# Patient Record
Sex: MALE | Race: WHITE | NOT HISPANIC OR LATINO | Employment: OTHER | URBAN - NONMETROPOLITAN AREA
[De-identification: names, ages, dates, MRNs, and addresses within clinical notes are randomized per-mention and may not be internally consistent; named-entity substitution may affect disease eponyms.]

---

## 2017-01-09 RX ORDER — AMLODIPINE BESYLATE 5 MG/1
TABLET ORAL
Qty: 90 TABLET | Refills: 0 | Status: SHIPPED | OUTPATIENT
Start: 2017-01-09 | End: 2017-04-06

## 2017-01-19 ENCOUNTER — OFFICE VISIT (OUTPATIENT)
Dept: INTERNAL MEDICINE | Facility: CLINIC | Age: 67
End: 2017-01-19

## 2017-01-19 VITALS
HEIGHT: 68 IN | SYSTOLIC BLOOD PRESSURE: 132 MMHG | BODY MASS INDEX: 20.76 KG/M2 | RESPIRATION RATE: 14 BRPM | HEART RATE: 68 BPM | WEIGHT: 137 LBS | TEMPERATURE: 98.4 F | DIASTOLIC BLOOD PRESSURE: 80 MMHG | OXYGEN SATURATION: 94 %

## 2017-01-19 DIAGNOSIS — E11.8 TYPE 2 DIABETES MELLITUS WITH COMPLICATION, WITHOUT LONG-TERM CURRENT USE OF INSULIN (HCC): ICD-10-CM

## 2017-01-19 DIAGNOSIS — I10 ESSENTIAL HYPERTENSION: ICD-10-CM

## 2017-01-19 DIAGNOSIS — I25.10 ATHEROSCLEROSIS OF NATIVE CORONARY ARTERY OF NATIVE HEART WITHOUT ANGINA PECTORIS: Primary | ICD-10-CM

## 2017-01-19 DIAGNOSIS — J44.9 STEROID-DEPENDENT COPD (HCC): ICD-10-CM

## 2017-01-19 DIAGNOSIS — Z23 NEED FOR VACCINATION: ICD-10-CM

## 2017-01-19 DIAGNOSIS — Z00.00 HEALTH CARE MAINTENANCE: ICD-10-CM

## 2017-01-19 DIAGNOSIS — N18.30 CHRONIC KIDNEY DISEASE, STAGE III (MODERATE) (HCC): ICD-10-CM

## 2017-01-19 DIAGNOSIS — G47.30 SLEEP APNEA, UNSPECIFIED TYPE: ICD-10-CM

## 2017-01-19 DIAGNOSIS — J42 CHRONIC BRONCHITIS, UNSPECIFIED CHRONIC BRONCHITIS TYPE (HCC): ICD-10-CM

## 2017-01-19 DIAGNOSIS — F41.9 ANXIETY: ICD-10-CM

## 2017-01-19 DIAGNOSIS — E78.5 HYPERLIPIDEMIA, UNSPECIFIED HYPERLIPIDEMIA TYPE: ICD-10-CM

## 2017-01-19 DIAGNOSIS — I48.91 ATRIAL FIBRILLATION, UNSPECIFIED TYPE (HCC): ICD-10-CM

## 2017-01-19 DIAGNOSIS — K21.9 GERD WITHOUT ESOPHAGITIS: ICD-10-CM

## 2017-01-19 DIAGNOSIS — N40.0 ENLARGED PROSTATE WITHOUT LOWER URINARY TRACT SYMPTOMS (LUTS): ICD-10-CM

## 2017-01-19 PROCEDURE — 99213 OFFICE O/P EST LOW 20 MIN: CPT | Performed by: INTERNAL MEDICINE

## 2017-01-19 PROCEDURE — 99397 PER PM REEVAL EST PAT 65+ YR: CPT | Performed by: INTERNAL MEDICINE

## 2017-01-19 PROCEDURE — G0009 ADMIN PNEUMOCOCCAL VACCINE: HCPCS | Performed by: INTERNAL MEDICINE

## 2017-01-19 PROCEDURE — 90670 PCV13 VACCINE IM: CPT | Performed by: INTERNAL MEDICINE

## 2017-01-19 RX ORDER — ESCITALOPRAM OXALATE 20 MG/1
TABLET ORAL
Qty: 30 TABLET | Refills: 3 | Status: SHIPPED | OUTPATIENT
Start: 2017-01-19 | End: 2017-02-20

## 2017-01-19 RX ORDER — PREDNISONE 10 MG/1
10 TABLET ORAL DAILY
Status: ON HOLD | COMMUNITY
Start: 2016-12-25 | End: 2017-09-18

## 2017-01-19 RX ORDER — BENZONATATE 200 MG/1
200 CAPSULE ORAL 3 TIMES DAILY PRN
Qty: 30 CAPSULE | Refills: 3 | Status: SHIPPED | OUTPATIENT
Start: 2017-01-19 | End: 2017-02-20

## 2017-01-19 RX ORDER — AZITHROMYCIN 500 MG/1
500 TABLET, FILM COATED ORAL DAILY
Qty: 5 TABLET | Refills: 0 | Status: SHIPPED | OUTPATIENT
Start: 2017-01-19 | End: 2017-02-20

## 2017-01-19 RX ORDER — FLUTICASONE PROPIONATE 50 MCG
1 SPRAY, SUSPENSION (ML) NASAL DAILY
COMMUNITY
Start: 2017-01-04 | End: 2017-12-22 | Stop reason: SDUPTHER

## 2017-01-19 RX ORDER — ATORVASTATIN CALCIUM 40 MG/1
TABLET, FILM COATED ORAL
COMMUNITY
Start: 2017-01-01 | End: 2017-02-22 | Stop reason: SDUPTHER

## 2017-01-19 NOTE — PROGRESS NOTES
Subjective   Thurman Mendel Parsons is a 66 y.o. male and is here for a comprehensive physical exam. Patient here for annual physical. Patient also has multiple medical problems to be followed up. Below is a level III office visit note. Depression still on medicine. No SI    COPD stable on medication needs a refill. Diabetes is stable on medication. Hypertension stable on medication. Chronic kidney disease is stable. Patient does complain urinary incontinence oxybutynin helps but still leaking urine a lot , cough again sputum,yellow, no f/c, no resp distress    Do you take any herbs or supplements that were not prescribed by a doctor? no  Are you taking calcium supplements? no  Are you taking aspirin daily? no      The following portions of the patient's history were reviewed and updated as appropriate: allergies, current medications, past family history, past medical history, past social history, past surgical history and problem list.      Review of Systems   Constitutional: Negative.    HENT: Negative.    Eyes: Negative.    Respiratory: Positive for cough, shortness of breath and wheezing.    Cardiovascular: Negative.    Gastrointestinal: Negative.    Endocrine: Negative.    Genitourinary: Negative.    Musculoskeletal: Negative.    Skin: Negative.    Allergic/Immunologic: Negative.    Neurological: Negative.    Hematological: Negative.    Psychiatric/Behavioral: The patient is nervous/anxious.         Depress   All other systems reviewed and are negative.        Physical Exam   Constitutional: He is oriented to person, place, and time. He appears well-developed and well-nourished.   HENT:   Head: Normocephalic and atraumatic.   Right Ear: External ear normal.   Left Ear: External ear normal.   Nose: Nose normal.   Mouth/Throat: Oropharynx is clear and moist.   Eyes: Conjunctivae and EOM are normal. Pupils are equal, round, and reactive to light.   Neck: Normal range of motion. Neck supple. No thyromegaly present.    Cardiovascular: Normal rate, regular rhythm, normal heart sounds and intact distal pulses.    Pulmonary/Chest: Effort normal. He has wheezes.   Abdominal: Soft. Bowel sounds are normal.   Genitourinary: Rectum normal, prostate normal and penis normal.   Musculoskeletal: Normal range of motion.   Neurological: He is alert and oriented to person, place, and time. He has normal reflexes.   Skin: Skin is warm and dry.   Psychiatric: His behavior is normal. Judgment and thought content normal.   Depress anxious   Nursing note and vitals reviewed.      All  tests have been reviewed.    Assessment/Plan          1. Patient Counseling:  --Nutrition: Stressed importance of moderation in sodium/caffeine intake, saturated fat and cholesterol, caloric balance, sufficient intake of fresh fruits, vegetables, fiber, calcium and iron.  --Exercise: Stressed the importance of regular exercise.   --Injury prevention: Discussed safety belts, safety helmets, smoke detector, smoking near bedding or upholstery.   --Dental health: Discussed importance of regular tooth brushing, flossing, and dental visits.  --Immunizations reviewed.  --Discussed benefits of screening colonoscopy.  --After hours service discussed with patient    2. Discussed the patient's BMI with him.            COPD continue inhaler chronic prednisone 5 mg daily considering patient has recurrent bronchitis COPD exacerbation. refill medicine, follow lung doctor, cough and sputum again, start tesslon and zithromax  osteopenia, oscal D 500mg bid  Weight loss stable now  left low back pain follow up with ortho s/p left hip surgery for severe OA doing well.   alkaline phosphatase still high watch  divertic hemorroid on colon 1/2013  DM CONTINUE MEDICINE  phimosis seen by uro declined surgery  Hematuria followup with urologist s/p cystoscope bladder stones  Hypertension continue medicine  BPH enlarged on CT scan follow up with urologist.continue flomax   Anemia workup all  negative.normal now. It is anemia of chronic disease due to chronic kidney disease followup with nephrologist  CKD follow up with kidney doctor  History of the CAD atrial fibrillation VSD status post occlusion stable now. Status post a defibrillator seen by cardio follow up with cardio  History of a TIA patient is on Plavix continue  pneumovax done, ,zostavax to pharmacy,  prevnar 13 today, Td to HD.  Depression increase lexapro to 20  Decline to disrobe for PE   right inquinal hernia patient to call dr jc  1 mo after labs

## 2017-01-19 NOTE — MR AVS SNAPSHOT
Thurman Mendel Parsons   1/19/2017 3:15 PM   Office Visit    Provider:  Miguel Rojas MD   Department:  [facility] PRIMARY CARE   Dept Phone:  [phone]                Your Full Care Plan              Today's Medication Changes          These changes are accurate as of: 1/19/17  4:39 PM.  If you have any questions, ask your nurse or doctor.               Medication(s)that have changed:     atorvastatin 40 MG tablet   Commonly known as:  LIPITOR   What changed:  Another medication with the same name was removed. Continue taking this medication, and follow the directions you see here.   Changed by:  Miguel Rojas MD       benzonatate 200 MG capsule   Commonly known as:  TESSALON   Take 1 capsule by mouth 3 (Three) Times a Day As Needed (cough).   What changed:  reasons to take this   Changed by:  Miguel Rojas MD       escitalopram 20 MG tablet   Commonly known as:  LEXAPRO   1 tab daily po   What changed:  See the new instructions.   Changed by:  Miguel Rojas MD       predniSONE 10 MG tablet   Commonly known as:  DELTASONE   What changed:  Another medication with the same name was removed. Continue taking this medication, and follow the directions you see here.   Changed by:  Miguel Rojas MD         Stop taking medication(s)listed here:     ALBUTEROL IN   Stopped by:  Miguel Rojas MD                Where to Get Your Medications      [facility] [place] [facility] [place] [place] [address] [place] [facility] [phone] [facility] [phone] [address] [address]     Phone:  [phone]     azithromycin 500 MG tablet    benzonatate 200 MG capsule    escitalopram 20 MG tablet                  Your Updated Medication List          This list is accurate as of: 1/19/17  4:39 PM.  Always use your most recent med list.                * albuterol (2.5 MG/3ML) 0.083% nebulizer solution   Commonly known as:  PROVENTIL       *  PROAIR  (90 BASE) MCG/ACT inhaler   Generic drug:  albuterol       alfuzosin 10 MG 24 hr tablet   Commonly known as:  UROXATRAL   TAKE ONE TABLET BY MOUTH EVERY MORNING       ALPRAZolam 0.5 MG tablet   Commonly known as:  XANAX   TAKE ONE TABLET BY MOUTH EVERY NIGHT AT BEDTIME       amLODIPine 5 MG tablet   Commonly known as:  NORVASC   TAKE ONE TABLET BY MOUTH DAILY       amLODIPine-benazepril 5-40 MG per capsule   Commonly known as:  LOTREL       aspirin 81 MG tablet       atorvastatin 40 MG tablet   Commonly known as:  LIPITOR       azithromycin 500 MG tablet   Commonly known as:  ZITHROMAX   Take 1 tablet by mouth Daily.       benzonatate 200 MG capsule   Commonly known as:  TESSALON   Take 1 capsule by mouth 3 (Three) Times a Day As Needed (cough).       BREO ELLIPTA 200-25 MCG/INH aerosol powder    Generic drug:  Fluticasone Furoate-Vilanterol       carvedilol 6.25 MG tablet   Commonly known as:  COREG   TAKE ONE TABLET BY MOUTH TWICE A DAY WITH MEALS       cefuroxime 500 MG tablet   Commonly known as:  CEFTIN       clopidogrel 75 MG tablet   Commonly known as:  PLAVIX   TAKE ONE TABLET BY MOUTH DAILY       cyclobenzaprine 10 MG tablet   Commonly known as:  FLEXERIL       escitalopram 20 MG tablet   Commonly known as:  LEXAPRO   1 tab daily po       famotidine 20 MG tablet   Commonly known as:  PEPCID       fluticasone 50 MCG/ACT nasal spray   Commonly known as:  FLONASE       losartan 100 MG tablet   Commonly known as:  COZAAR   Take 1 tablet by mouth daily.       * nitroglycerin 0.4 MG SL tablet   Commonly known as:  NITROSTAT       * NITROSTAT 0.4 MG SL tablet   Generic drug:  nitroglycerin       oxybutynin 5 MG tablet   Commonly known as:  DITROPAN   TAKE ONE TABLET BY MOUTH AT BEDTIME       POLY-IRON 150 150 MG capsule   Generic drug:  iron polysaccharides       predniSONE 10 MG tablet   Commonly known as:  DELTASONE       tamsulosin 0.4 MG capsule 24 hr capsule   Commonly known as:  FLOMAX   TAKE  ONE CAPSULE BY MOUTH DAILY       tiotropium 18 MCG per inhalation capsule   Commonly known as:  SPIRIVA       * Notice:  This list has 4 medication(s) that are the same as other medications prescribed for you. Read the directions carefully, and ask your doctor or other care provider to review them with you.            You Were Diagnosed With        Codes Comments    Atherosclerosis of native coronary artery of native heart without angina pectoris    -  Primary ICD-10-CM: I25.10  ICD-9-CM: 414.01     Atrial fibrillation, unspecified type     ICD-10-CM: I48.91  ICD-9-CM: 427.31     Hyperlipidemia, unspecified hyperlipidemia type     ICD-10-CM: E78.5  ICD-9-CM: 272.4     Essential hypertension     ICD-10-CM: I10  ICD-9-CM: 401.9     Chronic bronchitis, unspecified chronic bronchitis type     ICD-10-CM: J42  ICD-9-CM: 491.9     Steroid-dependent COPD     ICD-10-CM: J44.9  ICD-9-CM: 496, V58.65     GERD without esophagitis     ICD-10-CM: K21.9  ICD-9-CM: 530.81     Type 2 diabetes mellitus with complication, without long-term current use of insulin     ICD-10-CM: E11.8  ICD-9-CM: 250.90     Chronic kidney disease, stage III (moderate)     ICD-10-CM: N18.3  ICD-9-CM: 585.3     Enlarged prostate without lower urinary tract symptoms (luts)     ICD-10-CM: N40.0  ICD-9-CM: 600.00     Anxiety     ICD-10-CM: F41.9  ICD-9-CM: 300.00     Sleep apnea, unspecified type     ICD-10-CM: G47.30  ICD-9-CM: 780.57     Health care maintenance     ICD-10-CM: Z00.00  ICD-9-CM: V70.0       Medications to be Given to You by a Medical Professional     Due       Frequency    1/19/2017 pneumococcal conj. 13-valent (PREVNAR-13) vaccine 0.5 mL  Once      Instructions     None    Patient Instructions History      MyChart Signup     Our records indicate that you have an active [employer] account.    You can view your After Visit Summary by going to [facility] and logging in with your [employer] username and password.  If you  "don't have a [email or web address] username and password but a parent or guardian has access to your record, the parent or guardian should login with their own [email or web address] username and password and access your record to view the After Visit Summary.    If you have questions, you can email [email or web address] or call [phone] to talk to our [email or web address] staff.  Remember, [email or web address] is NOT to be used for urgent needs.  For medical emergencies, dial 911.               Other Info from Your Visit           Your Appointments     Feb 07, 2017 11:30 AM EST   CT kalee chest wo contrast with KALEE JUNIOR CT 1   [facility] CT AT [place] ([place] ([facility]    [address] [place] [id number] [phone]           n/a            Jun 08, 2017 11:00 AM EDT   Follow Up with Ulysses Bass MD   [facility] MEDICAL [facility] CARDIOLOGY (--)    [address] [address] [phone]           Arrive 15 minutes prior to appointment.              Allergies     Sulfa Antibiotics  Other (See Comments), Nausea Only, Nausea And Vomiting      Reason for Visit     Annual Exam wellness physical      Vital Signs     Blood Pressure Pulse Temperature Respirations Height Weight    132/80 68 98.4 °F (36.9 °C) 14 68\" (172.7 cm) 137 lb (62.1 kg)    Oxygen Saturation Body Mass Index Smoking Status             94% 20.83 kg/m2 Former Smoker         Problems and Diagnoses Noted     Anxiety problem    Atherosclerotic heart disease of native coronary artery without angina pectoris    Atrial fibrillation (irregular heartbeat)    Chronic bronchitis    Chronic kidney disease, stage III (moderate)    Diabetes    Enlarged prostate    GERD without esophagitis    High cholesterol or triglycerides    High blood pressure    Sleep apnea    Steroid-dependent COPD    Health maintenance examination          No Longer an Issue     Anemia    Shortness of breath    Esophagitis, reflux    Respiratory failure      "

## 2017-01-29 DIAGNOSIS — N40.0 BENIGN NON-NODULAR PROSTATIC HYPERPLASIA WITHOUT LOWER URINARY TRACT SYMPTOMS: ICD-10-CM

## 2017-01-30 RX ORDER — ALFUZOSIN HYDROCHLORIDE 10 MG/1
TABLET, EXTENDED RELEASE ORAL
Qty: 30 TABLET | Refills: 4 | Status: SHIPPED | OUTPATIENT
Start: 2017-01-30 | End: 2017-04-13 | Stop reason: SDUPTHER

## 2017-02-01 RX ORDER — FAMOTIDINE 20 MG/1
20 TABLET, FILM COATED ORAL DAILY
Qty: 30 TABLET | Refills: 0 | Status: SHIPPED | OUTPATIENT
Start: 2017-02-01 | End: 2017-03-04 | Stop reason: SDUPTHER

## 2017-02-07 ENCOUNTER — HOSPITAL ENCOUNTER (OUTPATIENT)
Dept: CT IMAGING | Facility: HOSPITAL | Age: 67
Discharge: HOME | End: 2017-02-07
Admitting: INTERNAL MEDICINE

## 2017-02-07 DIAGNOSIS — R91.1 PULMONARY NODULE: ICD-10-CM

## 2017-02-07 PROCEDURE — 71250 CT THORAX DX C-: CPT

## 2017-02-20 ENCOUNTER — OFFICE VISIT (OUTPATIENT)
Dept: INTERNAL MEDICINE | Facility: CLINIC | Age: 67
End: 2017-02-20

## 2017-02-20 VITALS
HEIGHT: 68 IN | TEMPERATURE: 99.2 F | RESPIRATION RATE: 14 BRPM | BODY MASS INDEX: 20.92 KG/M2 | OXYGEN SATURATION: 90 % | SYSTOLIC BLOOD PRESSURE: 140 MMHG | WEIGHT: 138 LBS | DIASTOLIC BLOOD PRESSURE: 66 MMHG | HEART RATE: 72 BPM

## 2017-02-20 DIAGNOSIS — I63.9 CEREBROVASCULAR ACCIDENT (CVA), UNSPECIFIED MECHANISM (HCC): ICD-10-CM

## 2017-02-20 DIAGNOSIS — J42 CHRONIC BRONCHITIS, UNSPECIFIED CHRONIC BRONCHITIS TYPE (HCC): ICD-10-CM

## 2017-02-20 DIAGNOSIS — E11.8 TYPE 2 DIABETES MELLITUS WITH COMPLICATION, WITHOUT LONG-TERM CURRENT USE OF INSULIN (HCC): ICD-10-CM

## 2017-02-20 DIAGNOSIS — F32.A DEPRESSION, UNSPECIFIED DEPRESSION TYPE: ICD-10-CM

## 2017-02-20 DIAGNOSIS — N40.0 ENLARGED PROSTATE WITHOUT LOWER URINARY TRACT SYMPTOMS (LUTS): ICD-10-CM

## 2017-02-20 DIAGNOSIS — I25.10 ATHEROSCLEROSIS OF NATIVE CORONARY ARTERY OF NATIVE HEART WITHOUT ANGINA PECTORIS: Primary | ICD-10-CM

## 2017-02-20 DIAGNOSIS — E78.5 HYPERLIPIDEMIA, UNSPECIFIED HYPERLIPIDEMIA TYPE: ICD-10-CM

## 2017-02-20 DIAGNOSIS — I48.91 ATRIAL FIBRILLATION, UNSPECIFIED TYPE (HCC): ICD-10-CM

## 2017-02-20 DIAGNOSIS — J44.9 STEROID-DEPENDENT COPD (HCC): ICD-10-CM

## 2017-02-20 DIAGNOSIS — K21.9 GERD WITHOUT ESOPHAGITIS: ICD-10-CM

## 2017-02-20 DIAGNOSIS — Z87.39 HISTORY OF ARTHRITIS: ICD-10-CM

## 2017-02-20 DIAGNOSIS — I10 ESSENTIAL HYPERTENSION: ICD-10-CM

## 2017-02-20 DIAGNOSIS — N18.30 CHRONIC KIDNEY DISEASE, STAGE III (MODERATE) (HCC): ICD-10-CM

## 2017-02-20 PROCEDURE — 99214 OFFICE O/P EST MOD 30 MIN: CPT | Performed by: INTERNAL MEDICINE

## 2017-02-20 RX ORDER — ESCITALOPRAM OXALATE 10 MG/1
TABLET ORAL
COMMUNITY
Start: 2017-01-29 | End: 2017-02-22 | Stop reason: SDUPTHER

## 2017-02-20 NOTE — PROGRESS NOTES
Subjective   Thurman Mendel Parsons is a 66 y.o. male.     Chief Complaint   Patient presents with   • Hypertension     Here for follow up    • Diarrhea     after eating - ongoing for about two weeks       History of Present Illness   COPD stable medication.  Osteopenia stable medication.  Weight loss stopped already.  Diabetes is stable medication.  BPH is stable on medication.  CK D patient is seeing chronic kidney doctor.  The depression improved after medication increased.  Patient complains diarrhea denies any abdominal pain no nausea no vomiting.  No constipation.  No significant difference seeing different foods.    Current Outpatient Prescriptions:   •  albuterol (PROVENTIL) (2.5 MG/3ML) 0.083% nebulizer solution, Albuterol Sulfate (2.5 MG/3ML) 0.083% Inhalation Nebulization Solution; Patient Sig: Albuterol Sulfate (2.5 MG/3ML) 0.083% Inhalation Nebulization Solution USE 1 UNIT DOSE EVERY 4-6 HOURS AS NEEDED FOR WHEEZING .; 3; 5; 04-Sep-2012; Active, Disp: , Rfl:   •  alfuzosin (UROXATRAL) 10 MG 24 hr tablet, TAKE ONE TABLET BY MOUTH EVERY MORNING, Disp: 30 tablet, Rfl: 4  •  ALPRAZolam (XANAX) 0.5 MG tablet, TAKE ONE TABLET BY MOUTH EVERY NIGHT AT BEDTIME, Disp: 30 tablet, Rfl: 1  •  amLODIPine (NORVASC) 5 MG tablet, TAKE ONE TABLET BY MOUTH DAILY, Disp: 90 tablet, Rfl: 0  •  amLODIPine-benazepril (LOTREL) 5-40 MG per capsule, Take 1 capsule by mouth daily., Disp: , Rfl:   •  aspirin 81 MG tablet, Take 81 mg by mouth daily., Disp: , Rfl:   •  atorvastatin (LIPITOR) 40 MG tablet, , Disp: , Rfl:   •  BREO ELLIPTA 200-25 MCG/INH aerosol powder , , Disp: , Rfl: 10  •  carvedilol (COREG) 6.25 MG tablet, TAKE ONE TABLET BY MOUTH TWICE A DAY WITH MEALS, Disp: 180 tablet, Rfl: 2  •  cefuroxime (CEFTIN) 500 MG tablet, , Disp: , Rfl:   •  clopidogrel (PLAVIX) 75 MG tablet, TAKE ONE TABLET BY MOUTH DAILY, Disp: 90 tablet, Rfl: 2  •  cyclobenzaprine (FLEXERIL) 10 MG tablet, Take  by mouth., Disp: , Rfl:   •   escitalopram (LEXAPRO) 10 MG tablet, , Disp: , Rfl:   •  famotidine (PEPCID) 20 MG tablet, Take 1 tablet by mouth Daily., Disp: 30 tablet, Rfl: 0  •  fluticasone (FLONASE) 50 MCG/ACT nasal spray, , Disp: , Rfl:   •  iron polysaccharides (POLY-IRON 150) 150 MG capsule, Take  by mouth daily., Disp: , Rfl:   •  losartan (COZAAR) 100 MG tablet, Take 1 tablet by mouth daily., Disp: 90 tablet, Rfl: 3  •  nitroglycerin (NITROSTAT) 0.4 MG SL tablet, Place  under the tongue., Disp: , Rfl:   •  oxybutynin (DITROPAN) 5 MG tablet, TAKE ONE TABLET BY MOUTH AT BEDTIME, Disp: 30 tablet, Rfl: 5  •  predniSONE (DELTASONE) 10 MG tablet, , Disp: , Rfl:   •  PROAIR  (90 BASE) MCG/ACT inhaler, , Disp: , Rfl:   •  tamsulosin (FLOMAX) 0.4 MG capsule 24 hr capsule, TAKE ONE CAPSULE BY MOUTH DAILY, Disp: 30 capsule, Rfl: 3  •  tiotropium (SPIRIVA) 18 MCG per inhalation capsule, Place 1 capsule into inhaler and inhale 1 (one) time daily., Disp: , Rfl:     The following portions of the patient's history were reviewed and updated as appropriate: allergies, current medications, past family history, past medical history, past social history, past surgical history and problem list.    Review of Systems   Constitutional: Negative.    Respiratory: Positive for cough and shortness of breath.    Cardiovascular: Negative.    Gastrointestinal: Negative.    Musculoskeletal: Negative.    Skin: Negative.    Neurological: Negative.    Psychiatric/Behavioral: Negative.        Objective   Physical Exam   Constitutional: He is oriented to person, place, and time. He appears well-nourished.   Neck: Neck supple.   Cardiovascular: Normal rate, regular rhythm and normal heart sounds.    Pulmonary/Chest: Effort normal. He has wheezes.   Abdominal: Bowel sounds are normal.   Neurological: He is alert and oriented to person, place, and time.   Skin: Skin is warm.   Psychiatric: He has a normal mood and affect.       All tests have been  reviewed.    Assessment/Plan   There are no diagnoses linked to this encounter.          COPD continue inhaler chronic prednisone 5 mg daily considering patient has recurrent bronchitis COPD exacerbation. Continue meds  osteopenia, oscal D 500mg bid we will discuss bone density scan next---  Weight loss stable now  left low back pain follow up with ortho s/p left hip surgery for severe OA doing well.   alkaline phosphatase still high watch---  divertic hemorroid on colon 1/2013  DM CONTINUE MEDICINE  phimosis seen by uro declined surgery  Hematuria followup with urologist s/p cystoscope bladder stones  Hypertension continue medicine  BPH enlarged on CT scan follow up with urologist.continue flomax   Anemia workup all negative.normal now. It is anemia of chronic disease due to chronic kidney disease followup with nephrologist  CKD follow up with kidney doctor  History of the CAD atrial fibrillation VSD status post occlusion stable now. Status post a defibrillator seen by cardio follow up with cardio  History of a TIA patient is on Plavix continue  pneumovax done, ,zostavax to pharmacy,  prevnar 13 1/19/17, Td to HD.  Depression cont lexapro  20  Decline to disrobe for PE  right inquinal hernia patient to call dr jc  Diarrhea avoid dairy product and probiotic and fiber     3 week after labs

## 2017-02-21 ENCOUNTER — LAB (OUTPATIENT)
Dept: INTERNAL MEDICINE | Facility: CLINIC | Age: 67
End: 2017-02-21

## 2017-02-21 DIAGNOSIS — E78.5 HYPERLIPIDEMIA, UNSPECIFIED HYPERLIPIDEMIA TYPE: ICD-10-CM

## 2017-02-21 DIAGNOSIS — E11.8 TYPE 2 DIABETES MELLITUS WITH COMPLICATION, WITHOUT LONG-TERM CURRENT USE OF INSULIN (HCC): ICD-10-CM

## 2017-02-21 DIAGNOSIS — K21.9 GERD WITHOUT ESOPHAGITIS: ICD-10-CM

## 2017-02-21 DIAGNOSIS — N40.0 ENLARGED PROSTATE WITHOUT LOWER URINARY TRACT SYMPTOMS (LUTS): ICD-10-CM

## 2017-02-21 DIAGNOSIS — I48.91 ATRIAL FIBRILLATION, UNSPECIFIED TYPE (HCC): ICD-10-CM

## 2017-02-21 DIAGNOSIS — N18.30 CHRONIC KIDNEY DISEASE, STAGE III (MODERATE) (HCC): ICD-10-CM

## 2017-02-21 LAB
ALBUMIN SERPL-MCNC: 4.1 G/DL (ref 3.2–4.8)
ALBUMIN/GLOB SERPL: 2.1 G/DL (ref 1.5–2.5)
ALP SERPL-CCNC: 65 U/L (ref 25–100)
ALT SERPL W P-5'-P-CCNC: 17 U/L (ref 7–40)
ANION GAP SERPL CALCULATED.3IONS-SCNC: 2 MMOL/L (ref 3–11)
ARTICHOKE IGE QN: 69 MG/DL (ref 0–130)
AST SERPL-CCNC: 27 U/L (ref 0–33)
BACTERIA UR QL AUTO: ABNORMAL /HPF
BASOPHILS # BLD AUTO: 0.02 10*3/MM3 (ref 0–0.2)
BASOPHILS NFR BLD AUTO: 0.2 % (ref 0–1)
BILIRUB SERPL-MCNC: 1.2 MG/DL (ref 0.3–1.2)
BILIRUB UR QL STRIP: NEGATIVE
BUN BLD-MCNC: 23 MG/DL (ref 9–23)
BUN/CREAT SERPL: 23 (ref 7–25)
CALCIUM SPEC-SCNC: 10.1 MG/DL (ref 8.7–10.4)
CHLORIDE SERPL-SCNC: 104 MMOL/L (ref 99–109)
CHOLEST SERPL-MCNC: 143 MG/DL (ref 0–200)
CK SERPL-CCNC: 62 U/L (ref 26–174)
CLARITY UR: ABNORMAL
CO2 SERPL-SCNC: 39 MMOL/L (ref 20–31)
COLOR UR: YELLOW
CREAT BLD-MCNC: 1 MG/DL (ref 0.6–1.3)
DEPRECATED RDW RBC AUTO: 42.3 FL (ref 37–54)
EOSINOPHIL # BLD AUTO: 0.01 10*3/MM3 (ref 0.1–0.3)
EOSINOPHIL NFR BLD AUTO: 0.1 % (ref 0–3)
ERYTHROCYTE [DISTWIDTH] IN BLOOD BY AUTOMATED COUNT: 12 % (ref 11.3–14.5)
GFR SERPL CREATININE-BSD FRML MDRD: 75 ML/MIN/1.73
GLOBULIN UR ELPH-MCNC: 2 GM/DL
GLUCOSE BLD-MCNC: 88 MG/DL (ref 70–100)
GLUCOSE UR STRIP-MCNC: NEGATIVE MG/DL
HBA1C MFR BLD: 5.5 % (ref 4.8–5.6)
HCT VFR BLD AUTO: 42.3 % (ref 38.9–50.9)
HDLC SERPL-MCNC: 52 MG/DL (ref 40–60)
HGB BLD-MCNC: 13.4 G/DL (ref 13.1–17.5)
HGB UR QL STRIP.AUTO: NEGATIVE
HYALINE CASTS UR QL AUTO: ABNORMAL /LPF
IMM GRANULOCYTES # BLD: 0.02 10*3/MM3 (ref 0–0.03)
IMM GRANULOCYTES NFR BLD: 0.2 % (ref 0–0.6)
KETONES UR QL STRIP: NEGATIVE
LEUKOCYTE ESTERASE UR QL STRIP.AUTO: ABNORMAL
LYMPHOCYTES # BLD AUTO: 0.73 10*3/MM3 (ref 0.6–4.8)
LYMPHOCYTES NFR BLD AUTO: 7.5 % (ref 24–44)
MCH RBC QN AUTO: 30.7 PG (ref 27–31)
MCHC RBC AUTO-ENTMCNC: 31.7 G/DL (ref 32–36)
MCV RBC AUTO: 96.8 FL (ref 80–99)
MONOCYTES # BLD AUTO: 0.6 10*3/MM3 (ref 0–1)
MONOCYTES NFR BLD AUTO: 6.1 % (ref 0–12)
NEUTROPHILS # BLD AUTO: 8.4 10*3/MM3 (ref 1.5–8.3)
NEUTROPHILS NFR BLD AUTO: 85.9 % (ref 41–71)
NITRITE UR QL STRIP: NEGATIVE
PH UR STRIP.AUTO: 7.5 [PH] (ref 5–8)
PLATELET # BLD AUTO: 190 10*3/MM3 (ref 150–450)
PMV BLD AUTO: 9.6 FL (ref 6–12)
POTASSIUM BLD-SCNC: 4.1 MMOL/L (ref 3.5–5.5)
PROT SERPL-MCNC: 6.1 G/DL (ref 5.7–8.2)
PROT UR QL STRIP: NEGATIVE
PSA SERPL-MCNC: 0.8 NG/ML (ref 0–4)
RBC # BLD AUTO: 4.37 10*6/MM3 (ref 4.2–5.76)
RBC # UR: ABNORMAL /HPF
REF LAB TEST METHOD: ABNORMAL
SODIUM BLD-SCNC: 145 MMOL/L (ref 132–146)
SP GR UR STRIP: 1.02 (ref 1–1.03)
SQUAMOUS #/AREA URNS HPF: ABNORMAL /HPF
TRIGL SERPL-MCNC: 76 MG/DL (ref 0–150)
TSH SERPL DL<=0.05 MIU/L-ACNC: 0.72 MIU/ML (ref 0.35–5.35)
UROBILINOGEN UR QL STRIP: ABNORMAL
WBC NRBC COR # BLD: 9.78 10*3/MM3 (ref 3.5–10.8)
WBC UR QL AUTO: ABNORMAL /HPF

## 2017-02-21 PROCEDURE — 84443 ASSAY THYROID STIM HORMONE: CPT | Performed by: INTERNAL MEDICINE

## 2017-02-21 PROCEDURE — 80061 LIPID PANEL: CPT | Performed by: INTERNAL MEDICINE

## 2017-02-21 PROCEDURE — 80053 COMPREHEN METABOLIC PANEL: CPT | Performed by: INTERNAL MEDICINE

## 2017-02-21 PROCEDURE — 84153 ASSAY OF PSA TOTAL: CPT | Performed by: INTERNAL MEDICINE

## 2017-02-21 PROCEDURE — 36415 COLL VENOUS BLD VENIPUNCTURE: CPT | Performed by: INTERNAL MEDICINE

## 2017-02-21 PROCEDURE — 85025 COMPLETE CBC W/AUTO DIFF WBC: CPT | Performed by: INTERNAL MEDICINE

## 2017-02-21 PROCEDURE — 83036 HEMOGLOBIN GLYCOSYLATED A1C: CPT | Performed by: INTERNAL MEDICINE

## 2017-02-21 PROCEDURE — 81001 URINALYSIS AUTO W/SCOPE: CPT | Performed by: INTERNAL MEDICINE

## 2017-02-21 PROCEDURE — 82550 ASSAY OF CK (CPK): CPT | Performed by: INTERNAL MEDICINE

## 2017-02-22 RX ORDER — CARVEDILOL 6.25 MG/1
6.25 TABLET ORAL 2 TIMES DAILY WITH MEALS
Qty: 180 TABLET | Refills: 1 | Status: SHIPPED | OUTPATIENT
Start: 2017-02-22 | End: 2017-04-06 | Stop reason: SDUPTHER

## 2017-02-22 RX ORDER — CLOPIDOGREL BISULFATE 75 MG/1
75 TABLET ORAL DAILY
Qty: 90 TABLET | Refills: 1 | Status: SHIPPED | OUTPATIENT
Start: 2017-02-22 | End: 2018-03-06 | Stop reason: SDUPTHER

## 2017-02-22 RX ORDER — ATORVASTATIN CALCIUM 40 MG/1
40 TABLET, FILM COATED ORAL DAILY
Qty: 90 TABLET | Refills: 1 | Status: SHIPPED | OUTPATIENT
Start: 2017-02-22 | End: 2017-04-06

## 2017-02-22 RX ORDER — ESCITALOPRAM OXALATE 10 MG/1
10 TABLET ORAL DAILY
Qty: 90 TABLET | Refills: 1 | Status: SHIPPED | OUTPATIENT
Start: 2017-02-22 | End: 2018-05-04 | Stop reason: SDUPTHER

## 2017-03-06 RX ORDER — FAMOTIDINE 20 MG/1
TABLET, FILM COATED ORAL
Qty: 30 TABLET | Refills: 0 | Status: SHIPPED | OUTPATIENT
Start: 2017-03-06 | End: 2017-06-08

## 2017-03-06 RX ORDER — ALPRAZOLAM 0.5 MG/1
0.5 TABLET ORAL NIGHTLY PRN
Qty: 30 TABLET | Refills: 3 | Status: SHIPPED | OUTPATIENT
Start: 2017-03-06 | End: 2017-06-08

## 2017-03-06 RX ORDER — ALPRAZOLAM 0.5 MG/1
TABLET ORAL
Qty: 30 TABLET | Refills: 2 | Status: SHIPPED | OUTPATIENT
Start: 2017-03-06 | End: 2017-03-13

## 2017-03-13 ENCOUNTER — OFFICE VISIT (OUTPATIENT)
Dept: INTERNAL MEDICINE | Facility: CLINIC | Age: 67
End: 2017-03-13

## 2017-03-13 VITALS
HEART RATE: 72 BPM | TEMPERATURE: 98.1 F | WEIGHT: 138 LBS | OXYGEN SATURATION: 90 % | DIASTOLIC BLOOD PRESSURE: 80 MMHG | SYSTOLIC BLOOD PRESSURE: 164 MMHG | HEIGHT: 68 IN | BODY MASS INDEX: 20.92 KG/M2 | RESPIRATION RATE: 14 BRPM

## 2017-03-13 DIAGNOSIS — I48.91 ATRIAL FIBRILLATION, UNSPECIFIED TYPE (HCC): ICD-10-CM

## 2017-03-13 DIAGNOSIS — J44.9 STEROID-DEPENDENT COPD (HCC): ICD-10-CM

## 2017-03-13 DIAGNOSIS — I25.10 ATHEROSCLEROSIS OF NATIVE CORONARY ARTERY OF NATIVE HEART WITHOUT ANGINA PECTORIS: ICD-10-CM

## 2017-03-13 DIAGNOSIS — K21.9 GERD WITHOUT ESOPHAGITIS: ICD-10-CM

## 2017-03-13 DIAGNOSIS — E11.8 TYPE 2 DIABETES MELLITUS WITH COMPLICATION, WITHOUT LONG-TERM CURRENT USE OF INSULIN (HCC): ICD-10-CM

## 2017-03-13 DIAGNOSIS — N40.0 ENLARGED PROSTATE WITHOUT LOWER URINARY TRACT SYMPTOMS (LUTS): ICD-10-CM

## 2017-03-13 DIAGNOSIS — J42 CHRONIC BRONCHITIS, UNSPECIFIED CHRONIC BRONCHITIS TYPE (HCC): ICD-10-CM

## 2017-03-13 DIAGNOSIS — I63.9 CEREBROVASCULAR ACCIDENT (CVA), UNSPECIFIED MECHANISM (HCC): ICD-10-CM

## 2017-03-13 DIAGNOSIS — E78.5 HYPERLIPIDEMIA, UNSPECIFIED HYPERLIPIDEMIA TYPE: ICD-10-CM

## 2017-03-13 DIAGNOSIS — N18.30 CHRONIC KIDNEY DISEASE, STAGE III (MODERATE) (HCC): ICD-10-CM

## 2017-03-13 DIAGNOSIS — I10 ESSENTIAL HYPERTENSION: ICD-10-CM

## 2017-03-13 DIAGNOSIS — Z79.52 CURRENT USE OF STEROID MEDICATION: Primary | ICD-10-CM

## 2017-03-13 PROCEDURE — 99214 OFFICE O/P EST MOD 30 MIN: CPT | Performed by: INTERNAL MEDICINE

## 2017-03-13 NOTE — PROGRESS NOTES
Subjective   Thurman Mendel Parsons is a 66 y.o. male.     Chief Complaint   Patient presents with   • Labs Only     Here for follow up on labs       History of Present Illness   Patient here for follow-up.  COPD stable on medication.  Osteopenia chronica steroid use patient needed a bone density scan.  Diabetes is stable medication.  Hypertension blood pressure elevated today.  Patient is taking blood pressure medication.  Chronica kidney disease is stable.  The depression stable medication.  Inguinal hernia patient declined to see surgeon now.  Diarrhea improved after management.    Current Outpatient Prescriptions:   •  albuterol (PROVENTIL) (2.5 MG/3ML) 0.083% nebulizer solution, Albuterol Sulfate (2.5 MG/3ML) 0.083% Inhalation Nebulization Solution; Patient Sig: Albuterol Sulfate (2.5 MG/3ML) 0.083% Inhalation Nebulization Solution USE 1 UNIT DOSE EVERY 4-6 HOURS AS NEEDED FOR WHEEZING .; 3; 5; 04-Sep-2012; Active, Disp: , Rfl:   •  alfuzosin (UROXATRAL) 10 MG 24 hr tablet, TAKE ONE TABLET BY MOUTH EVERY MORNING, Disp: 30 tablet, Rfl: 4  •  ALPRAZolam (XANAX) 0.5 MG tablet, Take 1 tablet by mouth At Night As Needed for anxiety., Disp: 30 tablet, Rfl: 3  •  amLODIPine (NORVASC) 5 MG tablet, TAKE ONE TABLET BY MOUTH DAILY, Disp: 90 tablet, Rfl: 0  •  amLODIPine-benazepril (LOTREL) 5-40 MG per capsule, Take 1 capsule by mouth daily., Disp: , Rfl:   •  aspirin 81 MG tablet, Take 81 mg by mouth daily., Disp: , Rfl:   •  atorvastatin (LIPITOR) 40 MG tablet, Take 1 tablet by mouth Daily., Disp: 90 tablet, Rfl: 1  •  BREO ELLIPTA 200-25 MCG/INH aerosol powder , , Disp: , Rfl: 10  •  carvedilol (COREG) 6.25 MG tablet, Take 1 tablet by mouth 2 (Two) Times a Day With Meals., Disp: 180 tablet, Rfl: 1  •  cefuroxime (CEFTIN) 500 MG tablet, , Disp: , Rfl:   •  clopidogrel (PLAVIX) 75 MG tablet, Take 1 tablet by mouth Daily., Disp: 90 tablet, Rfl: 1  •  cyclobenzaprine (FLEXERIL) 10 MG tablet, Take  by mouth., Disp: , Rfl:    •  escitalopram (LEXAPRO) 10 MG tablet, Take 1 tablet by mouth Daily., Disp: 90 tablet, Rfl: 1  •  famotidine (PEPCID) 20 MG tablet, TAKE ONE TABLET BY MOUTH DAILY ** NEED TO CALL PRIMARY DOCTOR FOR REFILLS, Disp: 30 tablet, Rfl: 0  •  fluticasone (FLONASE) 50 MCG/ACT nasal spray, , Disp: , Rfl:   •  iron polysaccharides (POLY-IRON 150) 150 MG capsule, Take  by mouth daily., Disp: , Rfl:   •  losartan (COZAAR) 100 MG tablet, Take 1 tablet by mouth daily., Disp: 90 tablet, Rfl: 3  •  nitroglycerin (NITROSTAT) 0.4 MG SL tablet, Place  under the tongue., Disp: , Rfl:   •  oxybutynin (DITROPAN) 5 MG tablet, TAKE ONE TABLET BY MOUTH AT BEDTIME, Disp: 30 tablet, Rfl: 5  •  predniSONE (DELTASONE) 10 MG tablet, , Disp: , Rfl:   •  PROAIR  (90 BASE) MCG/ACT inhaler, , Disp: , Rfl:   •  tamsulosin (FLOMAX) 0.4 MG capsule 24 hr capsule, TAKE ONE CAPSULE BY MOUTH DAILY, Disp: 30 capsule, Rfl: 3  •  tiotropium (SPIRIVA) 18 MCG per inhalation capsule, Place 1 capsule into inhaler and inhale 1 (one) time daily., Disp: , Rfl:     The following portions of the patient's history were reviewed and updated as appropriate: allergies, current medications, past family history, past medical history, past social history, past surgical history and problem list.    Review of Systems   Constitutional: Negative.    Respiratory: Positive for cough, shortness of breath and wheezing.         Baseline cough and wheeze   Cardiovascular: Negative.    Gastrointestinal: Negative.    Musculoskeletal: Negative.    Skin: Negative.    Neurological: Negative.    Psychiatric/Behavioral: Negative.        Objective   Physical Exam   Constitutional: He is oriented to person, place, and time. He appears well-nourished.   Neck: Neck supple.   Cardiovascular: Normal rate, regular rhythm and normal heart sounds.    Pulmonary/Chest: Effort normal. He has wheezes.   Abdominal: Bowel sounds are normal.   Neurological: He is alert and oriented to person,  place, and time.   Skin: Skin is warm.   Psychiatric: He has a normal mood and affect.       All tests have been reviewed.    Assessment/Plan   There are no diagnoses linked to this encounter.            COPD continue inhaler chronic prednisone 5 mg daily considering patient has recurrent bronchitis COPD exacerbation. Continue meds  osteopenia, oscal D 500mg bid we will discuss bone density scan schedule---  Weight loss stable now  left low back pain follow up with ortho s/p left hip surgery for severe OA doing well.   alkaline phosphatase still high watch---  divertic hemorroid on colon 1/2013  DM CONTINUE MEDICINE  phimosis seen by uro declined surgery  Hematuria followup with urologist s/p cystoscope bladder stones  Hypertension high today, Need the medication confirmation.Patient is audrey  BPH enlarged on CT scan follow up with urologist.continue flomax   Anemia workup all negative.normal now. It is anemia of chronic disease due to chronic kidney disease followup with nephrologist  CKD follow up with kidney doctor  History of the CAD atrial fibrillation VSD status post occlusion stable now. Status post a defibrillator seen by cardio follow up with cardio  History of a TIA patient is on Plavix continue  pneumovax done, ,zostavax to pharmacy, prevnar 13 1/19/17, Td to HD.  Depression cont lexapro 20  Decline to disrobe for PE  right inquinal hernia decline surgery now, call if worse  Diarrhea avoid dairy product and probiotic and fiber improved  2mo

## 2017-03-15 DIAGNOSIS — Z00.00 HEALTH MAINTENANCE EXAMINATION: Primary | ICD-10-CM

## 2017-03-16 ENCOUNTER — TELEPHONE (OUTPATIENT)
Dept: INTERNAL MEDICINE | Facility: CLINIC | Age: 67
End: 2017-03-16

## 2017-03-18 ENCOUNTER — HOSPITAL ENCOUNTER (INPATIENT)
Facility: HOSPITAL | Age: 67
LOS: 5 days | Discharge: NURSING, REHAB OR LONG-TERM CARE | End: 2017-03-23
Attending: EMERGENCY MEDICINE | Admitting: INTERNAL MEDICINE

## 2017-03-18 ENCOUNTER — APPOINTMENT (OUTPATIENT)
Dept: GENERAL RADIOLOGY | Facility: HOSPITAL | Age: 67
End: 2017-03-18

## 2017-03-18 DIAGNOSIS — Z74.09 IMPAIRED FUNCTIONAL MOBILITY, BALANCE, GAIT, AND ENDURANCE: ICD-10-CM

## 2017-03-18 DIAGNOSIS — Z78.9 IMPAIRED MOBILITY AND ADLS: ICD-10-CM

## 2017-03-18 DIAGNOSIS — J44.1 COPD WITH EXACERBATION (HCC): Primary | ICD-10-CM

## 2017-03-18 DIAGNOSIS — Z74.09 IMPAIRED MOBILITY AND ADLS: ICD-10-CM

## 2017-03-18 LAB
ALBUMIN SERPL-MCNC: 4 G/DL (ref 3.5–5)
ALBUMIN/GLOB SERPL: 1.4 G/DL (ref 1–2)
ALP SERPL-CCNC: 75 U/L (ref 38–126)
ALT SERPL W P-5'-P-CCNC: 28 U/L (ref 13–69)
ANION GAP SERPL CALCULATED.3IONS-SCNC: 9.1 MMOL/L
ARTERIAL PATENCY WRIST A: POSITIVE
AST SERPL-CCNC: 31 U/L (ref 15–46)
BASE EXCESS BLDA CALC-SCNC: 11.5 MMOL/L
BASOPHILS # BLD AUTO: 0.03 10*3/MM3 (ref 0–0.2)
BASOPHILS NFR BLD AUTO: 0.2 % (ref 0–2.5)
BDY SITE: ABNORMAL
BILIRUB SERPL-MCNC: 1 MG/DL (ref 0.2–1.3)
BUN BLD-MCNC: 32 MG/DL (ref 7–20)
BUN/CREAT SERPL: 32 (ref 6.3–21.9)
CALCIUM SPEC-SCNC: 9.2 MG/DL (ref 8.4–10.2)
CHLORIDE SERPL-SCNC: 99 MMOL/L (ref 98–107)
CK MB SERPL-CCNC: 3.2 NG/ML (ref 0.2–2.4)
CK MB SERPL-RTO: 5.2 % (ref 0–2)
CK SERPL-CCNC: 61 U/L (ref 30–170)
CO2 SERPL-SCNC: 40 MMOL/L (ref 26–30)
COHGB MFR BLD: 4.6 %
CREAT BLD-MCNC: 1 MG/DL (ref 0.6–1.3)
D-LACTATE SERPL-SCNC: 0.5 MMOL/L (ref 0.5–2)
DEPRECATED RDW RBC AUTO: 41.2 FL (ref 37–54)
EOSINOPHIL # BLD AUTO: 0.14 10*3/MM3 (ref 0–0.7)
EOSINOPHIL NFR BLD AUTO: 1.1 % (ref 0–7)
ERYTHROCYTE [DISTWIDTH] IN BLOOD BY AUTOMATED COUNT: 11.7 % (ref 11.5–14.5)
FLUAV AG NPH QL: NEGATIVE
FLUBV AG NPH QL IA: NEGATIVE
GFR SERPL CREATININE-BSD FRML MDRD: 75 ML/MIN/1.73
GLOBULIN UR ELPH-MCNC: 2.8 GM/DL
GLUCOSE BLD-MCNC: 148 MG/DL (ref 74–98)
HCO3 BLDA-SCNC: 37.4 MMOL/L (ref 22–28)
HCT VFR BLD AUTO: 41.1 % (ref 42–52)
HGB BLD-MCNC: 12.9 G/DL (ref 14–18)
HGB BLDA-MCNC: 12.8 G/DL (ref 12–18)
HOROWITZ INDEX BLD+IHG-RTO: 32 %
IMM GRANULOCYTES # BLD: 0.05 10*3/MM3 (ref 0–0.06)
IMM GRANULOCYTES NFR BLD: 0.4 % (ref 0–0.6)
LYMPHOCYTES # BLD AUTO: 1.06 10*3/MM3 (ref 0.6–3.4)
LYMPHOCYTES NFR BLD AUTO: 8.6 % (ref 10–50)
MAGNESIUM SERPL-MCNC: 2 MG/DL (ref 1.6–2.3)
MCH RBC QN AUTO: 30.4 PG (ref 27–31)
MCHC RBC AUTO-ENTMCNC: 31.4 G/DL (ref 30–37)
MCV RBC AUTO: 96.7 FL (ref 80–94)
METHGB BLD QL: 0.8 %
MODALITY: ABNORMAL
MONOCYTES # BLD AUTO: 1.33 10*3/MM3 (ref 0–0.9)
MONOCYTES NFR BLD AUTO: 10.8 % (ref 0–12)
NEUTROPHILS # BLD AUTO: 9.67 10*3/MM3 (ref 2–6.9)
NEUTROPHILS NFR BLD AUTO: 78.9 % (ref 37–80)
NRBC BLD MANUAL-RTO: 0 /100 WBC (ref 0–0)
NT-PROBNP SERPL-MCNC: 384 PG/ML (ref 0–125)
OXYHGB MFR BLDV: 92.1 % (ref 94–99)
PCO2 BLDA: 70.5 MM HG (ref 35–45)
PH BLDA: 7.33 PH UNITS
PLATELET # BLD AUTO: 155 10*3/MM3 (ref 130–400)
PMV BLD AUTO: 9.3 FL (ref 6–12)
PO2 BLDA: 87.7 MM HG (ref 75–100)
POTASSIUM BLD-SCNC: 4.1 MMOL/L (ref 3.5–5.1)
PROT SERPL-MCNC: 6.8 G/DL (ref 6.3–8.2)
RBC # BLD AUTO: 4.25 10*6/MM3 (ref 4.7–6.1)
SODIUM BLD-SCNC: 144 MMOL/L (ref 137–145)
TROPONIN I SERPL-MCNC: 0.06 NG/ML (ref 0–0.03)
WBC NRBC COR # BLD: 12.28 10*3/MM3 (ref 4.8–10.8)

## 2017-03-18 PROCEDURE — 82805 BLOOD GASES W/O2 SATURATION: CPT

## 2017-03-18 PROCEDURE — 82375 ASSAY CARBOXYHB QUANT: CPT

## 2017-03-18 PROCEDURE — G0378 HOSPITAL OBSERVATION PER HR: HCPCS

## 2017-03-18 PROCEDURE — 25010000002 CEFTRIAXONE: Performed by: EMERGENCY MEDICINE

## 2017-03-18 PROCEDURE — 87804 INFLUENZA ASSAY W/OPTIC: CPT | Performed by: EMERGENCY MEDICINE

## 2017-03-18 PROCEDURE — 71010 HC CHEST PA OR AP: CPT

## 2017-03-18 PROCEDURE — 83880 ASSAY OF NATRIURETIC PEPTIDE: CPT

## 2017-03-18 PROCEDURE — 84484 ASSAY OF TROPONIN QUANT: CPT

## 2017-03-18 PROCEDURE — 93005 ELECTROCARDIOGRAM TRACING: CPT

## 2017-03-18 PROCEDURE — 83735 ASSAY OF MAGNESIUM: CPT | Performed by: EMERGENCY MEDICINE

## 2017-03-18 PROCEDURE — 99284 EMERGENCY DEPT VISIT MOD MDM: CPT

## 2017-03-18 PROCEDURE — 94640 AIRWAY INHALATION TREATMENT: CPT

## 2017-03-18 PROCEDURE — 80053 COMPREHEN METABOLIC PANEL: CPT

## 2017-03-18 PROCEDURE — 82553 CREATINE MB FRACTION: CPT | Performed by: EMERGENCY MEDICINE

## 2017-03-18 PROCEDURE — 25010000002 METHYLPREDNISOLONE PER 125 MG: Performed by: EMERGENCY MEDICINE

## 2017-03-18 PROCEDURE — 83050 HGB METHEMOGLOBIN QUAN: CPT

## 2017-03-18 PROCEDURE — 36415 COLL VENOUS BLD VENIPUNCTURE: CPT

## 2017-03-18 PROCEDURE — 36600 WITHDRAWAL OF ARTERIAL BLOOD: CPT

## 2017-03-18 PROCEDURE — 83605 ASSAY OF LACTIC ACID: CPT

## 2017-03-18 PROCEDURE — 82550 ASSAY OF CK (CPK): CPT | Performed by: EMERGENCY MEDICINE

## 2017-03-18 PROCEDURE — 85025 COMPLETE CBC W/AUTO DIFF WBC: CPT

## 2017-03-18 RX ORDER — METHYLPREDNISOLONE SODIUM SUCCINATE 125 MG/2ML
125 INJECTION, POWDER, LYOPHILIZED, FOR SOLUTION INTRAMUSCULAR; INTRAVENOUS ONCE
Status: COMPLETED | OUTPATIENT
Start: 2017-03-18 | End: 2017-03-18

## 2017-03-18 RX ORDER — IPRATROPIUM BROMIDE AND ALBUTEROL SULFATE 2.5; .5 MG/3ML; MG/3ML
6 SOLUTION RESPIRATORY (INHALATION) ONCE
Status: COMPLETED | OUTPATIENT
Start: 2017-03-18 | End: 2017-03-18

## 2017-03-18 RX ORDER — SODIUM CHLORIDE 0.9 % (FLUSH) 0.9 %
10 SYRINGE (ML) INJECTION AS NEEDED
Status: DISCONTINUED | OUTPATIENT
Start: 2017-03-18 | End: 2017-03-23 | Stop reason: HOSPADM

## 2017-03-18 RX ORDER — ACETAMINOPHEN 325 MG/1
650 TABLET ORAL ONCE
Status: COMPLETED | OUTPATIENT
Start: 2017-03-18 | End: 2017-03-18

## 2017-03-18 RX ADMIN — IPRATROPIUM BROMIDE AND ALBUTEROL SULFATE 6 ML: .5; 3 SOLUTION RESPIRATORY (INHALATION) at 22:10

## 2017-03-18 RX ADMIN — CEFTRIAXONE 1 G: 1 INJECTION, POWDER, FOR SOLUTION INTRAMUSCULAR; INTRAVENOUS at 23:26

## 2017-03-18 RX ADMIN — METHYLPREDNISOLONE SODIUM SUCCINATE 125 MG: 125 INJECTION, POWDER, FOR SOLUTION INTRAMUSCULAR; INTRAVENOUS at 22:06

## 2017-03-18 RX ADMIN — ACETAMINOPHEN 650 MG: 325 TABLET, FILM COATED ORAL at 23:52

## 2017-03-19 LAB
ALBUMIN SERPL-MCNC: 3.6 G/DL (ref 3.5–5)
ALBUMIN/GLOB SERPL: 1.4 G/DL (ref 1–2)
ALP SERPL-CCNC: 66 U/L (ref 38–126)
ALT SERPL W P-5'-P-CCNC: 28 U/L (ref 13–69)
ANION GAP SERPL CALCULATED.3IONS-SCNC: 8.2 MMOL/L
ARTERIAL PATENCY WRIST A: POSITIVE
AST SERPL-CCNC: 27 U/L (ref 15–46)
ATMOSPHERIC PRESS: 742 MMHG
BASE EXCESS BLDA CALC-SCNC: 10.9 MMOL/L
BASOPHILS # BLD AUTO: 0.02 10*3/MM3 (ref 0–0.2)
BASOPHILS NFR BLD AUTO: 0.2 % (ref 0–2.5)
BDY SITE: ABNORMAL
BILIRUB SERPL-MCNC: 0.9 MG/DL (ref 0.2–1.3)
BUN BLD-MCNC: 27 MG/DL (ref 7–20)
BUN/CREAT SERPL: 30 (ref 6.3–21.9)
CALCIUM SPEC-SCNC: 8.7 MG/DL (ref 8.4–10.2)
CHLORIDE SERPL-SCNC: 103 MMOL/L (ref 98–107)
CO2 SERPL-SCNC: 36 MMOL/L (ref 26–30)
CREAT BLD-MCNC: 0.9 MG/DL (ref 0.6–1.3)
DEPRECATED RDW RBC AUTO: 39.9 FL (ref 37–54)
EOSINOPHIL # BLD AUTO: 0 10*3/MM3 (ref 0–0.7)
EOSINOPHIL NFR BLD AUTO: 0 % (ref 0–7)
ERYTHROCYTE [DISTWIDTH] IN BLOOD BY AUTOMATED COUNT: 11.7 % (ref 11.5–14.5)
GFR SERPL CREATININE-BSD FRML MDRD: 84 ML/MIN/1.73
GLOBULIN UR ELPH-MCNC: 2.6 GM/DL
GLUCOSE BLD-MCNC: 143 MG/DL (ref 74–98)
HCO3 BLDA-SCNC: 36.2 MMOL/L (ref 22–28)
HCT VFR BLD AUTO: 38.6 % (ref 42–52)
HGB BLD-MCNC: 12.4 G/DL (ref 14–18)
HGB BLDA-MCNC: 12.5 G/DL (ref 12–18)
HOLD SPECIMEN: NORMAL
HOROWITZ INDEX BLD+IHG-RTO: 36 %
IMM GRANULOCYTES # BLD: 0.09 10*3/MM3 (ref 0–0.06)
IMM GRANULOCYTES NFR BLD: 1 % (ref 0–0.6)
LYMPHOCYTES # BLD AUTO: 0.48 10*3/MM3 (ref 0.6–3.4)
LYMPHOCYTES NFR BLD AUTO: 5.1 % (ref 10–50)
MCH RBC QN AUTO: 30.5 PG (ref 27–31)
MCHC RBC AUTO-ENTMCNC: 32.1 G/DL (ref 30–37)
MCV RBC AUTO: 94.8 FL (ref 80–94)
MODALITY: ABNORMAL
MONOCYTES # BLD AUTO: 0.13 10*3/MM3 (ref 0–0.9)
MONOCYTES NFR BLD AUTO: 1.4 % (ref 0–12)
NEUTROPHILS # BLD AUTO: 8.63 10*3/MM3 (ref 2–6.9)
NEUTROPHILS NFR BLD AUTO: 92.3 % (ref 37–80)
NRBC BLD MANUAL-RTO: 0 /100 WBC (ref 0–0)
PCO2 BLDA: 62.6 MM HG (ref 35–45)
PH BLDA: 7.37 PH UNITS
PLATELET # BLD AUTO: 168 10*3/MM3 (ref 130–400)
PMV BLD AUTO: 9.8 FL (ref 6–12)
PO2 BLDA: 72.1 MM HG (ref 75–100)
POTASSIUM BLD-SCNC: 4.2 MMOL/L (ref 3.5–5.1)
PROT SERPL-MCNC: 6.2 G/DL (ref 6.3–8.2)
RBC # BLD AUTO: 4.07 10*6/MM3 (ref 4.7–6.1)
SAO2 % BLDCOA: 95.5 %
SODIUM BLD-SCNC: 143 MMOL/L (ref 137–145)
WBC NRBC COR # BLD: 9.35 10*3/MM3 (ref 4.8–10.8)
WHOLE BLOOD HOLD SPECIMEN: NORMAL

## 2017-03-19 PROCEDURE — 94799 UNLISTED PULMONARY SVC/PX: CPT

## 2017-03-19 PROCEDURE — 94640 AIRWAY INHALATION TREATMENT: CPT

## 2017-03-19 PROCEDURE — 25010000002 AZITHROMYCIN: Performed by: EMERGENCY MEDICINE

## 2017-03-19 PROCEDURE — 99222 1ST HOSP IP/OBS MODERATE 55: CPT | Performed by: INTERNAL MEDICINE

## 2017-03-19 PROCEDURE — 85025 COMPLETE CBC W/AUTO DIFF WBC: CPT | Performed by: INTERNAL MEDICINE

## 2017-03-19 PROCEDURE — 36600 WITHDRAWAL OF ARTERIAL BLOOD: CPT

## 2017-03-19 PROCEDURE — 25010000002 AZITHROMYCIN: Performed by: INTERNAL MEDICINE

## 2017-03-19 PROCEDURE — 80053 COMPREHEN METABOLIC PANEL: CPT | Performed by: INTERNAL MEDICINE

## 2017-03-19 PROCEDURE — 82805 BLOOD GASES W/O2 SATURATION: CPT

## 2017-03-19 PROCEDURE — 25010000002 METHYLPREDNISOLONE PER 40 MG: Performed by: INTERNAL MEDICINE

## 2017-03-19 PROCEDURE — 25010000002 ENOXAPARIN PER 10 MG: Performed by: INTERNAL MEDICINE

## 2017-03-19 PROCEDURE — 25010000002 METHYLPREDNISOLONE PER 125 MG: Performed by: INTERNAL MEDICINE

## 2017-03-19 PROCEDURE — G0378 HOSPITAL OBSERVATION PER HR: HCPCS

## 2017-03-19 PROCEDURE — 99225 PR SBSQ OBSERVATION CARE/DAY 25 MINUTES: CPT | Performed by: INTERNAL MEDICINE

## 2017-03-19 RX ORDER — METHYLPREDNISOLONE SODIUM SUCCINATE 40 MG/ML
40 INJECTION, POWDER, LYOPHILIZED, FOR SOLUTION INTRAMUSCULAR; INTRAVENOUS EVERY 8 HOURS
Status: DISCONTINUED | OUTPATIENT
Start: 2017-03-19 | End: 2017-03-21

## 2017-03-19 RX ORDER — CLOPIDOGREL BISULFATE 75 MG/1
75 TABLET ORAL DAILY
Status: DISCONTINUED | OUTPATIENT
Start: 2017-03-19 | End: 2017-03-23 | Stop reason: HOSPADM

## 2017-03-19 RX ORDER — ASPIRIN 81 MG/1
81 TABLET, CHEWABLE ORAL ONCE
Status: COMPLETED | OUTPATIENT
Start: 2017-03-19 | End: 2017-03-19

## 2017-03-19 RX ORDER — BUDESONIDE AND FORMOTEROL FUMARATE DIHYDRATE 80; 4.5 UG/1; UG/1
2 AEROSOL RESPIRATORY (INHALATION)
Status: DISCONTINUED | OUTPATIENT
Start: 2017-03-19 | End: 2017-03-23 | Stop reason: HOSPADM

## 2017-03-19 RX ORDER — TAMSULOSIN HYDROCHLORIDE 0.4 MG/1
0.4 CAPSULE ORAL DAILY
Status: DISCONTINUED | OUTPATIENT
Start: 2017-03-19 | End: 2017-03-23 | Stop reason: HOSPADM

## 2017-03-19 RX ORDER — MAGNESIUM HYDROXIDE/ALUMINUM HYDROXICE/SIMETHICONE 120; 1200; 1200 MG/30ML; MG/30ML; MG/30ML
30 SUSPENSION ORAL EVERY 6 HOURS PRN
Status: DISCONTINUED | OUTPATIENT
Start: 2017-03-19 | End: 2017-03-23 | Stop reason: HOSPADM

## 2017-03-19 RX ORDER — ACETAMINOPHEN 325 MG/1
650 TABLET ORAL EVERY 4 HOURS PRN
Status: DISCONTINUED | OUTPATIENT
Start: 2017-03-19 | End: 2017-03-23 | Stop reason: HOSPADM

## 2017-03-19 RX ORDER — SODIUM CHLORIDE 9 MG/ML
100 INJECTION, SOLUTION INTRAVENOUS CONTINUOUS
Status: ACTIVE | OUTPATIENT
Start: 2017-03-19 | End: 2017-03-20

## 2017-03-19 RX ORDER — ALBUTEROL SULFATE 2.5 MG/3ML
2.5 SOLUTION RESPIRATORY (INHALATION) EVERY 6 HOURS PRN
Status: DISCONTINUED | OUTPATIENT
Start: 2017-03-19 | End: 2017-03-23 | Stop reason: HOSPADM

## 2017-03-19 RX ORDER — CARVEDILOL 6.25 MG/1
6.25 TABLET ORAL 2 TIMES DAILY WITH MEALS
Status: DISCONTINUED | OUTPATIENT
Start: 2017-03-19 | End: 2017-03-23 | Stop reason: HOSPADM

## 2017-03-19 RX ORDER — ALPRAZOLAM 0.5 MG/1
0.5 TABLET ORAL NIGHTLY PRN
Status: DISCONTINUED | OUTPATIENT
Start: 2017-03-19 | End: 2017-03-23 | Stop reason: HOSPADM

## 2017-03-19 RX ORDER — ATORVASTATIN CALCIUM 40 MG/1
40 TABLET, FILM COATED ORAL DAILY
Status: DISCONTINUED | OUTPATIENT
Start: 2017-03-19 | End: 2017-03-23 | Stop reason: HOSPADM

## 2017-03-19 RX ADMIN — BUDESONIDE AND FORMOTEROL FUMARATE DIHYDRATE 2 PUFF: 80; 4.5 AEROSOL RESPIRATORY (INHALATION) at 20:00

## 2017-03-19 RX ADMIN — SODIUM CHLORIDE 100 ML/HR: 9 INJECTION, SOLUTION INTRAVENOUS at 21:47

## 2017-03-19 RX ADMIN — ALBUTEROL SULFATE 2.5 MG: 2.5 SOLUTION RESPIRATORY (INHALATION) at 19:58

## 2017-03-19 RX ADMIN — CARVEDILOL 6.25 MG: 6.25 TABLET, FILM COATED ORAL at 08:41

## 2017-03-19 RX ADMIN — ALBUTEROL SULFATE 2.5 MG: 2.5 SOLUTION RESPIRATORY (INHALATION) at 09:33

## 2017-03-19 RX ADMIN — BUDESONIDE AND FORMOTEROL FUMARATE DIHYDRATE 2 PUFF: 80; 4.5 AEROSOL RESPIRATORY (INHALATION) at 08:21

## 2017-03-19 RX ADMIN — ACETAMINOPHEN 650 MG: 325 TABLET ORAL at 20:24

## 2017-03-19 RX ADMIN — SODIUM CHLORIDE 100 ML/HR: 9 INJECTION, SOLUTION INTRAVENOUS at 01:05

## 2017-03-19 RX ADMIN — SODIUM CHLORIDE 100 ML/HR: 9 INJECTION, SOLUTION INTRAVENOUS at 10:58

## 2017-03-19 RX ADMIN — ATORVASTATIN CALCIUM 40 MG: 40 TABLET, FILM COATED ORAL at 08:41

## 2017-03-19 RX ADMIN — ALBUTEROL SULFATE 2.5 MG: 2.5 SOLUTION RESPIRATORY (INHALATION) at 16:25

## 2017-03-19 RX ADMIN — METHYLPREDNISOLONE SODIUM SUCCINATE 40 MG: 125 INJECTION, POWDER, FOR SOLUTION INTRAMUSCULAR; INTRAVENOUS at 23:34

## 2017-03-19 RX ADMIN — ACETAMINOPHEN 650 MG: 325 TABLET ORAL at 16:16

## 2017-03-19 RX ADMIN — AZITHROMYCIN 500 MG: 500 INJECTION, POWDER, LYOPHILIZED, FOR SOLUTION INTRAVENOUS at 00:20

## 2017-03-19 RX ADMIN — METHYLPREDNISOLONE SODIUM SUCCINATE 40 MG: 125 INJECTION, POWDER, FOR SOLUTION INTRAMUSCULAR; INTRAVENOUS at 14:07

## 2017-03-19 RX ADMIN — ACETAMINOPHEN 650 MG: 325 TABLET ORAL at 12:14

## 2017-03-19 RX ADMIN — AZITHROMYCIN 500 MG: 500 INJECTION, POWDER, LYOPHILIZED, FOR SOLUTION INTRAVENOUS at 23:34

## 2017-03-19 RX ADMIN — ENOXAPARIN SODIUM 40 MG: 40 INJECTION SUBCUTANEOUS at 08:41

## 2017-03-19 RX ADMIN — ALPRAZOLAM 0.5 MG: 0.5 TABLET ORAL at 20:30

## 2017-03-19 RX ADMIN — ALBUTEROL SULFATE 2.5 MG: 2.5 SOLUTION RESPIRATORY (INHALATION) at 01:02

## 2017-03-19 RX ADMIN — CLOPIDOGREL BISULFATE 75 MG: 75 TABLET ORAL at 08:41

## 2017-03-19 RX ADMIN — METHYLPREDNISOLONE SODIUM SUCCINATE 40 MG: 125 INJECTION, POWDER, FOR SOLUTION INTRAMUSCULAR; INTRAVENOUS at 05:16

## 2017-03-19 RX ADMIN — TAMSULOSIN HYDROCHLORIDE 0.4 MG: 0.4 CAPSULE ORAL at 08:41

## 2017-03-19 RX ADMIN — ASPIRIN 81 MG 81 MG: 81 TABLET ORAL at 01:05

## 2017-03-19 RX ADMIN — CARVEDILOL 6.25 MG: 6.25 TABLET, FILM COATED ORAL at 17:06

## 2017-03-19 NOTE — PLAN OF CARE
Problem: Patient Care Overview (Adult)  Goal: Plan of Care Review  Outcome: Ongoing (interventions implemented as appropriate)  New admit spo2 92% on O2@4l   Goal: Adult Individualization and Mutuality  Outcome: Ongoing (interventions implemented as appropriate)    Problem: COPD, Chronic Bronchitis/Emphysema (Adult)  Goal: Signs and Symptoms of Listed Potential Problems Will be Absent or Manageable (COPD, Chronic Bronchitis/Emphysema)  Outcome: Ongoing (interventions implemented as appropriate)

## 2017-03-19 NOTE — PLAN OF CARE
Problem: Patient Care Overview (Adult)  Goal: Plan of Care Review  Outcome: Ongoing (interventions implemented as appropriate)    03/19/17 4040   Coping/Psychosocial Response Interventions   Plan Of Care Reviewed With patient   Patient Care Overview   Progress no change   Outcome Evaluation   Outcome Summary/Follow up Plan pt continue to be short of breath and decrease lung sounds         Problem: COPD, Chronic Bronchitis/Emphysema (Adult)  Goal: Signs and Symptoms of Listed Potential Problems Will be Absent or Manageable (COPD, Chronic Bronchitis/Emphysema)  Outcome: Ongoing (interventions implemented as appropriate)

## 2017-03-19 NOTE — H&P
[facility]   HISTORY AND PHYSICAL      Name:  Thurman Mendel Parsons   Age:  66 y.o.  Sex:  male  :  1950  MRN:  [id number]   Visit Number:  [id number]  Admission Date:  3/18/2017  Date Of Service:  17  Primary Care Physician:  Miguel Rojas MD     Admitting diagnosis:    Principal Problem:    COPD with exacerbation  Active Problems:    Atherosclerotic heart disease of native coronary artery without angina pectoris    Chronic kidney disease, stage III (moderate)    Steroid-dependent COPD    Degeneration of cervical intervertebral disc    GERD without esophagitis    Hiatal hernia    Hyperlipidemia    Hypertension    Chronic respiratory failure    Acute bronchitis    History Of Presenting Illness:      Mr. Pena came to the ER because of cough shortness of breath and dyspnea.  He states that he started getting worse just in the last 1-2 days with cough which has been productive with some greenish tinge.  He denies any fever or chills he has felt short of breath and breathlessness and does use oxygen 4 L at home and has COPD.  He has been hypoxemic was found to be initially hypoxic with her saturation low around 80s and was given nebulizer treatment and bronchodilator and has helped.  ABG was done which showed his PCO2 was 70 and he is being further admitted for his exacerbation of COPD.  There has been no pneumonia or infiltrate seen on the x-ray patient does not have any chest pain.  No nausea vomiting he states that has been no palpitations.  Other review of systems unremarkable except as stated above.  He got the Rocephin and Zithromax and Solu-Medrol and has been on telemetry and feels a little better with the neb treatment and the steroid.    Review Of Systems:     The following systems were reviewed and negative;  constitution, eyes, ENT, respiratory, cardiovascular, gastrointestinal, genitourinary, musculoskeletal, neurological and behavioral/psych,  Skin except  as above.     Past Medical History:    Past Medical History   Diagnosis Date   • Abnormal liver enzymes    • Acute bronchitis    • Anemia    • Anxiety    • Arthritis    • Atherosclerotic heart disease of native coronary artery without angina pectoris    • Atrial fibrillation    • Atypical chest pain    • Back pain    • BPH (benign prostatic hyperplasia)    • Cardiac pain    • Chronic bronchitis    • Chronic kidney disease    • COPD (chronic obstructive pulmonary disease)    • Degeneration of cervical intervertebral disc    • Depression    • Diabetes mellitus    • Diverticulosis    • Dyspnea    • Esophageal reflux    • Esophagitis    • Gastritis    • Hematuria    • Hemorrhoids    • Hiatal hernia    • History of arthritis    • History of myocardial infarction    • History of peripheral neuropathy    • History of ventricular septal defect    • History of ventricular septal defect    • Hyperlipidemia    • Hypertension    • Infectious diarrhea    • Infectious diarrhea    • Kyphosis, acquired    • Lumbar radiculopathy    • Mitral and aortic valve disease    • Myocardial infarction    • Nephrolithiasis    • Phimosis    • Respiratory failure    • Sleep apnea    • Stroke syndrome    • Stroke syndrome    • Urinary calculus    • Urinary tract infection    • Ventral hernia        Past Surgical history:    Past Surgical History   Procedure Laterality Date   • Hernia repair     • Suprapubic catheter insertion       History of Percutaneous Catheter Placement Into Ureter   • Throat surgery     • Vsd repair       History of VSD Repair By Patch Closure       Social History:    Social History     Social History   • Marital status:      Spouse name: N/A   • Number of children: N/A   • Years of education: N/A     Occupational History   •  Retired     Social History Main Topics   • Smoking status: Former Smoker   • Smokeless tobacco: None   • Alcohol use No   • Drug use: No   • Sexual activity: Defer     Other Topics Concern   •  None     Social History Narrative       Family History:    Family History   Problem Relation Age of Onset   • Other Father      CABG   • Coronary artery disease Father          Allergies:      Sulfa antibiotics    Home Medications:    Prior to Admission Medications     Prescriptions Last Dose Informant Patient Reported? Taking?    albuterol (PROVENTIL) (2.5 MG/3ML) 0.083% nebulizer solution   Yes No    Albuterol Sulfate (2.5 MG/3ML) 0.083% Inhalation Nebulization Solution; Patient Sig: Albuterol Sulfate (2.5 MG/3ML) 0.083% Inhalation Nebulization Solution USE 1 UNIT DOSE EVERY 4-6 HOURS AS NEEDED FOR WHEEZING .; 3; 5; 04-Sep-2012; Active    alfuzosin (UROXATRAL) 10 MG 24 hr tablet   No No    TAKE ONE TABLET BY MOUTH EVERY MORNING    ALPRAZolam (XANAX) 0.5 MG tablet   No No    Take 1 tablet by mouth At Night As Needed for anxiety.    amLODIPine (NORVASC) 5 MG tablet   No No    TAKE ONE TABLET BY MOUTH DAILY    amLODIPine-benazepril (LOTREL) 5-40 MG per capsule   Yes No    Take 1 capsule by mouth daily.    aspirin 81 MG tablet   Yes No    Take 81 mg by mouth daily.    atorvastatin (LIPITOR) 40 MG tablet   No No    Take 1 tablet by mouth Daily.    BREO ELLIPTA 200-25 MCG/INH aerosol powder    Yes No    carvedilol (COREG) 6.25 MG tablet   No No    Take 1 tablet by mouth 2 (Two) Times a Day With Meals.    cefuroxime (CEFTIN) 500 MG tablet   Yes No    clopidogrel (PLAVIX) 75 MG tablet   No No    Take 1 tablet by mouth Daily.    cyclobenzaprine (FLEXERIL) 10 MG tablet   Yes No    Take  by mouth.    escitalopram (LEXAPRO) 10 MG tablet   No No    Take 1 tablet by mouth Daily.    famotidine (PEPCID) 20 MG tablet   No No    TAKE ONE TABLET BY MOUTH DAILY ** NEED TO CALL PRIMARY DOCTOR FOR REFILLS    fluticasone (FLONASE) 50 MCG/ACT nasal spray   Yes No    iron polysaccharides (POLY-IRON 150) 150 MG capsule   Yes No    Take  by mouth daily.    losartan (COZAAR) 100 MG tablet   No No    Take 1 tablet by mouth daily.     nitroglycerin (NITROSTAT) 0.4 MG SL tablet   Yes No    Place  under the tongue.    oxybutynin (DITROPAN) 5 MG tablet   No No    TAKE ONE TABLET BY MOUTH AT BEDTIME    predniSONE (DELTASONE) 10 MG tablet   Yes No    PROAIR  (90 BASE) MCG/ACT inhaler   Yes No    tamsulosin (FLOMAX) 0.4 MG capsule 24 hr capsule   No No    TAKE ONE CAPSULE BY MOUTH DAILY    tiotropium (SPIRIVA) 18 MCG per inhalation capsule   Yes No    Place 1 capsule into inhaler and inhale 1 (one) time daily.                 Vital Signs:    Temp:  [98.3 °F (36.8 °C)] 98.3 °F (36.8 °C)  Heart Rate:  [79-96] 81  Resp:  [22] 22  BP: (136-181)/(66-74) 136/66    Last 3 weights    03/18/17  2140   Weight: 140 lb (63.5 kg)       Body mass index is 21.29 kg/(m^2).    Physical Exam:      General Appearance:    Alert and cooperative,  And lying comfortably in bed   Head:    Atraumatic and normocephalic, without obvious abnormality.   Eyes:            PERRLA, conjunctivae and sclerae normal, no Icterus. No pallor. Extra-occular movements are within normal limits.   Ears:    Ears appear intact with no abnormalities noted.   Throat:   No oral lesions, no thrush, oral mucosa moist.   Neck:   Supple, trachea midline, no thyromegaly, no carotid bruit, no lymphadenopathy   Lungs:     Chest shape is normal. Breath sounds heard bilaterally equally.  Mild expiratory wheezing. No Pleural rub or bronchial breathing.      Heart:    Normal S1 and S2, no murmur, no gallop, no rub. No JVD   Abdomen:     Normal bowel sounds, no masses, no organomegaly. Soft        non-tender, non-distended, no guarding, no rebound                tenderness   Extremities:   Moves all extremities well, no edema, no cyanosis, no             clubbing   Skin:   No  bruising or rash   Neurologic:   Cranial nerves 2 - 12 grossly intact, sensation intact, Motor power is normal and equal bilaterally.       EKG:      No acute ischemic findings    Labs:    Lab Results (last 24 hours)     Procedure  Component Value Units Date/Time    [place] Draw [[id number]] Collected:  03/18/17 2144    Specimen:  Blood Updated:  03/18/17 2148    Narrative:       The following orders were created for panel order [place] Draw.  Procedure                               Abnormality         Status                     ---------                               -----------         ------                     Light Blue Top[[id number]]                                                               Lavender Top[[id number]]                                      In process                 Gold Top - SST[[id number]]                                    In process                 Green Top (No Gel)[[id number]]                                                             Please view results for these tests on the individual orders.    Gold Top - SST [[id number]] Collected:  03/18/17 2144    Specimen:  Blood Updated:  03/18/17 2148    Lavender Top [[id number]] Collected:  03/18/17 2144    Specimen:  Blood Updated:  03/18/17 2148    CBC & Differential [[id number]] Collected:  03/18/17 2144    Specimen:  Blood Updated:  03/18/17 2152    Narrative:       The following orders were created for panel order CBC & Differential.  Procedure                               Abnormality         Status                     ---------                               -----------         ------                     CBC Auto Differential[[id number]]         Abnormal            Final result                 Please view results for these tests on the individual orders.    CBC Auto Differential [[id number]]  (Abnormal) Collected:  03/18/17 2144    Specimen:  Blood Updated:  03/18/17 2152     WBC 12.28 (H) 10*3/mm3      RBC 4.25 (L) 10*6/mm3      Hemoglobin 12.9 (L) g/dL      Hematocrit 41.1 (L) %      MCV 96.7 (H) fL      MCH 30.4 pg      MCHC 31.4 g/dL      RDW 11.7 %      RDW-SD 41.2 fl      MPV 9.3 fL      Platelets 155 10*3/mm3      Neutrophil % 78.9 %      Lymphocyte % 8.6 (L) %      Monocyte  % 10.8 %      Eosinophil % 1.1 %      Basophil % 0.2 %      Immature Grans % 0.4 %      Neutrophils, Absolute 9.67 (H) 10*3/mm3      Lymphocytes, Absolute 1.06 10*3/mm3      Monocytes, Absolute 1.33 (H) 10*3/mm3      Eosinophils, Absolute 0.14 10*3/mm3      Basophils, Absolute 0.03 10*3/mm3      Immature Grans, Absolute 0.05 10*3/mm3      nRBC 0.0 /100 WBC     Lactic Acid, Plasma [[id number]]  (Normal) Collected:  03/18/17 2144    Specimen:  Blood Updated:  03/18/17 2202     Lactate 0.5 mmol/L     Blood Gas, Arterial With Co-Ox [[id number]]  (Abnormal) Collected:  03/18/17 2204    Specimen:  Arterial Blood Updated:  03/18/17 2206     Site Arterial: left radial      Mynor's Test Positive      pH, Arterial 7.333 pH units      pCO2, Arterial 70.5 (H) mm Hg      pO2, Arterial 87.7 mm Hg      HCO3, Arterial 37.4 (H) mmol/L      Base Excess, Arterial 11.5 mmol/L      Hemoglobin, Blood Gas 12.8 g/dL      Oxyhemoglobin 92.1 (L) %      Methemoglobin 0.8 %      Carboxyhemoglobin 4.6 %      Modality Cannula - Nasal      FIO2 32 %     Troponin [[id number]]  (Abnormal) Collected:  03/18/17 2144    Specimen:  Blood Updated:  03/18/17 2215     Troponin I 0.055 (H) ng/mL     Narrative:       Normal Patient Upper Reference Limit (URL) (99th Percentile)=0.03 ng/mL   Non-AMI Illness Reference Limit=0.03-0.11 ng/mL   AMI Confirmation=0.12 ng/mL and above    Magnesium [[id number]]  (Normal) Collected:  03/18/17 2144    Specimen:  Blood Updated:  03/18/17 2215     Magnesium 2.0 mg/dL     Comprehensive Metabolic Panel [[id number]]  (Abnormal) Collected:  03/18/17 2144    Specimen:  Blood Updated:  03/18/17 2220     Glucose 148 (H) mg/dL      BUN 32 (H) mg/dL      Creatinine 1.00 mg/dL      Sodium 144 mmol/L      Potassium 4.1 mmol/L      Chloride 99 mmol/L      CO2 40.0 (H) mmol/L      Calcium 9.2 mg/dL      Total Protein 6.8 g/dL      Albumin 4.00 g/dL      ALT (SGPT) 28 U/L      AST (SGOT) 31 U/L      Alkaline Phosphatase 75 U/L      Total  Bilirubin 1.0 mg/dL      eGFR Non African Amer 75 mL/min/1.73      Globulin 2.8 gm/dL      A/G Ratio 1.4 g/dL      BUN/Creatinine Ratio 32.0 (H)      Anion Gap 9.1 mmol/L     Narrative:       Abnormal estimated GFR should be followed by more specific studies to confirm end stage chronic renal disease. The equation used for calculation may not be accurate for patients less than 19 years old, greater than 70 years old, patients at extremes of weight, malnutrition, or with acute renal dysfunction.    BNP [[id number]]  (Abnormal) Collected:  03/18/17 2144    Specimen:  Blood Updated:  03/18/17 2221     proBNP 384.0 (C) pg/mL     CK [[id number]]  (Normal) Collected:  03/18/17 2144    Specimen:  Blood Updated:  03/18/17 2224     Creatine Kinase 61 U/L     CK-MB [[id number]]  (Abnormal) Collected:  03/18/17 2144    Specimen:  Blood Updated:  03/18/17 2224     CKMB 3.20 (C) ng/mL     CK-MB Index [[id number]]  (Abnormal) Collected:  03/18/17 2144    Specimen:  Blood Updated:  03/18/17 2225     CK-MB Index 5.2 (H) %     Influenza Antigen [[id number]]  (Normal) Collected:  03/18/17 2207    Specimen:  Swab from Nasopharynx Updated:  03/18/17 2239     Influenza A Ag, EIA Negative      Influenza B Ag, EIA Negative           Radiology:    Imaging Results (last 72 hours)     Procedure Component Value Units Date/Time    XR Chest 1 View [[id number]]      Updated:  03/18/17 2216          Assessment:    Assessment/Plan     Principal Problem:    COPD with exacerbation  Active Problems:    Atherosclerotic heart disease of native coronary artery without angina pectoris    Chronic kidney disease, stage III (moderate)    Steroid-dependent COPD    Degeneration of cervical intervertebral disc    GERD without esophagitis    Hiatal hernia    Hyperlipidemia    Hypertension    Chronic respiratory failure        Plan:     #1 COPD exacerbation with hypercapnia and chronic respiratory failure he will be admitted to telemetry and started on bronchodilators and  IV steroids as he is steroid dependent and the BiPAP as tolerated.  Also for possible bronchopulmonary infection and acute bronchitis would benefit to start on Rocephin and Zithromax and will repeat x-ray in am.  #2 acute bronchitis.  With the Rocephin and Zithromax and close follow-up.  #3 diabetes is states he has borderline diabetes but with steroid and it would be elevated and will need to cover with fingerstick.  Also will do hemoglobin A1c in a.m.  Will continue rest of his other medication for his other medical problems and family will get the list from home in a.m.    Jermaine Perez MD  03/19/17  12:04 AM    Please note that portions of this note may have been completed with a voice recognition program. Efforts were made to edit the dictations, but occasionally words are mistranscribed.

## 2017-03-19 NOTE — PROGRESS NOTES
"Hospitalist Progress Note.    LOS: 0 days    Patient Care Team:  Miguel Rojas MD as PCP - General  Miguel Rojas MD as PCP - Family Medicine    Chief Complaint:    Chief Complaint   Patient presents with   • Shortness of Breath       Subjective   Patient seen and examined this morning.  Events from last night noted.  Patient denies having any fevers chills.  No nausea or vomiting no abdominal pain.  Denies any chest pain he still feels shortness of breath and has been coughing without much sputum production .  There is no significant edema.   Patient also denies having new onset weakness of numbness of either extremity.      Review of Systems:    The pertinent  ROS was done and it is noted above, rest  was negative.    Objective     Vital Signs  Visit Vitals   • /78 (BP Location: Left arm, Patient Position: Lying)   • Pulse 74   • Temp 98.8 °F (37.1 °C) (Oral)   • Resp 22   • Ht 68\" (172.7 cm)   • Wt 139 lb (63 kg)   • SpO2 97%   • BMI 21.13 kg/m2         I/O this shift:  In: 240 [P.O.:240]  Out: 300 [Urine:300]    Intake/Output Summary (Last 24 hours) at 03/19/17 1510  Last data filed at 03/19/17 0821   Gross per 24 hour   Intake   1540 ml   Output    725 ml   Net    815 ml       Physical Exam:    General Appearance: alert, oriented x 3, no acute distress,   HEENT: pupils round and reactive to light, oral mucosa dry, extra occular movements intact.  Neck: supple, no JVD, trachea midline  Lungs: Coarse breath sounds noted all over the chest as well as scattered wheezing and rhonchi.  Heart: RRR, normal S1 and S2, no S3, no rub  Abdomen: soft, non-tender, no palpable bladder, present bowel sounds to auscultation  Extremities: no edema, cyanosis or clubbing.   Neuro: normal speech and mental status, grossly non focal.     Results Review:      Results from last 7 days  Lab Units 03/19/17  0547 03/18/17  2144   SODIUM mmol/L 143 144   POTASSIUM mmol/L 4.2 4.1   CHLORIDE mmol/L 103 99   TOTAL CO2 mmol/L 36.0* " 40.0*   BUN mg/dL 27* 32*   CREATININE mg/dL 0.90 1.00   CALCIUM mg/dL 8.7 9.2   BILIRUBIN mg/dL 0.9 1.0   ALK PHOS U/L 66 75   ALT (SGPT) U/L 28 28   AST (SGOT) U/L 27 31   GLUCOSE mg/dL 143* 148*       Estimated Creatinine Clearance: 72.1 mL/min (by C-G formula based on Cr of 0.9).      Results from last 7 days  Lab Units 03/18/17  2144   MAGNESIUM mg/dL 2.0               Results from last 7 days  Lab Units 03/19/17  0547 03/18/17  2144   WBC 10*3/mm3 9.35 12.28*   HEMOGLOBIN g/dL 12.4* 12.9*   PLATELETS 10*3/mm3 168 155               Imaging Results (last 24 hours)     Procedure Component Value Units Date/Time    XR Chest 1 View [[id number]] Collected:  03/19/17 0834     Updated:  03/19/17 0837    Narrative:       PROCEDURE: XR CHEST 1 VW-     HISTORY: [facility]  triage protocol.     COMPARISON: December 31, 2012.      FINDINGS:    . Cardiomegaly is noted. The mediastinum is unremarkable.  There is a presumed stimulator which overlies the right thorax. The  lungs are somewhat hyperinflated likely representing COPD. There are  left lung base opacities which may represent atelectasis or pneumonia.  There is no pneumothorax. The bony thorax in intact.           Impression:       COPD.     Left lung base opacities consistent with atelectasis or pneumonia.        This report was finalized on 3/19/2017 8:35 AM by Jorge Luis Hanna MD.          atorvastatin 40 mg Oral Daily   azithromycin 500 mg Intravenous Q24H   budesonide-formoterol 2 puff Inhalation BID - RT   carvedilol 6.25 mg Oral BID With Meals   [START ON 3/20/2017] ceftriaxone 1 g Intravenous Q24H   clopidogrel 75 mg Oral Daily   enoxaparin 40 mg Subcutaneous Daily   methylPREDNISolone sodium succinate 40 mg Intravenous Q8H   tamsulosin 0.4 mg Oral Daily       sodium chloride 100 mL/hr Last Rate: 100 mL/hr (03/19/17 1058)       Medication Review:   Current Facility-Administered Medications   Medication Dose Route Frequency Provider Last Rate Last Dose   • acetaminophen  (TYLENOL) tablet 650 mg  650 mg Oral Q4H PRN Jermaine Perez MD   650 mg at 03/19/17 1214   • albuterol (PROVENTIL) nebulizer solution 0.083% 2.5 mg/3mL  2.5 mg Nebulization Q6H PRN Jermaine Perez MD   2.5 mg at 03/19/17 0933   • ALPRAZolam (XANAX) tablet 0.5 mg  0.5 mg Oral Nightly PRN Jermaine Perez MD       • aluminum-magnesium hydroxide-simethicone (MAALOX/MYLANTA) suspension 30 mL  30 mL Oral Q6H PRN Jermaine Perez MD       • atorvastatin (LIPITOR) tablet 40 mg  40 mg Oral Daily Jermaine Perez MD   40 mg at 03/19/17 0841   • AZITHROMYCIN 500 MG/250 ML 0.9% NS IVPB (vial-mate)  500 mg Intravenous Q24H Jermaine Perez MD       • budesonide-formoterol (SYMBICORT) 80-4.5 MCG/ACT inhaler 2 puff  2 puff Inhalation BID - RT Jermaine Perez MD   2 puff at 03/19/17 0821   • carvedilol (COREG) tablet 6.25 mg  6.25 mg Oral BID With Meals Jermaine Perez MD   6.25 mg at 03/19/17 0841   • [START ON 3/20/2017] cefTRIAXone (ROCEPHIN) 1 g/50 mL 0.9% NS VTB (mbp)  1 g Intravenous Q24H Jermaine Perez MD       • clopidogrel (PLAVIX) tablet 75 mg  75 mg Oral Daily Jermaine Perez MD   75 mg at 03/19/17 0841   • enoxaparin (LOVENOX) syringe 40 mg  40 mg Subcutaneous Daily Jermaine Perez MD   40 mg at 03/19/17 0841   • magnesium hydroxide (MILK OF MAGNESIA) suspension 2400 mg/10mL 10 mL  10 mL Oral Daily PRN Jermaine Perez MD       • methylPREDNISolone sodium succinate (SOLU-Medrol) injection 40 mg  40 mg Intravenous Q8H Jermaine Perez MD   40 mg at 03/19/17 1407   • sodium chloride 0.9 % flush 10 mL  10 mL Intravenous PRN Triage Protocol Emergency, MD       • sodium chloride 0.9 % infusion  100 mL/hr Intravenous Continuous Jermaine Perez  mL/hr at 03/19/17 1058 100 mL/hr at 03/19/17 1058   • tamsulosin (FLOMAX) 24 hr capsule 0.4 mg  0.4 mg Oral Daily Jermaine Perez MD   0.4 mg at 03/19/17 0803       Assessment/Plan     Principal Problem:    COPD with exacerbation  Active Problems:    Atherosclerotic heart disease of  native coronary artery without angina pectoris    Chronic kidney disease, stage III (moderate)    Steroid-dependent COPD    Degeneration of cervical intervertebral disc    GERD without esophagitis    Hiatal hernia    Hyperlipidemia    Hypertension    Chronic respiratory failure    Patient was admitted earlier in the morning patient was seen and evaluated later in the day again there is no significant changes he may feel slightly better no changes in management continue with the same plan.  Probably will need 3-5 days to get any better.  Does give a history of CPAP use in the past was seen by Dr. Lopez and has not used CPAP for about 5 years he's not sure at this point he can even try it again.  If there is no significant improvement will have to consult Dr. Lopez to see if he'll be able to help him in any way.  Details were discussed with the patient as well as family in the room.   I also discussed the details with the nursing staff.    Rest as ordered.    Markel Owens MD  03/19/17  3:10 PM    Please note that portions of this note may have been completed with a voice recognition program. Efforts were made to edit the dictations, but occasionally words are mistranscribed.

## 2017-03-19 NOTE — ED NOTES
2258: DR. PANCHAL PAGED PER DR. JUNIOR.   2259: DR. PANCHAL RETURNED CALL.  Olya Benjamin  03/18/17 2259       Olya Benjamin  03/18/17 1331

## 2017-03-19 NOTE — PROGRESS NOTES
Discharge Planning Assessment   [facility]     Patient Name: Thurman Mendel Parsons  MRN: [id number]  Today's Date: 3/19/2017    Admit Date: 3/18/2017          Discharge Needs Assessment       03/19/17 1018    Discharge Needs Assessment    Equipment Currently Used at Home oxygen            Discharge Plan       03/19/17 1017    Case Management/Social Work Plan    Plan to Return home at [place].  Lives alone, independent prior to admission. O2 continous 4L provided by [facility].  Denies  affiliation.     Patient/Family In Agreement With Plan yes        Discharge Placement     No information found                Demographic Summary     None            Functional Status       03/19/17 1016    Functional Status Prior    Ambulation 0-->independent    Transferring 0-->independent    Toileting 0-->independent    Bathing 0-->independent    Dressing 0-->independent    Eating 0-->independent    Communication 0-->understands/communicates without difficulty    Swallowing 0-->swallows foods/liquids without difficulty            Psychosocial     None            Abuse/Neglect     None            Legal     None            Substance Abuse     None            Patient Forms     None          April Mullins

## 2017-03-19 NOTE — ED PROVIDER NOTES
Subjective   History of Present Illness  TRIAGE CHIEF COMPLAINT:   Chief Complaint   Patient presents with   • Shortness of Breath         HPI: Thurman Mendel Parsons   is a 66 y.o. male   who presents to the emergency department complaining of shortness of breath.  Past medical history of COPD on home oxygen, coronary artery disease, hypertension, diabetes, CHF, CKD, PAF, active smoker.  Patient has been short of breath since earlier this morning.  Has used 3 nebulizer treatments today with only minimal relief.  Has had some associated cough productive of yellow sputum.  Review of systems positive for a few episodes of diarrhea.  Denies fever, chills, chest pain, palpitations, near-syncope, nausea, vomiting, abdominal pain.  No recent illness or trauma.  No known sick contacts.            Review of Systems   All other systems reviewed and are negative.      Past Medical History   Diagnosis Date   • Abnormal liver enzymes    • Acute bronchitis    • Anemia    • Anxiety    • Arthritis    • Atherosclerotic heart disease of native coronary artery without angina pectoris    • Atrial fibrillation    • Atypical chest pain    • Back pain    • BPH (benign prostatic hyperplasia)    • Cardiac pain    • Chronic bronchitis    • Chronic kidney disease    • COPD (chronic obstructive pulmonary disease)    • Degeneration of cervical intervertebral disc    • Depression    • Diabetes mellitus    • Diverticulosis    • Dyspnea    • Esophageal reflux    • Esophagitis    • Gastritis    • Hematuria    • Hemorrhoids    • Hiatal hernia    • History of arthritis    • History of myocardial infarction    • History of peripheral neuropathy    • History of ventricular septal defect    • History of ventricular septal defect    • Hyperlipidemia    • Hypertension    • Infectious diarrhea    • Infectious diarrhea    • Kyphosis, acquired    • Lumbar radiculopathy    • Mitral and aortic valve disease    • Myocardial infarction    • Nephrolithiasis    •  Phimosis    • Respiratory failure    • Sleep apnea    • Stroke syndrome    • Stroke syndrome    • Urinary calculus    • Urinary tract infection    • Ventral hernia        Allergies   Allergen Reactions   • Sulfa Antibiotics Other (See Comments), Nausea Only and Nausea And Vomiting       Past Surgical History   Procedure Laterality Date   • Hernia repair     • Suprapubic catheter insertion       History of Percutaneous Catheter Placement Into Ureter   • Throat surgery     • Vsd repair       History of VSD Repair By Patch Closure       Family History   Problem Relation Age of Onset   • Other Father      CABG   • Coronary artery disease Father        Social History     Social History   • Marital status:      Spouse name: N/A   • Number of children: N/A   • Years of education: N/A     Occupational History   •  Retired     Social History Main Topics   • Smoking status: Former Smoker   • Smokeless tobacco: None   • Alcohol use No   • Drug use: No   • Sexual activity: Defer     Other Topics Concern   • None     Social History Narrative           Objective   Physical Exam    CONSTITUTIONAL: Awake, oriented, appears older than stated age, pursed lip breathing, short of breath but non-toxic   HENT: Atraumatic, normocephalic, oral mucosa pink and moist, airway patent. Nares patent without drainage. External ears normal.   EYES: Conjunctiva clear, EOMI, PERRL   NECK: Trachea midline, non-tender, supple   CARDIOVASCULAR: Normal heart rate, Normal rhythm, No murmurs, rubs, gallops   PULMONARY/CHEST: poor air movement. Diffuse bilateral wheezing. Symmetrical breath sounds. Non-tender.   ABDOMINAL: Non-distended, soft, non-tender - no rebound or guarding. BS normal.   NEUROLOGIC: Non-focal, moving all four extremities, no gross sensory or motor deficits.   EXTREMITIES: No clubbing, cyanosis, or edema   SKIN: Warm, Dry, No erythema, No rash     XR Chest 1 View    (Results Pending)     CXR: Increased lung markings at the left  base, infiltrate versus scarring.      EKG:  NSR rate 92, RBBB       Procedures         ED Course  ED Course          ED COURSE / MEDICAL DECISION MAKING:   COPD exacerbation.  Improving with treatment.  Case discussed with Dr. Perez who will admit to the hospitalist service     DECISION TO DISCHARGE/ADMIT: see ED care timeline       Electronically signed by: Raheem Baez MD, 3/18/2017 11:06 PM              [facility]    Final diagnoses:   COPD with exacerbation            Raheem Baez MD  03/18/17 0725

## 2017-03-20 LAB
ALBUMIN SERPL-MCNC: 3.4 G/DL (ref 3.5–5)
ALBUMIN/GLOB SERPL: 1.5 G/DL (ref 1–2)
ALP SERPL-CCNC: 54 U/L (ref 38–126)
ALT SERPL W P-5'-P-CCNC: 23 U/L (ref 13–69)
ANION GAP SERPL CALCULATED.3IONS-SCNC: 10.2 MMOL/L
AST SERPL-CCNC: 23 U/L (ref 15–46)
BASOPHILS # BLD AUTO: 0.01 10*3/MM3 (ref 0–0.2)
BASOPHILS NFR BLD AUTO: 0.1 % (ref 0–2.5)
BILIRUB SERPL-MCNC: 0.6 MG/DL (ref 0.2–1.3)
BUN BLD-MCNC: 29 MG/DL (ref 7–20)
BUN/CREAT SERPL: 32.2 (ref 6.3–21.9)
CALCIUM SPEC-SCNC: 8.3 MG/DL (ref 8.4–10.2)
CHLORIDE SERPL-SCNC: 107 MMOL/L (ref 98–107)
CO2 SERPL-SCNC: 32 MMOL/L (ref 26–30)
CREAT BLD-MCNC: 0.9 MG/DL (ref 0.6–1.3)
DEPRECATED RDW RBC AUTO: 39.9 FL (ref 37–54)
EOSINOPHIL # BLD AUTO: 0 10*3/MM3 (ref 0–0.7)
EOSINOPHIL NFR BLD AUTO: 0 % (ref 0–7)
ERYTHROCYTE [DISTWIDTH] IN BLOOD BY AUTOMATED COUNT: 11.5 % (ref 11.5–14.5)
GFR SERPL CREATININE-BSD FRML MDRD: 84 ML/MIN/1.73
GLOBULIN UR ELPH-MCNC: 2.3 GM/DL
GLUCOSE BLD-MCNC: 132 MG/DL (ref 74–98)
GLUCOSE BLDC GLUCOMTR-MCNC: 114 MG/DL (ref 70–130)
GLUCOSE BLDC GLUCOMTR-MCNC: 122 MG/DL (ref 70–130)
GLUCOSE BLDC GLUCOMTR-MCNC: 336 MG/DL (ref 70–130)
HCT VFR BLD AUTO: 35.9 % (ref 42–52)
HGB BLD-MCNC: 11.4 G/DL (ref 14–18)
IMM GRANULOCYTES # BLD: 0.09 10*3/MM3 (ref 0–0.06)
IMM GRANULOCYTES NFR BLD: 0.8 % (ref 0–0.6)
LYMPHOCYTES # BLD AUTO: 0.62 10*3/MM3 (ref 0.6–3.4)
LYMPHOCYTES NFR BLD AUTO: 5.4 % (ref 10–50)
MCH RBC QN AUTO: 30.2 PG (ref 27–31)
MCHC RBC AUTO-ENTMCNC: 31.8 G/DL (ref 30–37)
MCV RBC AUTO: 95.2 FL (ref 80–94)
MONOCYTES # BLD AUTO: 0.46 10*3/MM3 (ref 0–0.9)
MONOCYTES NFR BLD AUTO: 4 % (ref 0–12)
NEUTROPHILS # BLD AUTO: 10.28 10*3/MM3 (ref 2–6.9)
NEUTROPHILS NFR BLD AUTO: 89.7 % (ref 37–80)
NRBC BLD MANUAL-RTO: 0 /100 WBC (ref 0–0)
PHOSPHATE SERPL-MCNC: 3.3 MG/DL (ref 2.5–4.5)
PLATELET # BLD AUTO: 152 10*3/MM3 (ref 130–400)
PMV BLD AUTO: 9.6 FL (ref 6–12)
POTASSIUM BLD-SCNC: 4.2 MMOL/L (ref 3.5–5.1)
PROT SERPL-MCNC: 5.7 G/DL (ref 6.3–8.2)
RBC # BLD AUTO: 3.77 10*6/MM3 (ref 4.7–6.1)
SODIUM BLD-SCNC: 145 MMOL/L (ref 137–145)
WBC NRBC COR # BLD: 11.46 10*3/MM3 (ref 4.8–10.8)

## 2017-03-20 PROCEDURE — 63710000001 INSULIN REGULAR HUMAN PER 5 UNITS: Performed by: INTERNAL MEDICINE

## 2017-03-20 PROCEDURE — 99225 PR SBSQ OBSERVATION CARE/DAY 25 MINUTES: CPT | Performed by: INTERNAL MEDICINE

## 2017-03-20 PROCEDURE — 25010000002 AZITHROMYCIN: Performed by: INTERNAL MEDICINE

## 2017-03-20 PROCEDURE — 94799 UNLISTED PULMONARY SVC/PX: CPT

## 2017-03-20 PROCEDURE — 84100 ASSAY OF PHOSPHORUS: CPT | Performed by: INTERNAL MEDICINE

## 2017-03-20 PROCEDURE — 80053 COMPREHEN METABOLIC PANEL: CPT | Performed by: INTERNAL MEDICINE

## 2017-03-20 PROCEDURE — G0378 HOSPITAL OBSERVATION PER HR: HCPCS

## 2017-03-20 PROCEDURE — 25010000002 CEFTRIAXONE: Performed by: INTERNAL MEDICINE

## 2017-03-20 PROCEDURE — 25010000002 ENOXAPARIN PER 10 MG: Performed by: INTERNAL MEDICINE

## 2017-03-20 PROCEDURE — 85025 COMPLETE CBC W/AUTO DIFF WBC: CPT | Performed by: INTERNAL MEDICINE

## 2017-03-20 PROCEDURE — 94660 CPAP INITIATION&MGMT: CPT

## 2017-03-20 PROCEDURE — 25010000002 METHYLPREDNISOLONE PER 40 MG: Performed by: INTERNAL MEDICINE

## 2017-03-20 PROCEDURE — 82962 GLUCOSE BLOOD TEST: CPT

## 2017-03-20 RX ORDER — ALPRAZOLAM 0.5 MG/1
0.5 TABLET ORAL ONCE
Status: COMPLETED | OUTPATIENT
Start: 2017-03-20 | End: 2017-03-20

## 2017-03-20 RX ORDER — ESCITALOPRAM OXALATE 10 MG/1
10 TABLET ORAL DAILY
Status: DISCONTINUED | OUTPATIENT
Start: 2017-03-20 | End: 2017-03-23 | Stop reason: HOSPADM

## 2017-03-20 RX ORDER — DEXTROSE MONOHYDRATE 25 G/50ML
25 INJECTION, SOLUTION INTRAVENOUS
Status: DISCONTINUED | OUTPATIENT
Start: 2017-03-20 | End: 2017-03-23 | Stop reason: HOSPADM

## 2017-03-20 RX ORDER — IPRATROPIUM BROMIDE AND ALBUTEROL SULFATE 2.5; .5 MG/3ML; MG/3ML
3 SOLUTION RESPIRATORY (INHALATION)
Status: DISCONTINUED | OUTPATIENT
Start: 2017-03-20 | End: 2017-03-23 | Stop reason: HOSPADM

## 2017-03-20 RX ADMIN — ENOXAPARIN SODIUM 40 MG: 40 INJECTION SUBCUTANEOUS at 09:48

## 2017-03-20 RX ADMIN — GUAIFENESIN 200 MG: 100 SOLUTION ORAL at 20:46

## 2017-03-20 RX ADMIN — ALPRAZOLAM 0.5 MG: 0.5 TABLET ORAL at 11:15

## 2017-03-20 RX ADMIN — TAMSULOSIN HYDROCHLORIDE 0.4 MG: 0.4 CAPSULE ORAL at 09:47

## 2017-03-20 RX ADMIN — ATORVASTATIN CALCIUM 40 MG: 40 TABLET, FILM COATED ORAL at 09:47

## 2017-03-20 RX ADMIN — HUMAN INSULIN 7 UNITS: 100 INJECTION, SOLUTION SUBCUTANEOUS at 17:29

## 2017-03-20 RX ADMIN — METHYLPREDNISOLONE SODIUM SUCCINATE 40 MG: 125 INJECTION, POWDER, FOR SOLUTION INTRAMUSCULAR; INTRAVENOUS at 05:18

## 2017-03-20 RX ADMIN — ALBUTEROL SULFATE 2.5 MG: 2.5 SOLUTION RESPIRATORY (INHALATION) at 05:11

## 2017-03-20 RX ADMIN — CEFTRIAXONE 1 G: 1 INJECTION, POWDER, FOR SOLUTION INTRAMUSCULAR; INTRAVENOUS at 01:07

## 2017-03-20 RX ADMIN — IPRATROPIUM BROMIDE AND ALBUTEROL SULFATE 3 ML: .5; 3 SOLUTION RESPIRATORY (INHALATION) at 16:04

## 2017-03-20 RX ADMIN — CARVEDILOL 6.25 MG: 6.25 TABLET, FILM COATED ORAL at 17:29

## 2017-03-20 RX ADMIN — CLOPIDOGREL BISULFATE 75 MG: 75 TABLET ORAL at 09:47

## 2017-03-20 RX ADMIN — AZITHROMYCIN 500 MG: 500 INJECTION, POWDER, LYOPHILIZED, FOR SOLUTION INTRAVENOUS at 23:00

## 2017-03-20 RX ADMIN — ESCITALOPRAM OXALATE 10 MG: 10 TABLET ORAL at 11:14

## 2017-03-20 RX ADMIN — AZITHROMYCIN 500 MG: 500 INJECTION, POWDER, LYOPHILIZED, FOR SOLUTION INTRAVENOUS at 21:55

## 2017-03-20 RX ADMIN — ACETAMINOPHEN 650 MG: 325 TABLET ORAL at 09:47

## 2017-03-20 RX ADMIN — ALPRAZOLAM 0.5 MG: 0.5 TABLET ORAL at 11:16

## 2017-03-20 RX ADMIN — CARVEDILOL 6.25 MG: 6.25 TABLET, FILM COATED ORAL at 09:47

## 2017-03-20 RX ADMIN — ALUMINUM HYDROXIDE, MAGNESIUM HYDROXIDE, AND SIMETHICONE 30 ML: 200; 200; 20 SUSPENSION ORAL at 20:44

## 2017-03-20 RX ADMIN — ALPRAZOLAM 0.5 MG: 0.5 TABLET ORAL at 21:55

## 2017-03-20 RX ADMIN — METHYLPREDNISOLONE SODIUM SUCCINATE 40 MG: 125 INJECTION, POWDER, FOR SOLUTION INTRAMUSCULAR; INTRAVENOUS at 21:55

## 2017-03-20 RX ADMIN — BUDESONIDE AND FORMOTEROL FUMARATE DIHYDRATE 2 PUFF: 80; 4.5 AEROSOL RESPIRATORY (INHALATION) at 07:29

## 2017-03-20 RX ADMIN — METHYLPREDNISOLONE SODIUM SUCCINATE 40 MG: 125 INJECTION, POWDER, FOR SOLUTION INTRAMUSCULAR; INTRAVENOUS at 14:55

## 2017-03-20 NOTE — PLAN OF CARE
Problem: Patient Care Overview (Adult)  Goal: Plan of Care Review  Outcome: Ongoing (interventions implemented as appropriate)    03/20/17 0340   Coping/Psychosocial Response Interventions   Plan Of Care Reviewed With patient   Patient Care Overview   Progress no change   Outcome Evaluation   Outcome Summary/Follow up Plan pt SOA at beginning of shift, pt states scheduled and prn neb tx seem to help a little bit, only wore bipap for about an hour tonight. Joao (family member) visited the pt tonight , several staff members told this nurse that Joao was acting aggressively toward the pt (yelling, trying to get money/keys from the pt), Joao left within a few minutes, after which the pt was tearful and anxious. this nurse and other staff members reassured the pt. pt seemed to sleep well tonight after Joao left. Placed note on door for visitors to see nursing staff before entering the pt's room.       Goal: Adult Individualization and Mutuality  Outcome: Ongoing (interventions implemented as appropriate)  Goal: Discharge Needs Assessment  Outcome: Ongoing (interventions implemented as appropriate)    Problem: COPD, Chronic Bronchitis/Emphysema (Adult)  Goal: Signs and Symptoms of Listed Potential Problems Will be Absent or Manageable (COPD, Chronic Bronchitis/Emphysema)  Outcome: Ongoing (interventions implemented as appropriate)

## 2017-03-20 NOTE — PROGRESS NOTES
[facility]    PROGRESS NOTE    Name:  Thurman Mendel Parsons   Age:  66 y.o.  Sex:  male  :  1950  MRN:  [id number]   Visit Number:  [id number]  Admission Date:  3/18/2017  Date Of Service:  17  Primary Care Physician:  Miguel Rojas MD     LOS: 0 days :  Patient Care Team:  Miguel Rojas MD as PCP - General  Miguel Rojas MD as PCP - Family Medicine:    Chief Complaint:      Weakness, SOA    Subjective / Interval History:     + SOA with notable wheezing an weakness.  Dr. Lopez has been consulted for his severe lung disease/copd exac. Patient with no f/c/s.  No n/v or abdominal pain. No chest pain or lower ext edema.     Vital Signs:    Temp:  [98.2 °F (36.8 °C)-98.4 °F (36.9 °C)] 98.4 °F (36.9 °C)  Heart Rate:  [69-88] 69  Resp:  [16-22] 17  BP: (117-154)/(55-79) 136/79    Intake and output:    Intake/Output       17 0700 - 17 0659    Intake (ml) 1020    Output (ml) 1700    Net (ml) -680    Last Weight 139 lb 6 oz (63.2 kg)          Physical Examination:    General Appearance:  Alert and cooperative, not in any acute distress.   Head:  Atraumatic and normocephalic, without obvious abnormality.   Eyes:      PERRLA, Extra-occular movements are within normal limits.   Lungs:   Diffuse expiratory wheezing.   Heart:  Normal S1 and S2, no murmur, no gallop, no rub.   Abdomen:   Normal bowel sounds,  Soft non-tender, non-distended, no guarding, no rebound tenderness   Extremities: No edema                     Laboratory results:      Results from last 7 days  Lab Units 17  0559 17  0547 17  2144   SODIUM mmol/L 145 143 144   POTASSIUM mmol/L 4.2 4.2 4.1   CHLORIDE mmol/L 107 103 99   TOTAL CO2 mmol/L 32.0* 36.0* 40.0*   BUN mg/dL 29* 27* 32*   CREATININE mg/dL 0.90 0.90 1.00   CALCIUM mg/dL 8.3* 8.7 9.2   BILIRUBIN mg/dL 0.6 0.9 1.0   ALK PHOS U/L 54 66 75   ALT (SGPT) U/L 23 28 28   AST (SGOT) U/L 23 27 31   GLUCOSE mg/dL 132* 143* 148*        Results from last 7 days  Lab Units 03/20/17  0559 03/19/17  0547 03/18/17  2144   WBC 10*3/mm3 11.46* 9.35 12.28*   HEMOGLOBIN g/dL 11.4* 12.4* 12.9*   HEMATOCRIT % 35.9* 38.6* 41.1*   PLATELETS 10*3/mm3 152 168 155       Results from last 7 days  Lab Units 03/18/17  2144   CK TOTAL U/L 61   TROPONIN I ng/mL 0.055*   CK MB INDEX % 5.2*           I have reviewed the patient's laboratory results.    Radiology results:    Imaging Results (last 24 hours)     ** No results found for the last 24 hours. **          I have reviewed the patient's radiology reports.    Medication Review:     I have reviewed the patients active and prn medications.       Assessment/Plan:  Principal Problem:  COPD with exacerbation    Active Problems:  Atherosclerotic heart disease of native coronary artery without angina pectoris  Chronic kidney disease, stage III (moderate)  Steroid-dependent COPD  Degeneration of cervical intervertebral disc  GERD without esophagitis  Hiatal hernia  Hyperlipidemia  Hypertension  Chronic respiratory failure    -Will continue with nebs q 4 hours, IV steroids and abx therapy.  Dr. Lopez, pulmonary following.  Possible DC in 2-3 days.     Edward Davis MD  03/20/17  2:32 PM

## 2017-03-20 NOTE — PROGRESS NOTES
Continued Stay Note  CARLOS Shane     Patient Name: Thurman Mendel Parsons  MRN: [id number]  Today's Date: 3/20/2017    Admit Date: 3/18/2017          Discharge Plan       03/20/17 1351    Case Management/Social Work Plan    Plan SW notified due to argument with son. Spoke to Monet and he agrees to see son for 5 minutes. Spoke to patient who states son just lost his aunt and was arrested on an old warrant when they brought him to the hospital. He is from [place] and will return soon. Patient states he can use home health when discharged as he lives alone normally.     Patient/Family In Agreement With Plan yes    Additional Comments Denies any problems with son.     Final Note    Final Note Would benefit from home health if appropriate.               Discharge Codes     None            Deana Jarvis

## 2017-03-21 LAB
ALBUMIN SERPL-MCNC: 3.2 G/DL (ref 3.5–5)
ALBUMIN/GLOB SERPL: 1.3 G/DL (ref 1–2)
ALP SERPL-CCNC: 56 U/L (ref 38–126)
ALT SERPL W P-5'-P-CCNC: 26 U/L (ref 13–69)
ANION GAP SERPL CALCULATED.3IONS-SCNC: 6.4 MMOL/L
ARTERIAL PATENCY WRIST A: POSITIVE
AST SERPL-CCNC: 27 U/L (ref 15–46)
ATMOSPHERIC PRESS: 734 MMHG
BASE EXCESS BLDA CALC-SCNC: 9.5 MMOL/L
BASOPHILS # BLD AUTO: 0.01 10*3/MM3 (ref 0–0.2)
BASOPHILS NFR BLD AUTO: 0.1 % (ref 0–2.5)
BDY SITE: ABNORMAL
BILIRUB SERPL-MCNC: 0.5 MG/DL (ref 0.2–1.3)
BUN BLD-MCNC: 31 MG/DL (ref 7–20)
BUN/CREAT SERPL: 34.4 (ref 6.3–21.9)
CALCIUM SPEC-SCNC: 8.4 MG/DL (ref 8.4–10.2)
CHLORIDE SERPL-SCNC: 105 MMOL/L (ref 98–107)
CO2 SERPL-SCNC: 35 MMOL/L (ref 26–30)
CREAT BLD-MCNC: 0.9 MG/DL (ref 0.6–1.3)
DEPRECATED RDW RBC AUTO: 40 FL (ref 37–54)
EOSINOPHIL # BLD AUTO: 0 10*3/MM3 (ref 0–0.7)
EOSINOPHIL NFR BLD AUTO: 0 % (ref 0–7)
ERYTHROCYTE [DISTWIDTH] IN BLOOD BY AUTOMATED COUNT: 11.5 % (ref 11.5–14.5)
GFR SERPL CREATININE-BSD FRML MDRD: 84 ML/MIN/1.73
GLOBULIN UR ELPH-MCNC: 2.4 GM/DL
GLUCOSE BLD-MCNC: 114 MG/DL (ref 74–98)
GLUCOSE BLDC GLUCOMTR-MCNC: 107 MG/DL (ref 70–130)
GLUCOSE BLDC GLUCOMTR-MCNC: 126 MG/DL (ref 70–130)
GLUCOSE BLDC GLUCOMTR-MCNC: 82 MG/DL (ref 70–130)
GLUCOSE BLDC GLUCOMTR-MCNC: 91 MG/DL (ref 70–130)
HCO3 BLDA-SCNC: 34.1 MMOL/L (ref 22–28)
HCT VFR BLD AUTO: 35.2 % (ref 42–52)
HGB BLD-MCNC: 11.5 G/DL (ref 14–18)
HGB BLDA-MCNC: 11.7 G/DL (ref 12–18)
HOROWITZ INDEX BLD+IHG-RTO: 36 %
IMM GRANULOCYTES # BLD: 0.04 10*3/MM3 (ref 0–0.06)
IMM GRANULOCYTES NFR BLD: 0.3 % (ref 0–0.6)
LYMPHOCYTES # BLD AUTO: 0.64 10*3/MM3 (ref 0.6–3.4)
LYMPHOCYTES NFR BLD AUTO: 5.5 % (ref 10–50)
MCH RBC QN AUTO: 30.9 PG (ref 27–31)
MCHC RBC AUTO-ENTMCNC: 32.7 G/DL (ref 30–37)
MCV RBC AUTO: 94.6 FL (ref 80–94)
MODALITY: ABNORMAL
MONOCYTES # BLD AUTO: 0.56 10*3/MM3 (ref 0–0.9)
MONOCYTES NFR BLD AUTO: 4.8 % (ref 0–12)
NEUTROPHILS # BLD AUTO: 10.48 10*3/MM3 (ref 2–6.9)
NEUTROPHILS NFR BLD AUTO: 89.3 % (ref 37–80)
NRBC BLD MANUAL-RTO: 0 /100 WBC (ref 0–0)
PCO2 BLDA: 53.5 MM HG (ref 35–45)
PH BLDA: 7.41 PH UNITS
PLATELET # BLD AUTO: 160 10*3/MM3 (ref 130–400)
PMV BLD AUTO: 9.6 FL (ref 6–12)
PO2 BLDA: 94.6 MM HG (ref 75–100)
POTASSIUM BLD-SCNC: 4.4 MMOL/L (ref 3.5–5.1)
PROT SERPL-MCNC: 5.6 G/DL (ref 6.3–8.2)
RBC # BLD AUTO: 3.72 10*6/MM3 (ref 4.7–6.1)
SAO2 % BLDCOA: 98.3 %
SODIUM BLD-SCNC: 142 MMOL/L (ref 137–145)
WBC NRBC COR # BLD: 11.73 10*3/MM3 (ref 4.8–10.8)

## 2017-03-21 PROCEDURE — 80053 COMPREHEN METABOLIC PANEL: CPT | Performed by: INTERNAL MEDICINE

## 2017-03-21 PROCEDURE — 85025 COMPLETE CBC W/AUTO DIFF WBC: CPT | Performed by: INTERNAL MEDICINE

## 2017-03-21 PROCEDURE — 97166 OT EVAL MOD COMPLEX 45 MIN: CPT

## 2017-03-21 PROCEDURE — 25010000002 METHYLPREDNISOLONE PER 40 MG: Performed by: INTERNAL MEDICINE

## 2017-03-21 PROCEDURE — 97162 PT EVAL MOD COMPLEX 30 MIN: CPT

## 2017-03-21 PROCEDURE — 82962 GLUCOSE BLOOD TEST: CPT

## 2017-03-21 PROCEDURE — 94799 UNLISTED PULMONARY SVC/PX: CPT

## 2017-03-21 PROCEDURE — 99232 SBSQ HOSP IP/OBS MODERATE 35: CPT | Performed by: INTERNAL MEDICINE

## 2017-03-21 PROCEDURE — 25010000002 AZITHROMYCIN: Performed by: INTERNAL MEDICINE

## 2017-03-21 PROCEDURE — 82805 BLOOD GASES W/O2 SATURATION: CPT

## 2017-03-21 PROCEDURE — 94762 N-INVAS EAR/PLS OXIMTRY CONT: CPT

## 2017-03-21 PROCEDURE — 36600 WITHDRAWAL OF ARTERIAL BLOOD: CPT

## 2017-03-21 PROCEDURE — 25010000002 ENOXAPARIN PER 10 MG: Performed by: INTERNAL MEDICINE

## 2017-03-21 PROCEDURE — 25010000002 CEFTRIAXONE: Performed by: INTERNAL MEDICINE

## 2017-03-21 RX ORDER — METHYLPREDNISOLONE SODIUM SUCCINATE 40 MG/ML
40 INJECTION, POWDER, LYOPHILIZED, FOR SOLUTION INTRAMUSCULAR; INTRAVENOUS EVERY 12 HOURS
Status: DISCONTINUED | OUTPATIENT
Start: 2017-03-21 | End: 2017-03-23 | Stop reason: HOSPADM

## 2017-03-21 RX ADMIN — CARVEDILOL 6.25 MG: 6.25 TABLET, FILM COATED ORAL at 18:12

## 2017-03-21 RX ADMIN — ESCITALOPRAM OXALATE 10 MG: 10 TABLET ORAL at 08:54

## 2017-03-21 RX ADMIN — BUDESONIDE AND FORMOTEROL FUMARATE DIHYDRATE 2 PUFF: 80; 4.5 AEROSOL RESPIRATORY (INHALATION) at 20:31

## 2017-03-21 RX ADMIN — IPRATROPIUM BROMIDE AND ALBUTEROL SULFATE 3 ML: .5; 3 SOLUTION RESPIRATORY (INHALATION) at 20:25

## 2017-03-21 RX ADMIN — ENOXAPARIN SODIUM 40 MG: 40 INJECTION SUBCUTANEOUS at 08:54

## 2017-03-21 RX ADMIN — METHYLPREDNISOLONE SODIUM SUCCINATE 40 MG: 125 INJECTION, POWDER, FOR SOLUTION INTRAMUSCULAR; INTRAVENOUS at 05:00

## 2017-03-21 RX ADMIN — METHYLPREDNISOLONE SODIUM SUCCINATE 40 MG: 40 INJECTION, POWDER, FOR SOLUTION INTRAMUSCULAR; INTRAVENOUS at 18:12

## 2017-03-21 RX ADMIN — TAMSULOSIN HYDROCHLORIDE 0.4 MG: 0.4 CAPSULE ORAL at 08:54

## 2017-03-21 RX ADMIN — CARVEDILOL 6.25 MG: 6.25 TABLET, FILM COATED ORAL at 08:54

## 2017-03-21 RX ADMIN — BUDESONIDE AND FORMOTEROL FUMARATE DIHYDRATE 2 PUFF: 80; 4.5 AEROSOL RESPIRATORY (INHALATION) at 08:28

## 2017-03-21 RX ADMIN — IPRATROPIUM BROMIDE AND ALBUTEROL SULFATE 3 ML: .5; 3 SOLUTION RESPIRATORY (INHALATION) at 08:20

## 2017-03-21 RX ADMIN — METHYLPREDNISOLONE SODIUM SUCCINATE 40 MG: 125 INJECTION, POWDER, FOR SOLUTION INTRAMUSCULAR; INTRAVENOUS at 06:00

## 2017-03-21 RX ADMIN — IPRATROPIUM BROMIDE AND ALBUTEROL SULFATE 3 ML: .5; 3 SOLUTION RESPIRATORY (INHALATION) at 16:01

## 2017-03-21 RX ADMIN — CEFTRIAXONE 1 G: 1 INJECTION, POWDER, FOR SOLUTION INTRAMUSCULAR; INTRAVENOUS at 00:04

## 2017-03-21 RX ADMIN — ATORVASTATIN CALCIUM 40 MG: 40 TABLET, FILM COATED ORAL at 08:54

## 2017-03-21 RX ADMIN — CLOPIDOGREL BISULFATE 75 MG: 75 TABLET ORAL at 08:54

## 2017-03-21 RX ADMIN — AZITHROMYCIN 500 MG: 500 INJECTION, POWDER, LYOPHILIZED, FOR SOLUTION INTRAVENOUS at 22:27

## 2017-03-21 RX ADMIN — ACETAMINOPHEN 650 MG: 325 TABLET ORAL at 14:49

## 2017-03-21 RX ADMIN — GUAIFENESIN 200 MG: 100 SOLUTION ORAL at 10:21

## 2017-03-21 NOTE — PLAN OF CARE
Problem: Patient Care Overview (Adult)  Goal: Plan of Care Review  Outcome: Ongoing (interventions implemented as appropriate)    03/21/17 1857   Coping/Psychosocial Response Interventions   Plan Of Care Reviewed With patient;mother   Outcome Evaluation   Outcome Summary/Follow up Plan Patient participates well in skilled PT evaluation and is exepected to benefit from additional PT while hospitalized and upon discharge to inpatient rehab facility. He has decreased strength, activity tolerance, balance, righting reactions, and gait quality.          Problem: Inpatient Physical Therapy  Goal: Bed Mobility Goal LTG- PT  Outcome: Ongoing (interventions implemented as appropriate)    03/21/17 1857   Bed Mobility PT LTG   Bed Mobility PT LTG, Date Established 03/21/17   Bed Mobility PT LTG, Time to Achieve 1 wk   Bed Mobility PT LTG, Activity Type all bed mobility   Bed Mobility PT LTG, [place] Level conditional independence   Bed Mobility PT Goal LTG, Assist Device bed rails   Bed Mobility PT LTG, Date Goal Reviewed 03/21/17   Bed Mobility PT LTG, Outcome goal ongoing       Goal: Transfer Training Goal 1 LTG- PT  Outcome: Ongoing (interventions implemented as appropriate)    03/21/17 1857   Transfer Training PT LTG   Transfer Training PT LTG, Date Established 03/21/17   Transfer Training PT LTG, Time to Achieve 1 wk   Transfer Training PT LTG, Activity Type bed to chair /chair to bed;sit to stand/stand to sit   Transfer Training PT LTG, [place] Level conditional independence   Transfer Training PT LTG, Assist Device walker, rolling   Transfer Training PT LTG, Date Goal Reviewed 03/21/17   Transfer Training PT LTG, Outcome goal ongoing       Goal: Gait Training Goal LTG- PT  Outcome: Ongoing (interventions implemented as appropriate)    03/21/17 1857   Gait Training PT LTG   Gait Training Goal PT LTG, Date Established 03/21/17   Gait Training Goal PT LTG, Time to Achieve 1 wk   Gait Training Goal PT LTG,  [place] Level conditional independence   Gait Training Goal PT LTG, Assist Device walker, rolling   Gait Training Goal PT LTG, Distance to Achieve 250   Gait Training Goal PT LTG, Date Goal Reviewed 03/21/17   Gait Training Goal PT LTG, Outcome goal ongoing

## 2017-03-21 NOTE — PLAN OF CARE
Problem: Patient Care Overview (Adult)  Goal: Adult Individualization and Mutuality  Outcome: Ongoing (interventions implemented as appropriate)    03/21/17 7482   Mutuality/Individual Preferences   What Information Would Help Us Give You More Personalized Care? SOA with exertion, frequent productive yellowish-white cough, prn cough med given, refused bipap this shift, pt was anxious at one point during the shift, prn xanax given, listened/talked to pt until pt calmed down, no further incidences of anxiety noted so far this shift, pt slept well last night       Goal: Discharge Needs Assessment  Outcome: Ongoing (interventions implemented as appropriate)    Problem: COPD, Chronic Bronchitis/Emphysema (Adult)  Goal: Signs and Symptoms of Listed Potential Problems Will be Absent or Manageable (COPD, Chronic Bronchitis/Emphysema)  Outcome: Ongoing (interventions implemented as appropriate)

## 2017-03-21 NOTE — PLAN OF CARE
Problem: Patient Care Overview (Adult)  Goal: Discharge Needs Assessment  Outcome: Ongoing (interventions implemented as appropriate)    03/19/17 0042 03/21/17 1520 03/21/17 1638   Discharge Needs Assessment   Concerns To Be Addressed no discharge needs identified --  --    Readmission Within The Last 30 Days no previous admission in last 30 days --  --    Discharge Facility/Level Of Care Needs --  --  acute rehab   Living Environment   Transportation Available --  car;family or friend will provide --    Self-Care   Equipment Currently Used at Home --  oxygen;shower chair --

## 2017-03-21 NOTE — PROGRESS NOTES
Acute Care - Occupational Therapy Initial Evaluation   [place]     Patient Name: Thurman Mendel Parsons  : 1950  MRN: [id number]  Today's Date: 3/21/2017  Onset of Illness/Injury or Date of Surgery Date: 17  Date of Referral to OT: 17  Referring Physician: Dr Davis    Admit Date: 3/18/2017       ICD-10-CM ICD-9-CM   1. COPD with exacerbation J44.1 491.21   2. Impaired mobility and ADLs Z74.09 799.89     Patient Active Problem List   Diagnosis   • Anxiety   • Atherosclerotic heart disease of native coronary artery without angina pectoris   • Atrial fibrillation   • Chronic bronchitis   • Chronic kidney disease, stage III (moderate)   • Steroid-dependent COPD   • Degeneration of cervical intervertebral disc   • Diabetes mellitus   • GERD without esophagitis   • Diverticulosis   • Hemorrhoids   • Hiatal hernia   • Hyperlipidemia   • Hypertension   • Kyphosis, acquired   • Mitral and aortic valve disease   • Phimosis   • Sleep apnea   • Enlarged prostate without lower urinary tract symptoms (luts)   • History of arthritis   • Back pain   • Cardiac pain   • Depression   • Hematuria   • Chronic respiratory failure   • Stroke syndrome   • History of ventricular septal defect   • COPD with exacerbation     Past Medical History   Diagnosis Date   • Abnormal liver enzymes    • Acute bronchitis    • Anemia    • Anxiety    • Arthritis    • Atherosclerotic heart disease of native coronary artery without angina pectoris    • Atrial fibrillation    • Atypical chest pain    • Back pain    • BPH (benign prostatic hyperplasia)    • Cardiac pain    • Chronic bronchitis    • Chronic kidney disease    • COPD (chronic obstructive pulmonary disease)    • Degeneration of cervical intervertebral disc    • Depression    • Diabetes mellitus    • Diverticulosis    • Dyspnea    • Esophageal reflux    • Esophagitis    • Gastritis    • Hematuria    • Hemorrhoids    • Hiatal hernia    • History of arthritis    • History of  myocardial infarction    • History of peripheral neuropathy    • History of ventricular septal defect    • History of ventricular septal defect    • Hyperlipidemia    • Hypertension    • Infectious diarrhea    • Infectious diarrhea    • Kyphosis, acquired    • Lumbar radiculopathy    • Mitral and aortic valve disease    • Myocardial infarction    • Nephrolithiasis    • Phimosis    • Respiratory failure    • Sleep apnea    • Stroke syndrome    • Stroke syndrome    • Urinary calculus    • Urinary tract infection    • Ventral hernia      Past Surgical History   Procedure Laterality Date   • Hernia repair     • Suprapubic catheter insertion       History of Percutaneous Catheter Placement Into Ureter   • Throat surgery     • Vsd repair       History of VSD Repair By Patch Closure          OT ASSESSMENT FLOWSHEET (last 72 hours)      OT Evaluation       03/21/17 1520 03/19/17 1018 03/19/17 1016 03/19/17 0023       Rehab Evaluation    Document Type evaluation  -SG        Subjective Information agree to therapy;complains of;pain  -SG        Patient Effort, Rehab Treatment adequate  -SG        Symptoms Noted During/After Treatment shortness of breath  -SG        Symptoms Noted Comment --   after walking rated breathing exertion 7/10  -SG        General Information    Patient Profile Review yes  -SG        Onset of Illness/Injury or Date of Surgery Date 03/18/17  -SG        Referring Physician Dr Davis  -SG        General Observations Patient received supine in bed on 4L O2 per NC, mother present.  -SG        Pertinent History Of Current Problem athersclerotic heart disease, CKD stage 3, steroid dependent COPD, degeneration of cervical intervertebral disc, GERD, hiatial hernia, hyperlipidenia, HTN, chronic resp failure  -SG        Precautions/Limitations oxygen therapy device and L/min  -SG        Prior Level of Function independent:;all household mobility;community mobility;ADL's;home management;cooking;driving  -SG         Equipment Currently Used at Home oxygen;shower chair  -SG oxygen  -LS  oxygen  -VL     Plans/Goals Discussed With patient and family;agreed upon  -SG        Risks Reviewed patient and family:;increased discomfort  -SG        Benefits Reviewed patient and family:;improve function;increase independence;increase strength  -SG        Barriers to Rehab none identified  -SG        Living Environment    Lives With alone  -SG   alone  -VL     Living Arrangements house  -SG   house  -VL     Home Accessibility stairs to enter home;stairs (1 railing present)  -SG   no concerns  -VL     Number of Stairs to Enter Home 2  -SG        Stair Railings at Home outside, present on right side  -SG   none  -VL     Type of Financial/Environmental Concern none  -SG   none  -VL     Transportation Available car;family or friend will provide  -SG   car  -VL     Clinical Impression    Date of Referral to OT 03/21/17  -SG        OT Diagnosis weakness, decreased ADL independence  -SG        Prognosis good  -SG        Patient/Family Goals Statement Return home independently.  -SG        Criteria for Skilled Therapeutic Interventions Met yes  -SG        Rehab Potential good, to achieve stated therapy goals  -SG        Therapy Frequency 3-5 times/wk  -SG        Anticipated Discharge Disposition inpatient rehabilitation facility  -SG        Functional Level Prior    Ambulation 0-->independent  -SG  0-->independent  -LS 0-->independent  -VL     Transferring 0-->independent  -SG  0-->independent  -LS 0-->independent  -VL     Toileting 0-->independent  -SG  0-->independent  -LS 0-->independent  -VL     Bathing 0-->independent  -SG  0-->independent  -LS 0-->independent  -VL     Dressing 0-->independent  -SG  0-->independent  -LS 0-->independent  -VL     Eating 0-->independent  -SG  0-->independent  -LS 0-->independent  -VL     Communication 0-->understands/communicates without difficulty  -SG  0-->understands/communicates without difficulty  -LS  0-->understands/communicates without difficulty  -VL     Swallowing 0-->swallows foods/liquids without difficulty  -SG  0-->swallows foods/liquids without difficulty  -LS 0-->swallows foods/liquids without difficulty  -VL     Vital Signs    Pre SpO2 (%) 94  -SG        O2 Delivery Pre Treatment supplemental O2   4l  -SG        Intra SpO2 (%) 96  -SG        O2 Delivery Intra Treatment supplemental O2   4l  -SG        Post SpO2 (%) 94  -SG        O2 Delivery Post Treatment supplemental O2   4l  -SG        Rest Breaks  --   frequent restbreaks required  -SG        Pain Assessment    Pain Assessment 0-10  -SG        Pain Score 5  -SG        Post Pain Score 5  -SG        Pain Type Acute pain  -SG        Pain Location Head  -SG        Response to Interventions --   nursing already provided Tylenol  -SG        Vision Assessment/Intervention    Visual Impairment WFL with corrective lenses  -        Cognitive Assessment/Intervention    Current Cognitive/Communication Assessment functional  -SG        Orientation Status oriented x 4  -SG        ROM (Range of Motion)    General ROM no range of motion deficits identified  -SG        MMT (Manual Muscle Testing)    General MMT Assessment upper extremity strength deficits identified  -        Upper Extremity    Upper Ext Manual Muscle Testing left shoulder strength deficit;right shoulder strength deficit   bilateral shoulders 3+/5, bilateral elbows 4-/5  -SG        Bed Mobility, Assessment/Treatment    Bed Mob, Supine to Sit, [place] contact guard assist  -SG        Bed Mob, Sit to Supine, [place] contact guard assist  -SG        Transfer Assessment/Treatment    Transfers, Stand-Sit [place] contact guard assist  -SG        Functional Mobility    Functional Mobility- Ind. Level minimum assist (75% patient effort)  -SG        Functional Mobility-Distance (Feet) 80   Patient able to walk 80 feet x 4 trials with CGA using gait  -        Functional Mobility- Safety  Issues balance decreased during turns;step length decreased;weight-shifting ability decreased   decreased balance and LOB with min A to regain balance  -        ADL Assessment/Intervention    Additional Documentation --   required extra time and frequent restbreaks during ADL   -SG        Upper Body Bathing Assessment/Training    UB Bathing Assess/Train, Position sitting  -SG        UB Bathing Assess/Train, [place] Level stand by assist  -        Lower Body Bathing Assessment/Training    LB Bathing Assess/Train, Position sitting  -SG        LB Bathing Assess/Train, [place] Level minimum assist (75% patient effort)  -SG        Upper Body Dressing Assessment/Training    UB Dressing Assess/Train, Position sitting  -SG        UB Dressing Assess/Train, [place] minimum assist (75% patient effort)  -SG        Lower Body Dressing Assessment/Training    LB Dressing Assess/Train, Position sitting  -SG        LB Dressing Assess/Train, [place] minimum assist (75% patient effort)  -        Toileting Assessment/Training    Toileting Assess/Train, Position sitting  -SG        Toileting Assess/Train, Indep[place] Level minimum assist (75% patient effort)  -SG        Grooming Assessment/Training    Grooming Assess/Train, Position standing  -SG        Grooming Assess/Train, Indepen Level minimum assist (75% patient effort)  -        General Therapy Interventions    Planned Therapy Interventions activity intolerance;ADL retraining;balance training;energy conservation;strengthening;transfer training  -        Positioning and Restraints    Pre-Treatment Position in bed  -        Post Treatment Position bed  -SG        In Bed supine;call light within reach;encouraged to call for assist;with family/caregiver  -          User Key  (r) = Recorded By, (t) = Taken By, (c) = Cosigned By    Initials Name Effective Dates     Klaudia Rodrigues 10/26/16 -     LS April Mullins 10/26/16 -     VL Zaida Childress RN  10/28/16 -            Occupational Therapy Education     Title: PT OT SLP Therapies (Active)     Topic: Occupational Therapy (Active)     Point: ADL training (Done)    Description: Instruct learner(s) on proper safety adaptation and remediation techniques during self care or transfers.   Instruct in proper use of assistive devices.    Learning Progress Summary    Learner Readiness Method Response Comment Documented by Status   Patient Acceptance E VU Patient and mother educated on role of OT and benefits of rehab vs home with home health and 24 hour care.  03/21/17 1646 Done   Family Acceptance E VU Patient and mother educated on role of OT and benefits of rehab vs home with home health and 24 hour care.  03/21/17 1646 Done                      User Key     Initials Effective Dates Name Provider Type Discipline     10/26/16 -  Klaudia Rodrigues Occupational Therapist OT                  OT Recommendation and Plan  Anticipated Discharge Disposition: inpatient rehabilitation facility  Planned Therapy Interventions: activity intolerance, ADL retraining, balance training, energy conservation, strengthening, transfer training  Therapy Frequency: 3-5 times/wk  Plan of Care Review  Plan Of Care Reviewed With: patient, mother  Progress:  (OT evaluation completed today.)  Outcome Summary/Follow up Plan: Patient received supine in bed with mother present, on 4L O2. Patient agreeable to participate with OT. Patient required frequent restbreaks during LB dressing.  Patient able to walk 80 feet with min A  d/t decreased balance time 4 trials with standing restbreaks on 6L O2, no device.Upon return to bed patient rated breathing on exertional scale 7/10 on 4l O2 per NC. Discussed benefits of discharge to rehab facility to increase strength, balance, and independence with ADL's vs home with home health and 24 hour assistance. Patient plans to discuss with family.          OT Goals       03/21/17 6065          Strength OT LTG     Strength Goal OT LTG, Date Established 03/21/17  -SG      Strength Goal OT LTG, Time to Achieve 2 wks  -SG      Strength Goal OT LTG, Measure to Achieve Patient will engage in BUE strengthening exercises 1x10 to improve functional strength for ADL's.  -SG      Strength Goal OT LTG, Outcome goal ongoing  -SG      Dynamic Standing Balance OT LTG    Dynamic Standing Balance OT LTG, Date Established 03/21/17  -SG      Dynamic Standing Balance OT LTG, Time to Achieve 2 wks  -SG      Dynamic Standing Balance OT LTG, [place] Level contact guard assist  -SG      Dynamic Standing Balance OT LTG, Additional Goal Patient will engage in dynamic standing balance activities with CGA to increase balance and safety for ADL's.  -SG      Dynamic Standing Balance OT LTG, Outcome goal ongoing  -SG      UB Dressing OT LTG    UB Dressing Goal OT LTG, Date Established 03/21/17  -SG      UB Dressing Goal OT LTG, Time to Achieve 2 wks  -SG      UB Dressing Goal OT LTG, [place] Level supervision required  -SG      UB Dressing Goal OT LTG, Outcome goal ongoing  -SG      Activity Tolerance OT LTG    Activity Tolerance Goal OT LTG, Date Established 03/21/17  -SG      Activity Tolerance Goal OT LTG, Time to Achieve 2 wks  -SG      Activity Tolerance Goal OT LTG, Activity Level 20 min activity  -SG      Activity Tolerance Goal OT LTG, Additional Goal Patient will be able to tolerate 20 min of ther ex/ther act.  -SG      Activity Tolerance Goal OT LTG, Outcome goal ongoing  -SG        User Key  (r) = Recorded By, (t) = Taken By, (c) = Cosigned By    Initials Name Provider Type    JOE Rodrigues Occupational Therapist                Outcome Measures       03/21/17 1520          How much help from another is currently needed...    Putting on and taking off regular lower body clothing? 3  -SG      Bathing (including washing, rinsing, and drying) 3  -SG      Toileting (which includes using toilet bed pan or urinal) 3  -SG       Putting on and taking off regular upper body clothing 3  -SG      Taking care of personal grooming (such as brushing teeth) 3  -SG      Eating meals 4  -SG      Score 19  -SG      Functional Assessment    Outcome Measure Options AM-PAC 6 Clicks Daily Activity (OT)  -SG        User Key  (r) = Recorded By, (t) = Taken By, (c) = Cosigned By    Initials Name Provider Type    JOE Rodrigues Occupational Therapist          Time Calculation:   OT Start Time: 1520    Therapy Charges for Today     Code Description Service Date Service Provider Modifiers Qty    43719154639 HC OT EVAL MOD COMPLEXITY 4 3/21/2017 Klaudia Rodrigues GO 1               Klaudia Rodrigues  3/21/2017

## 2017-03-21 NOTE — NURSING NOTE
GALINDO teaching for COPD instructed on what it is, causes, signs and symptoms and treatment with teachback

## 2017-03-21 NOTE — PROGRESS NOTES
[facility]    Name:  Thurman Mendel Parsons   Age:  66 y.o.  Sex:  male  :  1950  MRN:  [id number]   Visit Number:  [id number]  Admission Date:  3/18/2017  Date Of Service:  17  Primary Care Physician:  Miguel Rojas MD     LOS: 0 days :  Patient Care Team:  Miguel Rojas MD as PCP - General  Miguel Rojas MD as PCP - Family Medicine:    Chief Complaint:      SOA    Subjective / Interval History:     Patients SOA is steadily improving but still with significant soa and expiratory wheezing. He lives alone and reports feeling so fatigue that he can barely get to the chair near his bed.  No chest pain, n/v or abdominal pain.   has been consulted for evaluation of rehabilitation needs.     Vital Signs:    Temp:  [98.1 °F (36.7 °C)-98.9 °F (37.2 °C)] 98.1 °F (36.7 °C)  Heart Rate:  [62-79] 64  Resp:  [16-18] 18  BP: (119-138)/(72-85) 119/78    Intake and output:    Intake/Output       17 0700 - 17 0659 17 0700 - 17 0659    Intake (ml) 550 240    Output (ml) 1100 --    Net (ml) -550 240    Last Weight 143 lb 3 oz (64.9 kg) --          Physical Examination:    General Appearance:  Alert and cooperative, not in any acute distress.   Head:  Atraumatic and normocephalic, without obvious abnormality.   Eyes:      PERRLA, Extra-occular movements are within normal limits.   Lungs:   Diffuse expiratory wheezing   Heart:  Normal S1 and S2, no murmur, no gallop, no rub.   Abdomen:   Normal bowel sounds,  Soft non-tender, non-distended, no guarding, no rebound tenderness   Extremities: No edema                     Laboratory results:      Results from last 7 days  Lab Units 17  0613 17  0559 17  0547   SODIUM mmol/L 142 145 143   POTASSIUM mmol/L 4.4 4.2 4.2   CHLORIDE mmol/L 105 107 103   TOTAL CO2 mmol/L 35.0* 32.0* 36.0*   BUN mg/dL 31* 29* 27*   CREATININE mg/dL 0.90 0.90 0.90   CALCIUM mg/dL 8.4 8.3* 8.7    BILIRUBIN mg/dL 0.5 0.6 0.9   ALK PHOS U/L 56 54 66   ALT (SGPT) U/L 26 23 28   AST (SGOT) U/L 27 23 27   GLUCOSE mg/dL 114* 132* 143*       Results from last 7 days  Lab Units 03/21/17  0613 03/20/17  0559 03/19/17  0547   WBC 10*3/mm3 11.73* 11.46* 9.35   HEMOGLOBIN g/dL 11.5* 11.4* 12.4*   HEMATOCRIT % 35.2* 35.9* 38.6*   PLATELETS 10*3/mm3 160 152 168       Results from last 7 days  Lab Units 03/18/17  2144   CK TOTAL U/L 61   TROPONIN I ng/mL 0.055*   CK MB INDEX % 5.2*           I have reviewed the patient's laboratory results.    Radiology results:    Imaging Results (last 24 hours)     ** No results found for the last 24 hours. **          I have reviewed the patient's radiology reports.    Medication Review:     I have reviewed the patients active and prn medications.       Assessment/Plan:    Principal Problem:  COPD with exacerbation     Active Problems:  Atherosclerotic heart disease of native coronary artery without angina pectoris  Chronic kidney disease, stage III (moderate)  Steroid-dependent COPD  Degeneration of cervical intervertebral disc  GERD without esophagitis  Hiatal hernia  Hyperlipidemia  Hypertension  Chronic respiratory failure     -Patients clinical symptoms and ABG slowly improving. Will continue with nebs q 4 hours, titrate down IV steroids and continue with IV Azithro/Ceftriaxone. Flu screen negative. Dr. Lopez, pulmonary consulted. Rehab bed is pending. Patient is chronically on po steroids.     Edward Davis MD  03/21/17  12:40 PM

## 2017-03-21 NOTE — PROGRESS NOTES
Acute Care - Physical Therapy Initial Evaluation   [facility]     Patient Name: Thurman Mendel Parsons  : 1950  MRN: [id number]  Today's Date: 3/21/2017   Onset of Illness/Injury or Date of Surgery Date: 17  Date of Referral to PT: 17  Referring Physician: Dr Davis      Admit Date: 3/18/2017     Visit Dx:    ICD-10-CM ICD-9-CM   1. COPD with exacerbation J44.1 491.21   2. Impaired mobility and ADLs Z74.09 799.89   3. Impaired functional mobility, balance, gait, and endurance Z74.09 V49.89     Patient Active Problem List   Diagnosis   • Anxiety   • Atherosclerotic heart disease of native coronary artery without angina pectoris   • Atrial fibrillation   • Chronic bronchitis   • Chronic kidney disease, stage III (moderate)   • Steroid-dependent COPD   • Degeneration of cervical intervertebral disc   • Diabetes mellitus   • GERD without esophagitis   • Diverticulosis   • Hemorrhoids   • Hiatal hernia   • Hyperlipidemia   • Hypertension   • Kyphosis, acquired   • Mitral and aortic valve disease   • Phimosis   • Sleep apnea   • Enlarged prostate without lower urinary tract symptoms (luts)   • History of arthritis   • Back pain   • Cardiac pain   • Depression   • Hematuria   • Chronic respiratory failure   • Stroke syndrome   • History of ventricular septal defect   • COPD with exacerbation     Past Medical History   Diagnosis Date   • Abnormal liver enzymes    • Acute bronchitis    • Anemia    • Anxiety    • Arthritis    • Atherosclerotic heart disease of native coronary artery without angina pectoris    • Atrial fibrillation    • Atypical chest pain    • Back pain    • BPH (benign prostatic hyperplasia)    • Cardiac pain    • Chronic bronchitis    • Chronic kidney disease    • COPD (chronic obstructive pulmonary disease)    • Degeneration of cervical intervertebral disc    • Depression    • Diabetes mellitus    • Diverticulosis    • Dyspnea    • Esophageal reflux    • Esophagitis    • Gastritis    •  Hematuria    • Hemorrhoids    • Hiatal hernia    • History of arthritis    • History of myocardial infarction    • History of peripheral neuropathy    • History of ventricular septal defect    • History of ventricular septal defect    • Hyperlipidemia    • Hypertension    • Infectious diarrhea    • Infectious diarrhea    • Kyphosis, acquired    • Lumbar radiculopathy    • Mitral and aortic valve disease    • Myocardial infarction    • Nephrolithiasis    • Phimosis    • Respiratory failure    • Sleep apnea    • Stroke syndrome    • Stroke syndrome    • Urinary calculus    • Urinary tract infection    • Ventral hernia      Past Surgical History   Procedure Laterality Date   • Hernia repair     • Suprapubic catheter insertion       History of Percutaneous Catheter Placement Into Ureter   • Throat surgery     • Vsd repair       History of VSD Repair By Patch Closure          PT ASSESSMENT (last 72 hours)      PT Evaluation       03/21/17 1520 03/21/17 1518    Rehab Evaluation    Document Type evaluation  -SG evaluation  -JR    Subjective Information agree to therapy;complains of;pain  -SG agree to therapy;complains of;pain  -JR    Patient Effort, Rehab Treatment adequate  -SG adequate  -JR    Patient Effort, Rehab Treatment Comment  has a headache but agrees to try  -JR    Symptoms Noted During/After Treatment shortness of breath  -SG shortness of breath  -JR    Symptoms Noted Comment --   after walking rated breathing exertion 7/10  -SG Rated exertion of breathing=7/10  -JR    General Information    Patient Profile Review yes  -SG yes  -JR    Onset of Illness/Injury or Date of Surgery Date 03/18/17  -SG 03/18/17  -JR    Referring Physician Dr Davis  -SG Edward Davis MD  -JR    General Observations Patient received supine in bed on 4L O2 per NC, mother present.  -SG Supine,4 LPM oxygen per nasal cannula, mother at bedside  -JR    Pertinent History Of Current Problem athersclerotic heart disease, CKD stage 3, steroid  dependent COPD, degeneration of cervical intervertebral disc, GERD, hiatial hernia, hyperlipidenia, HTN, chronic resp failure  -SG COPD with exac, chronic bronchitis, CKD III, HTN  -JR    Precautions/Limitations oxygen therapy device and L/min  -SG oxygen therapy device and L/min   4-5 LPM per nasal cannula  -JR    Prior Level of Function independent:;all household mobility;community mobility;ADL's;home management;cooking;driving  -SG independent:;all household mobility  -JR    Equipment Currently Used at Home oxygen;shower chair  -SG oxygen   4-5 LPM per nasal cannula  -JR    Plans/Goals Discussed With patient and family;agreed upon  -SG patient and family;agreed upon  -JR    Risks Reviewed patient and family:;increased discomfort  -SG patient and family:;increased discomfort  -JR    Benefits Reviewed patient and family:;improve function;increase independence;increase strength  -SG patient and family:;increase strength;increase balance;increase independence;improve function  -JR    Barriers to Rehab none identified  -SG medically complex  -JR    Living Environment    Lives With alone  -SG alone  -JR    Living Arrangements house  -SG house  -JR    Home Accessibility stairs to enter home;stairs (1 railing present)  -SG stairs to enter home  -JR    Number of Stairs to Enter Home 2  -SG 2  -JR    Stair Railings at Home outside, present on right side  -SG outside, present on right side  -JR    Type of Financial/Environmental Concern none  -SG     Transportation Available car;family or friend will provide  -SG     Clinical Impression    Date of Referral to PT  03/21/17  -JR    PT Diagnosis  weakness, poor balance, gait abnormality  -JR    Prognosis  Good  -JR    Patient/Family Goals Statement  Patient wants to go home  -JR    Criteria for Skilled Therapeutic Interventions Met  yes;treatment indicated  -JR    Pathology/Pathophysiology Noted (Describe Specifically for Each System)  pulmonary;musculoskeletal;neuromuscular   -JR    Impairments Found (describe specific impairments)  gait, locomotion, and balance;aerobic capacity/endurance  -JR    Rehab Potential  good, to achieve stated therapy goals  -JR    Vital Signs    Pre SpO2 (%) 94  -SG 94  -JR    O2 Delivery Pre Treatment supplemental O2   4l  -SG supplemental O2   4LPM per nasal cannula  -JR    Intra SpO2 (%) 96  -SG 96  -JR    O2 Delivery Intra Treatment supplemental O2   4l  -SG supplemental O2   4 LPM per nasal cannula  -JR    Post SpO2 (%) 94  -SG 94  -JR    O2 Delivery Post Treatment supplemental O2   4l  -SG supplemental O2   4 LPM per nasal cannula  -JR    Rest Breaks  --   frequent restbreaks required  -SG     Pain Assessment    Pain Assessment 0-10  -SG 0-10  -JR    Pain Score 5  -SG 5  -JR    Post Pain Score 5  -SG 5  -JR    Pain Type Acute pain  -SG Acute pain  -JR    Pain Location Head  -SG Head  -JR    Pain Intervention(s)  Medication (See MAR);Ambulation/increased activity   Tylenol given by nursing prior to therapy  -JR    Response to Interventions --   nursing already provided Tylenol  -SG     Vision Assessment/Intervention    Visual Impairment WFL with corrective lenses  -SG     Cognitive Assessment/Intervention    Current Cognitive/Communication Assessment functional  -SG functional  -JR    Orientation Status oriented x 4  -SG oriented x 4  -JR    Follows Commands/Answers Questions  able to follow multi-step instructions;100% of the time  -JR    Personal Safety  decreased awareness, need for safety;decreased awareness, need for assist  -JR    Personal Safety Interventions  fall prevention program maintained;nonskid shoes/slippers when out of bed;gait belt  -JR    ROM (Range of Motion)    General ROM no range of motion deficits identified  -SG no range of motion deficits identified  -JR    MMT (Manual Muscle Testing)    General MMT Assessment upper extremity strength deficits identified  -SG     General MMT Assessment Detail  LE functionally 3/5  -JR    Upper  Extremity    Upper Ext Manual Muscle Testing left shoulder strength deficit;right shoulder strength deficit   bilateral shoulders 3+/5, bilateral elbows 4-/5  -SG     Bed Mobility, Assessment/Treatment    Bed Mobility, Assistive Device  bed rails;head of bed elevated  -JR    Bed Mob, Supine to Sit, [place] contact guard assist  -SG contact guard assist  -JR    Bed Mob, Sit to Supine, [place] contact guard assist  -SG contact guard assist  -JR    Transfer Assessment/Treatment    Transfers, Stand-Sit [place] contact guard assist  -SG contact guard assist  -JR    Gait Assessment/Treatment    Gait, [place] Level  minimum assist (75% patient effort)  -JR    Gait, Distance (Feet)  80   80' x 4 trials with standing rests between  -JR    Gait, Gait Pattern Analysis  swing-to gait  -JR    Gait, Gait Deviations  toe-to-floor clearance decreased;stride width increased;limb motion velocity decreased;pati decreased   path deviations, LOB x 5  -JR    Gait, Safety Issues  balance decreased during turns;sequencing ability decreased;supplemental O2   4LPM per nasal cannula  -JR    Gait, Impairments  impaired balance;strength decreased;coordination impaired  -    Gait, Comment  AQUINO, requires frequent stops for recovery  -JR    Positioning and Restraints    Pre-Treatment Position in bed  -SG in bed  -JR    Post Treatment Position bed  -SG bed  -JR    In Bed supine;call light within reach;encouraged to call for assist;with family/caregiver  - fowlers;call light within reach;encouraged to call for assist;with family/caregiver  -      03/19/17 1018 03/19/17 0023    General Information    Equipment Currently Used at Home oxygen  -LS oxygen  -VL    Living Environment    Lives With  alone  -VL    Living Arrangements  house  -VL    Home Accessibility  no concerns  -VL    Stair Railings at Home  none  -VL    Type of Financial/Environmental Concern  none  -VL    Transportation Available  car  -VL      User Key  (r)  = Recorded By, (t) = Taken By, (c) = Cosigned By    Initials Name Provider Type    JR Kaylee Kurtz, PT Physical Therapist    JOE Rodrigues Occupational Therapist    YENNIFER Mullins      Zaida Childress, VERNON Registered Nurse          Physical Therapy Education     Title: PT OT SLP Therapies (Active)     Topic: Physical Therapy (Done)     Point: Mobility training (Done)    Learning Progress Summary    Learner Readiness Method Response Comment Documented by Status   Patient Acceptance E VU Discussed physical needs and benefits of inpatient rehab  03/21/17 1849 Done   Family Acceptance E VU Discussed physical needs and benefits of inpatient rehab  03/21/17 1849 Done               Point: Home exercise program (Done)    Learning Progress Summary    Learner Readiness Method Response Comment Documented by Status   Patient Acceptance E VU Discussed physical needs and benefits of inpatient rehab  03/21/17 1849 Done   Family Acceptance E VU Discussed physical needs and benefits of inpatient rehab  03/21/17 1849 Done               Point: Body mechanics (Done)    Learning Progress Summary    Learner Readiness Method Response Comment Documented by Status   Patient Acceptance E VU Discussed physical needs and benefits of inpatient rehab  03/21/17 1849 Done   Family Acceptance E VU Discussed physical needs and benefits of inpatient rehab  03/21/17 1849 Done               Point: Precautions (Done)    Learning Progress Summary    Learner Readiness Method Response Comment Documented by Status   Patient Acceptance E VU Discussed physical needs and benefits of inpatient rehab  03/21/17 1849 Done   Family Acceptance E VU Discussed physical needs and benefits of inpatient rehab  03/21/17 1849 Done                      User Key     Initials Effective Dates Name Provider Type Discipline     10/26/16 -  Kaylee Kurtz, PT Physical Therapist PT                PT Recommendation and Plan  Anticipated Equipment  Needs At Discharge: front wheeled walker  Anticipated Discharge Disposition: inpatient rehabilitation facility  Planned Therapy Interventions: strengthening, balance training, transfer training, bed mobility training, gait training, patient/family education  PT Frequency: daily  Plan of Care Review  Plan Of Care Reviewed With: patient, mother  Outcome Summary/Follow up Plan: Patient participates well in skilled PT evaluation and is exepected to benefit from additional PT while hospitalized and upon discharge to inpatient rehab facility.  He has decreased strength, activity tolerance, balance, righting reactions, and gait quality.           IP PT Goals       03/21/17 1857          Bed Mobility PT LTG    Bed Mobility PT LTG, Date Established 03/21/17  -JR      Bed Mobility PT LTG, Time to Achieve 1 wk  -JR      Bed Mobility PT LTG, Activity Type all bed mobility  -JR      Bed Mobility PT LTG, [place] Level conditional independence  -JR      Bed Mobility PT Goal  LTG, Assist Device bed rails  -JR      Bed Mobility PT LTG, Date Goal Reviewed 03/21/17  -JR      Bed Mobility PT LTG, Outcome goal ongoing  -JR      Transfer Training PT LTG    Transfer Training PT LTG, Date Established 03/21/17  -JR      Transfer Training PT LTG, Time to Achieve 1 wk  -JR      Transfer Training PT LTG, Activity Type bed to chair /chair to bed;sit to stand/stand to sit  -JR      Transfer Training PT LTG, [place] Level conditional independence  -JR      Transfer Training PT LTG, Assist Device walker, rolling  -JR      Transfer Training PT  LTG, Date Goal Reviewed 03/21/17  -JR      Transfer Training PT LTG, Outcome goal ongoing  -JR      Gait Training PT LTG    Gait Training Goal PT LTG, Date Established 03/21/17  -JR      Gait Training Goal PT LTG, Time to Achieve 1 wk  -JR      Gait Training Goal PT LTG, [place] Level conditional independence  -JR      Gait Training Goal PT LTG, Assist Device walker, rolling  -JR      Gait  Training Goal PT LTG, Distance to Achieve 250  -JR      Gait Training Goal PT LTG, Date Goal Reviewed 03/21/17  -      Gait Training Goal PT LTG, Outcome goal ongoing  -JR        User Key  (r) = Recorded By, (t) = Taken By, (c) = Cosigned By    Initials Name Provider Type    JR Kaylee Kurtz, PT Physical Therapist                Outcome Measures       03/21/17 1520 03/21/17 1518       How much help from another person do you currently need...    Turning from your back to your side while in flat bed without using bedrails?  3  -JR     Moving from lying on back to sitting on the side of a flat bed without bedrails?  3  -JR     Moving to and from a bed to a chair (including a wheelchair)?  3  -JR     Standing up from a chair using your arms (e.g., wheelchair, bedside chair)?  3  -JR     Climbing 3-5 steps with a railing?  2  -JR     To walk in hospital room?  3  -JR     AM-PAC 6 Clicks Score  17  -JR     How much help from another is currently needed...    Putting on and taking off regular lower body clothing? 3  -SG      Bathing (including washing, rinsing, and drying) 3  -SG      Toileting (which includes using toilet bed pan or urinal) 3  -SG      Putting on and taking off regular upper body clothing 3  -SG      Taking care of personal grooming (such as brushing teeth) 3  -SG      Eating meals 4  -SG      Score 19  -SG      Functional Assessment    Outcome Measure Options AM-PAC 6 Clicks Daily Activity (OT)  -SG AM-PAC 6 Clicks Basic Mobility (PT)  -       User Key  (r) = Recorded By, (t) = Taken By, (c) = Cosigned By    Initials Name Provider Type    JR Kaylee Kurtz, PT Physical Therapist    JOE Rodrigues Occupational Therapist           Time Calculation:         PT Charges       03/21/17 1859          Time Calculation    Start Time 1518  -JR      PT Received On 03/21/17  -      PT Goal Re-Cert Due Date 03/31/17  -        User Key  (r) = Recorded By, (t) = Taken By, (c) = Cosigned By    Initials Name  Provider Type    JR Kaylee Kurtz, PT Physical Therapist          Therapy Charges for Today     Code Description Service Date Service Provider Modifiers Qty    [id number] HC PT EVAL MOD COMPLEXITY 4 3/21/2017 Kaylee Kurtz, PT GP 1          PT G-Codes  Outcome Measure Options: AM-PAC 6 Clicks Daily Activity (OT)      Kaylee Kurtz, PT  3/21/2017

## 2017-03-21 NOTE — PLAN OF CARE
Problem: Patient Care Overview (Adult)  Goal: Plan of Care Review  Outcome: Ongoing (interventions implemented as appropriate)    03/21/17 1649   Coping/Psychosocial Response Interventions   Plan Of Care Reviewed With patient;mother   Patient Care Overview   Progress (OT evaluation completed today.)   Outcome Evaluation   Outcome Summary/Follow up Plan Patient received supine in bed with mother present, on 4L O2. Patient agreeable to participate with OT. Patient required frequent restbreaks during LB dressing. Patient able to walk 80 feet with min A d/t decreased balance time 4 trials with standing restbreaks on 6L O2, no device.Upon return to bed patient rated breathing on exertional scale 7/10 on 4l O2 per NC. Discussed benefits of discharge to rehab facility to increase strength, balance, and independence with ADL's vs home with home health and 24 hour assistance. Patient plans to discuss with family.         Problem: Inpatient Occupational Therapy  Goal: Strength Goal LTG- OT  Outcome: Ongoing (interventions implemented as appropriate)    03/21/17 1649   Strength OT LTG   Strength Goal OT LTG, Date Established 03/21/17   Strength Goal OT LTG, Time to Achieve 2 wks   Strength Goal OT LTG, Measure to Achieve Patient will engage in BUE strengthening exercises 1x10 to improve functional strength for ADL's.   Strength Goal OT LTG, Outcome goal ongoing       Goal: Dynamic Standing Balance Goal LTG-OT  Outcome: Ongoing (interventions implemented as appropriate)    03/21/17 1649   Dynamic Standing Balance OT LTG   Dynamic Standing Balance OT LTG, Date Established 03/21/17   Dynamic Standing Balance OT LTG, Time to Achieve 2 wks   Dynamic Standing Balance OT LTG, [place] Level contact guard assist   Dynamic Standing Balance OT LTG, Additional Goal Patient will engage in dynamic standing balance activities with CGA to increase balance and safety for ADL's.   Dynamic Standing Balance OT LTG, Outcome goal ongoing        Goal: UB Dressing Goal LTG- OT  Outcome: Ongoing (interventions implemented as appropriate)    03/21/17 1649   UB Dressing OT LTG   UB Dressing Goal OT LTG, Date Established 03/21/17   UB Dressing Goal OT LTG, Time to Achieve 2 wks   UB Dressing Goal OT LTG, [place] Level supervision required   UB Dressing Goal OT LTG, Outcome goal ongoing       Goal: Activity Tolerance Goal LTG- OT  Outcome: Ongoing (interventions implemented as appropriate)    03/21/17 1649   Activity Tolerance OT LTG   Activity Tolerance Goal OT LTG, Date Established 03/21/17   Activity Tolerance Goal OT LTG, Time to Achieve 2 wks   Activity Tolerance Goal OT LTG, Activity Level 20 min activity   Activity Tolerance Goal OT LTG, Additional Goal Patient will be able to tolerate 20 min of ther ex/ther act.   Activity Tolerance Goal OT LTG, Outcome goal ongoing

## 2017-03-21 NOTE — DISCHARGE PLACEMENT REQUEST
"JALYN Negrete  Case Management  Phone:  [phone]; Fax:  [phone]    PT/OT notes.        Parsons, Thurman Mendel (66 y.o. Male)     Date of Birth Social Security Number Address Home Phone MRN    1950  [id number] [facility] [phone] [id number]    Jain Marital Status          None        Admission Date Admission Type Admitting Provider Attending Provider Department, Room/Bed    3/18/17 Emergency Jermaine Perez MD Gaspar, Daniel F., MD [facility] MED SURG  3, 321/1    Discharge Date Discharge Disposition Discharge Destination                      Attending Provider: Edward Davis MD     Allergies:  Sulfa Antibiotics    Isolation:  None   Infection:  None   Code Status:  FULL    Ht:  68\" (172.7 cm)   Wt:  143 lb 1.3 oz (64.9 kg)    Admission Cmt:  None   Principal Problem:  COPD with exacerbation [J44.1]                 Active Insurance as of 3/18/2017     Primary Coverage     Payor Plan Insurance Group Employer/Plan Group    [facility] MEDICARE REPLACEMENT [facility] [id number]     Payor Plan Address Payor Plan Phone Number Effective From Effective To    [address] [address]  1/1/2016     [place] [address]       Subscriber Name Subscriber Birth Date Member ID       PARSONS,THURMAN MENDEL 1950 [id number]                 Emergency Contacts      (Rel.) Home Phone Work Phone Mobile Phone    RustamEdward -- -- [phone]    Jean Marie Pena -- -- [phone]    April Easton -- -- [phone]    JeanMinda (Mother) [phone] -- --            Physical Therapy Notes (last 72 hours) (Notes from 3/18/2017  5:11 PM through 3/21/2017  5:11 PM)     No notes of this type exist for this encounter.           Occupational Therapy Notes (last 72 hours) (Notes from 3/18/2017  5:11 PM through 3/21/2017  5:11 PM)      Klaudia Rodrigues at 3/21/2017  5:03 PM  Version 1 of 1         Problem: Patient Care Overview " (Adult)  Goal: Plan of Care Review  Outcome: Ongoing (interventions implemented as appropriate)    03/21/17 1649   Coping/Psychosocial Response Interventions   Plan Of Care Reviewed With patient;mother   Patient Care Overview   Progress (OT evaluation completed today.)   Outcome Evaluation   Outcome Summary/Follow up Plan Patient received supine in bed with mother present, on 4L O2. Patient agreeable to participate with OT. Patient required frequent restbreaks during LB dressing. Patient able to walk 80 feet with min A d/t decreased balance time 4 trials with standing restbreaks on 6L O2, no device.Upon return to bed patient rated breathing on exertional scale 7/10 on 4l O2 per NC. Discussed benefits of discharge to rehab facility to increase strength, balance, and independence with ADL's vs home with home health and 24 hour assistance. Patient plans to discuss with family.         Problem: Inpatient Occupational Therapy  Goal: Strength Goal LTG- OT  Outcome: Ongoing (interventions implemented as appropriate)    03/21/17 1649   Strength OT LTG   Strength Goal OT LTG, Date Established 03/21/17   Strength Goal OT LTG, Time to Achieve 2 wks   Strength Goal OT LTG, Measure to Achieve Patient will engage in BUE strengthening exercises 1x10 to improve functional strength for ADL's.   Strength Goal OT LTG, Outcome goal ongoing       Goal: Dynamic Standing Balance Goal LTG-OT  Outcome: Ongoing (interventions implemented as appropriate)    03/21/17 1649   Dynamic Standing Balance OT LTG   Dynamic Standing Balance OT LTG, Date Established 03/21/17   Dynamic Standing Balance OT LTG, Time to Achieve 2 wks   Dynamic Standing Balance OT LTG, [place] Level contact guard assist   Dynamic Standing Balance OT LTG, Additional Goal Patient will engage in dynamic standing balance activities with CGA to increase balance and safety for ADL's.   Dynamic Standing Balance OT LTG, Outcome goal ongoing       Goal: UB Dressing Goal LTG-  OT  Outcome: Ongoing (interventions implemented as appropriate)    17 1649   UB Dressing OT LTG   UB Dressing Goal OT LTG, Date Established 17   UB Dressing Goal OT LTG, Time to Achieve 2 wks   UB Dressing Goal OT LTG, [place] Level supervision required   UB Dressing Goal OT LTG, Outcome goal ongoing       Goal: Activity Tolerance Goal LTG- OT  Outcome: Ongoing (interventions implemented as appropriate)    17 1649   Activity Tolerance OT LTG   Activity Tolerance Goal OT LTG, Date Established 17   Activity Tolerance Goal OT LTG, Time to Achieve 2 wks   Activity Tolerance Goal OT LTG, Activity Level 20 min activity   Activity Tolerance Goal OT LTG, Additional Goal Patient will be able to tolerate 20 min of ther ex/ther act.   Activity Tolerance Goal OT LTG, Outcome goal ongoing              Electronically signed by Klaudia Rodrigues at 3/21/2017  5:03 PM      Klaudia Rodrigues at 3/21/2017  5:06 PM  Version 1 of 1         Acute Care - Occupational Therapy Initial Evaluation  [facility]     Patient Name: Thurman Mendel Parsons  : 1950  MRN: [id number]  Today's Date: 3/21/2017  Onset of Illness/Injury or Date of Surgery Date: 17  Date of Referral to OT: 17  Referring Physician: Dr Davis    Admit Date: 3/18/2017       ICD-10-CM ICD-9-CM   1. COPD with exacerbation J44.1 491.21   2. Impaired mobility and ADLs Z74.09 799.89     Patient Active Problem List   Diagnosis   • Anxiety   • Atherosclerotic heart disease of native coronary artery without angina pectoris   • Atrial fibrillation   • Chronic bronchitis   • Chronic kidney disease, stage III (moderate)   • Steroid-dependent COPD   • Degeneration of cervical intervertebral disc   • Diabetes mellitus   • GERD without esophagitis   • Diverticulosis   • Hemorrhoids   • Hiatal hernia   • Hyperlipidemia   • Hypertension   • Kyphosis, acquired   • Mitral and aortic valve disease   • Phimosis   • Sleep apnea   • Enlarged  prostate without lower urinary tract symptoms (luts)   • History of arthritis   • Back pain   • Cardiac pain   • Depression   • Hematuria   • Chronic respiratory failure   • Stroke syndrome   • History of ventricular septal defect   • COPD with exacerbation     Past Medical History   Diagnosis Date   • Abnormal liver enzymes    • Acute bronchitis    • Anemia    • Anxiety    • Arthritis    • Atherosclerotic heart disease of native coronary artery without angina pectoris    • Atrial fibrillation    • Atypical chest pain    • Back pain    • BPH (benign prostatic hyperplasia)    • Cardiac pain    • Chronic bronchitis    • Chronic kidney disease    • COPD (chronic obstructive pulmonary disease)    • Degeneration of cervical intervertebral disc    • Depression    • Diabetes mellitus    • Diverticulosis    • Dyspnea    • Esophageal reflux    • Esophagitis    • Gastritis    • Hematuria    • Hemorrhoids    • Hiatal hernia    • History of arthritis    • History of myocardial infarction    • History of peripheral neuropathy    • History of ventricular septal defect    • History of ventricular septal defect    • Hyperlipidemia    • Hypertension    • Infectious diarrhea    • Infectious diarrhea    • Kyphosis, acquired    • Lumbar radiculopathy    • Mitral and aortic valve disease    • Myocardial infarction    • Nephrolithiasis    • Phimosis    • Respiratory failure    • Sleep apnea    • Stroke syndrome    • Stroke syndrome    • Urinary calculus    • Urinary tract infection    • Ventral hernia      Past Surgical History   Procedure Laterality Date   • Hernia repair     • Suprapubic catheter insertion       History of Percutaneous Catheter Placement Into Ureter   • Throat surgery     • Vsd repair       History of VSD Repair By Patch Closure          OT ASSESSMENT FLOWSHEET (last 72 hours)      OT Evaluation       03/21/17 1520 03/19/17 1018 03/19/17 1016 03/19/17 0023       Rehab Evaluation    Document Type evaluation  -SG         Subjective Information agree to therapy;complains of;pain  -SG        Patient Effort, Rehab Treatment adequate  -SG        Symptoms Noted During/After Treatment shortness of breath  -SG        Symptoms Noted Comment --   after walking rated breathing exertion 7/10  -SG        General Information    Patient Profile Review yes  -SG        Onset of Illness/Injury or Date of Surgery Date 03/18/17  -SG        Referring Physician Dr Davis  -SG        General Observations Patient received supine in bed on 4L O2 per NC, mother present.  -SG        Pertinent History Of Current Problem athersclerotic heart disease, CKD stage 3, steroid dependent COPD, degeneration of cervical intervertebral disc, GERD, hiatial hernia, hyperlipidenia, HTN, chronic resp failure  -SG        Precautions/Limitations oxygen therapy device and L/min  -SG        Prior Level of Function independent:;all household mobility;community mobility;ADL's;home management;cooking;driving  -SG        Equipment Currently Used at Home oxygen;shower chair  -SG oxygen  -LS  oxygen  -VL     Plans/Goals Discussed With patient and family;agreed upon  -SG        Risks Reviewed patient and family:;increased discomfort  -SG        Benefits Reviewed patient and family:;improve function;increase independence;increase strength  -SG        Barriers to Rehab none identified  -SG        Living Environment    Lives With alone  -SG   alone  -VL     Living Arrangements house  -SG   house  -VL     Home Accessibility stairs to enter home;stairs (1 railing present)  -SG   no concerns  -VL     Number of Stairs to Enter Home 2  -SG        Stair Railings at Home outside, present on right side  -SG   none  -VL     Type of Financial/Environmental Concern none  -SG   none  -VL     Transportation Available car;family or friend will provide  -SG   car  -VL     Clinical Impression    Date of Referral to OT 03/21/17  -SG        OT Diagnosis weakness, decreased ADL independence  -SG         Prognosis good  -SG        Patient/Family Goals Statement Return home independently.  -SG        Criteria for Skilled Therapeutic Interventions Met yes  -SG        Rehab Potential good, to achieve stated therapy goals  -SG        Therapy Frequency 3-5 times/wk  -SG        Anticipated Discharge Disposition inpatient rehabilitation facility  -SG        Functional Level Prior    Ambulation 0-->independent  -SG  0-->independent  -LS 0-->independent  -VL     Transferring 0-->independent  -SG  0-->independent  -LS 0-->independent  -VL     Toileting 0-->independent  -SG  0-->independent  -LS 0-->independent  -VL     Bathing 0-->independent  -SG  0-->independent  -LS 0-->independent  -VL     Dressing 0-->independent  -SG  0-->independent  -LS 0-->independent  -VL     Eating 0-->independent  -SG  0-->independent  -LS 0-->independent  -VL     Communication 0-->understands/communicates without difficulty  -SG  0-->understands/communicates without difficulty  -LS 0-->understands/communicates without difficulty  -VL     Swallowing 0-->swallows foods/liquids without difficulty  -SG  0-->swallows foods/liquids without difficulty  -LS 0-->swallows foods/liquids without difficulty  -VL     Vital Signs    Pre SpO2 (%) 94  -SG        O2 Delivery Pre Treatment supplemental O2   4l  -SG        Intra SpO2 (%) 96  -SG        O2 Delivery Intra Treatment supplemental O2   4l  -SG        Post SpO2 (%) 94  -SG        O2 Delivery Post Treatment supplemental O2   4l  -SG        Rest Breaks  --   frequent restbreaks required  -SG        Pain Assessment    Pain Assessment 0-10  -SG        Pain Score 5  -SG        Post Pain Score 5  -SG        Pain Type Acute pain  -SG        Pain Location Head  -SG        Response to Interventions --   nursing already provided Tylenol  -SG        Vision Assessment/Intervention    Visual Impairment WFL with corrective lenses  -SG        Cognitive Assessment/Intervention    Current Cognitive/Communication  Assessment functional  -        Orientation Status oriented x 4  -        ROM (Range of Motion)    General ROM no range of motion deficits identified  -        MMT (Manual Muscle Testing)    General MMT Assessment upper extremity strength deficits identified  -        Upper Extremity    Upper Ext Manual Muscle Testing left shoulder strength deficit;right shoulder strength deficit   bilateral shoulders 3+/5, bilateral elbows 4-/5  -        Bed Mobility, Assessment/Treatment    Bed Mob, Supine to Sit, [place] contact guard assist  -        Bed Mob, Sit to Supine, [place] contact guard assist  -        Transfer Assessment/Treatment    Transfers, Stand-Sit [place] contact guard assist  -        Functional Mobility    Functional Mobility- Ind. Level minimum assist (75% patient effort)  -        Functional Mobility-Distance (Feet) 80   Patient able to walk 80 feet x 4 trials with CGA using gait  -        Functional Mobility- Safety Issues balance decreased during turns;step length decreased;weight-shifting ability decreased   decreased balance and LOB with min A to regain balance  -        ADL Assessment/Intervention    Additional Documentation --   required extra time and frequent restbreaks during ADL   -        Upper Body Bathing Assessment/Training    UB Bathing Assess/Train, Position sitting  -        UB Bathing Assess/Train, [place] Level stand by assist  -        Lower Body Bathing Assessment/Training    LB Bathing Assess/Train, Position sitting  -        LB Bathing Assess/Train, [place] Level minimum assist (75% patient effort)  -        Upper Body Dressing Assessment/Training    UB Dressing Assess/Train, Position sitting  -        UB Dressing Assess/Train, [place] minimum assist (75% patient effort)  -        Lower Body Dressing Assessment/Training    LB Dressing Assess/Train, Position sitting  -        LB Dressing Assess/Train, [place]  minimum assist (75% patient effort)  -SG        Toileting Assessment/Training    Toileting Assess/Train, Position sitting  -SG        Toileting Assess/Train, [place] Level minimum assist (75% patient effort)  -SG        Grooming Assessment/Training    Grooming Assess/Train, Position standing  -SG        Grooming Assess/Train, [place]ep[place] Level minimum assist (75% patient effort)  -SG        General Therapy Interventions    Planned Therapy Interventions activity intolerance;ADL retraining;balance training;energy conservation;strengthening;transfer training  -        Positioning and Restraints    Pre-Treatment Position in bed  -SG        Post Treatment Position bed  -SG        In Bed supine;call light within reach;encouraged to call for assist;with family/caregiver  -          User Key  (r) = Recorded By, (t) = Taken By, (c) = Cosigned By    Initials Name Effective Dates     Klaudia Rodrigues 10/26/16 -     YENNIFER Mullins 10/26/16 -     VL Zaida Childress RN 10/28/16 -            Occupational Therapy Education     Title: PT OT SLP Therapies (Active)     Topic: Occupational Therapy (Active)     Point: ADL training (Done)    Description: Instruct learner(s) on proper safety adaptation and remediation techniques during self care or transfers.   Instruct in proper use of assistive devices.    Learning Progress Summary    Learner Readiness Method Response Comment Documented by Status   Patient Acceptance E VU Patient and mother educated on role of OT and benefits of rehab vs home with home health and 24 hour care.  03/21/17 1646 Done   Family Acceptance E VU Patient and mother educated on role of OT and benefits of rehab vs home with home health and 24 hour care.  03/21/17 1646 Done                      User Key     Initials Effective Dates Name Provider Type Discipline     10/26/16 -  Klaudia Rodrigues Occupational Therapist OT                  OT Recommendation and Plan  Anticipated Discharge Disposition:  inpatient rehabilitation facility  Planned Therapy Interventions: activity intolerance, ADL retraining, balance training, energy conservation, strengthening, transfer training  Therapy Frequency: 3-5 times/wk  Plan of Care Review  Plan Of Care Reviewed With: patient, mother  Progress:  (OT evaluation completed today.)  Outcome Summary/Follow up Plan: Patient received supine in bed with mother present, on 4L O2. Patient agreeable to participate with OT. Patient required frequent restbreaks during LB dressing.  Patient able to walk 80 feet with min A  d/t decreased balance time 4 trials with standing restbreaks on 6L O2, no device.Upon return to bed patient rated breathing on exertional scale 7/10 on 4l O2 per NC. Discussed benefits of discharge to rehab facility to increase strength, balance, and independence with ADL's vs home with home health and 24 hour assistance. Patient plans to discuss with family.          OT Goals       03/21/17 1649          Strength OT LTG    Strength Goal OT LTG, Date Established 03/21/17  -SG      Strength Goal OT LTG, Time to Achieve 2 wks  -SG      Strength Goal OT LTG, Measure to Achieve Patient will engage in BUE strengthening exercises 1x10 to improve functional strength for ADL's.  -SG      Strength Goal OT LTG, Outcome goal ongoing  -SG      Dynamic Standing Balance OT LTG    Dynamic Standing Balance OT LTG, Date Established 03/21/17  -SG      Dynamic Standing Balance OT LTG, Time to Achieve 2 wks  -SG      Dynamic Standing Balance OT LTG, [place] Level contact guard assist  -SG      Dynamic Standing Balance OT LTG, Additional Goal Patient will engage in dynamic standing balance activities with CGA to increase balance and safety for ADL's.  -SG      Dynamic Standing Balance OT LTG, Outcome goal ongoing  -SG      UB Dressing OT LTG    UB Dressing Goal OT LTG, Date Established 03/21/17  -SG      UB Dressing Goal OT LTG, Time to Achieve 2 wks  -SG      UB Dressing Goal OT LTG,  [place] Level supervision required  -SG      UB Dressing Goal OT LTG, Outcome goal ongoing  -SG      Activity Tolerance OT LTG    Activity Tolerance Goal OT LTG, Date Established 03/21/17  -SG      Activity Tolerance Goal OT LTG, Time to Achieve 2 wks  -SG      Activity Tolerance Goal OT LTG, Activity Level 20 min activity  -SG      Activity Tolerance Goal OT LTG, Additional Goal Patient will be able to tolerate 20 min of ther ex/ther act.  -SG      Activity Tolerance Goal OT LTG, Outcome goal ongoing  -SG        User Key  (r) = Recorded By, (t) = Taken By, (c) = Cosigned By    Initials Name Provider Type    JOE Rodrigues Occupational Therapist                Outcome Measures       03/21/17 1520          How much help from another is currently needed...    Putting on and taking off regular lower body clothing? 3  -SG      Bathing (including washing, rinsing, and drying) 3  -SG      Toileting (which includes using toilet bed pan or urinal) 3  -SG      Putting on and taking off regular upper body clothing 3  -SG      Taking care of personal grooming (such as brushing teeth) 3  -SG      Eating meals 4  -SG      Score 19  -SG      Functional Assessment    Outcome Measure Options AM-PAC 6 Clicks Daily Activity (OT)  -SG        User Key  (r) = Recorded By, (t) = Taken By, (c) = Cosigned By    Initials Name Provider Type    JOE Rodrigues Occupational Therapist          Time Calculation:   OT Start Time: 1520    Therapy Charges for Today     Code Description Service Date Service Provider Modifiers Qty    44296160178 HC OT EVAL MOD COMPLEXITY 4 3/21/2017 Klaudia Rodrigues GO 1               Klaudia Rodrigues  3/21/2017     Electronically signed by Klaudia Rodrigues at 3/21/2017  5:08 PM

## 2017-03-21 NOTE — PLAN OF CARE
Problem: COPD, Chronic Bronchitis/Emphysema (Adult)  Goal: Signs and Symptoms of Listed Potential Problems Will be Absent or Manageable (COPD, Chronic Bronchitis/Emphysema)  Outcome: Ongoing (interventions implemented as appropriate)    03/21/17 0332   COPD, Chronic Bronchitis/Emphysema   Problems Assessed (COPD, Chronic Bronchitis/Emphysema) all   Problems Present (COPD, Chronic Bronchitis/Emphysema) dyspnea

## 2017-03-22 LAB
ANION GAP SERPL CALCULATED.3IONS-SCNC: 7.6 MMOL/L
BASOPHILS # BLD AUTO: 0.01 10*3/MM3 (ref 0–0.2)
BASOPHILS NFR BLD AUTO: 0.1 % (ref 0–2.5)
BUN BLD-MCNC: 32 MG/DL (ref 7–20)
BUN/CREAT SERPL: 35.6 (ref 6.3–21.9)
CALCIUM SPEC-SCNC: 8.6 MG/DL (ref 8.4–10.2)
CHLORIDE SERPL-SCNC: 104 MMOL/L (ref 98–107)
CO2 SERPL-SCNC: 34 MMOL/L (ref 26–30)
CREAT BLD-MCNC: 0.9 MG/DL (ref 0.6–1.3)
DEPRECATED RDW RBC AUTO: 39.7 FL (ref 37–54)
EOSINOPHIL # BLD AUTO: 0 10*3/MM3 (ref 0–0.7)
EOSINOPHIL NFR BLD AUTO: 0 % (ref 0–7)
ERYTHROCYTE [DISTWIDTH] IN BLOOD BY AUTOMATED COUNT: 11.5 % (ref 11.5–14.5)
GFR SERPL CREATININE-BSD FRML MDRD: 84 ML/MIN/1.73
GLUCOSE BLD-MCNC: 124 MG/DL (ref 74–98)
GLUCOSE BLDC GLUCOMTR-MCNC: 117 MG/DL (ref 70–130)
GLUCOSE BLDC GLUCOMTR-MCNC: 127 MG/DL (ref 70–130)
GLUCOSE BLDC GLUCOMTR-MCNC: 135 MG/DL (ref 70–130)
GLUCOSE BLDC GLUCOMTR-MCNC: 138 MG/DL (ref 70–130)
HCT VFR BLD AUTO: 37.2 % (ref 42–52)
HGB BLD-MCNC: 11.9 G/DL (ref 14–18)
IMM GRANULOCYTES # BLD: 0.07 10*3/MM3 (ref 0–0.06)
IMM GRANULOCYTES NFR BLD: 0.8 % (ref 0–0.6)
LYMPHOCYTES # BLD AUTO: 0.54 10*3/MM3 (ref 0.6–3.4)
LYMPHOCYTES NFR BLD AUTO: 6.5 % (ref 10–50)
MCH RBC QN AUTO: 30.2 PG (ref 27–31)
MCHC RBC AUTO-ENTMCNC: 32 G/DL (ref 30–37)
MCV RBC AUTO: 94.4 FL (ref 80–94)
MONOCYTES # BLD AUTO: 0.28 10*3/MM3 (ref 0–0.9)
MONOCYTES NFR BLD AUTO: 3.3 % (ref 0–12)
NEUTROPHILS # BLD AUTO: 7.47 10*3/MM3 (ref 2–6.9)
NEUTROPHILS NFR BLD AUTO: 89.3 % (ref 37–80)
NRBC BLD MANUAL-RTO: 0 /100 WBC (ref 0–0)
PLATELET # BLD AUTO: 169 10*3/MM3 (ref 130–400)
PMV BLD AUTO: 9.7 FL (ref 6–12)
POTASSIUM BLD-SCNC: 4.6 MMOL/L (ref 3.5–5.1)
RBC # BLD AUTO: 3.94 10*6/MM3 (ref 4.7–6.1)
SODIUM BLD-SCNC: 141 MMOL/L (ref 137–145)
WBC NRBC COR # BLD: 8.37 10*3/MM3 (ref 4.8–10.8)

## 2017-03-22 PROCEDURE — 82962 GLUCOSE BLOOD TEST: CPT

## 2017-03-22 PROCEDURE — 25010000002 METHYLPREDNISOLONE PER 40 MG: Performed by: INTERNAL MEDICINE

## 2017-03-22 PROCEDURE — 80048 BASIC METABOLIC PNL TOTAL CA: CPT | Performed by: INTERNAL MEDICINE

## 2017-03-22 PROCEDURE — 94799 UNLISTED PULMONARY SVC/PX: CPT

## 2017-03-22 PROCEDURE — 99232 SBSQ HOSP IP/OBS MODERATE 35: CPT | Performed by: INTERNAL MEDICINE

## 2017-03-22 PROCEDURE — 97116 GAIT TRAINING THERAPY: CPT

## 2017-03-22 PROCEDURE — 25010000002 ENOXAPARIN PER 10 MG: Performed by: INTERNAL MEDICINE

## 2017-03-22 PROCEDURE — 94660 CPAP INITIATION&MGMT: CPT

## 2017-03-22 PROCEDURE — 25010000002 CEFTRIAXONE: Performed by: INTERNAL MEDICINE

## 2017-03-22 PROCEDURE — 25010000002 AZITHROMYCIN: Performed by: INTERNAL MEDICINE

## 2017-03-22 PROCEDURE — 97530 THERAPEUTIC ACTIVITIES: CPT

## 2017-03-22 PROCEDURE — 85025 COMPLETE CBC W/AUTO DIFF WBC: CPT | Performed by: INTERNAL MEDICINE

## 2017-03-22 RX ADMIN — CEFTRIAXONE 1 G: 1 INJECTION, POWDER, FOR SOLUTION INTRAMUSCULAR; INTRAVENOUS at 01:39

## 2017-03-22 RX ADMIN — METHYLPREDNISOLONE SODIUM SUCCINATE 40 MG: 40 INJECTION, POWDER, FOR SOLUTION INTRAMUSCULAR; INTRAVENOUS at 17:19

## 2017-03-22 RX ADMIN — TAMSULOSIN HYDROCHLORIDE 0.4 MG: 0.4 CAPSULE ORAL at 09:55

## 2017-03-22 RX ADMIN — METHYLPREDNISOLONE SODIUM SUCCINATE 40 MG: 40 INJECTION, POWDER, FOR SOLUTION INTRAMUSCULAR; INTRAVENOUS at 05:53

## 2017-03-22 RX ADMIN — ALPRAZOLAM 0.5 MG: 0.5 TABLET ORAL at 22:20

## 2017-03-22 RX ADMIN — ENOXAPARIN SODIUM 40 MG: 40 INJECTION SUBCUTANEOUS at 09:55

## 2017-03-22 RX ADMIN — CARVEDILOL 6.25 MG: 6.25 TABLET, FILM COATED ORAL at 17:19

## 2017-03-22 RX ADMIN — IPRATROPIUM BROMIDE AND ALBUTEROL SULFATE 3 ML: .5; 3 SOLUTION RESPIRATORY (INHALATION) at 12:27

## 2017-03-22 RX ADMIN — ESCITALOPRAM OXALATE 10 MG: 10 TABLET ORAL at 09:55

## 2017-03-22 RX ADMIN — ACETAMINOPHEN 650 MG: 325 TABLET ORAL at 10:09

## 2017-03-22 RX ADMIN — ACETAMINOPHEN 650 MG: 325 TABLET ORAL at 04:02

## 2017-03-22 RX ADMIN — ACETAMINOPHEN 650 MG: 325 TABLET ORAL at 19:05

## 2017-03-22 RX ADMIN — CEFTRIAXONE 1 G: 1 INJECTION, POWDER, FOR SOLUTION INTRAMUSCULAR; INTRAVENOUS at 23:33

## 2017-03-22 RX ADMIN — IPRATROPIUM BROMIDE AND ALBUTEROL SULFATE 3 ML: .5; 3 SOLUTION RESPIRATORY (INHALATION) at 20:49

## 2017-03-22 RX ADMIN — BUDESONIDE AND FORMOTEROL FUMARATE DIHYDRATE 2 PUFF: 80; 4.5 AEROSOL RESPIRATORY (INHALATION) at 08:58

## 2017-03-22 RX ADMIN — ATORVASTATIN CALCIUM 40 MG: 40 TABLET, FILM COATED ORAL at 09:55

## 2017-03-22 RX ADMIN — AZITHROMYCIN 500 MG: 500 INJECTION, POWDER, LYOPHILIZED, FOR SOLUTION INTRAVENOUS at 22:20

## 2017-03-22 RX ADMIN — IPRATROPIUM BROMIDE AND ALBUTEROL SULFATE 3 ML: .5; 3 SOLUTION RESPIRATORY (INHALATION) at 08:58

## 2017-03-22 RX ADMIN — CLOPIDOGREL BISULFATE 75 MG: 75 TABLET ORAL at 09:55

## 2017-03-22 NOTE — PLAN OF CARE
Problem: Patient Care Overview (Adult)  Goal: Plan of Care Review  Outcome: Ongoing (interventions implemented as appropriate)    03/22/17 1508   Coping/Psychosocial Response Interventions   Plan Of Care Reviewed With patient   Patient Care Overview   Progress improving   Outcome Evaluation   Outcome Summary/Follow up Plan Pt demo benoit to participate in increased activity with O2 sats remaining at 93%. Continue with IPOT POC.         Problem: Inpatient Occupational Therapy  Goal: Strength Goal LTG- OT  Outcome: Ongoing (interventions implemented as appropriate)    03/21/17 1649 03/22/17 1508   Strength OT LTG   Strength Goal OT LTG, Date Established 03/21/17 --    Strength Goal OT LTG, Time to Achieve 2 wks --    Strength Goal OT LTG, Measure to Achieve Patient will engage in BUE strengthening exercises 1x10 to improve functional strength for ADL's. --    Strength Goal OT LTG, Outcome --  goal ongoing       Goal: Dynamic Standing Balance Goal LTG-OT  Outcome: Ongoing (interventions implemented as appropriate)    03/21/17 1649 03/22/17 1508   Dynamic Standing Balance OT LTG   Dynamic Standing Balance OT LTG, Date Established 03/21/17 --    Dynamic Standing Balance OT LTG, Time to Achieve 2 wks --    Dynamic Standing Balance OT LTG, [place] Level contact guard assist --    Dynamic Standing Balance OT LTG, Additional Goal Patient will engage in dynamic standing balance activities with CGA to increase balance and safety for ADL's. --    Dynamic Standing Balance OT LTG, Outcome --  goal ongoing       Goal: UB Dressing Goal LTG- OT  Outcome: Ongoing (interventions implemented as appropriate)    03/21/17 1649 03/22/17 1508   UB Dressing OT LTG   UB Dressing Goal OT LTG, Date Established 03/21/17 --    UB Dressing Goal OT LTG, Time to Achieve 2 wks --    UB Dressing Goal OT LTG, [place] Level supervision required --    UB Dressing Goal OT LTG, Outcome --  goal ongoing       Goal: Activity Tolerance Goal LTG-  OT  Outcome: Ongoing (interventions implemented as appropriate)    03/21/17 1649 03/22/17 1508   Activity Tolerance OT LTG   Activity Tolerance Goal OT LTG, Date Established 03/21/17 --    Activity Tolerance Goal OT LTG, Time to Achieve 2 wks --    Activity Tolerance Goal OT LTG, Activity Level 20 min activity --    Activity Tolerance Goal OT LTG, Additional Goal Patient will be able to tolerate 20 min of ther ex/ther act. --    Activity Tolerance Goal OT LTG, Outcome --  goal ongoing         03/21/17 1649 03/22/17 1508   Activity Tolerance OT LTG   Activity Tolerance Goal OT LTG, Date Established 03/21/17 --    Activity Tolerance Goal OT LTG, Time to Achieve 2 wks --    Activity Tolerance Goal OT LTG, Activity Level 20 min activity --    Activity Tolerance Goal OT LTG, Additional Goal Patient will be able to tolerate 20 min of ther ex/ther act. --    Activity Tolerance Goal OT LTG, Outcome --  goal ongoing

## 2017-03-22 NOTE — PROGRESS NOTES
"Continued Stay Note  [facility]     Patient Name: Thurman Mendel Parsons  MRN: [id number]  Today's Date: 3/22/2017    Admit Date: 3/18/2017          Discharge Plan       03/22/17 1302    Case Management/Social Work Plan    Plan Received call from Sylvia from [place] this .  Reports able to accept pt for short term rehab.  Later informed pt stating Dr Lopez voiced concern over more exposure to \"infection\" at a facility.  Pt stating not sure if he wants to go.    Additional Comments Left voice message at Dr Lopez's office re: pt's concern.  Requested call back.              Discharge Codes     None        Expected Discharge Date and Time     Expected Discharge Date Expected Discharge Time    Mar 21, 2017             Michaela Snowden    "

## 2017-03-22 NOTE — PLAN OF CARE
Problem: Patient Care Overview (Adult)  Goal: Plan of Care Review  Outcome: Ongoing (interventions implemented as appropriate)  Goal: Adult Individualization and Mutuality  Outcome: Ongoing (interventions implemented as appropriate)    Problem: COPD, Chronic Bronchitis/Emphysema (Adult)  Goal: Signs and Symptoms of Listed Potential Problems Will be Absent or Manageable (COPD, Chronic Bronchitis/Emphysema)  Outcome: Ongoing (interventions implemented as appropriate)

## 2017-03-22 NOTE — NURSING NOTE
GALINDO teaching for COPD instructed on prevention of infection and management with teachback  Pt able to recognize triggers and give return demonstration of purse lip breathing

## 2017-03-22 NOTE — PLAN OF CARE
Problem: Patient Care Overview (Adult)  Goal: Plan of Care Review  Outcome: Ongoing (interventions implemented as appropriate)    03/22/17 1545   Coping/Psychosocial Response Interventions   Plan Of Care Reviewed With patient   Patient Care Overview   Progress improving   Outcome Evaluation   Outcome Summary/Follow up Plan Pt able to participate with increased activity with O2 sats remaining above 93%. Cont to goals.         Problem: Inpatient Physical Therapy  Goal: Bed Mobility Goal LTG- PT  Outcome: Ongoing (interventions implemented as appropriate)    03/21/17 1857 03/22/17 1545   Bed Mobility PT LTG   Bed Mobility PT LTG, Date Established 03/21/17 --    Bed Mobility PT LTG, Time to Achieve 1 wk --    Bed Mobility PT LTG, Activity Type all bed mobility --    Bed Mobility PT LTG, [place] Level conditional independence --    Bed Mobility PT Goal LTG, Assist Device bed rails --    Bed Mobility PT LTG, Date Goal Reviewed 03/21/17 --    Bed Mobility PT LTG, Outcome --  goal ongoing       Goal: Transfer Training Goal 1 LTG- PT  Outcome: Ongoing (interventions implemented as appropriate)    03/21/17 1857 03/22/17 1545   Transfer Training PT LTG   Transfer Training PT LTG, Date Established 03/21/17 --    Transfer Training PT LTG, Time to Achieve 1 wk --    Transfer Training PT LTG, Activity Type bed to chair /chair to bed;sit to stand/stand to sit --    Transfer Training PT LTG, [place] Level conditional independence --    Transfer Training PT LTG, Assist Device walker, rolling --    Transfer Training PT LTG, Date Goal Reviewed 03/21/17 --    Transfer Training PT LTG, Outcome --  goal ongoing       Goal: Gait Training Goal LTG- PT  Outcome: Ongoing (interventions implemented as appropriate)    03/21/17 1857 03/22/17 1545   Gait Training PT LTG   Gait Training Goal PT LTG, Date Established 03/21/17 --    Gait Training Goal PT LTG, Time to Achieve 1 wk --    Gait Training Goal PT LTG, [place] Level  conditional independence --    Gait Training Goal PT LTG, Assist Device walker, rolling --    Gait Training Goal PT LTG, Distance to Achieve 250 --    Gait Training Goal PT LTG, Date Goal Reviewed 03/21/17 --    Gait Training Goal PT LTG, Outcome --  goal ongoing

## 2017-03-22 NOTE — CONSULTS
Consults  Patient Care Team:  Miguel Rojas MD as PCP - General  Miguel Rojas MD as PCP - Family Medicine   Management team: Hospitalist    Chief complaint: With exacerbation with respiratory distress.  2 days' duration prior to hospitalization  Patient had been ambulatory last week but became increasingly weak and lives by himself.    Subjective     History of Present Illness   2 days prior to this hospitalization patient started having increasing shortness of air with cough and thick mucopurulent sputum without hemoptysis without any high spiking fevers but low-grade fever was documented associated with malaise.  Patient denied any associated chest pains but does have paroxysmal atrial fibrillation.  Patient was admitted with hypercapnia respiratory mild acidosis and hypoxemia which is correctable with supplemental oxygen.  This was believed to be due to severe VQ mismatch due to COPD exacerbation and bronchospasm.  He does have history of atherosclerotic cardiovascular disease with no recent history of angina history of paroxysmal atrial fibrillation CKD3 disease.  Patient's blood gases have shown improvement in PCO2 of 71 with a pH of 7.33 to currently pH of 7.41 with PCO2 to his baseline of 54 PO2 has been corrected to 95 bicarbonate 34.  Patient's carboxyhemoglobin was 4.6 yet he continues to say that he does not smoke.  His last moderate COPD exacerbation requiring increased dose of steroids and oral antibiotic was in December 2016.  His last hospitalization for COPD exacerbation was more than 4 months ago.  Since passing away of his wife more than a year ago he has continued to have depression, decreased eating with weight loss.  He has been advised to come in for outpatient pulmonary rehabilitation but has continued to not do that which can actually improve his functional status, quality of life and also be able to  new respiratory illness is early.      Review of Systems   Constitutional: Positive  for activity change, appetite change and fatigue.        Patient has been having increasing fatigue, weight loss with decreased appetite and depression leading to poor eating.   Respiratory: Positive for cough and wheezing.         He did have marked wheezing on admission which has improved able to move air much easier now.   Psychiatric/Behavioral: Positive for decreased concentration and sleep disturbance. The patient is nervous/anxious.       Respiratory ROS: positive for - cough, shortness of breath, sputum changes, tachypnea and wheezing  Pt. Reports Snoring, witnessed apneas, non restorative sleep with EDS, Morning Head aches, Nocturia and restless sleep  His current chest x-ray has shown chronic changes suggestive of chronic bronchitis with no acute infiltrates or atelectasis is no pneumothorax.  No evidence of pleural effusion noted nor any evidence of congestive heart failure.  Heart size appears to be normal with no increase in hilar pulmonary vascular markings.  Patient does have a pacemaker in the right ventricle.  Pacemaker battery in the right upper chest.    Past Medical History   Diagnosis Date   • Abnormal liver enzymes    • Acute bronchitis    • Anemia    • Anxiety    • Arthritis    • Atherosclerotic heart disease of native coronary artery without angina pectoris    • Atrial fibrillation    • Atypical chest pain    • Back pain    • BPH (benign prostatic hyperplasia)    • Cardiac pain    • Chronic bronchitis    • Chronic kidney disease    • COPD (chronic obstructive pulmonary disease)    • Degeneration of cervical intervertebral disc    • Depression    • Diabetes mellitus    • Diverticulosis    • Dyspnea    • Esophageal reflux    • Esophagitis    • Gastritis    • Hematuria    • Hemorrhoids    • Hiatal hernia    • History of arthritis    • History of myocardial infarction    • History of peripheral neuropathy    • History of ventricular septal defect    • History of ventricular septal defect    •  Hyperlipidemia    • Hypertension    • Infectious diarrhea    • Infectious diarrhea    • Kyphosis, acquired    • Lumbar radiculopathy    • Mitral and aortic valve disease    • Myocardial infarction    • Nephrolithiasis    • Phimosis    • Respiratory failure    • Sleep apnea    • Stroke syndrome    • Stroke syndrome    • Urinary calculus    • Urinary tract infection    • Ventral hernia    ,   Past Surgical History   Procedure Laterality Date   • Hernia repair     • Suprapubic catheter insertion       History of Percutaneous Catheter Placement Into Ureter   • Throat surgery     • Vsd repair       History of VSD Repair By Patch Closure   ,   Family History   Problem Relation Age of Onset   • Other Father      CABG   • Coronary artery disease Father    ,   Social History   Substance Use Topics   • Smoking status: Former Smoker   • Smokeless tobacco: None   • Alcohol use No   ,   Prescriptions Prior to Admission   Medication Sig Dispense Refill Last Dose   • amLODIPine-benazepril (LOTREL) 5-40 MG per capsule Take 1 capsule by mouth daily.   3/19/2017   • aspirin 81 MG tablet Take 81 mg by mouth daily.   3/19/2017   • atorvastatin (LIPITOR) 40 MG tablet Take 1 tablet by mouth Daily. 90 tablet 1 3/19/2017 at Unknown time   • albuterol (PROVENTIL) (2.5 MG/3ML) 0.083% nebulizer solution Albuterol Sulfate (2.5 MG/3ML) 0.083% Inhalation Nebulization Solution; Patient Sig: Albuterol Sulfate (2.5 MG/3ML) 0.083% Inhalation Nebulization Solution USE 1 UNIT DOSE EVERY 4-6 HOURS AS NEEDED FOR WHEEZING .; 3; 5; 04-Sep-2012; Active   3/19/2017   • alfuzosin (UROXATRAL) 10 MG 24 hr tablet TAKE ONE TABLET BY MOUTH EVERY MORNING 30 tablet 4 3/19/2017   • ALPRAZolam (XANAX) 0.5 MG tablet Take 1 tablet by mouth At Night As Needed for anxiety. 30 tablet 3 3/19/2017   • amLODIPine (NORVASC) 5 MG tablet TAKE ONE TABLET BY MOUTH DAILY 90 tablet 0 3/19/2017   • BREO ELLIPTA 200-25 MCG/INH aerosol powder    10 3/19/2017   • carvedilol (COREG)  6.25 MG tablet Take 1 tablet by mouth 2 (Two) Times a Day With Meals. 180 tablet 1 3/19/2017   • clopidogrel (PLAVIX) 75 MG tablet Take 1 tablet by mouth Daily. 90 tablet 1 3/19/2017   • cyclobenzaprine (FLEXERIL) 10 MG tablet Take 5 mg by mouth 2 (Two) Times a Day As Needed.   3/19/2017   • escitalopram (LEXAPRO) 10 MG tablet Take 1 tablet by mouth Daily. 90 tablet 1 3/19/2017   • famotidine (PEPCID) 20 MG tablet TAKE ONE TABLET BY MOUTH DAILY ** NEED TO CALL PRIMARY DOCTOR FOR REFILLS 30 tablet 0 3/19/2017   • fluticasone (FLONASE) 50 MCG/ACT nasal spray    3/19/2017   • iron polysaccharides (POLY-IRON 150) 150 MG capsule Take  by mouth daily.   3/19/2017   • losartan (COZAAR) 100 MG tablet Take 1 tablet by mouth daily. 90 tablet 3 3/19/2017   • nitroglycerin (NITROSTAT) 0.4 MG SL tablet Place  under the tongue.   3/19/2017   • oxybutynin (DITROPAN) 5 MG tablet TAKE ONE TABLET BY MOUTH AT BEDTIME 30 tablet 5 3/19/2017   • predniSONE (DELTASONE) 10 MG tablet Take 10 mg by mouth Daily.   3/19/2017   • PROAIR  (90 BASE) MCG/ACT inhaler    3/19/2017   • tamsulosin (FLOMAX) 0.4 MG capsule 24 hr capsule TAKE ONE CAPSULE BY MOUTH DAILY 30 capsule 3 3/19/2017   , Scheduled Meds:    atorvastatin 40 mg Oral Daily   azithromycin 500 mg Intravenous Q24H   budesonide-formoterol 2 puff Inhalation BID - RT   carvedilol 6.25 mg Oral BID With Meals   ceftriaxone 1 g Intravenous Q24H   clopidogrel 75 mg Oral Daily   enoxaparin 40 mg Subcutaneous Daily   escitalopram 10 mg Oral Daily   insulin regular 0-9 Units Subcutaneous Q6H   ipratropium-albuterol 3 mL Nebulization 4x Daily - RT   methylPREDNISolone sodium succinate 40 mg Intravenous Q12H   tamsulosin 0.4 mg Oral Daily   , PRN Meds:  •  acetaminophen  •  albuterol  •  ALPRAZolam  •  aluminum-magnesium hydroxide-simethicone  •  dextrose  •  dextrose  •  glucagon (human recombinant)  •  guaiFENesin  •  magnesium hydroxide  •  sodium chloride and Allergies:  Sulfa  antibiotics    Objective      Vital Signs  Temp:  [97.8 °F (36.6 °C)-98.7 °F (37.1 °C)] 97.8 °F (36.6 °C)  Heart Rate:  [57-75] 58  Resp:  [16-20] 18  BP: (119-141)/(55-85) 141/70  Body mass index is 21.76 kg/(m^2).    Physical Exam   Constitutional: He is oriented to person, place, and time.   Patient appears to be weak-looking but able to sit up by himself and get out of bed by himself.   HENT:   Head: Normocephalic and atraumatic.   Nose: Nose normal.   Mouth/Throat: Oropharynx is clear and moist.   Mild nasal mucosal congestion with loss of uvula posterior soft palate from prior surgery noted.   Eyes: Conjunctivae and EOM are normal. Pupils are equal, round, and reactive to light.   Neck: Normal range of motion. Neck supple. No JVD present. No tracheal deviation present. No thyromegaly present.   Cardiovascular: Normal rate, regular rhythm, normal heart sounds and intact distal pulses.    Pulmonary/Chest: Effort normal. No stridor.   Bilateral distant breath sounds but no wheezes or crackles noted   Abdominal: Soft. Bowel sounds are normal.   Musculoskeletal: Normal range of motion. He exhibits no edema or tenderness.   Lymphadenopathy:     He has no cervical adenopathy.   Neurological: He is alert and oriented to person, place, and time. He has normal reflexes.   Skin: Skin is warm and dry.   Psychiatric: His behavior is normal. Judgment and thought content normal.   Patient appears still somewhat depressed but communicates well and still able to live by himself at home.          Results Review:    I reviewed the patient's new clinical results.  I reviewed the patient's new imaging results and agree with the interpretation.  I reviewed the patient's other test results and agree with the interpretation  Lab Results (last 24 hours)     Procedure Component Value Units Date/Time    POC Glucose Fingerstick [[id number]]  (Normal) Collected:  03/20/17 2039    Specimen:  Blood Updated:  03/20/17 2125     Glucose 114  mg/dL       Serial Number: [id number]    : [id number]       POC Glucose Fingerstick [[id number]]  (Normal) Collected:  03/21/17 0559    Specimen:  Blood Updated:  03/21/17 0625     Glucose 126 mg/dL       Serial Number: [id number]    : [id number]       CBC Auto Differential [[id number]]  (Abnormal) Collected:  03/21/17 0613    Specimen:  Blood Updated:  03/21/17 0643     WBC 11.73 (H) 10*3/mm3      RBC 3.72 (L) 10*6/mm3      Hemoglobin 11.5 (L) g/dL      Hematocrit 35.2 (L) %      MCV 94.6 (H) fL      MCH 30.9 pg      MCHC 32.7 g/dL      RDW 11.5 %      RDW-SD 40.0 fl      MPV 9.6 fL      Platelets 160 10*3/mm3      Neutrophil % 89.3 (H) %      Lymphocyte % 5.5 (L) %      Monocyte % 4.8 %      Eosinophil % 0.0 %      Basophil % 0.1 %      Immature Grans % 0.3 %      Neutrophils, Absolute 10.48 (H) 10*3/mm3      Lymphocytes, Absolute 0.64 10*3/mm3      Monocytes, Absolute 0.56 10*3/mm3      Eosinophils, Absolute 0.00 10*3/mm3      Basophils, Absolute 0.01 10*3/mm3      Immature Grans, Absolute 0.04 10*3/mm3      nRBC 0.0 /100 WBC     Comprehensive Metabolic Panel [[id number]]  (Abnormal) Collected:  03/21/17 0613    Specimen:  Blood Updated:  03/21/17 0650     Glucose 114 (H) mg/dL      BUN 31 (H) mg/dL      Creatinine 0.90 mg/dL      Sodium 142 mmol/L      Potassium 4.4 mmol/L      Chloride 105 mmol/L      CO2 35.0 (H) mmol/L      Calcium 8.4 mg/dL      Total Protein 5.6 (L) g/dL      Albumin 3.20 (L) g/dL      ALT (SGPT) 26 U/L      AST (SGOT) 27 U/L      Alkaline Phosphatase 56 U/L      Total Bilirubin 0.5 mg/dL      eGFR Non African Amer 84 mL/min/1.73      Globulin 2.4 gm/dL      A/G Ratio 1.3 g/dL      BUN/Creatinine Ratio 34.4 (H)      Anion Gap 6.4 mmol/L     Narrative:       Abnormal estimated GFR should be followed by more specific studies to confirm end stage chronic renal disease. The equation used for calculation may not be accurate for patients less than 19 years old, greater than 70 years old,  patients at extremes of weight, malnutrition, or with acute renal dysfunction.    Blood Gas, Arterial [[id number]]  (Abnormal) Collected:  03/21/17 0823    Specimen:  Arterial Blood Updated:  03/21/17 0825     Site Arterial: left radial      Mynor's Test Positive      pH, Arterial 7.413 pH units      pCO2, Arterial 53.5 (H) mm Hg      pO2, Arterial 94.6 mm Hg      HCO3, Arterial 34.1 (H) mmol/L      Base Excess, Arterial 9.5 mmol/L      O2 Saturation, Arterial 98.3 %      Hemoglobin, Blood Gas 11.7 (L) g/dL      Barometric Pressure for Blood Gas 734 mmHg      Modality Cannula - Nasal      FIO2 36 %     POC Glucose Fingerstick [[id number]]  (Normal) Collected:  03/21/17 1137    Specimen:  Blood Updated:  03/21/17 1225     Glucose 91 mg/dL       Serial Number: [id number]    : [id number]       POC Glucose Fingerstick [[id number]]  (Normal) Collected:  03/21/17 1622    Specimen:  Blood Updated:  03/21/17 1850     Glucose 82 mg/dL       Serial Number: [id number]    : [id number]                 Assessment/Plan     Patient Active Problem List   Diagnosis   • Anxiety   • Atherosclerotic heart disease of native coronary artery without angina pectoris   • Atrial fibrillation   • Chronic bronchitis   • Chronic kidney disease, stage III (moderate)   • Steroid-dependent COPD   • Degeneration of cervical intervertebral disc   • Diabetes mellitus   • GERD without esophagitis   • Diverticulosis   • Hemorrhoids   • Hiatal hernia   • Hyperlipidemia   • Hypertension   • Kyphosis, acquired   • Mitral and aortic valve disease   • Phimosis   • Sleep apnea   • Enlarged prostate without lower urinary tract symptoms (luts)   • History of arthritis   • Back pain   • Cardiac pain   • Depression   • Hematuria   • Chronic respiratory failure   • Stroke syndrome   • History of ventricular septal defect   • COPD with exacerbation       Assessment & Plan   1.  COPD exacerbation with acute hypercapnic hypoxemic respiratory failure improved with  steroids, IV antibiotics, vigorous inhale and bronchodilator therapy and noninvasive positive pressure ventilation with BiPAP with in-line oxygen to maintain adequate O2 saturation.  2.  History of paroxysmal atrial fibrillation in a patient with ASHD with no recent history of angina nor any clinical or radiographic evidence of congestive heart failure  3.  Type 2 diabetes mellitus hyperglycemia will get worse with IV steroids.  4.  Obstructive sleep apnea poppet syndrome despite posterior soft palate and uvula resection for snoring and sleep apnea in the past with significant weight loss his sleep related breathing disorder has improved.  He was found to have moderate obstructive sleep apnea Poppa syndrome and required BiPAP in 2012 with intolerance to improve mask fit a and tolerance to BiPAP.  5.  Chronic anxiety with depressive disorder due to loss of his wife.  6.  Atherosclerotic heart disease with a history of permanent pacemaker functioning well.  7.  CK 3 disease with stable renal functions  8.  Chronic neck and back pains with intervertebral disc disease and degenerative joint disease.  9.  Chronic rhinitis with postnasal drainage with recurrent bouts of sinusitis and hoarseness of voice some of which is complicated by his continued small amount of smoking even if he denies.  Plan: Overnight cardiorespiratory sleep oximetry will be done tonight on his nasal cannula oxygen to evaluate for sleep related breathing disorder.  We will also do end-tidal CO2 monitoring with that.  Patient's hypercapnia has improved with a PCO2 of 53 currently.  Patient will also have 6 minute walk oximetry and gait and strength evaluation by physical therapy for plan of care regarding his ongoing need for either inpatient rehabilitation at local nursing home or home with outpatient pulmonary rehabilitation since he was able to ambulate and drive as of last week.      I discussed the patients findings and my recommendations with  patient    Kevin Lopez MD  03/21/17  8:03 PM

## 2017-03-22 NOTE — PROGRESS NOTES
[facility]    Name:  Thurman Mendel Parsons   Age:  66 y.o.  Sex:  male  :  1950  MRN:  [id number]   Visit Number:  [id number]  Admission Date:  3/18/2017  Date Of Service:  17  Primary Care Physician:  Miguel Rojas MD     LOS: 1 day :  Patient Care Team:  Miguel Rojas MD as PCP - General  Miguel Rojas MD as PCP - Family Medicine:    Chief Complaint:      SOA and weakness    Subjective / Interval History:     Patients SOA continues to improve.  His appetite and strength is improving as well.  However, still fairly weak and lives alone.      Vital Signs:    Temp:  [97.9 °F (36.6 °C)-98.4 °F (36.9 °C)] 98.2 °F (36.8 °C)  Heart Rate:  [55-77] 69  Resp:  [18-20] 18  BP: (130-147)/(62-87) 140/66    Intake and output:    Intake/Output       17 0700 - 17 0659 17 0700 - 17 0659    Intake (ml) 1260 840    Output (ml) 1396 810    Net (ml) -136 30    Last Weight 143 lb 1.3 oz (64.9 kg) --          Physical Examination:    General Appearance:  Alert and cooperative, not in any acute distress.   Head:  Atraumatic and normocephalic, without obvious abnormality.   Eyes:      PERRLA, Extra-occular movements are within normal limits.   Lungs:   Diffuse expiratory wheezing   Heart:  Normal S1 and S2, no murmur, no gallop, no rub.   Abdomen:   Normal bowel sounds,  Soft non-tender, non-distended, no guarding, no rebound tenderness   Extremities: No edema                     Laboratory results:      Results from last 7 days  Lab Units 17  0604 17  0613 17  0559 17  0547   SODIUM mmol/L 141 142 145 143   POTASSIUM mmol/L 4.6 4.4 4.2 4.2   CHLORIDE mmol/L 104 105 107 103   TOTAL CO2 mmol/L 34.0* 35.0* 32.0* 36.0*   BUN mg/dL 32* 31* 29* 27*   CREATININE mg/dL 0.90 0.90 0.90 0.90   CALCIUM mg/dL 8.6 8.4 8.3* 8.7   BILIRUBIN mg/dL  --  0.5 0.6 0.9   ALK PHOS U/L  --  56 54 66   ALT (SGPT) U/L  --  26 23 28   AST (SGOT) U/L  --   27 23 27   GLUCOSE mg/dL 124* 114* 132* 143*       Results from last 7 days  Lab Units 03/22/17  0604 03/21/17  0613 03/20/17  0559   WBC 10*3/mm3 8.37 11.73* 11.46*   HEMOGLOBIN g/dL 11.9* 11.5* 11.4*   HEMATOCRIT % 37.2* 35.2* 35.9*   PLATELETS 10*3/mm3 169 160 152       Results from last 7 days  Lab Units 03/18/17  2144   CK TOTAL U/L 61   TROPONIN I ng/mL 0.055*   CK MB INDEX % 5.2*           I have reviewed the patient's laboratory results.    Radiology results:    Imaging Results (last 24 hours)     ** No results found for the last 24 hours. **          I have reviewed the patient's radiology reports.    Medication Review:     I have reviewed the patients active and prn medications.       Assessment/Plan:  Principal Problem:  COPD with exacerbation      Active Problems:  Atherosclerotic heart disease of native coronary artery without angina pectoris  Chronic kidney disease, stage III (moderate)  Steroid-dependent COPD  Degeneration of cervical intervertebral disc  GERD without esophagitis  Hiatal hernia  Hyperlipidemia  Hypertension  Chronic respiratory failure      -Patients clinical symptoms slowly improving. Will continue with nebs q 4 hours, IV steroids and IV Azithro/Ceftriaxone. Flu screen negative. Dr. Lopez, pulmonary consulted. Rehab bed is pending. Patient is chronically on po steroids.      Edward Davis MD  03/22/17  4:25 PM

## 2017-03-22 NOTE — PROGRESS NOTES
Acute Care - Occupational Therapy Treatment Note   [facility]     Patient Name: Thurman Mendel Parsons  : 1950  MRN: [id number]  Today's Date: 3/22/2017  Onset of Illness/Injury or Date of Surgery Date: 17  Date of Referral to OT: 17  Referring Physician: Dr Davis      Admit Date: 3/18/2017    Visit Dx:     ICD-10-CM ICD-9-CM   1. COPD with exacerbation J44.1 491.21   2. Impaired mobility and ADLs Z74.09 799.89   3. Impaired functional mobility, balance, gait, and endurance Z74.09 V49.89     Patient Active Problem List   Diagnosis   • Anxiety   • Atherosclerotic heart disease of native coronary artery without angina pectoris   • Atrial fibrillation   • Chronic bronchitis   • Chronic kidney disease, stage III (moderate)   • Steroid-dependent COPD   • Degeneration of cervical intervertebral disc   • Diabetes mellitus   • GERD without esophagitis   • Diverticulosis   • Hemorrhoids   • Hiatal hernia   • Hyperlipidemia   • Hypertension   • Kyphosis, acquired   • Mitral and aortic valve disease   • Phimosis   • Sleep apnea   • Enlarged prostate without lower urinary tract symptoms (luts)   • History of arthritis   • Back pain   • Cardiac pain   • Depression   • Hematuria   • Chronic respiratory failure   • Stroke syndrome   • History of ventricular septal defect   • COPD with exacerbation             Adult Rehabilitation Note       17 [id number]          Rehab Assessment/Intervention    Discipline occupational therapist  -AC      Document Type therapy note (daily note)  -AC      Subjective Information agree to therapy  -AC      Patient Effort, Rehab Treatment good  -AC      Symptoms Noted During/After Treatment shortness of breath  -AC      Precautions/Limitations fall precautions;oxygen therapy device and L/min  -AC      Recorded by [AC] Akilah Quiroga OT      Vital Signs    Pre SpO2 (%) 96  -AC      O2 Delivery Pre Treatment supplemental O2  -AC      Intra SpO2 (%) 92  -AC      O2 Delivery Intra  Treatment supplemental O2  -AC      Post SpO2 (%) 93  -AC      O2 Delivery Post Treatment supplemental O2  -AC      Recorded by [AC] Akilah Quiroga OT      Pain Assessment    Pain Assessment No/denies pain  -AC      Recorded by [AC] Akilah Quiroga OT      Cognitive Assessment/Intervention    Current Cognitive/Communication Assessment functional  -AC      Orientation Status oriented to;person;place  -AC      Follows Commands/Answers Questions able to follow single-step instructions  -AC      Personal Safety WNL/WFL  -AC      Personal Safety Interventions fall prevention program maintained;gait belt;nonskid shoes/slippers when out of bed  -AC      Recorded by [AC] Akilah Quiroga OT      Bed Mobility, Assessment/Treatment    Bed Mob, Supine to Sit, [place] supervision required  -AC      Bed Mob, Sit to Supine, [place] supervision required  -AC      Recorded by [AC] Akilah Quiroga OT      Transfer Assessment/Treatment    Transfers, Sit-Stand [place] contact guard assist  -AC      Transfers, Stand-Sit [place] contact guard assist  -AC      Transfers, Sit-Stand-Sit, Assist Device --   gait belt and UE support  -AC      Recorded by [AC] Akilah Quiroga OT      Functional Mobility    Functional Mobility- Ind. Level contact guard assist  -AC      Functional Mobility- Device --   gait belt and UE support  -AC      Functional Mobility-Distance (Feet) 188  -AC      Functional Mobility- Safety Issues balance decreased during turns  -AC      Recorded by [AC] Akilah Quiroga OT      Lower Body Dressing Assessment/Training    LB Dressing Assess/Train, Comment Pt was able to bring foot up to knee and adjust socks to where  were on the bottom  -AC      Recorded by [AC] Akilah Quiroga OT      Therapy Exercises    Bilateral Upper Extremity AROM:;10 reps;elbow flexion/extension;shoulder extension/flexion;shoulder horizontal abd/add  -AC      Recorded by [AC] Akilah Quiroga OT      Positioning and Restraints     Pre-Treatment Position in bed  -AC      Post Treatment Position bed  -AC      In Bed supine;call light within reach;encouraged to call for assist  -AC      Recorded by [AC] Akilah Quiroga OT        User Key  (r) = Recorded By, (t) = Taken By, (c) = Cosigned By    Initials Name Effective Dates    AC Akilah Quiroga, OT 06/23/15 -                 OT Goals       03/22/17 1508 03/21/17 1649       Strength OT LTG    Strength Goal OT LTG, Date Established  03/21/17  -SG     Strength Goal OT LTG, Time to Achieve  2 wks  -SG     Strength Goal OT LTG, Measure to Achieve  Patient will engage in BUE strengthening exercises 1x10 to improve functional strength for ADL's.  -SG     Strength Goal OT LTG, Outcome goal ongoing  -AC goal ongoing  -SG     Dynamic Standing Balance OT LTG    Dynamic Standing Balance OT LTG, Date Established  03/21/17  -SG     Dynamic Standing Balance OT LTG, Time to Achieve  2 wks  -SG     Dynamic Standing Balance OT LTG, [place] Level  contact guard assist  -SG     Dynamic Standing Balance OT LTG, Additional Goal  Patient will engage in dynamic standing balance activities with CGA to increase balance and safety for ADL's.  -SG     Dynamic Standing Balance OT LTG, Outcome goal ongoing  -AC goal ongoing  -SG     UB Dressing OT LTG    UB Dressing Goal OT LTG, Date Established  03/21/17  -SG     UB Dressing Goal OT LTG, Time to Achieve  2 wks  -SG     UB Dressing Goal OT LTG, [place] Level  supervision required  -SG     UB Dressing Goal OT LTG, Outcome goal ongoing  -AC goal ongoing  -SG     Activity Tolerance OT LTG    Activity Tolerance Goal OT LTG, Date Established  03/21/17  -SG     Activity Tolerance Goal OT LTG, Time to Achieve  2 wks  -SG     Activity Tolerance Goal OT LTG, Activity Level  20 min activity  -SG     Activity Tolerance Goal OT LTG, Additional Goal  Patient will be able to tolerate 20 min of ther ex/ther act.  -SG     Activity Tolerance Goal OT LTG, Outcome goal ongoing  -AC goal  ongoing  -       User Key  (r) = Recorded By, (t) = Taken By, (c) = Cosigned By    Initials Name Provider Type     Akilah Quiroga, OT Occupational Therapist     Klaudia Rodrigues Occupational Therapist          Occupational Therapy Education     Title: PT OT SLP Therapies (Active)     Topic: Occupational Therapy (Active)     Point: ADL training (Done)    Description: Instruct learner(s) on proper safety adaptation and remediation techniques during self care or transfers.   Instruct in proper use of assistive devices.    Learning Progress Summary    Learner Readiness Method Response Comment Documented by Status   Patient Acceptance E VU safety with transfers  03/22/17 1507 Done    Acceptance E VU Patient and mother educated on role of OT and benefits of rehab vs home with home health and 24 hour care.  03/21/17 1646 Done   Family Acceptance E VU Patient and mother educated on role of OT and benefits of rehab vs home with home health and 24 hour care.  03/21/17 1646 Done                      User Key     Initials Effective Dates Name Provider Type Discipline     06/23/15 -  Akilah Quiroga, OT Occupational Therapist OT     10/26/16 -  Klaudia Rodrigues Occupational Therapist OT                  OT Recommendation and Plan  Anticipated Discharge Disposition: inpatient rehabilitation facility  Planned Therapy Interventions: activity intolerance, ADL retraining, balance training, energy conservation, strengthening, transfer training  Therapy Frequency: 3-5 times/wk  Plan of Care Review  Plan Of Care Reviewed With: patient  Progress: improving  Outcome Summary/Follow up Plan: Pt demo benoit to participate in increased activity with O2 sats remaining at 93%.  Continue with IPOT POC.        Outcome Measures       03/22/17 1353 03/21/17 1520 03/21/17 1518    How much help from another person do you currently need...    Turning from your back to your side while in flat bed without using bedrails?   3  -JR     Moving from lying on back to sitting on the side of a flat bed without bedrails?   3  -JR    Moving to and from a bed to a chair (including a wheelchair)?   3  -JR    Standing up from a chair using your arms (e.g., wheelchair, bedside chair)?   3  -JR    Climbing 3-5 steps with a railing?   2  -JR    To walk in hospital room?   3  -JR    AM-PAC 6 Clicks Score   17  -JR    How much help from another is currently needed...    Putting on and taking off regular lower body clothing? 3  -AC 3  -SG     Bathing (including washing, rinsing, and drying) 3  -AC 3  -SG     Toileting (which includes using toilet bed pan or urinal) 3  -AC 3  -SG     Putting on and taking off regular upper body clothing 3  -AC 3  -SG     Taking care of personal grooming (such as brushing teeth) 4  -AC 3  -SG     Eating meals 4  -AC 4  -SG     Score 20  -AC 19  -SG     Functional Assessment    Outcome Measure Options AM-PAC 6 Clicks Daily Activity (OT)  -AC AM-PAC 6 Clicks Daily Activity (OT)  -SG AM-PAC 6 Clicks Basic Mobility (PT)  -JR      User Key  (r) = Recorded By, (t) = Taken By, (c) = Cosigned By    Initials Name Provider Type    AC Akilah Quiroga OT Occupational Therapist    JR Kaylee Kurtz, PT Physical Therapist    SG Klaudia Rodrigues Occupational Therapist           Time Calculation:         Time Calculation- OT       03/22/17 1513          Time Calculation- OT    OT Start Time 1353  -      Total Timed Code Minutes- OT 15 minute(s)  -      OT Received On 03/22/17  -      OT Goal Re-Cert Due Date 04/04/17  -        User Key  (r) = Recorded By, (t) = Taken By, (c) = Cosigned By    Initials Name Provider Type    PUJA Quiroga OT Occupational Therapist           Therapy Charges for Today     Code Description Service Date Service Provider Modifiers Qty    15461237044  OT THERAPEUTIC ACT EA 15 MIN 3/22/2017 Akilah Quiroga OT GO 1               Akilah Quiroga OT  3/22/2017

## 2017-03-22 NOTE — PROGRESS NOTES
"Continued Stay Note  [facility]     Patient Name: Thurman Mendel Parsons  MRN: [id number]  Today's Date: 3/22/2017    Admit Date: 3/18/2017          Discharge Plan       03/22/17 1450    Case Management/Social Work Plan    Plan Spoke with Dr Lopez who related waiting to see how pt progressed with PT today.  Later PT reported pt ambulated 180feet however felt he could benefit from short term rehab.      03/22/17 1302    Case Management/Social Work Plan    Plan Received call from Sylvia from [facility] this .  Reports able to accept pt for short term rehab.  Later informed pt stating Dr Lopez voiced concern over more exposure to \"infection\" at a facility.  Pt stating not sure if he wants to go.    Additional Comments Left voice message at Dr Lopez's office re: pt's concern.  Requested call back.              Discharge Codes     None        Expected Discharge Date and Time     Expected Discharge Date Expected Discharge Time    Mar 21, 2017             Michaela Snowden    "

## 2017-03-22 NOTE — PROGRESS NOTES
Acute Care - Physical Therapy Treatment Note   [facility]     Patient Name: Thurman Mendel Parsons  : 1950  MRN: [id number]  Today's Date: 3/22/2017  Onset of Illness/Injury or Date of Surgery Date: 17  Date of Referral to PT: 17  Referring Physician: Dr Davis    Admit Date: 3/18/2017    Visit Dx:    ICD-10-CM ICD-9-CM   1. COPD with exacerbation J44.1 491.21   2. Impaired mobility and ADLs Z74.09 799.89   3. Impaired functional mobility, balance, gait, and endurance Z74.09 V49.89     Patient Active Problem List   Diagnosis   • Anxiety   • Atherosclerotic heart disease of native coronary artery without angina pectoris   • Atrial fibrillation   • Chronic bronchitis   • Chronic kidney disease, stage III (moderate)   • Steroid-dependent COPD   • Degeneration of cervical intervertebral disc   • Diabetes mellitus   • GERD without esophagitis   • Diverticulosis   • Hemorrhoids   • Hiatal hernia   • Hyperlipidemia   • Hypertension   • Kyphosis, acquired   • Mitral and aortic valve disease   • Phimosis   • Sleep apnea   • Enlarged prostate without lower urinary tract symptoms (luts)   • History of arthritis   • Back pain   • Cardiac pain   • Depression   • Hematuria   • Chronic respiratory failure   • Stroke syndrome   • History of ventricular septal defect   • COPD with exacerbation               Adult Rehabilitation Note       17 1353 17 1352       Rehab Assessment/Intervention    Discipline occupational therapist  -AC physical therapist  -LM     Document Type therapy note (daily note)  -AC therapy note (daily note)  -LM     Subjective Information agree to therapy  -AC agree to therapy  -LM     Patient Effort, Rehab Treatment good  -AC good  -LM     Symptoms Noted During/After Treatment shortness of breath  -AC shortness of breath  -LM     Precautions/Limitations fall precautions;oxygen therapy device and L/min  -AC fall precautions;oxygen therapy device and L/min  -LM     Recorded by [AC]  Akilah Quiroga, OT [LM] Xochitl Hernandez, PT     Vital Signs    Pre SpO2 (%) 96  -AC 96  -LM     O2 Delivery Pre Treatment supplemental O2  -AC supplemental O2  -LM     Intra SpO2 (%) 92  -AC 92  -LM     O2 Delivery Intra Treatment supplemental O2  -AC supplemental O2  -LM     Post SpO2 (%) 93  -AC 93  -LM     O2 Delivery Post Treatment supplemental O2  -AC supplemental O2  -LM     Recorded by [AC] Akilah Quiroga, OT [LM] Xochitl Hernandez, PT     Pain Assessment    Pain Assessment No/denies pain  -AC No/denies pain  -LM     Recorded by [AC] Akilah Quiroga, OT [LM] Xochitl Hernandez, PT     Cognitive Assessment/Intervention    Current Cognitive/Communication Assessment functional  -AC functional  -LM     Orientation Status oriented to;person;place  -AC      Follows Commands/Answers Questions able to follow single-step instructions  -AC      Personal Safety WNL/WFL  -AC      Personal Safety Interventions fall prevention program maintained;gait belt;nonskid shoes/slippers when out of bed  -AC      Recorded by [AC] Akilah Quiroga, OT [LM] Xochitl Hernandez, PT     Bed Mobility, Assessment/Treatment    Bed Mob, Supine to Sit, [place] supervision required  -AC supervision required  -LM     Bed Mob, Sit to Supine, [place] supervision required  -AC supervision required  -LM     Recorded by [AC] Akilah Quiroga OT [LM] Xochitl Hernandez, PT     Transfer Assessment/Treatment    Transfers, Sit-Stand [place] contact guard assist  -AC contact guard assist  -LM     Transfers, Stand-Sit [place] contact guard assist  -AC contact guard assist  -LM     Transfers, Sit-Stand-Sit, Assist Device --   gait belt and UE support  -AC      Recorded by [AC] Akilah Quiroga, OT [LM] Xochitl Hernandez, PT     Gait Assessment/Treatment    Gait, [place] Level  contact guard assist  -LM     Gait, Distance (Feet)  188  -LM     Gait, Comment  trial RW  -LM     Recorded by  [LM] Xochitl Hernandez, PT     Functional Mobility    Functional Mobility- Ind. Level contact  guard assist  -AC      Functional Mobility- Device --   gait belt and UE support  -AC      Functional Mobility-Distance (Feet) 188  -AC      Functional Mobility- Safety Issues balance decreased during turns  -AC      Recorded by [AC] Akilah Quiroga OT      Lower Body Dressing Assessment/Training    LB Dressing Assess/Train, Comment Pt was able to bring foot up to knee and adjust socks to where  were on the bottom  -AC      Recorded by [AC] Akilah Quiroga OT      Therapy Exercises    Bilateral Lower Extremities  AROM:;10 reps;sitting;ankle pumps/circles;LAQ   Seated marches  -LM     Bilateral Upper Extremity AROM:;10 reps;elbow flexion/extension;shoulder extension/flexion;shoulder horizontal abd/add  -AC      Recorded by [AC] Akilah Quiroga, OT [LM] Xochitl Hernandez, PT     Positioning and Restraints    Pre-Treatment Position in bed  -AC in bed  -LM     Post Treatment Position bed  -AC bed  -LM     In Bed supine;call light within reach;encouraged to call for assist  -AC supine;call light within reach;encouraged to call for assist  -LM     Recorded by [AC] Akilah Quiroga, OT [LM] Xochitl Hernandez, PT       User Key  (r) = Recorded By, (t) = Taken By, (c) = Cosigned By    Initials Name Effective Dates    AC Akilah Quiroga OT 06/23/15 -     LM Xochitl Hernandez, PT 10/26/16 -                 IP PT Goals       03/22/17 1545 03/21/17 1857       Bed Mobility PT LTG    Bed Mobility PT LTG, Date Established  03/21/17  -JR     Bed Mobility PT LTG, Time to Achieve  1 wk  -JR     Bed Mobility PT LTG, Activity Type  all bed mobility  -JR     Bed Mobility PT LTG, [place] Level  conditional independence  -JR     Bed Mobility PT Goal  LTG, Assist Device  bed rails  -JR     Bed Mobility PT LTG, Date Goal Reviewed  03/21/17  -JR     Bed Mobility PT LTG, Outcome goal ongoing  -LM goal ongoing  -JR     Transfer Training PT LTG    Transfer Training PT LTG, Date Established  03/21/17  -JR     Transfer Training PT LTG, Time to Achieve  1 wk   -JR     Transfer Training PT LTG, Activity Type  bed to chair /chair to bed;sit to stand/stand to sit  -JR     Transfer Training PT LTG, [place] Level  conditional independence  -JR     Transfer Training PT LTG, Assist Device  walker, rolling  -JR     Transfer Training PT  LTG, Date Goal Reviewed  03/21/17  -JR     Transfer Training PT LTG, Outcome goal ongoing  -LM goal ongoing  -JR     Gait Training PT LTG    Gait Training Goal PT LTG, Date Established  03/21/17  -JR     Gait Training Goal PT LTG, Time to Achieve  1 wk  -JR     Gait Training Goal PT LTG, [place] Level  conditional independence  -JR     Gait Training Goal PT LTG, Assist Device  walker, rolling  -JR     Gait Training Goal PT LTG, Distance to Achieve  250  -JR     Gait Training Goal PT LTG, Date Goal Reviewed  03/21/17  -JR     Gait Training Goal PT LTG, Outcome goal ongoing  -LM goal ongoing  -JR       User Key  (r) = Recorded By, (t) = Taken By, (c) = Cosigned By    Initials Name Provider Type    JR Kaylee Kurtz, PT Physical Therapist    LM Xochitl Hernandez, PT Physical Therapist          Physical Therapy Education     Title: PT OT SLP Therapies (Active)     Topic: Physical Therapy (Done)     Point: Mobility training (Done)    Learning Progress Summary    Learner Readiness Method Response Comment Documented by Status   Patient Acceptance E VU PLB to alleviate SOA; ther ex for HEP; trial RW for safe transfers/ambulation-increased endurance.  03/22/17 1545 Done    Acceptance E VU Discussed physical needs and benefits of inpatient rehab  03/21/17 1849 Done   Family Acceptance E VU Discussed physical needs and benefits of inpatient rehab  03/21/17 1849 Done               Point: Home exercise program (Done)    Learning Progress Summary    Learner Readiness Method Response Comment Documented by Status   Patient Acceptance E VU PLB to alleviate SOA; ther ex for HEP; trial RW for safe transfers/ambulation-increased endurance. LM 03/22/17 6114  Done    Acceptance E VU Discussed physical needs and benefits of inpatient rehab  03/21/17 1849 Done   Family Acceptance E VU Discussed physical needs and benefits of inpatient rehab  03/21/17 1849 Done               Point: Body mechanics (Done)    Learning Progress Summary    Learner Readiness Method Response Comment Documented by Status   Patient Acceptance E VU Discussed physical needs and benefits of inpatient rehab  03/21/17 1849 Done   Family Acceptance E VU Discussed physical needs and benefits of inpatient rehab  03/21/17 1849 Done               Point: Precautions (Done)    Learning Progress Summary    Learner Readiness Method Response Comment Documented by Status   Patient Acceptance E VU Discussed physical needs and benefits of inpatient rehab  03/21/17 1849 Done   Family Acceptance E VU Discussed physical needs and benefits of inpatient rehab  03/21/17 1849 Done                      User Key     Initials Effective Dates Name Provider Type Discipline     10/26/16 -  Kaylee Kurtz, PT Physical Therapist PT     10/26/16 -  Xochitl Hernandez, PT Physical Therapist PT                    PT Recommendation and Plan  Anticipated Equipment Needs At Discharge: front wheeled walker  Anticipated Discharge Disposition: inpatient rehabilitation facility  Planned Therapy Interventions: strengthening, balance training, transfer training, bed mobility training, gait training, patient/family education  PT Frequency: daily  Plan of Care Review  Plan Of Care Reviewed With: patient  Progress: improving  Outcome Summary/Follow up Plan: Pt able to participate with increased activity with O2 sats remaining above 93%.  Cont to goals.          Outcome Measures       03/22/17 1353 03/22/17 1352 03/21/17 1520    How much help from another person do you currently need...    Turning from your back to your side while in flat bed without using bedrails?  4  -LM     Moving from lying on back to sitting on the side of a flat bed  without bedrails?  3  -LM     Moving to and from a bed to a chair (including a wheelchair)?  3  -LM     Standing up from a chair using your arms (e.g., wheelchair, bedside chair)?  3  -LM     Climbing 3-5 steps with a railing?  2  -LM     To walk in hospital room?  3  -LM     AM-PAC 6 Clicks Score  18  -LM     How much help from another is currently needed...    Putting on and taking off regular lower body clothing? 3  -AC  3  -SG    Bathing (including washing, rinsing, and drying) 3  -AC  3  -SG    Toileting (which includes using toilet bed pan or urinal) 3  -AC  3  -SG    Putting on and taking off regular upper body clothing 3  -AC  3  -SG    Taking care of personal grooming (such as brushing teeth) 4  -AC  3  -SG    Eating meals 4  -AC  4  -SG    Score 20  -AC  19  -SG    Functional Assessment    Outcome Measure Options AM-PAC 6 Clicks Daily Activity (OT)  -AC AM-PAC 6 Clicks Basic Mobility (PT)  -LM AM-PAC 6 Clicks Daily Activity (OT)  -SG      03/21/17 1518          How much help from another person do you currently need...    Turning from your back to your side while in flat bed without using bedrails? 3  -JR      Moving from lying on back to sitting on the side of a flat bed without bedrails? 3  -JR      Moving to and from a bed to a chair (including a wheelchair)? 3  -JR      Standing up from a chair using your arms (e.g., wheelchair, bedside chair)? 3  -JR      Climbing 3-5 steps with a railing? 2  -JR      To walk in hospital room? 3  -JR      AM-PAC 6 Clicks Score 17  -JR      Functional Assessment    Outcome Measure Options AM-PAC 6 Clicks Basic Mobility (PT)  -JR        User Key  (r) = Recorded By, (t) = Taken By, (c) = Cosigned By    Initials Name Provider Type    AC Akilah Quiroga, OT Occupational Therapist    JR Kaylee Kurtz, PT Physical Therapist    LM Xochitl Hernandez, PT Physical Therapist    SG Klaudia Rodrigues Occupational Therapist           Time Calculation:         PT Charges       03/22/17 1024           Time Calculation    Start Time 1352  -LM      PT Received On 03/22/17  -LM      Time Calculation- PT    Total Timed Code Minutes- PT 12 minute(s)  -LM        User Key  (r) = Recorded By, (t) = Taken By, (c) = Cosigned By    Initials Name Provider Type    LM Xochitl Hernandez, PT Physical Therapist          Therapy Charges for Today     Code Description Service Date Service Provider Modifiers Qty    33805435718 HC GAIT TRAINING EA 15 MIN 3/22/2017 Xochitl Hernandez, PT GP 1          PT G-Codes  Outcome Measure Options: AM-PAC 6 Clicks Daily Activity (OT)    Xochitl Hernandez, PT  3/22/2017

## 2017-03-23 VITALS
BODY MASS INDEX: 21.98 KG/M2 | DIASTOLIC BLOOD PRESSURE: 76 MMHG | TEMPERATURE: 98 F | HEART RATE: 68 BPM | HEIGHT: 68 IN | OXYGEN SATURATION: 93 % | SYSTOLIC BLOOD PRESSURE: 151 MMHG | WEIGHT: 145 LBS | RESPIRATION RATE: 16 BRPM

## 2017-03-23 PROBLEM — J44.1 COPD WITH EXACERBATION (HCC): Status: RESOLVED | Noted: 2017-03-18 | Resolved: 2017-03-23

## 2017-03-23 LAB
ANION GAP SERPL CALCULATED.3IONS-SCNC: 7.5 MMOL/L
BASOPHILS # BLD AUTO: 0.01 10*3/MM3 (ref 0–0.2)
BASOPHILS NFR BLD AUTO: 0.1 % (ref 0–2.5)
BUN BLD-MCNC: 28 MG/DL (ref 7–20)
BUN/CREAT SERPL: 31.1 (ref 6.3–21.9)
CALCIUM SPEC-SCNC: 8.5 MG/DL (ref 8.4–10.2)
CHLORIDE SERPL-SCNC: 103 MMOL/L (ref 98–107)
CO2 SERPL-SCNC: 35 MMOL/L (ref 26–30)
CREAT BLD-MCNC: 0.9 MG/DL (ref 0.6–1.3)
DEPRECATED RDW RBC AUTO: 38.9 FL (ref 37–54)
EOSINOPHIL # BLD AUTO: 0 10*3/MM3 (ref 0–0.7)
EOSINOPHIL NFR BLD AUTO: 0 % (ref 0–7)
ERYTHROCYTE [DISTWIDTH] IN BLOOD BY AUTOMATED COUNT: 11.4 % (ref 11.5–14.5)
GFR SERPL CREATININE-BSD FRML MDRD: 84 ML/MIN/1.73
GLUCOSE BLD-MCNC: 99 MG/DL (ref 74–98)
GLUCOSE BLDC GLUCOMTR-MCNC: 103 MG/DL (ref 70–130)
GLUCOSE BLDC GLUCOMTR-MCNC: 122 MG/DL (ref 70–130)
HCT VFR BLD AUTO: 36.8 % (ref 42–52)
HGB BLD-MCNC: 12.1 G/DL (ref 14–18)
IMM GRANULOCYTES # BLD: 0.04 10*3/MM3 (ref 0–0.06)
IMM GRANULOCYTES NFR BLD: 0.4 % (ref 0–0.6)
LYMPHOCYTES # BLD AUTO: 0.92 10*3/MM3 (ref 0.6–3.4)
LYMPHOCYTES NFR BLD AUTO: 10 % (ref 10–50)
MCH RBC QN AUTO: 30.6 PG (ref 27–31)
MCHC RBC AUTO-ENTMCNC: 32.9 G/DL (ref 30–37)
MCV RBC AUTO: 92.9 FL (ref 80–94)
MONOCYTES # BLD AUTO: 0.66 10*3/MM3 (ref 0–0.9)
MONOCYTES NFR BLD AUTO: 7.1 % (ref 0–12)
NEUTROPHILS # BLD AUTO: 7.61 10*3/MM3 (ref 2–6.9)
NEUTROPHILS NFR BLD AUTO: 82.4 % (ref 37–80)
NRBC BLD MANUAL-RTO: 0 /100 WBC (ref 0–0)
PLATELET # BLD AUTO: 171 10*3/MM3 (ref 130–400)
PMV BLD AUTO: 9.4 FL (ref 6–12)
POTASSIUM BLD-SCNC: 4.5 MMOL/L (ref 3.5–5.1)
RBC # BLD AUTO: 3.96 10*6/MM3 (ref 4.7–6.1)
SODIUM BLD-SCNC: 141 MMOL/L (ref 137–145)
WBC NRBC COR # BLD: 9.24 10*3/MM3 (ref 4.8–10.8)

## 2017-03-23 PROCEDURE — 85025 COMPLETE CBC W/AUTO DIFF WBC: CPT | Performed by: INTERNAL MEDICINE

## 2017-03-23 PROCEDURE — 25010000002 METHYLPREDNISOLONE PER 40 MG: Performed by: INTERNAL MEDICINE

## 2017-03-23 PROCEDURE — 94799 UNLISTED PULMONARY SVC/PX: CPT

## 2017-03-23 PROCEDURE — 99239 HOSP IP/OBS DSCHRG MGMT >30: CPT | Performed by: INTERNAL MEDICINE

## 2017-03-23 PROCEDURE — 97110 THERAPEUTIC EXERCISES: CPT

## 2017-03-23 PROCEDURE — 82962 GLUCOSE BLOOD TEST: CPT

## 2017-03-23 PROCEDURE — 80048 BASIC METABOLIC PNL TOTAL CA: CPT | Performed by: INTERNAL MEDICINE

## 2017-03-23 PROCEDURE — 97116 GAIT TRAINING THERAPY: CPT

## 2017-03-23 PROCEDURE — 25010000002 ENOXAPARIN PER 10 MG: Performed by: INTERNAL MEDICINE

## 2017-03-23 RX ORDER — IPRATROPIUM BROMIDE AND ALBUTEROL SULFATE 2.5; .5 MG/3ML; MG/3ML
3 SOLUTION RESPIRATORY (INHALATION)
Qty: 84 ML | Refills: 0 | Status: SHIPPED | OUTPATIENT
Start: 2017-03-23 | End: 2017-03-30

## 2017-03-23 RX ORDER — PREDNISONE 1 MG/1
5 TABLET ORAL DAILY
Qty: 21 TABLET | Refills: 0 | Status: SHIPPED | OUTPATIENT
Start: 2017-03-23 | End: 2017-04-06

## 2017-03-23 RX ORDER — AMOXICILLIN AND CLAVULANATE POTASSIUM 875; 125 MG/1; MG/1
1 TABLET, FILM COATED ORAL 2 TIMES DAILY
Qty: 14 TABLET | Refills: 0 | Status: SHIPPED | OUTPATIENT
Start: 2017-03-23 | End: 2017-04-06

## 2017-03-23 RX ADMIN — ATORVASTATIN CALCIUM 40 MG: 40 TABLET, FILM COATED ORAL at 08:48

## 2017-03-23 RX ADMIN — ESCITALOPRAM OXALATE 10 MG: 10 TABLET ORAL at 08:47

## 2017-03-23 RX ADMIN — CLOPIDOGREL BISULFATE 75 MG: 75 TABLET ORAL at 08:47

## 2017-03-23 RX ADMIN — IPRATROPIUM BROMIDE AND ALBUTEROL SULFATE 3 ML: .5; 3 SOLUTION RESPIRATORY (INHALATION) at 08:03

## 2017-03-23 RX ADMIN — CARVEDILOL 6.25 MG: 6.25 TABLET, FILM COATED ORAL at 08:48

## 2017-03-23 RX ADMIN — METHYLPREDNISOLONE SODIUM SUCCINATE 40 MG: 40 INJECTION, POWDER, FOR SOLUTION INTRAMUSCULAR; INTRAVENOUS at 06:01

## 2017-03-23 RX ADMIN — TAMSULOSIN HYDROCHLORIDE 0.4 MG: 0.4 CAPSULE ORAL at 08:48

## 2017-03-23 RX ADMIN — ACETAMINOPHEN 650 MG: 325 TABLET ORAL at 13:35

## 2017-03-23 RX ADMIN — ENOXAPARIN SODIUM 40 MG: 40 INJECTION SUBCUTANEOUS at 08:48

## 2017-03-23 NOTE — THERAPY DISCHARGE NOTE
Acute Care - Physical Therapy Discharge Summary  [facility]       Patient Name: Thurman Mendel Parsons  : 1950  MRN: [id number]    Today's Date: 3/23/2017  Onset of Illness/Injury or Date of Surgery Date: 17    Date of Referral to PT: 17  Referring Physician: Dr Davis      Admit Date: 3/18/2017      PT Recommendation and Plan    Visit Dx:    ICD-10-CM ICD-9-CM   1. COPD with exacerbation J44.1 491.21   2. Impaired mobility and ADLs Z74.09 799.89   3. Impaired functional mobility, balance, gait, and endurance Z74.09 V49.89             Outcome Measures       17 1021 17 1353 17 1352    How much help from another person do you currently need...    Turning from your back to your side while in flat bed without using bedrails? 4  -LM  4  -LM    Moving from lying on back to sitting on the side of a flat bed without bedrails? 3  -LM  3  -LM    Moving to and from a bed to a chair (including a wheelchair)? 3  -LM  3  -LM    Standing up from a chair using your arms (e.g., wheelchair, bedside chair)? 3  -LM  3  -LM    Climbing 3-5 steps with a railing? 2  -LM  2  -LM    To walk in hospital room? 3  -LM  3  -LM    AM-PAC 6 Clicks Score 18  -LM  18  -LM    How much help from another is currently needed...    Putting on and taking off regular lower body clothing?  3  -AC     Bathing (including washing, rinsing, and drying)  3  -AC     Toileting (which includes using toilet bed pan or urinal)  3  -AC     Putting on and taking off regular upper body clothing  3  -AC     Taking care of personal grooming (such as brushing teeth)  4  -AC     Eating meals  4  -AC     Score  20  -AC     Functional Assessment    Outcome Measure Options AM-PAC 6 Clicks Basic Mobility (PT)  -LM AM-PAC 6 Clicks Daily Activity (OT)  -AC AM-PAC 6 Clicks Basic Mobility (PT)  -LM      17 1520 17 1518       How much help from another person do you currently need...    Turning from your back to your side while in  flat bed without using bedrails?  3  -JR     Moving from lying on back to sitting on the side of a flat bed without bedrails?  3  -JR     Moving to and from a bed to a chair (including a wheelchair)?  3  -JR     Standing up from a chair using your arms (e.g., wheelchair, bedside chair)?  3  -JR     Climbing 3-5 steps with a railing?  2  -JR     To walk in hospital room?  3  -JR     AM-PAC 6 Clicks Score  17  -JR     How much help from another is currently needed...    Putting on and taking off regular lower body clothing? 3  -SG      Bathing (including washing, rinsing, and drying) 3  -SG      Toileting (which includes using toilet bed pan or urinal) 3  -SG      Putting on and taking off regular upper body clothing 3  -SG      Taking care of personal grooming (such as brushing teeth) 3  -SG      Eating meals 4  -SG      Score 19  -SG      Functional Assessment    Outcome Measure Options AM-PAC 6 Clicks Daily Activity (OT)  -SG AM-PAC 6 Clicks Basic Mobility (PT)  -JR       User Key  (r) = Recorded By, (t) = Taken By, (c) = Cosigned By    Initials Name Provider Type    AC Akilah Quiroga, OT Occupational Therapist    JR Kaylee Kurtz, PT Physical Therapist    FRANCESCO Hernandez, PT Physical Therapist    SG Klaudia Rodrigues Occupational Therapist                PT Charges       03/23/17 1304          Time Calculation    Start Time 1021  -LM      PT Received On 03/23/17  -LM      Time Calculation- PT    Total Timed Code Minutes- PT 24 minute(s)  -LM        User Key  (r) = Recorded By, (t) = Taken By, (c) = Cosigned By    Initials Name Provider Type    FRANCESCO Hernandez, PT Physical Therapist                  IP PT Goals       03/23/17 1527 03/23/17 1301 03/22/17 1545    Bed Mobility PT LTG    Bed Mobility PT LTG, Outcome  goal met  -LM goal ongoing  -LM    Transfer Training PT LTG    Transfer Training PT LTG, Outcome goal partially met  -LM goal ongoing  -LM goal ongoing  -LM    Transfer Training PT LTG, Reason Goal Not Met  discharged from facility  -LM      Gait Training PT LTG    Gait Training Goal PT LTG, Outcome goal partially met  -LM goal ongoing  -LM goal ongoing  -LM    Gait Training Goal PT LTG, Reason Goal Not Met discharged from facility  -LM        03/21/17 1857          Bed Mobility PT LTG    Bed Mobility PT LTG, Date Established 03/21/17  -JR      Bed Mobility PT LTG, Time to Achieve 1 wk  -JR      Bed Mobility PT LTG, Activity Type all bed mobility  -JR      Bed Mobility PT LTG, [place] Level conditional independence  -JR      Bed Mobility PT Goal  LTG, Assist Device bed rails  -JR      Bed Mobility PT LTG, Date Goal Reviewed 03/21/17  -JR      Bed Mobility PT LTG, Outcome goal ongoing  -JR      Transfer Training PT LTG    Transfer Training PT LTG, Date Established 03/21/17  -JR      Transfer Training PT LTG, Time to Achieve 1 wk  -JR      Transfer Training PT LTG, Activity Type bed to chair /chair to bed;sit to stand/stand to sit  -JR      Transfer Training PT LTG, [place] Level conditional independence  -JR      Transfer Training PT LTG, Assist Device walker, rolling  -JR      Transfer Training PT  LTG, Date Goal Reviewed 03/21/17  -JR      Transfer Training PT LTG, Outcome goal ongoing  -JR      Gait Training PT LTG    Gait Training Goal PT LTG, Date Established 03/21/17  -JR      Gait Training Goal PT LTG, Time to Achieve 1 wk  -JR      Gait Training Goal PT LTG, [place] Level conditional independence  -JR      Gait Training Goal PT LTG, Assist Device walker, rolling  -JR      Gait Training Goal PT LTG, Distance to Achieve 250  -JR      Gait Training Goal PT LTG, Date Goal Reviewed 03/21/17  -JR      Gait Training Goal PT LTG, Outcome goal ongoing  -JR        User Key  (r) = Recorded By, (t) = Taken By, (c) = Cosigned By    Initials Name Provider Type    JR Kaylee Kurtz, PT Physical Therapist    LM Xochitl Hernandez, PT Physical Therapist          Therapy Charges for Today     Code Description Service Date  Service Provider Modifiers Qty    34673385510 HC GAIT TRAINING EA 15 MIN 3/22/2017 Xochitl Hernandez, PT GP 1    57191438975 HC GAIT TRAINING EA 15 MIN 3/23/2017 Xochitl Hernandez, PT GP 1    64627718247 HC PT THER PROC EA 15 MIN 3/23/2017 Xochitl Hernandez, PT GP 1          PT Discharge Summary  Anticipated Discharge Disposition: inpatient rehabilitation facility  Reason for Discharge: Discharge from facility  Outcomes Achieved: Refer to plan of care for updates on goals achieved  Discharge Destination: Inpatient rehabilitation facility      Xochitl Hernandez, PT   3/23/2017

## 2017-03-23 NOTE — DISCHARGE SUMMARY
[facility]   DISCHARGE SUMMARY      Name:  Thurman Mendel Parsons   Age:  66 y.o.  Sex:  male  :  1950  MRN:  [id number]   Visit Number:  [id number]  Primary Care Physician:  Miguel Rojas MD  Date of Discharge:  3/23/2017  Admission Date:  3/18/2017      Discharge Diagnosis:     Patient Active Problem List   Diagnosis   • Anxiety   • Atherosclerotic heart disease of native coronary artery without angina pectoris   • Atrial fibrillation   • Chronic bronchitis   • Chronic kidney disease, stage III (moderate)   • Steroid-dependent COPD   • Degeneration of cervical intervertebral disc   • Diabetes mellitus   • GERD without esophagitis   • Diverticulosis   • Hemorrhoids   • Hiatal hernia   • Hyperlipidemia   • Hypertension   • Kyphosis, acquired   • Mitral and aortic valve disease   • Phimosis   • Sleep apnea   • Enlarged prostate without lower urinary tract symptoms (luts)   • History of arthritis   • Back pain   • Cardiac pain   • Depression   • Hematuria   • Stroke syndrome   • History of ventricular septal defect         Consults:     Consults     Date and Time Order Name Status Description    3/21/2017 0753 Inpatient Consult to Pulmonology Completed           Procedures Performed: NONE               Hospital Course:  The patient was admitted on 3/18/2017, please see H&P for further details of HPI and ROS.    Patient is a 66-year-old gentleman, past medical history of DMII, with neuropathy, A. fib, chronic kidney disease, COPD chronically on 4 LNC, PEDRO, prior CVA, GERD, HTN/HLD who was admitted to the ED for acute SOA X 1-2 days with productive greenish sputum.  He was found to be hypoxic in the 80's.  ABG showed PCO2 of 70. CXR showed a LLL pneumonia.  Flu screen was negative. Patient was initiated nebs, IV steroids, O2, and IV Rocephin and Zithromax. Patient had an overnight cardiorespiratory sleep oximetry consistent with PEDRO.  Dr. Lopez recommended Bipap  with in line  O2 of 4 liters at night or when takes naps with chronic O2 otherwise of 4 LNC.  Patient doing well today.  His soa has continued to improve as well as his ABG's.  He reports no cp, n/v or abdominal pain.  He however still has generalized fatigue and weakness.  Patient is stable for DC to rehab today for further gait strengthening.  FS QAC and QHS with SSI coverage.  Patient is to restart outpatient pulmonary rehab once finished with inpatient rehab.   Rehab MD to acquire and f/u with CBC and BMP q 3 days X 2 weeks.     Vital Signs:    Temp:  [97.4 °F (36.3 °C)-98.9 °F (37.2 °C)] 97.7 °F (36.5 °C)  Heart Rate:  [59-73] 59  Resp:  [16-20] 18  BP: (134-148)/(62-84) 134/66          Physical Examination:    General Appearance:    Alert and cooperative, not in any acute distress.   Head:    Atraumatic and normocephalic, without obvious abnormality.   Eyes:        PERRLA, Extra-occular movements are within normal limits.    Lungs:     Scattered diffuse expiratory wheezing in all lung fields.     Heart:    Normal S1 and S2, no murmur, no gallop, no rub.   Abdomen:     Normal bowel sounds,  Soft non-tender, non-distended, no guarding, no rebound tenderness   Extremities:   Moves all extremities well, no edema             Skin:   No bruising or rash.   Neurologic:  Grossly non focal and moves all extremities equally.          Pertinent Lab Results:     Lab Results (last 24 hours)     Procedure Component Value Units Date/Time    POC Glucose Fingerstick [[id number]]  (Abnormal) Collected:  03/22/17 1119    Specimen:  Blood Updated:  03/22/17 1355     Glucose 138 (H) mg/dL       Serial Number: [id number]    : [id number]       POC Glucose Fingerstick [[id number]]  (Abnormal) Collected:  03/22/17 1637    Specimen:  Blood Updated:  03/22/17 1700     Glucose 135 (H) mg/dL       Serial Number: [id number]    : [id number]       POC Glucose Fingerstick [[id number]]  (Normal) Collected:  03/22/17 2043    Specimen:  Blood Updated:   03/22/17 2051     Glucose 117 mg/dL       Serial Number: [id number]    : [id number]       CBC & Differential [[id number]] Collected:  03/23/17 0601    Specimen:  Blood Updated:  03/23/17 0619    Narrative:       The following orders were created for panel order CBC & Differential.  Procedure                               Abnormality         Status                     ---------                               -----------         ------                     CBC Auto Differential[[id number]]         Abnormal            Final result                 Please view results for these tests on the individual orders.    CBC Auto Differential [[id number]]  (Abnormal) Collected:  03/23/17 0601    Specimen:  Blood Updated:  03/23/17 0619     WBC 9.24 10*3/mm3      RBC 3.96 (L) 10*6/mm3      Hemoglobin 12.1 (L) g/dL      Hematocrit 36.8 (L) %      MCV 92.9 fL      MCH 30.6 pg      MCHC 32.9 g/dL      RDW 11.4 (L) %      RDW-SD 38.9 fl      MPV 9.4 fL      Platelets 171 10*3/mm3      Neutrophil % 82.4 (H) %      Lymphocyte % 10.0 %      Monocyte % 7.1 %      Eosinophil % 0.0 %      Basophil % 0.1 %      Immature Grans % 0.4 %      Neutrophils, Absolute 7.61 (H) 10*3/mm3      Lymphocytes, Absolute 0.92 10*3/mm3      Monocytes, Absolute 0.66 10*3/mm3      Eosinophils, Absolute 0.00 10*3/mm3      Basophils, Absolute 0.01 10*3/mm3      Immature Grans, Absolute 0.04 10*3/mm3      nRBC 0.0 /100 WBC     Basic Metabolic Panel [[id number]]  (Abnormal) Collected:  03/23/17 0601    Specimen:  Blood Updated:  03/23/17 0651     Glucose 99 (H) mg/dL      BUN 28 (H) mg/dL      Creatinine 0.90 mg/dL      Sodium 141 mmol/L      Potassium 4.5 mmol/L      Chloride 103 mmol/L      CO2 35.0 (H) mmol/L      Calcium 8.5 mg/dL      eGFR Non African Amer 84 mL/min/1.73      BUN/Creatinine Ratio 31.1 (H)     Anion Gap 7.5 mmol/L     Narrative:       Abnormal estimated GFR should be followed by more specific studies to confirm end stage chronic renal disease. The  equation used for calculation may not be accurate for patients less than 19 years old, greater than 70 years old, patients at extremes of weight, malnutrition, or with acute renal dysfunction.    POC Glucose Fingerstick [[id number]]  (Normal) Collected:  03/23/17 0602    Specimen:  Blood Updated:  03/23/17 0800     Glucose 103 mg/dL       Serial Number: [id number]    : [id number]       POC Glucose Fingerstick [[id number]]  (Normal) Collected:  03/23/17 1037    Specimen:  Blood Updated:  03/23/17 1106     Glucose 122 mg/dL       Serial Number: [id number]    : [id number]             Pertinent Radiology Results:  Imaging Results (most recent)     Procedure Component Value Units Date/Time    XR Chest 1 View [[id number]] Collected:  03/19/17 0834     Updated:  03/19/17 0837    Narrative:       PROCEDURE: XR CHEST 1 VW-     HISTORY: SOA  triage protocol.     COMPARISON: December 31, 2012.      FINDINGS:    . Cardiomegaly is noted. The mediastinum is unremarkable.  There is a presumed stimulator which overlies the right thorax. The  lungs are somewhat hyperinflated likely representing COPD. There are  left lung base opacities which may represent atelectasis or pneumonia.  There is no pneumothorax. The bony thorax in intact.           Impression:       COPD.     Left lung base opacities consistent with atelectasis or pneumonia.        This report was finalized on 3/19/2017 8:35 AM by Jorge Luis Hanna MD.            Discharge Disposition:    Rehab Facility or Unit (DC - External)    Discharge Medication:     [facility] Home Medication Instructions FEI:[id number]    Printed on:03/23/17 1230   Medication Information                      albuterol (PROVENTIL) (2.5 MG/3ML) 0.083% nebulizer solution  Albuterol Sulfate (2.5 MG/3ML) 0.083% Inhalation Nebulization Solution; Patient Sig: Albuterol Sulfate (2.5 MG/3ML) 0.083% Inhalation Nebulization Solution USE 1 UNIT DOSE EVERY 4-6 HOURS AS NEEDED FOR WHEEZING .;  3; 5; 04-Sep-2012; Active             alfuzosin (UROXATRAL) 10 MG 24 hr tablet  TAKE ONE TABLET BY MOUTH EVERY MORNING             ALPRAZolam (XANAX) 0.5 MG tablet  Take 1 tablet by mouth At Night As Needed for anxiety.             amLODIPine (NORVASC) 5 MG tablet  TAKE ONE TABLET BY MOUTH DAILY             amoxicillin-clavulanate (AUGMENTIN) 875-125 MG per tablet  Take 1 tablet by mouth 2 (Two) Times a Day.             aspirin 81 MG tablet  Take 81 mg by mouth daily.             atorvastatin (LIPITOR) 40 MG tablet  Take 1 tablet by mouth Daily.             BREO ELLIPTA 200-25 MCG/INH aerosol powder                carvedilol (COREG) 6.25 MG tablet  Take 1 tablet by mouth 2 (Two) Times a Day With Meals.             clopidogrel (PLAVIX) 75 MG tablet  Take 1 tablet by mouth Daily.             cyclobenzaprine (FLEXERIL) 10 MG tablet  Take 5 mg by mouth 2 (Two) Times a Day As Needed.             escitalopram (LEXAPRO) 10 MG tablet  Take 1 tablet by mouth Daily.             famotidine (PEPCID) 20 MG tablet  TAKE ONE TABLET BY MOUTH DAILY ** NEED TO CALL PRIMARY DOCTOR FOR REFILLS             fluticasone (FLONASE) 50 MCG/ACT nasal spray               ipratropium-albuterol (DUO-NEB) 0.5-2.5 mg/mL nebulizer  Take 3 mL by nebulization 4 (Four) Times a Day for 7 days.             iron polysaccharides (POLY-IRON 150) 150 MG capsule  Take  by mouth daily.             losartan (COZAAR) 100 MG tablet  Take 1 tablet by mouth daily.             nitroglycerin (NITROSTAT) 0.4 MG SL tablet  Place  under the tongue.             oxybutynin (DITROPAN) 5 MG tablet  TAKE ONE TABLET BY MOUTH AT BEDTIME             predniSONE (DELTASONE) 10 MG tablet  Take 10 mg by mouth Daily.             predniSONE (DELTASONE) 5 MG tablet  Take 1 tablet by mouth Daily.             PROAIR  (90 BASE) MCG/ACT inhaler                   Discharge Diet:     Diet Instructions     Diet: Cardiac; Thin Liquids, No Restrictions       Discharge Diet:  Cardiac    Fluid Consistency:  Thin Liquids, No Restrictions                 Activity at Discharge:     Activity Instructions     Activity as Tolerated                     Follow-up Appointments:    Follow-up Information     Follow up with Miguel Rojas MD Follow up on 4/6/2017.    Specialty:  Internal Medicine    Why:  CBC and BMP check and f/u with PCP in two weeks.  @9:30    Contact information:    [address] [place] [address] [phone]              Code Status: Full           Edward Davis MD  03/23/17  12:30 PM    Time: Discharge 36 min

## 2017-03-23 NOTE — PLAN OF CARE
Problem: Patient Care Overview (Adult)  Goal: Plan of Care Review  Outcome: Ongoing (interventions implemented as appropriate)    Problem: Inpatient Physical Therapy  Goal: Bed Mobility Goal LTG- PT  Outcome: Outcome(s) achieved Date Met:  03/23/17 03/21/17 1857 03/23/17 1301   Bed Mobility PT LTG   Bed Mobility PT LTG, Date Established 03/21/17 --    Bed Mobility PT LTG, Time to Achieve 1 wk --    Bed Mobility PT LTG, Activity Type all bed mobility --    Bed Mobility PT LTG, [place] Level conditional independence --    Bed Mobility PT Goal LTG, Assist Device bed rails --    Bed Mobility PT LTG, Date Goal Reviewed 03/21/17 --    Bed Mobility PT LTG, Outcome --  goal met       Goal: Transfer Training Goal 1 LTG- PT  Outcome: Ongoing (interventions implemented as appropriate)    03/21/17 1857 03/23/17 1301   Transfer Training PT LTG   Transfer Training PT LTG, Date Established 03/21/17 --    Transfer Training PT LTG, Time to Achieve 1 wk --    Transfer Training PT LTG, Activity Type bed to chair /chair to bed;sit to stand/stand to sit --    Transfer Training PT LTG, [place] Level conditional independence --    Transfer Training PT LTG, Assist Device walker, rolling --    Transfer Training PT LTG, Date Goal Reviewed 03/21/17 --    Transfer Training PT LTG, Outcome --  goal ongoing       Goal: Gait Training Goal LTG- PT  Outcome: Ongoing (interventions implemented as appropriate)    03/21/17 1857 03/23/17 1301   Gait Training PT LTG   Gait Training Goal PT LTG, Date Established 03/21/17 --    Gait Training Goal PT LTG, Time to Achieve 1 wk --    Gait Training Goal PT LTG, [place] Level conditional independence --    Gait Training Goal PT LTG, Assist Device walker, rolling --    Gait Training Goal PT LTG, Distance to Achieve 250 --    Gait Training Goal PT LTG, Date Goal Reviewed 03/21/17 --    Gait Training Goal PT LTG, Outcome --  goal ongoing

## 2017-03-23 NOTE — NURSING NOTE
LACE teaching pt able to give teachback regarding when to call the doctor or come to ER with what signs and symptoms and give return demonstration of purse lip breathing

## 2017-03-23 NOTE — PROGRESS NOTES
Continued Stay Note   [facility]     Patient Name: Thurman Mendel Parsons  MRN: [id number]  Today's Date: 3/23/2017    Admit Date: 3/18/2017          Discharge Plan       03/23/17 1449    Case Management/Social Work Plan    Plan Spoke with Dr Lopez this  who reported pt could benefit from short term rehab.  Pt will need BiPAP.  Stated pt able to DC today.  Spoke with Sylvia at [facility] to update.  Dr Lopez's note with BiPAP setting and PT note faxed to [place].    Additional Comments Spoke with pt who was agreeable to short term rehab.              Discharge Codes     None        Expected Discharge Date and Time     Expected Discharge Date Expected Discharge Time    Mar 23, 2017             Michaela Snowden

## 2017-03-23 NOTE — PROGRESS NOTES
Acute Care - Physical Therapy Treatment Note   [facility]     Patient Name: Thurman Mendel Parsons  : 1950  MRN: [id number]  Today's Date: 3/23/2017  Onset of Illness/Injury or Date of Surgery Date: 17  Date of Referral to PT: 17  Referring Physician: Dr Davis    Admit Date: 3/18/2017    Visit Dx:    ICD-10-CM ICD-9-CM   1. COPD with exacerbation J44.1 491.21   2. Impaired mobility and ADLs Z74.09 799.89   3. Impaired functional mobility, balance, gait, and endurance Z74.09 V49.89     Patient Active Problem List   Diagnosis   • Anxiety   • Atherosclerotic heart disease of native coronary artery without angina pectoris   • Atrial fibrillation   • Chronic bronchitis   • Chronic kidney disease, stage III (moderate)   • Steroid-dependent COPD   • Degeneration of cervical intervertebral disc   • Diabetes mellitus   • GERD without esophagitis   • Diverticulosis   • Hemorrhoids   • Hiatal hernia   • Hyperlipidemia   • Hypertension   • Kyphosis, acquired   • Mitral and aortic valve disease   • Phimosis   • Sleep apnea   • Enlarged prostate without lower urinary tract symptoms (luts)   • History of arthritis   • Back pain   • Cardiac pain   • Depression   • Hematuria   • Stroke syndrome   • History of ventricular septal defect               Adult Rehabilitation Note       17 1021 17 1353 17 1352    Rehab Assessment/Intervention    Discipline physical therapist  -LM occupational therapist  -AC physical therapist  -LM    Document Type therapy note (daily note)  -LM therapy note (daily note)  -AC therapy note (daily note)  -LM    Subjective Information agree to therapy  -LM agree to therapy  -AC agree to therapy  -LM    Patient Effort, Rehab Treatment good  -LM good  -AC good  -LM    Symptoms Noted During/After Treatment fatigue;shortness of breath  -LM shortness of breath  -AC shortness of breath  -LM    Precautions/Limitations fall precautions;oxygen therapy device and L/min  -LM fall  precautions;oxygen therapy device and L/min  -AC fall precautions;oxygen therapy device and L/min  -LM    Recorded by [LM] Xochitl Hernandez, PT [AC] Akilah Quiroga, OT [LM] Xochitl Hernandez, PT    Vital Signs    Pre SpO2 (%) 93  -LM 96  -AC 96  -LM    O2 Delivery Pre Treatment supplemental O2   3 LPM  -LM supplemental O2  -AC supplemental O2  -LM    Intra SpO2 (%)  92  -AC 92  -LM    O2 Delivery Intra Treatment  supplemental O2  -AC supplemental O2  -LM    Post SpO2 (%) 91  -LM 93  -AC 93  -LM    O2 Delivery Post Treatment supplemental O2   3 LPM   -LM supplemental O2  -AC supplemental O2  -LM    Recorded by [LM] Xochitl Hernandez, PT [AC] Akilah Quiroga, OT [LM] Xochitl Hernandez, PT    Pain Assessment    Pain Assessment No/denies pain  -LM No/denies pain  -AC No/denies pain  -LM    Recorded by [LM] Xochitl Hernandez, PT [AC] Akilah Quiroga, OT [LM] Xochitl Hernandez, PT    Cognitive Assessment/Intervention    Current Cognitive/Communication Assessment  functional  -AC functional  -LM    Orientation Status  oriented to;person;place  -AC     Follows Commands/Answers Questions  able to follow single-step instructions  -AC     Personal Safety  WNL/WFL  -AC     Personal Safety Interventions  fall prevention program maintained;gait belt;nonskid shoes/slippers when out of bed  -AC     Recorded by  [AC] Akilah Quiroga, OT [LM] Xochitl Hernandez, PT    Bed Mobility, Assessment/Treatment    Bed Mob, Supine to Sit, [place] supervision required  -LM supervision required  -AC supervision required  -LM    Bed Mob, Sit to Supine, [place]  supervision required  -AC supervision required  -LM    Recorded by [LM] Xochitl Hernandez, PT [AC] Akilah Quiroga, OT [LM] Xochitl Hernandez, PT    Transfer Assessment/Treatment    Transfers, Bed-Chair [place] contact guard assist  -LM      Transfers, Bed-Chair-Bed, Assist Device rolling walker  -LM      Transfers, Sit-Stand [place] contact guard assist  -LM contact guard assist  -AC contact guard assist  -LM     Transfers, Stand-Sit [place] contact guard assist  -LM contact guard assist  -AC contact guard assist  -LM    Transfers, Sit-Stand-Sit, Assist Device rolling walker  -LM --   gait belt and UE support  -AC     Transfer, Impairments strength decreased;impaired balance  -LM      Recorded by [LM] Xochitl Hernandez, PT [AC] Akilah Quiroga OT [LM] Xochitl Hernandez, PT    Gait Assessment/Treatment    Gait, [place] Level contact guard assist  -LM  contact guard assist  -LM    Gait, Assistive Device rolling walker  -LM      Gait, Distance (Feet) 168  -LM  188  -LM    Gait, Safety Issues balance decreased during turns;sequencing ability decreased;supplemental O2  -LM      Gait, Impairments strength decreased;impaired balance  -LM      Gait, Comment   trial RW  -LM    Recorded by [LM] Xochitl Hernandez PT  [LM] Xochitl Hernandez, PT    Functional Mobility    Functional Mobility- Ind. Level  contact guard assist  -AC     Functional Mobility- Device  --   gait belt and UE support  -AC     Functional Mobility-Distance (Feet)  188  -AC     Functional Mobility- Safety Issues  balance decreased during turns  -AC     Recorded by  [AC] Akilah Quiroga OT     Lower Body Dressing Assessment/Training    LB Dressing Assess/Train, Comment  Pt was able to bring foot up to knee and adjust socks to where  were on the bottom  -AC     Recorded by  [AC] Akilah Quiroga OT     Therapy Exercises    Bilateral Lower Extremities AROM:;10 reps;sitting;ankle pumps/circles;hip abduction/adduction;LAQ;other reps   seated march  -  AROM:;10 reps;sitting;ankle pumps/circles;LAQ   Seated [facility]  -    Bilateral Upper Extremity  AROM:;10 reps;elbow flexion/extension;shoulder extension/flexion;shoulder horizontal abd/add  -AC     Recorded by [LM] Xochitl Hernandez, PT [AC] Akilah Quiroga OT [LM] Xochitl Hernandez, PT    Positioning and Restraints    Pre-Treatment Position in bed  -LM in bed  -AC in bed  -LM    Post Treatment Position chair  -LM bed  -AC bed  -LM    In  Bed  supine;call light within reach;encouraged to call for assist  -AC supine;call light within reach;encouraged to call for assist  -LM    In Chair reclined;call light within reach;encouraged to call for assist  -LM      Recorded by [LM] Xochitl Hernandez, PT [AC] Akilah Quiroga, OT [LM] Xochitl Hernandez, PT      User Key  (r) = Recorded By, (t) = Taken By, (c) = Cosigned By    Initials Name Effective Dates    AC Akilah Quiroga, OT 06/23/15 -     LM Xochitl Hernandez, PT 10/26/16 -                 IP PT Goals       03/23/17 1301 03/22/17 1545 03/21/17 1857    Bed Mobility PT LTG    Bed Mobility PT LTG, Date Established   03/21/17  -JR    Bed Mobility PT LTG, Time to Achieve   1 wk  -JR    Bed Mobility PT LTG, Activity Type   all bed mobility  -JR    Bed Mobility PT LTG, [place] Level   conditional independence  -JR    Bed Mobility PT Goal  LTG, Assist Device   bed rails  -JR    Bed Mobility PT LTG, Date Goal Reviewed   03/21/17  -JR    Bed Mobility PT LTG, Outcome goal met  -LM goal ongoing  -LM goal ongoing  -JR    Transfer Training PT LTG    Transfer Training PT LTG, Date Established   03/21/17  -JR    Transfer Training PT LTG, Time to Achieve   1 wk  -JR    Transfer Training PT LTG, Activity Type   bed to chair /chair to bed;sit to stand/stand to sit  -JR    Transfer Training PT LTG, [place] Level   conditional independence  -JR    Transfer Training PT LTG, Assist Device   walker, rolling  -JR    Transfer Training PT  LTG, Date Goal Reviewed   03/21/17  -JR    Transfer Training PT LTG, Outcome goal ongoing  -LM goal ongoing  -LM goal ongoing  -JR    Gait Training PT LTG    Gait Training Goal PT LTG, Date Established   03/21/17  -JR    Gait Training Goal PT LTG, Time to Achieve   1 wk  -JR    Gait Training Goal PT LTG, [place] Level   conditional independence  -JR    Gait Training Goal PT LTG, Assist Device   walker, rolling  -JR    Gait Training Goal PT LTG, Distance to Achieve   250  -JR    Gait Training Goal PT  LTG, Date Goal Reviewed   03/21/17  -JR    Gait Training Goal PT LTG, Outcome goal ongoing  -LM goal ongoing  -LM goal ongoing  -JR      User Key  (r) = Recorded By, (t) = Taken By, (c) = Cosigned By    Initials Name Provider Type    JR Kaylee Kurtz, PT Physical Therapist    LM Xochitl Hernandez, PT Physical Therapist          Physical Therapy Education     Title: PT OT SLP Therapies (Active)     Topic: Physical Therapy (Done)     Point: Mobility training (Done)    Learning Progress Summary    Learner Readiness Method Response Comment Documented by Status   Patient Acceptance E VU [place] use of RW for safe transfers/ambulation.  PLB to alleviate SOA.  03/23/17 1301 Done    Acceptance E VU PLB to alleviate SOA; ther ex for HEP; trial RW for safe transfers/ambulation-increased endurance.  03/22/17 1545 Done    Acceptance E VU Discussed physical needs and benefits of inpatient rehab  03/21/17 1849 Done   Family Acceptance E VU Discussed physical needs and benefits of inpatient rehab  03/21/17 1849 Done               Point: Home exercise program (Done)    Learning Progress Summary    Learner Readiness Method Response Comment Documented by Status   Patient Acceptance E VU [place] use of RW for safe transfers/ambulation.  PLB to alleviate SOA.  03/23/17 1301 Done    Acceptance E VU PLB to alleviate SOA; ther ex for HEP; trial RW for safe transfers/ambulation-increased endurance.  03/22/17 1545 Done    Acceptance E VU Discussed physical needs and benefits of inpatient rehab JR 03/21/17 1849 Done   Family Acceptance E VU Discussed physical needs and benefits of inpatient rehab  03/21/17 1849 Done               Point: Body mechanics (Done)    Learning Progress Summary    Learner Readiness Method Response Comment Documented by Status   Patient Acceptance E VU Discussed physical needs and benefits of inpatient rehab JR 03/21/17 1849 Done   Family Acceptance E VU Discussed physical needs and benefits of inpatient rehab   03/21/17 1849 Done               Point: Precautions (Done)    Learning Progress Summary    Learner Readiness Method Response Comment Documented by Status   Patient Acceptance E ROBERT Discussed physical needs and benefits of inpatient rehab  03/21/17 1849 Done   Family Acceptance E VU Discussed physical needs and benefits of inpatient rehab  03/21/17 1849 Done                      User Key     Initials Effective Dates Name Provider Type Discipline     10/26/16 -  Kaylee Kurtz, PT Physical Therapist PT     10/26/16 -  Xochitl Hernandez, PT Physical Therapist PT                    PT Recommendation and Plan  Anticipated Equipment Needs At Discharge: front wheeled walker  Anticipated Discharge Disposition: inpatient rehabilitation facility  Planned Therapy Interventions: strengthening, balance training, transfer training, bed mobility training, gait training, patient/family education  PT Frequency: daily  Plan of Care Review  Plan Of Care Reviewed With: patient  Progress: improving  Outcome Summary/Follow up Plan: Patient with better balance with RW.  Cont to goals.          Outcome Measures       03/23/17 1021 03/22/17 1353 03/22/17 1352    How much help from another person do you currently need...    Turning from your back to your side while in flat bed without using bedrails? 4  -LM  4  -LM    Moving from lying on back to sitting on the side of a flat bed without bedrails? 3  -LM  3  -LM    Moving to and from a bed to a chair (including a wheelchair)? 3  -LM  3  -LM    Standing up from a chair using your arms (e.g., wheelchair, bedside chair)? 3  -LM  3  -LM    Climbing 3-5 steps with a railing? 2  -LM  2  -LM    To walk in hospital room? 3  -LM  3  -LM    AM-PAC 6 Clicks Score 18  -LM  18  -LM    How much help from another is currently needed...    Putting on and taking off regular lower body clothing?  3  -AC     Bathing (including washing, rinsing, and drying)  3  -AC     Toileting (which includes using toilet  bed pan or urinal)  3  -AC     Putting on and taking off regular upper body clothing  3  -AC     Taking care of personal grooming (such as brushing teeth)  4  -AC     Eating meals  4  -AC     Score  20  -AC     Functional Assessment    Outcome Measure Options AM-PAC 6 Clicks Basic Mobility (PT)  -LM AM-PAC 6 Clicks Daily Activity (OT)  -AC AM-PAC 6 Clicks Basic Mobility (PT)  -LM      03/21/17 1520 03/21/17 1518       How much help from another person do you currently need...    Turning from your back to your side while in flat bed without using bedrails?  3  -JR     Moving from lying on back to sitting on the side of a flat bed without bedrails?  3  -JR     Moving to and from a bed to a chair (including a wheelchair)?  3  -JR     Standing up from a chair using your arms (e.g., wheelchair, bedside chair)?  3  -JR     Climbing 3-5 steps with a railing?  2  -JR     To walk in hospital room?  3  -JR     AM-PAC 6 Clicks Score  17  -JR     How much help from another is currently needed...    Putting on and taking off regular lower body clothing? 3  -SG      Bathing (including washing, rinsing, and drying) 3  -SG      Toileting (which includes using toilet bed pan or urinal) 3  -SG      Putting on and taking off regular upper body clothing 3  -SG      Taking care of personal grooming (such as brushing teeth) 3  -SG      Eating meals 4  -SG      Score 19  -SG      Functional Assessment    Outcome Measure Options AM-PAC 6 Clicks Daily Activity (OT)  -SG AM-PAC 6 Clicks Basic Mobility (PT)  -JR       User Key  (r) = Recorded By, (t) = Taken By, (c) = Cosigned By    Initials Name Provider Type    AC Akilah Quiroga, OT Occupational Therapist    JR Kaylee Kurtz, PT Physical Therapist    LM Xochitl Hernandez, PT Physical Therapist    SG Klaudia Rodrigues Occupational Therapist           Time Calculation:         PT Charges       03/23/17 1304          Time Calculation    Start Time 1021  -LM      PT Received On 03/23/17  -LM      Time  Calculation- PT    Total Timed Code Minutes- PT 24 minute(s)  -LM        User Key  (r) = Recorded By, (t) = Taken By, (c) = Cosigned By    Initials Name Provider Type    LM Xochitl Hernandez PT Physical Therapist          Therapy Charges for Today     Code Description Service Date Service Provider Modifiers Qty    63786030427 HC GAIT TRAINING EA 15 MIN 3/22/2017 Xochitl Hernandez, PT GP 1    15371280255 HC GAIT TRAINING EA 15 MIN 3/23/2017 Xochitl Hernandez, PT GP 1    05810828578 HC PT THER PROC EA 15 MIN 3/23/2017 Xochitl Hernandez, PT GP 1          PT G-Codes  Outcome Measure Options: AM-PAC 6 Clicks Basic Mobility (PT)    Xochitl Hernandez PT  3/23/2017

## 2017-03-23 NOTE — PROGRESS NOTES
Continued Stay Note  [facility]     Patient Name: Thurman Mendel Parsons  MRN: [id number]  Today's Date: 3/23/2017    Admit Date: 3/18/2017          Discharge Plan       03/23/17 1451    Case Management/Social Work Plan    Plan DC summary faxed to Angela.      03/23/17 1449    Case Management/Social Work Plan    Plan Spoke with Dr Lopez this  who reported pt could benefit from short term rehab.  Pt will need BiPAP.  Stated pt able to DC today.  Spoke with Sylvia at [facility] to update.  Dr Lopez's note with BiPAP setting and PT note faxed to Angela.    Additional Comments Spoke with pt who was agreeable to short term rehab.              Discharge Codes     None        Expected Discharge Date and Time     Expected Discharge Date Expected Discharge Time    Mar 23, 2017             Michaela Snowden

## 2017-03-23 NOTE — PROGRESS NOTES
"   LOS: 1 day   Patient Care Team:  Miguel Rojas MD as PCP - General  Miguel Rojas MD as PCP - Family Medicine   Management team: Hospitalist    Chief Complaint: Acute hypercapnic respiratory failure with respiratory distress due to COPD exacerbation possibly bacterial bronchitis.    Subjective     Shortness of Breath   Pertinent negatives include no fever.       Subjective:  Symptoms:  He reports shortness of breath.     patient was admitted with progressive respiratory distress with hypoxemia but also hypercapnia which has been corrected.  Agent denies any chest pains and shows some progressive improvement.  Patient was able to walk 180 feet today with maintaining adequate O2 saturation.  Patient's strength has improved but somewhat wobbly still.  Patient lives alone by himself.  Patient has become weak and has difficulty in self-management at home currently due to his acute illness.  His cough is better.  He has been educated regarding the need to not smoke.  Carboxyhemoglobin was elevated indicating possibility of some smoking intermittently.    History taken from: patient and chart.    Objective     Vital Signs  Temp:  [97.4 °F (36.3 °C)-98.9 °F (37.2 °C)] 97.4 °F (36.3 °C)  Heart Rate:  [63-77] 66  Resp:  [18-20] 18  BP: (130-148)/(62-87) 148/81    Objective:  General Appearance:  Comfortable, well-appearing, in no acute distress and not in pain.    Vital signs: (most recent): Blood pressure 148/81, pulse 66, temperature 97.4 °F (36.3 °C), temperature source Oral, resp. rate 18, height 68\" (172.7 cm), weight 143 lb 1.3 oz (64.9 kg), SpO2 96 %.  Vital signs are normal.  No fever.    Output: Producing urine.    HEENT: (Normal except absence of uvula and posterior soft palate from prior surgery.  Nasal mucosal congestion with erythema.  Nasal cannula in place.)    Lungs:  Normal respiratory rate and normal effort.  There are rales and decreased breath sounds.  (Posterior by basal few rales with distant breath " sounds bilaterally noted.  No wheezes noted.  Chronic hoarseness noted.)  Heart: Normal rate.  Regular rhythm.  S1 normal and S2 normal.    Chest: No chest wall tenderness.  Symmetric chest wall expansion.   Abdomen: Abdomen is soft and non-distended.  There are no signs of ascites.  Bowel sounds are normal.   There is no abdominal tenderness.     Extremities: Normal range of motion.  There is no venous stasis, local swelling or dependent edema.    Pulses: Distal pulses are intact.    Neurological: Patient is alert and oriented to person, place and time.  Normal strength.    Pupils:  Pupils are equal, round, and reactive to light.    Skin:  Warm and dry.                 Results Review:     I reviewed the patient's new clinical results.  I reviewed the patient's other test results and agree with the interpretation    Medication Review: Meds reviewed done.  Patient continues to be on me type prednisone 40 mg every 12 hours along with ceftriaxone as well as azithromycin as antibiotic of choice for community-acquired bacterial bronchitis leading to moderate exacerbation and hospitalization.  Patient is continued on home medication to include drugs for cholesterol control blood pressure and heart rate control DVT prophylaxis TIA prophylaxis as well as Brio ellipta.    Assessment/Plan     Principal Problem:    COPD with exacerbation  Active Problems:    Atherosclerotic heart disease of native coronary artery without angina pectoris    Chronic kidney disease, stage III (moderate)    Steroid-dependent COPD    Degeneration of cervical intervertebral disc    GERD without esophagitis    Hiatal hernia    Hyperlipidemia    Hypertension    Chronic respiratory failure      Assessment & Plan   COPD exacerbation with hypercapnic hypoxemic respiratory failure.  PCO2 is improved to 53 and clinically and symptomatically patient has improved.  COPD with advanced lung disease with possible history of continued intermittent smoking.  CK  T3 disease  ASHD with stable coronary artery disease.  Cervical joint degenerative disc disease with chronic neck pains.  Hilar hernia with GERD  Obstructive sleep apnea hypopnea syndrome despite U P3 surgery  Plan: I'll discuss with patient that he is improving and with inpatient rehabilitation for 7-10 days he should be able to return to his home and an outpatient pulmonary rehabilitation that he promises to restart.  I will also try to reach out to him at the nursing home locally who is accepting the patient for short term.  We also in process of initiating inpatient pulmonary rehabilitation with exercise program and a protocol to use suit many such patient's before they go home.  Addendum: I have reviewed patient's cardiorespiratory study showing evidence of moderate obstructive sleep apnea syndrome with an RDI of 23 per hour and OD I 6 per hour with lowest oxygen saturation 84% on 4 L nasal cannula.  Patient also had mild hypercapnia during sleep maximum of 50 mmHg.  This will qualify this patient for BiPAP therapy at 14 x 6 cm with in-line oxygen at 4 L/m with heated humidity to be used every night.  I would encourage the patient to be compliant with this to manage his sleep apnea as well as chronic respiratory insufficiency due to underlying COPD and advanced lung disease.    Kevin Lopez MD  03/22/17  10:59 PM

## 2017-03-23 NOTE — PLAN OF CARE
Problem: Patient Care Overview (Adult)  Goal: Plan of Care Review  Outcome: Ongoing (interventions implemented as appropriate)    03/23/17 0434   Coping/Psychosocial Response Interventions   Plan Of Care Reviewed With patient   Patient Care Overview   Progress improving   Outcome Evaluation   Outcome Summary/Follow up Plan Less shortness of air and anxiety overnight.       Goal: Adult Individualization and Mutuality  Outcome: Ongoing (interventions implemented as appropriate)    03/20/17 0340   Individualization   Patient Specific Preferences patient needs his rest, please cluster care and do not allow visitors to awaken pt at night       Goal: Discharge Needs Assessment  Outcome: Ongoing (interventions implemented as appropriate)    Problem: COPD, Chronic Bronchitis/Emphysema (Adult)  Goal: Signs and Symptoms of Listed Potential Problems Will be Absent or Manageable (COPD, Chronic Bronchitis/Emphysema)  Outcome: Ongoing (interventions implemented as appropriate)    03/23/17 0434   COPD, Chronic Bronchitis/Emphysema   Problems Assessed (COPD, Chronic Bronchitis/Emphysema) all   Problems Present (COPD, Chronic Bronchitis/Emphysema) none

## 2017-03-23 NOTE — THERAPY DISCHARGE NOTE
Acute Care - Occupational Therapy Discharge Summary  [facility]     Patient Name: Thurman Mendel Parsons  : 1950  MRN: [id number]    Today's Date: 3/23/2017  Onset of Illness/Injury or Date of Surgery Date: 17    Date of Referral to OT: 17  Referring Physician: Dr Davis      Admit Date: 3/18/2017        OT Recommendation and Plan    Visit Dx:    ICD-10-CM ICD-9-CM   1. COPD with exacerbation J44.1 491.21   2. Impaired mobility and ADLs Z74.09 799.89   3. Impaired functional mobility, balance, gait, and endurance Z74.09 V49.89                     OT Goals       17 1508 17 1649       Strength OT LTG    Strength Goal OT LTG, Date Established  17  -SG     Strength Goal OT LTG, Time to Achieve  2 wks  -SG     Strength Goal OT LTG, Measure to Achieve  Patient will engage in BUE strengthening exercises 1x10 to improve functional strength for ADL's.  -SG     Strength Goal OT LTG, Outcome goal ongoing  -AC goal ongoing  -SG     Dynamic Standing Balance OT LTG    Dynamic Standing Balance OT LTG, Date Established  17  -SG     Dynamic Standing Balance OT LTG, Time to Achieve  2 wks  -SG     Dynamic Standing Balance OT LTG, Callaway Level  contact guard assist  -SG     Dynamic Standing Balance OT LTG, Additional Goal  Patient will engage in dynamic standing balance activities with CGA to increase balance and safety for ADL's.  -SG     Dynamic Standing Balance OT LTG, Outcome goal ongoing  -AC goal ongoing  -SG     UB Dressing OT LTG    UB Dressing Goal OT LTG, Date Established  17  -SG     UB Dressing Goal OT LTG, Time to Achieve  2 wks  -SG     UB Dressing Goal OT LTG, Callaway Level  supervision required  -SG     UB Dressing Goal OT LTG, Outcome goal ongoing  -AC goal ongoing  -SG     Activity Tolerance OT LTG    Activity Tolerance Goal OT LTG, Date Established  17  -SG     Activity Tolerance Goal OT LTG, Time to Achieve  2 wks  -SG     Activity Tolerance Goal  OT LTG, Activity Level  20 min activity  -SG     Activity Tolerance Goal OT LTG, Additional Goal  Patient will be able to tolerate 20 min of ther ex/ther act.  -SG     Activity Tolerance Goal OT LTG, Outcome goal ongoing  -AC goal ongoing  -SG       User Key  (r) = Recorded By, (t) = Taken By, (c) = Cosigned By    Initials Name Provider Type    AC Akilah Quiroga, OT Occupational Therapist    JOE Rodrigues Occupational Therapist                Outcome Measures       03/23/17 1021 03/22/17 1353 03/22/17 1352    How much help from another person do you currently need...    Turning from your back to your side while in flat bed without using bedrails? 4  -LM  4  -LM    Moving from lying on back to sitting on the side of a flat bed without bedrails? 3  -LM  3  -LM    Moving to and from a bed to a chair (including a wheelchair)? 3  -LM  3  -LM    Standing up from a chair using your arms (e.g., wheelchair, bedside chair)? 3  -LM  3  -LM    Climbing 3-5 steps with a railing? 2  -LM  2  -LM    To walk in hospital room? 3  -LM  3  -LM    AM-PAC 6 Clicks Score 18  -LM  18  -LM    How much help from another is currently needed...    Putting on and taking off regular lower body clothing?  3  -AC     Bathing (including washing, rinsing, and drying)  3  -AC     Toileting (which includes using toilet bed pan or urinal)  3  -AC     Putting on and taking off regular upper body clothing  3  -AC     Taking care of personal grooming (such as brushing teeth)  4  -AC     Eating meals  4  -AC     Score  20  -AC     Functional Assessment    Outcome Measure Options AM-PAC 6 Clicks Basic Mobility (PT)  -LM AM-PAC 6 Clicks Daily Activity (OT)  -AC AM-PAC 6 Clicks Basic Mobility (PT)  -LM      03/21/17 1520 03/21/17 1518       How much help from another person do you currently need...    Turning from your back to your side while in flat bed without using bedrails?  3  -JR     Moving from lying on back to sitting on the side of a flat bed  without bedrails?  3  -JR     Moving to and from a bed to a chair (including a wheelchair)?  3  -JR     Standing up from a chair using your arms (e.g., wheelchair, bedside chair)?  3  -JR     Climbing 3-5 steps with a railing?  2  -JR     To walk in hospital room?  3  -JR     AM-PAC 6 Clicks Score  17  -JR     How much help from another is currently needed...    Putting on and taking off regular lower body clothing? 3  -SG      Bathing (including washing, rinsing, and drying) 3  -SG      Toileting (which includes using toilet bed pan or urinal) 3  -SG      Putting on and taking off regular upper body clothing 3  -SG      Taking care of personal grooming (such as brushing teeth) 3  -SG      Eating meals 4  -SG      Score 19  -SG      Functional Assessment    Outcome Measure Options AM-PAC 6 Clicks Daily Activity (OT)  -SG AM-PAC 6 Clicks Basic Mobility (PT)  -JR       User Key  (r) = Recorded By, (t) = Taken By, (c) = Cosigned By    Initials Name Provider Type    AC Akilah Quiroga, OT Occupational Therapist    JR Kaylee Kurtz, PT Physical Therapist    FRANCESCO Hernandez, PT Physical Therapist    SG Klaudia Rodrigues Occupational Therapist              OT Discharge Summary  Anticipated Discharge Disposition: inpatient rehabilitation facility  Reason for Discharge: Discharge from facility  Outcomes Achieved: Unable to make functional progress toward goals at this time  Discharge Destination: Inpatient rehabilitation facility      Akilah  Matt  3/23/2017

## 2017-03-24 NOTE — PROGRESS NOTES
Continued Stay Note  [facility]     Patient Name: Thurman Mendel Parsons  MRN: [id number]  Today's Date: 3/24/2017    Admit Date: 3/18/2017          Discharge Plan       03/24/17 1650    Final Note    Final Note Discharged to [facility] for short term rehab 3/23/17              Discharge Codes     None        Expected Discharge Date and Time     Expected Discharge Date Expected Discharge Time    Mar 23, 2017             Michaela Snowden

## 2017-04-06 ENCOUNTER — OFFICE VISIT (OUTPATIENT)
Dept: INTERNAL MEDICINE | Facility: CLINIC | Age: 67
End: 2017-04-06

## 2017-04-06 VITALS
OXYGEN SATURATION: 94 % | RESPIRATION RATE: 14 BRPM | DIASTOLIC BLOOD PRESSURE: 62 MMHG | HEART RATE: 64 BPM | HEIGHT: 68 IN | BODY MASS INDEX: 21.07 KG/M2 | TEMPERATURE: 98 F | SYSTOLIC BLOOD PRESSURE: 110 MMHG | WEIGHT: 139 LBS

## 2017-04-06 DIAGNOSIS — J44.9 STEROID-DEPENDENT COPD (HCC): ICD-10-CM

## 2017-04-06 DIAGNOSIS — E11.8 TYPE 2 DIABETES MELLITUS WITH COMPLICATION, WITHOUT LONG-TERM CURRENT USE OF INSULIN (HCC): ICD-10-CM

## 2017-04-06 DIAGNOSIS — I25.10 ATHEROSCLEROSIS OF NATIVE CORONARY ARTERY OF NATIVE HEART WITHOUT ANGINA PECTORIS: Primary | ICD-10-CM

## 2017-04-06 DIAGNOSIS — G47.30 SLEEP APNEA, UNSPECIFIED TYPE: ICD-10-CM

## 2017-04-06 DIAGNOSIS — J18.9 PNEUMONIA DUE TO INFECTIOUS ORGANISM, UNSPECIFIED LATERALITY, UNSPECIFIED PART OF LUNG: ICD-10-CM

## 2017-04-06 DIAGNOSIS — I48.91 ATRIAL FIBRILLATION, UNSPECIFIED TYPE (HCC): ICD-10-CM

## 2017-04-06 DIAGNOSIS — K21.9 GERD WITHOUT ESOPHAGITIS: ICD-10-CM

## 2017-04-06 DIAGNOSIS — E78.5 HYPERLIPIDEMIA, UNSPECIFIED HYPERLIPIDEMIA TYPE: ICD-10-CM

## 2017-04-06 DIAGNOSIS — F41.9 ANXIETY: ICD-10-CM

## 2017-04-06 DIAGNOSIS — F32.A DEPRESSION, UNSPECIFIED DEPRESSION TYPE: ICD-10-CM

## 2017-04-06 DIAGNOSIS — I10 ESSENTIAL HYPERTENSION: ICD-10-CM

## 2017-04-06 PROCEDURE — 99213 OFFICE O/P EST LOW 20 MIN: CPT | Performed by: INTERNAL MEDICINE

## 2017-04-06 RX ORDER — ATORVASTATIN CALCIUM 40 MG/1
40 TABLET, FILM COATED ORAL DAILY
Qty: 30 TABLET | Refills: 3 | Status: SHIPPED | OUTPATIENT
Start: 2017-04-06 | End: 2018-03-27 | Stop reason: SDUPTHER

## 2017-04-06 RX ORDER — AMLODIPINE BESYLATE 5 MG/1
5 TABLET ORAL DAILY
Qty: 30 TABLET | Refills: 3 | Status: SHIPPED | OUTPATIENT
Start: 2017-04-06 | End: 2017-08-14 | Stop reason: SDUPTHER

## 2017-04-06 RX ORDER — CARVEDILOL 3.12 MG/1
3.12 TABLET ORAL 2 TIMES DAILY WITH MEALS
Qty: 60 TABLET | Refills: 3 | Status: SHIPPED | OUTPATIENT
Start: 2017-04-06 | End: 2017-09-04 | Stop reason: SDUPTHER

## 2017-04-06 NOTE — PROGRESS NOTES
Subjective   Thurman Mendel Parsons is a 66 y.o. male.     Chief Complaint   Patient presents with   • Transitional Care Management     here for hospital follow up        History of Present Illness   Patient here fair 4 hospital follow-up.  Recently developed pneumonia patient does have chronica COPD patient was treated with antibiotics doing much better now no significant cough or short of breath.  Prednisone was raised from 5 mg to 10 mg.  Sugar also elevated in the hospital patient was given NovoLog.  Blood pressure slightly elevated patient was given amlodipine 5 mg daily and Coreg was decreased from 6.25-3.125.  Chronica kidney disease a patient is seeing kidney doctor.    Current Outpatient Prescriptions:   •  albuterol (PROVENTIL) (2.5 MG/3ML) 0.083% nebulizer solution, Albuterol Sulfate (2.5 MG/3ML) 0.083% Inhalation Nebulization Solution; Patient Sig: Albuterol Sulfate (2.5 MG/3ML) 0.083% Inhalation Nebulization Solution USE 1 UNIT DOSE EVERY 4-6 HOURS AS NEEDED FOR WHEEZING .; 3; 5; 04-Sep-2012; Active, Disp: , Rfl:   •  alfuzosin (UROXATRAL) 10 MG 24 hr tablet, TAKE ONE TABLET BY MOUTH EVERY MORNING, Disp: 30 tablet, Rfl: 4  •  ALPRAZolam (XANAX) 0.5 MG tablet, Take 1 tablet by mouth At Night As Needed for anxiety., Disp: 30 tablet, Rfl: 3  •  amLODIPine (NORVASC) 5 MG tablet, TAKE ONE TABLET BY MOUTH DAILY, Disp: 90 tablet, Rfl: 0  •  amoxicillin-clavulanate (AUGMENTIN) 875-125 MG per tablet, Take 1 tablet by mouth 2 (Two) Times a Day., Disp: 14 tablet, Rfl: 0  •  aspirin 81 MG tablet, Take 81 mg by mouth daily., Disp: , Rfl:   •  atorvastatin (LIPITOR) 40 MG tablet, Take 1 tablet by mouth Daily., Disp: 90 tablet, Rfl: 1  •  BREO ELLIPTA 200-25 MCG/INH aerosol powder , , Disp: , Rfl: 10  •  carvedilol (COREG) 6.25 MG tablet, Take 1 tablet by mouth 2 (Two) Times a Day With Meals., Disp: 180 tablet, Rfl: 1  •  clopidogrel (PLAVIX) 75 MG tablet, Take 1 tablet by mouth Daily., Disp: 90 tablet, Rfl: 1  •   cyclobenzaprine (FLEXERIL) 10 MG tablet, Take 5 mg by mouth 2 (Two) Times a Day As Needed., Disp: , Rfl:   •  escitalopram (LEXAPRO) 10 MG tablet, Take 1 tablet by mouth Daily., Disp: 90 tablet, Rfl: 1  •  famotidine (PEPCID) 20 MG tablet, TAKE ONE TABLET BY MOUTH DAILY ** NEED TO CALL PRIMARY DOCTOR FOR REFILLS, Disp: 30 tablet, Rfl: 0  •  fluticasone (FLONASE) 50 MCG/ACT nasal spray, , Disp: , Rfl:   •  iron polysaccharides (POLY-IRON 150) 150 MG capsule, Take  by mouth daily., Disp: , Rfl:   •  losartan (COZAAR) 100 MG tablet, Take 1 tablet by mouth daily., Disp: 90 tablet, Rfl: 3  •  nitroglycerin (NITROSTAT) 0.4 MG SL tablet, Place  under the tongue., Disp: , Rfl:   •  oxybutynin (DITROPAN) 5 MG tablet, TAKE ONE TABLET BY MOUTH AT BEDTIME, Disp: 30 tablet, Rfl: 5  •  predniSONE (DELTASONE) 10 MG tablet, Take 10 mg by mouth Daily., Disp: , Rfl:   •  predniSONE (DELTASONE) 5 MG tablet, Take 1 tablet by mouth Daily., Disp: 21 tablet, Rfl: 0  •  PROAIR  (90 BASE) MCG/ACT inhaler, , Disp: , Rfl:     The following portions of the patient's history were reviewed and updated as appropriate: allergies, current medications, past family history, past medical history, past social history, past surgical history and problem list.    Review of Systems   Constitutional: Negative.    Respiratory: Positive for cough, shortness of breath and wheezing.    Cardiovascular: Negative.    Gastrointestinal: Negative.    Musculoskeletal: Negative.    Skin: Negative.    Neurological: Negative.    Psychiatric/Behavioral: Negative.        Objective   Physical Exam   Constitutional: He is oriented to person, place, and time. He appears well-nourished.   Neck: Neck supple.   Cardiovascular: Normal rate, regular rhythm and normal heart sounds.    Pulmonary/Chest: Effort normal. He has wheezes (mild).   Abdominal: Bowel sounds are normal.   Neurological: He is alert and oriented to person, place, and time.   Skin: Skin is warm.    Psychiatric: He has a normal mood and affect.       All tests have been reviewed.    Assessment/Plan   There are no diagnoses linked to this encounter.          COPD continue inhaler chronic prednisone 5 mg daily increased to 10mg in hosp.   osteopenia, oscal D 500mg bid we will discuss bone density scan schedule---  Weight loss stable now  left low back pain follow up with ortho s/p left hip surgery for severe OA doing well.   alkaline phosphatase still high watch---  divertic hemorroid on colon 1/2013  DM CONTINUE MEDICINE  phimosis seen by uro declined surgery  Hematuria followup with urologist s/p cystoscope bladder stones  Hypertension high today, Need the medication confirmation.Patient is audrey--  BPH enlarged on CT scan follow up with urologist.continue flomax   Anemia workup all negative.normal now. It is anemia of chronic disease due to chronic kidney disease followup with nephrologist  CKD follow up with kidney doctor  History of the CAD atrial fibrillation VSD status post occlusion stable now. Status post a defibrillator seen by cardio follow up with cardio  History of a TIA patient is on Plavix continue  pneumovax done, ,zostavax to pharmacy, prevnar 13 1/19/17, Td to HD.  Depression cont lexapro 20  Decline to disrobe for PE  right inquinal hernia decline surgery now, call if worse  Diarrhea avoid dairy product and probiotic and fiber improved  2 weeks after lab and XR

## 2017-04-10 RX ORDER — CYCLOBENZAPRINE HCL 10 MG
TABLET ORAL
Qty: 30 TABLET | Refills: 1 | Status: SHIPPED | OUTPATIENT
Start: 2017-04-10 | End: 2017-06-04 | Stop reason: SDUPTHER

## 2017-04-13 ENCOUNTER — APPOINTMENT (OUTPATIENT)
Dept: LAB | Facility: HOSPITAL | Age: 67
End: 2017-04-13

## 2017-04-13 ENCOUNTER — OFFICE VISIT (OUTPATIENT)
Dept: UROLOGY | Facility: CLINIC | Age: 67
End: 2017-04-13

## 2017-04-13 ENCOUNTER — HOSPITAL ENCOUNTER (OUTPATIENT)
Dept: GENERAL RADIOLOGY | Facility: HOSPITAL | Age: 67
Discharge: HOME | End: 2017-04-13
Attending: INTERNAL MEDICINE | Admitting: INTERNAL MEDICINE

## 2017-04-13 ENCOUNTER — RESULTS ENCOUNTER (OUTPATIENT)
Dept: INTERNAL MEDICINE | Facility: CLINIC | Age: 67
End: 2017-04-13

## 2017-04-13 VITALS
OXYGEN SATURATION: 90 % | WEIGHT: 139 LBS | HEART RATE: 67 BPM | SYSTOLIC BLOOD PRESSURE: 129 MMHG | BODY MASS INDEX: 21.07 KG/M2 | DIASTOLIC BLOOD PRESSURE: 79 MMHG | HEIGHT: 68 IN

## 2017-04-13 DIAGNOSIS — R30.0 DYSURIA: ICD-10-CM

## 2017-04-13 DIAGNOSIS — J18.9 PNEUMONIA DUE TO INFECTIOUS ORGANISM, UNSPECIFIED LATERALITY, UNSPECIFIED PART OF LUNG: ICD-10-CM

## 2017-04-13 DIAGNOSIS — N40.0 BENIGN NON-NODULAR PROSTATIC HYPERPLASIA WITHOUT LOWER URINARY TRACT SYMPTOMS: ICD-10-CM

## 2017-04-13 DIAGNOSIS — R35.0 FREQUENCY OF URINATION: Primary | ICD-10-CM

## 2017-04-13 DIAGNOSIS — E11.8 TYPE 2 DIABETES MELLITUS WITH COMPLICATION, WITHOUT LONG-TERM CURRENT USE OF INSULIN (HCC): ICD-10-CM

## 2017-04-13 DIAGNOSIS — N40.1 BPH (BENIGN PROSTATIC HYPERTROPHY) WITH URINARY RETENTION: ICD-10-CM

## 2017-04-13 DIAGNOSIS — R33.8 BPH (BENIGN PROSTATIC HYPERTROPHY) WITH URINARY RETENTION: ICD-10-CM

## 2017-04-13 LAB
ALBUMIN SERPL-MCNC: 4 G/DL (ref 3.2–4.8)
ALBUMIN/GLOB SERPL: 1.7 G/DL (ref 1.5–2.5)
ALP SERPL-CCNC: 65 U/L (ref 25–100)
ALT SERPL W P-5'-P-CCNC: 18 U/L (ref 7–40)
ANION GAP SERPL CALCULATED.3IONS-SCNC: 1 MMOL/L (ref 3–11)
AST SERPL-CCNC: 28 U/L (ref 0–33)
BASOPHILS # BLD AUTO: 0.02 10*3/MM3 (ref 0–0.2)
BASOPHILS NFR BLD AUTO: 0.2 % (ref 0–1)
BILIRUB BLD-MCNC: NEGATIVE MG/DL
BILIRUB SERPL-MCNC: 1 MG/DL (ref 0.3–1.2)
BUN BLD-MCNC: 25 MG/DL (ref 9–23)
BUN/CREAT SERPL: 22.7 (ref 7–25)
CALCIUM SPEC-SCNC: 9.7 MG/DL (ref 8.7–10.4)
CHLORIDE SERPL-SCNC: 99 MMOL/L (ref 99–109)
CLARITY, POC: CLEAR
CO2 SERPL-SCNC: 38 MMOL/L (ref 20–31)
COLOR UR: YELLOW
CREAT BLD-MCNC: 1.1 MG/DL (ref 0.6–1.3)
DEPRECATED RDW RBC AUTO: 42.3 FL (ref 37–54)
EOSINOPHIL # BLD AUTO: 0.25 10*3/MM3 (ref 0.1–0.3)
EOSINOPHIL NFR BLD AUTO: 3.1 % (ref 0–3)
ERYTHROCYTE [DISTWIDTH] IN BLOOD BY AUTOMATED COUNT: 12.4 % (ref 11.3–14.5)
GFR SERPL CREATININE-BSD FRML MDRD: 67 ML/MIN/1.73
GLOBULIN UR ELPH-MCNC: 2.3 GM/DL
GLUCOSE BLD-MCNC: 72 MG/DL (ref 70–100)
GLUCOSE UR STRIP-MCNC: NEGATIVE MG/DL
HBA1C MFR BLD: 5.3 % (ref 4.8–5.6)
HCT VFR BLD AUTO: 39.1 % (ref 38.9–50.9)
HGB BLD-MCNC: 12.4 G/DL (ref 13.1–17.5)
IMM GRANULOCYTES # BLD: 0.02 10*3/MM3 (ref 0–0.03)
IMM GRANULOCYTES NFR BLD: 0.2 % (ref 0–0.6)
KETONES UR QL: NEGATIVE
LEUKOCYTE EST, POC: NEGATIVE
LYMPHOCYTES # BLD AUTO: 1.22 10*3/MM3 (ref 0.6–4.8)
LYMPHOCYTES NFR BLD AUTO: 15.2 % (ref 24–44)
MCH RBC QN AUTO: 29.9 PG (ref 27–31)
MCHC RBC AUTO-ENTMCNC: 31.7 G/DL (ref 32–36)
MCV RBC AUTO: 94.2 FL (ref 80–99)
MONOCYTES # BLD AUTO: 1.12 10*3/MM3 (ref 0–1)
MONOCYTES NFR BLD AUTO: 13.9 % (ref 0–12)
NEUTROPHILS # BLD AUTO: 5.4 10*3/MM3 (ref 1.5–8.3)
NEUTROPHILS NFR BLD AUTO: 67.4 % (ref 41–71)
NITRITE UR-MCNC: NEGATIVE MG/ML
PH UR: 6 [PH] (ref 5–8)
PLATELET # BLD AUTO: 206 10*3/MM3 (ref 150–450)
PMV BLD AUTO: 9.2 FL (ref 6–12)
POTASSIUM BLD-SCNC: 3.7 MMOL/L (ref 3.5–5.5)
PROT SERPL-MCNC: 6.3 G/DL (ref 5.7–8.2)
PROT UR STRIP-MCNC: NEGATIVE MG/DL
RBC # BLD AUTO: 4.15 10*6/MM3 (ref 4.2–5.76)
RBC # UR STRIP: NEGATIVE /UL
SODIUM BLD-SCNC: 138 MMOL/L (ref 132–146)
SP GR UR: 1.01 (ref 1–1.03)
UROBILINOGEN UR QL: NORMAL
WBC NRBC COR # BLD: 8.03 10*3/MM3 (ref 3.5–10.8)

## 2017-04-13 PROCEDURE — 71020 HC CHEST PA AND LATERAL: CPT

## 2017-04-13 PROCEDURE — 36415 COLL VENOUS BLD VENIPUNCTURE: CPT | Performed by: INTERNAL MEDICINE

## 2017-04-13 PROCEDURE — 99213 OFFICE O/P EST LOW 20 MIN: CPT | Performed by: UROLOGY

## 2017-04-13 PROCEDURE — 83036 HEMOGLOBIN GLYCOSYLATED A1C: CPT | Performed by: INTERNAL MEDICINE

## 2017-04-13 PROCEDURE — 85025 COMPLETE CBC W/AUTO DIFF WBC: CPT | Performed by: INTERNAL MEDICINE

## 2017-04-13 PROCEDURE — 81003 URINALYSIS AUTO W/O SCOPE: CPT | Performed by: UROLOGY

## 2017-04-13 PROCEDURE — 80053 COMPREHEN METABOLIC PANEL: CPT | Performed by: INTERNAL MEDICINE

## 2017-04-13 PROCEDURE — 76857 US EXAM PELVIC LIMITED: CPT | Performed by: UROLOGY

## 2017-04-13 RX ORDER — ALFUZOSIN HYDROCHLORIDE 10 MG/1
10 TABLET, EXTENDED RELEASE ORAL DAILY
Qty: 30 TABLET | Refills: 5 | Status: SHIPPED | OUTPATIENT
Start: 2017-04-13 | End: 2018-01-11 | Stop reason: HOSPADM

## 2017-04-13 RX ORDER — FINASTERIDE 5 MG/1
5 TABLET, FILM COATED ORAL DAILY
Qty: 30 TABLET | Refills: 5 | Status: SHIPPED | OUTPATIENT
Start: 2017-04-13 | End: 2017-06-08

## 2017-04-13 NOTE — PROGRESS NOTES
[facility]- UROLOGY  [address], [address]  [place], [place] [address]  [phone]      04/13/2017    [place] Mendel Pena  1950        Pelvic Ultrasound with PVR    A transabdominal pelvic ultrasound was performed using a 3.5 MHz transducer of a B-K Hammond through the suprapubic area.     The purpose of the study was to investigate the patient's difficulty voiding.  There was minimal bladder wall thickening noted.  There were no intravesical filling defects seen.  The post void residual of 136 ml was noted.  Prostate was heterogeneous and was noted to be 35.4 grams.  There was a protrusion of the prostate into the bladder.  Ultrasound images will be scanned into the chart for reference.       CPT code 05819        Performed by Roosevelt Lowe MD

## 2017-04-13 NOTE — PROGRESS NOTES
Chief Complaint  Benign Prostatic Hypertrophy (PATIENT IS HERE FOR 9 MONTH FOLLOW UP TROUBLE URINATING SOMETIMES) and Urinary Retention        HPI  Jean is a 66 y.o. male who returns today for the first time since he was scheduled for dorsal slit circumcision and failed to show up.  Actually his had no more trouble with infections or with his penis and is a high risk for surgery.  He states he was recently hospitalized for COPD and returns today dyspneic at rest on oxygen.  He continues his Uroxatral to assist voiding but feels like he doesn't empty.    Vitals:    04/13/17 1418   BP: 129/79   Pulse: 67   SpO2: 90%       Past Medical History  Past Medical History:   Diagnosis Date   • Abnormal liver enzymes    • Acute bronchitis    • Anemia    • Anxiety    • Arthritis    • Atherosclerotic heart disease of native coronary artery without angina pectoris    • Atrial fibrillation    • Atypical chest pain    • Back pain    • BPH (benign prostatic hyperplasia)    • Cardiac pain    • Chronic bronchitis    • Chronic kidney disease    • COPD (chronic obstructive pulmonary disease)    • Degeneration of cervical intervertebral disc    • Depression    • Diabetes mellitus    • Diverticulosis    • Dyspnea    • Esophageal reflux    • Esophagitis    • Gastritis    • Hematuria    • Hemorrhoids    • Hiatal hernia    • History of arthritis    • History of myocardial infarction    • History of peripheral neuropathy    • History of ventricular septal defect    • History of ventricular septal defect    • Hyperlipidemia    • Hypertension    • Infectious diarrhea    • Infectious diarrhea    • Kyphosis, acquired    • Lumbar radiculopathy    • Mitral and aortic valve disease    • Myocardial infarction    • Nephrolithiasis    • Phimosis    • Respiratory failure    • Sleep apnea    • Stroke syndrome    • Stroke syndrome    • Urinary calculus    • Urinary tract infection    • Ventral hernia        Past Surgical History  Past Surgical History:    Procedure Laterality Date   • HERNIA REPAIR     • SUPRAPUBIC CATHETER INSERTION      History of Percutaneous Catheter Placement Into Ureter   • THROAT SURGERY     • VSD REPAIR      History of VSD Repair By Patch Closure       Medications  has a current medication list which includes the following prescription(s): albuterol, alfuzosin, alprazolam, amlodipine, atorvastatin, breo ellipta, carvedilol, clopidogrel, cyclobenzaprine, escitalopram, famotidine, fluticasone, iron polysaccharides, losartan, nitroglycerin, oxybutynin, prednisone, and proair hfa.      Allergies  Allergies   Allergen Reactions   • Sulfa Antibiotics Other (See Comments), Nausea Only and Nausea And Vomiting       Social History  Social History     Social History Narrative       Family History  He has no family history of bladder or kidney cancer  He has no family history of kidney stones      AUA Symptom Score:      Review of Systems  Review of Systems    Physical Exam  Physical Exam   Constitutional: He is oriented to person, place, and time. He appears well-developed and well-nourished.   HENT:   Head: Normocephalic and atraumatic.   Neck: Normal range of motion.   Pulmonary/Chest: Effort normal. No respiratory distress.   Abdominal: Soft. He exhibits no distension and no mass. There is no tenderness. No hernia. Hernia confirmed negative in the right inguinal area and confirmed negative in the left inguinal area.   Genitourinary: Rectum normal and prostate normal. Uncircumcised.         Musculoskeletal: Normal range of motion.   Lymphadenopathy:     He has no cervical adenopathy. No inguinal adenopathy noted on the right or left side.   Neurological: He is alert and oriented to person, place, and time.   Skin: Skin is warm and dry.   Psychiatric: He has a normal mood and affect. His behavior is normal.   Vitals reviewed.      Labs Recent and today in the office:  Results for orders placed or performed in visit on 04/13/17   POC Urinalysis  Dipstick, Automated   Result Value Ref Range    Color Yellow Yellow, Straw, Dark Yellow, Olya    Clarity, UA Clear Clear    Glucose, UA Negative Negative, 1000 mg/dL (3+) mg/dL    Bilirubin Negative Negative    Ketones, UA Negative Negative    Specific Gravity  1.010 1.005 - 1.030    Blood, UA Negative Negative    pH, Urine 6.0 5.0 - 8.0    Protein, POC Negative Negative mg/dL    Urobilinogen, UA Normal Normal    Leukocytes Negative Negative    Nitrite, UA Negative Negative         Assessment & Plan  A bladder scan today reveals 4 ounces of postvoid residual and a 35 g prostate.  He is at high risk for TURP so I recommended he continue the Uroxatral and we began post.  He might need an indwelling catheter if he goes into urinary retention.

## 2017-04-20 ENCOUNTER — OFFICE VISIT (OUTPATIENT)
Dept: INTERNAL MEDICINE | Facility: CLINIC | Age: 67
End: 2017-04-20

## 2017-04-20 VITALS
HEIGHT: 68 IN | TEMPERATURE: 98 F | RESPIRATION RATE: 14 BRPM | WEIGHT: 142 LBS | BODY MASS INDEX: 21.52 KG/M2 | SYSTOLIC BLOOD PRESSURE: 138 MMHG | HEART RATE: 88 BPM | DIASTOLIC BLOOD PRESSURE: 68 MMHG | OXYGEN SATURATION: 93 %

## 2017-04-20 DIAGNOSIS — F41.9 ANXIETY: ICD-10-CM

## 2017-04-20 DIAGNOSIS — K21.9 GERD WITHOUT ESOPHAGITIS: ICD-10-CM

## 2017-04-20 DIAGNOSIS — J44.9 STEROID-DEPENDENT COPD (HCC): ICD-10-CM

## 2017-04-20 DIAGNOSIS — I10 ESSENTIAL HYPERTENSION: ICD-10-CM

## 2017-04-20 DIAGNOSIS — E11.8 TYPE 2 DIABETES MELLITUS WITH COMPLICATION, WITHOUT LONG-TERM CURRENT USE OF INSULIN (HCC): ICD-10-CM

## 2017-04-20 DIAGNOSIS — R93.6 ABNORMAL FINDINGS ON DIAGNOSTIC IMAGING OF LIMBS: ICD-10-CM

## 2017-04-20 DIAGNOSIS — N18.30 CHRONIC KIDNEY DISEASE, STAGE III (MODERATE) (HCC): ICD-10-CM

## 2017-04-20 DIAGNOSIS — E78.5 HYPERLIPIDEMIA, UNSPECIFIED HYPERLIPIDEMIA TYPE: ICD-10-CM

## 2017-04-20 DIAGNOSIS — J44.9 STEROID-DEPENDENT CHRONIC OBSTRUCTIVE PULMONARY DISEASE (HCC): Primary | ICD-10-CM

## 2017-04-20 DIAGNOSIS — M54.2 NECK PAIN: ICD-10-CM

## 2017-04-20 DIAGNOSIS — I25.10 ATHEROSCLEROSIS OF NATIVE CORONARY ARTERY OF NATIVE HEART WITHOUT ANGINA PECTORIS: ICD-10-CM

## 2017-04-20 PROCEDURE — 99214 OFFICE O/P EST MOD 30 MIN: CPT | Performed by: INTERNAL MEDICINE

## 2017-04-20 NOTE — PROGRESS NOTES
Subjective   Thurman Mendel Parsons is a 66 y.o. male.     Chief Complaint   Patient presents with   • Results     Here for follow up on labs and x-rays    • Leg Swelling     both        History of Present Illness   Year for follow-up.  The COPD exacerbation resolved after medication patient is on prednisone 10 mg daily seeing lung doctor.  Chest x-ray no acute disease.  Weight loss is stable now.  Diabetes is stable without medication.  Osteopenia on chronic steroid treatment need a bone density scan.  Hypertension stable on medication now.  BPH or follow-up with urologist is stable now.  Anemia improved even though still slightly decreased.  Depression stable medication.  Diarrhea resolved after treatment.  Also complains some neck pain on the left side is certain position worse achy in nature over-the-counter medicine no help it's been there for several weeks.    Current Outpatient Prescriptions:   •  albuterol (PROVENTIL) (2.5 MG/3ML) 0.083% nebulizer solution, Albuterol Sulfate (2.5 MG/3ML) 0.083% Inhalation Nebulization Solution; Patient Sig: Albuterol Sulfate (2.5 MG/3ML) 0.083% Inhalation Nebulization Solution USE 1 UNIT DOSE EVERY 4-6 HOURS AS NEEDED FOR WHEEZING .; 3; 5; 04-Sep-2012; Active, Disp: , Rfl:   •  alfuzosin (UROXATRAL) 10 MG 24 hr tablet, Take 1 tablet by mouth Daily., Disp: 30 tablet, Rfl: 5  •  ALPRAZolam (XANAX) 0.5 MG tablet, Take 1 tablet by mouth At Night As Needed for anxiety., Disp: 30 tablet, Rfl: 3  •  amLODIPine (NORVASC) 5 MG tablet, Take 1 tablet by mouth Daily., Disp: 30 tablet, Rfl: 3  •  atorvastatin (LIPITOR) 40 MG tablet, Take 1 tablet by mouth Daily., Disp: 30 tablet, Rfl: 3  •  BREO ELLIPTA 200-25 MCG/INH aerosol powder , , Disp: , Rfl: 10  •  carvedilol (COREG) 3.125 MG tablet, Take 1 tablet by mouth 2 (Two) Times a Day With Meals., Disp: 60 tablet, Rfl: 3  •  clopidogrel (PLAVIX) 75 MG tablet, Take 1 tablet by mouth Daily., Disp: 90 tablet, Rfl: 1  •  cyclobenzaprine  (FLEXERIL) 10 MG tablet, TAKE ONE TABLET BY MOUTH EVERY NIGHT AT BEDTIME AS NEEDED, Disp: 30 tablet, Rfl: 1  •  escitalopram (LEXAPRO) 10 MG tablet, Take 1 tablet by mouth Daily., Disp: 90 tablet, Rfl: 1  •  famotidine (PEPCID) 20 MG tablet, TAKE ONE TABLET BY MOUTH DAILY ** NEED TO CALL PRIMARY DOCTOR FOR REFILLS, Disp: 30 tablet, Rfl: 0  •  finasteride (PROSCAR) 5 MG tablet, Take 1 tablet by mouth Daily., Disp: 30 tablet, Rfl: 5  •  fluticasone (FLONASE) 50 MCG/ACT nasal spray, , Disp: , Rfl:   •  iron polysaccharides (POLY-IRON 150) 150 MG capsule, Take  by mouth daily., Disp: , Rfl:   •  losartan (COZAAR) 100 MG tablet, Take 1 tablet by mouth daily., Disp: 90 tablet, Rfl: 3  •  nitroglycerin (NITROSTAT) 0.4 MG SL tablet, Place  under the tongue., Disp: , Rfl:   •  oxybutynin (DITROPAN) 5 MG tablet, TAKE ONE TABLET BY MOUTH AT BEDTIME, Disp: 30 tablet, Rfl: 5  •  predniSONE (DELTASONE) 10 MG tablet, Take 10 mg by mouth Daily., Disp: , Rfl:   •  PROAIR  (90 BASE) MCG/ACT inhaler, , Disp: , Rfl:     The following portions of the patient's history were reviewed and updated as appropriate: allergies, current medications, past family history, past medical history, past social history, past surgical history and problem list.    Review of Systems   Constitutional: Negative.    Respiratory: Negative.    Cardiovascular: Negative.    Gastrointestinal: Negative.    Musculoskeletal: Positive for neck pain.   Skin: Negative.    Neurological: Negative.    Psychiatric/Behavioral: Negative.        Objective   Physical Exam   Constitutional: He is oriented to person, place, and time. He appears well-nourished.   Neck: Neck supple.   Cardiovascular: Normal rate, regular rhythm and normal heart sounds.    Pulmonary/Chest: Effort normal and breath sounds normal.   Abdominal: Bowel sounds are normal.   Musculoskeletal: He exhibits tenderness (left neck m).   Neurological: He is alert and oriented to person, place, and time.    Skin: Skin is warm.   Psychiatric: He has a normal mood and affect.       All tests have been reviewed.xr and lab    Assessment/Plan   There are no diagnoses linked to this encounter.          COPD continue inhaler chronic prednisone 5 mg daily increased to 10mg in hosp. Follow up with lung doctor  osteopenia, oscal D 500mg bidbone density scan schedule---  Weight loss stable now  left low back pain follow up with ortho s/p left hip surgery for severe OA doing well.   alkaline phosphatase still high watch---  divertic hemorroid on colon 1/2013  DM stable without med  phimosis seen by uro declined surgery  Hematuria followup with urologist s/p cystoscope bladder stones  Hypertension continue meds  BPH enlarged on CT scan follow up with urologist.continue flomax   Anemia workup all negative.normal now. It is anemia of chronic disease due to chronic disease improved now  CKD follow up with kidney doctor  History of the CAD atrial fibrillation VSD status post occlusion stable now. Status post a defibrillator seen by cardio follow up with cardio  History of a TIA patient is on Plavix continue  pneumovax done, zostavax to pharmacy, prevnar 13 1/19/17, Td to HD.  Depression cont lexapro 20  Decline to disrobe for PE  right inquinal hernia decline surgery now, call if worse  Neck pain chiro for now  3 mo

## 2017-04-27 ENCOUNTER — TELEPHONE (OUTPATIENT)
Dept: INTERNAL MEDICINE | Facility: CLINIC | Age: 67
End: 2017-04-27

## 2017-04-27 RX ORDER — LEVOFLOXACIN 500 MG/1
500 TABLET, FILM COATED ORAL DAILY
Qty: 10 TABLET | Refills: 0 | Status: SHIPPED | OUTPATIENT
Start: 2017-04-27 | End: 2017-06-08

## 2017-04-27 NOTE — TELEPHONE ENCOUNTER
----- Message from Miguel Rojas MD sent at 4/27/2017  2:29 PM EDT -----  Contact: PATIENT  Call levaquin 500 daily for 10 days  ----- Message -----     From: Sydnee Fine MA     Sent: 4/27/2017  12:33 PM       To: Miguel Rjoas MD        ----- Message -----     From: Deepika Sanchez     Sent: 4/27/2017  12:22 PM       To: Sydnee Fine MA    Patient called stating that he believes he may have bronchitis again. Would like to know if Dr. Rojas could call something in for him, states this is what he normally does. Please update patient, when possible. Thank you.

## 2017-05-03 ENCOUNTER — OFFICE VISIT (OUTPATIENT)
Dept: INTERNAL MEDICINE | Facility: CLINIC | Age: 67
End: 2017-05-03

## 2017-05-03 VITALS
WEIGHT: 141 LBS | OXYGEN SATURATION: 91 % | HEART RATE: 94 BPM | BODY MASS INDEX: 22.66 KG/M2 | RESPIRATION RATE: 14 BRPM | SYSTOLIC BLOOD PRESSURE: 160 MMHG | HEIGHT: 66 IN | TEMPERATURE: 98 F | DIASTOLIC BLOOD PRESSURE: 80 MMHG

## 2017-05-03 DIAGNOSIS — J44.1 COPD EXACERBATION (HCC): ICD-10-CM

## 2017-05-03 DIAGNOSIS — J44.9 STEROID-DEPENDENT COPD (HCC): Primary | ICD-10-CM

## 2017-05-03 PROCEDURE — 77080 DXA BONE DENSITY AXIAL: CPT | Performed by: INTERNAL MEDICINE

## 2017-05-03 PROCEDURE — 99214 OFFICE O/P EST MOD 30 MIN: CPT | Performed by: INTERNAL MEDICINE

## 2017-05-03 PROCEDURE — 94640 AIRWAY INHALATION TREATMENT: CPT | Performed by: INTERNAL MEDICINE

## 2017-05-03 PROCEDURE — 96372 THER/PROPH/DIAG INJ SC/IM: CPT | Performed by: INTERNAL MEDICINE

## 2017-05-03 RX ORDER — PREDNISONE 20 MG/1
TABLET ORAL
Qty: 15 TABLET | Refills: 0 | Status: SHIPPED | OUTPATIENT
Start: 2017-05-03 | End: 2017-06-08

## 2017-05-03 RX ORDER — METHYLPREDNISOLONE SODIUM SUCCINATE 125 MG/2ML
125 INJECTION, POWDER, LYOPHILIZED, FOR SOLUTION INTRAMUSCULAR; INTRAVENOUS ONCE
Status: COMPLETED | OUTPATIENT
Start: 2017-05-03 | End: 2017-05-03

## 2017-05-03 RX ORDER — IPRATROPIUM BROMIDE AND ALBUTEROL SULFATE 2.5; .5 MG/3ML; MG/3ML
1.5 SOLUTION RESPIRATORY (INHALATION) ONCE
Status: COMPLETED | OUTPATIENT
Start: 2017-05-03 | End: 2017-05-03

## 2017-05-03 RX ORDER — AZITHROMYCIN 500 MG/1
500 TABLET, FILM COATED ORAL DAILY
Qty: 5 TABLET | Refills: 0 | Status: SHIPPED | OUTPATIENT
Start: 2017-05-03 | End: 2017-06-08

## 2017-05-03 RX ADMIN — IPRATROPIUM BROMIDE AND ALBUTEROL SULFATE 3 ML: 2.5; .5 SOLUTION RESPIRATORY (INHALATION) at 17:27

## 2017-05-03 RX ADMIN — METHYLPREDNISOLONE SODIUM SUCCINATE 125 MG: 125 INJECTION, POWDER, LYOPHILIZED, FOR SOLUTION INTRAMUSCULAR; INTRAVENOUS at 17:34

## 2017-05-10 ENCOUNTER — OFFICE VISIT (OUTPATIENT)
Dept: INTERNAL MEDICINE | Facility: CLINIC | Age: 67
End: 2017-05-10

## 2017-05-10 VITALS
SYSTOLIC BLOOD PRESSURE: 146 MMHG | RESPIRATION RATE: 14 BRPM | DIASTOLIC BLOOD PRESSURE: 80 MMHG | TEMPERATURE: 98.2 F | BODY MASS INDEX: 24.66 KG/M2 | HEART RATE: 96 BPM | HEIGHT: 65 IN | WEIGHT: 148 LBS | OXYGEN SATURATION: 94 %

## 2017-05-10 DIAGNOSIS — J44.1 COPD EXACERBATION (HCC): Primary | ICD-10-CM

## 2017-05-10 PROCEDURE — 99213 OFFICE O/P EST LOW 20 MIN: CPT | Performed by: INTERNAL MEDICINE

## 2017-05-10 RX ORDER — DOXYCYCLINE HYCLATE 100 MG/1
100 CAPSULE ORAL 2 TIMES DAILY
Qty: 28 CAPSULE | Refills: 0 | Status: SHIPPED | OUTPATIENT
Start: 2017-05-10 | End: 2017-07-20 | Stop reason: SDUPTHER

## 2017-06-05 RX ORDER — CYCLOBENZAPRINE HCL 10 MG
TABLET ORAL
Qty: 30 TABLET | Refills: 0 | Status: SHIPPED | OUTPATIENT
Start: 2017-06-05 | End: 2017-06-09 | Stop reason: SDUPTHER

## 2017-06-08 ENCOUNTER — OFFICE VISIT (OUTPATIENT)
Dept: CARDIOLOGY | Facility: CLINIC | Age: 67
End: 2017-06-08

## 2017-06-08 VITALS
BODY MASS INDEX: 22.22 KG/M2 | DIASTOLIC BLOOD PRESSURE: 88 MMHG | HEART RATE: 74 BPM | WEIGHT: 146.6 LBS | HEIGHT: 68 IN | SYSTOLIC BLOOD PRESSURE: 150 MMHG | OXYGEN SATURATION: 87 %

## 2017-06-08 DIAGNOSIS — I50.22 CHRONIC SYSTOLIC CONGESTIVE HEART FAILURE (HCC): ICD-10-CM

## 2017-06-08 DIAGNOSIS — J43.1 PANLOBULAR EMPHYSEMA (HCC): Primary | ICD-10-CM

## 2017-06-08 DIAGNOSIS — E78.2 MIXED HYPERLIPIDEMIA: ICD-10-CM

## 2017-06-08 PROCEDURE — 99214 OFFICE O/P EST MOD 30 MIN: CPT | Performed by: INTERNAL MEDICINE

## 2017-06-08 NOTE — PROGRESS NOTES
Thurman Mendel Parsons  1950  [phone]          PCP: Miguel Rojas MD  [address]  [facility] [address]    IDENTIFICATION: A 67 y.o. male retired from [employer]     CAd  1994, 2008- [facility] nonobstr  2011 SE wnl  Abnl ECho  2013 EF 45 w BBB  CVD  B CEA -[facility] remote  CUS [facility] 2014 nonobst  HTN  HL  6/14 137/149/42/65  CKD  PEDRO  COPD  PFO closure Dr Alves    Cc fu cvd, chf        Allergies  Allergies   Allergen Reactions   • Sulfa Antibiotics Other (See Comments), Nausea Only and Nausea And Vomiting       Current Medications    Current Outpatient Prescriptions:   •  albuterol (PROVENTIL) (2.5 MG/3ML) 0.083% nebulizer solution, Albuterol Sulfate (2.5 MG/3ML) 0.083% Inhalation Nebulization Solution; Patient Sig: Albuterol Sulfate (2.5 MG/3ML) 0.083% Inhalation Nebulization Solution USE 1 UNIT DOSE EVERY 4-6 HOURS AS NEEDED FOR WHEEZING .; 3; 5; 04-Sep-2012; Active, Disp: , Rfl:   •  alfuzosin (UROXATRAL) 10 MG 24 hr tablet, Take 1 tablet by mouth Daily., Disp: 30 tablet, Rfl: 5  •  amLODIPine (NORVASC) 5 MG tablet, Take 1 tablet by mouth Daily., Disp: 30 tablet, Rfl: 3  •  atorvastatin (LIPITOR) 40 MG tablet, Take 1 tablet by mouth Daily., Disp: 30 tablet, Rfl: 3  •  BREO ELLIPTA 200-25 MCG/INH aerosol powder , , Disp: , Rfl: 10  •  carvedilol (COREG) 3.125 MG tablet, Take 1 tablet by mouth 2 (Two) Times a Day With Meals., Disp: 60 tablet, Rfl: 3  •  clopidogrel (PLAVIX) 75 MG tablet, Take 1 tablet by mouth Daily., Disp: 90 tablet, Rfl: 1  •  cyclobenzaprine (FLEXERIL) 10 MG tablet, TAKE ONE TABLET BY MOUTH EVERY NIGHT AT BEDTIME AS NEEDED, Disp: 30 tablet, Rfl: 0  •  doxycycline (VIBRAMYCIN) 100 MG capsule, Take 1 capsule by mouth 2 (Two) Times a Day., Disp: 28 capsule, Rfl: 0  •  escitalopram (LEXAPRO) 10 MG tablet, Take 1 tablet by mouth Daily., Disp: 90 tablet, Rfl: 1  •  fluticasone (FLONASE) 50 MCG/ACT nasal spray, 1 spray Daily., Disp: , Rfl:   •  losartan (COZAAR) 100 MG tablet, Take 1 tablet by  mouth daily., Disp: 90 tablet, Rfl: 3  •  nitroglycerin (NITROSTAT) 0.4 MG SL tablet, Place  under the tongue., Disp: , Rfl:   •  oxybutynin (DITROPAN) 5 MG tablet, TAKE ONE TABLET BY MOUTH AT BEDTIME, Disp: 30 tablet, Rfl: 5  •  predniSONE (DELTASONE) 10 MG tablet, Take 10 mg by mouth Daily., Disp: , Rfl:   •  PROAIR  (90 BASE) MCG/ACT inhaler, , Disp: , Rfl:     History of Present Illness     Pt denies any new chest pain, dyspnea, dyspnea on exertion, orthopnea, PND, palpitations, lower extremity edema.  His pulmonary status is somewhat dismal and was readmitted to PAC for 2 weeks this spring .  To restart pulm rehab shortly.  ROS:  All systems have been reviewed and are negative with the exception of those mentioned in the HPI.    OBJECTIVE:  130/68 hr 72 wt 140  Physical Exam   Constitutional: He appears well-developed and well-nourished.   Neck: Normal range of motion. Neck supple. No hepatojugular reflux and no JVD present. Carotid bruit is not present. No tracheal deviation present. No thyromegaly present.   Cardiovascular: Normal rate, regular rhythm, S1 normal, S2 normal, intact distal pulses and normal pulses.  PMI is not displaced.  Exam reveals no gallop, no distant heart sounds, no friction rub, no midsystolic click and no opening snap.    Murmur heard.  Pulses:       Radial pulses are 2+ on the right side, and 2+ on the left side.        Dorsalis pedis pulses are 2+ on the right side, and 2+ on the left side.        Posterior tibial pulses are 2+ on the right side, and 2+ on the left side.   Pulmonary/Chest: He is in respiratory distress. He has wheezes. He has no rales.   Abdominal: Soft. Bowel sounds are normal. He exhibits no mass. There is no tenderness. There is no guarding.       Diagnostic Data:  Procedures      ASSESSMENT:   Diagnosis Plan   1. Panlobular emphysema     2. Chronic systolic congestive heart failure     3. Mixed hyperlipidemia         PLAN:  COPD with chronic hypoxemia on  high flow nasal cannula at baseline.  No apparent right heart failure at current clinically.    LV dysfunction mild with stage III CHF at current no necessity for diuresis    Dyslipidemia on statin therapy    Follow-up 6-9 months sooner if needed    Miguel Rojas MD, thank you for referring Mr. Pena for evaluation.  I have forwarded my electronically generated recommendations to you for review.  Please do not hesitate to call with any questions.      Ulysses Bass MD, FACC

## 2017-06-09 RX ORDER — CYCLOBENZAPRINE HCL 10 MG
TABLET ORAL
Qty: 30 TABLET | Refills: 0 | Status: SHIPPED | OUTPATIENT
Start: 2017-06-09 | End: 2017-07-20

## 2017-07-10 RX ORDER — CYCLOBENZAPRINE HCL 10 MG
TABLET ORAL
Qty: 30 TABLET | Refills: 0 | Status: SHIPPED | OUTPATIENT
Start: 2017-07-10 | End: 2017-09-04 | Stop reason: SDUPTHER

## 2017-07-20 ENCOUNTER — OFFICE VISIT (OUTPATIENT)
Dept: INTERNAL MEDICINE | Facility: CLINIC | Age: 67
End: 2017-07-20

## 2017-07-20 VITALS
RESPIRATION RATE: 14 BRPM | WEIGHT: 142 LBS | DIASTOLIC BLOOD PRESSURE: 70 MMHG | SYSTOLIC BLOOD PRESSURE: 138 MMHG | HEIGHT: 68 IN | BODY MASS INDEX: 21.52 KG/M2 | HEART RATE: 86 BPM | TEMPERATURE: 98 F | OXYGEN SATURATION: 92 %

## 2017-07-20 DIAGNOSIS — E11.8 TYPE 2 DIABETES MELLITUS WITH COMPLICATION, WITHOUT LONG-TERM CURRENT USE OF INSULIN (HCC): ICD-10-CM

## 2017-07-20 DIAGNOSIS — F32.A DEPRESSION, UNSPECIFIED DEPRESSION TYPE: ICD-10-CM

## 2017-07-20 DIAGNOSIS — N18.30 CHRONIC KIDNEY DISEASE, STAGE III (MODERATE) (HCC): ICD-10-CM

## 2017-07-20 DIAGNOSIS — J44.9 STEROID-DEPENDENT COPD (HCC): ICD-10-CM

## 2017-07-20 DIAGNOSIS — E78.5 HYPERLIPIDEMIA, UNSPECIFIED HYPERLIPIDEMIA TYPE: ICD-10-CM

## 2017-07-20 DIAGNOSIS — I48.91 ATRIAL FIBRILLATION, UNSPECIFIED TYPE (HCC): ICD-10-CM

## 2017-07-20 DIAGNOSIS — K21.9 GERD WITHOUT ESOPHAGITIS: ICD-10-CM

## 2017-07-20 DIAGNOSIS — M54.2 NECK PAIN: ICD-10-CM

## 2017-07-20 DIAGNOSIS — I10 ESSENTIAL HYPERTENSION: ICD-10-CM

## 2017-07-20 DIAGNOSIS — I25.10 ATHEROSCLEROSIS OF NATIVE CORONARY ARTERY OF NATIVE HEART WITHOUT ANGINA PECTORIS: Primary | ICD-10-CM

## 2017-07-20 DIAGNOSIS — J44.1 COPD EXACERBATION (HCC): ICD-10-CM

## 2017-07-20 PROCEDURE — 99214 OFFICE O/P EST MOD 30 MIN: CPT | Performed by: INTERNAL MEDICINE

## 2017-07-20 RX ORDER — ALPRAZOLAM 0.5 MG/1
TABLET ORAL
COMMUNITY
Start: 2017-06-18 | End: 2017-08-16 | Stop reason: SDUPTHER

## 2017-07-20 RX ORDER — FINASTERIDE 5 MG/1
5 TABLET, FILM COATED ORAL DAILY
COMMUNITY
Start: 2017-07-08 | End: 2017-12-13 | Stop reason: SDUPTHER

## 2017-07-20 RX ORDER — DOXYCYCLINE HYCLATE 100 MG/1
100 CAPSULE ORAL 2 TIMES DAILY
Qty: 28 CAPSULE | Refills: 0 | Status: SHIPPED | OUTPATIENT
Start: 2017-07-20 | End: 2017-09-18 | Stop reason: HOSPADM

## 2017-07-20 NOTE — PROGRESS NOTES
Subjective   Thurman Mendel Parsons is a 67 y.o. male.     Chief Complaint   Patient presents with   • COPD     Here for follow up    • Eye Problem     discuss        History of Present Illness   Patient here for follow-up.  COPD endstage.  Patient again having worsening of cough yellow sputum monitor worsening of her short of breath.  Diabetes is stable without medication.  Weight loss 4 pounds again.  Osteopenia on medication.  Hypertension stable medication.  Chronic kidney disease follow-up with kidney doctor.  Depression stable medication.  Patient also complains left neck pain mild tenderness certain position worse achy in nature started recently.    Current Outpatient Prescriptions:   •  albuterol (PROVENTIL) (2.5 MG/3ML) 0.083% nebulizer solution, Albuterol Sulfate (2.5 MG/3ML) 0.083% Inhalation Nebulization Solution; Patient Sig: Albuterol Sulfate (2.5 MG/3ML) 0.083% Inhalation Nebulization Solution USE 1 UNIT DOSE EVERY 4-6 HOURS AS NEEDED FOR WHEEZING .; 3; 5; 04-Sep-2012; Active, Disp: , Rfl:   •  alfuzosin (UROXATRAL) 10 MG 24 hr tablet, Take 1 tablet by mouth Daily., Disp: 30 tablet, Rfl: 5  •  ALPRAZolam (XANAX) 0.5 MG tablet, , Disp: , Rfl:   •  amLODIPine (NORVASC) 5 MG tablet, Take 1 tablet by mouth Daily., Disp: 30 tablet, Rfl: 3  •  atorvastatin (LIPITOR) 40 MG tablet, Take 1 tablet by mouth Daily., Disp: 30 tablet, Rfl: 3  •  BREO ELLIPTA 200-25 MCG/INH aerosol powder , , Disp: , Rfl: 10  •  carvedilol (COREG) 3.125 MG tablet, Take 1 tablet by mouth 2 (Two) Times a Day With Meals., Disp: 60 tablet, Rfl: 3  •  clopidogrel (PLAVIX) 75 MG tablet, Take 1 tablet by mouth Daily., Disp: 90 tablet, Rfl: 1  •  cyclobenzaprine (FLEXERIL) 10 MG tablet, TAKE ONE TABLET BY MOUTH EVERY NIGHT AT BEDTIME AS NEEDED, Disp: 30 tablet, Rfl: 0  •  doxycycline (VIBRAMYCIN) 100 MG capsule, Take 1 capsule by mouth 2 (Two) Times a Day., Disp: 28 capsule, Rfl: 0  •  escitalopram (LEXAPRO) 10 MG tablet, Take 1 tablet by  mouth Daily., Disp: 90 tablet, Rfl: 1  •  finasteride (PROSCAR) 5 MG tablet, , Disp: , Rfl:   •  fluticasone (FLONASE) 50 MCG/ACT nasal spray, 1 spray Daily., Disp: , Rfl:   •  losartan (COZAAR) 100 MG tablet, Take 1 tablet by mouth daily., Disp: 90 tablet, Rfl: 3  •  nitroglycerin (NITROSTAT) 0.4 MG SL tablet, Place  under the tongue., Disp: , Rfl:   •  oxybutynin (DITROPAN) 5 MG tablet, TAKE ONE TABLET BY MOUTH AT BEDTIME, Disp: 30 tablet, Rfl: 5  •  predniSONE (DELTASONE) 10 MG tablet, Take 10 mg by mouth Daily., Disp: , Rfl:   •  PROAIR  (90 BASE) MCG/ACT inhaler, , Disp: , Rfl:     The following portions of the patient's history were reviewed and updated as appropriate: allergies, current medications, past family history, past medical history, past social history, past surgical history and problem list.    Review of Systems   Constitutional: Negative.    Respiratory: Positive for cough and shortness of breath.    Cardiovascular: Negative.    Gastrointestinal: Negative.    Musculoskeletal: Negative.    Skin: Negative.    Neurological: Negative.    Psychiatric/Behavioral: Negative.        Objective   Physical Exam   Constitutional: He is oriented to person, place, and time. He appears well-nourished.   Neck: Neck supple.   Cardiovascular: Normal rate, regular rhythm and normal heart sounds.    Pulmonary/Chest: Effort normal.   BS decreased baseline   Abdominal: Bowel sounds are normal.   Neurological: He is alert and oriented to person, place, and time.   Skin: Skin is warm.   Psychiatric: He has a normal mood and affect.       All tests have been reviewed.    Assessment/Plan   There are no diagnoses linked to this encounter.          COPD continue inhaler chronic prednisone 10mg. Follow up with lung doctor, start doxycycline again  osteopenia, oscal D 500mg bid continue  Weight loss  watch  left low back pain follow up with ortho s/p left hip surgery for severe OA doing well.   alkaline phosphatase still  high watch---  divertic hemorroid on colon 1/2013  DM stable without med  phimosis seen by uro declined surgery  Hematuria followup with urologist s/p cystoscope bladder stones  Hypertension continue meds  BPH enlarged on CT scan follow up with urologist.continue flomax   Anemia workup all negative.normal now. It is anemia of chronic disease due to chronic disease   CKD follow up with kidney doctor  History of the CAD atrial fibrillation VSD status post occlusion stable now. Status post a defibrillator seen by cardio follow up with cardio  History of a TIA patient is on Plavix continue  pneumovax done, zostavax to pharmacy, prevnar 13 1/19/17, Td to HD.  Depression cont lexapro 20  Decline to disrobe for PE  right inquinal hernia decline surgery now, call if worse  Neck pain chiro for now  3 mo after labs

## 2017-08-01 ENCOUNTER — TRANSCRIBE ORDERS (OUTPATIENT)
Dept: ADMINISTRATIVE | Facility: HOSPITAL | Age: 67
End: 2017-08-01

## 2017-08-01 ENCOUNTER — HOSPITAL ENCOUNTER (OUTPATIENT)
Dept: GENERAL RADIOLOGY | Facility: HOSPITAL | Age: 67
Discharge: HOME | End: 2017-08-01
Attending: INTERNAL MEDICINE | Admitting: INTERNAL MEDICINE

## 2017-08-01 DIAGNOSIS — J42 CHRONIC BRONCHITIS, UNSPECIFIED CHRONIC BRONCHITIS TYPE (HCC): ICD-10-CM

## 2017-08-01 DIAGNOSIS — J45.901 EXACERBATION OF ASTHMA: ICD-10-CM

## 2017-08-01 DIAGNOSIS — R04.2 HEMOPTYSIS: Primary | ICD-10-CM

## 2017-08-01 PROCEDURE — 71020 HC CHEST PA AND LATERAL: CPT

## 2017-08-14 RX ORDER — AMLODIPINE BESYLATE 5 MG/1
TABLET ORAL
Qty: 30 TABLET | Refills: 5 | Status: SHIPPED | OUTPATIENT
Start: 2017-08-14 | End: 2018-03-05

## 2017-08-17 RX ORDER — ALPRAZOLAM 0.5 MG/1
0.5 TABLET ORAL NIGHTLY PRN
Qty: 30 TABLET | Refills: 2 | Status: SHIPPED | OUTPATIENT
Start: 2017-08-17 | End: 2017-09-18 | Stop reason: HOSPADM

## 2017-08-28 RX ORDER — ESCITALOPRAM OXALATE 10 MG/1
TABLET ORAL
Qty: 30 TABLET | Refills: 3 | Status: SHIPPED | OUTPATIENT
Start: 2017-08-28 | End: 2017-09-18 | Stop reason: HOSPADM

## 2017-09-05 RX ORDER — CARVEDILOL 3.12 MG/1
TABLET ORAL
Qty: 60 TABLET | Refills: 2 | Status: SHIPPED | OUTPATIENT
Start: 2017-09-05 | End: 2018-04-24 | Stop reason: SDUPTHER

## 2017-09-05 RX ORDER — CYCLOBENZAPRINE HCL 10 MG
TABLET ORAL
Qty: 30 TABLET | Refills: 0 | Status: ON HOLD | OUTPATIENT
Start: 2017-09-05 | End: 2017-09-18

## 2017-09-08 ENCOUNTER — HOSPITAL ENCOUNTER (INPATIENT)
Facility: HOSPITAL | Age: 67
LOS: 10 days | Discharge: NURSING, REHAB OR LONG-TERM CARE | End: 2017-09-18
Attending: STUDENT IN AN ORGANIZED HEALTH CARE EDUCATION/TRAINING PROGRAM | Admitting: INTERNAL MEDICINE

## 2017-09-08 ENCOUNTER — APPOINTMENT (OUTPATIENT)
Dept: GENERAL RADIOLOGY | Facility: HOSPITAL | Age: 67
End: 2017-09-08

## 2017-09-08 DIAGNOSIS — J44.1 COPD EXACERBATION (HCC): ICD-10-CM

## 2017-09-08 DIAGNOSIS — J18.9 PNEUMONIA OF RIGHT LOWER LOBE DUE TO INFECTIOUS ORGANISM: Primary | ICD-10-CM

## 2017-09-08 DIAGNOSIS — Z78.9 IMPAIRED MOBILITY AND ADLS: ICD-10-CM

## 2017-09-08 DIAGNOSIS — Z74.09 IMPAIRED FUNCTIONAL MOBILITY AND ACTIVITY TOLERANCE: ICD-10-CM

## 2017-09-08 DIAGNOSIS — Z74.09 IMPAIRED MOBILITY AND ADLS: ICD-10-CM

## 2017-09-08 DIAGNOSIS — J96.22 ACUTE ON CHRONIC RESPIRATORY FAILURE WITH HYPERCAPNIA (HCC): ICD-10-CM

## 2017-09-08 LAB
ALBUMIN SERPL-MCNC: 3.9 G/DL (ref 3.5–5)
ALBUMIN/GLOB SERPL: 1.4 G/DL (ref 1–2)
ALP SERPL-CCNC: 90 U/L (ref 38–126)
ALT SERPL W P-5'-P-CCNC: 37 U/L (ref 13–69)
ANION GAP SERPL CALCULATED.3IONS-SCNC: 11.4 MMOL/L
ARTERIAL PATENCY WRIST A: POSITIVE
AST SERPL-CCNC: 31 U/L (ref 15–46)
BASE EXCESS BLDA CALC-SCNC: 17.6 MMOL/L
BASE EXCESS BLDA CALC-SCNC: 18.4 MMOL/L
BASE EXCESS BLDA CALC-SCNC: 18.4 MMOL/L
BASOPHILS # BLD AUTO: 0.02 10*3/MM3 (ref 0–0.2)
BASOPHILS NFR BLD AUTO: 0.1 % (ref 0–2.5)
BDY SITE: ABNORMAL
BILIRUB SERPL-MCNC: 1.3 MG/DL (ref 0.2–1.3)
BUN BLD-MCNC: 26 MG/DL (ref 7–20)
BUN/CREAT SERPL: 23.6 (ref 6.3–21.9)
CALCIUM SPEC-SCNC: 10.5 MG/DL (ref 8.4–10.2)
CHLORIDE SERPL-SCNC: 96 MMOL/L (ref 98–107)
CO2 SERPL-SCNC: 42 MMOL/L (ref 26–30)
COHGB MFR BLD: 1.4 %
COHGB MFR BLD: 1.5 %
COHGB MFR BLD: 1.5 %
CREAT BLD-MCNC: 1.1 MG/DL (ref 0.6–1.3)
D-LACTATE SERPL-SCNC: 0.6 MMOL/L (ref 0.5–2)
DEPRECATED RDW RBC AUTO: 42.7 FL (ref 37–54)
EOSINOPHIL # BLD AUTO: 0.09 10*3/MM3 (ref 0–0.7)
EOSINOPHIL NFR BLD AUTO: 0.6 % (ref 0–7)
ERYTHROCYTE [DISTWIDTH] IN BLOOD BY AUTOMATED COUNT: 12.1 % (ref 11.5–14.5)
GFR SERPL CREATININE-BSD FRML MDRD: 67 ML/MIN/1.73
GLOBULIN UR ELPH-MCNC: 2.7 GM/DL
GLUCOSE BLD-MCNC: 127 MG/DL (ref 74–98)
HCO3 BLDA-SCNC: 43 MMOL/L (ref 22–28)
HCO3 BLDA-SCNC: 43 MMOL/L (ref 22–28)
HCO3 BLDA-SCNC: 44.6 MMOL/L (ref 22–28)
HCT VFR BLD AUTO: 39.8 % (ref 42–52)
HGB BLD-MCNC: 12.2 G/DL (ref 14–18)
HGB BLDA-MCNC: 11.2 G/DL (ref 12–18)
HGB BLDA-MCNC: 11.2 G/DL (ref 12–18)
HGB BLDA-MCNC: 11.9 G/DL (ref 12–18)
HOROWITZ INDEX BLD+IHG-RTO: 100 %
HOROWITZ INDEX BLD+IHG-RTO: 50 %
HOROWITZ INDEX BLD+IHG-RTO: <21 %
IMM GRANULOCYTES # BLD: 0.09 10*3/MM3 (ref 0–0.06)
IMM GRANULOCYTES NFR BLD: 0.6 % (ref 0–0.6)
LYMPHOCYTES # BLD AUTO: 1.31 10*3/MM3 (ref 0.6–3.4)
LYMPHOCYTES NFR BLD AUTO: 8.3 % (ref 10–50)
MCH RBC QN AUTO: 29.4 PG (ref 27–31)
MCHC RBC AUTO-ENTMCNC: 30.7 G/DL (ref 30–37)
MCV RBC AUTO: 95.9 FL (ref 80–94)
METHGB BLD QL: 0.4 %
METHGB BLD QL: 0.4 %
METHGB BLD QL: 0.5 %
MODALITY: ABNORMAL
MONOCYTES # BLD AUTO: 1.83 10*3/MM3 (ref 0–0.9)
MONOCYTES NFR BLD AUTO: 11.6 % (ref 0–12)
NEUTROPHILS # BLD AUTO: 12.5 10*3/MM3 (ref 2–6.9)
NEUTROPHILS NFR BLD AUTO: 78.8 % (ref 37–80)
NRBC BLD MANUAL-RTO: 0 /100 WBC (ref 0–0)
NT-PROBNP SERPL-MCNC: 1090 PG/ML (ref 0–125)
OXYHGB MFR BLDV: 96.1 % (ref 94–99)
OXYHGB MFR BLDV: 96.1 % (ref 94–99)
OXYHGB MFR BLDV: 98.3 % (ref 94–99)
PCO2 BLDA: 67.6 MM HG (ref 35–45)
PCO2 BLDA: 67.6 MM HG (ref 35–45)
PCO2 BLDA: 97.7 MM HG (ref 35–45)
PH BLDA: 7.27 PH UNITS
PH BLDA: 7.41 PH UNITS
PH BLDA: 7.41 PH UNITS
PLATELET # BLD AUTO: 175 10*3/MM3 (ref 130–400)
PMV BLD AUTO: 9.8 FL (ref 6–12)
PO2 BLDA: 276 MM HG (ref 75–100)
PO2 BLDA: 87 MM HG (ref 75–100)
PO2 BLDA: 87 MM HG (ref 75–100)
POTASSIUM BLD-SCNC: 4.4 MMOL/L (ref 3.5–5.1)
PROT SERPL-MCNC: 6.6 G/DL (ref 6.3–8.2)
RBC # BLD AUTO: 4.15 10*6/MM3 (ref 4.7–6.1)
SAO2 % BLDCOA: 98 %
SAO2 % BLDCOA: 98 %
SODIUM BLD-SCNC: 145 MMOL/L (ref 137–145)
TROPONIN I SERPL-MCNC: 0.08 NG/ML (ref 0–0.03)
WBC NRBC COR # BLD: 15.84 10*3/MM3 (ref 4.8–10.8)

## 2017-09-08 PROCEDURE — 94799 UNLISTED PULMONARY SVC/PX: CPT

## 2017-09-08 PROCEDURE — 80053 COMPREHEN METABOLIC PANEL: CPT | Performed by: STUDENT IN AN ORGANIZED HEALTH CARE EDUCATION/TRAINING PROGRAM

## 2017-09-08 PROCEDURE — 82805 BLOOD GASES W/O2 SATURATION: CPT

## 2017-09-08 PROCEDURE — 84484 ASSAY OF TROPONIN QUANT: CPT | Performed by: STUDENT IN AN ORGANIZED HEALTH CARE EDUCATION/TRAINING PROGRAM

## 2017-09-08 PROCEDURE — 94660 CPAP INITIATION&MGMT: CPT

## 2017-09-08 PROCEDURE — 99223 1ST HOSP IP/OBS HIGH 75: CPT | Performed by: INTERNAL MEDICINE

## 2017-09-08 PROCEDURE — 25010000002 CEFTRIAXONE: Performed by: STUDENT IN AN ORGANIZED HEALTH CARE EDUCATION/TRAINING PROGRAM

## 2017-09-08 PROCEDURE — 83880 ASSAY OF NATRIURETIC PEPTIDE: CPT | Performed by: STUDENT IN AN ORGANIZED HEALTH CARE EDUCATION/TRAINING PROGRAM

## 2017-09-08 PROCEDURE — 71010 HC CHEST PA OR AP: CPT

## 2017-09-08 PROCEDURE — 93005 ELECTROCARDIOGRAM TRACING: CPT | Performed by: STUDENT IN AN ORGANIZED HEALTH CARE EDUCATION/TRAINING PROGRAM

## 2017-09-08 PROCEDURE — 83605 ASSAY OF LACTIC ACID: CPT | Performed by: STUDENT IN AN ORGANIZED HEALTH CARE EDUCATION/TRAINING PROGRAM

## 2017-09-08 PROCEDURE — 99285 EMERGENCY DEPT VISIT HI MDM: CPT

## 2017-09-08 PROCEDURE — 25010000002 METHYLPREDNISOLONE PER 125 MG: Performed by: STUDENT IN AN ORGANIZED HEALTH CARE EDUCATION/TRAINING PROGRAM

## 2017-09-08 PROCEDURE — 83050 HGB METHEMOGLOBIN QUAN: CPT

## 2017-09-08 PROCEDURE — 85025 COMPLETE CBC W/AUTO DIFF WBC: CPT | Performed by: STUDENT IN AN ORGANIZED HEALTH CARE EDUCATION/TRAINING PROGRAM

## 2017-09-08 PROCEDURE — 5A09357 ASSISTANCE WITH RESPIRATORY VENTILATION, LESS THAN 24 CONSECUTIVE HOURS, CONTINUOUS POSITIVE AIRWAY PRESSURE: ICD-10-PCS | Performed by: FAMILY MEDICINE

## 2017-09-08 PROCEDURE — 87040 BLOOD CULTURE FOR BACTERIA: CPT | Performed by: STUDENT IN AN ORGANIZED HEALTH CARE EDUCATION/TRAINING PROGRAM

## 2017-09-08 PROCEDURE — 36600 WITHDRAWAL OF ARTERIAL BLOOD: CPT

## 2017-09-08 PROCEDURE — 82375 ASSAY CARBOXYHB QUANT: CPT

## 2017-09-08 RX ORDER — GUAIFENESIN 600 MG/1
1200 TABLET, EXTENDED RELEASE ORAL 2 TIMES DAILY
Status: DISCONTINUED | OUTPATIENT
Start: 2017-09-09 | End: 2017-09-15 | Stop reason: ALTCHOICE

## 2017-09-08 RX ORDER — ATORVASTATIN CALCIUM 40 MG/1
40 TABLET, FILM COATED ORAL DAILY
Status: DISCONTINUED | OUTPATIENT
Start: 2017-09-09 | End: 2017-09-18 | Stop reason: HOSPADM

## 2017-09-08 RX ORDER — TAMSULOSIN HYDROCHLORIDE 0.4 MG/1
0.4 CAPSULE ORAL NIGHTLY
Status: DISCONTINUED | OUTPATIENT
Start: 2017-09-08 | End: 2017-09-18 | Stop reason: HOSPADM

## 2017-09-08 RX ORDER — ONDANSETRON 2 MG/ML
4 INJECTION INTRAMUSCULAR; INTRAVENOUS EVERY 6 HOURS PRN
Status: DISCONTINUED | OUTPATIENT
Start: 2017-09-08 | End: 2017-09-18 | Stop reason: HOSPADM

## 2017-09-08 RX ORDER — METHYLPREDNISOLONE SODIUM SUCCINATE 125 MG/2ML
125 INJECTION, POWDER, LYOPHILIZED, FOR SOLUTION INTRAMUSCULAR; INTRAVENOUS ONCE
Status: COMPLETED | OUTPATIENT
Start: 2017-09-08 | End: 2017-09-08

## 2017-09-08 RX ORDER — IPRATROPIUM BROMIDE AND ALBUTEROL SULFATE 2.5; .5 MG/3ML; MG/3ML
3 SOLUTION RESPIRATORY (INHALATION) ONCE
Status: COMPLETED | OUTPATIENT
Start: 2017-09-08 | End: 2017-09-08

## 2017-09-08 RX ORDER — ACETAMINOPHEN 500 MG
1000 TABLET ORAL ONCE
Status: COMPLETED | OUTPATIENT
Start: 2017-09-08 | End: 2017-09-08

## 2017-09-08 RX ORDER — AMLODIPINE BESYLATE 5 MG/1
5 TABLET ORAL
Status: DISCONTINUED | OUTPATIENT
Start: 2017-09-09 | End: 2017-09-18 | Stop reason: HOSPADM

## 2017-09-08 RX ORDER — ESCITALOPRAM OXALATE 10 MG/1
10 TABLET ORAL NIGHTLY
Status: DISCONTINUED | OUTPATIENT
Start: 2017-09-08 | End: 2017-09-18 | Stop reason: HOSPADM

## 2017-09-08 RX ORDER — IPRATROPIUM BROMIDE AND ALBUTEROL SULFATE 2.5; .5 MG/3ML; MG/3ML
3 SOLUTION RESPIRATORY (INHALATION)
Status: DISCONTINUED | OUTPATIENT
Start: 2017-09-09 | End: 2017-09-18 | Stop reason: HOSPADM

## 2017-09-08 RX ORDER — SODIUM CHLORIDE 9 MG/ML
75 INJECTION, SOLUTION INTRAVENOUS CONTINUOUS
Status: DISCONTINUED | OUTPATIENT
Start: 2017-09-08 | End: 2017-09-09

## 2017-09-08 RX ORDER — CLOPIDOGREL BISULFATE 75 MG/1
75 TABLET ORAL DAILY
Status: DISCONTINUED | OUTPATIENT
Start: 2017-09-09 | End: 2017-09-18 | Stop reason: HOSPADM

## 2017-09-08 RX ORDER — TRAMADOL HYDROCHLORIDE 50 MG/1
50 TABLET ORAL EVERY 6 HOURS PRN
Status: DISCONTINUED | OUTPATIENT
Start: 2017-09-08 | End: 2017-09-18 | Stop reason: HOSPADM

## 2017-09-08 RX ORDER — ACETAMINOPHEN 325 MG/1
650 TABLET ORAL EVERY 4 HOURS PRN
Status: DISCONTINUED | OUTPATIENT
Start: 2017-09-08 | End: 2017-09-18 | Stop reason: HOSPADM

## 2017-09-08 RX ORDER — CARVEDILOL 3.12 MG/1
3.12 TABLET ORAL 2 TIMES DAILY WITH MEALS
Status: DISCONTINUED | OUTPATIENT
Start: 2017-09-08 | End: 2017-09-09

## 2017-09-08 RX ORDER — FINASTERIDE 5 MG/1
5 TABLET, FILM COATED ORAL DAILY
Status: DISCONTINUED | OUTPATIENT
Start: 2017-09-09 | End: 2017-09-18 | Stop reason: HOSPADM

## 2017-09-08 RX ORDER — METHYLPREDNISOLONE SODIUM SUCCINATE 40 MG/ML
40 INJECTION, POWDER, LYOPHILIZED, FOR SOLUTION INTRAMUSCULAR; INTRAVENOUS EVERY 8 HOURS
Status: DISCONTINUED | OUTPATIENT
Start: 2017-09-09 | End: 2017-09-13

## 2017-09-08 RX ORDER — AZITHROMYCIN 250 MG/1
500 TABLET, FILM COATED ORAL ONCE
Status: COMPLETED | OUTPATIENT
Start: 2017-09-08 | End: 2017-09-08

## 2017-09-08 RX ORDER — DEXTROSE MONOHYDRATE 25 G/50ML
25 INJECTION, SOLUTION INTRAVENOUS
Status: DISCONTINUED | OUTPATIENT
Start: 2017-09-08 | End: 2017-09-18 | Stop reason: HOSPADM

## 2017-09-08 RX ORDER — SODIUM CHLORIDE 0.9 % (FLUSH) 0.9 %
1-10 SYRINGE (ML) INJECTION AS NEEDED
Status: DISCONTINUED | OUTPATIENT
Start: 2017-09-08 | End: 2017-09-18 | Stop reason: HOSPADM

## 2017-09-08 RX ORDER — ALPRAZOLAM 0.5 MG/1
0.5 TABLET ORAL NIGHTLY PRN
Status: DISCONTINUED | OUTPATIENT
Start: 2017-09-08 | End: 2017-09-18 | Stop reason: HOSPADM

## 2017-09-08 RX ORDER — SODIUM CHLORIDE 450 MG/100ML
75 INJECTION, SOLUTION INTRAVENOUS CONTINUOUS
Status: DISCONTINUED | OUTPATIENT
Start: 2017-09-08 | End: 2017-09-12

## 2017-09-08 RX ORDER — NICOTINE POLACRILEX 4 MG
1 LOZENGE BUCCAL
Status: DISCONTINUED | OUTPATIENT
Start: 2017-09-08 | End: 2017-09-18 | Stop reason: HOSPADM

## 2017-09-08 RX ORDER — LOSARTAN POTASSIUM 50 MG/1
100 TABLET ORAL DAILY
Status: DISCONTINUED | OUTPATIENT
Start: 2017-09-09 | End: 2017-09-18 | Stop reason: HOSPADM

## 2017-09-08 RX ADMIN — AZITHROMYCIN 500 MG: 250 TABLET, FILM COATED ORAL at 15:10

## 2017-09-08 RX ADMIN — IPRATROPIUM BROMIDE AND ALBUTEROL SULFATE 3 ML: .5; 3 SOLUTION RESPIRATORY (INHALATION) at 16:42

## 2017-09-08 RX ADMIN — ACETAMINOPHEN 1000 MG: 500 TABLET, FILM COATED ORAL at 20:13

## 2017-09-08 RX ADMIN — TAMSULOSIN HYDROCHLORIDE 0.4 MG: 0.4 CAPSULE ORAL at 22:28

## 2017-09-08 RX ADMIN — ESCITALOPRAM OXALATE 10 MG: 10 TABLET ORAL at 22:28

## 2017-09-08 RX ADMIN — METHYLPREDNISOLONE SODIUM SUCCINATE 125 MG: 125 INJECTION, POWDER, FOR SOLUTION INTRAMUSCULAR; INTRAVENOUS at 18:04

## 2017-09-08 RX ADMIN — SODIUM CHLORIDE 75 ML/HR: 9 INJECTION, SOLUTION INTRAVENOUS at 14:56

## 2017-09-08 RX ADMIN — CEFTRIAXONE 1 G: 1 INJECTION, POWDER, FOR SOLUTION INTRAMUSCULAR; INTRAVENOUS at 14:56

## 2017-09-08 NOTE — ED PROVIDER NOTES
Subjective   HPI Comments: Patient is a 67-year-old male presents for shortness of breath.  His brother brought him in via private vehicle.  At this time the patient cannot answer questions due to his respiratory distress.  Brother states his shortness of breath started yesterday and has progressively worsened.  Currently nothing makes this better or worse.  Brother does state that he has been treated with antibiotics in the past few weeks for respiratory infection and he is concerned that he has pneumonia.      Review of Systems   All other systems reviewed and are negative.      Past Medical History:   Diagnosis Date   • Abnormal liver enzymes    • Acute bronchitis    • Anemia    • Anxiety    • Arthritis    • Atherosclerotic heart disease of native coronary artery without angina pectoris    • Atrial fibrillation    • Atypical chest pain    • Back pain    • BPH (benign prostatic hyperplasia)    • Cardiac pain    • Chronic bronchitis    • Chronic kidney disease    • COPD (chronic obstructive pulmonary disease)    • Degeneration of cervical intervertebral disc    • Depression    • Diabetes mellitus    • Diverticulosis    • Dyspnea    • Esophageal reflux    • Esophagitis    • Gastritis    • Hematuria    • Hemorrhoids    • Hiatal hernia    • History of arthritis    • History of myocardial infarction    • History of peripheral neuropathy    • History of ventricular septal defect    • History of ventricular septal defect    • Hyperlipidemia    • Hypertension    • Infectious diarrhea    • Infectious diarrhea    • Kyphosis, acquired    • Lumbar radiculopathy    • Mitral and aortic valve disease    • Myocardial infarction    • Nephrolithiasis    • Phimosis    • Respiratory failure    • Sleep apnea    • Stroke syndrome    • Stroke syndrome    • Urinary calculus    • Urinary tract infection    • Ventral hernia        Allergies   Allergen Reactions   • Sulfa Antibiotics Other (See Comments), Nausea Only and Nausea And Vomiting        Past Surgical History:   Procedure Laterality Date   • HERNIA REPAIR     • SUPRAPUBIC CATHETER INSERTION      History of Percutaneous Catheter Placement Into Ureter   • THROAT SURGERY     • VSD REPAIR      History of VSD Repair By Patch Closure       Family History   Problem Relation Age of Onset   • Other Father      CABG   • Coronary artery disease Father        Social History     Social History   • Marital status:      Spouse name: N/A   • Number of children: N/A   • Years of education: N/A     Occupational History   •  Retired     Social History Main Topics   • Smoking status: Former Smoker   • Smokeless tobacco: Never Used   • Alcohol use No   • Drug use: No   • Sexual activity: Defer     Other Topics Concern   • None     Social History Narrative           Objective   Physical Exam   Nursing note and vitals reviewed.    GEN: Patient presents in severe respiratory distress unable to complete sentences  Head: Normocephalic, atraumatic  Eyes: Pupils equal round reactive to light  ENT: oral mucosa is moist  Chest: Nontender to palpation  Cardiovascular: Regular rate  Lungs: Tachypneic with a rate in the low 20s with accessory muscle use, Coarse breath sounds at bilateral bases with diminished breath sounds on the right   Abdomen: Soft, nontender, nondistended, no peritoneal signs  Extremities: No edema, normal appearance  Neuro: GCS 15  Psych: Mood and affect are appropriate    Procedures         ED Course  ED Course   Comment By Time   Dr. Mendez graciously accepted the patient for admission. Bigg Campos MD 09/08 [id number]                  MDM  Number of Diagnoses or Management Options  Acute on chronic respiratory failure with hypercapnia:   COPD exacerbation:   Pneumonia of right lower lobe due to infectious organism:   Diagnosis management comments: Greater than 40 minutes critical care time was spent by the attending physician excluding separate billable procedures    Patient initially had an  oxygen saturations in the 40% range when in triage.  He was quickly placed on oxygen nonrebreather which did help with his oxygenation.  Blood gas was suggestive for COPD with hypercapnia.  Physical exam and chest x-ray more suggestive for pneumonia.  Cultures were drawn and antibiotics initiated in the emergency department.  He did significantly improve with BiPAP treatment.       Amount and/or Complexity of Data Reviewed  Clinical lab tests: ordered and reviewed  Discussion of test results with the performing providers: yes  Decide to obtain previous medical records or to obtain history from someone other than the patient: yes  Obtain history from someone other than the patient: yes  Review and summarize past medical records: yes  Discuss the patient with other providers: yes  Independent visualization of images, tracings, or specimens: yes        Final diagnoses:   Pneumonia of right lower lobe due to infectious organism   COPD exacerbation            Bigg Campos MD  09/08/17 [id number]

## 2017-09-08 NOTE — ED NOTES
Called [facility], for tele bed. Will call back w/ bed assignment.      Sophie Araiza  09/08/17 [id number]

## 2017-09-09 ENCOUNTER — APPOINTMENT (OUTPATIENT)
Dept: GENERAL RADIOLOGY | Facility: HOSPITAL | Age: 67
End: 2017-09-09

## 2017-09-09 LAB
ARTERIAL PATENCY WRIST A: POSITIVE
ATMOSPHERIC PRESS: 742 MMHG
BASE EXCESS BLDA CALC-SCNC: 16.2 MMOL/L
BASOPHILS # BLD AUTO: 0.01 10*3/MM3 (ref 0–0.2)
BASOPHILS NFR BLD AUTO: 0.1 % (ref 0–2.5)
BDY SITE: ABNORMAL
DEPRECATED RDW RBC AUTO: 40.8 FL (ref 37–54)
EOSINOPHIL # BLD AUTO: 0 10*3/MM3 (ref 0–0.7)
EOSINOPHIL NFR BLD AUTO: 0 % (ref 0–7)
ERYTHROCYTE [DISTWIDTH] IN BLOOD BY AUTOMATED COUNT: 11.9 % (ref 11.5–14.5)
GLUCOSE BLDC GLUCOMTR-MCNC: 162 MG/DL (ref 70–130)
GLUCOSE BLDC GLUCOMTR-MCNC: 172 MG/DL (ref 70–130)
GLUCOSE BLDC GLUCOMTR-MCNC: 188 MG/DL (ref 70–130)
GLUCOSE BLDC GLUCOMTR-MCNC: 192 MG/DL (ref 70–130)
HCO3 BLDA-SCNC: 40.6 MMOL/L (ref 22–28)
HCT VFR BLD AUTO: 35.7 % (ref 42–52)
HGB BLD-MCNC: 11.2 G/DL (ref 14–18)
HGB BLDA-MCNC: 11.3 G/DL (ref 12–18)
HOROWITZ INDEX BLD+IHG-RTO: 32 %
IMM GRANULOCYTES # BLD: 0.07 10*3/MM3 (ref 0–0.06)
IMM GRANULOCYTES NFR BLD: 0.5 % (ref 0–0.6)
LYMPHOCYTES # BLD AUTO: 0.39 10*3/MM3 (ref 0.6–3.4)
LYMPHOCYTES NFR BLD AUTO: 3 % (ref 10–50)
MAGNESIUM SERPL-MCNC: 2.1 MG/DL (ref 1.6–2.3)
MCH RBC QN AUTO: 29.5 PG (ref 27–31)
MCHC RBC AUTO-ENTMCNC: 31.4 G/DL (ref 30–37)
MCV RBC AUTO: 93.9 FL (ref 80–94)
MODALITY: ABNORMAL
MONOCYTES # BLD AUTO: 0.27 10*3/MM3 (ref 0–0.9)
MONOCYTES NFR BLD AUTO: 2.1 % (ref 0–12)
NEUTROPHILS # BLD AUTO: 12.22 10*3/MM3 (ref 2–6.9)
NEUTROPHILS NFR BLD AUTO: 94.3 % (ref 37–80)
NRBC BLD MANUAL-RTO: 0 /100 WBC (ref 0–0)
PCO2 BLDA: 61.9 MM HG (ref 35–45)
PH BLDA: 7.42 PH UNITS
PLATELET # BLD AUTO: 161 10*3/MM3 (ref 130–400)
PMV BLD AUTO: 9.9 FL (ref 6–12)
PO2 BLDA: 70.9 MM HG (ref 75–100)
RBC # BLD AUTO: 3.8 10*6/MM3 (ref 4.7–6.1)
SAO2 % BLDCOA: 95.6 %
WBC NRBC COR # BLD: 12.96 10*3/MM3 (ref 4.8–10.8)

## 2017-09-09 PROCEDURE — 82962 GLUCOSE BLOOD TEST: CPT

## 2017-09-09 PROCEDURE — 94799 UNLISTED PULMONARY SVC/PX: CPT

## 2017-09-09 PROCEDURE — 25010000002 CEFTRIAXONE: Performed by: INTERNAL MEDICINE

## 2017-09-09 PROCEDURE — 94660 CPAP INITIATION&MGMT: CPT

## 2017-09-09 PROCEDURE — 36600 WITHDRAWAL OF ARTERIAL BLOOD: CPT

## 2017-09-09 PROCEDURE — 97162 PT EVAL MOD COMPLEX 30 MIN: CPT | Performed by: PHYSICAL THERAPIST

## 2017-09-09 PROCEDURE — 25010000002 ENOXAPARIN PER 10 MG: Performed by: INTERNAL MEDICINE

## 2017-09-09 PROCEDURE — 85025 COMPLETE CBC W/AUTO DIFF WBC: CPT | Performed by: INTERNAL MEDICINE

## 2017-09-09 PROCEDURE — 25010000002 AZITHROMYCIN: Performed by: INTERNAL MEDICINE

## 2017-09-09 PROCEDURE — 72100 X-RAY EXAM L-S SPINE 2/3 VWS: CPT

## 2017-09-09 PROCEDURE — 99232 SBSQ HOSP IP/OBS MODERATE 35: CPT | Performed by: INTERNAL MEDICINE

## 2017-09-09 PROCEDURE — 25010000002 METHYLPREDNISOLONE PER 40 MG: Performed by: INTERNAL MEDICINE

## 2017-09-09 PROCEDURE — 82805 BLOOD GASES W/O2 SATURATION: CPT

## 2017-09-09 PROCEDURE — 93005 ELECTROCARDIOGRAM TRACING: CPT | Performed by: INTERNAL MEDICINE

## 2017-09-09 PROCEDURE — 99222 1ST HOSP IP/OBS MODERATE 55: CPT | Performed by: INTERNAL MEDICINE

## 2017-09-09 PROCEDURE — 83735 ASSAY OF MAGNESIUM: CPT | Performed by: INTERNAL MEDICINE

## 2017-09-09 PROCEDURE — 63710000001 INSULIN ASPART PER 5 UNITS: Performed by: INTERNAL MEDICINE

## 2017-09-09 RX ORDER — SPIRONOLACTONE 25 MG/1
12.5 TABLET ORAL DAILY
Status: DISCONTINUED | OUTPATIENT
Start: 2017-09-09 | End: 2017-09-18 | Stop reason: HOSPADM

## 2017-09-09 RX ORDER — ALPRAZOLAM 0.25 MG/1
0.25 TABLET ORAL ONCE
Status: COMPLETED | OUTPATIENT
Start: 2017-09-09 | End: 2017-09-09

## 2017-09-09 RX ORDER — CARVEDILOL 6.25 MG/1
6.25 TABLET ORAL 2 TIMES DAILY WITH MEALS
Status: DISCONTINUED | OUTPATIENT
Start: 2017-09-09 | End: 2017-09-18 | Stop reason: HOSPADM

## 2017-09-09 RX ORDER — ASPIRIN 81 MG/1
81 TABLET ORAL DAILY
Status: DISCONTINUED | OUTPATIENT
Start: 2017-09-09 | End: 2017-09-13

## 2017-09-09 RX ADMIN — IPRATROPIUM BROMIDE AND ALBUTEROL SULFATE 3 ML: .5; 3 SOLUTION RESPIRATORY (INHALATION) at 01:46

## 2017-09-09 RX ADMIN — METHYLPREDNISOLONE SODIUM SUCCINATE 40 MG: 40 INJECTION, POWDER, FOR SOLUTION INTRAMUSCULAR; INTRAVENOUS at 01:08

## 2017-09-09 RX ADMIN — AZITHROMYCIN 500 MG: 500 INJECTION, POWDER, LYOPHILIZED, FOR SOLUTION INTRAVENOUS at 16:55

## 2017-09-09 RX ADMIN — METHYLPREDNISOLONE SODIUM SUCCINATE 40 MG: 40 INJECTION, POWDER, FOR SOLUTION INTRAMUSCULAR; INTRAVENOUS at 19:05

## 2017-09-09 RX ADMIN — METHYLPREDNISOLONE SODIUM SUCCINATE 40 MG: 40 INJECTION, POWDER, FOR SOLUTION INTRAMUSCULAR; INTRAVENOUS at 09:06

## 2017-09-09 RX ADMIN — TAMSULOSIN HYDROCHLORIDE 0.4 MG: 0.4 CAPSULE ORAL at 20:05

## 2017-09-09 RX ADMIN — ASPIRIN 81 MG: 81 TABLET, COATED ORAL at 11:53

## 2017-09-09 RX ADMIN — ENOXAPARIN SODIUM 40 MG: 40 INJECTION SUBCUTANEOUS at 09:07

## 2017-09-09 RX ADMIN — ESCITALOPRAM OXALATE 10 MG: 10 TABLET ORAL at 20:05

## 2017-09-09 RX ADMIN — CARVEDILOL 6.25 MG: 6.25 TABLET, FILM COATED ORAL at 18:55

## 2017-09-09 RX ADMIN — ATORVASTATIN CALCIUM 40 MG: 40 TABLET, FILM COATED ORAL at 09:06

## 2017-09-09 RX ADMIN — CLOPIDOGREL BISULFATE 75 MG: 75 TABLET ORAL at 09:07

## 2017-09-09 RX ADMIN — SODIUM CHLORIDE 75 ML/HR: 4.5 INJECTION, SOLUTION INTRAVENOUS at 15:38

## 2017-09-09 RX ADMIN — LOSARTAN POTASSIUM 100 MG: 50 TABLET, FILM COATED ORAL at 09:07

## 2017-09-09 RX ADMIN — AMLODIPINE BESYLATE 5 MG: 5 TABLET ORAL at 09:06

## 2017-09-09 RX ADMIN — HYDROCODONE POLISTIREX AND CHLORPHENIRAMINE POLISTIREX 5 ML: 10; 8 SUSPENSION, EXTENDED RELEASE ORAL at 20:05

## 2017-09-09 RX ADMIN — GUAIFENESIN 1200 MG: 600 TABLET, EXTENDED RELEASE ORAL at 09:06

## 2017-09-09 RX ADMIN — ALPRAZOLAM 0.5 MG: 0.5 TABLET ORAL at 20:05

## 2017-09-09 RX ADMIN — SODIUM CHLORIDE 75 ML/HR: 4.5 INJECTION, SOLUTION INTRAVENOUS at 01:07

## 2017-09-09 RX ADMIN — TRAMADOL HYDROCHLORIDE 50 MG: 50 TABLET, COATED ORAL at 15:38

## 2017-09-09 RX ADMIN — ACETAMINOPHEN 650 MG: 325 TABLET, FILM COATED ORAL at 09:06

## 2017-09-09 RX ADMIN — CEFTRIAXONE 1 G: 1 INJECTION, POWDER, FOR SOLUTION INTRAMUSCULAR; INTRAVENOUS at 15:38

## 2017-09-09 RX ADMIN — GUAIFENESIN 1200 MG: 600 TABLET, EXTENDED RELEASE ORAL at 19:06

## 2017-09-09 RX ADMIN — SPIRONOLACTONE 12.5 MG: 25 TABLET, FILM COATED ORAL at 11:53

## 2017-09-09 RX ADMIN — IPRATROPIUM BROMIDE AND ALBUTEROL SULFATE 3 ML: .5; 3 SOLUTION RESPIRATORY (INHALATION) at 13:03

## 2017-09-09 RX ADMIN — CARVEDILOL 3.12 MG: 3.12 TABLET, FILM COATED ORAL at 09:07

## 2017-09-09 RX ADMIN — FINASTERIDE 5 MG: 5 TABLET, FILM COATED ORAL at 09:07

## 2017-09-09 RX ADMIN — CARVEDILOL 3.12 MG: 3.12 TABLET, FILM COATED ORAL at 01:07

## 2017-09-09 RX ADMIN — IPRATROPIUM BROMIDE AND ALBUTEROL SULFATE 3 ML: .5; 3 SOLUTION RESPIRATORY (INHALATION) at 19:34

## 2017-09-09 RX ADMIN — INSULIN ASPART 2 UNITS: 100 INJECTION, SOLUTION INTRAVENOUS; SUBCUTANEOUS at 11:55

## 2017-09-09 RX ADMIN — ALPRAZOLAM 0.25 MG: 0.25 TABLET ORAL at 10:07

## 2017-09-09 NOTE — THERAPY EVALUATION
Acute Care - Physical Therapy Initial Evaluation  CARLOS Shane     Patient Name: Thurman Mendel Parsons  : 1950  MRN: [id number]  Today's Date: 2017   Onset of Illness/Injury or Date of Surgery Date: 17     Referring Physician: Vanessa      Admit Date: 2017     Visit Dx:    ICD-10-CM ICD-9-CM   1. Pneumonia of right lower lobe due to infectious organism J18.9 483.8   2. COPD exacerbation J44.1 491.21   3. Acute on chronic respiratory failure with hypercapnia J96.22 518.84   4. Impaired functional mobility and activity tolerance Z74.09 V49.89     Patient Active Problem List   Diagnosis   • Anxiety   • Atherosclerotic heart disease of native coronary artery without angina pectoris   • Atrial fibrillation   • Chronic bronchitis   • Chronic kidney disease, stage III (moderate)   • Steroid-dependent COPD   • Degeneration of cervical intervertebral disc   • Diabetes mellitus   • GERD without esophagitis   • Diverticulosis   • Hemorrhoids   • Hiatal hernia   • Hyperlipidemia   • Hypertension   • Kyphosis, acquired   • Mitral and aortic valve disease   • Phimosis   • Sleep apnea   • Enlarged prostate without lower urinary tract symptoms (luts)   • History of arthritis   • Back pain   • Cardiac pain   • Depression   • Hematuria   • Stroke syndrome   • History of ventricular septal defect   • Neck pain   • COPD exacerbation   • Pneumonia of right lower lobe due to infectious organism     Past Medical History:   Diagnosis Date   • Abnormal liver enzymes    • Acute bronchitis    • Anemia    • Anxiety    • Arthritis    • Atherosclerotic heart disease of native coronary artery without angina pectoris    • Atrial fibrillation    • Atypical chest pain    • Back pain    • BPH (benign prostatic hyperplasia)    • Cardiac pain    • Chronic bronchitis    • Chronic kidney disease    • COPD (chronic obstructive pulmonary disease)    • Degeneration of cervical intervertebral disc    • Depression    • Diabetes mellitus     • Diverticulosis    • Dyspnea    • Esophageal reflux    • Esophagitis    • Gastritis    • Hematuria    • Hemorrhoids    • Hiatal hernia    • History of arthritis    • History of myocardial infarction    • History of peripheral neuropathy    • History of ventricular septal defect    • History of ventricular septal defect    • Hyperlipidemia    • Hypertension    • Infectious diarrhea    • Infectious diarrhea    • Kyphosis, acquired    • Lumbar radiculopathy    • Mitral and aortic valve disease    • Myocardial infarction    • Nephrolithiasis    • Phimosis    • Respiratory failure    • Sleep apnea    • Stroke syndrome    • Stroke syndrome    • Urinary calculus    • Urinary tract infection    • Ventral hernia      Past Surgical History:   Procedure Laterality Date   • HERNIA REPAIR     • SUPRAPUBIC CATHETER INSERTION      History of Percutaneous Catheter Placement Into Ureter   • THROAT SURGERY     • VSD REPAIR      History of VSD Repair By Patch Closure          PT ASSESSMENT (last 72 hours)      PT Evaluation       09/09/17 1255 09/09/17 0800    Rehab Evaluation    Document Type evaluation  -JG     Subjective Information agree to therapy  -JG     Patient Effort, Rehab Treatment good  -JG     General Information    Patient Profile Review yes  -JG     Onset of Illness/Injury or Date of Surgery Date 09/08/17  -JG     Referring Physician Vanessa  -JG     Pertinent History Of Current Problem SOA, COPD, CAD, resp failure, CKD, HTN, LBP  -JG     Prior Level of Function independent:;all household mobility;ADL's  -JG     Equipment Currently Used at Home cane, straight;commode;shower chair;walker, rolling  -JG     Plans/Goals Discussed With patient  -JG     Risks Reviewed patient:;increased discomfort  -JG     Benefits Reviewed patient:;decrease pain;increase independence  -JG     Living Environment    Lives With alone  -JG alone  -SP    Living Arrangements house  -JG house  -SP    Home Accessibility bed and bath on same  level  -JG stairs (1 railing present);stairs to enter home  -SP    Number of Stairs to Enter Home 1  -JG 2  -SP    Stair Railings at Home  outside, present on right side  -SP    Type of Financial/Environmental Concern  none  -SP    Transportation Available  car;family or friend will provide  -SP    Clinical Impression    Criteria for Skilled Therapeutic Interventions Met does not meet criteria for skilled intervention  -JG     Pain Assessment    Pain Assessment No/denies pain  -JG     Cognitive Assessment/Intervention    Orientation Status oriented x 4  -JG     ROM (Range of Motion)    General ROM no range of motion deficits identified  -G     MMT (Manual Muscle Testing)    General MMT Assessment no strength deficits identified  -G     Bed Mobility, Assessment/Treatment    Bed Mob, Supine to Sit, [place] independent  -     Transfer Assessment/Treatment    Transfers, Sit-Stand [place] independent  -     Gait Assessment/Treatment    Gait, [place] Level supervision required  -G     Gait, Assistive Device rolling walker  -     Gait, Distance (Feet) 10  -JG     Positioning and Restraints    Pre-Treatment Position in bed  -JG     Post Treatment Position chair  -G     In Chair reclined;call light within reach;with other staff  -       09/09/17 0400 09/09/17 0000    General Information    Equipment Currently Used at Home bipap/ cpap;walker, rolling;cane, straight  -TN     Muscle Tone Assessment    Muscle Tone Assessment Bilateral Lower Extremities  -TN Bilateral Lower Extremities  -TN    Bilateral Lower Extremities Muscle Tone Assessment mildly decreased tone  -TN mildly decreased tone  -TN      09/08/17 2115       Muscle Tone Assessment    Muscle Tone Assessment Bilateral Lower Extremities  -TN     Bilateral Lower Extremities Muscle Tone Assessment mildly decreased tone  -TN       User Key  (r) = Recorded By, (t) = Taken By, (c) = Cosigned By    Initials Name Provider Type    BIANCA Brennan, PT  Physical Therapist    TN Toshia Chang, RN Registered Nurse    JUNE Duron, RN Registered Nurse          Physical Therapy Education     Title: PT OT SLP Therapies (Done)     Topic: Physical Therapy (Done)     Point: Mobility training (Done)    Learning Progress Summary    Learner Readiness Method Response Comment Documented by Status   Patient Acceptance E VU talked with pt about safety with mobility. Advised to get assist from nursing when walking secondary to lines and O2.  09/09/17 1431 Done    Acceptance E VU,NR LACE TEACHING  ROOM ORIENTATION  SMOKING CESSATION TN 09/09/17 0215 Done   Family Acceptance E VU,NR LACE TEACHING  ROOM ORIENTATION  SMOKING CESSATION TN 09/09/17 0215 Done               Point: Home exercise program (Done)    Learning Progress Summary    Learner Readiness Method Response Comment Documented by Status   Patient Acceptance E VU talked with pt about safety with mobility. Advised to get assist from nursing when walking secondary to lines and O2.  09/09/17 1431 Done    Acceptance E VU,NR LACE TEACHING  ROOM ORIENTATION  SMOKING CESSATION TN 09/09/17 0215 Done   Family Acceptance E VU,NR LACE TEACHING  ROOM ORIENTATION  SMOKING CESSATION TN 09/09/17 0215 Done               Point: Body mechanics (Done)    Learning Progress Summary    Learner Readiness Method Response Comment Documented by Status   Patient Acceptance E VU talked with pt about safety with mobility. Advised to get assist from nursing when walking secondary to lines and O2.  09/09/17 1431 Done    Acceptance E VU,NR LACE TEACHING  ROOM ORIENTATION  SMOKING CESSATION TN 09/09/17 0215 Done   Family Acceptance E VU,NR LACE TEACHING  ROOM ORIENTATION  SMOKING CESSATION TN 09/09/17 0215 Done               Point: Precautions (Done)    Learning Progress Summary    Learner Readiness Method Response Comment Documented by Status   Patient Acceptance E VU talked with pt about safety with mobility. Advised to get assist from  nursing when walking secondary to lines and O2. J 09/09/17 1431 Done    Acceptance E VU,NR LACE TEACHING  ROOM ORIENTATION  SMOKING CESSATION TN 09/09/17 0215 Done   Family Acceptance E VU,NR LACE TEACHING  ROOM ORIENTATION  SMOKING CESSATION TN 09/09/17 0215 Done                      User Key     Initials Effective Dates Name Provider Type Discipline     04/06/17 -  Sarkis Brennan PT Physical Therapist PT    TN 10/26/16 -  Toshia Chang, RN Registered Nurse Nurse                PT Recommendation and Plan     Plan of Care Review  Plan Of Care Reviewed With: patient  Progress:  (pt seen for initial evaluation today)  Outcome Summary/Follow up Plan: Pt was seen for initial evaluation. He was able to perform all mobility and transfers with independence using RW. He does not require skilled PT at this time. Encouraged him to continue sitting up in the chair and walking with assist from nursing as needed for lines, O2.               Outcome Measures       09/09/17 1400          How much help from another person do you currently need...    Turning from your back to your side while in flat bed without using bedrails? 4  -JG      Moving from lying on back to sitting on the side of a flat bed without bedrails? 4  -JG      Moving to and from a bed to a chair (including a wheelchair)? 4  -JG      Standing up from a chair using your arms (e.g., wheelchair, bedside chair)? 4  -JG      Climbing 3-5 steps with a railing? 4  -JG      To walk in hospital room? 4  -JG      AM-PAC 6 Clicks Score 24  -JG      Functional Assessment    Outcome Measure Options AM-PAC 6 Clicks Basic Mobility (PT)  -J        User Key  (r) = Recorded By, (t) = Taken By, (c) = Cosigned By    Initials Name Provider Type     Sarkis Brennan, PT Physical Therapist           Time Calculation:         PT Charges       09/09/17 1434          Time Calculation    Start Time 1255  -JG      PT Received On 09/09/17  -BIANCA        User Key  (r) = Recorded By, (t) =  Taken By, (c) = Cosigned By    Initials Name Provider Type    JMARYCRUZ Brennan PT Physical Therapist          Therapy Charges for Today     Code Description Service Date Service Provider Modifiers Qty    51824776147 HC PT EVAL MOD COMPLEXITY 4 9/9/2017 Sarkis Brennan PT GP 1          PT G-Codes  Outcome Measure Options: AM-PAC 6 Clicks Basic Mobility (PT)      Sarkis Brennan PT  9/9/2017

## 2017-09-09 NOTE — PLAN OF CARE
Problem: NPPV/CPAP (Adult)  Intervention: Monitor/Manage Anxiety Related to NPPV/CPAP    09/09/17 0212   Coping Strategies   Trust Relationship/Rapport care explained;choices provided       Intervention: Prevent Aspiration During Therapy    09/09/17 0212   Positioning   Head Of Bed (HOB) Position HOB elevated         Goal: Signs and Symptoms of Listed Potential Problems Will be Absent or Manageable (NPPV/CPAP)  Outcome: Ongoing (interventions implemented as appropriate)    09/09/17 0212   NPPV/CPAP   Problems Assessed (NPPV/CPAP) hypoxia/hypoxemia;dry mucous membranes;situational response;skin breakdown   Problems Present (NPPV/CPAP) none

## 2017-09-09 NOTE — H&P
[facility]   HISTORY AND PHYSICAL      Name:  Thurman Mendel Parsons   Age:  67 y.o.  Sex:  male  :  1950  MRN:  [id number]   Visit Number:  [id number]  Admission Date:  2017  Date Of Service:  17  Primary Care Physician:  Miguel Rojas MD    History Obtained From:    patient    Chief Complaint:     Shortness of breath     History Of Presenting Illness:      Patient is a 67-year-old  male with a history diabetes type 2, COPD, coronary artery disease, and PEDRO noncompliant with CPAP was brought in by his brother via private vehicle.  Starting yesterday he had progressive shortness of breath which has become severe.  Nothing was making it better or worse.  He was treated with an antibiotic doxycycline the last few weeks as well as steroids, he has not really improved.  He had O2 sat in the 80s on admission was placed on a nonrebreather.  A blood gas was checked showing a PCO2 of 97 so was placed on BiPAP.  He has improved after that.  He states he has some nausea with no vomiting.  He denies any fevers or chills.  He is coughing up yellowish phlegm.  He denies any chest pressure.  He has new onset of low back pain for the past 3 or 4 days.  He denies any loss of bowel or bladder function.  He also has a headache from wearing the BiPAP.  He has a history of COPD and follows with Dr. Lopez.  He states his diabetes is borderline.  He has had no problems with his heart recently, although he does have a history of MI with coronary artery disease.  He has a chronic cough but is worsened dramatically at this point.  His PCO2 did come down to 67 while wearing the BiPAP.  His brother states he was very lethargic and sleepy when he picked him up.  This is improved dramatically as well.    Review Of Systems:     General ROS: negative  Psychological ROS: negative  Ophthalmic ROS: negative  ENT ROS: negative  Allergy and Immunology ROS: negative  Hematological and Lymphatic  ROS: negative  Endocrine ROS: negative  Breast ROS: negative  Respiratory ROS: positive for - cough, shortness of breath and sputum changes  Cardiovascular ROS: negative  Gastrointestinal ROS: positive for - nausea/vomiting  Genito-Urinary ROS: negative  Musculoskeletal ROS: positive for - muscular weakness  Neurological ROS: positive for - confusion  Dermatological ROS: negative       Past Medical History:    Diabetes type 2, VSD, MI, PEDRO noncompliant with CPAP, TIA, chronic kidney disease stage III, COPD, coronary artery disease, hypertension, GERD, hyperlipidemia, BPH    Past Surgical history:    Hernia repair, suprapubic catheter placement and removal, UPPP surgery, VSD repair, left total hip arthroplasty      Social History:    He is a smoker for over 50 years, denies alcohol or drugs.  He lives alone, still drives, walks with a walker on occasion.  He is on home oxygen at 5 L.  He is a full code    Family History:    Father had COPD, coronary artery disease with CABG.  Brother had prostate cancer.  Mother is alive and well at 94.    Allergies:      Sulfa antibiotics    Home Medications:    Prior to Admission Medications     Prescriptions Last Dose Informant Patient Reported? Taking?    albuterol (PROVENTIL) (2.5 MG/3ML) 0.083% nebulizer solution 9/8/2017  Yes No    Albuterol Sulfate (2.5 MG/3ML) 0.083% Inhalation Nebulization Solution; Patient Sig: Albuterol Sulfate (2.5 MG/3ML) 0.083% Inhalation Nebulization Solution USE 1 UNIT DOSE EVERY 4-6 HOURS AS NEEDED FOR WHEEZING .; 3; 5; 04-Sep-2012; Active    alfuzosin (UROXATRAL) 10 MG 24 hr tablet 9/8/2017  No No    Take 1 tablet by mouth Daily.    ALPRAZolam (XANAX) 0.5 MG tablet 9/7/2017  No No    Take 1 tablet by mouth At Night As Needed for Anxiety.    amLODIPine (NORVASC) 5 MG tablet 9/8/2017  No No    TAKE ONE TABLET BY MOUTH DAILY    atorvastatin (LIPITOR) 40 MG tablet 9/7/2017  No No    Take 1 tablet by mouth Daily.    BREO ELLIPTA 200-25 MCG/INH aerosol  powder  9/8/2017  Yes No    carvedilol (COREG) 3.125 MG tablet 9/8/2017  No No    TAKE ONE TABLET BY MOUTH TWICE A DAY WITH MEALS    clopidogrel (PLAVIX) 75 MG tablet 9/8/2017  No No    Take 1 tablet by mouth Daily.    cyclobenzaprine (FLEXERIL) 10 MG tablet 9/7/2017  No No    TAKE ONE TABLET BY MOUTH EVERY NIGHT AT BEDTIME AS NEEDED    doxycycline (VIBRAMYCIN) 100 MG capsule   No No    Take 1 capsule by mouth 2 (Two) Times a Day.    escitalopram (LEXAPRO) 10 MG tablet   No No    TAKE ONE TABLET BY MOUTH DAILY    escitalopram (LEXAPRO) 10 MG tablet 9/8/2017  No No    Take 1 tablet by mouth Daily.    Patient taking differently:  Take 10 mg by mouth Every Evening.    finasteride (PROSCAR) 5 MG tablet 9/8/2017  Yes No    fluticasone (FLONASE) 50 MCG/ACT nasal spray 9/8/2017  Yes No    1 spray Daily.    losartan (COZAAR) 100 MG tablet   No No    Take 1 tablet by mouth daily.    nitroglycerin (NITROSTAT) 0.4 MG SL tablet   Yes Yes    Place  under the tongue.    oxybutynin (DITROPAN) 5 MG tablet 9/7/2017  No No    TAKE ONE TABLET BY MOUTH AT BEDTIME    predniSONE (DELTASONE) 10 MG tablet 9/8/2017  Yes No    Take 10 mg by mouth Daily.    PROAIR  (90 BASE) MCG/ACT inhaler 9/8/2017  Yes No    Daily.             Hospital Scheduled Meds:      [START ON 9/9/2017] amLODIPine 5 mg Oral Q24H   [START ON 9/9/2017] atorvastatin 40 mg Oral Daily   azithromycin 500 mg Intravenous Q24H   carvedilol 3.125 mg Oral BID With Meals   ceftriaxone 1 g Intravenous Q24H   [START ON 9/9/2017] clopidogrel 75 mg Oral Daily   [START ON 9/9/2017] enoxaparin 40 mg Subcutaneous Daily   escitalopram 10 mg Oral Nightly   [START ON 9/9/2017] finasteride 5 mg Oral Daily   [START ON 9/9/2017] guaiFENesin 1,200 mg Oral BID   insulin aspart 0-7 Units Subcutaneous 4x Daily AC & at Bedtime   [START ON 9/9/2017] ipratropium-albuterol 3 mL Nebulization Q6H - RT   [START ON 9/9/2017] losartan 100 mg Oral Daily   methylPREDNISolone sodium succinate 40 mg  Intravenous Q8H   tamsulosin 0.4 mg Oral Nightly         sodium chloride 75 mL/hr    sodium chloride 75 mL/hr Last Rate: 75 mL/hr (09/08/17 1456)        Vital Signs:    Temp:  [98.2 °F (36.8 °C)-98.3 °F (36.8 °C)] 98.3 °F (36.8 °C)  Heart Rate:  [] 96  Resp:  [18-24] 24  BP: (108-167)/() 141/74    Last 3 weights    09/08/17  1250 09/08/17 2117   Weight: 137 lb (62.1 kg) 148 lb 7 oz (67.3 kg)       Body mass index is 26.29 kg/(m^2).    Physical Exam:      General Appearance:    Alert, cooperative, in no acute distress   Head:    Normocephalic, without obvious abnormality, atraumatic   Eyes:            Lids and lashes normal, conjunctivae and sclerae normal, no   icterus, no pallor, corneas clear, PERRLA   Ears:    Ears appear intact with no abnormalities noted   Throat:   No oral lesions, no thrush, oral mucosa moist   Neck:   No adenopathy, supple, trachea midline, no thyromegaly, no   carotid bruit, no JVD   Back:     No kyphosis present, no scoliosis present, no skin lesions,      erythema or scars, no tenderness to percussion or                   palpation,   range of motion normal   Lungs:     Rhonchi bilaterally without uses accessory muscles respiration.      Heart:    Regular rhythm and normal rate, normal S1 and S2, no            murmur, no gallop, no rub, no click   Chest Wall:    No abnormalities observed   Abdomen:     Normal bowel sounds, no masses, no organomegaly, soft        non-tender, non-distended, no guarding, no rebound                tenderness   Rectal:     Deferred   Extremities:   Moves all extremities well, no edema, no cyanosis, no             redness   Pulses:   Pulses palpable and equal bilaterally   Skin:   No bleeding, bruising or rash   Lymph nodes:   No palpable adenopathy   Neurologic:   Cranial nerves 2 - 12 grossly intact, sensation intact, DTR       present and equal bilaterally         EKG:      Right bundle branch block, normal sinus rhythm    Telemetry:      Normal  sinus rhythm    I have personally looked at both the EKG and the telemetry strips.    Labs:    Results from last 7 days  Lab Units 09/08/17  1310 09/08/17  1300   LACTATE mmol/L 0.6  --    WBC 10*3/mm3  --  15.84*   HEMOGLOBIN g/dL  --  12.2*   HEMATOCRIT %  --  39.8*   MCV fL  --  95.9*   MCHC g/dL  --  30.7   PLATELETS 10*3/mm3  --  175       Results from last 7 days  Lab Units 09/08/17  1808   PH, ARTERIAL pH units 7.412  7.412   PO2 ART mm Hg 87.0  87.0   PCO2, ARTERIAL mm Hg 67.6*  67.6*   HCO3 ART mmol/L 43.0*  43.0*       Results from last 7 days  Lab Units 09/08/17  1300   SODIUM mmol/L 145   POTASSIUM mmol/L 4.4   CHLORIDE mmol/L 96*   CO2 mmol/L 42.0*   BUN mg/dL 26*   CREATININE mg/dL 1.10   EGFR IF NONAFRICN AM mL/min/1.73 67   CALCIUM mg/dL 10.5*   GLUCOSE mg/dL 127*   ALBUMIN g/dL 3.90   BILIRUBIN mg/dL 1.3   ALK PHOS U/L 90   AST (SGOT) U/L 31   ALT (SGPT) U/L 37   Estimated Creatinine Clearance: 62 mL/min (by C-G formula based on Cr of 1.1).  No results found for: AMMONIA    Results from last 7 days  Lab Units 09/08/17  1300   TROPONIN I ng/mL 0.077*       Results from last 7 days  Lab Units 09/08/17  1300   PROBNP pg/mL 1090.0*     Lab Results   Component Value Date    HGBA1C 5.30 04/13/2017     Lab Results   Component Value Date    TSH 0.721 02/21/2017     No results found for: PREGTESTUR, PREGSERUM, HCG, HCGQUANT  Pain Management Panel     There is no flowsheet data to display.                          Radiology:    Imaging Results (last 7 days)     Procedure Component Value Units Date/Time    XR Chest 1 View [[id number]] Collected:  09/08/17 1320     Updated:  09/08/17 1323    Narrative:       PROCEDURE: XR CHEST 1 VW-     HISTORY: sob     COMPARISON: August 1, 2017.     FINDINGS: The heart is normal in size. The mediastinum is unremarkable.  There are low lung volumes with a right basilar opacity which may  reflect atelectasis or pneumonia. There is no pneumothorax.  There are  no acute  osseous abnormalities.           Impression:       Low lung volumes with a right basilar opacity which may  reflect atelectasis or pneumonia.     Continued followup is recommended.     This report was finalized on 9/8/2017 1:20 PM by Richelle Patel M.D..          Assessment:    1.  Right lower lobe opacity just with pneumonia, present on admission.  2.  Acute on chronic respiratory failure with hypercapnia   3.  COPD with exacerbation  4.  PEDRO noncompliant with CPAP  5.  Diabetes type 2, borderline  6.  History of coronary artery disease/MI  7.  Chronic kidney disease stage III  8.  Hypertension  9.  BPH  10.  Low back pain      Plan:     We'll place on DuoNeb.  Give Rocephin and Zithromax.  BiPAP at night.  Solu-Medrol 40 mg every 8 hours.  We'll add guaifenesin and Tussionex.  X-ray is lower spine as he has low back pain.  He would be high risk for compression fracture.  We'll consult PT and OT.  Tylenol and Ultram for pain.  Anticipated stay is greater than 2 midnights.  If the patient should worsen, would consult pulmonary services.  Further recommendations will depend on the clinical course.    Joshua Mendez,   09/08/17  10:13 PM

## 2017-09-09 NOTE — PAYOR COMM NOTE
"Inpatient notification  Chayo Gonzalez RN   [phone] fax    Penaons, Thurman Mendel (67 y.o. Male)     Date of Birth Social Security Number Address Home Phone MRN    1950  [address] [facility] [phone] [id number]    Muslim Marital Status          None        Admission Date Admission Type Admitting Provider Attending Provider Department, Room/Bed    9/8/17 Emergency Joshua Mendez DO Gaspar, Daniel F., MD [facility] MED SURG  3, 325/1    Discharge Date Discharge Disposition Discharge Destination                      Attending Provider: Edward Davis MD     Allergies:  Sulfa Antibiotics    Isolation:  None   Infection:  None   Code Status:  FULL    Ht:  63\" (160 cm)   Wt:  148 lb 7 oz (67.3 kg)    Admission Cmt:  None   Principal Problem:  None                Active Insurance as of 9/8/2017     Primary Coverage     Payor Plan Insurance Group Employer/Plan Group    [facility] MEDICARE REPLACEMENT [facility] [id number]     Payor Plan Address Payor Plan Phone Number Effective From Effective To    [address] [address]  1/1/2016     [place] [address]       Subscriber Name Subscriber Birth Date Member ID       KARINA PENA MENDEL 1950 [id number]           Secondary Coverage     Payor Plan Insurance Group Employer/Plan Group    MEDICARE MEDICARE A & B      Payor Plan Address Payor Plan Phone Number Effective From Effective To    [address] [phone] 9/1/2017     [place] [address]       Subscriber Name Subscriber Birth Date Member ID       PARSONS,THURMAN MENDEL 1950 [id number]                 Emergency Contacts      (Rel.) Home Phone Work Phone Mobile Phone    Edward Easton -- -- [phone]    Jean Marie Pena -- -- [phone]    April Easton -- -- [phone]    JeanMinda (Mother) [phone] -- --            Insurance Information                [facility] MEDICARE " [facility] Phone:     Subscriber: Parsons, Thurman Mendel Subscriber#: [id number]    Group#: [id number] Precert#:         MEDICARE/MEDICARE A & B Phone: [phone]    Subscriber: Parsons, Thurman Mendel Subscriber#: [id number]    Group#:  Precert#:              History & Physical      Joshua DEVRIES DO Vanessa at 2017 10:13 PM              [facility]   HISTORY AND PHYSICAL      Name:  Thurman Mendel Parsons   Age:  67 y.o.  Sex:  male  :  1950  MRN:  [id number]   Visit Number:  [id number]  Admission Date:  2017  Date Of Service:  17  Primary Care Physician:  Miguel Rojas MD    History Obtained From:    patient    Chief Complaint:     Shortness of breath     History Of Presenting Illness:      Patient is a 67-year-old  male with a history diabetes type 2, COPD, coronary artery disease, and PEDRO noncompliant with CPAP was brought in by his brother via private vehicle.  Starting yesterday he had progressive shortness of breath which has become severe.  Nothing was making it better or worse.  He was treated with an antibiotic doxycycline the last few weeks as well as steroids, he has not really improved.  He had O2 sat in the 80s on admission was placed on a nonrebreather.  A blood gas was checked showing a PCO2 of 97 so was placed on BiPAP.  He has improved after that.  He states he has some nausea with no vomiting.  He denies any fevers or chills.  He is coughing up yellowish phlegm.  He denies any chest pressure.  He has new onset of low back pain for the past 3 or 4 days.  He denies any loss of bowel or bladder function.  He also has a headache from wearing the BiPAP.  He has a history of COPD and follows with Dr. Lopez.  He states his diabetes is borderline.  He has had no problems with his heart recently, although he does have a history of MI with coronary artery disease.  He has a chronic cough but is worsened dramatically at this point.  His  PCO2 did come down to 67 while wearing the BiPAP.  His brother states he was very lethargic and sleepy when he picked him up.  This is improved dramatically as well.    Review Of Systems:     General ROS: negative  Psychological ROS: negative  Ophthalmic ROS: negative  ENT ROS: negative  Allergy and Immunology ROS: negative  Hematological and Lymphatic ROS: negative  Endocrine ROS: negative  Breast ROS: negative  Respiratory ROS: positive for - cough, shortness of breath and sputum changes  Cardiovascular ROS: negative  Gastrointestinal ROS: positive for - nausea/vomiting  Genito-Urinary ROS: negative  Musculoskeletal ROS: positive for - muscular weakness  Neurological ROS: positive for - confusion  Dermatological ROS: negative       Past Medical History:    Diabetes type 2, VSD, MI, PEDRO noncompliant with CPAP, TIA, chronic kidney disease stage III, COPD, coronary artery disease, hypertension, GERD, hyperlipidemia, BPH    Past Surgical history:    Hernia repair, suprapubic catheter placement and removal, UPPP surgery, VSD repair, left total hip arthroplasty      Social History:    He is a smoker for over 50 years, denies alcohol or drugs.  He lives alone, still drives, walks with a walker on occasion.  He is on home oxygen at 5 L.  He is a full code    Family History:    Father had COPD, coronary artery disease with CABG.  Brother had prostate cancer.  Mother is alive and well at 94.    Allergies:      Sulfa antibiotics    Home Medications:    Prior to Admission Medications     Prescriptions Last Dose Informant Patient Reported? Taking?    albuterol (PROVENTIL) (2.5 MG/3ML) 0.083% nebulizer solution 9/8/2017  Yes No    Albuterol Sulfate (2.5 MG/3ML) 0.083% Inhalation Nebulization Solution; Patient Sig: Albuterol Sulfate (2.5 MG/3ML) 0.083% Inhalation Nebulization Solution USE 1 UNIT DOSE EVERY 4-6 HOURS AS NEEDED FOR WHEEZING .; 3; 5; 04-Sep-2012; Active    alfuzosin (UROXATRAL) 10 MG 24 hr tablet 9/8/2017  No No     Take 1 tablet by mouth Daily.    ALPRAZolam (XANAX) 0.5 MG tablet 9/7/2017  No No    Take 1 tablet by mouth At Night As Needed for Anxiety.    amLODIPine (NORVASC) 5 MG tablet 9/8/2017  No No    TAKE ONE TABLET BY MOUTH DAILY    atorvastatin (LIPITOR) 40 MG tablet 9/7/2017  No No    Take 1 tablet by mouth Daily.    BREO ELLIPTA 200-25 MCG/INH aerosol powder  9/8/2017  Yes No    carvedilol (COREG) 3.125 MG tablet 9/8/2017  No No    TAKE ONE TABLET BY MOUTH TWICE A DAY WITH MEALS    clopidogrel (PLAVIX) 75 MG tablet 9/8/2017  No No    Take 1 tablet by mouth Daily.    cyclobenzaprine (FLEXERIL) 10 MG tablet 9/7/2017  No No    TAKE ONE TABLET BY MOUTH EVERY NIGHT AT BEDTIME AS NEEDED    doxycycline (VIBRAMYCIN) 100 MG capsule   No No    Take 1 capsule by mouth 2 (Two) Times a Day.    escitalopram (LEXAPRO) 10 MG tablet   No No    TAKE ONE TABLET BY MOUTH DAILY    escitalopram (LEXAPRO) 10 MG tablet 9/8/2017  No No    Take 1 tablet by mouth Daily.    Patient taking differently:  Take 10 mg by mouth Every Evening.    finasteride (PROSCAR) 5 MG tablet 9/8/2017  Yes No    fluticasone (FLONASE) 50 MCG/ACT nasal spray 9/8/2017  Yes No    1 spray Daily.    losartan (COZAAR) 100 MG tablet   No No    Take 1 tablet by mouth daily.    nitroglycerin (NITROSTAT) 0.4 MG SL tablet   Yes Yes    Place  under the tongue.    oxybutynin (DITROPAN) 5 MG tablet 9/7/2017  No No    TAKE ONE TABLET BY MOUTH AT BEDTIME    predniSONE (DELTASONE) 10 MG tablet 9/8/2017  Yes No    Take 10 mg by mouth Daily.    PROAIR  (90 BASE) MCG/ACT inhaler 9/8/2017  Yes No    Daily.             Hospital Scheduled Meds:      [START ON 9/9/2017] amLODIPine 5 mg Oral Q24H   [START ON 9/9/2017] atorvastatin 40 mg Oral Daily   azithromycin 500 mg Intravenous Q24H   carvedilol 3.125 mg Oral BID With Meals   ceftriaxone 1 g Intravenous Q24H   [START ON 9/9/2017] clopidogrel 75 mg Oral Daily   [START ON 9/9/2017] enoxaparin 40 mg Subcutaneous Daily   escitalopram  10 mg Oral Nightly   [START ON 9/9/2017] finasteride 5 mg Oral Daily   [START ON 9/9/2017] guaiFENesin 1,200 mg Oral BID   insulin aspart 0-7 Units Subcutaneous 4x Daily AC & at Bedtime   [START ON 9/9/2017] ipratropium-albuterol 3 mL Nebulization Q6H - RT   [START ON 9/9/2017] losartan 100 mg Oral Daily   methylPREDNISolone sodium succinate 40 mg Intravenous Q8H   tamsulosin 0.4 mg Oral Nightly         sodium chloride 75 mL/hr    sodium chloride 75 mL/hr Last Rate: 75 mL/hr (09/08/17 1456)        Vital Signs:    Temp:  [98.2 °F (36.8 °C)-98.3 °F (36.8 °C)] 98.3 °F (36.8 °C)  Heart Rate:  [] 96  Resp:  [18-24] 24  BP: (108-167)/() 141/74    Last 3 weights    09/08/17  1250 09/08/17  2117   Weight: 137 lb (62.1 kg) 148 lb 7 oz (67.3 kg)       Body mass index is 26.29 kg/(m^2).    Physical Exam:      General Appearance:    Alert, cooperative, in no acute distress   Head:    Normocephalic, without obvious abnormality, atraumatic   Eyes:            Lids and lashes normal, conjunctivae and sclerae normal, no   icterus, no pallor, corneas clear, PERRLA   Ears:    Ears appear intact with no abnormalities noted   Throat:   No oral lesions, no thrush, oral mucosa moist   Neck:   No adenopathy, supple, trachea midline, no thyromegaly, no   carotid bruit, no JVD   Back:     No kyphosis present, no scoliosis present, no skin lesions,      erythema or scars, no tenderness to percussion or                   palpation,   range of motion normal   Lungs:     Rhonchi bilaterally without uses accessory muscles respiration.      Heart:    Regular rhythm and normal rate, normal S1 and S2, no            murmur, no gallop, no rub, no click   Chest Wall:    No abnormalities observed   Abdomen:     Normal bowel sounds, no masses, no organomegaly, soft        non-tender, non-distended, no guarding, no rebound                tenderness   Rectal:     Deferred   Extremities:   Moves all extremities well, no edema, no cyanosis, no              redness   Pulses:   Pulses palpable and equal bilaterally   Skin:   No bleeding, bruising or rash   Lymph nodes:   No palpable adenopathy   Neurologic:   Cranial nerves 2 - 12 grossly intact, sensation intact, DTR       present and equal bilaterally         EKG:      Right bundle branch block, normal sinus rhythm    Telemetry:      Normal sinus rhythm    I have personally looked at both the EKG and the telemetry strips.    Labs:    Results from last 7 days  Lab Units 09/08/17  1310 09/08/17  1300   LACTATE mmol/L 0.6  --    WBC 10*3/mm3  --  15.84*   HEMOGLOBIN g/dL  --  12.2*   HEMATOCRIT %  --  39.8*   MCV fL  --  95.9*   MCHC g/dL  --  30.7   PLATELETS 10*3/mm3  --  175       Results from last 7 days  Lab Units 09/08/17  1808   PH, ARTERIAL pH units 7.412  7.412   PO2 ART mm Hg 87.0  87.0   PCO2, ARTERIAL mm Hg 67.6*  67.6*   HCO3 ART mmol/L 43.0*  43.0*       Results from last 7 days  Lab Units 09/08/17  1300   SODIUM mmol/L 145   POTASSIUM mmol/L 4.4   CHLORIDE mmol/L 96*   CO2 mmol/L 42.0*   BUN mg/dL 26*   CREATININE mg/dL 1.10   EGFR IF NONAFRICN AM mL/min/1.73 67   CALCIUM mg/dL 10.5*   GLUCOSE mg/dL 127*   ALBUMIN g/dL 3.90   BILIRUBIN mg/dL 1.3   ALK PHOS U/L 90   AST (SGOT) U/L 31   ALT (SGPT) U/L 37   Estimated Creatinine Clearance: 62 mL/min (by C-G formula based on Cr of 1.1).  No results found for: AMMONIA    Results from last 7 days  Lab Units 09/08/17  1300   TROPONIN I ng/mL 0.077*       Results from last 7 days  Lab Units 09/08/17  1300   PROBNP pg/mL 1090.0*     Lab Results   Component Value Date    HGBA1C 5.30 04/13/2017     Lab Results   Component Value Date    TSH 0.721 02/21/2017     No results found for: PREGTESTUR, PREGSERUM, HCG, HCGQUANT  Pain Management Panel     There is no flowsheet data to display.                          Radiology:    Imaging Results (last 7 days)     Procedure Component Value Units Date/Time    XR Chest 1 View [[id number]] Collected:  09/08/17 8746      Updated:  09/08/17 1323    Narrative:       PROCEDURE: XR CHEST 1 VW-     HISTORY: sob     COMPARISON: August 1, 2017.     FINDINGS: The heart is normal in size. The mediastinum is unremarkable.  There are low lung volumes with a right basilar opacity which may  reflect atelectasis or pneumonia. There is no pneumothorax.  There are  no acute osseous abnormalities.           Impression:       Low lung volumes with a right basilar opacity which may  reflect atelectasis or pneumonia.     Continued followup is recommended.     This report was finalized on 9/8/2017 1:20 PM by Richelle Patel M.D..          Assessment:    1.  Right lower lobe opacity just with pneumonia, present on admission.  2.  Acute on chronic respiratory failure with hypercapnia   3.  COPD with exacerbation  4.  PEDRO noncompliant with CPAP  5.  Diabetes type 2, borderline  6.  History of coronary artery disease/MI  7.  Chronic kidney disease stage III  8.  Hypertension  9.  BPH  10.  Low back pain      Plan:     We'll place on DuoNeb.  Give Rocephin and Zithromax.  BiPAP at night.  Solu-Medrol 40 mg every 8 hours.  We'll add guaifenesin and Tussionex.  X-ray is lower spine as he has low back pain.  He would be high risk for compression fracture.  We'll consult PT and OT.  Tylenol and Ultram for pain.  Anticipated stay is greater than 2 midnights.  If the patient should worsen, would consult pulmonary services.  Further recommendations will depend on the clinical course.    Joshua Mendez DO  09/08/17  10:13 PM     Electronically signed by Joshua Mendez DO at 9/8/2017 10:22 PM           Emergency Department Notes      Bigg Campos MD at 9/8/2017  6:28 PM          Subjective   HPI Comments: Patient is a 67-year-old male presents for shortness of breath.  His brother brought him in via private vehicle.  At this time the patient cannot answer questions due to his respiratory distress.  Brother states his shortness of breath started  yesterday and has progressively worsened.  Currently nothing makes this better or worse.  Brother does state that he has been treated with antibiotics in the past few weeks for respiratory infection and he is concerned that he has pneumonia.      Review of Systems   All other systems reviewed and are negative.      Past Medical History:   Diagnosis Date   • Abnormal liver enzymes    • Acute bronchitis    • Anemia    • Anxiety    • Arthritis    • Atherosclerotic heart disease of native coronary artery without angina pectoris    • Atrial fibrillation    • Atypical chest pain    • Back pain    • BPH (benign prostatic hyperplasia)    • Cardiac pain    • Chronic bronchitis    • Chronic kidney disease    • COPD (chronic obstructive pulmonary disease)    • Degeneration of cervical intervertebral disc    • Depression    • Diabetes mellitus    • Diverticulosis    • Dyspnea    • Esophageal reflux    • Esophagitis    • Gastritis    • Hematuria    • Hemorrhoids    • Hiatal hernia    • History of arthritis    • History of myocardial infarction    • History of peripheral neuropathy    • History of ventricular septal defect    • History of ventricular septal defect    • Hyperlipidemia    • Hypertension    • Infectious diarrhea    • Infectious diarrhea    • Kyphosis, acquired    • Lumbar radiculopathy    • Mitral and aortic valve disease    • Myocardial infarction    • Nephrolithiasis    • Phimosis    • Respiratory failure    • Sleep apnea    • Stroke syndrome    • Stroke syndrome    • Urinary calculus    • Urinary tract infection    • Ventral hernia        Allergies   Allergen Reactions   • Sulfa Antibiotics Other (See Comments), Nausea Only and Nausea And Vomiting       Past Surgical History:   Procedure Laterality Date   • HERNIA REPAIR     • SUPRAPUBIC CATHETER INSERTION      History of Percutaneous Catheter Placement Into Ureter   • THROAT SURGERY     • VSD REPAIR      History of VSD Repair By Patch Closure       Family  History   Problem Relation Age of Onset   • Other Father      CABG   • Coronary artery disease Father        Social History     Social History   • Marital status:      Spouse name: N/A   • Number of children: N/A   • Years of education: N/A     Occupational History   •  Retired     Social History Main Topics   • Smoking status: Former Smoker   • Smokeless tobacco: Never Used   • Alcohol use No   • Drug use: No   • Sexual activity: Defer     Other Topics Concern   • None     Social History Narrative           Objective   Physical Exam   Nursing note and vitals reviewed.    GEN: Patient presents in severe respiratory distress unable to complete sentences  Head: Normocephalic, atraumatic  Eyes: Pupils equal round reactive to light  ENT: oral mucosa is moist  Chest: Nontender to palpation  Cardiovascular: Regular rate  Lungs: Tachypneic with a rate in the low 20s with accessory muscle use, Coarse breath sounds at bilateral bases with diminished breath sounds on the right   Abdomen: Soft, nontender, nondistended, no peritoneal signs  Extremities: No edema, normal appearance  Neuro: GCS 15  Psych: Mood and affect are appropriate    Procedures        ED Course  ED Course   Comment By Time   Dr. Mendez graciously accepted the patient for admission. Bigg Campos MD 09/08 7274                  [facility]  Number of Diagnoses or Management Options  Acute on chronic respiratory failure with hypercapnia:   COPD exacerbation:   Pneumonia of right lower lobe due to infectious organism:   Diagnosis management comments: Greater than 40 minutes critical care time was spent by the attending physician excluding separate billable procedures    Patient initially had an oxygen saturations in the 40% range when in triage.  He was quickly placed on oxygen nonrebreather which did help with his oxygenation.  Blood gas was suggestive for COPD with hypercapnia.  Physical exam and chest x-ray more suggestive for pneumonia.  Cultures were  drawn and antibiotics initiated in the emergency department.  He did significantly improve with BiPAP treatment.       Amount and/or Complexity of Data Reviewed  Clinical lab tests: ordered and reviewed  Discussion of test results with the performing providers: yes  Decide to obtain previous medical records or to obtain history from someone other than the patient: yes  Obtain history from someone other than the patient: yes  Review and summarize past medical records: yes  Discuss the patient with other providers: yes  Independent visualization of images, tracings, or specimens: yes        Final diagnoses:   Pneumonia of right lower lobe due to infectious organism   COPD exacerbation            Bigg Campos MD  09/08/17 1852       Electronically signed by Bigg Campos MD at 9/8/2017  6:52 PM      Sophie Araiza at 9/8/2017  6:31 PM          Called [facility] for tele bed. Will call back w/ bed assignment.      Sophie Araiza  09/08/17 1832       Electronically signed by Sophie Araiza at 9/8/2017  6:32 PM        Vital Signs (last 24 hours)       09/08 0700  -  09/09 0659 09/09 0700  -  09/09 0809   Most Recent    Temp (°F) 97.8 -  98.3       97.8 (36.6)    Heart Rate 82 -  101       87    Resp 18 -  25       20    /58 -  167/82       140/83    SpO2 (%) 91 -  100       96          Hospital Medications (all)       Dose Frequency Start End    acetaminophen (TYLENOL) tablet 1,000 mg 1,000 mg Once 9/8/2017 9/8/2017    Sig - Route: Take 2 tablets by mouth 1 (One) Time. - Oral    acetaminophen (TYLENOL) tablet 650 mg 650 mg Every 4 Hours PRN 9/8/2017     Sig - Route: Take 2 tablets by mouth Every 4 (Four) Hours As Needed for Mild Pain . - Oral    ALPRAZolam (XANAX) tablet 0.5 mg 0.5 mg Nightly PRN 9/8/2017     Sig - Route: Take 1 tablet by mouth At Night As Needed for Anxiety. - Oral    amLODIPine (NORVASC) tablet 5 mg 5 mg Every 24 Hours Scheduled 9/9/2017     Sig - Route: Take 1 tablet by mouth Daily. - Oral     atorvastatin (LIPITOR) tablet 40 mg 40 mg Daily 9/9/2017     Sig - Route: Take 1 tablet by mouth Daily. - Oral    azithromycin (ZITHROMAX) tablet 500 mg 500 mg Once 9/8/2017 9/8/2017    Sig - Route: Take 2 tablets by mouth 1 (One) Time. - Oral    AZITHROMYCIN 500 MG/250 ML 0.9% NS IVPB (vial-mate) 500 mg Every 24 Hours 9/9/2017     Sig - Route: Infuse 500 mg into a venous catheter Daily. - Intravenous    carvedilol (COREG) tablet 3.125 mg 3.125 mg 2 Times Daily With Meals 9/8/2017     Sig - Route: Take 1 tablet by mouth 2 (Two) Times a Day With Meals. - Oral    cefTRIAXone (ROCEPHIN) 1 g/100 mL 0.9% NS VTB (mbp) 1 g Once 9/8/2017 9/8/2017    Sig - Route: Infuse 100 mL into a venous catheter 1 (One) Time. - Intravenous    cefTRIAXone (ROCEPHIN) 1 g/100 mL 0.9% NS VTB (mbp) 1 g Every 24 Hours 9/9/2017     Sig - Route: Infuse 100 mL into a venous catheter Daily. - Intravenous    clopidogrel (PLAVIX) tablet 75 mg 75 mg Daily 9/9/2017     Sig - Route: Take 1 tablet by mouth Daily. - Oral    dextrose (D50W) solution 25 g 25 g Every 15 Minutes PRN 9/8/2017     Sig - Route: Infuse 50 mL into a venous catheter Every 15 (Fifteen) Minutes As Needed for Low Blood Sugar (Blood Sugar Less Than 70, Patient Has IV Access - Unresponsive, NPO or Unable To Safely Swallow). - Intravenous    dextrose (GLUTOSE) oral gel 1 tube 1 tube Every 15 Minutes PRN 9/8/2017     Sig - Route: Take 1 tube by mouth Every 15 (Fifteen) Minutes As Needed for Low Blood Sugar (Blood Sugar Less Than 70, Patient Alert, Is Not NPO & Can Safely Swallow). - Oral    enoxaparin (LOVENOX) syringe 40 mg 40 mg Daily 9/9/2017     Sig - Route: Inject 0.4 mL under the skin Daily. - Subcutaneous    escitalopram (LEXAPRO) tablet 10 mg 10 mg Nightly 9/8/2017     Sig - Route: Take 1 tablet by mouth Every Night. - Oral    finasteride (PROSCAR) tablet 5 mg 5 mg Daily 9/9/2017     Sig - Route: Take 1 tablet by mouth Daily. - Oral    glucagon (human recombinant) (GLUCAGEN  DIAGNOSTIC) injection 1 mg 1 mg Every 15 Minutes PRN 9/8/2017     Sig - Route: Inject 1 mg under the skin Every 15 (Fifteen) Minutes As Needed (Blood Glucose Less Than 70 - Patient Without IV Access - Unresponsive, NPO or Unable To Safely Swallow). - Subcutaneous    guaiFENesin (MUCINEX) 12 hr tablet 1,200 mg 1,200 mg 2 Times Daily 9/9/2017     Sig - Route: Take 2 tablets by mouth 2 (Two) Times a Day. - Oral    HYDROcod Polst-CPM Polst ER (TUSSIONEX PENNKINETIC) 10-8 MG/5ML ER suspension 5 mL 5 mL Every 12 Hours PRN 9/8/2017 9/18/2017    Sig - Route: Take 5 mL by mouth Every 12 (Twelve) Hours As Needed for Cough. - Oral    insulin aspart (novoLOG) injection 0-7 Units 0-7 Units 4 Times Daily Before Meals & Nightly 9/8/2017     Sig - Route: Inject 0-7 Units under the skin 4 (Four) Times a Day Before Meals & at Bedtime. - Subcutaneous    ipratropium-albuterol (DUO-NEB) nebulizer solution 3 mL 3 mL Once 9/8/2017 9/8/2017    Sig - Route: Take 3 mL by nebulization 1 (One) Time. - Nebulization    ipratropium-albuterol (DUO-NEB) nebulizer solution 3 mL 3 mL Every 6 Hours - RT 9/9/2017     Sig - Route: Take 3 mL by nebulization Every 6 (Six) Hours. - Nebulization    losartan (COZAAR) tablet 100 mg 100 mg Daily 9/9/2017     Sig - Route: Take 2 tablets by mouth Daily. - Oral    methylPREDNISolone sodium succinate (SOLU-Medrol) injection 125 mg 125 mg Once 9/8/2017 9/8/2017    Sig - Route: Infuse 2 mL into a venous catheter 1 (One) Time. - Intravenous    methylPREDNISolone sodium succinate (SOLU-Medrol) injection 40 mg 40 mg Every 8 Hours 9/9/2017     Sig - Route: Infuse 1 mL into a venous catheter Every 8 (Eight) Hours. - Intravenous    ondansetron (ZOFRAN) injection 4 mg 4 mg Every 6 Hours PRN 9/8/2017     Sig - Route: Infuse 2 mL into a venous catheter Every 6 (Six) Hours As Needed for Nausea or Vomiting. - Intravenous    sodium chloride 0.45 % infusion 75 mL/hr Continuous 9/8/2017     Sig - Route: Infuse 75 mL/hr into a  venous catheter Continuous. - Intravenous    sodium chloride 0.9 % flush 1-10 mL 1-10 mL As Needed 9/8/2017     Sig - Route: Infuse 1-10 mL into a venous catheter As Needed for Line Care. - Intravenous    sodium chloride 0.9 % infusion 75 mL/hr Continuous 9/8/2017     Sig - Route: Infuse 75 mL/hr into a venous catheter Continuous. - Intravenous    tamsulosin (FLOMAX) 24 hr capsule 0.4 mg 0.4 mg Nightly 9/8/2017     Sig - Route: Take 1 capsule by mouth Every Night. - Oral    traMADol (ULTRAM) tablet 50 mg 50 mg Every 6 Hours PRN 9/8/2017 9/18/2017    Sig - Route: Take 1 tablet by mouth Every 6 (Six) Hours As Needed for Moderate Pain . - Oral          Physician Progress Notes (last 24 hours) (Notes from 9/8/2017  8:09 AM through 9/9/2017  8:09 AM)     No notes of this type exist for this encounter.        Consult Notes (last 24 hours) (Notes from 9/8/2017  8:09 AM through 9/9/2017  8:09 AM)     No notes of this type exist for this encounter.           Respiratory Therapy Notes (last 24 hours) (Notes from 9/8/2017  8:09 AM through 9/9/2017  8:09 AM)      Leroy Kent CRT at 9/9/2017  2:12 AM  Version 1 of 1         Problem: NPPV/CPAP (Adult)  Intervention: Monitor/Manage Anxiety Related to NPPV/CPAP    09/09/17 0212   Coping Strategies   Trust Relationship/Rapport care explained;choices provided       Intervention: Prevent Aspiration During Therapy    09/09/17 0212   Positioning   Head Of Bed (HOB) Position HOB elevated         Goal: Signs and Symptoms of Listed Potential Problems Will be Absent or Manageable (NPPV/CPAP)  Outcome: Ongoing (interventions implemented as appropriate)    09/09/17 0212   NPPV/CPAP   Problems Assessed (NPPV/CPAP) hypoxia/hypoxemia;dry mucous membranes;situational response;skin breakdown   Problems Present (NPPV/CPAP) none              Electronically signed by Leroy Kent CRT at 9/9/2017  2:12 AM

## 2017-09-09 NOTE — PLAN OF CARE
Problem: Patient Care Overview (Adult)  Goal: Plan of Care Review  Outcome: Ongoing (interventions implemented as appropriate)    09/09/17 7515   Coping/Psychosocial Response Interventions   Plan Of Care Reviewed With patient   Patient Care Overview   Progress (pt seen for initial evaluation today)   Outcome Evaluation   Outcome Summary/Follow up Plan Pt was seen for initial evaluation. He was able to perform all mobility and transfers with independence using RW. He does not require skilled PT at this time. Encouraged him to continue sitting up in the chair and walking with assist from nursing as needed for lines, O2.

## 2017-09-09 NOTE — PROGRESS NOTES
[facility]    Name:  Thurman Mendel Parsons   Age:  67 y.o.  Sex:  male  :  1950  MRN:  [id number]   Visit Number:  [id number]  Admission Date:  2017  Date Of Service:  17  Primary Care Physician:  Miguel Rojas MD     LOS: 1 day :  Patient Care Team:  Miguel Rojas MD as PCP - General  Miguel Rojas MD as PCP - Family Medicine:    Chief Complaint:      SOA    Subjective / Interval History:     Patient currently on BIPAP, + SOA with heavy expiratory wheezing.  His mentation has improved.  He is now A+O X 3 and answering questions appropriately.  Coughing up yellowish thick sputum.  No current cp, n/v or abdominal pain.  Will continue the DuoNeb, IV Rocephin and Zithromax, IV steroids, and BiPAP throughout the day PRN and at night.  Patient with lower back pain.  Xray of the back has been ordered and pending. Pain med PRN.      I have reviewed labs/imaging/records from this hospitalization, including ER staff and admitting/attending physicians H/P's and progress notes to establish a comprehensive understanding of this patient's clinical hospital course, as well as to establish a transition of care appropriately.    Vital Signs:    Temp:  [97.8 °F (36.6 °C)-98.3 °F (36.8 °C)] 97.8 °F (36.6 °C)  Heart Rate:  [] 87  Resp:  [18-25] 20  BP: (108-167)/() 140/83    Intake and output:    Intake/Output       17 0700 - 17 0659    Intake (ml) 100    Output (ml) --    Net (ml) 100    Last Weight 148 lb 7 oz (67.3 kg)          Physical Examination:    General Appearance:  Currently alert and cooperative   Head:  Atraumatic and normocephalic, without obvious abnormality.   Eyes:      PERRLA, Extra-occular movements are within normal limits.   Lungs:   Diffuse heavy expiratory wheezing in all lung fields   Heart:  Normal S1 and S2, no murmur, no gallop, no rub.   Abdomen:   Normal bowel sounds,  Soft non-tender, non-distended, no guarding,  no rebound tenderness   Extremities: No edema                     Laboratory results:      Results from last 7 days  Lab Units 09/08/17  1300   SODIUM mmol/L 145   POTASSIUM mmol/L 4.4   CHLORIDE mmol/L 96*   CO2 mmol/L 42.0*   BUN mg/dL 26*   CREATININE mg/dL 1.10   CALCIUM mg/dL 10.5*   BILIRUBIN mg/dL 1.3   ALK PHOS U/L 90   ALT (SGPT) U/L 37   AST (SGOT) U/L 31   GLUCOSE mg/dL 127*       Results from last 7 days  Lab Units 09/09/17  0605 09/08/17  1300   WBC 10*3/mm3 12.96* 15.84*   HEMOGLOBIN g/dL 11.2* 12.2*   HEMATOCRIT % 35.7* 39.8*   PLATELETS 10*3/mm3 161 175       Results from last 7 days  Lab Units 09/08/17  1300   TROPONIN I ng/mL 0.077*           I have reviewed the patient's laboratory results.    Radiology results:    Imaging Results (last 24 hours)     Procedure Component Value Units Date/Time    XR Chest 1 View [[id number]] Collected:  09/08/17 1320     Updated:  09/08/17 1323    Narrative:       PROCEDURE: XR CHEST 1 VW-     HISTORY: sob     COMPARISON: August 1, 2017.     FINDINGS: The heart is normal in size. The mediastinum is unremarkable.  There are low lung volumes with a right basilar opacity which may  reflect atelectasis or pneumonia. There is no pneumothorax.  There are  no acute osseous abnormalities.           Impression:       Low lung volumes with a right basilar opacity which may  reflect atelectasis or pneumonia.     Continued followup is recommended.     This report was finalized on 9/8/2017 1:20 PM by Richelle Patel M.D..          I have reviewed the patient's radiology reports.    Medication Review:     I have reviewed the patients active and prn medications.       Assessment/Plan:  1.  Right lower lobe opacity just with pneumonia, present on admission.  2.  Acute on chronic respiratory failure with hypercapnia   3.  COPD with exacerbation  4.  PEDRO noncompliant with CPAP  5.  Diabetes type 2, borderline  6.  History of coronary artery disease/MI  7.  Chronic kidney disease  stage III  8.  Hypertension  9.  BPH  10.  Low back pain       -Continue patient on DuoNeb, IV steroids, IV Rocephin and Zithromax for acute COPD with RLL pneumonia.  BiPAP throughout the day PRN and at night.  Lower back pain, xray of spine ordered. Monitor FS closely while on steroids.     Edward Davis MD  09/09/17  9:30 AM

## 2017-09-09 NOTE — CONSULTS
[facility]-Cardiology Consult Note    Referring Provider: Vanessa  Reason for Consultation: Abnormal Troponin    Patient Care Team:  Miguel Rojas MD as PCP - General  Miguel Rojsa MD as PCP - Family Medicine    Chief complaint : SOB    Subjective .     History of present illness:  This is a [age]-year-old male patient with no history of documented coronary disease who presents to the emergency room with progressively worsening shortness of breath.  The patient has a history of advanced COPD\emphysema and continues to smoke.  The patient was initially mildly hypoxemic but had severe CO2 retention with arterial blood gas PCO2 levels greater than 90 mmHg.  The patient was treated with BiPAP with some improvement.  The patient has previously had cardiac catheterization which showed no obstructive coronary disease.  In 2011 the patient had a dobutamine stress echo which was negative for ischemia and with no evidence of infarction.  The patient's last echocardiogram in 2015 showed inferior and posterior regional wall motion abnormalities with an ejection fraction of 45-50%.  The patient denies having chest pain.  He has no orthopnea PND or lower extremity edema.  There is no dizziness palpitations or syncope.  The patient has a history of paroxysmal atrial fibrillation but is in normal sinus rhythm at this point.  His initial 12-lead electrocardiogram showed normal sinus rhythm left axis deviation and right bundle branch block and a left anterior fascicular block with decreased voltage across the entire right precordium.  The patient had a single troponin drawn which was mildly elevated at 0.077.  The patient currently reports feeling much better.  He indicates that he desires to discontinue cigarette smoking.    Review of Systems   Review of Systems   Constitution: Negative for chills, diaphoresis, fever, weakness, malaise/fatigue, night sweats, weight gain and weight loss.   HENT: Negative for ear discharge, hearing loss,  hoarse voice and nosebleeds.    Eyes: Negative for discharge, double vision, pain and photophobia.   Cardiovascular: Positive for dyspnea on exertion. Negative for chest pain, claudication, cyanosis, irregular heartbeat, leg swelling, near-syncope, orthopnea, palpitations, paroxysmal nocturnal dyspnea and syncope.   Respiratory: Positive for shortness of breath. Negative for cough, hemoptysis, sputum production and wheezing.    Endocrine: Negative for cold intolerance, heat intolerance, polydipsia, polyphagia and polyuria.   Hematologic/Lymphatic: Negative for adenopathy and bleeding problem. Does not bruise/bleed easily.   Skin: Negative for color change, flushing, itching and rash.   Musculoskeletal: Negative for muscle cramps, muscle weakness, myalgias and stiffness.   Gastrointestinal: Negative for abdominal pain, diarrhea, hematemesis, hematochezia, nausea and vomiting.   Genitourinary: Negative for dysuria, frequency and nocturia.   Neurological: Negative for focal weakness, loss of balance, numbness, paresthesias and seizures.   Psychiatric/Behavioral: Negative for altered mental status, hallucinations and suicidal ideas.   Allergic/Immunologic: Negative for HIV exposure, hives and persistent infections.       History  Past Medical History:   Diagnosis Date   • Abnormal liver enzymes    • Acute bronchitis    • Anemia    • Anxiety    • Arthritis    • Atherosclerotic heart disease of native coronary artery without angina pectoris    • Atrial fibrillation    • Atypical chest pain    • Back pain    • BPH (benign prostatic hyperplasia)    • Cardiac pain    • Chronic bronchitis    • Chronic kidney disease    • COPD (chronic obstructive pulmonary disease)    • Degeneration of cervical intervertebral disc    • Depression    • Diabetes mellitus    • Diverticulosis    • Dyspnea    • Esophageal reflux    • Esophagitis    • Gastritis    • Hematuria    • Hemorrhoids    • Hiatal hernia    • History of arthritis    •  History of myocardial infarction    • History of peripheral neuropathy    • History of ventricular septal defect    • History of ventricular septal defect    • Hyperlipidemia    • Hypertension    • Infectious diarrhea    • Infectious diarrhea    • Kyphosis, acquired    • Lumbar radiculopathy    • Mitral and aortic valve disease    • Myocardial infarction    • Nephrolithiasis    • Phimosis    • Respiratory failure    • Sleep apnea    • Stroke syndrome    • Stroke syndrome    • Urinary calculus    • Urinary tract infection    • Ventral hernia    , Past Surgical History:   Procedure Laterality Date   • HERNIA REPAIR     • SUPRAPUBIC CATHETER INSERTION      History of Percutaneous Catheter Placement Into Ureter   • THROAT SURGERY     • VSD REPAIR      History of VSD Repair By Patch Closure   , Family History   Problem Relation Age of Onset   • Other Father      CABG   • Coronary artery disease Father    , Social History   Substance Use Topics   • Smoking status: Former Smoker   • Smokeless tobacco: Never Used   • Alcohol use No   , Prescriptions Prior to Admission   Medication Sig Dispense Refill Last Dose   • nitroglycerin (NITROSTAT) 0.4 MG SL tablet Place  under the tongue.   Taking   • albuterol (PROVENTIL) (2.5 MG/3ML) 0.083% nebulizer solution Albuterol Sulfate (2.5 MG/3ML) 0.083% Inhalation Nebulization Solution; Patient Sig: Albuterol Sulfate (2.5 MG/3ML) 0.083% Inhalation Nebulization Solution USE 1 UNIT DOSE EVERY 4-6 HOURS AS NEEDED FOR WHEEZING .; 3; 5; 04-Sep-2012; Active   9/8/2017   • alfuzosin (UROXATRAL) 10 MG 24 hr tablet Take 1 tablet by mouth Daily. 30 tablet 5 9/8/2017   • ALPRAZolam (XANAX) 0.5 MG tablet Take 1 tablet by mouth At Night As Needed for Anxiety. 30 tablet 2 9/7/2017   • amLODIPine (NORVASC) 5 MG tablet TAKE ONE TABLET BY MOUTH DAILY 30 tablet 5 9/8/2017   • atorvastatin (LIPITOR) 40 MG tablet Take 1 tablet by mouth Daily. 30 tablet 3 9/7/2017   • BREO ELLIPTA 200-25 MCG/INH aerosol  "powder    10 9/8/2017   • carvedilol (COREG) 3.125 MG tablet TAKE ONE TABLET BY MOUTH TWICE A DAY WITH MEALS 60 tablet 2 9/8/2017 at 0800   • clopidogrel (PLAVIX) 75 MG tablet Take 1 tablet by mouth Daily. 90 tablet 1 9/8/2017   • cyclobenzaprine (FLEXERIL) 10 MG tablet TAKE ONE TABLET BY MOUTH EVERY NIGHT AT BEDTIME AS NEEDED 30 tablet 0 9/7/2017   • doxycycline (VIBRAMYCIN) 100 MG capsule Take 1 capsule by mouth 2 (Two) Times a Day. 28 capsule 0    • escitalopram (LEXAPRO) 10 MG tablet Take 1 tablet by mouth Daily. (Patient taking differently: Take 10 mg by mouth Every Evening.) 90 tablet 1 9/8/2017   • escitalopram (LEXAPRO) 10 MG tablet TAKE ONE TABLET BY MOUTH DAILY 30 tablet 3    • finasteride (PROSCAR) 5 MG tablet    9/8/2017   • fluticasone (FLONASE) 50 MCG/ACT nasal spray 1 spray Daily.   9/8/2017   • losartan (COZAAR) 100 MG tablet Take 1 tablet by mouth daily. 90 tablet 3 Taking   • oxybutynin (DITROPAN) 5 MG tablet TAKE ONE TABLET BY MOUTH AT BEDTIME 30 tablet 5 9/7/2017   • predniSONE (DELTASONE) 10 MG tablet Take 10 mg by mouth Daily.   9/8/2017   • PROAIR  (90 BASE) MCG/ACT inhaler Daily.   9/8/2017    and Allergies:  Sulfa antibiotics    Objective     Vital Sign Min/Max for last 24 hours  Temp  Min: 97.8 °F (36.6 °C)  Max: 98.3 °F (36.8 °C)   BP  Min: 108/58  Max: 167/80   Pulse  Min: 82  Max: 101   Resp  Min: 18  Max: 25   SpO2  Min: 91 %  Max: 100 %   Flow (L/min)  Min: 15  Max: 15   Weight  Min: 137 lb (62.1 kg)  Max: 148 lb 7 oz (67.3 kg)     Flowsheet Rows         First Filed Value    Admission Height  63\" (160 cm) Documented at 09/08/2017 1250    Admission Weight  137 lb (62.1 kg) Documented at 09/08/2017 1250             Ejection Fraction  LVEF by 2015 Echo: 45-50%      Physical Exam:   Physical Exam   Constitutional: He is oriented to person, place, and time. He appears well-developed and well-nourished.   HENT:   Head: Normocephalic and atraumatic.   Mouth/Throat: Oropharynx is clear " and moist.   Eyes: Conjunctivae and EOM are normal. Pupils are equal, round, and reactive to light. No scleral icterus.   Neck: Normal range of motion. Neck supple. No JVD present. No tracheal deviation present. No thyromegaly present.   Cardiovascular: Normal rate, regular rhythm, S1 normal, S2 normal, normal heart sounds, intact distal pulses and normal pulses.  PMI is not displaced.  Exam reveals no gallop and no friction rub.    No murmur heard.  Pulmonary/Chest: Effort normal. No respiratory distress. He has wheezes. He has no rales.   Abdominal: Soft. Bowel sounds are normal. He exhibits no distension and no mass. There is no tenderness. There is no rebound and no guarding.   Musculoskeletal: Normal range of motion. He exhibits no edema or deformity.   Neurological: He is alert and oriented to person, place, and time. He displays normal reflexes. No cranial nerve deficit. Coordination normal.   Skin: Skin is warm and dry. No rash noted. No erythema.   Psychiatric: He has a normal mood and affect. His behavior is normal. Thought content normal.       Results Review:   I reviewed the patient's new clinical results.    Results from last 7 days  Lab Units 09/09/17  0605 09/08/17  1300   WBC 10*3/mm3 12.96* 15.84*   HEMOGLOBIN g/dL 11.2* 12.2*   HEMATOCRIT % 35.7* 39.8*   PLATELETS 10*3/mm3 161 175       Results from last 7 days  Lab Units 09/08/17  1300   SODIUM mmol/L 145   POTASSIUM mmol/L 4.4   CHLORIDE mmol/L 96*   CO2 mmol/L 42.0*   BUN mg/dL 26*   CREATININE mg/dL 1.10   GLUCOSE mg/dL 127*   CALCIUM mg/dL 10.5*      TROPONINT: 0.077          Assessment/Plan     Active Problems:    Pneumonia of right lower lobe due to infectious organism    Abnormal troponin secondary to non-thrombotic biomarker event\type II myocardial injury-this is secondary to COPD exacerbation.  There is no evidence of acute coronary syndrome.  He does not need to be treated as an acute coronary syndrome or non-ST elevation myocardial  infarction.  Full dose anticoagulation above and beyond that necessary for DVT prophylaxis isn't necessary.  I have recommended a repeat troponin in the morning.  He is scheduled to have a follow-up echocardiogram Monday morning.    Coronary artery disease-native vessels without angina pectoris: The patient's last cardiac catheterization showed no significant coronary disease.  The patient has multiple risk factors for atherosclerosis progression.  We are making arrangements for repeat troponin in the morning.    Essential hypertension.  Well controlled    Chronic left ventricular systolic heart failure.  His last ejection fraction 2 years ago was 45%.    Paroxysmal atrial fibrillation.  The patient is maintaining normal sinus rhythm.    The patient has been counseled that it is essential that he discontinue cigarette smoking.    I discussed the patients findings and my recommendations with patient    Jose Allen MD  09/09/17  12:03 PM

## 2017-09-09 NOTE — PLAN OF CARE
Problem: Patient Care Overview (Adult)  Goal: Plan of Care Review  Outcome: Ongoing (interventions implemented as appropriate)    09/09/17 1536   Coping/Psychosocial Response Interventions   Plan Of Care Reviewed With patient   Patient Care Overview   Progress no change         Problem: NPPV/CPAP (Adult)  Goal: Signs and Symptoms of Listed Potential Problems Will be Absent or Manageable (NPPV/CPAP)  Outcome: Ongoing (interventions implemented as appropriate)    09/09/17 1536   NPPV/CPAP   Problems Assessed (NPPV/CPAP) gastric distention leading to aspiration;hypoxia/hypoxemia   Problems Present (NPPV/CPAP) gastric distention leading to aspiration;hypoxia/hypoxemia

## 2017-09-09 NOTE — PLAN OF CARE
Problem: NPPV/CPAP (Adult)  Goal: Signs and Symptoms of Listed Potential Problems Will be Absent or Manageable (NPPV/CPAP)  Outcome: Ongoing (interventions implemented as appropriate)    09/09/17 0212   NPPV/CPAP   Problems Assessed (NPPV/CPAP) hypoxia/hypoxemia;dry mucous membranes;situational response;skin breakdown   Problems Present (NPPV/CPAP) none         Problem: Respiratory Insufficiency (Adult)  Goal: Identify Related Risk Factors and Signs and Symptoms  Outcome: Ongoing (interventions implemented as appropriate)    09/09/17 0213   Respiratory Insufficiency   Related Risk Factors (Respiratory Insufficiency) activity intolerance;knowledge deficit;sleep disturbance;smoking   Signs and Symptoms (Respiratory Insufficiency) abnormal ABGs;abnormal breath sounds;sleeplessness/fatigue;sleep apnea/periodic apnea       Goal: Acid/Base Balance  Outcome: Ongoing (interventions implemented as appropriate)    09/09/17 0213   Respiratory Insufficiency (Adult)   Acid/Base Balance unable to achieve outcome       Goal: Effective Ventilation  Outcome: Ongoing (interventions implemented as appropriate)    09/09/17 0213   Respiratory Insufficiency (Adult)   Effective Ventilation unable to achieve outcome         Problem: Pain, Acute (Adult)  Goal: Identify Related Risk Factors and Signs and Symptoms  Outcome: Ongoing (interventions implemented as appropriate)    09/09/17 0213   Pain, Acute   Related Risk Factors (Acute Pain) disease process   Signs and Symptoms (Acute Pain) sleep pattern alteration       Goal: Acceptable Pain Control/Comfort Level  Outcome: Ongoing (interventions implemented as appropriate)    09/09/17 0213   Pain, Acute (Adult)   Acceptable Pain Control/Comfort Level unable to achieve outcome

## 2017-09-10 ENCOUNTER — APPOINTMENT (OUTPATIENT)
Dept: CARDIOLOGY | Facility: HOSPITAL | Age: 67
End: 2017-09-10
Attending: INTERNAL MEDICINE

## 2017-09-10 LAB
ANION GAP SERPL CALCULATED.3IONS-SCNC: 8.3 MMOL/L
ARTERIAL PATENCY WRIST A: POSITIVE
ARTERIAL PATENCY WRIST A: POSITIVE
ATMOSPHERIC PRESS: 742 MMHG
ATMOSPHERIC PRESS: 742 MMHG
BASE EXCESS BLDA CALC-SCNC: 14.4 MMOL/L
BASE EXCESS BLDA CALC-SCNC: 14.4 MMOL/L
BASOPHILS # BLD AUTO: 0.02 10*3/MM3 (ref 0–0.2)
BASOPHILS NFR BLD AUTO: 0.1 % (ref 0–2.5)
BDY SITE: ABNORMAL
BDY SITE: ABNORMAL
BUN BLD-MCNC: 31 MG/DL (ref 7–20)
BUN/CREAT SERPL: 31 (ref 6.3–21.9)
CALCIUM SPEC-SCNC: 8.6 MG/DL (ref 8.4–10.2)
CHLORIDE SERPL-SCNC: 98 MMOL/L (ref 98–107)
CO2 SERPL-SCNC: 38 MMOL/L (ref 26–30)
CREAT BLD-MCNC: 1 MG/DL (ref 0.6–1.3)
DEPRECATED RDW RBC AUTO: 42.1 FL (ref 37–54)
EOSINOPHIL # BLD AUTO: 0 10*3/MM3 (ref 0–0.7)
EOSINOPHIL NFR BLD AUTO: 0 % (ref 0–7)
ERYTHROCYTE [DISTWIDTH] IN BLOOD BY AUTOMATED COUNT: 12.2 % (ref 11.5–14.5)
GFR SERPL CREATININE-BSD FRML MDRD: 75 ML/MIN/1.73
GLUCOSE BLD-MCNC: 152 MG/DL (ref 74–98)
GLUCOSE BLDC GLUCOMTR-MCNC: 149 MG/DL (ref 70–130)
GLUCOSE BLDC GLUCOMTR-MCNC: 156 MG/DL (ref 70–130)
GLUCOSE BLDC GLUCOMTR-MCNC: 163 MG/DL (ref 70–130)
GLUCOSE BLDC GLUCOMTR-MCNC: 165 MG/DL (ref 70–130)
HCO3 BLDA-SCNC: 39.6 MMOL/L (ref 22–28)
HCO3 BLDA-SCNC: 39.6 MMOL/L (ref 22–28)
HCT VFR BLD AUTO: 33.3 % (ref 42–52)
HGB BLD-MCNC: 10.2 G/DL (ref 14–18)
HGB BLDA-MCNC: 10.9 G/DL (ref 12–18)
HGB BLDA-MCNC: 10.9 G/DL (ref 12–18)
HOROWITZ INDEX BLD+IHG-RTO: 50 %
HOROWITZ INDEX BLD+IHG-RTO: 50 %
IMM GRANULOCYTES # BLD: 0.14 10*3/MM3 (ref 0–0.06)
IMM GRANULOCYTES NFR BLD: 0.9 % (ref 0–0.6)
LYMPHOCYTES # BLD AUTO: 0.44 10*3/MM3 (ref 0.6–3.4)
LYMPHOCYTES NFR BLD AUTO: 2.9 % (ref 10–50)
MCH RBC QN AUTO: 29 PG (ref 27–31)
MCHC RBC AUTO-ENTMCNC: 30.6 G/DL (ref 30–37)
MCV RBC AUTO: 94.6 FL (ref 80–94)
MODALITY: ABNORMAL
MODALITY: ABNORMAL
MONOCYTES # BLD AUTO: 0.62 10*3/MM3 (ref 0–0.9)
MONOCYTES NFR BLD AUTO: 4.1 % (ref 0–12)
NEUTROPHILS # BLD AUTO: 13.82 10*3/MM3 (ref 2–6.9)
NEUTROPHILS NFR BLD AUTO: 92 % (ref 37–80)
NRBC BLD MANUAL-RTO: 0 /100 WBC (ref 0–0)
PCO2 BLDA: 67.1 MM HG (ref 35–45)
PCO2 BLDA: 67.1 MM HG (ref 35–45)
PH BLDA: 7.38 PH UNITS
PH BLDA: 7.38 PH UNITS
PLATELET # BLD AUTO: 179 10*3/MM3 (ref 130–400)
PMV BLD AUTO: 10 FL (ref 6–12)
PO2 BLDA: 110 MM HG (ref 75–100)
PO2 BLDA: 110 MM HG (ref 75–100)
POTASSIUM BLD-SCNC: 4.3 MMOL/L (ref 3.5–5.1)
RBC # BLD AUTO: 3.52 10*6/MM3 (ref 4.7–6.1)
SAO2 % BLDCOA: 98.8 %
SAO2 % BLDCOA: 98.8 %
SODIUM BLD-SCNC: 140 MMOL/L (ref 137–145)
TROPONIN I SERPL-MCNC: 0.05 NG/ML (ref 0–0.03)
WBC NRBC COR # BLD: 15.04 10*3/MM3 (ref 4.8–10.8)

## 2017-09-10 PROCEDURE — 94799 UNLISTED PULMONARY SVC/PX: CPT

## 2017-09-10 PROCEDURE — 93306 TTE W/DOPPLER COMPLETE: CPT

## 2017-09-10 PROCEDURE — 82805 BLOOD GASES W/O2 SATURATION: CPT

## 2017-09-10 PROCEDURE — 25010000002 CEFTRIAXONE: Performed by: INTERNAL MEDICINE

## 2017-09-10 PROCEDURE — 25010000002 AZITHROMYCIN: Performed by: INTERNAL MEDICINE

## 2017-09-10 PROCEDURE — 80048 BASIC METABOLIC PNL TOTAL CA: CPT | Performed by: INTERNAL MEDICINE

## 2017-09-10 PROCEDURE — 36600 WITHDRAWAL OF ARTERIAL BLOOD: CPT

## 2017-09-10 PROCEDURE — 25010000002 METHYLPREDNISOLONE PER 40 MG: Performed by: INTERNAL MEDICINE

## 2017-09-10 PROCEDURE — 84484 ASSAY OF TROPONIN QUANT: CPT | Performed by: INTERNAL MEDICINE

## 2017-09-10 PROCEDURE — 94660 CPAP INITIATION&MGMT: CPT

## 2017-09-10 PROCEDURE — 82962 GLUCOSE BLOOD TEST: CPT

## 2017-09-10 PROCEDURE — 25010000002 ENOXAPARIN PER 10 MG: Performed by: INTERNAL MEDICINE

## 2017-09-10 PROCEDURE — 85025 COMPLETE CBC W/AUTO DIFF WBC: CPT | Performed by: INTERNAL MEDICINE

## 2017-09-10 PROCEDURE — 93306 TTE W/DOPPLER COMPLETE: CPT | Performed by: INTERNAL MEDICINE

## 2017-09-10 PROCEDURE — 99232 SBSQ HOSP IP/OBS MODERATE 35: CPT | Performed by: INTERNAL MEDICINE

## 2017-09-10 RX ORDER — BENZONATATE 100 MG/1
200 CAPSULE ORAL 3 TIMES DAILY PRN
Status: DISCONTINUED | OUTPATIENT
Start: 2017-09-10 | End: 2017-09-18 | Stop reason: HOSPADM

## 2017-09-10 RX ADMIN — IPRATROPIUM BROMIDE AND ALBUTEROL SULFATE 3 ML: .5; 3 SOLUTION RESPIRATORY (INHALATION) at 06:58

## 2017-09-10 RX ADMIN — ESCITALOPRAM OXALATE 10 MG: 10 TABLET ORAL at 20:38

## 2017-09-10 RX ADMIN — Medication 10 ML: at 20:39

## 2017-09-10 RX ADMIN — CEFTRIAXONE 1 G: 1 INJECTION, POWDER, FOR SOLUTION INTRAMUSCULAR; INTRAVENOUS at 15:45

## 2017-09-10 RX ADMIN — LOSARTAN POTASSIUM 100 MG: 50 TABLET, FILM COATED ORAL at 09:27

## 2017-09-10 RX ADMIN — CARVEDILOL 6.25 MG: 6.25 TABLET, FILM COATED ORAL at 18:57

## 2017-09-10 RX ADMIN — AMLODIPINE BESYLATE 5 MG: 5 TABLET ORAL at 09:27

## 2017-09-10 RX ADMIN — METHYLPREDNISOLONE SODIUM SUCCINATE 40 MG: 40 INJECTION, POWDER, FOR SOLUTION INTRAMUSCULAR; INTRAVENOUS at 18:57

## 2017-09-10 RX ADMIN — METHYLPREDNISOLONE SODIUM SUCCINATE 40 MG: 40 INJECTION, POWDER, FOR SOLUTION INTRAMUSCULAR; INTRAVENOUS at 09:27

## 2017-09-10 RX ADMIN — CARVEDILOL 6.25 MG: 6.25 TABLET, FILM COATED ORAL at 09:27

## 2017-09-10 RX ADMIN — SODIUM CHLORIDE 75 ML/HR: 4.5 INJECTION, SOLUTION INTRAVENOUS at 20:44

## 2017-09-10 RX ADMIN — FINASTERIDE 5 MG: 5 TABLET, FILM COATED ORAL at 09:27

## 2017-09-10 RX ADMIN — CLOPIDOGREL BISULFATE 75 MG: 75 TABLET ORAL at 09:27

## 2017-09-10 RX ADMIN — GUAIFENESIN 1200 MG: 600 TABLET, EXTENDED RELEASE ORAL at 09:28

## 2017-09-10 RX ADMIN — SODIUM CHLORIDE 75 ML/HR: 4.5 INJECTION, SOLUTION INTRAVENOUS at 06:00

## 2017-09-10 RX ADMIN — BENZONATATE 200 MG: 100 CAPSULE, LIQUID FILLED ORAL at 09:26

## 2017-09-10 RX ADMIN — AZITHROMYCIN 500 MG: 500 INJECTION, POWDER, LYOPHILIZED, FOR SOLUTION INTRAVENOUS at 17:06

## 2017-09-10 RX ADMIN — TAMSULOSIN HYDROCHLORIDE 0.4 MG: 0.4 CAPSULE ORAL at 20:38

## 2017-09-10 RX ADMIN — SPIRONOLACTONE 12.5 MG: 25 TABLET, FILM COATED ORAL at 09:27

## 2017-09-10 RX ADMIN — HYDROCODONE POLISTIREX AND CHLORPHENIRAMINE POLISTIREX 5 ML: 10; 8 SUSPENSION, EXTENDED RELEASE ORAL at 06:05

## 2017-09-10 RX ADMIN — GUAIFENESIN 1200 MG: 600 TABLET, EXTENDED RELEASE ORAL at 18:57

## 2017-09-10 RX ADMIN — IPRATROPIUM BROMIDE AND ALBUTEROL SULFATE 3 ML: .5; 3 SOLUTION RESPIRATORY (INHALATION) at 19:49

## 2017-09-10 RX ADMIN — ENOXAPARIN SODIUM 40 MG: 40 INJECTION SUBCUTANEOUS at 09:27

## 2017-09-10 RX ADMIN — IPRATROPIUM BROMIDE AND ALBUTEROL SULFATE 3 ML: .5; 3 SOLUTION RESPIRATORY (INHALATION) at 01:02

## 2017-09-10 RX ADMIN — ATORVASTATIN CALCIUM 40 MG: 40 TABLET, FILM COATED ORAL at 09:27

## 2017-09-10 RX ADMIN — METHYLPREDNISOLONE SODIUM SUCCINATE 40 MG: 40 INJECTION, POWDER, FOR SOLUTION INTRAMUSCULAR; INTRAVENOUS at 01:00

## 2017-09-10 RX ADMIN — ASPIRIN 81 MG: 81 TABLET, COATED ORAL at 09:28

## 2017-09-10 RX ADMIN — ALPRAZOLAM 0.5 MG: 0.5 TABLET ORAL at 06:05

## 2017-09-10 NOTE — PROGRESS NOTES
[facility]    Name:  Thurman Mendel Parsons   Age:  67 y.o.  Sex:  male  :  1950  MRN:  [id number]   Visit Number:  [id number]  Admission Date:  2017  Date Of Service:  09/10/17  Primary Care Physician:  Miguel Rojas MD     LOS: 2 days :  Patient Care Team:  Miguel Rojas MD as PCP - General  Miguel Rojas MD as PCP - Family Medicine:    Chief Complaint:      Confused    Subjective / Interval History:     Patient SOA and confused this morning.  Will place patient back on his BIPAP and check and ABG.  Patient with productive yellowish thick sputum.      I have reviewed labs/imaging/records from this hospitalization, including ER staff and admitting/attending physicians H/P's and progress notes to establish a comprehensive understanding of this patient's clinical hospital course, as well as to establish a transition of care appropriately.    Vital Signs:    Temp:  [97.8 °F (36.6 °C)-98.1 °F (36.7 °C)] 97.8 °F (36.6 °C)  Heart Rate:  [75-98] 75  Resp:  [18-20] 19  BP: (111-134)/(42-86) 118/42    Intake and output:    Intake/Output       17 0700 - 09/10/17 0659 09/10/17 0700 - 17 0659    Intake (ml) 2962 240    Output (ml) 2000 400    Net (ml) 962 -160    Last Weight 149 lb 1.6 oz (67.6 kg) --          Physical Examination:    General Appearance:  Alert and cooperative, not in any acute distress.   Head:  Atraumatic and normocephalic, without obvious abnormality.   Eyes:      PERRLA, Extra-occular movements are within normal limits.   Lungs:   Chest shape is normal. Breath sounds heard bilaterally equally.  No crackles or wheezing.   Heart:  Normal S1 and S2, no murmur, no gallop, no rub.   Abdomen:   Normal bowel sounds,  Soft non-tender, non-distended, no guarding, no rebound tenderness   Extremities: No edema                     Laboratory results:      Results from last 7 days  Lab Units 09/10/17  0645 17  1300   SODIUM mmol/L 140 145    POTASSIUM mmol/L 4.3 4.4   CHLORIDE mmol/L 98 96*   CO2 mmol/L 38.0* 42.0*   BUN mg/dL 31* 26*   CREATININE mg/dL 1.00 1.10   CALCIUM mg/dL 8.6 10.5*   BILIRUBIN mg/dL  --  1.3   ALK PHOS U/L  --  90   ALT (SGPT) U/L  --  37   AST (SGOT) U/L  --  31   GLUCOSE mg/dL 152* 127*       Results from last 7 days  Lab Units 09/10/17  0653 09/09/17  0605 09/08/17  1300   WBC 10*3/mm3 15.04* 12.96* 15.84*   HEMOGLOBIN g/dL 10.2* 11.2* 12.2*   HEMATOCRIT % 33.3* 35.7* 39.8*   PLATELETS 10*3/mm3 179 161 175       Results from last 7 days  Lab Units 09/10/17  0645 09/08/17  1300   TROPONIN I ng/mL 0.054* 0.077*           I have reviewed the patient's laboratory results.    Radiology results:    Imaging Results (last 24 hours)     Procedure Component Value Units Date/Time    XR Spine Lumbar 2 or 3 View [[id number]] Collected:  09/09/17 1138     Updated:  09/09/17 1140    Narrative:       FINAL REPORT    TECHNIQUE:  3 views    CLINICAL HISTORY:  LOWER BACK PAIN    FINDINGS:  3 views of the lumbar spine were obtained.  There is no acute fracture. There is disc space narrowing at L3-L4 and L4-L5.  Anterior osteophytes and degenerative facet changes are seen throughout.  There is mild retrolisthesis of L3 on L4.  Aortoiliac   vascular calcifications are noted.      Impression:       No acute bony abnormality.    Authenticated by Pasquale Williamson MD on 09/09/2017 11:38:51 AM          I have reviewed the patient's radiology reports.    Medication Review:     I have reviewed the patients active and prn medications.       Assessment/Plan:  1.  Right lower lobe opacity just with pneumonia, present on admission.  2.  Acute on chronic respiratory failure with hypercapnia   3.  COPD with exacerbation  4.  PEDRO noncompliant with CPAP  5.  Diabetes type 2, borderline  6.  History of coronary artery disease/MI  7.  Chronic kidney disease stage III  8.  Hypertension  9.  BPH  10.  Low back pain  11. NSTEMI, likely secondary to supply demand  mismatch         -Patient more soa and confused this morning.  Will place patient on BIPAP and acquire an ABG. Continue patient on DuoNeb, IV steroids, IV Rocephin and Zithromax for acute COPD with RLL pneumonia.  Elevated troponin on admission, likely secondary to supply demand mismatch.  Serial troponins are trending down.  EKG showed NSR with right bundle branch block.  Echo ordered. CP free.  Currently on ASA, Plavix, Coreg, losartan, and spironolactone. Possibly DC in 2-3 days.     Edward Davis MD  09/10/17  11:35 AM

## 2017-09-10 NOTE — PROGRESS NOTES
Discharge Planning Assessment   [place]     Patient Name: Thurman Mendel Parsons  MRN: [id number]  Today's Date: 9/10/2017    Admit Date: 9/8/2017          Discharge Needs Assessment     None            Discharge Plan       09/10/17 1611    Case Management/Social Work Plan    Plan Spoke to patient and brother.  Patient lives alone.  Has Bi-pap provided by [facility] and 4L O2 continous by [place].  Has a rolling walker, cane and commode.  Denies HH.  Plan [place] at [place].  Confirmed demos, contact info and PCP as correct.  no POA.  Case managment will continue to follow.     Patient/Family In Agreement With Plan yes        Discharge Placement     No information found                Demographic Summary     None            Functional Status     None            Psychosocial     None            Abuse/Neglect     None            Legal     None            Substance Abuse     None            Patient Forms     None          April Mullins

## 2017-09-10 NOTE — PLAN OF CARE
Problem: Patient Care Overview (Adult)  Goal: Plan of Care Review  Outcome: Ongoing (interventions implemented as appropriate)    09/09/17 2000   Coping/Psychosocial Response Interventions   Plan Of Care Reviewed With patient   Patient Care Overview   Progress improving   Outcome Evaluation   Outcome Summary/Follow up Plan pleasant patient with mininal complaint of pain-IV fluids infusing per physician's orders-medications given per physician's orders-monitor labs, monitor blood sugar with coverage per protocol-will continue to monitor patient       Goal: Adult Individualization and Mutuality  Outcome: Ongoing (interventions implemented as appropriate)  Goal: Discharge Needs Assessment  Outcome: Ongoing (interventions implemented as appropriate)    Problem: Respiratory Insufficiency (Adult)  Goal: Identify Related Risk Factors and Signs and Symptoms  Outcome: Outcome(s) achieved Date Met:  09/09/17  Goal: Acid/Base Balance  Outcome: Ongoing (interventions implemented as appropriate)  Goal: Effective Ventilation  Outcome: Ongoing (interventions implemented as appropriate)    Problem: Pain, Acute (Adult)  Goal: Identify Related Risk Factors and Signs and Symptoms  Outcome: Ongoing (interventions implemented as appropriate)  Goal: Acceptable Pain Control/Comfort Level  Outcome: Ongoing (interventions implemented as appropriate)    Problem: Older Inpatient, Acutely Ill (Adult)  Goal: Signs and Symptoms of Listed Potential Problems Will be Absent or Manageable (Older Inpatient, Acutely Ill)  Outcome: Ongoing (interventions implemented as appropriate)

## 2017-09-10 NOTE — PLAN OF CARE
Problem: NPPV/CPAP (Adult)  Goal: Signs and Symptoms of Listed Potential Problems Will be Absent or Manageable (NPPV/CPAP)  Outcome: Ongoing (interventions implemented as appropriate)    09/10/17 4574   NPPV/CPAP   Problems Assessed (NPPV/CPAP) all   Problems Present (NPPV/CPAP) none

## 2017-09-10 NOTE — PLAN OF CARE
Problem: Pain, Acute (Adult)  Goal: Identify Related Risk Factors and Signs and Symptoms  Outcome: Outcome(s) achieved Date Met:  09/10/17    Problem: Older Inpatient, Acutely Ill (Adult)  Goal: Signs and Symptoms of Listed Potential Problems Will be Absent or Manageable (Older Inpatient, Acutely Ill)  Outcome: Ongoing (interventions implemented as appropriate)

## 2017-09-10 NOTE — PLAN OF CARE
Problem: Patient Care Overview (Adult)  Goal: Plan of Care Review  Outcome: Ongoing (interventions implemented as appropriate)    09/10/17 5745   Coping/Psychosocial Response Interventions   Plan Of Care Reviewed With patient   Patient Care Overview   Progress no change   Outcome Evaluation   Outcome Summary/Follow up Plan Pt. is still having dyspnea with minimal exertion; still productive on cough. He had some confusion this am which was helped by BIPAP.         Problem: Respiratory Insufficiency (Adult)  Goal: Acid/Base Balance  Outcome: Ongoing (interventions implemented as appropriate)  Goal: Effective Ventilation  Outcome: Ongoing (interventions implemented as appropriate)    Problem: Pain, Acute (Adult)  Goal: Acceptable Pain Control/Comfort Level  Outcome: Ongoing (interventions implemented as appropriate)    Problem: Older Inpatient, Acutely Ill (Adult)  Goal: Signs and Symptoms of Listed Potential Problems Will be Absent or Manageable (Older Inpatient, Acutely Ill)  Outcome: Ongoing (interventions implemented as appropriate)

## 2017-09-11 ENCOUNTER — APPOINTMENT (OUTPATIENT)
Dept: GENERAL RADIOLOGY | Facility: HOSPITAL | Age: 67
End: 2017-09-11

## 2017-09-11 LAB
ANION GAP SERPL CALCULATED.3IONS-SCNC: 10.2 MMOL/L
BACTERIA SPEC RESP CULT: NORMAL
BACTERIA SPEC RESP CULT: NORMAL
BASOPHILS # BLD AUTO: 0.02 10*3/MM3 (ref 0–0.2)
BASOPHILS NFR BLD AUTO: 0.1 % (ref 0–2.5)
BH CV ECHO MEAS - % IVS THICK: 45 %
BH CV ECHO MEAS - % LVPW THICK: 17.9 %
BH CV ECHO MEAS - AI END-D VEL: 294.1 CM/SEC
BH CV ECHO MEAS - AO ACC SLOPE: 927.7 CM/SEC^2
BH CV ECHO MEAS - AO ACC TIME: 0.12 SEC
BH CV ECHO MEAS - AO ROOT AREA (BSA CORRECTED): 2
BH CV ECHO MEAS - AO ROOT AREA: 9.6 CM^2
BH CV ECHO MEAS - AO ROOT DIAM: 3.5 CM
BH CV ECHO MEAS - BSA(HAYCOCK): 1.7 M^2
BH CV ECHO MEAS - BSA: 1.7 M^2
BH CV ECHO MEAS - BZI_BMI: 26.4 KILOGRAMS/M^2
BH CV ECHO MEAS - BZI_METRIC_HEIGHT: 160 CM
BH CV ECHO MEAS - BZI_METRIC_WEIGHT: 67.6 KG
BH CV ECHO MEAS - CONTRAST EF 4CH: 41.8 ML/M^2
BH CV ECHO MEAS - EDV(CUBED): 374.3 ML
BH CV ECHO MEAS - EDV(MOD-SP4): 98 ML
BH CV ECHO MEAS - EDV(TEICH): 272.7 ML
BH CV ECHO MEAS - EF(CUBED): 55.2 %
BH CV ECHO MEAS - EF(MOD-SP4): 41.8 %
BH CV ECHO MEAS - EF(TEICH): 45.6 %
BH CV ECHO MEAS - ESV(CUBED): 167.6 ML
BH CV ECHO MEAS - ESV(MOD-SP4): 57 ML
BH CV ECHO MEAS - ESV(TEICH): 148.2 ML
BH CV ECHO MEAS - FS: 23.5 %
BH CV ECHO MEAS - IVS/LVPW: 0.71
BH CV ECHO MEAS - IVSD: 1.1 CM
BH CV ECHO MEAS - IVSS: 1.3 CM
BH CV ECHO MEAS - LA DIMENSION: 4.6 CM
BH CV ECHO MEAS - LA/AO: 1.3
BH CV ECHO MEAS - LV DIASTOLIC VOL/BSA (35-75): 57.4 ML/M^2
BH CV ECHO MEAS - LV MASS(C)D: 356.3 GRAMS
BH CV ECHO MEAS - LV MASS(C)DI: 208.8 GRAMS/M^2
BH CV ECHO MEAS - LV MASS(C)S: 320.7 GRAMS
BH CV ECHO MEAS - LV MASS(C)SI: 188 GRAMS/M^2
BH CV ECHO MEAS - LV MAX PG: 4.7 MMHG
BH CV ECHO MEAS - LV MEAN PG: 2.1 MMHG
BH CV ECHO MEAS - LV SYSTOLIC VOL/BSA (12-30): 33.4 ML/M^2
BH CV ECHO MEAS - LV V1 MAX: 108.6 CM/SEC
BH CV ECHO MEAS - LV V1 MEAN: 65.7 CM/SEC
BH CV ECHO MEAS - LV V1 VTI: 22.2 CM
BH CV ECHO MEAS - LVIDD: 7.2 CM
BH CV ECHO MEAS - LVIDS: 5.5 CM
BH CV ECHO MEAS - LVLD AP4: 8 CM
BH CV ECHO MEAS - LVLS AP4: 7.2 CM
BH CV ECHO MEAS - LVOT AREA (M): 3.8 CM^2
BH CV ECHO MEAS - LVOT AREA: 3.9 CM^2
BH CV ECHO MEAS - LVOT DIAM: 2.2 CM
BH CV ECHO MEAS - LVPWD: 1.2 CM
BH CV ECHO MEAS - LVPWS: 1.4 CM
BH CV ECHO MEAS - MV A MAX VEL: 99.7 CM/SEC
BH CV ECHO MEAS - MV DEC SLOPE: 683.4 CM/SEC^2
BH CV ECHO MEAS - MV E MAX VEL: 104.1 CM/SEC
BH CV ECHO MEAS - MV E/A: 1
BH CV ECHO MEAS - MV MAX PG: 4.3 MMHG
BH CV ECHO MEAS - MV MEAN PG: 1.5 MMHG
BH CV ECHO MEAS - MV P1/2T MAX VEL: 97.2 CM/SEC
BH CV ECHO MEAS - MV P1/2T: 41.7 MSEC
BH CV ECHO MEAS - MV V2 MAX: 104.1 CM/SEC
BH CV ECHO MEAS - MV V2 MEAN: 54.3 CM/SEC
BH CV ECHO MEAS - MV V2 VTI: 25.8 CM
BH CV ECHO MEAS - MVA P1/2T LCG: 2.3 CM^2
BH CV ECHO MEAS - MVA(P1/2T): 5.3 CM^2
BH CV ECHO MEAS - MVA(VTI): 3.4 CM^2
BH CV ECHO MEAS - PA ACC SLOPE: 1140 CM/SEC^2
BH CV ECHO MEAS - PA ACC TIME: 0.05 SEC
BH CV ECHO MEAS - PA MAX PG: 1.2 MMHG
BH CV ECHO MEAS - PA MEAN PG: 0.74 MMHG
BH CV ECHO MEAS - PA PR(ACCEL): 55.2 MMHG
BH CV ECHO MEAS - PA V2 MAX: 55.3 CM/SEC
BH CV ECHO MEAS - PA V2 MEAN: 40.8 CM/SEC
BH CV ECHO MEAS - PA V2 VTI: 11.8 CM
BH CV ECHO MEAS - RAP SYSTOLE: 10 MMHG
BH CV ECHO MEAS - RVSP: 29 MMHG
BH CV ECHO MEAS - SI(CUBED): 121.1 ML/M^2
BH CV ECHO MEAS - SI(LVOT): 51 ML/M^2
BH CV ECHO MEAS - SI(MOD-SP4): 24 ML/M^2
BH CV ECHO MEAS - SI(TEICH): 72.9 ML/M^2
BH CV ECHO MEAS - SV(CUBED): 206.7 ML
BH CV ECHO MEAS - SV(LVOT): 87 ML
BH CV ECHO MEAS - SV(MOD-SP4): 41 ML
BH CV ECHO MEAS - SV(TEICH): 124.5 ML
BH CV ECHO MEAS - TR MAX VEL: 214.5 CM/SEC
BUN BLD-MCNC: 31 MG/DL (ref 7–20)
BUN/CREAT SERPL: 38.8 (ref 6.3–21.9)
CALCIUM SPEC-SCNC: 8.8 MG/DL (ref 8.4–10.2)
CHLORIDE SERPL-SCNC: 99 MMOL/L (ref 98–107)
CO2 SERPL-SCNC: 36 MMOL/L (ref 26–30)
CREAT BLD-MCNC: 0.8 MG/DL (ref 0.6–1.3)
DEPRECATED RDW RBC AUTO: 41.5 FL (ref 37–54)
EOSINOPHIL # BLD AUTO: 0.02 10*3/MM3 (ref 0–0.7)
EOSINOPHIL NFR BLD AUTO: 0.1 % (ref 0–7)
ERYTHROCYTE [DISTWIDTH] IN BLOOD BY AUTOMATED COUNT: 12.1 % (ref 11.5–14.5)
GFR SERPL CREATININE-BSD FRML MDRD: 96 ML/MIN/1.73
GLUCOSE BLD-MCNC: 138 MG/DL (ref 74–98)
GLUCOSE BLDC GLUCOMTR-MCNC: 134 MG/DL (ref 70–130)
GLUCOSE BLDC GLUCOMTR-MCNC: 142 MG/DL (ref 70–130)
GLUCOSE BLDC GLUCOMTR-MCNC: 156 MG/DL (ref 70–130)
GRAM STN SPEC: NORMAL
GRAM STN SPEC: NORMAL
HCT VFR BLD AUTO: 35.9 % (ref 42–52)
HGB BLD-MCNC: 11.1 G/DL (ref 14–18)
IMM GRANULOCYTES # BLD: 0.23 10*3/MM3 (ref 0–0.06)
IMM GRANULOCYTES NFR BLD: 1.5 % (ref 0–0.6)
LEFT ATRIUM VOLUME INDEX: 40 ML/M2
LV EF 2D ECHO EST: 42 %
LYMPHOCYTES # BLD AUTO: 0.52 10*3/MM3 (ref 0.6–3.4)
LYMPHOCYTES NFR BLD AUTO: 3.3 % (ref 10–50)
MCH RBC QN AUTO: 29.1 PG (ref 27–31)
MCHC RBC AUTO-ENTMCNC: 30.9 G/DL (ref 30–37)
MCV RBC AUTO: 94.2 FL (ref 80–94)
MONOCYTES # BLD AUTO: 0.61 10*3/MM3 (ref 0–0.9)
MONOCYTES NFR BLD AUTO: 3.9 % (ref 0–12)
NEUTROPHILS # BLD AUTO: 14.38 10*3/MM3 (ref 2–6.9)
NEUTROPHILS NFR BLD AUTO: 91.1 % (ref 37–80)
NRBC BLD MANUAL-RTO: 0 /100 WBC (ref 0–0)
PLAT MORPH BLD: NORMAL
PLATELET # BLD AUTO: 203 10*3/MM3 (ref 130–400)
PMV BLD AUTO: 10.4 FL (ref 6–12)
POTASSIUM BLD-SCNC: 4.2 MMOL/L (ref 3.5–5.1)
RBC # BLD AUTO: 3.81 10*6/MM3 (ref 4.7–6.1)
RBC MORPH BLD: NORMAL
SODIUM BLD-SCNC: 141 MMOL/L (ref 137–145)
WBC MORPH BLD: NORMAL
WBC NRBC COR # BLD: 15.78 10*3/MM3 (ref 4.8–10.8)

## 2017-09-11 PROCEDURE — 25010000002 AZITHROMYCIN: Performed by: INTERNAL MEDICINE

## 2017-09-11 PROCEDURE — 63710000001 INSULIN ASPART PER 5 UNITS: Performed by: INTERNAL MEDICINE

## 2017-09-11 PROCEDURE — 94799 UNLISTED PULMONARY SVC/PX: CPT

## 2017-09-11 PROCEDURE — 71020 HC CHEST PA AND LATERAL: CPT

## 2017-09-11 PROCEDURE — 87205 SMEAR GRAM STAIN: CPT | Performed by: NURSE PRACTITIONER

## 2017-09-11 PROCEDURE — 82962 GLUCOSE BLOOD TEST: CPT

## 2017-09-11 PROCEDURE — 25010000002 ENOXAPARIN PER 10 MG: Performed by: INTERNAL MEDICINE

## 2017-09-11 PROCEDURE — 85007 BL SMEAR W/DIFF WBC COUNT: CPT | Performed by: INTERNAL MEDICINE

## 2017-09-11 PROCEDURE — 25010000002 CEFTRIAXONE: Performed by: INTERNAL MEDICINE

## 2017-09-11 PROCEDURE — 99232 SBSQ HOSP IP/OBS MODERATE 35: CPT | Performed by: NURSE PRACTITIONER

## 2017-09-11 PROCEDURE — 80048 BASIC METABOLIC PNL TOTAL CA: CPT | Performed by: INTERNAL MEDICINE

## 2017-09-11 PROCEDURE — 85025 COMPLETE CBC W/AUTO DIFF WBC: CPT | Performed by: INTERNAL MEDICINE

## 2017-09-11 PROCEDURE — 97165 OT EVAL LOW COMPLEX 30 MIN: CPT

## 2017-09-11 PROCEDURE — 25010000002 METHYLPREDNISOLONE PER 40 MG: Performed by: INTERNAL MEDICINE

## 2017-09-11 PROCEDURE — 94660 CPAP INITIATION&MGMT: CPT

## 2017-09-11 RX ADMIN — GUAIFENESIN 1200 MG: 600 TABLET, EXTENDED RELEASE ORAL at 17:49

## 2017-09-11 RX ADMIN — LOSARTAN POTASSIUM 100 MG: 50 TABLET, FILM COATED ORAL at 09:06

## 2017-09-11 RX ADMIN — ESCITALOPRAM OXALATE 10 MG: 10 TABLET ORAL at 21:14

## 2017-09-11 RX ADMIN — METHYLPREDNISOLONE SODIUM SUCCINATE 40 MG: 40 INJECTION, POWDER, FOR SOLUTION INTRAMUSCULAR; INTRAVENOUS at 02:48

## 2017-09-11 RX ADMIN — ASPIRIN 81 MG: 81 TABLET, COATED ORAL at 09:06

## 2017-09-11 RX ADMIN — METHYLPREDNISOLONE SODIUM SUCCINATE 40 MG: 40 INJECTION, POWDER, FOR SOLUTION INTRAMUSCULAR; INTRAVENOUS at 17:49

## 2017-09-11 RX ADMIN — GUAIFENESIN 1200 MG: 600 TABLET, EXTENDED RELEASE ORAL at 09:04

## 2017-09-11 RX ADMIN — TAMSULOSIN HYDROCHLORIDE 0.4 MG: 0.4 CAPSULE ORAL at 21:14

## 2017-09-11 RX ADMIN — CLOPIDOGREL BISULFATE 75 MG: 75 TABLET ORAL at 09:06

## 2017-09-11 RX ADMIN — ENOXAPARIN SODIUM 40 MG: 40 INJECTION SUBCUTANEOUS at 09:03

## 2017-09-11 RX ADMIN — FINASTERIDE 5 MG: 5 TABLET, FILM COATED ORAL at 09:06

## 2017-09-11 RX ADMIN — ATORVASTATIN CALCIUM 40 MG: 40 TABLET, FILM COATED ORAL at 09:05

## 2017-09-11 RX ADMIN — IPRATROPIUM BROMIDE AND ALBUTEROL SULFATE 3 ML: .5; 3 SOLUTION RESPIRATORY (INHALATION) at 00:20

## 2017-09-11 RX ADMIN — TRAMADOL HYDROCHLORIDE 50 MG: 50 TABLET, COATED ORAL at 05:19

## 2017-09-11 RX ADMIN — IPRATROPIUM BROMIDE AND ALBUTEROL SULFATE 3 ML: .5; 3 SOLUTION RESPIRATORY (INHALATION) at 07:19

## 2017-09-11 RX ADMIN — CEFTRIAXONE 1 G: 1 INJECTION, POWDER, FOR SOLUTION INTRAMUSCULAR; INTRAVENOUS at 15:30

## 2017-09-11 RX ADMIN — CARVEDILOL 6.25 MG: 6.25 TABLET, FILM COATED ORAL at 17:49

## 2017-09-11 RX ADMIN — AZITHROMYCIN 500 MG: 500 INJECTION, POWDER, LYOPHILIZED, FOR SOLUTION INTRAVENOUS at 16:49

## 2017-09-11 RX ADMIN — AMLODIPINE BESYLATE 5 MG: 5 TABLET ORAL at 09:04

## 2017-09-11 RX ADMIN — CARVEDILOL 6.25 MG: 6.25 TABLET, FILM COATED ORAL at 09:00

## 2017-09-11 RX ADMIN — IPRATROPIUM BROMIDE AND ALBUTEROL SULFATE 3 ML: .5; 3 SOLUTION RESPIRATORY (INHALATION) at 19:34

## 2017-09-11 RX ADMIN — METHYLPREDNISOLONE SODIUM SUCCINATE 40 MG: 40 INJECTION, POWDER, FOR SOLUTION INTRAMUSCULAR; INTRAVENOUS at 09:07

## 2017-09-11 RX ADMIN — SPIRONOLACTONE 12.5 MG: 25 TABLET, FILM COATED ORAL at 09:07

## 2017-09-11 RX ADMIN — IPRATROPIUM BROMIDE AND ALBUTEROL SULFATE 3 ML: .5; 3 SOLUTION RESPIRATORY (INHALATION) at 12:46

## 2017-09-11 RX ADMIN — INSULIN ASPART 2 UNITS: 100 INJECTION, SOLUTION INTRAVENOUS; SUBCUTANEOUS at 11:23

## 2017-09-11 NOTE — PROGRESS NOTES
"Adult Nutrition  Assessment/PES    Patient Name:  Thurman Mendel Parsons  YOB: 1950  MRN: [id number]  Admit Date:  9/8/2017    Assessment Date:  9/11/2017        Reason for Assessment       09/11/17 0856    Reason for Assessment    Reason For Assessment/Visit admission assessment    Identified At Risk By Screening Criteria diagnosis    Diagnosis Diagnosis    Cardiac CAD;MI;HTN    Endocrine DM Type 2    Pulmonary/Critical Care Pneumonia;Acute respiratory failure;Hypercapnea    Renal CKD    Factors Affecting Nutrition Factors    Appetite Good                Anthropometrics       09/11/17 0858    Anthropometrics    Height Method Stated    Height 160 cm (63\")    Weight Method Bed scale    Weight 68 kg (149 lb 14.6 oz)    Ideal Body Weight (IBW)    Ideal Body Weight (IBW), Male (kg) 56.92    % Ideal Body Weight 119.71    Body Mass Index (BMI)    BMI (kg/m2) 26.61    BMI Grade 25 - 29.9 - overweight            Labs/Tests/Procedures/Meds       09/11/17 0859    Labs/Tests/Procedures/Meds    Labs/Tests Review Reviewed;Glucose;BUN;Hgb Hct   High: Glucose, BUN    Medication Review Reviewed, pertinent;Antibiotic;Insulin;Steroid            Physical Findings       09/11/17 0859    Physical Findings/Assessment    Additional Documentation Physical Appearance (Group)    Physical Appearance    Overall Physical Appearance overweight            Estimated/Assessed Needs       09/11/17 0859    Calculation Measurements    Weight Used For Calculations 68 kg (149 lb 14.6 oz)    Height Used for Calculations 1.6 m (5' 3\")    Estimated/Assessed Energy Needs    Energy Need Method Wheeler-St Jossor    Age 67    RMR (Wheeler-St. Jeor Equation) 1350.12    Activity Factors (Wheeler St Jeor)  Out of bed, ambulatory  1.3    Estimated Kcal Range  ~7417-5172    Estimated/Assessed Protein Needs    Weight Used for Protein Calculation 68 kg (149 lb 14.6 oz)    Protein (gm/kg) 0.6    0.6 Gm Protein (gm) 40.8    Estimated Protein Range " ~40.8-51    Estimated/Assessed Fluid Needs    Fluid Need Method --   outut + 1000 ml            Nutrition Prescription Ordered       09/11/17 0900    Nutrition Prescription PO    Current PO Diet Dysphagia    Dysphagia Level 4  Mechanical soft no mixed consistencies    Fluid Consistency Thin   No straws    Common Modifiers Cardiac;Consistent Carbohydrate;Renal            Evaluation of Received Nutrient/Fluid Intake       09/11/17 0901    PO Evaluation    Number of Days PO Intake Evaluated 3 days    Number of Meals 7    % PO Intake 85              Problem/Interventions:        Problem 1       09/11/17 0902    Nutrition Diagnoses Problem 1    Problem 1 Altered Nutrition Related to Labs    Etiology (related to) Medical Diagnosis    Endocrine DM2    Renal CKD    Signs/Symptoms (evidenced by) Biochemical    Labs Reviewed Done    Specific Labs Noted Glucose;BUN            Problem 2       09/11/17 0903    Nutrition Diagnoses Problem 2    Problem 2 Increased Nutrient Needs    Macronutrient Fluid;Protein    Etiology (related to) Medical Diagnosis    Pulmonary/Critical Care Pneumonia    Signs/Symptoms (evidenced by) Other (comment)   pulmonary dysfunction                  Intervention Goal       09/11/17 0904    Intervention Goal    General Meet nutritional needs for age/condition    PO PO intake (%)    PO Intake % 75 %            Nutrition Intervention       09/11/17 0904    Nutrition Intervention    RD/Tech Action Encourage intake;Recommend/ordered;Supplement provided;Follow Tx progress;Interview for preference;Advise available snack    Recommended/Ordered Supplement            Nutrition Prescription       09/11/17 0904    Nutrition Prescription PO    PO Prescription Begin/change supplement    Supplement Nepro    Supplement Frequency 2 times a day    New PO Prescription Ordered? Yes    Other Orders    Obtain Weight Daily    Obtain Weight Ordered? No, recommended    Supplement Vitamin mineral supplement    Supplement Ordered?  No, recommended            Education/Evaluation       09/11/17 0905    Education    Education Provided education regarding;Education topics    Education Topics Diabetes    Monitor/Evaluation    Monitor Per protocol;PO intake;Supplement intake;Pertinent labs;Weight        Comments:  Rec. #1: Continue with current diet order. Pt. Consuming ~85% of meals on average per NSG. RD added Nepro BID. Rec. #2: Consider adding renal MVI. Consult RD PRN.     Electronically signed by:  Stefany Dhillon RD  09/11/17 9:06 AM

## 2017-09-11 NOTE — THERAPY EVALUATION
Acute Care - Occupational Therapy Initial Evaluation   [place]     Patient Name: Thurman Mendel Parsons  : 1950  MRN: [id number]  Today's Date: 2017  Onset of Illness/Injury or Date of Surgery Date: 17  Date of Referral to OT: 17  Referring Physician: Dr. Mendez    Admit Date: 2017       ICD-10-CM ICD-9-CM   1. Pneumonia of right lower lobe due to infectious organism J18.9 483.8   2. COPD exacerbation J44.1 491.21   3. Acute on chronic respiratory failure with hypercapnia J96.22 518.84   4. Impaired functional mobility and activity tolerance Z74.09 V49.89   5. Impaired mobility and ADLs Z74.09 799.89     Patient Active Problem List   Diagnosis   • Anxiety   • Atherosclerotic heart disease of native coronary artery without angina pectoris   • Atrial fibrillation   • Chronic bronchitis   • Chronic kidney disease, stage III (moderate)   • Steroid-dependent COPD   • Degeneration of cervical intervertebral disc   • Diabetes mellitus   • GERD without esophagitis   • Diverticulosis   • Hemorrhoids   • Hiatal hernia   • Hyperlipidemia   • Hypertension   • Kyphosis, acquired   • Mitral and aortic valve disease   • Phimosis   • Sleep apnea   • Enlarged prostate without lower urinary tract symptoms (luts)   • History of arthritis   • Back pain   • Cardiac pain   • Depression   • Hematuria   • Stroke syndrome   • History of ventricular septal defect   • Neck pain   • COPD exacerbation   • Pneumonia of right lower lobe due to infectious organism     Past Medical History:   Diagnosis Date   • Abnormal liver enzymes    • Acute bronchitis    • Anemia    • Anxiety    • Arthritis    • Atherosclerotic heart disease of native coronary artery without angina pectoris    • Atrial fibrillation    • Atypical chest pain    • Back pain    • BPH (benign prostatic hyperplasia)    • Cardiac pain    • Chronic bronchitis    • Chronic kidney disease    • COPD (chronic obstructive pulmonary disease)    •  Degeneration of cervical intervertebral disc    • Depression    • Diabetes mellitus    • Diverticulosis    • Dyspnea    • Esophageal reflux    • Esophagitis    • Gastritis    • Hematuria    • Hemorrhoids    • Hiatal hernia    • History of arthritis    • History of myocardial infarction    • History of peripheral neuropathy    • History of ventricular septal defect    • History of ventricular septal defect    • Hyperlipidemia    • Hypertension    • Infectious diarrhea    • Infectious diarrhea    • Kyphosis, acquired    • Lumbar radiculopathy    • Mitral and aortic valve disease    • Myocardial infarction    • Nephrolithiasis    • Phimosis    • Respiratory failure    • Sleep apnea    • Stroke syndrome    • Stroke syndrome    • Urinary calculus    • Urinary tract infection    • Ventral hernia      Past Surgical History:   Procedure Laterality Date   • HERNIA REPAIR     • SUPRAPUBIC CATHETER INSERTION      History of Percutaneous Catheter Placement Into Ureter   • THROAT SURGERY     • VSD REPAIR      History of VSD Repair By Patch Closure          OT ASSESSMENT FLOWSHEET (last 72 hours)      OT Evaluation       09/11/17 1120 09/11/17 0830 09/09/17 2000 09/09/17 1255 09/09/17 0800    Rehab Evaluation    Document Type evaluation  -   evaluation  -JG     Subjective Information agree to therapy;complains of;weakness  -   agree to therapy  -JG     Patient Effort, Rehab Treatment good  -   good  -JG     Symptoms Noted During/After Treatment shortness of breath  -        General Information    Patient Profile Review yes  -AH   yes  -JG     Onset of Illness/Injury or Date of Surgery Date 09/08/17  -   09/08/17  -G     Referring Physician Dr. Mendez  -   [place]  -     General Observations Pt received supine in bed with 5-6L O2 via nc.  -        Pertinent History Of Current Problem SOA, COPD, CAD, respiratory failure, CKD, HTN, LBP  -   SOA, COPD, CAD, resp failure, CKD, HTN, LBP  -JG      Precautions/Limitations fall precautions;oxygen therapy device and L/min  -        Prior Level of Function independent:;all household mobility;bathing;dressing;grooming;feeding  -   independent:;all household mobility;ADL's  -     Equipment Currently Used at Home walker, rolling;oxygen;bipap/ cpap;cane, straight;shower chair  -  cane, straight;commode;shower chair;walker, rolling  - cane, straight;commode;shower chair;walker, rolling  -     Plans/Goals Discussed With patient;agreed upon  -   patient  -JG     Risks Reviewed patient:;increased discomfort  -   patient:;increased discomfort  -G     Benefits Reviewed patient:;improve function;increase independence;increase strength  -   patient:;decrease pain;increase independence  -     Living Environment    Lives With alone  -   alone  - alone  -    Living Arrangements [place]  -   house  - house  -    Home Accessibility bed and bath on same level  -   bed and bath on same level  - stairs (1 railing present);stairs to enter home  -    Number of Stairs to Enter Home 1  -   1  - 2  -SP    Stair Railings at Home     outside, present on right side  -    Type of Financial/Environmental Concern     none  -    Transportation Available   family or friend will provide;car  -  car;family or friend will provide  -SP    Clinical Impression    Date of Referral to OT 09/08/17  -        OT Diagnosis ADL decline  -        Patient/Family Goals Statement Pt wants to return home  -        Criteria for Skilled Therapeutic Interventions Met yes;treatment indicated  -        Rehab Potential good, to achieve stated therapy goals  -        Therapy Frequency 3-5 times/wk  -        Anticipated Discharge Disposition home  -        Functional Level Prior    Ambulation 0-->independent  -        Transferring 0-->independent  -        Toileting 0-->independent  -        Bathing 0-->independent  -        Dressing 0-->independent   -        Eating 0-->independent  -        Communication 0-->understands/communicates without difficulty  -        Swallowing 0-->swallows foods/liquids without difficulty  -        Vital Signs    O2 Delivery Pre Treatment supplemental O2   5-6L  -        O2 Delivery Intra Treatment supplemental O2   5-6L  -        O2 Delivery Post Treatment supplemental O2   5-6L  -        Pain Assessment    Pain Assessment No/denies pain  -   No/denies pain  -     Vision Assessment/Intervention    Visual Impairment WFL with corrective lenses  -        Cognitive Assessment/Intervention    Current Cognitive/Communication Assessment functional  -        Orientation Status oriented x 4  -   oriented x 4  -G     Follows Commands/Answers Questions able to follow multi-step instructions  -        Personal Safety WNL/WFL  -        Personal Safety Interventions fall prevention program maintained;gait belt;nonskid shoes/slippers when out of bed  -        Short/Long Term Memory intact short term memory;intact long term memory  -        ROM (Range of Motion)    General ROM no range of motion deficits identified  -   no range of motion deficits identified  -     MMT (Manual Muscle Testing)    General MMT Assessment no strength deficits identified  -   no strength deficits identified  -JG     Muscle Tone Assessment    Muscle Tone Assessment  Bilateral Lower Extremities;Bilateral Upper Extremities  -MT       Bilateral Upper Extremities Muscle Tone Assessment  mildly decreased tone  -MT       Bilateral Lower Extremities Muscle Tone Assessment  mildly decreased tone  -MT       Bed Mobility, Assessment/Treatment    Bed Mob, Supine to Sit, [place] independent  -   independent  -JG     Transfer Assessment/Treatment    Transfers, Sit-Stand [place] contact guard assist  -   independent  -JG     Transfers, Stand-Sit [place] stand by assist  -        Transfers, Sit-Stand-Sit, Assist Device  rolling walker  -        Functional Mobility    Functional Mobility- Ind. Level contact guard assist  -        Functional Mobility- Device rolling walker  -        Functional Mobility-Distance (Feet) 232  -        Functional Mobility- Safety Issues supplemental O2  -        Upper Body Bathing Assessment/Training    UB Bathing Assess/Train, [place] Level minimum assist (75% patient effort)  -        Lower Body Bathing Assessment/Training    LB Bathing Assess/Train, [place] Level minimum assist (75% patient effort)  -        Upper Body Dressing Assessment/Training    UB Dressing Assess/Train, [place] set up required  -        Lower Body Dressing Assessment/Training    LB Dressing Assess/Train, [place] contact guard assist  -        Toileting Assessment/Training    Toileting Assess/Train, Indepen Level contact guard assist  -        Grooming Assessment/Training    Grooming Assess/Train, Indep[place] Level set up required  -        General Therapy Interventions    Planned Therapy Interventions ADL retraining;strengthening;transfer training;home exercise program;energy conservation  -        Positioning and Restraints    Pre-Treatment Position in bed  -   in bed  -     Post Treatment Position chair  -   chair  -     In Chair sitting;call light within reach;encouraged to call for assist  -   reclined;call light within reach;with other staff  -       09/09/17 0400 09/09/17 0000 09/08/17 9129          General Information    Equipment Currently Used at Home bipap/ cpap;walker, rolling;cane, straight  -TN        Functional Level Prior    Ambulation 1-->assistive equipment  -TN        Transferring 2-->assistive person  -TN        Toileting 2-->assistive person  -TN        Bathing 0-->independent  -TN        Dressing 0-->independent  -TN        Eating 0-->independent  -TN        Communication 0-->understands/communicates without difficulty  -TN        Swallowing 0-->swallows  foods/liquids without difficulty  -TN        Muscle Tone Assessment    Muscle Tone Assessment Bilateral Lower Extremities  -TN Bilateral Lower Extremities  -TN Bilateral Lower Extremities  -TN      Bilateral Lower Extremities Muscle Tone Assessment mildly decreased tone  -TN mildly decreased tone  -TN mildly decreased tone  -TN        User Key  (r) = Recorded By, (t) = Taken By, (c) = Cosigned By    Initials Name Effective Dates    BIANCA Sarkis Amadorn, PT 04/06/17 -      Akilah Gutierrez 10/26/16 -     TN Toshia Chang, RN 10/26/16 -     JUNE Duron, VERNON 10/26/16 -     DW Renetta Sheikh, RN 10/26/16 -     HAMIDA Lundy RN 02/08/17 -            Occupational Therapy Education     Title: PT OT SLP Therapies (Active)     Topic: Occupational Therapy (Active)     Point: ADL training (Done)    Description: Instruct learner(s) on proper safety adaptation and remediation techniques during self care or transfers.   Instruct in proper use of assistive devices.    Learning Progress Summary    Learner Readiness Method Response Comment Documented by Status   Patient Acceptance E VU Role of OT/POC  09/11/17 1331 Done    Acceptance E VU talked with pt about safety with mobility. Advised to get assist from nursing when walking secondary to lines and O2.  09/09/17 1431 Done    Acceptance E ROBERT,NR LACE TEACHING  ROOM ORIENTATION  SMOKING CESSATION TN 09/09/17 0215 Done   Family Acceptance E VU,NR LACE TEACHING  ROOM ORIENTATION  SMOKING CESSATION TN 09/09/17 0215 Done               Point: Home exercise program (Done)    Description: Instruct learner(s) on appropriate technique for monitoring, assisting and/or progressing therapeutic exercises/activities.    Learning Progress Summary    Learner Readiness Method Response Comment Documented by Status   Patient Acceptance E VU talked with pt about safety with mobility. Advised to get assist from nursing when walking secondary to lines and O2.  09/09/17 1431 Done    Acceptance E  VU,NR LACE TEACHING  ROOM ORIENTATION  SMOKING CESSATION TN 09/09/17 0215 Done   Family Acceptance E VU,NR LACE TEACHING  ROOM ORIENTATION  SMOKING CESSATION TN 09/09/17 0215 Done               Point: Precautions (Done)    Description: Instruct learner(s) on prescribed precautions during self-care and functional transfers.    Learning Progress Summary    Learner Readiness Method Response Comment Documented by Status   Patient Acceptance E VU talked with pt about safety with mobility. Advised to get assist from nursing when walking secondary to lines and O2.  09/09/17 1431 Done    Acceptance E VU,NR LACE TEACHING  ROOM ORIENTATION  SMOKING CESSATION TN 09/09/17 0215 Done   Family Acceptance E VU,NR LACE TEACHING  ROOM ORIENTATION  SMOKING CESSATION TN 09/09/17 0215 Done                      User Key     Initials Effective Dates Name Provider Type Discipline     04/06/17 -  Sarkis Brennan, PT Physical Therapist PT     10/26/16 -  Akilah Gutierrez Occupational Therapist OT    TN 10/26/16 -  Toshia Chang, RN Registered Nurse Nurse                  OT Recommendation and Plan  Anticipated Discharge Disposition: home  Planned Therapy Interventions: ADL retraining, strengthening, transfer training, home exercise program, energy conservation  Therapy Frequency: 3-5 times/wk  Plan of Care Review  Plan Of Care Reviewed With: patient  Progress: no change  Outcome Summary/Follow up Plan: Pt seen for OT evaluation today.  Pt presents with decline in ADL function and generalized weakness.  Pt is expected to benefit from skilled OT to improve his independence with self care and functional mobility tasks.          OT Goals       09/11/17 1331          Transfer Training OT LTG    Transfer Training OT LTG, Date Established 09/11/17  -      Transfer Training OT LTG, Time to Achieve by discharge  -      Transfer Training OT LTG, Activity Type sit to stand/stand to sit  -      Transfer Training OT LTG, [place] Level  independent  -      Transfer Training OT LTG, Outcome goal ongoing  -      Strength OT LTG    Strength Goal OT LTG, Date Established 09/11/17  -      Strength Goal OT LTG, Time to Achieve by discharge  -      Strength Goal OT LTG, Functional Goal Pt will participate in BUE strengthening ex 15 reps each using theraband for resistance.  -      Strength Goal OT LTG, Outcome goal ongoing  -      LB Dressing OT LTG    LB Dressing Goal OT LTG, Date Established 09/11/17  -      LB Dressing Goal OT LTG, Time to Achieve by discharge  -      LB Dressing Goal OT LTG, [place] Level set up required  -      LB Dressing Goal OT LTG, Outcome goal ongoing  -      Functional Mobility OT LTG    Functional Mobility Goal OT LTG, Date Established 09/11/17  -      Functional Mobility Goal OT LTG, Time to Achieve by discharge  -      Functional Mobility Goal OT LTG, [place] Level independent with device  -      Functional Mobility Goal OT LTG, Assist Device rolling walker  -      Functional Mobility Goal OT LTG, Distance to Achieve in hallway  -      Functional Mobility Goal OT LTG, Additional Goal Pt will walk 350' with sba using RW and O2  -      Functional Mobility Goal OT LTG, Outcome goal ongoing  -        User Key  (r) = Recorded By, (t) = Taken By, (c) = Cosigned By    Initials Name Provider Type    SOWMYA Gutierrez Occupational Therapist                Outcome Measures       09/11/17 1120 09/09/17 1400       How much help from another person do you currently need...    Turning from your back to your side while in flat bed without using bedrails?  4  -JG     Moving from lying on back to sitting on the side of a flat bed without bedrails?  4  -JG     Moving to and from a bed to a chair (including a wheelchair)?  4  -JG     Standing up from a chair using your arms (e.g., wheelchair, bedside chair)?  4  -JG     Climbing 3-5 steps with a railing?  4  -JG     To walk in hospital room?  4  -JG      AM-PAC 6 Clicks Score  24  -JG     How much help from another is currently needed...    Putting on and taking off regular lower body clothing? 3  -AH      Bathing (including washing, rinsing, and drying) 3  -AH      Toileting (which includes using toilet bed pan or urinal) 3  -AH      Putting on and taking off regular upper body clothing 4  -AH      Taking care of personal grooming (such as brushing teeth) 4  -AH      Eating meals 4  -      Score 21  -      Functional Assessment    Outcome Measure Options AM-PAC 6 Clicks Daily Activity (OT)  - AM-PAC 6 Clicks Basic Mobility (PT)  -JG       User Key  (r) = Recorded By, (t) = Taken By, (c) = Cosigned By    Initials Name Provider Type    BIANCA Brennan PT Physical Therapist     Akilah Gutierrez Occupational Therapist          Time Calculation:   OT Start Time: 1120    Therapy Charges for Today     Code Description Service Date Service Provider Modifiers Qty    [id number]  OT EVAL LOW COMPLEXITY 4 9/11/2017 Akilah Gutierrez GO 1               Akilah Gutierrez  9/11/2017

## 2017-09-11 NOTE — PLAN OF CARE
Problem: Patient Care Overview (Adult)  Goal: Plan of Care Review  Outcome: Ongoing (interventions implemented as appropriate)    09/11/17 1335   Coping/Psychosocial Response Interventions   Plan Of Care Reviewed With patient   Patient Care Overview   Progress improving         Problem: NPPV/CPAP (Adult)  Goal: Signs and Symptoms of Listed Potential Problems Will be Absent or Manageable (NPPV/CPAP)  Outcome: Ongoing (interventions implemented as appropriate)    09/11/17 1335   NPPV/CPAP   Problems Assessed (NPPV/CPAP) all   Problems Present (NPPV/CPAP) none

## 2017-09-11 NOTE — DISCHARGE INSTR - OTHER ORDERS
If you have any questions about your recovery, please call the [facility] Nurse Call Center at [phone]. A registered nurse is available 24 hours a day 7 days a week to assist you.

## 2017-09-11 NOTE — PLAN OF CARE
Problem: Patient Care Overview (Adult)  Goal: Plan of Care Review  Outcome: Ongoing (interventions implemented as appropriate)    09/11/17 1331   Coping/Psychosocial Response Interventions   Plan Of Care Reviewed With patient   Patient Care Overview   Progress no change   Outcome Evaluation   Outcome Summary/Follow up Plan Pt seen for OT evaluation today. Pt presents with decline in ADL function and generalized weakness. Pt is expected to benefit from skilled OT to improve his independence with self care and functional mobility tasks.         Problem: Inpatient Occupational Therapy  Goal: Transfer Training Goal 1 LTG- OT  Outcome: Ongoing (interventions implemented as appropriate)    09/11/17 1331   Transfer Training OT LTG   Transfer Training OT LTG, Date Established 09/11/17   Transfer Training OT LTG, Time to Achieve by discharge   Transfer Training OT LTG, Activity Type sit to stand/stand to sit   Transfer Training OT LTG, [place] Level independent   Transfer Training OT LTG, Outcome goal ongoing       Goal: Strength Goal LTG- OT  Outcome: Ongoing (interventions implemented as appropriate)    09/11/17 1331   Strength OT LTG   Strength Goal OT LTG, Date Established 09/11/17   Strength Goal OT LTG, Time to Achieve by discharge   Strength Goal OT LTG, Functional Goal Pt will participate in BUE strengthening ex 15 reps each using theraband for resistance.   Strength Goal OT LTG, Outcome goal ongoing       Goal: LB Dressing Goal LTG- OT  Outcome: Ongoing (interventions implemented as appropriate)    09/11/17 1331   LB Dressing OT LTG   LB Dressing Goal OT LTG, Date Established 09/11/17   LB Dressing Goal OT LTG, Time to Achieve by discharge   LB Dressing Goal OT LTG, [place] Level set up required   LB Dressing Goal OT LTG, Outcome goal ongoing       Goal: Functional Mobility Goal LTG- OT  Outcome: Ongoing (interventions implemented as appropriate)    09/11/17 1331   Functional Mobility OT LTG   Functional  Mobility Goal OT LTG, Date Established 09/11/17   Functional Mobility Goal OT LTG, Time to Achieve by discharge   Functional Mobility Goal OT LTG, [place] Level independent with device   Functional Mobility Goal OT LTG, Assist Device rolling walker   Functional Mobility Goal OT LTG, Distance to Achieve in hallway   Functional Mobility Goal OT LTG, Additional Goal Pt will walk 350' with sba using RW and O2   Functional Mobility Goal OT LTG, Outcome goal ongoing

## 2017-09-11 NOTE — PROGRESS NOTES
PROGRESS NOTE        Date of Admission: 9/8/2017  Length of Stay: 3  Primary Care Physician: Miguel Rojas MD    Subjective   Chief Complaint: Dyspnea, cough  HPI: This is a 67-year-old male who was admitted with a right basilar pneumonia and COPD exacerbation.  Patient has a history of COPD.  He was treated as an outpatient with doxycycline and steroids but did not really show any improvement.  When he came into the emergency room he was noted have oxygen saturations in the 80s.  He was placed on a nonrebreather pelvis his blood gas showed a PCO2 97 to he was switched to BiPAP.  After this he did start showing improvement.  He has been getting IV antibiotics in the form Rocephin and azithromycin as well as breathing treatments mucolytics and steroids.  He is still complaining of cough this morning which is productive with very thick brownish colored sputum.  He is getting mucolytics.  Overall he may be feeling somewhat better.  He was seen and evaluated by cardiology for an elevated troponin.  It is felt to be a type to myocardial injury secondary to supply demand mismatch.  Cardiology does not recommend any treatment for a non-ST elevated myocardial infarction.           Review Of Systems:   Review of Systems   General ROS: Patient denies any fevers, chills or loss of consciousness.    Psychological ROS: Denies any hallucinations and delusions.  Ophthalmic ROS: Denies any diplopia or transient loss of vision.  ENT ROS: Denies sore throat, nasal congestion or ear pain.   Allergy and Immunology ROS: Denies rash or itching.  Hematological and Lymphatic ROS: Denies neck swelling or easy bleeding.  Endocrine ROS: Denies any recent unintentional weight gain or loss.  Breast ROS: Denies any pain or swelling.  Respiratory ROS: cough productive with brown sputum and shortness of breath.   Cardiovascular ROS: Denies chest pain or palpitations. No history of exertional chest pain.   Gastrointestinal ROS: Denies nausea and  vomiting. Denies any abdominal pain. No diarrhea.  Genito-Urinary ROS: Denies dysuria or hematuria.  Musculoskeletal ROS: Denies back pain. No muscle pain. No calf pain.   Neurological ROS: Denies any focal weakness. No loss of consciousness. Denies any numbness. Denies neck pain.   Dermatological ROS: Denies any redness or pruritis.    Objective      Temp:  [97.6 °F (36.4 °C)-98.4 °F (36.9 °C)] 97.9 °F (36.6 °C)  Heart Rate:  [77-90] 77  Resp:  [18-20] 20  BP: (118-161)/(72-85) 145/85  Physical Exam    General Appearance:  Alert and cooperative, not in any acute distress.   Head:  Atraumatic and normocephalic, without obvious abnormality.   Eyes:          PERRLA, conjunctivae and sclerae normal, no Icterus. No pallor. Extra-occular movements are within normal limits.   Ears:  Ears appear intact with no abnormalities noted.   Throat: No oral lesions, no thrush, oral mucosa moist.   Neck: Supple, trachea midline, no thyromegaly, no carotid bruit.   Back:   No kyphoscoliosis present. No tenderness to palpation,   range of motion normal.   Lungs:   Chest shape is normal. Breath sounds heard bilaterally equally.  Rhonchi bilaterally. No Pleural rub or bronchial breathing.   Heart:  Normal S1 and S2, no murmur, no gallop, no rub. No JVD   Abdomen:   Normal bowel sounds, no masses, no organomegaly. Soft     non-tender, non-distended, no guarding, no rebound                tenderness   Extremities: Moves all extremities well, no edema, no cyanosis, no clubbing.   Pulses: Pulses palpable and equal bilaterally   Skin: No bleeding, bruising or rash   Lymph nodes: No palpable adenopathy   Neurologic:    Psychiatric/Behavior:     Cranial nerves 2 - 12 grossly intact, sensation intact, Motor power is normal and equal bilaterally.  Mood normal, behavior normal       Results Review:    I have reviewed the labs, radiology results and diagnostic studies.      Results from last 7 days  Lab Units 09/11/17  0538   WBC 10*3/mm3 15.78*    HEMOGLOBIN g/dL 11.1*   PLATELETS 10*3/mm3 203       Results from last 7 days  Lab Units 09/11/17  0538   SODIUM mmol/L 141   POTASSIUM mmol/L 4.2   CO2 mmol/L 36.0*   CREATININE mg/dL 0.80   GLUCOSE mg/dL 138*       Culture Data:   Blood Culture   Date Value Ref Range Status   09/08/2017 No growth at 2 days  Preliminary   09/08/2017 No growth at 3 days  Preliminary     Respiratory Culture   Date Value Ref Range Status   09/11/2017 Rejected  Final     Radiology Data:   Cardiology Data:    I have reviewed the medications.    Assessment/Plan     Assessment and Plan:  1.  Right basilar pneumonia present upon admission-patient's IV antibiotics in the form Rocephin and azithromycin breathing treatments mucolytics and IV steroids.  Chest x-ray has been ordered for further evaluation.  Blood cultures sputum cultures have been ordered as well.  Respiratory culture is pending.  Blood cultures have not shown any growth at this time.    2.  Acute on chronic hypoxichypercapnic respiratory failure- patient has been on BiPAP.  He does have a CPAP at home for which she is noncompliant.  He will need to be switched to BiPAP and started a CPAP given his hypercapnia and intolerance to CPAP.    3.  COPD with exacerbation-same treatment is #1    4.  Diabetes mellitus type 2-patient on insulin protocol.    5.  Type to myocardial injury secondary to supply demand mismatch    6.  Chronic essential hypertension    7.  Chronic kidney disease CK 23    8.  Coronary artery disease-medical management    9.  Obstructive sleep apnea patient has a CPAP machine at home however due to his hypercapnia he will need to be switched to BiPAP.      DVT prophylaxis:  Discharge Planning:   Monse Evans, PAL 09/11/17 3:10 PM

## 2017-09-11 NOTE — PLAN OF CARE
Problem: Patient Care Overview (Adult)  Goal: Plan of Care Review  Outcome: Ongoing (interventions implemented as appropriate)    09/11/17 0158   Coping/Psychosocial Response Interventions   Plan Of Care Reviewed With patient   Patient Care Overview   Progress no change   Outcome Evaluation   Outcome Summary/Follow up Plan Pt continues to become dyspnic with exertion. Productive cough noted. wears bipap as ordered.       Goal: Adult Individualization and Mutuality  Outcome: Ongoing (interventions implemented as appropriate)  Goal: Discharge Needs Assessment  Outcome: Ongoing (interventions implemented as appropriate)    Problem: Older Inpatient, Acutely Ill (Adult)  Goal: Signs and Symptoms of Listed Potential Problems Will be Absent or Manageable (Older Inpatient, Acutely Ill)  Outcome: Ongoing (interventions implemented as appropriate)

## 2017-09-11 NOTE — PLAN OF CARE
Problem: Patient Care Overview (Adult)  Goal: Plan of Care Review  Outcome: Ongoing (interventions implemented as appropriate)    09/11/17 1611   Coping/Psychosocial Response Interventions   Plan Of Care Reviewed With patient   Patient Care Overview   Progress improving   Outcome Evaluation   Outcome Summary/Follow up Plan Resp culture obtained and CXR completed today. Results not available att. Pt continues to have productive, frequent cough, brown color and appears to have blood in sputum. Awaiting culture results. Pt cooperative and pleasant. Walked in moser with OT today. Still weak but anxious to improve strength.       Goal: Adult Individualization and Mutuality  Outcome: Ongoing (interventions implemented as appropriate)  Goal: Discharge Needs Assessment  Outcome: Ongoing (interventions implemented as appropriate)    Problem: Pain, Acute (Adult)  Goal: Acceptable Pain Control/Comfort Level  Outcome: Ongoing (interventions implemented as appropriate)    Problem: Older Inpatient, Acutely Ill (Adult)  Goal: Signs and Symptoms of Listed Potential Problems Will be Absent or Manageable (Older Inpatient, Acutely Ill)  Outcome: Ongoing (interventions implemented as appropriate)    Problem: Pneumonia (Adult)  Goal: Signs and Symptoms of Listed Potential Problems Will be Absent or Manageable (Pneumonia)  Outcome: Ongoing (interventions implemented as appropriate)

## 2017-09-12 LAB
ANION GAP SERPL CALCULATED.3IONS-SCNC: 9.4 MMOL/L
BASOPHILS # BLD AUTO: 0.01 10*3/MM3 (ref 0–0.2)
BASOPHILS NFR BLD AUTO: 0.1 % (ref 0–2.5)
BUN BLD-MCNC: 29 MG/DL (ref 7–20)
BUN/CREAT SERPL: 36.3 (ref 6.3–21.9)
CALCIUM SPEC-SCNC: 8.8 MG/DL (ref 8.4–10.2)
CHLORIDE SERPL-SCNC: 100 MMOL/L (ref 98–107)
CO2 SERPL-SCNC: 35 MMOL/L (ref 26–30)
CREAT BLD-MCNC: 0.8 MG/DL (ref 0.6–1.3)
DEPRECATED RDW RBC AUTO: 40.7 FL (ref 37–54)
EOSINOPHIL # BLD AUTO: 0 10*3/MM3 (ref 0–0.7)
EOSINOPHIL NFR BLD AUTO: 0 % (ref 0–7)
ERYTHROCYTE [DISTWIDTH] IN BLOOD BY AUTOMATED COUNT: 12 % (ref 11.5–14.5)
GFR SERPL CREATININE-BSD FRML MDRD: 96 ML/MIN/1.73
GLUCOSE BLD-MCNC: 129 MG/DL (ref 74–98)
GLUCOSE BLDC GLUCOMTR-MCNC: 103 MG/DL (ref 70–130)
GLUCOSE BLDC GLUCOMTR-MCNC: 121 MG/DL (ref 70–130)
GLUCOSE BLDC GLUCOMTR-MCNC: 125 MG/DL (ref 70–130)
GLUCOSE BLDC GLUCOMTR-MCNC: 132 MG/DL (ref 70–130)
GLUCOSE BLDC GLUCOMTR-MCNC: 152 MG/DL (ref 70–130)
HCT VFR BLD AUTO: 35.8 % (ref 42–52)
HGB BLD-MCNC: 11.3 G/DL (ref 14–18)
IMM GRANULOCYTES # BLD: 0.12 10*3/MM3 (ref 0–0.06)
IMM GRANULOCYTES NFR BLD: 0.9 % (ref 0–0.6)
LYMPHOCYTES # BLD AUTO: 0.55 10*3/MM3 (ref 0.6–3.4)
LYMPHOCYTES NFR BLD AUTO: 4.3 % (ref 10–50)
MCH RBC QN AUTO: 29.3 PG (ref 27–31)
MCHC RBC AUTO-ENTMCNC: 31.6 G/DL (ref 30–37)
MCV RBC AUTO: 92.7 FL (ref 80–94)
MONOCYTES # BLD AUTO: 0.66 10*3/MM3 (ref 0–0.9)
MONOCYTES NFR BLD AUTO: 5.2 % (ref 0–12)
NEUTROPHILS # BLD AUTO: 11.39 10*3/MM3 (ref 2–6.9)
NEUTROPHILS NFR BLD AUTO: 89.5 % (ref 37–80)
NRBC BLD MANUAL-RTO: 0 /100 WBC (ref 0–0)
PLATELET # BLD AUTO: 197 10*3/MM3 (ref 130–400)
PMV BLD AUTO: 9.8 FL (ref 6–12)
POTASSIUM BLD-SCNC: 4.4 MMOL/L (ref 3.5–5.1)
RBC # BLD AUTO: 3.86 10*6/MM3 (ref 4.7–6.1)
SODIUM BLD-SCNC: 140 MMOL/L (ref 137–145)
WBC NRBC COR # BLD: 12.73 10*3/MM3 (ref 4.8–10.8)

## 2017-09-12 PROCEDURE — 94799 UNLISTED PULMONARY SVC/PX: CPT

## 2017-09-12 PROCEDURE — 97110 THERAPEUTIC EXERCISES: CPT

## 2017-09-12 PROCEDURE — 25010000002 AZITHROMYCIN: Performed by: INTERNAL MEDICINE

## 2017-09-12 PROCEDURE — 25010000002 ENOXAPARIN PER 10 MG: Performed by: INTERNAL MEDICINE

## 2017-09-12 PROCEDURE — 82962 GLUCOSE BLOOD TEST: CPT

## 2017-09-12 PROCEDURE — 80048 BASIC METABOLIC PNL TOTAL CA: CPT | Performed by: NURSE PRACTITIONER

## 2017-09-12 PROCEDURE — 25010000002 METHYLPREDNISOLONE PER 40 MG: Performed by: INTERNAL MEDICINE

## 2017-09-12 PROCEDURE — 85025 COMPLETE CBC W/AUTO DIFF WBC: CPT | Performed by: NURSE PRACTITIONER

## 2017-09-12 PROCEDURE — 25010000002 CEFTRIAXONE: Performed by: INTERNAL MEDICINE

## 2017-09-12 PROCEDURE — 94660 CPAP INITIATION&MGMT: CPT

## 2017-09-12 PROCEDURE — 99232 SBSQ HOSP IP/OBS MODERATE 35: CPT | Performed by: NURSE PRACTITIONER

## 2017-09-12 PROCEDURE — 97530 THERAPEUTIC ACTIVITIES: CPT

## 2017-09-12 RX ADMIN — LOSARTAN POTASSIUM 100 MG: 50 TABLET, FILM COATED ORAL at 09:25

## 2017-09-12 RX ADMIN — AZITHROMYCIN 500 MG: 500 INJECTION, POWDER, LYOPHILIZED, FOR SOLUTION INTRAVENOUS at 17:43

## 2017-09-12 RX ADMIN — ASPIRIN 81 MG: 81 TABLET, COATED ORAL at 09:25

## 2017-09-12 RX ADMIN — FINASTERIDE 5 MG: 5 TABLET, FILM COATED ORAL at 09:23

## 2017-09-12 RX ADMIN — SODIUM CHLORIDE 75 ML/HR: 4.5 INJECTION, SOLUTION INTRAVENOUS at 04:19

## 2017-09-12 RX ADMIN — CARVEDILOL 6.25 MG: 6.25 TABLET, FILM COATED ORAL at 09:24

## 2017-09-12 RX ADMIN — SPIRONOLACTONE 12.5 MG: 25 TABLET, FILM COATED ORAL at 09:24

## 2017-09-12 RX ADMIN — METHYLPREDNISOLONE SODIUM SUCCINATE 40 MG: 40 INJECTION, POWDER, FOR SOLUTION INTRAMUSCULAR; INTRAVENOUS at 02:00

## 2017-09-12 RX ADMIN — CARVEDILOL 6.25 MG: 6.25 TABLET, FILM COATED ORAL at 17:43

## 2017-09-12 RX ADMIN — HYDROCODONE POLISTIREX AND CHLORPHENIRAMINE POLISTIREX 5 ML: 10; 8 SUSPENSION, EXTENDED RELEASE ORAL at 15:01

## 2017-09-12 RX ADMIN — TAMSULOSIN HYDROCHLORIDE 0.4 MG: 0.4 CAPSULE ORAL at 22:03

## 2017-09-12 RX ADMIN — GUAIFENESIN 1200 MG: 600 TABLET, EXTENDED RELEASE ORAL at 17:43

## 2017-09-12 RX ADMIN — CEFTRIAXONE 1 G: 1 INJECTION, POWDER, FOR SOLUTION INTRAMUSCULAR; INTRAVENOUS at 15:01

## 2017-09-12 RX ADMIN — IPRATROPIUM BROMIDE AND ALBUTEROL SULFATE 3 ML: .5; 3 SOLUTION RESPIRATORY (INHALATION) at 12:46

## 2017-09-12 RX ADMIN — METHYLPREDNISOLONE SODIUM SUCCINATE 40 MG: 40 INJECTION, POWDER, FOR SOLUTION INTRAMUSCULAR; INTRAVENOUS at 10:54

## 2017-09-12 RX ADMIN — ESCITALOPRAM OXALATE 10 MG: 10 TABLET ORAL at 21:15

## 2017-09-12 RX ADMIN — ATORVASTATIN CALCIUM 40 MG: 40 TABLET, FILM COATED ORAL at 09:23

## 2017-09-12 RX ADMIN — IPRATROPIUM BROMIDE AND ALBUTEROL SULFATE 3 ML: .5; 3 SOLUTION RESPIRATORY (INHALATION) at 20:19

## 2017-09-12 RX ADMIN — IPRATROPIUM BROMIDE AND ALBUTEROL SULFATE 3 ML: .5; 3 SOLUTION RESPIRATORY (INHALATION) at 07:47

## 2017-09-12 RX ADMIN — AMLODIPINE BESYLATE 5 MG: 5 TABLET ORAL at 09:25

## 2017-09-12 RX ADMIN — ENOXAPARIN SODIUM 40 MG: 40 INJECTION SUBCUTANEOUS at 09:23

## 2017-09-12 RX ADMIN — CLOPIDOGREL BISULFATE 75 MG: 75 TABLET ORAL at 09:25

## 2017-09-12 RX ADMIN — METHYLPREDNISOLONE SODIUM SUCCINATE 40 MG: 40 INJECTION, POWDER, FOR SOLUTION INTRAMUSCULAR; INTRAVENOUS at 17:43

## 2017-09-12 RX ADMIN — IPRATROPIUM BROMIDE AND ALBUTEROL SULFATE 3 ML: .5; 3 SOLUTION RESPIRATORY (INHALATION) at 01:26

## 2017-09-12 RX ADMIN — GUAIFENESIN 1200 MG: 600 TABLET, EXTENDED RELEASE ORAL at 09:25

## 2017-09-12 NOTE — PLAN OF CARE
Problem: NPPV/CPAP (Adult)  Intervention: Prevent Aspiration During Therapy    09/12/17 0100   Positioning   Head Of Bed (HOB) Position HOB elevated       Intervention: Assess/Manage Patient Tolerance of Noninvasive Ventilation    09/12/17 0411   Respiratory Interventions   Airway/Ventilation Management airway patency maintained;pulmonary hygiene promoted       Intervention: Prevent/Minimize Device Friction/Shearing and Pressure Points    09/12/17 0411   Respiratory Interventions   NPPV/CPAP Maintenance other (see comments)  (proper mask fit)         Goal: Signs and Symptoms of Listed Potential Problems Will be Absent or Manageable (NPPV/CPAP)  Outcome: Ongoing (interventions implemented as appropriate)    09/12/17 0411   NPPV/CPAP   Problems Assessed (NPPV/CPAP) hypoxia/hypoxemia;situational response;skin breakdown;dry mucous membranes   Problems Present (NPPV/CPAP) none

## 2017-09-12 NOTE — PLAN OF CARE
Problem: Patient Care Overview (Adult)  Goal: Plan of Care Review  Outcome: Ongoing (interventions implemented as appropriate)  Goal: Discharge Needs Assessment  Outcome: Ongoing (interventions implemented as appropriate)    Problem: Pain, Acute (Adult)  Goal: Acceptable Pain Control/Comfort Level  Outcome: Ongoing (interventions implemented as appropriate)    Problem: Older Inpatient, Acutely Ill (Adult)  Goal: Signs and Symptoms of Listed Potential Problems Will be Absent or Manageable (Older Inpatient, Acutely Ill)  Outcome: Ongoing (interventions implemented as appropriate)    Problem: Pneumonia (Adult)  Goal: Signs and Symptoms of Listed Potential Problems Will be Absent or Manageable (Pneumonia)  Outcome: Ongoing (interventions implemented as appropriate)

## 2017-09-12 NOTE — PLAN OF CARE
Problem: Patient Care Overview (Adult)  Goal: Plan of Care Review  Outcome: Ongoing (interventions implemented as appropriate)    09/12/17 0550   Coping/Psychosocial Response Interventions   Plan Of Care Reviewed With patient   Patient Care Overview   Progress no change   Outcome Evaluation   Outcome Summary/Follow up Plan Patient had a few episodes of bright red tinged sputum. according to patient it has only happened on occasion a few different times. patient wore bipap all night       Goal: Adult Individualization and Mutuality  Outcome: Ongoing (interventions implemented as appropriate)    09/12/17 0550   Individualization   Patient Specific Goals go home soon   Patient Specific Interventions patient continues on antibiotics and fluids. Patient used bipap all night last night.       Goal: Discharge Needs Assessment  Outcome: Ongoing (interventions implemented as appropriate)    09/09/17 2000 09/11/17 1120 09/12/17 0550   Discharge Needs Assessment   Concerns To Be Addressed --  --  denies needs/concerns at this time   Readmission Within The Last 30 Days --  --  no previous admission in last 30 days   Equipment Needed After Discharge oxygen;cane, straight;commode;shower chair;walker, rolling --  --    Current Discharge Risk --  --  lives alone   Discharge Disposition --  --  still a patient   Current Health   Anticipated Changes Related to Illness --  --  none   Self-Care   Equipment Currently Used at Home --  walker, rolling;oxygen;bipap/ cpap;cane, straight;shower chair --    Living Environment   Transportation Available --  --  car;family or friend will provide         Problem: Pain, Acute (Adult)  Goal: Acceptable Pain Control/Comfort Level  Outcome: Ongoing (interventions implemented as appropriate)    09/12/17 0550   Pain, Acute (Adult)   Acceptable Pain Control/Comfort Level making progress toward outcome         Problem: Older Inpatient, Acutely Ill (Adult)  Goal: Signs and Symptoms of Listed Potential  Problems Will be Absent or Manageable (Older Inpatient, Acutely Ill)  Outcome: Ongoing (interventions implemented as appropriate)    09/12/17 0550   Older Adult Inpatient, Acutely Ill   Problems Assessed (Acutely Ill Older Adult) functional decline/self-care deficit;skin breakdown   Problems Present (Acutely Ill Older Adult) functional decline/self-care deficit         Problem: Pneumonia (Adult)  Goal: Signs and Symptoms of Listed Potential Problems Will be Absent or Manageable (Pneumonia)  Outcome: Ongoing (interventions implemented as appropriate)    09/11/17 1611 09/12/17 0550   Pneumonia   Problems Assessed (Pneumonia) all --    Problems Present (Pneumonia) --  respiratory compromise

## 2017-09-12 NOTE — PLAN OF CARE
Problem: Patient Care Overview (Adult)  Goal: Plan of Care Review  Outcome: Ongoing (interventions implemented as appropriate)    09/12/17 1554   Coping/Psychosocial Response Interventions   Plan Of Care Reviewed With patient   Patient Care Overview   Progress improving   Outcome Evaluation   Outcome Summary/Follow up Plan OT tx completed. Refer to flow sheet. patient walked 164' with CGA and RW. C/O SOA. Continue OT POC         Problem: Inpatient Occupational Therapy  Goal: Transfer Training Goal 1 LTG- OT  Outcome: Ongoing (interventions implemented as appropriate)    09/11/17 1331 09/12/17 1554   Transfer Training OT LTG   Transfer Training OT LTG, Date Established 09/11/17 --    Transfer Training OT LTG, Time to Achieve by discharge --    Transfer Training OT LTG, Activity Type sit to stand/stand to sit --    Transfer Training OT LTG, [place] Level independent --    Transfer Training OT LTG, Date Goal Reviewed --  09/12/17   Transfer Training OT LTG, Outcome --  goal ongoing       Goal: Strength Goal LTG- OT  Outcome: Outcome(s) achieved Date Met:  09/12/17 09/11/17 1331 09/12/17 1554   Strength OT LTG   Strength Goal OT LTG, Date Established 09/11/17 --    Strength Goal OT LTG, Time to Achieve by discharge --    Strength Goal OT LTG, Functional Goal Pt will participate in BUE strengthening ex 15 reps each using theraband for resistance. --    Strength Goal OT LTG, Date Goal Reviewed --  09/12/17   Strength Goal OT LTG, Outcome --  goal met       Goal: LB Dressing Goal LTG- OT  Outcome: Ongoing (interventions implemented as appropriate)    09/11/17 1331 09/12/17 1554   LB Dressing OT LTG   LB Dressing Goal OT LTG, Date Established 09/11/17 --    LB Dressing Goal OT LTG, Time to Achieve by discharge --    LB Dressing Goal OT LTG, [place] Level set up required --    LB Dressing Goal OT LTG, Date Goal Reviewed --  09/12/17   LB Dressing Goal OT LTG, Outcome --  goal ongoing       Goal: Functional  Mobility Goal LTG- OT  Outcome: Ongoing (interventions implemented as appropriate)    09/11/17 1331 09/12/17 1554   Functional Mobility OT LTG   Functional Mobility Goal OT LTG, Date Established 09/11/17 --    Functional Mobility Goal OT LTG, Time to Achieve by discharge --    Functional Mobility Goal OT LTG, [place] Level independent with device --    Functional Mobility Goal OT LTG, Assist Device rolling walker --    Functional Mobility Goal OT LTG, Distance to Achieve in hallway --    Functional Mobility Goal OT LTG, Additional Goal Pt will walk 350' with sba using RW and O2 --    Functional Mobility Goal OT LTG, Date Goal Reviewed --  09/12/17   Functional Mobility Goal OT LTG, Outcome --  goal ongoing

## 2017-09-12 NOTE — PROGRESS NOTES
PROGRESS NOTE        Date of Admission: 9/8/2017  Length of Stay: 4  Primary Care Physician: Miguel Rojas MD    Subjective   Chief Complaint: Dyspnea, cough  HPI:  Patient was seen today.  He is sitting up in bed.  PT/OT consulted.  I have had a long discussion with the patient who lives alone regarding rehab placement.  He will give this some consideration.    From the respiratory standpoint he is feeling some better.  He still has a productive cough.  Shortness of breath has improved.  Initially thought he had a CPAP at home and would need to be switched to BiPAP but it does appear that he does have a BiPAP at home.  According to the patient due to living alone and difficulty getting the BiPAP mask on he is not the most compliant with his BiPAP.  He denies any chest pain he is short of breath he denies any nausea vomiting.  X-ray done yesterday showed small bilateral pleural effusions and bibasilar opacities which may reflect atelectasis or pneumonia.    This is a 67-year-old male who was admitted with a right basilar pneumonia and COPD exacerbation.  Patient has a history of COPD.  He was treated as an outpatient with doxycycline and steroids but did not really show any improvement.  When he came into the emergency room he was noted have oxygen saturations in the 80s.  He was placed on a nonrebreather pelvis his blood gas showed a PCO2 97 to he was switched to BiPAP.  After this he did start showing improvement.  He has been getting IV antibiotics in the form Rocephin and azithromycin as well as breathing treatments mucolytics and steroids.  He is still complaining of cough this morning which is productive with very thick brownish colored sputum.  He is getting mucolytics.  Overall he may be feeling somewhat better.  He was seen and evaluated by cardiology for an elevated troponin.  It is felt to be a type to myocardial injury secondary to supply demand mismatch.  Cardiology does not recommend any treatment  for a non-ST elevated myocardial infarction.           Review Of Systems:   Review of Systems   General ROS: Patient denies any fevers, chills or loss of consciousness.    Psychological ROS: Denies any hallucinations and delusions.  Ophthalmic ROS: Denies any diplopia or transient loss of vision.  ENT ROS: Denies sore throat, nasal congestion or ear pain.   Allergy and Immunology ROS: Denies rash or itching.  Hematological and Lymphatic ROS: Denies neck swelling or easy bleeding.  Endocrine ROS: Denies any recent unintentional weight gain or loss.  Breast ROS: Denies any pain or swelling.  Respiratory ROS: cough productive with brown sputum and shortness of breath.   Cardiovascular ROS: Denies chest pain or palpitations. No history of exertional chest pain.   Gastrointestinal ROS: Denies nausea and vomiting. Denies any abdominal pain. No diarrhea.  Genito-Urinary ROS: Denies dysuria or hematuria.  Musculoskeletal ROS: Denies back pain. No muscle pain. No calf pain.   Neurological ROS: Denies any focal weakness. No loss of consciousness. Denies any numbness. Denies neck pain.   Dermatological ROS: Denies any redness or pruritis.    Objective      Temp:  [97.8 °F (36.6 °C)-98.1 °F (36.7 °C)] 98.1 °F (36.7 °C)  Heart Rate:  [] 86  Resp:  [18-20] 18  BP: (137-177)/(74-92) 137/76  Physical Exam    General Appearance:  Alert and cooperative, not in any acute distress.   Head:  Atraumatic and normocephalic, without obvious abnormality.   Eyes:          PERRLA, conjunctivae and sclerae normal, no Icterus. No pallor. Extra-occular movements are within normal limits.   Ears:  Ears appear intact with no abnormalities noted.   Throat: No oral lesions, no thrush, oral mucosa moist.   Neck: Supple, trachea midline, no thyromegaly, no carotid bruit.   Back:   No kyphoscoliosis present. No tenderness to palpation,   range of motion normal.   Lungs:   Chest shape is normal. Breath sounds heard bilaterally equally.  Rhonchi  bilaterally. No Pleural rub or bronchial breathing.   Heart:  Normal S1 and S2, no murmur, no gallop, no rub. No JVD   Abdomen:   Normal bowel sounds, no masses, no organomegaly. Soft     non-tender, non-distended, no guarding, no rebound                tenderness   Extremities: Moves all extremities well, no edema, no cyanosis, no clubbing.   Pulses: Pulses palpable and equal bilaterally   Skin: No bleeding, bruising or rash   Lymph nodes: No palpable adenopathy   Neurologic:    Psychiatric/Behavior:     Cranial nerves 2 - 12 grossly intact, sensation intact, Motor power is normal and equal bilaterally.  Mood normal, behavior normal       Results Review:    I have reviewed the labs, radiology results and diagnostic studies.      Results from last 7 days  Lab Units 09/12/17  0630   WBC 10*3/mm3 12.73*   HEMOGLOBIN g/dL 11.3*   PLATELETS 10*3/mm3 197       Results from last 7 days  Lab Units 09/12/17  0630   SODIUM mmol/L 140   POTASSIUM mmol/L 4.4   CO2 mmol/L 35.0*   CREATININE mg/dL 0.80   GLUCOSE mg/dL 129*       Culture Data:   Blood Culture   Date Value Ref Range Status   09/08/2017 No growth at 2 days  Preliminary   09/08/2017 No growth at 3 days  Preliminary     Respiratory Culture   Date Value Ref Range Status   09/11/2017 Rejected  Final     Radiology Data:   Cardiology Data:    I have reviewed the medications.    Assessment/Plan     Assessment and Plan:  1.  bibasilar pneumonia present upon admission-patient's IV antibiotics in the form Rocephin and azithromycin breathing treatments mucolytics and IV steroids.  Respiratory culture was rejected.  I have ordered a repeat sputum.    Blood cultures have not shown any growth at this time.    2.  Acute on chronic hypoxichypercapnic respiratory failure- patient has been on BiPAP.  He does have a BiPAP at home for which we've instructed the family to bring him.  According to the patient's difficult for her needs his BiPAP machine at home since he lives alone.  3.   COPD with exacerbation-same treatment is #1    4.  Diabetes mellitus type 2-patient on insulin protocol.    5.  Type II MI, secondary to supply demand mismatch-  radiology has seen and evaluated the patient does not feel this is a non-ST elevated myocardial infarction.  And will not treat as such.  He feels it is secondary to supply demand mismatch.    6.  Chronic essential hypertension-blood pressure stable continue to monitor.    7.  Chronic kidney disease CKD3-no function is at baseline if not better.    8.  Coronary artery disease-medical management    9.  Obstructive sleep apnea patient has a CPAP machine at home however due to his hypercapnia he will need to be switched to BiPAP.    10.  Denies weakness-PT OT has been consulted.  He did have a discussion with the patient regarding rehabilitation placement for which he will give some consideration.    DVT prophylaxis:  Lovenox  Discharge Planning:   Monse Evans, PAL 09/12/17 4:17 PM

## 2017-09-12 NOTE — THERAPY TREATMENT NOTE
Acute Care - Occupational Therapy Treatment Note   [facility]     Patient Name: Thurman Mendel Parsons  : 1950  MRN: [id number]  Today's Date: 2017  Onset of Illness/Injury or Date of Surgery Date: 17  Date of Referral to OT: 17  Referring Physician: Dr. Mendez      Admit Date: 2017    Visit Dx:     ICD-10-CM ICD-9-CM   1. Pneumonia of right lower lobe due to infectious organism J18.9 483.8   2. COPD exacerbation J44.1 491.21   3. Acute on chronic respiratory failure with hypercapnia J96.22 518.84   4. Impaired functional mobility and activity tolerance Z74.09 V49.89   5. Impaired mobility and ADLs Z74.09 799.89     Patient Active Problem List   Diagnosis   • Anxiety   • Atherosclerotic heart disease of native coronary artery without angina pectoris   • Atrial fibrillation   • Chronic bronchitis   • Chronic kidney disease, stage III (moderate)   • Steroid-dependent COPD   • Degeneration of cervical intervertebral disc   • Diabetes mellitus   • GERD without esophagitis   • Diverticulosis   • Hemorrhoids   • Hiatal hernia   • Hyperlipidemia   • Hypertension   • Kyphosis, acquired   • Mitral and aortic valve disease   • Phimosis   • Sleep apnea   • Enlarged prostate without lower urinary tract symptoms (luts)   • History of arthritis   • Back pain   • Cardiac pain   • Depression   • Hematuria   • Stroke syndrome   • History of ventricular septal defect   • Neck pain   • COPD exacerbation   • Pneumonia of right lower lobe due to infectious organism             Adult Rehabilitation Note       17 1024          Rehab Assessment/Intervention    Discipline occupational therapist  -SD      Document Type therapy note (daily note)  -SD      Subjective Information agree to therapy;complains of;pain  -SD      Patient Effort, Rehab Treatment good  -SD      Symptoms Noted During/After Treatment shortness of breath  -SD      Precautions/Limitations fall precautions;oxygen therapy device and L/min   -SD      Recorded by [SD] Kelle Edwards OT      Pain Assessment    Pain Assessment 0-10  -SD      Pain Score 6  -SD      Post Pain Score 6  -SD      Pain Type Acute pain  -SD      Pain Location Rib cage  -SD      Pain Intervention(s) Ambulation/increased activity;Repositioned  -SD      Response to Interventions Tolerated  -SD      Recorded by [SD] Kelle Edwards OT      Bed Mobility, Assessment/Treatment    Bed Mobility, Assistive Device bed rails;head of bed elevated  -SD      Bed Mob, Supine to Sit, [place] independent  -SD      Recorded by [SD] Kelle Edwards OT      Transfer Assessment/Treatment    Transfers, Sit-Stand [place] supervision required  -SD      Transfers, Stand-Sit [place] supervision required  -SD      Transfers, Sit-Stand-Sit, Assist Device rolling walker  -SD      Recorded by [SD] Kelle Edwards OT      Functional Mobility    Functional Mobility- Ind. Level contact guard assist  -SD      Functional Mobility- Device rolling walker  -SD      Functional Mobility-Distance (Feet) 170  -SD      Recorded by [SD] Kelle Edwards OT      Therapy Exercises    Bilateral Upper Extremity AROM:;15 reps;sitting;elbow flexion/extension;shoulder abduction/adduction;shoulder extension/flexion  -SD      BUE Resistance theraband  -SD      Recorded by [SD] Kelle Edwards OT      Positioning and Restraints    Pre-Treatment Position in bed  -SD      Post Treatment Position chair  -SD      In Chair reclined;call light within reach;encouraged to call for assist  -SD      Recorded by [SD] Kelle Edwards OT        User Key  (r) = Recorded By, (t) = Taken By, (c) = Cosigned By    Initials Name Effective Dates    SD Kelle Edwards OT 06/30/17 -                 OT Goals       09/12/17 1554 09/11/17 1331       Transfer Training OT LTG    Transfer Training OT LTG, Date Established  09/11/17  -     Transfer Training OT LTG, Time to Achieve  by discharge  -     Transfer Training OT LTG,  Activity Type  sit to stand/stand to sit  -     Transfer Training OT LTG, [place] Level  independent  -     Transfer Training OT LTG, Date Goal Reviewed 09/12/17  -SD      Transfer Training OT LTG, Outcome goal ongoing  -SD goal ongoing  -     Strength OT LTG    Strength Goal OT LTG, Date Established  09/11/17  -     Strength Goal OT LTG, Time to Achieve  by discharge  -     Strength Goal OT LTG, Functional Goal  Pt will participate in BUE strengthening ex 15 reps each using theraband for resistance.  -     Strength Goal OT LTG, Date Goal Reviewed 09/12/17  -SD      Strength Goal OT LTG, Outcome goal met  -SD goal ongoing  -     LB Dressing OT LTG    LB Dressing Goal OT LTG, Date Established  09/11/17  -     LB Dressing Goal OT LTG, Time to Achieve  by discharge  -     LB Dressing Goal OT LTG, [place] Level  set up required  -     LB Dressing Goal OT LTG, Date Goal Reviewed 09/12/17  -SD      LB Dressing Goal OT LTG, Outcome goal ongoing  -SD goal ongoing  -     Functional Mobility OT LTG    Functional Mobility Goal OT LTG, Date Established  09/11/17  -     Functional Mobility Goal OT LTG, Time to Achieve  by discharge  -     Functional Mobility Goal OT LTG, [place] Level  independent with device  -     Functional Mobility Goal OT LTG, Assist Device  rolling walker  -     Functional Mobility Goal OT LTG, Distance to Achieve  in hallway  -     Functional Mobility Goal OT LTG, Additional Goal  Pt will walk 350' with sba using RW and O2  -     Functional Mobility Goal OT LTG, Date Goal Reviewed 09/12/17  -SD      Functional Mobility Goal OT LTG, Outcome goal ongoing  -SD goal ongoing  -       User Key  (r) = Recorded By, (t) = Taken By, (c) = Cosigned By    Initials Name Provider Type    SOWMYA Gutierrez Occupational Therapist    TONY Edwards, OT Occupational Therapist          Occupational Therapy Education     Title: PT OT SLP Therapies (Active)     Topic:  Occupational Therapy (Active)     Point: ADL training (Done)    Description: Instruct learner(s) on proper safety adaptation and remediation techniques during self care or transfers.   Instruct in proper use of assistive devices.    Learning Progress Summary    Learner Readiness Method Response Comment Documented by Status   Patient Acceptance E VU Role of OT/POC  09/11/17 1331 Done    Acceptance E VU talked with pt about safety with mobility. Advised to get assist from nursing when walking secondary to lines and O2.  09/09/17 1431 Done    Acceptance E VU,NR LACE TEACHING  ROOM ORIENTATION  SMOKING CESSATION TN 09/09/17 0215 Done   Family Acceptance E VU,NR LACE TEACHING  ROOM ORIENTATION  SMOKING CESSATION TN 09/09/17 0215 Done               Point: Home exercise program (Done)    Description: Instruct learner(s) on appropriate technique for monitoring, assisting and/or progressing therapeutic exercises/activities.    Learning Progress Summary    Learner Readiness Method Response Comment Documented by Status   Patient Acceptance E VU talked with pt about safety with mobility. Advised to get assist from nursing when walking secondary to lines and O2.  09/09/17 1431 Done    Acceptance E VU,NR LACE TEACHING  ROOM ORIENTATION  SMOKING CESSATION TN 09/09/17 0215 Done   Family Acceptance E VU,NR LACE TEACHING  ROOM ORIENTATION  SMOKING CESSATION TN 09/09/17 0215 Done               Point: Precautions (Done)    Description: Instruct learner(s) on prescribed precautions during self-care and functional transfers.    Learning Progress Summary    Learner Readiness Method Response Comment Documented by Status   Patient Acceptance E VU talked with pt about safety with mobility. Advised to get assist from nursing when walking secondary to lines and O2.  09/09/17 1431 Done    Acceptance E VU,NR LACE TEACHING  ROOM ORIENTATION  SMOKING CESSATION TN 09/09/17 0215 Done   Family Acceptance E VU,NR LACE TEACHING  ROOM  ORIENTATION  SMOKING CESSATION TN 09/09/17 0215 Done                      User Key     Initials Effective Dates Name Provider Type Discipline    J 04/06/17 -  Sarkis Brennan PT Physical Therapist PT     10/26/16 -  Akilah Gutierrez Occupational Therapist OT    TN 10/26/16 -  Toshia Chang, RN Registered Nurse Nurse                  OT Recommendation and Plan  Anticipated Discharge Disposition: home  Planned Therapy Interventions: ADL retraining, strengthening, transfer training, home exercise program, energy conservation  Therapy Frequency: 3-5 times/wk  Plan of Care Review  Plan Of Care Reviewed With: patient  Progress: improving  Outcome Summary/Follow up Plan: OT tx completed. Refer to flow sheet. patient walked 164' with CGA and RW. C/O SOA. Continue OT POC        Outcome Measures       09/12/17 1024 09/11/17 1120       How much help from another is currently needed...    Putting on and taking off regular lower body clothing? 3  -SD 3  -AH     Bathing (including washing, rinsing, and drying) 3  -SD 3  -AH     Toileting (which includes using toilet bed pan or urinal) 3  -SD 3  -AH     Putting on and taking off regular upper body clothing 4  -SD 4  -AH     Taking care of personal grooming (such as brushing teeth) 4  -SD 4  -AH     Eating meals 4  -SD 4  -AH     Score 21  -SD 21  -AH     Functional Assessment    Outcome Measure Options AM-PAC 6 Clicks Daily Activity (OT)  -SD AM-PAC 6 Clicks Daily Activity (OT)  -AH       User Key  (r) = Recorded By, (t) = Taken By, (c) = Cosigned By    Initials Name Provider Type     Akilah Gutierrez Occupational Therapist    TONY Edwards, OT Occupational Therapist           Time Calculation:         Time Calculation- OT       09/12/17 1556          Time Calculation- OT    OT Start Time 1024  -SD      Total Timed Code Minutes- OT 26 minute(s)  -SD      OT Received On 09/12/17  -SD      OT Goal Re-Cert Due Date 09/21/17  -SD        User Key  (r) = Recorded By, (t) = Taken  By, (c) = Cosigned By    Initials Name Provider Type    SD Kelle Edwards, OT Occupational Therapist           Therapy Charges for Today     Code Description Service Date Service Provider Modifiers Qty    [id number]  OT THERAPEUTIC ACT EA 15 MIN 9/12/2017 Kelle Edwards, OT GO 1    [id number]  OT THER PROC EA 15 MIN 9/12/2017 Kelle Edwards OT GO 1               Kelle Edwards OT  9/12/2017

## 2017-09-12 NOTE — PROGRESS NOTES
Continued Stay Note  CARL[facility]     Patient Name: Thurman Mendel Parsons  MRN: [id number]  Today's Date: 9/12/2017    Admit Date: 9/8/2017          Discharge Plan       09/12/17 0940    Case Management/Social Work Plan    Additional Comments Spoke to pt regarding  DME he has [facility] confirmed he has order for 3 LNC  He does not recall having a CPAP or BIPAP He reports it may be in a Closet Called [facility]  They supplied a BIPAP in  2015  They  will need a  new order and may  be  able to obtin a new machimne if needed .Pt family to look for BIPAP and bring it to Hospital                Discharge Codes     None        Expected Discharge Date and Time     Expected Discharge Date Expected Discharge Time    Sep 13, 2017             Danielle Saldana

## 2017-09-13 LAB
ARTERIAL PATENCY WRIST A: POSITIVE
BACTERIA SPEC AEROBE CULT: NORMAL
BACTERIA SPEC AEROBE CULT: NORMAL
BASE EXCESS BLDA CALC-SCNC: 14 MMOL/L
BDY SITE: ABNORMAL
COHGB MFR BLD: 1.3 %
DEPRECATED RDW RBC AUTO: 40.2 FL (ref 37–54)
ERYTHROCYTE [DISTWIDTH] IN BLOOD BY AUTOMATED COUNT: 12 % (ref 11.5–14.5)
GLUCOSE BLDC GLUCOMTR-MCNC: 100 MG/DL (ref 70–130)
GLUCOSE BLDC GLUCOMTR-MCNC: 109 MG/DL (ref 70–130)
GLUCOSE BLDC GLUCOMTR-MCNC: 122 MG/DL (ref 70–130)
GLUCOSE BLDC GLUCOMTR-MCNC: 196 MG/DL (ref 70–130)
HCO3 BLDA-SCNC: 38.2 MMOL/L (ref 22–28)
HCT VFR BLD AUTO: 38.4 % (ref 42–52)
HGB BLD-MCNC: 12.1 G/DL (ref 14–18)
HGB BLDA-MCNC: 12.3 G/DL (ref 12–18)
HOROWITZ INDEX BLD+IHG-RTO: 40 %
MCH RBC QN AUTO: 28.9 PG (ref 27–31)
MCHC RBC AUTO-ENTMCNC: 31.5 G/DL (ref 30–37)
MCV RBC AUTO: 91.6 FL (ref 80–94)
METHGB BLD QL: 0.7 %
MODALITY: ABNORMAL
OXYHGB MFR BLDV: 89.1 % (ref 94–99)
PCO2 BLDA: 56.4 MM HG (ref 35–45)
PH BLDA: 7.44 PH UNITS
PLATELET # BLD AUTO: 215 10*3/MM3 (ref 130–400)
PMV BLD AUTO: 9.2 FL (ref 6–12)
PO2 BLDA: 55.8 MM HG (ref 75–100)
RBC # BLD AUTO: 4.19 10*6/MM3 (ref 4.7–6.1)
SAO2 % BLDCOA: 90.9 %
WBC NRBC COR # BLD: 14.45 10*3/MM3 (ref 4.8–10.8)

## 2017-09-13 PROCEDURE — 94799 UNLISTED PULMONARY SVC/PX: CPT

## 2017-09-13 PROCEDURE — 82805 BLOOD GASES W/O2 SATURATION: CPT

## 2017-09-13 PROCEDURE — 83050 HGB METHEMOGLOBIN QUAN: CPT

## 2017-09-13 PROCEDURE — 99232 SBSQ HOSP IP/OBS MODERATE 35: CPT | Performed by: NURSE PRACTITIONER

## 2017-09-13 PROCEDURE — 94667 MNPJ CHEST WALL 1ST: CPT

## 2017-09-13 PROCEDURE — 82962 GLUCOSE BLOOD TEST: CPT

## 2017-09-13 PROCEDURE — 82375 ASSAY CARBOXYHB QUANT: CPT

## 2017-09-13 PROCEDURE — 85027 COMPLETE CBC AUTOMATED: CPT | Performed by: NURSE PRACTITIONER

## 2017-09-13 PROCEDURE — 97110 THERAPEUTIC EXERCISES: CPT

## 2017-09-13 PROCEDURE — 97535 SELF CARE MNGMENT TRAINING: CPT

## 2017-09-13 PROCEDURE — 25010000002 ENOXAPARIN PER 10 MG: Performed by: INTERNAL MEDICINE

## 2017-09-13 PROCEDURE — 36600 WITHDRAWAL OF ARTERIAL BLOOD: CPT

## 2017-09-13 PROCEDURE — 63710000001 INSULIN ASPART PER 5 UNITS: Performed by: INTERNAL MEDICINE

## 2017-09-13 PROCEDURE — 25010000002 METHYLPREDNISOLONE PER 80 MG: Performed by: INTERNAL MEDICINE

## 2017-09-13 PROCEDURE — 97530 THERAPEUTIC ACTIVITIES: CPT

## 2017-09-13 PROCEDURE — 99223 1ST HOSP IP/OBS HIGH 75: CPT | Performed by: INTERNAL MEDICINE

## 2017-09-13 PROCEDURE — 25010000002 METHYLPREDNISOLONE PER 40 MG: Performed by: INTERNAL MEDICINE

## 2017-09-13 PROCEDURE — 25010000002 CEFTRIAXONE: Performed by: INTERNAL MEDICINE

## 2017-09-13 PROCEDURE — 94660 CPAP INITIATION&MGMT: CPT

## 2017-09-13 RX ORDER — BUDESONIDE 0.5 MG/2ML
0.5 INHALANT ORAL
Status: DISCONTINUED | OUTPATIENT
Start: 2017-09-13 | End: 2017-09-18 | Stop reason: HOSPADM

## 2017-09-13 RX ORDER — METHYLPREDNISOLONE SODIUM SUCCINATE 40 MG/ML
40 INJECTION, POWDER, LYOPHILIZED, FOR SOLUTION INTRAMUSCULAR; INTRAVENOUS EVERY 6 HOURS
Status: DISCONTINUED | OUTPATIENT
Start: 2017-09-13 | End: 2017-09-17

## 2017-09-13 RX ORDER — METHYLPREDNISOLONE ACETATE 80 MG/ML
80 INJECTION, SUSPENSION INTRA-ARTICULAR; INTRALESIONAL; INTRAMUSCULAR; SOFT TISSUE ONCE
Status: COMPLETED | OUTPATIENT
Start: 2017-09-13 | End: 2017-09-13

## 2017-09-13 RX ORDER — ARFORMOTEROL TARTRATE 15 UG/2ML
15 SOLUTION RESPIRATORY (INHALATION)
Status: DISCONTINUED | OUTPATIENT
Start: 2017-09-13 | End: 2017-09-18 | Stop reason: HOSPADM

## 2017-09-13 RX ADMIN — CARVEDILOL 6.25 MG: 6.25 TABLET, FILM COATED ORAL at 08:22

## 2017-09-13 RX ADMIN — CLOPIDOGREL BISULFATE 75 MG: 75 TABLET ORAL at 08:21

## 2017-09-13 RX ADMIN — LOSARTAN POTASSIUM 100 MG: 50 TABLET, FILM COATED ORAL at 08:22

## 2017-09-13 RX ADMIN — GUAIFENESIN 1200 MG: 600 TABLET, EXTENDED RELEASE ORAL at 17:04

## 2017-09-13 RX ADMIN — ATORVASTATIN CALCIUM 40 MG: 40 TABLET, FILM COATED ORAL at 08:21

## 2017-09-13 RX ADMIN — IPRATROPIUM BROMIDE AND ALBUTEROL SULFATE 3 ML: .5; 3 SOLUTION RESPIRATORY (INHALATION) at 19:15

## 2017-09-13 RX ADMIN — BUDESONIDE 0.5 MG: 0.5 INHALANT RESPIRATORY (INHALATION) at 21:15

## 2017-09-13 RX ADMIN — METHYLPREDNISOLONE SODIUM SUCCINATE 40 MG: 40 INJECTION, POWDER, FOR SOLUTION INTRAMUSCULAR; INTRAVENOUS at 02:32

## 2017-09-13 RX ADMIN — IPRATROPIUM BROMIDE AND ALBUTEROL SULFATE 3 ML: .5; 3 SOLUTION RESPIRATORY (INHALATION) at 01:13

## 2017-09-13 RX ADMIN — IPRATROPIUM BROMIDE AND ALBUTEROL SULFATE 3 ML: .5; 3 SOLUTION RESPIRATORY (INHALATION) at 13:22

## 2017-09-13 RX ADMIN — FINASTERIDE 5 MG: 5 TABLET, FILM COATED ORAL at 08:21

## 2017-09-13 RX ADMIN — ASPIRIN 81 MG: 81 TABLET, COATED ORAL at 08:22

## 2017-09-13 RX ADMIN — METHYLPREDNISOLONE SODIUM SUCCINATE 40 MG: 40 INJECTION, POWDER, FOR SOLUTION INTRAMUSCULAR; INTRAVENOUS at 10:55

## 2017-09-13 RX ADMIN — ARFORMOTEROL TARTRATE 15 MCG: 15 SOLUTION RESPIRATORY (INHALATION) at 21:15

## 2017-09-13 RX ADMIN — GUAIFENESIN 1200 MG: 600 TABLET, EXTENDED RELEASE ORAL at 08:21

## 2017-09-13 RX ADMIN — INSULIN ASPART 2 UNITS: 100 INJECTION, SOLUTION INTRAVENOUS; SUBCUTANEOUS at 17:04

## 2017-09-13 RX ADMIN — CARVEDILOL 6.25 MG: 6.25 TABLET, FILM COATED ORAL at 17:04

## 2017-09-13 RX ADMIN — SPIRONOLACTONE 12.5 MG: 25 TABLET, FILM COATED ORAL at 08:22

## 2017-09-13 RX ADMIN — IPRATROPIUM BROMIDE AND ALBUTEROL SULFATE 3 ML: .5; 3 SOLUTION RESPIRATORY (INHALATION) at 06:57

## 2017-09-13 RX ADMIN — ENOXAPARIN SODIUM 40 MG: 40 INJECTION SUBCUTANEOUS at 08:22

## 2017-09-13 RX ADMIN — ESCITALOPRAM OXALATE 10 MG: 10 TABLET ORAL at 22:00

## 2017-09-13 RX ADMIN — METHYLPREDNISOLONE SODIUM SUCCINATE 40 MG: 40 INJECTION, POWDER, FOR SOLUTION INTRAMUSCULAR; INTRAVENOUS at 17:04

## 2017-09-13 RX ADMIN — TAMSULOSIN HYDROCHLORIDE 0.4 MG: 0.4 CAPSULE ORAL at 22:00

## 2017-09-13 RX ADMIN — METHYLPREDNISOLONE ACETATE 80 MG: 80 INJECTION, SUSPENSION INTRA-ARTICULAR; INTRALESIONAL; INTRAMUSCULAR; SOFT TISSUE at 17:04

## 2017-09-13 RX ADMIN — CEFTRIAXONE 1 G: 1 INJECTION, POWDER, FOR SOLUTION INTRAMUSCULAR; INTRAVENOUS at 15:53

## 2017-09-13 RX ADMIN — METHYLPREDNISOLONE SODIUM SUCCINATE 40 MG: 40 INJECTION, POWDER, FOR SOLUTION INTRAMUSCULAR; INTRAVENOUS at 22:00

## 2017-09-13 RX ADMIN — AMLODIPINE BESYLATE 5 MG: 5 TABLET ORAL at 08:22

## 2017-09-13 NOTE — PLAN OF CARE
Problem: NPPV/CPAP (Adult)  Intervention: Monitor/Manage Anxiety Related to NPPV/CPAP    09/13/17 0115   Coping Strategies   Trust Relationship/Rapport choices provided;care explained;questions answered;questions encouraged       Intervention: Prevent Aspiration During Therapy    09/13/17 0115   Positioning   Head Of Bed (HOB) Position HOB elevated         Goal: Signs and Symptoms of Listed Potential Problems Will be Absent or Manageable (NPPV/CPAP)  Outcome: Ongoing (interventions implemented as appropriate)    09/13/17 0115   NPPV/CPAP   Problems Assessed (NPPV/CPAP) hypoxia/hypoxemia;situational response;dry mucous membranes;skin breakdown   Problems Present (NPPV/CPAP) none

## 2017-09-13 NOTE — CONSULTS
"Date of consultation:   September 13, 2017    Requested by:   Hospitalist Service.   PCP: Miguel Rojas MD    Reason:  Acute Hypercarbic Respiratory Failure. ? PNA ?    History of Present Illness:  67-year-old gentleman with past medical history significant for COPD and chronic hypoxemia who was brought in after he looked \"pale\".  The patient was told that he had altered mental status as well.  He came to the emergency room he was found to be in acute hypercarbic respiratory failure and was admitted to the hospitalist service and pulmonary consultation was requested.    The patient says that over the past 2-3 weeks he's had increasing cough and phlegm production without any fever.  He says especially for the past 2-4 days, he's had significant worsening in the cough and now the sputum seems to be yellowish.  He uses his oxygen regularly and although he was prescribed BiPAP by Dr. Lopez, he's never used it?    He denies any sick contacts    He is complaining of back pain as well as lower extremity swelling on occasion.  He also says that his exercise capacity is limited because of back pain.    Review of System: All other review of systems negative except indicated in HPI     Past Medical History:  Past Medical History:   Diagnosis Date   • Abnormal liver enzymes    • Acute bronchitis    • Anemia    • Anxiety    • Arthritis    • Atherosclerotic heart disease of native coronary artery without angina pectoris    • Atrial fibrillation    • Atypical chest pain    • Back pain    • BPH (benign prostatic hyperplasia)    • Cardiac pain    • Chronic bronchitis    • Chronic kidney disease    • COPD (chronic obstructive pulmonary disease)    • Degeneration of cervical intervertebral disc    • Depression    • Diabetes mellitus    • Diverticulosis    • Dyspnea    • Esophageal reflux    • Esophagitis    • Gastritis    • Hematuria    • Hemorrhoids    • Hiatal hernia    • History of arthritis    • History of myocardial infarction  " "  • History of peripheral neuropathy    • History of ventricular septal defect    • History of ventricular septal defect    • Hyperlipidemia    • Hypertension    • Infectious diarrhea    • Infectious diarrhea    • Kyphosis, acquired    • Lumbar radiculopathy    • Mitral and aortic valve disease    • Myocardial infarction    • Nephrolithiasis    • Phimosis    • Respiratory failure    • Sleep apnea    • Stroke syndrome    • Stroke syndrome    • Urinary calculus    • Urinary tract infection    • Ventral hernia          Past Surgical History:  Past Surgical History:   Procedure Laterality Date   • HERNIA REPAIR     • SUPRAPUBIC CATHETER INSERTION      History of Percutaneous Catheter Placement Into Ureter   • THROAT SURGERY     • VSD REPAIR      History of VSD Repair By Patch Closure         Family History:  Family History   Problem Relation Age of Onset   • Other Father      CABG   • Coronary artery disease Father          Social History:  Social History     Social History   • Marital status:      Spouse name: N/A   • Number of children: N/A   • Years of education: N/A     Occupational History   •  Retired     Social History Main Topics   • Smoking status: Former Smoker   • Smokeless tobacco: Never Used   • Alcohol use No   • Drug use: No   • Sexual activity: Defer     Other Topics Concern   • None     Social History Narrative         Physical Exam:  /77 (BP Location: Right arm, Patient Position: Sitting)  Pulse 94  Temp 97.8 °F (36.6 °C) (Axillary)   Resp 22  Ht 63\" (160 cm)  Wt 158 lb 4.8 oz (71.8 kg)  SpO2 95%  BMI 28.04 kg/m2    Constitutional:             General: Mild respiratory distress noted.    Head/Face/Eyes:             No significant facial abnormalities seen.             Extra ocular movement was intact.             Pupils appeared equal    ENT:             Hearing was intact              No nasal erythema noted.              Oropharynx was moist. No lesions noted.     Neck:            "  Supple. No JVD noted.              Thyroid gland did not seem to be enlarged    Cardiovascular:              S1 + S2. Regular.    Respiratory:            Respiratory effort was minimally labored.             Hyperresonance to percussion.            Decreased Air Entry Bilaterally with mild to moderate wheezing.    Abdomen:            Soft.  Bowel sounds were positive.  No obvious organomegaly.    Extremities:            No edema noted.            No cyanosis noted            No clubbing noted            Gait could not be assessed at this time.    Neurologic/Psychiatric:             Affect appeared fair.             Awake, alert and oriented x 3.             Able to follow simple commands.    Skin:             No obvious rash noted.             Warm and dry          Labs:   Reviewed. Pertinent labs were noted.     Lab Results   Component Value Date    WBC 14.45 (H) 09/13/2017    HGB 12.1 (L) 09/13/2017    HCT 38.4 (L) 09/13/2017    MCV 91.6 09/13/2017     09/13/2017       Lab Results   Component Value Date    GLUCOSE 129 (H) 09/12/2017    CALCIUM 8.8 09/12/2017     09/12/2017    K 4.4 09/12/2017    CO2 35.0 (H) 09/12/2017     09/12/2017    BUN 29 (H) 09/12/2017    CREATININE 0.80 09/12/2017    EGFRIFNONA 96 09/12/2017    BCR 36.3 (H) 09/12/2017    ANIONGAP 9.4 09/12/2017         ABG:  Lab Results   Component Value Date    PHART 7.439 09/13/2017    VDN2HZN 56.4 (H) 09/13/2017    PO2ART 55.8 (L) 09/13/2017    SO2 94.1 08/10/2016    FCOHB 4.5 08/10/2016    HGBBG 12.3 09/13/2017    S6SZUIWZ 90.9 09/13/2017    CARBOXYHGB 1.3 09/13/2017    FMETHB 0.3 08/10/2016         Imaging Study: Images reviewed personally and discussed with patient     Imaging Results (last 72 hours)     Procedure Component Value Units Date/Time    XR Chest PA & Lateral [[id number]] Collected:  09/11/17 1625     Updated:  09/11/17 1631    Narrative:       PROCEDURE: XR CHEST PA AND LATERAL-        HISTORY: pneumonia;  J18.9-Pneumonia, unspecified organism; J44.1-Chronic  obstructive pulmonary disease with (acute) exacerbation; J96.22-Acute  and chronic respiratory failure with hypercapnia; Z74.09-Other reduced  mobility     COMPARISON: September 8, 2017.     FINDINGS: The heart is normal in size. A PFO closure device is in place.  The mediastinum is unremarkable. There are emphysematous changes to the  lungs. There are small bilateral pleural effusions and bibasilar  opacities which may reflect atelectasis or pneumonia. There is no  pneumothorax. There are no acute osseous abnormalities.           Impression:       Small bilateral pleural effusions and bibasilar opacities  which may reflect atelectasis or pneumonia.           This report was finalized on 9/11/2017 4:29 PM by Richelle Patel M.D..            ECHO:  9/8/2017  · Left ventricular wall thickness is consistent with mild concentric hypertrophy.  · Left ventricular systolic function is mildly decreased. Estimated EF = 42%.  · Left ventricular diastolic dysfunction (grade I a) consistent with impaired relaxation.  · Right ventricular cavity is borderline dilated.  · Left atrial cavity size is moderate-to-severely dilated.  · Mild to moderate aortic valve regurgitation is present.  · Mild mitral valve regurgitation is present  · Mild tricuspid valve regurgitation is present.  · Calculated right ventricular systolic pressure from tricuspid regurgitation is 29 mmHg.  · Mild pulmonic valve regurgitation is present.      Assessment:  1.  Acute hypercarbic respiratory failure    2.  Acute exacerbation of COPD    3.  Likely acute bronchitis    4. ?  Hemoptysis    5.?  Pneumonia    6.  Hypoxemia    7.  Smoking.     Discussion/Recommendations:     Will change IV Steroids.    CXRay in AM.    IS + Chest PT.    Continue BiPAP.    Will need to use BiPAP at home.    The plan was discussed with the patient.  I have also discussed the case with the nursing staff.    Recommendations  were also discussed with the referring provider.     I would like to thank you for the opportunity to participate in the care of this patient.  We will communicate changes and recommendations, if and when necessary.      Slim Toribio MD  09/13/17  4:10 PM    Dictated utilizing Dragon dictation.

## 2017-09-13 NOTE — PLAN OF CARE
Problem: Patient Care Overview (Adult)  Goal: Plan of Care Review  Outcome: Ongoing (interventions implemented as appropriate)    09/13/17 1251   Coping/Psychosocial Response Interventions   Plan Of Care Reviewed With patient   Patient Care Overview   Progress improving   Outcome Evaluation   Outcome Summary/Follow up Plan Patient participated in OT interventions directed towards increasing strength, functional activity tolerance and ADL independence. Functional mobility x 100 feet via r/w. BUE ex x 10 reps with red band. Transfers and LBD. SOB with frequent rest breaks required.         Problem: Inpatient Occupational Therapy  Goal: Transfer Training Goal 1 LTG- OT  Outcome: Ongoing (interventions implemented as appropriate)    09/11/17 1331 09/13/17 1251   Transfer Training OT LTG   Transfer Training OT LTG, Date Established 09/11/17 --    Transfer Training OT LTG, Time to Achieve by discharge --    Transfer Training OT LTG, Activity Type sit to stand/stand to sit --    Transfer Training OT LTG, [place] Level independent --    Transfer Training OT LTG, Date Goal Reviewed --  09/13/17   Transfer Training OT LTG, Outcome --  goal ongoing       Goal: LB Dressing Goal LTG- OT  Outcome: Ongoing (interventions implemented as appropriate)    09/13/17 1251   LB Dressing OT LTG   LB Dressing Goal OT LTG, Date Goal Reviewed 09/13/17   LB Dressing Goal OT LTG, Outcome goal ongoing       Goal: Functional Mobility Goal LTG- OT  Outcome: Ongoing (interventions implemented as appropriate)    09/11/17 1331 09/13/17 1251   Functional Mobility OT LTG   Functional Mobility Goal OT LTG, Date Established 09/11/17 --    Functional Mobility Goal OT LTG, Time to Achieve by discharge --    Functional Mobility Goal OT LTG, [place] Level independent with device --    Functional Mobility Goal OT LTG, Assist Device rolling walker --    Functional Mobility Goal OT LTG, Distance to Achieve in hallway --    Functional Mobility Goal OT  LTG, Additional Goal Pt will walk 350' with sba using RW and O2 --    Functional Mobility Goal OT LTG, Date Goal Reviewed --  09/13/17   Functional Mobility Goal OT LTG, Outcome --  goal ongoing

## 2017-09-13 NOTE — PLAN OF CARE
Problem: Patient Care Overview (Adult)  Goal: Plan of Care Review  Outcome: Ongoing (interventions implemented as appropriate)    09/13/17 0602   Coping/Psychosocial Response Interventions   Plan Of Care Reviewed With patient   Patient Care Overview   Progress no change       Goal: Adult Individualization and Mutuality  Outcome: Ongoing (interventions implemented as appropriate)  Goal: Discharge Needs Assessment  Outcome: Ongoing (interventions implemented as appropriate)    Problem: Pain, Acute (Adult)  Goal: Acceptable Pain Control/Comfort Level  Outcome: Ongoing (interventions implemented as appropriate)    09/13/17 0602   Pain, Acute (Adult)   Acceptable Pain Control/Comfort Level making progress toward outcome         Problem: Older Inpatient, Acutely Ill (Adult)  Goal: Signs and Symptoms of Listed Potential Problems Will be Absent or Manageable (Older Inpatient, Acutely Ill)  Outcome: Ongoing (interventions implemented as appropriate)    09/13/17 0602   Older Adult Inpatient, Acutely Ill   Problems Assessed (Acutely Ill Older Adult) all   Problems Present (Acutely Ill Older Adult) functional decline/self-care deficit         Problem: Pneumonia (Adult)  Goal: Signs and Symptoms of Listed Potential Problems Will be Absent or Manageable (Pneumonia)  Outcome: Ongoing (interventions implemented as appropriate)    09/13/17 0602   Pneumonia   Problems Assessed (Pneumonia) all   Problems Present (Pneumonia) progression of infection

## 2017-09-13 NOTE — THERAPY TREATMENT NOTE
Acute Care - Occupational Therapy Treatment Note   [facility]     Patient Name: Thurman Mendel Parsons  : 1950  MRN: [id number]  Today's Date: 2017  Onset of Illness/Injury or Date of Surgery Date: 17  Date of Referral to OT: 17  Referring Physician: Dr. Mendez      Admit Date: 2017    Visit Dx:     ICD-10-CM ICD-9-CM   1. Pneumonia of right lower lobe due to infectious organism J18.9 483.8   2. COPD exacerbation J44.1 491.21   3. Acute on chronic respiratory failure with hypercapnia J96.22 518.84   4. Impaired functional mobility and activity tolerance Z74.09 V49.89   5. Impaired mobility and ADLs Z74.09 799.89     Patient Active Problem List   Diagnosis   • Anxiety   • Atherosclerotic heart disease of native coronary artery without angina pectoris   • Atrial fibrillation   • Chronic bronchitis   • Chronic kidney disease, stage III (moderate)   • Steroid-dependent COPD   • Degeneration of cervical intervertebral disc   • Diabetes mellitus   • GERD without esophagitis   • Diverticulosis   • Hemorrhoids   • Hiatal hernia   • Hyperlipidemia   • Hypertension   • Kyphosis, acquired   • Mitral and aortic valve disease   • Phimosis   • Sleep apnea   • Enlarged prostate without lower urinary tract symptoms (luts)   • History of arthritis   • Back pain   • Cardiac pain   • Depression   • Hematuria   • Stroke syndrome   • History of ventricular septal defect   • Neck pain   • COPD exacerbation   • Pneumonia of right lower lobe due to infectious organism             Adult Rehabilitation Note       17 0935 17 1024       Rehab Assessment/Intervention    Discipline occupational therapist  -HE occupational therapist  -SD     Document Type therapy note (daily note)  -HE therapy note (daily note)  -SD     Subjective Information complains of;dyspnea  -HE agree to therapy;complains of;pain  -SD     Patient Effort, Rehab Treatment good  -HE good  -SD     Symptoms Noted During/After Treatment  shortness of breath  -HE shortness of breath  -SD     Precautions/Limitations fall precautions;oxygen therapy device and L/min  -HE fall precautions;oxygen therapy device and L/min  -SD     Patient Response to Treatment --   Tolerated well  -HE      Recorded by [HE] Farhan Ontiveros [SD] Kelle Edwards OT     Vital Signs    Pre SpO2 (%) 95  -HE      O2 Delivery Pre Treatment supplemental O2   [id number]  -HE      Intra SpO2 (%) --   95  -HE      O2 Delivery Intra Treatment supplemental O2  -HE      Post SpO2 (%) --   95  -HE      O2 Delivery Post Treatment supplemental O2  -HE      Intra Patient Position Supine  -HE      Recorded by [HE] Farhan Ontiveros      Pain Assessment    Pain Assessment No/denies pain  -HE 0-10  -SD     Pain Score  6  -SD     Post Pain Score 0  -HE 6  -SD     Pain Type  Acute pain  -SD     Pain Location  Rib cage  -SD     Pain Intervention(s)  Ambulation/increased activity;Repositioned  -SD     Response to Interventions  Tolerated  -SD     Recorded by [HE] Farhan Ontiveros [SD] Kelle Edwards OT     ROM (Range of Motion)    General ROM no range of motion deficits identified  -HE      Recorded by [HE] Farhan Ontiveros      Bed Mobility, Assessment/Treatment    Bed Mobility, Assistive Device bed rails;head of bed elevated  - bed rails;head of bed elevated  -SD     Bed Mob, Supine to Sit, [place] independent  -HE independent  -SD     Recorded by [HE] Farhan Ontiveros [SD] Kelle Edwards OT     Transfer Assessment/Treatment    Transfers, Sit-Stand [place] contact guard assist;verbal cues required  - supervision required  -SD     Transfers, Stand-Sit [place] verbal cues required  -HE supervision required  -SD     Transfers, Sit-Stand-Sit, Assist Device rolling walker  -HE rolling walker  -SD     Recorded by [HE] Farhan Ontiveros [SD] Kelle Edwards OT     Functional Mobility    Functional Mobility- Ind. Level verbal cues required;contact guard assist  - contact guard assist  -SD      Functional Mobility- Device rolling walker  -HE rolling walker  -SD     Functional Mobility-Distance (Feet) 100  -  -SD     Functional Mobility- Safety Issues supplemental O2  -HE      Recorded by [HE] Farhan Ontiveros [SD] Kelle Edwards OT     Lower Body Dressing Assessment/Training    LB Dressing Assess/Train, [place] verbal cues required;contact guard assist  -HE      Recorded by [HE] Farhan Ontiveros      Therapy Exercises    Bilateral Upper Extremity  AROM:;15 reps;sitting;elbow flexion/extension;shoulder abduction/adduction;shoulder extension/flexion  -SD     BUE Resistance manual resistance- moderate   10 reps  -HE theraband  -SD     Recorded by [HE] Farhan Ontiveros [SD] Kelle Edwards OT     Positioning and Restraints    Pre-Treatment Position in bed  -HE in bed  -SD     Post Treatment Position  chair  -SD     In Chair reclined;call light within reach  -HE reclined;call light within reach;encouraged to call for assist  -SD     Recorded by [HE] Farhan Ontiveros [SD] Kelle Edwards OT       User Key  (r) = Recorded By, (t) = Taken By, (c) = Cosigned By    Initials Name Effective Dates    SAMIA Ontiveros 06/26/17 -     SD Kelle Edwards OT 06/30/17 -                 OT Goals       09/13/17 1251 09/12/17 1554 09/11/17 1331    Transfer Training OT LTG    Transfer Training OT LTG, Date Established   09/11/17  -    Transfer Training OT LTG, Time to Achieve   by discharge  -    Transfer Training OT LTG, Activity Type   sit to stand/stand to sit  -    Transfer Training OT LTG, [place] Level   independent  -    Transfer Training OT LTG, Date Goal Reviewed 09/13/17  -HE 09/12/17  -SD     Transfer Training OT LTG, Outcome goal ongoing  -HE goal ongoing  -SD goal ongoing  -    Strength OT LTG    Strength Goal OT LTG, Date Established   09/11/17  -    Strength Goal OT LTG, Time to Achieve   by discharge  -    Strength Goal OT LTG, Functional Goal   Pt will participate in BUE  strengthening ex 15 reps each using theraband for resistance.  -    Strength Goal OT LTG, Date Goal Reviewed  09/12/17  -SD     Strength Goal OT LTG, Outcome  goal met  -SD goal ongoing  -    LB Dressing OT LTG    LB Dressing Goal OT LTG, Date Established   09/11/17  -    LB Dressing Goal OT LTG, Time to Achieve   by discharge  -    LB Dressing Goal OT LTG, [place] Level   set up required  -    LB Dressing Goal OT LTG, Date Goal Reviewed 09/13/17  -HE 09/12/17  -SD     LB Dressing Goal OT LTG, Outcome goal ongoing  -HE goal ongoing  -SD goal ongoing  -    Functional Mobility OT LTG    Functional Mobility Goal OT LTG, Date Established   09/11/17  -    Functional Mobility Goal OT LTG, Time to Achieve   by discharge  -    Functional Mobility Goal OT LTG, [place] Level   independent with device  -    Functional Mobility Goal OT LTG, Assist Device   rolling walker  -    Functional Mobility Goal OT LTG, Distance to Achieve   in hallway  -    Functional Mobility Goal OT LTG, Additional Goal   Pt will walk 350' with sba using RW and O2  -    Functional Mobility Goal OT LTG, Date Goal Reviewed 09/13/17  -HE 09/12/17  -SD     Functional Mobility Goal OT LTG, Outcome goal ongoing  -HE goal ongoing  -SD goal ongoing  -      User Key  (r) = Recorded By, (t) = Taken By, (c) = Cosigned By    Initials Name Provider Type     Akilah Gutierrez Occupational Therapist    SAMIA Ontiveros Occupational Therapist    TONY Edwards, OT Occupational Therapist          Occupational Therapy Education     Title: PT OT SLP Therapies (Active)     Topic: Occupational Therapy (Active)     Point: ADL training (Done)    Description: Instruct learner(s) on proper safety adaptation and remediation techniques during self care or transfers.   Instruct in proper use of assistive devices.    Learning Progress Summary    Learner Readiness Method Response Comment Documented by Status   Patient Acceptance E VU Role of  OT/POC  09/11/17 1331 Done    Acceptance E VU talked with pt about safety with mobility. Advised to get assist from nursing when walking secondary to lines and O2.  09/09/17 1431 Done    Acceptance E VU,NR LACE TEACHING  ROOM ORIENTATION  SMOKING CESSATION TN 09/09/17 0215 Done   Family Acceptance E VU,NR LACE TEACHING  ROOM ORIENTATION  SMOKING CESSATION TN 09/09/17 0215 Done               Point: Home exercise program (Done)    Description: Instruct learner(s) on appropriate technique for monitoring, assisting and/or progressing therapeutic exercises/activities.    Learning Progress Summary    Learner Readiness Method Response Comment Documented by Status   Patient Acceptance E VU talked with pt about safety with mobility. Advised to get assist from nursing when walking secondary to lines and O2.  09/09/17 1431 Done    Acceptance E VU,NR LACE TEACHING  ROOM ORIENTATION  SMOKING CESSATION TN 09/09/17 0215 Done   Family Acceptance E VU,NR LACE TEACHING  ROOM ORIENTATION  SMOKING CESSATION TN 09/09/17 0215 Done               Point: Precautions (Done)    Description: Instruct learner(s) on prescribed precautions during self-care and functional transfers.    Learning Progress Summary    Learner Readiness Method Response Comment Documented by Status   Patient Acceptance E VU OT instructed on PLB and proper body mechanics to decrease SOB and prevent unsafe fatigue. Further instruction required. HE 09/13/17 1300 Done    Acceptance E VU talked with pt about safety with mobility. Advised to get assist from nursing when walking secondary to lines and O2.  09/09/17 1431 Done    Acceptance E VU,NR LACE TEACHING  ROOM ORIENTATION  SMOKING CESSATION TN 09/09/17 0215 Done   Family Acceptance E VU,NR LACE TEACHING  ROOM ORIENTATION  SMOKING CESSATION TN 09/09/17 0215 Done                      User Key     Initials Effective Dates Name Provider Type Discipline     04/06/17 -  Sarkis Brennan, PT Physical Therapist PT      10/26/16 -  Akilah Gutierrez Occupational Therapist OT    TN 10/26/16 -  Toshia Chang, RN Registered Nurse Nurse     06/26/17 -  Farhan Ontiveros Occupational Therapist OT                  OT Recommendation and Plan  Anticipated Discharge Disposition: home  Planned Therapy Interventions: ADL retraining, strengthening, transfer training, home exercise program, energy conservation  Therapy Frequency: 3-5 times/wk  Plan of Care Review  Plan Of Care Reviewed With: patient  Progress: improving  Outcome Summary/Follow up Plan: Patient participated in OT interventions directed towards increasing strength, functional activity tolerance and ADL independence. Functional mobility x 100 feet via r/w. BUE ex x 10 reps with red  band.  Transfers and LBD. SOB with frequent rest breaks required.        Outcome Measures       09/13/17 0935 09/12/17 1024 09/11/17 1120    How much help from another is currently needed...    Putting on and taking off regular lower body clothing? 3  -HE 3  -SD 3  -AH    Bathing (including washing, rinsing, and drying) 3  -HE 3  -SD 3  -AH    Toileting (which includes using toilet bed pan or urinal) 3  -HE 3  -SD 3  -AH    Putting on and taking off regular upper body clothing 4  -HE 4  -SD 4  -AH    Taking care of personal grooming (such as brushing teeth) 4  -HE 4  -SD 4  -AH    Eating meals 4  -HE 4  -SD 4  -AH    Score 21  -HE 21  -SD 21  -AH    Functional Assessment    Outcome Measure Options AM-PAC 6 Clicks Daily Activity (OT)  -HE AM-PAC 6 Clicks Daily Activity (OT)  -SD AM-PAC 6 Clicks Daily Activity (OT)  -AH      User Key  (r) = Recorded By, (t) = Taken By, (c) = Cosigned By    Initials Name Provider Type     Akilah Gutierrez Occupational Therapist     Farhan Ontiveros Occupational Therapist    TONY Edwards, OT Occupational Therapist           Time Calculation:         Time Calculation- OT       09/13/17 1303          Time Calculation- OT    OT Start Time 0935  -      Total Timed Code  Minutes- OT 45 minute(s)  -HE      OT Received On 09/13/17  -HE      OT Goal Re-Cert Due Date 09/21/17  -HE        User Key  (r) = Recorded By, (t) = Taken By, (c) = Cosigned By    Initials Name Provider Type    SAMIA Ontiveros Occupational Therapist           Therapy Charges for Today     Code Description Service Date Service Provider Modifiers Qty    64823297289 HC OT SELF CARE/MGMT/TRAIN EA 15 MIN 9/13/2017 Farhan Ontiveros GO 1    55392030304 HC OT THERAPEUTIC ACT EA 15 MIN 9/13/2017 Farhan Ontiveros GO 1    97645265763 HC OT THER PROC EA 15 MIN 9/13/2017 Farhan Ontiveros GO 1               Farhan Ontiveros  9/13/2017

## 2017-09-13 NOTE — PROGRESS NOTES
PROGRESS NOTE        Date of Admission: 9/8/2017  Length of Stay: 5  Primary Care Physician: Miguel Rojas MD    Subjective   Chief Complaint: Dyspnea, cough  HPI:  Patient was seen today.  He is sitting up to chair at bedside.  He appears to be more short of breath today using pursed lip breathing.  He has also had 3-4 episodes of hemoptysis.  I have consulted pulmonology.  I have spoken with Dr. Toribio who will evaluate the patient.  Repeat chest x-ray showed possible bibasilar opacities.  He is also agreed for rehabilitation placement upon discharge.  PT/OT consulted.  He denies any chest pain, he does complain of some cough with hemoptysis.  The amount of hemoptysis is rather small.  It is bright red.  He is on 5 L oxygen nasal cannula for which this is what he is on at home.  His hypercapnia has improved with the BiPAP usage during this admission.  He did bring in his home BiPAP machine for which it is never been opened.      Hospital Course:  This is a 67-year-old male who was admitted with a right basilar pneumonia and COPD exacerbation.  Patient has a history of COPD.  He was treated as an outpatient with doxycycline and steroids but did not really show any improvement.  When he came into the emergency room he was noted have oxygen saturations in the 80s.  He was placed on a nonrebreather pelvis his blood gas showed a PCO2 97 to he was switched to BiPAP.  After this he did start showing improvement.  He has been getting IV antibiotics in the form Rocephin and azithromycin as well as breathing treatments mucolytics and steroids.  He is still complaining of cough this morning which is productive with very thick brownish colored sputum.  He is getting mucolytics.  Overall he may be feeling somewhat better.  He was seen and evaluated by cardiology for an elevated troponin.  It is felt to be a type to myocardial injury secondary to supply demand mismatch.  Cardiology does not recommend any treatment for a  non-ST elevated myocardial infarction.           Review Of Systems:   Review of Systems   General ROS: Patient denies any fevers, chills or loss of consciousness.    Psychological ROS: Denies any hallucinations and delusions.  Ophthalmic ROS: Denies any diplopia or transient loss of vision.  ENT ROS: Denies sore throat, nasal congestion or ear pain.   Allergy and Immunology ROS: Denies rash or itching.  Hematological and Lymphatic ROS: Denies neck swelling or easy bleeding.  Endocrine ROS: Denies any recent unintentional weight gain or loss.  Breast ROS: Denies any pain or swelling.  Respiratory ROS: cough productive with some hemoptysis, SOA  Cardiovascular ROS: Denies chest pain or palpitations. No history of exertional chest pain.   Gastrointestinal ROS: Denies nausea and vomiting. Denies any abdominal pain. No diarrhea.  Genito-Urinary ROS: Denies dysuria or hematuria.  Musculoskeletal ROS: Denies back pain. No muscle pain. No calf pain.   Neurological ROS: Denies any focal weakness. No loss of consciousness. Denies any numbness. Denies neck pain.   Dermatological ROS: Denies any redness or pruritis.    Objective      Temp:  [96.9 °F (36.1 °C)-98.2 °F (36.8 °C)] 97.8 °F (36.6 °C)  Heart Rate:  [71-99] 94  Resp:  [14-22] 22  BP: (133-173)/(74-91) 133/77  Physical Exam    General Appearance:  Alert and cooperative, not in any acute distress.   Head:  Atraumatic and normocephalic, without obvious abnormality.   Eyes:          PERRLA, conjunctivae and sclerae normal, no Icterus. No pallor. Extra-occular movements are within normal limits.   Ears:  Ears appear intact with no abnormalities noted.   Throat: No oral lesions, no thrush, oral mucosa moist.   Neck: Supple, trachea midline, no thyromegaly, no carotid bruit.   Back:   No kyphoscoliosis present. No tenderness to palpation,   range of motion normal.   Lungs:   Chest shape is normal. Breath sounds heard bilaterally equally.  Crackles bilaterally.  Pursed lip  breathing.  No Pleural rub or bronchial breathing.   Heart:  Normal S1 and S2, no murmur, no gallop, no rub. No JVD   Abdomen:   Normal bowel sounds, no masses, no organomegaly. Soft     non-tender, non-distended, no guarding, no rebound                tenderness   Extremities: Moves all extremities well, no edema, no cyanosis, no clubbing.   Pulses: Pulses palpable and equal bilaterally   Skin: No bleeding, bruising or rash   Lymph nodes: No palpable adenopathy   Neurologic:    Psychiatric/Behavior:     Cranial nerves 2 - 12 grossly intact, sensation intact, Motor power is normal and equal bilaterally.  Mood normal, behavior normal       Results Review:    I have reviewed the labs, radiology results and diagnostic studies.      Results from last 7 days  Lab Units 09/12/17  0630   WBC 10*3/mm3 12.73*   HEMOGLOBIN g/dL 11.3*   PLATELETS 10*3/mm3 197       Results from last 7 days  Lab Units 09/12/17  0630   SODIUM mmol/L 140   POTASSIUM mmol/L 4.4   CO2 mmol/L 35.0*   CREATININE mg/dL 0.80   GLUCOSE mg/dL 129*       Culture Data:   Blood Culture   Date Value Ref Range Status   09/08/2017 No growth at 2 days  Preliminary   09/08/2017 No growth at 3 days  Preliminary     Respiratory Culture   Date Value Ref Range Status   09/11/2017 Rejected  Final     Radiology Data:   Cardiology Data:    I have reviewed the medications.    Assessment/Plan     Assessment and Plan:  1.  bibasilar pneumonia present upon admission-patient's IV antibiotics in the form Rocephin and azithromycin breathing treatments mucolytics and IV steroids.  Respiratory culture was rejected.  I have ordered a repeat sputum.    Blood cultures have not shown any growth at this time.    2.  Acute on chronic hypoxichypercapnic respiratory failure- patient has been on BiPAP.  He does have a BiPAP at home for which we've instructed the family to bring him.  According to the patient's difficult for her needs his BiPAP machine at home since he lives alone.  3.   COPD with exacerbation-same treatment is #1    4.  Diabetes mellitus type 2-patient on insulin protocol.  Blood sugars are stable.  Continue to monitor and titrate insulin accordingly    5.  Type II MI, secondary to supply demand mismatch-  radiology has seen and evaluated the patient does not feel this is a non-ST elevated myocardial infarction.  And will not treat as such.  He feels it is secondary to supply demand mismatch.    6.  Chronic essential hypertension-blood pressure stable continue to monitor.    7.  Chronic kidney disease CKD3-no function is at baseline if not better.    8.  Coronary artery disease-medical management    9.  Obstructive sleep apnea patient has a CPAP machine at home however due to his hypercapnia he will need to be switched to BiPAP.    10. Generalized weakness- PT OT has been consulted.  He did have a discussion with the patient regarding rehabilitation placement and he is in agreement.    11.  Hemoptysis-  Will check CBC.  It does appear to be a small amount.  Will closely monitor and home Lovenox and plavix when indicated.         DVT prophylaxis:  Lovenox  Discharge Planning:   PAL tSern 09/13/17 1:37 PM

## 2017-09-13 NOTE — PROGRESS NOTES
Continued Stay Note  CARLOS Shane     Patient Name: Thurman Mendel Parsons  MRN: [id number]  Today's Date: 9/13/2017    Admit Date: 9/8/2017          Discharge Plan       09/13/17 0938    Case Management/Social Work Plan    Plan Short Term Rehab     Additional Comments Spoke to pt he is agreement to shortterm Rehab and would prefer [facility] and  Rehab Faxed referral to them Pt  brought in his home BIPAP unit it is never been used since obtained in 2014  Called [facility]  they will send RT here tomorrow to start  teaching pt reports  he didnt know how to use it               Discharge Codes     None        Expected Discharge Date and Time     Expected Discharge Date Expected Discharge Time    Sep 13, 2017             Danielle Saldana

## 2017-09-13 NOTE — DISCHARGE PLACEMENT REQUEST
"Parsons, Thurman Mendel (67 y.o. Male)     Date of Birth Social Security Number Address Home Phone MRN    [id number]  [id number] [facility] [phone] [id number]    Orthodox Marital Status          None        Admission Date Admission Type Admitting Provider Attending Provider Department, Room/Bed    9/8/17 Emergency Joshua Mendez DO Gaspar, Daniel F., MD [facility] MED SURG  3, 325/1    Discharge Date Discharge Disposition Discharge Destination                      Attending Provider: Edward Davis MD     Allergies:  Sulfa Antibiotics    Isolation:  None   Infection:  None   Code Status:  FULL    Ht:  63\" (160 cm)   Wt:  158 lb 4.8 oz (71.8 kg)    Admission Cmt:  None   Principal Problem:  None                Active Insurance as of 9/8/2017     Primary Coverage     Payor Plan Insurance Group Employer/Plan Group    [facility] MEDICARE REPLACEMENT [facility] [id number]     Payor Plan Address Payor Plan Phone Number Effective From Effective To    [address] [address]  1/1/2016     [place] [address]       Subscriber Name Subscriber Birth Date Member ID       PARSONS,THURMAN MENDEL [id number]                 Emergency Contacts      (Rel.) Home Phone Work Phone Mobile Phone    Edward Easton -- -- [phone]    Jean Marie Pena -- -- [phone]    April Easton -- -- [phone]    Minda Pena (Mother) [phone] -- --            Hospital Medications (active)       Dose Frequency Start End    acetaminophen (TYLENOL) tablet 650 mg 650 mg Every 4 Hours PRN 9/8/2017     Sig - Route: Take 2 tablets by mouth Every 4 (Four) Hours As Needed for Mild Pain . - Oral    ALPRAZolam (XANAX) tablet 0.5 mg 0.5 mg Nightly PRN 9/8/2017     Sig - Route: Take 1 tablet by mouth At Night As Needed for Anxiety. - Oral    amLODIPine (NORVASC) tablet 5 mg 5 mg Every 24 Hours Scheduled 9/9/2017     Sig - Route: Take 1 tablet by mouth Daily. - Oral "    aspirin EC tablet 81 mg 81 mg Daily 9/9/2017     Sig - Route: Take 1 tablet by mouth Daily. - Oral    atorvastatin (LIPITOR) tablet 40 mg 40 mg Daily 9/9/2017     Sig - Route: Take 1 tablet by mouth Daily. - Oral    AZITHROMYCIN 500 MG/250 ML 0.9% NS IVPB (vial-mate) 500 mg Every 24 Hours 9/9/2017     Sig - Route: Infuse 500 mg into a venous catheter Daily. - Intravenous    benzonatate (TESSALON) capsule 200 mg 200 mg 3 Times Daily PRN 9/10/2017     Sig - Route: Take 2 capsules by mouth 3 (Three) Times a Day As Needed for Cough. - Oral    carvedilol (COREG) tablet 6.25 mg 6.25 mg 2 Times Daily With Meals 9/9/2017     Sig - Route: Take 1 tablet by mouth 2 (Two) Times a Day With Meals. - Oral    cefTRIAXone (ROCEPHIN) 1 g/100 mL 0.9% NS VTB (mbp) 1 g Every 24 Hours 9/9/2017     Sig - Route: Infuse 100 mL into a venous catheter Daily. - Intravenous    clopidogrel (PLAVIX) tablet 75 mg 75 mg Daily 9/9/2017     Sig - Route: Take 1 tablet by mouth Daily. - Oral    dextrose (D50W) solution 25 g 25 g Every 15 Minutes PRN 9/8/2017     Sig - Route: Infuse 50 mL into a venous catheter Every 15 (Fifteen) Minutes As Needed for Low Blood Sugar (Blood Sugar Less Than 70, Patient Has IV Access - Unresponsive, NPO or Unable To Safely Swallow). - Intravenous    dextrose (GLUTOSE) oral gel 1 tube 1 tube Every 15 Minutes PRN 9/8/2017     Sig - Route: Take 1 tube by mouth Every 15 (Fifteen) Minutes As Needed for Low Blood Sugar (Blood Sugar Less Than 70, Patient Alert, Is Not NPO & Can Safely Swallow). - Oral    enoxaparin (LOVENOX) syringe 40 mg 40 mg Daily 9/9/2017     Sig - Route: Inject 0.4 mL under the skin Daily. - Subcutaneous    escitalopram (LEXAPRO) tablet 10 mg 10 mg Nightly 9/8/2017     Sig - Route: Take 1 tablet by mouth Every Night. - Oral    finasteride (PROSCAR) tablet 5 mg 5 mg Daily 9/9/2017     Sig - Route: Take 1 tablet by mouth Daily. - Oral    glucagon (human recombinant) (GLUCAGEN DIAGNOSTIC) injection 1 mg 1  mg Every 15 Minutes PRN 9/8/2017     Sig - Route: Inject 1 mg under the skin Every 15 (Fifteen) Minutes As Needed (Blood Glucose Less Than 70 - Patient Without IV Access - Unresponsive, NPO or Unable To Safely Swallow). - Subcutaneous    guaiFENesin (MUCINEX) 12 hr tablet 1,200 mg 1,200 mg 2 Times Daily 9/9/2017     Sig - Route: Take 2 tablets by mouth 2 (Two) Times a Day. - Oral    HYDROcod Polst-CPM Polst ER (TUSSIONEX PENNKINETIC) 10-8 MG/5ML ER suspension 5 mL 5 mL Every 12 Hours PRN 9/8/2017 9/18/2017    Sig - Route: Take 5 mL by mouth Every 12 (Twelve) Hours As Needed for Cough. - Oral    HYDROcod Polst-CPM Polst ER (TUSSIONEX PENNKINETIC) 10-8 MG/5ML ER suspension 5 mL 5 mL Every 12 Hours PRN 9/12/2017 9/22/2017    Sig - Route: Take 5 mL by mouth Every 12 (Twelve) Hours As Needed for Cough. - Oral    insulin aspart (novoLOG) injection 0-7 Units 0-7 Units 4 Times Daily Before Meals & Nightly 9/8/2017     Sig - Route: Inject 0-7 Units under the skin 4 (Four) Times a Day Before Meals & at Bedtime. - Subcutaneous    ipratropium-albuterol (DUO-NEB) nebulizer solution 3 mL 3 mL Every 6 Hours - RT 9/9/2017     Sig - Route: Take 3 mL by nebulization Every 6 (Six) Hours. - Nebulization    losartan (COZAAR) tablet 100 mg 100 mg Daily 9/9/2017     Sig - Route: Take 2 tablets by mouth Daily. - Oral    methylPREDNISolone sodium succinate (SOLU-Medrol) injection 40 mg 40 mg Every 8 Hours 9/9/2017     Sig - Route: Infuse 1 mL into a venous catheter Every 8 (Eight) Hours. - Intravenous    ondansetron (ZOFRAN) injection 4 mg 4 mg Every 6 Hours PRN 9/8/2017     Sig - Route: Infuse 2 mL into a venous catheter Every 6 (Six) Hours As Needed for Nausea or Vomiting. - Intravenous    sodium chloride 0.9 % flush 1-10 mL 1-10 mL As Needed 9/8/2017     Sig - Route: Infuse 1-10 mL into a venous catheter As Needed for Line Care. - Intravenous    spironolactone (ALDACTONE) tablet 12.5 mg 12.5 mg Daily 9/9/2017     Sig - Route: Take 0.5  tablets by mouth Daily. - Oral    tamsulosin (FLOMAX) 24 hr capsule 0.4 mg 0.4 mg Nightly 9/8/2017     Sig - Route: Take 1 capsule by mouth Every Night. - Oral    traMADol (ULTRAM) tablet 50 mg 50 mg Every 6 Hours PRN 9/8/2017 9/18/2017    Sig - Route: Take 1 tablet by mouth Every 6 (Six) Hours As Needed for Moderate Pain . - Oral    sodium chloride 0.45 % infusion (Discontinued) 75 mL/hr Continuous 9/8/2017 9/12/2017    Sig - Route: Infuse 75 mL/hr into a venous catheter Continuous. - Intravenous

## 2017-09-13 NOTE — PLAN OF CARE
Problem: Patient Care Overview (Adult)  Goal: Plan of Care Review  Outcome: Ongoing (interventions implemented as appropriate)    09/13/17 1410   Coping/Psychosocial Response Interventions   Plan Of Care Reviewed With patient   Patient Care Overview   Progress no change         Problem: NPPV/CPAP (Adult)  Goal: Signs and Symptoms of Listed Potential Problems Will be Absent or Manageable (NPPV/CPAP)  Outcome: Ongoing (interventions implemented as appropriate)    09/13/17 1410   NPPV/CPAP   Problems Assessed (NPPV/CPAP) skin breakdown;hypoxia/hypoxemia   Problems Present (NPPV/CPAP) none

## 2017-09-13 NOTE — PLAN OF CARE
Problem: Patient Care Overview (Adult)  Goal: Plan of Care Review  Outcome: Ongoing (interventions implemented as appropriate)    09/13/17 1515   Coping/Psychosocial Response Interventions   Plan Of Care Reviewed With patient   Patient Care Overview   Progress improving   Outcome Evaluation   Outcome Summary/Follow up Plan continue antibiotics, pt still has productive cough        Goal: Adult Individualization and Mutuality  Outcome: Ongoing (interventions implemented as appropriate)  Goal: Discharge Needs Assessment  Outcome: Ongoing (interventions implemented as appropriate)    Problem: Pain, Acute (Adult)  Goal: Acceptable Pain Control/Comfort Level  Outcome: Ongoing (interventions implemented as appropriate)    Problem: Older Inpatient, Acutely Ill (Adult)  Goal: Signs and Symptoms of Listed Potential Problems Will be Absent or Manageable (Older Inpatient, Acutely Ill)  Outcome: Ongoing (interventions implemented as appropriate)    Problem: Pneumonia (Adult)  Goal: Signs and Symptoms of Listed Potential Problems Will be Absent or Manageable (Pneumonia)  Outcome: Ongoing (interventions implemented as appropriate)

## 2017-09-14 ENCOUNTER — APPOINTMENT (OUTPATIENT)
Dept: GENERAL RADIOLOGY | Facility: HOSPITAL | Age: 67
End: 2017-09-14
Attending: INTERNAL MEDICINE

## 2017-09-14 ENCOUNTER — APPOINTMENT (OUTPATIENT)
Dept: GENERAL RADIOLOGY | Facility: HOSPITAL | Age: 67
End: 2017-09-14

## 2017-09-14 LAB
ANION GAP SERPL CALCULATED.3IONS-SCNC: 11.3 MMOL/L
BASOPHILS # BLD AUTO: 0.01 10*3/MM3 (ref 0–0.2)
BASOPHILS NFR BLD AUTO: 0.1 % (ref 0–2.5)
BILIRUB UR QL STRIP: NEGATIVE
BUN BLD-MCNC: 34 MG/DL (ref 7–20)
BUN/CREAT SERPL: 37.8 (ref 6.3–21.9)
CALCIUM SPEC-SCNC: 9 MG/DL (ref 8.4–10.2)
CHLORIDE SERPL-SCNC: 99 MMOL/L (ref 98–107)
CLARITY UR: CLEAR
CO2 SERPL-SCNC: 34 MMOL/L (ref 26–30)
COLOR UR: YELLOW
CREAT BLD-MCNC: 0.9 MG/DL (ref 0.6–1.3)
DEPRECATED RDW RBC AUTO: 39.1 FL (ref 37–54)
EOSINOPHIL # BLD AUTO: 0 10*3/MM3 (ref 0–0.7)
EOSINOPHIL NFR BLD AUTO: 0 % (ref 0–7)
ERYTHROCYTE [DISTWIDTH] IN BLOOD BY AUTOMATED COUNT: 12 % (ref 11.5–14.5)
GFR SERPL CREATININE-BSD FRML MDRD: 84 ML/MIN/1.73
GLUCOSE BLD-MCNC: 121 MG/DL (ref 74–98)
GLUCOSE BLDC GLUCOMTR-MCNC: 104 MG/DL (ref 70–130)
GLUCOSE BLDC GLUCOMTR-MCNC: 119 MG/DL (ref 70–130)
GLUCOSE BLDC GLUCOMTR-MCNC: 120 MG/DL (ref 70–130)
GLUCOSE BLDC GLUCOMTR-MCNC: 198 MG/DL (ref 70–130)
GLUCOSE UR STRIP-MCNC: NEGATIVE MG/DL
HCT VFR BLD AUTO: 37.2 % (ref 42–52)
HGB BLD-MCNC: 11.9 G/DL (ref 14–18)
HGB UR QL STRIP.AUTO: NEGATIVE
IMM GRANULOCYTES # BLD: 0.15 10*3/MM3 (ref 0–0.06)
IMM GRANULOCYTES NFR BLD: 1.1 % (ref 0–0.6)
KETONES UR QL STRIP: NEGATIVE
LEUKOCYTE ESTERASE UR QL STRIP.AUTO: NEGATIVE
LYMPHOCYTES # BLD AUTO: 0.74 10*3/MM3 (ref 0.6–3.4)
LYMPHOCYTES NFR BLD AUTO: 5.2 % (ref 10–50)
MCH RBC QN AUTO: 28.7 PG (ref 27–31)
MCHC RBC AUTO-ENTMCNC: 32 G/DL (ref 30–37)
MCV RBC AUTO: 89.6 FL (ref 80–94)
MONOCYTES # BLD AUTO: 0.82 10*3/MM3 (ref 0–0.9)
MONOCYTES NFR BLD AUTO: 5.8 % (ref 0–12)
NEUTROPHILS # BLD AUTO: 12.4 10*3/MM3 (ref 2–6.9)
NEUTROPHILS NFR BLD AUTO: 87.8 % (ref 37–80)
NITRITE UR QL STRIP: NEGATIVE
NRBC BLD MANUAL-RTO: 0 /100 WBC (ref 0–0)
PH UR STRIP.AUTO: 7.5 [PH] (ref 5–8)
PLATELET # BLD AUTO: 228 10*3/MM3 (ref 130–400)
PMV BLD AUTO: 9.3 FL (ref 6–12)
POTASSIUM BLD-SCNC: 4.3 MMOL/L (ref 3.5–5.1)
PROT UR QL STRIP: NEGATIVE
RBC # BLD AUTO: 4.15 10*6/MM3 (ref 4.7–6.1)
SODIUM BLD-SCNC: 140 MMOL/L (ref 137–145)
SP GR UR STRIP: 1.01 (ref 1–1.03)
UROBILINOGEN UR QL STRIP: NORMAL
WBC NRBC COR # BLD: 14.12 10*3/MM3 (ref 4.8–10.8)

## 2017-09-14 PROCEDURE — 94799 UNLISTED PULMONARY SVC/PX: CPT

## 2017-09-14 PROCEDURE — 73502 X-RAY EXAM HIP UNI 2-3 VIEWS: CPT

## 2017-09-14 PROCEDURE — 25010000002 METHYLPREDNISOLONE PER 40 MG: Performed by: INTERNAL MEDICINE

## 2017-09-14 PROCEDURE — 81003 URINALYSIS AUTO W/O SCOPE: CPT | Performed by: NURSE PRACTITIONER

## 2017-09-14 PROCEDURE — 99232 SBSQ HOSP IP/OBS MODERATE 35: CPT | Performed by: NURSE PRACTITIONER

## 2017-09-14 PROCEDURE — 25010000002 ENOXAPARIN PER 10 MG: Performed by: INTERNAL MEDICINE

## 2017-09-14 PROCEDURE — 85025 COMPLETE CBC W/AUTO DIFF WBC: CPT | Performed by: NURSE PRACTITIONER

## 2017-09-14 PROCEDURE — 71020 HC CHEST PA AND LATERAL: CPT

## 2017-09-14 PROCEDURE — 25010000002 CEFTRIAXONE: Performed by: INTERNAL MEDICINE

## 2017-09-14 PROCEDURE — 99232 SBSQ HOSP IP/OBS MODERATE 35: CPT | Performed by: INTERNAL MEDICINE

## 2017-09-14 PROCEDURE — 63710000001 INSULIN ASPART PER 5 UNITS: Performed by: INTERNAL MEDICINE

## 2017-09-14 PROCEDURE — 25010000002 MORPHINE PER 10 MG: Performed by: INTERNAL MEDICINE

## 2017-09-14 PROCEDURE — 80048 BASIC METABOLIC PNL TOTAL CA: CPT | Performed by: NURSE PRACTITIONER

## 2017-09-14 PROCEDURE — 82962 GLUCOSE BLOOD TEST: CPT

## 2017-09-14 PROCEDURE — 74000 HC ABDOMEN KUB: CPT

## 2017-09-14 PROCEDURE — 94660 CPAP INITIATION&MGMT: CPT

## 2017-09-14 RX ORDER — MORPHINE SULFATE 2 MG/ML
1 INJECTION, SOLUTION INTRAMUSCULAR; INTRAVENOUS EVERY 4 HOURS PRN
Status: DISCONTINUED | OUTPATIENT
Start: 2017-09-14 | End: 2017-09-18 | Stop reason: HOSPADM

## 2017-09-14 RX ADMIN — INSULIN ASPART 2 UNITS: 100 INJECTION, SOLUTION INTRAVENOUS; SUBCUTANEOUS at 17:39

## 2017-09-14 RX ADMIN — IPRATROPIUM BROMIDE AND ALBUTEROL SULFATE 3 ML: .5; 3 SOLUTION RESPIRATORY (INHALATION) at 19:01

## 2017-09-14 RX ADMIN — ARFORMOTEROL TARTRATE 15 MCG: 15 SOLUTION RESPIRATORY (INHALATION) at 08:35

## 2017-09-14 RX ADMIN — CARVEDILOL 6.25 MG: 6.25 TABLET, FILM COATED ORAL at 17:38

## 2017-09-14 RX ADMIN — TRAMADOL HYDROCHLORIDE 50 MG: 50 TABLET, COATED ORAL at 04:20

## 2017-09-14 RX ADMIN — IPRATROPIUM BROMIDE AND ALBUTEROL SULFATE 3 ML: .5; 3 SOLUTION RESPIRATORY (INHALATION) at 13:33

## 2017-09-14 RX ADMIN — MORPHINE SULFATE 1 MG: 2 INJECTION, SOLUTION INTRAMUSCULAR; INTRAVENOUS at 15:31

## 2017-09-14 RX ADMIN — METHYLPREDNISOLONE SODIUM SUCCINATE 40 MG: 40 INJECTION, POWDER, FOR SOLUTION INTRAMUSCULAR; INTRAVENOUS at 17:38

## 2017-09-14 RX ADMIN — METHYLPREDNISOLONE SODIUM SUCCINATE 40 MG: 40 INJECTION, POWDER, FOR SOLUTION INTRAMUSCULAR; INTRAVENOUS at 23:16

## 2017-09-14 RX ADMIN — METHYLPREDNISOLONE SODIUM SUCCINATE 40 MG: 40 INJECTION, POWDER, FOR SOLUTION INTRAMUSCULAR; INTRAVENOUS at 11:49

## 2017-09-14 RX ADMIN — GUAIFENESIN 1200 MG: 600 TABLET, EXTENDED RELEASE ORAL at 08:22

## 2017-09-14 RX ADMIN — GUAIFENESIN 1200 MG: 600 TABLET, EXTENDED RELEASE ORAL at 17:38

## 2017-09-14 RX ADMIN — CLOPIDOGREL BISULFATE 75 MG: 75 TABLET ORAL at 08:22

## 2017-09-14 RX ADMIN — BUDESONIDE 0.5 MG: 0.5 INHALANT RESPIRATORY (INHALATION) at 19:02

## 2017-09-14 RX ADMIN — MORPHINE SULFATE 1 MG: 2 INJECTION, SOLUTION INTRAMUSCULAR; INTRAVENOUS at 08:22

## 2017-09-14 RX ADMIN — METHYLPREDNISOLONE SODIUM SUCCINATE 40 MG: 40 INJECTION, POWDER, FOR SOLUTION INTRAMUSCULAR; INTRAVENOUS at 05:38

## 2017-09-14 RX ADMIN — MORPHINE SULFATE 1 MG: 2 INJECTION, SOLUTION INTRAMUSCULAR; INTRAVENOUS at 06:06

## 2017-09-14 RX ADMIN — TAMSULOSIN HYDROCHLORIDE 0.4 MG: 0.4 CAPSULE ORAL at 20:49

## 2017-09-14 RX ADMIN — FINASTERIDE 5 MG: 5 TABLET, FILM COATED ORAL at 08:21

## 2017-09-14 RX ADMIN — IPRATROPIUM BROMIDE AND ALBUTEROL SULFATE 3 ML: .5; 3 SOLUTION RESPIRATORY (INHALATION) at 01:32

## 2017-09-14 RX ADMIN — CARVEDILOL 6.25 MG: 6.25 TABLET, FILM COATED ORAL at 08:22

## 2017-09-14 RX ADMIN — ENOXAPARIN SODIUM 40 MG: 40 INJECTION SUBCUTANEOUS at 08:22

## 2017-09-14 RX ADMIN — SPIRONOLACTONE 12.5 MG: 25 TABLET, FILM COATED ORAL at 08:21

## 2017-09-14 RX ADMIN — IPRATROPIUM BROMIDE AND ALBUTEROL SULFATE 3 ML: .5; 3 SOLUTION RESPIRATORY (INHALATION) at 08:34

## 2017-09-14 RX ADMIN — CEFTRIAXONE 1 G: 1 INJECTION, POWDER, FOR SOLUTION INTRAMUSCULAR; INTRAVENOUS at 15:31

## 2017-09-14 RX ADMIN — ESCITALOPRAM OXALATE 10 MG: 10 TABLET ORAL at 20:49

## 2017-09-14 RX ADMIN — ATORVASTATIN CALCIUM 40 MG: 40 TABLET, FILM COATED ORAL at 08:22

## 2017-09-14 RX ADMIN — LOSARTAN POTASSIUM 100 MG: 50 TABLET, FILM COATED ORAL at 08:22

## 2017-09-14 RX ADMIN — TRAMADOL HYDROCHLORIDE 50 MG: 50 TABLET, COATED ORAL at 10:40

## 2017-09-14 RX ADMIN — AMLODIPINE BESYLATE 5 MG: 5 TABLET ORAL at 08:22

## 2017-09-14 RX ADMIN — MORPHINE SULFATE 1 MG: 2 INJECTION, SOLUTION INTRAMUSCULAR; INTRAVENOUS at 20:57

## 2017-09-14 RX ADMIN — BUDESONIDE 0.5 MG: 0.5 INHALANT RESPIRATORY (INHALATION) at 08:35

## 2017-09-14 RX ADMIN — ARFORMOTEROL TARTRATE 15 MCG: 15 SOLUTION RESPIRATORY (INHALATION) at 21:55

## 2017-09-14 NOTE — PLAN OF CARE
Problem: Patient Care Overview (Adult)  Goal: Plan of Care Review  Outcome: Ongoing (interventions implemented as appropriate)    09/14/17 0313 09/14/17 1619   Coping/Psychosocial Response Interventions   Plan Of Care Reviewed With patient --    Patient Care Overview   Progress --  no change   Outcome Evaluation   Outcome Summary/Follow up Plan --  Patient still coughing some blood tinged sputum, continuing antibiotics, and control hip pain.        Goal: Adult Individualization and Mutuality  Outcome: Ongoing (interventions implemented as appropriate)  Goal: Discharge Needs Assessment  Outcome: Ongoing (interventions implemented as appropriate)    Problem: Older Inpatient, Acutely Ill (Adult)  Goal: Signs and Symptoms of Listed Potential Problems Will be Absent or Manageable (Older Inpatient, Acutely Ill)  Outcome: Ongoing (interventions implemented as appropriate)    Problem: Pneumonia (Adult)  Goal: Signs and Symptoms of Listed Potential Problems Will be Absent or Manageable (Pneumonia)  Outcome: Ongoing (interventions implemented as appropriate)

## 2017-09-14 NOTE — PLAN OF CARE
Problem: NPPV/CPAP (Adult)  Goal: Signs and Symptoms of Listed Potential Problems Will be Absent or Manageable (NPPV/CPAP)  Outcome: Ongoing (interventions implemented as appropriate)    09/14/17 0853   NPPV/CPAP   Problems Assessed (NPPV/CPAP) hypoxia/hypoxemia;situational response;dry mucous membranes;skin breakdown   Problems Present (NPPV/CPAP) none

## 2017-09-14 NOTE — PROGRESS NOTES
Continued Stay Note   [facility]     Patient Name: Thurman Mendel Parsons  MRN: [id number]  Today's Date: 9/14/2017    Admit Date: 9/8/2017          Discharge Plan       09/14/17 0934    Case Management/Social Work Plan    Additional Comments [facility]  has accepted pt will need to send updated notes on Friday 09/15  They have  a private room               Discharge Codes     None        Expected Discharge Date and Time     Expected Discharge Date Expected Discharge Time    Sep 15, 2017             Danielle Snyder from [facility] attempted to instruct pt PT was not interested at this time  Claudio to follow up at [facility]

## 2017-09-14 NOTE — SIGNIFICANT NOTE
09/14/17 1459   Rehab Treatment   Discipline occupational therapist   Treatment Not Performed patient/family declined treatment  (Pt requested to do therapy tomorrow.  Pt continues to c/o pain in his right side)

## 2017-09-14 NOTE — PROGRESS NOTES
PROGRESS NOTE        Date of Admission: 9/8/2017  Length of Stay: 6  Primary Care Physician: Miguel Rojas MD    Subjective   Chief Complaint: Dyspnea, cough  HPI:  Patient was seen today.  He is sitting up to chair at bedside.  He does have a son-in-law at the bedside.  He has agreed for rehabilitation placement.  He was seen and evaluated by Dr. Toribio with pulmonology.  Dr. Toribio has changed his IV steroids ordered a repeat chest x-ray this morning.  Patient states he is feeling somewhat better today.  He is still short of breath but it does appear to be improving.  He is still having a small amount hemoptysis however that blood is a darker in color at this time.  His sputum is only blood tinged.  He denies any chest pain, abdominal pain, nausea or vomiting.  In the night he did have some back pain for which the on-call hospitalist did order some morphine.  According to the patient the pain is in his lower back and radiates around to his right hip and flank.  We will check a x-ray however he denies any injury or falls.  We will also check a urinalysis.  He is on 5 L oxygen nasal cannula for which this is what he is on at home.  His hypercapnia has improved with the BiPAP usage during this admission.  He did bring in his home BiPAP machine for which it is never been opened.      Hospital Course:  This is a 67-year-old male who was admitted with a right basilar pneumonia and COPD exacerbation.  Patient has a history of COPD.  He was treated as an outpatient with doxycycline and steroids but did not really show any improvement.  When he came into the emergency room he was noted have oxygen saturations in the 80s.  He was placed on a nonrebreather pelvis his blood gas showed a PCO2 97 to he was switched to BiPAP.  After this he did start showing improvement.  He has been getting IV antibiotics in the form Rocephin and azithromycin as well as breathing treatments mucolytics and steroids.  He is still complaining of  cough this morning which is productive with very thick brownish colored sputum.  He is getting mucolytics.  Overall he may be feeling somewhat better.  He was seen and evaluated by cardiology for an elevated troponin.  It is felt to be a type to myocardial injury secondary to supply demand mismatch.  Cardiology does not recommend any treatment for a non-ST elevated myocardial infarction.           Review Of Systems:   Review of Systems   General ROS: Patient denies any fevers, chills or loss of consciousness.    Psychological ROS: Denies any hallucinations and delusions.  Ophthalmic ROS: Denies any diplopia or transient loss of vision.  ENT ROS: Denies sore throat, nasal congestion or ear pain.   Allergy and Immunology ROS: Denies rash or itching.  Hematological and Lymphatic ROS: Denies neck swelling or easy bleeding.  Endocrine ROS: Denies any recent unintentional weight gain or loss.  Breast ROS: Denies any pain or swelling.  Respiratory ROS: cough productive with some hemoptysis, SOA  Cardiovascular ROS: Denies chest pain or palpitations. No history of exertional chest pain.   Gastrointestinal ROS: Denies nausea and vomiting. Denies any abdominal pain. No diarrhea.  Genito-Urinary ROS: Denies dysuria or hematuria.  Musculoskeletal ROS: Denies back pain. No muscle pain. No calf pain.   Neurological ROS: Denies any focal weakness. No loss of consciousness. Denies any numbness. Denies neck pain.   Dermatological ROS: Denies any redness or pruritis.    Objective      Temp:  [97 °F (36.1 °C)-98.3 °F (36.8 °C)] 97.5 °F (36.4 °C)  Heart Rate:  [72-95] 95  Resp:  [16-22] 20  BP: (129-157)/(55-99) 157/92  Physical Exam    General Appearance:  Alert and cooperative, not in any acute distress.   Head:  Atraumatic and normocephalic, without obvious abnormality.   Eyes:          PERRLA, conjunctivae and sclerae normal, no Icterus. No pallor. Extra-occular movements are within normal limits.   Ears:  Ears appear intact with  no abnormalities noted.   Throat: No oral lesions, no thrush, oral mucosa moist.   Neck: Supple, trachea midline, no thyromegaly, no carotid bruit.   Back:   No kyphoscoliosis present. No tenderness to palpation,   range of motion normal.   Lungs:   Chest shape is normal. Breath sounds heard bilaterally equally.  Crackles bilaterally.  Pursed lip breathing.  No Pleural rub or bronchial breathing.   Heart:  Normal S1 and S2, no murmur, no gallop, no rub. No JVD   Abdomen:   Normal bowel sounds, no masses, no organomegaly. Soft     non-tender, non-distended, no guarding, no rebound                tenderness   Extremities: Moves all extremities well, no edema, no cyanosis, no clubbing.   Pulses: Pulses palpable and equal bilaterally   Skin: No bleeding, bruising or rash   Lymph nodes: No palpable adenopathy   Neurologic:    Psychiatric/Behavior:     Cranial nerves 2 - 12 grossly intact, sensation intact, Motor power is normal and equal bilaterally.  Mood normal, behavior normal       Results Review:    I have reviewed the labs, radiology results and diagnostic studies.      Results from last 7 days  Lab Units 09/14/17  0616   WBC 10*3/mm3 14.12*   HEMOGLOBIN g/dL 11.9*   PLATELETS 10*3/mm3 228       Results from last 7 days  Lab Units 09/14/17  0616   SODIUM mmol/L 140   POTASSIUM mmol/L 4.3   CO2 mmol/L 34.0*   CREATININE mg/dL 0.90   GLUCOSE mg/dL 121*       Culture Data:   Blood Culture   Date Value Ref Range Status   09/08/2017 No growth at 2 days  Preliminary   09/08/2017 No growth at 3 days  Preliminary     Respiratory Culture   Date Value Ref Range Status   09/11/2017 Rejected  Final     Radiology Data:   Cardiology Data:    I have reviewed the medications.    Assessment/Plan     Assessment and Plan:  1.  bibasilar pneumonia present upon admission-patient's IV antibiotics in the form Rocephin and azithromycin breathing treatments mucolytics and IV steroids.  Respiratory culture was rejected.  I have ordered a  repeat sputum.    Blood cultures have not shown any growth at this time.  Dr. Toribio with pulmonology has been consulted.  He has made some changes to his treatments regimen.  Repeat chest xray has been ordered for this morning.    2.  Acute on chronic hypoxichypercapnic respiratory failure- patient has been on BiPAP.  He does have a BiPAP at home for which we've instructed the family to bring him.  According to the patient's difficult for her needs his BiPAP machine at home since he lives alone.  3.  COPD with exacerbation-same treatment is #1    4.  Diabetes mellitus type 2-patient on insulin protocol.  Blood sugars are stable.  Continue to monitor and titrate insulin accordingly  Will continue with current regimen.      5.  Type II MI, secondary to supply demand mismatch-  radiology has seen and evaluated the patient does not feel this is a non-ST elevated myocardial infarction.  And will not treat as such.  He feels it is secondary to supply demand mismatch.    6.  Chronic essential hypertension-blood pressure stable continue to monitor.    7.  Chronic kidney disease CKD3-no function is at baseline if not better.    8.  Coronary artery disease-medical management    9.  Obstructive sleep apnea-  Patient is on Bipap here and doing we.  He has Bipap at home for which he has been noncompliant.  He states the willingness to use his Bipap at home.      10. Generalized weakness- PT OT has been consulted.  He did have a discussion with the patient regarding rehabilitation placement and he is in agreement.    11.  Hemoptysis-  Improving.  H&H stable.        DVT prophylaxis:  Lovenox  Discharge Planning:   Monse Evans, APRN 09/14/17 11:04 AM

## 2017-09-14 NOTE — PLAN OF CARE
Problem: NPPV/CPAP (Adult)  Intervention: Monitor/Manage Anxiety Related to NPPV/CPAP    09/14/17 0138   Coping Strategies   Trust Relationship/Rapport care explained;choices provided       Intervention: Prevent Aspiration During Therapy    09/14/17 0138   Positioning   Head Of Bed (HOB) Position HOB elevated         Goal: Signs and Symptoms of Listed Potential Problems Will be Absent or Manageable (NPPV/CPAP)  Outcome: Ongoing (interventions implemented as appropriate)    09/14/17 0138   NPPV/CPAP   Problems Assessed (NPPV/CPAP) hypoxia/hypoxemia;dry mucous membranes;situational response;skin breakdown   Problems Present (NPPV/CPAP) none

## 2017-09-14 NOTE — PROGRESS NOTES
"  CC: Shortness of breath.  Appears sputum production.  COPD exacerbation    S: Continues to have cough and phlegm production.  The phlegm is mostly dark brownish to clear.  No new episodes of hemoptysis.    O: /92 (BP Location: Left arm, Patient Position: Sitting)  Pulse 95  Temp 97.5 °F (36.4 °C) (Axillary)   Resp 20  Ht 63\" (160 cm)  Wt 158 lb 4.8 oz (71.8 kg)  SpO2 93%  BMI 28.04 kg/m2      General: Mild respiratory distress noted.  Neck: No JVD   Cardiovascular: S1 + S2.    Respiratory: Diffuse bilateral wheezing heard.  No significant crackles noted  Extremities: No edema noted.  Neurologic: AAOx3. Was able to follow commands     Labs: Reviewed.       Results from last 7 days  Lab Units 09/14/17  0616 09/12/17  0630 09/11/17  0538  09/08/17  1300   SODIUM mmol/L 140 140 141  < > 145   POTASSIUM mmol/L 4.3 4.4 4.2  < > 4.4   CHLORIDE mmol/L 99 100 99  < > 96*   CO2 mmol/L 34.0* 35.0* 36.0*  < > 42.0*   BUN mg/dL 34* 29* 31*  < > 26*   CREATININE mg/dL 0.90 0.80 0.80  < > 1.10   CALCIUM mg/dL 9.0 8.8 8.8  < > 10.5*   BILIRUBIN mg/dL  --   --   --   --  1.3   ALK PHOS U/L  --   --   --   --  90   ALT (SGPT) U/L  --   --   --   --  37   AST (SGOT) U/L  --   --   --   --  31   GLUCOSE mg/dL 121* 129* 138*  < > 127*   < > = values in this interval not displayed.      Results from last 7 days  Lab Units 09/09/17  0605   MAGNESIUM mg/dL 2.1           Results from last 7 days  Lab Units 09/14/17  0616 09/13/17  1350 09/12/17  0630 09/11/17  0538 09/10/17  0653   WBC 10*3/mm3 14.12* 14.45* 12.73* 15.78* 15.04*   HEMOGLOBIN g/dL 11.9* 12.1* 11.3* 11.1* 10.2*   PLATELETS 10*3/mm3 228 215 197 203 179       Assessment & Recommendations/Plan:   1.  Acute hypercarbic respiratory failure    2.  Acute exacerbation of COPD    3.  Likely acute bronchitis    4. ?  Hemoptysis    5.?  Pneumonia    6.  Hypoxemia    7.  Smoking.     Chest percussion and flutter valve added.  Plan discussed with the respiratory therapist " at the bedside.  We will adjust his medications tomorrow based on the response.    We have reviewed patient's current orders and changes, if any, have been suggested to primary care team. Plan was also discussed with nursing staff, as necessary.       Slim Toribio MD  09/14/17  10:45 AM    Dictated utilizing Dragon dictation.

## 2017-09-14 NOTE — SIGNIFICANT NOTE
09/14/17 1056   Rehab Treatment   Discipline occupational therapist   Treatment Not Performed patient/family declined treatment  (Pt having back pain radiating to his groin.  Pt having a test later today.  Will follow up with pt later.)

## 2017-09-14 NOTE — PLAN OF CARE
Problem: Patient Care Overview (Adult)  Goal: Plan of Care Review  Outcome: Ongoing (interventions implemented as appropriate)    09/14/17 0313   Coping/Psychosocial Response Interventions   Plan Of Care Reviewed With patient   Patient Care Overview   Progress no change         Problem: Pain, Acute (Adult)  Goal: Acceptable Pain Control/Comfort Level  Outcome: Outcome(s) achieved Date Met:  09/14/17 09/14/17 0313   Pain, Acute (Adult)   Acceptable Pain Control/Comfort Level making progress toward outcome         Problem: Older Inpatient, Acutely Ill (Adult)  Goal: Signs and Symptoms of Listed Potential Problems Will be Absent or Manageable (Older Inpatient, Acutely Ill)  Outcome: Ongoing (interventions implemented as appropriate)    09/14/17 0313   Older Adult Inpatient, Acutely Ill   Problems Assessed (Acutely Ill Older Adult) all   Problems Present (Acutely Ill Older Adult) functional decline/self-care deficit;situational response         Problem: Pneumonia (Adult)  Goal: Signs and Symptoms of Listed Potential Problems Will be Absent or Manageable (Pneumonia)  Outcome: Ongoing (interventions implemented as appropriate)    09/14/17 0313   Pneumonia   Problems Assessed (Pneumonia) all   Problems Present (Pneumonia) progression of infection;respiratory compromise

## 2017-09-15 LAB
GLUCOSE BLDC GLUCOMTR-MCNC: 122 MG/DL (ref 70–130)
GLUCOSE BLDC GLUCOMTR-MCNC: 129 MG/DL (ref 70–130)
GLUCOSE BLDC GLUCOMTR-MCNC: 130 MG/DL (ref 70–130)

## 2017-09-15 PROCEDURE — 25010000002 ENOXAPARIN PER 10 MG: Performed by: INTERNAL MEDICINE

## 2017-09-15 PROCEDURE — 94660 CPAP INITIATION&MGMT: CPT

## 2017-09-15 PROCEDURE — 25010000002 CEFTRIAXONE: Performed by: INTERNAL MEDICINE

## 2017-09-15 PROCEDURE — 25010000002 METHYLPREDNISOLONE PER 40 MG: Performed by: INTERNAL MEDICINE

## 2017-09-15 PROCEDURE — 94799 UNLISTED PULMONARY SVC/PX: CPT

## 2017-09-15 PROCEDURE — 99232 SBSQ HOSP IP/OBS MODERATE 35: CPT | Performed by: INTERNAL MEDICINE

## 2017-09-15 PROCEDURE — 82962 GLUCOSE BLOOD TEST: CPT

## 2017-09-15 PROCEDURE — 63710000001 INSULIN ASPART PER 5 UNITS: Performed by: INTERNAL MEDICINE

## 2017-09-15 PROCEDURE — 97110 THERAPEUTIC EXERCISES: CPT

## 2017-09-15 PROCEDURE — 97530 THERAPEUTIC ACTIVITIES: CPT

## 2017-09-15 PROCEDURE — 99232 SBSQ HOSP IP/OBS MODERATE 35: CPT | Performed by: NURSE PRACTITIONER

## 2017-09-15 RX ORDER — GUAIFENESIN, PSEUDOEPHEDRINE HYDROCHLORIDE 600; 60 MG/1; MG/1
1 TABLET, EXTENDED RELEASE ORAL EVERY 12 HOURS SCHEDULED
Status: DISCONTINUED | OUTPATIENT
Start: 2017-09-15 | End: 2017-09-18 | Stop reason: HOSPADM

## 2017-09-15 RX ORDER — ACETYLCYSTEINE 200 MG/ML
600 SOLUTION ORAL; RESPIRATORY (INHALATION) EVERY 8 HOURS
Status: DISCONTINUED | OUTPATIENT
Start: 2017-09-15 | End: 2017-09-17

## 2017-09-15 RX ORDER — POLYETHYLENE GLYCOL 3350 17 G/17G
17 POWDER, FOR SOLUTION ORAL ONCE
Status: COMPLETED | OUTPATIENT
Start: 2017-09-15 | End: 2017-09-15

## 2017-09-15 RX ADMIN — POLYETHYLENE GLYCOL 3350 17 G: 17 POWDER, FOR SOLUTION ORAL at 17:15

## 2017-09-15 RX ADMIN — ACETAMINOPHEN 650 MG: 325 TABLET, FILM COATED ORAL at 12:00

## 2017-09-15 RX ADMIN — BUDESONIDE 0.5 MG: 0.5 INHALANT RESPIRATORY (INHALATION) at 19:54

## 2017-09-15 RX ADMIN — HYDROCODONE POLISTIREX AND CHLORPHENIRAMINE POLISTIREX 5 ML: 10; 8 SUSPENSION, EXTENDED RELEASE ORAL at 10:15

## 2017-09-15 RX ADMIN — IPRATROPIUM BROMIDE AND ALBUTEROL SULFATE 3 ML: .5; 3 SOLUTION RESPIRATORY (INHALATION) at 19:54

## 2017-09-15 RX ADMIN — GUAIFENESIN AND PSEUDOEPHEDRINE HYDROCHLORIDE 1 TABLET: 600; 60 TABLET, EXTENDED RELEASE ORAL at 13:46

## 2017-09-15 RX ADMIN — ENOXAPARIN SODIUM 40 MG: 40 INJECTION SUBCUTANEOUS at 09:01

## 2017-09-15 RX ADMIN — FINASTERIDE 5 MG: 5 TABLET, FILM COATED ORAL at 09:01

## 2017-09-15 RX ADMIN — CARVEDILOL 6.25 MG: 6.25 TABLET, FILM COATED ORAL at 17:15

## 2017-09-15 RX ADMIN — GUAIFENESIN AND PSEUDOEPHEDRINE HYDROCHLORIDE 1 TABLET: 600; 60 TABLET, EXTENDED RELEASE ORAL at 20:48

## 2017-09-15 RX ADMIN — IPRATROPIUM BROMIDE AND ALBUTEROL SULFATE 3 ML: .5; 3 SOLUTION RESPIRATORY (INHALATION) at 12:49

## 2017-09-15 RX ADMIN — BUDESONIDE 0.5 MG: 0.5 INHALANT RESPIRATORY (INHALATION) at 07:02

## 2017-09-15 RX ADMIN — INSULIN ASPART 2 UNITS: 100 INJECTION, SOLUTION INTRAVENOUS; SUBCUTANEOUS at 20:49

## 2017-09-15 RX ADMIN — IPRATROPIUM BROMIDE AND ALBUTEROL SULFATE 3 ML: .5; 3 SOLUTION RESPIRATORY (INHALATION) at 07:02

## 2017-09-15 RX ADMIN — LOSARTAN POTASSIUM 100 MG: 50 TABLET, FILM COATED ORAL at 09:01

## 2017-09-15 RX ADMIN — METHYLPREDNISOLONE SODIUM SUCCINATE 40 MG: 40 INJECTION, POWDER, FOR SOLUTION INTRAMUSCULAR; INTRAVENOUS at 05:45

## 2017-09-15 RX ADMIN — CLOPIDOGREL BISULFATE 75 MG: 75 TABLET ORAL at 09:01

## 2017-09-15 RX ADMIN — TRAMADOL HYDROCHLORIDE 50 MG: 50 TABLET, COATED ORAL at 12:00

## 2017-09-15 RX ADMIN — BENZONATATE 200 MG: 100 CAPSULE, LIQUID FILLED ORAL at 09:01

## 2017-09-15 RX ADMIN — HYDROCODONE POLISTIREX AND CHLORPHENIRAMINE POLISTIREX 5 ML: 10; 8 SUSPENSION, EXTENDED RELEASE ORAL at 22:27

## 2017-09-15 RX ADMIN — METHYLPREDNISOLONE SODIUM SUCCINATE 40 MG: 40 INJECTION, POWDER, FOR SOLUTION INTRAMUSCULAR; INTRAVENOUS at 22:28

## 2017-09-15 RX ADMIN — GUAIFENESIN 1200 MG: 600 TABLET, EXTENDED RELEASE ORAL at 09:02

## 2017-09-15 RX ADMIN — CEFTRIAXONE 1 G: 1 INJECTION, POWDER, FOR SOLUTION INTRAMUSCULAR; INTRAVENOUS at 17:21

## 2017-09-15 RX ADMIN — ATORVASTATIN CALCIUM 40 MG: 40 TABLET, FILM COATED ORAL at 09:01

## 2017-09-15 RX ADMIN — IPRATROPIUM BROMIDE AND ALBUTEROL SULFATE 3 ML: .5; 3 SOLUTION RESPIRATORY (INHALATION) at 00:05

## 2017-09-15 RX ADMIN — Medication 10 ML: at 22:28

## 2017-09-15 RX ADMIN — METHYLPREDNISOLONE SODIUM SUCCINATE 40 MG: 40 INJECTION, POWDER, FOR SOLUTION INTRAMUSCULAR; INTRAVENOUS at 17:15

## 2017-09-15 RX ADMIN — AMLODIPINE BESYLATE 5 MG: 5 TABLET ORAL at 09:01

## 2017-09-15 RX ADMIN — ESCITALOPRAM OXALATE 10 MG: 10 TABLET ORAL at 20:48

## 2017-09-15 RX ADMIN — SPIRONOLACTONE 12.5 MG: 25 TABLET, FILM COATED ORAL at 09:02

## 2017-09-15 RX ADMIN — CARVEDILOL 6.25 MG: 6.25 TABLET, FILM COATED ORAL at 09:01

## 2017-09-15 RX ADMIN — ARFORMOTEROL TARTRATE 15 MCG: 15 SOLUTION RESPIRATORY (INHALATION) at 20:11

## 2017-09-15 RX ADMIN — METHYLPREDNISOLONE SODIUM SUCCINATE 40 MG: 40 INJECTION, POWDER, FOR SOLUTION INTRAMUSCULAR; INTRAVENOUS at 11:55

## 2017-09-15 RX ADMIN — TAMSULOSIN HYDROCHLORIDE 0.4 MG: 0.4 CAPSULE ORAL at 20:48

## 2017-09-15 NOTE — PROGRESS NOTES
"  CC: Shortness of breath.  Appears sputum production.  COPD exacerbation    S: Continues to have cough and phlegm production.  The phlegm is mostly dark brownish to clear.  No new episodes of hemoptysis.    Feels that the breathing has definitely improved since admission.  Continues to have spells of coughing.    O: /69 (BP Location: Right arm, Patient Position: Sitting)  Pulse 73  Temp 98.1 °F (36.7 °C) (Oral)   Resp 18  Ht 63\" (160 cm)  Wt 149 lb 9.6 oz (67.9 kg)  SpO2 93%  BMI 26.5 kg/m2    General: Mild respiratory distress noted.  Neck: No JVD   Cardiovascular: S1 + S2.    Respiratory: Diffuse bilateral wheezing heard.  No significant crackles noted  Extremities: No edema noted.  Neurologic: AAOx3. Was able to follow commands     Labs: Reviewed.       Results from last 7 days  Lab Units 09/14/17  0616 09/12/17  0630 09/11/17  0538  09/08/17  1300   SODIUM mmol/L 140 140 141  < > 145   POTASSIUM mmol/L 4.3 4.4 4.2  < > 4.4   CHLORIDE mmol/L 99 100 99  < > 96*   CO2 mmol/L 34.0* 35.0* 36.0*  < > 42.0*   BUN mg/dL 34* 29* 31*  < > 26*   CREATININE mg/dL 0.90 0.80 0.80  < > 1.10   CALCIUM mg/dL 9.0 8.8 8.8  < > 10.5*   BILIRUBIN mg/dL  --   --   --   --  1.3   ALK PHOS U/L  --   --   --   --  90   ALT (SGPT) U/L  --   --   --   --  37   AST (SGOT) U/L  --   --   --   --  31   GLUCOSE mg/dL 121* 129* 138*  < > 127*   < > = values in this interval not displayed.      Results from last 7 days  Lab Units 09/09/17  0605   MAGNESIUM mg/dL 2.1           Results from last 7 days  Lab Units 09/14/17  0616 09/13/17  1350 09/12/17  0630 09/11/17  0538 09/10/17  0653   WBC 10*3/mm3 14.12* 14.45* 12.73* 15.78* 15.04*   HEMOGLOBIN g/dL 11.9* 12.1* 11.3* 11.1* 10.2*   PLATELETS 10*3/mm3 228 215 197 203 179       Assessment & Recommendations/Plan:   1.  Acute hypercarbic respiratory failure    2.  Acute exacerbation of COPD    3.  Likely acute bronchitis    4. ?  Hemoptysis    5.?  Pneumonia    6.  Hypoxemia    7.  " Smoking.     Will add Mucinex.  Also will consider acetylcysteine.    We have reviewed patient's current orders and changes, if any, have been suggested to primary care team. Plan was also discussed with nursing staff, as necessary.       Slim Toribio MD  09/15/17  12:23 PM    Dictated utilizing Dragon dictation.

## 2017-09-15 NOTE — PROGRESS NOTES
Continued Stay Note  CARLOS Shane     Patient Name: Thurman Mendel Parsons  MRN: [id number]  Today's Date: 9/15/2017    Admit Date: 9/8/2017          Discharge Plan       09/15/17 1251    Case Management/Social Work Plan    Additional Comments Pt has been accpeted to  [facility] Will need updated notes sent to them for notification  Per Monse he will not be discahrged over the weekend               Discharge Codes     None        Expected Discharge Date and Time     Expected Discharge Date Expected Discharge Time    Sep 18, 2017             Danielle Saldana

## 2017-09-15 NOTE — PROGRESS NOTES
PROGRESS NOTE        Date of Admission: 9/8/2017  Length of Stay: 7  Primary Care Physician: Miguel Rojas MD    Subjective   Chief Complaint: Dyspnea, cough  HPI:  Patient was seen today. He had no events overnight.  He was admitted with COPD exacerbation right basilar pneumonia.  Dr. Toribio with pulmonology has been consulted and followed along with hospitalist service.  He has had some hemoptysis which does appear to be at more brownish in color at this time he is still having significant brownish to yellow sputum production.  He is complaining of worsening cough today.  We have added Mucomyst to his regimen as well as Tussionex to help with his cough.  He did have an episode not before last where he had back pain that radiated into his right groin.  A KUB was done which did show right nephrolithiasis however there was no evidence of obstruction.  This pain is possibly secondary from passage of a renal stone.  Hip x-ray and pelvis was also done which showed some degenerative changes but no acute abnormalities.  Repeat chest x-ray done yesterday showed chronic changes with bilateral atelectasis.  Patient denies any chest pain.  The pain that he had in his lower back that radiated around to his right groin has resolved.  He denies any nausea or vomiting.  I long discussion was had regarding rehabilitation placement and he is in agreement for rehabilitation placement once he is stable for discharge.      Hospital Course:  This is a 67-year-old male who was admitted with a right basilar pneumonia and COPD exacerbation.  Patient has a history of COPD.  He was treated as an outpatient with doxycycline and steroids but did not really show any improvement.  When he came into the emergency room he was noted have oxygen saturations in the 80s.  He was placed on a nonrebreather pelvis his blood gas showed a PCO2 97 to he was switched to BiPAP.  After this he did start showing improvement.  He has been getting IV  antibiotics in the form Rocephin and azithromycin as well as breathing treatments mucolytics and steroids.  He is still complaining of cough this morning which is productive with very thick brownish colored sputum.  He is getting mucolytics.  Overall he may be feeling somewhat better.  He was seen and evaluated by cardiology for an elevated troponin.  It is felt to be a type to myocardial injury secondary to supply demand mismatch.  Cardiology does not recommend any treatment for a non-ST elevated myocardial infarction.           Review Of Systems:   Review of Systems   General ROS: Patient denies any fevers, chills or loss of consciousness.    Psychological ROS: Denies any hallucinations and delusions.  Ophthalmic ROS: Denies any diplopia or transient loss of vision.  ENT ROS: Denies sore throat, nasal congestion or ear pain.   Allergy and Immunology ROS: Denies rash or itching.  Hematological and Lymphatic ROS: Denies neck swelling or easy bleeding.  Endocrine ROS: Denies any recent unintentional weight gain or loss.  Breast ROS: Denies any pain or swelling.  Respiratory ROS: cough productive with some hemoptysis, SOA  Cardiovascular ROS: Denies chest pain or palpitations. No history of exertional chest pain.   Gastrointestinal ROS: Denies nausea and vomiting. Denies any abdominal pain. No diarrhea.  Genito-Urinary ROS: Denies dysuria or hematuria.  Musculoskeletal ROS: Denies back pain. No muscle pain. No calf pain.   Neurological ROS: Denies any focal weakness. No loss of consciousness. Denies any numbness. Denies neck pain.   Dermatological ROS: Denies any redness or pruritis.    Objective      Temp:  [97.3 °F (36.3 °C)-98.5 °F (36.9 °C)] 98.1 °F (36.7 °C)  Heart Rate:  [66-86] 73  Resp:  [17-20] 18  BP: (101-149)/(69-84) 101/69  Physical Exam    General Appearance:  Alert and cooperative, not in any acute distress.   Head:  Atraumatic and normocephalic, without obvious abnormality.   Eyes:          PERRLA,  conjunctivae and sclerae normal, no Icterus. No pallor. Extra-occular movements are within normal limits.   Ears:  Ears appear intact with no abnormalities noted.   Throat: No oral lesions, no thrush, oral mucosa moist.   Neck: Supple, trachea midline, no thyromegaly, no carotid bruit.   Back:   No kyphoscoliosis present. No tenderness to palpation,   range of motion normal.   Lungs:   Chest shape is normal. Breath sounds heard bilaterally equally.  Rhonchi and wheezing bilaterally.  Pursed lip breathing.  No Pleural rub or bronchial breathing.   Heart:  Normal S1 and S2, no murmur, no gallop, no rub. No JVD   Abdomen:   Normal bowel sounds, no masses, no organomegaly. Soft     non-tender, non-distended, no guarding, no rebound                tenderness   Extremities: Moves all extremities well, no edema, no cyanosis, no clubbing.   Pulses: Pulses palpable and equal bilaterally   Skin: No bleeding, bruising or rash   Lymph nodes: No palpable adenopathy   Neurologic:    Psychiatric/Behavior:     Cranial nerves 2 - 12 grossly intact, sensation intact, Motor power is normal and equal bilaterally.  Mood normal, behavior normal       Results Review:    I have reviewed the labs, radiology results and diagnostic studies.      Results from last 7 days  Lab Units 09/14/17  0616   WBC 10*3/mm3 14.12*   HEMOGLOBIN g/dL 11.9*   PLATELETS 10*3/mm3 228       Results from last 7 days  Lab Units 09/14/17  0616   SODIUM mmol/L 140   POTASSIUM mmol/L 4.3   CO2 mmol/L 34.0*   CREATININE mg/dL 0.90   GLUCOSE mg/dL 121*       Culture Data:   Blood Culture   Date Value Ref Range Status   09/08/2017 No growth at 2 days  Preliminary   09/08/2017 No growth at 3 days  Preliminary     Respiratory Culture   Date Value Ref Range Status   09/11/2017 Rejected  Final     Radiology Data:   Cardiology Data:    I have reviewed the medications.    Assessment/Plan     Assessment and Plan:  1.  bibasilar pneumonia present upon admission-patient's IV  antibiotics in the form Rocephin and azithromycin breathing treatments mucolytics and IV steroids.  Respiratory culture was rejected.  I have ordered a repeat sputum.    Blood cultures have not shown any growth at this time.  Dr. Toribio with pulmonology has been consultedAnd is following along with the hospitalist service.  He has made some changes to his treatments regimen.  We have added Mucomyst as well as Tussionex to his regimen.    2.  Acute on chronic hypoxichypercapnic respiratory failure- patient has been on BiPAP.  He does have a BiPAP at home for which we've instructed the family to bring him.  According to the patient's difficult for her needs his BiPAP machine at home since he lives alone.    3.  COPD with exacerbation-same treatment is #1    4.  Diabetes mellitus type 2-patient on insulin protocol.  Blood sugars are stable.  Continue to monitor and titrate insulin accordingly  Will continue with current regimen.      5.  Type II MI, secondary to supply demand mismatch-  radiology has seen and evaluated the patient does not feel this is a non-ST elevated myocardial infarction.  And will not treat as such.  He feels it is secondary to supply demand mismatch.    6.  Chronic essential hypertension-blood pressure stable continue to monitor.    7.  Chronic kidney disease CKD3-  Stable    8.  Coronary artery disease-medical management    9.  Obstructive sleep apnea-  Patient is on Bipap here and doing we.  He has Bipap at home for which he has been noncompliant.  He states the willingness to use his Bipap at home.      10. Generalized weakness- PT OT has been consulted.  He did have a discussion with the patient regarding rehabilitation placement and he is in agreement.    11.  Hemoptysis-  Improving.  H&H stable.        DVT prophylaxis:  Lovenox  Discharge Planning:   Monse Evans, PAL 09/15/17 2:32 PM

## 2017-09-15 NOTE — PLAN OF CARE
Problem: Patient Care Overview (Adult)  Goal: Plan of Care Review  Outcome: Ongoing (interventions implemented as appropriate)    09/15/17 1308   Coping/Psychosocial Response Interventions   Plan Of Care Reviewed With patient   Patient Care Overview   Progress improving   Outcome Evaluation   Outcome Summary/Follow up Plan OT tx completed. Patient c/o pain 2/2 kidney stone limiting distance he walked today to 164. Completed sit to stand transfers x 10 for strengthening and completed UB ther ex and trunk exercises in sitting and standing to challenge endurance and balance. Continue OT POC         Problem: Inpatient Occupational Therapy  Goal: Transfer Training Goal 1 LTG- OT  Outcome: Ongoing (interventions implemented as appropriate)    09/11/17 1331 09/15/17 1308   Transfer Training OT LTG   Transfer Training OT LTG, Date Established 09/11/17 --    Transfer Training OT LTG, Time to Achieve by discharge --    Transfer Training OT LTG, Activity Type sit to stand/stand to sit --    Transfer Training OT LTG, [place] Level independent --    Transfer Training OT LTG, Date Goal Reviewed --  09/15/17   Transfer Training OT LTG, Outcome --  goal ongoing       Goal: LB Dressing Goal LTG- OT  Outcome: Ongoing (interventions implemented as appropriate)    09/11/17 1331 09/15/17 1308   LB Dressing OT LTG   LB Dressing Goal OT LTG, Date Established 09/11/17 --    LB Dressing Goal OT LTG, Time to Achieve by discharge --    LB Dressing Goal OT LTG, [place] Level set up required --    LB Dressing Goal OT LTG, Date Goal Reviewed --  09/15/17   LB Dressing Goal OT LTG, Outcome --  goal ongoing       Goal: Functional Mobility Goal LTG- OT  Outcome: Ongoing (interventions implemented as appropriate)    09/11/17 1331 09/15/17 1308   Functional Mobility OT LTG   Functional Mobility Goal OT LTG, Date Established 09/11/17 --    Functional Mobility Goal OT LTG, Time to Achieve by discharge --    Functional Mobility Goal OT LTG,  [place] Level independent with device --    Functional Mobility Goal OT LTG, Assist Device rolling walker --    Functional Mobility Goal OT LTG, Distance to Achieve in hallway --    Functional Mobility Goal OT LTG, Additional Goal Pt will walk 350' with sba using RW and O2 --    Functional Mobility Goal OT LTG, Date Goal Reviewed --  09/15/17   Functional Mobility Goal OT LTG, Outcome --  goal ongoing

## 2017-09-15 NOTE — THERAPY TREATMENT NOTE
Acute Care - Occupational Therapy Treatment Note   [place]     Patient Name: Thurman Mendel Parsons  : 1950  MRN: [id number]  Today's Date: 9/15/2017  Onset of Illness/Injury or Date of Surgery Date: 17  Date of Referral to OT: 17  Referring Physician: Dr. Mendez      Admit Date: 2017    Visit Dx:     ICD-10-CM ICD-9-CM   1. Pneumonia of right lower lobe due to infectious organism J18.9 483.8   2. COPD exacerbation J44.1 491.21   3. Acute on chronic respiratory failure with hypercapnia J96.22 518.84   4. Impaired functional mobility and activity tolerance Z74.09 V49.89   5. Impaired mobility and ADLs Z74.09 799.89     Patient Active Problem List   Diagnosis   • Anxiety   • Atherosclerotic heart disease of native coronary artery without angina pectoris   • Atrial fibrillation   • Chronic bronchitis   • Chronic kidney disease, stage III (moderate)   • Steroid-dependent COPD   • Degeneration of cervical intervertebral disc   • Diabetes mellitus   • GERD without esophagitis   • Diverticulosis   • Hemorrhoids   • Hiatal hernia   • Hyperlipidemia   • Hypertension   • Kyphosis, acquired   • Mitral and aortic valve disease   • Phimosis   • Sleep apnea   • Enlarged prostate without lower urinary tract symptoms (luts)   • History of arthritis   • Back pain   • Cardiac pain   • Depression   • Hematuria   • Stroke syndrome   • History of ventricular septal defect   • Neck pain   • COPD exacerbation   • Pneumonia of right lower lobe due to infectious organism             Adult Rehabilitation Note       09/15/17 1126 17 0935       Rehab Assessment/Intervention    Discipline occupational therapist  -SD occupational therapist  -HE     Document Type therapy note (daily note)  -SD therapy note (daily note)  -HE     Subjective Information agree to therapy;complains of;pain  -SD complains of;dyspnea  -HE     Patient Effort, Rehab Treatment good  -SD good  -HE     Symptoms Noted During/After Treatment  fatigue  -SD shortness of breath  -HE     Precautions/Limitations  fall precautions;oxygen therapy device and L/min  -HE     Patient Response to Treatment  --   Tolerated well  -HE     Recorded by [SD] Kelle Edwards OT [HE] Farhan Ontiveros     Vital Signs    Pre SpO2 (%)  95  -HE     O2 Delivery Pre Treatment  supplemental O2   [id number]  -HE     Intra SpO2 (%)  --   95  -HE     O2 Delivery Intra Treatment  supplemental O2  -HE     Post SpO2 (%)  --   95  -HE     O2 Delivery Post Treatment  supplemental O2  -HE     Intra Patient Position  Supine  -HE     Recorded by  [HE] Farhan Ontiveros     Pain Assessment    Pain Assessment 0-10  -SD No/denies pain  -HE     Pain Score 5  -SD      Post Pain Score 5  -SD 0  -HE     Pain Type Acute pain  -SD      Pain Location Hip  -SD      Pain Orientation Right  -SD      Pain Intervention(s) Repositioned;Ambulation/increased activity  -SD      Response to Interventions Tolerated  -SD      Recorded by [SD] Kelle Edwards OT [HE] Farhan Ontiveros     ROM (Range of Motion)    General ROM  no range of motion deficits identified  -HE     Recorded by  [HE] Farhan Ontiveros     Bed Mobility, Assessment/Treatment    Bed Mobility, Assistive Device  bed rails;head of bed elevated  -HE     Bed Mob, Supine to Sit, [place]  independent  -HE     Recorded by  [HE] Farhan Ontiveros     Transfer Assessment/Treatment    Transfers, Sit-Stand [place] supervision required  -SD contact guard assist;verbal cues required  -HE     Transfers, Stand-Sit [place] supervision required  -SD verbal cues required  -HE     Transfers, Sit-Stand-Sit, Assist Device --   patient completed 10 sit to stands with SBA   -SD rolling walker  -HE     Recorded by [SD] Kelle Edwards OT [HE] Farhan Ontiveros     Functional Mobility    Functional Mobility- Ind. Level contact guard assist  -SD verbal cues required;contact guard assist  -     Functional Mobility- Device rolling walker  -SD rolling walker  -HE      Functional Mobility-Distance (Feet) 164  -  -HE     Functional Mobility- Safety Issues  supplemental O2  -HE     Functional Mobility- Comment Had to turn around c/o pain in right side and hip   -SD      Recorded by [SD] Kelle Edwards OT [HE] Farhan Ontiveros     Lower Body Dressing Assessment/Training    LB Dressing Assess/Train, [place]  verbal cues required;contact guard assist  -HE     Recorded by  [HE] Farhan Ontiveros     Therapy Exercises    Bilateral Upper Extremity AROM:;15 reps;sitting;standing;elbow flexion/extension;shoulder abduction/adduction;shoulder extension/flexion  -SD      BUE Resistance theraband  -SD manual resistance- moderate   10 reps  -HE     Trunk Exercises 15 reps;standing;trunk rotation  -SD      Recorded by [SD] Kelle Edwards OT [HE] Farhan Ontiveros     Positioning and Restraints    Pre-Treatment Position sitting in chair/recliner  -SD in bed  -HE     Post Treatment Position chair  -SD      In Chair reclined;call light within reach;encouraged to call for assist  -SD reclined;call light within reach  -HE     Recorded by [SD] Kelle Edwards OT [HE] Farhan Ontiveros       User Key  (r) = Recorded By, (t) = Taken By, (c) = Cosigned By    Initials Name Effective Dates    HE Farhan Ontiveros 06/26/17 -     SD Kelle Edwards OT 06/30/17 -                 OT Goals       09/15/17 1308 09/13/17 1251 09/12/17 1554    Transfer Training OT LTG    Transfer Training OT LTG, Date Goal Reviewed 09/15/17  -SD 09/13/17  -HE 09/12/17  -SD    Transfer Training OT LTG, Outcome goal ongoing  -SD goal ongoing  -HE goal ongoing  -SD    Strength OT LTG    Strength Goal OT LTG, Date Goal Reviewed   09/12/17  -SD    Strength Goal OT LTG, Outcome   goal met  -SD    LB Dressing OT LTG    LB Dressing Goal OT LTG, Date Goal Reviewed 09/15/17  -SD 09/13/17  -HE 09/12/17  -SD    LB Dressing Goal OT LTG, Outcome goal ongoing  -SD goal ongoing  -HE goal ongoing  -SD    Functional Mobility OT LTG    Functional  Mobility Goal OT LTG, Date Goal Reviewed 09/15/17  -SD 09/13/17  -HE 09/12/17  -SD    Functional Mobility Goal OT LTG, Outcome goal ongoing  -SD goal ongoing  -HE goal ongoing  -SD      09/11/17 1331          Transfer Training OT LTG    Transfer Training OT LTG, Date Established 09/11/17  -      Transfer Training OT LTG, Time to Achieve by discharge  -      Transfer Training OT LTG, Activity Type sit to stand/stand to sit  -      Transfer Training OT LTG, [place] Level independent  -      Transfer Training OT LTG, Outcome goal ongoing  -      Strength OT LTG    Strength Goal OT LTG, Date Established 09/11/17  -      Strength Goal OT LTG, Time to Achieve by discharge  -      Strength Goal OT LTG, Functional Goal Pt will participate in BUE strengthening ex 15 reps each using theraband for resistance.  -      Strength Goal OT LTG, Outcome goal ongoing  -      LB Dressing OT LTG    LB Dressing Goal OT LTG, Date Established 09/11/17  -      LB Dressing Goal OT LTG, Time to Achieve by discharge  -      LB Dressing Goal OT LTG, [place] Level set up required  -      LB Dressing Goal OT LTG, Outcome goal ongoing  -      Functional Mobility OT LTG    Functional Mobility Goal OT LTG, Date Established 09/11/17  -      Functional Mobility Goal OT LTG, Time to Achieve by discharge  -      Functional Mobility Goal OT LTG, [place] Level independent with device  -      Functional Mobility Goal OT LTG, Assist Device rolling walker  -      Functional Mobility Goal OT LTG, Distance to Achieve in hallway  -      Functional Mobility Goal OT LTG, Additional Goal Pt will walk 350' with sba using RW and O2  -      Functional Mobility Goal OT LTG, Outcome goal ongoing  -        User Key  (r) = Recorded By, (t) = Taken By, (c) = Cosigned By    Initials Name Provider Type     Akilah Gutierrez Occupational Therapist    SAMIA Ontiveros Occupational Therapist    TONY Edwards, OT  Occupational Therapist          Occupational Therapy Education     Title: PT OT SLP Therapies (Active)     Topic: Occupational Therapy (Active)     Point: ADL training (Done)    Description: Instruct learner(s) on proper safety adaptation and remediation techniques during self care or transfers.   Instruct in proper use of assistive devices.    Learning Progress Summary    Learner Readiness Method Response Comment Documented by Status   Patient Acceptance E VU Role of OT/POC AH 09/11/17 1331 Done    Acceptance E VU talked with pt about safety with mobility. Advised to get assist from nursing when walking secondary to lines and O2. J 09/09/17 1431 Done    Acceptance E VU,NR LACE TEACHING  ROOM ORIENTATION  SMOKING CESSATION TN 09/09/17 0215 Done   Family Acceptance E VU,NR LACE TEACHING  ROOM ORIENTATION  SMOKING CESSATION TN 09/09/17 0215 Done               Point: Home exercise program (Done)    Description: Instruct learner(s) on appropriate technique for monitoring, assisting and/or progressing therapeutic exercises/activities.    Learning Progress Summary    Learner Readiness Method Response Comment Documented by Status   Patient Acceptance E VU Continued need for strengthening to facilitate return to PLOF SD 09/15/17 1310 Done    Acceptance E VU talked with pt about safety with mobility. Advised to get assist from nursing when walking secondary to lines and O2. J 09/09/17 1431 Done    Acceptance E VU,NR LACE TEACHING  ROOM ORIENTATION  SMOKING CESSATION TN 09/09/17 0215 Done   Family Acceptance E VU,NR LACE TEACHING  ROOM ORIENTATION  SMOKING CESSATION TN 09/09/17 0215 Done               Point: Precautions (Done)    Description: Instruct learner(s) on prescribed precautions during self-care and functional transfers.    Learning Progress Summary    Learner Readiness Method Response Comment Documented by Status   Patient Acceptance E VU OT instructed on PLB and proper body mechanics to decrease SOB and prevent  unsafe fatigue. Further instruction required. HE 09/13/17 1300 Done    Acceptance E VU talked with pt about safety with mobility. Advised to get assist from nursing when walking secondary to lines and O2. JG 09/09/17 1431 Done    Acceptance E VU,NR LACE TEACHING  ROOM ORIENTATION  SMOKING CESSATION TN 09/09/17 0215 Done   Family Acceptance E VU,NR LACE TEACHING  ROOM ORIENTATION  SMOKING CESSATION TN 09/09/17 0215 Done                      User Key     Initials Effective Dates Name Provider Type Discipline     04/06/17 -  Sarkis Brennan, WILFRED Physical Therapist PT     10/26/16 -  Akilah Gutierrez Occupational Therapist OT    TN 10/26/16 -  Toshia Chang, RN Registered Nurse Nurse     06/26/17 -  Farhan Ontiveros Occupational Therapist OT    SD 06/30/17 -  Kelle Edwards OT Occupational Therapist OT                  OT Recommendation and Plan  Anticipated Discharge Disposition: home  Planned Therapy Interventions: ADL retraining, strengthening, transfer training, home exercise program, energy conservation  Therapy Frequency: 3-5 times/wk  Plan of Care Review  Plan Of Care Reviewed With: patient  Progress: improving  Outcome Summary/Follow up Plan: OT tx completed. Patient c/o pain 2/2 kidney stone limiting distance he walked today to 164. Completed sit to stand transfers x 10 for strengthening and completed UB ther ex and trunk exercises in sitting and standing to challenge endurance and balance. Continue OT POC        Outcome Measures       09/15/17 1126 09/13/17 0935       How much help from another is currently needed...    Putting on and taking off regular lower body clothing? 3  -SD 3  -HE     Bathing (including washing, rinsing, and drying) 3  -SD 3  -HE     Toileting (which includes using toilet bed pan or urinal) 3  -SD 3  -HE     Putting on and taking off regular upper body clothing 4  -SD 4  -HE     Taking care of personal grooming (such as brushing teeth) 4  -SD 4  -HE     Eating meals 4  -SD 4  -HE      Score 21  -SD 21  -HE     Functional Assessment    Outcome Measure Options AM-PAC 6 Clicks Daily Activity (OT)  -SD AM-PAC 6 Clicks Daily Activity (OT)  -HE       User Key  (r) = Recorded By, (t) = Taken By, (c) = Cosigned By    Initials Name Provider Type     Farhan Ontiveros Occupational Therapist    TONY Edwards, OT Occupational Therapist           Time Calculation:         Time Calculation- OT       09/15/17 1311          Time Calculation- OT    OT Start Time 1126  -SD      Total Timed Code Minutes- OT 26 minute(s)  -SD      OT Received On 09/15/17  -SD      OT Goal Re-Cert Due Date 09/21/17  -SD        User Key  (r) = Recorded By, (t) = Taken By, (c) = Cosigned By    Initials Name Provider Type    TONY Edwards OT Occupational Therapist           Therapy Charges for Today     Code Description Service Date Service Provider Modifiers Qty    25086975275  OT THERAPEUTIC ACT EA 15 MIN 9/15/2017 Kelle [place], OT GO 1    [id number]  OT THER PROC EA 15 MIN 9/15/2017 Kelle [place], OT GO 1               Kelle Edwards, OT  9/15/2017

## 2017-09-15 NOTE — PLAN OF CARE
Problem: Patient Care Overview (Adult)  Goal: Plan of Care Review  Outcome: Ongoing (interventions implemented as appropriate)    09/15/17 0510   Coping/Psychosocial Response Interventions   Plan Of Care Reviewed With patient   Patient Care Overview   Progress improving   Outcome Evaluation   Outcome Summary/Follow up Plan Patient is still coughing yellow/blood tinged sputum but the patient states it has slowed down. The patient is still receiving IV antibiotics, steroids, and neb treatments. The patient has complained of hip pain once this shift but it was relieved with morphine. Will continue to monitor.

## 2017-09-16 LAB
ANION GAP SERPL CALCULATED.3IONS-SCNC: 11.5 MMOL/L
BASOPHILS # BLD AUTO: 0.01 10*3/MM3 (ref 0–0.2)
BASOPHILS NFR BLD AUTO: 0.1 % (ref 0–2.5)
BUN BLD-MCNC: 43 MG/DL (ref 7–20)
BUN/CREAT SERPL: 43 (ref 6.3–21.9)
CALCIUM SPEC-SCNC: 9 MG/DL (ref 8.4–10.2)
CHLORIDE SERPL-SCNC: 99 MMOL/L (ref 98–107)
CO2 SERPL-SCNC: 32 MMOL/L (ref 26–30)
CREAT BLD-MCNC: 1 MG/DL (ref 0.6–1.3)
DEPRECATED RDW RBC AUTO: 38.9 FL (ref 37–54)
EOSINOPHIL # BLD AUTO: 0 10*3/MM3 (ref 0–0.7)
EOSINOPHIL NFR BLD AUTO: 0 % (ref 0–7)
ERYTHROCYTE [DISTWIDTH] IN BLOOD BY AUTOMATED COUNT: 12.1 % (ref 11.5–14.5)
GFR SERPL CREATININE-BSD FRML MDRD: 75 ML/MIN/1.73
GLUCOSE BLD-MCNC: 110 MG/DL (ref 74–98)
GLUCOSE BLDC GLUCOMTR-MCNC: 109 MG/DL (ref 70–130)
HCT VFR BLD AUTO: 34 % (ref 42–52)
HGB BLD-MCNC: 11.1 G/DL (ref 14–18)
IMM GRANULOCYTES # BLD: 0.21 10*3/MM3 (ref 0–0.06)
IMM GRANULOCYTES NFR BLD: 1.3 % (ref 0–0.6)
LYMPHOCYTES # BLD AUTO: 0.58 10*3/MM3 (ref 0.6–3.4)
LYMPHOCYTES NFR BLD AUTO: 3.5 % (ref 10–50)
MCH RBC QN AUTO: 29.3 PG (ref 27–31)
MCHC RBC AUTO-ENTMCNC: 32.6 G/DL (ref 30–37)
MCV RBC AUTO: 89.7 FL (ref 80–94)
MONOCYTES # BLD AUTO: 0.73 10*3/MM3 (ref 0–0.9)
MONOCYTES NFR BLD AUTO: 4.4 % (ref 0–12)
NEUTROPHILS # BLD AUTO: 15.2 10*3/MM3 (ref 2–6.9)
NEUTROPHILS NFR BLD AUTO: 90.7 % (ref 37–80)
NRBC BLD MANUAL-RTO: 0 /100 WBC (ref 0–0)
PLATELET # BLD AUTO: 229 10*3/MM3 (ref 130–400)
PMV BLD AUTO: 9.2 FL (ref 6–12)
POTASSIUM BLD-SCNC: 4.5 MMOL/L (ref 3.5–5.1)
RBC # BLD AUTO: 3.79 10*6/MM3 (ref 4.7–6.1)
SODIUM BLD-SCNC: 138 MMOL/L (ref 137–145)
WBC NRBC COR # BLD: 16.73 10*3/MM3 (ref 4.8–10.8)

## 2017-09-16 PROCEDURE — 94799 UNLISTED PULMONARY SVC/PX: CPT

## 2017-09-16 PROCEDURE — 82962 GLUCOSE BLOOD TEST: CPT

## 2017-09-16 PROCEDURE — 87070 CULTURE OTHR SPECIMN AEROBIC: CPT | Performed by: NURSE PRACTITIONER

## 2017-09-16 PROCEDURE — 87205 SMEAR GRAM STAIN: CPT | Performed by: NURSE PRACTITIONER

## 2017-09-16 PROCEDURE — 25010000002 CEFTRIAXONE: Performed by: INTERNAL MEDICINE

## 2017-09-16 PROCEDURE — 87077 CULTURE AEROBIC IDENTIFY: CPT | Performed by: NURSE PRACTITIONER

## 2017-09-16 PROCEDURE — 99232 SBSQ HOSP IP/OBS MODERATE 35: CPT | Performed by: INTERNAL MEDICINE

## 2017-09-16 PROCEDURE — 25010000002 ENOXAPARIN PER 10 MG: Performed by: INTERNAL MEDICINE

## 2017-09-16 PROCEDURE — 87186 SC STD MICRODIL/AGAR DIL: CPT | Performed by: NURSE PRACTITIONER

## 2017-09-16 PROCEDURE — 25010000002 METHYLPREDNISOLONE PER 40 MG: Performed by: INTERNAL MEDICINE

## 2017-09-16 PROCEDURE — 80048 BASIC METABOLIC PNL TOTAL CA: CPT | Performed by: NURSE PRACTITIONER

## 2017-09-16 PROCEDURE — 87106 FUNGI IDENTIFICATION YEAST: CPT | Performed by: NURSE PRACTITIONER

## 2017-09-16 PROCEDURE — 85025 COMPLETE CBC W/AUTO DIFF WBC: CPT | Performed by: NURSE PRACTITIONER

## 2017-09-16 RX ADMIN — LOSARTAN POTASSIUM 100 MG: 50 TABLET, FILM COATED ORAL at 09:37

## 2017-09-16 RX ADMIN — IPRATROPIUM BROMIDE AND ALBUTEROL SULFATE 3 ML: .5; 3 SOLUTION RESPIRATORY (INHALATION) at 00:42

## 2017-09-16 RX ADMIN — ATORVASTATIN CALCIUM 40 MG: 40 TABLET, FILM COATED ORAL at 09:36

## 2017-09-16 RX ADMIN — GUAIFENESIN AND PSEUDOEPHEDRINE HYDROCHLORIDE 1 TABLET: 600; 60 TABLET, EXTENDED RELEASE ORAL at 09:37

## 2017-09-16 RX ADMIN — TAMSULOSIN HYDROCHLORIDE 0.4 MG: 0.4 CAPSULE ORAL at 20:25

## 2017-09-16 RX ADMIN — IPRATROPIUM BROMIDE AND ALBUTEROL SULFATE 3 ML: .5; 3 SOLUTION RESPIRATORY (INHALATION) at 06:59

## 2017-09-16 RX ADMIN — METHYLPREDNISOLONE SODIUM SUCCINATE 40 MG: 40 INJECTION, POWDER, FOR SOLUTION INTRAMUSCULAR; INTRAVENOUS at 05:27

## 2017-09-16 RX ADMIN — TRAMADOL HYDROCHLORIDE 50 MG: 50 TABLET, COATED ORAL at 20:25

## 2017-09-16 RX ADMIN — IPRATROPIUM BROMIDE AND ALBUTEROL SULFATE 3 ML: .5; 3 SOLUTION RESPIRATORY (INHALATION) at 12:38

## 2017-09-16 RX ADMIN — ACETYLCYSTEINE 800 MG: 200 SOLUTION ORAL; RESPIRATORY (INHALATION) at 16:42

## 2017-09-16 RX ADMIN — ARFORMOTEROL TARTRATE 15 MCG: 15 SOLUTION RESPIRATORY (INHALATION) at 19:21

## 2017-09-16 RX ADMIN — SPIRONOLACTONE 12.5 MG: 25 TABLET, FILM COATED ORAL at 09:37

## 2017-09-16 RX ADMIN — CARVEDILOL 6.25 MG: 6.25 TABLET, FILM COATED ORAL at 17:26

## 2017-09-16 RX ADMIN — IPRATROPIUM BROMIDE AND ALBUTEROL SULFATE 3 ML: .5; 3 SOLUTION RESPIRATORY (INHALATION) at 19:39

## 2017-09-16 RX ADMIN — FINASTERIDE 5 MG: 5 TABLET, FILM COATED ORAL at 09:36

## 2017-09-16 RX ADMIN — ESCITALOPRAM OXALATE 10 MG: 10 TABLET ORAL at 20:25

## 2017-09-16 RX ADMIN — CARVEDILOL 6.25 MG: 6.25 TABLET, FILM COATED ORAL at 09:37

## 2017-09-16 RX ADMIN — Medication 10 ML: at 23:15

## 2017-09-16 RX ADMIN — GUAIFENESIN AND PSEUDOEPHEDRINE HYDROCHLORIDE 1 TABLET: 600; 60 TABLET, EXTENDED RELEASE ORAL at 20:25

## 2017-09-16 RX ADMIN — AMLODIPINE BESYLATE 5 MG: 5 TABLET ORAL at 09:36

## 2017-09-16 RX ADMIN — BENZONATATE 200 MG: 100 CAPSULE, LIQUID FILLED ORAL at 20:25

## 2017-09-16 RX ADMIN — ACETYLCYSTEINE 800 MG: 200 SOLUTION ORAL; RESPIRATORY (INHALATION) at 07:01

## 2017-09-16 RX ADMIN — BUDESONIDE 0.5 MG: 0.5 INHALANT RESPIRATORY (INHALATION) at 06:59

## 2017-09-16 RX ADMIN — METHYLPREDNISOLONE SODIUM SUCCINATE 40 MG: 40 INJECTION, POWDER, FOR SOLUTION INTRAMUSCULAR; INTRAVENOUS at 17:26

## 2017-09-16 RX ADMIN — Medication 10 ML: at 20:25

## 2017-09-16 RX ADMIN — METHYLPREDNISOLONE SODIUM SUCCINATE 40 MG: 40 INJECTION, POWDER, FOR SOLUTION INTRAMUSCULAR; INTRAVENOUS at 11:13

## 2017-09-16 RX ADMIN — HYDROCODONE POLISTIREX AND CHLORPHENIRAMINE POLISTIREX 5 ML: 10; 8 SUSPENSION, EXTENDED RELEASE ORAL at 09:37

## 2017-09-16 RX ADMIN — METHYLPREDNISOLONE SODIUM SUCCINATE 40 MG: 40 INJECTION, POWDER, FOR SOLUTION INTRAMUSCULAR; INTRAVENOUS at 23:15

## 2017-09-16 RX ADMIN — ACETYLCYSTEINE 600 MG: 200 SOLUTION ORAL; RESPIRATORY (INHALATION) at 00:43

## 2017-09-16 RX ADMIN — ALPRAZOLAM 0.5 MG: 0.5 TABLET ORAL at 23:50

## 2017-09-16 RX ADMIN — HYDROCODONE POLISTIREX AND CHLORPHENIRAMINE POLISTIREX 5 ML: 10; 8 SUSPENSION, EXTENDED RELEASE ORAL at 20:25

## 2017-09-16 RX ADMIN — CLOPIDOGREL BISULFATE 75 MG: 75 TABLET ORAL at 09:36

## 2017-09-16 RX ADMIN — BUDESONIDE 0.5 MG: 0.5 INHALANT RESPIRATORY (INHALATION) at 19:21

## 2017-09-16 RX ADMIN — ENOXAPARIN SODIUM 40 MG: 40 INJECTION SUBCUTANEOUS at 09:36

## 2017-09-16 RX ADMIN — CEFTRIAXONE 1 G: 1 INJECTION, POWDER, FOR SOLUTION INTRAMUSCULAR; INTRAVENOUS at 15:55

## 2017-09-16 NOTE — PLAN OF CARE
Problem: Patient Care Overview (Adult)  Goal: Plan of Care Review  Outcome: Ongoing (interventions implemented as appropriate)    09/16/17 0435   Coping/Psychosocial Response Interventions   Plan Of Care Reviewed With patient   Patient Care Overview   Progress improving       Goal: Adult Individualization and Mutuality  Outcome: Ongoing (interventions implemented as appropriate)  Goal: Discharge Needs Assessment  Outcome: Ongoing (interventions implemented as appropriate)

## 2017-09-16 NOTE — PROGRESS NOTES
[facility]    Name:  Thurman Mendel Parsons   Age:  67 y.o.  Sex:  male  :  1950  MRN:  [id number]   Visit Number:  [id number]  Admission Date:  2017  Date Of Service:  17  Primary Care Physician:  Miguel Rojas MD     LOS: 8 days :  Patient Care Team:  Miguel Rojas MD as PCP - General  Miguel Rojas MD as PCP - Family Medicine:      Subjective / Interval History:     Patient's chart reviewed and events noted to since admission.  Has been admitted with bibasilar pneumonia with hypoxic respiratory failure and COPD exacerbation.  He has been on IV steroids and antibiotic and BiPAP and has been gradually improving .  Compliance with BiPAP has been stressed and the he is weak and has agreed to go for rehabilitation on Monday.      Vital Signs:    Temp:  [97.9 °F (36.6 °C)-98.7 °F (37.1 °C)] 97.9 °F (36.6 °C)  Heart Rate:  [73-98] 98  Resp:  [18-20] 20  BP: (112-167)/(67-98) 135/98    Intake and output:    I/O last 3 completed shifts:  In: 840 [P.O.:840]  Out: 1290 [Urine:1290]  I/O this shift:  In: 240 [P.O.:240]  Out: 200 [Urine:200]    Physical Examination:    General Appearance:    Alert and cooperative, not in any acute distress.   Head:    Atraumatic and normocephalic, without obvious abnormality.   Eyes:            PERRLA,  No pallor. Extra-occular movements are within normal limits.   Neck:   Supple,  No lymphglands, no bruit   Lungs:     Chest shape is normal. Breath sounds heard bilaterally equally.  No crackles or wheezing.     Heart:    Normal S1 and S2, no murmur,  No JVD   Abdomen:     Normal bowel sounds, no masses, no organomegaly. Soft        non-tender, no guarding, no rebound                tenderness   Extremities:   Moves all extremities well, no edema, no cyanosis,    Skin:   No  bruising or rash.   Neurologic:   Grossly non focal and moves all extrimities equally.    Laboratory results:    Results from last 7 days  Lab Units  09/16/17  0553 09/14/17  0616 09/12/17  0630   SODIUM mmol/L 138 140 140   POTASSIUM mmol/L 4.5 4.3 4.4   CHLORIDE mmol/L 99 99 100   CO2 mmol/L 32.0* 34.0* 35.0*   BUN mg/dL 43* 34* 29*   CREATININE mg/dL 1.00 0.90 0.80   CALCIUM mg/dL 9.0 9.0 8.8   GLUCOSE mg/dL 110* 121* 129*       Results from last 7 days  Lab Units 09/16/17  0553 09/14/17  0616 09/13/17  1350   WBC 10*3/mm3 16.73* 14.12* 14.45*   HEMOGLOBIN g/dL 11.1* 11.9* 12.1*   HEMATOCRIT % 34.0* 37.2* 38.4*   PLATELETS 10*3/mm3 229 228 215           Results from last 7 days  Lab Units 09/10/17  0645   TROPONIN I ng/mL 0.054*           Radiology results:    Imaging Results (last 24 hours)     ** No results found for the last 24 hours. **          I have reviewed the patient's radiology reports.    Medication Review:     I have reviewed the patients active and prn medications.     Assessment:    Active Problems:    Pneumonia of right lower lobe due to infectious organism        Chronic hypoxic respiratory failure  COPD exacerbation  Diabetes type 2  CK D stage III  Coronary artery disease  Obstructive sleep apnea on BiPAP      Plan:    Patient with pneumonia COPD hypoxic respiratory failure is a clinically stable on IV antibiotic her BiPAP and bronchodilators would continue that and the once stable and she does want to go for rehabilitation and will probably be seen Monday when bed available.    Jermaine Perez MD  09/16/17  12:14 PM      Please note that portions of this note may have been completed with a voice recognition program. Efforts were made to edit the dictations, but occasionally words are mistranscribed.

## 2017-09-16 NOTE — PLAN OF CARE
Problem: NPPV/CPAP (Adult)  Goal: Signs and Symptoms of Listed Potential Problems Will be Absent or Manageable (NPPV/CPAP)  Outcome: Ongoing (interventions implemented as appropriate)    09/16/17 4713   NPPV/CPAP   Problems Assessed (NPPV/CPAP) all   Problems Present (NPPV/CPAP) none

## 2017-09-17 PROBLEM — R53.81 PHYSICAL DECONDITIONING: Status: ACTIVE | Noted: 2017-09-17

## 2017-09-17 PROBLEM — Z74.09 IMPAIRED MOBILITY AND ADLS: Status: ACTIVE | Noted: 2017-09-17

## 2017-09-17 PROBLEM — Z78.9 IMPAIRED MOBILITY AND ADLS: Status: ACTIVE | Noted: 2017-09-17

## 2017-09-17 LAB
GLUCOSE BLDC GLUCOMTR-MCNC: 106 MG/DL (ref 70–130)
GLUCOSE BLDC GLUCOMTR-MCNC: 107 MG/DL (ref 70–130)
GLUCOSE BLDC GLUCOMTR-MCNC: 117 MG/DL (ref 70–130)
GLUCOSE BLDC GLUCOMTR-MCNC: 186 MG/DL (ref 70–130)
GLUCOSE BLDC GLUCOMTR-MCNC: 190 MG/DL (ref 70–130)
GLUCOSE BLDC GLUCOMTR-MCNC: 207 MG/DL (ref 70–130)

## 2017-09-17 PROCEDURE — 63710000001 INSULIN ASPART PER 5 UNITS: Performed by: INTERNAL MEDICINE

## 2017-09-17 PROCEDURE — 25010000002 ENOXAPARIN PER 10 MG: Performed by: INTERNAL MEDICINE

## 2017-09-17 PROCEDURE — 82962 GLUCOSE BLOOD TEST: CPT

## 2017-09-17 PROCEDURE — 94799 UNLISTED PULMONARY SVC/PX: CPT

## 2017-09-17 PROCEDURE — 94640 AIRWAY INHALATION TREATMENT: CPT

## 2017-09-17 PROCEDURE — 99233 SBSQ HOSP IP/OBS HIGH 50: CPT | Performed by: FAMILY MEDICINE

## 2017-09-17 PROCEDURE — 25010000002 METHYLPREDNISOLONE PER 40 MG: Performed by: FAMILY MEDICINE

## 2017-09-17 PROCEDURE — 25010000002 METHYLPREDNISOLONE PER 40 MG: Performed by: INTERNAL MEDICINE

## 2017-09-17 RX ORDER — CEFDINIR 300 MG/1
300 CAPSULE ORAL EVERY 12 HOURS SCHEDULED
Status: DISCONTINUED | OUTPATIENT
Start: 2017-09-17 | End: 2017-09-18 | Stop reason: HOSPADM

## 2017-09-17 RX ORDER — METHYLPREDNISOLONE SODIUM SUCCINATE 40 MG/ML
40 INJECTION, POWDER, LYOPHILIZED, FOR SOLUTION INTRAMUSCULAR; INTRAVENOUS EVERY 8 HOURS
Status: DISCONTINUED | OUTPATIENT
Start: 2017-09-17 | End: 2017-09-18 | Stop reason: HOSPADM

## 2017-09-17 RX ORDER — DIPHENHYDRAMINE HYDROCHLORIDE AND LIDOCAINE HYDROCHLORIDE AND ALUMINUM HYDROXIDE AND MAGNESIUM HYDRO
KIT EVERY 8 HOURS
Status: DISCONTINUED | OUTPATIENT
Start: 2017-09-17 | End: 2017-09-18 | Stop reason: HOSPADM

## 2017-09-17 RX ORDER — ACETYLCYSTEINE 200 MG/ML
600 SOLUTION ORAL; RESPIRATORY (INHALATION)
Status: DISCONTINUED | OUTPATIENT
Start: 2017-09-17 | End: 2017-09-18 | Stop reason: HOSPADM

## 2017-09-17 RX ADMIN — FINASTERIDE 5 MG: 5 TABLET, FILM COATED ORAL at 08:12

## 2017-09-17 RX ADMIN — DIPHENHYDRAMINE HYDROCHLORIDE AND LIDOCAINE HYDROCHLORIDE AND ALUMINUM HYDROXIDE AND MAGNESIUM HYDRO: KIT at 23:27

## 2017-09-17 RX ADMIN — HYDROCODONE POLISTIREX AND CHLORPHENIRAMINE POLISTIREX 5 ML: 10; 8 SUSPENSION, EXTENDED RELEASE ORAL at 21:19

## 2017-09-17 RX ADMIN — BUDESONIDE 0.5 MG: 0.5 INHALANT RESPIRATORY (INHALATION) at 06:53

## 2017-09-17 RX ADMIN — METHYLPREDNISOLONE SODIUM SUCCINATE 40 MG: 40 INJECTION, POWDER, FOR SOLUTION INTRAMUSCULAR; INTRAVENOUS at 05:00

## 2017-09-17 RX ADMIN — INSULIN ASPART 2 UNITS: 100 INJECTION, SOLUTION INTRAVENOUS; SUBCUTANEOUS at 06:50

## 2017-09-17 RX ADMIN — GUAIFENESIN AND PSEUDOEPHEDRINE HYDROCHLORIDE 1 TABLET: 600; 60 TABLET, EXTENDED RELEASE ORAL at 21:19

## 2017-09-17 RX ADMIN — Medication 10 ML: at 20:46

## 2017-09-17 RX ADMIN — ACETYLCYSTEINE 600 MG: 200 SOLUTION ORAL; RESPIRATORY (INHALATION) at 19:03

## 2017-09-17 RX ADMIN — METHYLPREDNISOLONE SODIUM SUCCINATE 40 MG: 40 INJECTION, POWDER, FOR SOLUTION INTRAMUSCULAR; INTRAVENOUS at 20:46

## 2017-09-17 RX ADMIN — HYDROCODONE POLISTIREX AND CHLORPHENIRAMINE POLISTIREX 5 ML: 10; 8 SUSPENSION, EXTENDED RELEASE ORAL at 09:25

## 2017-09-17 RX ADMIN — ACETYLCYSTEINE 600 MG: 200 SOLUTION ORAL; RESPIRATORY (INHALATION) at 06:54

## 2017-09-17 RX ADMIN — CLOPIDOGREL BISULFATE 75 MG: 75 TABLET ORAL at 08:12

## 2017-09-17 RX ADMIN — DIPHENHYDRAMINE HYDROCHLORIDE AND LIDOCAINE HYDROCHLORIDE AND ALUMINUM HYDROXIDE AND MAGNESIUM HYDRO: KIT at 16:39

## 2017-09-17 RX ADMIN — ARFORMOTEROL TARTRATE 15 MCG: 15 SOLUTION RESPIRATORY (INHALATION) at 19:03

## 2017-09-17 RX ADMIN — AMLODIPINE BESYLATE 5 MG: 5 TABLET ORAL at 08:13

## 2017-09-17 RX ADMIN — CARVEDILOL 6.25 MG: 6.25 TABLET, FILM COATED ORAL at 08:12

## 2017-09-17 RX ADMIN — ACETYLCYSTEINE 600 MG: 200 SOLUTION ORAL; RESPIRATORY (INHALATION) at 00:54

## 2017-09-17 RX ADMIN — IPRATROPIUM BROMIDE AND ALBUTEROL SULFATE 3 ML: .5; 3 SOLUTION RESPIRATORY (INHALATION) at 19:02

## 2017-09-17 RX ADMIN — CEFDINIR 300 MG: 300 CAPSULE ORAL at 14:21

## 2017-09-17 RX ADMIN — IPRATROPIUM BROMIDE AND ALBUTEROL SULFATE 3 ML: .5; 3 SOLUTION RESPIRATORY (INHALATION) at 12:49

## 2017-09-17 RX ADMIN — BUDESONIDE 0.5 MG: 0.5 INHALANT RESPIRATORY (INHALATION) at 19:03

## 2017-09-17 RX ADMIN — ARFORMOTEROL TARTRATE 15 MCG: 15 SOLUTION RESPIRATORY (INHALATION) at 07:00

## 2017-09-17 RX ADMIN — TAMSULOSIN HYDROCHLORIDE 0.4 MG: 0.4 CAPSULE ORAL at 21:19

## 2017-09-17 RX ADMIN — SPIRONOLACTONE 12.5 MG: 25 TABLET, FILM COATED ORAL at 08:12

## 2017-09-17 RX ADMIN — ATORVASTATIN CALCIUM 40 MG: 40 TABLET, FILM COATED ORAL at 08:12

## 2017-09-17 RX ADMIN — CARVEDILOL 6.25 MG: 6.25 TABLET, FILM COATED ORAL at 17:04

## 2017-09-17 RX ADMIN — METHYLPREDNISOLONE SODIUM SUCCINATE 40 MG: 40 INJECTION, POWDER, FOR SOLUTION INTRAMUSCULAR; INTRAVENOUS at 11:47

## 2017-09-17 RX ADMIN — GUAIFENESIN AND PSEUDOEPHEDRINE HYDROCHLORIDE 1 TABLET: 600; 60 TABLET, EXTENDED RELEASE ORAL at 08:13

## 2017-09-17 RX ADMIN — Medication 10 ML: at 05:00

## 2017-09-17 RX ADMIN — CEFDINIR 300 MG: 300 CAPSULE ORAL at 21:20

## 2017-09-17 RX ADMIN — ESCITALOPRAM OXALATE 10 MG: 10 TABLET ORAL at 21:19

## 2017-09-17 RX ADMIN — INSULIN ASPART 3 UNITS: 100 INJECTION, SOLUTION INTRAVENOUS; SUBCUTANEOUS at 11:47

## 2017-09-17 RX ADMIN — IPRATROPIUM BROMIDE AND ALBUTEROL SULFATE 3 ML: .5; 3 SOLUTION RESPIRATORY (INHALATION) at 06:53

## 2017-09-17 RX ADMIN — ENOXAPARIN SODIUM 40 MG: 40 INJECTION SUBCUTANEOUS at 09:24

## 2017-09-17 RX ADMIN — LOSARTAN POTASSIUM 100 MG: 50 TABLET, FILM COATED ORAL at 08:13

## 2017-09-17 RX ADMIN — IPRATROPIUM BROMIDE AND ALBUTEROL SULFATE 3 ML: .5; 3 SOLUTION RESPIRATORY (INHALATION) at 00:54

## 2017-09-17 NOTE — PROGRESS NOTES
[facility]    Name:  Thurman Mendel Parsons   Age:  67 y.o.  Sex:  male  :  1950  MRN:  [id number]   Visit Number:  [id number]  Admission Date:  2017  Date Of Service:  17  Primary Care Physician:  Miguel Rojas MD     LOS: 9 days :  Patient Care Team:  Miguel Rojas MD as PCP - General  Miguel Rojas MD as PCP - Family Medicine:    Chief Complaint:      Acute on Chronic Resp Failure with hypoxia/hypercapnia    Subjective / Interval History:     I have reviewed labs/imaging/records from this hospitalization, including ER staff and admitting/attending physicians H/P's and progress notes to establish a comprehensive understanding of this patient's clinical hospital course, as well as to establish a transition of care appropriately.    Pt is a 66 yo male admitted for acute on chronic resp failure with hypoxia and hypercapnia; treated for presumed bibasilar pneumonia with rocephin/zithromax, as well as IV solumedrol and aggressive pulm care including duonebs and BiPAP; has progressively improved, and steroid dosing has been de-escalated, as well as finished course of zithromax; tolerated rocephin IV as well, and addition of mucomyst for mucolytic effects has helped; globally deconditioned, and eval for SNF placement for rehab to home program; approved for [place], and will be available to go as of Monday if clinically able.    Pt denies F/C; good UOP, no hematuria; good po intake, no apprec N/V; no dysphagia; denies CP/palp/vertigo/orthopnea; tolerating NC oxygen supplementation.    Review of Systems:     General ROS: Patient denies any fevers, chills or loss of consciousness.  Respiratory ROS: cough with phlegm prod and no hemoptysis; occ wheezes and diff clearing phlegm.  Cardiovascular ROS: Denies chest pain or palpitations. No history of exertional chest pain.  Gastrointestinal ROS: Denies nausea and vomiting. Denies any abdominal pain. No  diarrhea.  Neurological ROS: Gen weakness. No loss of consciousness. Denies any numbness. Denies neck pain.  Dermatological ROS: Denies any redness or pruritis.    Vital Signs:    Temp:  [97.2 °F (36.2 °C)-98.4 °F (36.9 °C)] 98.1 °F (36.7 °C)  Heart Rate:  [65-90] 75  Resp:  [12-22] 18  BP: (111-148)/(50-76) 120/60    Intake and output:    I/O last 3 completed shifts:  In: 1200 [P.O.:1200]  Out: 2935 [Urine:2935]  I/O this shift:  In: 600 [P.O.:600]  Out: 400 [Urine:400]    Physical Examination:    General Appearance:  Alert and cooperative, not in any acute distress.  Chronically ill-appearing male; pleasant and interactive.   Head:  Atraumatic and normocephalic, without obvious abnormality.   Eyes:          PERRLA, conjunctivae and sclerae normal, no Icterus. No pallor. Extra-occular movements are within normal limits.   Neck: Supple, trachea midline, no thyromegaly, no carotid bruit.   Lungs:   Chest shape is normal. Breath sounds heard bilaterally with AAE to all lung fields.  Rhonchus referred congestive changes suzanne, cleared somewhat with cough. No Pleural rub or bronchial breathing.   Heart:  Normal S1 and S2, no murmur, no gallop, no rub. No JVD   Abdomen:   Normal bowel sounds, no masses, no organomegaly. Soft       non-tender, non-distended, no guarding, no rebound tenderness.   Extremities: Moves all extremities well, no edema, no cyanosis, no            clubbing.   Skin: No bleeding, bruising or rash.  Age-related atrophy of skin.   Neurologic: Awake, alert and oriented times 3. Moves all 4 extremities equally.   Laboratory results:      Results from last 7 days  Lab Units 09/16/17  0553 09/14/17  0616 09/12/17  0630   SODIUM mmol/L 138 140 140   POTASSIUM mmol/L 4.5 4.3 4.4   CHLORIDE mmol/L 99 99 100   CO2 mmol/L 32.0* 34.0* 35.0*   BUN mg/dL 43* 34* 29*   CREATININE mg/dL 1.00 0.90 0.80   CALCIUM mg/dL 9.0 9.0 8.8   GLUCOSE mg/dL 110* 121* 129*       Results from last 7 days  Lab Units 09/16/17  0595  09/14/17  0616 09/13/17  1350   WBC 10*3/mm3 16.73* 14.12* 14.45*   HEMOGLOBIN g/dL 11.1* 11.9* 12.1*   HEMATOCRIT % 34.0* 37.2* 38.4*   PLATELETS 10*3/mm3 229 228 215                       I have reviewed the patient's laboratory results.    Radiology results:    Imaging Results (last 24 hours)     ** No results found for the last 24 hours. **          I have reviewed the patient's radiology reports.    Medication Review:     I have reviewed the patients active and prn medications.         Assessment:  Principal Problem:    Acute on chronic respiratory failure with hypoxia and hypercapnia  Active Problems:    Atrial fibrillation    Chronic kidney disease, stage III (moderate)    Steroid-dependent COPD    Hyperlipidemia    Hypertension    Enlarged prostate without lower urinary tract symptoms (luts)    Pneumonia of right lower lobe due to infectious organism    Impaired mobility and ADLs    Physical deconditioning        Plan:  Will de-escalate IV steroid freq to q8hrs; will dec mucomyst use to bid and plan on use for 3 more days; will stop IV rocephin and start oral omnicef with plans for 5 more days of tx; cont oxygen supplementation and duonebs prn; plan [facility] tomorrow if pt continues to improve; will cont BiPAP use nightly and prn.  CKD stable, and will continue tx of his HTN.  I have discussed POC in detail with pt today, he verb understanding and agrees.    I have spent a total of 35 mins in direct pt care today.     Sahil Gallego,   09/17/17  1:42 PM

## 2017-09-17 NOTE — PLAN OF CARE
Problem: Patient Care Overview (Adult)  Goal: Plan of Care Review  Outcome: Ongoing (interventions implemented as appropriate)    09/16/17 2030   Coping/Psychosocial Response Interventions   Plan Of Care Reviewed With patient   Patient Care Overview   Progress improving   Outcome Evaluation   Outcome Summary/Follow up Plan pleasant patient-minimal complaint of pain but some complaint of nausea-new IV site-patient tolerated-monitor blood sugar with coverage per protocol-continue to monitor patient       Goal: Discharge Needs Assessment  Outcome: Ongoing (interventions implemented as appropriate)    Problem: Older Inpatient, Acutely Ill (Adult)  Goal: Signs and Symptoms of Listed Potential Problems Will be Absent or Manageable (Older Inpatient, Acutely Ill)  Outcome: Ongoing (interventions implemented as appropriate)    Problem: Pneumonia (Adult)  Goal: Signs and Symptoms of Listed Potential Problems Will be Absent or Manageable (Pneumonia)  Outcome: Ongoing (interventions implemented as appropriate)

## 2017-09-17 NOTE — PLAN OF CARE
Problem: Patient Care Overview (Adult)  Goal: Plan of Care Review  Outcome: Ongoing (interventions implemented as appropriate)    09/17/17 1343   Coping/Psychosocial Response Interventions   Plan Of Care Reviewed With patient   Patient Care Overview   Progress improving

## 2017-09-17 NOTE — PLAN OF CARE
Problem: NPPV/CPAP (Adult)  Goal: Signs and Symptoms of Listed Potential Problems Will be Absent or Manageable (NPPV/CPAP)  Outcome: Ongoing (interventions implemented as appropriate)    09/17/17 1342   NPPV/CPAP   Problems Assessed (NPPV/CPAP) hypoxia/hypoxemia

## 2017-09-18 VITALS
OXYGEN SATURATION: 96 % | SYSTOLIC BLOOD PRESSURE: 104 MMHG | TEMPERATURE: 98.3 F | HEART RATE: 76 BPM | BODY MASS INDEX: 25.34 KG/M2 | HEIGHT: 63 IN | WEIGHT: 143 LBS | DIASTOLIC BLOOD PRESSURE: 65 MMHG | RESPIRATION RATE: 18 BRPM

## 2017-09-18 LAB
GLUCOSE BLDC GLUCOMTR-MCNC: 109 MG/DL (ref 70–130)
GLUCOSE BLDC GLUCOMTR-MCNC: 110 MG/DL (ref 70–130)
GLUCOSE BLDC GLUCOMTR-MCNC: 114 MG/DL (ref 70–130)
GLUCOSE BLDC GLUCOMTR-MCNC: 119 MG/DL (ref 70–130)

## 2017-09-18 PROCEDURE — 25010000002 METHYLPREDNISOLONE PER 40 MG: Performed by: FAMILY MEDICINE

## 2017-09-18 PROCEDURE — 94799 UNLISTED PULMONARY SVC/PX: CPT

## 2017-09-18 PROCEDURE — 82962 GLUCOSE BLOOD TEST: CPT

## 2017-09-18 PROCEDURE — 25010000002 ENOXAPARIN PER 10 MG: Performed by: INTERNAL MEDICINE

## 2017-09-18 PROCEDURE — 94668 MNPJ CHEST WALL SBSQ: CPT

## 2017-09-18 PROCEDURE — 99239 HOSP IP/OBS DSCHRG MGMT >30: CPT | Performed by: NURSE PRACTITIONER

## 2017-09-18 PROCEDURE — 97530 THERAPEUTIC ACTIVITIES: CPT

## 2017-09-18 RX ORDER — IPRATROPIUM BROMIDE AND ALBUTEROL SULFATE 2.5; .5 MG/3ML; MG/3ML
3 SOLUTION RESPIRATORY (INHALATION)
Qty: 360 ML
Start: 2017-09-18 | End: 2020-04-21

## 2017-09-18 RX ORDER — PREDNISONE 10 MG/1
TABLET ORAL
Start: 2017-09-18 | End: 2018-01-11 | Stop reason: HOSPADM

## 2017-09-18 RX ORDER — BUDESONIDE 0.5 MG/2ML
0.5 INHALANT ORAL
Start: 2017-09-18 | End: 2019-09-26 | Stop reason: HOSPADM

## 2017-09-18 RX ORDER — ALPRAZOLAM 0.5 MG/1
0.5 TABLET ORAL NIGHTLY PRN
Qty: 3 TABLET | Refills: 0 | Status: ON HOLD | OUTPATIENT
Start: 2017-09-18 | End: 2018-01-11 | Stop reason: SDUPTHER

## 2017-09-18 RX ORDER — TRAMADOL HYDROCHLORIDE 50 MG/1
50 TABLET ORAL EVERY 6 HOURS PRN
Qty: 10 TABLET | Refills: 0 | Status: ON HOLD | OUTPATIENT
Start: 2017-09-18 | End: 2018-01-11

## 2017-09-18 RX ORDER — CYCLOBENZAPRINE HCL 10 MG
5 TABLET ORAL NIGHTLY PRN
Qty: 30 TABLET | Refills: 0
Start: 2017-09-18 | End: 2017-12-13 | Stop reason: SDUPTHER

## 2017-09-18 RX ORDER — GUAIFENESIN, PSEUDOEPHEDRINE HYDROCHLORIDE 600; 60 MG/1; MG/1
1 TABLET, EXTENDED RELEASE ORAL EVERY 12 HOURS SCHEDULED
Qty: 10 TABLET | Refills: 0
Start: 2017-09-18 | End: 2017-12-22 | Stop reason: SDUPTHER

## 2017-09-18 RX ORDER — CEFDINIR 300 MG/1
300 CAPSULE ORAL EVERY 12 HOURS SCHEDULED
Qty: 8 CAPSULE | Refills: 0
Start: 2017-09-18 | End: 2017-09-22

## 2017-09-18 RX ORDER — BENZONATATE 200 MG/1
200 CAPSULE ORAL 3 TIMES DAILY PRN
Start: 2017-09-18 | End: 2018-02-19

## 2017-09-18 RX ORDER — DIPHENHYDRAMINE HYDROCHLORIDE AND LIDOCAINE HYDROCHLORIDE AND ALUMINUM HYDROXIDE AND MAGNESIUM HYDRO
5 KIT EVERY 8 HOURS
Start: 2017-09-18 | End: 2017-09-23

## 2017-09-18 RX ADMIN — ENOXAPARIN SODIUM 40 MG: 40 INJECTION SUBCUTANEOUS at 08:12

## 2017-09-18 RX ADMIN — DIPHENHYDRAMINE HYDROCHLORIDE AND LIDOCAINE HYDROCHLORIDE AND ALUMINUM HYDROXIDE AND MAGNESIUM HYDRO: KIT at 08:16

## 2017-09-18 RX ADMIN — LOSARTAN POTASSIUM 100 MG: 50 TABLET, FILM COATED ORAL at 08:10

## 2017-09-18 RX ADMIN — IPRATROPIUM BROMIDE AND ALBUTEROL SULFATE 3 ML: .5; 3 SOLUTION RESPIRATORY (INHALATION) at 06:51

## 2017-09-18 RX ADMIN — CARVEDILOL 6.25 MG: 6.25 TABLET, FILM COATED ORAL at 08:12

## 2017-09-18 RX ADMIN — GUAIFENESIN AND PSEUDOEPHEDRINE HYDROCHLORIDE 1 TABLET: 600; 60 TABLET, EXTENDED RELEASE ORAL at 08:11

## 2017-09-18 RX ADMIN — BUDESONIDE 0.5 MG: 0.5 INHALANT RESPIRATORY (INHALATION) at 06:52

## 2017-09-18 RX ADMIN — IPRATROPIUM BROMIDE AND ALBUTEROL SULFATE 3 ML: .5; 3 SOLUTION RESPIRATORY (INHALATION) at 01:34

## 2017-09-18 RX ADMIN — FINASTERIDE 5 MG: 5 TABLET, FILM COATED ORAL at 08:11

## 2017-09-18 RX ADMIN — ARFORMOTEROL TARTRATE 15 MCG: 15 SOLUTION RESPIRATORY (INHALATION) at 06:51

## 2017-09-18 RX ADMIN — ACETYLCYSTEINE 800 MG: 200 SOLUTION ORAL; RESPIRATORY (INHALATION) at 06:50

## 2017-09-18 RX ADMIN — IPRATROPIUM BROMIDE AND ALBUTEROL SULFATE 3 ML: .5; 3 SOLUTION RESPIRATORY (INHALATION) at 13:04

## 2017-09-18 RX ADMIN — ATORVASTATIN CALCIUM 40 MG: 40 TABLET, FILM COATED ORAL at 08:12

## 2017-09-18 RX ADMIN — CEFDINIR 300 MG: 300 CAPSULE ORAL at 08:11

## 2017-09-18 RX ADMIN — SPIRONOLACTONE 12.5 MG: 25 TABLET, FILM COATED ORAL at 08:11

## 2017-09-18 RX ADMIN — METHYLPREDNISOLONE SODIUM SUCCINATE 40 MG: 40 INJECTION, POWDER, FOR SOLUTION INTRAMUSCULAR; INTRAVENOUS at 12:31

## 2017-09-18 RX ADMIN — AMLODIPINE BESYLATE 5 MG: 5 TABLET ORAL at 08:12

## 2017-09-18 RX ADMIN — HYDROCODONE POLISTIREX AND CHLORPHENIRAMINE POLISTIREX 5 ML: 10; 8 SUSPENSION, EXTENDED RELEASE ORAL at 10:25

## 2017-09-18 RX ADMIN — METHYLPREDNISOLONE SODIUM SUCCINATE 40 MG: 40 INJECTION, POWDER, FOR SOLUTION INTRAMUSCULAR; INTRAVENOUS at 04:20

## 2017-09-18 RX ADMIN — CLOPIDOGREL BISULFATE 75 MG: 75 TABLET ORAL at 08:11

## 2017-09-18 NOTE — THERAPY DISCHARGE NOTE
Acute Care - Occupational Therapy Discharge Summary  [facility]     Patient Name: Thurman Mendel Parsons  : 1950  MRN: [id number]    Today's Date: 2017  Onset of Illness/Injury or Date of Surgery Date: 17    Date of Referral to OT: 17  Referring Physician: Dr. Mendez      Admit Date: 2017        OT Recommendation and Plan    Visit Dx:    ICD-10-CM ICD-9-CM   1. Pneumonia of right lower lobe due to infectious organism J18.9 483.8   2. COPD exacerbation J44.1 491.21   3. Acute on chronic respiratory failure with hypercapnia J96.22 518.84   4. Impaired functional mobility and activity tolerance Z74.09 V49.89   5. Impaired mobility and ADLs Z74.09 799.89               Time Calculation- OT       17 1152          Time Calculation- OT    OT Start Time 1009  -SD      Total Timed Code Minutes- OT 15 minute(s)  -SD      OT Received On 17  -SD      OT Goal Re-Cert Due Date 17  -SD        User Key  (r) = Recorded By, (t) = Taken By, (c) = Cosigned By    Initials Name Provider Type    SD [place], OT Occupational Therapist                  OT Goals       09/15/17 1308 17 1251 17 1554    Transfer Training OT LTG    Transfer Training OT LTG, Date Goal Reviewed 09/15/17  -SD 17  -HE 17  -SD    Transfer Training OT LTG, Outcome goal ongoing  -SD goal ongoing  -HE goal ongoing  -SD    Strength OT LTG    Strength Goal OT LTG, Date Goal Reviewed   17  -SD    Strength Goal OT LTG, Outcome   goal met  -SD    LB Dressing OT LTG    LB Dressing Goal OT LTG, Date Goal Reviewed 09/15/17  -SD 17  -HE 17  -SD    LB Dressing Goal OT LTG, Outcome goal ongoing  -SD goal ongoing  -HE goal ongoing  -SD    Functional Mobility OT LTG    Functional Mobility Goal OT LTG, Date Goal Reviewed 09/15/17  -SD 17  -HE 17  -SD    Functional Mobility Goal OT LTG, Outcome goal ongoing  -SD goal ongoing  -HE goal ongoing  -SD      17 1331           Transfer Training OT LTG    Transfer Training OT LTG, Date Established 09/11/17  -      Transfer Training OT LTG, Time to Achieve by discharge  -      Transfer Training OT LTG, Activity Type sit to stand/stand to sit  -      Transfer Training OT LTG, [place] Level independent  -      Transfer Training OT LTG, Outcome goal ongoing  -      Strength OT LTG    Strength Goal OT LTG, Date Established 09/11/17  -      Strength Goal OT LTG, Time to Achieve by discharge  -      Strength Goal OT LTG, Functional Goal Pt will participate in BUE strengthening ex 15 reps each using theraband for resistance.  -      Strength Goal OT LTG, Outcome goal ongoing  -      LB Dressing OT LTG    LB Dressing Goal OT LTG, Date Established 09/11/17  -      LB Dressing Goal OT LTG, Time to Achieve by discharge  -      LB Dressing Goal OT LTG, [place] Level set up required  -      LB Dressing Goal OT LTG, Outcome goal ongoing  -      Functional Mobility OT LTG    Functional Mobility Goal OT LTG, Date Established 09/11/17  -      Functional Mobility Goal OT LTG, Time to Achieve by discharge  -      Functional Mobility Goal OT LTG, [place] Level independent with device  -      Functional Mobility Goal OT LTG, Assist Device rolling walker  -      Functional Mobility Goal OT LTG, Distance to Achieve in hallway  -      Functional Mobility Goal OT LTG, Additional Goal Pt will walk 350' with sba using RW and O2  -      Functional Mobility Goal OT LTG, Outcome goal ongoing  -        User Key  (r) = Recorded By, (t) = Taken By, (c) = Cosigned By    Initials Name Provider Type    SOWMYA Gutierrez Occupational Therapist    SAMIA Ontiveros Occupational Therapist    TONY Edwards OT Occupational Therapist                Outcome Measures       09/18/17 1009          How much help from another is currently needed...    Putting on and taking off regular lower body clothing? 4  -SD      Bathing  (including washing, rinsing, and drying) 3  -SD      Toileting (which includes using toilet bed pan or urinal) 4  -SD      Putting on and taking off regular upper body clothing 4  -SD      Taking care of personal grooming (such as brushing teeth) 4  -SD      Eating meals 4  -SD      Score 23  -SD      Functional Assessment    Outcome Measure Options AM-PAC 6 Clicks Daily Activity (OT)  -SD        User Key  (r) = Recorded By, (t) = Taken By, (c) = Cosigned By    Initials Name Provider Type    SD Kelle Edwards OT Occupational Therapist          Therapy Charges for Today     Code Description Service Date Service Provider Modifiers Qty    89629911711  OT THERAPEUTIC ACT EA 15 MIN 9/18/2017 Kelle Edwards OT GO 1                 Kelle Edwards OT  9/18/2017

## 2017-09-18 NOTE — PLAN OF CARE
Problem: Patient Care Overview (Adult)  Goal: Plan of Care Review  Outcome: Outcome(s) achieved Date Met:  09/18/17 09/18/17 9095   Coping/Psychosocial Response Interventions   Plan Of Care Reviewed With patient   Patient Care Overview   Progress improving   Outcome Evaluation   Outcome Summary/Follow up Plan Patient completed bed mobility and transfer from bed to chair with cond ind. Completed LBD task with Ind. Did not wish to walk today due to DC to rehab later this afternoon. Patient continues to experience dyspnea upon exertion and deficits in endurance with functional tasks. Has made good progress with OT during inpatient stay.

## 2017-09-18 NOTE — PLAN OF CARE
Problem: Older Inpatient, Acutely Ill (Adult)  Goal: Signs and Symptoms of Listed Potential Problems Will be Absent or Manageable (Older Inpatient, Acutely Ill)  Outcome: Ongoing (interventions implemented as appropriate)    09/18/17 1327   Older Adult Inpatient, Acutely Ill   Problems Assessed (Acutely Ill Older Adult) all   Problems Present (Acutely Ill Older Adult) functional decline/self-care deficit         Problem: Pneumonia (Adult)  Goal: Signs and Symptoms of Listed Potential Problems Will be Absent or Manageable (Pneumonia)  Outcome: Ongoing (interventions implemented as appropriate)

## 2017-09-18 NOTE — DISCHARGE PLACEMENT REQUEST
"[place]  Updated Notes    From Belgica Barry RN  [phone]          Parsons, Thurman Mendel (67 y.o. Male)     Date of Birth Social Security Number Address Home Phone MRN    1950  [address] [facility] [address] [phone] [id number]    [employer] Marital Status          None        Admission Date Admission Type Admitting Provider Attending Provider Department, Room/Bed    9/8/17 Emergency Joshua Mendez, Sahil Florian DO [facility] MED SURG  4, 403/1    Discharge Date Discharge Disposition Discharge Destination                      Attending Provider: Sahil Gallego DO     Allergies:  Sulfa Antibiotics    Isolation:  None   Infection:  None   Code Status:  FULL    Ht:  63\" (160 cm)   Wt:  143 lb (64.9 kg)    Admission Cmt:  None   Principal Problem:  Acute on chronic respiratory failure with hypoxia and hypercapnia [J96.21,J96.22]                 Active Insurance as of 9/8/2017     Primary Coverage     Payor Plan Insurance Group Employer/Plan Group    [facility] MEDICARE REPLACEMENT [facility] [id number]     Payor Plan Address Payor Plan Phone Number Effective From Effective To    [address] [address]  1/1/2016     [place] [address]       Subscriber Name Subscriber Birth Date Member ID       PARSONS,THURMAN MENDEL 1950 [id number]                 Emergency Contacts      (Rel.) Home Phone Work Phone Mobile Phone    Edward Easton -- -- [phone]    Jean Marie Pena -- -- [phone]    April Easton -- -- [phone]    JeanMinda (Mother) [phone] -- --            Hospital Medications (active)       Dose Frequency Start End    acetaminophen (TYLENOL) tablet 650 mg 650 mg Every 4 Hours PRN 9/8/2017     Sig - Route: Take 2 tablets by mouth Every 4 (Four) Hours As Needed for Mild Pain . - Oral    acetylcysteine (MUCOMYST) 20 % solution 600 mg 600 mg 2 Times Daily - RT 9/17/2017 9/20/2017    Sig - Route: Take 3 " mL by nebulization 2 (Two) Times a Day. - Nebulization    ALPRAZolam (XANAX) tablet 0.5 mg 0.5 mg Nightly PRN 9/8/2017     Sig - Route: Take 1 tablet by mouth At Night As Needed for Anxiety. - Oral    amLODIPine (NORVASC) tablet 5 mg 5 mg Every 24 Hours Scheduled 9/9/2017     Sig - Route: Take 1 tablet by mouth Daily. - Oral    arformoterol (BROVANA) nebulizer solution 15 mcg 15 mcg 2 Times Daily - RT 9/13/2017     Sig - Route: Take 2 mL by nebulization 2 (Two) Times a Day. - Nebulization    atorvastatin (LIPITOR) tablet 40 mg 40 mg Daily 9/9/2017     Sig - Route: Take 1 tablet by mouth Daily. - Oral    benzonatate (TESSALON) capsule 200 mg 200 mg 3 Times Daily PRN 9/10/2017     Sig - Route: Take 2 capsules by mouth 3 (Three) Times a Day As Needed for Cough. - Oral    budesonide (PULMICORT) nebulizer solution 0.5 mg 0.5 mg 2 Times Daily - RT 9/13/2017     Sig - Route: Take 2 mL by nebulization 2 (Two) Times a Day. - Nebulization    carvedilol (COREG) tablet 6.25 mg 6.25 mg 2 Times Daily With Meals 9/9/2017     Sig - Route: Take 1 tablet by mouth 2 (Two) Times a Day With Meals. - Oral    cefdinir (OMNICEF) capsule 300 mg 300 mg Every 12 Hours Scheduled 9/17/2017 9/22/2017    Sig - Route: Take 1 capsule by mouth Every 12 (Twelve) Hours. - Oral    clopidogrel (PLAVIX) tablet 75 mg 75 mg Daily 9/9/2017     Sig - Route: Take 1 tablet by mouth Daily. - Oral    dextrose (D50W) solution 25 g 25 g Every 15 Minutes PRN 9/8/2017     Sig - Route: Infuse 50 mL into a venous catheter Every 15 (Fifteen) Minutes As Needed for Low Blood Sugar (Blood Sugar Less Than 70, Patient Has IV Access - Unresponsive, NPO or Unable To Safely Swallow). - Intravenous    dextrose (GLUTOSE) oral gel 1 tube 1 tube Every 15 Minutes PRN 9/8/2017     Sig - Route: Take 1 tube by mouth Every 15 (Fifteen) Minutes As Needed for Low Blood Sugar (Blood Sugar Less Than 70, Patient Alert, Is Not NPO & Can Safely Swallow). - Oral    enoxaparin (LOVENOX) syringe  40 mg 40 mg Daily 9/9/2017     Sig - Route: Inject 0.4 mL under the skin Daily. - Subcutaneous    escitalopram (LEXAPRO) tablet 10 mg 10 mg Nightly 9/8/2017     Sig - Route: Take 1 tablet by mouth Every Night. - Oral    finasteride (PROSCAR) tablet 5 mg 5 mg Daily 9/9/2017     Sig - Route: Take 1 tablet by mouth Daily. - Oral    First Mouthwash (Magic Mouthwash)  Every 8 Hours 9/17/2017     Sig - Route: Swish and spit Every 8 (Eight) Hours. - Swish & Spit    Notes to Pharmacy: To include equal parts nystatin/maalox/viscous lidocaine/benadryl    glucagon (human recombinant) (GLUCAGEN DIAGNOSTIC) injection 1 mg 1 mg Every 15 Minutes PRN 9/8/2017     Sig - Route: Inject 1 mg under the skin Every 15 (Fifteen) Minutes As Needed (Blood Glucose Less Than 70 - Patient Without IV Access - Unresponsive, NPO or Unable To Safely Swallow). - Subcutaneous    HYDROcod Polst-CPM Polst ER (TUSSIONEX PENNKINETIC) 10-8 MG/5ML ER suspension 5 mL 5 mL Every 12 Hours PRN 9/8/2017 9/18/2017    Sig - Route: Take 5 mL by mouth Every 12 (Twelve) Hours As Needed for Cough. - Oral    HYDROcod Polst-CPM Polst ER (TUSSIONEX PENNKINETIC) 10-8 MG/5ML ER suspension 5 mL 5 mL Every 12 Hours PRN 9/12/2017 9/22/2017    Sig - Route: Take 5 mL by mouth Every 12 (Twelve) Hours As Needed for Cough. - Oral    HYDROcod Polst-CPM Polst ER (TUSSIONEX PENNKINETIC) 10-8 MG/5ML ER suspension 5 mL 5 mL Every 12 Hours 9/15/2017 9/25/2017    Sig - Route: Take 5 mL by mouth Every 12 (Twelve) Hours. - Oral    insulin aspart (novoLOG) injection 0-7 Units 0-7 Units 4 Times Daily Before Meals & Nightly 9/8/2017     Sig - Route: Inject 0-7 Units under the skin 4 (Four) Times a Day Before Meals & at Bedtime. - Subcutaneous    ipratropium-albuterol (DUO-NEB) nebulizer solution 3 mL 3 mL Every 6 Hours - RT 9/9/2017     Sig - Route: Take 3 mL by nebulization Every 6 (Six) Hours. - Nebulization    losartan (COZAAR) tablet 100 mg 100 mg Daily 9/9/2017     Sig - Route: Take 2  tablets by mouth Daily. - Oral    methylPREDNISolone sodium succinate (SOLU-Medrol) injection 40 mg 40 mg Every 8 Hours 9/17/2017     Sig - Route: Infuse 1 mL into a venous catheter Every 8 (Eight) Hours. - Intravenous    morphine injection 1 mg 1 mg Every 4 Hours PRN 9/14/2017 9/24/2017    Sig - Route: Infuse 0.5 mL into a venous catheter Every 4 (Four) Hours As Needed for Severe Pain  (Pain). - Intravenous    ondansetron (ZOFRAN) injection 4 mg 4 mg Every 6 Hours PRN 9/8/2017     Sig - Route: Infuse 2 mL into a venous catheter Every 6 (Six) Hours As Needed for Nausea or Vomiting. - Intravenous    pseudoephedrine-guaifenesin (MUCINEX D)  MG per 12 hr tablet 1 tablet 1 tablet Every 12 Hours Scheduled 9/15/2017     Sig - Route: Take 1 tablet by mouth Every 12 (Twelve) Hours. - Oral    sodium chloride 0.9 % flush 1-10 mL 1-10 mL As Needed 9/8/2017     Sig - Route: Infuse 1-10 mL into a venous catheter As Needed for Line Care. - Intravenous    spironolactone (ALDACTONE) tablet 12.5 mg 12.5 mg Daily 9/9/2017     Sig - Route: Take 0.5 tablets by mouth Daily. - Oral    tamsulosin (FLOMAX) 24 hr capsule 0.4 mg 0.4 mg Nightly 9/8/2017     Sig - Route: Take 1 capsule by mouth Every Night. - Oral    traMADol (ULTRAM) tablet 50 mg 50 mg Every 6 Hours PRN 9/8/2017 9/18/2017    Sig - Route: Take 1 tablet by mouth Every 6 (Six) Hours As Needed for Moderate Pain . - Oral    acetylcysteine (MUCOMYST) 20 % solution 600 mg (Discontinued) 600 mg Every 8 Hours 9/15/2017 9/17/2017    Sig - Route: Take 3 mL by nebulization Every 8 (Eight) Hours. - Nebulization    cefTRIAXone (ROCEPHIN) 1 g/100 mL 0.9% NS VTB (mbp) (Discontinued) 1 g Every 24 Hours 9/9/2017 9/17/2017    Sig - Route: Infuse 100 mL into a venous catheter Daily. - Intravenous    methylPREDNISolone sodium succinate (SOLU-Medrol) injection 40 mg (Discontinued) 40 mg Every 6 Hours 9/13/2017 9/17/2017    Sig - Route: Infuse 1 mL into a venous catheter Every 6 (Six) Hours.  - Intravenous          Physical Therapy Notes (last 24 hours) (Notes from 9/17/2017 10:11 AM through 9/18/2017 10:11 AM)     No notes of this type exist for this encounter.        Occupational Therapy Notes (last 24 hours) (Notes from 9/17/2017 10:11 AM through 9/18/2017 10:11 AM)     No notes of this type exist for this encounter.

## 2017-09-18 NOTE — PROGRESS NOTES
Continued Stay Note   [facility]     Patient Name: Thurman Mendel Parsons  MRN: [id number]  Today's Date: 9/18/2017    Admit Date: 9/8/2017          Discharge Plan       09/18/17 1228    Case Management/Social Work Plan    Plan To go to [facility] today, waiting on [facility] approval.  Cannot go until after 2 pm anyway due to having to get room cleaned.  Bipap setting 14/6 with 4 liters of o2.  Settings sent to Natasha.  Call Report to [phone] and fax dcs to [phone].  Spoke to pt in room and informed.  States will need EMS transport, family is coming and will bring some of his things.  EMS form on chart.               Discharge Codes     None        Expected Discharge Date and Time     Expected Discharge Date Expected Discharge Time    Sep 18, 2017             Belgica Barry

## 2017-09-18 NOTE — PLAN OF CARE
Problem: Patient Care Overview (Adult)  Goal: Plan of Care Review    09/18/17 0327   Coping/Psychosocial Response Interventions   Plan Of Care Reviewed With patient   Patient Care Overview   Progress improving

## 2017-09-18 NOTE — DISCHARGE INSTRUCTIONS
Oxygen 4 liters nasal canula  Flutter valve TID  Check blood BS before meals and at bedtime.    Sliding scale insulin per facility protocol  Bipap 14/6 with 4 liters inline oxygen

## 2017-09-18 NOTE — DISCHARGE SUMMARY
[facility]   DISCHARGE SUMMARY    Name:  Thurman Mendel Parsons   Age:  67 y.o.  Sex:  male  :  1950  MRN:  [id number]   Visit Number:  [id number]  Primary Care Physician:  Miguel Rojas MD  Date of Discharge:  2017  Admission Date:  2017      Presenting Problem:    Pneumonia of right lower lobe due to infectious organism [J18.9]       Discharge Diagnosis:     Principal Problem:    Pneumonia of right lower lobe due to infectious organism  Active Problems:    Atrial fibrillation    Chronic kidney disease, stage III (moderate)    Steroid-dependent COPD    Hyperlipidemia    Hypertension    Enlarged prostate without lower urinary tract symptoms (luts)    Acute on chronic respiratory failure with hypoxia and hypercapnia    Impaired mobility and ADLs    Physical deconditioning        Past Medical History:  Past Medical History:   Diagnosis Date   • Abnormal liver enzymes    • Acute bronchitis    • Anemia    • Anxiety    • Arthritis    • Atherosclerotic heart disease of native coronary artery without angina pectoris    • Atrial fibrillation    • Atypical chest pain    • Back pain    • BPH (benign prostatic hyperplasia)    • Cardiac pain    • Chronic bronchitis    • Chronic kidney disease    • COPD (chronic obstructive pulmonary disease)    • Degeneration of cervical intervertebral disc    • Depression    • Diabetes mellitus    • Diverticulosis    • Dyspnea    • Esophageal reflux    • Esophagitis    • Gastritis    • Hematuria    • Hemorrhoids    • Hiatal hernia    • History of arthritis    • History of myocardial infarction    • History of peripheral neuropathy    • History of ventricular septal defect    • History of ventricular septal defect    • Hyperlipidemia    • Hypertension    • Infectious diarrhea    • Infectious diarrhea    • Kyphosis, acquired    • Lumbar radiculopathy    • Mitral and aortic valve disease    • Myocardial infarction    • Nephrolithiasis    • Phimosis     • Respiratory failure    • Sleep apnea    • Stroke syndrome    • Stroke syndrome    • Urinary calculus    • Urinary tract infection    • Ventral hernia          Consults:     Consults     Date and Time Order Name Status Description    9/9/2017 0526 Inpatient Consult to Cardiology Completed           Procedures Performed:  None           History of presenting illness:  This is a 67-year-old male with past medical history as listed above who presented to the emergency room with complaints of cough and shortness of breath.  He has BiPAP at home but has been noncompliant.  He was brought in via private vehicle.  According to the patient the day prior to admission he started having worsening shortness of breath.  He been treated with doxycycline several weeks prior to this as well as steroids but really had not shown significant improvement.  Initially in the emergency room oxygen saturations were in the 80s and he was placed on a nonrebreather.  Blood gas was done which showed a PCO2 of 97.  Patient was placed on BiPAP.  He did improve after placing him on the BiPAP.  He does have a history of COPD and hasn't seen Dr. Lopez in the past.  He was admitted to the hospitalist service for further medical management.      Hospital Course:  Upon admission to the hospitalist service patient was started on IV antibiotics in the form Rocephin and azithromycin as well as breathing treatments mucolytics and IV steroids.  His ABGs did show improvement of his PCO2 with the BiPAP.  We did ask that he bring his BiPAP in an upon evaluation his home BiPAP and not been ordered opened.  According to the patient he has a hard time putting the mask on himself.  Pulmonology was consulted for further evaluation.  Dr. Toribio with pulmonary has seen and evaluated the patient and is recommending continuation of the BiPAP.    From the respiratory standpoint patient has been slow to improve.  He did have some hemoptysis for which has now resolved.   Overall the patient does appear to be doing better.  On Friday he was started on Mucomyst which has helped significantly over the weekend.  Patient still appears to be weak PT OT was consulted and has been working with the patient.  We did have a long discussion with the patient he is in agreement for rehabilitation placement.    Last week patient did have some right lower back complaint pain that radiated to his right going KUB was done which showed some nephrolithiasis however there was no evidence of obstruction.  This pain that he experienced was likely secondary to the passage of a renal stone.  Patient had just gotten up and went to the restroom around 4 AM and when he came back to bed he developed a sudden onset of pain.  Hip x-ray showed some degenerative changes but no acute abnormalities.    When he was initially admitted he did have some elevated troponins for which cardiology was consulted.  It was felt that this is myocardial injury secondary to supply demand mismatch and not evidence of a non-ST elevated myocardial infarction.   Breeding with cardiology as recommended continuation of medical management.    Patient at this time does appear to be doing better.  He is symptomatically improving and hemodynamically stable.  We will plan to discharge to rehabilitation today.  He will need to follow-up with pulmonary on an outpatient basis and follow-up with his primary care after his discharge from rehabilitation.  We will continue with oxygen and BiPAP.      Vital Signs:    Temp:  [98 °F (36.7 °C)-98.3 °F (36.8 °C)] 98.3 °F (36.8 °C)  Heart Rate:  [66-87] 87  Resp:  [14-20] 18  BP: ()/(50-66) 114/52    Physical Exam:  General Appearance:    Alert and cooperative, not in any acute distress.   Head:    Atraumatic and normocephalic, without obvious abnormality.   Eyes:            PERRLA, conjunctivae and sclerae normal, no Icterus. No pallor. Extra-occular movements are within normal limits.   Ears:     Ears appear intact with no abnormalities noted.   Throat:   No oral lesions, no thrush, oral mucosa moist.   Neck:   Supple, trachea midline, no thyromegaly, no carotid bruit.   Back:     No kyphoscoliosis present. No tenderness to palpation,   range of motion normal.   Lungs:     Chest shape is normal. Breath sounds heard bilaterally equally.  Few crackles  no wheezing. No Pleural rub or bronchial breathing.    Heart:    Normal S1 and S2, no murmur, no gallop, no rub. No JVD   Abdomen:     Normal bowel sounds, no masses, no organomegaly. Soft        non-tender, non-distended, no guarding, no rebound                tenderness   Extremities:   Moves all extremities well, no edema, no cyanosis, no             clubbing   Pulses:   Pulses palpable and equal bilaterally   Skin:   No bleeding, bruising or rash   Lymph nodes:  Psychiatric/Behavior:    No palpable adenopathy    Normal mood, normal behavior   Neurologic:   Cranial nerves 2 - 12 grossly intact, sensation intact, Motor power is normal and equal bilaterally.           Pertinent Lab Results:       Results from last 7 days  Lab Units 09/16/17  0553 09/14/17  0616 09/12/17  0630   SODIUM mmol/L 138 140 140   POTASSIUM mmol/L 4.5 4.3 4.4   CHLORIDE mmol/L 99 99 100   CO2 mmol/L 32.0* 34.0* 35.0*   BUN mg/dL 43* 34* 29*   CREATININE mg/dL 1.00 0.90 0.80   CALCIUM mg/dL 9.0 9.0 8.8   GLUCOSE mg/dL 110* 121* 129*       Results from last 7 days  Lab Units 09/16/17  0553 09/14/17  0616 09/13/17  1350   WBC 10*3/mm3 16.73* 14.12* 14.45*   HEMOGLOBIN g/dL 11.1* 11.9* 12.1*   HEMATOCRIT % 34.0* 37.2* 38.4*   PLATELETS 10*3/mm3 229 228 215                Pertinent Radiology Results:    Imaging Results (all)     Procedure Component Value Units Date/Time    XR Chest 1 View [[id number]] Collected:  09/08/17 1320     Updated:  09/08/17 1323    Narrative:       PROCEDURE: XR CHEST 1 VW-     HISTORY: sob     COMPARISON: August 1, 2017.     FINDINGS: The heart is normal in size.  The mediastinum is unremarkable.  There are low lung volumes with a right basilar opacity which may  reflect atelectasis or pneumonia. There is no pneumothorax.  There are  no acute osseous abnormalities.           Impression:       Low lung volumes with a right basilar opacity which may  reflect atelectasis or pneumonia.     Continued followup is recommended.     This report was finalized on 9/8/2017 1:20 PM by Richelle Patel M.D..    XR Spine Lumbar 2 or 3 View [[id number]] Collected:  09/09/17 1138     Updated:  09/09/17 1140    Narrative:       FINAL REPORT    TECHNIQUE:  3 views    CLINICAL HISTORY:  LOWER BACK PAIN    FINDINGS:  3 views of the lumbar spine were obtained.  There is no acute fracture. There is disc space narrowing at L3-L4 and L4-L5.  Anterior osteophytes and degenerative facet changes are seen throughout.  There is mild retrolisthesis of L3 on L4.  Aortoiliac   vascular calcifications are noted.      Impression:       No acute bony abnormality.    Authenticated by Pasquale Williamson MD on 09/09/2017 11:38:51 AM    XR Chest PA & Lateral [[id number]] Collected:  09/11/17 1625     Updated:  09/11/17 1631    Narrative:       PROCEDURE: XR CHEST PA AND LATERAL-        HISTORY: pneumonia; J18.9-Pneumonia, unspecified organism; J44.1-Chronic  obstructive pulmonary disease with (acute) exacerbation; J96.22-Acute  and chronic respiratory failure with hypercapnia; Z74.09-Other reduced  mobility     COMPARISON: September 8, 2017.     FINDINGS: The heart is normal in size. A PFO closure device is in place.  The mediastinum is unremarkable. There are emphysematous changes to the  lungs. There are small bilateral pleural effusions and bibasilar  opacities which may reflect atelectasis or pneumonia. There is no  pneumothorax. There are no acute osseous abnormalities.           Impression:       Small bilateral pleural effusions and bibasilar opacities  which may reflect atelectasis or pneumonia.            This report was finalized on 9/11/2017 4:29 PM by Richelle Patel M.D..    XR Hip With or Without Pelvis 2 - 3 View Right [[id number]] Collected:  09/14/17 1725     Updated:  09/14/17 1726    Narrative:       FINAL REPORT    CLINICAL HISTORY:  RIGHT HIP PAIN    FINDINGS:  There is no acute fracture, dislocation or non surgical radiopaque foreign body. There is mild degenerative joint narrowing. Left hip prosthesis are anatomically aligned.    Impression: Degenerative changes but no acute abnormality      Impression:       Authenticated by Geovany Mullins MD on 09/14/2017 05:25:16 PM    XR Abdomen KUB [[id number]] Collected:  09/14/17 1728     Updated:  09/14/17 1730    Narrative:       FINAL REPORT    CLINICAL HISTORY:  RIGHT FLANK PAIN    FINDINGS:  There are right renal stones. A large amount of stool consistent with constipation. There is no evidence for obstruction.    Impression:    Right nephrolithiasis    Constipation      Impression:       Authenticated by Geovany Mullins MD on 09/14/2017 05:28:06 PM    XR Chest 2 View [[id number]] Collected:  09/14/17 1733     Updated:  09/14/17 1734    Narrative:       FINAL REPORT    CLINICAL HISTORY:  PNA    FINDINGS:  Interstitial prominence is consistent with chronic lung disease. There is bilateral atelectasis without discrete infiltrate or effusion. The heart is enlarged. Aortic knob is calcified. A neurostimulator is present.    Impression: Chronic changes with bilateral atelectasis      Impression:       Authenticated by Geovany Mullins MD on 09/14/2017 05:33:51 PM          Condition on Discharge:    Stable        Discharge Disposition:        Discharge Medication:     [facility] Home Medication Instructions FEI:[id number]    Printed on:09/18/17 1032   Medication Information                      alfuzosin (UROXATRAL) 10 MG 24 hr tablet  Take 1 tablet by mouth Daily.             ALPRAZolam (XANAX) 0.5 MG tablet  Take 1 tablet by mouth At  Night As Needed for Anxiety.             amLODIPine (NORVASC) 5 MG tablet  TAKE ONE TABLET BY MOUTH DAILY             atorvastatin (LIPITOR) 40 MG tablet  Take 1 tablet by mouth Daily.             benzonatate (TESSALON) 200 MG capsule  Take 1 capsule by mouth 3 (Three) Times a Day As Needed for Cough.             BREO ELLIPTA 200-25 MCG/INH aerosol powder   Inhale 1 puff Daily.             budesonide (PULMICORT) 0.5 MG/2ML nebulizer solution  Take 2 mL by nebulization 2 (Two) Times a Day.             carvedilol (COREG) 3.125 MG tablet  TAKE ONE TABLET BY MOUTH TWICE A DAY WITH MEALS             cefdinir (OMNICEF) 300 MG capsule  Take 1 capsule by mouth Every 12 (Twelve) Hours for 4 days. Indications: Upper Respiratory Tract Infection             clopidogrel (PLAVIX) 75 MG tablet  Take 1 tablet by mouth Daily.             cyclobenzaprine (FLEXERIL) 10 MG tablet  Take 0.5 tablets by mouth At Night As Needed for Muscle Spasms.             DPH-Lido-AlHydr-MgHydr-Simeth (FIRST MOUTHWASH, MAGIC MOUTHWASH,) suspension  Swish and spit 5 mL Every 8 (Eight) Hours for 5 days.             escitalopram (LEXAPRO) 10 MG tablet  Take 1 tablet by mouth Daily.             finasteride (PROSCAR) 5 MG tablet  Take 5 mg by mouth Daily.             fluticasone (FLONASE) 50 MCG/ACT nasal spray  1 spray Daily.             HYDROcod Polst-CPM Polst ER (TUSSIONEX PENNKINETIC) 10-8 MG/5ML ER suspension  Take 5 mL by mouth Every 12 (Twelve) Hours As Needed for Cough.             insulin aspart (novoLOG) 100 UNIT/ML injection  Inject 0-7 Units under the skin 4 (Four) Times a Day Before Meals & at Bedtime.             ipratropium-albuterol (DUO-NEB) 0.5-2.5 mg/mL nebulizer  Take 3 mL by nebulization Every 6 (Six) Hours.             losartan (COZAAR) 100 MG tablet  Take 1 tablet by mouth daily.             nitroglycerin (NITROSTAT) 0.4 MG SL tablet  Place  under the tongue.             oxybutynin (DITROPAN) 5 MG tablet  TAKE ONE TABLET BY MOUTH AT  BEDTIME             predniSONE (DELTASONE) 10 MG tablet  4 tabs daily X 3 days then 2 tabs daily X 3 days then 1 tab daily X 3 days then stop.             PROAIR  (90 BASE) MCG/ACT inhaler  Inhale 2 puffs Every 4 (Four) Hours As Needed for Wheezing or Shortness of Air.             pseudoephedrine-guaifenesin (MUCINEX D)  MG per 12 hr tablet  Take 1 tablet by mouth Every 12 (Twelve) Hours.             spironolactone (ALDACTONE) 12.5 MG tablet  Take 2 quarter tablet by mouth Daily.             traMADol (ULTRAM) 50 MG tablet  Take 1 tablet by mouth Every 6 (Six) Hours As Needed for Moderate Pain .                 Discharge Diet:     Diet Instructions     Diet: Dysphagia, Cardiac; Thin Liquids, No Restrictions; Mechanical Soft       Discharge Diet:   Dysphagia  Cardiac      Fluid Consistency:  Thin Liquids, No Restrictions   Pureed Options:  Mechanical Soft                 Activity at Discharge:     Activity Instructions     Discharge Activity       As tolerates                 Follow-up Appointments:    Future Appointments  Date Time Provider Department Center   10/23/2017 10:45 AM Miguel Rojas MD [facility] None   3/12/2018 11:30 AM Ulysses Bass MD [facility] None   4/16/2018 1:15 PM Roosevelt Lowe MD [facility] None         Test Results Pending at Discharge:     Order Current Status    Respiratory Culture Preliminary result          Discharge Instructions:  Oxygen 4 liters nasal canula  Flutter valve TID  Check blood BS before meals and at bedtime.    Sliding scale insulin per facility protocol  Bipap 14/6 with 4 liters inline oxygen       PAL Stern  09/18/17  10:32 AM    Time:  40 minutes were spent reviewing labs, history, evaluating patient and discharge planning.

## 2017-09-19 LAB
BACTERIA SPEC RESP CULT: ABNORMAL
BACTERIA SPEC RESP CULT: ABNORMAL
GRAM STN SPEC: ABNORMAL

## 2017-09-19 NOTE — PROGRESS NOTES
Case Management Discharge Note         Discharge Placement     Facility/Agency Request Status Selected? Address Phone Number Fax Number    [facility] Pending - No Request Sent     [address], [place] [address] [phone]        Amb[facility]: [facility]    Discharge Codes: 03  Discharged/transferred to skilled nursing facility (SNF) with Medicare certification in anticipation of skilled care

## 2017-09-19 NOTE — PROGRESS NOTES
[facility]    Name:  Thurman Mendel Parsons   Age:  67 y.o.  Sex:  male  :  1950  MRN:  [id number]   Visit Number:  [id number]  Admission Date:  2017  Date Of Service:  17  Primary Care Physician:  Miguel Rojas MD    I have seen patients sputum culture results.   He was discharged on Omnicef so I have changed to Levaquin.  I have notified rehab facility [facility] of lab results.  Recommended adding Diflucan X 7 days to his medication regimen.    Vital signs:    Vital Signs (last 24 hours)        0700  -   0659  0700  -   0822   Most Recent    Temp (°F)   98.3       98.3 (36.8)    Heart Rate 76 -  87       76    Resp 18 -  20       18    /65 -  114/52       104/65    SpO2 (%) 96 -  97       96            PAL Stern  17  8:22 AM

## 2017-09-21 ENCOUNTER — TRANSCRIBE ORDERS (OUTPATIENT)
Dept: ADMINISTRATIVE | Facility: HOSPITAL | Age: 67
End: 2017-09-21

## 2017-09-21 ENCOUNTER — OUTSIDE FACILITY SERVICE (OUTPATIENT)
Dept: FAMILY MEDICINE CLINIC | Facility: CLINIC | Age: 67
End: 2017-09-21

## 2017-09-21 DIAGNOSIS — R13.10 DYSPHAGIA, UNSPECIFIED TYPE: Primary | ICD-10-CM

## 2017-09-21 PROCEDURE — 99305 1ST NF CARE MODERATE MDM 35: CPT | Performed by: INTERNAL MEDICINE

## 2017-09-27 ENCOUNTER — HOSPITAL ENCOUNTER (OUTPATIENT)
Dept: GENERAL RADIOLOGY | Facility: HOSPITAL | Age: 67
Discharge: HOME | End: 2017-09-27
Attending: INTERNAL MEDICINE | Admitting: INTERNAL MEDICINE

## 2017-09-27 DIAGNOSIS — R13.10 DYSPHAGIA, UNSPECIFIED TYPE: ICD-10-CM

## 2017-09-27 PROCEDURE — 74230 X-RAY XM SWLNG FUNCJ C+: CPT

## 2017-09-27 PROCEDURE — G8996 SWALLOW CURRENT STATUS: HCPCS

## 2017-09-27 PROCEDURE — 92611 MOTION FLUOROSCOPY/SWALLOW: CPT

## 2017-09-27 PROCEDURE — G8997 SWALLOW GOAL STATUS: HCPCS

## 2017-09-27 PROCEDURE — G8998 SWALLOW D/C STATUS: HCPCS

## 2017-09-27 NOTE — MBS/VFSS/FEES
Outpatient Speech Language Pathology   Adult Swallow Modified Barium Swallow Study  CARLOS Shane     Patient Name: Thurman Mendel Parsons  : 1950  MRN: [id number]  Today's Date: 2017         Visit Date: 2017   Patient Active Problem List   Diagnosis   • Anxiety   • Atherosclerotic heart disease of native coronary artery without angina pectoris   • Atrial fibrillation   • Chronic bronchitis   • Chronic kidney disease, stage III (moderate)   • Steroid-dependent COPD   • Degeneration of cervical intervertebral disc   • Diabetes mellitus   • GERD without esophagitis   • Diverticulosis   • Hemorrhoids   • Hiatal hernia   • Hyperlipidemia   • Hypertension   • Kyphosis, acquired   • Mitral and aortic valve disease   • Phimosis   • Sleep apnea   • Enlarged prostate without lower urinary tract symptoms (luts)   • History of arthritis   • Back pain   • Depression   • Acute on chronic respiratory failure with hypoxia and hypercapnia   • Stroke syndrome   • History of ventricular septal defect   • Neck pain   • COPD exacerbation   • Pneumonia of right lower lobe due to infectious organism   • Impaired mobility and ADLs   • Physical deconditioning        Past Medical History:   Diagnosis Date   • Abnormal liver enzymes    • Acute bronchitis    • Anemia    • Anxiety    • Arthritis    • Atherosclerotic heart disease of native coronary artery without angina pectoris    • Atrial fibrillation    • Atypical chest pain    • Back pain    • BPH (benign prostatic hyperplasia)    • Cardiac pain    • Chronic bronchitis    • Chronic kidney disease    • COPD (chronic obstructive pulmonary disease)    • Degeneration of cervical intervertebral disc    • Depression    • Diabetes mellitus    • Diverticulosis    • Dyspnea    • Esophageal reflux    • Esophagitis    • Gastritis    • Hematuria    • Hemorrhoids    • Hiatal hernia    • History of arthritis    • History of myocardial infarction    • History of peripheral neuropathy     • History of ventricular septal defect    • History of ventricular septal defect    • Hyperlipidemia    • Hypertension    • Infectious diarrhea    • Infectious diarrhea    • Kyphosis, acquired    • Lumbar radiculopathy    • Mitral and aortic valve disease    • Myocardial infarction    • Nephrolithiasis    • Phimosis    • Respiratory failure    • Sleep apnea    • Stroke syndrome    • Stroke syndrome    • Urinary calculus    • Urinary tract infection    • Ventral hernia         Past Surgical History:   Procedure Laterality Date   • HERNIA REPAIR     • SUPRAPUBIC CATHETER INSERTION      History of Percutaneous Catheter Placement Into Ureter   • THROAT SURGERY     • VSD REPAIR      History of VSD Repair By Patch Closure         Visit Dx:     ICD-10-CM ICD-9-CM   1. Dysphagia, unspecified type R13.10 787.20                   Adult Dysphagia - 09/27/17 1050     Adult Dysphagia Background    Patient Description of Complaint food sticking and choking w/po intake  -TM    Symptoms Reported by Patient Choking;Food gets stuck;Other (comment)   hx pneumonia  -TM    Patient Report of Functional Impact difficulty and discomfort w/po intake  -TM    History Pertinent to Diagnosis recurrent pneu, GERD, HH, esophageal dilitation  -TM    Current Diet (Solids) Regular  -TM    Diet Level (Liquids) Thin Liquid  -TM    Oral Mech    Respiratory/Swallow Coordination Pattern WFL  -TM    Position During Evaluation Upright  -TM    Anatomy/Physiology WNL Patient demonstrates anatomy that is WNL  -TM    Dentition Patient is partially edentulous  -TM    Oral Health Oral cavity is clean  -TM    Awareness/Control of Secretions Patient swallows saliva  -TM    Foods/Liquids Presented    Method of Administration Fed by examiner  -TM    Fed By Examiner    Consistency Thin;Nectar;Honey;Pudding;Soft Solid;Hard Solid  -TM    Response Spontaneous Cough;Other (comment)   piecemeal deglutition; gagging and some emesis  -TM    Oral Phase Impaired Physiology  Observed    Oral Phase X   extended prep time, ? due to partial dentition or anxiety   -TM    Pharyngeal Symptoms --   trace penetration 1x only; vallecular residue  -TM    VFSS Exam    Study Completed By SLP and Radiologist (comment)  -TM    Textures Presented Thin;Nectar;Pudding;Honey;Solid  -TM    Position During Study/Views Obtained Upright;Lateral View  -TM    Physiologic Impairments Noted on VFSS    Physiologic Impairments Noted on VFSS X   min oral phase, mild pharyngeal phase dysphagia  -TM    Reduced Tongue Base Retraction w/thin, pudding, and solids - mild  -TM    Increased Stage Transition Duration (Delay) piecemeal deglutition & delayed prep time possibly due to partial dentition vs. anxiety w/swallowing  -TM    Reduced Laryngeal Elevation minimally and intermittent  -TM    Reduced UES Opening mild  -TM    Other Physiologic Impairments esophageal dysmotility per RAD w/backflow   -TM    Symptoms    Residue Noted X   mild  -TM    Valleculae With these consistencies (comment)   thin, pudding, and solids  -TM    Compensatory Strategies X   multiple swallows to reduce backflow occurrence  -TM    Esophageal Phase    Per Radiology, the esophageal phase: X   esophageal dysmotility  -TM    Was characterized by esophageal dismotility Other (comment)   pt. has previous dx of GERD, HH, & esophageal dilitation   -TM    SLP Communication to Radiology    Severity Level of Dysphagia esophageal dysfunction;mild dysphagia   mild oropharyngeal dysphagia; esophageal dysfunction  -TM    Consistencies Aspirated/Penetrated penetrated:;nectar   trace, 1x only  -TM    Summary Statement Continued Minimal oral phase dysphagia with extended bolus prep time due to partial dentition and/or anxiety with swallowing including piecemeal deglutition.   Mild pharyngeal phase dysphagia due to mildly decreased tongue base retraction with vallecular residue and minimally reduced laryngeal elevation with one occurrence of trace laryngeal  penetration during the swallow.  Pt. did, however, exhibit esophageal dysmotility per RAD with backflow with pt. choking and gagging with some emesis.  Pt. is at high risk of aspiration from refluxate/backflow.  He has previous diagnoses of GERD and HH.  He would benefit from GI consult for further assessment of third stage dysphagia.  Recommend:  1. GI consult,  2. reg diet w/thin liq as marianela,  3. small bites/sips,  4. multiple swallows w/solids and liquids,  5. take time w/all po intake,  6. aspiration precautions,  7. reflux precautions.  D/W pt. w/verbalized understanding.  -TM    Results/Recs/Barriers/Education for Dysphagia    Factors Affecting Performance other   esophageal dysmotility & third stage dysphagia (GERD, HH)  -TM    Learning Motivation strong  -TM    Education/Learning Comments pt. verbalized understanding  -TM    Clinical Impression: Swallowing Mild:;oropharyngeal phase dysphagia;esophageal dysphagia  -TM    Impact on Function risk for aspiration   from backflow/refluxate  -TM    Recommendations for Diet/Nutirition full oral diet   with small bites/sips, multiple swallows, & take time w/po  -TM    Swallowing Precautions upright position for at least 30 minutes after meals;small sips and bites when eating;multiple swallows per bite or sip;cue to slow down;discuss medical management of reflux with Physician;other   GI consult  -TM    Upright Position for at Least 30 Minutes After Meals w/all po intake  -TM    Small Sips and Bites When Eating w/all po intake  -TM    Discuss Medical Management of Reflux with Physician GI consult  -TM    Multiple Swallows Per Bite or Sip w/all po intake  -TM    Cue to Slow Down pt. needs reminders  -TM    Other GI consult  -TM    Recommendations other   GI consult  -TM      User Key  (r) = Recorded By, (t) = Taken By, (c) = Cosigned By    Initials Name Provider Type    TM Emily Galdamez Speech and Language Pathologist                            OP SLP Education        09/27/17 1341    Education    Barriers to Learning No barriers identified  -TM    Education Provided Described results of evaluation;Patient expressed understanding of evaluation  -TM    Assessed Learning motivation  -TM    Learning Motivation Strong  -TM    Learning Method Explanation  -TM    Teaching Response Verbalized understanding  -TM      User Key  (r) = Recorded By, (t) = Taken By, (c) = Cosigned By    Initials Name Effective Dates    TM Emily [place] 10/26/16 -                     OP SLP Assessment/Plan - 09/27/17 1050     SLP Assessment    Clinical Impression: Swallowing Mild:;oropharyngeal phase dysphagia;esophageal dysphagia  -TM      User Key  (r) = Recorded By, (t) = Taken By, (c) = Cosigned By    Initials Name Provider Type    TM Emily [place] Speech and Language Pathologist                    Time Calculation:   SLP Start Time: 1050    Therapy Charges for Today     Code Description Service Date Service Provider Modifiers Qty    63739180970 HC ST SWALLOWING CURRENT STATUS 9/27/2017 Emily Denice GN, CI 1    25250816458 HC ST SWALLOWING PROJECTED 9/27/2017 Emily Denice GN, CI 1    72733155027 HC ST SWALLOWING DISCHARGE 9/27/2017 Emily [place] GN, CI 1    [id number] HC ST MOTION FLUORO EVAL SWALLOW 6 9/27/2017 Emily [place] GN 1          SLP G-Codes  Functional Limitations: Swallowing  Swallow Current Status (): At least 1 percent but less than 20 percent impaired, limited or restricted  Swallow Goal Status (): At least 1 percent but less than 20 percent impaired, limited or restricted  Swallow Discharge Status (): At least 1 percent but less than 20 percent impaired, limited or restricted        Emily  [place]  9/27/2017

## 2017-10-17 ENCOUNTER — OFFICE VISIT (OUTPATIENT)
Dept: GASTROENTEROLOGY | Facility: CLINIC | Age: 67
End: 2017-10-17

## 2017-10-17 VITALS
HEART RATE: 99 BPM | DIASTOLIC BLOOD PRESSURE: 71 MMHG | WEIGHT: 147 LBS | BODY MASS INDEX: 26.05 KG/M2 | TEMPERATURE: 97.9 F | RESPIRATION RATE: 18 BRPM | SYSTOLIC BLOOD PRESSURE: 134 MMHG | HEIGHT: 63 IN

## 2017-10-17 DIAGNOSIS — J44.9 CHRONIC OBSTRUCTIVE PULMONARY DISEASE, UNSPECIFIED COPD TYPE (HCC): ICD-10-CM

## 2017-10-17 DIAGNOSIS — R13.10 DYSPHAGIA, UNSPECIFIED TYPE: Primary | ICD-10-CM

## 2017-10-17 DIAGNOSIS — K62.5 BRBPR (BRIGHT RED BLOOD PER RECTUM): ICD-10-CM

## 2017-10-17 DIAGNOSIS — R63.4 WEIGHT LOSS: ICD-10-CM

## 2017-10-17 DIAGNOSIS — R12 HEARTBURN: ICD-10-CM

## 2017-10-17 PROCEDURE — 99204 OFFICE O/P NEW MOD 45 MIN: CPT | Performed by: INTERNAL MEDICINE

## 2017-10-17 RX ORDER — FAMOTIDINE 20 MG/1
20 TABLET, FILM COATED ORAL DAILY
COMMUNITY
End: 2017-10-31

## 2017-10-17 NOTE — PATIENT INSTRUCTIONS
1. Options were discussed with the patient in detail including an upper endoscopy.  It has been elected to proceed with barium swallow.  2. Dietary instructions.  The patient should eat relatively soft diet.  She should eat in upright position and chew well.  The patient should drink water after to 3 bites and take medications with liberal amounts of water.  Generally medications that have a potential to cause pill esophagitis may be avoided or used in an alternative form (for example if the patient needs potassium it may be used in a liquid rather than pill form).  Furthermore after eating, and taking medications the patient should remain in upright position for 5-10 minutes.  3. Pepcid 20 mg one by mouth twice a day.  4. Hemorrhoidal care.  5. The patient may call back regarding results of barium swallow.  6. Option of a follow-up appointment was given.  It is elected that the patient will call back.

## 2017-10-17 NOTE — PROGRESS NOTES
Chief Complaint   Patient presents with   • Difficulty Swallowing       Difficulty Swallowing   Associated symptoms include coughing, fatigue and weakness. Pertinent negatives include no abdominal pain, arthralgias, chest pain, chills, fever, headaches, joint swelling, myalgias, nausea, rash or vomiting.        Review of Systems   Constitutional: Positive for fatigue and unexpected weight change. Negative for appetite change, chills and fever.   HENT: Positive for nosebleeds and trouble swallowing. Negative for mouth sores.    Eyes: Negative for discharge and redness.   Respiratory: Positive for apnea, cough and shortness of breath.    Cardiovascular: Negative for chest pain, palpitations and leg swelling.   Gastrointestinal: Negative for abdominal distention, abdominal pain, anal bleeding, blood in stool, constipation, diarrhea, nausea and vomiting.   Endocrine: Negative for cold intolerance, heat intolerance and polydipsia.   Genitourinary: Negative for dysuria, hematuria and urgency.   Musculoskeletal: Positive for back pain. Negative for arthralgias, joint swelling and myalgias.   Skin: Negative for rash.   Allergic/Immunologic: Positive for food allergies. Negative for immunocompromised state.        MILK PRODUCTS   Neurological: Positive for syncope and weakness. Negative for dizziness, seizures and headaches.   Hematological: Negative for adenopathy. Bruises/bleeds easily.   Psychiatric/Behavioral: Negative for dysphoric mood. The patient is nervous/anxious. The patient is not hyperactive.      Patient Active Problem List   Diagnosis   • Anxiety   • Atherosclerotic heart disease of native coronary artery without angina pectoris   • Atrial fibrillation   • Chronic bronchitis   • Chronic kidney disease, stage III (moderate)   • Steroid-dependent COPD   • Degeneration of cervical intervertebral disc   • Diabetes mellitus   • GERD without esophagitis   • Diverticulosis   • Hemorrhoids   • Hiatal hernia   •  Hyperlipidemia   • Hypertension   • Kyphosis, acquired   • Mitral and aortic valve disease   • Phimosis   • Sleep apnea   • Enlarged prostate without lower urinary tract symptoms (luts)   • History of arthritis   • Back pain   • Depression   • Acute on chronic respiratory failure with hypoxia and hypercapnia   • Stroke syndrome   • History of ventricular septal defect   • Neck pain   • COPD exacerbation   • Pneumonia of right lower lobe due to infectious organism   • Impaired mobility and ADLs   • Physical deconditioning     Past Medical History:   Diagnosis Date   • Abnormal liver enzymes    • Acute bronchitis    • Anemia    • Anxiety    • Arthritis    • Atherosclerotic heart disease of native coronary artery without angina pectoris    • Atrial fibrillation    • Atypical chest pain    • Back pain    • BPH (benign prostatic hyperplasia)    • Cardiac pain    • Chronic bronchitis    • Chronic kidney disease    • COPD (chronic obstructive pulmonary disease)    • Degeneration of cervical intervertebral disc    • Depression    • Diabetes mellitus    • Diverticulosis    • Dyspnea    • Esophageal reflux    • Esophagitis    • Gastritis    • Hematuria    • Hemorrhoids    • Hiatal hernia    • History of arthritis    • History of myocardial infarction    • History of peripheral neuropathy    • History of ventricular septal defect    • History of ventricular septal defect    • Hyperlipidemia    • Hypertension    • Infectious diarrhea    • Infectious diarrhea    • Kyphosis, acquired    • Lumbar radiculopathy    • Mitral and aortic valve disease    • Myocardial infarction    • Nephrolithiasis    • Phimosis    • Respiratory failure    • Sleep apnea    • Stroke syndrome    • Stroke syndrome    • Urinary calculus    • Urinary tract infection    • Ventral hernia      Past Surgical History:   Procedure Laterality Date   • HERNIA REPAIR     • SUPRAPUBIC CATHETER INSERTION      History of Percutaneous Catheter Placement Into Ureter   •  THROAT SURGERY     • VSD REPAIR      History of VSD Repair By Patch Closure     Family History   Problem Relation Age of Onset   • Other Father      CABG   • Coronary artery disease Father      Social History   Substance Use Topics   • Smoking status: Former Smoker     Quit date: 2017   • Smokeless tobacco: Never Used   • Alcohol use No       Current Outpatient Prescriptions:   •  alfuzosin (UROXATRAL) 10 MG 24 hr tablet, Take 1 tablet by mouth Daily., Disp: 30 tablet, Rfl: 5  •  ALPRAZolam (XANAX) 0.5 MG tablet, Take 1 tablet by mouth At Night As Needed for Anxiety., Disp: 3 tablet, Rfl: 0  •  amLODIPine (NORVASC) 5 MG tablet, TAKE ONE TABLET BY MOUTH DAILY, Disp: 30 tablet, Rfl: 5  •  atorvastatin (LIPITOR) 40 MG tablet, Take 1 tablet by mouth Daily., Disp: 30 tablet, Rfl: 3  •  benzonatate (TESSALON) 200 MG capsule, Take 1 capsule by mouth 3 (Three) Times a Day As Needed for Cough., Disp: , Rfl:   •  BREO ELLIPTA 200-25 MCG/INH aerosol powder , Inhale 1 puff Daily., Disp: , Rfl: 10  •  budesonide (PULMICORT) 0.5 MG/2ML nebulizer solution, Take 2 mL by nebulization 2 (Two) Times a Day., Disp: , Rfl:   •  clopidogrel (PLAVIX) 75 MG tablet, Take 1 tablet by mouth Daily., Disp: 90 tablet, Rfl: 1  •  cyclobenzaprine (FLEXERIL) 10 MG tablet, Take 0.5 tablets by mouth At Night As Needed for Muscle Spasms., Disp: 30 tablet, Rfl: 0  •  escitalopram (LEXAPRO) 10 MG tablet, Take 1 tablet by mouth Daily. (Patient taking differently: Take 10 mg by mouth Every Evening.), Disp: 90 tablet, Rfl: 1  •  famotidine (PEPCID) 20 MG tablet, Take 20 mg by mouth Daily., Disp: , Rfl:   •  finasteride (PROSCAR) 5 MG tablet, Take 5 mg by mouth Daily., Disp: , Rfl:   •  fluticasone (FLONASE) 50 MCG/ACT nasal spray, 1 spray Daily., Disp: , Rfl:   •  HYDROcod Polst-CPM Polst ER (TUSSIONEX PENNKINETIC) 10-8 MG/5ML ER suspension, Take 5 mL by mouth Every 12 (Twelve) Hours As Needed for Cough., Disp: 30 mL, Rfl: 0  •  ipratropium-albuterol  "(DUO-NEB) 0.5-2.5 mg/mL nebulizer, Take 3 mL by nebulization Every 6 (Six) Hours., Disp: 360 mL, Rfl:   •  losartan (COZAAR) 100 MG tablet, Take 1 tablet by mouth daily., Disp: 90 tablet, Rfl: 3  •  Multiple Vitamin (MULTI-VITAMIN DAILY PO), Take  by mouth Daily., Disp: , Rfl:   •  nitroglycerin (NITROSTAT) 0.4 MG SL tablet, Place  under the tongue., Disp: , Rfl:   •  oxybutynin (DITROPAN) 5 MG tablet, TAKE ONE TABLET BY MOUTH AT BEDTIME, Disp: 30 tablet, Rfl: 5  •  predniSONE (DELTASONE) 10 MG tablet, 4 tabs daily X 3 days then 2 tabs daily X 3 days then 1 tab daily X 3 days then stop., Disp: , Rfl:   •  PROAIR  (90 BASE) MCG/ACT inhaler, Inhale 2 puffs Every 4 (Four) Hours As Needed for Wheezing or Shortness of Air., Disp: , Rfl:   •  pseudoephedrine-guaifenesin (MUCINEX D)  MG per 12 hr tablet, Take 1 tablet by mouth Every 12 (Twelve) Hours., Disp: 10 tablet, Rfl: 0  •  spironolactone (ALDACTONE) 12.5 MG tablet, Take 2 quarter tablet by mouth Daily., Disp: , Rfl:   •  traMADol (ULTRAM) 50 MG tablet, Take 1 tablet by mouth Every 6 (Six) Hours As Needed for Moderate Pain ., Disp: 10 tablet, Rfl: 0  •  carvedilol (COREG) 3.125 MG tablet, TAKE ONE TABLET BY MOUTH TWICE A DAY WITH MEALS (Patient taking differently: TAKE ONE TABLET BY MOUTH TWICE A DAY WITH MEALS      6.25 bid), Disp: 60 tablet, Rfl: 2  •  insulin aspart (novoLOG) 100 UNIT/ML injection, Inject 0-7 Units under the skin 4 (Four) Times a Day Before Meals & at Bedtime., Disp: , Rfl: 12    Allergies   Allergen Reactions   • Milk-Related Compounds    • Sulfa Antibiotics Other (See Comments), Nausea Only and Nausea And Vomiting       /71  Pulse 99  Temp 97.9 °F (36.6 °C)  Resp 18  Ht 63\" (160 cm)  Wt 147 lb (66.7 kg)  BMI 26.04 kg/m2    Physical Exam  Stigmata of chronic liver disease:  None.  Asterixis:  None.    Laboratory Results:  Upon review of records:      Abdominal Imaging:  Upon review of records:      Procedures:  Upon " review of records:      Notes:    Assessment and Plan:    There are no diagnoses linked to this encounter.    Plan  and Patient Instructions:  There are no Patient Instructions on file for this visit.  Torres Grant MA

## 2017-10-17 NOTE — PROGRESS NOTES
Chief Complaint   Patient presents with   • Difficulty Swallowing       History of Present Illness     The patient has difficulty swallowing off and for the last 6 months.  The symptom is moderate in severity, occurs occasionally perhaps once or twice a week and is mostly associated with solid foods.  The symptoms are not progressive.  The patient points towards the lower neck area.  The patient had lost about 42 pounds over the last 16 months.  The patient denies abdominal pain.  There is no nausea or vomiting.  He denies overt GI bleed (hematemesis, melena, or hematochezia).  The patient denies significant coughing with eating or drinking.    The patient has a history of reflux off and on for the last several years.  The reflux is moderately severe.  Symptoms are described as retrosternal burning sensation, and indigestion.  There is history of occasional regurgitative symptoms.  Frequency being several times per week.  The symptoms are worse at night.  Currently, the patient is taking Pepcid 20 mg twice a day with reasonable control of his symptoms.      The patient has history of bright red blood per rectum off and on for the last several months.  This is described as mild.  Quantity being a couple of drops at a time.  Frequency being 1-2 times a month.  There is no history of associated dizziness.  The patient denies anorectal pain.        Of interest the patient has been hospitalized recently with significant shortness of breath secondary to acute exacerbation of COPD.  The patient was found to have oxyntic, and hypercapnic respiratory failure.  Currently the patient is on supplemental oxygen at 4 L nasal cannula.  The patient claims that he has been running oxygen saturation around 90-92%.  There is no history of liver disease or pancreatitis in the past.     Review of Systems   Constitutional: Positive for appetite change and unexpected weight change. Negative for chills, fatigue and fever.   HENT: Positive  for trouble swallowing. Negative for mouth sores and nosebleeds.    Eyes: Negative for discharge and redness.   Respiratory: Positive for cough and shortness of breath. Negative for apnea.    Cardiovascular: Negative for chest pain, palpitations and leg swelling.   Gastrointestinal: Negative for abdominal distention, abdominal pain, anal bleeding, blood in stool, constipation, diarrhea, nausea and vomiting.   Endocrine: Negative for cold intolerance, heat intolerance and polydipsia.   Genitourinary: Negative for dysuria, hematuria and urgency.   Musculoskeletal: Positive for back pain. Negative for arthralgias, joint swelling and myalgias.   Skin: Negative for rash.   Allergic/Immunologic: Positive for food allergies. Negative for immunocompromised state.        MILK [employer]ODUCTS   Neurological: Positive for syncope and weakness. Negative for dizziness, seizures and headaches.   Hematological: Negative for adenopathy. Bruises/bleeds easily.   Psychiatric/Behavioral: Negative for dysphoric mood. The patient is nervous/anxious. The patient is not hyperactive.      Patient Active Problem List   Diagnosis   • Anxiety   • Atherosclerotic heart disease of native coronary artery without angina pectoris   • Atrial fibrillation   • Chronic bronchitis   • Chronic kidney disease, stage III (moderate)   • Steroid-dependent COPD   • Degeneration of cervical intervertebral disc   • Diabetes mellitus   • GERD without esophagitis   • Diverticulosis   • Hemorrhoids   • Hiatal hernia   • Hyperlipidemia   • Hypertension   • Kyphosis, acquired   • Mitral and aortic valve disease   • Phimosis   • Sleep apnea   • Enlarged prostate without lower urinary tract symptoms (luts)   • History of arthritis   • Back pain   • Depression   • Acute on chronic respiratory failure with hypoxia and hypercapnia   • Stroke syndrome   • History of ventricular septal defect   • Neck pain   • COPD exacerbation   • Pneumonia of right lower lobe due to infectious  organism   • Impaired mobility and ADLs   • Physical deconditioning     Past Medical History:   Diagnosis Date   • Abnormal liver enzymes    • Acute bronchitis    • Anemia    • Anxiety    • Arthritis    • Atherosclerotic heart disease of native coronary artery without angina pectoris    • Atrial fibrillation    • Atypical chest pain    • Back pain    • BPH (benign prostatic hyperplasia)    • Cardiac pain    • Chronic bronchitis    • Chronic kidney disease    • COPD (chronic obstructive pulmonary disease)    • Degeneration of cervical intervertebral disc    • Depression    • Diabetes mellitus    • Diverticulosis    • Dyspnea    • Esophageal reflux    • Esophagitis    • Gastritis    • Hematuria    • Hemorrhoids    • Hiatal hernia    • History of arthritis    • History of myocardial infarction    • History of peripheral neuropathy    • History of ventricular septal defect    • History of ventricular septal defect    • Hyperlipidemia    • Hypertension    • Infectious diarrhea    • Infectious diarrhea    • Kyphosis, acquired    • Lumbar radiculopathy    • Mitral and aortic valve disease    • Myocardial infarction    • Nephrolithiasis    • Phimosis    • Respiratory failure    • Sleep apnea    • Stroke syndrome    • Stroke syndrome    • Urinary calculus    • Urinary tract infection    • Ventral hernia      Past Surgical History:   Procedure Laterality Date   • HERNIA REPAIR     • SUPRAPUBIC CATHETER INSERTION      History of Percutaneous Catheter Placement Into Ureter   • THROAT SURGERY     • VSD REPAIR      History of VSD Repair By Patch Closure     Family History   Problem Relation Age of Onset   • Other Father      CABG   • Coronary artery disease Father      Social History   Substance Use Topics   • Smoking status: Former Smoker     Quit date: 2017   • Smokeless tobacco: Never Used   • Alcohol use No       Current Outpatient Prescriptions:   •  alfuzosin (UROXATRAL) 10 MG 24 hr tablet, Take 1 tablet by mouth Daily.,  Disp: 30 tablet, Rfl: 5  •  ALPRAZolam (XANAX) 0.5 MG tablet, Take 1 tablet by mouth At Night As Needed for Anxiety., Disp: 3 tablet, Rfl: 0  •  amLODIPine (NORVASC) 5 MG tablet, TAKE ONE TABLET BY MOUTH DAILY, Disp: 30 tablet, Rfl: 5  •  atorvastatin (LIPITOR) 40 MG tablet, Take 1 tablet by mouth Daily., Disp: 30 tablet, Rfl: 3  •  benzonatate (TESSALON) 200 MG capsule, Take 1 capsule by mouth 3 (Three) Times a Day As Needed for Cough., Disp: , Rfl:   •  BREO ELLIPTA 200-25 MCG/INH aerosol powder , Inhale 1 puff Daily., Disp: , Rfl: 10  •  budesonide (PULMICORT) 0.5 MG/2ML nebulizer solution, Take 2 mL by nebulization 2 (Two) Times a Day., Disp: , Rfl:   •  clopidogrel (PLAVIX) 75 MG tablet, Take 1 tablet by mouth Daily., Disp: 90 tablet, Rfl: 1  •  cyclobenzaprine (FLEXERIL) 10 MG tablet, Take 0.5 tablets by mouth At Night As Needed for Muscle Spasms., Disp: 30 tablet, Rfl: 0  •  escitalopram (LEXAPRO) 10 MG tablet, Take 1 tablet by mouth Daily. (Patient taking differently: Take 10 mg by mouth Every Evening.), Disp: 90 tablet, Rfl: 1  •  famotidine (PEPCID) 20 MG tablet, Take 20 mg by mouth Daily., Disp: , Rfl:   •  finasteride (PROSCAR) 5 MG tablet, Take 5 mg by mouth Daily., Disp: , Rfl:   •  fluticasone (FLONASE) 50 MCG/ACT nasal spray, 1 spray Daily., Disp: , Rfl:   •  HYDROcod Polst-CPM Polst ER (TUSSIONEX PENNKINETIC) 10-8 MG/5ML ER suspension, Take 5 mL by mouth Every 12 (Twelve) Hours As Needed for Cough., Disp: 30 mL, Rfl: 0  •  ipratropium-albuterol (DUO-NEB) 0.5-2.5 mg/mL nebulizer, Take 3 mL by nebulization Every 6 (Six) Hours., Disp: 360 mL, Rfl:   •  losartan (COZAAR) 100 MG tablet, Take 1 tablet by mouth daily., Disp: 90 tablet, Rfl: 3  •  Multiple Vitamin (MULTI-VITAMIN DAILY PO), Take  by mouth Daily., Disp: , Rfl:   •  nitroglycerin (NITROSTAT) 0.4 MG SL tablet, Place  under the tongue., Disp: , Rfl:   •  oxybutynin (DITROPAN) 5 MG tablet, TAKE ONE TABLET BY MOUTH AT BEDTIME, Disp: 30 tablet,  "Rfl: 5  •  predniSONE (DELTASONE) 10 MG tablet, 4 tabs daily X 3 days then 2 tabs daily X 3 days then 1 tab daily X 3 days then stop., Disp: , Rfl:   •  PROAIR  (90 BASE) MCG/ACT inhaler, Inhale 2 puffs Every 4 (Four) Hours As Needed for Wheezing or Shortness of Air., Disp: , Rfl:   •  pseudoephedrine-guaifenesin (MUCINEX D)  MG per 12 hr tablet, Take 1 tablet by mouth Every 12 (Twelve) Hours., Disp: 10 tablet, Rfl: 0  •  spironolactone (ALDACTONE) 12.5 MG tablet, Take 2 quarter tablet by mouth Daily., Disp: , Rfl:   •  traMADol (ULTRAM) 50 MG tablet, Take 1 tablet by mouth Every 6 (Six) Hours As Needed for Moderate Pain ., Disp: 10 tablet, Rfl: 0  •  carvedilol (COREG) 3.125 MG tablet, TAKE ONE TABLET BY MOUTH TWICE A DAY WITH MEALS (Patient taking differently: TAKE ONE TABLET BY MOUTH TWICE A DAY WITH MEALS      6.25 bid), Disp: 60 tablet, Rfl: 2  •  insulin aspart (novoLOG) 100 UNIT/ML injection, Inject 0-7 Units under the skin 4 (Four) Times a Day Before Meals & at Bedtime., Disp: , Rfl: 12    Allergies   Allergen Reactions   • Milk-Related Compounds    • Sulfa Antibiotics Other (See Comments), Nausea Only and Nausea And Vomiting       Blood pressure 134/71, pulse 99, temperature 97.9 °F (36.6 °C), resp. rate 18, height 63\" (160 cm), weight 147 lb (66.7 kg).    Physical Exam   Constitutional: He is oriented to person, place, and time. He appears well-developed and well-nourished. No distress.   HENT:   Head: Normocephalic and atraumatic.   Right Ear: Hearing and external ear normal.   Left Ear: Hearing and external ear normal.   Nose: Nose normal.   Mouth/Throat: Oropharynx is clear and moist and mucous membranes are normal. Mucous membranes are not pale, not dry and not cyanotic. No oral lesions. No oropharyngeal exudate.   Eyes: Conjunctivae and EOM are normal. Right eye exhibits no discharge. Left eye exhibits no discharge. No scleral icterus.   Neck: Trachea normal. Neck supple. No JVD present. " No edema present. No thyroid mass and no thyromegaly present.   Cardiovascular: Normal rate, regular rhythm, S2 normal and normal heart sounds.  Exam reveals no gallop, no S3 and no friction rub.    No murmur heard.  Pulmonary/Chest: Effort normal and breath sounds normal. No respiratory distress. He has no wheezes. He has no rales. He exhibits no tenderness.   Abdominal: Soft. Normal appearance and bowel sounds are normal. He exhibits no distension, no ascites and no mass. There is no splenomegaly or hepatomegaly. There is no tenderness. There is no rigidity, no rebound and no guarding. No hernia.   Musculoskeletal: He exhibits no tenderness or deformity.       Vascular Status -  His exam exhibits no right foot edema. His exam exhibits no left foot edema.  Lymphadenopathy:     He has no cervical adenopathy.        Left: No supraclavicular adenopathy present.   Neurological: He is alert and oriented to person, place, and time. He has normal strength. No cranial nerve deficit or sensory deficit. He exhibits normal muscle tone. Coordination normal.   Skin: No rash noted. He is not diaphoretic. No cyanosis. No pallor. Nails show no clubbing.   Psychiatric: He has a normal mood and affect. His behavior is normal. Judgment and thought content normal.   Nursing note and vitals reviewed.    Stigmata of chronic liver disease:  None.  Asterixis:  None.    Laboratory Testing:  Upon review of medical records:    Dated September 8, 2017 sodium 145 potassium 4.4 chloride 96 CO2 42 BUN 26 serum creatinine 1.10 glucose 127.  Calcium 10.5.  Albumin 3.90.  T bili 1.3 AST 31 ALT 37 alkaline phosphatase 90.  Total protein 6.6.  WBC 15.84 hemoglobin 12.2 hematocrit 39.8 MCV 95.9 and platelet count 175.    Dated September 16, 2017 sodium 138 potassium 4.5 chloride 99 CO2 32 BUN 43 serum creatinine 1.00 glucose 110.  Calcium 9.0.  WBC 16.73 hemoglobin 11.1 hematocrit 34.0 MCV 89.7 platelet count 229.     Procedure:  Upon review of  medical records:     Dated January 3, 2013 the patient underwent a colonoscopy in [facility] by Dr. Posadas which revealed: Left-sided colonic diverticulosis.  Internal and external hemorrhoids.  Otherwise normal colonoscopy procedure.    Dated June 24, 2013 the patient underwent an upper endoscopy which revealed:  Erythematous/erosive gastritis with evidence of healed ulceration, erythematous distal esophagitis, a small sliding hiatal hernia less than 3 cm, polypoid area at the duodenal bulb.  Prominent redundant folds were noted within the stomach toward the greater curvature.  These were flipped using the biopsy forceps.  Erosions were noted.  No Brown mucosa was seen.  Tortuosity and tertiary contractions of the esophageal body was noted.  No strictures or Schatzki’s ring was seen.  The likely cause of intermittent dysphagia is esophageal dysmotility.  Duodenal bulb, polypoid area, biopsy revealed active duodenitis.  Second portion of duodenum, biopsy revealed no pathologic alterations.  Healed ulcer edge, biopsies revealed mild chronic gastritis, negative for Helicobacter species.  Gastric body, prominent folds, biopsies revealed mild chronic gastritis, negative for Helicobacter species.      Dated September 21, 2017 the patient had undergone modified barium swallow which revealed minimal abnormality.    Assessment and Plan:      Jani was seen today for difficulty swallowing.    Diagnoses and all orders for this visit:    Dysphagia, unspecified type  Comments:  Dysphagia.  With history of significant weight loss concerns regarding underlying stricture, malignancy, and achalasia.  Orders:  -     FL Esophagram Complete    Heartburn  Comments:  Stable.    Weight loss  Comments:  Unintentional significant progressive weight loss.    Chronic obstructive pulmonary disease, unspecified COPD type  Comments:  Advanced COPD.    BRBPR (bright red blood per rectum)  Comments:  Likely hemorrhoidal.        Plan   and Patient Instructions:    Patient Instructions     1. Options were discussed with the patient in detail including an upper endoscopy.  It has been elected to proceed with barium swallow.  2. Dietary instructions.  The patient should eat relatively soft diet.  She should eat in upright position and chew well.  The patient should drink water after to 3 bites and take medications with liberal amounts of water.  Generally medications that have a potential to cause pill esophagitis may be avoided or used in an alternative form (for example if the patient needs potassium it may be used in a liquid rather than pill form).  Furthermore after eating, and taking medications the patient should remain in upright position for 5-10 minutes.  3. Pepcid 20 mg one by mouth twice a day.  4. Hemorrhoidal care.  5. The patient may call back regarding results of barium swallow.  6. Option of a follow-up appointment was given.  It is elected that the patient will call back.        Gregory Worthy MD

## 2017-10-23 ENCOUNTER — OFFICE VISIT (OUTPATIENT)
Dept: INTERNAL MEDICINE | Facility: CLINIC | Age: 67
End: 2017-10-23

## 2017-10-23 VITALS
TEMPERATURE: 98.1 F | SYSTOLIC BLOOD PRESSURE: 120 MMHG | HEART RATE: 96 BPM | BODY MASS INDEX: 25.34 KG/M2 | RESPIRATION RATE: 14 BRPM | WEIGHT: 143 LBS | HEIGHT: 63 IN | OXYGEN SATURATION: 98 % | DIASTOLIC BLOOD PRESSURE: 70 MMHG

## 2017-10-23 DIAGNOSIS — I10 ESSENTIAL HYPERTENSION: ICD-10-CM

## 2017-10-23 DIAGNOSIS — E78.5 HYPERLIPIDEMIA, UNSPECIFIED HYPERLIPIDEMIA TYPE: ICD-10-CM

## 2017-10-23 DIAGNOSIS — J42 CHRONIC BRONCHITIS, UNSPECIFIED CHRONIC BRONCHITIS TYPE (HCC): ICD-10-CM

## 2017-10-23 DIAGNOSIS — F32.A DEPRESSION, UNSPECIFIED DEPRESSION TYPE: ICD-10-CM

## 2017-10-23 DIAGNOSIS — N18.30 CHRONIC KIDNEY DISEASE, STAGE III (MODERATE) (HCC): ICD-10-CM

## 2017-10-23 DIAGNOSIS — F41.9 ANXIETY: ICD-10-CM

## 2017-10-23 DIAGNOSIS — K21.9 GERD WITHOUT ESOPHAGITIS: ICD-10-CM

## 2017-10-23 DIAGNOSIS — E11.8 TYPE 2 DIABETES MELLITUS WITH COMPLICATION, WITHOUT LONG-TERM CURRENT USE OF INSULIN (HCC): ICD-10-CM

## 2017-10-23 DIAGNOSIS — I25.10 ATHEROSCLEROSIS OF NATIVE CORONARY ARTERY OF NATIVE HEART WITHOUT ANGINA PECTORIS: Primary | ICD-10-CM

## 2017-10-23 DIAGNOSIS — J44.9 STEROID-DEPENDENT COPD (HCC): ICD-10-CM

## 2017-10-23 PROBLEM — J44.1 COPD EXACERBATION (HCC): Status: RESOLVED | Noted: 2017-05-03 | Resolved: 2017-10-23

## 2017-10-23 PROBLEM — J18.9 PNEUMONIA OF RIGHT LOWER LOBE DUE TO INFECTIOUS ORGANISM: Status: RESOLVED | Noted: 2017-09-08 | Resolved: 2017-10-23

## 2017-10-23 PROCEDURE — 99214 OFFICE O/P EST MOD 30 MIN: CPT | Performed by: INTERNAL MEDICINE

## 2017-10-23 RX ORDER — BACLOFEN 10 MG/1
10 TABLET ORAL 3 TIMES DAILY
Qty: 45 TABLET | Refills: 0 | Status: SHIPPED | OUTPATIENT
Start: 2017-10-23 | End: 2018-01-11 | Stop reason: HOSPADM

## 2017-10-23 NOTE — PROGRESS NOTES
Subjective   Thurman Mendel Parsons is a 67 y.o. male.     Chief Complaint   Patient presents with   • Follow-up       History of Present Illness   Patient here for follow-up of.  Recent hospitalization month ago due to COPD exacerbation patient is doing better now still see by lung  Dr. still has baseline short of breath and no cough.  Blood pressure stable medication.  Oxygenation today 98% on 4 L oxygen.  Also complains left in neck pain more than months Tylenol no help.  Achy in nature.  Patient also complains of swallow problem patient was seen by gastroenterologist modified barium swallow showed a mild swallow problem    Current Outpatient Prescriptions:   •  alfuzosin (UROXATRAL) 10 MG 24 hr tablet, Take 1 tablet by mouth Daily., Disp: 30 tablet, Rfl: 5  •  ALPRAZolam (XANAX) 0.5 MG tablet, Take 1 tablet by mouth At Night As Needed for Anxiety., Disp: 3 tablet, Rfl: 0  •  amLODIPine (NORVASC) 5 MG tablet, TAKE ONE TABLET BY MOUTH DAILY, Disp: 30 tablet, Rfl: 5  •  atorvastatin (LIPITOR) 40 MG tablet, Take 1 tablet by mouth Daily., Disp: 30 tablet, Rfl: 3  •  benzonatate (TESSALON) 200 MG capsule, Take 1 capsule by mouth 3 (Three) Times a Day As Needed for Cough., Disp: , Rfl:   •  BREO ELLIPTA 200-25 MCG/INH aerosol powder , Inhale 1 puff Daily., Disp: , Rfl: 10  •  budesonide (PULMICORT) 0.5 MG/2ML nebulizer solution, Take 2 mL by nebulization 2 (Two) Times a Day., Disp: , Rfl:   •  carvedilol (COREG) 3.125 MG tablet, TAKE ONE TABLET BY MOUTH TWICE A DAY WITH MEALS (Patient taking differently: TAKE ONE TABLET BY MOUTH TWICE A DAY WITH MEALS      6.25 bid), Disp: 60 tablet, Rfl: 2  •  clopidogrel (PLAVIX) 75 MG tablet, Take 1 tablet by mouth Daily., Disp: 90 tablet, Rfl: 1  •  cyclobenzaprine (FLEXERIL) 10 MG tablet, Take 0.5 tablets by mouth At Night As Needed for Muscle Spasms., Disp: 30 tablet, Rfl: 0  •  escitalopram (LEXAPRO) 10 MG tablet, Take 1 tablet by mouth Daily. (Patient taking differently: Take  10 mg by mouth Every Evening.), Disp: 90 tablet, Rfl: 1  •  famotidine (PEPCID) 20 MG tablet, Take 20 mg by mouth Daily., Disp: , Rfl:   •  finasteride (PROSCAR) 5 MG tablet, Take 5 mg by mouth Daily., Disp: , Rfl:   •  fluticasone (FLONASE) 50 MCG/ACT nasal spray, 1 spray Daily., Disp: , Rfl:   •  HYDROcod Polst-CPM Polst ER (TUSSIONEX PENNKINETIC) 10-8 MG/5ML ER suspension, Take 5 mL by mouth Every 12 (Twelve) Hours As Needed for Cough., Disp: 30 mL, Rfl: 0  •  insulin aspart (novoLOG) 100 UNIT/ML injection, Inject 0-7 Units under the skin 4 (Four) Times a Day Before Meals & at Bedtime., Disp: , Rfl: 12  •  ipratropium-albuterol (DUO-NEB) 0.5-2.5 mg/mL nebulizer, Take 3 mL by nebulization Every 6 (Six) Hours., Disp: 360 mL, Rfl:   •  losartan (COZAAR) 100 MG tablet, Take 1 tablet by mouth daily., Disp: 90 tablet, Rfl: 3  •  Multiple Vitamin (MULTI-VITAMIN DAILY PO), Take  by mouth Daily., Disp: , Rfl:   •  nitroglycerin (NITROSTAT) 0.4 MG SL tablet, Place  under the tongue., Disp: , Rfl:   •  oxybutynin (DITROPAN) 5 MG tablet, TAKE ONE TABLET BY MOUTH AT BEDTIME, Disp: 30 tablet, Rfl: 5  •  predniSONE (DELTASONE) 10 MG tablet, 4 tabs daily X 3 days then 2 tabs daily X 3 days then 1 tab daily X 3 days then stop., Disp: , Rfl:   •  PROAIR  (90 BASE) MCG/ACT inhaler, Inhale 2 puffs Every 4 (Four) Hours As Needed for Wheezing or Shortness of Air., Disp: , Rfl:   •  pseudoephedrine-guaifenesin (MUCINEX D)  MG per 12 hr tablet, Take 1 tablet by mouth Every 12 (Twelve) Hours., Disp: 10 tablet, Rfl: 0  •  spironolactone (ALDACTONE) 12.5 MG tablet, Take 2 quarter tablet by mouth Daily., Disp: , Rfl:   •  traMADol (ULTRAM) 50 MG tablet, Take 1 tablet by mouth Every 6 (Six) Hours As Needed for Moderate Pain ., Disp: 10 tablet, Rfl: 0    The following portions of the patient's history were reviewed and updated as appropriate: allergies, current medications, past family history, past medical history, past social  history, past surgical history and problem list.    Review of Systems   Constitutional: Negative.    Respiratory: Positive for cough, shortness of breath and wheezing.    Cardiovascular: Negative.    Gastrointestinal: Negative.    Musculoskeletal: Negative.    Skin: Negative.    Neurological: Negative.    Psychiatric/Behavioral: Negative.        Objective   Physical Exam   Constitutional: He is oriented to person, place, and time. He appears well-developed and well-nourished.   HENT:   Head: Normocephalic and atraumatic.   Eyes: Conjunctivae are normal. Pupils are equal, round, and reactive to light.   Neck: Neck supple.   Cardiovascular: Normal rate, regular rhythm and normal heart sounds.    Pulmonary/Chest:   Effort and BS decrease   Abdominal: Bowel sounds are normal.   Musculoskeletal: He exhibits tenderness (left trapezium muscle tenderness).   Neurological: He is alert and oriented to person, place, and time.   Skin: Skin is warm.   Psychiatric: He has a normal mood and affect.       All tests have been reviewed.    Assessment/Plan   There are no diagnoses linked to this encounter.          COPD continue inhaler chronic prednisone 10mg. Follow up with lung doctor this PM, continue meds and O2  Dysphagia MBS mild , follow GI  Left neck pain start baclofen   osteopenia, oscal D 500mg bid continue  Weight loss  watch  left low back pain follow up with ortho s/p left hip surgery for severe OA doing well.   alkaline phosphatase still high watch---  divertic hemorroid on colon 1/2013  DM stable without med  phimosis seen by uro declined surgery  Hematuria followup with urologist s/p cystoscope bladder stones  Hypertension continue meds  BPH enlarged on CT scan follow up with urologist.continue flomax   Anemia workup all negative.normal now. It is anemia of chronic disease due to chronic disease   CKD follow up with kidney doctor  History of the CAD atrial fibrillation VSD status post occlusion stable now. Status  post a defibrillator seen by cardio follow up with cardio  History of a TIA patient is on Plavix continue  pneumovax done, zostavax to pharmacy, prevnar 13 1/19/17, Td to HD.  Depression cont lexapro 20  Decline to disrobe for PE  right inquinal hernia decline surgery now, call if worse  Neck pain chiro for now  3 week after labs

## 2017-10-24 ENCOUNTER — HOSPITAL ENCOUNTER (OUTPATIENT)
Dept: GENERAL RADIOLOGY | Facility: HOSPITAL | Age: 67
Discharge: HOME | End: 2017-10-24
Attending: INTERNAL MEDICINE | Admitting: INTERNAL MEDICINE

## 2017-10-24 PROCEDURE — 74220 X-RAY XM ESOPHAGUS 1CNTRST: CPT

## 2017-10-26 ENCOUNTER — TELEPHONE (OUTPATIENT)
Dept: GASTROENTEROLOGY | Facility: CLINIC | Age: 67
End: 2017-10-26

## 2017-10-26 NOTE — TELEPHONE ENCOUNTER
----- Message from Yulia Koroma sent at 10/26/2017 12:19 PM EDT -----  WANTS RESULTS ON HIS BARIUM SWALLOW      Called patient, discussed results. He has appointment next week for follow up to discuss with Dr. Worthy.

## 2017-10-31 ENCOUNTER — OFFICE VISIT (OUTPATIENT)
Dept: GASTROENTEROLOGY | Facility: CLINIC | Age: 67
End: 2017-10-31

## 2017-10-31 VITALS
TEMPERATURE: 97.6 F | BODY MASS INDEX: 25.34 KG/M2 | WEIGHT: 143 LBS | RESPIRATION RATE: 18 BRPM | HEIGHT: 63 IN | HEART RATE: 105 BPM | DIASTOLIC BLOOD PRESSURE: 78 MMHG | SYSTOLIC BLOOD PRESSURE: 140 MMHG

## 2017-10-31 DIAGNOSIS — R13.19 OTHER DYSPHAGIA: ICD-10-CM

## 2017-10-31 DIAGNOSIS — R12 HEARTBURN: Primary | ICD-10-CM

## 2017-10-31 PROCEDURE — 99214 OFFICE O/P EST MOD 30 MIN: CPT | Performed by: INTERNAL MEDICINE

## 2017-10-31 RX ORDER — PANTOPRAZOLE SODIUM 20 MG/1
20 TABLET, DELAYED RELEASE ORAL
Qty: 30 TABLET | Refills: 6 | Status: SHIPPED | OUTPATIENT
Start: 2017-10-31 | End: 2018-02-19

## 2017-10-31 NOTE — PATIENT INSTRUCTIONS
1. Anti-reflex measures.  2. Dietary instructions.  The patient should eat relatively soft diet.  Meat is best consumed as ground, vegetables as steamed and mashed and fruit as blenderized.  Bread, take and cookies may be consumed after depending in milk, coffee or tea.  He should eat in upright position and chew well.  The patient should drink water after to 3 bites and take medications with liberal amounts of water.  Generally medications that have a potential to cause pill esophagitis may be avoided or used in an alternative form (for example if the patient needs potassium it may be used in a liquid rather than pill form).  Similarly aspirin can be switched to chewable aspirin.  Furthermore after eating, and taking medications the patient should remain in upright position for 5-10 minutes.  3. Options were discussed with the patient in detail.  He will observe dietary precautions.  4. May discontinue famotidine.  5. Pantoprazole 40 mg 1 by mouth every morning one half hour before breakfast.  The patient may take Plavix at night.  6. Follow-up:  The patient may call back regarding progress in 1-2 weeks.  Follow-up otherwise in 6 weeks or so.

## 2017-10-31 NOTE — PROGRESS NOTES
Chief Complaint   Patient presents with   • Follow-up       History of Present Illness   The patient has difficulty swallowing off and for the last 6 months . The symptom is mild in severity, occurs occasionally perhaps once or twice a month and is mostly associated with solid foods.  The symptoms are not progressive.  The patient points towards the lower substernal area.  There is no associated weight loss.    The patient has a history of reflux off and on for the last several years.  The reflux is moderately severe.  Symptoms are described as retrosternal burning sensation, and indigestion.  There is history of occasional regurgitative symptoms.  Frequency being several times per week.  The symptoms are worse at night.  The patient takes acid suppressive therapy with Breakthrough symptoms.  There is no further bright red blood per rectum.  The patient denies abdominal pain.  There is no nausea or vomiting.  There is no diarrhea or constipation.  There is no history of liver disease or pancreatitis in the past.       Review of Systems   Constitutional: Positive for appetite change. Negative for chills, fatigue, fever and unexpected weight change.   HENT: Positive for trouble swallowing. Negative for mouth sores and nosebleeds.    Eyes: Negative for discharge and redness.   Respiratory: Positive for cough and shortness of breath. Negative for apnea.    Cardiovascular: Negative for chest pain, palpitations and leg swelling.   Gastrointestinal: Negative for abdominal distention, abdominal pain, anal bleeding, blood in stool, constipation, diarrhea, nausea and vomiting.   Endocrine: Negative for cold intolerance, heat intolerance and polydipsia.   Genitourinary: Negative for dysuria, hematuria and urgency.   Musculoskeletal: Positive for back pain. Negative for arthralgias, joint swelling and myalgias.   Skin: Negative for rash.   Allergic/Immunologic: Positive for food allergies. Negative for immunocompromised state.    Neurological: Negative for dizziness, seizures, syncope and headaches.   Hematological: Negative for adenopathy. Bruises/bleeds easily.   Psychiatric/Behavioral: Negative for dysphoric mood. The patient is not nervous/anxious and is not hyperactive.      Patient Active Problem List   Diagnosis   • Anxiety   • Atherosclerotic heart disease of native coronary artery without angina pectoris   • Atrial fibrillation   • Chronic bronchitis   • Chronic kidney disease, stage III (moderate)   • Steroid-dependent COPD   • Degeneration of cervical intervertebral disc   • Diabetes mellitus   • GERD without esophagitis   • Diverticulosis   • Hemorrhoids   • Hiatal hernia   • Hyperlipidemia   • Hypertension   • Kyphosis, acquired   • Mitral and aortic valve disease   • Phimosis   • Sleep apnea   • Enlarged prostate without lower urinary tract symptoms (luts)   • History of arthritis   • Back pain   • Depression   • Stroke syndrome   • History of ventricular septal defect   • Neck pain   • Impaired mobility and ADLs   • Physical deconditioning     Past Medical History:   Diagnosis Date   • Abnormal liver enzymes    • Acute bronchitis    • Anemia    • Anxiety    • Arthritis    • Atherosclerotic heart disease of native coronary artery without angina pectoris    • Atrial fibrillation    • Atypical chest pain    • Back pain    • BPH (benign prostatic hyperplasia)    • Cardiac pain    • Chronic bronchitis    • Chronic kidney disease    • COPD (chronic obstructive pulmonary disease)    • Degeneration of cervical intervertebral disc    • Depression    • Diabetes mellitus    • Diverticulosis    • Dyspnea    • Esophageal reflux    • Esophagitis    • Gastritis    • Hematuria    • Hemorrhoids    • Hiatal hernia    • History of arthritis    • History of myocardial infarction    • History of peripheral neuropathy    • History of ventricular septal defect    • History of ventricular septal defect    • Hyperlipidemia    • Hypertension    •  Infectious diarrhea    • Infectious diarrhea    • Kyphosis, acquired    • Lumbar radiculopathy    • Mitral and aortic valve disease    • Myocardial infarction    • Nephrolithiasis    • Phimosis    • Respiratory failure    • Sleep apnea    • Stroke syndrome    • Stroke syndrome    • Urinary calculus    • Urinary tract infection    • Ventral hernia      Past Surgical History:   Procedure Laterality Date   • HERNIA REPAIR     • SUPRAPUBIC CATHETER INSERTION      History of Percutaneous Catheter Placement Into Ureter   • THROAT SURGERY     • VSD REPAIR      History of VSD Repair By Patch Closure     Family History   Problem Relation Age of Onset   • Other Father      CABG   • Coronary artery disease Father      Social History   Substance Use Topics   • Smoking status: Former Smoker     Quit date: 2017   • Smokeless tobacco: Never Used   • Alcohol use No       Current Outpatient Prescriptions:   •  alfuzosin (UROXATRAL) 10 MG 24 hr tablet, Take 1 tablet by mouth Daily., Disp: 30 tablet, Rfl: 5  •  ALPRAZolam (XANAX) 0.5 MG tablet, Take 1 tablet by mouth At Night As Needed for Anxiety., Disp: 3 tablet, Rfl: 0  •  amLODIPine (NORVASC) 5 MG tablet, TAKE ONE TABLET BY MOUTH DAILY, Disp: 30 tablet, Rfl: 5  •  atorvastatin (LIPITOR) 40 MG tablet, Take 1 tablet by mouth Daily., Disp: 30 tablet, Rfl: 3  •  baclofen (LIORESAL) 10 MG tablet, Take 1 tablet by mouth 3 (Three) Times a Day., Disp: 45 tablet, Rfl: 0  •  benzonatate (TESSALON) 200 MG capsule, Take 1 capsule by mouth 3 (Three) Times a Day As Needed for Cough., Disp: , Rfl:   •  BREO ELLIPTA 200-25 MCG/INH aerosol powder , Inhale 1 puff Daily., Disp: , Rfl: 10  •  budesonide (PULMICORT) 0.5 MG/2ML nebulizer solution, Take 2 mL by nebulization 2 (Two) Times a Day., Disp: , Rfl:   •  carvedilol (COREG) 3.125 MG tablet, TAKE ONE TABLET BY MOUTH TWICE A DAY WITH MEALS (Patient taking differently: TAKE ONE TABLET BY MOUTH TWICE A DAY WITH MEALS      6.25 bid), Disp: 60 tablet,  Rfl: 2  •  clopidogrel (PLAVIX) 75 MG tablet, Take 1 tablet by mouth Daily., Disp: 90 tablet, Rfl: 1  •  cyclobenzaprine (FLEXERIL) 10 MG tablet, Take 0.5 tablets by mouth At Night As Needed for Muscle Spasms., Disp: 30 tablet, Rfl: 0  •  escitalopram (LEXAPRO) 10 MG tablet, Take 1 tablet by mouth Daily. (Patient taking differently: Take 10 mg by mouth Every Evening.), Disp: 90 tablet, Rfl: 1  •  finasteride (PROSCAR) 5 MG tablet, Take 5 mg by mouth Daily., Disp: , Rfl:   •  fluticasone (FLONASE) 50 MCG/ACT nasal spray, 1 spray Daily., Disp: , Rfl:   •  HYDROcod Polst-CPM Polst ER (TUSSIONEX PENNKINETIC) 10-8 MG/5ML ER suspension, Take 5 mL by mouth Every 12 (Twelve) Hours As Needed for Cough., Disp: 30 mL, Rfl: 0  •  insulin aspart (novoLOG) 100 UNIT/ML injection, Inject 0-7 Units under the skin 4 (Four) Times a Day Before Meals & at Bedtime., Disp: , Rfl: 12  •  ipratropium-albuterol (DUO-NEB) 0.5-2.5 mg/mL nebulizer, Take 3 mL by nebulization Every 6 (Six) Hours., Disp: 360 mL, Rfl:   •  losartan (COZAAR) 100 MG tablet, Take 1 tablet by mouth daily., Disp: 90 tablet, Rfl: 3  •  Multiple Vitamin (MULTI-VITAMIN DAILY PO), Take  by mouth Daily., Disp: , Rfl:   •  nitroglycerin (NITROSTAT) 0.4 MG SL tablet, Place  under the tongue., Disp: , Rfl:   •  oxybutynin (DITROPAN) 5 MG tablet, TAKE ONE TABLET BY MOUTH AT BEDTIME, Disp: 30 tablet, Rfl: 5  •  predniSONE (DELTASONE) 10 MG tablet, 4 tabs daily X 3 days then 2 tabs daily X 3 days then 1 tab daily X 3 days then stop., Disp: , Rfl:   •  PROAIR  (90 BASE) MCG/ACT inhaler, Inhale 2 puffs Every 4 (Four) Hours As Needed for Wheezing or Shortness of Air., Disp: , Rfl:   •  pseudoephedrine-guaifenesin (MUCINEX D)  MG per 12 hr tablet, Take 1 tablet by mouth Every 12 (Twelve) Hours., Disp: 10 tablet, Rfl: 0  •  spironolactone (ALDACTONE) 12.5 MG tablet, Take 2 quarter tablet by mouth Daily., Disp: , Rfl:   •  traMADol (ULTRAM) 50 MG tablet, Take 1 tablet by  "mouth Every 6 (Six) Hours As Needed for Moderate Pain ., Disp: 10 tablet, Rfl: 0  •  pantoprazole (PROTONIX) 20 MG EC tablet, Take 1 tablet by mouth Every Morning Before Breakfast., Disp: 30 tablet, Rfl: 6    Allergies   Allergen Reactions   • Milk-Related Compounds    • Sulfa Antibiotics Other (See Comments), Nausea Only and Nausea And Vomiting       Blood pressure 140/78, pulse 105, temperature 97.6 °F (36.4 °C), resp. rate 18, height 63\" (160 cm), weight 143 lb (64.9 kg).    Physical Exam   Constitutional: He is oriented to person, place, and time. He appears well-developed and well-nourished. No distress.   HENT:   Head: Normocephalic and atraumatic.   Right Ear: Hearing and external ear normal.   Left Ear: Hearing and external ear normal.   Nose: Nose normal.   Mouth/Throat: Oropharynx is clear and moist and mucous membranes are normal. Mucous membranes are not pale, not dry and not cyanotic. No oral lesions. No oropharyngeal exudate.   Eyes: Conjunctivae and EOM are normal. Right eye exhibits no discharge. Left eye exhibits no discharge. No scleral icterus.   Neck: Trachea normal. Neck supple. No JVD present. No edema present. No thyroid mass and no thyromegaly present.   Cardiovascular: Normal rate, regular rhythm, S2 normal and normal heart sounds.  Exam reveals no gallop, no S3 and no friction rub.    No murmur heard.  Pulmonary/Chest: Effort normal and breath sounds normal. No respiratory distress. He has no wheezes. He has no rales. He exhibits no tenderness.   Abdominal: Soft. Normal appearance and bowel sounds are normal. He exhibits no distension, no ascites and no mass. There is no splenomegaly or hepatomegaly. There is no tenderness. There is no rigidity, no rebound and no guarding. No hernia.   Musculoskeletal: He exhibits no tenderness or deformity.       Vascular Status -  His exam exhibits no right foot edema. His exam exhibits no left foot edema.  Lymphadenopathy:     He has no cervical " adenopathy.        Left: No supraclavicular adenopathy present.   Neurological: He is alert and oriented to person, place, and time. He has normal strength. No cranial nerve deficit or sensory deficit. He exhibits normal muscle tone. Coordination normal.   Skin: No rash noted. He is not diaphoretic. No cyanosis. No pallor. Nails show no clubbing.   Psychiatric: He has a normal mood and affect. His behavior is normal. Judgment and thought content normal.   Nursing note and vitals reviewed.    Stigmata of chronic liver disease:  None.  Asterixis:  None.      Laboratory Testing:  Upon review of medical records:    Dated September 8, 2017 sodium 145 potassium 4.4 chloride 96 CO2 42 BUN 26 serum creatinine 1.10 glucose 127.  Calcium 10.5.  Albumin 3.90.  T bili 1.3 AST 31 ALT 37 alkaline phosphatase 90.  Total protein 6.6.  WBC 15.84 hemoglobin 12.2 hematocrit 39.8 MCV 95.9 and platelet count 175.    Dated September 16, 2017 sodium 138 potassium 4.5 chloride 99 CO2 32 BUN 43 serum creatinine 1.00 glucose 110.  Calcium 9.0.  WBC 16.73 hemoglobin 11.1 hematocrit 34.0 MCV 89.7 platelet count 229.    Abdominal Imaging:  Upon review of medical records:      Dated October 24, 2017 the patient underwent a barium swallow which revealed:  Severe dysmotility. No hiatal hernia. Gastroesophageal reflux to the thoracic inlet. A 13 mm barium tablet is delayed in the distal esophagus for the remainder of the exam. The rugal fold pattern of the stomach is normal. The duodenal bulb is normal.           Procedure:  Upon review of medical records:     Dated January 3, 2013 the patient underwent a colonoscopy in [facility] by Dr. Posadas which revealed: Left-sided colonic diverticulosis.  Internal and external hemorrhoids.  Otherwise normal colonoscopy procedure.    Dated June 24, 2013 the patient underwent an upper endoscopy which revealed:  Erythematous/erosive gastritis with evidence of healed ulceration, erythematous distal  esophagitis, a small sliding hiatal hernia less than 3 cm, polypoid area at the duodenal bulb.  Prominent redundant folds were noted within the stomach toward the greater curvature.  These were flipped using the biopsy forceps.  Erosions were noted.  No Brown mucosa was seen.  Tortuosity and tertiary contractions of the esophageal body was noted.  No strictures or Schatzki’s ring was seen.  The likely cause of intermittent dysphagia is esophageal dysmotility.  Duodenal bulb, polypoid area, biopsy revealed active duodenitis.  Second portion of duodenum, biopsy revealed no pathologic alterations.  Healed ulcer edge, biopsies revealed mild chronic gastritis, negative for Helicobacter species.  Gastric body, prominent folds, biopsies revealed mild chronic gastritis, negative for Helicobacter species.      Assessment and Plan:      Jani was seen today for follow-up.    Diagnoses and all orders for this visit:    Heartburn  -     pantoprazole (PROTONIX) 20 MG EC tablet; Take 1 tablet by mouth Every Morning Before Breakfast.    Other dysphagia  -     pantoprazole (PROTONIX) 20 MG EC tablet; Take 1 tablet by mouth Every Morning Before Breakfast.        Plan  and Patient Instructions:    Patient Instructions   1. Anti-reflex measures.  2. Dietary instructions.  The patient should eat relatively soft diet.  Meat is best consumed as ground, vegetables as steamed and mashed and fruit as blenderized.  Bread, take and cookies may be consumed after depending in milk, coffee or tea.  He should eat in upright position and chew well.  The patient should drink water after to 3 bites and take medications with liberal amounts of water.  Generally medications that have a potential to cause pill esophagitis may be avoided or used in an alternative form (for example if the patient needs potassium it may be used in a liquid rather than pill form).  Similarly aspirin can be switched to chewable aspirin.  Furthermore after eating, and  taking medications the patient should remain in upright position for 5-10 minutes.  3. Options were discussed with the patient in detail.  He will observe dietary precautions.  4. May discontinue famotidine.  5. Pantoprazole 40 mg 1 by mouth every morning one half hour before breakfast.  The patient may take Plavix at night.  6. Follow-up:  The patient may call back regarding progress in 1-2 weeks.  Follow-up otherwise in 6 weeks or so.        Gregory Worthy MD

## 2017-11-01 ENCOUNTER — TELEPHONE (OUTPATIENT)
Dept: INTERNAL MEDICINE | Facility: CLINIC | Age: 67
End: 2017-11-01

## 2017-11-01 DIAGNOSIS — J06.9 UPPER RESPIRATORY TRACT INFECTION, UNSPECIFIED TYPE: Primary | ICD-10-CM

## 2017-11-01 RX ORDER — AZITHROMYCIN 500 MG/1
500 TABLET, FILM COATED ORAL DAILY
Qty: 10 TABLET | Refills: 0 | Status: SHIPPED | OUTPATIENT
Start: 2017-11-01 | End: 2017-11-14

## 2017-11-01 RX ORDER — DEXTROMETHORPHAN HYDROBROMIDE AND PROMETHAZINE HYDROCHLORIDE 15; 6.25 MG/5ML; MG/5ML
5 SYRUP ORAL 4 TIMES DAILY PRN
Qty: 240 ML | Refills: 0 | Status: SHIPPED | OUTPATIENT
Start: 2017-11-01 | End: 2017-11-14

## 2017-11-01 RX ORDER — SPIRONOLACTONE 25 MG/1
12.5 TABLET ORAL DAILY
Qty: 15 TABLET | Refills: 3 | Status: SHIPPED | OUTPATIENT
Start: 2017-11-01 | End: 2018-01-11 | Stop reason: HOSPADM

## 2017-11-01 NOTE — TELEPHONE ENCOUNTER
PATIENT REQUESTING REFILL OF SPIRONOLACTONE. HE IS COMPLETELY OUT. HE ADVISED THAT HE HAS CALLED NUMEROUS TIMES AND HAS HAD NO RESPONSE.

## 2017-11-01 NOTE — TELEPHONE ENCOUNTER
PATIENT HAS DEVELOPED A COUGH SINCE LAST NIGHT AND HAS BEEN CONGESTED. HE STATES HE IS COUGHING UP THICK WHITE MUCUS.     CAN HE HAVE SOMETHING CALLED IN TO THE PHARMACY?     HE IS AWARE DR. CHOUDHARY IS OUT OF THE OFFICE.     PATIENT WOULD LIKE A RETURN CALL.     HE IS ALLERGIC TO SULFA DRUGS.

## 2017-11-13 LAB
ALBUMIN SERPL-MCNC: 3.7 G/DL (ref 3.5–5)
ALBUMIN/GLOB SERPL: 1.7 G/DL (ref 1–2)
ALP SERPL-CCNC: 65 U/L (ref 38–126)
ALT SERPL-CCNC: 26 U/L (ref 13–69)
AST SERPL-CCNC: 25 U/L (ref 15–46)
BASOPHILS # BLD AUTO: 0.03 10*3/MM3 (ref 0–0.2)
BASOPHILS NFR BLD AUTO: 0.3 % (ref 0–2.5)
BILIRUB SERPL-MCNC: 0.4 MG/DL (ref 0.2–1.3)
BUN SERPL-MCNC: 18 MG/DL (ref 7–20)
BUN/CREAT SERPL: 16.4 (ref 6.3–21.9)
CALCIUM SERPL-MCNC: 9.9 MG/DL (ref 8.4–10.2)
CHLORIDE SERPL-SCNC: 102 MMOL/L (ref 98–107)
CHOLEST SERPL-MCNC: 134 MG/DL (ref 0–199)
CK SERPL-CCNC: 45 U/L (ref 30–170)
CO2 SERPL-SCNC: 33 MMOL/L (ref 26–30)
CREAT SERPL-MCNC: 1.1 MG/DL (ref 0.6–1.3)
EOSINOPHIL # BLD AUTO: 0.19 10*3/MM3 (ref 0–0.7)
EOSINOPHIL NFR BLD AUTO: 2.1 % (ref 0–7)
ERYTHROCYTE [DISTWIDTH] IN BLOOD BY AUTOMATED COUNT: 13.3 % (ref 11.5–14.5)
GFR SERPLBLD CREATININE-BSD FMLA CKD-EPI: 67 ML/MIN/1.73
GFR SERPLBLD CREATININE-BSD FMLA CKD-EPI: 81 ML/MIN/1.73
GLOBULIN SER CALC-MCNC: 2.2 GM/DL
GLUCOSE SERPL-MCNC: 98 MG/DL (ref 74–98)
HBA1C MFR BLD: 5.6 %
HCT VFR BLD AUTO: 35.8 % (ref 42–52)
HDLC SERPL-MCNC: 56 MG/DL (ref 40–60)
HGB BLD-MCNC: 11 G/DL (ref 14–18)
IMM GRANULOCYTES # BLD: 0.03 10*3/MM3 (ref 0–0.06)
IMM GRANULOCYTES NFR BLD: 0.3 % (ref 0–0.6)
LDLC SERPL CALC-MCNC: 62 MG/DL (ref 0–99)
LYMPHOCYTES # BLD AUTO: 1.69 10*3/MM3 (ref 0.6–3.4)
LYMPHOCYTES NFR BLD AUTO: 19 % (ref 10–50)
MCH RBC QN AUTO: 28.9 PG (ref 27–31)
MCHC RBC AUTO-ENTMCNC: 30.7 G/DL (ref 30–37)
MCV RBC AUTO: 94 FL (ref 80–94)
MONOCYTES # BLD AUTO: 0.81 10*3/MM3 (ref 0–0.9)
MONOCYTES NFR BLD AUTO: 9.1 % (ref 0–12)
NEUTROPHILS # BLD AUTO: 6.16 10*3/MM3 (ref 2–6.9)
NEUTROPHILS NFR BLD AUTO: 69.2 % (ref 37–80)
NRBC BLD AUTO-RTO: 0 /100 WBC (ref 0–0)
PLATELET # BLD AUTO: 215 10*3/MM3 (ref 130–400)
POTASSIUM SERPL-SCNC: 3.5 MMOL/L (ref 3.5–5.1)
PROT SERPL-MCNC: 5.9 G/DL (ref 6.3–8.2)
RBC # BLD AUTO: 3.81 10*6/MM3 (ref 4.7–6.1)
SODIUM SERPL-SCNC: 146 MMOL/L (ref 137–145)
TRIGL SERPL-MCNC: 80 MG/DL
TSH SERPL DL<=0.005 MIU/L-ACNC: 0.9 MIU/ML (ref 0.47–4.68)
VLDLC SERPL CALC-MCNC: 16 MG/DL
WBC # BLD AUTO: 8.91 10*3/MM3 (ref 4.8–10.8)

## 2017-11-14 ENCOUNTER — OFFICE VISIT (OUTPATIENT)
Dept: INTERNAL MEDICINE | Facility: CLINIC | Age: 67
End: 2017-11-14

## 2017-11-14 VITALS
SYSTOLIC BLOOD PRESSURE: 130 MMHG | HEIGHT: 63 IN | BODY MASS INDEX: 26.58 KG/M2 | WEIGHT: 150 LBS | DIASTOLIC BLOOD PRESSURE: 70 MMHG | HEART RATE: 86 BPM | OXYGEN SATURATION: 97 % | RESPIRATION RATE: 14 BRPM | TEMPERATURE: 98.9 F

## 2017-11-14 DIAGNOSIS — I25.10 ATHEROSCLEROSIS OF NATIVE CORONARY ARTERY OF NATIVE HEART WITHOUT ANGINA PECTORIS: Primary | ICD-10-CM

## 2017-11-14 DIAGNOSIS — E78.5 HYPERLIPIDEMIA, UNSPECIFIED HYPERLIPIDEMIA TYPE: ICD-10-CM

## 2017-11-14 DIAGNOSIS — I48.91 ATRIAL FIBRILLATION, UNSPECIFIED TYPE (HCC): ICD-10-CM

## 2017-11-14 DIAGNOSIS — F32.A DEPRESSION, UNSPECIFIED DEPRESSION TYPE: ICD-10-CM

## 2017-11-14 DIAGNOSIS — J44.9 STEROID-DEPENDENT COPD (HCC): ICD-10-CM

## 2017-11-14 DIAGNOSIS — I10 ESSENTIAL HYPERTENSION: ICD-10-CM

## 2017-11-14 DIAGNOSIS — F41.9 ANXIETY: ICD-10-CM

## 2017-11-14 DIAGNOSIS — E11.8 TYPE 2 DIABETES MELLITUS WITH COMPLICATION, WITHOUT LONG-TERM CURRENT USE OF INSULIN (HCC): ICD-10-CM

## 2017-11-14 DIAGNOSIS — N18.30 CHRONIC KIDNEY DISEASE, STAGE III (MODERATE) (HCC): ICD-10-CM

## 2017-11-14 PROCEDURE — 99214 OFFICE O/P EST MOD 30 MIN: CPT | Performed by: INTERNAL MEDICINE

## 2017-11-14 PROCEDURE — 90662 IIV NO PRSV INCREASED AG IM: CPT | Performed by: INTERNAL MEDICINE

## 2017-11-14 PROCEDURE — G0008 ADMIN INFLUENZA VIRUS VAC: HCPCS | Performed by: INTERNAL MEDICINE

## 2017-11-14 NOTE — PROGRESS NOTES
Subjective   Thurman Mendel Parsons is a 67 y.o. male.     Chief Complaint   Patient presents with   • Follow-up   • Labs Only       History of Present Illness   Patient here for follow-up.  COPD endstage stable now denies any significant worsening short of breath issues on oxygen.  Oxygen saturation today 97%.  Swallow problem barium swallow showed distant motility.  Patient is seeing gastroenterologist.  Diabetes stable without medication.  Hypertension stable on medication.  Hemoglobin again low probable anemia of chronic disease.  Bilateral lower extremity edema mild.    Current Outpatient Prescriptions:   •  alfuzosin (UROXATRAL) 10 MG 24 hr tablet, Take 1 tablet by mouth Daily., Disp: 30 tablet, Rfl: 5  •  ALPRAZolam (XANAX) 0.5 MG tablet, Take 1 tablet by mouth At Night As Needed for Anxiety., Disp: 3 tablet, Rfl: 0  •  amLODIPine (NORVASC) 5 MG tablet, TAKE ONE TABLET BY MOUTH DAILY, Disp: 30 tablet, Rfl: 5  •  atorvastatin (LIPITOR) 40 MG tablet, Take 1 tablet by mouth Daily., Disp: 30 tablet, Rfl: 3  •  baclofen (LIORESAL) 10 MG tablet, Take 1 tablet by mouth 3 (Three) Times a Day., Disp: 45 tablet, Rfl: 0  •  benzonatate (TESSALON) 200 MG capsule, Take 1 capsule by mouth 3 (Three) Times a Day As Needed for Cough., Disp: , Rfl:   •  BREO ELLIPTA 200-25 MCG/INH aerosol powder , Inhale 1 puff Daily., Disp: , Rfl: 10  •  budesonide (PULMICORT) 0.5 MG/2ML nebulizer solution, Take 2 mL by nebulization 2 (Two) Times a Day., Disp: , Rfl:   •  carvedilol (COREG) 3.125 MG tablet, TAKE ONE TABLET BY MOUTH TWICE A DAY WITH MEALS (Patient taking differently: TAKE ONE TABLET BY MOUTH TWICE A DAY WITH MEALS      6.25 bid), Disp: 60 tablet, Rfl: 2  •  clopidogrel (PLAVIX) 75 MG tablet, Take 1 tablet by mouth Daily., Disp: 90 tablet, Rfl: 1  •  cyclobenzaprine (FLEXERIL) 10 MG tablet, Take 0.5 tablets by mouth At Night As Needed for Muscle Spasms., Disp: 30 tablet, Rfl: 0  •  escitalopram (LEXAPRO) 10 MG tablet, Take 1  tablet by mouth Daily. (Patient taking differently: Take 10 mg by mouth Every Evening.), Disp: 90 tablet, Rfl: 1  •  finasteride (PROSCAR) 5 MG tablet, Take 5 mg by mouth Daily., Disp: , Rfl:   •  fluticasone (FLONASE) 50 MCG/ACT nasal spray, 1 spray Daily., Disp: , Rfl:   •  HYDROcod Polst-CPM Polst ER (TUSSIONEX PENNKINETIC) 10-8 MG/5ML ER suspension, Take 5 mL by mouth Every 12 (Twelve) Hours As Needed for Cough., Disp: 30 mL, Rfl: 0  •  insulin aspart (novoLOG) 100 UNIT/ML injection, Inject 0-7 Units under the skin 4 (Four) Times a Day Before Meals & at Bedtime., Disp: , Rfl: 12  •  ipratropium-albuterol (DUO-NEB) 0.5-2.5 mg/mL nebulizer, Take 3 mL by nebulization Every 6 (Six) Hours., Disp: 360 mL, Rfl:   •  losartan (COZAAR) 100 MG tablet, Take 1 tablet by mouth daily., Disp: 90 tablet, Rfl: 3  •  Multiple Vitamin (MULTI-VITAMIN DAILY PO), Take  by mouth Daily., Disp: , Rfl:   •  nitroglycerin (NITROSTAT) 0.4 MG SL tablet, Place  under the tongue., Disp: , Rfl:   •  oxybutynin (DITROPAN) 5 MG tablet, TAKE ONE TABLET BY MOUTH AT BEDTIME, Disp: 30 tablet, Rfl: 5  •  pantoprazole (PROTONIX) 20 MG EC tablet, Take 1 tablet by mouth Every Morning Before Breakfast., Disp: 30 tablet, Rfl: 6  •  predniSONE (DELTASONE) 10 MG tablet, 4 tabs daily X 3 days then 2 tabs daily X 3 days then 1 tab daily X 3 days then stop., Disp: , Rfl:   •  PROAIR  (90 BASE) MCG/ACT inhaler, Inhale 2 puffs Every 4 (Four) Hours As Needed for Wheezing or Shortness of Air., Disp: , Rfl:   •  pseudoephedrine-guaifenesin (MUCINEX D)  MG per 12 hr tablet, Take 1 tablet by mouth Every 12 (Twelve) Hours., Disp: 10 tablet, Rfl: 0  •  spironolactone (ALDACTONE) 25 MG tablet, Take 0.5 tablets by mouth Daily., Disp: 15 tablet, Rfl: 3  •  traMADol (ULTRAM) 50 MG tablet, Take 1 tablet by mouth Every 6 (Six) Hours As Needed for Moderate Pain ., Disp: 10 tablet, Rfl: 0    The following portions of the patient's history were reviewed and updated  as appropriate: allergies, current medications, past family history, past medical history, past social history, past surgical history and problem list.    Review of Systems   Constitutional: Negative.    Respiratory: Positive for cough and shortness of breath.    Cardiovascular: Negative.    Gastrointestinal: Negative.    Musculoskeletal: Negative.    Skin: Negative.    Neurological: Negative.    Psychiatric/Behavioral: Negative.        Objective   Physical Exam   Constitutional: He is oriented to person, place, and time. He appears well-nourished.   Neck: Neck supple.   Cardiovascular: Normal rate, regular rhythm and normal heart sounds.    Pulmonary/Chest: Effort normal. He has wheezes.   Abdominal: Bowel sounds are normal.   Neurological: He is alert and oriented to person, place, and time.   Skin: Skin is warm.   Psychiatric: He has a normal mood and affect.       All tests have been reviewed.    Assessment/Plan   There are no diagnoses linked to this encounter.           COPD continue inhaler chronic prednisone 10mg. Follow up with lung doctor this PM, continue meds and O2  Dysphagia follow GI  Left neck pain start baclofen   osteopenia, oscal D 500mg bid continue  Weight loss  watch  left low back pain follow up with ortho s/p left hip surgery for severe OA doing well.   alkaline phosphatase still high watch---  divertic hemorroid on colon 1/2013  DM stable without med  phimosis seen by uro declined surgery  Hematuria followup with urologist s/p cystoscope bladder stones  Hypertension continue meds and diet  BPH enlarged on CT scan follow up with urologist.continue flomax   Anemia workup all negative.? anemia of chronic disease due to chronic disease   CKD follow up with kidney doctor  History of the CAD atrial fibrillation VSD status post occlusion stable now. Status post a defibrillator seen by cardio follow up with cardio  History of a TIA patient is on Plavix continue  pneumovax done, zostavax to pharmacy,  prevnar 13 1/19/17, Td to HD.flushot today  Depression cont lexapro 20  Decline to disrobe for PE  right inquinal hernia decline surgery now, call if worse  BLE edema mild, watch for now, cut down salt  Neck pain chiro for now  2 mo wellnexx

## 2017-11-15 ENCOUNTER — TELEPHONE (OUTPATIENT)
Dept: GASTROENTEROLOGY | Facility: CLINIC | Age: 67
End: 2017-11-15

## 2017-11-15 NOTE — TELEPHONE ENCOUNTER
Calling to check up after last visit. Patient states he is swallowing much better and is following the plan Dr. Worthy laid out.

## 2017-11-20 DIAGNOSIS — J06.9 UPPER RESPIRATORY TRACT INFECTION, UNSPECIFIED TYPE: ICD-10-CM

## 2017-11-20 RX ORDER — AZITHROMYCIN 500 MG/1
TABLET, FILM COATED ORAL
Qty: 10 TABLET | Refills: 0 | OUTPATIENT
Start: 2017-11-20

## 2017-12-13 RX ORDER — CYCLOBENZAPRINE HCL 10 MG
5 TABLET ORAL NIGHTLY PRN
Qty: 30 TABLET | Refills: 0
Start: 2017-12-13 | End: 2017-12-22 | Stop reason: SDUPTHER

## 2017-12-13 RX ORDER — FINASTERIDE 5 MG/1
5 TABLET, FILM COATED ORAL DAILY
Qty: 90 TABLET | Refills: 3 | Status: SHIPPED | OUTPATIENT
Start: 2017-12-13 | End: 2018-03-19 | Stop reason: ALTCHOICE

## 2017-12-18 NOTE — TELEPHONE ENCOUNTER
[facility] pharmacy notified that patient needs refills and to please send a refills request to Dr. Rojas.

## 2017-12-22 RX ORDER — GUAIFENESIN, PSEUDOEPHEDRINE HYDROCHLORIDE 600; 60 MG/1; MG/1
1 TABLET, EXTENDED RELEASE ORAL EVERY 12 HOURS SCHEDULED
Qty: 10 TABLET | Refills: 0 | Status: SHIPPED | OUTPATIENT
Start: 2017-12-22 | End: 2018-01-11 | Stop reason: HOSPADM

## 2017-12-22 RX ORDER — FLUTICASONE PROPIONATE 50 MCG
1 SPRAY, SUSPENSION (ML) NASAL DAILY
Qty: 16 G | Refills: 5 | Status: SHIPPED | OUTPATIENT
Start: 2017-12-22 | End: 2019-08-31 | Stop reason: HOSPADM

## 2017-12-22 RX ORDER — CYCLOBENZAPRINE HCL 10 MG
5 TABLET ORAL NIGHTLY PRN
Qty: 30 TABLET | Refills: 0 | Status: SHIPPED | OUTPATIENT
Start: 2017-12-22 | End: 2018-01-11 | Stop reason: HOSPADM

## 2017-12-22 NOTE — TELEPHONE ENCOUNTER
Medication refill request.    Patients medications have been sent to [facility]

## 2017-12-24 ENCOUNTER — HOSPITAL ENCOUNTER (INPATIENT)
Facility: HOSPITAL | Age: 67
LOS: 18 days | Discharge: NURSING, REHAB OR LONG-TERM CARE | End: 2018-01-11
Attending: STUDENT IN AN ORGANIZED HEALTH CARE EDUCATION/TRAINING PROGRAM | Admitting: INTERNAL MEDICINE

## 2017-12-24 ENCOUNTER — APPOINTMENT (OUTPATIENT)
Dept: GENERAL RADIOLOGY | Facility: HOSPITAL | Age: 67
End: 2017-12-24

## 2017-12-24 DIAGNOSIS — Z78.9 IMPAIRED MOBILITY AND ADLS: ICD-10-CM

## 2017-12-24 DIAGNOSIS — J96.22 ACUTE ON CHRONIC RESPIRATORY FAILURE WITH HYPERCAPNIA (HCC): ICD-10-CM

## 2017-12-24 DIAGNOSIS — Z74.09 IMPAIRED MOBILITY AND ADLS: ICD-10-CM

## 2017-12-24 DIAGNOSIS — Z74.09 IMPAIRED FUNCTIONAL MOBILITY, BALANCE, GAIT, AND ENDURANCE: ICD-10-CM

## 2017-12-24 DIAGNOSIS — J44.1 ACUTE EXACERBATION OF CHRONIC OBSTRUCTIVE PULMONARY DISEASE (COPD) (HCC): Primary | ICD-10-CM

## 2017-12-24 LAB
ALBUMIN SERPL-MCNC: 3.8 G/DL (ref 3.5–5)
ALBUMIN/GLOB SERPL: 1.4 G/DL (ref 1–2)
ALP SERPL-CCNC: 81 U/L (ref 38–126)
ALT SERPL W P-5'-P-CCNC: 31 U/L (ref 13–69)
ANION GAP SERPL CALCULATED.3IONS-SCNC: 8.8 MMOL/L
ARTERIAL PATENCY WRIST A: POSITIVE
ARTERIAL PATENCY WRIST A: POSITIVE
AST SERPL-CCNC: 28 U/L (ref 15–46)
BASE EXCESS BLDA CALC-SCNC: 6.7 MMOL/L
BASE EXCESS BLDA CALC-SCNC: 8.1 MMOL/L
BASOPHILS # BLD AUTO: 0.06 10*3/MM3 (ref 0–0.2)
BASOPHILS NFR BLD AUTO: 0.7 % (ref 0–2.5)
BDY SITE: ABNORMAL
BDY SITE: ABNORMAL
BILIRUB SERPL-MCNC: 0.6 MG/DL (ref 0.2–1.3)
BUN BLD-MCNC: 22 MG/DL (ref 7–20)
BUN/CREAT SERPL: 18.3 (ref 6.3–21.9)
CALCIUM SPEC-SCNC: 9.7 MG/DL (ref 8.4–10.2)
CHLORIDE SERPL-SCNC: 102 MMOL/L (ref 98–107)
CO2 SERPL-SCNC: 37 MMOL/L (ref 26–30)
COHGB MFR BLD: 2.4 %
COHGB MFR BLD: 2.5 %
CREAT BLD-MCNC: 1.2 MG/DL (ref 0.6–1.3)
DEPRECATED RDW RBC AUTO: 45.1 FL (ref 37–54)
EOSINOPHIL # BLD AUTO: 0.19 10*3/MM3 (ref 0–0.7)
EOSINOPHIL NFR BLD AUTO: 2.2 % (ref 0–7)
ERYTHROCYTE [DISTWIDTH] IN BLOOD BY AUTOMATED COUNT: 13 % (ref 11.5–14.5)
GFR SERPL CREATININE-BSD FRML MDRD: 60 ML/MIN/1.73
GLOBULIN UR ELPH-MCNC: 2.7 GM/DL
GLUCOSE BLD-MCNC: 102 MG/DL (ref 74–98)
HCO3 BLDA-SCNC: 33.9 MMOL/L (ref 22–28)
HCO3 BLDA-SCNC: 34.2 MMOL/L (ref 22–28)
HCT VFR BLD AUTO: 40.7 % (ref 42–52)
HGB BLD-MCNC: 12.4 G/DL (ref 14–18)
HGB BLDA-MCNC: 12 G/DL (ref 12–18)
HGB BLDA-MCNC: 13.3 G/DL (ref 12–18)
HOLD SPECIMEN: NORMAL
HOLD SPECIMEN: NORMAL
HOROWITZ INDEX BLD+IHG-RTO: 45 %
HOROWITZ INDEX BLD+IHG-RTO: 80 %
IMM GRANULOCYTES # BLD: 0.02 10*3/MM3 (ref 0–0.06)
IMM GRANULOCYTES NFR BLD: 0.2 % (ref 0–0.6)
LYMPHOCYTES # BLD AUTO: 0.77 10*3/MM3 (ref 0.6–3.4)
LYMPHOCYTES NFR BLD AUTO: 8.8 % (ref 10–50)
MCH RBC QN AUTO: 29 PG (ref 27–31)
MCHC RBC AUTO-ENTMCNC: 30.5 G/DL (ref 30–37)
MCV RBC AUTO: 95.1 FL (ref 80–94)
METHGB BLD QL: 0.4 %
METHGB BLD QL: 0.5 %
MODALITY: ABNORMAL
MODALITY: ABNORMAL
MONOCYTES # BLD AUTO: 1.08 10*3/MM3 (ref 0–0.9)
MONOCYTES NFR BLD AUTO: 12.3 % (ref 0–12)
NEUTROPHILS # BLD AUTO: 6.63 10*3/MM3 (ref 2–6.9)
NEUTROPHILS NFR BLD AUTO: 75.8 % (ref 37–80)
NRBC BLD MANUAL-RTO: 0 /100 WBC (ref 0–0)
NT-PROBNP SERPL-MCNC: 1230 PG/ML (ref 0–125)
OXYHGB MFR BLDV: 96 % (ref 94–99)
OXYHGB MFR BLDV: 96.7 % (ref 94–99)
PCO2 BLDA: 63.2 MM HG (ref 35–45)
PCO2 BLDA: 81.4 MM HG (ref 35–45)
PH BLDA: 7.23 PH UNITS (ref 7.3–7.5)
PH BLDA: 7.34 PH UNITS (ref 7.3–7.5)
PLATELET # BLD AUTO: 198 10*3/MM3 (ref 130–400)
PMV BLD AUTO: 8.9 FL (ref 6–12)
PO2 BLDA: 113 MM HG (ref 75–100)
PO2 BLDA: 173 MM HG (ref 75–100)
POTASSIUM BLD-SCNC: 3.8 MMOL/L (ref 3.5–5.1)
PROT SERPL-MCNC: 6.5 G/DL (ref 6.3–8.2)
RBC # BLD AUTO: 4.28 10*6/MM3 (ref 4.7–6.1)
SAO2 % BLDCOA: 98.9 %
SAO2 % BLDCOA: 99.6 %
SODIUM BLD-SCNC: 144 MMOL/L (ref 137–145)
TROPONIN I SERPL-MCNC: 0.08 NG/ML (ref 0–0.03)
WBC NRBC COR # BLD: 8.75 10*3/MM3 (ref 4.8–10.8)
WHOLE BLOOD HOLD SPECIMEN: NORMAL
WHOLE BLOOD HOLD SPECIMEN: NORMAL

## 2017-12-24 PROCEDURE — 94799 UNLISTED PULMONARY SVC/PX: CPT

## 2017-12-24 PROCEDURE — 83880 ASSAY OF NATRIURETIC PEPTIDE: CPT

## 2017-12-24 PROCEDURE — 94640 AIRWAY INHALATION TREATMENT: CPT

## 2017-12-24 PROCEDURE — 82375 ASSAY CARBOXYHB QUANT: CPT

## 2017-12-24 PROCEDURE — 93005 ELECTROCARDIOGRAM TRACING: CPT | Performed by: INTERNAL MEDICINE

## 2017-12-24 PROCEDURE — 25010000002 CEFTRIAXONE IN SWFI 1 GRAM/10ML IV PUSH SYRINGE (SIMPLE): Performed by: STUDENT IN AN ORGANIZED HEALTH CARE EDUCATION/TRAINING PROGRAM

## 2017-12-24 PROCEDURE — 80053 COMPREHEN METABOLIC PANEL: CPT

## 2017-12-24 PROCEDURE — 93005 ELECTROCARDIOGRAM TRACING: CPT

## 2017-12-24 PROCEDURE — 25010000002 MAGNESIUM SULFATE 2 GM/50ML SOLUTION: Performed by: STUDENT IN AN ORGANIZED HEALTH CARE EDUCATION/TRAINING PROGRAM

## 2017-12-24 PROCEDURE — 94660 CPAP INITIATION&MGMT: CPT

## 2017-12-24 PROCEDURE — 36600 WITHDRAWAL OF ARTERIAL BLOOD: CPT

## 2017-12-24 PROCEDURE — 87040 BLOOD CULTURE FOR BACTERIA: CPT | Performed by: INTERNAL MEDICINE

## 2017-12-24 PROCEDURE — 99285 EMERGENCY DEPT VISIT HI MDM: CPT

## 2017-12-24 PROCEDURE — 85025 COMPLETE CBC W/AUTO DIFF WBC: CPT

## 2017-12-24 PROCEDURE — 83050 HGB METHEMOGLOBIN QUAN: CPT

## 2017-12-24 PROCEDURE — 25010000002 ENOXAPARIN PER 10 MG: Performed by: INTERNAL MEDICINE

## 2017-12-24 PROCEDURE — 5A09357 ASSISTANCE WITH RESPIRATORY VENTILATION, LESS THAN 24 CONSECUTIVE HOURS, CONTINUOUS POSITIVE AIRWAY PRESSURE: ICD-10-PCS | Performed by: FAMILY MEDICINE

## 2017-12-24 PROCEDURE — 25010000002 METHYLPREDNISOLONE PER 125 MG: Performed by: STUDENT IN AN ORGANIZED HEALTH CARE EDUCATION/TRAINING PROGRAM

## 2017-12-24 PROCEDURE — 84484 ASSAY OF TROPONIN QUANT: CPT

## 2017-12-24 PROCEDURE — 71010 HC CHEST PA OR AP: CPT

## 2017-12-24 PROCEDURE — 82805 BLOOD GASES W/O2 SATURATION: CPT

## 2017-12-24 RX ORDER — SPIRONOLACTONE 25 MG/1
12.5 TABLET ORAL DAILY
Status: DISCONTINUED | OUTPATIENT
Start: 2017-12-25 | End: 2017-12-28

## 2017-12-24 RX ORDER — ALPRAZOLAM 0.5 MG/1
0.5 TABLET ORAL NIGHTLY PRN
Status: DISCONTINUED | OUTPATIENT
Start: 2017-12-24 | End: 2017-12-26

## 2017-12-24 RX ORDER — TRAMADOL HYDROCHLORIDE 50 MG/1
50 TABLET ORAL EVERY 6 HOURS PRN
Status: DISCONTINUED | OUTPATIENT
Start: 2017-12-24 | End: 2018-01-11 | Stop reason: HOSPADM

## 2017-12-24 RX ORDER — PANTOPRAZOLE SODIUM 20 MG/1
20 TABLET, DELAYED RELEASE ORAL
Status: DISCONTINUED | OUTPATIENT
Start: 2017-12-25 | End: 2017-12-24 | Stop reason: CLARIF

## 2017-12-24 RX ORDER — AZITHROMYCIN 250 MG/1
500 TABLET, FILM COATED ORAL ONCE
Status: COMPLETED | OUTPATIENT
Start: 2017-12-24 | End: 2017-12-24

## 2017-12-24 RX ORDER — NITROGLYCERIN 0.4 MG/1
0.4 TABLET SUBLINGUAL
Status: DISCONTINUED | OUTPATIENT
Start: 2017-12-24 | End: 2018-01-11 | Stop reason: HOSPADM

## 2017-12-24 RX ORDER — ALBUTEROL SULFATE 2.5 MG/3ML
2.5 SOLUTION RESPIRATORY (INHALATION) EVERY 6 HOURS PRN
Status: DISCONTINUED | OUTPATIENT
Start: 2017-12-24 | End: 2018-01-11 | Stop reason: HOSPADM

## 2017-12-24 RX ORDER — IPRATROPIUM BROMIDE AND ALBUTEROL SULFATE 2.5; .5 MG/3ML; MG/3ML
3 SOLUTION RESPIRATORY (INHALATION)
Status: DISCONTINUED | OUTPATIENT
Start: 2017-12-24 | End: 2017-12-26

## 2017-12-24 RX ORDER — FINASTERIDE 5 MG/1
5 TABLET, FILM COATED ORAL DAILY
Status: DISCONTINUED | OUTPATIENT
Start: 2017-12-25 | End: 2018-01-11 | Stop reason: HOSPADM

## 2017-12-24 RX ORDER — METHYLPREDNISOLONE SODIUM SUCCINATE 125 MG/2ML
125 INJECTION, POWDER, LYOPHILIZED, FOR SOLUTION INTRAMUSCULAR; INTRAVENOUS ONCE
Status: COMPLETED | OUTPATIENT
Start: 2017-12-24 | End: 2017-12-24

## 2017-12-24 RX ORDER — ESCITALOPRAM OXALATE 10 MG/1
10 TABLET ORAL DAILY
Status: DISCONTINUED | OUTPATIENT
Start: 2017-12-25 | End: 2017-12-31

## 2017-12-24 RX ORDER — CLOPIDOGREL BISULFATE 75 MG/1
75 TABLET ORAL DAILY
Status: DISCONTINUED | OUTPATIENT
Start: 2017-12-25 | End: 2018-01-11 | Stop reason: HOSPADM

## 2017-12-24 RX ORDER — METHYLPREDNISOLONE SODIUM SUCCINATE 125 MG/2ML
80 INJECTION, POWDER, LYOPHILIZED, FOR SOLUTION INTRAMUSCULAR; INTRAVENOUS EVERY 8 HOURS
Status: DISCONTINUED | OUTPATIENT
Start: 2017-12-25 | End: 2017-12-29

## 2017-12-24 RX ORDER — DEXTROSE MONOHYDRATE 25 G/50ML
25 INJECTION, SOLUTION INTRAVENOUS
Status: DISCONTINUED | OUTPATIENT
Start: 2017-12-24 | End: 2018-01-11 | Stop reason: HOSPADM

## 2017-12-24 RX ORDER — FLUTICASONE PROPIONATE 50 MCG
1 SPRAY, SUSPENSION (ML) NASAL DAILY
Status: DISCONTINUED | OUTPATIENT
Start: 2017-12-25 | End: 2018-01-11 | Stop reason: HOSPADM

## 2017-12-24 RX ORDER — LOSARTAN POTASSIUM 50 MG/1
100 TABLET ORAL DAILY
Status: DISCONTINUED | OUTPATIENT
Start: 2017-12-25 | End: 2017-12-27

## 2017-12-24 RX ORDER — ATORVASTATIN CALCIUM 40 MG/1
40 TABLET, FILM COATED ORAL DAILY
Status: DISCONTINUED | OUTPATIENT
Start: 2017-12-25 | End: 2018-01-11 | Stop reason: HOSPADM

## 2017-12-24 RX ORDER — TAMSULOSIN HYDROCHLORIDE 0.4 MG/1
0.4 CAPSULE ORAL NIGHTLY
Status: DISCONTINUED | OUTPATIENT
Start: 2017-12-24 | End: 2018-01-11 | Stop reason: HOSPADM

## 2017-12-24 RX ORDER — BACLOFEN 10 MG/1
10 TABLET ORAL 3 TIMES DAILY
Status: DISCONTINUED | OUTPATIENT
Start: 2017-12-24 | End: 2017-12-30

## 2017-12-24 RX ORDER — SODIUM CHLORIDE 0.9 % (FLUSH) 0.9 %
1-10 SYRINGE (ML) INJECTION AS NEEDED
Status: DISCONTINUED | OUTPATIENT
Start: 2017-12-24 | End: 2018-01-11 | Stop reason: HOSPADM

## 2017-12-24 RX ORDER — IPRATROPIUM BROMIDE AND ALBUTEROL SULFATE 2.5; .5 MG/3ML; MG/3ML
3 SOLUTION RESPIRATORY (INHALATION) ONCE
Status: COMPLETED | OUTPATIENT
Start: 2017-12-24 | End: 2017-12-24

## 2017-12-24 RX ORDER — BENZONATATE 100 MG/1
200 CAPSULE ORAL 3 TIMES DAILY PRN
Status: DISCONTINUED | OUTPATIENT
Start: 2017-12-24 | End: 2018-01-11 | Stop reason: HOSPADM

## 2017-12-24 RX ORDER — CYCLOBENZAPRINE HCL 10 MG
5 TABLET ORAL NIGHTLY PRN
Status: DISCONTINUED | OUTPATIENT
Start: 2017-12-24 | End: 2017-12-30

## 2017-12-24 RX ORDER — AMLODIPINE BESYLATE 5 MG/1
5 TABLET ORAL
Status: DISCONTINUED | OUTPATIENT
Start: 2017-12-25 | End: 2017-12-27

## 2017-12-24 RX ORDER — BUDESONIDE AND FORMOTEROL FUMARATE DIHYDRATE 80; 4.5 UG/1; UG/1
2 AEROSOL RESPIRATORY (INHALATION)
Status: DISCONTINUED | OUTPATIENT
Start: 2017-12-24 | End: 2017-12-24

## 2017-12-24 RX ORDER — PREDNISONE 10 MG/1
10 TABLET ORAL DAILY
Status: ON HOLD | COMMUNITY
End: 2017-12-27

## 2017-12-24 RX ORDER — GUAIFENESIN 600 MG/1
1200 TABLET, EXTENDED RELEASE ORAL EVERY 12 HOURS SCHEDULED
Status: DISCONTINUED | OUTPATIENT
Start: 2017-12-24 | End: 2017-12-30

## 2017-12-24 RX ORDER — BUDESONIDE 0.5 MG/2ML
0.5 INHALANT ORAL
Status: DISCONTINUED | OUTPATIENT
Start: 2017-12-24 | End: 2018-01-11 | Stop reason: HOSPADM

## 2017-12-24 RX ORDER — NICOTINE POLACRILEX 4 MG
1 LOZENGE BUCCAL
Status: DISCONTINUED | OUTPATIENT
Start: 2017-12-24 | End: 2018-01-11 | Stop reason: HOSPADM

## 2017-12-24 RX ORDER — SODIUM CHLORIDE 0.9 % (FLUSH) 0.9 %
10 SYRINGE (ML) INJECTION AS NEEDED
Status: DISCONTINUED | OUTPATIENT
Start: 2017-12-24 | End: 2017-12-24

## 2017-12-24 RX ORDER — MAGNESIUM SULFATE HEPTAHYDRATE 40 MG/ML
2 INJECTION, SOLUTION INTRAVENOUS ONCE
Status: COMPLETED | OUTPATIENT
Start: 2017-12-24 | End: 2017-12-24

## 2017-12-24 RX ORDER — CARVEDILOL 3.12 MG/1
3.12 TABLET ORAL EVERY 12 HOURS SCHEDULED
Status: DISCONTINUED | OUTPATIENT
Start: 2017-12-24 | End: 2018-01-11 | Stop reason: HOSPADM

## 2017-12-24 RX ORDER — OXYBUTYNIN CHLORIDE 5 MG/1
5 TABLET ORAL EVERY MORNING
Status: DISCONTINUED | OUTPATIENT
Start: 2017-12-25 | End: 2018-01-11 | Stop reason: HOSPADM

## 2017-12-24 RX ADMIN — CEFTRIAXONE SODIUM 1 G: 1 INJECTION, POWDER, FOR SOLUTION INTRAMUSCULAR; INTRAVENOUS at 18:12

## 2017-12-24 RX ADMIN — IPRATROPIUM BROMIDE AND ALBUTEROL SULFATE 3 ML: .5; 3 SOLUTION RESPIRATORY (INHALATION) at 15:54

## 2017-12-24 RX ADMIN — GUAIFENESIN 1200 MG: 600 TABLET, EXTENDED RELEASE ORAL at 21:48

## 2017-12-24 RX ADMIN — METHYLPREDNISOLONE SODIUM SUCCINATE 125 MG: 125 INJECTION, POWDER, FOR SOLUTION INTRAMUSCULAR; INTRAVENOUS at 15:48

## 2017-12-24 RX ADMIN — BACLOFEN 10 MG: 10 TABLET ORAL at 21:48

## 2017-12-24 RX ADMIN — IPRATROPIUM BROMIDE AND ALBUTEROL SULFATE 3 ML: .5; 3 SOLUTION RESPIRATORY (INHALATION) at 21:25

## 2017-12-24 RX ADMIN — CARVEDILOL 3.12 MG: 3.12 TABLET, FILM COATED ORAL at 21:48

## 2017-12-24 RX ADMIN — MAGNESIUM SULFATE HEPTAHYDRATE 2 G: 40 INJECTION, SOLUTION INTRAVENOUS at 15:49

## 2017-12-24 RX ADMIN — AZITHROMYCIN 500 MG: 250 TABLET, FILM COATED ORAL at 18:10

## 2017-12-24 RX ADMIN — TAMSULOSIN HYDROCHLORIDE 0.4 MG: 0.4 CAPSULE ORAL at 21:48

## 2017-12-24 RX ADMIN — ENOXAPARIN SODIUM 40 MG: 40 INJECTION SUBCUTANEOUS at 21:48

## 2017-12-24 RX ADMIN — BUDESONIDE 0.5 MG: 0.5 INHALANT RESPIRATORY (INHALATION) at 21:25

## 2017-12-25 LAB
GLUCOSE BLDC GLUCOMTR-MCNC: 134 MG/DL (ref 70–130)
GLUCOSE BLDC GLUCOMTR-MCNC: 147 MG/DL (ref 70–130)
GLUCOSE BLDC GLUCOMTR-MCNC: 160 MG/DL (ref 70–130)
GLUCOSE BLDC GLUCOMTR-MCNC: 205 MG/DL (ref 70–130)
TROPONIN I SERPL-MCNC: 0.05 NG/ML (ref 0–0.03)
TROPONIN I SERPL-MCNC: 0.07 NG/ML (ref 0–0.03)

## 2017-12-25 PROCEDURE — 25010000002 METHYLPREDNISOLONE PER 125 MG: Performed by: INTERNAL MEDICINE

## 2017-12-25 PROCEDURE — 99232 SBSQ HOSP IP/OBS MODERATE 35: CPT | Performed by: INTERNAL MEDICINE

## 2017-12-25 PROCEDURE — 84484 ASSAY OF TROPONIN QUANT: CPT | Performed by: INTERNAL MEDICINE

## 2017-12-25 PROCEDURE — 94660 CPAP INITIATION&MGMT: CPT

## 2017-12-25 PROCEDURE — 94799 UNLISTED PULMONARY SVC/PX: CPT

## 2017-12-25 PROCEDURE — 25010000002 ENOXAPARIN PER 10 MG: Performed by: INTERNAL MEDICINE

## 2017-12-25 PROCEDURE — 25010000002 CEFTRIAXONE IN SWFI 1 GRAM/10ML IV PUSH SYRINGE (SIMPLE): Performed by: INTERNAL MEDICINE

## 2017-12-25 PROCEDURE — 63710000001 INSULIN ASPART PER 5 UNITS: Performed by: INTERNAL MEDICINE

## 2017-12-25 PROCEDURE — 93005 ELECTROCARDIOGRAM TRACING: CPT | Performed by: INTERNAL MEDICINE

## 2017-12-25 PROCEDURE — 82962 GLUCOSE BLOOD TEST: CPT

## 2017-12-25 RX ORDER — NITROGLYCERIN 0.4 MG/1
TABLET SUBLINGUAL
Status: COMPLETED
Start: 2017-12-25 | End: 2017-12-25

## 2017-12-25 RX ORDER — ACETYLCYSTEINE 200 MG/ML
4 SOLUTION ORAL; RESPIRATORY (INHALATION) ONCE
Status: COMPLETED | OUTPATIENT
Start: 2017-12-25 | End: 2017-12-25

## 2017-12-25 RX ADMIN — SPIRONOLACTONE 12.5 MG: 25 TABLET ORAL at 08:30

## 2017-12-25 RX ADMIN — ENOXAPARIN SODIUM 40 MG: 40 INJECTION SUBCUTANEOUS at 20:16

## 2017-12-25 RX ADMIN — TRAMADOL HYDROCHLORIDE 50 MG: 50 TABLET, COATED ORAL at 08:34

## 2017-12-25 RX ADMIN — ATORVASTATIN CALCIUM 40 MG: 40 TABLET, FILM COATED ORAL at 08:31

## 2017-12-25 RX ADMIN — FINASTERIDE 5 MG: 5 TABLET, FILM COATED ORAL at 08:30

## 2017-12-25 RX ADMIN — NITROGLYCERIN 0.4 MG: 0.4 TABLET SUBLINGUAL at 15:21

## 2017-12-25 RX ADMIN — IPRATROPIUM BROMIDE AND ALBUTEROL SULFATE 3 ML: .5; 3 SOLUTION RESPIRATORY (INHALATION) at 18:38

## 2017-12-25 RX ADMIN — METHYLPREDNISOLONE SODIUM SUCCINATE 80 MG: 125 INJECTION, POWDER, FOR SOLUTION INTRAMUSCULAR; INTRAVENOUS at 16:25

## 2017-12-25 RX ADMIN — GUAIFENESIN 1200 MG: 600 TABLET, EXTENDED RELEASE ORAL at 20:14

## 2017-12-25 RX ADMIN — GUAIFENESIN 1200 MG: 600 TABLET, EXTENDED RELEASE ORAL at 08:29

## 2017-12-25 RX ADMIN — LOSARTAN POTASSIUM 100 MG: 50 TABLET, FILM COATED ORAL at 08:30

## 2017-12-25 RX ADMIN — METHYLPREDNISOLONE SODIUM SUCCINATE 80 MG: 125 INJECTION, POWDER, FOR SOLUTION INTRAMUSCULAR; INTRAVENOUS at 01:00

## 2017-12-25 RX ADMIN — BUDESONIDE 0.5 MG: 0.5 INHALANT RESPIRATORY (INHALATION) at 06:39

## 2017-12-25 RX ADMIN — ESCITALOPRAM OXALATE 10 MG: 10 TABLET ORAL at 08:29

## 2017-12-25 RX ADMIN — CEFTRIAXONE SODIUM 1 G: 1 INJECTION, POWDER, FOR SOLUTION INTRAMUSCULAR; INTRAVENOUS at 18:07

## 2017-12-25 RX ADMIN — OXYBUTYNIN CHLORIDE 5 MG: 5 TABLET ORAL at 06:33

## 2017-12-25 RX ADMIN — INSULIN ASPART 2 UNITS: 100 INJECTION, SOLUTION INTRAVENOUS; SUBCUTANEOUS at 21:27

## 2017-12-25 RX ADMIN — CLOPIDOGREL BISULFATE 75 MG: 75 TABLET ORAL at 08:29

## 2017-12-25 RX ADMIN — METHYLPREDNISOLONE SODIUM SUCCINATE 80 MG: 125 INJECTION, POWDER, FOR SOLUTION INTRAMUSCULAR; INTRAVENOUS at 08:30

## 2017-12-25 RX ADMIN — ACETYLCYSTEINE 4 ML: 200 INHALANT RESPIRATORY (INHALATION) at 12:56

## 2017-12-25 RX ADMIN — CARVEDILOL 3.12 MG: 3.12 TABLET, FILM COATED ORAL at 08:31

## 2017-12-25 RX ADMIN — IPRATROPIUM BROMIDE AND ALBUTEROL SULFATE 3 ML: .5; 3 SOLUTION RESPIRATORY (INHALATION) at 06:39

## 2017-12-25 RX ADMIN — AMLODIPINE BESYLATE 5 MG: 5 TABLET ORAL at 08:30

## 2017-12-25 RX ADMIN — BUDESONIDE 0.5 MG: 0.5 INHALANT RESPIRATORY (INHALATION) at 18:38

## 2017-12-25 RX ADMIN — TRAMADOL HYDROCHLORIDE 50 MG: 50 TABLET, COATED ORAL at 22:42

## 2017-12-25 RX ADMIN — CARVEDILOL 3.12 MG: 3.12 TABLET, FILM COATED ORAL at 20:14

## 2017-12-25 RX ADMIN — TAMSULOSIN HYDROCHLORIDE 0.4 MG: 0.4 CAPSULE ORAL at 20:14

## 2017-12-25 RX ADMIN — IPRATROPIUM BROMIDE AND ALBUTEROL SULFATE 3 ML: .5; 3 SOLUTION RESPIRATORY (INHALATION) at 12:56

## 2017-12-25 RX ADMIN — INSULIN ASPART 3 UNITS: 100 INJECTION, SOLUTION INTRAVENOUS; SUBCUTANEOUS at 18:07

## 2017-12-25 RX ADMIN — BACLOFEN 10 MG: 10 TABLET ORAL at 16:25

## 2017-12-25 RX ADMIN — HYDROCODONE POLISTIREX AND CHLORPHENIRAMINE POLISTIREX 5 ML: 10; 8 SUSPENSION, EXTENDED RELEASE ORAL at 18:07

## 2017-12-25 RX ADMIN — FLUTICASONE PROPIONATE 1 SPRAY: 50 SPRAY, METERED NASAL at 08:31

## 2017-12-25 RX ADMIN — BENZONATATE 200 MG: 100 CAPSULE ORAL at 22:42

## 2017-12-25 RX ADMIN — HYDROCODONE POLISTIREX AND CHLORPHENIRAMINE POLISTIREX 5 ML: 10; 8 SUSPENSION, EXTENDED RELEASE ORAL at 08:29

## 2017-12-25 RX ADMIN — BACLOFEN 10 MG: 10 TABLET ORAL at 20:14

## 2017-12-25 RX ADMIN — BACLOFEN 10 MG: 10 TABLET ORAL at 08:29

## 2017-12-26 LAB
ALBUMIN SERPL-MCNC: 3.8 G/DL (ref 3.5–5)
ALBUMIN/GLOB SERPL: 1.5 G/DL (ref 1–2)
ALP SERPL-CCNC: 68 U/L (ref 38–126)
ALT SERPL W P-5'-P-CCNC: 32 U/L (ref 13–69)
ANION GAP SERPL CALCULATED.3IONS-SCNC: 9 MMOL/L
AST SERPL-CCNC: 30 U/L (ref 15–46)
BASOPHILS # BLD AUTO: 0.01 10*3/MM3 (ref 0–0.2)
BASOPHILS NFR BLD AUTO: 0.1 % (ref 0–2.5)
BILIRUB SERPL-MCNC: 0.4 MG/DL (ref 0.2–1.3)
BUN BLD-MCNC: 45 MG/DL (ref 7–20)
BUN/CREAT SERPL: 40.9 (ref 6.3–21.9)
CALCIUM SPEC-SCNC: 9.3 MG/DL (ref 8.4–10.2)
CHLORIDE SERPL-SCNC: 100 MMOL/L (ref 98–107)
CO2 SERPL-SCNC: 39 MMOL/L (ref 26–30)
CREAT BLD-MCNC: 1.1 MG/DL (ref 0.6–1.3)
DEPRECATED RDW RBC AUTO: 45.8 FL (ref 37–54)
EOSINOPHIL # BLD AUTO: 0 10*3/MM3 (ref 0–0.7)
EOSINOPHIL NFR BLD AUTO: 0 % (ref 0–7)
ERYTHROCYTE [DISTWIDTH] IN BLOOD BY AUTOMATED COUNT: 12.8 % (ref 11.5–14.5)
GFR SERPL CREATININE-BSD FRML MDRD: 67 ML/MIN/1.73
GLOBULIN UR ELPH-MCNC: 2.5 GM/DL
GLUCOSE BLD-MCNC: 120 MG/DL (ref 74–98)
GLUCOSE BLDC GLUCOMTR-MCNC: 121 MG/DL (ref 70–130)
GLUCOSE BLDC GLUCOMTR-MCNC: 127 MG/DL (ref 70–130)
GLUCOSE BLDC GLUCOMTR-MCNC: 138 MG/DL (ref 70–130)
GLUCOSE BLDC GLUCOMTR-MCNC: 145 MG/DL (ref 70–130)
HCT VFR BLD AUTO: 38.7 % (ref 42–52)
HGB BLD-MCNC: 11.9 G/DL (ref 14–18)
IMM GRANULOCYTES # BLD: 0.05 10*3/MM3 (ref 0–0.06)
IMM GRANULOCYTES NFR BLD: 0.4 % (ref 0–0.6)
LYMPHOCYTES # BLD AUTO: 0.45 10*3/MM3 (ref 0.6–3.4)
LYMPHOCYTES NFR BLD AUTO: 3.3 % (ref 10–50)
MCH RBC QN AUTO: 29.7 PG (ref 27–31)
MCHC RBC AUTO-ENTMCNC: 30.7 G/DL (ref 30–37)
MCV RBC AUTO: 96.5 FL (ref 80–94)
MONOCYTES # BLD AUTO: 0.59 10*3/MM3 (ref 0–0.9)
MONOCYTES NFR BLD AUTO: 4.4 % (ref 0–12)
NEUTROPHILS # BLD AUTO: 12.43 10*3/MM3 (ref 2–6.9)
NEUTROPHILS NFR BLD AUTO: 91.8 % (ref 37–80)
NRBC BLD MANUAL-RTO: 0 /100 WBC (ref 0–0)
PLATELET # BLD AUTO: 204 10*3/MM3 (ref 130–400)
PMV BLD AUTO: 9.5 FL (ref 6–12)
POTASSIUM BLD-SCNC: 5 MMOL/L (ref 3.5–5.1)
PROT SERPL-MCNC: 6.3 G/DL (ref 6.3–8.2)
RBC # BLD AUTO: 4.01 10*6/MM3 (ref 4.7–6.1)
SODIUM BLD-SCNC: 143 MMOL/L (ref 137–145)
WBC NRBC COR # BLD: 13.53 10*3/MM3 (ref 4.8–10.8)

## 2017-12-26 PROCEDURE — 94660 CPAP INITIATION&MGMT: CPT

## 2017-12-26 PROCEDURE — 94799 UNLISTED PULMONARY SVC/PX: CPT

## 2017-12-26 PROCEDURE — 82962 GLUCOSE BLOOD TEST: CPT

## 2017-12-26 PROCEDURE — 25010000002 METHYLPREDNISOLONE PER 125 MG: Performed by: INTERNAL MEDICINE

## 2017-12-26 PROCEDURE — 80053 COMPREHEN METABOLIC PANEL: CPT | Performed by: INTERNAL MEDICINE

## 2017-12-26 PROCEDURE — 25010000002 CEFTRIAXONE IN SWFI 1 GRAM/10ML IV PUSH SYRINGE (SIMPLE): Performed by: INTERNAL MEDICINE

## 2017-12-26 PROCEDURE — 25010000002 ENOXAPARIN PER 10 MG: Performed by: INTERNAL MEDICINE

## 2017-12-26 PROCEDURE — 99232 SBSQ HOSP IP/OBS MODERATE 35: CPT | Performed by: INTERNAL MEDICINE

## 2017-12-26 PROCEDURE — 85025 COMPLETE CBC W/AUTO DIFF WBC: CPT | Performed by: INTERNAL MEDICINE

## 2017-12-26 RX ORDER — IPRATROPIUM BROMIDE AND ALBUTEROL SULFATE 2.5; .5 MG/3ML; MG/3ML
3 SOLUTION RESPIRATORY (INHALATION)
Status: DISCONTINUED | OUTPATIENT
Start: 2017-12-26 | End: 2018-01-04

## 2017-12-26 RX ORDER — ALPRAZOLAM 0.5 MG/1
0.5 TABLET ORAL 3 TIMES DAILY PRN
Status: DISCONTINUED | OUTPATIENT
Start: 2017-12-26 | End: 2017-12-30

## 2017-12-26 RX ADMIN — FLUTICASONE PROPIONATE 1 SPRAY: 50 SPRAY, METERED NASAL at 08:54

## 2017-12-26 RX ADMIN — ALPRAZOLAM 0.5 MG: 0.5 TABLET ORAL at 07:19

## 2017-12-26 RX ADMIN — FINASTERIDE 5 MG: 5 TABLET, FILM COATED ORAL at 08:52

## 2017-12-26 RX ADMIN — CARVEDILOL 3.12 MG: 3.12 TABLET, FILM COATED ORAL at 21:03

## 2017-12-26 RX ADMIN — ENOXAPARIN SODIUM 40 MG: 40 INJECTION SUBCUTANEOUS at 21:03

## 2017-12-26 RX ADMIN — ALBUTEROL SULFATE 2.5 MG: 2.5 SOLUTION RESPIRATORY (INHALATION) at 02:45

## 2017-12-26 RX ADMIN — HYDROCODONE POLISTIREX AND CHLORPHENIRAMINE POLISTIREX 5 ML: 10; 8 SUSPENSION, EXTENDED RELEASE ORAL at 08:49

## 2017-12-26 RX ADMIN — ESCITALOPRAM OXALATE 10 MG: 10 TABLET ORAL at 08:50

## 2017-12-26 RX ADMIN — METHYLPREDNISOLONE SODIUM SUCCINATE 80 MG: 125 INJECTION, POWDER, FOR SOLUTION INTRAMUSCULAR; INTRAVENOUS at 00:11

## 2017-12-26 RX ADMIN — AMLODIPINE BESYLATE 5 MG: 5 TABLET ORAL at 08:51

## 2017-12-26 RX ADMIN — CARVEDILOL 3.12 MG: 3.12 TABLET, FILM COATED ORAL at 08:51

## 2017-12-26 RX ADMIN — GUAIFENESIN 1200 MG: 600 TABLET, EXTENDED RELEASE ORAL at 08:50

## 2017-12-26 RX ADMIN — IPRATROPIUM BROMIDE AND ALBUTEROL SULFATE 3 ML: .5; 3 SOLUTION RESPIRATORY (INHALATION) at 20:08

## 2017-12-26 RX ADMIN — GUAIFENESIN 1200 MG: 600 TABLET, EXTENDED RELEASE ORAL at 21:03

## 2017-12-26 RX ADMIN — SPIRONOLACTONE 12.5 MG: 25 TABLET ORAL at 08:52

## 2017-12-26 RX ADMIN — BACLOFEN 10 MG: 10 TABLET ORAL at 21:03

## 2017-12-26 RX ADMIN — BUDESONIDE 0.5 MG: 0.5 INHALANT RESPIRATORY (INHALATION) at 20:08

## 2017-12-26 RX ADMIN — LOSARTAN POTASSIUM 100 MG: 50 TABLET, FILM COATED ORAL at 08:52

## 2017-12-26 RX ADMIN — IPRATROPIUM BROMIDE AND ALBUTEROL SULFATE 3 ML: .5; 3 SOLUTION RESPIRATORY (INHALATION) at 13:05

## 2017-12-26 RX ADMIN — HYDROCODONE POLISTIREX AND CHLORPHENIRAMINE POLISTIREX 5 ML: 10; 8 SUSPENSION, EXTENDED RELEASE ORAL at 21:09

## 2017-12-26 RX ADMIN — ATORVASTATIN CALCIUM 40 MG: 40 TABLET, FILM COATED ORAL at 08:50

## 2017-12-26 RX ADMIN — CLOPIDOGREL BISULFATE 75 MG: 75 TABLET ORAL at 08:50

## 2017-12-26 RX ADMIN — BACLOFEN 10 MG: 10 TABLET ORAL at 15:54

## 2017-12-26 RX ADMIN — BACLOFEN 10 MG: 10 TABLET ORAL at 08:50

## 2017-12-26 RX ADMIN — CEFTRIAXONE SODIUM 1 G: 1 INJECTION, POWDER, FOR SOLUTION INTRAMUSCULAR; INTRAVENOUS at 17:41

## 2017-12-26 RX ADMIN — METHYLPREDNISOLONE SODIUM SUCCINATE 80 MG: 125 INJECTION, POWDER, FOR SOLUTION INTRAMUSCULAR; INTRAVENOUS at 15:55

## 2017-12-26 RX ADMIN — ALPRAZOLAM 0.5 MG: 0.5 TABLET ORAL at 16:47

## 2017-12-26 RX ADMIN — METHYLPREDNISOLONE SODIUM SUCCINATE 80 MG: 125 INJECTION, POWDER, FOR SOLUTION INTRAMUSCULAR; INTRAVENOUS at 08:50

## 2017-12-26 RX ADMIN — OXYBUTYNIN CHLORIDE 5 MG: 5 TABLET ORAL at 06:54

## 2017-12-26 RX ADMIN — TAMSULOSIN HYDROCHLORIDE 0.4 MG: 0.4 CAPSULE ORAL at 21:03

## 2017-12-26 RX ADMIN — TRAMADOL HYDROCHLORIDE 50 MG: 50 TABLET, COATED ORAL at 18:42

## 2017-12-26 RX ADMIN — IPRATROPIUM BROMIDE AND ALBUTEROL SULFATE 3 ML: .5; 3 SOLUTION RESPIRATORY (INHALATION) at 00:50

## 2017-12-26 RX ADMIN — IPRATROPIUM BROMIDE AND ALBUTEROL SULFATE 3 ML: .5; 3 SOLUTION RESPIRATORY (INHALATION) at 07:09

## 2017-12-26 RX ADMIN — BUDESONIDE 0.5 MG: 0.5 INHALANT RESPIRATORY (INHALATION) at 07:09

## 2017-12-27 ENCOUNTER — APPOINTMENT (OUTPATIENT)
Dept: GENERAL RADIOLOGY | Facility: HOSPITAL | Age: 67
End: 2017-12-27

## 2017-12-27 LAB
ANION GAP SERPL CALCULATED.3IONS-SCNC: 8.9 MMOL/L
ARTERIAL PATENCY WRIST A: POSITIVE
BASE EXCESS BLDA CALC-SCNC: 10.2 MMOL/L
BASE EXCESS BLDA CALC-SCNC: 11.9 MMOL/L
BASE EXCESS BLDA CALC-SCNC: 12.1 MMOL/L
BDY SITE: ABNORMAL
BUN BLD-MCNC: 51 MG/DL (ref 7–20)
BUN/CREAT SERPL: 42.5 (ref 6.3–21.9)
CALCIUM SPEC-SCNC: 9.1 MG/DL (ref 8.4–10.2)
CHLORIDE SERPL-SCNC: 101 MMOL/L (ref 98–107)
CO2 SERPL-SCNC: 39 MMOL/L (ref 26–30)
COHGB MFR BLD: 1.2 %
COHGB MFR BLD: 1.3 %
COHGB MFR BLD: 1.4 %
CREAT BLD-MCNC: 1.2 MG/DL (ref 0.6–1.3)
DEPRECATED RDW RBC AUTO: 46 FL (ref 37–54)
ERYTHROCYTE [DISTWIDTH] IN BLOOD BY AUTOMATED COUNT: 12.9 % (ref 11.5–14.5)
GFR SERPL CREATININE-BSD FRML MDRD: 60 ML/MIN/1.73
GLUCOSE BLD-MCNC: 136 MG/DL (ref 74–98)
GLUCOSE BLDC GLUCOMTR-MCNC: 116 MG/DL (ref 70–130)
GLUCOSE BLDC GLUCOMTR-MCNC: 128 MG/DL (ref 70–130)
GLUCOSE BLDC GLUCOMTR-MCNC: 134 MG/DL (ref 70–130)
GLUCOSE BLDC GLUCOMTR-MCNC: 142 MG/DL (ref 70–130)
HCO3 BLDA-SCNC: 37.3 MMOL/L (ref 22–28)
HCO3 BLDA-SCNC: 38 MMOL/L (ref 22–28)
HCO3 BLDA-SCNC: 38 MMOL/L (ref 22–28)
HCT VFR BLD AUTO: 39.8 % (ref 42–52)
HGB BLD-MCNC: 12.1 G/DL (ref 14–18)
HGB BLDA-MCNC: 11.5 G/DL (ref 12–18)
HGB BLDA-MCNC: 11.9 G/DL (ref 12–18)
HGB BLDA-MCNC: 12.7 G/DL (ref 12–18)
HOROWITZ INDEX BLD+IHG-RTO: 40 %
HOROWITZ INDEX BLD+IHG-RTO: 40 %
HOROWITZ INDEX BLD+IHG-RTO: 44 %
MCH RBC QN AUTO: 29.7 PG (ref 27–31)
MCHC RBC AUTO-ENTMCNC: 30.4 G/DL (ref 30–37)
MCV RBC AUTO: 97.8 FL (ref 80–94)
METHGB BLD QL: 0.3 %
METHGB BLD QL: 0.5 %
METHGB BLD QL: 0.6 %
MODALITY: ABNORMAL
OXYHGB MFR BLDV: 92.2 % (ref 94–99)
OXYHGB MFR BLDV: 92.8 % (ref 94–99)
OXYHGB MFR BLDV: 94.7 % (ref 94–99)
PCO2 BLDA: 71.6 MM HG (ref 35–45)
PCO2 BLDA: 74 MM HG (ref 35–45)
PCO2 BLDA: 83.8 MM HG (ref 35–45)
PH BLDA: 7.26 PH UNITS (ref 7.3–7.5)
PH BLDA: 7.32 PH UNITS (ref 7.3–7.5)
PH BLDA: 7.33 PH UNITS (ref 7.3–7.5)
PLATELET # BLD AUTO: 193 10*3/MM3 (ref 130–400)
PMV BLD AUTO: 9.2 FL (ref 6–12)
PO2 BLDA: 67.5 MM HG (ref 75–100)
PO2 BLDA: 68.8 MM HG (ref 75–100)
PO2 BLDA: 84.9 MM HG (ref 75–100)
POTASSIUM BLD-SCNC: 4.9 MMOL/L (ref 3.5–5.1)
RBC # BLD AUTO: 4.07 10*6/MM3 (ref 4.7–6.1)
SAO2 % BLDCOA: 94 %
SAO2 % BLDCOA: 94.4 %
SAO2 % BLDCOA: 96.3 %
SODIUM BLD-SCNC: 144 MMOL/L (ref 137–145)
WBC NRBC COR # BLD: 12.63 10*3/MM3 (ref 4.8–10.8)

## 2017-12-27 PROCEDURE — 25010000002 METHYLPREDNISOLONE PER 125 MG: Performed by: INTERNAL MEDICINE

## 2017-12-27 PROCEDURE — 94799 UNLISTED PULMONARY SVC/PX: CPT

## 2017-12-27 PROCEDURE — 82962 GLUCOSE BLOOD TEST: CPT

## 2017-12-27 PROCEDURE — 99233 SBSQ HOSP IP/OBS HIGH 50: CPT | Performed by: INTERNAL MEDICINE

## 2017-12-27 PROCEDURE — 25010000002 VANCOMYCIN PER 500 MG: Performed by: INTERNAL MEDICINE

## 2017-12-27 PROCEDURE — 82375 ASSAY CARBOXYHB QUANT: CPT

## 2017-12-27 PROCEDURE — 85027 COMPLETE CBC AUTOMATED: CPT | Performed by: INTERNAL MEDICINE

## 2017-12-27 PROCEDURE — 82805 BLOOD GASES W/O2 SATURATION: CPT

## 2017-12-27 PROCEDURE — 83050 HGB METHEMOGLOBIN QUAN: CPT

## 2017-12-27 PROCEDURE — 25010000002 ENOXAPARIN PER 10 MG: Performed by: INTERNAL MEDICINE

## 2017-12-27 PROCEDURE — 36600 WITHDRAWAL OF ARTERIAL BLOOD: CPT

## 2017-12-27 PROCEDURE — 80048 BASIC METABOLIC PNL TOTAL CA: CPT | Performed by: INTERNAL MEDICINE

## 2017-12-27 PROCEDURE — 71010 HC CHEST PA OR AP: CPT

## 2017-12-27 PROCEDURE — 94660 CPAP INITIATION&MGMT: CPT

## 2017-12-27 RX ORDER — PREDNISONE 10 MG/1
10 TABLET ORAL DAILY
COMMUNITY
End: 2018-03-20 | Stop reason: SDUPTHER

## 2017-12-27 RX ORDER — LOSARTAN POTASSIUM 50 MG/1
50 TABLET ORAL DAILY
Status: DISCONTINUED | OUTPATIENT
Start: 2017-12-28 | End: 2018-01-11 | Stop reason: HOSPADM

## 2017-12-27 RX ORDER — AMLODIPINE BESYLATE 5 MG/1
5 TABLET ORAL
Status: DISCONTINUED | OUTPATIENT
Start: 2017-12-28 | End: 2018-01-11 | Stop reason: HOSPADM

## 2017-12-27 RX ORDER — ALPRAZOLAM 0.5 MG/1
0.5 TABLET ORAL ONCE
Status: COMPLETED | OUTPATIENT
Start: 2017-12-28 | End: 2017-12-27

## 2017-12-27 RX ADMIN — AMLODIPINE BESYLATE 5 MG: 5 TABLET ORAL at 08:57

## 2017-12-27 RX ADMIN — FINASTERIDE 5 MG: 5 TABLET, FILM COATED ORAL at 08:57

## 2017-12-27 RX ADMIN — TRAMADOL HYDROCHLORIDE 50 MG: 50 TABLET, COATED ORAL at 00:39

## 2017-12-27 RX ADMIN — BACLOFEN 10 MG: 10 TABLET ORAL at 20:24

## 2017-12-27 RX ADMIN — OXYBUTYNIN CHLORIDE 5 MG: 5 TABLET ORAL at 06:00

## 2017-12-27 RX ADMIN — VANCOMYCIN HYDROCHLORIDE 1500 MG: 500 INJECTION, POWDER, LYOPHILIZED, FOR SOLUTION INTRAVENOUS at 17:24

## 2017-12-27 RX ADMIN — TAMSULOSIN HYDROCHLORIDE 0.4 MG: 0.4 CAPSULE ORAL at 20:23

## 2017-12-27 RX ADMIN — METHYLPREDNISOLONE SODIUM SUCCINATE 80 MG: 125 INJECTION, POWDER, FOR SOLUTION INTRAMUSCULAR; INTRAVENOUS at 23:37

## 2017-12-27 RX ADMIN — TRAMADOL HYDROCHLORIDE 50 MG: 50 TABLET, COATED ORAL at 23:02

## 2017-12-27 RX ADMIN — IPRATROPIUM BROMIDE AND ALBUTEROL SULFATE 3 ML: .5; 3 SOLUTION RESPIRATORY (INHALATION) at 07:00

## 2017-12-27 RX ADMIN — IPRATROPIUM BROMIDE AND ALBUTEROL SULFATE 3 ML: .5; 3 SOLUTION RESPIRATORY (INHALATION) at 11:14

## 2017-12-27 RX ADMIN — METHYLPREDNISOLONE SODIUM SUCCINATE 80 MG: 125 INJECTION, POWDER, FOR SOLUTION INTRAMUSCULAR; INTRAVENOUS at 17:25

## 2017-12-27 RX ADMIN — METHYLPREDNISOLONE SODIUM SUCCINATE 80 MG: 125 INJECTION, POWDER, FOR SOLUTION INTRAMUSCULAR; INTRAVENOUS at 08:58

## 2017-12-27 RX ADMIN — CARVEDILOL 3.12 MG: 3.12 TABLET, FILM COATED ORAL at 20:23

## 2017-12-27 RX ADMIN — BUDESONIDE 0.5 MG: 0.5 INHALANT RESPIRATORY (INHALATION) at 19:24

## 2017-12-27 RX ADMIN — HYDROCODONE POLISTIREX AND CHLORPHENIRAMINE POLISTIREX 5 ML: 10; 8 SUSPENSION, EXTENDED RELEASE ORAL at 08:57

## 2017-12-27 RX ADMIN — BACLOFEN 10 MG: 10 TABLET ORAL at 17:25

## 2017-12-27 RX ADMIN — ALPRAZOLAM 0.5 MG: 0.5 TABLET ORAL at 02:23

## 2017-12-27 RX ADMIN — ALPRAZOLAM 0.5 MG: 0.5 TABLET ORAL at 23:36

## 2017-12-27 RX ADMIN — LOSARTAN POTASSIUM 100 MG: 50 TABLET, FILM COATED ORAL at 08:58

## 2017-12-27 RX ADMIN — IPRATROPIUM BROMIDE AND ALBUTEROL SULFATE 3 ML: .5; 3 SOLUTION RESPIRATORY (INHALATION) at 00:00

## 2017-12-27 RX ADMIN — SPIRONOLACTONE 12.5 MG: 25 TABLET ORAL at 08:57

## 2017-12-27 RX ADMIN — GUAIFENESIN 1200 MG: 600 TABLET, EXTENDED RELEASE ORAL at 08:57

## 2017-12-27 RX ADMIN — IPRATROPIUM BROMIDE AND ALBUTEROL SULFATE 3 ML: .5; 3 SOLUTION RESPIRATORY (INHALATION) at 19:24

## 2017-12-27 RX ADMIN — METHYLPREDNISOLONE SODIUM SUCCINATE 80 MG: 125 INJECTION, POWDER, FOR SOLUTION INTRAMUSCULAR; INTRAVENOUS at 00:39

## 2017-12-27 RX ADMIN — IPRATROPIUM BROMIDE AND ALBUTEROL SULFATE 3 ML: .5; 3 SOLUTION RESPIRATORY (INHALATION) at 15:15

## 2017-12-27 RX ADMIN — IPRATROPIUM BROMIDE AND ALBUTEROL SULFATE 3 ML: .5; 3 SOLUTION RESPIRATORY (INHALATION) at 04:12

## 2017-12-27 RX ADMIN — ENOXAPARIN SODIUM 40 MG: 40 INJECTION SUBCUTANEOUS at 20:23

## 2017-12-27 RX ADMIN — CLOPIDOGREL BISULFATE 75 MG: 75 TABLET ORAL at 08:57

## 2017-12-27 RX ADMIN — CEFTAZIDIME 2 G: 2 INJECTION, POWDER, FOR SOLUTION INTRAVENOUS at 21:58

## 2017-12-27 RX ADMIN — ATORVASTATIN CALCIUM 40 MG: 40 TABLET, FILM COATED ORAL at 08:57

## 2017-12-27 RX ADMIN — FLUTICASONE PROPIONATE 1 SPRAY: 50 SPRAY, METERED NASAL at 08:58

## 2017-12-27 RX ADMIN — IPRATROPIUM BROMIDE AND ALBUTEROL SULFATE 3 ML: .5; 3 SOLUTION RESPIRATORY (INHALATION) at 23:05

## 2017-12-27 RX ADMIN — BUDESONIDE 0.5 MG: 0.5 INHALANT RESPIRATORY (INHALATION) at 07:00

## 2017-12-27 RX ADMIN — ESCITALOPRAM OXALATE 10 MG: 10 TABLET ORAL at 08:57

## 2017-12-27 RX ADMIN — BACLOFEN 10 MG: 10 TABLET ORAL at 08:57

## 2017-12-27 RX ADMIN — CEFTAZIDIME 2 G: 2 INJECTION, POWDER, FOR SOLUTION INTRAVENOUS at 08:58

## 2017-12-27 RX ADMIN — CARVEDILOL 3.12 MG: 3.12 TABLET, FILM COATED ORAL at 08:57

## 2017-12-27 RX ADMIN — ALPRAZOLAM 0.5 MG: 0.5 TABLET ORAL at 15:28

## 2017-12-28 LAB
ANION GAP SERPL CALCULATED.3IONS-SCNC: 9 MMOL/L
ARTERIAL PATENCY WRIST A: POSITIVE
BASE EXCESS BLDA CALC-SCNC: 11.1 MMOL/L
BDY SITE: ABNORMAL
BUN BLD-MCNC: 67 MG/DL (ref 7–20)
BUN/CREAT SERPL: 67 (ref 6.3–21.9)
CALCIUM SPEC-SCNC: 9.8 MG/DL (ref 8.4–10.2)
CHLORIDE SERPL-SCNC: 105 MMOL/L (ref 98–107)
CO2 SERPL-SCNC: 36 MMOL/L (ref 26–30)
COHGB MFR BLD: 1.4 %
CREAT BLD-MCNC: 1 MG/DL (ref 0.6–1.3)
DEPRECATED RDW RBC AUTO: 46 FL (ref 37–54)
ERYTHROCYTE [DISTWIDTH] IN BLOOD BY AUTOMATED COUNT: 13 % (ref 11.5–14.5)
GFR SERPL CREATININE-BSD FRML MDRD: 75 ML/MIN/1.73
GLUCOSE BLD-MCNC: 111 MG/DL (ref 74–98)
GLUCOSE BLDC GLUCOMTR-MCNC: 108 MG/DL (ref 70–130)
GLUCOSE BLDC GLUCOMTR-MCNC: 112 MG/DL (ref 70–130)
GLUCOSE BLDC GLUCOMTR-MCNC: 150 MG/DL (ref 70–130)
GLUCOSE BLDC GLUCOMTR-MCNC: 152 MG/DL (ref 70–130)
HCO3 BLDA-SCNC: 36.8 MMOL/L (ref 22–28)
HCT VFR BLD AUTO: 47.4 % (ref 42–52)
HGB BLD-MCNC: 14.6 G/DL (ref 14–18)
HGB BLDA-MCNC: 11.9 G/DL (ref 12–18)
HOROWITZ INDEX BLD+IHG-RTO: 40 %
MCH RBC QN AUTO: 29.7 PG (ref 27–31)
MCHC RBC AUTO-ENTMCNC: 30.8 G/DL (ref 30–37)
MCV RBC AUTO: 96.5 FL (ref 80–94)
METHGB BLD QL: 0.9 %
MODALITY: ABNORMAL
OXYHGB MFR BLDV: 92.9 % (ref 94–99)
PCO2 BLDA: 68 MM HG (ref 35–45)
PH BLDA: 7.34 PH UNITS (ref 7.3–7.5)
PLATELET # BLD AUTO: 141 10*3/MM3 (ref 130–400)
PMV BLD AUTO: 9.6 FL (ref 6–12)
PO2 BLDA: 73.4 MM HG (ref 75–100)
POTASSIUM BLD-SCNC: 5 MMOL/L (ref 3.5–5.1)
RBC # BLD AUTO: 4.91 10*6/MM3 (ref 4.7–6.1)
SAO2 % BLDCOA: 95.1 %
SODIUM BLD-SCNC: 145 MMOL/L (ref 137–145)
WBC NRBC COR # BLD: 9.17 10*3/MM3 (ref 4.8–10.8)

## 2017-12-28 PROCEDURE — 97162 PT EVAL MOD COMPLEX 30 MIN: CPT

## 2017-12-28 PROCEDURE — 97166 OT EVAL MOD COMPLEX 45 MIN: CPT

## 2017-12-28 PROCEDURE — 80048 BASIC METABOLIC PNL TOTAL CA: CPT | Performed by: INTERNAL MEDICINE

## 2017-12-28 PROCEDURE — 25010000002 METHYLPREDNISOLONE PER 125 MG: Performed by: INTERNAL MEDICINE

## 2017-12-28 PROCEDURE — 94660 CPAP INITIATION&MGMT: CPT

## 2017-12-28 PROCEDURE — 25010000002 VANCOMYCIN PER 500 MG: Performed by: INTERNAL MEDICINE

## 2017-12-28 PROCEDURE — 99232 SBSQ HOSP IP/OBS MODERATE 35: CPT | Performed by: INTERNAL MEDICINE

## 2017-12-28 PROCEDURE — 82962 GLUCOSE BLOOD TEST: CPT

## 2017-12-28 PROCEDURE — 36600 WITHDRAWAL OF ARTERIAL BLOOD: CPT

## 2017-12-28 PROCEDURE — 94799 UNLISTED PULMONARY SVC/PX: CPT

## 2017-12-28 PROCEDURE — 83050 HGB METHEMOGLOBIN QUAN: CPT

## 2017-12-28 PROCEDURE — 82375 ASSAY CARBOXYHB QUANT: CPT

## 2017-12-28 PROCEDURE — 85027 COMPLETE CBC AUTOMATED: CPT | Performed by: INTERNAL MEDICINE

## 2017-12-28 PROCEDURE — 82805 BLOOD GASES W/O2 SATURATION: CPT

## 2017-12-28 PROCEDURE — 25010000002 ENOXAPARIN PER 10 MG: Performed by: INTERNAL MEDICINE

## 2017-12-28 PROCEDURE — 25010000002 HYDROMORPHONE PER 4 MG: Performed by: FAMILY MEDICINE

## 2017-12-28 RX ORDER — LORAZEPAM 2 MG/ML
0.5 INJECTION INTRAMUSCULAR EVERY 6 HOURS PRN
Status: DISCONTINUED | OUTPATIENT
Start: 2017-12-28 | End: 2017-12-29

## 2017-12-28 RX ADMIN — IPRATROPIUM BROMIDE AND ALBUTEROL SULFATE 3 ML: .5; 3 SOLUTION RESPIRATORY (INHALATION) at 23:00

## 2017-12-28 RX ADMIN — METHYLPREDNISOLONE SODIUM SUCCINATE 80 MG: 125 INJECTION, POWDER, FOR SOLUTION INTRAMUSCULAR; INTRAVENOUS at 08:38

## 2017-12-28 RX ADMIN — HYDROCODONE POLISTIREX AND CHLORPHENIRAMINE POLISTIREX 5 ML: 10; 8 SUSPENSION, EXTENDED RELEASE ORAL at 22:40

## 2017-12-28 RX ADMIN — CARVEDILOL 3.12 MG: 3.12 TABLET, FILM COATED ORAL at 08:38

## 2017-12-28 RX ADMIN — HYDROCODONE POLISTIREX AND CHLORPHENIRAMINE POLISTIREX 5 ML: 10; 8 SUSPENSION, EXTENDED RELEASE ORAL at 08:39

## 2017-12-28 RX ADMIN — CEFTAZIDIME 2 G: 2 INJECTION, POWDER, FOR SOLUTION INTRAVENOUS at 20:51

## 2017-12-28 RX ADMIN — VANCOMYCIN HYDROCHLORIDE 1000 MG: 1 INJECTION, POWDER, LYOPHILIZED, FOR SOLUTION INTRAVENOUS at 10:30

## 2017-12-28 RX ADMIN — CEFTAZIDIME 2 G: 2 INJECTION, POWDER, FOR SOLUTION INTRAVENOUS at 13:00

## 2017-12-28 RX ADMIN — HYDROCODONE POLISTIREX AND CHLORPHENIRAMINE POLISTIREX 5 ML: 10; 8 SUSPENSION, EXTENDED RELEASE ORAL at 02:52

## 2017-12-28 RX ADMIN — IPRATROPIUM BROMIDE AND ALBUTEROL SULFATE 3 ML: .5; 3 SOLUTION RESPIRATORY (INHALATION) at 03:03

## 2017-12-28 RX ADMIN — ENOXAPARIN SODIUM 40 MG: 40 INJECTION SUBCUTANEOUS at 20:52

## 2017-12-28 RX ADMIN — FINASTERIDE 5 MG: 5 TABLET, FILM COATED ORAL at 08:38

## 2017-12-28 RX ADMIN — BUDESONIDE 0.5 MG: 0.5 INHALANT RESPIRATORY (INHALATION) at 07:06

## 2017-12-28 RX ADMIN — BACLOFEN 10 MG: 10 TABLET ORAL at 20:52

## 2017-12-28 RX ADMIN — BACLOFEN 10 MG: 10 TABLET ORAL at 08:38

## 2017-12-28 RX ADMIN — ALPRAZOLAM 0.5 MG: 0.5 TABLET ORAL at 08:30

## 2017-12-28 RX ADMIN — IPRATROPIUM BROMIDE AND ALBUTEROL SULFATE 3 ML: .5; 3 SOLUTION RESPIRATORY (INHALATION) at 12:39

## 2017-12-28 RX ADMIN — OXYBUTYNIN CHLORIDE 5 MG: 5 TABLET ORAL at 06:19

## 2017-12-28 RX ADMIN — CARVEDILOL 3.12 MG: 3.12 TABLET, FILM COATED ORAL at 20:52

## 2017-12-28 RX ADMIN — GUAIFENESIN 1200 MG: 600 TABLET, EXTENDED RELEASE ORAL at 20:52

## 2017-12-28 RX ADMIN — HYDROMORPHONE HYDROCHLORIDE 0.5 MG: 1 INJECTION, SOLUTION INTRAMUSCULAR; INTRAVENOUS; SUBCUTANEOUS at 14:58

## 2017-12-28 RX ADMIN — BUDESONIDE 0.5 MG: 0.5 INHALANT RESPIRATORY (INHALATION) at 18:54

## 2017-12-28 RX ADMIN — HYDROMORPHONE HYDROCHLORIDE 0.5 MG: 1 INJECTION, SOLUTION INTRAMUSCULAR; INTRAVENOUS; SUBCUTANEOUS at 18:18

## 2017-12-28 RX ADMIN — LOSARTAN POTASSIUM 50 MG: 50 TABLET, FILM COATED ORAL at 08:38

## 2017-12-28 RX ADMIN — HYDROMORPHONE HYDROCHLORIDE 0.5 MG: 1 INJECTION, SOLUTION INTRAMUSCULAR; INTRAVENOUS; SUBCUTANEOUS at 08:40

## 2017-12-28 RX ADMIN — SPIRONOLACTONE 12.5 MG: 25 TABLET ORAL at 08:38

## 2017-12-28 RX ADMIN — HYDROMORPHONE HYDROCHLORIDE 0.5 MG: 1 INJECTION, SOLUTION INTRAMUSCULAR; INTRAVENOUS; SUBCUTANEOUS at 20:52

## 2017-12-28 RX ADMIN — CEFTAZIDIME 2 G: 2 INJECTION, POWDER, FOR SOLUTION INTRAVENOUS at 03:48

## 2017-12-28 RX ADMIN — IPRATROPIUM BROMIDE AND ALBUTEROL SULFATE 3 ML: .5; 3 SOLUTION RESPIRATORY (INHALATION) at 18:54

## 2017-12-28 RX ADMIN — TAMSULOSIN HYDROCHLORIDE 0.4 MG: 0.4 CAPSULE ORAL at 20:52

## 2017-12-28 RX ADMIN — IPRATROPIUM BROMIDE AND ALBUTEROL SULFATE 3 ML: .5; 3 SOLUTION RESPIRATORY (INHALATION) at 07:06

## 2017-12-28 RX ADMIN — IPRATROPIUM BROMIDE AND ALBUTEROL SULFATE 3 ML: .5; 3 SOLUTION RESPIRATORY (INHALATION) at 16:15

## 2017-12-28 RX ADMIN — METHYLPREDNISOLONE SODIUM SUCCINATE 80 MG: 125 INJECTION, POWDER, FOR SOLUTION INTRAMUSCULAR; INTRAVENOUS at 18:18

## 2017-12-29 ENCOUNTER — APPOINTMENT (OUTPATIENT)
Dept: CT IMAGING | Facility: HOSPITAL | Age: 67
End: 2017-12-29
Attending: INTERNAL MEDICINE

## 2017-12-29 LAB
ANION GAP SERPL CALCULATED.3IONS-SCNC: 7.8 MMOL/L
ARTERIAL PATENCY WRIST A: POSITIVE
BACTERIA SPEC AEROBE CULT: NORMAL
BASE EXCESS BLDA CALC-SCNC: 11.5 MMOL/L
BDY SITE: ABNORMAL
BUN BLD-MCNC: 63 MG/DL (ref 7–20)
BUN/CREAT SERPL: 57.3 (ref 6.3–21.9)
CALCIUM SPEC-SCNC: 9.7 MG/DL (ref 8.4–10.2)
CHLORIDE SERPL-SCNC: 103 MMOL/L (ref 98–107)
CO2 SERPL-SCNC: 38 MMOL/L (ref 26–30)
COHGB MFR BLD: 1.3 %
CREAT BLD-MCNC: 1.1 MG/DL (ref 0.6–1.3)
DEPRECATED RDW RBC AUTO: 44.5 FL (ref 37–54)
ERYTHROCYTE [DISTWIDTH] IN BLOOD BY AUTOMATED COUNT: 12.8 % (ref 11.5–14.5)
GFR SERPL CREATININE-BSD FRML MDRD: 67 ML/MIN/1.73
GLUCOSE BLD-MCNC: 117 MG/DL (ref 74–98)
GLUCOSE BLDC GLUCOMTR-MCNC: 115 MG/DL (ref 70–130)
GLUCOSE BLDC GLUCOMTR-MCNC: 118 MG/DL (ref 70–130)
GLUCOSE BLDC GLUCOMTR-MCNC: 119 MG/DL (ref 70–130)
HCO3 BLDA-SCNC: 36.9 MMOL/L (ref 22–28)
HCT VFR BLD AUTO: 40.9 % (ref 42–52)
HGB BLD-MCNC: 12.7 G/DL (ref 14–18)
HGB BLDA-MCNC: 12 G/DL (ref 12–18)
HOROWITZ INDEX BLD+IHG-RTO: 40 %
MCH RBC QN AUTO: 29.7 PG (ref 27–31)
MCHC RBC AUTO-ENTMCNC: 31.1 G/DL (ref 30–37)
MCV RBC AUTO: 95.8 FL (ref 80–94)
METHGB BLD QL: 0.8 %
MODALITY: ABNORMAL
OXYHGB MFR BLDV: 91.7 % (ref 94–99)
PCO2 BLDA: 65.5 MM HG (ref 35–45)
PH BLDA: 7.36 PH UNITS (ref 7.3–7.5)
PLATELET # BLD AUTO: 219 10*3/MM3 (ref 130–400)
PMV BLD AUTO: 9.3 FL (ref 6–12)
PO2 BLDA: 66.8 MM HG (ref 75–100)
POTASSIUM BLD-SCNC: 4.8 MMOL/L (ref 3.5–5.1)
RBC # BLD AUTO: 4.27 10*6/MM3 (ref 4.7–6.1)
SAO2 % BLDCOA: 93.7 %
SODIUM BLD-SCNC: 144 MMOL/L (ref 137–145)
WBC NRBC COR # BLD: 11.45 10*3/MM3 (ref 4.8–10.8)

## 2017-12-29 PROCEDURE — 82962 GLUCOSE BLOOD TEST: CPT

## 2017-12-29 PROCEDURE — 80048 BASIC METABOLIC PNL TOTAL CA: CPT | Performed by: INTERNAL MEDICINE

## 2017-12-29 PROCEDURE — 82375 ASSAY CARBOXYHB QUANT: CPT

## 2017-12-29 PROCEDURE — 25010000002 HYDROMORPHONE PER 4 MG: Performed by: FAMILY MEDICINE

## 2017-12-29 PROCEDURE — 25010000002 LORAZEPAM PER 2 MG: Performed by: INTERNAL MEDICINE

## 2017-12-29 PROCEDURE — 25010000002 LORAZEPAM PER 2 MG: Performed by: FAMILY MEDICINE

## 2017-12-29 PROCEDURE — 94660 CPAP INITIATION&MGMT: CPT

## 2017-12-29 PROCEDURE — 25010000002 ENOXAPARIN PER 10 MG: Performed by: INTERNAL MEDICINE

## 2017-12-29 PROCEDURE — 25010000002 VANCOMYCIN PER 500 MG: Performed by: INTERNAL MEDICINE

## 2017-12-29 PROCEDURE — 92610 EVALUATE SWALLOWING FUNCTION: CPT

## 2017-12-29 PROCEDURE — 85027 COMPLETE CBC AUTOMATED: CPT | Performed by: INTERNAL MEDICINE

## 2017-12-29 PROCEDURE — 25010000002 METHYLPREDNISOLONE PER 40 MG: Performed by: INTERNAL MEDICINE

## 2017-12-29 PROCEDURE — 82805 BLOOD GASES W/O2 SATURATION: CPT

## 2017-12-29 PROCEDURE — 0 IOPAMIDOL 61 % SOLUTION: Performed by: INTERNAL MEDICINE

## 2017-12-29 PROCEDURE — 94799 UNLISTED PULMONARY SVC/PX: CPT

## 2017-12-29 PROCEDURE — 36600 WITHDRAWAL OF ARTERIAL BLOOD: CPT

## 2017-12-29 PROCEDURE — 99233 SBSQ HOSP IP/OBS HIGH 50: CPT | Performed by: INTERNAL MEDICINE

## 2017-12-29 PROCEDURE — 71275 CT ANGIOGRAPHY CHEST: CPT

## 2017-12-29 PROCEDURE — 25010000002 METHYLPREDNISOLONE PER 125 MG: Performed by: INTERNAL MEDICINE

## 2017-12-29 PROCEDURE — 83050 HGB METHEMOGLOBIN QUAN: CPT

## 2017-12-29 RX ORDER — FAMOTIDINE 10 MG/ML
20 INJECTION, SOLUTION INTRAVENOUS EVERY 12 HOURS SCHEDULED
Status: DISCONTINUED | OUTPATIENT
Start: 2017-12-29 | End: 2018-01-03

## 2017-12-29 RX ORDER — METHYLPREDNISOLONE SODIUM SUCCINATE 40 MG/ML
40 INJECTION, POWDER, LYOPHILIZED, FOR SOLUTION INTRAMUSCULAR; INTRAVENOUS EVERY 8 HOURS
Status: DISCONTINUED | OUTPATIENT
Start: 2017-12-29 | End: 2018-01-02

## 2017-12-29 RX ORDER — LORAZEPAM 2 MG/ML
0.5 INJECTION INTRAMUSCULAR EVERY 4 HOURS PRN
Status: DISCONTINUED | OUTPATIENT
Start: 2017-12-29 | End: 2017-12-30

## 2017-12-29 RX ORDER — HYDRALAZINE HYDROCHLORIDE 20 MG/ML
20 INJECTION INTRAMUSCULAR; INTRAVENOUS EVERY 6 HOURS PRN
Status: DISCONTINUED | OUTPATIENT
Start: 2017-12-29 | End: 2018-01-11 | Stop reason: HOSPADM

## 2017-12-29 RX ORDER — LORAZEPAM 2 MG/ML
0.5 INJECTION INTRAMUSCULAR EVERY 4 HOURS
Status: DISCONTINUED | OUTPATIENT
Start: 2017-12-29 | End: 2017-12-29

## 2017-12-29 RX ADMIN — HYDROMORPHONE HYDROCHLORIDE 0.5 MG: 1 INJECTION, SOLUTION INTRAMUSCULAR; INTRAVENOUS; SUBCUTANEOUS at 20:15

## 2017-12-29 RX ADMIN — LORAZEPAM 0.5 MG: 2 INJECTION INTRAMUSCULAR; INTRAVENOUS at 14:25

## 2017-12-29 RX ADMIN — CEFTAZIDIME 2 G: 2 INJECTION, POWDER, FOR SOLUTION INTRAVENOUS at 20:09

## 2017-12-29 RX ADMIN — IPRATROPIUM BROMIDE AND ALBUTEROL SULFATE 3 ML: .5; 3 SOLUTION RESPIRATORY (INHALATION) at 03:17

## 2017-12-29 RX ADMIN — BUDESONIDE 0.5 MG: 0.5 INHALANT RESPIRATORY (INHALATION) at 07:36

## 2017-12-29 RX ADMIN — ENOXAPARIN SODIUM 40 MG: 40 INJECTION SUBCUTANEOUS at 21:06

## 2017-12-29 RX ADMIN — METHYLPREDNISOLONE SODIUM SUCCINATE 80 MG: 125 INJECTION, POWDER, FOR SOLUTION INTRAMUSCULAR; INTRAVENOUS at 00:08

## 2017-12-29 RX ADMIN — IPRATROPIUM BROMIDE AND ALBUTEROL SULFATE 3 ML: .5; 3 SOLUTION RESPIRATORY (INHALATION) at 12:34

## 2017-12-29 RX ADMIN — HYDROMORPHONE HYDROCHLORIDE 0.5 MG: 1 INJECTION, SOLUTION INTRAMUSCULAR; INTRAVENOUS; SUBCUTANEOUS at 02:36

## 2017-12-29 RX ADMIN — CEFTAZIDIME 2 G: 2 INJECTION, POWDER, FOR SOLUTION INTRAVENOUS at 04:21

## 2017-12-29 RX ADMIN — LORAZEPAM 0.5 MG: 2 INJECTION, SOLUTION INTRAMUSCULAR; INTRAVENOUS at 06:15

## 2017-12-29 RX ADMIN — VANCOMYCIN HYDROCHLORIDE 1000 MG: 1 INJECTION, POWDER, LYOPHILIZED, FOR SOLUTION INTRAVENOUS at 22:27

## 2017-12-29 RX ADMIN — FAMOTIDINE 20 MG: 10 INJECTION, SOLUTION INTRAVENOUS at 14:20

## 2017-12-29 RX ADMIN — HYDROMORPHONE HYDROCHLORIDE 0.5 MG: 1 INJECTION, SOLUTION INTRAMUSCULAR; INTRAVENOUS; SUBCUTANEOUS at 14:25

## 2017-12-29 RX ADMIN — BACLOFEN 10 MG: 10 TABLET ORAL at 09:09

## 2017-12-29 RX ADMIN — METHYLPREDNISOLONE SODIUM SUCCINATE 80 MG: 125 INJECTION, POWDER, FOR SOLUTION INTRAMUSCULAR; INTRAVENOUS at 09:09

## 2017-12-29 RX ADMIN — AMLODIPINE BESYLATE 5 MG: 5 TABLET ORAL at 09:22

## 2017-12-29 RX ADMIN — CLOPIDOGREL BISULFATE 75 MG: 75 TABLET ORAL at 09:09

## 2017-12-29 RX ADMIN — IOPAMIDOL 100 ML: 612 INJECTION, SOLUTION INTRAVENOUS at 16:30

## 2017-12-29 RX ADMIN — CEFTAZIDIME 2 G: 2 INJECTION, POWDER, FOR SOLUTION INTRAVENOUS at 12:49

## 2017-12-29 RX ADMIN — LOSARTAN POTASSIUM 50 MG: 50 TABLET, FILM COATED ORAL at 09:08

## 2017-12-29 RX ADMIN — LORAZEPAM 0.5 MG: 2 INJECTION INTRAMUSCULAR; INTRAVENOUS at 20:09

## 2017-12-29 RX ADMIN — ALPRAZOLAM 0.5 MG: 0.5 TABLET ORAL at 09:07

## 2017-12-29 RX ADMIN — FINASTERIDE 5 MG: 5 TABLET, FILM COATED ORAL at 09:09

## 2017-12-29 RX ADMIN — FLUTICASONE PROPIONATE 1 SPRAY: 50 SPRAY, METERED NASAL at 09:10

## 2017-12-29 RX ADMIN — IPRATROPIUM BROMIDE AND ALBUTEROL SULFATE 3 ML: .5; 3 SOLUTION RESPIRATORY (INHALATION) at 07:36

## 2017-12-29 RX ADMIN — BUDESONIDE 0.5 MG: 0.5 INHALANT RESPIRATORY (INHALATION) at 19:25

## 2017-12-29 RX ADMIN — IPRATROPIUM BROMIDE AND ALBUTEROL SULFATE 3 ML: .5; 3 SOLUTION RESPIRATORY (INHALATION) at 19:25

## 2017-12-29 RX ADMIN — ESCITALOPRAM OXALATE 10 MG: 10 TABLET ORAL at 09:09

## 2017-12-29 RX ADMIN — CARVEDILOL 3.12 MG: 3.12 TABLET, FILM COATED ORAL at 20:08

## 2017-12-29 RX ADMIN — LORAZEPAM 0.5 MG: 2 INJECTION, SOLUTION INTRAMUSCULAR; INTRAVENOUS at 00:08

## 2017-12-29 RX ADMIN — VANCOMYCIN HYDROCHLORIDE 1000 MG: 1 INJECTION, POWDER, LYOPHILIZED, FOR SOLUTION INTRAVENOUS at 02:55

## 2017-12-29 RX ADMIN — CARVEDILOL 3.12 MG: 3.12 TABLET, FILM COATED ORAL at 09:09

## 2017-12-29 RX ADMIN — FAMOTIDINE 20 MG: 10 INJECTION, SOLUTION INTRAVENOUS at 20:07

## 2017-12-29 RX ADMIN — IPRATROPIUM BROMIDE AND ALBUTEROL SULFATE 3 ML: .5; 3 SOLUTION RESPIRATORY (INHALATION) at 23:32

## 2017-12-29 RX ADMIN — HYDROMORPHONE HYDROCHLORIDE 0.5 MG: 1 INJECTION, SOLUTION INTRAMUSCULAR; INTRAVENOUS; SUBCUTANEOUS at 09:10

## 2017-12-29 RX ADMIN — METHYLPREDNISOLONE SODIUM SUCCINATE 40 MG: 125 INJECTION, POWDER, FOR SOLUTION INTRAMUSCULAR; INTRAVENOUS at 16:55

## 2017-12-29 RX ADMIN — IPRATROPIUM BROMIDE AND ALBUTEROL SULFATE 3 ML: .5; 3 SOLUTION RESPIRATORY (INHALATION) at 16:14

## 2017-12-29 RX ADMIN — HYDROMORPHONE HYDROCHLORIDE 0.5 MG: 1 INJECTION, SOLUTION INTRAMUSCULAR; INTRAVENOUS; SUBCUTANEOUS at 05:30

## 2017-12-30 ENCOUNTER — APPOINTMENT (OUTPATIENT)
Dept: CT IMAGING | Facility: HOSPITAL | Age: 67
End: 2017-12-30

## 2017-12-30 LAB
ALBUMIN SERPL-MCNC: 3.2 G/DL (ref 3.5–5)
ANION GAP SERPL CALCULATED.3IONS-SCNC: 7.7 MMOL/L
BASOPHILS # BLD AUTO: 0.01 10*3/MM3 (ref 0–0.2)
BASOPHILS NFR BLD AUTO: 0.1 % (ref 0–2.5)
BUN BLD-MCNC: 58 MG/DL (ref 7–20)
BUN/CREAT SERPL: 64.4 (ref 6.3–21.9)
CALCIUM SPEC-SCNC: 8.9 MG/DL (ref 8.4–10.2)
CHLORIDE SERPL-SCNC: 101 MMOL/L (ref 98–107)
CLUMPED PLATELETS: PRESENT
CO2 SERPL-SCNC: 35 MMOL/L (ref 26–30)
CREAT BLD-MCNC: 0.9 MG/DL (ref 0.6–1.3)
DEPRECATED RDW RBC AUTO: 40.4 FL (ref 37–54)
EOSINOPHIL # BLD AUTO: 0.01 10*3/MM3 (ref 0–0.7)
EOSINOPHIL NFR BLD AUTO: 0.1 % (ref 0–7)
ERYTHROCYTE [DISTWIDTH] IN BLOOD BY AUTOMATED COUNT: 12.1 % (ref 11.5–14.5)
GFR SERPL CREATININE-BSD FRML MDRD: 84 ML/MIN/1.73
GLUCOSE BLD-MCNC: 95 MG/DL (ref 74–98)
HCT VFR BLD AUTO: 36.3 % (ref 42–52)
HGB BLD-MCNC: 11.9 G/DL (ref 14–18)
IMM GRANULOCYTES # BLD: 0.07 10*3/MM3 (ref 0–0.06)
IMM GRANULOCYTES NFR BLD: 0.8 % (ref 0–0.6)
LARGE PLATELETS: NORMAL
LYMPHOCYTES # BLD AUTO: 0.39 10*3/MM3 (ref 0.6–3.4)
LYMPHOCYTES NFR BLD AUTO: 4.3 % (ref 10–50)
MCH RBC QN AUTO: 29.8 PG (ref 27–31)
MCHC RBC AUTO-ENTMCNC: 32.8 G/DL (ref 30–37)
MCV RBC AUTO: 91 FL (ref 80–94)
MONOCYTES # BLD AUTO: 0.57 10*3/MM3 (ref 0–0.9)
MONOCYTES NFR BLD AUTO: 6.3 % (ref 0–12)
NEUTROPHILS # BLD AUTO: 7.99 10*3/MM3 (ref 2–6.9)
NEUTROPHILS NFR BLD AUTO: 88.4 % (ref 37–80)
NRBC BLD MANUAL-RTO: 0 /100 WBC (ref 0–0)
PHOSPHATE SERPL-MCNC: 2.7 MG/DL (ref 2.5–4.5)
PLATELET # BLD AUTO: ABNORMAL 10*3/MM3 (ref 130–400)
PMV BLD AUTO: 9.5 FL (ref 6–12)
POTASSIUM BLD-SCNC: 4.7 MMOL/L (ref 3.5–5.1)
RBC # BLD AUTO: 3.99 10*6/MM3 (ref 4.7–6.1)
RBC MORPH BLD: NORMAL
SMALL PLATELETS BLD QL SMEAR: ADEQUATE
SODIUM BLD-SCNC: 139 MMOL/L (ref 137–145)
VANCOMYCIN TROUGH SERPL-MCNC: 10.73 MCG/ML (ref 5–15)
WBC MORPH BLD: NORMAL
WBC NRBC COR # BLD: 9.04 10*3/MM3 (ref 4.8–10.8)

## 2017-12-30 PROCEDURE — 85007 BL SMEAR W/DIFF WBC COUNT: CPT | Performed by: INTERNAL MEDICINE

## 2017-12-30 PROCEDURE — 25010000002 LORAZEPAM PER 2 MG: Performed by: INTERNAL MEDICINE

## 2017-12-30 PROCEDURE — 25010000002 METHYLPREDNISOLONE PER 40 MG: Performed by: INTERNAL MEDICINE

## 2017-12-30 PROCEDURE — 85025 COMPLETE CBC W/AUTO DIFF WBC: CPT | Performed by: INTERNAL MEDICINE

## 2017-12-30 PROCEDURE — 25010000002 HYDROMORPHONE PER 4 MG: Performed by: INTERNAL MEDICINE

## 2017-12-30 PROCEDURE — 25010000002 HYDROMORPHONE PER 4 MG: Performed by: FAMILY MEDICINE

## 2017-12-30 PROCEDURE — 94799 UNLISTED PULMONARY SVC/PX: CPT

## 2017-12-30 PROCEDURE — 80202 ASSAY OF VANCOMYCIN: CPT | Performed by: INTERNAL MEDICINE

## 2017-12-30 PROCEDURE — 25010000002 LEVOFLOXACIN PER 250 MG: Performed by: INTERNAL MEDICINE

## 2017-12-30 PROCEDURE — 25010000002 VANCOMYCIN PER 500 MG: Performed by: INTERNAL MEDICINE

## 2017-12-30 PROCEDURE — 70450 CT HEAD/BRAIN W/O DYE: CPT

## 2017-12-30 PROCEDURE — 25010000002 METHYLPREDNISOLONE PER 125 MG: Performed by: INTERNAL MEDICINE

## 2017-12-30 PROCEDURE — 80069 RENAL FUNCTION PANEL: CPT | Performed by: INTERNAL MEDICINE

## 2017-12-30 PROCEDURE — 25010000002 ENOXAPARIN PER 10 MG: Performed by: INTERNAL MEDICINE

## 2017-12-30 PROCEDURE — 99233 SBSQ HOSP IP/OBS HIGH 50: CPT | Performed by: INTERNAL MEDICINE

## 2017-12-30 RX ORDER — LEVOFLOXACIN 5 MG/ML
750 INJECTION, SOLUTION INTRAVENOUS EVERY 24 HOURS
Status: COMPLETED | OUTPATIENT
Start: 2017-12-30 | End: 2018-01-05

## 2017-12-30 RX ORDER — DULOXETIN HYDROCHLORIDE 30 MG/1
30 CAPSULE, DELAYED RELEASE ORAL DAILY
Status: DISCONTINUED | OUTPATIENT
Start: 2017-12-30 | End: 2018-01-11 | Stop reason: HOSPADM

## 2017-12-30 RX ORDER — GUAIFENESIN AND DEXTROMETHORPHAN HYDROBROMIDE 600; 30 MG/1; MG/1
2 TABLET, EXTENDED RELEASE ORAL 2 TIMES DAILY
Status: DISCONTINUED | OUTPATIENT
Start: 2017-12-30 | End: 2018-01-11 | Stop reason: HOSPADM

## 2017-12-30 RX ORDER — LORAZEPAM 2 MG/ML
0.5 INJECTION INTRAMUSCULAR EVERY 6 HOURS PRN
Status: DISCONTINUED | OUTPATIENT
Start: 2017-12-30 | End: 2018-01-11 | Stop reason: HOSPADM

## 2017-12-30 RX ADMIN — HYDROMORPHONE HYDROCHLORIDE 0.5 MG: 1 INJECTION, SOLUTION INTRAMUSCULAR; INTRAVENOUS; SUBCUTANEOUS at 00:17

## 2017-12-30 RX ADMIN — CARVEDILOL 3.12 MG: 3.12 TABLET, FILM COATED ORAL at 20:27

## 2017-12-30 RX ADMIN — LORAZEPAM 0.5 MG: 2 INJECTION INTRAMUSCULAR; INTRAVENOUS at 12:35

## 2017-12-30 RX ADMIN — IPRATROPIUM BROMIDE AND ALBUTEROL SULFATE 3 ML: .5; 3 SOLUTION RESPIRATORY (INHALATION) at 20:08

## 2017-12-30 RX ADMIN — HYDROMORPHONE HYDROCHLORIDE 0.5 MG: 1 INJECTION, SOLUTION INTRAMUSCULAR; INTRAVENOUS; SUBCUTANEOUS at 20:28

## 2017-12-30 RX ADMIN — FAMOTIDINE 20 MG: 10 INJECTION, SOLUTION INTRAVENOUS at 08:46

## 2017-12-30 RX ADMIN — FLUTICASONE PROPIONATE 1 SPRAY: 50 SPRAY, METERED NASAL at 08:46

## 2017-12-30 RX ADMIN — CEFTAZIDIME 2 G: 2 INJECTION, POWDER, FOR SOLUTION INTRAVENOUS at 20:50

## 2017-12-30 RX ADMIN — LEVOFLOXACIN 750 MG: 5 INJECTION, SOLUTION INTRAVENOUS at 08:49

## 2017-12-30 RX ADMIN — HYDROMORPHONE HYDROCHLORIDE 0.5 MG: 1 INJECTION, SOLUTION INTRAMUSCULAR; INTRAVENOUS; SUBCUTANEOUS at 08:39

## 2017-12-30 RX ADMIN — FAMOTIDINE 20 MG: 10 INJECTION, SOLUTION INTRAVENOUS at 20:27

## 2017-12-30 RX ADMIN — METHYLPREDNISOLONE SODIUM SUCCINATE 40 MG: 125 INJECTION, POWDER, FOR SOLUTION INTRAMUSCULAR; INTRAVENOUS at 00:16

## 2017-12-30 RX ADMIN — BUDESONIDE 0.5 MG: 0.5 INHALANT RESPIRATORY (INHALATION) at 20:08

## 2017-12-30 RX ADMIN — LORAZEPAM 0.5 MG: 2 INJECTION INTRAMUSCULAR; INTRAVENOUS at 04:06

## 2017-12-30 RX ADMIN — TAMSULOSIN HYDROCHLORIDE 0.4 MG: 0.4 CAPSULE ORAL at 20:28

## 2017-12-30 RX ADMIN — CEFTAZIDIME 2 G: 2 INJECTION, POWDER, FOR SOLUTION INTRAVENOUS at 12:55

## 2017-12-30 RX ADMIN — Medication: at 16:08

## 2017-12-30 RX ADMIN — LORAZEPAM 0.5 MG: 2 INJECTION INTRAMUSCULAR; INTRAVENOUS at 00:17

## 2017-12-30 RX ADMIN — HYDROCODONE POLISTIREX AND CHLORPHENIRAMINE POLISTIREX 5 ML: 10; 8 SUSPENSION, EXTENDED RELEASE ORAL at 12:35

## 2017-12-30 RX ADMIN — ENOXAPARIN SODIUM 40 MG: 40 INJECTION SUBCUTANEOUS at 20:28

## 2017-12-30 RX ADMIN — METHYLPREDNISOLONE SODIUM SUCCINATE 40 MG: 125 INJECTION, POWDER, FOR SOLUTION INTRAMUSCULAR; INTRAVENOUS at 08:45

## 2017-12-30 RX ADMIN — METHYLPREDNISOLONE SODIUM SUCCINATE 40 MG: 125 INJECTION, POWDER, FOR SOLUTION INTRAMUSCULAR; INTRAVENOUS at 17:41

## 2017-12-30 RX ADMIN — BUDESONIDE 0.5 MG: 0.5 INHALANT RESPIRATORY (INHALATION) at 06:40

## 2017-12-30 RX ADMIN — LORAZEPAM 0.5 MG: 2 INJECTION INTRAMUSCULAR; INTRAVENOUS at 22:26

## 2017-12-30 RX ADMIN — CEFTAZIDIME 2 G: 2 INJECTION, POWDER, FOR SOLUTION INTRAVENOUS at 04:02

## 2017-12-30 RX ADMIN — GUAIFENESIN AND DEXTROMETHORPHAN HYDROBROMIDE 2 TABLET: 600; 30 TABLET, EXTENDED RELEASE ORAL at 20:27

## 2017-12-30 RX ADMIN — IPRATROPIUM BROMIDE AND ALBUTEROL SULFATE 3 ML: .5; 3 SOLUTION RESPIRATORY (INHALATION) at 16:18

## 2017-12-30 RX ADMIN — HYDROMORPHONE HYDROCHLORIDE 0.5 MG: 1 INJECTION, SOLUTION INTRAMUSCULAR; INTRAVENOUS; SUBCUTANEOUS at 04:06

## 2017-12-30 RX ADMIN — IPRATROPIUM BROMIDE AND ALBUTEROL SULFATE 3 ML: .5; 3 SOLUTION RESPIRATORY (INHALATION) at 06:40

## 2017-12-30 RX ADMIN — DULOXETINE HYDROCHLORIDE 30 MG: 30 CAPSULE, DELAYED RELEASE ORAL at 20:27

## 2017-12-30 RX ADMIN — HYDROMORPHONE HYDROCHLORIDE 0.5 MG: 1 INJECTION, SOLUTION INTRAMUSCULAR; INTRAVENOUS; SUBCUTANEOUS at 16:57

## 2017-12-30 RX ADMIN — VANCOMYCIN HYDROCHLORIDE 1250 MG: 500 INJECTION, POWDER, LYOPHILIZED, FOR SOLUTION INTRAVENOUS at 18:23

## 2017-12-30 RX ADMIN — IPRATROPIUM BROMIDE AND ALBUTEROL SULFATE 3 ML: .5; 3 SOLUTION RESPIRATORY (INHALATION) at 11:35

## 2017-12-30 RX ADMIN — LORAZEPAM 0.5 MG: 2 INJECTION INTRAMUSCULAR; INTRAVENOUS at 08:39

## 2017-12-31 LAB
ALBUMIN SERPL-MCNC: 3.4 G/DL (ref 3.5–5)
ANION GAP SERPL CALCULATED.3IONS-SCNC: 9.1 MMOL/L
ARTERIAL PATENCY WRIST A: POSITIVE
ARTERIAL PATENCY WRIST A: POSITIVE
ATMOSPHERIC PRESS: 742 MMHG
BASE EXCESS BLDA CALC-SCNC: 11.9 MMOL/L
BASE EXCESS BLDA CALC-SCNC: 12.1 MMOL/L
BASOPHILS # BLD AUTO: 0.01 10*3/MM3 (ref 0–0.2)
BASOPHILS NFR BLD AUTO: 0.1 % (ref 0–2.5)
BDY SITE: ABNORMAL
BDY SITE: ABNORMAL
BUN BLD-MCNC: 54 MG/DL (ref 7–20)
BUN/CREAT SERPL: 60 (ref 6.3–21.9)
CALCIUM SPEC-SCNC: 9 MG/DL (ref 8.4–10.2)
CHLORIDE SERPL-SCNC: 100 MMOL/L (ref 98–107)
CO2 SERPL-SCNC: 35 MMOL/L (ref 26–30)
COHGB MFR BLD: 1.4 %
CREAT BLD-MCNC: 0.9 MG/DL (ref 0.6–1.3)
CRP SERPL-MCNC: <0.5 MG/DL (ref 0–1)
DEPRECATED RDW RBC AUTO: 40.2 FL (ref 37–54)
EOSINOPHIL # BLD AUTO: 0 10*3/MM3 (ref 0–0.7)
EOSINOPHIL NFR BLD AUTO: 0 % (ref 0–7)
ERYTHROCYTE [DISTWIDTH] IN BLOOD BY AUTOMATED COUNT: 12.1 % (ref 11.5–14.5)
ERYTHROCYTE [SEDIMENTATION RATE] IN BLOOD: 4 MM/HR (ref 0–15)
GFR SERPL CREATININE-BSD FRML MDRD: 84 ML/MIN/1.73
GLUCOSE BLD-MCNC: 108 MG/DL (ref 74–98)
HCO3 BLDA-SCNC: 36.5 MMOL/L (ref 22–28)
HCO3 BLDA-SCNC: 36.7 MMOL/L (ref 22–28)
HCT VFR BLD AUTO: 38.2 % (ref 42–52)
HGB BLD-MCNC: 12.2 G/DL (ref 14–18)
HGB BLDA-MCNC: 12.5 G/DL (ref 12–18)
HGB BLDA-MCNC: 12.6 G/DL (ref 12–18)
HOROWITZ INDEX BLD+IHG-RTO: 40 %
HOROWITZ INDEX BLD+IHG-RTO: 40 %
IMM GRANULOCYTES # BLD: 0.03 10*3/MM3 (ref 0–0.06)
IMM GRANULOCYTES NFR BLD: 0.3 % (ref 0–0.6)
LYMPHOCYTES # BLD AUTO: 0.39 10*3/MM3 (ref 0.6–3.4)
LYMPHOCYTES NFR BLD AUTO: 3.7 % (ref 10–50)
MAGNESIUM SERPL-MCNC: 2.4 MG/DL (ref 1.6–2.3)
MCH RBC QN AUTO: 29 PG (ref 27–31)
MCHC RBC AUTO-ENTMCNC: 31.9 G/DL (ref 30–37)
MCV RBC AUTO: 91 FL (ref 80–94)
METHGB BLD QL: 0.7 %
MODALITY: ABNORMAL
MODALITY: ABNORMAL
MONOCYTES # BLD AUTO: 0.48 10*3/MM3 (ref 0–0.9)
MONOCYTES NFR BLD AUTO: 4.6 % (ref 0–12)
NEUTROPHILS # BLD AUTO: 9.52 10*3/MM3 (ref 2–6.9)
NEUTROPHILS NFR BLD AUTO: 91.3 % (ref 37–80)
NRBC BLD MANUAL-RTO: 0 /100 WBC (ref 0–0)
OXYHGB MFR BLDV: 93.3 % (ref 94–99)
PCO2 BLDA: 57.1 MM HG (ref 35–45)
PCO2 BLDA: 57.8 MM HG (ref 35–45)
PH BLDA: 7.41 PH UNITS (ref 7.3–7.5)
PH BLDA: 7.41 PH UNITS (ref 7.3–7.5)
PHOSPHATE SERPL-MCNC: 2.9 MG/DL (ref 2.5–4.5)
PLATELET # BLD AUTO: 210 10*3/MM3 (ref 130–400)
PMV BLD AUTO: 9.4 FL (ref 6–12)
PO2 BLDA: 115 MM HG (ref 75–100)
PO2 BLDA: 71.2 MM HG (ref 75–100)
POTASSIUM BLD-SCNC: 5.1 MMOL/L (ref 3.5–5.1)
RBC # BLD AUTO: 4.2 10*6/MM3 (ref 4.7–6.1)
SAO2 % BLDCOA: 95.3 %
SAO2 % BLDCOA: 99 %
SODIUM BLD-SCNC: 139 MMOL/L (ref 137–145)
WBC NRBC COR # BLD: 10.43 10*3/MM3 (ref 4.8–10.8)

## 2017-12-31 PROCEDURE — 85651 RBC SED RATE NONAUTOMATED: CPT | Performed by: INTERNAL MEDICINE

## 2017-12-31 PROCEDURE — 94799 UNLISTED PULMONARY SVC/PX: CPT

## 2017-12-31 PROCEDURE — 94660 CPAP INITIATION&MGMT: CPT

## 2017-12-31 PROCEDURE — 85025 COMPLETE CBC W/AUTO DIFF WBC: CPT | Performed by: INTERNAL MEDICINE

## 2017-12-31 PROCEDURE — 83735 ASSAY OF MAGNESIUM: CPT | Performed by: INTERNAL MEDICINE

## 2017-12-31 PROCEDURE — 99233 SBSQ HOSP IP/OBS HIGH 50: CPT | Performed by: INTERNAL MEDICINE

## 2017-12-31 PROCEDURE — 83050 HGB METHEMOGLOBIN QUAN: CPT

## 2017-12-31 PROCEDURE — 25010000002 LEVOFLOXACIN PER 250 MG: Performed by: INTERNAL MEDICINE

## 2017-12-31 PROCEDURE — 97530 THERAPEUTIC ACTIVITIES: CPT

## 2017-12-31 PROCEDURE — 25010000002 LORAZEPAM PER 2 MG: Performed by: INTERNAL MEDICINE

## 2017-12-31 PROCEDURE — 82805 BLOOD GASES W/O2 SATURATION: CPT

## 2017-12-31 PROCEDURE — 97110 THERAPEUTIC EXERCISES: CPT

## 2017-12-31 PROCEDURE — 82375 ASSAY CARBOXYHB QUANT: CPT

## 2017-12-31 PROCEDURE — 36600 WITHDRAWAL OF ARTERIAL BLOOD: CPT

## 2017-12-31 PROCEDURE — 80069 RENAL FUNCTION PANEL: CPT | Performed by: INTERNAL MEDICINE

## 2017-12-31 PROCEDURE — 25010000002 HYDROMORPHONE PER 4 MG: Performed by: INTERNAL MEDICINE

## 2017-12-31 PROCEDURE — 25010000002 METHYLPREDNISOLONE PER 125 MG: Performed by: INTERNAL MEDICINE

## 2017-12-31 PROCEDURE — 25010000002 METHYLPREDNISOLONE PER 40 MG: Performed by: INTERNAL MEDICINE

## 2017-12-31 PROCEDURE — 25010000002 ENOXAPARIN PER 10 MG: Performed by: INTERNAL MEDICINE

## 2017-12-31 PROCEDURE — 86140 C-REACTIVE PROTEIN: CPT | Performed by: INTERNAL MEDICINE

## 2017-12-31 RX ORDER — WHITE PETROLATUM 450 MG/G
1 STICK TOPICAL 2 TIMES DAILY
Status: DISCONTINUED | OUTPATIENT
Start: 2017-12-31 | End: 2018-01-11 | Stop reason: HOSPADM

## 2017-12-31 RX ORDER — MIRTAZAPINE 15 MG/1
15 TABLET, FILM COATED ORAL NIGHTLY
Status: DISCONTINUED | OUTPATIENT
Start: 2017-12-31 | End: 2018-01-11 | Stop reason: HOSPADM

## 2017-12-31 RX ADMIN — METHYLPREDNISOLONE SODIUM SUCCINATE 40 MG: 125 INJECTION, POWDER, FOR SOLUTION INTRAMUSCULAR; INTRAVENOUS at 00:36

## 2017-12-31 RX ADMIN — BUDESONIDE 0.5 MG: 0.5 INHALANT RESPIRATORY (INHALATION) at 06:53

## 2017-12-31 RX ADMIN — BUDESONIDE 0.5 MG: 0.5 INHALANT RESPIRATORY (INHALATION) at 19:26

## 2017-12-31 RX ADMIN — IPRATROPIUM BROMIDE AND ALBUTEROL SULFATE 3 ML: .5; 3 SOLUTION RESPIRATORY (INHALATION) at 06:53

## 2017-12-31 RX ADMIN — METHYLPREDNISOLONE SODIUM SUCCINATE 40 MG: 125 INJECTION, POWDER, FOR SOLUTION INTRAMUSCULAR; INTRAVENOUS at 09:29

## 2017-12-31 RX ADMIN — HYDROMORPHONE HYDROCHLORIDE 0.5 MG: 1 INJECTION, SOLUTION INTRAMUSCULAR; INTRAVENOUS; SUBCUTANEOUS at 00:28

## 2017-12-31 RX ADMIN — LORAZEPAM 0.5 MG: 2 INJECTION INTRAMUSCULAR; INTRAVENOUS at 05:55

## 2017-12-31 RX ADMIN — HYDROMORPHONE HYDROCHLORIDE 0.5 MG: 1 INJECTION, SOLUTION INTRAMUSCULAR; INTRAVENOUS; SUBCUTANEOUS at 13:00

## 2017-12-31 RX ADMIN — CEFTAZIDIME 2 G: 2 INJECTION, POWDER, FOR SOLUTION INTRAVENOUS at 20:27

## 2017-12-31 RX ADMIN — GUAIFENESIN AND DEXTROMETHORPHAN HYDROBROMIDE 2 TABLET: 600; 30 TABLET, EXTENDED RELEASE ORAL at 09:27

## 2017-12-31 RX ADMIN — CEFTAZIDIME 2 G: 2 INJECTION, POWDER, FOR SOLUTION INTRAVENOUS at 04:16

## 2017-12-31 RX ADMIN — FINASTERIDE 5 MG: 5 TABLET, FILM COATED ORAL at 09:28

## 2017-12-31 RX ADMIN — GUAIFENESIN AND DEXTROMETHORPHAN HYDROBROMIDE 2 TABLET: 600; 30 TABLET, EXTENDED RELEASE ORAL at 20:25

## 2017-12-31 RX ADMIN — ENOXAPARIN SODIUM 40 MG: 40 INJECTION SUBCUTANEOUS at 20:26

## 2017-12-31 RX ADMIN — TRAMADOL HYDROCHLORIDE 50 MG: 50 TABLET, COATED ORAL at 17:04

## 2017-12-31 RX ADMIN — METHYLPREDNISOLONE SODIUM SUCCINATE 40 MG: 125 INJECTION, POWDER, FOR SOLUTION INTRAMUSCULAR; INTRAVENOUS at 15:17

## 2017-12-31 RX ADMIN — TRAMADOL HYDROCHLORIDE 50 MG: 50 TABLET, COATED ORAL at 23:10

## 2017-12-31 RX ADMIN — AMLODIPINE BESYLATE 5 MG: 5 TABLET ORAL at 09:27

## 2017-12-31 RX ADMIN — CARVEDILOL 3.12 MG: 3.12 TABLET, FILM COATED ORAL at 20:25

## 2017-12-31 RX ADMIN — DULOXETINE HYDROCHLORIDE 30 MG: 30 CAPSULE, DELAYED RELEASE ORAL at 09:29

## 2017-12-31 RX ADMIN — LEVOFLOXACIN 750 MG: 5 INJECTION, SOLUTION INTRAVENOUS at 09:26

## 2017-12-31 RX ADMIN — FAMOTIDINE 20 MG: 10 INJECTION, SOLUTION INTRAVENOUS at 09:29

## 2017-12-31 RX ADMIN — CARVEDILOL 3.12 MG: 3.12 TABLET, FILM COATED ORAL at 09:28

## 2017-12-31 RX ADMIN — FAMOTIDINE 20 MG: 10 INJECTION, SOLUTION INTRAVENOUS at 20:25

## 2017-12-31 RX ADMIN — Medication 1 EACH: at 12:13

## 2017-12-31 RX ADMIN — ATORVASTATIN CALCIUM 40 MG: 40 TABLET, FILM COATED ORAL at 09:27

## 2017-12-31 RX ADMIN — CLOPIDOGREL BISULFATE 75 MG: 75 TABLET ORAL at 09:27

## 2017-12-31 RX ADMIN — LORAZEPAM 0.5 MG: 2 INJECTION INTRAMUSCULAR; INTRAVENOUS at 15:17

## 2017-12-31 RX ADMIN — MIRTAZAPINE 15 MG: 15 TABLET, FILM COATED ORAL at 20:25

## 2017-12-31 RX ADMIN — IPRATROPIUM BROMIDE AND ALBUTEROL SULFATE 3 ML: .5; 3 SOLUTION RESPIRATORY (INHALATION) at 04:37

## 2017-12-31 RX ADMIN — TAMSULOSIN HYDROCHLORIDE 0.4 MG: 0.4 CAPSULE ORAL at 20:26

## 2017-12-31 RX ADMIN — METHYLPREDNISOLONE SODIUM SUCCINATE 40 MG: 125 INJECTION, POWDER, FOR SOLUTION INTRAMUSCULAR; INTRAVENOUS at 23:12

## 2017-12-31 RX ADMIN — ESCITALOPRAM OXALATE 10 MG: 10 TABLET ORAL at 09:29

## 2017-12-31 RX ADMIN — IPRATROPIUM BROMIDE AND ALBUTEROL SULFATE 3 ML: .5; 3 SOLUTION RESPIRATORY (INHALATION) at 12:50

## 2017-12-31 RX ADMIN — CEFTAZIDIME 2 G: 2 INJECTION, POWDER, FOR SOLUTION INTRAVENOUS at 12:13

## 2017-12-31 RX ADMIN — LOSARTAN POTASSIUM 50 MG: 50 TABLET, FILM COATED ORAL at 09:28

## 2017-12-31 RX ADMIN — IPRATROPIUM BROMIDE AND ALBUTEROL SULFATE 3 ML: .5; 3 SOLUTION RESPIRATORY (INHALATION) at 16:33

## 2017-12-31 RX ADMIN — IPRATROPIUM BROMIDE AND ALBUTEROL SULFATE 3 ML: .5; 3 SOLUTION RESPIRATORY (INHALATION) at 19:26

## 2017-12-31 RX ADMIN — IPRATROPIUM BROMIDE AND ALBUTEROL SULFATE 3 ML: .5; 3 SOLUTION RESPIRATORY (INHALATION) at 00:13

## 2017-12-31 RX ADMIN — FLUTICASONE PROPIONATE 1 SPRAY: 50 SPRAY, METERED NASAL at 09:29

## 2018-01-01 LAB
ALBUMIN SERPL-MCNC: 3.2 G/DL (ref 3.5–5)
ANION GAP SERPL CALCULATED.3IONS-SCNC: 7.8 MMOL/L
ARTERIAL PATENCY WRIST A: POSITIVE
BASE EXCESS BLDA CALC-SCNC: 9.6 MMOL/L
BDY SITE: ABNORMAL
BUN BLD-MCNC: 49 MG/DL (ref 7–20)
BUN/CREAT SERPL: 49 (ref 6.3–21.9)
CALCIUM SPEC-SCNC: 8.9 MG/DL (ref 8.4–10.2)
CHLORIDE SERPL-SCNC: 99 MMOL/L (ref 98–107)
CO2 SERPL-SCNC: 36 MMOL/L (ref 26–30)
COHGB MFR BLD: 1.2 %
CREAT BLD-MCNC: 1 MG/DL (ref 0.6–1.3)
GFR SERPL CREATININE-BSD FRML MDRD: 75 ML/MIN/1.73
GLUCOSE BLD-MCNC: 123 MG/DL (ref 74–98)
HCO3 BLDA-SCNC: 34.5 MMOL/L (ref 22–28)
HGB BLDA-MCNC: 12.7 G/DL (ref 12–18)
HOROWITZ INDEX BLD+IHG-RTO: 45 %
MAGNESIUM SERPL-MCNC: 2.4 MG/DL (ref 1.6–2.3)
METHGB BLD QL: 0.4 %
MODALITY: ABNORMAL
OXYHGB MFR BLDV: 95.6 % (ref 94–99)
PCO2 BLDA: 57 MM HG (ref 35–45)
PH BLDA: 7.39 PH UNITS (ref 7.3–7.5)
PHOSPHATE SERPL-MCNC: 2.9 MG/DL (ref 2.5–4.5)
PO2 BLDA: 85.7 MM HG (ref 75–100)
POTASSIUM BLD-SCNC: 4.8 MMOL/L (ref 3.5–5.1)
SAO2 % BLDCOA: 97.2 %
SODIUM BLD-SCNC: 138 MMOL/L (ref 137–145)

## 2018-01-01 PROCEDURE — 25010000002 ENOXAPARIN PER 10 MG: Performed by: INTERNAL MEDICINE

## 2018-01-01 PROCEDURE — 82805 BLOOD GASES W/O2 SATURATION: CPT

## 2018-01-01 PROCEDURE — 80069 RENAL FUNCTION PANEL: CPT | Performed by: INTERNAL MEDICINE

## 2018-01-01 PROCEDURE — 99232 SBSQ HOSP IP/OBS MODERATE 35: CPT | Performed by: INTERNAL MEDICINE

## 2018-01-01 PROCEDURE — 94799 UNLISTED PULMONARY SVC/PX: CPT

## 2018-01-01 PROCEDURE — 36600 WITHDRAWAL OF ARTERIAL BLOOD: CPT

## 2018-01-01 PROCEDURE — 94660 CPAP INITIATION&MGMT: CPT

## 2018-01-01 PROCEDURE — 25010000002 METHYLPREDNISOLONE PER 40 MG: Performed by: INTERNAL MEDICINE

## 2018-01-01 PROCEDURE — 25010000002 LEVOFLOXACIN PER 250 MG: Performed by: INTERNAL MEDICINE

## 2018-01-01 PROCEDURE — 82375 ASSAY CARBOXYHB QUANT: CPT

## 2018-01-01 PROCEDURE — 25010000002 LORAZEPAM PER 2 MG: Performed by: INTERNAL MEDICINE

## 2018-01-01 PROCEDURE — 83735 ASSAY OF MAGNESIUM: CPT | Performed by: INTERNAL MEDICINE

## 2018-01-01 PROCEDURE — 83050 HGB METHEMOGLOBIN QUAN: CPT

## 2018-01-01 RX ADMIN — CEFTAZIDIME 2 G: 2 INJECTION, POWDER, FOR SOLUTION INTRAVENOUS at 04:34

## 2018-01-01 RX ADMIN — LORAZEPAM 0.5 MG: 2 INJECTION INTRAMUSCULAR; INTRAVENOUS at 23:40

## 2018-01-01 RX ADMIN — GUAIFENESIN AND DEXTROMETHORPHAN HYDROBROMIDE 2 TABLET: 600; 30 TABLET, EXTENDED RELEASE ORAL at 10:02

## 2018-01-01 RX ADMIN — IPRATROPIUM BROMIDE AND ALBUTEROL SULFATE 3 ML: .5; 3 SOLUTION RESPIRATORY (INHALATION) at 00:51

## 2018-01-01 RX ADMIN — FINASTERIDE 5 MG: 5 TABLET, FILM COATED ORAL at 10:02

## 2018-01-01 RX ADMIN — IPRATROPIUM BROMIDE AND ALBUTEROL SULFATE 3 ML: .5; 3 SOLUTION RESPIRATORY (INHALATION) at 12:32

## 2018-01-01 RX ADMIN — OXYBUTYNIN CHLORIDE 5 MG: 5 TABLET ORAL at 06:13

## 2018-01-01 RX ADMIN — BUDESONIDE 0.5 MG: 0.5 INHALANT RESPIRATORY (INHALATION) at 06:53

## 2018-01-01 RX ADMIN — BUDESONIDE 0.5 MG: 0.5 INHALANT RESPIRATORY (INHALATION) at 20:02

## 2018-01-01 RX ADMIN — LEVOFLOXACIN 750 MG: 5 INJECTION, SOLUTION INTRAVENOUS at 10:00

## 2018-01-01 RX ADMIN — ENOXAPARIN SODIUM 40 MG: 40 INJECTION SUBCUTANEOUS at 21:00

## 2018-01-01 RX ADMIN — POLYETHYLENE GLYCOL 3350 17 G: 17 POWDER, FOR SOLUTION ORAL at 12:26

## 2018-01-01 RX ADMIN — FAMOTIDINE 20 MG: 10 INJECTION, SOLUTION INTRAVENOUS at 20:59

## 2018-01-01 RX ADMIN — TAMSULOSIN HYDROCHLORIDE 0.4 MG: 0.4 CAPSULE ORAL at 21:00

## 2018-01-01 RX ADMIN — IPRATROPIUM BROMIDE AND ALBUTEROL SULFATE 3 ML: .5; 3 SOLUTION RESPIRATORY (INHALATION) at 20:01

## 2018-01-01 RX ADMIN — IPRATROPIUM BROMIDE AND ALBUTEROL SULFATE 3 ML: .5; 3 SOLUTION RESPIRATORY (INHALATION) at 04:23

## 2018-01-01 RX ADMIN — CARVEDILOL 3.12 MG: 3.12 TABLET, FILM COATED ORAL at 10:03

## 2018-01-01 RX ADMIN — METHYLPREDNISOLONE SODIUM SUCCINATE 40 MG: 125 INJECTION, POWDER, FOR SOLUTION INTRAMUSCULAR; INTRAVENOUS at 17:38

## 2018-01-01 RX ADMIN — FLUTICASONE PROPIONATE 1 SPRAY: 50 SPRAY, METERED NASAL at 10:04

## 2018-01-01 RX ADMIN — LOSARTAN POTASSIUM 50 MG: 50 TABLET, FILM COATED ORAL at 10:01

## 2018-01-01 RX ADMIN — LORAZEPAM 0.5 MG: 2 INJECTION INTRAMUSCULAR; INTRAVENOUS at 00:56

## 2018-01-01 RX ADMIN — IPRATROPIUM BROMIDE AND ALBUTEROL SULFATE 3 ML: .5; 3 SOLUTION RESPIRATORY (INHALATION) at 06:53

## 2018-01-01 RX ADMIN — CLOPIDOGREL BISULFATE 75 MG: 75 TABLET ORAL at 10:03

## 2018-01-01 RX ADMIN — DULOXETINE HYDROCHLORIDE 30 MG: 30 CAPSULE, DELAYED RELEASE ORAL at 10:02

## 2018-01-01 RX ADMIN — CEFTAZIDIME 2 G: 2 INJECTION, POWDER, FOR SOLUTION INTRAVENOUS at 21:10

## 2018-01-01 RX ADMIN — FAMOTIDINE 20 MG: 10 INJECTION, SOLUTION INTRAVENOUS at 10:02

## 2018-01-01 RX ADMIN — Medication 1 EACH: at 21:12

## 2018-01-01 RX ADMIN — GUAIFENESIN AND DEXTROMETHORPHAN HYDROBROMIDE 2 TABLET: 600; 30 TABLET, EXTENDED RELEASE ORAL at 20:59

## 2018-01-01 RX ADMIN — MIRTAZAPINE 15 MG: 15 TABLET, FILM COATED ORAL at 21:00

## 2018-01-01 RX ADMIN — TRAMADOL HYDROCHLORIDE 50 MG: 50 TABLET, COATED ORAL at 14:34

## 2018-01-01 RX ADMIN — CEFTAZIDIME 2 G: 2 INJECTION, POWDER, FOR SOLUTION INTRAVENOUS at 12:27

## 2018-01-01 RX ADMIN — METHYLPREDNISOLONE SODIUM SUCCINATE 40 MG: 125 INJECTION, POWDER, FOR SOLUTION INTRAMUSCULAR; INTRAVENOUS at 10:04

## 2018-01-01 RX ADMIN — ATORVASTATIN CALCIUM 40 MG: 40 TABLET, FILM COATED ORAL at 10:03

## 2018-01-01 RX ADMIN — LORAZEPAM 0.5 MG: 2 INJECTION INTRAMUSCULAR; INTRAVENOUS at 18:08

## 2018-01-01 RX ADMIN — AMLODIPINE BESYLATE 5 MG: 5 TABLET ORAL at 10:03

## 2018-01-01 RX ADMIN — CARVEDILOL 3.12 MG: 3.12 TABLET, FILM COATED ORAL at 21:00

## 2018-01-01 NOTE — SIGNIFICANT NOTE
01/01/18 1515   Rehab Treatment   Discipline occupational therapist   Treatment Not Performed patient/family declined treatment  (Pt reports he feels too weak right now.  Just doesnt feel like doing anything.  Will follow up with pt tomorrow.)

## 2018-01-01 NOTE — PLAN OF CARE
Problem: NPPV/CPAP (Adult)  Goal: Signs and Symptoms of Listed Potential Problems Will be Absent or Manageable (NPPV/CPAP)  Outcome: Ongoing (interventions implemented as appropriate)   01/01/18 0120   NPPV/CPAP   Problems Assessed (NPPV/CPAP) hypoxia/hypoxemia;skin breakdown;dry mucous membranes;situational response   Problems Present (NPPV/CPAP) skin breakdown

## 2018-01-01 NOTE — PLAN OF CARE
Problem: Patient Care Overview (Adult)  Goal: Plan of Care Review  Outcome: Ongoing (interventions implemented as appropriate)   01/01/18 1433   Coping/Psychosocial Response Interventions   Plan Of Care Reviewed With patient   Patient Care Overview   Progress improving       Problem: NPPV/CPAP (Adult)  Goal: Signs and Symptoms of Listed Potential Problems Will be Absent or Manageable (NPPV/CPAP)  Outcome: Ongoing (interventions implemented as appropriate)   01/01/18 1433   NPPV/CPAP   Problems Assessed (NPPV/CPAP) all   Problems Present (NPPV/CPAP) none

## 2018-01-01 NOTE — PLAN OF CARE
Problem: Patient Care Overview (Adult)  Goal: Plan of Care Review  Outcome: Ongoing (interventions implemented as appropriate)   01/01/18 1602   Coping/Psychosocial Response Interventions   Plan Of Care Reviewed With patient   Patient Care Overview   Progress no change       Problem: COPD, Chronic Bronchitis/Emphysema (Adult)  Goal: Signs and Symptoms of Listed Potential Problems Will be Absent or Manageable (COPD, Chronic Bronchitis/Emphysema)  Outcome: Ongoing (interventions implemented as appropriate)      Problem: Respiratory Insufficiency (Adult)  Goal: Acid/Base Balance  Outcome: Ongoing (interventions implemented as appropriate)      Problem: NPPV/CPAP (Adult)  Goal: Signs and Symptoms of Listed Potential Problems Will be Absent or Manageable (NPPV/CPAP)  Outcome: Ongoing (interventions implemented as appropriate)

## 2018-01-01 NOTE — PLAN OF CARE
Problem: Patient Care Overview (Adult)  Goal: Plan of Care Review  Outcome: Ongoing (interventions implemented as appropriate)   12/31/17 1532 12/31/17 2000 01/01/18 0126   Coping/Psychosocial Response Interventions   Plan Of Care Reviewed With --  patient;family --    Patient Care Overview   Progress progress toward functional goals is gradual --  --    Outcome Evaluation   Outcome Summary/Follow up Plan --  --  pt is wearing his bipap mask in longer intervals, will continue to monitor patients progress toward improving repsiratiory status.     Goal: Adult Individualization and Mutuality  Outcome: Ongoing (interventions implemented as appropriate)   12/27/17 0508 12/28/17 0351 12/29/17 1052   Individualization   Patient Specific Preferences to wear bipap continuously --  --    Patient Specific Goals --  --  Goals of Care to be discussed when able   Patient Specific Interventions --  --  Spoke with family to plan discussion   Mutuality/Individual Preferences   What Anxieties, Fears or Concerns Do You Have About Your Health or Care? --  Pt is concerned about not getting better-causing him anxiety --      Goal: Discharge Needs Assessment  Outcome: Ongoing (interventions implemented as appropriate)   12/24/17 1940 12/26/17 1118 12/28/17 0351   Discharge Needs Assessment   Concerns To Be Addressed --  --  coping/stress concerns   Readmission Within The Last 30 Days --  --  --    Equipment Needed After Discharge --  --  bipap/ cpap;oxygen   Discharge Facility/Level Of Care Needs --  nursing facility, skilled --    Current Health   Anticipated Changes Related to Illness --  --  --    Living Environment   Transportation Available car --  --    Self-Care   Equipment Currently Used at Home --  --  --     12/28/17 1205 12/30/17 0438   Discharge Needs Assessment   Concerns To Be Addressed --  --    Readmission Within The Last 30 Days --  no previous admission in last 30 days   Equipment Needed After Discharge --  --     Discharge Facility/Level Of Care Needs --  --    Current Health   Anticipated Changes Related to Illness --  inability to care for self   Living Environment   Transportation Available --  --    Self-Care   Equipment Currently Used at Home rollator;oxygen;commode;shower chair --        Problem: COPD, Chronic Bronchitis/Emphysema (Adult)  Intervention: Match Activity with Ability/Tolerance   01/01/18 0000   Activity   Activity Type activity adjusted per tolerance     Intervention: Optimize Oxygenation/Ventilation/Perfusion   12/31/17 2000 01/01/18 0126   Respiratory Interventions   Airway/Ventilation Management --  airway patency maintained;calming measures promoted   Promote Aggressive Pulmonary Hygiene/Secretion Management   Cough And Deep Breathing done with encouragement --      Intervention: Support/Optimize Psychosocial Response to Chronic Pulmonary Disease   12/31/17 1600 12/31/17 2000   Coping Strategies   Family/Support System Care --  support provided   Trust Relationship/Rapport --  care explained;choices provided;emotional support provided;empathic listening provided;questions answered;questions encouraged;reassurance provided;thoughts/feelings acknowledged   Supportive Measures --  active listening utilized;decision-making supported;goal setting facilitated;relaxation techniques promoted;self-care encouraged   Coping/Psychosocial Interventions   Life Transition/Adjustment decision-making facilitated --      Intervention: Reduce/Relieve Breathlessness   12/31/17 1600 01/01/18 0126   Positioning   Head Of Bed (HOB) Position --  HOB elevated;HOB at 30-45 degrees   Cognitive Interventions   Sensory Stimulation Regulation care clustered;quiet environment promoted --      Intervention: Optimize Hydration/Nutrition   12/31/17 2000   Nutrition Interventions   Oral Nutrition Promotion rest periods promoted       Goal: Signs and Symptoms of Listed Potential Problems Will be Absent or Manageable (COPD, Chronic  Bronchitis/Emphysema)   12/30/17 1831   COPD, Chronic Bronchitis/Emphysema   Problems Assessed (COPD, Chronic Bronchitis/Emphysema) all   Problems Present (COPD, Chronic Bronchitis/Emphysema) dyspnea;hypoxia/hypoxemia       Problem: Respiratory Insufficiency (Adult)  Intervention: Provide Oxygenation/Ventilation/Perfusion Support   01/01/18 0000 01/01/18 0126   Positioning   Head Of Bed (HOB) Position --  HOB elevated;HOB at 30-45 degrees   Respiratory Interventions   Airway/Ventilation Management --  airway patency maintained;calming measures promoted   Activity   Activity Type activity adjusted per tolerance --      Intervention: Optimize/Manage Energy Expenditure   12/31/17 1600 12/31/17 2000   Coping/Psychosocial Interventions   Environmental Support calm environment promoted --    Nutrition Interventions   Oral Nutrition Promotion --  rest periods promoted   Pain/Comfort/Sleep Interventions   Sleep/Rest Enhancement --  regular sleep/rest pattern promoted       Goal: Acid/Base Balance  Outcome: Ongoing (interventions implemented as appropriate)   01/01/18 0126   Respiratory Insufficiency (Adult)   Acid/Base Balance making progress toward outcome     Goal: Effective Ventilation  Outcome: Ongoing (interventions implemented as appropriate)   01/01/18 0126   Respiratory Insufficiency (Adult)   Effective Ventilation making progress toward outcome       Problem: NPPV/CPAP (Adult)  Intervention: Monitor/Manage Anxiety Related to NPPV/CPAP   12/31/17 2000   Coping Strategies   Trust Relationship/Rapport care explained;choices provided;emotional support provided;empathic listening provided;questions answered;questions encouraged;reassurance provided;thoughts/feelings acknowledged   Safety Interventions   Medication Review/Management medications reviewed     Intervention: Prevent Aspiration During Therapy   01/01/18 0126   Positioning   Head Of Bed (HOB) Position HOB elevated;HOB at 30-45 degrees     Intervention:  Assess/Manage Patient Tolerance of Noninvasive Ventilation   01/01/18 0126   Respiratory Interventions   Airway/Ventilation Management airway patency maintained;calming measures promoted     Intervention: Prevent/Minimize Device Friction/Shearing and Pressure Points   01/01/18 0126   Respiratory Interventions   NPPV/CPAP Maintenance mask adjusted;nasal pillows adjusted;nasal prongs adjusted;tubes secured       Goal: Signs and Symptoms of Listed Potential Problems Will be Absent or Manageable (NPPV/CPAP)  Outcome: Ongoing (interventions implemented as appropriate)   01/01/18 0120   NPPV/CPAP   Problems Assessed (NPPV/CPAP) hypoxia/hypoxemia;skin breakdown;dry mucous membranes;situational response   Problems Present (NPPV/CPAP) skin breakdown

## 2018-01-01 NOTE — SIGNIFICANT NOTE
01/01/18 1515   PT Deferred Reason   PT Deferred Reason Patient/family declined treatment  (Patient states he is not feeling up to working with therapy today.  Nursing informed and they report that they recently completed bathing and repositioning.  PT will attempt again tomorrow.)

## 2018-01-01 NOTE — PROGRESS NOTES
[facility]    Name:  Thurman Mendel Parsons   Age:  67 y.o.  Sex:  male  :  1950  MRN:  [id number]   Visit Number:  [id number]  Admission Date:  2017  Date Of Service:  18  Primary Care Physician:  Miguel Rojas MD     LOS: 8 days :  Patient Care Team:  Miguel Rojas MD as PCP - General  Miguel Rojas MD as PCP - Family Medicine:    History taken from:     patient    Chief Complaint:      Dyspnea    Subjective     Interval History:     Patient seen today.  Patient is a 67-year-old male with history of chronic respiratory failure secondary to COPD, PEDRO, hypertension.  He has been intermittently compliant with his BiPAP at home.  He was admitted with acute on chronic hypoxic hypercapnic respiratory failure.  He also has bilateral pneumonia.  He was initially on vancomycin, Fortaz and Levaquin.  Vancomycin has been discontinued after cultures have been negative.  He was transitioned to prednisone by Dr. Lopez.  He continues with breathing treatments.  He is also on high flow oxygen.  He appears to be improving.  He is not eating much, and is barely working with PT and OT.  First patient to eat more and work with therapy.  Family is interested in rehabilitation when he is ready to be discharged.  He has been there before.  He denies any nausea, vomiting, or pain.  He is constipated.    Review of Systems:     All systems were reviewed and negative except for:  Respiratory: positive for  shortness of air    Objective     Vital Signs:    Temp:  [97.2 °F (36.2 °C)-98.2 °F (36.8 °C)] 98.1 °F (36.7 °C)  Heart Rate:  [64-96] 76  Resp:  [18-23] 19  BP: (113-154)/(60-81) 121/72    Physical Exam:      General Appearance:    Alert, cooperative, in no acute distress   Head:    Normocephalic, without obvious abnormality, atraumatic   Eyes:            Lids and lashes normal, conjunctivae and sclerae normal, no   icterus, no pallor, corneas clear, PERRLA   Ears:     Ears appear intact with no abnormalities noted   Throat:   No oral lesions, no thrush, oral mucosa moist   Neck:   No adenopathy, supple, trachea midline, no thyromegaly, no   carotid bruit, no JVD   Back:     No kyphosis present, no scoliosis present, no skin lesions,      erythema or scars, no tenderness to percussion or                   palpation,   range of motion normal   Lungs:     Rhonchi bilaterally without use of accessory muscles respiration.      Heart:    Regular rhythm and normal rate, normal S1 and S2, no            murmur, no gallop, no rub, no click   Chest Wall:    No abnormalities observed   Abdomen:     Normal bowel sounds, no masses, no organomegaly, soft        non-tender, non-distended, no guarding, no rebound                tenderness   Rectal:     Deferred   Extremities:   Moves all extremities well, no edema, no cyanosis, no             redness   Pulses:   Pulses palpable and equal bilaterally   Skin:   No bleeding, bruising or rash   Lymph nodes:   No palpable adenopathy   Neurologic:   Cranial nerves 2 - 12 grossly intact, sensation intact, DTR       present and equal bilaterally        Results Review:      I reviewed the patient's new clinical results.    Labs:    Lab Results (last 24 hours)     Procedure Component Value Units Date/Time    Blood Gas, Arterial With Co-Ox [[id number]]  (Abnormal) Collected:  01/01/18 0823    Specimen:  Arterial Blood Updated:  01/01/18 0824     Site Arterial: left radial     Mynor's Test Positive     pH, Arterial 7.390 pH units      pCO2, Arterial 57.0 (C) mm Hg      pO2, Arterial 85.7 mm Hg      HCO3, Arterial 34.5 (H) mmol/L      Base Excess, Arterial 9.6 mmol/L      O2 Saturation, Arterial 97.2 %      Hemoglobin, Blood Gas 12.7 g/dL      Oxyhemoglobin 95.6 %      Methemoglobin 0.40 %      Carboxyhemoglobin 1.2 %      Modality Cannula - High Flow     FIO2 45 %     Magnesium [[id number]]  (Abnormal) Collected:  01/01/18 0802    Specimen:  Blood Updated:   01/01/18 0835     Magnesium 2.4 (H) mg/dL     Renal Function Panel [[id number]]  (Abnormal) Collected:  01/01/18 0802    Specimen:  Blood Updated:  01/01/18 0835     Glucose 123 (H) mg/dL      BUN 49 (H) mg/dL      Creatinine 1.00 mg/dL      Sodium 138 mmol/L      Potassium 4.8 mmol/L      Chloride 99 mmol/L      CO2 36.0 (H) mmol/L      Calcium 8.9 mg/dL      Albumin 3.20 (L) g/dL      Phosphorus 2.9 mg/dL      Anion Gap 7.8 mmol/L      BUN/Creatinine Ratio 49.0 (H)     eGFR Non African Amer 75 mL/min/1.73     Narrative:       Abnormal estimated GFR should be followed by more specific studies to confirm end stage chronic renal disease. The equation used for calculation may not be accurate for patients less than 19 years old, greater than 70 years old, patients at extremes of weight, malnutrition, or with acute renal dysfunction.           Radiology:    Imaging Results (last 24 hours)     ** No results found for the last 24 hours. **          Medication Review:       amLODIPine 5 mg Oral Q24H   atorvastatin 40 mg Oral Daily   budesonide 0.5 mg Nebulization BID - RT   carvedilol 3.125 mg Oral Q12H   ceftazidime 2 g Intravenous Q8H   CHAPSTICK 1 each Apply externally BID   clopidogrel 75 mg Oral Daily   DULoxetine 30 mg Oral Daily   enoxaparin 40 mg Subcutaneous Q24H   famotidine 20 mg Intravenous Q12H   finasteride 5 mg Oral Daily   fluticasone 1 spray Nasal Daily   guaifenesin-dextromethorphan 2 tablet Oral BID   ipratropium-albuterol 3 mL Nebulization Q4H - RT   levoFLOXacin 750 mg Intravenous Q24H   losartan 50 mg Oral Daily   methylPREDNISolone sodium succinate 40 mg Intravenous Q8H   mirtazapine 15 mg Oral Nightly   oxybutynin 5 mg Oral QAM   tamsulosin 0.4 mg Oral Nightly            Assessment/Plan     Problem List Items Addressed This Visit     Impaired mobility and ADLs    Acute exacerbation of chronic obstructive pulmonary disease (COPD) - Primary    Relevant Medications    BREO ELLIPTA 200-25 MCG/INH aerosol  powder     PROAIR  (90 BASE) MCG/ACT inhaler    budesonide (PULMICORT) 0.5 MG/2ML nebulizer solution    ipratropium-albuterol (DUO-NEB) 0.5-2.5 mg/mL nebulizer    predniSONE (DELTASONE) 10 MG tablet    benzonatate (TESSALON) 200 MG capsule    HYDROcod Polst-CPM Polst ER (TUSSIONEX PENNKINETIC) 10-8 MG/5ML ER suspension    pseudoephedrine-guaifenesin (MUCINEX D)  MG per 12 hr tablet    fluticasone (FLONASE) 50 MCG/ACT nasal spray    predniSONE (DELTASONE) 10 MG tablet      Other Visit Diagnoses     Acute on chronic respiratory failure with hypercapnia              Acute exacerbation of chronic obstructive pulmonary disease (COPD)  Bilateral pneumonia, present on admission  Elevated troponin, Suspect type II MI  Hypertension  Right bundle-branch block  Obstructive sleep apnea with noncompliance to BiPAP  Acute on chronic hypoxic respiratory failure With hypercapnia    Plan:    Continue with Fortaz and Levaquin, DuoNeb, prednisone, and Pulmicort.  Patient is on Tussionex, Mucinex D, and Tessalon.  Continue with PT and OT.  Encourage oral intake and this patient.  Check lab work in the a.m.  Remove Tinajero catheter in place a condom catheter.  Further recommendations will depend on clinical course.    Joshua Mendez DO  01/01/18  10:43 AM

## 2018-01-02 LAB
ALBUMIN SERPL-MCNC: 2.9 G/DL (ref 3.5–5)
ANION GAP SERPL CALCULATED.3IONS-SCNC: 5.8 MMOL/L
BASOPHILS # BLD AUTO: 0.01 10*3/MM3 (ref 0–0.2)
BASOPHILS NFR BLD AUTO: 0.1 % (ref 0–2.5)
BUN BLD-MCNC: 41 MG/DL (ref 7–20)
BUN/CREAT SERPL: 51.3 (ref 6.3–21.9)
CALCIUM SPEC-SCNC: 8.6 MG/DL (ref 8.4–10.2)
CHLORIDE SERPL-SCNC: 103 MMOL/L (ref 98–107)
CO2 SERPL-SCNC: 35 MMOL/L (ref 26–30)
CREAT BLD-MCNC: 0.8 MG/DL (ref 0.6–1.3)
DEPRECATED RDW RBC AUTO: 39.9 FL (ref 37–54)
EOSINOPHIL # BLD AUTO: 0 10*3/MM3 (ref 0–0.7)
EOSINOPHIL NFR BLD AUTO: 0 % (ref 0–7)
ERYTHROCYTE [DISTWIDTH] IN BLOOD BY AUTOMATED COUNT: 11.9 % (ref 11.5–14.5)
GFR SERPL CREATININE-BSD FRML MDRD: 96 ML/MIN/1.73
GLUCOSE BLD-MCNC: 117 MG/DL (ref 74–98)
HCT VFR BLD AUTO: 36.3 % (ref 42–52)
HGB BLD-MCNC: 11.6 G/DL (ref 14–18)
IMM GRANULOCYTES # BLD: 0.05 10*3/MM3 (ref 0–0.06)
IMM GRANULOCYTES NFR BLD: 0.5 % (ref 0–0.6)
LYMPHOCYTES # BLD AUTO: 0.34 10*3/MM3 (ref 0.6–3.4)
LYMPHOCYTES NFR BLD AUTO: 3.2 % (ref 10–50)
MAGNESIUM SERPL-MCNC: 2.3 MG/DL (ref 1.6–2.3)
MCH RBC QN AUTO: 29.1 PG (ref 27–31)
MCHC RBC AUTO-ENTMCNC: 32 G/DL (ref 30–37)
MCV RBC AUTO: 91.2 FL (ref 80–94)
MONOCYTES # BLD AUTO: 0.52 10*3/MM3 (ref 0–0.9)
MONOCYTES NFR BLD AUTO: 4.9 % (ref 0–12)
NEUTROPHILS # BLD AUTO: 9.8 10*3/MM3 (ref 2–6.9)
NEUTROPHILS NFR BLD AUTO: 91.3 % (ref 37–80)
NRBC BLD MANUAL-RTO: 0 /100 WBC (ref 0–0)
PHOSPHATE SERPL-MCNC: 2.7 MG/DL (ref 2.5–4.5)
PLATELET # BLD AUTO: 184 10*3/MM3 (ref 130–400)
PMV BLD AUTO: 9.5 FL (ref 6–12)
POTASSIUM BLD-SCNC: 4.8 MMOL/L (ref 3.5–5.1)
RBC # BLD AUTO: 3.98 10*6/MM3 (ref 4.7–6.1)
SODIUM BLD-SCNC: 139 MMOL/L (ref 137–145)
WBC NRBC COR # BLD: 10.72 10*3/MM3 (ref 4.8–10.8)

## 2018-01-02 PROCEDURE — 97110 THERAPEUTIC EXERCISES: CPT

## 2018-01-02 PROCEDURE — 94799 UNLISTED PULMONARY SVC/PX: CPT

## 2018-01-02 PROCEDURE — 97530 THERAPEUTIC ACTIVITIES: CPT

## 2018-01-02 PROCEDURE — 85025 COMPLETE CBC W/AUTO DIFF WBC: CPT | Performed by: INTERNAL MEDICINE

## 2018-01-02 PROCEDURE — 25010000002 LORAZEPAM PER 2 MG: Performed by: INTERNAL MEDICINE

## 2018-01-02 PROCEDURE — 25010000002 LEVOFLOXACIN PER 250 MG: Performed by: INTERNAL MEDICINE

## 2018-01-02 PROCEDURE — 83735 ASSAY OF MAGNESIUM: CPT | Performed by: INTERNAL MEDICINE

## 2018-01-02 PROCEDURE — 25010000002 METHYLPREDNISOLONE PER 40 MG: Performed by: INTERNAL MEDICINE

## 2018-01-02 PROCEDURE — 25010000002 ENOXAPARIN PER 10 MG: Performed by: INTERNAL MEDICINE

## 2018-01-02 PROCEDURE — 80069 RENAL FUNCTION PANEL: CPT | Performed by: INTERNAL MEDICINE

## 2018-01-02 PROCEDURE — 94660 CPAP INITIATION&MGMT: CPT

## 2018-01-02 PROCEDURE — 99232 SBSQ HOSP IP/OBS MODERATE 35: CPT | Performed by: INTERNAL MEDICINE

## 2018-01-02 RX ORDER — METHYLPREDNISOLONE SODIUM SUCCINATE 40 MG/ML
40 INJECTION, POWDER, LYOPHILIZED, FOR SOLUTION INTRAMUSCULAR; INTRAVENOUS EVERY 12 HOURS
Status: DISCONTINUED | OUTPATIENT
Start: 2018-01-03 | End: 2018-01-04

## 2018-01-02 RX ORDER — TRAZODONE HYDROCHLORIDE 50 MG/1
50 TABLET ORAL NIGHTLY
Status: DISCONTINUED | OUTPATIENT
Start: 2018-01-02 | End: 2018-01-11 | Stop reason: HOSPADM

## 2018-01-02 RX ADMIN — LEVOFLOXACIN 750 MG: 5 INJECTION, SOLUTION INTRAVENOUS at 08:39

## 2018-01-02 RX ADMIN — ATORVASTATIN CALCIUM 40 MG: 40 TABLET, FILM COATED ORAL at 08:29

## 2018-01-02 RX ADMIN — CARVEDILOL 3.12 MG: 3.12 TABLET, FILM COATED ORAL at 21:10

## 2018-01-02 RX ADMIN — IPRATROPIUM BROMIDE AND ALBUTEROL SULFATE 3 ML: .5; 3 SOLUTION RESPIRATORY (INHALATION) at 23:49

## 2018-01-02 RX ADMIN — IPRATROPIUM BROMIDE AND ALBUTEROL SULFATE 3 ML: .5; 3 SOLUTION RESPIRATORY (INHALATION) at 00:57

## 2018-01-02 RX ADMIN — AMLODIPINE BESYLATE 5 MG: 5 TABLET ORAL at 08:29

## 2018-01-02 RX ADMIN — IPRATROPIUM BROMIDE AND ALBUTEROL SULFATE 3 ML: .5; 3 SOLUTION RESPIRATORY (INHALATION) at 07:00

## 2018-01-02 RX ADMIN — LOSARTAN POTASSIUM 50 MG: 50 TABLET, FILM COATED ORAL at 08:29

## 2018-01-02 RX ADMIN — FLUTICASONE PROPIONATE 1 SPRAY: 50 SPRAY, METERED NASAL at 08:27

## 2018-01-02 RX ADMIN — DULOXETINE HYDROCHLORIDE 30 MG: 30 CAPSULE, DELAYED RELEASE ORAL at 08:28

## 2018-01-02 RX ADMIN — CLOPIDOGREL BISULFATE 75 MG: 75 TABLET ORAL at 08:29

## 2018-01-02 RX ADMIN — IPRATROPIUM BROMIDE AND ALBUTEROL SULFATE 3 ML: .5; 3 SOLUTION RESPIRATORY (INHALATION) at 04:26

## 2018-01-02 RX ADMIN — Medication 1 EACH: at 21:11

## 2018-01-02 RX ADMIN — GUAIFENESIN AND DEXTROMETHORPHAN HYDROBROMIDE 2 TABLET: 600; 30 TABLET, EXTENDED RELEASE ORAL at 08:29

## 2018-01-02 RX ADMIN — POLYETHYLENE GLYCOL 3350 17 G: 17 POWDER, FOR SOLUTION ORAL at 08:39

## 2018-01-02 RX ADMIN — ENOXAPARIN SODIUM 40 MG: 40 INJECTION SUBCUTANEOUS at 21:08

## 2018-01-02 RX ADMIN — METHYLPREDNISOLONE SODIUM SUCCINATE 40 MG: 125 INJECTION, POWDER, FOR SOLUTION INTRAMUSCULAR; INTRAVENOUS at 16:01

## 2018-01-02 RX ADMIN — CEFTAZIDIME 2 G: 2 INJECTION, POWDER, FOR SOLUTION INTRAVENOUS at 13:09

## 2018-01-02 RX ADMIN — CEFTAZIDIME 2 G: 2 INJECTION, POWDER, FOR SOLUTION INTRAVENOUS at 05:27

## 2018-01-02 RX ADMIN — MIRTAZAPINE 15 MG: 15 TABLET, FILM COATED ORAL at 21:10

## 2018-01-02 RX ADMIN — IPRATROPIUM BROMIDE AND ALBUTEROL SULFATE 3 ML: .5; 3 SOLUTION RESPIRATORY (INHALATION) at 20:25

## 2018-01-02 RX ADMIN — LORAZEPAM 0.5 MG: 2 INJECTION INTRAMUSCULAR; INTRAVENOUS at 10:57

## 2018-01-02 RX ADMIN — METHYLPREDNISOLONE SODIUM SUCCINATE 40 MG: 125 INJECTION, POWDER, FOR SOLUTION INTRAMUSCULAR; INTRAVENOUS at 08:28

## 2018-01-02 RX ADMIN — LORAZEPAM 0.5 MG: 2 INJECTION INTRAMUSCULAR; INTRAVENOUS at 20:20

## 2018-01-02 RX ADMIN — BUDESONIDE 0.5 MG: 0.5 INHALANT RESPIRATORY (INHALATION) at 20:25

## 2018-01-02 RX ADMIN — CEFTAZIDIME 2 G: 2 INJECTION, POWDER, FOR SOLUTION INTRAVENOUS at 21:03

## 2018-01-02 RX ADMIN — BUDESONIDE 0.5 MG: 0.5 INHALANT RESPIRATORY (INHALATION) at 07:00

## 2018-01-02 RX ADMIN — FAMOTIDINE 20 MG: 10 INJECTION, SOLUTION INTRAVENOUS at 08:29

## 2018-01-02 RX ADMIN — FINASTERIDE 5 MG: 5 TABLET, FILM COATED ORAL at 08:29

## 2018-01-02 RX ADMIN — IPRATROPIUM BROMIDE AND ALBUTEROL SULFATE 3 ML: .5; 3 SOLUTION RESPIRATORY (INHALATION) at 12:35

## 2018-01-02 RX ADMIN — CARVEDILOL 3.12 MG: 3.12 TABLET, FILM COATED ORAL at 08:29

## 2018-01-02 RX ADMIN — TRAZODONE HYDROCHLORIDE 50 MG: 50 TABLET ORAL at 21:10

## 2018-01-02 RX ADMIN — METHYLPREDNISOLONE SODIUM SUCCINATE 40 MG: 125 INJECTION, POWDER, FOR SOLUTION INTRAMUSCULAR; INTRAVENOUS at 00:47

## 2018-01-02 RX ADMIN — FAMOTIDINE 20 MG: 10 INJECTION, SOLUTION INTRAVENOUS at 21:06

## 2018-01-02 RX ADMIN — IPRATROPIUM BROMIDE AND ALBUTEROL SULFATE 3 ML: .5; 3 SOLUTION RESPIRATORY (INHALATION) at 16:18

## 2018-01-02 RX ADMIN — OXYBUTYNIN CHLORIDE 5 MG: 5 TABLET ORAL at 05:32

## 2018-01-02 RX ADMIN — TAMSULOSIN HYDROCHLORIDE 0.4 MG: 0.4 CAPSULE ORAL at 21:10

## 2018-01-02 RX ADMIN — GUAIFENESIN AND DEXTROMETHORPHAN HYDROBROMIDE 2 TABLET: 600; 30 TABLET, EXTENDED RELEASE ORAL at 21:10

## 2018-01-02 NOTE — PLAN OF CARE
Problem: Patient Care Overview (Adult)  Goal: Plan of Care Review  Outcome: Ongoing (interventions implemented as appropriate)   01/02/18 1551   Coping/Psychosocial Response Interventions   Plan Of Care Reviewed With patient   Patient Care Overview   Progress progress toward functional goals is gradual   Outcome Evaluation   Outcome Summary/Follow up Plan PT treatment completed. Pt expiriencing shortness of air with activity and nurse requested pt stay on bipap during treatment. Pt performed bed mobility,transfer and seated B LE ther ex in sitting. Pt was left uic reclined with call light at hand. Pt very anxious during course of treatment. See flowsheet for details.        Problem: Inpatient Physical Therapy  Goal: Bed Mobility Goal LTG- PT  Outcome: Ongoing (interventions implemented as appropriate)   12/28/17 1711 01/02/18 1551   Bed Mobility PT LTG   Bed Mobility PT LTG, Date Established 12/28/17 --    Bed Mobility PT LTG, Time to Achieve 2 wks --    Bed Mobility PT LTG, Activity Type all bed mobility --    Bed Mobility PT LTG, Moroni Level minimum assist (75% patient effort) --    Bed Mobility PT LTG, Outcome --  goal ongoing     Goal: Transfer Training Goal 1 LTG- PT  Outcome: Ongoing (interventions implemented as appropriate)   12/28/17 1711 01/02/18 1551   Transfer Training PT LTG   Transfer Training PT LTG, Date Established 12/28/17 --    Transfer Training PT LTG, Time to Achieve 2 wks --    Transfer Training PT LTG, Activity Type sit to stand/stand to sit;bed to chair /chair to bed --    Transfer Training PT LTG, [place] Level minimum assist (75% patient effort) --    Transfer Training PT LTG, Assist Device walker, rolling --    Transfer Training PT LTG, Outcome --  goal ongoing

## 2018-01-02 NOTE — PLAN OF CARE
Problem: Patient Care Overview (Adult)  Goal: Plan of Care Review  Outcome: Ongoing (interventions implemented as appropriate)   01/02/18 1547   Coping/Psychosocial Response Interventions   Plan Of Care Reviewed With patient   Patient Care Overview   Progress improving       Problem: NPPV/CPAP (Adult)  Goal: Signs and Symptoms of Listed Potential Problems Will be Absent or Manageable (NPPV/CPAP)  Outcome: Ongoing (interventions implemented as appropriate)   01/02/18 1547   NPPV/CPAP   Problems Assessed (NPPV/CPAP) all   Problems Present (NPPV/CPAP) none

## 2018-01-02 NOTE — PROGRESS NOTES
LOS: 8 days   Patient Care Team:  Miguel Rojas MD as PCP - General  Miguel Rojas MD as PCP - Family Medicine  Patient care team: Hospitalist  Primary pulmonologist: Kevin Lopez M.D.  Chief Complaint: Acute on chronic respiratory failure in a patient who has history of COPD and advanced lung disease with recurrent COPD exacerbations with 3 hospitalizations required this year in 2017.    Subjective     Shortness of Breath         Subjective:  Symptoms:  He reports shortness of breath.    He is becoming more alert and less short of breath on nasal cannula oxygen high flow.  He is able to communicate much better now and less lethargic less facial edema as well.  He still has cough with scanty amount of sputum which is mucoid.  He does not have any fever or chills nor sweats.  He complains about high BiPAP pressures at 24/5 cm.  With the same pressure with correction of hypercapnia he did not have any complaints yesterday since he was quite lethargic and had delirium.    History taken from: patient chart family RN    Objective     Vital Signs  Temp:  [97.5 °F (36.4 °C)-98.3 °F (36.8 °C)] 98.3 °F (36.8 °C)  Heart Rate:  [64-76] 66  Resp:  [16-23] 16  BP: (110-138)/(57-83) 133/83    Objective   Patient is much more alert and oriented today.  Breath sounds are present bilaterally with basal crackles posteriorly with scattered rhonchi bilaterally with distant breath sounds due to emphysema.  Nasal mucosal congestion noted with boggy mucosa.  Oral cavity: Moist with no oral ulcerations or patches.  Thoracic exam: As mentioned above with  heart sounds with regular rhythm but distant heart sounds.  Abdominal exam: Soft nontender nondistended abdomen.  Extremities: Loss of muscle mass with no significant peripheral edema nor nail bed cyanosis nor clubbing noted.    Results Review:     I reviewed the patient's new clinical results.    Medication Review: No new changes on medications currently.    Assessment/Plan     Active  Problems:    Acute exacerbation of chronic obstructive pulmonary disease (COPD)  Cor pulmonale with right ventricular failure improving.  Acute bilateral bronchopneumonia leading to COPD exacerbation rest.  Failure which seems to be gradually improving and able to eat orally better.  History of continued smoking even smaller amounts which also leads to continued progression of his disease but also possibility of COPD exacerbations.  Chronic depression since loss of his wife last year but also his progressive current respiratory illness makes him more depressed as well due to loss of functional status.  Plan and suggest:  1.  Discuss with patient how to completely avoid smoking further in future.  2.  Patient will require rehabilitation to physically improve his functional status since he really becomes very weak with acute exacerbations quickly.  3.  Patient needs to have aggressive nutritional support with high proteins and calorie requirement with his muscle mass loss in a patient with COPD with chronic respiratory failure.  4.  I will reduce his BiPAP pressures to 20/5 cm which he should still be using it most the time except 1 to 2 hours during mealtime.  Overall patient has shown marked improvement in his respiratory status and mental status.    Assessment & Plan    Kevin Lopez MD  01/01/18  8:11 PM

## 2018-01-02 NOTE — PLAN OF CARE
Problem: NPPV/CPAP (Adult)  Intervention: Prevent Aspiration During Therapy   01/02/18 0100   Positioning   Head Of Bed (HOB) Position HOB elevated     Intervention: Assess/Manage Patient Tolerance of Noninvasive Ventilation   01/02/18 0213   Respiratory Interventions   Airway/Ventilation Management airway patency maintained;humidification applied     Intervention: Prevent/Minimize Device Friction/Shearing and Pressure Points   01/02/18 0213   Respiratory Interventions   NPPV/CPAP Maintenance mask adjusted       Goal: Signs and Symptoms of Listed Potential Problems Will be Absent or Manageable (NPPV/CPAP)  Outcome: Ongoing (interventions implemented as appropriate)   01/02/18 0213   NPPV/CPAP   Problems Assessed (NPPV/CPAP) hypoxia/hypoxemia;situational response;skin breakdown;dry mucous membranes   Problems Present (NPPV/CPAP) skin breakdown;hypoxia/hypoxemia

## 2018-01-02 NOTE — PLAN OF CARE
Problem: Patient Care Overview (Adult)  Goal: Plan of Care Review  Outcome: Ongoing (interventions implemented as appropriate)   01/02/18 1424   Coping/Psychosocial Response Interventions   Plan Of Care Reviewed With patient   Patient Care Overview   Progress improving       Problem: NPPV/CPAP (Adult)  Goal: Signs and Symptoms of Listed Potential Problems Will be Absent or Manageable (NPPV/CPAP)  Outcome: Ongoing (interventions implemented as appropriate)   01/02/18 1424   NPPV/CPAP   Problems Assessed (NPPV/CPAP) all   Problems Present (NPPV/CPAP) none

## 2018-01-02 NOTE — THERAPY TREATMENT NOTE
Acute Care - Occupational Therapy Treatment Note   [place]     Patient Name: Thurman Mendel Parsons  : 1950  MRN: [id number]  Today's Date: 2018  Onset of Illness/Injury or Date of Surgery Date: 17  Date of Referral to OT: 17  Referring Physician: Dr. Huber      Admit Date: 2017    Visit Dx:     ICD-10-CM ICD-9-CM   1. Acute exacerbation of chronic obstructive pulmonary disease (COPD) J44.1 491.21   2. Acute on chronic respiratory failure with hypercapnia J96.22 518.84   3. Impaired mobility and ADLs Z74.09 799.89   4. Impaired functional mobility, balance, gait, and endurance Z74.09 V49.89     Patient Active Problem List   Diagnosis   • Anxiety   • Atherosclerotic heart disease of native coronary artery without angina pectoris   • Atrial fibrillation   • Chronic kidney disease, stage III (moderate)   • Steroid-dependent COPD   • Degeneration of cervical intervertebral disc   • Diabetes mellitus   • GERD without esophagitis   • Diverticulosis   • Hemorrhoids   • Hiatal hernia   • Hyperlipidemia   • Hypertension   • Kyphosis, acquired   • Mitral and aortic valve disease   • Phimosis   • Sleep apnea   • Enlarged prostate without lower urinary tract symptoms (luts)   • History of arthritis   • Back pain   • Depression   • Stroke syndrome   • History of ventricular septal defect   • Neck pain   • Impaired mobility and ADLs   • Physical deconditioning   • Acute exacerbation of chronic obstructive pulmonary disease (COPD)             Adult Rehabilitation Note       18 1353 17 1206       Rehab Assessment/Intervention    Discipline occupational therapist  - physical therapist  -JR     Document Type therapy note (daily note)  - therapy note (daily note)  -JR     Subjective Information agree to therapy;complains of;weakness;pain  - agree to therapy;complains of;fatigue;dyspnea  -JR     Patient Effort, Rehab Treatment adequate  - adequate  -JR     Symptoms Noted  During/After Treatment fatigue;shortness of breath  - fatigue;shortness of breath  -     Symptoms Noted Comment  oxygen saturation levels remain 93-94% throughout treatment session  -     Precautions/Limitations oxygen therapy device and L/min  - oxygen therapy device and L/min   Hi-marc  -JR     Patient Response to Treatment  Patient is very tired after treatment and requests pain meds and to put his BiPAP back on  -JR     Recorded by [] Akilah Gutierrez [JR] Kaylee Kurtz, PT     Vital Signs    Pre SpO2 (%) 100  -AH 94  -JR     O2 Delivery Pre Treatment supplemental O2   hi flow   -AH supplemental O2   Hi-marc  -JR     Intra SpO2 (%) 96  -AH 93  -JR     O2 Delivery Intra Treatment supplemental O2   hi flow  -AH supplemental O2   Hi-marc  -JR     Post SpO2 (%) 99  -AH 94  -JR     O2 Delivery Post Treatment supplemental O2   hi flow  -AH supplemental O2   Hi-marc  -JR     Pre Patient Position  Supine  -JR     Intra Patient Position  Supine  -JR     Post Patient Position  Supine  -JR     Recorded by [] Akilah Gutierrez [JR] Kaylee Kurtz, PT     Pain Assessment    Pain Assessment 0-10  - 0-10  -     Pain Score 7  - unable to assess   c/o generalized pain, but does not rate  -JR     Post Pain Score 7  - unable to assess   c/o generalized pain, but does not rate  -JR     Pain Type Acute pain  -      Pain Location Arm  -      Pain Orientation Left;Proximal   shoulder  -      Pain Intervention(s) Repositioned   RN notified  -      Recorded by [] Akilah Gutierrez [JR] Kaylee Kurtz, PT     Bed Mobility, Assessment/Treatment    Bed Mob, Sit to Supine, [place] minimum assist (75% patient effort)  -      Recorded by [] Akilah Gutierrez      Transfer Assessment/Treatment    Transfers, Chair-Bed [place] moderate assist (50% patient effort)  -      Transfers, Sit-Stand [place] moderate assist (50% patient effort)  -      Transfers, Stand-Sit [place] minimum assist (75% patient effort)  -       Recorded by [] Akilah Gutierrez      Therapy Exercises    Bilateral Lower Extremities  AAROM:;5 reps;supine;ankle pumps/circles;clam shell;heel slides;hip abduction/adduction;hip ER;hip flexion;knee flexion  -JR     Bilateral Upper Extremity AAROM:;5 reps;elbow flexion/extension;shoulder extension/flexion  - AAROM:;10 reps;supine;elbow flexion/extension;hand pumps;shoulder abduction/adduction;shoulder extension/flexion  -JR     Recorded by [] Akilah Gutierrez [JR] Kaylee Kurtz, PT     Positioning and Restraints    Pre-Treatment Position sitting in chair/recliner  - in bed  -     Post Treatment Position bed  - bed  -JR     In Bed supine;call light within reach;encouraged to call for assist;exit alarm on  - supine;call light within reach;encouraged to call for assist  -JR     Recorded by [] Akilah Gutierrez [JR] Kaylee Kurtz PT       User Key  (r) = Recorded By, (t) = Taken By, (c) = Cosigned By    Initials Name Effective Dates     Akilah Gutierrez 10/26/16 -     JR Kaylee Kurtz, PT 10/26/16 -                 OT Goals       01/02/18 1519 12/28/17 1404       Bed Mobility OT LTG    Bed Mobility OT LTG, Date Established  12/28/17  -     Bed Mobility OT LTG, Time to Achieve  by discharge  -     Bed Mobility OT LTG, Activity Type  supine to sit/sit to supine  -     Bed Mobility OT LTG, [place] Level  minimum assist (75% patient effort)  -     Bed Mobility OT LTG, Date Goal Reviewed 01/02/18  -      Bed Mobility OT LTG, Outcome goal ongoing  - goal ongoing  -     Transfer Training OT LTG    Transfer Training OT LTG, Date Established  12/28/17  -     Transfer Training OT LTG, Time to Achieve  by discharge  -     Transfer Training OT LTG, Activity Type  sit to stand/stand to sit;bed to chair /chair to bed  -     Transfer Training OT LTG, [place] Level  minimum assist (75% patient effort)  -     Transfer Training OT LTG, Date Goal Reviewed 01/02/18  -      Transfer Training OT LTG,  Outcome goal ongoing  -AH goal ongoing  -AH     Strength OT LTG    Strength Goal OT LTG, Date Established  12/28/17  -AH     Strength Goal OT LTG, Time to Achieve  by discharge  -     Strength Goal OT LTG, Functional Goal  Pt will perform 15 reps UE ex progressing from AROM to strengthening as pt tolerates  -AH     Strength Goal OT LTG, Date Goal Reviewed 01/02/18  -AH      Strength Goal OT LTG, Outcome goal ongoing  -AH goal ongoing  -AH     LB Dressing OT LTG    LB Dressing Goal OT LTG, Date Established  12/28/17  -     LB Dressing Goal OT LTG, Time to Achieve  by discharge  -     LB Dressing Goal OT LTG, [place] Level  moderate assist (50% patient effort)  -     LB Dressing Goal OT LTG, Date Goal Reviewed 01/02/18  -      LB Dressing Goal OT LTG, Outcome goal ongoing  - goal ongoing  -     UB Dressing OT LTG    UB Dressing Goal OT LTG, Date Established  12/28/17  -     UB Dressing Goal OT LTG, Time to Achieve  by discharge  -     UB Dressing Goal OT LTG, [place] Level  minimum assist (75% patient effort)  -     UB Dressing Goal OT LTG, Date Goal Reviewed 01/02/18  -      UB Dressing Goal OT LTG, Outcome goal ongoing  - goal ongoing  -       User Key  (r) = Recorded By, (t) = Taken By, (c) = Cosigned By    Initials Name Provider Type    SOWMYA Gutierrez Occupational Therapist          Occupational Therapy Education     Title: PT OT SLP Therapies (Active)     Topic: Occupational Therapy (Active)     Point: ADL training (Done)    Description: Instruct learner(s) on proper safety adaptation and remediation techniques during self care or transfers.   Instruct in proper use of assistive devices.    Learning Progress Summary    Learner Readiness Method Response Comment Documented by Status   Patient Acceptance E VU benefit of working with therapy to regain strength  01/02/18 1518 Done    Acceptance E NR  CH 01/02/18 0334 Active    Acceptance E VU Role of OT/POC  12/28/17 1401 Done    Family Acceptance E VU   01/01/18 0103 Done    Acceptance E VU Role of OT/POC  12/28/17 1401 Done               Point: Home exercise program (Done)    Description: Instruct learner(s) on appropriate technique for monitoring, assisting and/or progressing therapeutic exercises/activities.    Learning Progress Summary    Learner Readiness Method Response Comment Documented by Status   Patient Acceptance E VU benefit of working with therapy to regain strength  01/02/18 1518 Done                      User Key     Initials Effective Dates Name Provider Type Discipline     10/26/16 -  Akilah Gutierrez Occupational Therapist OT     08/31/17 -  Mayda Nguyen, RN Registered Nurse Nurse     01/20/17 -  Maryjane Subramanian, RN Registered Nurse Nurse                  OT Recommendation and Plan  Anticipated Discharge Disposition: inpatient rehabilitation facility  Planned Therapy Interventions: ADL retraining, bed mobility training, strengthening, transfer training  Therapy Frequency: 3-5 times/wk  Plan of Care Review  Plan Of Care Reviewed With: patient  Progress: improving  Outcome Summary/Follow up Plan: Pt received sitting reclined in his chair.  Pt c/o pain L shoulder.  OT provided gentle passive ROM to LUE.  Pt completed AAROM ex x5 reps as per flowsheet.  Pt mod assist to stand from chair and transfer from chair back to bed.  Min assist sit to supine in bed.  Pt was left in bed with bed alarm on.        Outcome Measures       01/02/18 1353 12/31/17 1206       How much help from another person do you currently need...    Turning from your back to your side while in flat bed without using bedrails?  1  -JR     Moving from lying on back to sitting on the side of a flat bed without bedrails?  1  -JR     Moving to and from a bed to a chair (including a wheelchair)?  1  -JR     Standing up from a chair using your arms (e.g., wheelchair, bedside chair)?  1  -JR     Climbing 3-5 steps with a railing?  1  -JR     To walk  in hospital room?  1  -JR     AM-PAC 6 Clicks Score  6  -JR     How much help from another is currently needed...    Putting on and taking off regular lower body clothing? 1  -AH      Bathing (including washing, rinsing, and drying) 1  -AH      Toileting (which includes using toilet bed pan or urinal) 1  -AH      Putting on and taking off regular upper body clothing 2  -AH      Taking care of personal grooming (such as brushing teeth) 2  -AH      Eating meals 2  -      Score 9  -      Functional Assessment    Outcome Measure Options AM-PAC 6 Clicks Daily Activity (OT)  -[facility] 6 Clicks Basic Mobility (PT)  -       User Key  (r) = Recorded By, (t) = Taken By, (c) = Cosigned By    Initials Name Provider Type     Akilah Gutierrez Occupational Therapist    JR Kaylee Kurtz, PT Physical Therapist           Time Calculation:         Time Calculation- OT       01/02/18 1523          Time Calculation- OT    OT Start Time 1353  -      Total Timed Code Minutes- OT 18 minute(s)  -      OT Received On 01/02/18  -      OT Goal Re-Cert Due Date 01/07/18  -        User Key  (r) = Recorded By, (t) = Taken By, (c) = Cosigned By    Initials Name Provider Type     Akilah Gutierrez Occupational Therapist           Therapy Charges for Today     Code Description Service Date Service Provider Modifiers Qty    [id number]  OT THER PROC EA 15 MIN 1/2/2018 Akilah Gutierrez GO 1               Akilah Gutierrez  1/2/2018

## 2018-01-02 NOTE — PROGRESS NOTES
[facility]    Name:  Thurman Mendel Parsons   Age:  67 y.o.  Sex:  male  :  1950  MRN:  [id number]   Visit Number:  [id number]  Admission Date:  2017  Date Of Service:  18  Primary Care Physician:  Miguel Rojas MD     LOS: 9 days :  Patient Care Team:  Miguel Rojas MD as PCP - General  Miguel Rojas MD as PCP - Family Medicine:    History taken from:     patient    Chief Complaint:      Dyspnea    Subjective     Interval History:     Patient seen again today.  Patient is a 67-year-old male with history of chronic respiratory failure secondary to COPD, PEDRO, hypertension.  He has been intermittently compliant with his BiPAP at home.  He was admitted with acute on chronic hypoxic hypercapnic respiratory failure.  He also has bilateral pneumonia.  He was initially on vancomycin, Fortaz and Levaquin.  Vancomycin has been discontinued after cultures have been negative.  He was transitioned to prednisone by Dr. Lopez.  He continues with breathing treatments.  He is also on high flow oxygen.  He appears to be improving.    He has some problems with sleeping.  He wants something for that.  He is tolerating the BiPAP well.  Encouraged him to eat more and participating with PT and OT.  Appreciate Dr. Lopez input.  He should finish up with antibiotics in the next couple of days.  He has no other new complaints.  He continues to be weak, weaker in the left arm.  Encouraged him to work with therapy.    Review of Systems:     All systems were reviewed and negative except for:  Respiratory: positive for  shortness of air    Objective     Vital Signs:    Temp:  [97.2 °F (36.2 °C)-98.3 °F (36.8 °C)] 97.4 °F (36.3 °C)  Heart Rate:  [59-90] 90  Resp:  [14-24] 18  BP: (110-138)/(57-90) 112/90    Physical Exam:      General Appearance:    Alert, cooperative, in no acute distress   Head:    Normocephalic, without obvious abnormality, atraumatic   Eyes:            Lids  and lashes normal, conjunctivae and sclerae normal, no   icterus, no pallor, corneas clear, PERRLA   Ears:    Ears appear intact with no abnormalities noted   Throat:   No oral lesions, no thrush, oral mucosa moist   Neck:   No adenopathy, supple, trachea midline, no thyromegaly, no   carotid bruit, no JVD   Back:     No kyphosis present, no scoliosis present, no skin lesions,      erythema or scars, no tenderness to percussion or                   palpation,   range of motion normal   Lungs:    Rhonchi bilaterally without uses accessory muscles respiration.      Heart:    Regular rhythm and normal rate, normal S1 and S2, no            murmur, no gallop, no rub, no click   Chest Wall:    No abnormalities observed   Abdomen:     Normal bowel sounds, no masses, no organomegaly, soft        non-tender, non-distended, no guarding, no rebound                tenderness   Rectal:     Deferred   Extremities:   4/5 strength in upper/lower extremity is bilaterally weaker on the left.     Pulses:   Pulses palpable and equal bilaterally   Skin:   No bleeding, bruising or rash   Lymph nodes:   No palpable adenopathy   Neurologic:   Cranial nerves 2 - 12 grossly intact, sensation intact, DTR       present and equal bilaterally        Results Review:      I reviewed the patient's new clinical results.    Labs:    Lab Results (last 24 hours)     Procedure Component Value Units Date/Time    Renal Function Panel [[id number]]  (Abnormal) Collected:  01/02/18 0558    Specimen:  Blood Updated:  01/02/18 0648     Glucose 117 (H) mg/dL      BUN 41 (H) mg/dL      Creatinine 0.80 mg/dL      Sodium 139 mmol/L      Potassium 4.8 mmol/L      Chloride 103 mmol/L      CO2 35.0 (H) mmol/L      Calcium 8.6 mg/dL      Albumin 2.90 (L) g/dL      Phosphorus 2.7 mg/dL      Anion Gap 5.8 mmol/L      BUN/Creatinine Ratio 51.3 (H)     eGFR Non African Amer 96 mL/min/1.73     Narrative:       Abnormal estimated GFR should be followed by more specific  studies to confirm end stage chronic renal disease. The equation used for calculation may not be accurate for patients less than 19 years old, greater than 70 years old, patients at extremes of weight, malnutrition, or with acute renal dysfunction.    Magnesium [[id number]]  (Normal) Collected:  01/02/18 0558    Specimen:  Blood Updated:  01/02/18 0648     Magnesium 2.3 mg/dL     CBC & Differential [[id number]] Collected:  01/02/18 0558    Specimen:  Blood Updated:  01/02/18 0658    Narrative:       The following orders were created for panel order CBC & Differential.  Procedure                               Abnormality         Status                     ---------                               -----------         ------                     CBC Auto Differential[[id number]]        Abnormal            Final result                 Please view results for these tests on the individual orders.    CBC Auto Differential [[id number]]  (Abnormal) Collected:  01/02/18 0558    Specimen:  Blood Updated:  01/02/18 0658     WBC 10.72 10*3/mm3      RBC 3.98 (L) 10*6/mm3      Hemoglobin 11.6 (L) g/dL      Hematocrit 36.3 (L) %      MCV 91.2 fL      MCH 29.1 pg      MCHC 32.0 g/dL      RDW 11.9 %      RDW-SD 39.9 fl      MPV 9.5 fL      Platelets 184 10*3/mm3      Neutrophil % 91.3 (H) %      Lymphocyte % 3.2 (L) %      Monocyte % 4.9 %      Eosinophil % 0.0 %      Basophil % 0.1 %      Immature Grans % 0.5 %      Neutrophils, Absolute 9.80 (H) 10*3/mm3      Lymphocytes, Absolute 0.34 (L) 10*3/mm3      Monocytes, Absolute 0.52 10*3/mm3      Eosinophils, Absolute 0.00 10*3/mm3      Basophils, Absolute 0.01 10*3/mm3      Immature Grans, Absolute 0.05 10*3/mm3      nRBC 0.0 /100 WBC            Radiology:    Imaging Results (last 24 hours)     ** No results found for the last 24 hours. **          Medication Review:       amLODIPine 5 mg Oral Q24H   atorvastatin 40 mg Oral Daily   budesonide 0.5 mg Nebulization BID - RT   carvedilol 3.125  mg Oral Q12H   ceftazidime 2 g Intravenous Q8H   CHAPSTICK 1 each Apply externally BID   clopidogrel 75 mg Oral Daily   DULoxetine 30 mg Oral Daily   enoxaparin 40 mg Subcutaneous Q24H   famotidine 20 mg Intravenous Q12H   finasteride 5 mg Oral Daily   fluticasone 1 spray Nasal Daily   guaifenesin-dextromethorphan 2 tablet Oral BID   ipratropium-albuterol 3 mL Nebulization Q4H - RT   levoFLOXacin 750 mg Intravenous Q24H   losartan 50 mg Oral Daily   methylPREDNISolone sodium succinate 40 mg Intravenous Q8H   mirtazapine 15 mg Oral Nightly   oxybutynin 5 mg Oral QAM   polyethylene glycol 17 g Oral Daily   tamsulosin 0.4 mg Oral Nightly            Assessment/Plan     Problem List Items Addressed This Visit     Impaired mobility and ADLs    Acute exacerbation of chronic obstructive pulmonary disease (COPD) - Primary    Relevant Medications    BREO ELLIPTA 200-25 MCG/INH aerosol powder     PROAIR  (90 BASE) MCG/ACT inhaler    budesonide (PULMICORT) 0.5 MG/2ML nebulizer solution    ipratropium-albuterol (DUO-NEB) 0.5-2.5 mg/mL nebulizer    predniSONE (DELTASONE) 10 MG tablet    benzonatate (TESSALON) 200 MG capsule    HYDROcod Polst-CPM Polst ER (TUSSIONEX PENNKINETIC) 10-8 MG/5ML ER suspension    pseudoephedrine-guaifenesin (MUCINEX D)  MG per 12 hr tablet    fluticasone (FLONASE) 50 MCG/ACT nasal spray    predniSONE (DELTASONE) 10 MG tablet      Other Visit Diagnoses     Acute on chronic respiratory failure with hypercapnia              Acute exacerbation of chronic obstructive pulmonary disease (COPD)  Bilateral pneumonia, present on admission  Elevated troponin, Suspect type II MI  Hypertension  Right bundle-branch block  Obstructive sleep apnea with noncompliance to BiPAP  Acute on chronic hypoxic respiratory failure With hypercapnia    Plan:    Continue with Fortaz and Levaquin, DuoNeb, prednisone, and Pulmicort.  Patient is on Tussionex, Mucinex D, and Tessalon.  Continue with PT and OT. Patient will  get trazodone tonight.  Continue to monitor lab work.  Encouraged to increase oral intake and work with PT and OT.  Hopefully patient can go to rehabilitation later this week.    Joshua Mendez DO  01/02/18  11:59 AM

## 2018-01-02 NOTE — PLAN OF CARE
Problem: Patient Care Overview (Adult)  Goal: Plan of Care Review  Outcome: Ongoing (interventions implemented as appropriate)   01/02/18 1519   Coping/Psychosocial Response Interventions   Plan Of Care Reviewed With patient   Patient Care Overview   Progress improving   Outcome Evaluation   Outcome Summary/Follow up Plan Pt received sitting reclined in his chair. Pt c/o pain L shoulder. OT provided gentle passive ROM to LUE. Pt completed AAROM ex x5 reps as per flowsheet. Pt mod assist to stand from chair and transfer from chair back to bed. Min assist sit to supine in bed. Pt was left in bed with bed alarm on.       Problem: Inpatient Occupational Therapy  Goal: Bed Mobility Goal LTG- OT  Outcome: Ongoing (interventions implemented as appropriate)   12/28/17 1404 01/02/18 1519   Bed Mobility OT LTG   Bed Mobility OT LTG, Date Established 12/28/17 --    Bed Mobility OT LTG, Time to Achieve by discharge --    Bed Mobility OT LTG, Activity Type supine to sit/sit to supine --    Bed Mobility OT LTG, [place] Level minimum assist (75% patient effort) --    Bed Mobility OT LTG, Date Goal Reviewed --  01/02/18   Bed Mobility OT LTG, Outcome --  goal ongoing     Goal: Transfer Training Goal 1 LTG- OT  Outcome: Ongoing (interventions implemented as appropriate)   12/28/17 1404 01/02/18 1519   Transfer Training OT LTG   Transfer Training OT LTG, Date Established 12/28/17 --    Transfer Training OT LTG, Time to Achieve by discharge --    Transfer Training OT LTG, Activity Type sit to stand/stand to sit;bed to chair /chair to bed --    Transfer Training OT LTG, [place] Level minimum assist (75% patient effort) --    Transfer Training OT LTG, Date Goal Reviewed --  01/02/18   Transfer Training OT LTG, Outcome --  goal ongoing     Goal: Strength Goal LTG- OT  Outcome: Ongoing (interventions implemented as appropriate)   12/28/17 1404 01/02/18 1519   Strength OT LTG   Strength Goal OT LTG, Date Established 12/28/17 --     Strength Goal OT LTG, Time to Achieve by discharge --    Strength Goal OT LTG, Functional Goal Pt will perform 15 reps UE ex progressing from AROM to strengthening as pt tolerates --    Strength Goal OT LTG, Date Goal Reviewed --  01/02/18   Strength Goal OT LTG, Outcome --  goal ongoing     Goal: LB Dressing Goal LTG- OT  Outcome: Ongoing (interventions implemented as appropriate)   12/28/17 1404 01/02/18 1519   LB Dressing OT LTG   LB Dressing Goal OT LTG, Date Established 12/28/17 --    LB Dressing Goal OT LTG, Time to Achieve by discharge --    LB Dressing Goal OT LTG, [place] Level moderate assist (50% patient effort) --    LB Dressing Goal OT LTG, Date Goal Reviewed --  01/02/18   LB Dressing Goal OT LTG, Outcome --  goal ongoing     Goal: UB Dressing Goal LTG- OT  Outcome: Ongoing (interventions implemented as appropriate)   12/28/17 1404 01/02/18 1519   UB Dressing OT LTG   UB Dressing Goal OT LTG, Date Established 12/28/17 --    UB Dressing Goal OT LTG, Time to Achieve by discharge --    UB Dressing Goal OT LTG, [place] Level minimum assist (75% patient effort) --    UB Dressing Goal OT LTG, Date Goal Reviewed --  01/02/18   UB Dressing Goal OT LTG, Outcome --  goal ongoing

## 2018-01-02 NOTE — THERAPY TREATMENT NOTE
Acute Care - Physical Therapy Treatment Note   [place]     Patient Name: Thurman Mendel Parsons  : 1950  MRN: [id number]  Today's Date: 2018  Onset of Illness/Injury or Date of Surgery Date: 17  Date of Referral to PT: 17  Referring Physician: Dr. Huber    Admit Date: 2017    Visit Dx:    ICD-10-CM ICD-9-CM   1. Acute exacerbation of chronic obstructive pulmonary disease (COPD) J44.1 491.21   2. Acute on chronic respiratory failure with hypercapnia J96.22 518.84   3. Impaired mobility and ADLs Z74.09 799.89   4. Impaired functional mobility, balance, gait, and endurance Z74.09 V49.89     Patient Active Problem List   Diagnosis   • Anxiety   • Atherosclerotic heart disease of native coronary artery without angina pectoris   • Atrial fibrillation   • Chronic kidney disease, stage III (moderate)   • Steroid-dependent COPD   • Degeneration of cervical intervertebral disc   • Diabetes mellitus   • GERD without esophagitis   • Diverticulosis   • Hemorrhoids   • Hiatal hernia   • Hyperlipidemia   • Hypertension   • Kyphosis, acquired   • Mitral and aortic valve disease   • Phimosis   • Sleep apnea   • Enlarged prostate without lower urinary tract symptoms (luts)   • History of arthritis   • Back pain   • Depression   • Stroke syndrome   • History of ventricular septal defect   • Neck pain   • Impaired mobility and ADLs   • Physical deconditioning   • Acute exacerbation of chronic obstructive pulmonary disease (COPD)               Adult Rehabilitation Note       18 1353 18 1052 17 1206    Rehab Assessment/Intervention    Discipline occupational therapist  - physical therapy assistant  -RM physical therapist  -JR    Document Type therapy note (daily note)  - therapy note (daily note)  -RM therapy note (daily note)  -JR    Subjective Information agree to therapy;complains of;weakness;pain  - agree to therapy;complains of;fatigue  -RM agree to therapy;complains  of;fatigue;dyspnea  -JR    Patient Effort, Rehab Treatment adequate  -AH fair  -RM adequate  -JR    Symptoms Noted During/After Treatment fatigue;shortness of breath  - fatigue;shortness of breath  - fatigue;shortness of breath  -    Symptoms Noted Comment   oxygen saturation levels remain 93-94% throughout treatment session  -JR    Precautions/Limitations oxygen therapy device and L/min  - oxygen therapy device and L/min;fall precautions  - oxygen therapy device and L/min   Hi-marc  -JR    Specific Treatment Considerations  on BiPap  -RM     Patient Response to Treatment   Patient is very tired after treatment and requests pain meds and to put his BiPAP back on  -JR    Recorded by [] Akilah Gutierrez [] Zenon Rothman, HELADIO [JR] Kaylee Kurtz, PT    Vital Signs    Pre SpO2 (%) 100  -AH 95  -RM 94  -JR    O2 Delivery Pre Treatment supplemental O2   hi flow   -AH supplemental O2  -RM supplemental O2   Hi-marc  -JR    Intra SpO2 (%) 96  -AH 97  -RM 93  -JR    O2 Delivery Intra Treatment supplemental O2   hi flow  -AH supplemental O2  -RM supplemental O2   Hi-marc  -JR    Post SpO2 (%) 99  -AH 95  -RM 94  -JR    O2 Delivery Post Treatment supplemental O2   hi flow  -AH supplemental O2  -RM supplemental O2   Hi-marc  -JR    Pre Patient Position  Supine  -RM Supine  -JR    Intra Patient Position  Standing  -RM Supine  -JR    Post Patient Position  Sitting  - Supine  -JR    Recorded by [] Akilah Gutierrez [] Zenon Rothman, HELADIO [JR] Kaylee Kurtz, PT    Pain Assessment    Pain Assessment 0-10  - Unable to assess  - 0-10  -    Pain Score 7  -  unable to assess   c/o generalized pain, but does not rate  -JR    Post Pain Score 7  -  unable to assess   c/o generalized pain, but does not rate  -JR    Pain Type Acute pain  -      Pain Location Arm  -      Pain Orientation Left;Proximal   shoulder  -      Pain Intervention(s) Repositioned   RN notified  -      Recorded by [] Akilah Gutierrez [] Zenon  DARLINE Rothman PTA [JR] Kaylee Kurtz, PT    Bed Mobility, Assessment/Treatment    Bed Mobility, Assistive Device  head of bed elevated;bed rails  -     Bed Mob, Supine to Sit, Carter  minimum assist (75% patient effort)  -     Bed Mob, Sit to Supine, [place] minimum assist (75% patient effort)  -      Bed Mobility, Safety Issues  decreased use of arms for pushing/pulling;decreased use of legs for bridging/pushing  -     Bed Mobility, Impairments  strength decreased  -RM     Recorded by [] Akilah Gutierrez [] Zenon Rothman PTA     Transfer Assessment/Treatment    Transfers, Bed-Chair Carter  minimum assist (75% patient effort);verbal cues required;nonverbal cues required (demo/gesture)  -     Transfers, Chair-Bed [place] moderate assist (50% patient effort)  -      Transfers, Sit-Stand [place] moderate assist (50% patient effort)  - verbal cues required;minimum assist (75% patient effort)  -     Transfers, Stand-Sit [place] minimum assist (75% patient effort)  - verbal cues required;minimum assist (75% patient effort)  -     Recorded by [] Akilah Gutierrez [] Zenon Rothman PTA     Therapy Exercises    Bilateral Lower Extremities  AAROM:;sitting;ankle pumps/circles;hip flexion;hip extension;LAQ  - AAROM:;5 reps;supine;ankle pumps/circles;clam shell;heel slides;hip abduction/adduction;hip ER;hip flexion;knee flexion  -    Bilateral Upper Extremity AAROM:;5 reps;elbow flexion/extension;shoulder extension/flexion  -  AAROM:;10 reps;supine;elbow flexion/extension;hand pumps;shoulder abduction/adduction;shoulder extension/flexion  -JR    Recorded by [] Akilah Gutierrez [] Zenon Rothman PTA [JR] Kaylee Kurtz, PT    Positioning and Restraints    Pre-Treatment Position sitting in chair/recliner  - in bed  -RM in bed  -JR    Post Treatment Position bed  - chair  -RM bed  -JR    In Bed supine;call light within reach;encouraged to call for assist;exit alarm  on  -  supine;call light within reach;encouraged to call for assist  -JR    In Chair  reclined;call light within reach;encouraged to call for assist;exit alarm on;notified nsg  -RM     Recorded by [] Akilah Gutierrez [] eZnon Rothman, PTA [JR] Kaylee Kurtz, PT      User Key  (r) = Recorded By, (t) = Taken By, (c) = Cosigned By    Initials Name Effective Dates     Akilah Gutierrez 10/26/16 -     JR Kaylee Kurtz, PT 10/26/16 -     RM Zenon Rothman, PTA 10/26/16 -                 IP PT Goals       01/02/18 1551 12/31/17 1532 12/28/17 1711    Bed Mobility PT LTG    Bed Mobility PT LTG, Date Established   12/28/17  -JR    Bed Mobility PT LTG, Time to Achieve   2 wks  -JR    Bed Mobility PT LTG, Activity Type   all bed mobility  -JR    Bed Mobility PT LTG, [place] Level   minimum assist (75% patient effort)  -JR    Bed Mobility PT LTG, Outcome goal ongoing  -RM goal ongoing  -JR     Transfer Training PT LTG    Transfer Training PT LTG, Date Established   12/28/17  -JR    Transfer Training PT LTG, Time to Achieve   2 wks  -JR    Transfer Training PT LTG, Activity Type   sit to stand/stand to sit;bed to chair /chair to bed  -JR    Transfer Training PT LTG, [place] Level   minimum assist (75% patient effort)  -JR    Transfer Training PT LTG, Assist Device   walker, rolling  -JR    Transfer Training PT LTG, Outcome goal ongoing  -RM goal ongoing  -JR     Gait Training PT LTG    Gait Training Goal PT LTG, Date Established   12/28/17  -JR    Gait Training Goal PT LTG, Time to Achieve   2 wks  -JR    Gait Training Goal PT LTG, [place] Level   minimum assist (75% patient effort)  -JR    Gait Training Goal PT LTG, Assist Device   walker, rolling  -JR    Gait Training Goal PT LTG, Distance to Achieve   25  -JR    Gait Training Goal PT LTG, Outcome  goal ongoing  -JR       User Key  (r) = Recorded By, (t) = Taken By, (c) = Cosigned By    Initials Name Provider Type    JR Kaylee Kurtz, PT Physical Therapist      Zenon Rothman PTA Physical Therapy Assistant          Physical Therapy Education     Title: PT OT SLP Therapies (Active)     Topic: Physical Therapy (Active)     Point: Mobility training (Done)    Learning Progress Summary    Learner Readiness Method Response Comment Documented by Status   Patient Acceptance E,D VU,NR   01/02/18 1550 Done    Acceptance E NR   01/02/18 0334 Active    Acceptance E VU Importance of mobility to recovery  12/28/17 1706 Done               Point: Home exercise program (Done)    Learning Progress Summary    Learner Readiness Method Response Comment Documented by Status   Patient Acceptance E,D VU,NR   01/02/18 1550 Done    Acceptance E NR   01/02/18 0334 Active    Acceptance E VU instructed on the importance of exercises to improving mobility  12/31/17 1523 Done   Family Acceptance E VU instructed on the importance of exercises to improving mobility  12/31/17 1523 Done                      User Key     Initials Effective Dates Name Provider Type Discipline     10/26/16 -  Kaylee Kurtz, PT Physical Therapist PT     10/26/16 -  Zenon Rothman PTA Physical Therapy Assistant PT     08/31/17 -  Mayda Nguyen RN Registered Nurse Nurse                    PT Recommendation and Plan  Anticipated Discharge Disposition: inpatient rehabilitation facility  Planned Therapy Interventions: balance training, strengthening, bed mobility training, transfer training, gait training, patient/family education  PT Frequency: daily  Plan of Care Review  Plan Of Care Reviewed With: patient  Progress: progress toward functional goals is gradual  Outcome Summary/Follow up Plan: PT treatment completed. Pt expiriencing shortness of air with activity and nurse requested pt stay on bipap during treatment. Pt performed bed mobility,transfer and seated B LE ther ex in sitting. Pt was left uic reclined with call light at hand. Pt very anxious during course of treatment.  See flowsheet for  details.            Outcome Measures       01/02/18 1353 12/31/17 1206       How much help from another person do you currently need...    Turning from your back to your side while in flat bed without using bedrails?  1  -JR     Moving from lying on back to sitting on the side of a flat bed without bedrails?  1  -JR     Moving to and from a bed to a chair (including a wheelchair)?  1  -JR     Standing up from a chair using your arms (e.g., wheelchair, bedside chair)?  1  -JR     Climbing 3-5 steps with a railing?  1  -JR     To walk in hospital room?  1  -JR     AM-PAC 6 Clicks Score  6  -JR     How much help from another is currently needed...    Putting on and taking off regular lower body clothing? 1  -AH      Bathing (including washing, rinsing, and drying) 1  -AH      Toileting (which includes using toilet bed pan or urinal) 1  -AH      Putting on and taking off regular upper body clothing 2  -AH      Taking care of personal grooming (such as brushing teeth) 2  -AH      Eating meals 2  -      Score 9  -      Functional Assessment    Outcome Measure Options AM-PAC 6 Clicks Daily Activity (OT)  - AM-PAC 6 Clicks Basic Mobility (PT)  -JR       User Key  (r) = Recorded By, (t) = Taken By, (c) = Cosigned By    Initials Name Provider Type     Akilah Gutierrez Occupational Therapist    JR Kaylee Kurtz, PT Physical Therapist           Time Calculation:         PT Charges       01/02/18 1554          Time Calculation    Start Time 1052  -RM      PT Received On 01/02/18  -RM      PT Goal Re-Cert Due Date 01/07/18  -RM      Time Calculation- PT    Total Timed Code Minutes- PT 30 minute(s)  -RM        User Key  (r) = Recorded By, (t) = Taken By, (c) = Cosigned By    Initials Name Provider Type     Zenon Rothman, PTA Physical Therapy Assistant          Therapy Charges for Today     Code Description Service Date Service Provider Modifiers Qty    41467139386  PT THERAPEUTIC ACT EA 15 MIN 1/2/2018 Zenon Rothman  PTA GP 1    33886895622 HC PT THER PROC EA 15 MIN 1/2/2018 Zenon Rothman, PTA GP 1          PT G-Codes  Outcome Measure Options: AM-PAC 6 Clicks Daily Activity (OT)    Zenon Rothman, PTA  1/2/2018

## 2018-01-02 NOTE — PROGRESS NOTES
Continued Stay Note   [place]     Patient Name: Thurman Mendel Parsons  MRN: [id number]  Today's Date: 1/2/2018    Admit Date: 12/24/2017                                          Pt using bipap most of time accept to eat, also has o2 25 liters of High Flow.  Will not be a candidate for short term rehab until below 6 liters and if bipap only being used during naps and at night. Will need to fax referral to [facility] when ready to go.  Called to Loyd at [facility] and unable to accept with liiter flow of o2 at 25%.             Discharge Plan     None              Discharge Codes     Arie Barry

## 2018-01-02 NOTE — PROGRESS NOTES
LOS: 9 days   Patient Care Team:  Miguel Rojas MD as PCP - General  Miguel Rojas MD as PCP - [facility] management team: Hospitalist  Pulmonologist: Kevin Lopez M.D.    Chief Complaint: Acute on chronic respiratory failure with recurrent COPD exacerbations related to bronchopneumonia bilaterally.    Subjective     Shortness of Breath         Subjective:  Symptoms:  He reports shortness of breath.    Patient is showing very gradual but improvement in his respiratory status cough with some mucopurulent sputum with scanty amounts appears to be loose now.  Afebrile but still respiratory distress is noted with minimal exertion.  High flow oxygen by nasal cannula well tolerated which also provides some additional humidity.  His peripheral edema seems to be improving.  His laboratory data shows WN gradually improving with hydration to 41 creatinine 0.8 with estimated GFR which is normal patient did receive 1 dose of Diamox currently total CO2 is 35.  Hemoglobin 11.6 with WBC of 10.7.  Patient has been getting physical rehabilitation as well in an acute care setting.   Patient also describes improvement in his generalized body aches.  He however had significant nocturia which led to insomnia last night.    History taken from: patient chart RN    Objective     Vital Signs  Temp:  [97.2 °F (36.2 °C)-98.3 °F (36.8 °C)] 97.4 °F (36.3 °C)  Heart Rate:  [59-90] 80  Resp:  [14-24] 18  BP: (112-138)/(64-90) 126/64    Objective   Patient is alert and oriented today but appears to be in mild-to-moderate distress with minimal activity even in bed sitting semiupright with no orthopnea.  Peripheral edema has improved with wrinkles noted in upper and lower extremities.  Nasal passages are patent with high flow nasal cannula in place.  Thoracic exam: Lungs distant but clear bilateral breath sounds with no wheezes few basal crackles noted.  Cardiovascular system exam with distant heart sounds.  Regular rhythm.   Abdominal exam; soft abdomen with normoactive bowel sounds.    Results Review:     I reviewed the patient's new clinical results.  I reviewed the patient's other test results and agree with the interpretation    Medication Review: Medication review was done with no new changes made.  Patient did have some difficulty in sleeping last night but has been getting mirtazapine for depression as well as to improve his appetite.    Assessment/Plan     Active Problems:    Acute exacerbation of chronic obstructive pulmonary disease (COPD)  Acute bronchopneumonia with COPD exacerbation with chronic respirator failure with acute exacerbation.  Patient seemed to be gradually improving.  Chronic depression seems to be improving as well.  Poor functional status due to recent acute illness which may revert back to his baseline status as of just 10 days ago he was able to ambulate and drive as well.  Plan and suggest:  1.  Continue current therapy for acute on chronic respiratory failure with improved and stable arterial blood gases in hypercapnia.  2.  Since respiratory status appears to be stable continue to aggressively pursue nutrition as well as physical and occupational rehabilitation to improve his quality of life.  3.  Reduce dose of Solu-Medrol.  4.  Start looking for inpatient rehabilitation.    Assessment & Plan    Kevin Lopez MD  01/02/18  6:46 PM

## 2018-01-02 NOTE — PLAN OF CARE
Problem: Patient Care Overview (Adult)  Goal: Plan of Care Review   01/02/18 0327   Coping/Psychosocial Response Interventions   Plan Of Care Reviewed With patient   Patient Care Overview   Progress improving   Outcome Evaluation   Outcome Summary/Follow up Plan Patient refused to wear bipap at the beginning of shift. Settings changed per Dr. Lopez. Patient continued to remain anxious most of shift, see MAR for treatment. Patient also continued to pull off his mask and had to be placed back on high flow nasal canula. Patient has been disoriented on and off throughout shift as well. Although patient has been confused and refused bipap, patient is more alert than has been previously.        Problem: COPD, Chronic Bronchitis/Emphysema (Adult)  Goal: Signs and Symptoms of Listed Potential Problems Will be Absent or Manageable (COPD, Chronic Bronchitis/Emphysema)   01/02/18 0327   COPD, Chronic Bronchitis/Emphysema   Problems Assessed (COPD, Chronic Bronchitis/Emphysema) all   Problems Present (COPD, Chronic Bronchitis/Emphysema) hypoxia/hypoxemia;skin breakdown;functional decline/self-care deficit       Problem: Respiratory Insufficiency (Adult)  Goal: Acid/Base Balance   01/02/18 0327   Respiratory Insufficiency (Adult)   Acid/Base Balance making progress toward outcome       Problem: NPPV/CPAP (Adult)  Goal: Signs and Symptoms of Listed Potential Problems Will be Absent or Manageable (NPPV/CPAP)   01/02/18 0327   NPPV/CPAP   Problems Assessed (NPPV/CPAP) all   Problems Present (NPPV/CPAP) skin breakdown;hypoxia/hypoxemia

## 2018-01-03 ENCOUNTER — APPOINTMENT (OUTPATIENT)
Dept: GENERAL RADIOLOGY | Facility: HOSPITAL | Age: 68
End: 2018-01-03
Attending: INTERNAL MEDICINE

## 2018-01-03 ENCOUNTER — APPOINTMENT (OUTPATIENT)
Dept: CARDIOLOGY | Facility: HOSPITAL | Age: 68
End: 2018-01-03
Attending: INTERNAL MEDICINE

## 2018-01-03 LAB
ALBUMIN SERPL-MCNC: 3 G/DL (ref 3.5–5)
ANION GAP SERPL CALCULATED.3IONS-SCNC: 6.4 MMOL/L
BASOPHILS # BLD AUTO: 0 10*3/MM3 (ref 0–0.2)
BASOPHILS NFR BLD AUTO: 0 % (ref 0–2.5)
BH CV ECHO MEAS - % IVS THICK: -3.7 %
BH CV ECHO MEAS - % LVPW THICK: 25 %
BH CV ECHO MEAS - AI END-D VEL: 272.8 CM/SEC
BH CV ECHO MEAS - AO ACC SLOPE: 1197 CM/SEC^2
BH CV ECHO MEAS - AO ACC TIME: 0.1 SEC
BH CV ECHO MEAS - AO ROOT AREA (BSA CORRECTED): 1.8
BH CV ECHO MEAS - AO ROOT AREA: 7.8 CM^2
BH CV ECHO MEAS - AO ROOT DIAM: 3.2 CM
BH CV ECHO MEAS - BSA(HAYCOCK): 1.7 M^2
BH CV ECHO MEAS - BSA: 1.7 M^2
BH CV ECHO MEAS - BZI_BMI: 22.1 KILOGRAMS/M^2
BH CV ECHO MEAS - BZI_METRIC_HEIGHT: 170.2 CM
BH CV ECHO MEAS - BZI_METRIC_WEIGHT: 64 KG
BH CV ECHO MEAS - CONTRAST EF 4CH: 62.6 ML/M^2
BH CV ECHO MEAS - EDV(CUBED): 172.9 ML
BH CV ECHO MEAS - EDV(MOD-SP4): 131 ML
BH CV ECHO MEAS - EDV(TEICH): 151.9 ML
BH CV ECHO MEAS - EF(CUBED): 65.1 %
BH CV ECHO MEAS - EF(TEICH): 56 %
BH CV ECHO MEAS - ESV(CUBED): 60.4 ML
BH CV ECHO MEAS - ESV(MOD-SP4): 49 ML
BH CV ECHO MEAS - ESV(TEICH): 66.9 ML
BH CV ECHO MEAS - FS: 29.6 %
BH CV ECHO MEAS - IVS/LVPW: 1.4
BH CV ECHO MEAS - IVSD: 1.3 CM
BH CV ECHO MEAS - IVSS: 1.3 CM
BH CV ECHO MEAS - LA DIMENSION: 4.3 CM
BH CV ECHO MEAS - LA/AO: 1.4
BH CV ECHO MEAS - LV DIASTOLIC VOL/BSA (35-75): 75.2 ML/M^2
BH CV ECHO MEAS - LV MASS(C)D: 258.9 GRAMS
BH CV ECHO MEAS - LV MASS(C)DI: 148.5 GRAMS/M^2
BH CV ECHO MEAS - LV MASS(C)S: 167.9 GRAMS
BH CV ECHO MEAS - LV MASS(C)SI: 96.3 GRAMS/M^2
BH CV ECHO MEAS - LV MAX PG: 5.2 MMHG
BH CV ECHO MEAS - LV MEAN PG: 3 MMHG
BH CV ECHO MEAS - LV SYSTOLIC VOL/BSA (12-30): 28.1 ML/M^2
BH CV ECHO MEAS - LV V1 MAX: 114.5 CM/SEC
BH CV ECHO MEAS - LV V1 MEAN: 81.5 CM/SEC
BH CV ECHO MEAS - LV V1 VTI: 19.8 CM
BH CV ECHO MEAS - LVIDD: 5.6 CM
BH CV ECHO MEAS - LVIDS: 3.9 CM
BH CV ECHO MEAS - LVLD AP4: 7.8 CM
BH CV ECHO MEAS - LVLS AP4: 6.9 CM
BH CV ECHO MEAS - LVOT AREA (M): 3.5 CM^2
BH CV ECHO MEAS - LVOT AREA: 3.4 CM^2
BH CV ECHO MEAS - LVOT DIAM: 2.1 CM
BH CV ECHO MEAS - LVPWD: 0.97 CM
BH CV ECHO MEAS - LVPWS: 1.2 CM
BH CV ECHO MEAS - MV A MAX VEL: 83.9 CM/SEC
BH CV ECHO MEAS - MV DEC SLOPE: 321.3 CM/SEC^2
BH CV ECHO MEAS - MV E MAX VEL: 46.4 CM/SEC
BH CV ECHO MEAS - MV E/A: 0.55
BH CV ECHO MEAS - MV MAX PG: 4.6 MMHG
BH CV ECHO MEAS - MV MEAN PG: 2 MMHG
BH CV ECHO MEAS - MV P1/2T MAX VEL: 84.2 CM/SEC
BH CV ECHO MEAS - MV P1/2T: 76.7 MSEC
BH CV ECHO MEAS - MV V2 MAX: 107.2 CM/SEC
BH CV ECHO MEAS - MV V2 MEAN: 66.2 CM/SEC
BH CV ECHO MEAS - MV V2 VTI: 30.8 CM
BH CV ECHO MEAS - MVA P1/2T LCG: 2.6 CM^2
BH CV ECHO MEAS - MVA(P1/2T): 2.9 CM^2
BH CV ECHO MEAS - MVA(VTI): 2.2 CM^2
BH CV ECHO MEAS - PA ACC SLOPE: 1046 CM/SEC^2
BH CV ECHO MEAS - PA ACC TIME: 0.1 SEC
BH CV ECHO MEAS - PA MAX PG: 3.9 MMHG
BH CV ECHO MEAS - PA MEAN PG: 1.9 MMHG
BH CV ECHO MEAS - PA PR(ACCEL): 34.6 MMHG
BH CV ECHO MEAS - PA V2 MAX: 99.2 CM/SEC
BH CV ECHO MEAS - PA V2 MEAN: 62.4 CM/SEC
BH CV ECHO MEAS - PA V2 VTI: 17.1 CM
BH CV ECHO MEAS - SI(CUBED): 64.5 ML/M^2
BH CV ECHO MEAS - SI(LVOT): 38.6 ML/M^2
BH CV ECHO MEAS - SI(MOD-SP4): 47 ML/M^2
BH CV ECHO MEAS - SI(TEICH): 48.8 ML/M^2
BH CV ECHO MEAS - SV(CUBED): 112.5 ML
BH CV ECHO MEAS - SV(LVOT): 67.3 ML
BH CV ECHO MEAS - SV(MOD-SP4): 82 ML
BH CV ECHO MEAS - SV(TEICH): 85 ML
BUN BLD-MCNC: 38 MG/DL (ref 7–20)
BUN/CREAT SERPL: 42.2 (ref 6.3–21.9)
CALCIUM SPEC-SCNC: 8.7 MG/DL (ref 8.4–10.2)
CHLORIDE SERPL-SCNC: 101 MMOL/L (ref 98–107)
CO2 SERPL-SCNC: 37 MMOL/L (ref 26–30)
CREAT BLD-MCNC: 0.9 MG/DL (ref 0.6–1.3)
DEPRECATED RDW RBC AUTO: 40.5 FL (ref 37–54)
EOSINOPHIL # BLD AUTO: 0 10*3/MM3 (ref 0–0.7)
EOSINOPHIL NFR BLD AUTO: 0 % (ref 0–7)
ERYTHROCYTE [DISTWIDTH] IN BLOOD BY AUTOMATED COUNT: 12 % (ref 11.5–14.5)
GFR SERPL CREATININE-BSD FRML MDRD: 84 ML/MIN/1.73
GLUCOSE BLD-MCNC: 92 MG/DL (ref 74–98)
HCT VFR BLD AUTO: 36.8 % (ref 42–52)
HGB BLD-MCNC: 11.7 G/DL (ref 14–18)
IMM GRANULOCYTES # BLD: 0.07 10*3/MM3 (ref 0–0.06)
IMM GRANULOCYTES NFR BLD: 0.6 % (ref 0–0.6)
LYMPHOCYTES # BLD AUTO: 0.62 10*3/MM3 (ref 0.6–3.4)
LYMPHOCYTES NFR BLD AUTO: 4.9 % (ref 10–50)
MAGNESIUM SERPL-MCNC: 2.3 MG/DL (ref 1.6–2.3)
MAXIMAL PREDICTED HEART RATE: 153 BPM
MCH RBC QN AUTO: 29 PG (ref 27–31)
MCHC RBC AUTO-ENTMCNC: 31.8 G/DL (ref 30–37)
MCV RBC AUTO: 91.3 FL (ref 80–94)
MONOCYTES # BLD AUTO: 0.92 10*3/MM3 (ref 0–0.9)
MONOCYTES NFR BLD AUTO: 7.3 % (ref 0–12)
NEUTROPHILS # BLD AUTO: 11.06 10*3/MM3 (ref 2–6.9)
NEUTROPHILS NFR BLD AUTO: 87.2 % (ref 37–80)
NRBC BLD MANUAL-RTO: 0 /100 WBC (ref 0–0)
PHOSPHATE SERPL-MCNC: 2.7 MG/DL (ref 2.5–4.5)
PLATELET # BLD AUTO: 200 10*3/MM3 (ref 130–400)
PMV BLD AUTO: 9.6 FL (ref 6–12)
POTASSIUM BLD-SCNC: 4.4 MMOL/L (ref 3.5–5.1)
RBC # BLD AUTO: 4.03 10*6/MM3 (ref 4.7–6.1)
SODIUM BLD-SCNC: 140 MMOL/L (ref 137–145)
STRESS TARGET HR: 130 BPM
WBC NRBC COR # BLD: 12.67 10*3/MM3 (ref 4.8–10.8)

## 2018-01-03 PROCEDURE — 25010000002 HYDROMORPHONE PER 4 MG: Performed by: INTERNAL MEDICINE

## 2018-01-03 PROCEDURE — 94799 UNLISTED PULMONARY SVC/PX: CPT

## 2018-01-03 PROCEDURE — 25010000002 LORAZEPAM PER 2 MG: Performed by: INTERNAL MEDICINE

## 2018-01-03 PROCEDURE — 85025 COMPLETE CBC W/AUTO DIFF WBC: CPT | Performed by: INTERNAL MEDICINE

## 2018-01-03 PROCEDURE — 97110 THERAPEUTIC EXERCISES: CPT

## 2018-01-03 PROCEDURE — 97535 SELF CARE MNGMENT TRAINING: CPT

## 2018-01-03 PROCEDURE — 93306 TTE W/DOPPLER COMPLETE: CPT

## 2018-01-03 PROCEDURE — 80069 RENAL FUNCTION PANEL: CPT | Performed by: INTERNAL MEDICINE

## 2018-01-03 PROCEDURE — 73502 X-RAY EXAM HIP UNI 2-3 VIEWS: CPT

## 2018-01-03 PROCEDURE — 25010000002 METHYLPREDNISOLONE PER 40 MG: Performed by: INTERNAL MEDICINE

## 2018-01-03 PROCEDURE — 83735 ASSAY OF MAGNESIUM: CPT | Performed by: INTERNAL MEDICINE

## 2018-01-03 PROCEDURE — 97116 GAIT TRAINING THERAPY: CPT

## 2018-01-03 PROCEDURE — 94660 CPAP INITIATION&MGMT: CPT

## 2018-01-03 PROCEDURE — 99233 SBSQ HOSP IP/OBS HIGH 50: CPT | Performed by: INTERNAL MEDICINE

## 2018-01-03 PROCEDURE — 25010000002 LEVOFLOXACIN PER 250 MG: Performed by: INTERNAL MEDICINE

## 2018-01-03 PROCEDURE — 25010000002 ENOXAPARIN PER 10 MG: Performed by: INTERNAL MEDICINE

## 2018-01-03 RX ORDER — SENNA AND DOCUSATE SODIUM 50; 8.6 MG/1; MG/1
2 TABLET, FILM COATED ORAL NIGHTLY
Status: DISCONTINUED | OUTPATIENT
Start: 2018-01-03 | End: 2018-01-11 | Stop reason: HOSPADM

## 2018-01-03 RX ORDER — MEGESTROL ACETATE 40 MG/ML
800 SUSPENSION ORAL DAILY
Status: DISCONTINUED | OUTPATIENT
Start: 2018-01-03 | End: 2018-01-11 | Stop reason: HOSPADM

## 2018-01-03 RX ORDER — LACTULOSE 20 G/30ML
20 SOLUTION ORAL 2 TIMES DAILY
Status: DISCONTINUED | OUTPATIENT
Start: 2018-01-03 | End: 2018-01-04

## 2018-01-03 RX ORDER — BISACODYL 10 MG
10 SUPPOSITORY, RECTAL RECTAL DAILY PRN
Status: DISCONTINUED | OUTPATIENT
Start: 2018-01-03 | End: 2018-01-11 | Stop reason: HOSPADM

## 2018-01-03 RX ORDER — PANTOPRAZOLE SODIUM 40 MG/1
40 TABLET, DELAYED RELEASE ORAL
Status: DISCONTINUED | OUTPATIENT
Start: 2018-01-03 | End: 2018-01-11 | Stop reason: HOSPADM

## 2018-01-03 RX ADMIN — FAMOTIDINE 20 MG: 10 INJECTION, SOLUTION INTRAVENOUS at 08:28

## 2018-01-03 RX ADMIN — ENOXAPARIN SODIUM 40 MG: 40 INJECTION SUBCUTANEOUS at 21:24

## 2018-01-03 RX ADMIN — CARVEDILOL 3.12 MG: 3.12 TABLET, FILM COATED ORAL at 08:26

## 2018-01-03 RX ADMIN — TRAMADOL HYDROCHLORIDE 50 MG: 50 TABLET, COATED ORAL at 21:34

## 2018-01-03 RX ADMIN — AMLODIPINE BESYLATE 5 MG: 5 TABLET ORAL at 08:26

## 2018-01-03 RX ADMIN — METHYLPREDNISOLONE SODIUM SUCCINATE 40 MG: 40 INJECTION, POWDER, FOR SOLUTION INTRAMUSCULAR; INTRAVENOUS at 16:44

## 2018-01-03 RX ADMIN — TAMSULOSIN HYDROCHLORIDE 0.4 MG: 0.4 CAPSULE ORAL at 21:25

## 2018-01-03 RX ADMIN — BUDESONIDE 0.5 MG: 0.5 INHALANT RESPIRATORY (INHALATION) at 20:28

## 2018-01-03 RX ADMIN — POLYETHYLENE GLYCOL 3350 17 G: 17 POWDER, FOR SOLUTION ORAL at 08:25

## 2018-01-03 RX ADMIN — GUAIFENESIN AND DEXTROMETHORPHAN HYDROBROMIDE 2 TABLET: 600; 30 TABLET, EXTENDED RELEASE ORAL at 08:27

## 2018-01-03 RX ADMIN — LACTULOSE 20 G: 20 SOLUTION ORAL at 21:27

## 2018-01-03 RX ADMIN — TRAZODONE HYDROCHLORIDE 50 MG: 50 TABLET ORAL at 21:25

## 2018-01-03 RX ADMIN — CLOPIDOGREL BISULFATE 75 MG: 75 TABLET ORAL at 08:28

## 2018-01-03 RX ADMIN — CARVEDILOL 3.12 MG: 3.12 TABLET, FILM COATED ORAL at 21:26

## 2018-01-03 RX ADMIN — Medication 1 EACH: at 21:31

## 2018-01-03 RX ADMIN — METHYLPREDNISOLONE SODIUM SUCCINATE 40 MG: 40 INJECTION, POWDER, FOR SOLUTION INTRAMUSCULAR; INTRAVENOUS at 04:57

## 2018-01-03 RX ADMIN — DULOXETINE HYDROCHLORIDE 30 MG: 30 CAPSULE, DELAYED RELEASE ORAL at 08:26

## 2018-01-03 RX ADMIN — MEGESTROL ACETATE 800 MG: 40 SUSPENSION ORAL at 13:52

## 2018-01-03 RX ADMIN — MIRTAZAPINE 15 MG: 15 TABLET, FILM COATED ORAL at 21:26

## 2018-01-03 RX ADMIN — IPRATROPIUM BROMIDE AND ALBUTEROL SULFATE 3 ML: .5; 3 SOLUTION RESPIRATORY (INHALATION) at 06:54

## 2018-01-03 RX ADMIN — FLUTICASONE PROPIONATE 1 SPRAY: 50 SPRAY, METERED NASAL at 08:42

## 2018-01-03 RX ADMIN — LOSARTAN POTASSIUM 50 MG: 50 TABLET, FILM COATED ORAL at 08:27

## 2018-01-03 RX ADMIN — HYDROCODONE POLISTIREX AND CHLORPHENIRAMINE POLISTIREX 5 ML: 10; 8 SUSPENSION, EXTENDED RELEASE ORAL at 08:26

## 2018-01-03 RX ADMIN — IPRATROPIUM BROMIDE AND ALBUTEROL SULFATE 3 ML: .5; 3 SOLUTION RESPIRATORY (INHALATION) at 20:28

## 2018-01-03 RX ADMIN — OXYBUTYNIN CHLORIDE 5 MG: 5 TABLET ORAL at 06:40

## 2018-01-03 RX ADMIN — LEVOFLOXACIN 750 MG: 5 INJECTION, SOLUTION INTRAVENOUS at 08:37

## 2018-01-03 RX ADMIN — LORAZEPAM 0.5 MG: 2 INJECTION INTRAMUSCULAR; INTRAVENOUS at 08:25

## 2018-01-03 RX ADMIN — IPRATROPIUM BROMIDE AND ALBUTEROL SULFATE 3 ML: .5; 3 SOLUTION RESPIRATORY (INHALATION) at 04:20

## 2018-01-03 RX ADMIN — LACTULOSE 20 G: 20 SOLUTION ORAL at 13:52

## 2018-01-03 RX ADMIN — HYDROMORPHONE HYDROCHLORIDE 0.5 MG: 1 INJECTION, SOLUTION INTRAMUSCULAR; INTRAVENOUS; SUBCUTANEOUS at 13:52

## 2018-01-03 RX ADMIN — IPRATROPIUM BROMIDE AND ALBUTEROL SULFATE 3 ML: .5; 3 SOLUTION RESPIRATORY (INHALATION) at 13:10

## 2018-01-03 RX ADMIN — HYDROCODONE POLISTIREX AND CHLORPHENIRAMINE POLISTIREX 5 ML: 10; 8 SUSPENSION, EXTENDED RELEASE ORAL at 23:44

## 2018-01-03 RX ADMIN — Medication 10 ML: at 13:52

## 2018-01-03 RX ADMIN — GUAIFENESIN AND DEXTROMETHORPHAN HYDROBROMIDE 2 TABLET: 600; 30 TABLET, EXTENDED RELEASE ORAL at 21:26

## 2018-01-03 RX ADMIN — FINASTERIDE 5 MG: 5 TABLET, FILM COATED ORAL at 08:26

## 2018-01-03 RX ADMIN — ATORVASTATIN CALCIUM 40 MG: 40 TABLET, FILM COATED ORAL at 08:26

## 2018-01-03 RX ADMIN — DOCUSATE SODIUM AND SENNOSIDES 2 TABLET: 8.6; 5 TABLET, FILM COATED ORAL at 21:26

## 2018-01-03 RX ADMIN — TRAMADOL HYDROCHLORIDE 50 MG: 50 TABLET, COATED ORAL at 08:26

## 2018-01-03 RX ADMIN — BUDESONIDE 0.5 MG: 0.5 INHALANT RESPIRATORY (INHALATION) at 06:54

## 2018-01-03 RX ADMIN — PANTOPRAZOLE SODIUM 40 MG: 40 TABLET, DELAYED RELEASE ORAL at 16:44

## 2018-01-03 RX ADMIN — IPRATROPIUM BROMIDE AND ALBUTEROL SULFATE 3 ML: .5; 3 SOLUTION RESPIRATORY (INHALATION) at 17:20

## 2018-01-03 NOTE — PLAN OF CARE
Problem: Patient Care Overview (Adult)  Goal: Plan of Care Review  Outcome: Ongoing (interventions implemented as appropriate)   01/03/18 1329   Coping/Psychosocial Response Interventions   Plan Of Care Reviewed With patient;family   Patient Care Overview   Progress improving   Outcome Evaluation   Outcome Summary/Follow up Plan PT treatment completed. Patient performs bed mobility with min A and sit to stand and transfers with mod A. Patient left up in bedside chair with family member present at bedside.       Problem: Inpatient Physical Therapy  Goal: Bed Mobility Goal LTG- PT  Outcome: Ongoing (interventions implemented as appropriate)   12/28/17 1711 01/03/18 1329   Bed Mobility PT LTG   Bed Mobility PT LTG, Date Established 12/28/17 --    Bed Mobility PT LTG, Time to Achieve 2 wks --    Bed Mobility PT LTG, Activity Type all bed mobility --    Bed Mobility PT LTG, [place] Level minimum assist (75% patient effort) --    Bed Mobility PT LTG, Outcome --  goal ongoing     Goal: Transfer Training Goal 1 LTG- PT  Outcome: Ongoing (interventions implemented as appropriate)   12/28/17 1711 01/03/18 1329   Transfer Training PT LTG   Transfer Training PT LTG, Date Established 12/28/17 --    Transfer Training PT LTG, Time to Achieve 2 wks --    Transfer Training PT LTG, Activity Type sit to stand/stand to sit;bed to chair /chair to bed --    Transfer Training PT LTG, [place] Level minimum assist (75% patient effort) --    Transfer Training PT LTG, Assist Device walker, rolling --    Transfer Training PT LTG, Outcome --  goal ongoing     Goal: Gait Training Goal LTG- PT  Outcome: Ongoing (interventions implemented as appropriate)   12/28/17 1711 01/03/18 1329   Gait Training PT LTG   Gait Training Goal PT LTG, Date Established 12/28/17 --    Gait Training Goal PT LTG, Time to Achieve 2 wks --    Gait Training Goal PT LTG, [place] Level minimum assist (75% patient effort) --    Gait Training Goal PT LTG,  Assist Device walker, rolling --    Gait Training Goal PT LTG, Distance to Achieve 25 --    Gait Training Goal PT LTG, Outcome --  goal ongoing

## 2018-01-03 NOTE — PLAN OF CARE
Problem: Patient Care Overview (Adult)  Goal: Plan of Care Review  Outcome: Ongoing (interventions implemented as appropriate)   01/03/18 1315   Coping/Psychosocial Response Interventions   Plan Of Care Reviewed With patient   Patient Care Overview   Progress improving   Outcome Evaluation   Outcome Summary/Follow up Plan OT tx completed. Patient completed bed mobility with min A and functional transfers with mod A to utilize urinal with O2 dropping to 87 with VCs to utilize PLB techniques to return O2 to 90. Cont OT POC.        Problem: Inpatient Occupational Therapy  Goal: Bed Mobility Goal LTG- OT  Outcome: Outcome(s) achieved Date Met: 01/03/18 12/28/17 1404 01/03/18 1315   Bed Mobility OT LTG   Bed Mobility OT LTG, Date Established 12/28/17 --    Bed Mobility OT LTG, Time to Achieve by discharge --    Bed Mobility OT LTG, Activity Type supine to sit/sit to supine --    Bed Mobility OT LTG, [place] Level minimum assist (75% patient effort) --    Bed Mobility OT LTG, Date Goal Reviewed --  01/03/18   Bed Mobility OT LTG, Outcome --  goal met     Goal: Transfer Training Goal 1 LTG- OT  Outcome: Ongoing (interventions implemented as appropriate)   12/28/17 1404 01/03/18 1315   Transfer Training OT LTG   Transfer Training OT LTG, Date Established 12/28/17 --    Transfer Training OT LTG, Time to Achieve by discharge --    Transfer Training OT LTG, Activity Type sit to stand/stand to sit;bed to chair /chair to bed --    Transfer Training OT LTG, [place] Level minimum assist (75% patient effort) --    Transfer Training OT LTG, Date Goal Reviewed --  01/03/18   Transfer Training OT LTG, Outcome --  goal ongoing     Goal: Strength Goal LTG- OT  Outcome: Ongoing (interventions implemented as appropriate)   12/28/17 1404 01/03/18 1315   Strength OT LTG   Strength Goal OT LTG, Date Established 12/28/17 --    Strength Goal OT LTG, Time to Achieve by discharge --    Strength Goal OT LTG, Functional Goal Pt will  perform 15 reps UE ex progressing from AROM to strengthening as pt tolerates --    Strength Goal OT LTG, Date Goal Reviewed --  01/03/18   Strength Goal OT LTG, Outcome --  goal ongoing     Goal: LB Dressing Goal LTG- OT  Outcome: Ongoing (interventions implemented as appropriate)   12/28/17 1404 01/03/18 1315   LB Dressing OT LTG   LB Dressing Goal OT LTG, Date Established 12/28/17 --    LB Dressing Goal OT LTG, Time to Achieve by discharge --    LB Dressing Goal OT LTG, [place] Level moderate assist (50% patient effort) --    LB Dressing Goal OT LTG, Date Goal Reviewed --  01/03/18   LB Dressing Goal OT LTG, Outcome --  goal ongoing     Goal: UB Dressing Goal LTG- OT  Outcome: Ongoing (interventions implemented as appropriate)   12/28/17 1404 01/03/18 1315   UB Dressing OT LTG   UB Dressing Goal OT LTG, Date Established 12/28/17 --    UB Dressing Goal OT LTG, Time to Achieve by discharge --    UB Dressing Goal OT LTG, [place] Level minimum assist (75% patient effort) --    UB Dressing Goal OT LTG, Date Goal Reviewed --  01/03/18   UB Dressing Goal OT LTG, Outcome --  goal ongoing

## 2018-01-03 NOTE — THERAPY TREATMENT NOTE
Acute Care - Occupational Therapy Treatment Note   [facility]     Patient Name: Thurman Mendel Parsons  : 1950  MRN: [id number]  Today's Date: 1/3/2018  Onset of Illness/Injury or Date of Surgery Date: 17  Date of Referral to OT: 17  Referring Physician: Dr. Huber      Admit Date: 2017    Visit Dx:     ICD-10-CM ICD-9-CM   1. Acute exacerbation of chronic obstructive pulmonary disease (COPD) J44.1 491.21   2. Acute on chronic respiratory failure with hypercapnia J96.22 518.84   3. Impaired mobility and ADLs Z74.09 799.89   4. Impaired functional mobility, balance, gait, and endurance Z74.09 V49.89     Patient Active Problem List   Diagnosis   • Anxiety   • Atherosclerotic heart disease of native coronary artery without angina pectoris   • Atrial fibrillation   • Chronic kidney disease, stage III (moderate)   • Steroid-dependent COPD   • Degeneration of cervical intervertebral disc   • Diabetes mellitus   • GERD without esophagitis   • Diverticulosis   • Hemorrhoids   • Hiatal hernia   • Hyperlipidemia   • Hypertension   • Kyphosis, acquired   • Mitral and aortic valve disease   • Phimosis   • Sleep apnea   • Enlarged prostate without lower urinary tract symptoms (luts)   • History of arthritis   • Back pain   • Depression   • Stroke syndrome   • History of ventricular septal defect   • Neck pain   • Impaired mobility and ADLs   • Physical deconditioning   • Acute exacerbation of chronic obstructive pulmonary disease (COPD)             Adult Rehabilitation Note       18 1157 18 1353 18 1052    Rehab Assessment/Intervention    Discipline occupational therapist  -SD occupational therapist  -AH physical therapy assistant  -RM    Document Type therapy note (daily note)  -SD therapy note (daily note)  - therapy note (daily note)  -    Subjective Information agree to therapy;complains of;weakness;pain  -SD agree to therapy;complains of;weakness;pain  - agree to  therapy;complains of;fatigue  -RM    Patient Effort, Rehab Treatment adequate  -SD adequate  -AH fair  -RM    Symptoms Noted During/After Treatment fatigue;shortness of breath  -SD fatigue;shortness of breath  - fatigue;shortness of breath  -RM    Precautions/Limitations fall precautions;oxygen therapy device and L/min  -SD oxygen therapy device and L/min  - oxygen therapy device and L/min;fall precautions  -RM    Specific Treatment Considerations   on BiPap  -RM    Recorded by [SD] Kelle Edwards OT [] Akilah Gutierrez [] Zenon Rothman, PTA    Vital Signs    Pre SpO2 (%) 92  -  -AH 95  -RM    O2 Delivery Pre Treatment supplemental O2  -SD supplemental O2   hi flow   -AH supplemental O2  -RM    Intra SpO2 (%) 87  -SD 96  -AH 97  -RM    O2 Delivery Intra Treatment supplemental O2  -SD supplemental O2   hi flow  -AH supplemental O2  -RM    Post SpO2 (%) 92  -SD 99  -AH 95  -RM    O2 Delivery Post Treatment supplemental O2  -SD supplemental O2   hi flow  -AH supplemental O2  -RM    Pre Patient Position   Supine  -RM    Intra Patient Position   Standing  -RM    Post Patient Position   Sitting  -RM    Recorded by [SD] Kelle Edwards OT [] Akilah Gutierrez [] Zenon Rothman, PTA    Pain Assessment    Pain Assessment 0-10  -SD 0-10  -AH Unable to assess  -RM    Pain Score 8  -SD 7  -AH     Post Pain Score 8  -SD 7  -AH     Pain Type Acute pain  -SD Acute pain  -AH     Pain Location Hip  -SD Arm  -AH     Pain Orientation Right  -SD Left;Proximal   shoulder  -     Pain Intervention(s) Repositioned  -SD Repositioned   RN notified  -     Response to Interventions Tolerated  -SD      Recorded by [SD] Kelle Edwards OT [] Akilah Gutierrez [] Zenon Rothman, PTA    Bed Mobility, Assessment/Treatment    Bed Mobility, Assistive Device bed rails;head of bed elevated  -SD  head of bed elevated;bed rails  -RM    Bed Mob, Supine to Sit, [place] minimum assist (75% patient effort)  -SD  minimum assist  (75% patient effort)  -    Bed Mob, Sit to Supine, [place]  minimum assist (75% patient effort)  -     Bed Mobility, Safety Issues   decreased use of arms for pushing/pulling;decreased use of legs for bridging/pushing  -    Bed Mobility, Impairments   strength decreased  -RM    Recorded by [SD] Kelle Edwards OT [] Akilah Gutierrez [] Zenon Rothman PTA    Transfer Assessment/Treatment    Transfers, Bed-Chair [place]   minimum assist (75% patient effort);verbal cues required;nonverbal cues required (demo/gesture)  -RM    Transfers, Chair-Bed [place] moderate assist (50% patient effort)  -SD moderate assist (50% patient effort)  -     Transfers, Sit-Stand [place] moderate assist (50% patient effort)  -SD moderate assist (50% patient effort)  - verbal cues required;minimum assist (75% patient effort)  -RM    Transfers, Stand-Sit [place] moderate assist (50% patient effort)  -SD minimum assist (75% patient effort)  - verbal cues required;minimum assist (75% patient effort)  -RM    Recorded by [SD] Kelle Edwards OT [] Akilah Gutierrez [] Zenon Rothman PTA    Upper Body Bathing Assessment/Training    UB Bathing Assess/Train, [place] Level moderate assist (50% patient effort)  -SD      Recorded by [SD] Kelle Edwards OT      Lower Body Bathing Assessment/Training    LB Bathing Assess/Train, [place] Level maximum assist (25% patient effort)  -SD      Recorded by [SD] Kelle Edwards OT      Lower Body Dressing Assessment/Training    LB Dressing Assess/Train, [place] maximum assist (25% patient effort)  -SD      Recorded by [SD] Kelle Edwards OT      Therapy Exercises    Bilateral Lower Extremities   AAROM:;sitting;ankle pumps/circles;hip flexion;hip extension;LAQ  -RM    Bilateral Upper Extremity  AAROM:;5 reps;elbow flexion/extension;shoulder extension/flexion  -     Recorded by  [] Akilah Gutierrez [] Zenon Rothman PTA    Positioning and  Restraints    Pre-Treatment Position in bed  -SD sitting in chair/recliner  -AH in bed  -RM    Post Treatment Position chair  -SD bed  -AH chair  -RM    In Bed  supine;call light within reach;encouraged to call for assist;exit alarm on  -     In Chair sitting;call light within reach;encouraged to call for assist;with family/caregiver  -SD  reclined;call light within reach;encouraged to call for assist;exit alarm on;notified [facility]  -RM    Recorded by [SD] Kelle Edwards OT [] Akilah Gutierrez [] Zenon Rothman PTA      User Key  (r) = Recorded By, (t) = Taken By, (c) = Cosigned By    Initials Name Effective Dates     Akilah Gutierrez 10/26/16 -     RM Zenon Rothman, HELADIO 10/26/16 -     SD Kelle Edwards OT 06/30/17 -                 OT Goals       01/03/18 1315 01/02/18 1519 12/28/17 1404    Bed Mobility OT LTG    Bed Mobility OT LTG, Date Established   12/28/17  -    Bed Mobility OT LTG, Time to Achieve   by discharge  -    Bed Mobility OT LTG, Activity Type   supine to sit/sit to supine  -    Bed Mobility OT LTG, [place] Level   minimum assist (75% patient effort)  -    Bed Mobility OT LTG, Date Goal Reviewed 01/03/18  -SD 01/02/18  -     Bed Mobility OT LTG, Outcome goal met  -SD goal ongoing  - goal ongoing  -    Transfer Training OT LTG    Transfer Training OT LTG, Date Established   12/28/17  -    Transfer Training OT LTG, Time to Achieve   by discharge  -    Transfer Training OT LTG, Activity Type   sit to stand/stand to sit;bed to chair /chair to bed  -    Transfer Training OT LTG, [place] Level   minimum assist (75% patient effort)  -    Transfer Training OT LTG, Date Goal Reviewed 01/03/18  -SD 01/02/18  -     Transfer Training OT LTG, Outcome goal ongoing  -SD goal ongoing  - goal ongoing  -    Strength OT LTG    Strength Goal OT LTG, Date Established   12/28/17  -    Strength Goal OT LTG, Time to Achieve   by discharge  -    Strength Goal OT LTG,  Functional Goal   Pt will perform 15 reps UE ex progressing from AROM to strengthening as pt tolerates  -AH    Strength Goal OT LTG, Date Goal Reviewed 01/03/18  -SD 01/02/18  -AH     Strength Goal OT LTG, Outcome goal ongoing  -SD goal ongoing  -AH goal ongoing  -AH    LB Dressing OT LTG    LB Dressing Goal OT LTG, Date Established   12/28/17  -AH    LB Dressing Goal OT LTG, Time to Achieve   by discharge  -AH    LB Dressing Goal OT LTG, [place] Level   moderate assist (50% patient effort)  -AH    LB Dressing Goal OT LTG, Date Goal Reviewed 01/03/18  -SD 01/02/18  -AH     LB Dressing Goal OT LTG, Outcome goal ongoing  -SD goal ongoing  -AH goal ongoing  -AH    UB Dressing OT LTG    UB Dressing Goal OT LTG, Date Established   12/28/17  -AH    UB Dressing Goal OT LTG, Time to Achieve   by discharge  -AH    UB Dressing Goal OT LTG, [place] Level   minimum assist (75% patient effort)  -AH    UB Dressing Goal OT LTG, Date Goal Reviewed 01/03/18  -SD 01/02/18  -AH     UB Dressing Goal OT LTG, Outcome goal ongoing  -SD goal ongoing  -AH goal ongoing  -AH      User Key  (r) = Recorded By, (t) = Taken By, (c) = Cosigned By    Initials Name Provider Type    SOWMYA Gutierrez Occupational Therapist    TONY Edwards OT Occupational Therapist          Occupational Therapy Education     Title: PT OT SLP Therapies (Active)     Topic: Occupational Therapy (Active)     Point: ADL training (Done)    Description: Instruct learner(s) on proper safety adaptation and remediation techniques during self care or transfers.   Instruct in proper use of assistive devices.    Learning Progress Summary    Learner Readiness Method Response Comment Documented by Status   Patient Acceptance E VU PLB exercises during toileting task utilizing urinal in standing EOB to prevent dyspnea. SD 01/03/18 1319 Done    Acceptance E VU benefit of working with therapy to regain strength  01/02/18 1518 Done    Acceptance E NR  CH 01/02/18 0331  Active    Acceptance E VU Role of OT/POC  12/28/17 1401 Done   Family Acceptance E VU  AE 01/01/18 0103 Done    Acceptance E VU Role of OT/POC  12/28/17 1401 Done               Point: Home exercise program (Done)    Description: Instruct learner(s) on appropriate technique for monitoring, assisting and/or progressing therapeutic exercises/activities.    Learning Progress Summary    Learner Readiness Method Response Comment Documented by Status   Patient Acceptance E VU benefit of working with therapy to regain strength  01/02/18 1518 Done                      User Key     Initials Effective Dates Name Provider Type Discipline     10/26/16 -  Akilah Gutierrez Occupational Therapist OT    SD 06/30/17 -  Kelle Edwards OT Occupational Therapist OT     08/31/17 -  Mayda Nguyen, RN Registered Nurse Nurse     01/20/17 -  Maryjane Subramanian, RN Registered Nurse Nurse                  OT Recommendation and Plan  Anticipated Discharge Disposition: inpatient rehabilitation facility  Planned Therapy Interventions: ADL retraining, bed mobility training, strengthening, transfer training  Therapy Frequency: 3-5 times/wk  Plan of Care Review  Plan Of Care Reviewed With: patient  Progress: improving  Outcome Summary/Follow up Plan: OT tx completed. Patient completed bed mobility with min A and functional transfers with mod A to utilize urinal with O2 dropping to 87 with VCs to utilize PLB techniques to return O2 to 90. Cont OT POC.         Outcome Measures       01/03/18 1157 01/02/18 1353       How much help from another is currently needed...    Putting on and taking off regular lower body clothing? 2  -SD 1  -AH     Bathing (including washing, rinsing, and drying) 2  -SD 1  -AH     Toileting (which includes using toilet bed pan or urinal) 2  -SD 1  -AH     Putting on and taking off regular upper body clothing 2  -SD 2  -AH     Taking care of personal grooming (such as brushing teeth) 2  -SD 2  -AH     Eating meals 2   -SD 2  -     Score 12  -SD 9  -AH     Functional Assessment    Outcome Measure Options AM-PAC 6 Clicks Daily Activity (OT)  -SD AM-PAC 6 Clicks Daily Activity (OT)  -AH       User Key  (r) = Recorded By, (t) = Taken By, (c) = Cosigned By    Initials Name Provider Type     Akilah Gutierrez Occupational Therapist    TONY Edwards, OT Occupational Therapist           Time Calculation:         Time Calculation- OT       01/03/18 1321          Time Calculation- OT    OT Start Time 1157  -SD      Total Timed Code Minutes- OT 23 minute(s)  -SD      OT Received On 01/03/18  -SD      OT Goal Re-Cert Due Date 01/07/18  -SD        User Key  (r) = Recorded By, (t) = Taken By, (c) = Cosigned By    Initials Name Provider Type    TONY Edwards, OT Occupational Therapist           Therapy Charges for Today     Code Description Service Date Service Provider Modifiers Qty    [id number]  OT SELF CARE/MGMT/TRAIN EA 15 MIN 1/3/2018 Kelle Edwards, OT GO 1               Kelle Edwards OT  1/3/2018

## 2018-01-03 NOTE — PROGRESS NOTES
[facility]    Name:  Thurman Mendel Parsons   Age:  67 y.o.  Sex:  male  :  1950  MRN:  [id number]   Visit Number:  [id number]  Admission Date:  2017  Date Of Service:  18  Primary Care Physician:  Miguel Rojas MD     LOS: 10 days :  Patient Care Team:  Miguel Rojas MD as PCP - General  Miguel Rojas MD as PCP - Family Medicine:    History taken from:     patient    Chief Complaint:      Left hip pain    Subjective     Interval History:      seen again today.  Patient is a 67-year-old male with history of chronic respiratory failure secondary to COPD, PEDRO and hypertension.  He was admitted with acute on chronic hypoxic hypercapnic respiratory failure.  He also has bilateral pneumonia which is being treated.  He is currently on Fortaz and Levaquin.  Fortaz will be discontinued tomorrow.  He is also on Solu-Medrol.  He continues with breathing treatments and high flow oxygen with BiPAP at night.  He is doing much better.  His main complaint is that he is weak and he is not eating well.  Encouraged him to increase oral intake.  His left arm he is moving with less difficulty now.  However when he stands, he has severe left hip pain.  He has had left total hip arthroplasty in the past.  He continues to improve on a daily basis.    Review of Systems:     All systems were reviewed and negative except for:  Respiratory: positive for  shortness of air    Objective     Vital Signs:    Temp:  [97.8 °F (36.6 °C)-98.1 °F (36.7 °C)] 97.8 °F (36.6 °C)  Heart Rate:  [59-80] 80  Resp:  [16-20] 18  BP: (110-126)/(58-64) 112/61    Physical Exam:      General Appearance:    Alert, cooperative, in no acute distress   Head:    Normocephalic, without obvious abnormality, atraumatic   Eyes:            Lids and lashes normal, conjunctivae and sclerae normal, no   icterus, no pallor, corneas clear, PERRLA   Ears:    Ears appear intact with no abnormalities noted    Throat:   No oral lesions, no thrush, oral mucosa moist   Neck:   No adenopathy, supple, trachea midline, no thyromegaly, no   carotid bruit, no JVD   Back:     No kyphosis present, no scoliosis present, no skin lesions,      erythema or scars, no tenderness to percussion or                   palpation,   range of motion normal   Lungs:     Rhonchi bilaterally without uses accessory muscles respiration.      Heart:    Regular rhythm and normal rate, normal S1 and S2, no            murmur, no gallop, no rub, no click   Chest Wall:    No abnormalities observed   Abdomen:     Normal bowel sounds, no masses, no organomegaly, soft        non-tender, non-distended, no guarding, no rebound                tenderness   Rectal:     Deferred   Extremities:   4/5 strength in upper/lower extremity  bilaterally.     Pulses:   Pulses palpable and equal bilaterally   Skin:   No bleeding, bruising or rash   Lymph nodes:   No palpable adenopathy   Neurologic:   Cranial nerves 2 - 12 grossly intact, sensation intact, DTR       present and equal bilaterally        Results Review:      I reviewed the patient's new clinical results.    Labs:    Lab Results (last 24 hours)     Procedure Component Value Units Date/Time    CBC & Differential [[id number]] Collected:  01/03/18 0527    Specimen:  Blood Updated:  01/03/18 0625    Narrative:       The following orders were created for panel order CBC & Differential.  Procedure                               Abnormality         Status                     ---------                               -----------         ------                     CBC Auto Differential[[id number]]        Abnormal            Final result                 Please view results for these tests on the individual orders.    CBC Auto Differential [[id number]]  (Abnormal) Collected:  01/03/18 0527    Specimen:  Blood Updated:  01/03/18 0625     WBC 12.67 (H) 10*3/mm3      RBC 4.03 (L) 10*6/mm3      Hemoglobin 11.7 (L) g/dL       Hematocrit 36.8 (L) %      MCV 91.3 fL      MCH 29.0 pg      MCHC 31.8 g/dL      RDW 12.0 %      RDW-SD 40.5 fl      MPV 9.6 fL      Platelets 200 10*3/mm3      Neutrophil % 87.2 (H) %      Lymphocyte % 4.9 (L) %      Monocyte % 7.3 %      Eosinophil % 0.0 %      Basophil % 0.0 %      Immature Grans % 0.6 %      Neutrophils, Absolute 11.06 (H) 10*3/mm3      Lymphocytes, Absolute 0.62 10*3/mm3      Monocytes, Absolute 0.92 (H) 10*3/mm3      Eosinophils, Absolute 0.00 10*3/mm3      Basophils, Absolute 0.00 10*3/mm3      Immature Grans, Absolute 0.07 (H) 10*3/mm3      nRBC 0.0 /100 WBC     Renal Function Panel [[id number]]  (Abnormal) Collected:  01/03/18 0527    Specimen:  Blood Updated:  01/03/18 0641     Glucose 92 mg/dL      BUN 38 (H) mg/dL      Creatinine 0.90 mg/dL      Sodium 140 mmol/L      Potassium 4.4 mmol/L      Chloride 101 mmol/L      CO2 37.0 (H) mmol/L      Calcium 8.7 mg/dL      Albumin 3.00 (L) g/dL      Phosphorus 2.7 mg/dL      Anion Gap 6.4 mmol/L      BUN/Creatinine Ratio 42.2 (H)     eGFR Non African Amer 84 mL/min/1.73     Narrative:       Abnormal estimated GFR should be followed by more specific studies to confirm end stage chronic renal disease. The equation used for calculation may not be accurate for patients less than 19 years old, greater than 70 years old, patients at extremes of weight, malnutrition, or with acute renal dysfunction.    Magnesium [[id number]]  (Normal) Collected:  01/03/18 0527    Specimen:  Blood Updated:  01/03/18 0641     Magnesium 2.3 mg/dL            Radiology:    Imaging Results (last 24 hours)     ** No results found for the last 24 hours. **          Medication Review:       amLODIPine 5 mg Oral Q24H   atorvastatin 40 mg Oral Daily   budesonide 0.5 mg Nebulization BID - RT   carvedilol 3.125 mg Oral Q12H   CHAPSTICK 1 each Apply externally BID   clopidogrel 75 mg Oral Daily   DULoxetine 30 mg Oral Daily   enoxaparin 40 mg Subcutaneous Q24H   finasteride 5 mg Oral  Daily   fluticasone 1 spray Nasal Daily   guaifenesin-dextromethorphan 2 tablet Oral BID   ipratropium-albuterol 3 mL Nebulization Q4H - RT   lactulose 20 g Oral BID   levoFLOXacin 750 mg Intravenous Q24H   losartan 50 mg Oral Daily   megestrol acetate 800 mg Oral Daily   methylPREDNISolone sodium succinate 40 mg Intravenous Q12H   mirtazapine 15 mg Oral Nightly   oxybutynin 5 mg Oral QAM   pantoprazole 40 mg Oral BID AC   polyethylene glycol 17 g Oral Daily   sennosides-docusate sodium 2 tablet Oral Nightly   tamsulosin 0.4 mg Oral Nightly   traZODone 50 mg Oral Nightly            Assessment/Plan     Problem List Items Addressed This Visit     Impaired mobility and ADLs    Acute exacerbation of chronic obstructive pulmonary disease (COPD) - Primary    Relevant Medications    BREO ELLIPTA 200-25 MCG/INH aerosol powder     PROAIR  (90 BASE) MCG/ACT inhaler    budesonide (PULMICORT) 0.5 MG/2ML nebulizer solution    ipratropium-albuterol (DUO-NEB) 0.5-2.5 mg/mL nebulizer    predniSONE (DELTASONE) 10 MG tablet    benzonatate (TESSALON) 200 MG capsule    HYDROcod Polst-CPM Polst ER (TUSSIONEX PENNKINETIC) 10-8 MG/5ML ER suspension    pseudoephedrine-guaifenesin (MUCINEX D)  MG per 12 hr tablet    fluticasone (FLONASE) 50 MCG/ACT nasal spray    predniSONE (DELTASONE) 10 MG tablet      Other Visit Diagnoses     Acute on chronic respiratory failure with hypercapnia              Acute exacerbation of chronic obstructive pulmonary disease (COPD)  Bilateral pneumonia, present on admission  Elevated troponin, Suspect type II MI  Hypertension  Right bundle-branch block  Obstructive sleep apnea with noncompliance to BiPAP  Acute on chronic hypoxic respiratory failure With hypercapnia  Left hip pain  Decreased oral intake    Plan:    Continue with Fortaz and Levaquin.  Continue with DuoNeb, Solu-Medrol, Pulmicort.  Patient is also on Tussionex, Mucinex DM Tessalon.  Continue with PT and OT.  We will add Megace to  increase appetite.  We'll check left hip x-ray.  Dulcolax and lactulose for constipation.  Encouraged patient to eat, and participating with PT and OT.  Check lab work in the a.m.  Further recommendations will depend on the clinical course.    Joshua Mendez DO  01/03/18  12:51 PM

## 2018-01-03 NOTE — THERAPY TREATMENT NOTE
Acute Care - Physical Therapy Treatment Note   [place]     Patient Name: Thurman Mendel Parsons  : 1950  MRN: [id number]  Today's Date: 1/3/2018  Onset of Illness/Injury or Date of Surgery Date: 17  Date of Referral to PT: 17  Referring Physician: Dr. Huber    Admit Date: 2017    Visit Dx:    ICD-10-CM ICD-9-CM   1. Acute exacerbation of chronic obstructive pulmonary disease (COPD) J44.1 491.21   2. Acute on chronic respiratory failure with hypercapnia J96.22 518.84   3. Impaired mobility and ADLs Z74.09 799.89   4. Impaired functional mobility, balance, gait, and endurance Z74.09 V49.89     Patient Active Problem List   Diagnosis   • Anxiety   • Atherosclerotic heart disease of native coronary artery without angina pectoris   • Atrial fibrillation   • Chronic kidney disease, stage III (moderate)   • Steroid-dependent COPD   • Degeneration of cervical intervertebral disc   • Diabetes mellitus   • GERD without esophagitis   • Diverticulosis   • Hemorrhoids   • Hiatal hernia   • Hyperlipidemia   • Hypertension   • Kyphosis, acquired   • Mitral and aortic valve disease   • Phimosis   • Sleep apnea   • Enlarged prostate without lower urinary tract symptoms (luts)   • History of arthritis   • Back pain   • Depression   • Stroke syndrome   • History of ventricular septal defect   • Neck pain   • Impaired mobility and ADLs   • Physical deconditioning   • Acute exacerbation of chronic obstructive pulmonary disease (COPD)               Adult Rehabilitation Note       18 1157 18 1155 18 1353    Rehab Assessment/Intervention    Discipline occupational therapist  -SD physical therapist  -LM occupational therapist  -    Document Type therapy note (daily note)  -SD therapy note (daily note)  -LM therapy note (daily note)  -    Subjective Information agree to therapy;complains of;weakness;pain  -SD agree to therapy;complains of;weakness;fatigue;dyspnea  -LM agree to  therapy;complains of;weakness;pain  -AH    Patient Effort, Rehab Treatment adequate  -SD adequate  -LM adequate  -AH    Symptoms Noted During/After Treatment fatigue;shortness of breath  -SD fatigue;shortness of breath  -LM fatigue;shortness of breath  -    Precautions/Limitations fall precautions;oxygen therapy device and L/min  -SD fall precautions;oxygen therapy device and L/min  -LM oxygen therapy device and L/min  -AH    Recorded by [SD] Kelle Edwards OT [LM] Xochitl Hernandez, PT [] Akilah Gutierrez    Vital Signs    Pre SpO2 (%) 92  -SD 92  -  -AH    O2 Delivery Pre Treatment supplemental O2  -SD supplemental O2   Hi-marc  -LM supplemental O2   hi flow   -AH    Intra SpO2 (%) 87  -SD 87  -LM 96  -AH    O2 Delivery Intra Treatment supplemental O2  -SD supplemental O2   Hi-marc  -LM supplemental O2   hi flow  -AH    Post SpO2 (%) 92  -SD 92  -LM 99  -AH    O2 Delivery Post Treatment supplemental O2  -SD supplemental O2   Hi-marc  -LM supplemental O2   hi flow  -AH    Recorded by [SD] Kelle Edwards OT [LM] Xochitl Hernandez, PT [] Akilah Gutierrez    Pain Assessment    Pain Assessment 0-10  -SD 0-10  -LM 0-10  -AH    Pain Score 8  -SD 8  -LM 7  -AH    Post Pain Score 8  -SD 8  -LM 7  -AH    Pain Type Acute pain  -SD Acute pain  -LM Acute pain  -AH    Pain Location Hip  -SD Hip  -LM Arm  -AH    Pain Orientation Right  -SD Left  -LM Left;Proximal   shoulder  -AH    Pain Intervention(s) Repositioned  -SD Repositioned;Ambulation/increased activity  -LM Repositioned   RN notified  -    Response to Interventions Tolerated  -SD Tolerated.  -LM     Recorded by [SD] Kelle Edwards OT [LM] Xochitl Hernandez, PT [] Akilah Gutierrez    Bed Mobility, Assessment/Treatment    Bed Mobility, Assistive Device bed rails;head of bed elevated  -SD bed rails;head of bed elevated  -LM     Bed Mob, Supine to Sit, [place] minimum assist (75% patient effort)  -SD minimum assist (75% patient effort)  -LM     Bed Mob, Sit to Supine,  [place]   minimum assist (75% patient effort)  -    Bed Mobility, Safety Issues  decreased use of legs for bridging/pushing  -     Bed Mobility, Impairments  strength decreased;impaired balance;pain  -LM     Recorded by [SD] Kelle Edwards OT [LM] Xochitl Hernandez, PT [] Akilah Gutierrez    Transfer Assessment/Treatment    Transfers, Bed-Chair [place]  moderate assist (50% patient effort);hand held assist  -     Transfers, Chair-Bed [place] moderate assist (50% patient effort)  -SD  moderate assist (50% patient effort)  -    Transfers, Sit-Stand [place] moderate assist (50% patient effort)  -SD moderate assist (50% patient effort);hand held assist  - moderate assist (50% patient effort)  -    Transfers, Stand-Sit [place] moderate assist (50% patient effort)  -SD moderate assist (50% patient effort);hand held assist  - minimum assist (75% patient effort)  -    Transfer, Safety Issues  balance decreased during turns;sequencing ability decreased;step length decreased;weight-shifting ability decreased  -     Transfer, Impairments  strength decreased;impaired balance;pain  -LM     Recorded by [SD] Kelle Edwards OT [LM] Xochitl Hernandez, PT [] Akilah Gutierrez    Gait Assessment/Treatment    Gait, [place] Level  moderate assist (50% patient effort);hand held assist  -     Gait, Distance (Feet)  3  -LM     Gait, Gait Pattern Analysis  swing-to gait  -LM     Gait, Gait Deviations  pati decreased;forward flexed posture;step length decreased;toe-to-floor clearance decreased  -     Gait, Safety Issues  balance decreased during turns;sequencing ability decreased;step length decreased;weight-shifting ability decreased;supplemental O2  -     Gait, Impairments  strength decreased;impaired balance;pain  -LM     Recorded by  [LM] Xochitl Hernandez, PT     Upper Body Bathing Assessment/Training    UB Bathing Assess/Train, [place] Level moderate assist (50% patient effort)  -SD       Recorded by [SD] Kelle Edwards OT      Lower Body Bathing Assessment/Training    LB Bathing Assess/Train, [place] Level maximum assist (25% patient effort)  -SD      Recorded by [SD] Kelle Edwards OT      Lower Body Dressing Assessment/Training    LB Dressing Assess/Train, [place] maximum assist (25% patient effort)  -SD      Recorded by [SD] Kelle Edwards OT      Therapy Exercises    Bilateral Lower Extremities  AAROM:;15 reps;sitting;ankle pumps/circles;AROM:;LAQ;other reps   seated [facility]  -     Bilateral Upper Extremity   AAROM:;5 reps;elbow flexion/extension;shoulder extension/flexion  -    Recorded by  [LM] Xochitl Hernandez, PT [] Akilah Gutierrez    Positioning and Restraints    Pre-Treatment Position in bed  -SD in bed  - sitting in chair/recliner  -    Post Treatment Position chair  -SD chair  - bed  -    In Bed  sitting;call light within reach;encouraged to call for assist;with family/caregiver  - supine;call light within reach;encouraged to call for assist;exit alarm on  -    In Chair sitting;call light within reach;encouraged to call for assist;with family/caregiver  -SD      Recorded by [SD] Kelle Edwards OT [LM] Xochitl Hernandez, PT [] Akilah Gutierrez      01/02/18 1052          Rehab Assessment/Intervention    Discipline physical therapy assistant  -      Document Type therapy note (daily note)  -RM      Subjective Information agree to therapy;complains of;fatigue  -RM      Patient Effort, Rehab Treatment fair  -RM      Symptoms Noted During/After Treatment fatigue;shortness of breath  -RM      Precautions/Limitations oxygen therapy device and L/min;fall precautions  -RM      Specific Treatment Considerations on BiPap  -RM      Recorded by [RM] Zenon Rothman, PTA      Vital Signs    Pre SpO2 (%) 95  -RM      O2 Delivery Pre Treatment supplemental O2  -RM      Intra SpO2 (%) 97  -RM      O2 Delivery Intra Treatment supplemental O2  -RM      Post SpO2 (%) 95  -RM       O2 Delivery Post Treatment supplemental O2  -RM      Pre Patient Position Supine  -RM      Intra Patient Position Standing  -RM      Post Patient Position Sitting  -RM      Recorded by [RM] Zenon Rothman PTA      Pain Assessment    Pain Assessment Unable to assess  -RM      Recorded by [] Zenon Rothman PTA      Bed Mobility, Assessment/Treatment    Bed Mobility, Assistive Device head of bed elevated;bed rails  -RM      Bed Mob, Supine to Sit, [place] minimum assist (75% patient effort)  -RM      Bed Mobility, Safety Issues decreased use of arms for pushing/pulling;decreased use of legs for bridging/pushing  -RM      Bed Mobility, Impairments strength decreased  -RM      Recorded by [RM] Zenon Rothman PTA      Transfer Assessment/Treatment    Transfers, Bed-Chair [place] minimum assist (75% patient effort);verbal cues required;nonverbal cues required (demo/gesture)  -RM      Transfers, Sit-Stand [place] verbal cues required;minimum assist (75% patient effort)  -RM      Transfers, Stand-Sit [place] verbal cues required;minimum assist (75% patient effort)  -RM      Recorded by [RM] Zenon Rothman PTA      Therapy Exercises    Bilateral Lower Extremities AAROM:;sitting;ankle pumps/circles;hip flexion;hip extension;LAQ  -RM      Recorded by [RM] Zenon Rothman PTA      Positioning and Restraints    Pre-Treatment Position in bed  -RM      Post Treatment Position chair  -RM      In Chair reclined;call light within reach;encouraged to call for assist;exit alarm on;notified nsg  -RM      Recorded by [RM] Zenon Rothman PTA        User Key  (r) = Recorded By, (t) = Taken By, (c) = Cosigned By    Initials Name Effective Dates    SOWMYA Gutierrez 10/26/16 -     LM Xochitl Hernandez, PT 10/26/16 -     RM Zenon Rothman PTA 10/26/16 -     SD Kelle Edwards, OT 06/30/17 -                 IP PT Goals       01/03/18 1329 01/02/18 1551 12/31/17 1532    Bed Mobility PT LTG    Bed Mobility PT  LTG, Outcome goal ongoing  -LM goal ongoing  -RM goal ongoing  -JR    Transfer Training PT LTG    Transfer Training PT LTG, Outcome goal ongoing  -LM goal ongoing  -RM goal ongoing  -JR    Gait Training PT LTG    Gait Training Goal PT LTG, Outcome goal ongoing  -LM  goal ongoing  -JR      12/28/17 1711          Bed Mobility PT LTG    Bed Mobility PT LTG, Date Established 12/28/17  -JR      Bed Mobility PT LTG, Time to Achieve 2 wks  -JR      Bed Mobility PT LTG, Activity Type all bed mobility  -JR      Bed Mobility PT LTG, [place] Level minimum assist (75% patient effort)  -JR      Transfer Training PT LTG    Transfer Training PT LTG, Date Established 12/28/17  -JR      Transfer Training PT LTG, Time to Achieve 2 wks  -JR      Transfer Training PT LTG, Activity Type sit to stand/stand to sit;bed to chair /chair to bed  -JR      Transfer Training PT LTG, [place] Level minimum assist (75% patient effort)  -JR      Transfer Training PT LTG, Assist Device walker, rolling  -JR      Gait Training PT LTG    Gait Training Goal PT LTG, Date Established 12/28/17  -JR      Gait Training Goal PT LTG, Time to Achieve 2 wks  -JR      Gait Training Goal PT LTG, [place] Level minimum assist (75% patient effort)  -JR      Gait Training Goal PT LTG, Assist Device walker, rolling  -JR      Gait Training Goal PT LTG, Distance to Achieve 25  -JR        User Key  (r) = Recorded By, (t) = Taken By, (c) = Cosigned By    Initials Name Provider Type    JR Kaylee Kurtz, PT Physical Therapist    FRANCESCO Hernandez, PT Physical Therapist    RM Zenon Rothman, PTA Physical Therapy Assistant          Physical Therapy Education     Title: PT OT SLP Therapies (Active)     Topic: Physical Therapy (Active)     Point: Mobility training (Done)    Learning Progress Summary    Learner Readiness Method Response Comment Documented by Status   Patient Acceptance E VU Purpose of PT; PLB/pacing to alleviate SOA with activity. FRANCESCO 01/03/18 0889  Done    Acceptance E,D VU,NR   01/02/18 1550 Done    Acceptance E NR   01/02/18 0334 Active    Acceptance E VU Importance of mobility to recovery  12/28/17 1706 Done   Family Acceptance E VU Purpose of PT; PLB/pacing to alleviate SOA with activity.  01/03/18 1328 Done               Point: Home exercise program (Done)    Learning Progress Summary    Learner Readiness Method Response Comment Documented by Status   Patient Acceptance E VU Purpose of PT; PLB/pacing to alleviate SOA with activity.  01/03/18 1328 Done    Acceptance E,D VU,NR   01/02/18 1550 Done    Acceptance E NR   01/02/18 0334 Active    Acceptance E VU instructed on the importance of exercises to improving mobility  12/31/17 1523 Done   Family Acceptance E VU Purpose of PT; PLB/pacing to alleviate SOA with activity.  01/03/18 1328 Done    Acceptance E VU instructed on the importance of exercises to improving mobility  12/31/17 1523 Done               Point: Precautions (Done)    Learning Progress Summary    Learner Readiness Method Response Comment Documented by Status   Patient Acceptance E VU Purpose of PT; PLB/pacing to alleviate SOA with activity.  01/03/18 1328 Done   Family Acceptance E VU Purpose of PT; PLB/pacing to alleviate SOA with activity.  01/03/18 1328 Done                      User Key     Initials Effective Dates Name Provider Type Discipline     10/26/16 -  Kaylee Kurtz, PT Physical Therapist PT     10/26/16 -  Xochitl Hernandez, PT Physical Therapist PT     10/26/16 -  Zenon Rothman PTA Physical Therapy Assistant PT     08/31/17 -  Mayda Nguyen, RN Registered Nurse Nurse                    PT Recommendation and Plan  Anticipated Discharge Disposition: inpatient rehabilitation facility  Planned Therapy Interventions: balance training, strengthening, bed mobility training, transfer training, gait training, patient/family education  PT Frequency: daily  Plan of Care Review  Plan Of Care Reviewed With:  patient, family  Progress: improving  Outcome Summary/Follow up Plan: PT treatment completed.  Patient performs bed mobility with min A and sit to stand and transfers with mod A.  Patient left up in bedside chair with family member present at bedside.          Outcome Measures       01/03/18 1157 01/03/18 1155 01/02/18 1353    How much help from another person do you currently need...    Turning from your back to your side while in flat bed without using bedrails?  2  -LM     Moving from lying on back to sitting on the side of a flat bed without bedrails?  2  -LM     Moving to and from a bed to a chair (including a wheelchair)?  2  -LM     Standing up from a chair using your arms (e.g., wheelchair, bedside chair)?  2  -LM     Climbing 3-5 steps with a railing?  1  -LM     To walk in hospital room?  1  -LM     AM-PAC 6 Clicks Score  10  -LM     How much help from another is currently needed...    Putting on and taking off regular lower body clothing? 2  -SD  1  -AH    Bathing (including washing, rinsing, and drying) 2  -SD  1  -AH    Toileting (which includes using toilet bed pan or urinal) 2  -SD  1  -AH    Putting on and taking off regular upper body clothing 2  -SD  2  -AH    Taking care of personal grooming (such as brushing teeth) 2  -SD  2  -AH    Eating meals 2  -SD  2  -AH    Score 12  -SD  9  -AH    Functional Assessment    Outcome Measure Options AM-PAC 6 Clicks Daily Activity (OT)  -SD AM-PAC 6 Clicks Basic Mobility (PT)  -LM AM-PAC 6 Clicks Daily Activity (OT)  -AH      User Key  (r) = Recorded By, (t) = Taken By, (c) = Cosigned By    Initials Name Provider Type     Akilah Gutierrez Occupational Therapist    LM Xochitl Hernandez, PT Physical Therapist    TONY Edwards OT Occupational Therapist           Time Calculation:       Therapy Charges for Today     Code Description Service Date Service Provider Modifiers Qty    [id number]  GAIT TRAINING EA 15 MIN 1/3/2018 Xochitl Hernandez, PT GP 1    [id number]   PT THER PROC EA 15 MIN 1/3/2018 Xochitl Hernandez, PT GP 1          PT G-Codes  Outcome Measure Options: AM-PAC 6 Clicks Daily Activity (OT)    Xochitl Hernandez, PT  1/3/2018

## 2018-01-03 NOTE — PLAN OF CARE
Problem: NPPV/CPAP (Adult)  Intervention: Monitor/Manage Anxiety Related to NPPV/CPAP   01/02/18 2353   Coping Strategies   Trust Relationship/Rapport care explained;choices provided     Intervention: Prevent Aspiration During Therapy   01/02/18 2353   Positioning   Head Of Bed (HOB) Position HOB elevated       Goal: Signs and Symptoms of Listed Potential Problems Will be Absent or Manageable (NPPV/CPAP)  Outcome: Ongoing (interventions implemented as appropriate)   01/02/18 2353   NPPV/CPAP   Problems Assessed (NPPV/CPAP) hypoxia/hypoxemia;dry mucous membranes;situational response;skin breakdown   Problems Present (NPPV/CPAP) none

## 2018-01-03 NOTE — PLAN OF CARE
Problem: Patient Care Overview (Adult)  Goal: Plan of Care Review  Outcome: Ongoing (interventions implemented as appropriate)   01/03/18 1601   Coping/Psychosocial Response Interventions   Plan Of Care Reviewed With patient   Patient Care Overview   Progress progress toward functional goals as expected   Outcome Evaluation   Outcome Summary/Follow up Plan pt needing assistance with ADLs, tolerating diet, pain control       Problem: COPD, Chronic Bronchitis/Emphysema (Adult)  Goal: Signs and Symptoms of Listed Potential Problems Will be Absent or Manageable (COPD, Chronic Bronchitis/Emphysema)  Outcome: Ongoing (interventions implemented as appropriate)      Problem: Respiratory Insufficiency (Adult)  Goal: Acid/Base Balance  Outcome: Ongoing (interventions implemented as appropriate)    Goal: Effective Ventilation  Outcome: Ongoing (interventions implemented as appropriate)

## 2018-01-04 LAB
ALBUMIN SERPL-MCNC: 3 G/DL (ref 3.5–5)
ANION GAP SERPL CALCULATED.3IONS-SCNC: 6.2 MMOL/L
BASOPHILS # BLD AUTO: 0.02 10*3/MM3 (ref 0–0.2)
BASOPHILS NFR BLD AUTO: 0.1 % (ref 0–2.5)
BUN BLD-MCNC: 41 MG/DL (ref 7–20)
BUN/CREAT SERPL: 45.6 (ref 6.3–21.9)
CALCIUM SPEC-SCNC: 8.9 MG/DL (ref 8.4–10.2)
CHLORIDE SERPL-SCNC: 102 MMOL/L (ref 98–107)
CO2 SERPL-SCNC: 36 MMOL/L (ref 26–30)
CREAT BLD-MCNC: 0.9 MG/DL (ref 0.6–1.3)
DEPRECATED RDW RBC AUTO: 40.8 FL (ref 37–54)
EOSINOPHIL # BLD AUTO: 0.01 10*3/MM3 (ref 0–0.7)
EOSINOPHIL NFR BLD AUTO: 0.1 % (ref 0–7)
ERYTHROCYTE [DISTWIDTH] IN BLOOD BY AUTOMATED COUNT: 12.1 % (ref 11.5–14.5)
GFR SERPL CREATININE-BSD FRML MDRD: 84 ML/MIN/1.73
GLUCOSE BLD-MCNC: 91 MG/DL (ref 74–98)
GLUCOSE BLDC GLUCOMTR-MCNC: 130 MG/DL (ref 70–130)
HCT VFR BLD AUTO: 36.2 % (ref 42–52)
HGB BLD-MCNC: 11.7 G/DL (ref 14–18)
IMM GRANULOCYTES # BLD: 0.1 10*3/MM3 (ref 0–0.06)
IMM GRANULOCYTES NFR BLD: 0.6 % (ref 0–0.6)
LYMPHOCYTES # BLD AUTO: 0.68 10*3/MM3 (ref 0.6–3.4)
LYMPHOCYTES NFR BLD AUTO: 4 % (ref 10–50)
MAGNESIUM SERPL-MCNC: 2.1 MG/DL (ref 1.6–2.3)
MCH RBC QN AUTO: 29.8 PG (ref 27–31)
MCHC RBC AUTO-ENTMCNC: 32.3 G/DL (ref 30–37)
MCV RBC AUTO: 92.1 FL (ref 80–94)
MONOCYTES # BLD AUTO: 1.2 10*3/MM3 (ref 0–0.9)
MONOCYTES NFR BLD AUTO: 7 % (ref 0–12)
NEUTROPHILS # BLD AUTO: 15.18 10*3/MM3 (ref 2–6.9)
NEUTROPHILS NFR BLD AUTO: 88.2 % (ref 37–80)
NRBC BLD MANUAL-RTO: 0 /100 WBC (ref 0–0)
PHOSPHATE SERPL-MCNC: 2 MG/DL (ref 2.5–4.5)
PLATELET # BLD AUTO: 202 10*3/MM3 (ref 130–400)
PMV BLD AUTO: 9.6 FL (ref 6–12)
POTASSIUM BLD-SCNC: 4.2 MMOL/L (ref 3.5–5.1)
RBC # BLD AUTO: 3.93 10*6/MM3 (ref 4.7–6.1)
SODIUM BLD-SCNC: 140 MMOL/L (ref 137–145)
WBC NRBC COR # BLD: 17.19 10*3/MM3 (ref 4.8–10.8)

## 2018-01-04 PROCEDURE — 80069 RENAL FUNCTION PANEL: CPT | Performed by: INTERNAL MEDICINE

## 2018-01-04 PROCEDURE — 97535 SELF CARE MNGMENT TRAINING: CPT

## 2018-01-04 PROCEDURE — 99233 SBSQ HOSP IP/OBS HIGH 50: CPT | Performed by: INTERNAL MEDICINE

## 2018-01-04 PROCEDURE — 97530 THERAPEUTIC ACTIVITIES: CPT

## 2018-01-04 PROCEDURE — 94660 CPAP INITIATION&MGMT: CPT

## 2018-01-04 PROCEDURE — 82962 GLUCOSE BLOOD TEST: CPT

## 2018-01-04 PROCEDURE — 94799 UNLISTED PULMONARY SVC/PX: CPT

## 2018-01-04 PROCEDURE — 25010000002 LEVOFLOXACIN PER 250 MG: Performed by: INTERNAL MEDICINE

## 2018-01-04 PROCEDURE — 83735 ASSAY OF MAGNESIUM: CPT | Performed by: INTERNAL MEDICINE

## 2018-01-04 PROCEDURE — 25010000002 LORAZEPAM PER 2 MG: Performed by: INTERNAL MEDICINE

## 2018-01-04 PROCEDURE — 25010000002 ENOXAPARIN PER 10 MG: Performed by: INTERNAL MEDICINE

## 2018-01-04 PROCEDURE — 25010000002 HYDROMORPHONE PER 4 MG: Performed by: INTERNAL MEDICINE

## 2018-01-04 PROCEDURE — 85025 COMPLETE CBC W/AUTO DIFF WBC: CPT | Performed by: INTERNAL MEDICINE

## 2018-01-04 PROCEDURE — 25010000002 METHYLPREDNISOLONE PER 40 MG: Performed by: INTERNAL MEDICINE

## 2018-01-04 RX ORDER — METAXALONE 800 MG/1
400 TABLET ORAL 3 TIMES DAILY
Status: DISCONTINUED | OUTPATIENT
Start: 2018-01-04 | End: 2018-01-11 | Stop reason: HOSPADM

## 2018-01-04 RX ORDER — IPRATROPIUM BROMIDE AND ALBUTEROL SULFATE 2.5; .5 MG/3ML; MG/3ML
3 SOLUTION RESPIRATORY (INHALATION)
Status: DISCONTINUED | OUTPATIENT
Start: 2018-01-04 | End: 2018-01-11 | Stop reason: HOSPADM

## 2018-01-04 RX ORDER — BISACODYL 10 MG
10 SUPPOSITORY, RECTAL RECTAL DAILY
Status: DISCONTINUED | OUTPATIENT
Start: 2018-01-04 | End: 2018-01-11 | Stop reason: HOSPADM

## 2018-01-04 RX ORDER — LACTULOSE 20 G/30ML
30 SOLUTION ORAL 3 TIMES DAILY
Status: DISCONTINUED | OUTPATIENT
Start: 2018-01-04 | End: 2018-01-05

## 2018-01-04 RX ORDER — METHYLPREDNISOLONE SODIUM SUCCINATE 40 MG/ML
40 INJECTION, POWDER, LYOPHILIZED, FOR SOLUTION INTRAMUSCULAR; INTRAVENOUS EVERY 24 HOURS
Status: DISCONTINUED | OUTPATIENT
Start: 2018-01-05 | End: 2018-01-11 | Stop reason: HOSPADM

## 2018-01-04 RX ADMIN — LACTULOSE 20 G: 20 SOLUTION ORAL at 08:45

## 2018-01-04 RX ADMIN — BUDESONIDE 0.5 MG: 0.5 INHALANT RESPIRATORY (INHALATION) at 20:44

## 2018-01-04 RX ADMIN — TRAZODONE HYDROCHLORIDE 50 MG: 50 TABLET ORAL at 21:44

## 2018-01-04 RX ADMIN — IPRATROPIUM BROMIDE AND ALBUTEROL SULFATE 3 ML: .5; 3 SOLUTION RESPIRATORY (INHALATION) at 06:58

## 2018-01-04 RX ADMIN — IPRATROPIUM BROMIDE AND ALBUTEROL SULFATE 3 ML: .5; 3 SOLUTION RESPIRATORY (INHALATION) at 00:03

## 2018-01-04 RX ADMIN — BUDESONIDE 0.5 MG: 0.5 INHALANT RESPIRATORY (INHALATION) at 06:58

## 2018-01-04 RX ADMIN — MIRTAZAPINE 15 MG: 15 TABLET, FILM COATED ORAL at 21:44

## 2018-01-04 RX ADMIN — IPRATROPIUM BROMIDE AND ALBUTEROL SULFATE 3 ML: .5; 3 SOLUTION RESPIRATORY (INHALATION) at 20:44

## 2018-01-04 RX ADMIN — GUAIFENESIN AND DEXTROMETHORPHAN HYDROBROMIDE 2 TABLET: 600; 30 TABLET, EXTENDED RELEASE ORAL at 21:43

## 2018-01-04 RX ADMIN — ENOXAPARIN SODIUM 40 MG: 40 INJECTION SUBCUTANEOUS at 22:01

## 2018-01-04 RX ADMIN — IPRATROPIUM BROMIDE AND ALBUTEROL SULFATE 3 ML: .5; 3 SOLUTION RESPIRATORY (INHALATION) at 04:14

## 2018-01-04 RX ADMIN — TRAMADOL HYDROCHLORIDE 50 MG: 50 TABLET, COATED ORAL at 17:19

## 2018-01-04 RX ADMIN — DULOXETINE HYDROCHLORIDE 30 MG: 30 CAPSULE, DELAYED RELEASE ORAL at 08:46

## 2018-01-04 RX ADMIN — CARVEDILOL 3.12 MG: 3.12 TABLET, FILM COATED ORAL at 21:44

## 2018-01-04 RX ADMIN — CARVEDILOL 3.12 MG: 3.12 TABLET, FILM COATED ORAL at 08:45

## 2018-01-04 RX ADMIN — PANTOPRAZOLE SODIUM 40 MG: 40 TABLET, DELAYED RELEASE ORAL at 06:12

## 2018-01-04 RX ADMIN — FINASTERIDE 5 MG: 5 TABLET, FILM COATED ORAL at 08:46

## 2018-01-04 RX ADMIN — PANTOPRAZOLE SODIUM 40 MG: 40 TABLET, DELAYED RELEASE ORAL at 17:19

## 2018-01-04 RX ADMIN — LACTULOSE 30 G: 20 SOLUTION ORAL at 17:19

## 2018-01-04 RX ADMIN — IPRATROPIUM BROMIDE AND ALBUTEROL SULFATE 3 ML: .5; 3 SOLUTION RESPIRATORY (INHALATION) at 12:57

## 2018-01-04 RX ADMIN — CLOPIDOGREL BISULFATE 75 MG: 75 TABLET ORAL at 08:46

## 2018-01-04 RX ADMIN — BISACODYL 10 MG: 10 SUPPOSITORY RECTAL at 14:07

## 2018-01-04 RX ADMIN — LEVOFLOXACIN 750 MG: 5 INJECTION, SOLUTION INTRAVENOUS at 08:45

## 2018-01-04 RX ADMIN — METAXALONE 400 MG: 800 TABLET ORAL at 21:44

## 2018-01-04 RX ADMIN — AMLODIPINE BESYLATE 5 MG: 5 TABLET ORAL at 08:46

## 2018-01-04 RX ADMIN — LORAZEPAM 0.5 MG: 2 INJECTION INTRAMUSCULAR; INTRAVENOUS at 14:07

## 2018-01-04 RX ADMIN — METHYLPREDNISOLONE SODIUM SUCCINATE 40 MG: 40 INJECTION, POWDER, FOR SOLUTION INTRAMUSCULAR; INTRAVENOUS at 04:28

## 2018-01-04 RX ADMIN — LOSARTAN POTASSIUM 50 MG: 50 TABLET, FILM COATED ORAL at 08:46

## 2018-01-04 RX ADMIN — Medication 10 ML: at 08:53

## 2018-01-04 RX ADMIN — GUAIFENESIN AND DEXTROMETHORPHAN HYDROBROMIDE 2 TABLET: 600; 30 TABLET, EXTENDED RELEASE ORAL at 08:45

## 2018-01-04 RX ADMIN — TRAMADOL HYDROCHLORIDE 50 MG: 50 TABLET, COATED ORAL at 08:46

## 2018-01-04 RX ADMIN — HYDROMORPHONE HYDROCHLORIDE 0.5 MG: 1 INJECTION, SOLUTION INTRAMUSCULAR; INTRAVENOUS; SUBCUTANEOUS at 14:08

## 2018-01-04 RX ADMIN — POLYETHYLENE GLYCOL 3350 17 G: 17 POWDER, FOR SOLUTION ORAL at 08:45

## 2018-01-04 RX ADMIN — OXYBUTYNIN CHLORIDE 5 MG: 5 TABLET ORAL at 06:12

## 2018-01-04 RX ADMIN — ATORVASTATIN CALCIUM 40 MG: 40 TABLET, FILM COATED ORAL at 08:45

## 2018-01-04 RX ADMIN — MEGESTROL ACETATE 800 MG: 40 SUSPENSION ORAL at 08:45

## 2018-01-04 RX ADMIN — TAMSULOSIN HYDROCHLORIDE 0.4 MG: 0.4 CAPSULE ORAL at 21:44

## 2018-01-04 RX ADMIN — FLUTICASONE PROPIONATE 1 SPRAY: 50 SPRAY, METERED NASAL at 08:48

## 2018-01-04 RX ADMIN — HYDROMORPHONE HYDROCHLORIDE 0.5 MG: 1 INJECTION, SOLUTION INTRAMUSCULAR; INTRAVENOUS; SUBCUTANEOUS at 04:28

## 2018-01-04 RX ADMIN — METAXALONE 400 MG: 800 TABLET ORAL at 17:18

## 2018-01-04 NOTE — THERAPY TREATMENT NOTE
Acute Care - Occupational Therapy Treatment Note   [place]     Patient Name: Thurman Mendel Parsons  : 1950  MRN: [id number]  Today's Date: 2018  Onset of Illness/Injury or Date of Surgery Date: 17  Date of Referral to OT: 17  Referring Physician: Dr. Huber      Admit Date: 2017    Visit Dx:     ICD-10-CM ICD-9-CM   1. Acute exacerbation of chronic obstructive pulmonary disease (COPD) J44.1 491.21   2. Acute on chronic respiratory failure with hypercapnia J96.22 518.84   3. Impaired mobility and ADLs Z74.09 799.89   4. Impaired functional mobility, balance, gait, and endurance Z74.09 V49.89     Patient Active Problem List   Diagnosis   • Anxiety   • Atherosclerotic heart disease of native coronary artery without angina pectoris   • Atrial fibrillation   • Chronic kidney disease, stage III (moderate)   • Steroid-dependent COPD   • Degeneration of cervical intervertebral disc   • Diabetes mellitus   • GERD without esophagitis   • Diverticulosis   • Hemorrhoids   • Hiatal hernia   • Hyperlipidemia   • Hypertension   • Kyphosis, acquired   • Mitral and aortic valve disease   • Phimosis   • Sleep apnea   • Enlarged prostate without lower urinary tract symptoms (luts)   • History of arthritis   • Back pain   • Depression   • Stroke syndrome   • History of ventricular septal defect   • Neck pain   • Impaired mobility and ADLs   • Physical deconditioning   • Acute exacerbation of chronic obstructive pulmonary disease (COPD)             Adult Rehabilitation Note       18 1417 18 1416 18 1157    Rehab Assessment/Intervention    Discipline occupational therapist  -SD physical therapist  -LM occupational therapist  -SD    Document Type therapy note (daily note)  -SD therapy note (daily note)  -LM therapy note (daily note)  -SD    Subjective Information agree to therapy;complains of;weakness;dyspnea  -SD agree to therapy;complains of;weakness;dyspnea  -LM agree to  therapy;complains of;weakness;pain  -SD    Patient Effort, Rehab Treatment adequate  -SD adequate  -LM adequate  -SD    Symptoms Noted During/After Treatment fatigue;shortness of breath  -SD fatigue;shortness of breath  -LM fatigue;shortness of breath  -SD    Precautions/Limitations fall precautions;oxygen therapy device and L/min  -SD fall precautions;oxygen therapy device and L/min  -LM fall precautions;oxygen therapy device and L/min  -SD    Recorded by [SD] Kelle Edwards OT [LM] Xochitl Hernandez, PT [SD] Kelle Edwards OT    Vital Signs    Pre SpO2 (%) 94  -SD 94  -LM 92  -SD    O2 Delivery Pre Treatment supplemental O2   High flow  -SD supplemental O2   Hi-marc  -LM supplemental O2  -SD    Intra SpO2 (%)   87  -SD    O2 Delivery Intra Treatment   supplemental O2  -SD    Post SpO2 (%) 92  -SD 92  -LM 92  -SD    O2 Delivery Post Treatment supplemental O2   high flow  -SD supplemental O2   Hi-marc  -LM supplemental O2  -SD    Recorded by [SD] Kelle Edwards OT [LM] Xochitl Hernandez, PT [SD] Kelle Edwards OT    Pain Assessment    Pain Assessment 0-10  -SD 0-10  -LM 0-10  -SD    Pain Score 6  -SD 6  -LM 8  -SD    Post Pain Score 6  -SD 6  -LM 8  -SD    Pain Type Acute pain  -SD Acute pain  -LM Acute pain  -SD    Pain Location Hip  -SD Hip  -LM Hip  -SD    Pain Orientation Left  -SD Left  -LM Right  -SD    Pain Intervention(s) Repositioned;Ambulation/increased activity  -SD Repositioned;Ambulation/increased activity  -LM Repositioned  -SD    Response to Interventions Tolerated  -SD Tolerated.  -LM Tolerated  -SD    Recorded by [SD] Kelle Edwards OT [LM] Xochitl Hernandze, PT [SD] Kelle Edwards OT    Bed Mobility, Assessment/Treatment    Bed Mobility, Assistive Device  bed rails;head of bed elevated  -LM bed rails;head of bed elevated  -SD    Bed Mob, Supine to Sit, [place]  supervision required  -LM minimum assist (75% patient effort)  -SD    Bed Mobility, Impairments  strength decreased;impaired balance;pain   -LM     Recorded by  [LM] Xochitl Hernandez, PT [SD] Kelle Edwards OT    Transfer Assessment/Treatment    Transfers, Bed-Chair [place] contact guard assist  -SD contact guard assist;hand held assist  -LM     Transfers, Chair-Bed [place]   moderate assist (50% patient effort)  -SD    Transfers, Sit-Stand [place] contact guard assist  -SD contact guard assist;hand held assist  -LM moderate assist (50% patient effort)  -SD    Transfers, Stand-Sit [place] contact guard assist  -SD contact guard assist;hand held assist  -LM moderate assist (50% patient effort)  -SD    Toilet Transfer, [place] contact guard assist  -SD contact guard assist  -LM     Transfer, Safety Issues  balance decreased during turns;sequencing ability decreased;step length decreased;weight-shifting ability decreased  -LM     Transfer, Impairments  strength decreased;impaired balance;pain  -LM     Recorded by [SD] Kelle Edwards OT [LM] Xochitl Hernandez, PT [SD] Kelle Edwards OT    Gait Assessment/Treatment    Gait, [place] Level  contact guard assist;hand held assist  -LM     Gait, Distance (Feet)  4  -LM     Gait, Gait Deviations  left:;antalgic;forward flexed posture;step length decreased;toe-to-floor clearance decreased  -LM     Gait, Safety Issues  balance decreased during turns;sequencing ability decreased;step length decreased;weight-shifting ability decreased;supplemental O2  -LM     Gait, Impairments  strength decreased;impaired balance;pain  -LM     Recorded by  [LM] Xochitl Hernandez, PT     Functional Mobility    Functional Mobility- Ind. Level contact guard assist  -SD      Functional Mobility- Device --   HHA  -SD      Functional Mobility-Distance (Feet) 4  -SD      Recorded by [SD] Kelle Edwards OT      Upper Body Bathing Assessment/Training    UB Bathing Assess/Train, [place] Level   moderate assist (50% patient effort)  -SD    Recorded by   [SD] Kelle Edwards OT    Lower Body Bathing  Assessment/Training    LB Bathing Assess/Train, [place] Level   maximum assist (25% patient effort)  -SD    Recorded by   [SD] Kelle Edwards OT    Lower Body Dressing Assessment/Training    LB Dressing Assess/Train, [place]   maximum assist (25% patient effort)  -SD    Recorded by   [SD] Kelle Edwards OT    Toileting Assessment/Training    Toileting Assess/Train, [place]ep[place] Level minimum assist (75% patient effort)  -SD      Recorded by [SD] Kelle Edwards OT      Therapy Exercises    Bilateral Lower Extremities  AAROM:;10 reps;sitting;ankle pumps/circles;LAQ;other reps   seated marches  -     Bilateral Upper Extremity AROM:;10 reps;elbow flexion/extension;shoulder abduction/adduction;shoulder extension/flexion  -SD      Recorded by [SD] Kelle Edwards OT [LM] Xochitl Hernandez, PT     Positioning and Restraints    Pre-Treatment Position in bed  -SD in bed  -LM in bed  -SD    Post Treatment Position chair  -SD chair  -LM chair  -SD    In Chair reclined;call light within reach;encouraged to call for assist  -SD reclined;call light within reach;encouraged to call for assist  -LM sitting;call light within reach;encouraged to call for assist;with family/caregiver  -SD    Recorded by [SD] Kelle Edwards OT [LM] Xochilt Hernandez, PT [SD] Kelle Edwards OT      01/03/18 1155 01/02/18 1353 01/02/18 1052    Rehab Assessment/Intervention    Discipline physical therapist  -LM occupational therapist  -AH physical therapy assistant  -RM    Document Type therapy note (daily note)  -LM therapy note (daily note)  - therapy note (daily note)  -    Subjective Information agree to therapy;complains of;weakness;fatigue;dyspnea  - agree to therapy;complains of;weakness;pain  - agree to therapy;complains of;fatigue  -RM    Patient Effort, Rehab Treatment adequate  -LM adequate  - fair  -    Symptoms Noted During/After Treatment fatigue;shortness of breath  - fatigue;shortness of breath  - fatigue;shortness  of breath  -RM    Precautions/Limitations fall precautions;oxygen therapy device and L/min  -LM oxygen therapy device and L/min  -AH oxygen therapy device and L/min;fall precautions  -RM    Specific Treatment Considerations   on BiPap  -RM    Recorded by [LM] Xochitl Hernandez, PT [] Akilah Gutierrez [] Zenon Rothman, PTA    Vital Signs    Pre SpO2 (%) 92  -  -AH 95  -RM    O2 Delivery Pre Treatment supplemental O2   Hi-marc  -LM supplemental O2   hi flow   -AH supplemental O2  -RM    Intra SpO2 (%) 87  -LM 96  -AH 97  -RM    O2 Delivery Intra Treatment supplemental O2   Hi-marc  -LM supplemental O2   hi flow  -AH supplemental O2  -RM    Post SpO2 (%) 92  -LM 99  -AH 95  -RM    O2 Delivery Post Treatment supplemental O2   Hi-marc  -LM supplemental O2   hi flow  -AH supplemental O2  -RM    Pre Patient Position   Supine  -RM    Intra Patient Position   Standing  -RM    Post Patient Position   Sitting  -RM    Recorded by [LM] Xochitl Hernandez, PT [] Akilah Gutierrez [] Zenon Rothman, PTA    Pain Assessment    Pain Assessment 0-10  -LM 0-10  -AH Unable to assess  -RM    Pain Score 8  -LM 7  -AH     Post Pain Score 8  -LM 7  -AH     Pain Type Acute pain  -LM Acute pain  -     Pain Location Hip  -LM Arm  -     Pain Orientation Left  -LM Left;Proximal   shoulder  -     Pain Intervention(s) Repositioned;Ambulation/increased activity  -LM Repositioned   RN notified  -     Response to Interventions Tolerated.  -LM      Recorded by [LM] Xochitl Hernandez, PT [] Akilah Gutierrez [] Zenon Rothman, PTA    Bed Mobility, Assessment/Treatment    Bed Mobility, Assistive Device bed rails;head of bed elevated  -LM  head of bed elevated;bed rails  -RM    Bed Mob, Supine to Sit, [place] minimum assist (75% patient effort)  -LM  minimum assist (75% patient effort)  -RM    Bed Mob, Sit to Supine, [place]  minimum assist (75% patient effort)  -     Bed Mobility, Safety Issues decreased use of legs for bridging/pushing   -LM  decreased use of arms for pushing/pulling;decreased use of legs for bridging/pushing  -RM    Bed Mobility, Impairments strength decreased;impaired balance;pain  -LM  strength decreased  -RM    Recorded by [LM] Xochitl Hernandez, PT [] Akilah Gutierrez [] Zenon Rothman, [facility]    Transfer Assessment/Treatment    Transfers, Bed-Chair [place] moderate assist (50% patient effort);hand held assist  -LM  minimum assist (75% patient effort);verbal cues required;nonverbal cues required (demo/gesture)  -RM    Transfers, Chair-Bed [place]  moderate assist (50% patient effort)  -     Transfers, Sit-Stand [place] moderate assist (50% patient effort);hand held assist  -LM moderate assist (50% patient effort)  - verbal cues required;minimum assist (75% patient effort)  -RM    Transfers, Stand-Sit [place] moderate assist (50% patient effort);hand held assist  -LM minimum assist (75% patient effort)  - verbal cues required;minimum assist (75% patient effort)  -RM    Transfer, Safety Issues balance decreased during turns;sequencing ability decreased;step length decreased;weight-shifting ability decreased  -LM      Transfer, Impairments strength decreased;impaired balance;pain  -LM      Recorded by [LM] Xochitl Hernandez, PT [] Akilah Gutierrez [] Zenon Rothman, [facility]    Gait Assessment/Treatment    Gait, [place] Level moderate assist (50% patient effort);hand held assist  -LM      Gait, Distance (Feet) 3  -LM      Gait, Gait Pattern Analysis swing-to gait  -LM      Gait, Gait Deviations pati decreased;forward flexed posture;step length decreased;toe-to-floor clearance decreased  -      Gait, Safety Issues balance decreased during turns;sequencing ability decreased;step length decreased;weight-shifting ability decreased;supplemental O2  -LM      Gait, Impairments strength decreased;impaired balance;pain  -LM      Recorded by [LM] Xochitl Hernandez PT      Therapy Exercises    Bilateral Lower Extremities  AAROM:;15 reps;sitting;ankle pumps/circles;AROM:;LAQ;other reps   seated [facility]  AAROM:;sitting;ankle pumps/circles;hip flexion;hip extension;LAQ  -RM    Bilateral Upper Extremity  AAROM:;5 reps;elbow flexion/extension;shoulder extension/flexion  -     Recorded by [] Xochitl Hernandez, PT [] Akilah Gutierrez [] Zenon Rothman PTA    Positioning and Restraints    Pre-Treatment Position in bed  - sitting in chair/recliner  - in bed  -RM    Post Treatment Position chair  -LM bed  -AH chair  -RM    In Bed sitting;call light within reach;encouraged to call for assist;with family/caregiver  - supine;call light within reach;encouraged to call for assist;exit alarm on  -     In Chair   reclined;call light within reach;encouraged to call for assist;exit alarm on;notified ns  -    Recorded by [LM] Xochitl Hernandez, PT [] Akilah Gutierrez [] Zenon Rothman PTA      User Key  (r) = Recorded By, (t) = Taken By, (c) = Cosigned By    Initials Name Effective Dates     Akilah Gutierrez 10/26/16 -     LM Xochitl Hernandez PT 10/26/16 -      Zenon Rothman PTA 10/26/16 -     SD Kelle Rindge, OT 06/30/17 -                 OT Goals       01/04/18 1550 01/03/18 1315 01/02/18 1519    Bed Mobility OT LTG    Bed Mobility OT LTG, Date Goal Reviewed  01/03/18  -SD 01/02/18  -    Bed Mobility OT LTG, Outcome  goal met  -SD goal ongoing  -AH    Transfer Training OT LTG    Transfer Training OT LTG, Date Goal Reviewed 01/04/18  -SD 01/03/18  -SD 01/02/18  -AH    Transfer Training OT LTG, Outcome goal met  -SD goal ongoing  -SD goal ongoing  -AH    Strength OT LTG    Strength Goal OT LTG, Date Goal Reviewed 01/04/18  -SD 01/03/18  -SD 01/02/18  -AH    Strength Goal OT LTG, Outcome goal ongoing  -SD goal ongoing  -SD goal ongoing  -AH    LB Dressing OT LTG    LB Dressing Goal OT LTG, Date Goal Reviewed 01/04/18  -SD 01/03/18  -SD 01/02/18  -    LB Dressing Goal OT LTG, Outcome goal ongoing  -SD goal ongoing  -SD goal  ongoing  -    UB Dressing OT LTG    UB Dressing Goal OT LTG, Date Goal Reviewed 01/04/18  -SD 01/03/18  -SD 01/02/18  -    UB Dressing Goal OT LTG, Outcome goal ongoing  -SD goal ongoing  -SD goal ongoing  -      12/28/17 1404          Bed Mobility OT LTG    Bed Mobility OT LTG, Date Established 12/28/17  -      Bed Mobility OT LTG, Time to Achieve by discharge  -      Bed Mobility OT LTG, Activity Type supine to sit/sit to supine  -      Bed Mobility OT LTG, [place] Level minimum assist (75% patient effort)  -      Bed Mobility OT LTG, Outcome goal ongoing  -      Transfer Training OT LTG    Transfer Training OT LTG, Date Established 12/28/17  -      Transfer Training OT LTG, Time to Achieve by discharge  -      Transfer Training OT LTG, Activity Type sit to stand/stand to sit;bed to chair /chair to bed  -      Transfer Training OT LTG, [place] Level minimum assist (75% patient effort)  -      Transfer Training OT LTG, Outcome goal ongoing  -      Strength OT LTG    Strength Goal OT LTG, Date Established 12/28/17  -      Strength Goal OT LTG, Time to Achieve by discharge  -      Strength Goal OT LTG, Functional Goal Pt will perform 15 reps UE ex progressing from AROM to strengthening as pt tolerates  -      Strength Goal OT LTG, Outcome goal ongoing  -      LB Dressing OT LTG    LB Dressing Goal OT LTG, Date Established 12/28/17  -      LB Dressing Goal OT LTG, Time to Achieve by discharge  -      LB Dressing Goal OT LTG, [place] Level moderate assist (50% patient effort)  -      LB Dressing Goal OT LTG, Outcome goal ongoing  -      UB Dressing OT LTG    UB Dressing Goal OT LTG, Date Established 12/28/17  -      UB Dressing Goal OT LTG, Time to Achieve by discharge  -      UB Dressing Goal OT LTG, [place] Level minimum assist (75% patient effort)  -      UB Dressing Goal OT LTG, Outcome goal ongoing  -        User Key  (r) = Recorded By, (t) =  Taken By, (c) = Cosigned By    Initials Name Provider Type     Akilah Gutierrez Occupational Therapist    TONY Edwards, OT Occupational Therapist          Occupational Therapy Education     Title: PT OT SLP Therapies (Active)     Topic: Occupational Therapy (Active)     Point: ADL training (Done)    Description: Instruct learner(s) on proper safety adaptation and remediation techniques during self care or transfers.   Instruct in proper use of assistive devices.    Learning Progress Summary    Learner Readiness Method Response Comment Documented by Status   Patient Acceptance E VU Safety/sequencing during functional transfers SD 01/04/18 1553 Done    Acceptance E VU PLB exercises during toileting task utilizing urinal in standing EOB to prevent dyspnea. SD 01/03/18 1319 Done    Acceptance E VU benefit of working with therapy to regain strength  01/02/18 1518 Done    Acceptance E NR   01/02/18 0334 Active    Acceptance E VU Role of OT/POC  12/28/17 1401 Done   Family Acceptance E VU  AE 01/01/18 0103 Done    Acceptance E VU Role of OT/POC  12/28/17 1401 Done               Point: Home exercise program (Done)    Description: Instruct learner(s) on appropriate technique for monitoring, assisting and/or progressing therapeutic exercises/activities.    Learning Progress Summary    Learner Readiness Method Response Comment Documented by Status   Patient Acceptance E VU benefit of working with therapy to regain strength  01/02/18 1518 Done                      User Key     Initials Effective Dates Name Provider Type Discipline     10/26/16 -  Akilah Gutierrez Occupational Therapist OT    SD 06/30/17 -  Kelle Edwards OT Occupational Therapist OT     08/31/17 -  Mayda Nguyen, RN Registered Nurse Nurse     01/20/17 -  Maryjane Subramanian, RN Registered Nurse Nurse                  OT Recommendation and Plan  Anticipated Discharge Disposition: inpatient rehabilitation facility  Planned Therapy  Interventions: ADL retraining, bed mobility training, strengthening, transfer training  Therapy Frequency: 3-5 times/wk  Plan of Care Review  Plan Of Care Reviewed With: patient  Progress: improving  Outcome Summary/Follow up Plan: OT tx completed. Patient completed bed mob with SBA; functional transfers EOB to BSC and BSC to chair with CGA, able to ambulate 4' with HHA on 25LPM high flow O2. Cont OT POC        Outcome Measures       01/04/18 1417 01/04/18 1416 01/03/18 1157    How much help from another person do you currently need...    Turning from your back to your side while in flat bed without using bedrails?  3  -LM     Moving from lying on back to sitting on the side of a flat bed without bedrails?  3  -LM     Moving to and from a bed to a chair (including a wheelchair)?  3  -LM     Standing up from a chair using your arms (e.g., wheelchair, bedside chair)?  3  -LM     Climbing 3-5 steps with a railing?  1  -LM     To walk in hospital room?  2  -LM     AM-PAC 6 Clicks Score  15  -LM     How much help from another is currently needed...    Putting on and taking off regular lower body clothing? 2  -SD  2  -SD    Bathing (including washing, rinsing, and drying) 2  -SD  2  -SD    Toileting (which includes using toilet bed pan or urinal) 3  -SD  2  -SD    Putting on and taking off regular upper body clothing 2  -SD  2  -SD    Taking care of personal grooming (such as brushing teeth) 3  -SD  2  -SD    Eating meals 3  -SD  2  -SD    Score 15  -SD  12  -SD    Functional Assessment    Outcome Measure Options AM-PAC 6 Clicks Daily Activity (OT)  -SD AM-PAC 6 Clicks Basic Mobility (PT)  -LM AM-PAC 6 Clicks Daily Activity (OT)  -SD      01/03/18 1155 01/02/18 1353       How much help from another person do you currently need...    Turning from your back to your side while in flat bed without using bedrails? 2  -LM      Moving from lying on back to sitting on the side of a flat bed without bedrails? 2  -LM      Moving to  and from a bed to a chair (including a wheelchair)? 2  -LM      Standing up from a chair using your arms (e.g., wheelchair, bedside chair)? 2  -LM      Climbing 3-5 steps with a railing? 1  -LM      To walk in hospital room? 1  -LM      AM-PAC 6 Clicks Score 10  -LM      How much help from another is currently needed...    Putting on and taking off regular lower body clothing?  1  -AH     Bathing (including washing, rinsing, and drying)  1  -AH     Toileting (which includes using toilet bed pan or urinal)  1  -AH     Putting on and taking off regular upper body clothing  2  -AH     Taking care of personal grooming (such as brushing teeth)  2  -AH     Eating meals  2  -AH     Score  9  -AH     Functional Assessment    Outcome Measure Options AM-PAC 6 Clicks Basic Mobility (PT)  -LM AM-PAC 6 Clicks Daily Activity (OT)  -AH       User Key  (r) = Recorded By, (t) = Taken By, (c) = Cosigned By    Initials Name Provider Type     Akilah Gutierrez Occupational Therapist    LM Xochitl Hernandez, PT Physical Therapist    TONY Edwards, OT Occupational Therapist           Time Calculation:         Time Calculation- OT       01/04/18 1554          Time Calculation- OT    OT Start Time 1417  -SD      Total Timed Code Minutes- OT 29 minute(s)  -SD      OT Received On 01/04/18  -SD      OT Goal Re-Cert Due Date 01/07/18  -SD        User Key  (r) = Recorded By, (t) = Taken By, (c) = Cosigned By    Initials Name Provider Type    SD Kelle Edwards, OT Occupational Therapist           Therapy Charges for Today     Code Description Service Date Service Provider Modifiers Qty    [id number]  OT SELF CARE/MGMT/TRAIN EA 15 MIN 1/3/2018 Kelle [place], OT GO 1    [id number]  OT SELF CARE/MGMT/TRAIN EA 15 MIN 1/4/2018 Kelle [place], OT GO 1               Kelle Edwards, OT  1/4/2018

## 2018-01-04 NOTE — PROGRESS NOTES
[facility]    Name:  Thurman Mendel Parsons   Age:  67 y.o.  Sex:  male  :  1950  MRN:  [id number]   Visit Number:  [id number]  Admission Date:  2017  Date Of Service:  18  Primary Care Physician:  Miguel Rojas MD     LOS: 11 days :  Patient Care Team:  Miguel Rojas MD as PCP - General  Miguel Rojas MD as PCP - Family Medicine:    History taken from:     patient    Chief Complaint:      Dyspnea    Subjective     Interval History:     Patient seen again today.  This is a 67-year-old male with history of chronic respiratory failure secondary to COPD, PEDRO and hypertension.  He came in with acute on chronic hypoxic hypercapnic respiratory failure.  He has bilateral pneumonia which has been treated with vancomycin, Fortaz and Levaquin.  He is now just on Levaquin.  He is also on Solu-Medrol.  He is on high flow oxygen as well as BiPAP at night.  He is improving on a daily basis.  He complains of left leg pain and left buttocks pain which appears to be due to sciatica.  We will add a muscle relaxer for this patient.  He denies any chest pressure, nausea, vomiting, or diarrhea.  He is constipated.  He denies any other problems.  He is only wearing the BiPAP at night, and seems to be eating more on the Megace.  He has not had a bowel movement since she has been here.  His oxygen requirements are decreasing.    Review of Systems:     All systems were reviewed and negative except for:  Respiratory: positive for  cough, productive and shortness of air  Gastrointestinal: postitive for  constipation  Musculoskeletal: positive for  back pain    Objective     Vital Signs:    Temp:  [98.1 °F (36.7 °C)-99.2 °F (37.3 °C)] 98.5 °F (36.9 °C)  Heart Rate:  [65-94] 88  Resp:  [16-24] 20  BP: (105-124)/(40-68) 113/55    Physical Exam:      General Appearance:    Alert, cooperative, in no acute distress   Head:    Normocephalic, without obvious abnormality, atraumatic    Eyes:            Lids and lashes normal, conjunctivae and sclerae normal, no   icterus, no pallor, corneas clear, PERRLA   Ears:    Ears appear intact with no abnormalities noted   Throat:   No oral lesions, no thrush, oral mucosa moist   Neck:   No adenopathy, supple, trachea midline, no thyromegaly, no   carotid bruit, no JVD   Back:     No kyphosis present, no scoliosis present, no skin lesions,      erythema or scars, no tenderness to percussion or                   palpation,   range of motion normal   Lungs:     Rhonchi bilaterally without uses accessory muscles respiration.      Heart:    Regular rhythm and normal rate, normal S1 and S2, no            murmur, no gallop, no rub, no click   Chest Wall:    No abnormalities observed   Abdomen:     Normal bowel sounds, no masses, no organomegaly, soft        non-tender, non-distended, no guarding, no rebound                tenderness   Rectal:     Deferred   Extremities:   Moves all extremities well, no edema, no cyanosis, no             redness.  Positive straight leg raising on the left.     Pulses:   Pulses palpable and equal bilaterally   Skin:   No bleeding, bruising or rash   Lymph nodes:   No palpable adenopathy   Neurologic:   Cranial nerves 2 - 12 grossly intact, sensation intact, DTR       present and equal bilaterally        Results Review:      I reviewed the patient's new clinical results.    Labs:    Lab Results (last 24 hours)     Procedure Component Value Units Date/Time    CBC & Differential [[id number]] Collected:  01/04/18 0543    Specimen:  Blood Updated:  01/04/18 0633    Narrative:       The following orders were created for panel order CBC & Differential.  Procedure                               Abnormality         Status                     ---------                               -----------         ------                     CBC Auto Differential[[id number]]        Abnormal            Final result                 Please view results for  these tests on the individual orders.    CBC Auto Differential [[id number]]  (Abnormal) Collected:  01/04/18 0543    Specimen:  Blood Updated:  01/04/18 0633     WBC 17.19 (H) 10*3/mm3      RBC 3.93 (L) 10*6/mm3      Hemoglobin 11.7 (L) g/dL      Hematocrit 36.2 (L) %      MCV 92.1 fL      MCH 29.8 pg      MCHC 32.3 g/dL      RDW 12.1 %      RDW-SD 40.8 fl      MPV 9.6 fL      Platelets 202 10*3/mm3      Neutrophil % 88.2 (H) %      Lymphocyte % 4.0 (L) %      Monocyte % 7.0 %      Eosinophil % 0.1 %      Basophil % 0.1 %      Immature Grans % 0.6 %      Neutrophils, Absolute 15.18 (H) 10*3/mm3      Lymphocytes, Absolute 0.68 10*3/mm3      Monocytes, Absolute 1.20 (H) 10*3/mm3      Eosinophils, Absolute 0.01 10*3/mm3      Basophils, Absolute 0.02 10*3/mm3      Immature Grans, Absolute 0.10 (H) 10*3/mm3      nRBC 0.0 /100 WBC     Magnesium [[id number]]  (Normal) Collected:  01/04/18 0543    Specimen:  Blood Updated:  01/04/18 0636     Magnesium 2.1 mg/dL     Renal Function Panel [[id number]]  (Abnormal) Collected:  01/04/18 0543    Specimen:  Blood Updated:  01/04/18 0640     Glucose 91 mg/dL      BUN 41 (H) mg/dL      Creatinine 0.90 mg/dL      Sodium 140 mmol/L      Potassium 4.2 mmol/L      Chloride 102 mmol/L      CO2 36.0 (H) mmol/L      Calcium 8.9 mg/dL      Albumin 3.00 (L) g/dL      Phosphorus 2.0 (L) mg/dL      Anion Gap 6.2 mmol/L      BUN/Creatinine Ratio 45.6 (H)     eGFR Non African Amer 84 mL/min/1.73     Narrative:       Abnormal estimated GFR should be followed by more specific studies to confirm end stage chronic renal disease. The equation used for calculation may not be accurate for patients less than 19 years old, greater than 70 years old, patients at extremes of weight, malnutrition, or with acute renal dysfunction.           Radiology:    Imaging Results (last 24 hours)     Procedure Component Value Units Date/Time    XR Hip With or Without Pelvis 2 - 3 View Left [[id number]] Collected:  01/03/18  1403     Updated:  01/03/18 1406    Narrative:       PROCEDURE: XR HIP W OR WO PELVIS 2-3 VIEW LEFT-     HISTORY: left hip pain; J44.1-Chronic obstructive pulmonary disease with  (acute) exacerbation; J96.22-Acute and chronic respiratory failure with  hypercapnia; Z74.09-Other reduced mobility; Z74.09-Other reduced  mobility     COMPARISON: September 14, 2017.     FINDINGS: The patient is status post total left hip arthroplasty. There  are no fractures or dislocations. The joint spaces are preserved. The  pubic symphysis and SI joints are intact. The soft tissues are  unremarkable.       Impression:       No fracture or dislocation.     This report was finalized on 1/3/2018 2:04 PM by Richelle Patel M.D..          Medication Review:       amLODIPine 5 mg Oral Q24H   atorvastatin 40 mg Oral Daily   budesonide 0.5 mg Nebulization BID - RT   carvedilol 3.125 mg Oral Q12H   CHAPSTICK 1 each Apply externally BID   clopidogrel 75 mg Oral Daily   DULoxetine 30 mg Oral Daily   enoxaparin 40 mg Subcutaneous Q24H   finasteride 5 mg Oral Daily   fluticasone 1 spray Nasal Daily   guaifenesin-dextromethorphan 2 tablet Oral BID   ipratropium-albuterol 3 mL Nebulization Q4H - RT   lactulose 20 g Oral BID   levoFLOXacin 750 mg Intravenous Q24H   losartan 50 mg Oral Daily   megestrol acetate 800 mg Oral Daily   methylPREDNISolone sodium succinate 40 mg Intravenous Q12H   mirtazapine 15 mg Oral Nightly   oxybutynin 5 mg Oral QAM   pantoprazole 40 mg Oral BID AC   polyethylene glycol 17 g Oral Daily   sennosides-docusate sodium 2 tablet Oral Nightly   tamsulosin 0.4 mg Oral Nightly   traZODone 50 mg Oral Nightly            Assessment/Plan     Problem List Items Addressed This Visit     Impaired mobility and ADLs    Acute exacerbation of chronic obstructive pulmonary disease (COPD) - Primary    Relevant Medications    BREO ELLIPTA 200-25 MCG/INH aerosol powder     PROAIR  (90 BASE) MCG/ACT inhaler    budesonide (PULMICORT)  0.5 MG/2ML nebulizer solution    ipratropium-albuterol (DUO-NEB) 0.5-2.5 mg/mL nebulizer    predniSONE (DELTASONE) 10 MG tablet    benzonatate (TESSALON) 200 MG capsule    HYDROcod Polst-CPM Polst ER (TUSSIONEX PENNKINETIC) 10-8 MG/5ML ER suspension    pseudoephedrine-guaifenesin (MUCINEX D)  MG per 12 hr tablet    fluticasone (FLONASE) 50 MCG/ACT nasal spray    predniSONE (DELTASONE) 10 MG tablet      Other Visit Diagnoses     Acute on chronic respiratory failure with hypercapnia              Acute exacerbation of chronic obstructive pulmonary disease (COPD)  Bilateral pneumonia, present on admission  Elevated troponin, Suspect type II MI  Hypertension  Right bundle-branch block  Obstructive sleep apnea with noncompliance to BiPAP  Acute on chronic hypoxic respiratory failure With hypercapnia  Left hip pain  Decreased oral intake  Sciatica    Plan:    Fortaz now discontinued.  Levaquin will be discontinued tomorrow.  Continue with DuoNeb, Solu-Medrol, Pulmicort.  Reduce Solu-Medrol to once daily.  Continue with PT and OT.  Patient seems to be eating better.  We will add muscle relaxer as patient seems to have sciatica  Check lab work in the a.m.  Continue with current medication regimen.  Hopefully to rehabilitation in the next few days.    Joshua Mendez,   01/04/18  1:18 PM

## 2018-01-04 NOTE — THERAPY TREATMENT NOTE
Acute Care - Physical Therapy Treatment Note   [place]     Patient Name: Thurman Mendel Parsons  : 1950  MRN: [id number]  Today's Date: 2018  Onset of Illness/Injury or Date of Surgery Date: 17  Date of Referral to PT: 17  Referring Physician: Dr. Huber    Admit Date: 2017    Visit Dx:    ICD-10-CM ICD-9-CM   1. Acute exacerbation of chronic obstructive pulmonary disease (COPD) J44.1 491.21   2. Acute on chronic respiratory failure with hypercapnia J96.22 518.84   3. Impaired mobility and ADLs Z74.09 799.89   4. Impaired functional mobility, balance, gait, and endurance Z74.09 V49.89     Patient Active Problem List   Diagnosis   • Anxiety   • Atherosclerotic heart disease of native coronary artery without angina pectoris   • Atrial fibrillation   • Chronic kidney disease, stage III (moderate)   • Steroid-dependent COPD   • Degeneration of cervical intervertebral disc   • Diabetes mellitus   • GERD without esophagitis   • Diverticulosis   • Hemorrhoids   • Hiatal hernia   • Hyperlipidemia   • Hypertension   • Kyphosis, acquired   • Mitral and aortic valve disease   • Phimosis   • Sleep apnea   • Enlarged prostate without lower urinary tract symptoms (luts)   • History of arthritis   • Back pain   • Depression   • Stroke syndrome   • History of ventricular septal defect   • Neck pain   • Impaired mobility and ADLs   • Physical deconditioning   • Acute exacerbation of chronic obstructive pulmonary disease (COPD)               Adult Rehabilitation Note       18 1416 18 1157 18 1155    Rehab Assessment/Intervention    Discipline physical therapist  -LM occupational therapist  -SD physical therapist  -LM    Document Type therapy note (daily note)  -LM therapy note (daily note)  -SD therapy note (daily note)  -LM    Subjective Information agree to therapy;complains of;weakness;dyspnea  -LM agree to therapy;complains of;weakness;pain  -SD agree to therapy;complains  of;weakness;fatigue;dyspnea  -LM    Patient Effort, Rehab Treatment adequate  -LM adequate  -SD adequate  -LM    Symptoms Noted During/After Treatment fatigue;shortness of breath  -LM fatigue;shortness of breath  -SD fatigue;shortness of breath  -LM    Precautions/Limitations fall precautions;oxygen therapy device and L/min  -LM fall precautions;oxygen therapy device and L/min  -SD fall precautions;oxygen therapy device and L/min  -LM    Recorded by [LM] Xochitl Hernandez, PT [SD] Kelle Edwards OT [LM] Xochitl Hernandez, PT    Vital Signs    Pre SpO2 (%) 94  -LM 92  -SD 92  -LM    O2 Delivery Pre Treatment supplemental O2   Hi-marc  -LM supplemental O2  -SD supplemental O2   Hi-marc  -LM    Intra SpO2 (%)  87  -SD 87  -LM    O2 Delivery Intra Treatment  supplemental O2  -SD supplemental O2   Hi-marc  -LM    Post SpO2 (%) 92  -LM 92  -SD 92  -LM    O2 Delivery Post Treatment supplemental O2   Hi-marc  -LM supplemental O2  -SD supplemental O2   Hi-marc  -LM    Recorded by [LM] Xochitl Hernandez, PT [SD] Kelle Edwards OT [LM] Xochitl Hernandez, PT    Pain Assessment    Pain Assessment 0-10  -LM 0-10  -SD 0-10  -LM    Pain Score 6  -LM 8  -SD 8  -LM    Post Pain Score 6  -LM 8  -SD 8  -LM    Pain Type Acute pain  -LM Acute pain  -SD Acute pain  -LM    Pain Location Hip  -LM Hip  -SD Hip  -LM    Pain Orientation Left  -LM Right  -SD Left  -LM    Pain Intervention(s) Repositioned;Ambulation/increased activity  -LM Repositioned  -SD Repositioned;Ambulation/increased activity  -LM    Response to Interventions Tolerated.  -LM Tolerated  -SD Tolerated.  -LM    Recorded by [LM] Xochitl Hernandez, PT [SD] Kelle Edwards OT [LM] Xochitl Hernandez, PT    Bed Mobility, Assessment/Treatment    Bed Mobility, Assistive Device bed rails;head of bed elevated  -LM bed rails;head of bed elevated  -SD bed rails;head of bed elevated  -LM    Bed Mob, Supine to Sit, [place] supervision required  -LM minimum assist (75% patient effort)  -SD minimum assist (75%  patient effort)  -LM    Bed Mobility, Safety Issues   decreased use of legs for bridging/pushing  -LM    Bed Mobility, Impairments strength decreased;impaired balance;pain  -LM  strength decreased;impaired balance;pain  -LM    Recorded by [LM] Xochitl Hernandez, PT [SD] Kelle Edwards OT [LM] Xochitl Hernandez, PT    Transfer Assessment/Treatment    Transfers, Bed-Chair [place] contact guard assist;hand held assist  -LM  moderate assist (50% patient effort);hand held assist  -LM    Transfers, Chair-Bed [place]  moderate assist (50% patient effort)  -SD     Transfers, Sit-Stand [place] contact guard assist;hand held assist  -LM moderate assist (50% patient effort)  -SD moderate assist (50% patient effort);hand held assist  -LM    Transfers, Stand-Sit [place] contact guard assist;hand held assist  -LM moderate assist (50% patient effort)  -SD moderate assist (50% patient effort);hand held assist  -LM    Toilet Transfer, [place] contact guard assist  -LM      Transfer, Safety Issues balance decreased during turns;sequencing ability decreased;step length decreased;weight-shifting ability decreased  -LM  balance decreased during turns;sequencing ability decreased;step length decreased;weight-shifting ability decreased  -LM    Transfer, Impairments strength decreased;impaired balance;pain  -LM  strength decreased;impaired balance;pain  -LM    Recorded by [LM] Xochitl Hernandez, PT [SD] Kelle Edwards OT [LM] Xochitl Hernandez, PT    Gait Assessment/Treatment    Gait, [place] Level contact guard assist;hand held assist  -LM  moderate assist (50% patient effort);hand held assist  -LM    Gait, Distance (Feet) 4  -LM  3  -LM    Gait, Gait Pattern Analysis   swing-to gait  -LM    Gait, Gait Deviations left:;antalgic;forward flexed posture;step length decreased;toe-to-floor clearance decreased  -LM  pati decreased;forward flexed posture;step length decreased;toe-to-floor clearance decreased  -LM    Gait, Safety  Issues balance decreased during turns;sequencing ability decreased;step length decreased;weight-shifting ability decreased;supplemental O2  -LM  balance decreased during turns;sequencing ability decreased;step length decreased;weight-shifting ability decreased;supplemental O2  -LM    Gait, Impairments strength decreased;impaired balance;pain  -LM  strength decreased;impaired balance;pain  -LM    Recorded by [LM] Xochitl Hernandez PT  [LM] Xochitl Hernandez, PT    Upper Body Bathing Assessment/Training    UB Bathing Assess/Train, [place] Level  moderate assist (50% patient effort)  -SD     Recorded by  [SD] Kelle Edwards OT     Lower Body Bathing Assessment/Training    LB Bathing Assess/Train, [place] Level  maximum assist (25% patient effort)  -SD     Recorded by  [SD] Kelle Edwards OT     Lower Body Dressing Assessment/Training    LB Dressing Assess/Train, [place]  maximum assist (25% patient effort)  -SD     Recorded by  [SD] Kelle Edwards OT     Therapy Exercises    Bilateral Lower Extremities AAROM:;10 reps;sitting;ankle pumps/circles;LAQ;other reps   seated marches  -LM  AAROM:;15 reps;sitting;ankle pumps/circles;AROM:;LAQ;other reps   seated marches  -LM    Recorded by [LM] Xochitl Hernandez PT  [LM] Xochitl Hernandez PT    Positioning and Restraints    Pre-Treatment Position in bed  -LM in bed  -SD in bed  -LM    Post Treatment Position chair  -LM chair  -SD chair  -LM    In Bed   sitting;call light within reach;encouraged to call for assist;with family/caregiver  -LM    In Chair reclined;call light within reach;encouraged to call for assist  -LM sitting;call light within reach;encouraged to call for assist;with family/caregiver  -SD     Recorded by [LM] Xochitl Hernandez PT [SD] Kelle Edwards OT [LM] Xochitl Hernandez, PT      01/02/18 1353 01/02/18 1052       Rehab Assessment/Intervention    Discipline occupational therapist  -AH physical therapy assistant  -RM     Document Type therapy note (daily note)   - therapy note (daily note)  -     Subjective Information agree to therapy;complains of;weakness;pain  - agree to therapy;complains of;fatigue  -     Patient Effort, Rehab Treatment adequate  - fair  -     Symptoms Noted During/After Treatment fatigue;shortness of breath  - fatigue;shortness of breath  -     Precautions/Limitations oxygen therapy device and L/min  - oxygen therapy device and L/min;fall precautions  -     Specific Treatment Considerations  on BiPap  -     Recorded by [] Akilah Gutierrez [] Zenon Rothman PTA     Vital Signs    Pre SpO2 (%) 100  - 95  -RM     O2 Delivery Pre Treatment supplemental O2   hi flow   - supplemental O2  -RM     Intra SpO2 (%) 96  - 97  -RM     O2 Delivery Intra Treatment supplemental O2   hi flow  - supplemental O2  -RM     Post SpO2 (%) 99  -AH 95  -RM     O2 Delivery Post Treatment supplemental O2   hi flow  - supplemental O2  -RM     Pre Patient Position  Supine  -RM     Intra Patient Position  Standing  -     Post Patient Position  Sitting  -     Recorded by [] Akilah Gutierrez [] Zenon Rothman PTA     Pain Assessment    Pain Assessment 0-10  - Unable to assess  -     Pain Score 7  -      Post Pain Score 7  -      Pain Type Acute pain  -      Pain Location Arm  -      Pain Orientation Left;Proximal   shoulder  -      Pain Intervention(s) Repositioned   RN notified  -      Recorded by [] Akilah Gutierrez [] Zenon Rothman PTA     Bed Mobility, Assessment/Treatment    Bed Mobility, Assistive Device  head of bed elevated;bed rails  -     Bed Mob, Supine to Sit, [place]  minimum assist (75% patient effort)  -     Bed Mob, Sit to Supine, [place] minimum assist (75% patient effort)  -      Bed Mobility, Safety Issues  decreased use of arms for pushing/pulling;decreased use of legs for bridging/pushing  -     Bed Mobility, Impairments  strength decreased  -     Recorded by [] Akilah Gutierrez  [] Zenon Rothman, HELADIO     Transfer Assessment/Treatment    Transfers, Bed-Chair [place]  minimum assist (75% patient effort);verbal cues required;nonverbal cues required (demo/gesture)  -RM     Transfers, Chair-Bed [place] moderate assist (50% patient effort)  -      Transfers, Sit-Stand [place] moderate assist (50% patient effort)  - verbal cues required;minimum assist (75% patient effort)  -RM     Transfers, Stand-Sit [place] minimum assist (75% patient effort)  - verbal cues required;minimum assist (75% patient effort)  -RM     Recorded by [] Akilah Gutierrez [] Zenon Rothman PTA     Therapy Exercises    Bilateral Lower Extremities  AAROM:;sitting;ankle pumps/circles;hip flexion;hip extension;LAQ  -RM     Bilateral Upper Extremity AAROM:;5 reps;elbow flexion/extension;shoulder extension/flexion  -      Recorded by [] Akilah Gutierrez [] Zenon Rothman PTA     Positioning and Restraints    Pre-Treatment Position sitting in chair/recliner  - in bed  -RM     Post Treatment Position bed  - chair  -RM     In Bed supine;call light within reach;encouraged to call for assist;exit alarm on  -      In Chair  reclined;call light within reach;encouraged to call for assist;exit alarm on;notified nsg  -     Recorded by [] Akilah Gutierrez [] Zenon Rothman PTA       User Key  (r) = Recorded By, (t) = Taken By, (c) = Cosigned By    Initials Name Effective Dates     Akilah Gutierrez 10/26/16 -     LM Xochitl Hernandez, PT 10/26/16 -      Zenon Rothman, PTA 10/26/16 -     SD Kelle Edwards, OT 06/30/17 -                 IP PT Goals       01/04/18 1532 01/03/18 1329 01/02/18 1551    Bed Mobility PT LTG    Bed Mobility PT LTG, Outcome goal partially met  -LM goal ongoing  -LM goal ongoing  -RM    Transfer Training PT LTG    Transfer Training PT LTG, Outcome goal partially met  -LM goal ongoing  -LM goal ongoing  -RM    Gait Training PT LTG    Gait Training Goal PT LTG, Outcome  goal ongoing  -LM goal ongoing  -LM       12/31/17 1532 12/28/17 1711       Bed Mobility PT LTG    Bed Mobility PT LTG, Date Established  12/28/17  -JR     Bed Mobility PT LTG, Time to Achieve  2 wks  -JR     Bed Mobility PT LTG, Activity Type  all bed mobility  -JR     Bed Mobility PT LTG, [place] Level  minimum assist (75% patient effort)  -JR     Bed Mobility PT LTG, Outcome goal ongoing  -JR      Transfer Training PT LTG    Transfer Training PT LTG, Date Established  12/28/17  -JR     Transfer Training PT LTG, Time to Achieve  2 wks  -JR     Transfer Training PT LTG, Activity Type  sit to stand/stand to sit;bed to chair /chair to bed  -JR     Transfer Training PT LTG, [place] Level  minimum assist (75% patient effort)  -JR     Transfer Training PT LTG, Assist Device  walker, rolling  -JR     Transfer Training PT LTG, Outcome goal ongoing  -JR      Gait Training PT LTG    Gait Training Goal PT LTG, Date Established  12/28/17  -JR     Gait Training Goal PT LTG, Time to Achieve  2 wks  -JR     Gait Training Goal PT LTG, [place] Level  minimum assist (75% patient effort)  -JR     Gait Training Goal PT LTG, Assist Device  walker, rolling  -JR     Gait Training Goal PT LTG, Distance to Achieve  25  -JR     Gait Training Goal PT LTG, Outcome goal ongoing  -JR        User Key  (r) = Recorded By, (t) = Taken By, (c) = Cosigned By    Initials Name Provider Type    JR Kaylee Kurtz, PT Physical Therapist    LM Xochitl Hernandez, PT Physical Therapist    RM Zenon Rothman, PTA Physical Therapy Assistant          Physical Therapy Education     Title: PT OT SLP Therapies (Active)     Topic: Physical Therapy (Active)     Point: Mobility training (Done)    Learning Progress Summary    Learner Readiness Method Response Comment Documented by Status   Patient Acceptance E VU Ther ex for HEP; safety techniques for transfers. LM 01/04/18 1532 Done    Acceptance E VU Purpose of PT; PLB/pacing to alleviate SOA with activity.   01/03/18 1328 Done    Acceptance E,D VU,NR   01/02/18 1550 Done    Acceptance E NR   01/02/18 0334 Active    Acceptance E VU Importance of mobility to recovery  12/28/17 1706 Done   Family Acceptance E VU Purpose of PT; PLB/pacing to alleviate SOA with activity.  01/03/18 1328 Done               Point: Home exercise program (Done)    Learning Progress Summary    Learner Readiness Method Response Comment Documented by Status   Patient Acceptance E VU Ther ex for HEP; safety techniques for transfers.  01/04/18 1532 Done    Acceptance E VU Purpose of PT; PLB/pacing to alleviate SOA with activity.  01/03/18 1328 Done    Acceptance E,D VU,NR   01/02/18 1550 Done    Acceptance E NR   01/02/18 0334 Active    Acceptance E VU instructed on the importance of exercises to improving mobility  12/31/17 1523 Done   Family Acceptance E VU Purpose of PT; PLB/pacing to alleviate SOA with activity.  01/03/18 1328 Done    Acceptance E VU instructed on the importance of exercises to improving mobility  12/31/17 1523 Done               Point: Precautions (Done)    Learning Progress Summary    Learner Readiness Method Response Comment Documented by Status   Patient Acceptance E VU Ther ex for HEP; safety techniques for transfers.  01/04/18 1532 Done    Acceptance E VU Purpose of PT; PLB/pacing to alleviate SOA with activity.  01/03/18 1328 Done   Family Acceptance E VU Purpose of PT; PLB/pacing to alleviate SOA with activity.  01/03/18 1328 Done                      User Key     Initials Effective Dates Name Provider Type Discipline     10/26/16 -  Kaylee Kurtz, PT Physical Therapist PT     10/26/16 -  Xochitl Hernandez, PT Physical Therapist PT     10/26/16 -  Zenon Rothman PTA Physical Therapy Assistant PT     08/31/17 -  Mayda Nguyen, RN Registered Nurse Nurse                    PT Recommendation and Plan  Anticipated Discharge Disposition: inpatient rehabilitation facility  Planned Therapy  Interventions: balance training, strengthening, bed mobility training, transfer training, gait training, patient/family education  PT Frequency: daily  Plan of Care Review  Plan Of Care Reviewed With: patient  Progress: improving  Outcome Summary/Follow up Plan: Pt continues to reports L hip pain, especially with movement, but is agreeable to work with PT to advance mobiltiy.  Pt requires SBA for bed mobility and CGA sit to stand to to transfer to BSC and bedside chair.  He is able to ambulate 4 feet with CGA HHA..  Cont to goals.          Outcome Measures       01/04/18 1416 01/03/18 1157 01/03/18 1155    How much help from another person do you currently need...    Turning from your back to your side while in flat bed without using bedrails? 3  -LM  2  -LM    Moving from lying on back to sitting on the side of a flat bed without bedrails? 3  -LM  2  -LM    Moving to and from a bed to a chair (including a wheelchair)? 3  -LM  2  -LM    Standing up from a chair using your arms (e.g., wheelchair, bedside chair)? 3  -LM  2  -LM    Climbing 3-5 steps with a railing? 1  -LM  1  -LM    To walk in hospital room? 2  -LM  1  -LM    AM-PAC 6 Clicks Score 15  -LM  10  -LM    How much help from another is currently needed...    Putting on and taking off regular lower body clothing?  2  -SD     Bathing (including washing, rinsing, and drying)  2  -SD     Toileting (which includes using toilet bed pan or urinal)  2  -SD     Putting on and taking off regular upper body clothing  2  -SD     Taking care of personal grooming (such as brushing teeth)  2  -SD     Eating meals  2  -SD     Score  12  -SD     Functional Assessment    Outcome Measure Options AM-PAC 6 Clicks Basic Mobility (PT)  -LM AM-PAC 6 Clicks Daily Activity (OT)  -SD AM-PAC 6 Clicks Basic Mobility (PT)  -LM      01/02/18 1353          How much help from another is currently needed...    Putting on and taking off regular lower body clothing? 1  -AH      Bathing  (including washing, rinsing, and drying) 1  -AH      Toileting (which includes using toilet bed pan or urinal) 1  -AH      Putting on and taking off regular upper body clothing 2  -AH      Taking care of personal grooming (such as brushing teeth) 2  -AH      Eating meals 2  -      Score 9  -      Functional Assessment    Outcome Measure Options AM-PAC 6 Clicks Daily Activity (OT)  -        User Key  (r) = Recorded By, (t) = Taken By, (c) = Cosigned By    Initials Name Provider Type     Akilah Gutierrez Occupational Therapist    LM Xochitl Hernandez, PT Physical Therapist    TONY Edwards, OT Occupational Therapist           Time Calculation:         PT Charges       01/04/18 1536          Time Calculation    Start Time 1416  -LM      PT Received On 01/04/18  -LM      Time Calculation- PT    Total Timed Code Minutes- PT 23 minute(s)  -        User Key  (r) = Recorded By, (t) = Taken By, (c) = Cosigned By    Initials Name Provider Type     Xochitl Hernandez, PT Physical Therapist          Therapy Charges for Today     Code Description Service Date Service Provider Modifiers Qty    73213890276 HC GAIT TRAINING EA 15 MIN 1/3/2018 Xochitl Hernandez, PT GP 1    14318408291 HC PT THER PROC EA 15 MIN 1/3/2018 Xochitl Hernandez, PT GP 1    41402696557 HC PT THERAPEUTIC ACT EA 15 MIN 1/4/2018 Xochitl Hernandez, PT GP 2          PT G-Codes  Outcome Measure Options: AM-PAC 6 Clicks Basic Mobility (PT)    Xochitl Hernandez PT  1/4/2018

## 2018-01-04 NOTE — PROGRESS NOTES
Continued Stay Note   [place]     Patient Name: Thurman Mendel Parsons  MRN: [id number]  Today's Date: 1/4/2018    Admit Date: 12/24/2017          Discharge Plan       01/04/18 1008    Case Management/Social Work Plan    Plan Pt is still on 25 liters of High Flow o2 will not be able to get a rehab bed until below 6 lites.  [facility] will accept when medically stable.                Discharge Codes     None            [place]

## 2018-01-04 NOTE — PLAN OF CARE
Problem: Patient Care Overview (Adult)  Goal: Plan of Care Review  Outcome: Ongoing (interventions implemented as appropriate)   01/04/18 1436   Coping/Psychosocial Response Interventions   Plan Of Care Reviewed With patient   Patient Care Overview   Progress improving       Problem: NPPV/CPAP (Adult)  Goal: Signs and Symptoms of Listed Potential Problems Will be Absent or Manageable (NPPV/CPAP)  Outcome: Ongoing (interventions implemented as appropriate)   01/04/18 1436   NPPV/CPAP   Problems Assessed (NPPV/CPAP) all   Problems Present (NPPV/CPAP) none

## 2018-01-04 NOTE — PLAN OF CARE
Problem: NPPV/CPAP (Adult)  Intervention: Prevent Aspiration During Therapy   01/04/18 0155   Positioning   Head Of Bed (HOB) Position HOB elevated     Intervention: Assess/Manage Patient Tolerance of Noninvasive Ventilation   01/04/18 0155   Respiratory Interventions   Airway/Ventilation Management airway patency maintained;pulmonary hygiene promoted;humidification applied     Intervention: Prevent/Minimize Device Friction/Shearing and Pressure Points   01/04/18 0155   Respiratory Interventions   NPPV/CPAP Maintenance mask adjusted;other (see comments)  (comfort gel placed on nasal bridge)       Goal: Signs and Symptoms of Listed Potential Problems Will be Absent or Manageable (NPPV/CPAP)  Outcome: Ongoing (interventions implemented as appropriate)   01/04/18 0155   NPPV/CPAP   Problems Assessed (NPPV/CPAP) hypoxia/hypoxemia;situational response;skin breakdown;dry mucous membranes   Problems Present (NPPV/CPAP) skin breakdown;hypoxia/hypoxemia

## 2018-01-04 NOTE — PROGRESS NOTES
LOS: 11 days   Patient Care Team:  Miguel Rojas MD as PCP - General  Miguel Roajs MD as PCP - Family Medicine  Primary care team: Hospitalist  Primary pulmonologist: Kevin Lopez M.D.  Chief Complaint: Hypoxemic hypercapnic respiratory failure which has improved but patient still requires high flow oxygen FiO2 0.40 and 25 L/m currently to maintain adequate oxygenation but also improved hypercapnia.  Patient does use noninvasive positive pressure ventilation as well    Subjective     Shortness of Breath         Subjective:  Symptoms:  He reports shortness of breath.    Patient's dyspnea has improved but still dyspneic with minimal exertion getting physical therapy more alert and able to eat and swallow better able to self feed with no choking.  Patient has been having difficulty in using the full facemask because of nasal bridge injury from too much pressure with the previous mask and even his home mask does give him continued bruising over the nasal bridge.  Patient has not had any arterial blood gases since 1/1/18 but clinically patient seems to be improving size PCO2 may have even gone further down from 57 on 1/1/2018 pH was 7.39 at that time and a PO2 of 86.  Today's laboratory data showing B UN at 41 creatinine 0.9 with adequate GFR normal electrolytes blood sugar of 91 WBCs 17.1 hemoglobin at 11.7.  During physical therapy patient had left hip pain so x-ray of the hip was done which showed post-arthroplasty with no dislocation or fracture noted.  Patient's transthoracic echo showed mild-to-moderate LVH with to reduce left and regular systolic function of 45-50% with mild left atrial enlargement but normal left ventricular end-diastolic pressure.  Mild mitral regurgitation as well as mild tricuspid regurgitation.  No significant pulmonary hypertension on increasing right ventricular pressure noted.    History taken from: patient chart    Objective     Vital Signs  Temp:  [98.1 °F (36.7 °C)-99.2 °F (37.3 °C)]  98.5 °F (36.9 °C)  Heart Rate:  [65-94] 88  Resp:  [16-24] 20  BP: (105-124)/(40-68) 113/55    Objective    Results Review:     I reviewed the patient's new clinical results.  I reviewed the patient's new imaging results and agree with the interpretation.  I reviewed the patient's other test results and agree with the interpretation    Medication Review: Medication review was done with no new changes made.    Assessment/Plan     Active Problems:    Acute exacerbation of chronic obstructive pulmonary disease (COPD)  Hypercapnic respiratory failure appears to be improving and stable.  Patient still requires high flow oxygen and I have reduced the FiO2 0.40.    Assessment & Plan   To gradual decreasing need for oxygen increased mobility continued secretion clearance consider using flutter valve.  Continued use of bronchodilator nebulization as well as current antibiotic which will need to be tapered down and de-escalation should be considered.  Patient would benefit from inpatient aggressive rehabilitation.  Kevin Lopez MD  01/04/18  1:34 PM

## 2018-01-04 NOTE — PLAN OF CARE
Problem: Patient Care Overview (Adult)  Goal: Plan of Care Review  Outcome: Ongoing (interventions implemented as appropriate)   01/04/18 1550   Coping/Psychosocial Response Interventions   Plan Of Care Reviewed With patient   Patient Care Overview   Progress improving   Outcome Evaluation   Outcome Summary/Follow up Plan OT tx completed. Patient completed bed mob with SBA; functional transfers EOB to BSC and BSC to chair with CGA, able to ambulate 4' with HHA on 25LPM high flow O2. Cont OT POC       Problem: Inpatient Occupational Therapy  Goal: Transfer Training Goal 1 LTG- OT  Outcome: Outcome(s) achieved Date Met: 01/04/18 12/28/17 1404 01/04/18 1550   Transfer Training OT LTG   Transfer Training OT LTG, Date Established 12/28/17 --    Transfer Training OT LTG, Time to Achieve by discharge --    Transfer Training OT LTG, Activity Type sit to stand/stand to sit;bed to chair /chair to bed --    Transfer Training OT LTG, [place] Level minimum assist (75% patient effort) --    Transfer Training OT LTG, Date Goal Reviewed --  01/04/18   Transfer Training OT LTG, Outcome --  goal met     Goal: Strength Goal LTG- OT  Outcome: Ongoing (interventions implemented as appropriate)   12/28/17 1404 01/04/18 1550   Strength OT LTG   Strength Goal OT LTG, Date Established 12/28/17 --    Strength Goal OT LTG, Time to Achieve by discharge --    Strength Goal OT LTG, Functional Goal Pt will perform 15 reps UE ex progressing from AROM to strengthening as pt tolerates --    Strength Goal OT LTG, Date Goal Reviewed --  01/04/18   Strength Goal OT LTG, Outcome --  goal ongoing     Goal: LB Dressing Goal LTG- OT  Outcome: Ongoing (interventions implemented as appropriate)   12/28/17 1404 01/04/18 1550   LB Dressing OT LTG   LB Dressing Goal OT LTG, Date Established 12/28/17 --    LB Dressing Goal OT LTG, Time to Achieve by discharge --    LB Dressing Goal OT LTG, [place] Level moderate assist (50% patient effort) --    LB  Dressing Goal OT LTG, Date Goal Reviewed --  01/04/18   LB Dressing Goal OT LTG, Outcome --  goal ongoing     Goal: UB Dressing Goal LTG- OT  Outcome: Ongoing (interventions implemented as appropriate)   12/28/17 1404 01/04/18 1550   UB Dressing OT LTG   UB Dressing Goal OT LTG, Date Established 12/28/17 --    UB Dressing Goal OT LTG, Time to Achieve by discharge --    UB Dressing Goal OT LTG, [place] Level minimum assist (75% patient effort) --    UB Dressing Goal OT LTG, Date Goal Reviewed --  01/04/18   UB Dressing Goal OT LTG, Outcome --  goal ongoing

## 2018-01-04 NOTE — PLAN OF CARE
Problem: Patient Care Overview (Adult)  Goal: Plan of Care Review  Outcome: Ongoing (interventions implemented as appropriate)   01/04/18 1532   Coping/Psychosocial Response Interventions   Plan Of Care Reviewed With patient   Patient Care Overview   Progress improving   Outcome Evaluation   Outcome Summary/Follow up Plan Pt continues to reports L hip pain, especially with movement, but is agreeable to work with PT to advance mobiltiy. Pt requires SBA for bed mobility and CGA sit to stand to to transfer to BSC and bedside chair. He is able to ambulate 4 feet with CGA HHA.. Cont to goals.       Problem: Inpatient Physical Therapy  Goal: Bed Mobility Goal LTG- PT  Outcome: Ongoing (interventions implemented as appropriate)   12/28/17 1711 01/04/18 1532   Bed Mobility PT LTG   Bed Mobility PT LTG, Date Established 12/28/17 --    Bed Mobility PT LTG, Time to Achieve 2 wks --    Bed Mobility PT LTG, Activity Type all bed mobility --    Bed Mobility PT LTG, [place] Level minimum assist (75% patient effort) --    Bed Mobility PT LTG, Outcome --  goal partially met     Goal: Transfer Training Goal 1 LTG- PT  Outcome: Ongoing (interventions implemented as appropriate)   12/28/17 1711 01/04/18 1532   Transfer Training PT LTG   Transfer Training PT LTG, Date Established 12/28/17 --    Transfer Training PT LTG, Time to Achieve 2 wks --    Transfer Training PT LTG, Activity Type sit to stand/stand to sit;bed to chair /chair to bed --    Transfer Training PT LTG, [place] Level minimum assist (75% patient effort) --    Transfer Training PT LTG, Assist Device walker, rolling --    Transfer Training PT LTG,    12/28/17 1711 01/04/18 1532   Transfer Training PT LTG   Transfer Training PT LTG, Date Established 12/28/17 --    Transfer Training PT LTG, Time to Achieve 2 wks --    Transfer Training PT LTG, Activity Type sit to stand/stand to sit;bed to chair /chair to bed --    Transfer Training PT LTG, [place] Level  minimum assist (75% patient effort) --    Transfer Training PT LTG, Assist Device walker, rolling --    Transfer Training PT LTG, Outcome --  goal partially met   Outcome --  goal ongoing     Goal: Gait Training Goal LTG- PT  Outcome: Ongoing (interventions implemented as appropriate)   12/28/17 1711 01/04/18 1532   Gait Training PT LTG   Gait Training Goal PT LTG, Date Established 12/28/17 --    Gait Training Goal PT LTG, Time to Achieve 2 wks --    Gait Training Goal PT LTG, [place] Level minimum assist (75% patient effort) --    Gait Training Goal PT LTG, Assist Device walker, rolling --    Gait Training Goal PT LTG, Distance to Achieve 25 --    Gait Training Goal PT LTG, Outcome --  goal ongoing

## 2018-01-04 NOTE — PLAN OF CARE
Problem: Patient Care Overview (Adult)  Goal: Plan of Care Review  Outcome: Ongoing (interventions implemented as appropriate)   01/04/18 1610   Coping/Psychosocial Response Interventions   Plan Of Care Reviewed With patient   Patient Care Overview   Progress progress toward functional goals as expected   Outcome Evaluation   Outcome Summary/Follow up Plan VSS, pt tolerating physical therapy, pt still on high flow oxygen, pain control       Problem: COPD, Chronic Bronchitis/Emphysema (Adult)  Goal: Signs and Symptoms of Listed Potential Problems Will be Absent or Manageable (COPD, Chronic Bronchitis/Emphysema)  Outcome: Ongoing (interventions implemented as appropriate)      Problem: Respiratory Insufficiency (Adult)  Goal: Acid/Base Balance  Outcome: Ongoing (interventions implemented as appropriate)    Goal: Effective Ventilation  Outcome: Ongoing (interventions implemented as appropriate)

## 2018-01-04 NOTE — PLAN OF CARE
Problem: Patient Care Overview (Adult)  Goal: Plan of Care Review  Outcome: Ongoing (interventions implemented as appropriate)   01/03/18 1601 01/03/18 2000   Coping/Psychosocial Response Interventions   Plan Of Care Reviewed With --  patient   Patient Care Overview   Progress progress toward functional goals as expected --    Outcome Evaluation   Outcome Summary/Follow up Plan pt needing assistance with ADLs, tolerating diet, pain control --      Goal: Adult Individualization and Mutuality  Outcome: Ongoing (interventions implemented as appropriate)    Goal: Discharge Needs Assessment  Outcome: Ongoing (interventions implemented as appropriate)      Problem: COPD, Chronic Bronchitis/Emphysema (Adult)  Goal: Signs and Symptoms of Listed Potential Problems Will be Absent or Manageable (COPD, Chronic Bronchitis/Emphysema)  Outcome: Ongoing (interventions implemented as appropriate)

## 2018-01-05 LAB
ALBUMIN SERPL-MCNC: 2.9 G/DL (ref 3.5–5)
ALBUMIN/GLOB SERPL: 1.3 G/DL (ref 1–2)
ALP SERPL-CCNC: 57 U/L (ref 38–126)
ALT SERPL W P-5'-P-CCNC: 58 U/L (ref 13–69)
ANION GAP SERPL CALCULATED.3IONS-SCNC: 7 MMOL/L
AST SERPL-CCNC: 52 U/L (ref 15–46)
BASOPHILS # BLD AUTO: 0.03 10*3/MM3 (ref 0–0.2)
BASOPHILS NFR BLD AUTO: 0.2 % (ref 0–2.5)
BILIRUB SERPL-MCNC: 0.6 MG/DL (ref 0.2–1.3)
BUN BLD-MCNC: 36 MG/DL (ref 7–20)
BUN/CREAT SERPL: 40 (ref 6.3–21.9)
CALCIUM SPEC-SCNC: 9 MG/DL (ref 8.4–10.2)
CHLORIDE SERPL-SCNC: 104 MMOL/L (ref 98–107)
CO2 SERPL-SCNC: 33 MMOL/L (ref 26–30)
CREAT BLD-MCNC: 0.9 MG/DL (ref 0.6–1.3)
DEPRECATED RDW RBC AUTO: 41.8 FL (ref 37–54)
EOSINOPHIL # BLD AUTO: 0.11 10*3/MM3 (ref 0–0.7)
EOSINOPHIL NFR BLD AUTO: 0.6 % (ref 0–7)
ERYTHROCYTE [DISTWIDTH] IN BLOOD BY AUTOMATED COUNT: 12.4 % (ref 11.5–14.5)
GFR SERPL CREATININE-BSD FRML MDRD: 84 ML/MIN/1.73
GLOBULIN UR ELPH-MCNC: 2.2 GM/DL
GLUCOSE BLD-MCNC: 83 MG/DL (ref 74–98)
GLUCOSE BLDC GLUCOMTR-MCNC: 155 MG/DL (ref 70–130)
GLUCOSE BLDC GLUCOMTR-MCNC: 181 MG/DL (ref 70–130)
GLUCOSE BLDC GLUCOMTR-MCNC: 90 MG/DL (ref 70–130)
HCT VFR BLD AUTO: 35.5 % (ref 42–52)
HGB BLD-MCNC: 11 G/DL (ref 14–18)
IMM GRANULOCYTES # BLD: 0.12 10*3/MM3 (ref 0–0.06)
IMM GRANULOCYTES NFR BLD: 0.7 % (ref 0–0.6)
LYMPHOCYTES # BLD AUTO: 0.98 10*3/MM3 (ref 0.6–3.4)
LYMPHOCYTES NFR BLD AUTO: 5.6 % (ref 10–50)
MCH RBC QN AUTO: 28.8 PG (ref 27–31)
MCHC RBC AUTO-ENTMCNC: 31 G/DL (ref 30–37)
MCV RBC AUTO: 92.9 FL (ref 80–94)
MONOCYTES # BLD AUTO: 1.73 10*3/MM3 (ref 0–0.9)
MONOCYTES NFR BLD AUTO: 9.9 % (ref 0–12)
NEUTROPHILS # BLD AUTO: 14.58 10*3/MM3 (ref 2–6.9)
NEUTROPHILS NFR BLD AUTO: 83 % (ref 37–80)
NRBC BLD MANUAL-RTO: 0 /100 WBC (ref 0–0)
PLATELET # BLD AUTO: 196 10*3/MM3 (ref 130–400)
PMV BLD AUTO: 9.6 FL (ref 6–12)
POTASSIUM BLD-SCNC: 4 MMOL/L (ref 3.5–5.1)
PROT SERPL-MCNC: 5.1 G/DL (ref 6.3–8.2)
RBC # BLD AUTO: 3.82 10*6/MM3 (ref 4.7–6.1)
SODIUM BLD-SCNC: 140 MMOL/L (ref 137–145)
WBC NRBC COR # BLD: 17.55 10*3/MM3 (ref 4.8–10.8)

## 2018-01-05 PROCEDURE — 97535 SELF CARE MNGMENT TRAINING: CPT

## 2018-01-05 PROCEDURE — 94660 CPAP INITIATION&MGMT: CPT

## 2018-01-05 PROCEDURE — 80053 COMPREHEN METABOLIC PANEL: CPT | Performed by: INTERNAL MEDICINE

## 2018-01-05 PROCEDURE — 25010000002 ENOXAPARIN PER 10 MG: Performed by: INTERNAL MEDICINE

## 2018-01-05 PROCEDURE — 82962 GLUCOSE BLOOD TEST: CPT

## 2018-01-05 PROCEDURE — 94799 UNLISTED PULMONARY SVC/PX: CPT

## 2018-01-05 PROCEDURE — 97110 THERAPEUTIC EXERCISES: CPT

## 2018-01-05 PROCEDURE — 25010000002 METHYLPREDNISOLONE PER 40 MG: Performed by: INTERNAL MEDICINE

## 2018-01-05 PROCEDURE — 99233 SBSQ HOSP IP/OBS HIGH 50: CPT | Performed by: INTERNAL MEDICINE

## 2018-01-05 PROCEDURE — 85025 COMPLETE CBC W/AUTO DIFF WBC: CPT | Performed by: INTERNAL MEDICINE

## 2018-01-05 PROCEDURE — 25010000002 LEVOFLOXACIN PER 250 MG: Performed by: INTERNAL MEDICINE

## 2018-01-05 RX ADMIN — METHYLPREDNISOLONE SODIUM SUCCINATE 40 MG: 40 INJECTION, POWDER, FOR SOLUTION INTRAMUSCULAR; INTRAVENOUS at 05:44

## 2018-01-05 RX ADMIN — LACTULOSE 30 G: 20 SOLUTION ORAL at 08:01

## 2018-01-05 RX ADMIN — PANTOPRAZOLE SODIUM 40 MG: 40 TABLET, DELAYED RELEASE ORAL at 06:08

## 2018-01-05 RX ADMIN — LOSARTAN POTASSIUM 50 MG: 50 TABLET, FILM COATED ORAL at 08:02

## 2018-01-05 RX ADMIN — TRAZODONE HYDROCHLORIDE 50 MG: 50 TABLET ORAL at 20:30

## 2018-01-05 RX ADMIN — OXYBUTYNIN CHLORIDE 5 MG: 5 TABLET ORAL at 06:08

## 2018-01-05 RX ADMIN — FINASTERIDE 5 MG: 5 TABLET, FILM COATED ORAL at 08:05

## 2018-01-05 RX ADMIN — AMLODIPINE BESYLATE 5 MG: 5 TABLET ORAL at 08:05

## 2018-01-05 RX ADMIN — MIRTAZAPINE 15 MG: 15 TABLET, FILM COATED ORAL at 20:30

## 2018-01-05 RX ADMIN — DOCUSATE SODIUM AND SENNOSIDES 2 TABLET: 8.6; 5 TABLET, FILM COATED ORAL at 20:30

## 2018-01-05 RX ADMIN — CLOPIDOGREL BISULFATE 75 MG: 75 TABLET ORAL at 08:04

## 2018-01-05 RX ADMIN — HYDROCODONE POLISTIREX AND CHLORPHENIRAMINE POLISTIREX 5 ML: 10; 8 SUSPENSION, EXTENDED RELEASE ORAL at 19:17

## 2018-01-05 RX ADMIN — GUAIFENESIN AND DEXTROMETHORPHAN HYDROBROMIDE 2 TABLET: 600; 30 TABLET, EXTENDED RELEASE ORAL at 08:05

## 2018-01-05 RX ADMIN — DULOXETINE HYDROCHLORIDE 30 MG: 30 CAPSULE, DELAYED RELEASE ORAL at 08:04

## 2018-01-05 RX ADMIN — BUDESONIDE 0.5 MG: 0.5 INHALANT RESPIRATORY (INHALATION) at 20:12

## 2018-01-05 RX ADMIN — IPRATROPIUM BROMIDE AND ALBUTEROL SULFATE 3 ML: .5; 3 SOLUTION RESPIRATORY (INHALATION) at 12:32

## 2018-01-05 RX ADMIN — LEVOFLOXACIN 750 MG: 5 INJECTION, SOLUTION INTRAVENOUS at 09:14

## 2018-01-05 RX ADMIN — METAXALONE 400 MG: 800 TABLET ORAL at 20:30

## 2018-01-05 RX ADMIN — BUDESONIDE 0.5 MG: 0.5 INHALANT RESPIRATORY (INHALATION) at 07:30

## 2018-01-05 RX ADMIN — Medication 10 ML: at 20:45

## 2018-01-05 RX ADMIN — TAMSULOSIN HYDROCHLORIDE 0.4 MG: 0.4 CAPSULE ORAL at 20:30

## 2018-01-05 RX ADMIN — POLYETHYLENE GLYCOL 3350 17 G: 17 POWDER, FOR SOLUTION ORAL at 08:01

## 2018-01-05 RX ADMIN — CARVEDILOL 3.12 MG: 3.12 TABLET, FILM COATED ORAL at 08:04

## 2018-01-05 RX ADMIN — CARVEDILOL 3.12 MG: 3.12 TABLET, FILM COATED ORAL at 20:30

## 2018-01-05 RX ADMIN — MEGESTROL ACETATE 800 MG: 40 SUSPENSION ORAL at 08:04

## 2018-01-05 RX ADMIN — PANTOPRAZOLE SODIUM 40 MG: 40 TABLET, DELAYED RELEASE ORAL at 17:13

## 2018-01-05 RX ADMIN — ATORVASTATIN CALCIUM 40 MG: 40 TABLET, FILM COATED ORAL at 08:04

## 2018-01-05 RX ADMIN — IPRATROPIUM BROMIDE AND ALBUTEROL SULFATE 3 ML: .5; 3 SOLUTION RESPIRATORY (INHALATION) at 20:12

## 2018-01-05 RX ADMIN — IPRATROPIUM BROMIDE AND ALBUTEROL SULFATE 3 ML: .5; 3 SOLUTION RESPIRATORY (INHALATION) at 07:30

## 2018-01-05 RX ADMIN — METAXALONE 400 MG: 800 TABLET ORAL at 09:58

## 2018-01-05 RX ADMIN — ENOXAPARIN SODIUM 40 MG: 40 INJECTION SUBCUTANEOUS at 20:30

## 2018-01-05 RX ADMIN — GUAIFENESIN AND DEXTROMETHORPHAN HYDROBROMIDE 2 TABLET: 600; 30 TABLET, EXTENDED RELEASE ORAL at 20:30

## 2018-01-05 NOTE — PLAN OF CARE
Problem: Patient Care Overview (Adult)  Goal: Plan of Care Review  Outcome: Ongoing (interventions implemented as appropriate)   01/05/18 1707 01/05/18 1747   Coping/Psychosocial Response Interventions   Plan Of Care Reviewed With patient --    Patient Care Overview   Progress improving --    Outcome Evaluation   Outcome Summary/Follow up Plan --  PT tx completed: pt was able to tolerate sitting at EOB ~ 13 min and perform seated TE. he was returned to bed with min A x1, and left semireclined in supine with needs within reach. cont as indicated per POC        Problem: Inpatient Physical Therapy  Goal: Bed Mobility Goal LTG- PT  Outcome: Ongoing (interventions implemented as appropriate)   12/28/17 1711 01/05/18 1747   Bed Mobility PT LTG   Bed Mobility PT LTG, Date Established 12/28/17 --    Bed Mobility PT LTG, Time to Achieve 2 wks --    Bed Mobility PT LTG, Activity Type all bed mobility --    Bed Mobility PT LTG, [place] Level minimum assist (75% patient effort) --    Bed Mobility PT LTG, Outcome --  goal partially met     Goal: Transfer Training Goal 1 LTG- PT  Outcome: Ongoing (interventions implemented as appropriate)   12/28/17 1711 01/05/18 1747   Transfer Training PT LTG   Transfer Training PT LTG, Date Established 12/28/17 --    Transfer Training PT LTG, Time to Achieve 2 wks --    Transfer Training PT LTG, Activity Type sit to stand/stand to sit;bed to chair /chair to bed --    Transfer Training PT LTG, [place] Level minimum assist (75% patient effort) --    Transfer Training PT LTG, Assist Device walker, rolling --    Transfer Training PT LTG, Outcome --  goal ongoing     Goal: Gait Training Goal LTG- PT  Outcome: Ongoing (interventions implemented as appropriate)   12/28/17 1711 01/05/18 1747   Gait Training PT LTG   Gait Training Goal PT LTG, Date Established 12/28/17 --    Gait Training Goal PT LTG, Time to Achieve 2 wks --    Gait Training Goal PT LTG, [place] Level minimum assist  (75% patient effort) --    Gait Training Goal PT LTG, Assist Device walker, rolling --    Gait Training Goal PT LTG, Distance to Achieve 25 --    Gait Training Goal PT LTG, Outcome --  goal ongoing

## 2018-01-05 NOTE — PROGRESS NOTES
[facility]    [facility]    Name:  Thurman Mendel Parsons   Age:  67 y.o.  Sex:  male  :  1950  MRN:  [id number]   Visit Number:  [id number]  Admission Date:  2017  Date Of Service:  18  Primary Care Physician:  Miguel Rojas MD     LOS: 12 days :  Patient Care Team:  Miguel Rojas MD as PCP - General  Miguel Rojas MD as PCP - Family Medicine:    History taken from:     patient    Chief Complaint:      Dyspnea    Subjective     Interval History:     Patient seen again today.  Patient is a 67-year-old male with history of chronic respiratory failure secondary to COPD, PEDRO and hypertension.  He came in with acute on chronic hypoxic hypercapnic respiratory failure.  He has bilateral pneumonia treated with vancomycin, Fortaz and Levaquin which are now discontinued.  He is also on Solu-Medrol.  He is on high flow oxygen as well as BiPAP at night.  He continues to improve on a daily basis.  His sciatica pain improved with muscle relaxer.  He is able to work with physical therapy.  He denies any chest pressure, nausea, vomiting or diarrhea.  He did have some bowel movements last night.  Be eating more at present.  His oxygen requirements continue to decrease.    Review of Systems:     All systems were reviewed and negative except for:  Respiratory: positive for  cough, productive and shortness of air    Objective     Vital Signs:    Temp:  [97.4 °F (36.3 °C)-97.9 °F (36.6 °C)] 97.5 °F (36.4 °C)  Heart Rate:  [68-93] 78  Resp:  [18-24] 20  BP: ()/(53-74) 110/53    Physical Exam:      General Appearance:    Alert, cooperative, in no acute distress   Head:    Normocephalic, without obvious abnormality, atraumatic   Eyes:            Lids and lashes normal, conjunctivae and sclerae normal, no   icterus, no pallor, corneas clear, PERRLA   Ears:    Ears appear intact with no abnormalities noted   Throat:   No oral lesions, no thrush, oral mucosa moist   Neck:   No  adenopathy, supple, trachea midline, no thyromegaly, no   carotid bruit, no JVD   Back:     No kyphosis present, no scoliosis present, no skin lesions,      erythema or scars, no tenderness to percussion or                   palpation,   range of motion normal   Lungs:     Rhonchi bilaterally without uses accessory muscles respiration.      Heart:    Regular rhythm and normal rate, normal S1 and S2, no            murmur, no gallop, no rub, no click   Chest Wall:    No abnormalities observed   Abdomen:     Normal bowel sounds, no masses, no organomegaly, soft        non-tender, non-distended, no guarding, no rebound                tenderness   Rectal:     Deferred   Extremities:   4/5 strength in upper/lower extremities bilaterally.     Pulses:   Pulses palpable and equal bilaterally   Skin:   No bleeding, bruising or rash   Lymph nodes:   No palpable adenopathy   Neurologic:   Cranial nerves 2 - 12 grossly intact, sensation intact, DTR       present and equal bilaterally        Results Review:      I reviewed the patient's new clinical results.    Labs:    Lab Results (last 24 hours)     Procedure Component Value Units Date/Time    POC Glucose Once [[id number]]  (Normal) Collected:  01/04/18 2048    Specimen:  Blood Updated:  01/04/18 2056     Glucose 130 mg/dL       Serial Number: [id number]:  [id number]       POC Glucose Once [[id number]]  (Normal) Collected:  01/05/18 0618    Specimen:  Blood Updated:  01/05/18 0626     Glucose 90 mg/dL       Serial Number: [id number]:  [id number]       CBC & Differential [[id number]] Collected:  01/05/18 0552    Specimen:  Blood Updated:  01/05/18 0627    Narrative:       The following orders were created for panel order CBC & Differential.  Procedure                               Abnormality         Status                     ---------                               -----------         ------                     CBC Auto Differential[[id number]]        Abnormal             Final result                 Please view results for these tests on the individual orders.    CBC Auto Differential [[id number]]  (Abnormal) Collected:  01/05/18 0552    Specimen:  Blood Updated:  01/05/18 0627     WBC 17.55 (H) 10*3/mm3      RBC 3.82 (L) 10*6/mm3      Hemoglobin 11.0 (L) g/dL      Hematocrit 35.5 (L) %      MCV 92.9 fL      MCH 28.8 pg      MCHC 31.0 g/dL      RDW 12.4 %      RDW-SD 41.8 fl      MPV 9.6 fL      Platelets 196 10*3/mm3      Neutrophil % 83.0 (H) %      Lymphocyte % 5.6 (L) %      Monocyte % 9.9 %      Eosinophil % 0.6 %      Basophil % 0.2 %      Immature Grans % 0.7 (H) %      Neutrophils, Absolute 14.58 (H) 10*3/mm3      Lymphocytes, Absolute 0.98 10*3/mm3      Monocytes, Absolute 1.73 (H) 10*3/mm3      Eosinophils, Absolute 0.11 10*3/mm3      Basophils, Absolute 0.03 10*3/mm3      Immature Grans, Absolute 0.12 (H) 10*3/mm3      nRBC 0.0 /100 WBC     Comprehensive Metabolic Panel [[id number]]  (Abnormal) Collected:  01/05/18 0552    Specimen:  Blood Updated:  01/05/18 0641     Glucose 83 mg/dL      BUN 36 (H) mg/dL      Creatinine 0.90 mg/dL      Sodium 140 mmol/L      Potassium 4.0 mmol/L      Chloride 104 mmol/L      CO2 33.0 (H) mmol/L      Calcium 9.0 mg/dL      Total Protein 5.1 (L) g/dL      Albumin 2.90 (L) g/dL      ALT (SGPT) 58 U/L      AST (SGOT) 52 (H) U/L      Alkaline Phosphatase 57 U/L      Total Bilirubin 0.6 mg/dL      eGFR Non African Amer 84 mL/min/1.73      Globulin 2.2 gm/dL      A/G Ratio 1.3 g/dL      BUN/Creatinine Ratio 40.0 (H)     Anion Gap 7.0 mmol/L     Narrative:       Abnormal estimated GFR should be followed by more specific studies to confirm end stage chronic renal disease. The equation used for calculation may not be accurate for patients less than 19 years old, greater than 70 years old, patients at extremes of weight, malnutrition, or with acute renal dysfunction.    POC Glucose Once [[id number]]  (Abnormal) Collected:  01/05/18 1101    Specimen:   Blood Updated:  01/05/18 1111     Glucose 155 (H) mg/dL       Serial Number: [id number]:  [id number]              Radiology:    Imaging Results (last 24 hours)     ** No results found for the last 24 hours. **          Medication Review:       amLODIPine 5 mg Oral Q24H   atorvastatin 40 mg Oral Daily   bisacodyl 10 mg Rectal Daily   budesonide 0.5 mg Nebulization BID - RT   carvedilol 3.125 mg Oral Q12H   CHAPSTICK 1 each Apply externally BID   clopidogrel 75 mg Oral Daily   DULoxetine 30 mg Oral Daily   enoxaparin 40 mg Subcutaneous Q24H   finasteride 5 mg Oral Daily   fluticasone 1 spray Nasal Daily   guaifenesin-dextromethorphan 2 tablet Oral BID   ipratropium-albuterol 3 mL Nebulization Q6H While Awake - RT   losartan 50 mg Oral Daily   megestrol acetate 800 mg Oral Daily   metaxalone 400 mg Oral TID   methylPREDNISolone sodium succinate 40 mg Intravenous Q24H   mirtazapine 15 mg Oral Nightly   oxybutynin 5 mg Oral QAM   pantoprazole 40 mg Oral BID AC   polyethylene glycol 17 g Oral Daily   sennosides-docusate sodium 2 tablet Oral Nightly   tamsulosin 0.4 mg Oral Nightly   traZODone 50 mg Oral Nightly            Assessment/Plan     Problem List Items Addressed This Visit     Impaired mobility and ADLs    Acute exacerbation of chronic obstructive pulmonary disease (COPD) - Primary    Relevant Medications    BREO ELLIPTA 200-25 MCG/INH aerosol powder     PROAIR  (90 BASE) MCG/ACT inhaler    budesonide (PULMICORT) 0.5 MG/2ML nebulizer solution    ipratropium-albuterol (DUO-NEB) 0.5-2.5 mg/mL nebulizer    predniSONE (DELTASONE) 10 MG tablet    benzonatate (TESSALON) 200 MG capsule    HYDROcod Polst-CPM Polst ER (TUSSIONEX PENNKINETIC) 10-8 MG/5ML ER suspension    pseudoephedrine-guaifenesin (MUCINEX D)  MG per 12 hr tablet    fluticasone (FLONASE) 50 MCG/ACT nasal spray    predniSONE (DELTASONE) 10 MG tablet      Other Visit Diagnoses     Acute on chronic respiratory failure with hypercapnia               Acute exacerbation of chronic obstructive pulmonary disease (COPD)  Bilateral pneumonia, present on admission  Elevated troponin, Suspect type II MI  Hypertension  Right bundle-branch block  Obstructive sleep apnea with noncompliance to BiPAP  Acute on chronic hypoxic respiratory failure With hypercapnia  Left hip pain  Decreased oral intake  Sciatica    Plan:    Continue Solu-Medrol.  Continue DuoNeb, Pulmicort.  Wean FiO2 down by 5% every 12 hours per Dr. Lopez.  Once it is wean down to around 20, could switch to normal nasal cannula around 6 L and see if he tolerates.  Continue with BiPAP at night.  Once the patient is on nasal cannula and BiPAP at night him a could be sent to rehabilitation.  Communicated this to the patient as well as the son.  Check lab work in the a.m.  Continue with PT and OT.  Further recommendations will depend on the clinical course.    Joshua Mendez,   01/05/18  2:55 PM

## 2018-01-05 NOTE — PROGRESS NOTES
Continued Stay Note  CARLOS Shane     Patient Name: Thurman Mendel Parsons  MRN: [id number]  Today's Date: 1/5/2018    Admit Date: 12/24/2017          Discharge Plan       01/05/18 1408    Case Management/Social Work Plan    Plan [facility] status. Reviewed with CM. Patient is still sick and can go to [place] when medically ready.               Discharge Codes     None            Deana Jarvis

## 2018-01-05 NOTE — PLAN OF CARE
Problem: COPD, Chronic Bronchitis/Emphysema (Adult)  Goal: Signs and Symptoms of Listed Potential Problems Will be Absent or Manageable (COPD, Chronic Bronchitis/Emphysema)  Outcome: Ongoing (interventions implemented as appropriate)

## 2018-01-05 NOTE — PLAN OF CARE
Problem: Patient Care Overview (Adult)  Goal: Plan of Care Review  Outcome: Ongoing (interventions implemented as appropriate)   01/05/18 1707   Coping/Psychosocial Response Interventions   Plan Of Care Reviewed With patient   Patient Care Overview   Progress improving   Outcome Evaluation   Outcome Summary/Follow up Plan OT tx completed. Patient performed bed mobility tasks with SBA; functional transfers with CGA-Min A; UB bathing task with CGA; LBB task with min A and grooming task with min A EOB. Patient completed EOB to chair transfer using RW with CGA-Min A. O2 ranged from 93; 89; 90 during tx on high flow O2. Patient continues to present deficits in strength, endurance and balance requiring additional skilled OT services.        Problem: Inpatient Occupational Therapy  Goal: Strength Goal LTG- OT  Outcome: Ongoing (interventions implemented as appropriate)   12/28/17 1404 01/05/18 1707   Strength OT LTG   Strength Goal OT LTG, Date Established 12/28/17 --    Strength Goal OT LTG, Time to Achieve by discharge --    Strength Goal OT LTG, Functional Goal Pt will perform 15 reps UE ex progressing from AROM to strengthening as pt tolerates --    Strength Goal OT LTG, Date Goal Reviewed --  01/05/18   Strength Goal OT LTG, Outcome --  goal ongoing     Goal: LB Dressing Goal LTG- OT  Outcome: Ongoing (interventions implemented as appropriate)   12/28/17 1404 01/05/18 1707   LB Dressing OT LTG   LB Dressing Goal OT LTG, Date Established 12/28/17 --    LB Dressing Goal OT LTG, Time to Achieve by discharge --    LB Dressing Goal OT LTG, [place] Level moderate assist (50% patient effort) --    LB Dressing Goal OT LTG, Date Goal Reviewed --  01/05/18   LB Dressing Goal OT LTG, Outcome --  goal ongoing     Goal: UB Dressing Goal LTG- OT  Outcome: Ongoing (interventions implemented as appropriate)   12/28/17 1404 01/05/18 1707   UB Dressing OT LTG   UB Dressing Goal OT LTG, Date Established 12/28/17 --    UB Dressing  Goal OT LTG, Time to Achieve by discharge --    UB Dressing Goal OT LTG, [place] Level minimum assist (75% patient effort) --    UB Dressing Goal OT LTG, Date Goal Reviewed --  01/05/18   UB Dressing Goal OT LTG, Outcome --  goal ongoing

## 2018-01-05 NOTE — THERAPY TREATMENT NOTE
Acute Care - Physical Therapy Treatment Note   [place]     Patient Name: Thurman Mendel Parsons  : 1950  MRN: [id number]  Today's Date: 2018  Onset of Illness/Injury or Date of Surgery Date: 17  Date of Referral to PT: 17  Referring Physician: Dr. Huber    Admit Date: 2017    Visit Dx:    ICD-10-CM ICD-9-CM   1. Acute exacerbation of chronic obstructive pulmonary disease (COPD) J44.1 491.21   2. Acute on chronic respiratory failure with hypercapnia J96.22 518.84   3. Impaired mobility and ADLs Z74.09 799.89   4. Impaired functional mobility, balance, gait, and endurance Z74.09 V49.89     Patient Active Problem List   Diagnosis   • Anxiety   • Atherosclerotic heart disease of native coronary artery without angina pectoris   • Atrial fibrillation   • Chronic kidney disease, stage III (moderate)   • Steroid-dependent COPD   • Degeneration of cervical intervertebral disc   • Diabetes mellitus   • GERD without esophagitis   • Diverticulosis   • Hemorrhoids   • Hiatal hernia   • Hyperlipidemia   • Hypertension   • Kyphosis, acquired   • Mitral and aortic valve disease   • Phimosis   • Sleep apnea   • Enlarged prostate without lower urinary tract symptoms (luts)   • History of arthritis   • Back pain   • Depression   • Stroke syndrome   • History of ventricular septal defect   • Neck pain   • Impaired mobility and ADLs   • Physical deconditioning   • Acute exacerbation of chronic obstructive pulmonary disease (COPD)               Adult Rehabilitation Note       18 1603 18 0344 18 1417    Rehab Assessment/Intervention    Discipline occupational therapist  -SD physical therapy assistant  -CK occupational therapist  -SD    Document Type therapy note (daily note)  -SD therapy note (daily note)  -CK therapy note (daily note)  -SD    Subjective Information agree to therapy;complains of;pain  -SD agree to therapy;complains of;fatigue;pain  -CK agree to therapy;complains  of;weakness;dyspnea  -SD    Patient Effort, Rehab Treatment good  -SD adequate  -CK adequate  -SD    Symptoms Noted During/After Treatment fatigue;shortness of breath  -SD fatigue;shortness of breath  -CK fatigue;shortness of breath  -SD    Precautions/Limitations fall precautions;oxygen therapy device and L/min  -SD fall precautions;oxygen therapy device and L/min  -CK fall precautions;oxygen therapy device and L/min  -SD    Recorded by [SD] Kelle Edwards OT [CK] Jessie Davenport PTA [SD] Kelle Edwards OT    Vital Signs    Pre SpO2 (%) 93  -SD  94  -SD    O2 Delivery Pre Treatment supplemental O2   High flow  -SD supplemental O2  -CK supplemental O2   High flow  -SD    Intra SpO2 (%) 89  -SD      O2 Delivery Intra Treatment supplemental O2   high flow  -SD supplemental O2  -CK     Post SpO2 (%) 90  -SD  92  -SD    O2 Delivery Post Treatment supplemental O2   high flow  -SD supplemental O2  -CK supplemental O2   high flow  -SD    Recorded by [SD] Kelle Edwards OT [CK] Jessie Davenport, HELADIO [SD] Kelle Edwards OT    Pain Assessment    Pain Assessment 0-10  -SD 0-10  -CK 0-10  -SD    Pain Score 6  -SD 6  -CK 6  -SD    Post Pain Score 6  -SD 6  -CK 6  -SD    Pain Type Acute pain  -SD Acute pain  -CK Acute pain  -SD    Pain Location Hip  -SD Hip  -CK Hip  -SD    Pain Orientation Left  -SD Left  -CK Left  -SD    Pain Intervention(s) Repositioned  -SD Repositioned  -CK Repositioned;Ambulation/increased activity  -SD    Response to Interventions Tolerated  -SD --   tolerated  -CK Tolerated  -SD    Recorded by [SD] Kelle Edwards OT [CK] Jessie Davenport, HELADIO [SD] Kelle Edwards OT    Bed Mobility, Assessment/Treatment    Bed Mobility, Assistive Device bed rails;head of bed elevated  -SD      Bed Mob, Supine to Sit, [place] supervision required  -SD      Bed Mob, Sit to Supine, [place]  contact guard assist  -CK     Bed Mobility, Impairments  strength decreased;impaired balance  -CK     Recorded by  [SD] Kelle Edwards, OT [CK] Jessie Davenport, PTA     Transfer Assessment/Treatment    Transfers, Bed-Chair [place] contact guard assist  -SD  contact guard assist  -SD    Transfers, Bed-Chair-Bed, Assist Device rolling walker  -SD      Transfers, Sit-Stand [place] contact guard assist  -SD  contact guard assist  -SD    Transfers, Stand-Sit [place] contact guard assist  -SD  contact guard assist  -SD    Transfers, Sit-Stand-Sit, Assist Device rolling walker  -SD      Toilet Transfer, [place]   contact guard assist  -SD    Transfer, Comment Patient completed several sit to stand transfers EOB during bathing task using RW  -SD      Recorded by [SD] Kelle Edwards OT  [SD] Kelle Edwards OT    Functional Mobility    Functional Mobility- Ind. Level contact guard assist  -SD  contact guard assist  -SD    Functional Mobility- Device rolling walker  -SD  --   HHA  -SD    Functional Mobility-Distance (Feet) 5  -SD  4  -SD    Recorded by [SD] Kelle Edwards OT  [SD] Kelle Edwards OT    Upper Body Bathing Assessment/Training    UB Bathing Assess/Train, [place] Level minimum assist (75% patient effort)  -SD      Recorded by [SD] Kelle Edwards OT      Lower Body Bathing Assessment/Training    LB Bathing Assess/Train, [place] Level moderate assist (50% patient effort)  -SD      Recorded by [SD] Kelle Edwards OT      Lower Body Dressing Assessment/Training    LB Dressing Assess/Train, [place] minimum assist (75% patient effort)  -SD      Recorded by [SD] Kelle Edwards OT      Toileting Assessment/Training    Toileting Assess/Train, [place] Level   minimum assist (75% patient effort)  -SD    Recorded by   [SD] Kelle Edwarsd OT    Grooming Assessment/Training    Grooming Assess/Train, [place] Level minimum assist (75% patient effort)  -SD      Grooming Assess/Train, Comment Min A for shaving for safety tp prevent cuts  -SD      Recorded by [SD] Kelle Edwards, OT       Therapy Exercises    Bilateral Lower Extremities  AROM:;15 reps;sitting;ankle pumps/circles;hip abduction/adduction;LAQ  -CK     Bilateral Upper Extremity AROM:;15 reps;elbow flexion/extension;shoulder extension/flexion  -SD  AROM:;10 reps;elbow flexion/extension;shoulder abduction/adduction;shoulder extension/flexion  -SD    Recorded by [SD] Kelle Edwards OT [CK] Jessie Davenport PTA [SD] Kelle Edwards OT    Positioning and Restraints    Pre-Treatment Position in bed  -SD sitting in chair/recliner  -CK in bed  -SD    Post Treatment Position chair  -SD bed  -CK chair  -SD    In Chair sitting;call light within reach;encouraged to call for assist  -SD encouraged to call for assist;call light within reach  -CK reclined;call light within reach;encouraged to call for assist  -SD    Recorded by [SD] Kelle Edwards OT [CK] Jessie Davenport PTA [SD] Kelle Edwards OT      01/04/18 1416 01/03/18 1157 01/03/18 1155    Rehab Assessment/Intervention    Discipline physical therapist  -LM occupational therapist  -SD physical therapist  -LM    Document Type therapy note (daily note)  -LM therapy note (daily note)  -SD therapy note (daily note)  -LM    Subjective Information agree to therapy;complains of;weakness;dyspnea  -LM agree to therapy;complains of;weakness;pain  -SD agree to therapy;complains of;weakness;fatigue;dyspnea  -LM    Patient Effort, Rehab Treatment adequate  -LM adequate  -SD adequate  -LM    Symptoms Noted During/After Treatment fatigue;shortness of breath  -LM fatigue;shortness of breath  -SD fatigue;shortness of breath  -LM    Precautions/Limitations fall precautions;oxygen therapy device and L/min  -LM fall precautions;oxygen therapy device and L/min  -SD fall precautions;oxygen therapy device and L/min  -LM    Recorded by [LM] Xochitl Hernandez, PT [SD] Kelle Edwards OT [LM] Xochitl Hernandez, PT    Vital Signs    Pre SpO2 (%) 94  -LM 92  -SD 92  -LM    O2 Delivery Pre Treatment supplemental O2   Hi-marc   -LM supplemental O2  -SD supplemental O2   Hi-marc  -LM    Intra SpO2 (%)  87  -SD 87  -LM    O2 Delivery Intra Treatment  supplemental O2  -SD supplemental O2   Hi-marc  -LM    Post SpO2 (%) 92  -LM 92  -SD 92  -LM    O2 Delivery Post Treatment supplemental O2   Hi-marc  -LM supplemental O2  -SD supplemental O2   Hi-marc  -LM    Recorded by [LM] Xochitl Hernandez, PT [SD] Kelle Edwards OT [LM] Xochitl Hernandez, PT    Pain Assessment    Pain Assessment 0-10  -LM 0-10  -SD 0-10  -LM    Pain Score 6  -LM 8  -SD 8  -LM    Post Pain Score 6  -LM 8  -SD 8  -LM    Pain Type Acute pain  -LM Acute pain  -SD Acute pain  -LM    Pain Location Hip  -LM Hip  -SD Hip  -LM    Pain Orientation Left  -LM Right  -SD Left  -LM    Pain Intervention(s) Repositioned;Ambulation/increased activity  -LM Repositioned  -SD Repositioned;Ambulation/increased activity  -LM    Response to Interventions Tolerated.  -LM Tolerated  -SD Tolerated.  -LM    Recorded by [LM] Xochitl Hernandez, PT [SD] Kelle Edwards, OT [LM] Xochitl Hernandez, PT    Bed Mobility, Assessment/Treatment    Bed Mobility, Assistive Device bed rails;head of bed elevated  -LM bed rails;head of bed elevated  -SD bed rails;head of bed elevated  -LM    Bed Mob, Supine to Sit, [place] supervision required  -LM minimum assist (75% patient effort)  -SD minimum assist (75% patient effort)  -LM    Bed Mobility, Safety Issues   decreased use of legs for bridging/pushing  -LM    Bed Mobility, Impairments strength decreased;impaired balance;pain  -LM  strength decreased;impaired balance;pain  -LM    Recorded by [LM] Xochitl Hernandez, PT [SD] Kelle Edwards, OT [LM] Xochitl Hernandez, PT    Transfer Assessment/Treatment    Transfers, Bed-Chair [place] contact guard assist;hand held assist  -LM  moderate assist (50% patient effort);hand held assist  -LM    Transfers, Chair-Bed [place]  moderate assist (50% patient effort)  -SD     Transfers, Sit-Stand [place] contact guard assist;hand held assist   -LM moderate assist (50% patient effort)  -SD moderate assist (50% patient effort);hand held assist  -LM    Transfers, Stand-Sit [place] contact guard assist;hand held assist  -LM moderate assist (50% patient effort)  -SD moderate assist (50% patient effort);hand held assist  -LM    Toilet Transfer, [place] contact guard assist  -LM      Transfer, Safety Issues balance decreased during turns;sequencing ability decreased;step length decreased;weight-shifting ability decreased  -LM  balance decreased during turns;sequencing ability decreased;step length decreased;weight-shifting ability decreased  -LM    Transfer, Impairments strength decreased;impaired balance;pain  -LM  strength decreased;impaired balance;pain  -LM    Recorded by [LM] Xochitl Hernandez PT [SD] Kelle Edwards OT [LM] Xochitl Hernandez PT    Gait Assessment/Treatment    Gait, [place] Level contact guard assist;hand held assist  -LM  moderate assist (50% patient effort);hand held assist  -LM    Gait, Distance (Feet) 4  -LM  3  -LM    Gait, Gait Pattern Analysis   swing-to gait  -LM    Gait, Gait Deviations left:;antalgic;forward flexed posture;step length decreased;toe-to-floor clearance decreased  -LM  pati decreased;forward flexed posture;step length decreased;toe-to-floor clearance decreased  -LM    Gait, Safety Issues balance decreased during turns;sequencing ability decreased;step length decreased;weight-shifting ability decreased;supplemental O2  -LM  balance decreased during turns;sequencing ability decreased;step length decreased;weight-shifting ability decreased;supplemental O2  -LM    Gait, Impairments strength decreased;impaired balance;pain  -LM  strength decreased;impaired balance;pain  -LM    Recorded by [LM] Xochitl Hernandez PT  [LM] Xochitl Hernandez PT    Upper Body Bathing Assessment/Training    UB Bathing Assess/Train, [place] Level  moderate assist (50% patient effort)  -SD     Recorded by  [SD] Kelle Edwards, OT      Lower Body Bathing Assessment/Training    LB Bathing Assess/Train, [place] Level  maximum assist (25% patient effort)  -SD     Recorded by  [SD] Kelle Edwards, OT     Lower Body Dressing Assessment/Training    LB Dressing Assess/Train, [place]  maximum assist (25% patient effort)  -SD     Recorded by  [SD] Kelle Edwards OT     Therapy Exercises    Bilateral Lower Extremities AAROM:;10 reps;sitting;ankle pumps/circles;LAQ;other reps   seated marches  -LM  AAROM:;15 reps;sitting;ankle pumps/circles;AROM:;LAQ;other reps   seated marches  -LM    Recorded by [LM] Xochitl Hernandez, PT  [LM] Xochitl Hernandez, PT    Positioning and Restraints    Pre-Treatment Position in bed  -LM in bed  -SD in bed  -LM    Post Treatment Position chair  -LM chair  -SD chair  -LM    In Bed   sitting;call light within reach;encouraged to call for assist;with family/caregiver  -LM    In Chair reclined;call light within reach;encouraged to call for assist  -LM sitting;call light within reach;encouraged to call for assist;with family/caregiver  -SD     Recorded by [LM] Xochitl Hernandez, PT [SD] Kelle Edwards OT [LM] Xochitl Hernandez, PT      User Key  (r) = Recorded By, (t) = Taken By, (c) = Cosigned By    Initials Name Effective Dates    LM Xochitl Hernandez, PT 10/26/16 -     CK Jessie Davenport, PTA 10/26/16 -     SD Kelle Edwards OT 06/30/17 -                 IP PT Goals       01/05/18 1747 01/04/18 1532 01/03/18 1329    Bed Mobility PT LTG    Bed Mobility PT LTG, Outcome goal partially met  -CK goal partially met  -LM goal ongoing  -LM    Transfer Training PT LTG    Transfer Training PT LTG, Outcome goal ongoing  -CK goal partially met  -LM goal ongoing  -LM    Gait Training PT LTG    Gait Training Goal PT LTG, Outcome goal ongoing  -CK goal ongoing  -LM goal ongoing  -LM      01/02/18 1551 12/31/17 1532 12/28/17 1711    Bed Mobility PT LTG    Bed Mobility PT LTG, Date Established   12/28/17  -JR    Bed Mobility PT LTG, Time to Achieve   2  wks  -JR    Bed Mobility PT LTG, Activity Type   all bed mobility  -JR    Bed Mobility PT LTG, [place] Level   minimum assist (75% patient effort)  -JR    Bed Mobility PT LTG, Outcome goal ongoing  -RM goal ongoing  -JR     Transfer Training PT LTG    Transfer Training PT LTG, Date Established   12/28/17  -JR    Transfer Training PT LTG, Time to Achieve   2 wks  -JR    Transfer Training PT LTG, Activity Type   sit to stand/stand to sit;bed to chair /chair to bed  -JR    Transfer Training PT LTG, [place] Level   minimum assist (75% patient effort)  -JR    Transfer Training PT LTG, Assist Device   walker, rolling  -JR    Transfer Training PT LTG, Outcome goal ongoing  -RM goal ongoing  -JR     Gait Training PT LTG    Gait Training Goal PT LTG, Date Established   12/28/17  -JR    Gait Training Goal PT LTG, Time to Achieve   2 wks  -JR    Gait Training Goal PT LTG, [place] Level   minimum assist (75% patient effort)  -JR    Gait Training Goal PT LTG, Assist Device   walker, rolling  -JR    Gait Training Goal PT LTG, Distance to Achieve   25  -JR    Gait Training Goal PT LTG, Outcome  goal ongoing  -JR       User Key  (r) = Recorded By, (t) = Taken By, (c) = Cosigned By    Initials Name Provider Type    JR Kaylee Kurtz, PT Physical Therapist    FRANCESCO Hernandez, PT Physical Therapist    CARMELO Davenport, PTA Physical Therapy Assistant     Zenon Rothman, PTA Physical Therapy Assistant          Physical Therapy Education     Title: PT OT SLP Therapies (Active)     Topic: Physical Therapy (Active)     Point: Mobility training (Done)    Learning Progress Summary    Learner Readiness Method Response Comment Documented by Status   Patient Acceptance E VU  CK 01/05/18 1747 Done    Acceptance E VU Ther ex for HEP; safety techniques for transfers. LM 01/04/18 1532 Done    Acceptance E VU Purpose of PT; PLB/pacing to alleviate SOA with activity. LM 01/03/18 1328 Done    Acceptance E,D ROBERT,NR  RM 01/02/18 8703  Done    Acceptance E NR   01/02/18 0334 Active    Acceptance E VU Importance of mobility to recovery JR 12/28/17 1706 Done   Family Acceptance E VU Purpose of PT; PLB/pacing to alleviate SOA with activity.  01/03/18 1328 Done               Point: Home exercise program (Done)    Learning Progress Summary    Learner Readiness Method Response Comment Documented by Status   Patient Acceptance E VU  CK 01/05/18 1747 Done    Acceptance E VU Ther ex for HEP; safety techniques for transfers.  01/04/18 1532 Done    Acceptance E VU Purpose of PT; PLB/pacing to alleviate SOA with activity.  01/03/18 1328 Done    Acceptance E,D VU,NR   01/02/18 1550 Done    Acceptance E NR   01/02/18 0334 Active    Acceptance E VU instructed on the importance of exercises to improving mobility  12/31/17 1523 Done   Family Acceptance E VU Purpose of PT; PLB/pacing to alleviate SOA with activity.  01/03/18 1328 Done    Acceptance E VU instructed on the importance of exercises to improving mobility  12/31/17 1523 Done               Point: Precautions (Done)    Learning Progress Summary    Learner Readiness Method Response Comment Documented by Status   Patient Acceptance E VU  CK 01/05/18 1747 Done    Acceptance E VU Ther ex for HEP; safety techniques for transfers.  01/04/18 1532 Done    Acceptance E VU Purpose of PT; PLB/pacing to alleviate SOA with activity.  01/03/18 1328 Done   Family Acceptance E VU Purpose of PT; PLB/pacing to alleviate SOA with activity.  01/03/18 1328 Done                      User Key     Initials Effective Dates Name Provider Type Discipline     10/26/16 -  Kaylee Kurtz, PT Physical Therapist PT     10/26/16 -  Xochitl Hernandez, PT Physical Therapist PT     10/26/16 -  Jessie Davenport PTA Physical Therapy Assistant PT     10/26/16 -  Zenon Rothman, PTA Physical Therapy Assistant PT     08/31/17 -  Mayda Nguyen, RN Registered Nurse Nurse                    PT Recommendation and  Plan  Anticipated Discharge Disposition: inpatient rehabilitation facility  Planned Therapy Interventions: balance training, strengthening, bed mobility training, transfer training, gait training, patient/family education  PT Frequency: daily  Plan of Care Review  Outcome Summary/Follow up Plan: PT tx completed: pt was able to tolerate sitting at EOB ~ 13 min and perform seated TE. he was returned to bed with min A x1, and left semireclined in supine with needs within reach. cont  as indicated per POC           Outcome Measures       01/05/18 1603 01/05/18 0344 01/04/18 1417    How much help from another person do you currently need...    Turning from your back to your side while in flat bed without using bedrails?  3  -CK     Moving from lying on back to sitting on the side of a flat bed without bedrails?  3  -CK     Moving to and from a bed to a chair (including a wheelchair)?  3  -CK     Standing up from a chair using your arms (e.g., wheelchair, bedside chair)?  3  -CK     Climbing 3-5 steps with a railing?  1  -CK     To walk in hospital room?  2  -CK     AM-PAC 6 Clicks Score  15  -CK     How much help from another is currently needed...    Putting on and taking off regular lower body clothing? 3  -SD  2  -SD    Bathing (including washing, rinsing, and drying) 3  -SD  2  -SD    Toileting (which includes using toilet bed pan or urinal) 3  -SD  3  -SD    Putting on and taking off regular upper body clothing 3  -SD  2  -SD    Taking care of personal grooming (such as brushing teeth) 3  -SD  3  -SD    Eating meals 4  -SD  3  -SD    Score 19  -SD  15  -SD    Functional Assessment    Outcome Measure Options AM-PAC 6 Clicks Daily Activity (OT)  -SD AM-PAC 6 Clicks Basic Mobility (PT)  -CK AM-PAC 6 Clicks Daily Activity (OT)  -SD      01/04/18 1416 01/03/18 1157 01/03/18 1155    How much help from another person do you currently need...    Turning from your back to your side while in flat bed without using bedrails? 3   -LM  2  -LM    Moving from lying on back to sitting on the side of a flat bed without bedrails? 3  -LM  2  -LM    Moving to and from a bed to a chair (including a wheelchair)? 3  -LM  2  -LM    Standing up from a chair using your arms (e.g., wheelchair, bedside chair)? 3  -LM  2  -LM    Climbing 3-5 steps with a railing? 1  -LM  1  -LM    To walk in hospital room? 2  -LM  1  -LM    AM-PAC 6 Clicks Score 15  -LM  10  -LM    How much help from another is currently needed...    Putting on and taking off regular lower body clothing?  2  -SD     Bathing (including washing, rinsing, and drying)  2  -SD     Toileting (which includes using toilet bed pan or urinal)  2  -SD     Putting on and taking off regular upper body clothing  2  -SD     Taking care of personal grooming (such as brushing teeth)  2  -SD     Eating meals  2  -SD     Score  12  -SD     Functional Assessment    Outcome Measure Options AM-PAC 6 Clicks Basic Mobility (PT)  -LM AM-PAC 6 Clicks Daily Activity (OT)  -SD AM-PAC 6 Clicks Basic Mobility (PT)  -LM      User Key  (r) = Recorded By, (t) = Taken By, (c) = Cosigned By    Initials Name Provider Type    LM Xochitl Hernandez, PT Physical Therapist    CARMELO Davenport PTA Physical Therapy Assistant    TONY Edwards, OT Occupational Therapist           Time Calculation:         PT Charges       01/05/18 1750          Time Calculation    Start Time 0344  -CK      PT Received On 01/05/18  -      Time Calculation- PT    Total Timed Code Minutes- PT 15 minute(s)  -CK        User Key  (r) = Recorded By, (t) = Taken By, (c) = Cosigned By    Initials Name Provider Type    CARMELO Davenport PTA Physical Therapy Assistant          Therapy Charges for Today     Code Description Service Date Service Provider Modifiers Qty    [id number] HC PT THER PROC EA 15 MIN 1/5/2018 Jessie Davenport PTA GP 1          PT G-Codes  Outcome Measure Options: AM-PAC 6 Clicks Daily Activity (OT)    Jessie Davenport  PTA  1/5/2018

## 2018-01-05 NOTE — PROGRESS NOTES
Continued Stay Note  CARLOS Shane     Patient Name: Thurman Mendel Parsons  MRN: [id number]  Today's Date: 1/5/2018    Admit Date: 12/24/2017          Discharge Plan       01/05/18 1510    Case Management/Social Work Plan    Additional Comments Spoke to pt he would like to go to [facility] rehab for [facility] term rehab  Called Natasha  will need to fax palcement packet when he is on 6 LNC or below  and using the BIPAP only for naps and HS       01/05/18 1408    Case Management/Social Work Plan    Plan L[facility] status. Reviewed with CM. Patient is still sick and can go to [facility] when medically ready.               Discharge Codes     None            Danielle Saldana

## 2018-01-05 NOTE — PLAN OF CARE
Problem: Patient Care Overview (Adult)  Goal: Plan of Care Review  Outcome: Ongoing (interventions implemented as appropriate)   01/05/18 0251   Coping/Psychosocial Response Interventions   Plan Of Care Reviewed With patient   Patient Care Overview   Progress improving   Outcome Evaluation   Outcome Summary/Follow up Plan Pt. denies any pain, Vital signs are within normal limits. BiPAP use at night. Will continue the plan of care per MD order

## 2018-01-06 LAB
ALBUMIN SERPL-MCNC: 2.9 G/DL (ref 3.5–5)
ANION GAP SERPL CALCULATED.3IONS-SCNC: 6.1 MMOL/L
BUN BLD-MCNC: 38 MG/DL (ref 7–20)
BUN/CREAT SERPL: 42.2 (ref 6.3–21.9)
CALCIUM SPEC-SCNC: 8.9 MG/DL (ref 8.4–10.2)
CHLORIDE SERPL-SCNC: 104 MMOL/L (ref 98–107)
CO2 SERPL-SCNC: 34 MMOL/L (ref 26–30)
CREAT BLD-MCNC: 0.9 MG/DL (ref 0.6–1.3)
DEPRECATED RDW RBC AUTO: 41.8 FL (ref 37–54)
ERYTHROCYTE [DISTWIDTH] IN BLOOD BY AUTOMATED COUNT: 12.3 % (ref 11.5–14.5)
GFR SERPL CREATININE-BSD FRML MDRD: 84 ML/MIN/1.73
GLUCOSE BLD-MCNC: 90 MG/DL (ref 74–98)
HCT VFR BLD AUTO: 34.2 % (ref 42–52)
HGB BLD-MCNC: 10.6 G/DL (ref 14–18)
MAGNESIUM SERPL-MCNC: 2 MG/DL (ref 1.6–2.3)
MCH RBC QN AUTO: 28.6 PG (ref 27–31)
MCHC RBC AUTO-ENTMCNC: 31 G/DL (ref 30–37)
MCV RBC AUTO: 92.4 FL (ref 80–94)
PHOSPHATE SERPL-MCNC: 2.5 MG/DL (ref 2.5–4.5)
PLATELET # BLD AUTO: 186 10*3/MM3 (ref 130–400)
PMV BLD AUTO: 9.5 FL (ref 6–12)
POTASSIUM BLD-SCNC: 4.1 MMOL/L (ref 3.5–5.1)
RBC # BLD AUTO: 3.7 10*6/MM3 (ref 4.7–6.1)
SODIUM BLD-SCNC: 140 MMOL/L (ref 137–145)
WBC NRBC COR # BLD: 18.83 10*3/MM3 (ref 4.8–10.8)

## 2018-01-06 PROCEDURE — 83735 ASSAY OF MAGNESIUM: CPT | Performed by: INTERNAL MEDICINE

## 2018-01-06 PROCEDURE — 80069 RENAL FUNCTION PANEL: CPT | Performed by: INTERNAL MEDICINE

## 2018-01-06 PROCEDURE — 85027 COMPLETE CBC AUTOMATED: CPT | Performed by: INTERNAL MEDICINE

## 2018-01-06 PROCEDURE — 25010000002 ENOXAPARIN PER 10 MG: Performed by: INTERNAL MEDICINE

## 2018-01-06 PROCEDURE — 25010000002 LORAZEPAM PER 2 MG: Performed by: INTERNAL MEDICINE

## 2018-01-06 PROCEDURE — 94799 UNLISTED PULMONARY SVC/PX: CPT

## 2018-01-06 PROCEDURE — 99232 SBSQ HOSP IP/OBS MODERATE 35: CPT | Performed by: INTERNAL MEDICINE

## 2018-01-06 PROCEDURE — 25010000002 METHYLPREDNISOLONE PER 40 MG: Performed by: INTERNAL MEDICINE

## 2018-01-06 PROCEDURE — 94660 CPAP INITIATION&MGMT: CPT

## 2018-01-06 PROCEDURE — 97530 THERAPEUTIC ACTIVITIES: CPT

## 2018-01-06 RX ORDER — HYDROCODONE BITARTRATE AND ACETAMINOPHEN 7.5; 325 MG/1; MG/1
1 TABLET ORAL EVERY 4 HOURS PRN
Status: DISCONTINUED | OUTPATIENT
Start: 2018-01-06 | End: 2018-01-11 | Stop reason: HOSPADM

## 2018-01-06 RX ADMIN — FINASTERIDE 5 MG: 5 TABLET, FILM COATED ORAL at 09:33

## 2018-01-06 RX ADMIN — GUAIFENESIN AND DEXTROMETHORPHAN HYDROBROMIDE 2 TABLET: 600; 30 TABLET, EXTENDED RELEASE ORAL at 21:20

## 2018-01-06 RX ADMIN — BUDESONIDE 0.5 MG: 0.5 INHALANT RESPIRATORY (INHALATION) at 18:54

## 2018-01-06 RX ADMIN — CARVEDILOL 3.12 MG: 3.12 TABLET, FILM COATED ORAL at 21:20

## 2018-01-06 RX ADMIN — LORAZEPAM 0.5 MG: 2 INJECTION INTRAMUSCULAR; INTRAVENOUS at 21:20

## 2018-01-06 RX ADMIN — METHYLPREDNISOLONE SODIUM SUCCINATE 40 MG: 40 INJECTION, POWDER, FOR SOLUTION INTRAMUSCULAR; INTRAVENOUS at 04:45

## 2018-01-06 RX ADMIN — PANTOPRAZOLE SODIUM 40 MG: 40 TABLET, DELAYED RELEASE ORAL at 06:00

## 2018-01-06 RX ADMIN — IPRATROPIUM BROMIDE AND ALBUTEROL SULFATE 3 ML: .5; 3 SOLUTION RESPIRATORY (INHALATION) at 12:45

## 2018-01-06 RX ADMIN — PANTOPRAZOLE SODIUM 40 MG: 40 TABLET, DELAYED RELEASE ORAL at 16:30

## 2018-01-06 RX ADMIN — METAXALONE 400 MG: 800 TABLET ORAL at 17:16

## 2018-01-06 RX ADMIN — OXYBUTYNIN CHLORIDE 5 MG: 5 TABLET ORAL at 06:00

## 2018-01-06 RX ADMIN — Medication 10 ML: at 04:45

## 2018-01-06 RX ADMIN — METAXALONE 400 MG: 800 TABLET ORAL at 21:20

## 2018-01-06 RX ADMIN — ALBUTEROL SULFATE 2.5 MG: 2.5 SOLUTION RESPIRATORY (INHALATION) at 00:01

## 2018-01-06 RX ADMIN — POLYETHYLENE GLYCOL 3350 17 G: 17 POWDER, FOR SOLUTION ORAL at 09:33

## 2018-01-06 RX ADMIN — ATORVASTATIN CALCIUM 40 MG: 40 TABLET, FILM COATED ORAL at 09:33

## 2018-01-06 RX ADMIN — Medication 10 ML: at 21:20

## 2018-01-06 RX ADMIN — IPRATROPIUM BROMIDE AND ALBUTEROL SULFATE 3 ML: .5; 3 SOLUTION RESPIRATORY (INHALATION) at 18:54

## 2018-01-06 RX ADMIN — BUDESONIDE 0.5 MG: 0.5 INHALANT RESPIRATORY (INHALATION) at 06:28

## 2018-01-06 RX ADMIN — TRAZODONE HYDROCHLORIDE 50 MG: 50 TABLET ORAL at 21:20

## 2018-01-06 RX ADMIN — IPRATROPIUM BROMIDE AND ALBUTEROL SULFATE 3 ML: .5; 3 SOLUTION RESPIRATORY (INHALATION) at 06:28

## 2018-01-06 RX ADMIN — ENOXAPARIN SODIUM 40 MG: 40 INJECTION SUBCUTANEOUS at 21:20

## 2018-01-06 RX ADMIN — DULOXETINE HYDROCHLORIDE 30 MG: 30 CAPSULE, DELAYED RELEASE ORAL at 09:33

## 2018-01-06 RX ADMIN — AMLODIPINE BESYLATE 5 MG: 5 TABLET ORAL at 09:33

## 2018-01-06 RX ADMIN — LOSARTAN POTASSIUM 50 MG: 50 TABLET, FILM COATED ORAL at 09:33

## 2018-01-06 RX ADMIN — MIRTAZAPINE 15 MG: 15 TABLET, FILM COATED ORAL at 21:20

## 2018-01-06 RX ADMIN — CARVEDILOL 3.12 MG: 3.12 TABLET, FILM COATED ORAL at 09:33

## 2018-01-06 RX ADMIN — GUAIFENESIN AND DEXTROMETHORPHAN HYDROBROMIDE 2 TABLET: 600; 30 TABLET, EXTENDED RELEASE ORAL at 09:33

## 2018-01-06 RX ADMIN — Medication 1 EACH: at 09:00

## 2018-01-06 RX ADMIN — CLOPIDOGREL BISULFATE 75 MG: 75 TABLET ORAL at 09:33

## 2018-01-06 RX ADMIN — FLUTICASONE PROPIONATE 1 SPRAY: 50 SPRAY, METERED NASAL at 09:39

## 2018-01-06 RX ADMIN — TAMSULOSIN HYDROCHLORIDE 0.4 MG: 0.4 CAPSULE ORAL at 21:20

## 2018-01-06 RX ADMIN — DOCUSATE SODIUM AND SENNOSIDES 2 TABLET: 8.6; 5 TABLET, FILM COATED ORAL at 21:20

## 2018-01-06 RX ADMIN — MEGESTROL ACETATE 800 MG: 40 SUSPENSION ORAL at 09:33

## 2018-01-06 NOTE — PLAN OF CARE
Problem: NPPV/CPAP (Adult)  Intervention: Monitor/Manage Anxiety Related to NPPV/CPAP   01/06/18 0036   Coping Strategies   Trust Relationship/Rapport care explained;choices provided     Intervention: Prevent Aspiration During Therapy   01/06/18 0036   Positioning   Head Of Bed (HOB) Position HOB elevated       Goal: Signs and Symptoms of Listed Potential Problems Will be Absent or Manageable (NPPV/CPAP)  Outcome: Ongoing (interventions implemented as appropriate)   01/06/18 0036   NPPV/CPAP   Problems Assessed (NPPV/CPAP) hypoxia/hypoxemia;dry mucous membranes;situational response;skin breakdown   Problems Present (NPPV/CPAP) none

## 2018-01-06 NOTE — PLAN OF CARE
Problem: Patient Care Overview (Adult)  Goal: Plan of Care Review  Outcome: Ongoing (interventions implemented as appropriate)   01/06/18 0901   Coping/Psychosocial Response Interventions   Plan Of Care Reviewed With patient   Patient Care Overview   Progress improving       Problem: NPPV/CPAP (Adult)  Goal: Signs and Symptoms of Listed Potential Problems Will be Absent or Manageable (NPPV/CPAP)  Outcome: Ongoing (interventions implemented as appropriate)   01/06/18 0901   NPPV/CPAP   Problems Assessed (NPPV/CPAP) all   Problems Present (NPPV/CPAP) none

## 2018-01-06 NOTE — PLAN OF CARE
Problem: Patient Care Overview (Adult)  Goal: Plan of Care Review  Outcome: Ongoing (interventions implemented as appropriate)   01/05/18 2030   Coping/Psychosocial Response Interventions   Plan Of Care Reviewed With patient   Patient Care Overview   Progress improving   Outcome Evaluation   Outcome Summary/Follow up Plan pleasant patient on high flow O2, BI-PAP at HS & PRN, monitor labs and continue to monitor patient     Goal: Adult Individualization and Mutuality  Outcome: Ongoing (interventions implemented as appropriate)    Goal: Discharge Needs Assessment  Outcome: Ongoing (interventions implemented as appropriate)      Problem: COPD, Chronic Bronchitis/Emphysema (Adult)  Goal: Signs and Symptoms of Listed Potential Problems Will be Absent or Manageable (COPD, Chronic Bronchitis/Emphysema)  Outcome: Ongoing (interventions implemented as appropriate)      Problem: Respiratory Insufficiency (Adult)  Goal: Acid/Base Balance  Outcome: Ongoing (interventions implemented as appropriate)    Goal: Effective Ventilation  Outcome: Ongoing (interventions implemented as appropriate)

## 2018-01-06 NOTE — PROGRESS NOTES
[facility]    Name:  Thurman Mendel Parsons   Age:  67 y.o.  Sex:  male  :  1950  MRN:  [id number]   Visit Number:  [id number]  Admission Date:  2017  Date Of Service:  18  Primary Care Physician:  Miguel Rojas MD     LOS: 13 days :  Patient Care Team:  Miguel Rojas MD as PCP - General  Miguel Rojas MD as PCP - Family Medicine:    History taken from:     patient    Chief Complaint:      Dyspnea    Subjective     Interval History:     Patient seen today.  Patient is a 67-year-old male with history of chronic respiratory failure secondary to COPD, PEDRO and hypertension.  He came in with acute on chronic hypoxic hypercapnic respiratory failure.  He has bilateral pneumonia treated with vancomycin, Fortaz and Levaquin which are now all discontinued.  He is also on Solu-Medrol.  He is on high flow oxygen as well as BiPAP at night.  He continues to improve on a daily basis.  His sciatica pain improved with muscle relaxer.   He still has back pain.  He is able to work with physical therapy.  He denies any chest pressure, nausea, vomiting or diarrhea.   Seems to be eating more present.  He became very anxious with reduction in oxygen this morning.  We'll try again tonight.    Review of Systems:     All systems were reviewed and negative except for:  Respiratory: positive for  shortness of air    Objective     Vital Signs:    Temp:  [97.9 °F (36.6 °C)-98.9 °F (37.2 °C)] 97.9 °F (36.6 °C)  Heart Rate:  [] 82  Resp:  [18-25] 22  BP: (102-117)/(57-68) 117/66    Physical Exam:      General Appearance:    Alert, cooperative, in no acute distress   Head:    Normocephalic, without obvious abnormality, atraumatic   Eyes:            Lids and lashes normal, conjunctivae and sclerae normal, no   icterus, no pallor, corneas clear, PERRLA   Ears:    Ears appear intact with no abnormalities noted   Throat:   No oral lesions, no thrush, oral mucosa moist   Neck:    No adenopathy, supple, trachea midline, no thyromegaly, no   carotid bruit, no JVD   Back:     No kyphosis present, no scoliosis present, no skin lesions,      erythema or scars, no tenderness to percussion or                   palpation,   range of motion normal   Lungs:    Rhonchi bilaterally without use of accessory muscles respiration.      Heart:    Regular rhythm and normal rate, normal S1 and S2, no            murmur, no gallop, no rub, no click   Chest Wall:    No abnormalities observed   Abdomen:     Normal bowel sounds, no masses, no organomegaly, soft        non-tender, non-distended, no guarding, no rebound                tenderness   Rectal:     Deferred   Extremities:   4/5 strength in upper/lower extremities bilaterally.     Pulses:   Pulses palpable and equal bilaterally   Skin:   No bleeding, bruising or rash   Lymph nodes:   No palpable adenopathy   Neurologic:   Cranial nerves 2 - 12 grossly intact, sensation intact, DTR       present and equal bilaterally        Results Review:      I reviewed the patient's new clinical results.    Labs:    Lab Results (last 24 hours)     Procedure Component Value Units Date/Time    POC Glucose Once [[id number]]  (Abnormal) Collected:  01/05/18 2119    Specimen:  Blood Updated:  01/05/18 2127     Glucose 181 (H) mg/dL       Serial Number: [id number]:  [id number]       CBC (No Diff) [[id number]]  (Abnormal) Collected:  01/06/18 0557    Specimen:  Blood Updated:  01/06/18 0628     WBC 18.83 (H) 10*3/mm3      RBC 3.70 (L) 10*6/mm3      Hemoglobin 10.6 (L) g/dL      Hematocrit 34.2 (L) %      MCV 92.4 fL      MCH 28.6 pg      MCHC 31.0 g/dL      RDW 12.3 %      RDW-SD 41.8 fl      MPV 9.5 fL      Platelets 186 10*3/mm3     Magnesium [[id number]]  (Normal) Collected:  01/06/18 0557    Specimen:  Blood Updated:  01/06/18 0635     Magnesium 2.0 mg/dL     Renal Function Panel [[id number]]  (Abnormal) Collected:  01/06/18 0557    Specimen:  Blood Updated:  01/06/18 0636      Glucose 90 mg/dL      BUN 38 (H) mg/dL      Creatinine 0.90 mg/dL      Sodium 140 mmol/L      Potassium 4.1 mmol/L      Chloride 104 mmol/L      CO2 34.0 (H) mmol/L      Calcium 8.9 mg/dL      Albumin 2.90 (L) g/dL      Phosphorus 2.5 mg/dL      Anion Gap 6.1 mmol/L      BUN/Creatinine Ratio 42.2 (H)     eGFR Non African Amer 84 mL/min/1.73     Narrative:       Abnormal estimated GFR should be followed by more specific studies to confirm end stage chronic renal disease. The equation used for calculation may not be accurate for patients less than 19 years old, greater than 70 years old, patients at extremes of weight, malnutrition, or with acute renal dysfunction.           Radiology:    Imaging Results (last 24 hours)     ** No results found for the last 24 hours. **          Medication Review:       amLODIPine 5 mg Oral Q24H   atorvastatin 40 mg Oral Daily   bisacodyl 10 mg Rectal Daily   budesonide 0.5 mg Nebulization BID - RT   carvedilol 3.125 mg Oral Q12H   CHAPSTICK 1 each Apply externally BID   clopidogrel 75 mg Oral Daily   DULoxetine 30 mg Oral Daily   enoxaparin 40 mg Subcutaneous Q24H   finasteride 5 mg Oral Daily   fluticasone 1 spray Nasal Daily   guaifenesin-dextromethorphan 2 tablet Oral BID   ipratropium-albuterol 3 mL Nebulization Q6H While Awake - RT   losartan 50 mg Oral Daily   megestrol acetate 800 mg Oral Daily   metaxalone 400 mg Oral TID   methylPREDNISolone sodium succinate 40 mg Intravenous Q24H   mirtazapine 15 mg Oral Nightly   oxybutynin 5 mg Oral QAM   pantoprazole 40 mg Oral BID AC   polyethylene glycol 17 g Oral Daily   sennosides-docusate sodium 2 tablet Oral Nightly   tamsulosin 0.4 mg Oral Nightly   traZODone 50 mg Oral Nightly            Assessment/Plan     Problem List Items Addressed This Visit     Impaired mobility and ADLs    Acute exacerbation of chronic obstructive pulmonary disease (COPD) - Primary    Relevant Medications    BREO ELLIPTA 200-25 MCG/INH aerosol powder      PROAIR  (90 BASE) MCG/ACT inhaler    budesonide (PULMICORT) 0.5 MG/2ML nebulizer solution    ipratropium-albuterol (DUO-NEB) 0.5-2.5 mg/mL nebulizer    predniSONE (DELTASONE) 10 MG tablet    benzonatate (TESSALON) 200 MG capsule    HYDROcod Polst-CPM Polst ER (TUSSIONEX PENNKINETIC) 10-8 MG/5ML ER suspension    pseudoephedrine-guaifenesin (MUCINEX D)  MG per 12 hr tablet    fluticasone (FLONASE) 50 MCG/ACT nasal spray    predniSONE (DELTASONE) 10 MG tablet      Other Visit Diagnoses     Acute on chronic respiratory failure with hypercapnia              Acute exacerbation of chronic obstructive pulmonary disease (COPD)  Bilateral pneumonia, present on admission  Elevated troponin, Suspect type II MI  Hypertension  Right bundle-branch block  Obstructive sleep apnea with noncompliance to BiPAP  Acute on chronic hypoxic respiratory failure With hypercapnia  Left hip pain  Decreased oral intake  Sciatica    Plan:    Continue to try to wean down FiO2.  Continue with Solu-Medrol, DuoNeb, Pulmicort.  Patient is off antibiotics.  Today with BiPAP at night.  Check lab work in the a.m.  Add Norco for pain.  Continue with PT and OT.  Further recommendations will depend on the clinical course.    Joshua Mendez DO  01/06/18  3:14 PM

## 2018-01-06 NOTE — PLAN OF CARE
Problem: Patient Care Overview (Adult)  Goal: Plan of Care Review  Outcome: Ongoing (interventions implemented as appropriate)   01/06/18 0901 01/06/18 1441   Coping/Psychosocial Response Interventions   Plan Of Care Reviewed With patient --    Patient Care Overview   Progress improving --    Outcome Evaluation   Outcome Summary/Follow up Plan --  PT tx completed: pt was able to participate in bed mobility, transfers and seated TE. he was left sitting up in a chair. cont as indicated per POC        Problem: Inpatient Physical Therapy  Goal: Bed Mobility Goal LTG- PT  Outcome: Ongoing (interventions implemented as appropriate)   12/28/17 1711 01/06/18 1441   Bed Mobility PT LTG   Bed Mobility PT LTG, Date Established 12/28/17 --    Bed Mobility PT LTG, Time to Achieve 2 wks --    Bed Mobility PT LTG, Activity Type all bed mobility --    Bed Mobility PT LTG, Wright Level minimum assist (75% patient effort) --    Bed Mobility PT LTG, Outcome --  goal partially met     Goal: Transfer Training Goal 1 LTG- PT  Outcome: Ongoing (interventions implemented as appropriate)   12/28/17 1711 01/06/18 1441   Transfer Training PT LTG   Transfer Training PT LTG, Date Established 12/28/17 --    Transfer Training PT LTG, Time to Achieve 2 wks --    Transfer Training PT LTG, Activity Type sit to stand/stand to sit;bed to chair /chair to bed --    Transfer Training PT LTG, Wright Level minimum assist (75% patient effort) --    Transfer Training PT LTG, Assist Device walker, rolling --    Transfer Training PT LTG, Outcome --  goal ongoing     Goal: Gait Training Goal LTG- PT  Outcome: Ongoing (interventions implemented as appropriate)   12/28/17 1711 01/06/18 1441   Gait Training PT LTG   Gait Training Goal PT LTG, Date Established 12/28/17 --    Gait Training Goal PT LTG, Time to Achieve 2 wks --    Gait Training Goal PT LTG, Wright Level minimum assist (75% patient effort) --    Gait Training Goal PT LTG, Assist  Device walker, rolling --    Gait Training Goal PT LTG, Distance to Achieve 25 --    Gait Training Goal PT LTG, Outcome --  goal ongoing

## 2018-01-07 LAB
ALBUMIN SERPL-MCNC: 3 G/DL (ref 3.5–5)
ANION GAP SERPL CALCULATED.3IONS-SCNC: 9.9 MMOL/L
BUN BLD-MCNC: 29 MG/DL (ref 7–20)
BUN/CREAT SERPL: 32.2 (ref 6.3–21.9)
CALCIUM SPEC-SCNC: 9 MG/DL (ref 8.4–10.2)
CHLORIDE SERPL-SCNC: 104 MMOL/L (ref 98–107)
CO2 SERPL-SCNC: 33 MMOL/L (ref 26–30)
CREAT BLD-MCNC: 0.9 MG/DL (ref 0.6–1.3)
DEPRECATED RDW RBC AUTO: 41.8 FL (ref 37–54)
ERYTHROCYTE [DISTWIDTH] IN BLOOD BY AUTOMATED COUNT: 12.4 % (ref 11.5–14.5)
GFR SERPL CREATININE-BSD FRML MDRD: 84 ML/MIN/1.73
GLUCOSE BLD-MCNC: 101 MG/DL (ref 74–98)
HCT VFR BLD AUTO: 33.8 % (ref 42–52)
HGB BLD-MCNC: 10.9 G/DL (ref 14–18)
MAGNESIUM SERPL-MCNC: 2.1 MG/DL (ref 1.6–2.3)
MCH RBC QN AUTO: 29.6 PG (ref 27–31)
MCHC RBC AUTO-ENTMCNC: 32.2 G/DL (ref 30–37)
MCV RBC AUTO: 91.8 FL (ref 80–94)
PHOSPHATE SERPL-MCNC: 3 MG/DL (ref 2.5–4.5)
PLATELET # BLD AUTO: 199 10*3/MM3 (ref 130–400)
PMV BLD AUTO: 9.1 FL (ref 6–12)
POTASSIUM BLD-SCNC: 3.9 MMOL/L (ref 3.5–5.1)
RBC # BLD AUTO: 3.68 10*6/MM3 (ref 4.7–6.1)
SODIUM BLD-SCNC: 143 MMOL/L (ref 137–145)
WBC NRBC COR # BLD: 14.62 10*3/MM3 (ref 4.8–10.8)

## 2018-01-07 PROCEDURE — 80069 RENAL FUNCTION PANEL: CPT | Performed by: INTERNAL MEDICINE

## 2018-01-07 PROCEDURE — 85027 COMPLETE CBC AUTOMATED: CPT | Performed by: INTERNAL MEDICINE

## 2018-01-07 PROCEDURE — 94799 UNLISTED PULMONARY SVC/PX: CPT

## 2018-01-07 PROCEDURE — 83735 ASSAY OF MAGNESIUM: CPT | Performed by: INTERNAL MEDICINE

## 2018-01-07 PROCEDURE — 94660 CPAP INITIATION&MGMT: CPT

## 2018-01-07 PROCEDURE — 25010000002 METHYLPREDNISOLONE PER 40 MG: Performed by: INTERNAL MEDICINE

## 2018-01-07 PROCEDURE — 99232 SBSQ HOSP IP/OBS MODERATE 35: CPT | Performed by: INTERNAL MEDICINE

## 2018-01-07 PROCEDURE — 97110 THERAPEUTIC EXERCISES: CPT

## 2018-01-07 PROCEDURE — 25010000002 LORAZEPAM PER 2 MG: Performed by: INTERNAL MEDICINE

## 2018-01-07 PROCEDURE — 25010000002 ENOXAPARIN PER 10 MG: Performed by: INTERNAL MEDICINE

## 2018-01-07 RX ADMIN — LOSARTAN POTASSIUM 50 MG: 50 TABLET, FILM COATED ORAL at 09:29

## 2018-01-07 RX ADMIN — MIRTAZAPINE 15 MG: 15 TABLET, FILM COATED ORAL at 21:05

## 2018-01-07 RX ADMIN — ATORVASTATIN CALCIUM 40 MG: 40 TABLET, FILM COATED ORAL at 09:29

## 2018-01-07 RX ADMIN — HYDROCODONE BITARTRATE AND ACETAMINOPHEN 1 TABLET: 7.5; 325 TABLET ORAL at 21:05

## 2018-01-07 RX ADMIN — IPRATROPIUM BROMIDE AND ALBUTEROL SULFATE 3 ML: .5; 3 SOLUTION RESPIRATORY (INHALATION) at 07:51

## 2018-01-07 RX ADMIN — METAXALONE 400 MG: 800 TABLET ORAL at 21:05

## 2018-01-07 RX ADMIN — FINASTERIDE 5 MG: 5 TABLET, FILM COATED ORAL at 09:28

## 2018-01-07 RX ADMIN — PANTOPRAZOLE SODIUM 40 MG: 40 TABLET, DELAYED RELEASE ORAL at 17:35

## 2018-01-07 RX ADMIN — ENOXAPARIN SODIUM 40 MG: 40 INJECTION SUBCUTANEOUS at 21:05

## 2018-01-07 RX ADMIN — DOCUSATE SODIUM AND SENNOSIDES 2 TABLET: 8.6; 5 TABLET, FILM COATED ORAL at 21:05

## 2018-01-07 RX ADMIN — OXYBUTYNIN CHLORIDE 5 MG: 5 TABLET ORAL at 06:00

## 2018-01-07 RX ADMIN — METAXALONE 400 MG: 800 TABLET ORAL at 17:35

## 2018-01-07 RX ADMIN — AMLODIPINE BESYLATE 5 MG: 5 TABLET ORAL at 09:29

## 2018-01-07 RX ADMIN — POLYETHYLENE GLYCOL 3350 17 G: 17 POWDER, FOR SOLUTION ORAL at 09:28

## 2018-01-07 RX ADMIN — TAMSULOSIN HYDROCHLORIDE 0.4 MG: 0.4 CAPSULE ORAL at 21:05

## 2018-01-07 RX ADMIN — GUAIFENESIN AND DEXTROMETHORPHAN HYDROBROMIDE 2 TABLET: 600; 30 TABLET, EXTENDED RELEASE ORAL at 21:20

## 2018-01-07 RX ADMIN — METAXALONE 400 MG: 800 TABLET ORAL at 09:28

## 2018-01-07 RX ADMIN — FLUTICASONE PROPIONATE 1 SPRAY: 50 SPRAY, METERED NASAL at 09:00

## 2018-01-07 RX ADMIN — Medication 10 ML: at 05:55

## 2018-01-07 RX ADMIN — PANTOPRAZOLE SODIUM 40 MG: 40 TABLET, DELAYED RELEASE ORAL at 05:55

## 2018-01-07 RX ADMIN — BUDESONIDE 0.5 MG: 0.5 INHALANT RESPIRATORY (INHALATION) at 07:52

## 2018-01-07 RX ADMIN — LORAZEPAM 0.5 MG: 2 INJECTION INTRAMUSCULAR; INTRAVENOUS at 21:05

## 2018-01-07 RX ADMIN — TRAZODONE HYDROCHLORIDE 50 MG: 50 TABLET ORAL at 21:05

## 2018-01-07 RX ADMIN — CARVEDILOL 3.12 MG: 3.12 TABLET, FILM COATED ORAL at 09:29

## 2018-01-07 RX ADMIN — DULOXETINE HYDROCHLORIDE 30 MG: 30 CAPSULE, DELAYED RELEASE ORAL at 09:29

## 2018-01-07 RX ADMIN — BUDESONIDE 0.5 MG: 0.5 INHALANT RESPIRATORY (INHALATION) at 19:52

## 2018-01-07 RX ADMIN — IPRATROPIUM BROMIDE AND ALBUTEROL SULFATE 3 ML: .5; 3 SOLUTION RESPIRATORY (INHALATION) at 19:52

## 2018-01-07 RX ADMIN — CLOPIDOGREL BISULFATE 75 MG: 75 TABLET ORAL at 09:29

## 2018-01-07 RX ADMIN — CARVEDILOL 3.12 MG: 3.12 TABLET, FILM COATED ORAL at 21:05

## 2018-01-07 RX ADMIN — MEGESTROL ACETATE 800 MG: 40 SUSPENSION ORAL at 09:29

## 2018-01-07 RX ADMIN — HYDROCODONE BITARTRATE AND ACETAMINOPHEN 1 TABLET: 7.5; 325 TABLET ORAL at 05:55

## 2018-01-07 RX ADMIN — IPRATROPIUM BROMIDE AND ALBUTEROL SULFATE 3 ML: .5; 3 SOLUTION RESPIRATORY (INHALATION) at 12:37

## 2018-01-07 RX ADMIN — GUAIFENESIN AND DEXTROMETHORPHAN HYDROBROMIDE 2 TABLET: 600; 30 TABLET, EXTENDED RELEASE ORAL at 09:28

## 2018-01-07 RX ADMIN — METHYLPREDNISOLONE SODIUM SUCCINATE 40 MG: 40 INJECTION, POWDER, FOR SOLUTION INTRAMUSCULAR; INTRAVENOUS at 05:55

## 2018-01-07 NOTE — THERAPY TREATMENT NOTE
Acute Care - Physical Therapy Treatment Note   [facility]     Patient Name: Thurman Mendel Parsons  : 1950  MRN: [id number]  Today's Date: 2018  Onset of Illness/Injury or Date of Surgery Date: 17  Date of Referral to PT: 17  Referring Physician: Dr. Huber    Admit Date: 2017    Visit Dx:    ICD-10-CM ICD-9-CM   1. Acute exacerbation of chronic obstructive pulmonary disease (COPD) J44.1 491.21   2. Acute on chronic respiratory failure with hypercapnia J96.22 518.84   3. Impaired mobility and ADLs Z74.09 799.89   4. Impaired functional mobility, balance, gait, and endurance Z74.09 V49.89     Patient Active Problem List   Diagnosis   • Anxiety   • Atherosclerotic heart disease of native coronary artery without angina pectoris   • Atrial fibrillation   • Chronic kidney disease, stage III (moderate)   • Steroid-dependent COPD   • Degeneration of cervical intervertebral disc   • Diabetes mellitus   • GERD without esophagitis   • Diverticulosis   • Hemorrhoids   • Hiatal hernia   • Hyperlipidemia   • Hypertension   • Kyphosis, acquired   • Mitral and aortic valve disease   • Phimosis   • Sleep apnea   • Enlarged prostate without lower urinary tract symptoms (luts)   • History of arthritis   • Back pain   • Depression   • Stroke syndrome   • History of ventricular septal defect   • Neck pain   • Impaired mobility and ADLs   • Physical deconditioning   • Acute exacerbation of chronic obstructive pulmonary disease (COPD)               Adult Rehabilitation Note       18 1551 18 0133 18 1603    Rehab Assessment/Intervention    Discipline physical therapy assistant  -CC physical therapy assistant  -CK occupational therapist  -SD    Document Type therapy note (daily note)  -CC therapy note (daily note)  -CK therapy note (daily note)  -SD    Subjective Information agree to therapy  -CC agree to therapy  -CK agree to therapy;complains of;pain  -SD    Patient Effort, Rehab Treatment  good  -CC good  -CK good  -SD    Symptoms Noted During/After Treatment shortness of breath  -CC shortness of breath  -CK fatigue;shortness of breath  -SD    Symptoms Noted Comment O2 SATS >96% during therapy  -CC      Precautions/Limitations fall precautions;oxygen therapy device and L/min  -CC fall precautions;oxygen therapy device and L/min  -CK fall precautions;oxygen therapy device and L/min  -SD    Recorded by [CC] Rebeca Sawyer PTA [CK] Jessie Davenport PTA [SD] Kelle Edwards, OT    Vital Signs    Pre SpO2 (%)   93  -SD    O2 Delivery Pre Treatment  supplemental O2  -CK supplemental O2   High flow  -SD    Intra SpO2 (%)   89  -SD    O2 Delivery Intra Treatment  supplemental O2  -CK supplemental O2   high flow  -SD    Post SpO2 (%)   90  -SD    O2 Delivery Post Treatment  supplemental O2  -CK supplemental O2   high flow  -SD    Recorded by  [CK] Jessie Davenport PTA [SD] Kelle Edwards OT    Pain Assessment    Pain Assessment 0-10  -CC 0-10  -CK 0-10  -SD    Pain Score 6  -CC 6  -CK 6  -SD    Post Pain Score 6  -CC 6  -CK 6  -SD    Pain Type Acute pain  -CC Acute pain  -CK Acute pain  -SD    Pain Location Hip  -CC Hip  -CK Hip  -SD    Pain Orientation Left  -CC Left  -CK Left  -SD    Pain Intervention(s) Repositioned  -CC Repositioned  -CK Repositioned  -SD    Response to Interventions  --   tolerated  -CK Tolerated  -SD    Recorded by [CC] Rebeca Sawyer PTA [CK] Jessie Davenport PTA [SD] Kelle Edwards, AKSH    Vision Assessment/Intervention    Visual Impairment WFL with corrective lenses  -CC      Recorded by [CC] Rebeca Sawyer PTA      Bed Mobility, Assessment/Treatment    Bed Mobility, Assistive Device  bed rails;head of bed elevated  -CK bed rails;head of bed elevated  -SD    Bed Mob, Supine to Sit, [place]  supervision required  -CK supervision required  -SD    Recorded by  [CK] Jessie Davenport PTA [SD] Kelle Edwards OT    Transfer Assessment/Treatment    Transfers, Bed-Chair  [place]  minimum assist (75% patient effort);contact guard assist  -CK contact guard assist  -SD    Transfers, Bed-Chair-Bed, Assist Device   rolling walker  -SD    Transfers, Sit-Stand [place]  contact guard assist  -CK contact guard assist  -SD    Transfers, Stand-Sit [place]  contact guard assist  -CK contact guard assist  -SD    Transfers, Sit-Stand-Sit, Assist Device  rolling walker  -CK rolling walker  -SD    Transfer, Comment   Patient completed several sit to stand transfers EOB during bathing task using RW  -SD    Recorded by  [CK] Jessie Davenport PTA [SD] Kelle Edwards OT    Gait Assessment/Treatment    Gait, [place] Level  minimum assist (75% patient effort)  -CK     Gait, Distance (Feet)  3  -CK     Gait, Gait Pattern Analysis  swing-to gait  -CK     Gait, Gait Deviations  double stance time increased;antalgic;left:;step length decreased;weight-shifting ability decreased  -CK     Gait, Comment  difficulty advancing L foot d/t pain in hip  -CK     Recorded by  [CK] Jessie Davenport PTA     Functional Mobility    Functional Mobility- Ind. Level   contact guard assist  -SD    Functional Mobility- Device   rolling walker  -SD    Functional Mobility-Distance (Feet)   5  -SD    Recorded by   [SD] Kelle Edwards OT    Upper Body Bathing Assessment/Training    UB Bathing Assess/Train, [place] Level   minimum assist (75% patient effort)  -SD    Recorded by   [SD] Kelle Edwards OT    Lower Body Bathing Assessment/Training    LB Bathing Assess/Train, [place] Level   moderate assist (50% patient effort)  -SD    Recorded by   [SD] Kelle Edwards OT    Lower Body Dressing Assessment/Training    LB Dressing Assess/Train, [place]   minimum assist (75% patient effort)  -SD    Recorded by   [SD] Kelle Edwards OT    Grooming Assessment/Training    Grooming Assess/Train, Indep[place] Level   minimum assist (75% patient effort)  -SD    Grooming Assess/Train, Comment   Min A for  shaving for safety tp prevent cuts  -SD    Recorded by   [SD] Kelle Edwards OT    Therapy Exercises    Bilateral Lower Extremities AROM:;15 reps;supine;ankle pumps/circles;glut sets;heel slides;hip abduction/adduction;quad sets;SAQ  -CC 15 reps;AROM:;sitting;ankle pumps/circles;hip abduction/adduction;hip flexion;LAQ  -CK     Bilateral Upper Extremity   AROM:;15 reps;elbow flexion/extension;shoulder extension/flexion  -SD    Recorded by [CC] Rebeca Sawyer PTA [CK] Jessie Davenport PTA [SD] Kelle Edwards OT    Positioning and Restraints    Pre-Treatment Position in bed  -CC in bed  -CK in bed  -SD    Post Treatment Position bed  -CC chair  -CK chair  -SD    In Bed fowlers;encouraged to call for assist  -CC sitting;encouraged to call for assist  -CK     In Chair   sitting;call light within reach;encouraged to call for assist  -SD    Recorded by [CC] Rebeca Sawyer PTA [CK] Jessie Davenport PTA [SD] Kelle Edwards OT      01/05/18 0344          Rehab Assessment/Intervention    Discipline physical therapy assistant  -CK      Document Type therapy note (daily note)  -CK      Subjective Information agree to therapy;complains of;fatigue;pain  -CK      Patient Effort, Rehab Treatment adequate  -CK      Symptoms Noted During/After Treatment fatigue;shortness of breath  -CK      Precautions/Limitations fall precautions;oxygen therapy device and L/min  -CK      Recorded by [CK] Jessie Davenport PTA      Vital Signs    O2 Delivery Pre Treatment supplemental O2  -CK      O2 Delivery Intra Treatment supplemental O2  -CK      O2 Delivery Post Treatment supplemental O2  -CK      Recorded by [CK] Jessie Davenport PTA      Pain Assessment    Pain Assessment 0-10  -CK      Pain Score 6  -CK      Post Pain Score 6  -CK      Pain Type Acute pain  -CK      Pain Location Hip  -CK      Pain Orientation Left  -CK      Pain Intervention(s) Repositioned  -CK      Response to Interventions --   tolerated  -CK      Recorded by  [CK] Jessie Davenport PTA      Bed Mobility, Assessment/Treatment    Bed Mob, Sit to Supine, [place] contact guard assist  -CK      Bed Mobility, Impairments strength decreased;impaired balance  -CK      Recorded by [CK] Jessie Davenport PTA      Therapy Exercises    Bilateral Lower Extremities AROM:;15 reps;sitting;ankle pumps/circles;hip abduction/adduction;LAQ  -CK      Recorded by [CK] Jessie Davenport PTA      Positioning and Restraints    Pre-Treatment Position sitting in chair/recliner  -CK      Post Treatment Position bed  -CK      In Chair encouraged to call for assist;call light within reach  -CK      Recorded by [CK] Jessie Davenport PTA        User Key  (r) = Recorded By, (t) = Taken By, (c) = Cosigned By    Initials Name Effective Dates    CC Rebeca Sawyer, PTA 10/26/16 -     CK Jessie Davenport, PTA 10/26/16 -     SD Kelle Edwards, OT 06/30/17 -                 IP PT Goals       01/07/18 1551 01/06/18 1441 01/05/18 1747    Bed Mobility PT LTG    Bed Mobility PT LTG, Outcome goal partially met  -CC goal partially met  -CK goal partially met  -CK    Transfer Training PT LTG    Transfer Training PT LTG, Outcome goal ongoing  -CC goal ongoing  -CK goal ongoing  -CK    Gait Training PT LTG    Gait Training Goal PT LTG, Outcome goal ongoing  -CC goal ongoing  -CK goal ongoing  -CK      01/04/18 1532 01/03/18 1329 01/02/18 1551    Bed Mobility PT LTG    Bed Mobility PT LTG, Outcome goal partially met  -LM goal ongoing  -LM goal ongoing  -RM    Transfer Training PT LTG    Transfer Training PT LTG, Outcome goal partially met  -LM goal ongoing  -LM goal ongoing  -RM    Gait Training PT LTG    Gait Training Goal PT LTG, Outcome goal ongoing  -LM goal ongoing  -LM       12/31/17 1532 12/28/17 1711       Bed Mobility PT LTG    Bed Mobility PT LTG, Date Established  12/28/17  -JR     Bed Mobility PT LTG, Time to Achieve  2 wks  -JR     Bed Mobility PT LTG, Activity Type  all bed mobility  -JR     Bed  Mobility PT LTG, [place] Level  minimum assist (75% patient effort)  -JR     Bed Mobility PT LTG, Outcome goal ongoing  -JR      Transfer Training PT LTG    Transfer Training PT LTG, Date Established  12/28/17  -JR     Transfer Training PT LTG, Time to Achieve  2 wks  -JR     Transfer Training PT LTG, Activity Type  sit to stand/stand to sit;bed to chair /chair to bed  -JR     Transfer Training PT LTG, [place] Level  minimum assist (75% patient effort)  -JR     Transfer Training PT LTG, Assist Device  walker, rolling  -JR     Transfer Training PT LTG, Outcome goal ongoing  -JR      Gait Training PT LTG    Gait Training Goal PT LTG, Date Established  12/28/17  -JR     Gait Training Goal PT LTG, Time to Achieve  2 wks  -JR     Gait Training Goal PT LTG, [place] Level  minimum assist (75% patient effort)  -JR     Gait Training Goal PT LTG, Assist Device  walker, rolling  -JR     Gait Training Goal PT LTG, Distance to Achieve  25  -JR     Gait Training Goal PT LTG, Outcome goal ongoing  -JR        User Key  (r) = Recorded By, (t) = Taken By, (c) = Cosigned By    Initials Name Provider Type    JR Kaylee Kurtz, PT Physical Therapist    LM Xochitl Hernandez, PT Physical Therapist    CC Rebeca Sawyer, PTA Physical Therapy Assistant    CK Jessie Davenport, PTA Physical Therapy Assistant    RM Zenon Rothman, PTA Physical Therapy Assistant          Physical Therapy Education     Title: PT OT SLP Therapies (Active)     Topic: Physical Therapy (Done)     Point: Mobility training (Done)    Learning Progress Summary    Learner Readiness Method Response Comment Documented by Status   Patient Acceptance E VU  CK 01/06/18 1441 Done    Acceptance E VU  CK 01/05/18 1747 Done    Acceptance E VU Ther ex for HEP; safety techniques for transfers.  01/04/18 1532 Done    Acceptance E VU Purpose of PT; PLB/pacing to alleviate SOA with activity. LM 01/03/18 1328 Done    Acceptance E,D VU,NR   01/02/18 1550 Done    Acceptance  E NR   01/02/18 0334 Active    Acceptance E VU Importance of mobility to recovery JR 12/28/17 1706 Done   Family Acceptance E VU Purpose of PT; PLB/pacing to alleviate SOA with activity.  01/03/18 1328 Done               Point: Home exercise program (Done)    Learning Progress Summary    Learner Readiness Method Response Comment Documented by Status   Patient Acceptance E VU  CK 01/06/18 1441 Done    Acceptance E VU  CK 01/05/18 1747 Done    Acceptance E VU Ther ex for HEP; safety techniques for transfers. LM 01/04/18 1532 Done    Acceptance E VU Purpose of PT; PLB/pacing to alleviate SOA with activity.  01/03/18 1328 Done    Acceptance E,D VU,NR   01/02/18 1550 Done    Acceptance E NR   01/02/18 0334 Active    Acceptance E VU instructed on the importance of exercises to improving mobility JR 12/31/17 1523 Done   Family Acceptance E VU Purpose of PT; PLB/pacing to alleviate SOA with activity.  01/03/18 1328 Done    Acceptance E VU instructed on the importance of exercises to improving mobility  12/31/17 1523 Done               Point: Body mechanics (Done)    Learning Progress Summary    Learner Readiness Method Response Comment Documented by Status   Patient Acceptance E VU  CK 01/06/18 1441 Done               Point: Precautions (Done)    Learning Progress Summary    Learner Readiness Method Response Comment Documented by Status   Patient Acceptance E VU  CK 01/06/18 1441 Done    Acceptance E VU  CK 01/05/18 1747 Done    Acceptance E VU Ther ex for HEP; safety techniques for transfers.  01/04/18 1532 Done    Acceptance E VU Purpose of PT; PLB/pacing to alleviate SOA with activity.  01/03/18 1328 Done   Family Acceptance E VU Purpose of PT; PLB/pacing to alleviate SOA with activity.  01/03/18 1328 Done                      User Key     Initials Effective Dates Name Provider Type Discipline     10/26/16 -  Kaylee Kurtz, PT Physical Therapist PT     10/26/16 -  Xochitl Hernandez, PT Physical Therapist  PT    CK 10/26/16 -  Jessie Davenport PTA Physical Therapy Assistant PT    RM 10/26/16 -  Zenon Rothman PTA Physical Therapy Assistant PT     08/31/17 -  Mayda Nguyen RN Registered Nurse Nurse                    PT Recommendation and Plan  Anticipated Discharge Disposition: inpatient rehabilitation facility  Planned Therapy Interventions: balance training, strengthening, bed mobility training, transfer training, gait training, patient/family education  PT Frequency: daily  Plan of Care Review  Plan Of Care Reviewed With: patient  Progress: improving  Outcome Summary/Follow up Plan: Pt requested to hold amb today d/t fatigue however was agreeable to perform ex . Con't with PT POC          Outcome Measures       01/06/18 0133 01/05/18 1603 01/05/18 0344    How much help from another person do you currently need...    Turning from your back to your side while in flat bed without using bedrails? 3  -CK  3  -CK    Moving from lying on back to sitting on the side of a flat bed without bedrails? 3  -CK  3  -CK    Moving to and from a bed to a chair (including a wheelchair)? 3  -CK  3  -CK    Standing up from a chair using your arms (e.g., wheelchair, bedside chair)? 3  -CK  3  -CK    Climbing 3-5 steps with a railing? 1  -CK  1  -CK    To walk in hospital room? 2  -CK  2  -CK    AM-PAC 6 Clicks Score 15  -CK  15  -CK    How much help from another is currently needed...    Putting on and taking off regular lower body clothing?  3  -SD     Bathing (including washing, rinsing, and drying)  3  -SD     Toileting (which includes using toilet bed pan or urinal)  3  -SD     Putting on and taking off regular upper body clothing  3  -SD     Taking care of personal grooming (such as brushing teeth)  3  -SD     Eating meals  4  -SD     Score  19  -SD     Functional Assessment    Outcome Measure Options AM-PAC 6 Clicks Basic Mobility (PT)  -CK AM-PAC 6 Clicks Daily Activity (OT)  -SD AM-PAC 6 Clicks Basic Mobility (PT)   -CK      User Key  (r) = Recorded By, (t) = Taken By, (c) = Cosigned By    Initials Name Provider Type    CARMELO Davenport PTA Physical Therapy Assistant    TONY Edwards, OT Occupational Therapist           Time Calculation:         PT Charges       01/07/18 1551          Time Calculation    Start Time 1551  -CC      PT Received On 01/07/18  -CC      PT Goal Re-Cert Due Date 01/07/18  -CC      Time Calculation- PT    Total Timed Code Minutes- PT 25 minute(s)  -CC        User Key  (r) = Recorded By, (t) = Taken By, (c) = Cosigned By    Initials Name Provider Type    CC Rebeca Sawyer PTA Physical Therapy Assistant          Therapy Charges for Today     Code Description Service Date Service Provider Modifiers Qty    28503989332 HC PT THER PROC EA 15 MIN 1/7/2018 Rebeca Sawyer PTA GP 1          PT G-Codes  Outcome Measure Options: AM-PAC 6 Clicks Basic Mobility (PT)    Rebeca Sawyer PTA  1/7/2018

## 2018-01-07 NOTE — PLAN OF CARE
Problem: Patient Care Overview (Adult)  Goal: Plan of Care Review  Outcome: Ongoing (interventions implemented as appropriate)   01/07/18 1508   Coping/Psychosocial Response Interventions   Plan Of Care Reviewed With patient   Patient Care Overview   Progress improving       Problem: NPPV/CPAP (Adult)  Goal: Signs and Symptoms of Listed Potential Problems Will be Absent or Manageable (NPPV/CPAP)  Outcome: Ongoing (interventions implemented as appropriate)   01/07/18 1508   NPPV/CPAP   Problems Assessed (NPPV/CPAP) all   Problems Present (NPPV/CPAP) none

## 2018-01-07 NOTE — PROGRESS NOTES
[facility]    Name:  Thurman Mendel Parsons   Age:  67 y.o.  Sex:  male  :  1950  MRN:  [id number]   Visit Number:  [id number]  Admission Date:  2017  Date Of Service:  18  Primary Care Physician:  Miguel Rojas MD     LOS: 14 days :  Patient Care Team:  Miguel Rojas MD as PCP - General  Miguel Rojas MD as PCP - Family Medicine:    History taken from:     patient    Chief Complaint:      Dyspnea    Subjective     Interval History:     Patient seen today.  Patient is a 67-year-old male with history of chronic respiratory failure secondary to COPD, PEDRO and hypertension.  He came in with acute on chronic hypoxic hypercapnic respiratory failure.  He has bilateral pneumonia treated with vancomycin, Fortaz and Levaquin which are now all discontinued.  He is also on Solu-Medrol.  He is on high flow oxygen as well as BiPAP at night.      He seems to be improving.  We'll attempt to wean down his oxygen again today, as he was anxious yesterday.  Denies any chest pressure, nausea, vomiting, or pain.  He is working with PT and OT.  Hopefully he can go to rehabilitation soon.    Review of Systems:     All systems were reviewed and negative except for:  Respiratory: positive for  shortness of air    Objective     Vital Signs:    Temp:  [97.4 °F (36.3 °C)-98.6 °F (37 °C)] 98.2 °F (36.8 °C)  Heart Rate:  [82-90] 90  Resp:  [18-24] 18  BP: (105-126)/(49-69) 105/49    Physical Exam:      General Appearance:    Alert, cooperative, in no acute distress   Head:    Normocephalic, without obvious abnormality, atraumatic   Eyes:            Lids and lashes normal, conjunctivae and sclerae normal, no   icterus, no pallor, corneas clear, PERRLA   Ears:    Ears appear intact with no abnormalities noted   Throat:   No oral lesions, no thrush, oral mucosa moist   Neck:   No adenopathy, supple, trachea midline, no thyromegaly, no   carotid bruit, no JVD   Back:     No  kyphosis present, no scoliosis present, no skin lesions,      erythema or scars, no tenderness to percussion or                   palpation,   range of motion normal   Lungs:    Rhonchi bilaterally without uses accessory muscles respiration.      Heart:    Regular rhythm and normal rate, normal S1 and S2, no            murmur, no gallop, no rub, no click   Chest Wall:    No abnormalities observed   Abdomen:     Normal bowel sounds, no masses, no organomegaly, soft        non-tender, non-distended, no guarding, no rebound                tenderness   Rectal:     Deferred   Extremities:   4/5 strength in upper/lower extremities bilaterally.     Pulses:   Pulses palpable and equal bilaterally   Skin:   No bleeding, bruising or rash   Lymph nodes:   No palpable adenopathy   Neurologic:   Cranial nerves 2 - 12 grossly intact, sensation intact, DTR       present and equal bilaterally        Results Review:      I reviewed the patient's new clinical results.    Labs:    Lab Results (last 24 hours)     Procedure Component Value Units Date/Time    CBC (No Diff) [[id number]]  (Abnormal) Collected:  01/07/18 0705    Specimen:  Blood Updated:  01/07/18 0715     WBC 14.62 (H) 10*3/mm3      RBC 3.68 (L) 10*6/mm3      Hemoglobin 10.9 (L) g/dL      Hematocrit 33.8 (L) %      MCV 91.8 fL      MCH 29.6 pg      MCHC 32.2 g/dL      RDW 12.4 %      RDW-SD 41.8 fl      MPV 9.1 fL      Platelets 199 10*3/mm3     Renal Function Panel [[id number]]  (Abnormal) Collected:  01/07/18 0705    Specimen:  Blood Updated:  01/07/18 0728     Glucose 101 (H) mg/dL      BUN 29 (H) mg/dL      Creatinine 0.90 mg/dL      Sodium 143 mmol/L      Potassium 3.9 mmol/L      Chloride 104 mmol/L      CO2 33.0 (H) mmol/L      Calcium 9.0 mg/dL      Albumin 3.00 (L) g/dL      Phosphorus 3.0 mg/dL      Anion Gap 9.9 mmol/L      BUN/Creatinine Ratio 32.2 (H)     eGFR Non African Amer 84 mL/min/1.73     Narrative:       Abnormal estimated GFR should be followed by more  specific studies to confirm end stage chronic renal disease. The equation used for calculation may not be accurate for patients less than 19 years old, greater than 70 years old, patients at extremes of weight, malnutrition, or with acute renal dysfunction.    Magnesium [[id number]]  (Normal) Collected:  01/07/18 0705    Specimen:  Blood Updated:  01/07/18 0728     Magnesium 2.1 mg/dL            Radiology:    Imaging Results (last 24 hours)     ** No results found for the last 24 hours. **          Medication Review:       amLODIPine 5 mg Oral Q24H   atorvastatin 40 mg Oral Daily   bisacodyl 10 mg Rectal Daily   budesonide 0.5 mg Nebulization BID - RT   carvedilol 3.125 mg Oral Q12H   CHAPSTICK 1 each Apply externally BID   clopidogrel 75 mg Oral Daily   DULoxetine 30 mg Oral Daily   enoxaparin 40 mg Subcutaneous Q24H   finasteride 5 mg Oral Daily   fluticasone 1 spray Nasal Daily   guaifenesin-dextromethorphan 2 tablet Oral BID   ipratropium-albuterol 3 mL Nebulization Q6H While Awake - RT   losartan 50 mg Oral Daily   megestrol acetate 800 mg Oral Daily   metaxalone 400 mg Oral TID   methylPREDNISolone sodium succinate 40 mg Intravenous Q24H   mirtazapine 15 mg Oral Nightly   oxybutynin 5 mg Oral QAM   pantoprazole 40 mg Oral BID AC   polyethylene glycol 17 g Oral Daily   sennosides-docusate sodium 2 tablet Oral Nightly   tamsulosin 0.4 mg Oral Nightly   traZODone 50 mg Oral Nightly            Assessment/Plan     Problem List Items Addressed This Visit     Impaired mobility and ADLs    Acute exacerbation of chronic obstructive pulmonary disease (COPD) - Primary    Relevant Medications    BREO ELLIPTA 200-25 MCG/INH aerosol powder     PROAIR  (90 BASE) MCG/ACT inhaler    budesonide (PULMICORT) 0.5 MG/2ML nebulizer solution    ipratropium-albuterol (DUO-NEB) 0.5-2.5 mg/mL nebulizer    predniSONE (DELTASONE) 10 MG tablet    benzonatate (TESSALON) 200 MG capsule    HYDROcod Polst-CPM Polst ER (TUSSIONEX  PENNKINETIC) 10-8 MG/5ML ER suspension    pseudoephedrine-guaifenesin (MUCINEX D)  MG per 12 hr tablet    fluticasone (FLONASE) 50 MCG/ACT nasal spray    predniSONE (DELTASONE) 10 MG tablet      Other Visit Diagnoses     Acute on chronic respiratory failure with hypercapnia              Acute exacerbation of chronic obstructive pulmonary disease (COPD)  Bilateral pneumonia, present on admission  Elevated troponin, Suspect type II MI  Hypertension  Right bundle-branch block  Obstructive sleep apnea with noncompliance to BiPAP  Acute on chronic hypoxic respiratory failure With hypercapnia  Left hip pain  Decreased oral intake  Sciatica    Plan:    Continue to try to wean down his FiO2.  Once the patient is on nasal cannula, and BiPAP at night, he can be sent to rehabilitation.  Check lab work in the a.m.  Further recommendations will depend on the clinical course.    Joshua Mendez,   01/07/18  3:04 PM

## 2018-01-07 NOTE — PLAN OF CARE
Problem: Patient Care Overview (Adult)  Goal: Plan of Care Review  Outcome: Ongoing (interventions implemented as appropriate)   01/07/18 1551   Coping/Psychosocial Response Interventions   Plan Of Care Reviewed With patient   Patient Care Overview   Progress improving   Outcome Evaluation   Outcome Summary/Follow up Plan Pt requested to hold amb today d/t fatigue however was agreeable to perform ex . Con't with PT POC       Problem: Inpatient Physical Therapy  Goal: Bed Mobility Goal LTG- PT  Outcome: Ongoing (interventions implemented as appropriate)   12/28/17 1711 01/07/18 1551   Bed Mobility PT LTG   Bed Mobility PT LTG, Date Established 12/28/17 --    Bed Mobility PT LTG, Time to Achieve 2 wks --    Bed Mobility PT LTG, Activity Type all bed mobility --    Bed Mobility PT LTG, [place] Level minimum assist (75% patient effort) --    Bed Mobility PT LTG, Outcome --  goal partially met     Goal: Transfer Training Goal 1 LTG- PT  Outcome: Ongoing (interventions implemented as appropriate)   12/28/17 1711 01/07/18 1551   Transfer Training PT LTG   Transfer Training PT LTG, Date Established 12/28/17 --    Transfer Training PT LTG, Time to Achieve 2 wks --    Transfer Training PT LTG, Activity Type sit to stand/stand to sit;bed to chair /chair to bed --    Transfer Training PT LTG, [place] Level minimum assist (75% patient effort) --    Transfer Training PT LTG, Assist Device walker, rolling --    Transfer Training PT LTG, Outcome --  goal ongoing     Goal: Gait Training Goal LTG- PT  Outcome: Ongoing (interventions implemented as appropriate)   12/28/17 1711 01/07/18 1551   Gait Training PT LTG   Gait Training Goal PT LTG, Date Established 12/28/17 --    Gait Training Goal PT LTG, Time to Achieve 2 wks --    Gait Training Goal PT LTG, [place] Level minimum assist (75% patient effort) --    Gait Training Goal PT LTG, Assist Device walker, rolling --    Gait Training Goal PT LTG, Distance to Achieve 25  --    Gait Training Goal PT LTG, Outcome --  goal ongoing

## 2018-01-08 LAB
ALBUMIN SERPL-MCNC: 3 G/DL (ref 3.5–5)
ANION GAP SERPL CALCULATED.3IONS-SCNC: 8.8 MMOL/L
BASOPHILS # BLD AUTO: 0.01 10*3/MM3 (ref 0–0.2)
BASOPHILS NFR BLD AUTO: 0.1 % (ref 0–2.5)
BUN BLD-MCNC: 28 MG/DL (ref 7–20)
BUN/CREAT SERPL: 28 (ref 6.3–21.9)
CALCIUM SPEC-SCNC: 8.9 MG/DL (ref 8.4–10.2)
CHLORIDE SERPL-SCNC: 104 MMOL/L (ref 98–107)
CO2 SERPL-SCNC: 34 MMOL/L (ref 26–30)
CREAT BLD-MCNC: 1 MG/DL (ref 0.6–1.3)
DEPRECATED RDW RBC AUTO: 42.5 FL (ref 37–54)
EOSINOPHIL # BLD AUTO: 0.16 10*3/MM3 (ref 0–0.7)
EOSINOPHIL NFR BLD AUTO: 1.3 % (ref 0–7)
ERYTHROCYTE [DISTWIDTH] IN BLOOD BY AUTOMATED COUNT: 12.4 % (ref 11.5–14.5)
GFR SERPL CREATININE-BSD FRML MDRD: 75 ML/MIN/1.73
GLUCOSE BLD-MCNC: 84 MG/DL (ref 74–98)
HCT VFR BLD AUTO: 33.7 % (ref 42–52)
HGB BLD-MCNC: 10.7 G/DL (ref 14–18)
IMM GRANULOCYTES # BLD: 0.05 10*3/MM3 (ref 0–0.06)
IMM GRANULOCYTES NFR BLD: 0.4 % (ref 0–0.6)
LYMPHOCYTES # BLD AUTO: 0.94 10*3/MM3 (ref 0.6–3.4)
LYMPHOCYTES NFR BLD AUTO: 7.7 % (ref 10–50)
MCH RBC QN AUTO: 29.5 PG (ref 27–31)
MCHC RBC AUTO-ENTMCNC: 31.8 G/DL (ref 30–37)
MCV RBC AUTO: 92.8 FL (ref 80–94)
MONOCYTES # BLD AUTO: 1.29 10*3/MM3 (ref 0–0.9)
MONOCYTES NFR BLD AUTO: 10.6 % (ref 0–12)
NEUTROPHILS # BLD AUTO: 9.71 10*3/MM3 (ref 2–6.9)
NEUTROPHILS NFR BLD AUTO: 79.9 % (ref 37–80)
NRBC BLD MANUAL-RTO: 0 /100 WBC (ref 0–0)
PHOSPHATE SERPL-MCNC: 2.8 MG/DL (ref 2.5–4.5)
PLATELET # BLD AUTO: 205 10*3/MM3 (ref 130–400)
PMV BLD AUTO: 9.3 FL (ref 6–12)
POTASSIUM BLD-SCNC: 3.8 MMOL/L (ref 3.5–5.1)
RBC # BLD AUTO: 3.63 10*6/MM3 (ref 4.7–6.1)
SODIUM BLD-SCNC: 143 MMOL/L (ref 137–145)
WBC NRBC COR # BLD: 12.16 10*3/MM3 (ref 4.8–10.8)

## 2018-01-08 PROCEDURE — 94799 UNLISTED PULMONARY SVC/PX: CPT

## 2018-01-08 PROCEDURE — 80069 RENAL FUNCTION PANEL: CPT | Performed by: INTERNAL MEDICINE

## 2018-01-08 PROCEDURE — 25010000002 METHYLPREDNISOLONE PER 40 MG: Performed by: INTERNAL MEDICINE

## 2018-01-08 PROCEDURE — 94660 CPAP INITIATION&MGMT: CPT

## 2018-01-08 PROCEDURE — 25010000002 LORAZEPAM PER 2 MG: Performed by: INTERNAL MEDICINE

## 2018-01-08 PROCEDURE — 25010000002 ENOXAPARIN PER 10 MG: Performed by: INTERNAL MEDICINE

## 2018-01-08 PROCEDURE — 85025 COMPLETE CBC W/AUTO DIFF WBC: CPT | Performed by: INTERNAL MEDICINE

## 2018-01-08 PROCEDURE — 97110 THERAPEUTIC EXERCISES: CPT

## 2018-01-08 PROCEDURE — 97116 GAIT TRAINING THERAPY: CPT

## 2018-01-08 PROCEDURE — 99232 SBSQ HOSP IP/OBS MODERATE 35: CPT | Performed by: INTERNAL MEDICINE

## 2018-01-08 PROCEDURE — 97168 OT RE-EVAL EST PLAN CARE: CPT

## 2018-01-08 RX ADMIN — IPRATROPIUM BROMIDE AND ALBUTEROL SULFATE 3 ML: .5; 3 SOLUTION RESPIRATORY (INHALATION) at 13:17

## 2018-01-08 RX ADMIN — IPRATROPIUM BROMIDE AND ALBUTEROL SULFATE 3 ML: .5; 3 SOLUTION RESPIRATORY (INHALATION) at 19:02

## 2018-01-08 RX ADMIN — BUDESONIDE 0.5 MG: 0.5 INHALANT RESPIRATORY (INHALATION) at 13:17

## 2018-01-08 RX ADMIN — Medication 1 EACH: at 09:19

## 2018-01-08 RX ADMIN — DULOXETINE HYDROCHLORIDE 30 MG: 30 CAPSULE, DELAYED RELEASE ORAL at 09:55

## 2018-01-08 RX ADMIN — LOSARTAN POTASSIUM 50 MG: 50 TABLET, FILM COATED ORAL at 09:56

## 2018-01-08 RX ADMIN — CLOPIDOGREL BISULFATE 75 MG: 75 TABLET ORAL at 09:55

## 2018-01-08 RX ADMIN — BUDESONIDE 0.5 MG: 0.5 INHALANT RESPIRATORY (INHALATION) at 19:02

## 2018-01-08 RX ADMIN — AMLODIPINE BESYLATE 5 MG: 5 TABLET ORAL at 09:56

## 2018-01-08 RX ADMIN — FINASTERIDE 5 MG: 5 TABLET, FILM COATED ORAL at 09:55

## 2018-01-08 RX ADMIN — IPRATROPIUM BROMIDE AND ALBUTEROL SULFATE 3 ML: .5; 3 SOLUTION RESPIRATORY (INHALATION) at 07:45

## 2018-01-08 RX ADMIN — LORAZEPAM 0.5 MG: 2 INJECTION INTRAMUSCULAR; INTRAVENOUS at 16:13

## 2018-01-08 RX ADMIN — CARVEDILOL 3.12 MG: 3.12 TABLET, FILM COATED ORAL at 20:07

## 2018-01-08 RX ADMIN — GUAIFENESIN AND DEXTROMETHORPHAN HYDROBROMIDE 2 TABLET: 600; 30 TABLET, EXTENDED RELEASE ORAL at 20:08

## 2018-01-08 RX ADMIN — PANTOPRAZOLE SODIUM 40 MG: 40 TABLET, DELAYED RELEASE ORAL at 06:15

## 2018-01-08 RX ADMIN — ENOXAPARIN SODIUM 40 MG: 40 INJECTION SUBCUTANEOUS at 20:05

## 2018-01-08 RX ADMIN — ATORVASTATIN CALCIUM 40 MG: 40 TABLET, FILM COATED ORAL at 09:56

## 2018-01-08 RX ADMIN — BISACODYL 10 MG: 10 SUPPOSITORY RECTAL at 09:57

## 2018-01-08 RX ADMIN — FLUTICASONE PROPIONATE 1 SPRAY: 50 SPRAY, METERED NASAL at 09:16

## 2018-01-08 RX ADMIN — MIRTAZAPINE 15 MG: 15 TABLET, FILM COATED ORAL at 20:07

## 2018-01-08 RX ADMIN — METAXALONE 400 MG: 800 TABLET ORAL at 09:55

## 2018-01-08 RX ADMIN — POLYETHYLENE GLYCOL 3350 17 G: 17 POWDER, FOR SOLUTION ORAL at 09:54

## 2018-01-08 RX ADMIN — Medication 1 EACH: at 22:07

## 2018-01-08 RX ADMIN — TRAZODONE HYDROCHLORIDE 50 MG: 50 TABLET ORAL at 20:07

## 2018-01-08 RX ADMIN — METHYLPREDNISOLONE SODIUM SUCCINATE 40 MG: 40 INJECTION, POWDER, FOR SOLUTION INTRAMUSCULAR; INTRAVENOUS at 06:15

## 2018-01-08 RX ADMIN — METAXALONE 400 MG: 800 TABLET ORAL at 16:13

## 2018-01-08 RX ADMIN — ALBUTEROL SULFATE 2.5 MG: 2.5 SOLUTION RESPIRATORY (INHALATION) at 11:12

## 2018-01-08 RX ADMIN — MEGESTROL ACETATE 800 MG: 40 SUSPENSION ORAL at 09:54

## 2018-01-08 RX ADMIN — Medication 10 ML: at 06:15

## 2018-01-08 RX ADMIN — OXYBUTYNIN CHLORIDE 5 MG: 5 TABLET ORAL at 06:15

## 2018-01-08 RX ADMIN — GUAIFENESIN AND DEXTROMETHORPHAN HYDROBROMIDE 2 TABLET: 600; 30 TABLET, EXTENDED RELEASE ORAL at 09:56

## 2018-01-08 RX ADMIN — METAXALONE 400 MG: 800 TABLET ORAL at 20:08

## 2018-01-08 RX ADMIN — TAMSULOSIN HYDROCHLORIDE 0.4 MG: 0.4 CAPSULE ORAL at 20:07

## 2018-01-08 RX ADMIN — CARVEDILOL 3.12 MG: 3.12 TABLET, FILM COATED ORAL at 09:56

## 2018-01-08 RX ADMIN — PANTOPRAZOLE SODIUM 40 MG: 40 TABLET, DELAYED RELEASE ORAL at 16:13

## 2018-01-08 RX ADMIN — DOCUSATE SODIUM AND SENNOSIDES 2 TABLET: 8.6; 5 TABLET, FILM COATED ORAL at 20:07

## 2018-01-08 RX ADMIN — NYSTATIN 500000 UNITS: 100000 SUSPENSION ORAL at 22:11

## 2018-01-08 NOTE — THERAPY RE-EVALUATION
Acute Care - Occupational Therapy Re-Evaluation   [facility]     Patient Name: Thurman Mendel Parsons  : 1950  MRN: [id number]  Today's Date: 2018  Onset of Illness/Injury or Date of Surgery Date: 17  Date of Referral to OT: 17  Referring Physician: Dr. Huber    Admit Date: 2017       ICD-10-CM ICD-9-CM   1. Acute exacerbation of chronic obstructive pulmonary disease (COPD) J44.1 491.21   2. Acute on chronic respiratory failure with hypercapnia J96.22 518.84   3. Impaired mobility and ADLs Z74.09 799.89   4. Impaired functional mobility, balance, gait, and endurance Z74.09 V49.89     Patient Active Problem List   Diagnosis   • Anxiety   • Atherosclerotic heart disease of native coronary artery without angina pectoris   • Atrial fibrillation   • Chronic kidney disease, stage III (moderate)   • Steroid-dependent COPD   • Degeneration of cervical intervertebral disc   • Diabetes mellitus   • GERD without esophagitis   • Diverticulosis   • Hemorrhoids   • Hiatal hernia   • Hyperlipidemia   • Hypertension   • Kyphosis, acquired   • Mitral and aortic valve disease   • Phimosis   • Sleep apnea   • Enlarged prostate without lower urinary tract symptoms (luts)   • History of arthritis   • Back pain   • Depression   • Stroke syndrome   • History of ventricular septal defect   • Neck pain   • Impaired mobility and ADLs   • Physical deconditioning   • Acute exacerbation of chronic obstructive pulmonary disease (COPD)     Past Medical History:   Diagnosis Date   • Abnormal liver enzymes    • Acute bronchitis    • Anemia    • Anxiety    • Arthritis    • Atherosclerotic heart disease of native coronary artery without angina pectoris    • Atrial fibrillation    • Atypical chest pain    • Back pain    • BPH (benign prostatic hyperplasia)    • Cardiac pain    • Chronic bronchitis    • Chronic kidney disease    • COPD (chronic obstructive pulmonary disease)    • Degeneration of cervical intervertebral  disc    • Depression    • Diabetes mellitus    • Diverticulosis    • Dyspnea    • Esophageal reflux    • Esophagitis    • Gastritis    • Hematuria    • Hemorrhoids    • Hiatal hernia    • History of arthritis    • History of myocardial infarction    • History of peripheral neuropathy    • History of ventricular septal defect    • History of ventricular septal defect    • Hyperlipidemia    • Hypertension    • Infectious diarrhea    • Infectious diarrhea    • Kyphosis, acquired    • Lumbar radiculopathy    • Mitral and aortic valve disease    • Myocardial infarction    • Nephrolithiasis    • Phimosis    • Respiratory failure    • Sleep apnea    • Stroke syndrome    • Stroke syndrome    • Urinary calculus    • Urinary tract infection    • Ventral hernia      Past Surgical History:   Procedure Laterality Date   • HERNIA REPAIR     • SUPRAPUBIC CATHETER INSERTION      History of Percutaneous Catheter Placement Into Ureter   • THROAT SURGERY     • VSD REPAIR      History of VSD Repair By Patch Closure          OT ASSESSMENT FLOWSHEET (last 72 hours)      OT Evaluation       01/08/18 1623 01/08/18 1200 01/08/18 0800 01/07/18 2100 01/07/18 1551    Rehab Evaluation    Document Type re-evaluation  -SD    therapy note (daily note)  -CC    Subjective Information agree to therapy;complains of;pain  -SD    agree to therapy  -CC    Patient Effort, Rehab Treatment good  -SD    good  -CC    Symptoms Noted During/After Treatment fatigue  -SD    shortness of breath  -CC    Symptoms Noted Comment     O2 SATS >96% during therapy  -CC    General Information    Precautions/Limitations     fall precautions;oxygen therapy device and L/min  -CC    Equipment Currently Used at Home    rollator;oxygen;commode;shower chair  -DW     Living Environment    Transportation Available    family or friend will provide;car  -DW     Vital Signs    Pre SpO2 (%) 94  -SD        O2 Delivery Pre Treatment supplemental O2   high flow  -SD        Intra SpO2 (%)  90  -SD        O2 Delivery Intra Treatment supplemental O2   high flow  -SD        Post SpO2 (%) 92  -SD        O2 Delivery Post Treatment supplemental O2   high flow  -SD        Pre Patient Position Supine  -SD        Intra Patient Position Standing  -SD        Post Patient Position Supine  -SD        Pain Assessment    Pain Assessment 0-10  -SD    0-10  -CC    Pain Score 6  -SD    6  -CC    Post Pain Score 6  -SD    6  -CC    Pain Type Acute pain  -SD    Acute pain  -CC    Pain Location Hip  -SD    Hip  -CC    Pain Orientation Left  -SD    Left  -CC    Pain Intervention(s) Repositioned;Ambulation/increased activity  -SD    Repositioned  -CC    Response to Interventions tolerated  -SD        Vision Assessment/Intervention    Visual Impairment     WFL with corrective lenses  -CC    Muscle Tone Assessment    Muscle Tone Assessment  Bilateral Upper Extremities  -MM Bilateral Upper Extremities  -MM      Bilateral Upper Extremities Muscle Tone Assessment  mildly decreased tone  -MM mildly decreased tone  -MM      Bilateral Lower Extremities Muscle Tone Assessment  mildly decreased tone  -MM mildly decreased tone  -MM      Bed Mobility, Assessment/Treatment    Bed Mobility, Assistive Device bed rails;head of bed elevated  -SD        Bed Mob, Supine to Sit, [place] supervision required  -SD        Bed Mob, Sit to Supine, [place] supervision required  -SD        Bed Mobility, Impairments strength decreased  -SD        Transfer Assessment/Treatment    Transfers, Sit-Stand [place] contact guard assist  -SD        Transfers, Stand-Sit [place] contact guard assist  -SD        Transfers, Sit-Stand-Sit, Assist Device rolling walker  -SD        Toilet Transfer, [place] contact guard assist  -SD        Toilet Transfer, Assistive Device rolling walker;bedside commode without drop arms  -SD        Transfer, Safety Issues balance decreased during turns  -SD        Transfer, Impairments strength  decreased;impaired balance  -SD        Functional Mobility    Functional Mobility- [place] Level contact guard assist  -SD        Functional Mobility- Device rolling walker  -SD        Functional Mobility-Distance (Feet) 9  -SD        Functional Mobility- Safety Issues balance decreased during turns;supplemental O2  -SD        Upper Body Bathing Assessment/Training    UB Bathing Assess/Train, [place] Level contact guard assist  -SD        Lower Body Bathing Assessment/Training    LB Bathing Assess/Train, [place] Level minimum assist (75% patient effort)  -SD        Upper Body Dressing Assessment/Training    UB Dressing Assess/Train, [place] contact guard assist  -SD        Lower Body Dressing Assessment/Training    LB Dressing Assess/Train, [place] minimum assist (75% patient effort)  -SD        Toileting Assessment/Training    Toileting Assess/Train, [place] Level contact guard assist  -SD        Grooming Assessment/Training    Grooming Assess/Train, [place] Level set up required  -SD        Therapy Exercises    Bilateral Lower Extremities     AROM:;15 reps;supine;ankle pumps/circles;glut sets;heel slides;hip abduction/adduction;quad sets;SAQ  -CC    Positioning and Restraints    Pre-Treatment Position in bed  -SD    in bed  -CC    Post Treatment Position bed  -SD    bed  -CC    In Bed supine;call light within reach;encouraged to call for assist;with family/caregiver  -SD    fowlers;encouraged to call for assist  -CC      01/06/18 0133 01/05/18 2030             Rehab Evaluation    Document Type therapy note (daily note)  -CK        Subjective Information agree to therapy  -CK        Patient Effort, Rehab Treatment good  -CK        Symptoms Noted During/After Treatment shortness of breath  -CK        General Information    Precautions/Limitations fall precautions;oxygen therapy device and L/min  -CK        Equipment Currently Used at Home  rollator;oxygen;commode;shower chair  -DW       Living  Environment    Transportation Available  car;family or friend will provide  -DW       Vital Signs    O2 Delivery Pre Treatment supplemental O2  -CK        O2 Delivery Intra Treatment supplemental O2  -CK        O2 Delivery Post Treatment supplemental O2  -CK        Pain Assessment    Pain Assessment 0-10  -CK        Pain Score 6  -CK        Post Pain Score 6  -CK        Pain Type Acute pain  -CK        Pain Location Hip  -CK        Pain Orientation Left  -CK        Pain Intervention(s) Repositioned  -CK        Response to Interventions --   tolerated  -CK        Bed Mobility, Assessment/Treatment    Bed Mobility, Assistive Device bed rails;head of bed elevated  -CK        Bed Mob, Supine to Sit, [place] supervision required  -CK        Transfer Assessment/Treatment    Transfers, Bed-Chair [place] minimum assist (75% patient effort);contact guard assist  -CK        Transfers, Sit-Stand [place] contact guard assist  -CK        Transfers, Stand-Sit [place] contact guard assist  -CK        Transfers, Sit-Stand-Sit, Assist Device rolling walker  -CK        Therapy Exercises    Bilateral Lower Extremities 15 reps;AROM:;sitting;ankle pumps/circles;hip abduction/adduction;hip flexion;LAQ  -CK        Positioning and Restraints    Pre-Treatment Position in bed  -CK        Post Treatment Position chair  -CK        In Bed sitting;encouraged to call for assist  -CK          User Key  (r) = Recorded By, (t) = Taken By, (c) = Cosigned By    Initials Name Effective Dates    MM Lolita Aguilera, RN 10/26/16 -     DW Renetta Sheikh, RN 10/26/16 -     CC Rebeca Sawyer, PTA 10/26/16 -     CK Jessie Davenport, PTA 10/26/16 -     TONY Edwards OT 06/30/17 -            Occupational Therapy Education     Title: PT OT SLP Therapies (Active)     Topic: Occupational Therapy (Active)     Point: ADL training (Done)    Description: Instruct learner(s) on proper safety adaptation and remediation techniques during self  care or transfers.   Instruct in proper use of assistive devices.    Learning Progress Summary    Learner Readiness Method Response Comment Documented by Status   Patient Acceptance E VU Benefit of therapy and continued need for OT services. SD 01/08/18 1713 Done    Acceptance E VU Energy conservation techniques during bathing tasks to prevent dyspnea utilizing PLB techniques. SD 01/05/18 1711 Done    Acceptance E VU Safety/sequencing during functional transfers SD 01/04/18 1553 Done    Acceptance E VU PLB exercises during toileting task utilizing urinal in standing EOB to prevent dyspnea. SD 01/03/18 1319 Done    Acceptance E VU benefit of working with therapy to regain strength  01/02/18 1518 Done    Acceptance E NR   01/02/18 0334 Active    Acceptance E VU Role of OT/POC  12/28/17 1401 Done   Family Acceptance E VU  AE 01/01/18 0103 Done    Acceptance E VU Role of OT/POC  12/28/17 1401 Done               Point: Home exercise program (Done)    Description: Instruct learner(s) on appropriate technique for monitoring, assisting and/or progressing therapeutic exercises/activities.    Learning Progress Summary    Learner Readiness Method Response Comment Documented by Status   Patient Acceptance E VU benefit of working with therapy to regain strength  01/02/18 1518 Done                      User Key     Initials Effective Dates Name Provider Type Discipline     10/26/16 -  Akilah Gutierrez Occupational Therapist OT    SD 06/30/17 -  Kelle Edwards OT Occupational Therapist OT     08/31/17 -  Mayda Nguyen, RN Registered Nurse Nurse     01/20/17 -  Maryjane Subramanian RN Registered Nurse Nurse                  OT Recommendation and Plan  Anticipated Discharge Disposition: inpatient rehabilitation facility  Planned Therapy Interventions: ADL retraining, bed mobility training, strengthening, transfer training  Therapy Frequency: 3-5 times/wk  Plan of Care Review  Plan Of Care Reviewed With:  patient  Progress: improving  Outcome Summary/Follow up Plan: OT re-eval completed this date. Patient has progressed to CGA with functional transfers and functional mobility,  9' at bedside d/t requiring high flow O2. Patient able to walk forward and backward as well as sidestep, maintaining good O2 saturation. Patient continues to require Min A for LBD and LBB tasks d/t left hip pain. Patient is expected to continue to benefit from OT services to improve functional performance and mobility prior to DC.           OT Goals       01/08/18 1707 01/05/18 1707 01/04/18 1550    Bed Mobility OT LTG    Bed Mobility OT LTG, Date Established 01/08/18  -SD      Bed Mobility OT LTG, Time to Achieve 2 wks  -SD      Bed Mobility OT LTG, Activity Type all bed mobility  -SD      Bed Mobility OT LTG, [place] Level conditional independence  -SD      Bed Mobility OT LTG, Outcome goal ongoing  -SD      Bed Mobility OT LTG, Reason Goal Not Met goals revised this date  -SD      Transfer Training OT LTG    Transfer Training OT LTG, Date Established 01/08/18  -SD      Transfer Training OT LTG, Time to Achieve 2 wks  -SD      Transfer Training OT LTG, Activity Type sit to stand/stand to sit;toilet  -SD      Transfer Training OT LTG, [place] Level supervision required  -SD      Transfer Training OT LTG, Assist Device walker, rolling  -SD      Transfer Training OT LTG, Date Goal Reviewed   01/04/18  -SD    Transfer Training OT LTG, Outcome goal ongoing  -SD  goal met  -SD    Transfer Training OT LTG, Reason Goal Not Met goals revised this date  -SD      Strength OT LTG    Strength Goal OT LTG, Date Established 01/08/18  -SD      Strength Goal OT LTG, Time to Achieve 2 wks  -SD      Strength Goal OT LTG, Measure to Achieve Patient will perform 25 reps UB graded ther ex maintaining O2 >90 in order to increase strength and endurance.   -SD      Strength Goal OT LTG, Date Goal Reviewed  01/05/18  -SD 01/04/18  -SD    Strength Goal OT  LTG, Outcome goal ongoing  -SD goal ongoing  -SD goal ongoing  -SD    Strength Goal OT LTG, Reason Goal Not Met goals revised this date  -SD      LB Dressing OT LTG    LB Dressing Goal OT LTG, Date Established 01/08/18  -SD      LB Dressing Goal OT LTG, Time to Achieve 2 wks  -SD      LB Dressing Goal OT LTG, [place] Level contact guard assist  -SD      LB Dressing Goal OT LTG, Date Goal Reviewed  01/05/18  -SD 01/04/18  -SD    LB Dressing Goal OT LTG, Outcome goal ongoing  -SD goal ongoing  -SD goal ongoing  -SD    LB Dressing Goal OT LTG, Reason Goal Not Met goals revised this date  -SD      UB Dressing OT LTG    UB Dressing Goal OT LTG, Date Established 01/08/18  -SD      UB Dressing Goal OT LTG, Time to Achieve 2 wks  -SD      UB Dressing Goal OT LTG, [place] Level set up required  -SD      UB Dressing Goal OT LTG, Date Goal Reviewed  01/05/18  -SD 01/04/18  -SD    UB Dressing Goal OT LTG, Outcome goal ongoing  -SD goal ongoing  -SD goal ongoing  -SD      01/03/18 1315 01/02/18 1519 12/28/17 1404    Bed Mobility OT LTG    Bed Mobility OT LTG, Date Established   12/28/17  -    Bed Mobility OT LTG, Time to Achieve   by discharge  -    Bed Mobility OT LTG, Activity Type   supine to sit/sit to supine  -    Bed Mobility OT LTG, [place] Level   minimum assist (75% patient effort)  -    Bed Mobility OT LTG, Date Goal Reviewed 01/03/18  -SD 01/02/18  -     Bed Mobility OT LTG, Outcome goal met  -SD goal ongoing  -AH goal ongoing  -    Transfer Training OT LTG    Transfer Training OT LTG, Date Established   12/28/17  -    Transfer Training OT LTG, Time to Achieve   by discharge  -    Transfer Training OT LTG, Activity Type   sit to stand/stand to sit;bed to chair /chair to bed  -    Transfer Training OT LTG, [place] Level   minimum assist (75% patient effort)  -    Transfer Training OT LTG, Date Goal Reviewed 01/03/18  -SD 01/02/18  -     Transfer Training OT LTG, Outcome goal  ongoing  -SD goal ongoing  -AH goal ongoing  -AH    Strength OT LTG    Strength Goal OT LTG, Date Established   12/28/17  -AH    Strength Goal OT LTG, Time to Achieve   by discharge  -    Strength Goal OT LTG, Functional Goal   Pt will perform 15 reps UE ex progressing from AROM to strengthening as pt tolerates  -AH    Strength Goal OT LTG, Date Goal Reviewed 01/03/18  -SD 01/02/18  -AH     Strength Goal OT LTG, Outcome goal ongoing  -SD goal ongoing  -AH goal ongoing  -AH    LB Dressing OT LTG    LB Dressing Goal OT LTG, Date Established   12/28/17  -AH    LB Dressing Goal OT LTG, Time to Achieve   by discharge  -    LB Dressing Goal OT LTG, [place] Level   moderate assist (50% patient effort)  -    LB Dressing Goal OT LTG, Date Goal Reviewed 01/03/18  -SD 01/02/18  -AH     LB Dressing Goal OT LTG, Outcome goal ongoing  -SD goal ongoing  -AH goal ongoing  -AH    UB Dressing OT LTG    UB Dressing Goal OT LTG, Date Established   12/28/17  -    UB Dressing Goal OT LTG, Time to Achieve   by discharge  -    UB Dressing Goal OT LTG, [place] Level   minimum assist (75% patient effort)  -    UB Dressing Goal OT LTG, Date Goal Reviewed 01/03/18  -SD 01/02/18  -     UB Dressing Goal OT LTG, Outcome goal ongoing  -SD goal ongoing  -AH goal ongoing  -AH      User Key  (r) = Recorded By, (t) = Taken By, (c) = Cosigned By    Initials Name Provider Type    SOWMYA Gutierrez Occupational Therapist    TONY Edwards OT Occupational Therapist                Outcome Measures       01/08/18 1623 01/06/18 0133       How much help from another person do you currently need...    Turning from your back to your side while in flat bed without using bedrails?  3  -CK     Moving from lying on back to sitting on the side of a flat bed without bedrails?  3  -CK     Moving to and from a bed to a chair (including a wheelchair)?  3  -CK     Standing up from a chair using your arms (e.g., wheelchair, bedside chair)?  3   -CK     Climbing 3-5 steps with a railing?  1  -CK     To walk in hospital room?  2  -CK     AM-PAC 6 Clicks Score  15  -CK     How much help from another is currently needed...    Putting on and taking off regular lower body clothing? 3  -SD      Bathing (including washing, rinsing, and drying) 3  -SD      Toileting (which includes using toilet bed pan or urinal) 3  -SD      Putting on and taking off regular upper body clothing 3  -SD      Taking care of personal grooming (such as brushing teeth) 3  -SD      Eating meals 4  -SD      Score 19  -SD      Functional Assessment    Outcome Measure Options AM-PAC 6 Clicks Daily Activity (OT)  -SD AM-PAC 6 Clicks Basic Mobility (PT)  -CK       User Key  (r) = Recorded By, (t) = Taken By, (c) = Cosigned By    Initials Name Provider Type    CARMELO Davenport, PTA Physical Therapy Assistant    TONY Edwards OT Occupational Therapist          Time Calculation:   OT Start Time: 1623    Therapy Charges for Today     Code Description Service Date Service Provider Modifiers Qty    [id number]  OT RE-EVAL 2 1/8/2018 Kelle Edwards OT GO 1               Kelle Edwards OT  1/8/2018

## 2018-01-08 NOTE — PLAN OF CARE
Problem: Patient Care Overview (Adult)  Goal: Plan of Care Review  Outcome: Ongoing (interventions implemented as appropriate)   01/08/18 1410   Coping/Psychosocial Response Interventions   Plan Of Care Reviewed With patient   Patient Care Overview   Progress improving       Problem: NPPV/CPAP (Adult)  Goal: Signs and Symptoms of Listed Potential Problems Will be Absent or Manageable (NPPV/CPAP)  Outcome: Ongoing (interventions implemented as appropriate)   01/08/18 1410   NPPV/CPAP   Problems Assessed (NPPV/CPAP) all      01/08/18 1410   NPPV/CPAP   Problems Assessed (NPPV/CPAP) all   Problems Present (NPPV/CPAP) none

## 2018-01-08 NOTE — DISCHARGE PLACEMENT REQUEST
"Parsons, Thurman Mendel (67 y.o. Male)     Date of Birth Social Security Number Address Home Phone MRN    1950  [id number] [facility] [id number] [phone] [id number]    Hoahaoism Marital Status          None        Admission Date Admission Type Admitting Provider Attending Provider Department, Room/Bed    17 Emergency Janice Garcia MD Hinsberg, Francis J, DO [facility] MED SURG  3, 328/    Discharge Date Discharge Disposition Discharge Destination                      Attending Provider: Joshua Mendez DO     Allergies:  Milk-related Compounds, Mucomyst [Acetylcysteine], Sulfa Antibiotics    Isolation:  None   Infection:  None   Code Status:  Conditional    Ht:  170.2 cm (67\")   Wt:  66.7 kg (147 lb 1.6 oz)    Admission Cmt:  None   Principal Problem:  None                Active Insurance as of 2017     Primary Coverage     Payor Plan Insurance Group Employer/Plan Group    [facility] MEDICARE REPLACEMENT [facility] [id number]     Payor Plan Address Payor Plan Phone Number Effective From Effective To    [address] [address]  [address]     [place] [address]       Subscriber Name Subscriber Birth Date Member ID       PARSONS,THURMAN MENDEL [id number]                 Emergency Contacts      (Rel.) Home Phone Work Phone Mobile Phone    Edward Easton -- -- [phone]    Jean Marie Pena -- -- [phone]    April Easton -- -- [phone]    Minda Pena (Mother) [phone] -- --               History & Physical      Janice Garcia MD at 2017  8:39 PM              [facility]     Hospitalist History and Physical      Name:  Thurman Mendel Parsons   Age:  67 y.o.  Sex:  male  :  1950  MRN:  [id number]   Visit Number:  [id number]  Admission Date:  2017  Date Of Service:  17  Primary Care Physician:  Miguel Rojas MD    History Obtained From:    patient, family and nursing staff   "   Chief Complaint    Shortness of breath    History of Present Illness    Mr. Pena is a 67 years old gentleman with history of chronic respiratory failure secondary to COPD, obstructive sleep apnea requiring BiPAP and hypertension who was transported to the emergency room via EMS due to progressive worsening shortness of breath.    Per patient, his symptoms started 3 days ago with shortness of breath, cough, expectoration of yellowish sputum, decreased appetite, nausea and increasing weakness.  He denies associated fever, chills, chest pain, abdominal pain or symptoms to suggest new focal neurological deficits.  As his sister went to pick him up for a [place] gathering, he was in respiratory distress, lethargic and confused; therefore she contacted 911 and patient was transferred to the emergency room by ambulance for further evaluation and management.  Of note, patient was hospitalized at this facility in September 2017 for similar symptoms attributed to right lower lobe pneumonia and COPD exacerbation and acute on chronic hypoxic hypercapnic respiratory failure.  Since then he remained in stable condition, however he admits to not being adherent with CPAP use.  3 weeks ago he was diagnosed with upper respiratory infection for which received by mouth (? Erythromycin).    Upon arrival to the emergency room, initial vital signs included temperature 99.1°F, heart rate 112/m, respiratory 28/m, blood pressure 191/77, O2 sats 94% on nasal cannula; initial ABG was significant for the 7.23, PCO2 81, PO2 173 on 80% nonrebreather.  BiPAP was applied after which a repeat ABG revealed pH 7.33, PCO2 63 and PO2 113.  Workup revealed troponin 0.079, cardiac BNP 1230, glucose 102, BUN 22, creatinine 1.2 [baseline 1.1], otherwise CMP and CBC were unremarkable.  EKG revealed right bundle branch block which was noted on priors.  Chest x-ray was negative for acute changes.  IV Solu-Medrol, Rocephin and azithromycin were  administered as well as bronchodilator nebulizers.  Patient gradually improved symptomatically and remained hemodynamically stable with O2 sats in the 90s.  Accordingly, he was transferred to [facility] floor with telemetry on the hospitalist service for further evaluation.    Review Of Systems:     General ROS: positive for  - fatigue  Psychological ROS: positive for - anxiety  Ophthalmic ROS: negative  ENT ROS: negative  Allergy and Immunology ROS: negative  Hematological and Lymphatic ROS: negative  Endocrine ROS: negative  Breast ROS: negative for breast lumps  Respiratory ROS: positive for - cough, shortness of breath, yellowish sputum, wheezing   Cardiovascular ROS: negative  Gastrointestinal ROS: positive for - appetite loss  Genito-Urinary ROS: no dysuria, trouble voiding, or hematuria  Musculoskeletal ROS: positive for - myalgias  Neurological ROS: no TIA or stroke symptoms  Dermatological ROS: negative     Past Medical History:    Past Medical History:   Diagnosis Date   • Abnormal liver enzymes    • Acute bronchitis    • Anemia    • Anxiety    • Arthritis    • Atherosclerotic heart disease of native coronary artery without angina pectoris    • Atrial fibrillation    • Atypical chest pain    • Back pain    • BPH (benign prostatic hyperplasia)    • Cardiac pain    • Chronic bronchitis    • Chronic kidney disease    • COPD (chronic obstructive pulmonary disease)    • Degeneration of cervical intervertebral disc    • Depression    • Diabetes mellitus    • Diverticulosis    • Dyspnea    • Esophageal reflux    • Esophagitis    • Gastritis    • Hematuria    • Hemorrhoids    • Hiatal hernia    • History of arthritis    • History of myocardial infarction    • History of peripheral neuropathy    • History of ventricular septal defect    • History of ventricular septal defect    • Hyperlipidemia    • Hypertension    • Infectious diarrhea    • Infectious diarrhea    • Kyphosis, acquired    • Lumbar radiculopathy    •  Mitral and aortic valve disease    • Myocardial infarction    • Nephrolithiasis    • Phimosis    • Respiratory failure    • Sleep apnea    • Stroke syndrome    • Stroke syndrome    • Urinary calculus    • Urinary tract infection    • Ventral hernia    · Chronic respiratory failure, on home O2 4L NC  · Obstructive sleep apnea hypopnea syndrome, on BIPAP    Past Surgical history:    Past Surgical History:   Procedure Laterality Date   • HERNIA REPAIR     • SUPRAPUBIC CATHETER INSERTION      History of Percutaneous Catheter Placement Into Ureter   • THROAT SURGERY     • VSD REPAIR      History of VSD Repair By Patch Closure     Social History:    Patient is a , wife  approximately one and a half year ago.  Lives alone, attends to his needs and continues to drive. Has a walker which he uses occasionally.  Smoked for 50 years, quit few months ago; denies alcohol or elicit drug abuse.      Family History:    Family History   Problem Relation Age of Onset   • Other Father      CABG   • Coronary artery disease Father      Allergies:    Milk-related compounds and Sulfa antibiotics  Home Medications:  Prior to Admission Medications     Prescriptions Last Dose Informant Patient Reported? Taking?    alfuzosin (UROXATRAL) 10 MG 24 hr tablet   No No    Take 1 tablet by mouth Daily.    ALPRAZolam (XANAX) 0.5 MG tablet   No No    Take 1 tablet by mouth At Night As Needed for Anxiety.    amLODIPine (NORVASC) 5 MG tablet   No No    TAKE ONE TABLET BY MOUTH DAILY    atorvastatin (LIPITOR) 40 MG tablet   No No    Take 1 tablet by mouth Daily.    baclofen (LIORESAL) 10 MG tablet   No No    Take 1 tablet by mouth 3 (Three) Times a Day.    benzonatate (TESSALON) 200 MG capsule   No No    Take 1 capsule by mouth 3 (Three) Times a Day As Needed for Cough.    BREO ELLIPTA 200-25 MCG/INH aerosol powder    Yes No    Inhale 1 puff Daily.    budesonide (PULMICORT) 0.5 MG/2ML nebulizer solution   No No    Take 2 mL by nebulization 2  (Two) Times a Day.    carvedilol (COREG) 3.125 MG tablet   No No    TAKE ONE TABLET BY MOUTH TWICE A DAY WITH MEALS    Patient taking differently:  TAKE ONE TABLET BY MOUTH TWICE A DAY WITH MEALS      6.25 bid    clopidogrel (PLAVIX) 75 MG tablet   No No    Take 1 tablet by mouth Daily.    cyclobenzaprine (FLEXERIL) 10 MG tablet   No No    Take 0.5 tablets by mouth At Night As Needed for Muscle Spasms.    escitalopram (LEXAPRO) 10 MG tablet   No No    Take 1 tablet by mouth Daily.    Patient taking differently:  Take 10 mg by mouth Every Evening.    finasteride (PROSCAR) 5 MG tablet   No No    Take 1 tablet by mouth Daily.    fluticasone (FLONASE) 50 MCG/ACT nasal spray   No No    1 spray into each nostril Daily.    HYDROcod Polst-CPM Polst ER (TUSSIONEX PENNKINETIC) 10-8 MG/5ML ER suspension   No No    Take 5 mL by mouth Every 12 (Twelve) Hours As Needed for Cough.    insulin aspart (novoLOG) 100 UNIT/ML injection   No No    Inject 0-7 Units under the skin 4 (Four) Times a Day Before Meals & at Bedtime.    ipratropium-albuterol (DUO-NEB) 0.5-2.5 mg/mL nebulizer   No No    Take 3 mL by nebulization Every 6 (Six) Hours.    losartan (COZAAR) 100 MG tablet   No No    Take 1 tablet by mouth daily.    Multiple Vitamin (MULTI-VITAMIN DAILY PO)   Yes No    Take  by mouth Daily.    nitroglycerin (NITROSTAT) 0.4 MG SL tablet   Yes No    Place  under the tongue.    oxybutynin (DITROPAN) 5 MG tablet   No No    TAKE ONE TABLET BY MOUTH AT BEDTIME    pantoprazole (PROTONIX) 20 MG EC tablet   No No    Take 1 tablet by mouth Every Morning Before Breakfast.    predniSONE (DELTASONE) 10 MG tablet   No No    4 tabs daily X 3 days then 2 tabs daily X 3 days then 1 tab daily X 3 days then stop.    predniSONE (DELTASONE) 10 MG tablet   Yes Yes    Take 10 mg by mouth Daily.    PROAIR  (90 BASE) MCG/ACT inhaler   Yes No    Inhale 2 puffs Every 4 (Four) Hours As Needed for Wheezing or Shortness of Air.    pseudoephedrine-guaifenesin  (MUCINEX D)  MG per 12 hr tablet   No No    Take 1 tablet by mouth Every 12 (Twelve) Hours.    spironolactone (ALDACTONE) 25 MG tablet   No No    Take 0.5 tablets by mouth Daily.    traMADol (ULTRAM) 50 MG tablet   No No    Take 1 tablet by mouth Every 6 (Six) Hours As Needed for Moderate Pain .           Hospital Scheduled Meds:    Current Facility-Administered Medications:   •  albuterol (PROVENTIL) nebulizer solution 0.083% 2.5 mg/3mL, 2.5 mg, Nebulization, Q6H PRN, Janice Garcia MD  •  ALPRAZolam (XANAX) tablet 0.5 mg, 0.5 mg, Oral, Nightly PRN, Janice Garcia MD  •  [START ON 12/25/2017] amLODIPine (NORVASC) tablet 5 mg, 5 mg, Oral, Q24H, Janice Garcia MD  •  [START ON 12/25/2017] atorvastatin (LIPITOR) tablet 40 mg, 40 mg, Oral, Daily, Janice Garcia MD  •  baclofen (LIORESAL) tablet 10 mg, 10 mg, Oral, TID, Janice Garcia MD  •  benzonatate (TESSALON) capsule 200 mg, 200 mg, Oral, TID PRN, Janice Garcia MD  •  budesonide (PULMICORT) nebulizer solution 0.5 mg, 0.5 mg, Nebulization, BID - RT, Janice Garcia MD  •  budesonide-formoterol (SYMBICORT) 80-4.5 MCG/ACT inhaler 2 puff, 2 puff, Inhalation, BID - RT, Janice Garcia MD  •  carvedilol (COREG) tablet 3.125 mg, 3.125 mg, Oral, Q12H, Janice Garcia MD  •  cefTRIAXone (ROCEPHIN) in SWFI 1 gram/10ml IV PUSH syringe, 1 g, Intravenous, Q24H, Janice Garcia MD  •  [START ON 12/25/2017] clopidogrel (PLAVIX) tablet 75 mg, 75 mg, Oral, Daily, Janice Garcia MD  •  cyclobenzaprine (FLEXERIL) tablet 5 mg, 5 mg, Oral, Nightly PRN, Janice Garcia MD  •  enoxaparin (LOVENOX) syringe 40 mg, 40 mg, Subcutaneous, Q24H, Janice Garcia MD  •  [START ON 12/25/2017] escitalopram (LEXAPRO) tablet 10 mg, 10 mg, Oral, Daily, Janice Garcia MD  •  [START ON 12/25/2017] finasteride (PROSCAR) tablet 5 mg, 5 mg, Oral, Daily, Janice Garcia MD  •  [START ON 12/25/2017] fluticasone (FLONASE) 50 MCG/ACT nasal spray 1 spray, 1 spray, Nasal, Daily,  Janice Garcia MD  •  guaiFENesin (MUCINEX) 12 hr tablet 1,200 mg, 1,200 mg, Oral, Q12H, Janice Garcia MD  •  insulin aspart (novoLOG) injection 0-7 Units, 0-7 Units, Subcutaneous, 4x Daily AC & at Bedtime, Janice Garcia MD  •  ipratropium-albuterol (DUO-NEB) nebulizer solution 3 mL, 3 mL, Nebulization, Q6H - RT, Janice Garcia MD  •  [START ON 12/25/2017] losartan (COZAAR) tablet 100 mg, 100 mg, Oral, Daily, Janice Garcia MD  •  methylPREDNISolone sodium succinate (SOLU-Medrol) injection 80 mg, 80 mg, Intravenous, Q8H, Janice Garcia MD  •  nitroglycerin (NITROSTAT) SL tablet 0.4 mg, 0.4 mg, Sublingual, Q5 Min PRN, Janice Garcia MD  •  [START ON 12/25/2017] oxybutynin (DITROPAN) tablet 5 mg, 5 mg, Oral, QAM, Janice Garcia MD  •  [START ON 12/25/2017] pantoprazole (PROTONIX) EC tablet 20 mg, 20 mg, Oral, QAM AC, Janice Garcia MD  •  sodium chloride 0.9 % flush 1-10 mL, 1-10 mL, Intravenous, PRN, Janice Garcia MD  •  sodium chloride 0.9 % flush 10 mL, 10 mL, Intravenous, PRN, Triage Protocol Emergency, MD  •  [START ON 12/25/2017] spironolactone (ALDACTONE) tablet 12.5 mg, 12.5 mg, Oral, Daily, Janice Garcia MD  •  tamsulosin (FLOMAX) 24 hr capsule 0.4 mg, 0.4 mg, Oral, Nightly, Janice Garcia MD  •  traMADol (ULTRAM) tablet 50 mg, 50 mg, Oral, Q6H PRN, Janice Garcia MD    [START ON 12/25/2017] amLODIPine 5 mg Oral Q24H   [START ON 12/25/2017] atorvastatin 40 mg Oral Daily   baclofen 10 mg Oral TID   budesonide 0.5 mg Nebulization BID - RT   budesonide-formoterol 2 puff Inhalation BID - RT   carvedilol 3.125 mg Oral Q12H   ceftriaxone 1 g Intravenous Q24H   [START ON 12/25/2017] clopidogrel 75 mg Oral Daily   enoxaparin 40 mg Subcutaneous Q24H   [START ON 12/25/2017] escitalopram 10 mg Oral Daily   [START ON 12/25/2017] finasteride 5 mg Oral Daily   [START ON 12/25/2017] fluticasone 1 spray Nasal Daily   guaiFENesin 1,200 mg Oral Q12H   insulin aspart 0-7 Units Subcutaneous 4x Daily  AC & at Bedtime   ipratropium-albuterol 3 mL Nebulization Q6H - RT   [START ON 12/25/2017] losartan 100 mg Oral Daily   methylPREDNISolone sodium succinate 80 mg Intravenous Q8H   [START ON 12/25/2017] oxybutynin 5 mg Oral QAM   [START ON 12/25/2017] pantoprazole 20 mg Oral QAM AC   [START ON 12/25/2017] spironolactone 12.5 mg Oral Daily   tamsulosin 0.4 mg Oral Nightly         Vital Signs:  Temp:  [97.1 °F (36.2 °C)-99.1 °F (37.3 °C)] 97.1 °F (36.2 °C)  Heart Rate:  [] 95  Resp:  [22-40] 22  BP: (103-191)/(62-87) 153/87  Last 3 weights    12/24/17  1529   Weight: 64.4 kg (142 lb)     Body mass index is 22.24 kg/(m^2).    Physical Exam:     General Appearance:  Alert and cooperative, not in any acute distress; BIPAP in place; quickly de-sats when removed.    Head:  Atraumatic and normocephalic, without obvious abnormality.   Eyes:          PERRLA, conjunctivae and sclerae normal, no Icterus. No pallor. Extra-occular movements are within normal limits.   Ears:  Ears appear intact with no abnormalities noted.   Throat: No oral lesions, no thrush, oral mucosa moist.   Neck: Supple, trachea midline, no thyromegaly, no carotid bruit.   Back:   No kyphoscoliosis. No tenderness to palpation.   Lungs:   Breath sounds are diminished in bilateral bases; bilateral rhonchi and expiratory wheezes noted   Heart:  Normal S1 and S2, no murmur, no gallop, no rub. No JVD.   Abdomen:   Normal bowel sounds, no masses, no organomegaly. Soft, non-tender, non-distended, no guarding    Extremities: Moves all extremities well, edema, cyanosis or clubbing.     Pulses: Pulses palpable and equal bilaterally.   Skin: No bleeding or rash.  Generalized dry skin noted; small healing skin tear noted on right 4th toe.   Neurologic: Alert and oriented x 3. Moves all four limbs equally. No tremors. No facial asymetry.     EKG:      Sinus tachycardia, 106/min, right bundle branch block noted on prior.      Telemetry:      Sinus rhythm    I have  personally looked at both the EKG and the telemetry strips.    Labs:    Results from last 7 days  Lab Units 12/24/17  1533   WBC 10*3/mm3 8.75   HEMOGLOBIN g/dL 12.4*   HEMATOCRIT % 40.7*   MCV fL 95.1*   MCHC g/dL 30.5   PLATELETS 10*3/mm3 198       Results from last 7 days  Lab Units 12/24/17  1748   PH, ARTERIAL pH units 7.337   PO2 ART mm Hg 113.0*   PCO2, ARTERIAL mm Hg 63.2*   HCO3 ART mmol/L 33.9*       Results from last 7 days  Lab Units 12/24/17  1533   SODIUM mmol/L 144   POTASSIUM mmol/L 3.8   CHLORIDE mmol/L 102   CO2 mmol/L 37.0*   BUN mg/dL 22*   CREATININE mg/dL 1.20   EGFR IF NONAFRICN AM mL/min/1.73 60*   CALCIUM mg/dL 9.7   GLUCOSE mg/dL 102*   ALBUMIN g/dL 3.80   BILIRUBIN mg/dL 0.6   ALK PHOS U/L 81   AST (SGOT) U/L 28   ALT (SGPT) U/L 31   Estimated Creatinine Clearance: 54.4 mL/min (by C-G formula based on Cr of 1.2).  No results found for: AMMONIA    Results from last 7 days  Lab Units 12/24/17  1533   TROPONIN I ng/mL 0.079*       Results from last 7 days  Lab Units 12/24/17  1533   PROBNP pg/mL 1230.0*     Lab Results   Component Value Date    HGBA1C 5.60 11/13/2017     Lab Results   Component Value Date    TSH 0.904 11/13/2017     No results found for: PREGTESTUR, PREGSERUM, HCG, HCGQUANT  Pain Management Panel     There is no flowsheet data to display.            Radiology:    Imaging Results (most recent)     Procedure Component Value Units Date/Time    XR Chest 1 View [[id number]] Updated:  12/24/17 5632        Assessment:    · Acute exacerbation of COPD, present on admission  · Acute on chronic hypoxic hypercapnic respiratory failure secondary to COPD exacerbation, present on admission  · Suspected acute bronchitis vs early community acquired pneumonia, present on admission  · Elevated troponin, likely secondary to above, present on admission  · Obstructive sleep apnea hypopnea syndrome with poor adherence to BIPAP  · Chronic essential hypertension, uncontrolled on presentation,  improved  · Right bundle branch block, noted on prior EKG  · History of VSD, S/P surgery  · Multiple medical problems per above documentation    Plan:     · Patient is admitted to Med-Surg floor with telemetry on the hospitalist service  · Bronchodilator nebulizers, IV steroids, mucolytics, O2 supplementation and BIPAP  · Emperic IV antibiotics in the form of Ceftrioxone and Azithromycin for suspected acute bronchitis vs pneumonia  · Blood cultures ordered  · Repeat troponin in am  · Insulin correction dose coverage for expected sterid induced hyperglycemia  · DVT prophylaxis/Lovenox  · Preadmission medications reviewed and reconciled  · Code status: FULL CODE per patient's wishes; he however wishes to differ life prolonging measures in the event of poor prognosis  · I had a detailed discussion with Mr. Pena regarding his condition and management plan while his son in law was at the bedside, they expressed understanding and agreement with the same.     Janice Garcia MD  12/24/17  8:39 PM     Electronically signed by Janice Garcia MD at 12/25/2017  8:04 AM        Hospital Medications (active)       Dose Frequency Start End    albuterol (PROVENTIL) nebulizer solution 0.083% 2.5 mg/3mL 2.5 mg Every 6 Hours PRN 12/24/2017     Sig - Route: Take 2.5 mg by nebulization Every 6 (Six) Hours As Needed (Wheezing). - Nebulization    amLODIPine (NORVASC) tablet 5 mg 5 mg Every 24 Hours Scheduled 12/28/2017     Sig - Route: Take 1 tablet by mouth Daily. - Oral    Notes to Pharmacy: Hold if systolic <120    atorvastatin (LIPITOR) tablet 40 mg 40 mg Daily 12/25/2017     Sig - Route: Take 1 tablet by mouth Daily. - Oral    benzonatate (TESSALON) capsule 200 mg 200 mg 3 Times Daily PRN 12/24/2017     Sig - Route: Take 2 capsules by mouth 3 (Three) Times a Day As Needed for Cough. - Oral    bisacodyl (DULCOLAX) suppository 10 mg 10 mg Daily PRN 1/3/2018     Sig - Route: Insert 1 suppository into the rectum Daily As Needed for  Constipation. - Rectal    bisacodyl (DULCOLAX) suppository 10 mg 10 mg Daily 1/4/2018     Sig - Route: Insert 1 suppository into the rectum Daily. - Rectal    budesonide (PULMICORT) nebulizer solution 0.5 mg 0.5 mg 2 Times Daily - RT 12/24/2017     Sig - Route: Take 2 mL by nebulization 2 (Two) Times a Day. - Nebulization    carvedilol (COREG) tablet 3.125 mg 3.125 mg Every 12 Hours Scheduled 12/24/2017     Sig - Route: Take 1 tablet by mouth Every 12 (Twelve) Hours. - Oral    CHAPSTICK 1 each 1 each 2 Times Daily (BID) 12/31/2017     Sig - Route: Apply 1 each topically 2 (Two) Times a Day. - Apply externally    clopidogrel (PLAVIX) tablet 75 mg 75 mg Daily 12/25/2017     Sig - Route: Take 1 tablet by mouth Daily. - Oral    dextrose (D50W) solution 25 g 25 g Every 15 Minutes PRN 12/24/2017     Sig - Route: Infuse 50 mL into a venous catheter Every 15 (Fifteen) Minutes As Needed for Low Blood Sugar (Blood Sugar Less Than 70, Patient Has IV Access - Unresponsive, NPO or Unable To Safely Swallow). - Intravenous    dextrose (GLUTOSE) oral gel 1 tube 1 tube Every 15 Minutes PRN 12/24/2017     Sig - Route: Take 1 tube by mouth Every 15 (Fifteen) Minutes As Needed for Low Blood Sugar (Blood Sugar Less Than 70, Patient Alert, Is Not NPO & Can Safely Swallow). - Oral    DULoxetine (CYMBALTA) DR capsule 30 mg 30 mg Daily 12/30/2017     Sig - Route: Take 1 capsule by mouth Daily. - Oral    enoxaparin (LOVENOX) syringe 40 mg 40 mg Every 24 Hours 12/24/2017     Sig - Route: Inject 0.4 mL under the skin Daily. - Subcutaneous    finasteride (PROSCAR) tablet 5 mg 5 mg Daily 12/25/2017     Sig - Route: Take 1 tablet by mouth Daily. - Oral    fluticasone (FLONASE) 50 MCG/ACT nasal spray 1 spray 1 spray Daily 12/25/2017     Sig - Route: 1 spray into each nostril Daily. - Nasal    glucagon (human recombinant) (GLUCAGEN DIAGNOSTIC) injection 1 mg 1 mg Every 15 Minutes PRN 12/24/2017     Sig - Route: Inject 1 mg under the skin Every 15  (Fifteen) Minutes As Needed (Blood Glucose Less Than 70 - Patient Without IV Access - Unresponsive, NPO or Unable To Safely Swallow). - Subcutaneous    guaifenesin-dextromethorphan (MUCINEX DM) tablet 2 tablet 2 tablet 2 Times Daily (BID) 12/30/2017     Sig - Route: Take 2 tablets by mouth 2 (Two) Times a Day. - Oral    hydrALAZINE (APRESOLINE) injection 20 mg 20 mg Every 6 Hours PRN 12/29/2017     Sig - Route: Infuse 1 mL into a venous catheter Every 6 (Six) Hours As Needed for High Blood Pressure. - Intravenous    HYDROcod Polst-CPM Polst ER (TUSSIONEX PENNKINETIC) 10-8 MG/5ML ER suspension 5 mL 5 mL Every 12 Hours PRN 1/4/2018 1/14/2018    Sig - Route: Take 5 mL by mouth Every 12 (Twelve) Hours As Needed (cough). - Oral    HYDROcodone-acetaminophen (NORCO) 7.5-325 MG per tablet 1 tablet 1 tablet Every 4 Hours PRN 1/6/2018 1/16/2018    Sig - Route: Take 1 tablet by mouth Every 4 (Four) Hours As Needed for Moderate Pain  or Severe Pain . - Oral    HYDROmorphone (DILAUDID) injection 0.5 mg 0.5 mg Every 4 Hours PRN 12/30/2017     Sig - Route: Infuse 0.5 mL into a venous catheter Every 4 (Four) Hours As Needed for Severe Pain . - Intravenous    ipratropium-albuterol (DUO-NEB) nebulizer solution 3 mL 3 mL Every 6 Hours While Awake - RT 1/4/2018     Sig - Route: Take 3 mL by nebulization Every 6 (Six) Hours While Awake. - Nebulization    LORazepam (ATIVAN) injection 0.5 mg 0.5 mg Every 6 Hours PRN 12/30/2017     Sig - Route: Infuse 0.25 mL into a venous catheter Every 6 (Six) Hours As Needed for Anxiety. - Intravenous    losartan (COZAAR) tablet 50 mg 50 mg Daily 12/28/2017     Sig - Route: Take 1 tablet by mouth Daily. - Oral    megestrol acetate (MEGACE) oral suspension 800 mg 800 mg Daily 1/3/2018     Sig - Route: Take 20 mL by mouth Daily. - Oral    metaxalone (SKELAXIN) tablet 400 mg 400 mg 3 Times Daily 1/4/2018     Sig - Route: Take 0.5 tablets by mouth 3 (Three) Times a Day. - Oral    methylPREDNISolone sodium  succinate (SOLU-Medrol) injection 40 mg 40 mg Every 24 Hours 2018     Sig - Route: Infuse 1 mL into a venous catheter Daily. - Intravenous    mirtazapine (REMERON) tablet 15 mg 15 mg Nightly 2017     Sig - Route: Take 1 tablet by mouth Every Night. - Oral    nitroglycerin (NITROSTAT) SL tablet 0.4 mg 0.4 mg Every 5 Minutes PRN 2017     Sig - Route: Place 1 tablet under the tongue Every 5 (Five) Minutes As Needed for Chest Pain. - Sublingual    oxybutynin (DITROPAN) tablet 5 mg 5 mg Every Morning 2017     Sig - Route: Take 1 tablet by mouth Every Morning. - Oral    pantoprazole (PROTONIX) EC tablet 40 mg 40 mg 2 Times Daily Before Meals 1/3/2018     Sig - Route: Take 1 tablet by mouth 2 (Two) Times a Day Before Meals. - Oral    polyethylene glycol 3350 powder (packet) 17 g Daily 2018     Sig - Route: Take 17 g by mouth Daily. - Oral    sennosides-docusate sodium (SENOKOT-S) 8.6-50 MG tablet 2 tablet 2 tablet Nightly 1/3/2018     Sig - Route: Take 2 tablets by mouth Every Night. - Oral    sodium chloride 0.9 % flush 1-10 mL 1-10 mL As Needed 2017     Sig - Route: Infuse 1-10 mL into a venous catheter As Needed for Line Care. - Intravenous    tamsulosin (FLOMAX) 24 hr capsule 0.4 mg 0.4 mg Nightly 2017     Sig - Route: Take 1 capsule by mouth Every Night. - Oral    traMADol (ULTRAM) tablet 50 mg 50 mg Every 6 Hours PRN 2017     Sig - Route: Take 1 tablet by mouth Every 6 (Six) Hours As Needed for Moderate Pain . - Oral    traZODone (DESYREL) tablet 50 mg 50 mg Nightly 2018     Sig - Route: Take 1 tablet by mouth Every Night. - Oral             Physician Progress Notes (last 24 hours) (Notes from 2018  9:35 AM through 2018  9:35 AM)      Joshua Mendez DO at 2018  3:03 PM  Version 1 of 1               [facility]    [facility]    Name:  Thurman Mendel Parsons   Age:  67 y.o.  Sex:  male  :  1950  MRN:  [id number]   Visit  Number:  [id number]  Admission Date:  12/24/2017  Date Of Service:  01/07/18  Primary Care Physician:  Miguel Rojas MD     LOS: 14 days :  Patient Care Team:  Miguel Rojas MD as PCP - General  Miguel Rojas MD as PCP - Family Medicine:    History taken from:     patient    Chief Complaint:      Dyspnea    Subjective     Interval History:     Patient seen today.  Patient is a 67-year-old male with history of chronic respiratory failure secondary to COPD, PEDRO and hypertension.  He came in with acute on chronic hypoxic hypercapnic respiratory failure.  He has bilateral pneumonia treated with vancomycin, Fortaz and Levaquin which are now all discontinued.  He is also on Solu-Medrol.  He is on high flow oxygen as well as BiPAP at night.      He seems to be improving.  We'll attempt to wean down his oxygen again today, as he was anxious yesterday.  Denies any chest pressure, nausea, vomiting, or pain.  He is working with PT and OT.  Hopefully he can go to rehabilitation soon.    Review of Systems:     All systems were reviewed and negative except for:  Respiratory: positive for  shortness of air    Objective     Vital Signs:    Temp:  [97.4 °F (36.3 °C)-98.6 °F (37 °C)] 98.2 °F (36.8 °C)  Heart Rate:  [82-90] 90  Resp:  [18-24] 18  BP: (105-126)/(49-69) 105/49    Physical Exam:      General Appearance:    Alert, cooperative, in no acute distress   Head:    Normocephalic, without obvious abnormality, atraumatic   Eyes:            Lids and lashes normal, conjunctivae and sclerae normal, no   icterus, no pallor, corneas clear, PERRLA   Ears:    Ears appear intact with no abnormalities noted   Throat:   No oral lesions, no thrush, oral mucosa moist   Neck:   No adenopathy, supple, trachea midline, no thyromegaly, no   carotid bruit, no JVD   Back:     No kyphosis present, no scoliosis present, no skin lesions,      erythema or scars, no tenderness to percussion or                   palpation,   range of motion normal    Lungs:    Rhonchi bilaterally without uses accessory muscles respiration.      Heart:    Regular rhythm and normal rate, normal S1 and S2, no            murmur, no gallop, no rub, no click   Chest Wall:    No abnormalities observed   Abdomen:     Normal bowel sounds, no masses, no organomegaly, soft        non-tender, non-distended, no guarding, no rebound                tenderness   Rectal:     Deferred   Extremities:   4/5 strength in upper/lower extremities bilaterally.     Pulses:   Pulses palpable and equal bilaterally   Skin:   No bleeding, bruising or rash   Lymph nodes:   No palpable adenopathy   Neurologic:   Cranial nerves 2 - 12 grossly intact, sensation intact, DTR       present and equal bilaterally        Results Review:      I reviewed the patient's new clinical results.    Labs:    Lab Results (last 24 hours)     Procedure Component Value Units Date/Time    CBC (No Diff) [[id number]]  (Abnormal) Collected:  01/07/18 0705    Specimen:  Blood Updated:  01/07/18 0715     WBC 14.62 (H) 10*3/mm3      RBC 3.68 (L) 10*6/mm3      Hemoglobin 10.9 (L) g/dL      Hematocrit 33.8 (L) %      MCV 91.8 fL      MCH 29.6 pg      MCHC 32.2 g/dL      RDW 12.4 %      RDW-SD 41.8 fl      MPV 9.1 fL      Platelets 199 10*3/mm3     Renal Function Panel [[id number]]  (Abnormal) Collected:  01/07/18 0705    Specimen:  Blood Updated:  01/07/18 0728     Glucose 101 (H) mg/dL      BUN 29 (H) mg/dL      Creatinine 0.90 mg/dL      Sodium 143 mmol/L      Potassium 3.9 mmol/L      Chloride 104 mmol/L      CO2 33.0 (H) mmol/L      Calcium 9.0 mg/dL      Albumin 3.00 (L) g/dL      Phosphorus 3.0 mg/dL      Anion Gap 9.9 mmol/L      BUN/Creatinine Ratio 32.2 (H)     eGFR Non African Amer 84 mL/min/1.73     Narrative:       Abnormal estimated GFR should be followed by more specific studies to confirm end stage chronic renal disease. The equation used for calculation may not be accurate for patients less than 19 years old, greater than  70 years old, patients at extremes of weight, malnutrition, or with acute renal dysfunction.    Magnesium [[id number]]  (Normal) Collected:  01/07/18 0705    Specimen:  Blood Updated:  01/07/18 0728     Magnesium 2.1 mg/dL            Radiology:    Imaging Results (last 24 hours)     ** No results found for the last 24 hours. **          Medication Review:       amLODIPine 5 mg Oral Q24H   atorvastatin 40 mg Oral Daily   bisacodyl 10 mg Rectal Daily   budesonide 0.5 mg Nebulization BID - RT   carvedilol 3.125 mg Oral Q12H   CHAPSTICK 1 each Apply externally BID   clopidogrel 75 mg Oral Daily   DULoxetine 30 mg Oral Daily   enoxaparin 40 mg Subcutaneous Q24H   finasteride 5 mg Oral Daily   fluticasone 1 spray Nasal Daily   guaifenesin-dextromethorphan 2 tablet Oral BID   ipratropium-albuterol 3 mL Nebulization Q6H While Awake - RT   losartan 50 mg Oral Daily   megestrol acetate 800 mg Oral Daily   metaxalone 400 mg Oral TID   methylPREDNISolone sodium succinate 40 mg Intravenous Q24H   mirtazapine 15 mg Oral Nightly   oxybutynin 5 mg Oral QAM   pantoprazole 40 mg Oral BID AC   polyethylene glycol 17 g Oral Daily   sennosides-docusate sodium 2 tablet Oral Nightly   tamsulosin 0.4 mg Oral Nightly   traZODone 50 mg Oral Nightly            Assessment/Plan     Problem List Items Addressed This Visit     Impaired mobility and ADLs    Acute exacerbation of chronic obstructive pulmonary disease (COPD) - Primary    Relevant Medications    BREO ELLIPTA 200-25 MCG/INH aerosol powder     PROAIR  (90 BASE) MCG/ACT inhaler    budesonide (PULMICORT) 0.5 MG/2ML nebulizer solution    ipratropium-albuterol (DUO-NEB) 0.5-2.5 mg/mL nebulizer    predniSONE (DELTASONE) 10 MG tablet    benzonatate (TESSALON) 200 MG capsule    HYDROcod Polst-CPM Polst ER (TUSSIONEX PENNKINETIC) 10-8 MG/5ML ER suspension    pseudoephedrine-guaifenesin (MUCINEX D)  MG per 12 hr tablet    fluticasone (FLONASE) 50 MCG/ACT nasal spray    predniSONE  (DELTASONE) 10 MG tablet      Other Visit Diagnoses     Acute on chronic respiratory failure with hypercapnia              Acute exacerbation of chronic obstructive pulmonary disease (COPD)  Bilateral pneumonia, present on admission  Elevated troponin, Suspect type II MI  Hypertension  Right bundle-branch block  Obstructive sleep apnea with noncompliance to BiPAP  Acute on chronic hypoxic respiratory failure With hypercapnia  Left hip pain  Decreased oral intake  Sciatica    Plan:    Continue to try to wean down his FiO2.  Once the patient is on nasal cannula, and BiPAP at night, he can be sent to rehabilitation.  Check lab work in the a.m.  Further recommendations will depend on the clinical course.    Joshua Mendez DO  01/07/18  3:04 PM             Electronically signed by Joshua Mendez DO at 1/7/2018  3:06 PM           Physical Therapy Notes (last 24 hours) (Notes from 1/7/2018  9:35 AM through 1/8/2018  9:35 AM)      Rebeca Sawyer, HELADIO at 1/7/2018  6:08 PM  Version 1 of 1         Problem: Patient Care Overview (Adult)  Goal: Plan of Care Review  Outcome: Ongoing (interventions implemented as appropriate)   01/07/18 1551   Coping/Psychosocial Response Interventions   Plan Of Care Reviewed With patient   Patient Care Overview   Progress improving   Outcome Evaluation   Outcome Summary/Follow up Plan Pt requested to hold amb today d/t fatigue however was agreeable to perform ex . Con't with PT POC       Problem: Inpatient Physical Therapy  Goal: Bed Mobility Goal LTG- PT  Outcome: Ongoing (interventions implemented as appropriate)   12/28/17 1711 01/07/18 1551   Bed Mobility PT LTG   Bed Mobility PT LTG, Date Established 12/28/17 --    Bed Mobility PT LTG, Time to Achieve 2 wks --    Bed Mobility PT LTG, Activity Type all bed mobility --    Bed Mobility PT LTG, [place] Level minimum assist (75% patient effort) --    Bed Mobility PT LTG, Outcome --  goal partially met     Goal: Transfer Training  Goal 1 LTG- PT  Outcome: Ongoing (interventions implemented as appropriate)   17 1711 18 1551   Transfer Training PT LTG   Transfer Training PT LTG, Date Established 17 --    Transfer Training PT LTG, Time to Achieve 2 wks --    Transfer Training PT LTG, Activity Type sit to stand/stand to sit;bed to chair /chair to bed --    Transfer Training PT LTG, [place] Level minimum assist (75% patient effort) --    Transfer Training PT LTG, Assist Device walker, rolling --    Transfer Training PT LTG, Outcome --  goal ongoing     Goal: Gait Training Goal LTG- PT  Outcome: Ongoing (interventions implemented as appropriate)   17 1711 18 1551   Gait Training PT LTG   Gait Training Goal PT LTG, Date Established 17 --    Gait Training Goal PT LTG, Time to Achieve 2 wks --    Gait Training Goal PT LTG, [place] Level minimum assist (75% patient effort) --    Gait Training Goal PT LTG, Assist Device walker, rolling --    Gait Training Goal PT LTG, Distance to Achieve 25 --    Gait Training Goal PT LTG, Outcome --  goal ongoing            Electronically signed by Rebeca Sawyer PTA at 2018  6:08 PM      Rebeca Sawyer PTA at 2018  6:10 PM  Version 1 of 1         Acute Care - Physical Therapy Treatment Note  CARLOS Shane     Patient Name: Thurman Mendel Parsons  : 1950  MRN: [id number]  Today's Date: 2018  Onset of Illness/Injury or Date of Surgery Date: 17  Date of Referral to PT: 17  Referring Physician: Dr. Huber    Admit Date: 2017    Visit Dx:    ICD-10-CM ICD-9-CM   1. Acute exacerbation of chronic obstructive pulmonary disease (COPD) J44.1 491.21   2. Acute on chronic respiratory failure with hypercapnia J96.22 518.84   3. Impaired mobility and ADLs Z74.09 799.89   4. Impaired functional mobility, balance, gait, and endurance Z74.09 V49.89     Patient Active Problem List   Diagnosis   • Anxiety   • Atherosclerotic heart disease of native  coronary artery without angina pectoris   • Atrial fibrillation   • Chronic kidney disease, stage III (moderate)   • Steroid-dependent COPD   • Degeneration of cervical intervertebral disc   • Diabetes mellitus   • GERD without esophagitis   • Diverticulosis   • Hemorrhoids   • Hiatal hernia   • Hyperlipidemia   • Hypertension   • Kyphosis, acquired   • Mitral and aortic valve disease   • Phimosis   • Sleep apnea   • Enlarged prostate without lower urinary tract symptoms (luts)   • History of arthritis   • Back pain   • Depression   • Stroke syndrome   • History of ventricular septal defect   • Neck pain   • Impaired mobility and ADLs   • Physical deconditioning   • Acute exacerbation of chronic obstructive pulmonary disease (COPD)               Adult Rehabilitation Note       01/07/18 1551 01/06/18 0133 01/05/18 1603    Rehab Assessment/Intervention    Discipline physical therapy assistant  -CC physical therapy assistant  -CK occupational therapist  -SD    Document Type therapy note (daily note)  -CC therapy note (daily note)  -CK therapy note (daily note)  -SD    Subjective Information agree to therapy  -CC agree to therapy  -CK agree to therapy;complains of;pain  -SD    Patient Effort, Rehab Treatment good  -CC good  -CK good  -SD    Symptoms Noted During/After Treatment shortness of breath  -CC shortness of breath  -CK fatigue;shortness of breath  -SD    Symptoms Noted Comment O2 SATS >96% during therapy  -CC      Precautions/Limitations fall precautions;oxygen therapy device and L/min  -CC fall precautions;oxygen therapy device and L/min  -CK fall precautions;oxygen therapy device and L/min  -SD    Recorded by [CC] Rebeca Sawyer, PTA [CK] Jessie Davenport, PTA [SD] Kelle Edwards OT    Vital Signs    Pre SpO2 (%)   93  -SD    O2 Delivery Pre Treatment  supplemental O2  -CK supplemental O2   High flow  -SD    Intra SpO2 (%)   89  -SD    O2 Delivery Intra Treatment  supplemental O2  -CK supplemental O2   high  flow  -SD    Post SpO2 (%)   90  -SD    O2 Delivery Post Treatment  supplemental O2  -CK supplemental O2   high flow  -SD    Recorded by  [CK] Jessie Davenport PTA [SD] Kelle Edwards OT    Pain Assessment    Pain Assessment 0-10  -CC 0-10  -CK 0-10  -SD    Pain Score 6  -CC 6  -CK 6  -SD    Post Pain Score 6  -CC 6  -CK 6  -SD    Pain Type Acute pain  -CC Acute pain  -CK Acute pain  -SD    Pain Location Hip  -CC Hip  -CK Hip  -SD    Pain Orientation Left  -CC Left  -CK Left  -SD    Pain Intervention(s) Repositioned  -CC Repositioned  -CK Repositioned  -SD    Response to Interventions  --   tolerated  -CK Tolerated  -SD    Recorded by [CC] Rebeca Sawyer PTA [CK] Jessie Davenport PTA [SD] Kelle Edwards OT    Vision Assessment/Intervention    Visual Impairment WFL with corrective lenses  -CC      Recorded by [CC] Rebeca Sawyer PTA      Bed Mobility, Assessment/Treatment    Bed Mobility, Assistive Device  bed rails;head of bed elevated  -CK bed rails;head of bed elevated  -SD    Bed Mob, Supine to Sit, [place]  supervision required  -CK supervision required  -SD    Recorded by  [CK] Jessie Davenport PTA [SD] Kelle Edwards OT    Transfer Assessment/Treatment    Transfers, Bed-Chair [place]  minimum assist (75% patient effort);contact guard assist  -CK contact guard assist  -SD    Transfers, Bed-Chair-Bed, Assist Device   rolling walker  -SD    Transfers, Sit-Stand [place]  contact guard assist  -CK contact guard assist  -SD    Transfers, Stand-Sit [place]  contact guard assist  -CK contact guard assist  -SD    Transfers, Sit-Stand-Sit, Assist Device  rolling walker  -CK rolling walker  -SD    Transfer, Comment   Patient completed several sit to stand transfers EOB during bathing task using RW  -SD    Recorded by  [CK] Jessie Davenport PTA [SD] Kelle Edwards OT    Gait Assessment/Treatment    Gait, [place] Level  minimum assist (75% patient effort)  -CK     Gait, Distance  (Feet)  3  -CK     Gait, Gait Pattern Analysis  swing-to gait  -CK     Gait, Gait Deviations  double stance time increased;antalgic;left:;step length decreased;weight-shifting ability decreased  -CK     Gait, Comment  difficulty advancing L foot d/t pain in hip  -CK     Recorded by  [CK] Jessie Davenport, HELADIO     Functional Mobility    Functional Mobility- Ind. Level   contact guard assist  -SD    Functional Mobility- Device   rolling walker  -SD    Functional Mobility-Distance (Feet)   5  -SD    Recorded by   [SD] Kelle Edwards OT    Upper Body Bathing Assessment/Training    UB Bathing Assess/Train, [place] Level   minimum assist (75% patient effort)  -SD    Recorded by   [SD] Kelle Edwards OT    Lower Body Bathing Assessment/Training    LB Bathing Assess/Train, [place] Level   moderate assist (50% patient effort)  -SD    Recorded by   [SD] Kelle Edwards OT    Lower Body Dressing Assessment/Training    LB Dressing Assess/Train, [place]   minimum assist (75% patient effort)  -SD    Recorded by   [SD] Kelle Edwards OT    Grooming Assessment/Training    Grooming Assess/Train, [place] Level   minimum assist (75% patient effort)  -SD    Grooming Assess/Train, Comment   Min A for shaving for safety tp prevent cuts  -SD    Recorded by   [SD] Kelle Edwards OT    Therapy Exercises    Bilateral Lower Extremities AROM:;15 reps;supine;ankle pumps/circles;glut sets;heel slides;hip abduction/adduction;quad sets;SAQ  -CC 15 reps;AROM:;sitting;ankle pumps/circles;hip abduction/adduction;hip flexion;LAQ  -CK     Bilateral Upper Extremity   AROM:;15 reps;elbow flexion/extension;shoulder extension/flexion  -SD    Recorded by [CC] Rebeca Sawyer, PTA [CK] Jessie Davenport, HELADIO [SD] Kelle Edwards OT    Positioning and Restraints    Pre-Treatment Position in bed  -CC in bed  -CK in bed  -SD    Post Treatment Position bed  -CC chair  -CK chair  -SD    In Bed fowlers;encouraged to call for assist  -CC  sitting;encouraged to call for assist  -CK     In Chair   sitting;call light within reach;encouraged to call for assist  -SD    Recorded by [CC] Rebeca Sawyer PTA [CK] Jessie Davenport PTA [SD] Kelle Edwards, OT      01/05/18 0344          Rehab Assessment/Intervention    Discipline physical therapy assistant  -CK      Document Type therapy note (daily note)  -CK      Subjective Information agree to therapy;complains of;fatigue;pain  -CK      Patient Effort, Rehab Treatment adequate  -CK      Symptoms Noted During/After Treatment fatigue;shortness of breath  -CK      Precautions/Limitations fall precautions;oxygen therapy device and L/min  -CK      Recorded by [CK] Jessie Davenport PTA      Vital Signs    O2 Delivery Pre Treatment supplemental O2  -CK      O2 Delivery Intra Treatment supplemental O2  -CK      O2 Delivery Post Treatment supplemental O2  -CK      Recorded by [CK] Jessie Davenport PTA      Pain Assessment    Pain Assessment 0-10  -CK      Pain Score 6  -CK      Post Pain Score 6  -CK      Pain Type Acute pain  -CK      Pain Location Hip  -CK      Pain Orientation Left  -CK      Pain Intervention(s) Repositioned  -CK      Response to Interventions --   tolerated  -CK      Recorded by [CK] Jessie Davenport PTA      Bed Mobility, Assessment/Treatment    Bed Mob, Sit to Supine, [place] contact guard assist  -CK      Bed Mobility, Impairments strength decreased;impaired balance  -CK      Recorded by [CK] Jessie Davenport PTA      Therapy Exercises    Bilateral Lower Extremities AROM:;15 reps;sitting;ankle pumps/circles;hip abduction/adduction;LAQ  -CK      Recorded by [CK] Jessie Davenport PTA      Positioning and Restraints    Pre-Treatment Position sitting in chair/recliner  -CK      Post Treatment Position bed  -CK      In Chair encouraged to call for assist;call light within reach  -CK      Recorded by [CK] Jessie Davenport PTA        User Key  (r) = Recorded By, (t) = Taken By, (c) =  Cosigned By    Initials Name Effective Dates    CC Rebeca Sawyer, PTA 10/26/16 -     CK Jessie Davenport, PTA 10/26/16 -     SD Kelle Edwards, OT 06/30/17 -                 IP PT Goals       01/07/18 1551 01/06/18 1441 01/05/18 1747    Bed Mobility PT LTG    Bed Mobility PT LTG, Outcome goal partially met  -CC goal partially met  -CK goal partially met  -CK    Transfer Training PT LTG    Transfer Training PT LTG, Outcome goal ongoing  -CC goal ongoing  -CK goal ongoing  -CK    Gait Training PT LTG    Gait Training Goal PT LTG, Outcome goal ongoing  -CC goal ongoing  -CK goal ongoing  -CK      01/04/18 1532 01/03/18 1329 01/02/18 1551    Bed Mobility PT LTG    Bed Mobility PT LTG, Outcome goal partially met  -LM goal ongoing  -LM goal ongoing  -RM    Transfer Training PT LTG    Transfer Training PT LTG, Outcome goal partially met  -LM goal ongoing  -LM goal ongoing  -RM    Gait Training PT LTG    Gait Training Goal PT LTG, Outcome goal ongoing  -LM goal ongoing  -LM       12/31/17 1532 12/28/17 1711       Bed Mobility PT LTG    Bed Mobility PT LTG, Date Established  12/28/17  -JR     Bed Mobility PT LTG, Time to Achieve  2 wks  -JR     Bed Mobility PT LTG, Activity Type  all bed mobility  -JR     Bed Mobility PT LTG, [place] Level  minimum assist (75% patient effort)  -JR     Bed Mobility PT LTG, Outcome goal ongoing  -JR      Transfer Training PT LTG    Transfer Training PT LTG, Date Established  12/28/17  -JR     Transfer Training PT LTG, Time to Achieve  2 wks  -JR     Transfer Training PT LTG, Activity Type  sit to stand/stand to sit;bed to chair /chair to bed  -JR     Transfer Training PT LTG, [place] Level  minimum assist (75% patient effort)  -JR     Transfer Training PT LTG, Assist Device  walker, rolling  -JR     Transfer Training PT LTG, Outcome goal ongoing  -JR      Gait Training PT LTG    Gait Training Goal PT LTG, Date Established  12/28/17  -JR     Gait Training Goal PT LTG, Time to  Achieve  2 wks  -JR     Gait Training Goal PT LTG, [place] Level  minimum assist (75% patient effort)  -JR     Gait Training Goal PT LTG, Assist Device  walker, rolling  -JR     Gait Training Goal PT LTG, Distance to Achieve  25  -JR     Gait Training Goal PT LTG, Outcome goal ongoing  -JR        User Key  (r) = Recorded By, (t) = Taken By, (c) = Cosigned By    Initials Name Provider Type    JR Kaylee Kurtz, PT Physical Therapist    LM Xochitl Hernandez, PT Physical Therapist    CC Rebeca Sawyer, PTA Physical Therapy Assistant    CK Jessie Davenport, PTA Physical Therapy Assistant    RM Zenon Rothman, PTA Physical Therapy Assistant          Physical Therapy Education     Title: PT OT SLP Therapies (Active)     Topic: Physical Therapy (Done)     Point: Mobility training (Done)    Learning Progress Summary    Learner Readiness Method Response Comment Documented by Status   Patient Acceptance E VU  CK 01/06/18 1441 Done    Acceptance E VU  CK 01/05/18 1747 Done    Acceptance E VU Ther ex for HEP; safety techniques for transfers.  01/04/18 1532 Done    Acceptance E VU Purpose of PT; PLB/pacing to alleviate SOA with activity.  01/03/18 1328 Done    Acceptance E,D VU,NR   01/02/18 1550 Done    Acceptance E NR   01/02/18 0334 Active    Acceptance E VU Importance of mobility to recovery JR 12/28/17 1706 Done   Family Acceptance E VU Purpose of PT; PLB/pacing to alleviate SOA with activity.  01/03/18 1328 Done               Point: Home exercise program (Done)    Learning Progress Summary    Learner Readiness Method Response Comment Documented by Status   Patient Acceptance E VU  CK 01/06/18 1441 Done    Acceptance E VU  CK 01/05/18 1747 Done    Acceptance E VU Ther ex for HEP; safety techniques for transfers.  01/04/18 1532 Done    Acceptance E VU Purpose of PT; PLB/pacing to alleviate SOA with activity.  01/03/18 1328 Done    Acceptance E,D VU,NR   01/02/18 1550 Done    Acceptance E NR   01/02/18 0334  Active    Acceptance E VU instructed on the importance of exercises to improving mobility  12/31/17 1523 Done   Family Acceptance E VU Purpose of PT; PLB/pacing to alleviate SOA with activity.  01/03/18 1328 Done    Acceptance E VU instructed on the importance of exercises to improving mobility  12/31/17 1523 Done               Point: Body mechanics (Done)    Learning Progress Summary    Learner Readiness Method Response Comment Documented by Status   Patient Acceptance E VU  CK 01/06/18 1441 Done               Point: Precautions (Done)    Learning Progress Summary    Learner Readiness Method Response Comment Documented by Status   Patient Acceptance E VU  CK 01/06/18 1441 Done    Acceptance E VU  CK 01/05/18 1747 Done    Acceptance E VU Ther ex for HEP; safety techniques for transfers.  01/04/18 1532 Done    Acceptance E VU Purpose of PT; PLB/pacing to alleviate SOA with activity.  01/03/18 1328 Done   Family Acceptance E VU Purpose of PT; PLB/pacing to alleviate SOA with activity.  01/03/18 1328 Done                      User Key     Initials Effective Dates Name Provider Type Discipline     10/26/16 -  Kaylee Kurtz, PT Physical Therapist PT     10/26/16 -  Xochitl Hernandez, PT Physical Therapist PT     10/26/16 -  Jessie Davenport, PTA Physical Therapy Assistant PT     10/26/16 -  Zenon Rothman, PTA Physical Therapy Assistant PT     08/31/17 -  Mayda Nguyen, RN Registered Nurse Nurse                    PT Recommendation and Plan  Anticipated Discharge Disposition: inpatient rehabilitation facility  Planned Therapy Interventions: balance training, strengthening, bed mobility training, transfer training, gait training, patient/family education  PT Frequency: daily  Plan of Care Review  Plan Of Care Reviewed With: patient  Progress: improving  Outcome Summary/Follow up Plan: Pt requested to hold amb today d/t fatigue however was agreeable to perform ex . Con't with PT POC          Outcome  Measures       01/06/18 0133 01/05/18 1603 01/05/18 0344    How much help from another person do you currently need...    Turning from your back to your side while in flat bed without using bedrails? 3  -CK  3  -CK    Moving from lying on back to sitting on the side of a flat bed without bedrails? 3  -CK  3  -CK    Moving to and from a bed to a chair (including a wheelchair)? 3  -CK  3  -CK    Standing up from a chair using your arms (e.g., wheelchair, bedside chair)? 3  -CK  3  -CK    Climbing 3-5 steps with a railing? 1  -CK  1  -CK    To walk in hospital room? 2  -CK  2  -CK    AM-PAC 6 Clicks Score 15  -CK  15  -CK    How much help from another is currently needed...    Putting on and taking off regular lower body clothing?  3  -SD     Bathing (including washing, rinsing, and drying)  3  -SD     Toileting (which includes using toilet bed pan or urinal)  3  -SD     Putting on and taking off regular upper body clothing  3  -SD     Taking care of personal grooming (such as brushing teeth)  3  -SD     Eating meals  4  -SD     Score  19  -SD     Functional Assessment    Outcome Measure Options AM-PAC 6 Clicks Basic Mobility (PT)  -CK AM-PAC 6 Clicks Daily Activity (OT)  -SD AM-PAC 6 Clicks Basic Mobility (PT)  -CK      User Key  (r) = Recorded By, (t) = Taken By, (c) = Cosigned By    Initials Name Provider Type    CK Jessie Davenport PTA Physical Therapy Assistant    TONY Edwards OT Occupational Therapist           Time Calculation:         PT Charges       01/07/18 1551          Time Calculation    Start Time 1551  -CC      PT Received On 01/07/18  -      PT Goal Re-Cert Due Date 01/07/18  -      Time Calculation- PT    Total Timed Code Minutes- PT 25 minute(s)  -CC        User Key  (r) = Recorded By, (t) = Taken By, (c) = Cosigned By    Initials Name Provider Type    CC Rebeca Sawyer PTA Physical Therapy Assistant          Therapy Charges for Today     Code Description Service Date Service Provider  Modifiers Qty    [id number] HC PT THER PROC EA 15 MIN 1/7/2018 Rebeca Sawyer PTA GP 1          PT G-Codes  Outcome Measure Options: AM-PAC 6 Clicks Basic Mobility (PT)    Rebeac Sawyer PTA  1/7/2018          Electronically signed by Rebeca Sawyer PTA at 1/7/2018  6:10 PM        Occupational Therapy Notes (last 24 hours) (Notes from 1/7/2018  9:35 AM through 1/8/2018  9:35 AM)     No notes of this type exist for this encounter.

## 2018-01-08 NOTE — PLAN OF CARE
Problem: NPPV/CPAP (Adult)  Goal: Signs and Symptoms of Listed Potential Problems Will be Absent or Manageable (NPPV/CPAP)  Outcome: Ongoing (interventions implemented as appropriate)   01/08/18 0252   NPPV/CPAP   Problems Assessed (NPPV/CPAP) dry mucous membranes;hypoxia/hypoxemia;situational response;skin breakdown   Problems Present (NPPV/CPAP) none

## 2018-01-08 NOTE — PLAN OF CARE
Problem: Patient Care Overview (Adult)  Goal: Plan of Care Review  Outcome: Ongoing (interventions implemented as appropriate)   01/08/18 5035   Coping/Psychosocial Response Interventions   Plan Of Care Reviewed With patient     Goal: Adult Individualization and Mutuality  Outcome: Ongoing (interventions implemented as appropriate)    Goal: Discharge Needs Assessment  Outcome: Ongoing (interventions implemented as appropriate)      Problem: COPD, Chronic Bronchitis/Emphysema (Adult)  Goal: Signs and Symptoms of Listed Potential Problems Will be Absent or Manageable (COPD, Chronic Bronchitis/Emphysema)  Outcome: Ongoing (interventions implemented as appropriate)      Problem: Respiratory Insufficiency (Adult)  Goal: Acid/Base Balance  Outcome: Ongoing (interventions implemented as appropriate)    Goal: Effective Ventilation  Outcome: Ongoing (interventions implemented as appropriate)      Problem: NPPV/CPAP (Adult)  Goal: Signs and Symptoms of Listed Potential Problems Will be Absent or Manageable (NPPV/CPAP)  Outcome: Ongoing (interventions implemented as appropriate)      Problem: Fall Risk (Adult)  Goal: Absence of Falls  Outcome: Ongoing (interventions implemented as appropriate)

## 2018-01-08 NOTE — PLAN OF CARE
Problem: Patient Care Overview (Adult)  Goal: Plan of Care Review  Outcome: Ongoing (interventions implemented as appropriate)   01/07/18 2100   Coping/Psychosocial Response Interventions   Plan Of Care Reviewed With patient   Patient Care Overview   Progress improving   Outcome Evaluation   Outcome Summary/Follow up Plan pleasant patient with complaint of pain-medications given per physician's orders-possible discharge in a few days-monitoring day to day basis-monitor labs and continue to monitor patient     Goal: Adult Individualization and Mutuality  Outcome: Ongoing (interventions implemented as appropriate)    Goal: Discharge Needs Assessment  Outcome: Ongoing (interventions implemented as appropriate)      Problem: COPD, Chronic Bronchitis/Emphysema (Adult)  Goal: Signs and Symptoms of Listed Potential Problems Will be Absent or Manageable (COPD, Chronic Bronchitis/Emphysema)  Outcome: Ongoing (interventions implemented as appropriate)      Problem: Respiratory Insufficiency (Adult)  Goal: Acid/Base Balance  Outcome: Ongoing (interventions implemented as appropriate)    Goal: Effective Ventilation  Outcome: Ongoing (interventions implemented as appropriate)      Problem: Fall Risk (Adult)  Goal: Identify Related Risk Factors and Signs and Symptoms  Outcome: Outcome(s) achieved Date Met: 01/08/18    Goal: Absence of Falls  Outcome: Ongoing (interventions implemented as appropriate)

## 2018-01-08 NOTE — PROGRESS NOTES
[facility]    Name:  Thurman Mendel Parsons   Age:  67 y.o.  Sex:  male  :  1950  MRN:  [id number]   Visit Number:  [id number]  Admission Date:  2017  Date Of Service:  18  Primary Care Physician:  Miguel Rojas MD     LOS: 15 days :  Patient Care Team:  Miguel Rojas MD as PCP - General  Miguel Rojas MD as PCP - Family Medicine:    History taken from:     patient    Chief Complaint:      Dyspnea    Subjective     Interval History:     Patient seen today.  Patient is a 67-year-old male with history of chronic respiratory failure secondary to COPD, PEDRO and hypertension.  He came in with acute on chronic hypoxic hypercapnic respiratory failure.  He has bilateral pneumonia treated with vancomycin, Fortaz and Levaquin which are now all discontinued.  He is also on Solu-Medrol.  He is on high flow oxygen as well as BiPAP at night.  He seems to have less secretions.    Patient is having trouble lowering his FiO2.  Continues to sat in the upper 80s and low 90s.  He is eating more, participating with therapy, and does feel better.  Advised him he may need to go to an LTAC, he is agreeable to this.    Review of Systems:     All systems were reviewed and negative except for:  Respiratory: positive for  shortness of air    Objective     Vital Signs:    Temp:  [97.8 °F (36.6 °C)-98.6 °F (37 °C)] 98.6 °F (37 °C)  Heart Rate:  [] 80  Resp:  [18-23] 18  BP: ()/(49-77) 124/70    Physical Exam:      General Appearance:    Alert, cooperative, in no acute distress   Head:    Normocephalic, without obvious abnormality, atraumatic   Eyes:            Lids and lashes normal, conjunctivae and sclerae normal, no   icterus, no pallor, corneas clear, PERRLA   Ears:    Ears appear intact with no abnormalities noted   Throat:   No oral lesions, no thrush, oral mucosa moist   Neck:   No adenopathy, supple, trachea midline, no thyromegaly, no   carotid bruit, no  JVD   Back:     No kyphosis present, no scoliosis present, no skin lesions,      erythema or scars, no tenderness to percussion or                   palpation,   range of motion normal   Lungs:    Rhonchi bilaterally without uses accessory muscles respiration.      Heart:    Regular rhythm and normal rate, normal S1 and S2, no            murmur, no gallop, no rub, no click   Chest Wall:    No abnormalities observed   Abdomen:     Normal bowel sounds, no masses, no organomegaly, soft        non-tender, non-distended, no guarding, no rebound                tenderness   Rectal:     Deferred   Extremities:   4/5 strength in upper/lower extremity is bilaterally.     Pulses:   Pulses palpable and equal bilaterally   Skin:   No bleeding, bruising or rash   Lymph nodes:   No palpable adenopathy   Neurologic:   Cranial nerves 2 - 12 grossly intact, sensation intact, DTR       present and equal bilaterally        Results Review:      I reviewed the patient's new clinical results.    Labs:    Lab Results (last 24 hours)     Procedure Component Value Units Date/Time    CBC & Differential [[id number]] Collected:  01/08/18 0528    Specimen:  Blood Updated:  01/08/18 0625    Narrative:       The following orders were created for panel order CBC & Differential.  Procedure                               Abnormality         Status                     ---------                               -----------         ------                     CBC Auto Differential[[id number]]        Abnormal            Final result                 Please view results for these tests on the individual orders.    CBC Auto Differential [[id number]]  (Abnormal) Collected:  01/08/18 0528    Specimen:  Blood Updated:  01/08/18 0625     WBC 12.16 (H) 10*3/mm3      RBC 3.63 (L) 10*6/mm3      Hemoglobin 10.7 (L) g/dL      Hematocrit 33.7 (L) %      MCV 92.8 fL      MCH 29.5 pg      MCHC 31.8 g/dL      RDW 12.4 %      RDW-SD 42.5 fl      MPV 9.3 fL      Platelets 205  10*3/mm3      Neutrophil % 79.9 %      Lymphocyte % 7.7 (L) %      Monocyte % 10.6 %      Eosinophil % 1.3 %      Basophil % 0.1 %      Immature Grans % 0.4 %      Neutrophils, Absolute 9.71 (H) 10*3/mm3      Lymphocytes, Absolute 0.94 10*3/mm3      Monocytes, Absolute 1.29 (H) 10*3/mm3      Eosinophils, Absolute 0.16 10*3/mm3      Basophils, Absolute 0.01 10*3/mm3      Immature Grans, Absolute 0.05 10*3/mm3      nRBC 0.0 /100 WBC     Renal Function Panel [[id number]]  (Abnormal) Collected:  01/08/18 0528    Specimen:  Blood Updated:  01/08/18 0638     Glucose 84 mg/dL      BUN 28 (H) mg/dL      Creatinine 1.00 mg/dL      Sodium 143 mmol/L      Potassium 3.8 mmol/L      Chloride 104 mmol/L      CO2 34.0 (H) mmol/L      Calcium 8.9 mg/dL      Albumin 3.00 (L) g/dL      Phosphorus 2.8 mg/dL      Anion Gap 8.8 mmol/L      BUN/Creatinine Ratio 28.0 (H)     eGFR Non African Amer 75 mL/min/1.73     Narrative:       Abnormal estimated GFR should be followed by more specific studies to confirm end stage chronic renal disease. The equation used for calculation may not be accurate for patients less than 19 years old, greater than 70 years old, patients at extremes of weight, malnutrition, or with acute renal dysfunction.           Radiology:    Imaging Results (last 24 hours)     ** No results found for the last 24 hours. **          Medication Review:       amLODIPine 5 mg Oral Q24H   atorvastatin 40 mg Oral Daily   bisacodyl 10 mg Rectal Daily   budesonide 0.5 mg Nebulization BID - RT   carvedilol 3.125 mg Oral Q12H   CHAPSTICK 1 each Apply externally BID   clopidogrel 75 mg Oral Daily   DULoxetine 30 mg Oral Daily   enoxaparin 40 mg Subcutaneous Q24H   finasteride 5 mg Oral Daily   fluticasone 1 spray Nasal Daily   guaifenesin-dextromethorphan 2 tablet Oral BID   ipratropium-albuterol 3 mL Nebulization Q6H While Awake - RT   losartan 50 mg Oral Daily   megestrol acetate 800 mg Oral Daily   metaxalone 400 mg Oral TID    methylPREDNISolone sodium succinate 40 mg Intravenous Q24H   mirtazapine 15 mg Oral Nightly   oxybutynin 5 mg Oral QAM   pantoprazole 40 mg Oral BID AC   polyethylene glycol 17 g Oral Daily   sennosides-docusate sodium 2 tablet Oral Nightly   tamsulosin 0.4 mg Oral Nightly   traZODone 50 mg Oral Nightly            Assessment/Plan     Problem List Items Addressed This Visit     Impaired mobility and ADLs    Acute exacerbation of chronic obstructive pulmonary disease (COPD) - Primary    Relevant Medications    BREO ELLIPTA 200-25 MCG/INH aerosol powder     PROAIR  (90 BASE) MCG/ACT inhaler    budesonide (PULMICORT) 0.5 MG/2ML nebulizer solution    ipratropium-albuterol (DUO-NEB) 0.5-2.5 mg/mL nebulizer    predniSONE (DELTASONE) 10 MG tablet    benzonatate (TESSALON) 200 MG capsule    HYDROcod Polst-CPM Polst ER (TUSSIONEX PENNKINETIC) 10-8 MG/5ML ER suspension    pseudoephedrine-guaifenesin (MUCINEX D)  MG per 12 hr tablet    fluticasone (FLONASE) 50 MCG/ACT nasal spray    predniSONE (DELTASONE) 10 MG tablet      Other Visit Diagnoses     Acute on chronic respiratory failure with hypercapnia              Acute exacerbation of chronic obstructive pulmonary disease (COPD)  Bilateral pneumonia, present on admission  Elevated troponin, Suspect type II MI  Hypertension  Right bundle-branch block  Obstructive sleep apnea with noncompliance to BiPAP  Acute on chronic hypoxic respiratory failure With hypercapnia  Left hip pain  Decreased oral intake  Sciatica    Plan:    Continue to try to wean down his FiO2.  This has been unsuccessful thus far.  Likely patient will need LTAC.  Discussed this with the patient.  Also spoke to case management regarding this.  Patient will have lab work tomorrow morning.  Further recommendations will depend on the clinical course.    Joshua Mendez DO  01/08/18  1:41 PM

## 2018-01-08 NOTE — PROGRESS NOTES
Continued Stay Note  CARLOS Shane     Patient Name: Thurman Mendel Parsons  MRN: [id number]  Today's Date: 1/8/2018    Admit Date: 12/24/2017          Discharge Plan       01/08/18 0957    Case Management/Social Work Plan    Additional Comments Pt is still on High flow oxygen and cannot go to  SNF  Referred  to [facility]  Updated pt               Discharge Codes     None        Expected Discharge Date and Time     Expected Discharge Date Expected Discharge Time    Jan 8, 2018             Danielle Saldana

## 2018-01-08 NOTE — THERAPY TREATMENT NOTE
Acute Care - Physical Therapy Treatment Note   [place]     Patient Name: Thurman Mendel Parsons  : 1950  MRN: [id number]  Today's Date: 2018  Onset of Illness/Injury or Date of Surgery Date: 17  Date of Referral to PT: 17  Referring Physician: Dr. Huber    Admit Date: 2017    Visit Dx:    ICD-10-CM ICD-9-CM   1. Acute exacerbation of chronic obstructive pulmonary disease (COPD) J44.1 491.21   2. Acute on chronic respiratory failure with hypercapnia J96.22 518.84   3. Impaired mobility and ADLs Z74.09 799.89   4. Impaired functional mobility, balance, gait, and endurance Z74.09 V49.89     Patient Active Problem List   Diagnosis   • Anxiety   • Atherosclerotic heart disease of native coronary artery without angina pectoris   • Atrial fibrillation   • Chronic kidney disease, stage III (moderate)   • Steroid-dependent COPD   • Degeneration of cervical intervertebral disc   • Diabetes mellitus   • GERD without esophagitis   • Diverticulosis   • Hemorrhoids   • Hiatal hernia   • Hyperlipidemia   • Hypertension   • Kyphosis, acquired   • Mitral and aortic valve disease   • Phimosis   • Sleep apnea   • Enlarged prostate without lower urinary tract symptoms (luts)   • History of arthritis   • Back pain   • Depression   • Stroke syndrome   • History of ventricular septal defect   • Neck pain   • Impaired mobility and ADLs   • Physical deconditioning   • Acute exacerbation of chronic obstructive pulmonary disease (COPD)               Adult Rehabilitation Note       18 1411 18 1551 18 0133    Rehab Assessment/Intervention    Discipline physical therapy assistant  -RM physical therapy assistant  -CC physical therapy assistant  -CK    Document Type therapy note (daily note)  -RM therapy note (daily note)  -CC therapy note (daily note)  -CK    Subjective Information agree to therapy;complains of;dyspnea  -RM agree to therapy  -CC agree to therapy  -CK    Patient Effort, Rehab  Treatment adequate  -RM good  -CC good  -CK    Symptoms Noted During/After Treatment shortness of breath  -RM shortness of breath  -CC shortness of breath  -CK    Symptoms Noted Comment O2 SATS >96% during therapy  -RM O2 SATS >96% during therapy  -CC     Precautions/Limitations fall precautions;oxygen therapy device and L/min  -RM fall precautions;oxygen therapy device and L/min  -CC fall precautions;oxygen therapy device and L/min  -CK    Specific Treatment Considerations 25 lpm at 35%  -RM      Recorded by [RM] Zenon Rothman PTA [CC] Rebeca Sawyer PTA [CK] Jessie Davenport PTA    Vital Signs    O2 Delivery Pre Treatment   supplemental O2  -CK    O2 Delivery Intra Treatment   supplemental O2  -CK    O2 Delivery Post Treatment   supplemental O2  -CK    Recorded by   [CK] Jessie Davenport PTA    Pain Assessment    Pain Assessment No/denies pain  -RM 0-10  -CC 0-10  -CK    Pain Score  6  -CC 6  -CK    Post Pain Score  6  -CC 6  -CK    Pain Type  Acute pain  -CC Acute pain  -CK    Pain Location  Hip  -CC Hip  -CK    Pain Orientation  Left  -CC Left  -CK    Pain Intervention(s)  Repositioned  -CC Repositioned  -CK    Response to Interventions   --   tolerated  -CK    Recorded by [RM] Zenon Rothman PTA [CC] Rebeca Sawyer PTA [CK] Jessie Davenport PTA    Vision Assessment/Intervention    Visual Impairment  WFL with corrective lenses  -CC     Recorded by  [CC] Rebeca Sawyer PTA     Bed Mobility, Assessment/Treatment    Bed Mobility, Assistive Device head of bed elevated;bed rails  -RM  bed rails;head of bed elevated  -CK    Bed Mob, Supine to Sit, [place] supervision required  -RM  supervision required  -CK    Bed Mobility, Impairments strength decreased  -RM      Recorded by [RM] Zenon Rothman PTA  [CK] Jessie Davenport PTA    Transfer Assessment/Treatment    Transfers, Bed-Chair [place]   minimum assist (75% patient effort);contact guard assist  -CK    Transfers, Sit-Stand [place]  contact guard assist  -RM  contact guard assist  -CK    Transfers, Stand-Sit [place] contact guard assist  -RM  contact guard assist  -CK    Transfers, Sit-Stand-Sit, Assist Device rolling walker  -RM  rolling walker  -CK    Transfer, Safety Issues balance decreased during turns;step length decreased  -RM      Transfer, Impairments strength decreased;impaired balance  -RM      Recorded by [RM] Zenon Rothman PTA  [CK] Jessie Davenport PTA    Gait Assessment/Treatment    Gait, [place] Level contact guard assist  -RM  minimum assist (75% patient effort)  -CK    Gait, Assistive Device rolling walker  -RM      Gait, Distance (Feet) 24  -RM  3  -CK    Gait, Gait Pattern Analysis swing-through gait  -RM  swing-to gait  -CK    Gait, Gait Deviations decreased heel strike;forward flexed posture;step length decreased;stride width increased;toe-to-floor clearance decreased  -RM  double stance time increased;antalgic;left:;step length decreased;weight-shifting ability decreased  -CK    Gait, Safety Issues step length decreased  -RM      Gait, Impairments strength decreased;impaired balance  -RM      Gait, Comment   difficulty advancing L foot d/t pain in hip  -CK    Recorded by [RM] Zenon Rothman PTA  [CK] Jessie Davenport PTA    Therapy Exercises    Bilateral Lower Extremities --   B LE restorator 2' fwd and 2' bkwd.   -RM AROM:;15 reps;supine;ankle pumps/circles;glut sets;heel slides;hip abduction/adduction;quad sets;SAQ  -CC 15 reps;AROM:;sitting;ankle pumps/circles;hip abduction/adduction;hip flexion;LAQ  -CK    Recorded by [RM] Zenon Rothman PTA [CC] Rebeca Sawyer PTA [CK] Jessie Davenport PTA    Positioning and Restraints    Pre-Treatment Position in bed  -RM in bed  -CC in bed  -CK    Post Treatment Position chair  -RM bed  -CC chair  -CK    In Bed  fowlers;encouraged to call for assist  -CC sitting;encouraged to call for assist  -CK    In Chair reclined;call light within reach;encouraged to call  for assist;exit alarm on;notified nsg  -RM      Recorded by [RM] Zenon Rothman, PTA [CC] Rebeca Sawyer, PTA [CK] Jessie Davenport, PTA      User Key  (r) = Recorded By, (t) = Taken By, (c) = Cosigned By    Initials Name Effective Dates    CC Rebeca Sawyer, PTA 10/26/16 -     CK Jessie Davenport, PTA 10/26/16 -     RM Zenon Rothman, PTA 10/26/16 -                 IP PT Goals       01/08/18 1735 01/07/18 1551 01/06/18 1441    Bed Mobility PT LTG    Bed Mobility PT LTG, Outcome goal partially met  -RM goal partially met  -CC goal partially met  -CK    Transfer Training PT LTG    Transfer Training PT LTG, Outcome goal ongoing  -RM goal ongoing  -CC goal ongoing  -CK    Gait Training PT LTG    Gait Training Goal PT LTG, Outcome goal ongoing  -RM goal ongoing  -CC goal ongoing  -CK      01/05/18 1747 01/04/18 1532 01/03/18 1329    Bed Mobility PT LTG    Bed Mobility PT LTG, Outcome goal partially met  -CK goal partially met  -LM goal ongoing  -LM    Transfer Training PT LTG    Transfer Training PT LTG, Outcome goal ongoing  -CK goal partially met  -LM goal ongoing  -LM    Gait Training PT LTG    Gait Training Goal PT LTG, Outcome goal ongoing  -CK goal ongoing  -LM goal ongoing  -LM      01/02/18 1551 12/31/17 1532 12/28/17 1711    Bed Mobility PT LTG    Bed Mobility PT LTG, Date Established   12/28/17  -JR    Bed Mobility PT LTG, Time to Achieve   2 wks  -JR    Bed Mobility PT LTG, Activity Type   all bed mobility  -JR    Bed Mobility PT LTG, [place] Level   minimum assist (75% patient effort)  -JR    Bed Mobility PT LTG, Outcome goal ongoing  -RM goal ongoing  -JR     Transfer Training PT LTG    Transfer Training PT LTG, Date Established   12/28/17  -JR    Transfer Training PT LTG, Time to Achieve   2 wks  -JR    Transfer Training PT LTG, Activity Type   sit to stand/stand to sit;bed to chair /chair to bed  -JR    Transfer Training PT LTG, [place] Level   minimum assist (75% patient effort)  -JR     Transfer Training PT LTG, Assist Device   walker, rolling  -JR    Transfer Training PT LTG, Outcome goal ongoing  -RM goal ongoing  -JR     Gait Training PT LTG    Gait Training Goal PT LTG, Date Established   12/28/17  -JR    Gait Training Goal PT LTG, Time to Achieve   2 wks  -JR    Gait Training Goal PT LTG, [place] Level   minimum assist (75% patient effort)  -JR    Gait Training Goal PT LTG, Assist Device   walker, rolling  -JR    Gait Training Goal PT LTG, Distance to Achieve   25  -JR    Gait Training Goal PT LTG, Outcome  goal ongoing  -JR       User Key  (r) = Recorded By, (t) = Taken By, (c) = Cosigned By    Initials Name Provider Type    JR Kaylee Kurtz, PT Physical Therapist    LM Xochitl Hernandez, PT Physical Therapist    CC Rebeca Sawyer, PTA Physical Therapy Assistant    CK Jessie Davenport, PTA Physical Therapy Assistant    RM Zenon Rothman, PTA Physical Therapy Assistant          Physical Therapy Education     Title: PT OT SLP Therapies (Active)     Topic: Physical Therapy (Done)     Point: Mobility training (Done)    Learning Progress Summary    Learner Readiness Method Response Comment Documented by Status   Patient Acceptance E,D VU,NR Pursed lip breathing with activity.  01/08/18 1735 Done    Acceptance E VU  CK 01/06/18 1441 Done    Acceptance E VU   01/05/18 1747 Done    Acceptance E VU Ther ex for HEP; safety techniques for transfers.  01/04/18 1532 Done    Acceptance E VU Purpose of PT; PLB/pacing to alleviate SOA with activity.  01/03/18 1328 Done    Acceptance E,D VU,NR   01/02/18 1550 Done    Acceptance E NR   01/02/18 0334 Active    Acceptance E VU Importance of mobility to recovery  12/28/17 1706 Done   Family Acceptance E VU Purpose of PT; PLB/pacing to alleviate SOA with activity.  01/03/18 1328 Done               Point: Home exercise program (Done)    Learning Progress Summary    Learner Readiness Method Response Comment Documented by Status   Patient Acceptance  E,D VU,NR Pursed lip breathing with activity.  01/08/18 1735 Done    Acceptance E VU   01/06/18 1441 Done    Acceptance E VU  CK 01/05/18 1747 Done    Acceptance E VU Ther ex for HEP; safety techniques for transfers.  01/04/18 1532 Done    Acceptance E VU Purpose of PT; PLB/pacing to alleviate SOA with activity. LM 01/03/18 1328 Done    Acceptance E,D VU,NR  RM 01/02/18 1550 Done    Acceptance E NR   01/02/18 0334 Active    Acceptance E VU instructed on the importance of exercises to improving mobility  12/31/17 1523 Done   Family Acceptance E VU Purpose of PT; PLB/pacing to alleviate SOA with activity.  01/03/18 1328 Done    Acceptance E VU instructed on the importance of exercises to improving mobility  12/31/17 1523 Done               Point: Body mechanics (Done)    Learning Progress Summary    Learner Readiness Method Response Comment Documented by Status   Patient Acceptance E VU  CK 01/06/18 1441 Done               Point: Precautions (Done)    Learning Progress Summary    Learner Readiness Method Response Comment Documented by Status   Patient Acceptance E VU  CK 01/06/18 1441 Done    Acceptance E VU  CK 01/05/18 1747 Done    Acceptance E VU Ther ex for HEP; safety techniques for transfers.  01/04/18 1532 Done    Acceptance E VU Purpose of PT; PLB/pacing to alleviate SOA with activity.  01/03/18 1328 Done   Family Acceptance E VU Purpose of PT; PLB/pacing to alleviate SOA with activity.  01/03/18 1328 Done                      User Key     Initials Effective Dates Name Provider Type Discipline     10/26/16 -  Kaylee Kurtz, PT Physical Therapist PT     10/26/16 -  Xochitl Hernandez, PT Physical Therapist PT     10/26/16 -  Jessie Davenport PTA Physical Therapy Assistant PT     10/26/16 -  Zenon Rothman PTA Physical Therapy Assistant PT     08/31/17 -  Mayda Nguyen, RN Registered Nurse Nurse                    PT Recommendation and Plan  Anticipated Discharge Disposition:  inpatient rehabilitation facility  Planned Therapy Interventions: balance training, strengthening, bed mobility training, transfer training, gait training, patient/family education  PT Frequency: daily  Plan of Care Review  Plan Of Care Reviewed With: patient  Progress: improving  Outcome Summary/Follow up Plan: Pt presented with improved activity tolerance this treatment evident by increased ambulation distance as well as decreased assistance.           Outcome Measures       01/08/18 1623 01/06/18 0133       How much help from another person do you currently need...    Turning from your back to your side while in flat bed without using bedrails?  3  -CK     Moving from lying on back to sitting on the side of a flat bed without bedrails?  3  -CK     Moving to and from a bed to a chair (including a wheelchair)?  3  -CK     Standing up from a chair using your arms (e.g., wheelchair, bedside chair)?  3  -CK     Climbing 3-5 steps with a railing?  1  -CK     To walk in hospital room?  2  -CK     AM-PAC 6 Clicks Score  15  -CK     How much help from another is currently needed...    Putting on and taking off regular lower body clothing? 3  -SD      Bathing (including washing, rinsing, and drying) 3  -SD      Toileting (which includes using toilet bed pan or urinal) 3  -SD      Putting on and taking off regular upper body clothing 3  -SD      Taking care of personal grooming (such as brushing teeth) 3  -SD      Eating meals 4  -SD      Score 19  -SD      Functional Assessment    Outcome Measure Options AM-PAC 6 Clicks Daily Activity (OT)  -SD AM-PAC 6 Clicks Basic Mobility (PT)  -CK       User Key  (r) = Recorded By, (t) = Taken By, (c) = Cosigned By    Initials Name Provider Type    CARMELO Davenport PTA Physical Therapy Assistant    TONY Edwards OT Occupational Therapist           Time Calculation:         PT Charges       01/08/18 3773          Time Calculation    Start Time 1411  -RM      PT Received On  01/08/18  -RM      PT Goal Re-Cert Due Date 01/09/18  -RM      Time Calculation- PT    Total Timed Code Minutes- PT 25 minute(s)  -RM        User Key  (r) = Recorded By, (t) = Taken By, (c) = Cosigned By    Initials Name Provider Type    KRISTEN Rothman PTA Physical Therapy Assistant          Therapy Charges for Today     Code Description Service Date Service Provider Modifiers Qty    83721475909 HC GAIT TRAINING EA 15 MIN 1/8/2018 Zenon Rothman, HELADIO GP 1    78007007550 HC PT THER PROC EA 15 MIN 1/8/2018 Zenon Rothman, HELADIO GP 1          PT G-Codes  Outcome Measure Options: AM-PAC 6 Clicks Daily Activity (OT)    Zenon Rothman PTA  1/8/2018

## 2018-01-08 NOTE — PLAN OF CARE
Problem: Patient Care Overview (Adult)  Goal: Plan of Care Review  Outcome: Ongoing (interventions implemented as appropriate)   01/08/18 1735   Coping/Psychosocial Response Interventions   Plan Of Care Reviewed With patient   Patient Care Overview   Progress improving   Outcome Evaluation   Outcome Summary/Follow up Plan Pt presented with improved activity tolerance this treatment evident by increased ambulation distance as well as decreased assistance.        Problem: Inpatient Physical Therapy  Goal: Bed Mobility Goal LTG- PT  Outcome: Ongoing (interventions implemented as appropriate)   12/28/17 1711 01/08/18 1735   Bed Mobility PT LTG   Bed Mobility PT LTG, Date Established 12/28/17 --    Bed Mobility PT LTG, Time to Achieve 2 wks --    Bed Mobility PT LTG, Activity Type all bed mobility --    Bed Mobility PT LTG, [place] Level minimum assist (75% patient effort) --    Bed Mobility PT LTG, Outcome --  goal partially met     Goal: Transfer Training Goal 1 LTG- PT  Outcome: Ongoing (interventions implemented as appropriate)   12/28/17 1711 01/08/18 1735   Transfer Training PT LTG   Transfer Training PT LTG, Date Established 12/28/17 --    Transfer Training PT LTG, Time to Achieve 2 wks --    Transfer Training PT LTG, Activity Type sit to stand/stand to sit;bed to chair /chair to bed --    Transfer Training PT LTG, [place] Level minimum assist (75% patient effort) --    Transfer Training PT LTG, Assist Device walker, rolling --    Transfer Training PT LTG, Outcome --  goal ongoing     Goal: Gait Training Goal LTG- PT  Outcome: Ongoing (interventions implemented as appropriate)   12/28/17 1711 01/08/18 1735   Gait Training PT LTG   Gait Training Goal PT LTG, Date Established 12/28/17 --    Gait Training Goal PT LTG, Time to Achieve 2 wks --    Gait Training Goal PT LTG, [place] Level minimum assist (75% patient effort) --    Gait Training Goal PT LTG, Assist Device walker, rolling --    Gait Training  Goal PT LTG, Distance to Achieve 25 --    Gait Training Goal PT LTG, Outcome --  goal ongoing

## 2018-01-08 NOTE — PLAN OF CARE
Problem: Patient Care Overview (Adult)  Goal: Plan of Care Review  Outcome: Ongoing (interventions implemented as appropriate)   01/08/18 1707   Coping/Psychosocial Response Interventions   Plan Of Care Reviewed With patient   Patient Care Overview   Progress improving   Outcome Evaluation   Outcome Summary/Follow up Plan OT re-eval completed this date. Patient has progressed to CGA with functional transfers and functional mobility, 9' at bedside d/t requiring high flow O2. Patient able to walk forward and backward as well as sidestep, maintaining good O2 saturation. Patient continues to require Min A for LBD and LBB tasks d/t left hip pain. Patient is expected to continue to benefit from OT services to improve functional performance and mobility prior to DC.        Problem: Inpatient Occupational Therapy  Goal: Bed Mobility Goal LTG- OT  Outcome: Revised   01/08/18 1707   Bed Mobility OT LTG   Bed Mobility OT LTG, Date Established 01/08/18   Bed Mobility OT LTG, Time to Achieve 2 wks   Bed Mobility OT LTG, Activity Type all bed mobility   Bed Mobility OT LTG, [place] Level conditional independence   Bed Mobility OT LTG, Outcome goal ongoing   Bed Mobility OT LTG, Reason Goal Not Met goals revised this date     Goal: Transfer Training Goal 1 LTG- OT  Outcome: Revised   01/08/18 1707   Transfer Training OT LTG   Transfer Training OT LTG, Date Established 01/08/18   Transfer Training OT LTG, Time to Achieve 2 wks   Transfer Training OT LTG, Activity Type sit to stand/stand to sit;toilet   Transfer Training OT LTG, [place] Level supervision required   Transfer Training OT LTG, Assist Device walker, rolling   Transfer Training OT LTG, Outcome goal ongoing   Transfer Training OT LTG, Reason Goal Not Met goals revised this date     Goal: Strength Goal LTG- OT  Outcome: Revised   01/08/18 1707   Strength OT LTG   Strength Goal OT LTG, Date Established 01/08/18   Strength Goal OT LTG, Time to Achieve 2 wks    Strength Goal OT LTG, Measure to Achieve Patient will perform 25 reps UB graded ther ex maintaining O2 >90 in order to increase strength and endurance.    Strength Goal OT LTG, Outcome goal ongoing   Strength Goal OT LTG, Reason Goal Not Met goals revised this date     Goal: LB Dressing Goal LTG- OT  Outcome: Revised   01/08/18 1707   LB Dressing OT LTG   LB Dressing Goal OT LTG, Date Established 01/08/18   LB Dressing Goal OT LTG, Time to Achieve 2 wks   LB Dressing Goal OT LTG, [place] Level contact guard assist   LB Dressing Goal OT LTG, Outcome goal ongoing   LB Dressing Goal OT LTG, Reason Goal Not Met goals revised this date     Goal: UB Dressing Goal LTG- OT  Outcome: Revised   01/08/18 1707   UB Dressing OT LTG   UB Dressing Goal OT LTG, Date Established 01/08/18   UB Dressing Goal OT LTG, Time to Achieve 2 wks   UB Dressing Goal OT LTG, [place] Level set up required   UB Dressing Goal OT LTG, Outcome goal ongoing

## 2018-01-09 LAB
ALBUMIN SERPL-MCNC: 2.9 G/DL (ref 3.5–5)
ANION GAP SERPL CALCULATED.3IONS-SCNC: 7.1 MMOL/L
BUN BLD-MCNC: 25 MG/DL (ref 7–20)
BUN/CREAT SERPL: 27.8 (ref 6.3–21.9)
CALCIUM SPEC-SCNC: 8.7 MG/DL (ref 8.4–10.2)
CHLORIDE SERPL-SCNC: 105 MMOL/L (ref 98–107)
CO2 SERPL-SCNC: 34 MMOL/L (ref 26–30)
CREAT BLD-MCNC: 0.9 MG/DL (ref 0.6–1.3)
DEPRECATED RDW RBC AUTO: 42.8 FL (ref 37–54)
ERYTHROCYTE [DISTWIDTH] IN BLOOD BY AUTOMATED COUNT: 12.4 % (ref 11.5–14.5)
GFR SERPL CREATININE-BSD FRML MDRD: 84 ML/MIN/1.73
GLUCOSE BLD-MCNC: 111 MG/DL (ref 74–98)
HCT VFR BLD AUTO: 33.1 % (ref 42–52)
HGB BLD-MCNC: 10.6 G/DL (ref 14–18)
MAGNESIUM SERPL-MCNC: 2 MG/DL (ref 1.6–2.3)
MCH RBC QN AUTO: 30 PG (ref 27–31)
MCHC RBC AUTO-ENTMCNC: 32 G/DL (ref 30–37)
MCV RBC AUTO: 93.8 FL (ref 80–94)
PHOSPHATE SERPL-MCNC: 2.5 MG/DL (ref 2.5–4.5)
PLATELET # BLD AUTO: 192 10*3/MM3 (ref 130–400)
PMV BLD AUTO: 9.1 FL (ref 6–12)
POTASSIUM BLD-SCNC: 4.1 MMOL/L (ref 3.5–5.1)
RBC # BLD AUTO: 3.53 10*6/MM3 (ref 4.7–6.1)
SODIUM BLD-SCNC: 142 MMOL/L (ref 137–145)
WBC NRBC COR # BLD: 12.72 10*3/MM3 (ref 4.8–10.8)

## 2018-01-09 PROCEDURE — 25010000002 METHYLPREDNISOLONE PER 40 MG: Performed by: INTERNAL MEDICINE

## 2018-01-09 PROCEDURE — 83735 ASSAY OF MAGNESIUM: CPT | Performed by: INTERNAL MEDICINE

## 2018-01-09 PROCEDURE — 85027 COMPLETE CBC AUTOMATED: CPT | Performed by: INTERNAL MEDICINE

## 2018-01-09 PROCEDURE — 94799 UNLISTED PULMONARY SVC/PX: CPT

## 2018-01-09 PROCEDURE — 97164 PT RE-EVAL EST PLAN CARE: CPT

## 2018-01-09 PROCEDURE — 97110 THERAPEUTIC EXERCISES: CPT

## 2018-01-09 PROCEDURE — 25010000002 LORAZEPAM PER 2 MG: Performed by: INTERNAL MEDICINE

## 2018-01-09 PROCEDURE — 80069 RENAL FUNCTION PANEL: CPT | Performed by: INTERNAL MEDICINE

## 2018-01-09 PROCEDURE — 99232 SBSQ HOSP IP/OBS MODERATE 35: CPT | Performed by: INTERNAL MEDICINE

## 2018-01-09 PROCEDURE — 94660 CPAP INITIATION&MGMT: CPT

## 2018-01-09 PROCEDURE — 25010000002 ENOXAPARIN PER 10 MG: Performed by: INTERNAL MEDICINE

## 2018-01-09 RX ADMIN — AMLODIPINE BESYLATE 5 MG: 5 TABLET ORAL at 08:17

## 2018-01-09 RX ADMIN — OXYBUTYNIN CHLORIDE 5 MG: 5 TABLET ORAL at 06:13

## 2018-01-09 RX ADMIN — DOCUSATE SODIUM AND SENNOSIDES 2 TABLET: 8.6; 5 TABLET, FILM COATED ORAL at 20:22

## 2018-01-09 RX ADMIN — NYSTATIN 500000 UNITS: 100000 SUSPENSION ORAL at 08:15

## 2018-01-09 RX ADMIN — TRAZODONE HYDROCHLORIDE 50 MG: 50 TABLET ORAL at 20:22

## 2018-01-09 RX ADMIN — GUAIFENESIN AND DEXTROMETHORPHAN HYDROBROMIDE 2 TABLET: 600; 30 TABLET, EXTENDED RELEASE ORAL at 08:16

## 2018-01-09 RX ADMIN — TAMSULOSIN HYDROCHLORIDE 0.4 MG: 0.4 CAPSULE ORAL at 20:22

## 2018-01-09 RX ADMIN — PANTOPRAZOLE SODIUM 40 MG: 40 TABLET, DELAYED RELEASE ORAL at 17:00

## 2018-01-09 RX ADMIN — CARVEDILOL 3.12 MG: 3.12 TABLET, FILM COATED ORAL at 20:22

## 2018-01-09 RX ADMIN — ATORVASTATIN CALCIUM 40 MG: 40 TABLET, FILM COATED ORAL at 08:18

## 2018-01-09 RX ADMIN — HYDROCODONE BITARTRATE AND ACETAMINOPHEN 1 TABLET: 7.5; 325 TABLET ORAL at 03:42

## 2018-01-09 RX ADMIN — CARVEDILOL 3.12 MG: 3.12 TABLET, FILM COATED ORAL at 08:16

## 2018-01-09 RX ADMIN — BUDESONIDE 0.5 MG: 0.5 INHALANT RESPIRATORY (INHALATION) at 07:25

## 2018-01-09 RX ADMIN — NYSTATIN 500000 UNITS: 100000 SUSPENSION ORAL at 13:00

## 2018-01-09 RX ADMIN — IPRATROPIUM BROMIDE AND ALBUTEROL SULFATE 3 ML: .5; 3 SOLUTION RESPIRATORY (INHALATION) at 07:25

## 2018-01-09 RX ADMIN — Medication 1 EACH: at 21:00

## 2018-01-09 RX ADMIN — DULOXETINE HYDROCHLORIDE 30 MG: 30 CAPSULE, DELAYED RELEASE ORAL at 08:17

## 2018-01-09 RX ADMIN — NYSTATIN 500000 UNITS: 100000 SUSPENSION ORAL at 20:22

## 2018-01-09 RX ADMIN — MIRTAZAPINE 15 MG: 15 TABLET, FILM COATED ORAL at 20:22

## 2018-01-09 RX ADMIN — METAXALONE 400 MG: 800 TABLET ORAL at 17:00

## 2018-01-09 RX ADMIN — BENZONATATE 200 MG: 100 CAPSULE ORAL at 08:14

## 2018-01-09 RX ADMIN — HYDROCODONE POLISTIREX AND CHLORPHENIRAMINE POLISTIREX 5 ML: 10; 8 SUSPENSION, EXTENDED RELEASE ORAL at 08:21

## 2018-01-09 RX ADMIN — LORAZEPAM 0.5 MG: 2 INJECTION INTRAMUSCULAR; INTRAVENOUS at 03:42

## 2018-01-09 RX ADMIN — METAXALONE 400 MG: 800 TABLET ORAL at 20:21

## 2018-01-09 RX ADMIN — BUDESONIDE 0.5 MG: 0.5 INHALANT RESPIRATORY (INHALATION) at 19:27

## 2018-01-09 RX ADMIN — IPRATROPIUM BROMIDE AND ALBUTEROL SULFATE 3 ML: .5; 3 SOLUTION RESPIRATORY (INHALATION) at 12:39

## 2018-01-09 RX ADMIN — ENOXAPARIN SODIUM 40 MG: 40 INJECTION SUBCUTANEOUS at 21:00

## 2018-01-09 RX ADMIN — IPRATROPIUM BROMIDE AND ALBUTEROL SULFATE 3 ML: .5; 3 SOLUTION RESPIRATORY (INHALATION) at 19:27

## 2018-01-09 RX ADMIN — LOSARTAN POTASSIUM 50 MG: 50 TABLET, FILM COATED ORAL at 08:16

## 2018-01-09 RX ADMIN — PANTOPRAZOLE SODIUM 40 MG: 40 TABLET, DELAYED RELEASE ORAL at 06:13

## 2018-01-09 RX ADMIN — Medication 1 EACH: at 09:00

## 2018-01-09 RX ADMIN — POLYETHYLENE GLYCOL 3350 17 G: 17 POWDER, FOR SOLUTION ORAL at 08:15

## 2018-01-09 RX ADMIN — METAXALONE 400 MG: 800 TABLET ORAL at 08:16

## 2018-01-09 RX ADMIN — NYSTATIN 500000 UNITS: 100000 SUSPENSION ORAL at 17:25

## 2018-01-09 RX ADMIN — Medication 10 ML: at 08:17

## 2018-01-09 RX ADMIN — FINASTERIDE 5 MG: 5 TABLET, FILM COATED ORAL at 08:17

## 2018-01-09 RX ADMIN — METHYLPREDNISOLONE SODIUM SUCCINATE 40 MG: 40 INJECTION, POWDER, FOR SOLUTION INTRAMUSCULAR; INTRAVENOUS at 03:43

## 2018-01-09 RX ADMIN — MEGESTROL ACETATE 800 MG: 40 SUSPENSION ORAL at 08:15

## 2018-01-09 RX ADMIN — FLUTICASONE PROPIONATE 1 SPRAY: 50 SPRAY, METERED NASAL at 09:00

## 2018-01-09 RX ADMIN — CLOPIDOGREL BISULFATE 75 MG: 75 TABLET ORAL at 08:16

## 2018-01-09 RX ADMIN — GUAIFENESIN AND DEXTROMETHORPHAN HYDROBROMIDE 2 TABLET: 600; 30 TABLET, EXTENDED RELEASE ORAL at 21:00

## 2018-01-09 RX ADMIN — BISACODYL 10 MG: 10 SUPPOSITORY RECTAL at 08:16

## 2018-01-09 NOTE — PLAN OF CARE
Problem: Patient Care Overview (Adult)  Goal: Plan of Care Review  Outcome: Ongoing (interventions implemented as appropriate)   01/09/18 1845   Coping/Psychosocial Response Interventions   Plan Of Care Reviewed With patient   Patient Care Overview   Progress improving   Outcome Evaluation   Outcome Summary/Follow up Plan Patient is able to demonstrate improvements in strength, transfers, balance and activity tolerance. He is expected to continue progressing with additional PT services.       Problem: Inpatient Physical Therapy  Goal: Bed Mobility Goal LTG- PT  Outcome: Ongoing (interventions implemented as appropriate)   01/09/18 1845   Bed Mobility PT LTG   Bed Mobility PT LTG, Date Established 01/09/18   Bed Mobility PT LTG, Time to Achieve 2 wks   Bed Mobility PT LTG, Activity Type all bed mobility   Bed Mobility PT LTG, [place] Level conditional independence   Bed Mobility PT LTG, Additional Goal with oxygen saturation levels at or above 90%   Bed Mobility PT LTG, Outcome goal revised     Goal: Transfer Training Goal 1 LTG- PT  Outcome: Revised   01/09/18 1845   Transfer Training PT LTG   Transfer Training PT LTG, Date Established 01/09/18   Transfer Training PT LTG, Time to Achieve 2 wks   Transfer Training PT LTG, Activity Type sit to stand/stand to sit;bed to chair /chair to bed   Transfer Training PT LTG, [place] Level supervision required   Transfer Training PT LTG, Assist Device walker, rolling   Transfer Training PT LTG, Additional Goal with oxygen saturation levels at or above 90%   Transfer Training PT LTG, Outcome goal revised     Goal: Gait Training Goal LTG- PT  Outcome: Revised   01/09/18 1845   Gait Training PT LTG   Gait Training Goal PT LTG, Date Established 01/09/18   Gait Training Goal PT LTG, Time to Achieve 2 wks   Gait Training Goal PT LTG, [place] Level contact guard assist   Gait Training Goal PT LTG, Assist Device walker, rolling   Gait Training Goal PT LTG, Distance to  Achieve 100 feet    Gait Training Goal PT LTG, Additional Goal with oxygen saturation level of at least 90%   Gait Training Goal PT LTG, Outcome goal revised

## 2018-01-09 NOTE — PLAN OF CARE
Problem: Patient Care Overview (Adult)  Goal: Plan of Care Review  Outcome: Ongoing (interventions implemented as appropriate)   01/09/18 1546   Coping/Psychosocial Response Interventions   Plan Of Care Reviewed With patient   Patient Care Overview   Progress improving   Outcome Evaluation   Outcome Summary/Follow up Plan OT tx completed. Patient on BiPap upon therapist entering room; removed BiPap and switched over to mask on 13L O2; became anxious, stating he couldn't feel the flow of oxygen coming through. Respiratory therapist assured patient he was getting O2 and the flow was going to feel different than high flow he had been receiving. Respiratory did not feel patient was appropriate to walk d/t increased anxiety in transitioning from high flow to mask. Patient completed UB ther ex EOB. Continue OT POC.        Problem: Inpatient Occupational Therapy  Goal: Bed Mobility Goal LTG- OT  Outcome: Outcome(s) achieved Date Met: 01/09/18 01/08/18 1707 01/09/18 1546   Bed Mobility OT LTG   Bed Mobility OT LTG, Date Established 01/08/18 --    Bed Mobility OT LTG, Time to Achieve 2 wks --    Bed Mobility OT LTG, Activity Type all bed mobility --    Bed Mobility OT LTG, [place] Level conditional independence --    Bed Mobility OT LTG, Date Goal Reviewed --  01/09/18   Bed Mobility OT LTG, Outcome --  goal met   Bed Mobility OT LTG, Reason Goal Not Met goals revised this date --      Goal: Transfer Training Goal 1 LTG- OT  Outcome: Ongoing (interventions implemented as appropriate)   01/08/18 1707 01/09/18 1546   Transfer Training OT LTG   Transfer Training OT LTG, Date Established 01/08/18 --    Transfer Training OT LTG, Time to Achieve 2 wks --    Transfer Training OT LTG, Activity Type sit to stand/stand to sit;toilet --    Transfer Training OT LTG, [place] Level supervision required --    Transfer Training OT LTG, Assist Device walker, rolling --    Transfer Training OT LTG, Date Goal Reviewed --  01/09/18    Transfer Training OT LTG, Outcome --  goal ongoing   Transfer Training OT LTG, Reason Goal Not Met goals revised this date --      Goal: Strength Goal LTG- OT  Outcome: Ongoing (interventions implemented as appropriate)   12/28/17 1404 01/08/18 1707 01/09/18 1546   Strength OT LTG   Strength Goal OT LTG, Date Established --  01/08/18 --    Strength Goal OT LTG, Time to Achieve --  2 wks --    Strength Goal OT LTG, Measure to Achieve --  Patient will perform 25 reps UB graded ther ex maintaining O2 >90 in order to increase strength and endurance.  --    Strength Goal OT LTG, Functional Goal Pt will perform 15 reps UE ex progressing from AROM to strengthening as pt tolerates --  --    Strength Goal OT LTG, Date Goal Reviewed --  --  01/09/18   Strength Goal OT LTG, Outcome --  --  goal ongoing   Strength Goal OT LTG, Reason Goal Not Met --  goals revised this date --      Goal: LB Dressing Goal LTG- OT  Outcome: Ongoing (interventions implemented as appropriate)   01/08/18 1707 01/09/18 1546   LB Dressing OT LTG   LB Dressing Goal OT LTG, Date Established 01/08/18 --    LB Dressing Goal OT LTG, Time to Achieve 2 wks --    LB Dressing Goal OT LTG, [place] Level contact guard assist --    LB Dressing Goal OT LTG, Date Goal Reviewed --  01/09/18   LB Dressing Goal OT LTG, Outcome --  goal ongoing   LB Dressing Goal OT LTG, Reason Goal Not Met goals revised this date --      Goal: UB Dressing Goal LTG- OT   01/08/18 1707 01/09/18 1546   UB Dressing OT LTG   UB Dressing Goal OT LTG, Date Established 01/08/18 --    UB Dressing Goal OT LTG, Time to Achieve 2 wks --    UB Dressing Goal OT LTG, [place] Level set up required --    UB Dressing Goal OT LTG, Date Goal Reviewed --  01/09/18   UB Dressing Goal OT LTG, Outcome --  goal ongoing

## 2018-01-09 NOTE — PROGRESS NOTES
LOS: 15 days   Patient Care Team:  Miguel Rojas MD as PCP - General  Miguel Rojas MD as PCP - Family Medicine   Primary pulmonologist: Kevin Lopez M.D.    Chief Complaint: Acute on chronic respiratory failure with hypoxemia gradually improving but still requires 30% FiO2 with 20 L/m which is quite a significant improvement on this patient over last few days.  Cough with small amounts of mucopurulent sputum which is able to expectorate so VQ mismatch is improving TOO.    Subjective     Shortness of Breath         Subjective:  Symptoms:  He reports shortness of breath.    History last significant shortness of air with minimal exertion but he has been able to perform better with physical therapy and occupational therapy.  He is able to stand with minimal support but still feels weak in his legs.  He was able to do floor bicycle ergometry for development of strength and endurance too.  No documented fevers vital signs stable O2 saturation 92% at rest sitting semiupright in bed with high flow oxygen.  Patient is awaiting a bed either at local rehabilitation facility for PT and OT but also respiratory therapy techniques to improve rations dyspnea in secretion clearance.  He may also require LTAC however his oxygen requirements is already in improving.    History taken from: patient chart RN    Objective     Vital Signs  Temp:  [97.5 °F (36.4 °C)-98.6 °F (37 °C)] 97.5 °F (36.4 °C)  Heart Rate:  [] 94  Resp:  [18-23] 20  BP: (104-149)/(49-77) 113/59    Objective  Patient is alert oriented ×3.  Cranial nerves and speech normal.  No tremors.  His peripheral edema is markedly improved including lower extremity edema.  Oral cavity with some's sores in the mouth.  Neck veins flat in carotid upstrokes normal without bruits.  Thoracic exam: Distant but clear bilateral breath sounds few scattered rhonchi.  Cardiovascular system exam: Regular rhythm distant heart sounds no gallops appreciated.  Abdominal exam: Soft  nontender nondistended abdomen no organomegaly nor any evidence of ascites.  Results Review:     I reviewed the patient's new clinical results.  I reviewed the patient's other test results and agree with the interpretation    Medication Review: Medication review is done.  Patient is on hydrocodone with acetaminophen for chronic back pains.  Chronic constipation being managed with the laxative.  Cymbalta is also helping with his pain.  His cough has improved with Mucinex DM as well as with antibiotic regimen.  His blood pressure is under good control with current drug regimen.  Vigorous bronchodilator nebulization therapy has helped 2.  Runny nose with patent nostrils has improved.  Patient is also on DVT prophylaxis.  Anxiety being treated with alprazolam.  She is also on GI prophylaxis.    Assessment/Plan     Active Problems:    Acute exacerbation of chronic obstructive pulmonary disease (COPD)  Acute by basal or bronchopneumonia with respiratory failure due to COPD exacerbation with very gradual improvement related to improvement in VQ mismatch.  Patient still continues to require high flow oxygen.  EOP with advanced lung disease with recurrent exacerbations leading to hospitalizations and even rehabilitation at least 2-3 times a year which also has led to progressive functional decline.  He however had been ambulatory and was able to drive to my office just a few days prior to this hospitalization.  Plan and suggest:  1.  Change current high flow oxygen to eventually mass tomorrow which would be 15 L flow and FiO2 of 0.35.  Patient will intermittently use nasal cannula at 6-7 L for her eating which will allow us to evaluate his oxygen needs is decreasing.  2.  Nystatin swish and swallow for oral thrush.  3.  Patient is definitely making a progress with physical therapy and occupational therapy trying to plan for inpatient rehabilitation out of hospital.  4.  I'm going to make arrangements for his outpatient  rehabilitation within the first 2 weeks of hospital discharge to come for outpatient rehabilitation at our office.  I will also continue to monitor him on a weekly basis to make sure that he continues to show progress and stays away from smoking continues to use his BiPAP.  5.  I'm also trying to get a mask for him which he reduces the pressure on his nose.  Typical mass that can be used would be AMARAVIEW full facemask or [place]well combo mask with nasal pillows and facemask.      Assessment & Plan    Kevin Lopez MD  01/08/18  8:11 PM

## 2018-01-09 NOTE — PLAN OF CARE
Problem: Patient Care Overview (Adult)  Goal: Plan of Care Review  Outcome: Ongoing (interventions implemented as appropriate)   01/09/18 0419   Coping/Psychosocial Response Interventions   Plan Of Care Reviewed With patient   Outcome Evaluation   Outcome Summary/Follow up Plan Pt had a little anxiety tonight, but otherwise rested decently. Dr Márquez would like to look into long term care due to oxygen needs still being high.     Goal: Adult Individualization and Mutuality  Outcome: Ongoing (interventions implemented as appropriate)    Goal: Discharge Needs Assessment  Outcome: Ongoing (interventions implemented as appropriate)      Problem: COPD, Chronic Bronchitis/Emphysema (Adult)  Goal: Signs and Symptoms of Listed Potential Problems Will be Absent or Manageable (COPD, Chronic Bronchitis/Emphysema)  Outcome: Ongoing (interventions implemented as appropriate)      Problem: Respiratory Insufficiency (Adult)  Goal: Acid/Base Balance  Outcome: Ongoing (interventions implemented as appropriate)    Goal: Effective Ventilation  Outcome: Ongoing (interventions implemented as appropriate)

## 2018-01-09 NOTE — THERAPY RE-EVALUATION
Acute Care - Physical Therapy Re-Evaluation   [facility]     Patient Name: Thurman Mendel Parsons  : 1950  MRN: [id number]  Today's Date: 2018   Onset of Illness/Injury or Date of Surgery Date: 17  Date of Referral to PT: 17  Referring Physician: Dr. Huber      Admit Date: 2017     Visit Dx:    ICD-10-CM ICD-9-CM   1. Acute exacerbation of chronic obstructive pulmonary disease (COPD) J44.1 491.21   2. Acute on chronic respiratory failure with hypercapnia J96.22 518.84   3. Impaired mobility and ADLs Z74.09 799.89   4. Impaired functional mobility, balance, gait, and endurance Z74.09 V49.89     Patient Active Problem List   Diagnosis   • Anxiety   • Atherosclerotic heart disease of native coronary artery without angina pectoris   • Atrial fibrillation   • Chronic kidney disease, stage III (moderate)   • Steroid-dependent COPD   • Degeneration of cervical intervertebral disc   • Diabetes mellitus   • GERD without esophagitis   • Diverticulosis   • Hemorrhoids   • Hiatal hernia   • Hyperlipidemia   • Hypertension   • Kyphosis, acquired   • Mitral and aortic valve disease   • Phimosis   • Sleep apnea   • Enlarged prostate without lower urinary tract symptoms (luts)   • History of arthritis   • Back pain   • Depression   • Stroke syndrome   • History of ventricular septal defect   • Neck pain   • Impaired mobility and ADLs   • Physical deconditioning   • Acute exacerbation of chronic obstructive pulmonary disease (COPD)     Past Medical History:   Diagnosis Date   • Abnormal liver enzymes    • Acute bronchitis    • Anemia    • Anxiety    • Arthritis    • Atherosclerotic heart disease of native coronary artery without angina pectoris    • Atrial fibrillation    • Atypical chest pain    • Back pain    • BPH (benign prostatic hyperplasia)    • Cardiac pain    • Chronic bronchitis    • Chronic kidney disease    • COPD (chronic obstructive pulmonary disease)    • Degeneration of cervical  intervertebral disc    • Depression    • Diabetes mellitus    • Diverticulosis    • Dyspnea    • Esophageal reflux    • Esophagitis    • Gastritis    • Hematuria    • Hemorrhoids    • Hiatal hernia    • History of arthritis    • History of myocardial infarction    • History of peripheral neuropathy    • History of ventricular septal defect    • History of ventricular septal defect    • Hyperlipidemia    • Hypertension    • Infectious diarrhea    • Infectious diarrhea    • Kyphosis, acquired    • Lumbar radiculopathy    • Mitral and aortic valve disease    • Myocardial infarction    • Nephrolithiasis    • Phimosis    • Respiratory failure    • Sleep apnea    • Stroke syndrome    • Stroke syndrome    • Urinary calculus    • Urinary tract infection    • Ventral hernia      Past Surgical History:   Procedure Laterality Date   • HERNIA REPAIR     • SUPRAPUBIC CATHETER INSERTION      History of Percutaneous Catheter Placement Into Ureter   • THROAT SURGERY     • VSD REPAIR      History of VSD Repair By Patch Closure          PT ASSESSMENT (last 72 hours)      PT Evaluation       01/09/18 1609 01/09/18 1446    Rehab Evaluation    Document Type re-evaluation  -JR therapy note (daily note)  -SD    Subjective Information agree to therapy;complains of;dyspnea  -JR agree to therapy;no complaints  -SD    Patient Effort, Rehab Treatment good  -JR good  -SD    Symptoms Noted During/After Treatment fatigue  -JR fatigue  -SD    General Information    Patient Profile Review yes  -JR     Onset of Illness/Injury or Date of Surgery Date 12/24/17  -JR     General Observations Supine in bed with oxygen at 15LPM with venimask  -JR     Pertinent History Of Current Problem PT for the past 2 weeks with good results  -JR     Precautions/Limitations fall precautions;oxygen therapy device and L/min  -JR     Prior Level of Function independent:;all household mobility  -JR     Equipment Currently Used at Home rollator;oxygen;commode;shower chair   -JR     Plans/Goals Discussed With patient;agreed upon  -JR     Risks Reviewed patient:;increased discomfort  -JR     Benefits Reviewed patient:;increase strength;increase balance;increase independence;improve function  -JR     Clinical Impression    Patient/Family Goals Statement Patient wants to get well enough to go home  -JR     Criteria for Skilled Therapeutic Interventions Met yes;treatment indicated  -JR     Pathology/Pathophysiology Noted (Describe Specifically for Each System) pulmonary;musculoskeletal;neuromuscular;cardiovascular  -JR     Impairments Found (describe specific impairments) aerobic capacity/endurance;gait, locomotion, and balance;joint integrity and mobility  -JR     Vital Signs    Pre SpO2 (%) 98  -JR 94  -SD    O2 Delivery Pre Treatment supplemental O2   15LPM   -JR supplemental O2   BiPap  -SD    Intra SpO2 (%) 96  -JR 92  -SD    O2 Delivery Intra Treatment supplemental O2   15 LPM  -JR supplemental O2   Mask 13L  -SD    Post SpO2 (%) 97  -JR 97  -SD    O2 Delivery Post Treatment supplemental O2   15LPM  -JR supplemental O2   Mask 13L  -SD    Pre Patient Position Supine  -JR Supine  -SD    Intra Patient Position Standing  -JR Sitting  -SD    Post Patient Position Supine  -JR Supine  -SD    Pain Assessment    Pain Assessment No/denies pain  -JR 0-10  -SD    Pain Score  3  -SD    Post Pain Score  3  -SD    Pain Type  Acute pain  -SD    Pain Location  Hip  -SD    Pain Orientation  Left  -SD    Pain Intervention(s)  Repositioned  -SD    Response to Interventions  tolerated  -SD    Cognitive Assessment/Intervention    Current Cognitive/Communication Assessment functional  -JR     Orientation Status oriented x 4  -JR     Follows Commands/Answers Questions able to follow multi-step instructions  -JR     Personal Safety mild impairment  -JR     Personal Safety Interventions fall prevention program maintained;gait belt;nonskid shoes/slippers when out of bed  -JR     ROM (Range of Motion)    General  ROM Detail Grossly WFL  -JR     MMT (Manual Muscle Testing)    General MMT Assessment Detail Grossly WFL  -JR     Bed Mobility, Assessment/Treatment    Bed Mobility, Assistive Device head of bed elevated;bed rails  -JR bed rails;head of bed elevated  -SD    Bed Mob, Supine to Sit, [place] conditional independence  -JR conditional independence  -SD    Bed Mob, Sit to Supine, [place] conditional independence  -JR conditional independence  -SD    Transfer Assessment/Treatment    Transfers, Sit-Stand [place] contact guard assist  -JR     Transfers, Stand-Sit [place] contact guard assist  -JR     Transfers, Sit-Stand-Sit, Assist Device rolling walker  -JR     Transfer, Safety Issues sequencing ability decreased;balance decreased during turns;step length decreased  -JR     Transfer, Impairments strength decreased;impaired balance;coordination impaired  -JR     Gait Assessment/Treatment    Gait, [place] Level contact guard assist  -JR     Gait, Assistive Device rolling walker  -JR     Gait, Distance (Feet) 20  -JR     Gait, Gait Deviations decreased heel strike;pati decreased;stride width increased;stride length decreased;forward flexed posture  -JR     Gait, Safety Issues step length decreased;balance decreased during turns;sequencing ability decreased  -JR     Gait, Impairments strength decreased;impaired balance;motor control impaired  -JR     Therapy Exercises    Bilateral Lower Extremities AROM:;10 reps;standing;heel raises;hip flexion;knee flexion  -JR     Bilateral Upper Extremity  AROM:;15 reps;shoulder abduction/adduction;shoulder extension/flexion  -SD    BUE Resistance  theraband  -SD    Positioning and Restraints    Pre-Treatment Position in bed  -JR in bed  -SD    Post Treatment Position bed  -JR bed  -SD    In Bed sitting EOB;call light within reach;encouraged to call for assist  -JR supine;call light within reach;encouraged to call for assist  -SD      01/09/18 0800 01/08/18 0214     Rehab Evaluation    Document Type  re-evaluation  -SD    Subjective Information  agree to therapy;complains of;pain  -SD    Patient Effort, Rehab Treatment  good  -SD    Symptoms Noted During/After Treatment  fatigue  -SD    Vital Signs    Pre SpO2 (%)  94  -SD    O2 Delivery Pre Treatment  supplemental O2   high flow  -SD    Intra SpO2 (%)  90  -SD    O2 Delivery Intra Treatment  supplemental O2   high flow  -SD    Post SpO2 (%)  92  -SD    O2 Delivery Post Treatment  supplemental O2   high flow  -SD    Pre Patient Position  Supine  -SD    Intra Patient Position  Standing  -SD    Post Patient Position  Supine  -SD    Pain Assessment    Pain Assessment  0-10  -SD    Pain Score  6  -SD    Post Pain Score  6  -SD    Pain Type  Acute pain  -SD    Pain Location  Hip  -SD    Pain Orientation  Left  -SD    Pain Intervention(s)  Repositioned;Ambulation/increased activity  -SD    Response to Interventions  tolerated  -SD    Muscle Tone Assessment    Muscle Tone Assessment Bilateral Upper Extremities  -MM     Bilateral Upper Extremities Muscle Tone Assessment mildly decreased tone  -MM     Bilateral Lower Extremities Muscle Tone Assessment mildly decreased tone  -MM     Bed Mobility, Assessment/Treatment    Bed Mobility, Assistive Device  bed rails;head of bed elevated  -SD    Bed Mob, Supine to Sit, [place]  supervision required  -SD    Bed Mob, Sit to Supine, [place]  supervision required  -SD    Bed Mobility, Impairments  strength decreased  -SD    Transfer Assessment/Treatment    Transfers, Sit-Stand [place]  contact guard assist  -SD    Transfers, Stand-Sit [place]  contact guard assist  -SD    Transfers, Sit-Stand-Sit, Assist Device  rolling walker  -SD    Toilet Transfer, [place]  contact guard assist  -SD    Toilet Transfer, Assistive Device  rolling walker;bedside commode without drop arms  -SD    Transfer, Safety Issues  balance decreased during turns  -SD    Transfer, Impairments   strength decreased;impaired balance  -SD    Positioning and Restraints    Pre-Treatment Position  in bed  -SD    Post Treatment Position  bed  -SD    In Bed  supine;call light within reach;encouraged to call for assist;with family/caregiver  -SD      01/08/18 1600 01/08/18 1411    Rehab Evaluation    Document Type  therapy note (daily note)  -RM    Subjective Information  agree to therapy;complains of;dyspnea  -RM    Patient Effort, Rehab Treatment  adequate  -RM    Symptoms Noted During/After Treatment  shortness of breath  -RM    Symptoms Noted Comment  O2 SATS >96% during therapy  -RM    General Information    Precautions/Limitations  fall precautions;oxygen therapy device and L/min  -RM    Pain Assessment    Pain Assessment  No/denies pain  -RM    Muscle Tone Assessment    Muscle Tone Assessment Bilateral Upper Extremities  -MM     Bilateral Upper Extremities Muscle Tone Assessment mildly decreased tone  -MM     Bilateral Lower Extremities Muscle Tone Assessment mildly decreased tone  -MM     Bed Mobility, Assessment/Treatment    Bed Mobility, Assistive Device  head of bed elevated;bed rails  -RM    Bed Mob, Supine to Sit, [place]  supervision required  -RM    Bed Mobility, Impairments  strength decreased  -RM    Transfer Assessment/Treatment    Transfers, Sit-Stand [place]  contact guard assist  -RM    Transfers, Stand-Sit [place]  contact guard assist  -RM    Transfers, Sit-Stand-Sit, Assist Device  rolling walker  -RM    Transfer, Safety Issues  balance decreased during turns;step length decreased  -RM    Transfer, Impairments  strength decreased;impaired balance  -RM    Gait Assessment/Treatment    Gait, [place] Level  contact guard assist  -RM    Gait, Assistive Device  rolling walker  -RM    Gait, Distance (Feet)  24  -RM    Gait, Gait Pattern Analysis  swing-through gait  -RM    Gait, Gait Deviations  decreased heel strike;forward flexed posture;step length decreased;stride width  increased;toe-to-floor clearance decreased  -RM    Gait, Safety Issues  step length decreased  -RM    Gait, Impairments  strength decreased;impaired balance  -RM    Therapy Exercises    Bilateral Lower Extremities  --   B LE restorator 2' fwd and 2' bkwd.   -RM    Positioning and Restraints    Pre-Treatment Position  in bed  -RM    Post Treatment Position  chair  -RM    In Chair  reclined;call light within reach;encouraged to call for assist;exit alarm on;notified nsg  -RM      01/08/18 1200 01/08/18 0800    Muscle Tone Assessment    Muscle Tone Assessment Bilateral Upper Extremities  -MM Bilateral Upper Extremities  -MM    Bilateral Upper Extremities Muscle Tone Assessment mildly decreased tone  -MM mildly decreased tone  -MM    Bilateral Lower Extremities Muscle Tone Assessment mildly decreased tone  -MM mildly decreased tone  -MM      01/07/18 2100 01/07/18 1551    Rehab Evaluation    Document Type  therapy note (daily note)  -CC    Subjective Information  agree to therapy  -CC    Patient Effort, Rehab Treatment  good  -CC    Symptoms Noted During/After Treatment  shortness of breath  -CC    Symptoms Noted Comment  O2 SATS >96% during therapy  -CC    General Information    Precautions/Limitations  fall precautions;oxygen therapy device and L/min  -CC    Equipment Currently Used at Home rollator;oxygen;commode;shower chair  -DW     Living Environment    Transportation Available family or friend will provide;car  -DW     Pain Assessment    Pain Assessment  0-10  -CC    Pain Score  6  -CC    Post Pain Score  6  -CC    Pain Type  Acute pain  -CC    Pain Location  Hip  -CC    Pain Orientation  Left  -CC    Pain Intervention(s)  Repositioned  -CC    Vision Assessment/Intervention    Visual Impairment  WFL with corrective lenses  -CC    Therapy Exercises    Bilateral Lower Extremities  AROM:;15 reps;supine;ankle pumps/circles;glut sets;heel slides;hip abduction/adduction;quad sets;SAQ  -CC    Positioning and Restraints     Pre-Treatment Position  in bed  -CC    Post Treatment Position  bed  -CC    In Bed  fowlers;encouraged to call for assist  -CC      User Key  (r) = Recorded By, (t) = Taken By, (c) = Cosigned By    Initials Name Provider Type    JR Kaylee Kurtz, PT Physical Therapist    MM Lolita Aguilera, RN Registered Nurse    JUAN DIEGO Sheikh, RN Registered Nurse    CC Rebeca Sawyer, PTA Physical Therapy Assistant    RM Zenon Rothman, PTA Physical Therapy Assistant    TONY Edwards, OT Occupational Therapist          Physical Therapy Education     Title: PT OT SLP Therapies (Active)     Topic: Physical Therapy (Done)     Point: Mobility training (Done)    Learning Progress Summary    Learner Readiness Method Response Comment Documented by Status   Patient Acceptance E VU Importance of mobility to recovery JR 01/09/18 1841 Done    Acceptance E,D VU,NR Pursed lip breathing with activity.  01/08/18 1735 Done    Acceptance E VU  CK 01/06/18 1441 Done    Acceptance E VU  CK 01/05/18 1747 Done    Acceptance E VU Ther ex for HEP; safety techniques for transfers.  01/04/18 1532 Done    Acceptance E VU Purpose of PT; PLB/pacing to alleviate SOA with activity.  01/03/18 1328 Done    Acceptance E,D VU,NR   01/02/18 1550 Done    Acceptance E NR   01/02/18 0334 Active    Acceptance E VU Importance of mobility to recovery  12/28/17 1706 Done   Family Acceptance E VU Purpose of PT; PLB/pacing to alleviate SOA with activity.  01/03/18 1328 Done               Point: Home exercise program (Done)    Learning Progress Summary    Learner Readiness Method Response Comment Documented by Status   Patient Acceptance E,D VU,NR Pursed lip breathing with activity.  01/08/18 1735 Done    Acceptance E VU  CK 01/06/18 1441 Done    Acceptance E VU  CK 01/05/18 1747 Done    Acceptance E VU Ther ex for HEP; safety techniques for transfers.  01/04/18 1532 Done    Acceptance E VU Purpose of PT; PLB/pacing to alleviate SOA with  activity.  01/03/18 1328 Done    Acceptance E,D VU,NR   01/02/18 1550 Done    Acceptance E NR   01/02/18 0334 Active    Acceptance E VU instructed on the importance of exercises to improving mobility  12/31/17 1523 Done   Family Acceptance E VU Purpose of PT; PLB/pacing to alleviate SOA with activity.  01/03/18 1328 Done    Acceptance E VU instructed on the importance of exercises to improving mobility  12/31/17 1523 Done               Point: Body mechanics (Done)    Learning Progress Summary    Learner Readiness Method Response Comment Documented by Status   Patient Acceptance E VU  CK 01/06/18 1441 Done               Point: Precautions (Done)    Learning Progress Summary    Learner Readiness Method Response Comment Documented by Status   Patient Acceptance E VU   01/06/18 1441 Done    Acceptance E VU   01/05/18 1747 Done    Acceptance E VU Ther ex for HEP; safety techniques for transfers.  01/04/18 1532 Done    Acceptance E VU Purpose of PT; PLB/pacing to alleviate SOA with activity.  01/03/18 1328 Done   Family Acceptance E VU Purpose of PT; PLB/pacing to alleviate SOA with activity.  01/03/18 1328 Done                      User Key     Initials Effective Dates Name Provider Type Discipline     10/26/16 -  Kaylee Kurtz, PT Physical Therapist PT     10/26/16 -  Xochitl Hernandez, PT Physical Therapist PT     10/26/16 -  Jessie Davenport PTA Physical Therapy Assistant PT     10/26/16 -  Zenon Rothman, PTA Physical Therapy Assistant PT     08/31/17 -  Mayda Nguyen, RN Registered Nurse Nurse                PT Recommendation and Plan  Anticipated Discharge Disposition: inpatient rehabilitation facility  Planned Therapy Interventions: balance training, strengthening, bed mobility training, transfer training, gait training, patient/family education  PT Frequency: daily  Plan of Care Review  Plan Of Care Reviewed With: patient  Progress: improving  Outcome Summary/Follow up Plan:  Patient is able to demonstrate improvements in strength, transfers, balance and activity tolerance.  He is expected to continue progressing with additional PT services.          IP PT Goals       01/09/18 1845 01/08/18 1735 01/07/18 1551    Bed Mobility PT LTG    Bed Mobility PT LTG, Date Established 01/09/18  -JR      Bed Mobility PT LTG, Time to Achieve 2 wks  -JR      Bed Mobility PT LTG, Activity Type all bed mobility  -JR      Bed Mobility PT LTG, [place] Level conditional independence  -JR      Bed Mobility PT LTG, Additional Goal with oxygen saturation levels at or above 90%  -JR      Bed Mobility PT LTG, Outcome goal revised  -JR goal partially met  -RM goal partially met  -CC    Transfer Training PT LTG    Transfer Training PT LTG, Date Established 01/09/18  -JR      Transfer Training PT LTG, Time to Achieve 2 wks  -JR      Transfer Training PT LTG, Activity Type sit to stand/stand to sit;bed to chair /chair to bed  -JR      Transfer Training PT LTG, [place] Level supervision required  -JR      Transfer Training PT LTG, Assist Device walker, rolling  -JR      Transfer Training PT LTG, Additional Goal with oxygen saturation levels at or above 90%  -JR      Transfer Training PT LTG, Outcome goal revised  -JR goal ongoing  -RM goal ongoing  -CC    Gait Training PT LTG    Gait Training Goal PT LTG, Date Established 01/09/18  -JR      Gait Training Goal PT LTG, Time to Achieve 2 wks  -JR      Gait Training Goal PT LTG, [place] Level contact guard assist  -JR      Gait Training Goal PT LTG, Assist Device walker, rolling  -JR      Gait Training Goal PT LTG, Distance to Achieve 100 feet   -JR      Gait Training Goal PT LTG, Additional Goal with oxygen saturation level of at least 90%  -JR      Gait Training Goal PT LTG, Outcome goal revised  -JR goal ongoing  -RM goal ongoing  -CC      01/06/18 1441 01/05/18 1747 01/04/18 1532    Bed Mobility PT LTG    Bed Mobility PT LTG, Outcome goal partially met   -CK goal partially met  -CK goal partially met  -LM    Transfer Training PT LTG    Transfer Training PT LTG, Outcome goal ongoing  -CK goal ongoing  -CK goal partially met  -LM    Gait Training PT LTG    Gait Training Goal PT LTG, Outcome goal ongoing  -CK goal ongoing  -CK goal ongoing  -LM      01/03/18 1329 01/02/18 1551 12/31/17 1532    Bed Mobility PT LTG    Bed Mobility PT LTG, Outcome goal ongoing  -LM goal ongoing  -RM goal ongoing  -JR    Transfer Training PT LTG    Transfer Training PT LTG, Outcome goal ongoing  -LM goal ongoing  -RM goal ongoing  -JR    Gait Training PT LTG    Gait Training Goal PT LTG, Outcome goal ongoing  -LM  goal ongoing  -JR      12/28/17 1711          Bed Mobility PT LTG    Bed Mobility PT LTG, Date Established 12/28/17  -JR      Bed Mobility PT LTG, Time to Achieve 2 wks  -JR      Bed Mobility PT LTG, Activity Type all bed mobility  -JR      Bed Mobility PT LTG, [place] Level minimum assist (75% patient effort)  -JR      Transfer Training PT LTG    Transfer Training PT LTG, Date Established 12/28/17  -JR      Transfer Training PT LTG, Time to Achieve 2 wks  -JR      Transfer Training PT LTG, Activity Type sit to stand/stand to sit;bed to chair /chair to bed  -JR      Transfer Training PT LTG, [place] Level minimum assist (75% patient effort)  -JR      Transfer Training PT LTG, Assist Device walker, rolling  -JR      Gait Training PT LTG    Gait Training Goal PT LTG, Date Established 12/28/17  -JR      Gait Training Goal PT LTG, Time to Achieve 2 wks  -JR      Gait Training Goal PT LTG, [place] Level minimum assist (75% patient effort)  -JR      Gait Training Goal PT LTG, Assist Device walker, rolling  -JR      Gait Training Goal PT LTG, Distance to Achieve 25  -JR        User Key  (r) = Recorded By, (t) = Taken By, (c) = Cosigned By    Initials Name Provider Type    JR Kaylee Kurtz, PT Physical Therapist    FRANCESCO Hernandez, PT Physical Therapist    ALIRIO NEWMAN  [place], [facility] Physical Therapy Assistant    CARMELO Davenport, [facility] Physical Therapy Assistant    RM Zenon Rothman, [facility] Physical Therapy Assistant                Outcome Measures       01/09/18 1609 01/09/18 1446 01/08/18 1623    How much help from another person do you currently need...    Turning from your back to your side while in flat bed without using bedrails? 4  -JR      Moving from lying on back to sitting on the side of a flat bed without bedrails? 4  -JR      Moving to and from a bed to a chair (including a wheelchair)? 3  -JR      Standing up from a chair using your arms (e.g., wheelchair, bedside chair)? 3  -JR      Climbing 3-5 steps with a railing? 2  -JR      To walk in hospital room? 3  -JR      AM-PAC 6 Clicks Score 19  -JR      How much help from another is currently needed...    Putting on and taking off regular lower body clothing?  3  -SD 3  -SD    Bathing (including washing, rinsing, and drying)  3  -SD 3  -SD    Toileting (which includes using toilet bed pan or urinal)  3  -SD 3  -SD    Putting on and taking off regular upper body clothing  3  -SD 3  -SD    Taking care of personal grooming (such as brushing teeth)  3  -SD 3  -SD    Eating meals  4  -SD 4  -SD    Score  19  -SD 19  -SD    Functional Assessment    Outcome Measure Options AM-PAC 6 Clicks Basic Mobility (PT)  -JR AM-PAC 6 Clicks Daily Activity (OT)  -SD AM-PAC 6 Clicks Daily Activity (OT)  -SD      User Key  (r) = Recorded By, (t) = Taken By, (c) = Cosigned By    Initials Name Provider Type    JR Kaylee Kurtz PT Physical Therapist    TONY Edwards OT Occupational Therapist           Time Calculation:         PT Charges       01/09/18 1844          Time Calculation    Start Time 1609  -JR      PT Received On 01/09/18  -JR      PT Goal Re-Cert Due Date 01/19/18  -JR        User Key  (r) = Recorded By, (t) = Taken By, (c) = Cosigned By    Initials Name Provider Type    JR Kaylee Kurtz PT Physical Therapist          Therapy  Charges for Today     Code Description Service Date Service Provider Modifiers Qty    34158426685 HC PT RE-EVAL ESTABLISHED PLAN 2 1/9/2018 Kaylee Kurtz, PT GP 1    02698605165 HC PT THER PROC EA 15 MIN 1/9/2018 Kaylee Kurtz, PT GP 1          PT G-Codes  Outcome Measure Options: AM-PAC 6 Clicks Basic Mobility (PT)      Kaylee Kurtz, PT  1/9/2018

## 2018-01-09 NOTE — THERAPY TREATMENT NOTE
Acute Care - Occupational Therapy Treatment Note   [place]     Patient Name: Thurman Mendel Parsons  : 1950  MRN: [id number]  Today's Date: 2018  Onset of Illness/Injury or Date of Surgery Date: 17  Date of Referral to OT: 17  Referring Physician: Dr. Huber      Admit Date: 2017    Visit Dx:     ICD-10-CM ICD-9-CM   1. Acute exacerbation of chronic obstructive pulmonary disease (COPD) J44.1 491.21   2. Acute on chronic respiratory failure with hypercapnia J96.22 518.84   3. Impaired mobility and ADLs Z74.09 799.89   4. Impaired functional mobility, balance, gait, and endurance Z74.09 V49.89     Patient Active Problem List   Diagnosis   • Anxiety   • Atherosclerotic heart disease of native coronary artery without angina pectoris   • Atrial fibrillation   • Chronic kidney disease, stage III (moderate)   • Steroid-dependent COPD   • Degeneration of cervical intervertebral disc   • Diabetes mellitus   • GERD without esophagitis   • Diverticulosis   • Hemorrhoids   • Hiatal hernia   • Hyperlipidemia   • Hypertension   • Kyphosis, acquired   • Mitral and aortic valve disease   • Phimosis   • Sleep apnea   • Enlarged prostate without lower urinary tract symptoms (luts)   • History of arthritis   • Back pain   • Depression   • Stroke syndrome   • History of ventricular septal defect   • Neck pain   • Impaired mobility and ADLs   • Physical deconditioning   • Acute exacerbation of chronic obstructive pulmonary disease (COPD)             Adult Rehabilitation Note       18 1446 18 1411 18 1551    Rehab Assessment/Intervention    Discipline occupational therapist  -SD physical therapy assistant  -RM physical therapy assistant  -CC    Document Type therapy note (daily note)  -SD therapy note (daily note)  -RM therapy note (daily note)  -CC    Subjective Information agree to therapy;no complaints  -SD agree to therapy;complains of;dyspnea  -RM agree to therapy  -CC    Patient  Effort, Rehab Treatment good  -SD adequate  -RM good  -CC    Symptoms Noted During/After Treatment fatigue  -SD shortness of breath  -RM shortness of breath  -CC    Symptoms Noted Comment  O2 SATS >96% during therapy  -RM O2 SATS >96% during therapy  -CC    Precautions/Limitations  fall precautions;oxygen therapy device and L/min  -RM fall precautions;oxygen therapy device and L/min  -CC    Specific Treatment Considerations  25 lpm at 35%  -RM     Recorded by [SD] Kelle Edwards OT [RM] Zenon Rothman, PTA [CC] Rebeca Sawyer PTA    Vital Signs    Pre SpO2 (%) 94  -SD      O2 Delivery Pre Treatment supplemental O2   BiPap  -SD      Intra SpO2 (%) 92  -SD      O2 Delivery Intra Treatment supplemental O2   Mask 13L  -SD      Post SpO2 (%) 97  -SD      O2 Delivery Post Treatment supplemental O2   Mask 13L  -SD      Pre Patient Position Supine  -SD      Intra Patient Position Sitting  -SD      Post Patient Position Supine  -SD      Recorded by [SD] Kelle Edwards OT      Pain Assessment    Pain Assessment 0-10  -SD No/denies pain  -RM 0-10  -CC    Pain Score 3  -SD  6  -CC    Post Pain Score 3  -SD  6  -CC    Pain Type Acute pain  -SD  Acute pain  -CC    Pain Location Hip  -SD  Hip  -CC    Pain Orientation Left  -SD  Left  -CC    Pain Intervention(s) Repositioned  -SD  Repositioned  -CC    Response to Interventions tolerated  -SD      Recorded by [SD] Kelle Edwards OT [RM] Zenon Rothman PTA [CC] Rebeca Sawyer PTA    Vision Assessment/Intervention    Visual Impairment   WFL with corrective lenses  -CC    Recorded by   [CC] Rebeca Sawyer PTA    Bed Mobility, Assessment/Treatment    Bed Mobility, Assistive Device bed rails;head of bed elevated  -SD head of bed elevated;bed rails  -RM     Bed Mob, Supine to Sit, [place] conditional independence  -SD supervision required  -RM     Bed Mob, Sit to Supine, [place] conditional independence  -SD      Bed Mobility, Impairments  strength decreased   -RM     Recorded by [SD] Kelle Edwards OT [RM] Zenon Rothman PTA     Transfer Assessment/Treatment    Transfers, Sit-Stand [place]  contact guard assist  -RM     Transfers, Stand-Sit [place]  contact guard assist  -RM     Transfers, Sit-Stand-Sit, Assist Device  rolling walker  -RM     Transfer, Safety Issues  balance decreased during turns;step length decreased  -RM     Transfer, Impairments  strength decreased;impaired balance  -RM     Recorded by  [RM] Zenon Rothman PTA     Gait Assessment/Treatment    Gait, [place] Level  contact guard assist  -RM     Gait, Assistive Device  rolling walker  -RM     Gait, Distance (Feet)  24  -RM     Gait, Gait Pattern Analysis  swing-through gait  -RM     Gait, Gait Deviations  decreased heel strike;forward flexed posture;step length decreased;stride width increased;toe-to-floor clearance decreased  -RM     Gait, Safety Issues  step length decreased  -RM     Gait, Impairments  strength decreased;impaired balance  -RM     Recorded by  [RM] Zenon Rothman PTA     Therapy Exercises    Bilateral Lower Extremities  --   B LE restorator 2' fwd and 2' bkwd.   -RM AROM:;15 reps;supine;ankle pumps/circles;glut sets;heel slides;hip abduction/adduction;quad sets;SAQ  -CC    Bilateral Upper Extremity AROM:;15 reps;shoulder abduction/adduction;shoulder extension/flexion  -SD      BUE Resistance theraband  -SD      Recorded by [SD] Kelle Edwards OT [RM] Zenon Rothman PTA [CC] Rebeca Sawyer PTA    Positioning and Restraints    Pre-Treatment Position in bed  -SD in bed  -RM in bed  -CC    Post Treatment Position bed  -SD chair  -RM bed  -CC    In Bed supine;call light within reach;encouraged to call for assist  -SD  fowlers;encouraged to call for assist  -CC    In Chair  reclined;call light within reach;encouraged to call for assist;exit alarm on;notified nsg  -RM     Recorded by [SD] Kelle Edwards OT [RM] Zenon Rothman PTA [CC] Rebeca Sawyer,  PTA      User Key  (r) = Recorded By, (t) = Taken By, (c) = Cosigned By    Initials Name Effective Dates    CC Rebeca Sawyer, PTA 10/26/16 -     RM Zenon Rothman, PTA 10/26/16 -     SD Kelle Edwrads, OT 06/30/17 -                 OT Goals       01/09/18 1546 01/08/18 1707 01/05/18 1707    Bed Mobility OT LTG    Bed Mobility OT LTG, Date Established  01/08/18  -SD     Bed Mobility OT LTG, Time to Achieve  2 wks  -SD     Bed Mobility OT LTG, Activity Type  all bed mobility  -SD     Bed Mobility OT LTG, [place] Level  conditional independence  -SD     Bed Mobility OT LTG, Date Goal Reviewed 01/09/18  -SD      Bed Mobility OT LTG, Outcome goal met  -SD goal ongoing  -SD     Bed Mobility OT LTG, Reason Goal Not Met  goals revised this date  -SD     Transfer Training OT LTG    Transfer Training OT LTG, Date Established  01/08/18  -SD     Transfer Training OT LTG, Time to Achieve  2 wks  -SD     Transfer Training OT LTG, Activity Type  sit to stand/stand to sit;toilet  -SD     Transfer Training OT LTG, [place] Level  supervision required  -SD     Transfer Training OT LTG, Assist Device  walker, rolling  -SD     Transfer Training OT LTG, Date Goal Reviewed 01/09/18  -SD      Transfer Training OT LTG, Outcome goal ongoing  -SD goal ongoing  -SD     Transfer Training OT LTG, Reason Goal Not Met  goals revised this date  -SD     Strength OT LTG    Strength Goal OT LTG, Date Established  01/08/18  -SD     Strength Goal OT LTG, Time to Achieve  2 wks  -SD     Strength Goal OT LTG, Measure to Achieve  Patient will perform 25 reps UB graded ther ex maintaining O2 >90 in order to increase strength and endurance.   -SD     Strength Goal OT LTG, Date Goal Reviewed 01/09/18  -SD  01/05/18  -SD    Strength Goal OT LTG, Outcome goal ongoing  -SD goal ongoing  -SD goal ongoing  -SD    Strength Goal OT LTG, Reason Goal Not Met  goals revised this date  -SD     LB Dressing OT LTG    LB Dressing Goal OT LTG, Date  Established  01/08/18  -SD     LB Dressing Goal OT LTG, Time to Achieve  2 wks  -SD     LB Dressing Goal OT LTG, [place] Level  contact guard assist  -SD     LB Dressing Goal OT LTG, Date Goal Reviewed 01/09/18  -SD  01/05/18  -SD    LB Dressing Goal OT LTG, Outcome goal ongoing  -SD goal ongoing  -SD goal ongoing  -SD    LB Dressing Goal OT LTG, Reason Goal Not Met  goals revised this date  -SD     UB Dressing OT LTG    UB Dressing Goal OT LTG, Date Established  01/08/18  -SD     UB Dressing Goal OT LTG, Time to Achieve  2 wks  -SD     UB Dressing Goal OT LTG, [place] Level  set up required  -SD     UB Dressing Goal OT LTG, Date Goal Reviewed 01/09/18  -SD  01/05/18  -SD    UB Dressing Goal OT LTG, Outcome goal ongoing  -SD goal ongoing  -SD goal ongoing  -SD      01/04/18 1550 01/03/18 1315 01/02/18 1519    Bed Mobility OT LTG    Bed Mobility OT LTG, Date Goal Reviewed  01/03/18  -SD 01/02/18  -AH    Bed Mobility OT LTG, Outcome  goal met  -SD goal ongoing  -AH    Transfer Training OT LTG    Transfer Training OT LTG, Date Goal Reviewed 01/04/18  -SD 01/03/18  -SD 01/02/18  -AH    Transfer Training OT LTG, Outcome goal met  -SD goal ongoing  -SD goal ongoing  -AH    Strength OT LTG    Strength Goal OT LTG, Date Goal Reviewed 01/04/18  -SD 01/03/18  -SD 01/02/18  -AH    Strength Goal OT LTG, Outcome goal ongoing  -SD goal ongoing  -SD goal ongoing  -AH    LB Dressing OT LTG    LB Dressing Goal OT LTG, Date Goal Reviewed 01/04/18  -SD 01/03/18  -SD 01/02/18  -AH    LB Dressing Goal OT LTG, Outcome goal ongoing  -SD goal ongoing  -SD goal ongoing  -AH    UB Dressing OT LTG    UB Dressing Goal OT LTG, Date Goal Reviewed 01/04/18  -SD 01/03/18  -SD 01/02/18  -AH    UB Dressing Goal OT LTG, Outcome goal ongoing  -SD goal ongoing  -SD goal ongoing  -AH      12/28/17 1404          Bed Mobility OT LTG    Bed Mobility OT LTG, Date Established 12/28/17  -AH      Bed Mobility OT LTG, Time to Achieve by discharge  -       Bed Mobility OT LTG, Activity Type supine to sit/sit to supine  -      Bed Mobility OT LTG, [place] Level minimum assist (75% patient effort)  -      Bed Mobility OT LTG, Outcome goal ongoing  -      Transfer Training OT LTG    Transfer Training OT LTG, Date Established 12/28/17  -      Transfer Training OT LTG, Time to Achieve by discharge  -      Transfer Training OT LTG, Activity Type sit to stand/stand to sit;bed to chair /chair to bed  -      Transfer Training OT LTG, [place] Level minimum assist (75% patient effort)  -      Transfer Training OT LTG, Outcome goal ongoing  -      Strength OT LTG    Strength Goal OT LTG, Date Established 12/28/17  -      Strength Goal OT LTG, Time to Achieve by discharge  -      Strength Goal OT LTG, Functional Goal Pt will perform 15 reps UE ex progressing from AROM to strengthening as pt tolerates  -      Strength Goal OT LTG, Outcome goal ongoing  -      LB Dressing OT LTG    LB Dressing Goal OT LTG, Date Established 12/28/17  -      LB Dressing Goal OT LTG, Time to Achieve by discharge  -      LB Dressing Goal OT LTG, [place] Level moderate assist (50% patient effort)  -      LB Dressing Goal OT LTG, Outcome goal ongoing  -      UB Dressing OT LTG    UB Dressing Goal OT LTG, Date Established 12/28/17  -      UB Dressing Goal OT LTG, Time to Achieve by discharge  -      UB Dressing Goal OT LTG, [place] Level minimum assist (75% patient effort)  -      UB Dressing Goal OT LTG, Outcome goal ongoing  -        User Key  (r) = Recorded By, (t) = Taken By, (c) = Cosigned By    Initials Name Provider Type    SOWMYA Gutierrez Occupational Therapist    TONY Edwards, OT Occupational Therapist          Occupational Therapy Education     Title: PT OT SLP Therapies (Active)     Topic: Occupational Therapy (Active)     Point: ADL training (Done)    Description: Instruct learner(s) on proper safety adaptation and  remediation techniques during self care or transfers.   Instruct in proper use of assistive devices.    Learning Progress Summary    Learner Readiness Method Response Comment Documented by Status   Patient Acceptance E VU Benefit of therapy and continued need for OT services. SD 01/08/18 1713 Done    Acceptance E VU Energy conservation techniques during bathing tasks to prevent dyspnea utilizing PLB techniques. SD 01/05/18 1711 Done    Acceptance E VU Safety/sequencing during functional transfers SD 01/04/18 1553 Done    Acceptance E VU PLB exercises during toileting task utilizing urinal in standing EOB to prevent dyspnea. SD 01/03/18 1319 Done    Acceptance E VU benefit of working with therapy to regain strength  01/02/18 1518 Done    Acceptance E NR  CH 01/02/18 0334 Active    Acceptance E VU Role of OT/POC  12/28/17 1401 Done   Family Acceptance E VU  AE 01/01/18 0103 Done    Acceptance E VU Role of OT/POC  12/28/17 1401 Done               Point: Home exercise program (Done)    Description: Instruct learner(s) on appropriate technique for monitoring, assisting and/or progressing therapeutic exercises/activities.    Learning Progress Summary    Learner Readiness Method Response Comment Documented by Status   Patient Acceptance E VU Benefit of exercise to improve breathing and functional performance. SD 01/09/18 1553 Done    Acceptance E VU benefit of working with therapy to regain strength  01/02/18 1518 Done                      User Key     Initials Effective Dates Name Provider Type Discipline     10/26/16 -  Akilah Gutierrez Occupational Therapist OT    SD 06/30/17 -  Kelle Edwards OT Occupational Therapist OT     08/31/17 -  Mayda Nguyen, RN Registered Nurse Nurse     01/20/17 -  Maryjane Subramanian, RN Registered Nurse Nurse                  OT Recommendation and Plan  Anticipated Discharge Disposition: inpatient rehabilitation facility  Planned Therapy Interventions: ADL retraining, bed  mobility training, strengthening, transfer training  Therapy Frequency: 3-5 times/wk  Plan of Care Review  Plan Of Care Reviewed With: patient  Progress: improving  Outcome Summary/Follow up Plan: OT tx completed. Patient on BiPap upon therapist entering room; removed BiPap and switched over to mask on 13L O2; became anxious, stating he couldn't feel the flow of oxygen coming through. Respiratory therapist assured patient he was getting O2 and the flow was going to feel different than high flow he had been receiving. Respiratory did not feel patient was appropriate to walk d/t increased anxiety in transitioning from high flow to mask. Patient completed UB ther ex EOB. Continue OT POC.         Outcome Measures       01/09/18 1446 01/08/18 1623       How much help from another is currently needed...    Putting on and taking off regular lower body clothing? 3  -SD 3  -SD     Bathing (including washing, rinsing, and drying) 3  -SD 3  -SD     Toileting (which includes using toilet bed pan or urinal) 3  -SD 3  -SD     Putting on and taking off regular upper body clothing 3  -SD 3  -SD     Taking care of personal grooming (such as brushing teeth) 3  -SD 3  -SD     Eating meals 4  -SD 4  -SD     Score 19  -SD 19  -SD     Functional Assessment    Outcome Measure Options AM-PAC 6 Clicks Daily Activity (OT)  -SD AM-PAC 6 Clicks Daily Activity (OT)  -SD       User Key  (r) = Recorded By, (t) = Taken By, (c) = Cosigned By    Initials Name Provider Type    TONY Edwards OT Occupational Therapist           Time Calculation:         Time Calculation- OT       01/09/18 1555          Time Calculation- OT    OT Start Time 1446  -SD      Total Timed Code Minutes- OT 20 minute(s)  -SD      OT Received On 01/09/18  -SD      OT Goal Re-Cert Due Date 01/18/18  -SD        User Key  (r) = Recorded By, (t) = Taken By, (c) = Cosigned By    Initials Name Provider Type    TONY Edwards OT Occupational Therapist           Therapy  Charges for Today     Code Description Service Date Service Provider Modifiers Qty    [id number]  OT RE-EVAL 2 1/8/2018 Kelle Edwards, OT GO 1    [id number]  OT THER PROC EA 15 MIN 1/9/2018 Kelle Edwards OT GO 1               Kelle Edwards OT  1/9/2018

## 2018-01-09 NOTE — PROGRESS NOTES
[facility]    Name:  Thurman Mendel Parsons   Age:  67 y.o.  Sex:  male  :  1950  MRN:  [id number]   Visit Number:  [id number]  Admission Date:  2017  Date Of Service:  18  Primary Care Physician:  Miguel Rojas MD     LOS: 16 days :  Patient Care Team:  Miguel Rojas MD as PCP - General  Miguel Rojas MD as PCP - Family Medicine:    History taken from:     patient    Chief Complaint:      Dyspnea    Subjective     Interval History:     Patient seen today.  Patient is a 67-year-old  male with chronic hypoxic respiratory failure secondary to COPD, PEDRO and hypertension.  Patient was admitted with bronchopneumonia which was treated with appropriate antibiotics.  Cultures have been negative.  He also has been treated with steroids for COPD exacerbation.  His oxygen requirements have been extremely high.  Dr. Lopez is following, and has been adjusting his oxygen.  Hopefully the patient will be down around 6 L, and then he can go to a local rehabilitation.  He denies any chest pressure, nausea, vomiting, or pain.  He continues to have dyspnea on exertion.  He is on Pulmicort, Mucinex, DuoNeb, Solu-Medrol, Tessalon, Tussionex, BiPAP and oscillating positive expiratory pressure.  He has improved very slowly, but is near to the point where he can be discharged.  Review of Systems:     All systems were reviewed and negative except for:  Respiratory: positive for  cough, productive and shortness of air    Objective     Vital Signs:    Temp:  [97.5 °F (36.4 °C)-98.4 °F (36.9 °C)] 98.3 °F (36.8 °C)  Heart Rate:  [] 84  Resp:  [17-22] 17  BP: (113-141)/(52-74) 141/66    Physical Exam:      General Appearance:    Alert, cooperative, in no acute distress   Head:    Normocephalic, without obvious abnormality, atraumatic   Eyes:            Lids and lashes normal, conjunctivae and sclerae normal, no   icterus, no pallor, corneas clear, PERRLA   Ears:     Ears appear intact with no abnormalities noted   Throat:   No oral lesions, no thrush, oral mucosa moist   Neck:   No adenopathy, supple, trachea midline, no thyromegaly, no   carotid bruit, no JVD   Back:     No kyphosis present, no scoliosis present, no skin lesions,      erythema or scars, no tenderness to percussion or                   palpation,   range of motion normal   Lungs:     Rhonchi bilaterally without uses accessory muscles respiration.      Heart:    Regular rhythm and normal rate, normal S1 and S2, no            murmur, no gallop, no rub, no click   Chest Wall:    No abnormalities observed   Abdomen:     Normal bowel sounds, no masses, no organomegaly, soft        non-tender, non-distended, no guarding, no rebound                tenderness   Rectal:     Deferred   Extremities:   4/5 strength in upper/lower extremities bilaterally    Pulses:   Pulses palpable and equal bilaterally   Skin:   No bleeding, bruising or rash   Lymph nodes:   No palpable adenopathy   Neurologic:   Cranial nerves 2 - 12 grossly intact, sensation intact, DTR       present and equal bilaterally        Results Review:      I reviewed the patient's new clinical results.    Labs:    Lab Results (last 24 hours)     Procedure Component Value Units Date/Time    CBC (No Diff) [[id number]]  (Abnormal) Collected:  01/09/18 0551    Specimen:  Blood Updated:  01/09/18 0614     WBC 12.72 (H) 10*3/mm3      RBC 3.53 (L) 10*6/mm3      Hemoglobin 10.6 (L) g/dL      Hematocrit 33.1 (L) %      MCV 93.8 fL      MCH 30.0 pg      MCHC 32.0 g/dL      RDW 12.4 %      RDW-SD 42.8 fl      MPV 9.1 fL      Platelets 192 10*3/mm3     Renal Function Panel [[id number]]  (Abnormal) Collected:  01/09/18 0551    Specimen:  Blood Updated:  01/09/18 0626     Glucose 111 (H) mg/dL      BUN 25 (H) mg/dL      Creatinine 0.90 mg/dL      Sodium 142 mmol/L      Potassium 4.1 mmol/L      Chloride 105 mmol/L      CO2 34.0 (H) mmol/L      Calcium 8.7 mg/dL      Albumin  2.90 (L) g/dL      Phosphorus 2.5 mg/dL      Anion Gap 7.1 mmol/L      BUN/Creatinine Ratio 27.8 (H)     eGFR Non African Amer 84 mL/min/1.73     Narrative:       Abnormal estimated GFR should be followed by more specific studies to confirm end stage chronic renal disease. The equation used for calculation may not be accurate for patients less than 19 years old, greater than 70 years old, patients at extremes of weight, malnutrition, or with acute renal dysfunction.    Magnesium [[id number]]  (Normal) Collected:  01/09/18 0551    Specimen:  Blood Updated:  01/09/18 0626     Magnesium 2.0 mg/dL            Radiology:    Imaging Results (last 24 hours)     ** No results found for the last 24 hours. **          Medication Review:       amLODIPine 5 mg Oral Q24H   atorvastatin 40 mg Oral Daily   bisacodyl 10 mg Rectal Daily   budesonide 0.5 mg Nebulization BID - RT   carvedilol 3.125 mg Oral Q12H   CHAPSTICK 1 each Apply externally BID   clopidogrel 75 mg Oral Daily   DULoxetine 30 mg Oral Daily   enoxaparin 40 mg Subcutaneous Q24H   finasteride 5 mg Oral Daily   fluticasone 1 spray Nasal Daily   guaifenesin-dextromethorphan 2 tablet Oral BID   ipratropium-albuterol 3 mL Nebulization Q6H While Awake - RT   losartan 50 mg Oral Daily   megestrol acetate 800 mg Oral Daily   metaxalone 400 mg Oral TID   methylPREDNISolone sodium succinate 40 mg Intravenous Q24H   mirtazapine 15 mg Oral Nightly   nystatin 5 mL Oral 4x Daily   oxybutynin 5 mg Oral QAM   pantoprazole 40 mg Oral BID AC   polyethylene glycol 17 g Oral Daily   sennosides-docusate sodium 2 tablet Oral Nightly   tamsulosin 0.4 mg Oral Nightly   traZODone 50 mg Oral Nightly            Assessment/Plan     Problem List Items Addressed This Visit     Impaired mobility and ADLs    Acute exacerbation of chronic obstructive pulmonary disease (COPD) - Primary    Relevant Medications    BREO ELLIPTA 200-25 MCG/INH aerosol powder     PROAIR  (90 BASE) MCG/ACT inhaler     budesonide (PULMICORT) 0.5 MG/2ML nebulizer solution    ipratropium-albuterol (DUO-NEB) 0.5-2.5 mg/mL nebulizer    predniSONE (DELTASONE) 10 MG tablet    benzonatate (TESSALON) 200 MG capsule    HYDROcod Polst-CPM Polst ER (TUSSIONEX PENNKINETIC) 10-8 MG/5ML ER suspension    pseudoephedrine-guaifenesin (MUCINEX D)  MG per 12 hr tablet    fluticasone (FLONASE) 50 MCG/ACT nasal spray    predniSONE (DELTASONE) 10 MG tablet      Other Visit Diagnoses     Acute on chronic respiratory failure with hypercapnia              Acute exacerbation of chronic obstructive pulmonary disease (COPD)  Bilateral pneumonia, present on admission  Elevated troponin, Suspect type II MI  Hypertension  Right bundle-branch block  Obstructive sleep apnea with noncompliance to BiPAP  Acute on chronic hypoxic respiratory failure With hypercapnia  Left hip pain  Decreased oral intake  Sciatica       Plan:    Continue to try to wean his FiO2.  Suspect this patient will need LTAC, case management is working on this.  In order to go to a local rehabilitation, his requirements would have to be at 6 L.  He is to continue this.  With PT and OT.  Check lab work in the a.m.  Anticipate he could either go to nursing home tomorrow if his oxygen is down, or to LTAC on bed ready.  Further recommendations will depend on clinical course.    Joshua Mendez,   01/09/18  2:51 PM

## 2018-01-09 NOTE — PLAN OF CARE
Problem: Patient Care Overview (Adult)  Goal: Plan of Care Review  Outcome: Ongoing (interventions implemented as appropriate)   01/09/18 1636   Coping/Psychosocial Response Interventions   Plan Of Care Reviewed With patient     Goal: Adult Individualization and Mutuality  Outcome: Ongoing (interventions implemented as appropriate)    Goal: Discharge Needs Assessment  Outcome: Ongoing (interventions implemented as appropriate)      Problem: COPD, Chronic Bronchitis/Emphysema (Adult)  Goal: Signs and Symptoms of Listed Potential Problems Will be Absent or Manageable (COPD, Chronic Bronchitis/Emphysema)  Outcome: Ongoing (interventions implemented as appropriate)      Problem: Respiratory Insufficiency (Adult)  Goal: Acid/Base Balance  Outcome: Ongoing (interventions implemented as appropriate)    Goal: Effective Ventilation  Outcome: Ongoing (interventions implemented as appropriate)      Problem: NPPV/CPAP (Adult)  Goal: Signs and Symptoms of Listed Potential Problems Will be Absent or Manageable (NPPV/CPAP)  Outcome: Ongoing (interventions implemented as appropriate)      Problem: Fall Risk (Adult)  Goal: Absence of Falls  Outcome: Ongoing (interventions implemented as appropriate)

## 2018-01-09 NOTE — PLAN OF CARE
Problem: NPPV/CPAP (Adult)  Intervention: Prevent Aspiration During Therapy   01/09/18 0200   Positioning   Head Of Bed (HOB) Position HOB at 20 degrees     Intervention: Assess/Manage Patient Tolerance of Noninvasive Ventilation   01/09/18 0300   Respiratory Interventions   Airway/Ventilation Management airway patency maintained;pulmonary hygiene promoted     Intervention: Prevent/Minimize Device Friction/Shearing and Pressure Points   01/09/18 0300   Respiratory Interventions   NPPV/CPAP Maintenance mask adjusted;other (see comments)  (comfort gel on nasal bridge)       Goal: Signs and Symptoms of Listed Potential Problems Will be Absent or Manageable (NPPV/CPAP)  Outcome: Ongoing (interventions implemented as appropriate)   01/09/18 0300   NPPV/CPAP   Problems Assessed (NPPV/CPAP) hypoxia/hypoxemia;situational response;skin breakdown;dry mucous membranes   Problems Present (NPPV/CPAP) skin breakdown;hypoxia/hypoxemia

## 2018-01-09 NOTE — PROGRESS NOTES
Continued Stay Note  CARLOS Shane     Patient Name: Thurman Mendel Parsons  MRN: [id number]  Today's Date: 1/9/2018    Admit Date: 12/24/2017          Discharge Plan       01/09/18 1509    Case Management/Social Work Plan    Additional Comments Per [facility] and  [facility] pt is not [facility] appropriate due to not having  a ICU stay they recommended [facility]  faxed to them  Left [facility] cannot accept  pt unless he 8-10 LNC not high flow At this time they do  not have a bed       01/09/18 1439    Case Management/Social Work Plan    Additional Comments Per               Discharge Codes     None        Expected Discharge Date and Time     Expected Discharge Date Expected Discharge Time    [place] 10, 2018             Danielle Saldana

## 2018-01-10 PROBLEM — J44.1 ACUTE EXACERBATION OF CHRONIC OBSTRUCTIVE PULMONARY DISEASE (COPD) (HCC): Status: RESOLVED | Noted: 2017-12-24 | Resolved: 2018-01-10

## 2018-01-10 LAB
ALBUMIN SERPL-MCNC: 3.3 G/DL (ref 3.5–5)
ANION GAP SERPL CALCULATED.3IONS-SCNC: 8.5 MMOL/L
BASOPHILS # BLD AUTO: 0.02 10*3/MM3 (ref 0–0.2)
BASOPHILS NFR BLD AUTO: 0.2 % (ref 0–2.5)
BUN BLD-MCNC: 33 MG/DL (ref 7–20)
BUN/CREAT SERPL: 27.5 (ref 6.3–21.9)
CALCIUM SPEC-SCNC: 9.2 MG/DL (ref 8.4–10.2)
CHLORIDE SERPL-SCNC: 104 MMOL/L (ref 98–107)
CO2 SERPL-SCNC: 34 MMOL/L (ref 26–30)
CREAT BLD-MCNC: 1.2 MG/DL (ref 0.6–1.3)
DEPRECATED RDW RBC AUTO: 42.5 FL (ref 37–54)
EOSINOPHIL # BLD AUTO: 0.17 10*3/MM3 (ref 0–0.7)
EOSINOPHIL NFR BLD AUTO: 1.4 % (ref 0–7)
ERYTHROCYTE [DISTWIDTH] IN BLOOD BY AUTOMATED COUNT: 12.3 % (ref 11.5–14.5)
GFR SERPL CREATININE-BSD FRML MDRD: 60 ML/MIN/1.73
GLUCOSE BLD-MCNC: 117 MG/DL (ref 74–98)
GLUCOSE BLDC GLUCOMTR-MCNC: 146 MG/DL (ref 70–130)
HCT VFR BLD AUTO: 36.5 % (ref 42–52)
HGB BLD-MCNC: 11.5 G/DL (ref 14–18)
IMM GRANULOCYTES # BLD: 0.07 10*3/MM3 (ref 0–0.06)
IMM GRANULOCYTES NFR BLD: 0.6 % (ref 0–0.6)
LYMPHOCYTES # BLD AUTO: 0.54 10*3/MM3 (ref 0.6–3.4)
LYMPHOCYTES NFR BLD AUTO: 4.4 % (ref 10–50)
MAGNESIUM SERPL-MCNC: 2 MG/DL (ref 1.6–2.3)
MCH RBC QN AUTO: 29.8 PG (ref 27–31)
MCHC RBC AUTO-ENTMCNC: 31.5 G/DL (ref 30–37)
MCV RBC AUTO: 94.6 FL (ref 80–94)
MONOCYTES # BLD AUTO: 0.69 10*3/MM3 (ref 0–0.9)
MONOCYTES NFR BLD AUTO: 5.7 % (ref 0–12)
NEUTROPHILS # BLD AUTO: 10.7 10*3/MM3 (ref 2–6.9)
NEUTROPHILS NFR BLD AUTO: 87.7 % (ref 37–80)
NRBC BLD MANUAL-RTO: 0 /100 WBC (ref 0–0)
PHOSPHATE SERPL-MCNC: 2.4 MG/DL (ref 2.5–4.5)
PLATELET # BLD AUTO: 223 10*3/MM3 (ref 130–400)
PMV BLD AUTO: 8.9 FL (ref 6–12)
POTASSIUM BLD-SCNC: 4.5 MMOL/L (ref 3.5–5.1)
RBC # BLD AUTO: 3.86 10*6/MM3 (ref 4.7–6.1)
SODIUM BLD-SCNC: 142 MMOL/L (ref 137–145)
WBC NRBC COR # BLD: 12.19 10*3/MM3 (ref 4.8–10.8)

## 2018-01-10 PROCEDURE — 80069 RENAL FUNCTION PANEL: CPT | Performed by: INTERNAL MEDICINE

## 2018-01-10 PROCEDURE — 25010000002 METHYLPREDNISOLONE PER 40 MG: Performed by: INTERNAL MEDICINE

## 2018-01-10 PROCEDURE — 25010000002 LORAZEPAM PER 2 MG: Performed by: INTERNAL MEDICINE

## 2018-01-10 PROCEDURE — 94799 UNLISTED PULMONARY SVC/PX: CPT

## 2018-01-10 PROCEDURE — 85025 COMPLETE CBC W/AUTO DIFF WBC: CPT | Performed by: INTERNAL MEDICINE

## 2018-01-10 PROCEDURE — 97116 GAIT TRAINING THERAPY: CPT

## 2018-01-10 PROCEDURE — 83735 ASSAY OF MAGNESIUM: CPT | Performed by: INTERNAL MEDICINE

## 2018-01-10 PROCEDURE — 97535 SELF CARE MNGMENT TRAINING: CPT

## 2018-01-10 PROCEDURE — 94660 CPAP INITIATION&MGMT: CPT

## 2018-01-10 PROCEDURE — 99232 SBSQ HOSP IP/OBS MODERATE 35: CPT | Performed by: FAMILY MEDICINE

## 2018-01-10 PROCEDURE — 82962 GLUCOSE BLOOD TEST: CPT

## 2018-01-10 PROCEDURE — 25010000002 ENOXAPARIN PER 10 MG: Performed by: INTERNAL MEDICINE

## 2018-01-10 PROCEDURE — 97530 THERAPEUTIC ACTIVITIES: CPT

## 2018-01-10 RX ADMIN — TRAZODONE HYDROCHLORIDE 50 MG: 50 TABLET ORAL at 20:35

## 2018-01-10 RX ADMIN — IPRATROPIUM BROMIDE AND ALBUTEROL SULFATE 3 ML: .5; 3 SOLUTION RESPIRATORY (INHALATION) at 19:05

## 2018-01-10 RX ADMIN — NYSTATIN 500000 UNITS: 100000 SUSPENSION ORAL at 18:59

## 2018-01-10 RX ADMIN — CARVEDILOL 3.12 MG: 3.12 TABLET, FILM COATED ORAL at 20:35

## 2018-01-10 RX ADMIN — DOCUSATE SODIUM AND SENNOSIDES 2 TABLET: 8.6; 5 TABLET, FILM COATED ORAL at 20:35

## 2018-01-10 RX ADMIN — HYDROCODONE BITARTRATE AND ACETAMINOPHEN 1 TABLET: 7.5; 325 TABLET ORAL at 22:55

## 2018-01-10 RX ADMIN — POLYETHYLENE GLYCOL 3350 17 G: 17 POWDER, FOR SOLUTION ORAL at 08:43

## 2018-01-10 RX ADMIN — LOSARTAN POTASSIUM 50 MG: 50 TABLET, FILM COATED ORAL at 08:44

## 2018-01-10 RX ADMIN — ENOXAPARIN SODIUM 40 MG: 40 INJECTION SUBCUTANEOUS at 20:35

## 2018-01-10 RX ADMIN — BUDESONIDE 0.5 MG: 0.5 INHALANT RESPIRATORY (INHALATION) at 07:51

## 2018-01-10 RX ADMIN — BUDESONIDE 0.5 MG: 0.5 INHALANT RESPIRATORY (INHALATION) at 19:05

## 2018-01-10 RX ADMIN — ATORVASTATIN CALCIUM 40 MG: 40 TABLET, FILM COATED ORAL at 08:42

## 2018-01-10 RX ADMIN — METHYLPREDNISOLONE SODIUM SUCCINATE 40 MG: 40 INJECTION, POWDER, FOR SOLUTION INTRAMUSCULAR; INTRAVENOUS at 03:44

## 2018-01-10 RX ADMIN — NYSTATIN 500000 UNITS: 100000 SUSPENSION ORAL at 20:40

## 2018-01-10 RX ADMIN — AMLODIPINE BESYLATE 5 MG: 5 TABLET ORAL at 08:44

## 2018-01-10 RX ADMIN — METAXALONE 400 MG: 800 TABLET ORAL at 20:35

## 2018-01-10 RX ADMIN — Medication 1 EACH: at 09:00

## 2018-01-10 RX ADMIN — GUAIFENESIN AND DEXTROMETHORPHAN HYDROBROMIDE 2 TABLET: 600; 30 TABLET, EXTENDED RELEASE ORAL at 08:42

## 2018-01-10 RX ADMIN — NYSTATIN 500000 UNITS: 100000 SUSPENSION ORAL at 11:22

## 2018-01-10 RX ADMIN — CARVEDILOL 3.12 MG: 3.12 TABLET, FILM COATED ORAL at 08:44

## 2018-01-10 RX ADMIN — PANTOPRAZOLE SODIUM 40 MG: 40 TABLET, DELAYED RELEASE ORAL at 06:14

## 2018-01-10 RX ADMIN — LORAZEPAM 0.5 MG: 2 INJECTION INTRAMUSCULAR; INTRAVENOUS at 22:55

## 2018-01-10 RX ADMIN — TAMSULOSIN HYDROCHLORIDE 0.4 MG: 0.4 CAPSULE ORAL at 20:35

## 2018-01-10 RX ADMIN — DULOXETINE HYDROCHLORIDE 30 MG: 30 CAPSULE, DELAYED RELEASE ORAL at 08:43

## 2018-01-10 RX ADMIN — METAXALONE 400 MG: 800 TABLET ORAL at 08:42

## 2018-01-10 RX ADMIN — GUAIFENESIN AND DEXTROMETHORPHAN HYDROBROMIDE 2 TABLET: 600; 30 TABLET, EXTENDED RELEASE ORAL at 20:35

## 2018-01-10 RX ADMIN — PANTOPRAZOLE SODIUM 40 MG: 40 TABLET, DELAYED RELEASE ORAL at 16:07

## 2018-01-10 RX ADMIN — FINASTERIDE 5 MG: 5 TABLET, FILM COATED ORAL at 08:44

## 2018-01-10 RX ADMIN — MIRTAZAPINE 15 MG: 15 TABLET, FILM COATED ORAL at 20:35

## 2018-01-10 RX ADMIN — IPRATROPIUM BROMIDE AND ALBUTEROL SULFATE 3 ML: .5; 3 SOLUTION RESPIRATORY (INHALATION) at 13:42

## 2018-01-10 RX ADMIN — CLOPIDOGREL BISULFATE 75 MG: 75 TABLET ORAL at 08:43

## 2018-01-10 RX ADMIN — FLUTICASONE PROPIONATE 1 SPRAY: 50 SPRAY, METERED NASAL at 08:45

## 2018-01-10 RX ADMIN — MEGESTROL ACETATE 800 MG: 40 SUSPENSION ORAL at 08:41

## 2018-01-10 RX ADMIN — Medication 10 ML: at 22:55

## 2018-01-10 RX ADMIN — OXYBUTYNIN CHLORIDE 5 MG: 5 TABLET ORAL at 06:15

## 2018-01-10 RX ADMIN — IPRATROPIUM BROMIDE AND ALBUTEROL SULFATE 3 ML: .5; 3 SOLUTION RESPIRATORY (INHALATION) at 07:51

## 2018-01-10 RX ADMIN — BENZONATATE 200 MG: 100 CAPSULE ORAL at 20:35

## 2018-01-10 RX ADMIN — NYSTATIN 500000 UNITS: 100000 SUSPENSION ORAL at 08:42

## 2018-01-10 RX ADMIN — METAXALONE 400 MG: 800 TABLET ORAL at 16:07

## 2018-01-10 NOTE — THERAPY TREATMENT NOTE
Acute Care - Occupational Therapy Treatment Note   Shane     Patient Name: Thurman Mendel Parsons  : 1950  MRN: [id number]  Today's Date: 1/10/2018  Onset of Illness/Injury or Date of Surgery Date: 17  Date of Referral to OT: 17  Referring Physician: Dr. Huber      Admit Date: 2017    Visit Dx:     ICD-10-CM ICD-9-CM   1. Acute exacerbation of chronic obstructive pulmonary disease (COPD) J44.1 491.21   2. Acute on chronic respiratory failure with hypercapnia J96.22 518.84   3. Impaired mobility and ADLs Z74.09 799.89   4. Impaired functional mobility, balance, gait, and endurance Z74.09 V49.89     Patient Active Problem List   Diagnosis   • Anxiety   • Atherosclerotic heart disease of native coronary artery without angina pectoris   • Atrial fibrillation   • Chronic kidney disease, stage III (moderate)   • Steroid-dependent COPD   • Degeneration of cervical intervertebral disc   • Diabetes mellitus   • GERD without esophagitis   • Diverticulosis   • Hemorrhoids   • Hiatal hernia   • Hyperlipidemia   • Hypertension   • Kyphosis, acquired   • Mitral and aortic valve disease   • Phimosis   • Sleep apnea   • Enlarged prostate without lower urinary tract symptoms (luts)   • History of arthritis   • Back pain   • Depression   • Stroke syndrome   • History of ventricular septal defect   • Neck pain   • Impaired mobility and ADLs   • Physical deconditioning   • Acute exacerbation of chronic obstructive pulmonary disease (COPD)             Adult Rehabilitation Note       01/10/18 1044 18 1446 18 1411    Rehab Assessment/Intervention    Discipline occupational therapist  -AH occupational therapist  -SD physical therapy assistant  -RM    Document Type therapy note (daily note)  -AH therapy note (daily note)  -SD therapy note (daily note)  -RM    Subjective Information agree to therapy;complains of;pain  -AH agree to therapy;no complaints  -SD agree to therapy;complains of;dyspnea   -RM    Patient Effort, Rehab Treatment good  -AH good  -SD adequate  -RM    Symptoms Noted During/After Treatment  fatigue  -SD shortness of breath  -RM    Symptoms Noted Comment   O2 SATS >96% during therapy  -RM    Precautions/Limitations fall precautions;oxygen therapy device and L/min  -AH  fall precautions;oxygen therapy device and L/min  -RM    Specific Treatment Considerations   25 lpm at 35%  -RM    Recorded by [AH] Akilah Gutierrez [SD] Kelle Edwards OT [RM] Zenon Rothman, PTA    Vital Signs    Pre SpO2 (%) 96  -AH 94  -SD     O2 Delivery Pre Treatment supplemental O2   4L  -AH supplemental O2   BiPap  -SD     Intra SpO2 (%) 92  -AH 92  -SD     O2 Delivery Intra Treatment supplemental O2   4L  -AH supplemental O2   Mask 13L  -SD     Post SpO2 (%) 95  -AH 97  -SD     O2 Delivery Post Treatment supplemental O2   4L  -AH supplemental O2   Mask 13L  -SD     Pre Patient Position  Supine  -SD     Intra Patient Position  Sitting  -SD     Post Patient Position  Supine  -SD     Recorded by [AH] Akilah Gutierrez [SD] Kelle Edwards OT     Pain Assessment    Pain Assessment 0-10  - 0-10  -SD No/denies pain  -RM    Pain Score 4  -AH 3  -SD     Post Pain Score 4  -AH 3  -SD     Pain Type Chronic pain  - Acute pain  -SD     Pain Location Back  -AH Hip  -SD     Pain Orientation  Left  -SD     Pain Intervention(s) Repositioned;Ambulation/increased activity  - Repositioned  -SD     Response to Interventions tolerated  - tolerated  -SD     Recorded by [AH] Akilah Gutierrez [SD] Kelle Edwards OT [RM] Zenon Rothman PTA    Bed Mobility, Assessment/Treatment    Bed Mobility, Assistive Device head of bed elevated  - bed rails;head of bed elevated  -SD head of bed elevated;bed rails  -RM    Bed Mob, Supine to Sit, [place] conditional independence  -AH conditional independence  -SD supervision required  -RM    Bed Mob, Sit to Supine, [place]  conditional independence  -SD     Bed Mobility, Impairments    strength decreased  -RM    Recorded by [] Akilah Gutierrez [SD] Kelle Edwards, OT [RM] Zenon Rothman PTA    Transfer Assessment/Treatment    Transfers, Sit-Stand [place] supervision required  -  contact guard assist  -RM    Transfers, Stand-Sit [place] supervision required  -  contact guard assist  -RM    Transfers, Sit-Stand-Sit, Assist Device rolling walker  -AH  rolling walker  -RM    Toilet Transfer, [place] supervision required  -      Toilet Transfer, Assistive Device bedside commode without drop arms;rolling walker  -AH      Transfer, Safety Issues   balance decreased during turns;step length decreased  -RM    Transfer, Impairments   strength decreased;impaired balance  -RM    Recorded by [] Akilah Gutierrez  [RM] Zenon Rothman PTA    Gait Assessment/Treatment    Gait, [place] Level   contact guard assist  -RM    Gait, Assistive Device   rolling walker  -RM    Gait, Distance (Feet)   24  -RM    Gait, Gait Pattern Analysis   swing-through gait  -RM    Gait, Gait Deviations   decreased heel strike;forward flexed posture;step length decreased;stride width increased;toe-to-floor clearance decreased  -    Gait, Safety Issues   step length decreased  -RM    Gait, Impairments   strength decreased;impaired balance  -RM    Recorded by   [RM] Zenon Rothman PTA    Functional Mobility    Functional Mobility- Ind. Level contact guard assist  -      Functional Mobility- Device rolling walker  -      Functional Mobility-Distance (Feet) 12  -      Functional Mobility- Safety Issues supplemental O2  -      Recorded by [] Akilah Gutierrez      Upper Body Dressing Assessment/Training    UB Dressing Assess/Train, Clothing Type donning:;pull over  -      UB Dressing Assess/Train, Position sitting  -      UB Dressing Assess/Train, [place] set up required  -      Recorded by [] Akilah Gutierrez      Lower Body Dressing Assessment/Training    LB Dressing Assess/Train, Clothing  Type donning:;doffing:;pants;socks  -      LB Dressing Assess/Train, Position sitting;edge of bed  -      LB Dressing Assess/Train, [place] set up required  -      Recorded by [] Akilah Gutierrez      Therapy Exercises    Bilateral Lower Extremities   --   B LE restorator 2' fwd and 2' bkwd.   -RM    Bilateral Upper Extremity  AROM:;15 reps;shoulder abduction/adduction;shoulder extension/flexion  -SD     BUE Resistance  theraband  -SD     Recorded by  [SD] Kelle Edwards OT [RM] Zenon Rothman PTA    Positioning and Restraints    Pre-Treatment Position in bed  - in bed  -SD in bed  -RM    Post Treatment Position chair  - bed  -SD chair  -RM    In Bed  supine;call light within reach;encouraged to call for assist  -SD     In Chair reclined;call light within reach;encouraged to call for assist;with family/caregiver  -  reclined;call light within reach;encouraged to call for assist;exit alarm on;notified ns  -RM    Recorded by [] Akilah Gutierrez [SD] Kelle Edwards OT [RM] Zenon Rothman, [facility] [place]      01/07/18 8401          Rehab Assessment/Intervention    Discipline physical therapy assistant  -CC      Document Type therapy note (daily note)  -CC      Subjective Information agree to therapy  -CC      Patient Effort, Rehab Treatment good  -CC      Symptoms Noted During/After Treatment shortness of breath  -CC      Symptoms Noted Comment O2 SATS >96% during therapy  -CC      Precautions/Limitations fall precautions;oxygen therapy device and L/min  -CC      Recorded by [CC] Rebeca Sawyer PTA      Pain Assessment    Pain Assessment 0-10  -CC      Pain Score 6  -CC      Post Pain Score 6  -CC      Pain Type Acute pain  -CC      Pain Location Hip  -CC      Pain Orientation Left  -CC      Pain Intervention(s) Repositioned  -CC      Recorded by [CC] Rebeca Sawyer PTA      Vision Assessment/Intervention    Visual Impairment WFL with corrective lenses  -CC      Recorded by [CC] Rebeca Sawyer PTA       Therapy Exercises    Bilateral Lower Extremities AROM:;15 reps;supine;ankle pumps/circles;glut sets;heel slides;hip abduction/adduction;quad sets;SAQ  -CC      Recorded by [CC] Rebeca Sawyer PTA      Positioning and Restraints    Pre-Treatment Position in bed  -CC      Post Treatment Position bed  -CC      In Bed fowlers;encouraged to call for assist  -CC      Recorded by [CC] Rebeca Sawyer PTA        User Key  (r) = Recorded By, (t) = Taken By, (c) = Cosigned By    Initials Name Effective Dates     Akilah Miramonteskiss 10/26/16 -     CC Rebeca Sawyer, PTA 10/26/16 -     RM Zenon Rothman, PTA 10/26/16 -     SD Kelle Butler, OT 06/30/17 -                 OT Goals       01/10/18 1349 01/09/18 1546 01/08/18 1707    Bed Mobility OT LTG    Bed Mobility OT LTG, Date Established   01/08/18  -SD    Bed Mobility OT LTG, Time to Achieve   2 wks  -SD    Bed Mobility OT LTG, Activity Type   all bed mobility  -SD    Bed Mobility OT LTG, [place] Level   conditional independence  -SD    Bed Mobility OT LTG, Date Goal Reviewed  01/09/18  -SD     Bed Mobility OT LTG, Outcome  goal met  -SD goal ongoing  -SD    Bed Mobility OT LTG, Reason Goal Not Met   goals revised this date  -SD    Transfer Training OT LTG    Transfer Training OT LTG, Date Established   01/08/18  -SD    Transfer Training OT LTG, Time to Achieve   2 wks  -SD    Transfer Training OT LTG, Activity Type   sit to stand/stand to sit;toilet  -SD    Transfer Training OT LTG, [place] Level   supervision required  -SD    Transfer Training OT LTG, Assist Device   walker, rolling  -SD    Transfer Training OT LTG, Date Goal Reviewed 01/10/18  -AH 01/09/18  -SD     Transfer Training OT LTG, Outcome goal met  - goal ongoing  -SD goal ongoing  -SD    Transfer Training OT LTG, Reason Goal Not Met   goals revised this date  -SD    Strength OT LTG    Strength Goal OT LTG, Date Established   01/08/18  -SD    Strength Goal OT LTG, Time to Achieve   2 wks   -SD    Strength Goal OT LTG, Measure to Achieve   Patient will perform 25 reps UB graded ther ex maintaining O2 >90 in order to increase strength and endurance.   -SD    Strength Goal OT LTG, Date Goal Reviewed 01/10/18  -AH 01/09/18  -SD     Strength Goal OT LTG, Outcome goal ongoing  -AH goal ongoing  -SD goal ongoing  -SD    Strength Goal OT LTG, Reason Goal Not Met   goals revised this date  -SD    LB Dressing OT LTG    LB Dressing Goal OT LTG, Date Established   01/08/18  -SD    LB Dressing Goal OT LTG, Time to Achieve   2 wks  -SD    LB Dressing Goal OT LTG, [place] Level   contact guard assist  -SD    LB Dressing Goal OT LTG, Date Goal Reviewed 01/10/18  -AH 01/09/18  -SD     LB Dressing Goal OT LTG, Outcome goal met  - goal ongoing  -SD goal ongoing  -SD    LB Dressing Goal OT LTG, Reason Goal Not Met   goals revised this date  -SD    UB Dressing OT LTG    UB Dressing Goal OT LTG, Date Established   01/08/18  -SD    UB Dressing Goal OT LTG, Time to Achieve   2 wks  -SD    UB Dressing Goal OT LTG, [place] Level   set up required  -SD    UB Dressing Goal OT LTG, Date Goal Reviewed 01/10/18  -AH 01/09/18  -SD     UB Dressing Goal OT LTG, Outcome goal met  - goal ongoing  -SD goal ongoing  -SD      01/05/18 1707 01/04/18 1550 01/03/18 1315    Bed Mobility OT LTG    Bed Mobility OT LTG, Date Goal Reviewed   01/03/18  -SD    Bed Mobility OT LTG, Outcome   goal met  -SD    Transfer Training OT LTG    Transfer Training OT LTG, Date Goal Reviewed  01/04/18  -SD 01/03/18  -SD    Transfer Training OT LTG, Outcome  goal met  -SD goal ongoing  -SD    Strength OT LTG    Strength Goal OT LTG, Date Goal Reviewed 01/05/18  -SD 01/04/18  -SD 01/03/18  -SD    Strength Goal OT LTG, Outcome goal ongoing  -SD goal ongoing  -SD goal ongoing  -SD    LB Dressing OT LTG    LB Dressing Goal OT LTG, Date Goal Reviewed 01/05/18  -SD 01/04/18  -SD 01/03/18  -SD    LB Dressing Goal OT LTG, Outcome goal ongoing  -SD goal  ongoing  -SD goal ongoing  -SD    UB Dressing OT LTG    UB Dressing Goal OT LTG, Date Goal Reviewed 01/05/18  -SD 01/04/18  -SD 01/03/18  -SD    UB Dressing Goal OT LTG, Outcome goal ongoing  -SD goal ongoing  -SD goal ongoing  -SD      01/02/18 1519 12/28/17 1404       Bed Mobility OT LTG    Bed Mobility OT LTG, Date Established  12/28/17  -     Bed Mobility OT LTG, Time to Achieve  by discharge  -     Bed Mobility OT LTG, Activity Type  supine to sit/sit to supine  -     Bed Mobility OT LTG, [place] Level  minimum assist (75% patient effort)  -     Bed Mobility OT LTG, Date Goal Reviewed 01/02/18  -      Bed Mobility OT LTG, Outcome goal ongoing  - goal ongoing  -     Transfer Training OT LTG    Transfer Training OT LTG, Date Established  12/28/17  -     Transfer Training OT LTG, Time to Achieve  by discharge  -     Transfer Training OT LTG, Activity Type  sit to stand/stand to sit;bed to chair /chair to bed  -     Transfer Training OT LTG, [place] Level  minimum assist (75% patient effort)  -     Transfer Training OT LTG, Date Goal Reviewed 01/02/18  -      Transfer Training OT LTG, Outcome goal ongoing  - goal ongoing  -     Strength OT LTG    Strength Goal OT LTG, Date Established  12/28/17  -     Strength Goal OT LTG, Time to Achieve  by discharge  -     Strength Goal OT LTG, Functional Goal  Pt will perform 15 reps UE ex progressing from AROM to strengthening as pt tolerates  -     Strength Goal OT LTG, Date Goal Reviewed 01/02/18  -      Strength Goal OT LTG, Outcome goal ongoing  - goal ongoing  -     LB Dressing OT LTG    LB Dressing Goal OT LTG, Date Established  12/28/17  -     LB Dressing Goal OT LTG, Time to Achieve  by discharge  -     LB Dressing Goal OT LTG, [place] Level  moderate assist (50% patient effort)  -     LB Dressing Goal OT LTG, Date Goal Reviewed 01/02/18  -      LB Dressing Goal OT LTG, Outcome goal ongoing  - goal  ongoing  -     UB Dressing OT LTG    UB Dressing Goal OT LTG, Date Established  12/28/17  -     UB Dressing Goal OT LTG, Time to Achieve  by discharge  -     UB Dressing Goal OT LTG, [place] Level  minimum assist (75% patient effort)  -     UB Dressing Goal OT LTG, Date Goal Reviewed 01/02/18  -      UB Dressing Goal OT LTG, Outcome goal ongoing  - goal ongoing  -       User Key  (r) = Recorded By, (t) = Taken By, (c) = Cosigned By    Initials Name Provider Type    SOWMYA Gutierrez Occupational Therapist    TONY Edwards OT Occupational Therapist          Occupational Therapy Education     Title: PT OT SLP Therapies (Active)     Topic: Occupational Therapy (Active)     Point: ADL training (Done)    Description: Instruct learner(s) on proper safety adaptation and remediation techniques during self care or transfers.   Instruct in proper use of assistive devices.    Learning Progress Summary    Learner Readiness Method Response Comment Documented by Status   Patient Acceptance E VU Pt educated on pacing and energy conservation during self care tasks.  01/10/18 1344 Done    Acceptance E VU Benefit of therapy and continued need for OT services. SD 01/08/18 1713 Done    Acceptance E VU Energy conservation techniques during bathing tasks to prevent dyspnea utilizing PLB techniques. SD 01/05/18 1711 Done    Acceptance E VU Safety/sequencing during functional transfers SD 01/04/18 1553 Done    Acceptance E VU PLB exercises during toileting task utilizing urinal in standing EOB to prevent dyspnea. SD 01/03/18 1319 Done    Acceptance E VU benefit of working with therapy to regain strength  01/02/18 1518 Done    Acceptance E NR  CH 01/02/18 0334 Active    Acceptance E VU Role of OT/POC  12/28/17 1401 Done   Family Acceptance E VU  AE 01/01/18 0103 Done    Acceptance E VU Role of OT/POC  12/28/17 1401 Done               Point: Home exercise program (Done)    Description: Instruct learner(s) on  appropriate technique for monitoring, assisting and/or progressing therapeutic exercises/activities.    Learning Progress Summary    Learner Readiness Method Response Comment Documented by Status   Patient Acceptance E VU Benefit of exercise to improve breathing and functional performance. SD 01/09/18 1553 Done    Acceptance E VU benefit of working with therapy to regain strength  01/02/18 1518 Done               Point: Precautions (Done)    Description: Instruct learner(s) on prescribed precautions during self-care and functional transfers.    Learning Progress Summary    Learner Readiness Method Response Comment Documented by Status   Patient Acceptance E VU Pt educated on pacing and energy conservation during self care tasks.  01/10/18 1344 Done                      User Key     Initials Effective Dates Name Provider Type Discipline     10/26/16 -  Akilah Gutierrez Occupational Therapist OT    SD 06/30/17 -  Kelle Edwards OT Occupational Therapist OT     08/31/17 -  Mayda Nguyen, RN Registered Nurse Nurse     01/20/17 -  Maryjane Subramanian, RN Registered Nurse Nurse                  OT Recommendation and Plan  Anticipated Discharge Disposition: inpatient rehabilitation facility  Planned Therapy Interventions: ADL retraining, bed mobility training, strengthening, transfer training  Therapy Frequency: 3-5 times/wk  Plan of Care Review  Plan Of Care Reviewed With: patient  Progress: improving  Outcome Summary/Follow up Plan: Pt received supine in bed on 4L O2 via nc.  Pt able to participate in ther activities including toilet transfers, functional mobility and dressing tasks. Pts sats stayed above 92% during activities.  Pt was left sitting reclined in his chair with call light within reach.        Outcome Measures       01/10/18 1044 01/09/18 1609 01/09/18 1446    How much help from another person do you currently need...    Turning from your back to your side while in flat bed without using bedrails?   4  -JR     Moving from lying on back to sitting on the side of a flat bed without bedrails?  4  -JR     Moving to and from a bed to a chair (including a wheelchair)?  3  -JR     Standing up from a chair using your arms (e.g., wheelchair, bedside chair)?  3  -JR     Climbing 3-5 steps with a railing?  2  -JR     To walk in hospital room?  3  -JR     AM-PAC 6 Clicks Score  19  -JR     How much help from another is currently needed...    Putting on and taking off regular lower body clothing? 3  -  3  -SD    Bathing (including washing, rinsing, and drying) 3  -AH  3  -SD    Toileting (which includes using toilet bed pan or urinal) 4  -  3  -SD    Putting on and taking off regular upper body clothing 4  -  3  -SD    Taking care of personal grooming (such as brushing teeth) 3  -  3  -SD    Eating meals 4  -  4  -SD    Score 21  -AH  19  -SD    Functional Assessment    Outcome Measure Options AM-PAC 6 Clicks Daily Activity (OT)  -[facility] 6 Clicks Basic Mobility (PT)  -[facility]-PAC 6 Clicks Daily Activity (OT)  -SD      01/08/18 1623          How much help from another is currently needed...    Putting on and taking off regular lower body clothing? 3  -SD      Bathing (including washing, rinsing, and drying) 3  -SD      Toileting (which includes using toilet bed pan or urinal) 3  -SD      Putting on and taking off regular upper body clothing 3  -SD      Taking care of personal grooming (such as brushing teeth) 3  -SD      Eating meals 4  -SD      Score 19  -SD      Functional Assessment    Outcome Measure Options [facility] 6 Clicks Daily Activity (OT)  -SD        User Key  (r) = Recorded By, (t) = Taken By, (c) = Cosigned By    Initials Name Provider Type     Akilah Gutierrez Occupational Therapist    JR Kaylee Kurtz, PT Physical Therapist    TONY Edwards OT Occupational Therapist           Time Calculation:         Time Calculation- OT       01/10/18 1357          Time Calculation- OT    OT Start Time 1044  -       Total Timed Code Minutes- OT 30 minute(s)  -      OT Received On 01/10/18  -      OT Goal Re-Cert Due Date 01/18/18  -        User Key  (r) = Recorded By, (t) = Taken By, (c) = Cosigned By    Initials Name Provider Type     Akilah Gutierrez Occupational Therapist           Therapy Charges for Today     Code Description Service Date Service Provider Modifiers Qty    [id number]  OT SELF CARE/MGMT/TRAIN EA 15 MIN 1/10/2018 Akilah Gutierrez GO 1    [id number]  OT THERAPEUTIC ACT EA 15 MIN 1/10/2018 Akilah Gutierrez GO 1               Akilah Gutierrez  1/10/2018

## 2018-01-10 NOTE — PLAN OF CARE
Problem: Patient Care Overview (Adult)  Goal: Plan of Care Review  Outcome: Ongoing (interventions implemented as appropriate)   01/10/18 0416   Coping/Psychosocial Response Interventions   Plan Of Care Reviewed With patient   Patient Care Overview   Progress improving   Outcome Evaluation   Outcome Summary/Follow up Plan Pts lungs sound improved tonight, and he appears to have improved strength. Pt rested very well this evening, and wore his BiPap throughout the night.

## 2018-01-10 NOTE — PROGRESS NOTES
[facility]    Name:  Thurman Mendel Parsons   Age:  67 y.o.  Sex:  male  :  1950  MRN:  [id number]   Visit Number:  [id number]  Admission Date:  2017  Date Of Service:  01/10/18  Primary Care Physician:  Miguel Rojas MD     LOS: 17 days :  Patient Care Team:  Miguel Rojas MD as PCP - General  Miguel Rojas MD as PCP - Family Medicine:    Chief Complaint:      Acute on Chronic Resp Failure    Subjective / Interval History:     I have reviewed labs/imaging/records from this hospitalization, including ER staff and admitting/attending physicians H/P's and progress notes to establish a comprehensive understanding of this patient's clinical hospital course, as well as to establish a transition of care appropriately.    Patient is a 67-year-old male who has had a rather prolonged course of hospitalization secondary to an acute on chronic respiratory failure with hypoxia and hypercapnia.  He has required continued high pressure ventilatory support utilizing his already established BiPAP.  He is also required increased oxygen demand in excess of 6 L via nasal cannula when not on BiPAP.    He has continued to work with physical therapy and occupational therapy.  Denies any active chest pain or any hemoptysis.  No dysphagia.  Denies any hematuria or dysuria.  No bright red blood or black tarry stools.  No reports of palpitations, syncope or vertigo.  Denies any nausea or vomiting, tolerating all by mouth intake.    Review of Systems:     General ROS: Patient denies any fevers, chills or loss of consciousness.  Respiratory ROS: Chronic cough with shortness of breath.  Cardiovascular ROS: Denies chest pain or palpitations. No history of exertional chest pain.  Gastrointestinal ROS: Denies nausea and vomiting. Denies any abdominal pain. No diarrhea.  Neurological ROS: Generalized weakness. No loss of function consciousness. Denies any numbness. Denies neck  pain.  Dermatological ROS: Denies any redness or pruritis.    Vital Signs:    Temp:  [97.6 °F (36.4 °C)-98.3 °F (36.8 °C)] 98 °F (36.7 °C)  Heart Rate:  [74-92] 92  Resp:  [16-28] 18  BP: (108-125)/(52-78) 115/58    Intake and output:    I/O last 3 completed shifts:  In: 480 [P.O.:480]  Out: 2150 [Urine:2150]  I/O this shift:  In: -   Out: 300 [Urine:300]    Physical Examination:    General Appearance:  Alert and cooperative, not in any acute distress.Chronically ill-appearing male.     Head:  Atraumatic and normocephalic, without obvious abnormality.   Eyes:          PERRLA, conjunctivae and sclerae normal, no Icterus. No pallor. Extra-occular movements are within normal limits.   Neck: Supple, trachea midline, no thyromegaly, no carotid bruit.   Lungs:   Chest shape is barrel.  Audible air exchange to all lung fields.  Rhonchus breath sounds throughout all lung fields.  Referred upper airway with light clearing with heavy cough.  Inspiratory and expiratory wheezes bilaterally.  Prolonged respiratory phase bilaterally.     Heart:  Normal S1 and S2, no murmur, no gallop, no rub. No JVD   Abdomen:   Normal bowel sounds, no masses, no organomegaly. Soft       non-tender, non-distended, no guarding, no rebound tenderness.   Extremities: Moves all extremities well, no edema, no cyanosis, no            Clubbing.  Thin, trail build.     Skin: No bleeding, bruising or rash.  Age related atrophy of the skin.     Neurologic: Awake, alert and oriented times 3. Moves all 4 extremities equally.   Laboratory results:      Results from last 7 days  Lab Units 01/10/18  0635 01/09/18  0551 01/08/18  0528  01/05/18  0552   SODIUM mmol/L 142 142 143  < > 140   POTASSIUM mmol/L 4.5 4.1 3.8  < > 4.0   CHLORIDE mmol/L 104 105 104  < > 104   CO2 mmol/L 34.0* 34.0* 34.0*  < > 33.0*   BUN mg/dL 33* 25* 28*  < > 36*   CREATININE mg/dL 1.20 0.90 1.00  < > 0.90   CALCIUM mg/dL 9.2 8.7 8.9  < > 9.0   BILIRUBIN mg/dL  --   --   --   --  0.6    ALK PHOS U/L  --   --   --   --  57   ALT (SGPT) U/L  --   --   --   --  58   AST (SGOT) U/L  --   --   --   --  52*   GLUCOSE mg/dL 117* 111* 84  < > 83   < > = values in this interval not displayed.    Results from last 7 days  Lab Units 01/10/18  0635 01/09/18  0551 01/08/18  0528   WBC 10*3/mm3 12.19* 12.72* 12.16*   HEMOGLOBIN g/dL 11.5* 10.6* 10.7*   HEMATOCRIT % 36.5* 33.1* 33.7*   PLATELETS 10*3/mm3 223 192 205                       I have reviewed the patient's laboratory results.    Radiology results:    Imaging Results (last 24 hours)     ** No results found for the last 24 hours. **          I have reviewed the patient's radiology reports.    Medication Review:     I have reviewed the patients active and prn medications.         Assessment:  Principal Problem:    Acute on chronic respiratory failure with hypoxia and hypercapnia  Active Problems:    Impaired mobility and ADLs    Physical deconditioning    Atherosclerotic heart disease of native coronary artery without angina pectoris    Atrial fibrillation    Chronic kidney disease, stage III (moderate)    GERD without esophagitis    Hyperlipidemia    Hypertension    Enlarged prostate without lower urinary tract symptoms (luts)        Plan:  Patient will continue physical therapy and occupational therapy.  His oxygen demand is been reduced throughout course of the day to now less than 5 L via nasal cannula to maintain oxygen saturations in an acceptable range.  He is required no continuous positive pressure ventilatory support.  Will discuss with case management concerning skilled nursing facility placement.  I discussed plan of care in detail with patient as well as his nurse.  I've answered all the questions.  He verbalized understanding of the plan of care and agrees.          I have spent a total of 35 mins in direct pt care time today.    Sahil Gallego DO  01/10/18  4:37 PM

## 2018-01-10 NOTE — PLAN OF CARE
Problem: Patient Care Overview (Adult)  Goal: Plan of Care Review  Outcome: Ongoing (interventions implemented as appropriate)   01/10/18 1349   Coping/Psychosocial Response Interventions   Plan Of Care Reviewed With patient   Patient Care Overview   Progress improving   Outcome Evaluation   Outcome Summary/Follow up Plan Pt received supine in bed on 4L O2 via nc. Pt able to participate in ther activities including toilet transfers, functional mobility and dressing tasks. Pts sats stayed above 92% during activities. Pt was left sitting reclined in his chair with call light within reach.       Problem: Inpatient Occupational Therapy  Goal: Transfer Training Goal 1 LTG- OT  Outcome: Outcome(s) achieved Date Met: 01/10/18   01/08/18 1707 01/10/18 1349   Transfer Training OT LTG   Transfer Training OT LTG, Date Established 01/08/18 --    Transfer Training OT LTG, Time to Achieve 2 wks --    Transfer Training OT LTG, Activity Type sit to stand/stand to sit;toilet --    Transfer Training OT LTG, [place] Level supervision required --    Transfer Training OT LTG, Assist Device walker, rolling --    Transfer Training OT LTG, Date Goal Reviewed --  01/10/18   Transfer Training OT LTG, Outcome --  goal met   Transfer Training OT LTG, Reason Goal Not Met goals revised this date --      Goal: Strength Goal LTG- OT  Outcome: Ongoing (interventions implemented as appropriate)   12/28/17 1404 01/08/18 1707 01/10/18 1349   Strength OT LTG   Strength Goal OT LTG, Date Established --  01/08/18 --    Strength Goal OT LTG, Time to Achieve --  2 wks --    Strength Goal OT LTG, Measure to Achieve --  Patient will perform 25 reps UB graded ther ex maintaining O2 >90 in order to increase strength and endurance.  --    Strength Goal OT LTG, Functional Goal Pt will perform 15 reps UE ex progressing from AROM to strengthening as pt tolerates --  --    Strength Goal OT LTG, Date Goal Reviewed --  --  01/10/18   Strength Goal OT LTG,  Outcome --  --  goal ongoing   Strength Goal OT LTG, Reason Goal Not Met --  goals revised this date --      Goal: LB Dressing Goal LTG- OT  Outcome: Outcome(s) achieved Date Met: 01/10/18   01/08/18 1707 01/10/18 1349   LB Dressing OT LTG   LB Dressing Goal OT LTG, Date Established 01/08/18 --    LB Dressing Goal OT LTG, Time to Achieve 2 wks --    LB Dressing Goal OT LTG, [place] Level contact guard assist --    LB Dressing Goal OT LTG, Date Goal Reviewed --  01/10/18   LB Dressing Goal OT LTG, Outcome --  goal met   LB Dressing Goal OT LTG, Reason Goal Not Met goals revised this date --      Goal: UB Dressing Goal LTG- OT  Outcome: Outcome(s) achieved Date Met: 01/10/18

## 2018-01-10 NOTE — THERAPY TREATMENT NOTE
[facility] - [facility] Therapy Treatment [facility]  [facility]     Patient Name: Thurman Mendel Parsons  : 1950  MRN: [id number]  Today's Date: 1/10/2018  Onset of Illness/Injury or Date of Surgery Date: 17  Date of Referral to PT: 17  Referring Physician: Dr. Huber    Admit Date: 2017    Visit Dx:    ICD-10-CM ICD-9-CM   1. Acute exacerbation of chronic obstructive pulmonary disease (COPD) J44.1 491.21   2. Acute on chronic respiratory failure with hypercapnia J96.22 518.84   3. Impaired mobility and ADLs Z74.09 799.89   4. Impaired functional mobility, balance, gait, and endurance Z74.09 V49.89     Patient Active Problem List   Diagnosis   • Anxiety   • Atherosclerotic heart disease of native coronary artery without angina pectoris   • Atrial fibrillation   • Chronic kidney disease, stage III (moderate)   • Steroid-dependent COPD   • Degeneration of cervical intervertebral disc   • Diabetes mellitus   • GERD without esophagitis   • Diverticulosis   • Hemorrhoids   • Hiatal hernia   • Hyperlipidemia   • Hypertension   • Kyphosis, acquired   • Mitral and aortic valve disease   • Phimosis   • Sleep apnea   • Enlarged prostate without lower urinary tract symptoms (luts)   • History of arthritis   • Back pain   • Depression   • Stroke syndrome   • History of ventricular septal defect   • Neck pain   • Impaired mobility and ADLs   • Physical deconditioning   • Acute exacerbation of chronic obstructive pulmonary disease (COPD)               Adult Rehabilitation Note       01/10/18 1308 01/10/18 1044 18 1446    Rehab Assessment/Intervention    Discipline physical therapy assistant  -RM occupational therapist  -AH occupational therapist  -SD    Document Type therapy note (daily note)  -RM therapy note (daily note)  - therapy note (daily note)  -SD    Subjective Information agree to therapy;complains of;fatigue  -RM agree to therapy;complains of;pain  - agree to therapy;no complaints  -SD     Patient Effort, Rehab Treatment adequate  -RM good  -AH good  -SD    Symptoms Noted During/After Treatment   fatigue  -SD    Precautions/Limitations fall precautions;oxygen therapy device and L/min  -RM fall precautions;oxygen therapy device and L/min  -AH     Specific Treatment Considerations 4 lpm pnc   -RM      Recorded by [RM] Zenon Rothman PTA [] Akilah Gutierrez [SD] Kelle Edwards, OT    Vital Signs    Pre SpO2 (%) 97  -RM 96  -AH 94  -SD    O2 Delivery Pre Treatment supplemental O2  -RM supplemental O2   4L  -AH supplemental O2   BiPap  -SD    Intra SpO2 (%) 94  -RM 92  -AH 92  -SD    O2 Delivery Intra Treatment supplemental O2  -RM supplemental O2   4L  -AH supplemental O2   Mask 13L  -SD    Post SpO2 (%) 96  -RM 95  -AH 97  -SD    O2 Delivery Post Treatment supplemental O2  -RM supplemental O2   4L  -AH supplemental O2   Mask 13L  -SD    Pre Patient Position Sitting  -RM  Supine  -SD    Intra Patient Position Standing  -RM  Sitting  -SD    Post Patient Position Supine  -RM  Supine  -SD    Recorded by [RM] Zenon Rothman PTA [] Akilah Gutierrez [SD] Kelle Edwards, OT    Pain Assessment    Pain Assessment No/denies pain  -RM 0-10  -AH 0-10  -SD    Pain Score  4  -AH 3  -SD    Post Pain Score  4  -AH 3  -SD    Pain Type  Chronic pain  -AH Acute pain  -SD    Pain Location  Back  -AH Hip  -SD    Pain Orientation   Left  -SD    Pain Intervention(s)  Repositioned;Ambulation/increased activity  -AH Repositioned  -SD    Response to Interventions  tolerated  -AH tolerated  -SD    Recorded by [RM] Zenon Rothman PTA [] Akilah Gutierrez [SD] Kelle Edwards, OT    Bed Mobility, Assessment/Treatment    Bed Mobility, Assistive Device  head of bed elevated  -AH bed rails;head of bed elevated  -SD    Bed Mob, Supine to Sit, [place]  conditional independence  - conditional independence  -SD    Bed Mob, Sit to Supine, [place] independent  -  conditional independence  -SD    Recorded by [] Zenon GREGORIO  Stephan, HELADIO [] Akilah Gutierrez [SD] Kelle Velasquezing, OT    Transfer Assessment/Treatment    Transfers, Sit-Stand [place] supervision required  - supervision required  -     Transfers, Stand-Sit [place] supervision required  - supervision required  -     Transfers, Sit-Stand-Sit, Assist Device rolling walker  -RM rolling walker  -AH     Toilet Transfer, [place]  supervision required  -     Toilet Transfer, Assistive Device  bedside commode without drop arms;rolling walker  -AH     Recorded by [] Zenon Rothman PTA [] Akilah Gutierrez     Gait Assessment/Treatment    Gait, [place] Level contact guard assist  -RM      Gait, Assistive Device rolling walker  -RM      Gait, Distance (Feet) 126  -RM      Gait, Gait Pattern Analysis swing-through gait  -      Gait, Gait Deviations pati decreased;step length decreased;stride width increased;forward flexed posture  -RM      Gait, Safety Issues step length decreased;supplemental O2  -RM      Gait, Impairments strength decreased;impaired balance  -RM      Recorded by [] Zenon Rothman PTA      Functional Mobility    Functional Mobility- Ind. Level  contact guard assist  -     Functional Mobility- Device  rolling walker  -     Functional Mobility-Distance (Feet)  12  -AH     Functional Mobility- Safety Issues  supplemental O2  -AH     Recorded by  [] Akilah Gutierrez     Upper Body Dressing Assessment/Training    UB Dressing Assess/Train, Clothing Type  donning:;pull over  -     UB Dressing Assess/Train, Position  sitting  -     UB Dressing Assess/Train, [place]  set up required  -     Recorded by  [] Akilah Gutierrez     Lower Body Dressing Assessment/Training    LB Dressing Assess/Train, Clothing Type  donning:;doffing:;pants;socks  -     LB Dressing Assess/Train, Position  sitting;edge of bed  -     LB Dressing Assess/Train, [place]  set up required  -     Recorded by  [] Akilah Gutierrez     Therapy  Exercises    Bilateral Upper Extremity   AROM:;15 reps;shoulder abduction/adduction;shoulder extension/flexion  -SD    BUE Resistance   theraband  -SD    Recorded by   [SD] Kelle Edwards OT    Positioning and Restraints    Pre-Treatment Position sitting in chair/recliner  -RM in bed  -AH in bed  -SD    Post Treatment Position bed  -RM chair  -AH bed  -SD    In Bed supine;call light within reach;encouraged to call for assist;notified nsg  -RM  supine;call light within reach;encouraged to call for assist  -SD    In Chair  reclined;call light within reach;encouraged to call for assist;with family/caregiver  -     Recorded by [RM] Zenon Rothman PTA [] Akilah Gutierrez [SD] Kelle Edwards OT      01/08/18 1411 01/07/18 1551       Rehab Assessment/Intervention    Discipline physical therapy assistant  - physical therapy assistant  -     Document Type therapy note (daily note)  - therapy note (daily note)  -     Subjective Information agree to therapy;complains of;dyspnea  - agree to therapy  -CC     Patient Effort, Rehab Treatment adequate  -RM good  -CC     Symptoms Noted During/After Treatment shortness of breath  -RM shortness of breath  -CC     Symptoms Noted Comment O2 SATS >96% during therapy  -RM O2 SATS >96% during therapy  -CC     Precautions/Limitations fall precautions;oxygen therapy device and L/min  -RM fall precautions;oxygen therapy device and L/min  -CC     Specific Treatment Considerations 25 lpm at 35%  -RM      Recorded by [RM] Zenon Rothman PTA [CC] Rebeca Sawyer PTA     Pain Assessment    Pain Assessment No/denies pain  - 0-10  -CC     Pain Score  6  -CC     Post Pain Score  6  -CC     Pain Type  Acute pain  -     Pain Location  Hip  -     Pain Orientation  Left  -     Pain Intervention(s)  Repositioned  -CC     Recorded by [RM] Zenon Rothman PTA [CC] Rebeca Sawyer PTA     Vision Assessment/Intervention    Visual Impairment  WFL with corrective lenses  -      Recorded by  [CC] Rebeca Sawyer PTA     Bed Mobility, Assessment/Treatment    Bed Mobility, Assistive Device head of bed elevated;bed rails  -RM      Bed Mob, Supine to Sit, [place] supervision required  -RM      Bed Mobility, Impairments strength decreased  -RM      Recorded by [RM] Zenon Rothman PTA      Transfer Assessment/Treatment    Transfers, Sit-Stand [place] contact guard assist  -RM      Transfers, Stand-Sit [place] contact guard assist  -RM      Transfers, Sit-Stand-Sit, Assist Device rolling walker  -RM      Transfer, Safety Issues balance decreased during turns;step length decreased  -RM      Transfer, Impairments strength decreased;impaired balance  -RM      Recorded by [RM] Zenon Rothman PTA      Gait Assessment/Treatment    Gait, [place] Level contact guard assist  -RM      Gait, Assistive Device rolling walker  -RM      Gait, Distance (Feet) 24  -RM      Gait, Gait Pattern Analysis swing-through gait  -RM      Gait, Gait Deviations decreased heel strike;forward flexed posture;step length decreased;stride width increased;toe-to-floor clearance decreased  -RM      Gait, Safety Issues step length decreased  -RM      Gait, Impairments strength decreased;impaired balance  -RM      Recorded by [RM] Zenon Rothman PTA      Therapy Exercises    Bilateral Lower Extremities --   B LE restorator 2' fwd and 2' bkwd.   -RM AROM:;15 reps;supine;ankle pumps/circles;glut sets;heel slides;hip abduction/adduction;quad sets;SAQ  -CC     Recorded by [RM] Zenon Rothman PTA [CC] Rebeca Sawyer PTA     Positioning and Restraints    Pre-Treatment Position in bed  -RM in bed  -CC     Post Treatment Position chair  -RM bed  -CC     In Bed  fowlers;encouraged to call for assist  -CC     In Chair reclined;call light within reach;encouraged to call for assist;exit alarm on;notified nsg  -RM      Recorded by [RM] Zenon Rothman PTA [CC] Rebeca Sawyer PTA       User Key  (r) =  Recorded By, (t) = Taken By, (c) = Cosigned By    Initials Name Effective Dates     Akilah Gutirerez 10/26/16 -     CC Rebeca Sawyer, PTA 10/26/16 -     RM Zenon Rothman, PTA 10/26/16 -     SD Klele Edwards, OT 06/30/17 -                 IP PT Goals       01/10/18 1508 01/09/18 1845 01/08/18 1735    Bed Mobility PT LTG    Bed Mobility PT LTG, Date Established  01/09/18  -JR     Bed Mobility PT LTG, Time to Achieve  2 wks  -JR     Bed Mobility PT LTG, Activity Type  all bed mobility  -JR     Bed Mobility PT LTG, [place] Level  conditional independence  -JR     Bed Mobility PT LTG, Additional Goal  with oxygen saturation levels at or above 90%  -JR     Bed Mobility PT LTG, Outcome goal partially met  -RM goal revised  -JR goal partially met  -RM    Transfer Training PT LTG    Transfer Training PT LTG, Date Established  01/09/18  -JR     Transfer Training PT LTG, Time to Achieve  2 wks  -JR     Transfer Training PT LTG, Activity Type  sit to stand/stand to sit;bed to chair /chair to bed  -JR     Transfer Training PT LTG, [place] Level  supervision required  -JR     Transfer Training PT LTG, Assist Device  walker, rolling  -JR     Transfer Training PT LTG, Additional Goal  with oxygen saturation levels at or above 90%  -JR     Transfer Training PT LTG, Outcome goal met  -RM goal revised  -JR goal ongoing  -RM    Gait Training PT LTG    Gait Training Goal PT LTG, Date Established  01/09/18  -JR     Gait Training Goal PT LTG, Time to Achieve  2 wks  -JR     Gait Training Goal PT LTG, [place] Level  contact guard assist  -JR     Gait Training Goal PT LTG, Assist Device  walker, rolling  -JR     Gait Training Goal PT LTG, Distance to Achieve  100 feet   -JR     Gait Training Goal PT LTG, Additional Goal  with oxygen saturation level of at least 90%  -JR     Gait Training Goal PT LTG, Outcome goal met  -RM goal revised  -JR goal ongoing  -RM      01/07/18 1551 01/06/18 1441 01/05/18 1747    Bed Mobility  PT LTG    Bed Mobility PT LTG, Outcome goal partially met  -CC goal partially met  -CK goal partially met  -CK    Transfer Training PT LTG    Transfer Training PT LTG, Outcome goal ongoing  -CC goal ongoing  -CK goal ongoing  -CK    Gait Training PT LTG    Gait Training Goal PT LTG, Outcome goal ongoing  -CC goal ongoing  -CK goal ongoing  -CK      01/04/18 1532 01/03/18 1329 01/02/18 1551    Bed Mobility PT LTG    Bed Mobility PT LTG, Outcome goal partially met  -LM goal ongoing  -LM goal ongoing  -RM    Transfer Training PT LTG    Transfer Training PT LTG, Outcome goal partially met  -LM goal ongoing  -LM goal ongoing  -RM    Gait Training PT LTG    Gait Training Goal PT LTG, Outcome goal ongoing  -LM goal ongoing  -LM       12/31/17 1532 12/28/17 1711       Bed Mobility PT LTG    Bed Mobility PT LTG, Date Established  12/28/17  -JR     Bed Mobility PT LTG, Time to Achieve  2 wks  -JR     Bed Mobility PT LTG, Activity Type  all bed mobility  -JR     Bed Mobility PT LTG, [place] Level  minimum assist (75% patient effort)  -JR     Bed Mobility PT LTG, Outcome goal ongoing  -JR      Transfer Training PT LTG    Transfer Training PT LTG, Date Established  12/28/17  -JR     Transfer Training PT LTG, Time to Achieve  2 wks  -JR     Transfer Training PT LTG, Activity Type  sit to stand/stand to sit;bed to chair /chair to bed  -JR     Transfer Training PT LTG, [place] Level  minimum assist (75% patient effort)  -JR     Transfer Training PT LTG, Assist Device  walker, rolling  -JR     Transfer Training PT LTG, Outcome goal ongoing  -JR      Gait Training PT LTG    Gait Training Goal PT LTG, Date Established  12/28/17  -JR     Gait Training Goal PT LTG, Time to Achieve  2 wks  -JR     Gait Training Goal PT LTG, [place] Level  minimum assist (75% patient effort)  -JR     Gait Training Goal PT LTG, Assist Device  walker, rolling  -JR     Gait Training Goal PT LTG, Distance to Achieve  25  -JR     Gait Training  Goal PT LTG, Outcome goal ongoing  -JR        User Key  (r) = Recorded By, (t) = Taken By, (c) = Cosigned By    Initials Name Provider Type    JR Kaylee Kurtz, PT Physical Therapist    LM Xochitl Hernandez, PT Physical Therapist    CC Rebeca Sawyer, PTA Physical Therapy Assistant    CK Jessie Davenport, PTA Physical Therapy Assistant    RM Zenon Rothman, PTA Physical Therapy Assistant          Physical Therapy Education     Title: PT OT SLP Therapies (Active)     Topic: Physical Therapy (Done)     Point: Mobility training (Done)    Learning Progress Summary    Learner Readiness Method Response Comment Documented by Status   Patient Acceptance E,D VU,NR Pursed lip breathing with activity  01/10/18 1508 Done    Acceptance E VU Importance of mobility to recovery JR 01/09/18 1841 Done    Acceptance E,D VU,NR Pursed lip breathing with activity.  01/08/18 1735 Done    Acceptance E VU  CK 01/06/18 1441 Done    Acceptance E VU   01/05/18 1747 Done    Acceptance E VU Ther ex for HEP; safety techniques for transfers.  01/04/18 1532 Done    Acceptance E VU Purpose of PT; PLB/pacing to alleviate SOA with activity.  01/03/18 1328 Done    Acceptance E,D VU,NR   01/02/18 1550 Done    Acceptance E NR   01/02/18 0334 Active    Acceptance E VU Importance of mobility to recovery  12/28/17 1706 Done   Family Acceptance E VU Purpose of PT; PLB/pacing to alleviate SOA with activity.  01/03/18 1328 Done               Point: Home exercise program (Done)    Learning Progress Summary    Learner Readiness Method Response Comment Documented by Status   Patient Acceptance E,D VU,NR Pursed lip breathing with activity.  01/08/18 1735 Done    Acceptance E VU  CK 01/06/18 1441 Done    Acceptance E VU  CK 01/05/18 1747 Done    Acceptance E VU Ther ex for HEP; safety techniques for transfers.  01/04/18 1532 Done    Acceptance E VU Purpose of PT; PLB/pacing to alleviate SOA with activity.  01/03/18 1328 Done    Acceptance E,D  VU,NR   01/02/18 1550 Done    Acceptance E NR   01/02/18 0334 Active    Acceptance E VU instructed on the importance of exercises to improving mobility  12/31/17 1523 Done   Family Acceptance E VU Purpose of PT; PLB/pacing to alleviate SOA with activity.  01/03/18 1328 Done    Acceptance E VU instructed on the importance of exercises to improving mobility  12/31/17 1523 Done               Point: Body mechanics (Done)    Learning Progress Summary    Learner Readiness Method Response Comment Documented by Status   Patient Acceptance E VU  CK 01/06/18 1441 Done               Point: Precautions (Done)    Learning Progress Summary    Learner Readiness Method Response Comment Documented by Status   Patient Acceptance E VU   01/06/18 1441 Done    Acceptance E VU   01/05/18 1747 Done    Acceptance E VU Ther ex for HEP; safety techniques for transfers.  01/04/18 1532 Done    Acceptance E VU Purpose of PT; PLB/pacing to alleviate SOA with activity.  01/03/18 1328 Done   Family Acceptance E VU Purpose of PT; PLB/pacing to alleviate SOA with activity.  01/03/18 1328 Done                      User Key     Initials Effective Dates Name Provider Type Discipline     10/26/16 -  Kaylee Kurtz, PT Physical Therapist PT     10/26/16 -  Xochitl Hernandez, PT Physical Therapist PT     10/26/16 -  Jessie Davenport, PTA Physical Therapy Assistant PT     10/26/16 -  Zenon Rothman, PTA Physical Therapy Assistant PT     08/31/17 -  Mayad Nguyen, RN Registered Nurse Nurse                    PT Recommendation and Plan  Anticipated Discharge Disposition: inpatient rehabilitation facility  Planned Therapy Interventions: balance training, strengthening, bed mobility training, transfer training, gait training, patient/family education  PT Frequency: daily  Plan of Care Review  Plan Of Care Reviewed With: patient  Progress: improving  Outcome Summary/Follow up Plan: Pt presented with significantly improved O2  saturations with activity as well as increased ambulation distance.  Pt does present with continued deconditioning. See flowsheet for details.            Outcome Measures       01/10/18 1044 01/09/18 1609 01/09/18 1446    How much help from another person do you currently need...    Turning from your back to your side while in flat bed without using bedrails?  4  -JR     Moving from lying on back to sitting on the side of a flat bed without bedrails?  4  -JR     Moving to and from a bed to a chair (including a wheelchair)?  3  -JR     Standing up from a chair using your arms (e.g., wheelchair, bedside chair)?  3  -JR     Climbing 3-5 steps with a railing?  2  -JR     To walk in hospital room?  3  -JR     AM-PAC 6 Clicks Score  19  -JR     How much help from another is currently needed...    Putting on and taking off regular lower body clothing? 3  -AH  3  -SD    Bathing (including washing, rinsing, and drying) 3  -AH  3  -SD    Toileting (which includes using toilet bed pan or urinal) 4  -AH  3  -SD    Putting on and taking off regular upper body clothing 4  -AH  3  -SD    Taking care of personal grooming (such as brushing teeth) 3  -AH  3  -SD    Eating meals 4  -AH  4  -SD    Score 21  -AH  19  -SD    Functional Assessment    Outcome Measure Options AM-PAC 6 Clicks Daily Activity (OT)  -AH AM-PAC 6 Clicks Basic Mobility (PT)  -JR AM-PAC 6 Clicks Daily Activity (OT)  -SD      01/08/18 1623          How much help from another is currently needed...    Putting on and taking off regular lower body clothing? 3  -SD      Bathing (including washing, rinsing, and drying) 3  -SD      Toileting (which includes using toilet bed pan or urinal) 3  -SD      Putting on and taking off regular upper body clothing 3  -SD      Taking care of personal grooming (such as brushing teeth) 3  -SD      Eating meals 4  -SD      Score 19  -SD      Functional Assessment    Outcome Measure Options AM-PAC 6 Clicks Daily Activity (OT)  -SD         User Key  (r) = Recorded By, (t) = Taken By, (c) = Cosigned By    Initials Name Provider Type     Akilah Gutierrez Occupational Therapist    JR Kaylee Kurtz, PT Physical Therapist    TONY Edwards, OT Occupational Therapist           Time Calculation:         PT Charges       01/10/18 1514          Time Calculation    Start Time 1308  -RM      PT Received On 01/10/18  -RM      PT Goal Re-Cert Due Date 01/19/18  -RM      Time Calculation- PT    Total Timed Code Minutes- PT 14 minute(s)  -RM        User Key  (r) = Recorded By, (t) = Taken By, (c) = Cosigned By    Initials Name Provider Type     Zenon Rothman PTA Physical Therapy Assistant          Therapy Charges for Today     Code Description Service Date Service Provider Modifiers Qty    [id number] HC GAIT TRAINING EA 15 MIN 1/10/2018 Zenon Rothman PTA GP 1          PT G-Codes  Outcome Measure Options: AM-PAC 6 Clicks Daily Activity (OT)    Zenon Rothman PTA  1/10/2018

## 2018-01-10 NOTE — PROGRESS NOTES
Continued Stay Note  CARLOS Shane     Patient Name: Thurman Mendel Parsons  MRN: [id number]  Today's Date: 1/10/2018    Admit Date: 12/24/2017          Discharge Plan       01/10/18 1509    Case Management/Social Work Plan    Additional Comments Pt oxygen level is 4 LNC Called and faxed placment Packet to [facility]               Discharge Codes     None        Expected Discharge Date and Time     Expected Discharge Date Expected Discharge Time    [place] 10, 2018             Danielle Saldana

## 2018-01-10 NOTE — PLAN OF CARE
Problem: Patient Care Overview (Adult)  Goal: Plan of Care Review  Outcome: Ongoing (interventions implemented as appropriate)   01/10/18 1508   Coping/Psychosocial Response Interventions   Plan Of Care Reviewed With patient   Outcome Evaluation   Outcome Summary/Follow up Plan Pt presented with significantly improved O2 saturations with activity as well as increased ambulation distance. Pt does present with continued deconditioning. See flowsheet for details.        Problem: Inpatient Physical Therapy  Goal: Bed Mobility Goal LTG- PT  Outcome: Ongoing (interventions implemented as appropriate)   01/09/18 1845 01/10/18 1508   Bed Mobility PT LTG   Bed Mobility PT LTG, Date Established 01/09/18 --    Bed Mobility PT LTG, Time to Achieve 2 wks --    Bed Mobility PT LTG, Activity Type all bed mobility --    Bed Mobility PT LTG, [place] Level conditional independence --    Bed Mobility PT LTG, Additional Goal with oxygen saturation levels at or above 90% --    Bed Mobility PT LTG, Outcome --  goal partially met     Goal: Transfer Training Goal 1 LTG- PT  Outcome: Outcome(s) achieved Date Met: 01/10/18   01/09/18 1845 01/10/18 1508   Transfer Training PT LTG   Transfer Training PT LTG, Date Established 01/09/18 --    Transfer Training PT LTG, Time to Achieve 2 wks --    Transfer Training PT LTG, Activity Type sit to stand/stand to sit;bed to chair /chair to bed --    Transfer Training PT LTG, [place] Level supervision required --    Transfer Training PT LTG, Assist Device walker, rolling --    Transfer Training PT LTG, Additional Goal with oxygen saturation levels at or above 90% --    Transfer Training PT LTG, Outcome --  goal met     Goal: Gait Training Goal LTG- PT  Outcome: Outcome(s) achieved Date Met: 01/10/18   01/09/18 1845 01/10/18 1508   Gait Training PT LTG   Gait Training Goal PT LTG, Date Established 01/09/18 --    Gait Training Goal PT LTG, Time to Achieve 2 wks --    Gait Training Goal PT LTG,  [place] Level contact guard assist --    Gait Training Goal PT LTG, Assist Device walker, rolling --    Gait Training Goal PT LTG, Distance to Achieve 100 feet  --    Gait Training Goal PT LTG, Additional Goal with oxygen saturation level of at least 90% --    Gait Training Goal PT LTG, Outcome --  goal met

## 2018-01-11 VITALS
RESPIRATION RATE: 17 BRPM | OXYGEN SATURATION: 98 % | TEMPERATURE: 98 F | HEART RATE: 90 BPM | DIASTOLIC BLOOD PRESSURE: 72 MMHG | HEIGHT: 67 IN | BODY MASS INDEX: 21.85 KG/M2 | SYSTOLIC BLOOD PRESSURE: 135 MMHG | WEIGHT: 139.2 LBS

## 2018-01-11 PROCEDURE — 99239 HOSP IP/OBS DSCHRG MGMT >30: CPT | Performed by: FAMILY MEDICINE

## 2018-01-11 PROCEDURE — 94799 UNLISTED PULMONARY SVC/PX: CPT

## 2018-01-11 PROCEDURE — 25010000002 METHYLPREDNISOLONE PER 40 MG: Performed by: INTERNAL MEDICINE

## 2018-01-11 RX ORDER — TRAMADOL HYDROCHLORIDE 50 MG/1
50 TABLET ORAL EVERY 6 HOURS PRN
Qty: 20 TABLET | Refills: 0 | Status: SHIPPED | OUTPATIENT
Start: 2018-01-11 | End: 2018-01-16

## 2018-01-11 RX ORDER — TRAZODONE HYDROCHLORIDE 50 MG/1
50 TABLET ORAL NIGHTLY
Start: 2018-01-11 | End: 2018-02-19

## 2018-01-11 RX ORDER — CLONAZEPAM 0.5 MG/1
0.25 TABLET ORAL NIGHTLY PRN
Qty: 5 TABLET | Refills: 0 | Status: SHIPPED | OUTPATIENT
Start: 2018-01-11 | End: 2018-05-22

## 2018-01-11 RX ORDER — TAMSULOSIN HYDROCHLORIDE 0.4 MG/1
0.4 CAPSULE ORAL NIGHTLY
Qty: 30 CAPSULE
Start: 2018-01-11 | End: 2018-05-22

## 2018-01-11 RX ORDER — LOSARTAN POTASSIUM 50 MG/1
50 TABLET ORAL DAILY
Start: 2018-01-12 | End: 2018-02-19

## 2018-01-11 RX ORDER — DULOXETIN HYDROCHLORIDE 30 MG/1
30 CAPSULE, DELAYED RELEASE ORAL DAILY
Start: 2018-01-12 | End: 2018-02-19

## 2018-01-11 RX ORDER — MEGESTROL ACETATE 40 MG/ML
800 SUSPENSION ORAL DAILY
Qty: 240 ML | Refills: 0 | Status: SHIPPED | OUTPATIENT
Start: 2018-01-12 | End: 2018-02-19

## 2018-01-11 RX ORDER — SENNA AND DOCUSATE SODIUM 50; 8.6 MG/1; MG/1
2 TABLET, FILM COATED ORAL NIGHTLY
Start: 2018-01-11 | End: 2018-02-19

## 2018-01-11 RX ORDER — MIRTAZAPINE 15 MG/1
15 TABLET, FILM COATED ORAL NIGHTLY
Start: 2018-01-11 | End: 2018-02-19

## 2018-01-11 RX ORDER — GUAIFENESIN AND DEXTROMETHORPHAN HYDROBROMIDE 600; 30 MG/1; MG/1
2 TABLET, EXTENDED RELEASE ORAL 2 TIMES DAILY
Start: 2018-01-11 | End: 2018-02-19

## 2018-01-11 RX ORDER — METAXALONE 400 MG/1
400 TABLET ORAL 3 TIMES DAILY
Start: 2018-01-11 | End: 2018-02-19

## 2018-01-11 RX ADMIN — LOSARTAN POTASSIUM 50 MG: 50 TABLET, FILM COATED ORAL at 09:16

## 2018-01-11 RX ADMIN — FINASTERIDE 5 MG: 5 TABLET, FILM COATED ORAL at 09:16

## 2018-01-11 RX ADMIN — DULOXETINE HYDROCHLORIDE 30 MG: 30 CAPSULE, DELAYED RELEASE ORAL at 09:15

## 2018-01-11 RX ADMIN — PANTOPRAZOLE SODIUM 40 MG: 40 TABLET, DELAYED RELEASE ORAL at 15:22

## 2018-01-11 RX ADMIN — BUDESONIDE 0.5 MG: 0.5 INHALANT RESPIRATORY (INHALATION) at 07:11

## 2018-01-11 RX ADMIN — CARVEDILOL 3.12 MG: 3.12 TABLET, FILM COATED ORAL at 09:16

## 2018-01-11 RX ADMIN — ATORVASTATIN CALCIUM 40 MG: 40 TABLET, FILM COATED ORAL at 09:16

## 2018-01-11 RX ADMIN — NYSTATIN 500000 UNITS: 100000 SUSPENSION ORAL at 12:10

## 2018-01-11 RX ADMIN — METAXALONE 400 MG: 800 TABLET ORAL at 15:22

## 2018-01-11 RX ADMIN — PANTOPRAZOLE SODIUM 40 MG: 40 TABLET, DELAYED RELEASE ORAL at 06:05

## 2018-01-11 RX ADMIN — AMLODIPINE BESYLATE 5 MG: 5 TABLET ORAL at 09:16

## 2018-01-11 RX ADMIN — METAXALONE 400 MG: 800 TABLET ORAL at 09:15

## 2018-01-11 RX ADMIN — IPRATROPIUM BROMIDE AND ALBUTEROL SULFATE 3 ML: .5; 3 SOLUTION RESPIRATORY (INHALATION) at 07:11

## 2018-01-11 RX ADMIN — MEGESTROL ACETATE 800 MG: 40 SUSPENSION ORAL at 09:15

## 2018-01-11 RX ADMIN — Medication 1 EACH: at 09:00

## 2018-01-11 RX ADMIN — POLYETHYLENE GLYCOL 3350 17 G: 17 POWDER, FOR SOLUTION ORAL at 09:14

## 2018-01-11 RX ADMIN — CLOPIDOGREL BISULFATE 75 MG: 75 TABLET ORAL at 09:16

## 2018-01-11 RX ADMIN — NYSTATIN 500000 UNITS: 100000 SUSPENSION ORAL at 09:14

## 2018-01-11 RX ADMIN — OXYBUTYNIN CHLORIDE 5 MG: 5 TABLET ORAL at 06:05

## 2018-01-11 RX ADMIN — Medication 10 ML: at 05:30

## 2018-01-11 RX ADMIN — GUAIFENESIN AND DEXTROMETHORPHAN HYDROBROMIDE 2 TABLET: 600; 30 TABLET, EXTENDED RELEASE ORAL at 09:15

## 2018-01-11 RX ADMIN — METHYLPREDNISOLONE SODIUM SUCCINATE 40 MG: 40 INJECTION, POWDER, FOR SOLUTION INTRAMUSCULAR; INTRAVENOUS at 05:30

## 2018-01-11 RX ADMIN — FLUTICASONE PROPIONATE 1 SPRAY: 50 SPRAY, METERED NASAL at 09:19

## 2018-01-11 NOTE — THERAPY DISCHARGE NOTE
Acute Care - Occupational Therapy Discharge Summary  [facility]     Patient Name: Thurman Mendel Parsons  : 1950  MRN: [id number]    Today's Date: 2018  Onset of Illness/Injury or Date of Surgery Date: 17    Date of Referral to OT: 17  Referring Physician: Dr. Huber      Admit Date: 2017        OT Recommendation and Plan    Visit Dx:    ICD-10-CM ICD-9-CM   1. Acute exacerbation of chronic obstructive pulmonary disease (COPD) J44.1 491.21   2. Acute on chronic respiratory failure with hypercapnia J96.22 518.84   3. Impaired mobility and ADLs Z74.09 799.89   4. Impaired functional mobility, balance, gait, and endurance Z74.09 V49.89                     OT Goals       01/10/18 1349 18 1546 18 1707    Bed Mobility OT LTG    Bed Mobility OT LTG, Date Established   18  -SD    Bed Mobility OT LTG, Time to Achieve   2 wks  -SD    Bed Mobility OT LTG, Activity Type   all bed mobility  -SD    Bed Mobility OT LTG, Hominy Level   conditional independence  -SD    Bed Mobility OT LTG, Date Goal Reviewed  18  -SD     Bed Mobility OT LTG, Outcome  goal met  -SD goal ongoing  -SD    Bed Mobility OT LTG, Reason Goal Not Met   goals revised this date  -SD    Transfer Training OT LTG    Transfer Training OT LTG, Date Established   18  -SD    Transfer Training OT LTG, Time to Achieve   2 wks  -SD    Transfer Training OT LTG, Activity Type   sit to stand/stand to sit;toilet  -SD    Transfer Training OT LTG, Hominy Level   supervision required  -SD    Transfer Training OT LTG, Assist Device   walker, rolling  -SD    Transfer Training OT LTG, Date Goal Reviewed 01/10/18  -AH 18  -SD     Transfer Training OT LTG, Outcome goal met  - goal ongoing  -SD goal ongoing  -SD    Transfer Training OT LTG, Reason Goal Not Met   goals revised this date  -SD    Strength OT LTG    Strength Goal OT LTG, Date Established   18  -SD    Strength Goal OT LTG, Time to  Achieve   2 wks  -SD    Strength Goal OT LTG, Measure to Achieve   Patient will perform 25 reps UB graded ther ex maintaining O2 >90 in order to increase strength and endurance.   -SD    Strength Goal OT LTG, Date Goal Reviewed 01/10/18  -AH 01/09/18  -SD     Strength Goal OT LTG, Outcome goal ongoing  -AH goal ongoing  -SD goal ongoing  -SD    Strength Goal OT LTG, Reason Goal Not Met   goals revised this date  -SD    LB Dressing OT LTG    LB Dressing Goal OT LTG, Date Established   01/08/18  -SD    LB Dressing Goal OT LTG, Time to Achieve   2 wks  -SD    LB Dressing Goal OT LTG, Falcon Level   contact guard assist  -SD    LB Dressing Goal OT LTG, Date Goal Reviewed 01/10/18  -AH 01/09/18  -SD     LB Dressing Goal OT LTG, Outcome goal met  -AH goal ongoing  -SD goal ongoing  -SD    LB Dressing Goal OT LTG, Reason Goal Not Met   goals revised this date  -SD    UB Dressing OT LTG    UB Dressing Goal OT LTG, Date Established   01/08/18  -SD    UB Dressing Goal OT LTG, Time to Achieve   2 wks  -SD    UB Dressing Goal OT LTG, Falcon Level   set up required  -SD    UB Dressing Goal OT LTG, Date Goal Reviewed 01/10/18  -AH 01/09/18  -SD     UB Dressing Goal OT LTG, Outcome goal met  - goal ongoing  -SD goal ongoing  -SD      01/05/18 1707 01/04/18 1550 01/03/18 1315    Bed Mobility OT LTG    Bed Mobility OT LTG, Date Goal Reviewed   01/03/18  -SD    Bed Mobility OT LTG, Outcome   goal met  -SD    Transfer Training OT LTG    Transfer Training OT LTG, Date Goal Reviewed  01/04/18  -SD 01/03/18  -SD    Transfer Training OT LTG, Outcome  goal met  -SD goal ongoing  -SD    Strength OT LTG    Strength Goal OT LTG, Date Goal Reviewed 01/05/18  -SD 01/04/18  -SD 01/03/18  -SD    Strength Goal OT LTG, Outcome goal ongoing  -SD goal ongoing  -SD goal ongoing  -SD    LB Dressing OT LTG    LB Dressing Goal OT LTG, Date Goal Reviewed 01/05/18  -SD 01/04/18  -SD 01/03/18  -SD    LB Dressing Goal OT LTG, Outcome goal  ongoing  -SD goal ongoing  -SD goal ongoing  -SD    UB Dressing OT LTG    UB Dressing Goal OT LTG, Date Goal Reviewed 01/05/18  -SD 01/04/18  -SD 01/03/18  -SD    UB Dressing Goal OT LTG, Outcome goal ongoing  -SD goal ongoing  -SD goal ongoing  -SD      01/02/18 1519          Bed Mobility OT LTG    Bed Mobility OT LTG, Date Goal Reviewed 01/02/18  -      Bed Mobility OT LTG, Outcome goal ongoing  -      Transfer Training OT LTG    Transfer Training OT LTG, Date Goal Reviewed 01/02/18  -      Transfer Training OT LTG, Outcome goal ongoing  -      Strength OT LTG    Strength Goal OT LTG, Date Goal Reviewed 01/02/18  -      Strength Goal OT LTG, Outcome goal ongoing  -      LB Dressing OT LTG    LB Dressing Goal OT LTG, Date Goal Reviewed 01/02/18  -AH      LB Dressing Goal OT LTG, Outcome goal ongoing  -      UB Dressing OT LTG    UB Dressing Goal OT LTG, Date Goal Reviewed 01/02/18  -      UB Dressing Goal OT LTG, Outcome goal ongoing  -        User Key  (r) = Recorded By, (t) = Taken By, (c) = Cosigned By    Initials Name Provider Type    SOWMYA Gutierrez Occupational Therapist    TONY Edwards OT Occupational Therapist                Outcome Measures       01/10/18 1044 01/09/18 1609 01/09/18 1446    How much help from another person do you currently need...    Turning from your back to your side while in flat bed without using bedrails?  4  -JR     Moving from lying on back to sitting on the side of a flat bed without bedrails?  4  -JR     Moving to and from a bed to a chair (including a wheelchair)?  3  -JR     Standing up from a chair using your arms (e.g., wheelchair, bedside chair)?  3  -JR     Climbing 3-5 steps with a railing?  2  -JR     To walk in hospital room?  3  -JR     AM-PAC 6 Clicks Score  19  -JR     How much help from another is currently needed...    Putting on and taking off regular lower body clothing? 3  -  3  -SD    Bathing (including washing, rinsing, and drying) 3   -AH  3  -SD    Toileting (which includes using toilet bed pan or urinal) 4  -AH  3  -SD    Putting on and taking off regular upper body clothing 4  -AH  3  -SD    Taking care of personal grooming (such as brushing teeth) 3  -AH  3  -SD    Eating meals 4  -AH  4  -SD    Score 21  -AH  19  -SD    Functional Assessment    Outcome Measure Options AM-PAC 6 Clicks Daily Activity (OT)  -AH AM-PAC 6 Clicks Basic Mobility (PT)  -JR AM-PAC 6 Clicks Daily Activity (OT)  -SD      01/08/18 1623          How much help from another is currently needed...    Putting on and taking off regular lower body clothing? 3  -SD      Bathing (including washing, rinsing, and drying) 3  -SD      Toileting (which includes using toilet bed pan or urinal) 3  -SD      Putting on and taking off regular upper body clothing 3  -SD      Taking care of personal grooming (such as brushing teeth) 3  -SD      Eating meals 4  -SD      Score 19  -SD      Functional Assessment    Outcome Measure Options AM-PAC 6 Clicks Daily Activity (OT)  -SD        User Key  (r) = Recorded By, (t) = Taken By, (c) = Cosigned By    Initials Name Provider Type     Akilah Gutierrez Occupational Therapist    JR Kaylee Kurtz, PT Physical Therapist    TONY Edwards, OT Occupational Therapist          Therapy Charges for Today     Code Description Service Date Service Provider Modifiers Qty    [id number]  OT SELF CARE/MGMT/TRAIN EA 15 MIN 1/10/2018 Akilah Gutierrez GO 1    [id number]  OT THERAPEUTIC ACT EA 15 MIN 1/10/2018 Akilah Gutierrez GO 1          OT Discharge Summary  Anticipated Discharge Disposition: inpatient rehabilitation facility  Reason for Discharge: Discharge from facility  Outcomes Achieved: Patient able to partially acheive established goals  Discharge Destination: Inpatient rehabilitation facility      Akilah Gutierrez  1/11/2018

## 2018-01-11 NOTE — PLAN OF CARE
Problem: Patient Care Overview (Adult)  Goal: Plan of Care Review  Outcome: Ongoing (interventions implemented as appropriate)   01/10/18 2100   Coping/Psychosocial Response Interventions   Plan Of Care Reviewed With patient   Patient Care Overview   Progress improving   Outcome Evaluation   Outcome Summary/Follow up Plan pleasant patient with complaint of pain-medications given per physician's orders-possible discharge 01--monitor labs and continue to monitor patient     Goal: Adult Individualization and Mutuality  Outcome: Ongoing (interventions implemented as appropriate)    Goal: Discharge Needs Assessment  Outcome: Ongoing (interventions implemented as appropriate)      Problem: COPD, Chronic Bronchitis/Emphysema (Adult)  Goal: Signs and Symptoms of Listed Potential Problems Will be Absent or Manageable (COPD, Chronic Bronchitis/Emphysema)  Outcome: Ongoing (interventions implemented as appropriate)      Problem: Respiratory Insufficiency (Adult)  Goal: Acid/Base Balance  Outcome: Ongoing (interventions implemented as appropriate)    Goal: Effective Ventilation  Outcome: Ongoing (interventions implemented as appropriate)      Problem: Fall Risk (Adult)  Goal: Absence of Falls  Outcome: Ongoing (interventions implemented as appropriate)

## 2018-01-11 NOTE — DISCHARGE SUMMARY
[facility]   DISCHARGE SUMMARY      Name:  Thurman Mendel Parsons   Age:  67 y.o.  Sex:  male  :  1950  MRN:  [id number]   Visit Number:  [id number]  Primary Care Physician:  Miguel Rojas MD  Date of Discharge:  2018  Admission Date:  2017      Discharge Diagnosis:         Principal Problem:    Acute on chronic respiratory failure with hypoxia and hypercapnia  Active Problems:    Impaired mobility and ADLs    Physical deconditioning    Atherosclerotic heart disease of native coronary artery without angina pectoris    Atrial fibrillation    Chronic kidney disease, stage III (moderate)    GERD without esophagitis    Hyperlipidemia    Hypertension    Enlarged prostate without lower urinary tract symptoms (luts)      Presenting Problem/History of Present Illness:    Acute exacerbation of chronic obstructive pulmonary disease (COPD) [J44.1]  Acute exacerbation of chronic obstructive pulmonary disease (COPD) [J44.1]     Consults:     Consults     Date and Time Order Name Status Description    2017 1715 Inpatient Consult to Pulmonology            Procedures Performed:             History of presenting illness:    Mr. Pena is a 67 years old gentleman with history of chronic respiratory failure secondary to COPD, obstructive sleep apnea requiring BiPAP and hypertension who was transported to the emergency room via EMS due to progressive worsening shortness of breath.     Per patient, his symptoms started 3 days ago with shortness of breath, cough, expectoration of yellowish sputum, decreased appetite, nausea and increasing weakness.  He denies associated fever, chills, chest pain, abdominal pain or symptoms to suggest new focal neurological deficits.  As his sister went to pick him up for a family [employer] gathering, he was in respiratory distress, lethargic and confused; therefore she contacted 911 and patient was transferred to the emergency room by ambulance for  further evaluation and management.  Of note, patient was hospitalized at this facility in September 2017 for similar symptoms attributed to right lower lobe pneumonia and COPD exacerbation and acute on chronic hypoxic hypercapnic respiratory failure.  Since then he remained in stable condition, however he admits to not being adherent with CPAP use.  3 weeks ago he was diagnosed with upper respiratory infection for which received by mouth (? Erythromycin).     Upon arrival to the emergency room, initial vital signs included temperature 99.1°F, heart rate 112/m, respiratory 28/m, blood pressure 191/77, O2 sats 94% on nasal cannula; initial ABG was significant for the 7.23, PCO2 81, PO2 173 on 80% nonrebreather.  BiPAP was applied after which a repeat ABG revealed pH 7.33, PCO2 63 and PO2 113.  Workup revealed troponin 0.079, cardiac BNP 1230, glucose 102, BUN 22, creatinine 1.2 [baseline 1.1], otherwise CMP and CBC were unremarkable.  EKG revealed right bundle branch block which was noted on priors.  Chest x-ray was negative for acute changes.  IV Solu-Medrol, Rocephin and azithromycin were administered as well as bronchodilator nebulizers.  Patient gradually improved symptomatically and remained hemodynamically stable with O2 sats in the 90s.  Accordingly, he was transferred to [facility] floor with telemetry on the hospitalist service for further evaluation.    Hospital Course:    I have reviewed labs/imaging/records from this hospitalization, including ER staff and admitting/attending physicians H/P's and progress notes to establish a comprehensive understanding of this patient's clinical hospital course, as well as to establish a transition of care appropriately.    67-year-old  male with chronic hypoxic respiratory failure secondary to COPD, PEDRO and hypertension.  Patient was admitted with bronchopneumonia which was treated with appropriate antibiotics.  Cultures have been negative.  He also has been  treated with steroids for COPD exacerbation.  His oxygen requirements have been extremely high.  Dr. Lopez is following, and has been adjusting his oxygen.  Goal for the patient was to be less than 6 L nasal cannula oxygen supplementation, and then he can go to a local rehabilitation facility.  He denies any chest pressure, nausea, vomiting, or pain.  He continues to have dyspnea on exertion, but this is chronic in nature.  He is on Pulmicort, Mucinex, DuoNeb, Solu-Medrol, Tessalon, Tussionex, BiPAP and oscillating positive expiratory pressure.  He has improved very slowly    Patient has had a rather prolonged course of hospitalization secondary to an acute on chronic respiratory failure with hypoxia and hypercapnia.  He has required continued high pressure ventilatory support utilizing his already established BiPAP.  He is also required increased oxygen demand in excess of 6 L via nasal cannula when not on BiPAP, until approximately 48 hours ago, at which time he could tolerate 4 L nasal cannula to maintain normal oxygen saturations and no increased work of breathing.     He has continued to work with physical therapy and occupational therapy.  Denies any active chest pain or any hemoptysis.  No dysphagia.  Denies any hematuria or dysuria.  No bright red blood or black tarry stools.  No reports of palpitations, syncope or vertigo.  Denies any nausea or vomiting, tolerating all by mouth intake.    Patient will be transitioned to [facility] and rehabilitation facility today.  He'll be followed there by an attending provider, as well as with his pulmonologist Dr. Lopez.  He will be continued on his BiPAP at current settings to be used at night while sleeping more during the daytime when napping.  He will continue with 4 L nasal cannula oxygen supplementation at all other times.  Patient will be continued on sliding scale insulin coverage based on every before meals and at bedtime fingersticks.    I have  discussed the plan of care in detail with patient today, he verbalizes understanding and agrees.  I've discussed the case in detail with [facility] and rehabilitation [facility] representative as well as with case management today.  I've answered all of patient's questions today.    Vital Signs:    Temp:  [97.7 °F (36.5 °C)-98.1 °F (36.7 °C)] 98 °F (36.7 °C)  Heart Rate:  [] 90  Resp:  [16-22] 17  BP: (104-142)/(58-80) 135/72    Physical Exam:    General Appearance:  Alert and cooperative, not in any acute distress.  Chronically ill-appearing gentleman, looks older than stated age.     Head:  Atraumatic and normocephalic, without obvious abnormality.   Eyes:          PERRLA, conjunctivae and sclerae normal, no Icterus. No pallor. Extra-occular movements are within normal limits.   Ears:  Ears appear intact with no abnormalities noted.   Throat: No oral lesions, no thrush, oral mucosa moist.  Neck changes of tobacco use.     Neck: Supple, trachea midline, no thyromegaly, no carotid bruit.   Back:   No kyphoscoliosis present. No tenderness to palpation,   range of motion normal.   Lungs:   Chest shape is barrel. Breath sounds heard bilaterally to all lung fields.  Rhonchus, referred upper airway congestion cleared slightly with heavy cough.  End expiratory wheezes bilaterally, with prolonged expiratory phase. No Pleural rub or bronchial breathing.   Heart:  Normal S1 and S2, no murmur, no gallop, no rub. No JVD.   Abdomen:   Normal bowel sounds, no masses, no organomegaly. Soft     non-tender, non-distended, no guarding, no rebound tenderness.   Extremities: Moves all extremities well, no edema, no cyanosis, no clubbing.  Thin, frail build.     Pulses: Pulses palpable and equal bilaterally.   Skin: No bleeding, bruising or rash.  Age related atrophy of the skin.     Lymph nodes: No palpable adenopathy.   Neurologic: Alert and oriented x 3. Moves all four limbs equally. No tremors. No facial asymetry.        Pertinent Lab Results:       Results from last 7 days  Lab Units 01/10/18  0635 01/09/18  0551 01/08/18  0528  01/05/18  0552   SODIUM mmol/L 142 142 143  < > 140   POTASSIUM mmol/L 4.5 4.1 3.8  < > 4.0   CHLORIDE mmol/L 104 105 104  < > 104   CO2 mmol/L 34.0* 34.0* 34.0*  < > 33.0*   BUN mg/dL 33* 25* 28*  < > 36*   CREATININE mg/dL 1.20 0.90 1.00  < > 0.90   CALCIUM mg/dL 9.2 8.7 8.9  < > 9.0   BILIRUBIN mg/dL  --   --   --   --  0.6   ALK PHOS U/L  --   --   --   --  57   ALT (SGPT) U/L  --   --   --   --  58   AST (SGOT) U/L  --   --   --   --  52*   GLUCOSE mg/dL 117* 111* 84  < > 83   < > = values in this interval not displayed.    Results from last 7 days  Lab Units 01/10/18  0635 01/09/18  0551 01/08/18  0528   WBC 10*3/mm3 12.19* 12.72* 12.16*   HEMOGLOBIN g/dL 11.5* 10.6* 10.7*   HEMATOCRIT % 36.5* 33.1* 33.7*   PLATELETS 10*3/mm3 223 192 205                                   Invalid input(s): USDES,  BLOODU, NITRITITE, BACT, EP  Pain Management Panel     There is no flowsheet data to display.              Pertinent Radiology Results:    Imaging Results (all)     Procedure Component Value Units Date/Time    XR Chest 1 View [[id number]] Collected:  12/25/17 0739     Updated:  12/25/17 0742    Narrative:       PROCEDURE: XR CHEST 1 VW-     HISTORY: SOA  triage protocol.     COMPARISON: September 14, 2017.      FINDINGS:    . The heart is normal in size. The mediastinum is  unremarkable. A presumed stimulator overlies the right thorax which  obscures part of the right lung. There are probably new right lung  opacities worrisome for pneumonia. The lungs are somewhat hyperinflated  consistent with COPD. There is no pneumothorax. The bony thorax in  intact.           Impression:       COPD.     Right lung opacities worrisome for pneumonia. Recommend follow-up  radiograph is.        This report was finalized on 12/25/2017 7:40 AM by Jorge Luis Hanna MD.    XR Chest 1 View [[id number]] Collected:  12/27/17 0706      Updated:  12/27/17 1634    Narrative:       PROCEDURE: XR CHEST 1 VW-     HISTORY: SOA; J44.1-Chronic obstructive pulmonary disease with (acute)  exacerbation; J96.22-Acute and chronic respiratory failure with  hypercapnia     COMPARISON: December 24, 2017.     FINDINGS: The heart is normal in size. The mediastinum is unremarkable.  There are chronic interstitial lung changes. Bibasilar opacities are  felt to reflect atelectasis although an underlying pneumonia is not  entirely excluded. There is no pneumothorax.  There are no acute osseous  abnormalities. A nerve stimulator generator overlies the right chest  wall.       Impression:       Chronic interstitial lung changes with bibasilar opacities  felt to reflect atelectasis although an underlying pneumonia is not  entirely excluded.     Continued followup is recommended.     This report was finalized on 12/27/2017 4:32 PM by Richelle Patel M.D..    CT Chest Pulmonary Embolism With Contrast [[id number]] Collected:  12/29/17 1618     Updated:  12/29/17 1622    Narrative:       PROCEDURE: CT CHEST PULMONARY EMBOLISM W CONTRAST-     HISTORY: dyspnea; J44.1-Chronic obstructive pulmonary disease with  (acute) exacerbation; J96.22-Acute and chronic respiratory failure with  hypercapnia; Z74.09-Other reduced mobility; Z74.09-Other reduced  mobility     COMPARISON: None .     TECHNIQUE: Multiple axial CT images were obtained from the thoracic  inlet through the upper abdomen following the administration of Isovue  300 per the CT PE protocol. Coronal and oblique 3D MIP images were  reconstructed from the original axial data set.      FINDINGS: There are no filling defects within the pulmonary arteries to  suggest an embolus. The thoracic aorta is normal in caliber with no  evidence of dissection. The heart is normal in size, however the left  ventricle appears dilated. There are no pleural or pericardial  effusions. There is no adenopathy. Lung windows reveal patchy  bilateral  airspace disease consistent with a pneumonia. Within the visualized  upper abdomen there are are stones within the neck of the gallbladder.  The upper abdomen is otherwise unremarkable. Bone windows reveal no  acute osseous abnormalities.       Impression:       1. No evidence of pulmonary embolus or dissection.   2. Patchy bilateral airspace disease consistent with a pneumonia.  3. Gallstones.             416.86 mGy.cm          This study was performed with techniques to keep radiation doses as low  as reasonably achievable (ALARA). Individualized dose reduction  techniques using automated exposure control or adjustment of mA and/or  kV according to the patient size were employed.      This report was finalized on 12/29/2017 4:20 PM by Richelle Patel M.D..    CT Head Without Contrast [[id number]] Collected:  12/30/17 1717     Updated:  12/30/17 1718    Narrative:       FINAL REPORT    TECHNIQUE:  Multiple contiguous transaxial slices through the head were  obtained without the intravenous administration of contrast.    CLINICAL HISTORY:  ams    FINDINGS:  The brain parenchyma is normal in morphology and attenuation  without acute infarct, hemorrhage or mass effect. The fourth,  third and lateral ventricles are normal in size and position.  There is no skull fracture. No sinus air fluid level is present.      Impression:       No acute intracranial abnormality    Authenticated by Geovany Mullins MD on 12/30/2017 05:17:56 PM    XR Hip With or Without Pelvis 2 - 3 View Left [[id number]] Collected:  01/03/18 1403     Updated:  01/03/18 1406    Narrative:       PROCEDURE: XR HIP W OR WO PELVIS 2-3 VIEW LEFT-     HISTORY: left hip pain; J44.1-Chronic obstructive pulmonary disease with  (acute) exacerbation; J96.22-Acute and chronic respiratory failure with  hypercapnia; Z74.09-Other reduced mobility; Z74.09-Other reduced  mobility     COMPARISON: September 14, 2017.     FINDINGS: The patient is status  post total left hip arthroplasty. There  are no fractures or dislocations. The joint spaces are preserved. The  pubic symphysis and SI joints are intact. The soft tissues are  unremarkable.       Impression:       No fracture or dislocation.     This report was finalized on 1/3/2018 2:04 PM by Richelle Patel M.D..          Condition on Discharge:      Improved/stable    Code status during the hospital stay:    Conditional Code    Discharge Disposition:    Skilled Nursing Facility (DC - External)    Discharge Medication:     [facility] Medication Instructions FEI:[id number]    Printed on:01/11/18 7602   Medication Information                      amLODIPine (NORVASC) 5 MG tablet  TAKE ONE TABLET BY MOUTH DAILY             atorvastatin (LIPITOR) 40 MG tablet  Take 1 tablet by mouth Daily.             benzonatate (TESSALON) 200 MG capsule  Take 1 capsule by mouth 3 (Three) Times a Day As Needed for Cough.             budesonide (PULMICORT) 0.5 MG/2ML nebulizer solution  Take 2 mL by nebulization 2 (Two) Times a Day.             carvedilol (COREG) 3.125 MG tablet  TAKE ONE TABLET BY MOUTH TWICE A DAY WITH MEALS             clonazePAM (KlonoPIN) 0.5 MG tablet  Take 0.5 tablets by mouth At Night As Needed for Anxiety.             clopidogrel (PLAVIX) 75 MG tablet  Take 1 tablet by mouth Daily.             DULoxetine (CYMBALTA) 30 MG capsule  Take 1 capsule by mouth Daily.             escitalopram (LEXAPRO) 10 MG tablet  Take 1 tablet by mouth Daily.             finasteride (PROSCAR) 5 MG tablet  Take 1 tablet by mouth Daily.             fluticasone (FLONASE) 50 MCG/ACT nasal spray  1 spray into each nostril Daily.             guaifenesin-dextromethorphan (MUCINEX DM)  MG tablet sustained-release 12 hour tablet  Take 2 tablets by mouth 2 (Two) Times a Day.             ipratropium-albuterol (DUO-NEB) 0.5-2.5 mg/mL nebulizer  Take 3 mL by nebulization Every 6 (Six) Hours.             losartan  (COZAAR) 50 MG tablet  Take 1 tablet by mouth Daily.             megestrol acetate (MEGACE) 400 MG/10ML suspension oral suspension  Take 20 mL by mouth Daily.             metaxalone (SKELAXIN) 400 MG tablet  Take 1 tablet by mouth 3 (Three) Times a Day.             mirtazapine (REMERON) 15 MG tablet  Take 1 tablet by mouth Every Night.             Multiple Vitamin (MULTI-VITAMIN DAILY PO)  Take  by mouth Daily.             nitroglycerin (NITROSTAT) 0.4 MG SL tablet  Place  under the tongue.             nystatin (MYCOSTATIN) 412412 UNIT/ML suspension  Take 5 mL by mouth 4 (Four) Times a Day for 7 days.             oxybutynin (DITROPAN) 5 MG tablet  TAKE ONE TABLET BY MOUTH AT BEDTIME             pantoprazole (PROTONIX) 20 MG EC tablet  Take 1 tablet by mouth Every Morning Before Breakfast.             polyethylene glycol (MIRALAX) pack packet  Take 17 g by mouth Daily.             predniSONE (DELTASONE) 10 MG tablet  Take 10 mg by mouth Daily.             PROAIR  (90 BASE) MCG/ACT inhaler  Inhale 2 puffs Every 4 (Four) Hours As Needed for Wheezing or Shortness of Air.             sennosides-docusate sodium (SENOKOT-S) 8.6-50 MG tablet  Take 2 tablets by mouth Every Night.             tamsulosin (FLOMAX) 0.4 MG capsule 24 hr capsule  Take 1 capsule by mouth Every Night.             traMADol (ULTRAM) 50 MG tablet  Take 1 tablet by mouth Every 6 (Six) Hours As Needed for Moderate Pain  for up to 5 days.             traZODone (DESYREL) 50 MG tablet  Take 1 tablet by mouth Every Night.                 Discharge Diet:     Diet Instructions     Diet: Regular, Consistent Carbohydrate, Cardiac; Thin       Discharge Diet:   Regular  Consistent Carbohydrate  Cardiac      Fluid Consistency:  Thin                 Activity at Discharge:     Activity Instructions     Activity as Tolerated                     Follow-up Appointments:    Additional Instructions for the Follow-ups that You Need to Schedule     Discharge  Follow-up with Specified Provider: Dr. Lopez, pulmonology; 1 Week    As directed    To:  Dr. Lopez, pulmonology    Follow Up:  1 Week    Follow Up Details:  post-hospitalization follow up; he will be seen in facility by Dr. Lopez                 Follow-up Information     Follow up with Miguel Rojas MD .    Specialty:  Internal Medicine    Contact information:    [address] [place] [address] [phone]            Additional Instructions for the Follow-ups that You Need to Schedule     Discharge Follow-up with Specified Provider: Dr. Lopez, pulmonology; 1 Week    As directed    To:  Dr. Lopez, pulmonology    Follow Up:  1 Week    Follow Up Details:  post-hospitalization follow up; he will be seen in facility by Dr. Lopez                     Test Results Pending at Discharge:           Sahil Gallego DO  01/11/18  1:46 PM    Time: Discharge 45 min

## 2018-01-11 NOTE — PROGRESS NOTES
Continued Stay Note  CARLOS Shane     Patient Name: Thurman Mendel Parsons  MRN: [id number]  Today's Date: 1/11/2018    Admit Date: 12/24/2017          Discharge Plan       01/11/18 1321    Case Management/Social Work Plan    Additional Comments Pt has  [facility]  Nursing ot call report to [phone] C moser fax DC summary to [phone] be transported by EMS               Discharge Codes     None        Expected Discharge Date and Time     Expected Discharge Date Expected Discharge Time    [place] 10, 2018             Danielle Saldana

## 2018-01-11 NOTE — SIGNIFICANT NOTE
01/11/18 1127   Rehab Treatment   Discipline occupational therapist   Treatment Not Performed patient/family declined treatment  (Pt states he is being d/c to rehab today.  declines OT activity d/t upcoming d/c.)

## 2018-01-11 NOTE — PROGRESS NOTES
LOS: 17 days   Patient Care Team:  Miguel Rojas MD as PCP - General  Miguel Rojas MD as PCP - Family Medicine  Primary pulmonologist: Kevin Lopez M.D.  Management team: Hospitalist.  Change in hospitalist care this week.  Chief Complaint: Acute on chronic respiratory failure due to COPD exacerbation with bronchopneumonia with severe VQ mismatch from secretions.    Subjective     Shortness of Breath         Subjective:  Symptoms:  He reports shortness of breath.    Patient was admitted with acute respiratory failure on 12/24/17 with prolonged hospital stay due to high flow oxygen requirement as well as noninvasive positive pressure ventilation due to severe hypercapnia which progressively improved and currently on 4 L nasal cannula at rest is O2 saturation is 97-98% and was able to ambulate in the hallway.  His long-term acute care requirement in [place] was due to his high flow requirement as well as noninvasive ventilation requirements frequently during the daytime however that is markedly improved and is able to use his noninvasive ventilation at night and once in the afternoon and with nasal cannula oxygen and ambulatory and functional improvement he should do fine with a local rehabilitation for physical strengthening where I could also go and see the patient.    History taken from: patient chart family RN    Objective     Vital Signs  Temp:  [97.6 °F (36.4 °C)-98.3 °F (36.8 °C)] 98 °F (36.7 °C)  Heart Rate:  [74-92] 89  Resp:  [16-28] 18  BP: (108-125)/(52-78) 115/58    Objective    Results Review:     I reviewed the patient's new clinical results.  I reviewed the patient's other test results and agree with the interpretation    Medication Review: Medications were reviewed and no new changes made.    Assessment/Plan     Principal Problem:    Acute on chronic respiratory failure with hypoxia and hypercapnia  Active Problems:    Atherosclerotic heart disease of native coronary artery without angina  pectoris    Atrial fibrillation    Chronic kidney disease, stage III (moderate)    GERD without esophagitis    Hyperlipidemia    Hypertension    Enlarged prostate without lower urinary tract symptoms (luts)    Impaired mobility and ADLs    Physical deconditioning      Assessment & Plan  Acute on chronic respiratory failure due to severe VQ mismatch and bronchopneumonia.  Severe functional limitation progressively improving.  Patient remains alert.  Plan and suggest: Case management should try to get a local bed that he has been before and I should be able to see the patient this weekend to make sure everything is going correctly since he currently needs now more physical rehabilitation and occupational therapy rather than significant respiratory therapy.  We also in the process of starting an inpatient pulmonary rehabilitation at [place].  [facility] in near future.  Kevin Lopez MD  01/10/18  7:08 PM

## 2018-01-11 NOTE — SIGNIFICANT NOTE
01/11/18 1135   Rehab Treatment   Discipline physical therapy assistant   Treatment Not Performed patient/family declined treatment  (Pt declined d/t pending d/c. Therapy will f/u with pt at a later time if available.  )

## 2018-01-20 ENCOUNTER — APPOINTMENT (OUTPATIENT)
Dept: GENERAL RADIOLOGY | Facility: HOSPITAL | Age: 68
End: 2018-01-20

## 2018-01-20 ENCOUNTER — HOSPITAL ENCOUNTER (EMERGENCY)
Facility: HOSPITAL | Age: 68
Discharge: NURSING, REHAB OR LONG-TERM CARE | End: 2018-01-20
Attending: STUDENT IN AN ORGANIZED HEALTH CARE EDUCATION/TRAINING PROGRAM | Admitting: STUDENT IN AN ORGANIZED HEALTH CARE EDUCATION/TRAINING PROGRAM

## 2018-01-20 VITALS
DIASTOLIC BLOOD PRESSURE: 87 MMHG | BODY MASS INDEX: 21.66 KG/M2 | RESPIRATION RATE: 20 BRPM | HEIGHT: 67 IN | HEART RATE: 101 BPM | WEIGHT: 138 LBS | OXYGEN SATURATION: 97 % | TEMPERATURE: 98.5 F | SYSTOLIC BLOOD PRESSURE: 131 MMHG

## 2018-01-20 DIAGNOSIS — K52.9 GASTROENTERITIS: Primary | ICD-10-CM

## 2018-01-20 LAB
ALBUMIN SERPL-MCNC: 3 G/DL (ref 3.5–5)
ALBUMIN/GLOB SERPL: 1.1 G/DL (ref 1–2)
ALP SERPL-CCNC: 51 U/L (ref 38–126)
ALT SERPL W P-5'-P-CCNC: 36 U/L (ref 13–69)
ANION GAP SERPL CALCULATED.3IONS-SCNC: 7.4 MMOL/L
AST SERPL-CCNC: 27 U/L (ref 15–46)
BACTERIA UR QL AUTO: ABNORMAL /HPF
BASOPHILS # BLD AUTO: 0.02 10*3/MM3 (ref 0–0.2)
BASOPHILS NFR BLD AUTO: 0.3 % (ref 0–2.5)
BILIRUB SERPL-MCNC: 0.5 MG/DL (ref 0.2–1.3)
BILIRUB UR QL STRIP: NEGATIVE
BUN BLD-MCNC: 37 MG/DL (ref 7–20)
BUN/CREAT SERPL: 37 (ref 6.3–21.9)
CALCIUM SPEC-SCNC: 8.1 MG/DL (ref 8.4–10.2)
CHLORIDE SERPL-SCNC: 102 MMOL/L (ref 98–107)
CLARITY UR: CLEAR
CO2 SERPL-SCNC: 35 MMOL/L (ref 26–30)
COLOR UR: ABNORMAL
CREAT BLD-MCNC: 1 MG/DL (ref 0.6–1.3)
DEPRECATED RDW RBC AUTO: 43.1 FL (ref 37–54)
EOSINOPHIL # BLD AUTO: 0.29 10*3/MM3 (ref 0–0.7)
EOSINOPHIL NFR BLD AUTO: 4.5 % (ref 0–7)
ERYTHROCYTE [DISTWIDTH] IN BLOOD BY AUTOMATED COUNT: 12.7 % (ref 11.5–14.5)
GFR SERPL CREATININE-BSD FRML MDRD: 75 ML/MIN/1.73
GLOBULIN UR ELPH-MCNC: 2.7 GM/DL
GLUCOSE BLD-MCNC: 83 MG/DL (ref 74–98)
GLUCOSE UR STRIP-MCNC: NEGATIVE MG/DL
HCT VFR BLD AUTO: 32.7 % (ref 42–52)
HGB BLD-MCNC: 10.3 G/DL (ref 14–18)
HGB UR QL STRIP.AUTO: NEGATIVE
HYALINE CASTS UR QL AUTO: ABNORMAL /LPF
IMM GRANULOCYTES # BLD: 0.04 10*3/MM3 (ref 0–0.06)
IMM GRANULOCYTES NFR BLD: 0.6 % (ref 0–0.6)
KETONES UR QL STRIP: NEGATIVE
LEUKOCYTE ESTERASE UR QL STRIP.AUTO: NEGATIVE
LYMPHOCYTES # BLD AUTO: 0.54 10*3/MM3 (ref 0.6–3.4)
LYMPHOCYTES NFR BLD AUTO: 8.4 % (ref 10–50)
MCH RBC QN AUTO: 29.3 PG (ref 27–31)
MCHC RBC AUTO-ENTMCNC: 31.5 G/DL (ref 30–37)
MCV RBC AUTO: 93.2 FL (ref 80–94)
MONOCYTES # BLD AUTO: 0.77 10*3/MM3 (ref 0–0.9)
MONOCYTES NFR BLD AUTO: 12 % (ref 0–12)
NEUTROPHILS # BLD AUTO: 4.78 10*3/MM3 (ref 2–6.9)
NEUTROPHILS NFR BLD AUTO: 74.2 % (ref 37–80)
NITRITE UR QL STRIP: NEGATIVE
NRBC BLD MANUAL-RTO: 0 /100 WBC (ref 0–0)
PH UR STRIP.AUTO: 7 [PH] (ref 5–8)
PLATELET # BLD AUTO: 201 10*3/MM3 (ref 130–400)
PMV BLD AUTO: 9.3 FL (ref 6–12)
POTASSIUM BLD-SCNC: 4.4 MMOL/L (ref 3.5–5.1)
PROT SERPL-MCNC: 5.7 G/DL (ref 6.3–8.2)
PROT UR QL STRIP: ABNORMAL
RBC # BLD AUTO: 3.51 10*6/MM3 (ref 4.7–6.1)
RBC # UR: ABNORMAL /HPF
REF LAB TEST METHOD: ABNORMAL
SODIUM BLD-SCNC: 140 MMOL/L (ref 137–145)
SP GR UR STRIP: 1.02 (ref 1–1.03)
SQUAMOUS #/AREA URNS HPF: ABNORMAL /HPF
UROBILINOGEN UR QL STRIP: ABNORMAL
WBC NRBC COR # BLD: 6.44 10*3/MM3 (ref 4.8–10.8)
WBC UR QL AUTO: ABNORMAL /HPF

## 2018-01-20 PROCEDURE — 99284 EMERGENCY DEPT VISIT MOD MDM: CPT

## 2018-01-20 PROCEDURE — 80053 COMPREHEN METABOLIC PANEL: CPT | Performed by: STUDENT IN AN ORGANIZED HEALTH CARE EDUCATION/TRAINING PROGRAM

## 2018-01-20 PROCEDURE — 25010000002 ONDANSETRON PER 1 MG: Performed by: STUDENT IN AN ORGANIZED HEALTH CARE EDUCATION/TRAINING PROGRAM

## 2018-01-20 PROCEDURE — 81001 URINALYSIS AUTO W/SCOPE: CPT | Performed by: STUDENT IN AN ORGANIZED HEALTH CARE EDUCATION/TRAINING PROGRAM

## 2018-01-20 PROCEDURE — 71045 X-RAY EXAM CHEST 1 VIEW: CPT

## 2018-01-20 PROCEDURE — 93005 ELECTROCARDIOGRAM TRACING: CPT | Performed by: STUDENT IN AN ORGANIZED HEALTH CARE EDUCATION/TRAINING PROGRAM

## 2018-01-20 PROCEDURE — 96374 THER/PROPH/DIAG INJ IV PUSH: CPT

## 2018-01-20 PROCEDURE — 85025 COMPLETE CBC W/AUTO DIFF WBC: CPT | Performed by: STUDENT IN AN ORGANIZED HEALTH CARE EDUCATION/TRAINING PROGRAM

## 2018-01-20 RX ORDER — ONDANSETRON 4 MG/1
4 TABLET, ORALLY DISINTEGRATING ORAL EVERY 6 HOURS PRN
Qty: 20 TABLET | Refills: 0 | Status: SHIPPED | OUTPATIENT
Start: 2018-01-20 | End: 2018-02-19

## 2018-01-20 RX ORDER — ONDANSETRON 2 MG/ML
8 INJECTION INTRAMUSCULAR; INTRAVENOUS ONCE
Status: COMPLETED | OUTPATIENT
Start: 2018-01-20 | End: 2018-01-20

## 2018-01-20 RX ADMIN — ONDANSETRON 8 MG: 2 INJECTION INTRAMUSCULAR; INTRAVENOUS at 08:42

## 2018-01-20 NOTE — ED NOTES
Dispatch called for ambulance to take patient back to [facility].     India Verduzco  01/20/18 1043

## 2018-01-20 NOTE — ED PROVIDER NOTES
Subjective   HPI Comments: Patient is a 67-year-old male that presents from [facility] with 2 days of persistent nausea and vomiting and diarrhea.  Patient is chronically ill COPD he normally wears 6 L/m via nasal cannula 24 hours a day.  He reports he's been able to eat or drink anything for the past day.  Nothing makes his symptoms better or worse.      Review of Systems   All other systems reviewed and are negative.      Past Medical History:   Diagnosis Date   • Abnormal liver enzymes    • Acute bronchitis    • Anemia    • Anxiety    • Arthritis    • Atherosclerotic heart disease of native coronary artery without angina pectoris    • Atrial fibrillation    • Atypical chest pain    • Back pain    • BPH (benign prostatic hyperplasia)    • Cardiac pain    • Chronic bronchitis    • Chronic kidney disease    • COPD (chronic obstructive pulmonary disease)    • Degeneration of cervical intervertebral disc    • Depression    • Diabetes mellitus    • Diverticulosis    • Dyspnea    • Esophageal reflux    • Esophagitis    • Gastritis    • Hematuria    • Hemorrhoids    • Hiatal hernia    • History of arthritis    • History of myocardial infarction    • History of peripheral neuropathy    • History of ventricular septal defect    • History of ventricular septal defect    • Hyperlipidemia    • Hypertension    • Infectious diarrhea    • Infectious diarrhea    • Kyphosis, acquired    • Lumbar radiculopathy    • Mitral and aortic valve disease    • Myocardial infarction    • Nephrolithiasis    • Phimosis    • Respiratory failure    • Sleep apnea    • Stroke syndrome    • Stroke syndrome    • Urinary calculus    • Urinary tract infection    • Ventral hernia        Allergies   Allergen Reactions   • Milk-Related Compounds    • Mucomyst [Acetylcysteine] Other (See Comments)     bronchospasms   • Sulfa Antibiotics Other (See Comments), Nausea Only and Nausea And Vomiting       Past Surgical History:   Procedure Laterality  Date   • HERNIA REPAIR     • SUPRAPUBIC CATHETER INSERTION      History of Percutaneous Catheter Placement Into Ureter   • THROAT SURGERY     • VSD REPAIR      History of VSD Repair By Patch Closure       Family History   Problem Relation Age of Onset   • Other Father      CABG   • Coronary artery disease Father        Social History     Social History   • Marital status:      Spouse name: N/A   • Number of children: N/A   • Years of education: N/A     Occupational History   •  Retired     Social History Main Topics   • Smoking status: Former Smoker     Quit date: 2017   • Smokeless tobacco: Never Used   • Alcohol use No   • Drug use: No   • Sexual activity: Defer     Other Topics Concern   • None     Social History Narrative           Objective   Physical Exam   Nursing note and vitals reviewed.    GEN: He does not feel well, but nontoxic   Head: Normocephalic, atraumatic  Eyes: Pupils equal round reactive to light  ENT: Posterior pharynx normal in appearance, oral mucosa is dry  Chest: Nontender to palpation  Cardiovascular: Tachycardia in the 110s  Lungs: Coarse breath sounds bilaterally, tachypneic with a rate in the 20s  Abdomen: Soft, nontender, nondistended, no peritoneal signs  Neuro: GCS 15  Psych: Mood and affect are appropriate    Procedures         ED Course  ED Course   Comment By Time   EKG shows sinus rhythm at a rate of 97.  A short IL interval of 114 with occasional PVCs.  Right bundle block.  Nonspecific ST segments in leads aVL.  This is an abnormal EKG. Bigg Campos MD 01/20 1024                  MDM  Number of Diagnoses or Management Options  Gastroenteritis:   Diagnosis management comments: Differential diagnosis would include viral versus bacterial gastroenteritis, food poisoning, irritable bowel syndrome, or other concerns  Patient likely has normal rotavirus as this is running through the nursing home he is at.  The hospital is mandated that all patients that come from his nursing  home be treated with droplet precautions.  Patient was treated with IV fluids and antiemetics with good symptom control  At this time I am unsure what is causing the patient's symptoms but highly doubt any life-threatening or sinister pathology  Patient will be sent home on oral antiemetics  Recommended follow-up in 3-4 days if not improving          XR Chest 1 View (Final result) Result time: 01/20/18 09:59:51    Final result by Richelle Patel MD (01/20/18 09:59:51)    Impression:    No acute cardiopulmonary process.     Continued followup is recommended.     This report was finalized on 1/20/2018 9:59 AM by Richelle Patel M.D..    Narrative:    PROCEDURE: XR CHEST 1 VW-     HISTORY: sob, nausea&vomiting     COMPARISON: December 27, 2017.     FINDINGS: The heart is normal in size. The mediastinum is unremarkable.  There are chronic interstitial lung changes. There are no focal  opacities were effusions. There is no pneumothorax.  There are no acute  osseous abnormalities.                  Amount and/or Complexity of Data Reviewed  Clinical lab tests: ordered and reviewed  Decide to obtain previous medical records or to obtain history from someone other than the patient: yes  Obtain history from someone other than the patient: yes  Review and summarize past medical records: yes  Independent visualization of images, tracings, or specimens: yes        Final diagnoses:   Gastroenteritis            Bigg Campos MD  01/20/18 1052

## 2018-01-20 NOTE — ED NOTES
Radiology tech at bedside.     Patient placed in spore precautions upon arrival      Jose De Jesus Curran RN  01/20/18 0857

## 2018-01-20 NOTE — ED NOTES
Lab called and said blood work was hemolyzed VERNON Dela Cruz notified and phlebotomy notified to come and draw labs.     India Verduzco  01/20/18 0970

## 2018-01-22 ENCOUNTER — OUTSIDE FACILITY SERVICE (OUTPATIENT)
Dept: INTERNAL MEDICINE | Facility: CLINIC | Age: 68
End: 2018-01-22

## 2018-01-22 PROCEDURE — 99306 1ST NF CARE HIGH MDM 50: CPT | Performed by: INTERNAL MEDICINE

## 2018-02-05 RX ORDER — ALPRAZOLAM 0.5 MG/1
TABLET ORAL
Qty: 30 TABLET | Refills: 1 | Status: SHIPPED | OUTPATIENT
Start: 2018-02-05 | End: 2018-02-06 | Stop reason: SDUPTHER

## 2018-02-07 RX ORDER — ALPRAZOLAM 0.5 MG/1
TABLET ORAL
Qty: 30 TABLET | Refills: 1 | Status: SHIPPED | OUTPATIENT
Start: 2018-02-07 | End: 2018-02-19

## 2018-02-09 RX ORDER — CYCLOBENZAPRINE HCL 5 MG
5 TABLET ORAL 3 TIMES DAILY PRN
Qty: 90 TABLET | Refills: 1 | Status: SHIPPED | OUTPATIENT
Start: 2018-02-09 | End: 2018-02-15

## 2018-02-13 ENCOUNTER — OUTSIDE FACILITY SERVICE (OUTPATIENT)
Dept: INTERNAL MEDICINE | Facility: CLINIC | Age: 68
End: 2018-02-13

## 2018-02-13 PROCEDURE — G0180 MD CERTIFICATION HHA PATIENT: HCPCS | Performed by: INTERNAL MEDICINE

## 2018-02-15 RX ORDER — BACLOFEN 10 MG/1
10 TABLET ORAL 2 TIMES DAILY
Qty: 60 TABLET | Refills: 0 | Status: SHIPPED | OUTPATIENT
Start: 2018-02-15 | End: 2018-02-19

## 2018-02-19 ENCOUNTER — OFFICE VISIT (OUTPATIENT)
Dept: INTERNAL MEDICINE | Facility: CLINIC | Age: 68
End: 2018-02-19

## 2018-02-19 VITALS
WEIGHT: 151 LBS | DIASTOLIC BLOOD PRESSURE: 62 MMHG | OXYGEN SATURATION: 92 % | RESPIRATION RATE: 14 BRPM | HEIGHT: 67 IN | HEART RATE: 80 BPM | TEMPERATURE: 99 F | SYSTOLIC BLOOD PRESSURE: 110 MMHG | BODY MASS INDEX: 23.7 KG/M2

## 2018-02-19 DIAGNOSIS — J44.9 STEROID-DEPENDENT COPD (HCC): ICD-10-CM

## 2018-02-19 DIAGNOSIS — Z74.09 IMPAIRED MOBILITY AND ADLS: ICD-10-CM

## 2018-02-19 DIAGNOSIS — Z78.9 IMPAIRED MOBILITY AND ADLS: ICD-10-CM

## 2018-02-19 DIAGNOSIS — I25.10 ATHEROSCLEROSIS OF NATIVE CORONARY ARTERY OF NATIVE HEART WITHOUT ANGINA PECTORIS: Primary | ICD-10-CM

## 2018-02-19 DIAGNOSIS — N18.30 CHRONIC KIDNEY DISEASE, STAGE III (MODERATE) (HCC): ICD-10-CM

## 2018-02-19 DIAGNOSIS — E11.8 TYPE 2 DIABETES MELLITUS WITH COMPLICATION, WITHOUT LONG-TERM CURRENT USE OF INSULIN (HCC): ICD-10-CM

## 2018-02-19 DIAGNOSIS — E78.5 HYPERLIPIDEMIA, UNSPECIFIED HYPERLIPIDEMIA TYPE: ICD-10-CM

## 2018-02-19 DIAGNOSIS — K64.9 HEMORRHOIDS, UNSPECIFIED HEMORRHOID TYPE: ICD-10-CM

## 2018-02-19 DIAGNOSIS — F41.9 ANXIETY: ICD-10-CM

## 2018-02-19 DIAGNOSIS — G47.30 SLEEP APNEA, UNSPECIFIED TYPE: ICD-10-CM

## 2018-02-19 DIAGNOSIS — K21.9 GERD WITHOUT ESOPHAGITIS: ICD-10-CM

## 2018-02-19 DIAGNOSIS — I48.91 ATRIAL FIBRILLATION, UNSPECIFIED TYPE (HCC): ICD-10-CM

## 2018-02-19 PROCEDURE — 99214 OFFICE O/P EST MOD 30 MIN: CPT | Performed by: INTERNAL MEDICINE

## 2018-02-19 RX ORDER — TRAMADOL HYDROCHLORIDE 50 MG/1
TABLET ORAL
COMMUNITY
Start: 2018-01-31 | End: 2018-02-19

## 2018-02-19 RX ORDER — ALFUZOSIN HYDROCHLORIDE 10 MG/1
TABLET, EXTENDED RELEASE ORAL
COMMUNITY
Start: 2018-02-06 | End: 2018-02-19

## 2018-02-19 RX ORDER — LOSARTAN POTASSIUM 100 MG/1
100 TABLET ORAL DAILY
COMMUNITY
Start: 2018-02-14 | End: 2018-03-27 | Stop reason: SDUPTHER

## 2018-02-19 NOTE — PROGRESS NOTES
Subjective   Thurman Mendel Parsons is a 67 y.o. male.     Chief Complaint   Patient presents with   • Follow-up     hospital follow up    • Medication Problem     discuss all meds       History of Present Illness   Patient was recently discharged from hospital for COPD exacerbation respiratory failure patient was in rehabilitation for 8 days.  Patient was discharged on last month's period.  Patient today to follow-up.  Patient concerns multiple medications patient is on 31 tablets on the list.  We need to go through a lot of medications.  Diabetes is stable.  Patient denies any significant cough patient still has baseline wheezing and short of breath.  Patient is on respiratory treatments.  Patient is oxygen 5 L nasal cannula at home.  Depression stable medication.  Chronica kidney disease is seeing kidney doctor.    Current Outpatient Prescriptions:   •  alfuzosin (UROXATRAL) 10 MG 24 hr tablet, , Disp: , Rfl:   •  amLODIPine (NORVASC) 5 MG tablet, TAKE ONE TABLET BY MOUTH DAILY, Disp: 30 tablet, Rfl: 5  •  atorvastatin (LIPITOR) 40 MG tablet, Take 1 tablet by mouth Daily., Disp: 30 tablet, Rfl: 3  •  benzonatate (TESSALON) 200 MG capsule, Take 1 capsule by mouth 3 (Three) Times a Day As Needed for Cough., Disp: , Rfl:   •  budesonide (PULMICORT) 0.5 MG/2ML nebulizer solution, Take 2 mL by nebulization 2 (Two) Times a Day., Disp: , Rfl:   •  carvedilol (COREG) 3.125 MG tablet, TAKE ONE TABLET BY MOUTH TWICE A DAY WITH MEALS (Patient taking differently: TAKE ONE TABLET BY MOUTH TWICE A DAY WITH MEALS      6.25 bid), Disp: 60 tablet, Rfl: 2  •  clopidogrel (PLAVIX) 75 MG tablet, Take 1 tablet by mouth Daily., Disp: 90 tablet, Rfl: 1  •  finasteride (PROSCAR) 5 MG tablet, Take 1 tablet by mouth Daily., Disp: 90 tablet, Rfl: 3  •  fluticasone (FLONASE) 50 MCG/ACT nasal spray, 1 spray into each nostril Daily., Disp: 16 g, Rfl: 5  •  ipratropium-albuterol (DUO-NEB) 0.5-2.5 mg/mL nebulizer, Take 3 mL by nebulization  Every 6 (Six) Hours., Disp: 360 mL, Rfl:   •  mirtazapine (REMERON) 15 MG tablet, Take 1 tablet by mouth Every Night., Disp: , Rfl:   •  Multiple Vitamin (MULTI-VITAMIN DAILY PO), Take  by mouth Daily., Disp: , Rfl:   •  nitroglycerin (NITROSTAT) 0.4 MG SL tablet, Place  under the tongue., Disp: , Rfl:   •  ondansetron ODT (ZOFRAN-ODT) 4 MG disintegrating tablet, Take 1 tablet by mouth Every 6 (Six) Hours As Needed for Nausea or Vomiting., Disp: 20 tablet, Rfl: 0  •  oxybutynin (DITROPAN) 5 MG tablet, TAKE ONE TABLET BY MOUTH AT BEDTIME, Disp: 30 tablet, Rfl: 5  •  polyethylene glycol (MIRALAX) pack packet, Take 17 g by mouth Daily., Disp: , Rfl:   •  predniSONE (DELTASONE) 10 MG tablet, Take 10 mg by mouth Daily., Disp: , Rfl:   •  PROAIR  (90 BASE) MCG/ACT inhaler, Inhale 2 puffs Every 4 (Four) Hours As Needed for Wheezing or Shortness of Air., Disp: , Rfl:   •  sennosides-docusate sodium (SENOKOT-S) 8.6-50 MG tablet, Take 2 tablets by mouth Every Night., Disp: , Rfl:   •  tamsulosin (FLOMAX) 0.4 MG capsule 24 hr capsule, Take 1 capsule by mouth Every Night., Disp: 30 capsule, Rfl:   •  traZODone (DESYREL) 50 MG tablet, Take 1 tablet by mouth Every Night., Disp: , Rfl:   •  ALPRAZolam (XANAX) 0.5 MG tablet, TAKE ONE TABLET BY MOUTH EVERY NIGHT AT BEDTIME AS NEEDED FOR ANXIETY, Disp: 30 tablet, Rfl: 1  •  baclofen (LIORESAL) 10 MG tablet, Take 1 tablet by mouth 2 (Two) Times a Day., Disp: 60 tablet, Rfl: 0  •  clonazePAM (KlonoPIN) 0.5 MG tablet, Take 0.5 tablets by mouth At Night As Needed for Anxiety., Disp: 5 tablet, Rfl: 0  •  DULoxetine (CYMBALTA) 30 MG capsule, Take 1 capsule by mouth Daily., Disp: , Rfl:   •  escitalopram (LEXAPRO) 10 MG tablet, Take 1 tablet by mouth Daily. (Patient taking differently: Take 10 mg by mouth Every Evening.), Disp: 90 tablet, Rfl: 1  •  guaifenesin-dextromethorphan (MUCINEX DM)  MG tablet sustained-release 12 hour tablet, Take 2 tablets by mouth 2 (Two) Times a  Day., Disp: , Rfl:   •  losartan (COZAAR) 100 MG tablet, , Disp: , Rfl:   •  losartan (COZAAR) 50 MG tablet, Take 1 tablet by mouth Daily., Disp: , Rfl:   •  megestrol acetate (MEGACE) 400 MG/10ML suspension oral suspension, Take 20 mL by mouth Daily., Disp: 240 mL, Rfl: 0  •  traMADol (ULTRAM) 50 MG tablet, , Disp: , Rfl:     The following portions of the patient's history were reviewed and updated as appropriate: allergies, current medications, past family history, past medical history, past social history, past surgical history and problem list.    Review of Systems   Constitutional: Negative.    Respiratory: Positive for shortness of breath and wheezing. Negative for cough.    Cardiovascular: Negative.  Negative for chest pain.   Gastrointestinal: Negative.    Musculoskeletal: Negative.    Skin: Negative.    Neurological: Negative.    Psychiatric/Behavioral: Negative.        Objective   Physical Exam   Constitutional: He is oriented to person, place, and time. He appears well-nourished.   Neck: Neck supple.   Cardiovascular: Normal rate, regular rhythm and normal heart sounds.    Pulmonary/Chest: He has wheezes.   soa with O2 5L   Abdominal: Bowel sounds are normal.   Neurological: He is alert and oriented to person, place, and time.   Skin: Skin is warm.   Psychiatric: He has a normal mood and affect.       All tests have been reviewed.    Assessment/Plan   There are no diagnoses linked to this encounter.          COPD continue inhaler chronic prednisone 10mg. Follow up with lung doctor this PM, continue meds and O2  Dysphagia follow GI  Left neck pain start baclofen   osteopenia, oscal D 500mg bid continue  Weight loss  watch  left low back pain follow up with ortho s/p left hip surgery for severe OA doing well.   alkaline phosphatase still high watch---  divertic hemorroid on colon 1/2013  DM stable without med  phimosis seen by uro declined surgery  Hematuria followup with urologist s/p cystoscope bladder  stones  Hypertension continue med  BPH enlarged on CT scan follow up with urologist.  Anemia workup all negative.? anemia of chronic disease due to chronic disease   CKD follow up with kidney doctor  History of the CAD atrial fibrillation VSD status post occlusion stable now. Status post a defibrillator seen by cardio follow up with cardio  History of a TIA patient is on Plavix continue  pneumovax done, zostavax to pharmacy, prevnar 13 1/19/17, Td to HD.flushot done  Depression cont lexapro 20  Decline to disrobe for PE  right inquinal hernia decline surgery now, call if worse  BLE edema mild, watch for now, cut down salt  Neck pain chiro for now  2 weeks after labs bring all meds  2 mo wellnexx

## 2018-02-20 ENCOUNTER — TELEPHONE (OUTPATIENT)
Dept: INTERNAL MEDICINE | Facility: CLINIC | Age: 68
End: 2018-02-20

## 2018-02-20 PROBLEM — J20.9 ACUTE BRONCHITIS: Status: ACTIVE | Noted: 2018-02-20

## 2018-02-20 PROBLEM — J96.90 RESPIRATORY FAILURE (HCC): Status: ACTIVE | Noted: 2018-02-20

## 2018-02-20 NOTE — TELEPHONE ENCOUNTER
*Patient is unable to use his nebulizer and he takes 3 treatments per day.*    Supplies for the Nebulizer(Float, mouth piece etc)  Patient isn't sure if he is due to get a new one or if he needs the parts to replace.      [facility]    Fax: [phone]  Phone: [phone]      Patient is requesting a return call    [phone]

## 2018-02-20 NOTE — TELEPHONE ENCOUNTER
Silvia from [facility] called regarding Mr. Pena. She would like to get an order for a beside commode. She would like this order faxed to [facility] [phone].    Would you be ok with this?

## 2018-02-21 ENCOUNTER — TELEPHONE (OUTPATIENT)
Dept: INTERNAL MEDICINE | Facility: CLINIC | Age: 68
End: 2018-02-21

## 2018-02-21 ENCOUNTER — TELEPHONE (OUTPATIENT)
Dept: CARDIOLOGY | Facility: CLINIC | Age: 68
End: 2018-02-21

## 2018-02-21 NOTE — TELEPHONE ENCOUNTER
Patient called asking if dr hopkins will write him a script for karoline. He states dr latham usually does this but is out of the office. He states he needs to  the prescription because he can get it cheaper by his brother who is a pharmacsit.

## 2018-02-21 NOTE — TELEPHONE ENCOUNTER
Cherry with [facility] calling to report pt has increased bilateral LE edema/AQUINO (home O2 for COPD). Last evaluated by Dr. Rojas on 2/19/18. Current weight is 151 lbs. On 1/31/18 at discharge from rehab he weighted 136 lbs. HR 66, /70, RR 18, 98% on 2 liters O2.  Cardiac meds:  amlodipine 5 mg daily  Carvedilol 3.125 mg BID  Plavix 75 mg daily  Atorvastatin 40 mg daily  Losartan 50 mg daily  NO diuretics, has seen nephrology in the past.  Last BUN 37, creatinine 1.00   1/20/18    Please advise if med changes appropriate or defer to nephrology.   Jaimee Carey with [facility] - [phone]  Patient - [phone]

## 2018-03-05 ENCOUNTER — OFFICE VISIT (OUTPATIENT)
Dept: INTERNAL MEDICINE | Facility: CLINIC | Age: 68
End: 2018-03-05

## 2018-03-05 VITALS
SYSTOLIC BLOOD PRESSURE: 128 MMHG | HEIGHT: 67 IN | TEMPERATURE: 98 F | WEIGHT: 151 LBS | DIASTOLIC BLOOD PRESSURE: 62 MMHG | RESPIRATION RATE: 14 BRPM | HEART RATE: 82 BPM | BODY MASS INDEX: 23.7 KG/M2 | OXYGEN SATURATION: 93 %

## 2018-03-05 DIAGNOSIS — I48.91 ATRIAL FIBRILLATION, UNSPECIFIED TYPE (HCC): ICD-10-CM

## 2018-03-05 DIAGNOSIS — F41.9 ANXIETY: ICD-10-CM

## 2018-03-05 DIAGNOSIS — I25.10 ATHEROSCLEROSIS OF NATIVE CORONARY ARTERY OF NATIVE HEART WITHOUT ANGINA PECTORIS: Primary | ICD-10-CM

## 2018-03-05 DIAGNOSIS — I08.0 MITRAL AND AORTIC VALVE DISEASE: ICD-10-CM

## 2018-03-05 DIAGNOSIS — F32.A DEPRESSION, UNSPECIFIED DEPRESSION TYPE: ICD-10-CM

## 2018-03-05 DIAGNOSIS — N18.30 CHRONIC KIDNEY DISEASE, STAGE III (MODERATE) (HCC): ICD-10-CM

## 2018-03-05 DIAGNOSIS — E11.8 TYPE 2 DIABETES MELLITUS WITH COMPLICATION, WITHOUT LONG-TERM CURRENT USE OF INSULIN (HCC): ICD-10-CM

## 2018-03-05 DIAGNOSIS — M54.2 NECK PAIN: ICD-10-CM

## 2018-03-05 DIAGNOSIS — I10 ESSENTIAL HYPERTENSION: ICD-10-CM

## 2018-03-05 DIAGNOSIS — K21.9 GERD WITHOUT ESOPHAGITIS: ICD-10-CM

## 2018-03-05 PROBLEM — J20.9 ACUTE BRONCHITIS: Status: RESOLVED | Noted: 2018-02-20 | Resolved: 2018-03-05

## 2018-03-05 PROBLEM — J96.90 RESPIRATORY FAILURE (HCC): Status: RESOLVED | Noted: 2018-02-20 | Resolved: 2018-03-05

## 2018-03-05 PROCEDURE — 99214 OFFICE O/P EST MOD 30 MIN: CPT | Performed by: INTERNAL MEDICINE

## 2018-03-05 NOTE — PROGRESS NOTES
Subjective   Thurman Mendel Parsons is a 67 y.o. male.     Chief Complaint   Patient presents with   • Follow-up       History of Present Illness   Patient here for follow-up.  COPD stable now.  Diabetes is stable.  Hypertension stable medication amlodipine discontinued by cardiologist.  Chronica kidney disease is stable.  Depression anxiety stable medication.    Current Outpatient Prescriptions:   •  atorvastatin (LIPITOR) 40 MG tablet, Take 1 tablet by mouth Daily., Disp: 30 tablet, Rfl: 3  •  BREO ELLIPTA 200-25 MCG/INH aerosol powder , Inhale 1 each Daily., Disp: 60 each, Rfl: 5  •  budesonide (PULMICORT) 0.5 MG/2ML nebulizer solution, Take 2 mL by nebulization 2 (Two) Times a Day., Disp: , Rfl:   •  carvedilol (COREG) 3.125 MG tablet, TAKE ONE TABLET BY MOUTH TWICE A DAY WITH MEALS (Patient taking differently: TAKE ONE TABLET BY MOUTH TWICE A DAY WITH MEALS      6.25 bid), Disp: 60 tablet, Rfl: 2  •  clonazePAM (KlonoPIN) 0.5 MG tablet, Take 0.5 tablets by mouth At Night As Needed for Anxiety., Disp: 5 tablet, Rfl: 0  •  clopidogrel (PLAVIX) 75 MG tablet, Take 1 tablet by mouth Daily., Disp: 90 tablet, Rfl: 1  •  escitalopram (LEXAPRO) 10 MG tablet, Take 1 tablet by mouth Daily. (Patient taking differently: Take 10 mg by mouth Every Evening.), Disp: 90 tablet, Rfl: 1  •  finasteride (PROSCAR) 5 MG tablet, Take 1 tablet by mouth Daily., Disp: 90 tablet, Rfl: 3  •  fluticasone (FLONASE) 50 MCG/ACT nasal spray, 1 spray into each nostril Daily., Disp: 16 g, Rfl: 5  •  ipratropium-albuterol (DUO-NEB) 0.5-2.5 mg/mL nebulizer, Take 3 mL by nebulization Every 6 (Six) Hours., Disp: 360 mL, Rfl:   •  losartan (COZAAR) 100 MG tablet, , Disp: , Rfl:   •  nitroglycerin (NITROSTAT) 0.4 MG SL tablet, Place  under the tongue., Disp: , Rfl:   •  oxybutynin (DITROPAN) 5 MG tablet, TAKE ONE TABLET BY MOUTH AT BEDTIME, Disp: 30 tablet, Rfl: 5  •  predniSONE (DELTASONE) 10 MG tablet, Take 10 mg by mouth Daily., Disp: , Rfl:   •   PROAIR  (90 BASE) MCG/ACT inhaler, Inhale 2 puffs Every 4 (Four) Hours As Needed for Wheezing or Shortness of Air., Disp: , Rfl:   •  tamsulosin (FLOMAX) 0.4 MG capsule 24 hr capsule, Take 1 capsule by mouth Every Night., Disp: 30 capsule, Rfl:     The following portions of the patient's history were reviewed and updated as appropriate: allergies, current medications, past family history, past medical history, past social history, past surgical history and problem list.    Review of Systems   Constitutional: Negative.    Respiratory: Positive for shortness of breath.    Cardiovascular: Negative.    Gastrointestinal: Negative.    Musculoskeletal: Negative.    Skin: Negative.    Neurological: Negative.    Psychiatric/Behavioral: Negative.        Objective   Physical Exam   Constitutional: He is oriented to person, place, and time. He appears well-nourished.   Neck: Neck supple.   Cardiovascular: Normal rate, regular rhythm and normal heart sounds.    Pulmonary/Chest: Effort normal. He has wheezes.   Abdominal: Bowel sounds are normal.   Neurological: He is alert and oriented to person, place, and time.   Skin: Skin is warm.   Psychiatric: He has a normal mood and affect.       All tests have been reviewed.    Assessment/Plan   There are no diagnoses linked to this encounter.          COPD continue inhaler chronic prednisone 10mg. Follow up with lung doctor this PM, continue meds and O2  Dysphagia follow GI  Left neck pain continue baclofen tylenol and PT  osteopenia, oscal D 500mg bid continue  Weight loss  watch  left low back pain follow up with ortho s/p left hip surgery for severe OA doing well.   alkaline phosphatase still high watch---  divertic hemorroid on colon 1/2013  DM stable without med  phimosis seen by uro declined surgery  Hematuria followup with urologist s/p cystoscope bladder stones  Hypertension continue med  BPH enlarged on CT scan follow up with urologist.  Anemia workup all  negative.? anemia of chronic disease due to chronic disease   CKD follow up with kidney doctor  History of the CAD atrial fibrillation VSD status post occlusion stable now. Status post a defibrillator seen by cardio follow up with cardio  History of a TIA patient is on Plavix continue  pneumovax done, zostavax to pharmacy, prevnar 13 1/19/17, Td to HD.flushot done  Depression cont lexapro 20  Decline to disrobe for PE  right inquinal hernia decline surgery now, call if worse  BLE edema mild, watch for now, cut down salt  Neck pain chiro for now  2 weeks after labs bring all meds  2 mo wellnexx

## 2018-03-07 ENCOUNTER — OFFICE VISIT (OUTPATIENT)
Dept: UROLOGY | Facility: CLINIC | Age: 68
End: 2018-03-07

## 2018-03-07 VITALS
DIASTOLIC BLOOD PRESSURE: 67 MMHG | RESPIRATION RATE: 16 BRPM | BODY MASS INDEX: 23.49 KG/M2 | TEMPERATURE: 98.3 F | HEART RATE: 69 BPM | SYSTOLIC BLOOD PRESSURE: 124 MMHG | WEIGHT: 150 LBS | OXYGEN SATURATION: 95 %

## 2018-03-07 DIAGNOSIS — N40.1 BPH WITH URINARY OBSTRUCTION: ICD-10-CM

## 2018-03-07 DIAGNOSIS — R60.0 LOCALIZED EDEMA: ICD-10-CM

## 2018-03-07 DIAGNOSIS — N13.8 BPH WITH URINARY OBSTRUCTION: ICD-10-CM

## 2018-03-07 DIAGNOSIS — R35.1 NOCTURIA: Primary | ICD-10-CM

## 2018-03-07 LAB
BILIRUB BLD-MCNC: NEGATIVE MG/DL
CLARITY, POC: CLEAR
COLOR UR: YELLOW
GLUCOSE UR STRIP-MCNC: NEGATIVE MG/DL
KETONES UR QL: NEGATIVE
LEUKOCYTE EST, POC: ABNORMAL
NITRITE UR-MCNC: NEGATIVE MG/ML
PH UR: 6 [PH] (ref 5–8)
PROT UR STRIP-MCNC: NEGATIVE MG/DL
RBC # UR STRIP: ABNORMAL /UL
SP GR UR: 1.02 (ref 1–1.03)
UROBILINOGEN UR QL: NORMAL

## 2018-03-07 PROCEDURE — 51798 US URINE CAPACITY MEASURE: CPT | Performed by: UROLOGY

## 2018-03-07 PROCEDURE — 81003 URINALYSIS AUTO W/O SCOPE: CPT | Performed by: UROLOGY

## 2018-03-07 PROCEDURE — 99213 OFFICE O/P EST LOW 20 MIN: CPT | Performed by: UROLOGY

## 2018-03-07 RX ORDER — ALFUZOSIN HYDROCHLORIDE 10 MG/1
10 TABLET, EXTENDED RELEASE ORAL DAILY
COMMUNITY
Start: 2018-03-06 | End: 2018-04-27 | Stop reason: SDUPTHER

## 2018-03-07 RX ORDER — CLOPIDOGREL BISULFATE 75 MG/1
TABLET ORAL
Qty: 30 TABLET | Refills: 5 | Status: SHIPPED | OUTPATIENT
Start: 2018-03-07 | End: 2018-09-04 | Stop reason: SDUPTHER

## 2018-03-07 NOTE — PROGRESS NOTES
Chief Complaint  Nocturia (Patient is here for nocturia 6-7 times a night.)        NAYLA Pena is a 67 y.o. male who returns today for annual checkup primarily concerned about nocturia.  He's known to have obstructive sleep apnea and wears a CPAP device.  He's also had a lot of trouble with fluid retention in both his lower extremities and in his face today.  He hasn't seen his cardiologist in a while but has recently made an appointment to be reevaluated.  He continues to take his Uroxatral after he didn't like the side effects from Flomax.    Vitals:    03/07/18 1502   BP: 124/67   Pulse: 69   Resp: 16   Temp: 98.3 °F (36.8 °C)   SpO2: 95%       Past Medical History  Past Medical History:   Diagnosis Date   • Abnormal liver enzymes    • Acute bronchitis    • Anemia    • Anxiety    • Arthritis    • Atherosclerotic heart disease of native coronary artery without angina pectoris    • Atrial fibrillation    • Atypical chest pain    • Back pain    • BPH (benign prostatic hyperplasia)    • Cardiac pain    • Chronic bronchitis    • Chronic kidney disease    • COPD (chronic obstructive pulmonary disease)    • Degeneration of cervical intervertebral disc    • Depression    • Diabetes mellitus    • Diverticulosis    • Dyspnea    • Esophageal reflux    • Esophagitis    • Gastritis    • Hematuria    • Hemorrhoids    • Hiatal hernia    • History of arthritis    • History of myocardial infarction    • History of peripheral neuropathy    • History of ventricular septal defect    • History of ventricular septal defect    • Hyperlipidemia    • Hypertension    • Infectious diarrhea    • Infectious diarrhea    • Kyphosis, acquired    • Lumbar radiculopathy    • Mitral and aortic valve disease    • Myocardial infarction    • Nephrolithiasis    • Phimosis    • Respiratory failure    • Sleep apnea    • Stroke syndrome    • Stroke syndrome    • Urinary calculus    • Urinary tract infection    • Ventral hernia        Past Surgical  History  Past Surgical History:   Procedure Laterality Date   • HERNIA REPAIR     • SUPRAPUBIC CATHETER INSERTION      History of Percutaneous Catheter Placement Into Ureter   • THROAT SURGERY     • VSD REPAIR      History of VSD Repair By Patch Closure       Medications  has a current medication list which includes the following prescription(s): alfuzosin, atorvastatin, breo ellipta, budesonide, carvedilol, clonazepam, clopidogrel, escitalopram, finasteride, fluticasone, ipratropium-albuterol, losartan, nitroglycerin, oxybutynin, prednisone, proair hfa, and tamsulosin.      Allergies  Allergies   Allergen Reactions   • Milk-Related Compounds    • Mucomyst [Acetylcysteine] Other (See Comments)     bronchospasms   • Sulfa Antibiotics Other (See Comments), Nausea Only and Nausea And Vomiting       Social History  Social History     Social History Narrative       Family History  He has no family history of bladder or kidney cancer  He has no family history of kidney stones      AUA Symptom Score:      Review of Systems  Review of Systems    Physical Exam  Physical Exam   Constitutional: He is oriented to person, place, and time. He appears well-developed and well-nourished.   HENT:   Head: Normocephalic and atraumatic.   Neck: Normal range of motion.   Pulmonary/Chest: Effort normal. No respiratory distress.   Abdominal: Soft. He exhibits no distension and no mass. There is no tenderness. No hernia.   Genitourinary: Rectum normal and prostate normal.   Musculoskeletal: Normal range of motion. He exhibits edema.   Lymphadenopathy:     He has no cervical adenopathy.   Neurological: He is alert and oriented to person, place, and time.   Skin: Skin is warm and dry.   Psychiatric: He has a normal mood and affect. His behavior is normal.   Vitals reviewed.      Labs Recent and today in the office:  Results for orders placed or performed in visit on 03/07/18   POC Urinalysis Dipstick, Automated   Result Value Ref Range     Color Yellow Yellow, Straw, Dark Yellow, Olya    Clarity, UA Clear Clear    Glucose, UA Negative Negative, 1000 mg/dL (3+) mg/dL    Bilirubin Negative Negative    Ketones, UA Negative Negative    Specific Gravity  1.025 1.005 - 1.030    Blood, UA Trace (A) Negative    pH, Urine 6.0 5.0 - 8.0    Protein, POC Negative Negative mg/dL    Urobilinogen, UA Normal Normal    Leukocytes Small (1+) (A) Negative    Nitrite, UA Negative Negative     Bladder scan reveals 23.6 mL    Assessment & Plan  #1 nocturia: He has less than 1 ounce of postvoid residual today so this is not primarily of prostate problem he should continue his Uroxatral.  He is encouraged to reduce his salt intake at night and consult a urologist about further treatment for his fluid retention.  Digital rectal exam today is benign and he is a candidate for pellety therapy only.  He states he was recently hospitalized for a month for COPD and therefore he is not a candidate for surgery.  His prostate is somewhat enlarged so up recommended Proscar and Myrbetrique for his symptoms.    #2 phimosis and balanitis: He probably needs a dorsal slit circumcision electively but that has not his chief concern right now.

## 2018-03-19 ENCOUNTER — OFFICE VISIT (OUTPATIENT)
Dept: CARDIOLOGY | Facility: CLINIC | Age: 68
End: 2018-03-19

## 2018-03-19 VITALS
SYSTOLIC BLOOD PRESSURE: 124 MMHG | BODY MASS INDEX: 23.83 KG/M2 | DIASTOLIC BLOOD PRESSURE: 56 MMHG | HEIGHT: 67 IN | HEART RATE: 96 BPM | WEIGHT: 151.8 LBS

## 2018-03-19 DIAGNOSIS — I25.10 ATHEROSCLEROSIS OF NATIVE CORONARY ARTERY OF NATIVE HEART WITHOUT ANGINA PECTORIS: ICD-10-CM

## 2018-03-19 DIAGNOSIS — I50.22 CHRONIC SYSTOLIC CONGESTIVE HEART FAILURE (HCC): ICD-10-CM

## 2018-03-19 DIAGNOSIS — I08.0 MITRAL AND AORTIC VALVE DISEASE: ICD-10-CM

## 2018-03-19 DIAGNOSIS — I10 ESSENTIAL HYPERTENSION: Primary | ICD-10-CM

## 2018-03-19 DIAGNOSIS — E78.5 HYPERLIPIDEMIA, UNSPECIFIED HYPERLIPIDEMIA TYPE: ICD-10-CM

## 2018-03-19 PROCEDURE — 99213 OFFICE O/P EST LOW 20 MIN: CPT | Performed by: INTERNAL MEDICINE

## 2018-03-19 NOTE — PROGRESS NOTES
Thurman Mendel Parsons  1950  [phone]      VISIT DATE: 3/19/2018     PCP: Miguel Rojas MD  [address] [facility] [address]  [facility] [address]    IDENTIFICATION: A 67 y.o. male retired from [employer]     1. CAD  1. 1994, 2008- [facility] nonobstr  2. 2011 SE wnl  2. Abnl Echo  1. 2013 EF 45 w BBB  2. 1/3/18 echo: EF 45-50%, moderate LA enlargement, mitral thickening with mild MR, sclerosed aortic valve with mild AI, mild TR, apical segmental hypokinesis  3. CVD  1. B CEA -Huber remote  2. CUS [facility] 2014 nonobst  4. HTN  5. HL  1. 6/14 137/149/42/65  6. CKD  7. PEDRO  8. COPD  9. PFO closure Dr Alves    Allergies  Allergies   Allergen Reactions   • Milk-Related Compounds    • Mucomyst [Acetylcysteine] Other (See Comments)     bronchospasms   • Sulfa Antibiotics Other (See Comments), Nausea Only and Nausea And Vomiting       Current Medications    Current Outpatient Prescriptions:   •  alfuzosin (UROXATRAL) 10 MG 24 hr tablet, 10 mg Daily., Disp: , Rfl:   •  atorvastatin (LIPITOR) 40 MG tablet, Take 1 tablet by mouth Daily., Disp: 30 tablet, Rfl: 3  •  BREO ELLIPTA 200-25 MCG/INH aerosol powder , Inhale 1 each Daily., Disp: 60 each, Rfl: 5  •  budesonide (PULMICORT) 0.5 MG/2ML nebulizer solution, Take 2 mL by nebulization 2 (Two) Times a Day., Disp: , Rfl:   •  carvedilol (COREG) 3.125 MG tablet, TAKE ONE TABLET BY MOUTH TWICE A DAY WITH MEALS (Patient taking differently: TAKE ONE TABLET BY MOUTH TWICE A DAY WITH MEALS      6.25 bid), Disp: 60 tablet, Rfl: 2  •  clonazePAM (KlonoPIN) 0.5 MG tablet, Take 0.5 tablets by mouth At Night As Needed for Anxiety., Disp: 5 tablet, Rfl: 0  •  clopidogrel (PLAVIX) 75 MG tablet, TAKE ONE TABLET BY MOUTH DAILY, Disp: 30 tablet, Rfl: 5  •  escitalopram (LEXAPRO) 10 MG tablet, Take 1 tablet by mouth Daily. (Patient taking differently: Take 10 mg by mouth Every Evening.), Disp: 90 tablet, Rfl: 1  •  fluticasone (FLONASE) 50 MCG/ACT nasal spray, 1 spray into each nostril Daily., Disp: 16 g,  Rfl: 5  •  ipratropium-albuterol (DUO-NEB) 0.5-2.5 mg/mL nebulizer, Take 3 mL by nebulization Every 6 (Six) Hours., Disp: 360 mL, Rfl:   •  losartan (COZAAR) 100 MG tablet, 100 mg Daily., Disp: , Rfl:   •  nitroglycerin (NITROSTAT) 0.4 MG SL tablet, Place  under the tongue., Disp: , Rfl:   •  O2 (OXYGEN), Inhale 4 L/min 1 (One) Time. Walking on 4mg and sitting 3mg, Disp: , Rfl:   •  oxybutynin (DITROPAN) 5 MG tablet, TAKE ONE TABLET BY MOUTH AT BEDTIME, Disp: 30 tablet, Rfl: 5  •  predniSONE (DELTASONE) 10 MG tablet, Take 10 mg by mouth Daily., Disp: , Rfl:   •  PROAIR  (90 BASE) MCG/ACT inhaler, Inhale 2 puffs Every 4 (Four) Hours As Needed for Wheezing or Shortness of Air., Disp: , Rfl:   •  tamsulosin (FLOMAX) 0.4 MG capsule 24 hr capsule, Take 1 capsule by mouth Every Night., Disp: 30 capsule, Rfl:     History of Present Illness     Pt denies any new chest pain, dyspnea, dyspnea on exertion, orthopnea, PND, palpitations, lower extremity edema.He was hospitalized at [facility] for 2 weeks following [place] for acute on chronic respiratory failure.     ROS:  All systems have been reviewed and are negative with the exception of those mentioned in the HPI.    OBJECTIVE:  130/68 hr 72 wt 140  Physical Exam   Constitutional: He appears well-developed and well-nourished.   Neck: Normal range of motion. Neck supple. No hepatojugular reflux and no JVD present. Carotid bruit is not present. No tracheal deviation present. No thyromegaly present.   Cardiovascular: Normal rate, regular rhythm, S1 normal, S2 normal, intact distal pulses and normal pulses.  PMI is not displaced.  Exam reveals no gallop, no distant heart sounds, no friction rub, no midsystolic click and no opening snap.    Murmur heard.  Pulses:       Radial pulses are 2+ on the right side, and 2+ on the left side.        Dorsalis pedis pulses are 2+ on the right side, and 2+ on the left side.        Posterior tibial pulses are 2+ on the right side, and 2+ on  the left side.   Pulmonary/Chest: He is in respiratory distress. He has wheezes. He has no rales.   Abdominal: Soft. Bowel sounds are normal. He exhibits no mass. There is no tenderness. There is no guarding.       Diagnostic Data:  Procedures      ASSESSMENT:   Diagnosis Plan   1. Essential hypertension     2. Hyperlipidemia, unspecified hyperlipidemia type     3. Atherosclerosis of native coronary artery of native heart without angina pectoris     4. Mitral and aortic valve disease     5. Chronic systolic congestive heart failure         PLAN:  COPD with chronic hypoxemia on high flow nasal cannula at baseline.  No apparent right heart failure at current clinically.    LV dysfunction mild with stage III CHF at current no necessity for diuresis.  Echo in January unchanged from previous.    Dyslipidemia on statin therapy    Follow-up 6-9 months sooner if needed    Miguel Rojas MD, thank you for referring Mr. Pena for evaluation.  I have forwarded my electronically generated recommendations to you for review.  Please do not hesitate to call with any questions.    Scribed for Ulysses Bass MD by Shawanda Swain PA-C. 3/19/2018  1:48 PM  I, Ulysses Bass MD, personally performed the services described in this documentation as scribed by the above named individual in my presence, and it is both accurate and complete.  3/20/2018  7:04 AM    Ulysses Bass MD, [facility]

## 2018-03-20 DIAGNOSIS — N32.81 OAB (OVERACTIVE BLADDER): ICD-10-CM

## 2018-03-21 RX ORDER — PREDNISONE 10 MG/1
TABLET ORAL
Qty: 30 TABLET | Refills: 0 | Status: SHIPPED | OUTPATIENT
Start: 2018-03-21 | End: 2018-04-17 | Stop reason: SDUPTHER

## 2018-03-21 RX ORDER — OXYBUTYNIN CHLORIDE 5 MG/1
TABLET ORAL
Qty: 30 TABLET | Refills: 0 | Status: SHIPPED | OUTPATIENT
Start: 2018-03-21 | End: 2018-04-17 | Stop reason: SDUPTHER

## 2018-03-27 ENCOUNTER — TELEPHONE (OUTPATIENT)
Dept: INTERNAL MEDICINE | Facility: CLINIC | Age: 68
End: 2018-03-27

## 2018-03-27 RX ORDER — ATORVASTATIN CALCIUM 40 MG/1
TABLET, FILM COATED ORAL
Qty: 30 TABLET | Refills: 0 | Status: SHIPPED | OUTPATIENT
Start: 2018-03-27 | End: 2018-04-30 | Stop reason: SDUPTHER

## 2018-03-27 RX ORDER — SPIRONOLACTONE 25 MG/1
TABLET ORAL
Qty: 15 TABLET | Refills: 2 | OUTPATIENT
Start: 2018-03-27

## 2018-03-27 RX ORDER — LOSARTAN POTASSIUM 100 MG/1
100 TABLET ORAL DAILY
Qty: 30 TABLET | Refills: 3 | Status: SHIPPED | OUTPATIENT
Start: 2018-03-27 | End: 2018-05-25 | Stop reason: HOSPADM

## 2018-03-29 RX ORDER — SPIRONOLACTONE 25 MG/1
TABLET ORAL
COMMUNITY
Start: 2018-02-27 | End: 2018-03-29 | Stop reason: SDUPTHER

## 2018-03-29 RX ORDER — SPIRONOLACTONE 25 MG/1
25 TABLET ORAL DAILY
Qty: 30 TABLET | Refills: 0 | Status: SHIPPED | OUTPATIENT
Start: 2018-03-29 | End: 2018-06-04 | Stop reason: SDUPTHER

## 2018-03-29 NOTE — TELEPHONE ENCOUNTER
Refill request: [facility]    Patient states that his pharmacy told him that the prescription was denied. He states that he has taken it for years and that noone had mentioned for him stop taking it. He will be out in a couple of days.    Spironolactone

## 2018-04-10 RX ORDER — ALPRAZOLAM 0.5 MG/1
TABLET ORAL
Qty: 30 TABLET | Refills: 3 | Status: SHIPPED | OUTPATIENT
Start: 2018-04-10 | End: 2018-06-12 | Stop reason: SDUPTHER

## 2018-04-13 ENCOUNTER — OUTSIDE FACILITY SERVICE (OUTPATIENT)
Dept: INTERNAL MEDICINE | Facility: CLINIC | Age: 68
End: 2018-04-13

## 2018-04-13 PROCEDURE — G0179 MD RECERTIFICATION HHA PT: HCPCS | Performed by: INTERNAL MEDICINE

## 2018-04-17 DIAGNOSIS — N32.81 OAB (OVERACTIVE BLADDER): ICD-10-CM

## 2018-04-17 RX ORDER — PREDNISONE 10 MG/1
TABLET ORAL
Qty: 30 TABLET | Refills: 0 | Status: SHIPPED | OUTPATIENT
Start: 2018-04-17 | End: 2018-05-14 | Stop reason: SDUPTHER

## 2018-04-17 RX ORDER — OXYBUTYNIN CHLORIDE 5 MG/1
TABLET ORAL
Qty: 30 TABLET | Refills: 0 | Status: SHIPPED | OUTPATIENT
Start: 2018-04-17 | End: 2018-05-14 | Stop reason: SDUPTHER

## 2018-04-24 RX ORDER — CARVEDILOL 3.12 MG/1
TABLET ORAL
Qty: 60 TABLET | Refills: 0 | Status: SHIPPED | OUTPATIENT
Start: 2018-04-24 | End: 2018-05-21 | Stop reason: SDUPTHER

## 2018-04-27 RX ORDER — ALFUZOSIN HYDROCHLORIDE 10 MG/1
10 TABLET, EXTENDED RELEASE ORAL DAILY
Qty: 30 TABLET | Refills: 0 | Status: SHIPPED | OUTPATIENT
Start: 2018-04-27 | End: 2018-05-29 | Stop reason: SDUPTHER

## 2018-05-01 ENCOUNTER — TELEPHONE (OUTPATIENT)
Dept: INTERNAL MEDICINE | Facility: CLINIC | Age: 68
End: 2018-05-01

## 2018-05-01 RX ORDER — ESCITALOPRAM OXALATE 10 MG/1
TABLET ORAL
Qty: 30 TABLET | Refills: 2 | OUTPATIENT
Start: 2018-05-01

## 2018-05-01 RX ORDER — ATORVASTATIN CALCIUM 40 MG/1
TABLET, FILM COATED ORAL
Qty: 30 TABLET | Refills: 5 | Status: SHIPPED | OUTPATIENT
Start: 2018-05-01 | End: 2018-10-22 | Stop reason: SDUPTHER

## 2018-05-01 NOTE — TELEPHONE ENCOUNTER
Silvia Quiroga OT from [facility] called regarding Mr. Pena. She states that she just wanted to let you know that she is discharging the patient from home health with all goals met.      If you have any questions please contact Silvia Quiroga [phone]

## 2018-05-04 RX ORDER — ESCITALOPRAM OXALATE 10 MG/1
10 TABLET ORAL DAILY
Qty: 90 TABLET | Refills: 3 | Status: SHIPPED | OUTPATIENT
Start: 2018-05-04 | End: 2019-04-02 | Stop reason: SDUPTHER

## 2018-05-14 DIAGNOSIS — N32.81 OAB (OVERACTIVE BLADDER): ICD-10-CM

## 2018-05-17 RX ORDER — OXYBUTYNIN CHLORIDE 5 MG/1
TABLET ORAL
Qty: 30 TABLET | Refills: 2 | Status: SHIPPED | OUTPATIENT
Start: 2018-05-17 | End: 2018-08-07 | Stop reason: SDUPTHER

## 2018-05-17 RX ORDER — PREDNISONE 10 MG/1
TABLET ORAL
Qty: 30 TABLET | Refills: 2 | Status: SHIPPED | OUTPATIENT
Start: 2018-05-17 | End: 2018-08-20 | Stop reason: SDUPTHER

## 2018-05-22 ENCOUNTER — OFFICE VISIT (OUTPATIENT)
Dept: INTERNAL MEDICINE | Facility: CLINIC | Age: 68
End: 2018-05-22

## 2018-05-22 ENCOUNTER — TELEPHONE (OUTPATIENT)
Dept: CARDIOLOGY | Facility: CLINIC | Age: 68
End: 2018-05-22

## 2018-05-22 VITALS
RESPIRATION RATE: 14 BRPM | HEART RATE: 74 BPM | SYSTOLIC BLOOD PRESSURE: 128 MMHG | TEMPERATURE: 98.1 F | BODY MASS INDEX: 25.27 KG/M2 | HEIGHT: 67 IN | WEIGHT: 161 LBS | OXYGEN SATURATION: 92 % | DIASTOLIC BLOOD PRESSURE: 62 MMHG

## 2018-05-22 DIAGNOSIS — K21.9 GERD WITHOUT ESOPHAGITIS: ICD-10-CM

## 2018-05-22 DIAGNOSIS — I25.10 ATHEROSCLEROSIS OF NATIVE CORONARY ARTERY OF NATIVE HEART WITHOUT ANGINA PECTORIS: Primary | ICD-10-CM

## 2018-05-22 DIAGNOSIS — I10 ESSENTIAL HYPERTENSION: ICD-10-CM

## 2018-05-22 DIAGNOSIS — N18.30 CHRONIC KIDNEY DISEASE, STAGE III (MODERATE) (HCC): ICD-10-CM

## 2018-05-22 DIAGNOSIS — J44.9 STEROID-DEPENDENT COPD (HCC): ICD-10-CM

## 2018-05-22 DIAGNOSIS — E78.5 HYPERLIPIDEMIA, UNSPECIFIED HYPERLIPIDEMIA TYPE: ICD-10-CM

## 2018-05-22 DIAGNOSIS — E11.8 TYPE 2 DIABETES MELLITUS WITH COMPLICATION, WITHOUT LONG-TERM CURRENT USE OF INSULIN (HCC): ICD-10-CM

## 2018-05-22 DIAGNOSIS — I50.22 CHRONIC SYSTOLIC CONGESTIVE HEART FAILURE (HCC): ICD-10-CM

## 2018-05-22 DIAGNOSIS — N40.0 ENLARGED PROSTATE WITHOUT LOWER URINARY TRACT SYMPTOMS (LUTS): ICD-10-CM

## 2018-05-22 DIAGNOSIS — I48.91 ATRIAL FIBRILLATION, UNSPECIFIED TYPE (HCC): ICD-10-CM

## 2018-05-22 DIAGNOSIS — G47.30 SLEEP APNEA, UNSPECIFIED TYPE: ICD-10-CM

## 2018-05-22 LAB
ALBUMIN SERPL-MCNC: 4 G/DL (ref 3.5–5)
ALBUMIN/GLOB SERPL: 1.6 G/DL (ref 1–2)
ALP SERPL-CCNC: 78 U/L (ref 38–126)
ALT SERPL-CCNC: 27 U/L (ref 13–69)
AST SERPL-CCNC: 21 U/L (ref 15–46)
BASOPHILS # BLD AUTO: 0.04 10*3/MM3 (ref 0–0.2)
BASOPHILS NFR BLD AUTO: 0.5 % (ref 0–2.5)
BILIRUB SERPL-MCNC: 1 MG/DL (ref 0.2–1.3)
BUN SERPL-MCNC: 31 MG/DL (ref 7–20)
BUN/CREAT SERPL: 28.2 (ref 6.3–21.9)
CALCIUM SERPL-MCNC: 10 MG/DL (ref 8.4–10.2)
CHLORIDE SERPL-SCNC: 101 MMOL/L (ref 98–107)
CO2 SERPL-SCNC: 36 MMOL/L (ref 26–30)
CREAT SERPL-MCNC: 1.1 MG/DL (ref 0.6–1.3)
EOSINOPHIL # BLD AUTO: 0.21 10*3/MM3 (ref 0–0.7)
EOSINOPHIL NFR BLD AUTO: 2.6 % (ref 0–7)
ERYTHROCYTE [DISTWIDTH] IN BLOOD BY AUTOMATED COUNT: 13.1 % (ref 11.5–14.5)
GFR SERPLBLD CREATININE-BSD FMLA CKD-EPI: 67 ML/MIN/1.73
GFR SERPLBLD CREATININE-BSD FMLA CKD-EPI: 81 ML/MIN/1.73
GLOBULIN SER CALC-MCNC: 2.5 GM/DL
GLUCOSE SERPL-MCNC: 84 MG/DL (ref 74–98)
HCT VFR BLD AUTO: 38.4 % (ref 42–52)
HGB BLD-MCNC: 12.2 G/DL (ref 14–18)
IMM GRANULOCYTES # BLD: 0.03 10*3/MM3 (ref 0–0.06)
IMM GRANULOCYTES NFR BLD: 0.4 % (ref 0–0.6)
LYMPHOCYTES # BLD AUTO: 1.31 10*3/MM3 (ref 0.6–3.4)
LYMPHOCYTES NFR BLD AUTO: 16.3 % (ref 10–50)
MCH RBC QN AUTO: 30.5 PG (ref 27–31)
MCHC RBC AUTO-ENTMCNC: 31.8 G/DL (ref 30–37)
MCV RBC AUTO: 96 FL (ref 80–94)
MONOCYTES # BLD AUTO: 0.82 10*3/MM3 (ref 0–0.9)
MONOCYTES NFR BLD AUTO: 10.2 % (ref 0–12)
NEUTROPHILS # BLD AUTO: 5.64 10*3/MM3 (ref 2–6.9)
NEUTROPHILS NFR BLD AUTO: 70 % (ref 37–80)
NRBC BLD AUTO-RTO: 0 /100 WBC (ref 0–0)
PLATELET # BLD AUTO: 200 10*3/MM3 (ref 130–400)
POTASSIUM SERPL-SCNC: 3.9 MMOL/L (ref 3.5–5.1)
PROT SERPL-MCNC: 6.5 G/DL (ref 6.3–8.2)
RBC # BLD AUTO: 4 10*6/MM3 (ref 4.7–6.1)
SODIUM SERPL-SCNC: 145 MMOL/L (ref 137–145)
TSH SERPL DL<=0.005 MIU/L-ACNC: 1.15 MIU/ML (ref 0.47–4.68)
WBC # BLD AUTO: 8.05 10*3/MM3 (ref 4.8–10.8)

## 2018-05-22 PROCEDURE — 99214 OFFICE O/P EST MOD 30 MIN: CPT | Performed by: INTERNAL MEDICINE

## 2018-05-22 RX ORDER — BACLOFEN 10 MG/1
10 TABLET ORAL 3 TIMES DAILY
Qty: 30 TABLET | Refills: 1 | Status: SHIPPED | OUTPATIENT
Start: 2018-05-22 | End: 2018-06-11 | Stop reason: SDUPTHER

## 2018-05-22 RX ORDER — CARVEDILOL 3.12 MG/1
TABLET ORAL
Qty: 60 TABLET | Refills: 3 | Status: SHIPPED | OUTPATIENT
Start: 2018-05-22 | End: 2018-05-25 | Stop reason: HOSPADM

## 2018-05-22 RX ORDER — PHENOL 1.4 %
600 AEROSOL, SPRAY (ML) MUCOUS MEMBRANE 2 TIMES DAILY WITH MEALS
COMMUNITY
End: 2019-08-31 | Stop reason: HOSPADM

## 2018-05-22 RX ORDER — FINASTERIDE 5 MG/1
5 TABLET, FILM COATED ORAL DAILY
COMMUNITY
End: 2019-03-25 | Stop reason: SDUPTHER

## 2018-05-22 RX ORDER — MULTIPLE VITAMINS W/ MINERALS TAB 9MG-400MCG
1 TAB ORAL DAILY
COMMUNITY
End: 2018-05-22

## 2018-05-22 RX ORDER — IRON POLYSACCHARIDE COMPLEX 150 MG
150 CAPSULE ORAL 2 TIMES DAILY
COMMUNITY
End: 2019-08-31 | Stop reason: HOSPADM

## 2018-05-22 RX ORDER — CYCLOBENZAPRINE HCL 10 MG
10 TABLET ORAL 3 TIMES DAILY PRN
COMMUNITY
End: 2018-05-22

## 2018-05-22 RX ORDER — FUROSEMIDE 20 MG/1
20 TABLET ORAL DAILY
Qty: 30 TABLET | Refills: 1 | Status: SHIPPED | OUTPATIENT
Start: 2018-05-22 | End: 2018-06-12 | Stop reason: SDUPTHER

## 2018-05-22 NOTE — TELEPHONE ENCOUNTER
Called patient back again an unable to reach. Left patient VM to call back at his convenience to discuss need for f/u or have Dr Rojas office call to set up earlier appt.

## 2018-05-22 NOTE — PROGRESS NOTES
Subjective   Thurman Mendel Parsons is a 68 y.o. male.     Chief Complaint   Patient presents with   • Pain     neck and shoulder        History of Present Illness   Patient here for follow-up.  Complaining weight gain swelling in legs and also face.  Occasional chest pain.  Patient does have chronica congestive heart failure.  Patient is also drinking Gatorade Mountain Dew.  Patient is on spironolactone.  Patient also complains neck pain certain position worse physical therapy no help patient has dyspnea Pain chronically.  COPD stable on medication.  Diabetes is stable.  Hypertension stable on medication.  Depression stable medication.      Current Outpatient Prescriptions:   •  alfuzosin (UROXATRAL) 10 MG 24 hr tablet, Take 1 tablet by mouth Daily., Disp: 30 tablet, Rfl: 0  •  ALPRAZolam (XANAX) 0.5 MG tablet, TAKE ONE TABLET BY MOUTH EVERY NIGHT AT BEDTIME AS NEEDED FOR ANXIETY, Disp: 30 tablet, Rfl: 3  •  atorvastatin (LIPITOR) 40 MG tablet, TAKE ONE TABLET BY MOUTH EVERY NIGHT AT BEDTIME, Disp: 30 tablet, Rfl: 5  •  BREO ELLIPTA 200-25 MCG/INH aerosol powder , Inhale 1 each Daily., Disp: 60 each, Rfl: 5  •  budesonide (PULMICORT) 0.5 MG/2ML nebulizer solution, Take 2 mL by nebulization 2 (Two) Times a Day., Disp: , Rfl:   •  calcium carbonate (OS-HAFSA) 600 MG tablet, Take 600 mg by mouth 2 (Two) Times a Day With Meals., Disp: , Rfl:   •  carvedilol (COREG) 3.125 MG tablet, TAKE ONE TABLET BY MOUTH TWICE A DAY, Disp: 60 tablet, Rfl: 3  •  clopidogrel (PLAVIX) 75 MG tablet, TAKE ONE TABLET BY MOUTH DAILY, Disp: 30 tablet, Rfl: 5  •  cyclobenzaprine (FLEXERIL) 10 MG tablet, Take 10 mg by mouth 3 (Three) Times a Day As Needed for Muscle Spasms., Disp: , Rfl:   •  escitalopram (LEXAPRO) 10 MG tablet, Take 1 tablet by mouth Daily., Disp: 90 tablet, Rfl: 3  •  finasteride (PROSCAR) 5 MG tablet, Take 5 mg by mouth Daily., Disp: , Rfl:   •  fluticasone (FLONASE) 50 MCG/ACT nasal spray, 1 spray into each nostril Daily.,  Disp: 16 g, Rfl: 5  •  ipratropium-albuterol (DUO-NEB) 0.5-2.5 mg/mL nebulizer, Take 3 mL by nebulization Every 6 (Six) Hours., Disp: 360 mL, Rfl:   •  iron polysaccharides (NIFEREX) 150 MG capsule, Take 150 mg by mouth 2 (Two) Times a Day., Disp: , Rfl:   •  losartan (COZAAR) 100 MG tablet, Take 1 tablet by mouth Daily., Disp: 30 tablet, Rfl: 3  •  Multiple Vitamins-Minerals (MULTIVITAMIN WITH MINERALS) tablet tablet, Take 1 tablet by mouth Daily., Disp: , Rfl:   •  nitroglycerin (NITROSTAT) 0.4 MG SL tablet, Place  under the tongue., Disp: , Rfl:   •  O2 (OXYGEN), Inhale 4 L/min 1 (One) Time. Walking on 4mg and sitting 3mg, Disp: , Rfl:   •  oxybutynin (DITROPAN) 5 MG tablet, TAKE ONE TABLET BY MOUTH EVERY NIGHT AT BEDTIME, Disp: 30 tablet, Rfl: 2  •  predniSONE (DELTASONE) 10 MG tablet, TAKE ONE TABLET BY MOUTH DAILY, Disp: 30 tablet, Rfl: 2  •  PROAIR  (90 BASE) MCG/ACT inhaler, Inhale 2 puffs Every 4 (Four) Hours As Needed for Wheezing or Shortness of Air., Disp: , Rfl:   •  spironolactone (ALDACTONE) 25 MG tablet, Take 1 tablet by mouth Daily., Disp: 30 tablet, Rfl: 0    The following portions of the patient's history were reviewed and updated as appropriate: allergies, current medications, past family history, past medical history, past social history, past surgical history and problem list.    Review of Systems   Constitutional: Negative.    Respiratory: Negative.    Cardiovascular: Negative.    Gastrointestinal: Negative.    Musculoskeletal: Positive for neck pain.   Skin: Negative.    Neurological: Negative.    Psychiatric/Behavioral: Negative.        Objective   Physical Exam   Constitutional: He is oriented to person, place, and time. He appears well-nourished.   Neck: Neck supple.   Cardiovascular: Normal rate, regular rhythm and normal heart sounds.    Pulmonary/Chest: Effort normal and breath sounds normal.   Abdominal: Bowel sounds are normal.   Musculoskeletal: He exhibits tenderness.    Neurological: He is alert and oriented to person, place, and time.   Skin: Skin is warm.   Psychiatric: He has a normal mood and affect.       All tests have been reviewed.    Assessment/Plan   There are no diagnoses linked to this encounter.           COPD continue inhaler chronic prednisone 10mg. Follow up with lung doctor this PM, continue meds and O2 on rehab  Dysphagia follow GI  Left neck pain continue baclofen tylenol and PT  osteopenia, oscal D 500mg bid continue  Weight loss  watch  left low back pain follow up with ortho s/p left hip surgery for severe OA doing well.   alkaline phosphatase still high watch---  divertic hemorroid on colon 1/2013  DM stable without med  phimosis seen by uro declined surgery  Hematuria followup with urologist s/p cystoscope bladder stones  Hypertension continue med  BPH enlarged on CT scan follow up with urologist.  Anemia workup all negative.? anemia of chronic disease due to chronic disease   CKD follow up with kidney doctor  History of the CAD atrial fibrillation VSD status post occlusion stable now. Status post a defibrillator seen by cardio follow up with cardio, occa chest pain patient to call cardio today  History of a TIA patient is on Plavix continue  pneumovax done, zostavax to pharmacy, prevnar 13 1/19/17, Td to HD.flushot done  Depression cont lexapro 20  Decline to disrobe for PE  right inquinal hernia decline surgery now, call if worse  BLE edema mild, watch for now, cut down salt, avoid soda  Neck pain chiro for now  Do lab in a week   3week and due for  wellnexx

## 2018-05-22 NOTE — TELEPHONE ENCOUNTER
Called patient back and left VM to call back at his convenience to discuss why Dr Rojas wanted him to call us today

## 2018-05-23 ENCOUNTER — HOSPITAL ENCOUNTER (EMERGENCY)
Facility: HOSPITAL | Age: 68
Discharge: HOME | End: 2018-05-23
Attending: STUDENT IN AN ORGANIZED HEALTH CARE EDUCATION/TRAINING PROGRAM | Admitting: STUDENT IN AN ORGANIZED HEALTH CARE EDUCATION/TRAINING PROGRAM

## 2018-05-23 ENCOUNTER — APPOINTMENT (OUTPATIENT)
Dept: GENERAL RADIOLOGY | Facility: HOSPITAL | Age: 68
End: 2018-05-23

## 2018-05-23 VITALS
HEART RATE: 78 BPM | SYSTOLIC BLOOD PRESSURE: 152 MMHG | OXYGEN SATURATION: 97 % | HEIGHT: 68 IN | BODY MASS INDEX: 23.79 KG/M2 | TEMPERATURE: 98 F | DIASTOLIC BLOOD PRESSURE: 83 MMHG | WEIGHT: 157 LBS | RESPIRATION RATE: 18 BRPM

## 2018-05-23 DIAGNOSIS — I20.0 UNSTABLE ANGINA (HCC): Primary | ICD-10-CM

## 2018-05-23 LAB
ALBUMIN SERPL-MCNC: 4 G/DL (ref 3.5–5)
ALBUMIN/GLOB SERPL: 1.7 G/DL (ref 1–2)
ALP SERPL-CCNC: 73 U/L (ref 38–126)
ALT SERPL W P-5'-P-CCNC: 29 U/L (ref 13–69)
ANION GAP SERPL CALCULATED.3IONS-SCNC: 9.7 MMOL/L (ref 10–20)
AST SERPL-CCNC: 21 U/L (ref 15–46)
BASOPHILS # BLD AUTO: 0.04 10*3/MM3 (ref 0–0.2)
BASOPHILS NFR BLD AUTO: 0.3 % (ref 0–2.5)
BILIRUB SERPL-MCNC: 0.9 MG/DL (ref 0.2–1.3)
BUN BLD-MCNC: 42 MG/DL (ref 7–20)
BUN/CREAT SERPL: 32.3 (ref 6.3–21.9)
CALCIUM SPEC-SCNC: 9.9 MG/DL (ref 8.4–10.2)
CHLORIDE SERPL-SCNC: 101 MMOL/L (ref 98–107)
CO2 SERPL-SCNC: 36 MMOL/L (ref 26–30)
CREAT BLD-MCNC: 1.3 MG/DL (ref 0.6–1.3)
DEPRECATED RDW RBC AUTO: 45.1 FL (ref 37–54)
EOSINOPHIL # BLD AUTO: 0.08 10*3/MM3 (ref 0–0.7)
EOSINOPHIL NFR BLD AUTO: 0.7 % (ref 0–7)
ERYTHROCYTE [DISTWIDTH] IN BLOOD BY AUTOMATED COUNT: 13.2 % (ref 11.5–14.5)
GFR SERPL CREATININE-BSD FRML MDRD: 55 ML/MIN/1.73
GLOBULIN UR ELPH-MCNC: 2.4 GM/DL
GLUCOSE BLD-MCNC: 79 MG/DL (ref 74–98)
HCT VFR BLD AUTO: 36.9 % (ref 42–52)
HGB BLD-MCNC: 12 G/DL (ref 14–18)
HOLD SPECIMEN: NORMAL
HOLD SPECIMEN: NORMAL
IMM GRANULOCYTES # BLD: 0.03 10*3/MM3 (ref 0–0.06)
IMM GRANULOCYTES NFR BLD: 0.3 % (ref 0–0.6)
LYMPHOCYTES # BLD AUTO: 1.31 10*3/MM3 (ref 0.6–3.4)
LYMPHOCYTES NFR BLD AUTO: 11.2 % (ref 10–50)
MCH RBC QN AUTO: 30.7 PG (ref 27–31)
MCHC RBC AUTO-ENTMCNC: 32.5 G/DL (ref 30–37)
MCV RBC AUTO: 94.4 FL (ref 80–94)
MONOCYTES # BLD AUTO: 1.1 10*3/MM3 (ref 0–0.9)
MONOCYTES NFR BLD AUTO: 9.4 % (ref 0–12)
NEUTROPHILS # BLD AUTO: 9.11 10*3/MM3 (ref 2–6.9)
NEUTROPHILS NFR BLD AUTO: 78.1 % (ref 37–80)
NRBC BLD MANUAL-RTO: 0 /100 WBC (ref 0–0)
PLATELET # BLD AUTO: 191 10*3/MM3 (ref 130–400)
PMV BLD AUTO: 8.6 FL (ref 6–12)
POTASSIUM BLD-SCNC: 3.7 MMOL/L (ref 3.5–5.1)
PROT SERPL-MCNC: 6.4 G/DL (ref 6.3–8.2)
RBC # BLD AUTO: 3.91 10*6/MM3 (ref 4.7–6.1)
SODIUM BLD-SCNC: 143 MMOL/L (ref 137–145)
TROPONIN I SERPL-MCNC: 0.01 NG/ML (ref 0–0.05)
TROPONIN I SERPL-MCNC: 0.05 NG/ML (ref 0–0.03)
TROPONIN I SERPL-MCNC: 0.05 NG/ML (ref 0–0.03)
WBC NRBC COR # BLD: 11.67 10*3/MM3 (ref 4.8–10.8)
WHOLE BLOOD HOLD SPECIMEN: NORMAL
WHOLE BLOOD HOLD SPECIMEN: NORMAL

## 2018-05-23 PROCEDURE — 71046 X-RAY EXAM CHEST 2 VIEWS: CPT

## 2018-05-23 PROCEDURE — 85025 COMPLETE CBC W/AUTO DIFF WBC: CPT | Performed by: STUDENT IN AN ORGANIZED HEALTH CARE EDUCATION/TRAINING PROGRAM

## 2018-05-23 PROCEDURE — 80053 COMPREHEN METABOLIC PANEL: CPT | Performed by: STUDENT IN AN ORGANIZED HEALTH CARE EDUCATION/TRAINING PROGRAM

## 2018-05-23 PROCEDURE — 99285 EMERGENCY DEPT VISIT HI MDM: CPT

## 2018-05-23 PROCEDURE — 93005 ELECTROCARDIOGRAM TRACING: CPT | Performed by: STUDENT IN AN ORGANIZED HEALTH CARE EDUCATION/TRAINING PROGRAM

## 2018-05-23 PROCEDURE — 84484 ASSAY OF TROPONIN QUANT: CPT | Performed by: STUDENT IN AN ORGANIZED HEALTH CARE EDUCATION/TRAINING PROGRAM

## 2018-05-23 RX ORDER — ASPIRIN 325 MG
325 TABLET ORAL ONCE
Status: COMPLETED | OUTPATIENT
Start: 2018-05-23 | End: 2018-05-23

## 2018-05-23 RX ORDER — SODIUM CHLORIDE 0.9 % (FLUSH) 0.9 %
10 SYRINGE (ML) INJECTION AS NEEDED
Status: DISCONTINUED | OUTPATIENT
Start: 2018-05-23 | End: 2018-05-23 | Stop reason: HOSPADM

## 2018-05-23 RX ADMIN — ASPIRIN 325 MG ORAL TABLET 325 MG: 325 PILL ORAL at 16:23

## 2018-05-23 NOTE — DISCHARGE INSTRUCTIONS
Dr. Bass's office has agreed to see her tomorrow.  They were giving her information to contact you with an appointment.

## 2018-05-23 NOTE — ED PROVIDER NOTES
Subjective   Patient is a 68-year-old male who presents from pulmonary rehabilitation after having an episode of chest pain.  The patient states that he was on the exercise bicycle and completed his 15 minute workout.  He states he was at 15 minutes and 27 seconds and decided to go for a few more minutes.  When he did this he began to have chest pain, became sweaty, and vomited.  Patient reports that he did have chest pain a couple days ago that he took 1 nitroglycerin for which did relieve his pain he was comfortable the rest of the day.  He did talk to his primary care doctor in yesterday and they have contacted his cardiologist's office and attempts to get him an appointment.  States the chest pain was a chest pressure that was moderate to severe in intensity.  He did not use nitroglycerin today and it went away on its own.            Review of Systems   All other systems reviewed and are negative.      Past Medical History:   Diagnosis Date   • Abnormal liver enzymes    • Acute bronchitis    • Anemia    • Anxiety    • Arthritis    • Atherosclerotic heart disease of native coronary artery without angina pectoris    • Atrial fibrillation    • Atypical chest pain    • Back pain    • BPH (benign prostatic hyperplasia)    • Cardiac pain    • CHF (congestive heart failure)    • Chronic bronchitis    • Chronic kidney disease    • COPD (chronic obstructive pulmonary disease)    • Degeneration of cervical intervertebral disc    • Depression    • Diabetes mellitus    • Diverticulosis    • Dyspnea    • Esophageal reflux    • Esophagitis    • Gastritis    • Hematuria    • Hemorrhoids    • Hiatal hernia    • History of arthritis    • History of myocardial infarction    • History of peripheral neuropathy    • History of ventricular septal defect    • History of ventricular septal defect    • Hyperlipidemia    • Hypertension    • Infectious diarrhea    • Infectious diarrhea    • Kyphosis, acquired    • Lumbar radiculopathy     • Mitral and aortic valve disease    • Myocardial infarction    • Nephrolithiasis    • Phimosis    • Respiratory failure    • Sleep apnea    • Stroke syndrome    • Stroke syndrome    • Urinary calculus    • Urinary tract infection    • Ventral hernia        Allergies   Allergen Reactions   • Milk-Related Compounds    • Mucomyst [Acetylcysteine] Other (See Comments)     bronchospasms   • Sulfa Antibiotics Other (See Comments), Nausea Only and Nausea And Vomiting       Past Surgical History:   Procedure Laterality Date   • HERNIA REPAIR     • SUPRAPUBIC CATHETER INSERTION      History of Percutaneous Catheter Placement Into Ureter   • THROAT SURGERY     • VSD REPAIR      History of VSD Repair By Patch Closure       Family History   Problem Relation Age of Onset   • Other Father         CABG   • Coronary artery disease Father        Social History     Social History   • Marital status:      Occupational History   •  Retired     Social History Main Topics   • Smoking status: Former Smoker     Quit date: 2017   • Smokeless tobacco: Never Used   • Alcohol use No   • Drug use: No   • Sexual activity: Defer     Other Topics Concern   • Not on file           Objective   Physical Exam   Nursing note and vitals reviewed.    GEN: No acute distress, wearing oxygen via nasal cannula  Head: Normocephalic, atraumatic  Eyes: Pupils equal round reactive to light  ENT: Posterior pharynx normal in appearance, oral mucosa is moist  Chest: Nontender to palpation  Cardiovascular: Regular rate  Lungs: Clear to auscultation bilaterally  Abdomen: Soft, nontender, nondistended, no peritoneal signs  Extremities: No edema, normal appearance  Neuro: GCS 15  Psych: Mood and affect are appropriate    Procedures           ED Course  ED Course as of May 23 1900   Wed May 23, 2018   1833 Dr. Robertson, on call for Dr. Bass, agreed to set up follow up with this pt tomorrow  [DT]      ED Course User Index  [DT] Bigg Campos MD                   MDM  Number of Diagnoses or Management Options  Unstable angina:   Diagnosis management comments: Patient's story is suggestive for unstable angina.  His EKG does not show evidence significant for acute ischemia.  His troponin was repeated at 2 hours to rule out acute MI.  It was in the gray zone both times.  Talking with the patient and looking at the records they have been trying to get him an appointment with his cardiologist recently.  I did contact Dr. Robertson who is on call for Dr. Bass did take down the patient's information and agreed to set him up for appointment tomorrow.  I did discuss I feel like the patient would need a heart catheter in the near future.  Discussed admission versus follow with the patient.  This time he would like to go home but did agree if he was symptomatic again and required nitroglycerin to return to the emergency department.       Amount and/or Complexity of Data Reviewed  Clinical lab tests: reviewed  Tests in the radiology section of CPT®: reviewed  Decide to obtain previous medical records or to obtain history from someone other than the patient: yes          Final diagnoses:   Unstable angina            Bigg Campos MD  05/23/18 7503

## 2018-05-23 NOTE — TELEPHONE ENCOUNTER
Patient called back today and stated he is feeling fine today but saw Dr Rojas yesterday and he was having some chest pain and increased edema. Patient denies chest pain since that one episode. Dr Rojas did blood work and started patient on lasix and will f/u in a week. Advised patient lets see if his edema improves and to call back if he has another episode of chest pain otherwise f/u with Dr Rojas next week to determine if need earlier appt with Dr Bass.

## 2018-05-23 NOTE — TELEPHONE ENCOUNTER
Patient called back and stated that while at therapy he started having chest pain and vomiting and they advised him to go to ER and he wanted to make sure that was what he should do. Advised patient he should follow their recommendations and go on to ER for eval.

## 2018-05-24 ENCOUNTER — TELEPHONE (OUTPATIENT)
Dept: CARDIOLOGY | Facility: CLINIC | Age: 68
End: 2018-05-24

## 2018-05-24 ENCOUNTER — HOSPITAL ENCOUNTER (OUTPATIENT)
Facility: HOSPITAL | Age: 68
Discharge: HOME | End: 2018-05-25
Attending: INTERNAL MEDICINE | Admitting: INTERNAL MEDICINE

## 2018-05-24 ENCOUNTER — OFFICE VISIT (OUTPATIENT)
Dept: CARDIOLOGY | Facility: CLINIC | Age: 68
End: 2018-05-24

## 2018-05-24 VITALS
BODY MASS INDEX: 23.95 KG/M2 | WEIGHT: 158 LBS | SYSTOLIC BLOOD PRESSURE: 122 MMHG | HEART RATE: 89 BPM | DIASTOLIC BLOOD PRESSURE: 80 MMHG | HEIGHT: 68 IN

## 2018-05-24 DIAGNOSIS — I25.110 ATHEROSCLEROSIS OF NATIVE CORONARY ARTERY OF NATIVE HEART WITH UNSTABLE ANGINA PECTORIS (HCC): ICD-10-CM

## 2018-05-24 DIAGNOSIS — N18.30 CHRONIC KIDNEY DISEASE, STAGE III (MODERATE) (HCC): Primary | ICD-10-CM

## 2018-05-24 DIAGNOSIS — I25.110 ATHEROSCLEROSIS OF NATIVE CORONARY ARTERY OF NATIVE HEART WITH UNSTABLE ANGINA PECTORIS (HCC): Primary | ICD-10-CM

## 2018-05-24 DIAGNOSIS — E78.2 MIXED HYPERLIPIDEMIA: ICD-10-CM

## 2018-05-24 DIAGNOSIS — I10 ESSENTIAL HYPERTENSION: ICD-10-CM

## 2018-05-24 LAB
ANION GAP SERPL CALCULATED.3IONS-SCNC: 10 MMOL/L (ref 3–11)
ARTICHOKE IGE QN: 87 MG/DL (ref 0–130)
BUN BLD-MCNC: 42 MG/DL (ref 9–23)
BUN/CREAT SERPL: 30 (ref 7–25)
CALCIUM SPEC-SCNC: 9.9 MG/DL (ref 8.7–10.4)
CHLORIDE SERPL-SCNC: 99 MMOL/L (ref 99–109)
CHOLEST SERPL-MCNC: 156 MG/DL (ref 0–200)
CO2 SERPL-SCNC: 34 MMOL/L (ref 20–31)
CREAT BLD-MCNC: 1.4 MG/DL (ref 0.6–1.3)
DEPRECATED RDW RBC AUTO: 45 FL (ref 37–54)
ERYTHROCYTE [DISTWIDTH] IN BLOOD BY AUTOMATED COUNT: 13.3 % (ref 11.3–14.5)
GFR SERPL CREATININE-BSD FRML MDRD: 50 ML/MIN/1.73
GLUCOSE BLD-MCNC: 79 MG/DL (ref 70–100)
GLUCOSE BLDC GLUCOMTR-MCNC: 118 MG/DL (ref 70–130)
HCT VFR BLD AUTO: 40.3 % (ref 38.9–50.9)
HDLC SERPL-MCNC: 52 MG/DL (ref 40–60)
HGB BLD-MCNC: 12.7 G/DL (ref 13.1–17.5)
MCH RBC QN AUTO: 29.5 PG (ref 27–31)
MCHC RBC AUTO-ENTMCNC: 31.5 G/DL (ref 32–36)
MCV RBC AUTO: 93.7 FL (ref 80–99)
PLATELET # BLD AUTO: 226 10*3/MM3 (ref 150–450)
PMV BLD AUTO: 9.1 FL (ref 6–12)
POTASSIUM BLD-SCNC: 3.7 MMOL/L (ref 3.5–5.5)
RBC # BLD AUTO: 4.3 10*6/MM3 (ref 4.2–5.76)
SODIUM BLD-SCNC: 143 MMOL/L (ref 132–146)
TRIGL SERPL-MCNC: 104 MG/DL (ref 0–150)
TROPONIN I SERPL-MCNC: 0.07 NG/ML
WBC NRBC COR # BLD: 10.45 10*3/MM3 (ref 3.5–10.8)

## 2018-05-24 PROCEDURE — 63710000001 TAMSULOSIN 0.4 MG CAPSULE: Performed by: INTERNAL MEDICINE

## 2018-05-24 PROCEDURE — 82962 GLUCOSE BLOOD TEST: CPT

## 2018-05-24 PROCEDURE — 94799 UNLISTED PULMONARY SVC/PX: CPT

## 2018-05-24 PROCEDURE — 63710000001 BACLOFEN 10 MG TABLET: Performed by: INTERNAL MEDICINE

## 2018-05-24 PROCEDURE — 25010000002 HEPARIN (PORCINE) PER 1000 UNITS: Performed by: INTERNAL MEDICINE

## 2018-05-24 PROCEDURE — 63710000001 ALPRAZOLAM 0.5 MG TABLET: Performed by: INTERNAL MEDICINE

## 2018-05-24 PROCEDURE — A9270 NON-COVERED ITEM OR SERVICE: HCPCS | Performed by: INTERNAL MEDICINE

## 2018-05-24 PROCEDURE — 0 IOPAMIDOL PER 1 ML: Performed by: INTERNAL MEDICINE

## 2018-05-24 PROCEDURE — C1769 GUIDE WIRE: HCPCS | Performed by: INTERNAL MEDICINE

## 2018-05-24 PROCEDURE — 93458 L HRT ARTERY/VENTRICLE ANGIO: CPT | Performed by: INTERNAL MEDICINE

## 2018-05-24 PROCEDURE — 80048 BASIC METABOLIC PNL TOTAL CA: CPT | Performed by: INTERNAL MEDICINE

## 2018-05-24 PROCEDURE — G0378 HOSPITAL OBSERVATION PER HR: HCPCS

## 2018-05-24 PROCEDURE — 80061 LIPID PANEL: CPT | Performed by: INTERNAL MEDICINE

## 2018-05-24 PROCEDURE — 25010000002 MIDAZOLAM PER 1 MG: Performed by: INTERNAL MEDICINE

## 2018-05-24 PROCEDURE — 94640 AIRWAY INHALATION TREATMENT: CPT

## 2018-05-24 PROCEDURE — 63710000001 METOPROLOL TARTRATE 25 MG TABLET: Performed by: INTERNAL MEDICINE

## 2018-05-24 PROCEDURE — 99214 OFFICE O/P EST MOD 30 MIN: CPT | Performed by: INTERNAL MEDICINE

## 2018-05-24 PROCEDURE — 84484 ASSAY OF TROPONIN QUANT: CPT | Performed by: NURSE PRACTITIONER

## 2018-05-24 PROCEDURE — 85027 COMPLETE CBC AUTOMATED: CPT | Performed by: INTERNAL MEDICINE

## 2018-05-24 PROCEDURE — 63710000001 BUDESONIDE-FORMOTEROL 80-4.5 MCG/ACT AEROSOL 6.9 G INHALER: Performed by: INTERNAL MEDICINE

## 2018-05-24 PROCEDURE — 25010000002 FENTANYL CITRATE (PF) 100 MCG/2ML SOLUTION: Performed by: INTERNAL MEDICINE

## 2018-05-24 PROCEDURE — 63710000001 OXYBUTYNIN 5 MG TABLET: Performed by: INTERNAL MEDICINE

## 2018-05-24 PROCEDURE — 36415 COLL VENOUS BLD VENIPUNCTURE: CPT

## 2018-05-24 PROCEDURE — 63710000001 ATORVASTATIN 40 MG TABLET: Performed by: INTERNAL MEDICINE

## 2018-05-24 PROCEDURE — C1894 INTRO/SHEATH, NON-LASER: HCPCS | Performed by: INTERNAL MEDICINE

## 2018-05-24 RX ORDER — ATORVASTATIN CALCIUM 40 MG/1
40 TABLET, FILM COATED ORAL NIGHTLY
Status: DISCONTINUED | OUTPATIENT
Start: 2018-05-24 | End: 2018-05-25 | Stop reason: HOSPADM

## 2018-05-24 RX ORDER — ASPIRIN 81 MG/1
324 TABLET, CHEWABLE ORAL ONCE
Status: COMPLETED | OUTPATIENT
Start: 2018-05-24 | End: 2018-05-24

## 2018-05-24 RX ORDER — TAMSULOSIN HYDROCHLORIDE 0.4 MG/1
0.4 CAPSULE ORAL NIGHTLY
Status: DISCONTINUED | OUTPATIENT
Start: 2018-05-24 | End: 2018-05-25 | Stop reason: HOSPADM

## 2018-05-24 RX ORDER — SPIRONOLACTONE 25 MG/1
25 TABLET ORAL DAILY
Status: DISCONTINUED | OUTPATIENT
Start: 2018-05-25 | End: 2018-05-25 | Stop reason: HOSPADM

## 2018-05-24 RX ORDER — IPRATROPIUM BROMIDE AND ALBUTEROL SULFATE 2.5; .5 MG/3ML; MG/3ML
3 SOLUTION RESPIRATORY (INHALATION)
Status: DISCONTINUED | OUTPATIENT
Start: 2018-05-24 | End: 2018-05-25 | Stop reason: HOSPADM

## 2018-05-24 RX ORDER — BACLOFEN 10 MG/1
10 TABLET ORAL EVERY 8 HOURS SCHEDULED
Status: DISCONTINUED | OUTPATIENT
Start: 2018-05-24 | End: 2018-05-25 | Stop reason: HOSPADM

## 2018-05-24 RX ORDER — SODIUM CHLORIDE 0.9 % (FLUSH) 0.9 %
1-10 SYRINGE (ML) INJECTION AS NEEDED
Status: DISCONTINUED | OUTPATIENT
Start: 2018-05-24 | End: 2018-05-25 | Stop reason: HOSPADM

## 2018-05-24 RX ORDER — LIDOCAINE HYDROCHLORIDE 10 MG/ML
INJECTION, SOLUTION EPIDURAL; INFILTRATION; INTRACAUDAL; PERINEURAL AS NEEDED
Status: DISCONTINUED | OUTPATIENT
Start: 2018-05-24 | End: 2018-05-24 | Stop reason: HOSPADM

## 2018-05-24 RX ORDER — BUDESONIDE AND FORMOTEROL FUMARATE DIHYDRATE 80; 4.5 UG/1; UG/1
2 AEROSOL RESPIRATORY (INHALATION)
Status: DISCONTINUED | OUTPATIENT
Start: 2018-05-24 | End: 2018-05-25 | Stop reason: HOSPADM

## 2018-05-24 RX ORDER — BUDESONIDE 0.5 MG/2ML
0.5 INHALANT ORAL
Status: DISCONTINUED | OUTPATIENT
Start: 2018-05-24 | End: 2018-05-24

## 2018-05-24 RX ORDER — ONDANSETRON 2 MG/ML
4 INJECTION INTRAMUSCULAR; INTRAVENOUS EVERY 6 HOURS PRN
Status: DISCONTINUED | OUTPATIENT
Start: 2018-05-24 | End: 2018-05-25 | Stop reason: HOSPADM

## 2018-05-24 RX ORDER — FENTANYL CITRATE 50 UG/ML
INJECTION, SOLUTION INTRAMUSCULAR; INTRAVENOUS AS NEEDED
Status: DISCONTINUED | OUTPATIENT
Start: 2018-05-24 | End: 2018-05-24 | Stop reason: HOSPADM

## 2018-05-24 RX ORDER — OXYBUTYNIN CHLORIDE 5 MG/1
5 TABLET ORAL NIGHTLY
Status: DISCONTINUED | OUTPATIENT
Start: 2018-05-24 | End: 2018-05-25 | Stop reason: HOSPADM

## 2018-05-24 RX ORDER — PREDNISONE 10 MG/1
10 TABLET ORAL DAILY
Status: DISCONTINUED | OUTPATIENT
Start: 2018-05-25 | End: 2018-05-25 | Stop reason: HOSPADM

## 2018-05-24 RX ORDER — ALBUTEROL SULFATE 2.5 MG/3ML
SOLUTION RESPIRATORY (INHALATION)
Status: COMPLETED
Start: 2018-05-24 | End: 2018-05-24

## 2018-05-24 RX ORDER — FUROSEMIDE 20 MG/1
20 TABLET ORAL DAILY
Status: DISCONTINUED | OUTPATIENT
Start: 2018-05-25 | End: 2018-05-25 | Stop reason: HOSPADM

## 2018-05-24 RX ORDER — SODIUM CHLORIDE 9 MG/ML
1 INJECTION, SOLUTION INTRAVENOUS CONTINUOUS
Status: ACTIVE | OUTPATIENT
Start: 2018-05-24 | End: 2018-05-24

## 2018-05-24 RX ORDER — LOSARTAN POTASSIUM 50 MG/1
100 TABLET ORAL DAILY
Status: DISCONTINUED | OUTPATIENT
Start: 2018-05-25 | End: 2018-05-25

## 2018-05-24 RX ORDER — CLOPIDOGREL BISULFATE 75 MG/1
75 TABLET ORAL DAILY
Status: DISCONTINUED | OUTPATIENT
Start: 2018-05-25 | End: 2018-05-25 | Stop reason: HOSPADM

## 2018-05-24 RX ORDER — ACETAMINOPHEN 325 MG/1
650 TABLET ORAL EVERY 4 HOURS PRN
Status: DISCONTINUED | OUTPATIENT
Start: 2018-05-24 | End: 2018-05-25 | Stop reason: HOSPADM

## 2018-05-24 RX ORDER — MIDAZOLAM HYDROCHLORIDE 1 MG/ML
INJECTION INTRAMUSCULAR; INTRAVENOUS AS NEEDED
Status: DISCONTINUED | OUTPATIENT
Start: 2018-05-24 | End: 2018-05-24 | Stop reason: HOSPADM

## 2018-05-24 RX ORDER — ASPIRIN 81 MG/1
81 TABLET ORAL DAILY
Status: DISCONTINUED | OUTPATIENT
Start: 2018-05-25 | End: 2018-05-25 | Stop reason: HOSPADM

## 2018-05-24 RX ORDER — ALPRAZOLAM 0.5 MG/1
0.5 TABLET ORAL NIGHTLY PRN
Status: DISCONTINUED | OUTPATIENT
Start: 2018-05-24 | End: 2018-05-25 | Stop reason: HOSPADM

## 2018-05-24 RX ORDER — NITROGLYCERIN 0.4 MG/1
0.4 TABLET SUBLINGUAL
Status: DISCONTINUED | OUTPATIENT
Start: 2018-05-24 | End: 2018-05-25 | Stop reason: HOSPADM

## 2018-05-24 RX ORDER — FINASTERIDE 5 MG/1
5 TABLET, FILM COATED ORAL DAILY
Status: DISCONTINUED | OUTPATIENT
Start: 2018-05-25 | End: 2018-05-25 | Stop reason: HOSPADM

## 2018-05-24 RX ADMIN — ATORVASTATIN CALCIUM 40 MG: 40 TABLET, FILM COATED ORAL at 21:39

## 2018-05-24 RX ADMIN — ASPIRIN 81 MG 324 MG: 81 TABLET ORAL at 14:10

## 2018-05-24 RX ADMIN — OXYBUTYNIN CHLORIDE 5 MG: 5 TABLET ORAL at 21:39

## 2018-05-24 RX ADMIN — METOPROLOL TARTRATE 25 MG: 25 TABLET ORAL at 21:38

## 2018-05-24 RX ADMIN — BUDESONIDE AND FORMOTEROL FUMARATE DIHYDRATE 2 PUFF: 80; 4.5 AEROSOL RESPIRATORY (INHALATION) at 20:24

## 2018-05-24 RX ADMIN — ALPRAZOLAM 0.5 MG: 0.5 TABLET ORAL at 23:11

## 2018-05-24 RX ADMIN — SODIUM CHLORIDE 1 ML/KG/HR: 9 INJECTION, SOLUTION INTRAVENOUS at 14:10

## 2018-05-24 RX ADMIN — IPRATROPIUM BROMIDE AND ALBUTEROL SULFATE 3 ML: 2.5; .5 SOLUTION RESPIRATORY (INHALATION) at 20:24

## 2018-05-24 RX ADMIN — ALBUTEROL SULFATE 2.5 MG: 2.5 SOLUTION RESPIRATORY (INHALATION) at 14:15

## 2018-05-24 RX ADMIN — TAMSULOSIN HYDROCHLORIDE 0.4 MG: 0.4 CAPSULE ORAL at 21:39

## 2018-05-24 RX ADMIN — BACLOFEN 10 MG: 10 TABLET ORAL at 21:39

## 2018-05-24 NOTE — H&P (VIEW-ONLY)
Thurman Mendel Parsons  1950  [phone]      VISIT DATE: 5/24/2018     PCP: Miguel Rojas MD  [address] [facility] [address]  [facility] [address]    IDENTIFICATION: A 68 y.o. male retired from [place]     1. CAD  1. 1994, 2008- [facility] nonobstr  2. 2011 SE wnl  2. Abnl Echo  1. 2013 EF 45 w BBB  2. 1/3/18 echo: EF 45-50%, moderate LA enlargement, mitral thickening with mild MR, sclerosed aortic valve with mild AI, mild TR, apical segmental hypokinesis  3. CVD  1. B CEA -Huber remote  2. CUS [facility] 2014 nonobst  4. HTN  5. HL  1. 6/14 137/149/42/65  6. CKD  7. PEDRO  8. COPD  9. PFO closure Dr Alves    Allergies  Allergies   Allergen Reactions   • Milk-Related Compounds    • Mucomyst [Acetylcysteine] Other (See Comments)     bronchospasms   • Sulfa Antibiotics Other (See Comments), Nausea Only and Nausea And Vomiting       Current Medications    Current Outpatient Prescriptions:   •  alfuzosin (UROXATRAL) 10 MG 24 hr tablet, Take 1 tablet by mouth Daily., Disp: 30 tablet, Rfl: 0  •  ALPRAZolam (XANAX) 0.5 MG tablet, TAKE ONE TABLET BY MOUTH EVERY NIGHT AT BEDTIME AS NEEDED FOR ANXIETY, Disp: 30 tablet, Rfl: 3  •  atorvastatin (LIPITOR) 40 MG tablet, TAKE ONE TABLET BY MOUTH EVERY NIGHT AT BEDTIME, Disp: 30 tablet, Rfl: 5  •  baclofen (LIORESAL) 10 MG tablet, Take 1 tablet by mouth 3 (Three) Times a Day., Disp: 30 tablet, Rfl: 1  •  BREO ELLIPTA 200-25 MCG/INH aerosol powder , Inhale 1 each Daily., Disp: 60 each, Rfl: 5  •  budesonide (PULMICORT) 0.5 MG/2ML nebulizer solution, Take 2 mL by nebulization 2 (Two) Times a Day., Disp: , Rfl:   •  calcium carbonate (OS-HAFSA) 600 MG tablet, Take 600 mg by mouth 2 (Two) Times a Day With Meals., Disp: , Rfl:   •  carvedilol (COREG) 3.125 MG tablet, TAKE ONE TABLET BY MOUTH TWICE A DAY, Disp: 60 tablet, Rfl: 3  •  clopidogrel (PLAVIX) 75 MG tablet, TAKE ONE TABLET BY MOUTH DAILY, Disp: 30 tablet, Rfl: 5  •  escitalopram (LEXAPRO) 10 MG tablet, Take 1 tablet by mouth Daily., Disp: 90  tablet, Rfl: 3  •  finasteride (PROSCAR) 5 MG tablet, Take 5 mg by mouth Daily., Disp: , Rfl:   •  fluticasone (FLONASE) 50 MCG/ACT nasal spray, 1 spray into each nostril Daily., Disp: 16 g, Rfl: 5  •  furosemide (LASIX) 20 MG tablet, Take 1 tablet by mouth Daily., Disp: 30 tablet, Rfl: 1  •  ipratropium-albuterol (DUO-NEB) 0.5-2.5 mg/mL nebulizer, Take 3 mL by nebulization Every 6 (Six) Hours., Disp: 360 mL, Rfl:   •  iron polysaccharides (NIFEREX) 150 MG capsule, Take 150 mg by mouth 2 (Two) Times a Day., Disp: , Rfl:   •  losartan (COZAAR) 100 MG tablet, Take 1 tablet by mouth Daily., Disp: 30 tablet, Rfl: 3  •  nitroglycerin (NITROSTAT) 0.4 MG SL tablet, Place  under the tongue., Disp: , Rfl:   •  O2 (OXYGEN), Inhale 4 L/min 1 (One) Time. Walking on 4mg and sitting 3mg, Disp: , Rfl:   •  oxybutynin (DITROPAN) 5 MG tablet, TAKE ONE TABLET BY MOUTH EVERY NIGHT AT BEDTIME, Disp: 30 tablet, Rfl: 2  •  predniSONE (DELTASONE) 10 MG tablet, TAKE ONE TABLET BY MOUTH DAILY, Disp: 30 tablet, Rfl: 2  •  PROAIR  (90 BASE) MCG/ACT inhaler, Inhale 2 puffs Every 4 (Four) Hours As Needed for Wheezing or Shortness of Air., Disp: , Rfl:   •  spironolactone (ALDACTONE) 25 MG tablet, Take 1 tablet by mouth Daily., Disp: 30 tablet, Rfl: 0  No current facility-administered medications for this visit.     History of Present Illness     Pt presents for early follow up after an ED visit yesterday 5/23/18 when he developed CP on the exercise bike at pulmonary rehab.  This was described as chest pressure and was moderate to severe.  The pain resolved with rest.  It was associated with diaphoresis, nausea, and vomiting.  He also had an episode of chest pain several days ago for which she took one nitroglycerin that relieved his pain.  In the ED he had 2 grey zone troponins and no ischemic EKG changes.  It was decided for him to be evaluated in clinic today. He was instructed that if his symptoms returned he needed to go to the ED.  "    ROS:  All systems have been reviewed and are negative with the exception of those mentioned in the HPI.    OBJECTIVE:    Vitals:    05/24/18 0959   BP: 122/80   BP Location: Right arm   Patient Position: Sitting   Pulse: 89   Weight: 71.7 kg (158 lb)   Height: 172.7 cm (68\")       Physical Exam   Constitutional: He appears well-developed and well-nourished.   Neck: Normal range of motion. Neck supple. No hepatojugular reflux and no JVD present. Carotid bruit is not present. No tracheal deviation present. No thyromegaly present.   Cardiovascular: Normal rate, regular rhythm, S1 normal, S2 normal, intact distal pulses and normal pulses.  PMI is not displaced.  Exam reveals no gallop, no distant heart sounds, no friction rub, no midsystolic click and no opening snap.    Murmur heard.  Pulses:       Radial pulses are 2+ on the right side, and 2+ on the left side.        Dorsalis pedis pulses are 2+ on the right side, and 2+ on the left side.        Posterior tibial pulses are 2+ on the right side, and 2+ on the left side.   Pulmonary/Chest: He is in respiratory distress. He has wheezes. He has no rales.   Abdominal: Soft. Bowel sounds are normal. He exhibits no mass. There is no tenderness. There is no guarding.       Diagnostic Data:  Procedures      ASSESSMENT:   Diagnosis Plan   1. Atherosclerosis of native coronary artery of native heart with unstable angina pectoris  Case Request Cath Lab: Left Heart Cath    Lipid Panel    CBC (No Diff)    Basic Metabolic Panel   2. Essential hypertension     3. Mixed hyperlipidemia         PLAN:  CAD with recent episodes of unstable angina. After discussion with patient on risks and benefits, we will schedule LHC +/- CBI for tomorrow in [place].    COPD with chronic hypoxemia on high flow nasal cannula at baseline.  No apparent right heart failure at current clinically.    LV dysfunction mild with stage III CHF at current no necessity for diuresis.  Echo in January unchanged " from previous.    BP acceptable. Continue statin therapy.    Dyslipidemia on statin therapy    Follow-up 6-9 months sooner if needed    Miguel Rojas MD, thank you for referring Mr. Pena for evaluation.  I have forwarded my electronically generated recommendations to you for review.  Please do not hesitate to call with any questions.    Scribed for Ulysses Bass MD by Shawanda Swain PA-C. 5/24/2018  10:17 AM   I, Ulysses Bass MD, personally performed the services described in this documentation as scribed by the above named individual in my presence, and it is both accurate and complete.  5/24/2018  12:10 PM    Ulysses Bass MD, FACC

## 2018-05-24 NOTE — PROGRESS NOTES
Thurman Mendel Parsons  1950  [phone]      VISIT DATE: 5/24/2018     PCP: Miguel Rojas MD  [address]  [facility] [address]    IDENTIFICATION: A 68 y.o. male retired from [place]     1. CAD  1. 1994, 2008- [facility] nonobstr  2. 2011 SE wnl  2. Abnl Echo  1. 2013 EF 45 w BBB  2. 1/3/18 echo: EF 45-50%, moderate LA enlargement, mitral thickening with mild MR, sclerosed aortic valve with mild AI, mild TR, apical segmental hypokinesis  3. CVD  1. B CEA -Huber remote  2. CUS [facility] 2014 nonobst  4. HTN  5. HL  1. 6/14 137/149/42/65  6. CKD  7. PEDRO  8. COPD  9. PFO closure Dr Alves    Allergies  Allergies   Allergen Reactions   • Milk-Related Compounds    • Mucomyst [Acetylcysteine] Other (See Comments)     bronchospasms   • Sulfa Antibiotics Other (See Comments), Nausea Only and Nausea And Vomiting       Current Medications    Current Outpatient Prescriptions:   •  alfuzosin (UROXATRAL) 10 MG 24 hr tablet, Take 1 tablet by mouth Daily., Disp: 30 tablet, Rfl: 0  •  ALPRAZolam (XANAX) 0.5 MG tablet, TAKE ONE TABLET BY MOUTH EVERY NIGHT AT BEDTIME AS NEEDED FOR ANXIETY, Disp: 30 tablet, Rfl: 3  •  atorvastatin (LIPITOR) 40 MG tablet, TAKE ONE TABLET BY MOUTH EVERY NIGHT AT BEDTIME, Disp: 30 tablet, Rfl: 5  •  baclofen (LIORESAL) 10 MG tablet, Take 1 tablet by mouth 3 (Three) Times a Day., Disp: 30 tablet, Rfl: 1  •  BREO ELLIPTA 200-25 MCG/INH aerosol powder , Inhale 1 each Daily., Disp: 60 each, Rfl: 5  •  budesonide (PULMICORT) 0.5 MG/2ML nebulizer solution, Take 2 mL by nebulization 2 (Two) Times a Day., Disp: , Rfl:   •  calcium carbonate (OS-HAFSA) 600 MG tablet, Take 600 mg by mouth 2 (Two) Times a Day With Meals., Disp: , Rfl:   •  carvedilol (COREG) 3.125 MG tablet, TAKE ONE TABLET BY MOUTH TWICE A DAY, Disp: 60 tablet, Rfl: 3  •  clopidogrel (PLAVIX) 75 MG tablet, TAKE ONE TABLET BY MOUTH DAILY, Disp: 30 tablet, Rfl: 5  •  escitalopram (LEXAPRO) 10 MG tablet, Take 1 tablet by mouth Daily., Disp: 90  tablet, Rfl: 3  •  finasteride (PROSCAR) 5 MG tablet, Take 5 mg by mouth Daily., Disp: , Rfl:   •  fluticasone (FLONASE) 50 MCG/ACT nasal spray, 1 spray into each nostril Daily., Disp: 16 g, Rfl: 5  •  furosemide (LASIX) 20 MG tablet, Take 1 tablet by mouth Daily., Disp: 30 tablet, Rfl: 1  •  ipratropium-albuterol (DUO-NEB) 0.5-2.5 mg/mL nebulizer, Take 3 mL by nebulization Every 6 (Six) Hours., Disp: 360 mL, Rfl:   •  iron polysaccharides (NIFEREX) 150 MG capsule, Take 150 mg by mouth 2 (Two) Times a Day., Disp: , Rfl:   •  losartan (COZAAR) 100 MG tablet, Take 1 tablet by mouth Daily., Disp: 30 tablet, Rfl: 3  •  nitroglycerin (NITROSTAT) 0.4 MG SL tablet, Place  under the tongue., Disp: , Rfl:   •  O2 (OXYGEN), Inhale 4 L/min 1 (One) Time. Walking on 4mg and sitting 3mg, Disp: , Rfl:   •  oxybutynin (DITROPAN) 5 MG tablet, TAKE ONE TABLET BY MOUTH EVERY NIGHT AT BEDTIME, Disp: 30 tablet, Rfl: 2  •  predniSONE (DELTASONE) 10 MG tablet, TAKE ONE TABLET BY MOUTH DAILY, Disp: 30 tablet, Rfl: 2  •  PROAIR  (90 BASE) MCG/ACT inhaler, Inhale 2 puffs Every 4 (Four) Hours As Needed for Wheezing or Shortness of Air., Disp: , Rfl:   •  spironolactone (ALDACTONE) 25 MG tablet, Take 1 tablet by mouth Daily., Disp: 30 tablet, Rfl: 0  No current facility-administered medications for this visit.     History of Present Illness     Pt presents for early follow up after an ED visit yesterday 5/23/18 when he developed CP on the exercise bike at pulmonary rehab.  This was described as chest pressure and was moderate to severe.  The pain resolved with rest.  It was associated with diaphoresis, nausea, and vomiting.  He also had an episode of chest pain several days ago for which she took one nitroglycerin that relieved his pain.  In the ED he had 2 grey zone troponins and no ischemic EKG changes.  It was decided for him to be evaluated in clinic today. He was instructed that if his symptoms returned he needed to go to the ED.  "    ROS:  All systems have been reviewed and are negative with the exception of those mentioned in the HPI.    OBJECTIVE:    Vitals:    05/24/18 0959   BP: 122/80   BP Location: Right arm   Patient Position: Sitting   Pulse: 89   Weight: 71.7 kg (158 lb)   Height: 172.7 cm (68\")       Physical Exam   Constitutional: He appears well-developed and well-nourished.   Neck: Normal range of motion. Neck supple. No hepatojugular reflux and no JVD present. Carotid bruit is not present. No tracheal deviation present. No thyromegaly present.   Cardiovascular: Normal rate, regular rhythm, S1 normal, S2 normal, intact distal pulses and normal pulses.  PMI is not displaced.  Exam reveals no gallop, no distant heart sounds, no friction rub, no midsystolic click and no opening snap.    Murmur heard.  Pulses:       Radial pulses are 2+ on the right side, and 2+ on the left side.        Dorsalis pedis pulses are 2+ on the right side, and 2+ on the left side.        Posterior tibial pulses are 2+ on the right side, and 2+ on the left side.   Pulmonary/Chest: He is in respiratory distress. He has wheezes. He has no rales.   Abdominal: Soft. Bowel sounds are normal. He exhibits no mass. There is no tenderness. There is no guarding.       Diagnostic Data:  Procedures      ASSESSMENT:   Diagnosis Plan   1. Atherosclerosis of native coronary artery of native heart with unstable angina pectoris  Case Request Cath Lab: Left Heart Cath    Lipid Panel    CBC (No Diff)    Basic Metabolic Panel   2. Essential hypertension     3. Mixed hyperlipidemia         PLAN:  CAD with recent episodes of unstable angina. After discussion with patient on risks and benefits, we will schedule LHC +/- CBI for tomorrow in [place].    COPD with chronic hypoxemia on high flow nasal cannula at baseline.  No apparent right heart failure at current clinically.    LV dysfunction mild with stage III CHF at current no necessity for diuresis.  Echo in January unchanged " from previous.    BP acceptable. Continue statin therapy.    Dyslipidemia on statin therapy    Follow-up 6-9 months sooner if needed    Miguel Rojas MD, thank you for referring Mr. Pena for evaluation.  I have forwarded my electronically generated recommendations to you for review.  Please do not hesitate to call with any questions.    Scribed for Ulysses Bass MD by Shawanda Swain PA-C. 5/24/2018  10:17 AM   I, Ulysses Bass MD, personally performed the services described in this documentation as scribed by the above named individual in my presence, and it is both accurate and complete.  5/24/2018  12:10 PM    Ulysses Bass MD, FACC

## 2018-05-24 NOTE — INTERVAL H&P NOTE
H&P reviewed. The patient was examined and there are no changes to the H&P.    Right Mynro's acceptable.  [facility] +/- CBI with Dr. Robertson today. The risks, benefits, and alternatives of the procedure have been reviewed and the patient wishes to proceed. Cr 1.3 5/23/18, will begin fluid protocol. Will order home Pulmicort prior to procedure.    Emerita Alcala, APRN   05/24/18

## 2018-05-25 VITALS
DIASTOLIC BLOOD PRESSURE: 66 MMHG | TEMPERATURE: 98 F | BODY MASS INDEX: 23.66 KG/M2 | HEIGHT: 68 IN | WEIGHT: 156.09 LBS | OXYGEN SATURATION: 96 % | HEART RATE: 67 BPM | RESPIRATION RATE: 20 BRPM | SYSTOLIC BLOOD PRESSURE: 107 MMHG

## 2018-05-25 LAB
ANION GAP SERPL CALCULATED.3IONS-SCNC: 6 MMOL/L (ref 3–11)
BUN BLD-MCNC: 40 MG/DL (ref 9–23)
BUN/CREAT SERPL: 25 (ref 7–25)
CALCIUM SPEC-SCNC: 8.9 MG/DL (ref 8.7–10.4)
CHLORIDE SERPL-SCNC: 102 MMOL/L (ref 99–109)
CO2 SERPL-SCNC: 36 MMOL/L (ref 20–31)
CREAT BLD-MCNC: 1.6 MG/DL (ref 0.6–1.3)
GFR SERPL CREATININE-BSD FRML MDRD: 43 ML/MIN/1.73
GLUCOSE BLD-MCNC: 93 MG/DL (ref 70–100)
GLUCOSE BLDC GLUCOMTR-MCNC: 90 MG/DL (ref 70–130)
POTASSIUM BLD-SCNC: 3.8 MMOL/L (ref 3.5–5.5)
SODIUM BLD-SCNC: 144 MMOL/L (ref 132–146)

## 2018-05-25 PROCEDURE — A9270 NON-COVERED ITEM OR SERVICE: HCPCS | Performed by: INTERNAL MEDICINE

## 2018-05-25 PROCEDURE — 80048 BASIC METABOLIC PNL TOTAL CA: CPT | Performed by: INTERNAL MEDICINE

## 2018-05-25 PROCEDURE — G0378 HOSPITAL OBSERVATION PER HR: HCPCS

## 2018-05-25 PROCEDURE — 63710000001 CLOPIDOGREL 75 MG TABLET: Performed by: INTERNAL MEDICINE

## 2018-05-25 PROCEDURE — 63710000001 SPIRONOLACTONE 25 MG TABLET: Performed by: INTERNAL MEDICINE

## 2018-05-25 PROCEDURE — 63710000001 PREDNISONE PER 5 MG: Performed by: INTERNAL MEDICINE

## 2018-05-25 PROCEDURE — 94640 AIRWAY INHALATION TREATMENT: CPT

## 2018-05-25 PROCEDURE — 63710000001 BACLOFEN 10 MG TABLET: Performed by: INTERNAL MEDICINE

## 2018-05-25 PROCEDURE — 63710000001 METOPROLOL TARTRATE 25 MG TABLET: Performed by: INTERNAL MEDICINE

## 2018-05-25 PROCEDURE — 63710000001 ASPIRIN 81 MG TABLET DELAYED-RELEASE: Performed by: NURSE PRACTITIONER

## 2018-05-25 PROCEDURE — 99217 PR OBSERVATION CARE DISCHARGE MANAGEMENT: CPT | Performed by: INTERNAL MEDICINE

## 2018-05-25 PROCEDURE — 94760 N-INVAS EAR/PLS OXIMETRY 1: CPT

## 2018-05-25 PROCEDURE — 82962 GLUCOSE BLOOD TEST: CPT

## 2018-05-25 PROCEDURE — 63710000001 FUROSEMIDE 20 MG TABLET: Performed by: INTERNAL MEDICINE

## 2018-05-25 PROCEDURE — 63710000001 FINASTERIDE 5 MG TABLET: Performed by: INTERNAL MEDICINE

## 2018-05-25 PROCEDURE — 94799 UNLISTED PULMONARY SVC/PX: CPT

## 2018-05-25 PROCEDURE — A9270 NON-COVERED ITEM OR SERVICE: HCPCS | Performed by: NURSE PRACTITIONER

## 2018-05-25 RX ORDER — ASPIRIN 81 MG/1
81 TABLET ORAL DAILY
Qty: 90 TABLET | Refills: 3 | Status: SHIPPED | OUTPATIENT
Start: 2018-05-26 | End: 2019-11-04 | Stop reason: SDUPTHER

## 2018-05-25 RX ADMIN — CLOPIDOGREL BISULFATE 75 MG: 75 TABLET ORAL at 08:02

## 2018-05-25 RX ADMIN — BUDESONIDE AND FORMOTEROL FUMARATE DIHYDRATE 2 PUFF: 80; 4.5 AEROSOL RESPIRATORY (INHALATION) at 08:30

## 2018-05-25 RX ADMIN — BACLOFEN 10 MG: 10 TABLET ORAL at 05:44

## 2018-05-25 RX ADMIN — PREDNISONE 10 MG: 10 TABLET ORAL at 08:02

## 2018-05-25 RX ADMIN — METOPROLOL TARTRATE 25 MG: 25 TABLET ORAL at 08:02

## 2018-05-25 RX ADMIN — ASPIRIN 81 MG: 81 TABLET, COATED ORAL at 08:02

## 2018-05-25 RX ADMIN — SPIRONOLACTONE 25 MG: 25 TABLET, FILM COATED ORAL at 08:02

## 2018-05-25 RX ADMIN — FUROSEMIDE 20 MG: 20 TABLET ORAL at 08:02

## 2018-05-25 RX ADMIN — FINASTERIDE 5 MG: 5 TABLET, FILM COATED ORAL at 08:02

## 2018-05-25 RX ADMIN — IPRATROPIUM BROMIDE AND ALBUTEROL SULFATE 3 ML: 2.5; .5 SOLUTION RESPIRATORY (INHALATION) at 08:22

## 2018-05-25 NOTE — PLAN OF CARE
Problem: Patient Care Overview  Goal: Plan of Care Review  Outcome: Ongoing (interventions implemented as appropriate)   05/25/18 7786   Coping/Psychosocial   Plan of Care Reviewed With patient   Plan of Care Review   Progress improving   OTHER   Outcome Summary Pt has c/o CP. Med: lopressor discussed with pt vs.carvediol. I.S. use demonstrated correctly per pt with vol.=300, cough productive & freq without use of IS. 2L NC in use to keep Sat's >91%. BSC/urinal within pt's reach & instructed pt to call staff before getting out of bed for safety.

## 2018-05-25 NOTE — PROGRESS NOTES
"[facility] Progress Note     LOS: 0 days   Patient Care Team:  Miguel Rojas MD as PCP - General  Miguel Rojas MD as PCP - Family Medicine  PCP:  Miguel Rojas MD    Chief Complaint:  cad    SUBJECTIVE: cath yesterday revealed nonobstructive cad. Pt feeling better this am. No CP.       Review of Systems:   All systems have been reviewed and are negative with the exception of those mentioned above.      OBJECTIVE:    Vital Sign Min/Max for last 24 hours  Temp  Min: 98.1 °F (36.7 °C)  Max: 98.1 °F (36.7 °C)   BP  Min: 87/57  Max: 137/67   Pulse  Min: 63  Max: 96   Resp  Min: 16  Max: 18   SpO2  Min: 88 %  Max: 98 %   Flow (L/min)  Min: 2  Max: 3   Weight  Min: 70.8 kg (156 lb 1.4 oz)  Max: 71.7 kg (158 lb)     Flowsheet Rows      First Filed Value   Admission Height  172.7 cm (68\") Documented at 05/24/2018 1256   Admission Weight  70.8 kg (156 lb 1.4 oz) Documented at 05/24/2018 1256          Telemetry: nsr    No intake or output data in the 24 hours ending 05/25/18 0804  Intake & Output (last 3 days)       05/22 0701 - 05/23 0700 05/23 0701 - 05/24 0700 05/24 0701 - 05/25 0700 05/25 0701 - 05/26 0700            Unmeasured Urine Occurrence   4 x     Unmeasured Stool Occurrence   1 x            Physical Exam:  Physical Exam   Constitutional: He is oriented to person, place, and time. He appears well-developed and well-nourished. No distress.   Cardiovascular: Normal rate, regular rhythm and intact distal pulses.    Murmur heard.  Pulses:       Radial pulses are 2+ on the right side, and 2+ on the left side.        Dorsalis pedis pulses are 2+ on the right side, and 2+ on the left side.        Posterior tibial pulses are 2+ on the right side, and 2+ on the left side.   Pulmonary/Chest: Effort normal. He has decreased breath sounds. He has wheezes. He has no rales.   Abdominal: Soft. Bowel sounds are normal. There is no tenderness. There is no guarding.   Musculoskeletal: He " exhibits no edema or tenderness.   Neurological: He is alert and oriented to person, place, and time.   Skin: Skin is warm and dry. No rash noted.   Psychiatric: He has a normal mood and affect.   Nursing note and vitals reviewed.       LABS/DIAGNOSTIC DATA:    Results from last 7 days  Lab Units 05/24/18  1259 05/23/18  1535 05/22/18  1057   WBC 10*3/mm3 10.45 11.67*  --    HEMOGLOBIN g/dL 12.7* 12.0* 12.2*   HEMATOCRIT % 40.3 36.9* 38.4*   PLATELETS 10*3/mm3 226 191 200     No results found for: TROPONINT        Results from last 7 days  Lab Units 05/25/18  0353 05/24/18  1300 05/23/18  1535 05/22/18  1057   SODIUM mmol/L 144 143 143 145   POTASSIUM mmol/L 3.8 3.7 3.7 3.9   CHLORIDE mmol/L 102 99 101 101   CO2 mmol/L 36.0* 34.0* 36.0*  --    TOTAL CO2, ARTERIAL mmol/L  --   --   --  36.0*   BUN mg/dL 40* 42* 42* 31*   CREATININE mg/dL 1.60* 1.40* 1.30 1.10   CALCIUM mg/dL 8.9 9.9 9.9 10.0   BILIRUBIN mg/dL  --   --  0.9 1.0   ALK PHOS U/L  --   --  73 78   ALT (SGPT) U/L  --   --  29 27   AST (SGOT) U/L  --   --  21 21   GLUCOSE mg/dL 93 79 79  --            Results from last 7 days  Lab Units 05/24/18  1300   CHOLESTEROL mg/dL 156   TRIGLYCERIDES mg/dL 104   HDL CHOL mg/dL 52   LDL CHOL mg/dL 87       Results from last 7 days  Lab Units 05/22/18  1057   TSH mIU/mL 1.150           Medication Review:     aspirin 81 mg Oral Daily   atorvastatin 40 mg Oral Nightly   baclofen 10 mg Oral Q8H   budesonide-formoterol 2 puff Inhalation BID - RT   clopidogrel 75 mg Oral Daily   finasteride 5 mg Oral Daily   furosemide 20 mg Oral Daily   ipratropium-albuterol 3 mL Nebulization Q6H - RT   metoprolol tartrate 25 mg Oral Q12H   oxybutynin 5 mg Oral Nightly   predniSONE 10 mg Oral Daily   spironolactone 25 mg Oral Daily   tamsulosin 0.4 mg Oral Nightly            Principal Problem:    Atherosclerosis of native coronary artery of native heart with unstable angina pectoris      ASSESSMENT/PLAN:  CAD  -s/p LHC per Dr. Robertson  5/24/18. Nonobstructive CAD  -Cont ASA, statin  -change coreg to metoprolol for better antianginal effect    HTN  -controlled    HLD  -statin    COPD    CKD  -bump in BUN/Cr from 42/1.3 to 40/1.6 1 day post cath. Hold ARB. Outpt BMP next week. Encouraged pt to stay hydrated    Pt to get BMP next week. Track BP at home off losartan. Plan to restart following BMP if Cr is ok. Pt to call if any more CP and we could add other antianginal rx.   Pt to f/u w Dr. Chang as scheduled in [place].     Shawanda Swain PA-C as scribe for dr chang  05/25/18  8:04 AM  Iluz marina md, personally performed the services described in this documentation as scribed by the above named individual in my presence, and it is both accurate and complete.  5/25/2018  8:50 AM

## 2018-05-27 ENCOUNTER — RESULTS ENCOUNTER (OUTPATIENT)
Dept: INTERNAL MEDICINE | Facility: CLINIC | Age: 68
End: 2018-05-27

## 2018-05-27 DIAGNOSIS — E78.5 HYPERLIPIDEMIA, UNSPECIFIED HYPERLIPIDEMIA TYPE: ICD-10-CM

## 2018-05-27 DIAGNOSIS — E11.8 TYPE 2 DIABETES MELLITUS WITH COMPLICATION, WITHOUT LONG-TERM CURRENT USE OF INSULIN (HCC): ICD-10-CM

## 2018-05-27 DIAGNOSIS — I50.22 CHRONIC SYSTOLIC CONGESTIVE HEART FAILURE (HCC): ICD-10-CM

## 2018-05-29 ENCOUNTER — TELEPHONE (OUTPATIENT)
Dept: CARDIOLOGY | Facility: CLINIC | Age: 68
End: 2018-05-29

## 2018-05-29 ENCOUNTER — TRANSITIONAL CARE MANAGEMENT TELEPHONE ENCOUNTER (OUTPATIENT)
Dept: INTERNAL MEDICINE | Facility: CLINIC | Age: 68
End: 2018-05-29

## 2018-05-29 LAB
BUN SERPL-MCNC: 45 MG/DL (ref 7–20)
BUN/CREAT SERPL: 32.1 (ref 6.3–21.9)
CALCIUM SERPL-MCNC: 9.8 MG/DL (ref 8.4–10.2)
CHLORIDE SERPL-SCNC: 95 MMOL/L (ref 98–107)
CO2 SERPL-SCNC: 36 MMOL/L (ref 26–30)
CREAT SERPL-MCNC: 1.4 MG/DL (ref 0.6–1.3)
GFR SERPLBLD CREATININE-BSD FMLA CKD-EPI: 50 ML/MIN/1.73
GFR SERPLBLD CREATININE-BSD FMLA CKD-EPI: 61 ML/MIN/1.73
GLUCOSE SERPL-MCNC: 140 MG/DL (ref 74–98)
POTASSIUM SERPL-SCNC: 4 MMOL/L (ref 3.5–5.1)
SODIUM SERPL-SCNC: 143 MMOL/L (ref 137–145)

## 2018-05-29 RX ORDER — ALFUZOSIN HYDROCHLORIDE 10 MG/1
TABLET, EXTENDED RELEASE ORAL
Qty: 30 TABLET | Refills: 0 | Status: SHIPPED | OUTPATIENT
Start: 2018-05-29 | End: 2018-06-13 | Stop reason: SDUPTHER

## 2018-05-31 ENCOUNTER — OFFICE VISIT (OUTPATIENT)
Dept: INTERNAL MEDICINE | Facility: CLINIC | Age: 68
End: 2018-05-31

## 2018-05-31 VITALS
HEART RATE: 79 BPM | WEIGHT: 159.38 LBS | DIASTOLIC BLOOD PRESSURE: 70 MMHG | OXYGEN SATURATION: 91 % | HEIGHT: 68 IN | BODY MASS INDEX: 24.15 KG/M2 | SYSTOLIC BLOOD PRESSURE: 140 MMHG | TEMPERATURE: 98.6 F

## 2018-05-31 DIAGNOSIS — J44.9 STEROID-DEPENDENT COPD (HCC): ICD-10-CM

## 2018-05-31 DIAGNOSIS — I10 ESSENTIAL HYPERTENSION: ICD-10-CM

## 2018-05-31 DIAGNOSIS — E11.8 TYPE 2 DIABETES MELLITUS WITH COMPLICATION, WITHOUT LONG-TERM CURRENT USE OF INSULIN (HCC): ICD-10-CM

## 2018-05-31 DIAGNOSIS — I48.91 ATRIAL FIBRILLATION, UNSPECIFIED TYPE (HCC): ICD-10-CM

## 2018-05-31 DIAGNOSIS — E78.2 MIXED HYPERLIPIDEMIA: ICD-10-CM

## 2018-05-31 DIAGNOSIS — N18.30 CHRONIC KIDNEY DISEASE, STAGE III (MODERATE) (HCC): ICD-10-CM

## 2018-05-31 DIAGNOSIS — I25.10 ATHEROSCLEROSIS OF NATIVE CORONARY ARTERY OF NATIVE HEART WITHOUT ANGINA PECTORIS: Primary | ICD-10-CM

## 2018-05-31 DIAGNOSIS — I50.22 CHRONIC SYSTOLIC CONGESTIVE HEART FAILURE (HCC): ICD-10-CM

## 2018-05-31 DIAGNOSIS — I25.110 ATHEROSCLEROSIS OF NATIVE CORONARY ARTERY OF NATIVE HEART WITH UNSTABLE ANGINA PECTORIS (HCC): ICD-10-CM

## 2018-05-31 PROCEDURE — 99496 TRANSJ CARE MGMT HIGH F2F 7D: CPT | Performed by: INTERNAL MEDICINE

## 2018-05-31 NOTE — PROGRESS NOTES
Transitional Care Follow Up Visit  Subjective     Jani LAUREN Pena is a 68 y.o. male who presents for a transitional care management visit.    Within 48 business hours after discharge our office contacted him via telephone to coordinate his care and needs.      I reviewed and discussed the details of that call along with the discharge summary, hospital problems, inpatient lab results, inpatient diagnostic studies, and consultation reports with Jani.     Current outpatient and discharge medications have been reconciled for the patient.    Date of TCM Phone Call 5/29/2018   [facility]   Date of Admission 5/24/2018   Date of Discharge 5/25/2018   Discharge Disposition Home or Self Care     Risk for Readmission (LACE) Score: 10 (5/25/2018  6:00 AM)    History of Present Illness   Course During Hospital Stay: Patient was recently hospitalized for chest pain.  Cardiac catheter showed no significant obstructive lesion.  Patient was started on metoprolol.  Denies any worsening of short of breath now.  Blood pressure normal on medication.  Losartan was also followed for increase creatinine.  COPD stable     The following portions of the patient's history were reviewed and updated as appropriate: allergies, current medications, past family history, past medical history, past social history, past surgical history and problem list.    Review of Systems   Constitutional: Negative.    Respiratory: Positive for shortness of breath.    Cardiovascular: Negative.    Gastrointestinal: Negative.    Musculoskeletal: Negative.    Skin: Negative.    Neurological: Negative.    Psychiatric/Behavioral: Negative.        Objective   Physical Exam   Constitutional: He is oriented to person, place, and time. He appears well-nourished.   Neck: Neck supple.   Cardiovascular: Normal rate, regular rhythm and normal heart sounds.    Pulmonary/Chest: Effort normal and breath sounds normal.   Abdominal: Bowel sounds are  normal.   Musculoskeletal: He exhibits edema.   Neurological: He is alert and oriented to person, place, and time.   Skin: Skin is warm.   Psychiatric: He has a normal mood and affect.       Assessment/Plan   Jani was seen today for transitional care management.    Diagnoses and all orders for this visit:    Atherosclerosis of native coronary artery of native heart without angina pectoris    Atrial fibrillation, unspecified type continue med    Mixed hyperlipidemia    Essential hypertension continue medication    Chronic systolic congestive heart failure continue medication patient is on 2 diuretics now    Atherosclerosis of native coronary artery of native heart continue medication follow-up with cardiologist    Steroid-dependent COPD continue medication    Type 2 diabetes mellitus with complication, without long-term current use of insulin    Chronic kidney disease, stage III (moderate)  -     Basic Metabolic Panel           HTN continue med  CAD cath No sig obst  CKD Cr 1.4 repeat if Cr normal resume ARB  COPD stable now  2 weeks after lab

## 2018-06-04 RX ORDER — SPIRONOLACTONE 25 MG/1
TABLET ORAL
Qty: 30 TABLET | Refills: 3 | Status: SHIPPED | OUTPATIENT
Start: 2018-06-04 | End: 2019-02-25 | Stop reason: SDUPTHER

## 2018-06-11 ENCOUNTER — TELEPHONE (OUTPATIENT)
Dept: CARDIOLOGY | Facility: CLINIC | Age: 68
End: 2018-06-11

## 2018-06-11 LAB
BUN SERPL-MCNC: 33 MG/DL (ref 7–20)
BUN/CREAT SERPL: 27.5 (ref 6.3–21.9)
CALCIUM SERPL-MCNC: 9.6 MG/DL (ref 8.4–10.2)
CHLORIDE SERPL-SCNC: 98 MMOL/L (ref 98–107)
CO2 SERPL-SCNC: 36 MMOL/L (ref 26–30)
CREAT SERPL-MCNC: 1.2 MG/DL (ref 0.6–1.3)
GFR SERPLBLD CREATININE-BSD FMLA CKD-EPI: 60 ML/MIN/1.73
GFR SERPLBLD CREATININE-BSD FMLA CKD-EPI: 73 ML/MIN/1.73
GLUCOSE SERPL-MCNC: 95 MG/DL (ref 74–98)
POTASSIUM SERPL-SCNC: 3.8 MMOL/L (ref 3.5–5.1)
SODIUM SERPL-SCNC: 142 MMOL/L (ref 137–145)

## 2018-06-11 RX ORDER — BACLOFEN 10 MG/1
TABLET ORAL
Qty: 30 TABLET | Refills: 0 | Status: SHIPPED | OUTPATIENT
Start: 2018-06-11 | End: 2018-06-12 | Stop reason: SDUPTHER

## 2018-06-11 NOTE — TELEPHONE ENCOUNTER
Patient called and stated that [employer] nurse wanted him to call us about his edema and weight gain. Advised patient to get labs Dr Rojas ordered as we need to check his kidney function and see if he can move appt up with Dr Rojas to tomorrow if possible to evaluate him as he can also adjust directrices then if needed can move up apt with Dr Bass. Patient verbalized understanding.

## 2018-06-12 ENCOUNTER — OFFICE VISIT (OUTPATIENT)
Dept: INTERNAL MEDICINE | Facility: CLINIC | Age: 68
End: 2018-06-12

## 2018-06-12 VITALS
HEART RATE: 76 BPM | TEMPERATURE: 97.7 F | HEIGHT: 68 IN | WEIGHT: 163 LBS | RESPIRATION RATE: 14 BRPM | DIASTOLIC BLOOD PRESSURE: 66 MMHG | BODY MASS INDEX: 24.71 KG/M2 | SYSTOLIC BLOOD PRESSURE: 120 MMHG | OXYGEN SATURATION: 91 %

## 2018-06-12 DIAGNOSIS — I50.22 CHRONIC SYSTOLIC CONGESTIVE HEART FAILURE (HCC): ICD-10-CM

## 2018-06-12 DIAGNOSIS — R14.0 ABDOMINAL BLOATING: ICD-10-CM

## 2018-06-12 DIAGNOSIS — I25.110 ATHEROSCLEROSIS OF NATIVE CORONARY ARTERY OF NATIVE HEART WITH UNSTABLE ANGINA PECTORIS (HCC): ICD-10-CM

## 2018-06-12 DIAGNOSIS — J44.9 STEROID-DEPENDENT COPD (HCC): ICD-10-CM

## 2018-06-12 DIAGNOSIS — E11.8 TYPE 2 DIABETES MELLITUS WITH COMPLICATION, WITHOUT LONG-TERM CURRENT USE OF INSULIN (HCC): ICD-10-CM

## 2018-06-12 DIAGNOSIS — K21.9 GERD WITHOUT ESOPHAGITIS: ICD-10-CM

## 2018-06-12 DIAGNOSIS — E78.2 MIXED HYPERLIPIDEMIA: ICD-10-CM

## 2018-06-12 DIAGNOSIS — I10 ESSENTIAL HYPERTENSION: ICD-10-CM

## 2018-06-12 DIAGNOSIS — I48.91 ATRIAL FIBRILLATION, UNSPECIFIED TYPE (HCC): ICD-10-CM

## 2018-06-12 DIAGNOSIS — M54.2 NECK PAIN: ICD-10-CM

## 2018-06-12 DIAGNOSIS — I25.10 ATHEROSCLEROSIS OF NATIVE CORONARY ARTERY OF NATIVE HEART WITHOUT ANGINA PECTORIS: Primary | ICD-10-CM

## 2018-06-12 PROCEDURE — 99214 OFFICE O/P EST MOD 30 MIN: CPT | Performed by: INTERNAL MEDICINE

## 2018-06-12 RX ORDER — FUROSEMIDE 20 MG/1
20 TABLET ORAL DAILY
Qty: 30 TABLET | Refills: 1 | Status: SHIPPED | OUTPATIENT
Start: 2018-06-12 | End: 2018-09-17 | Stop reason: SDUPTHER

## 2018-06-12 RX ORDER — BACLOFEN 10 MG/1
10 TABLET ORAL 3 TIMES DAILY
Qty: 90 TABLET | Refills: 1 | Status: SHIPPED | OUTPATIENT
Start: 2018-06-12 | End: 2018-08-20 | Stop reason: SDUPTHER

## 2018-06-12 RX ORDER — ALPRAZOLAM 0.5 MG/1
0.5 TABLET ORAL NIGHTLY PRN
Qty: 30 TABLET | Refills: 3 | Status: SHIPPED | OUTPATIENT
Start: 2018-06-12 | End: 2018-12-17 | Stop reason: SDUPTHER

## 2018-06-12 RX ORDER — LOSARTAN POTASSIUM 50 MG/1
50 TABLET ORAL DAILY
Qty: 30 TABLET | Refills: 3 | Status: SHIPPED | OUTPATIENT
Start: 2018-06-12 | End: 2018-07-26

## 2018-06-12 NOTE — PROGRESS NOTES
Subjective   Jani Pena is a 68 y.o. male.     Chief Complaint   Patient presents with   • Follow-up     pt is still retaining fluid - discuss        History of Present Illness   Patient here for follow-up.  Congestive heart failure stable and now patient is taken to 2 water pill.  COPD stable on medication.  Diabetes is stable without medication.  Hypertension stable medication.  Lower extremity edema improved patient complains bloating denies any abdominal pain nausea vomiting last bowel movement was 3 days ago.  Patient is a gaining weight.    Current Outpatient Prescriptions:   •  alfuzosin (UROXATRAL) 10 MG 24 hr tablet, TAKE ONE TABLET BY MOUTH DAILY, Disp: 30 tablet, Rfl: 0  •  ALPRAZolam (XANAX) 0.5 MG tablet, TAKE ONE TABLET BY MOUTH EVERY NIGHT AT BEDTIME AS NEEDED FOR ANXIETY, Disp: 30 tablet, Rfl: 3  •  aspirin 81 MG EC tablet, Take 1 tablet by mouth Daily., Disp: 90 tablet, Rfl: 3  •  atorvastatin (LIPITOR) 40 MG tablet, TAKE ONE TABLET BY MOUTH EVERY NIGHT AT BEDTIME, Disp: 30 tablet, Rfl: 5  •  baclofen (LIORESAL) 10 MG tablet, TAKE ONE TABLET BY MOUTH THREE TIMES A DAY, Disp: 30 tablet, Rfl: 0  •  BREO ELLIPTA 200-25 MCG/INH aerosol powder , Inhale 1 each Daily., Disp: 60 each, Rfl: 5  •  budesonide (PULMICORT) 0.5 MG/2ML nebulizer solution, Take 2 mL by nebulization 2 (Two) Times a Day., Disp: , Rfl:   •  calcium carbonate (OS-HAFSA) 600 MG tablet, Take 600 mg by mouth 2 (Two) Times a Day With Meals., Disp: , Rfl:   •  clopidogrel (PLAVIX) 75 MG tablet, TAKE ONE TABLET BY MOUTH DAILY, Disp: 30 tablet, Rfl: 5  •  escitalopram (LEXAPRO) 10 MG tablet, Take 1 tablet by mouth Daily. (Patient taking differently: Take 10 mg by mouth Daily As Needed.), Disp: 90 tablet, Rfl: 3  •  finasteride (PROSCAR) 5 MG tablet, Take 5 mg by mouth Daily., Disp: , Rfl:   •  fluticasone (FLONASE) 50 MCG/ACT nasal spray, 1 spray into each nostril Daily., Disp: 16 g, Rfl: 5  •  furosemide (LASIX) 20 MG tablet, Take 1  tablet by mouth Daily., Disp: 30 tablet, Rfl: 1  •  ipratropium-albuterol (DUO-NEB) 0.5-2.5 mg/mL nebulizer, Take 3 mL by nebulization Every 6 (Six) Hours., Disp: 360 mL, Rfl:   •  iron polysaccharides (NIFEREX) 150 MG capsule, Take 150 mg by mouth 2 (Two) Times a Day., Disp: , Rfl:   •  metoprolol tartrate (LOPRESSOR) 25 MG tablet, Take 1 tablet by mouth Every 12 (Twelve) Hours., Disp: 180 tablet, Rfl: 3  •  nitroglycerin (NITROSTAT) 0.4 MG SL tablet, Place 0.4 mg under the tongue Every 5 (Five) Minutes As Needed., Disp: , Rfl:   •  O2 (OXYGEN), Inhale 4 L/min 1 (One) Time. Walking on 4L and sitting 3L, Disp: , Rfl:   •  oxybutynin (DITROPAN) 5 MG tablet, TAKE ONE TABLET BY MOUTH EVERY NIGHT AT BEDTIME, Disp: 30 tablet, Rfl: 2  •  predniSONE (DELTASONE) 10 MG tablet, TAKE ONE TABLET BY MOUTH DAILY, Disp: 30 tablet, Rfl: 2  •  PROAIR  (90 BASE) MCG/ACT inhaler, Inhale 2 puffs Every 4 (Four) Hours As Needed for Wheezing or Shortness of Air., Disp: , Rfl:   •  spironolactone (ALDACTONE) 25 MG tablet, TAKE ONE TABLET BY MOUTH DAILY, Disp: 30 tablet, Rfl: 3    The following portions of the patient's history were reviewed and updated as appropriate: allergies, current medications, past family history, past medical history, past social history, past surgical history and problem list.    Review of Systems   Constitutional: Negative.    Respiratory: Positive for shortness of breath and wheezing.    Cardiovascular: Negative.    Gastrointestinal: Negative.    Musculoskeletal: Negative.    Skin: Negative.    Neurological: Negative.    Psychiatric/Behavioral: Negative.        Objective   Physical Exam   Constitutional: He is oriented to person, place, and time. He appears well-nourished.   Neck: Neck supple.   Cardiovascular: Normal rate, regular rhythm and normal heart sounds.    Pulmonary/Chest: Effort normal and breath sounds normal.   Abdominal: Bowel sounds are normal.   Musculoskeletal: He exhibits edema.    Neurological: He is alert and oriented to person, place, and time.   Skin: Skin is warm.   Psychiatric: He has a normal mood and affect.       All tests have been reviewed.    Assessment/Plan   There are no diagnoses linked to this encounter.               Atrial fibrillation, unspecified type continue med     Mixed hyperlipidemia     Chronic systolic congestive heart failure continue medication patient is on 2 diuretics now     Atherosclerosis of native coronary artery of native heart continue medication follow-up with cardiologist    CAD cath No sig obst  CKD Cr 1.2  resume ARB losartan 50mg daily, follow renal, need to check K+ next    COPD continue inhaler chronic prednisone 10mg. Follow up with lung doctor this PM, continue meds and O2 on rehab  Dysphagia follow GI  Left neck pain continue baclofen tylenol and PT  osteopenia, oscal D 500mg bid continue  left low back pain resolved follow up with ortho s/p left hip surgery for severe OA doing well.   alkaline phosphatase still high watch---  divertic hemorroid on colon 1/2013  DM stable without med  phimosis seen by uro declined surgery  Hematuria followup with urologist s/p cystoscope bladder stones  Hypertension continue med  BPH enlarged on CT scan follow up with urologist.  Anemia workup all negative.? anemia of chronic disease due to chronic disease   History of the CAD atrial fibrillation VSD status post occlusion stable now. Status post a defibrillator seen by cardio follow up with cardio, occa chest pain patient to call cardio today  History of a TIA patient is on Plavix continue  pneumovax done, prevnar 13 1/19/17, Td to HD.flushot done, shingrix informed  Depression cont lexapro 20  Decline to disrobe for PE  right inquinal hernia decline surgery now, call if worse  BLE edema mild, watch for now, cut down salt, avoid soda  Neck pain chiro for now   4week and due for  wellnexx

## 2018-06-13 RX ORDER — ALFUZOSIN HYDROCHLORIDE 10 MG/1
10 TABLET, EXTENDED RELEASE ORAL DAILY
Qty: 30 TABLET | Refills: 3 | Status: SHIPPED | OUTPATIENT
Start: 2018-06-13 | End: 2018-10-22 | Stop reason: SDUPTHER

## 2018-06-14 ENCOUNTER — HOSPITAL ENCOUNTER (OUTPATIENT)
Dept: ULTRASOUND IMAGING | Facility: HOSPITAL | Age: 68
Discharge: HOME | End: 2018-06-14
Admitting: INTERNAL MEDICINE

## 2018-06-14 DIAGNOSIS — R14.0 ABDOMINAL BLOATING: ICD-10-CM

## 2018-06-14 PROCEDURE — 76700 US EXAM ABDOM COMPLETE: CPT

## 2018-07-26 ENCOUNTER — OFFICE VISIT (OUTPATIENT)
Dept: CARDIOLOGY | Facility: CLINIC | Age: 68
End: 2018-07-26

## 2018-07-26 VITALS
BODY MASS INDEX: 24.96 KG/M2 | HEIGHT: 67 IN | DIASTOLIC BLOOD PRESSURE: 60 MMHG | HEART RATE: 85 BPM | OXYGEN SATURATION: 92 % | SYSTOLIC BLOOD PRESSURE: 112 MMHG | WEIGHT: 159 LBS

## 2018-07-26 DIAGNOSIS — E78.2 MIXED HYPERLIPIDEMIA: ICD-10-CM

## 2018-07-26 DIAGNOSIS — I10 ESSENTIAL HYPERTENSION: ICD-10-CM

## 2018-07-26 DIAGNOSIS — I50.22 CHRONIC SYSTOLIC CONGESTIVE HEART FAILURE (HCC): ICD-10-CM

## 2018-07-26 DIAGNOSIS — I25.10 CORONARY ARTERY DISEASE INVOLVING NATIVE CORONARY ARTERY OF NATIVE HEART WITHOUT ANGINA PECTORIS: Primary | ICD-10-CM

## 2018-07-26 PROCEDURE — 99214 OFFICE O/P EST MOD 30 MIN: CPT | Performed by: INTERNAL MEDICINE

## 2018-07-26 NOTE — PROGRESS NOTES
Jani Pena  1950  [phone]      VISIT DATE: 7/26/2018     PCP: Miguel Rojas MD  [address] [facility] [address] [facility] [address]  [facility] [place] [address]    IDENTIFICATION: A 68 y.o. male retired from [employer]     1. CAD  1. 1994, 2008- [facility] nonobstr  2. 2011 SE wnl  3. 5/18 nonobst cad   2. Abnl Echo  1. 2013 EF 45 w BBB  2. 1/3/18 echo: EF 45-50%, moderate LA enlargement, mitral thickening with mild MR, sclerosed aortic valve with mild AI, mild TR, apical segmental hypokinesis  3. CVD  1. B CEA -[facility] remote  2. CUS [facility] 2014 nonobst  4. HTN  Remote renal artery stent  5. HL  1. 6/14 137/149/42/65  6. CKD 3  7. PEDRO  8. COPD  9. PFO closure Dr Alves    Allergies  Allergies   Allergen Reactions   • Mucomyst [Acetylcysteine] Other (See Comments)     bronchospasms   • Milk-Related Compounds Nausea And Vomiting   • Sulfa Antibiotics Nausea And Vomiting       Current Medications    Current Outpatient Prescriptions:   •  alfuzosin (UROXATRAL) 10 MG 24 hr tablet, Take 1 tablet by mouth Daily., Disp: 30 tablet, Rfl: 3  •  ALPRAZolam (XANAX) 0.5 MG tablet, Take 1 tablet by mouth At Night As Needed for Anxiety., Disp: 30 tablet, Rfl: 3  •  aspirin 81 MG EC tablet, Take 1 tablet by mouth Daily., Disp: 90 tablet, Rfl: 3  •  atorvastatin (LIPITOR) 40 MG tablet, TAKE ONE TABLET BY MOUTH EVERY NIGHT AT BEDTIME, Disp: 30 tablet, Rfl: 5  •  baclofen (LIORESAL) 10 MG tablet, Take 1 tablet by mouth 3 (Three) Times a Day., Disp: 90 tablet, Rfl: 1  •  BREO ELLIPTA 200-25 MCG/INH aerosol powder , Inhale 1 each Daily., Disp: 60 each, Rfl: 5  •  budesonide (PULMICORT) 0.5 MG/2ML nebulizer solution, Take 2 mL by nebulization 2 (Two) Times a Day., Disp: , Rfl:   •  calcium carbonate (OS-HAFSA) 600 MG tablet, Take 600 mg by mouth 2 (Two) Times a Day With Meals., Disp: , Rfl:   •  clopidogrel (PLAVIX) 75 MG tablet, TAKE ONE TABLET BY MOUTH DAILY, Disp: 30 tablet, Rfl: 5  •  escitalopram (LEXAPRO) 10 MG tablet, Take 1 tablet by mouth Daily. (Patient taking  differently: Take 10 mg by mouth Daily As Needed.), Disp: 90 tablet, Rfl: 3  •  finasteride (PROSCAR) 5 MG tablet, Take 5 mg by mouth Daily., Disp: , Rfl:   •  fluticasone (FLONASE) 50 MCG/ACT nasal spray, 1 spray into each nostril Daily., Disp: 16 g, Rfl: 5  •  furosemide (LASIX) 20 MG tablet, Take 1 tablet by mouth Daily., Disp: 30 tablet, Rfl: 1  •  ipratropium-albuterol (DUO-NEB) 0.5-2.5 mg/mL nebulizer, Take 3 mL by nebulization Every 6 (Six) Hours., Disp: 360 mL, Rfl:   •  iron polysaccharides (NIFEREX) 150 MG capsule, Take 150 mg by mouth 2 (Two) Times a Day., Disp: , Rfl:   •  metoprolol tartrate (LOPRESSOR) 25 MG tablet, Take 1 tablet by mouth Every 12 (Twelve) Hours., Disp: 180 tablet, Rfl: 3  •  nitroglycerin (NITROSTAT) 0.4 MG SL tablet, Place 0.4 mg under the tongue Every 5 (Five) Minutes As Needed., Disp: , Rfl:   •  O2 (OXYGEN), Inhale 4 L/min 1 (One) Time. Walking on 4L and sitting 3L, Disp: , Rfl:   •  oxybutynin (DITROPAN) 5 MG tablet, TAKE ONE TABLET BY MOUTH EVERY NIGHT AT BEDTIME, Disp: 30 tablet, Rfl: 2  •  predniSONE (DELTASONE) 10 MG tablet, TAKE ONE TABLET BY MOUTH DAILY, Disp: 30 tablet, Rfl: 2  •  PROAIR  (90 BASE) MCG/ACT inhaler, Inhale 2 puffs Every 4 (Four) Hours As Needed for Wheezing or Shortness of Air., Disp: , Rfl:   •  spironolactone (ALDACTONE) 25 MG tablet, TAKE ONE TABLET BY MOUTH DAILY, Disp: 30 tablet, Rfl: 3    History of Present Illness     Pt presents for early follow up after an ED visit  5/23/18 when he developed CP on the exercise bike at pulmonary rehab.  This was described as chest pressure and was moderate to severe.  The pain resolved with rest.  It was associated with diaphoresis, nausea, and vomiting.   LHC nonobst.  Remains fatigued.  ROS:  All systems have been reviewed and are negative with the exception of those mentioned in the HPI.    OBJECTIVE:    Vitals:    07/26/18 1442   BP: 112/60   BP Location: Right arm   Patient Position: Sitting   Pulse: 85  "  SpO2: 92%   Weight: 72.1 kg (159 lb)   Height: 170.2 cm (67\")       Physical Exam   Constitutional: He appears well-developed and well-nourished.   Neck: Normal range of motion. Neck supple. No hepatojugular reflux and no JVD present. Carotid bruit is not present. No tracheal deviation present. No thyromegaly present.   Cardiovascular: Normal rate, regular rhythm, S1 normal, S2 normal, intact distal pulses and normal pulses.  PMI is not displaced.  Exam reveals no gallop, no distant heart sounds, no friction rub, no midsystolic click and no opening snap.    Murmur heard.  Pulses:       Radial pulses are 2+ on the right side, and 2+ on the left side.        Dorsalis pedis pulses are 2+ on the right side, and 2+ on the left side.        Posterior tibial pulses are 2+ on the right side, and 2+ on the left side.   Pulmonary/Chest: He is in respiratory distress. He has wheezes. He has no rales.   Abdominal: Soft. Bowel sounds are normal. He exhibits no mass. There is no tenderness. There is no guarding.       Diagnostic Data:  Procedures      ASSESSMENT:   Diagnosis Plan   1. Coronary artery disease involving native coronary artery of native heart without angina pectoris     2. Chronic systolic congestive heart failure (CMS/HCC)     3. Essential hypertension     4. Mixed hyperlipidemia         PLAN:  CAD with recent episodes of unstable angina. After discussion with patient on risks and benefits, we will schedule LHC +/- CBI for tomorrow in [place].    COPD with chronic hypoxemia on high flow nasal cannula at baseline.  No apparent right heart failure at current clinically.    LV dysfunction mild with stage III CHF at current no necessity for diuresis.  Echo in January unchanged from previous.    BP acceptable. Continue statin therapy.    Dyslipidemia on statin therapy    Follow-up 6-9 months sooner if needed    Miguel Rojas MD, thank you for referring Mr. Pena for evaluation.  I have forwarded my electronically " generated recommendations to you for review.  Please do not hesitate to call with any questions.     7/26/2018  2:51 PM    Ulysses Bass MD, FACC

## 2018-08-07 DIAGNOSIS — N32.81 OAB (OVERACTIVE BLADDER): ICD-10-CM

## 2018-08-07 RX ORDER — OXYBUTYNIN CHLORIDE 5 MG/1
TABLET ORAL
Qty: 30 TABLET | Refills: 1 | Status: SHIPPED | OUTPATIENT
Start: 2018-08-07 | End: 2018-09-24 | Stop reason: SDUPTHER

## 2018-08-14 ENCOUNTER — OFFICE VISIT (OUTPATIENT)
Dept: INTERNAL MEDICINE | Facility: CLINIC | Age: 68
End: 2018-08-14

## 2018-08-14 ENCOUNTER — HOSPITAL ENCOUNTER (OUTPATIENT)
Dept: GENERAL RADIOLOGY | Facility: HOSPITAL | Age: 68
Discharge: HOME | End: 2018-08-14
Attending: INTERNAL MEDICINE | Admitting: INTERNAL MEDICINE

## 2018-08-14 VITALS
SYSTOLIC BLOOD PRESSURE: 124 MMHG | DIASTOLIC BLOOD PRESSURE: 60 MMHG | HEART RATE: 68 BPM | TEMPERATURE: 98.4 F | RESPIRATION RATE: 14 BRPM | OXYGEN SATURATION: 93 % | WEIGHT: 164 LBS | BODY MASS INDEX: 24.86 KG/M2 | HEIGHT: 68 IN

## 2018-08-14 DIAGNOSIS — R13.10 DYSPHAGIA, UNSPECIFIED TYPE: ICD-10-CM

## 2018-08-14 DIAGNOSIS — M54.16 LUMBAR RADICULOPATHY: Primary | ICD-10-CM

## 2018-08-14 DIAGNOSIS — M54.16 LUMBAR RADICULOPATHY: ICD-10-CM

## 2018-08-14 DIAGNOSIS — R93.6 ABNORMAL FINDINGS ON DIAGNOSTIC IMAGING OF LIMBS: ICD-10-CM

## 2018-08-14 DIAGNOSIS — M54.2 NECK PAIN: ICD-10-CM

## 2018-08-14 DIAGNOSIS — R53.83 OTHER FATIGUE: ICD-10-CM

## 2018-08-14 LAB
ALBUMIN SERPL-MCNC: 4.2 G/DL (ref 3.5–5)
ALBUMIN/GLOB SERPL: 1.7 G/DL (ref 1–2)
ALP SERPL-CCNC: 80 U/L (ref 38–126)
ALT SERPL-CCNC: 36 U/L (ref 13–69)
AST SERPL-CCNC: 33 U/L (ref 15–46)
BASOPHILS # BLD AUTO: 0.04 10*3/MM3 (ref 0–0.2)
BASOPHILS NFR BLD AUTO: 0.5 % (ref 0–2.5)
BILIRUB SERPL-MCNC: 1.1 MG/DL (ref 0.2–1.3)
BUN SERPL-MCNC: 39 MG/DL (ref 7–20)
BUN/CREAT SERPL: 30 (ref 6.3–21.9)
CALCIUM SERPL-MCNC: 10.4 MG/DL (ref 8.4–10.2)
CHLORIDE SERPL-SCNC: 97 MMOL/L (ref 98–107)
CO2 SERPL-SCNC: 36 MMOL/L (ref 26–30)
CREAT SERPL-MCNC: 1.3 MG/DL (ref 0.6–1.3)
EOSINOPHIL # BLD AUTO: 0.11 10*3/MM3 (ref 0–0.7)
EOSINOPHIL NFR BLD AUTO: 1.3 % (ref 0–7)
ERYTHROCYTE [DISTWIDTH] IN BLOOD BY AUTOMATED COUNT: 12.9 % (ref 11.5–14.5)
ERYTHROCYTE [SEDIMENTATION RATE] IN BLOOD BY WESTERGREN METHOD: 5 MM/HR (ref 0–15)
FOLATE SERPL-MCNC: >20 NG/ML
GLOBULIN SER CALC-MCNC: 2.5 GM/DL
GLUCOSE SERPL-MCNC: 101 MG/DL (ref 74–98)
HCT VFR BLD AUTO: 42.4 % (ref 42–52)
HGB BLD-MCNC: 13.6 G/DL (ref 14–18)
IMM GRANULOCYTES # BLD: 0.03 10*3/MM3 (ref 0–0.06)
IMM GRANULOCYTES NFR BLD: 0.4 % (ref 0–0.6)
LYMPHOCYTES # BLD AUTO: 1.01 10*3/MM3 (ref 0.6–3.4)
LYMPHOCYTES NFR BLD AUTO: 11.8 % (ref 10–50)
MCH RBC QN AUTO: 31 PG (ref 27–31)
MCHC RBC AUTO-ENTMCNC: 32.1 G/DL (ref 30–37)
MCV RBC AUTO: 96.6 FL (ref 80–94)
MONOCYTES # BLD AUTO: 0.48 10*3/MM3 (ref 0–0.9)
MONOCYTES NFR BLD AUTO: 5.6 % (ref 0–12)
NEUTROPHILS # BLD AUTO: 6.86 10*3/MM3 (ref 2–6.9)
NEUTROPHILS NFR BLD AUTO: 80.4 % (ref 37–80)
NRBC BLD AUTO-RTO: 0 /100 WBC (ref 0–0)
PLATELET # BLD AUTO: 219 10*3/MM3 (ref 130–400)
POTASSIUM SERPL-SCNC: 3.9 MMOL/L (ref 3.5–5.1)
PROT SERPL-MCNC: 6.7 G/DL (ref 6.3–8.2)
RBC # BLD AUTO: 4.39 10*6/MM3 (ref 4.7–6.1)
SODIUM SERPL-SCNC: 142 MMOL/L (ref 137–145)
TSH SERPL DL<=0.005 MIU/L-ACNC: 1 MIU/ML (ref 0.47–4.68)
VIT B12 SERPL-MCNC: 647 PG/ML (ref 239–931)
WBC # BLD AUTO: 8.53 10*3/MM3 (ref 4.8–10.8)

## 2018-08-14 PROCEDURE — 99214 OFFICE O/P EST MOD 30 MIN: CPT | Performed by: INTERNAL MEDICINE

## 2018-08-14 PROCEDURE — 72110 X-RAY EXAM L-2 SPINE 4/>VWS: CPT

## 2018-08-14 RX ORDER — TRAMADOL HYDROCHLORIDE 50 MG/1
50 TABLET ORAL EVERY 8 HOURS PRN
Qty: 60 TABLET | Refills: 0 | Status: SHIPPED | OUTPATIENT
Start: 2018-08-14 | End: 2018-08-14 | Stop reason: SDUPTHER

## 2018-08-14 RX ORDER — TRAMADOL HYDROCHLORIDE 50 MG/1
50 TABLET ORAL EVERY 8 HOURS PRN
Qty: 60 TABLET | Refills: 0 | Status: SHIPPED | OUTPATIENT
Start: 2018-08-14 | End: 2018-09-14 | Stop reason: SDUPTHER

## 2018-08-14 NOTE — TELEPHONE ENCOUNTER
Patient called requesting tram[facility] to be [facility]. He went to pick it up and it was not there.

## 2018-08-14 NOTE — PROGRESS NOTES
Subjective   Jani Pena is a 68 y.o. male.     Chief Complaint   Patient presents with   • Difficulty Swallowing   • Neck Pain     and shoulder pain    • Tingling     in extrimities        Back Pain   This is a new problem. Episode onset: 8 day. The problem occurs intermittently. The problem is unchanged. The pain is present in the gluteal. The quality of the pain is described as aching. The pain radiates to the left knee, left thigh and left foot. The pain is at a severity of 5/10. The pain is moderate. The pain is the same all the time. Stiffness is present all day. Associated symptoms include numbness, paresthesias and tingling. Pertinent negatives include no bladder incontinence, bowel incontinence, dysuria, perianal numbness or weight loss. Risk factors include history of steroid use. He has tried analgesics for the symptoms. The treatment provided no relief.    swallow problem hx of if s/p EGD  Also neck pain and shoulder pain no numb or tingling       Current Outpatient Prescriptions:   •  alfuzosin (UROXATRAL) 10 MG 24 hr tablet, Take 1 tablet by mouth Daily., Disp: 30 tablet, Rfl: 3  •  ALPRAZolam (XANAX) 0.5 MG tablet, Take 1 tablet by mouth At Night As Needed for Anxiety., Disp: 30 tablet, Rfl: 3  •  aspirin 81 MG EC tablet, Take 1 tablet by mouth Daily., Disp: 90 tablet, Rfl: 3  •  atorvastatin (LIPITOR) 40 MG tablet, TAKE ONE TABLET BY MOUTH EVERY NIGHT AT BEDTIME, Disp: 30 tablet, Rfl: 5  •  baclofen (LIORESAL) 10 MG tablet, Take 1 tablet by mouth 3 (Three) Times a Day., Disp: 90 tablet, Rfl: 1  •  BREO ELLIPTA 200-25 MCG/INH aerosol powder , Inhale 1 each Daily., Disp: 60 each, Rfl: 5  •  budesonide (PULMICORT) 0.5 MG/2ML nebulizer solution, Take 2 mL by nebulization 2 (Two) Times a Day., Disp: , Rfl:   •  calcium carbonate (OS-HAFSA) 600 MG tablet, Take 600 mg by mouth 2 (Two) Times a Day With Meals., Disp: , Rfl:   •  clopidogrel (PLAVIX) 75 MG tablet, TAKE ONE TABLET BY MOUTH DAILY, Disp: 30  tablet, Rfl: 5  •  escitalopram (LEXAPRO) 10 MG tablet, Take 1 tablet by mouth Daily. (Patient taking differently: Take 10 mg by mouth Daily As Needed.), Disp: 90 tablet, Rfl: 3  •  finasteride (PROSCAR) 5 MG tablet, Take 5 mg by mouth Daily., Disp: , Rfl:   •  fluticasone (FLONASE) 50 MCG/ACT nasal spray, 1 spray into each nostril Daily., Disp: 16 g, Rfl: 5  •  furosemide (LASIX) 20 MG tablet, Take 1 tablet by mouth Daily., Disp: 30 tablet, Rfl: 1  •  ipratropium-albuterol (DUO-NEB) 0.5-2.5 mg/mL nebulizer, Take 3 mL by nebulization Every 6 (Six) Hours., Disp: 360 mL, Rfl:   •  iron polysaccharides (NIFEREX) 150 MG capsule, Take 150 mg by mouth 2 (Two) Times a Day., Disp: , Rfl:   •  metoprolol tartrate (LOPRESSOR) 25 MG tablet, Take 1 tablet by mouth Every 12 (Twelve) Hours., Disp: 180 tablet, Rfl: 3  •  nitroglycerin (NITROSTAT) 0.4 MG SL tablet, Place 0.4 mg under the tongue Every 5 (Five) Minutes As Needed., Disp: , Rfl:   •  O2 (OXYGEN), Inhale 4 L/min 1 (One) Time. Walking on 4L and sitting 3L, Disp: , Rfl:   •  oxybutynin (DITROPAN) 5 MG tablet, TAKE ONE TABLET BY MOUTH EVERY NIGHT AT BEDTIME, Disp: 30 tablet, Rfl: 1  •  predniSONE (DELTASONE) 10 MG tablet, TAKE ONE TABLET BY MOUTH DAILY, Disp: 30 tablet, Rfl: 2  •  PROAIR  (90 BASE) MCG/ACT inhaler, Inhale 2 puffs Every 4 (Four) Hours As Needed for Wheezing or Shortness of Air., Disp: , Rfl:   •  spironolactone (ALDACTONE) 25 MG tablet, TAKE ONE TABLET BY MOUTH DAILY, Disp: 30 tablet, Rfl: 3  •  traMADol (ULTRAM) 50 MG tablet, Take 1 tablet by mouth Every 8 (Eight) Hours As Needed for Moderate Pain ., Disp: 60 tablet, Rfl: 0    The following portions of the patient's history were reviewed and updated as appropriate: allergies, current medications, past family history, past medical history, past social history, past surgical history and problem list.    Review of Systems   Constitutional: Negative.  Negative for weight loss.   Respiratory: Negative.     Cardiovascular: Negative.    Gastrointestinal: Negative.  Negative for bowel incontinence, nausea and vomiting.        Swallow problem   Genitourinary: Negative for bladder incontinence and dysuria.   Musculoskeletal: Positive for back pain.   Skin: Negative.    Neurological: Positive for tingling, numbness and paresthesias.   Psychiatric/Behavioral: Negative.        Objective   Physical Exam   Constitutional: He is oriented to person, place, and time. He appears well-nourished.   Neck: Neck supple.   Cardiovascular: Normal rate, regular rhythm and normal heart sounds.    Pulmonary/Chest: Effort normal and breath sounds normal.   Abdominal: Bowel sounds are normal.   Musculoskeletal: He exhibits tenderness.   Neurological: He is alert and oriented to person, place, and time.   Skin: Skin is warm.   Psychiatric: He has a normal mood and affect.       All tests have been reviewed.    Assessment/Plan   Diagnoses and all orders for this visit:    Lumbar radiculopathy  -     XR Spine Lumbar 4+ View; Future  -     DEXA Bone Density Axial  -     Ambulatory Referral to Physical Therapy    Dysphagia, unspecified type  -     Ambulatory Referral to Gastroenterology    Neck pain    Abnormal findings on diagnostic imaging of limbs   -     DEXA Bone Density Axial    Other orders  -     traMADol (ULTRAM) 50 MG tablet; Take 1 tablet by mouth Every 8 (Eight) Hours As Needed for Moderate Pain .      Fatigue and do labs       2 weeks

## 2018-08-16 ENCOUNTER — APPOINTMENT (OUTPATIENT)
Dept: BONE DENSITY | Facility: HOSPITAL | Age: 68
End: 2018-08-16

## 2018-08-16 PROCEDURE — 77080 DXA BONE DENSITY AXIAL: CPT

## 2018-08-20 RX ORDER — BACLOFEN 10 MG/1
TABLET ORAL
Qty: 90 TABLET | Refills: 0 | Status: SHIPPED | OUTPATIENT
Start: 2018-08-20 | End: 2018-09-24 | Stop reason: SDUPTHER

## 2018-08-21 ENCOUNTER — PREP FOR SURGERY (OUTPATIENT)
Dept: OTHER | Facility: HOSPITAL | Age: 68
End: 2018-08-21

## 2018-08-21 ENCOUNTER — OFFICE VISIT (OUTPATIENT)
Dept: GASTROENTEROLOGY | Facility: CLINIC | Age: 68
End: 2018-08-21

## 2018-08-21 VITALS
WEIGHT: 167 LBS | HEIGHT: 68 IN | HEART RATE: 90 BPM | DIASTOLIC BLOOD PRESSURE: 80 MMHG | RESPIRATION RATE: 20 BRPM | TEMPERATURE: 97 F | BODY MASS INDEX: 25.31 KG/M2 | SYSTOLIC BLOOD PRESSURE: 153 MMHG

## 2018-08-21 DIAGNOSIS — R12 HEARTBURN: ICD-10-CM

## 2018-08-21 DIAGNOSIS — R13.10 DYSPHAGIA, UNSPECIFIED TYPE: Primary | ICD-10-CM

## 2018-08-21 DIAGNOSIS — R13.10 DYSPHAGIA: Primary | ICD-10-CM

## 2018-08-21 DIAGNOSIS — D64.9 ANEMIA: ICD-10-CM

## 2018-08-21 DIAGNOSIS — D64.9 ANEMIA, UNSPECIFIED TYPE: ICD-10-CM

## 2018-08-21 PROCEDURE — 99214 OFFICE O/P EST MOD 30 MIN: CPT | Performed by: INTERNAL MEDICINE

## 2018-08-21 RX ORDER — PREDNISONE 10 MG/1
TABLET ORAL
Qty: 30 TABLET | Refills: 1 | Status: SHIPPED | OUTPATIENT
Start: 2018-08-21 | End: 2018-10-22 | Stop reason: SDUPTHER

## 2018-08-28 ENCOUNTER — OFFICE VISIT (OUTPATIENT)
Dept: INTERNAL MEDICINE | Facility: CLINIC | Age: 68
End: 2018-08-28

## 2018-08-28 ENCOUNTER — TELEPHONE (OUTPATIENT)
Dept: INTERNAL MEDICINE | Facility: CLINIC | Age: 68
End: 2018-08-28

## 2018-08-28 VITALS
OXYGEN SATURATION: 93 % | WEIGHT: 160 LBS | HEIGHT: 68 IN | HEART RATE: 92 BPM | RESPIRATION RATE: 14 BRPM | DIASTOLIC BLOOD PRESSURE: 70 MMHG | TEMPERATURE: 98.7 F | SYSTOLIC BLOOD PRESSURE: 144 MMHG | BODY MASS INDEX: 24.25 KG/M2

## 2018-08-28 DIAGNOSIS — M54.5 LOW BACK PAIN, UNSPECIFIED BACK PAIN LATERALITY, UNSPECIFIED CHRONICITY, WITH SCIATICA PRESENCE UNSPECIFIED: Primary | ICD-10-CM

## 2018-08-28 DIAGNOSIS — R06.02 SOB (SHORTNESS OF BREATH) ON EXERTION: ICD-10-CM

## 2018-08-28 DIAGNOSIS — R13.10 DYSPHAGIA, UNSPECIFIED TYPE: ICD-10-CM

## 2018-08-28 DIAGNOSIS — J44.9 STEROID-DEPENDENT COPD (HCC): ICD-10-CM

## 2018-08-28 PROCEDURE — 93000 ELECTROCARDIOGRAM COMPLETE: CPT | Performed by: INTERNAL MEDICINE

## 2018-08-28 PROCEDURE — 99214 OFFICE O/P EST MOD 30 MIN: CPT | Performed by: INTERNAL MEDICINE

## 2018-08-28 RX ORDER — LEVOFLOXACIN 500 MG/1
500 TABLET, FILM COATED ORAL DAILY
Qty: 7 TABLET | Refills: 0 | Status: SHIPPED | OUTPATIENT
Start: 2018-08-28 | End: 2018-09-13

## 2018-08-28 RX ORDER — AZITHROMYCIN 500 MG/1
500 TABLET, FILM COATED ORAL DAILY
Qty: 5 TABLET | Refills: 0 | Status: SHIPPED | OUTPATIENT
Start: 2018-08-28 | End: 2018-08-28 | Stop reason: ALTCHOICE

## 2018-08-28 RX ORDER — PREDNISONE 20 MG/1
TABLET ORAL
Qty: 15 TABLET | Refills: 0 | Status: SHIPPED | OUTPATIENT
Start: 2018-08-28 | End: 2019-02-05 | Stop reason: HOSPADM

## 2018-08-28 NOTE — PROGRESS NOTES
Subjective   Jani Pena is a 68 y.o. male.     Chief Complaint   Patient presents with   • Follow-up     2 week follow up    • Fatigue     no energy - feel like he has to struggle to get anything done    • Extremity Weakness   • Uncontrollable Need To Sleep       History of Present Illness   Patient here complains feeling tired no energy.  Patient felt weakness seeing both legs.  And struggling to do anything.  Patient states Saturday she overdid some physical work.  Patient denies any significant short of breath states slightly worsening of short of air.  O2 sat 93% today heart rate no significant change and 92.  Blood test in no significant changes either.  Patient does have severe COPD.  Patient is taking regular steroid.  Also complains mild chills temperature today 98.7.  Denies any fever.  Low back pain stable patient was unable to do physical therapy due to breathing condition.  Patient yet to do up endoscopy for dysphagia.    Current Outpatient Prescriptions:   •  alfuzosin (UROXATRAL) 10 MG 24 hr tablet, Take 1 tablet by mouth Daily., Disp: 30 tablet, Rfl: 3  •  ALPRAZolam (XANAX) 0.5 MG tablet, Take 1 tablet by mouth At Night As Needed for Anxiety., Disp: 30 tablet, Rfl: 3  •  aspirin 81 MG EC tablet, Take 1 tablet by mouth Daily., Disp: 90 tablet, Rfl: 3  •  atorvastatin (LIPITOR) 40 MG tablet, TAKE ONE TABLET BY MOUTH EVERY NIGHT AT BEDTIME, Disp: 30 tablet, Rfl: 5  •  baclofen (LIORESAL) 10 MG tablet, TAKE ONE TABLET BY MOUTH THREE TIMES A DAY, Disp: 90 tablet, Rfl: 0  •  BREO ELLIPTA 200-25 MCG/INH aerosol powder , Inhale 1 each Daily., Disp: 60 each, Rfl: 5  •  budesonide (PULMICORT) 0.5 MG/2ML nebulizer solution, Take 2 mL by nebulization 2 (Two) Times a Day., Disp: , Rfl:   •  calcium carbonate (OS-HAFSA) 600 MG tablet, Take 600 mg by mouth 2 (Two) Times a Day With Meals., Disp: , Rfl:   •  clopidogrel (PLAVIX) 75 MG tablet, TAKE ONE TABLET BY MOUTH DAILY, Disp: 30 tablet, Rfl: 5  •   escitalopram (LEXAPRO) 10 MG tablet, Take 1 tablet by mouth Daily. (Patient taking differently: Take 10 mg by mouth Daily As Needed.), Disp: 90 tablet, Rfl: 3  •  finasteride (PROSCAR) 5 MG tablet, Take 5 mg by mouth Daily., Disp: , Rfl:   •  fluticasone (FLONASE) 50 MCG/ACT nasal spray, 1 spray into each nostril Daily., Disp: 16 g, Rfl: 5  •  furosemide (LASIX) 20 MG tablet, Take 1 tablet by mouth Daily., Disp: 30 tablet, Rfl: 1  •  ipratropium-albuterol (DUO-NEB) 0.5-2.5 mg/mL nebulizer, Take 3 mL by nebulization Every 6 (Six) Hours., Disp: 360 mL, Rfl:   •  iron polysaccharides (NIFEREX) 150 MG capsule, Take 150 mg by mouth 2 (Two) Times a Day., Disp: , Rfl:   •  metoprolol tartrate (LOPRESSOR) 25 MG tablet, Take 1 tablet by mouth Every 12 (Twelve) Hours., Disp: 180 tablet, Rfl: 3  •  nitroglycerin (NITROSTAT) 0.4 MG SL tablet, Place 0.4 mg under the tongue Every 5 (Five) Minutes As Needed., Disp: , Rfl:   •  O2 (OXYGEN), Inhale 4 L/min 1 (One) Time. Walking on 4L and sitting 3L, Disp: , Rfl:   •  oxybutynin (DITROPAN) 5 MG tablet, TAKE ONE TABLET BY MOUTH EVERY NIGHT AT BEDTIME, Disp: 30 tablet, Rfl: 1  •  predniSONE (DELTASONE) 10 MG tablet, TAKE ONE TABLET BY MOUTH DAILY, Disp: 30 tablet, Rfl: 1  •  PROAIR  (90 BASE) MCG/ACT inhaler, Inhale 2 puffs Every 4 (Four) Hours As Needed for Wheezing or Shortness of Air., Disp: , Rfl:   •  spironolactone (ALDACTONE) 25 MG tablet, TAKE ONE TABLET BY MOUTH DAILY, Disp: 30 tablet, Rfl: 3  •  traMADol (ULTRAM) 50 MG tablet, Take 1 tablet by mouth Every 8 (Eight) Hours As Needed for Moderate Pain ., Disp: 60 tablet, Rfl: 0    The following portions of the patient's history were reviewed and updated as appropriate: allergies, current medications, past family history, past medical history, past social history, past surgical history and problem list.    Review of Systems   Constitutional: Negative.    Respiratory: Positive for shortness of breath and wheezing.     Cardiovascular: Negative.    Gastrointestinal: Negative.    Musculoskeletal: Negative.    Skin: Negative.    Neurological: Negative.    Psychiatric/Behavioral: Negative.        Objective   Physical Exam   Constitutional: He is oriented to person, place, and time. He appears well-nourished.   Neck: Neck supple.   Cardiovascular: Normal rate, regular rhythm and normal heart sounds.    Pulmonary/Chest:   resp difficulty no sig changes   Abdominal: Bowel sounds are normal.   Neurological: He is alert and oriented to person, place, and time.   Skin: Skin is warm.   Psychiatric: He has a normal mood and affect.       All tests have been reviewed.    Assessment/Plan   There are no diagnoses linked to this encounter.          Lumbar radiculopathy  -     XR Spine Lumbar 4+ View; NSC  -     DEXA Bone Density Axial normal  -     yet to do PT due to soa     Dysphagia, unspecified type  -     Ambulatory Referral to Gastroenterology     Neck pain     Fatigue and do labs      copd exac, chill without fever. soa worse somewhat. Start prednisone 60 for 5 days and zithromax to ER if worse.      1 weeks with others      ECG 12 Lead  Date/Time: 8/28/2018 12:54 PM  Performed by: RENNY CHOUDHARY  Authorized by: RENNY CHOUDHARY   Comparison: compared with previous ECG from 5/23/2018  Comparison to previous ECG: No sig changes  Rhythm: sinus rhythm and bundle branch block  Rate: normal  Conduction: right bundle branch block  ST Segments: ST segments normal  QRS axis: left  Clinical impression: abnormal ECG

## 2018-08-29 ENCOUNTER — TELEPHONE (OUTPATIENT)
Dept: INTERNAL MEDICINE | Facility: CLINIC | Age: 68
End: 2018-08-29

## 2018-08-29 NOTE — TELEPHONE ENCOUNTER
Had a vm from yesterday transferred to my phone from pt. asking if we have sent in his med. Ayde has not seen him. I think you took care of this yesterday though.

## 2018-09-04 RX ORDER — CLOPIDOGREL BISULFATE 75 MG/1
TABLET ORAL
Qty: 30 TABLET | Refills: 4 | Status: SHIPPED | OUTPATIENT
Start: 2018-09-04 | End: 2019-04-22 | Stop reason: SDUPTHER

## 2018-09-06 ENCOUNTER — OFFICE VISIT (OUTPATIENT)
Dept: INTERNAL MEDICINE | Facility: CLINIC | Age: 68
End: 2018-09-06

## 2018-09-06 VITALS
TEMPERATURE: 98.8 F | OXYGEN SATURATION: 95 % | HEART RATE: 96 BPM | SYSTOLIC BLOOD PRESSURE: 140 MMHG | HEIGHT: 68 IN | DIASTOLIC BLOOD PRESSURE: 80 MMHG

## 2018-09-06 DIAGNOSIS — R13.10 DYSPHAGIA, UNSPECIFIED TYPE: ICD-10-CM

## 2018-09-06 DIAGNOSIS — J44.9 STEROID-DEPENDENT COPD (HCC): ICD-10-CM

## 2018-09-06 DIAGNOSIS — I10 ESSENTIAL HYPERTENSION: Primary | ICD-10-CM

## 2018-09-06 DIAGNOSIS — K21.9 GERD WITHOUT ESOPHAGITIS: ICD-10-CM

## 2018-09-06 DIAGNOSIS — M54.16 LUMBAR RADICULOPATHY: ICD-10-CM

## 2018-09-06 PROCEDURE — 99214 OFFICE O/P EST MOD 30 MIN: CPT | Performed by: INTERNAL MEDICINE

## 2018-09-06 RX ORDER — SODIUM CHLORIDE 9 MG/ML
70 INJECTION, SOLUTION INTRAVENOUS CONTINUOUS PRN
Status: CANCELLED | OUTPATIENT
Start: 2018-09-06

## 2018-09-06 NOTE — PROGRESS NOTES
Subjective   Jani Pena is a 68 y.o. male.     Chief Complaint   Patient presents with   • Follow-up     1 week fu       History of Present Illness   COPD exacerbation improved after treatments.  Dysphagia pending upper endoscopy.  Low back pain improved.  She still complains neck pain.  Blood test reviewed and showed patient has anemia mild.    Current Outpatient Prescriptions:   •  alfuzosin (UROXATRAL) 10 MG 24 hr tablet, Take 1 tablet by mouth Daily., Disp: 30 tablet, Rfl: 3  •  ALPRAZolam (XANAX) 0.5 MG tablet, Take 1 tablet by mouth At Night As Needed for Anxiety., Disp: 30 tablet, Rfl: 3  •  aspirin 81 MG EC tablet, Take 1 tablet by mouth Daily., Disp: 90 tablet, Rfl: 3  •  atorvastatin (LIPITOR) 40 MG tablet, TAKE ONE TABLET BY MOUTH EVERY NIGHT AT BEDTIME, Disp: 30 tablet, Rfl: 5  •  baclofen (LIORESAL) 10 MG tablet, TAKE ONE TABLET BY MOUTH THREE TIMES A DAY, Disp: 90 tablet, Rfl: 0  •  BREO ELLIPTA 200-25 MCG/INH aerosol powder , Inhale 1 each Daily., Disp: 60 each, Rfl: 5  •  budesonide (PULMICORT) 0.5 MG/2ML nebulizer solution, Take 2 mL by nebulization 2 (Two) Times a Day., Disp: , Rfl:   •  calcium carbonate (OS-HAFSA) 600 MG tablet, Take 600 mg by mouth 2 (Two) Times a Day With Meals., Disp: , Rfl:   •  clopidogrel (PLAVIX) 75 MG tablet, TAKE ONE TABLET BY MOUTH DAILY, Disp: 30 tablet, Rfl: 4  •  escitalopram (LEXAPRO) 10 MG tablet, Take 1 tablet by mouth Daily. (Patient taking differently: Take 10 mg by mouth Daily As Needed.), Disp: 90 tablet, Rfl: 3  •  finasteride (PROSCAR) 5 MG tablet, Take 5 mg by mouth Daily., Disp: , Rfl:   •  fluticasone (FLONASE) 50 MCG/ACT nasal spray, 1 spray into each nostril Daily., Disp: 16 g, Rfl: 5  •  furosemide (LASIX) 20 MG tablet, Take 1 tablet by mouth Daily., Disp: 30 tablet, Rfl: 1  •  ipratropium-albuterol (DUO-NEB) 0.5-2.5 mg/mL nebulizer, Take 3 mL by nebulization Every 6 (Six) Hours., Disp: 360 mL, Rfl:   •  iron polysaccharides (NIFEREX) 150 MG  capsule, Take 150 mg by mouth 2 (Two) Times a Day., Disp: , Rfl:   •  levoFLOXacin (LEVAQUIN) 500 MG tablet, Take 1 tablet by mouth Daily., Disp: 7 tablet, Rfl: 0  •  metoprolol tartrate (LOPRESSOR) 25 MG tablet, Take 1 tablet by mouth Every 12 (Twelve) Hours., Disp: 180 tablet, Rfl: 3  •  nitroglycerin (NITROSTAT) 0.4 MG SL tablet, Place 0.4 mg under the tongue Every 5 (Five) Minutes As Needed., Disp: , Rfl:   •  O2 (OXYGEN), Inhale 4 L/min 1 (One) Time. Walking on 4L and sitting 3L, Disp: , Rfl:   •  oxybutynin (DITROPAN) 5 MG tablet, TAKE ONE TABLET BY MOUTH EVERY NIGHT AT BEDTIME, Disp: 30 tablet, Rfl: 1  •  predniSONE (DELTASONE) 10 MG tablet, TAKE ONE TABLET BY MOUTH DAILY, Disp: 30 tablet, Rfl: 1  •  predniSONE (DELTASONE) 20 MG tablet, 3 tabs daily for 5 days, Disp: 15 tablet, Rfl: 0  •  PROAIR  (90 BASE) MCG/ACT inhaler, Inhale 2 puffs Every 4 (Four) Hours As Needed for Wheezing or Shortness of Air., Disp: , Rfl:   •  spironolactone (ALDACTONE) 25 MG tablet, TAKE ONE TABLET BY MOUTH DAILY, Disp: 30 tablet, Rfl: 3  •  traMADol (ULTRAM) 50 MG tablet, Take 1 tablet by mouth Every 8 (Eight) Hours As Needed for Moderate Pain ., Disp: 60 tablet, Rfl: 0    The following portions of the patient's history were reviewed and updated as appropriate: allergies, current medications, past family history, past medical history, past social history, past surgical history and problem list.    Review of Systems   Constitutional: Negative.    Respiratory: Positive for shortness of breath.    Cardiovascular: Negative.    Gastrointestinal: Negative.    Musculoskeletal: Positive for neck pain.   Skin: Negative.    Neurological: Negative.    Psychiatric/Behavioral: Negative.        Objective   Physical Exam   Constitutional: He is oriented to person, place, and time. He appears well-nourished.   Neck: Neck supple.   Cardiovascular: Normal rate, regular rhythm and normal heart sounds.    Pulmonary/Chest: Effort normal and  breath sounds normal.   Abdominal: Bowel sounds are normal.   Musculoskeletal: He exhibits tenderness.   Neurological: He is alert and oriented to person, place, and time.   Skin: Skin is warm.   Psychiatric: He has a normal mood and affect.       All tests have been reviewed.    Assessment/Plan   There are no diagnoses linked to this encounter.          Lumbar radiculopathy pain improved     Dysphagia, unspecified type follow up with Gastroenterology     Neck pain still some, continue med prn     Fatigue reviewed labs      copd exac, improved after prednisone and zithromax      1 month with others

## 2018-09-11 PROBLEM — D64.9 ANEMIA: Status: ACTIVE | Noted: 2018-09-11

## 2018-09-11 PROBLEM — R12 HEARTBURN: Status: ACTIVE | Noted: 2018-09-11

## 2018-09-17 RX ORDER — FUROSEMIDE 20 MG/1
TABLET ORAL
Qty: 30 TABLET | Refills: 0 | Status: SHIPPED | OUTPATIENT
Start: 2018-09-17 | End: 2018-09-24 | Stop reason: SDUPTHER

## 2018-09-17 RX ORDER — TRAMADOL HYDROCHLORIDE 50 MG/1
TABLET ORAL
Qty: 60 TABLET | Refills: 0 | Status: SHIPPED | OUTPATIENT
Start: 2018-09-17 | End: 2018-10-17 | Stop reason: SDUPTHER

## 2018-09-22 ENCOUNTER — APPOINTMENT (OUTPATIENT)
Dept: CT IMAGING | Facility: HOSPITAL | Age: 68
End: 2018-09-22

## 2018-09-22 ENCOUNTER — APPOINTMENT (OUTPATIENT)
Dept: GENERAL RADIOLOGY | Facility: HOSPITAL | Age: 68
End: 2018-09-22

## 2018-09-22 ENCOUNTER — HOSPITAL ENCOUNTER (EMERGENCY)
Facility: HOSPITAL | Age: 68
Discharge: HOME | End: 2018-09-22
Attending: EMERGENCY MEDICINE | Admitting: STUDENT IN AN ORGANIZED HEALTH CARE EDUCATION/TRAINING PROGRAM

## 2018-09-22 VITALS
HEART RATE: 88 BPM | WEIGHT: 160 LBS | TEMPERATURE: 98.1 F | BODY MASS INDEX: 24.25 KG/M2 | SYSTOLIC BLOOD PRESSURE: 169 MMHG | RESPIRATION RATE: 18 BRPM | DIASTOLIC BLOOD PRESSURE: 87 MMHG | OXYGEN SATURATION: 99 % | HEIGHT: 68 IN

## 2018-09-22 DIAGNOSIS — S09.90XA TRAUMATIC INJURY OF HEAD, INITIAL ENCOUNTER: Primary | ICD-10-CM

## 2018-09-22 DIAGNOSIS — S20.212A RIB CONTUSION, LEFT, INITIAL ENCOUNTER: ICD-10-CM

## 2018-09-22 DIAGNOSIS — S00.81XA FACIAL ABRASION, INITIAL ENCOUNTER: ICD-10-CM

## 2018-09-22 LAB
ALBUMIN SERPL-MCNC: 4 G/DL (ref 3.5–5)
ALBUMIN/GLOB SERPL: 1.7 G/DL (ref 1–2)
ALP SERPL-CCNC: 80 U/L (ref 38–126)
ALT SERPL W P-5'-P-CCNC: 31 U/L (ref 13–69)
ANION GAP SERPL CALCULATED.3IONS-SCNC: 10.2 MMOL/L (ref 10–20)
AST SERPL-CCNC: 28 U/L (ref 15–46)
BASOPHILS # BLD AUTO: 0.03 10*3/MM3 (ref 0–0.2)
BASOPHILS NFR BLD AUTO: 0.4 % (ref 0–2.5)
BILIRUB SERPL-MCNC: 0.7 MG/DL (ref 0.2–1.3)
BUN BLD-MCNC: 47 MG/DL (ref 7–20)
BUN/CREAT SERPL: 27.6 (ref 6.3–21.9)
CALCIUM SPEC-SCNC: 9.1 MG/DL (ref 8.4–10.2)
CHLORIDE SERPL-SCNC: 101 MMOL/L (ref 98–107)
CO2 SERPL-SCNC: 35 MMOL/L (ref 26–30)
CREAT BLD-MCNC: 1.7 MG/DL (ref 0.6–1.3)
DEPRECATED RDW RBC AUTO: 44.2 FL (ref 37–54)
EOSINOPHIL # BLD AUTO: 0.2 10*3/MM3 (ref 0–0.7)
EOSINOPHIL NFR BLD AUTO: 2.9 % (ref 0–7)
ERYTHROCYTE [DISTWIDTH] IN BLOOD BY AUTOMATED COUNT: 12.4 % (ref 11.5–14.5)
GFR SERPL CREATININE-BSD FRML MDRD: 40 ML/MIN/1.73
GLOBULIN UR ELPH-MCNC: 2.3 GM/DL
GLUCOSE BLD-MCNC: 95 MG/DL (ref 74–98)
HCT VFR BLD AUTO: 36.7 % (ref 42–52)
HGB BLD-MCNC: 11.7 G/DL (ref 14–18)
IMM GRANULOCYTES # BLD: 0.01 10*3/MM3 (ref 0–0.06)
IMM GRANULOCYTES NFR BLD: 0.1 % (ref 0–0.6)
LYMPHOCYTES # BLD AUTO: 1.22 10*3/MM3 (ref 0.6–3.4)
LYMPHOCYTES NFR BLD AUTO: 17.7 % (ref 10–50)
MCH RBC QN AUTO: 31 PG (ref 27–31)
MCHC RBC AUTO-ENTMCNC: 31.9 G/DL (ref 30–37)
MCV RBC AUTO: 97.3 FL (ref 80–94)
MONOCYTES # BLD AUTO: 0.77 10*3/MM3 (ref 0–0.9)
MONOCYTES NFR BLD AUTO: 11.2 % (ref 0–12)
NEUTROPHILS # BLD AUTO: 4.66 10*3/MM3 (ref 2–6.9)
NEUTROPHILS NFR BLD AUTO: 67.7 % (ref 37–80)
NRBC BLD MANUAL-RTO: 0 /100 WBC (ref 0–0)
PLATELET # BLD AUTO: 161 10*3/MM3 (ref 130–400)
PMV BLD AUTO: 8.3 FL (ref 6–12)
POTASSIUM BLD-SCNC: 4.2 MMOL/L (ref 3.5–5.1)
PROT SERPL-MCNC: 6.3 G/DL (ref 6.3–8.2)
RBC # BLD AUTO: 3.77 10*6/MM3 (ref 4.7–6.1)
SODIUM BLD-SCNC: 142 MMOL/L (ref 137–145)
WBC NRBC COR # BLD: 6.89 10*3/MM3 (ref 4.8–10.8)

## 2018-09-22 PROCEDURE — 71101 X-RAY EXAM UNILAT RIBS/CHEST: CPT

## 2018-09-22 PROCEDURE — 85025 COMPLETE CBC W/AUTO DIFF WBC: CPT | Performed by: PHYSICIAN ASSISTANT

## 2018-09-22 PROCEDURE — 99283 EMERGENCY DEPT VISIT LOW MDM: CPT

## 2018-09-22 PROCEDURE — 70450 CT HEAD/BRAIN W/O DYE: CPT

## 2018-09-22 PROCEDURE — 80053 COMPREHEN METABOLIC PANEL: CPT | Performed by: PHYSICIAN ASSISTANT

## 2018-09-22 PROCEDURE — 93005 ELECTROCARDIOGRAM TRACING: CPT | Performed by: PHYSICIAN ASSISTANT

## 2018-09-22 PROCEDURE — 70486 CT MAXILLOFACIAL W/O DYE: CPT

## 2018-09-22 RX ORDER — HYDROCODONE BITARTRATE AND ACETAMINOPHEN 5; 325 MG/1; MG/1
1 TABLET ORAL ONCE
Status: COMPLETED | OUTPATIENT
Start: 2018-09-22 | End: 2018-09-22

## 2018-09-22 RX ADMIN — HYDROCODONE BITARTRATE AND ACETAMINOPHEN 1 TABLET: 5; 325 TABLET ORAL at 22:53

## 2018-09-23 NOTE — ED PROVIDER NOTES
"Subjective   This patient states he was going into the garage to open the door and felt a little lightheaded which caused him to fall forwards down 2 steps and struck his face on the concrete.  He denies loss conscious.  He denies chest pain.  He is on Plavix.  Decision mild headache however Tylenol has usually this.  This happened one hour prior to arrival.  He has an abrasion to his left forehead, \"his nose and swelling and small hematoma to the left lower lip.  He is edentulous and there is no evidence of injury to the gum.  He has no cervical vertebral tenderness.  He also states he has left anterior lower rib pain.  No shortness of breath.  He does have COPD and is oxygen dependent.            Review of Systems   HENT:        Abrasion oophorectomy, left nose, left lower lip.   Musculoskeletal:        Left lower anterior rib pain.     Neurological:        Mild lightheadedness prior to arrival that has since resolved   All other systems reviewed and are negative.      Past Medical History:   Diagnosis Date   • Abnormal liver enzymes    • Anemia    • Anxiety    • Arthritis    • Atherosclerotic heart disease of native coronary artery without angina pectoris    • Atrial fibrillation (CMS/HCC)    • Back pain    • BPH (benign prostatic hyperplasia)    • Cataract, bilateral    • Chest pain     2-3 MONTHS AGO.  PER PT, HAS ADDRESSED WITH CARDIOLOGIST.  STATES HAS NTG PRN.   • CHF (congestive heart failure) (CMS/HCC)    • Chronic bronchitis (CMS/HCC)    • Chronic kidney disease    • COPD (chronic obstructive pulmonary disease) (CMS/HCC)    • Degeneration of cervical intervertebral disc    • Depression    • Diverticulosis    • Dyspnea    • Esophageal reflux    • Esophagitis    • Gastritis    • Hematuria    • Hemorrhoids    • Hiatal hernia    • History of arthritis    • History of nuclear stress test     UNSURE OF DATE   • History of peripheral neuropathy    • History of ventricular septal defect    • History of " ventricular septal defect    • Hyperlipidemia    • Hypertension    • Infectious diarrhea    • Kyphosis, acquired    • Lumbar radiculopathy    • Mitral and aortic valve disease    • Nephrolithiasis    • Phimosis    • Problems with swallowing     WITH FOOD   • Respiratory failure (CMS/HCC)    • Sleep apnea     BIPAP   • TIA (transient ischemic attack)     X3.   2014   • Ventral hernia    • Wears glasses     FOR READING       Allergies   Allergen Reactions   • Mucomyst [Acetylcysteine] Other (See Comments)     bronchospasms   • Milk-Related Compounds Nausea And Vomiting   • Sulfa Antibiotics Nausea And Vomiting       Past Surgical History:   Procedure Laterality Date   • CARDIAC CATHETERIZATION     • CARDIAC CATHETERIZATION N/A 5/24/2018    Procedure: Left Heart Cath;  Surgeon: Edouard Robertson MD;  Location: [facility] CATH INVASIVE LOC[facility];  Service: Cardiovascular   • HERNIA REPAIR     • ORTHOPEDIC SURGERY Left     Left hip arthroplasty   • SUPRAPUBIC CATHETER INSERTION      History of Percutaneous Catheter Placement Into Ureter   • THROAT SURGERY      FOR SLEEP APNEA   • VSD REPAIR      History of VSD Repair By Patch Closure       Family History   Problem Relation Age of Onset   • Other Father         CABG   • Coronary artery disease Father        Social History     Social History   • Marital status:      Occupational History   •  Retired     Social History Main Topics   • Smoking status: Former Smoker     Packs/day: 3.00     Years: 30.00     Types: Cigarettes     Quit date: 2017   • Smokeless tobacco: Never Used   • Alcohol use No   • Drug use: No   • Sexual activity: Defer     Other Topics Concern   • Not on file           Objective   Physical Exam   Constitutional: He appears well-developed and well-nourished.   HENT:   Head: Head is without raccoon's eyes, without Mclain's sign and without laceration.       Nose: No nasal septal hematoma.   Neck: Normal range of motion.   No cervical vertebral tenderness    Pulmonary/Chest: Effort normal. He has wheezes.       Procedures           ED Course      2236 no obvious fracture or bleed noted on CT scan of the brain, CT did facial bones shows no fracture, rib series shows no acute abnormality.  Family states that they will stay with the patient tonight and observe him for any change in his presentation.            [facility]      Final diagnoses:   None            Dustin Aceves PA-C  09/22/18 2143       Dustin Aceves PA-C  09/22/18 2238

## 2018-09-23 NOTE — DISCHARGE INSTRUCTIONS
Please monitor Mr. Pena for any change in his mental status.  If this occurs or he develops a severe headache please have him reevaluated at the nearest emergency department.

## 2018-09-24 DIAGNOSIS — N32.81 OAB (OVERACTIVE BLADDER): ICD-10-CM

## 2018-09-24 RX ORDER — FUROSEMIDE 20 MG/1
TABLET ORAL
Qty: 30 TABLET | Refills: 0 | Status: ON HOLD | OUTPATIENT
Start: 2018-09-24 | End: 2019-01-24

## 2018-09-24 RX ORDER — FUROSEMIDE 20 MG/1
TABLET ORAL
Qty: 90 TABLET | Refills: 0 | Status: ON HOLD | OUTPATIENT
Start: 2018-09-24 | End: 2019-01-24

## 2018-09-24 RX ORDER — OXYBUTYNIN CHLORIDE 5 MG/1
TABLET ORAL
Qty: 30 TABLET | Refills: 0 | Status: SHIPPED | OUTPATIENT
Start: 2018-09-24 | End: 2018-11-12 | Stop reason: SDUPTHER

## 2018-09-24 RX ORDER — BACLOFEN 10 MG/1
TABLET ORAL
Qty: 90 TABLET | Refills: 0 | Status: SHIPPED | OUTPATIENT
Start: 2018-09-24 | End: 2018-12-24 | Stop reason: SDUPTHER

## 2018-09-24 RX ORDER — FUROSEMIDE 20 MG/1
TABLET ORAL
Qty: 30 TABLET | Refills: 0 | Status: SHIPPED | OUTPATIENT
Start: 2018-09-24 | End: 2019-06-24 | Stop reason: SDUPTHER

## 2018-09-24 RX ORDER — BACLOFEN 10 MG/1
TABLET ORAL
Qty: 90 TABLET | Refills: 0 | Status: SHIPPED | OUTPATIENT
Start: 2018-09-24 | End: 2019-03-08

## 2018-09-24 RX ORDER — BACLOFEN 10 MG/1
TABLET ORAL
Qty: 90 TABLET | Refills: 0 | Status: SHIPPED | OUTPATIENT
Start: 2018-09-24 | End: 2019-02-05 | Stop reason: HOSPADM

## 2018-10-17 RX ORDER — TRAMADOL HYDROCHLORIDE 50 MG/1
TABLET ORAL
Qty: 60 TABLET | Refills: 0 | Status: SHIPPED | OUTPATIENT
Start: 2018-10-17 | End: 2018-11-15 | Stop reason: SDUPTHER

## 2018-10-22 RX ORDER — ALFUZOSIN HYDROCHLORIDE 10 MG/1
TABLET, EXTENDED RELEASE ORAL
Qty: 30 TABLET | Refills: 2 | Status: SHIPPED | OUTPATIENT
Start: 2018-10-22 | End: 2019-02-25 | Stop reason: SDUPTHER

## 2018-10-22 RX ORDER — PREDNISONE 10 MG/1
TABLET ORAL
Qty: 30 TABLET | Refills: 0 | Status: SHIPPED | OUTPATIENT
Start: 2018-10-22 | End: 2018-11-19 | Stop reason: SDUPTHER

## 2018-10-23 RX ORDER — ATORVASTATIN CALCIUM 40 MG/1
TABLET, FILM COATED ORAL
Qty: 30 TABLET | Refills: 4 | Status: SHIPPED | OUTPATIENT
Start: 2018-10-23 | End: 2019-02-05 | Stop reason: HOSPADM

## 2018-11-12 DIAGNOSIS — N32.81 OAB (OVERACTIVE BLADDER): ICD-10-CM

## 2018-11-13 RX ORDER — OXYBUTYNIN CHLORIDE 5 MG/1
TABLET ORAL
Qty: 30 TABLET | Refills: 3 | Status: SHIPPED | OUTPATIENT
Start: 2018-11-13 | End: 2019-05-20 | Stop reason: SDUPTHER

## 2018-11-16 RX ORDER — TRAMADOL HYDROCHLORIDE 50 MG/1
TABLET ORAL
Qty: 60 TABLET | Refills: 0 | Status: SHIPPED | OUTPATIENT
Start: 2018-11-16 | End: 2019-01-13 | Stop reason: SDUPTHER

## 2018-11-19 RX ORDER — PREDNISONE 10 MG/1
TABLET ORAL
Qty: 30 TABLET | Refills: 0 | Status: SHIPPED | OUTPATIENT
Start: 2018-11-19 | End: 2018-12-24 | Stop reason: SDUPTHER

## 2018-12-07 ENCOUNTER — TRANSCRIBE ORDERS (OUTPATIENT)
Dept: ADMINISTRATIVE | Facility: HOSPITAL | Age: 68
End: 2018-12-07

## 2018-12-07 ENCOUNTER — LAB (OUTPATIENT)
Dept: LAB | Facility: HOSPITAL | Age: 68
End: 2018-12-07
Attending: INTERNAL MEDICINE

## 2018-12-07 DIAGNOSIS — R09.02 HYPOXEMIA: Primary | ICD-10-CM

## 2018-12-07 DIAGNOSIS — R09.02 HYPOXEMIA: ICD-10-CM

## 2018-12-07 DIAGNOSIS — J44.1 OBSTRUCTIVE CHRONIC BRONCHITIS WITH EXACERBATION (HCC): ICD-10-CM

## 2018-12-07 LAB
A-A DO2: ABNORMAL MMHG
ARTERIAL PATENCY WRIST A: POSITIVE
ATMOSPHERIC PRESS: 744 MMHG
BASE EXCESS BLDA CALC-SCNC: 7 MMOL/L (ref 0–2)
BDY SITE: ABNORMAL
COHGB MFR BLD: 0.9 % (ref 0–2)
GAS FLOW AIRWAY: 3 LPM
HCO3 BLDA-SCNC: 32 MMOL/L (ref 22–28)
HCT VFR BLD CALC: 41.1 %
HGB BLDA-MCNC: 13.4 G/DL (ref 12–18)
HOROWITZ INDEX BLD+IHG-RTO: 32 %
METHGB BLD QL: 0.8 % (ref 0–1.5)
MODALITY: ABNORMAL
NOTE: ABNORMAL
OXYHGB MFR BLDV: 90.3 % (ref 94–99)
PCO2 BLDA: 45.7 MM HG (ref 35–45)
PCO2 TEMP ADJ BLD: ABNORMAL MM HG (ref 35–48)
PH BLDA: 7.45 PH UNITS (ref 7.3–7.5)
PH, TEMP CORRECTED: ABNORMAL PH UNITS
PO2 BLDA: 57.4 MM HG (ref 75–100)
PO2 TEMP ADJ BLD: ABNORMAL MM HG (ref 83–108)
SAO2 % BLDCOA: 91.9 % (ref 94–100)
VENTILATOR MODE: ABNORMAL

## 2018-12-07 PROCEDURE — 82375 ASSAY CARBOXYHB QUANT: CPT

## 2018-12-07 PROCEDURE — 82805 BLOOD GASES W/O2 SATURATION: CPT

## 2018-12-07 PROCEDURE — 36600 WITHDRAWAL OF ARTERIAL BLOOD: CPT

## 2018-12-07 PROCEDURE — 83050 HGB METHEMOGLOBIN QUAN: CPT

## 2018-12-17 RX ORDER — ALPRAZOLAM 0.5 MG/1
TABLET ORAL
Qty: 30 TABLET | Refills: 2 | Status: SHIPPED | OUTPATIENT
Start: 2018-12-17 | End: 2019-02-05 | Stop reason: HOSPADM

## 2018-12-24 ENCOUNTER — HOSPITAL ENCOUNTER (EMERGENCY)
Facility: HOSPITAL | Age: 68
Discharge: HOME | End: 2018-12-24
Attending: EMERGENCY MEDICINE | Admitting: EMERGENCY MEDICINE

## 2018-12-24 ENCOUNTER — APPOINTMENT (OUTPATIENT)
Dept: CT IMAGING | Facility: HOSPITAL | Age: 68
End: 2018-12-24

## 2018-12-24 VITALS
BODY MASS INDEX: 25.7 KG/M2 | TEMPERATURE: 98.3 F | SYSTOLIC BLOOD PRESSURE: 148 MMHG | RESPIRATION RATE: 20 BRPM | OXYGEN SATURATION: 98 % | WEIGHT: 169.6 LBS | DIASTOLIC BLOOD PRESSURE: 63 MMHG | HEIGHT: 68 IN | HEART RATE: 62 BPM

## 2018-12-24 DIAGNOSIS — R55 SYNCOPE AND COLLAPSE: Primary | ICD-10-CM

## 2018-12-24 LAB
ALBUMIN SERPL-MCNC: 4.2 G/DL (ref 3.5–5)
ALBUMIN/GLOB SERPL: 1.8 G/DL (ref 1–2)
ALP SERPL-CCNC: 64 U/L (ref 38–126)
ALT SERPL W P-5'-P-CCNC: 27 U/L (ref 13–69)
ANION GAP SERPL CALCULATED.3IONS-SCNC: 7.8 MMOL/L (ref 10–20)
AST SERPL-CCNC: 32 U/L (ref 15–46)
BASOPHILS # BLD AUTO: 0.04 10*3/MM3 (ref 0–0.2)
BASOPHILS NFR BLD AUTO: 0.5 % (ref 0–2.5)
BILIRUB SERPL-MCNC: 1.4 MG/DL (ref 0.2–1.3)
BUN BLD-MCNC: 34 MG/DL (ref 7–20)
BUN/CREAT SERPL: 24.3 (ref 6.3–21.9)
CALCIUM SPEC-SCNC: 9.5 MG/DL (ref 8.4–10.2)
CHLORIDE SERPL-SCNC: 96 MMOL/L (ref 98–107)
CO2 SERPL-SCNC: 38 MMOL/L (ref 26–30)
CREAT BLD-MCNC: 1.4 MG/DL (ref 0.6–1.3)
DEPRECATED RDW RBC AUTO: 43.3 FL (ref 37–54)
EOSINOPHIL # BLD AUTO: 0.19 10*3/MM3 (ref 0–0.7)
EOSINOPHIL NFR BLD AUTO: 2.2 % (ref 0–7)
ERYTHROCYTE [DISTWIDTH] IN BLOOD BY AUTOMATED COUNT: 12.1 % (ref 11.5–14.5)
GFR SERPL CREATININE-BSD FRML MDRD: 50 ML/MIN/1.73
GLOBULIN UR ELPH-MCNC: 2.4 GM/DL
GLUCOSE BLD-MCNC: 85 MG/DL (ref 74–98)
HCT VFR BLD AUTO: 37.1 % (ref 42–52)
HGB BLD-MCNC: 11.7 G/DL (ref 14–18)
IMM GRANULOCYTES # BLD AUTO: 0.04 10*3/MM3 (ref 0–0.06)
IMM GRANULOCYTES NFR BLD AUTO: 0.5 % (ref 0–0.6)
LYMPHOCYTES # BLD AUTO: 1.42 10*3/MM3 (ref 0.6–3.4)
LYMPHOCYTES NFR BLD AUTO: 16.7 % (ref 10–50)
MCH RBC QN AUTO: 31 PG (ref 27–31)
MCHC RBC AUTO-ENTMCNC: 31.5 G/DL (ref 30–37)
MCV RBC AUTO: 98.1 FL (ref 80–94)
MONOCYTES # BLD AUTO: 0.75 10*3/MM3 (ref 0–0.9)
MONOCYTES NFR BLD AUTO: 8.8 % (ref 0–12)
NEUTROPHILS # BLD AUTO: 6.05 10*3/MM3 (ref 2–6.9)
NEUTROPHILS NFR BLD AUTO: 71.3 % (ref 37–80)
NRBC BLD AUTO-RTO: 0 /100 WBC (ref 0–0)
PLATELET # BLD AUTO: 186 10*3/MM3 (ref 130–400)
PMV BLD AUTO: 8.4 FL (ref 6–12)
POTASSIUM BLD-SCNC: 3.8 MMOL/L (ref 3.5–5.1)
PROT SERPL-MCNC: 6.6 G/DL (ref 6.3–8.2)
RBC # BLD AUTO: 3.78 10*6/MM3 (ref 4.7–6.1)
SODIUM BLD-SCNC: 138 MMOL/L (ref 137–145)
TROPONIN I SERPL-MCNC: 0.05 NG/ML (ref 0–0.03)
WBC NRBC COR # BLD: 8.49 10*3/MM3 (ref 4.8–10.8)

## 2018-12-24 PROCEDURE — 80053 COMPREHEN METABOLIC PANEL: CPT | Performed by: EMERGENCY MEDICINE

## 2018-12-24 PROCEDURE — 93005 ELECTROCARDIOGRAM TRACING: CPT | Performed by: EMERGENCY MEDICINE

## 2018-12-24 PROCEDURE — 85025 COMPLETE CBC W/AUTO DIFF WBC: CPT | Performed by: EMERGENCY MEDICINE

## 2018-12-24 PROCEDURE — 72125 CT NECK SPINE W/O DYE: CPT

## 2018-12-24 PROCEDURE — 99284 EMERGENCY DEPT VISIT MOD MDM: CPT

## 2018-12-24 PROCEDURE — 84484 ASSAY OF TROPONIN QUANT: CPT | Performed by: EMERGENCY MEDICINE

## 2018-12-24 PROCEDURE — 70450 CT HEAD/BRAIN W/O DYE: CPT

## 2018-12-24 RX ORDER — PREDNISONE 10 MG/1
TABLET ORAL
Qty: 30 TABLET | Refills: 0 | Status: SHIPPED | OUTPATIENT
Start: 2018-12-24 | End: 2019-02-05 | Stop reason: HOSPADM

## 2018-12-24 RX ORDER — BACLOFEN 10 MG/1
TABLET ORAL
Qty: 90 TABLET | Refills: 0 | Status: ON HOLD | OUTPATIENT
Start: 2018-12-24 | End: 2019-01-24

## 2018-12-24 RX ORDER — SODIUM CHLORIDE 0.9 % (FLUSH) 0.9 %
10 SYRINGE (ML) INJECTION AS NEEDED
Status: DISCONTINUED | OUTPATIENT
Start: 2018-12-24 | End: 2018-12-24 | Stop reason: HOSPADM

## 2018-12-24 RX ORDER — MIRTAZAPINE 15 MG/1
15 TABLET, FILM COATED ORAL NIGHTLY
COMMUNITY
End: 2019-03-08

## 2018-12-24 RX ORDER — ACETAMINOPHEN 325 MG/1
650 TABLET ORAL ONCE
Status: COMPLETED | OUTPATIENT
Start: 2018-12-24 | End: 2018-12-24

## 2018-12-24 RX ADMIN — ACETAMINOPHEN 650 MG: 325 TABLET, FILM COATED ORAL at 19:21

## 2019-01-14 RX ORDER — TRAMADOL HYDROCHLORIDE 50 MG/1
TABLET ORAL
Qty: 60 TABLET | Refills: 0 | Status: SHIPPED | OUTPATIENT
Start: 2019-01-14 | End: 2019-02-05 | Stop reason: HOSPADM

## 2019-01-20 ENCOUNTER — APPOINTMENT (OUTPATIENT)
Dept: GENERAL RADIOLOGY | Facility: HOSPITAL | Age: 69
End: 2019-01-20

## 2019-01-20 ENCOUNTER — HOSPITAL ENCOUNTER (OUTPATIENT)
Facility: HOSPITAL | Age: 69
Setting detail: OBSERVATION
Discharge: ANOTHER HOSPITAL | End: 2019-01-22
Attending: EMERGENCY MEDICINE | Admitting: EMERGENCY MEDICINE

## 2019-01-20 ENCOUNTER — APPOINTMENT (OUTPATIENT)
Dept: CT IMAGING | Facility: HOSPITAL | Age: 69
End: 2019-01-20

## 2019-01-20 DIAGNOSIS — Z78.9 IMPAIRED MOBILITY AND ADLS: ICD-10-CM

## 2019-01-20 DIAGNOSIS — R55 SYNCOPE AND COLLAPSE: Primary | ICD-10-CM

## 2019-01-20 DIAGNOSIS — R77.8 ELEVATED TROPONIN: ICD-10-CM

## 2019-01-20 DIAGNOSIS — Z74.09 IMPAIRED MOBILITY AND ADLS: ICD-10-CM

## 2019-01-20 LAB
A-A DO2: ABNORMAL MMHG
ALBUMIN SERPL-MCNC: 4.6 G/DL (ref 3.5–5)
ALBUMIN/GLOB SERPL: 1.8 G/DL (ref 1–2)
ALP SERPL-CCNC: 68 U/L (ref 38–126)
ALT SERPL W P-5'-P-CCNC: 25 U/L (ref 13–69)
ANION GAP SERPL CALCULATED.3IONS-SCNC: 10.8 MMOL/L (ref 10–20)
ARTERIAL PATENCY WRIST A: POSITIVE
AST SERPL-CCNC: 45 U/L (ref 15–46)
ATMOSPHERIC PRESS: 741 MMHG
BASE EXCESS BLDA CALC-SCNC: 10.9 MMOL/L (ref 0–2)
BASOPHILS # BLD AUTO: 0.03 10*3/MM3 (ref 0–0.2)
BASOPHILS NFR BLD AUTO: 0.4 % (ref 0–2.5)
BDY SITE: ABNORMAL
BILIRUB SERPL-MCNC: 1.5 MG/DL (ref 0.2–1.3)
BUN BLD-MCNC: 50 MG/DL (ref 7–20)
BUN/CREAT SERPL: 35.7 (ref 6.3–21.9)
CALCIUM SPEC-SCNC: 9.6 MG/DL (ref 8.4–10.2)
CHLORIDE SERPL-SCNC: 99 MMOL/L (ref 98–107)
CO2 SERPL-SCNC: 34 MMOL/L (ref 26–30)
COHGB MFR BLD: 0.7 % (ref 0–2)
CREAT BLD-MCNC: 1.4 MG/DL (ref 0.6–1.3)
DEPRECATED RDW RBC AUTO: 43.9 FL (ref 37–54)
EOSINOPHIL # BLD AUTO: 0.13 10*3/MM3 (ref 0–0.7)
EOSINOPHIL NFR BLD AUTO: 1.6 % (ref 0–7)
ERYTHROCYTE [DISTWIDTH] IN BLOOD BY AUTOMATED COUNT: 12 % (ref 11.5–14.5)
GAS FLOW AIRWAY: 4 LPM
GFR SERPL CREATININE-BSD FRML MDRD: 50 ML/MIN/1.73
GLOBULIN UR ELPH-MCNC: 2.6 GM/DL
GLUCOSE BLD-MCNC: 101 MG/DL (ref 74–98)
HCO3 BLDA-SCNC: 37.7 MMOL/L (ref 22–28)
HCT VFR BLD AUTO: 36.2 % (ref 42–52)
HCT VFR BLD CALC: 35.1 %
HGB BLD-MCNC: 11.8 G/DL (ref 14–18)
HGB BLDA-MCNC: 11.4 G/DL (ref 12–18)
HOLD SPECIMEN: NORMAL
HOLD SPECIMEN: NORMAL
HOROWITZ INDEX BLD+IHG-RTO: 36 %
IMM GRANULOCYTES # BLD AUTO: 0.03 10*3/MM3 (ref 0–0.06)
IMM GRANULOCYTES NFR BLD AUTO: 0.4 % (ref 0–0.6)
LYMPHOCYTES # BLD AUTO: 1.31 10*3/MM3 (ref 0.6–3.4)
LYMPHOCYTES NFR BLD AUTO: 16.6 % (ref 10–50)
MAGNESIUM SERPL-MCNC: 2.1 MG/DL (ref 1.6–2.3)
MCH RBC QN AUTO: 32.2 PG (ref 27–31)
MCHC RBC AUTO-ENTMCNC: 32.6 G/DL (ref 30–37)
MCV RBC AUTO: 98.9 FL (ref 80–94)
METHGB BLD QL: 0.9 % (ref 0–1.5)
MODALITY: ABNORMAL
MONOCYTES # BLD AUTO: 0.69 10*3/MM3 (ref 0–0.9)
MONOCYTES NFR BLD AUTO: 8.7 % (ref 0–12)
NEUTROPHILS # BLD AUTO: 5.72 10*3/MM3 (ref 2–6.9)
NEUTROPHILS NFR BLD AUTO: 72.3 % (ref 37–80)
NOTE: ABNORMAL
NRBC BLD AUTO-RTO: 0 /100 WBC (ref 0–0)
OXYHGB MFR BLDV: 97.6 % (ref 94–99)
PCO2 BLDA: 60.6 MM HG (ref 35–45)
PCO2 TEMP ADJ BLD: ABNORMAL MM HG (ref 35–48)
PH BLDA: 7.4 PH UNITS (ref 7.3–7.5)
PH, TEMP CORRECTED: ABNORMAL PH UNITS
PLATELET # BLD AUTO: 178 10*3/MM3 (ref 130–400)
PMV BLD AUTO: 8.7 FL (ref 6–12)
PO2 BLDA: 123 MM HG (ref 75–100)
PO2 TEMP ADJ BLD: ABNORMAL MM HG (ref 83–108)
POTASSIUM BLD-SCNC: 4.8 MMOL/L (ref 3.5–5.1)
PROT SERPL-MCNC: 7.2 G/DL (ref 6.3–8.2)
RBC # BLD AUTO: 3.66 10*6/MM3 (ref 4.7–6.1)
SAO2 % BLDCOA: 99.2 % (ref 94–100)
SODIUM BLD-SCNC: 139 MMOL/L (ref 137–145)
TROPONIN I SERPL-MCNC: 0.03 NG/ML (ref 0–0.03)
TROPONIN I SERPL-MCNC: 0.04 NG/ML (ref 0–0.03)
TROPONIN I SERPL-MCNC: 0.05 NG/ML (ref 0–0.03)
VENTILATOR MODE: ABNORMAL
WBC NRBC COR # BLD: 7.91 10*3/MM3 (ref 4.8–10.8)
WHOLE BLOOD HOLD SPECIMEN: NORMAL
WHOLE BLOOD HOLD SPECIMEN: NORMAL

## 2019-01-20 PROCEDURE — 82805 BLOOD GASES W/O2 SATURATION: CPT

## 2019-01-20 PROCEDURE — 83735 ASSAY OF MAGNESIUM: CPT | Performed by: EMERGENCY MEDICINE

## 2019-01-20 PROCEDURE — 93005 ELECTROCARDIOGRAM TRACING: CPT | Performed by: EMERGENCY MEDICINE

## 2019-01-20 PROCEDURE — 73562 X-RAY EXAM OF KNEE 3: CPT

## 2019-01-20 PROCEDURE — 84484 ASSAY OF TROPONIN QUANT: CPT | Performed by: EMERGENCY MEDICINE

## 2019-01-20 PROCEDURE — 71045 X-RAY EXAM CHEST 1 VIEW: CPT

## 2019-01-20 PROCEDURE — 83050 HGB METHEMOGLOBIN QUAN: CPT

## 2019-01-20 PROCEDURE — 80053 COMPREHEN METABOLIC PANEL: CPT | Performed by: EMERGENCY MEDICINE

## 2019-01-20 PROCEDURE — 85025 COMPLETE CBC W/AUTO DIFF WBC: CPT | Performed by: EMERGENCY MEDICINE

## 2019-01-20 PROCEDURE — 36600 WITHDRAWAL OF ARTERIAL BLOOD: CPT

## 2019-01-20 PROCEDURE — 72125 CT NECK SPINE W/O DYE: CPT

## 2019-01-20 PROCEDURE — 94799 UNLISTED PULMONARY SVC/PX: CPT

## 2019-01-20 PROCEDURE — 82375 ASSAY CARBOXYHB QUANT: CPT

## 2019-01-20 PROCEDURE — 25010000002 ONDANSETRON PER 1 MG: Performed by: PHYSICIAN ASSISTANT

## 2019-01-20 PROCEDURE — 70450 CT HEAD/BRAIN W/O DYE: CPT

## 2019-01-20 PROCEDURE — 99285 EMERGENCY DEPT VISIT HI MDM: CPT

## 2019-01-20 PROCEDURE — 84484 ASSAY OF TROPONIN QUANT: CPT | Performed by: PHYSICIAN ASSISTANT

## 2019-01-20 PROCEDURE — 96374 THER/PROPH/DIAG INJ IV PUSH: CPT

## 2019-01-20 RX ORDER — SODIUM CHLORIDE 0.9 % (FLUSH) 0.9 %
10 SYRINGE (ML) INJECTION AS NEEDED
Status: DISCONTINUED | OUTPATIENT
Start: 2019-01-20 | End: 2019-01-22 | Stop reason: HOSPADM

## 2019-01-20 RX ORDER — LIDOCAINE HYDROCHLORIDE AND EPINEPHRINE 10; 10 MG/ML; UG/ML
10 INJECTION, SOLUTION INFILTRATION; PERINEURAL ONCE
Status: COMPLETED | OUTPATIENT
Start: 2019-01-20 | End: 2019-01-20

## 2019-01-20 RX ORDER — ONDANSETRON 2 MG/ML
4 INJECTION INTRAMUSCULAR; INTRAVENOUS ONCE
Status: COMPLETED | OUTPATIENT
Start: 2019-01-20 | End: 2019-01-20

## 2019-01-20 RX ORDER — ACETAMINOPHEN 325 MG/1
975 TABLET ORAL ONCE
Status: COMPLETED | OUTPATIENT
Start: 2019-01-20 | End: 2019-01-20

## 2019-01-20 RX ADMIN — ONDANSETRON 4 MG: 2 INJECTION INTRAMUSCULAR; INTRAVENOUS at 20:37

## 2019-01-20 RX ADMIN — LIDOCAINE HYDROCHLORIDE,EPINEPHRINE BITARTRATE 10 ML: 10; .01 INJECTION, SOLUTION INFILTRATION; PERINEURAL at 21:59

## 2019-01-20 RX ADMIN — ACETAMINOPHEN 975 MG: 325 TABLET ORAL at 18:45

## 2019-01-21 ENCOUNTER — APPOINTMENT (OUTPATIENT)
Dept: CARDIOLOGY | Facility: HOSPITAL | Age: 69
End: 2019-01-21
Attending: INTERNAL MEDICINE

## 2019-01-21 ENCOUNTER — APPOINTMENT (OUTPATIENT)
Dept: CT IMAGING | Facility: HOSPITAL | Age: 69
End: 2019-01-21

## 2019-01-21 PROBLEM — R55 SYNCOPE AND COLLAPSE: Status: ACTIVE | Noted: 2019-01-21

## 2019-01-21 LAB
ALBUMIN SERPL-MCNC: 3.9 G/DL (ref 3.5–5)
ALBUMIN/GLOB SERPL: 1.8 G/DL (ref 1–2)
ALP SERPL-CCNC: 58 U/L (ref 38–126)
ALT SERPL W P-5'-P-CCNC: 27 U/L (ref 13–69)
ANION GAP SERPL CALCULATED.3IONS-SCNC: 6.9 MMOL/L (ref 10–20)
AST SERPL-CCNC: 37 U/L (ref 15–46)
BILIRUB SERPL-MCNC: 1.3 MG/DL (ref 0.2–1.3)
BUN BLD-MCNC: 44 MG/DL (ref 7–20)
BUN/CREAT SERPL: 31.4 (ref 6.3–21.9)
CALCIUM SPEC-SCNC: 9.1 MG/DL (ref 8.4–10.2)
CHLORIDE SERPL-SCNC: 98 MMOL/L (ref 98–107)
CHOLEST SERPL-MCNC: 168 MG/DL (ref 0–199)
CO2 SERPL-SCNC: 38 MMOL/L (ref 26–30)
CREAT BLD-MCNC: 1.4 MG/DL (ref 0.6–1.3)
DEPRECATED RDW RBC AUTO: 44.5 FL (ref 37–54)
ERYTHROCYTE [DISTWIDTH] IN BLOOD BY AUTOMATED COUNT: 12.1 % (ref 11.5–14.5)
GFR SERPL CREATININE-BSD FRML MDRD: 50 ML/MIN/1.73
GLOBULIN UR ELPH-MCNC: 2.2 GM/DL
GLUCOSE BLD-MCNC: 100 MG/DL (ref 74–98)
HBA1C MFR BLD: 5.4 % (ref 3–6)
HCT VFR BLD AUTO: 33.4 % (ref 42–52)
HDLC SERPL-MCNC: 49 MG/DL (ref 40–60)
HGB BLD-MCNC: 10.5 G/DL (ref 14–18)
LDLC SERPL CALC-MCNC: 89 MG/DL (ref 0–99)
LDLC/HDLC SERPL: 1.81 {RATIO}
MAGNESIUM SERPL-MCNC: 2.1 MG/DL (ref 1.6–2.3)
MAXIMAL PREDICTED HEART RATE: 152 BPM
MCH RBC QN AUTO: 31.6 PG (ref 27–31)
MCHC RBC AUTO-ENTMCNC: 31.4 G/DL (ref 30–37)
MCV RBC AUTO: 100.6 FL (ref 80–94)
PHOSPHATE SERPL-MCNC: 3.8 MG/DL (ref 2.5–4.5)
PLATELET # BLD AUTO: 170 10*3/MM3 (ref 130–400)
PMV BLD AUTO: 8.8 FL (ref 6–12)
POTASSIUM BLD-SCNC: 3.9 MMOL/L (ref 3.5–5.1)
PROT SERPL-MCNC: 6.1 G/DL (ref 6.3–8.2)
RBC # BLD AUTO: 3.32 10*6/MM3 (ref 4.7–6.1)
SODIUM BLD-SCNC: 139 MMOL/L (ref 137–145)
STRESS TARGET HR: 129 BPM
TRIGL SERPL-MCNC: 152 MG/DL
TROPONIN I SERPL-MCNC: 0.06 NG/ML (ref 0–0.03)
TSH SERPL DL<=0.05 MIU/L-ACNC: 1.46 MIU/ML (ref 0.47–4.68)
VLDLC SERPL-MCNC: 30.4 MG/DL
WBC NRBC COR # BLD: 8.93 10*3/MM3 (ref 4.8–10.8)

## 2019-01-21 PROCEDURE — G0378 HOSPITAL OBSERVATION PER HR: HCPCS

## 2019-01-21 PROCEDURE — 93306 TTE W/DOPPLER COMPLETE: CPT

## 2019-01-21 PROCEDURE — 25010000002 MORPHINE PER 10 MG: Performed by: INTERNAL MEDICINE

## 2019-01-21 PROCEDURE — 80053 COMPREHEN METABOLIC PANEL: CPT | Performed by: INTERNAL MEDICINE

## 2019-01-21 PROCEDURE — 96376 TX/PRO/DX INJ SAME DRUG ADON: CPT

## 2019-01-21 PROCEDURE — 94799 UNLISTED PULMONARY SVC/PX: CPT

## 2019-01-21 PROCEDURE — 70450 CT HEAD/BRAIN W/O DYE: CPT

## 2019-01-21 PROCEDURE — 83735 ASSAY OF MAGNESIUM: CPT | Performed by: INTERNAL MEDICINE

## 2019-01-21 PROCEDURE — 94640 AIRWAY INHALATION TREATMENT: CPT

## 2019-01-21 PROCEDURE — 85027 COMPLETE CBC AUTOMATED: CPT | Performed by: INTERNAL MEDICINE

## 2019-01-21 PROCEDURE — 84443 ASSAY THYROID STIM HORMONE: CPT | Performed by: INTERNAL MEDICINE

## 2019-01-21 PROCEDURE — 99225 PR SBSQ OBSERVATION CARE/DAY 25 MINUTES: CPT | Performed by: NURSE PRACTITIONER

## 2019-01-21 PROCEDURE — 99219 PR INITIAL OBSERVATION CARE/DAY 50 MINUTES: CPT | Performed by: INTERNAL MEDICINE

## 2019-01-21 PROCEDURE — 84484 ASSAY OF TROPONIN QUANT: CPT | Performed by: INTERNAL MEDICINE

## 2019-01-21 PROCEDURE — 84100 ASSAY OF PHOSPHORUS: CPT | Performed by: INTERNAL MEDICINE

## 2019-01-21 PROCEDURE — 80061 LIPID PANEL: CPT | Performed by: INTERNAL MEDICINE

## 2019-01-21 PROCEDURE — 25010000002 ONDANSETRON PER 1 MG: Performed by: INTERNAL MEDICINE

## 2019-01-21 PROCEDURE — 96375 TX/PRO/DX INJ NEW DRUG ADDON: CPT

## 2019-01-21 PROCEDURE — 83036 HEMOGLOBIN GLYCOSYLATED A1C: CPT | Performed by: INTERNAL MEDICINE

## 2019-01-21 RX ORDER — ASPIRIN 81 MG/1
81 TABLET ORAL DAILY
Status: DISCONTINUED | OUTPATIENT
Start: 2019-01-21 | End: 2019-01-22 | Stop reason: HOSPADM

## 2019-01-21 RX ORDER — TOBRAMYCIN 3 MG/ML
2 SOLUTION/ DROPS OPHTHALMIC 4 TIMES DAILY
Status: DISCONTINUED | OUTPATIENT
Start: 2019-01-21 | End: 2019-01-22 | Stop reason: HOSPADM

## 2019-01-21 RX ORDER — TAMSULOSIN HYDROCHLORIDE 0.4 MG/1
0.4 CAPSULE ORAL NIGHTLY
Status: DISCONTINUED | OUTPATIENT
Start: 2019-01-21 | End: 2019-01-22 | Stop reason: HOSPADM

## 2019-01-21 RX ORDER — ALFUZOSIN HYDROCHLORIDE 10 MG/1
10 TABLET, EXTENDED RELEASE ORAL DAILY
Status: ON HOLD | COMMUNITY
End: 2019-01-21 | Stop reason: SDUPTHER

## 2019-01-21 RX ORDER — ONDANSETRON 4 MG/1
4 TABLET, FILM COATED ORAL EVERY 6 HOURS PRN
Status: DISCONTINUED | OUTPATIENT
Start: 2019-01-21 | End: 2019-01-22 | Stop reason: HOSPADM

## 2019-01-21 RX ORDER — HYDROCODONE BITARTRATE AND ACETAMINOPHEN 5; 325 MG/1; MG/1
1 TABLET ORAL ONCE
Status: COMPLETED | OUTPATIENT
Start: 2019-01-21 | End: 2019-01-21

## 2019-01-21 RX ORDER — BUDESONIDE AND FORMOTEROL FUMARATE DIHYDRATE 80; 4.5 UG/1; UG/1
2 AEROSOL RESPIRATORY (INHALATION)
Status: DISCONTINUED | OUTPATIENT
Start: 2019-01-21 | End: 2019-01-22 | Stop reason: HOSPADM

## 2019-01-21 RX ORDER — ALBUTEROL SULFATE 2.5 MG/3ML
2.5 SOLUTION RESPIRATORY (INHALATION) EVERY 4 HOURS PRN
Status: DISCONTINUED | OUTPATIENT
Start: 2019-01-21 | End: 2019-01-22 | Stop reason: HOSPADM

## 2019-01-21 RX ORDER — MORPHINE SULFATE 2 MG/ML
2 INJECTION, SOLUTION INTRAMUSCULAR; INTRAVENOUS EVERY 4 HOURS PRN
Status: DISCONTINUED | OUTPATIENT
Start: 2019-01-21 | End: 2019-01-22 | Stop reason: HOSPADM

## 2019-01-21 RX ORDER — IPRATROPIUM BROMIDE AND ALBUTEROL SULFATE 2.5; .5 MG/3ML; MG/3ML
3 SOLUTION RESPIRATORY (INHALATION)
Status: DISCONTINUED | OUTPATIENT
Start: 2019-01-21 | End: 2019-01-22 | Stop reason: HOSPADM

## 2019-01-21 RX ORDER — SODIUM CHLORIDE 0.9 % (FLUSH) 0.9 %
1-10 SYRINGE (ML) INJECTION AS NEEDED
Status: DISCONTINUED | OUTPATIENT
Start: 2019-01-21 | End: 2019-01-22 | Stop reason: HOSPADM

## 2019-01-21 RX ORDER — DOCUSATE SODIUM 100 MG/1
100 CAPSULE, LIQUID FILLED ORAL 2 TIMES DAILY
Status: DISCONTINUED | OUTPATIENT
Start: 2019-01-21 | End: 2019-01-22 | Stop reason: HOSPADM

## 2019-01-21 RX ORDER — MULTIPLE VITAMINS W/ MINERALS TAB 9MG-400MCG
1 TAB ORAL DAILY
COMMUNITY
End: 2019-03-08

## 2019-01-21 RX ORDER — ATORVASTATIN CALCIUM 40 MG/1
40 TABLET, FILM COATED ORAL DAILY
Status: DISCONTINUED | OUTPATIENT
Start: 2019-01-21 | End: 2019-01-22 | Stop reason: HOSPADM

## 2019-01-21 RX ORDER — NALOXONE HCL 0.4 MG/ML
0.4 VIAL (ML) INJECTION
Status: DISCONTINUED | OUTPATIENT
Start: 2019-01-21 | End: 2019-01-22 | Stop reason: HOSPADM

## 2019-01-21 RX ORDER — HYDROCODONE BITARTRATE AND ACETAMINOPHEN 5; 325 MG/1; MG/1
1 TABLET ORAL EVERY 4 HOURS PRN
Status: DISCONTINUED | OUTPATIENT
Start: 2019-01-21 | End: 2019-01-22 | Stop reason: HOSPADM

## 2019-01-21 RX ORDER — ESCITALOPRAM OXALATE 10 MG/1
10 TABLET ORAL DAILY
Status: DISCONTINUED | OUTPATIENT
Start: 2019-01-21 | End: 2019-01-22 | Stop reason: HOSPADM

## 2019-01-21 RX ORDER — ALPRAZOLAM 0.5 MG/1
0.5 TABLET ORAL NIGHTLY PRN
Status: DISCONTINUED | OUTPATIENT
Start: 2019-01-21 | End: 2019-01-22 | Stop reason: HOSPADM

## 2019-01-21 RX ORDER — ACETAMINOPHEN 325 MG/1
650 TABLET ORAL EVERY 4 HOURS PRN
Status: DISCONTINUED | OUTPATIENT
Start: 2019-01-21 | End: 2019-01-22 | Stop reason: HOSPADM

## 2019-01-21 RX ORDER — ONDANSETRON 2 MG/ML
4 INJECTION INTRAMUSCULAR; INTRAVENOUS EVERY 6 HOURS PRN
Status: DISCONTINUED | OUTPATIENT
Start: 2019-01-21 | End: 2019-01-22 | Stop reason: HOSPADM

## 2019-01-21 RX ORDER — ONDANSETRON 4 MG/1
4 TABLET, ORALLY DISINTEGRATING ORAL EVERY 6 HOURS PRN
Status: DISCONTINUED | OUTPATIENT
Start: 2019-01-21 | End: 2019-01-22 | Stop reason: HOSPADM

## 2019-01-21 RX ORDER — SODIUM CHLORIDE 0.9 % (FLUSH) 0.9 %
3 SYRINGE (ML) INJECTION EVERY 12 HOURS SCHEDULED
Status: DISCONTINUED | OUTPATIENT
Start: 2019-01-21 | End: 2019-01-22 | Stop reason: HOSPADM

## 2019-01-21 RX ADMIN — METOPROLOL TARTRATE 25 MG: 25 TABLET ORAL at 09:29

## 2019-01-21 RX ADMIN — DOCUSATE SODIUM 100 MG: 100 CAPSULE, LIQUID FILLED ORAL at 09:28

## 2019-01-21 RX ADMIN — SODIUM CHLORIDE, PRESERVATIVE FREE 3 ML: 5 INJECTION INTRAVENOUS at 09:57

## 2019-01-21 RX ADMIN — IPRATROPIUM BROMIDE AND ALBUTEROL SULFATE 3 ML: .5; 3 SOLUTION RESPIRATORY (INHALATION) at 07:11

## 2019-01-21 RX ADMIN — ESCITALOPRAM OXALATE 10 MG: 10 TABLET ORAL at 09:28

## 2019-01-21 RX ADMIN — IPRATROPIUM BROMIDE AND ALBUTEROL SULFATE 3 ML: .5; 3 SOLUTION RESPIRATORY (INHALATION) at 19:59

## 2019-01-21 RX ADMIN — CALCIUM CARBONATE-VITAMIN D TAB 500 MG-200 UNIT 500 MG: 500-200 TAB at 21:58

## 2019-01-21 RX ADMIN — ONDANSETRON 4 MG: 2 INJECTION INTRAMUSCULAR; INTRAVENOUS at 02:30

## 2019-01-21 RX ADMIN — METOPROLOL TARTRATE 25 MG: 25 TABLET ORAL at 21:59

## 2019-01-21 RX ADMIN — ASPIRIN 81 MG: 81 TABLET, COATED ORAL at 09:29

## 2019-01-21 RX ADMIN — CALCIUM CARBONATE-VITAMIN D TAB 500 MG-200 UNIT 500 MG: 500-200 TAB at 09:28

## 2019-01-21 RX ADMIN — TAMSULOSIN HYDROCHLORIDE 0.4 MG: 0.4 CAPSULE ORAL at 21:59

## 2019-01-21 RX ADMIN — MORPHINE SULFATE 2 MG: 2 INJECTION, SOLUTION INTRAMUSCULAR; INTRAVENOUS at 02:29

## 2019-01-21 RX ADMIN — BUDESONIDE AND FORMOTEROL FUMARATE DIHYDRATE 2 PUFF: 80; 4.5 AEROSOL RESPIRATORY (INHALATION) at 19:59

## 2019-01-21 RX ADMIN — HYDROCODONE BITARTRATE AND ACETAMINOPHEN 1 TABLET: 5; 325 TABLET ORAL at 10:18

## 2019-01-21 RX ADMIN — HYDROCODONE BITARTRATE AND ACETAMINOPHEN 1 TABLET: 5; 325 TABLET ORAL at 21:58

## 2019-01-21 RX ADMIN — HYDROCODONE BITARTRATE AND ACETAMINOPHEN 1 TABLET: 5; 325 TABLET ORAL at 00:35

## 2019-01-21 RX ADMIN — BUDESONIDE AND FORMOTEROL FUMARATE DIHYDRATE 2 PUFF: 80; 4.5 AEROSOL RESPIRATORY (INHALATION) at 07:11

## 2019-01-21 RX ADMIN — SODIUM CHLORIDE, PRESERVATIVE FREE 3 ML: 5 INJECTION INTRAVENOUS at 21:59

## 2019-01-21 RX ADMIN — DOCUSATE SODIUM 100 MG: 100 CAPSULE, LIQUID FILLED ORAL at 21:58

## 2019-01-21 RX ADMIN — ONDANSETRON 4 MG: 2 INJECTION INTRAMUSCULAR; INTRAVENOUS at 15:34

## 2019-01-21 RX ADMIN — HYDROCODONE BITARTRATE AND ACETAMINOPHEN 1 TABLET: 5; 325 TABLET ORAL at 05:49

## 2019-01-21 RX ADMIN — IPRATROPIUM BROMIDE AND ALBUTEROL SULFATE 3 ML: .5; 3 SOLUTION RESPIRATORY (INHALATION) at 13:42

## 2019-01-21 RX ADMIN — ATORVASTATIN CALCIUM 40 MG: 40 TABLET, FILM COATED ORAL at 09:28

## 2019-01-21 NOTE — PLAN OF CARE
Problem: Syncope (Adult)  Goal: Identify Related Risk Factors and Signs and Symptoms  Outcome: Outcome(s) achieved Date Met: 01/21/19 01/21/19 1139   Syncope (Adult)   Related Risk Factors (Syncope) hypotension;medication effects;stress, psychological   Signs and Symptoms (Syncope) temporary loss of consciousness     Goal: Physical Safety/Health Maintenance  Outcome: Ongoing (interventions implemented as appropriate)    Goal: Optimal Emotional/Functional [place]  Outcome: Ongoing (interventions implemented as appropriate)

## 2019-01-21 NOTE — PLAN OF CARE
Problem: Patient Care Overview  Goal: Plan of Care Review  Outcome: Ongoing (interventions implemented as appropriate)   01/21/19 0458   Coping/Psychosocial   Plan of Care Reviewed With patient   Plan of Care Review   Progress no change   OTHER   Outcome Summary VSS. Pt reports times of dizziness. Pat reports pain in laceration on back of head. Continue care as ordered.       Problem: Fall Risk (Adult)  Goal: Identify Related Risk Factors and Signs and Symptoms  Outcome: Ongoing (interventions implemented as appropriate)    Goal: Absence of Fall  Outcome: Ongoing (interventions implemented as appropriate)

## 2019-01-21 NOTE — H&P
[facility] Medicine [facility]  HISTORY AND PHYSICAL    Primary Care Physician: Miguel Rojas MD    Subjective     Chief Complaint:    Chief Complaint   Patient presents with   • Syncope       History of Present Illness:   Patient is a 68-year-old white male with multiple medical problems as listed below in the past medical history who has not been eating and drinking well.  He got up today to walk to the refrigerator to get something to drink and do not remember anything until he woke up on the floor and had a bleeding from his head.  He does have CVR aches implantable pulse generator placed by Dr. Ngo at [facility] since 2016.  He did mention that he has similar issues off and on loss was about a year ago.  He has done fairly well since this implantable device.  He also had CardioSEAL occluder placed in 2003 by Dr. Alves.  After the fall he called his sister-in-law who came in to see him and saw a lot of blood call 911 to bring him to the emergency room.  He is being admitted for syncopal episode.  I have reviewed labs/imaging/records from this hospitalization, including ER staff to establish a comprehensive understanding of this patient's clinical issues, as well as to establish plan of care appropriately.     Review of Systems   1. Constitutional: Negative for fever. Negative for chills, diaphoresis, positive for fatigue and denies any unexpected weight change.   2. HENT: Negative for congestion and hearing loss.   3. Eyes: Negative for redness and visual disturbance.   4. Respiratory: negative for shortness of breath. Negative for chest pain . Negative for cough and chest tightness.  Does have a BiPAP and is ready compliant with it.  5. Cardiovascular: Negative for chest pain and palpitations.   6. Gastrointestinal: Negative for abdominal distention, abdominal pain and blood in stool.   7. Endocrine: Negative for cold intolerance and heat intolerance.   8. Genitourinary: Negative for  difficulty urinating, dysuria and frequency.   9. Musculoskeletal: Positive for arthralgias, back pain and myalgias.   10. Skin: Negative for color change, rash and wound.   11. Neurological: Positive for syncope, weakness and headaches.  He has an abrasion in the head.  12. Hematological: Negative for adenopathy. Does not bruise/bleed easily.   13. Psychiatric/Behavioral: Negative for confusion. The patient is not nervous/anxious.     Past Medical History:   Past Medical History:   Diagnosis Date   • Abnormal liver enzymes    • Anemia    • Anxiety    • Arthritis    • Atherosclerotic heart disease of native coronary artery without angina pectoris    • Atrial fibrillation (CMS/HCC)    • Back pain    • BPH (benign prostatic hyperplasia)    • Cataract, bilateral    • Chest pain     2-3 MONTHS AGO.  PER PT, HAS ADDRESSED WITH CARDIOLOGIST.  STATES HAS NTG PRN.   • CHF (congestive heart failure) (CMS/HCC)    • Chronic bronchitis (CMS/HCC)    • Chronic kidney disease    • COPD (chronic obstructive pulmonary disease) (CMS/HCC)    • Degeneration of cervical intervertebral disc    • Depression    • Diverticulosis    • Dyspnea    • Esophageal reflux    • Esophagitis    • Gastritis    • Hematuria    • Hemorrhoids    • Hiatal hernia    • History of arthritis    • History of nuclear stress test     UNSURE OF DATE   • History of peripheral neuropathy    • History of ventricular septal defect    • History of ventricular septal defect    • Hyperlipidemia    • Hypertension    • Infectious diarrhea    • Kyphosis, acquired    • Lumbar radiculopathy    • Mitral and aortic valve disease    • Nephrolithiasis    • Phimosis    • Problems with swallowing     WITH FOOD   • Respiratory failure (CMS/HCC)    • Sleep apnea     BIPAP   • TIA (transient ischemic attack)     X3.   2014   • Ventral hernia    • Wears glasses     FOR READING       Past Surgical History:  Past Surgical History:   Procedure Laterality Date   • CARDIAC CATHETERIZATION    "  • CARDIAC CATHETERIZATION N/A 5/24/2018    Procedure: Left Heart Cath;  Surgeon: Edouard Robertson MD;  Location: [facility] CATH INVASIVE LOCATION;  Service: Cardiovascular   • HERNIA REPAIR     • ORTHOPEDIC SURGERY Left     Left hip arthroplasty   • SUPRAPUBIC CATHETER INSERTION      History of Percutaneous Catheter Placement Into Ureter   • THROAT SURGERY      FOR SLEEP APNEA   • VSD REPAIR      History of VSD Repair By Patch Closure       Family History: family history includes Coronary artery disease in his father; Other in his father.    Social History:  reports that he quit smoking about 2 years ago. His smoking use included cigarettes. He has a 90.00 pack-year smoking history. he has never used smokeless tobacco. He reports that he does not drink alcohol or use drugs.    Medications:    (Not in a hospital admission)    Allergies:  Allergies   Allergen Reactions   • Mucomyst [Acetylcysteine] Other (See Comments)     bronchospasms   • Milk-Related Compounds Nausea And Vomiting   • Sulfa Antibiotics Nausea And Vomiting         Objective     Physical Exam:  Vital Signs: /73   Pulse 89   Temp 98.9 °F (37.2 °C) (Oral)   Resp 16   Ht 172.7 cm (68\")   Wt 74.8 kg (165 lb)   SpO2 98%   BMI 25.09 kg/m²      Physical Exam:     General Appearance:   Alert, cooperative, in no acute distress.     Head:   Normocephalic, without obvious abnormality, trauma to the head noted no active bleeding.     Eyes:       Normal, conjunctivae and sclerae, no icterus, no pallor, corneas clear, PERRLA        Throat:   Oral mucosa dry      Neck:  No adenopathy, supple, trachea midline, no thyromegaly, no carotid bruit, no JVD      Back:   No CVA tenderness on Percussion.     Lungs:    Clear to auscultation and fair air movement noted.      Heart:   Regular rhythm and normal rate, normal S1 and S2.       Abdomen:   Obese. Normal bowel sounds, no masses, no organomegaly, soft non-tender, non-distended, no guarding, no rebound " tenderness        Extremities:  Moves all extremities, no edema, no cyanosis, no redness.     Pulses:  Pulses palpable and equal bilaterally but weak.     Skin:  No bleeding, bruising or rash        Neurologic:  Cranial nerves grossly intact, move all extremities         Results Review:  Lab Results (last 7 days)     Procedure Component Value Units Date/Time    Troponin [[id number]]  (Abnormal) Collected:  01/20/19 2246    Specimen:  Blood Updated:  01/20/19 2317     Troponin I 0.052 ng/mL     Narrative:       Normal Patient Upper Reference Limit (URL) (99th Percentile)=0.03 ng/mL   Non-AMI Illness Reference Limit=0.03-0.11 ng/mL   AMI Confirmation=0.12 ng/mL and above    Troponin [[id number]]  (Abnormal) Collected:  01/20/19 1959    Specimen:  Blood Updated:  01/20/19 2029     Troponin I 0.043 ng/mL     Narrative:       Normal Patient Upper Reference Limit (URL) (99th Percentile)=0.03 ng/mL   Non-AMI Illness Reference Limit=0.03-0.11 ng/mL   AMI Confirmation=0.12 ng/mL and above    Blood Gas, Arterial With Co-Ox [[id number]]  (Abnormal) Collected:  01/20/19 1925    Specimen:  Arterial Blood Updated:  01/20/19 1927     Site Right Radial     Mynor's Test Positive     pH, Arterial 7.402 pH units      pCO2, Arterial 60.6 mm Hg      Comment: 83 Value above reference range        pO2, Arterial 123.0 mm Hg      Comment: 83 Value above reference range        HCO3, Arterial 37.7 mmol/L      Comment: 83 Value above reference range        Base Excess, Arterial 10.9 mmol/L      Comment: 83 Value above reference range        O2 Saturation, Arterial 99.2 %      Comment: 83 Value above reference range        Hemoglobin, Blood Gas 11.4 g/dL      Comment: 84 Value below reference range        Hematocrit, Blood Gas 35.1 %      Comment: 84 Value below reference range        Oxyhemoglobin 97.6 %      Methemoglobin 0.90 %      Carboxyhemoglobin 0.7 %      A-a Gradiant -- mmHg      Comment: UNABLE TO CALCULATE        Barometric Pressure  for Blood Gas 741 mmHg      Modality Nasal Cannula     FIO2 36 %      Flow Rate 4.0 lpm      Ventilator Mode NA     Comment: Meter: [id number]     :  [id number]        Note --     pH, Temp Corrected -- pH Units      pCO2, Temperature Corrected -- mm Hg      pO2, Temperature Corrected -- mm Hg     [place] Draw [[id number]] Collected:  01/20/19 1757    Specimen:  Blood Updated:  01/20/19 1900    Narrative:       The following orders were created for panel order [place] Draw.  Procedure                               Abnormality         Status                     ---------                               -----------         ------                     Light Blue Top[[id number]]                                   Final result               Lavender Top[[id number]]                                     Final result               Gold Top - SST[[id number]]                                   Final result               Green Top (No Gel)[[id number]]                               Final result                 Please view results for these tests on the individual orders.    Light Blue Top [[id number]] Collected:  01/20/19 1757    Specimen:  Blood Updated:  01/20/19 1900     Extra Tube hold for add-on     Comment: Auto resulted       Lavender Top [[id number]] Collected:  01/20/19 1757    Specimen:  Blood Updated:  01/20/19 1900     Extra Tube hold for add-on     Comment: Auto resulted       Gold Top - SST [[id number]] Collected:  01/20/19 1757    Specimen:  Blood Updated:  01/20/19 1900     Extra Tube Hold for add-ons.     Comment: Auto resulted.       Green Top (No Gel) [[id number]] Collected:  01/20/19 1757    Specimen:  Blood Updated:  01/20/19 1900     Extra Tube Hold for add-ons.     Comment: Auto resulted.       Comprehensive Metabolic Panel [[id number]]  (Abnormal) Collected:  01/20/19 1757    Specimen:  Blood Updated:  01/20/19 1826     Glucose 101 mg/dL      BUN 50 mg/dL      Creatinine 1.40 mg/dL      Sodium 139 mmol/L       Potassium 4.8 mmol/L      Comment: Specimen hemolyzed.  Results may be affected.        Chloride 99 mmol/L      CO2 34.0 mmol/L      Calcium 9.6 mg/dL      Total Protein 7.2 g/dL      Comment: Specimen hemolyzed.  Results may be affected.        Albumin 4.60 g/dL      Comment: Specimen hemolyzed. Results may be affected.        ALT (SGPT) 25 U/L      Comment: Specimen hemolyzed.  Results may be affected.        AST (SGOT) 45 U/L      Comment: Specimen hemolyzed.  Results may be affected.        Alkaline Phosphatase 68 U/L      Comment: Specimen hemolyzed. Results may be affected.        Total Bilirubin 1.5 mg/dL      eGFR Non African Amer 50 mL/min/1.73      Globulin 2.6 gm/dL      A/G Ratio 1.8 g/dL      BUN/Creatinine Ratio 35.7     Anion Gap 10.8 mmol/L     Narrative:       GFR Normal >60  Chronic Kidney Disease <60  Kidney Failure <15    Magnesium [[id number]]  (Normal) Collected:  01/20/19 1757    Specimen:  Blood Updated:  01/20/19 1826     Magnesium 2.1 mg/dL      Comment: Specimen hemolyzed.  Results may be affected.       Troponin [[id number]]  (Normal) Collected:  01/20/19 1757    Specimen:  Blood Updated:  01/20/19 1826     Troponin I 0.034 ng/mL      Comment: Specimen hemolyzed.  Results may be affected.       Narrative:       Normal Patient Upper Reference Limit (URL) (99th Percentile)=0.03 ng/mL   Non-AMI Illness Reference Limit=0.03-0.11 ng/mL   AMI Confirmation=0.12 ng/mL and above    CBC & Differential [[id number]] Collected:  01/20/19 1757    Specimen:  Blood Updated:  01/20/19 1803    Narrative:       The following orders were created for panel order CBC & Differential.  Procedure                               Abnormality         Status                     ---------                               -----------         ------                     CBC Auto Differential[[id number]]        Abnormal            Final result                 Please view results for these tests on the individual orders.    CBC  Auto Differential [[id number]]  (Abnormal) Collected:  01/20/19 1757    Specimen:  Blood Updated:  01/20/19 1803     WBC 7.91 10*3/mm3      RBC 3.66 10*6/mm3      Hemoglobin 11.8 g/dL      Hematocrit 36.2 %      MCV 98.9 fL      MCH 32.2 pg      MCHC 32.6 g/dL      RDW 12.0 %      RDW-SD 43.9 fl      MPV 8.7 fL      Platelets 178 10*3/mm3      Neutrophil % 72.3 %      Lymphocyte % 16.6 %      Monocyte % 8.7 %      Eosinophil % 1.6 %      Basophil % 0.4 %      Immature Grans % 0.4 %      Neutrophils, Absolute 5.72 10*3/mm3      Lymphocytes, Absolute 1.31 10*3/mm3      Monocytes, Absolute 0.69 10*3/mm3      Eosinophils, Absolute 0.13 10*3/mm3      Basophils, Absolute 0.03 10*3/mm3      Immature Grans, Absolute 0.03 10*3/mm3      nRBC 0.0 /100 WBC         Imaging Results (last 72 hours)     Procedure Component Value Units Date/Time    CT Cervical Spine Without Contrast [[id number]] Collected:  01/20/19 1916     Updated:  01/20/19 1917    Narrative:       FINAL REPORT    TECHNIQUE:  Thin section axial images were obtained through the cervical  spine without contrast.  Coronal and sagittal reconstructed  images were then provided.    CLINICAL HISTORY:  fall    FINDINGS:  Bilateral vagus nerve stimulators are seen.  The prevertebral  soft tissues are normal. There is normal alignment and  curvature.   There is no fracture.  There are mild changes of  degenerative disc disease.      Impression:       No acute abnormality.    Authenticated by Savage Huynh M.D. on 01/20/2019 07:16:16 PM    CT Head Without Contrast [[id number]] Collected:  01/20/19 1915     Updated:  01/20/19 1916    Narrative:       FINAL REPORT    TECHNIQUE:  Routine axial images through the head were obtained without  contrast.    CLINICAL HISTORY:  . syncope, fall    FINDINGS:  The ventricles are normal.  There is no mass or other abnormal  hypodensity.  There is no shift of midline structures.  There is  no intracranial hemorrhage.  No significant osseous or  sinus  abnormality is seen.      Impression:       Unremarkable.    Authenticated by Savage Huynh M.D. on 01/20/2019 07:15:09 PM    XR Knee 3 View Left [[id number]] Updated:  01/20/19 1849    XR Chest 1 View [[id number]] Updated:  01/20/19 1848              I have personally reviewed and interpreted available lab data, radiology studies and ECG obtained at time of admission.     Assessment / Plan     Assessment/Problem List:     Syncope and collapse    Anxiety    Atherosclerotic heart disease of native coronary artery without angina pectoris    Chronic kidney disease, stage III (moderate) (CMS/HCC)    Steroid-dependent COPD (CMS/HCC)    Diabetes mellitus (CMS/HCC)    GERD without esophagitis    Hyperlipidemia    Hypertension    Mitral and aortic valve disease    Sleep apnea    Depression    History of ventricular septal defect      Plan:  · Patient is being admitted to the floor secondary to syncopal episode.  I'll hold his blood pressure medications, continue to follow him closely.  He will be evaluated by cardiology in the morning.  · Cardiac echo has been ordered.  · Continue with the home medications.  · His family did mention that we'll bring his home BiPAP.  · Details were discussed with the patient as well as family in the room.   · Further recommendations will depend on clinical course of the patient during the current hospitalization.    · I also discussed the details with the nursing staff.    Rest as ordered.    Anticipated stay is greater than 2 midnights.    Markel Owens MD, ERIKA 01/21/19 1:36 AM    Dictated using Dragon.

## 2019-01-21 NOTE — ED PROVIDER NOTES
Subjective   Patient states he had a syncopal episode while standing at the refrigerator to get a drink of water.  He really been up for a few moments as he walked from his bedroom to the kitchen and while sitting at refrigerator states he felt overall generally weak and fell backwards striking his head he believes either on the floor or on the counter he states he did have loss consciousness and he was alone at the time.  When he awakened he called family who subsequently called EMS and he was brought here for evaluation.  He has had similar episodes of syncope in the past.  He wears oxygen around-the-clock at home and has a history of COPD.  He states he had no chest pain or shortness of breath at the time of the syncope or directly after however once arriving in the ER he began having mild chest discomfort.  He states he is no more short of breath than normal.  He said he had mild pain in his neck after the fall however that is improving and he has a small skin tear to the left thigh and pain in the left lateral knee.  He states he was ambulatory after the fall.  He is on Xarelto.            Review of Systems   Respiratory: Positive for chest tightness. Negative for shortness of breath.    Skin:        Skin tear to the left thigh, laceration to the posterior scalp   Neurological: Positive for syncope.       Past Medical History:   Diagnosis Date   • Abnormal liver enzymes    • Anemia    • Anxiety    • Arthritis    • Atherosclerotic heart disease of native coronary artery without angina pectoris    • Atrial fibrillation (CMS/HCC)    • Back pain    • BPH (benign prostatic hyperplasia)    • Cataract, bilateral    • Chest pain     2-3 MONTHS AGO.  PER PT, HAS ADDRESSED WITH CARDIOLOGIST.  STATES HAS NTG PRN.   • CHF (congestive heart failure) (CMS/HCC)    • Chronic bronchitis (CMS/HCC)    • Chronic kidney disease    • COPD (chronic obstructive pulmonary disease) (CMS/HCC)    • Degeneration of cervical intervertebral  disc    • Depression    • Diverticulosis    • Dyspnea    • Esophageal reflux    • Esophagitis    • Gastritis    • Hematuria    • Hemorrhoids    • Hiatal hernia    • History of arthritis    • History of nuclear stress test     UNSURE OF DATE   • History of peripheral neuropathy    • History of ventricular septal defect    • History of ventricular septal defect    • Hyperlipidemia    • Hypertension    • Infectious diarrhea    • Kyphosis, acquired    • Lumbar radiculopathy    • Mitral and aortic valve disease    • Nephrolithiasis    • Phimosis    • Problems with swallowing     WITH FOOD   • Respiratory failure (CMS/HCC)    • Sleep apnea     BIPAP   • TIA (transient ischemic attack)     X3.   2014   • Ventral hernia    • Wears glasses     FOR READING       Allergies   Allergen Reactions   • Mucomyst [Acetylcysteine] Other (See Comments)     bronchospasms   • Milk-Related Compounds Nausea And Vomiting   • Sulfa Antibiotics Nausea And Vomiting       Past Surgical History:   Procedure Laterality Date   • CARDIAC CATHETERIZATION     • CARDIAC CATHETERIZATION N/A 5/24/2018    Procedure: Left Heart Cath;  Surgeon: Edouard Robertson MD;  Location: [facility] CATH INVASIVE LOCATION;  Service: Cardiovascular   • HERNIA REPAIR     • ORTHOPEDIC SURGERY Left     Left hip arthroplasty   • SUPRAPUBIC CATHETER INSERTION      History of Percutaneous Catheter Placement Into Ureter   • THROAT SURGERY      FOR SLEEP APNEA   • VSD REPAIR      History of VSD Repair By Patch Closure       Family History   Problem Relation Age of Onset   • Other Father         CABG   • Coronary artery disease Father        Social History     Socioeconomic History   • Marital status:      Spouse name: Not on file   • Number of children: Not on file   • Years of education: Not on file   • Highest education level: Not on file   Occupational History     Employer: RETIRED   Tobacco Use   • Smoking status: Former Smoker     Packs/day: 3.00     Years: 30.00     Pack  years: 90.00     Types: Cigarettes     Last attempt to quit: 2017     Years since quittin.0   • Smokeless tobacco: Never Used   Substance and Sexual Activity   • Alcohol use: No   • Drug use: No   • Sexual activity: Defer           Objective   Physical Exam   Constitutional: He appears well-developed and well-nourished. No distress.   HENT:   Head: Head is without Mclain's sign.   There is an approximately 1-1/2 inch laceration to the base of the skull.   Eyes: EOM are normal.   Neck: Normal range of motion. Muscular tenderness present. No spinous process tenderness present.   Skin: He is not diaphoretic.       Laceration Repair  Date/Time: 2019 9:30 PM  Performed by: Dustin Aceves PA-C  Authorized by: Jacquie Lawson MD     Consent:     Consent obtained:  Verbal    Consent given by:  Patient    Risks discussed:  Poor cosmetic result    Alternatives discussed:  No treatment  Anesthesia (see MAR for exact dosages):     Anesthesia method:  Local infiltration    Local anesthetic:  Lidocaine 1% WITH epi  Laceration details:     Location:  Scalp    Scalp location:  Occipital    Length (cm):  2.5  Repair type:     Repair type:  Simple  Exploration:     Hemostasis achieved with:  Direct pressure    Contaminated: no    Treatment:     Area cleansed with:  Hibiclens    Amount of cleaning:  Standard    Irrigation solution:  Tap water  Skin repair:     Repair method:  Staples    Number of staples:  5  Approximation:     Approximation:  Close    Vermilion border: well-aligned    Post-procedure details:     Dressing:  Open (no dressing)               ED Course    2:18 PM  I spoke with Dr. Lyman at [facility] on call for Dr. Bass. States with cath in May wouln't cath again right now but reasonable to obs here in [place] and consult Dr. Bass tomorrow. Family prefers obs as pt lives alone and is concerned he may fall again.     2:18 PM  Repeat EKG shows no acute change. Discussed with pt  and will see if beds available at [facility] per his preference for continuity of care. If unable to plan to consult Dr. Acosta and admit here.    2:18 PM.Dr. Acosta agrees to consult in the morning, requests admission to the hospitalist.    2:18 PM  Dr. Owens has seen pt in the ED agrees to admit. No ICU beds, will board in ED for time being.        MDM      Final diagnoses:   Syncope and collapse   Elevated troponin            Dustin Aceves PA-C  01/21/19 2691

## 2019-01-21 NOTE — DISCHARGE INSTR - OTHER ORDERS
If you have any questions about your recovery, please call the [facility] Nurse Call Center at [phone]. A registered nurse is available 24 hours a day 7 days a week to assist you.

## 2019-01-21 NOTE — SIGNIFICANT NOTE
01/21/19 1300   Rehab Time/Intention   Evaluation Not Performed unable to evaluate, medical status change  (Hold per SN; unstable 02 sats)   Rehab Treatment   Discipline occupational therapist

## 2019-01-21 NOTE — PLAN OF CARE
Problem: Patient Care Overview  Goal: Plan of Care Review  Outcome: Ongoing (interventions implemented as appropriate)   01/21/19 0710   Coping/Psychosocial   Plan of Care Reviewed With patient   Plan of Care Review   Progress no change   OTHER   Outcome Summary Patient on 4L NC and bipap as needed. VSS. A-FIB o n tele. Will continue to monitor.

## 2019-01-21 NOTE — PROGRESS NOTES
PROGRESS NOTE        Date of Admission: 1/20/2019  Length of Stay: 0  Primary Care Physician: Miguel Rojas MD    Subjective   Chief Complaint: Follow up syncope  HPI: This is a 68 year old male with multiple medical problems who was brought into the ER last evening for a syncopial episode.  He had gotten up to go to kitchen and does not remember anything until he woke up on the floor bleeding from his head.  He does have a [employer] implantable pulse generator placed on 2016 by Dr. Ngo at .  Upon admission cardiology was consulted.  He was seen by cardiology this morning and he has ordered Echo for further evaluation.  He did complain of headache this morning and some nausea for which I did repeat CT head which is negative.  He is on telemetry.  Will await further recommendations from cardiology following his echocardiogram.    Patient is lying in bed and does feel weak.  I have ordered PT/OT to evaluate.  He denies chest pain, SOA, nausea or vomiting currently         Review Of Systems:   Review of Systems   General ROS: Patient denies any fevers, chills or loss of consciousness.  Headache   Psychological ROS: Denies any hallucinations and delusions.  Ophthalmic ROS: Denies any diplopia or transient loss of vision.  ENT ROS: Denies sore throat, nasal congestion or ear pain.   Hematological and Lymphatic ROS: Denies neck swelling or easy bleeding.  Endocrine ROS: Denies any recent unintentional weight gain or loss.  Respiratory ROS: Denies cough or shortness of breath.   Cardiovascular ROS: Denies chest pain or palpitations. No history of exertional chest pain.   Gastrointestinal ROS: Denies nausea and vomiting. Denies any abdominal pain. No diarrhea.  Genito-Urinary ROS: Denies dysuria or hematuria.  Musculoskeletal ROS: Denies back pain. No muscle pain. No calf pain.   Neurological ROS: Denies any focal weakness. No loss of consciousness. Denies any numbness. Denies neck pain.   Dermatological ROS: Denies any  redness or pruritis.    Objective      Temp:  [97.5 °F (36.4 °C)-98.9 °F (37.2 °C)] 98.2 °F (36.8 °C)  Heart Rate:  [74-96] 79  Resp:  [16-20] 18  BP: (121-174)/(56-90) 142/74  Physical Exam    General Appearance:  Alert and cooperative, not in any acute distress.   Head:  Atraumatic and normocephalic, without obvious abnormality.  Staples and hematoma to back of head with staples intact.   Eyes:          PERRLA, conjunctivae and sclerae normal, no Icterus. No pallor. Extraocular movements are within normal limits.   Ears:  Ears appear intact with no abnormalities noted.   Throat: No oral lesions, no thrush, oral mucosa moist.   Neck: Supple, trachea midline, no thyromegaly, no carotid bruit.       Lungs:   Chest shape is normal. Breath sounds heard bilaterally equally.  No crackles or wheezing. No Pleural rub or bronchial breathing.   Heart:  Normal S1 and S2, no murmur, no gallop, no rub. No JVD   Abdomen:   Normal bowel sounds, no masses, no organomegaly. Soft    non-tender, non-distended, no guarding, no rebound             tenderness   Extremities: Moves all extremities well, no edema, no cyanosis, no clubbing.   Pulses: Pulses palpable and equal bilaterally   Skin: No bleeding, bruising or rash       Neurologic:    Psychiatric/Behavior:     Cranial nerves 2 - 12 grossly intact, sensation intact, Motor power is normal and equal bilaterally.  Mood normal, behavior normal       Results Review:    I have reviewed the labs, radiology results and diagnostic studies.    Results from last 7 days   Lab Units 01/21/19  0532   WBC 10*3/mm3 8.93   HEMOGLOBIN g/dL 10.5*   PLATELETS 10*3/mm3 170     Results from last 7 days   Lab Units 01/21/19  0532   SODIUM mmol/L 139   POTASSIUM mmol/L 3.9   CO2 mmol/L 38.0*   CREATININE mg/dL 1.40*   GLUCOSE mg/dL 100*       Culture Data:    Radiology Data:   Cardiology Data:    I have reviewed the medications.    Assessment/Plan     Assessment and Plan:  1.  Syncopial episode-  Cardiology consulted.  Telemetry  2.  Posterior scalp laceration s/p staples X 6  3.   COPD with no evidence of exacerbation  4.  Diabetes mellitus-  Monitor BS and titrate according insulin protocol  5.  Sleep apnea-  If patient does not have home machine then I will order Bipap for tonight  6.  Chronic essential hypertension-  BP stable  7.  Chronic kidney disease stage III- Baseline        DVT prophylaxis:  Held due to scalp hematoma  Discharge Planning:   Monse Evans, APRN 01/21/19 2:29 PM

## 2019-01-21 NOTE — PROGRESS NOTES
Discharge Planning Assessment  [facility]     Patient Name: Jani Pena  MRN: [id number]  Today's Date: 1/21/2019    Admit Date: 1/20/2019    Discharge Needs Assessment     Row Name 01/21/19 1223       Living Environment    Lives With  alone    Current Living Arrangements  home/apartment/condo    Potentially Unsafe Housing Conditions  -- Pt denies concerns    Primary Care Provided by  self    Provides Primary Care For  no one    Family Caregiver if Needed  none    Quality of Family Relationships  supportive    Able to Return to Prior Arrangements  yes       Resource/Environmental Concerns    Resource/Environmental Concerns  none       Transition Planning    Patient/Family Anticipates Transition to  home    Patient/Family Anticipated Services at Transition  none    Transportation Anticipated  car, drives self;family or friend will provide       Discharge Needs Assessment    Readmission Within the Last 30 Days  no previous admission in last 30 days    Concerns to be Addressed  denies needs/concerns at this time    Equipment Currently Used at Home  rollator;shower chair;grab bar;nebulizer;oxygen;bipap/cpap 4L continuous    Anticipated Changes Related to Illness  none    Equipment Needed After Discharge  none    Discharge Facility/Level of Care Needs  home with home health    Offered/Gave Vendor List  yes        Discharge Plan     Row Name 01/21/19 1232       Plan    Plan  Home    Patient/Family in Agreement with Plan  yes    Plan Comments  Spoke with pt in room re [facility]; he is alone.  Pt reports that he lives in a house alone.  He is independent and plans to return home.  However, he reports that he is considering going to assisted living.  Pt uses a Bipap and oxygen at 4L continuous supplied by [employer].  Additionally, he has a nebulizer, rollator, shower chair, raise toilet seat and grab bars.  He had [facility] in the past.  Pt can provide his own transportation, but his brothers are able to help when needed.    He has a lady that helps clean.  Address, phone and PCP verified.  CM will continue to follow and assist with discharge as needed.      Called [facility]; spoke with Samanta.  Per Samanta they have not provided services to pt since May, 2018.      Called [employer]; spoke with Alexandra.  Alexandra verified that they DO supply pt's oxygen needs.        Destination      No service coordination in this encounter.      Durable Medical Equipment      No service coordination in this encounter.      Dialysis/Infusion      No service coordination in this encounter.      Home Medical Care      No service coordination in this encounter.      Community Resources      No service coordination in this encounter.        Expected Discharge Date and Time     Expected Discharge Date Expected Discharge Time    Jan 23, 2019         Demographic Summary     Row Name 01/21/19 1220       General Information    Admission Type  observation    Arrived From  emergency department    Required Notices Provided  Observation Status Notice    Referral Source  admission list    Reason for Consult  discharge planning    Preferred Language  English     Used During This Interaction  no        Functional Status     Row Name 01/21/19 1222       Functional Status    Usual Activity Tolerance  moderate       Functional Status, IADL    Medications  independent    Meal Preparation  independent    Housekeeping  assistive person    Laundry  independent    Shopping  independent       Mental Status    General Appearance WDL  WDL       Mental Status Summary    Recent Changes in Mental Status/Cognitive Functioning  no changes       Employment/    Employment Status  retired        Psychosocial    No documentation.       Abuse/Neglect    No documentation.       Legal    No documentation.       Substance Abuse    No documentation.       Patient Forms    No documentation.           Danielle Lundy

## 2019-01-21 NOTE — SIGNIFICANT NOTE
01/21/19 7467   Rehab Time/Intention   Evaluation Not Performed unable to evaluate, medical status change  (Patient is on hold for PT today due to decreased oxygen saturations.  PT to attempt again tomorrow.)   Rehab Treatment   Discipline physical therapist

## 2019-01-22 ENCOUNTER — HOSPITAL ENCOUNTER (INPATIENT)
Facility: HOSPITAL | Age: 69
LOS: 14 days | Discharge: NURSING, REHAB OR LONG-TERM CARE | End: 2019-02-05
Attending: INTERNAL MEDICINE | Admitting: INTERNAL MEDICINE

## 2019-01-22 VITALS
WEIGHT: 166.38 LBS | BODY MASS INDEX: 25.22 KG/M2 | SYSTOLIC BLOOD PRESSURE: 100 MMHG | HEART RATE: 83 BPM | HEIGHT: 68 IN | RESPIRATION RATE: 18 BRPM | TEMPERATURE: 97.7 F | DIASTOLIC BLOOD PRESSURE: 56 MMHG | OXYGEN SATURATION: 93 %

## 2019-01-22 DIAGNOSIS — Z74.09 IMPAIRED FUNCTIONAL MOBILITY, BALANCE, GAIT, AND ENDURANCE: Primary | ICD-10-CM

## 2019-01-22 DIAGNOSIS — I50.22 CHRONIC SYSTOLIC CONGESTIVE HEART FAILURE (HCC): ICD-10-CM

## 2019-01-22 DIAGNOSIS — I48.19 PERSISTENT ATRIAL FIBRILLATION (HCC): ICD-10-CM

## 2019-01-22 DIAGNOSIS — R13.19 ESOPHAGEAL DYSPHAGIA: ICD-10-CM

## 2019-01-22 DIAGNOSIS — E11.8 TYPE 2 DIABETES MELLITUS WITH COMPLICATION, WITHOUT LONG-TERM CURRENT USE OF INSULIN (HCC): ICD-10-CM

## 2019-01-22 DIAGNOSIS — Z74.09 IMPAIRED MOBILITY AND ADLS: ICD-10-CM

## 2019-01-22 DIAGNOSIS — E11.22 TYPE 2 DIABETES MELLITUS WITH STAGE 3 CHRONIC KIDNEY DISEASE, WITHOUT LONG-TERM CURRENT USE OF INSULIN (HCC): ICD-10-CM

## 2019-01-22 DIAGNOSIS — R11.0 NAUSEA: ICD-10-CM

## 2019-01-22 DIAGNOSIS — N18.30 TYPE 2 DIABETES MELLITUS WITH STAGE 3 CHRONIC KIDNEY DISEASE, WITHOUT LONG-TERM CURRENT USE OF INSULIN (HCC): ICD-10-CM

## 2019-01-22 DIAGNOSIS — Z78.9 IMPAIRED MOBILITY AND ADLS: ICD-10-CM

## 2019-01-22 DIAGNOSIS — R13.11 ORAL PHASE DYSPHAGIA: ICD-10-CM

## 2019-01-22 DIAGNOSIS — R53.81 PHYSICAL DECONDITIONING: ICD-10-CM

## 2019-01-22 DIAGNOSIS — I47.20 VENTRICULAR TACHYCARDIA (HCC): ICD-10-CM

## 2019-01-22 PROBLEM — R11.2 N&V (NAUSEA AND VOMITING): Status: ACTIVE | Noted: 2019-01-22

## 2019-01-22 PROBLEM — S01.01XA SCALP LACERATION: Status: ACTIVE | Noted: 2019-01-22

## 2019-01-22 PROCEDURE — 99217 PR OBSERVATION CARE DISCHARGE MANAGEMENT: CPT | Performed by: NURSE PRACTITIONER

## 2019-01-22 PROCEDURE — 99222 1ST HOSP IP/OBS MODERATE 55: CPT | Performed by: INTERNAL MEDICINE

## 2019-01-22 PROCEDURE — 94660 CPAP INITIATION&MGMT: CPT

## 2019-01-22 PROCEDURE — 94799 UNLISTED PULMONARY SVC/PX: CPT

## 2019-01-22 PROCEDURE — 97165 OT EVAL LOW COMPLEX 30 MIN: CPT

## 2019-01-22 PROCEDURE — 99222 1ST HOSP IP/OBS MODERATE 55: CPT | Performed by: NURSE PRACTITIONER

## 2019-01-22 PROCEDURE — G0378 HOSPITAL OBSERVATION PER HR: HCPCS

## 2019-01-22 RX ORDER — PREDNISONE 10 MG/1
5 TABLET ORAL
Status: DISCONTINUED | OUTPATIENT
Start: 2019-01-23 | End: 2019-01-23

## 2019-01-22 RX ORDER — TRAMADOL HYDROCHLORIDE 50 MG/1
50 TABLET ORAL EVERY 8 HOURS PRN
Status: DISCONTINUED | OUTPATIENT
Start: 2019-01-22 | End: 2019-02-05 | Stop reason: HOSPADM

## 2019-01-22 RX ORDER — NITROGLYCERIN 0.4 MG/1
0.4 TABLET SUBLINGUAL
Status: DISCONTINUED | OUTPATIENT
Start: 2019-01-22 | End: 2019-02-05 | Stop reason: HOSPADM

## 2019-01-22 RX ORDER — ASPIRIN 81 MG/1
81 TABLET ORAL DAILY
Status: DISCONTINUED | OUTPATIENT
Start: 2019-01-22 | End: 2019-01-22

## 2019-01-22 RX ORDER — ESCITALOPRAM OXALATE 10 MG/1
10 TABLET ORAL DAILY
Status: DISCONTINUED | OUTPATIENT
Start: 2019-01-22 | End: 2019-01-22

## 2019-01-22 RX ORDER — ASPIRIN 81 MG/1
81 TABLET ORAL DAILY
Status: DISCONTINUED | OUTPATIENT
Start: 2019-01-23 | End: 2019-02-05 | Stop reason: HOSPADM

## 2019-01-22 RX ORDER — BUDESONIDE AND FORMOTEROL FUMARATE DIHYDRATE 80; 4.5 UG/1; UG/1
1 AEROSOL RESPIRATORY (INHALATION) DAILY
Status: DISCONTINUED | OUTPATIENT
Start: 2019-01-22 | End: 2019-02-04

## 2019-01-22 RX ORDER — TAMSULOSIN HYDROCHLORIDE 0.4 MG/1
0.4 CAPSULE ORAL NIGHTLY
Status: DISCONTINUED | OUTPATIENT
Start: 2019-01-22 | End: 2019-02-05 | Stop reason: HOSPADM

## 2019-01-22 RX ORDER — SPIRONOLACTONE 25 MG/1
25 TABLET ORAL DAILY
Status: DISCONTINUED | OUTPATIENT
Start: 2019-01-22 | End: 2019-02-05 | Stop reason: HOSPADM

## 2019-01-22 RX ORDER — ALPRAZOLAM 0.5 MG/1
0.5 TABLET ORAL NIGHTLY PRN
Status: DISCONTINUED | OUTPATIENT
Start: 2019-01-22 | End: 2019-02-05 | Stop reason: HOSPADM

## 2019-01-22 RX ORDER — PANTOPRAZOLE SODIUM 40 MG/10ML
40 INJECTION, POWDER, LYOPHILIZED, FOR SOLUTION INTRAVENOUS
Status: DISCONTINUED | OUTPATIENT
Start: 2019-01-22 | End: 2019-01-30

## 2019-01-22 RX ORDER — BACLOFEN 10 MG/1
10 TABLET ORAL 3 TIMES DAILY
Status: DISCONTINUED | OUTPATIENT
Start: 2019-01-22 | End: 2019-01-22

## 2019-01-22 RX ORDER — FINASTERIDE 5 MG/1
5 TABLET, FILM COATED ORAL DAILY
Status: DISCONTINUED | OUTPATIENT
Start: 2019-01-22 | End: 2019-02-05 | Stop reason: HOSPADM

## 2019-01-22 RX ORDER — CLOPIDOGREL BISULFATE 75 MG/1
75 TABLET ORAL DAILY
Status: DISCONTINUED | OUTPATIENT
Start: 2019-01-22 | End: 2019-02-05 | Stop reason: HOSPADM

## 2019-01-22 RX ORDER — OXYBUTYNIN CHLORIDE 5 MG/1
5 TABLET ORAL NIGHTLY
Status: DISCONTINUED | OUTPATIENT
Start: 2019-01-22 | End: 2019-02-05 | Stop reason: HOSPADM

## 2019-01-22 RX ORDER — BACLOFEN 10 MG/1
10 TABLET ORAL 3 TIMES DAILY PRN
Status: DISCONTINUED | OUTPATIENT
Start: 2019-01-22 | End: 2019-02-05 | Stop reason: HOSPADM

## 2019-01-22 RX ORDER — FUROSEMIDE 20 MG/1
20 TABLET ORAL DAILY
Status: DISCONTINUED | OUTPATIENT
Start: 2019-01-22 | End: 2019-01-24

## 2019-01-22 RX ORDER — FLUTICASONE PROPIONATE 50 MCG
1 SPRAY, SUSPENSION (ML) NASAL DAILY PRN
Status: DISCONTINUED | OUTPATIENT
Start: 2019-01-22 | End: 2019-02-05 | Stop reason: HOSPADM

## 2019-01-22 RX ORDER — ESCITALOPRAM OXALATE 10 MG/1
10 TABLET ORAL DAILY
Status: DISCONTINUED | OUTPATIENT
Start: 2019-01-23 | End: 2019-02-05 | Stop reason: HOSPADM

## 2019-01-22 RX ORDER — ATORVASTATIN CALCIUM 40 MG/1
40 TABLET, FILM COATED ORAL NIGHTLY
Status: DISCONTINUED | OUTPATIENT
Start: 2019-01-22 | End: 2019-01-28

## 2019-01-22 RX ORDER — ALBUTEROL SULFATE 2.5 MG/3ML
2.5 SOLUTION RESPIRATORY (INHALATION) EVERY 4 HOURS PRN
Status: DISCONTINUED | OUTPATIENT
Start: 2019-01-22 | End: 2019-02-05 | Stop reason: HOSPADM

## 2019-01-22 RX ADMIN — BUDESONIDE AND FORMOTEROL FUMARATE DIHYDRATE 2 PUFF: 80; 4.5 AEROSOL RESPIRATORY (INHALATION) at 07:36

## 2019-01-22 RX ADMIN — ESCITALOPRAM OXALATE 10 MG: 10 TABLET ORAL at 09:09

## 2019-01-22 RX ADMIN — SPIRONOLACTONE 25 MG: 25 TABLET ORAL at 16:25

## 2019-01-22 RX ADMIN — PANTOPRAZOLE SODIUM 40 MG: 40 INJECTION, POWDER, FOR SOLUTION INTRAVENOUS at 18:25

## 2019-01-22 RX ADMIN — TAMSULOSIN HYDROCHLORIDE 0.4 MG: 0.4 CAPSULE ORAL at 20:09

## 2019-01-22 RX ADMIN — DOCUSATE SODIUM 100 MG: 100 CAPSULE, LIQUID FILLED ORAL at 09:09

## 2019-01-22 RX ADMIN — ASPIRIN 81 MG: 81 TABLET, COATED ORAL at 09:09

## 2019-01-22 RX ADMIN — METOPROLOL TARTRATE 25 MG: 25 TABLET ORAL at 20:08

## 2019-01-22 RX ADMIN — OXYBUTYNIN CHLORIDE 5 MG: 5 TABLET ORAL at 20:09

## 2019-01-22 RX ADMIN — BACLOFEN 10 MG: 10 TABLET ORAL at 16:25

## 2019-01-22 RX ADMIN — TRAMADOL HYDROCHLORIDE 50 MG: 50 TABLET, FILM COATED ORAL at 15:12

## 2019-01-22 RX ADMIN — IPRATROPIUM BROMIDE AND ALBUTEROL SULFATE 3 ML: .5; 3 SOLUTION RESPIRATORY (INHALATION) at 07:17

## 2019-01-22 RX ADMIN — TOBRAMYCIN 2 DROP: 3 SOLUTION OPHTHALMIC at 00:09

## 2019-01-22 RX ADMIN — SODIUM CHLORIDE, PRESERVATIVE FREE 3 ML: 5 INJECTION INTRAVENOUS at 09:09

## 2019-01-22 RX ADMIN — CLOPIDOGREL BISULFATE 75 MG: 75 TABLET, FILM COATED ORAL at 16:24

## 2019-01-22 RX ADMIN — ALPRAZOLAM 0.5 MG: 0.5 TABLET ORAL at 00:09

## 2019-01-22 RX ADMIN — ALPRAZOLAM 0.5 MG: 0.5 TABLET ORAL at 23:11

## 2019-01-22 RX ADMIN — FINASTERIDE 5 MG: 5 TABLET, FILM COATED ORAL at 16:25

## 2019-01-22 RX ADMIN — IPRATROPIUM BROMIDE AND ALBUTEROL SULFATE 3 ML: .5; 3 SOLUTION RESPIRATORY (INHALATION) at 01:40

## 2019-01-22 RX ADMIN — ATORVASTATIN CALCIUM 40 MG: 40 TABLET, FILM COATED ORAL at 20:08

## 2019-01-22 RX ADMIN — HYDROCODONE BITARTRATE AND ACETAMINOPHEN 1 TABLET: 5; 325 TABLET ORAL at 09:18

## 2019-01-22 RX ADMIN — TOBRAMYCIN 2 DROP: 3 SOLUTION OPHTHALMIC at 09:00

## 2019-01-22 RX ADMIN — FUROSEMIDE 20 MG: 20 TABLET ORAL at 16:24

## 2019-01-22 RX ADMIN — ATORVASTATIN CALCIUM 40 MG: 40 TABLET, FILM COATED ORAL at 09:09

## 2019-01-22 RX ADMIN — METOPROLOL TARTRATE 25 MG: 25 TABLET ORAL at 09:09

## 2019-01-22 RX ADMIN — CALCIUM CARBONATE-VITAMIN D TAB 500 MG-200 UNIT 500 MG: 500-200 TAB at 09:09

## 2019-01-22 NOTE — NURSING NOTE
Patient transferred to [facility] for higher level of care with cardiologist. Report given to Melissa Adhikari RN

## 2019-01-22 NOTE — THERAPY DISCHARGE NOTE
Acute Care - Occupational Therapy Initial Eval/Discharge   [facility]     Patient Name: Jani Pena  : 1950  MRN: [id number]  Today's Date: 2019  Onset of Illness/Injury or Date of Surgery: 19  Date of Referral to OT: 19  Referring Physician: PAL Stern      Admit Date: 2019       ICD-10-CM ICD-9-CM   1. Syncope and collapse R55 780.2   2. Elevated troponin R74.8 790.6   3. Impaired mobility and ADLs Z74.09 799.89     Patient Active Problem List   Diagnosis   • Anxiety   • Atherosclerotic heart disease of native coronary artery without angina pectoris   • Atrial fibrillation (CMS/HCC)   • Chronic kidney disease, stage III (moderate) (CMS/HCC)   • Steroid-dependent COPD (CMS/HCC)   • Degeneration of cervical intervertebral disc   • Diabetes mellitus (CMS/HCC)   • GERD without esophagitis   • Diverticulosis   • Hemorrhoids   • Hiatal hernia   • Hyperlipidemia   • Hypertension   • Kyphosis, acquired   • Mitral and aortic valve disease   • Sleep apnea   • Enlarged prostate without lower urinary tract symptoms (luts)   • History of arthritis   • Back pain   • Depression   • Stroke syndrome   • History of ventricular septal defect   • Neck pain   • Impaired mobility and ADLs   • Physical deconditioning   • Chronic systolic congestive heart failure (CMS/HCC)   • Atherosclerosis of native coronary artery of native heart with unstable angina pectoris (CMS/HCC)   • Lumbar radiculopathy   • Dysphagia   • Heartburn   • Anemia   • Syncope and collapse     Past Medical History:   Diagnosis Date   • Abnormal liver enzymes    • Anemia    • Anxiety    • Arthritis    • Atherosclerotic heart disease of native coronary artery without angina pectoris    • Atrial fibrillation (CMS/HCC)    • Back pain    • BPH (benign prostatic hyperplasia)    • Cataract, bilateral    • Chest pain     2-3 MONTHS AGO.  PER PT, HAS ADDRESSED WITH CARDIOLOGIST.  STATES HAS NTG PRN.   • CHF (congestive heart  failure) (CMS/[facility])    • Chronic bronchitis (CMS/HCC)    • Chronic kidney disease    • COPD (chronic obstructive pulmonary disease) (CMS/[facility])    • Degeneration of cervical intervertebral disc    • Depression    • Diverticulosis    • Dyspnea    • Esophageal reflux    • Esophagitis    • Gastritis    • Hematuria    • Hemorrhoids    • Hiatal hernia    • History of arthritis    • History of nuclear stress test     UNSURE OF DATE   • History of peripheral neuropathy    • History of ventricular septal defect    • History of ventricular septal defect    • Hyperlipidemia    • Hypertension    • Infectious diarrhea    • Kyphosis, acquired    • Lumbar radiculopathy    • Mitral and aortic valve disease    • Nephrolithiasis    • Phimosis    • Problems with swallowing     WITH FOOD   • Respiratory failure (CMS/[facility])    • Sleep apnea     BIPAP   • TIA (transient ischemic attack)     X3.   2014   • Ventral hernia    • Wears glasses     FOR READING     Past Surgical History:   Procedure Laterality Date   • CARDIAC CATHETERIZATION     • CARDIAC CATHETERIZATION N/A 5/24/2018    Procedure: Left Heart Cath;  Surgeon: Edouard Robertson MD;  Location: [facility] CATH INVASIVE LOCATION;  Service: Cardiovascular   • HERNIA REPAIR     • ORTHOPEDIC SURGERY Left     Left hip arthroplasty   • SUPRAPUBIC CATHETER INSERTION      History of Percutaneous Catheter Placement Into Ureter   • THROAT SURGERY      FOR SLEEP APNEA   • VSD REPAIR      History of VSD Repair By Patch Closure          OT ASSESSMENT FLOWSHEET (last 72 hours)      Occupational Therapy Evaluation     Row Name 01/22/19 1058                   OT Evaluation Time/Intention    Subjective Information  complains of;weakness  -SD        Document Type  discharge evaluation/summary  -SD        Mode of Treatment  occupational therapy  -SD        Patient Effort  good  -SD        Symptoms Noted During/After Treatment  fatigue  -SD           General Information    Patient Profile Reviewed?  yes  -SD         Onset of Illness/Injury or Date of Surgery  01/20/19  -SD        Referring Physician  PAL Stern  -SD        Patient Observations  alert;cooperative;agree to therapy  -SD        General Observations of Patient  Supine, on 4L o2  -SD        Prior Level of Function  independent:;all household mobility  -SD        Equipment Currently Used at Home  commode, bedside;cane, straight;bipap/ cpap;oxygen;shower chair;walker, rolling  -SD        Pertinent History of Current Functional Problem  Syncope and collapse; anxiety, atherosclerotic heart disease, CKD, DM, GERD, HLD, HTN, depression  -SD        Existing Precautions/Restrictions  fall;oxygen therapy device and L/min  -SD        Risks Reviewed  patient:;increased discomfort  -SD        Benefits Reviewed  patient:;improve function;increase independence;increase strength;increase balance  -SD        Barriers to Rehab  none identified  -SD           Relationship/Environment    Primary Source of Support/Comfort  child(miguel)  -SD        Lives With  alone  -SD           Resource/Environmental Concerns    Current Living Arrangements  home/apartment/condo  -SD        Transportation Concerns  car, none  -SD           Home Main Entrance    Number of Stairs, Main Entrance  one  -SD           Cognitive Assessment/Interventions    Additional Documentation  Cognitive Assessment/Intervention (Group)  -SD           Cognitive Assessment/Intervention- PT/OT    Orientation Status (Cognition)  oriented x 4  -SD        Follows Commands (Cognition)  verbal cues/prompting required  -SD        Safety Deficit (Cognitive)  safety precautions follow-through/compliance  -SD        Personal Safety Interventions  fall prevention program maintained;gait belt;nonskid shoes/slippers when out of bed  -SD           Safety Issues, Functional Mobility    Safety Issues Affecting Function (Mobility)  safety precautions follow-through/compliance  -SD        Impairments Affecting Function  (Mobility)  endurance/activity tolerance;shortness of breath;strength;balance  -SD           Bed Mobility Assessment/Treatment    Bed Mobility Assessment/Treatment  supine-sit;sit-supine  -SD        Supine-Sit [place] (Bed Mobility)  supervision  -SD        Sit-Supine [place] (Bed Mobility)  supervision  -SD        Assistive Device (Bed Mobility)  bed rails;head of bed elevated  -SD           Functional Mobility    Functional Mobility- Ind. Level  contact guard assist  -SD        Functional Mobility- Device  rolling walker  -SD        Functional Mobility-Distance (Feet)  92  -SD        Functional Mobility- Safety Issues  balance decreased during turns;supplemental O2  -SD           Transfer Assessment/Treatment    Transfer Assessment/Treatment  sit-stand transfer;stand-sit transfer  -SD           Sit-Stand Transfer    Sit-Stand [place] (Transfers)  contact guard  -SD        Assistive Device (Sit-Stand Transfers)  walker, front-wheeled  -SD           Stand-Sit Transfer    Stand-Sit [place] (Transfers)  contact guard  -SD        Assistive Device (Stand-Sit Transfers)  walker, front-wheeled  -SD           ADL Assessment/Intervention    BADL Assessment/Intervention  bathing;upper body dressing;lower body dressing;grooming;feeding;toileting  -SD           Bathing Assessment/Intervention    Bathing [place] Level  minimum assist (75% patient effort)  -SD           Upper Body Dressing Assessment/Training    Upper Body Dressing [place] Level  set up  -SD           Lower Body Dressing Assessment/Training    Lower Body Dressing [place] Level  minimum assist (75% patient effort)  -SD           Grooming Assessment/Training    [place] Level (Grooming)  set up  -SD           Self-Feeding Assessment/Training    [place] Level (Feeding)  set up  -SD           Toileting Assessment/Training    [place] Level (Toileting)  minimum assist (75% patient effort)  -SD           BADL  Safety/Performance    Impairments, BADL Safety/Performance  balance;endurance/activity tolerance;shortness of breath;strength  -SD           General ROM    GENERAL ROM COMMENTS  WFL  -SD           MMT (Manual Muscle Testing)    General MMT Comments  3+/5  -SD           Positioning and Restraints    Pre-Treatment Position  in bed  -SD        Post Treatment Position  bed  -SD        In Bed  supine;call light within reach;encouraged to call for assist  -SD           Pain Assessment    Additional Documentation  Pain Scale: Numbers Pre/Post-Treatment (Group)  -SD           Pain Scale: Numbers Pre/Post-Treatment    Pain Scale: Numbers, Pretreatment  7/10  -SD        Pain Scale: Numbers, Post-Treatment  7/10  -SD        Pain Location  head  -SD           Wound 01/21/19 2000 Left posterior head laceration    Wound - Properties Group Date first assessed: 01/21/19  -PAUL Time first assessed: 2000 -[facility] Present On Admission : yes  -PAUL Side: Left  -PAUL Orientation: posterior  -PAUL Location: head  -PAUL Type: laceration  -PAUL Number of Staples Placed: 6  -PAUL       Coping    Observed Emotional State  calm;cooperative  -SD        Verbalized Emotional State  acceptance  -SD           Plan of Care Review    Plan of Care Reviewed With  patient  -SD           Clinical Impression (OT)    Date of Referral to OT  01/21/19  -SD        OT Diagnosis  ADL decline  -SD        Patient/Family Goals Statement (OT Eval)  Increase strength and mobility  -SD        Criteria for Skilled Therapeutic Interventions Met (OT Eval)  yes  -SD        Therapy Frequency (OT Eval)  evaluation only  -SD        Care Plan Review (OT)  evaluation/treatment results reviewed  -SD        Anticipated Discharge Disposition (OT)  anticipate therapy at next level of care  -SD           Vital Signs    Pre SpO2 (%)  94  -SD        O2 Delivery Pre Treatment  supplemental O2  -SD        Intra SpO2 (%)  92  -SD        O2 Delivery Intra Treatment  supplemental O2  -SD        Post SpO2  (%)  92  -SD        O2 Delivery Post Treatment  supplemental O2  -SD           Discharge Summary (Occupational Therapy)    Additional Documentation  Discharge Summary, OT Eval (Group)  -SD           Discharge Summary, OT Eval    Reason for Discharge (OT Discharge Summary)  patient discharged from this facility  -SD        Transfer to Another Level of Care or Facility (OT Discharge Summary)  -- Transfer to external hospital  -SD           Living Environment    Home Accessibility  stairs to enter home  -SD          User Key  (r) = Recorded By, (t) = Taken By, (c) = Cosigned By    Initials Name Effective Dates    Cyn Leo RN 02/22/17 -     SD Kelle Edwards OT 03/07/18 -           Occupational Therapy Education     Title: PT OT SLP Therapies (Not Started)     Topic: Occupational Therapy (Resolved)     Point: ADL training (Resolved)     Description: Instruct learner(s) on proper safety adaptation and remediation techniques during self care or transfers.   Instruct in proper use of assistive devices.    Learning Progress Summary           Patient Acceptance, E,TB, VU by SD at 1/22/2019  1:15 PM    Comment:  Benefit of OT services.                               User Key     Initials Effective Dates Name Provider Type Discipline    SD 03/07/18 -  Kelle Edwards OT Occupational Therapist OT                OT Recommendation and Plan  Outcome Summary/Treatment Plan (OT)  Anticipated Discharge Disposition (OT): anticipate therapy at next level of care  Reason for Discharge (OT Discharge Summary): patient discharged from this facility  Therapy Frequency (OT Eval): evaluation only  Plan of Care Review  Plan of Care Reviewed With: patient  Plan of Care Reviewed With: patient  Outcome Summary: OT eval completed. Patient completed bed mobility with sup, transfers and mobility 92' using RW with CGA. Requires min A for LB self care tasks. Patient DC'd to outside hospital for cardiac care.          Outcome Measures      Row Name 01/22/19 1058             How much help from another is currently needed...    Putting on and taking off regular lower body clothing?  3  -SD      Bathing (including washing, rinsing, and drying)  3  -SD      Toileting (which includes using toilet bed pan or urinal)  3  -SD      Putting on and taking off regular upper body clothing  4  -SD      Taking care of personal grooming (such as brushing teeth)  4  -SD      Eating meals  4  -SD      Score  21  -SD         Functional Assessment    Outcome Measure Options  AM-PAC 6 Clicks Daily Activity (OT)  -SD        User Key  (r) = Recorded By, (t) = Taken By, (c) = Cosigned By    Initials Name Provider Type    Kelle Taylor OT Occupational Therapist          Time Calculation:   Time Calculation- OT     Row Name 01/22/19 1317             Time Calculation- OT    OT Start Time  1058  -SD      OT Received On  01/22/19  -SD        User Key  (r) = Recorded By, (t) = Taken By, (c) = Cosigned By    Initials Name Provider Type    Kelle Taylor OT Occupational Therapist        Therapy Suggested Charges     Code   Minutes Charges    None           Therapy Charges for Today     Code Description Service Date Service Provider Modifiers Qty    [id number]  OT EVAL LOW COMPLEXITY 3 1/22/2019 Kelle Edwards OT GO 1               OT Discharge Summary  Anticipated Discharge Disposition (OT): anticipate therapy at next level of care    Kelle Edwards OT  1/22/2019

## 2019-01-22 NOTE — PLAN OF CARE
Problem: Patient Care Overview  Goal: Plan of Care Review  Outcome: Ongoing (interventions implemented as appropriate)   01/22/19 0512   Coping/Psychosocial   Plan of Care Reviewed With patient   Plan of Care Review   Progress no change   OTHER   Outcome Summary VSS. Patient on 4L nasal cannula. He did not wear the bipap during shift. SR with BBP on telemetry. Given PRN xanax for anxiety. Patient complained of eye redness, itching, and drainage. Antibiotic eyedrops were ordered. Given pain meds for throbbing pain at posterior head laceration site. No drainage noted.        Problem: Fall Risk (Adult)  Goal: Identify Related Risk Factors and Signs and Symptoms  Outcome: Outcome(s) achieved Date Met: 01/22/19 01/21/19 0458   Fall Risk (Adult)   Related Risk Factors (Fall Risk) environment unfamiliar;history of falls;fatigue/slow reaction;gait/mobility problems   Signs and Symptoms (Fall Risk) presence of risk factors     Goal: Absence of Fall  Outcome: Ongoing (interventions implemented as appropriate)   01/22/19 0512   Fall Risk (Adult)   Absence of Fall making progress toward outcome       Problem: Syncope (Adult)  Goal: Physical Safety/Health Maintenance  Outcome: Ongoing (interventions implemented as appropriate)   01/22/19 0512   Syncope (Adult)   Physical Safety/Health Maintenance making progress toward outcome     Goal: Optimal Emotional/Functional [place]  Outcome: Ongoing (interventions implemented as appropriate)   01/22/19 0512   Syncope (Adult)   Optimal Emotional/Functional [place] making progress toward outcome

## 2019-01-22 NOTE — DISCHARGE SUMMARY
[facility]   [facility]    Name:  Jani Pena   Age:  68 y.o.  Sex:  male  :  1950  MRN:  [id number]   Visit Number:  [id number]  Primary Care Physician:  Miguel Rojas MD  Date of Discharge:  2019  Admission Date:  2019      Presenting Problem:    Syncope and collapse [R55]       Discharge Diagnosis:       Syncope and collapse    Anxiety    Atherosclerotic heart disease of native coronary artery without angina pectoris    Chronic kidney disease, stage III (moderate) (CMS/HCC)    Steroid-dependent COPD (CMS/HCC)    Diabetes mellitus (CMS/HCC)    GERD without esophagitis    Hyperlipidemia    Hypertension    Mitral and aortic valve disease    Sleep apnea    Depression    History of ventricular septal defect        Past Medical History:  Past Medical History:   Diagnosis Date   • Abnormal liver enzymes    • Anemia    • Anxiety    • Arthritis    • Atherosclerotic heart disease of native coronary artery without angina pectoris    • Atrial fibrillation (CMS/HCC)    • Back pain    • BPH (benign prostatic hyperplasia)    • Cataract, bilateral    • Chest pain     2-3 MONTHS AGO.  PER PT, HAS ADDRESSED WITH CARDIOLOGIST.  STATES HAS NTG PRN.   • CHF (congestive heart failure) (CMS/HCC)    • Chronic bronchitis (CMS/HCC)    • Chronic kidney disease    • COPD (chronic obstructive pulmonary disease) (CMS/HCC)    • Degeneration of cervical intervertebral disc    • Depression    • Diverticulosis    • Dyspnea    • Esophageal reflux    • Esophagitis    • Gastritis    • Hematuria    • Hemorrhoids    • Hiatal hernia    • History of arthritis    • History of nuclear stress test     UNSURE OF DATE   • History of peripheral neuropathy    • History of ventricular septal defect    • History of ventricular septal defect    • Hyperlipidemia    • Hypertension    • Infectious diarrhea    • Kyphosis, acquired    • Lumbar radiculopathy    • Mitral and aortic valve disease    •  Nephrolithiasis    • Phimosis    • Problems with swallowing     WITH FOOD   • Respiratory failure (CMS/HCC)    • Sleep apnea     BIPAP   • TIA (transient ischemic attack)     X3.   2014   • Ventral hernia    • Wears glasses     FOR READING         Consults:     Consults     Date and Time Order Name Status Description    1/21/2019 0113 Inpatient Cardiology Consult            Procedures Performed:  None             History of presenting illness/Hospital Course:  This is a 68 year old male with multiple medical problems who was brought into the ER last evening for a syncopial episode.  He had gotten up to go to kitchen and does not remember anything until he woke up on the floor bleeding from his head.  He does have a [employer] implantable pulse generator placed on 2016 by Dr. Ngo at .  Upon admission cardiology was consulted.  He was seen by cardiology Dr. Acosta who ordered Echo for further evaluation.  His echo showed  EF 45%, mild mitral regurgitation, mild tricuspid regurgitation, mild dilation of the RV, severe hypokinesis of the inferior and septal walls.   There have been no syncopal events since admission.  Last night he was noted however to have nonsustained ventricle tachycardia.  He does have borderline elevated troponin.  He has had a heart cath within the last 1-2 years for which he did not have any significant CAD.  Given his ventricular tachycardia, Dr. Acosta did discuss with patients primary cardiology Dr. Bass who is agreeable to transfer patient to [place] for evaluation and therapy if needed.  Patient is in agreement with this plan.    Patient does have a hematoma to posterior scalp with staples intact.  He will need to follow up in 10-14 days with PCP for staple removal      Vital Signs:    Temp:  [97.7 °F (36.5 °C)-98.4 °F (36.9 °C)] 97.7 °F (36.5 °C)  Heart Rate:  [69-88] 83  Resp:  [14-18] 18  BP: (100-142)/(53-74) 100/56    Physical Exam:  General Appearance:    Alert and cooperative, not in  any acute distress.   Head:    Atraumatic and normocephalic, without obvious abnormality.  Hematoma to posterior scalp with 5 staples.    Eyes:            PERRLA, conjunctivae and sclerae normal, no Icterus. No pallor. Extra-occular movements are within normal limits.   Ears:    Ears appear intact with no abnormalities noted.   Throat:   No oral lesions, no thrush, oral mucosa moist.   Neck:   Supple, trachea midline, no thyromegaly, no carotid bruit.   Back:     No kyphoscoliosis present. No tenderness to palpation,   range of motion normal.   Lungs:     Chest shape is normal. Breath sounds heard bilaterally equally.  No crackles or wheezing. No Pleural rub or bronchial breathing.    Heart:    Normal S1 and S2, no murmur, no gallop, no rub. No JVD   Abdomen:     Normal bowel sounds, no masses, no organomegaly. Soft        non-tender, non-distended, no guarding, no rebound                tenderness   Extremities:   Moves all extremities well, no edema, no cyanosis, no             Clubbing, bruising to extremities.     Pulses:   Pulses palpable and equal bilaterally   Skin:   No bleeding, bruising or rash   Lymph nodes:  Psychiatric/Behavior:    No palpable adenopathy    Normal mood, normal behavior   Neurologic:   Cranial nerves 2 - 12 grossly intact, sensation intact, Motor power is normal and equal bilaterally.           Pertinent Lab Results:     Results from last 7 days   Lab Units 01/21/19  0532 01/20/19  1757   SODIUM mmol/L 139 139   POTASSIUM mmol/L 3.9 4.8   CHLORIDE mmol/L 98 99   CO2 mmol/L 38.0* 34.0*   BUN mg/dL 44* 50*   CREATININE mg/dL 1.40* 1.40*   CALCIUM mg/dL 9.1 9.6   BILIRUBIN mg/dL 1.3 1.5*   ALK PHOS U/L 58 68   ALT (SGPT) U/L 27 25   AST (SGOT) U/L 37 45   GLUCOSE mg/dL 100* 101*     Results from last 7 days   Lab Units 01/21/19  0532 01/20/19  1757   WBC 10*3/mm3 8.93 7.91   HEMOGLOBIN g/dL 10.5* 11.8*   HEMATOCRIT % 33.4* 36.2*   PLATELETS 10*3/mm3 170 178                Pertinent  Radiology Results:    Imaging Results (all)     Procedure Component Value Units Date/Time    CT Head Without Contrast [[id number]] Collected:  01/21/19 1002     Updated:  01/21/19 1006    Narrative:       PROCEDURE: CT HEAD WO CONTRAST-     HISTORY: Syncope/fainting     COMPARISON: January 20, 2019.     TECHNIQUE: Multiple axial CT images were performed from the foramen  magnum to the vertex without enhancement.      FINDINGS: There is no CT evidence of hemorrhage. There is no mass, mass  effect or midline shift.  There is no hydrocephalus. The paranasal  sinuses are clear. Bone windows reveal no acute osseous abnormalities.  Note is made of a scalp hematoma in the left occipital region.       Impression:       No acute intracranial process.             1022.51 mGy.cm          This study was performed with techniques to keep radiation doses as low  as reasonably achievable (ALARA). Individualized dose reduction  techniques using automated exposure control or adjustment of mA and/or  kV according to the patient size were employed.      This report was finalized on 1/21/2019 10:04 AM by Richelle Patel M.D..    XR Chest 1 View [[id number]] Collected:  01/21/19 0726     Updated:  01/21/19 0730    Narrative:       PROCEDURE: XR CHEST 1 VW-     HISTORY: COPD     COMPARISON: September 22, 2018.     FINDINGS: The heart is normal in size. The mediastinum is unremarkable.  There are low lung volumes with bibasilar atelectasis. The lungs are  otherwise clear. There is no pneumothorax.  There are no acute osseous  abnormalities.           Impression:       Low lung volumes with bibasilar atelectasis.     Continued followup is recommended.     This report was finalized on 1/21/2019 7:28 AM by Richelle Patel M.D..    XR Knee 3 View Left [[id number]] Collected:  01/21/19 0723     Updated:  01/21/19 0728    Narrative:       PROCEDURE: XR KNEE 3 VW LEFT-     History: fall, pain     COMPARISON: None.     FINDINGS:  A 3 view  exam demonstrates no acute fracture or dislocation.  There are tricompartmental degenerative changes with joint space  narrowing and osteophyte formation. Soft tissue swelling is noted  anteriorly.           Impression:       No acute fracture.                 This report was finalized on 1/21/2019 7:24 AM by Richelle Patel M.D..    CT Cervical Spine Without Contrast [[id number]] Collected:  01/20/19 1916     Updated:  01/20/19 1917    Narrative:       FINAL REPORT    TECHNIQUE:  Thin section axial images were obtained through the cervical  spine without contrast.  Coronal and sagittal reconstructed  images were then provided.    CLINICAL HISTORY:  fall    FINDINGS:  Bilateral vagus nerve stimulators are seen.  The prevertebral  soft tissues are normal. There is normal alignment and  curvature.   There is no fracture.  There are mild changes of  degenerative disc disease.      Impression:       No acute abnormality.    Authenticated by Savage Huynh M.D. on 01/20/2019 07:16:16 PM    CT Head Without Contrast [[id number]] Collected:  01/20/19 1915     Updated:  01/20/19 1916    Narrative:       FINAL REPORT    TECHNIQUE:  Routine axial images through the head were obtained without  contrast.    CLINICAL HISTORY:  . syncope, fall    FINDINGS:  The ventricles are normal.  There is no mass or other abnormal  hypodensity.  There is no shift of midline structures.  There is  no intracranial hemorrhage.  No significant osseous or sinus  abnormality is seen.      Impression:       Unremarkable.    Authenticated by Svaage Huynh M.D. on 01/20/2019 07:15:09 PM          Condition on Discharge:    Stable        Discharge Disposition:    Short Term Hospital (DC - External)    Discharge Medication:       Discharge Medications      Changes to Medications      Instructions Start Date   baclofen 10 MG tablet  Commonly known as:  LIORESAL  What changed:    · how much to take  · how to take this  · when to take this   TAKE ONE TABLET BY  MOUTH THREE TIMES A DAY      baclofen 10 MG tablet  Commonly known as:  LIORESAL  What changed:  Another medication with the same name was changed. Make sure you understand how and when to take each.   TAKE ONE TABLET BY MOUTH THREE TIMES A DAY      baclofen 10 MG tablet  Commonly known as:  LIORESAL  What changed:  Another medication with the same name was changed. Make sure you understand how and when to take each.   TAKE ONE TABLET BY MOUTH THREE TIMES A DAY      fluticasone 50 MCG/ACT nasal spray  Commonly known as:  FLONASE  What changed:    · when to take this  · reasons to take this   1 spray, Nasal, Daily      spironolactone 25 MG tablet  Commonly known as:  ALDACTONE  What changed:    · how much to take  · how to take this  · when to take this   TAKE ONE TABLET BY MOUTH DAILY         Continue These Medications      Instructions Start Date   alfuzosin 10 MG 24 hr tablet  Commonly known as:  UROXATRAL   TAKE ONE TABLET BY MOUTH DAILY      ALPRAZolam 0.5 MG tablet  Commonly known as:  XANAX   TAKE ONE TABLET BY MOUTH ONCE NIGHTLY AS NEEDED FOR ANXIETY      aspirin 81 MG EC tablet   81 mg, Oral, Daily      atorvastatin 40 MG tablet  Commonly known as:  LIPITOR   TAKE ONE TABLET BY MOUTH EVERY NIGHT AT BEDTIME      BREO ELLIPTA 200-25 MCG/INH aerosol powder  inhaler  Generic drug:  Fluticasone Furoate-Vilanterol   1 each, Inhalation, Daily      budesonide 0.5 MG/2ML nebulizer solution  Commonly known as:  PULMICORT   0.5 mg, Nebulization, 2 Times Daily - RT      calcium carbonate 600 MG tablet  Commonly known as:  OS-HAFSA   600 mg, Oral, 2 Times Daily With Meals      clopidogrel 75 MG tablet  Commonly known as:  PLAVIX   TAKE ONE TABLET BY MOUTH DAILY      escitalopram 10 MG tablet  Commonly known as:  LEXAPRO   10 mg, Oral, Daily      finasteride 5 MG tablet  Commonly known as:  PROSCAR   5 mg, Oral, Daily      furosemide 20 MG tablet  Commonly known as:  LASIX   TAKE ONE TABLET BY MOUTH DAILY      furosemide 20  MG tablet  Commonly known as:  LASIX   TAKE ONE TABLET BY MOUTH DAILY      furosemide 20 MG tablet  Commonly known as:  LASIX   TAKE ONE TABLET BY MOUTH DAILY      furosemide 20 MG tablet  Commonly known as:  LASIX   TAKE ONE TABLET BY MOUTH DAILY      ipratropium-albuterol 0.5-2.5 mg/3 ml nebulizer  Commonly known as:  DUO-NEB   3 mL, Nebulization, Every 6 Hours - RT      iron polysaccharides 150 MG capsule  Commonly known as:  NIFEREX   150 mg, Oral, 2 Times Daily      metoprolol tartrate 25 MG tablet  Commonly known as:  LOPRESSOR   25 mg, Oral, Every 12 Hours Scheduled      mirtazapine 15 MG tablet  Commonly known as:  REMERON   15 mg, Oral, Nightly      multivitamin with minerals tablet tablet   1 tablet, Oral, Daily      NITROSTAT 0.4 MG SL tablet  Generic drug:  nitroglycerin   0.4 mg, Sublingual, Every 5 Minutes PRN      O2  Commonly known as:  OXYGEN   4 L/min, Inhalation, Once, Walking on 4L and sitting 3L      oxybutynin 5 MG tablet  Commonly known as:  DITROPAN   TAKE ONE TABLET BY MOUTH EVERY NIGHT AT BEDTIME      predniSONE 20 MG tablet  Commonly known as:  DELTASONE   3 tabs daily for 5 days      predniSONE 10 MG tablet  Commonly known as:  DELTASONE   TAKE ONE TABLET BY MOUTH DAILY      albuterol (2.5 MG/3ML) 0.083% nebulizer solution  Commonly known as:  PROVENTIL   2.5 mg, Nebulization, Every 4 Hours PRN      PROAIR  (90 Base) MCG/ACT inhaler  Generic drug:  albuterol sulfate HFA   2 puffs, Inhalation, Every 4 Hours PRN      traMADol 50 MG tablet  Commonly known as:  ULTRAM   TAKE ONE TABLET BY MOUTH EVERY 8 HOURS AS NEEDED FOR MODERATE PAIN                   Follow-up Appointments:    Future Appointments   Date Time Provider Department Center   1/25/2019 10:00 AM Miguel Rojas MD [facility] MR None   3/8/2019 11:30 AM Roosevelt Lowe MD [facility] R[facility] None   4/29/2019 11:15 AM Ulysses Bass MD [facility] JUNIOR None         Test Results Pending at Discharge:           Monse Evans  PAL  01/22/19  10:09 AM

## 2019-01-22 NOTE — PLAN OF CARE
Problem: Patient Care Overview  Goal: Plan of Care Review  Outcome: Ongoing (interventions implemented as appropriate)   01/22/19 1322   Coping/Psychosocial   Plan of Care Reviewed With patient   Plan of Care Review   Progress no change   OTHER   Outcome Summary Patient transferred to [facility] for higher level of care with cardiologist. VSS. SR with pauses on tele. Patient anxious about current life situation. Pain managed per MAR. Report given to Melissa Adhikari RN.

## 2019-01-22 NOTE — PLAN OF CARE
Problem: Patient Care Overview  Goal: Plan of Care Review  Outcome: Ongoing (interventions implemented as appropriate)   01/22/19 1314   Coping/Psychosocial   Plan of Care Reviewed With patient   Plan of Care Review   Progress no change   OTHER   Outcome Summary OT eval completed. Patient completed bed mobility with sup, transfers and mobility 92' using RW with CGA. Requires min A for LB self care tasks. Patient DC'd to outside hospital for cardiac care.

## 2019-01-22 NOTE — PLAN OF CARE
Problem: Patient Care Overview  Goal: Plan of Care Review  Outcome: Ongoing (interventions implemented as appropriate)   01/22/19 1042   Coping/Psychosocial   Plan of Care Reviewed With patient;sibling   OTHER   Outcome Summary In response to a nurse referral I spoke with patient and his brother at his bedside. Patient appears calm and was awake and alert. I listened and provided support as he talked about the symptoms and events that caused his hospitalization. Patient stated that he believes his symptoms may be due to electrical issues with his heart that are causing dizziness. I offered sympathy as the patient talked about the death of his wife two years ago and offered prayer at his request.

## 2019-01-22 NOTE — PROGRESS NOTES
Case Management Discharge Note         Destination - Selection Complete      Service Provider Request Status Selected Services Address Phone Number Fax Number    [facility] [address] [place] [address] [phone] --      Durable Medical Equipment - Selection Complete      Service Provider Request Status Selected Services Address Phone Number Fax Number    [facility] Selected Durable Medical Equipment [address], [facility] [address] [phone]      Dialysis/Infusion      No service has been selected for the patient.      Home Medical Care - Selection Complete      Service Provider Request Status Selected Services Address Phone Number Fax Number    [facility] [address], [facility] [address] [phone]      Community Resources      No service has been selected for the patient.        Ambulance: [facility]

## 2019-01-22 NOTE — PROGRESS NOTES
Case Management Discharge Note         Destination - Selection Complete      Service Provider Request Status Selected Services Address Phone Number Fax Number    [facility] [address] [place] [address] [phone] --      Durable Medical Equipment - Selection Complete      Service Provider Request Status Selected Services Address Phone Number Fax Number    [facility] [address] [address] [phone]       Danielle Lundy 1/22/2019 1607    Supplies oxygen and bipap.                 Dialysis/Infusion      No service has been selected for the patient.      Home Medical Care      No service has been selected for the patient.      Community Resources      No service has been selected for the patient.        Ambulance: [facility]    Final Discharge Disposition Code: 02 - short term [facility] for [facility]

## 2019-01-23 ENCOUNTER — APPOINTMENT (OUTPATIENT)
Dept: GENERAL RADIOLOGY | Facility: HOSPITAL | Age: 69
End: 2019-01-23

## 2019-01-23 LAB
ANION GAP SERPL CALCULATED.3IONS-SCNC: 3 MMOL/L (ref 3–11)
BUN BLD-MCNC: 37 MG/DL (ref 9–23)
BUN/CREAT SERPL: 25.2 (ref 7–25)
CALCIUM SPEC-SCNC: 9.6 MG/DL (ref 8.7–10.4)
CHLORIDE SERPL-SCNC: 97 MMOL/L (ref 99–109)
CO2 SERPL-SCNC: 38 MMOL/L (ref 20–31)
CREAT BLD-MCNC: 1.47 MG/DL (ref 0.6–1.3)
DEPRECATED RDW RBC AUTO: 45.5 FL (ref 37–54)
ERYTHROCYTE [DISTWIDTH] IN BLOOD BY AUTOMATED COUNT: 12.6 % (ref 11.3–14.5)
FERRITIN SERPL-MCNC: 187 NG/ML (ref 22–322)
GFR SERPL CREATININE-BSD FRML MDRD: 48 ML/MIN/1.73
GLUCOSE BLD-MCNC: 82 MG/DL (ref 70–100)
HCT VFR BLD AUTO: 33.1 % (ref 38.9–50.9)
HGB BLD-MCNC: 10.3 G/DL (ref 13.1–17.5)
IRON 24H UR-MRATE: 40 MCG/DL (ref 50–175)
IRON SATN MFR SERPL: 14 % (ref 20–50)
MCH RBC QN AUTO: 30.9 PG (ref 27–31)
MCHC RBC AUTO-ENTMCNC: 31.1 G/DL (ref 32–36)
MCV RBC AUTO: 99.4 FL (ref 80–99)
PLATELET # BLD AUTO: 209 10*3/MM3 (ref 150–450)
PMV BLD AUTO: 9.1 FL (ref 6–12)
POTASSIUM BLD-SCNC: 3.7 MMOL/L (ref 3.5–5.5)
RBC # BLD AUTO: 3.33 10*6/MM3 (ref 4.2–5.76)
SODIUM BLD-SCNC: 138 MMOL/L (ref 132–146)
TIBC SERPL-MCNC: 286 MCG/DL (ref 250–450)
WBC NRBC COR # BLD: 8.2 10*3/MM3 (ref 3.5–10.8)

## 2019-01-23 PROCEDURE — 74240 X-RAY XM UPR GI TRC 1CNTRST: CPT

## 2019-01-23 PROCEDURE — 99232 SBSQ HOSP IP/OBS MODERATE 35: CPT | Performed by: INTERNAL MEDICINE

## 2019-01-23 PROCEDURE — 94640 AIRWAY INHALATION TREATMENT: CPT

## 2019-01-23 PROCEDURE — 74230 X-RAY XM SWLNG FUNCJ C+: CPT

## 2019-01-23 PROCEDURE — 83540 ASSAY OF IRON: CPT | Performed by: NURSE PRACTITIONER

## 2019-01-23 PROCEDURE — 97166 OT EVAL MOD COMPLEX 45 MIN: CPT

## 2019-01-23 PROCEDURE — 92610 EVALUATE SWALLOWING FUNCTION: CPT

## 2019-01-23 PROCEDURE — 83550 IRON BINDING TEST: CPT | Performed by: NURSE PRACTITIONER

## 2019-01-23 PROCEDURE — 82728 ASSAY OF FERRITIN: CPT | Performed by: NURSE PRACTITIONER

## 2019-01-23 PROCEDURE — 63710000001 PREDNISONE PER 5 MG: Performed by: NURSE PRACTITIONER

## 2019-01-23 PROCEDURE — 80048 BASIC METABOLIC PNL TOTAL CA: CPT | Performed by: NURSE PRACTITIONER

## 2019-01-23 PROCEDURE — 25010000002 HEPARIN (PORCINE) PER 1000 UNITS: Performed by: INTERNAL MEDICINE

## 2019-01-23 PROCEDURE — 97116 GAIT TRAINING THERAPY: CPT

## 2019-01-23 PROCEDURE — 97530 THERAPEUTIC ACTIVITIES: CPT

## 2019-01-23 PROCEDURE — 97162 PT EVAL MOD COMPLEX 30 MIN: CPT

## 2019-01-23 PROCEDURE — 97110 THERAPEUTIC EXERCISES: CPT

## 2019-01-23 PROCEDURE — 85027 COMPLETE CBC AUTOMATED: CPT | Performed by: NURSE PRACTITIONER

## 2019-01-23 PROCEDURE — 92611 MOTION FLUOROSCOPY/SWALLOW: CPT

## 2019-01-23 RX ORDER — ACETAZOLAMIDE 250 MG/1
250 TABLET ORAL 3 TIMES DAILY
Status: DISCONTINUED | OUTPATIENT
Start: 2019-01-23 | End: 2019-01-23

## 2019-01-23 RX ORDER — PREDNISONE 20 MG/1
20 TABLET ORAL
Status: DISCONTINUED | OUTPATIENT
Start: 2019-01-24 | End: 2019-01-30

## 2019-01-23 RX ORDER — ACETAMINOPHEN 325 MG/1
650 TABLET ORAL 4 TIMES DAILY PRN
Status: DISCONTINUED | OUTPATIENT
Start: 2019-01-23 | End: 2019-02-05 | Stop reason: HOSPADM

## 2019-01-23 RX ORDER — ACETAZOLAMIDE 250 MG/1
250 TABLET ORAL 3 TIMES DAILY
Status: COMPLETED | OUTPATIENT
Start: 2019-01-23 | End: 2019-01-25

## 2019-01-23 RX ORDER — HEPARIN SODIUM 5000 [USP'U]/ML
5000 INJECTION, SOLUTION INTRAVENOUS; SUBCUTANEOUS EVERY 8 HOURS SCHEDULED
Status: DISCONTINUED | OUTPATIENT
Start: 2019-01-23 | End: 2019-02-05 | Stop reason: HOSPADM

## 2019-01-23 RX ADMIN — TAMSULOSIN HYDROCHLORIDE 0.4 MG: 0.4 CAPSULE ORAL at 21:18

## 2019-01-23 RX ADMIN — PREDNISONE 5 MG: 10 TABLET ORAL at 08:17

## 2019-01-23 RX ADMIN — TRAMADOL HYDROCHLORIDE 50 MG: 50 TABLET, FILM COATED ORAL at 21:28

## 2019-01-23 RX ADMIN — BARIUM SULFATE 100 ML: 0.81 POWDER, FOR SUSPENSION ORAL at 14:22

## 2019-01-23 RX ADMIN — METOPROLOL TARTRATE 25 MG: 25 TABLET ORAL at 21:18

## 2019-01-23 RX ADMIN — OXYBUTYNIN CHLORIDE 5 MG: 5 TABLET ORAL at 21:19

## 2019-01-23 RX ADMIN — ACETAZOLAMIDE 250 MG: 250 TABLET ORAL at 21:19

## 2019-01-23 RX ADMIN — TRAMADOL HYDROCHLORIDE 50 MG: 50 TABLET, FILM COATED ORAL at 02:58

## 2019-01-23 RX ADMIN — CLOPIDOGREL BISULFATE 75 MG: 75 TABLET, FILM COATED ORAL at 08:17

## 2019-01-23 RX ADMIN — METOPROLOL TARTRATE 25 MG: 25 TABLET ORAL at 08:17

## 2019-01-23 RX ADMIN — ESCITALOPRAM OXALATE 10 MG: 10 TABLET ORAL at 08:17

## 2019-01-23 RX ADMIN — FUROSEMIDE 20 MG: 20 TABLET ORAL at 08:17

## 2019-01-23 RX ADMIN — FINASTERIDE 5 MG: 5 TABLET, FILM COATED ORAL at 08:16

## 2019-01-23 RX ADMIN — BUDESONIDE AND FORMOTEROL FUMARATE DIHYDRATE 1 PUFF: 80; 4.5 AEROSOL RESPIRATORY (INHALATION) at 09:12

## 2019-01-23 RX ADMIN — PANTOPRAZOLE SODIUM 40 MG: 40 INJECTION, POWDER, FOR SOLUTION INTRAVENOUS at 05:39

## 2019-01-23 RX ADMIN — ASPIRIN 81 MG: 81 TABLET, COATED ORAL at 08:17

## 2019-01-23 RX ADMIN — HEPARIN SODIUM 5000 UNITS: 5000 INJECTION INTRAVENOUS; SUBCUTANEOUS at 13:05

## 2019-01-23 RX ADMIN — BARIUM SULFATE 20 ML: 400 PASTE ORAL at 14:21

## 2019-01-23 RX ADMIN — ACETAZOLAMIDE 250 MG: 250 TABLET ORAL at 17:04

## 2019-01-23 RX ADMIN — ALBUTEROL SULFATE 2.5 MG: 2.5 SOLUTION RESPIRATORY (INHALATION) at 09:13

## 2019-01-23 RX ADMIN — SPIRONOLACTONE 25 MG: 25 TABLET ORAL at 08:17

## 2019-01-23 RX ADMIN — HEPARIN SODIUM 5000 UNITS: 5000 INJECTION INTRAVENOUS; SUBCUTANEOUS at 21:19

## 2019-01-23 RX ADMIN — ATORVASTATIN CALCIUM 40 MG: 40 TABLET, FILM COATED ORAL at 21:19

## 2019-01-24 LAB
ALBUMIN SERPL-MCNC: 4.31 G/DL (ref 3.2–4.8)
ALBUMIN/GLOB SERPL: 2.3 G/DL (ref 1.5–2.5)
ALP SERPL-CCNC: 70 U/L (ref 25–100)
ALT SERPL W P-5'-P-CCNC: 20 U/L (ref 7–40)
ANION GAP SERPL CALCULATED.3IONS-SCNC: 6 MMOL/L (ref 3–11)
ARTICHOKE IGE QN: 110 MG/DL (ref 0–130)
AST SERPL-CCNC: 21 U/L (ref 0–33)
BILIRUB SERPL-MCNC: 1.4 MG/DL (ref 0.3–1.2)
BUN BLD-MCNC: 37 MG/DL (ref 9–23)
BUN/CREAT SERPL: 23.7 (ref 7–25)
CALCIUM SPEC-SCNC: 9.5 MG/DL (ref 8.7–10.4)
CHLORIDE SERPL-SCNC: 94 MMOL/L (ref 99–109)
CHOLEST SERPL-MCNC: 188 MG/DL (ref 0–200)
CO2 SERPL-SCNC: 38 MMOL/L (ref 20–31)
CREAT BLD-MCNC: 1.56 MG/DL (ref 0.6–1.3)
FOLATE SERPL-MCNC: 23.06 NG/ML (ref 3.2–20)
GFR SERPL CREATININE-BSD FRML MDRD: 44 ML/MIN/1.73
GLOBULIN UR ELPH-MCNC: 1.9 GM/DL
GLUCOSE BLD-MCNC: 90 MG/DL (ref 70–100)
HDLC SERPL-MCNC: 51 MG/DL (ref 40–60)
POTASSIUM BLD-SCNC: 3.8 MMOL/L (ref 3.5–5.5)
PROT SERPL-MCNC: 6.2 G/DL (ref 5.7–8.2)
SODIUM BLD-SCNC: 138 MMOL/L (ref 132–146)
TRIGL SERPL-MCNC: 160 MG/DL (ref 0–150)
VIT B12 BLD-MCNC: 669 PG/ML (ref 211–911)

## 2019-01-24 PROCEDURE — 99232 SBSQ HOSP IP/OBS MODERATE 35: CPT | Performed by: INTERNAL MEDICINE

## 2019-01-24 PROCEDURE — 80053 COMPREHEN METABOLIC PANEL: CPT | Performed by: INTERNAL MEDICINE

## 2019-01-24 PROCEDURE — 94799 UNLISTED PULMONARY SVC/PX: CPT

## 2019-01-24 PROCEDURE — 99233 SBSQ HOSP IP/OBS HIGH 50: CPT | Performed by: INTERNAL MEDICINE

## 2019-01-24 PROCEDURE — 25010000002 HEPARIN (PORCINE) PER 1000 UNITS: Performed by: INTERNAL MEDICINE

## 2019-01-24 PROCEDURE — 80061 LIPID PANEL: CPT | Performed by: INTERNAL MEDICINE

## 2019-01-24 PROCEDURE — 82746 ASSAY OF FOLIC ACID SERUM: CPT | Performed by: INTERNAL MEDICINE

## 2019-01-24 PROCEDURE — 25010000002 ONDANSETRON PER 1 MG: Performed by: PHYSICIAN ASSISTANT

## 2019-01-24 PROCEDURE — 82607 VITAMIN B-12: CPT | Performed by: INTERNAL MEDICINE

## 2019-01-24 PROCEDURE — 63710000001 PREDNISONE PER 1 MG: Performed by: INTERNAL MEDICINE

## 2019-01-24 RX ORDER — TOBRAMYCIN 3 MG/ML
1 SOLUTION/ DROPS OPHTHALMIC
Status: DISCONTINUED | OUTPATIENT
Start: 2019-01-24 | End: 2019-02-05 | Stop reason: HOSPADM

## 2019-01-24 RX ORDER — ONDANSETRON 2 MG/ML
4 INJECTION INTRAMUSCULAR; INTRAVENOUS EVERY 6 HOURS PRN
Status: DISCONTINUED | OUTPATIENT
Start: 2019-01-24 | End: 2019-02-05 | Stop reason: HOSPADM

## 2019-01-24 RX ADMIN — METOPROLOL TARTRATE 25 MG: 25 TABLET ORAL at 21:04

## 2019-01-24 RX ADMIN — ATORVASTATIN CALCIUM 40 MG: 40 TABLET, FILM COATED ORAL at 21:04

## 2019-01-24 RX ADMIN — HEPARIN SODIUM 5000 UNITS: 5000 INJECTION INTRAVENOUS; SUBCUTANEOUS at 14:22

## 2019-01-24 RX ADMIN — HEPARIN SODIUM 5000 UNITS: 5000 INJECTION INTRAVENOUS; SUBCUTANEOUS at 21:01

## 2019-01-24 RX ADMIN — ACETAZOLAMIDE 250 MG: 250 TABLET ORAL at 16:20

## 2019-01-24 RX ADMIN — ONDANSETRON 4 MG: 2 INJECTION INTRAMUSCULAR; INTRAVENOUS at 16:19

## 2019-01-24 RX ADMIN — SPIRONOLACTONE 25 MG: 25 TABLET ORAL at 09:12

## 2019-01-24 RX ADMIN — PREDNISONE 20 MG: 20 TABLET ORAL at 09:12

## 2019-01-24 RX ADMIN — TOBRAMYCIN 1 DROP: 3 SOLUTION/ DROPS OPHTHALMIC at 17:29

## 2019-01-24 RX ADMIN — ACETAZOLAMIDE 250 MG: 250 TABLET ORAL at 09:12

## 2019-01-24 RX ADMIN — TRAMADOL HYDROCHLORIDE 50 MG: 50 TABLET, FILM COATED ORAL at 21:00

## 2019-01-24 RX ADMIN — CLOPIDOGREL BISULFATE 75 MG: 75 TABLET, FILM COATED ORAL at 09:13

## 2019-01-24 RX ADMIN — BUDESONIDE AND FORMOTEROL FUMARATE DIHYDRATE 1 PUFF: 80; 4.5 AEROSOL RESPIRATORY (INHALATION) at 08:35

## 2019-01-24 RX ADMIN — ALPRAZOLAM 0.5 MG: 0.5 TABLET ORAL at 21:00

## 2019-01-24 RX ADMIN — ONDANSETRON 4 MG: 2 INJECTION INTRAMUSCULAR; INTRAVENOUS at 09:10

## 2019-01-24 RX ADMIN — ACETAZOLAMIDE 250 MG: 250 TABLET ORAL at 21:04

## 2019-01-24 RX ADMIN — ALPRAZOLAM 0.5 MG: 0.5 TABLET ORAL at 02:05

## 2019-01-24 RX ADMIN — TOBRAMYCIN 1 DROP: 3 SOLUTION/ DROPS OPHTHALMIC at 21:03

## 2019-01-24 RX ADMIN — PANTOPRAZOLE SODIUM 40 MG: 40 INJECTION, POWDER, FOR SOLUTION INTRAVENOUS at 05:09

## 2019-01-24 RX ADMIN — OXYBUTYNIN CHLORIDE 5 MG: 5 TABLET ORAL at 21:04

## 2019-01-24 RX ADMIN — FUROSEMIDE 20 MG: 20 TABLET ORAL at 09:13

## 2019-01-24 RX ADMIN — ASPIRIN 81 MG: 81 TABLET, COATED ORAL at 09:12

## 2019-01-24 RX ADMIN — FINASTERIDE 5 MG: 5 TABLET, FILM COATED ORAL at 09:12

## 2019-01-24 RX ADMIN — HEPARIN SODIUM 5000 UNITS: 5000 INJECTION INTRAVENOUS; SUBCUTANEOUS at 05:09

## 2019-01-24 RX ADMIN — ONDANSETRON 4 MG: 2 INJECTION INTRAMUSCULAR; INTRAVENOUS at 22:53

## 2019-01-24 RX ADMIN — ESCITALOPRAM OXALATE 10 MG: 10 TABLET ORAL at 09:12

## 2019-01-24 RX ADMIN — TAMSULOSIN HYDROCHLORIDE 0.4 MG: 0.4 CAPSULE ORAL at 21:04

## 2019-01-24 RX ADMIN — METOPROLOL TARTRATE 25 MG: 25 TABLET ORAL at 09:13

## 2019-01-25 PROBLEM — R11.0 NAUSEA: Status: ACTIVE | Noted: 2019-01-22

## 2019-01-25 PROBLEM — R13.19 ESOPHAGEAL DYSPHAGIA: Status: ACTIVE | Noted: 2019-01-22

## 2019-01-25 LAB
ANION GAP SERPL CALCULATED.3IONS-SCNC: 9 MMOL/L (ref 3–11)
BUN BLD-MCNC: 47 MG/DL (ref 9–23)
BUN/CREAT SERPL: 24.1 (ref 7–25)
CALCIUM SPEC-SCNC: 9.3 MG/DL (ref 8.7–10.4)
CHLORIDE SERPL-SCNC: 96 MMOL/L (ref 99–109)
CO2 SERPL-SCNC: 33 MMOL/L (ref 20–31)
CORTIS SERPL-MCNC: 10.3 MCG/DL
CREAT BLD-MCNC: 1.95 MG/DL (ref 0.6–1.3)
GFR SERPL CREATININE-BSD FRML MDRD: 34 ML/MIN/1.73
GLUCOSE BLD-MCNC: 107 MG/DL (ref 70–100)
POTASSIUM BLD-SCNC: 4.6 MMOL/L (ref 3.5–5.5)
SODIUM BLD-SCNC: 138 MMOL/L (ref 132–146)

## 2019-01-25 PROCEDURE — 25010000002 ONDANSETRON PER 1 MG: Performed by: PHYSICIAN ASSISTANT

## 2019-01-25 PROCEDURE — 80048 BASIC METABOLIC PNL TOTAL CA: CPT | Performed by: INTERNAL MEDICINE

## 2019-01-25 PROCEDURE — 99222 1ST HOSP IP/OBS MODERATE 55: CPT | Performed by: INTERNAL MEDICINE

## 2019-01-25 PROCEDURE — 94799 UNLISTED PULMONARY SVC/PX: CPT

## 2019-01-25 PROCEDURE — 97535 SELF CARE MNGMENT TRAINING: CPT

## 2019-01-25 PROCEDURE — 99232 SBSQ HOSP IP/OBS MODERATE 35: CPT | Performed by: INTERNAL MEDICINE

## 2019-01-25 PROCEDURE — 82533 TOTAL CORTISOL: CPT | Performed by: INTERNAL MEDICINE

## 2019-01-25 PROCEDURE — 63710000001 PREDNISONE PER 1 MG: Performed by: INTERNAL MEDICINE

## 2019-01-25 PROCEDURE — 94640 AIRWAY INHALATION TREATMENT: CPT

## 2019-01-25 PROCEDURE — 97530 THERAPEUTIC ACTIVITIES: CPT

## 2019-01-25 PROCEDURE — 25010000002 HEPARIN (PORCINE) PER 1000 UNITS: Performed by: INTERNAL MEDICINE

## 2019-01-25 RX ADMIN — TOBRAMYCIN 1 DROP: 3 SOLUTION/ DROPS OPHTHALMIC at 03:22

## 2019-01-25 RX ADMIN — PANTOPRAZOLE SODIUM 40 MG: 40 INJECTION, POWDER, FOR SOLUTION INTRAVENOUS at 06:03

## 2019-01-25 RX ADMIN — FINASTERIDE 5 MG: 5 TABLET, FILM COATED ORAL at 08:11

## 2019-01-25 RX ADMIN — OXYBUTYNIN CHLORIDE 5 MG: 5 TABLET ORAL at 20:49

## 2019-01-25 RX ADMIN — TRAMADOL HYDROCHLORIDE 50 MG: 50 TABLET, FILM COATED ORAL at 20:51

## 2019-01-25 RX ADMIN — ASPIRIN 81 MG: 81 TABLET, COATED ORAL at 08:11

## 2019-01-25 RX ADMIN — TOBRAMYCIN 1 DROP: 3 SOLUTION/ DROPS OPHTHALMIC at 20:49

## 2019-01-25 RX ADMIN — HEPARIN SODIUM 5000 UNITS: 5000 INJECTION INTRAVENOUS; SUBCUTANEOUS at 21:00

## 2019-01-25 RX ADMIN — ALBUTEROL SULFATE 2.5 MG: 2.5 SOLUTION RESPIRATORY (INHALATION) at 19:09

## 2019-01-25 RX ADMIN — TOBRAMYCIN 1 DROP: 3 SOLUTION/ DROPS OPHTHALMIC at 00:28

## 2019-01-25 RX ADMIN — TOBRAMYCIN 1 DROP: 3 SOLUTION/ DROPS OPHTHALMIC at 08:11

## 2019-01-25 RX ADMIN — METOPROLOL TARTRATE 25 MG: 25 TABLET ORAL at 08:10

## 2019-01-25 RX ADMIN — CLOPIDOGREL BISULFATE 75 MG: 75 TABLET, FILM COATED ORAL at 08:10

## 2019-01-25 RX ADMIN — ACETAZOLAMIDE 250 MG: 250 TABLET ORAL at 08:11

## 2019-01-25 RX ADMIN — HEPARIN SODIUM 5000 UNITS: 5000 INJECTION INTRAVENOUS; SUBCUTANEOUS at 06:03

## 2019-01-25 RX ADMIN — TAMSULOSIN HYDROCHLORIDE 0.4 MG: 0.4 CAPSULE ORAL at 20:49

## 2019-01-25 RX ADMIN — ESCITALOPRAM OXALATE 10 MG: 10 TABLET ORAL at 08:10

## 2019-01-25 RX ADMIN — ALPRAZOLAM 0.5 MG: 0.5 TABLET ORAL at 20:51

## 2019-01-25 RX ADMIN — TOBRAMYCIN 1 DROP: 3 SOLUTION/ DROPS OPHTHALMIC at 15:07

## 2019-01-25 RX ADMIN — HEPARIN SODIUM 5000 UNITS: 5000 INJECTION INTRAVENOUS; SUBCUTANEOUS at 14:54

## 2019-01-25 RX ADMIN — ONDANSETRON 4 MG: 2 INJECTION INTRAMUSCULAR; INTRAVENOUS at 20:50

## 2019-01-25 RX ADMIN — BUDESONIDE AND FORMOTEROL FUMARATE DIHYDRATE 1 PUFF: 80; 4.5 AEROSOL RESPIRATORY (INHALATION) at 09:02

## 2019-01-25 RX ADMIN — ONDANSETRON 4 MG: 2 INJECTION INTRAMUSCULAR; INTRAVENOUS at 14:59

## 2019-01-25 RX ADMIN — ATORVASTATIN CALCIUM 40 MG: 40 TABLET, FILM COATED ORAL at 20:48

## 2019-01-25 RX ADMIN — TOBRAMYCIN 1 DROP: 3 SOLUTION/ DROPS OPHTHALMIC at 13:00

## 2019-01-25 RX ADMIN — PREDNISONE 20 MG: 20 TABLET ORAL at 08:10

## 2019-01-25 RX ADMIN — SPIRONOLACTONE 25 MG: 25 TABLET ORAL at 08:10

## 2019-01-25 RX ADMIN — METOPROLOL TARTRATE 25 MG: 25 TABLET ORAL at 20:49

## 2019-01-26 ENCOUNTER — ANESTHESIA EVENT (OUTPATIENT)
Dept: GASTROENTEROLOGY | Facility: HOSPITAL | Age: 69
End: 2019-01-26

## 2019-01-26 ENCOUNTER — ANESTHESIA (OUTPATIENT)
Dept: GASTROENTEROLOGY | Facility: HOSPITAL | Age: 69
End: 2019-01-26

## 2019-01-26 LAB
ANION GAP SERPL CALCULATED.3IONS-SCNC: 8 MMOL/L (ref 3–11)
BUN BLD-MCNC: 47 MG/DL (ref 9–23)
BUN/CREAT SERPL: 24.7 (ref 7–25)
CALCIUM SPEC-SCNC: 9.6 MG/DL (ref 8.7–10.4)
CHLORIDE SERPL-SCNC: 97 MMOL/L (ref 99–109)
CO2 SERPL-SCNC: 29 MMOL/L (ref 20–31)
CREAT BLD-MCNC: 1.9 MG/DL (ref 0.6–1.3)
GFR SERPL CREATININE-BSD FRML MDRD: 35 ML/MIN/1.73
GLUCOSE BLD-MCNC: 97 MG/DL (ref 70–100)
PHOSPHATE SERPL-MCNC: 3.7 MG/DL (ref 2.4–5.1)
POTASSIUM BLD-SCNC: 3.9 MMOL/L (ref 3.5–5.5)
SODIUM BLD-SCNC: 134 MMOL/L (ref 132–146)

## 2019-01-26 PROCEDURE — 94799 UNLISTED PULMONARY SVC/PX: CPT

## 2019-01-26 PROCEDURE — 99231 SBSQ HOSP IP/OBS SF/LOW 25: CPT | Performed by: INTERNAL MEDICINE

## 2019-01-26 PROCEDURE — 25010000002 HEPARIN (PORCINE) PER 1000 UNITS: Performed by: INTERNAL MEDICINE

## 2019-01-26 PROCEDURE — 80048 BASIC METABOLIC PNL TOTAL CA: CPT | Performed by: INTERNAL MEDICINE

## 2019-01-26 PROCEDURE — 0DB78ZX EXCISION OF STOMACH, PYLORUS, VIA NATURAL OR ARTIFICIAL OPENING ENDOSCOPIC, DIAGNOSTIC: ICD-10-PCS | Performed by: INTERNAL MEDICINE

## 2019-01-26 PROCEDURE — 25010000002 METOCLOPRAMIDE PER 10 MG: Performed by: INTERNAL MEDICINE

## 2019-01-26 PROCEDURE — 84100 ASSAY OF PHOSPHORUS: CPT | Performed by: INTERNAL MEDICINE

## 2019-01-26 PROCEDURE — 94760 N-INVAS EAR/PLS OXIMETRY 1: CPT

## 2019-01-26 PROCEDURE — 25010000002 PROPOFOL 10 MG/ML EMULSION: Performed by: ANESTHESIOLOGY

## 2019-01-26 PROCEDURE — 99232 SBSQ HOSP IP/OBS MODERATE 35: CPT | Performed by: INTERNAL MEDICINE

## 2019-01-26 PROCEDURE — 25010000002 ONDANSETRON PER 1 MG: Performed by: PHYSICIAN ASSISTANT

## 2019-01-26 PROCEDURE — 63710000001 PREDNISONE PER 1 MG: Performed by: INTERNAL MEDICINE

## 2019-01-26 PROCEDURE — 88305 TISSUE EXAM BY PATHOLOGIST: CPT | Performed by: INTERNAL MEDICINE

## 2019-01-26 PROCEDURE — 94640 AIRWAY INHALATION TREATMENT: CPT

## 2019-01-26 RX ORDER — METOCLOPRAMIDE HYDROCHLORIDE 5 MG/ML
10 INJECTION INTRAMUSCULAR; INTRAVENOUS EVERY 6 HOURS
Status: COMPLETED | OUTPATIENT
Start: 2019-01-26 | End: 2019-01-29

## 2019-01-26 RX ORDER — LIDOCAINE HYDROCHLORIDE 10 MG/ML
0.5 INJECTION, SOLUTION EPIDURAL; INFILTRATION; INTRACAUDAL; PERINEURAL ONCE AS NEEDED
Status: DISCONTINUED | OUTPATIENT
Start: 2019-01-26 | End: 2019-01-26 | Stop reason: HOSPADM

## 2019-01-26 RX ORDER — SODIUM CHLORIDE 0.9 % (FLUSH) 0.9 %
1-10 SYRINGE (ML) INJECTION AS NEEDED
Status: DISCONTINUED | OUTPATIENT
Start: 2019-01-26 | End: 2019-01-26 | Stop reason: HOSPADM

## 2019-01-26 RX ORDER — FAMOTIDINE 20 MG/1
20 TABLET, FILM COATED ORAL
Status: DISCONTINUED | OUTPATIENT
Start: 2019-01-26 | End: 2019-01-26 | Stop reason: HOSPADM

## 2019-01-26 RX ORDER — PROPOFOL 10 MG/ML
VIAL (ML) INTRAVENOUS AS NEEDED
Status: DISCONTINUED | OUTPATIENT
Start: 2019-01-26 | End: 2019-01-26 | Stop reason: SURG

## 2019-01-26 RX ORDER — SODIUM CHLORIDE, SODIUM LACTATE, POTASSIUM CHLORIDE, CALCIUM CHLORIDE 600; 310; 30; 20 MG/100ML; MG/100ML; MG/100ML; MG/100ML
9 INJECTION, SOLUTION INTRAVENOUS CONTINUOUS PRN
Status: DISCONTINUED | OUTPATIENT
Start: 2019-01-26 | End: 2019-01-26 | Stop reason: HOSPADM

## 2019-01-26 RX ORDER — SODIUM CHLORIDE 0.9 % (FLUSH) 0.9 %
3 SYRINGE (ML) INJECTION EVERY 12 HOURS SCHEDULED
Status: DISCONTINUED | OUTPATIENT
Start: 2019-01-26 | End: 2019-01-26 | Stop reason: HOSPADM

## 2019-01-26 RX ADMIN — METOCLOPRAMIDE 10 MG: 5 INJECTION, SOLUTION INTRAMUSCULAR; INTRAVENOUS at 21:08

## 2019-01-26 RX ADMIN — TAMSULOSIN HYDROCHLORIDE 0.4 MG: 0.4 CAPSULE ORAL at 21:07

## 2019-01-26 RX ADMIN — METOCLOPRAMIDE 10 MG: 5 INJECTION, SOLUTION INTRAMUSCULAR; INTRAVENOUS at 09:44

## 2019-01-26 RX ADMIN — TOBRAMYCIN 1 DROP: 3 SOLUTION/ DROPS OPHTHALMIC at 09:44

## 2019-01-26 RX ADMIN — ALBUTEROL SULFATE 2.5 MG: 2.5 SOLUTION RESPIRATORY (INHALATION) at 17:07

## 2019-01-26 RX ADMIN — METOPROLOL TARTRATE 25 MG: 25 TABLET ORAL at 09:43

## 2019-01-26 RX ADMIN — PANTOPRAZOLE SODIUM 40 MG: 40 INJECTION, POWDER, FOR SOLUTION INTRAVENOUS at 05:39

## 2019-01-26 RX ADMIN — BUDESONIDE AND FORMOTEROL FUMARATE DIHYDRATE 1 PUFF: 80; 4.5 AEROSOL RESPIRATORY (INHALATION) at 09:03

## 2019-01-26 RX ADMIN — TOBRAMYCIN 1 DROP: 3 SOLUTION/ DROPS OPHTHALMIC at 00:52

## 2019-01-26 RX ADMIN — METOPROLOL TARTRATE 25 MG: 25 TABLET ORAL at 21:07

## 2019-01-26 RX ADMIN — TOBRAMYCIN 1 DROP: 3 SOLUTION/ DROPS OPHTHALMIC at 23:36

## 2019-01-26 RX ADMIN — TOBRAMYCIN 1 DROP: 3 SOLUTION/ DROPS OPHTHALMIC at 16:34

## 2019-01-26 RX ADMIN — OXYBUTYNIN CHLORIDE 5 MG: 5 TABLET ORAL at 21:07

## 2019-01-26 RX ADMIN — TOBRAMYCIN 1 DROP: 3 SOLUTION/ DROPS OPHTHALMIC at 04:02

## 2019-01-26 RX ADMIN — SPIRONOLACTONE 25 MG: 25 TABLET ORAL at 09:44

## 2019-01-26 RX ADMIN — METOCLOPRAMIDE 10 MG: 5 INJECTION, SOLUTION INTRAMUSCULAR; INTRAVENOUS at 16:34

## 2019-01-26 RX ADMIN — ATORVASTATIN CALCIUM 40 MG: 40 TABLET, FILM COATED ORAL at 21:07

## 2019-01-26 RX ADMIN — HEPARIN SODIUM 5000 UNITS: 5000 INJECTION INTRAVENOUS; SUBCUTANEOUS at 21:09

## 2019-01-26 RX ADMIN — TOBRAMYCIN 1 DROP: 3 SOLUTION/ DROPS OPHTHALMIC at 21:07

## 2019-01-26 RX ADMIN — PREDNISONE 20 MG: 20 TABLET ORAL at 09:45

## 2019-01-26 RX ADMIN — FINASTERIDE 5 MG: 5 TABLET, FILM COATED ORAL at 09:43

## 2019-01-26 RX ADMIN — ONDANSETRON 4 MG: 2 INJECTION INTRAMUSCULAR; INTRAVENOUS at 21:06

## 2019-01-26 RX ADMIN — ESCITALOPRAM OXALATE 10 MG: 10 TABLET ORAL at 09:42

## 2019-01-26 RX ADMIN — PROPOFOL 30 MG: 10 INJECTION, EMULSION INTRAVENOUS at 08:12

## 2019-01-26 RX ADMIN — TRAMADOL HYDROCHLORIDE 50 MG: 50 TABLET, FILM COATED ORAL at 21:06

## 2019-01-26 RX ADMIN — ALPRAZOLAM 0.5 MG: 0.5 TABLET ORAL at 21:06

## 2019-01-26 RX ADMIN — CLOPIDOGREL BISULFATE 75 MG: 75 TABLET, FILM COATED ORAL at 09:43

## 2019-01-26 RX ADMIN — HEPARIN SODIUM 5000 UNITS: 5000 INJECTION INTRAVENOUS; SUBCUTANEOUS at 14:07

## 2019-01-26 RX ADMIN — ALBUTEROL SULFATE 2.5 MG: 2.5 SOLUTION RESPIRATORY (INHALATION) at 09:03

## 2019-01-26 RX ADMIN — HEPARIN SODIUM 5000 UNITS: 5000 INJECTION INTRAVENOUS; SUBCUTANEOUS at 05:38

## 2019-01-26 RX ADMIN — ASPIRIN 81 MG: 81 TABLET, COATED ORAL at 09:44

## 2019-01-26 RX ADMIN — TOBRAMYCIN 1 DROP: 3 SOLUTION/ DROPS OPHTHALMIC at 12:18

## 2019-01-26 NOTE — ANESTHESIA PREPROCEDURE EVALUATION
Anesthesia Evaluation     Patient summary reviewed and Nursing notes reviewed   NPO Solid Status: > 8 hours  NPO Liquid Status: > 8 hours           Airway   Mallampati: I  TM distance: >3 FB  Neck ROM: full  No difficulty expected  Dental    (+) partials, poor dentition and upper dentures    Pulmonary    (+) a smoker (2017), COPD (MDI ) moderate, shortness of breath, sleep apnea on CPAP,   Cardiovascular     ECG reviewed  Patient on routine beta blocker    (+) hypertension, CAD, dysrhythmias, angina with exertion, CHF, hyperlipidemia,     ROS comment: EKG EF45%  Borderline LVH   Moderate LAE  normal LVedp  Aortic sclerosis with mild AI   Mild MR Mild TR with PAsp of 48 mm of hg    Mild dilation of the RV with normal RV Function   Severe HK of the inferior and septal walls   CATH 30% occlusive disease    Neuro/Psych  (+) TIA (2014), syncope,     (-) CVA  GI/Hepatic/Renal/Endo    (+)  hiatal hernia, GERD,  renal disease CRI, diabetes mellitus,     Musculoskeletal     (+) back pain, neck pain,   Abdominal    Substance History      OB/GYN          Other   (+) arthritis     ROS/Med Hx Other: Plavix Prednisone  Creat 1.9 K normal HCT 33  RA Sats 85-88                Anesthesia Plan    ASA 4     MAC   (Minimal sedation topical )  intravenous induction   Anesthetic plan, all risks, benefits, and alternatives have been provided, discussed and informed consent has been obtained with: patient.

## 2019-01-27 LAB
ALBUMIN SERPL-MCNC: 4.63 G/DL (ref 3.2–4.8)
ALBUMIN/GLOB SERPL: 2 G/DL (ref 1.5–2.5)
ALP SERPL-CCNC: 82 U/L (ref 25–100)
ALT SERPL W P-5'-P-CCNC: 17 U/L (ref 7–40)
ANION GAP SERPL CALCULATED.3IONS-SCNC: 7 MMOL/L (ref 3–11)
AST SERPL-CCNC: 16 U/L (ref 0–33)
BILIRUB SERPL-MCNC: 0.9 MG/DL (ref 0.3–1.2)
BUN BLD-MCNC: 56 MG/DL (ref 9–23)
BUN/CREAT SERPL: 31.3 (ref 7–25)
CALCIUM SPEC-SCNC: 9.3 MG/DL (ref 8.7–10.4)
CHLORIDE SERPL-SCNC: 101 MMOL/L (ref 99–109)
CO2 SERPL-SCNC: 26 MMOL/L (ref 20–31)
CREAT BLD-MCNC: 1.79 MG/DL (ref 0.6–1.3)
GFR SERPL CREATININE-BSD FRML MDRD: 38 ML/MIN/1.73
GLOBULIN UR ELPH-MCNC: 2.4 GM/DL
GLUCOSE BLD-MCNC: 132 MG/DL (ref 70–100)
MAGNESIUM SERPL-MCNC: 2.5 MG/DL (ref 1.3–2.7)
PHOSPHATE SERPL-MCNC: 2.4 MG/DL (ref 2.4–5.1)
POTASSIUM BLD-SCNC: 4.8 MMOL/L (ref 3.5–5.5)
PROT SERPL-MCNC: 7 G/DL (ref 5.7–8.2)
SODIUM BLD-SCNC: 134 MMOL/L (ref 132–146)

## 2019-01-27 PROCEDURE — 25010000002 HEPARIN (PORCINE) PER 1000 UNITS: Performed by: INTERNAL MEDICINE

## 2019-01-27 PROCEDURE — 80053 COMPREHEN METABOLIC PANEL: CPT | Performed by: INTERNAL MEDICINE

## 2019-01-27 PROCEDURE — 94799 UNLISTED PULMONARY SVC/PX: CPT

## 2019-01-27 PROCEDURE — 25010000002 ONDANSETRON PER 1 MG: Performed by: PHYSICIAN ASSISTANT

## 2019-01-27 PROCEDURE — 97116 GAIT TRAINING THERAPY: CPT

## 2019-01-27 PROCEDURE — 99232 SBSQ HOSP IP/OBS MODERATE 35: CPT | Performed by: INTERNAL MEDICINE

## 2019-01-27 PROCEDURE — 93010 ELECTROCARDIOGRAM REPORT: CPT | Performed by: INTERNAL MEDICINE

## 2019-01-27 PROCEDURE — 94640 AIRWAY INHALATION TREATMENT: CPT

## 2019-01-27 PROCEDURE — 83735 ASSAY OF MAGNESIUM: CPT | Performed by: INTERNAL MEDICINE

## 2019-01-27 PROCEDURE — 94760 N-INVAS EAR/PLS OXIMETRY 1: CPT

## 2019-01-27 PROCEDURE — 63710000001 PREDNISONE PER 1 MG: Performed by: INTERNAL MEDICINE

## 2019-01-27 PROCEDURE — 25010000002 METOCLOPRAMIDE PER 10 MG: Performed by: INTERNAL MEDICINE

## 2019-01-27 PROCEDURE — 84100 ASSAY OF PHOSPHORUS: CPT | Performed by: INTERNAL MEDICINE

## 2019-01-27 PROCEDURE — 93005 ELECTROCARDIOGRAM TRACING: CPT | Performed by: INTERNAL MEDICINE

## 2019-01-27 RX ADMIN — FINASTERIDE 5 MG: 5 TABLET, FILM COATED ORAL at 08:48

## 2019-01-27 RX ADMIN — METOCLOPRAMIDE 10 MG: 5 INJECTION, SOLUTION INTRAMUSCULAR; INTRAVENOUS at 16:27

## 2019-01-27 RX ADMIN — ALBUTEROL SULFATE 2.5 MG: 2.5 SOLUTION RESPIRATORY (INHALATION) at 12:50

## 2019-01-27 RX ADMIN — METOCLOPRAMIDE 10 MG: 5 INJECTION, SOLUTION INTRAMUSCULAR; INTRAVENOUS at 22:06

## 2019-01-27 RX ADMIN — ALBUTEROL SULFATE 2.5 MG: 2.5 SOLUTION RESPIRATORY (INHALATION) at 06:22

## 2019-01-27 RX ADMIN — METOCLOPRAMIDE 10 MG: 5 INJECTION, SOLUTION INTRAMUSCULAR; INTRAVENOUS at 04:55

## 2019-01-27 RX ADMIN — HEPARIN SODIUM 5000 UNITS: 5000 INJECTION INTRAVENOUS; SUBCUTANEOUS at 14:25

## 2019-01-27 RX ADMIN — TOBRAMYCIN 1 DROP: 3 SOLUTION/ DROPS OPHTHALMIC at 11:51

## 2019-01-27 RX ADMIN — ONDANSETRON 4 MG: 2 INJECTION INTRAMUSCULAR; INTRAVENOUS at 14:30

## 2019-01-27 RX ADMIN — TOBRAMYCIN 1 DROP: 3 SOLUTION/ DROPS OPHTHALMIC at 20:58

## 2019-01-27 RX ADMIN — SPIRONOLACTONE 25 MG: 25 TABLET ORAL at 08:50

## 2019-01-27 RX ADMIN — TOBRAMYCIN 1 DROP: 3 SOLUTION/ DROPS OPHTHALMIC at 08:48

## 2019-01-27 RX ADMIN — ATORVASTATIN CALCIUM 40 MG: 40 TABLET, FILM COATED ORAL at 20:51

## 2019-01-27 RX ADMIN — METOPROLOL TARTRATE 25 MG: 25 TABLET ORAL at 08:50

## 2019-01-27 RX ADMIN — TOBRAMYCIN 1 DROP: 3 SOLUTION/ DROPS OPHTHALMIC at 04:53

## 2019-01-27 RX ADMIN — PANTOPRAZOLE SODIUM 40 MG: 40 INJECTION, POWDER, FOR SOLUTION INTRAVENOUS at 05:07

## 2019-01-27 RX ADMIN — HEPARIN SODIUM 5000 UNITS: 5000 INJECTION INTRAVENOUS; SUBCUTANEOUS at 05:07

## 2019-01-27 RX ADMIN — PREDNISONE 20 MG: 20 TABLET ORAL at 08:48

## 2019-01-27 RX ADMIN — ALBUTEROL SULFATE 2.5 MG: 2.5 SOLUTION RESPIRATORY (INHALATION) at 21:08

## 2019-01-27 RX ADMIN — TOBRAMYCIN 1 DROP: 3 SOLUTION/ DROPS OPHTHALMIC at 16:25

## 2019-01-27 RX ADMIN — OXYBUTYNIN CHLORIDE 5 MG: 5 TABLET ORAL at 20:51

## 2019-01-27 RX ADMIN — CLOPIDOGREL BISULFATE 75 MG: 75 TABLET, FILM COATED ORAL at 08:48

## 2019-01-27 RX ADMIN — BUDESONIDE AND FORMOTEROL FUMARATE DIHYDRATE 1 PUFF: 80; 4.5 AEROSOL RESPIRATORY (INHALATION) at 06:22

## 2019-01-27 RX ADMIN — ESCITALOPRAM OXALATE 10 MG: 10 TABLET ORAL at 08:48

## 2019-01-27 RX ADMIN — TAMSULOSIN HYDROCHLORIDE 0.4 MG: 0.4 CAPSULE ORAL at 20:51

## 2019-01-27 RX ADMIN — METOPROLOL TARTRATE 25 MG: 25 TABLET ORAL at 20:51

## 2019-01-27 RX ADMIN — METOCLOPRAMIDE 10 MG: 5 INJECTION, SOLUTION INTRAMUSCULAR; INTRAVENOUS at 10:58

## 2019-01-27 RX ADMIN — ASPIRIN 81 MG: 81 TABLET, COATED ORAL at 08:48

## 2019-01-27 RX ADMIN — HEPARIN SODIUM 5000 UNITS: 5000 INJECTION INTRAVENOUS; SUBCUTANEOUS at 20:51

## 2019-01-28 ENCOUNTER — APPOINTMENT (OUTPATIENT)
Dept: GENERAL RADIOLOGY | Facility: HOSPITAL | Age: 69
End: 2019-01-28

## 2019-01-28 LAB
ANION GAP SERPL CALCULATED.3IONS-SCNC: 5 MMOL/L (ref 3–11)
BUN BLD-MCNC: 48 MG/DL (ref 9–23)
BUN/CREAT SERPL: 29.6 (ref 7–25)
CALCIUM SPEC-SCNC: 9.2 MG/DL (ref 8.7–10.4)
CHLORIDE SERPL-SCNC: 103 MMOL/L (ref 99–109)
CO2 SERPL-SCNC: 25 MMOL/L (ref 20–31)
CREAT BLD-MCNC: 1.62 MG/DL (ref 0.6–1.3)
GFR SERPL CREATININE-BSD FRML MDRD: 43 ML/MIN/1.73
GLUCOSE BLD-MCNC: 82 MG/DL (ref 70–100)
MAGNESIUM SERPL-MCNC: 2.4 MG/DL (ref 1.3–2.7)
PHOSPHATE SERPL-MCNC: 2.1 MG/DL (ref 2.4–5.1)
POTASSIUM BLD-SCNC: 3.7 MMOL/L (ref 3.5–5.5)
SODIUM BLD-SCNC: 133 MMOL/L (ref 132–146)

## 2019-01-28 PROCEDURE — 25010000002 HEPARIN (PORCINE) PER 1000 UNITS: Performed by: INTERNAL MEDICINE

## 2019-01-28 PROCEDURE — 94799 UNLISTED PULMONARY SVC/PX: CPT

## 2019-01-28 PROCEDURE — 25010000002 METOCLOPRAMIDE PER 10 MG: Performed by: INTERNAL MEDICINE

## 2019-01-28 PROCEDURE — 80048 BASIC METABOLIC PNL TOTAL CA: CPT | Performed by: INTERNAL MEDICINE

## 2019-01-28 PROCEDURE — 97116 GAIT TRAINING THERAPY: CPT

## 2019-01-28 PROCEDURE — 83735 ASSAY OF MAGNESIUM: CPT | Performed by: INTERNAL MEDICINE

## 2019-01-28 PROCEDURE — 74018 RADEX ABDOMEN 1 VIEW: CPT

## 2019-01-28 PROCEDURE — 97530 THERAPEUTIC ACTIVITIES: CPT

## 2019-01-28 PROCEDURE — 99232 SBSQ HOSP IP/OBS MODERATE 35: CPT | Performed by: INTERNAL MEDICINE

## 2019-01-28 PROCEDURE — 99233 SBSQ HOSP IP/OBS HIGH 50: CPT | Performed by: HOSPITALIST

## 2019-01-28 PROCEDURE — 84100 ASSAY OF PHOSPHORUS: CPT | Performed by: INTERNAL MEDICINE

## 2019-01-28 PROCEDURE — 99231 SBSQ HOSP IP/OBS SF/LOW 25: CPT | Performed by: PHYSICIAN ASSISTANT

## 2019-01-28 PROCEDURE — 97110 THERAPEUTIC EXERCISES: CPT

## 2019-01-28 PROCEDURE — 97535 SELF CARE MNGMENT TRAINING: CPT

## 2019-01-28 PROCEDURE — 63710000001 PREDNISONE PER 1 MG: Performed by: INTERNAL MEDICINE

## 2019-01-28 PROCEDURE — 25010000002 ONDANSETRON PER 1 MG: Performed by: PHYSICIAN ASSISTANT

## 2019-01-28 RX ORDER — ATORVASTATIN CALCIUM 10 MG/1
10 TABLET, FILM COATED ORAL NIGHTLY
Status: DISCONTINUED | OUTPATIENT
Start: 2019-01-28 | End: 2019-02-05 | Stop reason: HOSPADM

## 2019-01-28 RX ADMIN — ALBUTEROL SULFATE 2.5 MG: 2.5 SOLUTION RESPIRATORY (INHALATION) at 08:20

## 2019-01-28 RX ADMIN — BUDESONIDE AND FORMOTEROL FUMARATE DIHYDRATE 1 PUFF: 80; 4.5 AEROSOL RESPIRATORY (INHALATION) at 08:20

## 2019-01-28 RX ADMIN — ALBUTEROL SULFATE 2.5 MG: 2.5 SOLUTION RESPIRATORY (INHALATION) at 04:45

## 2019-01-28 RX ADMIN — ONDANSETRON 4 MG: 2 INJECTION INTRAMUSCULAR; INTRAVENOUS at 12:18

## 2019-01-28 RX ADMIN — TAMSULOSIN HYDROCHLORIDE 0.4 MG: 0.4 CAPSULE ORAL at 20:14

## 2019-01-28 RX ADMIN — ALPRAZOLAM 0.5 MG: 0.5 TABLET ORAL at 20:14

## 2019-01-28 RX ADMIN — PANTOPRAZOLE SODIUM 40 MG: 40 INJECTION, POWDER, FOR SOLUTION INTRAVENOUS at 06:08

## 2019-01-28 RX ADMIN — BACLOFEN 10 MG: 10 TABLET ORAL at 17:05

## 2019-01-28 RX ADMIN — HEPARIN SODIUM 5000 UNITS: 5000 INJECTION INTRAVENOUS; SUBCUTANEOUS at 20:14

## 2019-01-28 RX ADMIN — METOCLOPRAMIDE 10 MG: 5 INJECTION, SOLUTION INTRAMUSCULAR; INTRAVENOUS at 22:00

## 2019-01-28 RX ADMIN — POTASSIUM & SODIUM PHOSPHATES POWDER PACK 280-160-250 MG 2 PACKET: 280-160-250 PACK at 16:15

## 2019-01-28 RX ADMIN — METOPROLOL TARTRATE 25 MG: 25 TABLET ORAL at 20:13

## 2019-01-28 RX ADMIN — ESCITALOPRAM OXALATE 10 MG: 10 TABLET ORAL at 09:21

## 2019-01-28 RX ADMIN — ALPRAZOLAM 0.5 MG: 0.5 TABLET ORAL at 04:38

## 2019-01-28 RX ADMIN — HEPARIN SODIUM 5000 UNITS: 5000 INJECTION INTRAVENOUS; SUBCUTANEOUS at 06:08

## 2019-01-28 RX ADMIN — CLOPIDOGREL BISULFATE 75 MG: 75 TABLET, FILM COATED ORAL at 09:15

## 2019-01-28 RX ADMIN — TOBRAMYCIN 1 DROP: 3 SOLUTION/ DROPS OPHTHALMIC at 17:05

## 2019-01-28 RX ADMIN — TOBRAMYCIN 1 DROP: 3 SOLUTION/ DROPS OPHTHALMIC at 04:32

## 2019-01-28 RX ADMIN — HEPARIN SODIUM 5000 UNITS: 5000 INJECTION INTRAVENOUS; SUBCUTANEOUS at 16:15

## 2019-01-28 RX ADMIN — TOBRAMYCIN 1 DROP: 3 SOLUTION/ DROPS OPHTHALMIC at 23:44

## 2019-01-28 RX ADMIN — TRAMADOL HYDROCHLORIDE 50 MG: 50 TABLET, FILM COATED ORAL at 20:13

## 2019-01-28 RX ADMIN — SPIRONOLACTONE 25 MG: 25 TABLET ORAL at 09:15

## 2019-01-28 RX ADMIN — TOBRAMYCIN 1 DROP: 3 SOLUTION/ DROPS OPHTHALMIC at 09:16

## 2019-01-28 RX ADMIN — PREDNISONE 20 MG: 20 TABLET ORAL at 09:22

## 2019-01-28 RX ADMIN — ASPIRIN 81 MG: 81 TABLET, COATED ORAL at 09:20

## 2019-01-28 RX ADMIN — METOPROLOL TARTRATE 25 MG: 25 TABLET ORAL at 09:21

## 2019-01-28 RX ADMIN — FINASTERIDE 5 MG: 5 TABLET, FILM COATED ORAL at 09:15

## 2019-01-28 RX ADMIN — TRAMADOL HYDROCHLORIDE 50 MG: 50 TABLET, FILM COATED ORAL at 12:50

## 2019-01-28 RX ADMIN — METOCLOPRAMIDE 10 MG: 5 INJECTION, SOLUTION INTRAMUSCULAR; INTRAVENOUS at 09:15

## 2019-01-28 RX ADMIN — TOBRAMYCIN 1 DROP: 3 SOLUTION/ DROPS OPHTHALMIC at 20:13

## 2019-01-28 RX ADMIN — METOCLOPRAMIDE 10 MG: 5 INJECTION, SOLUTION INTRAMUSCULAR; INTRAVENOUS at 16:14

## 2019-01-28 RX ADMIN — ATORVASTATIN CALCIUM 10 MG: 10 TABLET, FILM COATED ORAL at 20:13

## 2019-01-28 RX ADMIN — METOCLOPRAMIDE 10 MG: 5 INJECTION, SOLUTION INTRAMUSCULAR; INTRAVENOUS at 04:32

## 2019-01-28 RX ADMIN — OXYBUTYNIN CHLORIDE 5 MG: 5 TABLET ORAL at 20:18

## 2019-01-28 RX ADMIN — TOBRAMYCIN 1 DROP: 3 SOLUTION/ DROPS OPHTHALMIC at 12:18

## 2019-01-29 LAB
CYTO UR: NORMAL
LAB AP CASE REPORT: NORMAL
LAB AP CLINICAL INFORMATION: NORMAL
PATH REPORT.FINAL DX SPEC: NORMAL
PATH REPORT.GROSS SPEC: NORMAL

## 2019-01-29 PROCEDURE — 25010000002 HEPARIN (PORCINE) PER 1000 UNITS: Performed by: INTERNAL MEDICINE

## 2019-01-29 PROCEDURE — 94760 N-INVAS EAR/PLS OXIMETRY 1: CPT

## 2019-01-29 PROCEDURE — 99232 SBSQ HOSP IP/OBS MODERATE 35: CPT | Performed by: HOSPITALIST

## 2019-01-29 PROCEDURE — 25010000002 ONDANSETRON PER 1 MG: Performed by: PHYSICIAN ASSISTANT

## 2019-01-29 PROCEDURE — 94640 AIRWAY INHALATION TREATMENT: CPT

## 2019-01-29 PROCEDURE — 25010000002 METOCLOPRAMIDE PER 10 MG: Performed by: INTERNAL MEDICINE

## 2019-01-29 PROCEDURE — 94799 UNLISTED PULMONARY SVC/PX: CPT

## 2019-01-29 PROCEDURE — 99231 SBSQ HOSP IP/OBS SF/LOW 25: CPT | Performed by: PHYSICIAN ASSISTANT

## 2019-01-29 PROCEDURE — 97530 THERAPEUTIC ACTIVITIES: CPT

## 2019-01-29 PROCEDURE — 63710000001 PREDNISONE PER 1 MG: Performed by: INTERNAL MEDICINE

## 2019-01-29 RX ORDER — METOCLOPRAMIDE HYDROCHLORIDE 5 MG/ML
10 INJECTION INTRAMUSCULAR; INTRAVENOUS
Status: DISCONTINUED | OUTPATIENT
Start: 2019-01-29 | End: 2019-01-30

## 2019-01-29 RX ORDER — METOCLOPRAMIDE HYDROCHLORIDE 5 MG/ML
10 INJECTION INTRAMUSCULAR; INTRAVENOUS ONCE
Status: COMPLETED | OUTPATIENT
Start: 2019-01-29 | End: 2019-01-29

## 2019-01-29 RX ADMIN — ALBUTEROL SULFATE 2.5 MG: 2.5 SOLUTION RESPIRATORY (INHALATION) at 06:36

## 2019-01-29 RX ADMIN — HEPARIN SODIUM 5000 UNITS: 5000 INJECTION INTRAVENOUS; SUBCUTANEOUS at 14:30

## 2019-01-29 RX ADMIN — ONDANSETRON 4 MG: 2 INJECTION INTRAMUSCULAR; INTRAVENOUS at 05:24

## 2019-01-29 RX ADMIN — METOCLOPRAMIDE 10 MG: 5 INJECTION, SOLUTION INTRAMUSCULAR; INTRAVENOUS at 18:22

## 2019-01-29 RX ADMIN — TRAMADOL HYDROCHLORIDE 50 MG: 50 TABLET, FILM COATED ORAL at 20:47

## 2019-01-29 RX ADMIN — PREDNISONE 20 MG: 20 TABLET ORAL at 09:14

## 2019-01-29 RX ADMIN — ONDANSETRON 4 MG: 2 INJECTION INTRAMUSCULAR; INTRAVENOUS at 12:44

## 2019-01-29 RX ADMIN — FINASTERIDE 5 MG: 5 TABLET, FILM COATED ORAL at 09:14

## 2019-01-29 RX ADMIN — METOPROLOL TARTRATE 25 MG: 25 TABLET ORAL at 09:14

## 2019-01-29 RX ADMIN — TOBRAMYCIN 1 DROP: 3 SOLUTION/ DROPS OPHTHALMIC at 20:47

## 2019-01-29 RX ADMIN — TOBRAMYCIN 1 DROP: 3 SOLUTION/ DROPS OPHTHALMIC at 09:15

## 2019-01-29 RX ADMIN — SPIRONOLACTONE 25 MG: 25 TABLET ORAL at 09:15

## 2019-01-29 RX ADMIN — ASPIRIN 81 MG: 81 TABLET, COATED ORAL at 09:15

## 2019-01-29 RX ADMIN — METOCLOPRAMIDE 10 MG: 5 INJECTION, SOLUTION INTRAMUSCULAR; INTRAVENOUS at 20:49

## 2019-01-29 RX ADMIN — METOPROLOL TARTRATE 25 MG: 25 TABLET ORAL at 20:49

## 2019-01-29 RX ADMIN — ATORVASTATIN CALCIUM 10 MG: 10 TABLET, FILM COATED ORAL at 20:49

## 2019-01-29 RX ADMIN — CLOPIDOGREL BISULFATE 75 MG: 75 TABLET, FILM COATED ORAL at 09:14

## 2019-01-29 RX ADMIN — ALPRAZOLAM 0.5 MG: 0.5 TABLET ORAL at 20:47

## 2019-01-29 RX ADMIN — TOBRAMYCIN 1 DROP: 3 SOLUTION/ DROPS OPHTHALMIC at 23:53

## 2019-01-29 RX ADMIN — ALBUTEROL SULFATE 2.5 MG: 2.5 SOLUTION RESPIRATORY (INHALATION) at 11:52

## 2019-01-29 RX ADMIN — HEPARIN SODIUM 5000 UNITS: 5000 INJECTION INTRAVENOUS; SUBCUTANEOUS at 20:55

## 2019-01-29 RX ADMIN — PANTOPRAZOLE SODIUM 40 MG: 40 INJECTION, POWDER, FOR SOLUTION INTRAVENOUS at 05:17

## 2019-01-29 RX ADMIN — METOCLOPRAMIDE 10 MG: 5 INJECTION, SOLUTION INTRAMUSCULAR; INTRAVENOUS at 05:17

## 2019-01-29 RX ADMIN — TOBRAMYCIN 1 DROP: 3 SOLUTION/ DROPS OPHTHALMIC at 05:17

## 2019-01-29 RX ADMIN — HEPARIN SODIUM 5000 UNITS: 5000 INJECTION INTRAVENOUS; SUBCUTANEOUS at 05:17

## 2019-01-29 RX ADMIN — HEPARIN SODIUM 5000 UNITS: 5000 INJECTION INTRAVENOUS; SUBCUTANEOUS at 22:00

## 2019-01-29 RX ADMIN — TRAMADOL HYDROCHLORIDE 50 MG: 50 TABLET, FILM COATED ORAL at 05:24

## 2019-01-29 RX ADMIN — OXYBUTYNIN CHLORIDE 5 MG: 5 TABLET ORAL at 20:49

## 2019-01-29 RX ADMIN — BUDESONIDE AND FORMOTEROL FUMARATE DIHYDRATE 1 PUFF: 80; 4.5 AEROSOL RESPIRATORY (INHALATION) at 06:37

## 2019-01-29 RX ADMIN — TAMSULOSIN HYDROCHLORIDE 0.4 MG: 0.4 CAPSULE ORAL at 20:49

## 2019-01-29 RX ADMIN — TOBRAMYCIN 1 DROP: 3 SOLUTION/ DROPS OPHTHALMIC at 15:59

## 2019-01-29 RX ADMIN — TOBRAMYCIN 1 DROP: 3 SOLUTION/ DROPS OPHTHALMIC at 12:18

## 2019-01-29 RX ADMIN — ESCITALOPRAM OXALATE 10 MG: 10 TABLET ORAL at 09:14

## 2019-01-30 LAB
BACTERIA UR QL AUTO: ABNORMAL /HPF
BILIRUB UR QL STRIP: NEGATIVE
CLARITY UR: CLEAR
COLOR UR: YELLOW
GLUCOSE UR STRIP-MCNC: NEGATIVE MG/DL
HGB UR QL STRIP.AUTO: NEGATIVE
HYALINE CASTS UR QL AUTO: ABNORMAL /LPF
KETONES UR QL STRIP: ABNORMAL
LEUKOCYTE ESTERASE UR QL STRIP.AUTO: ABNORMAL
NITRITE UR QL STRIP: NEGATIVE
PH UR STRIP.AUTO: <=5 [PH] (ref 5–8)
PROT UR QL STRIP: NEGATIVE
RBC # UR: ABNORMAL /HPF
REF LAB TEST METHOD: ABNORMAL
SP GR UR STRIP: 1.02 (ref 1–1.03)
SQUAMOUS #/AREA URNS HPF: ABNORMAL /HPF
UROBILINOGEN UR QL STRIP: ABNORMAL
WBC UR QL AUTO: ABNORMAL /HPF

## 2019-01-30 PROCEDURE — 63710000001 PREDNISONE PER 1 MG: Performed by: INTERNAL MEDICINE

## 2019-01-30 PROCEDURE — 99232 SBSQ HOSP IP/OBS MODERATE 35: CPT | Performed by: INTERNAL MEDICINE

## 2019-01-30 PROCEDURE — 99233 SBSQ HOSP IP/OBS HIGH 50: CPT | Performed by: PHYSICIAN ASSISTANT

## 2019-01-30 PROCEDURE — 97530 THERAPEUTIC ACTIVITIES: CPT

## 2019-01-30 PROCEDURE — 25010000002 HEPARIN (PORCINE) PER 1000 UNITS: Performed by: INTERNAL MEDICINE

## 2019-01-30 PROCEDURE — 25010000002 METOCLOPRAMIDE PER 10 MG: Performed by: INTERNAL MEDICINE

## 2019-01-30 PROCEDURE — 97110 THERAPEUTIC EXERCISES: CPT

## 2019-01-30 PROCEDURE — 97116 GAIT TRAINING THERAPY: CPT

## 2019-01-30 PROCEDURE — 94799 UNLISTED PULMONARY SVC/PX: CPT

## 2019-01-30 PROCEDURE — 81001 URINALYSIS AUTO W/SCOPE: CPT | Performed by: PHYSICIAN ASSISTANT

## 2019-01-30 RX ORDER — PANTOPRAZOLE SODIUM 40 MG/1
40 TABLET, DELAYED RELEASE ORAL
Status: DISCONTINUED | OUTPATIENT
Start: 2019-01-31 | End: 2019-02-01

## 2019-01-30 RX ORDER — ONDANSETRON 4 MG/1
4 TABLET, FILM COATED ORAL EVERY 6 HOURS PRN
Status: DISCONTINUED | OUTPATIENT
Start: 2019-01-30 | End: 2019-02-05 | Stop reason: HOSPADM

## 2019-01-30 RX ORDER — METOCLOPRAMIDE 10 MG/1
10 TABLET ORAL
Status: DISCONTINUED | OUTPATIENT
Start: 2019-01-30 | End: 2019-02-01

## 2019-01-30 RX ORDER — PREDNISONE 10 MG/1
10 TABLET ORAL
Status: DISCONTINUED | OUTPATIENT
Start: 2019-01-31 | End: 2019-02-05 | Stop reason: HOSPADM

## 2019-01-30 RX ORDER — DOCUSATE SODIUM 100 MG/1
100 CAPSULE, LIQUID FILLED ORAL 2 TIMES DAILY
Status: DISCONTINUED | OUTPATIENT
Start: 2019-01-30 | End: 2019-02-05 | Stop reason: HOSPADM

## 2019-01-30 RX ORDER — ALPRAZOLAM 0.5 MG/1
0.5 TABLET ORAL ONCE
Status: COMPLETED | OUTPATIENT
Start: 2019-01-30 | End: 2019-01-30

## 2019-01-30 RX ORDER — MIRTAZAPINE 15 MG/1
15 TABLET, FILM COATED ORAL NIGHTLY
Status: DISCONTINUED | OUTPATIENT
Start: 2019-01-30 | End: 2019-02-05 | Stop reason: HOSPADM

## 2019-01-30 RX ADMIN — ALPRAZOLAM 0.5 MG: 0.5 TABLET ORAL at 13:33

## 2019-01-30 RX ADMIN — FINASTERIDE 5 MG: 5 TABLET, FILM COATED ORAL at 09:21

## 2019-01-30 RX ADMIN — METOCLOPRAMIDE 10 MG: 5 INJECTION, SOLUTION INTRAMUSCULAR; INTRAVENOUS at 08:02

## 2019-01-30 RX ADMIN — HEPARIN SODIUM 5000 UNITS: 5000 INJECTION INTRAVENOUS; SUBCUTANEOUS at 21:23

## 2019-01-30 RX ADMIN — TOBRAMYCIN 1 DROP: 3 SOLUTION/ DROPS OPHTHALMIC at 05:18

## 2019-01-30 RX ADMIN — TAMSULOSIN HYDROCHLORIDE 0.4 MG: 0.4 CAPSULE ORAL at 21:23

## 2019-01-30 RX ADMIN — TRAMADOL HYDROCHLORIDE 50 MG: 50 TABLET, FILM COATED ORAL at 21:23

## 2019-01-30 RX ADMIN — SPIRONOLACTONE 25 MG: 25 TABLET ORAL at 09:21

## 2019-01-30 RX ADMIN — ALBUTEROL SULFATE 2.5 MG: 2.5 SOLUTION RESPIRATORY (INHALATION) at 08:36

## 2019-01-30 RX ADMIN — METOPROLOL TARTRATE 25 MG: 25 TABLET ORAL at 09:21

## 2019-01-30 RX ADMIN — TOBRAMYCIN 1 DROP: 3 SOLUTION/ DROPS OPHTHALMIC at 12:02

## 2019-01-30 RX ADMIN — CLOPIDOGREL BISULFATE 75 MG: 75 TABLET, FILM COATED ORAL at 09:21

## 2019-01-30 RX ADMIN — BUDESONIDE AND FORMOTEROL FUMARATE DIHYDRATE 1 PUFF: 80; 4.5 AEROSOL RESPIRATORY (INHALATION) at 08:36

## 2019-01-30 RX ADMIN — PREDNISONE 20 MG: 20 TABLET ORAL at 08:02

## 2019-01-30 RX ADMIN — TOBRAMYCIN 1 DROP: 3 SOLUTION/ DROPS OPHTHALMIC at 16:39

## 2019-01-30 RX ADMIN — METOCLOPRAMIDE HYDROCHLORIDE 10 MG: 10 TABLET ORAL at 12:02

## 2019-01-30 RX ADMIN — MIRTAZAPINE 15 MG: 15 TABLET, FILM COATED ORAL at 21:23

## 2019-01-30 RX ADMIN — TOBRAMYCIN 1 DROP: 3 SOLUTION/ DROPS OPHTHALMIC at 21:27

## 2019-01-30 RX ADMIN — HEPARIN SODIUM 5000 UNITS: 5000 INJECTION INTRAVENOUS; SUBCUTANEOUS at 05:18

## 2019-01-30 RX ADMIN — OXYBUTYNIN CHLORIDE 5 MG: 5 TABLET ORAL at 21:23

## 2019-01-30 RX ADMIN — TOBRAMYCIN 1 DROP: 3 SOLUTION/ DROPS OPHTHALMIC at 09:21

## 2019-01-30 RX ADMIN — Medication 5 MG: at 21:23

## 2019-01-30 RX ADMIN — ASPIRIN 81 MG: 81 TABLET, COATED ORAL at 09:21

## 2019-01-30 RX ADMIN — METOPROLOL TARTRATE 25 MG: 25 TABLET ORAL at 21:23

## 2019-01-30 RX ADMIN — ALPRAZOLAM 0.5 MG: 0.5 TABLET ORAL at 21:23

## 2019-01-30 RX ADMIN — PANTOPRAZOLE SODIUM 40 MG: 40 INJECTION, POWDER, FOR SOLUTION INTRAVENOUS at 05:18

## 2019-01-30 RX ADMIN — ATORVASTATIN CALCIUM 10 MG: 10 TABLET, FILM COATED ORAL at 21:23

## 2019-01-30 RX ADMIN — DOCUSATE SODIUM 100 MG: 100 CAPSULE, LIQUID FILLED ORAL at 13:34

## 2019-01-30 RX ADMIN — TOBRAMYCIN 1 DROP: 3 SOLUTION/ DROPS OPHTHALMIC at 23:14

## 2019-01-30 RX ADMIN — ESCITALOPRAM OXALATE 10 MG: 10 TABLET ORAL at 09:21

## 2019-01-30 RX ADMIN — HEPARIN SODIUM 5000 UNITS: 5000 INJECTION INTRAVENOUS; SUBCUTANEOUS at 13:36

## 2019-01-30 RX ADMIN — METOCLOPRAMIDE HYDROCHLORIDE 10 MG: 10 TABLET ORAL at 16:39

## 2019-01-31 LAB
ANION GAP SERPL CALCULATED.3IONS-SCNC: 5 MMOL/L (ref 3–11)
BUN BLD-MCNC: 50 MG/DL (ref 9–23)
BUN/CREAT SERPL: 33.3 (ref 7–25)
CALCIUM SPEC-SCNC: 8.8 MG/DL (ref 8.7–10.4)
CHLORIDE SERPL-SCNC: 105 MMOL/L (ref 99–109)
CO2 SERPL-SCNC: 25 MMOL/L (ref 20–31)
CREAT BLD-MCNC: 1.5 MG/DL (ref 0.6–1.3)
DEPRECATED RDW RBC AUTO: 43.6 FL (ref 37–54)
ERYTHROCYTE [DISTWIDTH] IN BLOOD BY AUTOMATED COUNT: 12.7 % (ref 11.3–14.5)
GFR SERPL CREATININE-BSD FRML MDRD: 47 ML/MIN/1.73
GLUCOSE BLD-MCNC: 84 MG/DL (ref 70–100)
GLUCOSE BLDC GLUCOMTR-MCNC: 126 MG/DL (ref 70–130)
HCT VFR BLD AUTO: 34.7 % (ref 38.9–50.9)
HGB BLD-MCNC: 11.2 G/DL (ref 13.1–17.5)
MCH RBC QN AUTO: 30.6 PG (ref 27–31)
MCHC RBC AUTO-ENTMCNC: 32.3 G/DL (ref 32–36)
MCV RBC AUTO: 94.8 FL (ref 80–99)
PLATELET # BLD AUTO: 269 10*3/MM3 (ref 150–450)
PMV BLD AUTO: 8.3 FL (ref 6–12)
POTASSIUM BLD-SCNC: 4 MMOL/L (ref 3.5–5.5)
RBC # BLD AUTO: 3.66 10*6/MM3 (ref 4.2–5.76)
SODIUM BLD-SCNC: 135 MMOL/L (ref 132–146)
WBC NRBC COR # BLD: 9.38 10*3/MM3 (ref 3.5–10.8)

## 2019-01-31 PROCEDURE — 0 IOPAMIDOL PER 1 ML: Performed by: INTERNAL MEDICINE

## 2019-01-31 PROCEDURE — 33208 INSRT HEART PM ATRIAL & VENT: CPT | Performed by: INTERNAL MEDICINE

## 2019-01-31 PROCEDURE — C1898 LEAD, PMKR, OTHER THAN TRANS: HCPCS | Performed by: INTERNAL MEDICINE

## 2019-01-31 PROCEDURE — 99024 POSTOP FOLLOW-UP VISIT: CPT | Performed by: PHYSICIAN ASSISTANT

## 2019-01-31 PROCEDURE — 94799 UNLISTED PULMONARY SVC/PX: CPT

## 2019-01-31 PROCEDURE — 25010000002 ONDANSETRON PER 1 MG: Performed by: INTERNAL MEDICINE

## 2019-01-31 PROCEDURE — C1892 INTRO/SHEATH,FIXED,PEEL-AWAY: HCPCS | Performed by: INTERNAL MEDICINE

## 2019-01-31 PROCEDURE — 02H63JZ INSERTION OF PACEMAKER LEAD INTO RIGHT ATRIUM, PERCUTANEOUS APPROACH: ICD-10-PCS | Performed by: INTERNAL MEDICINE

## 2019-01-31 PROCEDURE — 0JH606Z INSERTION OF PACEMAKER, DUAL CHAMBER INTO CHEST SUBCUTANEOUS TISSUE AND FASCIA, OPEN APPROACH: ICD-10-PCS | Performed by: INTERNAL MEDICINE

## 2019-01-31 PROCEDURE — 85027 COMPLETE CBC AUTOMATED: CPT | Performed by: PHYSICIAN ASSISTANT

## 2019-01-31 PROCEDURE — 63710000001 PREDNISONE PER 5 MG: Performed by: PHYSICIAN ASSISTANT

## 2019-01-31 PROCEDURE — 94660 CPAP INITIATION&MGMT: CPT

## 2019-01-31 PROCEDURE — 25010000002 VANCOMYCIN PER 500 MG: Performed by: INTERNAL MEDICINE

## 2019-01-31 PROCEDURE — 80048 BASIC METABOLIC PNL TOTAL CA: CPT | Performed by: PHYSICIAN ASSISTANT

## 2019-01-31 PROCEDURE — 82962 GLUCOSE BLOOD TEST: CPT

## 2019-01-31 PROCEDURE — 25010000002 FENTANYL CITRATE (PF) 100 MCG/2ML SOLUTION: Performed by: INTERNAL MEDICINE

## 2019-01-31 PROCEDURE — 25010000002 HEPARIN (PORCINE) PER 1000 UNITS: Performed by: INTERNAL MEDICINE

## 2019-01-31 PROCEDURE — 25010000002 MIDAZOLAM PER 1 MG: Performed by: INTERNAL MEDICINE

## 2019-01-31 PROCEDURE — 99232 SBSQ HOSP IP/OBS MODERATE 35: CPT | Performed by: PHYSICIAN ASSISTANT

## 2019-01-31 PROCEDURE — 02HK3JZ INSERTION OF PACEMAKER LEAD INTO RIGHT VENTRICLE, PERCUTANEOUS APPROACH: ICD-10-PCS | Performed by: INTERNAL MEDICINE

## 2019-01-31 PROCEDURE — C1785 PMKR, DUAL, RATE-RESP: HCPCS | Performed by: INTERNAL MEDICINE

## 2019-01-31 PROCEDURE — 99152 MOD SED SAME PHYS/QHP 5/>YRS: CPT | Performed by: INTERNAL MEDICINE

## 2019-01-31 DEVICE — LD PM TENDRIL STS 6F58CM 2088TC58: Type: IMPLANTABLE DEVICE | Status: FUNCTIONAL

## 2019-01-31 DEVICE — LD PM TENDRIL STS 6F52CM 2088TC52: Type: IMPLANTABLE DEVICE | Status: FUNCTIONAL

## 2019-01-31 DEVICE — GEN PM ASSURITY DR RF: Type: IMPLANTABLE DEVICE | Status: FUNCTIONAL

## 2019-01-31 RX ORDER — ONDANSETRON 2 MG/ML
INJECTION INTRAMUSCULAR; INTRAVENOUS AS NEEDED
Status: DISCONTINUED | OUTPATIENT
Start: 2019-01-31 | End: 2019-01-31 | Stop reason: HOSPADM

## 2019-01-31 RX ORDER — VANCOMYCIN HYDROCHLORIDE 1 G/200ML
15 INJECTION, SOLUTION INTRAVENOUS ONCE
Status: COMPLETED | OUTPATIENT
Start: 2019-01-31 | End: 2019-01-31

## 2019-01-31 RX ORDER — SODIUM CHLORIDE 0.9 % (FLUSH) 0.9 %
3 SYRINGE (ML) INJECTION EVERY 12 HOURS SCHEDULED
Status: DISCONTINUED | OUTPATIENT
Start: 2019-01-31 | End: 2019-02-05 | Stop reason: HOSPADM

## 2019-01-31 RX ORDER — BUPIVACAINE HYDROCHLORIDE 5 MG/ML
INJECTION, SOLUTION EPIDURAL; INTRACAUDAL AS NEEDED
Status: DISCONTINUED | OUTPATIENT
Start: 2019-01-31 | End: 2019-01-31 | Stop reason: HOSPADM

## 2019-01-31 RX ORDER — FENTANYL CITRATE 50 UG/ML
INJECTION, SOLUTION INTRAMUSCULAR; INTRAVENOUS AS NEEDED
Status: DISCONTINUED | OUTPATIENT
Start: 2019-01-31 | End: 2019-01-31 | Stop reason: HOSPADM

## 2019-01-31 RX ORDER — ONDANSETRON 2 MG/ML
4 INJECTION INTRAMUSCULAR; INTRAVENOUS EVERY 6 HOURS PRN
Status: DISCONTINUED | OUTPATIENT
Start: 2019-01-31 | End: 2019-01-31 | Stop reason: SDUPTHER

## 2019-01-31 RX ORDER — LIDOCAINE HYDROCHLORIDE 10 MG/ML
INJECTION, SOLUTION INFILTRATION; PERINEURAL AS NEEDED
Status: DISCONTINUED | OUTPATIENT
Start: 2019-01-31 | End: 2019-01-31 | Stop reason: HOSPADM

## 2019-01-31 RX ORDER — SODIUM CHLORIDE 0.9 % (FLUSH) 0.9 %
3-10 SYRINGE (ML) INJECTION AS NEEDED
Status: DISCONTINUED | OUTPATIENT
Start: 2019-01-31 | End: 2019-02-05 | Stop reason: HOSPADM

## 2019-01-31 RX ORDER — MIDAZOLAM HYDROCHLORIDE 1 MG/ML
INJECTION INTRAMUSCULAR; INTRAVENOUS AS NEEDED
Status: DISCONTINUED | OUTPATIENT
Start: 2019-01-31 | End: 2019-01-31 | Stop reason: HOSPADM

## 2019-01-31 RX ORDER — SODIUM CHLORIDE 9 MG/ML
INJECTION, SOLUTION INTRAVENOUS CONTINUOUS PRN
Status: COMPLETED | OUTPATIENT
Start: 2019-01-31 | End: 2019-01-31

## 2019-01-31 RX ADMIN — METOCLOPRAMIDE HYDROCHLORIDE 10 MG: 10 TABLET ORAL at 08:10

## 2019-01-31 RX ADMIN — Medication 5 MG: at 20:22

## 2019-01-31 RX ADMIN — TOBRAMYCIN 1 DROP: 3 SOLUTION/ DROPS OPHTHALMIC at 20:22

## 2019-01-31 RX ADMIN — ALPRAZOLAM 0.5 MG: 0.5 TABLET ORAL at 20:23

## 2019-01-31 RX ADMIN — TAMSULOSIN HYDROCHLORIDE 0.4 MG: 0.4 CAPSULE ORAL at 20:22

## 2019-01-31 RX ADMIN — DOCUSATE SODIUM 100 MG: 100 CAPSULE, LIQUID FILLED ORAL at 20:23

## 2019-01-31 RX ADMIN — MIRTAZAPINE 15 MG: 15 TABLET, FILM COATED ORAL at 20:23

## 2019-01-31 RX ADMIN — HEPARIN SODIUM 5000 UNITS: 5000 INJECTION INTRAVENOUS; SUBCUTANEOUS at 14:35

## 2019-01-31 RX ADMIN — METOCLOPRAMIDE HYDROCHLORIDE 10 MG: 10 TABLET ORAL at 12:16

## 2019-01-31 RX ADMIN — SODIUM CHLORIDE, PRESERVATIVE FREE 3 ML: 5 INJECTION INTRAVENOUS at 20:24

## 2019-01-31 RX ADMIN — ASPIRIN 81 MG: 81 TABLET, COATED ORAL at 08:12

## 2019-01-31 RX ADMIN — PREDNISONE 10 MG: 10 TABLET ORAL at 08:12

## 2019-01-31 RX ADMIN — HEPARIN SODIUM 5000 UNITS: 5000 INJECTION INTRAVENOUS; SUBCUTANEOUS at 06:00

## 2019-01-31 RX ADMIN — METOPROLOL TARTRATE 25 MG: 25 TABLET ORAL at 20:23

## 2019-01-31 RX ADMIN — TOBRAMYCIN 1 DROP: 3 SOLUTION/ DROPS OPHTHALMIC at 06:03

## 2019-01-31 RX ADMIN — ATORVASTATIN CALCIUM 10 MG: 10 TABLET, FILM COATED ORAL at 20:22

## 2019-01-31 RX ADMIN — METOPROLOL TARTRATE 25 MG: 25 TABLET ORAL at 08:10

## 2019-01-31 RX ADMIN — HEPARIN SODIUM 5000 UNITS: 5000 INJECTION INTRAVENOUS; SUBCUTANEOUS at 20:23

## 2019-01-31 RX ADMIN — TRAMADOL HYDROCHLORIDE 50 MG: 50 TABLET, FILM COATED ORAL at 17:22

## 2019-01-31 RX ADMIN — METOCLOPRAMIDE HYDROCHLORIDE 10 MG: 10 TABLET ORAL at 17:17

## 2019-01-31 RX ADMIN — PANTOPRAZOLE SODIUM 40 MG: 40 TABLET, DELAYED RELEASE ORAL at 08:10

## 2019-01-31 RX ADMIN — VANCOMYCIN HYDROCHLORIDE 1000 MG: 1 INJECTION, SOLUTION INTRAVENOUS at 20:22

## 2019-01-31 RX ADMIN — DOCUSATE SODIUM 100 MG: 100 CAPSULE, LIQUID FILLED ORAL at 08:12

## 2019-01-31 RX ADMIN — BUDESONIDE AND FORMOTEROL FUMARATE DIHYDRATE 1 PUFF: 80; 4.5 AEROSOL RESPIRATORY (INHALATION) at 13:44

## 2019-01-31 RX ADMIN — CLOPIDOGREL BISULFATE 75 MG: 75 TABLET, FILM COATED ORAL at 08:10

## 2019-01-31 RX ADMIN — SPIRONOLACTONE 25 MG: 25 TABLET ORAL at 08:12

## 2019-01-31 RX ADMIN — TOBRAMYCIN 1 DROP: 3 SOLUTION/ DROPS OPHTHALMIC at 17:17

## 2019-01-31 RX ADMIN — ONDANSETRON 4 MG: 4 TABLET, FILM COATED ORAL at 14:40

## 2019-01-31 RX ADMIN — OXYBUTYNIN CHLORIDE 5 MG: 5 TABLET ORAL at 20:23

## 2019-01-31 RX ADMIN — FINASTERIDE 5 MG: 5 TABLET, FILM COATED ORAL at 08:12

## 2019-01-31 RX ADMIN — ESCITALOPRAM OXALATE 10 MG: 10 TABLET ORAL at 08:12

## 2019-01-31 RX ADMIN — TOBRAMYCIN 1 DROP: 3 SOLUTION/ DROPS OPHTHALMIC at 08:12

## 2019-01-31 RX ADMIN — VANCOMYCIN HYDROCHLORIDE 1000 MG: 1 INJECTION, SOLUTION INTRAVENOUS at 09:03

## 2019-02-01 ENCOUNTER — APPOINTMENT (OUTPATIENT)
Dept: GENERAL RADIOLOGY | Facility: HOSPITAL | Age: 69
End: 2019-02-01

## 2019-02-01 PROCEDURE — 99024 POSTOP FOLLOW-UP VISIT: CPT | Performed by: PHYSICIAN ASSISTANT

## 2019-02-01 PROCEDURE — 94799 UNLISTED PULMONARY SVC/PX: CPT

## 2019-02-01 PROCEDURE — 97530 THERAPEUTIC ACTIVITIES: CPT

## 2019-02-01 PROCEDURE — 93010 ELECTROCARDIOGRAM REPORT: CPT | Performed by: INTERNAL MEDICINE

## 2019-02-01 PROCEDURE — 25010000002 ONDANSETRON PER 1 MG: Performed by: PHYSICIAN ASSISTANT

## 2019-02-01 PROCEDURE — 93005 ELECTROCARDIOGRAM TRACING: CPT | Performed by: INTERNAL MEDICINE

## 2019-02-01 PROCEDURE — 97535 SELF CARE MNGMENT TRAINING: CPT

## 2019-02-01 PROCEDURE — 97110 THERAPEUTIC EXERCISES: CPT

## 2019-02-01 PROCEDURE — 97116 GAIT TRAINING THERAPY: CPT

## 2019-02-01 PROCEDURE — 63710000001 PREDNISONE PER 5 MG: Performed by: PHYSICIAN ASSISTANT

## 2019-02-01 PROCEDURE — 25010000002 HEPARIN (PORCINE) PER 1000 UNITS: Performed by: INTERNAL MEDICINE

## 2019-02-01 PROCEDURE — 99232 SBSQ HOSP IP/OBS MODERATE 35: CPT | Performed by: NURSE PRACTITIONER

## 2019-02-01 PROCEDURE — 71046 X-RAY EXAM CHEST 2 VIEWS: CPT

## 2019-02-01 RX ORDER — PANTOPRAZOLE SODIUM 40 MG/1
40 TABLET, DELAYED RELEASE ORAL
Status: DISCONTINUED | OUTPATIENT
Start: 2019-02-02 | End: 2019-02-05 | Stop reason: HOSPADM

## 2019-02-01 RX ORDER — METOCLOPRAMIDE 10 MG/1
10 TABLET ORAL
Status: DISCONTINUED | OUTPATIENT
Start: 2019-02-02 | End: 2019-02-05 | Stop reason: HOSPADM

## 2019-02-01 RX ADMIN — TAMSULOSIN HYDROCHLORIDE 0.4 MG: 0.4 CAPSULE ORAL at 20:42

## 2019-02-01 RX ADMIN — OXYBUTYNIN CHLORIDE 5 MG: 5 TABLET ORAL at 20:42

## 2019-02-01 RX ADMIN — FINASTERIDE 5 MG: 5 TABLET, FILM COATED ORAL at 08:44

## 2019-02-01 RX ADMIN — PANTOPRAZOLE SODIUM 40 MG: 40 TABLET, DELAYED RELEASE ORAL at 08:45

## 2019-02-01 RX ADMIN — ASPIRIN 81 MG: 81 TABLET, COATED ORAL at 08:45

## 2019-02-01 RX ADMIN — TOBRAMYCIN 1 DROP: 3 SOLUTION/ DROPS OPHTHALMIC at 16:52

## 2019-02-01 RX ADMIN — BUDESONIDE AND FORMOTEROL FUMARATE DIHYDRATE 1 PUFF: 80; 4.5 AEROSOL RESPIRATORY (INHALATION) at 08:54

## 2019-02-01 RX ADMIN — HEPARIN SODIUM 5000 UNITS: 5000 INJECTION INTRAVENOUS; SUBCUTANEOUS at 16:51

## 2019-02-01 RX ADMIN — METOCLOPRAMIDE HYDROCHLORIDE 10 MG: 10 TABLET ORAL at 16:51

## 2019-02-01 RX ADMIN — SODIUM CHLORIDE, PRESERVATIVE FREE 3 ML: 5 INJECTION INTRAVENOUS at 08:45

## 2019-02-01 RX ADMIN — POLYETHYLENE GLYCOL 3350 17 G: 17 POWDER, FOR SOLUTION ORAL at 08:44

## 2019-02-01 RX ADMIN — METOPROLOL TARTRATE 25 MG: 25 TABLET ORAL at 20:43

## 2019-02-01 RX ADMIN — ATORVASTATIN CALCIUM 10 MG: 10 TABLET, FILM COATED ORAL at 20:42

## 2019-02-01 RX ADMIN — HEPARIN SODIUM 5000 UNITS: 5000 INJECTION INTRAVENOUS; SUBCUTANEOUS at 20:42

## 2019-02-01 RX ADMIN — TRAMADOL HYDROCHLORIDE 50 MG: 50 TABLET, FILM COATED ORAL at 20:42

## 2019-02-01 RX ADMIN — DOCUSATE SODIUM 100 MG: 100 CAPSULE, LIQUID FILLED ORAL at 08:44

## 2019-02-01 RX ADMIN — TOBRAMYCIN 1 DROP: 3 SOLUTION/ DROPS OPHTHALMIC at 20:42

## 2019-02-01 RX ADMIN — ONDANSETRON 4 MG: 2 INJECTION INTRAMUSCULAR; INTRAVENOUS at 09:11

## 2019-02-01 RX ADMIN — SODIUM CHLORIDE, PRESERVATIVE FREE 3 ML: 5 INJECTION INTRAVENOUS at 20:43

## 2019-02-01 RX ADMIN — METOCLOPRAMIDE HYDROCHLORIDE 10 MG: 10 TABLET ORAL at 08:45

## 2019-02-01 RX ADMIN — ESCITALOPRAM OXALATE 10 MG: 10 TABLET ORAL at 08:44

## 2019-02-01 RX ADMIN — TOBRAMYCIN 1 DROP: 3 SOLUTION/ DROPS OPHTHALMIC at 08:44

## 2019-02-01 RX ADMIN — ALPRAZOLAM 0.5 MG: 0.5 TABLET ORAL at 18:44

## 2019-02-01 RX ADMIN — BACLOFEN 10 MG: 10 TABLET ORAL at 20:42

## 2019-02-01 RX ADMIN — DOCUSATE SODIUM 100 MG: 100 CAPSULE, LIQUID FILLED ORAL at 20:43

## 2019-02-01 RX ADMIN — Medication 5 MG: at 20:43

## 2019-02-01 RX ADMIN — TOBRAMYCIN 1 DROP: 3 SOLUTION/ DROPS OPHTHALMIC at 12:30

## 2019-02-01 RX ADMIN — CLOPIDOGREL BISULFATE 75 MG: 75 TABLET, FILM COATED ORAL at 08:44

## 2019-02-01 RX ADMIN — METOCLOPRAMIDE HYDROCHLORIDE 10 MG: 10 TABLET ORAL at 12:30

## 2019-02-01 RX ADMIN — SPIRONOLACTONE 25 MG: 25 TABLET ORAL at 08:44

## 2019-02-01 RX ADMIN — PREDNISONE 10 MG: 10 TABLET ORAL at 08:44

## 2019-02-01 RX ADMIN — MIRTAZAPINE 15 MG: 15 TABLET, FILM COATED ORAL at 20:43

## 2019-02-01 RX ADMIN — METOPROLOL TARTRATE 25 MG: 25 TABLET ORAL at 08:45

## 2019-02-01 RX ADMIN — HEPARIN SODIUM 5000 UNITS: 5000 INJECTION INTRAVENOUS; SUBCUTANEOUS at 05:05

## 2019-02-01 RX ADMIN — TOBRAMYCIN 1 DROP: 3 SOLUTION/ DROPS OPHTHALMIC at 05:05

## 2019-02-01 NOTE — PROGRESS NOTES
Continued Stay Note  [facility]     Patient Name: Jani Pena  MRN: [id number]  Today's Date: 2/1/2019    Admit Date: 1/22/2019    Discharge Plan     Row Name 02/01/19 1133       Plan    Plan Comments  I met with Mr. Pena at the bedside. He states that he is thinking he may need rehab after discharge. He is requesting that I send a referral to [facility] as he has been there in the past. Referral faxed. He willl need insurance approval.    Final Discharge Disposition Code  03 - skilled nursing facility (SNF)        Discharge Codes    No documentation.             Bam Zepeda

## 2019-02-01 NOTE — THERAPY TREATMENT NOTE
Acute Care - Physical Therapy Treatment Note  [facility]     Patient Name: Jani Pena  : 1950  MRN: [id number]  Today's Date: 2019  Onset of Illness/Injury or Date of Surgery: 19  Date of Referral to PT: 19  Referring Physician: PAL Trejo    Admit Date: 2019    Visit Dx:    ICD-10-CM ICD-9-CM   1. Impaired functional mobility, balance, gait, and endurance Z74.09 V49.89   2. Oral phase dysphagia R13.11 787.21   3. Impaired mobility and ADLs Z74.09 799.89   4. Persistent atrial fibrillation (CMS/HCC) I48.1 427.31   5. Chronic systolic congestive heart failure (CMS/HCC) I50.22 428.22     428.0   6. Ventricular tachycardia (CMS/HCC) I47.2 427.1   7. Type 2 diabetes mellitus with complication, without long-term current use of insulin (CMS/[facility]) E11.8 250.90   8. Physical deconditioning R53.81 799.3   9. Nausea R11.0 787.02   10. Esophageal dysphagia R13.10 787.20     Patient Active Problem List   Diagnosis   • Anxiety   • Atherosclerotic heart disease of native coronary artery without angina pectoris   • Atrial fibrillation (CMS/HCC)   • Chronic kidney disease, stage III (moderate) (CMS/HCC)   • Steroid-dependent COPD (CMS/HCC)   • Degeneration of cervical intervertebral disc   • Diabetes mellitus (CMS/HCC)   • GERD without esophagitis   • Diverticulosis   • Hemorrhoids   • Hiatal hernia   • Hyperlipidemia   • Hypertension   • Kyphosis, acquired   • Mitral and aortic valve disease   • Sleep apnea   • Enlarged prostate without lower urinary tract symptoms (luts)   • History of arthritis   • Back pain   • Depression   • Stroke syndrome   • History of ventricular septal defect   • Neck pain   • Impaired mobility and ADLs   • Physical deconditioning   • Chronic systolic congestive heart failure (CMS/HCC)   • Atherosclerosis of native coronary artery of native heart with unstable angina pectoris (CMS/HCC)   • Lumbar radiculopathy   • Dysphagia   • Heartburn   • Anemia   • Syncope and  collapse   • Ventricular tachycardia (CMS/HCC)   • N&V (nausea and vomiting)   • Scalp laceration   • Nausea   • Esophageal dysphagia       Therapy Treatment    Rehabilitation Treatment Summary     Row Name 02/01/19 0822             Treatment Time/Intention    Discipline  physical therapy assistant  -AS      Document Type  therapy note (daily note)  -AS      Subjective Information  complains of;weakness;fatigue;pain  -AS      Mode of Treatment  physical therapy  -AS      Patient/Family Observations  no family at bedside, patient supine with sling for pacemaker  -AS      Patient Effort  good  -AS      Comment  repostioned sling prior to OOB, nsg removed once back from walk.  -AS      Existing Precautions/Restrictions  fall;oxygen therapy device and L/min;pacemaker  -AS      Recorded by [AS] Nicole Daniel, [facility] 02/01/19 0947      Row Name 02/01/19 0822             Vital Signs    Pre SpO2 (%)  95  -AS      O2 Delivery Pre Treatment  supplemental O2  -AS      Intra SpO2 (%)  88  -AS      O2 Delivery Intra Treatment  supplemental O2  -AS      Post SpO2 (%)  94  -AS      O2 Delivery Post Treatment  supplemental O2  -AS      Pre Patient Position  Supine  -AS      Intra Patient Position  Standing  -AS      Post Patient Position  Sitting  -AS      Recorded by [AS] Nicole Daniel, PTA 02/01/19 0947      Row Name 02/01/19 0822             Cognitive Assessment/Intervention- PT/OT    Affect/Mental Status (Cognitive)  WFL  -AS      Orientation Status (Cognition)  oriented x 4  -AS      Follows Commands (Cognition)  WFL  -AS      Safety Deficit (Cognitive)  mild deficit  -AS      Personal Safety Interventions  fall prevention program maintained;gait belt;nonskid shoes/slippers when out of bed  -AS      Recorded by [AS] Nicole Daniel [facility] 02/01/19 0947      Row Name 02/01/19 0822             Bed Mobility Assessment/Treatment    Supine-Sit [place] (Bed Mobility)  verbal cues;supervision  -AS      Comment (Bed  Mobility)  cues for pacemaker protection with L UE, in sling.  -AS      Recorded by [AS] Nicole Daniel, PTA 02/01/19 0947      Row Name 02/01/19 0822             Sit-Stand Transfer    Sit-Stand [place] (Transfers)  verbal cues;contact guard  -AS      Assistive Device (Sit-Stand Transfers)  other (see comments) no AD  -AS      Recorded by [AS] Nicole Daniel, PTA 02/01/19 0947      Row Name 02/01/19 0822             Stand-Sit Transfer    Stand-Sit [place] (Transfers)  verbal cues;contact guard  -AS      Assistive Device (Stand-Sit Transfers)  other (see comments) no AD, L UE in sling for comfort per pts request  -AS      Recorded by [AS] Nicole Daniel, PTA 02/01/19 0947      Row Name 02/01/19 0822             Gait/Stairs Assessment/Training    45642 - Gait Training Minutes   15  -AS      Gait/Stairs Assessment/Training  gait/ambulation assistive device  -AS      [place] Level (Gait)  verbal cues;contact guard  -AS      Assistive Device (Gait)  other (see comments) none  -AS      Distance in Feet (Gait)  220  -AS      Pattern (Gait)  step-through  -AS      Deviations/Abnormal Patterns (Gait)  bilateral deviations;base of support, narrow;pati decreased;gait speed decreased  -AS      Comment (Gait/Stairs)  patient ambulated on 3 liters O2 per NC, some SOA with activity but was able to increase gait distance.  -AS      Recorded by [AS] Nicole Daniel, PTA 02/01/19 0947      Row Name 02/01/19 0822             Motor Skills Assessment/Interventions    Additional Documentation  Therapeutic Exercise (Group)  -AS      Recorded by [AS] Nicole Daniel, PTA 02/01/19 0947      Row Name 02/01/19 0822             Therapeutic Exercise    76991 - PT Therapeutic Exercise Minutes  8  -AS      Recorded by [AS] Nicole Daniel, PTA 02/01/19 0947      Row Name 02/01/19 0822             Therapeutic Exercise    Lower Extremity Range of Motion (Therapeutic Exercise)  hip flexion/extension,  bilateral;knee flexion/extension, bilateral;ankle dorsiflexion/plantar flexion, bilateral  -AS      Exercise Type (Therapeutic Exercise)  AROM (active range of motion)  -AS      Position (Therapeutic Exercise)  seated  -AS      Sets/Reps (Therapeutic Exercise)  1/10  -AS      Recorded by [AS] Nicole Daniel, [facility] 02/01/19 0947      Row Name 02/01/19 0822             Positioning and Restraints    Pre-Treatment Position  in bed  -AS      Post Treatment Position  chair  -AS      In Chair  reclined;call light within reach;encouraged to call for assist;waffle cushion;with nsg  -AS      Recorded by [AS] Nicole Daniel, PTA 02/01/19 0947      Row Name 02/01/19 0822             Pain Scale: Numbers Pre/Post-Treatment    Pain Scale: Numbers, Pretreatment  3/10  -AS      Pain Scale: Numbers, Post-Treatment  3/10  -AS      Pain Location - Side  Left  -AS      Pain Location  -- pacemaker site  -AS      Pain Intervention(s)  Repositioned;Ambulation/increased activity  -AS      Recorded by [AS] Nicole Daniel, [facility] 02/01/19 0947      Row Name                Wound 01/21/19 2000 Left posterior head laceration    Wound - Properties Group Date first assessed: 01/21/19 [PAUL] Time first assessed: 2000 [PAUL] Present On Admission : yes [PAUL] Side: Left [PAUL] Orientation: posterior [PAUL] Location: head [PAUL] Type: laceration [PAUL] Number of Staples Placed: 6 [KC2] Recorded by:  [PAUL] Cyn Jaquez RN 01/22/19 0432 [KC2] Cyn Jaquez RN 01/22/19 0435    Row Name                Wound 01/22/19 2008 Left elbow abrasion    Wound - Properties Group Date first assessed: 01/22/19 [CC] Time first assessed: 2008 [CC] Present On Admission : yes [CC] Side: Left [CC] Location: elbow [CC] Type: abrasion [CC] Recorded by:  [CC] Erica Lam RN 01/22/19 8829    Row Name                Wound 01/26/19 2000 coccyx other (see comments)    Wound - Properties Group Date first assessed: 01/26/19 [LL] Time first assessed: 2000 [LL] Present On  Admission : no [LL] Location: coccyx [LL] Type: other (see comments) [LL], RED PRESSURE AREA  Additional Comments: BILATERAL MEDIAL, UPPER ISCHIAL AND SACRO-COCCYGEAL [LL] Recorded by:  [LL] Tran Page, RN 01/27/19 0048    Row Name                Wound 01/31/19 0939 Left upper chest incision;surgical    Wound - Properties Group Date first assessed: 01/31/19 [LLA] Time first assessed: 0939 [LLA] Present On Admission : no [LLA] Side: Left [LLA] Orientation: upper [LLA] Location: chest [LLA] Type: incision;surgical [LLA] Recorded by:  [LLA] Janice Montelongo, RN 01/31/19 0940      User Key  (r) = Recorded By, (t) = Taken By, (c) = Cosigned By    Initials Name Effective Dates Discipline    AS Nicole Daniel, HEL[facility]IO 06/22/15 -  PT    Janice Nicholas, RN 06/16/16 -  Nurse    CC Erica Lam RN 06/16/16 -  Nurse    LL Tran Page RN 06/16/16 -  Nurse    Cyn Leo RN 02/22/17 -  Nurse          Wound 01/21/19 2000 Left posterior head laceration (Active)   Closure Open to air 1/31/2019  8:25 PM   Periwound edematous;ecchymotic;dry 1/31/2019  8:25 PM   Periwound Temperature warm 1/31/2019  8:25 PM   Periwound Skin Turgor soft 1/31/2019  8:25 PM   Drainage Amount none 1/31/2019  8:25 PM       Wound 01/22/19 2008 Left elbow abrasion (Active)   Closure Open to air 1/31/2019  8:25 PM   Base scab;dry 1/31/2019  8:25 PM   Periwound dry;redness;blanchable 1/31/2019  8:25 PM   Periwound Temperature warm 1/31/2019  8:25 PM   Periwound Skin Turgor soft 1/31/2019  8:25 PM   Edges irregular 1/31/2019  8:25 PM   Drainage Amount none 1/31/2019  8:25 PM       Wound 01/26/19 2000 coccyx other (see comments) (Active)   Dressing Appearance dressing loose 1/31/2019  8:25 PM   Base dry;clean;pink 1/31/2019  8:25 PM   Periwound dry;blanchable;redness 1/31/2019  8:25 PM   Periwound Temperature warm 1/31/2019  8:25 PM   Periwound Skin Turgor soft 1/31/2019  8:25 PM   Drainage Amount none 1/31/2019  8:25 PM   Dressing Care,  Wound dressing removed 1/31/2019  8:25 PM       Wound 01/31/19 0939 Left upper chest incision;surgical (Active)   Dressing Appearance dry;intact 1/31/2019 12:00 PM   Closure SELENE 1/31/2019 12:00 PM   Periwound intact;warm;dry 1/31/2019 11:15 AM   Periwound Temperature warm 1/31/2019 11:15 AM   Drainage Amount none 1/31/2019 11:15 AM           Physical Therapy Education     Title: PT OT SLP Therapies (Not Started)     Topic: Physical Therapy (In Progress)     Point: Mobility training (In Progress)     Learning Progress Summary           Patient Acceptance, E, NR by AS at 2/1/2019  9:47 AM    Eager, E,D, NR by LUKASZ at 1/30/2019  1:26 PM    Acceptance, E, NR,VU by  at 1/28/2019 10:47 AM    Comment:  Pt educated on POC, importance of OOB activity and safe functional mobility.    Acceptance, E,D, NR by KRISTAL at 1/27/2019 10:24 AM    Eager, E,D, NR by LUKASZ at 1/23/2019 11:28 AM   Family Eager, E,D, NR by LUKASZ at 1/23/2019 11:28 AM                   Point: Home exercise program (In Progress)     Learning Progress Summary           Patient Acceptance, E, NR by AS at 2/1/2019  9:47 AM    Eager, E,D, NR by LUKASZ at 1/30/2019  1:26 PM    Acceptance, E, NR,VU by  at 1/28/2019 10:47 AM    Comment:  Pt educated on POC, importance of OOB activity and safe functional mobility.    Acceptance, E,D, NR by KRISTAL at 1/27/2019 10:24 AM    Eager, E,D, NR by DM at 1/23/2019 11:28 AM   Family Eager, E,D, NR by LUKASZ at 1/23/2019 11:28 AM                   Point: Body mechanics (In Progress)     Learning Progress Summary           Patient Acceptance, E, NR by AS at 2/1/2019  9:47 AM    Acceptance, E, VU by MISHEL at 2/1/2019  1:24 AM    Eager, E,D, NR by DM at 1/30/2019  1:26 PM    Acceptance, E, NR,VU by  at 1/28/2019 10:47 AM    Comment:  Pt educated on POC, importance of OOB activity and safe functional mobility.    CAROL ANN Al D, NR by SJ at 1/27/2019 10:24 AM    CAROL ANN Pollard D, NR by DM at 1/23/2019 11:28 AM   Family CAROL ANN Pollard D, NR by DM at 1/23/2019  11:28 AM                   Point: Precautions (In Progress)     Learning Progress Summary           Patient Acceptance, E, NR by AS at 2/1/2019  9:47 AM    Eager, CAROL ANN,D, NR by DM at 1/30/2019  1:26 PM    Acceptance, E, NR,VU by JR at 1/28/2019 10:47 AM    Comment:  Pt educated on POC, importance of OOB activity and safe functional mobility.    Acceptance, E,D, NR by KRISTAL at 1/27/2019 10:24 AM    Eagtalia, CAROL ANN,D, NR by DM at 1/23/2019 11:28 AM   Family Eagtalia, CAROL ANN,D, NR by LUKASZ at 1/23/2019 11:28 AM                               User Key     Initials Effective Dates Name Provider Type Discipline     06/19/15 -  Anna Ibanez, PT Physical Therapist PT    DM 06/19/15 -  Carmita Venegas, PT Physical Therapist PT    AS 06/22/15 -  Nicole Daniel, PTA Physical Therapy Assistant PT     06/16/16 -  Edouard Gómez, RN Registered Nurse Nurse     12/06/18 -  Mable Mcclain, PT Student PT Student PT                PT Recommendation and Plan     Plan of Care Reviewed With: patient  Progress: improving  Outcome Summary: patient ambulated 220 feet with CGA and 3 liters per NC. Gait distance increased with nno increase in pain or SOA.  Outcome Measures     Row Name 02/01/19 0822 01/30/19 1137 01/29/19 1337       How much help from another person do you currently need...    Turning from your back to your side while in flat bed without using bedrails?  4  -AS  4  -DM  --    Moving from lying on back to sitting on the side of a flat bed without bedrails?  4  -AS  4  -DM  --    Moving to and from a bed to a chair (including a wheelchair)?  3  -AS  3  -DM  --    Standing up from a chair using your arms (e.g., wheelchair, bedside chair)?  3  -AS  4  -DM  --    Climbing 3-5 steps with a railing?  3  -AS  3  -DM  --    To walk in hospital room?  3  -AS  3  -DM  --    AM-PAC 6 Clicks Score  20  -AS  21  -DM  --       How much help from another is currently needed...    Putting on and taking off regular lower body clothing?  --  --  3   -TA    Bathing (including washing, rinsing, and drying)  --  --  3  -TA    Toileting (which includes using toilet bed pan or urinal)  --  --  4  -TA    Putting on and taking off regular upper body clothing  --  --  4  -TA    Taking care of personal grooming (such as brushing teeth)  --  --  4  -TA    Eating meals  --  --  4  -TA    Score  --  --  22  -TA       Functional Assessment    Outcome Measure Options  AM-PAC 6 Clicks Basic Mobility (PT)  -AS  AM-PAC 6 Clicks Basic Mobility (PT)  -DM  AM-PAC 6 Clicks Daily Activity (OT)  -TA      User Key  (r) = Recorded By, (t) = Taken By, (c) = Cosigned By    Initials Name Provider Type    DM Carmita Venegas, PT Physical Therapist    AS Nicole Daniel, [facility]IO Physical Therapy Assistant    TA Kamron Inman, OT Occupational Therapist         Time Calculation:   PT Charges     Row Name 02/01/19 0822             Time Calculation    Start Time  0822  -AS      PT Received On  02/01/19  -AS      PT Goal Re-Cert Due Date  02/02/19  -AS         Timed Charges    72456 - PT Therapeutic Exercise Minutes  8  -AS      52014 - Gait Training Minutes   15  -AS        User Key  (r) = Recorded By, (t) = Taken By, (c) = Cosigned By    Initials Name Provider Type    AS Nicole Daniel, [facility] Physical Therapy Assistant        Therapy Suggested Charges     Code   Minutes Charges    91041 (CPT®) Hc Pt Neuromusc Re Education Ea 15 Min      47830 (CPT®) Hc Pt Ther Proc Ea 15 Min 8 1    14294 (CPT®) Hc Gait Training Ea 15 Min 15 1    51359 (CPT®) Hc Pt Therapeutic Act Ea 15 Min      03505 (CPT®) Hc Pt Manual Therapy Ea 15 Min      79909 (CPT®) Hc Pt Iontophoresis Ea 15 Min      37230 (CPT®) Hc Pt Elec Stim Ea-Per 15 Min      49057 (CPT®) Hc Pt Ultrasound Ea 15 Min      23044 (CPT®) Hc Pt Self Care/Mgmt/Train Ea 15 Min      98129 (CPT®) Hc Pt Prosthetic (S) Train Initial Encounter, Each 15 Min      08130 (CPT®) Hc Pt Orthotic(S)/Prosthetic(S) Encounter, Each 15 Min      24500 (CPT®)   Orthotic(S) Mgmt/Train Initial Encounter, Each 15min      Total  23 2        Therapy Charges for Today     Code Description Service Date Service Provider Modifiers Qty    07432661986 HC PT THER PROC EA 15 MIN 2/1/2019 Nicole Daniel, PTA GP 1    71374750952 HC GAIT TRAINING EA 15 MIN 2/1/2019 Nicole Daniel, HELADIO GP 1          PT G-Codes  Outcome Measure Options: AM-PAC 6 Clicks Basic Mobility (PT)  AM-PAC 6 Clicks Score: 20  Score: 22    Nicole Daniel PTA  2/1/2019

## 2019-02-01 NOTE — DISCHARGE PLACEMENT REQUEST
"Case management [phone]  Needs short term skilled bed  Karina Pena (68 y.o. Male)     Date of Birth Social Security Number Address Home Phone MRN    1950  [address] [facility] [address] [phone] [id number]    [employer] Mar[employer] Status          Jainism        Admission Date Admission Type Admitting Provider Attending Provider Department, Room/Bed    1/22/19 Urgent [address]ito [address] MD Russell, Marc P, MD [facility] 4H, S479/1    Discharge Date Discharge Disposition Discharge Destination                       Attending Provider:  Fito Street MD    Allergies:  Mucomyst [Acetylcysteine], Milk-related Compounds, Sulfa Antibiotics    Isolation:  None   Infection:  None   Code Status:  CPR    Ht:  172.7 cm (68\")   Wt:  74.8 kg (165 lb)    Admission Cmt:  None   Principal Problem:  None                Active Insurance as of 1/22/2019     Primary Coverage     Payor Plan Insurance Group Employer/Plan Group    [facility] MEDICARE REPLACEMENT [facility] [id number]     Payor Plan Address Payor Plan Phone Number Payor Plan Fax Number Effective Dates    [address] [address]   1/1/2016 - None Entered    [facility] [id number]       Subscriber Name Subscriber Birth Date Member ID       KARINA PENA 1950 [id number]                 Emergency Contacts      (Rel.) Home Phone Work Phone Mobile Phone    April Easton (Daughter) [phone] -- [phone]    JeanJean Marie (Brother) [phone] -- [phone]    PenaMinda palomo (Mother) [phone] -- [phone]               History & Physical      Omar Encinas DO at 1/22/2019  2:01 PM          [facility]  Cardiovascular Consultation Note      Karina Pena  S479/1  [id number]  1950    DATE OF ADMISSION: 1/22/2019  DATE OF CONSULTATION:  01/22/19    Miguel Rojas MD    Chief complaint/ Reason for Consultation: Ventricular " tachycardia    Problem List:   1. CAD  1. 1994, 2008- [facility] nonobstr  2. 2011 SE wnl  3. [facility] 5/2018: mild diffuse atherosclerosis with no significant flow-limiting obstructive coronary artery disease.  2. Abnl Echo  1. 2013 EF 45 w BBB  2. 1/3/18 echo: EF 45-50%, moderate LA enlargement, mitral thickening with mild MR, sclerosed aortic valve with mild AI, mild TR, apical segmental hypokinesis  3. 2/21/19: EF 45%, moderate LA enlargement, mild AI, mild MR, mild TR, mild dilation of RV, severe hypokinesis of inferior and septal walls, moderate dilation, borderline LVH.  3. CVD  1. B CEA -[facility] remote  2. CUS [facility] 2014 nonobst  4. HTN  5. HL  1. 6/14 137/149/42/65  6. CKD  7. PEDRO  8. COPD 4 Liters at home  9. PFO closure Dr Alves      History of Present Illness: Mr. Pena is a 67 y/o male with the above noted past medical history who was transferred to [facility] from [facility] for non sustained ventricular tachycardia. He presented to [facility] on 1/20 after having a syncopal episode. He reports walking to kitchen, felt a faint feeling then woke up on the floor bleeding from his head. Also, that day just was noted to not feeling well with a upset stomach. He says the other episode was on a day when he didn't feel great as well. At times deals with hypoxia and increased to 4 L about one year prior.  Upon arrival EKG reportedly demonstrated bifascicular block with frequent PVCs/PACs ST changes in anterior leads but cited 12/2018 as well and not significantly changed. There was clearly concern for cardiac origin. For the most part labs were unrevealing except for a mild leak in troponin. He had CT heads which were all unremarkable for an acute issue. On the night of 1/21 he developed a run of NSVT per report and the decision was made to send patient here. Of note, he has h/o non obstructive CAD with most recent [facility] on 5/2018 with mild diffuse disease but there was apparently question of vasospasms at that time.  Echo  1/21/2019 showed EF 45%, moderate LA enlargement, mild MR, mild AI, mild TR. EKG with RBBB, LAFB and HRs ok.     He states SOB is chronic and unchanged s/t COPD but at times with hypoxia and in the past year increased to 4 L O2. He denies any chest pains. Reports one other episode of syncope with injury to face a/w faint feeling which occurred a few minutes after standing up. Denies palpitations, PND, orthopnea.   He does state last night he was wearing his CPAP and he forgot to turn on the oxygen so his oxygenation dropped into the 70s per the patient's knowledge.  That's when noted 4-5 beat run of nonsustained VT however no documentation other than what the patient tells me and was written on cards note. He says that not one particular part of the night was worse than the other however he did just have a bad night yesterday where he felt short of breath and just not himself.  There was no significant bradycardia or other issues with tachycardia per the patient's remembrance.        Past Medical History:   Diagnosis Date   • Abnormal liver enzymes    • Anemia    • Anxiety    • Arthritis    • Atherosclerotic heart disease of native coronary artery without angina pectoris    • Atrial fibrillation (CMS/HCC)    • Back pain    • BPH (benign prostatic hyperplasia)    • Cataract, bilateral    • Chest pain     2-3 MONTHS AGO.  PER PT, HAS ADDRESSED WITH CARDIOLOGIST.  STATES HAS NTG PRN.   • CHF (congestive heart failure) (CMS/HCC)    • Chronic bronchitis (CMS/HCC)    • Chronic kidney disease    • COPD (chronic obstructive pulmonary disease) (CMS/HCC)    • Degeneration of cervical intervertebral disc    • Depression    • Diverticulosis    • Dyspnea    • Esophageal reflux    • Esophagitis    • Gastritis    • Hematuria    • Hemorrhoids    • Hiatal hernia    • History of arthritis    • History of nuclear stress test     UNSURE OF DATE   • History of peripheral neuropathy    • History of ventricular septal defect    • History  of ventricular septal defect    • Hyperlipidemia    • Hypertension    • Infectious diarrhea    • Kyphosis, acquired    • Lumbar radiculopathy    • Mitral and aortic valve disease    • Nephrolithiasis    • Phimosis    • Problems with swallowing     WITH FOOD   • Respiratory failure (CMS/HCC)    • Sleep apnea     BIPAP   • TIA (transient ischemic attack)     X3.   2014   • Ventral hernia    • Wears glasses     FOR READING       Past Surgical History:   Procedure Laterality Date   • CARDIAC CATHETERIZATION     • CARDIAC CATHETERIZATION N/A 5/24/2018    Procedure: Left Heart Cath;  Surgeon: Edouard Robertson MD;  Location: [facility] CATH INVASIVE LOCATION;  Service: Cardiovascular   • HERNIA REPAIR     • ORTHOPEDIC SURGERY Left     Left hip arthroplasty   • SUPRAPUBIC CATHETER INSERTION      History of Percutaneous Catheter Placement Into Ureter   • THROAT SURGERY      FOR SLEEP APNEA   • VSD REPAIR      History of VSD Repair By Patch Closure       Current Facility-Administered Medications on File Prior to Encounter   Medication Dose Route Frequency Provider Last Rate Last Dose   • [DISCONTINUED] acetaminophen (TYLENOL) tablet 650 mg  650 mg Oral Q4H PRN Markel Owens MD, FASN       • [DISCONTINUED] albuterol (PROVENTIL) nebulizer solution 0.083% 2.5 mg/3mL  2.5 mg Nebulization Q4H PRN Markel Owens MD, FASN       • [DISCONTINUED] ALPRAZolam (XANAX) tablet 0.5 mg  0.5 mg Oral Nightly PRN Markel Owens MD, FASN   0.5 mg at 01/22/19 0009   • [DISCONTINUED] aspirin EC tablet 81 mg  81 mg Oral Daily Markel Owens MD, FASN   81 mg at 01/22/19 0909   • [DISCONTINUED] atorvastatin (LIPITOR) tablet 40 mg  40 mg Oral Daily Markel Owens MD, FASN   40 mg at 01/22/19 0909   • [DISCONTINUED] budesonide-formoterol (SYMBICORT) 80-4.5 MCG/ACT inhaler 2 puff  2 puff Inhalation BID - RT Markel Owens MD, FASN   2 puff at 01/22/19 0736   • [DISCONTINUED] calcium-vitamin D 500-200 MG-UNIT tablet 500 mg  500 mg Oral BID Roman  MD Jean Baptiste FASN   500 mg at 01/22/19 0909   • [DISCONTINUED] docusate sodium (COLACE) capsule 100 mg  100 mg Oral BID Markel Owens MD, FASN   100 mg at 01/22/19 0909   • [DISCONTINUED] escitalopram (LEXAPRO) tablet 10 mg  10 mg Oral Daily Markel Owens MD, FASN   10 mg at 01/22/19 0909   • [DISCONTINUED] HYDROcodone-acetaminophen (NORCO) 5-325 MG per tablet 1 tablet  1 tablet Oral Q4H PRN Markel Owens MD, FASN   1 tablet at 01/22/19 0918   • [DISCONTINUED] ipratropium-albuterol (DUO-NEB) nebulizer solution 3 mL  3 mL Nebulization Q6H - RT Markel Owens MD, FASN   3 mL at 01/22/19 0717   • [DISCONTINUED] metoprolol tartrate (LOPRESSOR) tablet 25 mg  25 mg Oral Q12H Markel Owens MD, FASN   25 mg at 01/22/19 0909   • [DISCONTINUED] Morphine sulfate (PF) injection 2 mg  2 mg Intravenous Q4H PRN Markel Owens MD, FASN   2 mg at 01/21/19 0229   • [DISCONTINUED] naloxone (NARCAN) injection 0.4 mg  0.4 mg Intravenous Q5 Min PRN Markel Owens MD, FASN       • [DISCONTINUED] ondansetron (ZOFRAN) injection 4 mg  4 mg Intravenous Q6H PRN Markel Owens MD, FASN   4 mg at 01/21/19 1534   • [DISCONTINUED] ondansetron (ZOFRAN) tablet 4 mg  4 mg Oral Q6H PRN Markel Owens MD, FASN       • [DISCONTINUED] ondansetron ODT (ZOFRAN-ODT) disintegrating tablet 4 mg  4 mg Oral Q6H PRN Markel Owens MD, FASN       • [DISCONTINUED] sodium chloride 0.9 % flush 1-10 mL  1-10 mL Intravenous PRN Markel Owens MD, FASN       • [DISCONTINUED] sodium chloride 0.9 % flush 10 mL  10 mL Intravenous PRN Clifton Aguilera, DO       • [DISCONTINUED] sodium chloride 0.9 % flush 3 mL  3 mL Intravenous Q12H Markel Owens MD, ERIKA   3 mL at 01/22/19 0909   • [DISCONTINUED] tamsulosin (FLOMAX) 24 hr capsule 0.4 mg  0.4 mg Oral Nightly Markel Owens MD, ERIKA   0.4 mg at 01/21/19 0296   • [DISCONTINUED] tobramycin 0.3 % ophthalmic solution 2 drop  2 drop Both Eyes 4x Daily Joshua Mendez,    2 drop at 01/22/19 0900     Current  Outpatient Medications on File Prior to Encounter   Medication Sig Dispense Refill   • albuterol (PROVENTIL) (2.5 MG/3ML) 0.083% nebulizer solution Take 2.5 mg by nebulization Every 4 (Four) Hours As Needed for Wheezing.     • alfuzosin (UROXATRAL) 10 MG 24 hr tablet TAKE ONE TABLET BY MOUTH DAILY 30 tablet 2   • ALPRAZolam (XANAX) 0.5 MG tablet TAKE ONE TABLET BY MOUTH ONCE NIGHTLY AS NEEDED FOR ANXIETY 30 tablet 2   • aspirin 81 MG EC tablet Take 1 tablet by mouth Daily. 90 tablet 3   • atorvastatin (LIPITOR) 40 MG tablet TAKE ONE TABLET BY MOUTH EVERY NIGHT AT BEDTIME 30 tablet 4   • baclofen (LIORESAL) 10 MG tablet TAKE ONE TABLET BY MOUTH THREE TIMES A DAY (Patient taking differently: Pt. taking 1 tablet in the morning and 2 tablets in the evening) 90 tablet 0   • baclofen (LIORESAL) 10 MG tablet TAKE ONE TABLET BY MOUTH THREE TIMES A DAY (Patient not taking: Reported on 1/21/2019) 90 tablet 0   • baclofen (LIORESAL) 10 MG tablet TAKE ONE TABLET BY MOUTH THREE TIMES A DAY (Patient not taking: Reported on 1/21/2019) 90 tablet 0   • BREO ELLIPTA 200-25 MCG/INH aerosol powder  Inhale 1 each Daily. 60 each 5   • budesonide (PULMICORT) 0.5 MG/2ML nebulizer solution Take 2 mL by nebulization 2 (Two) Times a Day.     • calcium carbonate (OS-HAFSA) 600 MG tablet Take 600 mg by mouth 2 (Two) Times a Day With Meals.     • clopidogrel (PLAVIX) 75 MG tablet TAKE ONE TABLET BY MOUTH DAILY 30 tablet 4   • escitalopram (LEXAPRO) 10 MG tablet Take 1 tablet by mouth Daily. 90 tablet 3   • finasteride (PROSCAR) 5 MG tablet Take 5 mg by mouth Daily.     • fluticasone (FLONASE) 50 MCG/ACT nasal spray 1 spray into each nostril Daily. (Patient taking differently: 1 spray into the nostril(s) as directed by provider Daily As Needed.) 16 g 5   • furosemide (LASIX) 20 MG tablet TAKE ONE TABLET BY MOUTH DAILY 30 tablet 0   • furosemide (LASIX) 20 MG tablet TAKE ONE TABLET BY MOUTH DAILY (Patient not taking: Reported on 1/21/2019) 90 tablet  0   • furosemide (LASIX) 20 MG tablet TAKE ONE TABLET BY MOUTH DAILY (Patient not taking: Reported on 1/21/2019) 30 tablet 0   • furosemide (LASIX) 20 MG tablet TAKE ONE TABLET BY MOUTH DAILY (Patient not taking: Reported on 1/21/2019) 30 tablet 0   • ipratropium-albuterol (DUO-NEB) 0.5-2.5 mg/mL nebulizer Take 3 mL by nebulization Every 6 (Six) Hours. 360 mL    • iron polysaccharides (NIFEREX) 150 MG capsule Take 150 mg by mouth 2 (Two) Times a Day.     • metoprolol tartrate (LOPRESSOR) 25 MG tablet Take 1 tablet by mouth Every 12 (Twelve) Hours. 180 tablet 3   • mirtazapine (REMERON) 15 MG tablet Take 15 mg by mouth Every Night.     • Multiple Vitamins-Minerals (MULTIVITAMIN WITH MINERALS) tablet tablet Take 1 tablet by mouth Daily.     • nitroglycerin (NITROSTAT) 0.4 MG SL tablet Place 0.4 mg under the tongue Every 5 (Five) Minutes As Needed.     • O2 (OXYGEN) Inhale 4 L/min 1 (One) Time. Walking on 4L and sitting 3L     • oxybutynin (DITROPAN) 5 MG tablet TAKE ONE TABLET BY MOUTH EVERY NIGHT AT BEDTIME 30 tablet 3   • predniSONE (DELTASONE) 10 MG tablet TAKE ONE TABLET BY MOUTH DAILY 30 tablet 0   • predniSONE (DELTASONE) 20 MG tablet 3 tabs daily for 5 days (Patient not taking: Reported on 1/21/2019) 15 tablet 0   • PROAIR  (90 BASE) MCG/ACT inhaler Inhale 2 puffs Every 4 (Four) Hours As Needed for Wheezing or Shortness of Air.     • spironolactone (ALDACTONE) 25 MG tablet TAKE ONE TABLET BY MOUTH DAILY (Patient taking differently: Pt. taking 1/2 tablet (12.5 mg) once daily) 30 tablet 3   • traMADol (ULTRAM) 50 MG tablet TAKE ONE TABLET BY MOUTH EVERY 8 HOURS AS NEEDED FOR MODERATE PAIN (Patient not taking: Reported on 1/21/2019) 60 tablet 0       Social History     Socioeconomic History   • Marital status:      Spouse name: Not on file   • Number of children: Not on file   • Years of education: Not on file   • Highest education level: Not on file   Social Needs   • Financial resource strain: Not  "on file   • Food insecurity - worry: Not on file   • Food insecurity - inability: Not on file   • Transportation needs - medical: Not on file   • Transportation needs - non-medical: Not on file   Occupational History     Employer: RETIRED   Tobacco Use   • Smoking status: Former Smoker     Packs/day: 3.00     Years: 30.00     Pack years: 90.00     Types: Cigarettes     Last attempt to quit: 2017     Years since quittin.0   • Smokeless tobacco: Never Used   Substance and Sexual Activity   • Alcohol use: No   • Drug use: No   • Sexual activity: Defer   Other Topics Concern   • Not on file   Social History Narrative   • Not on file       Family History   Problem Relation Age of Onset   • Other Father         CABG   • Coronary artery disease Father        REVIEW OF SYSTEMS:   14 point ROS was performed and is Negative except as outlined in HPI     Objective:     Vitals:    19 1300 19 1302 19 1310   BP: 105/71 116/56    BP Location: Right arm Left arm    Patient Position: Lying Lying    Pulse:   66   Resp: 18     Temp: 97.8 °F (36.6 °C)     TempSrc: Oral     SpO2:   98%   Weight: 75.4 kg (166 lb 3.2 oz)     Height: 172.7 cm (68\")       Body mass index is 25.27 kg/m².  Flowsheet Rows      First Filed Value   Admission Height  172.7 cm (68\") Documented at 2019 1300   Admission Weight  75.4 kg (166 lb 3.2 oz) Documented at 2019 1300        No intake or output data in the 24 hours ending 19 1401    Physical Exam   Constitutional: well-developed and well-nourished. No distress.   HENT: Normocephalic. Atraumatic.   Eyes: Conjunctivae are normal. No scleral icterus.   Neck: Supple. No JVD. No carotid bruits.   Cardiovascular: RRR. S1 and S2 normal. No rubs, murmurs or gallops.  Pulses:       Radial pulses are 2+ on the right side, and 2+ on the left side.       Dorsalis pedis pulses are 2+ on the right side, and 2+ on the left side.   Pulmonary/Chest: Nonlabored. Faint wheezing and " decreased BS at bases. 4L O2 NC.  Musculoskeletal:  exhibits no tenderness or deformity.   EXT: No swelling  Neurological: is alert and oriented to person, place, and time.   Skin: Skin is warm and dry. No rash noted. Nondiaphoretic. No cyanosis or erythema. No pallor. Nails show no clubbing.   Psychiatric: Normal mood and affect. Speech is normal and behavior is normal.      Lab Review:                Results from last 7 days   Lab Units 01/21/19  0532   SODIUM mmol/L 139   POTASSIUM mmol/L 3.9   CHLORIDE mmol/L 98   CO2 mmol/L 38.0*   BUN mg/dL 44*   CREATININE mg/dL 1.40*   GLUCOSE mg/dL 100*   CALCIUM mg/dL 9.1     Results from last 7 days   Lab Units 01/21/19  0532 01/20/19  2246 01/20/19 1959   TROPONIN I ng/mL 0.057* 0.052* 0.043*     Results from last 7 days   Lab Units 01/21/19  0532   WBC 10*3/mm3 8.93   HEMOGLOBIN g/dL 10.5*   HEMATOCRIT % 33.4*   PLATELETS 10*3/mm3 170         Results from last 7 days   Lab Units 01/21/19  0532   CHOLESTEROL mg/dL 168     Results from last 7 days   Lab Units 01/21/19  0532   MAGNESIUM mg/dL 2.1     Results from last 7 days   Lab Units 01/21/19  0532   CHOLESTEROL mg/dL 168   TRIGLYCERIDES mg/dL 152*   HDL CHOL mg/dL 49   LDL CHOL mg/dL 89         I personally viewed and interpreted the patient's EKG/Telemetry data    Tele: NSR  EKG: NSR with frequent PACs, PVCs, and reported bifasicular block, ST changes previously noted in anterior leads without sig change.   Assessment/Plan:   1)  Syncope/ near Syncope  - [facility] 5/2018: mild diffuse atherosclerosis with no significant flow-limiting obstructive coronary artery disease.  - Troponin 0.034-->0.057; EKG NSR with frequent PACs, PVCs, and reported bifasicular block, ST changes previously noted in anterior leads without sig change.   - E-lytes WNL  - Run of NSVT on tele on 1/21 up to 4-5 beats and after discussion with the patient he did not feel any different at that time just had a very bad night and felt short of breath and  just not himself.  Also, last night his oxygenation did drop into the 70s since his oxygen was not on while wearing CPAP.  - On BB  - Etiology of syncope is unclear. Sounds vasovagal but cannot rule out ventricular arrhythmias vs. Bradycardia induced at this time. Ef 45%    2) Reduced LV systolic function  - Echo 1/21: EF 45%    3) CAD, non obstructive   - LHC as above  - On DAPT, statin, BB    4)  HTN  - controlled  - continue current meds    5) HLD  - Continue statin    6) CKD  - Creatinine 1.4  - monitor closely with daily BMP    7) COPD  - continue inhalers and O2  - Uses intermittent CPAP as well  - 4 L O2 at home.     8) PEDRO, on CPAP HS    Plan:  - Will monitor on telemetry.   - Obtain copy of tele strips with NSVT to confirm if this is VT vs. aberrancy with baseline RBBB  - Consult IM for his multiple other medical issues including COPD and times of worsening SOB and wheezing, now on 4L since about one year but at times still dealing with COPD issues  - Will defer further testing to Dr Bass to rule out ischemia if needed  - In regards to the syncope, he did have a bad night yesterday however does not seem that the nonsustained ventricular tachycardia was symptomatic he just did not feel himself with shortness of breath and not having a good night throughout the whole night not just one particular occasion. We'll make sure no other cause of the patient's presyncope/syncope.  I am concerned that some of the syncope could be due tohypoxemia driven as well as vagal in nature.  Cannot truly rule out bradycardia or tachyarrhythmias however after discussion with the patient I do not think the nonsustained ventricular tachycardia was truly symptomatic and what was the cause of his symptoms before admission since very short-lived and he cannot say that this is what he felt like prior especially because yesterday was just overall bad night. If no other cause found then consideration of EP study vs proceeding just  with a 30 day event monitor first.  We will see how he does during this hospitalization and determine the best course of action at that time.    Thank you for allowing me to participate in the care of Jani Pena. Feel free to contact me directly with any further questions or concerns.      Electronically signed by PAL Godwin, 19, 2:01 PM.    I, Omar Encinas, personally performed the services described in this documentation as scribed by the above named individual in my presence, and it is both accurate and complete.  2019  3:29 PM    Omar Encinas DO  3:29 PM  19              Electronically signed by Omar Encinas DO at 2019  3:29 PM          Physical Therapy Notes (most recent note)      Nicole Daniel PTA at 2019  9:49 AM  Version 1 of 1         Acute Care - Physical Therapy Treatment Note  [facility]     Patient Name: aJni Pena  : 1950  MRN: [id number]  Today's Date: 2019  Onset of Illness/Injury or Date of Surgery: 19  Date of Referral to PT: 19  Referring Physician: PAL Trejo    Admit Date: 2019    Visit Dx:    ICD-10-CM ICD-9-CM   1. Impaired functional mobility, balance, gait, and endurance Z74.09 V49.89   2. Oral phase dysphagia R13.11 787.21   3. Impaired mobility and ADLs Z74.09 799.89   4. Persistent atrial fibrillation (CMS/HCC) I48.1 427.31   5. Chronic systolic congestive heart failure (CMS/HCC) I50.22 428.22     428.0   6. Ventricular tachycardia (CMS/HCC) I47.2 427.1   7. Type 2 diabetes mellitus with complication, without long-term current use of insulin (CMS/HCC) E11.8 250.90   8. Physical deconditioning R53.81 799.3   9. Nausea R11.0 787.02   10. Esophageal dysphagia R13.10 787.20     Patient Active Problem List   Diagnosis   • Anxiety   • Atherosclerotic heart disease of native coronary artery without angina pectoris   • Atrial fibrillation (CMS/HCC)   • Chronic kidney disease, stage III  (moderate) (CMS/HCC)   • Steroid-dependent COPD (CMS/HCC)   • Degeneration of cervical intervertebral disc   • Diabetes mellitus (CMS/HCC)   • GERD without esophagitis   • Diverticulosis   • Hemorrhoids   • Hiatal hernia   • Hyperlipidemia   • Hypertension   • Kyphosis, acquired   • Mitral and aortic valve disease   • Sleep apnea   • Enlarged prostate without lower urinary tract symptoms (luts)   • History of arthritis   • Back pain   • Depression   • Stroke syndrome   • History of ventricular septal defect   • Neck pain   • Impaired mobility and ADLs   • Physical deconditioning   • Chronic systolic congestive heart failure (CMS/HCC)   • Atherosclerosis of native coronary artery of native heart with unstable angina pectoris (CMS/HCC)   • Lumbar radiculopathy   • Dysphagia   • Heartburn   • Anemia   • Syncope and collapse   • Ventricular tachycardia (CMS/HCC)   • N&V (nausea and vomiting)   • Scalp laceration   • Nausea   • Esophageal dysphagia       Therapy Treatment    Rehabilitation Treatment Summary     Row Name 02/01/19 0822             Treatment Time/Intention    Discipline  physical therapy assistant  -AS      Document Type  therapy note (daily note)  -AS      Subjective Information  complains of;weakness;fatigue;pain  -AS      Mode of Treatment  physical therapy  -AS      Patient/Family Observations  no family at bedside, patient supine with sling for pacemaker  -AS      Patient Effort  good  -AS      Comment  repostioned sling prior to OOB, nsg removed once back from walk.  -AS      Existing Precautions/Restrictions  fall;oxygen therapy device and L/min;pacemaker  -AS      Recorded by [AS] Nicole Daniel, HELADIO 02/01/19 0947      Row Name 02/01/19 0822             Vital Signs    Pre SpO2 (%)  95  -AS      O2 Delivery Pre Treatment  supplemental O2  -AS      Intra SpO2 (%)  88  -AS      O2 Delivery Intra Treatment  supplemental O2  -AS      Post SpO2 (%)  94  -AS      O2 Delivery Post Treatment   supplemental O2  -AS      Pre Patient Position  Supine  -AS      Intra Patient Position  Standing  -AS      Post Patient Position  Sitting  -AS      Recorded by [AS] Nicole Daniel, PTA 02/01/19 0947      Row Name 02/01/19 0822             Cognitive Assessment/Intervention- PT/OT    Affect/Mental Status (Cognitive)  WFL  -AS      Orientation Status (Cognition)  oriented x 4  -AS      Follows Commands (Cognition)  WFL  -AS      Safety Deficit (Cognitive)  mild deficit  -AS      Personal Safety Interventions  fall prevention program maintained;gait belt;nonskid shoes/slippers when out of bed  -AS      Recorded by [AS] Nicole Daniel, PTA 02/01/19 0947      Row Name 02/01/19 0822             Bed Mobility Assessment/Treatment    Supine-Sit [place] (Bed Mobility)  verbal cues;supervision  -AS      Comment (Bed Mobility)  cues for pacemaker protection with L UE, in sling.  -AS      Recorded by [AS] Nicole Daniel, PTA 02/01/19 0947      Row Name 02/01/19 0822             Sit-Stand Transfer    Sit-Stand [place] (Transfers)  verbal cues;contact guard  -AS      Assistive Device (Sit-Stand Transfers)  other (see comments) no AD  -AS      Recorded by [AS] Nicole Daniel, PTA 02/01/19 0947      Row Name 02/01/19 0822             Stand-Sit Transfer    Stand-Sit [place] (Transfers)  verbal cues;contact guard  -AS      Assistive Device (Stand-Sit Transfers)  other (see comments) no AD, L UE in sling for comfort per pts request  -AS      Recorded by [AS] Nicole Daniel, PTA 02/01/19 0947      Row Name 02/01/19 0822             Gait/Stairs Assessment/Training    69391 - Gait Training Minutes   15  -AS      Gait/Stairs Assessment/Training  gait/ambulation assistive device  -AS      [place] Level (Gait)  verbal cues;contact guard  -AS      Assistive Device (Gait)  other (see comments) none  -AS      Distance in Feet (Gait)  220  -AS      Pattern (Gait)  step-through  -AS       Deviations/Abnormal Patterns (Gait)  bilateral deviations;base of support, narrow;pati decreased;gait speed decreased  -AS      Comment (Gait/Stairs)  patient ambulated on 3 liters O2 per NC, some SOA with activity but was able to increase gait distance.  -AS      Recorded by [AS] Nicole Daniel, [facility] 02/01/19 0947      Row Name 02/01/19 0822             Motor Skills Assessment/Interventions    Additional Documentation  Therapeutic Exercise (Group)  -AS      Recorded by [AS] Nicole Daniel, [facility] 02/01/19 0947      Row Name 02/01/19 0822             Therapeutic Exercise    66809 - PT Therapeutic Exercise Minutes  8  -AS      Recorded by [AS] Nicole Daniel, [facility] 02/01/19 0947      Row Name 02/01/19 0822             Therapeutic Exercise    Lower Extremity Range of Motion (Therapeutic Exercise)  hip flexion/extension, bilateral;knee flexion/extension, bilateral;ankle dorsiflexion/plantar flexion, bilateral  -AS      Exercise Type (Therapeutic Exercise)  AROM (active range of motion)  -AS      Position (Therapeutic Exercise)  seated  -AS      Sets/Reps (Therapeutic Exercise)  1/10  -AS      Recorded by [AS] Nicole Daniel, [facility] 02/01/19 09      Row Name 02/01/19 0822             Positioning and Restraints    Pre-Treatment Position  in bed  -AS      Post Treatment Position  chair  -AS      In Chair  reclined;call light within reach;encouraged to call for assist;waffle cushion;with nsg  -AS      Recorded by [AS] Nicole Daniel, [facility] 02/01/19 0947      Row Name 02/01/19 0822             Pain Scale: Numbers Pre/Post-Treatment    Pain Scale: Numbers, Pretreatment  3/10  -AS      Pain Scale: Numbers, Post-Treatment  3/10  -AS      Pain Location - Side  Left  -AS      Pain Location  -- pacemaker site  -AS      Pain Intervention(s)  Repositioned;Ambulation/increased activity  -AS      Recorded by [AS] Nicole Daniel, [facility] 02/01/19 0947      Row Name                Wound 01/21/19 2000 Left posterior  head laceration    Wound - Properties Group Date first assessed: 01/21/19 [PAUL] Time first assessed: 2000 [PAUL] Present On Admission : yes [PAUL] Side: Left [PAUL] Orientation: posterior [PAUL] Location: head [PAUL] Type: laceration [PAUL] Number of Staples Placed: 6 [KC2] Recorded by:  [PAUL] Cyn Jaquez RN 01/22/19 0432 [KC2] Cyn Jaquez RN 01/22/19 0435    Row Name                Wound 01/22/19 2008 Left elbow abrasion    Wound - Properties Group Date first assessed: 01/22/19 [CC] Time first assessed: 2008 [CC] Present On Admission : yes [CC] Side: Left [CC] Location: elbow [CC] Type: abrasion [CC] Recorded by:  [CC] Erica Lam RN 01/22/19 2332    Row Name                Wound 01/26/19 2000 coccyx other (see comments)    Wound - Properties Group Date first assessed: 01/26/19 [LL] Time first assessed: 2000 [LL] Present On Admission : no [LL] Location: coccyx [LL] Type: other (see comments) [LL], RED PRESSURE AREA  Additional Comments: BILATERAL MEDIAL, UPPER ISCHIAL AND SACRO-COCCYGEAL [LL] Recorded by:  [LL] Tran Page RN 01/27/19 0048    Row Name                Wound 01/31/19 0939 Left upper chest incision;surgical    Wound - Properties Group Date first assessed: 01/31/19 [LLA] Time first assessed: 0939 [LLA] Present On Admission : no [LLA] Side: Left [LLA] Orientation: upper [LLA] Location: chest [LLA] Type: incision;surgical [LLA] Recorded by:  [LLA] Janice Montelongo RN 01/31/19 0940      User Key  (r) = Recorded By, (t) = Taken By, (c) = Cosigned By    Initials Name Effective Dates Discipline    AS Nicole Daniel, HELADIO 06/22/15 -  PT    Janice Nicholas RN 06/16/16 -  Nurse    Erica Oneil RN 06/16/16 -  Nurse    Tran Peck RN 06/16/16 -  Nurse    Cyn Leo RN 02/22/17 -  Nurse          Wound 01/21/19 2000 Left posterior head laceration (Active)   Closure Open to air 1/31/2019  8:25 PM   Periwound edematous;ecchymotic;dry 1/31/2019  8:25 PM   Periwound Temperature warm  1/31/2019  8:25 PM   Periwound Skin Turgor soft 1/31/2019  8:25 PM   Drainage Amount none 1/31/2019  8:25 PM       Wound 01/22/19 2008 Left elbow abrasion (Active)   Closure Open to air 1/31/2019  8:25 PM   Base scab;dry 1/31/2019  8:25 PM   Periwound dry;redness;blanchable 1/31/2019  8:25 PM   Periwound Temperature warm 1/31/2019  8:25 PM   Periwound Skin Turgor soft 1/31/2019  8:25 PM   Edges irregular 1/31/2019  8:25 PM   Drainage Amount none 1/31/2019  8:25 PM       Wound 01/26/19 2000 coccyx other (see comments) (Active)   Dressing Appearance dressing loose 1/31/2019  8:25 PM   Base dry;clean;pink 1/31/2019  8:25 PM   Periwound dry;blanchable;redness 1/31/2019  8:25 PM   Periwound Temperature warm 1/31/2019  8:25 PM   Periwound Skin Turgor soft 1/31/2019  8:25 PM   Drainage Amount none 1/31/2019  8:25 PM   Dressing Care, Wound dressing removed 1/31/2019  8:25 PM       Wound 01/31/19 0939 Left upper chest incision;surgical (Active)   Dressing Appearance dry;intact 1/31/2019 12:00 PM   Closure SELENE 1/31/2019 12:00 PM   Periwound intact;warm;dry 1/31/2019 11:15 AM   Periwound Temperature warm 1/31/2019 11:15 AM   Drainage Amount none 1/31/2019 11:15 AM           Physical Therapy Education     Title: PT OT SLP Therapies (Not Started)     Topic: Physical Therapy (In Progress)     Point: Mobility training (In Progress)     Learning Progress Summary           Patient Acceptance, E, NR by AS at 2/1/2019  9:47 AM    Eager, E,D, NR by LUKASZ at 1/30/2019  1:26 PM    Acceptance, E, NR,VU by JR at 1/28/2019 10:47 AM    Comment:  Pt educated on POC, importance of OOB activity and safe functional mobility.    Acceptance, E,D, NR by KRISTAL at 1/27/2019 10:24 AM    Eager, E,D, NR by LUKASZ at 1/23/2019 11:28 AM   Family Eager, E,D, NR by DM at 1/23/2019 11:28 AM                   Point: Home exercise program (In Progress)     Learning Progress Summary           Patient Acceptance, CAROL ANN, NR by AS at 2/1/2019  9:47 AM    CAROL ANN Pollard D, NR by DM at  1/30/2019  1:26 PM    Acceptance, E, NR,VU by  at 1/28/2019 10:47 AM    Comment:  Pt educated on POC, importance of OOB activity and safe functional mobility.    Acceptance, E,D, NR by KRISTAL at 1/27/2019 10:24 AM    Eager, E,D, NR by DM at 1/23/2019 11:28 AM   Family Eager, E,D, NR by DM at 1/23/2019 11:28 AM                   Point: Body mechanics (In Progress)     Learning Progress Summary           Patient Acceptance, E, NR by AS at 2/1/2019  9:47 AM    Acceptance, E, VU by MISHEL at 2/1/2019  1:24 AM    Eager, E,D, NR by DM at 1/30/2019  1:26 PM    Acceptance, E, NR,VU by  at 1/28/2019 10:47 AM    Comment:  Pt educated on POC, importance of OOB activity and safe functional mobility.    Acceptance, E,D, NR by KRISTAL at 1/27/2019 10:24 AM    Eager, E,D, NR by DM at 1/23/2019 11:28 AM   Family Eager, E,D, NR by DM at 1/23/2019 11:28 AM                   Point: Precautions (In Progress)     Learning Progress Summary           Patient Acceptance, E, NR by AS at 2/1/2019  9:47 AM    Eager, E,D, NR by DM at 1/30/2019  1:26 PM    Acceptance, E, NR,VU by  at 1/28/2019 10:47 AM    Comment:  Pt educated on POC, importance of OOB activity and safe functional mobility.    Acceptance, E,D, NR by KRISTAL at 1/27/2019 10:24 AM    Eager, E,D, NR by LUKASZ at 1/23/2019 11:28 AM   Family Eager, E,D, NR by LUKASZ at 1/23/2019 11:28 AM                               User Key     Initials Effective Dates Name Provider Type Discipline     06/19/15 -  Anna Ibanez, PT Physical Therapist PT    LUKASZ 06/19/15 -  Carmita Venegas, PT Physical Therapist PT    AS 06/22/15 -  Nicole Daniel, PTA Physical Therapy Assistant PT     06/16/16 -  Edouard Gómez, RN Registered Nurse Nurse     12/06/18 -  Mable Mcclain, PT Student PT Student PT                PT Recommendation and Plan     Plan of Care Reviewed With: patient  Progress: improving  Outcome Summary: patient ambulated 220 feet with CGA and 3 liters per NC. Gait distance increased with nno  increase in pain or SOA.  Outcome Measures     Row Name 02/01/19 0822 01/30/19 1137 01/29/19 1337       How much help from another person do you currently need...    Turning from your back to your side while in flat bed without using bedrails?  4  -AS  4  -DM  --    Moving from lying on back to sitting on the side of a flat bed without bedrails?  4  -AS  4  -DM  --    Moving to and from a bed to a chair (including a wheelchair)?  3  -AS  3  -DM  --    Standing up from a chair using your arms (e.g., wheelchair, bedside chair)?  3  -AS  4  -DM  --    Climbing 3-5 steps with a railing?  3  -AS  3  -DM  --    To walk in hospital room?  3  -AS  3  -DM  --    AM-PAC 6 Clicks Score  20  -AS  21  -DM  --       How much help from another is currently needed...    Putting on and taking off regular lower body clothing?  --  --  3  -TA    Bathing (including washing, rinsing, and drying)  --  --  3  -TA    Toileting (which includes using toilet bed pan or urinal)  --  --  4  -TA    Putting on and taking off regular upper body clothing  --  --  4  -TA    Taking care of personal grooming (such as brushing teeth)  --  --  4  -TA    Eating meals  --  --  4  -TA    Score  --  --  22  -TA       Functional Assessment    Outcome Measure Options  AM-PAC 6 Clicks Basic Mobility (PT)  -AS  AM-PAC 6 Clicks Basic Mobility (PT)  -DM  AM-PAC 6 Clicks Daily Activity (OT)  -TA      User Key  (r) = Recorded By, (t) = Taken By, (c) = Cosigned By    Initials Name Provider Type    DM Carmita Venegas, PT Physical Therapist    AS Nicole Daniel, PTA Physical Therapy Assistant    TA Kamron Inman, OT Occupational Therapist         Time Calculation:   PT Charges     Row Name 02/01/19 0822             Time Calculation    Start Time  0822  -AS      PT Received On  02/01/19  -AS      PT Goal Re-Cert Due Date  02/02/19  -AS         Timed Charges    33892 - PT Therapeutic Exercise Minutes  8  -AS      70682 - Gait Training Minutes   15  -AS         User Key  (r) = Recorded By, (t) = Taken By, (c) = Cosigned By    Initials Name Provider Type    AS Nicole Daniel PTA Physical Therapy Assistant        Therapy Suggested Charges     Code   Minutes Charges    29327 (CPT®) Hc Pt Neuromusc Re Education Ea 15 Min      63543 (CPT®) Hc Pt Ther Proc Ea 15 Min 8 1    23965 (CPT®) Hc Gait Training Ea 15 Min 15 1    20536 (CPT®) Hc Pt Therapeutic Act Ea 15 Min      70928 (CPT®) Hc Pt Manual Therapy Ea 15 Min      38328 (CPT®) Hc Pt Iontophoresis Ea 15 Min      26953 (CPT®) Hc Pt Elec Stim Ea-Per 15 Min      87474 (CPT®) Hc Pt Ultrasound Ea 15 Min      90826 (CPT®) Hc Pt Self Care/Mgmt/Train Ea 15 Min      57143 (CPT®) Hc Pt Prosthetic (S) Train Initial Encounter, Each 15 Min      68603 (CPT®) Hc Pt Orthotic(S)/Prosthetic(S) Encounter, Each 15 Min      38377 (CPT®) Hc Orthotic(S) Mgmt/Train Initial Encounter, Each 15min      Total  23 2        Therapy Charges for Today     Code Description Service Date Service Provider Modifiers Qty    35051135488 HC PT THER PROC EA 15 MIN 2019 Nicole Daniel PTA GP 1    44362544587 HC GAIT TRAINING EA 15 MIN 2019 Nicole Daniel PTA GP 1          PT G-Codes  Outcome Measure Options: AM-PAC 6 Clicks Basic Mobility (PT)  AM-PAC 6 Clicks Score: 20  Score: 22    Nicole Daniel PTA  2019         Electronically signed by Nicole Daniel PTA at 2019  9:49 AM          Occupational Therapy Notes (most recent note)      Kamron Inman, OT at 2019  2:02 PM          Acute Care - Occupational Therapy Treatment Note  [facility]     Patient Name: Jani Pena  : 1950  MRN: [id number]  Today's Date: 2019  Onset of Illness/Injury or Date of Surgery: 19  Date of Referral to OT: 19  Referring Physician: PLA Trejo    Admit Date: 2019       ICD-10-CM ICD-9-CM   1. Impaired functional mobility, balance, gait, and endurance Z74.09 V49.89   2. Oral phase dysphagia R13.11  787.21   3. Impaired mobility and ADLs Z74.09 799.89   4. Persistent atrial fibrillation (CMS/[facility]) I48.1 427.31   5. Chronic systolic congestive heart failure (CMS/[facility]) I50.22 428.22     428.0   6. Ventricular tachycardia (CMS/[facility]) I47.2 427.1   7. Type 2 diabetes mellitus with complication, without long-term current use of insulin (CMS/[facility]) E11.8 250.90   8. Physical deconditioning R53.81 799.3   9. Nausea R11.0 787.02   10. Esophageal dysphagia R13.10 787.20     Patient Active Problem List   Diagnosis   • Anxiety   • Atherosclerotic heart disease of native coronary artery without angina pectoris   • Atrial fibrillation (CMS/[facility])   • Chronic kidney disease, stage III (moderate) (CMS/[facility])   • Steroid-dependent COPD (CMS/[facility])   • Degeneration of cervical intervertebral disc   • Diabetes mellitus (CMS/[facility])   • GERD without esophagitis   • Diverticulosis   • Hemorrhoids   • Hiatal hernia   • Hyperlipidemia   • Hypertension   • Kyphosis, acquired   • Mitral and aortic valve disease   • Sleep apnea   • Enlarged prostate without lower urinary tract symptoms (luts)   • History of arthritis   • Back pain   • Depression   • Stroke syndrome   • History of ventricular septal defect   • Neck pain   • Impaired mobility and ADLs   • Physical deconditioning   • Chronic systolic congestive heart failure (CMS/[facility])   • Atherosclerosis of native coronary artery of native heart with unstable angina pectoris (CMS/[facility])   • Lumbar radiculopathy   • Dysphagia   • Heartburn   • Anemia   • Syncope and collapse   • Ventricular tachycardia (CMS/[facility])   • N&V (nausea and vomiting)   • Scalp laceration   • Nausea   • Esophageal dysphagia     Past Medical History:   Diagnosis Date   • Abnormal liver enzymes    • Anemia    • Anxiety    • Arthritis    • Atherosclerotic heart disease of native coronary artery without angina pectoris    • Atrial fibrillation (CMS/[facility])    • Back pain    • BPH (benign prostatic hyperplasia)    • Cataract, bilateral    •  Chest pain     2-3 MONTHS AGO.  PER PT, HAS ADDRESSED WITH CARDIOLOGIST.  STATES HAS NTG PRN.   • CHF (congestive heart failure) (CMS/HCC)    • Chronic bronchitis (CMS/HCC)    • Chronic kidney disease    • COPD (chronic obstructive pulmonary disease) (CMS/HCC)    • Degeneration of cervical intervertebral disc    • Depression    • Diverticulosis    • Dyspnea    • Esophageal reflux    • Esophagitis    • Gastritis    • Hematuria    • Hemorrhoids    • Hiatal hernia    • History of arthritis    • History of nuclear stress test     UNSURE OF DATE   • History of peripheral neuropathy    • History of ventricular septal defect    • History of ventricular septal defect    • Hyperlipidemia    • Hypertension    • Infectious diarrhea    • Kyphosis, acquired    • Lumbar radiculopathy    • Mitral and aortic valve disease    • Nephrolithiasis    • Phimosis    • Problems with swallowing     WITH FOOD   • Respiratory failure (CMS/HCC)    • Sleep apnea     BIPAP   • TIA (transient ischemic attack)     X3.   2014   • Ventral hernia    • Wears glasses     FOR READING     Past Surgical History:   Procedure Laterality Date   • CARDIAC CATHETERIZATION     • CARDIAC CATHETERIZATION N/A 5/24/2018    Procedure: Left Heart Cath;  Surgeon: Edouard Robertson MD;  Location:  [facility];  Service: Cardiovascular   • ENDOSCOPY N/A 1/26/2019    Procedure: ESOPHAGOGASTRODUODENOSCOPY;  Surgeon: Brunner, Mark I, MD;  Location:  [facility];  Service: Gastroenterology   • HERNIA REPAIR     • ORTHOPEDIC SURGERY Left     Left hip arthroplasty   • SUPRAPUBIC CATHETER INSERTION      History of Percutaneous Catheter Placement Into Ureter   • THROAT SURGERY      FOR SLEEP APNEA   • VSD REPAIR      History of VSD Repair By Patch Closure       Therapy Treatment    Rehabilitation Treatment Summary     Row Name 01/29/19 7212             Treatment Time/Intention    Discipline  occupational therapist  -TA      Document Type  therapy note (daily  note)  -TA      Subjective Information  complains of;weakness  -TA      Mode of Treatment  occupational therapy  -TA      Patient Effort  good  -TA      Existing Precautions/Restrictions  fall;cardiac;oxygen therapy device and L/min  -TA      Patient Response to Treatment  tolerated well  -TA      Recorded by [TA] Kamron Inman, OT 01/29/19 1358      Row Name 01/29/19 1336             Vital Signs    Post SpO2 (%)  96  -TA      O2 Delivery Post Treatment  supplemental O2  -TA      Pre Patient Position  Supine  -TA      Intra Patient Position  Standing  -TA      Post Patient Position  Supine  -TA      Recorded by [TA] Kamron Inman, OT 01/29/19 1358      Row Name 01/29/19 1331             Cognitive Assessment/Intervention- PT/OT    Affect/Mental Status (Cognitive)  WFL  -TA      Orientation Status (Cognition)  oriented x 4  -TA      Follows Commands (Cognition)  WFL  -TA      Cognitive Function (Cognitive)  safety deficit  -TA      Safety Deficit (Cognitive)  mild deficit;awareness of need for assistance  -TA      Cognitive Interventions (Cognitive)  occupation/activity based interventions  -TA      Personal Safety Interventions  fall prevention program maintained;gait belt;nonskid shoes/slippers when out of bed;supervised activity  -TA      Recorded by [TA] Kamron Inman, OT 01/29/19 1358      Row Name 01/29/19 1335             Bed Mobility Assessment/Treatment    Supine-Sit [place] (Bed Mobility)  conditional independence  -TA      Sit-Supine [place] (Bed Mobility)  conditional independence  -TA      Recorded by [TA] Kamron Inman, OT 01/29/19 1358      Row Name 01/29/19 1339             Functional Mobility    Functional Mobility- Ind. Level  standby assist  -TA      Functional Mobility- Device  rolling walker  -TA      Functional Mobility-Distance (Feet)  250  -TA      Functional Mobility- Safety Issues  supplemental O2;step length decreased  -TA      Functional Mobility- Comment   No LOB or unsteadiness with RW  -TA      Recorded by [TA] Kamron Inman, OT 01/29/19 1358      Row Name 01/29/19 1337             Transfer Assessment/Treatment    Transfer Assessment/Treatment  sit-stand transfer;stand-sit transfer  -TA      Comment (Transfers)  VCs for safe technique  -TA      Recorded by [TA] Kamron Inman, OT 01/29/19 1358      Row Name 01/29/19 1337             Sit-Stand Transfer    Sit-Stand [place] (Transfers)  stand by assist;verbal cues  -TA      Assistive Device (Sit-Stand Transfers)  walker, front-wheeled  -TA      Recorded by [TA] Kamron Inman, OT 01/29/19 1358      Row Name 01/29/19 1337             Stand-Sit Transfer    Stand-Sit [place] (Transfers)  stand by assist;verbal cues  -TA      Assistive Device (Stand-Sit Transfers)  walker, front-wheeled  -TA      Recorded by [TA] Kamron Inman, OT 01/29/19 1358      Row Name 01/29/19 1337             ADL Assessment/Intervention    95120 - OT Self Care/Mgmt Minutes  9  -TA      BADL Assessment/Intervention  toileting;grooming  -TA      Recorded by [TA] Kamron Inman, OT 01/29/19 1358      Row Name 01/29/19 1337             Grooming Assessment/Training    [place] Level (Grooming)  wash face, hands;supervision  -TA      Grooming Position  sink side;supported standing  -TA      Comment (Grooming)  VCs for RW placement  -TA      Recorded by [TA] Kamron Inman, OT 01/29/19 1358      Row Name 01/29/19 1337             Toileting Assessment/Training    [place] Level (Toileting)  toileting skills;supervision  -TA      Assistive Devices (Toileting)  grab bar/safety frame  -TA      Toileting Position  unsupported sitting;supported standing  -TA      Recorded by [TA] Kamron Inman, OT 01/29/19 1358      Row Name 01/29/19 1335             BADL Safety/Performance    Impairments, BADL Safety/Performance  balance;endurance/activity tolerance;strength;shortness of breath  -TA      Skilled BADL  Treatment/Intervention  BADL process/adaptation training  -TA      Recorded by [TA] Kamron Inman OT 01/29/19 1358      Row Name 01/29/19 1337             Therapeutic Exercise    55716 - OT Therapeutic Activity Minutes  9  -TA      Recorded by [TA] Kamron Inman OT 01/29/19 1358      Row Name 01/29/19 1337             Dynamic Standing Balance    Level of [place], Reaches Outside Midline (Standing, Dynamic Balance)  standby assist  -TA      Time Able to Maintain Position, Reaches Outside Midline (Standing, Dynamic Balance)  2 to 3 minutes  -TA      Assistive Device Utilized (Supported Standing, Dynamic Balance)  walker, rolling  -TA      Recorded by [TA] Kamrno Inman OT 01/29/19 1358      Row Name 01/29/19 1337             Positioning and Restraints    Pre-Treatment Position  in bed  -TA      Post Treatment Position  bed  -TA      In Bed  notified nsg;supine;call light within reach;encouraged to call for assist;exit alarm on;side rails up x2  -TA      Recorded by [TA] Kamron Inman OT 01/29/19 1358      Row Name 01/29/19 1337             Pain Assessment    Additional Documentation  Pain Scale: Numbers Pre/Post-Treatment (Group)  -TA      Recorded by [TA] Kamron Inman OT 01/29/19 1358      Row Name 01/29/19 1337             Pain Scale: Numbers Pre/Post-Treatment    Pain Scale: Numbers, Pretreatment  3/10  -TA      Pain Scale: Numbers, Post-Treatment  3/10  -TA      Pain Location - Orientation  posterior  -TA      Pain Location  neck  -TA      Pain Intervention(s)  Repositioned;Ambulation/increased activity  -TA      Recorded by [TA] Kamron Inman, OT 01/29/19 1358      Row Name                Wound 01/21/19 2000 Left posterior head laceration    Wound - Properties Group Date first assessed: 01/21/19 [PAUL] Time first assessed: 2000 [PAUL] Present On Admission : yes [PAUL] Side: Left [PAUL] Orientation: posterior [PAUL] Location: head [PAUL] Type: laceration [PAUL] Number of Staples  Placed: 6 [KC2] Recorded by:  [PAUL] Cyn Jaquez RN 01/22/19 0432 [KC2] Cyn Jaquez RN 01/22/19 0435    Row Name                Wound 01/22/19 2008 Left elbow abrasion    Wound - Properties Group Date first assessed: 01/22/19 [CC] Time first assessed: 2008 [CC] Present On Admission : yes [CC] Side: Left [CC] Location: elbow [CC] Type: abrasion [CC] Recorded by:  [CC] Erica Lam RN 01/22/19 2332    Row Name                Wound 01/26/19 2000 coccyx other (see comments)    Wound - Properties Group Date first assessed: 01/26/19 [LL] Time first assessed: 2000 [LL] Present On Admission : no [LL] Location: coccyx [LL] Type: other (see comments) [LL], RED PRESSURE AREA  Additional Comments: BILATERAL MEDIAL, UPPER ISCHIAL AND SACRO-COCCYGEAL [LL] Recorded by:  [LL] Tran Page RN 01/27/19 0048    Row Name 01/29/19 1337             Coping    Observed Emotional State  calm;cooperative  -TA      Verbalized Emotional State  acceptance  -TA      Recorded by [TA] Kamron Inman, OT 01/29/19 1358      Row Name 01/29/19 1337             Plan of Care Review    Plan of Care Reviewed With  patient  -TA      Recorded by [TA] Kamron Inman, OT 01/29/19 1358      Row Name 01/29/19 1337             Outcome Summary/Treatment Plan (OT)    Daily Summary of Progress (OT)  progress toward functional goals as expected  -TA      Plan for Continued Treatment (OT)  Continue per OT POC  -TA      Anticipated Discharge Disposition (OT)  home with assist;home with home health  -TA      Recorded by [TA] Kamron Inman, OT 01/29/19 1358        User Key  (r) = Recorded By, (t) = Taken By, (c) = Cosigned By    Initials Name Effective Dates Discipline    TA Kamron Inman, OT 03/14/16 -  OT    CC Erica Lam RN 06/16/16 -  Nurse    LL Tran Page RN 06/16/16 -  Nurse    Cyn Leo RN 02/22/17 -  Nurse        Wound 01/21/19 2000 Left posterior head laceration (Active)   Closure Staples;Open to  air 1/29/2019  8:30 AM   Periwound edematous;ecchymotic;dry 1/29/2019  8:30 AM   Periwound Temperature warm 1/29/2019  8:30 AM   Periwound Skin Turgor soft 1/29/2019  8:30 AM   Drainage Amount none 1/29/2019  8:30 AM       Wound 01/22/19 2008 Left elbow abrasion (Active)   Closure Open to air 1/29/2019  8:30 AM   Base scab;dry 1/29/2019  8:30 AM   Periwound dry;redness;blanchable 1/29/2019  8:30 AM   Periwound Temperature warm 1/29/2019  8:30 AM   Periwound Skin Turgor soft 1/29/2019  8:30 AM   Edges irregular 1/29/2019  8:30 AM   Drainage Amount none 1/29/2019  8:30 AM       Wound 01/26/19 2000 coccyx other (see comments) (Active)   Base dry;clean;pink 1/29/2019  8:30 AM   Periwound dry;blanchable;redness 1/29/2019  8:30 AM   Periwound Temperature warm 1/29/2019  8:30 AM   Periwound Skin Turgor soft 1/29/2019  8:30 AM   Drainage Amount none 1/29/2019  8:30 AM   Care, Wound cleansed with;barrier applied;other (see comments) 1/29/2019  8:30 AM   Dressing Care, Wound dressing applied 1/29/2019  8:30 AM       Occupational Therapy Education     Title: PT OT SLP Therapies (Not Started)     Topic: Occupational Therapy (In Progress)     Point: ADL training (Done)     Description: Instruct learner(s) on proper safety adaptation and remediation techniques during self care or transfers.   Instruct in proper use of assistive devices.    Learning Progress Summary           Patient Acceptance, E, VU by TA at 1/29/2019  1:59 PM    Comment:  Safety with ADLs; reinforced need for call for assist with OOB activities.    Acceptance, E,TB,D, VU,DU by KF at 1/28/2019  9:15 AM    Comment:  Safe awareness for LB dressing and incorporation of EC techniques for LB dressing    Acceptance, E, VU,NR by AC at 1/25/2019  2:57 PM    Comment:  adaptive breathing    Acceptance, E,TB,D, DU,VU by KF at 1/23/2019  1:28 PM    Comment:  Purpose of skilled OT services, PLB, EC for ADL completion in toileting, safe seq with RW with cues for environmental  scanning                   Point: Precautions (Done)     Description: Instruct learner(s) on prescribed precautions during self-care and functional transfers.    Learning Progress Summary           Patient Acceptance, E, VU by TA at 1/29/2019  1:59 PM    Comment:  Safety with ADLs; reinforced need for call for assist with OOB activities.    Acceptance, E,TB,D, VU,DU by KF at 1/28/2019  9:15 AM    Comment:  Safe awareness for LB dressing and incorporation of EC techniques for LB dressing    Acceptance, E,TB,D, DU,VU by KF at 1/23/2019  1:28 PM    Comment:  Purpose of skilled OT services, PLB, EC for ADL completion in toileting, safe seq with RW with cues for environmental scanning                   Point: Body mechanics (Done)     Description: Instruct learner(s) on proper positioning and spine alignment during self-care, functional mobility activities and/or exercises.    Learning Progress Summary           Patient Acceptance, E,TB,D, VU,DU by KF at 1/28/2019  9:15 AM    Comment:  Safe awareness for LB dressing and incorporation of EC techniques for LB dressing    Acceptance, E,TB,D, DU,VU by KF at 1/23/2019  1:28 PM    Comment:  Purpose of skilled OT services, PLB, EC for ADL completion in toileting, safe seq with RW with cues for environmental scanning                               User Key     Initials Effective Dates Name Provider Type Discipline     06/23/15 -  Akilah Quiroga, OT Occupational Therapist OT    TA 03/14/16 -  Kamron Inman, OT Occupational Therapist OT    KF 04/03/18 -  Alona Hernandez, OT Occupational Therapist OT                OT Recommendation and Plan  Outcome Summary/Treatment Plan (OT)  Daily Summary of Progress (OT): progress toward functional goals as expected  Plan for Continued Treatment (OT): Continue per OT POC  Anticipated Discharge Disposition (OT): home with assist, home with home health  Daily Summary of Progress (OT): progress toward functional goals as expected  Plan  of Care Review  Plan of Care Reviewed With: patient  Plan of Care Reviewed With: patient  Outcome Summary: VSS; Pt progressing with toileting, Supervision; fxl mobility 250' with RW/SBA in hallway. Good progress toward fxl goals. Continue per OT POC.   Outcome Measures     Row Name 01/29/19 1337 01/28/19 0948 01/28/19 0915       How much help from another person do you currently need...    Turning from your back to your side while in flat bed without using bedrails?  --  4  -DM (r) JR (t) DM (c)  --    Moving from lying on back to sitting on the side of a flat bed without bedrails?  --  4  -DM (r) JR (t) DM (c)  --    Moving to and from a bed to a chair (including a wheelchair)?  --  3  -DM (r) JR (t) DM (c)  --    Standing up from a chair using your arms (e.g., wheelchair, bedside chair)?  --  4  -DM (r) JR (t) DM (c)  --    Climbing 3-5 steps with a railing?  --  3  -DM (r) JR (t) DM (c)  --    To walk in hospital room?  --  3  -DM (r) JR (t) DM (c)  --    AM-PAC 6 Clicks Score  --  21  -EB (r) JR (t)  --       How much help from another is currently needed...    Putting on and taking off regular lower body clothing?  3  -TA  --  3  -KF    Bathing (including washing, rinsing, and drying)  3  -TA  --  3  -KF    Toileting (which includes using toilet bed pan or urinal)  4  -TA  --  3  -KF    Putting on and taking off regular upper body clothing  4  -TA  --  4  -KF    Taking care of personal grooming (such as brushing teeth)  4  -TA  --  4  -KF    Eating meals  4  -TA  --  4  -KF    Score  22  -TA  --  21  -KF       Functional Assessment    Outcome Measure Options  AM-PAC 6 Clicks Daily Activity (OT)  -TA  AM-PAC 6 Clicks Basic Mobility (PT)  -DM (r) JR (t) DM (c)  AM-PAC 6 Clicks Daily Activity (OT)  -KF    Row Name 01/27/19 0952             How much help from another person do you currently need...    Turning from your back to your side while in flat bed without using bedrails?  4  -SJ      Moving from lying on  back to sitting on the side of a flat bed without bedrails?  4  -SJ      Moving to and from a bed to a chair (including a wheelchair)?  3  -SJ      Standing up from a chair using your arms (e.g., wheelchair, bedside chair)?  4  -SJ      Climbing 3-5 steps with a railing?  3  -SJ      To walk in hospital room?  3  -SJ      AM-PAC 6 Clicks Score  21  -SJ         Functional Assessment    Outcome Measure Options  AM-PAC 6 Clicks Basic Mobility (PT)  -SJ        User Key  (r) = Recorded By, (t) = Taken By, (c) = Cosigned By    Initials Name Provider Type    SJ Anna Ibanez, PT Physical Therapist    Carmita Pablo, PT Physical Therapist    Kamron Young, OT Occupational Therapist    Roxi Hilton, LPN Licensed Nurse    Alona Decker, OT Occupational Therapist    Mable Hendrix, PT Student PT Student           Time Calculation:   Time Calculation- OT     Row Name 01/29/19 1402 01/29/19 1337          Time Calculation- OT    OT Start Time  1337 ttc 18 minutes  -TA  --     Total Timed Code Minutes- OT  18 minute(s)  -TA  --     OT Received On  01/29/19  -TA  --     OT Goal Re-Cert Due Date  02/02/19  -TA  --        Timed Charges    92699 - OT Therapeutic Activity Minutes  --  9  -TA     79931 - OT Self Care/Mgmt Minutes  --  9  -TA       User Key  (r) = Recorded By, (t) = Taken By, (c) = Cosigned By    Initials Name Provider Type    Kamron Young, OT Occupational Therapist           Therapy Suggested Charges     Code   Minutes Charges    71069 (CPT®) Hc Ot Neuromusc Re Education Ea 15 Min      50808 (CPT®) Hc Ot Ther Proc Ea 15 Min      13921 (CPT®) Hc Ot Therapeutic Act Ea 15 Min 9 1    95772 (CPT®) Hc Ot Manual Therapy Ea 15 Min      63355 (CPT®) Hc Ot Iontophoresis Ea 15 Min      21348 (CPT®) Hc Ot Elec Stim Ea-Per 15 Min      54267 (CPT®) Hc Ot Ultrasound Ea 15 Min      88083 (CPT®) Hc Ot Self Care/Mgmt/Train Ea 15 Min 9     Total  18 1        Therapy Charges for Today      Code Description Service Date Service Provider Modifiers Qty    [id number]  OT THERAPEUTIC ACT EA 15 MIN 1/29/2019 Kamron Inman, OT GO 1               Kamron Inman OT  1/29/2019    Electronically signed by Kamron Inman OT at 1/29/2019  2:04 PM

## 2019-02-01 NOTE — PLAN OF CARE
Problem: Patient Care Overview  Goal: Plan of Care Review  Outcome: Ongoing (interventions implemented as appropriate)    Goal: Individualization and Mutuality  Outcome: Ongoing (interventions implemented as appropriate)   02/01/19 7279   Individualization   Patient Specific Goals (Include Timeframe) patient has decided to go to Rehab. will be going to [facility] and rehab. awaiting insurance approval       Problem: Skin Injury Risk (Adult)  Goal: Skin Health and Integrity  Outcome: Ongoing (interventions implemented as appropriate)      Problem: Cardiac: ACS (Acute Coronary Syndrome) (Adult)  Goal: Signs and Symptoms of Listed Potential Problems Will be Absent, Minimized or Managed (Cardiac: ACS)  Outcome: Ongoing (interventions implemented as appropriate)

## 2019-02-01 NOTE — PROGRESS NOTES
[facility] Medicine Services  PROGRESS NOTE    Patient Name: Jani Pena  : 1950  MRN: [id number]    Date of Admission: 2019  Length of Stay: 10  Primary Care Physician: Miguel Rojas MD    Subjective     CC: f/u VTACH, nausea/vomiting     HPI:   Pt is seen resting up in bed in NAD.  Family at bs.  Pt is awake and alert.  States he feels okay but has had some n/v this am.  Feels a little weak.  No abd pain, cp or soa.  Smiling and pleasant gentleman.  States he feels like he might need [facility] since [facility] remains weak and lives alone.        Review of Systems   Gen- No fevers, chills  CV- No chest pain, palpitations  Resp- No cough, dyspnea is stable   GI- (+) nausea/vomiting.  No abd pain, + mild intermittent cramps with nausea    Otherwise ROS is negative except as mentioned in the HPI.    Objective     Vital Signs:   Temp:  [97.7 °F (36.5 °C)-98.2 °F (36.8 °C)] 98.2 °F (36.8 °C)  Heart Rate:  [] 85  Resp:  [16-18] 16  BP: (130-132)/(72-76) 130/72     Physical Exam:  Constitutional: Appears older than stated age. Chronically-ill appearing. NAD. Awake, alert and conversant. Family at bs.    HENT: NCAT, mucous membranes moist  Respiratory: Clear to auscultation bilaterally but decreased at bases, respiratory effort normal. On 3L NC. Sats 92%  Cardiovascular: RRR, no murmurs, rubs, or gallops, palpable pedal pulses bilaterally  Gastrointestinal: Positive bowel sounds, soft, nontender, nondistended  Musculoskeletal: No bilateral ankle edema.  NGO spontaneously   Psychiatric: Appropriate affect, cooperative and pleasant   Neurologic: Oriented x 3, strength symmetric in all extremities, Cranial Nerves grossly intact to confrontation, speech clear and appropriate.  Follows commands   Skin: No rashes. Lt upper chest wall with drsg in place to PPM site from sx yesterday.  Drsg c/d/i    Results Reviewed:  I have personally reviewed current lab, radiology,  and data and agree.    Results from last 7 days   Lab Units 01/31/19  0809   WBC 10*3/mm3 9.38   HEMOGLOBIN g/dL 11.2*   HEMATOCRIT % 34.7*   PLATELETS 10*3/mm3 269     Results from last 7 days   Lab Units 01/31/19  0809 01/28/19  0544 01/27/19  1727   SODIUM mmol/L 135 133 134   POTASSIUM mmol/L 4.0 3.7 4.8   CHLORIDE mmol/L 105 103 101   CO2 mmol/L 25.0 25.0 26.0   BUN mg/dL 50* 48* 56*   CREATININE mg/dL 1.50* 1.62* 1.79*   GLUCOSE mg/dL 84 82 132*   CALCIUM mg/dL 8.8 9.2 9.3   ALT (SGPT) U/L  --   --  17   AST (SGOT) U/L  --   --  16     Estimated Creatinine Clearance: 49.9 mL/min (A) (by C-G formula based on SCr of 1.5 mg/dL (H)).    No results found for: BNP    Microbiology Results Abnormal     None        Imaging Results (last 24 hours)     Procedure Component Value Units Date/Time    XR Chest PA & Lateral [[id number]] Collected:  02/01/19 1002     Updated:  02/01/19 1324    Narrative:       EXAMINATION: XR CHEST, PA AND LATERAL-02/01/2019:      INDICATION: PACEMAKER.      COMPARISON: 04/13/2017.     FINDINGS: There is a normal cardiac silhouette. There are chronic  pulmonary changes. There is no acute inflammatory process, mass or  effusion. A dual-lead pacemaker has been inserted since the previous  examination and is well positioned. There is no pneumothorax.           Impression:       There has been interval insertion of a dual-lead pacemaker.  The pacemaker is well positioned. There is no pneumothorax.     D:  02/01/2019  E:  02/01/2019     This report was finalized on 2/1/2019 1:22 PM by Dr. Kamron Armas MD.           Results for orders placed during the hospital encounter of 01/20/19   Adult Transthoracic Echo Complete W/ Cont if Necessary Per Protocol    Narrative Technically difficult study   1) Borderline LVH , moderate dilation of LV with mild reduction systolic   function ( EF is about 45%)  2) Moderate left atrial enlargement with normal LVedp  3) Aortic sclerosis with mild AI   4) Mild MR   5)  Mild TR with PAsp of 48 mm of hg   6) Mild dilation of the RV with normal RV Function   7) Severe hypokinesis of the inferior and septal walls      I have reviewed the medications:  Scheduled Meds:    aspirin 81 mg Oral Daily   atorvastatin 10 mg Oral Nightly   budesonide-formoterol 1 puff Inhalation Daily   clopidogrel 75 mg Oral Daily   docusate sodium 100 mg Oral BID   escitalopram 10 mg Oral Daily   finasteride 5 mg Oral Daily   heparin (porcine) 5,000 Units Subcutaneous Q8H   melatonin 5 mg Sublingual Nightly   metoclopramide 10 mg Oral TID AC   metoprolol tartrate 25 mg Oral Q12H   mirtazapine 15 mg Oral Nightly   oxybutynin 5 mg Oral Nightly   pantoprazole 40 mg Oral QAM AC   polyethylene glycol 17 g Oral Daily   predniSONE 10 mg Oral Daily With Breakfast   sodium chloride 3 mL Intravenous Q12H   spironolactone 25 mg Oral Daily   tamsulosin 0.4 mg Oral Nightly   tobramycin 1 drop Both Eyes Q4H     PRN Meds:  •  acetaminophen  •  albuterol  •  ALPRAZolam  •  baclofen  •  fluticasone  •  nitroglycerin  •  ondansetron  •  ondansetron  •  potassium & sodium phosphates **OR** potassium & sodium phosphates  •  sodium chloride  •  traMADol    Assessment / Plan     Active Hospital Problems    Diagnosis Date Noted   • Ventricular tachycardia (CMS/HCC) [I47.2] 01/22/2019   • N&V (nausea and vomiting) [R11.2] 01/22/2019   • Scalp laceration [S01.01XA] 01/22/2019   • Nausea [R11.0] 01/22/2019     Added automatically from request for surgery [id number]     • Esophageal dysphagia [R13.10] 01/22/2019     Added automatically from request for surgery [id number]     • Syncope and collapse [R55] 01/21/2019   • Anemia [D64.9] 09/11/2018     Added automatically from request for surgery [id number]     • Dysphagia [R13.10] 08/14/2018   • Chronic systolic congestive heart failure (CMS/HCC) [I50.22] 03/19/2018   • Atrial fibrillation (CMS/HCC) [I48.91] 08/09/2016   • Chronic kidney disease, stage III (moderate) (CMS/HCC) [N18.3] 08/09/2016    • Sleep apnea [G47.30] 08/09/2016   • Hypertension [I10] 08/09/2016   • 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 male with PMH significant for HTN, hyperlipidemia, chronic respiratory failure on 3-4L NC, COPD on chronic steroid therapy, CKD III (baseline Cr 1.4), chronic systolic CHF and atrial fibrillation (s/p implantable pulse generator 2006). Admitted to [facility] 1/20/19 after syncopal event with head injury/scalp laceration. Transferred to [facility] 1/22/19 for ventricular arrhythmia and elevated troponin.     Scalp laceration  - Staples placed 1/20 at [facility] - removed 1/30  --stable.  No bleeding noted.     Syncope  NSVT   High-grade AV block  - Cardiology following   - Patient needs beta blockade due to his history of NSVT and LV dysfunction.   - Dr. Ecninas to place PPM yesterday 1/31/19  --no c/o palpitation, cp or soa today 2/1    Non-obstructive CAD   Elevated troponins  - Troponin 0.034 --> 0.057. EKG NSR with frequent PVCs. Cardiology does not plan for further ischemic evaluation   - 5/2018 [facility] with mild diffuse atherosclerosis and no significant flow-limiting CAD   - Continue DAPT/statin/BB     Chronic systolic heart failure  - ECHO 1/2/19 - EF 45%     Mild oral dysphagia  Esophageal dyspnagia  Esophageal dysmotility   - s/p esophageal dilation historically - missed a recent appointment 5 months ago for repeat dilation   - MBS/FEES on 1/13 - mild oral dysphagia. Soft textures, thin liquids   - Needs outpatient esophageal dilation at some point - will need to be off anticoagulation     Persistent nausea/vomiting   Gastroparesis   - Appreciate GI evaluation. S/p EGD on 1/26/19. EGD showed a large amount of undigested food in the stomach with no evidence of gastric outlet obstruction, consistent with gastroparesis  --still intermittent n/v today 2/1/19  - IV Reglan - switched to PO while in-house  - At d/c, stop reglan - Cassandra Root 500mg  PO TID before meals   - Boost with meals    Chronic respiratory failure with hypoxia  COPD, steroid-dependent  - At baseline, on prednisone 10mg daily   --continue home oxygen    CKD III, baseline creatinine 1.4. Improved to 1.50 last ckd 1/31  HTN, BP is controlled  Hyperlipidemia, statin   PEDRO, NIPPV QHS     Weakness/debility  --pt now feels he would best benefit from short term rehab  --CM on board for dc planning    DVT Prophylaxis:  Heparin   Disposition: I expect the patient to be discharged TBD    CODE STATUS:   Code Status and Medical Interventions:   Ordered at: 01/23/19 1908     Level Of Support Discussed With:    Patient     Code Status:    CPR     Medical Interventions (Level of Support Prior to Arrest):    Full     Electronically signed by PAL Perez, 02/01/19, 2:43 PM.

## 2019-02-01 NOTE — PROGRESS NOTES
"[facility] Progress Note     LOS: 10 days   Patient Care Team:  Miguel Rojas MD as PCP - General  Miguel Rojas MD as PCP - Family Medicine  PCP:  Miguel Rojas MD    Chief Complaint:  Nausea persists      SUBJECTIVE:   PPM yesterday. Some soreness. N/V better. No other complaints.       Review of Systems:   All systems have been reviewed and are negative with the exception of those mentioned above.      OBJECTIVE:    Vital Sign Min/Max for last 24 hours  Temp  Min: 97.3 °F (36.3 °C)  Max: 98.2 °F (36.8 °C)   BP  Min: 111/73  Max: 142/72   Pulse  Min: 65  Max: 91   Resp  Min: 12  Max: 18   SpO2  Min: 92 %  Max: 99 %   No Data Recorded   No Data Recorded     Flowsheet Rows      First Filed Value   Admission Height  172.7 cm (68\") Documented at 01/22/2019 1300   Admission Weight  75.4 kg (166 lb 3.2 oz) Documented at 01/22/2019 1300          Telemetry: sr      Intake/Output Summary (Last 24 hours) at 2/1/2019 0741  Last data filed at 2/1/2019 0717  Gross per 24 hour   Intake 860 ml   Output 1925 ml   Net -1065 ml     Intake & Output (last 3 days)       01/29 0701 - 01/30 0700 01/30 0701 - 01/31 0700 01/31 0701 - 02/01 0700 02/01 0701 - 02/02 0700    P.O. 240 480 360     I.V. (mL/kg)   500 (6.7)     Total Intake(mL/kg) 240 (3.2) 480 (6.4) 860 (11.5)     Urine (mL/kg/hr) 1980 (1.1) 1225 (0.7) 1425 (0.8) 500 (10)    Stool  0      Total Output 1980 1225 1425 500    Net -1740 -745 -565 -500            Stool Unmeasured Occurrence  1 x             Physical Exam:  Physical Exam  NC )2 3 L  s1s2  Ppm site bandaged  I/E wheeze diffuse  abd soft +bs  LUE sling  Ext 1+ ed    LABS/DIAGNOSTIC DATA:  Results from last 7 days   Lab Units 01/31/19  0809   WBC 10*3/mm3 9.38   HEMOGLOBIN g/dL 11.2*   HEMATOCRIT % 34.7*   PLATELETS 10*3/mm3 269     No results found for: TROPONINT      Results from last 7 days   Lab Units 01/31/19  0809 01/28/19  0544 01/27/19  1727   SODIUM mmol/L 135 133 134 "   POTASSIUM mmol/L 4.0 3.7 4.8   CHLORIDE mmol/L 105 103 101   CO2 mmol/L 25.0 25.0 26.0   BUN mg/dL 50* 48* 56*   CREATININE mg/dL 1.50* 1.62* 1.79*   CALCIUM mg/dL 8.8 9.2 9.3   BILIRUBIN mg/dL  --   --  0.9   ALK PHOS U/L  --   --  82   ALT (SGPT) U/L  --   --  17   AST (SGOT) U/L  --   --  16   GLUCOSE mg/dL 84 82 132*                       Medication Review:     aspirin 81 mg Oral Daily   atorvastatin 10 mg Oral Nightly   budesonide-formoterol 1 puff Inhalation Daily   clopidogrel 75 mg Oral Daily   docusate sodium 100 mg Oral BID   escitalopram 10 mg Oral Daily   finasteride 5 mg Oral Daily   heparin (porcine) 5,000 Units Subcutaneous Q8H   melatonin 5 mg Sublingual Nightly   metoclopramide 10 mg Oral TID AC   metoprolol tartrate 25 mg Oral Q12H   mirtazapine 15 mg Oral Nightly   oxybutynin 5 mg Oral Nightly   pantoprazole 40 mg Oral QAM AC   polyethylene glycol 17 g Oral Daily   predniSONE 10 mg Oral Daily With Breakfast   sodium chloride 3 mL Intravenous Q12H   spironolactone 25 mg Oral Daily   tamsulosin 0.4 mg Oral Nightly   tobramycin 1 drop Both Eyes Q4H              Atrial fibrillation (CMS/HCC)    Chronic kidney disease, stage III (moderate) (CMS/HCC)    Steroid-dependent COPD (CMS/HCC)    Hypertension    Sleep apnea    Chronic systolic congestive heart failure (CMS/HCC)    Dysphagia    Anemia    Syncope and collapse    Ventricular tachycardia (CMS/HCC)    N&V (nausea and vomiting)    Scalp laceration    Nausea    Esophageal dysphagia      ASSESSMENT/PLAN:    High grade AV block  - noted w syncope as outpt felt secondary previously to hypoxia.    - 6 second pause w high grade AV block 1/27/19  - Pt needs beta blockade for NSVT and LV dysfxn.  - s/p [place] PPM 1/31/19 per Dr. Encinas    Reduced LV systolic function  - Echo 1/21: EF 45%     CAD, non obstructive   - LHC as above  - On DAPT, statin, BB     HTN  - controlled  - continue current meds     HLD  - Continue statin     CKD  - monitor closely  with daily BMP     COPD  - per hospitalists      PEDRO  - on CPAP HS    Pt stable to d/c from cardiac perspective. Home soon per hospitalists.    Electronically signed by CARMENZA Steiner, 02/01/19, 7:37 AM.

## 2019-02-01 NOTE — PLAN OF CARE
Problem: Patient Care Overview  Goal: Plan of Care Review  Outcome: Ongoing (interventions implemented as appropriate)   02/01/19 1050   Coping/Psychosocial   Plan of Care Reviewed With patient   Plan of Care Review   Progress improving   OTHER   Outcome Summary Pt. supervision for transfers, and bed mobility supine-sit. Pt. max for scooting and bed mobility due to LUE percautions from PPM. Pt. set-up for grooming, and supervision set-up for LBD pants/bottoms. Pt. educated on EC/WS, but would benefit from EC/WS educational material next session. Pt. frequent rest breaks, and complains of constant nausea. Pt. limited by strength, endurance, balance, and nausea.

## 2019-02-01 NOTE — PROGRESS NOTES
[facility] at [facility]  Cardiovascular Consultation Note  Jani Pena  [id number]  1950    DATE OF ADMISSION: 1/22/2019  DATE OF FOLLOW UP:  1/30/2019    Miguel Rojas MD    Subjective:     Patient ID: Jani Pena is a 68 y.o. male.    Chief Complaint: Bradycardia     Allergies   Allergen Reactions   • Mucomyst [Acetylcysteine] Other (See Comments)     bronchospasms   • Milk-Related Compounds Nausea And Vomiting   • Sulfa Antibiotics Nausea And Vomiting       Current Facility-Administered Medications:   •  acetaminophen (TYLENOL) tablet 650 mg, 650 mg, Oral, 4x Daily PRN, Mercy Bryant APRN  •  albuterol (PROVENTIL) nebulizer solution 0.083% 2.5 mg/3mL, 2.5 mg, Nebulization, Q4H PRN, Denita Tavares B, APRN, 2.5 mg at 01/30/19 0836  •  ALPRAZolam (XANAX) tablet 0.5 mg, 0.5 mg, Oral, Nightly PRN, Deloris-Marty Denita B, APRN, 0.5 mg at 01/31/19 2023  •  aspirin EC tablet 81 mg, 81 mg, Oral, Daily, Deloris-Jaime Fergusonice B, APRN, 81 mg at 02/01/19 0845  •  atorvastatin (LIPITOR) tablet 10 mg, 10 mg, Oral, Nightly, Ulysses Bass MD, 10 mg at 01/31/19 2022  •  baclofen (LIORESAL) tablet 10 mg, 10 mg, Oral, TID PRN, Christa Trejo APRN, 10 mg at 01/28/19 1705  •  budesonide-formoterol (SYMBICORT) 80-4.5 MCG/ACT inhaler 1 puff, 1 puff, Inhalation, Daily, Denita Tavares APRN, 1 puff at 02/01/19 0854  •  clopidogrel (PLAVIX) tablet 75 mg, 75 mg, Oral, Daily, Jaime Tavaresice B, APRN, 75 mg at 02/01/19 0844  •  docusate sodium (COLACE) capsule 100 mg, 100 mg, Oral, BID, Venessa Hilliard PA-C, 100 mg at 02/01/19 0844  •  escitalopram (LEXAPRO) tablet 10 mg, 10 mg, Oral, Daily, Denita Tavares, APRN, 10 mg at 02/01/19 0844  •  finasteride (PROSCAR) tablet 5 mg, 5 mg, Oral, Daily, Denita Tavares, APRN, 5 mg at 02/01/19 0844  •  fluticasone (FLONASE) 50 MCG/ACT nasal spray 1 spray, 1 spray, Each Nare, Daily PRN, Denita Tavares,  APRN  •  heparin (porcine) 5000 UNIT/ML injection 5,000 Units, 5,000 Units, Subcutaneous, Q8H, Rolf Johnson MD, 5,000 Units at 02/01/19 0505  •  melatonin sublingual tablet 5 mg, 5 mg, Sublingual, Nightly, Venessa Hilliard PA-C, 5 mg at 01/31/19 2022  •  metoclopramide (REGLAN) tablet 10 mg, 10 mg, Oral, TID AC, Venessa Hilliard PA-C, 10 mg at 02/01/19 0845  •  metoprolol tartrate (LOPRESSOR) tablet 25 mg, 25 mg, Oral, Q12H, Denita Tavares APRN, 25 mg at 02/01/19 0845  •  mirtazapine (REMERON) tablet 15 mg, 15 mg, Oral, Nightly, Venessa Hilliard PA-C, 15 mg at 01/31/19 2023  •  nitroglycerin (NITROSTAT) SL tablet 0.4 mg, 0.4 mg, Sublingual, Q5 Min PRN, Denita Tavares, APRN  •  ondansetron (ZOFRAN) injection 4 mg, 4 mg, Intravenous, Q6H PRN, Shawanda Swain PA, 4 mg at 02/01/19 0911  •  ondansetron (ZOFRAN) tablet 4 mg, 4 mg, Oral, Q6H PRN, Venessa Hilliard PA-C, 4 mg at 01/31/19 1440  •  oxybutynin (DITROPAN) tablet 5 mg, 5 mg, Oral, Nightly, Denita Tavares APRN, 5 mg at 01/31/19 2023  •  pantoprazole (PROTONIX) EC tablet 40 mg, 40 mg, Oral, QAM , Venessa Hilliard PA-C, 40 mg at 02/01/19 0845  •  polyethylene glycol 3350 powder (packet), 17 g, Oral, Daily, Venessa Hilliard PA-C, 17 g at 02/01/19 0844  •  potassium & sodium phosphates (PHOS-NAK) 280-160-250 MG packet - for Phosphorus less than 1.25 mg/dL, 2 packet, Oral, Q6H PRN **OR** potassium & sodium phosphates (PHOS-NAK) 280-160-250 MG packet - for Phosphorus 1.25 - 2.5 mg/dL, 2 packet, Oral, Once PRN, Tej Webb MD, 2 packet at 01/28/19 1615  •  predniSONE (DELTASONE) tablet 10 mg, 10 mg, Oral, Daily With Breakfast, Venessa Hilliard PA-C, 10 mg at 02/01/19 0844  •  sodium chloride 0.9 % flush 3 mL, 3 mL, Intravenous, Q12H, Huang, Omar, DO, 3 mL at 02/01/19 0845  •  sodium chloride 0.9 % flush 3-10 mL, 3-10 mL, Intravenous, PRN, Huang, Omar, DO  •  spironolactone  (ALDACTONE) tablet 25 mg, 25 mg, Oral, Daily, Kuns-Ferguson, Denita B, APRN, 25 mg at 02/01/19 0844  •  tamsulosin (FLOMAX) 24 hr capsule 0.4 mg, 0.4 mg, Oral, Nightly, Kuns-Ferguson, Denita B, APRN, 0.4 mg at 01/31/19 2022  •  tobramycin 0.3 % ophthalmic solution 1 drop, 1 drop, Both Eyes, Q4H, Rolf Johnson MD, 1 drop at 02/01/19 0844  •  traMADol (ULTRAM) tablet 50 mg, 50 mg, Oral, Q8H PRN, Kuns-Ferguson, Denita B, APRN, 50 mg at 01/31/19 1722    History of Present Illness  Doing ok at this time. S/p PPM yesterday. No major issues.     The following portions of the patient's history were reviewed and updated as appropriate: allergies, current medications, past family history, past medical history, past social history, past surgical history and problem list.    ROS   14 point ROS negative except as outlined in problem list, HPI and other parts of the note.    Procedures       Objective:       Vitals:    02/01/19 0717 02/01/19 0844 02/01/19 0854 02/01/19 1100   BP: 130/72 130/72     BP Location: Right arm      Patient Position: Lying      Pulse: 81 101 79 85   Resp: 16      Temp: 98.2 °F (36.8 °C)      TempSrc: Oral      SpO2: 92%  95% 97%   Weight:       Height:           Intake/Output Summary (Last 24 hours) at 2/1/2019 1158  Last data filed at 2/1/2019 0900  Gross per 24 hour   Intake 360 ml   Output 1925 ml   Net -1565 ml       GENERAL: Well-developed, well-nourished patient in no acute distress.  HEENT: Normocephalic, atraumatic, PERRLA. Moist mucous membranes.  NECK: No JVD present at 30°. No carotid bruits auscultated.  LUNGS: Clear to auscultation, decreased at the bases. On O2  CARDIOVASCULAR: Heart has a regular rate and rhythm. No murmurs, gallops or rubs noted.   ABDOMEN: Soft, nontender. Positive bowel sounds.  MUSCULOSKELETAL: No gross deformities. No clubbing, cyanosis  EXT: pulses intact, no swelling  SKIN: Pink, warm  Neuro: Nonfocal exam. Gait intact      The patient's old records including  ambulatory rhythm recordings (ECGs, Holter/event monitor) were reviewed and discussed.      Lab Review:   Results from last 7 days   Lab Units 01/31/19  0809 01/28/19  0544 01/27/19  1727   SODIUM mmol/L 135 133 134   POTASSIUM mmol/L 4.0 3.7 4.8   CHLORIDE mmol/L 105 103 101   CO2 mmol/L 25.0 25.0 26.0   BUN mg/dL 50* 48* 56*   CREATININE mg/dL 1.50* 1.62* 1.79*   GLUCOSE mg/dL 84 82 132*   CALCIUM mg/dL 8.8 9.2 9.3         Results from last 7 days   Lab Units 01/31/19  0809   WBC 10*3/mm3 9.38   HEMOGLOBIN g/dL 11.2*   HEMATOCRIT % 34.7*   PLATELETS 10*3/mm3 269             Results from last 7 days   Lab Units 01/28/19  0544   MAGNESIUM mg/dL 2.4         TELE: NSR, RBBB              Assessment & Plan:   Syncope/ near Syncope  - [facility] 5/2018: mild diffuse atherosclerosis with no significant flow-limiting obstructive coronary artery disease.   - Troponin 0.034-->0.057; EKG NSR with frequent PACs, PVCs, and reported bifasicular block, ST changes previously noted in anterior leads without sig change.   No ischemia workup needed at this time per Dr Bass.   - E-lytes WNL  - Run of NSVT on tele on 1/21 up to 4-5 beats and after discussion with the patient he did not feel any different at that time just had a very bad night and felt short of breath and just not himself.  Also, overnight 1/21/19 his oxygenation did drop into the 70s since his oxygen was not on while wearing CPAP.  - On BB  - Now status post dual-chamber permanent pacemaker yesterday for chronic symptomatic sinus bradycardia due to sick sinus syndrome as well as pauses.  Ejection fraction 45%.  This will all discussed with  and proceeded with a dual-chamber device.  Patient is currently AP VS.       Reduced LV systolic function  - Echo 1/21: EF 45%     CAD, non obstructive   - [facility] as above  - On DAPT, statin, BB     HTN  - controlled  - continue current meds     HLD  - Continue statin     CKD  - Creatinine 1.62  - monitor closely with daily  BMP     COPD  - continue inhalers and O2  - Uses intermittent CPAP as well  - 4 L O2 at home.   - appreciate hospitalists management of this     PEDRO  - on CPAP HS     Dysphagia  - S/p esophageal dilation historically and missed appointment for egd 5 months ago.  Medicine following.     EP will sign off for now with wound check in 7-10 days and Follow up in 3 mths with a device check ([facility])    mOar Encinas DO  02/01/19  11:58 AM

## 2019-02-01 NOTE — PROGRESS NOTES
Adult Nutrition  Assessment/PES    Patient Name:  Jani Pena  YOB: 1950  MRN: [id number]  Admit Date:  1/22/2019    Assessment Date:  2/1/2019      Reason for Assessment     Row Name 02/01/19 1629 02/01/19 1626       Reason for Assessment    Reason For Assessment  -- 30mins.  follow-up protocol    Diagnosis  -- per notes this adm  --        Nutrition/Diet History     Row Name 02/01/19 1631          Nutrition/Diet History    Factors Affecting Nutritional Intake  -- pt states he is still suffering with sx of gastroparesis.           Labs/Tests/Procedures/Meds     Row Name 02/01/19 1634          Diagnostic Tests/Procedures    Diagnostic Test/Procedure Reviewed  reviewed   1/23 MBS=soft texture, whole foods with thin liquids.              Nutrition Prescription Ordered     Row Name 02/01/19 1636 02/01/19 1634       Nutrition Prescription PO    Current PO Diet  --  Soft Texture   pt acknowleges that he needs soft texture foods consistently. RD assured diet order correct in Epic.    Texture  --  Whole foods    Fluid Consistency  --  Thin    Supplement  Ensure HP  --    Supplement Frequency  3 times a day   pt reports he is drinking 2 or more each day.  --        Evaluation of Received Nutrient/Fluid Intake     Row Name 02/01/19 1635 02/01/19 1634       PO Evaluation    Number of Meals  --  5    % PO Intake  -- much improved po intake  65              Problem/Interventions:  Problem 1     Row Name 02/01/19 1635          Nutrition Diagnoses Problem 1    Problem 1  Nutrition Appropriate for Condition at this Time with po intake on meal trays plus nutrition supplement intake.     Etiology (related to)  MNT for Treatment/Condition     Signs/Symptoms (evidenced by)  PO Intake     Percent (%) intake recorded  65 %   and pt is drinking 2 or more ENsure HP supplements.     Over number of meals  5; improved po intake                 Intervention Goal     Row Name 02/01/19 1637 02/01/19 1635       Intervention  Goal    PO  Continue positive trend  Increase intake        Nutrition Intervention     Row Name 02/01/19 1637          Nutrition Intervention    RD/Tech Action  Supplement provided;Follow Tx progress;Encourage intake           Education/Evaluation     Row Name 02/01/19 1626          Education    Education  Education offered and refused Offered further diet edu on gastroparesis, but pt states he understands concepts and has written materials at bedside (observed by RD). RD avail for f/u prn.         Monitor/Evaluation    Monitor  Per protocol           Electronically signed by:  Joslyn Burch MS,RD,LD  02/01/19 4:38 PM

## 2019-02-01 NOTE — PLAN OF CARE
Problem: Patient Care Overview  Goal: Plan of Care Review  Outcome: Ongoing (interventions implemented as appropriate)   02/01/19 7736   Coping/Psychosocial   Plan of Care Reviewed With patient   Plan of Care Review   Progress improving   OTHER   Outcome Summary patient ambulated 220 feet with CGA and 3 liters per NC. Gait distance increased with nno increase in pain or SOA.

## 2019-02-01 NOTE — THERAPY TREATMENT NOTE
Acute Care - Occupational Therapy Treatment Note  [facility]     Patient Name: Jani Pena  : 1950  MRN: [id number]  Today's Date: 2019  Onset of Illness/Injury or Date of Surgery: 19  Date of Referral to OT: 19  Referring Physician: PAL Trejo    Admit Date: 2019       ICD-10-CM ICD-9-CM   1. Impaired functional mobility, balance, gait, and endurance Z74.09 V49.89   2. Oral phase dysphagia R13.11 787.21   3. Impaired mobility and ADLs Z74.09 799.89   4. Persistent atrial fibrillation (CMS/HCC) I48.1 427.31   5. Chronic systolic congestive heart failure (CMS/HCC) I50.22 428.22     428.0   6. Ventricular tachycardia (CMS/HCC) I47.2 427.1   7. Type 2 diabetes mellitus with complication, without long-term current use of insulin (CMS/[facility]) E11.8 250.90   8. Physical deconditioning R53.81 799.3   9. Nausea R11.0 787.02   10. Esophageal dysphagia R13.10 787.20     Patient Active Problem List   Diagnosis   • Anxiety   • Atherosclerotic heart disease of native coronary artery without angina pectoris   • Atrial fibrillation (CMS/HCC)   • Chronic kidney disease, stage III (moderate) (CMS/HCC)   • Steroid-dependent COPD (CMS/HCC)   • Degeneration of cervical intervertebral disc   • Diabetes mellitus (CMS/HCC)   • GERD without esophagitis   • Diverticulosis   • Hemorrhoids   • Hiatal hernia   • Hyperlipidemia   • Hypertension   • Kyphosis, acquired   • Mitral and aortic valve disease   • Sleep apnea   • Enlarged prostate without lower urinary tract symptoms (luts)   • History of arthritis   • Back pain   • Depression   • Stroke syndrome   • History of ventricular septal defect   • Neck pain   • Impaired mobility and ADLs   • Physical deconditioning   • Chronic systolic congestive heart failure (CMS/HCC)   • Atherosclerosis of native coronary artery of native heart with unstable angina pectoris (CMS/HCC)   • Lumbar radiculopathy   • Dysphagia   • Heartburn   • Anemia   • Syncope and collapse    • Ventricular tachycardia (CMS/HCC)   • N&V (nausea and vomiting)   • Scalp laceration   • Nausea   • Esophageal dysphagia     Past Medical History:   Diagnosis Date   • Abnormal liver enzymes    • Anemia    • Anxiety    • Arthritis    • Atherosclerotic heart disease of native coronary artery without angina pectoris    • Atrial fibrillation (CMS/HCC)    • Back pain    • BPH (benign prostatic hyperplasia)    • Cataract, bilateral    • Chest pain     2-3 MONTHS AGO.  PER PT, HAS ADDRESSED WITH CARDIOLOGIST.  STATES HAS NTG PRN.   • CHF (congestive heart failure) (CMS/HCC)    • Chronic bronchitis (CMS/HCC)    • Chronic kidney disease    • COPD (chronic obstructive pulmonary disease) (CMS/HCC)    • Degeneration of cervical intervertebral disc    • Depression    • Diverticulosis    • Dyspnea    • Esophageal reflux    • Esophagitis    • Gastritis    • Hematuria    • Hemorrhoids    • Hiatal hernia    • History of arthritis    • History of nuclear stress test     UNSURE OF DATE   • History of peripheral neuropathy    • History of ventricular septal defect    • History of ventricular septal defect    • Hyperlipidemia    • Hypertension    • Infectious diarrhea    • Kyphosis, acquired    • Lumbar radiculopathy    • Mitral and aortic valve disease    • Nephrolithiasis    • Phimosis    • Problems with swallowing     WITH FOOD   • Respiratory failure (CMS/HCC)    • Sleep apnea     BIPAP   • TIA (transient ischemic attack)     X3.   2014   • Ventral hernia    • Wears glasses     FOR READING     Past Surgical History:   Procedure Laterality Date   • CARDIAC CATHETERIZATION     • CARDIAC CATHETERIZATION N/A 5/24/2018    Procedure: Left Heart Cath;  Surgeon: Edouard Robertson MD;  Location:  [facility] CATH INVASIVE LOCATION;  Service: Cardiovascular   • CARDIAC ELECTROPHYSIOLOGY PROCEDURE N/A 1/31/2019    Procedure: PACEMAKER IMPLANTATION- DC;  Surgeon: Omar Encinas DO;  Location:  [facility] INVASIVE LOCATION;  Service: Cardiology   •  ENDOSCOPY N/A 1/26/2019    Procedure: ESOPHAGOGASTRODUODENOSCOPY;  Surgeon: Brunner, Mark I, MD;  Location: [facility] ENDOSCOPY;  Service: Gastroenterology   • HERNIA REPAIR     • ORTHOPEDIC SURGERY Left     Left hip arthroplasty   • SUPRAPUBIC CATHETER INSERTION      History of Percutaneous Catheter Placement Into Ureter   • THROAT SURGERY      FOR SLEEP APNEA   • VSD REPAIR      History of VSD Repair By Patch Closure       Therapy Treatment    Rehabilitation Treatment Summary     Row Name 02/01/19 1050 02/01/19 0822          Treatment Time/Intention    Discipline  occupational therapist  (Pended)   -  physical therapy assistant  -AS     Document Type  therapy note (daily note)  (Pended)   -  therapy note (daily note)  -AS     Subjective Information  complains of;nausea/vomiting  (Pended)   -  complains of;weakness;fatigue;pain  -AS     Mode of Treatment  occupational therapy  (Pended)   -  physical therapy  -AS     Patient/Family Observations  no family present upon entryu. Pt. w/ nursing finishing bath standing in room w/ no gait belt, and unsupported standing  (Pended)   -  no family at bedside, patient supine with sling for pacemaker  -AS     Care Plan Review  care plan/treatment goals reviewed;risks/benefits reviewed;current/potential barriers reviewed;patient/other agree to care plan  (Pended)   -  --     Total Minutes, Occupational Therapy Treatment  35  (Pended)   -  --     Patient Effort  good  (Pended)   -  good  -AS     Comment  Pt. received EC/WS education; Next session pt. would beneift from EC/WS written material  (Significant)   (Pended)   -C  repostioned sling prior to OOB, nsg removed once back from walk.  -AS     Existing Precautions/Restrictions  fall;oxygen therapy device and L/min;pacemaker  (Pended)   -  fall;oxygen therapy device and L/min;pacemaker  -AS     Treatment Considerations/Comments  PPM on left; frequent nausea, increased dyspnea.  (Pended)   -  --     Patient  Response to Treatment  tolerated BUE well, frequent rest breaks; increased dyspnea  (Pended)   -ZC  --     Recorded by [ZC] Class, Bo HUI OT Student 02/01/19 1151 [AS] Nicole Daniel, PTA 02/01/19 0947     Row Name 02/01/19 1050 02/01/19 0822          Vital Signs    Pre Systolic BP Rehab  130  (Pended)   -ZC  --     Pre Treatment Diastolic BP  72  (Pended)   -ZC  --     Pretreatment Heart Rate (beats/min)  74  (Pended)   -ZC  --     Posttreatment Heart Rate (beats/min)  73  (Pended)   -ZC  --     Pre SpO2 (%)  96  (Pended)   -ZC  95  -AS     O2 Delivery Pre Treatment  supplemental O2  (Pended)   -ZC  supplemental O2  -AS     Intra SpO2 (%)  --  88  -AS     O2 Delivery Intra Treatment  --  supplemental O2  -AS     Post SpO2 (%)  95  (Pended)   -ZC  94  -AS     O2 Delivery Post Treatment  supplemental O2  (Pended)   -ZC  supplemental O2  -AS     Pre Patient Position  Standing  (Pended)   -ZC  Supine  -AS     Intra Patient Position  Sitting  (Pended)   -ZC  Standing  -AS     Post Patient Position  Supine  (Pended)   -ZC  Sitting  -AS     Recovery Time  Vaired 15sec- 1 min  (Pended)   -ZC  --     Rest Breaks   7  (Pended)   -ZC  --     Recorded by [ZC] Vaibhav, Bo HUI OT Student 02/01/19 1151 [AS] Nicole Daniel, PTA 02/01/19 0947     Row Name 02/01/19 1050             Cognitive Assessment/Intervention    Additional Documentation  Cognitive Assessment/Intervention (Group)  (Pended)   -ZC      Recorded by [ZC] Vaibhav, Bo HUI OT Student 02/01/19 1151      Row Name 02/01/19 1050 02/01/19 0822          Cognitive Assessment/Intervention- PT/OT    Affect/Mental Status (Cognitive)  WFL  (Pended)   -ZC  WFL  -AS     Orientation Status (Cognition)  oriented x 4  (Pended)   -ZC  oriented x 4  -AS     Follows Commands (Cognition)  WFL  (Pended)   -ZC  WFL  -AS     Cognitive Function (Cognitive)  safety deficit  (Pended)   -ZC  --     Safety Deficit (Cognitive)  mild deficit;impulsivity;insight into  deficits/self awareness  (Pended)   -  mild deficit  -AS     Cognitive Interventions (Cognitive)  occupation/activity based interventions  (Pended)   -  --     Personal Safety Interventions  fall prevention program maintained;gait belt;nonskid shoes/slippers when out of bed  (Pended)   -  fall prevention program maintained;gait belt;nonskid shoes/slippers when out of bed  -AS     Recorded by [] Bo Esposito OT Student 02/01/19 1151 [AS] Nicole Daniel, PTA 02/01/19 0947     Row Name 02/01/19 1050             Safety Issues, Functional Mobility    Safety Issues Affecting Function (Mobility)  impulsivity;insight into deficits/self awareness;awareness of need for assistance  (Pended)   -      Impairments Affecting Function (Mobility)  endurance/activity tolerance;range of motion (ROM);postural/trunk control;strength;shortness of breath;coordination;balance  (Pended)   -      Recorded by [] Bo Esposito OT Student 02/01/19 1151      Row Name 02/01/19 1050 02/01/19 0822          Bed Mobility Assessment/Treatment    Bed Mobility Assessment/Treatment  scooting/bridging;sit-supine  (Pended)   -  --     Scooting/Bridging [place] (Bed Mobility)  maximum assist (25% patient effort);2 person assist  (Pended)  PPM percuations  -  --     Supine-Sit [place] (Bed Mobility)  supervision;verbal cues  (Pended)   -  verbal cues;supervision  -AS     Bed Mobility, Safety Issues  decreased use of arms for pushing/pulling  (Pended)   -  --     Assistive Device (Bed Mobility)  draw sheet;bed rails  (Pended)   -  --     Comment (Bed Mobility)  Cues for LUE percautions; Unable to use LUE due to PPM percautions  (Pended)   -  cues for pacemaker protection with L UE, in sling.  -AS     Recorded by [] Bo Esposito OT Student 02/01/19 1151 [AS] Nicole Daniel, PTA 02/01/19 0944     Row Name 02/01/19 1052             Functional Mobility    Functional Mobility- Ind. Level   supervision required  (Pended)   -Z      Functional Mobility- Device  rolling walker  (Pended)   -Z      Functional Mobility-Distance (Feet)  50  (Pended)   -      Functional Mobility- Safety Issues  supplemental O2;step length decreased  (Pended)   -ZC      Recorded by [Z] Vaibhav, Bo HUI OT Student 02/01/19 1151      Row Name 02/01/19 1050             Transfer Assessment/Treatment    Transfer Assessment/Treatment  sit-stand transfer;stand-sit transfer  (Pended)   -ZC      Recorded by [] Vaibhav, Bo HUI OT Student 02/01/19 1151      Row Name 02/01/19 1050 02/01/19 0822          Sit-Stand Transfer    Sit-Stand Hitchcock (Transfers)  supervision  (Pended)   -  verbal cues;contact guard  -AS     Assistive Device (Sit-Stand Transfers)  other (see comments)  (Pended)  No AD  -ZC  other (see comments) no AD  -AS     Recorded by [] Vaibhav, Bo HUI OT Student 02/01/19 1151 [AS] Nicole Daniel, PTA 02/01/19 0947     Row Name 02/01/19 1050 02/01/19 0822          Stand-Sit Transfer    Stand-Sit Hitchcock (Transfers)  supervision  (Pended)   -Z  verbal cues;contact guard  -AS     Assistive Device (Stand-Sit Transfers)  other (see comments)  (Pended)  No AD  -ZC  other (see comments) no AD, L UE in sling for comfort per pts request  -AS     Recorded by [] Vaibhav, Bo HUI OT Student 02/01/19 1151 [AS] Nicole Daniel, PTA 02/01/19 0947     Row Name 02/01/19 0822             Gait/Stairs Assessment/Training    49969 - Gait Training Minutes   15  -AS      Gait/Stairs Assessment/Training  gait/ambulation assistive device  -AS      Hitchcock Level (Gait)  verbal cues;contact guard  -AS      Assistive Device (Gait)  other (see comments) none  -AS      Distance in Feet (Gait)  220  -AS      Pattern (Gait)  step-through  -AS      Deviations/Abnormal Patterns (Gait)  bilateral deviations;base of support, narrow;pati decreased;gait speed decreased  -AS      Comment (Gait/Stairs)  patient  ambulated on 3 liters O2 per NC, some SOA with activity but was able to increase gait distance.  -AS      Recorded by [AS] Merlinepolina Nicoleselena Flores, [facility] 02/01/19 0947      Row Name 02/01/19 1050             ADL Assessment/Intervention    00547 - OT Self Care/Mgmt Minutes  13  (Pended)   -ZC      BADL Assessment/Intervention  lower body dressing;grooming  (Pended)   -ZC      Recorded by [ZC] Bo Esposito OT Student 02/01/19 1151      Row Name 02/01/19 1050             Lower Body Dressing Assessment/Training    Lower Body Dressing Grimes Level  supervision;set up;verbal cues;don;doff;pants/bottoms  (Pended)  x2  -ZC      Lower Body Dressing Position  unsupported sitting  (Pended)   -ZC      Comment (Lower Body Dressing)  VC for sequencing to reduce risk of fall  (Pended)   -ZC      Recorded by [ZC] Bo Esposito OT Student 02/01/19 1151      Row Name 02/01/19 1050             Grooming Assessment/Training    Grimes Level (Grooming)  shave face  (Pended)   -ZC      Assistive Devices (Grooming)  electric razor  (Pended)   -ZC      Grooming Position  unsupported sitting  (Pended)   -ZC      Recorded by [Z] Bo Esposito OT Student 02/01/19 1151      Row Name 02/01/19 1050             BADL Safety/Performance    Impairments, BADL Safety/Performance  endurance/activity tolerance;range of motion;strength;shortness of breath  (Pended)   -ZC      Skilled BADL Treatment/Intervention  energy conservation;BADL process/adaptation training  (Pended)   -ZC      Recorded by [Z] Bo Esposito OT Student 02/01/19 1151      Row Name 02/01/19 1050             General ROM    GENERAL ROM COMMENTS  RUE WFL; LUE PP M percautions  (Pended)   -ZC      Recorded by [Z] Bo Esposito OT Student 02/01/19 1151      Row Name 02/01/19 1050 02/01/19 0822          Motor Skills Assessment/Interventions    Additional Documentation  Therapeutic Exercise (Group)  (Pended)   -ZC  Therapeutic Exercise (Group)  -AS      Recorded by [ZC] Bo Esposito OT Student 02/01/19 1151 [AS] MerlineNicole fish, PTA 02/01/19 0947     Row Name 02/01/19 1050 02/01/19 0822          Therapeutic Exercise    70458 - PT Therapeutic Exercise Minutes  --  8  -AS     63892 - OT Therapeutic Exercise Minutes  10  (Pended)   -Z  --     69471 - OT Therapeutic Activity Minutes  12  (Pended)   -Z  --     Recorded by [Z] Bo Esposito OT Student 02/01/19 1151 [AS] Merlinepolina Nicole Flores, PTA 02/01/19 0947     Row Name 02/01/19 1050 02/01/19 0822          Therapeutic Exercise    Upper Extremity (Therapeutic Exercise)  bicep curl, bilateral;tricep extension, bilateral  (Pended)   -Z  --     Upper Extremity Range of Motion (Therapeutic Exercise)  shoulder flexion/extension, right;shoulder abduction/adduction, right;shoulder horizontal abduction/adduction, right;elbow flexion/extension, bilateral;forearm supination/pronation, bilateral  (Pended)   -  --     Lower Extremity Range of Motion (Therapeutic Exercise)  --  hip flexion/extension, bilateral;knee flexion/extension, bilateral;ankle dorsiflexion/plantar flexion, bilateral  -AS     Exercise Type (Therapeutic Exercise)  AROM (active range of motion)  (Pended)   -Z  AROM (active range of motion)  -AS     Position (Therapeutic Exercise)  seated  (Pended)   -  seated  -AS     Sets/Reps (Therapeutic Exercise)  1/12  (Pended)   -  1/10  -AS     Expected Outcome (Therapeutic Exercise)  improve functional tolerance, self-care activity;improve functional tolerance, household activity;improve functional tolerance, single extremity activity;improve object manipulation, home environment;improve object manipulation, self-care activity;improve performance, BADLs;improve performance, fine motor coordination skills  (Pended)   -Z  --     Comment (Therapeutic Exercise)  Rests btwn sets and PRN during sets  (Pended)   -Z  --     Recorded by [ZC] Bo Esposito OT Student 02/01/19 1151 [AS] Fredy  Nicoleselena Flores, [facility] 02/01/19 0947     Row Name 02/01/19 1050             Balance    Balance  static sitting balance;static standing balance;dynamic standing balance;dynamic sitting balance  (Pended)   -ZC      Recorded by [ZC] Vaibhav, Bo HUI OT Student 02/01/19 1151      Row Name 02/01/19 1050             Static Sitting Balance    Level of Hilliards (Unsupported Sitting, Static Balance)  independent  (Pended)   -ZC      Sitting Position (Unsupported Sitting, Static Balance)  sitting on edge of bed;sitting in chair  (Pended)   -ZC      Time Able to Maintain Position (Unsupported Sitting, Static Balance)  more than 5 minutes  (Pended)   -ZC      Recorded by [ZC] Vaibhav, Bo HUI OT Student 02/01/19 1151      Row Name 02/01/19 1050             Dynamic Sitting Balance    Level of Hilliards, Reaches Outside Midline (Sitting, Dynamic Balance)  independent  (Pended)   -ZC      Sitting Position, Reaches Outside Midline (Sitting, Dynamic Balance)  sitting in chair;sitting on edge of bed  (Pended)   -ZC      Comment, Reaches Outside Midline (Sitting, Dynamic Balance)  Grooming; BUE HEP  (Pended)   -ZC      Recorded by [ZC] Vaibhav, Bo HUI OT Student 02/01/19 1151      Row Name 02/01/19 1050             Static Standing Balance    Level of Hilliards (Supported Standing, Static Balance)  supervision  (Pended)   -ZC      Time Able to Maintain Position (Supported Standing, Static Balance)  3 to 4 minutes  (Pended)   -ZC      Comment (Supported Standing, Static Balance)  No AD used  (Pended)   -ZC      Recorded by [ZC] Bo Esposito OT Student 02/01/19 1151      Row Name 02/01/19 1050             Dynamic Standing Balance    Level of Hilliards, Reaches Outside Midline (Standing, Dynamic Balance)  supervision  (Pended)   -ZC      Time Able to Maintain Position, Reaches Outside Midline (Standing, Dynamic Balance)  1 to 2 minutes  (Pended)   -ZC      Comment, Reaches Outside Midline (Standing, Dynamic  Balance)  Side steps to EOB  (Pended)   -ZC      Recorded by [] Bo Esposito OT Student 02/01/19 1151      Row Name 02/01/19 1050 02/01/19 0822          Positioning and Restraints    Pre-Treatment Position  standing in room  (Pended)   -ZC  in bed  -AS     Post Treatment Position  bed  (Pended)   -ZC  chair  -AS     In Bed  supine;call light within reach;encouraged to call for assist;exit alarm on;side rails up x2;LUE elevated;RUE elevated  (Pended)  Pillows behind arms  -ZC  --     In Chair  --  reclined;call light within reach;encouraged to call for assist;waffle cushion;with nsg  -AS     Recorded by [] Bo Esposito OT Student 02/01/19 1151 [AS] Nicole Daniel, PTA 02/01/19 0947     Row Name 02/01/19 1050             Pain Assessment    Additional Documentation  Pain Scale: Numbers Pre/Post-Treatment (Group)  (Pended)   -ZC      Recorded by [] Bo Esposito OT Student 02/01/19 1151      Row Name 02/01/19 1050 02/01/19 0822          Pain Scale: Numbers Pre/Post-Treatment    Pain Scale: Numbers, Pretreatment  0/10 - no pain  (Pended)   -ZC  3/10  -AS     Pain Scale: Numbers, Post-Treatment  0/10 - no pain  (Pended)   -ZC  3/10  -AS     Pain Location - Side  --  Left  -AS     Pain Location  --  -- pacemaker site  -AS     Pain Intervention(s)  --  Repositioned;Ambulation/increased activity  -AS     Recorded by [] Bo Esposito OT Student 02/01/19 1151 [AS] Nicole Daniel, PTA 02/01/19 0947     Row Name 02/01/19 1050             Sensory Assessment/Intervention    Sensory General Assessment  no sensation deficits identified  (Pended)   -ZC      Recorded by [] Bo Esposito OT Student 02/01/19 1151      Row Name                Wound 01/21/19 2000 Left posterior head laceration    Wound - Properties Group Date first assessed: 01/21/19 [PAUL] Time first assessed: 2000 [PAUL] Present On Admission : yes [PAUL] Side: Left [PAUL] Orientation: posterior [PAUL] Location: head [PAUL] Type:  laceration [PAUL] Number of Staples Placed: 6 [KC2] Recorded by:  [PAUL] Cyn Jaquez RN 01/22/19 0432 [KC2] Cyn Jaquez RN 01/22/19 0435    Row Name                Wound 01/22/19 2008 Left elbow abrasion    Wound - Properties Group Date first assessed: 01/22/19 [CC] Time first assessed: 2008 [CC] Present On Admission : yes [CC] Side: Left [CC] Location: elbow [CC] Type: abrasion [CC] Recorded by:  [CC] Erica Lam RN 01/22/19 2332    Row Name                Wound 01/26/19 2000 coccyx other (see comments)    Wound - Properties Group Date first assessed: 01/26/19 [LL] Time first assessed: 2000 [LL] Present On Admission : no [LL] Location: coccyx [LL] Type: other (see comments) [LL], RED PRESSURE AREA  Additional Comments: BILATERAL MEDIAL, UPPER ISCHIAL AND SACRO-COCCYGEAL [LL] Recorded by:  [LL] Tran Page RN 01/27/19 0048    Row Name                Wound 01/31/19 0939 Left upper chest incision;surgical    Wound - Properties Group Date first assessed: 01/31/19 [LLA] Time first assessed: 0939 [LLA] Present On Admission : no [LLA] Side: Left [LLA] Orientation: upper [LLA] Location: chest [LLA] Type: incision;surgical [LLA] Recorded by:  [LLA] Janice Montelongo RN 01/31/19 0940    Row Name 02/01/19 1050             Outcome Summary/Treatment Plan (OT)    Daily Summary of Progress (OT)  progress toward functional goals is good  (Pended)   -ZC      Barriers to Overall Progress (OT)  Balance, strength, endurance, dyspnea, and nausea  (Pended)   -ZC      Plan for Continued Treatment (OT)  Continue POC OT; Next Session bring EC/WS education sheeets.   (Significant)   (Pended)   -ZC      Anticipated Discharge Disposition (OT)  home with assist;home with home health  (Pended)   -ZC      Recorded by [ZC] Bo Esposito OT Student 02/01/19 1151        User Key  (r) = Recorded By, (t) = Taken By, (c) = Cosigned By    Initials Name Effective Dates Discipline    AS Nicole Daniel, PTA 06/22/15 -  PT    SARI  Janice Montelongo RN 06/16/16 -  Nurse    CC Erica Lam RN 06/16/16 -  Nurse    LL Tran Page RN 06/16/16 -  Nurse    Cyn Leo RN 02/22/17 -  Nurse     Vaibhav, Bo HUI OT Student 01/03/19 -  OT        Wound 01/21/19 2000 Left posterior head laceration (Active)   Closure Open to air 2/1/2019  8:00 AM   Periwound edematous;ecchymotic;dry 2/1/2019  8:00 AM   Periwound Temperature warm 2/1/2019  8:00 AM   Periwound Skin Turgor soft 2/1/2019  8:00 AM   Drainage Amount none 2/1/2019  8:00 AM       Wound 01/22/19 2008 Left elbow abrasion (Active)   Closure Open to air 2/1/2019  8:00 AM   Base scab;dry 2/1/2019  8:00 AM   Periwound dry;redness;blanchable 2/1/2019  8:00 AM   Periwound Temperature warm 2/1/2019  8:00 AM   Periwound Skin Turgor soft 2/1/2019  8:00 AM   Edges irregular 2/1/2019  8:00 AM   Drainage Amount none 2/1/2019  8:00 AM       Wound 01/26/19 2000 coccyx other (see comments) (Active)   Dressing Appearance dressing loose 2/1/2019  8:00 AM   Base dry;clean;pink 2/1/2019  8:00 AM   Periwound dry;blanchable;redness 2/1/2019  8:00 AM   Periwound Temperature warm 2/1/2019  8:00 AM   Periwound Skin Turgor soft 2/1/2019  8:00 AM   Drainage Amount none 2/1/2019  8:00 AM   Dressing Care, Wound dressing removed 1/31/2019  8:25 PM       Wound 01/31/19 0939 Left upper chest incision;surgical (Active)   Dressing Appearance dry;intact 2/1/2019  8:00 AM     Rehab Goal Summary     Row Name 02/01/19 1050             Transfer Goal 1 (OT)    Progress/Outcome (Transfer Goal 1, OT)  goal met  (Pended)   -         Bathing Goal 1 (OT)    Progress/Outcomes (Bathing Goal 1, OT)  goal ongoing  (Pended)   -ZC         Dressing Goal 1 (OT)    Progress/Outcome (Dressing Goal 1, OT)  goal ongoing  (Pended)   -ZC         Toileting Goal 1 (OT)    Progress/Outcome (Toileting Goal 1, OT)  goal ongoing  (Pended)   -ZC          Activity Tolerance Goal 1 (OT)    Progress/Outcome (Activity Tolerance Goal 1, OT)  good  progress toward goal  (Pended)  Met, but need for continued goal  -        User Key  (r) = Recorded By, (t) = Taken By, (c) = Cosigned By    Initials Name Provider Type Discipline     Class, Bo HUI OT Student OT Student OT        Occupational Therapy Education     Title: PT OT SLP Therapies (Not Started)     Topic: Occupational Therapy (Done)     Point: ADL training (Done)     Description: Instruct learner(s) on proper safety adaptation and remediation techniques during self care or transfers.   Instruct in proper use of assistive devices.    Learning Progress Summary           Patient Acceptance, E,D, VU by  at 2/1/2019 10:50 AM    Comment:  EC/WS. Safety during ADL dressing to reduce falls. Safety during HEP. HEP technique alterations w/ percautions.    Acceptance, E, VU by TA at 1/29/2019  1:59 PM    Comment:  Safety with ADLs; reinforced need for call for assist with OOB activities.    Acceptance, E,TB,D, VU,DU by KF at 1/28/2019  9:15 AM    Comment:  Safe awareness for LB dressing and incorporation of EC techniques for LB dressing    Acceptance, E, VU,NR by AC at 1/25/2019  2:57 PM    Comment:  adaptive breathing    Acceptance, E,TB,D, DU,VU by KF at 1/23/2019  1:28 PM    Comment:  Purpose of skilled OT services, PLB, EC for ADL completion in toileting, safe seq with RW with cues for environmental scanning                   Point: Home exercise program (Done)     Description: Instruct learner(s) on appropriate technique for monitoring, assisting and/or progressing therapeutic exercises/activities.    Learning Progress Summary           Patient Acceptance, E,D, VU by  at 2/1/2019 10:50 AM    Comment:  EC/WS. Safety during ADL dressing to reduce falls. Safety during HEP. HEP technique alterations w/ percautions.                   Point: Precautions (Done)     Description: Instruct learner(s) on prescribed precautions during self-care and functional transfers.    Learning Progress Summary            Patient Acceptance, E,D, VU by ZC at 2/1/2019 10:50 AM    Comment:  EC/WS. Safety during ADL dressing to reduce falls. Safety during HEP. HEP technique alterations w/ percautions.    Acceptance, E, VU by TA at 1/29/2019  1:59 PM    Comment:  Safety with ADLs; reinforced need for call for assist with OOB activities.    Acceptance, E,TB,D, VU,DU by KF at 1/28/2019  9:15 AM    Comment:  Safe awareness for LB dressing and incorporation of EC techniques for LB dressing    Acceptance, E,TB,D, DU,VU by KF at 1/23/2019  1:28 PM    Comment:  Purpose of skilled OT services, PLB, EC for ADL completion in toileting, safe seq with RW with cues for environmental scanning                   Point: Body mechanics (Done)     Description: Instruct learner(s) on proper positioning and spine alignment during self-care, functional mobility activities and/or exercises.    Learning Progress Summary           Patient Acceptance, E,D, VU by Z at 2/1/2019 10:50 AM    Comment:  EC/WS. Safety during ADL dressing to reduce falls. Safety during HEP. HEP technique alterations w/ percautions.    Acceptance, E, NR by PD at 1/30/2019 12:54 AM    Acceptance, E,TB,D, VU,DU by KF at 1/28/2019  9:15 AM    Comment:  Safe awareness for LB dressing and incorporation of EC techniques for LB dressing    Acceptance, E,TB,D, DU,VU by KF at 1/23/2019  1:28 PM    Comment:  Purpose of skilled OT services, PLB, EC for ADL completion in toileting, safe seq with RW with cues for environmental scanning                               User Key     Initials Effective Dates Name Provider Type Discipline    AC 06/23/15 -  Akilah Quiroga, OT Occupational Therapist OT    TA 03/14/16 -  Kamron Inman, OT Occupational Therapist OT    PD 05/30/17 -  Desmond Garay, RN Registered Nurse Nurse     04/03/18 -  Alona Hernandez, OT Occupational Therapist OT     01/03/19 -  Vaibhav, Bo HUI OT Student OT Student OT                OT Recommendation and Plan  Outcome  Summary/Treatment Plan (OT)  Daily Summary of Progress (OT): (P) progress toward functional goals is good  Barriers to Overall Progress (OT): (P) Balance, strength, endurance, dyspnea, and nausea  Plan for Continued Treatment (OT): (S) (P) Continue POC OT; Next Session bring EC/WS education sheeets.   Anticipated Discharge Disposition (OT): (P) home with assist, home with home health  Daily Summary of Progress (OT): (P) progress toward functional goals is good  Plan of Care Review  Plan of Care Reviewed With: (P) patient  Plan of Care Reviewed With: (P) patient  Outcome Summary: (P) Pt. supervision for transfers, and bed mobility supine-sit. Pt. max for scooting and bed mobility due to LUE percautions from PPM. Pt. set-up for grooming, and supervision set-up for LBD pants/bottoms. Pt. educated on EC/WS, but would benefit from EC/WS educational material next session. Pt. frequent rest breaks, and complains of constant nausea. Pt. limited by strength, endurance, balance, and nausea.  Outcome Measures     Row Name 02/01/19 1050 02/01/19 0822 01/30/19 1137       How much help from another person do you currently need...    Turning from your back to your side while in flat bed without using bedrails?  --  4  -AS  4  -DM    Moving from lying on back to sitting on the side of a flat bed without bedrails?  --  4  -AS  4  -DM    Moving to and from a bed to a chair (including a wheelchair)?  --  3  -AS  3  -DM    Standing up from a chair using your arms (e.g., wheelchair, bedside chair)?  --  3  -AS  4  -DM    Climbing 3-5 steps with a railing?  --  3  -AS  3  -DM    To walk in hospital room?  --  3  -AS  3  -DM    AM-PAC 6 Clicks Score  --  20  -AS  21  -DM       How much help from another is currently needed...    Putting on and taking off regular lower body clothing?  3  (Pended)   -ZC  --  --    Bathing (including washing, rinsing, and drying)  3  (Pended)   -ZC  --  --    Toileting (which includes using toilet bed pan or  urinal)  4  (Pended)   -ZC  --  --    Putting on and taking off regular upper body clothing  4  (Pended)   -ZC  --  --    Taking care of personal grooming (such as brushing teeth)  4  (Pended)   -ZC  --  --    Eating meals  4  (Pended)   -ZC  --  --    Score  22  (Pended)   -ZC  --  --       Functional Assessment    Outcome Measure Options  AM-PAC 6 Clicks Daily Activity (OT)  (Pended)   -Z  AM-PAC 6 Clicks Basic Mobility (PT)  -AS  AM-PAC 6 Clicks Basic Mobility (PT)  -DM    Row Name 01/29/19 1337             How much help from another is currently needed...    Putting on and taking off regular lower body clothing?  3  -TA      Bathing (including washing, rinsing, and drying)  3  -TA      Toileting (which includes using toilet bed pan or urinal)  4  -TA      Putting on and taking off regular upper body clothing  4  -TA      Taking care of personal grooming (such as brushing teeth)  4  -TA      Eating meals  4  -TA      Score  22  -TA         Functional Assessment    Outcome Measure Options  AM-PAC 6 Clicks Daily Activity (OT)  -TA        User Key  (r) = Recorded By, (t) = Taken By, (c) = Cosigned By    Initials Name Provider Type    Carmita Pablo, PT Physical Therapist    AS Nicole Daniel, PTA Physical Therapy Assistant    Kamron Young, OT Occupational Therapist     Vaibhav, Bo HUI, OT Student OT Student           Time Calculation:   Time Calculation- OT     Row Name 02/01/19 1050 02/01/19 0822          Time Calculation- OT    OT Start Time  1050  (Pended)   -  --     OT Stop Time  1125  (Pended)   -  --     OT Time Calculation (min)  35 min  (Pended)   -  --     OT Received On  02/01/19  (Pended)   -  --     OT Goal Re-Cert Due Date  02/02/19  (Pended)   -  --        Timed Charges    60764 - OT Therapeutic Exercise Minutes  10  (Pended)   -Z  --     21575 - Gait Training Minutes   --  15  -AS     61482 - OT Therapeutic Activity Minutes  12  (Pended)   -  --     85373 - OT  Self Care/Mgmt Minutes  13  (Pended)   -  --       User Key  (r) = Recorded By, (t) = Taken By, (c) = Cosigned By    Initials Name Provider Type    AS Nicole Daniel PTA Physical Therapy Assistant     Class, Bo HUI OT Student OT Student           Therapy Suggested Charges     Code   Minutes Charges    30721 (CPT®) Hc Ot Neuromusc Re Education Ea 15 Min      75289 (CPT®) Hc Ot Ther Proc Ea 15 Min 10     79041 (CPT®) Hc Ot Therapeutic Act Ea 15 Min 12 1    68063 (CPT®) Hc Ot Manual Therapy Ea 15 Min      38650 (CPT®) Hc Ot Iontophoresis Ea 15 Min      78035 (CPT®) Hc Ot Elec Stim Ea-Per 15 Min      82568 (CPT®) Hc Ot Ultrasound Ea 15 Min      02910 (CPT®) Hc Ot Self Care/Mgmt/Train Ea 15 Min 13 1    Total  35 2        Therapy Charges for Today     Code Description Service Date Service Provider Modifiers Qty    78389375765 HC OT THERAPEUTIC ACT EA 15 MIN 2/1/2019 Class, Bo HUI OT Student GO 1    39349411507 HC OT SELF CARE/MGMT/TRAIN EA 15 MIN 2/1/2019 Class, Bo HUI OT Student GO 1               Bo Esposito, OT Student  2/1/2019

## 2019-02-01 NOTE — PROGRESS NOTES
Continued Stay Note  [facility]     Patient Name: Jani Pena  MRN: [id number]  Today's Date: 2/1/2019    Admit Date: 1/22/2019    Discharge Plan     Row Name 02/01/19 1507       Plan    Plan  [facility]    Patient/Family in Agreement with Plan  yes    Plan Comments  Per Janice, they can offer Mr. Pena a skilled bed when he is medically ready. She is going to initiate insurance precert today.     Final Discharge Disposition Code  03 - skilled nursing facility (SNF)        Discharge Codes    No documentation.             Bam Zepeda

## 2019-02-01 NOTE — PLAN OF CARE
Problem: Patient Care Overview  Goal: Plan of Care Review  Outcome: Ongoing (interventions implemented as appropriate)    Goal: Individualization and Mutuality  Outcome: Ongoing (interventions implemented as appropriate)    Goal: Discharge Needs Assessment  Outcome: Ongoing (interventions implemented as appropriate)      Problem: Skin Injury Risk (Adult)  Goal: Skin Health and Integrity  Outcome: Ongoing (interventions implemented as appropriate)      Problem: Cardiac: ACS (Acute Coronary Syndrome) (Adult)  Goal: Signs and Symptoms of Listed Potential Problems Will be Absent, Minimized or Managed (Cardiac: ACS)  Outcome: Ongoing (interventions implemented as appropriate)

## 2019-02-02 PROBLEM — Z95.0 CARDIAC PACEMAKER IN SITU: Status: ACTIVE | Noted: 2019-02-02

## 2019-02-02 PROCEDURE — 99232 SBSQ HOSP IP/OBS MODERATE 35: CPT | Performed by: NURSE PRACTITIONER

## 2019-02-02 PROCEDURE — 63710000001 PREDNISONE PER 5 MG: Performed by: PHYSICIAN ASSISTANT

## 2019-02-02 PROCEDURE — 25010000002 HEPARIN (PORCINE) PER 1000 UNITS: Performed by: INTERNAL MEDICINE

## 2019-02-02 PROCEDURE — 94799 UNLISTED PULMONARY SVC/PX: CPT

## 2019-02-02 RX ADMIN — OXYBUTYNIN CHLORIDE 5 MG: 5 TABLET ORAL at 20:52

## 2019-02-02 RX ADMIN — ALBUTEROL SULFATE 2.5 MG: 2.5 SOLUTION RESPIRATORY (INHALATION) at 21:05

## 2019-02-02 RX ADMIN — TOBRAMYCIN 1 DROP: 3 SOLUTION/ DROPS OPHTHALMIC at 09:45

## 2019-02-02 RX ADMIN — SODIUM CHLORIDE, PRESERVATIVE FREE 3 ML: 5 INJECTION INTRAVENOUS at 09:45

## 2019-02-02 RX ADMIN — HEPARIN SODIUM 5000 UNITS: 5000 INJECTION INTRAVENOUS; SUBCUTANEOUS at 20:57

## 2019-02-02 RX ADMIN — MIRTAZAPINE 15 MG: 15 TABLET, FILM COATED ORAL at 20:56

## 2019-02-02 RX ADMIN — ASPIRIN 81 MG: 81 TABLET, COATED ORAL at 09:46

## 2019-02-02 RX ADMIN — ATORVASTATIN CALCIUM 10 MG: 10 TABLET, FILM COATED ORAL at 20:52

## 2019-02-02 RX ADMIN — TAMSULOSIN HYDROCHLORIDE 0.4 MG: 0.4 CAPSULE ORAL at 20:52

## 2019-02-02 RX ADMIN — SODIUM CHLORIDE, PRESERVATIVE FREE 3 ML: 5 INJECTION INTRAVENOUS at 20:51

## 2019-02-02 RX ADMIN — TOBRAMYCIN 1 DROP: 3 SOLUTION/ DROPS OPHTHALMIC at 16:44

## 2019-02-02 RX ADMIN — BACLOFEN 10 MG: 10 TABLET ORAL at 20:52

## 2019-02-02 RX ADMIN — ALPRAZOLAM 0.5 MG: 0.5 TABLET ORAL at 20:52

## 2019-02-02 RX ADMIN — PANTOPRAZOLE SODIUM 40 MG: 40 TABLET, DELAYED RELEASE ORAL at 05:01

## 2019-02-02 RX ADMIN — TOBRAMYCIN 1 DROP: 3 SOLUTION/ DROPS OPHTHALMIC at 05:01

## 2019-02-02 RX ADMIN — DOCUSATE SODIUM 100 MG: 100 CAPSULE, LIQUID FILLED ORAL at 09:46

## 2019-02-02 RX ADMIN — Medication 5 MG: at 20:52

## 2019-02-02 RX ADMIN — METOCLOPRAMIDE 10 MG: 10 TABLET ORAL at 11:57

## 2019-02-02 RX ADMIN — ESCITALOPRAM OXALATE 10 MG: 10 TABLET ORAL at 09:46

## 2019-02-02 RX ADMIN — TOBRAMYCIN 1 DROP: 3 SOLUTION/ DROPS OPHTHALMIC at 11:57

## 2019-02-02 RX ADMIN — DOCUSATE SODIUM 100 MG: 100 CAPSULE, LIQUID FILLED ORAL at 20:52

## 2019-02-02 RX ADMIN — PREDNISONE 10 MG: 10 TABLET ORAL at 09:46

## 2019-02-02 RX ADMIN — SPIRONOLACTONE 25 MG: 25 TABLET ORAL at 09:46

## 2019-02-02 RX ADMIN — METOPROLOL TARTRATE 25 MG: 25 TABLET ORAL at 09:46

## 2019-02-02 RX ADMIN — METOCLOPRAMIDE 10 MG: 10 TABLET ORAL at 05:01

## 2019-02-02 RX ADMIN — METOPROLOL TARTRATE 25 MG: 25 TABLET ORAL at 20:52

## 2019-02-02 RX ADMIN — POLYETHYLENE GLYCOL 3350 17 G: 17 POWDER, FOR SOLUTION ORAL at 09:46

## 2019-02-02 RX ADMIN — TOBRAMYCIN 1 DROP: 3 SOLUTION/ DROPS OPHTHALMIC at 20:57

## 2019-02-02 RX ADMIN — FINASTERIDE 5 MG: 5 TABLET, FILM COATED ORAL at 09:46

## 2019-02-02 RX ADMIN — HEPARIN SODIUM 5000 UNITS: 5000 INJECTION INTRAVENOUS; SUBCUTANEOUS at 15:11

## 2019-02-02 RX ADMIN — CLOPIDOGREL BISULFATE 75 MG: 75 TABLET, FILM COATED ORAL at 09:46

## 2019-02-02 RX ADMIN — METOCLOPRAMIDE 10 MG: 10 TABLET ORAL at 16:44

## 2019-02-02 RX ADMIN — HEPARIN SODIUM 5000 UNITS: 5000 INJECTION INTRAVENOUS; SUBCUTANEOUS at 05:01

## 2019-02-02 NOTE — PROGRESS NOTES
[facility]  Cardiology Progress Note      Chief Complaint/Reason for service:    · Bradycardia with placement of PPM  · Nonsustained ventricular tachycardia       Patient underwent placement of a dual-chamber pacemaker on 1/31/2019.  Patient had sick sinus syndrome and nonsustained VT episodes requiring beta-blockade.  He is currently sitting in bed without complaints.  Denies chest pain or increased dyspnea.  Has spoken with case management and requests discharge to short-term rehabilitation.    Past medical, surgical, social and family history reviewed in the patient's electronic medical record.         Vital Sign Min/Max for last 24 hours  Temp  Min: 97.3 °F (36.3 °C)  Max: 98.4 °F (36.9 °C)   BP  Min: 110/59  Max: 144/80   Pulse  Min: 62  Max: 99   Resp  Min: 16  Max: 18   SpO2  Min: 90 %  Max: 98 %   Flow (L/min)  Min: 1  Max: 2      Intake/Output Summary (Last 24 hours) at 2/2/2019 1315  Last data filed at 2/2/2019 0954  Gross per 24 hour   Intake 360 ml   Output 1075 ml   Net -715 ml           Physical Exam   Constitutional: He is oriented to person, place, and time. He appears well-developed and well-nourished.   HENT:   Head: Normocephalic.   Neck: No JVD present. Carotid bruit is not present.   Cardiovascular: Normal rate, regular rhythm and normal heart sounds. Exam reveals no gallop and no friction rub.   No murmur heard.  paced    Pulmonary/Chest: Effort normal and breath sounds normal.   Abdominal: Soft. Bowel sounds are normal. He exhibits no distension.   Neurological: He is alert and oriented to person, place, and time.   Skin: Skin is warm and dry.   Psychiatric: He has a normal mood and affect.       Tele: Paced    Results Review (reviewed the patient's recent labs in the electronic medical record):           Results from last 7 days   Lab Units 01/31/19  0809 01/28/19  0544 01/27/19  1727   SODIUM mmol/L 135 133 134   POTASSIUM mmol/L 4.0 3.7 4.8   CHLORIDE  mmol/L 105 103 101   BUN mg/dL 50* 48* 56*   CREATININE mg/dL 1.50* 1.62* 1.79*   MAGNESIUM mg/dL  --  2.4 2.5         Results from last 7 days   Lab Units 01/31/19  0809   WBC 10*3/mm3 9.38   HEMOGLOBIN g/dL 11.2*   HEMATOCRIT % 34.7*   PLATELETS 10*3/mm3 269       Lab Results   Component Value Date    HGBA1C 5.4 01/21/2019       Lab Results   Component Value Date    CHOL 188 01/24/2019    CHLPL 134 11/13/2017    TRIG 160 (H) 01/24/2019    HDL 51 01/24/2019     01/24/2019                    Active Hospital Problems    Diagnosis Date Noted   • Cardiac pacemaker in situ [Z95.0] 02/02/2019     Priority: High     · Placement of dual-chamber pacemaker 01/31/2019.  For sick sinus syndrome     • Ventricular tachycardia (CMS/HCC) [I47.2] 01/22/2019     Priority: High   • Syncope and collapse [R55] 01/21/2019     Priority: High   • Atrial fibrillation (CMS/HCC) [I48.91] 08/09/2016     Priority: High   • Chronic kidney disease, stage III (moderate) (CMS/HCC) [N18.3] 08/09/2016     Priority: High   • Esophageal dysphagia [R13.10] 01/22/2019     Priority: Medium     Added automatically from request for surgery [id number]     • Anemia [D64.9] 09/11/2018     Priority: Medium     Added automatically from request for surgery [id number]     • Dysphagia [R13.10] 08/14/2018     Priority: Medium   • Chronic systolic congestive heart failure (CMS/HCC) [I50.22] 03/19/2018     Priority: Medium   • Hypertension [I10] 08/09/2016     Priority: Medium   • Scalp laceration [S01.01XA] 01/22/2019     Priority: Low   • Sleep apnea [G47.30] 08/09/2016     Priority: Low   • N&V (nausea and vomiting) [R11.2] 01/22/2019   • 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

## 2019-02-02 NOTE — PLAN OF CARE
Problem: Patient Care Overview  Goal: Plan of Care Review  Outcome: Ongoing (interventions implemented as appropriate)    Goal: Individualization and Mutuality  Outcome: Ongoing (interventions implemented as appropriate)   02/02/19 1553   Individualization   Patient Specific Preferences patient has had no complaints today. No N&V noted. Awaiting rehab at [facility].when insurance approveds       Problem: Skin Injury Risk (Adult)  Goal: Skin Health and Integrity  Outcome: Ongoing (interventions implemented as appropriate)      Problem: Cardiac: ACS (Acute Coronary Syndrome) (Adult)  Goal: Signs and Symptoms of Listed Potential Problems Will be Absent, Minimized or Managed (Cardiac: ACS)  Outcome: Ongoing (interventions implemented as appropriate)

## 2019-02-02 NOTE — PROGRESS NOTES
[facility] Medicine Services  PROGRESS NOTE    Patient Name: Jani Pena  : 1950  MRN: [id number]    Date of Admission: 2019  Length of Stay: 11  Primary Care Physician: Miguel Rojas MD    Subjective     CC: f/u VTACH, nausea/vomiting     HPI:   Pt is seen resting up in bed in NAD eating breakfast. nausea better and tolerating diet. No acute events overnight per nursing. Plan for rehab when approved      Review of Systems   Gen- No fevers, chills  CV- No chest pain, palpitations  Resp- No cough, dyspnea is stable   GI- (+) nausea at times.  No abd pain,    Otherwise ROS is negative except as mentioned in the HPI.    Objective     Vital Signs:   Temp:  [97.3 °F (36.3 °C)-98.4 °F (36.9 °C)] 97.7 °F (36.5 °C)  Heart Rate:  [62-99] 78  Resp:  [16-18] 18  BP: (110-144)/(59-80) 118/67     Physical Exam:  Constitutional: Appears older than stated age. Chronically-ill appearing. NAD. Awake, alert and conversant. Family at bs.    HENT: NCAT, mucous membranes moist  Respiratory: Clear to auscultation bilaterally but decreased at bases, respiratory effort normal. On 2L NC. Sats 94%  Cardiovascular: RRR, no murmurs, rubs, or gallops, palpable pedal pulses bilaterally  Gastrointestinal: Positive bowel sounds, soft, nontender, nondistended  Musculoskeletal: No bilateral ankle edema.  NGO spontaneously   Psychiatric: Appropriate affect, cooperative and pleasant   Neurologic: Oriented x 3, strength symmetric in all extremities, Cranial Nerves grossly intact to confrontation, speech clear and appropriate.  Follows commands   Skin: No rashes. Lt upper chest wall with drsg in place to PPM site.  Drsg c/d/i    Results Reviewed:  I have personally reviewed current lab, radiology, and data and agree.    Results from last 7 days   Lab Units 19  0809   WBC 10*3/mm3 9.38   HEMOGLOBIN g/dL 11.2*   HEMATOCRIT % 34.7*   PLATELETS 10*3/mm3 269     Results from last 7 days   Lab Units  01/31/19  0809 01/28/19  0544 01/27/19  1727   SODIUM mmol/L 135 133 134   POTASSIUM mmol/L 4.0 3.7 4.8   CHLORIDE mmol/L 105 103 101   CO2 mmol/L 25.0 25.0 26.0   BUN mg/dL 50* 48* 56*   CREATININE mg/dL 1.50* 1.62* 1.79*   GLUCOSE mg/dL 84 82 132*   CALCIUM mg/dL 8.8 9.2 9.3   ALT (SGPT) U/L  --   --  17   AST (SGOT) U/L  --   --  16     Estimated Creatinine Clearance: 49.9 mL/min (A) (by C-G formula based on SCr of 1.5 mg/dL (H)).    No results found for: BNP    Microbiology Results Abnormal     None        Imaging Results (last 24 hours)     Procedure Component Value Units Date/Time    XR Chest PA & Lateral [[id number]] Collected:  02/01/19 1002     Updated:  02/01/19 1324    Narrative:       EXAMINATION: XR CHEST, PA AND LATERAL-02/01/2019:      INDICATION: PACEMAKER.      COMPARISON: 04/13/2017.     FINDINGS: There is a normal cardiac silhouette. There are chronic  pulmonary changes. There is no acute inflammatory process, mass or  effusion. A dual-lead pacemaker has been inserted since the previous  examination and is well positioned. There is no pneumothorax.           Impression:       There has been interval insertion of a dual-lead pacemaker.  The pacemaker is well positioned. There is no pneumothorax.     D:  02/01/2019  E:  02/01/2019     This report was finalized on 2/1/2019 1:22 PM by Dr. Kamron Armas MD.           Results for orders placed during the hospital encounter of 01/20/19   Adult Transthoracic Echo Complete W/ Cont if Necessary Per Protocol    Narrative Technically difficult study   1) Borderline LVH , moderate dilation of LV with mild reduction systolic   function ( EF is about 45%)  2) Moderate left atrial enlargement with normal LVedp  3) Aortic sclerosis with mild AI   4) Mild MR   5) Mild TR with PAsp of 48 mm of hg   6) Mild dilation of the RV with normal RV Function   7) Severe hypokinesis of the inferior and septal walls      I have reviewed the medications:  Scheduled  Meds:    aspirin 81 mg Oral Daily   atorvastatin 10 mg Oral Nightly   budesonide-formoterol 1 puff Inhalation Daily   clopidogrel 75 mg Oral Daily   docusate sodium 100 mg Oral BID   escitalopram 10 mg Oral Daily   finasteride 5 mg Oral Daily   heparin (porcine) 5,000 Units Subcutaneous Q8H   melatonin 5 mg Sublingual Nightly   metoclopramide 10 mg Oral TID AC   metoprolol tartrate 25 mg Oral Q12H   mirtazapine 15 mg Oral Nightly   oxybutynin 5 mg Oral Nightly   pantoprazole 40 mg Oral QAM AC   polyethylene glycol 17 g Oral Daily   predniSONE 10 mg Oral Daily With Breakfast   sodium chloride 3 mL Intravenous Q12H   spironolactone 25 mg Oral Daily   tamsulosin 0.4 mg Oral Nightly   tobramycin 1 drop Both Eyes Q4H     PRN Meds:  •  acetaminophen  •  albuterol  •  ALPRAZolam  •  baclofen  •  fluticasone  •  nitroglycerin  •  ondansetron  •  ondansetron  •  potassium & sodium phosphates **OR** potassium & sodium phosphates  •  sodium chloride  •  traMADol    Assessment / Plan     Active Hospital Problems    Diagnosis Date Noted   • Ventricular tachycardia (CMS/HCC) [I47.2] 01/22/2019   • N&V (nausea and vomiting) [R11.2] 01/22/2019   • Scalp laceration [S01.01XA] 01/22/2019   • Nausea [R11.0] 01/22/2019     Added automatically from request for surgery [id number]     • Esophageal dysphagia [R13.10] 01/22/2019     Added automatically from request for surgery [id number]     • Syncope and collapse [R55] 01/21/2019   • Anemia [D64.9] 09/11/2018     Added automatically from request for surgery [id number]     • Dysphagia [R13.10] 08/14/2018   • Chronic systolic congestive heart failure (CMS/HCC) [I50.22] 03/19/2018   • Atrial fibrillation (CMS/HCC) [I48.91] 08/09/2016   • Chronic kidney disease, stage III (moderate) (CMS/HCC) [N18.3] 08/09/2016   • Sleep apnea [G47.30] 08/09/2016   • Hypertension [I10] 08/09/2016   • 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  male with PMH significant for HTN, hyperlipidemia, chronic respiratory failure on 3-4L NC, COPD on chronic steroid therapy, CKD III (baseline Cr 1.4), chronic systolic CHF and atrial fibrillation (s/p implantable pulse generator 2006). Admitted to [facility] 1/20/19 after syncopal event with head injury/scalp laceration. Transferred to [facility] 1/22/19 for ventricular arrhythmia and elevated troponin.     Scalp laceration  - Staples placed 1/20 at [facility] - removed 1/30  --stable.  No bleeding noted.     Syncope  NSVT   High-grade AV block  - Cardiology following   - Patient needs beta blockade due to his history of NSVT and LV dysfunction.   - Dr. Encinas to place PPM 1/31/19  --no c/o palpitation, cp or soa today 2/1    Non-obstructive CAD   Elevated troponins  - Troponin 0.034 --> 0.057. EKG NSR with frequent PVCs. Cardiology does not plan for further ischemic evaluation   - 5/2018 [facility] with mild diffuse atherosclerosis and no significant flow-limiting CAD   - Continue DAPT/statin/BB     Chronic systolic heart failure  - ECHO 1/2/19 - EF 45%     Mild oral dysphagia  Esophageal dyspnagia  Esophageal dysmotility   - s/p esophageal dilation historically - missed a recent appointment 5 months ago for repeat dilation   - MBS/FEES on 1/13 - mild oral dysphagia. Soft textures, thin liquids   - Needs outpatient esophageal dilation at some point - will need to be off anticoagulation     Persistent nausea/vomiting   Gastroparesis   - Appreciate GI evaluation. S/p EGD on 1/26/19. EGD showed a large amount of undigested food in the stomach with no evidence of gastric outlet obstruction, consistent with gastroparesis  --still intermittent n/v today 2/1/19  - IV Reglan - switched to PO while in-house  - At d/c, stop reglan - Cassandra Root 500mg PO TID before meals   - Boost with meals    Chronic respiratory failure with hypoxia  COPD, steroid-dependent  - At baseline, on prednisone 10mg daily   --continue home oxygen    CKD III, baseline  creatinine 1.4. Improved to 1.50 last ckd 1/31  HTN, BP is controlled  Hyperlipidemia, statin   PEDRO, NIPPV QHS     Weakness/debility  --pt now feels he would best benefit from short term rehab  --CM on board for dc planning    DVT Prophylaxis:  Heparin   Disposition: I expect the patient to be discharged to rehab when insurance approves     CODE STATUS:   Code Status and Medical Interventions:   Ordered at: 01/23/19 1908     Level Of Support Discussed With:    Patient     Code Status:    CPR     Medical Interventions (Level of Support Prior to Arrest):    Full     Electronically signed by PAL Chambers, 02/02/19, 11:23 AM.

## 2019-02-03 PROBLEM — I49.5 SICK SINUS SYNDROME (HCC): Status: ACTIVE | Noted: 2019-02-03

## 2019-02-03 PROBLEM — I50.32 CHRONIC DIASTOLIC CONGESTIVE HEART FAILURE (HCC): Status: ACTIVE | Noted: 2018-03-19

## 2019-02-03 PROBLEM — M54.2 NECK PAIN: Status: RESOLVED | Noted: 2017-04-20 | Resolved: 2019-02-03

## 2019-02-03 PROBLEM — R12 HEARTBURN: Status: RESOLVED | Noted: 2018-09-11 | Resolved: 2019-02-03

## 2019-02-03 PROBLEM — I25.119 CORONARY ARTERY DISEASE INVOLVING NATIVE CORONARY ARTERY OF NATIVE HEART WITH ANGINA PECTORIS (HCC): Status: ACTIVE | Noted: 2018-05-24

## 2019-02-03 PROBLEM — R55 SYNCOPE AND COLLAPSE: Status: RESOLVED | Noted: 2019-01-21 | Resolved: 2019-02-03

## 2019-02-03 PROBLEM — I47.20 VENTRICULAR TACHYCARDIA (HCC): Status: RESOLVED | Noted: 2019-01-22 | Resolved: 2019-02-03

## 2019-02-03 PROBLEM — R53.81 PHYSICAL DECONDITIONING: Status: RESOLVED | Noted: 2017-09-17 | Resolved: 2019-02-03

## 2019-02-03 LAB
ANION GAP SERPL CALCULATED.3IONS-SCNC: 8 MMOL/L (ref 3–11)
BUN BLD-MCNC: 29 MG/DL (ref 9–23)
BUN/CREAT SERPL: 20.4 (ref 7–25)
CALCIUM SPEC-SCNC: 9.1 MG/DL (ref 8.7–10.4)
CHLORIDE SERPL-SCNC: 104 MMOL/L (ref 99–109)
CO2 SERPL-SCNC: 28 MMOL/L (ref 20–31)
CREAT BLD-MCNC: 1.42 MG/DL (ref 0.6–1.3)
GFR SERPL CREATININE-BSD FRML MDRD: 50 ML/MIN/1.73
GLUCOSE BLD-MCNC: 164 MG/DL (ref 70–100)
POTASSIUM BLD-SCNC: 3.7 MMOL/L (ref 3.5–5.5)
SODIUM BLD-SCNC: 140 MMOL/L (ref 132–146)

## 2019-02-03 PROCEDURE — 25010000002 HEPARIN (PORCINE) PER 1000 UNITS: Performed by: INTERNAL MEDICINE

## 2019-02-03 PROCEDURE — 94799 UNLISTED PULMONARY SVC/PX: CPT

## 2019-02-03 PROCEDURE — 94640 AIRWAY INHALATION TREATMENT: CPT

## 2019-02-03 PROCEDURE — 99231 SBSQ HOSP IP/OBS SF/LOW 25: CPT | Performed by: NURSE PRACTITIONER

## 2019-02-03 PROCEDURE — 80048 BASIC METABOLIC PNL TOTAL CA: CPT | Performed by: NURSE PRACTITIONER

## 2019-02-03 PROCEDURE — 63710000001 PREDNISONE PER 5 MG: Performed by: PHYSICIAN ASSISTANT

## 2019-02-03 PROCEDURE — 99233 SBSQ HOSP IP/OBS HIGH 50: CPT | Performed by: NURSE PRACTITIONER

## 2019-02-03 RX ADMIN — ALBUTEROL SULFATE 2.5 MG: 2.5 SOLUTION RESPIRATORY (INHALATION) at 18:56

## 2019-02-03 RX ADMIN — DOCUSATE SODIUM 100 MG: 100 CAPSULE, LIQUID FILLED ORAL at 20:03

## 2019-02-03 RX ADMIN — ACETAMINOPHEN 650 MG: 325 TABLET ORAL at 05:47

## 2019-02-03 RX ADMIN — TOBRAMYCIN 1 DROP: 3 SOLUTION/ DROPS OPHTHALMIC at 20:03

## 2019-02-03 RX ADMIN — FINASTERIDE 5 MG: 5 TABLET, FILM COATED ORAL at 08:49

## 2019-02-03 RX ADMIN — METOCLOPRAMIDE 10 MG: 10 TABLET ORAL at 05:43

## 2019-02-03 RX ADMIN — METOCLOPRAMIDE 10 MG: 10 TABLET ORAL at 10:47

## 2019-02-03 RX ADMIN — CLOPIDOGREL BISULFATE 75 MG: 75 TABLET, FILM COATED ORAL at 08:48

## 2019-02-03 RX ADMIN — ESCITALOPRAM OXALATE 10 MG: 10 TABLET ORAL at 08:48

## 2019-02-03 RX ADMIN — ALPRAZOLAM 0.5 MG: 0.5 TABLET ORAL at 19:23

## 2019-02-03 RX ADMIN — TOBRAMYCIN 1 DROP: 3 SOLUTION/ DROPS OPHTHALMIC at 05:43

## 2019-02-03 RX ADMIN — BUDESONIDE AND FORMOTEROL FUMARATE DIHYDRATE 1 PUFF: 80; 4.5 AEROSOL RESPIRATORY (INHALATION) at 07:18

## 2019-02-03 RX ADMIN — DOCUSATE SODIUM 100 MG: 100 CAPSULE, LIQUID FILLED ORAL at 08:48

## 2019-02-03 RX ADMIN — Medication 5 MG: at 20:03

## 2019-02-03 RX ADMIN — TOBRAMYCIN 1 DROP: 3 SOLUTION/ DROPS OPHTHALMIC at 11:50

## 2019-02-03 RX ADMIN — MIRTAZAPINE 15 MG: 15 TABLET, FILM COATED ORAL at 20:03

## 2019-02-03 RX ADMIN — HEPARIN SODIUM 5000 UNITS: 5000 INJECTION INTRAVENOUS; SUBCUTANEOUS at 14:00

## 2019-02-03 RX ADMIN — METOCLOPRAMIDE 10 MG: 10 TABLET ORAL at 16:24

## 2019-02-03 RX ADMIN — OXYBUTYNIN CHLORIDE 5 MG: 5 TABLET ORAL at 20:03

## 2019-02-03 RX ADMIN — SODIUM CHLORIDE, PRESERVATIVE FREE 10 ML: 5 INJECTION INTRAVENOUS at 21:55

## 2019-02-03 RX ADMIN — METOPROLOL TARTRATE 25 MG: 25 TABLET ORAL at 20:03

## 2019-02-03 RX ADMIN — TOBRAMYCIN 1 DROP: 3 SOLUTION/ DROPS OPHTHALMIC at 08:48

## 2019-02-03 RX ADMIN — METOPROLOL TARTRATE 25 MG: 25 TABLET ORAL at 08:48

## 2019-02-03 RX ADMIN — ALBUTEROL SULFATE 2.5 MG: 2.5 SOLUTION RESPIRATORY (INHALATION) at 23:16

## 2019-02-03 RX ADMIN — HEPARIN SODIUM 5000 UNITS: 5000 INJECTION INTRAVENOUS; SUBCUTANEOUS at 21:55

## 2019-02-03 RX ADMIN — ATORVASTATIN CALCIUM 10 MG: 10 TABLET, FILM COATED ORAL at 20:03

## 2019-02-03 RX ADMIN — PANTOPRAZOLE SODIUM 40 MG: 40 TABLET, DELAYED RELEASE ORAL at 05:43

## 2019-02-03 RX ADMIN — TAMSULOSIN HYDROCHLORIDE 0.4 MG: 0.4 CAPSULE ORAL at 20:03

## 2019-02-03 RX ADMIN — ALBUTEROL SULFATE 2.5 MG: 2.5 SOLUTION RESPIRATORY (INHALATION) at 14:12

## 2019-02-03 RX ADMIN — PREDNISONE 10 MG: 10 TABLET ORAL at 08:48

## 2019-02-03 RX ADMIN — ALBUTEROL SULFATE 2.5 MG: 2.5 SOLUTION RESPIRATORY (INHALATION) at 07:18

## 2019-02-03 RX ADMIN — ASPIRIN 81 MG: 81 TABLET, COATED ORAL at 08:48

## 2019-02-03 RX ADMIN — HEPARIN SODIUM 5000 UNITS: 5000 INJECTION INTRAVENOUS; SUBCUTANEOUS at 05:43

## 2019-02-03 RX ADMIN — SPIRONOLACTONE 25 MG: 25 TABLET ORAL at 08:49

## 2019-02-03 RX ADMIN — POLYETHYLENE GLYCOL 3350 17 G: 17 POWDER, FOR SOLUTION ORAL at 08:48

## 2019-02-03 NOTE — PROGRESS NOTES
[facility] Medicine Services  PROGRESS NOTE    Patient Name: Jani Pena  : 1950  MRN: [id number]    Date of Admission: 2019  Length of Stay: 12  Primary Care Physician: Miguel Rojas MD    Subjective   Subjective     CC:  Follow up VTACH, nausea/vomiting    HPI:  Patient sititng up in bed.  Patient denies any events overnight.  Tolerating food and states nausea has improved.  Reports he feels more short of breath today/wheezing. Currently on 4L NC and sat of 94%.  He admits he has not asked for any nebulizer treatments today.  Reports he has not used symbicort in several days.    Review of Systems  Gen- denies fever/chills  Resp- + SOA, + wheezes, denies any cough  CV- denies any chest pain  GI- +nausea, denies V/D, no abdominal pain    Otherwise ROS is negative except as mentioned in the HPI.    Objective   Objective     Vital Signs:   Temp:  [97.3 °F (36.3 °C)-98.3 °F (36.8 °C)] 97.8 °F (36.6 °C)  Heart Rate:  [] 72  Resp:  [18-20] 18  BP: (121-173)/(49-78) 131/67        Physical Exam:  Constitutional: appears older than stated age.  Chronically ill appearing.  Awake, alert, sitting up in bed  HENT: NCAT, mucous membranes moist  Respiratory: Decreased at bases bilaterally, occasional exp. Wheeze noted, respiratory effort normal, on supplemental O2  Cardiovascular: RRR, no murmurs  Gastrointestinal: Positive bowel sounds, soft, nontender, nondistended  Musculoskeletal: No bilateral ankle edema  Psychiatric: Appropriate affect, cooperative  Neurologic: Oriented x 3, strength symmetric in all extremities, Cranial Nerves grossly intact to confrontation, speech clear  Skin: No rashes noted to exposed areas of skin.  Left upper chst with dressing in place to PPM incision site.  Ecchymosis noted in surrounding tissues, as well as to area of left posterior neck.    Results Reviewed:  I have personally reviewed current lab, radiology, and data and agree.    Results from  last 7 days   Lab Units 01/31/19  0809   WBC 10*3/mm3 9.38   HEMOGLOBIN g/dL 11.2*   HEMATOCRIT % 34.7*   PLATELETS 10*3/mm3 269     Results from last 7 days   Lab Units 02/03/19  0949 01/31/19  0809 01/28/19  0544 01/27/19  1727   SODIUM mmol/L 140 135 133 134   POTASSIUM mmol/L 3.7 4.0 3.7 4.8   CHLORIDE mmol/L 104 105 103 101   CO2 mmol/L 28.0 25.0 25.0 26.0   BUN mg/dL 29* 50* 48* 56*   CREATININE mg/dL 1.42* 1.50* 1.62* 1.79*   GLUCOSE mg/dL 164* 84 82 132*   CALCIUM mg/dL 9.1 8.8 9.2 9.3   ALT (SGPT) U/L  --   --   --  17   AST (SGOT) U/L  --   --   --  16     Estimated Creatinine Clearance: 52.7 mL/min (A) (by C-G formula based on SCr of 1.42 mg/dL (H)).    No results found for: BNP    Microbiology Results Abnormal     None          Imaging Results (last 24 hours)     ** No results found for the last 24 hours. **          Results for orders placed during the hospital encounter of 01/20/19   Adult Transthoracic Echo Complete W/ Cont if Necessary Per Protocol    Narrative Technically difficult study   1) Borderline LVH , moderate dilation of LV with mild reduction systolic   function ( EF is about 45%)  2) Moderate left atrial enlargement with normal LVedp  3) Aortic sclerosis with mild AI   4) Mild MR   5) Mild TR with PAsp of 48 mm of hg   6) Mild dilation of the RV with normal RV Function   7) Severe hypokinesis of the inferior and septal walls        I have reviewed the medications:    Current Facility-Administered Medications:   •  acetaminophen (TYLENOL) tablet 650 mg, 650 mg, Oral, 4x Daily PRN, Mercy Bryant APRN, 650 mg at 02/03/19 0547  •  albuterol (PROVENTIL) nebulizer solution 0.083% 2.5 mg/3mL, 2.5 mg, Nebulization, Q4H PRN, Denita Tavares APRN, 2.5 mg at 02/03/19 1412  •  ALPRAZolam (XANAX) tablet 0.5 mg, 0.5 mg, Oral, Nightly PRN, Denita Tavares APRN, 0.5 mg at 02/02/19 2052  •  aspirin EC tablet 81 mg, 81 mg, Oral, Daily, Denita Tavares APRN, 81 mg at 02/03/19  0848  •  atorvastatin (LIPITOR) tablet 10 mg, 10 mg, Oral, Nightly, Ulysses Bass MD, 10 mg at 02/02/19 2052  •  baclofen (LIORESAL) tablet 10 mg, 10 mg, Oral, TID PRN, Christa Trejo, APRN, 10 mg at 02/02/19 2052  •  budesonide-formoterol (SYMBICORT) 80-4.5 MCG/ACT inhaler 1 puff, 1 puff, Inhalation, Daily, Deloris-Jaime Fergusonice B, APRN, 1 puff at 02/03/19 0718  •  clopidogrel (PLAVIX) tablet 75 mg, 75 mg, Oral, Daily, Deloris-Denita Ferguson, APRN, 75 mg at 02/03/19 0848  •  docusate sodium (COLACE) capsule 100 mg, 100 mg, Oral, BID, Venessa Hilliard PA-C, 100 mg at 02/03/19 0848  •  escitalopram (LEXAPRO) tablet 10 mg, 10 mg, Oral, Daily, Deloris-Jaime Fergusonice B, APRN, 10 mg at 02/03/19 0848  •  finasteride (PROSCAR) tablet 5 mg, 5 mg, Oral, Daily, Jaime Tavaresice B, APRN, 5 mg at 02/03/19 0849  •  fluticasone (FLONASE) 50 MCG/ACT nasal spray 1 spray, 1 spray, Each Nare, Daily PRN, Jaime Tavaresice B, APRN  •  heparin (porcine) 5000 UNIT/ML injection 5,000 Units, 5,000 Units, Subcutaneous, Q8H, Rolf Johnson MD, 5,000 Units at 02/03/19 1400  •  melatonin sublingual tablet 5 mg, 5 mg, Sublingual, Nightly, Venessa Hilliard PA-C, 5 mg at 02/02/19 2052  •  metoclopramide (REGLAN) tablet 10 mg, 10 mg, Oral, TID AC, Fito Street MD, 10 mg at 02/03/19 1047  •  metoprolol tartrate (LOPRESSOR) tablet 25 mg, 25 mg, Oral, Q12H, Denita Tavares B, APRN, 25 mg at 02/03/19 0848  •  mirtazapine (REMERON) tablet 15 mg, 15 mg, Oral, Nightly, Venessa Hilliard PA-C, 15 mg at 02/02/19 2056  •  nitroglycerin (NITROSTAT) SL tablet 0.4 mg, 0.4 mg, Sublingual, Q5 Min PRN, Denita Tavares APRN  •  ondansetron (ZOFRAN) injection 4 mg, 4 mg, Intravenous, Q6H PRN, Shawanda Swain PA 4 mg at 02/01/19 0911  •  ondansetron (ZOFRAN) tablet 4 mg, 4 mg, Oral, Q6H PRN, Venessa Hilliard PA-C, 4 mg at 01/31/19 1440  •  oxybutynin (DITROPAN) tablet 5 mg, 5 mg, Oral, Nightly, Chaitanya,  Denita B, APRN, 5 mg at 02/02/19 2052  •  pantoprazole (PROTONIX) EC tablet 40 mg, 40 mg, Oral, QA AC, Fito Street MD, 40 mg at 02/03/19 0543  •  polyethylene glycol 3350 powder (packet), 17 g, Oral, Daily, Venessa Hilliard PA-C, 17 g at 02/03/19 0848  •  potassium & sodium phosphates (PHOS-NAK) 280-160-250 MG packet - for Phosphorus less than 1.25 mg/dL, 2 packet, Oral, Q6H PRN **OR** potassium & sodium phosphates (PHOS-NAK) 280-160-250 MG packet - for Phosphorus 1.25 - 2.5 mg/dL, 2 packet, Oral, Once PRN, Tej Webb MD, 2 packet at 01/28/19 1615  •  predniSONE (DELTASONE) tablet 10 mg, 10 mg, Oral, Daily With Breakfast, Venessa Hilliard PA-C, 10 mg at 02/03/19 0848  •  sodium chloride 0.9 % flush 3 mL, 3 mL, Intravenous, Q12H, Huang, Omar, DO, 3 mL at 02/02/19 2051  •  sodium chloride 0.9 % flush 3-10 mL, 3-10 mL, Intravenous, PRN, Huang, Omar, DO  •  spironolactone (ALDACTONE) tablet 25 mg, 25 mg, Oral, Daily, Deloris-Jaime Fergusonice B, APRN, 25 mg at 02/03/19 0849  •  tamsulosin (FLOMAX) 24 hr capsule 0.4 mg, 0.4 mg, Oral, Nightly, Jaime Tavaresice B, APRN, 0.4 mg at 02/02/19 2052  •  tobramycin 0.3 % ophthalmic solution 1 drop, 1 drop, Both Eyes, Q4H, Rolf Johnson MD, 1 drop at 02/03/19 1150  •  traMADol (ULTRAM) tablet 50 mg, 50 mg, Oral, Q8H PRN, Jaime Tavaresice B, APRN, 50 mg at 02/01/19 2042      Assessment/Plan   Assessment / Plan     Active Hospital Problems    Diagnosis Date Noted   • **Sick sinus syndrome (CMS/HCC) [I49.5] 02/03/2019     · Dual-chamber pacemaker (SJM)  for sick sinus syndrome by Omar Encinas, 1/31/2019     • N&V (nausea and vomiting) [R11.2] 01/22/2019   • Scalp laceration [S01.01XA] 01/22/2019   • Esophageal dysphagia [R13.10] 01/22/2019     Added automatically from request for surgery [id number]     • Anemia [D64.9] 09/11/2018     Added automatically from request for surgery [id number]     • Dysphagia [R13.10] 08/14/2018   • Chronic diastolic  congestive heart failure (CMS/HCC) [I50.32] 03/19/2018     · Echo @ [facility] (1/21/19): LVEF 45%. Moderate LA enlargement. Mild aortic insufficiency. Hypokinesis of the inferior and septal walls     • Atrial fibrillation (CMS/HCC) [I48.91] 08/09/2016     · Chads VASC = 3 (age, CAD, HTN)     • Chronic kidney disease, stage III (moderate) (CMS/HCC) [N18.3] 08/09/2016   • Sleep apnea [G47.30] 08/09/2016   • Essential hypertension [I10] 08/09/2016   • Steroid-dependent COPD (CMS/[facility]) [J44.9] 08/09/2016   • Type 2 diabetes mellitus with stage 3 chronic kidney disease (CMS/[facility]) [E11.22, N18.3] 08/09/2016          Brief Hospital Course to date:  Jani Pena is a 68 y.o. male with PMH significant for HTN, HLD, chronic respiratory failure on 3-4L NC, COPD on chronic steroid therapy, CKD III (baseline Cr 1.4), chronic systolic CHF and atrial fibrillation (s/p implantable pulse generator 2006).  Admitted to [facility] 1/20/19 after syncopal event with head injury/scalp lacerations.  Transferred to [facility] 1/22/19 for ventricular arrhythmia and elevated troponin.      Scalp laceration  - staples 1/20 at [facility], removed 1/30  - stable, no active bleed    Syncope  NSVT  High-grade AV block  - cardiology following  -Patient needs beta blockade due to history of NSVT and LV dysfunction  - PPM placed per Dr. Encinas    Non-obstructive CAD  Elevated troponins  - Troponin 0.034 >0.057.  EKG NSR with freq pvcs.  Cardiology does not plan further ischemic evaluation  -[facility] 5/2018 with mild diffuse atherosclerosis, no significant flow-limiting CAD  - Continue DAPT/statin/BB    Chronic systolic heart failure  -echo 1/2/19 EF 45%    Mild oral dysphagia  Esophageal dysphagia/dysmotility  - /p esophageal dilation historically - missed a recent appointment 5 months ago for repeat dilation   - MBS/FEES on 1/13 - mild oral dysphagia. Soft textures, thin liquids   - Needs outpatient esophageal dilation at some point - will need to be off anticoagulation       Persistent nausea/vomiting   Gastroparesis   - Appreciate GI evaluation. S/p EGD on 1/26/19. EGD showed a large amount of undigested food in the stomach with no evidence of gastric outlet obstruction, consistent with gastroparesis  --still intermittent n/v today 2/1/19  - IV Reglan - switched to PO while in-house  - At d/c, stop reglan - Cassandra Root 500mg PO TID before meals   - Boost with meals     Chronic respiratory failure with hypoxia  COPD, steroid-dependent  - on prednisone 10mg daily   --continue supplemental oxygen  - appears symbicort was given this am, discussed with patient that he must ask for nebulizer treatments.  Discussed with nursing staff to clarify patient is getting these therapies.   - monitor, sat 94-97% at this time.      CKD III, baseline creatinine 1.4. Improved to 1.50 last ckd 1/31  HTN, BP is controlled  Hyperlipidemia, statin   PEDRO, NIPPV QHS       DVT Prophylaxis:  Heparin    Disposition: I expect the patient to be discharged to rehab when arranged.   CODE STATUS:   Code Status and Medical Interventions:   Ordered at: 01/23/19 1901     Level Of Support Discussed With:    Patient     Code Status:    CPR     Medical Interventions (Level of Support Prior to Arrest):    Full         Electronically signed by PAL Juarez, 02/03/19, 2:48 PM.

## 2019-02-03 NOTE — PLAN OF CARE
Problem: Patient Care Overview  Goal: Plan of Care Review  Outcome: Ongoing (interventions implemented as appropriate)   02/03/19 0115   Coping/Psychosocial   Plan of Care Reviewed With patient   Plan of Care Review   Progress improving   OTHER   Outcome Summary PT rested well. VSS. No C/O pain. Hopes to go to rehab soon.      Goal: Individualization and Mutuality  Outcome: Ongoing (interventions implemented as appropriate)    Goal: Discharge Needs Assessment  Outcome: Ongoing (interventions implemented as appropriate)    Goal: Interprofessional Rounds/Family Conf  Outcome: Ongoing (interventions implemented as appropriate)      Problem: Fall Risk (Adult)  Goal: Identify Related Risk Factors and Signs and Symptoms  Outcome: Ongoing (interventions implemented as appropriate)    Goal: Absence of Fall  Outcome: Ongoing (interventions implemented as appropriate)      Problem: Skin Injury Risk (Adult)  Goal: Skin Health and Integrity  Outcome: Ongoing (interventions implemented as appropriate)      Problem: Cardiac: ACS (Acute Coronary Syndrome) (Adult)  Goal: Signs and Symptoms of Listed Potential Problems Will be Absent, Minimized or Managed (Cardiac: ACS)  Outcome: Ongoing (interventions implemented as appropriate)

## 2019-02-03 NOTE — PLAN OF CARE
Problem: Patient Care Overview  Goal: Plan of Care Review  Outcome: Ongoing (interventions implemented as appropriate)   02/03/19 6148   Coping/Psychosocial   Plan of Care Reviewed With patient   Plan of Care Review   Progress no change       Problem: Fall Risk (Adult)  Goal: Identify Related Risk Factors and Signs and Symptoms  Outcome: Ongoing (interventions implemented as appropriate)      Problem: Cardiac: ACS (Acute Coronary Syndrome) (Adult)  Goal: Signs and Symptoms of Listed Potential Problems Will be Absent, Minimized or Managed (Cardiac: ACS)  Outcome: Ongoing (interventions implemented as appropriate)

## 2019-02-03 NOTE — PROGRESS NOTES
[facility]  Cardiology Progress Note      Chief Complaint/Reason for service:    · Bradycardia with placement of PPM  · Nonsustained ventricular tachycardia          [facility]  Sitting up in chair without complaints.  No events overnight.    Past medical, surgical, social and family history reviewed in the patient's electronic medical record.         Vital Sign Min/Max for last 24 hours  Temp  Min: 97.3 °F (36.3 °C)  Max: 98.3 °F (36.8 °C)   BP  Min: 118/67  Max: 173/78   Pulse  Min: 60  Max: 105   Resp  Min: 18  Max: 20   SpO2  Min: 90 %  Max: 97 %   Flow (L/min)  Min: 1  Max: 2.5      Intake/Output Summary (Last 24 hours) at 2/3/2019 0979  Last data filed at 2/3/2019 0714  Gross per 24 hour   Intake 400 ml   Output 2175 ml   Net -1775 ml           Physical Exam   Constitutional: He is oriented to person, place, and time. He appears well-developed and well-nourished.   HENT:   Head: Normocephalic.   Neck: No JVD present. Carotid bruit is not present.   Cardiovascular: Normal rate, regular rhythm and normal heart sounds. Exam reveals no gallop and no friction rub.   No murmur heard.  paced    Pulmonary/Chest: Effort normal and breath sounds normal.   Abdominal: Soft. Bowel sounds are normal. He exhibits no distension.   Neurological: He is alert and oriented to person, place, and time.   Skin: Skin is warm and dry.   Psychiatric: He has a normal mood and affect.       Tele: Paced    Results Review (reviewed the patient's recent labs in the electronic medical record):           Results from last 7 days   Lab Units 01/31/19  0809 01/28/19  0544 01/27/19  1727   SODIUM mmol/L 135 133 134   POTASSIUM mmol/L 4.0 3.7 4.8   CHLORIDE mmol/L 105 103 101   BUN mg/dL 50* 48* 56*   CREATININE mg/dL 1.50* 1.62* 1.79*   MAGNESIUM mg/dL  --  2.4 2.5         Results from last 7 days   Lab Units 01/31/19  0809   WBC 10*3/mm3 9.38   HEMOGLOBIN g/dL 11.2*   HEMATOCRIT %  34.7*   PLATELETS 10*3/mm3 269       Lab Results   Component Value Date    HGBA1C 5.4 01/21/2019       Lab Results   Component Value Date    CHOL 188 01/24/2019    CHLPL 134 11/13/2017    TRIG 160 (H) 01/24/2019    HDL 51 01/24/2019     01/24/2019                    Active Hospital Problems    Diagnosis Date Noted   • Cardiac pacemaker in situ [Z95.0] 02/02/2019     Priority: High     · Placement of dual-chamber pacemaker 01/31/2019.  For sick sinus syndrome     • Ventricular tachycardia (CMS/HCC) [I47.2] 01/22/2019     Priority: High   • Syncope and collapse [R55] 01/21/2019     Priority: High   • Atrial fibrillation (CMS/HCC) [I48.91] 08/09/2016     Priority: High   • Chronic kidney disease, stage III (moderate) (CMS/HCC) [N18.3] 08/09/2016     Priority: High   • Esophageal dysphagia [R13.10] 01/22/2019     Priority: Medium     Added automatically from request for surgery [id number]     • Anemia [D64.9] 09/11/2018     Priority: Medium     Added automatically from request for surgery [id number]     • Dysphagia [R13.10] 08/14/2018     Priority: Medium   • Chronic systolic congestive heart failure (CMS/HCC) [I50.22] 03/19/2018     Priority: Medium   • Hypertension [I10] 08/09/2016     Priority: Medium   • Scalp laceration [S01.01XA] 01/22/2019     Priority: Low   • Sleep apnea [G47.30] 08/09/2016     Priority: Low   • N&V (nausea and vomiting) [R11.2] 01/22/2019   • Steroid-dependent COPD (CMS/HCC) [J44.9] 08/09/2016              · Ready for discharge once rehab has bene arranged  · Dr Encinas and Kali to assume care in the am    PAL Campbell  2/3/2019

## 2019-02-04 LAB
ANION GAP SERPL CALCULATED.3IONS-SCNC: 5 MMOL/L (ref 3–11)
BUN BLD-MCNC: 25 MG/DL (ref 9–23)
BUN/CREAT SERPL: 19.2 (ref 7–25)
CALCIUM SPEC-SCNC: 8.5 MG/DL (ref 8.7–10.4)
CHLORIDE SERPL-SCNC: 106 MMOL/L (ref 99–109)
CO2 SERPL-SCNC: 29 MMOL/L (ref 20–31)
CREAT BLD-MCNC: 1.3 MG/DL (ref 0.6–1.3)
GFR SERPL CREATININE-BSD FRML MDRD: 55 ML/MIN/1.73
GLUCOSE BLD-MCNC: 99 MG/DL (ref 70–100)
POTASSIUM BLD-SCNC: 4 MMOL/L (ref 3.5–5.5)
SODIUM BLD-SCNC: 140 MMOL/L (ref 132–146)

## 2019-02-04 PROCEDURE — 94640 AIRWAY INHALATION TREATMENT: CPT

## 2019-02-04 PROCEDURE — 94799 UNLISTED PULMONARY SVC/PX: CPT

## 2019-02-04 PROCEDURE — 99232 SBSQ HOSP IP/OBS MODERATE 35: CPT | Performed by: NURSE PRACTITIONER

## 2019-02-04 PROCEDURE — 97116 GAIT TRAINING THERAPY: CPT

## 2019-02-04 PROCEDURE — 63710000001 PREDNISONE PER 5 MG: Performed by: PHYSICIAN ASSISTANT

## 2019-02-04 PROCEDURE — 80048 BASIC METABOLIC PNL TOTAL CA: CPT | Performed by: NURSE PRACTITIONER

## 2019-02-04 PROCEDURE — 94760 N-INVAS EAR/PLS OXIMETRY 1: CPT

## 2019-02-04 PROCEDURE — 97110 THERAPEUTIC EXERCISES: CPT

## 2019-02-04 PROCEDURE — 97530 THERAPEUTIC ACTIVITIES: CPT

## 2019-02-04 PROCEDURE — 25010000002 HEPARIN (PORCINE) PER 1000 UNITS: Performed by: INTERNAL MEDICINE

## 2019-02-04 RX ORDER — BUDESONIDE AND FORMOTEROL FUMARATE DIHYDRATE 80; 4.5 UG/1; UG/1
1 AEROSOL RESPIRATORY (INHALATION)
Status: DISCONTINUED | OUTPATIENT
Start: 2019-02-04 | End: 2019-02-05 | Stop reason: HOSPADM

## 2019-02-04 RX ADMIN — METOCLOPRAMIDE 10 MG: 10 TABLET ORAL at 05:00

## 2019-02-04 RX ADMIN — ATORVASTATIN CALCIUM 10 MG: 10 TABLET, FILM COATED ORAL at 20:01

## 2019-02-04 RX ADMIN — DOCUSATE SODIUM 100 MG: 100 CAPSULE, LIQUID FILLED ORAL at 08:52

## 2019-02-04 RX ADMIN — PANTOPRAZOLE SODIUM 40 MG: 40 TABLET, DELAYED RELEASE ORAL at 05:00

## 2019-02-04 RX ADMIN — METOPROLOL TARTRATE 25 MG: 25 TABLET ORAL at 20:01

## 2019-02-04 RX ADMIN — FINASTERIDE 5 MG: 5 TABLET, FILM COATED ORAL at 08:52

## 2019-02-04 RX ADMIN — MIRTAZAPINE 15 MG: 15 TABLET, FILM COATED ORAL at 20:01

## 2019-02-04 RX ADMIN — ALBUTEROL SULFATE 2.5 MG: 2.5 SOLUTION RESPIRATORY (INHALATION) at 20:41

## 2019-02-04 RX ADMIN — BUDESONIDE AND FORMOTEROL FUMARATE DIHYDRATE 1 PUFF: 80; 4.5 AEROSOL RESPIRATORY (INHALATION) at 14:17

## 2019-02-04 RX ADMIN — METOCLOPRAMIDE 10 MG: 10 TABLET ORAL at 11:47

## 2019-02-04 RX ADMIN — METOPROLOL TARTRATE 25 MG: 25 TABLET ORAL at 08:52

## 2019-02-04 RX ADMIN — Medication 5 MG: at 20:01

## 2019-02-04 RX ADMIN — HEPARIN SODIUM 5000 UNITS: 5000 INJECTION INTRAVENOUS; SUBCUTANEOUS at 14:32

## 2019-02-04 RX ADMIN — TAMSULOSIN HYDROCHLORIDE 0.4 MG: 0.4 CAPSULE ORAL at 20:01

## 2019-02-04 RX ADMIN — ACETAMINOPHEN 650 MG: 325 TABLET ORAL at 13:09

## 2019-02-04 RX ADMIN — TOBRAMYCIN 1 DROP: 3 SOLUTION/ DROPS OPHTHALMIC at 16:50

## 2019-02-04 RX ADMIN — ESCITALOPRAM OXALATE 10 MG: 10 TABLET ORAL at 08:52

## 2019-02-04 RX ADMIN — HEPARIN SODIUM 5000 UNITS: 5000 INJECTION INTRAVENOUS; SUBCUTANEOUS at 21:49

## 2019-02-04 RX ADMIN — ALPRAZOLAM 0.5 MG: 0.5 TABLET ORAL at 20:01

## 2019-02-04 RX ADMIN — SPIRONOLACTONE 25 MG: 25 TABLET ORAL at 08:52

## 2019-02-04 RX ADMIN — PREDNISONE 10 MG: 10 TABLET ORAL at 08:52

## 2019-02-04 RX ADMIN — DOCUSATE SODIUM 100 MG: 100 CAPSULE, LIQUID FILLED ORAL at 20:01

## 2019-02-04 RX ADMIN — ALBUTEROL SULFATE 2.5 MG: 2.5 SOLUTION RESPIRATORY (INHALATION) at 11:59

## 2019-02-04 RX ADMIN — TRAMADOL HYDROCHLORIDE 50 MG: 50 TABLET, FILM COATED ORAL at 16:57

## 2019-02-04 RX ADMIN — CLOPIDOGREL BISULFATE 75 MG: 75 TABLET, FILM COATED ORAL at 08:52

## 2019-02-04 RX ADMIN — POLYETHYLENE GLYCOL 3350 17 G: 17 POWDER, FOR SOLUTION ORAL at 08:51

## 2019-02-04 RX ADMIN — ASPIRIN 81 MG: 81 TABLET, COATED ORAL at 08:52

## 2019-02-04 RX ADMIN — TOBRAMYCIN 1 DROP: 3 SOLUTION/ DROPS OPHTHALMIC at 11:47

## 2019-02-04 RX ADMIN — METOCLOPRAMIDE 10 MG: 10 TABLET ORAL at 16:50

## 2019-02-04 RX ADMIN — OXYBUTYNIN CHLORIDE 5 MG: 5 TABLET ORAL at 20:01

## 2019-02-04 RX ADMIN — ALBUTEROL SULFATE 2.5 MG: 2.5 SOLUTION RESPIRATORY (INHALATION) at 14:17

## 2019-02-04 RX ADMIN — HEPARIN SODIUM 5000 UNITS: 5000 INJECTION INTRAVENOUS; SUBCUTANEOUS at 05:00

## 2019-02-04 RX ADMIN — TOBRAMYCIN 1 DROP: 3 SOLUTION/ DROPS OPHTHALMIC at 20:02

## 2019-02-04 RX ADMIN — TOBRAMYCIN 1 DROP: 3 SOLUTION/ DROPS OPHTHALMIC at 04:53

## 2019-02-04 RX ADMIN — SODIUM CHLORIDE, PRESERVATIVE FREE 3 ML: 5 INJECTION INTRAVENOUS at 08:52

## 2019-02-04 RX ADMIN — TOBRAMYCIN 1 DROP: 3 SOLUTION/ DROPS OPHTHALMIC at 08:51

## 2019-02-04 RX ADMIN — BUDESONIDE AND FORMOTEROL FUMARATE DIHYDRATE 1 PUFF: 80; 4.5 AEROSOL RESPIRATORY (INHALATION) at 20:41

## 2019-02-04 NOTE — THERAPY PROGRESS REPORT/RE-CERT
Acute Care - Occupational Therapy Progress Note  [facility]     Patient Name: Jani Pena  : 1950  MRN: [id number]  Today's Date: 2019  Onset of Illness/Injury or Date of Surgery: 19  Date of Referral to OT: 19  Referring Physician: PAL Trejo    Admit Date: 2019       ICD-10-CM ICD-9-CM   1. Impaired functional mobility, balance, gait, and endurance Z74.09 V49.89   2. Oral phase dysphagia R13.11 787.21   3. Impaired mobility and ADLs Z74.09 799.89   4. Persistent atrial fibrillation (CMS/HCC) I48.1 427.31   5. Chronic systolic congestive heart failure (CMS/HCC) I50.22 428.22     428.0   6. Ventricular tachycardia (CMS/HCC) I47.2 427.1   7. Type 2 diabetes mellitus with complication, without long-term current use of insulin (CMS/[facility]) E11.8 250.90   8. Physical deconditioning R53.81 799.3   9. Nausea R11.0 787.02   10. Esophageal dysphagia R13.10 787.20     Patient Active Problem List   Diagnosis   • Anxiety   • Atrial fibrillation (CMS/HCC)   • Chronic kidney disease, stage III (moderate) (CMS/HCC)   • Steroid-dependent COPD (CMS/HCC)   • Degeneration of cervical intervertebral disc   • Type 2 diabetes mellitus with stage 3 chronic kidney disease (CMS/HCC)   • GERD without esophagitis   • Diverticulosis   • Hemorrhoids   • Hiatal hernia   • Hyperlipidemia   • Essential hypertension   • Kyphosis, acquired   • Sleep apnea   • Enlarged prostate without lower urinary tract symptoms (luts)   • History of arthritis   • Back pain   • Depression   • History of ventricular septal defect   • Impaired mobility and ADLs   • Chronic diastolic congestive heart failure (CMS/HCC)   • Coronary artery disease involving native coronary artery of native heart with angina pectoris (CMS/HCC)   • Lumbar radiculopathy   • Dysphagia   • Anemia   • N&V (nausea and vomiting)   • Scalp laceration   • Esophageal dysphagia   • Cardiac pacemaker in situ   • Sick sinus syndrome (CMS/HCC)     Past Medical  History:   Diagnosis Date   • Abnormal liver enzymes    • Anemia    • Anxiety    • Arthritis    • Atherosclerotic heart disease of native coronary artery without angina pectoris    • Atrial fibrillation (CMS/HCC)    • Back pain    • BPH (benign prostatic hyperplasia)    • Cataract, bilateral    • Chest pain     2-3 MONTHS AGO.  PER PT, HAS ADDRESSED WITH CARDIOLOGIST.  STATES HAS NTG PRN.   • CHF (congestive heart failure) (CMS/HCC)    • Chronic bronchitis (CMS/HCC)    • Chronic kidney disease    • COPD (chronic obstructive pulmonary disease) (CMS/HCC)    • Degeneration of cervical intervertebral disc    • Depression    • Diverticulosis    • Dyspnea    • Esophageal reflux    • Esophagitis    • Gastritis    • Hematuria    • Hemorrhoids    • Hiatal hernia    • History of arthritis    • History of nuclear stress test     UNSURE OF DATE   • History of peripheral neuropathy    • History of ventricular septal defect    • History of ventricular septal defect    • Hyperlipidemia    • Hypertension    • Infectious diarrhea    • Kyphosis, acquired    • Lumbar radiculopathy    • Mitral and aortic valve disease    • Nephrolithiasis    • Phimosis    • Problems with swallowing     WITH FOOD   • Respiratory failure (CMS/HCC)    • Sleep apnea     BIPAP   • TIA (transient ischemic attack)     X3.   2014   • Ventral hernia    • Wears glasses     FOR READING     Past Surgical History:   Procedure Laterality Date   • CARDIAC CATHETERIZATION     • CARDIAC CATHETERIZATION N/A 5/24/2018    Procedure: Left Heart Cath;  Surgeon: Edouard Robertson MD;  Location:  [facility];  Service: Cardiovascular   • CARDIAC ELECTROPHYSIOLOGY PROCEDURE N/A 1/31/2019    Procedure: PACEMAKER IMPLANTATION- DC;  Surgeon: Omar Encinas DO;  Location:  [facility];  Service: Cardiology   • ENDOSCOPY N/A 1/26/2019    Procedure: ESOPHAGOGASTRODUODENOSCOPY;  Surgeon: Brunner, Mark I, MD;  Location:  [facility];  Service:  Gastroenterology   • HERNIA REPAIR     • ORTHOPEDIC SURGERY Left     Left hip arthroplasty   • SUPRAPUBIC CATHETER INSERTION      History of Percutaneous Catheter Placement Into Ureter   • THROAT SURGERY      FOR SLEEP APNEA   • VSD REPAIR      History of VSD Repair By Patch Closure       Therapy Treatment    Rehabilitation Treatment Summary     Row Name 02/04/19 0922             Treatment Time/Intention    Discipline  occupational therapist  -KF      Document Type  therapy note (daily note)  -KF      Subjective Information  complains of;fatigue;dyspnea  -KF      Mode of Treatment  individual therapy;occupational therapy  -KF      Patient/Family Observations  no family present, Pt supine IV heplocked, NC throughout   -KF      Care Plan Review  evaluation/treatment results reviewed;care plan/treatment goals reviewed;risks/benefits reviewed;current/potential barriers reviewed;patient/other agree to care plan  -KF      Patient Effort  excellent  -KF      Comment  provided EC handout for education   -KF      Existing Precautions/Restrictions  fall;oxygen therapy device and L/min;pacemaker  -KF      Treatment Considerations/Comments  PPM throughout   -KF      Patient Response to Treatment  tolerated  -KF      Recorded by [KF] Alona Hernandez OT 02/04/19 1002      Row Name 02/04/19 0922             Vital Signs    Pre Systolic BP Rehab  158  -KF      Pre Treatment Diastolic BP  77 RN cleared vitals stable  -KF      Pretreatment Heart Rate (beats/min)  81  -KF      Pre SpO2 (%)  95  -KF      O2 Delivery Pre Treatment  nasal cannula  -KF      Intra SpO2 (%)  87  -KF      O2 Delivery Intra Treatment  supplemental O2  -KF      Post SpO2 (%)  95  -KF      O2 Delivery Post Treatment  supplemental O2  -KF      Pre Patient Position  Supine  -KF      Intra Patient Position  Standing  -KF      Post Patient Position  Supine  -KF      Recovery Time  less than 30 seconds  -KF      Rest Breaks   2  -KF      Recorded by [KF] Mary  Alona AGUILAR, OT 02/04/19 1002      Row Name 02/04/19 0922             Cognitive Assessment/Intervention    Additional Documentation  Cognitive Assessment/Intervention (Group)  -KF      Recorded by [KF] Alona Hernandez, OT 02/04/19 1002      Row Name 02/04/19 0922             Cognitive Assessment/Intervention- PT/OT    Affect/Mental Status (Cognitive)  WFL  -KF      Orientation Status (Cognition)  oriented x 4  -KF      Follows Commands (Cognition)  WFL;follows one step commands;over 90% accuracy;verbal cues/prompting required;repetition of directions required  -KF      Cognitive Function (Cognitive)  safety deficit  -KF      Safety Deficit (Cognitive)  mild deficit;safety precautions awareness  -KF      Cognitive Interventions (Cognitive)  occupation/activity based interventions  -KF      Personal Safety Interventions  fall prevention program maintained;gait belt;muscle strengthening facilitated;nonskid shoes/slippers when out of bed  -KF      Recorded by [KF] Alona Hernandez, OT 02/04/19 1002      Row Name 02/04/19 0922             Safety Issues, Functional Mobility    Safety Issues Affecting Function (Mobility)  awareness of need for assistance;insight into deficits/self awareness  -KF      Impairments Affecting Function (Mobility)  balance;endurance/activity tolerance  -KF      Comment, Safety Issues/Impairments (Mobility)  anxious behaviors at times, cued for pacing   -KF      Recorded by [KF] Alona Hernandez, OT 02/04/19 1002      Row Name 02/04/19 0922             Bed Mobility Assessment/Treatment    Bed Mobility Assessment/Treatment  supine-sit;sit-supine;scooting/bridging  -KF      Scooting/Bridging [place] (Bed Mobility)  supervision;verbal cues  -KF      Supine-Sit [place] (Bed Mobility)  supervision;verbal cues  -KF      Sit-Supine [place] (Bed Mobility)  supervision;verbal cues  -KF      Bed Mobility, Safety Issues  decreased use of arms for pushing/pulling  -KF      Assistive  Device (Bed Mobility)  head of bed elevated  -KF      Comment (Bed Mobility)  cues and supervision for PPM precautions, maintained well, no assist able to use R UE and not pull with L UE  -KF      Recorded by [KF] Alona Hernandez, OT 02/04/19 1002      Row Name 02/04/19 0922             Functional Mobility    Functional Mobility- Ind. Level  supervision required;verbal cues required SBA to supervision   -KF      Functional Mobility- Device  rolling walker  -KF      Functional Mobility-Distance (Feet)  116  -KF      Functional Mobility- Safety Issues  supplemental O2  -KF      Functional Mobility- Comment  no LOB, cued for safety awareness and environmental scanning as well as pacing   -KF      Recorded by [KF] Alona Hernandez, OT 02/04/19 1002      Row Name 02/04/19 0922             Transfer Assessment/Treatment    Transfer Assessment/Treatment  sit-stand transfer;stand-sit transfer;bed-chair transfer;chair-bed transfer;toilet transfer  -KF      Comment (Transfers)  cues for HP   -KF      Recorded by [KF] Alona Hernandez, OT 02/04/19 1002      Row Name 02/04/19 0922             Bed-Chair Transfer    Bed-Chair [place] (Transfers)  supervision;verbal cues  -KF      Assistive Device (Bed-Chair Transfers)  walker, front-wheeled  -KF      Recorded by [KF] Alona Hernandez, OT 02/04/19 1002      Row Name 02/04/19 0922             Chair-Bed Transfer    Chair-Bed [place] (Transfers)  supervision;verbal cues  -KF      Assistive Device (Chair-Bed Transfers)  walker, front-wheeled  -KF      Recorded by [KF] Alona Hernandez, OT 02/04/19 1002      Row Name 02/04/19 0922             Sit-Stand Transfer    Sit-Stand [place] (Transfers)  supervision;verbal cues  -KF      Assistive Device (Sit-Stand Transfers)  -- no AD  -KF      Recorded by [KF] Alona Hernandez, OT 02/04/19 1002      Row Name 02/04/19 0922             Stand-Sit Transfer    Stand-Sit [place] (Transfers)  supervision;verbal cues   -KF      Recorded by [KF] Alona Hernandez, OT 02/04/19 1002      Row Name 02/04/19 0922             Toilet Transfer    Type (Toilet Transfer)  sit-stand;stand-sit  -KF      [place] Level (Toilet Transfer)  supervision;verbal cues  -KF      Assistive Device (Toilet Transfer)  commode;walker, front-wheeled  -KF      Recorded by [KF] Alona Hernandez, OT 02/04/19 1002      Row Name 02/04/19 0922             ADL Assessment/Intervention    BADL Assessment/Intervention  lower body dressing;upper body dressing;grooming  -KF      Recorded by [KF] Alona Hernandez, OT 02/04/19 1002      Row Name 02/04/19 0922             Upper Body Dressing Assessment/Training    Upper Body Dressing [place] Level  don;front opening garment;set up  -KF      Upper Body Dressing Position  edge of bed sitting  -KF      Comment (Upper Body Dressing)  ed provided on UB dressing following PPM precautions dressing L UE first   -KF      Recorded by [KF] Alona Hernandez, OT 02/04/19 1002      Row Name 02/04/19 0922             Lower Body Dressing Assessment/Training    Lower Body Dressing [place] Level  shoes/slippers;set up  -KF      Lower Body Dressing Position  unsupported sitting  -KF      Comment (Lower Body Dressing)  from chair  -KF      Recorded by [KF] Alona Hernandez, OT 02/04/19 1002      Row Name 02/04/19 0922             Grooming Assessment/Training    [place] Level (Grooming)  oral care regimen;supervision;verbal cues  -KF      Grooming Position  sink side;unsupported standing  -KF      Comment (Grooming)  good tolerance, no assist for balance   -KF      Recorded by [KF] Alona Hernandez, OT 02/04/19 1002      Row Name 02/04/19 0922             BADL Safety/Performance    Impairments, BADL Safety/Performance  balance;endurance/activity tolerance;strength;range of motion;shortness of breath  -KF      Skilled BADL Treatment/Intervention  energy conservation;BADL process/adaptation training  -KF       Recorded by [KF] Alona Hernandez, OT 02/04/19 1002      Row Name 02/04/19 0922             Motor Skills Assessment/Interventions    Additional Documentation  Balance (Group);Balance Interventions (Group)  -KF      Recorded by [KF] Alona Hernandez, OT 02/04/19 1002      Row Name 02/04/19 0922             Balance    Balance  static sitting balance;static standing balance;dynamic sitting balance;dynamic standing balance  -KF      Recorded by [KF] Alona Hernandez, OT 02/04/19 1002      Row Name 02/04/19 0922             Static Sitting Balance    Level of [place] (Unsupported Sitting, Static Balance)  independent  -KF      Sitting Position (Unsupported Sitting, Static Balance)  sitting on edge of bed  -KF      Time Able to Maintain Position (Unsupported Sitting, Static Balance)  3 to 4 minutes  -KF      Comment (Unsupported Sitting, Static Balance)  PLB and rest breaks in ADL tasks  -KF      Recorded by [KF] Alona Hernandez, OT 02/04/19 1002      Row Name 02/04/19 0922             Dynamic Sitting Balance    Level of [place], Reaches Outside Midline (Sitting, Dynamic Balance)  independent  -KF      Sitting Position, Reaches Outside Midline (Sitting, Dynamic Balance)  sitting in chair;sitting on edge of bed  -KF      Comment, Reaches Outside Midline (Sitting, Dynamic Balance)  LB dressing, UB dressing  -KF      Recorded by [KF] Alona Hernandez, OT 02/04/19 1002      Row Name 02/04/19 0922             Static Standing Balance    Level of [place] (Supported Standing, Static Balance)  supervision  -KF      Time Able to Maintain Position (Supported Standing, Static Balance)  3 to 4 minutes  -KF      Assistive Device Utilized (Supported Standing, Static Balance)  walker, rolling  -KF      Level of [place] (Unsupported Standing, Static Balance)  standby assist  -KF      Time Able to Maintain Position (Unsupported Standing, Static Balance)  2 to 3 minutes  -KF      Comment (Unsupported Standing,  Static Balance)  w/o AD at sink side  -KF      Recorded by [KF] Alona Hernandez, OT 02/04/19 1002      Row Name 02/04/19 0922             Dynamic Standing Balance    Level of [place], Reaches Outside Midline (Standing, Dynamic Balance)  supervision  -KF      Time Able to Maintain Position, Reaches Outside Midline (Standing, Dynamic Balance)  2 to 3 minutes  -KF      Assistive Device Utilized (Supported Standing, Dynamic Balance)  walker, rolling  -KF      Comment, Reaches Outside Midline (Standing, Dynamic Balance)  funct mob, funct transfer training   -KF      Recorded by [KF] Alona Hernandez, OT 02/04/19 1002      Row Name 02/04/19 0922             Positioning and Restraints    Pre-Treatment Position  in bed  -KF      Post Treatment Position  bed  -KF      In Bed  notified nsg;supine;fowlers;call light within reach;encouraged to call for assist;side rails up x2;legs elevated  -KF      Recorded by [KF] Alona Hernandez, OT 02/04/19 1002      Row Name 02/04/19 0922             Pain Assessment    Additional Documentation  Pain Scale: FACES Pre/Post-Treatment (Group)  -KF      Recorded by [KF] Alona Hernandez, OT 02/04/19 1002      Row Name 02/04/19 0922             Pain Scale: Numbers Pre/Post-Treatment    Pain Location - Side  Left  -KF      Pain Location - Orientation  generalized  -KF      Pain Location  shoulder  -KF      Pre/Post Treatment Pain Comment  no increase in pain, tolerated   -KF      Pain Intervention(s)  Repositioned;Ambulation/increased activity  -KF      Recorded by [KF] Alona Hernandez, OT 02/04/19 1002      Row Name 02/04/19 0922             Pain Scale: FACES Pre/Post-Treatment    Pain: FACES Scale, Pretreatment  2-->hurts little bit  -KF      Pain: FACES Scale, Post-Treatment  2-->hurts little bit  -KF      Recorded by [KF] Alona Hernandez, OT 02/04/19 1002      Row Name                Wound 01/21/19 2000 Left posterior head laceration    Wound - Properties Group Date first  assessed: 01/21/19 [PAUL] Time first assessed: 2000 [PAUL] Present On Admission : yes [PAUL] Side: Left [PAUL] Orientation: posterior [PAUL] Location: head [PAUL] Type: laceration [PAUL] Number of Staples Placed: 6 [KC2] Recorded by:  [PAUL] Cyn Jaquez RN 01/22/19 0432 [KC2] Cyn Jaquez RN 01/22/19 0435    Row Name                Wound 01/22/19 2008 Left elbow abrasion    Wound - Properties Group Date first assessed: 01/22/19 [CC] Time first assessed: 2008 [CC] Present On Admission : yes [CC] Side: Left [CC] Location: elbow [CC] Type: abrasion [CC] Recorded by:  [CC] Erica Lam RN 01/22/19 2332    Row Name                Wound 01/26/19 2000 coccyx other (see comments)    Wound - Properties Group Date first assessed: 01/26/19 [LL] Time first assessed: 2000 [LL] Present On Admission : no [LL] Location: coccyx [LL] Type: other (see comments) [LL], RED PRESSURE AREA  Additional Comments: BILATERAL MEDIAL, UPPER ISCHIAL AND SACRO-COCCYGEAL [LL] Recorded by:  [LL] Tran Page RN 01/27/19 0048    Row Name                Wound 01/31/19 0939 Left upper chest incision;surgical    Wound - Properties Group Date first assessed: 01/31/19 [LLA] Time first assessed: 0939 [LLA] Present On Admission : no [LLA] Side: Left [LLA] Orientation: upper [LLA] Location: chest [LLA] Type: incision;surgical [LLA] Recorded by:  [LLA] Janice Montelongo RN 01/31/19 0940    Row Name 02/04/19 0922             Plan of Care Review    Plan of Care Reviewed With  patient  -KF      Recorded by [KF] Alona Hernandez, OT 02/04/19 1002      Row Name 02/04/19 0922             Outcome Summary/Treatment Plan (OT)    Daily Summary of Progress (OT)  progress toward functional goals is good  -KF      Recorded by [KF] Alona Hernandez, OT 02/04/19 1002        User Key  (r) = Recorded By, (t) = Taken By, (c) = Cosigned By    Initials Name Effective Dates Discipline    Janice Nicholas RN 06/16/16 -  Nurse    Erica Oneil RN 06/16/16 -  Nurse    NATALIE  Tran Page RN 06/16/16 -  Nurse    Cyn Leo RN 02/22/17 -  Nurse    Alona Decker OT 04/03/18 -  OT        Wound 01/21/19 2000 Left posterior head laceration (Active)   Closure Open to air 2/3/2019  8:04 PM   Periwound ecchymotic 2/3/2019  8:04 PM   Drainage Amount none 2/3/2019  8:04 PM       Wound 01/22/19 2008 Left elbow abrasion (Active)   Closure Open to air 2/3/2019  8:04 PM   Base scab 2/3/2019  8:04 PM   Periwound dry;redness 2/3/2019  8:04 PM       Wound 01/31/19 0939 Left upper chest incision;surgical (Active)   Dressing Appearance dry;intact;no drainage 2/3/2019  8:04 PM   Closure Adhesive closure strips 2/3/2019  8:04 PM   Drainage Amount none 2/3/2019  8:04 PM     Rehab Goal Summary     Row Name 02/04/19 0922             Transfer Goal 1 (OT)    Progress/Outcome (Transfer Goal 1, OT)  goal met  -KF         Bathing Goal 1 (OT)    Progress/Outcomes (Bathing Goal 1, OT)  goal ongoing  -KF         Dressing Goal 1 (OT)    Progress/Outcome (Dressing Goal 1, OT)  goal partially met met for shoes  -KF         Toileting Goal 1 (OT)    Progress/Outcome (Toileting Goal 1, OT)  good progress toward goal;goal ongoing through simulated toileting tasks  -KF          Activity Tolerance Goal 1 (OT)    Activity Level (Endurance Goal 1, OT)  15 min activity;O2 sat >/ equal to 88%  -KF      Time Frame (Activity Tolerance Goal 1, OT)  long term goal (LTG);by discharge  -KF      Progress/Outcome (Activity Tolerance Goal 1, OT)  good progress toward goal;goal revised this date;other (see comments) previous goal met, upgraded  -KF        User Key  (r) = Recorded By, (t) = Taken By, (c) = Cosigned By    Initials Name Provider Type Discipline    Alona Decker, OT Occupational Therapist OT        Occupational Therapy Education     Title: PT OT SLP Therapies (Not Started)     Topic: Occupational Therapy (Done)     Point: ADL training (Done)     Description: Instruct learner(s) on proper safety  adaptation and remediation techniques during self care or transfers.   Instruct in proper use of assistive devices.    Learning Progress Summary           Patient Acceptance, E,TB,D, VU,NR by KF at 2/4/2019  9:22 AM    Comment:  Incorporation of rest breaks with grooming tasks, PLB incorporation in to functional mobility and functional transfers    Acceptance, E,D, VU by ZC at 2/1/2019 10:50 AM    Comment:  EC/WS. Safety during ADL dressing to reduce falls. Safety during HEP. HEP technique alterations w/ percautions.    Acceptance, E, VU by TA at 1/29/2019  1:59 PM    Comment:  Safety with ADLs; reinforced need for call for assist with OOB activities.    Acceptance, E,TB,D, VU,DU by KF at 1/28/2019  9:15 AM    Comment:  Safe awareness for LB dressing and incorporation of EC techniques for LB dressing    Acceptance, E, VU,NR by AC at 1/25/2019  2:57 PM    Comment:  adaptive breathing    Acceptance, E,TB,D, DU,VU by KF at 1/23/2019  1:28 PM    Comment:  Purpose of skilled OT services, PLB, EC for ADL completion in toileting, safe seq with RW with cues for environmental scanning                   Point: Home exercise program (Done)     Description: Instruct learner(s) on appropriate technique for monitoring, assisting and/or progressing therapeutic exercises/activities.    Learning Progress Summary           Patient Acceptance, E,TB,D, VU,NR by KF at 2/4/2019  9:22 AM    Comment:  Incorporation of rest breaks with grooming tasks, PLB incorporation in to functional mobility and functional transfers    Acceptance, E,D, VU by ZABBY at 2/1/2019 10:50 AM    Comment:  EC/WS. Safety during ADL dressing to reduce falls. Safety during HEP. HEP technique alterations w/ percautions.                   Point: Precautions (Done)     Description: Instruct learner(s) on prescribed precautions during self-care and functional transfers.    Learning Progress Summary           Patient Acceptance, E,TB,D, VU,NR by KF at 2/4/2019  9:22 AM     Comment:  Incorporation of rest breaks with grooming tasks, PLB incorporation in to functional mobility and functional transfers    Acceptance, E,D, VU by ZC at 2/1/2019 10:50 AM    Comment:  EC/WS. Safety during ADL dressing to reduce falls. Safety during HEP. HEP technique alterations w/ percautions.    Acceptance, E, VU by TA at 1/29/2019  1:59 PM    Comment:  Safety with ADLs; reinforced need for call for assist with OOB activities.    Acceptance, E,TB,D, VU,DU by KF at 1/28/2019  9:15 AM    Comment:  Safe awareness for LB dressing and incorporation of EC techniques for LB dressing    Acceptance, E,TB,D, DU,VU by KF at 1/23/2019  1:28 PM    Comment:  Purpose of skilled OT services, PLB, EC for ADL completion in toileting, safe seq with RW with cues for environmental scanning                   Point: Body mechanics (Done)     Description: Instruct learner(s) on proper positioning and spine alignment during self-care, functional mobility activities and/or exercises.    Learning Progress Summary           Patient Acceptance, E,TB,D, VU,NR by KF at 2/4/2019  9:22 AM    Comment:  Incorporation of rest breaks with grooming tasks, PLB incorporation in to functional mobility and functional transfers    Acceptance, E,D, VU by ZC at 2/1/2019 10:50 AM    Comment:  EC/WS. Safety during ADL dressing to reduce falls. Safety during HEP. HEP technique alterations w/ percautions.    Acceptance, E, NR by PD at 1/30/2019 12:54 AM    Acceptance, E,TB,D, VU,DU by KF at 1/28/2019  9:15 AM    Comment:  Safe awareness for LB dressing and incorporation of EC techniques for LB dressing    Acceptance, E,TB,D, DU,VU by KF at 1/23/2019  1:28 PM    Comment:  Purpose of skilled OT services, PLB, EC for ADL completion in toileting, safe seq with RW with cues for environmental scanning                               User Key     Initials Effective Dates Name Provider Type Discipline    AC 06/23/15 -  Akilah Quiroga, OT Occupational Therapist OT     TA 03/14/16 -  Kamron Inman, OT Occupational Therapist OT    PD 05/30/17 -  Desmond Garay RN Registered Nurse Nurse    KF 04/03/18 -  Alona Hernandez, OT Occupational Therapist OT     01/03/19 -  Bo Esposito OT Student OT Student OT                OT Recommendation and Plan  Outcome Summary/Treatment Plan (OT)  Daily Summary of Progress (OT): progress toward functional goals is good  Anticipated Discharge Disposition (OT): home with assist, home with home health  Planned Therapy Interventions (OT Eval): activity tolerance training, adaptive equipment training, BADL retraining, cognitive/visual perception retraining, functional balance retraining, IADL retraining, neuromuscular control/coordination retraining, occupation/activity based interventions, patient/caregiver education/training, ROM/therapeutic exercise, strengthening exercise, transfer/mobility retraining  Therapy Frequency (OT Eval): daily  Daily Summary of Progress (OT): progress toward functional goals is good  Plan of Care Review  Plan of Care Reviewed With: patient  Plan of Care Reviewed With: patient  Outcome Summary: Pt improving cued for pacing, PLB, and energy conservation techniques incorporated into ADL tasks, completed grooming at sink side for oral hygiene supervision, supervision for STS, SBA for functional mob of 116 ft; cont IPOT good progress towards goals   Outcome Measures     Row Name 02/04/19 0922 02/01/19 1050          How much help from another is currently needed...    Putting on and taking off regular lower body clothing?  3  -KF  3  -NICHOLAS (r) ZC (t) NICHOLAS (c)     Bathing (including washing, rinsing, and drying)  3  -KF  3  -NICHOLAS (r) ZC (t) NICHOLAS (c)     Toileting (which includes using toilet bed pan or urinal)  4  -KF  4  -NICHOLAS (r) ZC (t) NICHOLAS (c)     Putting on and taking off regular upper body clothing  4  -KF  4  -NICHOLAS (r) ZC (t) NICHOLAS (c)     Taking care of personal grooming (such as brushing teeth)  4  -KF  4  -NICHOLAS (r)  ZC (t) NICHOLAS (c)     Eating meals  4  -KF  4  -NICHOLAS (r) ZC (t) NICHOLAS (c)     Score  22  -KF  22  -NICHOLAS (r) ZC (t)        Functional Assessment    Outcome Measure Options  --  -KF  AM-PAC 6 Clicks Daily Activity (OT)  -NICHOLAS (r) ZC (t) NICHOLAS (c)       User Key  (r) = Recorded By, (t) = Taken By, (c) = Cosigned By    Initials Name Provider Type    NICHOLAS Blas Sylviatessie Jenkins, OT Occupational Therapist    Alona Decker, OT Occupational Therapist    ZC Class, Bo HUI, OT Student OT Student           Time Calculation:   Time Calculation- OT     Row Name 02/04/19 0922             Time Calculation- OT    OT Start Time  0922  -KF      OT Stop Time  0940  -KF      OT Time Calculation (min)  18 min  -KF      Total Timed Code Minutes- OT  18 minute(s)  -KF      OT Received On  02/04/19  -KF      OT Goal Re-Cert Due Date  02/14/19  -KF         Timed Charges    71663 - OT Therapeutic Activity Minutes  18  -KF        User Key  (r) = Recorded By, (t) = Taken By, (c) = Cosigned By    Initials Name Provider Type    Alona Decker, OT Occupational Therapist           Therapy Suggested Charges     Code   Minutes Charges    89417 (CPT®) Hc Ot Neuromusc Re Education Ea 15 Min      63053 (CPT®) Hc Ot Ther Proc Ea 15 Min      95309 (CPT®) Hc Ot Therapeutic Act Ea 15 Min 18 1    20010 (CPT®) Hc Ot Manual Therapy Ea 15 Min      81467 (CPT®) Hc Ot Iontophoresis Ea 15 Min      02874 (CPT®) Hc Ot Elec Stim Ea-Per 15 Min      79164 (CPT®) Hc Ot Ultrasound Ea 15 Min      53469 (CPT®) Hc Ot Self Care/Mgmt/Train Ea 15 Min      Total  18 1        Therapy Charges for Today     Code Description Service Date Service Provider Modifiers Qty    [id number] HC OT THERAPEUTIC ACT EA 15 MIN 2/4/2019 Alona Hernandez, OT GO 1               Alona Hernandez OT  2/4/2019

## 2019-02-04 NOTE — PROGRESS NOTES
[facility] Medicine Services  PROGRESS NOTE    Patient Name: Jani Pena  : 1950  MRN: [id number]    Date of Admission: 2019  Length of Stay: 13  Primary Care Physician: Miguel Rojas MD    Subjective   Subjective     CC:  Follow up VTACH, nausea/vomiting    HPI:  Talking to mom on phone, c/o being more out of breath today. Not sure he has had his Symbicort today, remains on 2LNC with O2 sats at 94%, but feels winded. No cough. No CP. No other issues.     Review of Systems  Gen- denies fever/chills  Resp- + SOA, + wheezes, denies any cough  CV- denies any chest pain  GI- no nausea, denies V/D, no abdominal pain    Otherwise ROS is negative except as mentioned in the HPI.    Objective   Objective     Vital Signs:   Temp:  [97.8 °F (36.6 °C)-97.9 °F (36.6 °C)] 97.8 °F (36.6 °C)  Heart Rate:  [64-97] 86  Resp:  [16-20] 18  BP: (117-158)/(59-79) 158/77        Physical Exam:  Constitutional: appears older than stated age.  Chronically ill appearing.  Awake, alert, sitting up in bed  HENT: NCAT, mucous membranes moist  Respiratory: Decreased at bases bilaterally, occasional exp. Wheeze noted, respiratory effort mildly labored, on supplemental O2  Cardiovascular: RRR, no murmurs  Gastrointestinal: Positive bowel sounds, soft, nontender, nondistended  Musculoskeletal: No bilateral ankle edema  Psychiatric: Appropriate affect, cooperative  Neurologic: Oriented x 3, strength symmetric in all extremities, Cranial Nerves grossly intact to confrontation, speech clear  Skin: No rashes noted to exposed areas of skin.  Left upper chst with dressing in place to PPM incision site.  Ecchymosis noted in surrounding tissues, as well as to area of left posterior neck.    Results Reviewed:  I have personally reviewed current lab, radiology, and data and agree.    Results from last 7 days   Lab Units 19  0809   WBC 10*3/mm3 9.38   HEMOGLOBIN g/dL 11.2*   HEMATOCRIT % 34.7*   PLATELETS  10*3/mm3 269     Results from last 7 days   Lab Units 02/04/19  0336 02/03/19  0949 01/31/19  0809   SODIUM mmol/L 140 140 135   POTASSIUM mmol/L 4.0 3.7 4.0   CHLORIDE mmol/L 106 104 105   CO2 mmol/L 29.0 28.0 25.0   BUN mg/dL 25* 29* 50*   CREATININE mg/dL 1.30 1.42* 1.50*   GLUCOSE mg/dL 99 164* 84   CALCIUM mg/dL 8.5* 9.1 8.8     Estimated Creatinine Clearance: 57.5 mL/min (by C-G formula based on SCr of 1.3 mg/dL).    No results found for: BNP    Microbiology Results Abnormal     None          Imaging Results (last 24 hours)     ** No results found for the last 24 hours. **          Results for orders placed during the hospital encounter of 01/20/19   Adult Transthoracic Echo Complete W/ Cont if Necessary Per Protocol    Narrative Technically difficult study   1) Borderline LVH , moderate dilation of LV with mild reduction systolic   function ( EF is about 45%)  2) Moderate left atrial enlargement with normal LVedp  3) Aortic sclerosis with mild AI   4) Mild MR   5) Mild TR with PAsp of 48 mm of hg   6) Mild dilation of the RV with normal RV Function   7) Severe hypokinesis of the inferior and septal walls        I have reviewed the medications:    Current Facility-Administered Medications:   •  acetaminophen (TYLENOL) tablet 650 mg, 650 mg, Oral, 4x Daily PRN, Mercy Bryant, APRN, 650 mg at 02/04/19 1309  •  albuterol (PROVENTIL) nebulizer solution 0.083% 2.5 mg/3mL, 2.5 mg, Nebulization, Q4H PRN, Kuns-Ferguson, Denita B, APRN, 2.5 mg at 02/04/19 1417  •  ALPRAZolam (XANAX) tablet 0.5 mg, 0.5 mg, Oral, Nightly PRN, Kuns-Ferguson, Denita B, APRN, 0.5 mg at 02/03/19 1923  •  aspirin EC tablet 81 mg, 81 mg, Oral, Daily, Kuns-Ferguson, Denita B, APRN, 81 mg at 02/04/19 0852  •  atorvastatin (LIPITOR) tablet 10 mg, 10 mg, Oral, Nightly, Ulysses Bass MD, 10 mg at 02/03/19 2003  •  baclofen (LIORESAL) tablet 10 mg, 10 mg, Oral, TID PRN, Christa Trejo APRN, 10 mg at 02/02/19 2052  •  budesonide-formoterol  (SYMBICORT) 80-4.5 MCG/ACT inhaler 1 puff, 1 puff, Inhalation, Daily, Deloris-Denita Ferguson B, APRN, 1 puff at 02/04/19 1417  •  clopidogrel (PLAVIX) tablet 75 mg, 75 mg, Oral, Daily, Deloris-Jaime Fergusonice B, APRN, 75 mg at 02/04/19 0852  •  docusate sodium (COLACE) capsule 100 mg, 100 mg, Oral, BID, Venessa Hilliard PA-C, 100 mg at 02/04/19 0852  •  escitalopram (LEXAPRO) tablet 10 mg, 10 mg, Oral, Daily, Deloris-Jaime Fergusonice B, APRN, 10 mg at 02/04/19 0852  •  finasteride (PROSCAR) tablet 5 mg, 5 mg, Oral, Daily, Deloris-Ferguson, Denita B, APRN, 5 mg at 02/04/19 0852  •  fluticasone (FLONASE) 50 MCG/ACT nasal spray 1 spray, 1 spray, Each Nare, Daily PRN, Jaime Tavaresice B, APRN  •  heparin (porcine) 5000 UNIT/ML injection 5,000 Units, 5,000 Units, Subcutaneous, Q8H, Rolf Johnson MD, 5,000 Units at 02/04/19 1432  •  melatonin sublingual tablet 5 mg, 5 mg, Sublingual, Nightly, Venessa Hilliard PA-C, 5 mg at 02/03/19 2003  •  metoclopramide (REGLAN) tablet 10 mg, 10 mg, Oral, TID AC, Fito Street MD, 10 mg at 02/04/19 1147  •  metoprolol tartrate (LOPRESSOR) tablet 25 mg, 25 mg, Oral, Q12H, Denita Tavares B, APRN, 25 mg at 02/04/19 0852  •  mirtazapine (REMERON) tablet 15 mg, 15 mg, Oral, Nightly, Venessa Hilliard PA-C, 15 mg at 02/03/19 2003  •  nitroglycerin (NITROSTAT) SL tablet 0.4 mg, 0.4 mg, Sublingual, Q5 Min PRN, Denita Tavares, APRN  •  ondansetron (ZOFRAN) injection 4 mg, 4 mg, Intravenous, Q6H PRN, Shawanda Swain PA, 4 mg at 02/01/19 0911  •  ondansetron (ZOFRAN) tablet 4 mg, 4 mg, Oral, Q6H PRN, Venessa Hilliard PA-C, 4 mg at 01/31/19 1440  •  oxybutynin (DITROPAN) tablet 5 mg, 5 mg, Oral, Nightly, Denita Tavares, PAL, 5 mg at 02/03/19 2003  •  pantoprazole (PROTONIX) EC tablet 40 mg, 40 mg, Oral, NEELA LUO, Fito Street MD, 40 mg at 02/04/19 0500  •  polyethylene glycol 3350 powder (packet), 17 g, Oral, Daily, Venessa Hilliard PA-C, 17 g  at 02/04/19 0851  •  potassium & sodium phosphates (PHOS-NAK) 280-160-250 MG packet - for Phosphorus less than 1.25 mg/dL, 2 packet, Oral, Q6H PRN **OR** potassium & sodium phosphates (PHOS-NAK) 280-160-250 MG packet - for Phosphorus 1.25 - 2.5 mg/dL, 2 packet, Oral, Once PRN, Tej Webb MD, 2 packet at 01/28/19 1615  •  predniSONE (DELTASONE) tablet 10 mg, 10 mg, Oral, Daily With Breakfast, Venessa Hilliard PA-C, 10 mg at 02/04/19 0852  •  sodium chloride 0.9 % flush 3 mL, 3 mL, Intravenous, Q12H, Huang, Omar, DO, 3 mL at 02/04/19 0852  •  sodium chloride 0.9 % flush 3-10 mL, 3-10 mL, Intravenous, PRN, Huang, Omar, DO, 10 mL at 02/03/19 2155  •  spironolactone (ALDACTONE) tablet 25 mg, 25 mg, Oral, Daily, Kuns-Ferguson, Denita B, APRN, 25 mg at 02/04/19 0852  •  tamsulosin (FLOMAX) 24 hr capsule 0.4 mg, 0.4 mg, Oral, Nightly, Kuns-Ferguson, Denita B, APRN, 0.4 mg at 02/03/19 2003  •  tobramycin 0.3 % ophthalmic solution 1 drop, 1 drop, Both Eyes, Q4H, Rolf Johnson MD, 1 drop at 02/04/19 1147  •  traMADol (ULTRAM) tablet 50 mg, 50 mg, Oral, Q8H PRN, Kuns-Ferguson, Denita B, APRN, 50 mg at 02/01/19 2042      Assessment/Plan   Assessment / Plan     Active Hospital Problems    Diagnosis Date Noted   • **Sick sinus syndrome (CMS/HCC) [I49.5] 02/03/2019     · Dual-chamber pacemaker (SJM)  for sick sinus syndrome by Omar Encinas, 1/31/2019     • N&V (nausea and vomiting) [R11.2] 01/22/2019   • Scalp laceration [S01.01XA] 01/22/2019   • Esophageal dysphagia [R13.10] 01/22/2019     Added automatically from request for surgery [id number]     • Anemia [D64.9] 09/11/2018     Added automatically from request for surgery [id number]     • Dysphagia [R13.10] 08/14/2018   • Chronic diastolic congestive heart failure (CMS/HCC) [I50.32] 03/19/2018     · Echo @ [facility] (1/21/19): LVEF 45%. Moderate LA enlargement. Mild aortic insufficiency. Hypokinesis of the inferior and septal walls     • Atrial fibrillation (CMS/HCC)  [I48.91] 08/09/2016     · Chads VASC = 3 (age, CAD, HTN)     • Chronic kidney disease, stage III (moderate) (CMS/[facility]) [N18.3] 08/09/2016   • Sleep apnea [G47.30] 08/09/2016   • Essential hypertension [I10] 08/09/2016   • Steroid-dependent COPD (CMS/[facility]) [J44.9] 08/09/2016   • Type 2 diabetes mellitus with stage 3 chronic kidney disease (CMS/[facility]) [E11.22, N18.3] 08/09/2016          Brief Hospital Course to date:  Jani Pena is a 68 y.o. male with PMH significant for HTN, HLD, chronic respiratory failure on 3-4L NC, COPD on chronic steroid therapy, CKD III (baseline Cr 1.4), chronic systolic CHF and atrial fibrillation (s/p implantable pulse generator 2006).  Admitted to [facility] 1/20/19 after syncopal event with head injury/scalp lacerations.  Transferred to [facility] 1/22/19 for ventricular arrhythmia and elevated troponin.      Scalp laceration  - staples 1/20 at [facility], removed 1/30  - stable, no active bleed    Syncope  NSVT  High-grade AV block  - cardiology following  -Patient needs beta blockade due to history of NSVT and LV dysfunction  - PPM placed per Dr. Encinas    Non-obstructive CAD  Elevated troponins  - Troponin 0.034 >0.057.  EKG NSR with freq pvcs.  Cardiology does not plan further ischemic evaluation  -[facility] 5/2018 with mild diffuse atherosclerosis, no significant flow-limiting CAD  - Continue DAPT/statin/BB    Chronic systolic heart failure  -echo 1/2/19 EF 45%    Mild oral dysphagia  Esophageal dysphagia/dysmotility  - /p esophageal dilation historically - missed a recent appointment 5 months ago for repeat dilation   - MBS/FEES on 1/13 - mild oral dysphagia. Soft textures, thin liquids   - Needs outpatient esophageal dilation at some point - will need to be off anticoagulation      Persistent nausea/vomiting   Gastroparesis   - Appreciate GI evaluation. S/p EGD on 1/26/19. EGD showed a large amount of undigested food in the stomach with no evidence of gastric outlet obstruction, consistent with  gastroparesis  --still intermittent n/v today 2/1/19  - IV Reglan - switched to PO while in-house  - At d/c, stop reglan - Cassandra Root 500mg PO TID before meals   - Boost with meals     Chronic respiratory failure with hypoxia  COPD, steroid-dependent  - on prednisone 10mg daily   --respiratory status more labored today, SOA with ambulation in the room, unsure if he has received his Symbicort, is scheduled for daily, will increase to BID   - monitor, sat 94-95% at this time     CKD III, baseline creatinine 1.4. Improved to 1.50 last ckd 1/31  HTN, BP is controlled  Hyperlipidemia, statin   PEDRO, NIPPV QHS       DVT Prophylaxis:  Heparin    Disposition: I expect the patient to be discharged to rehab when arranged, pending referral     CODE STATUS:   Code Status and Medical Interventions:   Ordered at: 01/23/19 4968     Level Of Support Discussed With:    Patient     Code Status:    CPR     Medical Interventions (Level of Support Prior to Arrest):    Full         Electronically signed by PAL Woodruff, 02/04/19, 3:19 PM.

## 2019-02-04 NOTE — PLAN OF CARE
Problem: Patient Care Overview  Goal: Plan of Care Review  Outcome: Ongoing (interventions implemented as appropriate)   02/04/19 4134   Coping/Psychosocial   Plan of Care Reviewed With patient   Plan of Care Review   Progress improving   OTHER   Outcome Summary Pt improving cued for pacing, PLB, and energy conservation techniques incorporated into ADL tasks, completed grooming at sink side for oral hygiene supervision, supervision for STS, SBA for functional mob of 116 ft; cont IPOT good progress towards goals

## 2019-02-04 NOTE — THERAPY PROGRESS REPORT/RE-CERT
Acute Care - Physical Therapy Progress Note  [facility]     Patient Name: Jani Pena  : 1950  MRN: [id number]  Today's Date: 2019  Onset of Illness/Injury or Date of Surgery: 19  Date of Referral to PT: 19  Referring Physician: PAL Trejo    Admit Date: 2019    Visit Dx:    ICD-10-CM ICD-9-CM   1. Impaired functional mobility, balance, gait, and endurance Z74.09 V49.89   2. Oral phase dysphagia R13.11 787.21   3. Impaired mobility and ADLs Z74.09 799.89   4. Persistent atrial fibrillation (CMS/HCC) I48.1 427.31   5. Chronic systolic congestive heart failure (CMS/HCC) I50.22 428.22     428.0   6. Ventricular tachycardia (CMS/HCC) I47.2 427.1   7. Type 2 diabetes mellitus with complication, without long-term current use of insulin (CMS/[facility]) E11.8 250.90   8. Physical deconditioning R53.81 799.3   9. Nausea R11.0 787.02   10. Esophageal dysphagia R13.10 787.20     Patient Active Problem List   Diagnosis   • Anxiety   • Atrial fibrillation (CMS/HCC)   • Chronic kidney disease, stage III (moderate) (CMS/HCC)   • Steroid-dependent COPD (CMS/HCC)   • Degeneration of cervical intervertebral disc   • Type 2 diabetes mellitus with stage 3 chronic kidney disease (CMS/HCC)   • GERD without esophagitis   • Diverticulosis   • Hemorrhoids   • Hiatal hernia   • Hyperlipidemia   • Essential hypertension   • Kyphosis, acquired   • Sleep apnea   • Enlarged prostate without lower urinary tract symptoms (luts)   • History of arthritis   • Back pain   • Depression   • History of ventricular septal defect   • Impaired mobility and ADLs   • Chronic diastolic congestive heart failure (CMS/HCC)   • Coronary artery disease involving native coronary artery of native heart with angina pectoris (CMS/HCC)   • Lumbar radiculopathy   • Dysphagia   • Anemia   • N&V (nausea and vomiting)   • Scalp laceration   • Esophageal dysphagia   • Cardiac pacemaker in situ   • Sick sinus syndrome (CMS/HCC)       Therapy  Treatment    Rehabilitation Treatment Summary     Row Name 02/04/19 1434 02/04/19 0922          Treatment Time/Intention    Discipline  physical therapist  (Pended)   -JR  occupational therapist  -KF     Document Type  progress note/recertification  (Pended)   -JR  therapy note (daily note)  -KF     Subjective Information  complains of;dyspnea;fatigue  (Pended)   -JR  complains of;fatigue;dyspnea  -KF     Mode of Treatment  physical therapy  (Pended)   -JR  individual therapy;occupational therapy  -KF     Patient/Family Observations  Pt supine, 1.5L O2NC, on tele, IV heplocked.  (Pended)   -JR  no family present, Pt supine IV heplocked, NC throughout   -KF     Care Plan Review  care plan/treatment goals reviewed;patient/other agree to care plan  (Pended)   -JR  evaluation/treatment results reviewed;care plan/treatment goals reviewed;risks/benefits reviewed;current/potential barriers reviewed;patient/other agree to care plan  -KF     Therapy Frequency (PT Clinical Impression)  daily  (Pended)   -JR  --     Patient Effort  good  (Pended)   -JR  excellent  -KF     Comment  --  provided EC handout for education   -KF     Existing Precautions/Restrictions  fall;oxygen therapy device and L/min;pacemaker  (Pended)   -  fall;oxygen therapy device and L/min;pacemaker  -KF     Treatment Considerations/Comments  --  PPM throughout   -KF     Patient Response to Treatment  --  tolerated  -KF     Recorded by [JR] Mable Mcclain, PT Student 02/04/19 1525 [KF] Alona Hernandez, OT 02/04/19 1002     Row Name 02/04/19 1434 02/04/19 0922          Vital Signs    Pre Systolic BP Rehab  158  (Pended)   -  158  -KF     Pre Treatment Diastolic BP  77  (Pended)   -  77 RN cleared vitals stable  -KF     Pretreatment Heart Rate (beats/min)  76  (Pended)   -JR  81  -KF     Posttreatment Heart Rate (beats/min)  81  (Pended)   -JR  --     Pre SpO2 (%)  97  (Pended)   -JR  95  -KF     O2 Delivery Pre Treatment  supplemental O2  (Pended)   1.5L  -JR  nasal cannula  -KF     Intra SpO2 (%)  --  87  -KF     O2 Delivery Intra Treatment  supplemental O2  (Pended)  3L  -JR  supplemental O2  -KF     Post SpO2 (%)  94  (Pended)   -JR  95  -KF     O2 Delivery Post Treatment  supplemental O2  (Pended)  1.5  -JR  supplemental O2  -KF     Pre Patient Position  Supine  (Pended)   -JR  Supine  -KF     Intra Patient Position  Standing  (Pended)   -  Standing  -KF     Post Patient Position  Supine  (Pended)   -  Supine  -KF     Recovery Time  --  less than 30 seconds  -KF     Rest Breaks   --  2  -KF     Recorded by [JR] Mable Mcclain, PT Student 02/04/19 1525 [KF] Alona Hernandez, OT 02/04/19 1002     Row Name 02/04/19 1434 02/04/19 0922          Cognitive Assessment/Intervention    Additional Documentation  Cognitive Assessment/Intervention (Group)  (Pended)   -  Cognitive Assessment/Intervention (Group)  -KF     Recorded by [JR] Mable Mcclain, PT Student 02/04/19 1532 [KF] Alona Hernandez, OT 02/04/19 1002     Row Name 02/04/19 1434 02/04/19 0922          Cognitive Assessment/Intervention- PT/OT    Affect/Mental Status (Cognitive)  WFL  (Pended)   -  WFL  -KF     Orientation Status (Cognition)  oriented x 4  (Pended)   -  oriented x 4  -KF     Follows Commands (Cognition)  WFL  (Pended)   -  WFL;follows one step commands;over 90% accuracy;verbal cues/prompting required;repetition of directions required  -     Cognitive Function (Cognitive)  safety deficit  (Pended)   -  safety deficit  -KF     Safety Deficit (Cognitive)  mild deficit;safety precautions awareness;safety precautions follow-through/compliance  (Pended)   -  mild deficit;safety precautions awareness  -KF     Cognitive Interventions (Cognitive)  --  occupation/activity based interventions  -KF     Personal Safety Interventions  fall prevention program maintained;gait belt;nonskid shoes/slippers when out of bed  (Pended)   -  fall prevention program maintained;gait  belt;muscle strengthening facilitated;nonskid shoes/slippers when out of bed  -KF     Recorded by [JR] Mable Mcclain, PT Student 02/04/19 1532 [KF] Alona Hernandez, OT 02/04/19 1002     Row Name 02/04/19 1434 02/04/19 0922          Safety Issues, Functional Mobility    Safety Issues Affecting Function (Mobility)  safety precaution awareness;safety precautions follow-through/compliance  (Pended)   -JR  awareness of need for assistance;insight into deficits/self awareness  -KF     Impairments Affecting Function (Mobility)  balance;endurance/activity tolerance;shortness of breath;strength  (Pended)   -JR  balance;endurance/activity tolerance  -KF     Comment, Safety Issues/Impairments (Mobility)  --  anxious behaviors at times, cued for pacing   -KF     Recorded by [JR] Mable Mcclain, PT Student 02/04/19 1532 [KF] Alona Hernandez, OT 02/04/19 1002     Row Name 02/04/19 1434 02/04/19 0922          Bed Mobility Assessment/Treatment    Bed Mobility Assessment/Treatment  sit-supine;scooting/bridging;supine-sit  (Pended)   -JR  supine-sit;sit-supine;scooting/bridging  -KF     Scooting/Bridging [place] (Bed Mobility)  conditional independence  (Pended)   -JR  supervision;verbal cues  -KF     Supine-Sit [place] (Bed Mobility)  conditional independence  (Pended)   -JR  supervision;verbal cues  -KF     Sit-Supine [place] (Bed Mobility)  conditional independence  (Pended)   -JR  supervision;verbal cues  -KF     Bed Mobility, Safety Issues  decreased use of arms for pushing/pulling  (Pended)   -JR  decreased use of arms for pushing/pulling  -KF     Assistive Device (Bed Mobility)  head of bed elevated  (Pended)   -JR  head of bed elevated  -KF     Comment (Bed Mobility)  Adhered to PPM precautions without VCs  (Pended)   -JR  cues and supervision for PPM precautions, maintained well, no assist able to use R UE and not pull with L UE  -KF     Recorded by [JR] Mable Mcclain, PT Student 02/04/19 1532 [KF]  Alona Hernandez, OT 02/04/19 1002     Row Name 02/04/19 0922             Functional Mobility    Functional Mobility- Ind. Level  supervision required;verbal cues required SBA to supervision   -KF      Functional Mobility- Device  rolling walker  -KF      Functional Mobility-Distance (Feet)  116  -KF      Functional Mobility- Safety Issues  supplemental O2  -KF      Functional Mobility- Comment  no LOB, cued for safety awareness and environmental scanning as well as pacing   -KF      Recorded by [KF] Alona Hernandez, OT 02/04/19 1002      Row Name 02/04/19 1434 02/04/19 0922          Transfer Assessment/Treatment    Transfer Assessment/Treatment  sit-stand transfer;stand-sit transfer  (Pended)   -JR  sit-stand transfer;stand-sit transfer;bed-chair transfer;chair-bed transfer;toilet transfer  -KF     Comment (Transfers)  --  cues for HP   -KF     Recorded by [JR] Mable Mcclain, PT Student 02/04/19 1532 [KF] Alona Hernandez, OT 02/04/19 1002     Row Name 02/04/19 0922             Bed-Chair Transfer    Bed-Chair [place] (Transfers)  supervision;verbal cues  -KF      Assistive Device (Bed-Chair Transfers)  walker, front-wheeled  -KF      Recorded by [KF] Alona Hernandez, OT 02/04/19 1002      Row Name 02/04/19 0922             Chair-Bed Transfer    Chair-Bed [place] (Transfers)  supervision;verbal cues  -KF      Assistive Device (Chair-Bed Transfers)  walker, front-wheeled  -KF      Recorded by [KF] Alona Hernandez, OT 02/04/19 1002      Row Name 02/04/19 1434 02/04/19 0922          Sit-Stand Transfer    Sit-Stand [place] (Transfers)  supervision;verbal cues  (Pended)   -JR  supervision;verbal cues  -KF     Assistive Device (Sit-Stand Transfers)  other (see comments)  (Pended)  no AD  -JR  -- no AD  -KF     Recorded by [JR] Mable Mcclain, PT Student 02/04/19 1532 [KF] Alona Hernandez, OT 02/04/19 1002     Row Name 02/04/19 1434 02/04/19 0922          Stand-Sit Transfer    Stand-Sit  [place] (Transfers)  supervision;verbal cues  (Pended)   -JR  supervision;verbal cues  -KF     Assistive Device (Stand-Sit Transfers)  other (see comments)  (Pended)  no AD  -JR  --     Recorded by [JR] Mable Mcclain, PT Student 02/04/19 1532 [KF] Alona Hernandez, OT 02/04/19 1002     Row Name 02/04/19 0922             Toilet Transfer    Type (Toilet Transfer)  sit-stand;stand-sit  -KF      [place] Level (Toilet Transfer)  supervision;verbal cues  -KF      Assistive Device (Toilet Transfer)  commode;walker, front-wheeled  -KF      Recorded by [KF] Alona Hernandez, OT 02/04/19 1002      Row Name 02/04/19 1434             Gait/Stairs Assessment/Training    06528 - Gait Training Minutes   15  (Pended)   -JR      [place] Level (Gait)  supervision  (Pended)   -JR      Assistive Device (Gait)  walker, front-wheeled  (Pended)   -JR      Distance in Feet (Gait)  350  (Pended)   -JR      Pattern (Gait)  step-through  (Pended)   -JR      Deviations/Abnormal Patterns (Gait)  pati decreased;stride length decreased;bilateral deviations  (Pended)   -JR2      Bilateral Gait Deviations  forward flexed posture;heel strike decreased  (Pended)   -JR2      Comment (Gait/Stairs)  VCs for upright posture, PLB. Pt c/o SOB/fatigue, req 2 brief standing rest breaks. On 3L for amb.  (Pended)   -JR2      Recorded by [JR] Mable Mcclain, PT Student 02/04/19 1532  [JR2] Mable Mcclain, PT Student 02/04/19 1537      Row Name 02/04/19 0922             ADL Assessment/Intervention    BADL Assessment/Intervention  lower body dressing;upper body dressing;grooming  -KF      Recorded by [KF] Alona Hernandez, OT 02/04/19 1002      Row Name 02/04/19 0922             Upper Body Dressing Assessment/Training    Upper Body Dressing [place] Level  don;front opening garment;set up  -KF      Upper Body Dressing Position  edge of bed sitting  -KF      Comment (Upper Body Dressing)  ed provided on UB dressing following PPM  precautions dressing L UE first   -KF      Recorded by [KF] Alona Hernandez, OT 02/04/19 1002      Row Name 02/04/19 0922             Lower Body Dressing Assessment/Training    Lower Body Dressing [place] Level  shoes/slippers;set up  -KF      Lower Body Dressing Position  unsupported sitting  -KF      Comment (Lower Body Dressing)  from chair  -KF      Recorded by [KF] Alona Hernandez, OT 02/04/19 1002      Row Name 02/04/19 0922             Grooming Assessment/Training    [place] Level (Grooming)  oral care regimen;supervision;verbal cues  -KF      Grooming Position  sink side;unsupported standing  -KF      Comment (Grooming)  good tolerance, no assist for balance   -KF      Recorded by [KF] Alona Hernandez, OT 02/04/19 1002      Row Name 02/04/19 0922             BADL Safety/Performance    Impairments, BADL Safety/Performance  balance;endurance/activity tolerance;strength;range of motion;shortness of breath  -KF      Skilled BADL Treatment/Intervention  energy conservation;BADL process/adaptation training  -KF      Recorded by [KF] Alona Hernandez, OT 02/04/19 1002      Row Name 02/04/19 1434 02/04/19 0922          Motor Skills Assessment/Interventions    Additional Documentation  Balance (Group);Balance Interventions (Group);Therapeutic Exercise (Group);Therapeutic Exercise Interventions (Group)  (Pended)   -JR  Balance (Group);Balance Interventions (Group)  -KF     Recorded by [JR] Mable Mcclain, PT Student 02/04/19 1537 [KF] Alona Hernandez, OT 02/04/19 1002     Row Name 02/04/19 1434             Therapeutic Exercise    55580 - PT Therapeutic Exercise Minutes  8  (Pended)   -JR      Recorded by [JR] Mable Mcclain, PT Student 02/04/19 1537      Row Name 02/04/19 1434             Therapeutic Exercise    Lower Extremity (Therapeutic Exercise)  heel slides, bilateral;LAQ (long arc quad), bilateral;marching while seated;quad sets, bilateral  (Pended)   -JR      Lower Extremity Range of  Motion (Therapeutic Exercise)  hip abduction/adduction, bilateral;ankle dorsiflexion/plantar flexion, bilateral  (Pended)   -JR      Exercise Type (Therapeutic Exercise)  AROM (active range of motion)  (Pended)   -JR      Position (Therapeutic Exercise)  seated;supine  (Pended)   -JR      Sets/Reps (Therapeutic Exercise)  1/10  (Pended)   -JR      Recorded by [JR] Mable Mcclain, PT Student 02/04/19 1537      Row Name 02/04/19 1434 02/04/19 0922          Balance    Balance  static sitting balance;static standing balance  (Pended)   -JR  static sitting balance;static standing balance;dynamic sitting balance;dynamic standing balance  -KF     Recorded by [JR] Mable Mcclain, PT Student 02/04/19 1537 [KF] Alona Hernandez, OT 02/04/19 1002     Row Name 02/04/19 1434 02/04/19 0922          Static Sitting Balance    Level of [place] (Unsupported Sitting, Static Balance)  independent  (Pended)   -JR  independent  -KF     Sitting Position (Unsupported Sitting, Static Balance)  sitting on edge of bed  (Pended)   -JR  sitting on edge of bed  -KF     Time Able to Maintain Position (Unsupported Sitting, Static Balance)  4 to 5 minutes  (Pended)   -JR  3 to 4 minutes  -KF     Comment (Unsupported Sitting, Static Balance)  therex  (Pended)   -JR  PLB and rest breaks in ADL tasks  -KF     Recorded by [JR] Mable Mcclain, PT Student 02/04/19 1537 [KF] Alona Hernandez, OT 02/04/19 1002     Row Name 02/04/19 0922             Dynamic Sitting Balance    Level of [place], Reaches Outside Midline (Sitting, Dynamic Balance)  independent  -KF      Sitting Position, Reaches Outside Midline (Sitting, Dynamic Balance)  sitting in chair;sitting on edge of bed  -KF      Comment, Reaches Outside Midline (Sitting, Dynamic Balance)  LB dressing, UB dressing  -KF      Recorded by [KF] Alona Hernandez, OT 02/04/19 1002      Row Name 02/04/19 1434 02/04/19 0922          Static Standing Balance    Level of [place] (Supported  Standing, Static Balance)  supervision  (Pended)   -JR  supervision  -KF     Time Able to Maintain Position (Supported Standing, Static Balance)  2 to 3 minutes  (Pended)   -JR  3 to 4 minutes  -KF     Assistive Device Utilized (Supported Standing, Static Balance)  walker, rolling  (Pended)   -JR  walker, rolling  -KF     Comment (Supported Standing, Static Balance)  upright posture, PLB  (Pended)   -JR  --     Level of [place] (Unsupported Standing, Static Balance)  --  standby assist  -KF     Time Able to Maintain Position (Unsupported Standing, Static Balance)  --  2 to 3 minutes  -KF     Comment (Unsupported Standing, Static Balance)  --  w/o AD at sink side  -KF     Recorded by [JR] Mable Mcclain, PT Student 02/04/19 1537 [KF] Alona Hernandez, OT 02/04/19 1002     Row Name 02/04/19 0922             Dynamic Standing Balance    Level of [place], Reaches Outside Midline (Standing, Dynamic Balance)  supervision  -KF      Time Able to Maintain Position, Reaches Outside Midline (Standing, Dynamic Balance)  2 to 3 minutes  -KF      Assistive Device Utilized (Supported Standing, Dynamic Balance)  walker, rolling  -KF      Comment, Reaches Outside Midline (Standing, Dynamic Balance)  funct mob, funct transfer training   -KF      Recorded by [KF] Alona Hernandez, OT 02/04/19 1002      Row Name 02/04/19 1434 02/04/19 0922          Positioning and Restraints    Pre-Treatment Position  in bed  (Pended)   -JR  in bed  -KF     Post Treatment Position  bed  (Pended)   -JR  bed  -KF     In Bed  notified nsg;fowlers;call light within reach;encouraged to call for assist  (Pended)   -JR  notified nsg;supine;fowlers;call light within reach;encouraged to call for assist;side rails up x2;legs elevated  -KF     Recorded by [JR] Mable Mcclain, PT Student 02/04/19 1537 [KF] Alona Hernandez, OT 02/04/19 1002     Row Name 02/04/19 1434 02/04/19 0922          Pain Assessment    Additional Documentation  Pain Scale:  Numbers Pre/Post-Treatment (Group)  (Pended)   -JR  Pain Scale: FACES Pre/Post-Treatment (Group)  -KF     Recorded by [JR] Mable Mcclain, PT Student 02/04/19 1537 [KF] Alona Hernandez, OT 02/04/19 1002     Row Name 02/04/19 1434 02/04/19 0922          Pain Scale: Numbers Pre/Post-Treatment    Pain Scale: Numbers, Pretreatment  0/10 - no pain  (Pended)   -JR  --     Pain Scale: Numbers, Post-Treatment  0/10 - no pain  (Pended)   -JR  --     Pain Location - Side  --  Left  -KF     Pain Location - Orientation  --  generalized  -KF     Pain Location  --  shoulder  -KF     Pre/Post Treatment Pain Comment  --  no increase in pain, tolerated   -KF     Pain Intervention(s)  --  Repositioned;Ambulation/increased activity  -KF     Recorded by [JR] Mable Mcclain, PT Student 02/04/19 1537 [KF] Alona Hernandez, OT 02/04/19 1002     Row Name 02/04/19 0922             Pain Scale: FACES Pre/Post-Treatment    Pain: FACES Scale, Pretreatment  2-->hurts little bit  -KF      Pain: FACES Scale, Post-Treatment  2-->hurts little bit  -KF      Recorded by [KF] Alona Hernandez, OT 02/04/19 1002      Row Name                Wound 01/21/19 2000 Left posterior head laceration    Wound - Properties Group Date first assessed: 01/21/19 [PAUL] Time first assessed: 2000 [PAUL] Present On Admission : yes [PAUL] Side: Left [PAUL] Orientation: posterior [PAUL] Location: head [PAUL] Type: laceration [PAUL] Number of Staples Placed: 6 [KC2] Recorded by:  [PAUL] Cyn Jaquez RN 01/22/19 0432 [KC2] Cyn Jaquez RN 01/22/19 0435    Row Name                Wound 01/22/19 2008 Left elbow abrasion    Wound - Properties Group Date first assessed: 01/22/19 [CC] Time first assessed: 2008 [CC] Present On Admission : yes [CC] Side: Left [CC] Location: elbow [CC] Type: abrasion [CC] Recorded by:  [CC] Erica Lam RN 01/22/19 [id number]    Row Name                Wound 01/26/19 2000 coccyx other (see comments)    Wound - Properties Group Date first assessed:  01/26/19 [LL] Time first assessed: 2000 [LL] Present On Admission : no [LL] Location: coccyx [LL] Type: other (see comments) [LL], RED PRESSURE AREA  Additional Comments: BILATERAL MEDIAL, UPPER ISCHIAL AND SACRO-COCCYGEAL [LL] Recorded by:  [LL] Tran Page RN 01/27/19 0048    Row Name                Wound 01/31/19 0939 Left upper chest incision;surgical    Wound - Properties Group Date first assessed: 01/31/19 [LLA] Time first assessed: 0939 [LLA] Present On Admission : no [LLA] Side: Left [LLA] Orientation: upper [LLA] Location: chest [LLA] Type: incision;surgical [LLA] Recorded by:  [LLA] Janice Montelongo RN 01/31/19 0940    Row Name 02/04/19 1434 02/04/19 0922          Plan of Care Review    Plan of Care Reviewed With  patient  (Pended)   -  patient  -KF     Recorded by [JR] Mable Mcclain, PT Student 02/04/19 1537 [KF] Alona Hernandez, OT 02/04/19 1002     Row Name 02/04/19 0922             Outcome Summary/Treatment Plan (OT)    Daily Summary of Progress (OT)  progress toward functional goals is good  -KF      Recorded by [KF] Alona Hernandez, OT 02/04/19 1002        User Key  (r) = Recorded By, (t) = Taken By, (c) = Cosigned By    Initials Name Effective Dates Discipline    Janice Nicholas RN 06/16/16 -  Nurse    Erica Oneil RN 06/16/16 -  Nurse    Tran Peck RN 06/16/16 -  Nurse    Cyn Leo RN 02/22/17 -  Nurse    Alona Decker, OT 04/03/18 -  OT    Mable Hendrix, PT Student 12/06/18 -  PT          Wound 01/21/19 2000 Left posterior head laceration (Active)   Closure Open to air 2/4/2019  8:00 AM   Periwound ecchymotic 2/4/2019  8:00 AM   Drainage Amount none 2/4/2019  8:00 AM       Wound 01/22/19 2008 Left elbow abrasion (Active)   Closure Open to air 2/4/2019  8:00 AM   Base scab 2/4/2019  8:00 AM   Periwound dry;redness 2/4/2019  8:00 AM       Wound 01/31/19 0939 Left upper chest incision;surgical (Active)   Dressing Appearance dry;intact;no  drainage 2/4/2019  8:00 AM   Closure Adhesive closure strips 2/4/2019  8:00 AM   Drainage Amount none 2/4/2019  8:00 AM       Rehab Goal Summary     Row Name 02/04/19 1434 02/04/19 1419 02/04/19 0922       Physical Therapy Goals    Bed Mobility Goal Selection (PT)  bed mobility, PT goal 1  (Pended)   -JR  --  (Pended)   -JR  --    Transfer Goal Selection (PT)  transfer, PT goal 1  (Pended)   -JR  --  (Pended)   -JR  --    Gait Training Goal Selection (PT)  gait training, PT goal 1  (Pended)   -JR  --  (Pended)   -JR  --    Stairs Goal Selection (PT)  stairs, PT goal 1  (Pended)   -JR  --  (Pended)   -JR  --       Bed Mobility Goal 1 (PT)    Activity/Assistive Device (Bed Mobility Goal 1, PT)  bed mobility activities, all  (Pended)   -JR  --  (Pended)   -JR  --    [place] Level/Cues Needed (Bed Mobility Goal 1, PT)  independent  (Pended)   -JR  --  (Pended)   -JR  --    Time Frame (Bed Mobility Goal 1, PT)  long term goal (LTG);1 week  (Pended)   -JR  --  (Pended)   -JR  --    Progress/Outcomes (Bed Mobility Goal 1, PT)  good progress toward goal;goal ongoing  (Pended)   -JR  --  --       Transfer Goal 1 (PT)    Activity/Assistive Device (Transfer Goal 1, PT)  sit-to-stand/stand-to-sit;bed-to-chair/chair-to-bed  (Pended)   -JR  --  (Pended)   -JR  --    [place] Level/Cues Needed (Transfer Goal 1, PT)  independent  (Pended)   -JR  --  (Pended)   -JR  --    Time Frame (Transfer Goal 1, PT)  long term goal (LTG);1 week  (Pended)   -JR  --  (Pended)   -JR  --    Progress/Outcome (Transfer Goal 1, PT)  goal ongoing;good progress toward goal  (Pended)   -JR  --  --       Gait Training Goal 1 (PT)    Activity/Assistive Device (Gait Training Goal 1, PT)  gait (walking locomotion);assistive device use;walker, rolling  (Pended)  stable VS;O2 SAT >= 92%;No further syncope/dizzy spells  -JR  --  (Pended)   -JR  --    [place] Level (Gait Training Goal 1, PT)  independent  (Pended)   -JR  --  (Pended)   -JR  --     Distance (Gait Goal 1, PT)  350  (Pended)   -JR  --  (Pended)   -JR  --    Time Frame (Gait Training Goal 1, PT)  long term goal (LTG);1 week  (Pended)   -JR  --  (Pended)   -JR  --    Progress/Outcome (Gait Training Goal 1, PT)  goal ongoing;good progress toward goal  (Pended)   -JR  --  --       Stairs Goal 1 (PT)    Activity/Assistive Device (Stairs Goal 1, PT)  stairs, all skills;cane, straight  (Pended)   -JR  --  (Pended)   -JR  --    [place] Level/Cues Needed (Stairs Goal 1, PT)  independent  (Pended)   -JR  --  (Pended)   -JR  --    Number of Stairs (Stairs Goal 1, PT)  1  (Pended)   -JR  --  (Pended)   -JR  --    Time Frame (Stairs Goal 1, PT)  long term goal (LTG);1 week  (Pended)   -JR  --  (Pended)   -JR  --    Progress/Outcome (Stairs Goal 1, PT)  goal ongoing  (Pended)   -JR  --  --       Patient Education Goal (PT)    Activity (Patient Education Goal, PT)  HEP exer  (Pended)   -JR  --  (Pended)   -JR  --    [place]/Cues/Accuracy (Memory Goal 2, PT)  demonstrates adequately;verbalizes understanding  (Pended)   -JR  --  (Pended)   -JR  --    Time Frame (Patient Education Goal, PT)  long term goal (LTG);1 week  (Pended)   -JR  --  (Pended)   -JR  --    Progress/Outcome (Patient Education Goal, PT)  goal ongoing  (Pended)   -JR  --  --       Transfer Goal 1 (OT)    Progress/Outcome (Transfer Goal 1, OT)  --  --  goal met  -KF       Bathing Goal 1 (OT)    Progress/Outcomes (Bathing Goal 1, OT)  --  --  goal ongoing  -KF       Dressing Goal 1 (OT)    Progress/Outcome (Dressing Goal 1, OT)  --  --  goal partially met met for shoes  -KF       Toileting Goal 1 (OT)    Progress/Outcome (Toileting Goal 1, OT)  --  --  good progress toward goal;goal ongoing through simulated toileting tasks  -KF        Activity Tolerance Goal 1 (OT)    Activity Level (Endurance Goal 1, OT)  --  --  15 min activity;O2 sat >/ equal to 88%  -KF    Time Frame (Activity Tolerance Goal 1, OT)  --  --  long term goal (LTG);by  discharge  -KF    Progress/Outcome (Activity Tolerance Goal 1, OT)  --  --  good progress toward goal;goal revised this date;other (see comments) previous goal met, upgraded  -KF      User Key  (r) = Recorded By, (t) = Taken By, (c) = Cosigned By    Initials Name Provider Type Discipline    KF Alona Hernandez, OT Occupational Therapist OT    Mable Hendrix, PT Student PT Student PT          Physical Therapy Education     Title: PT OT SLP Therapies (Not Started)     Topic: Physical Therapy (In Progress)     Point: Mobility training (In Progress)     Learning Progress Summary           Patient Acceptance, E, NR by  at 2/4/2019  2:19 PM    Comment:  Pt educated on POC, importance of OOB activity and safe functional mobility.    Acceptance, E, NR by AS at 2/1/2019  9:47 AM    Eager, E,D, NR by LUKASZ at 1/30/2019  1:26 PM    Acceptance, E, NR,VU by  at 1/28/2019 10:47 AM    Comment:  Pt educated on POC, importance of OOB activity and safe functional mobility.    Acceptance, E,D, NR by KRISTAL at 1/27/2019 10:24 AM    Eager, E,D, NR by LUKASZ at 1/23/2019 11:28 AM   Family Eager, E,D, NR by LUKASZ at 1/23/2019 11:28 AM                   Point: Home exercise program (In Progress)     Learning Progress Summary           Patient Acceptance, E, NR by  at 2/4/2019  2:19 PM    Comment:  Pt educated on POC, importance of OOB activity and safe functional mobility.    Acceptance, E, NR by AS at 2/1/2019  9:47 AM    Eager, E,D, NR by LUKASZ at 1/30/2019  1:26 PM    Acceptance, E, NR,VU by  at 1/28/2019 10:47 AM    Comment:  Pt educated on POC, importance of OOB activity and safe functional mobility.    Acceptance, E,D, NR by KRISTAL at 1/27/2019 10:24 AM    Eager, E,D, NR by LUKASZ at 1/23/2019 11:28 AM   Family Eager, E,D, NR by LUKASZ at 1/23/2019 11:28 AM                   Point: Body mechanics (In Progress)     Learning Progress Summary           Patient Acceptance, E, NR by  at 2/4/2019  2:19 PM    Comment:  Pt educated on POC, importance of  OOB activity and safe functional mobility.    Acceptance, E, NR by AS at 2/1/2019  9:47 AM    Acceptance, E, VU by MISHEL at 2/1/2019  1:24 AM    Eager, E,D, NR by LUKASZ at 1/30/2019  1:26 PM    Acceptance, E, NR,VU by  at 1/28/2019 10:47 AM    Comment:  Pt educated on POC, importance of OOB activity and safe functional mobility.    Acceptance, E,D, NR by KRISTAL at 1/27/2019 10:24 AM    Eager, E,D, NR by LUKASZ at 1/23/2019 11:28 AM   Family Eager, E,D, NR by LUKASZ at 1/23/2019 11:28 AM                   Point: Precautions (In Progress)     Learning Progress Summary           Patient Acceptance, E, NR by  at 2/4/2019  2:19 PM    Comment:  Pt educated on POC, importance of OOB activity and safe functional mobility.    Acceptance, E, NR by AS at 2/1/2019  9:47 AM    Eager, E,D, NR by LUKASZ at 1/30/2019  1:26 PM    Acceptance, E, NR,VU by  at 1/28/2019 10:47 AM    Comment:  Pt educated on POC, importance of OOB activity and safe functional mobility.    Acceptance, E,D, NR by KRISTAL at 1/27/2019 10:24 AM    Eager, E,D, NR by LUKASZ at 1/23/2019 11:28 AM   Family Eager, E,D, NR by LUKASZ at 1/23/2019 11:28 AM                               User Key     Initials Effective Dates Name Provider Type Discipline     06/19/15 -  Anna Ibanez, PT Physical Therapist PT     06/19/15 -  Carmita Venegas PT Physical Therapist PT    AS 06/22/15 -  Nicole Daniel, PTA Physical Therapy Assistant PT     06/16/16 -  Edouard Gómez, RN Registered Nurse Nurse     12/06/18 -  Mable Mcclain, PT Student PT Student PT                PT Recommendation and Plan  Anticipated Discharge Disposition (PT): home with home health(HHPT then resume OP pulmonary PT)  Therapy Frequency (PT Clinical Impression): (P) daily  Outcome Summary/Treatment Plan (PT)  Anticipated Discharge Disposition (PT): home with home health(HHPT then resume OP pulmonary PT)  Plan of Care Reviewed With: (P) patient  Progress: (P) improving  Outcome Summary: (P) Pt performs bed mobility  tasks with conditional independence, STS transfers with supervision and ambulates 350' with supervision and RW. Increased O2 from 1.5L to 3L for amb, and pt limited by SOB, requiring 2 brief standing rest breaks. Upon returning to room, O2 sats at 94% on 1.5L ~1min following amb. Pt is making good progress towards PT goals. Recommend d/c to SNF at this time.  Outcome Measures     Row Name 02/04/19 1434 02/04/19 0922          How much help from another person do you currently need...    Turning from your back to your side while in flat bed without using bedrails?  4  (Pended)   -JR  --     Moving from lying on back to sitting on the side of a flat bed without bedrails?  4  (Pended)   -JR  --     Moving to and from a bed to a chair (including a wheelchair)?  3  (Pended)   -JR  --     Standing up from a chair using your arms (e.g., wheelchair, bedside chair)?  4  (Pended)   -JR  --     Climbing 3-5 steps with a railing?  3  (Pended)   -JR  --     To walk in hospital room?  3  (Pended)   -JR  --     AM-PAC 6 Clicks Score  21  (Pended)   -EB (r) JR (t)  --        How much help from another is currently needed...    Putting on and taking off regular lower body clothing?  --  3  -KF     Bathing (including washing, rinsing, and drying)  --  3  -KF     Toileting (which includes using toilet bed pan or urinal)  --  4  -KF     Putting on and taking off regular upper body clothing  --  4  -KF     Taking care of personal grooming (such as brushing teeth)  --  4  -KF     Eating meals  --  4  -KF     Score  --  22  -KF        Functional Assessment    Outcome Measure Options  AM-PAC 6 Clicks Basic Mobility (PT)  (Pended)   -JR  --  -KF       User Key  (r) = Recorded By, (t) = Taken By, (c) = Cosigned By    Initials Name Provider Type    Roxi Hilton, LPN Licensed Nurse    KF Alona Hernandez, OT Occupational Therapist    Mable Hendrix, PT Student PT Student         Time Calculation:   PT Charges     Row Name  02/04/19 1434             Time Calculation    Start Time  1434  (Pended)   -      PT Received On  02/04/19  (Pended)   -      PT Goal Re-Cert Due Date  02/14/19  (Pended)   -         Time Calculation- PT    Total Timed Code Minutes- PT  23 minute(s)  (Pended)   -         Timed Charges    03752 - PT Therapeutic Exercise Minutes  8  (Pended)   -JR      13918 - Gait Training Minutes   15  (Pended)   -        User Key  (r) = Recorded By, (t) = Taken By, (c) = Cosigned By    Initials Name Provider Type    Mable Hendrix, PT Student PT Student        Therapy Suggested Charges     Code   Minutes Charges    02206 (CPT®) Hc Pt Neuromusc Re Education Ea 15 Min      23361 (CPT®) Hc Pt Ther Proc Ea 15 Min 8 1    23594 (CPT®) Hc Gait Training Ea 15 Min 15 1    26414 (CPT®) Hc Pt Therapeutic Act Ea 15 Min      07893 (CPT®) Hc Pt Manual Therapy Ea 15 Min      11102 (CPT®) Hc Pt Iontophoresis Ea 15 Min      08442 (CPT®) Hc Pt Elec Stim Ea-Per 15 Min      19188 (CPT®) Hc Pt Ultrasound Ea 15 Min      46456 (CPT®) Hc Pt Self Care/Mgmt/Train Ea 15 Min      11626 (CPT®) Hc Pt Prosthetic (S) Train Initial Encounter, Each 15 Min      48697 (CPT®) Hc Pt Orthotic(S)/Prosthetic(S) Encounter, Each 15 Min      90791 (CPT®) Hc Orthotic(S) Mgmt/Train Initial Encounter, Each 15min      Total  23 2        Therapy Charges for Today     Code Description Service Date Service Provider Modifiers Qty    50991324854 HC PT THER PROC EA 15 MIN 2/4/2019 Mable Mcclain, PT Student GP 1    72475279492 HC GAIT TRAINING EA 15 MIN 2/4/2019 Mable Mcclain, PT Student GP 1          PT G-Codes  Outcome Measure Options: (P) AM-PAC 6 Clicks Basic Mobility (PT)  AM-PAC 6 Clicks Score: (P) 21  Score: 22    Mable Mcclain, PT Student  2/4/2019

## 2019-02-04 NOTE — PROGRESS NOTES
Case Management Discharge Note    Final Note: Per Janice, insurance has approved Mr. Pena transfer to a skilled bed. THey can accept him tomorrow 2-5. I notified him at the bedside. He stated that family can transport him there tomorrow morning. Nurse to call report to [phone] unit E. Transfer summary to be faxed to [phone].    Destination - Selection Complete      Service Provider Request Status Selected Services Address Phone Number Fax Number    [facility] [address] [facility] [address] [phone]      Durable Medical Equipment      No service has been selected for the patient.      Dialysis/Infusion      No service has been selected for the patient.      Home Medical Care - Selection Complete      Service Provider Request Status Selected Services Address Phone Number Fax Number    [facility] Health [facility] [address] [facility] [facility] [address] [phone]      Community Resources      No service has been selected for the patient.             Final Discharge Disposition Code: 03 - skilled nursing facility (SNF)

## 2019-02-04 NOTE — PLAN OF CARE
Problem: Patient Care Overview  Goal: Plan of Care Review  Outcome: Ongoing (interventions implemented as appropriate)    Goal: Individualization and Mutuality  Outcome: Ongoing (interventions implemented as appropriate)   02/04/19 1600   Individualization   Patient Specific Preferences patient with no complaints today. awaiting insurance approval for [facility] for rehab       Problem: Fall Risk (Adult)  Goal: Identify Related Risk Factors and Signs and Symptoms  Outcome: Ongoing (interventions implemented as appropriate)    Goal: Absence of Fall  Outcome: Outcome(s) achieved Date Met: 02/04/19      Problem: Skin Injury Risk (Adult)  Goal: Skin Health and Integrity  Outcome: Ongoing (interventions implemented as appropriate)      Problem: Cardiac: ACS (Acute Coronary Syndrome) (Adult)  Goal: Signs and Symptoms of Listed Potential Problems Will be Absent, Minimized or Managed (Cardiac: ACS)  Outcome: Ongoing (interventions implemented as appropriate)

## 2019-02-04 NOTE — PROGRESS NOTES
Continued Stay Note  [facility]     Patient Name: Jani Pena  MRN: [id number]  Today's Date: 2/4/2019    Admit Date: 1/22/2019    Discharge Plan     Row Name 02/04/19 1143       Plan    Plan  [facility]    Patient/Family in Agreement with Plan  yes    Plan Comments  Per Sylvia in admissions, insurance approval for Mr. Pena' skilled bed is still pending. They will notify me once approval is obtained. I discussed this with Mr. Pena at the bedside.        Discharge Codes    No documentation.             Bam Zepeda

## 2019-02-04 NOTE — PLAN OF CARE
Problem: Patient Care Overview  Goal: Plan of Care Review  Outcome: Ongoing (interventions implemented as appropriate)   02/04/19 9810   Coping/Psychosocial   Plan of Care Reviewed With patient   Plan of Care Review   Progress improving   OTHER   Outcome Summary Pt performs bed mobility tasks with conditional independence, STS transfers with supervision and ambulates 350' with supervision and RW. Increased O2 from 1.5L to 3L for amb, and pt limited by SOB, requiring 2 brief standing rest breaks. Upon returning to room, O2 sats at 94% on 1.5L ~1min following amb. Pt is making good progress towards PT goals. Recommend d/c to SNF at this time.

## 2019-02-04 NOTE — PLAN OF CARE
Problem: Patient Care Overview  Goal: Plan of Care Review  Outcome: Ongoing (interventions implemented as appropriate)   02/04/19 0234   Coping/Psychosocial   Plan of Care Reviewed With patient   Plan of Care Review   Progress improving   OTHER   Outcome Summary PT anxious at beginning of shift; improved with Xanax. VSS. Tx to rehab when insurance approves.      Goal: Discharge Needs Assessment  Outcome: Ongoing (interventions implemented as appropriate)    Goal: Interprofessional Rounds/Family Conf  Outcome: Ongoing (interventions implemented as appropriate)      Problem: Fall Risk (Adult)  Goal: Identify Related Risk Factors and Signs and Symptoms  Outcome: Ongoing (interventions implemented as appropriate)    Goal: Absence of Fall  Outcome: Ongoing (interventions implemented as appropriate)      Problem: Skin Injury Risk (Adult)  Goal: Skin Health and Integrity  Outcome: Ongoing (interventions implemented as appropriate)      Problem: Cardiac: ACS (Acute Coronary Syndrome) (Adult)  Goal: Signs and Symptoms of Listed Potential Problems Will be Absent, Minimized or Managed (Cardiac: ACS)  Outcome: Ongoing (interventions implemented as appropriate)

## 2019-02-05 VITALS
HEART RATE: 68 BPM | OXYGEN SATURATION: 97 % | RESPIRATION RATE: 18 BRPM | BODY MASS INDEX: 25.01 KG/M2 | HEIGHT: 68 IN | TEMPERATURE: 98.2 F | SYSTOLIC BLOOD PRESSURE: 150 MMHG | DIASTOLIC BLOOD PRESSURE: 82 MMHG | WEIGHT: 165 LBS

## 2019-02-05 PROBLEM — R11.2 N&V (NAUSEA AND VOMITING): Status: RESOLVED | Noted: 2019-01-22 | Resolved: 2019-02-05

## 2019-02-05 PROCEDURE — 25010000002 HEPARIN (PORCINE) PER 1000 UNITS: Performed by: INTERNAL MEDICINE

## 2019-02-05 PROCEDURE — 99239 HOSP IP/OBS DSCHRG MGMT >30: CPT | Performed by: NURSE PRACTITIONER

## 2019-02-05 PROCEDURE — 63710000001 PREDNISONE PER 5 MG: Performed by: PHYSICIAN ASSISTANT

## 2019-02-05 PROCEDURE — 97535 SELF CARE MNGMENT TRAINING: CPT

## 2019-02-05 PROCEDURE — 94799 UNLISTED PULMONARY SVC/PX: CPT

## 2019-02-05 PROCEDURE — 97530 THERAPEUTIC ACTIVITIES: CPT

## 2019-02-05 RX ORDER — TRAMADOL HYDROCHLORIDE 50 MG/1
50 TABLET ORAL EVERY 8 HOURS PRN
Qty: 9 TABLET | Refills: 0 | Status: SHIPPED | OUTPATIENT
Start: 2019-02-05 | End: 2019-02-06 | Stop reason: SDUPTHER

## 2019-02-05 RX ORDER — ACETAMINOPHEN 325 MG/1
650 TABLET ORAL 4 TIMES DAILY PRN
Start: 2019-02-05 | End: 2019-08-31 | Stop reason: HOSPADM

## 2019-02-05 RX ORDER — ALPRAZOLAM 0.5 MG/1
0.5 TABLET ORAL NIGHTLY PRN
Qty: 3 TABLET | Refills: 0 | Status: SHIPPED | OUTPATIENT
Start: 2019-02-05 | End: 2019-02-06 | Stop reason: SDUPTHER

## 2019-02-05 RX ORDER — PSEUDOEPHEDRINE HCL 30 MG
100 TABLET ORAL 2 TIMES DAILY PRN
Start: 2019-02-05 | End: 2019-06-05

## 2019-02-05 RX ORDER — ALBUTEROL SULFATE 2.5 MG/3ML
2.5 SOLUTION RESPIRATORY (INHALATION)
Status: DISCONTINUED | OUTPATIENT
Start: 2019-02-05 | End: 2019-02-05 | Stop reason: HOSPADM

## 2019-02-05 RX ORDER — TOBRAMYCIN 3 MG/ML
1 SOLUTION/ DROPS OPHTHALMIC
Qty: 3 ML | Refills: 0
Start: 2019-02-05 | End: 2019-02-12

## 2019-02-05 RX ORDER — PREDNISONE 10 MG/1
TABLET ORAL
Start: 2019-02-05 | End: 2019-03-25 | Stop reason: SDUPTHER

## 2019-02-05 RX ORDER — PANTOPRAZOLE SODIUM 40 MG/1
40 TABLET, DELAYED RELEASE ORAL
Start: 2019-02-06 | End: 2019-03-25 | Stop reason: SDUPTHER

## 2019-02-05 RX ORDER — ALBUTEROL SULFATE 2.5 MG/3ML
2.5 SOLUTION RESPIRATORY (INHALATION)
Refills: 12
Start: 2019-02-05 | End: 2019-08-31 | Stop reason: HOSPADM

## 2019-02-05 RX ORDER — ATORVASTATIN CALCIUM 10 MG/1
10 TABLET, FILM COATED ORAL NIGHTLY
Start: 2019-02-05 | End: 2020-01-08 | Stop reason: SDUPTHER

## 2019-02-05 RX ORDER — ONDANSETRON 4 MG/1
4 TABLET, FILM COATED ORAL EVERY 6 HOURS PRN
Start: 2019-02-05 | End: 2019-06-05

## 2019-02-05 RX ORDER — ASCORBIC ACID 1000 MG
1 TABLET ORAL
Start: 2019-02-05 | End: 2019-03-08

## 2019-02-05 RX ADMIN — PANTOPRAZOLE SODIUM 40 MG: 40 TABLET, DELAYED RELEASE ORAL at 05:40

## 2019-02-05 RX ADMIN — ALBUTEROL SULFATE 2.5 MG: 2.5 SOLUTION RESPIRATORY (INHALATION) at 13:39

## 2019-02-05 RX ADMIN — SPIRONOLACTONE 25 MG: 25 TABLET ORAL at 09:14

## 2019-02-05 RX ADMIN — ESCITALOPRAM OXALATE 10 MG: 10 TABLET ORAL at 09:10

## 2019-02-05 RX ADMIN — METOCLOPRAMIDE 10 MG: 10 TABLET ORAL at 05:40

## 2019-02-05 RX ADMIN — TOBRAMYCIN 1 DROP: 3 SOLUTION/ DROPS OPHTHALMIC at 09:12

## 2019-02-05 RX ADMIN — SODIUM CHLORIDE, PRESERVATIVE FREE 3 ML: 5 INJECTION INTRAVENOUS at 09:15

## 2019-02-05 RX ADMIN — ACETAMINOPHEN 650 MG: 325 TABLET ORAL at 05:43

## 2019-02-05 RX ADMIN — CLOPIDOGREL BISULFATE 75 MG: 75 TABLET, FILM COATED ORAL at 09:11

## 2019-02-05 RX ADMIN — TOBRAMYCIN 1 DROP: 3 SOLUTION/ DROPS OPHTHALMIC at 05:40

## 2019-02-05 RX ADMIN — TOBRAMYCIN 1 DROP: 3 SOLUTION/ DROPS OPHTHALMIC at 12:30

## 2019-02-05 RX ADMIN — FINASTERIDE 5 MG: 5 TABLET, FILM COATED ORAL at 09:11

## 2019-02-05 RX ADMIN — BUDESONIDE AND FORMOTEROL FUMARATE DIHYDRATE 1 PUFF: 80; 4.5 AEROSOL RESPIRATORY (INHALATION) at 08:20

## 2019-02-05 RX ADMIN — ALBUTEROL SULFATE 2.5 MG: 2.5 SOLUTION RESPIRATORY (INHALATION) at 08:20

## 2019-02-05 RX ADMIN — ACETAMINOPHEN 650 MG: 325 TABLET ORAL at 11:00

## 2019-02-05 RX ADMIN — METOCLOPRAMIDE 10 MG: 10 TABLET ORAL at 11:00

## 2019-02-05 RX ADMIN — HEPARIN SODIUM 5000 UNITS: 5000 INJECTION INTRAVENOUS; SUBCUTANEOUS at 05:40

## 2019-02-05 RX ADMIN — METOPROLOL TARTRATE 25 MG: 25 TABLET ORAL at 09:10

## 2019-02-05 RX ADMIN — PREDNISONE 10 MG: 10 TABLET ORAL at 09:10

## 2019-02-05 RX ADMIN — ASPIRIN 81 MG: 81 TABLET, COATED ORAL at 09:11

## 2019-02-05 NOTE — PLAN OF CARE
Problem: Patient Care Overview  Goal: Plan of Care Review  Outcome: Ongoing (interventions implemented as appropriate)   02/05/19 5955   Coping/Psychosocial   Plan of Care Reviewed With patient   Plan of Care Review   Progress improving   OTHER   Outcome Summary Pt with improved standing tolerance for ADL tasks completed standing at sink side without AD, cues for PLB and activity pacing for energy conservation throughout ADL tasks, supervision for doffing front opening gown and donning pull over shirt maintaining pacemaker precautions; preparing for d/c to rehab today

## 2019-02-05 NOTE — THERAPY DISCHARGE NOTE
Acute Care - Physical Therapy Treatment Note/Discharge  [facility]     Patient Name: Jani Pena  : 1950  MRN: [id number]  Today's Date: 2019  Onset of Illness/Injury or Date of Surgery: 19  Date of Referral to PT: 19  Referring Physician: PAL Trejo    Admit Date: 2019    Visit Dx:    ICD-10-CM ICD-9-CM   1. Impaired functional mobility, balance, gait, and endurance Z74.09 V49.89   2. Oral phase dysphagia R13.11 787.21   3. Impaired mobility and ADLs Z74.09 799.89   4. Persistent atrial fibrillation (CMS/HCC) I48.1 427.31   5. Chronic systolic congestive heart failure (CMS/HCC) I50.22 428.22     428.0   6. Ventricular tachycardia (CMS/HCC) I47.2 427.1   7. Type 2 diabetes mellitus with complication, without long-term current use of insulin (CMS/[facility]) E11.8 250.90   8. Physical deconditioning R53.81 799.3   9. Nausea R11.0 787.02   10. Esophageal dysphagia R13.10 787.20     Patient Active Problem List   Diagnosis   • Anxiety   • Atrial fibrillation (CMS/HCC)   • Chronic kidney disease, stage III (moderate) (CMS/HCC)   • Steroid-dependent COPD (CMS/HCC)   • Degeneration of cervical intervertebral disc   • Type 2 diabetes mellitus with stage 3 chronic kidney disease (CMS/[facility])   • GERD without esophagitis   • Diverticulosis   • Hemorrhoids   • Hiatal hernia   • Hyperlipidemia   • Essential hypertension   • Kyphosis, acquired   • Sleep apnea   • Enlarged prostate without lower urinary tract symptoms (luts)   • History of arthritis   • Back pain   • Depression   • History of ventricular septal defect   • Impaired mobility and ADLs   • Chronic diastolic congestive heart failure (CMS/HCC)   • Coronary artery disease involving native coronary artery of native heart with angina pectoris (CMS/HCC)   • Lumbar radiculopathy   • Dysphagia   • Anemia   • N&V (nausea and vomiting)   • Scalp laceration   • Esophageal dysphagia   • Cardiac pacemaker in situ   • Sick sinus syndrome (CMS/HCC)        Physical Therapy Education     Title: PT OT SLP Therapies (Not Started)     Topic: Physical Therapy (In Progress)     Point: Mobility training (In Progress)     Learning Progress Summary           Patient Eager, E,D,H, NR by LUKASZ at 2/5/2019  1:19 PM    Acceptance, E, NR by  at 2/4/2019  2:19 PM    Comment:  Pt educated on POC, importance of OOB activity and safe functional mobility.    Acceptance, E, NR by AS at 2/1/2019  9:47 AM    Eager, E,D, NR by DM at 1/30/2019  1:26 PM    Acceptance, E, NR,VU by  at 1/28/2019 10:47 AM    Comment:  Pt educated on POC, importance of OOB activity and safe functional mobility.    Acceptance, E,D, NR by KRISTAL at 1/27/2019 10:24 AM    Eager, E,D, NR by LUKASZ at 1/23/2019 11:28 AM   Family Eager, E,D, NR by LUKASZ at 1/23/2019 11:28 AM                   Point: Home exercise program (In Progress)     Learning Progress Summary           Patient Eager, E,D,H, NR by LUKASZ at 2/5/2019  1:19 PM    Acceptance, E, NR by  at 2/4/2019  2:19 PM    Comment:  Pt educated on POC, importance of OOB activity and safe functional mobility.    Acceptance, E, NR by AS at 2/1/2019  9:47 AM    Eager, E,D, NR by LUKASZ at 1/30/2019  1:26 PM    Acceptance, E, NR,VU by  at 1/28/2019 10:47 AM    Comment:  Pt educated on POC, importance of OOB activity and safe functional mobility.    Acceptance, E,D, NR by KRISTAL at 1/27/2019 10:24 AM    Eager, E,D, NR by DM at 1/23/2019 11:28 AM   Family Eager, E,D, NR by LUKASZ at 1/23/2019 11:28 AM                   Point: Body mechanics (In Progress)     Learning Progress Summary           Patient Eager, E,D,H, NR by DM at 2/5/2019  1:19 PM    Acceptance, E, NR by  at 2/4/2019  2:19 PM    Comment:  Pt educated on POC, importance of OOB activity and safe functional mobility.    Acceptance, E, NR by AS at 2/1/2019  9:47 AM    AcceptanceCAROL ANN VU by MISHEL at 2/1/2019  1:24 AM    CAROL ANN Pollard D, NR by LUKASZ at 1/30/2019  1:26 PM    CAROL ANN Al NR, VU by  at 1/28/2019 10:47 AM    Comment:  Pt  educated on POC, importance of OOB activity and safe functional mobility.    Acceptance, E,D, NR by KRISTAL at 1/27/2019 10:24 AM    Eager, E,D, NR by DM at 1/23/2019 11:28 AM   Family Eager, E,D, NR by DM at 1/23/2019 11:28 AM                   Point: Precautions (In Progress)     Learning Progress Summary           Patient Eager, E,D,H, NR by DM at 2/5/2019  1:19 PM    Acceptance, E, NR by  at 2/4/2019  2:19 PM    Comment:  Pt educated on POC, importance of OOB activity and safe functional mobility.    Acceptance, E, NR by AS at 2/1/2019  9:47 AM    Eager, E,D, NR by DM at 1/30/2019  1:26 PM    Acceptance, E, NR,VU by  at 1/28/2019 10:47 AM    Comment:  Pt educated on POC, importance of OOB activity and safe functional mobility.    Acceptance, E,D, NR by KRISTAL at 1/27/2019 10:24 AM    Eager, E,D, NR by DM at 1/23/2019 11:28 AM   Family Eager, E,D, NR by DM at 1/23/2019 11:28 AM                               User Key     Initials Effective Dates Name Provider Type Discipline     06/19/15 -  Anna Ibanez, PT Physical Therapist PT    DM 06/19/15 -  Carmita Venegas, PT Physical Therapist PT    AS 06/22/15 -  Nicole Daniel, PTA Physical Therapy Assistant PT     06/16/16 -  Edouard Gómez, RN Registered Nurse Nurse     12/06/18 -  Mable Mcclain, PT Student PT Student PT              Rehab Goal Summary     Row Name 02/05/19 1217             Physical Therapy Goals    Bed Mobility Goal Selection (PT)  bed mobility, PT goal 1  -DM      Transfer Goal Selection (PT)  transfer, PT goal 1  -DM      Gait Training Goal Selection (PT)  gait training, PT goal 1  -DM      Stairs Goal Selection (PT)  stairs, PT goal 1  -DM         Bed Mobility Goal 1 (PT)    Activity/Assistive Device (Bed Mobility Goal 1, PT)  bed mobility activities, all  -DM      [place] Level/Cues Needed (Bed Mobility Goal 1, PT)  independent  -DM      Time Frame (Bed Mobility Goal 1, PT)  long term goal (LTG);1 week  -DM       Progress/Outcomes (Bed Mobility Goal 1, PT)  goal partially met  -DM         Transfer Goal 1 (PT)    Activity/Assistive Device (Transfer Goal 1, PT)  sit-to-stand/stand-to-sit;bed-to-chair/chair-to-bed  -DM      [place] Level/Cues Needed (Transfer Goal 1, PT)  independent  -DM      Time Frame (Transfer Goal 1, PT)  long term goal (LTG);1 week  -DM      Progress/Outcome (Transfer Goal 1, PT)  goal ongoing;good progress toward goal  -DM         Gait Training Goal 1 (PT)    Activity/Assistive Device (Gait Training Goal 1, PT)  gait (walking locomotion);assistive device use;walker, rolling stable VS;O2 SAT >= 92%;No further syncope/dizzy spells  -DM      [place] Level (Gait Training Goal 1, PT)  independent  -DM      Distance (Gait Goal 1, PT)  350  -DM      Time Frame (Gait Training Goal 1, PT)  long term goal (LTG);1 week  -DM      Progress/Outcome (Gait Training Goal 1, PT)  good progress toward goal;goal partially met  -DM         Stairs Goal 1 (PT)    Activity/Assistive Device (Stairs Goal 1, PT)  stairs, all skills;cane, straight  -DM      [place] Level/Cues Needed (Stairs Goal 1, PT)  independent  -DM      Number of Stairs (Stairs Goal 1, PT)  1  -DM      Time Frame (Stairs Goal 1, PT)  long term goal (LTG);1 week  -DM      Progress/Outcome (Stairs Goal 1, PT)  goal not met  -DM         Patient Education Goal (PT)    Activity (Patient Education Goal, PT)  HEP exer  -DM      [place]/Cues/Accuracy (Memory Goal 2, PT)  demonstrates adequately;verbalizes understanding  -DM      Time Frame (Patient Education Goal, PT)  long term goal (LTG);1 week  -DM      Progress/Outcome (Patient Education Goal, PT)  goal partially met  -DM        User Key  (r) = Recorded By, (t) = Taken By, (c) = Cosigned By    Initials Name Provider Type Discipline    Carmita Pablo, PT Physical Therapist PT        Therapy Treatment  Rehabilitation Treatment Summary     Row Name 02/05/19 1217             Treatment  Time/Intention    Discipline  physical therapist  -DM      Document Type  discharge treatment;therapy note (daily note)  -DM      Subjective Information  complains of;fatigue;pain;dyspnea  -DM      Mode of Treatment  individual therapy;physical therapy  -DM      Patient/Family Observations  EOB;o2;TELE;will d/c to [facility]  -DM      Care Plan Review  care plan/treatment goals reviewed;patient/other agree to care plan  -DM      Therapy Frequency (PT Clinical Impression)  daily  -DM      Patient Effort  good  -DM      Existing Precautions/Restrictions  fall;oxygen therapy device and L/min;pacemaker  -DM      Recorded by [DM] Carmita Venegas, PT 02/05/19 1306      Row Name 02/05/19 1217             Vital Signs    Pre Systolic BP Rehab  150  -DM      Pre Treatment Diastolic BP  82  -DM      Pretreatment Heart Rate (beats/min)  82 paced  -DM      Posttreatment Heart Rate (beats/min)  84  -DM      Pre SpO2 (%)  99  -DM      O2 Delivery Pre Treatment  supplemental O2 1.5 L  -DM      Post SpO2 (%)  -- 94  -DM      O2 Delivery Post Treatment  supplemental O2  -DM      Pre Patient Position  Sitting  -DM      Intra Patient Position  Standing  -DM      Post Patient Position  Supine respirex 500 cc  -DM      Recorded by [DM] Carmita Venegas, PT 02/05/19 1306      Row Name 02/05/19 1217             Cognitive Assessment/Intervention    Additional Documentation  Cognitive Assessment/Intervention (Group)  -DM      Recorded by [DM] Carmita Venegas, PT 02/05/19 1306      Row Name 02/05/19 1217             Cognitive Assessment/Intervention- PT/OT    Affect/Mental Status (Cognitive)  WFL  -DM      Orientation Status (Cognition)  oriented x 4  -DM      Follows Commands (Cognition)  WFL  -DM      Cognitive Function (Cognitive)  safety deficit  -DM      Safety Deficit (Cognitive)  mild deficit;impulsivity;insight into deficits/self awareness  -DM      Personal Safety Interventions  fall prevention program maintained;gait  belt;nonskid shoes/slippers when out of bed  -DM      Recorded by [DM] Carmita Venegas, PT 02/05/19 1306      Row Name 02/05/19 1217             Safety Issues, Functional Mobility    Impairments Affecting Function (Mobility)  balance;endurance/activity tolerance;shortness of breath;strength  -DM      Recorded by [DM] Carmita Venegas, PT 02/05/19 1306      Row Name 02/05/19 1217             Bed Mobility Assessment/Treatment    Bed Mobility Assessment/Treatment  scooting/bridging;sit-supine  -DM      Scooting/Bridging [place] (Bed Mobility)  conditional independence  -DM      Sit-Supine [place] (Bed Mobility)  conditional independence  -DM      Bed Mobility, Safety Issues  decreased use of arms for pushing/pulling  -DM      Assistive Device (Bed Mobility)  head of bed elevated  -DM      Recorded by [DM] Carmita Venegas, PT 02/05/19 1306      Row Name 02/05/19 1217             Transfer Assessment/Treatment    Transfer Assessment/Treatment  sit-stand transfer;stand-sit transfer  -DM      Comment (Transfers)  cues for HP  -DM      Recorded by [DM] Carmita Venegas, PT 02/05/19 1306      Row Name 02/05/19 1217             Sit-Stand Transfer    Sit-Stand [place] (Transfers)  supervision;verbal cues  -DM      Assistive Device (Sit-Stand Transfers)  other (see comments) GT BELT;no AD  -DM      Recorded by [DM] Carmita Venegas, PT 02/05/19 1306      Row Name 02/05/19 1217             Stand-Sit Transfer    Stand-Sit [place] (Transfers)  conditional independence;verbal cues  -DM      Assistive Device (Stand-Sit Transfers)  other (see comments) GT BELT;no AD  -DM      Recorded by [DM] Carmita Venegas, PT 02/05/19 1306      Row Name 02/05/19 1217             Gait/Stairs Assessment/Training    Gait/Stairs Assessment/Training  gait/ambulation independence  -DM      [place] Level (Gait)  contact guard  -DM      Assistive Device (Gait)  other (see comments) DECL R wx; gt belt & UE supp  -DM      Distance  in Feet (Gait)  10 inRoom;mod SOB/Anxious re:packingBelongings;ojdall1sxuheJxhm  -DM      Pattern (Gait)  step-through  -DM      Deviations/Abnormal Patterns (Gait)  pati decreased;stride length decreased;bilateral deviations  -DM      Bilateral Gait Deviations  forward flexed posture;heel strike decreased  -DM      Comment (Gait/Stairs)  cues for PLB  -DM      Recorded by [DM] Carmita Venegas, PT 02/05/19 1306      Row Name 02/05/19 1217             Motor Skills Assessment/Interventions    Additional Documentation  Therapeutic Exercise (Group);Therapeutic Exercise Interventions (Group)  -DM      Recorded by [DM] Carmita Venegas, PT 02/05/19 1306      Row Name 02/05/19 1217             Therapeutic Exercise    46904 - PT Therapeutic Activity Minutes  25  -DM      Recorded by [DM] Carmita Venegas, PT 02/05/19 1306      Row Name 02/05/19 1217             Therapeutic Exercise    Lower Extremity (Therapeutic Exercise)  LAQ (long arc quad), bilateral;marching while seated;quad sets, bilateral;SAQ (short arc quad), bilateral  -DM      Lower Extremity Range of Motion (Therapeutic Exercise)  hip abduction/adduction, bilateral;hip internal/external rotation, bilateral;ankle dorsiflexion/plantar flexion, bilateral  -DM      Exercise Type (Therapeutic Exercise)  AROM (active range of motion);isometric contraction, static  -DM      Position (Therapeutic Exercise)  seated;supine  -DM      Sets/Reps (Therapeutic Exercise)  1/10 1/5 QS  -DM      Recorded by [DM] Carmita Venegas, PT 02/05/19 1306      Row Name 02/05/19 1217             Balance    Balance  static sitting balance;static standing balance;dynamic sitting balance;dynamic standing balance  -DM      Recorded by [DM] Carmita Venegas, PT 02/05/19 1306      Row Name 02/05/19 1217             Static Sitting Balance    Level of [place] (Unsupported Sitting, Static Balance)  independent  -DM      Sitting Position (Unsupported Sitting, Static Balance)  sitting on  edge of bed  -DM      Time Able to Maintain Position (Unsupported Sitting, Static Balance)  more than 5 minutes  -DM      Recorded by [DM] Carmita Venegas, PT 02/05/19 1306      Row Name 02/05/19 1217             Dynamic Sitting Balance    Level of [place], Reaches Outside Midline (Sitting, Dynamic Balance)  independent  -DM      Sitting Position, Reaches Outside Midline (Sitting, Dynamic Balance)  sitting on edge of bed  -DM      Comment, Reaches Outside Midline (Sitting, Dynamic Balance)  LE exer/scooting/PLB  -DM      Recorded by [DM] Carmita Venegas, PT 02/05/19 1306      Row Name 02/05/19 1217             Static Standing Balance    Level of [place] (Supported Standing, Static Balance)  supervision  -DM      Time Able to Maintain Position (Supported Standing, Static Balance)  3 to 4 minutes  -DM      Assistive Device Utilized (Supported Standing, Static Balance)  other (see comments) GT BELT  -DM      Comment (Supported Standing, Static Balance)  PLB;respirex  -DM      Recorded by [DM] Carmita Venegas, PT 02/05/19 1306      Row Name 02/05/19 1217             Dynamic Standing Balance    Level of [place], Reaches Outside Midline (Standing, Dynamic Balance)  supervision  -DM      Time Able to Maintain Position, Reaches Outside Midline (Standing, Dynamic Balance)  1 to 2 minutes  -DM      Assistive Device Utilized (Supported Standing, Dynamic Balance)  other (see comments) gt belt  -DM      Comment, Reaches Outside Midline (Standing, Dynamic Balance)  side steps while packing belongings/clearing sink area  -DM      Recorded by [DM] Carmita Venegas, PT 02/05/19 1306      Row Name 02/05/19 1217             Positioning and Restraints    Pre-Treatment Position  in bed  -DM      Post Treatment Position  bed  -DM      In Bed  notified nsg;fowlers;call light within reach;encouraged to call for assist;with nsg  -DM      Recorded by [DM] Carmita Venegas, PT 02/05/19 1306      Row Name 02/05/19 1217              Pain Assessment    Additional Documentation  Pain Scale: Numbers Pre/Post-Treatment (Group)  -DM      Recorded by [DM] Carmita Venegas, PT 02/05/19 1306      Row Name 02/05/19 1217             Pain Scale: Numbers Pre/Post-Treatment    Pain Scale: Numbers, Pretreatment  0/10 - no pain  -DM      Pain Scale: Numbers, Post-Treatment  0/10 - no pain  -DM      Recorded by [DM] Carmita Venegas, PT 02/05/19 1306      Row Name                Wound 01/21/19 2000 Left posterior head laceration    Wound - Properties Group Date first assessed: 01/21/19 [PAUL] Time first assessed: 2000 [PAUL] Present On Admission : yes [PAUL] Side: Left [PAUL] Orientation: posterior [PAUL] Location: head [PAUL] Type: laceration [PAUL] Number of Staples Placed: 6 [KC2] Recorded by:  [PAUL] Cyn Jaquez RN 01/22/19 0432 [KC2] Cyn Jaquez RN 01/22/19 0435    Row Name                Wound 01/22/19 2008 Left elbow abrasion    Wound - Properties Group Date first assessed: 01/22/19 [CC] Time first assessed: 2008 [CC] Present On Admission : yes [CC] Side: Left [CC] Location: elbow [CC] Type: abrasion [CC] Recorded by:  [CC] Erica Lam RN 01/22/19 3632    Row Name                Wound 01/26/19 2000 coccyx other (see comments)    Wound - Properties Group Date first assessed: 01/26/19 [LL] Time first assessed: 2000 [LL] Present On Admission : no [LL] Location: coccyx [LL] Type: other (see comments) [LL], RED PRESSURE AREA  Additional Comments: BILATERAL MEDIAL, UPPER ISCHIAL AND SACRO-COCCYGEAL [LL] Recorded by:  [LL] Tran Page RN 01/27/19 0048    Row Name                Wound 01/31/19 0939 Left upper chest incision;surgical    Wound - Properties Group Date first assessed: 01/31/19 [LLA] Time first assessed: 0939 [LLA] Present On Admission : no [LLA] Side: Left [LLA] Orientation: upper [LLA] Location: chest [LLA] Type: incision;surgical [LLA] Recorded by:  [LLA] Janice Montelongo RN 01/31/19 0940    Row Name 02/05/19 1217             Plan of  Care Review    Plan of Care Reviewed With  patient  -DM      Recorded by [DM] Carmita Venegas, PT 02/05/19 1306      Row Name 02/05/19 1217             Outcome Summary/Treatment Plan (PT)    Daily Summary of Progress (PT)  progress toward functional goals is good  -DM      Anticipated Discharge Disposition (PT)  skilled nursing facility  -DM      Recorded by [DM] Camrita Venegas, PT 02/05/19 1306        User Key  (r) = Recorded By, (t) = Taken By, (c) = Cosigned By    Initials Name Effective Dates Discipline    DM Carmita Venegas, PT 06/19/15 -  PT    Janice Nicholas, RN 06/16/16 -  Nurse    Erica Oneil RN 06/16/16 -  Nurse    LL Tran Page RN 06/16/16 -  Nurse    Cyn Leo RN 02/22/17 -  Nurse        Wound 01/21/19 2000 Left posterior head laceration (Active)   Closure Open to air 2/5/2019  9:10 AM   Periwound ecchymotic 2/5/2019  9:10 AM   Drainage Amount none 2/5/2019  9:10 AM       Wound 01/22/19 2008 Left elbow abrasion (Active)   Closure Open to air 2/5/2019  9:10 AM   Base scab 2/5/2019  9:10 AM   Periwound dry;redness 2/4/2019  4:00 PM   Drainage Amount none 2/5/2019  9:10 AM       Wound 01/31/19 0939 Left upper chest incision;surgical (Active)   Dressing Appearance dry;intact;no drainage 2/5/2019  9:10 AM   Closure Adhesive closure strips 2/5/2019  9:10 AM   Drainage Amount none 2/5/2019  9:10 AM       PT Recommendation and Plan  Anticipated Discharge Disposition (PT): skilled nursing facility  Planned Therapy Interventions (PT Eval): bed mobility training, gait training, home exercise program, patient/family education, stair training, strengthening, transfer training  Therapy Frequency (PT Clinical Impression): daily  Outcome Summary/Treatment Plan (PT)  Daily Summary of Progress (PT): progress toward functional goals is good  Anticipated Discharge Disposition (PT): skilled nursing facility  Plan of Care Reviewed With: patient  Progress: improving  Outcome Summary: Able to amb  total of 10 ft w/ CGA & performed ther ex per HEP; LIMITED by anx/MOD SOB/fatigue; goals partially met; will  d/c to [facility] this PM    Outcome Measures     Row Name 02/05/19 1217 02/04/19 1434 02/04/19 0922       How much help from another person do you currently need...    Turning from your back to your side while in flat bed without using bedrails?  4  -DM  4  -DM (r) JR (t) DM (c)  --    Moving from lying on back to sitting on the side of a flat bed without bedrails?  4  -DM  4  -DM (r) JR (t) DM (c)  --    Moving to and from a bed to a chair (including a wheelchair)?  3  -DM  3  -DM (r) JR (t) DM (c)  --    Standing up from a chair using your arms (e.g., wheelchair, bedside chair)?  4  -DM  4  -DM (r) JR (t) DM (c)  --    Climbing 3-5 steps with a railing?  3  -DM  3  -DM (r) JR (t) DM (c)  --    To walk in hospital room?  3  -DM  3  -DM (r) JR (t) DM (c)  --    AM-PAC 6 Clicks Score  21  -DM  21  -DM (r) JR (t)  --       How much help from another is currently needed...    Putting on and taking off regular lower body clothing?  --  --  3  -KF    Bathing (including washing, rinsing, and drying)  --  --  3  -KF    Toileting (which includes using toilet bed pan or urinal)  --  --  4  -KF    Putting on and taking off regular upper body clothing  --  --  4  -KF    Taking care of personal grooming (such as brushing teeth)  --  --  4  -KF    Eating meals  --  --  4  -KF    Score  --  --  22  -KF       Functional Assessment    Outcome Measure Options  AM-PAC 6 Clicks Basic Mobility (PT)  -DM  AM-PAC 6 Clicks Basic Mobility (PT)  -DM (r) JR (t) DM (c)  --  -KF      User Key  (r) = Recorded By, (t) = Taken By, (c) = Cosigned By    Initials Name Provider Type    DM Carmita Venegas, PT Physical Therapist    Alona Decker, OT Occupational Therapist    Mable Hendrix, PT Student PT Student           Time Calculation:   PT Charges     Row Name 02/05/19 1321 02/05/19 1217          Time Calculation    Start Time  1217   -DM  --     PT Received On  02/05/19  -DM  --     PT Goal Re-Cert Due Date  02/14/19  -DM  --        Time Calculation- PT    Total Timed Code Minutes- PT  25 minute(s)  -DM  --        Timed Charges    11340 - PT Therapeutic Activity Minutes  --  25  -DM       User Key  (r) = Recorded By, (t) = Taken By, (c) = Cosigned By    Initials Name Provider Type    DM Carmita Venegas, PT Physical Therapist        Therapy Suggested Charges     Code   Minutes Charges    78184 (CPT®) Hc Pt Neuromusc Re Education Ea 15 Min      10525 (CPT®) Hc Pt Ther Proc Ea 15 Min      44459 (CPT®) Hc Gait Training Ea 15 Min      72889 (CPT®) Hc Pt Therapeutic Act Ea 15 Min 25 2    51694 (CPT®) Hc Pt Manual Therapy Ea 15 Min      11230 (CPT®) Hc Pt Iontophoresis Ea 15 Min      33373 (CPT®) Hc Pt Elec Stim Ea-Per 15 Min      95932 (CPT®) Hc Pt Ultrasound Ea 15 Min      54490 (CPT®) Hc Pt Self Care/Mgmt/Train Ea 15 Min      57268 (CPT®) Hc Pt Prosthetic (S) Train Initial Encounter, Each 15 Min      47314 (CPT®) Hc Pt Orthotic(S)/Prosthetic(S) Encounter, Each 15 Min      00250 (CPT®) Hc Orthotic(S) Mgmt/Train Initial Encounter, Each 15min      Total  25 2          Therapy Charges for Today     Code Description Service Date Service Provider Modifiers Qty    37093215897 HC PT THERAPEUTIC ACT EA 15 MIN 2/5/2019 Carmita Venegas, PT GP 2          PT G-Codes  Outcome Measure Options: AM-PAC 6 Clicks Basic Mobility (PT)  AM-PAC 6 Clicks Score: 21  Score: 22    PT Discharge Summary  Anticipated Discharge Disposition (PT): skilled nursing facility  Reason for Discharge: Discharge from facility  Outcomes Achieved: Patient able to partially acheive established goals  Discharge Destination: SNF    Carmita Venegas, PT  2/5/2019

## 2019-02-05 NOTE — PLAN OF CARE
Problem: Patient Care Overview  Goal: Plan of Care Review  Outcome: Unable to achieve outcome(s) by discharge Date Met: 02/05/19 02/05/19 1318   Coping/Psychosocial   Plan of Care Reviewed With patient   Plan of Care Review   Progress improving   OTHER   Outcome Summary Able to amb total of 10 ft w/ CGA & performed ther ex per HEP; LIMITED by anx/MOD SOB/fatigue; goals partially met; will d/c to [facility] this PM

## 2019-02-05 NOTE — PLAN OF CARE
Problem: Patient Care Overview  Goal: Plan of Care Review  Outcome: Ongoing (interventions implemented as appropriate)   02/05/19 0258   Coping/Psychosocial   Plan of Care Reviewed With patient   Plan of Care Review   Progress improving   OTHER   Outcome Summary PT rested well. VSS. No new complaints. Plan to tx to rehab today.      Goal: Individualization and Mutuality  Outcome: Ongoing (interventions implemented as appropriate)    Goal: Discharge Needs Assessment  Outcome: Ongoing (interventions implemented as appropriate)    Goal: Interprofessional Rounds/Family Conf  Outcome: Ongoing (interventions implemented as appropriate)      Problem: Fall Risk (Adult)  Goal: Identify Related Risk Factors and Signs and Symptoms  Outcome: Ongoing (interventions implemented as appropriate)      Problem: Skin Injury Risk (Adult)  Goal: Skin Health and Integrity  Outcome: Ongoing (interventions implemented as appropriate)      Problem: Cardiac: ACS (Acute Coronary Syndrome) (Adult)  Goal: Signs and Symptoms of Listed Potential Problems Will be Absent, Minimized or Managed (Cardiac: ACS)  Outcome: Ongoing (interventions implemented as appropriate)

## 2019-02-05 NOTE — THERAPY DISCHARGE NOTE
Acute Care - Occupational Therapy Treatment Note/Discharge  [facility]     Patient Name: Jani Pena  : 1950  MRN: [id number]  Today's Date: 2019  Onset of Illness/Injury or Date of Surgery: 19  Date of Referral to OT: 19  Referring Physician: PAL Trejo      Admit Date: 2019    Visit Dx:     ICD-10-CM ICD-9-CM   1. Impaired functional mobility, balance, gait, and endurance Z74.09 V49.89   2. Oral phase dysphagia R13.11 787.21   3. Impaired mobility and ADLs Z74.09 799.89   4. Persistent atrial fibrillation (CMS/[facility]) I48.1 427.31   5. Chronic systolic congestive heart failure (CMS/[facility]) I50.22 428.22     428.0   6. Ventricular tachycardia (CMS/[facility]) I47.2 427.1   7. Type 2 diabetes mellitus with complication, without long-term current use of insulin (CMS/[facility]) E11.8 250.90   8. Physical deconditioning R53.81 799.3   9. Nausea R11.0 787.02   10. Esophageal dysphagia R13.10 787.20   11. Type 2 diabetes mellitus with stage 3 chronic kidney disease, without long-term current use of insulin (CMS/[facility]) E11.22 250.40    N18.3 585.3     Patient Active Problem List   Diagnosis   • Anxiety   • Atrial fibrillation (CMS/[facility])   • Chronic kidney disease, stage III (moderate) (CMS/[facility])   • Steroid-dependent COPD (CMS/[facility])   • Degeneration of cervical intervertebral disc   • Type 2 diabetes mellitus with stage 3 chronic kidney disease (CMS/[facility])   • GERD without esophagitis   • Diverticulosis   • Hemorrhoids   • Hiatal hernia   • Hyperlipidemia   • Essential hypertension   • Kyphosis, acquired   • Sleep apnea   • Enlarged prostate without lower urinary tract symptoms (luts)   • History of arthritis   • Back pain   • Depression   • History of ventricular septal defect   • Impaired mobility and ADLs   • Chronic diastolic congestive heart failure (CMS/HCC)   • Coronary artery disease involving native coronary artery of native heart with angina pectoris (CMS/HCC)   • Lumbar radiculopathy   • Dysphagia    • Anemia   • Scalp laceration   • Esophageal dysphagia   • Cardiac pacemaker in situ   • Sick sinus syndrome (CMS/HCC)       Therapy Treatment    Rehabilitation Treatment Summary     Row Name 02/05/19 1400 02/05/19 1217          Treatment Time/Intention    Discipline  occupational therapist  -KF  physical therapist  -DM     Document Type  therapy note (daily note);discharge treatment  -KF  discharge treatment;therapy note (daily note)  -DM     Subjective Information  complains of;weakness;fatigue  -KF  complains of;fatigue;pain;dyspnea  -DM     Mode of Treatment  individual therapy;occupational therapy  -KF  individual therapy;physical therapy  -DM     Patient/Family Observations  family present, tele off, NC throughout, preparing for d/c  -KF  EOB;o2;TELE;will d/c to [facility] [place] [facility]  -DM     Care Plan Review  evaluation/treatment results reviewed;care plan/treatment goals reviewed;risks/benefits reviewed;current/potential barriers reviewed;patient/other agree to care plan  -KF  care plan/treatment goals reviewed;patient/other agree to care plan  -DM     Care Plan Review, Other Participant(s)  family  -KF  --     Therapy Frequency (PT Clinical Impression)  --  daily  -DM     Patient Effort  good  -KF  good  -DM     Existing Precautions/Restrictions  fall;oxygen therapy device and L/min;pacemaker  -KF  fall;oxygen therapy device and L/min;pacemaker  -DM     Treatment Considerations/Comments  PPM precautions maintained throughout   -KF  --     Patient Response to Treatment  tolerated  -KF  --     Recorded by [KF] Alona Hernandez, OT 02/05/19 1424 [DM] Carmita Venegas, PT 02/05/19 1306     Row Name 02/05/19 1400 02/05/19 1217          Vital Signs    Pre Systolic BP Rehab  -- RN cleared vitals stable   -KF  150  -DM     Pre Treatment Diastolic BP  --  82  -DM     Pretreatment Heart Rate (beats/min)  --  82 paced  -DM     Posttreatment Heart Rate (beats/min)  --  84  -DM     Pre SpO2 (%)  --  99  -DM     O2  Delivery Pre Treatment  supplemental O2  -KF  supplemental O2 1.5 L  -DM     O2 Delivery Intra Treatment  supplemental O2  -KF  --     Post SpO2 (%)  --  -- 94  -DM     O2 Delivery Post Treatment  supplemental O2  -KF  supplemental O2  -DM     Pre Patient Position  Supine  -KF  Sitting  -DM     Intra Patient Position  Standing  -KF  Standing  -DM     Post Patient Position  Supine  -KF  Supine respirex 500 cc  -DM     Rest Breaks   1  -KF  --     Recorded by [KF] Alona Hernandez, OT 02/05/19 1424 [DM] Carmita Venegas, PT 02/05/19 1306     Row Name 02/05/19 1400 02/05/19 1217          Cognitive Assessment/Intervention    Additional Documentation  Cognitive Assessment/Intervention (Group)  -KF  Cognitive Assessment/Intervention (Group)  -DM     Recorded by [KF] Alona Hernandez, OT 02/05/19 1424 [DM] Carmita Venegas, PT 02/05/19 1306     Row Name 02/05/19 1400 02/05/19 1217          Cognitive Assessment/Intervention- PT/OT    Affect/Mental Status (Cognitive)  WFL  -KF  WFL  -DM     Orientation Status (Cognition)  oriented x 4  -KF  oriented x 4  -DM     Follows Commands (Cognition)  follows one step commands;over 90% accuracy;verbal cues/prompting required;repetition of directions required  -KF  WFL  -DM     Cognitive Function (Cognitive)  safety deficit  -KF  safety deficit  -DM     Safety Deficit (Cognitive)  mild deficit;awareness of need for assistance;safety precautions awareness  -KF  mild deficit;impulsivity;insight into deficits/self awareness  -DM     Cognitive Interventions (Cognitive)  occupation/activity based interventions;process/task specific training  -KF  --     Personal Safety Interventions  fall prevention program maintained;gait belt;muscle strengthening facilitated;nonskid shoes/slippers when out of bed  -KF  fall prevention program maintained;gait belt;nonskid shoes/slippers when out of bed  -DM     Recorded by [KF] Alona Hernandez, OT 02/05/19 1424 [DM] Carmita Venegas, PT 02/05/19  1306     Row Name 02/05/19 1400 02/05/19 1217          Safety Issues, Functional Mobility    Safety Issues Affecting Function (Mobility)  awareness of need for assistance;insight into deficits/self awareness;safety precaution awareness  -KF  --     Impairments Affecting Function (Mobility)  balance;endurance/activity tolerance;strength  -KF  balance;endurance/activity tolerance;shortness of breath;strength  -DM     Comment, Safety Issues/Impairments (Mobility)  anxious   -KF  --     Recorded by [KF] Alona Hernandez, OT 02/05/19 1424 [DM] Carmita Venegas, PT 02/05/19 1306     Row Name 02/05/19 1400 02/05/19 1217          Bed Mobility Assessment/Treatment    Bed Mobility Assessment/Treatment  rolling left;scooting/bridging;supine-sit;sit-supine  -KF  scooting/bridging;sit-supine  -DM     Rolling Left [place] (Bed Mobility)  supervision;verbal cues  -KF  --     Scooting/Bridging [place] (Bed Mobility)  conditional independence  -KF  conditional independence  -DM     Supine-Sit [place] (Bed Mobility)  conditional independence  -KF  --     Sit-Supine [place] (Bed Mobility)  supervision;verbal cues  -KF  conditional independence  -DM     Bed Mobility, Safety Issues  decreased use of arms for pushing/pulling  -KF  decreased use of arms for pushing/pulling  -DM     Assistive Device (Bed Mobility)  head of bed elevated  -KF  head of bed elevated  -DM     Comment (Bed Mobility)  cues for adhering to PPM precautions with returning to supin e  -KF  --     Recorded by [KF] Alona Hernandez, OT 02/05/19 1424 [DM] Carmita Venegas, PT 02/05/19 1306     Row Name 02/05/19 1400             Functional Mobility    Functional Mobility- Ind. Level  standby assist  -KF      Functional Mobility-Distance (Feet)  3  -KF      Functional Mobility- Safety Issues  supplemental O2  -KF      Recorded by [KF] Alona Hernandez, OT 02/05/19 1424      Row Name 02/05/19 1400 02/05/19 1217          Transfer  Assessment/Treatment    Transfer Assessment/Treatment  sit-stand transfer;stand-sit transfer  -KF  sit-stand transfer;stand-sit transfer  -DM     Comment (Transfers)  cues for activity pacing and incorporation of PLB into ADL functional tasks  -KF  cues for HP  -DM     Recorded by [KF] Alona Hernandez, OT 02/05/19 1424 [DM] Carmita Venegas, PT 02/05/19 1306     Row Name 02/05/19 1400 02/05/19 1217          Sit-Stand Transfer    Sit-Stand [place] (Transfers)  supervision;verbal cues  -KF  supervision;verbal cues  -DM     Assistive Device (Sit-Stand Transfers)  --  other (see comments) GT BELT;no AD  -DM     Recorded by [KF] Alona Hernandez, OT 02/05/19 1424 [DM] Carmita Venegas, PT 02/05/19 1306     Row Name 02/05/19 1400 02/05/19 1217          Stand-Sit Transfer    Stand-Sit [place] (Transfers)  supervision;verbal cues  -KF  conditional independence;verbal cues  -DM     Assistive Device (Stand-Sit Transfers)  --  other (see comments) GT BELT;no AD  -DM     Recorded by [KF] Alona Hernandez, OT 02/05/19 1424 [DM] Carmita Venegas, PT 02/05/19 1306     Row Name 02/05/19 1217             Gait/Stairs Assessment/Training    Gait/Stairs Assessment/Training  gait/ambulation independence  -DM      [place] Level (Gait)  contact guard  -DM      Assistive Device (Gait)  other (see comments) DECL R wx; gt belt & UE supp  -DM      Distance in Feet (Gait)  10 inRoom;mod SOB/Anxious re:packingBelongings;[id number]  -DM      Pattern (Gait)  step-through  -DM      Deviations/Abnormal Patterns (Gait)  pati decreased;stride length decreased;bilateral deviations  -DM      Bilateral Gait Deviations  forward flexed posture;heel strike decreased  -DM      Comment (Gait/Stairs)  cues for PLB  -DM      Recorded by [DM] Carmita Venegas, PT 02/05/19 1306      Row Name 02/05/19 1400             ADL Assessment/Intervention    10164 - OT Self Care/Mgmt Minutes  10  -KF      BADL Assessment/Intervention  upper  body dressing;grooming  -KF      Recorded by [KF] Alona Hernandez, OT 02/05/19 1424      Row Name 02/05/19 1400             Upper Body Dressing Assessment/Training    Upper Body Dressing [place] Level  front opening garment;doff;don;pull-over garment;supervision;verbal cues  -KF      Upper Body Dressing Position  edge of bed sitting  -KF      Comment (Upper Body Dressing)  cues and ed for PPM   -KF      Recorded by [KF] Alona Hernandez, OT 02/05/19 1424      Row Name 02/05/19 1400             Lower Body Dressing Assessment/Training    Lower Body Dressing [place] Level  don;shoes/slippers;set up  -KF      Lower Body Dressing Position  edge of bed sitting  -KF      Comment (Lower Body Dressing)  slip on shoes donned at EOB with set up   -KF      Recorded by [KF] Alona Hernandez, OT 02/05/19 1424      Row Name 02/05/19 1400             Grooming Assessment/Training    [place] Level (Grooming)  hair care, combing/brushing;supervision;verbal cues  -KF      Grooming Position  sink side;unsupported standing  -KF      Comment (Grooming)  also completed ADL grooming organizational tasks in standing reaching across midline   -KF      Recorded by [KF] Alona Hernandez, OT 02/05/19 1424      Row Name 02/05/19 1400             BADL Safety/Performance    Impairments, BADL Safety/Performance  balance;endurance/activity tolerance;shortness of breath;strength  -KF      Skilled BADL Treatment/Intervention  energy conservation;BADL process/adaptation training  -KF      Progress in BADL Status  improvement noted  -KF      Recorded by [KF] Alona Hernandez, OT 02/05/19 1424      Row Name 02/05/19 1400 02/05/19 1217          Motor Skills Assessment/Interventions    Additional Documentation  Balance (Group);Balance Interventions (Group)  -KF  Therapeutic Exercise (Group);Therapeutic Exercise Interventions (Group)  -DM     Recorded by [KF] Alona Hernandez, OT 02/05/19 1424 [DM] Carmita Venegas, PT 02/05/19  1306     Row Name 02/05/19 1217             Therapeutic Exercise    97959 - PT Therapeutic Activity Minutes  25  -DM      Recorded by [DM] Carmita Venegas, PT 02/05/19 1306      Row Name 02/05/19 1217             Therapeutic Exercise    Lower Extremity (Therapeutic Exercise)  LAQ (long arc quad), bilateral;marching while seated;quad sets, bilateral;SAQ (short arc quad), bilateral  -DM      Lower Extremity Range of Motion (Therapeutic Exercise)  hip abduction/adduction, bilateral;hip internal/external rotation, bilateral;ankle dorsiflexion/plantar flexion, bilateral  -DM      Exercise Type (Therapeutic Exercise)  AROM (active range of motion);isometric contraction, static  -DM      Position (Therapeutic Exercise)  seated;supine  -DM      Sets/Reps (Therapeutic Exercise)  1/10 1/5 QS  -DM      Recorded by [DM] Carmita Venegas, PT 02/05/19 1306      Row Name 02/05/19 1400 02/05/19 1217          Balance    Balance  static sitting balance;static standing balance;dynamic sitting balance;dynamic standing balance  -KF  static sitting balance;static standing balance;dynamic sitting balance;dynamic standing balance  -DM     Recorded by [KF] Alona Hernandez, OT 02/05/19 1424 [DM] Carmita Venegas, PT 02/05/19 1306     Row Name 02/05/19 1400 02/05/19 1217          Static Sitting Balance    Level of [place] (Unsupported Sitting, Static Balance)  independent  -KF  independent  -DM     Sitting Position (Unsupported Sitting, Static Balance)  sitting on edge of bed  -KF  sitting on edge of bed  -DM     Time Able to Maintain Position (Unsupported Sitting, Static Balance)  more than 5 minutes  -KF  more than 5 minutes  -DM     Recorded by [KF] Alona Hernandez, OT 02/05/19 1424 [DM] Carmita Venegas, PT 02/05/19 1306     Row Name 02/05/19 1400 02/05/19 1217          Dynamic Sitting Balance    Level of [place], Reaches Outside Midline (Sitting, Dynamic Balance)  independent  -KF  independent  -DM     Sitting Position,  Reaches Outside Midline (Sitting, Dynamic Balance)  sitting on edge of bed  -KF  sitting on edge of bed  -DM     Comment, Reaches Outside Midline (Sitting, Dynamic Balance)  reaching for ADL items from floor level  -KF  LE exer/scooting/PLB  -DM     Recorded by [KF] Alona Hernandez, OT 02/05/19 1424 [DM] Carmita Venegas, PT 02/05/19 1306     Row Name 02/05/19 1400 02/05/19 1217          Static Standing Balance    Level of [place] (Supported Standing, Static Balance)  supervision  -KF  supervision  -DM     Time Able to Maintain Position (Supported Standing, Static Balance)  3 to 4 minutes  -KF  3 to 4 minutes  -DM     Assistive Device Utilized (Supported Standing, Static Balance)  --  other (see comments) GT BELT  -DM     Comment (Supported Standing, Static Balance)  no AD, PLB incorporated  -KF  PLB;respirex  -DM     Recorded by [KF] Alona Hernandez, OT 02/05/19 1424 [DM] Carmita Venegas, PT 02/05/19 1306     Row Name 02/05/19 1400 02/05/19 1217          Dynamic Standing Balance    Level of [place], Reaches Outside Midline (Standing, Dynamic Balance)  supervision  -KF  supervision  -DM     Time Able to Maintain Position, Reaches Outside Midline (Standing, Dynamic Balance)  2 to 3 minutes  -KF  1 to 2 minutes  -DM     Assistive Device Utilized (Supported Standing, Dynamic Balance)  --  other (see comments) gt belt  -DM     Comment, Reaches Outside Midline (Standing, Dynamic Balance)  ADL tasks sink side no AD  -KF  side steps while packing belongings/clearing sink area  -DM     Recorded by [KF] Alona Hernandez, OT 02/05/19 1424 [DM] Carmita Venegas, PT 02/05/19 1306     Row Name 02/05/19 1400 02/05/19 1217          Positioning and Restraints    Pre-Treatment Position  in bed  -KF  in bed  -DM     Post Treatment Position  bed  -KF  bed  -DM     In Bed  notified nsg;supine;fowlers;call light within reach;with family/caregiver;encouraged to call for assist;legs elevated  -KF  notified  nsg;fowlers;call light within reach;encouraged to call for assist;with nsg  -DM     Recorded by [KF] Alona Hernandez, OT 02/05/19 1424 [DM] Carmita Venegas, PT 02/05/19 1306     Row Name 02/05/19 1400 02/05/19 1217          Pain Assessment    Additional Documentation  Pain Scale: Numbers Pre/Post-Treatment (Group)  -KF  Pain Scale: Numbers Pre/Post-Treatment (Group)  -DM     Recorded by [KF] Alona Hernandez, OT 02/05/19 1424 [DM] Carmita Venegas, PT 02/05/19 1306     Row Name 02/05/19 1400 02/05/19 1217          Pain Scale: Numbers Pre/Post-Treatment    Pain Scale: Numbers, Pretreatment  0/10 - no pain  -KF  0/10 - no pain  -DM     Pain Scale: Numbers, Post-Treatment  0/10 - no pain  -KF  0/10 - no pain  -DM     Pre/Post Treatment Pain Comment  tolerated  -KF  --     Pain Intervention(s)  Repositioned;Ambulation/increased activity  -KF  --     Recorded by [KF] Alona Hernandez, OT 02/05/19 1424 [DM] Carmiat Venegas, PT 02/05/19 1306     Row Name                Wound 01/21/19 2000 Left posterior head laceration    Wound - Properties Group Date first assessed: 01/21/19 [PAUL] Time first assessed: 2000 [PAUL] Present On Admission : yes [PAUL] Side: Left [PAUL] Orientation: posterior [PAUL] Location: head [PAUL] Type: laceration [PAUL] Number of Staples Placed: 6 [KC2] Recorded by:  [PAUL] Cyn Jaquez RN 01/22/19 0432 [KC2] Cyn Jaquez RN 01/22/19 0435    Row Name                Wound 01/22/19 2008 Left elbow abrasion    Wound - Properties Group Date first assessed: 01/22/19 [CC] Time first assessed: 2008 [CC] Present On Admission : yes [CC] Side: Left [CC] Location: elbow [CC] Type: abrasion [CC] Recorded by:  [CC] Erica Lam RN 01/22/19 5632    Row Name                Wound 01/26/19 2000 coccyx other (see comments)    Wound - Properties Group Date first assessed: 01/26/19 [LL] Time first assessed: 2000 [LL] Present On Admission : no [LL] Location: coccyx [LL] Type: other (see comments) [LL], RED PRESSURE  AREA  Additional Comments: BILATERAL MEDIAL, UPPER ISCHIAL AND SACRO-COCCYGEAL [LL] Recorded by:  [LL] Tran Page, RN 01/27/19 0048    Row Name                Wound 01/31/19 0939 Left upper chest incision;surgical    Wound - Properties Group Date first assessed: 01/31/19 [LLA] Time first assessed: 0939 [LLA] Present On Admission : no [LLA] Side: Left [LLA] Orientation: upper [LLA] Location: chest [LLA] Type: incision;surgical [LLA] Recorded by:  [LLA] Janice Montelongo RN 01/31/19 0940    Row Name 02/05/19 1400 02/05/19 1217          Plan of Care Review    Plan of Care Reviewed With  patient  -KF  patient  -DM     Recorded by [KF] Alona Hernandez, OT 02/05/19 1424 [DM] Carmita Venegas, PT 02/05/19 1306     Row Name 02/05/19 1400             Outcome Summary/Treatment Plan (OT)    Daily Summary of Progress (OT)  progress toward functional goals as expected  -KF      Recorded by [KF] Alona Hernandez, OT 02/05/19 1424      Row Name 02/05/19 1217             Outcome Summary/Treatment Plan (PT)    Daily Summary of Progress (PT)  progress toward functional goals is good  -DM      Anticipated Discharge Disposition (PT)  skilled nursing facility  -DM      Recorded by [DM] Carmita Venegas, PT 02/05/19 1306        User Key  (r) = Recorded By, (t) = Taken By, (c) = Cosigned By    Initials Name Effective Dates Discipline    DM Carmita Venegas, PT 06/19/15 -  PT    Janice Nicholas, RN 06/16/16 -  Nurse    Erica Oneil RN 06/16/16 -  Nurse    Tran Peck RN 06/16/16 -  Nurse    Cyn Leo RN 02/22/17 -  Nurse    Alona Decker, OT 04/03/18 -  OT        Wound 01/21/19 2000 Left posterior head laceration (Active)   Closure Open to air 2/5/2019  9:10 AM   Periwound ecchymotic 2/5/2019  9:10 AM   Drainage Amount none 2/5/2019  9:10 AM       Wound 01/22/19 2008 Left elbow abrasion (Active)   Closure Open to air 2/5/2019  9:10 AM   Base scab 2/5/2019  9:10 AM   Periwound dry;redness 2/4/2019   4:00 PM   Drainage Amount none 2/5/2019  9:10 AM       Wound 01/31/19 0939 Left upper chest incision;surgical (Active)   Dressing Appearance dry;intact;no drainage 2/5/2019  9:10 AM   Closure Adhesive closure strips 2/5/2019  9:10 AM   Drainage Amount none 2/5/2019  9:10 AM       Rehab Goal Summary     Row Name 02/05/19 1400 02/05/19 1217          Physical Therapy Goals    Bed Mobility Goal Selection (PT)  --  bed mobility, PT goal 1  -DM     Transfer Goal Selection (PT)  --  transfer, PT goal 1  -DM     Gait Training Goal Selection (PT)  --  gait training, PT goal 1  -DM     Stairs Goal Selection (PT)  --  stairs, PT goal 1  -DM        Bed Mobility Goal 1 (PT)    Activity/Assistive Device (Bed Mobility Goal 1, PT)  --  bed mobility activities, all  -DM     [place] Level/Cues Needed (Bed Mobility Goal 1, PT)  --  independent  -DM     Time Frame (Bed Mobility Goal 1, PT)  --  long term goal (LTG);1 week  -DM     Progress/Outcomes (Bed Mobility Goal 1, PT)  --  goal partially met  -DM        Transfer Goal 1 (PT)    Activity/Assistive Device (Transfer Goal 1, PT)  --  sit-to-stand/stand-to-sit;bed-to-chair/chair-to-bed  -DM     [place] Level/Cues Needed (Transfer Goal 1, PT)  --  independent  -DM     Time Frame (Transfer Goal 1, PT)  --  long term goal (LTG);1 week  -DM     Progress/Outcome (Transfer Goal 1, PT)  --  goal ongoing;good progress toward goal  -DM        Gait Training Goal 1 (PT)    Activity/Assistive Device (Gait Training Goal 1, PT)  --  gait (walking locomotion);assistive device use;walker, rolling stable VS;O2 SAT >= 92%;No further syncope/dizzy spells  -DM     [place] Level (Gait Training Goal 1, PT)  --  independent  -DM     Distance (Gait Goal 1, PT)  --  350  -DM     Time Frame (Gait Training Goal 1, PT)  --  long term goal (LTG);1 week  -DM     Progress/Outcome (Gait Training Goal 1, PT)  --  good progress toward goal;goal partially met  -DM        Stairs Goal 1 (PT)     Activity/Assistive Device (Stairs Goal 1, PT)  --  stairs, all skills;cane, straight  -DM     [place] Level/Cues Needed (Stairs Goal 1, PT)  --  independent  -DM     Number of Stairs (Stairs Goal 1, PT)  --  1  -DM     Time Frame (Stairs Goal 1, PT)  --  long term goal (LTG);1 week  -DM     Progress/Outcome (Stairs Goal 1, PT)  --  goal not met  -DM        Patient Education Goal (PT)    Activity (Patient Education Goal, PT)  --  HEP exer  -DM     [place]/Cues/Accuracy (Memory Goal 2, PT)  --  demonstrates adequately;verbalizes understanding  -DM     Time Frame (Patient Education Goal, PT)  --  long term goal (LTG);1 week  -DM     Progress/Outcome (Patient Education Goal, PT)  --  goal partially met  -DM        Transfer Goal 1 (OT)    Progress/Outcome (Transfer Goal 1, OT)  goal met  -KF  --        Bathing Goal 1 (OT)    Progress/Outcomes (Bathing Goal 1, OT)  continuing progress toward goal  -KF  --        Dressing Goal 1 (OT)    Progress/Outcome (Dressing Goal 1, OT)  goal met  -KF  --        Toileting Goal 1 (OT)    Progress/Outcome (Toileting Goal 1, OT)  good progress toward goal;goal ongoing  -KF  --         Activity Tolerance Goal 1 (OT)    Progress/Outcome (Activity Tolerance Goal 1, OT)  good progress toward goal;goal ongoing  -KF  --       User Key  (r) = Recorded By, (t) = Taken By, (c) = Cosigned By    Initials Name Provider Type Discipline    DM Carmita Venegas, PT Physical Therapist PT    Alona Decker, OT Occupational Therapist OT          Occupational Therapy Education     Title: PT OT SLP Therapies (Not Started)     Topic: Occupational Therapy (Done)     Point: ADL training (Done)     Description: Instruct learner(s) on proper safety adaptation and remediation techniques during self care or transfers.   Instruct in proper use of assistive devices.    Learning Progress Summary           Patient Acceptance, E,TB,D, VU,DU by SCOTT at 2/5/2019  2:00 PM    Comment:  Energy conservation,  activity pacing for ADL tasks, and PLB incorporated; pacemaker precautions related to UB dressing and bed mobility    Acceptance, E,TB,D, VU,NR by KF at 2/4/2019  9:22 AM    Comment:  Incorporation of rest breaks with grooming tasks, PLB incorporation in to functional mobility and functional transfers    Acceptance, E,D, VU by ZC at 2/1/2019 10:50 AM    Comment:  EC/WS. Safety during ADL dressing to reduce falls. Safety during HEP. HEP technique alterations w/ percautions.    Acceptance, E, VU by TA at 1/29/2019  1:59 PM    Comment:  Safety with ADLs; reinforced need for call for assist with OOB activities.    Acceptance, E,TB,D, VU,DU by KF at 1/28/2019  9:15 AM    Comment:  Safe awareness for LB dressing and incorporation of EC techniques for LB dressing    Acceptance, E, VU,NR by AC at 1/25/2019  2:57 PM    Comment:  adaptive breathing    Acceptance, E,TB,D, DU,VU by KF at 1/23/2019  1:28 PM    Comment:  Purpose of skilled OT services, PLB, EC for ADL completion in toileting, safe seq with RW with cues for environmental scanning                   Point: Home exercise program (Done)     Description: Instruct learner(s) on appropriate technique for monitoring, assisting and/or progressing therapeutic exercises/activities.    Learning Progress Summary           Patient Acceptance, E,TB,D, VU,DU by KF at 2/5/2019  2:00 PM    Comment:  Energy conservation, activity pacing for ADL tasks, and PLB incorporated; pacemaker precautions related to UB dressing and bed mobility    Acceptance, E,TB,D, VU,NR by KF at 2/4/2019  9:22 AM    Comment:  Incorporation of rest breaks with grooming tasks, PLB incorporation in to functional mobility and functional transfers    Acceptance, E,D, VU by ZC at 2/1/2019 10:50 AM    Comment:  EC/WS. Safety during ADL dressing to reduce falls. Safety during HEP. HEP technique alterations w/ percautions.                   Point: Precautions (Done)     Description: Instruct learner(s) on prescribed  precautions during self-care and functional transfers.    Learning Progress Summary           Patient Acceptance, E,TB,D, VU,DU by KF at 2/5/2019  2:00 PM    Comment:  Energy conservation, activity pacing for ADL tasks, and PLB incorporated; pacemaker precautions related to UB dressing and bed mobility    Acceptance, E,TB,D, VU,NR by KF at 2/4/2019  9:22 AM    Comment:  Incorporation of rest breaks with grooming tasks, PLB incorporation in to functional mobility and functional transfers    Acceptance, E,D, VU by ZC at 2/1/2019 10:50 AM    Comment:  EC/WS. Safety during ADL dressing to reduce falls. Safety during HEP. HEP technique alterations w/ percautions.    Acceptance, E, VU by TA at 1/29/2019  1:59 PM    Comment:  Safety with ADLs; reinforced need for call for assist with OOB activities.    Acceptance, E,TB,D, VU,DU by KF at 1/28/2019  9:15 AM    Comment:  Safe awareness for LB dressing and incorporation of EC techniques for LB dressing    Acceptance, E,TB,D, DU,VU by KF at 1/23/2019  1:28 PM    Comment:  Purpose of skilled OT services, PLB, EC for ADL completion in toileting, safe seq with RW with cues for environmental scanning                   Point: Body mechanics (Done)     Description: Instruct learner(s) on proper positioning and spine alignment during self-care, functional mobility activities and/or exercises.    Learning Progress Summary           Patient Acceptance, E,TB,D, VU,DU by KF at 2/5/2019  2:00 PM    Comment:  Energy conservation, activity pacing for ADL tasks, and PLB incorporated; pacemaker precautions related to UB dressing and bed mobility    Acceptance, E,TB,D, VU,NR by KF at 2/4/2019  9:22 AM    Comment:  Incorporation of rest breaks with grooming tasks, PLB incorporation in to functional mobility and functional transfers    Acceptance, E,D, VU by ZABBY at 2/1/2019 10:50 AM    Comment:  EC/WS. Safety during ADL dressing to reduce falls. Safety during HEP. HEP technique alterations w/  percautions.    Acceptance, E, NR by PD at 1/30/2019 12:54 AM    Acceptance, E,TB,D, VU,DU by KF at 1/28/2019  9:15 AM    Comment:  Safe awareness for LB dressing and incorporation of EC techniques for LB dressing    Acceptance, E,TB,D, DU,VU by KF at 1/23/2019  1:28 PM    Comment:  Purpose of skilled OT services, PLB, EC for ADL completion in toileting, safe seq with RW with cues for environmental scanning                               User Key     Initials Effective Dates Name Provider Type Discipline    AC 06/23/15 -  Akilah Quiroga, OT Occupational Therapist OT    TA 03/14/16 -  Kamron Imnan, OT Occupational Therapist OT    PD 05/30/17 -  Desmond Garay RN Registered Nurse Nurse    KF 04/03/18 -  Alona Hernandez, OT Occupational Therapist OT     01/03/19 -  Bo Esposito OT Student OT Student OT                OT Recommendation and Plan  Outcome Summary/Treatment Plan (OT)  Daily Summary of Progress (OT): progress toward functional goals as expected  Anticipated Discharge Disposition (OT): inpatient rehabilitation facility  Planned Therapy Interventions (OT Eval): activity tolerance training, adaptive equipment training, BADL retraining, cognitive/visual perception retraining, functional balance retraining, IADL retraining, neuromuscular control/coordination retraining, occupation/activity based interventions, patient/caregiver education/training, ROM/therapeutic exercise, strengthening exercise, transfer/mobility retraining  Therapy Frequency (OT Eval): daily  Daily Summary of Progress (OT): progress toward functional goals as expected  Plan of Care Review  Plan of Care Reviewed With: patient  Plan of Care Reviewed With: patient  Outcome Summary: Pt with improved standing tolerance for ADL tasks completed standing at sink side without AD, cues for PLB and activity pacing for energy conservation throughout ADL tasks, supervision for doffing front opening gown and donning pull over shirt  maintaining pacemaker precautions; preparing for d/c to rehab today    Outcome Measures     Row Name 02/05/19 1400 02/05/19 1217 02/04/19 1434       How much help from another person do you currently need...    Turning from your back to your side while in flat bed without using bedrails?  --  4  -DM  4  -DM (r) JR (t) DM (c)    Moving from lying on back to sitting on the side of a flat bed without bedrails?  --  4  -DM  4  -DM (r) JR (t) DM (c)    Moving to and from a bed to a chair (including a wheelchair)?  --  3  -DM  3  -DM (r) JR (t) DM (c)    Standing up from a chair using your arms (e.g., wheelchair, bedside chair)?  --  4  -DM  4  -DM (r) JR (t) DM (c)    Climbing 3-5 steps with a railing?  --  3  -DM  3  -DM (r) JR (t) DM (c)    To walk in hospital room?  --  3  -DM  3  -DM (r) JR (t) DM (c)    AM-PAC 6 Clicks Score  --  21  -DM  21  -DM (r) JR (t)       How much help from another is currently needed...    Putting on and taking off regular lower body clothing?  3  -KF  --  --    Bathing (including washing, rinsing, and drying)  3  -KF  --  --    Toileting (which includes using toilet bed pan or urinal)  4  -KF  --  --    Putting on and taking off regular upper body clothing  4  -KF  --  --    Taking care of personal grooming (such as brushing teeth)  3  -KF  --  --    Eating meals  4  -KF  --  --    Score  21  -KF  --  --       Functional Assessment    Outcome Measure Options  AM-PAC 6 Clicks Daily Activity (OT)  -KF  AM-PAC 6 Clicks Basic Mobility (PT)  -DM  AM-PAC 6 Clicks Basic Mobility (PT)  -DM (r) JR (t) DM (c)    Row Name 02/04/19 0922             How much help from another is currently needed...    Putting on and taking off regular lower body clothing?  3  -KF      Bathing (including washing, rinsing, and drying)  3  -KF      Toileting (which includes using toilet bed pan or urinal)  4  -KF      Putting on and taking off regular upper body clothing  4  -KF      Taking care of personal grooming (such  as brushing teeth)  4  -KF      Eating meals  4  -KF      Score  22  -KF         Functional Assessment    Outcome Measure Options  --  -KF        User Key  (r) = Recorded By, (t) = Taken By, (c) = Cosigned By    Initials Name Provider Type    Carmita Pablo, PT Physical Therapist    Alona Decker, OT Occupational Therapist    Mable Hendrix, PT Student PT Student           Time Calculation:    Time Calculation- OT     Row Name 02/05/19 1400             Time Calculation- OT    OT Start Time  1400  -KF      Total Timed Code Minutes- OT  10 minute(s)  -KF      OT Received On  02/05/19  -KF      OT Goal Re-Cert Due Date  02/14/19  -KF         Timed Charges    14776 - OT Self Care/Mgmt Minutes  10  -KF        User Key  (r) = Recorded By, (t) = Taken By, (c) = Cosigned By    Initials Name Provider Type    Alona Decker, OT Occupational Therapist        Therapy Suggested Charges     Code   Minutes Charges    29641 (CPT®) Hc Ot Neuromusc Re Education Ea 15 Min      45122 (CPT®) Hc Ot Ther Proc Ea 15 Min      40734 (CPT®) Hc Ot Therapeutic Act Ea 15 Min      67770 (CPT®) Hc Ot Manual Therapy Ea 15 Min      93345 (CPT®) Hc Ot Iontophoresis Ea 15 Min      26444 (CPT®) Hc Ot Elec Stim Ea-Per 15 Min      92624 (CPT®) Hc Ot Ultrasound Ea 15 Min      88907 (CPT®) Hc Ot Self Care/Mgmt/Train Ea 15 Min 10 1    Total  10 1        Therapy Charges for Today     Code Description Service Date Service Provider Modifiers Qty    02751864666 HC OT THERAPEUTIC ACT EA 15 MIN 2/4/2019 Alona Hernandez OT GO 1    14627259062 HC OT SELF CARE/MGMT/TRAIN EA 15 MIN 2/5/2019 Alona Hernandez OT GO 1               OT Discharge Summary  Anticipated Discharge Disposition (OT): inpatient rehabilitation facility  Reason for Discharge: Discharge from facility  Outcomes Achieved: Patient able to partially acheive established goals  Discharge Destination: Inpatient rehabilitation facility    Alona Hernandez OT  2/5/2019

## 2019-02-05 NOTE — DISCHARGE SUMMARY
[facility] Medicine Services  TRANSFER SUMMARY    Patient Name: Jani Pena  : 1950  MRN: [id number]    Date of Admission: 2019  Date of Discharge:  2019  Length of Stay: 14  Primary Care Physician: Miguel Rojas MD    Consults     Date and Time Order Name Status Description    2019 1247 Inpatient Gastroenterology Consult Completed     2019 1501 Inpatient Internal Medicine Consult Completed           Hospital Course     Presenting Problem:   Ventricular tachycardia (CMS/HCC) [I47.2]    Active Hospital Problems    Diagnosis Date Noted   • **Sick sinus syndrome (CMS/HCC) [I49.5] 2019   • Scalp laceration [S01.01XA] 2019   • Esophageal dysphagia [R13.10] 2019   • Anemia [D64.9] 2018   • Dysphagia [R13.10] 2018   • Chronic diastolic congestive heart failure (CMS/HCC) [I50.32] 2018   • Atrial fibrillation (CMS/HCC) [I48.91] 2016   • Chronic kidney disease, stage III (moderate) (CMS/HCC) [N18.3] 2016   • Sleep apnea [G47.30] 2016   • Essential hypertension [I10] 2016   • Steroid-dependent COPD (CMS/HCC) [J44.9] 2016   • Type 2 diabetes mellitus with stage 3 chronic kidney disease (CMS/HCC) [E11.22, N18.3] 2016      Resolved Hospital Problems    Diagnosis Date Noted Date Resolved   • Ventricular tachycardia (CMS/HCC) [I47.2] 2019   • N&V (nausea and vomiting) [R11.2] 2019   • Syncope and collapse [R55] 2019          Hospital Course:  Jani Pena is a 68 y.o. male with PMH significant for HTN, HLD, chronic respiratory failure on 3-4L NC, COPD on chronic steroid therapy, CKD III (baseline Cr 1.4), chronic systolic CHF and atrial fibrillation (s/p implantable pulse generator ).  Admitted to [facility] 19 after syncopal event with head injury/scalp lacerations.  Transferred to [facility] 19 for ventricular arrhythmia and elevated troponin.        Scalp laceration  - staples 1/20 at [facility], removed 1/30  - stable, no active bleed     Syncope  NSVT  High-grade AV block  - cardiology following  - due to history of NSVT and LV dysfunction  - PPM placed per WAQAR Fenton MD     Non-obstructive CAD  Elevated troponins  - Troponin 0.034 >0.057.  EKG NSR with freq pvcs.  Cardiology does not plan further ischemic evaluation  -[facility] 5/2018 with mild diffuse atherosclerosis, no significant flow-limiting CAD  - Continue DAPT/statin/BB     Chronic systolic heart failure  -echo 1/2/19 EF 45%     Mild oral dysphagia  Esophageal dysphagia/dysmotility  - s/p esophageal dilation historically - missed a recent appointment 5 months ago for repeat dilation   - MBS/FEES on 1/13 - mild oral dysphagia. Soft textures, thin liquids   - Needs outpatient esophageal dilation at some point - will need to be off anticoagulation      Persistent nausea/vomiting   Gastroparesis   - Appreciate GI evaluation. S/p EGD on 1/26/19. EGD showed a large amount of undigested food in the stomach with no evidence of gastric outlet obstruction, consistent with gastroparesis  -was on IV Reglan - switched to PO while in-house  - At d/c, stopped reglan -starting Cassandra Root 500mg PO TID before meals per GI recs  - Boost with meals     Chronic respiratory failure with hypoxia  COPD, steroid-dependent  - on prednisone 10mg daily chronically   --SOA with ambulation in the room improving, now on his Symbicort, and on scheudled and prn nebs for now.  Further med adjustments per outside provider      CKD III, stable at baseline  HTN, BP is controlled  Hyperlipidemia, statin   PEDRO, NIPPV QHS     Pt is seen today resting up on side of bed in no acute distress.  Wearing home chronic oxygen dosing with sats 99%.  Speaking in full sentences.  Tolerating diet.  Ambulating.  Mild shortness of air above baseline slowly improving and will continue on short taper of prednisone back to his home chronic dose of 10 mg daily  as noted.  Continue scheduled and when necessary nebs for now.  He is currently hemodynamically stable and afebrile.  He's been cleared by all services for discharge to rehabilitation today.  He'll follow-up with cardiology, EP, GI, and PCP as below.       Discharge Follow Up Recommendations for labs/diagnostics:  Transferring on short steroid taper back to his chronic 10 mg daily dose as below    Continuing home chronic oxygen of 3-4 liters at all times    Scheduling nebs as well as when necessary, adjust by provider at SNF as needed    Follow-up with GI, cardiology, electrophysiology provider, and PCP as below    Day of Discharge     HPI:   Is seen resting up on side of bed talking on the phone.  States he feels like he is breathing a little easier today.  On home oxygen of 3-4 LNC at all times with sats of 99%.  No nausea, vomiting, chest pain late.  Intermittent nausea but improved.  Tolerating soft diet without dysphasia today.  Ambulating independent.  Still feels weak.  Denies to his new pacemaker site but also improving.  No dizziness, headache, vision changes, syncope.  Feels ready for transfer to rehabilitation today as planned.    Review of Systems  Gen- No fevers, chills  CV- No chest pain, palpitations  Resp- No cough, + mild intermittent dyspnea (improved)  GI- No N/V/D, abd pain    Otherwise complete ROS is negative except as mentioned in the HPI.    Vital Signs:   Temp:  [98 °F (36.7 °C)-98.2 °F (36.8 °C)] 98.2 °F (36.8 °C)  Heart Rate:  [60-94] 68  Resp:  [18-20] 18  BP: (119-158)/(64-82) 150/82     Physical Exam:  Constitutional: appears older than stated age.  Chronically ill appearing.  Awake, alert, sitting up the side of bed  HENT: NCAT, mucous membranes moist  Respiratory: Decreased at bases bilaterally, rare exp wheeze noted, respiratory effort  unlabored at rest labored, on supplemental O2 chronically and sats are 99% on home dose.  Speaking in full sentences.  Cardiovascular: RRR, no  murmurs  Gastrointestinal: Positive bowel sounds, soft, nontender, nondistended  Musculoskeletal: No bilateral ankle edema. NGO spont  Psychiatric: Appropriate affect, cooperative  Neurologic: Oriented x 3, strength symmetric in all extremities, Cranial Nerves grossly intact to confrontation, speech clear and appropriate.  Follows commands.  Skin: No rashes noted to exposed areas of skin.  Left upper chest with dressing in place to PPM incision site.  Ecchymosis noted in surrounding tissues improving, as well as to area of left posterior head laceration healing, dry and intact.       Pertinent Results     Results from last 7 days   Lab Units 02/04/19  0336 02/03/19  0949 01/31/19  0809   WBC 10*3/mm3  --   --  9.38   HEMOGLOBIN g/dL  --   --  11.2*   HEMATOCRIT %  --   --  34.7*   PLATELETS 10*3/mm3  --   --  269   SODIUM mmol/L 140 140 135   POTASSIUM mmol/L 4.0 3.7 4.0   CHLORIDE mmol/L 106 104 105   CO2 mmol/L 29.0 28.0 25.0   BUN mg/dL 25* 29* 50*   CREATININE mg/dL 1.30 1.42* 1.50*   GLUCOSE mg/dL 99 164* 84   CALCIUM mg/dL 8.5* 9.1 8.8           Invalid input(s): PROT, LABALBU        Invalid input(s): TG, LDLCALC, LDLREALC      Brief Urine Lab Results  (Last result in the past 365 days)      Color   Clarity   Blood   Leuk Est   Nitrite   Protein   CREAT   Urine HCG        01/30/19 1300 Yellow Clear Negative Trace Negative Negative               Microbiology Results Abnormal     None          Imaging Results (all)     Procedure Component Value Units Date/Time    XR Chest PA & Lateral [[id number]] Collected:  02/01/19 1002     Updated:  02/01/19 1324    Narrative:       EXAMINATION: XR CHEST, PA AND LATERAL-02/01/2019:      INDICATION: PACEMAKER.      COMPARISON: 04/13/2017.     FINDINGS: There is a normal cardiac silhouette. There are chronic  pulmonary changes. There is no acute inflammatory process, mass or  effusion. A dual-lead pacemaker has been inserted since the previous  examination and is well  positioned. There is no pneumothorax.           Impression:       There has been interval insertion of a dual-lead pacemaker.  The pacemaker is well positioned. There is no pneumothorax.     D:  02/01/2019  E:  02/01/2019     This report was finalized on 2/1/2019 1:22 PM by Dr. Kamron Armas MD.       XR Abdomen KUB [[id number]] Collected:  01/28/19 1756     Updated:  01/28/19 1951    Narrative:       EXAM:    XR Abdomen, 1 View     EXAM DATE/TIME:    1/28/2019 5:56 PM     CLINICAL HISTORY:    68 years old, male; Type 2 diabetes mellitus with unspecified complications;   Ventricular tachycardia; Persistent atrial fibrillation; Chronic systolic   (congestive) heart failure; Nausea; Dysphagia, unspecified; Dysphagia, oral   phase; Other malaise; Other reduced mobility; Other reduced mobility; Signs and   symptoms     TECHNIQUE:    Frontal supine view of the abdomen/pelvis.     COMPARISON:   [facility] - RD ABDOMEN KUB 5/30/2013 11:54:08 AM    FINDINGS:     No definite suggestion of acute gastric or small bowel obstruction. Dense   material, probably from a prior imaging study, opacifies the more distal colon.   Some colonic fecal material and gas more proximally.     No free air is seen.     No definite soft tissue mass/mass effect.     Grossly intact visualized skeletal structures. Incompletely imaged total left   hip arthroplasty.      Impression:       1. Overall nonobstructive bowel gas pattern.   2. Other findings as above.     THIS DOCUMENT HAS BEEN ELECTRONICALLY SIGNED BY CAIT CEDEÑO MD    FL Limited Ugi For Mbs Reflux [[id number]] Collected:  01/23/19 1523     Updated:  01/23/19 1638    Narrative:       EXAMINATION: FL LIMITED UGI FOR MBS REFLUX-, FL VIDEO SWALLOW W SPEECH-     INDICATION: dysphagia; Z74.09-Other reduced mobility; R13.10-Dysphagia,  unspecified; Z74.09-Other reduced mobility     TECHNIQUE: 48 seconds of fluoroscopic time was used for this exam. 2  associated images were saved. The patient was  evaluated in the seated  lateral position while taking a variety of consistencies of barium by  mouth under the direction of speech pathology.     COMPARISON: NONE     FINDINGS: There was no penetration and no aspiration with any of the  media ingested. A limited view the esophagus with the patient in the  seated lateral position demonstrated no signs of a focal esophageal  stricture, or gastroesophageal reflux.          Impression:       Fluoroscopy provided for a modified barium swallow, and  limited upper GI series. Please see speech therapy report for full  details and recommendations. Limited upper GI series appeared within  normal limits.         This report was finalized on 1/23/2019 4:36 PM by Dr. Kamron Armas MD.       FL Video Swallow With Speech [[id number]] Collected:  01/23/19 1523     Updated:  01/23/19 1638    Narrative:       EXAMINATION: FL LIMITED UGI FOR MBS REFLUX-, FL VIDEO SWALLOW W SPEECH-     INDICATION: dysphagia; Z74.09-Other reduced mobility; R13.10-Dysphagia,  unspecified; Z74.09-Other reduced mobility     TECHNIQUE: 48 seconds of fluoroscopic time was used for this exam. 2  associated images were saved. The patient was evaluated in the seated  lateral position while taking a variety of consistencies of barium by  mouth under the direction of speech pathology.     COMPARISON: NONE     FINDINGS: There was no penetration and no aspiration with any of the  media ingested. A limited view the esophagus with the patient in the  seated lateral position demonstrated no signs of a focal esophageal  stricture, or gastroesophageal reflux.          Impression:       Fluoroscopy provided for a modified barium swallow, and  limited upper GI series. Please see speech therapy report for full  details and recommendations. Limited upper GI series appeared within  normal limits.         This report was finalized on 1/23/2019 4:36 PM by Dr. Kamron Armas MD.             Results for orders placed during the  hospital encounter of 01/20/19   Adult Transthoracic Echo Complete W/ Cont if Necessary Per Protocol    Narrative Technically difficult study   1) Borderline LVH , moderate dilation of LV with mild reduction systolic   function ( EF is about 45%)  2) Moderate left atrial enlargement with normal LVedp  3) Aortic sclerosis with mild AI   4) Mild MR   5) Mild TR with PAsp of 48 mm of hg   6) Mild dilation of the RV with normal RV Function   7) Severe hypokinesis of the inferior and septal walls            Discharge Details        Discharge Medications      New Medications      Instructions Start Date   acetaminophen 325 MG tablet  Commonly known as:  TYLENOL   650 mg, Oral, 4 Times Daily PRN      docusate sodium 100 MG capsule   100 mg, Oral, 2 Times Daily PRN      Cassandra Root 500 MG capsule   1 capsule, Oral, 3 Times Daily Before Meals      melatonin 5 MG sublingual tablet sublingual tablet   5 mg, Sublingual, Nightly      ondansetron 4 MG tablet  Commonly known as:  ZOFRAN   4 mg, Oral, Every 6 Hours PRN      pantoprazole 40 MG EC tablet  Commonly known as:  PROTONIX   40 mg, Oral, Every Morning Before Breakfast      polyethylene glycol pack packet  Commonly known as:  MIRALAX   17 g, Oral, Daily      tobramycin 0.3 % solution ophthalmic solution   1 drop, Both Eyes, Every 4 Hours Scheduled         Changes to Medications      Instructions Start Date   ALPRAZolam 0.5 MG tablet  Commonly known as:  XANAX  What changed:    · reasons to take this  · additional instructions   0.5 mg, Oral, Nightly PRN      atorvastatin 10 MG tablet  Commonly known as:  LIPITOR  What changed:    · medication strength  · how much to take  · when to take this   10 mg, Oral, Nightly      baclofen 10 MG tablet  Commonly known as:  LIORESAL  What changed:  Another medication with the same name was removed. Continue taking this medication, and follow the directions you see here.   TAKE ONE TABLET BY MOUTH THREE TIMES A DAY      fluticasone 50  MCG/ACT nasal spray  Commonly known as:  FLONASE  What changed:    · when to take this  · reasons to take this   1 spray, Nasal, Daily      predniSONE 10 MG tablet  Commonly known as:  DELTASONE  What changed:    · medication strength  · additional instructions  · Another medication with the same name was removed. Continue taking this medication, and follow the directions you see here.   Taper as follows: 20 mg po daily x 2 days, then 15 mg daily x 2 days, then 10 mg po daily continuous (home dose daily)      albuterol (2.5 MG/3ML) 0.083% nebulizer solution  Commonly known as:  PROVENTIL  What changed:  Another medication with the same name was added. Make sure you understand how and when to take each.   2.5 mg, Nebulization, Every 4 Hours PRN      PROAIR  (90 Base) MCG/ACT inhaler  Generic drug:  albuterol sulfate HFA  What changed:  Another medication with the same name was added. Make sure you understand how and when to take each.   2 puffs, Inhalation, Every 4 Hours PRN      albuterol (2.5 MG/3ML) 0.083% nebulizer solution  Commonly known as:  PROVENTIL  What changed:  You were already taking a medication with the same name, and this prescription was added. Make sure you understand how and when to take each.   2.5 mg, Nebulization, Every 6 Hours - RT      spironolactone 25 MG tablet  Commonly known as:  ALDACTONE  What changed:    · how much to take  · how to take this  · when to take this   TAKE ONE TABLET BY MOUTH DAILY         Continue These Medications      Instructions Start Date   alfuzosin 10 MG 24 hr tablet  Commonly known as:  UROXATRAL   TAKE ONE TABLET BY MOUTH DAILY      aspirin 81 MG EC tablet   81 mg, Oral, Daily      BREO ELLIPTA 200-25 MCG/INH inhaler  Generic drug:  Fluticasone Furoate-Vilanterol   1 each, Inhalation, Daily      budesonide 0.5 MG/2ML nebulizer solution  Commonly known as:  PULMICORT   0.5 mg, Nebulization, 2 Times Daily - RT      calcium carbonate 600 MG tablet  Commonly  known as:  OS-HAFSA   600 mg, Oral, 2 Times Daily With Meals      clopidogrel 75 MG tablet  Commonly known as:  PLAVIX   TAKE ONE TABLET BY MOUTH DAILY      escitalopram 10 MG tablet  Commonly known as:  LEXAPRO   10 mg, Oral, Daily      finasteride 5 MG tablet  Commonly known as:  PROSCAR   5 mg, Oral, Daily      furosemide 20 MG tablet  Commonly known as:  LASIX   TAKE ONE TABLET BY MOUTH DAILY      ipratropium-albuterol 0.5-2.5 mg/3 ml nebulizer  Commonly known as:  DUO-NEB   3 mL, Nebulization, Every 6 Hours - RT      iron polysaccharides 150 MG capsule  Commonly known as:  NIFEREX   150 mg, Oral, 2 Times Daily      metoprolol tartrate 25 MG tablet  Commonly known as:  LOPRESSOR   25 mg, Oral, Every 12 Hours Scheduled      mirtazapine 15 MG tablet  Commonly known as:  REMERON   15 mg, Oral, Nightly      multivitamin with minerals tablet tablet   1 tablet, Oral, Daily      NITROSTAT 0.4 MG SL tablet  Generic drug:  nitroglycerin   0.4 mg, Sublingual, Every 5 Minutes PRN      O2  Commonly known as:  OXYGEN   4 L/min, Inhalation, Once, Walking on 4L and sitting 3L      oxybutynin 5 MG tablet  Commonly known as:  DITROPAN   TAKE ONE TABLET BY MOUTH EVERY NIGHT AT BEDTIME      traMADol 50 MG tablet  Commonly known as:  ULTRAM   50 mg, Oral, Every 8 Hours PRN             Allergies   Allergen Reactions   • Mucomyst [Acetylcysteine] Other (See Comments)     bronchospasms   • Milk-Related Compounds Nausea And Vomiting   • Sulfa Antibiotics Nausea And Vomiting         Discharge Disposition:  Skilled Nursing Facility (DC - External)    Discharge Diet:  Diet Order   Procedures   • Diet Soft Texture; [employer] Foods; Thin       Discharge Activity:  Activity Instructions     Activity as Tolerated      Measure Blood Pressure      Measure Weight            CODE STATUS:    Code Status and Medical Interventions:   Ordered at: 01/23/19 3629     Level Of Support Discussed With:    Patient     Code Status:    CPR     Medical Interventions  (Level of Support Prior to Arrest):    Full         Future Appointments   Date Time Provider Department Center   2/12/2019 11:30 AM WOUND CHECK [facility] None   3/8/2019 11:30 AM Roosevelt Lowe MD [facility] None   3/8/2019  3:00 PM Senait De León APRN [facility] None   4/29/2019 11:15 AM Ulysses Bass MD [facility] None   5/7/2019  9:00 AM Zach Conley MD [facility] None       Additional Instructions for the Follow-ups that You Need to Schedule     Ambulatory Referral to Home Health   As directed      Face to Face Visit Date:  1/25/2019    Follow-up Provider for Plan of Care?:  I treated the patient in an acute care facility and will not continue treatment after discharge.    Follow-up Provider:  MIGUEL CHOUDHARY [[id number]]    Reason/Clinical Findings:  Ventricular tachycardia    Describe mobility limitations that make leaving home difficult:  impaired physical mobility, generalized weakness and debility    Nursing/Therapeutic Services Requested:  Skilled Nursing Physical Therapy Occupational Therapy    Skilled nursing orders:  Cardiopulmonary assessments Medication education O2 instruction    PT orders:  Gait Training Strengthening    Weight Bearing Status:  As Tolerated    Occupational orders:  Activities of daily living Energy conservation Strengthening    Frequency:  1 Week 1         Discharge Follow-up with PCP   As directed       Currently Documented PCP:    Miguel Choudhary MD    PCP Phone Number:    [phone]     Follow Up Details:  To be seen by provider at SNF in 24-48 hrs, PCP 1 week of dc         Discharge Follow-up with Specialty: Dr Encinas (EP provider) in 1 week for wound ck--then in 3 months for re-eval with PPM interogation planned per notes.---PLEASE make BOTH appts for pt prior to transfer today.   As directed      Specialty:  Dr Encinas (EP provider) in 1 week for wound ck--then in 3 months for re-eval with PPM interogation planned per notes.---PLEASE make BOTH appts for pt  prior to transfer today.         Discharge Follow-up with Specified Provider: [facility] in 2-4 weeks for re-eval  (will need repeat EGD in near future per GI notes)--please make f/u appt today before dc   As directed      To:  [facility] in 2-4 weeks for re-eval  (will need repeat EGD in near future per GI notes)--please make f/u appt today before dc         Discharge Follow-up with Specified Provider: Cardiology f/u per their recs   As directed      To:  Cardiology f/u per their recs                 Time Spent on Discharge:  55 minutes    Electronically signed by Kizzy Biggs, PAL, 02/05/19, 2:03 PM.

## 2019-02-05 NOTE — DISCHARGE PLACEMENT REQUEST
"Karina Pena (68 y.o. Male)     Date of Birth Social Security Number Address Home Phone MRN    1950  [address] [place] [id number] [phone] [id number]    Yazidi Marital Status          Druze        Admission Date Admission Type Admitting Provider Attending Provider Department, Room/Bed    [address] MD Russell, Marc P, MD [address], [address]/1    Discharge Date Discharge Disposition Discharge Destination         Skilled Nursing Facility (DC - External)              Attending Provider:  Fito Street MD    Allergies:  Mucomyst [Acetylcysteine], Milk-related Compounds, Sulfa Antibiotics    Isolation:  None   Infection:  None   Code Status:  CPR    Ht:  172.7 cm (68\")   Wt:  74.8 kg (165 lb)    Admission Cmt:  None   Principal Problem:  Sick sinus syndrome (CMS/HCC) [I49.5] More...                 Active Insurance as of 2019     Primary Coverage     Payor Plan Insurance Group Employer/Plan Group    [facility] MEDICARE REPLACEMENT [facility] [id number]     Payor Plan Address Payor Plan Phone Number Payor Plan Fax Number Effective Dates    [address] [address]   2016 - None Entered    [facility] [id number]       Subscriber Name Subscriber Birth Date Member ID       KARINA PENA 1950 [id number]                 Emergency Contacts      (Rel.) Home Phone Work Phone Mobile Phone    April Easton (Daughter) [phone] -- [phone]    JeanJean Marie (Brother) [phone] -- [phone]    PenaMinda palomo (Mother) [phone] -- [phone]                 Discharge Summary      Kizzy Biggs APRN at 2019 10:40 AM              [facility] Medicine Services  TRANSFER SUMMARY    Patient Name: Karina Pena  : 1950  MRN: [id number]    Date of Admission: 2019  Date of Discharge:  2019  Length of Stay: 14  Primary Care Physician: Miguel Rojas, " MD    Consults     Date and Time Order Name Status Description    1/26/2019 1247 Inpatient Gastroenterology Consult Completed     1/22/2019 1501 Inpatient Internal Medicine Consult Completed           Hospital Course     Presenting Problem:   Ventricular tachycardia (CMS/[facility]) [I47.2]    Active Hospital Problems    Diagnosis Date Noted   • **Sick sinus syndrome (CMS/HCC) [I49.5] 02/03/2019   • Scalp laceration [S01.01XA] 01/22/2019   • Esophageal dysphagia [R13.10] 01/22/2019   • Anemia [D64.9] 09/11/2018   • Dysphagia [R13.10] 08/14/2018   • Chronic diastolic congestive heart failure (CMS/HCC) [I50.32] 03/19/2018   • Atrial fibrillation (CMS/[facility]) [I48.91] 08/09/2016   • Chronic kidney disease, stage III (moderate) (CMS/[facility]) [N18.3] 08/09/2016   • Sleep apnea [G47.30] 08/09/2016   • Essential hypertension [I10] 08/09/2016   • Steroid-dependent COPD (CMS/[facility]) [J44.9] 08/09/2016   • Type 2 diabetes mellitus with stage 3 chronic kidney disease (CMS/[facility]) [E11.22, N18.3] 08/09/2016      Resolved Hospital Problems    Diagnosis Date Noted Date Resolved   • Ventricular tachycardia (CMS/[facility]) [I47.2] 01/22/2019 02/03/2019   • N&V (nausea and vomiting) [R11.2] 01/22/2019 02/05/2019   • Syncope and collapse [R55] 01/21/2019 02/03/2019          Hospital Course:  Jani Pena is a 68 y.o. male with PMH significant for HTN, HLD, chronic respiratory failure on 3-4L NC, COPD on chronic steroid therapy, CKD III (baseline Cr 1.4), chronic systolic CHF and atrial fibrillation (s/p implantable pulse generator 2006).  Admitted to [facility] 1/20/19 after syncopal event with head injury/scalp lacerations.  Transferred to [facility] 1/22/19 for ventricular arrhythmia and elevated troponin.       Scalp laceration  - staples 1/20 at [facility], removed 1/30  - stable, no active bleed     Syncope  NSVT  High-grade AV block  - cardiology following  - due to history of NSVT and LV dysfunction  - PPM placed per WAQAR Fenton MD     Non-obstructive CAD  Elevated  troponins  - Troponin 0.034 >0.057.  EKG NSR with freq pvcs.  Cardiology does not plan further ischemic evaluation  -[facility] 5/2018 with mild diffuse atherosclerosis, no significant flow-limiting CAD  - Continue DAPT/statin/BB     Chronic systolic heart failure  -echo 1/2/19 EF 45%     Mild oral dysphagia  Esophageal dysphagia/dysmotility  - s/p esophageal dilation historically - missed a recent appointment 5 months ago for repeat dilation   - MBS/FEES on 1/13 - mild oral dysphagia. Soft textures, thin liquids   - Needs outpatient esophageal dilation at some point - will need to be off anticoagulation      Persistent nausea/vomiting   Gastroparesis   - Appreciate GI evaluation. S/p EGD on 1/26/19. EGD showed a large amount of undigested food in the stomach with no evidence of gastric outlet obstruction, consistent with gastroparesis  -was on IV Reglan - switched to PO while in-house  - At d/c, stopped reglan -starting Cassandra Root 500mg PO TID before meals per GI recs  - Boost with meals     Chronic respiratory failure with hypoxia  COPD, steroid-dependent  - on prednisone 10mg daily chronically   --SOA with ambulation in the room improving, now on his Symbicort, and on scheudled and prn nebs for now.  Further med adjustments per outside provider      CKD III, stable at baseline  HTN, BP is controlled  Hyperlipidemia, statin   PEDRO, NIPPV QHS     Pt is seen today resting up on side of bed in no acute distress.  Wearing home chronic oxygen dosing with sats 99%.  Speaking in full sentences.  Tolerating diet.  Ambulating.  Mild shortness of air above baseline slowly improving and will continue on short taper of prednisone back to his home chronic dose of 10 mg daily as noted.  Continue scheduled and when necessary nebs for now.  He is currently hemodynamically stable and afebrile.  He's been cleared by all services for discharge to rehabilitation today.  He'll follow-up with cardiology, EP, GI, and PCP as  below.       Discharge Follow Up Recommendations for labs/diagnostics:  Transferring on short steroid taper back to his chronic 10 mg daily dose as below    Continuing home chronic oxygen of 3-4 liters at all times    Scheduling nebs as well as when necessary, adjust by provider at SNF as needed    Follow-up with GI, cardiology, electrophysiology provider, and PCP as below    Day of Discharge     HPI:   Is seen resting up on side of bed talking on the phone.  States he feels like he is breathing a little easier today.  On home oxygen of 3-4 LNC at all times with sats of 99%.  No nausea, vomiting, chest pain late.  Intermittent nausea but improved.  Tolerating soft diet without dysphasia today.  Ambulating independent.  Still feels weak.  Denies to his new pacemaker site but also improving.  No dizziness, headache, vision changes, syncope.  Feels ready for transfer to rehabilitation today as planned.    Review of Systems  Gen- No fevers, chills  CV- No chest pain, palpitations  Resp- No cough, + mild intermittent dyspnea (improved)  GI- No N/V/D, abd pain    Otherwise complete ROS is negative except as mentioned in the HPI.    Vital Signs:   Temp:  [98 °F (36.7 °C)-98.2 °F (36.8 °C)] 98.2 °F (36.8 °C)  Heart Rate:  [60-94] 68  Resp:  [18-20] 18  BP: (119-158)/(64-82) 150/82     Physical Exam:  Constitutional: appears older than stated age.  Chronically ill appearing.  Awake, alert, sitting up the side of bed  HENT: NCAT, mucous membranes moist  Respiratory: Decreased at bases bilaterally, rare exp wheeze noted, respiratory effort  unlabored at rest labored, on supplemental O2 chronically and sats are 99% on home dose.  Speaking in full sentences.  Cardiovascular: RRR, no murmurs  Gastrointestinal: Positive bowel sounds, soft, nontender, nondistended  Musculoskeletal: No bilateral ankle edema. NGO spont  Psychiatric: Appropriate affect, cooperative  Neurologic: Oriented x 3, strength symmetric in all extremities,  Cranial Nerves grossly intact to confrontation, speech clear and appropriate.  Follows commands.  Skin: No rashes noted to exposed areas of skin.  Left upper chest with dressing in place to PPM incision site.  Ecchymosis noted in surrounding tissues improving, as well as to area of left posterior head laceration healing, dry and intact.       Pertinent Results     Results from last 7 days   Lab Units 02/04/19  0336 02/03/19  0949 01/31/19  0809   WBC 10*3/mm3  --   --  9.38   HEMOGLOBIN g/dL  --   --  11.2*   HEMATOCRIT %  --   --  34.7*   PLATELETS 10*3/mm3  --   --  269   SODIUM mmol/L 140 140 135   POTASSIUM mmol/L 4.0 3.7 4.0   CHLORIDE mmol/L 106 104 105   CO2 mmol/L 29.0 28.0 25.0   BUN mg/dL 25* 29* 50*   CREATININE mg/dL 1.30 1.42* 1.50*   GLUCOSE mg/dL 99 164* 84   CALCIUM mg/dL 8.5* 9.1 8.8           Invalid input(s): PROT, LABALBU        Invalid input(s): TG, LDLCALC, LDLREALC      Brief Urine Lab Results  (Last result in the past 365 days)      Color   Clarity   Blood   Leuk Est   Nitrite   Protein   CREAT   Urine HCG        01/30/19 1300 Yellow Clear Negative Trace Negative Negative               Microbiology Results Abnormal     None          Imaging Results (all)     Procedure Component Value Units Date/Time    XR Chest PA & Lateral [[id number]] Collected:  02/01/19 1002     Updated:  02/01/19 1324    Narrative:       EXAMINATION: XR CHEST, PA AND LATERAL-02/01/2019:      INDICATION: PACEMAKER.      COMPARISON: 04/13/2017.     FINDINGS: There is a normal cardiac silhouette. There are chronic  pulmonary changes. There is no acute inflammatory process, mass or  effusion. A dual-lead pacemaker has been inserted since the previous  examination and is well positioned. There is no pneumothorax.           Impression:       There has been interval insertion of a dual-lead pacemaker.  The pacemaker is well positioned. There is no pneumothorax.     D:  02/01/2019  E:  02/01/2019     This report was finalized on  2/1/2019 1:22 PM by Dr. Kamron Armas MD.       XR Abdomen KUB [[id number]] Collected:  01/28/19 1756     Updated:  01/28/19 1951    Narrative:       EXAM:    XR Abdomen, 1 View     EXAM DATE/TIME:    1/28/2019 5:56 PM     CLINICAL HISTORY:    68 years old, male; Type 2 diabetes mellitus with unspecified complications;   Ventricular tachycardia; Persistent atrial fibrillation; Chronic systolic   (congestive) heart failure; Nausea; Dysphagia, unspecified; Dysphagia, oral   phase; Other malaise; Other reduced mobility; Other reduced mobility; Signs and   symptoms     TECHNIQUE:    Frontal supine view of the abdomen/pelvis.     COMPARISON:   CR - RD ABDOMEN KUB 5/30/2013 11:54:08 AM    FINDINGS:     No definite suggestion of acute gastric or small bowel obstruction. Dense   material, probably from a prior imaging study, opacifies the more distal colon.   Some colonic fecal material and gas more proximally.     No free air is seen.     No definite soft tissue mass/mass effect.     Grossly intact visualized skeletal structures. Incompletely imaged total left   hip arthroplasty.      Impression:       1. Overall nonobstructive bowel gas pattern.   2. Other findings as above.     THIS DOCUMENT HAS BEEN ELECTRONICALLY SIGNED BY CAIT CEDEÑO MD    FL Limited Ugi For Mbs Reflux [[id number]] Collected:  01/23/19 1523     Updated:  01/23/19 1638    Narrative:       EXAMINATION: FL LIMITED UGI FOR MBS REFLUX-, FL VIDEO SWALLOW W SPEECH-     INDICATION: dysphagia; Z74.09-Other reduced mobility; R13.10-Dysphagia,  unspecified; Z74.09-Other reduced mobility     TECHNIQUE: 48 seconds of fluoroscopic time was used for this exam. 2  associated images were saved. The patient was evaluated in the seated  lateral position while taking a variety of consistencies of barium by  mouth under the direction of speech pathology.     COMPARISON: NONE     FINDINGS: There was no penetration and no aspiration with any of the  media ingested. A  limited view the esophagus with the patient in the  seated lateral position demonstrated no signs of a focal esophageal  stricture, or gastroesophageal reflux.          Impression:       Fluoroscopy provided for a modified barium swallow, and  limited upper GI series. Please see speech therapy report for full  details and recommendations. Limited upper GI series appeared within  normal limits.         This report was finalized on 1/23/2019 4:36 PM by Dr. Kamron Armas MD.       FL Video Swallow With Speech [[id number]] Collected:  01/23/19 1523     Updated:  01/23/19 1638    Narrative:       EXAMINATION: FL LIMITED UGI FOR MBS REFLUX-, FL VIDEO SWALLOW W SPEECH-     INDICATION: dysphagia; Z74.09-Other reduced mobility; R13.10-Dysphagia,  unspecified; Z74.09-Other reduced mobility     TECHNIQUE: 48 seconds of fluoroscopic time was used for this exam. 2  associated images were saved. The patient was evaluated in the seated  lateral position while taking a variety of consistencies of barium by  mouth under the direction of speech pathology.     COMPARISON: NONE     FINDINGS: There was no penetration and no aspiration with any of the  media ingested. A limited view the esophagus with the patient in the  seated lateral position demonstrated no signs of a focal esophageal  stricture, or gastroesophageal reflux.          Impression:       Fluoroscopy provided for a modified barium swallow, and  limited upper GI series. Please see speech therapy report for full  details and recommendations. Limited upper GI series appeared within  normal limits.         This report was finalized on 1/23/2019 4:36 PM by Dr. Kamron Armas MD.             Results for orders placed during the hospital encounter of 01/20/19   Adult Transthoracic Echo Complete W/ Cont if Necessary Per Protocol    Narrative Technically difficult study   1) Borderline LVH , moderate dilation of LV with mild reduction systolic   function ( EF is about 45%)  2) Moderate  left atrial enlargement with normal LVedp  3) Aortic sclerosis with mild AI   4) Mild MR   5) Mild TR with PAsp of 48 mm of hg   6) Mild dilation of the RV with normal RV Function   7) Severe hypokinesis of the inferior and septal walls            Discharge Details        Discharge Medications      New Medications      Instructions Start Date   acetaminophen 325 MG tablet  Commonly known as:  TYLENOL   650 mg, Oral, 4 Times Daily PRN      docusate sodium 100 MG capsule   100 mg, Oral, 2 Times Daily PRN      Acssandra Root 500 MG capsule   1 capsule, Oral, 3 Times Daily Before Meals      melatonin 5 MG sublingual tablet sublingual tablet   5 mg, Sublingual, Nightly      ondansetron 4 MG tablet  Commonly known as:  ZOFRAN   4 mg, Oral, Every 6 Hours PRN      pantoprazole 40 MG EC tablet  Commonly known as:  PROTONIX   40 mg, Oral, Every Morning Before Breakfast      polyethylene glycol pack packet  Commonly known as:  MIRALAX   17 g, Oral, Daily      tobramycin 0.3 % solution ophthalmic solution   1 drop, Both Eyes, Every 4 Hours Scheduled         Changes to Medications      Instructions Start Date   ALPRAZolam 0.5 MG tablet  Commonly known as:  XANAX  What changed:    · reasons to take this  · additional instructions   0.5 mg, Oral, Nightly PRN      atorvastatin 10 MG tablet  Commonly known as:  LIPITOR  What changed:    · medication strength  · how much to take  · when to take this   10 mg, Oral, Nightly      baclofen 10 MG tablet  Commonly known as:  LIORESAL  What changed:  Another medication with the same name was removed. Continue taking this medication, and follow the directions you see here.   TAKE ONE TABLET BY MOUTH THREE TIMES A DAY      fluticasone 50 MCG/ACT nasal spray  Commonly known as:  FLONASE  What changed:    · when to take this  · reasons to take this   1 spray, Nasal, Daily      predniSONE 10 MG tablet  Commonly known as:  DELTASONE  What changed:    · medication strength  · additional  instructions  · Another medication with the same name was removed. Continue taking this medication, and follow the directions you see here.   Taper as follows: 20 mg po daily x 2 days, then 15 mg daily x 2 days, then 10 mg po daily continuous (home dose daily)      albuterol (2.5 MG/3ML) 0.083% nebulizer solution  Commonly known as:  PROVENTIL  What changed:  Another medication with the same name was added. Make sure you understand how and when to take each.   2.5 mg, Nebulization, Every 4 Hours PRN      PROAIR  (90 Base) MCG/ACT inhaler  Generic drug:  albuterol sulfate HFA  What changed:  Another medication with the same name was added. Make sure you understand how and when to take each.   2 puffs, Inhalation, Every 4 Hours PRN      albuterol (2.5 MG/3ML) 0.083% nebulizer solution  Commonly known as:  PROVENTIL  What changed:  You were already taking a medication with the same name, and this prescription was added. Make sure you understand how and when to take each.   2.5 mg, Nebulization, Every 6 Hours - RT      spironolactone 25 MG tablet  Commonly known as:  ALDACTONE  What changed:    · how much to take  · how to take this  · when to take this   TAKE ONE TABLET BY MOUTH DAILY         Continue These Medications      Instructions Start Date   alfuzosin 10 MG 24 hr tablet  Commonly known as:  UROXATRAL   TAKE ONE TABLET BY MOUTH DAILY      aspirin 81 MG EC tablet   81 mg, Oral, Daily      BREO ELLIPTA 200-25 MCG/INH inhaler  Generic drug:  Fluticasone Furoate-Vilanterol   1 each, Inhalation, Daily      budesonide 0.5 MG/2ML nebulizer solution  Commonly known as:  PULMICORT   0.5 mg, Nebulization, 2 Times Daily - RT      calcium carbonate 600 MG tablet  Commonly known as:  OS-HAFSA   600 mg, Oral, 2 Times Daily With Meals      clopidogrel 75 MG tablet  Commonly known as:  PLAVIX   TAKE ONE TABLET BY MOUTH DAILY      escitalopram 10 MG tablet  Commonly known as:  LEXAPRO   10 mg, Oral, Daily      finasteride 5  MG tablet  Commonly known as:  PROSCAR   5 mg, Oral, Daily      furosemide 20 MG tablet  Commonly known as:  LASIX   TAKE ONE TABLET BY MOUTH DAILY      ipratropium-albuterol 0.5-2.5 mg/3 ml nebulizer  Commonly known as:  DUO-NEB   3 mL, Nebulization, Every 6 Hours - RT      iron polysaccharides 150 MG capsule  Commonly known as:  NIFEREX   150 mg, Oral, 2 Times Daily      metoprolol tartrate 25 MG tablet  Commonly known as:  LOPRESSOR   25 mg, Oral, Every 12 Hours Scheduled      mirtazapine 15 MG tablet  Commonly known as:  REMERON   15 mg, Oral, Nightly      multivitamin with minerals tablet tablet   1 tablet, Oral, Daily      NITROSTAT 0.4 MG SL tablet  Generic drug:  nitroglycerin   0.4 mg, Sublingual, Every 5 Minutes PRN      O2  Commonly known as:  OXYGEN   4 L/min, Inhalation, Once, Walking on 4L and sitting 3L      oxybutynin 5 MG tablet  Commonly known as:  DITROPAN   TAKE ONE TABLET BY MOUTH EVERY NIGHT AT BEDTIME      traMADol 50 MG tablet  Commonly known as:  ULTRAM   TAKE ONE TABLET BY MOUTH EVERY 8 HOURS AS NEEDED FOR MODERATE PAIN             Allergies   Allergen Reactions   • Mucomyst [Acetylcysteine] Other (See Comments)     bronchospasms   • Milk-Related Compounds Nausea And Vomiting   • Sulfa Antibiotics Nausea And Vomiting         Discharge Disposition:  Skilled Nursing Facility (DC - External)    Discharge Diet:  Diet Order   Procedures   • Diet Soft Texture; Whole Foods; Thin       Discharge Activity:  Activity Instructions     Activity as Tolerated      Measure Blood Pressure      Measure Weight            CODE STATUS:    Code Status and Medical Interventions:   Ordered at: 01/23/19 1905     Level Of Support Discussed With:    Patient     Code Status:    CPR     Medical Interventions (Level of Support Prior to Arrest):    Full         Future Appointments   Date Time Provider Department Center   3/8/2019 11:30 AM Roosevelt Lowe MD [facility] None   4/29/2019 11:15 AM Ulysses Bass MD [facility] None       Additional Instructions for the Follow-ups that You Need to Schedule     Ambulatory Referral to Home Health   As directed      Face to Face Visit Date:  1/25/2019    Follow-up Provider for Plan of Care?:  I treated the patient in an acute care facility and will not continue treatment after discharge.    Follow-up Provider:  MIGUEL ROJAS [[id number]]    Reason/Clinical Findings:  Ventricular tachycardia    Describe mobility limitations that make leaving home difficult:  impaired physical mobility, generalized weakness and debility    Nursing/Therapeutic Services Requested:  Skilled Nursing Physical Therapy Occupational Therapy    Skilled nursing orders:  Cardiopulmonary assessments Medication education O2 instruction    PT orders:  Gait Training Strengthening    Weight Bearing Status:  As Tolerated    Occupational orders:  Activities of daily living Energy conservation Strengthening    Frequency:  1 Week 1         Discharge Follow-up with PCP   As directed       Currently Documented PCP:    Miguel Rojas MD    PCP Phone Number:    [phone]     Follow Up Details:  To be seen by provider at SNF in 24-48 hrs, PCP 1 week of dc         Discharge Follow-up with Specialty: Dr Encinas (EP provider) in 1 week for wound ck--then in 3 months for re-eval with PPM interogation planned per notes.---PLEASE make BOTH appts for pt prior to transfer today.   As directed      Specialty:  Dr Encinas (EP provider) in 1 week for wound ck--then in 3 months for re-eval with PPM interogation planned per notes.---PLEASE make BOTH appts for pt prior to transfer today.         Discharge Follow-up with Specified Provider: [facility] in 2-4 weeks for re-eval  (will need repeat EGD in near future per GI notes)--please make f/u appt today before dc   As directed      To:  [facility] in 2-4 weeks for re-eval  (will need repeat EGD in near future per GI notes)--please make f/u appt today before dc         Discharge Follow-up with Specified  Provider: Cardiology f/u per their recs   As directed      To:  Cardiology f/u per their recs                 Time Spent on Discharge:  55 minutes    Electronically signed by PAL Perez, 02/05/19, 2:03 PM.      Electronically signed by Kizzy Biggs APRN at 2/5/2019  2:14 PM

## 2019-02-06 RX ORDER — ALPRAZOLAM 0.5 MG/1
0.5 TABLET ORAL NIGHTLY
Qty: 30 TABLET | Refills: 0 | Status: SHIPPED | OUTPATIENT
Start: 2019-02-06 | End: 2019-08-31 | Stop reason: HOSPADM

## 2019-02-06 RX ORDER — TRAMADOL HYDROCHLORIDE 50 MG/1
50 TABLET ORAL EVERY 8 HOURS PRN
Qty: 30 TABLET | Refills: 5 | Status: ON HOLD | OUTPATIENT
Start: 2019-02-06 | End: 2019-08-29

## 2019-02-08 ENCOUNTER — NURSING HOME (OUTPATIENT)
Dept: INTERNAL MEDICINE | Facility: CLINIC | Age: 69
End: 2019-02-08

## 2019-02-08 VITALS
DIASTOLIC BLOOD PRESSURE: 72 MMHG | WEIGHT: 162.8 LBS | HEIGHT: 68 IN | BODY MASS INDEX: 24.67 KG/M2 | TEMPERATURE: 97.4 F | HEART RATE: 82 BPM | RESPIRATION RATE: 18 BRPM | SYSTOLIC BLOOD PRESSURE: 126 MMHG | OXYGEN SATURATION: 98 %

## 2019-02-08 DIAGNOSIS — Z95.0 CARDIAC PACEMAKER IN SITU: ICD-10-CM

## 2019-02-08 DIAGNOSIS — I25.119 CORONARY ARTERY DISEASE INVOLVING NATIVE CORONARY ARTERY OF NATIVE HEART WITH ANGINA PECTORIS (HCC): ICD-10-CM

## 2019-02-08 DIAGNOSIS — M54.16 LUMBAR RADICULOPATHY: ICD-10-CM

## 2019-02-08 DIAGNOSIS — I48.0 PAROXYSMAL ATRIAL FIBRILLATION (HCC): ICD-10-CM

## 2019-02-08 DIAGNOSIS — I10 ESSENTIAL HYPERTENSION: ICD-10-CM

## 2019-02-08 DIAGNOSIS — I49.5 SICK SINUS SYNDROME (HCC): ICD-10-CM

## 2019-02-08 DIAGNOSIS — J44.9 STEROID-DEPENDENT COPD (HCC): Primary | ICD-10-CM

## 2019-02-08 PROCEDURE — 99305 1ST NF CARE MODERATE MDM 35: CPT | Performed by: INTERNAL MEDICINE

## 2019-02-08 NOTE — PROGRESS NOTES
Nursing Home Progress Note        Sahil Gallego DO ?  Anna Contreras, APRN ?  [address], [place] [address]  Phone: [phone]  Fax: [phone] Janice Garcia MD ?  Lio Snell DO [x]   [address] [place] [address]  Phone: [phone]  Fax: [phone]     PATIENT NAME: Jani Pena                                                                          YOB: 1950           DATE OF SERVICE: 02/08/2019  FACILITY:  [x] [place]   [] [place]   [] [facility]   [] [facility]   [] Other ______________________________________________________________________     CHIEF COMPLAINT:  Debility / Weakness      HISTORY OF PRESENT ILLNESS:   Patient is a pleasant 68-year-old white male with a history of COPD, sick sinus syndrome, chronic back pain, and overactive bladder who was transferred to [facility] from [facility] after a fall and evaluation in [facility] ER.  In the ER he was treated for head injury with laceration but fall work up revealed elevated cardiac enzymes and arrhythmia.  Patient was transferred to [facility] for further treatment and eventually was given a pacemaker for SSS with syncopal episode.  He has since been feeling well and denie any chest pain, SOA, or difficulty with working with PT.      PAST MEDICAL & SURGICAL HISTORY:   Past Medical History:   Diagnosis Date   • Abnormal liver enzymes    • Anemia    • Anxiety    • Arthritis    • Atherosclerotic heart disease of native coronary artery without angina pectoris    • Atrial fibrillation (CMS/HCC)    • Back pain    • BPH (benign prostatic hyperplasia)    • Cataract, bilateral    • Chest pain     2-3 MONTHS AGO.  PER PT, HAS ADDRESSED WITH CARDIOLOGIST.  STATES HAS NTG PRN.   • CHF (congestive heart failure) (CMS/HCC)    • Chronic bronchitis (CMS/HCC)    • Chronic kidney disease    • COPD (chronic obstructive pulmonary disease) (CMS/HCC)    • Degeneration of cervical intervertebral  disc    • Depression    • Diverticulosis    • Dyspnea    • Esophageal reflux    • Esophagitis    • Gastritis    • Hematuria    • Hemorrhoids    • Hiatal hernia    • History of arthritis    • History of nuclear stress test     UNSURE OF DATE   • History of peripheral neuropathy    • History of ventricular septal defect    • History of ventricular septal defect    • Hyperlipidemia    • Hypertension    • Infectious diarrhea    • Kyphosis, acquired    • Lumbar radiculopathy    • Mitral and aortic valve disease    • Nephrolithiasis    • Phimosis    • Problems with swallowing     WITH FOOD   • Respiratory failure (CMS/HCC)    • Sleep apnea     BIPAP   • TIA (transient ischemic attack)     X3.   2014   • Ventral hernia    • Wears glasses     FOR READING      Past Surgical History:   Procedure Laterality Date   • CARDIAC CATHETERIZATION     • CARDIAC CATHETERIZATION N/A 5/24/2018    Procedure: Left Heart Cath;  Surgeon: Edouard Robertson MD;  Location:  [facility]ASIVE [facility];  Service: Cardiovascular   • CARDIAC ELECTROPHYSIOLOGY PROCEDURE N/A 1/31/2019    Procedure: PACEMAKER IMPLANTATION- DC;  Surgeon: Omar Encinas DO;  Location:  [facility] EP INVASIVE LOCATION;  Service: Cardiology   • ENDOSCOPY N/A 1/26/2019    Procedure: ESOPHAGOGASTRODUODENOSCOPY;  Surgeon: Brunner, Mark I, MD;  Location:  [facility];  Service: Gastroenterology   • HERNIA REPAIR     • ORTHOPEDIC SURGERY Left     Left hip arthroplasty   • SUPRAPUBIC CATHETER INSERTION      History of Percutaneous Catheter Placement Into Ureter   • THROAT SURGERY      FOR SLEEP APNEA   • VSD REPAIR      History of VSD Repair By Patch Closure         MEDICATIONS:  I have reviewed and reconciled the patients medication list in the patients chart at the [facility] nursing facility today.      ALLERGIES:  Allergies   Allergen Reactions   • Mucomyst [Acetylcysteine] Other (See Comments)     bronchospasms   • Milk-Related Compounds Nausea And Vomiting   • Sulfa  Antibiotics Nausea And Vomiting         SOCIAL HISTORY:  Social History     Socioeconomic History   • Marital status:      Spouse name: Not on file   • Number of children: Not on file   • Years of education: Not on file   • Highest education level: Not on file   Social Needs   • Financial resource strain: Not on file   • Food insecurity - worry: Not on file   • Food insecurity - inability: Not on file   • Transportation needs - medical: Not on file   • Transportation needs - non-medical: Not on file   Occupational History     Employer: RETIRED   Tobacco Use   • Smoking status: Former Smoker     Packs/day: 3.00     Years: 30.00     Pack years: 90.00     Types: Cigarettes     Last attempt to quit: 2017     Years since quittin.1   • Smokeless tobacco: Never Used   Substance and Sexual Activity   • Alcohol use: No   • Drug use: No   • Sexual activity: Defer   Other Topics Concern   • Not on file   Social History Narrative   • Not on file       FAMILY HISTORY:  Family History   Problem Relation Age of Onset   • Other Father         CABG   • Coronary artery disease Father        REVIEW OF SYSTEMS:  Review of Systems   Constitutional: Negative for chills, fatigue and fever.   HENT: Negative for congestion, ear pain, rhinorrhea, sinus pressure and sore throat.    Eyes: Negative for visual disturbance.   Respiratory: Negative for cough, chest tightness, shortness of breath and wheezing.    Cardiovascular: Negative for chest pain, palpitations and leg swelling.   Gastrointestinal: Negative for abdominal pain, blood in stool, constipation, diarrhea, nausea and vomiting.   Endocrine: Negative for polydipsia and polyuria.   Genitourinary: Negative for dysuria and hematuria.   Musculoskeletal: Negative for arthralgias and back pain.   Skin: Negative for rash.   Neurological: Negative for dizziness, light-headedness, numbness and headaches.   Psychiatric/Behavioral: Negative for dysphoric mood and sleep disturbance. The  "patient is not nervous/anxious.           PHYSICAL EXAMINATION:     VITAL SIGNS:  /72   Pulse 82   Temp 97.4 °F (36.3 °C)   Resp 18   Ht 172.7 cm (68\")   Wt 73.8 kg (162 lb 12.8 oz)   SpO2 98%   BMI 24.75 kg/m²     Physical Exam   Constitutional: He is oriented to person, place, and time. He appears well-developed and well-nourished.   Chronically ill appearing.   HENT:   Head: Normocephalic and atraumatic.   Mouth/Throat: Oropharynx is clear and moist. No oropharyngeal exudate.   Eyes: Conjunctivae and EOM are normal. Pupils are equal, round, and reactive to light. No scleral icterus.   Neck: Normal range of motion. Neck supple. No thyromegaly present.   Cardiovascular: Normal rate, regular rhythm and normal heart sounds. Exam reveals no gallop and no friction rub.   No murmur heard.  Pulmonary/Chest: Effort normal and breath sounds normal. No respiratory distress. He has no wheezes.   2L NC in place   Abdominal: Soft. Bowel sounds are normal. He exhibits no distension. There is no tenderness.   Musculoskeletal: Normal range of motion.   Lymphadenopathy:     He has no cervical adenopathy.   Neurological: He is alert and oriented to person, place, and time.   Skin: Skin is warm and dry. No rash noted.   Psychiatric: He has a normal mood and affect. His behavior is normal.   Nursing note and vitals reviewed.      RECORDS REVIEW:   [facility] Discharge Summary    ASSESSMENT     Diagnoses and all orders for this visit:    Steroid-dependent COPD (CMS/HCC)    Essential hypertension    Lumbar radiculopathy    Sick sinus syndrome (CMS/HCC)    Cardiac pacemaker in situ    Coronary artery disease involving native coronary artery of native heart with angina pectoris (CMS/HCC)    Paroxysmal atrial fibrillation (CMS/HCC)        PLAN    69 yo W M admitted for short term rehab.    1. SSS s/p PM placement / CAD / Afib  - stable on current medications.   - follow up with cardiology as scheduled  - " continue PT for strengthening with goals to return home with HH.    2. Essential Hypertension   - stable, cont current medications.     4. COPD with hypoxia and steroid dependence  - stable on current medications.  At baseline for O2 requirements    5. Debility / Weakness  - cont PT and supportive care in nursing facility.    [x]  Discussed Patient in detail with nursing/staff, addressed all needs today.     [x]  Plan of Care Reviewed   [x]  PT/OT Reviewed   []  Order Changes  []  Discharge Plans Reviewed  [x]  Advance Directive on file with Nursing Home.   [x]  POA on file with Nursing Home.    [x]  Code Status listed and reviewed.          Lio Snell DO.  2/8/2019

## 2019-02-13 ENCOUNTER — TELEPHONE (OUTPATIENT)
Dept: CARDIOLOGY | Facility: CLINIC | Age: 69
End: 2019-02-13

## 2019-02-13 NOTE — TELEPHONE ENCOUNTER
Pt called to report he had another episode of near syncope while sitting in bed. He has been wearing O2 and is currently in rehab at [facility] in [place]. Wound check done today however device not checked. Will schedule pt to have device checked on Friday in [place] (pt cannot get to [place]). [facility] device check only for 2/15/19 @ 2:30. Pt has remote transmission box however it is not set up yet due to admission to rehab facility. Will await check and pt to call should further episodes recur. JACK

## 2019-02-14 ENCOUNTER — TELEPHONE (OUTPATIENT)
Dept: CARDIOLOGY | Facility: CLINIC | Age: 69
End: 2019-02-14

## 2019-02-14 NOTE — TELEPHONE ENCOUNTER
Called and left message for pt to call for assistance on hooking up his [facility] home monitor.  Scheduling and Dr Bass's nurse asked me to call patient for assistance.

## 2019-02-16 ENCOUNTER — CLINICAL SUPPORT NO REQUIREMENTS (OUTPATIENT)
Dept: CARDIOLOGY | Facility: CLINIC | Age: 69
End: 2019-02-16

## 2019-02-16 DIAGNOSIS — I49.5 SICK SINUS SYNDROME (HCC): Primary | ICD-10-CM

## 2019-02-18 ENCOUNTER — NURSING HOME (OUTPATIENT)
Dept: INTERNAL MEDICINE | Facility: CLINIC | Age: 69
End: 2019-02-18

## 2019-02-18 ENCOUNTER — OFFICE VISIT (OUTPATIENT)
Dept: CARDIOLOGY | Facility: CLINIC | Age: 69
End: 2019-02-18

## 2019-02-18 DIAGNOSIS — I25.10 CORONARY ARTERY DISEASE INVOLVING NATIVE CORONARY ARTERY OF NATIVE HEART WITHOUT ANGINA PECTORIS: Primary | ICD-10-CM

## 2019-02-18 DIAGNOSIS — I50.32 CHRONIC DIASTOLIC CONGESTIVE HEART FAILURE (HCC): ICD-10-CM

## 2019-02-18 DIAGNOSIS — Z78.9 IMPAIRED MOBILITY AND ADLS: ICD-10-CM

## 2019-02-18 DIAGNOSIS — J43.1 PANLOBULAR EMPHYSEMA (HCC): ICD-10-CM

## 2019-02-18 DIAGNOSIS — J44.9 STEROID-DEPENDENT COPD (HCC): Primary | ICD-10-CM

## 2019-02-18 DIAGNOSIS — E78.2 MIXED HYPERLIPIDEMIA: ICD-10-CM

## 2019-02-18 DIAGNOSIS — Z74.09 IMPAIRED MOBILITY AND ADLS: ICD-10-CM

## 2019-02-18 DIAGNOSIS — I10 ESSENTIAL HYPERTENSION: ICD-10-CM

## 2019-02-18 PROCEDURE — 99309 SBSQ NF CARE MODERATE MDM 30: CPT | Performed by: PHYSICIAN ASSISTANT

## 2019-02-18 PROCEDURE — 99214 OFFICE O/P EST MOD 30 MIN: CPT | Performed by: INTERNAL MEDICINE

## 2019-02-23 VITALS
DIASTOLIC BLOOD PRESSURE: 73 MMHG | RESPIRATION RATE: 24 BRPM | SYSTOLIC BLOOD PRESSURE: 129 MMHG | OXYGEN SATURATION: 97 % | TEMPERATURE: 98.2 F | HEART RATE: 109 BPM

## 2019-02-23 NOTE — PROGRESS NOTES
Nursing Home Progress Note        Sahil Gallego, DO ?  Anna Contreras, APRN ?  [address], [place] [address]  Phone: [phone]  Fax: [phone] Janice Garcia MD ?  Lio Snell, DO ?  Dacia Vanessa PA-C [x]   [address] [place] [address]  Phone: [phone]  Fax: [phone]     PATIENT NAME: Jani Pena                                                                          YOB: 1950           DATE OF SERVICE: 02/18/2019  FACILITY:  [x] [facility]   [] [place]   [] [facility]   [] [facility]   [] Other ______________________________________________________________________     CHIEF COMPLAINT:  Cough      HISTORY OF PRESENT ILLNESS:   Mr. Pena is a 68-year of age male patient with history of COPD, sick sinus syndrome, chronic kidney disease, and hyperlipidemia.  He presents today for follow-up on cough.  Patient is currently taking Levaquin, prednisone daily at 10 mg, duo nebs, and albuterol treatments.  He is also using Mucinex.  He continues to have cough with moderate sputum production.  Chest x-ray obtained on 2/17 shows hyperinflation no infiltrates.  Staff deny any increased O2 demands.  Patient denies fevers,  denies any dizziness, chest pain, or diaphoresis.  He reports mild shortness of breath during coughing spells.  Otherwise he is doing well.  Reports good appetite, has been going to follow-up doctor's appointments.  He continues to participate in scheduled PT/OT.  He anticipates that he will be discharged home sometime next week.    PAST MEDICAL & SURGICAL HISTORY:   Past Medical History:   Diagnosis Date   • Abnormal liver enzymes    • Anemia    • Anxiety    • Arthritis    • Atherosclerotic heart disease of native coronary artery without angina pectoris    • Atrial fibrillation (CMS/HCC)    • Back pain    • BPH (benign prostatic hyperplasia)    • Cataract, bilateral    • Chest pain     2-3 MONTHS AGO.  PER PT, HAS ADDRESSED WITH CARDIOLOGIST.   STATES HAS NTG PRN.   • CHF (congestive heart failure) (CMS/HCC)    • Chronic bronchitis (CMS/HCC)    • Chronic kidney disease    • COPD (chronic obstructive pulmonary disease) (CMS/HCC)    • Degeneration of cervical intervertebral disc    • Depression    • Diverticulosis    • Dyspnea    • Esophageal reflux    • Esophagitis    • Gastritis    • Hematuria    • Hemorrhoids    • Hiatal hernia    • History of arthritis    • History of nuclear stress test     UNSURE OF DATE   • History of peripheral neuropathy    • History of ventricular septal defect    • History of ventricular septal defect    • Hyperlipidemia    • Hypertension    • Infectious diarrhea    • Kyphosis, acquired    • Lumbar radiculopathy    • Mitral and aortic valve disease    • Nephrolithiasis    • Phimosis    • Problems with swallowing     WITH FOOD   • Respiratory failure (CMS/HCC)    • Sleep apnea     BIPAP   • TIA (transient ischemic attack)     X3.   2014   • Ventral hernia    • Wears glasses     FOR READING      Past Surgical History:   Procedure Laterality Date   • CARDIAC CATHETERIZATION     • CARDIAC CATHETERIZATION N/A 5/24/2018    Procedure: Left Heart Cath;  Surgeon: Edouard Robertson MD;  Location:  [facility] CATH INVASIVE LOCATION;  Service: Cardiovascular   • CARDIAC ELECTROPHYSIOLOGY PROCEDURE N/A 1/31/2019    Procedure: PACEMAKER IMPLANTATION- DC;  Surgeon: Omar Encinas DO;  Location:  [facility] EP INVASIVE LOCATION;  Service: Cardiology   • ENDOSCOPY N/A 1/26/2019    Procedure: ESOPHAGOGASTRODUODENOSCOPY;  Surgeon: Brunner, Mark I, MD;  Location:  [facility];  Service: Gastroenterology   • HERNIA REPAIR     • ORTHOPEDIC SURGERY Left     Left hip arthroplasty   • SUPRAPUBIC CATHETER INSERTION      History of Percutaneous Catheter Placement Into Ureter   • THROAT SURGERY      FOR SLEEP APNEA   • VSD REPAIR      History of VSD Repair By Patch Closure         MEDICATIONS:  I have reviewed and reconciled the patients medication list in the  patients chart at the skilled nursing facility today.      ALLERGIES:  Allergies   Allergen Reactions   • Mucomyst [Acetylcysteine] Other (See Comments)     bronchospasms   • Milk-Related Compounds Nausea And Vomiting   • Sulfa Antibiotics Nausea And Vomiting         SOCIAL HISTORY:  Social History     Socioeconomic History   • Marital status:      Spouse name: Not on file   • Number of children: Not on file   • Years of education: Not on file   • Highest education level: Not on file   Social Needs   • Financial resource strain: Not on file   • Food insecurity - worry: Not on file   • Food insecurity - inability: Not on file   • Transportation needs - medical: Not on file   • Transportation needs - non-medical: Not on file   Occupational History     Employer: RETIRED   Tobacco Use   • Smoking status: Former Smoker     Packs/day: 3.00     Years: 30.00     Pack years: 90.00     Types: Cigarettes     Last attempt to quit: 2017     Years since quittin.1   • Smokeless tobacco: Never Used   Substance and Sexual Activity   • Alcohol use: No   • Drug use: No   • Sexual activity: Defer   Other Topics Concern   • Not on file   Social History Narrative   • Not on file       FAMILY HISTORY:  Family History   Problem Relation Age of Onset   • Other Father         CABG   • Coronary artery disease Father        REVIEW OF SYSTEMS:  Review of Systems   Constitutional: Negative for activity change, appetite change, chills, fatigue and fever.   HENT: Negative.  Negative for congestion and sore throat.    Respiratory: Positive for cough, shortness of breath and wheezing.    Cardiovascular: Negative for chest pain, palpitations and leg swelling.   Gastrointestinal: Negative for abdominal pain, constipation, diarrhea, nausea and vomiting.   Genitourinary: Negative for dysuria, frequency, hematuria and urgency.   Musculoskeletal: Positive for arthralgias and back pain ( Chronic).   Neurological: Negative for dizziness, weakness  and light-headedness.   Psychiatric/Behavioral: Negative for behavioral problems and sleep disturbance.      PHYSICAL EXAMINATION:     VITAL SIGNS:  /73   Pulse 109   Temp 98.2 °F (36.8 °C)   Resp 24   SpO2 97%     Physical Exam   Constitutional: He appears well-developed and well-nourished. No distress.   Chronically ill-appearing male patient sitting upright in wheelchair.  He is talkative in conversation and pleasant.  He has several coughing episodes during exam.   HENT:   Head: Normocephalic and atraumatic.   Eyes: Conjunctivae and EOM are normal. Pupils are equal, round, and reactive to light.   Neck: Normal range of motion. Neck supple. No JVD present.   Cardiovascular: Normal rate, regular rhythm, normal heart sounds and intact distal pulses.   Pulmonary/Chest: Effort normal. No respiratory distress. He has wheezes. He has no rales. He exhibits no tenderness.   Abdominal: Soft. Bowel sounds are normal. He exhibits no distension. There is no rebound.   Multiple bruises noted to abdomen.  Patient reports this is where he receives his blood thinner injections.  Some mild tenderness with palpation.   Musculoskeletal: Normal range of motion. He exhibits no edema.   Lymphadenopathy:     He has no cervical adenopathy.   Neurological: He is alert.   Skin: Skin is warm and dry. Capillary refill takes less than 2 seconds.   Psychiatric: He has a normal mood and affect. His behavior is normal.   Nursing note and vitals reviewed.      RECORDS REVIEW:   I have reviewed and interpreted the following radiology studies today.  12/17 chest x-ray shows hyperinflation no infiltrates.    ASSESSMENT     Diagnoses and all orders for this visit:    Steroid-dependent COPD (CMS/HCC)    Essential hypertension    Chronic diastolic congestive heart failure (CMS/HCC)    Impaired mobility and ADLs    PLAN  COPD: Increased cough is most likely related to underlying COPD.  There are no reports of increased oxygen demands.  We  will initiate 40 mg prednisone for 5 days then taper back down to 10 mg daily.  Continue use of DuoNeb's and albuterol treatments, Mucinex as needed.  Chest x-ray on 1217 shows hyperinflation no infiltrates.  Will hold off on any additional imaging at this time, as patient is completing Levaquin.  Hypertension: Stable.  Continue current medication.  CHF: Patient clinically stable, no signs of decompensation.  We will continue to monitor.  Continue regular follow-up appointments with cardiology.  Impaired mobility: Patient continues to rely on wheelchair regularly.  Continue supportive care at nursing facility with ADLs.    [x]  Discussed Patient in detail with nursing/staff, addressed all needs today.     [x]  Plan of Care Reviewed   []  PT/OT Reviewed   []  Order Changes  []  Discharge Plans Reviewed  [x]  Advance Directive on file with Nursing Home.   [x]  POA on file with Nursing Home.    [x]  Code Status listed:  []  Full Code   [x]  DNR         Dacia Vanessa PA-C.  2/23/2019

## 2019-02-25 RX ORDER — SPIRONOLACTONE 25 MG/1
TABLET ORAL
Qty: 30 TABLET | Refills: 2 | Status: SHIPPED | OUTPATIENT
Start: 2019-02-25 | End: 2019-08-31 | Stop reason: HOSPADM

## 2019-02-25 RX ORDER — BACLOFEN 10 MG/1
TABLET ORAL
Qty: 90 TABLET | Refills: 0 | Status: SHIPPED | OUTPATIENT
Start: 2019-02-25 | End: 2019-04-29 | Stop reason: SDUPTHER

## 2019-02-25 RX ORDER — ALFUZOSIN HYDROCHLORIDE 10 MG/1
TABLET, EXTENDED RELEASE ORAL
Qty: 30 TABLET | Refills: 1 | Status: SHIPPED | OUTPATIENT
Start: 2019-02-25 | End: 2019-05-24 | Stop reason: SDUPTHER

## 2019-02-28 ENCOUNTER — TELEPHONE (OUTPATIENT)
Dept: INTERNAL MEDICINE | Facility: CLINIC | Age: 69
End: 2019-02-28

## 2019-03-08 ENCOUNTER — OUTSIDE FACILITY SERVICE (OUTPATIENT)
Dept: HOSPITALIST | Facility: HOSPITAL | Age: 69
End: 2019-03-08

## 2019-03-08 ENCOUNTER — OFFICE VISIT (OUTPATIENT)
Dept: INTERNAL MEDICINE | Facility: CLINIC | Age: 69
End: 2019-03-08

## 2019-03-08 ENCOUNTER — HOSPITAL ENCOUNTER (OUTPATIENT)
Dept: GENERAL RADIOLOGY | Facility: HOSPITAL | Age: 69
Discharge: HOME | End: 2019-03-08
Admitting: INTERNAL MEDICINE

## 2019-03-08 VITALS
TEMPERATURE: 97.8 F | OXYGEN SATURATION: 92 % | WEIGHT: 173 LBS | HEIGHT: 68 IN | BODY MASS INDEX: 26.22 KG/M2 | SYSTOLIC BLOOD PRESSURE: 138 MMHG | HEART RATE: 78 BPM | DIASTOLIC BLOOD PRESSURE: 78 MMHG

## 2019-03-08 DIAGNOSIS — D64.9 ANEMIA, UNSPECIFIED TYPE: ICD-10-CM

## 2019-03-08 DIAGNOSIS — M54.16 LUMBAR RADICULOPATHY: ICD-10-CM

## 2019-03-08 DIAGNOSIS — E11.22 TYPE 2 DIABETES MELLITUS WITH STAGE 3 CHRONIC KIDNEY DISEASE, WITHOUT LONG-TERM CURRENT USE OF INSULIN (HCC): ICD-10-CM

## 2019-03-08 DIAGNOSIS — Z95.0 CARDIAC PACEMAKER IN SITU: ICD-10-CM

## 2019-03-08 DIAGNOSIS — N18.30 TYPE 2 DIABETES MELLITUS WITH STAGE 3 CHRONIC KIDNEY DISEASE, WITHOUT LONG-TERM CURRENT USE OF INSULIN (HCC): ICD-10-CM

## 2019-03-08 DIAGNOSIS — N18.30 CHRONIC KIDNEY DISEASE, STAGE III (MODERATE) (HCC): ICD-10-CM

## 2019-03-08 DIAGNOSIS — I50.32 CHRONIC DIASTOLIC CONGESTIVE HEART FAILURE (HCC): ICD-10-CM

## 2019-03-08 DIAGNOSIS — I48.0 PAROXYSMAL ATRIAL FIBRILLATION (HCC): Primary | ICD-10-CM

## 2019-03-08 DIAGNOSIS — J44.9 STEROID-DEPENDENT COPD (HCC): ICD-10-CM

## 2019-03-08 DIAGNOSIS — K21.9 GERD WITHOUT ESOPHAGITIS: ICD-10-CM

## 2019-03-08 DIAGNOSIS — R13.19 ESOPHAGEAL DYSPHAGIA: ICD-10-CM

## 2019-03-08 DIAGNOSIS — I49.5 SICK SINUS SYNDROME (HCC): ICD-10-CM

## 2019-03-08 DIAGNOSIS — I25.119 CORONARY ARTERY DISEASE INVOLVING NATIVE CORONARY ARTERY OF NATIVE HEART WITH ANGINA PECTORIS (HCC): ICD-10-CM

## 2019-03-08 PROBLEM — S01.01XA SCALP LACERATION: Status: RESOLVED | Noted: 2019-01-22 | Resolved: 2019-03-08

## 2019-03-08 PROCEDURE — G0180 MD CERTIFICATION HHA PATIENT: HCPCS | Performed by: INTERNAL MEDICINE

## 2019-03-08 PROCEDURE — 99214 OFFICE O/P EST MOD 30 MIN: CPT | Performed by: INTERNAL MEDICINE

## 2019-03-08 PROCEDURE — 71046 X-RAY EXAM CHEST 2 VIEWS: CPT

## 2019-03-08 RX ORDER — BENZONATATE 200 MG/1
200 CAPSULE ORAL 3 TIMES DAILY PRN
Qty: 40 CAPSULE | Refills: 1 | Status: SHIPPED | OUTPATIENT
Start: 2019-03-08 | End: 2019-03-27

## 2019-03-08 RX ORDER — DOXYCYCLINE HYCLATE 100 MG/1
100 CAPSULE ORAL 2 TIMES DAILY
Qty: 30 CAPSULE | Refills: 0 | Status: SHIPPED | OUTPATIENT
Start: 2019-03-08 | End: 2019-04-02

## 2019-03-08 NOTE — PROGRESS NOTES
Subjective   Jani Pena is a 68 y.o. male.     Chief Complaint   Patient presents with   • Follow-up       History of Present Illness   Patient here for follow-up.  Recent COPD exacerbation hospitalized.  Recent discontinuation of Levaquin patient states cough is getting worse and greenish sputum patient is on steroid oxygen.  Today's O2 sat 89% on 2 L.  No significant short of breath.  No fever chills.  Also had a recent pacemaker placed due to sick sinus syndrome.  Patient also complains some swallow problem upper GI showed no significant stenosis.  Diabetes is stable now.  Low back pain stable.  Chronic kidney disease is stable.  Hemoglobin still low.    Current Outpatient Medications:   •  acetaminophen (TYLENOL) 325 MG tablet, Take 2 tablets by mouth 4 (Four) Times a Day As Needed for Mild Pain , Headache or Fever., Disp: , Rfl:   •  albuterol (PROVENTIL) (2.5 MG/3ML) 0.083% nebulizer solution, Take 2.5 mg by nebulization Every 4 (Four) Hours As Needed for Wheezing., Disp: , Rfl:   •  albuterol (PROVENTIL) (2.5 MG/3ML) 0.083% nebulizer solution, Take 2.5 mg by nebulization Every 6 (Six) Hours., Disp: , Rfl: 12  •  alfuzosin (UROXATRAL) 10 MG 24 hr tablet, TAKE ONE TABLET BY MOUTH DAILY, Disp: 30 tablet, Rfl: 1  •  ALPRAZolam (XANAX) 0.5 MG tablet, Take 1 tablet by mouth Every Night., Disp: 30 tablet, Rfl: 0  •  aspirin 81 MG EC tablet, Take 1 tablet by mouth Daily., Disp: 90 tablet, Rfl: 3  •  atorvastatin (LIPITOR) 10 MG tablet, Take 1 tablet by mouth Every Night., Disp: , Rfl:   •  baclofen (LIORESAL) 10 MG tablet, TAKE ONE TABLET BY MOUTH THREE TIMES A DAY, Disp: 90 tablet, Rfl: 0  •  BREO ELLIPTA 200-25 MCG/INH aerosol powder , Inhale 1 each Daily., Disp: 60 each, Rfl: 5  •  budesonide (PULMICORT) 0.5 MG/2ML nebulizer solution, Take 2 mL by nebulization 2 (Two) Times a Day., Disp: , Rfl:   •  calcium carbonate (OS-HAFSA) 600 MG tablet, Take 600 mg by mouth 2 (Two) Times a Day With Meals., Disp: , Rfl:    •  clopidogrel (PLAVIX) 75 MG tablet, TAKE ONE TABLET BY MOUTH DAILY, Disp: 30 tablet, Rfl: 4  •  docusate sodium 100 MG capsule, Take 100 mg by mouth 2 (Two) Times a Day As Needed for Constipation., Disp: , Rfl:   •  escitalopram (LEXAPRO) 10 MG tablet, Take 1 tablet by mouth Daily., Disp: 90 tablet, Rfl: 3  •  finasteride (PROSCAR) 5 MG tablet, Take 5 mg by mouth Daily., Disp: , Rfl:   •  fluticasone (FLONASE) 50 MCG/ACT nasal spray, 1 spray into each nostril Daily. (Patient taking differently: 1 spray into the nostril(s) as directed by provider Daily As Needed.), Disp: 16 g, Rfl: 5  •  furosemide (LASIX) 20 MG tablet, TAKE ONE TABLET BY MOUTH DAILY, Disp: 30 tablet, Rfl: 0  •  Ginger, Zingiber officinalis, (GINGER ROOT) 500 MG capsule, Take 500 mg by mouth 3 (Three) Times a Day Before Meals., Disp: , Rfl:   •  ipratropium-albuterol (DUO-NEB) 0.5-2.5 mg/mL nebulizer, Take 3 mL by nebulization Every 6 (Six) Hours., Disp: 360 mL, Rfl:   •  iron polysaccharides (NIFEREX) 150 MG capsule, Take 150 mg by mouth 2 (Two) Times a Day., Disp: , Rfl:   •  melatonin 5 MG sublingual tablet sublingual tablet, Place 1 tablet under the tongue Every Night., Disp: , Rfl:   •  metoprolol tartrate (LOPRESSOR) 25 MG tablet, Take 1 tablet by mouth Every 12 (Twelve) Hours., Disp: 180 tablet, Rfl: 3  •  mirtazapine (REMERON) 15 MG tablet, Take 15 mg by mouth Every Night., Disp: , Rfl:   •  Multiple Vitamins-Minerals (MULTIVITAMIN WITH MINERALS) tablet tablet, Take 1 tablet by mouth Daily., Disp: , Rfl:   •  nitroglycerin (NITROSTAT) 0.4 MG SL tablet, Place 0.4 mg under the tongue Every 5 (Five) Minutes As Needed., Disp: , Rfl:   •  O2 (OXYGEN), Inhale 4 L/min 1 (One) Time. Walking on 4L and sitting 3L, Disp: , Rfl:   •  ondansetron (ZOFRAN) 4 MG tablet, Take 1 tablet by mouth Every 6 (Six) Hours As Needed for Nausea or Vomiting., Disp: , Rfl:   •  oxybutynin (DITROPAN) 5 MG tablet, TAKE ONE TABLET BY MOUTH EVERY NIGHT AT BEDTIME, Disp:  30 tablet, Rfl: 3  •  pantoprazole (PROTONIX) 40 MG EC tablet, Take 1 tablet by mouth Every Morning Before Breakfast., Disp: , Rfl:   •  polyethylene glycol (MIRALAX) pack packet, Take 17 g by mouth Daily., Disp: , Rfl:   •  predniSONE (DELTASONE) 10 MG tablet, Taper as follows: 20 mg po daily x 2 days, then 15 mg daily x 2 days, then 10 mg po daily continuous (home dose daily), Disp: , Rfl:   •  PROAIR  (90 BASE) MCG/ACT inhaler, Inhale 2 puffs Every 4 (Four) Hours As Needed for Wheezing or Shortness of Air., Disp: , Rfl:   •  spironolactone (ALDACTONE) 25 MG tablet, TAKE ONE TABLET BY MOUTH DAILY, Disp: 30 tablet, Rfl: 2  •  traMADol (ULTRAM) 50 MG tablet, Take 1 tablet by mouth Every 8 (Eight) Hours As Needed for Moderate Pain ., Disp: 30 tablet, Rfl: 5    The following portions of the patient's history were reviewed and updated as appropriate: allergies, current medications, past family history, past medical history, past social history, past surgical history and problem list.    Review of Systems   Constitutional: Negative.    Respiratory: Positive for cough and wheezing.    Cardiovascular: Negative.    Gastrointestinal: Negative.    Musculoskeletal: Negative.    Skin: Negative.    Neurological: Negative.    Psychiatric/Behavioral: Negative.        Objective   Physical Exam   Constitutional: He is oriented to person, place, and time. He appears well-developed and well-nourished.   Neck: Neck supple.   Cardiovascular: Normal rate, regular rhythm and normal heart sounds.   Pulmonary/Chest: Effort normal. He has wheezes. He has rales.   Abdominal: Bowel sounds are normal.   Neurological: He is alert and oriented to person, place, and time.   Skin: Skin is warm.   Psychiatric: He has a normal mood and affect.       All tests have been reviewed.    Assessment/Plan   Diagnoses and all orders for this visit:    Paroxysmal atrial fibrillation cont present mx    Chronic diastolic congestive heart failure  (CMS/HCC) stable now    Coronary artery disease involving native coronary artery of native heart with angina pectoris (CMS/HCC)    SSS, s/p Cardiac pacemaker in situ follow cardio    Steroid-dependent COPD (CMS/HCC), cough worse, continue present mx add doxy tessalon, do CXR    Esophageal dysphagia GERD without esophagitis, UGI NSD,  follow up with GI    Type 2 diabetes mellitus with stage 3 chronic kidney disease, without long-term current use of insulin (CMS/HCC)    Lumbar radiculopathy stable     Chronic kidney disease, stage III (moderate) (CMS/HCC) watch     Anemia, unspecified type watch    3 weeks with others    To ER if breathing worse

## 2019-03-11 ENCOUNTER — TELEPHONE (OUTPATIENT)
Dept: GASTROENTEROLOGY | Facility: CLINIC | Age: 69
End: 2019-03-11

## 2019-03-11 NOTE — TELEPHONE ENCOUNTER
CALLED PATIENT REGARDING MISSED APPT ON 3/8/2019 3:00pm. NO ANSWER; LEFT VOICE MESSAGE. I WILL ALSO MAIL NO SHOW LETTER.

## 2019-03-20 RX ORDER — DOXYCYCLINE HYCLATE 100 MG/1
CAPSULE ORAL
Qty: 30 CAPSULE | Refills: 0 | OUTPATIENT
Start: 2019-03-20

## 2019-03-25 ENCOUNTER — TELEPHONE (OUTPATIENT)
Dept: INTERNAL MEDICINE | Facility: CLINIC | Age: 69
End: 2019-03-25

## 2019-03-25 RX ORDER — CHOLECALCIFEROL (VITAMIN D3) 125 MCG
CAPSULE ORAL
Qty: 30 TABLET | Refills: 0 | Status: SHIPPED | OUTPATIENT
Start: 2019-03-25 | End: 2019-04-02 | Stop reason: SDUPTHER

## 2019-03-25 RX ORDER — PANTOPRAZOLE SODIUM 40 MG/1
TABLET, DELAYED RELEASE ORAL
Qty: 30 TABLET | Refills: 0 | Status: SHIPPED | OUTPATIENT
Start: 2019-03-25 | End: 2019-04-22 | Stop reason: SDUPTHER

## 2019-03-25 RX ORDER — PREDNISONE 10 MG/1
TABLET ORAL
Qty: 30 TABLET | Refills: 0 | Status: SHIPPED | OUTPATIENT
Start: 2019-03-25 | End: 2019-04-22 | Stop reason: SDUPTHER

## 2019-03-25 RX ORDER — FINASTERIDE 5 MG/1
TABLET, FILM COATED ORAL
Qty: 30 TABLET | Refills: 0 | Status: SHIPPED | OUTPATIENT
Start: 2019-03-25 | End: 2019-04-22 | Stop reason: SDUPTHER

## 2019-03-25 NOTE — TELEPHONE ENCOUNTER
Monet with [facility] has been informed that Dr. Rojas would like for the patient to go to the ER.     I left a detailed voice mail advising patient to go and Monet is going to try to reach him as well

## 2019-03-27 ENCOUNTER — OFFICE VISIT (OUTPATIENT)
Dept: INTERNAL MEDICINE | Facility: CLINIC | Age: 69
End: 2019-03-27

## 2019-03-27 ENCOUNTER — HOSPITAL ENCOUNTER (OUTPATIENT)
Dept: GENERAL RADIOLOGY | Facility: HOSPITAL | Age: 69
Discharge: HOME | End: 2019-03-27
Admitting: INTERNAL MEDICINE

## 2019-03-27 VITALS
HEART RATE: 101 BPM | BODY MASS INDEX: 24.4 KG/M2 | RESPIRATION RATE: 16 BRPM | SYSTOLIC BLOOD PRESSURE: 170 MMHG | DIASTOLIC BLOOD PRESSURE: 85 MMHG | WEIGHT: 161 LBS | OXYGEN SATURATION: 95 % | TEMPERATURE: 97.7 F | HEIGHT: 68 IN

## 2019-03-27 DIAGNOSIS — J44.1 COPD EXACERBATION (HCC): Primary | ICD-10-CM

## 2019-03-27 LAB
ALBUMIN SERPL-MCNC: 4.1 G/DL (ref 3.5–5)
ALBUMIN/GLOB SERPL: 1.6 G/DL (ref 1–2)
ALP SERPL-CCNC: 91 U/L (ref 38–126)
ALT SERPL-CCNC: 37 U/L (ref 13–69)
AST SERPL-CCNC: 40 U/L (ref 15–46)
BASOPHILS # BLD AUTO: 0.04 10*3/MM3 (ref 0–0.2)
BASOPHILS NFR BLD AUTO: 0.6 % (ref 0–1.5)
BILIRUB SERPL-MCNC: 0.6 MG/DL (ref 0.2–1.3)
BUN SERPL-MCNC: 36 MG/DL (ref 7–20)
BUN/CREAT SERPL: 22.5 (ref 6.3–21.9)
CALCIUM SERPL-MCNC: 10.2 MG/DL (ref 8.4–10.2)
CHLORIDE SERPL-SCNC: 96 MMOL/L (ref 98–107)
CO2 SERPL-SCNC: 39 MMOL/L (ref 26–30)
CREAT SERPL-MCNC: 1.6 MG/DL (ref 0.6–1.3)
EOSINOPHIL # BLD AUTO: 0.17 10*3/MM3 (ref 0–0.4)
EOSINOPHIL NFR BLD AUTO: 2.4 % (ref 0.3–6.2)
ERYTHROCYTE [DISTWIDTH] IN BLOOD BY AUTOMATED COUNT: 12.6 % (ref 12.3–15.4)
GLOBULIN SER CALC-MCNC: 2.5 GM/DL
GLUCOSE SERPL-MCNC: 77 MG/DL (ref 74–98)
HCT VFR BLD AUTO: 35.1 % (ref 37.5–51)
HGB BLD-MCNC: 11 G/DL (ref 13–17.7)
IMM GRANULOCYTES # BLD AUTO: 0.02 10*3/MM3 (ref 0–0.05)
IMM GRANULOCYTES NFR BLD AUTO: 0.3 % (ref 0–0.5)
LYMPHOCYTES # BLD AUTO: 1.16 10*3/MM3 (ref 0.7–3.1)
LYMPHOCYTES NFR BLD AUTO: 16.3 % (ref 19.6–45.3)
MCH RBC QN AUTO: 29.6 PG (ref 26.6–33)
MCHC RBC AUTO-ENTMCNC: 31.3 G/DL (ref 31.5–35.7)
MCV RBC AUTO: 94.6 FL (ref 79–97)
MONOCYTES # BLD AUTO: 0.7 10*3/MM3 (ref 0.1–0.9)
MONOCYTES NFR BLD AUTO: 9.8 % (ref 5–12)
NEUTROPHILS # BLD AUTO: 5.04 10*3/MM3 (ref 1.4–7)
NEUTROPHILS NFR BLD AUTO: 70.6 % (ref 42.7–76)
NRBC BLD AUTO-RTO: 0 /100 WBC (ref 0–0)
PLATELET # BLD AUTO: 249 10*3/MM3 (ref 140–450)
POTASSIUM SERPL-SCNC: 3.9 MMOL/L (ref 3.5–5.1)
PROT SERPL-MCNC: 6.6 G/DL (ref 6.3–8.2)
RBC # BLD AUTO: 3.71 10*6/MM3 (ref 4.14–5.8)
SODIUM SERPL-SCNC: 141 MMOL/L (ref 137–145)
WBC # BLD AUTO: 7.13 10*3/MM3 (ref 3.4–10.8)

## 2019-03-27 PROCEDURE — 71046 X-RAY EXAM CHEST 2 VIEWS: CPT

## 2019-03-27 PROCEDURE — 99214 OFFICE O/P EST MOD 30 MIN: CPT | Performed by: INTERNAL MEDICINE

## 2019-03-27 RX ORDER — BENZONATATE 200 MG/1
200 CAPSULE ORAL 3 TIMES DAILY PRN
Qty: 50 CAPSULE | Refills: 0 | Status: SHIPPED | OUTPATIENT
Start: 2019-03-27 | End: 2019-06-05

## 2019-03-27 RX ORDER — LEVOFLOXACIN 750 MG/1
750 TABLET ORAL DAILY
Qty: 7 TABLET | Refills: 0 | Status: SHIPPED | OUTPATIENT
Start: 2019-03-27 | End: 2019-06-05

## 2019-03-27 RX ORDER — PREDNISONE 20 MG/1
TABLET ORAL
Qty: 15 TABLET | Refills: 0 | Status: SHIPPED | OUTPATIENT
Start: 2019-03-27 | End: 2019-06-05

## 2019-03-27 RX ORDER — CLONIDINE HYDROCHLORIDE 0.1 MG/1
0.1 TABLET ORAL 2 TIMES DAILY
Qty: 20 TABLET | Refills: 0 | Status: SHIPPED | OUTPATIENT
Start: 2019-03-27 | End: 2019-08-31 | Stop reason: HOSPADM

## 2019-03-27 NOTE — PROGRESS NOTES
Subjective   Jani Pena is a 68 y.o. male.     Chief Complaint   Patient presents with   • Abstract     coughpatient states its dry and has mucus sometimes        History of Present Illness   Cough  Patient complains of dyspnea, productive cough and wheezing. Symptoms began 3 days ago. Symptoms have been gradually worsening since that time.The cough is productive and is aggravated by reclining position. Associated symptoms include: chills and fever. Patient does not have new pets. Patient does not have a history of asthma. Patient does not have a history of environmental allergens. Patient has not traveled recently. Patient does have a history of smoking. Patient has had a previous chest x-ray. Patient has had a PPD done.+hemoptysis      Current Outpatient Medications:   •  acetaminophen (TYLENOL) 325 MG tablet, Take 2 tablets by mouth 4 (Four) Times a Day As Needed for Mild Pain , Headache or Fever., Disp: , Rfl:   •  albuterol (PROVENTIL) (2.5 MG/3ML) 0.083% nebulizer solution, Take 2.5 mg by nebulization Every 6 (Six) Hours., Disp: , Rfl: 12  •  alfuzosin (UROXATRAL) 10 MG 24 hr tablet, TAKE ONE TABLET BY MOUTH DAILY, Disp: 30 tablet, Rfl: 1  •  ALPRAZolam (XANAX) 0.5 MG tablet, Take 1 tablet by mouth Every Night., Disp: 30 tablet, Rfl: 0  •  aspirin 81 MG EC tablet, Take 1 tablet by mouth Daily., Disp: 90 tablet, Rfl: 3  •  atorvastatin (LIPITOR) 10 MG tablet, Take 1 tablet by mouth Every Night., Disp: , Rfl:   •  baclofen (LIORESAL) 10 MG tablet, TAKE ONE TABLET BY MOUTH THREE TIMES A DAY, Disp: 90 tablet, Rfl: 0  •  BREO ELLIPTA 200-25 MCG/INH aerosol powder , Inhale 1 each Daily., Disp: 60 each, Rfl: 5  •  budesonide (PULMICORT) 0.5 MG/2ML nebulizer solution, Take 2 mL by nebulization 2 (Two) Times a Day., Disp: , Rfl:   •  calcium carbonate (OS-HAFSA) 600 MG tablet, Take 600 mg by mouth 2 (Two) Times a Day With Meals., Disp: , Rfl:   •  clopidogrel (PLAVIX) 75 MG tablet, TAKE ONE TABLET BY MOUTH DAILY,  Disp: 30 tablet, Rfl: 4  •  docusate sodium 100 MG capsule, Take 100 mg by mouth 2 (Two) Times a Day As Needed for Constipation., Disp: , Rfl:   •  doxycycline (VIBRAMYCIN) 100 MG capsule, Take 1 capsule by mouth 2 (Two) Times a Day., Disp: 30 capsule, Rfl: 0  •  escitalopram (LEXAPRO) 10 MG tablet, Take 1 tablet by mouth Daily., Disp: 90 tablet, Rfl: 3  •  finasteride (PROSCAR) 5 MG tablet, TAKE ONE TABLET BY MOUTH DAILY, Disp: 30 tablet, Rfl: 0  •  fluticasone (FLONASE) 50 MCG/ACT nasal spray, 1 spray into each nostril Daily. (Patient taking differently: 1 spray into the nostril(s) as directed by provider Daily As Needed.), Disp: 16 g, Rfl: 5  •  furosemide (LASIX) 20 MG tablet, TAKE ONE TABLET BY MOUTH DAILY, Disp: 30 tablet, Rfl: 0  •  ipratropium-albuterol (DUO-NEB) 0.5-2.5 mg/mL nebulizer, Take 3 mL by nebulization Every 6 (Six) Hours., Disp: 360 mL, Rfl:   •  iron polysaccharides (NIFEREX) 150 MG capsule, Take 150 mg by mouth 2 (Two) Times a Day., Disp: , Rfl:   •  melatonin 5 MG sublingual tablet sublingual tablet, Place 1 tablet under the tongue Every Night., Disp: , Rfl:   •  melatonin 5 MG tablet tablet, TAKE ONE TABLET BY MOUTH AT BEDTIME, Disp: 30 tablet, Rfl: 0  •  metoprolol tartrate (LOPRESSOR) 25 MG tablet, Take 1 tablet by mouth Every 12 (Twelve) Hours., Disp: 180 tablet, Rfl: 3  •  nitroglycerin (NITROSTAT) 0.4 MG SL tablet, Place 0.4 mg under the tongue Every 5 (Five) Minutes As Needed., Disp: , Rfl:   •  O2 (OXYGEN), Inhale 4 L/min 1 (One) Time. Walking on 4L and sitting 3L, Disp: , Rfl:   •  ondansetron (ZOFRAN) 4 MG tablet, Take 1 tablet by mouth Every 6 (Six) Hours As Needed for Nausea or Vomiting., Disp: , Rfl:   •  oxybutynin (DITROPAN) 5 MG tablet, TAKE ONE TABLET BY MOUTH EVERY NIGHT AT BEDTIME, Disp: 30 tablet, Rfl: 3  •  pantoprazole (PROTONIX) 40 MG EC tablet, TAKE ONE TABLET BY MOUTH DAILY, Disp: 30 tablet, Rfl: 0  •  polyethylene glycol (MIRALAX) pack packet, Take 17 g by mouth  Daily., Disp: , Rfl:   •  predniSONE (DELTASONE) 10 MG tablet, TAKE ONE TABLET BY MOUTH DAILY, Disp: 30 tablet, Rfl: 0  •  PROAIR  (90 BASE) MCG/ACT inhaler, Inhale 2 puffs Every 4 (Four) Hours As Needed for Wheezing or Shortness of Air., Disp: , Rfl:   •  spironolactone (ALDACTONE) 25 MG tablet, TAKE ONE TABLET BY MOUTH DAILY, Disp: 30 tablet, Rfl: 2  •  traMADol (ULTRAM) 50 MG tablet, Take 1 tablet by mouth Every 8 (Eight) Hours As Needed for Moderate Pain ., Disp: 30 tablet, Rfl: 5  •  benzonatate (TESSALON) 200 MG capsule, Take 1 capsule by mouth 3 (Three) Times a Day As Needed for Cough., Disp: 50 capsule, Rfl: 0  •  levoFLOXacin (LEVAQUIN) 750 MG tablet, Take 1 tablet by mouth Daily., Disp: 7 tablet, Rfl: 0  •  predniSONE (DELTASONE) 20 MG tablet, 3 tab daily for 5 days, Disp: 15 tablet, Rfl: 0    The following portions of the patient's history were reviewed and updated as appropriate: allergies, current medications, past family history, past medical history, past social history, past surgical history and problem list.    Review of Systems   Constitutional: Negative.    Respiratory: Positive for cough, shortness of breath and wheezing.    Cardiovascular: Negative.    Gastrointestinal: Negative.    Musculoskeletal: Negative.    Skin: Negative.    Neurological: Negative.    Psychiatric/Behavioral: Negative.        Objective   Physical Exam   Constitutional: He is oriented to person, place, and time. He appears well-developed and well-nourished.   Neck: Neck supple.   Cardiovascular: Normal rate, regular rhythm and normal heart sounds.   Pulmonary/Chest: He is in respiratory distress. He has wheezes. He has rales.   Abdominal: Bowel sounds are normal.   Neurological: He is alert and oriented to person, place, and time.   Skin: Skin is warm.   Psychiatric: He has a normal mood and affect.       All tests have been reviewed.    Assessment/Plan   Diagnoses and all orders for this visit:    COPD exacerbation  (CMS/HCC)  -     XR Chest PA & Lateral  -     CBC & Differential  -     Comprehensive Metabolic Panel    Other orders  -     levoFLOXacin (LEVAQUIN) 750 MG tablet; Take 1 tablet by mouth Daily.  -     benzonatate (TESSALON) 200 MG capsule; Take 1 capsule by mouth 3 (Three) Times a Day As Needed for Cough.  -     predniSONE (DELTASONE) 20 MG tablet; 3 tab daily for 5 days      BP high , start clonidine prn for SBP above 160    10 days    Counseling given: Not Answered   ER if worse

## 2019-04-02 ENCOUNTER — OFFICE VISIT (OUTPATIENT)
Dept: INTERNAL MEDICINE | Facility: CLINIC | Age: 69
End: 2019-04-02

## 2019-04-02 VITALS
DIASTOLIC BLOOD PRESSURE: 72 MMHG | HEART RATE: 92 BPM | SYSTOLIC BLOOD PRESSURE: 122 MMHG | WEIGHT: 165.8 LBS | TEMPERATURE: 98 F | HEIGHT: 68 IN | OXYGEN SATURATION: 94 % | BODY MASS INDEX: 25.13 KG/M2

## 2019-04-02 DIAGNOSIS — N18.30 CHRONIC KIDNEY DISEASE, STAGE III (MODERATE) (HCC): ICD-10-CM

## 2019-04-02 DIAGNOSIS — Z87.74 HISTORY OF VENTRICULAR SEPTAL DEFECT: ICD-10-CM

## 2019-04-02 DIAGNOSIS — R13.19 ESOPHAGEAL DYSPHAGIA: ICD-10-CM

## 2019-04-02 DIAGNOSIS — D64.9 ANEMIA, UNSPECIFIED TYPE: ICD-10-CM

## 2019-04-02 DIAGNOSIS — E78.2 MIXED HYPERLIPIDEMIA: ICD-10-CM

## 2019-04-02 DIAGNOSIS — M54.16 LUMBAR RADICULOPATHY: ICD-10-CM

## 2019-04-02 DIAGNOSIS — G47.30 SLEEP APNEA, UNSPECIFIED TYPE: ICD-10-CM

## 2019-04-02 DIAGNOSIS — Z78.9 IMPAIRED MOBILITY AND ADLS: ICD-10-CM

## 2019-04-02 DIAGNOSIS — K21.9 GERD WITHOUT ESOPHAGITIS: ICD-10-CM

## 2019-04-02 DIAGNOSIS — I50.32 CHRONIC DIASTOLIC CONGESTIVE HEART FAILURE (HCC): ICD-10-CM

## 2019-04-02 DIAGNOSIS — K57.90 DIVERTICULOSIS OF INTESTINE WITHOUT BLEEDING, UNSPECIFIED INTESTINAL TRACT LOCATION: ICD-10-CM

## 2019-04-02 DIAGNOSIS — I48.0 PAROXYSMAL ATRIAL FIBRILLATION (HCC): Primary | ICD-10-CM

## 2019-04-02 DIAGNOSIS — I25.119 CORONARY ARTERY DISEASE INVOLVING NATIVE CORONARY ARTERY OF NATIVE HEART WITH ANGINA PECTORIS (HCC): ICD-10-CM

## 2019-04-02 DIAGNOSIS — Z95.0 CARDIAC PACEMAKER IN SITU: ICD-10-CM

## 2019-04-02 DIAGNOSIS — Z74.09 IMPAIRED MOBILITY AND ADLS: ICD-10-CM

## 2019-04-02 DIAGNOSIS — N40.0 ENLARGED PROSTATE WITHOUT LOWER URINARY TRACT SYMPTOMS (LUTS): ICD-10-CM

## 2019-04-02 DIAGNOSIS — N18.30 TYPE 2 DIABETES MELLITUS WITH STAGE 3 CHRONIC KIDNEY DISEASE, WITHOUT LONG-TERM CURRENT USE OF INSULIN (HCC): ICD-10-CM

## 2019-04-02 DIAGNOSIS — M50.30 DEGENERATION OF CERVICAL INTERVERTEBRAL DISC: ICD-10-CM

## 2019-04-02 DIAGNOSIS — M54.5 LOW BACK PAIN, UNSPECIFIED BACK PAIN LATERALITY, UNSPECIFIED CHRONICITY, WITH SCIATICA PRESENCE UNSPECIFIED: ICD-10-CM

## 2019-04-02 DIAGNOSIS — J44.9 STEROID-DEPENDENT COPD (HCC): ICD-10-CM

## 2019-04-02 DIAGNOSIS — E11.22 TYPE 2 DIABETES MELLITUS WITH STAGE 3 CHRONIC KIDNEY DISEASE, WITHOUT LONG-TERM CURRENT USE OF INSULIN (HCC): ICD-10-CM

## 2019-04-02 DIAGNOSIS — Z87.39 HISTORY OF ARTHRITIS: ICD-10-CM

## 2019-04-02 DIAGNOSIS — I49.5 SICK SINUS SYNDROME (HCC): ICD-10-CM

## 2019-04-02 DIAGNOSIS — F41.9 ANXIETY: ICD-10-CM

## 2019-04-02 DIAGNOSIS — I10 ESSENTIAL HYPERTENSION: ICD-10-CM

## 2019-04-02 DIAGNOSIS — F32.A DEPRESSION, UNSPECIFIED DEPRESSION TYPE: ICD-10-CM

## 2019-04-02 PROCEDURE — 99214 OFFICE O/P EST MOD 30 MIN: CPT | Performed by: INTERNAL MEDICINE

## 2019-04-02 RX ORDER — ESCITALOPRAM OXALATE 20 MG/1
20 TABLET ORAL DAILY
Qty: 30 TABLET | Refills: 6 | Status: SHIPPED | OUTPATIENT
Start: 2019-04-02 | End: 2020-01-08 | Stop reason: SDUPTHER

## 2019-04-02 NOTE — PROGRESS NOTES
Subjective   Jani Pena is a 68 y.o. male.     Chief Complaint   Patient presents with   • Follow-up       History of Present Illness   Patient here for follow-up of.  COPD exacerbation improved after medication.  Patient states exacerbation due to anxiety stress at home.  Patient is taking Lexapro 10 mg daily and Xanax as needed.  Heart disease is stable now.  Patient states he still has some swallow problem patient yet to contact GI doctor.  Anemia stable chronic kidney disease patient is seeing her nephrologist is stable now.  The diabetes is stable.  Hypertension stable on medication.    Current Outpatient Medications:   •  acetaminophen (TYLENOL) 325 MG tablet, Take 2 tablets by mouth 4 (Four) Times a Day As Needed for Mild Pain , Headache or Fever., Disp: , Rfl:   •  albuterol (PROVENTIL) (2.5 MG/3ML) 0.083% nebulizer solution, Take 2.5 mg by nebulization Every 6 (Six) Hours., Disp: , Rfl: 12  •  alfuzosin (UROXATRAL) 10 MG 24 hr tablet, TAKE ONE TABLET BY MOUTH DAILY, Disp: 30 tablet, Rfl: 1  •  ALPRAZolam (XANAX) 0.5 MG tablet, Take 1 tablet by mouth Every Night., Disp: 30 tablet, Rfl: 0  •  aspirin 81 MG EC tablet, Take 1 tablet by mouth Daily., Disp: 90 tablet, Rfl: 3  •  atorvastatin (LIPITOR) 10 MG tablet, Take 1 tablet by mouth Every Night., Disp: , Rfl:   •  baclofen (LIORESAL) 10 MG tablet, TAKE ONE TABLET BY MOUTH THREE TIMES A DAY, Disp: 90 tablet, Rfl: 0  •  BREO ELLIPTA 200-25 MCG/INH aerosol powder , Inhale 1 each Daily., Disp: 60 each, Rfl: 5  •  budesonide (PULMICORT) 0.5 MG/2ML nebulizer solution, Take 2 mL by nebulization 2 (Two) Times a Day., Disp: , Rfl:   •  calcium carbonate (OS-HAFSA) 600 MG tablet, Take 600 mg by mouth 2 (Two) Times a Day With Meals., Disp: , Rfl:   •  CloNIDine (CATAPRES) 0.1 MG tablet, Take 1 tablet by mouth 2 (Two) Times a Day. When BP higher than 160, Disp: 20 tablet, Rfl: 0  •  clopidogrel (PLAVIX) 75 MG tablet, TAKE ONE TABLET BY MOUTH DAILY, Disp: 30 tablet,  Rfl: 4  •  docusate sodium 100 MG capsule, Take 100 mg by mouth 2 (Two) Times a Day As Needed for Constipation., Disp: , Rfl:   •  escitalopram (LEXAPRO) 10 MG tablet, Take 1 tablet by mouth Daily., Disp: 90 tablet, Rfl: 3  •  finasteride (PROSCAR) 5 MG tablet, TAKE ONE TABLET BY MOUTH DAILY, Disp: 30 tablet, Rfl: 0  •  fluticasone (FLONASE) 50 MCG/ACT nasal spray, 1 spray into each nostril Daily. (Patient taking differently: 1 spray into the nostril(s) as directed by provider Daily As Needed.), Disp: 16 g, Rfl: 5  •  furosemide (LASIX) 20 MG tablet, TAKE ONE TABLET BY MOUTH DAILY, Disp: 30 tablet, Rfl: 0  •  ipratropium-albuterol (DUO-NEB) 0.5-2.5 mg/mL nebulizer, Take 3 mL by nebulization Every 6 (Six) Hours., Disp: 360 mL, Rfl:   •  iron polysaccharides (NIFEREX) 150 MG capsule, Take 150 mg by mouth 2 (Two) Times a Day., Disp: , Rfl:   •  levoFLOXacin (LEVAQUIN) 750 MG tablet, Take 1 tablet by mouth Daily., Disp: 7 tablet, Rfl: 0  •  melatonin 5 MG sublingual tablet sublingual tablet, Place 1 tablet under the tongue Every Night., Disp: , Rfl:   •  metoprolol tartrate (LOPRESSOR) 25 MG tablet, Take 1 tablet by mouth Every 12 (Twelve) Hours., Disp: 180 tablet, Rfl: 3  •  nitroglycerin (NITROSTAT) 0.4 MG SL tablet, Place 0.4 mg under the tongue Every 5 (Five) Minutes As Needed., Disp: , Rfl:   •  O2 (OXYGEN), Inhale 4 L/min 1 (One) Time. Walking on 4L and sitting 3L, Disp: , Rfl:   •  ondansetron (ZOFRAN) 4 MG tablet, Take 1 tablet by mouth Every 6 (Six) Hours As Needed for Nausea or Vomiting., Disp: , Rfl:   •  oxybutynin (DITROPAN) 5 MG tablet, TAKE ONE TABLET BY MOUTH EVERY NIGHT AT BEDTIME, Disp: 30 tablet, Rfl: 3  •  pantoprazole (PROTONIX) 40 MG EC tablet, TAKE ONE TABLET BY MOUTH DAILY, Disp: 30 tablet, Rfl: 0  •  polyethylene glycol (MIRALAX) pack packet, Take 17 g by mouth Daily., Disp: , Rfl:   •  predniSONE (DELTASONE) 10 MG tablet, TAKE ONE TABLET BY MOUTH DAILY, Disp: 30 tablet, Rfl: 0  •  predniSONE  (DELTASONE) 20 MG tablet, 3 tab daily for 5 days, Disp: 15 tablet, Rfl: 0  •  PROAIR  (90 BASE) MCG/ACT inhaler, Inhale 2 puffs Every 4 (Four) Hours As Needed for Wheezing or Shortness of Air., Disp: , Rfl:   •  spironolactone (ALDACTONE) 25 MG tablet, TAKE ONE TABLET BY MOUTH DAILY, Disp: 30 tablet, Rfl: 2  •  traMADol (ULTRAM) 50 MG tablet, Take 1 tablet by mouth Every 8 (Eight) Hours As Needed for Moderate Pain ., Disp: 30 tablet, Rfl: 5  •  benzonatate (TESSALON) 200 MG capsule, Take 1 capsule by mouth 3 (Three) Times a Day As Needed for Cough., Disp: 50 capsule, Rfl: 0    The following portions of the patient's history were reviewed and updated as appropriate: allergies, current medications, past family history, past medical history, past social history, past surgical history and problem list.    Review of Systems   Constitutional: Negative.    Respiratory: Positive for shortness of breath and wheezing.    Cardiovascular: Negative.    Gastrointestinal: Negative.    Musculoskeletal: Negative.    Skin: Negative.    Neurological: Negative.    Psychiatric/Behavioral: Negative.        Objective   Physical Exam   Constitutional: He is oriented to person, place, and time. He appears well-nourished.   Neck: Neck supple.   Cardiovascular: Normal rate, regular rhythm and normal heart sounds.   Pulmonary/Chest: Effort normal and breath sounds normal.   Coarse breath sound   Abdominal: Bowel sounds are normal.   Neurological: He is alert and oriented to person, place, and time.   Skin: Skin is warm.   Psychiatric: He has a normal mood and affect.       All tests have been reviewed.    Assessment/Plan   There are no diagnoses linked to this encounter.               Chronic diastolic congestive heart failure (CMS/HCC) stable now  Coronary artery disease cath negative  SSS, s/p Cardiac pacemaker in situ follow cardio  Steroid-dependent COPD (CMS/HCC),  continue present mx recent exacerbation, improved after medication.    Esophageal dysphagia, GERD without esophagitis, UGI NSD,  follow up with GI, pending EGD with dilation  Lumbar radiculopathy stable   Chronic kidney disease, stage III (moderate) (CMS/HCC) watch   Left neck pain continue baclofen tylenol and PT  osteopenia, oscal D 500mg bid continue  left low back pain resolved follow up with ortho s/p left hip surgery for severe OA doing well.   alkaline phosphatase still high watch---  divertic hemorroid on colon 1/2013  DM stable without med  phimosis seen by uro declined surgery  Hematuria followup with urologist s/p cystoscope bladder stones  Hypertension continue med  BPH enlarged on CT scan follow up with urologist.  Anemia workup all negative.? anemia of chronic disease due to chronic disease   History of the CAD atrial fibrillation VSD status post occlusion stable now. Status post a defibrillator seen by cardio follow up with cardio, occa chest pain follow cardio  History of a TIA patient is on Plavix continue  pneumovax done, prevnar 13 1/19/17, Td to HD.flushot done, shingrix informed  Depression anxiety increase lexapro to 20 daily and cont xanax prn  Decline to disrobe for PE  right inquinal hernia decline surgery now, call if worse  BLE edema mild, watch for now, cut down salt, avoid soda     4week and due for  wellnexx

## 2019-04-15 RX ORDER — ALPRAZOLAM 0.5 MG/1
TABLET ORAL
Qty: 30 TABLET | Refills: 1 | Status: SHIPPED | OUTPATIENT
Start: 2019-04-15 | End: 2019-06-05 | Stop reason: SDUPTHER

## 2019-04-16 RX ORDER — CHOLECALCIFEROL (VITAMIN D3) 125 MCG
CAPSULE ORAL
Qty: 30 TABLET | Refills: 0 | Status: SHIPPED | OUTPATIENT
Start: 2019-04-16 | End: 2019-05-06 | Stop reason: SDUPTHER

## 2019-04-23 RX ORDER — PREDNISONE 10 MG/1
TABLET ORAL
Qty: 30 TABLET | Refills: 0 | Status: SHIPPED | OUTPATIENT
Start: 2019-04-23 | End: 2019-05-24 | Stop reason: SDUPTHER

## 2019-04-23 RX ORDER — PANTOPRAZOLE SODIUM 40 MG/1
TABLET, DELAYED RELEASE ORAL
Qty: 30 TABLET | Refills: 0 | Status: SHIPPED | OUTPATIENT
Start: 2019-04-23 | End: 2019-05-20 | Stop reason: SDUPTHER

## 2019-04-23 RX ORDER — FINASTERIDE 5 MG/1
TABLET, FILM COATED ORAL
Qty: 30 TABLET | Refills: 0 | Status: SHIPPED | OUTPATIENT
Start: 2019-04-23 | End: 2019-05-20 | Stop reason: SDUPTHER

## 2019-04-23 RX ORDER — CLOPIDOGREL BISULFATE 75 MG/1
TABLET ORAL
Qty: 30 TABLET | Refills: 0 | Status: SHIPPED | OUTPATIENT
Start: 2019-04-23 | End: 2019-05-20 | Stop reason: SDUPTHER

## 2019-04-30 RX ORDER — BACLOFEN 10 MG/1
TABLET ORAL
Qty: 90 TABLET | Refills: 0 | Status: SHIPPED | OUTPATIENT
Start: 2019-04-30 | End: 2019-05-31 | Stop reason: SDUPTHER

## 2019-04-30 RX ORDER — ATORVASTATIN CALCIUM 40 MG/1
TABLET, FILM COATED ORAL
Qty: 90 TABLET | Refills: 3 | Status: SHIPPED | OUTPATIENT
Start: 2019-04-30 | End: 2019-06-05

## 2019-05-06 RX ORDER — CHOLECALCIFEROL (VITAMIN D3) 125 MCG
CAPSULE ORAL
Qty: 30 TABLET | Refills: 0 | Status: SHIPPED | OUTPATIENT
Start: 2019-05-06 | End: 2019-05-24 | Stop reason: SDUPTHER

## 2019-05-20 DIAGNOSIS — N32.81 OAB (OVERACTIVE BLADDER): ICD-10-CM

## 2019-05-21 RX ORDER — CLOPIDOGREL BISULFATE 75 MG/1
TABLET ORAL
Qty: 30 TABLET | Refills: 0 | Status: SHIPPED | OUTPATIENT
Start: 2019-05-21 | End: 2019-06-17 | Stop reason: SDUPTHER

## 2019-05-21 RX ORDER — PANTOPRAZOLE SODIUM 40 MG/1
TABLET, DELAYED RELEASE ORAL
Qty: 30 TABLET | Refills: 0 | Status: SHIPPED | OUTPATIENT
Start: 2019-05-21 | End: 2019-06-24 | Stop reason: SDUPTHER

## 2019-05-21 RX ORDER — FINASTERIDE 5 MG/1
TABLET, FILM COATED ORAL
Qty: 30 TABLET | Refills: 0 | Status: SHIPPED | OUTPATIENT
Start: 2019-05-21 | End: 2019-06-24 | Stop reason: SDUPTHER

## 2019-05-21 RX ORDER — OXYBUTYNIN CHLORIDE 5 MG/1
TABLET ORAL
Qty: 30 TABLET | Refills: 0 | Status: SHIPPED | OUTPATIENT
Start: 2019-05-21 | End: 2019-06-17 | Stop reason: SDUPTHER

## 2019-05-24 RX ORDER — CHOLECALCIFEROL (VITAMIN D3) 125 MCG
CAPSULE ORAL
Qty: 30 TABLET | Refills: 0 | Status: SHIPPED | OUTPATIENT
Start: 2019-05-24 | End: 2019-06-05 | Stop reason: SDUPTHER

## 2019-05-24 RX ORDER — ALFUZOSIN HYDROCHLORIDE 10 MG/1
TABLET, EXTENDED RELEASE ORAL
Qty: 30 TABLET | Refills: 0 | Status: SHIPPED | OUTPATIENT
Start: 2019-05-24 | End: 2019-06-24 | Stop reason: SDUPTHER

## 2019-05-24 RX ORDER — PREDNISONE 10 MG/1
TABLET ORAL
Qty: 30 TABLET | Refills: 0 | Status: SHIPPED | OUTPATIENT
Start: 2019-05-24 | End: 2019-06-24 | Stop reason: SDUPTHER

## 2019-06-01 RX ORDER — BACLOFEN 10 MG/1
TABLET ORAL
Qty: 90 TABLET | Refills: 0 | Status: SHIPPED | OUTPATIENT
Start: 2019-06-01 | End: 2019-07-01 | Stop reason: SDUPTHER

## 2019-06-05 ENCOUNTER — OFFICE VISIT (OUTPATIENT)
Dept: INTERNAL MEDICINE | Facility: CLINIC | Age: 69
End: 2019-06-05

## 2019-06-05 VITALS
RESPIRATION RATE: 22 BRPM | HEIGHT: 68 IN | SYSTOLIC BLOOD PRESSURE: 115 MMHG | WEIGHT: 165 LBS | OXYGEN SATURATION: 91 % | HEART RATE: 96 BPM | DIASTOLIC BLOOD PRESSURE: 96 MMHG | BODY MASS INDEX: 25.01 KG/M2 | TEMPERATURE: 97.5 F

## 2019-06-05 DIAGNOSIS — R31.9 HEMATURIA, UNSPECIFIED TYPE: ICD-10-CM

## 2019-06-05 DIAGNOSIS — R31.9 URINARY TRACT INFECTION WITH HEMATURIA, SITE UNSPECIFIED: Primary | ICD-10-CM

## 2019-06-05 DIAGNOSIS — N39.0 URINARY TRACT INFECTION WITH HEMATURIA, SITE UNSPECIFIED: Primary | ICD-10-CM

## 2019-06-05 LAB
BILIRUB BLD-MCNC: ABNORMAL MG/DL
CLARITY, POC: ABNORMAL
COLOR UR: ABNORMAL
GLUCOSE UR STRIP-MCNC: ABNORMAL MG/DL
KETONES UR QL: NEGATIVE
LEUKOCYTE EST, POC: ABNORMAL
NITRITE UR-MCNC: POSITIVE MG/ML
PH UR: 5 [PH] (ref 5–8)
PROT UR STRIP-MCNC: NEGATIVE MG/DL
RBC # UR STRIP: ABNORMAL /UL
SP GR UR: 1 (ref 1–1.03)
UROBILINOGEN UR QL: ABNORMAL

## 2019-06-05 PROCEDURE — 81003 URINALYSIS AUTO W/O SCOPE: CPT | Performed by: PHYSICIAN ASSISTANT

## 2019-06-05 PROCEDURE — 99213 OFFICE O/P EST LOW 20 MIN: CPT | Performed by: PHYSICIAN ASSISTANT

## 2019-06-05 RX ORDER — CIPROFLOXACIN 500 MG/1
500 TABLET, FILM COATED ORAL 2 TIMES DAILY
Qty: 14 TABLET | Refills: 0 | Status: SHIPPED | OUTPATIENT
Start: 2019-06-05 | End: 2019-06-12

## 2019-06-05 RX ORDER — MIRTAZAPINE 15 MG/1
15 TABLET, FILM COATED ORAL DAILY
COMMUNITY
Start: 2019-05-13 | End: 2019-08-31 | Stop reason: HOSPADM

## 2019-06-05 NOTE — PROGRESS NOTES
Chief Complaint   Patient presents with   • Blood in Urine     patient states he noticed some blood in is urine yesterday, patient also states he is having some burning as well.        Subjective   Jani Pena is a 69 y.o. male    History of Present Illness    Patient reports that symptoms began acutely yesterday with large amounts of blood in his urine.  He denies any dysuria.  He reports that the blood began to taper off throughout the day.  He noted that he had to strain a bit while urinating.  He did have usual stream.  There was noted increased frequency.  He is followed by urology, Dr. Lowe, but he is out of town this week.  He denies any dizziness or abdominal pain.  No episodes of fever, nausea, or vomiting.  Has had some generalized weakness.     Past Medical History:   Diagnosis Date   • Abnormal liver enzymes    • Anemia    • Anxiety    • Arthritis    • Atherosclerotic heart disease of native coronary artery without angina pectoris    • Atrial fibrillation (CMS/HCC)    • Back pain    • BPH (benign prostatic hyperplasia)    • Cataract, bilateral    • Chest pain     2-3 MONTHS AGO.  PER PT, HAS ADDRESSED WITH CARDIOLOGIST.  STATES HAS NTG PRN.   • CHF (congestive heart failure) (CMS/HCC)    • Chronic bronchitis (CMS/HCC)    • Chronic kidney disease    • COPD (chronic obstructive pulmonary disease) (CMS/HCC)    • Degeneration of cervical intervertebral disc    • Depression    • Diverticulosis    • Dyspnea    • Esophageal reflux    • Esophagitis    • Gastritis    • Hematuria    • Hemorrhoids    • Hiatal hernia    • History of arthritis    • History of nuclear stress test     UNSURE OF DATE   • History of peripheral neuropathy    • History of ventricular septal defect    • History of ventricular septal defect    • Hyperlipidemia    • Hypertension    • Infectious diarrhea    • Kyphosis, acquired    • Lumbar radiculopathy    • Mitral and aortic valve disease    • Nephrolithiasis    • Phimosis    •  Problems with swallowing     WITH FOOD   • Respiratory failure (CMS/HCC)    • Sleep apnea     BIPAP   • TIA (transient ischemic attack)     X3.   2014   • Ventral hernia    • Wears glasses     FOR READING     Past Surgical History:   Procedure Laterality Date   • CARDIAC CATHETERIZATION     • CARDIAC CATHETERIZATION N/A 2018    Procedure: Left Heart Cath;  Surgeon: Edouard Robertson MD;  Location:  [facility];  Service: Cardiovascular   • CARDIAC ELECTROPHYSIOLOGY PROCEDURE N/A 2019    Procedure: PACEMAKER IMPLANTATION- DC;  Surgeon: Omar Encinas DO;  Location:  [facility];  Service: Cardiology   • ENDOSCOPY N/A 2019    Procedure: ESOPHAGOGASTRODUODENOSCOPY;  Surgeon: Brunner, Mark I, MD;  Location:  [facility];  Service: Gastroenterology   • HERNIA REPAIR     • ORTHOPEDIC SURGERY Left     Left hip arthroplasty   • PACEMAKER IMPLANTATION  2019   • SUPRAPUBIC CATHETER INSERTION      History of Percutaneous Catheter Placement Into Ureter   • THROAT SURGERY      FOR SLEEP APNEA   • VSD REPAIR      History of VSD Repair By Patch Closure     Family History   Problem Relation Age of Onset   • Other Father         CABG   • Coronary artery disease Father      Social History     Socioeconomic History   • Marital status:      Spouse name: Not on file   • Number of children: Not on file   • Years of education: Not on file   • Highest education level: Not on file   Occupational History     Employer: RETIRED   Tobacco Use   • Smoking status: Former Smoker     Packs/day: 3.00     Years: 30.00     Pack years: 90.00     Types: Cigarettes     Last attempt to quit: 2017     Years since quittin.4   • Smokeless tobacco: Never Used   Substance and Sexual Activity   • Alcohol use: No   • Drug use: No   • Sexual activity: Defer     Allergies   Allergen Reactions   • Mucomyst [Acetylcysteine] Other (See Comments)     bronchospasms   • Milk-Related Compounds Nausea And  "Vomiting   • Sulfa Antibiotics Nausea And Vomiting         Review of Systems   Constitutional: Positive for fatigue. Negative for activity change, appetite change, chills and fever.   Respiratory: Negative for shortness of breath.    Genitourinary: Positive for frequency and hematuria. Negative for decreased urine volume, difficulty urinating and dysuria.   Neurological: Positive for weakness (generalized. ). Negative for dizziness and light-headedness.   Psychiatric/Behavioral: Positive for sleep disturbance.     Objective       Vitals:    06/05/19 1118 06/05/19 1211   BP: 102/56 115/96   Pulse: 100 96   Resp: 22    Temp: 97.5 °F (36.4 °C)    SpO2: 91%    Weight: 74.8 kg (165 lb)    Height: 172.7 cm (68\")        Physical Exam   Constitutional: He is oriented to person, place, and time. He appears well-developed and well-nourished. No distress.   Chronically ill appearing male patient on continuous o2.    HENT:   Head: Normocephalic and atraumatic.   Eyes: Conjunctivae and EOM are normal. Pupils are equal, round, and reactive to light.   Neck: Normal range of motion. Neck supple. No JVD present.   Cardiovascular: Normal rate, regular rhythm and normal heart sounds. Exam reveals no gallop and no friction rub.   No murmur heard.  Pulmonary/Chest: Effort normal and breath sounds normal. No respiratory distress. He has no wheezes. He has no rales.   Abdominal: Soft. Bowel sounds are normal. He exhibits no distension. There is generalized tenderness. There is no rigidity, no rebound, no guarding and no CVA tenderness.   Musculoskeletal: Normal range of motion. He exhibits no edema.   Neurological: He is alert and oriented to person, place, and time.   Skin: Skin is warm and dry. Capillary refill takes less than 2 seconds. He is not diaphoretic.   Psychiatric: He has a normal mood and affect. His behavior is normal.   Nursing note and vitals reviewed.      Results for orders placed or performed in visit on 06/05/19   POCT " urinalysis dipstick, automated   Result Value Ref Range    Color Red (A) Yellow, Straw, Dark Yellow, Olya    Clarity, UA Cloudy (A) Clear    Specific Gravity  1.000 (A) 1.005 - 1.030    pH, Urine 5.0 5.0 - 8.0    Leukocytes Small (1+) (A) Negative    Nitrite, UA Positive (A) Negative    Protein, POC Negative Negative mg/dL    Glucose,  mg/dL (A) Negative, 1000 mg/dL (3+) mg/dL    Ketones, UA Negative Negative    Urobilinogen, UA 1 E.U./dL  (A) Normal    Bilirubin Moderate (2+) (A) Negative    Blood, UA 50 Jose Luis/ul (A) Negative       Assessment/Plan     Jani was seen today for blood in urine.    Diagnoses and all orders for this visit:    Urinary tract infection with hematuria, site unspecified  -     Urine Culture - Urine, Urine, Clean Catch  -     ciprofloxacin (CIPRO) 500 MG tablet; Take 1 tablet by mouth 2 (Two) Times a Day for 7 days.    Hematuria, unspecified type  -     POCT urinalysis dipstick, automated  -     CBC & Differential  -     Comprehensive Metabolic Panel    Patient contacted urology office and confirmed appointment for tomorrow.  He will be seen by Dr. Lawler tomorrow afternoon.  UA nitrite positive; will initiate Cipro and send urine off for culture.  Patient has tolerated fluoroquinolones well in the past.  Will obtain CBC and CMP today.  Encouraged patient to increase water intake.  Return to clinic if symptoms are worsening or fail to improve.  Patient verbalizes understanding is in agreement with plan.    Return if symptoms worsen or fail to improve.    Dacia Vanessa PA-C

## 2019-06-06 ENCOUNTER — OFFICE VISIT (OUTPATIENT)
Dept: UROLOGY | Facility: CLINIC | Age: 69
End: 2019-06-06

## 2019-06-06 VITALS
HEIGHT: 68 IN | DIASTOLIC BLOOD PRESSURE: 64 MMHG | WEIGHT: 164.9 LBS | BODY MASS INDEX: 24.99 KG/M2 | SYSTOLIC BLOOD PRESSURE: 115 MMHG | HEART RATE: 82 BPM | OXYGEN SATURATION: 96 %

## 2019-06-06 DIAGNOSIS — R31.9 HEMATURIA, UNSPECIFIED TYPE: Primary | ICD-10-CM

## 2019-06-06 LAB
APPEARANCE UR: (no result)
BACTERIA #/AREA URNS HPF: ABNORMAL /HPF
BILIRUB BLD-MCNC: NEGATIVE MG/DL
BILIRUB UR QL STRIP: NEGATIVE
CASTS URNS MICRO: ABNORMAL
CLARITY, POC: CLEAR
COLOR UR: YELLOW
COLOR UR: YELLOW
EPI CELLS #/AREA URNS HPF: ABNORMAL /HPF
GLUCOSE UR QL: NEGATIVE
GLUCOSE UR STRIP-MCNC: NEGATIVE MG/DL
HGB UR QL STRIP: (no result)
KETONES UR QL STRIP: NEGATIVE
KETONES UR QL: NEGATIVE
LEUKOCYTE EST, POC: ABNORMAL
LEUKOCYTE ESTERASE UR QL STRIP: (no result)
NITRITE UR QL STRIP: POSITIVE
NITRITE UR-MCNC: POSITIVE MG/ML
PH UR STRIP: 6 [PH] (ref 5–8)
PH UR: 6 [PH] (ref 5–8)
PROT UR QL STRIP: NEGATIVE
PROT UR STRIP-MCNC: NEGATIVE MG/DL
RBC # UR STRIP: ABNORMAL /UL
RBC #/AREA URNS HPF: ABNORMAL /HPF
SP GR UR: 1.01 (ref 1–1.03)
SP GR UR: 1.02 (ref 1–1.03)
UROBILINOGEN UR QL: NORMAL
UROBILINOGEN UR STRIP-MCNC: (no result) MG/DL
WBC #/AREA URNS HPF: ABNORMAL /HPF

## 2019-06-06 PROCEDURE — 99213 OFFICE O/P EST LOW 20 MIN: CPT | Performed by: UROLOGY

## 2019-06-06 PROCEDURE — 51798 US URINE CAPACITY MEASURE: CPT | Performed by: UROLOGY

## 2019-06-06 PROCEDURE — 81003 URINALYSIS AUTO W/O SCOPE: CPT | Performed by: UROLOGY

## 2019-06-06 NOTE — PROGRESS NOTES
Chief Complaint  LUTS  Gross hematuria  UTI    HPI  Mr. Pena is a 69 y.o. male with history of severe COPD, CHF, A. fib, BPH, nephrolithiasis who presents with recent episode of gross hematuria.  Primary symptom includes: GH, increased urgency and frequency  He was Dx w/ UTI and started on Cipro yesterday; 1st UTI this year.     He has battled BPH for many years he says. Has been on Rx for many years.   Nocturia 3-4  + weak stream  He is unhappy with his urinary Sx  Previous/current treatments include: Uroxatrol and finasteride, as well as oxybutynin.    yes Constipation  Yes history of kidney stones  He takes Plavix for his CAD    IPSS Questionnaire (AUA-7):  Over the past month…    1)  Incomplete Emptying  How often have you had a sensation of not emptying your bladder?  3 - About half the time   2)  Frequency  How often have you had to urinate less than every two hours? 4 - More than half the time   3)  Intermittency  How often have you found you stopped and started again several times when you urinated?  3 - About half the time   4) Urgency  How often have you found it difficult to postpone urination?  3 - About half the time   5) Weak Stream  How often have you had a weak urinary stream?  2 - Less than half the time   6) Straining  How often have you had to push or strain to begin urination?  3 - About half the time   7) Nocturia  How many times did you typically get up at night to urinate?  3 - 3 times   Total Score:  21       Quality of life due to urinary symptoms:  If you were to spend the rest of your life with your urinary condition the way it is now, how would you feel about that? 5-Unhappy   Urine Leakage (Incontinence) 0-No Leakage         Past Medical History  Past Medical History:   Diagnosis Date   • Abnormal liver enzymes    • Anemia    • Anxiety    • Arthritis    • Atherosclerotic heart disease of native coronary artery without angina pectoris    • Atrial fibrillation (CMS/HCC)    • Back pain     • BPH (benign prostatic hyperplasia)    • Cataract, bilateral    • Chest pain     2-3 MONTHS AGO.  PER PT, HAS ADDRESSED WITH CARDIOLOGIST.  STATES HAS NTG PRN.   • CHF (congestive heart failure) (CMS/HCC)    • Chronic bronchitis (CMS/HCC)    • Chronic kidney disease    • COPD (chronic obstructive pulmonary disease) (CMS/HCC)    • Degeneration of cervical intervertebral disc    • Depression    • Diverticulosis    • Dyspnea    • Esophageal reflux    • Esophagitis    • Gastritis    • Hematuria    • Hemorrhoids    • Hiatal hernia    • History of arthritis    • History of nuclear stress test     UNSURE OF DATE   • History of peripheral neuropathy    • History of ventricular septal defect    • History of ventricular septal defect    • Hyperlipidemia    • Hypertension    • Infectious diarrhea    • Kyphosis, acquired    • Lumbar radiculopathy    • Mitral and aortic valve disease    • Nephrolithiasis    • Phimosis    • Problems with swallowing     WITH FOOD   • Respiratory failure (CMS/HCC)    • Sleep apnea     BIPAP   • TIA (transient ischemic attack)     X3.   2014   • Ventral hernia    • Wears glasses     FOR READING       Past Surgical History  Past Surgical History:   Procedure Laterality Date   • CARDIAC CATHETERIZATION     • CARDIAC CATHETERIZATION N/A 5/24/2018    Procedure: Left Heart Cath;  Surgeon: Edourad Robertson MD;  Location:  [facility];  Service: Cardiovascular   • CARDIAC ELECTROPHYSIOLOGY PROCEDURE N/A 1/31/2019    Procedure: PACEMAKER IMPLANTATION- DC;  Surgeon: Omar Encinas DO;  Location:  [facility];  Service: Cardiology   • ENDOSCOPY N/A 1/26/2019    Procedure: ESOPHAGOGASTRODUODENOSCOPY;  Surgeon: Brunner, Mark I, MD;  Location:  [facility];  Service: Gastroenterology   • HERNIA REPAIR     • ORTHOPEDIC SURGERY Left     Left hip arthroplasty   • PACEMAKER IMPLANTATION  01/31/2019   • SUPRAPUBIC CATHETER INSERTION      History of Percutaneous Catheter Placement  Into Ureter   • THROAT SURGERY      FOR SLEEP APNEA   • VSD REPAIR      History of VSD Repair By Patch Closure       Medications    Current Outpatient Medications:   •  acetaminophen (TYLENOL) 325 MG tablet, Take 2 tablets by mouth 4 (Four) Times a Day As Needed for Mild Pain , Headache or Fever., Disp: , Rfl:   •  albuterol (PROVENTIL) (2.5 MG/3ML) 0.083% nebulizer solution, Take 2.5 mg by nebulization Every 6 (Six) Hours., Disp: , Rfl: 12  •  alfuzosin (UROXATRAL) 10 MG 24 hr tablet, TAKE ONE TABLET BY MOUTH DAILY, Disp: 30 tablet, Rfl: 0  •  ALPRAZolam (XANAX) 0.5 MG tablet, Take 1 tablet by mouth Every Night., Disp: 30 tablet, Rfl: 0  •  aspirin 81 MG EC tablet, Take 1 tablet by mouth Daily., Disp: 90 tablet, Rfl: 3  •  atorvastatin (LIPITOR) 10 MG tablet, Take 1 tablet by mouth Every Night., Disp: , Rfl:   •  baclofen (LIORESAL) 10 MG tablet, TAKE ONE TABLET BY MOUTH THREE TIMES A DAY, Disp: 90 tablet, Rfl: 0  •  BREO ELLIPTA 200-25 MCG/INH aerosol powder , Inhale 1 each Daily., Disp: 60 each, Rfl: 5  •  budesonide (PULMICORT) 0.5 MG/2ML nebulizer solution, Take 2 mL by nebulization 2 (Two) Times a Day., Disp: , Rfl:   •  calcium carbonate (OS-HAFSA) 600 MG tablet, Take 600 mg by mouth 2 (Two) Times a Day With Meals., Disp: , Rfl:   •  ciprofloxacin (CIPRO) 500 MG tablet, Take 1 tablet by mouth 2 (Two) Times a Day for 7 days., Disp: 14 tablet, Rfl: 0  •  CloNIDine (CATAPRES) 0.1 MG tablet, Take 1 tablet by mouth 2 (Two) Times a Day. When BP higher than 160, Disp: 20 tablet, Rfl: 0  •  clopidogrel (PLAVIX) 75 MG tablet, TAKE ONE TABLET BY MOUTH EVERY NIGHT AT BEDTIME, Disp: 30 tablet, Rfl: 0  •  escitalopram (LEXAPRO) 20 MG tablet, Take 1 tablet by mouth Daily., Disp: 30 tablet, Rfl: 6  •  finasteride (PROSCAR) 5 MG tablet, TAKE ONE TABLET BY MOUTH DAILY, Disp: 30 tablet, Rfl: 0  •  fluticasone (FLONASE) 50 MCG/ACT nasal spray, 1 spray into each nostril Daily. (Patient taking differently: 1 spray into the  nostril(s) as directed by provider Daily As Needed.), Disp: 16 g, Rfl: 5  •  furosemide (LASIX) 20 MG tablet, TAKE ONE TABLET BY MOUTH DAILY, Disp: 30 tablet, Rfl: 0  •  ipratropium-albuterol (DUO-NEB) 0.5-2.5 mg/mL nebulizer, Take 3 mL by nebulization Every 6 (Six) Hours., Disp: 360 mL, Rfl:   •  iron polysaccharides (NIFEREX) 150 MG capsule, Take 150 mg by mouth 2 (Two) Times a Day., Disp: , Rfl:   •  melatonin 5 MG sublingual tablet sublingual tablet, Place 1 tablet under the tongue Every Night., Disp: , Rfl:   •  metoprolol tartrate (LOPRESSOR) 25 MG tablet, Take 1 tablet by mouth Every 12 (Twelve) Hours., Disp: 180 tablet, Rfl: 3  •  mirtazapine (REMERON) 15 MG tablet, Take 15 mg by mouth Daily., Disp: , Rfl:   •  O2 (OXYGEN), Inhale 4 L/min 1 (One) Time. Walking on 4L and sitting 3L, Disp: , Rfl:   •  oxybutynin (DITROPAN) 5 MG tablet, TAKE ONE TABLET BY MOUTH AT BEDTIME, Disp: 30 tablet, Rfl: 0  •  pantoprazole (PROTONIX) 40 MG EC tablet, TAKE ONE TABLET BY MOUTH DAILY, Disp: 30 tablet, Rfl: 0  •  predniSONE (DELTASONE) 10 MG tablet, TAKE ONE TABLET BY MOUTH DAILY, Disp: 30 tablet, Rfl: 0  •  PROAIR  (90 BASE) MCG/ACT inhaler, Inhale 2 puffs Every 4 (Four) Hours As Needed for Wheezing or Shortness of Air., Disp: , Rfl:   •  spironolactone (ALDACTONE) 25 MG tablet, TAKE ONE TABLET BY MOUTH DAILY, Disp: 30 tablet, Rfl: 2  •  traMADol (ULTRAM) 50 MG tablet, Take 1 tablet by mouth Every 8 (Eight) Hours As Needed for Moderate Pain ., Disp: 30 tablet, Rfl: 5    Allergies  Allergies   Allergen Reactions   • Mucomyst [Acetylcysteine] Other (See Comments)     bronchospasms   • Milk-Related Compounds Nausea And Vomiting   • Sulfa Antibiotics Nausea And Vomiting       Social History  Social History     Socioeconomic History   • Marital status:      Spouse name: Not on file   • Number of children: Not on file   • Years of education: Not on file   • Highest education level: Not on file   Occupational History  "    Employer: RETIRED   Tobacco Use   • Smoking status: Former Smoker     Packs/day: 3.00     Years: 30.00     Pack years: 90.00     Types: Cigarettes     Last attempt to quit: 2017     Years since quittin.4   • Smokeless tobacco: Never Used   Substance and Sexual Activity   • Alcohol use: No   • Drug use: No   • Sexual activity: Defer       Family History  He has no family history of prostate cancer    Review of Systems  Constitutional: No fevers or chills  Skin: Negative for rash  Endocrine: No heat/cold intolerance   Cardiovascular: Negative for chest pain or dyspnea on exertion  Respiratory: Negative for shortness of breath or wheezing  Gastrointestinal: No nausea or vomiting  Genitourinary: Negative for current gross hematuria.  Musculoskeletal: No flank pain  Neurological:  Negative for frequent headaches or dizziness  Lymph/Heme: Negative for leg swelling or calf pain.    Physical Exam  Visit Vitals  /64   Pulse 82   Ht 172.7 cm (67.99\")   Wt 74.8 kg (164 lb 14.5 oz)   SpO2 96%   BMI 25.08 kg/m²     Constitutional: NAD, WDWN.   HEENT: NCAT. Conjunctivae normal.  MMM.    Cardiovascular: Regular rate.  Pulmonary/Chest: Respirations are even and non-labored bilaterally.  Abdominal: Soft. No distension, tenderness, masses or guarding. No CVA tenderness.  Neurological: A + O x 3.  Cranial Nerves II-XII grossly intact. Normal gait.  Extremities: CYNTHIA x 4, Warm. No clubbing.  No cyanosis.    Skin: Pink, warm and dry.  No rashes noted.  Psychiatric:  Normal mood and affect    Genitourinary  deferred    Labs  Lab Results   Component Value Date    PSA 0.800 2017    PSA 0.60 2015       Brief Urine Lab Results  (Last result in the past 365 days)      Color   Clarity   Blood   Leuk Est   Nitrite   Protein   CREAT   Urine HCG        19 1323 Yellow Clear 3+ Moderate (2+) Positive Negative             PVR  Post-void residual performed by staff - 21      Assessment  Mr. Pena is a 69 y.o. male who " presents with LUTS, primarily nocturia and weak stream and recent gross hematuria over the past 2 days.  He likely had a urinary tract infection yesterday and has been placed on ciprofloxacin.  His gross hematuria has resolved.  He continues to have significant lower urinary tract symptoms while on maximal medical therapy.    Plan  1.  CT stone study  2.  Follow-up for cystoscopy, Uroflow; we discussed office-based options for treatment of his urinary symptoms, given his multiple medical comorbidities      Corby Lawler MD

## 2019-06-09 DIAGNOSIS — N39.0 URINARY TRACT INFECTION WITH HEMATURIA, SITE UNSPECIFIED: Primary | ICD-10-CM

## 2019-06-09 DIAGNOSIS — R31.9 URINARY TRACT INFECTION WITH HEMATURIA, SITE UNSPECIFIED: Primary | ICD-10-CM

## 2019-06-09 LAB
ALBUMIN SERPL-MCNC: 4.1 G/DL (ref 3.5–5)
ALBUMIN/GLOB SERPL: 1.6 G/DL (ref 1–2)
ALP SERPL-CCNC: 76 U/L (ref 38–126)
ALT SERPL-CCNC: 26 U/L (ref 13–69)
AST SERPL-CCNC: 21 U/L (ref 15–46)
BACTERIA UR CULT: ABNORMAL
BACTERIA UR CULT: ABNORMAL
BASOPHILS # BLD AUTO: 0.08 10*3/MM3 (ref 0–0.2)
BASOPHILS NFR BLD AUTO: 0.6 % (ref 0–1.5)
BILIRUB SERPL-MCNC: 2.1 MG/DL (ref 0.2–1.3)
BUN SERPL-MCNC: 44 MG/DL (ref 7–20)
BUN/CREAT SERPL: 29.3 (ref 6.3–21.9)
CALCIUM SERPL-MCNC: 10.3 MG/DL (ref 8.4–10.2)
CHLORIDE SERPL-SCNC: 95 MMOL/L (ref 98–107)
CO2 SERPL-SCNC: 37 MMOL/L (ref 26–30)
CREAT SERPL-MCNC: 1.5 MG/DL (ref 0.6–1.3)
EOSINOPHIL # BLD AUTO: 0.25 10*3/MM3 (ref 0–0.4)
EOSINOPHIL NFR BLD AUTO: 1.7 % (ref 0.3–6.2)
ERYTHROCYTE [DISTWIDTH] IN BLOOD BY AUTOMATED COUNT: 13.6 % (ref 12.3–15.4)
GLOBULIN SER CALC-MCNC: 2.6 GM/DL
GLUCOSE SERPL-MCNC: 105 MG/DL (ref 74–98)
HCT VFR BLD AUTO: 40.3 % (ref 37.5–51)
HGB BLD-MCNC: 12.4 G/DL (ref 13–17.7)
IMM GRANULOCYTES # BLD AUTO: 0.07 10*3/MM3 (ref 0–0.05)
IMM GRANULOCYTES NFR BLD AUTO: 0.5 % (ref 0–0.5)
LYMPHOCYTES # BLD AUTO: 1.35 10*3/MM3 (ref 0.7–3.1)
LYMPHOCYTES NFR BLD AUTO: 9.3 % (ref 19.6–45.3)
MCH RBC QN AUTO: 29.5 PG (ref 26.6–33)
MCHC RBC AUTO-ENTMCNC: 30.8 G/DL (ref 31.5–35.7)
MCV RBC AUTO: 96 FL (ref 79–97)
MONOCYTES # BLD AUTO: 1.54 10*3/MM3 (ref 0.1–0.9)
MONOCYTES NFR BLD AUTO: 10.6 % (ref 5–12)
NEUTROPHILS # BLD AUTO: 11.21 10*3/MM3 (ref 1.7–7)
NEUTROPHILS NFR BLD AUTO: 77.3 % (ref 42.7–76)
NRBC BLD AUTO-RTO: 0 /100 WBC (ref 0–0.2)
OTHER ANTIBIOTIC SUSC ISLT: ABNORMAL
PLATELET # BLD AUTO: 210 10*3/MM3 (ref 140–450)
POTASSIUM SERPL-SCNC: 3.9 MMOL/L (ref 3.5–5.1)
PROT SERPL-MCNC: 6.7 G/DL (ref 6.3–8.2)
RBC # BLD AUTO: 4.2 10*6/MM3 (ref 4.14–5.8)
SODIUM SERPL-SCNC: 141 MMOL/L (ref 137–145)
WBC # BLD AUTO: 14.5 10*3/MM3 (ref 3.4–10.8)

## 2019-06-09 RX ORDER — AMOXICILLIN AND CLAVULANATE POTASSIUM 875; 125 MG/1; MG/1
1 TABLET, FILM COATED ORAL 2 TIMES DAILY
Qty: 14 TABLET | Refills: 0 | Status: SHIPPED | OUTPATIENT
Start: 2019-06-09 | End: 2019-06-16

## 2019-06-11 ENCOUNTER — APPOINTMENT (OUTPATIENT)
Dept: CT IMAGING | Facility: HOSPITAL | Age: 69
End: 2019-06-11

## 2019-06-11 RX ORDER — ALPRAZOLAM 0.5 MG/1
TABLET ORAL
Qty: 30 TABLET | Refills: 0 | OUTPATIENT
Start: 2019-06-11

## 2019-06-12 ENCOUNTER — HOSPITAL ENCOUNTER (OUTPATIENT)
Dept: CT IMAGING | Facility: HOSPITAL | Age: 69
Discharge: HOME | End: 2019-06-12
Admitting: UROLOGY

## 2019-06-12 DIAGNOSIS — R31.9 HEMATURIA, UNSPECIFIED TYPE: ICD-10-CM

## 2019-06-12 PROCEDURE — 74176 CT ABD & PELVIS W/O CONTRAST: CPT

## 2019-06-13 ENCOUNTER — PROCEDURE VISIT (OUTPATIENT)
Dept: UROLOGY | Facility: CLINIC | Age: 69
End: 2019-06-13

## 2019-06-13 VITALS — HEIGHT: 68 IN | BODY MASS INDEX: 24.86 KG/M2 | RESPIRATION RATE: 18 BRPM | WEIGHT: 164 LBS

## 2019-06-13 DIAGNOSIS — R39.9 LOWER URINARY TRACT SYMPTOMS (LUTS): Primary | ICD-10-CM

## 2019-06-13 PROCEDURE — 52000 CYSTOURETHROSCOPY: CPT | Performed by: UROLOGY

## 2019-06-13 PROCEDURE — 51741 ELECTRO-UROFLOWMETRY FIRST: CPT | Performed by: UROLOGY

## 2019-06-14 ENCOUNTER — TELEPHONE (OUTPATIENT)
Dept: CARDIOLOGY | Facility: CLINIC | Age: 69
End: 2019-06-14

## 2019-06-14 ENCOUNTER — TELEPHONE (OUTPATIENT)
Dept: UROLOGY | Facility: CLINIC | Age: 69
End: 2019-06-14

## 2019-06-14 NOTE — PROGRESS NOTES
Chief Complaint  LUTS  Gross hematuria  UTI    HPI  Mr. Pena is a 69 y.o. male with history of severe COPD, CHF, A. fib, BPH, nephrolithiasis who presented with recent episode of gross hematuria.    He presents today for FU. Decision was made to perform cystoscopy.     To review,  Primary symptom includes: GH, increased urgency and frequency  He has battled BPH for many years he says. Has been on Rx for many years.   Nocturia 3-4  + weak stream  He is unhappy with his urinary Sx  Previous/current treatments include: Uroxatrol and finasteride, as well as oxybutynin.    yes Constipation  Yes history of kidney stones  He takes Plavix for his CAD    IPSS Questionnaire (AUA-7):  Over the past month…    1)  Incomplete Emptying  How often have you had a sensation of not emptying your bladder?  3 - About half the time   2)  Frequency  How often have you had to urinate less than every two hours? 4 - More than half the time   3)  Intermittency  How often have you found you stopped and started again several times when you urinated?  3 - About half the time   4) Urgency  How often have you found it difficult to postpone urination?  3 - About half the time   5) Weak Stream  How often have you had a weak urinary stream?  2 - Less than half the time   6) Straining  How often have you had to push or strain to begin urination?  3 - About half the time   7) Nocturia  How many times did you typically get up at night to urinate?  3 - 3 times   Total Score:  21       Quality of life due to urinary symptoms:  If you were to spend the rest of your life with your urinary condition the way it is now, how would you feel about that? 5-Unhappy   Urine Leakage (Incontinence) 0-No Leakage         Past Medical History  Past Medical History:   Diagnosis Date   • Abnormal liver enzymes    • Anemia    • Anxiety    • Arthritis    • Atherosclerotic heart disease of native coronary artery without angina pectoris    • Atrial fibrillation (CMS/HCC)     • Back pain    • BPH (benign prostatic hyperplasia)    • Cataract, bilateral    • Chest pain     2-3 MONTHS AGO.  PER PT, HAS ADDRESSED WITH CARDIOLOGIST.  STATES HAS NTG PRN.   • CHF (congestive heart failure) (CMS/HCC)    • Chronic bronchitis (CMS/HCC)    • Chronic kidney disease    • COPD (chronic obstructive pulmonary disease) (CMS/HCC)    • Degeneration of cervical intervertebral disc    • Depression    • Diverticulosis    • Dyspnea    • Esophageal reflux    • Esophagitis    • Gastritis    • Hematuria    • Hemorrhoids    • Hiatal hernia    • History of arthritis    • History of nuclear stress test     UNSURE OF DATE   • History of peripheral neuropathy    • History of ventricular septal defect    • History of ventricular septal defect    • Hyperlipidemia    • Hypertension    • Infectious diarrhea    • Kyphosis, acquired    • Lumbar radiculopathy    • Mitral and aortic valve disease    • Nephrolithiasis    • Phimosis    • Problems with swallowing     WITH FOOD   • Respiratory failure (CMS/HCC)    • Sleep apnea     BIPAP   • TIA (transient ischemic attack)     X3.   2014   • Ventral hernia    • Wears glasses     FOR READING       Past Surgical History  Past Surgical History:   Procedure Laterality Date   • CARDIAC CATHETERIZATION     • CARDIAC CATHETERIZATION N/A 5/24/2018    Procedure: Left Heart Cath;  Surgeon: Edouard Robertson MD;  Location:  [facility]ATH IN[facility]ASIVE [facility];  Service: Cardiovascular   • CARDIAC ELECTROPHYSIOLOGY PROCEDURE N/A 1/31/2019    Procedure: PACEMAKER IMPLANTATION- DC;  Surgeon: Omar Encinas DO;  Location:  [facility] EP INVASIVE LOCATION;  Service: Cardiology   • ENDOSCOPY N/A 1/26/2019    Procedure: ESOPHAGOGASTRODUODENOSCOPY;  Surgeon: Brunner, Mark I, MD;  Location:  [facility];  Service: Gastroenterology   • HERNIA REPAIR     • ORTHOPEDIC SURGERY Left     Left hip arthroplasty   • PACEMAKER IMPLANTATION  01/31/2019   • SUPRAPUBIC CATHETER INSERTION      History of Percutaneous  Catheter Placement Into Ureter   • THROAT SURGERY      FOR SLEEP APNEA   • VSD REPAIR      History of VSD Repair By Patch Closure       Medications    Current Outpatient Medications:   •  acetaminophen (TYLENOL) 325 MG tablet, Take 2 tablets by mouth 4 (Four) Times a Day As Needed for Mild Pain , Headache or Fever., Disp: , Rfl:   •  albuterol (PROVENTIL) (2.5 MG/3ML) 0.083% nebulizer solution, Take 2.5 mg by nebulization Every 6 (Six) Hours., Disp: , Rfl: 12  •  alfuzosin (UROXATRAL) 10 MG 24 hr tablet, TAKE ONE TABLET BY MOUTH DAILY, Disp: 30 tablet, Rfl: 0  •  ALPRAZolam (XANAX) 0.5 MG tablet, Take 1 tablet by mouth Every Night., Disp: 30 tablet, Rfl: 0  •  amoxicillin-clavulanate (AUGMENTIN) 875-125 MG per tablet, Take 1 tablet by mouth 2 (Two) Times a Day for 7 days., Disp: 14 tablet, Rfl: 0  •  aspirin 81 MG EC tablet, Take 1 tablet by mouth Daily., Disp: 90 tablet, Rfl: 3  •  atorvastatin (LIPITOR) 10 MG tablet, Take 1 tablet by mouth Every Night., Disp: , Rfl:   •  baclofen (LIORESAL) 10 MG tablet, TAKE ONE TABLET BY MOUTH THREE TIMES A DAY, Disp: 90 tablet, Rfl: 0  •  BREO ELLIPTA 200-25 MCG/INH aerosol powder , Inhale 1 each Daily., Disp: 60 each, Rfl: 5  •  budesonide (PULMICORT) 0.5 MG/2ML nebulizer solution, Take 2 mL by nebulization 2 (Two) Times a Day., Disp: , Rfl:   •  calcium carbonate (OS-HAFSA) 600 MG tablet, Take 600 mg by mouth 2 (Two) Times a Day With Meals., Disp: , Rfl:   •  CloNIDine (CATAPRES) 0.1 MG tablet, Take 1 tablet by mouth 2 (Two) Times a Day. When BP higher than 160, Disp: 20 tablet, Rfl: 0  •  clopidogrel (PLAVIX) 75 MG tablet, TAKE ONE TABLET BY MOUTH EVERY NIGHT AT BEDTIME, Disp: 30 tablet, Rfl: 0  •  escitalopram (LEXAPRO) 20 MG tablet, Take 1 tablet by mouth Daily., Disp: 30 tablet, Rfl: 6  •  finasteride (PROSCAR) 5 MG tablet, TAKE ONE TABLET BY MOUTH DAILY, Disp: 30 tablet, Rfl: 0  •  fluticasone (FLONASE) 50 MCG/ACT nasal spray, 1 spray into each nostril Daily. (Patient  taking differently: 1 spray into the nostril(s) as directed by provider Daily As Needed.), Disp: 16 g, Rfl: 5  •  furosemide (LASIX) 20 MG tablet, TAKE ONE TABLET BY MOUTH DAILY, Disp: 30 tablet, Rfl: 0  •  ipratropium-albuterol (DUO-NEB) 0.5-2.5 mg/mL nebulizer, Take 3 mL by nebulization Every 6 (Six) Hours., Disp: 360 mL, Rfl:   •  iron polysaccharides (NIFEREX) 150 MG capsule, Take 150 mg by mouth 2 (Two) Times a Day., Disp: , Rfl:   •  melatonin 5 MG sublingual tablet sublingual tablet, Place 1 tablet under the tongue Every Night., Disp: , Rfl:   •  metoprolol tartrate (LOPRESSOR) 25 MG tablet, Take 1 tablet by mouth Every 12 (Twelve) Hours., Disp: 180 tablet, Rfl: 3  •  mirtazapine (REMERON) 15 MG tablet, Take 15 mg by mouth Daily., Disp: , Rfl:   •  O2 (OXYGEN), Inhale 4 L/min 1 (One) Time. Walking on 4L and sitting 3L, Disp: , Rfl:   •  oxybutynin (DITROPAN) 5 MG tablet, TAKE ONE TABLET BY MOUTH AT BEDTIME, Disp: 30 tablet, Rfl: 0  •  pantoprazole (PROTONIX) 40 MG EC tablet, TAKE ONE TABLET BY MOUTH DAILY, Disp: 30 tablet, Rfl: 0  •  predniSONE (DELTASONE) 10 MG tablet, TAKE ONE TABLET BY MOUTH DAILY, Disp: 30 tablet, Rfl: 0  •  PROAIR  (90 BASE) MCG/ACT inhaler, Inhale 2 puffs Every 4 (Four) Hours As Needed for Wheezing or Shortness of Air., Disp: , Rfl:   •  spironolactone (ALDACTONE) 25 MG tablet, TAKE ONE TABLET BY MOUTH DAILY, Disp: 30 tablet, Rfl: 2  •  traMADol (ULTRAM) 50 MG tablet, Take 1 tablet by mouth Every 8 (Eight) Hours As Needed for Moderate Pain ., Disp: 30 tablet, Rfl: 5    Allergies  Allergies   Allergen Reactions   • Mucomyst [Acetylcysteine] Other (See Comments)     bronchospasms   • Milk-Related Compounds Nausea And Vomiting   • Sulfa Antibiotics Nausea And Vomiting       Social History  Social History     Socioeconomic History   • Marital status:      Spouse name: Not on file   • Number of children: Not on file   • Years of education: Not on file   • Highest education  "level: Not on file   Occupational History     Employer: RETIRED   Tobacco Use   • Smoking status: Former Smoker     Packs/day: 3.00     Years: 30.00     Pack years: 90.00     Types: Cigarettes     Last attempt to quit: 2017     Years since quittin.4   • Smokeless tobacco: Never Used   Substance and Sexual Activity   • Alcohol use: No   • Drug use: No   • Sexual activity: Defer       Family History  He has no family history of prostate cancer    Review of Systems  Constitutional: No fevers or chills  Skin: Negative for rash  Endocrine: No heat/cold intolerance   Cardiovascular: Negative for chest pain or dyspnea on exertion  Respiratory: Negative for shortness of breath or wheezing  Gastrointestinal: No nausea or vomiting  Genitourinary: Negative for current gross hematuria.  Musculoskeletal: No flank pain  Neurological:  Negative for frequent headaches or dizziness  Lymph/Heme: Negative for leg swelling or calf pain.    Physical Exam  Visit Vitals  Resp 18   Ht 172.7 cm (67.99\")   Wt 74.4 kg (164 lb)   BMI 24.94 kg/m²     Constitutional: NAD, WDWN.   HEENT: NCAT. Conjunctivae normal.  MMM.    Cardiovascular: Regular rate.  Pulmonary/Chest: Respirations are even and non-labored bilaterally.  Abdominal: Soft. No distension, tenderness, masses or guarding. No CVA tenderness.  Neurological: A + O x 3.  Cranial Nerves II-XII grossly intact. Normal gait.  Extremities: CYNTHIA x 4, Warm. No clubbing.  No cyanosis.    Skin: Pink, warm and dry.  No rashes noted.  Psychiatric:  Normal mood and affect    Genitourinary  deferred    Labs  Lab Results   Component Value Date    PSA 0.800 2017    PSA 0.60 2015       Brief Urine Lab Results  (Last result in the past 365 days)      Color   Clarity   Blood   Leuk Est   Nitrite   Protein   CREAT   Urine HCG        19 1323 Yellow  Comment:  REFERENCE RANGE: Yellow, Straw Cloudy See below:  Comment:  Large (3+) See below:  Comment:  Moderate (2+) Positive Negative      "    06/06/19 1323 Yellow Clear 3+ Moderate (2+) Positive Negative             PVR  Post-void residual performed by staff - 21    Preprocedure diagnosis  LUTS  Gross hematuria    Postprocedure diagnosis  Same    Procedure  Flexible Cystourethroscopy    Attending surgeon  Corby Lawler MD    Anesthesia  2% lidocaine jelly intraurethrally    Complications  None    Indications  69 y.o. male undergoing a flexible cystoscopy for the above mentioned indications.  Informed consent was obtained.      Findings  Cystoscopic findings included one right and left ureteral orifice in the normal anatomic position with normal bladder mucosa and no tumors, masses or stones. The urethral urothelium was within normal limits with no strictures.  There was an intravesical prominent median lobe.  The lateral lobes were also taught, and short but obstructive in appearance.      Procedure  The patient was placed in supine position and prepped and draped in sterile fashion with lidocaine jelly per urethra for anesthesia.  A timeout was performed.  The 14F flexible cystoscope was lubricated and gently placed through the penile urethra and into the bladder.  The bladder was completely visualized.  The cystoscope was retroflexed and the bladder neck and prostate visualized.  The cystoscope was slowly withdrawn while visualizing the urethra and the procedure terminated.  The patient tolerated the procedure well.      Uroflow:  Peak flow rate - 16 mL/sec  Average flow rate - 4 mL/sec  Flow curve - long flat, with some staccato jagged spikes  Voided volume - 202 mL  PVR performed by staff - 98 mL    I personally reviewed  and interpreted this study.       Assessment  Mr. Pena is a 69 y.o. male who presents with LUTS, primarily nocturia and weak stream and recent gross hematuria over the past 2 days.  He likely had a urinary tract infection yesterday and has been placed on ciprofloxacin.  His gross hematuria has resolved.  He continues to  have significant lower urinary tract symptoms while on maximal medical therapy. He was found to have obstruction with median lobe on cystoscopy and uroflow today. He also has significant phimosis.     Plan  1.  Get pulmonary and cardiac clearance; he will need to be off his plavix prior to TURP or Urolift  2.  FU in 2-3 weeks to discuss Urolift vs TURP, +/- dorsal slit      Corby Lawler MD

## 2019-06-14 NOTE — TELEPHONE ENCOUNTER
I spoke with Kaur Lopez's office, scheduled patient for surgery clearance appointment July 5 th 2019.

## 2019-06-17 DIAGNOSIS — N32.81 OAB (OVERACTIVE BLADDER): ICD-10-CM

## 2019-06-17 RX ORDER — CHOLECALCIFEROL (VITAMIN D3) 125 MCG
CAPSULE ORAL
Qty: 30 TABLET | Refills: 0 | Status: SHIPPED | OUTPATIENT
Start: 2019-06-17 | End: 2019-07-07 | Stop reason: SDUPTHER

## 2019-06-17 RX ORDER — CLOPIDOGREL BISULFATE 75 MG/1
TABLET ORAL
Qty: 30 TABLET | Refills: 0 | Status: SHIPPED | OUTPATIENT
Start: 2019-06-17 | End: 2019-07-22 | Stop reason: SDUPTHER

## 2019-06-17 RX ORDER — OXYBUTYNIN CHLORIDE 5 MG/1
TABLET ORAL
Qty: 30 TABLET | Refills: 0 | Status: SHIPPED | OUTPATIENT
Start: 2019-06-17 | End: 2019-07-22 | Stop reason: SDUPTHER

## 2019-06-17 RX ORDER — ALPRAZOLAM 0.5 MG/1
TABLET ORAL
Qty: 30 TABLET | Refills: 0 | Status: SHIPPED | OUTPATIENT
Start: 2019-06-17 | End: 2019-07-07 | Stop reason: SDUPTHER

## 2019-06-25 RX ORDER — PANTOPRAZOLE SODIUM 40 MG/1
TABLET, DELAYED RELEASE ORAL
Qty: 30 TABLET | Refills: 0 | Status: SHIPPED | OUTPATIENT
Start: 2019-06-25 | End: 2019-08-05 | Stop reason: SDUPTHER

## 2019-06-25 RX ORDER — FINASTERIDE 5 MG/1
TABLET, FILM COATED ORAL
Qty: 30 TABLET | Refills: 0 | Status: SHIPPED | OUTPATIENT
Start: 2019-06-25 | End: 2019-07-22 | Stop reason: SDUPTHER

## 2019-06-25 RX ORDER — FUROSEMIDE 20 MG/1
TABLET ORAL
Qty: 30 TABLET | Refills: 0 | Status: SHIPPED | OUTPATIENT
Start: 2019-06-25 | End: 2019-07-22 | Stop reason: SDUPTHER

## 2019-06-25 RX ORDER — ALFUZOSIN HYDROCHLORIDE 10 MG/1
TABLET, EXTENDED RELEASE ORAL
Qty: 30 TABLET | Refills: 0 | Status: SHIPPED | OUTPATIENT
Start: 2019-06-25 | End: 2019-07-22 | Stop reason: SDUPTHER

## 2019-06-25 RX ORDER — PREDNISONE 10 MG/1
TABLET ORAL
Qty: 30 TABLET | Refills: 0 | Status: SHIPPED | OUTPATIENT
Start: 2019-06-25 | End: 2019-07-22 | Stop reason: SDUPTHER

## 2019-07-01 RX ORDER — BACLOFEN 10 MG/1
TABLET ORAL
Qty: 90 TABLET | Refills: 0 | Status: SHIPPED | OUTPATIENT
Start: 2019-07-01 | End: 2019-07-29 | Stop reason: SDUPTHER

## 2019-07-08 RX ORDER — CHOLECALCIFEROL (VITAMIN D3) 125 MCG
CAPSULE ORAL
Qty: 30 TABLET | Refills: 0 | Status: SHIPPED | OUTPATIENT
Start: 2019-07-08 | End: 2019-07-29 | Stop reason: SDUPTHER

## 2019-07-08 RX ORDER — ALPRAZOLAM 0.5 MG/1
TABLET ORAL
Qty: 30 TABLET | Refills: 0 | Status: SHIPPED | OUTPATIENT
Start: 2019-07-08 | End: 2019-08-01 | Stop reason: SDUPTHER

## 2019-07-16 ENCOUNTER — OFFICE VISIT (OUTPATIENT)
Dept: UROLOGY | Facility: CLINIC | Age: 69
End: 2019-07-16

## 2019-07-16 VITALS — TEMPERATURE: 97.8 F | HEIGHT: 68 IN | BODY MASS INDEX: 24.86 KG/M2 | RESPIRATION RATE: 18 BRPM | WEIGHT: 164 LBS

## 2019-07-16 DIAGNOSIS — J44.9 STEROID-DEPENDENT COPD (HCC): ICD-10-CM

## 2019-07-16 DIAGNOSIS — N18.30 CHRONIC KIDNEY DISEASE, STAGE III (MODERATE) (HCC): ICD-10-CM

## 2019-07-16 DIAGNOSIS — R39.9 LOWER URINARY TRACT SYMPTOMS (LUTS): Primary | ICD-10-CM

## 2019-07-16 PROCEDURE — 99213 OFFICE O/P EST LOW 20 MIN: CPT | Performed by: UROLOGY

## 2019-07-16 RX ORDER — PHENAZOPYRIDINE HYDROCHLORIDE 100 MG/1
100 TABLET, FILM COATED ORAL 3 TIMES DAILY PRN
Qty: 30 TABLET | Refills: 0 | Status: SHIPPED | OUTPATIENT
Start: 2019-07-16 | End: 2019-07-19

## 2019-07-16 RX ORDER — SULFAMETHOXAZOLE AND TRIMETHOPRIM 800; 160 MG/1; MG/1
1 TABLET ORAL 2 TIMES DAILY
Qty: 6 TABLET | Refills: 0 | Status: ON HOLD | OUTPATIENT
Start: 2019-07-16 | End: 2019-08-29

## 2019-07-16 RX ORDER — ACETAMINOPHEN 325 MG/1
650 TABLET ORAL EVERY 6 HOURS
Qty: 32 TABLET | Refills: 0 | Status: SHIPPED | OUTPATIENT
Start: 2019-07-16 | End: 2019-07-20

## 2019-07-16 NOTE — PATIENT INSTRUCTIONS
Dr. Lawler's Preoperative Instructions Before and After UroLift Procedure  The following instructions will help you care for yourself, or be cared for before and after your procedure.      Diet  Drink plenty of liquids and eat light meals today.    It is very important to treat plenty of fluids before and especially after your procedure.     Anesthesia Precautions & Expectations  You may have been prescribed a medication to help with anxiety and to provide some anesthesia during her procedure (lorazepam). This medication will make you feel funny and possibly lightheaded.  After anesthesia, rest for 24 hours.    Do not drive, drink alcoholic beverages or make any important decisions during this time.  You will need someone to drive you the day of your procedure.    Please take all 4 the medications 1 hour prior to your procedure.      What to expect during the procedure  During the procedure you will be in stirrups with a drape up so that you cannot see the procedure.  We will use a telescope to place the sutures, similar to the one you had in the office in the past.   You can expect to feel urinary urgency or a great desire to urinate. This is normal.  To numb the prostate, we will instill numbing jelly in your bladder 20 min prior.     We will give you an antibiotic to be taken one hour ahead of time that day and for the next 3 days.   We will also give you an anti-inflammatory medication and a medication for urinary burning to be taken starting that day and for the next few days.     Activity  Start normal activities in twenty-four (24) hours.    Wound Care and Hygiene  No restrictions, start normal routine.    What to Expect after Surgery  Mild pain with voiding.  Frequency or urgency - expect these to occur for 3-6 weeks after surgery. Call if these are severe and we will give a medication to help with them.  Bladder cramps.  Minimal bleeding with voiding.    Call your Doctor  Passing clots in urine  preventing bladder emptying  Severe pain not controlled by oral medication  Temperature above 101.5 degrees  Inability to urinate within eight (8) hours after surgery    Other Contacts  Urology Office:  [address] [facility] [id number]   [place] [id number] [phone] office  [phone] fax

## 2019-07-16 NOTE — PROGRESS NOTES
Chief Complaint  LUTS  Gross hematuria  UTI    HPI  Mr. Pena is a 69 y.o. male with history of severe COPD, CHF, A. fib, BPH, nephrolithiasis who presented with recent episode of gross hematuria.    He presents today for FU.  He has seen his cardiologist Dr. Merlos and has been cleared to stop his Plavix.    To review,  Primary symptom includes: GH, increased urgency and frequency  He has battled BPH for many years he says. Has been on Rx for many years.   Nocturia 3-4  + weak stream  He is unhappy with his urinary Sx  Previous/current treatments include: Uroxatrol and finasteride, as well as oxybutynin.    yes Constipation  Yes history of kidney stones  He takes Plavix for his CAD    IPSS Questionnaire (AUA-7):  Over the past month…    1)  Incomplete Emptying  How often have you had a sensation of not emptying your bladder?  3 - About half the time   2)  Frequency  How often have you had to urinate less than every two hours? 4 - More than half the time   3)  Intermittency  How often have you found you stopped and started again several times when you urinated?  3 - About half the time   4) Urgency  How often have you found it difficult to postpone urination?  3 - About half the time   5) Weak Stream  How often have you had a weak urinary stream?  2 - Less than half the time   6) Straining  How often have you had to push or strain to begin urination?  3 - About half the time   7) Nocturia  How many times did you typically get up at night to urinate?  3 - 3 times   Total Score:  21       Quality of life due to urinary symptoms:  If you were to spend the rest of your life with your urinary condition the way it is now, how would you feel about that? 5-Unhappy   Urine Leakage (Incontinence) 0-No Leakage         Past Medical History  Past Medical History:   Diagnosis Date   • Abnormal liver enzymes    • Anemia    • Anxiety    • Arthritis    • Atherosclerotic heart disease of native coronary artery without angina  pectoris    • Atrial fibrillation (CMS/HCC)    • Back pain    • BPH (benign prostatic hyperplasia)    • Cataract, bilateral    • Chest pain     2-3 MONTHS AGO.  PER PT, HAS ADDRESSED WITH CARDIOLOGIST.  STATES HAS NTG PRN.   • CHF (congestive heart failure) (CMS/HCC)    • Chronic bronchitis (CMS/HCC)    • Chronic kidney disease    • COPD (chronic obstructive pulmonary disease) (CMS/HCC)    • Degeneration of cervical intervertebral disc    • Depression    • Diverticulosis    • Dyspnea    • Esophageal reflux    • Esophagitis    • Gastritis    • Hematuria    • Hemorrhoids    • Hiatal hernia    • History of arthritis    • History of nuclear stress test     UNSURE OF DATE   • History of peripheral neuropathy    • History of ventricular septal defect    • History of ventricular septal defect    • Hyperlipidemia    • Hypertension    • Infectious diarrhea    • Kyphosis, acquired    • Lumbar radiculopathy    • Mitral and aortic valve disease    • Nephrolithiasis    • Phimosis    • Problems with swallowing     WITH FOOD   • Respiratory failure (CMS/HCC)    • Sleep apnea     BIPAP   • TIA (transient ischemic attack)     X3.   2014   • Ventral hernia    • Wears glasses     FOR READING       Past Surgical History  Past Surgical History:   Procedure Laterality Date   • CARDIAC CATHETERIZATION     • CARDIAC CATHETERIZATION N/A 5/24/2018    Procedure: Left Heart Cath;  Surgeon: Edouard Robertson MD;  Location:  [facility] CATH INVASIVE LOCATION;  Service: Cardiovascular   • CARDIAC ELECTROPHYSIOLOGY PROCEDURE N/A 1/31/2019    Procedure: PACEMAKER IMPLANTATION- DC;  Surgeon: Omar Encinas DO;  Location:  [facility] EP INVASIVE LOCATION;  Service: Cardiology   • ENDOSCOPY N/A 1/26/2019    Procedure: ESOPHAGOGASTRODUODENOSCOPY;  Surgeon: Brunner, Mark I, MD;  Location:  [facility];  Service: Gastroenterology   • HERNIA REPAIR     • ORTHOPEDIC SURGERY Left     Left hip arthroplasty   • PACEMAKER IMPLANTATION  01/31/2019   • SUPRAPUBIC  CATHETER INSERTION      History of Percutaneous Catheter Placement Into Ureter   • THROAT SURGERY      FOR SLEEP APNEA   • VSD REPAIR      History of VSD Repair By Patch Closure       Medications    Current Outpatient Medications:   •  acetaminophen (TYLENOL) 325 MG tablet, Take 2 tablets by mouth 4 (Four) Times a Day As Needed for Mild Pain , Headache or Fever., Disp: , Rfl:   •  albuterol (PROVENTIL) (2.5 MG/3ML) 0.083% nebulizer solution, Take 2.5 mg by nebulization Every 6 (Six) Hours., Disp: , Rfl: 12  •  alfuzosin (UROXATRAL) 10 MG 24 hr tablet, TAKE ONE TABLET BY MOUTH DAILY, Disp: 30 tablet, Rfl: 0  •  ALPRAZolam (XANAX) 0.5 MG tablet, Take 1 tablet by mouth Every Night., Disp: 30 tablet, Rfl: 0  •  ALPRAZolam (XANAX) 0.5 MG tablet, TAKE TABLET BY MOUTH ONCE NIGHTLY AS NEEDED FOR ANXIETY, Disp: 30 tablet, Rfl: 0  •  aspirin 81 MG EC tablet, Take 1 tablet by mouth Daily., Disp: 90 tablet, Rfl: 3  •  atorvastatin (LIPITOR) 10 MG tablet, Take 1 tablet by mouth Every Night., Disp: , Rfl:   •  baclofen (LIORESAL) 10 MG tablet, TAKE ONE TABLET BY MOUTH THREE TIMES A DAY, Disp: 90 tablet, Rfl: 0  •  BREO ELLIPTA 200-25 MCG/INH aerosol powder , Inhale 1 each Daily., Disp: 60 each, Rfl: 5  •  budesonide (PULMICORT) 0.5 MG/2ML nebulizer solution, Take 2 mL by nebulization 2 (Two) Times a Day., Disp: , Rfl:   •  calcium carbonate (OS-HAFSA) 600 MG tablet, Take 600 mg by mouth 2 (Two) Times a Day With Meals., Disp: , Rfl:   •  CloNIDine (CATAPRES) 0.1 MG tablet, Take 1 tablet by mouth 2 (Two) Times a Day. When BP higher than 160, Disp: 20 tablet, Rfl: 0  •  clopidogrel (PLAVIX) 75 MG tablet, TAKE ONE TABLET BY MOUTH EVERY NIGHT AT BEDTIME, Disp: 30 tablet, Rfl: 0  •  escitalopram (LEXAPRO) 20 MG tablet, Take 1 tablet by mouth Daily., Disp: 30 tablet, Rfl: 6  •  finasteride (PROSCAR) 5 MG tablet, TAKE ONE TABLET BY MOUTH DAILY, Disp: 30 tablet, Rfl: 0  •  fluticasone (FLONASE) 50 MCG/ACT nasal spray, 1 spray into each  nostril Daily. (Patient taking differently: 1 spray into the nostril(s) as directed by provider Daily As Needed.), Disp: 16 g, Rfl: 5  •  furosemide (LASIX) 20 MG tablet, TAKE ONE TABLET BY MOUTH DAILY, Disp: 30 tablet, Rfl: 0  •  ipratropium-albuterol (DUO-NEB) 0.5-2.5 mg/mL nebulizer, Take 3 mL by nebulization Every 6 (Six) Hours., Disp: 360 mL, Rfl:   •  iron polysaccharides (NIFEREX) 150 MG capsule, Take 150 mg by mouth 2 (Two) Times a Day., Disp: , Rfl:   •  melatonin 5 MG sublingual tablet sublingual tablet, Place 1 tablet under the tongue Every Night., Disp: , Rfl:   •  melatonin 5 MG tablet tablet, TAKE ONE TABLET BY MOUTH EVERY NIGHT AT BEDTIME, Disp: 30 tablet, Rfl: 0  •  metoprolol tartrate (LOPRESSOR) 25 MG tablet, Take 1 tablet by mouth Every 12 (Twelve) Hours., Disp: 180 tablet, Rfl: 3  •  mirtazapine (REMERON) 15 MG tablet, Take 15 mg by mouth Daily., Disp: , Rfl:   •  O2 (OXYGEN), Inhale 4 L/min 1 (One) Time. Walking on 4L and sitting 3L, Disp: , Rfl:   •  oxybutynin (DITROPAN) 5 MG tablet, TAKE ONE TABLET BY MOUTH AT BEDTIME, Disp: 30 tablet, Rfl: 0  •  pantoprazole (PROTONIX) 40 MG EC tablet, TAKE ONE TABLET BY MOUTH DAILY, Disp: 30 tablet, Rfl: 0  •  predniSONE (DELTASONE) 10 MG tablet, TAKE ONE TABLET BY MOUTH DAILY, Disp: 30 tablet, Rfl: 0  •  PROAIR  (90 BASE) MCG/ACT inhaler, Inhale 2 puffs Every 4 (Four) Hours As Needed for Wheezing or Shortness of Air., Disp: , Rfl:   •  spironolactone (ALDACTONE) 25 MG tablet, TAKE ONE TABLET BY MOUTH DAILY, Disp: 30 tablet, Rfl: 2  •  traMADol (ULTRAM) 50 MG tablet, Take 1 tablet by mouth Every 8 (Eight) Hours As Needed for Moderate Pain ., Disp: 30 tablet, Rfl: 5    Allergies  Allergies   Allergen Reactions   • Mucomyst [Acetylcysteine] Other (See Comments)     bronchospasms   • Milk-Related Compounds Nausea And Vomiting   • Sulfa Antibiotics Nausea And Vomiting       Social History  Social History     Socioeconomic History   • Marital status:  "     Spouse name: Not on file   • Number of children: Not on file   • Years of education: Not on file   • Highest education level: Not on file   Occupational History     Employer: RETIRED   Tobacco Use   • Smoking status: Former Smoker     Packs/day: 3.00     Years: 30.00     Pack years: 90.00     Types: Cigarettes     Last attempt to quit: 2017     Years since quittin.5   • Smokeless tobacco: Never Used   Substance and Sexual Activity   • Alcohol use: No   • Drug use: No   • Sexual activity: Defer       Family History  He has no family history of prostate cancer    Review of Systems  Constitutional: No fevers or chills  Skin: Negative for rash  Endocrine: No heat/cold intolerance   Cardiovascular: Negative for chest pain or dyspnea on exertion  Respiratory: Negative for shortness of breath or wheezing  Gastrointestinal: No nausea or vomiting  Genitourinary: Negative for current gross hematuria.  Musculoskeletal: No flank pain  Neurological:  Negative for frequent headaches or dizziness  Lymph/Heme: Negative for leg swelling or calf pain.    Physical Exam  Visit Vitals  Temp 97.8 °F (36.6 °C)   Resp 18   Ht 172.7 cm (67.99\")   Wt 74.4 kg (164 lb)   BMI 24.94 kg/m²     Constitutional: NAD, WDWN.   HEENT: NCAT. Conjunctivae normal.  MMM.    Cardiovascular: Regular rate.  Pulmonary/Chest: Respirations are even and non-labored bilaterally.  Abdominal: Soft. No distension, tenderness, masses or guarding. No CVA tenderness.  Neurological: A + O x 3.  Cranial Nerves II-XII grossly intact. Normal gait.  Extremities: CYNTHIA x 4, Warm. No clubbing.  No cyanosis.    Skin: Pink, warm and dry.  No rashes noted.  Psychiatric:  Normal mood and affect    Genitourinary  deferred    Labs  Lab Results   Component Value Date    PSA 0.800 2017    PSA 0.60 2015       Brief Urine Lab Results  (Last result in the past 365 days)      Color   Clarity   Blood   Leuk Est   Nitrite   Protein   CREAT   Urine HCG        " 06/06/19 1323 Yellow  Comment:  REFERENCE RANGE: Yellow, Straw Cloudy See below:  Comment:  Large (3+) See below:  Comment:  Moderate (2+) Positive Negative         06/06/19 1323 Yellow Clear 3+ Moderate (2+) Positive Negative             PVR  Post-void residual performed by staff - 21    Preprocedure diagnosis  LUTS  Gross hematuria    Postprocedure diagnosis  Same    Procedure  Flexible Cystourethroscopy    Attending surgeon  Corby Lawler MD    Anesthesia  2% lidocaine jelly intraurethrally    Complications  None    Indications  69 y.o. male undergoing a flexible cystoscopy for the above mentioned indications.  Informed consent was obtained.      Findings  Cystoscopic findings included one right and left ureteral orifice in the normal anatomic position with normal bladder mucosa and no tumors, masses or stones. The urethral urothelium was within normal limits with no strictures.  There was an intravesical prominent median lobe.  The lateral lobes were also taught, and short but obstructive in appearance.      Procedure  The patient was placed in supine position and prepped and draped in sterile fashion with lidocaine jelly per urethra for anesthesia.  A timeout was performed.  The 14F flexible cystoscope was lubricated and gently placed through the penile urethra and into the bladder.  The bladder was completely visualized.  The cystoscope was retroflexed and the bladder neck and prostate visualized.  The cystoscope was slowly withdrawn while visualizing the urethra and the procedure terminated.  The patient tolerated the procedure well.      Uroflow:  Peak flow rate - 16 mL/sec  Average flow rate - 4 mL/sec  Flow curve - long flat, with some staccato jagged spikes  Voided volume - 202 mL  PVR performed by staff - 98 mL    I personally reviewed  and interpreted this study.       Assessment  Mr. Pena is a 69 y.o. male who presents with LUTS, primarily nocturia and weak stream and recent gross hematuria.  He likely had a urinary tract infection yesterday and has been placed on ciprofloxacin.  His gross hematuria has resolved.  He continues to have significant lower urinary tract symptoms while on maximal medical therapy. He was found to have obstruction with median lobe on cystoscopy and uroflow. He also has significant phimosis.     We reviewed the pros and cons of UroLift versus TURP.  Based on his overall health we rationalized that it may be a better idea to at least try a UroLift first in the office, as it would be no wrist to his lungs with his significant COPD.  If we are not able to successfully treat his median lobe in the office then we can always do it in the operating room.  We reviewed the risks, benefits, and alternatives to UroLift.  We as well discussed dorsal slit for his significant phimosis.  He voiced his understanding of all this and wished to proceed.    Plan  1.  Schedule for Urolift on 8/9/19 and dorsal slit next week  2.  Stop plavix 5 days prior to procedures  3.  RX sent to pharmacy, janel Lawler MD

## 2019-07-22 DIAGNOSIS — N32.81 OAB (OVERACTIVE BLADDER): ICD-10-CM

## 2019-07-22 RX ORDER — FINASTERIDE 5 MG/1
TABLET, FILM COATED ORAL
Qty: 30 TABLET | Refills: 0 | Status: SHIPPED | OUTPATIENT
Start: 2019-07-22 | End: 2019-08-19 | Stop reason: SDUPTHER

## 2019-07-22 RX ORDER — CLOPIDOGREL BISULFATE 75 MG/1
TABLET ORAL
Qty: 30 TABLET | Refills: 0 | Status: SHIPPED | OUTPATIENT
Start: 2019-07-22 | End: 2019-08-19 | Stop reason: SDUPTHER

## 2019-07-22 RX ORDER — PREDNISONE 10 MG/1
TABLET ORAL
Qty: 30 TABLET | Refills: 0 | Status: SHIPPED | OUTPATIENT
Start: 2019-07-22 | End: 2019-08-19 | Stop reason: SDUPTHER

## 2019-07-22 RX ORDER — FUROSEMIDE 20 MG/1
TABLET ORAL
Qty: 30 TABLET | Refills: 0 | Status: SHIPPED | OUTPATIENT
Start: 2019-07-22 | End: 2019-08-19 | Stop reason: SDUPTHER

## 2019-07-22 RX ORDER — OXYBUTYNIN CHLORIDE 5 MG/1
TABLET ORAL
Qty: 30 TABLET | Refills: 0 | Status: SHIPPED | OUTPATIENT
Start: 2019-07-22 | End: 2019-08-19 | Stop reason: SDUPTHER

## 2019-07-22 RX ORDER — ALFUZOSIN HYDROCHLORIDE 10 MG/1
TABLET, EXTENDED RELEASE ORAL
Qty: 30 TABLET | Refills: 0 | Status: SHIPPED | OUTPATIENT
Start: 2019-07-22 | End: 2019-08-19 | Stop reason: SDUPTHER

## 2019-07-29 RX ORDER — BACLOFEN 10 MG/1
TABLET ORAL
Qty: 90 TABLET | Refills: 0 | Status: SHIPPED | OUTPATIENT
Start: 2019-07-29 | End: 2019-08-31 | Stop reason: HOSPADM

## 2019-07-30 RX ORDER — ALPRAZOLAM 0.5 MG/1
TABLET ORAL
Qty: 30 TABLET | Refills: 0 | OUTPATIENT
Start: 2019-07-30

## 2019-07-30 RX ORDER — CHOLECALCIFEROL (VITAMIN D3) 125 MCG
CAPSULE ORAL
Qty: 30 TABLET | Refills: 0 | Status: SHIPPED | OUTPATIENT
Start: 2019-07-30 | End: 2019-08-13 | Stop reason: SDUPTHER

## 2019-08-02 RX ORDER — ALPRAZOLAM 0.5 MG/1
TABLET ORAL
Qty: 30 TABLET | Refills: 0 | Status: SHIPPED | OUTPATIENT
Start: 2019-08-02 | End: 2019-08-31 | Stop reason: HOSPADM

## 2019-08-05 RX ORDER — PANTOPRAZOLE SODIUM 40 MG/1
TABLET, DELAYED RELEASE ORAL
Qty: 30 TABLET | Refills: 0 | Status: SHIPPED | OUTPATIENT
Start: 2019-08-05 | End: 2019-08-31 | Stop reason: HOSPADM

## 2019-08-13 RX ORDER — MELATONIN 5 MG
TABLET ORAL
Qty: 30 TABLET | Refills: 0 | Status: SHIPPED | OUTPATIENT
Start: 2019-08-13 | End: 2019-08-31 | Stop reason: HOSPADM

## 2019-08-19 DIAGNOSIS — N32.81 OAB (OVERACTIVE BLADDER): ICD-10-CM

## 2019-08-19 RX ORDER — FUROSEMIDE 20 MG/1
TABLET ORAL
Qty: 30 TABLET | Refills: 0 | Status: SHIPPED | OUTPATIENT
Start: 2019-08-19 | End: 2019-08-31 | Stop reason: HOSPADM

## 2019-08-19 RX ORDER — ALFUZOSIN HYDROCHLORIDE 10 MG/1
TABLET, EXTENDED RELEASE ORAL
Qty: 30 TABLET | Refills: 0 | Status: SHIPPED | OUTPATIENT
Start: 2019-08-19 | End: 2019-08-31 | Stop reason: HOSPADM

## 2019-08-19 RX ORDER — FINASTERIDE 5 MG/1
TABLET, FILM COATED ORAL
Qty: 30 TABLET | Refills: 0 | Status: SHIPPED | OUTPATIENT
Start: 2019-08-19 | End: 2019-08-31 | Stop reason: HOSPADM

## 2019-08-19 RX ORDER — PREDNISONE 10 MG/1
TABLET ORAL
Qty: 30 TABLET | Refills: 0 | Status: SHIPPED | OUTPATIENT
Start: 2019-08-19 | End: 2019-08-31 | Stop reason: HOSPADM

## 2019-08-19 RX ORDER — CLOPIDOGREL BISULFATE 75 MG/1
TABLET ORAL
Qty: 30 TABLET | Refills: 0 | Status: SHIPPED | OUTPATIENT
Start: 2019-08-19 | End: 2019-08-31 | Stop reason: HOSPADM

## 2019-08-19 RX ORDER — OXYBUTYNIN CHLORIDE 5 MG/1
TABLET ORAL
Qty: 30 TABLET | Refills: 0 | Status: SHIPPED | OUTPATIENT
Start: 2019-08-19 | End: 2019-08-31 | Stop reason: HOSPADM

## 2019-08-29 ENCOUNTER — APPOINTMENT (OUTPATIENT)
Dept: GENERAL RADIOLOGY | Facility: HOSPITAL | Age: 69
End: 2019-08-29

## 2019-08-29 ENCOUNTER — HOSPITAL ENCOUNTER (INPATIENT)
Facility: HOSPITAL | Age: 69
LOS: 1 days | Discharge: ANOTHER HOSPITAL | End: 2019-08-31
Attending: STUDENT IN AN ORGANIZED HEALTH CARE EDUCATION/TRAINING PROGRAM | Admitting: INTERNAL MEDICINE

## 2019-08-29 ENCOUNTER — APPOINTMENT (OUTPATIENT)
Dept: CT IMAGING | Facility: HOSPITAL | Age: 69
End: 2019-08-29

## 2019-08-29 ENCOUNTER — APPOINTMENT (OUTPATIENT)
Dept: ULTRASOUND IMAGING | Facility: HOSPITAL | Age: 69
End: 2019-08-29

## 2019-08-29 DIAGNOSIS — N39.0 URINARY TRACT INFECTION WITHOUT HEMATURIA, SITE UNSPECIFIED: ICD-10-CM

## 2019-08-29 DIAGNOSIS — G93.41 ACUTE METABOLIC ENCEPHALOPATHY: Primary | ICD-10-CM

## 2019-08-29 DIAGNOSIS — N28.9 ACUTE RENAL INSUFFICIENCY: ICD-10-CM

## 2019-08-29 DIAGNOSIS — N13.9 OBSTRUCTIVE UROPATHY: ICD-10-CM

## 2019-08-29 PROBLEM — N17.9 ACUTE RENAL FAILURE (ARF) (HCC): Status: ACTIVE | Noted: 2019-08-29

## 2019-08-29 PROBLEM — Z79.899 POLYPHARMACY: Status: ACTIVE | Noted: 2019-08-29

## 2019-08-29 PROBLEM — J96.12 CHRONIC RESPIRATORY FAILURE WITH HYPERCAPNIA (HCC): Status: ACTIVE | Noted: 2019-08-29

## 2019-08-29 LAB
A-A DO2: ABNORMAL
ALBUMIN SERPL-MCNC: 4.4 G/DL (ref 3.5–5.2)
ALBUMIN/GLOB SERPL: 1.5 G/DL
ALP SERPL-CCNC: 80 U/L (ref 39–117)
ALT SERPL W P-5'-P-CCNC: 10 U/L (ref 1–41)
AMPHET+METHAMPHET UR QL: NEGATIVE
AMPHETAMINES UR QL: NEGATIVE
ANION GAP SERPL CALCULATED.3IONS-SCNC: 12.6 MMOL/L (ref 5–15)
ARTERIAL PATENCY WRIST A: POSITIVE
AST SERPL-CCNC: 14 U/L (ref 1–40)
ATMOSPHERIC PRESS: 738 MMHG
BACTERIA UR QL AUTO: ABNORMAL /HPF
BARBITURATES UR QL SCN: NEGATIVE
BASE EXCESS BLDA CALC-SCNC: 12 MMOL/L (ref 0–2)
BASOPHILS # BLD AUTO: 0.01 10*3/MM3 (ref 0–0.2)
BASOPHILS NFR BLD AUTO: 0.1 % (ref 0–1.5)
BDY SITE: ABNORMAL
BENZODIAZ UR QL SCN: POSITIVE
BILIRUB SERPL-MCNC: 2.1 MG/DL (ref 0.2–1.2)
BILIRUB UR QL STRIP: NEGATIVE
BUN BLD-MCNC: 48 MG/DL (ref 8–23)
BUN/CREAT SERPL: 17.5 (ref 7–25)
BUPRENORPHINE SERPL-MCNC: NEGATIVE NG/ML
CALCIUM SPEC-SCNC: 11 MG/DL (ref 8.6–10.5)
CANNABINOIDS SERPL QL: NEGATIVE
CHLORIDE SERPL-SCNC: 93 MMOL/L (ref 98–107)
CK SERPL-CCNC: 57 U/L (ref 20–200)
CLARITY UR: ABNORMAL
CO2 SERPL-SCNC: 38.4 MMOL/L (ref 22–29)
COCAINE UR QL: NEGATIVE
COHGB MFR BLD: 1.4 % (ref 0–2)
COLOR UR: YELLOW
CREAT BLD-MCNC: 2.74 MG/DL (ref 0.76–1.27)
D-LACTATE SERPL-SCNC: 1.5 MMOL/L (ref 0.5–2)
DEPRECATED RDW RBC AUTO: 43.4 FL (ref 37–54)
EOSINOPHIL # BLD AUTO: 0.01 10*3/MM3 (ref 0–0.4)
EOSINOPHIL NFR BLD AUTO: 0.1 % (ref 0.3–6.2)
ERYTHROCYTE [DISTWIDTH] IN BLOOD BY AUTOMATED COUNT: 12.6 % (ref 12.3–15.4)
ETHANOL BLD-MCNC: <10 MG/DL (ref 0–10)
ETHANOL UR QL: <0.01 %
GAS FLOW AIRWAY: 2 LPM
GFR SERPL CREATININE-BSD FRML MDRD: 23 ML/MIN/1.73
GLOBULIN UR ELPH-MCNC: 3 GM/DL
GLUCOSE BLD-MCNC: 151 MG/DL (ref 65–99)
GLUCOSE BLDC GLUCOMTR-MCNC: 100 MG/DL (ref 70–130)
GLUCOSE BLDC GLUCOMTR-MCNC: 109 MG/DL (ref 70–130)
GLUCOSE UR STRIP-MCNC: NEGATIVE MG/DL
HCO3 BLDA-SCNC: 39.2 MMOL/L (ref 22–28)
HCT VFR BLD AUTO: 40.4 % (ref 37.5–51)
HCT VFR BLD CALC: 38 %
HGB BLD-MCNC: 12.6 G/DL (ref 13–17.7)
HGB BLDA-MCNC: 12.4 G/DL (ref 12–18)
HGB UR QL STRIP.AUTO: NEGATIVE
HOROWITZ INDEX BLD+IHG-RTO: 28 %
HYALINE CASTS UR QL AUTO: ABNORMAL /LPF
IMM GRANULOCYTES # BLD AUTO: 0.03 10*3/MM3 (ref 0–0.05)
IMM GRANULOCYTES NFR BLD AUTO: 0.4 % (ref 0–0.5)
KETONES UR QL STRIP: NEGATIVE
LEUKOCYTE ESTERASE UR QL STRIP.AUTO: ABNORMAL
LYMPHOCYTES # BLD AUTO: 1.01 10*3/MM3 (ref 0.7–3.1)
LYMPHOCYTES NFR BLD AUTO: 12.7 % (ref 19.6–45.3)
MCH RBC QN AUTO: 29.6 PG (ref 26.6–33)
MCHC RBC AUTO-ENTMCNC: 31.2 G/DL (ref 31.5–35.7)
MCV RBC AUTO: 94.8 FL (ref 79–97)
METHADONE UR QL SCN: NEGATIVE
METHGB BLD QL: 0.7 % (ref 0–1.5)
MODALITY: ABNORMAL
MONOCYTES # BLD AUTO: 0.47 10*3/MM3 (ref 0.1–0.9)
MONOCYTES NFR BLD AUTO: 5.9 % (ref 5–12)
NEUTROPHILS # BLD AUTO: 6.42 10*3/MM3 (ref 1.7–7)
NEUTROPHILS NFR BLD AUTO: 80.8 % (ref 42.7–76)
NITRITE UR QL STRIP: NEGATIVE
NOTE: ABNORMAL
NRBC BLD AUTO-RTO: 0 /100 WBC (ref 0–0.2)
OPIATES UR QL: NEGATIVE
OXYCODONE UR QL SCN: NEGATIVE
OXYHGB MFR BLDV: 89.5 % (ref 94–99)
PCO2 BLDA: 62.5 MM HG (ref 35–45)
PCO2 TEMP ADJ BLD: ABNORMAL MM[HG]
PCP UR QL SCN: NEGATIVE
PH BLDA: 7.41 PH UNITS (ref 7.3–7.5)
PH UR STRIP.AUTO: 6 [PH] (ref 5–8)
PH, TEMP CORRECTED: ABNORMAL
PLATELET # BLD AUTO: 180 10*3/MM3 (ref 140–450)
PMV BLD AUTO: 9 FL (ref 6–12)
PO2 BLDA: 61.5 MM HG (ref 75–100)
PO2 TEMP ADJ BLD: ABNORMAL MM[HG]
POTASSIUM BLD-SCNC: 4.5 MMOL/L (ref 3.5–5.2)
PROCALCITONIN SERPL-MCNC: 0.12 NG/ML (ref 0.1–0.25)
PROPOXYPH UR QL: NEGATIVE
PROT SERPL-MCNC: 7.4 G/DL (ref 6–8.5)
PROT UR QL STRIP: NEGATIVE
RBC # BLD AUTO: 4.26 10*6/MM3 (ref 4.14–5.8)
RBC # UR: ABNORMAL /HPF
REF LAB TEST METHOD: ABNORMAL
SAO2 % BLDCOA: 91.4 % (ref 94–100)
SODIUM BLD-SCNC: 144 MMOL/L (ref 136–145)
SP GR UR STRIP: 1.01 (ref 1–1.03)
SQUAMOUS #/AREA URNS HPF: ABNORMAL /HPF
TRICYCLICS UR QL SCN: NEGATIVE
TROPONIN T SERPL-MCNC: 0.01 NG/ML (ref 0–0.03)
TROPONIN T SERPL-MCNC: 0.03 NG/ML (ref 0–0.03)
UROBILINOGEN UR QL STRIP: ABNORMAL
VENTILATOR MODE: ABNORMAL
WBC NRBC COR # BLD: 7.95 10*3/MM3 (ref 3.4–10.8)
WBC UR QL AUTO: ABNORMAL /HPF

## 2019-08-29 PROCEDURE — 81001 URINALYSIS AUTO W/SCOPE: CPT | Performed by: STUDENT IN AN ORGANIZED HEALTH CARE EDUCATION/TRAINING PROGRAM

## 2019-08-29 PROCEDURE — 82550 ASSAY OF CK (CPK): CPT | Performed by: INTERNAL MEDICINE

## 2019-08-29 PROCEDURE — 99285 EMERGENCY DEPT VISIT HI MDM: CPT

## 2019-08-29 PROCEDURE — 70450 CT HEAD/BRAIN W/O DYE: CPT

## 2019-08-29 PROCEDURE — 5A09357 ASSISTANCE WITH RESPIRATORY VENTILATION, LESS THAN 24 CONSECUTIVE HOURS, CONTINUOUS POSITIVE AIRWAY PRESSURE: ICD-10-PCS | Performed by: INTERNAL MEDICINE

## 2019-08-29 PROCEDURE — G0378 HOSPITAL OBSERVATION PER HR: HCPCS

## 2019-08-29 PROCEDURE — 87186 SC STD MICRODIL/AGAR DIL: CPT | Performed by: STUDENT IN AN ORGANIZED HEALTH CARE EDUCATION/TRAINING PROGRAM

## 2019-08-29 PROCEDURE — 83050 HGB METHEMOGLOBIN QUAN: CPT

## 2019-08-29 PROCEDURE — 94660 CPAP INITIATION&MGMT: CPT

## 2019-08-29 PROCEDURE — 82962 GLUCOSE BLOOD TEST: CPT

## 2019-08-29 PROCEDURE — 94799 UNLISTED PULMONARY SVC/PX: CPT

## 2019-08-29 PROCEDURE — 87086 URINE CULTURE/COLONY COUNT: CPT | Performed by: STUDENT IN AN ORGANIZED HEALTH CARE EDUCATION/TRAINING PROGRAM

## 2019-08-29 PROCEDURE — 87088 URINE BACTERIA CULTURE: CPT | Performed by: STUDENT IN AN ORGANIZED HEALTH CARE EDUCATION/TRAINING PROGRAM

## 2019-08-29 PROCEDURE — 84145 PROCALCITONIN (PCT): CPT | Performed by: STUDENT IN AN ORGANIZED HEALTH CARE EDUCATION/TRAINING PROGRAM

## 2019-08-29 PROCEDURE — 87040 BLOOD CULTURE FOR BACTERIA: CPT | Performed by: STUDENT IN AN ORGANIZED HEALTH CARE EDUCATION/TRAINING PROGRAM

## 2019-08-29 PROCEDURE — 76775 US EXAM ABDO BACK WALL LIM: CPT

## 2019-08-29 PROCEDURE — 93005 ELECTROCARDIOGRAM TRACING: CPT | Performed by: STUDENT IN AN ORGANIZED HEALTH CARE EDUCATION/TRAINING PROGRAM

## 2019-08-29 PROCEDURE — 80307 DRUG TEST PRSMV CHEM ANLYZR: CPT | Performed by: STUDENT IN AN ORGANIZED HEALTH CARE EDUCATION/TRAINING PROGRAM

## 2019-08-29 PROCEDURE — 99223 1ST HOSP IP/OBS HIGH 75: CPT | Performed by: INTERNAL MEDICINE

## 2019-08-29 PROCEDURE — 80053 COMPREHEN METABOLIC PANEL: CPT | Performed by: STUDENT IN AN ORGANIZED HEALTH CARE EDUCATION/TRAINING PROGRAM

## 2019-08-29 PROCEDURE — 25010000002 CEFTRIAXONE SODIUM-DEXTROSE 1-3.74 GM-%(50ML) RECONSTITUTED SOLUTION: Performed by: STUDENT IN AN ORGANIZED HEALTH CARE EDUCATION/TRAINING PROGRAM

## 2019-08-29 PROCEDURE — 82375 ASSAY CARBOXYHB QUANT: CPT

## 2019-08-29 PROCEDURE — 71045 X-RAY EXAM CHEST 1 VIEW: CPT

## 2019-08-29 PROCEDURE — 84484 ASSAY OF TROPONIN QUANT: CPT | Performed by: STUDENT IN AN ORGANIZED HEALTH CARE EDUCATION/TRAINING PROGRAM

## 2019-08-29 PROCEDURE — 36600 WITHDRAWAL OF ARTERIAL BLOOD: CPT

## 2019-08-29 PROCEDURE — 84484 ASSAY OF TROPONIN QUANT: CPT | Performed by: INTERNAL MEDICINE

## 2019-08-29 PROCEDURE — 80306 DRUG TEST PRSMV INSTRMNT: CPT | Performed by: STUDENT IN AN ORGANIZED HEALTH CARE EDUCATION/TRAINING PROGRAM

## 2019-08-29 PROCEDURE — 25010000002 ENOXAPARIN PER 10 MG: Performed by: INTERNAL MEDICINE

## 2019-08-29 PROCEDURE — 82805 BLOOD GASES W/O2 SATURATION: CPT

## 2019-08-29 PROCEDURE — 51702 INSERT TEMP BLADDER CATH: CPT

## 2019-08-29 PROCEDURE — 83605 ASSAY OF LACTIC ACID: CPT | Performed by: STUDENT IN AN ORGANIZED HEALTH CARE EDUCATION/TRAINING PROGRAM

## 2019-08-29 PROCEDURE — 85025 COMPLETE CBC W/AUTO DIFF WBC: CPT | Performed by: STUDENT IN AN ORGANIZED HEALTH CARE EDUCATION/TRAINING PROGRAM

## 2019-08-29 RX ORDER — FINASTERIDE 5 MG/1
5 TABLET, FILM COATED ORAL DAILY
Status: DISCONTINUED | OUTPATIENT
Start: 2019-08-29 | End: 2019-08-31 | Stop reason: HOSPADM

## 2019-08-29 RX ORDER — ASPIRIN 81 MG/1
81 TABLET ORAL DAILY
Status: DISCONTINUED | OUTPATIENT
Start: 2019-08-29 | End: 2019-08-31 | Stop reason: HOSPADM

## 2019-08-29 RX ORDER — PANTOPRAZOLE SODIUM 40 MG/1
40 TABLET, DELAYED RELEASE ORAL DAILY
Status: DISCONTINUED | OUTPATIENT
Start: 2019-08-29 | End: 2019-08-31 | Stop reason: HOSPADM

## 2019-08-29 RX ORDER — CEFTRIAXONE 1 G/50ML
1 INJECTION, SOLUTION INTRAVENOUS ONCE
Status: COMPLETED | OUTPATIENT
Start: 2019-08-29 | End: 2019-08-29

## 2019-08-29 RX ORDER — CLOPIDOGREL BISULFATE 75 MG/1
75 TABLET ORAL DAILY
Status: DISCONTINUED | OUTPATIENT
Start: 2019-08-29 | End: 2019-08-31 | Stop reason: HOSPADM

## 2019-08-29 RX ORDER — ESCITALOPRAM OXALATE 10 MG/1
10 TABLET ORAL DAILY
Status: DISCONTINUED | OUTPATIENT
Start: 2019-08-29 | End: 2019-08-31 | Stop reason: HOSPADM

## 2019-08-29 RX ORDER — ATORVASTATIN CALCIUM 10 MG/1
10 TABLET, FILM COATED ORAL NIGHTLY
Status: DISCONTINUED | OUTPATIENT
Start: 2019-08-29 | End: 2019-08-31 | Stop reason: HOSPADM

## 2019-08-29 RX ORDER — SODIUM CHLORIDE 0.9 % (FLUSH) 0.9 %
10 SYRINGE (ML) INJECTION EVERY 12 HOURS SCHEDULED
Status: DISCONTINUED | OUTPATIENT
Start: 2019-08-29 | End: 2019-08-31 | Stop reason: HOSPADM

## 2019-08-29 RX ORDER — BUDESONIDE 0.5 MG/2ML
0.5 INHALANT ORAL
Status: DISCONTINUED | OUTPATIENT
Start: 2019-08-29 | End: 2019-08-31 | Stop reason: HOSPADM

## 2019-08-29 RX ORDER — NICOTINE POLACRILEX 4 MG
1 LOZENGE BUCCAL
Status: DISCONTINUED | OUTPATIENT
Start: 2019-08-29 | End: 2019-08-31 | Stop reason: HOSPADM

## 2019-08-29 RX ORDER — MULTIPLE VITAMINS W/ MINERALS TAB 9MG-400MCG
1 TAB ORAL DAILY
COMMUNITY
End: 2019-08-31 | Stop reason: HOSPADM

## 2019-08-29 RX ORDER — CEFTRIAXONE 1 G/50ML
1 INJECTION, SOLUTION INTRAVENOUS EVERY 24 HOURS
Status: DISCONTINUED | OUTPATIENT
Start: 2019-08-30 | End: 2019-08-31 | Stop reason: HOSPADM

## 2019-08-29 RX ORDER — ACETAMINOPHEN 325 MG/1
650 TABLET ORAL 4 TIMES DAILY PRN
Status: DISCONTINUED | OUTPATIENT
Start: 2019-08-29 | End: 2019-08-31 | Stop reason: HOSPADM

## 2019-08-29 RX ORDER — DEXTROSE MONOHYDRATE 25 G/50ML
25 INJECTION, SOLUTION INTRAVENOUS
Status: DISCONTINUED | OUTPATIENT
Start: 2019-08-29 | End: 2019-08-31 | Stop reason: HOSPADM

## 2019-08-29 RX ORDER — IPRATROPIUM BROMIDE AND ALBUTEROL SULFATE 2.5; .5 MG/3ML; MG/3ML
3 SOLUTION RESPIRATORY (INHALATION)
Status: DISCONTINUED | OUTPATIENT
Start: 2019-08-29 | End: 2019-08-31 | Stop reason: HOSPADM

## 2019-08-29 RX ORDER — ONDANSETRON 2 MG/ML
4 INJECTION INTRAMUSCULAR; INTRAVENOUS EVERY 6 HOURS PRN
Status: DISCONTINUED | OUTPATIENT
Start: 2019-08-29 | End: 2019-08-31 | Stop reason: HOSPADM

## 2019-08-29 RX ORDER — SODIUM CHLORIDE, SODIUM LACTATE, POTASSIUM CHLORIDE, CALCIUM CHLORIDE 600; 310; 30; 20 MG/100ML; MG/100ML; MG/100ML; MG/100ML
75 INJECTION, SOLUTION INTRAVENOUS CONTINUOUS
Status: DISCONTINUED | OUTPATIENT
Start: 2019-08-29 | End: 2019-08-30

## 2019-08-29 RX ORDER — CALCIUM CARBONATE 500(1250)
500 TABLET ORAL 2 TIMES DAILY
Status: DISCONTINUED | OUTPATIENT
Start: 2019-08-29 | End: 2019-08-31 | Stop reason: HOSPADM

## 2019-08-29 RX ORDER — SODIUM CHLORIDE 0.9 % (FLUSH) 0.9 %
10 SYRINGE (ML) INJECTION AS NEEDED
Status: DISCONTINUED | OUTPATIENT
Start: 2019-08-29 | End: 2019-08-31 | Stop reason: HOSPADM

## 2019-08-29 RX ADMIN — BUDESONIDE 0.5 MG: 0.5 INHALANT RESPIRATORY (INHALATION) at 19:55

## 2019-08-29 RX ADMIN — SODIUM CHLORIDE, POTASSIUM CHLORIDE, SODIUM LACTATE AND CALCIUM CHLORIDE 75 ML/HR: 600; 310; 30; 20 INJECTION, SOLUTION INTRAVENOUS at 16:08

## 2019-08-29 RX ADMIN — CEFTRIAXONE 1 G: 1 INJECTION, SOLUTION INTRAVENOUS at 13:22

## 2019-08-29 RX ADMIN — ENOXAPARIN SODIUM 30 MG: 30 INJECTION SUBCUTANEOUS at 15:51

## 2019-08-29 RX ADMIN — SODIUM CHLORIDE 1000 ML: 9 INJECTION, SOLUTION INTRAVENOUS at 12:33

## 2019-08-29 RX ADMIN — IPRATROPIUM BROMIDE AND ALBUTEROL SULFATE 3 ML: .5; 3 SOLUTION RESPIRATORY (INHALATION) at 19:55

## 2019-08-30 ENCOUNTER — APPOINTMENT (OUTPATIENT)
Dept: CT IMAGING | Facility: HOSPITAL | Age: 69
End: 2019-08-30

## 2019-08-30 LAB
A-A DO2: ABNORMAL
A-A DO2: ABNORMAL
ALBUMIN SERPL-MCNC: 3.6 G/DL (ref 3.5–5.2)
ALBUMIN/GLOB SERPL: 1.5 G/DL
ALP SERPL-CCNC: 64 U/L (ref 39–117)
ALT SERPL W P-5'-P-CCNC: 8 U/L (ref 1–41)
AMMONIA BLD-SCNC: 22 UMOL/L (ref 16–60)
ANION GAP SERPL CALCULATED.3IONS-SCNC: 12.1 MMOL/L (ref 5–15)
ARTERIAL PATENCY WRIST A: ABNORMAL
ARTERIAL PATENCY WRIST A: POSITIVE
AST SERPL-CCNC: 11 U/L (ref 1–40)
ATMOSPHERIC PRESS: 735 MMHG
ATMOSPHERIC PRESS: 736 MMHG
BASE EXCESS BLDA CALC-SCNC: 12.3 MMOL/L (ref 0–2)
BASE EXCESS BLDA CALC-SCNC: 12.3 MMOL/L (ref 0–2)
BASOPHILS # BLD AUTO: 0.02 10*3/MM3 (ref 0–0.2)
BASOPHILS NFR BLD AUTO: 0.2 % (ref 0–1.5)
BDY SITE: ABNORMAL
BDY SITE: ABNORMAL
BILIRUB SERPL-MCNC: 1 MG/DL (ref 0.2–1.2)
BUN BLD-MCNC: 47 MG/DL (ref 8–23)
BUN/CREAT SERPL: 18.1 (ref 7–25)
CALCIUM SPEC-SCNC: 9.8 MG/DL (ref 8.6–10.5)
CHLORIDE SERPL-SCNC: 100 MMOL/L (ref 98–107)
CHOLEST SERPL-MCNC: 146 MG/DL (ref 0–200)
CO2 SERPL-SCNC: 34.9 MMOL/L (ref 22–29)
COHGB MFR BLD: 2.1 % (ref 0–2)
COHGB MFR BLD: 2.2 % (ref 0–2)
CREAT BLD-MCNC: 2.59 MG/DL (ref 0.76–1.27)
DEPRECATED RDW RBC AUTO: 44.3 FL (ref 37–54)
EOSINOPHIL # BLD AUTO: 0.14 10*3/MM3 (ref 0–0.4)
EOSINOPHIL NFR BLD AUTO: 1.4 % (ref 0.3–6.2)
ERYTHROCYTE [DISTWIDTH] IN BLOOD BY AUTOMATED COUNT: 12.8 % (ref 12.3–15.4)
FOLATE SERPL-MCNC: >20 NG/ML (ref 4.78–24.2)
GAS FLOW AIRWAY: 3 LPM
GFR SERPL CREATININE-BSD FRML MDRD: 25 ML/MIN/1.73
GLOBULIN UR ELPH-MCNC: 2.4 GM/DL
GLUCOSE BLD-MCNC: 107 MG/DL (ref 65–99)
GLUCOSE BLDC GLUCOMTR-MCNC: 113 MG/DL (ref 70–130)
GLUCOSE BLDC GLUCOMTR-MCNC: 119 MG/DL (ref 70–130)
GLUCOSE BLDC GLUCOMTR-MCNC: 120 MG/DL (ref 70–130)
GLUCOSE BLDC GLUCOMTR-MCNC: 135 MG/DL (ref 70–130)
GLUCOSE BLDC GLUCOMTR-MCNC: 155 MG/DL (ref 70–130)
HBA1C MFR BLD: 5.5 % (ref 4.8–5.6)
HCO3 BLDA-SCNC: 38.1 MMOL/L (ref 22–28)
HCO3 BLDA-SCNC: 39.1 MMOL/L (ref 22–28)
HCT VFR BLD AUTO: 32.6 % (ref 37.5–51)
HCT VFR BLD CALC: 29.8 %
HCT VFR BLD CALC: 32.1 %
HDLC SERPL-MCNC: 38 MG/DL (ref 40–60)
HGB BLD-MCNC: 10.1 G/DL (ref 13–17.7)
HGB BLDA-MCNC: 10.5 G/DL (ref 12–18)
HGB BLDA-MCNC: 9.7 G/DL (ref 12–18)
HOROWITZ INDEX BLD+IHG-RTO: 32 %
HOROWITZ INDEX BLD+IHG-RTO: 35 %
IMM GRANULOCYTES # BLD AUTO: 0.03 10*3/MM3 (ref 0–0.05)
IMM GRANULOCYTES NFR BLD AUTO: 0.3 % (ref 0–0.5)
LDLC SERPL CALC-MCNC: 63 MG/DL (ref 0–100)
LDLC/HDLC SERPL: 1.66 {RATIO}
LYMPHOCYTES # BLD AUTO: 1.1 10*3/MM3 (ref 0.7–3.1)
LYMPHOCYTES NFR BLD AUTO: 10.9 % (ref 19.6–45.3)
MAGNESIUM SERPL-MCNC: 2 MG/DL (ref 1.6–2.4)
MCH RBC QN AUTO: 29.8 PG (ref 26.6–33)
MCHC RBC AUTO-ENTMCNC: 31 G/DL (ref 31.5–35.7)
MCV RBC AUTO: 96.2 FL (ref 79–97)
METHGB BLD QL: 0.2 % (ref 0–1.5)
METHGB BLD QL: 0.3 % (ref 0–1.5)
MODALITY: ABNORMAL
MODALITY: ABNORMAL
MONOCYTES # BLD AUTO: 0.78 10*3/MM3 (ref 0.1–0.9)
MONOCYTES NFR BLD AUTO: 7.8 % (ref 5–12)
NEUTROPHILS # BLD AUTO: 7.98 10*3/MM3 (ref 1.7–7)
NEUTROPHILS NFR BLD AUTO: 79.4 % (ref 42.7–76)
NOTE: ABNORMAL
NOTE: ABNORMAL
NRBC BLD AUTO-RTO: 0 /100 WBC (ref 0–0.2)
OXYHGB MFR BLDV: 93.3 % (ref 94–99)
OXYHGB MFR BLDV: 95.1 % (ref 94–99)
PAW @ PEAK INSP FLOW SETTING VENT: 12 CMH2O
PCO2 BLDA: 55.5 MM HG (ref 35–45)
PCO2 BLDA: 62.5 MM HG (ref 35–45)
PCO2 TEMP ADJ BLD: ABNORMAL MM[HG]
PCO2 TEMP ADJ BLD: ABNORMAL MM[HG]
PEEP RESPIRATORY: 5 CM[H2O]
PH BLDA: 7.41 PH UNITS (ref 7.3–7.5)
PH BLDA: 7.44 PH UNITS (ref 7.3–7.5)
PH, TEMP CORRECTED: ABNORMAL
PH, TEMP CORRECTED: ABNORMAL
PLATELET # BLD AUTO: 168 10*3/MM3 (ref 140–450)
PMV BLD AUTO: 9.4 FL (ref 6–12)
PO2 BLDA: 71.8 MM HG (ref 75–100)
PO2 BLDA: 77.1 MM HG (ref 75–100)
PO2 TEMP ADJ BLD: ABNORMAL MM[HG]
PO2 TEMP ADJ BLD: ABNORMAL MM[HG]
POTASSIUM BLD-SCNC: 4 MMOL/L (ref 3.5–5.2)
PROT SERPL-MCNC: 6 G/DL (ref 6–8.5)
RBC # BLD AUTO: 3.39 10*6/MM3 (ref 4.14–5.8)
SAO2 % BLDCOA: 95.6 % (ref 94–100)
SAO2 % BLDCOA: 97.4 % (ref 94–100)
SODIUM BLD-SCNC: 147 MMOL/L (ref 136–145)
TRIGL SERPL-MCNC: 225 MG/DL (ref 0–150)
TROPONIN T SERPL-MCNC: 0.03 NG/ML (ref 0–0.03)
VENTILATOR MODE: ABNORMAL
VENTILATOR MODE: ABNORMAL
VIT B12 BLD-MCNC: 650 PG/ML (ref 211–946)
VLDLC SERPL-MCNC: 45 MG/DL
WBC NRBC COR # BLD: 10.05 10*3/MM3 (ref 3.4–10.8)

## 2019-08-30 PROCEDURE — 82375 ASSAY CARBOXYHB QUANT: CPT

## 2019-08-30 PROCEDURE — 82746 ASSAY OF FOLIC ACID SERUM: CPT | Performed by: INTERNAL MEDICINE

## 2019-08-30 PROCEDURE — 94799 UNLISTED PULMONARY SVC/PX: CPT

## 2019-08-30 PROCEDURE — 80061 LIPID PANEL: CPT | Performed by: INTERNAL MEDICINE

## 2019-08-30 PROCEDURE — 80053 COMPREHEN METABOLIC PANEL: CPT | Performed by: INTERNAL MEDICINE

## 2019-08-30 PROCEDURE — 25010000002 CEFTRIAXONE SODIUM-DEXTROSE 1-3.74 GM-%(50ML) RECONSTITUTED SOLUTION: Performed by: INTERNAL MEDICINE

## 2019-08-30 PROCEDURE — 25010000002 ENOXAPARIN PER 10 MG: Performed by: INTERNAL MEDICINE

## 2019-08-30 PROCEDURE — 83735 ASSAY OF MAGNESIUM: CPT | Performed by: INTERNAL MEDICINE

## 2019-08-30 PROCEDURE — 83050 HGB METHEMOGLOBIN QUAN: CPT

## 2019-08-30 PROCEDURE — 83036 HEMOGLOBIN GLYCOSYLATED A1C: CPT | Performed by: INTERNAL MEDICINE

## 2019-08-30 PROCEDURE — 36600 WITHDRAWAL OF ARTERIAL BLOOD: CPT

## 2019-08-30 PROCEDURE — 84484 ASSAY OF TROPONIN QUANT: CPT | Performed by: FAMILY MEDICINE

## 2019-08-30 PROCEDURE — 99233 SBSQ HOSP IP/OBS HIGH 50: CPT | Performed by: INTERNAL MEDICINE

## 2019-08-30 PROCEDURE — 70450 CT HEAD/BRAIN W/O DYE: CPT

## 2019-08-30 PROCEDURE — 82962 GLUCOSE BLOOD TEST: CPT

## 2019-08-30 PROCEDURE — 85025 COMPLETE CBC W/AUTO DIFF WBC: CPT | Performed by: INTERNAL MEDICINE

## 2019-08-30 PROCEDURE — 82140 ASSAY OF AMMONIA: CPT | Performed by: INTERNAL MEDICINE

## 2019-08-30 PROCEDURE — 93005 ELECTROCARDIOGRAM TRACING: CPT | Performed by: INTERNAL MEDICINE

## 2019-08-30 PROCEDURE — 82607 VITAMIN B-12: CPT | Performed by: INTERNAL MEDICINE

## 2019-08-30 PROCEDURE — 94660 CPAP INITIATION&MGMT: CPT

## 2019-08-30 PROCEDURE — 82805 BLOOD GASES W/O2 SATURATION: CPT

## 2019-08-30 PROCEDURE — 63710000001 INSULIN ASPART PER 5 UNITS: Performed by: INTERNAL MEDICINE

## 2019-08-30 RX ORDER — SODIUM CHLORIDE 450 MG/100ML
100 INJECTION, SOLUTION INTRAVENOUS CONTINUOUS
Status: DISCONTINUED | OUTPATIENT
Start: 2019-08-30 | End: 2019-08-30

## 2019-08-30 RX ORDER — DEXTROSE MONOHYDRATE 50 MG/ML
125 INJECTION, SOLUTION INTRAVENOUS CONTINUOUS
Status: DISCONTINUED | OUTPATIENT
Start: 2019-08-30 | End: 2019-08-31 | Stop reason: HOSPADM

## 2019-08-30 RX ORDER — AMINO ACIDS/PROTEIN HYDROLYS 15G-100/30
30 LIQUID (ML) ORAL 2 TIMES DAILY
Status: DISCONTINUED | OUTPATIENT
Start: 2019-08-30 | End: 2019-08-31 | Stop reason: HOSPADM

## 2019-08-30 RX ADMIN — ENOXAPARIN SODIUM 30 MG: 30 INJECTION SUBCUTANEOUS at 15:36

## 2019-08-30 RX ADMIN — INSULIN ASPART 2 UNITS: 100 INJECTION, SOLUTION INTRAVENOUS; SUBCUTANEOUS at 18:45

## 2019-08-30 RX ADMIN — BUDESONIDE 0.5 MG: 0.5 INHALANT RESPIRATORY (INHALATION) at 18:35

## 2019-08-30 RX ADMIN — DEXTROSE MONOHYDRATE 125 ML/HR: 50 INJECTION, SOLUTION INTRAVENOUS at 21:53

## 2019-08-30 RX ADMIN — IPRATROPIUM BROMIDE AND ALBUTEROL SULFATE 3 ML: .5; 3 SOLUTION RESPIRATORY (INHALATION) at 00:17

## 2019-08-30 RX ADMIN — CEFTRIAXONE 1 G: 1 INJECTION, SOLUTION INTRAVENOUS at 13:00

## 2019-08-30 RX ADMIN — IPRATROPIUM BROMIDE AND ALBUTEROL SULFATE 3 ML: .5; 3 SOLUTION RESPIRATORY (INHALATION) at 12:26

## 2019-08-30 RX ADMIN — IPRATROPIUM BROMIDE AND ALBUTEROL SULFATE 3 ML: .5; 3 SOLUTION RESPIRATORY (INHALATION) at 18:35

## 2019-08-30 RX ADMIN — IPRATROPIUM BROMIDE AND ALBUTEROL SULFATE 3 ML: .5; 3 SOLUTION RESPIRATORY (INHALATION) at 06:46

## 2019-08-30 RX ADMIN — BUDESONIDE 0.5 MG: 0.5 INHALANT RESPIRATORY (INHALATION) at 06:46

## 2019-08-30 RX ADMIN — MUPIROCIN 1 APPLICATION: 20 OINTMENT TOPICAL at 15:49

## 2019-08-30 RX ADMIN — SODIUM CHLORIDE, POTASSIUM CHLORIDE, SODIUM LACTATE AND CALCIUM CHLORIDE 75 ML/HR: 600; 310; 30; 20 INJECTION, SOLUTION INTRAVENOUS at 02:45

## 2019-08-30 RX ADMIN — DEXTROSE MONOHYDRATE 125 ML/HR: 50 INJECTION, SOLUTION INTRAVENOUS at 12:21

## 2019-08-30 RX ADMIN — SODIUM CHLORIDE 100 ML/HR: 4.5 INJECTION, SOLUTION INTRAVENOUS at 09:55

## 2019-08-30 RX ADMIN — MUPIROCIN: 20 OINTMENT TOPICAL at 21:53

## 2019-08-30 RX ADMIN — SODIUM CHLORIDE, PRESERVATIVE FREE 10 ML: 5 INJECTION INTRAVENOUS at 09:59

## 2019-08-31 ENCOUNTER — HOSPITAL ENCOUNTER (INPATIENT)
Facility: HOSPITAL | Age: 69
LOS: 26 days | Discharge: NURSING, REHAB OR LONG-TERM CARE | End: 2019-09-26
Attending: INTERNAL MEDICINE | Admitting: INTERNAL MEDICINE

## 2019-08-31 ENCOUNTER — APPOINTMENT (OUTPATIENT)
Dept: GENERAL RADIOLOGY | Facility: HOSPITAL | Age: 69
End: 2019-08-31

## 2019-08-31 VITALS
HEART RATE: 109 BPM | RESPIRATION RATE: 24 BRPM | HEIGHT: 67 IN | WEIGHT: 172.1 LBS | OXYGEN SATURATION: 96 % | SYSTOLIC BLOOD PRESSURE: 134 MMHG | TEMPERATURE: 99.1 F | DIASTOLIC BLOOD PRESSURE: 59 MMHG | BODY MASS INDEX: 27.01 KG/M2

## 2019-08-31 DIAGNOSIS — Z78.9 IMPAIRED MOBILITY AND ADLS: ICD-10-CM

## 2019-08-31 DIAGNOSIS — Z74.09 IMPAIRED MOBILITY AND ADLS: ICD-10-CM

## 2019-08-31 DIAGNOSIS — G93.40 ACUTE ENCEPHALOPATHY: Primary | ICD-10-CM

## 2019-08-31 DIAGNOSIS — R13.12 OROPHARYNGEAL DYSPHAGIA: ICD-10-CM

## 2019-08-31 PROBLEM — N17.9 AKI (ACUTE KIDNEY INJURY) (HCC): Status: ACTIVE | Noted: 2019-08-31

## 2019-08-31 PROBLEM — N39.0 COMPLICATED UTI (URINARY TRACT INFECTION): Status: ACTIVE | Noted: 2019-08-31

## 2019-08-31 LAB
A-A DO2: ABNORMAL
ALBUMIN SERPL-MCNC: 3.5 G/DL (ref 3.5–5.2)
ALBUMIN/GLOB SERPL: 1.3 G/DL
ALP SERPL-CCNC: 74 U/L (ref 39–117)
ALT SERPL W P-5'-P-CCNC: 8 U/L (ref 1–41)
ANION GAP SERPL CALCULATED.3IONS-SCNC: 11.1 MMOL/L (ref 5–15)
ARTERIAL PATENCY WRIST A: ABNORMAL
AST SERPL-CCNC: 13 U/L (ref 1–40)
ATMOSPHERIC PRESS: 738 MMHG
BACTERIA SPEC AEROBE CULT: ABNORMAL
BASE EXCESS BLDA CALC-SCNC: 12.5 MMOL/L (ref 0–2)
BASOPHILS # BLD AUTO: 0.05 10*3/MM3 (ref 0–0.2)
BASOPHILS NFR BLD AUTO: 0.5 % (ref 0–1.5)
BDY SITE: ABNORMAL
BILIRUB SERPL-MCNC: 0.7 MG/DL (ref 0.2–1.2)
BUN BLD-MCNC: 39 MG/DL (ref 8–23)
BUN/CREAT SERPL: 16.3 (ref 7–25)
CALCIUM SPEC-SCNC: 8.9 MG/DL (ref 8.6–10.5)
CHLORIDE SERPL-SCNC: 94 MMOL/L (ref 98–107)
CO2 SERPL-SCNC: 33.9 MMOL/L (ref 22–29)
COHGB MFR BLD: 1.9 % (ref 0–2)
CREAT BLD-MCNC: 2.39 MG/DL (ref 0.76–1.27)
DEPRECATED RDW RBC AUTO: 44.2 FL (ref 37–54)
EOSINOPHIL # BLD AUTO: 0.23 10*3/MM3 (ref 0–0.4)
EOSINOPHIL NFR BLD AUTO: 2.3 % (ref 0.3–6.2)
ERYTHROCYTE [DISTWIDTH] IN BLOOD BY AUTOMATED COUNT: 12.8 % (ref 12.3–15.4)
GFR SERPL CREATININE-BSD FRML MDRD: 27 ML/MIN/1.73
GLOBULIN UR ELPH-MCNC: 2.6 GM/DL
GLUCOSE BLD-MCNC: 154 MG/DL (ref 65–99)
GLUCOSE BLDC GLUCOMTR-MCNC: 153 MG/DL (ref 70–130)
GLUCOSE BLDC GLUCOMTR-MCNC: 176 MG/DL (ref 70–130)
GLUCOSE BLDC GLUCOMTR-MCNC: 189 MG/DL (ref 70–130)
GLUCOSE BLDC GLUCOMTR-MCNC: 202 MG/DL (ref 70–130)
HCO3 BLDA-SCNC: 38.4 MMOL/L (ref 22–28)
HCT VFR BLD AUTO: 28.6 % (ref 37.5–51)
HCT VFR BLD CALC: 28.2 %
HGB BLD-MCNC: 9.1 G/DL (ref 13–17.7)
HGB BLDA-MCNC: 9.2 G/DL (ref 12–18)
HOROWITZ INDEX BLD+IHG-RTO: 30 %
IMM GRANULOCYTES # BLD AUTO: 0.04 10*3/MM3 (ref 0–0.05)
IMM GRANULOCYTES NFR BLD AUTO: 0.4 % (ref 0–0.5)
LYMPHOCYTES # BLD AUTO: 1.23 10*3/MM3 (ref 0.7–3.1)
LYMPHOCYTES NFR BLD AUTO: 12.3 % (ref 19.6–45.3)
MAGNESIUM SERPL-MCNC: 2.1 MG/DL (ref 1.6–2.4)
MCH RBC QN AUTO: 29.9 PG (ref 26.6–33)
MCHC RBC AUTO-ENTMCNC: 31.8 G/DL (ref 31.5–35.7)
MCV RBC AUTO: 94.1 FL (ref 79–97)
METHGB BLD QL: 0.4 % (ref 0–1.5)
MODALITY: ABNORMAL
MONOCYTES # BLD AUTO: 1.12 10*3/MM3 (ref 0.1–0.9)
MONOCYTES NFR BLD AUTO: 11.2 % (ref 5–12)
NEUTROPHILS # BLD AUTO: 7.3 10*3/MM3 (ref 1.7–7)
NEUTROPHILS NFR BLD AUTO: 73.3 % (ref 42.7–76)
NOTE: ABNORMAL
NRBC BLD AUTO-RTO: 0 /100 WBC (ref 0–0.2)
OXYHGB MFR BLDV: 94.3 % (ref 94–99)
PAW @ PEAK INSP FLOW SETTING VENT: 12 CMH2O
PCO2 BLDA: 57.1 MM HG (ref 35–45)
PCO2 TEMP ADJ BLD: ABNORMAL MM[HG]
PEEP RESPIRATORY: 5 CM[H2O]
PH BLDA: 7.44 PH UNITS (ref 7.3–7.5)
PH, TEMP CORRECTED: ABNORMAL
PLATELET # BLD AUTO: 171 10*3/MM3 (ref 140–450)
PMV BLD AUTO: 9.4 FL (ref 6–12)
PO2 BLDA: 72.9 MM HG (ref 75–100)
PO2 TEMP ADJ BLD: ABNORMAL MM[HG]
POTASSIUM BLD-SCNC: 3.6 MMOL/L (ref 3.5–5.2)
PROT SERPL-MCNC: 6.1 G/DL (ref 6–8.5)
RBC # BLD AUTO: 3.04 10*6/MM3 (ref 4.14–5.8)
SAO2 % BLDCOA: 96.5 % (ref 94–100)
SODIUM BLD-SCNC: 139 MMOL/L (ref 136–145)
TSH SERPL DL<=0.05 MIU/L-ACNC: 1.26 UIU/ML (ref 0.27–4.2)
VENTILATOR MODE: ABNORMAL
WBC NRBC COR # BLD: 9.97 10*3/MM3 (ref 3.4–10.8)

## 2019-08-31 PROCEDURE — 83735 ASSAY OF MAGNESIUM: CPT | Performed by: INTERNAL MEDICINE

## 2019-08-31 PROCEDURE — 94799 UNLISTED PULMONARY SVC/PX: CPT

## 2019-08-31 PROCEDURE — 63710000001 INSULIN LISPRO (HUMAN) PER 5 UNITS: Performed by: FAMILY MEDICINE

## 2019-08-31 PROCEDURE — 99498 ADVNCD CARE PLAN ADDL 30 MIN: CPT | Performed by: INTERNAL MEDICINE

## 2019-08-31 PROCEDURE — 25010000002 THIAMINE PER 100 MG: Performed by: INTERNAL MEDICINE

## 2019-08-31 PROCEDURE — 25010000003 LEVETIRACETAM IN NACL 0.75% 1000 MG/100ML SOLUTION: Performed by: PSYCHIATRY & NEUROLOGY

## 2019-08-31 PROCEDURE — 80053 COMPREHEN METABOLIC PANEL: CPT | Performed by: INTERNAL MEDICINE

## 2019-08-31 PROCEDURE — 82962 GLUCOSE BLOOD TEST: CPT

## 2019-08-31 PROCEDURE — 94640 AIRWAY INHALATION TREATMENT: CPT

## 2019-08-31 PROCEDURE — 36600 WITHDRAWAL OF ARTERIAL BLOOD: CPT

## 2019-08-31 PROCEDURE — 99497 ADVNCD CARE PLAN 30 MIN: CPT | Performed by: INTERNAL MEDICINE

## 2019-08-31 PROCEDURE — 25010000002 METHYLPREDNISOLONE PER 40 MG: Performed by: FAMILY MEDICINE

## 2019-08-31 PROCEDURE — 83050 HGB METHEMOGLOBIN QUAN: CPT

## 2019-08-31 PROCEDURE — 25010000002 HYDRALAZINE PER 20 MG: Performed by: INTERNAL MEDICINE

## 2019-08-31 PROCEDURE — 93010 ELECTROCARDIOGRAM REPORT: CPT | Performed by: INTERNAL MEDICINE

## 2019-08-31 PROCEDURE — 99223 1ST HOSP IP/OBS HIGH 75: CPT | Performed by: FAMILY MEDICINE

## 2019-08-31 PROCEDURE — 25010000002 CEFTRIAXONE SODIUM-DEXTROSE 1-3.74 GM-%(50ML) RECONSTITUTED SOLUTION: Performed by: INTERNAL MEDICINE

## 2019-08-31 PROCEDURE — 25010000002 METHYLPREDNISOLONE PER 40 MG: Performed by: INTERNAL MEDICINE

## 2019-08-31 PROCEDURE — 99238 HOSP IP/OBS DSCHRG MGMT 30/<: CPT | Performed by: INTERNAL MEDICINE

## 2019-08-31 PROCEDURE — 97162 PT EVAL MOD COMPLEX 30 MIN: CPT

## 2019-08-31 PROCEDURE — 85025 COMPLETE CBC W/AUTO DIFF WBC: CPT | Performed by: INTERNAL MEDICINE

## 2019-08-31 PROCEDURE — 84443 ASSAY THYROID STIM HORMONE: CPT | Performed by: INTERNAL MEDICINE

## 2019-08-31 PROCEDURE — 93005 ELECTROCARDIOGRAM TRACING: CPT | Performed by: FAMILY MEDICINE

## 2019-08-31 PROCEDURE — 82375 ASSAY CARBOXYHB QUANT: CPT

## 2019-08-31 PROCEDURE — 94660 CPAP INITIATION&MGMT: CPT

## 2019-08-31 PROCEDURE — 71045 X-RAY EXAM CHEST 1 VIEW: CPT

## 2019-08-31 PROCEDURE — 25010000002 HEPARIN (PORCINE) PER 1000 UNITS: Performed by: FAMILY MEDICINE

## 2019-08-31 PROCEDURE — 99221 1ST HOSP IP/OBS SF/LOW 40: CPT | Performed by: PSYCHIATRY & NEUROLOGY

## 2019-08-31 PROCEDURE — 82805 BLOOD GASES W/O2 SATURATION: CPT

## 2019-08-31 RX ORDER — ONDANSETRON 2 MG/ML
4 INJECTION INTRAMUSCULAR; INTRAVENOUS EVERY 6 HOURS PRN
Status: DISCONTINUED | OUTPATIENT
Start: 2019-08-31 | End: 2019-09-10

## 2019-08-31 RX ORDER — SODIUM CHLORIDE 0.9 % (FLUSH) 0.9 %
10 SYRINGE (ML) INJECTION AS NEEDED
Status: DISCONTINUED | OUTPATIENT
Start: 2019-08-31 | End: 2019-09-26 | Stop reason: HOSPADM

## 2019-08-31 RX ORDER — DEXTROSE MONOHYDRATE 25 G/50ML
25 INJECTION, SOLUTION INTRAVENOUS
Status: DISCONTINUED | OUTPATIENT
Start: 2019-08-31 | End: 2019-09-03

## 2019-08-31 RX ORDER — IPRATROPIUM BROMIDE AND ALBUTEROL SULFATE 2.5; .5 MG/3ML; MG/3ML
3 SOLUTION RESPIRATORY (INHALATION)
Status: DISCONTINUED | OUTPATIENT
Start: 2019-08-31 | End: 2019-09-26 | Stop reason: HOSPADM

## 2019-08-31 RX ORDER — LEVETIRACETAM 10 MG/ML
1000 INJECTION INTRAVASCULAR ONCE
Status: COMPLETED | OUTPATIENT
Start: 2019-08-31 | End: 2019-08-31

## 2019-08-31 RX ORDER — IPRATROPIUM BROMIDE AND ALBUTEROL SULFATE 2.5; .5 MG/3ML; MG/3ML
3 SOLUTION RESPIRATORY (INHALATION) EVERY 4 HOURS PRN
Status: DISCONTINUED | OUTPATIENT
Start: 2019-08-31 | End: 2019-08-31 | Stop reason: HOSPADM

## 2019-08-31 RX ORDER — NICOTINE POLACRILEX 4 MG
15 LOZENGE BUCCAL
Status: DISCONTINUED | OUTPATIENT
Start: 2019-08-31 | End: 2019-09-03

## 2019-08-31 RX ORDER — POLYVINYL ALCOHOL 14 MG/ML
1 SOLUTION/ DROPS OPHTHALMIC
Status: DISCONTINUED | OUTPATIENT
Start: 2019-08-31 | End: 2019-09-26 | Stop reason: HOSPADM

## 2019-08-31 RX ORDER — LEVETIRACETAM 5 MG/ML
500 INJECTION INTRAVASCULAR EVERY 12 HOURS
Status: DISCONTINUED | OUTPATIENT
Start: 2019-09-01 | End: 2019-09-02

## 2019-08-31 RX ORDER — BUDESONIDE 0.5 MG/2ML
0.5 INHALANT ORAL
Status: DISCONTINUED | OUTPATIENT
Start: 2019-08-31 | End: 2019-09-26 | Stop reason: HOSPADM

## 2019-08-31 RX ORDER — METHYLPREDNISOLONE SODIUM SUCCINATE 40 MG/ML
40 INJECTION, POWDER, LYOPHILIZED, FOR SOLUTION INTRAMUSCULAR; INTRAVENOUS EVERY 12 HOURS
Status: DISCONTINUED | OUTPATIENT
Start: 2019-08-31 | End: 2019-08-31 | Stop reason: HOSPADM

## 2019-08-31 RX ORDER — CEFTRIAXONE 1 G/50ML
1 INJECTION, SOLUTION INTRAVENOUS EVERY 24 HOURS
Qty: 200 ML | Refills: 0
Start: 2019-09-01 | End: 2019-09-26 | Stop reason: HOSPADM

## 2019-08-31 RX ORDER — HYDRALAZINE HYDROCHLORIDE 20 MG/ML
20 INJECTION INTRAMUSCULAR; INTRAVENOUS EVERY 6 HOURS PRN
Start: 2019-08-31 | End: 2019-09-26 | Stop reason: HOSPADM

## 2019-08-31 RX ORDER — SODIUM CHLORIDE 0.9 % (FLUSH) 0.9 %
10 SYRINGE (ML) INJECTION EVERY 12 HOURS SCHEDULED
Status: DISCONTINUED | OUTPATIENT
Start: 2019-08-31 | End: 2019-09-10

## 2019-08-31 RX ORDER — ACETAMINOPHEN 160 MG/5ML
650 SOLUTION ORAL EVERY 4 HOURS PRN
Status: DISCONTINUED | OUTPATIENT
Start: 2019-08-31 | End: 2019-09-26 | Stop reason: HOSPADM

## 2019-08-31 RX ORDER — ACETAMINOPHEN 325 MG/1
650 TABLET ORAL EVERY 4 HOURS PRN
Status: DISCONTINUED | OUTPATIENT
Start: 2019-08-31 | End: 2019-09-26 | Stop reason: HOSPADM

## 2019-08-31 RX ORDER — METHYLPREDNISOLONE SODIUM SUCCINATE 40 MG/ML
20 INJECTION, POWDER, LYOPHILIZED, FOR SOLUTION INTRAMUSCULAR; INTRAVENOUS EVERY 12 HOURS
Status: DISCONTINUED | OUTPATIENT
Start: 2019-08-31 | End: 2019-09-02

## 2019-08-31 RX ORDER — DEXTROSE AND SODIUM CHLORIDE 5; .45 G/100ML; G/100ML
75 INJECTION, SOLUTION INTRAVENOUS CONTINUOUS
Status: DISCONTINUED | OUTPATIENT
Start: 2019-08-31 | End: 2019-09-03

## 2019-08-31 RX ORDER — ONDANSETRON 2 MG/ML
4 INJECTION INTRAMUSCULAR; INTRAVENOUS EVERY 6 HOURS PRN
Start: 2019-08-31 | End: 2019-09-26 | Stop reason: HOSPADM

## 2019-08-31 RX ORDER — BISACODYL 10 MG
10 SUPPOSITORY, RECTAL RECTAL DAILY PRN
Status: DISCONTINUED | OUTPATIENT
Start: 2019-08-31 | End: 2019-09-26 | Stop reason: HOSPADM

## 2019-08-31 RX ORDER — HEPARIN SODIUM 5000 [USP'U]/ML
5000 INJECTION, SOLUTION INTRAVENOUS; SUBCUTANEOUS EVERY 12 HOURS SCHEDULED
Status: DISCONTINUED | OUTPATIENT
Start: 2019-08-31 | End: 2019-09-12

## 2019-08-31 RX ORDER — METHYLPREDNISOLONE SODIUM SUCCINATE 40 MG/ML
40 INJECTION, POWDER, LYOPHILIZED, FOR SOLUTION INTRAMUSCULAR; INTRAVENOUS EVERY 12 HOURS
Start: 2019-09-01 | End: 2019-09-26 | Stop reason: HOSPADM

## 2019-08-31 RX ORDER — ACETAMINOPHEN 650 MG/1
650 SUPPOSITORY RECTAL EVERY 4 HOURS PRN
Status: DISCONTINUED | OUTPATIENT
Start: 2019-08-31 | End: 2019-09-26 | Stop reason: HOSPADM

## 2019-08-31 RX ORDER — HYDRALAZINE HYDROCHLORIDE 20 MG/ML
20 INJECTION INTRAMUSCULAR; INTRAVENOUS EVERY 6 HOURS PRN
Status: DISCONTINUED | OUTPATIENT
Start: 2019-08-31 | End: 2019-08-31 | Stop reason: HOSPADM

## 2019-08-31 RX ADMIN — DEXTROSE AND SODIUM CHLORIDE 75 ML/HR: 5; 450 INJECTION, SOLUTION INTRAVENOUS at 18:22

## 2019-08-31 RX ADMIN — INSULIN LISPRO 3 UNITS: 100 INJECTION, SOLUTION INTRAVENOUS; SUBCUTANEOUS at 21:50

## 2019-08-31 RX ADMIN — CEFTRIAXONE 1 G: 1 INJECTION, SOLUTION INTRAVENOUS at 13:45

## 2019-08-31 RX ADMIN — THIAMINE HYDROCHLORIDE 100 MG: 100 INJECTION, SOLUTION INTRAMUSCULAR; INTRAVENOUS at 14:47

## 2019-08-31 RX ADMIN — METHYLPREDNISOLONE SODIUM SUCCINATE 20 MG: 40 INJECTION, POWDER, FOR SOLUTION INTRAMUSCULAR; INTRAVENOUS at 21:49

## 2019-08-31 RX ADMIN — MUPIROCIN: 20 OINTMENT TOPICAL at 07:02

## 2019-08-31 RX ADMIN — DEXTROSE MONOHYDRATE 125 ML/HR: 50 INJECTION, SOLUTION INTRAVENOUS at 06:43

## 2019-08-31 RX ADMIN — MUPIROCIN: 20 OINTMENT TOPICAL at 13:45

## 2019-08-31 RX ADMIN — HEPARIN SODIUM 5000 UNITS: 5000 INJECTION INTRAVENOUS; SUBCUTANEOUS at 21:49

## 2019-08-31 RX ADMIN — METHYLPREDNISOLONE SODIUM SUCCINATE 40 MG: 40 INJECTION, POWDER, FOR SOLUTION INTRAMUSCULAR; INTRAVENOUS at 13:45

## 2019-08-31 RX ADMIN — DEXTROSE MONOHYDRATE 125 ML/HR: 50 INJECTION, SOLUTION INTRAVENOUS at 07:02

## 2019-08-31 RX ADMIN — SODIUM CHLORIDE, PRESERVATIVE FREE 10 ML: 5 INJECTION INTRAVENOUS at 13:45

## 2019-08-31 RX ADMIN — DEXTROSE MONOHYDRATE 125 ML/HR: 50 INJECTION, SOLUTION INTRAVENOUS at 14:47

## 2019-08-31 RX ADMIN — LEVETIRACETAM INJECTION 1000 MG: 10 INJECTION INTRAVENOUS at 18:43

## 2019-08-31 RX ADMIN — IPRATROPIUM BROMIDE AND ALBUTEROL SULFATE 3 ML: .5; 3 SOLUTION RESPIRATORY (INHALATION) at 15:00

## 2019-08-31 RX ADMIN — SODIUM CHLORIDE, PRESERVATIVE FREE 10 ML: 5 INJECTION INTRAVENOUS at 09:25

## 2019-08-31 RX ADMIN — INSULIN LISPRO 2 UNITS: 100 INJECTION, SOLUTION INTRAVENOUS; SUBCUTANEOUS at 18:24

## 2019-08-31 RX ADMIN — IPRATROPIUM BROMIDE AND ALBUTEROL SULFATE 3 ML: 2.5; .5 SOLUTION RESPIRATORY (INHALATION) at 19:31

## 2019-08-31 RX ADMIN — BUDESONIDE 0.5 MG: 0.5 INHALANT RESPIRATORY (INHALATION) at 06:33

## 2019-08-31 RX ADMIN — SODIUM CHLORIDE, PRESERVATIVE FREE 10 ML: 5 INJECTION INTRAVENOUS at 21:50

## 2019-08-31 RX ADMIN — HYDRALAZINE HYDROCHLORIDE 20 MG: 20 INJECTION INTRAMUSCULAR; INTRAVENOUS at 09:25

## 2019-08-31 RX ADMIN — IPRATROPIUM BROMIDE AND ALBUTEROL SULFATE 3 ML: .5; 3 SOLUTION RESPIRATORY (INHALATION) at 12:43

## 2019-08-31 RX ADMIN — IPRATROPIUM BROMIDE AND ALBUTEROL SULFATE 3 ML: .5; 3 SOLUTION RESPIRATORY (INHALATION) at 00:30

## 2019-08-31 RX ADMIN — IPRATROPIUM BROMIDE AND ALBUTEROL SULFATE 3 ML: .5; 3 SOLUTION RESPIRATORY (INHALATION) at 06:33

## 2019-08-31 RX ADMIN — BUDESONIDE 0.5 MG: 0.5 INHALANT RESPIRATORY (INHALATION) at 19:31

## 2019-09-01 ENCOUNTER — APPOINTMENT (OUTPATIENT)
Dept: NEUROLOGY | Facility: HOSPITAL | Age: 69
End: 2019-09-01

## 2019-09-01 PROBLEM — N18.30 ACUTE RENAL FAILURE SUPERIMPOSED ON STAGE 3 CHRONIC KIDNEY DISEASE (HCC): Status: ACTIVE | Noted: 2019-08-31

## 2019-09-01 LAB
ANION GAP SERPL CALCULATED.3IONS-SCNC: 14 MMOL/L (ref 5–15)
BUN BLD-MCNC: 30 MG/DL (ref 8–23)
BUN/CREAT SERPL: 16.3 (ref 7–25)
CALCIUM SPEC-SCNC: 9 MG/DL (ref 8.6–10.5)
CHLORIDE SERPL-SCNC: 94 MMOL/L (ref 98–107)
CO2 SERPL-SCNC: 30 MMOL/L (ref 22–29)
CREAT BLD-MCNC: 1.84 MG/DL (ref 0.76–1.27)
CREAT UR-MCNC: 90.1 MG/DL
DEPRECATED RDW RBC AUTO: 46 FL (ref 37–54)
ERYTHROCYTE [DISTWIDTH] IN BLOOD BY AUTOMATED COUNT: 13.2 % (ref 12.3–15.4)
FERRITIN SERPL-MCNC: 156.5 NG/ML (ref 30–400)
FOLATE SERPL-MCNC: 13.7 NG/ML (ref 4.78–24.2)
GFR SERPL CREATININE-BSD FRML MDRD: 37 ML/MIN/1.73
GLUCOSE BLD-MCNC: 169 MG/DL (ref 65–99)
GLUCOSE BLDC GLUCOMTR-MCNC: 139 MG/DL (ref 70–130)
GLUCOSE BLDC GLUCOMTR-MCNC: 145 MG/DL (ref 70–130)
GLUCOSE BLDC GLUCOMTR-MCNC: 157 MG/DL (ref 70–130)
GLUCOSE BLDC GLUCOMTR-MCNC: 158 MG/DL (ref 70–130)
HCT VFR BLD AUTO: 30 % (ref 37.5–51)
HGB BLD-MCNC: 9.4 G/DL (ref 13–17.7)
IRON 24H UR-MRATE: 42 MCG/DL (ref 59–158)
IRON SATN MFR SERPL: 16 % (ref 20–50)
MAGNESIUM SERPL-MCNC: 2.4 MG/DL (ref 1.6–2.4)
MCH RBC QN AUTO: 29.9 PG (ref 26.6–33)
MCHC RBC AUTO-ENTMCNC: 31.3 G/DL (ref 31.5–35.7)
MCV RBC AUTO: 95.5 FL (ref 79–97)
PLATELET # BLD AUTO: 193 10*3/MM3 (ref 140–450)
PMV BLD AUTO: 9.5 FL (ref 6–12)
POTASSIUM BLD-SCNC: 4 MMOL/L (ref 3.5–5.2)
PROT UR-MCNC: 29.6 MG/DL
RBC # BLD AUTO: 3.14 10*6/MM3 (ref 4.14–5.8)
SODIUM BLD-SCNC: 138 MMOL/L (ref 136–145)
SODIUM UR-SCNC: 50 MMOL/L
TIBC SERPL-MCNC: 267 MCG/DL (ref 298–536)
TRANSFERRIN SERPL-MCNC: 179 MG/DL (ref 200–360)
VIT B12 BLD-MCNC: 626 PG/ML (ref 211–946)
WBC NRBC COR # BLD: 9.99 10*3/MM3 (ref 3.4–10.8)

## 2019-09-01 PROCEDURE — 83735 ASSAY OF MAGNESIUM: CPT | Performed by: FAMILY MEDICINE

## 2019-09-01 PROCEDURE — 93005 ELECTROCARDIOGRAM TRACING: CPT | Performed by: INTERNAL MEDICINE

## 2019-09-01 PROCEDURE — 99233 SBSQ HOSP IP/OBS HIGH 50: CPT | Performed by: INTERNAL MEDICINE

## 2019-09-01 PROCEDURE — 83540 ASSAY OF IRON: CPT | Performed by: FAMILY MEDICINE

## 2019-09-01 PROCEDURE — 25010000002 CEFTRIAXONE PER 250 MG: Performed by: FAMILY MEDICINE

## 2019-09-01 PROCEDURE — 80048 BASIC METABOLIC PNL TOTAL CA: CPT | Performed by: FAMILY MEDICINE

## 2019-09-01 PROCEDURE — 85027 COMPLETE CBC AUTOMATED: CPT | Performed by: FAMILY MEDICINE

## 2019-09-01 PROCEDURE — 94799 UNLISTED PULMONARY SVC/PX: CPT

## 2019-09-01 PROCEDURE — 82728 ASSAY OF FERRITIN: CPT | Performed by: FAMILY MEDICINE

## 2019-09-01 PROCEDURE — 82746 ASSAY OF FOLIC ACID SERUM: CPT | Performed by: FAMILY MEDICINE

## 2019-09-01 PROCEDURE — 25010000002 LEVETIRACETAM IN NACL 0.82% 500 MG/100ML SOLUTION: Performed by: PSYCHIATRY & NEUROLOGY

## 2019-09-01 PROCEDURE — 93010 ELECTROCARDIOGRAM REPORT: CPT | Performed by: INTERNAL MEDICINE

## 2019-09-01 PROCEDURE — 25010000002 HEPARIN (PORCINE) PER 1000 UNITS: Performed by: FAMILY MEDICINE

## 2019-09-01 PROCEDURE — 82570 ASSAY OF URINE CREATININE: CPT | Performed by: INTERNAL MEDICINE

## 2019-09-01 PROCEDURE — 84300 ASSAY OF URINE SODIUM: CPT | Performed by: INTERNAL MEDICINE

## 2019-09-01 PROCEDURE — 82962 GLUCOSE BLOOD TEST: CPT

## 2019-09-01 PROCEDURE — 84156 ASSAY OF PROTEIN URINE: CPT | Performed by: INTERNAL MEDICINE

## 2019-09-01 PROCEDURE — 92610 EVALUATE SWALLOWING FUNCTION: CPT

## 2019-09-01 PROCEDURE — 25010000002 METHYLPREDNISOLONE PER 40 MG: Performed by: FAMILY MEDICINE

## 2019-09-01 PROCEDURE — 95819 EEG AWAKE AND ASLEEP: CPT

## 2019-09-01 PROCEDURE — 82607 VITAMIN B-12: CPT | Performed by: FAMILY MEDICINE

## 2019-09-01 PROCEDURE — 84466 ASSAY OF TRANSFERRIN: CPT | Performed by: FAMILY MEDICINE

## 2019-09-01 PROCEDURE — 99232 SBSQ HOSP IP/OBS MODERATE 35: CPT | Performed by: PSYCHIATRY & NEUROLOGY

## 2019-09-01 RX ADMIN — CEFTRIAXONE 1 G: 1 INJECTION, POWDER, FOR SOLUTION INTRAMUSCULAR; INTRAVENOUS at 12:31

## 2019-09-01 RX ADMIN — IPRATROPIUM BROMIDE AND ALBUTEROL SULFATE 3 ML: 2.5; .5 SOLUTION RESPIRATORY (INHALATION) at 00:05

## 2019-09-01 RX ADMIN — METHYLPREDNISOLONE SODIUM SUCCINATE 20 MG: 40 INJECTION, POWDER, FOR SOLUTION INTRAMUSCULAR; INTRAVENOUS at 08:04

## 2019-09-01 RX ADMIN — INSULIN LISPRO 2 UNITS: 100 INJECTION, SOLUTION INTRAVENOUS; SUBCUTANEOUS at 12:30

## 2019-09-01 RX ADMIN — LEVETIRACETAM 500 MG: 5 INJECTION INTRAVENOUS at 05:29

## 2019-09-01 RX ADMIN — BUDESONIDE 0.5 MG: 0.5 INHALANT RESPIRATORY (INHALATION) at 08:02

## 2019-09-01 RX ADMIN — POLYVINYL ALCOHOL 1 DROP: 14 SOLUTION/ DROPS OPHTHALMIC at 12:31

## 2019-09-01 RX ADMIN — DEXTROSE AND SODIUM CHLORIDE 75 ML/HR: 5; 450 INJECTION, SOLUTION INTRAVENOUS at 17:20

## 2019-09-01 RX ADMIN — METHYLPREDNISOLONE SODIUM SUCCINATE 20 MG: 40 INJECTION, POWDER, FOR SOLUTION INTRAMUSCULAR; INTRAVENOUS at 21:36

## 2019-09-01 RX ADMIN — LEVETIRACETAM 500 MG: 5 INJECTION INTRAVENOUS at 17:20

## 2019-09-01 RX ADMIN — HEPARIN SODIUM 5000 UNITS: 5000 INJECTION INTRAVENOUS; SUBCUTANEOUS at 08:05

## 2019-09-01 RX ADMIN — DEXTROSE AND SODIUM CHLORIDE 75 ML/HR: 5; 450 INJECTION, SOLUTION INTRAVENOUS at 04:40

## 2019-09-01 RX ADMIN — IPRATROPIUM BROMIDE AND ALBUTEROL SULFATE 3 ML: 2.5; .5 SOLUTION RESPIRATORY (INHALATION) at 13:24

## 2019-09-01 RX ADMIN — SODIUM CHLORIDE, PRESERVATIVE FREE 10 ML: 5 INJECTION INTRAVENOUS at 08:07

## 2019-09-01 RX ADMIN — IPRATROPIUM BROMIDE AND ALBUTEROL SULFATE 3 ML: 2.5; .5 SOLUTION RESPIRATORY (INHALATION) at 08:03

## 2019-09-01 RX ADMIN — INSULIN LISPRO 2 UNITS: 100 INJECTION, SOLUTION INTRAVENOUS; SUBCUTANEOUS at 08:06

## 2019-09-02 ENCOUNTER — APPOINTMENT (OUTPATIENT)
Dept: CT IMAGING | Facility: HOSPITAL | Age: 69
End: 2019-09-02

## 2019-09-02 PROBLEM — T50.902A: Status: ACTIVE | Noted: 2019-09-02

## 2019-09-02 LAB
GLUCOSE BLDC GLUCOMTR-MCNC: 102 MG/DL (ref 70–130)
GLUCOSE BLDC GLUCOMTR-MCNC: 127 MG/DL (ref 70–130)
GLUCOSE BLDC GLUCOMTR-MCNC: 134 MG/DL (ref 70–130)
GLUCOSE BLDC GLUCOMTR-MCNC: 142 MG/DL (ref 70–130)

## 2019-09-02 PROCEDURE — 82962 GLUCOSE BLOOD TEST: CPT

## 2019-09-02 PROCEDURE — 25010000002 CEFTRIAXONE PER 250 MG: Performed by: FAMILY MEDICINE

## 2019-09-02 PROCEDURE — 97165 OT EVAL LOW COMPLEX 30 MIN: CPT

## 2019-09-02 PROCEDURE — 92610 EVALUATE SWALLOWING FUNCTION: CPT

## 2019-09-02 PROCEDURE — 94799 UNLISTED PULMONARY SVC/PX: CPT

## 2019-09-02 PROCEDURE — 99233 SBSQ HOSP IP/OBS HIGH 50: CPT | Performed by: INTERNAL MEDICINE

## 2019-09-02 PROCEDURE — 25010000002 NA FERRIC GLUC CPLX PER 12.5 MG: Performed by: INTERNAL MEDICINE

## 2019-09-02 PROCEDURE — 25010000002 METHYLPREDNISOLONE PER 40 MG: Performed by: FAMILY MEDICINE

## 2019-09-02 PROCEDURE — 97162 PT EVAL MOD COMPLEX 30 MIN: CPT

## 2019-09-02 PROCEDURE — 99232 SBSQ HOSP IP/OBS MODERATE 35: CPT | Performed by: PSYCHIATRY & NEUROLOGY

## 2019-09-02 PROCEDURE — 25010000002 LEVETIRACETAM IN NACL 0.82% 500 MG/100ML SOLUTION: Performed by: PSYCHIATRY & NEUROLOGY

## 2019-09-02 PROCEDURE — 97110 THERAPEUTIC EXERCISES: CPT

## 2019-09-02 PROCEDURE — 70450 CT HEAD/BRAIN W/O DYE: CPT

## 2019-09-02 PROCEDURE — 25010000002 HEPARIN (PORCINE) PER 1000 UNITS: Performed by: FAMILY MEDICINE

## 2019-09-02 PROCEDURE — 97530 THERAPEUTIC ACTIVITIES: CPT

## 2019-09-02 RX ORDER — LEVETIRACETAM 500 MG/1
500 TABLET ORAL EVERY 12 HOURS SCHEDULED
Status: DISCONTINUED | OUTPATIENT
Start: 2019-09-02 | End: 2019-09-02

## 2019-09-02 RX ORDER — PREDNISONE 20 MG/1
20 TABLET ORAL
Status: DISCONTINUED | OUTPATIENT
Start: 2019-09-03 | End: 2019-09-07

## 2019-09-02 RX ORDER — AMLODIPINE BESYLATE 5 MG/1
5 TABLET ORAL
Status: DISCONTINUED | OUTPATIENT
Start: 2019-09-02 | End: 2019-09-26 | Stop reason: HOSPADM

## 2019-09-02 RX ADMIN — IPRATROPIUM BROMIDE AND ALBUTEROL SULFATE 3 ML: 2.5; .5 SOLUTION RESPIRATORY (INHALATION) at 08:18

## 2019-09-02 RX ADMIN — BUDESONIDE 0.5 MG: 0.5 INHALANT RESPIRATORY (INHALATION) at 08:19

## 2019-09-02 RX ADMIN — IPRATROPIUM BROMIDE AND ALBUTEROL SULFATE 3 ML: 2.5; .5 SOLUTION RESPIRATORY (INHALATION) at 18:19

## 2019-09-02 RX ADMIN — HEPARIN SODIUM 5000 UNITS: 5000 INJECTION INTRAVENOUS; SUBCUTANEOUS at 08:22

## 2019-09-02 RX ADMIN — METHYLPREDNISOLONE SODIUM SUCCINATE 20 MG: 40 INJECTION, POWDER, FOR SOLUTION INTRAMUSCULAR; INTRAVENOUS at 08:22

## 2019-09-02 RX ADMIN — METOPROLOL TARTRATE 25 MG: 25 TABLET ORAL at 12:05

## 2019-09-02 RX ADMIN — LEVETIRACETAM 500 MG: 5 INJECTION INTRAVENOUS at 06:33

## 2019-09-02 RX ADMIN — DEXTROSE AND SODIUM CHLORIDE 75 ML/HR: 5; 450 INJECTION, SOLUTION INTRAVENOUS at 12:52

## 2019-09-02 RX ADMIN — BUDESONIDE 0.5 MG: 0.5 INHALANT RESPIRATORY (INHALATION) at 18:19

## 2019-09-02 RX ADMIN — SODIUM CHLORIDE 125 MG: 9 INJECTION, SOLUTION INTRAVENOUS at 12:52

## 2019-09-02 RX ADMIN — METOPROLOL TARTRATE 25 MG: 25 TABLET ORAL at 22:02

## 2019-09-02 RX ADMIN — IPRATROPIUM BROMIDE AND ALBUTEROL SULFATE 3 ML: 2.5; .5 SOLUTION RESPIRATORY (INHALATION) at 13:38

## 2019-09-02 RX ADMIN — CEFTRIAXONE 1 G: 1 INJECTION, POWDER, FOR SOLUTION INTRAMUSCULAR; INTRAVENOUS at 12:05

## 2019-09-02 RX ADMIN — HEPARIN SODIUM 5000 UNITS: 5000 INJECTION INTRAVENOUS; SUBCUTANEOUS at 22:02

## 2019-09-02 RX ADMIN — AMLODIPINE BESYLATE 5 MG: 5 TABLET ORAL at 16:09

## 2019-09-02 RX ADMIN — IPRATROPIUM BROMIDE AND ALBUTEROL SULFATE 3 ML: 2.5; .5 SOLUTION RESPIRATORY (INHALATION) at 00:32

## 2019-09-03 ENCOUNTER — APPOINTMENT (OUTPATIENT)
Dept: GENERAL RADIOLOGY | Facility: HOSPITAL | Age: 69
End: 2019-09-03

## 2019-09-03 LAB
ANION GAP SERPL CALCULATED.3IONS-SCNC: 14 MMOL/L (ref 5–15)
BACTERIA SPEC AEROBE CULT: NORMAL
BACTERIA SPEC AEROBE CULT: NORMAL
BUN BLD-MCNC: 33 MG/DL (ref 8–23)
BUN/CREAT SERPL: 21.6 (ref 7–25)
CALCIUM SPEC-SCNC: 8.9 MG/DL (ref 8.6–10.5)
CHLORIDE SERPL-SCNC: 102 MMOL/L (ref 98–107)
CO2 SERPL-SCNC: 28 MMOL/L (ref 22–29)
CREAT BLD-MCNC: 1.53 MG/DL (ref 0.76–1.27)
GFR SERPL CREATININE-BSD FRML MDRD: 45 ML/MIN/1.73
GLUCOSE BLD-MCNC: 110 MG/DL (ref 65–99)
GLUCOSE BLDC GLUCOMTR-MCNC: 72 MG/DL (ref 70–130)
GLUCOSE BLDC GLUCOMTR-MCNC: 76 MG/DL (ref 70–130)
GLUCOSE BLDC GLUCOMTR-MCNC: 80 MG/DL (ref 70–130)
POTASSIUM BLD-SCNC: 4.2 MMOL/L (ref 3.5–5.2)
SODIUM BLD-SCNC: 144 MMOL/L (ref 136–145)

## 2019-09-03 PROCEDURE — 25010000002 CEFTRIAXONE PER 250 MG: Performed by: FAMILY MEDICINE

## 2019-09-03 PROCEDURE — 94799 UNLISTED PULMONARY SVC/PX: CPT

## 2019-09-03 PROCEDURE — 97530 THERAPEUTIC ACTIVITIES: CPT

## 2019-09-03 PROCEDURE — 97110 THERAPEUTIC EXERCISES: CPT

## 2019-09-03 PROCEDURE — 25010000002 HEPARIN (PORCINE) PER 1000 UNITS: Performed by: FAMILY MEDICINE

## 2019-09-03 PROCEDURE — 82962 GLUCOSE BLOOD TEST: CPT

## 2019-09-03 PROCEDURE — 74230 X-RAY XM SWLNG FUNCJ C+: CPT

## 2019-09-03 PROCEDURE — 25010000002 NA FERRIC GLUC CPLX PER 12.5 MG: Performed by: INTERNAL MEDICINE

## 2019-09-03 PROCEDURE — 80048 BASIC METABOLIC PNL TOTAL CA: CPT | Performed by: INTERNAL MEDICINE

## 2019-09-03 PROCEDURE — 99233 SBSQ HOSP IP/OBS HIGH 50: CPT | Performed by: INTERNAL MEDICINE

## 2019-09-03 PROCEDURE — 92611 MOTION FLUOROSCOPY/SWALLOW: CPT

## 2019-09-03 PROCEDURE — 63710000001 PREDNISONE PER 1 MG: Performed by: INTERNAL MEDICINE

## 2019-09-03 RX ORDER — SODIUM CHLORIDE 9 MG/ML
60 INJECTION, SOLUTION INTRAVENOUS CONTINUOUS
Status: DISCONTINUED | OUTPATIENT
Start: 2019-09-03 | End: 2019-09-10

## 2019-09-03 RX ORDER — HYDROXYZINE HYDROCHLORIDE 25 MG/1
25 TABLET, FILM COATED ORAL ONCE
Status: COMPLETED | OUTPATIENT
Start: 2019-09-03 | End: 2019-09-03

## 2019-09-03 RX ADMIN — AMLODIPINE BESYLATE 5 MG: 5 TABLET ORAL at 12:44

## 2019-09-03 RX ADMIN — BUDESONIDE 0.5 MG: 0.5 INHALANT RESPIRATORY (INHALATION) at 19:42

## 2019-09-03 RX ADMIN — SODIUM CHLORIDE, PRESERVATIVE FREE 10 ML: 5 INJECTION INTRAVENOUS at 12:50

## 2019-09-03 RX ADMIN — IPRATROPIUM BROMIDE AND ALBUTEROL SULFATE 3 ML: 2.5; .5 SOLUTION RESPIRATORY (INHALATION) at 07:58

## 2019-09-03 RX ADMIN — IPRATROPIUM BROMIDE AND ALBUTEROL SULFATE 3 ML: 2.5; .5 SOLUTION RESPIRATORY (INHALATION) at 14:37

## 2019-09-03 RX ADMIN — SODIUM CHLORIDE 75 ML/HR: 9 INJECTION, SOLUTION INTRAVENOUS at 14:57

## 2019-09-03 RX ADMIN — CEFTRIAXONE 1 G: 1 INJECTION, POWDER, FOR SOLUTION INTRAMUSCULAR; INTRAVENOUS at 13:48

## 2019-09-03 RX ADMIN — HYDROXYZINE HYDROCHLORIDE 25 MG: 25 TABLET, FILM COATED ORAL at 21:04

## 2019-09-03 RX ADMIN — HEPARIN SODIUM 5000 UNITS: 5000 INJECTION INTRAVENOUS; SUBCUTANEOUS at 12:45

## 2019-09-03 RX ADMIN — SODIUM CHLORIDE 125 MG: 9 INJECTION, SOLUTION INTRAVENOUS at 12:46

## 2019-09-03 RX ADMIN — BARIUM SULFATE 20 ML: 400 PASTE ORAL at 12:10

## 2019-09-03 RX ADMIN — IPRATROPIUM BROMIDE AND ALBUTEROL SULFATE 3 ML: 2.5; .5 SOLUTION RESPIRATORY (INHALATION) at 00:15

## 2019-09-03 RX ADMIN — HEPARIN SODIUM 5000 UNITS: 5000 INJECTION INTRAVENOUS; SUBCUTANEOUS at 20:46

## 2019-09-03 RX ADMIN — BARIUM SULFATE 20 ML: 0.81 POWDER, FOR SUSPENSION ORAL at 12:10

## 2019-09-03 RX ADMIN — METOPROLOL TARTRATE 25 MG: 25 TABLET ORAL at 20:45

## 2019-09-03 RX ADMIN — IPRATROPIUM BROMIDE AND ALBUTEROL SULFATE 3 ML: 2.5; .5 SOLUTION RESPIRATORY (INHALATION) at 19:42

## 2019-09-03 RX ADMIN — PREDNISONE 20 MG: 20 TABLET ORAL at 12:45

## 2019-09-03 RX ADMIN — BUDESONIDE 0.5 MG: 0.5 INHALANT RESPIRATORY (INHALATION) at 07:58

## 2019-09-03 RX ADMIN — METOPROLOL TARTRATE 25 MG: 25 TABLET ORAL at 12:44

## 2019-09-04 PROCEDURE — 25010000002 HALOPERIDOL LACTATE PER 5 MG: Performed by: INTERNAL MEDICINE

## 2019-09-04 PROCEDURE — 25010000002 HEPARIN (PORCINE) PER 1000 UNITS: Performed by: FAMILY MEDICINE

## 2019-09-04 PROCEDURE — 97110 THERAPEUTIC EXERCISES: CPT

## 2019-09-04 PROCEDURE — 94799 UNLISTED PULMONARY SVC/PX: CPT

## 2019-09-04 PROCEDURE — 25010000002 CEFTRIAXONE PER 250 MG: Performed by: FAMILY MEDICINE

## 2019-09-04 PROCEDURE — 25010000002 NA FERRIC GLUC CPLX PER 12.5 MG: Performed by: INTERNAL MEDICINE

## 2019-09-04 PROCEDURE — 99233 SBSQ HOSP IP/OBS HIGH 50: CPT | Performed by: INTERNAL MEDICINE

## 2019-09-04 PROCEDURE — 92526 ORAL FUNCTION THERAPY: CPT

## 2019-09-04 RX ORDER — QUETIAPINE FUMARATE 25 MG/1
25 TABLET, FILM COATED ORAL ONCE
Status: DISCONTINUED | OUTPATIENT
Start: 2019-09-04 | End: 2019-09-13

## 2019-09-04 RX ORDER — HALOPERIDOL 5 MG/ML
2 INJECTION INTRAMUSCULAR EVERY 6 HOURS PRN
Status: DISCONTINUED | OUTPATIENT
Start: 2019-09-04 | End: 2019-09-26 | Stop reason: HOSPADM

## 2019-09-04 RX ADMIN — SODIUM CHLORIDE, PRESERVATIVE FREE 10 ML: 5 INJECTION INTRAVENOUS at 21:06

## 2019-09-04 RX ADMIN — BUDESONIDE 0.5 MG: 0.5 INHALANT RESPIRATORY (INHALATION) at 07:20

## 2019-09-04 RX ADMIN — SODIUM CHLORIDE 75 ML/HR: 9 INJECTION, SOLUTION INTRAVENOUS at 09:35

## 2019-09-04 RX ADMIN — BUDESONIDE 0.5 MG: 0.5 INHALANT RESPIRATORY (INHALATION) at 18:59

## 2019-09-04 RX ADMIN — IPRATROPIUM BROMIDE AND ALBUTEROL SULFATE 3 ML: 2.5; .5 SOLUTION RESPIRATORY (INHALATION) at 07:20

## 2019-09-04 RX ADMIN — SODIUM CHLORIDE 125 MG: 9 INJECTION, SOLUTION INTRAVENOUS at 09:49

## 2019-09-04 RX ADMIN — HEPARIN SODIUM 5000 UNITS: 5000 INJECTION INTRAVENOUS; SUBCUTANEOUS at 09:22

## 2019-09-04 RX ADMIN — CEFTRIAXONE 1 G: 1 INJECTION, POWDER, FOR SOLUTION INTRAMUSCULAR; INTRAVENOUS at 12:41

## 2019-09-04 RX ADMIN — HALOPERIDOL LACTATE 2 MG: 5 INJECTION INTRAMUSCULAR at 20:15

## 2019-09-04 RX ADMIN — IPRATROPIUM BROMIDE AND ALBUTEROL SULFATE 3 ML: 2.5; .5 SOLUTION RESPIRATORY (INHALATION) at 00:20

## 2019-09-04 RX ADMIN — IPRATROPIUM BROMIDE AND ALBUTEROL SULFATE 3 ML: 2.5; .5 SOLUTION RESPIRATORY (INHALATION) at 18:59

## 2019-09-04 RX ADMIN — IPRATROPIUM BROMIDE AND ALBUTEROL SULFATE 3 ML: 2.5; .5 SOLUTION RESPIRATORY (INHALATION) at 13:29

## 2019-09-04 RX ADMIN — HEPARIN SODIUM 5000 UNITS: 5000 INJECTION INTRAVENOUS; SUBCUTANEOUS at 21:06

## 2019-09-05 ENCOUNTER — APPOINTMENT (OUTPATIENT)
Dept: CT IMAGING | Facility: HOSPITAL | Age: 69
End: 2019-09-05

## 2019-09-05 ENCOUNTER — APPOINTMENT (OUTPATIENT)
Dept: GENERAL RADIOLOGY | Facility: HOSPITAL | Age: 69
End: 2019-09-05

## 2019-09-05 PROCEDURE — 92611 MOTION FLUOROSCOPY/SWALLOW: CPT

## 2019-09-05 PROCEDURE — 97110 THERAPEUTIC EXERCISES: CPT

## 2019-09-05 PROCEDURE — 97116 GAIT TRAINING THERAPY: CPT

## 2019-09-05 PROCEDURE — 94799 UNLISTED PULMONARY SVC/PX: CPT

## 2019-09-05 PROCEDURE — 74230 X-RAY XM SWLNG FUNCJ C+: CPT

## 2019-09-05 PROCEDURE — 99233 SBSQ HOSP IP/OBS HIGH 50: CPT | Performed by: HOSPITALIST

## 2019-09-05 PROCEDURE — 74176 CT ABD & PELVIS W/O CONTRAST: CPT

## 2019-09-05 PROCEDURE — 25010000002 NA FERRIC GLUC CPLX PER 12.5 MG: Performed by: INTERNAL MEDICINE

## 2019-09-05 PROCEDURE — 63710000001 PREDNISONE PER 1 MG: Performed by: INTERNAL MEDICINE

## 2019-09-05 PROCEDURE — 97535 SELF CARE MNGMENT TRAINING: CPT | Performed by: OCCUPATIONAL THERAPIST

## 2019-09-05 PROCEDURE — 25010000002 HEPARIN (PORCINE) PER 1000 UNITS: Performed by: FAMILY MEDICINE

## 2019-09-05 RX ORDER — QUETIAPINE FUMARATE 25 MG/1
12.5 TABLET, FILM COATED ORAL 3 TIMES DAILY PRN
Status: DISCONTINUED | OUTPATIENT
Start: 2019-09-05 | End: 2019-09-26 | Stop reason: HOSPADM

## 2019-09-05 RX ORDER — ESCITALOPRAM OXALATE 20 MG/1
20 TABLET ORAL DAILY
Status: DISCONTINUED | OUTPATIENT
Start: 2019-09-05 | End: 2019-09-26 | Stop reason: HOSPADM

## 2019-09-05 RX ADMIN — SODIUM CHLORIDE 125 MG: 9 INJECTION, SOLUTION INTRAVENOUS at 09:04

## 2019-09-05 RX ADMIN — METOPROLOL TARTRATE 25 MG: 25 TABLET ORAL at 20:02

## 2019-09-05 RX ADMIN — BARIUM SULFATE 100 ML: 0.81 POWDER, FOR SUSPENSION ORAL at 14:41

## 2019-09-05 RX ADMIN — BUDESONIDE 0.5 MG: 0.5 INHALANT RESPIRATORY (INHALATION) at 07:31

## 2019-09-05 RX ADMIN — QUETIAPINE FUMARATE 12.5 MG: 25 TABLET ORAL at 23:40

## 2019-09-05 RX ADMIN — SODIUM CHLORIDE 60 ML/HR: 9 INJECTION, SOLUTION INTRAVENOUS at 19:31

## 2019-09-05 RX ADMIN — SODIUM CHLORIDE, PRESERVATIVE FREE 10 ML: 5 INJECTION INTRAVENOUS at 20:02

## 2019-09-05 RX ADMIN — AMLODIPINE BESYLATE 5 MG: 5 TABLET ORAL at 12:36

## 2019-09-05 RX ADMIN — IPRATROPIUM BROMIDE AND ALBUTEROL SULFATE 3 ML: 2.5; .5 SOLUTION RESPIRATORY (INHALATION) at 07:31

## 2019-09-05 RX ADMIN — METOPROLOL TARTRATE 25 MG: 25 TABLET ORAL at 12:36

## 2019-09-05 RX ADMIN — HEPARIN SODIUM 5000 UNITS: 5000 INJECTION INTRAVENOUS; SUBCUTANEOUS at 09:05

## 2019-09-05 RX ADMIN — ESCITALOPRAM OXALATE 20 MG: 20 TABLET ORAL at 12:36

## 2019-09-05 RX ADMIN — PREDNISONE 20 MG: 20 TABLET ORAL at 12:36

## 2019-09-05 RX ADMIN — HEPARIN SODIUM 5000 UNITS: 5000 INJECTION INTRAVENOUS; SUBCUTANEOUS at 20:02

## 2019-09-05 RX ADMIN — BARIUM SULFATE 20 ML: 400 PASTE ORAL at 14:41

## 2019-09-05 RX ADMIN — SODIUM CHLORIDE 75 ML/HR: 9 INJECTION, SOLUTION INTRAVENOUS at 00:31

## 2019-09-06 LAB
ALBUMIN SERPL-MCNC: 3.2 G/DL (ref 3.5–5.2)
ANION GAP SERPL CALCULATED.3IONS-SCNC: 6 MMOL/L (ref 5–15)
BUN BLD-MCNC: 32 MG/DL (ref 8–23)
BUN/CREAT SERPL: 27.6 (ref 7–25)
CALCIUM SPEC-SCNC: 7.6 MG/DL (ref 8.6–10.5)
CHLORIDE SERPL-SCNC: 109 MMOL/L (ref 98–107)
CO2 SERPL-SCNC: 28 MMOL/L (ref 22–29)
CREAT BLD-MCNC: 1.16 MG/DL (ref 0.76–1.27)
GFR SERPL CREATININE-BSD FRML MDRD: 62 ML/MIN/1.73
GLUCOSE BLD-MCNC: 91 MG/DL (ref 65–99)
MAGNESIUM SERPL-MCNC: 2.4 MG/DL (ref 1.6–2.4)
PHOSPHATE SERPL-MCNC: 1.5 MG/DL (ref 2.5–4.5)
POTASSIUM BLD-SCNC: 4.2 MMOL/L (ref 3.5–5.2)
SODIUM BLD-SCNC: 143 MMOL/L (ref 136–145)

## 2019-09-06 PROCEDURE — 80069 RENAL FUNCTION PANEL: CPT | Performed by: INTERNAL MEDICINE

## 2019-09-06 PROCEDURE — 92526 ORAL FUNCTION THERAPY: CPT

## 2019-09-06 PROCEDURE — 94799 UNLISTED PULMONARY SVC/PX: CPT

## 2019-09-06 PROCEDURE — 63710000001 PREDNISONE PER 1 MG: Performed by: INTERNAL MEDICINE

## 2019-09-06 PROCEDURE — 97110 THERAPEUTIC EXERCISES: CPT

## 2019-09-06 PROCEDURE — 97535 SELF CARE MNGMENT TRAINING: CPT

## 2019-09-06 PROCEDURE — 99233 SBSQ HOSP IP/OBS HIGH 50: CPT | Performed by: HOSPITALIST

## 2019-09-06 PROCEDURE — 83735 ASSAY OF MAGNESIUM: CPT

## 2019-09-06 PROCEDURE — 25010000002 HEPARIN (PORCINE) PER 1000 UNITS: Performed by: FAMILY MEDICINE

## 2019-09-06 RX ADMIN — HEPARIN SODIUM 5000 UNITS: 5000 INJECTION INTRAVENOUS; SUBCUTANEOUS at 20:23

## 2019-09-06 RX ADMIN — BUDESONIDE 0.5 MG: 0.5 INHALANT RESPIRATORY (INHALATION) at 07:33

## 2019-09-06 RX ADMIN — ACETAMINOPHEN 650 MG: 325 TABLET ORAL at 03:12

## 2019-09-06 RX ADMIN — QUETIAPINE FUMARATE 12.5 MG: 25 TABLET ORAL at 20:23

## 2019-09-06 RX ADMIN — PREDNISONE 20 MG: 20 TABLET ORAL at 08:25

## 2019-09-06 RX ADMIN — IPRATROPIUM BROMIDE AND ALBUTEROL SULFATE 3 ML: 2.5; .5 SOLUTION RESPIRATORY (INHALATION) at 07:31

## 2019-09-06 RX ADMIN — METOPROLOL TARTRATE 25 MG: 25 TABLET ORAL at 08:25

## 2019-09-06 RX ADMIN — IPRATROPIUM BROMIDE AND ALBUTEROL SULFATE 3 ML: 2.5; .5 SOLUTION RESPIRATORY (INHALATION) at 00:22

## 2019-09-06 RX ADMIN — SODIUM CHLORIDE, PRESERVATIVE FREE 10 ML: 5 INJECTION INTRAVENOUS at 20:23

## 2019-09-06 RX ADMIN — ACETAMINOPHEN 650 MG: 325 TABLET ORAL at 20:23

## 2019-09-06 RX ADMIN — HEPARIN SODIUM 5000 UNITS: 5000 INJECTION INTRAVENOUS; SUBCUTANEOUS at 08:25

## 2019-09-06 RX ADMIN — SODIUM PHOSPHATE, MONOBASIC, MONOHYDRATE 30 MMOL: 276; 142 INJECTION, SOLUTION INTRAVENOUS at 09:02

## 2019-09-06 RX ADMIN — METOPROLOL TARTRATE 25 MG: 25 TABLET ORAL at 20:23

## 2019-09-06 RX ADMIN — IPRATROPIUM BROMIDE AND ALBUTEROL SULFATE 3 ML: 2.5; .5 SOLUTION RESPIRATORY (INHALATION) at 19:58

## 2019-09-06 RX ADMIN — BUDESONIDE 0.5 MG: 0.5 INHALANT RESPIRATORY (INHALATION) at 19:58

## 2019-09-06 RX ADMIN — QUETIAPINE FUMARATE 12.5 MG: 25 TABLET ORAL at 13:11

## 2019-09-06 RX ADMIN — IPRATROPIUM BROMIDE AND ALBUTEROL SULFATE 3 ML: 2.5; .5 SOLUTION RESPIRATORY (INHALATION) at 12:38

## 2019-09-06 RX ADMIN — SODIUM CHLORIDE, PRESERVATIVE FREE 10 ML: 5 INJECTION INTRAVENOUS at 08:25

## 2019-09-06 RX ADMIN — ESCITALOPRAM OXALATE 20 MG: 20 TABLET ORAL at 08:25

## 2019-09-06 RX ADMIN — AMLODIPINE BESYLATE 5 MG: 5 TABLET ORAL at 08:25

## 2019-09-07 LAB — PHOSPHATE SERPL-MCNC: 1.4 MG/DL (ref 2.5–4.5)

## 2019-09-07 PROCEDURE — 99233 SBSQ HOSP IP/OBS HIGH 50: CPT | Performed by: HOSPITALIST

## 2019-09-07 PROCEDURE — 94799 UNLISTED PULMONARY SVC/PX: CPT

## 2019-09-07 PROCEDURE — 92610 EVALUATE SWALLOWING FUNCTION: CPT

## 2019-09-07 PROCEDURE — 84100 ASSAY OF PHOSPHORUS: CPT | Performed by: HOSPITALIST

## 2019-09-07 PROCEDURE — 25010000002 HEPARIN (PORCINE) PER 1000 UNITS: Performed by: FAMILY MEDICINE

## 2019-09-07 PROCEDURE — 63710000001 PREDNISONE PER 1 MG: Performed by: INTERNAL MEDICINE

## 2019-09-07 PROCEDURE — 97110 THERAPEUTIC EXERCISES: CPT

## 2019-09-07 PROCEDURE — 97116 GAIT TRAINING THERAPY: CPT

## 2019-09-07 RX ORDER — PREDNISONE 10 MG/1
10 TABLET ORAL
Status: DISCONTINUED | OUTPATIENT
Start: 2019-09-08 | End: 2019-09-26 | Stop reason: HOSPADM

## 2019-09-07 RX ORDER — CHOLECALCIFEROL (VITAMIN D3) 125 MCG
5 CAPSULE ORAL NIGHTLY
Status: DISCONTINUED | OUTPATIENT
Start: 2019-09-07 | End: 2019-09-26 | Stop reason: HOSPADM

## 2019-09-07 RX ADMIN — BUDESONIDE 0.5 MG: 0.5 INHALANT RESPIRATORY (INHALATION) at 07:20

## 2019-09-07 RX ADMIN — METOPROLOL TARTRATE 25 MG: 25 TABLET ORAL at 08:14

## 2019-09-07 RX ADMIN — BUDESONIDE 0.5 MG: 0.5 INHALANT RESPIRATORY (INHALATION) at 18:26

## 2019-09-07 RX ADMIN — SODIUM CHLORIDE, PRESERVATIVE FREE 10 ML: 5 INJECTION INTRAVENOUS at 20:29

## 2019-09-07 RX ADMIN — IPRATROPIUM BROMIDE AND ALBUTEROL SULFATE 3 ML: 2.5; .5 SOLUTION RESPIRATORY (INHALATION) at 07:20

## 2019-09-07 RX ADMIN — PREDNISONE 20 MG: 20 TABLET ORAL at 08:14

## 2019-09-07 RX ADMIN — QUETIAPINE FUMARATE 12.5 MG: 25 TABLET ORAL at 14:18

## 2019-09-07 RX ADMIN — QUETIAPINE FUMARATE 12.5 MG: 25 TABLET ORAL at 20:30

## 2019-09-07 RX ADMIN — IPRATROPIUM BROMIDE AND ALBUTEROL SULFATE 3 ML: 2.5; .5 SOLUTION RESPIRATORY (INHALATION) at 13:08

## 2019-09-07 RX ADMIN — QUETIAPINE FUMARATE 12.5 MG: 25 TABLET ORAL at 08:14

## 2019-09-07 RX ADMIN — MELATONIN TAB 5 MG 5 MG: 5 TAB at 20:28

## 2019-09-07 RX ADMIN — SODIUM CHLORIDE, PRESERVATIVE FREE 10 ML: 5 INJECTION INTRAVENOUS at 08:17

## 2019-09-07 RX ADMIN — AMLODIPINE BESYLATE 5 MG: 5 TABLET ORAL at 08:14

## 2019-09-07 RX ADMIN — HEPARIN SODIUM 5000 UNITS: 5000 INJECTION INTRAVENOUS; SUBCUTANEOUS at 20:28

## 2019-09-07 RX ADMIN — POTASSIUM PHOSPHATE, MONOBASIC AND POTASSIUM PHOSPHATE, DIBASIC 30 MMOL: 224; 236 INJECTION, SOLUTION INTRAVENOUS at 10:02

## 2019-09-07 RX ADMIN — HEPARIN SODIUM 5000 UNITS: 5000 INJECTION INTRAVENOUS; SUBCUTANEOUS at 08:14

## 2019-09-07 RX ADMIN — ESCITALOPRAM OXALATE 20 MG: 20 TABLET ORAL at 08:14

## 2019-09-07 RX ADMIN — IPRATROPIUM BROMIDE AND ALBUTEROL SULFATE 3 ML: 2.5; .5 SOLUTION RESPIRATORY (INHALATION) at 18:26

## 2019-09-08 LAB
ALBUMIN SERPL-MCNC: 3.1 G/DL (ref 3.5–5.2)
ANION GAP SERPL CALCULATED.3IONS-SCNC: 7 MMOL/L (ref 5–15)
BUN BLD-MCNC: 19 MG/DL (ref 8–23)
BUN/CREAT SERPL: 17.4 (ref 7–25)
CALCIUM SPEC-SCNC: 7.4 MG/DL (ref 8.6–10.5)
CHLORIDE SERPL-SCNC: 108 MMOL/L (ref 98–107)
CO2 SERPL-SCNC: 28 MMOL/L (ref 22–29)
CREAT BLD-MCNC: 1.09 MG/DL (ref 0.76–1.27)
GFR SERPL CREATININE-BSD FRML MDRD: 67 ML/MIN/1.73
GLUCOSE BLD-MCNC: 86 MG/DL (ref 65–99)
PHOSPHATE SERPL-MCNC: 1.6 MG/DL (ref 2.5–4.5)
POTASSIUM BLD-SCNC: 4.4 MMOL/L (ref 3.5–5.2)
SODIUM BLD-SCNC: 143 MMOL/L (ref 136–145)

## 2019-09-08 PROCEDURE — 94660 CPAP INITIATION&MGMT: CPT

## 2019-09-08 PROCEDURE — 25010000002 ONDANSETRON PER 1 MG: Performed by: FAMILY MEDICINE

## 2019-09-08 PROCEDURE — 63710000001 PREDNISONE PER 5 MG: Performed by: HOSPITALIST

## 2019-09-08 PROCEDURE — 94799 UNLISTED PULMONARY SVC/PX: CPT

## 2019-09-08 PROCEDURE — 99232 SBSQ HOSP IP/OBS MODERATE 35: CPT | Performed by: HOSPITALIST

## 2019-09-08 PROCEDURE — 80069 RENAL FUNCTION PANEL: CPT | Performed by: HOSPITALIST

## 2019-09-08 PROCEDURE — 25010000002 HEPARIN (PORCINE) PER 1000 UNITS: Performed by: FAMILY MEDICINE

## 2019-09-08 RX ORDER — FUROSEMIDE 20 MG/1
20 TABLET ORAL DAILY
Status: DISCONTINUED | OUTPATIENT
Start: 2019-09-08 | End: 2019-09-26 | Stop reason: HOSPADM

## 2019-09-08 RX ADMIN — HEPARIN SODIUM 5000 UNITS: 5000 INJECTION INTRAVENOUS; SUBCUTANEOUS at 19:37

## 2019-09-08 RX ADMIN — SODIUM PHOSPHATE, MONOBASIC, MONOHYDRATE 30 MMOL: 276; 142 INJECTION, SOLUTION INTRAVENOUS at 10:53

## 2019-09-08 RX ADMIN — ESCITALOPRAM OXALATE 20 MG: 20 TABLET ORAL at 08:54

## 2019-09-08 RX ADMIN — METOPROLOL TARTRATE 25 MG: 25 TABLET ORAL at 19:37

## 2019-09-08 RX ADMIN — IPRATROPIUM BROMIDE AND ALBUTEROL SULFATE 3 ML: 2.5; .5 SOLUTION RESPIRATORY (INHALATION) at 12:15

## 2019-09-08 RX ADMIN — FUROSEMIDE 20 MG: 20 TABLET ORAL at 18:22

## 2019-09-08 RX ADMIN — PREDNISONE 10 MG: 10 TABLET ORAL at 08:54

## 2019-09-08 RX ADMIN — HEPARIN SODIUM 5000 UNITS: 5000 INJECTION INTRAVENOUS; SUBCUTANEOUS at 08:54

## 2019-09-08 RX ADMIN — BUDESONIDE 0.5 MG: 0.5 INHALANT RESPIRATORY (INHALATION) at 07:54

## 2019-09-08 RX ADMIN — ONDANSETRON 4 MG: 2 INJECTION INTRAMUSCULAR; INTRAVENOUS at 08:40

## 2019-09-08 RX ADMIN — SODIUM CHLORIDE, PRESERVATIVE FREE 10 ML: 5 INJECTION INTRAVENOUS at 08:54

## 2019-09-08 RX ADMIN — QUETIAPINE FUMARATE 12.5 MG: 25 TABLET ORAL at 16:21

## 2019-09-08 RX ADMIN — BUDESONIDE 0.5 MG: 0.5 INHALANT RESPIRATORY (INHALATION) at 18:16

## 2019-09-08 RX ADMIN — QUETIAPINE FUMARATE 12.5 MG: 25 TABLET ORAL at 05:35

## 2019-09-08 RX ADMIN — IPRATROPIUM BROMIDE AND ALBUTEROL SULFATE 3 ML: 2.5; .5 SOLUTION RESPIRATORY (INHALATION) at 07:54

## 2019-09-08 RX ADMIN — IPRATROPIUM BROMIDE AND ALBUTEROL SULFATE 3 ML: 2.5; .5 SOLUTION RESPIRATORY (INHALATION) at 00:25

## 2019-09-08 RX ADMIN — METOPROLOL TARTRATE 25 MG: 25 TABLET ORAL at 08:54

## 2019-09-08 RX ADMIN — IPRATROPIUM BROMIDE AND ALBUTEROL SULFATE 3 ML: 2.5; .5 SOLUTION RESPIRATORY (INHALATION) at 18:16

## 2019-09-08 RX ADMIN — MELATONIN TAB 5 MG 5 MG: 5 TAB at 19:37

## 2019-09-08 RX ADMIN — AMLODIPINE BESYLATE 5 MG: 5 TABLET ORAL at 08:54

## 2019-09-08 RX ADMIN — ACETAMINOPHEN 650 MG: 325 TABLET ORAL at 05:35

## 2019-09-08 RX ADMIN — ACETAMINOPHEN 650 MG: 325 TABLET ORAL at 19:37

## 2019-09-09 LAB
ALBUMIN SERPL-MCNC: 3.5 G/DL (ref 3.5–5.2)
ANION GAP SERPL CALCULATED.3IONS-SCNC: 7 MMOL/L (ref 5–15)
BUN BLD-MCNC: 16 MG/DL (ref 8–23)
BUN/CREAT SERPL: 13.2 (ref 7–25)
CALCIUM SPEC-SCNC: 7.4 MG/DL (ref 8.6–10.5)
CHLORIDE SERPL-SCNC: 105 MMOL/L (ref 98–107)
CO2 SERPL-SCNC: 30 MMOL/L (ref 22–29)
CREAT BLD-MCNC: 1.21 MG/DL (ref 0.76–1.27)
GFR SERPL CREATININE-BSD FRML MDRD: 59 ML/MIN/1.73
GLUCOSE BLD-MCNC: 86 MG/DL (ref 65–99)
PHOSPHATE SERPL-MCNC: 2.5 MG/DL (ref 2.5–4.5)
POTASSIUM BLD-SCNC: 4.9 MMOL/L (ref 3.5–5.2)
SODIUM BLD-SCNC: 142 MMOL/L (ref 136–145)

## 2019-09-09 PROCEDURE — 63710000001 PREDNISONE PER 5 MG: Performed by: HOSPITALIST

## 2019-09-09 PROCEDURE — 80069 RENAL FUNCTION PANEL: CPT | Performed by: HOSPITALIST

## 2019-09-09 PROCEDURE — 97535 SELF CARE MNGMENT TRAINING: CPT

## 2019-09-09 PROCEDURE — 97530 THERAPEUTIC ACTIVITIES: CPT

## 2019-09-09 PROCEDURE — 99232 SBSQ HOSP IP/OBS MODERATE 35: CPT | Performed by: HOSPITALIST

## 2019-09-09 PROCEDURE — 97116 GAIT TRAINING THERAPY: CPT

## 2019-09-09 PROCEDURE — 94799 UNLISTED PULMONARY SVC/PX: CPT

## 2019-09-09 PROCEDURE — 25010000002 HEPARIN (PORCINE) PER 1000 UNITS: Performed by: FAMILY MEDICINE

## 2019-09-09 PROCEDURE — 92526 ORAL FUNCTION THERAPY: CPT

## 2019-09-09 RX ADMIN — HEPARIN SODIUM 5000 UNITS: 5000 INJECTION INTRAVENOUS; SUBCUTANEOUS at 08:42

## 2019-09-09 RX ADMIN — HEPARIN SODIUM 5000 UNITS: 5000 INJECTION INTRAVENOUS; SUBCUTANEOUS at 19:16

## 2019-09-09 RX ADMIN — AMLODIPINE BESYLATE 5 MG: 5 TABLET ORAL at 08:42

## 2019-09-09 RX ADMIN — BUDESONIDE 0.5 MG: 0.5 INHALANT RESPIRATORY (INHALATION) at 07:00

## 2019-09-09 RX ADMIN — PREDNISONE 10 MG: 10 TABLET ORAL at 08:42

## 2019-09-09 RX ADMIN — METOPROLOL TARTRATE 25 MG: 25 TABLET ORAL at 19:17

## 2019-09-09 RX ADMIN — BUDESONIDE 0.5 MG: 0.5 INHALANT RESPIRATORY (INHALATION) at 18:34

## 2019-09-09 RX ADMIN — FUROSEMIDE 20 MG: 20 TABLET ORAL at 08:42

## 2019-09-09 RX ADMIN — METOPROLOL TARTRATE 25 MG: 25 TABLET ORAL at 08:42

## 2019-09-09 RX ADMIN — IPRATROPIUM BROMIDE AND ALBUTEROL SULFATE 3 ML: 2.5; .5 SOLUTION RESPIRATORY (INHALATION) at 00:39

## 2019-09-09 RX ADMIN — MELATONIN TAB 5 MG 5 MG: 5 TAB at 19:16

## 2019-09-09 RX ADMIN — IPRATROPIUM BROMIDE AND ALBUTEROL SULFATE 3 ML: 2.5; .5 SOLUTION RESPIRATORY (INHALATION) at 13:04

## 2019-09-09 RX ADMIN — QUETIAPINE FUMARATE 12.5 MG: 25 TABLET ORAL at 02:44

## 2019-09-09 RX ADMIN — IPRATROPIUM BROMIDE AND ALBUTEROL SULFATE 3 ML: 2.5; .5 SOLUTION RESPIRATORY (INHALATION) at 06:59

## 2019-09-09 RX ADMIN — QUETIAPINE FUMARATE 12.5 MG: 25 TABLET ORAL at 11:39

## 2019-09-09 RX ADMIN — IPRATROPIUM BROMIDE AND ALBUTEROL SULFATE 3 ML: 2.5; .5 SOLUTION RESPIRATORY (INHALATION) at 18:34

## 2019-09-09 RX ADMIN — ESCITALOPRAM OXALATE 20 MG: 20 TABLET ORAL at 08:42

## 2019-09-09 RX ADMIN — QUETIAPINE FUMARATE 12.5 MG: 25 TABLET ORAL at 19:16

## 2019-09-10 LAB
BACTERIA UR QL AUTO: ABNORMAL /HPF
BILIRUB UR QL STRIP: NEGATIVE
CLARITY UR: CLEAR
COLOR UR: YELLOW
GLUCOSE UR STRIP-MCNC: NEGATIVE MG/DL
HGB UR QL STRIP.AUTO: NEGATIVE
HYALINE CASTS UR QL AUTO: ABNORMAL /LPF
KETONES UR QL STRIP: NEGATIVE
LEUKOCYTE ESTERASE UR QL STRIP.AUTO: ABNORMAL
NITRITE UR QL STRIP: NEGATIVE
PH UR STRIP.AUTO: 7 [PH] (ref 5–8)
PROT UR QL STRIP: NEGATIVE
RBC # UR: ABNORMAL /HPF
REF LAB TEST METHOD: ABNORMAL
SP GR UR STRIP: 1.01 (ref 1–1.03)
SQUAMOUS #/AREA URNS HPF: ABNORMAL /HPF
UROBILINOGEN UR QL STRIP: ABNORMAL
WBC UR QL AUTO: ABNORMAL /HPF

## 2019-09-10 PROCEDURE — 81001 URINALYSIS AUTO W/SCOPE: CPT | Performed by: HOSPITALIST

## 2019-09-10 PROCEDURE — 94660 CPAP INITIATION&MGMT: CPT

## 2019-09-10 PROCEDURE — 25010000002 HEPARIN (PORCINE) PER 1000 UNITS: Performed by: FAMILY MEDICINE

## 2019-09-10 PROCEDURE — 94799 UNLISTED PULMONARY SVC/PX: CPT

## 2019-09-10 PROCEDURE — 97535 SELF CARE MNGMENT TRAINING: CPT

## 2019-09-10 PROCEDURE — 63710000001 PREDNISONE PER 5 MG: Performed by: HOSPITALIST

## 2019-09-10 PROCEDURE — 99232 SBSQ HOSP IP/OBS MODERATE 35: CPT | Performed by: HOSPITALIST

## 2019-09-10 PROCEDURE — 97110 THERAPEUTIC EXERCISES: CPT

## 2019-09-10 PROCEDURE — 97116 GAIT TRAINING THERAPY: CPT

## 2019-09-10 RX ORDER — ONDANSETRON 4 MG/1
4 TABLET, FILM COATED ORAL EVERY 6 HOURS PRN
Status: DISCONTINUED | OUTPATIENT
Start: 2019-09-10 | End: 2019-09-26 | Stop reason: HOSPADM

## 2019-09-10 RX ORDER — DIPHENOXYLATE HYDROCHLORIDE AND ATROPINE SULFATE 2.5; .025 MG/1; MG/1
1 TABLET ORAL 4 TIMES DAILY PRN
Status: DISCONTINUED | OUTPATIENT
Start: 2019-09-10 | End: 2019-09-26 | Stop reason: HOSPADM

## 2019-09-10 RX ADMIN — AMLODIPINE BESYLATE 5 MG: 5 TABLET ORAL at 08:43

## 2019-09-10 RX ADMIN — METOPROLOL TARTRATE 25 MG: 25 TABLET ORAL at 08:43

## 2019-09-10 RX ADMIN — IPRATROPIUM BROMIDE AND ALBUTEROL SULFATE 3 ML: 2.5; .5 SOLUTION RESPIRATORY (INHALATION) at 12:42

## 2019-09-10 RX ADMIN — ONDANSETRON HYDROCHLORIDE 4 MG: 4 TABLET, FILM COATED ORAL at 11:24

## 2019-09-10 RX ADMIN — DIPHENOXYLATE HYDROCHLORIDE AND ATROPINE SULFATE 1 TABLET: 2.5; .025 TABLET ORAL at 13:42

## 2019-09-10 RX ADMIN — BUDESONIDE 0.5 MG: 0.5 INHALANT RESPIRATORY (INHALATION) at 18:43

## 2019-09-10 RX ADMIN — HEPARIN SODIUM 5000 UNITS: 5000 INJECTION INTRAVENOUS; SUBCUTANEOUS at 08:43

## 2019-09-10 RX ADMIN — ACETAMINOPHEN 650 MG: 325 TABLET ORAL at 03:31

## 2019-09-10 RX ADMIN — ESCITALOPRAM OXALATE 20 MG: 20 TABLET ORAL at 08:43

## 2019-09-10 RX ADMIN — IPRATROPIUM BROMIDE AND ALBUTEROL SULFATE 3 ML: 2.5; .5 SOLUTION RESPIRATORY (INHALATION) at 00:14

## 2019-09-10 RX ADMIN — FUROSEMIDE 20 MG: 20 TABLET ORAL at 08:43

## 2019-09-10 RX ADMIN — METOPROLOL TARTRATE 25 MG: 25 TABLET ORAL at 22:30

## 2019-09-10 RX ADMIN — QUETIAPINE FUMARATE 12.5 MG: 25 TABLET ORAL at 03:56

## 2019-09-10 RX ADMIN — PREDNISONE 10 MG: 10 TABLET ORAL at 08:43

## 2019-09-10 RX ADMIN — BUDESONIDE 0.5 MG: 0.5 INHALANT RESPIRATORY (INHALATION) at 07:35

## 2019-09-10 RX ADMIN — MELATONIN TAB 5 MG 5 MG: 5 TAB at 22:28

## 2019-09-10 RX ADMIN — IPRATROPIUM BROMIDE AND ALBUTEROL SULFATE 3 ML: 2.5; .5 SOLUTION RESPIRATORY (INHALATION) at 18:43

## 2019-09-10 RX ADMIN — QUETIAPINE FUMARATE 12.5 MG: 25 TABLET ORAL at 22:31

## 2019-09-10 RX ADMIN — IPRATROPIUM BROMIDE AND ALBUTEROL SULFATE 3 ML: 2.5; .5 SOLUTION RESPIRATORY (INHALATION) at 07:35

## 2019-09-10 RX ADMIN — ACETAMINOPHEN 650 MG: 325 TABLET ORAL at 08:43

## 2019-09-10 RX ADMIN — HEPARIN SODIUM 5000 UNITS: 5000 INJECTION INTRAVENOUS; SUBCUTANEOUS at 22:30

## 2019-09-11 LAB
ALBUMIN SERPL-MCNC: 4 G/DL (ref 3.5–5.2)
ANION GAP SERPL CALCULATED.3IONS-SCNC: 11 MMOL/L (ref 5–15)
BUN BLD-MCNC: 15 MG/DL (ref 8–23)
BUN/CREAT SERPL: 11 (ref 7–25)
CALCIUM SPEC-SCNC: 8.9 MG/DL (ref 8.6–10.5)
CHLORIDE SERPL-SCNC: 99 MMOL/L (ref 98–107)
CO2 SERPL-SCNC: 33 MMOL/L (ref 22–29)
CREAT BLD-MCNC: 1.36 MG/DL (ref 0.76–1.27)
GFR SERPL CREATININE-BSD FRML MDRD: 52 ML/MIN/1.73
GLUCOSE BLD-MCNC: 83 MG/DL (ref 65–99)
PHOSPHATE SERPL-MCNC: 1.9 MG/DL (ref 2.5–4.5)
POTASSIUM BLD-SCNC: 4.3 MMOL/L (ref 3.5–5.2)
SODIUM BLD-SCNC: 143 MMOL/L (ref 136–145)

## 2019-09-11 PROCEDURE — 94799 UNLISTED PULMONARY SVC/PX: CPT

## 2019-09-11 PROCEDURE — 25010000002 HEPARIN (PORCINE) PER 1000 UNITS: Performed by: FAMILY MEDICINE

## 2019-09-11 PROCEDURE — 92526 ORAL FUNCTION THERAPY: CPT

## 2019-09-11 PROCEDURE — 63710000001 PREDNISONE PER 5 MG: Performed by: HOSPITALIST

## 2019-09-11 PROCEDURE — 80069 RENAL FUNCTION PANEL: CPT | Performed by: HOSPITALIST

## 2019-09-11 PROCEDURE — 99232 SBSQ HOSP IP/OBS MODERATE 35: CPT | Performed by: HOSPITALIST

## 2019-09-11 PROCEDURE — 94660 CPAP INITIATION&MGMT: CPT

## 2019-09-11 PROCEDURE — 97116 GAIT TRAINING THERAPY: CPT

## 2019-09-11 PROCEDURE — 25010000002 CEFTRIAXONE PER 250 MG: Performed by: HOSPITALIST

## 2019-09-11 RX ORDER — TAMSULOSIN HYDROCHLORIDE 0.4 MG/1
0.8 CAPSULE ORAL DAILY
Status: DISCONTINUED | OUTPATIENT
Start: 2019-09-11 | End: 2019-09-26 | Stop reason: HOSPADM

## 2019-09-11 RX ORDER — LIDOCAINE HYDROCHLORIDE 10 MG/ML
2.1 INJECTION, SOLUTION INFILTRATION; PERINEURAL
Status: DISCONTINUED | OUTPATIENT
Start: 2019-09-11 | End: 2019-09-13

## 2019-09-11 RX ORDER — CEFTRIAXONE 1 G/1
1 INJECTION, POWDER, FOR SOLUTION INTRAMUSCULAR; INTRAVENOUS EVERY 24 HOURS
Status: COMPLETED | OUTPATIENT
Start: 2019-09-11 | End: 2019-09-17

## 2019-09-11 RX ADMIN — IPRATROPIUM BROMIDE AND ALBUTEROL SULFATE 3 ML: 2.5; .5 SOLUTION RESPIRATORY (INHALATION) at 18:44

## 2019-09-11 RX ADMIN — LIDOCAINE HYDROCHLORIDE 2.1 ML: 10 INJECTION, SOLUTION EPIDURAL; INFILTRATION; INTRACAUDAL; PERINEURAL at 11:46

## 2019-09-11 RX ADMIN — HEPARIN SODIUM 5000 UNITS: 5000 INJECTION INTRAVENOUS; SUBCUTANEOUS at 09:04

## 2019-09-11 RX ADMIN — IPRATROPIUM BROMIDE AND ALBUTEROL SULFATE 3 ML: 2.5; .5 SOLUTION RESPIRATORY (INHALATION) at 12:24

## 2019-09-11 RX ADMIN — AMLODIPINE BESYLATE 5 MG: 5 TABLET ORAL at 09:05

## 2019-09-11 RX ADMIN — BUDESONIDE 0.5 MG: 0.5 INHALANT RESPIRATORY (INHALATION) at 06:46

## 2019-09-11 RX ADMIN — ESCITALOPRAM OXALATE 20 MG: 20 TABLET ORAL at 09:04

## 2019-09-11 RX ADMIN — HEPARIN SODIUM 5000 UNITS: 5000 INJECTION INTRAVENOUS; SUBCUTANEOUS at 22:25

## 2019-09-11 RX ADMIN — IPRATROPIUM BROMIDE AND ALBUTEROL SULFATE 3 ML: 2.5; .5 SOLUTION RESPIRATORY (INHALATION) at 06:46

## 2019-09-11 RX ADMIN — METOPROLOL TARTRATE 25 MG: 25 TABLET ORAL at 09:05

## 2019-09-11 RX ADMIN — ONDANSETRON HYDROCHLORIDE 4 MG: 4 TABLET, FILM COATED ORAL at 18:48

## 2019-09-11 RX ADMIN — IPRATROPIUM BROMIDE AND ALBUTEROL SULFATE 3 ML: 2.5; .5 SOLUTION RESPIRATORY (INHALATION) at 02:00

## 2019-09-11 RX ADMIN — FUROSEMIDE 20 MG: 20 TABLET ORAL at 09:05

## 2019-09-11 RX ADMIN — PREDNISONE 10 MG: 10 TABLET ORAL at 09:05

## 2019-09-11 RX ADMIN — QUETIAPINE FUMARATE 12.5 MG: 25 TABLET ORAL at 22:26

## 2019-09-11 RX ADMIN — TAMSULOSIN HYDROCHLORIDE 0.8 MG: 0.4 CAPSULE ORAL at 11:46

## 2019-09-11 RX ADMIN — MELATONIN TAB 5 MG 5 MG: 5 TAB at 22:24

## 2019-09-11 RX ADMIN — BUDESONIDE 0.5 MG: 0.5 INHALANT RESPIRATORY (INHALATION) at 18:44

## 2019-09-11 RX ADMIN — METOPROLOL TARTRATE 25 MG: 25 TABLET ORAL at 22:24

## 2019-09-11 RX ADMIN — CEFTRIAXONE 1 G: 1 INJECTION, POWDER, FOR SOLUTION INTRAMUSCULAR; INTRAVENOUS at 11:46

## 2019-09-12 ENCOUNTER — APPOINTMENT (OUTPATIENT)
Dept: GENERAL RADIOLOGY | Facility: HOSPITAL | Age: 69
End: 2019-09-12

## 2019-09-12 PROBLEM — R04.2 COUGH WITH HEMOPTYSIS: Status: ACTIVE | Noted: 2019-09-12

## 2019-09-12 PROBLEM — R58 RETROPERITONEAL HEMORRHAGE: Status: ACTIVE | Noted: 2019-09-12

## 2019-09-12 PROBLEM — A49.8 ESCHERICHIA COLI INFECTION: Status: ACTIVE | Noted: 2019-09-12

## 2019-09-12 PROBLEM — R91.8 INFILTRATE OF LOWER LOBE OF RIGHT LUNG PRESENT ON IMAGING STUDY: Status: ACTIVE | Noted: 2019-09-12

## 2019-09-12 LAB — PHOSPHATE SERPL-MCNC: 2.3 MG/DL (ref 2.5–4.5)

## 2019-09-12 PROCEDURE — 97116 GAIT TRAINING THERAPY: CPT

## 2019-09-12 PROCEDURE — 71045 X-RAY EXAM CHEST 1 VIEW: CPT

## 2019-09-12 PROCEDURE — 25010000002 CEFTRIAXONE PER 250 MG: Performed by: HOSPITALIST

## 2019-09-12 PROCEDURE — 63710000001 PREDNISONE PER 5 MG: Performed by: HOSPITALIST

## 2019-09-12 PROCEDURE — 94799 UNLISTED PULMONARY SVC/PX: CPT

## 2019-09-12 PROCEDURE — 97535 SELF CARE MNGMENT TRAINING: CPT | Performed by: OCCUPATIONAL THERAPIST

## 2019-09-12 PROCEDURE — 99232 SBSQ HOSP IP/OBS MODERATE 35: CPT | Performed by: INTERNAL MEDICINE

## 2019-09-12 PROCEDURE — 84100 ASSAY OF PHOSPHORUS: CPT | Performed by: INTERNAL MEDICINE

## 2019-09-12 PROCEDURE — 25010000002 HEPARIN (PORCINE) PER 1000 UNITS: Performed by: FAMILY MEDICINE

## 2019-09-12 RX ORDER — DOCUSATE SODIUM 100 MG/1
100 CAPSULE, LIQUID FILLED ORAL DAILY
Status: DISCONTINUED | OUTPATIENT
Start: 2019-09-12 | End: 2019-09-26 | Stop reason: HOSPADM

## 2019-09-12 RX ADMIN — METOPROLOL TARTRATE 25 MG: 25 TABLET ORAL at 22:16

## 2019-09-12 RX ADMIN — IPRATROPIUM BROMIDE AND ALBUTEROL SULFATE 3 ML: 2.5; .5 SOLUTION RESPIRATORY (INHALATION) at 19:02

## 2019-09-12 RX ADMIN — BUDESONIDE 0.5 MG: 0.5 INHALANT RESPIRATORY (INHALATION) at 07:28

## 2019-09-12 RX ADMIN — PREDNISONE 10 MG: 10 TABLET ORAL at 08:20

## 2019-09-12 RX ADMIN — METOPROLOL TARTRATE 25 MG: 25 TABLET ORAL at 08:16

## 2019-09-12 RX ADMIN — HEPARIN SODIUM 5000 UNITS: 5000 INJECTION INTRAVENOUS; SUBCUTANEOUS at 08:16

## 2019-09-12 RX ADMIN — BUDESONIDE 0.5 MG: 0.5 INHALANT RESPIRATORY (INHALATION) at 19:02

## 2019-09-12 RX ADMIN — DOCUSATE SODIUM 100 MG: 100 CAPSULE, LIQUID FILLED ORAL at 14:07

## 2019-09-12 RX ADMIN — ONDANSETRON HYDROCHLORIDE 4 MG: 4 TABLET, FILM COATED ORAL at 19:17

## 2019-09-12 RX ADMIN — IPRATROPIUM BROMIDE AND ALBUTEROL SULFATE 3 ML: 2.5; .5 SOLUTION RESPIRATORY (INHALATION) at 12:05

## 2019-09-12 RX ADMIN — ESCITALOPRAM OXALATE 20 MG: 20 TABLET ORAL at 08:16

## 2019-09-12 RX ADMIN — QUETIAPINE FUMARATE 12.5 MG: 25 TABLET ORAL at 14:14

## 2019-09-12 RX ADMIN — MELATONIN TAB 5 MG 5 MG: 5 TAB at 22:16

## 2019-09-12 RX ADMIN — ONDANSETRON HYDROCHLORIDE 4 MG: 4 TABLET, FILM COATED ORAL at 08:21

## 2019-09-12 RX ADMIN — CEFTRIAXONE 1 G: 1 INJECTION, POWDER, FOR SOLUTION INTRAMUSCULAR; INTRAVENOUS at 15:32

## 2019-09-12 RX ADMIN — POTASSIUM & SODIUM PHOSPHATES POWDER PACK 280-160-250 MG 2 PACKET: 280-160-250 PACK at 14:08

## 2019-09-12 RX ADMIN — AMLODIPINE BESYLATE 5 MG: 5 TABLET ORAL at 08:16

## 2019-09-12 RX ADMIN — FUROSEMIDE 20 MG: 20 TABLET ORAL at 08:16

## 2019-09-12 RX ADMIN — TAMSULOSIN HYDROCHLORIDE 0.8 MG: 0.4 CAPSULE ORAL at 08:16

## 2019-09-12 RX ADMIN — ACETAMINOPHEN 650 MG: 325 TABLET ORAL at 08:16

## 2019-09-12 RX ADMIN — QUETIAPINE FUMARATE 12.5 MG: 25 TABLET ORAL at 22:15

## 2019-09-12 RX ADMIN — LIDOCAINE HYDROCHLORIDE 2.1 ML: 10 INJECTION, SOLUTION EPIDURAL; INFILTRATION; INTRACAUDAL; PERINEURAL at 15:33

## 2019-09-12 RX ADMIN — IPRATROPIUM BROMIDE AND ALBUTEROL SULFATE 3 ML: 2.5; .5 SOLUTION RESPIRATORY (INHALATION) at 07:28

## 2019-09-13 PROCEDURE — 97116 GAIT TRAINING THERAPY: CPT

## 2019-09-13 PROCEDURE — 63710000001 PREDNISONE PER 5 MG: Performed by: HOSPITALIST

## 2019-09-13 PROCEDURE — 25010000002 CEFTRIAXONE PER 250 MG: Performed by: HOSPITALIST

## 2019-09-13 PROCEDURE — 94799 UNLISTED PULMONARY SVC/PX: CPT

## 2019-09-13 PROCEDURE — 99233 SBSQ HOSP IP/OBS HIGH 50: CPT | Performed by: INTERNAL MEDICINE

## 2019-09-13 PROCEDURE — 97110 THERAPEUTIC EXERCISES: CPT

## 2019-09-13 PROCEDURE — 92526 ORAL FUNCTION THERAPY: CPT

## 2019-09-13 PROCEDURE — 97535 SELF CARE MNGMENT TRAINING: CPT

## 2019-09-13 RX ORDER — LIDOCAINE HYDROCHLORIDE 10 MG/ML
2.1 INJECTION, SOLUTION EPIDURAL; INFILTRATION; INTRACAUDAL; PERINEURAL
Status: COMPLETED | OUTPATIENT
Start: 2019-09-14 | End: 2019-09-17

## 2019-09-13 RX ADMIN — METOPROLOL TARTRATE 25 MG: 25 TABLET ORAL at 08:10

## 2019-09-13 RX ADMIN — PREDNISONE 10 MG: 10 TABLET ORAL at 08:12

## 2019-09-13 RX ADMIN — AMLODIPINE BESYLATE 5 MG: 5 TABLET ORAL at 08:10

## 2019-09-13 RX ADMIN — QUETIAPINE FUMARATE 12.5 MG: 25 TABLET ORAL at 20:51

## 2019-09-13 RX ADMIN — TAMSULOSIN HYDROCHLORIDE 0.8 MG: 0.4 CAPSULE ORAL at 08:10

## 2019-09-13 RX ADMIN — POLYETHYLENE GLYCOL 3350 17 G: 17 POWDER, FOR SOLUTION ORAL at 20:51

## 2019-09-13 RX ADMIN — ACETAMINOPHEN 650 MG: 325 TABLET ORAL at 20:51

## 2019-09-13 RX ADMIN — IPRATROPIUM BROMIDE AND ALBUTEROL SULFATE 3 ML: 2.5; .5 SOLUTION RESPIRATORY (INHALATION) at 08:02

## 2019-09-13 RX ADMIN — DOCUSATE SODIUM 100 MG: 100 CAPSULE, LIQUID FILLED ORAL at 08:10

## 2019-09-13 RX ADMIN — QUETIAPINE FUMARATE 12.5 MG: 25 TABLET ORAL at 08:10

## 2019-09-13 RX ADMIN — ONDANSETRON HYDROCHLORIDE 4 MG: 4 TABLET, FILM COATED ORAL at 13:59

## 2019-09-13 RX ADMIN — MELATONIN TAB 5 MG 5 MG: 5 TAB at 20:51

## 2019-09-13 RX ADMIN — IPRATROPIUM BROMIDE AND ALBUTEROL SULFATE 3 ML: 2.5; .5 SOLUTION RESPIRATORY (INHALATION) at 19:14

## 2019-09-13 RX ADMIN — CEFTRIAXONE 1 G: 1 INJECTION, POWDER, FOR SOLUTION INTRAMUSCULAR; INTRAVENOUS at 13:52

## 2019-09-13 RX ADMIN — BUDESONIDE 0.5 MG: 0.5 INHALANT RESPIRATORY (INHALATION) at 08:03

## 2019-09-13 RX ADMIN — FUROSEMIDE 20 MG: 20 TABLET ORAL at 08:10

## 2019-09-13 RX ADMIN — LIDOCAINE HYDROCHLORIDE 2.1 ML: 10 INJECTION, SOLUTION EPIDURAL; INFILTRATION; INTRACAUDAL; PERINEURAL at 13:52

## 2019-09-13 RX ADMIN — ESCITALOPRAM OXALATE 20 MG: 20 TABLET ORAL at 08:10

## 2019-09-13 RX ADMIN — IPRATROPIUM BROMIDE AND ALBUTEROL SULFATE 3 ML: 2.5; .5 SOLUTION RESPIRATORY (INHALATION) at 12:21

## 2019-09-13 RX ADMIN — METOPROLOL TARTRATE 25 MG: 25 TABLET ORAL at 20:51

## 2019-09-13 RX ADMIN — BUDESONIDE 0.5 MG: 0.5 INHALANT RESPIRATORY (INHALATION) at 19:14

## 2019-09-14 LAB
ANION GAP SERPL CALCULATED.3IONS-SCNC: 10 MMOL/L (ref 5–15)
BUN BLD-MCNC: 17 MG/DL (ref 8–23)
BUN/CREAT SERPL: 12.7 (ref 7–25)
CALCIUM SPEC-SCNC: 9.2 MG/DL (ref 8.6–10.5)
CHLORIDE SERPL-SCNC: 97 MMOL/L (ref 98–107)
CO2 SERPL-SCNC: 33 MMOL/L (ref 22–29)
CREAT BLD-MCNC: 1.34 MG/DL (ref 0.76–1.27)
GFR SERPL CREATININE-BSD FRML MDRD: 53 ML/MIN/1.73
GLUCOSE BLD-MCNC: 123 MG/DL (ref 65–99)
POTASSIUM BLD-SCNC: 3.8 MMOL/L (ref 3.5–5.2)
SODIUM BLD-SCNC: 140 MMOL/L (ref 136–145)

## 2019-09-14 PROCEDURE — 99232 SBSQ HOSP IP/OBS MODERATE 35: CPT | Performed by: INTERNAL MEDICINE

## 2019-09-14 PROCEDURE — 94799 UNLISTED PULMONARY SVC/PX: CPT

## 2019-09-14 PROCEDURE — 80048 BASIC METABOLIC PNL TOTAL CA: CPT | Performed by: INTERNAL MEDICINE

## 2019-09-14 PROCEDURE — 25010000002 CEFTRIAXONE PER 250 MG: Performed by: HOSPITALIST

## 2019-09-14 PROCEDURE — 63710000001 PREDNISONE PER 5 MG: Performed by: HOSPITALIST

## 2019-09-14 RX ORDER — TRAZODONE HYDROCHLORIDE 50 MG/1
50 TABLET ORAL NIGHTLY
Status: DISCONTINUED | OUTPATIENT
Start: 2019-09-14 | End: 2019-09-26 | Stop reason: HOSPADM

## 2019-09-14 RX ADMIN — METOPROLOL TARTRATE 25 MG: 25 TABLET ORAL at 08:51

## 2019-09-14 RX ADMIN — LIDOCAINE HYDROCHLORIDE 2.1 ML: 10 INJECTION, SOLUTION EPIDURAL; INFILTRATION; INTRACAUDAL; PERINEURAL at 12:23

## 2019-09-14 RX ADMIN — AMLODIPINE BESYLATE 5 MG: 5 TABLET ORAL at 08:51

## 2019-09-14 RX ADMIN — METOPROLOL TARTRATE 25 MG: 25 TABLET ORAL at 20:00

## 2019-09-14 RX ADMIN — TRAZODONE HYDROCHLORIDE 50 MG: 50 TABLET ORAL at 20:00

## 2019-09-14 RX ADMIN — ACETAMINOPHEN 650 MG: 325 TABLET ORAL at 19:58

## 2019-09-14 RX ADMIN — IPRATROPIUM BROMIDE AND ALBUTEROL SULFATE 3 ML: 2.5; .5 SOLUTION RESPIRATORY (INHALATION) at 06:21

## 2019-09-14 RX ADMIN — TAMSULOSIN HYDROCHLORIDE 0.8 MG: 0.4 CAPSULE ORAL at 08:51

## 2019-09-14 RX ADMIN — POLYETHYLENE GLYCOL 3350 17 G: 17 POWDER, FOR SOLUTION ORAL at 19:58

## 2019-09-14 RX ADMIN — ONDANSETRON HYDROCHLORIDE 4 MG: 4 TABLET, FILM COATED ORAL at 12:26

## 2019-09-14 RX ADMIN — BUDESONIDE 0.5 MG: 0.5 INHALANT RESPIRATORY (INHALATION) at 19:14

## 2019-09-14 RX ADMIN — DOCUSATE SODIUM 100 MG: 100 CAPSULE, LIQUID FILLED ORAL at 08:51

## 2019-09-14 RX ADMIN — MELATONIN TAB 5 MG 5 MG: 5 TAB at 20:00

## 2019-09-14 RX ADMIN — IPRATROPIUM BROMIDE AND ALBUTEROL SULFATE 3 ML: 2.5; .5 SOLUTION RESPIRATORY (INHALATION) at 00:15

## 2019-09-14 RX ADMIN — QUETIAPINE FUMARATE 12.5 MG: 25 TABLET ORAL at 20:00

## 2019-09-14 RX ADMIN — FUROSEMIDE 20 MG: 20 TABLET ORAL at 08:51

## 2019-09-14 RX ADMIN — BUDESONIDE 0.5 MG: 0.5 INHALANT RESPIRATORY (INHALATION) at 06:21

## 2019-09-14 RX ADMIN — IPRATROPIUM BROMIDE AND ALBUTEROL SULFATE 3 ML: 2.5; .5 SOLUTION RESPIRATORY (INHALATION) at 19:14

## 2019-09-14 RX ADMIN — QUETIAPINE FUMARATE 12.5 MG: 25 TABLET ORAL at 12:23

## 2019-09-14 RX ADMIN — POLYETHYLENE GLYCOL 3350 17 G: 17 POWDER, FOR SOLUTION ORAL at 08:51

## 2019-09-14 RX ADMIN — CEFTRIAXONE 1 G: 1 INJECTION, POWDER, FOR SOLUTION INTRAMUSCULAR; INTRAVENOUS at 12:22

## 2019-09-14 RX ADMIN — PREDNISONE 10 MG: 10 TABLET ORAL at 08:52

## 2019-09-14 RX ADMIN — ESCITALOPRAM OXALATE 20 MG: 20 TABLET ORAL at 08:51

## 2019-09-14 RX ADMIN — IPRATROPIUM BROMIDE AND ALBUTEROL SULFATE 3 ML: 2.5; .5 SOLUTION RESPIRATORY (INHALATION) at 12:43

## 2019-09-15 PROCEDURE — 94799 UNLISTED PULMONARY SVC/PX: CPT

## 2019-09-15 PROCEDURE — 99232 SBSQ HOSP IP/OBS MODERATE 35: CPT | Performed by: INTERNAL MEDICINE

## 2019-09-15 PROCEDURE — 63710000001 PREDNISONE PER 5 MG: Performed by: HOSPITALIST

## 2019-09-15 PROCEDURE — 94660 CPAP INITIATION&MGMT: CPT

## 2019-09-15 PROCEDURE — 25010000002 CEFTRIAXONE PER 250 MG: Performed by: HOSPITALIST

## 2019-09-15 RX ADMIN — FUROSEMIDE 20 MG: 20 TABLET ORAL at 09:00

## 2019-09-15 RX ADMIN — ACETAMINOPHEN 650 MG: 325 TABLET ORAL at 07:39

## 2019-09-15 RX ADMIN — CEFTRIAXONE 1 G: 1 INJECTION, POWDER, FOR SOLUTION INTRAMUSCULAR; INTRAVENOUS at 13:25

## 2019-09-15 RX ADMIN — IPRATROPIUM BROMIDE AND ALBUTEROL SULFATE 3 ML: 2.5; .5 SOLUTION RESPIRATORY (INHALATION) at 12:37

## 2019-09-15 RX ADMIN — PREDNISONE 10 MG: 10 TABLET ORAL at 09:00

## 2019-09-15 RX ADMIN — METOPROLOL TARTRATE 25 MG: 25 TABLET ORAL at 09:03

## 2019-09-15 RX ADMIN — IPRATROPIUM BROMIDE AND ALBUTEROL SULFATE 3 ML: 2.5; .5 SOLUTION RESPIRATORY (INHALATION) at 06:30

## 2019-09-15 RX ADMIN — IPRATROPIUM BROMIDE AND ALBUTEROL SULFATE 3 ML: 2.5; .5 SOLUTION RESPIRATORY (INHALATION) at 19:06

## 2019-09-15 RX ADMIN — BUDESONIDE 0.5 MG: 0.5 INHALANT RESPIRATORY (INHALATION) at 06:30

## 2019-09-15 RX ADMIN — IPRATROPIUM BROMIDE AND ALBUTEROL SULFATE 3 ML: 2.5; .5 SOLUTION RESPIRATORY (INHALATION) at 00:03

## 2019-09-15 RX ADMIN — AMLODIPINE BESYLATE 5 MG: 5 TABLET ORAL at 09:03

## 2019-09-15 RX ADMIN — IPRATROPIUM BROMIDE AND ALBUTEROL SULFATE 3 ML: 2.5; .5 SOLUTION RESPIRATORY (INHALATION) at 23:31

## 2019-09-15 RX ADMIN — METOPROLOL TARTRATE 25 MG: 25 TABLET ORAL at 21:16

## 2019-09-15 RX ADMIN — ESCITALOPRAM OXALATE 20 MG: 20 TABLET ORAL at 09:00

## 2019-09-15 RX ADMIN — MELATONIN TAB 5 MG 5 MG: 5 TAB at 21:16

## 2019-09-15 RX ADMIN — LIDOCAINE HYDROCHLORIDE 2.1 ML: 10 INJECTION, SOLUTION EPIDURAL; INFILTRATION; INTRACAUDAL; PERINEURAL at 13:26

## 2019-09-15 RX ADMIN — TRAZODONE HYDROCHLORIDE 50 MG: 50 TABLET ORAL at 21:16

## 2019-09-15 RX ADMIN — DOCUSATE SODIUM 100 MG: 100 CAPSULE, LIQUID FILLED ORAL at 09:00

## 2019-09-15 RX ADMIN — POLYETHYLENE GLYCOL 3350 17 G: 17 POWDER, FOR SOLUTION ORAL at 09:00

## 2019-09-15 RX ADMIN — BUDESONIDE 0.5 MG: 0.5 INHALANT RESPIRATORY (INHALATION) at 19:06

## 2019-09-15 RX ADMIN — TAMSULOSIN HYDROCHLORIDE 0.8 MG: 0.4 CAPSULE ORAL at 09:00

## 2019-09-16 LAB
ANION GAP SERPL CALCULATED.3IONS-SCNC: 6 MMOL/L (ref 5–15)
BUN BLD-MCNC: 20 MG/DL (ref 8–23)
BUN/CREAT SERPL: 15 (ref 7–25)
CALCIUM SPEC-SCNC: 8.9 MG/DL (ref 8.6–10.5)
CHLORIDE SERPL-SCNC: 98 MMOL/L (ref 98–107)
CO2 SERPL-SCNC: 35 MMOL/L (ref 22–29)
CREAT BLD-MCNC: 1.33 MG/DL (ref 0.76–1.27)
DEPRECATED RDW RBC AUTO: 48.2 FL (ref 37–54)
ERYTHROCYTE [DISTWIDTH] IN BLOOD BY AUTOMATED COUNT: 13.4 % (ref 12.3–15.4)
GFR SERPL CREATININE-BSD FRML MDRD: 53 ML/MIN/1.73
GLUCOSE BLD-MCNC: 99 MG/DL (ref 65–99)
HCT VFR BLD AUTO: 34 % (ref 37.5–51)
HGB BLD-MCNC: 10.3 G/DL (ref 13–17.7)
MAGNESIUM SERPL-MCNC: 1.9 MG/DL (ref 1.6–2.4)
MCH RBC QN AUTO: 29.9 PG (ref 26.6–33)
MCHC RBC AUTO-ENTMCNC: 30.3 G/DL (ref 31.5–35.7)
MCV RBC AUTO: 98.6 FL (ref 79–97)
PLATELET # BLD AUTO: 242 10*3/MM3 (ref 140–450)
PMV BLD AUTO: 9.1 FL (ref 6–12)
POTASSIUM BLD-SCNC: 3.9 MMOL/L (ref 3.5–5.2)
RBC # BLD AUTO: 3.45 10*6/MM3 (ref 4.14–5.8)
SODIUM BLD-SCNC: 139 MMOL/L (ref 136–145)
WBC NRBC COR # BLD: 6.83 10*3/MM3 (ref 3.4–10.8)

## 2019-09-16 PROCEDURE — 92526 ORAL FUNCTION THERAPY: CPT

## 2019-09-16 PROCEDURE — 97112 NEUROMUSCULAR REEDUCATION: CPT

## 2019-09-16 PROCEDURE — 99232 SBSQ HOSP IP/OBS MODERATE 35: CPT | Performed by: INTERNAL MEDICINE

## 2019-09-16 PROCEDURE — 85027 COMPLETE CBC AUTOMATED: CPT | Performed by: INTERNAL MEDICINE

## 2019-09-16 PROCEDURE — 94799 UNLISTED PULMONARY SVC/PX: CPT

## 2019-09-16 PROCEDURE — 97110 THERAPEUTIC EXERCISES: CPT

## 2019-09-16 PROCEDURE — 80048 BASIC METABOLIC PNL TOTAL CA: CPT | Performed by: INTERNAL MEDICINE

## 2019-09-16 PROCEDURE — 25010000002 CEFTRIAXONE PER 250 MG: Performed by: HOSPITALIST

## 2019-09-16 PROCEDURE — 94660 CPAP INITIATION&MGMT: CPT

## 2019-09-16 PROCEDURE — 97116 GAIT TRAINING THERAPY: CPT

## 2019-09-16 PROCEDURE — 83735 ASSAY OF MAGNESIUM: CPT | Performed by: INTERNAL MEDICINE

## 2019-09-16 PROCEDURE — 63710000001 PREDNISONE PER 5 MG: Performed by: HOSPITALIST

## 2019-09-16 RX ADMIN — PREDNISONE 10 MG: 10 TABLET ORAL at 08:56

## 2019-09-16 RX ADMIN — BUDESONIDE 0.5 MG: 0.5 INHALANT RESPIRATORY (INHALATION) at 19:04

## 2019-09-16 RX ADMIN — BUDESONIDE 0.5 MG: 0.5 INHALANT RESPIRATORY (INHALATION) at 07:41

## 2019-09-16 RX ADMIN — CEFTRIAXONE 1 G: 1 INJECTION, POWDER, FOR SOLUTION INTRAMUSCULAR; INTRAVENOUS at 14:24

## 2019-09-16 RX ADMIN — LIDOCAINE HYDROCHLORIDE 2.1 ML: 10 INJECTION, SOLUTION EPIDURAL; INFILTRATION; INTRACAUDAL; PERINEURAL at 14:24

## 2019-09-16 RX ADMIN — DOCUSATE SODIUM 100 MG: 100 CAPSULE, LIQUID FILLED ORAL at 08:56

## 2019-09-16 RX ADMIN — FUROSEMIDE 20 MG: 20 TABLET ORAL at 08:56

## 2019-09-16 RX ADMIN — AMLODIPINE BESYLATE 5 MG: 5 TABLET ORAL at 08:56

## 2019-09-16 RX ADMIN — MELATONIN TAB 5 MG 5 MG: 5 TAB at 20:42

## 2019-09-16 RX ADMIN — IPRATROPIUM BROMIDE AND ALBUTEROL SULFATE 3 ML: 2.5; .5 SOLUTION RESPIRATORY (INHALATION) at 07:41

## 2019-09-16 RX ADMIN — TRAZODONE HYDROCHLORIDE 50 MG: 50 TABLET ORAL at 20:42

## 2019-09-16 RX ADMIN — METOPROLOL TARTRATE 25 MG: 25 TABLET ORAL at 20:42

## 2019-09-16 RX ADMIN — POLYETHYLENE GLYCOL 3350 17 G: 17 POWDER, FOR SOLUTION ORAL at 08:58

## 2019-09-16 RX ADMIN — IPRATROPIUM BROMIDE AND ALBUTEROL SULFATE 3 ML: 2.5; .5 SOLUTION RESPIRATORY (INHALATION) at 12:05

## 2019-09-16 RX ADMIN — METOPROLOL TARTRATE 25 MG: 25 TABLET ORAL at 08:56

## 2019-09-16 RX ADMIN — IPRATROPIUM BROMIDE AND ALBUTEROL SULFATE 3 ML: 2.5; .5 SOLUTION RESPIRATORY (INHALATION) at 19:04

## 2019-09-16 RX ADMIN — ACETAMINOPHEN 650 MG: 325 TABLET ORAL at 09:55

## 2019-09-16 RX ADMIN — ESCITALOPRAM OXALATE 20 MG: 20 TABLET ORAL at 08:56

## 2019-09-16 RX ADMIN — TAMSULOSIN HYDROCHLORIDE 0.8 MG: 0.4 CAPSULE ORAL at 08:55

## 2019-09-17 PROCEDURE — 99232 SBSQ HOSP IP/OBS MODERATE 35: CPT | Performed by: INTERNAL MEDICINE

## 2019-09-17 PROCEDURE — 25010000002 CEFTRIAXONE PER 250 MG: Performed by: HOSPITALIST

## 2019-09-17 PROCEDURE — 63710000001 PREDNISONE PER 5 MG: Performed by: HOSPITALIST

## 2019-09-17 PROCEDURE — 97110 THERAPEUTIC EXERCISES: CPT

## 2019-09-17 PROCEDURE — 94799 UNLISTED PULMONARY SVC/PX: CPT

## 2019-09-17 PROCEDURE — 94660 CPAP INITIATION&MGMT: CPT

## 2019-09-17 PROCEDURE — 97116 GAIT TRAINING THERAPY: CPT

## 2019-09-17 RX ORDER — LISINOPRIL 5 MG/1
5 TABLET ORAL DAILY
Status: DISCONTINUED | OUTPATIENT
Start: 2019-09-17 | End: 2019-09-26 | Stop reason: HOSPADM

## 2019-09-17 RX ADMIN — AMLODIPINE BESYLATE 5 MG: 5 TABLET ORAL at 08:49

## 2019-09-17 RX ADMIN — QUETIAPINE FUMARATE 12.5 MG: 25 TABLET ORAL at 11:15

## 2019-09-17 RX ADMIN — IPRATROPIUM BROMIDE AND ALBUTEROL SULFATE 3 ML: 2.5; .5 SOLUTION RESPIRATORY (INHALATION) at 12:34

## 2019-09-17 RX ADMIN — MELATONIN TAB 5 MG 5 MG: 5 TAB at 21:12

## 2019-09-17 RX ADMIN — IPRATROPIUM BROMIDE AND ALBUTEROL SULFATE 3 ML: 2.5; .5 SOLUTION RESPIRATORY (INHALATION) at 19:21

## 2019-09-17 RX ADMIN — TRAZODONE HYDROCHLORIDE 50 MG: 50 TABLET ORAL at 21:12

## 2019-09-17 RX ADMIN — METOPROLOL TARTRATE 25 MG: 25 TABLET ORAL at 08:49

## 2019-09-17 RX ADMIN — BUDESONIDE 0.5 MG: 0.5 INHALANT RESPIRATORY (INHALATION) at 19:21

## 2019-09-17 RX ADMIN — ACETAMINOPHEN 650 MG: 325 TABLET ORAL at 11:15

## 2019-09-17 RX ADMIN — ESCITALOPRAM OXALATE 20 MG: 20 TABLET ORAL at 08:50

## 2019-09-17 RX ADMIN — IPRATROPIUM BROMIDE AND ALBUTEROL SULFATE 3 ML: 2.5; .5 SOLUTION RESPIRATORY (INHALATION) at 06:24

## 2019-09-17 RX ADMIN — BUDESONIDE 0.5 MG: 0.5 INHALANT RESPIRATORY (INHALATION) at 06:24

## 2019-09-17 RX ADMIN — METOPROLOL TARTRATE 25 MG: 25 TABLET ORAL at 21:12

## 2019-09-17 RX ADMIN — IPRATROPIUM BROMIDE AND ALBUTEROL SULFATE 3 ML: 2.5; .5 SOLUTION RESPIRATORY (INHALATION) at 23:39

## 2019-09-17 RX ADMIN — PREDNISONE 10 MG: 10 TABLET ORAL at 08:49

## 2019-09-17 RX ADMIN — LISINOPRIL 5 MG: 5 TABLET ORAL at 13:49

## 2019-09-17 RX ADMIN — FUROSEMIDE 20 MG: 20 TABLET ORAL at 08:49

## 2019-09-17 RX ADMIN — DOCUSATE SODIUM 100 MG: 100 CAPSULE, LIQUID FILLED ORAL at 08:49

## 2019-09-17 RX ADMIN — IPRATROPIUM BROMIDE AND ALBUTEROL SULFATE 3 ML: 2.5; .5 SOLUTION RESPIRATORY (INHALATION) at 00:03

## 2019-09-17 RX ADMIN — CEFTRIAXONE 1 G: 1 INJECTION, POWDER, FOR SOLUTION INTRAMUSCULAR; INTRAVENOUS at 12:24

## 2019-09-17 RX ADMIN — TAMSULOSIN HYDROCHLORIDE 0.8 MG: 0.4 CAPSULE ORAL at 08:49

## 2019-09-17 RX ADMIN — LIDOCAINE HYDROCHLORIDE 2.1 ML: 10 INJECTION, SOLUTION EPIDURAL; INFILTRATION; INTRACAUDAL; PERINEURAL at 12:24

## 2019-09-18 PROCEDURE — 63710000001 PREDNISONE PER 5 MG: Performed by: HOSPITALIST

## 2019-09-18 PROCEDURE — 97535 SELF CARE MNGMENT TRAINING: CPT | Performed by: OCCUPATIONAL THERAPIST

## 2019-09-18 PROCEDURE — 94660 CPAP INITIATION&MGMT: CPT

## 2019-09-18 PROCEDURE — 94799 UNLISTED PULMONARY SVC/PX: CPT

## 2019-09-18 PROCEDURE — 92526 ORAL FUNCTION THERAPY: CPT

## 2019-09-18 PROCEDURE — 99231 SBSQ HOSP IP/OBS SF/LOW 25: CPT | Performed by: PHYSICIAN ASSISTANT

## 2019-09-18 RX ADMIN — BUDESONIDE 0.5 MG: 0.5 INHALANT RESPIRATORY (INHALATION) at 18:33

## 2019-09-18 RX ADMIN — BUDESONIDE 0.5 MG: 0.5 INHALANT RESPIRATORY (INHALATION) at 07:36

## 2019-09-18 RX ADMIN — AMLODIPINE BESYLATE 5 MG: 5 TABLET ORAL at 09:10

## 2019-09-18 RX ADMIN — PREDNISONE 10 MG: 10 TABLET ORAL at 09:02

## 2019-09-18 RX ADMIN — POLYETHYLENE GLYCOL 3350 17 G: 17 POWDER, FOR SOLUTION ORAL at 09:02

## 2019-09-18 RX ADMIN — ESCITALOPRAM OXALATE 20 MG: 20 TABLET ORAL at 09:02

## 2019-09-18 RX ADMIN — MELATONIN TAB 5 MG 5 MG: 5 TAB at 20:50

## 2019-09-18 RX ADMIN — METOPROLOL TARTRATE 25 MG: 25 TABLET ORAL at 09:10

## 2019-09-18 RX ADMIN — IPRATROPIUM BROMIDE AND ALBUTEROL SULFATE 3 ML: 2.5; .5 SOLUTION RESPIRATORY (INHALATION) at 18:33

## 2019-09-18 RX ADMIN — IPRATROPIUM BROMIDE AND ALBUTEROL SULFATE 3 ML: 2.5; .5 SOLUTION RESPIRATORY (INHALATION) at 12:43

## 2019-09-18 RX ADMIN — QUETIAPINE FUMARATE 12.5 MG: 25 TABLET ORAL at 10:18

## 2019-09-18 RX ADMIN — IPRATROPIUM BROMIDE AND ALBUTEROL SULFATE 3 ML: 2.5; .5 SOLUTION RESPIRATORY (INHALATION) at 23:33

## 2019-09-18 RX ADMIN — ACETAMINOPHEN 650 MG: 325 TABLET ORAL at 22:48

## 2019-09-18 RX ADMIN — ACETAMINOPHEN 650 MG: 325 TABLET ORAL at 01:47

## 2019-09-18 RX ADMIN — METOPROLOL TARTRATE 25 MG: 25 TABLET ORAL at 20:50

## 2019-09-18 RX ADMIN — TAMSULOSIN HYDROCHLORIDE 0.8 MG: 0.4 CAPSULE ORAL at 09:02

## 2019-09-18 RX ADMIN — IPRATROPIUM BROMIDE AND ALBUTEROL SULFATE 3 ML: 2.5; .5 SOLUTION RESPIRATORY (INHALATION) at 07:36

## 2019-09-18 RX ADMIN — FUROSEMIDE 20 MG: 20 TABLET ORAL at 09:10

## 2019-09-18 RX ADMIN — LISINOPRIL 5 MG: 5 TABLET ORAL at 09:10

## 2019-09-18 RX ADMIN — DOCUSATE SODIUM 100 MG: 100 CAPSULE, LIQUID FILLED ORAL at 09:02

## 2019-09-18 RX ADMIN — TRAZODONE HYDROCHLORIDE 50 MG: 50 TABLET ORAL at 20:50

## 2019-09-19 LAB
ANION GAP SERPL CALCULATED.3IONS-SCNC: 7 MMOL/L (ref 5–15)
BUN BLD-MCNC: 30 MG/DL (ref 8–23)
BUN/CREAT SERPL: 21.4 (ref 7–25)
CALCIUM SPEC-SCNC: 8.7 MG/DL (ref 8.6–10.5)
CHLORIDE SERPL-SCNC: 97 MMOL/L (ref 98–107)
CO2 SERPL-SCNC: 35 MMOL/L (ref 22–29)
CREAT BLD-MCNC: 1.4 MG/DL (ref 0.76–1.27)
GFR SERPL CREATININE-BSD FRML MDRD: 50 ML/MIN/1.73
GLUCOSE BLD-MCNC: 108 MG/DL (ref 65–99)
POTASSIUM BLD-SCNC: 3.9 MMOL/L (ref 3.5–5.2)
SODIUM BLD-SCNC: 139 MMOL/L (ref 136–145)

## 2019-09-19 PROCEDURE — 97110 THERAPEUTIC EXERCISES: CPT

## 2019-09-19 PROCEDURE — 99232 SBSQ HOSP IP/OBS MODERATE 35: CPT | Performed by: INTERNAL MEDICINE

## 2019-09-19 PROCEDURE — 94799 UNLISTED PULMONARY SVC/PX: CPT

## 2019-09-19 PROCEDURE — 63710000001 PREDNISONE PER 5 MG: Performed by: HOSPITALIST

## 2019-09-19 PROCEDURE — 97116 GAIT TRAINING THERAPY: CPT

## 2019-09-19 PROCEDURE — 80048 BASIC METABOLIC PNL TOTAL CA: CPT | Performed by: INTERNAL MEDICINE

## 2019-09-19 RX ADMIN — IPRATROPIUM BROMIDE AND ALBUTEROL SULFATE 3 ML: 2.5; .5 SOLUTION RESPIRATORY (INHALATION) at 07:19

## 2019-09-19 RX ADMIN — ACETAMINOPHEN 650 MG: 325 TABLET ORAL at 23:53

## 2019-09-19 RX ADMIN — DOCUSATE SODIUM 100 MG: 100 CAPSULE, LIQUID FILLED ORAL at 08:21

## 2019-09-19 RX ADMIN — AMLODIPINE BESYLATE 5 MG: 5 TABLET ORAL at 08:21

## 2019-09-19 RX ADMIN — BUDESONIDE 0.5 MG: 0.5 INHALANT RESPIRATORY (INHALATION) at 07:18

## 2019-09-19 RX ADMIN — LISINOPRIL 5 MG: 5 TABLET ORAL at 08:21

## 2019-09-19 RX ADMIN — MELATONIN TAB 5 MG 5 MG: 5 TAB at 20:49

## 2019-09-19 RX ADMIN — ACETAMINOPHEN 650 MG: 325 TABLET ORAL at 09:01

## 2019-09-19 RX ADMIN — TAMSULOSIN HYDROCHLORIDE 0.8 MG: 0.4 CAPSULE ORAL at 08:21

## 2019-09-19 RX ADMIN — TRAZODONE HYDROCHLORIDE 50 MG: 50 TABLET ORAL at 20:49

## 2019-09-19 RX ADMIN — POLYETHYLENE GLYCOL 3350 17 G: 17 POWDER, FOR SOLUTION ORAL at 08:20

## 2019-09-19 RX ADMIN — ESCITALOPRAM OXALATE 20 MG: 20 TABLET ORAL at 08:20

## 2019-09-19 RX ADMIN — IPRATROPIUM BROMIDE AND ALBUTEROL SULFATE 3 ML: 2.5; .5 SOLUTION RESPIRATORY (INHALATION) at 13:33

## 2019-09-19 RX ADMIN — FUROSEMIDE 20 MG: 20 TABLET ORAL at 08:21

## 2019-09-19 RX ADMIN — METOPROLOL TARTRATE 25 MG: 25 TABLET ORAL at 08:20

## 2019-09-19 RX ADMIN — IPRATROPIUM BROMIDE AND ALBUTEROL SULFATE 3 ML: 2.5; .5 SOLUTION RESPIRATORY (INHALATION) at 23:51

## 2019-09-19 RX ADMIN — PREDNISONE 10 MG: 10 TABLET ORAL at 10:39

## 2019-09-19 RX ADMIN — BUDESONIDE 0.5 MG: 0.5 INHALANT RESPIRATORY (INHALATION) at 23:51

## 2019-09-19 RX ADMIN — IPRATROPIUM BROMIDE AND ALBUTEROL SULFATE 3 ML: 2.5; .5 SOLUTION RESPIRATORY (INHALATION) at 19:57

## 2019-09-20 PROCEDURE — 94660 CPAP INITIATION&MGMT: CPT

## 2019-09-20 PROCEDURE — 94799 UNLISTED PULMONARY SVC/PX: CPT

## 2019-09-20 PROCEDURE — 63710000001 PREDNISONE PER 5 MG: Performed by: HOSPITALIST

## 2019-09-20 PROCEDURE — 99232 SBSQ HOSP IP/OBS MODERATE 35: CPT | Performed by: INTERNAL MEDICINE

## 2019-09-20 PROCEDURE — 92526 ORAL FUNCTION THERAPY: CPT

## 2019-09-20 RX ADMIN — DOCUSATE SODIUM 100 MG: 100 CAPSULE, LIQUID FILLED ORAL at 08:13

## 2019-09-20 RX ADMIN — PREDNISONE 10 MG: 10 TABLET ORAL at 08:12

## 2019-09-20 RX ADMIN — POLYETHYLENE GLYCOL 3350 17 G: 17 POWDER, FOR SOLUTION ORAL at 08:12

## 2019-09-20 RX ADMIN — METOPROLOL TARTRATE 25 MG: 25 TABLET ORAL at 08:12

## 2019-09-20 RX ADMIN — FUROSEMIDE 20 MG: 20 TABLET ORAL at 08:12

## 2019-09-20 RX ADMIN — IPRATROPIUM BROMIDE AND ALBUTEROL SULFATE 3 ML: 2.5; .5 SOLUTION RESPIRATORY (INHALATION) at 18:48

## 2019-09-20 RX ADMIN — LISINOPRIL 5 MG: 5 TABLET ORAL at 08:12

## 2019-09-20 RX ADMIN — BUDESONIDE 0.5 MG: 0.5 INHALANT RESPIRATORY (INHALATION) at 18:48

## 2019-09-20 RX ADMIN — IPRATROPIUM BROMIDE AND ALBUTEROL SULFATE 3 ML: 2.5; .5 SOLUTION RESPIRATORY (INHALATION) at 07:54

## 2019-09-20 RX ADMIN — TAMSULOSIN HYDROCHLORIDE 0.8 MG: 0.4 CAPSULE ORAL at 08:12

## 2019-09-20 RX ADMIN — TRAZODONE HYDROCHLORIDE 50 MG: 50 TABLET ORAL at 20:50

## 2019-09-20 RX ADMIN — ONDANSETRON HYDROCHLORIDE 4 MG: 4 TABLET, FILM COATED ORAL at 03:36

## 2019-09-20 RX ADMIN — BUDESONIDE 0.5 MG: 0.5 INHALANT RESPIRATORY (INHALATION) at 07:57

## 2019-09-20 RX ADMIN — ESCITALOPRAM OXALATE 20 MG: 20 TABLET ORAL at 08:13

## 2019-09-20 RX ADMIN — METOPROLOL TARTRATE 25 MG: 25 TABLET ORAL at 20:53

## 2019-09-20 RX ADMIN — AMLODIPINE BESYLATE 5 MG: 5 TABLET ORAL at 08:13

## 2019-09-20 RX ADMIN — ACETAMINOPHEN 650 MG: 325 TABLET ORAL at 19:29

## 2019-09-20 RX ADMIN — MELATONIN TAB 5 MG 5 MG: 5 TAB at 20:50

## 2019-09-21 PROCEDURE — 99232 SBSQ HOSP IP/OBS MODERATE 35: CPT | Performed by: INTERNAL MEDICINE

## 2019-09-21 PROCEDURE — 63710000001 PREDNISONE PER 5 MG: Performed by: HOSPITALIST

## 2019-09-21 PROCEDURE — 94799 UNLISTED PULMONARY SVC/PX: CPT

## 2019-09-21 RX ADMIN — PREDNISONE 10 MG: 10 TABLET ORAL at 08:28

## 2019-09-21 RX ADMIN — TAMSULOSIN HYDROCHLORIDE 0.8 MG: 0.4 CAPSULE ORAL at 08:28

## 2019-09-21 RX ADMIN — BUDESONIDE 0.5 MG: 0.5 INHALANT RESPIRATORY (INHALATION) at 07:28

## 2019-09-21 RX ADMIN — METOPROLOL TARTRATE 25 MG: 25 TABLET ORAL at 08:28

## 2019-09-21 RX ADMIN — BUDESONIDE 0.5 MG: 0.5 INHALANT RESPIRATORY (INHALATION) at 19:37

## 2019-09-21 RX ADMIN — IPRATROPIUM BROMIDE AND ALBUTEROL SULFATE 3 ML: 2.5; .5 SOLUTION RESPIRATORY (INHALATION) at 19:37

## 2019-09-21 RX ADMIN — LISINOPRIL 5 MG: 5 TABLET ORAL at 08:28

## 2019-09-21 RX ADMIN — DOCUSATE SODIUM 100 MG: 100 CAPSULE, LIQUID FILLED ORAL at 08:28

## 2019-09-21 RX ADMIN — AMLODIPINE BESYLATE 5 MG: 5 TABLET ORAL at 08:28

## 2019-09-21 RX ADMIN — IPRATROPIUM BROMIDE AND ALBUTEROL SULFATE 3 ML: 2.5; .5 SOLUTION RESPIRATORY (INHALATION) at 12:09

## 2019-09-21 RX ADMIN — MELATONIN TAB 5 MG 5 MG: 5 TAB at 21:29

## 2019-09-21 RX ADMIN — IPRATROPIUM BROMIDE AND ALBUTEROL SULFATE 3 ML: 2.5; .5 SOLUTION RESPIRATORY (INHALATION) at 07:28

## 2019-09-21 RX ADMIN — ESCITALOPRAM OXALATE 20 MG: 20 TABLET ORAL at 08:28

## 2019-09-21 RX ADMIN — FUROSEMIDE 20 MG: 20 TABLET ORAL at 08:28

## 2019-09-22 LAB
ANION GAP SERPL CALCULATED.3IONS-SCNC: 6 MMOL/L (ref 5–15)
BUN BLD-MCNC: 21 MG/DL (ref 8–23)
BUN/CREAT SERPL: 16.2 (ref 7–25)
CALCIUM SPEC-SCNC: 8.7 MG/DL (ref 8.6–10.5)
CHLORIDE SERPL-SCNC: 100 MMOL/L (ref 98–107)
CO2 SERPL-SCNC: 35 MMOL/L (ref 22–29)
CREAT BLD-MCNC: 1.3 MG/DL (ref 0.76–1.27)
GFR SERPL CREATININE-BSD FRML MDRD: 55 ML/MIN/1.73
GLUCOSE BLD-MCNC: 89 MG/DL (ref 65–99)
POTASSIUM BLD-SCNC: 3.8 MMOL/L (ref 3.5–5.2)
SODIUM BLD-SCNC: 141 MMOL/L (ref 136–145)

## 2019-09-22 PROCEDURE — 94799 UNLISTED PULMONARY SVC/PX: CPT

## 2019-09-22 PROCEDURE — 99232 SBSQ HOSP IP/OBS MODERATE 35: CPT | Performed by: NURSE PRACTITIONER

## 2019-09-22 PROCEDURE — 63710000001 PREDNISONE PER 5 MG: Performed by: HOSPITALIST

## 2019-09-22 PROCEDURE — 97110 THERAPEUTIC EXERCISES: CPT

## 2019-09-22 PROCEDURE — 80048 BASIC METABOLIC PNL TOTAL CA: CPT | Performed by: INTERNAL MEDICINE

## 2019-09-22 PROCEDURE — 97116 GAIT TRAINING THERAPY: CPT

## 2019-09-22 RX ADMIN — LISINOPRIL 5 MG: 5 TABLET ORAL at 09:37

## 2019-09-22 RX ADMIN — ACETAMINOPHEN 650 MG: 325 TABLET ORAL at 10:08

## 2019-09-22 RX ADMIN — POLYETHYLENE GLYCOL 3350 17 G: 17 POWDER, FOR SOLUTION ORAL at 09:38

## 2019-09-22 RX ADMIN — MELATONIN TAB 5 MG 5 MG: 5 TAB at 20:45

## 2019-09-22 RX ADMIN — PREDNISONE 10 MG: 10 TABLET ORAL at 09:37

## 2019-09-22 RX ADMIN — IPRATROPIUM BROMIDE AND ALBUTEROL SULFATE 3 ML: 2.5; .5 SOLUTION RESPIRATORY (INHALATION) at 13:21

## 2019-09-22 RX ADMIN — IPRATROPIUM BROMIDE AND ALBUTEROL SULFATE 3 ML: 2.5; .5 SOLUTION RESPIRATORY (INHALATION) at 00:35

## 2019-09-22 RX ADMIN — METOPROLOL TARTRATE 25 MG: 25 TABLET ORAL at 09:43

## 2019-09-22 RX ADMIN — IPRATROPIUM BROMIDE AND ALBUTEROL SULFATE 3 ML: 2.5; .5 SOLUTION RESPIRATORY (INHALATION) at 07:55

## 2019-09-22 RX ADMIN — FUROSEMIDE 20 MG: 20 TABLET ORAL at 09:38

## 2019-09-22 RX ADMIN — ESCITALOPRAM OXALATE 20 MG: 20 TABLET ORAL at 09:37

## 2019-09-22 RX ADMIN — ACETAMINOPHEN 650 MG: 325 TABLET ORAL at 04:50

## 2019-09-22 RX ADMIN — AMLODIPINE BESYLATE 5 MG: 5 TABLET ORAL at 09:37

## 2019-09-22 RX ADMIN — TRAZODONE HYDROCHLORIDE 50 MG: 50 TABLET ORAL at 20:45

## 2019-09-22 RX ADMIN — BUDESONIDE 0.5 MG: 0.5 INHALANT RESPIRATORY (INHALATION) at 07:59

## 2019-09-22 RX ADMIN — TAMSULOSIN HYDROCHLORIDE 0.8 MG: 0.4 CAPSULE ORAL at 09:38

## 2019-09-22 RX ADMIN — IPRATROPIUM BROMIDE AND ALBUTEROL SULFATE 3 ML: 2.5; .5 SOLUTION RESPIRATORY (INHALATION) at 19:37

## 2019-09-22 RX ADMIN — DOCUSATE SODIUM 100 MG: 100 CAPSULE, LIQUID FILLED ORAL at 09:36

## 2019-09-22 RX ADMIN — METOPROLOL TARTRATE 25 MG: 25 TABLET ORAL at 20:45

## 2019-09-23 PROCEDURE — 94799 UNLISTED PULMONARY SVC/PX: CPT

## 2019-09-23 PROCEDURE — 97116 GAIT TRAINING THERAPY: CPT

## 2019-09-23 PROCEDURE — 97110 THERAPEUTIC EXERCISES: CPT

## 2019-09-23 PROCEDURE — 63710000001 PREDNISONE PER 5 MG: Performed by: HOSPITALIST

## 2019-09-23 PROCEDURE — 97535 SELF CARE MNGMENT TRAINING: CPT

## 2019-09-23 PROCEDURE — 99231 SBSQ HOSP IP/OBS SF/LOW 25: CPT | Performed by: PHYSICIAN ASSISTANT

## 2019-09-23 PROCEDURE — 92526 ORAL FUNCTION THERAPY: CPT

## 2019-09-23 RX ADMIN — PREDNISONE 10 MG: 10 TABLET ORAL at 08:42

## 2019-09-23 RX ADMIN — BUDESONIDE 0.5 MG: 0.5 INHALANT RESPIRATORY (INHALATION) at 07:20

## 2019-09-23 RX ADMIN — IPRATROPIUM BROMIDE AND ALBUTEROL SULFATE 3 ML: 2.5; .5 SOLUTION RESPIRATORY (INHALATION) at 18:41

## 2019-09-23 RX ADMIN — IPRATROPIUM BROMIDE AND ALBUTEROL SULFATE 3 ML: 2.5; .5 SOLUTION RESPIRATORY (INHALATION) at 00:39

## 2019-09-23 RX ADMIN — AMLODIPINE BESYLATE 5 MG: 5 TABLET ORAL at 08:42

## 2019-09-23 RX ADMIN — FUROSEMIDE 20 MG: 20 TABLET ORAL at 08:42

## 2019-09-23 RX ADMIN — ESCITALOPRAM OXALATE 20 MG: 20 TABLET ORAL at 08:42

## 2019-09-23 RX ADMIN — IPRATROPIUM BROMIDE AND ALBUTEROL SULFATE 3 ML: 2.5; .5 SOLUTION RESPIRATORY (INHALATION) at 12:30

## 2019-09-23 RX ADMIN — MELATONIN TAB 5 MG 5 MG: 5 TAB at 20:49

## 2019-09-23 RX ADMIN — IPRATROPIUM BROMIDE AND ALBUTEROL SULFATE 3 ML: 2.5; .5 SOLUTION RESPIRATORY (INHALATION) at 07:20

## 2019-09-23 RX ADMIN — METOPROLOL TARTRATE 25 MG: 25 TABLET ORAL at 08:43

## 2019-09-23 RX ADMIN — POLYVINYL ALCOHOL 1 DROP: 14 SOLUTION/ DROPS OPHTHALMIC at 08:43

## 2019-09-23 RX ADMIN — METOPROLOL TARTRATE 25 MG: 25 TABLET ORAL at 20:49

## 2019-09-23 RX ADMIN — BUDESONIDE 0.5 MG: 0.5 INHALANT RESPIRATORY (INHALATION) at 18:41

## 2019-09-23 RX ADMIN — LISINOPRIL 5 MG: 5 TABLET ORAL at 08:43

## 2019-09-23 RX ADMIN — TAMSULOSIN HYDROCHLORIDE 0.8 MG: 0.4 CAPSULE ORAL at 08:42

## 2019-09-23 RX ADMIN — TRAZODONE HYDROCHLORIDE 50 MG: 50 TABLET ORAL at 20:49

## 2019-09-24 PROCEDURE — 99231 SBSQ HOSP IP/OBS SF/LOW 25: CPT | Performed by: PHYSICIAN ASSISTANT

## 2019-09-24 PROCEDURE — 63710000001 PREDNISONE PER 5 MG: Performed by: HOSPITALIST

## 2019-09-24 PROCEDURE — 94799 UNLISTED PULMONARY SVC/PX: CPT

## 2019-09-24 PROCEDURE — 94660 CPAP INITIATION&MGMT: CPT

## 2019-09-24 RX ADMIN — DOCUSATE SODIUM 100 MG: 100 CAPSULE, LIQUID FILLED ORAL at 08:25

## 2019-09-24 RX ADMIN — IPRATROPIUM BROMIDE AND ALBUTEROL SULFATE 3 ML: 2.5; .5 SOLUTION RESPIRATORY (INHALATION) at 14:28

## 2019-09-24 RX ADMIN — PREDNISONE 10 MG: 10 TABLET ORAL at 08:25

## 2019-09-24 RX ADMIN — IPRATROPIUM BROMIDE AND ALBUTEROL SULFATE 3 ML: 2.5; .5 SOLUTION RESPIRATORY (INHALATION) at 18:18

## 2019-09-24 RX ADMIN — LISINOPRIL 5 MG: 5 TABLET ORAL at 08:26

## 2019-09-24 RX ADMIN — MELATONIN TAB 5 MG 5 MG: 5 TAB at 20:54

## 2019-09-24 RX ADMIN — BUDESONIDE 0.5 MG: 0.5 INHALANT RESPIRATORY (INHALATION) at 08:35

## 2019-09-24 RX ADMIN — FUROSEMIDE 20 MG: 20 TABLET ORAL at 08:26

## 2019-09-24 RX ADMIN — IPRATROPIUM BROMIDE AND ALBUTEROL SULFATE 3 ML: 2.5; .5 SOLUTION RESPIRATORY (INHALATION) at 00:01

## 2019-09-24 RX ADMIN — ESCITALOPRAM OXALATE 20 MG: 20 TABLET ORAL at 08:26

## 2019-09-24 RX ADMIN — TRAZODONE HYDROCHLORIDE 50 MG: 50 TABLET ORAL at 20:54

## 2019-09-24 RX ADMIN — BUDESONIDE 0.5 MG: 0.5 INHALANT RESPIRATORY (INHALATION) at 18:18

## 2019-09-24 RX ADMIN — AMLODIPINE BESYLATE 5 MG: 5 TABLET ORAL at 08:26

## 2019-09-24 RX ADMIN — METOPROLOL TARTRATE 25 MG: 25 TABLET ORAL at 08:26

## 2019-09-24 RX ADMIN — TAMSULOSIN HYDROCHLORIDE 0.8 MG: 0.4 CAPSULE ORAL at 08:26

## 2019-09-24 RX ADMIN — METOPROLOL TARTRATE 25 MG: 25 TABLET ORAL at 20:54

## 2019-09-24 RX ADMIN — IPRATROPIUM BROMIDE AND ALBUTEROL SULFATE 3 ML: 2.5; .5 SOLUTION RESPIRATORY (INHALATION) at 08:34

## 2019-09-25 PROCEDURE — 94799 UNLISTED PULMONARY SVC/PX: CPT

## 2019-09-25 PROCEDURE — 63710000001 PREDNISONE PER 5 MG: Performed by: HOSPITALIST

## 2019-09-25 PROCEDURE — 92526 ORAL FUNCTION THERAPY: CPT

## 2019-09-25 PROCEDURE — 99231 SBSQ HOSP IP/OBS SF/LOW 25: CPT | Performed by: PHYSICIAN ASSISTANT

## 2019-09-25 PROCEDURE — 97535 SELF CARE MNGMENT TRAINING: CPT | Performed by: OCCUPATIONAL THERAPIST

## 2019-09-25 RX ADMIN — ACETAMINOPHEN 650 MG: 325 TABLET ORAL at 09:05

## 2019-09-25 RX ADMIN — METOPROLOL TARTRATE 25 MG: 25 TABLET ORAL at 22:01

## 2019-09-25 RX ADMIN — DOCUSATE SODIUM 100 MG: 100 CAPSULE, LIQUID FILLED ORAL at 08:29

## 2019-09-25 RX ADMIN — FUROSEMIDE 20 MG: 20 TABLET ORAL at 08:29

## 2019-09-25 RX ADMIN — METOPROLOL TARTRATE 25 MG: 25 TABLET ORAL at 08:29

## 2019-09-25 RX ADMIN — IPRATROPIUM BROMIDE AND ALBUTEROL SULFATE 3 ML: 2.5; .5 SOLUTION RESPIRATORY (INHALATION) at 19:06

## 2019-09-25 RX ADMIN — BUDESONIDE 0.5 MG: 0.5 INHALANT RESPIRATORY (INHALATION) at 06:09

## 2019-09-25 RX ADMIN — LISINOPRIL 5 MG: 5 TABLET ORAL at 08:29

## 2019-09-25 RX ADMIN — IPRATROPIUM BROMIDE AND ALBUTEROL SULFATE 3 ML: 2.5; .5 SOLUTION RESPIRATORY (INHALATION) at 11:58

## 2019-09-25 RX ADMIN — PREDNISONE 10 MG: 10 TABLET ORAL at 08:29

## 2019-09-25 RX ADMIN — ESCITALOPRAM OXALATE 20 MG: 20 TABLET ORAL at 08:29

## 2019-09-25 RX ADMIN — IPRATROPIUM BROMIDE AND ALBUTEROL SULFATE 3 ML: 2.5; .5 SOLUTION RESPIRATORY (INHALATION) at 06:09

## 2019-09-25 RX ADMIN — IPRATROPIUM BROMIDE AND ALBUTEROL SULFATE 3 ML: 2.5; .5 SOLUTION RESPIRATORY (INHALATION) at 00:00

## 2019-09-25 RX ADMIN — TAMSULOSIN HYDROCHLORIDE 0.8 MG: 0.4 CAPSULE ORAL at 08:29

## 2019-09-25 RX ADMIN — AMLODIPINE BESYLATE 5 MG: 5 TABLET ORAL at 08:29

## 2019-09-25 RX ADMIN — MELATONIN TAB 5 MG 5 MG: 5 TAB at 22:00

## 2019-09-25 RX ADMIN — BUDESONIDE 0.5 MG: 0.5 INHALANT RESPIRATORY (INHALATION) at 19:06

## 2019-09-25 RX ADMIN — TRAZODONE HYDROCHLORIDE 50 MG: 50 TABLET ORAL at 22:01

## 2019-09-26 VITALS
HEIGHT: 67 IN | SYSTOLIC BLOOD PRESSURE: 111 MMHG | HEART RATE: 68 BPM | DIASTOLIC BLOOD PRESSURE: 66 MMHG | BODY MASS INDEX: 24.8 KG/M2 | OXYGEN SATURATION: 95 % | RESPIRATION RATE: 18 BRPM | TEMPERATURE: 98 F | WEIGHT: 158 LBS

## 2019-09-26 PROBLEM — G93.40 ACUTE ENCEPHALOPATHY: Status: RESOLVED | Noted: 2019-08-31 | Resolved: 2019-09-26

## 2019-09-26 PROBLEM — R91.8 INFILTRATE OF LOWER LOBE OF RIGHT LUNG PRESENT ON IMAGING STUDY: Status: RESOLVED | Noted: 2019-09-12 | Resolved: 2019-09-26

## 2019-09-26 PROBLEM — N17.9 ACUTE RENAL FAILURE (ARF) (HCC): Status: RESOLVED | Noted: 2019-08-29 | Resolved: 2019-09-26

## 2019-09-26 PROBLEM — J96.11 CHRONIC RESPIRATORY FAILURE WITH HYPOXIA AND HYPERCAPNIA (HCC): Status: ACTIVE | Noted: 2019-08-29

## 2019-09-26 PROBLEM — N18.30 ACUTE RENAL FAILURE SUPERIMPOSED ON STAGE 3 CHRONIC KIDNEY DISEASE (HCC): Status: RESOLVED | Noted: 2019-08-31 | Resolved: 2019-09-26

## 2019-09-26 PROBLEM — R58 RETROPERITONEAL HEMORRHAGE: Status: RESOLVED | Noted: 2019-09-12 | Resolved: 2019-09-26

## 2019-09-26 PROBLEM — N17.9 ACUTE RENAL FAILURE SUPERIMPOSED ON STAGE 3 CHRONIC KIDNEY DISEASE (HCC): Status: RESOLVED | Noted: 2019-08-31 | Resolved: 2019-09-26

## 2019-09-26 PROBLEM — T50.902A: Status: RESOLVED | Noted: 2019-09-02 | Resolved: 2019-09-26

## 2019-09-26 PROBLEM — G93.41 ACUTE METABOLIC ENCEPHALOPATHY: Status: RESOLVED | Noted: 2019-08-29 | Resolved: 2019-09-26

## 2019-09-26 PROBLEM — R04.2 COUGH WITH HEMOPTYSIS: Status: RESOLVED | Noted: 2019-09-12 | Resolved: 2019-09-26

## 2019-09-26 PROBLEM — A49.8 ESCHERICHIA COLI INFECTION: Status: RESOLVED | Noted: 2019-09-12 | Resolved: 2019-09-26

## 2019-09-26 PROBLEM — N39.0 COMPLICATED UTI (URINARY TRACT INFECTION): Status: RESOLVED | Noted: 2019-08-31 | Resolved: 2019-09-26

## 2019-09-26 PROCEDURE — 94799 UNLISTED PULMONARY SVC/PX: CPT

## 2019-09-26 PROCEDURE — 63710000001 PREDNISONE PER 5 MG: Performed by: HOSPITALIST

## 2019-09-26 PROCEDURE — 99239 HOSP IP/OBS DSCHRG MGMT >30: CPT | Performed by: PHYSICIAN ASSISTANT

## 2019-09-26 RX ORDER — CHOLECALCIFEROL (VITAMIN D3) 125 MCG
5 CAPSULE ORAL NIGHTLY
Start: 2019-09-26 | End: 2019-11-04

## 2019-09-26 RX ORDER — TRAZODONE HYDROCHLORIDE 50 MG/1
50 TABLET ORAL NIGHTLY
Start: 2019-09-26 | End: 2019-12-28 | Stop reason: HOSPADM

## 2019-09-26 RX ORDER — FUROSEMIDE 20 MG/1
20 TABLET ORAL DAILY
Start: 2019-09-27 | End: 2020-01-08 | Stop reason: SDUPTHER

## 2019-09-26 RX ORDER — LISINOPRIL 5 MG/1
5 TABLET ORAL DAILY
Start: 2019-09-27 | End: 2020-01-08 | Stop reason: SDUPTHER

## 2019-09-26 RX ORDER — FINASTERIDE 5 MG/1
5 TABLET, FILM COATED ORAL DAILY
Start: 2019-09-26 | End: 2020-01-08 | Stop reason: SDUPTHER

## 2019-09-26 RX ORDER — ACETAMINOPHEN 325 MG/1
650 TABLET ORAL EVERY 4 HOURS PRN
Status: ON HOLD
Start: 2019-09-26 | End: 2019-11-10

## 2019-09-26 RX ORDER — ONDANSETRON 4 MG/1
4 TABLET, FILM COATED ORAL EVERY 6 HOURS PRN
Start: 2019-09-26 | End: 2019-11-06

## 2019-09-26 RX ORDER — QUETIAPINE FUMARATE 25 MG/1
12.5 TABLET, FILM COATED ORAL 3 TIMES DAILY PRN
Status: ON HOLD
Start: 2019-09-26 | End: 2019-11-14 | Stop reason: SDUPTHER

## 2019-09-26 RX ORDER — ALFUZOSIN HYDROCHLORIDE 10 MG/1
10 TABLET, EXTENDED RELEASE ORAL DAILY
Start: 2019-09-26 | End: 2019-12-28 | Stop reason: HOSPADM

## 2019-09-26 RX ORDER — PREDNISONE 10 MG/1
10 TABLET ORAL
Status: ON HOLD
Start: 2019-09-27 | End: 2019-12-18 | Stop reason: SDUPTHER

## 2019-09-26 RX ORDER — BISACODYL 10 MG
10 SUPPOSITORY, RECTAL RECTAL DAILY PRN
Start: 2019-09-26 | End: 2019-11-06

## 2019-09-26 RX ORDER — POLYVINYL ALCOHOL 14 MG/ML
1 SOLUTION/ DROPS OPHTHALMIC
Status: ON HOLD
Start: 2019-09-26 | End: 2019-11-10

## 2019-09-26 RX ORDER — AMLODIPINE BESYLATE 5 MG/1
5 TABLET ORAL
Start: 2019-09-27 | End: 2020-01-08 | Stop reason: SDUPTHER

## 2019-09-26 RX ORDER — DOCUSATE SODIUM 100 MG/1
100 CAPSULE, LIQUID FILLED ORAL DAILY
Start: 2019-09-27 | End: 2019-11-06

## 2019-09-26 RX ORDER — CLOPIDOGREL BISULFATE 75 MG/1
75 TABLET ORAL DAILY
Qty: 30 TABLET
Start: 2019-09-26 | End: 2020-01-08 | Stop reason: SDUPTHER

## 2019-09-26 RX ADMIN — IPRATROPIUM BROMIDE AND ALBUTEROL SULFATE 3 ML: 2.5; .5 SOLUTION RESPIRATORY (INHALATION) at 00:08

## 2019-09-26 RX ADMIN — AMLODIPINE BESYLATE 5 MG: 5 TABLET ORAL at 08:04

## 2019-09-26 RX ADMIN — DOCUSATE SODIUM 100 MG: 100 CAPSULE, LIQUID FILLED ORAL at 08:02

## 2019-09-26 RX ADMIN — ESCITALOPRAM OXALATE 20 MG: 20 TABLET ORAL at 08:02

## 2019-09-26 RX ADMIN — IPRATROPIUM BROMIDE AND ALBUTEROL SULFATE 3 ML: 2.5; .5 SOLUTION RESPIRATORY (INHALATION) at 07:52

## 2019-09-26 RX ADMIN — BUDESONIDE 0.5 MG: 0.5 INHALANT RESPIRATORY (INHALATION) at 07:52

## 2019-09-26 RX ADMIN — PREDNISONE 10 MG: 10 TABLET ORAL at 08:02

## 2019-09-26 RX ADMIN — IPRATROPIUM BROMIDE AND ALBUTEROL SULFATE 3 ML: 2.5; .5 SOLUTION RESPIRATORY (INHALATION) at 13:11

## 2019-09-26 RX ADMIN — METOPROLOL TARTRATE 25 MG: 25 TABLET ORAL at 08:04

## 2019-09-26 RX ADMIN — TAMSULOSIN HYDROCHLORIDE 0.8 MG: 0.4 CAPSULE ORAL at 08:02

## 2019-09-26 RX ADMIN — LISINOPRIL 5 MG: 5 TABLET ORAL at 08:03

## 2019-09-26 RX ADMIN — FUROSEMIDE 20 MG: 20 TABLET ORAL at 08:04

## 2019-10-25 ENCOUNTER — ANESTHESIA EVENT (OUTPATIENT)
Dept: ENDOSCOPY | Age: 69
End: 2019-10-25
Payer: MEDICARE

## 2019-10-28 RX ORDER — PREDNISONE 10 MG/1
10 TABLET ORAL DAILY
COMMUNITY

## 2019-10-28 RX ORDER — POLYVINYL ALCOHOL 14 MG/ML
1 SOLUTION/ DROPS OPHTHALMIC PRN
COMMUNITY

## 2019-10-28 RX ORDER — BISACODYL 10 MG
10 SUPPOSITORY, RECTAL RECTAL DAILY PRN
COMMUNITY

## 2019-10-28 RX ORDER — LISINOPRIL 5 MG/1
5 TABLET ORAL DAILY
COMMUNITY

## 2019-10-28 RX ORDER — ATORVASTATIN CALCIUM 10 MG/1
10 TABLET, FILM COATED ORAL NIGHTLY
COMMUNITY

## 2019-10-28 RX ORDER — CLOPIDOGREL BISULFATE 75 MG/1
75 TABLET ORAL DAILY
COMMUNITY

## 2019-10-28 RX ORDER — ESCITALOPRAM OXALATE 20 MG/1
20 TABLET ORAL DAILY
COMMUNITY

## 2019-10-28 RX ORDER — DOCUSATE SODIUM 100 MG/1
100 CAPSULE, LIQUID FILLED ORAL DAILY
COMMUNITY

## 2019-10-28 RX ORDER — POLYETHYLENE GLYCOL 3350 17 G/17G
17 POWDER, FOR SOLUTION ORAL 2 TIMES DAILY PRN
COMMUNITY

## 2019-10-28 RX ORDER — POLYETHYLENE GLYCOL 3350 17 G/17G
17 POWDER, FOR SOLUTION ORAL DAILY
COMMUNITY

## 2019-10-28 RX ORDER — ACETAMINOPHEN 325 MG/1
650 TABLET ORAL EVERY 4 HOURS PRN
COMMUNITY

## 2019-10-28 RX ORDER — ONDANSETRON 4 MG/1
4 TABLET, FILM COATED ORAL EVERY 6 HOURS PRN
COMMUNITY

## 2019-10-28 RX ORDER — FINASTERIDE 5 MG/1
5 TABLET, FILM COATED ORAL DAILY
COMMUNITY

## 2019-10-28 RX ORDER — PANTOPRAZOLE SODIUM 40 MG/1
40 TABLET, DELAYED RELEASE ORAL NIGHTLY
COMMUNITY

## 2019-10-28 RX ORDER — LANOLIN ALCOHOL/MO/W.PET/CERES
5 CREAM (GRAM) TOPICAL NIGHTLY PRN
COMMUNITY

## 2019-10-28 RX ORDER — ALFUZOSIN HYDROCHLORIDE 10 MG/1
10 TABLET, EXTENDED RELEASE ORAL DAILY
COMMUNITY

## 2019-10-28 RX ORDER — NUTRITIONAL SUPPLEMENT
120 LIQUID (ML) ORAL 2 TIMES DAILY
COMMUNITY

## 2019-10-28 RX ORDER — FUROSEMIDE 20 MG/1
20 TABLET ORAL DAILY
COMMUNITY

## 2019-10-28 RX ORDER — AMLODIPINE BESYLATE 5 MG/1
5 TABLET ORAL DAILY
COMMUNITY

## 2019-10-28 RX ORDER — TRAZODONE HYDROCHLORIDE 50 MG/1
50 TABLET ORAL NIGHTLY
COMMUNITY

## 2019-10-28 RX ORDER — IPRATROPIUM BROMIDE AND ALBUTEROL SULFATE 2.5; .5 MG/3ML; MG/3ML
1 SOLUTION RESPIRATORY (INHALATION) EVERY 6 HOURS
COMMUNITY

## 2019-10-28 RX ORDER — QUETIAPINE FUMARATE 25 MG/1
12.5 TABLET, FILM COATED ORAL 2 TIMES DAILY
COMMUNITY

## 2019-10-28 RX ORDER — FLUTICASONE FUROATE AND VILANTEROL 200; 25 UG/1; UG/1
1 POWDER RESPIRATORY (INHALATION) DAILY
COMMUNITY

## 2019-10-29 ENCOUNTER — HOSPITAL ENCOUNTER (OUTPATIENT)
Age: 69
Setting detail: OUTPATIENT SURGERY
Discharge: OTHER INSTITUTION | End: 2019-10-29
Attending: INTERNAL MEDICINE | Admitting: INTERNAL MEDICINE
Payer: MEDICARE

## 2019-10-29 ENCOUNTER — ANESTHESIA (OUTPATIENT)
Dept: ENDOSCOPY | Age: 69
End: 2019-10-29
Payer: MEDICARE

## 2019-10-29 VITALS
OXYGEN SATURATION: 99 % | WEIGHT: 166 LBS | SYSTOLIC BLOOD PRESSURE: 127 MMHG | TEMPERATURE: 97.3 F | HEIGHT: 68 IN | DIASTOLIC BLOOD PRESSURE: 53 MMHG | BODY MASS INDEX: 25.16 KG/M2 | RESPIRATION RATE: 18 BRPM | HEART RATE: 65 BPM

## 2019-10-29 VITALS
RESPIRATION RATE: 16 BRPM | DIASTOLIC BLOOD PRESSURE: 51 MMHG | SYSTOLIC BLOOD PRESSURE: 93 MMHG | OXYGEN SATURATION: 98 %

## 2019-10-29 LAB
GLUCOSE BLD-MCNC: 87 MG/DL (ref 70–99)
PERFORMED ON: NORMAL

## 2019-10-29 PROCEDURE — 2580000003 HC RX 258: Performed by: ANESTHESIOLOGY

## 2019-10-29 PROCEDURE — 88305 TISSUE EXAM BY PATHOLOGIST: CPT

## 2019-10-29 PROCEDURE — 7100000000 HC PACU RECOVERY - FIRST 15 MIN: Performed by: INTERNAL MEDICINE

## 2019-10-29 PROCEDURE — 2709999900 HC NON-CHARGEABLE SUPPLY: Performed by: INTERNAL MEDICINE

## 2019-10-29 PROCEDURE — 7100000010 HC PHASE II RECOVERY - FIRST 15 MIN: Performed by: INTERNAL MEDICINE

## 2019-10-29 PROCEDURE — 3700000000 HC ANESTHESIA ATTENDED CARE: Performed by: INTERNAL MEDICINE

## 2019-10-29 PROCEDURE — 3609012400 HC EGD TRANSORAL BIOPSY SINGLE/MULTIPLE: Performed by: INTERNAL MEDICINE

## 2019-10-29 PROCEDURE — 6360000002 HC RX W HCPCS: Performed by: NURSE ANESTHETIST, CERTIFIED REGISTERED

## 2019-10-29 PROCEDURE — 7100000011 HC PHASE II RECOVERY - ADDTL 15 MIN: Performed by: INTERNAL MEDICINE

## 2019-10-29 PROCEDURE — 7100000001 HC PACU RECOVERY - ADDTL 15 MIN: Performed by: INTERNAL MEDICINE

## 2019-10-29 PROCEDURE — 2500000003 HC RX 250 WO HCPCS: Performed by: NURSE ANESTHETIST, CERTIFIED REGISTERED

## 2019-10-29 PROCEDURE — 3700000001 HC ADD 15 MINUTES (ANESTHESIA): Performed by: INTERNAL MEDICINE

## 2019-10-29 PROCEDURE — 3609017700 HC EGD DILATION GASTRIC/DUODENAL STRICTURE: Performed by: INTERNAL MEDICINE

## 2019-10-29 RX ORDER — PROPOFOL 10 MG/ML
INJECTION, EMULSION INTRAVENOUS CONTINUOUS PRN
Status: DISCONTINUED | OUTPATIENT
Start: 2019-10-29 | End: 2019-10-29 | Stop reason: SDUPTHER

## 2019-10-29 RX ORDER — LIDOCAINE HYDROCHLORIDE 20 MG/ML
INJECTION, SOLUTION EPIDURAL; INFILTRATION; INTRACAUDAL; PERINEURAL PRN
Status: DISCONTINUED | OUTPATIENT
Start: 2019-10-29 | End: 2019-10-29 | Stop reason: SDUPTHER

## 2019-10-29 RX ORDER — PROPOFOL 10 MG/ML
INJECTION, EMULSION INTRAVENOUS PRN
Status: DISCONTINUED | OUTPATIENT
Start: 2019-10-29 | End: 2019-10-29 | Stop reason: SDUPTHER

## 2019-10-29 RX ORDER — SODIUM CHLORIDE 0.9 % (FLUSH) 0.9 %
10 SYRINGE (ML) INJECTION PRN
Status: DISCONTINUED | OUTPATIENT
Start: 2019-10-29 | End: 2019-10-29 | Stop reason: HOSPADM

## 2019-10-29 RX ORDER — SODIUM CHLORIDE 9 MG/ML
INJECTION, SOLUTION INTRAVENOUS CONTINUOUS
Status: DISCONTINUED | OUTPATIENT
Start: 2019-10-29 | End: 2019-10-29 | Stop reason: HOSPADM

## 2019-10-29 RX ORDER — SODIUM CHLORIDE 0.9 % (FLUSH) 0.9 %
10 SYRINGE (ML) INJECTION EVERY 12 HOURS SCHEDULED
Status: DISCONTINUED | OUTPATIENT
Start: 2019-10-29 | End: 2019-10-29 | Stop reason: HOSPADM

## 2019-10-29 RX ORDER — ONDANSETRON 2 MG/ML
4 INJECTION INTRAMUSCULAR; INTRAVENOUS
Status: DISCONTINUED | OUTPATIENT
Start: 2019-10-29 | End: 2019-10-29 | Stop reason: HOSPADM

## 2019-10-29 RX ADMIN — PROPOFOL 60 MG: 10 INJECTION, EMULSION INTRAVENOUS at 07:36

## 2019-10-29 RX ADMIN — PROPOFOL 140 MCG/KG/MIN: 10 INJECTION, EMULSION INTRAVENOUS at 07:36

## 2019-10-29 RX ADMIN — SODIUM CHLORIDE: 9 INJECTION, SOLUTION INTRAVENOUS at 07:24

## 2019-10-29 RX ADMIN — LIDOCAINE HYDROCHLORIDE 60 MG: 20 INJECTION, SOLUTION EPIDURAL; INFILTRATION; INTRACAUDAL; PERINEURAL at 07:36

## 2019-10-29 ASSESSMENT — PULMONARY FUNCTION TESTS
PIF_VALUE: 0
PIF_VALUE: 1
PIF_VALUE: 0

## 2019-10-29 ASSESSMENT — PAIN SCALES - GENERAL
PAINLEVEL_OUTOF10: 0

## 2019-10-29 ASSESSMENT — PAIN - FUNCTIONAL ASSESSMENT: PAIN_FUNCTIONAL_ASSESSMENT: 0-10

## 2019-11-04 RX ORDER — ASPIRIN 81 MG/1
TABLET ORAL
Qty: 30 TABLET | Refills: 0 | Status: SHIPPED | OUTPATIENT
Start: 2019-11-04 | End: 2019-12-28 | Stop reason: HOSPADM

## 2019-11-04 RX ORDER — PANTOPRAZOLE SODIUM 40 MG/1
TABLET, DELAYED RELEASE ORAL
Qty: 30 TABLET | Refills: 0 | Status: SHIPPED | OUTPATIENT
Start: 2019-11-04 | End: 2020-01-08 | Stop reason: SDUPTHER

## 2019-11-04 RX ORDER — ALPRAZOLAM 0.5 MG/1
TABLET ORAL
Qty: 30 TABLET | Refills: 0 | Status: ON HOLD | OUTPATIENT
Start: 2019-11-04 | End: 2019-11-14 | Stop reason: SDUPTHER

## 2019-11-04 RX ORDER — CHOLECALCIFEROL (VITAMIN D3) 125 MCG
CAPSULE ORAL
Qty: 30 TABLET | Refills: 0 | Status: SHIPPED | OUTPATIENT
Start: 2019-11-04 | End: 2020-01-08 | Stop reason: SDUPTHER

## 2019-11-04 RX ORDER — BACLOFEN 10 MG/1
TABLET ORAL
Qty: 90 TABLET | Refills: 0 | Status: SHIPPED | OUTPATIENT
Start: 2019-11-04 | End: 2019-11-06

## 2019-11-04 RX ORDER — MIRTAZAPINE 15 MG/1
TABLET, FILM COATED ORAL
Qty: 30 TABLET | Refills: 0 | Status: SHIPPED | OUTPATIENT
Start: 2019-11-04 | End: 2019-11-06

## 2019-11-04 NOTE — TELEPHONE ENCOUNTER
These were both d/c at discharge by Joshua Mendez so I am not sure if refills are appropriate, pt has also not been seen by you since 4/2/19. I went ahead and ran alina WOODSON, please advise

## 2019-11-06 ENCOUNTER — OFFICE VISIT (OUTPATIENT)
Dept: INTERNAL MEDICINE | Facility: CLINIC | Age: 69
End: 2019-11-06

## 2019-11-06 VITALS
HEIGHT: 67 IN | OXYGEN SATURATION: 88 % | TEMPERATURE: 97.5 F | DIASTOLIC BLOOD PRESSURE: 66 MMHG | SYSTOLIC BLOOD PRESSURE: 124 MMHG | BODY MASS INDEX: 27.31 KG/M2 | RESPIRATION RATE: 18 BRPM | WEIGHT: 174 LBS | HEART RATE: 80 BPM

## 2019-11-06 DIAGNOSIS — I48.0 PAROXYSMAL ATRIAL FIBRILLATION (HCC): Primary | ICD-10-CM

## 2019-11-06 DIAGNOSIS — Z99.89 OSA ON CPAP: ICD-10-CM

## 2019-11-06 DIAGNOSIS — G47.33 OSA ON CPAP: ICD-10-CM

## 2019-11-06 DIAGNOSIS — R10.9 FLANK PAIN: ICD-10-CM

## 2019-11-06 DIAGNOSIS — J96.11 CHRONIC RESPIRATORY FAILURE WITH HYPOXIA AND HYPERCAPNIA (HCC): ICD-10-CM

## 2019-11-06 DIAGNOSIS — F41.9 ANXIETY: ICD-10-CM

## 2019-11-06 DIAGNOSIS — R13.12 OROPHARYNGEAL DYSPHAGIA: ICD-10-CM

## 2019-11-06 DIAGNOSIS — F32.A DEPRESSION, UNSPECIFIED DEPRESSION TYPE: ICD-10-CM

## 2019-11-06 DIAGNOSIS — J96.12 CHRONIC RESPIRATORY FAILURE WITH HYPOXIA AND HYPERCAPNIA (HCC): ICD-10-CM

## 2019-11-06 DIAGNOSIS — K21.9 GERD WITHOUT ESOPHAGITIS: ICD-10-CM

## 2019-11-06 DIAGNOSIS — I10 ESSENTIAL HYPERTENSION: ICD-10-CM

## 2019-11-06 DIAGNOSIS — D64.9 ANEMIA, UNSPECIFIED TYPE: ICD-10-CM

## 2019-11-06 DIAGNOSIS — J44.9 STEROID-DEPENDENT COPD (HCC): ICD-10-CM

## 2019-11-06 DIAGNOSIS — E78.2 MIXED HYPERLIPIDEMIA: ICD-10-CM

## 2019-11-06 DIAGNOSIS — N18.30 CHRONIC KIDNEY DISEASE, STAGE III (MODERATE) (HCC): ICD-10-CM

## 2019-11-06 DIAGNOSIS — I25.119 CORONARY ARTERY DISEASE INVOLVING NATIVE CORONARY ARTERY OF NATIVE HEART WITH ANGINA PECTORIS (HCC): ICD-10-CM

## 2019-11-06 PROCEDURE — 99214 OFFICE O/P EST MOD 30 MIN: CPT | Performed by: INTERNAL MEDICINE

## 2019-11-06 NOTE — PROGRESS NOTES
Subjective   Jani Pena is a 69 y.o. male.     Chief Complaint   Patient presents with   • Follow-up     HLD, HTN, CHF. Wants to discuss about medications       History of Present Illness   Patient was discharged from hospital in September for COPD exacerbation again.  Afterwards patient went to rehab center.  Patient was discharged from rehab center last week and today for follow-up of.  COPD stable now patient is using oxygen 3 to 4 L.  Denies any chest pain any significant short of breath.  Still has swallow problem even after dilation.  Kidney function declined.  Hemoglobin low.  Depression anxiety stable on medication.  Sleep apnea on CPAP is stable.  Hyperlipidemia stable.  Atrial fibrillation stable on medication.  Hypertension stable on medication.    Current Outpatient Medications:   •  acetaminophen (TYLENOL) 325 MG tablet, Take 2 tablets by mouth Every 4 (Four) Hours As Needed for Mild Pain ., Disp: , Rfl:   •  alfuzosin (UROXATRAL) 10 MG 24 hr tablet, Take 1 tablet by mouth Daily., Disp: , Rfl:   •  ALPRAZolam (XANAX) 0.5 MG tablet, TAKE ONE TABLET BY MOUTH ONCE NIGHTLY AS NEEDED FOR ANXIETY, Disp: 30 tablet, Rfl: 0  •  amLODIPine (NORVASC) 5 MG tablet, Take 1 tablet by mouth Daily., Disp: , Rfl:   •  ASPIRIN ADULT LOW STRENGTH 81 MG EC tablet, TAKE ONE TABLET BY MOUTH DAILY, Disp: 30 tablet, Rfl: 0  •  atorvastatin (LIPITOR) 10 MG tablet, Take 1 tablet by mouth Every Night., Disp: , Rfl:   •  clopidogrel (PLAVIX) 75 MG tablet, Take 1 tablet by mouth Daily., Disp: 30 tablet, Rfl:   •  escitalopram (LEXAPRO) 20 MG tablet, Take 1 tablet by mouth Daily., Disp: 30 tablet, Rfl: 6  •  finasteride (PROSCAR) 5 MG tablet, Take 1 tablet by mouth Daily., Disp: , Rfl:   •  Fluticasone Furoate-Vilanterol (BREO ELLIPTA) 200-25 MCG/INH inhaler, Inhale 1 puff Daily., Disp: , Rfl:   •  furosemide (LASIX) 20 MG tablet, Take 1 tablet by mouth Daily., Disp: , Rfl:   •  ipratropium-albuterol (DUO-NEB) 0.5-2.5 mg/mL  nebulizer, Take 3 mL by nebulization Every 6 (Six) Hours., Disp: 360 mL, Rfl:   •  lisinopril (PRINIVIL,ZESTRIL) 5 MG tablet, Take 1 tablet by mouth Daily., Disp: , Rfl:   •  melatonin 5 MG tablet tablet, ONE BY MOUTH EVERY NIGHT AT BEDTIME, Disp: 30 tablet, Rfl: 0  •  metoprolol tartrate (LOPRESSOR) 25 MG tablet, TAKE ONE TABLET BY MOUTH TWICE A DAY, Disp: 60 tablet, Rfl: 0  •  O2 (OXYGEN), Inhale 4 L/min 1 (One) Time. Walking on 4L and sitting 3L, Disp: , Rfl:   •  pantoprazole (PROTONIX) 40 MG EC tablet, TAKE ONE TABLET BY MOUTH DAILY, Disp: 30 tablet, Rfl: 0  •  polyvinyl alcohol (LIQUIFILM) 1.4 % ophthalmic solution, Administer 1 drop to both eyes Every 1 (One) Hour As Needed for Dry Eyes., Disp: , Rfl:   •  predniSONE (DELTASONE) 10 MG tablet, Take 1 tablet by mouth Daily With Breakfast., Disp: , Rfl:   •  QUEtiapine (SEROquel) 25 MG tablet, Take 0.5 tablets by mouth 3 (Three) Times a Day As Needed (anxiety/agitation)., Disp: , Rfl:   •  traZODone (DESYREL) 50 MG tablet, Take 1 tablet by mouth Every Night., Disp: , Rfl:     The following portions of the patient's history were reviewed and updated as appropriate: allergies, current medications, past family history, past medical history, past social history, past surgical history and problem list.    Review of Systems   Constitutional: Negative.    Respiratory: Positive for cough and shortness of breath.    Cardiovascular: Negative.    Gastrointestinal: Negative.    Musculoskeletal: Negative.    Skin: Negative.    Neurological: Negative.    Psychiatric/Behavioral: Negative.        Objective   Physical Exam   Constitutional: He is oriented to person, place, and time. He appears well-nourished.   Neck: Neck supple.   Cardiovascular: Normal rate, regular rhythm and normal heart sounds.   Pulmonary/Chest: Effort normal. He has rales.   Abdominal: Bowel sounds are normal.   Neurological: He is alert and oriented to person, place, and time.   Skin: Skin is warm.    Psychiatric: He has a normal mood and affect.       All tests have been reviewed.    Assessment/Plan   Diagnoses and all orders for this visit:    Paroxysmal atrial fibrillation (CMS/HCC) continue medication    Mixed hyperlipidemia continue medication    Essential hypertension continue medication    Coronary artery disease involving native coronary artery of native heart with angina pectoris (CMS/HCC) continue medication    PEDRO on CPAP    Chronic respiratory failure with hypoxia and hypercapnia (CMS/[facility]) continue medication    Steroid-dependent COPD (CMS/[facility]) continue medication    Oropharyngeal dysphagia follow-up with GI    GERD without esophagitis    Chronic kidney disease, stage III (moderate) (CMS/[facility]) follow-up with renal    Anemia, unspecified type repeat blood tests    Depression, unspecified depression type continue medication    Anxiety continue medication      3 weeks follow-up       Below is to historical records for reference only: Chronic diastolic congestive heart failure (CMS/[facility]) stable now  Coronary artery disease cath negative  SSS, s/p Cardiac pacemaker in situ follow cardio  Steroid-dependent COPD (CMS/[facility]),  continue present mx recent exacerbation, improved after medication.   Esophageal dysphagia, s/p dilation, still dysphagia. GERD without esophagitis, UGI NSD,  follow up with GI,   Lumbar radiculopathy stable   Chronic kidney disease, stage III (moderate) (CMS/[facility]) watch   Left neck pain continue baclofen tylenol and PT  osteopenia, oscal D 500mg bid continue  left low back pain resolved follow up with ortho s/p left hip surgery for severe OA doing well.   divertic hemorroid on colon 1/2013  DM stable without med  phimosis seen by uro declined surgery  Hematuria followup with urologist s/p cystoscope bladder stones  Hypertension continue med  BPH enlarged on CT scan follow up with urologist.  Anemia workup all negative.? anemia of chronic disease due to chronic disease   History of the CAD  atrial fibrillation VSD status post occlusion stable now. Status post a defibrillator seen by cardio follow up with cardio, occa chest pain follow cardio  History of a TIA patient is on Plavix continue  pneumovax done, prevnar 13 1/19/17, Td to HD.flushot done, shingrix informed  Depression anxiety cont lexapro to 20 daily and cont xanax prn  Decline to disrobe for PE  right inquinal hernia decline surgery now, call if worse  BLE edema mild, watch for now, cut down salt, avoid soda  Gall stone without sx  Rt flank pain do UA and US  CKD follow up with renal          2week and due for  wellnexx

## 2019-11-06 NOTE — PROGRESS NOTES
Jani Pena  1950  [phone]      VISIT DATE: 2/18/2019     PCP: Miguel Rojas MD  [address] [facility] [address]  [facility] [address]    IDENTIFICATION: A 68 y.o. male retired from [employer]     1. CAD  1. 1994, 2008- [facility] nonobstr  2. 2011 SE wnl  3. 5/18 nonobst cad   2. Abnl Echo  1. 2013 EF 45 w BBB  2. 1/3/18 echo: EF 45-50%, moderate LA enlargement, mitral thickening with mild MR, sclerosed aortic valve with mild AI, mild TR, apical segmental hypokinesis  3. CVD  1. B CEA -[facility] remote  2. CUS [facility] 2014 nonobst  4. HTN  5. Remote renal artery stent  6. HL  1. 6/14 137/149/42/65  7. SSS  1. 1/31/19 [facility]  8. CKD 3  9. PEDRO  10. COPD  11. PFO closure Dr Alves    Allergies  Allergies   Allergen Reactions   • Mucomyst [Acetylcysteine] Other (See Comments)     bronchospasms   • Milk-Related Compounds Nausea And Vomiting   • Sulfa Antibiotics Nausea And Vomiting       Current Medications    Current Outpatient Medications:   •  acetaminophen (TYLENOL) 325 MG tablet, Take 2 tablets by mouth 4 (Four) Times a Day As Needed for Mild Pain , Headache or Fever., Disp: , Rfl:   •  albuterol (PROVENTIL) (2.5 MG/3ML) 0.083% nebulizer solution, Take 2.5 mg by nebulization Every 4 (Four) Hours As Needed for Wheezing., Disp: , Rfl:   •  albuterol (PROVENTIL) (2.5 MG/3ML) 0.083% nebulizer solution, Take 2.5 mg by nebulization Every 6 (Six) Hours., Disp: , Rfl: 12  •  alfuzosin (UROXATRAL) 10 MG 24 hr tablet, TAKE ONE TABLET BY MOUTH DAILY, Disp: 30 tablet, Rfl: 2  •  ALPRAZolam (XANAX) 0.5 MG tablet, Take 1 tablet by mouth Every Night., Disp: 30 tablet, Rfl: 0  •  aspirin 81 MG EC tablet, Take 1 tablet by mouth Daily., Disp: 90 tablet, Rfl: 3  •  atorvastatin (LIPITOR) 10 MG tablet, Take 1 tablet by mouth Every Night., Disp: , Rfl:   •  baclofen (LIORESAL) 10 MG tablet, TAKE ONE TABLET BY MOUTH THREE TIMES A DAY (Patient not taking: Reported on 1/21/2019), Disp: 90 tablet, Rfl: 0  •  BREO ELLIPTA 200-25 MCG/INH  aerosol powder , Inhale 1 each Daily., Disp: 60 each, Rfl: 5  •  budesonide (PULMICORT) 0.5 MG/2ML nebulizer solution, Take 2 mL by nebulization 2 (Two) Times a Day., Disp: , Rfl:   •  calcium carbonate (OS-HAFSA) 600 MG tablet, Take 600 mg by mouth 2 (Two) Times a Day With Meals., Disp: , Rfl:   •  clopidogrel (PLAVIX) 75 MG tablet, TAKE ONE TABLET BY MOUTH DAILY, Disp: 30 tablet, Rfl: 4  •  docusate sodium 100 MG capsule, Take 100 mg by mouth 2 (Two) Times a Day As Needed for Constipation., Disp: , Rfl:   •  escitalopram (LEXAPRO) 10 MG tablet, Take 1 tablet by mouth Daily., Disp: 90 tablet, Rfl: 3  •  finasteride (PROSCAR) 5 MG tablet, Take 5 mg by mouth Daily., Disp: , Rfl:   •  fluticasone (FLONASE) 50 MCG/ACT nasal spray, 1 spray into each nostril Daily. (Patient taking differently: 1 spray into the nostril(s) as directed by provider Daily As Needed.), Disp: 16 g, Rfl: 5  •  furosemide (LASIX) 20 MG tablet, TAKE ONE TABLET BY MOUTH DAILY, Disp: 30 tablet, Rfl: 0  •  Ginger, Zingiber officinalis, (GINGER ROOT) 500 MG capsule, Take 500 mg by mouth 3 (Three) Times a Day Before Meals., Disp: , Rfl:   •  ipratropium-albuterol (DUO-NEB) 0.5-2.5 mg/mL nebulizer, Take 3 mL by nebulization Every 6 (Six) Hours., Disp: 360 mL, Rfl:   •  iron polysaccharides (NIFEREX) 150 MG capsule, Take 150 mg by mouth 2 (Two) Times a Day., Disp: , Rfl:   •  melatonin 5 MG sublingual tablet sublingual tablet, Place 1 tablet under the tongue Every Night., Disp: , Rfl:   •  metoprolol tartrate (LOPRESSOR) 25 MG tablet, Take 1 tablet by mouth Every 12 (Twelve) Hours., Disp: 180 tablet, Rfl: 3  •  mirtazapine (REMERON) 15 MG tablet, Take 15 mg by mouth Every Night., Disp: , Rfl:   •  Multiple Vitamins-Minerals (MULTIVITAMIN WITH MINERALS) tablet tablet, Take 1 tablet by mouth Daily., Disp: , Rfl:   •  nitroglycerin (NITROSTAT) 0.4 MG SL tablet, Place 0.4 mg under the tongue Every 5 (Five) Minutes As Needed., Disp: , Rfl:   •  O2 (OXYGEN),  Inhale 4 L/min 1 (One) Time. Walking on 4L and sitting 3L, Disp: , Rfl:   •  ondansetron (ZOFRAN) 4 MG tablet, Take 1 tablet by mouth Every 6 (Six) Hours As Needed for Nausea or Vomiting., Disp: , Rfl:   •  oxybutynin (DITROPAN) 5 MG tablet, TAKE ONE TABLET BY MOUTH EVERY NIGHT AT BEDTIME, Disp: 30 tablet, Rfl: 3  •  pantoprazole (PROTONIX) 40 MG EC tablet, Take 1 tablet by mouth Every Morning Before Breakfast., Disp: , Rfl:   •  polyethylene glycol (MIRALAX) pack packet, Take 17 g by mouth Daily., Disp: , Rfl:   •  predniSONE (DELTASONE) 10 MG tablet, Taper as follows: 20 mg po daily x 2 days, then 15 mg daily x 2 days, then 10 mg po daily continuous (home dose daily), Disp: , Rfl:   •  PROAIR  (90 BASE) MCG/ACT inhaler, Inhale 2 puffs Every 4 (Four) Hours As Needed for Wheezing or Shortness of Air., Disp: , Rfl:   •  spironolactone (ALDACTONE) 25 MG tablet, TAKE ONE TABLET BY MOUTH DAILY (Patient taking differently: Pt. taking 1/2 tablet (12.5 mg) once daily), Disp: 30 tablet, Rfl: 3  •  traMADol (ULTRAM) 50 MG tablet, Take 1 tablet by mouth Every 8 (Eight) Hours As Needed for Moderate Pain ., Disp: 30 tablet, Rfl: 5    History of Present Illness     Pt presents for early follow up after recent hospitalization.  Rehabing at [facility] in [place].  Significant cough continues.  Some need for increased O2 and steroids.l    ROS:  All systems have been reviewed and are negative with the exception of those mentioned in the HPI.    OBJECTIVE:    Hr 95 110/62     Physical Exam   Constitutional: He appears well-developed and well-nourished.   Neck: Normal range of motion. Neck supple. No hepatojugular reflux and no JVD present. Carotid bruit is not present. No tracheal deviation present. No thyromegaly present.   Cardiovascular: Normal rate, regular rhythm, S1 normal, S2 normal, intact distal pulses and normal pulses. PMI is not displaced. Exam reveals no gallop, no distant heart sounds, no friction rub, no midsystolic  click and no opening snap.   Murmur heard.  Pulses:       Radial pulses are 2+ on the right side, and 2+ on the left side.        Dorsalis pedis pulses are 2+ on the right side, and 2+ on the left side.        Posterior tibial pulses are 2+ on the right side, and 2+ on the left side.   Pulmonary/Chest: He is in respiratory distress. He has wheezes. He has rales.   Abdominal: Soft. Bowel sounds are normal. He exhibits no mass. There is no tenderness. There is no guarding.       Diagnostic Data:  Procedures      ASSESSMENT:   Diagnosis Plan   1. Coronary artery disease involving native coronary artery of native heart without angina pectoris     2. Panlobular emphysema (CMS/HCC)     3. Essential hypertension     4. Mixed hyperlipidemia         PLAN:  CAD minimal , nonobst cad.    COPD with chronic hypoxemia on high flow nasal cannula at baseline.  No apparent right heart failure at current clinically.    LV dysfunction mild with stage III CHF at current no necessity for diuresis.  Echo in January unchanged from previous.    BP acceptable. Continue statin therapy.    Dyslipidemia on statin therapy    Follow-up 3 months.  Wean prednisone as tolerant   Stop aspirin as marked diffuse ecchymosis on prednisone and plavix.      Miguel Rojas MD, thank you for referring Mr. Pena for evaluation.  I have forwarded my electronically generated recommendations to you for review.  Please do not hesitate to call with any questions.    Scribed for Ulysses Bass MD by Shawanda Swain PA-C. 2/18/2019  3:51 PM   Ulysses Bass MD, [facility]  I, Ulysses Bass MD, personally performed the services described in this documentation as scribed by the above named individual in my presence, and it is both accurate and complete.  2/18/2019  3:52 PM     yes n/a

## 2019-11-07 ENCOUNTER — HOSPITAL ENCOUNTER (OUTPATIENT)
Dept: ULTRASOUND IMAGING | Facility: HOSPITAL | Age: 69
Discharge: HOME | End: 2019-11-07
Admitting: INTERNAL MEDICINE

## 2019-11-07 ENCOUNTER — TELEPHONE (OUTPATIENT)
Dept: INTERNAL MEDICINE | Facility: CLINIC | Age: 69
End: 2019-11-07

## 2019-11-07 LAB
ALBUMIN SERPL-MCNC: 4.6 G/DL (ref 3.5–5.2)
ALBUMIN/GLOB SERPL: 2.3 G/DL
ALP SERPL-CCNC: 64 U/L (ref 39–117)
ALT SERPL-CCNC: 11 U/L (ref 1–41)
AST SERPL-CCNC: 15 U/L (ref 1–40)
BASOPHILS # BLD AUTO: 0.06 10*3/MM3 (ref 0–0.2)
BASOPHILS NFR BLD AUTO: 0.7 % (ref 0–1.5)
BILIRUB SERPL-MCNC: 0.5 MG/DL (ref 0.2–1.2)
BUN SERPL-MCNC: 45 MG/DL (ref 8–23)
BUN/CREAT SERPL: 19.7 (ref 7–25)
CALCIUM SERPL-MCNC: 9.9 MG/DL (ref 8.6–10.5)
CHLORIDE SERPL-SCNC: 103 MMOL/L (ref 98–107)
CO2 SERPL-SCNC: 30.8 MMOL/L (ref 22–29)
CREAT SERPL-MCNC: 2.28 MG/DL (ref 0.76–1.27)
EOSINOPHIL # BLD AUTO: 0.17 10*3/MM3 (ref 0–0.4)
EOSINOPHIL NFR BLD AUTO: 1.9 % (ref 0.3–6.2)
ERYTHROCYTE [DISTWIDTH] IN BLOOD BY AUTOMATED COUNT: 12.3 % (ref 12.3–15.4)
FERRITIN SERPL-MCNC: 231 NG/ML (ref 30–400)
GLOBULIN SER CALC-MCNC: 2 GM/DL
GLUCOSE SERPL-MCNC: 102 MG/DL (ref 65–99)
HCT VFR BLD AUTO: 33.8 % (ref 37.5–51)
HGB BLD-MCNC: 11.4 G/DL (ref 13–17.7)
IMM GRANULOCYTES # BLD AUTO: 0.05 10*3/MM3 (ref 0–0.05)
IMM GRANULOCYTES NFR BLD AUTO: 0.6 % (ref 0–0.5)
IRON SATN MFR SERPL: 20 % (ref 20–50)
IRON SERPL-MCNC: 59 MCG/DL (ref 59–158)
LYMPHOCYTES # BLD AUTO: 0.85 10*3/MM3 (ref 0.7–3.1)
LYMPHOCYTES NFR BLD AUTO: 9.4 % (ref 19.6–45.3)
MCH RBC QN AUTO: 31.9 PG (ref 26.6–33)
MCHC RBC AUTO-ENTMCNC: 33.7 G/DL (ref 31.5–35.7)
MCV RBC AUTO: 94.7 FL (ref 79–97)
MONOCYTES # BLD AUTO: 0.5 10*3/MM3 (ref 0.1–0.9)
MONOCYTES NFR BLD AUTO: 5.5 % (ref 5–12)
NEUTROPHILS # BLD AUTO: 7.38 10*3/MM3 (ref 1.7–7)
NEUTROPHILS NFR BLD AUTO: 81.9 % (ref 42.7–76)
NRBC BLD AUTO-RTO: 0 /100 WBC (ref 0–0.2)
PLATELET # BLD AUTO: 239 10*3/MM3 (ref 140–450)
POTASSIUM SERPL-SCNC: 4.7 MMOL/L (ref 3.5–5.2)
PROT SERPL-MCNC: 6.6 G/DL (ref 6–8.5)
RBC # BLD AUTO: 3.57 10*6/MM3 (ref 4.14–5.8)
SODIUM SERPL-SCNC: 148 MMOL/L (ref 136–145)
TIBC SERPL-MCNC: 301 MCG/DL
TSH SERPL DL<=0.005 MIU/L-ACNC: 0.43 UIU/ML (ref 0.27–4.2)
UIBC SERPL-MCNC: 242 MCG/DL (ref 112–346)
WBC # BLD AUTO: 9.01 10*3/MM3 (ref 3.4–10.8)

## 2019-11-07 PROCEDURE — 76775 US EXAM ABDO BACK WALL LIM: CPT

## 2019-11-07 NOTE — TELEPHONE ENCOUNTER
Jennifer from [facility] called in today stating that the patient had a fall last night and does have some bruising on his face and leg. She stated that he is on blood thinner but the bleeding has stopped and pt has bandaged his cut.    [email or web address]

## 2019-11-08 ENCOUNTER — APPOINTMENT (OUTPATIENT)
Dept: LAB | Facility: HOSPITAL | Age: 69
End: 2019-11-08

## 2019-11-08 ENCOUNTER — HOSPITAL ENCOUNTER (OUTPATIENT)
Dept: CT IMAGING | Facility: HOSPITAL | Age: 69
Discharge: HOME | End: 2019-11-08

## 2019-11-08 ENCOUNTER — APPOINTMENT (OUTPATIENT)
Dept: GENERAL RADIOLOGY | Facility: HOSPITAL | Age: 69
End: 2019-11-08

## 2019-11-08 ENCOUNTER — HOSPITAL ENCOUNTER (OUTPATIENT)
Dept: GENERAL RADIOLOGY | Facility: HOSPITAL | Age: 69
Discharge: HOME | End: 2019-11-08
Admitting: INTERNAL MEDICINE

## 2019-11-08 ENCOUNTER — OFFICE VISIT (OUTPATIENT)
Dept: INTERNAL MEDICINE | Facility: CLINIC | Age: 69
End: 2019-11-08

## 2019-11-08 VITALS
HEART RATE: 87 BPM | SYSTOLIC BLOOD PRESSURE: 118 MMHG | HEIGHT: 67 IN | RESPIRATION RATE: 16 BRPM | DIASTOLIC BLOOD PRESSURE: 52 MMHG | OXYGEN SATURATION: 88 % | BODY MASS INDEX: 27 KG/M2 | TEMPERATURE: 97 F | WEIGHT: 172 LBS

## 2019-11-08 DIAGNOSIS — N18.30 CHRONIC KIDNEY DISEASE, STAGE III (MODERATE) (HCC): ICD-10-CM

## 2019-11-08 DIAGNOSIS — T14.8XXA HEMATOMA: Primary | ICD-10-CM
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
## 2019-11-08 DIAGNOSIS — R10.12 LEFT UPPER QUADRANT PAIN: ICD-10-CM

## 2019-11-08 DIAGNOSIS — R06.09 DOE (DYSPNEA ON EXERTION): ICD-10-CM

## 2019-11-08 LAB
ANION GAP SERPL CALCULATED.3IONS-SCNC: 10.1 MMOL/L (ref 5–15)
BACTERIA UR QL AUTO: ABNORMAL /HPF
BASOPHILS # BLD AUTO: 0.06 10*3/MM3 (ref 0–0.2)
BASOPHILS NFR BLD AUTO: 0.7 % (ref 0–1.5)
BILIRUB UR QL STRIP: NEGATIVE
BUN BLD-MCNC: 50 MG/DL (ref 8–23)
BUN/CREAT SERPL: 19.8 (ref 7–25)
CALCIUM SPEC-SCNC: 9.8 MG/DL (ref 8.6–10.5)
CHLORIDE SERPL-SCNC: 98 MMOL/L (ref 98–107)
CLARITY UR: CLEAR
CO2 SERPL-SCNC: 33.9 MMOL/L (ref 22–29)
COLOR UR: ABNORMAL
CREAT BLD-MCNC: 2.53 MG/DL (ref 0.76–1.27)
DEPRECATED RDW RBC AUTO: 41.3 FL (ref 37–54)
EOSINOPHIL # BLD AUTO: 0.07 10*3/MM3 (ref 0–0.4)
EOSINOPHIL NFR BLD AUTO: 0.8 % (ref 0.3–6.2)
ERYTHROCYTE [DISTWIDTH] IN BLOOD BY AUTOMATED COUNT: 12.5 % (ref 12.3–15.4)
GFR SERPL CREATININE-BSD FRML MDRD: 25 ML/MIN/1.73
GLUCOSE BLD-MCNC: 95 MG/DL (ref 65–99)
GLUCOSE UR STRIP-MCNC: NEGATIVE MG/DL
HCT VFR BLD AUTO: 31.9 % (ref 37.5–51)
HGB BLD-MCNC: 10.2 G/DL (ref 13–17.7)
HGB UR QL STRIP.AUTO: ABNORMAL
HYALINE CASTS UR QL AUTO: ABNORMAL /LPF
IMM GRANULOCYTES # BLD AUTO: 0.02 10*3/MM3 (ref 0–0.05)
IMM GRANULOCYTES NFR BLD AUTO: 0.2 % (ref 0–0.5)
KETONES UR QL STRIP: NEGATIVE
LEUKOCYTE ESTERASE UR QL STRIP.AUTO: ABNORMAL
LYMPHOCYTES # BLD AUTO: 1.07 10*3/MM3 (ref 0.7–3.1)
LYMPHOCYTES NFR BLD AUTO: 12.1 % (ref 19.6–45.3)
MCH RBC QN AUTO: 29.3 PG (ref 26.6–33)
MCHC RBC AUTO-ENTMCNC: 32 G/DL (ref 31.5–35.7)
MCV RBC AUTO: 91.7 FL (ref 79–97)
MONOCYTES # BLD AUTO: 0.61 10*3/MM3 (ref 0.1–0.9)
MONOCYTES NFR BLD AUTO: 6.9 % (ref 5–12)
NEUTROPHILS # BLD AUTO: 7.03 10*3/MM3 (ref 1.7–7)
NEUTROPHILS NFR BLD AUTO: 79.3 % (ref 42.7–76)
NITRITE UR QL STRIP: POSITIVE
NRBC BLD AUTO-RTO: 0 /100 WBC (ref 0–0.2)
PH UR STRIP.AUTO: 6 [PH] (ref 5–8)
PLATELET # BLD AUTO: 232 10*3/MM3 (ref 140–450)
PMV BLD AUTO: 9.5 FL (ref 6–12)
POTASSIUM BLD-SCNC: 4.8 MMOL/L (ref 3.5–5.2)
PROT UR QL STRIP: NEGATIVE
RBC # BLD AUTO: 3.48 10*6/MM3 (ref 4.14–5.8)
RBC # UR: ABNORMAL /HPF
REF LAB TEST METHOD: ABNORMAL
SODIUM BLD-SCNC: 142 MMOL/L (ref 136–145)
SP GR UR STRIP: 1.01 (ref 1–1.03)
SQUAMOUS #/AREA URNS HPF: ABNORMAL /HPF
UROBILINOGEN UR QL STRIP: ABNORMAL
WBC NRBC COR # BLD: 8.86 10*3/MM3 (ref 3.4–10.8)
WBC UR QL AUTO: ABNORMAL /HPF

## 2019-11-08 PROCEDURE — 81001 URINALYSIS AUTO W/SCOPE: CPT | Performed by: INTERNAL MEDICINE

## 2019-11-08 PROCEDURE — 99214 OFFICE O/P EST MOD 30 MIN: CPT | Performed by: INTERNAL MEDICINE

## 2019-11-08 PROCEDURE — 36415 COLL VENOUS BLD VENIPUNCTURE: CPT | Performed by: INTERNAL MEDICINE

## 2019-11-08 PROCEDURE — 74176 CT ABD & PELVIS W/O CONTRAST: CPT

## 2019-11-08 PROCEDURE — 80048 BASIC METABOLIC PNL TOTAL CA: CPT | Performed by: INTERNAL MEDICINE

## 2019-11-08 PROCEDURE — 70450 CT HEAD/BRAIN W/O DYE: CPT

## 2019-11-08 PROCEDURE — 71046 X-RAY EXAM CHEST 2 VIEWS: CPT

## 2019-11-08 PROCEDURE — 85025 COMPLETE CBC W/AUTO DIFF WBC: CPT | Performed by: INTERNAL MEDICINE

## 2019-11-08 NOTE — PROGRESS NOTES
Subjective   Jani Pena is a 69 y.o. male.     Chief Complaint   Patient presents with   • Follow-up     Pt states that he fell off the couch and has multiple bruises on his arms and inner left thigh. Pt does not remember how he fell.       History of Present Illness   Fell Wednesday night 1:30am woke up in the couch in a daze, on his way to bed patient fell on the coffee table , patient went to bed afterwards. Patient developped significant bruise in both arms and legs. Some soft tissue pain and tender. And no pain when walking or movements. Baseline back pain, and soa slightly worse, no chest pain or soa or syncope, no dizzy, no HA , mild left upper quadrant abd pain    Current Outpatient Medications:   •  acetaminophen (TYLENOL) 325 MG tablet, Take 2 tablets by mouth Every 4 (Four) Hours As Needed for Mild Pain ., Disp: , Rfl:   •  alfuzosin (UROXATRAL) 10 MG 24 hr tablet, Take 1 tablet by mouth Daily., Disp: , Rfl:   •  ALPRAZolam (XANAX) 0.5 MG tablet, TAKE ONE TABLET BY MOUTH ONCE NIGHTLY AS NEEDED FOR ANXIETY, Disp: 30 tablet, Rfl: 0  •  amLODIPine (NORVASC) 5 MG tablet, Take 1 tablet by mouth Daily., Disp: , Rfl:   •  ASPIRIN ADULT LOW STRENGTH 81 MG EC tablet, TAKE ONE TABLET BY MOUTH DAILY, Disp: 30 tablet, Rfl: 0  •  atorvastatin (LIPITOR) 10 MG tablet, Take 1 tablet by mouth Every Night., Disp: , Rfl:   •  clopidogrel (PLAVIX) 75 MG tablet, Take 1 tablet by mouth Daily., Disp: 30 tablet, Rfl:   •  escitalopram (LEXAPRO) 20 MG tablet, Take 1 tablet by mouth Daily., Disp: 30 tablet, Rfl: 6  •  finasteride (PROSCAR) 5 MG tablet, Take 1 tablet by mouth Daily., Disp: , Rfl:   •  Fluticasone Furoate-Vilanterol (BREO ELLIPTA) 200-25 MCG/INH inhaler, Inhale 1 puff Daily., Disp: , Rfl:   •  furosemide (LASIX) 20 MG tablet, Take 1 tablet by mouth Daily., Disp: , Rfl:   •  ipratropium-albuterol (DUO-NEB) 0.5-2.5 mg/mL nebulizer, Take 3 mL by nebulization Every 6 (Six) Hours., Disp: 360 mL, Rfl:   •   lisinopril (PRINIVIL,ZESTRIL) 5 MG tablet, Take 1 tablet by mouth Daily., Disp: , Rfl:   •  melatonin 5 MG tablet tablet, ONE BY MOUTH EVERY NIGHT AT BEDTIME, Disp: 30 tablet, Rfl: 0  •  metoprolol tartrate (LOPRESSOR) 25 MG tablet, TAKE ONE TABLET BY MOUTH TWICE A DAY, Disp: 60 tablet, Rfl: 0  •  O2 (OXYGEN), Inhale 4 L/min 1 (One) Time. Walking on 4L and sitting 3L, Disp: , Rfl:   •  pantoprazole (PROTONIX) 40 MG EC tablet, TAKE ONE TABLET BY MOUTH DAILY, Disp: 30 tablet, Rfl: 0  •  polyvinyl alcohol (LIQUIFILM) 1.4 % ophthalmic solution, Administer 1 drop to both eyes Every 1 (One) Hour As Needed for Dry Eyes., Disp: , Rfl:   •  predniSONE (DELTASONE) 10 MG tablet, Take 1 tablet by mouth Daily With Breakfast., Disp: , Rfl:   •  QUEtiapine (SEROquel) 25 MG tablet, Take 0.5 tablets by mouth 3 (Three) Times a Day As Needed (anxiety/agitation)., Disp: , Rfl:   •  traZODone (DESYREL) 50 MG tablet, Take 1 tablet by mouth Every Night., Disp: , Rfl:     The following portions of the patient's history were reviewed and updated as appropriate: allergies, current medications, past family history, past medical history, past social history, past surgical history and problem list.    Review of Systems   Constitutional: Negative.    Respiratory: Positive for shortness of breath (worse than baseline).    Cardiovascular: Positive for leg swelling.   Gastrointestinal: Positive for abdominal pain.   Musculoskeletal: Positive for back pain (baseline).   Skin: Negative.         Bruise all 4 exts.    Neurological: Negative.  Negative for dizziness and headaches.   Psychiatric/Behavioral: Negative.        Objective   Physical Exam   Constitutional: He is oriented to person, place, and time. He appears well-nourished.   Neck: Neck supple.   Cardiovascular: Normal rate, regular rhythm and normal heart sounds.   Pulmonary/Chest: Effort normal. He has wheezes. He has rales.   Abdominal: Bowel sounds are normal. There is tenderness (mild  LUQ tender and bruise).   Musculoskeletal: He exhibits edema and tenderness (mild soft tissue tender at hematoma).   Neurological: He is alert and oriented to person, place, and time. He displays normal reflexes. No cranial nerve deficit or sensory deficit. He exhibits normal muscle tone. Coordination normal.   Skin: Skin is warm.   Psychiatric: He has a normal mood and affect.       All tests have been reviewed.    Assessment/Plan   Diagnoses and all orders for this visit:    Hematoma  -     CBC & Differential    Chronic kidney disease, stage III (moderate) (CMS/HCC) increase water intake    AQUINO (dyspnea on exertion)-     XR Chest PA & Lateral, repeat O@ sat normal    End stage lung disease, , unsteady now, need wheelchair    Abd pain do CT     CT head rule out cerebral bleeding    As scheduled, to ER if worse

## 2019-11-10 ENCOUNTER — HOSPITAL ENCOUNTER (INPATIENT)
Facility: HOSPITAL | Age: 69
LOS: 4 days | Discharge: NURSING, REHAB OR LONG-TERM CARE | End: 2019-11-14
Attending: EMERGENCY MEDICINE | Admitting: INTERNAL MEDICINE

## 2019-11-10 ENCOUNTER — APPOINTMENT (OUTPATIENT)
Dept: CT IMAGING | Facility: HOSPITAL | Age: 69
End: 2019-11-10

## 2019-11-10 DIAGNOSIS — J96.22 ACUTE ON CHRONIC RESPIRATORY FAILURE WITH HYPOXIA AND HYPERCAPNIA (HCC): Primary | ICD-10-CM

## 2019-11-10 DIAGNOSIS — Z74.09 IMPAIRED MOBILITY AND ADLS: ICD-10-CM

## 2019-11-10 DIAGNOSIS — J96.21 ACUTE ON CHRONIC RESPIRATORY FAILURE WITH HYPOXIA AND HYPERCAPNIA (HCC): Primary | ICD-10-CM

## 2019-11-10 DIAGNOSIS — Z78.9 IMPAIRED MOBILITY AND ADLS: ICD-10-CM

## 2019-11-10 PROBLEM — R29.6 REPEATED FALLS: Status: ACTIVE | Noted: 2019-11-10

## 2019-11-10 PROBLEM — S40.022A BRUISE OF BOTH ARMS: Status: ACTIVE | Noted: 2019-11-10

## 2019-11-10 PROBLEM — S40.021A BRUISE OF BOTH ARMS: Status: ACTIVE | Noted: 2019-11-10

## 2019-11-10 LAB
A-A DO2: 64.8 MMHG
ALBUMIN SERPL-MCNC: 4 G/DL (ref 3.5–5.2)
ALBUMIN/GLOB SERPL: 1.5 G/DL
ALP SERPL-CCNC: 74 U/L (ref 39–117)
ALT SERPL W P-5'-P-CCNC: 15 U/L (ref 1–41)
ANION GAP SERPL CALCULATED.3IONS-SCNC: 12.1 MMOL/L (ref 5–15)
ARTERIAL PATENCY WRIST A: ABNORMAL
AST SERPL-CCNC: 21 U/L (ref 1–40)
ATMOSPHERIC PRESS: 737 MMHG
BACTERIA UR QL AUTO: ABNORMAL /HPF
BASE EXCESS BLDA CALC-SCNC: 4.9 MMOL/L (ref 0–2)
BASOPHILS # BLD AUTO: 0.05 10*3/MM3 (ref 0–0.2)
BASOPHILS NFR BLD AUTO: 0.6 % (ref 0–1.5)
BDY SITE: ABNORMAL
BILIRUB SERPL-MCNC: 0.5 MG/DL (ref 0.2–1.2)
BILIRUB UR QL STRIP: NEGATIVE
BUN BLD-MCNC: 60 MG/DL (ref 8–23)
BUN/CREAT SERPL: 21.7 (ref 7–25)
CALCIUM SPEC-SCNC: 9.9 MG/DL (ref 8.6–10.5)
CHLORIDE SERPL-SCNC: 105 MMOL/L (ref 98–107)
CK SERPL-CCNC: 231 U/L (ref 20–200)
CLARITY UR: CLEAR
CO2 SERPL-SCNC: 31.9 MMOL/L (ref 22–29)
COHGB MFR BLD: 0.8 % (ref 0–2)
COLOR UR: YELLOW
CREAT BLD-MCNC: 2.77 MG/DL (ref 0.76–1.27)
D-LACTATE SERPL-SCNC: 1.1 MMOL/L (ref 0.5–2)
DEPRECATED RDW RBC AUTO: 49.1 FL (ref 37–54)
EOSINOPHIL # BLD AUTO: 0.4 10*3/MM3 (ref 0–0.4)
EOSINOPHIL NFR BLD AUTO: 5 % (ref 0.3–6.2)
ERYTHROCYTE [DISTWIDTH] IN BLOOD BY AUTOMATED COUNT: 13.2 % (ref 12.3–15.4)
GAS FLOW AIRWAY: 3 LPM
GFR SERPL CREATININE-BSD FRML MDRD: 23 ML/MIN/1.73
GLOBULIN UR ELPH-MCNC: 2.6 GM/DL
GLUCOSE BLD-MCNC: 113 MG/DL (ref 65–99)
GLUCOSE UR STRIP-MCNC: NEGATIVE MG/DL
HCO3 BLDA-SCNC: 32.3 MMOL/L (ref 22–28)
HCT VFR BLD AUTO: 33.9 % (ref 37.5–51)
HCT VFR BLD CALC: 32.1 %
HGB BLD-MCNC: 10.3 G/DL (ref 13–17.7)
HGB BLDA-MCNC: 10.5 G/DL (ref 12–18)
HGB UR QL STRIP.AUTO: NEGATIVE
HOLD SPECIMEN: NORMAL
HOLD SPECIMEN: NORMAL
HOROWITZ INDEX BLD+IHG-RTO: 32 %
HYALINE CASTS UR QL AUTO: ABNORMAL /LPF
IMM GRANULOCYTES # BLD AUTO: 0.03 10*3/MM3 (ref 0–0.05)
IMM GRANULOCYTES NFR BLD AUTO: 0.4 % (ref 0–0.5)
KETONES UR QL STRIP: NEGATIVE
LEUKOCYTE ESTERASE UR QL STRIP.AUTO: ABNORMAL
LYMPHOCYTES # BLD AUTO: 1.12 10*3/MM3 (ref 0.7–3.1)
LYMPHOCYTES NFR BLD AUTO: 14 % (ref 19.6–45.3)
MAGNESIUM SERPL-MCNC: 2.1 MG/DL (ref 1.6–2.4)
MCH RBC QN AUTO: 30.6 PG (ref 26.6–33)
MCHC RBC AUTO-ENTMCNC: 30.4 G/DL (ref 31.5–35.7)
MCV RBC AUTO: 100.6 FL (ref 79–97)
METHGB BLD QL: 1.2 % (ref 0–1.5)
MODALITY: ABNORMAL
MONOCYTES # BLD AUTO: 0.71 10*3/MM3 (ref 0.1–0.9)
MONOCYTES NFR BLD AUTO: 8.9 % (ref 5–12)
NEUTROPHILS # BLD AUTO: 5.7 10*3/MM3 (ref 1.7–7)
NEUTROPHILS NFR BLD AUTO: 71.1 % (ref 42.7–76)
NITRITE UR QL STRIP: NEGATIVE
NOTE: ABNORMAL
NRBC BLD AUTO-RTO: 0 /100 WBC (ref 0–0.2)
OXYHGB MFR BLDV: 94.3 % (ref 94–99)
PCO2 BLDA: 62.4 MM HG (ref 35–45)
PCO2 TEMP ADJ BLD: ABNORMAL MM[HG]
PH BLDA: 7.32 PH UNITS (ref 7.3–7.5)
PH UR STRIP.AUTO: <=5 [PH] (ref 5–8)
PH, TEMP CORRECTED: ABNORMAL
PLATELET # BLD AUTO: 215 10*3/MM3 (ref 140–450)
PMV BLD AUTO: 9.3 FL (ref 6–12)
PO2 BLDA: 86.5 MM HG (ref 75–100)
PO2 TEMP ADJ BLD: ABNORMAL MM[HG]
POTASSIUM BLD-SCNC: 4.3 MMOL/L (ref 3.5–5.2)
PROCALCITONIN SERPL-MCNC: 0.13 NG/ML (ref 0.1–0.25)
PROT SERPL-MCNC: 6.6 G/DL (ref 6–8.5)
PROT UR QL STRIP: NEGATIVE
RBC # BLD AUTO: 3.37 10*6/MM3 (ref 4.14–5.8)
RBC # UR: ABNORMAL /HPF
REF LAB TEST METHOD: ABNORMAL
SAO2 % BLDCOA: 96.2 % (ref 94–100)
SODIUM BLD-SCNC: 149 MMOL/L (ref 136–145)
SP GR UR STRIP: 1.01 (ref 1–1.03)
SQUAMOUS #/AREA URNS HPF: ABNORMAL /HPF
TROPONIN T SERPL-MCNC: 0.03 NG/ML (ref 0–0.03)
UROBILINOGEN UR QL STRIP: ABNORMAL
VENTILATOR MODE: ABNORMAL
WBC NRBC COR # BLD: 8.01 10*3/MM3 (ref 3.4–10.8)
WBC UR QL AUTO: ABNORMAL /HPF
WHOLE BLOOD HOLD SPECIMEN: NORMAL
WHOLE BLOOD HOLD SPECIMEN: NORMAL

## 2019-11-10 PROCEDURE — 94799 UNLISTED PULMONARY SVC/PX: CPT

## 2019-11-10 PROCEDURE — 84484 ASSAY OF TROPONIN QUANT: CPT | Performed by: EMERGENCY MEDICINE

## 2019-11-10 PROCEDURE — 82375 ASSAY CARBOXYHB QUANT: CPT

## 2019-11-10 PROCEDURE — 25010000002 METHYLPREDNISOLONE PER 125 MG: Performed by: EMERGENCY MEDICINE

## 2019-11-10 PROCEDURE — 83735 ASSAY OF MAGNESIUM: CPT | Performed by: EMERGENCY MEDICINE

## 2019-11-10 PROCEDURE — 84145 PROCALCITONIN (PCT): CPT | Performed by: EMERGENCY MEDICINE

## 2019-11-10 PROCEDURE — 82550 ASSAY OF CK (CPK): CPT | Performed by: EMERGENCY MEDICINE

## 2019-11-10 PROCEDURE — 72125 CT NECK SPINE W/O DYE: CPT

## 2019-11-10 PROCEDURE — 94660 CPAP INITIATION&MGMT: CPT

## 2019-11-10 PROCEDURE — 87040 BLOOD CULTURE FOR BACTERIA: CPT | Performed by: EMERGENCY MEDICINE

## 2019-11-10 PROCEDURE — 99284 EMERGENCY DEPT VISIT MOD MDM: CPT

## 2019-11-10 PROCEDURE — 94640 AIRWAY INHALATION TREATMENT: CPT

## 2019-11-10 PROCEDURE — 74176 CT ABD & PELVIS W/O CONTRAST: CPT

## 2019-11-10 PROCEDURE — 99223 1ST HOSP IP/OBS HIGH 75: CPT | Performed by: INTERNAL MEDICINE

## 2019-11-10 PROCEDURE — 80053 COMPREHEN METABOLIC PANEL: CPT | Performed by: EMERGENCY MEDICINE

## 2019-11-10 PROCEDURE — 83605 ASSAY OF LACTIC ACID: CPT | Performed by: EMERGENCY MEDICINE

## 2019-11-10 PROCEDURE — 25010000002 CEFTRIAXONE SODIUM-DEXTROSE 1-3.74 GM-%(50ML) RECONSTITUTED SOLUTION: Performed by: EMERGENCY MEDICINE

## 2019-11-10 PROCEDURE — 83050 HGB METHEMOGLOBIN QUAN: CPT

## 2019-11-10 PROCEDURE — 71250 CT THORAX DX C-: CPT

## 2019-11-10 PROCEDURE — 36600 WITHDRAWAL OF ARTERIAL BLOOD: CPT

## 2019-11-10 PROCEDURE — 25010000002 METHYLPREDNISOLONE PER 125 MG: Performed by: INTERNAL MEDICINE

## 2019-11-10 PROCEDURE — 93005 ELECTROCARDIOGRAM TRACING: CPT

## 2019-11-10 PROCEDURE — 81001 URINALYSIS AUTO W/SCOPE: CPT | Performed by: EMERGENCY MEDICINE

## 2019-11-10 PROCEDURE — 82805 BLOOD GASES W/O2 SATURATION: CPT

## 2019-11-10 PROCEDURE — 70450 CT HEAD/BRAIN W/O DYE: CPT

## 2019-11-10 PROCEDURE — 85025 COMPLETE CBC W/AUTO DIFF WBC: CPT | Performed by: EMERGENCY MEDICINE

## 2019-11-10 PROCEDURE — 87086 URINE CULTURE/COLONY COUNT: CPT | Performed by: EMERGENCY MEDICINE

## 2019-11-10 PROCEDURE — 25010000002 FENTANYL CITRATE (PF) 100 MCG/2ML SOLUTION: Performed by: EMERGENCY MEDICINE

## 2019-11-10 PROCEDURE — 87076 CULTURE ANAEROBE IDENT EACH: CPT | Performed by: EMERGENCY MEDICINE

## 2019-11-10 RX ORDER — TRAMADOL HYDROCHLORIDE 50 MG/1
50 TABLET ORAL EVERY 4 HOURS PRN
Status: DISCONTINUED | OUTPATIENT
Start: 2019-11-10 | End: 2019-11-14 | Stop reason: HOSPADM

## 2019-11-10 RX ORDER — TAMSULOSIN HYDROCHLORIDE 0.4 MG/1
0.4 CAPSULE ORAL NIGHTLY
Status: DISCONTINUED | OUTPATIENT
Start: 2019-11-10 | End: 2019-11-14 | Stop reason: HOSPADM

## 2019-11-10 RX ORDER — HYDROCODONE BITARTRATE AND ACETAMINOPHEN 5; 325 MG/1; MG/1
1 TABLET ORAL EVERY 6 HOURS PRN
Status: DISCONTINUED | OUTPATIENT
Start: 2019-11-10 | End: 2019-11-12

## 2019-11-10 RX ORDER — IPRATROPIUM BROMIDE AND ALBUTEROL SULFATE 2.5; .5 MG/3ML; MG/3ML
3 SOLUTION RESPIRATORY (INHALATION)
Status: DISCONTINUED | OUTPATIENT
Start: 2019-11-10 | End: 2019-11-10

## 2019-11-10 RX ORDER — ATORVASTATIN CALCIUM 10 MG/1
10 TABLET, FILM COATED ORAL NIGHTLY
Status: DISCONTINUED | OUTPATIENT
Start: 2019-11-10 | End: 2019-11-14 | Stop reason: HOSPADM

## 2019-11-10 RX ORDER — LISINOPRIL 5 MG/1
5 TABLET ORAL DAILY
Status: DISCONTINUED | OUTPATIENT
Start: 2019-11-10 | End: 2019-11-14 | Stop reason: HOSPADM

## 2019-11-10 RX ORDER — QUETIAPINE FUMARATE 25 MG/1
12.5 TABLET, FILM COATED ORAL 3 TIMES DAILY PRN
Status: DISCONTINUED | OUTPATIENT
Start: 2019-11-10 | End: 2019-11-14 | Stop reason: HOSPADM

## 2019-11-10 RX ORDER — PANTOPRAZOLE SODIUM 40 MG/1
40 TABLET, DELAYED RELEASE ORAL
Status: DISCONTINUED | OUTPATIENT
Start: 2019-11-10 | End: 2019-11-14 | Stop reason: HOSPADM

## 2019-11-10 RX ORDER — FENTANYL CITRATE 50 UG/ML
25 INJECTION, SOLUTION INTRAMUSCULAR; INTRAVENOUS ONCE
Status: COMPLETED | OUTPATIENT
Start: 2019-11-10 | End: 2019-11-10

## 2019-11-10 RX ORDER — IPRATROPIUM BROMIDE AND ALBUTEROL SULFATE 2.5; .5 MG/3ML; MG/3ML
3 SOLUTION RESPIRATORY (INHALATION)
Status: DISCONTINUED | OUTPATIENT
Start: 2019-11-10 | End: 2019-11-14 | Stop reason: HOSPADM

## 2019-11-10 RX ORDER — FINASTERIDE 5 MG/1
5 TABLET, FILM COATED ORAL DAILY
Status: DISCONTINUED | OUTPATIENT
Start: 2019-11-10 | End: 2019-11-14 | Stop reason: HOSPADM

## 2019-11-10 RX ORDER — BUDESONIDE AND FORMOTEROL FUMARATE DIHYDRATE 80; 4.5 UG/1; UG/1
2 AEROSOL RESPIRATORY (INHALATION)
Status: DISCONTINUED | OUTPATIENT
Start: 2019-11-10 | End: 2019-11-14 | Stop reason: HOSPADM

## 2019-11-10 RX ORDER — ALPRAZOLAM 0.5 MG/1
0.5 TABLET ORAL NIGHTLY PRN
Status: DISCONTINUED | OUTPATIENT
Start: 2019-11-10 | End: 2019-11-14 | Stop reason: HOSPADM

## 2019-11-10 RX ORDER — METHYLPREDNISOLONE SODIUM SUCCINATE 125 MG/2ML
60 INJECTION, POWDER, LYOPHILIZED, FOR SOLUTION INTRAMUSCULAR; INTRAVENOUS EVERY 12 HOURS
Status: DISCONTINUED | OUTPATIENT
Start: 2019-11-10 | End: 2019-11-14 | Stop reason: HOSPADM

## 2019-11-10 RX ORDER — IPRATROPIUM BROMIDE AND ALBUTEROL SULFATE 2.5; .5 MG/3ML; MG/3ML
3 SOLUTION RESPIRATORY (INHALATION) ONCE
Status: COMPLETED | OUTPATIENT
Start: 2019-11-10 | End: 2019-11-10

## 2019-11-10 RX ORDER — SODIUM CHLORIDE 0.9 % (FLUSH) 0.9 %
10 SYRINGE (ML) INJECTION AS NEEDED
Status: DISCONTINUED | OUTPATIENT
Start: 2019-11-10 | End: 2019-11-14 | Stop reason: HOSPADM

## 2019-11-10 RX ORDER — METHYLPREDNISOLONE SODIUM SUCCINATE 125 MG/2ML
125 INJECTION, POWDER, LYOPHILIZED, FOR SOLUTION INTRAMUSCULAR; INTRAVENOUS ONCE
Status: COMPLETED | OUTPATIENT
Start: 2019-11-10 | End: 2019-11-10

## 2019-11-10 RX ORDER — ASPIRIN 81 MG/1
81 TABLET ORAL DAILY
Status: DISCONTINUED | OUTPATIENT
Start: 2019-11-10 | End: 2019-11-14 | Stop reason: HOSPADM

## 2019-11-10 RX ORDER — CEFTRIAXONE 1 G/50ML
1 INJECTION, SOLUTION INTRAVENOUS ONCE
Status: COMPLETED | OUTPATIENT
Start: 2019-11-10 | End: 2019-11-10

## 2019-11-10 RX ORDER — PREDNISONE 10 MG/1
10 TABLET ORAL
Status: DISCONTINUED | OUTPATIENT
Start: 2019-11-11 | End: 2019-11-14 | Stop reason: HOSPADM

## 2019-11-10 RX ORDER — ESCITALOPRAM OXALATE 10 MG/1
10 TABLET ORAL DAILY
Status: DISCONTINUED | OUTPATIENT
Start: 2019-11-10 | End: 2019-11-14 | Stop reason: HOSPADM

## 2019-11-10 RX ORDER — AMLODIPINE BESYLATE 5 MG/1
5 TABLET ORAL
Status: DISCONTINUED | OUTPATIENT
Start: 2019-11-10 | End: 2019-11-14 | Stop reason: HOSPADM

## 2019-11-10 RX ORDER — CEFTRIAXONE 1 G/50ML
1 INJECTION, SOLUTION INTRAVENOUS EVERY 24 HOURS
Status: COMPLETED | OUTPATIENT
Start: 2019-11-11 | End: 2019-11-13

## 2019-11-10 RX ORDER — FUROSEMIDE 20 MG/1
20 TABLET ORAL DAILY
Status: DISCONTINUED | OUTPATIENT
Start: 2019-11-10 | End: 2019-11-14 | Stop reason: HOSPADM

## 2019-11-10 RX ORDER — CYCLOBENZAPRINE HCL 10 MG
5 TABLET ORAL 3 TIMES DAILY PRN
Status: DISCONTINUED | OUTPATIENT
Start: 2019-11-10 | End: 2019-11-12

## 2019-11-10 RX ORDER — TRAZODONE HYDROCHLORIDE 50 MG/1
50 TABLET ORAL NIGHTLY
Status: DISCONTINUED | OUTPATIENT
Start: 2019-11-10 | End: 2019-11-14 | Stop reason: HOSPADM

## 2019-11-10 RX ADMIN — HYDROCODONE BITARTRATE AND ACETAMINOPHEN 1 TABLET: 5; 325 TABLET ORAL at 23:32

## 2019-11-10 RX ADMIN — FINASTERIDE 5 MG: 5 TABLET, FILM COATED ORAL at 16:31

## 2019-11-10 RX ADMIN — CEFTRIAXONE 1 G: 1 INJECTION, SOLUTION INTRAVENOUS at 12:23

## 2019-11-10 RX ADMIN — LISINOPRIL 5 MG: 5 TABLET ORAL at 16:31

## 2019-11-10 RX ADMIN — TRAMADOL HYDROCHLORIDE 50 MG: 50 TABLET, FILM COATED ORAL at 19:10

## 2019-11-10 RX ADMIN — METOPROLOL TARTRATE 25 MG: 25 TABLET ORAL at 21:00

## 2019-11-10 RX ADMIN — ESCITALOPRAM OXALATE 10 MG: 10 TABLET ORAL at 16:31

## 2019-11-10 RX ADMIN — FUROSEMIDE 20 MG: 20 TABLET ORAL at 16:31

## 2019-11-10 RX ADMIN — CYCLOBENZAPRINE HYDROCHLORIDE 5 MG: 10 TABLET, FILM COATED ORAL at 19:10

## 2019-11-10 RX ADMIN — PANTOPRAZOLE SODIUM 40 MG: 40 TABLET, DELAYED RELEASE ORAL at 16:31

## 2019-11-10 RX ADMIN — HYDROCODONE BITARTRATE AND ACETAMINOPHEN 1 TABLET: 5; 325 TABLET ORAL at 16:31

## 2019-11-10 RX ADMIN — METHYLPREDNISOLONE SODIUM SUCCINATE 125 MG: 125 INJECTION, POWDER, FOR SOLUTION INTRAMUSCULAR; INTRAVENOUS at 11:39

## 2019-11-10 RX ADMIN — FENTANYL CITRATE 25 MCG: 50 INJECTION, SOLUTION INTRAMUSCULAR; INTRAVENOUS at 12:35

## 2019-11-10 RX ADMIN — ALPRAZOLAM 0.5 MG: 0.5 TABLET ORAL at 23:32

## 2019-11-10 RX ADMIN — IPRATROPIUM BROMIDE AND ALBUTEROL SULFATE 3 ML: .5; 3 SOLUTION RESPIRATORY (INHALATION) at 11:59

## 2019-11-10 RX ADMIN — IPRATROPIUM BROMIDE AND ALBUTEROL SULFATE 3 ML: .5; 3 SOLUTION RESPIRATORY (INHALATION) at 19:50

## 2019-11-10 RX ADMIN — ASPIRIN 81 MG: 81 TABLET, COATED ORAL at 16:31

## 2019-11-10 RX ADMIN — ATORVASTATIN CALCIUM 10 MG: 10 TABLET, FILM COATED ORAL at 21:00

## 2019-11-10 RX ADMIN — AMLODIPINE BESYLATE 5 MG: 5 TABLET ORAL at 16:31

## 2019-11-10 RX ADMIN — TAMSULOSIN HYDROCHLORIDE 0.4 MG: 0.4 CAPSULE ORAL at 21:00

## 2019-11-10 RX ADMIN — TRAZODONE HYDROCHLORIDE 50 MG: 50 TABLET ORAL at 21:00

## 2019-11-10 RX ADMIN — BUDESONIDE AND FORMOTEROL FUMARATE DIHYDRATE 2 PUFF: 80; 4.5 AEROSOL RESPIRATORY (INHALATION) at 19:51

## 2019-11-10 RX ADMIN — SODIUM CHLORIDE 500 ML: 9 INJECTION, SOLUTION INTRAVENOUS at 10:15

## 2019-11-10 RX ADMIN — METHYLPREDNISOLONE SODIUM SUCCINATE 60 MG: 125 INJECTION, POWDER, FOR SOLUTION INTRAMUSCULAR; INTRAVENOUS at 21:00

## 2019-11-10 NOTE — H&P
[facility]   HISTORY AND PHYSICAL      Name:  Jani Pena   Age:  69 y.o.  Sex:  male  :  1950  MRN:  [id number]   Visit Number:  [id number]  Admission Date:  11/10/2019  Date Of Service:  11/10/19  Primary Care Physician:  Miguel Rojas MD     Admitting diagnosis:      Acute on chronic respiratory failure with hypoxia and hypercapnia (CMS/HCC)    COPD with exacerbation (CMS/HCC)    Repeated falls    Anxiety    Atrial fibrillation (CMS/HCC)    Chronic kidney disease, stage III (moderate) (CMS/HCC)    Essential hypertension    Back pain    Impaired mobility and ADLs    Anemia    Bruise of both arms        History Of Presenting Illness:      69-year-old patient comes into the ER because of multiple falls and multiple other complaints including breathing trouble weakness and back pain.  Apparently he has a past medical history of COPD on home oxygen, sleep apnea who uses CPAP, history of atrial fibrillation not on anticoagulation due to falls, chronic kidney disease, hypertension, back pain, who recently was in the rehab and discharge about a week or 2 ago from [facility] as been having repeated falls.  He did have a fall last week for which he visited his primary physician and a CT scan as well as work-up was done which was unremarkable except multiple bruises noted on upper extremities as well as lower extremities mainly involving the right thigh.  Today patient again had a fall and has been complaining more of back pain so he came into the ER.  In the ER evaluation done showed he has COPD exacerbation with hypercapnia along with fall and is very weak to do his daily activities.  Patient was given a steroid and there was a possibility of UTI so a dose of Rocephin was given with neb and further admitted for management of the above.    He states that he has been having this back pain which is worse and could be having UTI as he is having some dysuria.  UA did show  few WBCs but nitrite negative.  His white count is 11,000 and of note his ABG shows hypercapnia with PCO2 of 62.  He has not yet had an x-ray in the ER and patient states that he has been coughing some.  Patient has been further admitted for exacerbation of COPD and with multiple falls needing rehab and may benefit from a inpatient rehab transfer upon discharge    Review Of Systems:     The following systems were reviewed and negative;  constitution, eyes, ENT, respiratory, cardiovascular, gastrointestinal, genitourinary, musculoskeletal, neurological and behavioral/psych,  Skin except as above.     Past Medical History:    Past Medical History:   Diagnosis Date   • Abnormal liver enzymes    • Anemia    • Anxiety    • Arthritis    • Atherosclerotic heart disease of native coronary artery without angina pectoris    • Atrial fibrillation (CMS/HCC)    • Back pain    • BPH (benign prostatic hyperplasia)    • Cataract, bilateral    • Chest pain     2-3 MONTHS AGO.  PER PT, HAS ADDRESSED WITH CARDIOLOGIST.  STATES HAS NTG PRN.   • CHF (congestive heart failure) (CMS/HCC)    • Chronic bronchitis (CMS/HCC)    • Chronic kidney disease    • COPD (chronic obstructive pulmonary disease) (CMS/HCC)    • Degeneration of cervical intervertebral disc    • Depression    • Diverticulosis    • Dyspnea    • Esophageal reflux    • Esophagitis    • Gastritis    • Hematuria    • Hemorrhoids    • Hiatal hernia    • History of arthritis    • History of nuclear stress test     UNSURE OF DATE   • History of peripheral neuropathy    • History of ventricular septal defect    • History of ventricular septal defect    • Hyperlipidemia    • Hypertension    • Infectious diarrhea    • Kyphosis, acquired    • Lumbar radiculopathy    • Mitral and aortic valve disease    • Nephrolithiasis    • Phimosis    • Problems with swallowing     WITH FOOD   • Respiratory failure (CMS/HCC)    • Sleep apnea     BIPAP   • TIA (transient ischemic attack)     X3.       • Ventral hernia    • Wears glasses     FOR READING       Past Surgical history:    Past Surgical History:   Procedure Laterality Date   • CARDIAC CATHETERIZATION     • CARDIAC CATHETERIZATION N/A 2018    Procedure: Left Heart Cath;  Surgeon: Edouard Robertson MD;  Location:  [facility];  Service: Cardiovascular   • CARDIAC ELECTROPHYSIOLOGY PROCEDURE N/A 2019    Procedure: PACEMAKER IMPLANTATION- DC;  Surgeon: Omar Encinas DO;  Location:  [facility] EP [facility];  Service: Cardiology   • ENDOSCOPY N/A 2019    Procedure: ESOPHAGOGASTRODUODENOSCOPY;  Surgeon: Brunner, Mark I, MD;  Location:  [facility];  Service: Gastroenterology   • HERNIA REPAIR     • ORTHOPEDIC SURGERY Left     Left hip arthroplasty   • PACEMAKER IMPLANTATION  2019   • SUPRAPUBIC CATHETER INSERTION      History of Percutaneous Catheter Placement Into Ureter   • THROAT SURGERY      FOR SLEEP APNEA   • VSD REPAIR      History of VSD Repair By Patch Closure       Social History:    Social History     Socioeconomic History   • Marital status:      Spouse name: Not on file   • Number of children: Not on file   • Years of education: Not on file   • Highest education level: Not on file   Occupational History     Employer: RETIRED   Tobacco Use   • Smoking status: Former Smoker     Packs/day: 3.00     Years: 30.00     Pack years: 90.00     Types: Cigarettes     Last attempt to quit: 2017     Years since quittin.8   • Smokeless tobacco: Never Used   Substance and Sexual Activity   • Alcohol use: No   • Drug use: No   • Sexual activity: Defer       Family History:    Family History   Problem Relation Age of Onset   • Other Father         CABG   • Coronary artery disease Father          Allergies:      Mucomyst [acetylcysteine] and Sulfa antibiotics    Home Medications:    Prior to Admission Medications     Prescriptions Last Dose Informant Patient Reported? Taking?    alfuzosin (UROXATRAL) 10  MG 24 hr tablet 11/9/2019  No Yes    Take 1 tablet by mouth Daily.    ALPRAZolam (XANAX) 0.5 MG tablet Past Week  No Yes    TAKE ONE TABLET BY MOUTH ONCE NIGHTLY AS NEEDED FOR ANXIETY    amLODIPine (NORVASC) 5 MG tablet 11/9/2019  No Yes    Take 1 tablet by mouth Daily.    ASPIRIN ADULT LOW STRENGTH 81 MG EC tablet 11/9/2019  No Yes    TAKE ONE TABLET BY MOUTH DAILY    atorvastatin (LIPITOR) 10 MG tablet 11/9/2019  No Yes    Take 1 tablet by mouth Every Night.    clopidogrel (PLAVIX) 75 MG tablet 11/9/2019  No Yes    Take 1 tablet by mouth Daily.    escitalopram (LEXAPRO) 20 MG tablet 11/9/2019  No Yes    Take 1 tablet by mouth Daily.    finasteride (PROSCAR) 5 MG tablet 11/9/2019  No Yes    Take 1 tablet by mouth Daily.    Fluticasone Furoate-Vilanterol (BREO ELLIPTA) 200-25 MCG/INH inhaler 11/9/2019  No Yes    Inhale 1 puff Daily.    furosemide (LASIX) 20 MG tablet 11/9/2019  No Yes    Take 1 tablet by mouth Daily.    ipratropium-albuterol (DUO-NEB) 0.5-2.5 mg/mL nebulizer 11/9/2019  No Yes    Take 3 mL by nebulization Every 6 (Six) Hours.    lisinopril (PRINIVIL,ZESTRIL) 5 MG tablet 11/9/2019  No Yes    Take 1 tablet by mouth Daily.    melatonin 5 MG tablet tablet 11/9/2019  No Yes    ONE BY MOUTH EVERY NIGHT AT BEDTIME    metoprolol tartrate (LOPRESSOR) 25 MG tablet 11/9/2019  No Yes    TAKE ONE TABLET BY MOUTH TWICE A DAY    O2 (OXYGEN)  Self Yes Yes    Inhale 4 L/min 1 (One) Time. Walking on 4L and sitting 3L    pantoprazole (PROTONIX) 40 MG EC tablet 11/9/2019  No Yes    TAKE ONE TABLET BY MOUTH DAILY    predniSONE (DELTASONE) 10 MG tablet 11/9/2019  No Yes    Take 1 tablet by mouth Daily With Breakfast.    QUEtiapine (SEROquel) 25 MG tablet 11/9/2019  No Yes    Take 0.5 tablets by mouth 3 (Three) Times a Day As Needed (anxiety/agitation).    traZODone (DESYREL) 50 MG tablet 11/9/2019  No Yes    Take 1 tablet by mouth Every Night.                 Vital Signs:    Temp:  [97.9 °F (36.6 °C)] 97.9 °F (36.6  °C)  Heart Rate:  [58-75] 64  Resp:  [13-22] 16  BP: ()/(56-71) 127/62        11/10/19  0923 11/10/19  1314   Weight: 76.2 kg (168 lb) 78.7 kg (173 lb 8 oz)       Body mass index is 26.38 kg/m².    Physical Exam:      General Appearance:    Alert and cooperative,  And lying comfortably in bed   Head:    Atraumatic and normocephalic, without obvious abnormality.   Eyes:            PERRLA, conjunctivae and sclerae normal, no Icterus. No pallor. Extraocular movements are within normal limits.   Ears:    Ears appear intact with no abnormalities noted.   Throat:   No oral lesions, no thrush, oral mucosa moist.   Neck:   Supple, trachea midline, no thyromegaly, no carotid bruit, no lymphadenopathy   Lungs:     Chest shape is normal. Breath sounds heard bilaterally equally.  Is mild wheezes more prominent on the right side than the left and with minimal crepitations no Pleural rub or bronchial breathing.      Heart:    Normal S1 and S2, no murmur, no gallop, no rub. No JVD   Abdomen:     Normal bowel sounds, no masses, no organomegaly. Soft        non-tender, non-distended, no guarding, no rebound                tenderness   Extremities:   Moves all extremities well, no edema, no cyanosis, no             clubbing   Skin:  Skin all over the upper and lower extremity shows multiple fluid bruises different stages but worse is over the right thigh medially as well as the left medially there is no skin tear noted   Neurologic:   Cranial nerves 2 - 12 grossly intact, sensation intact, Motor power is normal and equal bilaterally.  His gait has not been checked as he has been falling multiple times but moves all extremities.       EKG:      Patient with right bundle branch block which is been there in the past    Labs:    Lab Results (last 24 hours)     Procedure Component Value Units Date/Time    CK [[id number]]  (Abnormal) Collected:  11/10/19 0935    Specimen:  Blood Updated:  11/10/19 1149     Creatine Kinase 231 U/L      Lactic Acid, Plasma [[id number]]  (Normal) Collected:  11/10/19 1110    Specimen:  Blood Updated:  11/10/19 1139     Lactate 1.1 mmol/L     Urinalysis, Microscopic Only - Urine, Clean Catch [[id number]]  (Abnormal) Collected:  11/10/19 1037    Specimen:  Urine, Clean Catch Updated:  11/10/19 1104     RBC, UA None Seen /HPF      WBC, UA 6-12 /HPF      Bacteria, UA Trace /HPF      Squamous Epithelial Cells, UA 0-2 /HPF      Hyaline Casts, UA 0-2 /LPF      Methodology Manual Light Microscopy    Urine Culture - Urine, Urine, Clean Catch [[id number]] Collected:  11/10/19 1037    Specimen:  Urine, Clean Catch Updated:  11/10/19 1104    Lawn Draw [[id number]] Collected:  11/10/19 0935    Specimen:  Blood Updated:  11/10/19 1045    Narrative:       The following orders were created for panel order Lawn Draw.  Procedure                               Abnormality         Status                     ---------                               -----------         ------                     Light Blue Top[[id number]]                                   Final result               Green Top (Gel)[[id number]]                                  Final result               Lavender Top[[id number]]                                     Final result               Gold Top - SST[[id number]]                                   Final result               Green Top (No Gel)[[id number]]                               In process                   Please view results for these tests on the individual orders.    Light Blue Top [[id number]] Collected:  11/10/19 0935    Specimen:  Blood Updated:  11/10/19 1045     Extra Tube hold for add-on     Comment: Auto resulted       Green Top (Gel) [[id number]] Collected:  11/10/19 0935    Specimen:  Blood Updated:  11/10/19 1045     Extra Tube Hold for add-ons.     Comment: Auto resulted.       Lavender Top [[id number]] Collected:  11/10/19 0935    Specimen:  Blood Updated:  11/10/19 1045     Extra Tube hold for add-on      Comment: Auto resulted       Gold Top - [facility] [[id number]] Collected:  11/10/19 0935    Specimen:  Blood Updated:  11/10/19 1045     Extra Tube Hold for add-ons.     Comment: Auto resulted.       Urinalysis With Culture If Indicated - Urine, Clean Catch [[id number]]  (Abnormal) Collected:  11/10/19 1037    Specimen:  Urine, Clean Catch Updated:  11/10/19 1045     Color, UA Yellow     Appearance, UA Clear     pH, UA <=5.0     Specific Gravity, UA 1.013     Glucose, UA Negative     Ketones, UA Negative     Bilirubin, UA Negative     Blood, UA Negative     Protein, UA Negative     Leuk Esterase, UA Small (1+)     Nitrite, UA Negative     Urobilinogen, UA 0.2 E.U./dL    Blood Culture - Blood, Hand, Right [[id number]] Collected:  11/10/19 1027    Specimen:  Blood from Hand, Right Updated:  11/10/19 1034    Blood Culture - Blood, Hand, Left [[id number]] Collected:  11/10/19 1012    Specimen:  Blood from Hand, Left Updated:  11/10/19 1033    Comprehensive Metabolic Panel [[id number]]  (Abnormal) Collected:  11/10/19 0935    Specimen:  Blood Updated:  11/10/19 1023     Glucose 113 mg/dL      BUN 60 mg/dL      Creatinine 2.77 mg/dL      Sodium 149 mmol/L      Potassium 4.3 mmol/L      Chloride 105 mmol/L      CO2 31.9 mmol/L      Calcium 9.9 mg/dL      Total Protein 6.6 g/dL      Albumin 4.00 g/dL      ALT (SGPT) 15 U/L      AST (SGOT) 21 U/L      Alkaline Phosphatase 74 U/L      Total Bilirubin 0.5 mg/dL      eGFR Non African Amer 23 mL/min/1.73      Globulin 2.6 gm/dL      A/G Ratio 1.5 g/dL      BUN/Creatinine Ratio 21.7     Anion Gap 12.1 mmol/L     Narrative:       GFR Normal >60  Chronic Kidney Disease <60  Kidney Failure <15    Troponin [[id number]]  (Abnormal) Collected:  11/10/19 0935    Specimen:  Blood Updated:  11/10/19 1023     Troponin T 0.033 ng/mL     Narrative:       Troponin T Reference Range:  <= 0.03 ng/mL-   Negative for AMI  >0.03 ng/mL-     Abnormal for myocardial necrosis.  Clinicians would have to utilize  "clinical acumen, EKG, Troponin and serial changes to determine if it is an Acute Myocardial Infarction or myocardial injury due to an underlying chronic condition.     Magnesium [[id number]]  (Normal) Collected:  11/10/19 0935    Specimen:  Blood Updated:  11/10/19 1023     Magnesium 2.1 mg/dL     Procalcitonin [[id number]]  (Normal) Collected:  11/10/19 0935    Specimen:  Blood Updated:  11/10/19 1010     Procalcitonin 0.13 ng/mL     Narrative:       As a Marker for Sepsis (Non-Neonates):   1. <0.5 ng/mL represents a low risk of severe sepsis and/or septic shock.  1. >2 ng/mL represents a high risk of severe sepsis and/or septic shock.    As a Marker for Lower Respiratory Tract Infections that require antibiotic therapy:  PCT on Admission     Antibiotic Therapy             6-12 Hrs later  > 0.5                Strongly Recommended            >0.25 - <0.5         Recommended  0.1 - 0.25           Discouraged                   Remeasure/reassess PCT  <0.1                 Strongly Discouraged          Remeasure/reassess PCT      As 28 day mortality risk marker: \"Change in Procalcitonin Result\" (> 80 % or <=80 %) if Day 0 (or Day 1) and Day 4 values are available. Refer to http://www.[employer]-pct-calculator.com/   Change in PCT <=80 %   A decrease of PCT levels below or equal to 80 % defines a positive change in PCT test result representing a higher risk for 28-day all-cause mortality of patients diagnosed with severe sepsis or septic shock.  Change in PCT > 80 %   A decrease of PCT levels of more than 80 % defines a negative change in PCT result representing a lower risk for 28-day all-cause mortality of patients diagnosed with severe sepsis or septic shock.                CBC & Differential [[id number]] Collected:  11/10/19 0935    Specimen:  Blood Updated:  11/10/19 0953    Narrative:       The following orders were created for panel order CBC & Differential.  Procedure                               Abnormality         " Status                     ---------                               -----------         ------                     CBC Auto Differential[[id number]]        Abnormal            Final result                 Please view results for these tests on the individual orders.    CBC Auto Differential [[id number]]  (Abnormal) Collected:  11/10/19 0935    Specimen:  Blood Updated:  11/10/19 0953     WBC 8.01 10*3/mm3      RBC 3.37 10*6/mm3      Hemoglobin 10.3 g/dL      Hematocrit 33.9 %      .6 fL      MCH 30.6 pg      MCHC 30.4 g/dL      RDW 13.2 %      RDW-SD 49.1 fl      MPV 9.3 fL      Platelets 215 10*3/mm3      Neutrophil % 71.1 %      Lymphocyte % 14.0 %      Monocyte % 8.9 %      Eosinophil % 5.0 %      Basophil % 0.6 %      Immature Grans % 0.4 %      Neutrophils, Absolute 5.70 10*3/mm3      Lymphocytes, Absolute 1.12 10*3/mm3      Monocytes, Absolute 0.71 10*3/mm3      Eosinophils, Absolute 0.40 10*3/mm3      Basophils, Absolute 0.05 10*3/mm3      Immature Grans, Absolute 0.03 10*3/mm3      nRBC 0.0 /100 WBC     Green Top (No Gel) [[id number]] Collected:  11/10/19 0935    Specimen:  Blood Updated:  11/10/19 0937    Blood Gas, Arterial With Co-Ox [[id number]]  (Abnormal) Collected:  11/10/19 0934    Specimen:  Arterial Blood Updated:  11/10/19 0935     Site Right Brachial     Mynor's Test N/A     pH, Arterial 7.323 pH units      Comment: 84 Value below reference range        pCO2, Arterial 62.4 mm Hg      Comment: 83 Value above reference range        pO2, Arterial 86.5 mm Hg      HCO3, Arterial 32.3 mmol/L      Comment: 83 Value above reference range        Base Excess, Arterial 4.9 mmol/L      Comment: 83 Value above reference range        O2 Saturation, Arterial 96.2 %      Hemoglobin, Blood Gas 10.5 g/dL      Comment: 84 Value below reference range        Hematocrit, Blood Gas 32.1 %      Comment: 84 Value below reference range        Oxyhemoglobin 94.3 %      Methemoglobin 1.20 %      Carboxyhemoglobin 0.8 %       A-a Gradiant 64.8 mmHg      Barometric Pressure for Blood Gas 737 mmHg      Modality Nasal Cannula     FIO2 32 %      Flow Rate 3.0 lpm      Ventilator Mode NA     Comment: Meter: [id number]     :  [id number]        Note --     pH, Temp Corrected --     pCO2, Temperature Corrected --     pO2, Temperature Corrected --          Radiology:    Imaging Results (Last 72 Hours)     Procedure Component Value Units Date/Time    CT Chest Without Contrast [[id number]] Collected:  11/10/19 1113     Updated:  11/10/19 1121    Narrative:       PROCEDURE: CT CHEST WO CONTRAST-, CT ABDOMEN PELVIS WO CONTRAST-     HISTORY: fall, right rib pain, shortness of breath     COMPARISON: 12/29/2017.     PROCEDURE: Axial images were obtained from the thoracic inlet through  the pubic symphysis without the use of intravenous contrast.       FINDINGS:      CHEST: There is no evidence of mediastinal or axillary adenopathy. There  are calcified mediastinal lymph nodes from prior granulomatous exposure.  Heart size is enlarged. There are no pleural or pericardial effusions.  Lung windows reveal an 8 mm nodule in the right upper lobe seen on image  24 which is unchanged. There are no focal opacities. There is no  pneumothorax. Scarring is present in the lingula. Bone windows reveal no  acute fractures. There is some respiratory motion artifact noted.     ABDOMEN: The liver is homogeneous in appearance with no focal lesions.  Stones are present within the gallbladder. The spleen is unremarkable.  No adrenal mass is present.  The pancreas is normal. The kidneys are  unremarkable. The aorta is normal in caliber. There is no free fluid or  adenopathy. The abdominal portions of the GI tract are unremarkable.     PELVIS: The appendix is normal. There are colonic diverticula without CT  evidence of diverticulitis. The urinary bladder is unremarkable. There  is no significant free fluid or adenopathy. Bone windows reveal no  acute  fractures. The patient is status post total left hip arthroplasty..       Impression:       No evidence of acute process involving the chest, abdomen or  pelvis.     This study was performed with techniques to keep radiation doses as low  as reasonably achievable (ALARA). Individualized dose reduction  techniques using automated exposure control or adjustment of mA and/or  kV according to the patient size were employed.      This report was finalized on 11/10/2019 11:19 AM by Richelle Patel M.D..    CT Abdomen Pelvis Without Contrast [[id number]] Collected:  11/10/19 1113     Updated:  11/10/19 1121    Narrative:       PROCEDURE: CT CHEST WO CONTRAST-, CT ABDOMEN PELVIS WO CONTRAST-     HISTORY: fall, right rib pain, shortness of breath     COMPARISON: 12/29/2017.     PROCEDURE: Axial images were obtained from the thoracic inlet through  the pubic symphysis without the use of intravenous contrast.       FINDINGS:      CHEST: There is no evidence of mediastinal or axillary adenopathy. There  are calcified mediastinal lymph nodes from prior granulomatous exposure.  Heart size is enlarged. There are no pleural or pericardial effusions.  Lung windows reveal an 8 mm nodule in the right upper lobe seen on image  24 which is unchanged. There are no focal opacities. There is no  pneumothorax. Scarring is present in the lingula. Bone windows reveal no  acute fractures. There is some respiratory motion artifact noted.     ABDOMEN: The liver is homogeneous in appearance with no focal lesions.  Stones are present within the gallbladder. The spleen is unremarkable.  No adrenal mass is present.  The pancreas is normal. The kidneys are  unremarkable. The aorta is normal in caliber. There is no free fluid or  adenopathy. The abdominal portions of the GI tract are unremarkable.     PELVIS: The appendix is normal. There are colonic diverticula without CT  evidence of diverticulitis. The urinary bladder is unremarkable.  There  is no significant free fluid or adenopathy. Bone windows reveal no acute  fractures. The patient is status post total left hip arthroplasty..       Impression:       No evidence of acute process involving the chest, abdomen or  pelvis.     This study was performed with techniques to keep radiation doses as low  as reasonably achievable (ALARA). Individualized dose reduction  techniques using automated exposure control or adjustment of mA and/or  kV according to the patient size were employed.      This report was finalized on 11/10/2019 11:19 AM by Richelle Patel M.D..    CT Head Without Contrast [[id number]] Collected:  11/10/19 1108     Updated:  11/10/19 1115    Narrative:       PROCEDURE: CT HEAD WO CONTRAST-, CT CERVICAL SPINE WO CONTRAST-     HISTORY: fall, on plavix     PROCEDURE: Axial images were obtained from the skull base to the  thoracic inlet by computed tomography. Multiple axial CT images were  performed from the foramen magnum to the vertex without enhancement.          C-SPINE:     FINDINGS: Motion artifact limits the exam. No definitive fracture is  evident. Multilevel degenerative changes noted throughout the cervical  spine. The prevertebral soft tissues are normal.  Limited images of the  lung apices are unremarkable.     HEAD CT:     FINDINGS: The ventricles are normal in size. There is no evidence of  hemorrhage .  No masses are identified.  No extra-axial fluid is seen.   The sinuses are normal.       Impression:       1. No acute intracranial abnormality.  2. Limited examination of the cervical spine secondary to motion  artifact. No gross fracture is evident. If clinical concern persist a  repeat exam is recommended.           1723.10 mGy.cm        This study was performed with techniques to keep radiation doses as low  as reasonably achievable (ALARA). Individualized dose reduction  techniques using automated exposure control or adjustment of mA and/or  kV according to the  patient size were employed.      This report was finalized on 11/10/2019 11:13 AM by Richelle Patel M.D..    CT Cervical Spine Without Contrast [[id number]] Collected:  11/10/19 1108     Updated:  11/10/19 1115    Narrative:       PROCEDURE: CT HEAD WO CONTRAST-, CT CERVICAL SPINE WO CONTRAST-     HISTORY: fall, on plavix     PROCEDURE: Axial images were obtained from the skull base to the  thoracic inlet by computed tomography. Multiple axial CT images were  performed from the foramen magnum to the vertex without enhancement.          C-SPINE:     FINDINGS: Motion artifact limits the exam. No definitive fracture is  evident. Multilevel degenerative changes noted throughout the cervical  spine. The prevertebral soft tissues are normal.  Limited images of the  lung apices are unremarkable.     HEAD CT:     FINDINGS: The ventricles are normal in size. There is no evidence of  hemorrhage .  No masses are identified.  No extra-axial fluid is seen.   The sinuses are normal.       Impression:       1. No acute intracranial abnormality.  2. Limited examination of the cervical spine secondary to motion  artifact. No gross fracture is evident. If clinical concern persist a  repeat exam is recommended.           1723.10 mGy.cm        This study was performed with techniques to keep radiation doses as low  as reasonably achievable (ALARA). Individualized dose reduction  techniques using automated exposure control or adjustment of mA and/or  kV according to the patient size were employed.      This report was finalized on 11/10/2019 11:13 AM by Richelle Patel M.D..          Assessment:    Assessment/Plan       Acute on chronic respiratory failure with hypoxia and hypercapnia (CMS/HCC)    COPD with exacerbation (CMS/HCC)    Repeated falls    Anxiety    Atrial fibrillation (CMS/HCC)    Chronic kidney disease, stage III (moderate) (CMS/HCC)    Essential hypertension    Back pain    Impaired mobility and ADLs    Anemia     Bruise of both arms        Plan:     1.  Acute on chronic respiratory failure with hypercapnia and due to COPD exacerbation-we will start him on IV steroids bronchodilators and oxygen with supportive care.  He will also continue his CPAP or BiPAP at the home unit settings at night and oxygen during the day however nasal cannula to keep to keep saturation above 90%    2.  Repeated falls patient has been having repeated falls and will benefit from a PT OT eval and inpatient rehab transfer as it is multiple falls when stable for discharge    3.  History of atrial fibrillation currently in sinus rhythm.  He cannot get anticoagulation due to multiple falls and has been on Plavix and aspirin and so aspirin will be stopped and only Plavix to be continued    4.  UTI he has complains of dysuria and things has UTI urine culture to be done and will cover with Rocephin till then which will also help with bronco infection though  x-rays yet to be done    Continue rest of the medication as per orders with gentle hydration and avoid medications which would cause more risk for falls and lethargy    Jermaine Perez MD  11/10/19  3:10 PM    Please note that portions of this note may have been completed with a voice recognition program. Efforts were made to edit the dictations, but occasionally words are mistranscribed.

## 2019-11-10 NOTE — ED PROVIDER NOTES
TRIAGE CHIEF COMPLAINT:     Nursing and triage notes reviewed    Chief Complaint   Patient presents with   • Fall   • Shortness of Breath      HPI: Jani Pena is a 69 y.o. male who presents to the emergency department complaining of multiple complaints.  Patient has a history of multiple medical conditions.  Patient recently released from rehab about 1 week previously.  Patient has had 2 falls since that time.  Patient fell in the early hours of the morning and have been stuck in the floor unable to get up.  Patient did hit the right side of his head but denies losing consciousness.  Patient also complains of increasing shortness of breath.  Patient did not wear his BiPAP last night because he was stuck in the floor.  Patient arrives complaining of right side pain and hip pain.  Family states they have concerns patient was getting pneumonia.  Patient does require oxygen at all times.  Patient has a history of heart failure, chronic kidney disease, COPD.  Has not had a fever that family is aware of.  Is having generalized weakness.    REVIEW OF SYSTEMS: All other systems reviewed and are negative     PAST MEDICAL HISTORY:   Past Medical History:   Diagnosis Date   • Abnormal liver enzymes    • Anemia    • Anxiety    • Arthritis    • Atherosclerotic heart disease of native coronary artery without angina pectoris    • Atrial fibrillation (CMS/HCC)    • Back pain    • BPH (benign prostatic hyperplasia)    • Cataract, bilateral    • Chest pain     2-3 MONTHS AGO.  PER PT, HAS ADDRESSED WITH CARDIOLOGIST.  STATES HAS NTG PRN.   • CHF (congestive heart failure) (CMS/HCC)    • Chronic bronchitis (CMS/HCC)    • Chronic kidney disease    • COPD (chronic obstructive pulmonary disease) (CMS/HCC)    • Degeneration of cervical intervertebral disc    • Depression    • Diverticulosis    • Dyspnea    • Esophageal reflux    • Esophagitis    • Gastritis    • Hematuria    • Hemorrhoids    • Hiatal hernia    • History of  arthritis    • History of nuclear stress test     UNSURE OF DATE   • History of peripheral neuropathy    • History of ventricular septal defect    • History of ventricular septal defect    • Hyperlipidemia    • Hypertension    • Infectious diarrhea    • Kyphosis, acquired    • Lumbar radiculopathy    • Mitral and aortic valve disease    • Nephrolithiasis    • Phimosis    • Problems with swallowing     WITH FOOD   • Respiratory failure (CMS/HCC)    • Sleep apnea     BIPAP   • TIA (transient ischemic attack)     X3.   2014   • Ventral hernia    • Wears glasses     FOR READING        FAMILY HISTORY:   Family History   Problem Relation Age of Onset   • Other Father         CABG   • Coronary artery disease Father         SOCIAL HISTORY:   Social History     Socioeconomic History   • Marital status:      Spouse name: Not on file   • Number of children: Not on file   • Years of education: Not on file   • Highest education level: Not on file   Occupational History     Employer: RETIRED   Tobacco Use   • Smoking status: Former Smoker     Packs/day: 3.00     Years: 30.00     Pack years: 90.00     Types: Cigarettes     Last attempt to quit: 2017     Years since quittin.8   • Smokeless tobacco: Never Used   Substance and Sexual Activity   • Alcohol use: No   • Drug use: No   • Sexual activity: Defer        SURGICAL HISTORY:   Past Surgical History:   Procedure Laterality Date   • CARDIAC CATHETERIZATION     • CARDIAC CATHETERIZATION N/A 2018    Procedure: Left Heart Cath;  Surgeon: Edouard Robertson MD;  Location:  [facility] CATH INVASIVE LOCATION;  Service: Cardiovascular   • CARDIAC ELECTROPHYSIOLOGY PROCEDURE N/A 2019    Procedure: PACEMAKER IMPLANTATION- DC;  Surgeon: Omar Encinas DO;  Location:  [facility] EP INVASIVE LOCATION;  Service: Cardiology   • ENDOSCOPY N/A 2019    Procedure: ESOPHAGOGASTRODUODENOSCOPY;  Surgeon: Brunner, Mark I, MD;  Location:  [facility];  Service: Gastroenterology   •  HERNIA REPAIR     • ORTHOPEDIC SURGERY Left     Left hip arthroplasty   • PACEMAKER IMPLANTATION  01/31/2019   • SUPRAPUBIC CATHETER INSERTION      History of Percutaneous Catheter Placement Into Ureter   • THROAT SURGERY      FOR SLEEP APNEA   • VSD REPAIR      History of VSD Repair By Patch Closure        CURRENT MEDICATIONS:      Medication List      ASK your doctor about these medications    acetaminophen 325 MG tablet  Commonly known as:  TYLENOL  Take 2 tablets by mouth Every 4 (Four) Hours As Needed for Mild Pain .     alfuzosin 10 MG 24 hr tablet  Commonly known as:  UROXATRAL  Take 1 tablet by mouth Daily.     ALPRAZolam 0.5 MG tablet  Commonly known as:  XANAX  TAKE ONE TABLET BY MOUTH ONCE NIGHTLY AS NEEDED FOR ANXIETY     amLODIPine 5 MG tablet  Commonly known as:  NORVASC  Take 1 tablet by mouth Daily.     ASPIRIN ADULT LOW STRENGTH 81 MG EC tablet  Generic drug:  aspirin  TAKE ONE TABLET BY MOUTH DAILY     atorvastatin 10 MG tablet  Commonly known as:  LIPITOR  Take 1 tablet by mouth Every Night.     clopidogrel 75 MG tablet  Commonly known as:  PLAVIX  Take 1 tablet by mouth Daily.     escitalopram 20 MG tablet  Commonly known as:  LEXAPRO  Take 1 tablet by mouth Daily.     finasteride 5 MG tablet  Commonly known as:  PROSCAR  Take 1 tablet by mouth Daily.     Fluticasone Furoate-Vilanterol 200-25 MCG/INH inhaler  Commonly known as:  BREO ELLIPTA  Inhale 1 puff Daily.     furosemide 20 MG tablet  Commonly known as:  LASIX  Take 1 tablet by mouth Daily.     ipratropium-albuterol 0.5-2.5 mg/3 ml nebulizer  Commonly known as:  DUO-NEB  Take 3 mL by nebulization Every 6 (Six) Hours.     lisinopril 5 MG tablet  Commonly known as:  PRINIVIL,ZESTRIL  Take 1 tablet by mouth Daily.     melatonin 5 MG tablet tablet  ONE BY MOUTH EVERY NIGHT AT BEDTIME     metoprolol tartrate 25 MG tablet  Commonly known as:  LOPRESSOR  TAKE ONE TABLET BY MOUTH TWICE A DAY     O2  Commonly known as:  OXYGEN     pantoprazole 40  MG EC tablet  Commonly known as:  PROTONIX  TAKE ONE TABLET BY MOUTH DAILY     polyvinyl alcohol 1.4 % ophthalmic solution  Commonly known as:  LIQUIFILM  Administer 1 drop to both eyes Every 1 (One) Hour As Needed for Dry Eyes.     predniSONE 10 MG tablet  Commonly known as:  DELTASONE  Take 1 tablet by mouth Daily With Breakfast.     QUEtiapine 25 MG tablet  Commonly known as:  SEROquel  Take 0.5 tablets by mouth 3 (Three) Times a Day As Needed   (anxiety/agitation).     traZODone 50 MG tablet  Commonly known as:  DESYREL  Take 1 tablet by mouth Every Night.           ALLERGIES: Mucomyst [acetylcysteine] and Sulfa antibiotics     PHYSICAL EXAM:   VITAL SIGNS:   Vitals:    11/10/19 0923   BP: 124/69   Pulse: 75   Resp: 22   SpO2: (!) 82%      CONSTITUTIONAL: Awake, oriented, appears non-toxic   HENT: There is a small abrasion on the right lateral scalp, there is some dried blood around this area but no current active bleeding, oral mucosa pink and moist, airway patent. Nares patent without drainage. External ears normal.   EYES: Conjunctiva clear, EOMI, PERRL   NECK: Trachea midline, non-tender, supple   CARDIOVASCULAR: Normal heart rate, Normal rhythm, No murmurs, rubs, gallops   PULMONARY/CHEST: Overall good air movement with some decreased breath sounds mildly throughout, some crackles in the lower lobes bilateral.  There is tenderness in the right lower lateral ribs.  ABDOMINAL: Non-distended, soft, non-tender - no rebound or guarding.   NEUROLOGIC: Non-focal, moving all four extremities, no gross sensory or motor deficits.   EXTREMITIES: No clubbing, cyanosis, mild edema of the lower extremities bilaterally.  SKIN: Warm, Dry, there are multiple bruises on all extremities including a large hematoma of the upper right inner thigh.    ED COURSE / MEDICAL DECISION MAKING:   Jani Pena is a 69 y.o. male who presents to the emergency department for evaluation of multiple complaints.  Patient normally is on 2  L of oxygen all the time that had to be placed on 4 L according to EMS due to low oxygen saturation.  Patient is breathing more comfortably on 4 L on arrival.  Patient has a large amount of bruising across his body.  Small abrasion on the right lateral scalp.  Will obtain multiple imaging studies given patient's fall and discomfort.  Patient placed on the monitor on arrival.  We will also obtain laboratory testing and EKG.    EKG interpreted by me reveals an atrial electronic pacemaker.  There is a right bundle branch block.  This is an abnormal appearing EKG.  There is a rate of 66 bpm.    Laboratory testing revealed a slightly elevated troponin at 0.03.  Patient's creatinine around his baseline at 2.7.  White blood cell count within normal limits.  There is signs of a small urinary tract infection.  Will start on antibiotics for this.  Patient was placed on BiPAP as he was slightly acidotic with elevated CO2 levels.  Imaging studies did not reveal any acute fractures on images of the head, neck, chest, and abdomen.  No sign of pneumonia.  Treated patient with steroids and breathing treatments.  Will admit to the hospital for further treatment.    DECISION TO DISCHARGE/ADMIT: see ED care timeline     FINAL IMPRESSION:   1 --respiratory failure with hypoxia and hypercapnia  2 --fall  3 --     Electronically signed by: Jacquie Lawson MD, 11/10/2019 9:45 AM       Jacquie Lawson MD  11/10/19 3958

## 2019-11-11 ENCOUNTER — OUTSIDE FACILITY SERVICE (OUTPATIENT)
Dept: INTERNAL MEDICINE | Facility: CLINIC | Age: 69
End: 2019-11-11

## 2019-11-11 ENCOUNTER — APPOINTMENT (OUTPATIENT)
Dept: GENERAL RADIOLOGY | Facility: HOSPITAL | Age: 69
End: 2019-11-11

## 2019-11-11 LAB
A-A DO2: 130.5 MMHG
ALBUMIN SERPL-MCNC: 3.7 G/DL (ref 3.5–5.2)
ALBUMIN/GLOB SERPL: 1.5 G/DL
ALP SERPL-CCNC: 55 U/L (ref 39–117)
ALT SERPL W P-5'-P-CCNC: 13 U/L (ref 1–41)
ANION GAP SERPL CALCULATED.3IONS-SCNC: 12.7 MMOL/L (ref 5–15)
ARTERIAL PATENCY WRIST A: POSITIVE
AST SERPL-CCNC: 16 U/L (ref 1–40)
ATMOSPHERIC PRESS: 734 MMHG
BACTERIA SPEC AEROBE CULT: ABNORMAL
BASE EXCESS BLDA CALC-SCNC: 5.8 MMOL/L (ref 0–2)
BDY SITE: ABNORMAL
BILIRUB SERPL-MCNC: 0.4 MG/DL (ref 0.2–1.2)
BUN BLD-MCNC: 57 MG/DL (ref 8–23)
BUN/CREAT SERPL: 26.3 (ref 7–25)
CALCIUM SPEC-SCNC: 9.2 MG/DL (ref 8.6–10.5)
CHLORIDE SERPL-SCNC: 103 MMOL/L (ref 98–107)
CO2 SERPL-SCNC: 29.3 MMOL/L (ref 22–29)
COHGB MFR BLD: 0.9 % (ref 0–2)
CREAT BLD-MCNC: 2.17 MG/DL (ref 0.76–1.27)
DEPRECATED RDW RBC AUTO: 47.1 FL (ref 37–54)
ERYTHROCYTE [DISTWIDTH] IN BLOOD BY AUTOMATED COUNT: 12.9 % (ref 12.3–15.4)
GAS FLOW AIRWAY: 5 LPM
GFR SERPL CREATININE-BSD FRML MDRD: 30 ML/MIN/1.73
GLOBULIN UR ELPH-MCNC: 2.4 GM/DL
GLUCOSE BLD-MCNC: 146 MG/DL (ref 65–99)
HCO3 BLDA-SCNC: 32.6 MMOL/L (ref 22–28)
HCT VFR BLD AUTO: 31.8 % (ref 37.5–51)
HCT VFR BLD CALC: 31.8 %
HGB BLD-MCNC: 9.6 G/DL (ref 13–17.7)
HGB BLDA-MCNC: 10.4 G/DL (ref 12–18)
HOROWITZ INDEX BLD+IHG-RTO: 40 %
MCH RBC QN AUTO: 29.9 PG (ref 26.6–33)
MCHC RBC AUTO-ENTMCNC: 30.2 G/DL (ref 31.5–35.7)
MCV RBC AUTO: 99.1 FL (ref 79–97)
METHGB BLD QL: 0.8 % (ref 0–1.5)
MODALITY: ABNORMAL
NOTE: ABNORMAL
OXYHGB MFR BLDV: 94.7 % (ref 94–99)
PCO2 BLDA: 58.5 MM HG (ref 35–45)
PCO2 TEMP ADJ BLD: ABNORMAL MM[HG]
PH BLDA: 7.35 PH UNITS (ref 7.3–7.5)
PH, TEMP CORRECTED: ABNORMAL
PLATELET # BLD AUTO: 197 10*3/MM3 (ref 140–450)
PMV BLD AUTO: 9.6 FL (ref 6–12)
PO2 BLDA: 80.1 MM HG (ref 75–100)
PO2 TEMP ADJ BLD: ABNORMAL MM[HG]
POTASSIUM BLD-SCNC: 4.5 MMOL/L (ref 3.5–5.2)
PROT SERPL-MCNC: 6.1 G/DL (ref 6–8.5)
RBC # BLD AUTO: 3.21 10*6/MM3 (ref 4.14–5.8)
SAO2 % BLDCOA: 96.3 % (ref 94–100)
SODIUM BLD-SCNC: 145 MMOL/L (ref 136–145)
VENTILATOR MODE: ABNORMAL
WBC NRBC COR # BLD: 7.45 10*3/MM3 (ref 3.4–10.8)

## 2019-11-11 PROCEDURE — 94799 UNLISTED PULMONARY SVC/PX: CPT

## 2019-11-11 PROCEDURE — 82375 ASSAY CARBOXYHB QUANT: CPT

## 2019-11-11 PROCEDURE — 97116 GAIT TRAINING THERAPY: CPT

## 2019-11-11 PROCEDURE — 99233 SBSQ HOSP IP/OBS HIGH 50: CPT | Performed by: INTERNAL MEDICINE

## 2019-11-11 PROCEDURE — 82805 BLOOD GASES W/O2 SATURATION: CPT

## 2019-11-11 PROCEDURE — 25010000002 METHYLPREDNISOLONE PER 125 MG: Performed by: INTERNAL MEDICINE

## 2019-11-11 PROCEDURE — 36600 WITHDRAWAL OF ARTERIAL BLOOD: CPT

## 2019-11-11 PROCEDURE — G0179 MD RECERTIFICATION HHA PT: HCPCS | Performed by: INTERNAL MEDICINE

## 2019-11-11 PROCEDURE — 80053 COMPREHEN METABOLIC PANEL: CPT | Performed by: INTERNAL MEDICINE

## 2019-11-11 PROCEDURE — 97530 THERAPEUTIC ACTIVITIES: CPT

## 2019-11-11 PROCEDURE — 63710000001 PREDNISONE PER 5 MG: Performed by: INTERNAL MEDICINE

## 2019-11-11 PROCEDURE — 71101 X-RAY EXAM UNILAT RIBS/CHEST: CPT

## 2019-11-11 PROCEDURE — 97150 GROUP THERAPEUTIC PROCEDURES: CPT

## 2019-11-11 PROCEDURE — 97162 PT EVAL MOD COMPLEX 30 MIN: CPT

## 2019-11-11 PROCEDURE — 25010000002 CEFTRIAXONE SODIUM-DEXTROSE 1-3.74 GM-%(50ML) RECONSTITUTED SOLUTION: Performed by: INTERNAL MEDICINE

## 2019-11-11 PROCEDURE — 85027 COMPLETE CBC AUTOMATED: CPT | Performed by: INTERNAL MEDICINE

## 2019-11-11 PROCEDURE — 97165 OT EVAL LOW COMPLEX 30 MIN: CPT

## 2019-11-11 PROCEDURE — 83050 HGB METHEMOGLOBIN QUAN: CPT

## 2019-11-11 RX ORDER — LIDOCAINE 50 MG/G
1 PATCH TOPICAL
Status: DISCONTINUED | OUTPATIENT
Start: 2019-11-11 | End: 2019-11-14 | Stop reason: HOSPADM

## 2019-11-11 RX ADMIN — METHYLPREDNISOLONE SODIUM SUCCINATE 60 MG: 125 INJECTION, POWDER, FOR SOLUTION INTRAMUSCULAR; INTRAVENOUS at 20:16

## 2019-11-11 RX ADMIN — TRAMADOL HYDROCHLORIDE 50 MG: 50 TABLET, FILM COATED ORAL at 20:15

## 2019-11-11 RX ADMIN — IPRATROPIUM BROMIDE AND ALBUTEROL SULFATE 3 ML: .5; 3 SOLUTION RESPIRATORY (INHALATION) at 12:48

## 2019-11-11 RX ADMIN — METOPROLOL TARTRATE 25 MG: 25 TABLET ORAL at 20:15

## 2019-11-11 RX ADMIN — HYDROCODONE BITARTRATE AND ACETAMINOPHEN 1 TABLET: 5; 325 TABLET ORAL at 17:53

## 2019-11-11 RX ADMIN — CYCLOBENZAPRINE HYDROCHLORIDE 5 MG: 10 TABLET, FILM COATED ORAL at 09:12

## 2019-11-11 RX ADMIN — BUDESONIDE AND FORMOTEROL FUMARATE DIHYDRATE 2 PUFF: 80; 4.5 AEROSOL RESPIRATORY (INHALATION) at 06:47

## 2019-11-11 RX ADMIN — PANTOPRAZOLE SODIUM 40 MG: 40 TABLET, DELAYED RELEASE ORAL at 06:25

## 2019-11-11 RX ADMIN — ASPIRIN 81 MG: 81 TABLET, COATED ORAL at 08:57

## 2019-11-11 RX ADMIN — CEFTRIAXONE 1 G: 1 INJECTION, SOLUTION INTRAVENOUS at 12:43

## 2019-11-11 RX ADMIN — IPRATROPIUM BROMIDE AND ALBUTEROL SULFATE 3 ML: .5; 3 SOLUTION RESPIRATORY (INHALATION) at 23:59

## 2019-11-11 RX ADMIN — LISINOPRIL 5 MG: 5 TABLET ORAL at 08:57

## 2019-11-11 RX ADMIN — TRAMADOL HYDROCHLORIDE 50 MG: 50 TABLET, FILM COATED ORAL at 09:12

## 2019-11-11 RX ADMIN — SODIUM CHLORIDE, PRESERVATIVE FREE 10 ML: 5 INJECTION INTRAVENOUS at 08:58

## 2019-11-11 RX ADMIN — TRAZODONE HYDROCHLORIDE 50 MG: 50 TABLET ORAL at 20:15

## 2019-11-11 RX ADMIN — FUROSEMIDE 20 MG: 20 TABLET ORAL at 08:57

## 2019-11-11 RX ADMIN — IPRATROPIUM BROMIDE AND ALBUTEROL SULFATE 3 ML: .5; 3 SOLUTION RESPIRATORY (INHALATION) at 06:47

## 2019-11-11 RX ADMIN — FINASTERIDE 5 MG: 5 TABLET, FILM COATED ORAL at 08:57

## 2019-11-11 RX ADMIN — BUDESONIDE AND FORMOTEROL FUMARATE DIHYDRATE 2 PUFF: 80; 4.5 AEROSOL RESPIRATORY (INHALATION) at 20:20

## 2019-11-11 RX ADMIN — PREDNISONE 10 MG: 10 TABLET ORAL at 08:58

## 2019-11-11 RX ADMIN — METOPROLOL TARTRATE 25 MG: 25 TABLET ORAL at 08:57

## 2019-11-11 RX ADMIN — ATORVASTATIN CALCIUM 10 MG: 10 TABLET, FILM COATED ORAL at 20:15

## 2019-11-11 RX ADMIN — AMLODIPINE BESYLATE 5 MG: 5 TABLET ORAL at 08:57

## 2019-11-11 RX ADMIN — LIDOCAINE 1 PATCH: 50 PATCH CUTANEOUS at 12:43

## 2019-11-11 RX ADMIN — HYDROCODONE BITARTRATE AND ACETAMINOPHEN 1 TABLET: 5; 325 TABLET ORAL at 06:25

## 2019-11-11 RX ADMIN — IPRATROPIUM BROMIDE AND ALBUTEROL SULFATE 3 ML: .5; 3 SOLUTION RESPIRATORY (INHALATION) at 00:54

## 2019-11-11 RX ADMIN — IPRATROPIUM BROMIDE AND ALBUTEROL SULFATE 3 ML: .5; 3 SOLUTION RESPIRATORY (INHALATION) at 20:06

## 2019-11-11 RX ADMIN — CYCLOBENZAPRINE HYDROCHLORIDE 5 MG: 10 TABLET, FILM COATED ORAL at 20:16

## 2019-11-11 RX ADMIN — ESCITALOPRAM OXALATE 10 MG: 10 TABLET ORAL at 08:57

## 2019-11-11 RX ADMIN — METHYLPREDNISOLONE SODIUM SUCCINATE 60 MG: 125 INJECTION, POWDER, FOR SOLUTION INTRAMUSCULAR; INTRAVENOUS at 08:58

## 2019-11-11 RX ADMIN — SODIUM CHLORIDE, PRESERVATIVE FREE 10 ML: 5 INJECTION INTRAVENOUS at 20:16

## 2019-11-11 RX ADMIN — TAMSULOSIN HYDROCHLORIDE 0.4 MG: 0.4 CAPSULE ORAL at 20:15

## 2019-11-11 NOTE — PROGRESS NOTES
Discharge Planning Assessment   [facility]     Patient Name: Jani Pena  MRN: [id number]  Today's Date: 11/11/2019    Admit Date: 11/10/2019    Discharge Needs Assessment     Row Name 11/11/19 1557       Living Environment    Lives With  alone    Current Living Arrangements  home/apartment/condo    Potentially Unsafe Housing Conditions  -- Pt denies concerns    Primary Care Provided by  self    Provides Primary Care For  no one, unable/limited ability to care for self    Family Caregiver if Needed  none    Family Caregiver Names  Brother, Jean Marie and pt's mother help as needed.    Quality of Family Relationships  supportive    Able to Return to Prior Arrangements  other (see comments)    Living Arrangement Comments  Currently lives alone, but feels he needs assistance.  PT has recommended rehab.  Pt is waiting to get an apartment at assisted living.       Resource/Environmental Concerns    Resource/Environmental Concerns  none Pt denies any financial concerns for food, utilities and medications.       Transition Planning    Patient/Family Anticipates Transition to  inpatient rehabilitation facility    Patient/Family Anticipated Services at Transition  rehabilitation services    Transportation Anticipated  family or friend will provide       Discharge Needs Assessment    Readmission Within the Last 30 Days  no previous admission in last 30 days    Concerns to be Addressed  denies needs/concerns at this time    Equipment Currently Used at Home  walker, rolling;oxygen;bipap/cpap 4L continuous    Anticipated Changes Related to Illness  none    Equipment Needed After Discharge  other (see comments) Per daughter, pt has a WC on order from PCP, but has not received it yet.    Outpatient/Agency/Support Group Needs  homecare agency [facility]    Offered/Gave Vendor List  no already established.        Discharge Plan     Row Name 11/11/19 1600       Plan    Plan  Home with HH vs Rehab    Patient/Family in Agreement  with Plan  yes    Plan Comments Spoke with pt in room re DCP; his daughter is at bedside.  Pt lives in a house alone.  He reports that he is trying to get an apartment at an assisted living facility.  Pt reports that he just got out of rehab 11/2/19 ([facility] near [place]).  Pt currently has [facility] seeing him for nursing, PT/OT, and speech.  Pt uses oxygen at 4L continuously supplied by [employer] and has a Cpap and rolling walker.  Daughter reports that PCP has ordered pt a WC, but he has not received it yet.  Pt would like to return to rehab closer to home, but not sure if he has any days left.  Daughter states that pt did okay with [facility] and request referral be sent to them.  Address, phone & PCP verified.  Pt denies AD, but states that he is working on it with [facility].  CM will continue to follow.       Called Misael vergara with [facility].  Referral info given.  She reports that she will pull info from [employer] and check insurance.    Row Name 11/11/19 2906       Plan    Plan  Consult for living will. Spoke to pt. and he has a living will but wants to change it. Provided pt. with pamphlet, discussed completion. He will review and SW will visit tomorrow afternoon to complete.        Destination      No service coordination in this encounter.      Durable Medical Equipment      No service coordination in this encounter.      Dialysis/Infusion      No service coordination in this encounter.      Home Medical Care      No service coordination in this encounter.      Therapy      No service coordination in this encounter.      Community Resources      No service coordination in this encounter.        Expected Discharge Date and Time     Expected Discharge Date Expected Discharge Time    Nov 13, 2019         Demographic Summary     Row Name 11/11/19 6624       General Information    Admission Type  inpatient    Arrived From  emergency department    Referral Source  admission list    Reason for Consult   discharge planning    Preferred Language  English     Used During This Interaction  no        Functional Status     Row Name 11/11/19 1556       Functional Status    Usual Activity Tolerance  fair       Functional Status, IADL    Medications  independent    Meal Preparation  assistive person    Housekeeping  assistive person    Laundry  assistive person    Shopping  assistive person       Mental Status    General Appearance WDL  WDL       Mental Status Summary    Recent Changes in Mental Status/Cognitive Functioning  no changes       Employment/    Employment Status  retired        Psychosocial    No documentation.       Abuse/Neglect    No documentation.       Legal    No documentation.       Substance Abuse    No documentation.       Patient Forms    No documentation.           Danielle Lundy RN

## 2019-11-11 NOTE — PLAN OF CARE
Problem: Patient Care Overview  Goal: Plan of Care Review  Outcome: Ongoing (interventions implemented as appropriate)   11/11/19 0331   Coping/Psychosocial   Plan of Care Reviewed With patient   Plan of Care Review   Progress no change   OTHER   Outcome Summary NEW ADMISSION, PT COMPLIANT TO CARE,        Problem: Fall Risk (Adult)  Goal: Identify Related Risk Factors and Signs and Symptoms  Outcome: Outcome(s) achieved Date Met: 11/11/19    Goal: Absence of Fall  Outcome: Ongoing (interventions implemented as appropriate)      Problem: Skin Injury Risk (Adult)  Goal: Identify Related Risk Factors and Signs and Symptoms  Outcome: Outcome(s) achieved Date Met: 11/11/19    Goal: Skin Health and Integrity  Outcome: Ongoing (interventions implemented as appropriate)      Problem: Pain, Chronic (Adult)  Goal: Identify Related Risk Factors and Signs and Symptoms  Outcome: Outcome(s) achieved Date Met: 11/11/19    Goal: Acceptable Pain/Comfort Level and Functional Ability  Outcome: Ongoing (interventions implemented as appropriate)

## 2019-11-11 NOTE — PROGRESS NOTES
Hospitalist Progress Note.    LOS: 1 day    Patient Care Team:  Miguel Rojas MD as PCP - General  Miguel Rojas MD as PCP - Family Medicine  Ulysses Bass MD as Cardiologist (Cardiology)    Chief Complaint:    Follow-up on hypercarbic respiratory failure    SUBJECTIVE:  Patient seen and examined this morning.  Events from last night noted.  He is feeling better this morning.  Planing of pain in his right lower back which is worse with coughing. There does not appear to be any bruising on exam but he is quite tender to palpation.  A CT of his chest and abdomen reviewed in did not appreciate any abnormal findings.  His urinalysis on admission was suggestive of an UTI and his urine cultures growing Corynebacterium.  He is currently on ceftriaxone.  ABG shows improvement in his CO2 levels as well as his pH.    While the patient did not mention to me anything about difficulty with swallowing, nursing staff did notice that he was having some trouble swallowing his food.  He then reported that he recently had an EGD done prior to leaving [place] where he did have his esophagus dilated.  He feels that there has been no improvement despite this procedure.  He does not know the results of the biopsy as he had.    Hospital course:  Patient is a 69-year-old male with medical history of COPD on home oxygen, sleep apnea on CPAP, A. fib not on anticoagulation due to multiple falls, chronic kidney disease, hypertension, chronic back pain was brought into the ER after sustaining a fall at home.  He also reported shortness of breath, generalized weakness and back pain.  He has only apparently been home for a week after returning from short-term rehab in [place].  So far he has already fallen twice at home resulting in multiple bruises in his bilateral upper extremities.  He reported falling asleep the night before presentation and ending up on the floor where he remained until he was found by 1 of the home health nurses..  " In the ER, he had normal vitals and labs showed elevated CO2 level at 63 with a pH of 7.32.  He reported not being able to wear his BiPAP due to being on the floor for the whole night.  His urinalysis was suggestive of possible UTI so the patient was placed on BiPAP, given steroids and antibiotics and admitted to the hospitalist service.    Review of Systems:    The pertinent  ROS was done and it is noted above, rest  was negative.    OBJECTIVE:    Vital Signs  /53 (BP Location: Right arm, Patient Position: Lying)   Pulse 72   Temp 98.1 °F (36.7 °C) (Oral)   Resp 20   Ht 172.7 cm (68\")   Wt 80.6 kg (177 lb 11.2 oz)   SpO2 97%   BMI 27.02 kg/m²       I/O this shift:  In: 120 [P.O.:120]  Out: -     Intake/Output Summary (Last 24 hours) at 11/11/2019 1337  Last data filed at 11/11/2019 0900  Gross per 24 hour   Intake 340 ml   Output 1350 ml   Net -1010 ml       Physical Exam:    General Appearance: alert, oriented x 3, no acute distress,   HEENT: pupils round and reactive to light, oral mucosa dry, extraocular movements intact.  Neck: supple, no JVD, trachea midline  Lungs: Clear to Auscultation, unlabored breathing effort  Heart: RRR, normal S1 and S2, no S3, no rub  Abdomen: soft, non-tender, no palpable bladder, present bowel sounds to auscultation  Extremities: no edema, cyanosis or clubbing.   Neuro: normal speech and mental status, grossly non focal.     Results Review:    Results from last 7 days   Lab Units 11/11/19  0525 11/10/19  0935 11/08/19  1304 11/06/19  1241   SODIUM mmol/L 145 149* 142 148*   POTASSIUM mmol/L 4.5 4.3 4.8 4.7   CHLORIDE mmol/L 103 105 98 103   TOTAL CO2 mmol/L  --   --   --  30.8*   CO2 mmol/L 29.3* 31.9* 33.9*  --    BUN mg/dL 57* 60* 50* 45*   CREATININE mg/dL 2.17* 2.77* 2.53* 2.28*   CALCIUM mg/dL 9.2 9.9 9.8 9.9   BILIRUBIN mg/dL 0.4 0.5  --  0.5   ALK PHOS U/L 55 74  --  64   ALT (SGPT) U/L 13 15  --  11   AST (SGOT) U/L 16 21  --  15   GLUCOSE mg/dL 146* 113* 95  " --        Estimated Creatinine Clearance: 36.6 mL/min (A) (by C-G formula based on SCr of 2.17 mg/dL (H)).    Results from last 7 days   Lab Units 11/10/19  0935   MAGNESIUM mg/dL 2.1             Results from last 7 days   Lab Units 11/11/19  0525 11/10/19  0935 11/08/19  1304 11/06/19  1241   WBC 10*3/mm3 7.45 8.01 8.86 9.01   HEMOGLOBIN g/dL 9.6* 10.3* 10.2* 11.4*   PLATELETS 10*3/mm3 197 215 232 239               Imaging Results (Last 24 Hours)     Procedure Component Value Units Date/Time    XR Ribs Right With PA Chest [[id number]] Collected:  11/11/19 1131     Updated:  11/11/19 1136    Narrative:       PROCEDURE: XR RIBS RIGHT W PA CHEST-     HISTORY: lower back rib pain, worse with coughing, recent fall;  J96.21-Acute and chronic respiratory failure with hypoxia; J96.22-Acute  and chronic respiratory failure with hypercapnia     FINDINGS:     CHEST: Single view of the chest demonstrates a normal heart size and  mediastinum. Note is made of an PFO closure device in place. The lungs  are clear with no focal opacities were effusions. There is no  pneumothorax. A left subclavian pacemaker is in place and there is a  right sided carotid body stimulator.     RIBS: 3 views of the ribs demonstrate no displaced rib fracture.       Impression:       No acute process.        This report was finalized on 11/11/2019 11:33 AM by Richelle Patel M.D..          amLODIPine 5 mg Oral Q24H   aspirin 81 mg Oral Daily   atorvastatin 10 mg Oral Nightly   budesonide-formoterol 2 puff Inhalation BID - RT   ceftriaxone 1 g Intravenous Q24H   escitalopram 10 mg Oral Daily   finasteride 5 mg Oral Daily   furosemide 20 mg Oral Daily   ipratropium-albuterol 3 mL Nebulization Q6H - RT   lidocaine 1 patch Transdermal Q24H   lisinopril 5 mg Oral Daily   methylPREDNISolone sodium succinate 60 mg Intravenous Q12H   metoprolol tartrate 25 mg Oral BID   pantoprazole 40 mg Oral Q AM   predniSONE 10 mg Oral Daily With Breakfast   tamsulosin  0.4 mg Oral Nightly   traZODone 50 mg Oral Nightly          Medication Review:   Current Facility-Administered Medications   Medication Dose Route Frequency Provider Last Rate Last Dose   • ALPRAZolam (XANAX) tablet 0.5 mg  0.5 mg Oral Nightly PRN Jermaine Perez MD   0.5 mg at 11/10/19 2332   • amLODIPine (NORVASC) tablet 5 mg  5 mg Oral Q24H Jermaine Perez MD   5 mg at 11/11/19 0857   • aspirin EC tablet 81 mg  81 mg Oral Daily Jermaine Perez MD   81 mg at 11/11/19 0857   • atorvastatin (LIPITOR) tablet 10 mg  10 mg Oral Nightly Jermaine Perez MD   10 mg at 11/10/19 2100   • budesonide-formoterol (SYMBICORT) 80-4.5 MCG/ACT inhaler 2 puff  2 puff Inhalation BID - RT Jermaine Perez MD   2 puff at 11/11/19 0647   • cefTRIAXone (ROCEPHIN) IVPB 1 g/50ml dextrose (premix)  1 g Intravenous Q24H Jermaine Perez  mL/hr at 11/11/19 1243 1 g at 11/11/19 1243   • cyclobenzaprine (FLEXERIL) tablet 5 mg  5 mg Oral TID PRN Kaylee Michael DO   5 mg at 11/11/19 0912   • escitalopram (LEXAPRO) tablet 10 mg  10 mg Oral Daily Jermaine Perez MD   10 mg at 11/11/19 0857   • finasteride (PROSCAR) tablet 5 mg  5 mg Oral Daily Jermaine Perez MD   5 mg at 11/11/19 0857   • furosemide (LASIX) tablet 20 mg  20 mg Oral Daily Jermaine Perez MD   20 mg at 11/11/19 0857   • HYDROcodone-acetaminophen (NORCO) 5-325 MG per tablet 1 tablet  1 tablet Oral Q6H PRN Jermaine Perez MD   1 tablet at 11/11/19 0625   • ipratropium-albuterol (DUO-NEB) nebulizer solution 3 mL  3 mL Nebulization Q6H - RT Jermaine Perez MD   3 mL at 11/11/19 1248   • lidocaine (LIDODERM) 5 % 1 patch  1 patch Transdermal Q24H Ottoniel Hill MD   1 patch at 11/11/19 1243   • lisinopril (PRINIVIL,ZESTRIL) tablet 5 mg  5 mg Oral Daily Jermaine Perez MD   5 mg at 11/11/19 0857   • methylPREDNISolone sodium succinate (SOLU-Medrol) injection 60 mg  60 mg Intravenous Q12H Jermaine Perez MD   60 mg at 11/11/19 0858   • metoprolol tartrate (LOPRESSOR)  tablet 25 mg  25 mg Oral BID Jermaine Perez MD   25 mg at 11/11/19 0857   • pantoprazole (PROTONIX) EC tablet 40 mg  40 mg Oral Q AM Jermaine Perez MD   40 mg at 11/11/19 0625   • predniSONE (DELTASONE) tablet 10 mg  10 mg Oral Daily With Breakfast Jermaine Perez MD   10 mg at 11/11/19 0858   • QUEtiapine (SEROquel) tablet 12.5 mg  12.5 mg Oral TID PRN Jermaine Perez MD       • sodium chloride 0.9 % flush 10 mL  10 mL Intravenous PRN Emergency, Triage Protocol, MD   10 mL at 11/11/19 0858   • tamsulosin (FLOMAX) 24 hr capsule 0.4 mg  0.4 mg Oral Nightly Jermaine Perez MD   0.4 mg at 11/10/19 2100   • traMADol (ULTRAM) tablet 50 mg  50 mg Oral Q4H PRN Kaylee Michael DO   50 mg at 11/11/19 0912   • traZODone (DESYREL) tablet 50 mg  50 mg Oral Nightly Jermaine Perez MD   50 mg at 11/10/19 2100       ASSESSMENT/PLAN:    Acute on chronic respiratory failure with hypoxia and hypercapnia (CMS/HCC)    Anxiety    Atrial fibrillation (CMS/HCC)    Chronic kidney disease, stage III (moderate) (CMS/HCC)    Essential hypertension    Back pain    COPD with exacerbation (CMS/HCC)    Impaired mobility and ADLs    Anemia    Repeated falls    Bruise of both arms    Was able to review the patient's EGD report from Dr. aHrpreet Velazquez in [facility].  Per the operative report, the patient was found to have findings consistent with possible eosinophilic esophagitis, biopsies were taken.  He did not have any evidence of esophageal stricture and they did perform empiric dilation of his esophagus.  I called his office to obtain results of the biopsy but they are not yet available.  Patient tells me that he did not have any significant improvement even after the dilation.  For now, I will continue him on a dysphagia diet.  I do not see up point in repeating it endoscopy as he had one done just 10 days ago.  We will have him follow-up with his gastroenterologist.    Patient's urine culture grew out Corynebacterium.  He will be  continued on ceftriaxone to finish a 5-day course of antibiotics.    I did obtain x-rays of his right side to ensure that he did not have a broken rib and this was negative.  CT chest and abdomen did not reveal any acute pathology.  I suspect that he may have bruised the area resulting in persistent pain.  I will order lidocaine patch to the area.    Patient will be evaluated by PT and OT.  Long discussion held with the patient regarding discharge disposition.  He is interested in trying to get into a assisted living facility.  He was not opposed to long-term placement but prefers either short-term or assisted living first and would only consider long-term as his last resort.  I have discussed this with case management.     Anticipate the patient will be medically ready in the next 24 hours.  Details were discussed with the patient.  I also discussed the details with the nursing staff.    Rest as ordered.    Ottoniel Hill MD  11/11/19  1:37 PM    Please note that portions of this note may have been completed with a voice recognition program. Efforts were made to edit the dictations, but occasionally words are mistranscribed.

## 2019-11-11 NOTE — SIGNIFICANT NOTE
Order received, but hold per MD until third stage dysphagia needs are determined/addressed.  SLP available if needed.

## 2019-11-11 NOTE — PLAN OF CARE
Problem: Patient Care Overview  Goal: Plan of Care Review  Outcome: Ongoing (interventions implemented as appropriate)   11/11/19 3085   Coping/Psychosocial   Plan of Care Reviewed With patient;daughter   Plan of Care Review   Progress no change   OTHER   Outcome Summary Pt seen for OT evaluation today. Pt presents with weakness and decreased independence with self care and functional mobility tasks. Pt is expected to benefit from skilled OT to improve his strength and independence with ADL tasks

## 2019-11-11 NOTE — THERAPY EVALUATION
Patient Name: Jani Pena  : 1950    MRN: [id number]                              Today's Date: 2019       Admit Date: 11/10/2019    Visit Dx:     ICD-10-CM ICD-9-CM   1. Acute on chronic respiratory failure with hypoxia and hypercapnia (CMS/HCC) J96.21 518.84    J96.22 786.09     799.02   2. Impaired mobility and ADLs Z74.09 799.89     Patient Active Problem List   Diagnosis   • Anxiety   • Atrial fibrillation (CMS/HCC)   • Chronic kidney disease, stage III (moderate) (CMS/HCC)   • Steroid-dependent COPD (CMS/HCC)   • Degeneration of cervical intervertebral disc   • GERD without esophagitis   • Diverticulosis   • Hemorrhoids   • Hiatal hernia   • Hyperlipidemia   • Essential hypertension   • Kyphosis, acquired   • PEDRO on CPAP   • Benign prostatic hyperplasia with incomplete bladder emptying   • History of arthritis   • Back pain   • Depression   • AQUINO (dyspnea on exertion)   • History of ventricular septal defect   • COPD with exacerbation (CMS/HCC)   • Impaired mobility and ADLs   • Chronic diastolic congestive heart failure (CMS/HCC)   • Coronary artery disease involving native coronary artery of native heart with angina pectoris (CMS/HCC)   • Lumbar radiculopathy   • Dysphagia   • Anemia   • Cardiac pacemaker in situ   • Sick sinus syndrome (CMS/HCC)   • Polypharmacy   • Chronic respiratory failure with hypoxia and hypercapnia (CMS/HCC)   • Hematoma   • Acute on chronic respiratory failure with hypoxia and hypercapnia (CMS/HCC)   • Repeated falls   • Bruise of both arms     Past Medical History:   Diagnosis Date   • Abnormal liver enzymes    • Anemia    • Anxiety    • Arthritis    • Atherosclerotic heart disease of native coronary artery without angina pectoris    • Atrial fibrillation (CMS/HCC)    • Back pain    • BPH (benign prostatic hyperplasia)    • Cataract, bilateral    • Chest pain     2-3 MONTHS AGO.  PER PT, HAS ADDRESSED WITH CARDIOLOGIST.  STATES HAS NTG PRN.   • CHF (congestive  heart failure) (CMS/HCC)    • Chronic bronchitis (CMS/HCC)    • Chronic kidney disease    • COPD (chronic obstructive pulmonary disease) (CMS/HCC)    • Degeneration of cervical intervertebral disc    • Depression    • Diverticulosis    • Dyspnea    • Esophageal reflux    • Esophagitis    • Gastritis    • Hematuria    • Hemorrhoids    • Hiatal hernia    • History of arthritis    • History of nuclear stress test     UNSURE OF DATE   • History of peripheral neuropathy    • History of ventricular septal defect    • History of ventricular septal defect    • Hyperlipidemia    • Hypertension    • Impaired functional mobility, balance, gait, and endurance    • Infectious diarrhea    • Kyphosis, acquired    • Lumbar radiculopathy    • Mitral and aortic valve disease    • Nephrolithiasis    • Phimosis    • Problems with swallowing     WITH FOOD   • Respiratory failure (CMS/HCC)    • Sleep apnea     BIPAP   • TIA (transient ischemic attack)     X3.   2014   • Ventral hernia    • Wears glasses     FOR READING     Past Surgical History:   Procedure Laterality Date   • CARDIAC CATHETERIZATION     • CARDIAC CATHETERIZATION N/A 5/24/2018    Procedure: Left Heart Cath;  Surgeon: Edouard Robertson MD;  Location:  [facility] CATH INVASIVE LOCATION;  Service: Cardiovascular   • CARDIAC ELECTROPHYSIOLOGY PROCEDURE N/A 1/31/2019    Procedure: PACEMAKER IMPLANTATION- DC;  Surgeon: Omar Encinas DO;  Location:  [facility] EP INVASIVE LOCATION;  Service: Cardiology   • ENDOSCOPY N/A 1/26/2019    Procedure: ESOPHAGOGASTRODUODENOSCOPY;  Surgeon: Brunner, Mark I, MD;  Location:  [facility] ENDOSCOPY;  Service: Gastroenterology   • HERNIA REPAIR     • ORTHOPEDIC SURGERY Left     Left hip arthroplasty   • PACEMAKER IMPLANTATION  01/31/2019   • SUPRAPUBIC CATHETER INSERTION      History of Percutaneous Catheter Placement Into Ureter   • THROAT SURGERY      FOR SLEEP APNEA   • VSD REPAIR      History of VSD Repair By Patch Closure     General Information     Row  Name 11/11/19 1438          PT Evaluation Time/Intention    Document Type  evaluation  (Pended)   -MR     Mode of Treatment  physical therapy  (Pended)   -MR Goodman Name 11/11/19 1438          General Information    Patient Profile Reviewed?  yes  (Pended)   -MR     Prior Level of Function  independent:;all household mobility  (Pended)   -MR     Existing Precautions/Restrictions  oxygen therapy device and L/min;fall  (Pended)   -MR     Barriers to Rehab  none identified  (Pended)   -MR Goodman Name 11/11/19 1438          Relationship/Environment    Lives With  alone  (Pended)   -MR Goodman Name 11/11/19 1438          Resource/Environmental Concerns    Current Living Arrangements  home/apartment/condo  (Pended)   -MR Goodman Name 11/11/19 1438          Home Main Entrance    Number of Stairs, Main Entrance  one  (Pended)   -MR     Stair Railings, Main Entrance  none  (Pended)   -MR Goodman Name 11/11/19 1438          Stairs Within Home, Primary    Number of Stairs, Within Home, Primary  none  (Pended)   -MR Goodman Name 11/11/19 1438          Cognitive Assessment/Intervention- PT/OT    Orientation Status (Cognition)  oriented x 4  (Pended)   -MR Goodman Name 11/11/19 1438          Safety Issues, Functional Mobility    Safety Issues Affecting Function (Mobility)  safety precaution awareness  (Pended)   -MR     Impairments Affecting Function (Mobility)  balance;coordination;endurance/activity tolerance;strength;pain  (Pended)   -MR       User Key  (r) = Recorded By, (t) = Taken By, (c) = Cosigned By    Initials Name Provider Type    MR Zion Hermelinda, PT Student PT Student        Mobility     Row Name 11/11/19 1438          Bed Mobility Assessment/Treatment    Bed Mobility Assessment/Treatment  supine-sit  (Pended)   -MR     Supine-Sit [place] (Bed Mobility)  contact guard  (Pended)   -MR     Assistive Device (Bed Mobility)  head of bed elevated  (Pended)   -MR Goodman Name 11/11/19 1438          Sit-Stand Transfer     Sit-Stand [place] (Transfers)  minimum assist (75% patient effort);2 person assist  (Pended)   -MR     Assistive Device (Sit-Stand Transfers)  walker, front-wheeled  (Pended)   -MR     Row Name 11/11/19 1438          Gait/Stairs Assessment/Training    Gait/Stairs Assessment/Training  gait/ambulation assistive device  (Pended)   -MR     [place] Level (Gait)  minimum assist (75% patient effort)  (Pended)   -MR     Assistive Device (Gait)  walker, front-wheeled  (Pended)   -MR     Distance in Feet (Gait)  40' x 2  (Pended)   -MR     Pattern (Gait)  step-through  (Pended)   -MR     Deviations/Abnormal Patterns (Gait)  base of support, narrow;pati decreased;festinating/shuffling;gait speed decreased;stride length decreased  (Pended)   -MR     Bilateral Gait Deviations  forward flexed posture  (Pended)  Decreased toe off  -MR       User Key  (r) = Recorded By, (t) = Taken By, (c) = Cosigned By    Initials Name Provider Type    Hermelinda Ramos, PT Student PT Student        Obj/Interventions     Row Name 11/11/19 1438          General ROM    GENERAL ROM COMMENTS  BLE WFL  (Pended)   -MR     Row Name 11/11/19 1438          MMT (Manual Muscle Testing)    General MMT Comments  BLE 3/5  (Pended)   -MR     Row Name 11/11/19 1438          Static Standing Balance    Level of [place] (Supported Standing, Static Balance)  contact guard assist  (Pended)   -MR     Time Able to Maintain Position (Supported Standing, Static Balance)  1 to 2 minutes  (Pended)   -MR     Assistive Device Utilized (Supported Standing, Static Balance)  walker, rolling  (Pended)   -MR       User Key  (r) = Recorded By, (t) = Taken By, (c) = Cosigned By    Initials Name Provider Type    Heremlinda Ramos, PT Student PT Student        Goals/Plan     Row Name 11/11/19 1438          Bed Mobility Goal 1 (PT)    Activity/Assistive Device (Bed Mobility Goal 1, PT)  bed mobility activities, all  (Pended)   -MR     [place] Level/Cues Needed  (Bed Mobility Goal 1, PT)  conditional independence  (Pended)   -MR     Time Frame (Bed Mobility Goal 1, PT)  2 weeks  (Pended)   -MR     Progress/Outcomes (Bed Mobility Goal 1, PT)  continuing progress toward goal  (Pended)   -MR     Row Name 11/11/19 1438          Transfer Goal 1 (PT)    Activity/Assistive Device (Transfer Goal 1, PT)  sit-to-stand/stand-to-sit;bed-to-chair/chair-to-bed;walker, rolling  (Pended)   -MR     [place] Level/Cues Needed (Transfer Goal 1, PT)  contact guard assist  (Pended)   -MR     Time Frame (Transfer Goal 1, PT)  2 weeks  (Pended)   -MR     Progress/Outcome (Transfer Goal 1, PT)  continuing progress toward goal  (Pended)   -MR     Row Name 11/11/19 1438          Gait Training Goal 1 (PT)    Activity/Assistive Device (Gait Training Goal 1, PT)  gait (walking locomotion);walker, rolling;increase endurance/gait distance  (Pended)   -MR     [place] Level (Gait Training Goal 1, PT)  contact guard assist  (Pended)   -MR     Distance (Gait Goal 1, PT)  100'  (Pended)   -MR     Time Frame (Gait Training Goal 1, PT)  2 weeks  (Pended)   -MR     Progress/Outcome (Gait Training Goal 1, PT)  continuing progress toward goal  (Pended)   -MR       User Key  (r) = Recorded By, (t) = Taken By, (c) = Cosigned By    Initials Name Provider Type    Hermelinda Ramos, PT Student PT Student        Clinical Impression     Row Name 11/11/19 1438          Pain Assessment    Additional Documentation  Pain Scale: Numbers Pre/Post-Treatment (Group)  (Pended)   -MR     Row Name 11/11/19 1438          Pain Scale: Numbers Pre/Post-Treatment    Pain Scale: Numbers, Pretreatment  4/10  (Pended)   -MR     Pain Scale: Numbers, Post-Treatment  4/10  (Pended)   -MR     Pain Location - Side  Right  (Pended)   -MR     Pain Location - Orientation  posterior  (Pended)   -MR     Pain Location  flank  (Pended)  His back was also causing him pain.   -MR     Pain Intervention(s)  Ambulation/increased activity  (Pended)    -MR Goodman Name 11/11/19 1438          Plan of Care Review    Plan of Care Reviewed With  patient;daughter  (Pended)   -MR Goodman Name 11/11/19 1438          Physical Therapy Clinical Impression    Patient/Family Goals Statement (PT Clinical Impression)  Pt's daughter wants him to be D/C to assisted living.   (Pended)   -MR     Criteria for Skilled Interventions Met (PT Clinical Impression)  yes  (Pended)   -MR     Rehab Potential (PT Clinical Summary)  good, to achieve stated therapy goals  (Pended)   -MR Goodman Name 11/11/19 1438          Vital Signs    Pre SpO2 (%)  96  (Pended)   -MR     O2 Delivery Pre Treatment  supplemental O2  (Pended)  5 LPM  -MR     Intra SpO2 (%)  94  (Pended)   -MR     O2 Delivery Intra Treatment  supplemental O2  (Pended)  6 LPM  -MR     Post SpO2 (%)  94  (Pended)   -MR     O2 Delivery Post Treatment  supplemental O2  (Pended)  5 LPM  -MR     Pre Patient Position  Supine  (Pended)   -MR     Intra Patient Position  Standing  (Pended)   -MR     Post Patient Position  Sitting  (Pended)   -MR Goodman Name 11/11/19 1438          Positioning and Restraints    Pre-Treatment Position  in bed  (Pended)   -MR     Post Treatment Position  chair  (Pended)   -MR     In Chair  sitting;call light within reach;encouraged to call for assist;exit alarm on  (Pended)   -MR       User Key  (r) = Recorded By, (t) = Taken By, (c) = Cosigned By    Initials Name Provider Type    Hermelinda Ramos, PT Student PT Student        Outcome Measures     Row Name 11/11/19 1438          How much help from another person do you currently need...    Turning from your back to your side while in flat bed without using bedrails?  2  (Pended)   -MR     Moving from lying on back to sitting on the side of a flat bed without bedrails?  2  (Pended)   -MR     Moving to and from a bed to a chair (including a wheelchair)?  2  (Pended)   -MR     Standing up from a chair using your arms (e.g., wheelchair, bedside chair)?  2   (Pended)   -MR     Climbing 3-5 steps with a railing?  2  (Pended)   -MR     To walk in hospital room?  2  (Pended)   -MR     AM-PAC 6 Clicks Score (PT)  12  (Pended)   -MR     Row Name 11/11/19 1438          Functional Assessment    Outcome Measure Options  AM-PAC 6 Clicks Basic Mobility (PT)  (Pended)   -MR       User Key  (r) = Recorded By, (t) = Taken By, (c) = Cosigned By    Initials Name Provider Type    Hermelinda Ramos, PT Student PT Student        Physical Therapy Education     Title: PT OT SLP Therapies (Not Started)     Topic: Physical Therapy (In Progress)     Point: Mobility training (Done)     Learning Progress Summary           Patient Acceptance, E,TB, VU by MR at 11/11/2019  3:29 PM    Comment:  Pt instructed on gait training and transfers with RW.   Family Acceptance, E,TB, VU by MR at 11/11/2019  3:29 PM    Comment:  Pt instructed on gait training and transfers with RW.                   Point: Body mechanics (Done)     Learning Progress Summary           Patient Acceptance, E,TB, VU by MR at 11/11/2019  3:29 PM    Comment:  Pt instructed on gait training and transfers with RW.   Family Acceptance, E,TB, VU by MR at 11/11/2019  3:29 PM    Comment:  Pt instructed on gait training and transfers with RW.                   Point: Precautions (Done)     Learning Progress Summary           Patient Acceptance, E,TB, VU by MR at 11/11/2019  3:29 PM    Comment:  Pt instructed on gait training and transfers with RW.   Family Acceptance, E,TB, VU by MR at 11/11/2019  3:29 PM    Comment:  Pt instructed on gait training and transfers with RW.                               User Key     Initials Effective Dates Name Provider Type Discipline    MR 11/06/19 -  Hermelinda Donovan, PT Student PT Student PT              PT Recommendation and Plan  Planned Therapy Interventions (PT Eval): (P) balance training, bed mobility training, gait training, transfer training, strengthening  Outcome Summary/Treatment Plan  (PT)  Anticipated Equipment Needs at Discharge (PT): (P) front wheeled walker  Anticipated Discharge Disposition (PT): (P) assisted living facility ([facility]), inpatient rehabilitation facility  Plan of Care Reviewed With: (P) patient, daughter  Outcome Summary: (P) PT eval completed. Pt shows deficits in BLE strength, coordination, and balance. He is expected to benefit from continued PT services prior to D/C.      Time Calculation:   PT Charges     Row Name 11/11/19 1438             Time Calculation    Start Time  1438  (Pended)   -MR      PT Received On  11/11/19  (Pended)   -MR      PT Goal Re-Cert Due Date  11/21/19  (Pended)   -MR        User Key  (r) = Recorded By, (t) = Taken By, (c) = Cosigned By    Initials Name Provider Type     Zion Hermelinda, PT Student PT Student        Therapy Charges for Today     Code Description Service Date Service Provider Modifiers Qty    [id number] HC PT EVAL MOD COMPLEXITY 3 11/11/2019 Hermelinda Donovan, PT Student GP 1          PT G-Codes  Outcome Measure Options: (P) AM-PAC 6 Clicks Basic Mobility (PT)  AM-PAC 6 Clicks Score (PT): (P) 12    Hermelinda Donovan PT Student  11/11/2019

## 2019-11-11 NOTE — PROGRESS NOTES
Continued Stay Note  CARLOS Shane     Patient Name: Jani Pena  MRN: [id number]  Today's Date: 11/11/2019    Admit Date: 11/10/2019    Discharge Plan     Row Name 11/11/19 1540       Plan    Plan  Consult for living will. Spoke to pt. and he has a living will but wants to change it. Provided pt. with pamphlet, discussed completion. He will review and SW will visit tomorrow afternoon to complete.        Discharge Codes    No documentation.             OSVALDO VelascoW

## 2019-11-11 NOTE — THERAPY EVALUATION
Acute Care - Occupational Therapy Initial Evaluation   [place]     Patient Name: Jani Pena  : 1950  MRN: [id number]  Today's Date: 2019  Onset of Illness/Injury or Date of Surgery: 11/10/19  Date of Referral to OT: 11/10/19  Referring Physician: Dr. Perez    Admit Date: 11/10/2019       ICD-10-CM ICD-9-CM   1. Acute on chronic respiratory failure with hypoxia and hypercapnia (CMS/HCC) J96.21 518.84    J96.22 786.09     799.02   2. Impaired mobility and ADLs Z74.09 799.89     Patient Active Problem List   Diagnosis   • Anxiety   • Atrial fibrillation (CMS/HCC)   • Chronic kidney disease, stage III (moderate) (CMS/HCC)   • Steroid-dependent COPD (CMS/HCC)   • Degeneration of cervical intervertebral disc   • GERD without esophagitis   • Diverticulosis   • Hemorrhoids   • Hiatal hernia   • Hyperlipidemia   • Essential hypertension   • Kyphosis, acquired   • PEDRO on CPAP   • Benign prostatic hyperplasia with incomplete bladder emptying   • History of arthritis   • Back pain   • Depression   • AQUINO (dyspnea on exertion)   • History of ventricular septal defect   • COPD with exacerbation (CMS/HCC)   • Impaired mobility and ADLs   • Chronic diastolic congestive heart failure (CMS/HCC)   • Coronary artery disease involving native coronary artery of native heart with angina pectoris (CMS/HCC)   • Lumbar radiculopathy   • Dysphagia   • Anemia   • Cardiac pacemaker in situ   • Sick sinus syndrome (CMS/HCC)   • Polypharmacy   • Chronic respiratory failure with hypoxia and hypercapnia (CMS/HCC)   • Hematoma   • Acute on chronic respiratory failure with hypoxia and hypercapnia (CMS/HCC)   • Repeated falls   • Bruise of both arms     Past Medical History:   Diagnosis Date   • Abnormal liver enzymes    • Anemia    • Anxiety    • Arthritis    • Atherosclerotic heart disease of native coronary artery without angina pectoris    • Atrial fibrillation (CMS/HCC)    • Back pain    • BPH (benign prostatic hyperplasia)     • Cataract, bilateral    • Chest pain     2-3 MONTHS AGO.  PER PT, HAS ADDRESSED WITH CARDIOLOGIST.  STATES HAS NTG PRN.   • CHF (congestive heart failure) (CMS/HCC)    • Chronic bronchitis (CMS/HCC)    • Chronic kidney disease    • COPD (chronic obstructive pulmonary disease) (CMS/HCC)    • Degeneration of cervical intervertebral disc    • Depression    • Diverticulosis    • Dyspnea    • Esophageal reflux    • Esophagitis    • Gastritis    • Hematuria    • Hemorrhoids    • Hiatal hernia    • History of arthritis    • History of nuclear stress test     UNSURE OF DATE   • History of peripheral neuropathy    • History of ventricular septal defect    • History of ventricular septal defect    • Hyperlipidemia    • Hypertension    • Impaired functional mobility, balance, gait, and endurance    • Infectious diarrhea    • Kyphosis, acquired    • Lumbar radiculopathy    • Mitral and aortic valve disease    • Nephrolithiasis    • Phimosis    • Problems with swallowing     WITH FOOD   • Respiratory failure (CMS/HCC)    • Sleep apnea     BIPAP   • TIA (transient ischemic attack)     X3.   2014   • Ventral hernia    • Wears glasses     FOR READING     Past Surgical History:   Procedure Laterality Date   • CARDIAC CATHETERIZATION     • CARDIAC CATHETERIZATION N/A 5/24/2018    Procedure: Left Heart Cath;  Surgeon: Edouard Robertson MD;  Location:  [facility];  Service: Cardiovascular   • CARDIAC ELECTROPHYSIOLOGY PROCEDURE N/A 1/31/2019    Procedure: PACEMAKER IMPLANTATION- DC;  Surgeon: Omar Encinas DO;  Location:  [facility];  Service: Cardiology   • ENDOSCOPY N/A 1/26/2019    Procedure: ESOPHAGOGASTRODUODENOSCOPY;  Surgeon: Brunner, Mark I, MD;  Location:  [facility];  Service: Gastroenterology   • HERNIA REPAIR     • ORTHOPEDIC SURGERY Left     Left hip arthroplasty   • PACEMAKER IMPLANTATION  01/31/2019   • SUPRAPUBIC CATHETER INSERTION      History of Percutaneous Catheter Placement  Into Ureter   • THROAT SURGERY      FOR SLEEP APNEA   • VSD REPAIR      History of VSD Repair By Patch Closure          OT ASSESSMENT FLOWSHEET (last 12 hours)      Occupational Therapy Evaluation     Row Name 11/11/19 1436                   OT Evaluation Time/Intention    Subjective Information  complains of;pain  -        Document Type  evaluation  -        Mode of Treatment  occupational therapy  -        Patient Effort  good  -        Symptoms Noted During/After Treatment  fatigue  -           General Information    Patient Profile Reviewed?  yes  -        Onset of Illness/Injury or Date of Surgery  11/10/19  -        Referring Physician  Dr. Perez  -        Patient Observations  alert;cooperative;agree to therapy  -        Patient/Family Observations  Pts daughter present   -        General Observations of Patient  Pt received supine in bed on 5L O2 via nc  -        Prior Level of Function  independent:;all household mobility;bathing;dressing  -        Equipment Currently Used at Home  rollator;shower chair;raised toilet;oxygen;cane, straight;bipap/ cpap  -        Pertinent History of Current Functional Problem  acute on chronic respiratory failure with hypoxia/hypercapnia, COPD with exacerbation, frequent falls, anxiety, afib, CKDIII, essential HTN, back pain, impaired mobility/ADL, anemia  -        Existing Precautions/Restrictions  oxygen therapy device and L/min;fall  -        Risks Reviewed  patient and family:;increased discomfort  -        Benefits Reviewed  patient and family:;improve function;increase independence;increase strength  -        Barriers to Rehab  previous functional deficit  -           Relationship/Environment    Primary Source of Support/Comfort  child(miguel)  -        Lives With  alone  -           Resource/Environmental Concerns    Current Living Arrangements  home/apartment/condo  -           Home Main Entrance    Number of Stairs, Main Entrance   one  -           Stairs Within Home, Primary    Stairs, Within Home, Primary  to basement  -        Number of Stairs, Within Home, Primary  other (see comments) 13  -        Stairs Comment, Within Home, Primary  Pt stays on main level  -           Cognitive Assessment/Intervention- PT/OT    Orientation Status (Cognition)  oriented x 4  -        Follows Commands (Cognition)  WFL  -        Safety Deficit (Cognitive)  awareness of need for assistance;insight into deficits/self awareness;safety precautions awareness;safety precautions follow-through/compliance  -           Safety Issues, Functional Mobility    Safety Issues Affecting Function (Mobility)  awareness of need for assistance;insight into deficits/self awareness;safety precaution awareness;safety precautions follow-through/compliance  -        Impairments Affecting Function (Mobility)  balance;endurance/activity tolerance;strength;shortness of breath;pain  -           Bed Mobility Assessment/Treatment    Bed Mobility Assessment/Treatment  supine-sit  -        Supine-Sit Tiff (Bed Mobility)  contact guard  -        Assistive Device (Bed Mobility)  head of bed elevated  -           Functional Mobility    Functional Mobility- Ind. Level  minimum assist (75% patient effort)  -        Functional Mobility- Device  rolling walker  -        Functional Mobility-Distance (Feet)  40  -        Functional Mobility- Safety Issues  supplemental O2;balance decreased during turns  -        Functional Mobility- Comment  Pt walked 40' x 2 with standing rest break  -           Transfer Assessment/Treatment    Transfer Assessment/Treatment  sit-stand transfer;stand-sit transfer  -           Sit-Stand Transfer    Sit-Stand Tiff (Transfers)  minimum assist (75% patient effort)  -        Assistive Device (Sit-Stand Transfers)  walker, front-wheeled  -           Stand-Sit Transfer    Stand-Sit Tiff (Transfers)  minimum  assist (75% patient effort)  -        Assistive Device (Stand-Sit Transfers)  walker, front-wheeled  -           ADL Assessment/Intervention    BADL Assessment/Intervention  bathing;upper body dressing;lower body dressing;grooming;feeding;toileting  -           Bathing Assessment/Intervention    Bathing [place] Level  minimum assist (75% patient effort)  -           Upper Body Dressing Assessment/Training    Upper Body Dressing [place] Level  set up  -           Lower Body Dressing Assessment/Training    Lower Body Dressing [place] Level  minimum assist (75% patient effort)  -           Grooming Assessment/Training    [place] Level (Grooming)  set up  -           Self-Feeding Assessment/Training    [place] Level (Feeding)  set up  -           Toileting Assessment/Training    [place] Level (Toileting)  supervision  -           BADL Safety/Performance    Impairments, BADL Safety/Performance  balance;endurance/activity tolerance;pain;strength  -           General ROM    GENERAL ROM COMMENTS  Pt with limited ROM in B shoulders, pt with boutinere's deformity in several digits with splints in place  -           MMT (Manual Muscle Testing)    General MMT Comments  MMT not tested d/t rib pain  -           Positioning and Restraints    Pre-Treatment Position  in bed  -        Post Treatment Position  chair  -        In Chair  sitting;call light within reach;encouraged to call for assist  -           Pain Assessment    Additional Documentation  Pain Scale: Numbers Pre/Post-Treatment (Group);Pain Scale 2: Numbers Pre/Post-Treatment (Group)  -           Pain Scale: Numbers Pre/Post-Treatment    Pain Scale: Numbers, Pretreatment  4/10  -        Pain Scale: Numbers, Post-Treatment  4/10  -        Pain Location  back  -        Pain Intervention(s)  Repositioned;Ambulation/increased activity  -           Pain Scale 2: Numbers Pre/Post-Treatment    Pain Scale 2:  Numbers, Pretreatment  4/10  -        Pain Scale 2: Numbers, Post-Treatment  4/10  -        Pain Location 2 - Side  Right  -        Pain Location 2  flank  -        Pain Intervention(s) 2  Repositioned;Ambulation/increased activity  -           Wound 11/10/19 1413 Left anterior;upper arm Skin Tear    Wound - Properties Group Date first assessed: 11/10/19  -CB Time first assessed: 1413  -CB Side: Left  -CB Orientation: anterior;upper  -CB Location: arm  -CB Primary Wound Type: Skin tear  -CB       Wound 11/10/19 1414 Right parietal region Abrasion    Wound - Properties Group Date first assessed: 11/10/19  -CB Time first assessed: 1414  -CB Side: Right  -CB Location: parietal region  -CB Primary Wound Type: Abrasion  -CB       Wound 11/11/19 0600 Right anterior;distal;upper arm Skin Tear    Wound - Properties Group Date first assessed: 11/11/19  -RE Time first assessed: 0600  -RE Side: Right  -RE Orientation: anterior;distal;upper  -RE Location: arm  -RE Primary Wound Type: Skin tear  -RE       Coping    Observed Emotional State  accepting;cooperative  -        Verbalized Emotional State  acceptance  -           Plan of Care Review    Plan of Care Reviewed With  patient;daughter  -           Clinical Impression (OT)    Date of Referral to OT  11/10/19  -        OT Diagnosis  ADL decline  -        Patient/Family Goals Statement (OT Eval)  Pt wants to d/c to rehab  -        Criteria for Skilled Therapeutic Interventions Met (OT Eval)  yes;treatment indicated  -        Rehab Potential (OT Eval)  good, to achieve stated therapy goals  -        Therapy Frequency (OT Eval)  3 times/wk  -        Care Plan Review (OT)  evaluation/treatment results reviewed;patient/other agree to care plan  -        Care Plan Review, Other Participant (OT Eval)  daughter  -        Anticipated Discharge Disposition (OT)  inpatient rehabilitation facility  -           Vital Signs    Pre SpO2 (%)  96  -         O2 Delivery Pre Treatment  supplemental O2 5L  -AH        Intra SpO2 (%)  94  -AH        O2 Delivery Intra Treatment  supplemental O2 6L  -AH        Post SpO2 (%)  94  -AH        O2 Delivery Post Treatment  supplemental O2 5L  -AH           OT Goals    Transfer Goal Selection (OT)  transfer, OT goal 1  -AH        Dressing Goal Selection (OT)  dressing, OT goal 1  -AH        Toileting Goal Selection (OT)  toileting, OT goal 1  -AH        Strength Goal Selection (OT)  strength, OT goal 1  -AH        Functional Mobility Goal Selection (OT)  functional mobility, OT goal 1  -AH        Additional Documentation  Strength Goal Selection (OT) (Row);Functional Mobility Selection (OT) (Row)  -AH           Transfer Goal 1 (OT)    Activity/Assistive Device (Transfer Goal 1, OT)  sit-to-stand/stand-to-sit;walker, rolling  -        [place] Level/Cues Needed (Transfer Goal 1, OT)  contact guard assist  -        Time Frame (Transfer Goal 1, OT)  by discharge  -        Progress/Outcome (Transfer Goal 1, OT)  goal ongoing  -AH           Dressing Goal 1 (OT)    Activity/Assistive Device (Dressing Goal 1, OT)  lower body dressing  -        [place]/Cues Needed (Dressing Goal 1, OT)  set-up required  -        Time Frame (Dressing Goal 1, OT)  by discharge  -        Progress/Outcome (Dressing Goal 1, OT)  goal ongoing  -           Toileting Goal 1 (OT)    Activity/Device (Toileting Goal 1, OT)  adjust/manage clothing;perform perineal hygiene  -        [place] Level/Cues Needed (Toileting Goal 1, OT)  conditional independence  -        Time Frame (Toileting Goal 1, OT)  by discharge  -        Progress/Outcome (Toileting Goal 1, OT)  goal ongoing  -AH           Strength Goal 1 (OT)    Strength Goal 1 (OT)  Pt will perform UB strengthening ex using theraband for resistance.  -        Time Frame (Strength Goal 1, OT)  by discharge  -        Progress/Outcome (Strength Goal 1, OT)  goal ongoing  -AH            Functional Mobility Goal 1 (OT)    Activity/Assistive Device (Functional Mobility Goal 1, OT)  walker, rolling  -        [place] Level/Cues Needed (Functional Mobility Goal 1, OT)  contact guard assist  -        Distance Goal 1 (Functional Mobility, OT)  100  -        Time Frame (Functional Mobility Goal 1, OT)  by discharge  -        Progress/Outcome (Functional Mobility Goal 1, OT)  goal ongoing  -           Living Environment    Home Accessibility  stairs to enter home;stairs within home  -          User Key  (r) = Recorded By, (t) = Taken By, (c) = Cosigned By    Initials Name Effective Dates    Akilah Del Angel 03/07/18 -     Mone López RN 10/26/16 -     Agustina Wylie RN 10/26/16 -          Occupational Therapy Education     Title: PT OT SLP Therapies (Not Started)     Topic: Occupational Therapy (In Progress)     Point: ADL training (Done)     Description: Instruct learner(s) on proper safety adaptation and remediation techniques during self care or transfers.   Instruct in proper use of assistive devices.    Learning Progress Summary           Patient Acceptance, E,TB, VU by  at 11/11/2019  3:48 PM    Comment:  Role of OT/POC   Family Acceptance, E,TB, VU by  at 11/11/2019  3:48 PM    Comment:  Role of OT/POC                               User Key     Initials Effective Dates Name Provider Type Discipline     03/07/18 -  Akilah Gutierrez Occupational Therapist OT                  OT Recommendation and Plan  Outcome Summary/Treatment Plan (OT)  Anticipated Discharge Disposition (OT): inpatient rehabilitation facility  Therapy Frequency (OT Eval): 3 times/wk  Plan of Care Review  Plan of Care Reviewed With: patient, daughter  Plan of Care Reviewed With: patient, daughter  Outcome Summary: Pt seen for OT evaluation today.  Pt presents with weakness and decreased independence with self care and functional mobility tasks.  Pt is expected to benefit from skilled OT to  improve his strength and independence with ADL tasks    Outcome Measures     Row Name 11/11/19 1436             How much help from another is currently needed...    Putting on and taking off regular lower body clothing?  3  -      Bathing (including washing, rinsing, and drying)  3  -AH      Toileting (which includes using toilet bed pan or urinal)  3  -AH      Putting on and taking off regular upper body clothing  3  -      Taking care of personal grooming (such as brushing teeth)  4  -      Eating meals  4  -      AM-PAC 6 Clicks Score (OT)  20  -         Functional Assessment    Outcome Measure Options  AM-PAC 6 Clicks Daily Activity (OT)  -        User Key  (r) = Recorded By, (t) = Taken By, (c) = Cosigned By    Initials Name Provider Type    Akilah Del Angel Occupational Therapist          Time Calculation:   Time Calculation- OT     Row Name 11/11/19 1550             Time Calculation- OT    OT Start Time  1436  -      OT Received On  11/11/19  -      OT Goal Re-Cert Due Date  11/21/19  -        User Key  (r) = Recorded By, (t) = Taken By, (c) = Cosigned By    Initials Name Provider Type    Akilah Del Angel Occupational Therapist        Therapy Charges for Today     Code Description Service Date Service Provider Modifiers Qty    18014314540 HC OT EVAL LOW COMPLEXITY 3 11/11/2019 Akilah Gutierrez GO 1               Akilah Gutierrez  11/11/2019

## 2019-11-11 NOTE — PLAN OF CARE
Problem: Patient Care Overview  Goal: Plan of Care Review  Outcome: Ongoing (interventions implemented as appropriate)   11/11/19 9272   Coping/Psychosocial   Plan of Care Reviewed With patient;daughter   OTHER   Outcome Summary PT eval completed. Pt shows deficits in BLE strength, coordination, and balance. He is expected to benefit from continued PT services prior to D/C.

## 2019-11-12 LAB
A-A DO2: 11.4 MMHG
ALBUMIN SERPL-MCNC: 3.7 G/DL (ref 3.5–5.2)
ANION GAP SERPL CALCULATED.3IONS-SCNC: 13 MMOL/L (ref 5–15)
ARTERIAL PATENCY WRIST A: POSITIVE
ATMOSPHERIC PRESS: 740 MMHG
BASE EXCESS BLDA CALC-SCNC: 6.9 MMOL/L (ref 0–2)
BDY SITE: ABNORMAL
BUN BLD-MCNC: 60 MG/DL (ref 8–23)
BUN/CREAT SERPL: 29.3 (ref 7–25)
CALCIUM SPEC-SCNC: 9.3 MG/DL (ref 8.6–10.5)
CHLORIDE SERPL-SCNC: 101 MMOL/L (ref 98–107)
CO2 SERPL-SCNC: 28 MMOL/L (ref 22–29)
COHGB MFR BLD: 0.9 % (ref 0–2)
CREAT BLD-MCNC: 2.05 MG/DL (ref 0.76–1.27)
DEPRECATED RDW RBC AUTO: 48 FL (ref 37–54)
ERYTHROCYTE [DISTWIDTH] IN BLOOD BY AUTOMATED COUNT: 13.2 % (ref 12.3–15.4)
GAS FLOW AIRWAY: 5 LPM
GFR SERPL CREATININE-BSD FRML MDRD: 32 ML/MIN/1.73
GLUCOSE BLD-MCNC: 123 MG/DL (ref 65–99)
HCO3 BLDA-SCNC: 32.9 MMOL/L (ref 22–28)
HCT VFR BLD AUTO: 31.4 % (ref 37.5–51)
HCT VFR BLD CALC: 27.9 %
HGB BLD-MCNC: 9.6 G/DL (ref 13–17.7)
HGB BLDA-MCNC: 9.1 G/DL (ref 12–18)
MCH RBC QN AUTO: 30.3 PG (ref 26.6–33)
MCHC RBC AUTO-ENTMCNC: 30.6 G/DL (ref 31.5–35.7)
MCV RBC AUTO: 99.1 FL (ref 79–97)
METHGB BLD QL: 0.5 % (ref 0–1.5)
MODALITY: ABNORMAL
NOTE: ABNORMAL
OXYHGB MFR BLDV: 94.3 % (ref 94–99)
PCO2 BLDA: 54.5 MM HG (ref 35–45)
PCO2 TEMP ADJ BLD: ABNORMAL MM[HG]
PH BLDA: 7.39 PH UNITS (ref 7.3–7.5)
PH, TEMP CORRECTED: ABNORMAL
PHOSPHATE SERPL-MCNC: 3.8 MG/DL (ref 2.5–4.5)
PLATELET # BLD AUTO: 198 10*3/MM3 (ref 140–450)
PMV BLD AUTO: 9.3 FL (ref 6–12)
PO2 BLDA: 72.6 MM HG (ref 75–100)
PO2 TEMP ADJ BLD: ABNORMAL MM[HG]
POTASSIUM BLD-SCNC: 4.8 MMOL/L (ref 3.5–5.2)
RBC # BLD AUTO: 3.17 10*6/MM3 (ref 4.14–5.8)
SAO2 % BLDCOA: 95.6 % (ref 94–100)
SODIUM BLD-SCNC: 142 MMOL/L (ref 136–145)
VENTILATOR MODE: ABNORMAL
WBC NRBC COR # BLD: 10.64 10*3/MM3 (ref 3.4–10.8)

## 2019-11-12 PROCEDURE — 97530 THERAPEUTIC ACTIVITIES: CPT

## 2019-11-12 PROCEDURE — 99232 SBSQ HOSP IP/OBS MODERATE 35: CPT | Performed by: INTERNAL MEDICINE

## 2019-11-12 PROCEDURE — 83050 HGB METHEMOGLOBIN QUAN: CPT

## 2019-11-12 PROCEDURE — 97116 GAIT TRAINING THERAPY: CPT

## 2019-11-12 PROCEDURE — 94799 UNLISTED PULMONARY SVC/PX: CPT

## 2019-11-12 PROCEDURE — 63710000001 PREDNISONE PER 5 MG: Performed by: INTERNAL MEDICINE

## 2019-11-12 PROCEDURE — 82375 ASSAY CARBOXYHB QUANT: CPT

## 2019-11-12 PROCEDURE — 25010000002 METHYLPREDNISOLONE PER 125 MG: Performed by: INTERNAL MEDICINE

## 2019-11-12 PROCEDURE — 25010000002 CEFTRIAXONE SODIUM-DEXTROSE 1-3.74 GM-%(50ML) RECONSTITUTED SOLUTION: Performed by: INTERNAL MEDICINE

## 2019-11-12 PROCEDURE — 85027 COMPLETE CBC AUTOMATED: CPT | Performed by: INTERNAL MEDICINE

## 2019-11-12 PROCEDURE — 97110 THERAPEUTIC EXERCISES: CPT

## 2019-11-12 PROCEDURE — 82805 BLOOD GASES W/O2 SATURATION: CPT

## 2019-11-12 PROCEDURE — 36600 WITHDRAWAL OF ARTERIAL BLOOD: CPT

## 2019-11-12 PROCEDURE — 80069 RENAL FUNCTION PANEL: CPT | Performed by: INTERNAL MEDICINE

## 2019-11-12 RX ORDER — CYCLOBENZAPRINE HCL 10 MG
5 TABLET ORAL EVERY 8 HOURS SCHEDULED
Status: DISCONTINUED | OUTPATIENT
Start: 2019-11-12 | End: 2019-11-14 | Stop reason: HOSPADM

## 2019-11-12 RX ORDER — HYDROCODONE BITARTRATE AND ACETAMINOPHEN 7.5; 325 MG/1; MG/1
1 TABLET ORAL EVERY 6 HOURS PRN
Status: DISCONTINUED | OUTPATIENT
Start: 2019-11-12 | End: 2019-11-14 | Stop reason: HOSPADM

## 2019-11-12 RX ORDER — CARBOXYMETHYLCELLULOSE SODIUM 5 MG/ML
1 SOLUTION/ DROPS OPHTHALMIC 3 TIMES DAILY PRN
Status: DISCONTINUED | OUTPATIENT
Start: 2019-11-12 | End: 2019-11-14 | Stop reason: HOSPADM

## 2019-11-12 RX ORDER — BENZONATATE 100 MG/1
100 CAPSULE ORAL 3 TIMES DAILY PRN
Status: DISCONTINUED | OUTPATIENT
Start: 2019-11-12 | End: 2019-11-14 | Stop reason: HOSPADM

## 2019-11-12 RX ADMIN — BUDESONIDE AND FORMOTEROL FUMARATE DIHYDRATE 2 PUFF: 80; 4.5 AEROSOL RESPIRATORY (INHALATION) at 06:41

## 2019-11-12 RX ADMIN — TAMSULOSIN HYDROCHLORIDE 0.4 MG: 0.4 CAPSULE ORAL at 20:48

## 2019-11-12 RX ADMIN — METOPROLOL TARTRATE 25 MG: 25 TABLET ORAL at 09:43

## 2019-11-12 RX ADMIN — IPRATROPIUM BROMIDE AND ALBUTEROL SULFATE 3 ML: .5; 3 SOLUTION RESPIRATORY (INHALATION) at 12:51

## 2019-11-12 RX ADMIN — ESCITALOPRAM OXALATE 10 MG: 10 TABLET ORAL at 09:43

## 2019-11-12 RX ADMIN — BUDESONIDE AND FORMOTEROL FUMARATE DIHYDRATE 2 PUFF: 80; 4.5 AEROSOL RESPIRATORY (INHALATION) at 20:02

## 2019-11-12 RX ADMIN — METHYLPREDNISOLONE SODIUM SUCCINATE 60 MG: 125 INJECTION, POWDER, FOR SOLUTION INTRAMUSCULAR; INTRAVENOUS at 20:48

## 2019-11-12 RX ADMIN — AMLODIPINE BESYLATE 5 MG: 5 TABLET ORAL at 09:43

## 2019-11-12 RX ADMIN — METHYLPREDNISOLONE SODIUM SUCCINATE 60 MG: 125 INJECTION, POWDER, FOR SOLUTION INTRAMUSCULAR; INTRAVENOUS at 09:44

## 2019-11-12 RX ADMIN — FUROSEMIDE 20 MG: 20 TABLET ORAL at 09:43

## 2019-11-12 RX ADMIN — PREDNISONE 10 MG: 10 TABLET ORAL at 09:43

## 2019-11-12 RX ADMIN — HYDROCODONE BITARTRATE AND ACETAMINOPHEN 1 TABLET: 5; 325 TABLET ORAL at 02:29

## 2019-11-12 RX ADMIN — LISINOPRIL 5 MG: 5 TABLET ORAL at 09:45

## 2019-11-12 RX ADMIN — IPRATROPIUM BROMIDE AND ALBUTEROL SULFATE 3 ML: .5; 3 SOLUTION RESPIRATORY (INHALATION) at 06:41

## 2019-11-12 RX ADMIN — CARBOXYMETHYLCELLULOSE SODIUM 1 DROP: 5 SOLUTION/ DROPS OPHTHALMIC at 15:40

## 2019-11-12 RX ADMIN — CYCLOBENZAPRINE HYDROCHLORIDE 5 MG: 10 TABLET, FILM COATED ORAL at 14:28

## 2019-11-12 RX ADMIN — HYDROCODONE BITARTRATE AND ACETAMINOPHEN 1 TABLET: 5; 325 TABLET ORAL at 10:05

## 2019-11-12 RX ADMIN — IPRATROPIUM BROMIDE AND ALBUTEROL SULFATE 3 ML: .5; 3 SOLUTION RESPIRATORY (INHALATION) at 20:02

## 2019-11-12 RX ADMIN — CEFTRIAXONE 1 G: 1 INJECTION, SOLUTION INTRAVENOUS at 11:48

## 2019-11-12 RX ADMIN — LIDOCAINE 1 PATCH: 50 PATCH CUTANEOUS at 09:45

## 2019-11-12 RX ADMIN — ATORVASTATIN CALCIUM 10 MG: 10 TABLET, FILM COATED ORAL at 20:48

## 2019-11-12 RX ADMIN — ASPIRIN 81 MG: 81 TABLET, COATED ORAL at 10:05

## 2019-11-12 RX ADMIN — PANTOPRAZOLE SODIUM 40 MG: 40 TABLET, DELAYED RELEASE ORAL at 06:21

## 2019-11-12 RX ADMIN — METOPROLOL TARTRATE 25 MG: 25 TABLET ORAL at 20:48

## 2019-11-12 RX ADMIN — FINASTERIDE 5 MG: 5 TABLET, FILM COATED ORAL at 09:43

## 2019-11-12 NOTE — PROGRESS NOTES
Adult Nutrition  Assessment/PES    Patient Name:  Jani Pena  YOB: 1950  MRN: [id number]  Admit Date:  11/10/2019    Assessment Date:  11/12/2019    Comments:  Rec. #1: Continue with diet as ordered. Pt. Consuming ~70% of meals on average per NSG. Consult RD PRN.     Reason for Assessment     Row Name 11/12/19 1210          Reason for Assessment    Reason For Assessment  diagnosis/disease state     Diagnosis  pulmonary disease;renal disease;cardiac disease CKD, HTN, COPD           Anthropometrics     Row Name 11/12/19 0500          Anthropometrics    Weight  80.6 kg (177 lb 11.1 oz)         Labs/Tests/Procedures/Meds     Row Name 11/12/19 1210          Labs/Procedures/Meds    Lab Results Reviewed  reviewed, pertinent     Lab Results Comments  High: BUN, Gluc, Cr        Medications    Pertinent Medications Reviewed  reviewed, pertinent           Estimated/Assessed Needs     Row Name 11/12/19 1211          Calculation Measurements    Weight Used For Calculations  70.9 kg (156 lb 4.6 oz)        Estimated/Assessed Needs    Additional Documentation  Monument-St. Jeor Equation (Group);Calorie Requirements (Group);Fluid Requirements (Group);Protein Requirements (Group)        Calorie Requirements    Estimated Calorie Requirement Comment  [id number]        Monument-St. Jeor Equation    RMR (Monument-St. Jeor Equation)  1448.4        Protein Requirements    Weight Used For Protein Calculations  70.9 kg (156 lb 4.6 oz)     Est Protein Requirement Amount (gms/kg)  0.75 gm protein 43-53 gm/day     Estimated Protein Requirements (gms/day)  53.17        Fluid Requirements    Estimated Fluid Requirement Method  -- output + 1000 ml     Mickey-Segar Method (over 20 kg)  2917.8         Nutrition Prescription Ordered     Row Name 11/12/19 1212          Nutrition Prescription PO    Current PO Diet  Dysphagia     Dysphagia Level  4  Mechanical soft no mixed consistencies     Fluid Consistency  Thin     Common  Modifiers  Cardiac;Consistent Carbohydrate         Evaluation of Received Nutrient/Fluid Intake     Row Name 11/12/19 1212          PO Evaluation    Number of Days PO Intake Evaluated  2 days     Number of Meals  4     % PO Intake  69               Problem/Interventions:  Problem 1     Row Name 11/12/19 1212          Nutrition Diagnoses Problem 1    Problem 1  Increased Nutrient Needs     Macronutrient  Kcal;Protein     Etiology (related to)  Medical Diagnosis     Pulmonary/Critical Care  COPD     Signs/Symptoms (evidenced by)  Other (comment) pulmonary dysfunction         Problem 2     Row Name 11/12/19 1212          Nutrition Diagnoses Problem 2    Problem 2  Impaired Nutrient Utilization     Etiology (related to)  Medical Diagnosis     Renal  CKD     Signs/Symptoms (evidenced by)  Biochemical     Specific Labs Noted  BUN;Creatinine             Intervention Goal     Row Name 11/12/19 1213          Intervention Goal    General  Meet nutritional needs for age/condition     PO  PO intake (%)     PO Intake %  -- 50-75         Nutrition Intervention     Row Name 11/12/19 1213          Nutrition Intervention    RD/Tech Action  Follow Tx progress         Nutrition Prescription     Row Name 11/12/19 1213          Nutrition Prescription PO    PO Prescription  -- continue with diet as ordered         Education/Evaluation     Row Name 11/12/19 1213          Education    Education  Will Instruct as appropriate        Monitor/Evaluation    Monitor  Per protocol;PO intake;Pertinent labs;Weight           Electronically signed by:  Stefany Dhillon RD  11/12/19 12:14 PM

## 2019-11-12 NOTE — PROGRESS NOTES
Hospitalist Progress Note.    LOS: 2 days    Patient Care Team:  Miguel Rojas MD as PCP - General  Miguel Rojas MD as PCP - Family Medicine  Ulysses Bass MD as Cardiologist (Cardiology)    Chief Complaint:    Follow-up on hypercarbic respiratory failure,     SUBJECTIVE:  He feels some better today with the exception of continued pain in his right lower back.  He also has a cough that is productive but this is a chronic issue for him.  He has not had any fever or chills.  Feels that his legs are slightly swollen. Wants something stronger for the pain in his back.    Hospital course:  Patient is a 69-year-old male with medical history of COPD on home oxygen, sleep apnea on CPAP, A. fib not on anticoagulation due to multiple falls, chronic kidney disease stage III, hypertension, chronic back pain was brought into the ER after sustaining a fall at home.  He also reported shortness of breath, generalized weakness and back pain.  He has only apparently been home for a week after returning from short-term rehab in [place].  So far he has already fallen twice at home resulting in multiple bruises in his bilateral upper extremities.  He reported falling asleep the night before presentation and ending up on the floor where he remained until he was found by 1 of the home health nurses.  In the ER, he had normal vitals and labs showed elevated CO2 level at 63 with a pH of 7.32.  He reported not being able to wear his BiPAP due to being on the floor for the whole night.  His urinalysis was suggestive of possible UTI so the patient was placed on BiPAP, given steroids and antibiotics and admitted to the hospitalist service.    His blood gases showed significant improvement with nightly use of BiPAP.  Patient does have a cough which is a chronic issue but continues to complain of pain in his right lower back.  His CT scan did not reveal any hydronephrosis/nephrolithiasis.  CT chest did not reveal any pneumonia/fluid.   "X-ray of his ribs did not reveal any fracture.  He did fall on the right when he sustained his fall at home and may have some muscle pain related to this.    Nursing noted that the patient had some difficulty with swallowing cardiology was even the dysphasia diet that is ordered for him currently.  I did review his EGD report that was done in [place] on 10/30/2019.  The gastroenterologist suspected he may have eosinophilic esophagitis and his distal esophagus was dilated empirically even though there was no stricture found.  I called for the results of the biopsy yesterday but they were not yet available.    Review of Systems:    The pertinent  ROS was done and it is noted above, rest  was negative.    OBJECTIVE:    Vital Signs  /59 (BP Location: Right arm, Patient Position: Lying)   Pulse 80   Temp 98.2 °F (36.8 °C) (Oral)   Resp 18   Ht 172.7 cm (68\")   Wt 80.6 kg (177 lb 11.1 oz)   SpO2 97%   BMI 27.02 kg/m²       I/O this shift:  In: 480 [P.O.:480]  Out: 300 [Urine:200; Emesis/NG output:100]    Intake/Output Summary (Last 24 hours) at 11/12/2019 1312  Last data filed at 11/12/2019 1155  Gross per 24 hour   Intake 960 ml   Output 1450 ml   Net -490 ml       Physical Exam:    General Appearance: alert, oriented x 3, no acute distress, very pleasant  HEENT: pupils round and reactive to light, oral mucosa dry, extraocular movements intact.  Neck: supple, no JVD, trachea midline  Lungs: Clear to Auscultation, unlabored breathing effort  Heart: RRR, normal S1 and S2, no S3, no rub  Abdomen: soft, non-tender, no palpable bladder, present bowel sounds to auscultation  Extremities: trace edema, cyanosis or clubbing.   Neuro: normal speech and mental status, grossly non focal.     Results Review:    Results from last 7 days   Lab Units 11/12/19  0642 11/11/19  0525 11/10/19  0935  11/06/19  1241   SODIUM mmol/L 142 145 149*   < > 148*   POTASSIUM mmol/L 4.8 4.5 4.3   < > 4.7   CHLORIDE mmol/L 101 103 105   " < > 103   TOTAL CO2 mmol/L  --   --   --   --  30.8*   CO2 mmol/L 28.0 29.3* 31.9*   < >  --    BUN mg/dL 60* 57* 60*   < > 45*   CREATININE mg/dL 2.05* 2.17* 2.77*   < > 2.28*   CALCIUM mg/dL 9.3 9.2 9.9   < > 9.9   BILIRUBIN mg/dL  --  0.4 0.5  --  0.5   ALK PHOS U/L  --  55 74  --  64   ALT (SGPT) U/L  --  13 15  --  11   AST (SGOT) U/L  --  16 21  --  15   GLUCOSE mg/dL 123* 146* 113*   < >  --     < > = values in this interval not displayed.       Estimated Creatinine Clearance: 38.8 mL/min (A) (by C-G formula based on SCr of 2.05 mg/dL (H)).    Results from last 7 days   Lab Units 11/12/19  0642 11/10/19  0935   MAGNESIUM mg/dL  --  2.1   PHOSPHORUS mg/dL 3.8  --              Results from last 7 days   Lab Units 11/12/19  0642 11/11/19  0525 11/10/19  0935 11/08/19  1304 11/06/19  1241   WBC 10*3/mm3 10.64 7.45 8.01 8.86 9.01   HEMOGLOBIN g/dL 9.6* 9.6* 10.3* 10.2* 11.4*   PLATELETS 10*3/mm3 198 197 215 232 239               Imaging Results (Last 24 Hours)     ** No results found for the last 24 hours. **          amLODIPine 5 mg Oral Q24H   aspirin 81 mg Oral Daily   atorvastatin 10 mg Oral Nightly   budesonide-formoterol 2 puff Inhalation BID - RT   cefTRIAXone 1 g Intravenous Q24H   cyclobenzaprine 5 mg Oral Q8H   escitalopram 10 mg Oral Daily   finasteride 5 mg Oral Daily   furosemide 20 mg Oral Daily   ipratropium-albuterol 3 mL Nebulization Q6H - RT   lidocaine 1 patch Transdermal Q24H   lisinopril 5 mg Oral Daily   methylPREDNISolone sodium succinate 60 mg Intravenous Q12H   metoprolol tartrate 25 mg Oral BID   pantoprazole 40 mg Oral Q AM   predniSONE 10 mg Oral Daily With Breakfast   tamsulosin 0.4 mg Oral Nightly   traZODone 50 mg Oral Nightly          Medication Review:   Current Facility-Administered Medications   Medication Dose Route Frequency Provider Last Rate Last Dose   • ALPRAZolam (XANAX) tablet 0.5 mg  0.5 mg Oral Nightly PRN Jermaine Perez MD   0.5 mg at 11/10/19 0871   • amLODIPine  (NORVASC) tablet 5 mg  5 mg Oral Q24H Jermaine Perez MD   5 mg at 11/12/19 0943   • aspirin EC tablet 81 mg  81 mg Oral Daily Jermaine Perez MD   81 mg at 11/12/19 1005   • atorvastatin (LIPITOR) tablet 10 mg  10 mg Oral Nightly Jermaine Perez MD   10 mg at 11/11/19 2015   • benzonatate (TESSALON) capsule 100 mg  100 mg Oral TID PRN Ottoniel Hill MD       • budesonide-formoterol (SYMBICORT) 80-4.5 MCG/ACT inhaler 2 puff  2 puff Inhalation BID - RT Jermaine Perez MD   2 puff at 11/12/19 0641   • carboxymethylcellulose (REFRESH PLUS) 0.5 % ophthalmic solution 1 drop  1 drop Both Eyes TID PRN Ottoniel Hill MD       • cefTRIAXone (ROCEPHIN) IVPB 1 g/50ml dextrose (premix)  1 g Intravenous Q24H Jermaine Perez  mL/hr at 11/12/19 1148 1 g at 11/12/19 1148   • cyclobenzaprine (FLEXERIL) tablet 5 mg  5 mg Oral Q8H Ottoniel Hill MD       • escitalopram (LEXAPRO) tablet 10 mg  10 mg Oral Daily Jermaine Perez MD   10 mg at 11/12/19 0943   • finasteride (PROSCAR) tablet 5 mg  5 mg Oral Daily Jermaine Perez MD   5 mg at 11/12/19 0943   • furosemide (LASIX) tablet 20 mg  20 mg Oral Daily Jermaine Perez MD   20 mg at 11/12/19 0943   • HYDROcodone-acetaminophen (NORCO) 7.5-325 MG per tablet 1 tablet  1 tablet Oral Q6H PRN Ottoniel Hill MD       • ipratropium-albuterol (DUO-NEB) nebulizer solution 3 mL  3 mL Nebulization Q6H - RT Jermaine Perez MD   3 mL at 11/12/19 1251   • lidocaine (LIDODERM) 5 % 1 patch  1 patch Transdermal Q24H Ottoniel Hill MD   1 patch at 11/12/19 0945   • lisinopril (PRINIVIL,ZESTRIL) tablet 5 mg  5 mg Oral Daily Jermaine Perez MD   5 mg at 11/12/19 0945   • methylPREDNISolone sodium succinate (SOLU-Medrol) injection 60 mg  60 mg Intravenous Q12H Jermaine Perez MD   60 mg at 11/12/19 0944   • metoprolol tartrate (LOPRESSOR) tablet 25 mg  25 mg Oral BID Jermaine Perez MD   25 mg at 11/12/19 0943   • pantoprazole (PROTONIX) EC tablet 40 mg  40 mg Oral Q AM Chris  Jermaine AGUSTIN MD   40 mg at 11/12/19 0621   • predniSONE (DELTASONE) tablet 10 mg  10 mg Oral Daily With Breakfast Jermaine Perez MD   10 mg at 11/12/19 0943   • QUEtiapine (SEROquel) tablet 12.5 mg  12.5 mg Oral TID PRN Jermaine Perez MD       • sodium chloride 0.9 % flush 10 mL  10 mL Intravenous PRN Emergency, Triage Protocol, MD   10 mL at 11/11/19 2016   • tamsulosin (FLOMAX) 24 hr capsule 0.4 mg  0.4 mg Oral Nightly Jermaine Perez MD   0.4 mg at 11/11/19 2015   • traMADol (ULTRAM) tablet 50 mg  50 mg Oral Q4H PRN Kaylee Michael DO   50 mg at 11/11/19 2015   • traZODone (DESYREL) tablet 50 mg  50 mg Oral Nightly Jermaine Perez MD   50 mg at 11/11/19 2015       ASSESSMENT/PLAN:    Acute on chronic respiratory failure with hypoxia and hypercapnia (CMS/HCC)    Anxiety    Atrial fibrillation (CMS/HCC)    Chronic kidney disease, stage III (moderate) (CMS/HCC)    Essential hypertension    Back pain    COPD with exacerbation (CMS/HCC)    Impaired mobility and ADLs    Anemia    Repeated falls    Bruise of both arms    Mr. Pena is currently hemodynamically stable.  His renal function seems to be improving slowly.  He continues to complain of right lower back pain.  Suspect that this may be secondary to muscle contusion related to his fall.  Will increase his narcotic dose as he has tolerated this well.  Flexeril will be scheduled instead of as needed basis.  Continue with lidocaine patch.  PT and OT are following.    He does have mild pedal edema of his bilateral lower extremities.  He takes Lasix 20 mg daily which is being continued.  He also takes Norvasc 5 mg daily which can be potentially contributing to the lower extremity edema.  As his blood pressure has been fairly well controlled and even on the low side I will go ahead and disc continue Norvasc for the time being.  Advised the patient to keep his legs elevated as much as possible.    As mentioned above, his most recent EGD was suggestive of possible  eosinophilic esophagitis.  Biopsy results are not yet available.  He did have empiric dilation of his distal esophagus but he did not have any findings consistent with esophageal stricture.  Per patient, there has not been any improvement despite having his esophagus dilated.  Do not feel that a repeat dilation would be of any use at this time if he was not found to have any stricture just 2 days ago.     Patient's urine culture grew out Corynebacterium mentioned before.  Continue with ceftriaxone to complete a 3-day course.    Start Tessalon Perles for persistent cough which is likely related to his advanced lung disease/COPD.    Patient is currently medically ready, he can be discharged when he has a bed available at short-term rehab.    Details were discussed with the patient.  I also discussed the details with the nursing staff.  Rest as ordered.    Ottoniel Hill MD  11/12/19  1:12 PM    Please note that portions of this note may have been completed with a voice recognition program. Efforts were made to edit the dictations, but occasionally words are mistranscribed.

## 2019-11-12 NOTE — PLAN OF CARE
Problem: Patient Care Overview  Goal: Plan of Care Review  Outcome: Ongoing (interventions implemented as appropriate)   11/12/19 5873   Coping/Psychosocial   Plan of Care Reviewed With patient   Plan of Care Review   Progress improving   OTHER   Outcome Summary Pt sat eob 15 min, performed 2x10 UB ex, pt cga supine to sit, sit to supine. Pt able to scoot up to head of bed without assistance.

## 2019-11-12 NOTE — PLAN OF CARE
Problem: Patient Care Overview  Goal: Plan of Care Review  Outcome: Ongoing (interventions implemented as appropriate)   11/12/19 2069   Coping/Psychosocial   Plan of Care Reviewed With patient   Plan of Care Review   Progress improving   OTHER   Outcome Summary Patient demonstrates improving strength and balance as seen by increased gait distance and quality. He is expected to benefit from additional PT services.

## 2019-11-12 NOTE — PLAN OF CARE
Problem: Patient Care Overview  Goal: Plan of Care Review  Outcome: Ongoing (interventions implemented as appropriate)   11/12/19 0515   Coping/Psychosocial   Plan of Care Reviewed With patient   Plan of Care Review   Progress no change   OTHER   Outcome Summary VSS, wore home bipap, continues to c/o r side rib area pain, generalized weakness     Goal: Individualization and Mutuality  Outcome: Ongoing (interventions implemented as appropriate)    Goal: Discharge Needs Assessment  Outcome: Ongoing (interventions implemented as appropriate)      Problem: Fall Risk (Adult)  Goal: Absence of Fall  Outcome: Ongoing (interventions implemented as appropriate)      Problem: Skin Injury Risk (Adult)  Goal: Skin Health and Integrity  Outcome: Ongoing (interventions implemented as appropriate)      Problem: Pain, Chronic (Adult)  Goal: Acceptable Pain/Comfort Level and Functional Ability  Outcome: Ongoing (interventions implemented as appropriate)      Problem: Hospitalized Acutely Ill Older (Adult)  Goal: Signs and Symptoms of Listed Potential Problems Will be Absent, Minimized or Managed (Hospitalized Acutely Ill Older)  Outcome: Ongoing (interventions implemented as appropriate)

## 2019-11-12 NOTE — THERAPY TREATMENT NOTE
Acute Care - Occupational Therapy Treatment Note   [facility]     Patient Name: Jani Pena  : 1950  MRN: [id number]  Today's Date: 2019  Onset of Illness/Injury or Date of Surgery: 11/10/19  Date of Referral to OT: 11/10/19  Referring Physician: Dr. Perez    Admit Date: 11/10/2019       ICD-10-CM ICD-9-CM   1. Acute on chronic respiratory failure with hypoxia and hypercapnia (CMS/HCC) J96.21 518.84    J96.22 786.09     799.02   2. Impaired mobility and ADLs Z74.09 799.89     Patient Active Problem List   Diagnosis   • Anxiety   • Atrial fibrillation (CMS/HCC)   • Chronic kidney disease, stage III (moderate) (CMS/HCC)   • Steroid-dependent COPD (CMS/HCC)   • Degeneration of cervical intervertebral disc   • GERD without esophagitis   • Diverticulosis   • Hemorrhoids   • Hiatal hernia   • Hyperlipidemia   • Essential hypertension   • Kyphosis, acquired   • PEDRO on CPAP   • Benign prostatic hyperplasia with incomplete bladder emptying   • History of arthritis   • Back pain   • Depression   • AQUINO (dyspnea on exertion)   • History of ventricular septal defect   • COPD with exacerbation (CMS/HCC)   • Impaired mobility and ADLs   • Chronic diastolic congestive heart failure (CMS/HCC)   • Coronary artery disease involving native coronary artery of native heart with angina pectoris (CMS/HCC)   • Lumbar radiculopathy   • Dysphagia   • Anemia   • Cardiac pacemaker in situ   • Sick sinus syndrome (CMS/HCC)   • Polypharmacy   • Chronic respiratory failure with hypoxia and hypercapnia (CMS/HCC)   • Hematoma   • Acute on chronic respiratory failure with hypoxia and hypercapnia (CMS/HCC)   • Repeated falls   • Bruise of both arms     Past Medical History:   Diagnosis Date   • Abnormal liver enzymes    • Anemia    • Anxiety    • Arthritis    • Atherosclerotic heart disease of native coronary artery without angina pectoris    • Atrial fibrillation (CMS/HCC)    • Back pain    • BPH (benign prostatic hyperplasia)    •  Cataract, bilateral    • Chest pain     2-3 MONTHS AGO.  PER PT, HAS ADDRESSED WITH CARDIOLOGIST.  STATES HAS NTG PRN.   • CHF (congestive heart failure) (CMS/HCC)    • Chronic bronchitis (CMS/HCC)    • Chronic kidney disease    • COPD (chronic obstructive pulmonary disease) (CMS/HCC)    • Degeneration of cervical intervertebral disc    • Depression    • Diverticulosis    • Dyspnea    • Esophageal reflux    • Esophagitis    • Gastritis    • Hematuria    • Hemorrhoids    • Hiatal hernia    • History of arthritis    • History of nuclear stress test     UNSURE OF DATE   • History of peripheral neuropathy    • History of ventricular septal defect    • History of ventricular septal defect    • Hyperlipidemia    • Hypertension    • Impaired functional mobility, balance, gait, and endurance    • Infectious diarrhea    • Kyphosis, acquired    • Lumbar radiculopathy    • Mitral and aortic valve disease    • Nephrolithiasis    • Phimosis    • Problems with swallowing     WITH FOOD   • Respiratory failure (CMS/HCC)    • Sleep apnea     BIPAP   • TIA (transient ischemic attack)     X3.   2014   • Ventral hernia    • Wears glasses     FOR READING     Past Surgical History:   Procedure Laterality Date   • CARDIAC CATHETERIZATION     • CARDIAC CATHETERIZATION N/A 5/24/2018    Procedure: Left Heart Cath;  Surgeon: Edouard Robertson MD;  Location:  [facility];  Service: Cardiovascular   • CARDIAC ELECTROPHYSIOLOGY PROCEDURE N/A 1/31/2019    Procedure: PACEMAKER IMPLANTATION- DC;  Surgeon: Omar Encinas DO;  Location:  [facility];  Service: Cardiology   • ENDOSCOPY N/A 1/26/2019    Procedure: ESOPHAGOGASTRODUODENOSCOPY;  Surgeon: Brunner, Mark I, MD;  Location:  [facility];  Service: Gastroenterology   • HERNIA REPAIR     • ORTHOPEDIC SURGERY Left     Left hip arthroplasty   • PACEMAKER IMPLANTATION  01/31/2019   • SUPRAPUBIC CATHETER INSERTION      History of Percutaneous Catheter Placement Into  Ureter   • THROAT SURGERY      FOR SLEEP APNEA   • VSD REPAIR      History of VSD Repair By Patch Closure       Therapy Treatment    Rehabilitation Treatment Summary     Row Name 11/12/19 1444             Treatment Time/Intention    Discipline  occupational therapist  -      Document Type  therapy note (daily note)  -      Subjective Information  complains of;weakness  -      Mode of Treatment  occupational therapy  -      Patient/Family Observations  Pt received supine in bed on O2  -      Care Plan Review  care plan/treatment goals reviewed;patient/other agree to care plan  -      Recorded by [] Akilah Gutierrez 11/12/19 1604      [facility] 11/12/19 1444             Vital Signs    O2 Delivery Pre Treatment  supplemental O2  -      O2 Delivery Intra Treatment  supplemental O2  -      O2 Delivery Post Treatment  supplemental O2  -      Recorded by [] Akilah Gutierrez 11/12/19 1604      [facility] 11/12/19 1444             Bed Mobility Assessment/Treatment    Bed Mobility Assessment/Treatment  supine-sit;sit-supine;scooting/bridging  -      Scooting/Bridging [place] (Bed Mobility)  contact guard  -      Supine-Sit [place] (Bed Mobility)  contact guard  -      Sit-Supine [place] (Bed Mobility)  contact guard  -      Assistive Device (Bed Mobility)  head of bed elevated  -      Recorded by [] Akilah Gutierrez 11/12/19 1604      [facility] 11/12/19 1444             Transfer Assessment/Treatment    Transfer Assessment/Treatment  --  -AH      Recorded by [] Akilah Gutierrez 11/12/19 1604      [facility] 11/12/19 1444             Sit-Stand Transfer    Sit-Stand [place] (Transfers)  --  -AH      Recorded by [] Akilah Gutierrez 11/12/19 1604      [facility] 11/12/19 1444             Stand-Sit Transfer    Stand-Sit [place] (Transfers)  --  -AH      Recorded by [] Akilah Gutierrez 11/12/19 1604      [facility] 11/12/19 1444             Positioning and Restraints    Pre-Treatment  Position  in bed  -AH      Post Treatment Position  bed  -AH      In Bed  supine;call light within reach;encouraged to call for assist  -AH      Recorded by [] Akilah Gutierrez 11/12/19 1604      Row Name 11/12/19 1444             Pain Assessment    Additional Documentation  Pain Scale: Numbers Pre/Post-Treatment (Group)  -      Recorded by [] Akilah Gutierrez 11/12/19 1604      Row Name 11/12/19 1444             Pain Scale: Numbers Pre/Post-Treatment    Pain Scale: Numbers, Pretreatment  4/10  -      Pain Scale: Numbers, Post-Treatment  4/10  -AH      Pain Location - Side  Right  -AH      Pain Location  flank  -AH      Pain Intervention(s)  Repositioned;Ambulation/increased activity  -      Recorded by [] Akilah Gutierrez 11/12/19 1604      Row Name                Wound 11/10/19 1413 Left anterior;upper arm Skin Tear    Wound - Properties Group Date first assessed: 11/10/19 [CB] Time first assessed: 1413 [CB] Side: Left [CB] Orientation: anterior;upper [CB] Location: arm [CB] Primary Wound Type: Skin tear [CB] Recorded by:  [CB] Mone Uribe RN 11/10/19 1413    Row Name                Wound 11/10/19 1414 Right parietal region Abrasion    Wound - Properties Group Date first assessed: 11/10/19 [CB] Time first assessed: 1414 [CB] Side: Right [CB] Location: parietal region [CB] Primary Wound Type: Abrasion [CB] Recorded by:  [CB] Mone Uribe RN 11/10/19 1414    Row Name                Wound 11/11/19 0600 Right anterior;distal;upper arm Skin Tear    Wound - Properties Group Date first assessed: 11/11/19 [RE] Time first assessed: 0600 [RE] Side: Right [RE] Orientation: anterior;distal;upper [RE] Location: arm [RE] Primary Wound Type: Skin tear [RE] Recorded by:  [RE] Agustina Lee RN 11/11/19 0801    Row Name 11/12/19 1444             Coping    Observed Emotional State  accepting;cooperative  -      Verbalized Emotional State  acceptance  -AH      Recorded by [] Akilah Gutierrez 11/12/19 1605       Row Name 11/12/19 1444             Plan of Care Review    Plan of Care Reviewed With  patient  -AH      Recorded by [] Akilah Gutierrez 11/12/19 1604      Row Name 11/12/19 1444             Outcome Summary/Treatment Plan (OT)    Daily Summary of Progress (OT)  progress toward functional goals as expected  -AH      Anticipated Discharge Disposition (OT)  inpatient rehabilitation facility  -AH      Recorded by [] Akilah Gutierrez 11/12/19 1604        User Key  (r) = Recorded By, (t) = Taken By, (c) = Cosigned By    Initials Name Effective Dates Discipline    Akilah Del Angel 03/07/18 -  OT    CB Mone Uribe RN 10/26/16 -  Nurse    Agustina Wylie RN 10/26/16 -  Nurse        Wound 11/10/19 1413 Left anterior;upper arm Skin Tear (Active)   Dressing Appearance dry;intact 11/12/2019  8:00 AM   Closure SELENE 11/12/2019  8:00 AM   Base pink 11/11/2019  8:00 PM   Periwound ecchymotic 11/11/2019  8:00 PM   Drainage Amount scant 11/11/2019  8:00 PM   Care, Wound cleansed with;sterile normal saline 11/11/2019  8:00 PM   Dressing Care, Wound dressing changed;non-adherent;gauze 11/11/2019  8:00 PM       Wound 11/10/19 1414 Right parietal region Abrasion (Active)   Closure Open to air 11/12/2019  8:00 AM   Base scab 11/12/2019  8:00 AM   Drainage Amount none 11/12/2019  8:00 AM       Wound 11/11/19 0600 Right anterior;distal;upper arm Skin Tear (Active)   Dressing Appearance dry;intact 11/12/2019  8:00 AM   Base pink 11/11/2019  8:00 PM   Periwound ecchymotic 11/12/2019  8:00 AM   Periwound Temperature warm 11/12/2019  8:00 AM   Periwound Skin Turgor soft 11/12/2019  8:00 AM   Drainage Amount none 11/12/2019  8:00 AM   Care, Wound cleansed with;sterile normal saline 11/11/2019  8:00 PM   Dressing Care, Wound dressing changed;non-adherent;gauze 11/11/2019  8:00 PM       Occupational Therapy Education     Title: PT OT SLP Therapies (Not Started)     Topic: Occupational Therapy (In Progress)     Point: ADL training (Done)      Description: Instruct learner(s) on proper safety adaptation and remediation techniques during self care or transfers.   Instruct in proper use of assistive devices.    Learning Progress Summary           Patient Acceptance, E,TB, VU by  at 11/12/2019  4:05 PM    Comment:  Pt educated on UB ex, proper positioning of finger splints, benefit of increased activity    Acceptance, E,TB, VU by  at 11/11/2019  3:48 PM    Comment:  Role of OT/POC   Family Acceptance, E,TB, VU by  at 11/11/2019  3:48 PM    Comment:  Role of OT/POC                   Point: Home exercise program (Done)     Description: Instruct learner(s) on appropriate technique for monitoring, assisting and/or progressing therapeutic exercises/activities.    Learning Progress Summary           Patient Acceptance, E,TB, VU by  at 11/12/2019  4:05 PM    Comment:  Pt educated on UB ex, proper positioning of finger splints, benefit of increased activity                               User Key     Initials Effective Dates Name Provider Type Discipline     03/07/18 -  Akilah Gutierrez Occupational Therapist OT                OT Recommendation and Plan  Outcome Summary/Treatment Plan (OT)  Daily Summary of Progress (OT): progress toward functional goals as expected  Anticipated Discharge Disposition (OT): inpatient rehabilitation facility  Therapy Frequency (OT Eval): 3 times/wk  Daily Summary of Progress (OT): progress toward functional goals as expected  Plan of Care Review  Plan of Care Reviewed With: patient  Plan of Care Reviewed With: patient  Outcome Summary: Pt sat eob 15 min, performed 2x10 UB ex, pt cga supine to sit, sit to supine.  Pt able to scoot up to head of bed without assistance.  Outcome Measures     Row Name 11/12/19 1444 11/11/19 1436          How much help from another is currently needed...    Putting on and taking off regular lower body clothing?  3  -  3  -AH     Bathing (including washing, rinsing, and drying)  3  -  3  -AH      Toileting (which includes using toilet bed pan or urinal)  3  -  3  -     Putting on and taking off regular upper body clothing  3  -  3  -     Taking care of personal grooming (such as brushing teeth)  4  -  4  -     Eating meals  4  -  4  -AH     AM-PAC 6 Clicks Score (OT)  20  -AH  20  -        Functional Assessment    Outcome Measure Options  AM-PAC 6 Clicks Daily Activity (OT)  -  AM-PAC 6 Clicks Daily Activity (OT)  -       User Key  (r) = Recorded By, (t) = Taken By, (c) = Cosigned By    Initials Name Provider Type    Akilah Del Angel Occupational Therapist           Time Calculation:   Time Calculation- OT     Row Name 11/12/19 1612             Time Calculation-     OT Start Time  1444  -      OT Stop Time  1520  -      OT Time Calculation (min)  36 min  -      OT Received On  11/12/19  -      OT Goal Re-Cert Due Date  11/21/19  -         Timed Charges    69857 - OT Therapeutic Exercise Minutes  15  -      64899 - OT Therapeutic Activity Minutes  21  -        User Key  (r) = Recorded By, (t) = Taken By, (c) = Cosigned By    Initials Name Provider Type    Akilah Del Angel Occupational Therapist        Therapy Charges for Today     Code Description Service Date Service Provider Modifiers Qty    48707734005 HC OT EVAL LOW COMPLEXITY 3 11/11/2019 Akilah Gutierrez 1    17873114981 HC OT THER PROC EA 15 MIN 11/12/2019 Akilah Gutierrez 1    50141660245 HC OT THERAPEUTIC ACT EA 15 MIN 11/12/2019 Akilah Gutierrez 1               Akilah Gutierrez  11/12/2019

## 2019-11-12 NOTE — THERAPY TREATMENT NOTE
Patient Name: Jani Pena  : 1950    MRN: [id number]                              Today's Date: 2019       Admit Date: 11/10/2019    Visit Dx:     ICD-10-CM ICD-9-CM   1. Acute on chronic respiratory failure with hypoxia and hypercapnia (CMS/HCC) J96.21 518.84    J96.22 786.09     799.02   2. Impaired mobility and ADLs Z74.09 799.89     Patient Active Problem List   Diagnosis   • Anxiety   • Atrial fibrillation (CMS/HCC)   • Chronic kidney disease, stage III (moderate) (CMS/HCC)   • Steroid-dependent COPD (CMS/HCC)   • Degeneration of cervical intervertebral disc   • GERD without esophagitis   • Diverticulosis   • Hemorrhoids   • Hiatal hernia   • Hyperlipidemia   • Essential hypertension   • Kyphosis, acquired   • PEDRO on CPAP   • Benign prostatic hyperplasia with incomplete bladder emptying   • History of arthritis   • Back pain   • Depression   • AQUINO (dyspnea on exertion)   • History of ventricular septal defect   • COPD with exacerbation (CMS/HCC)   • Impaired mobility and ADLs   • Chronic diastolic congestive heart failure (CMS/HCC)   • Coronary artery disease involving native coronary artery of native heart with angina pectoris (CMS/HCC)   • Lumbar radiculopathy   • Dysphagia   • Anemia   • Cardiac pacemaker in situ   • Sick sinus syndrome (CMS/HCC)   • Polypharmacy   • Chronic respiratory failure with hypoxia and hypercapnia (CMS/HCC)   • Hematoma   • Acute on chronic respiratory failure with hypoxia and hypercapnia (CMS/HCC)   • Repeated falls   • Bruise of both arms     Past Medical History:   Diagnosis Date   • Abnormal liver enzymes    • Anemia    • Anxiety    • Arthritis    • Atherosclerotic heart disease of native coronary artery without angina pectoris    • Atrial fibrillation (CMS/HCC)    • Back pain    • BPH (benign prostatic hyperplasia)    • Cataract, bilateral    • Chest pain     2-3 MONTHS AGO.  PER PT, HAS ADDRESSED WITH CARDIOLOGIST.  STATES HAS NTG PRN.   • CHF (congestive  heart failure) (CMS/HCC)    • Chronic bronchitis (CMS/HCC)    • Chronic kidney disease    • COPD (chronic obstructive pulmonary disease) (CMS/HCC)    • Degeneration of cervical intervertebral disc    • Depression    • Diverticulosis    • Dyspnea    • Esophageal reflux    • Esophagitis    • Gastritis    • Hematuria    • Hemorrhoids    • Hiatal hernia    • History of arthritis    • History of nuclear stress test     UNSURE OF DATE   • History of peripheral neuropathy    • History of ventricular septal defect    • History of ventricular septal defect    • Hyperlipidemia    • Hypertension    • Impaired functional mobility, balance, gait, and endurance    • Infectious diarrhea    • Kyphosis, acquired    • Lumbar radiculopathy    • Mitral and aortic valve disease    • Nephrolithiasis    • Phimosis    • Problems with swallowing     WITH FOOD   • Respiratory failure (CMS/HCC)    • Sleep apnea     BIPAP   • TIA (transient ischemic attack)     X3.   2014   • Ventral hernia    • Wears glasses     FOR READING     Past Surgical History:   Procedure Laterality Date   • CARDIAC CATHETERIZATION     • CARDIAC CATHETERIZATION N/A 5/24/2018    Procedure: Left Heart Cath;  Surgeon: Edouard Robertson MD;  Location:  [facility];  Service: Cardiovascular   • CARDIAC ELECTROPHYSIOLOGY PROCEDURE N/A 1/31/2019    Procedure: PACEMAKER IMPLANTATION- DC;  Surgeon: Omar Encinas DO;  Location:  [facility];  Service: Cardiology   • ENDOSCOPY N/A 1/26/2019    Procedure: ESOPHAGOGASTRODUODENOSCOPY;  Surgeon: Brunner, Mark I, MD;  Location:  [facility];  Service: Gastroenterology   • HERNIA REPAIR     • ORTHOPEDIC SURGERY Left     Left hip arthroplasty   • PACEMAKER IMPLANTATION  01/31/2019   • SUPRAPUBIC CATHETER INSERTION      History of Percutaneous Catheter Placement Into Ureter   • THROAT SURGERY      FOR SLEEP APNEA   • VSD REPAIR      History of VSD Repair By Patch Closure     General Information     Row  Name 11/12/19 1700          PT Evaluation Time/Intention    Document Type  therapy note (daily note)  -JR     Mode of Treatment  physical therapy  -JR     Row Name 11/12/19 1700          General Information    Existing Precautions/Restrictions  oxygen therapy device and L/min;fall  -JR       User Key  (r) = Recorded By, (t) = Taken By, (c) = Cosigned By    Initials Name Provider Type    Kaylee Lemos, PT Physical Therapist        Mobility     Row Name 11/12/19 1700          Bed Mobility Assessment/Treatment    Bed Mobility Assessment/Treatment  supine-sit;sit-supine  -JR     Supine-Sit [place] (Bed Mobility)  contact guard  -JR     Sit-Supine [place] (Bed Mobility)  contact guard  -JR     Assistive Device (Bed Mobility)  head of bed elevated;bed rails  -JR     Row Name 11/12/19 1700          Sit-Stand Transfer    Sit-Stand [place] (Transfers)  minimum assist (75% patient effort)  -JR     Assistive Device (Sit-Stand Transfers)  walker, front-wheeled  -JR     Row Name 11/12/19 1700          Gait/Stairs Assessment/Training    Gait/Stairs Assessment/Training  gait/ambulation assistive device  -JR     [place] Level (Gait)  minimum assist (75% patient effort)  -JR     Assistive Device (Gait)  walker, front-wheeled  -JR     Distance in Feet (Gait)  42 x 2  -JR     Pattern (Gait)  step-through  -JR     Deviations/Abnormal Patterns (Gait)  base of support, narrow;pati decreased;festinating/shuffling;gait speed decreased;stride length decreased  -JR     Bilateral Gait Deviations  forward flexed posture  -JR       User Key  (r) = Recorded By, (t) = Taken By, (c) = Cosigned By    Initials Name Provider Type    Kaylee Lemos, PT Physical Therapist        Obj/Interventions     Row Name 11/12/19 1700          Static Sitting Balance    Level of [place] (Unsupported Sitting, Static Balance)  conditional independence  -JR     Sitting Position (Unsupported Sitting, Static Balance)  sitting on edge of  bed  -JR     Row Name 11/12/19 1700          Dynamic Sitting Balance    Level of [place], Reaches Outside Midline (Sitting, Dynamic Balance)  supervision  -     Sitting Position, Reaches Outside Midline (Sitting, Dynamic Balance)  sitting on edge of bed  -JR     Row Name 11/12/19 1700          Static Standing Balance    Level of [place] (Supported Standing, Static Balance)  contact guard assist  -     Assistive Device Utilized (Supported Standing, Static Balance)  walker, rolling  -JR     Row Name 11/12/19 1700          Dynamic Standing Balance    Level of [place], Reaches Outside Midline (Standing, Dynamic Balance)  minimal assist, 75% patient effort  -     Assistive Device Utilized (Supported Standing, Dynamic Balance)  walker, rolling  -       User Key  (r) = Recorded By, (t) = Taken By, (c) = Cosigned By    Initials Name Provider Type    Kaylee Lemos PT Physical Therapist        Goals/Plan    No documentation.       Clinical Impression     Row Name 11/12/19 1700          Pain Assessment    Additional Documentation  Pain Scale: Numbers Pre/Post-Treatment (Group)  -JR     Row Name 11/12/19 1700          Pain Scale: Numbers Pre/Post-Treatment    Pain Scale: Numbers, Pretreatment  4/10  -     Pain Scale: Numbers, Post-Treatment  4/10  -JR     Pain Location - Side  Right  -JR     Pain Location - Orientation  posterior  -     Pain Location  flank  -JR     Pain Intervention(s)  Repositioned;Ambulation/increased activity  -JR     Row Name 11/12/19 1700          Plan of Care Review    Plan of Care Reviewed With  patient  -JR     Row Name 11/12/19 1700          Positioning and Restraints    Pre-Treatment Position  in bed  -     Post Treatment Position  bed  -     In Bed  supine;call light within reach;encouraged to call for assist  -       User Key  (r) = Recorded By, (t) = Taken By, (c) = Cosigned By    Initials Name Provider Type    Kaylee Lemos, PT Physical Therapist         Outcome Measures     Row Name 11/12/19 1824          How much help from another person do you currently need...    Turning from your back to your side while in flat bed without using bedrails?  3  -JR     Moving from lying on back to sitting on the side of a flat bed without bedrails?  3  -JR     Moving to and from a bed to a chair (including a wheelchair)?  3  -JR     Standing up from a chair using your arms (e.g., wheelchair, bedside chair)?  3  -JR     Climbing 3-5 steps with a railing?  2  -JR     To walk in hospital room?  2  -JR     AM-PAC 6 Clicks Score (PT)  16  -JR     Row Name 11/12/19 1824          Functional Assessment    Outcome Measure Options  AM-PAC 6 Clicks Basic Mobility (PT)  -       User Key  (r) = Recorded By, (t) = Taken By, (c) = Cosigned By    Initials Name Provider Type    Kaylee Lemos, PT Physical Therapist        Physical Therapy Education     Title: PT OT SLP Therapies (Not Started)     Topic: Physical Therapy (In Progress)     Point: Mobility training (Done)     Learning Progress Summary           Patient Acceptance, E,TB, VU by  at 11/12/2019  6:27 PM    Comment:  importance of mobility to recovery    Acceptance, E,TB, VU by  at 11/11/2019  3:29 PM    Comment:  Pt instructed on gait training and transfers with RW.   Family Acceptance, E,TB, VU by  at 11/11/2019  3:29 PM    Comment:  Pt instructed on gait training and transfers with RW.                   Point: Body mechanics (Done)     Learning Progress Summary           Patient Acceptance, E,TB, VU by  at 11/11/2019  3:29 PM    Comment:  Pt instructed on gait training and transfers with RW.   Family Acceptance, E,TB, VU by  at 11/11/2019  3:29 PM    Comment:  Pt instructed on gait training and transfers with RW.                   Point: Precautions (Done)     Learning Progress Summary           Patient Acceptance, E,TB, VU by  at 11/11/2019  3:29 PM    Comment:  Pt instructed on gait training and transfers with RW.    Family Acceptance, E,TB, VU by MR at 11/11/2019  3:29 PM    Comment:  Pt instructed on gait training and transfers with RW.                               User Key     Initials Effective Dates Name Provider Type Discipline     04/03/18 -  Kaylee Kurtz, PT Physical Therapist PT    MR 11/06/19 -  Hermelinda Donovan, PT Student PT Student PT              PT Recommendation and Plan     Plan of Care Reviewed With: patient  Progress: improving  Outcome Summary: Patient demonstrates improving strength and balance as seen by increased gait distance and quality.  He is expected to benefit from additional PT services.       Time Calculation:   PT Charges     Row Name 11/12/19 1831             Time Calculation    Start Time  1700  -JR      Stop Time  1724  -JR      Time Calculation (min)  24 min  -JR        User Key  (r) = Recorded By, (t) = Taken By, (c) = Cosigned By    Initials Name Provider Type    Kaylee Lemos, PT Physical Therapist        Therapy Charges for Today     Code Description Service Date Service Provider Modifiers Qty    56703975657 HC PT THERAPEUTIC ACT EA 15 MIN 11/12/2019 Kaylee Kurtz, PT GP 1    54437351466 HC GAIT TRAINING EA 15 MIN 11/12/2019 Kaylee Kurtz, PT GP 1          PT G-Codes  Outcome Measure Options: AM-PAC 6 Clicks Basic Mobility (PT)  AM-PAC 6 Clicks Score (PT): 16  AM-PAC 6 Clicks Score (OT): 20    Kaylee Kurtz, PT  11/12/2019

## 2019-11-12 NOTE — PROGRESS NOTES
Continued Stay Note  [facility]     Patient Name: Jani Pena  MRN: [id number]  Today's Date: 11/12/2019    Admit Date: 11/10/2019    Discharge Plan     Row Name 11/12/19 1623       Plan    Plan  Home with HH  vs Private pay rehab vs Assisted Living    Patient/Family in Agreement with Plan  yes    Plan Comments Spoke to Janice with [facility].  Pt does not have any [facility] days left for rehab.  They have a bed, but would be private pay (approx $7,000) and they require 1 mo in advance.      F/U with pt re DCP.  Pt reports that his first choice would be to go to assisted living.  He reports that he spoke with [facility] and they are planning to come to the hospital tomorrow to speak with him.  Options for personal care at [facility] disscussed, as well as, other assisted living facilities ([facility] and [facility]).  While in pt's room, called [facility] to inquire about PC beds.  VM left for admission to return call.  Pt currently has private paid sitters from 8:30 to 12:30 and 4:00 to 8:00 pm.  Options to increase services discussed and HH option to be provided at both assisted living and home.  Pt wants speak with [facility] tomorrow to see if they can accomodate what he needs.  Pt verbalize the understanding that [facility] has a bed and can accept as private pay.        Discharge Codes    No documentation.       Expected Discharge Date and Time     Expected Discharge Date Expected Discharge Time    Nov 13, 2019             Danielle Lundy RN

## 2019-11-13 ENCOUNTER — APPOINTMENT (OUTPATIENT)
Dept: GENERAL RADIOLOGY | Facility: HOSPITAL | Age: 69
End: 2019-11-13

## 2019-11-13 LAB
ALBUMIN SERPL-MCNC: 3.7 G/DL (ref 3.5–5.2)
ANION GAP SERPL CALCULATED.3IONS-SCNC: 13.1 MMOL/L (ref 5–15)
BUN BLD-MCNC: 58 MG/DL (ref 8–23)
BUN/CREAT SERPL: 28.9 (ref 7–25)
CALCIUM SPEC-SCNC: 9.5 MG/DL (ref 8.6–10.5)
CHLORIDE SERPL-SCNC: 102 MMOL/L (ref 98–107)
CO2 SERPL-SCNC: 29.9 MMOL/L (ref 22–29)
CREAT BLD-MCNC: 2.01 MG/DL (ref 0.76–1.27)
DEPRECATED RDW RBC AUTO: 47.7 FL (ref 37–54)
ERYTHROCYTE [DISTWIDTH] IN BLOOD BY AUTOMATED COUNT: 13.1 % (ref 12.3–15.4)
GFR SERPL CREATININE-BSD FRML MDRD: 33 ML/MIN/1.73
GLUCOSE BLD-MCNC: 123 MG/DL (ref 65–99)
HCT VFR BLD AUTO: 33 % (ref 37.5–51)
HGB BLD-MCNC: 10.2 G/DL (ref 13–17.7)
MCH RBC QN AUTO: 30.7 PG (ref 26.6–33)
MCHC RBC AUTO-ENTMCNC: 30.9 G/DL (ref 31.5–35.7)
MCV RBC AUTO: 99.4 FL (ref 79–97)
PHOSPHATE SERPL-MCNC: 4 MG/DL (ref 2.5–4.5)
PLATELET # BLD AUTO: 203 10*3/MM3 (ref 140–450)
PMV BLD AUTO: 9.2 FL (ref 6–12)
POTASSIUM BLD-SCNC: 4.8 MMOL/L (ref 3.5–5.2)
PROCALCITONIN SERPL-MCNC: 0.04 NG/ML (ref 0.1–0.25)
RBC # BLD AUTO: 3.32 10*6/MM3 (ref 4.14–5.8)
SODIUM BLD-SCNC: 145 MMOL/L (ref 136–145)
WBC NRBC COR # BLD: 10.93 10*3/MM3 (ref 3.4–10.8)

## 2019-11-13 PROCEDURE — 99232 SBSQ HOSP IP/OBS MODERATE 35: CPT | Performed by: INTERNAL MEDICINE

## 2019-11-13 PROCEDURE — 85027 COMPLETE CBC AUTOMATED: CPT | Performed by: INTERNAL MEDICINE

## 2019-11-13 PROCEDURE — 94799 UNLISTED PULMONARY SVC/PX: CPT

## 2019-11-13 PROCEDURE — 84145 PROCALCITONIN (PCT): CPT | Performed by: INTERNAL MEDICINE

## 2019-11-13 PROCEDURE — 25010000002 CEFTRIAXONE SODIUM-DEXTROSE 1-3.74 GM-%(50ML) RECONSTITUTED SOLUTION: Performed by: INTERNAL MEDICINE

## 2019-11-13 PROCEDURE — 97530 THERAPEUTIC ACTIVITIES: CPT

## 2019-11-13 PROCEDURE — 80069 RENAL FUNCTION PANEL: CPT | Performed by: INTERNAL MEDICINE

## 2019-11-13 PROCEDURE — 63710000001 PREDNISONE PER 5 MG: Performed by: INTERNAL MEDICINE

## 2019-11-13 PROCEDURE — 97116 GAIT TRAINING THERAPY: CPT

## 2019-11-13 PROCEDURE — 71045 X-RAY EXAM CHEST 1 VIEW: CPT

## 2019-11-13 PROCEDURE — 25010000002 METHYLPREDNISOLONE PER 125 MG: Performed by: INTERNAL MEDICINE

## 2019-11-13 RX ORDER — BISACODYL 10 MG
10 SUPPOSITORY, RECTAL RECTAL DAILY PRN
Status: DISCONTINUED | OUTPATIENT
Start: 2019-11-13 | End: 2019-11-14 | Stop reason: HOSPADM

## 2019-11-13 RX ORDER — POLYETHYLENE GLYCOL 3350 17 G/17G
17 POWDER, FOR SOLUTION ORAL DAILY PRN
Status: DISCONTINUED | OUTPATIENT
Start: 2019-11-13 | End: 2019-11-14 | Stop reason: HOSPADM

## 2019-11-13 RX ORDER — BISACODYL 5 MG/1
10 TABLET, DELAYED RELEASE ORAL ONCE
Status: COMPLETED | OUTPATIENT
Start: 2019-11-13 | End: 2019-11-13

## 2019-11-13 RX ORDER — DOCUSATE SODIUM 100 MG/1
100 CAPSULE, LIQUID FILLED ORAL 2 TIMES DAILY
Status: DISCONTINUED | OUTPATIENT
Start: 2019-11-13 | End: 2019-11-14 | Stop reason: HOSPADM

## 2019-11-13 RX ADMIN — PANTOPRAZOLE SODIUM 40 MG: 40 TABLET, DELAYED RELEASE ORAL at 05:52

## 2019-11-13 RX ADMIN — ESCITALOPRAM OXALATE 10 MG: 10 TABLET ORAL at 08:52

## 2019-11-13 RX ADMIN — LISINOPRIL 5 MG: 5 TABLET ORAL at 08:52

## 2019-11-13 RX ADMIN — POLYETHYLENE GLYCOL 3350 17 G: 17 POWDER, FOR SOLUTION ORAL at 22:49

## 2019-11-13 RX ADMIN — IPRATROPIUM BROMIDE AND ALBUTEROL SULFATE 3 ML: .5; 3 SOLUTION RESPIRATORY (INHALATION) at 20:40

## 2019-11-13 RX ADMIN — CEFTRIAXONE 1 G: 1 INJECTION, SOLUTION INTRAVENOUS at 11:50

## 2019-11-13 RX ADMIN — CYCLOBENZAPRINE HYDROCHLORIDE 5 MG: 10 TABLET, FILM COATED ORAL at 21:49

## 2019-11-13 RX ADMIN — IPRATROPIUM BROMIDE AND ALBUTEROL SULFATE 3 ML: .5; 3 SOLUTION RESPIRATORY (INHALATION) at 06:48

## 2019-11-13 RX ADMIN — METOPROLOL TARTRATE 25 MG: 25 TABLET ORAL at 08:47

## 2019-11-13 RX ADMIN — TAMSULOSIN HYDROCHLORIDE 0.4 MG: 0.4 CAPSULE ORAL at 21:49

## 2019-11-13 RX ADMIN — AMLODIPINE BESYLATE 5 MG: 5 TABLET ORAL at 08:51

## 2019-11-13 RX ADMIN — DOCUSATE SODIUM 100 MG: 100 CAPSULE, LIQUID FILLED ORAL at 21:49

## 2019-11-13 RX ADMIN — CYCLOBENZAPRINE HYDROCHLORIDE 5 MG: 10 TABLET, FILM COATED ORAL at 05:52

## 2019-11-13 RX ADMIN — METOPROLOL TARTRATE 25 MG: 25 TABLET ORAL at 21:49

## 2019-11-13 RX ADMIN — ASPIRIN 81 MG: 81 TABLET, COATED ORAL at 08:46

## 2019-11-13 RX ADMIN — FUROSEMIDE 20 MG: 20 TABLET ORAL at 08:49

## 2019-11-13 RX ADMIN — SODIUM CHLORIDE, PRESERVATIVE FREE 10 ML: 5 INJECTION INTRAVENOUS at 09:02

## 2019-11-13 RX ADMIN — POLYETHYLENE GLYCOL 3350 17 G: 17 POWDER, FOR SOLUTION ORAL at 13:10

## 2019-11-13 RX ADMIN — TRAZODONE HYDROCHLORIDE 50 MG: 50 TABLET ORAL at 21:49

## 2019-11-13 RX ADMIN — BISACODYL 10 MG: 5 TABLET, COATED ORAL at 13:10

## 2019-11-13 RX ADMIN — BUDESONIDE AND FORMOTEROL FUMARATE DIHYDRATE 2 PUFF: 80; 4.5 AEROSOL RESPIRATORY (INHALATION) at 20:40

## 2019-11-13 RX ADMIN — BUDESONIDE AND FORMOTEROL FUMARATE DIHYDRATE 2 PUFF: 80; 4.5 AEROSOL RESPIRATORY (INHALATION) at 06:48

## 2019-11-13 RX ADMIN — METHYLPREDNISOLONE SODIUM SUCCINATE 60 MG: 125 INJECTION, POWDER, FOR SOLUTION INTRAMUSCULAR; INTRAVENOUS at 21:00

## 2019-11-13 RX ADMIN — ATORVASTATIN CALCIUM 10 MG: 10 TABLET, FILM COATED ORAL at 21:49

## 2019-11-13 RX ADMIN — CYCLOBENZAPRINE HYDROCHLORIDE 5 MG: 10 TABLET, FILM COATED ORAL at 00:29

## 2019-11-13 RX ADMIN — METHYLPREDNISOLONE SODIUM SUCCINATE 60 MG: 125 INJECTION, POWDER, FOR SOLUTION INTRAMUSCULAR; INTRAVENOUS at 08:54

## 2019-11-13 RX ADMIN — TRAZODONE HYDROCHLORIDE 50 MG: 50 TABLET ORAL at 00:42

## 2019-11-13 RX ADMIN — PREDNISONE 10 MG: 10 TABLET ORAL at 08:44

## 2019-11-13 RX ADMIN — FINASTERIDE 5 MG: 5 TABLET, FILM COATED ORAL at 08:45

## 2019-11-13 RX ADMIN — CYCLOBENZAPRINE HYDROCHLORIDE 5 MG: 10 TABLET, FILM COATED ORAL at 14:23

## 2019-11-13 NOTE — THERAPY TREATMENT NOTE
Patient Name: Jani Pena  : 1950    MRN: [id number]                              Today's Date: 2019       Admit Date: 11/10/2019    Visit Dx:     ICD-10-CM ICD-9-CM   1. Acute on chronic respiratory failure with hypoxia and hypercapnia (CMS/HCC) J96.21 518.84    J96.22 786.09     799.02   2. Impaired mobility and ADLs Z74.09 799.89     Patient Active Problem List   Diagnosis   • Anxiety   • Atrial fibrillation (CMS/HCC)   • Chronic kidney disease, stage III (moderate) (CMS/HCC)   • Steroid-dependent COPD (CMS/HCC)   • Degeneration of cervical intervertebral disc   • GERD without esophagitis   • Diverticulosis   • Hemorrhoids   • Hiatal hernia   • Hyperlipidemia   • Essential hypertension   • Kyphosis, acquired   • PEDRO on CPAP   • Benign prostatic hyperplasia with incomplete bladder emptying   • History of arthritis   • Back pain   • Depression   • AQUINO (dyspnea on exertion)   • History of ventricular septal defect   • COPD with exacerbation (CMS/HCC)   • Impaired mobility and ADLs   • Chronic diastolic congestive heart failure (CMS/HCC)   • Coronary artery disease involving native coronary artery of native heart with angina pectoris (CMS/HCC)   • Lumbar radiculopathy   • Dysphagia   • Anemia   • Cardiac pacemaker in situ   • Sick sinus syndrome (CMS/HCC)   • Polypharmacy   • Chronic respiratory failure with hypoxia and hypercapnia (CMS/HCC)   • Hematoma   • Acute on chronic respiratory failure with hypoxia and hypercapnia (CMS/HCC)   • Repeated falls   • Bruise of both arms     Past Medical History:   Diagnosis Date   • Abnormal liver enzymes    • Anemia    • Anxiety    • Arthritis    • Atherosclerotic heart disease of native coronary artery without angina pectoris    • Atrial fibrillation (CMS/HCC)    • Back pain    • BPH (benign prostatic hyperplasia)    • Cataract, bilateral    • Chest pain     2-3 MONTHS AGO.  PER PT, HAS ADDRESSED WITH CARDIOLOGIST.  STATES HAS NTG PRN.   • CHF (congestive  heart failure) (CMS/HCC)    • Chronic bronchitis (CMS/HCC)    • Chronic kidney disease    • COPD (chronic obstructive pulmonary disease) (CMS/HCC)    • Degeneration of cervical intervertebral disc    • Depression    • Diverticulosis    • Dyspnea    • Esophageal reflux    • Esophagitis    • Gastritis    • Hematuria    • Hemorrhoids    • Hiatal hernia    • History of arthritis    • History of nuclear stress test     UNSURE OF DATE   • History of peripheral neuropathy    • History of ventricular septal defect    • History of ventricular septal defect    • Hyperlipidemia    • Hypertension    • Impaired functional mobility, balance, gait, and endurance    • Infectious diarrhea    • Kyphosis, acquired    • Lumbar radiculopathy    • Mitral and aortic valve disease    • Nephrolithiasis    • Phimosis    • Problems with swallowing     WITH FOOD   • Respiratory failure (CMS/HCC)    • Sleep apnea     BIPAP   • TIA (transient ischemic attack)     X3.   2014   • Ventral hernia    • Wears glasses     FOR READING     Past Surgical History:   Procedure Laterality Date   • CARDIAC CATHETERIZATION     • CARDIAC CATHETERIZATION N/A 5/24/2018    Procedure: Left Heart Cath;  Surgeon: Edouard Robertson MD;  Location:  [facility] CATH INVASIVE LOCATION;  Service: Cardiovascular   • CARDIAC ELECTROPHYSIOLOGY PROCEDURE N/A 1/31/2019    Procedure: PACEMAKER IMPLANTATION- DC;  Surgeon: Omar Encinas DO;  Location:  [facility] EP INVASIVE LOCATION;  Service: Cardiology   • ENDOSCOPY N/A 1/26/2019    Procedure: ESOPHAGOGASTRODUODENOSCOPY;  Surgeon: Brunner, Mark I, MD;  Location:  [facility] ENDOSCOPY;  Service: Gastroenterology   • HERNIA REPAIR     • ORTHOPEDIC SURGERY Left     Left hip arthroplasty   • PACEMAKER IMPLANTATION  01/31/2019   • SUPRAPUBIC CATHETER INSERTION      History of Percutaneous Catheter Placement Into Ureter   • THROAT SURGERY      FOR SLEEP APNEA   • VSD REPAIR      History of VSD Repair By Patch Closure     General Information     Row  Name 11/13/19 0940          PT Evaluation Time/Intention    Document Type  therapy note (daily note)  (Pended)   -MR     Mode of Treatment  physical therapy  (Pended)   -MR     Row Name 11/13/19 0940          General Information    Patient Profile Reviewed?  yes  (Pended)   -MR     Existing Precautions/Restrictions  oxygen therapy device and L/min;fall  (Pended)   -     Row Name 11/13/19 0940          Cognitive Assessment/Intervention- PT/OT    Orientation Status (Cognition)  oriented x 4  (Pended)   -     Row Name 11/13/19 0940          Safety Issues, Functional Mobility    Impairments Affecting Function (Mobility)  balance;coordination;endurance/activity tolerance;strength;pain  (Pended)   -MR       User Key  (r) = Recorded By, (t) = Taken By, (c) = Cosigned By    Initials Name Provider Type    Hermelinda Ramos, PT Student PT Student        Mobility     Row Name 11/13/19 0940          Bed Mobility Assessment/Treatment    Bed Mobility Assessment/Treatment  supine-sit;sit-supine  (Pended)   -MR     Scooting/Bridging [place] (Bed Mobility)  contact guard  (Pended)   -MR     Supine-Sit [place] (Bed Mobility)  contact guard  (Pended)   -MR     Sit-Supine [place] (Bed Mobility)  contact guard  (Pended)   -MR     Assistive Device (Bed Mobility)  head of bed elevated;bed rails  (Pended)   -MR     Row Name 11/13/19 0940          Sit-Stand Transfer    Sit-Stand [place] (Transfers)  minimum assist (75% patient effort)  (Pended)   -MR     Assistive Device (Sit-Stand Transfers)  walker, front-wheeled  (Pended)   -     Row Name 11/13/19 0940          Gait/Stairs Assessment/Training    Gait/Stairs Assessment/Training  gait/ambulation assistive device  (Pended)   -MR     [place] Level (Gait)  minimum assist (75% patient effort)  (Pended)   -MR     Assistive Device (Gait)  walker, front-wheeled  (Pended)   -MR     Distance in Feet (Gait)  48' x 2  (Pended)   -MR     Pattern (Gait)  step-through   (Pended)   -MR     Deviations/Abnormal Patterns (Gait)  base of support, narrow;pati decreased;festinating/shuffling;gait speed decreased;stride length decreased  (Pended)   -MR     Bilateral Gait Deviations  forward flexed posture  (Pended)   -MR       User Key  (r) = Recorded By, (t) = Taken By, (c) = Cosigned By    Initials Name Provider Type    Hermelinda Ramos, PT Student PT Student        Obj/Interventions     Row Name 11/13/19 0940          Static Sitting Balance    Level of [place] (Unsupported Sitting, Static Balance)  conditional independence  (Pended)   -MR     Sitting Position (Unsupported Sitting, Static Balance)  sitting on edge of bed  (Pended)   -MR     Time Able to Maintain Position (Unsupported Sitting, Static Balance)  2 to 3 minutes  (Pended)   -MR     Row Name 11/13/19 0940          Dynamic Sitting Balance    Level of [place], Reaches Outside Midline (Sitting, Dynamic Balance)  supervision  (Pended)   -MR     Sitting Position, Reaches Outside Midline (Sitting, Dynamic Balance)  sitting on edge of bed  (Pended)   -MR     Row Name 11/13/19 0940          Static Standing Balance    Level of [place] (Supported Standing, Static Balance)  contact guard assist  (Pended)   -MR     Time Able to Maintain Position (Supported Standing, Static Balance)  1 to 2 minutes  (Pended)   -MR     Assistive Device Utilized (Supported Standing, Static Balance)  walker, rolling  (Pended)   -MR     Row Name 11/13/19 0940          Dynamic Standing Balance    Level of [place], Reaches Outside Midline (Standing, Dynamic Balance)  minimal assist, 75% patient effort  (Pended)   -MR     Assistive Device Utilized (Supported Standing, Dynamic Balance)  walker, rolling  (Pended)   -MR       User Key  (r) = Recorded By, (t) = Taken By, (c) = Cosigned By    Initials Name Provider Type    Hermelinda Ramos, PT Student PT Student        Goals/Plan    No documentation.       Clinical Impression     Row Name  11/13/19 0940          Pain Assessment    Additional Documentation  Pain Scale: Numbers Pre/Post-Treatment (Group)  (Pended)   -MR Goodman Name 11/13/19 0940          Pain Scale: Numbers Pre/Post-Treatment    Pain Scale: Numbers, Pretreatment  0/10 - no pain  (Pended)   -MR     Pain Scale: Numbers, Post-Treatment  0/10 - no pain  (Pended)   -MR Goodman Name 11/13/19 0940          Physical Therapy Clinical Impression    Criteria for Skilled Interventions Met (PT Clinical Impression)  yes  (Pended)   -MR     Rehab Potential (PT Clinical Summary)  good, to achieve stated therapy goals  (Pended)   -MR Goodman Name 11/13/19 0940          Vital Signs    Pre SpO2 (%)  95  (Pended)   -MR     O2 Delivery Pre Treatment  supplemental O2  (Pended)  4 LPM  -MR     O2 Delivery Intra Treatment  supplemental O2  (Pended)  4 LPM  -MR     Post SpO2 (%)  94  (Pended)   -MR     O2 Delivery Post Treatment  supplemental O2  (Pended)  4 LPM  -MR     Pre Patient Position  Supine  (Pended)   -MR     Intra Patient Position  Standing  (Pended)   -MR     Post Patient Position  Supine  (Pended)   -MR Goodman Name 11/13/19 0940          Positioning and Restraints    Pre-Treatment Position  in bed  (Pended)   -MR     Post Treatment Position  bed  (Pended)   -MR     In Bed  supine;encouraged to call for assist;call light within reach  (Pended)   -MR       User Key  (r) = Recorded By, (t) = Taken By, (c) = Cosigned By    Initials Name Provider Type    Hermelinda Ramos, PT Student PT Student        Outcome Measures     Row Name 11/13/19 0940          How much help from another person do you currently need...    Turning from your back to your side while in flat bed without using bedrails?  4  (Pended)   -MR     Moving from lying on back to sitting on the side of a flat bed without bedrails?  3  (Pended)   -MR     Moving to and from a bed to a chair (including a wheelchair)?  3  (Pended)   -MR     Standing up from a chair using your arms (e.g.,  wheelchair, bedside chair)?  3  (Pended)   -MR     Climbing 3-5 steps with a railing?  2  (Pended)   -MR     To walk in hospital room?  3  (Pended)   -MR     AM-PAC 6 Clicks Score (PT)  18  (Pended)   -     Row Name 11/13/19 0940          Functional Assessment    Outcome Measure Options  AM-PAC 6 Clicks Basic Mobility (PT)  (Pended)   -MR       User Key  (r) = Recorded By, (t) = Taken By, (c) = Cosigned By    Initials Name Provider Type    Hermelinda Ramos, PT Student PT Student        Physical Therapy Education     Title: PT OT SLP Therapies (Done)     Topic: Physical Therapy (Done)     Point: Mobility training (Done)     Learning Progress Summary           Patient Acceptance, E,TB, VU by MR at 11/13/2019  1:23 PM    Comment:  Pt instructed on gait training and transfers with RW.    Acceptance, E,TB, VU by CC at 11/13/2019  1:02 PM    Acceptance, E,TB, VU by JR at 11/12/2019  6:27 PM    Comment:  importance of mobility to recovery    Acceptance, E,TB, VU by MR at 11/11/2019  3:29 PM    Comment:  Pt instructed on gait training and transfers with RW.   Family Acceptance, E,TB, VU by MR at 11/11/2019  3:29 PM    Comment:  Pt instructed on gait training and transfers with RW.                   Point: Home exercise program (Done)     Learning Progress Summary           Patient Acceptance, E,TB, VU by MR at 11/13/2019  1:23 PM    Comment:  Pt instructed on gait training and transfers with RW.    Acceptance, E,TB, VU by CC at 11/13/2019  1:02 PM                   Point: Body mechanics (Done)     Learning Progress Summary           Patient Acceptance, E,TB, VU by MR at 11/13/2019  1:23 PM    Comment:  Pt instructed on gait training and transfers with RW.    Acceptance, E,TB, VU by CC at 11/13/2019  1:02 PM    Acceptance, E,TB, VU by MR at 11/11/2019  3:29 PM    Comment:  Pt instructed on gait training and transfers with RW.   Family Acceptance, E,TB, VU by MR at 11/11/2019  3:29 PM    Comment:  Pt instructed on gait  training and transfers with RW.                   Point: Precautions (Done)     Learning Progress Summary           Patient Acceptance, E,TB, VU by MR at 11/13/2019  1:23 PM    Comment:  Pt instructed on gait training and transfers with RW.    Acceptance, E,TB, VU by CC at 11/13/2019  1:02 PM    Acceptance, E,TB, VU by MR at 11/11/2019  3:29 PM    Comment:  Pt instructed on gait training and transfers with RW.   Family Acceptance, E,TB, VU by MR at 11/11/2019  3:29 PM    Comment:  Pt instructed on gait training and transfers with RW.                               User Key     Initials Effective Dates Name Provider Type Discipline     04/03/18 -  Kaylee Kurtz, PT Physical Therapist PT    CC 10/26/16 -  [facility] RN Registered Nurse Nurse     11/06/19 -  Zion Hermelinda, PT Student PT Student PT              PT Recommendation and Plan  Planned Therapy Interventions (PT Eval): balance training, bed mobility training, gait training, transfer training, strengthening  Outcome Summary/Treatment Plan (PT)  Anticipated Equipment Needs at Discharge (PT): front wheeled walker  Anticipated Discharge Disposition (PT): assisted living facility ([facility]), inpatient rehabilitation facility  Plan of Care Reviewed With: (P) patient  Progress: (P) improving  Outcome Summary: (P) Pt increased ambulation to 48' x 2 with RW and min A. He demonstrates deficits in balance, coordination, strength, and mobility. Pt is expected to benefit from continued PT services prior to D/C.      Time Calculation:   PT Charges     Row Name 11/13/19 0940             Time Calculation    Start Time  0940  (Pended)   -MR      Stop Time  1004  (Pended)   -MR      Time Calculation (min)  24 min  (Pended)   -MR      PT Received On  11/13/19  (Pended)   -MR      PT Goal Re-Cert Due Date  11/21/19  (Pended)   -MR        User Key  (r) = Recorded By, (t) = Taken By, (c) = Cosigned By    Initials Name Provider Type    Hermelinda Ramos, PT Student PT  Student        Therapy Charges for Today     Code Description Service Date Service Provider Modifiers Qty    97720906772 HC GAIT TRAINING EA 15 MIN 11/13/2019 Hermelinda Donovan, PT Student GP 1    27836492658 HC PT THERAPEUTIC ACT EA 15 MIN 11/13/2019 Hermelinda Donovan, PT Student GP 1          PT G-Codes  Outcome Measure Options: (P) AM-PAC 6 Clicks Basic Mobility (PT)  AM-PAC 6 Clicks Score (PT): (P) 18  AM-PAC 6 Clicks Score (OT): 20    Hermelinda Donovan PT Student  11/13/2019

## 2019-11-13 NOTE — PROGRESS NOTES
Hospitalist Progress Note.    LOS: 3 days    Patient Care Team:  Miguel Rojas MD as PCP - General  Miguel Rojas MD as PCP - Family Medicine  Ulysses Bass MD as Cardiologist (Cardiology)    Chief Complaint:    Follow-up on hypercarbic respiratory failure, follow-up and generalized weakness    SUBJECTIVE:  She is seen and examined at the bedside this morning.  Continues to have a cough that is fairly productive.  He states that this is a chronic issue for him and he uses incentive spirometer and flutter valve at home.  Does not have any fever or chills associated with the cough.  It should also be noted that the patient finished 3 days of ceftriaxone recently for any UTI.  I did obtain a repeat chest x-ray this morning which showed atelectasis versus infiltrate.  Procalcitonin is negative.    Hospital course:  Patient is a 69-year-old male with medical history of COPD on home oxygen, sleep apnea on CPAP, A. fib not on anticoagulation due to multiple falls, chronic kidney disease stage III, hypertension, chronic back pain was brought into the ER after sustaining a fall at home.  He also reported shortness of breath, generalized weakness and back pain.  He has only apparently been home for a week after returning from short-term rehab in [place].  So far he has already fallen twice at home resulting in multiple bruises in his bilateral upper extremities.  He reported falling asleep the night before presentation and ending up on the floor where he remained until he was found by 1 of the home health nurses.  In the ER, he had normal vitals and labs showed elevated CO2 level at 63 with a pH of 7.32.  He reported not being able to wear his BiPAP due to being on the floor for the whole night.  His urinalysis was suggestive of possible UTI so the patient was placed on BiPAP, given steroids and antibiotics and admitted to the hospitalist service.    His blood gases showed significant improvement with nightly use of  "BiPAP.  Patient does have a cough which is a chronic issue but continues to complain of pain in his right lower back.  His CT scan did not reveal any hydronephrosis/nephrolithiasis.  CT chest did not reveal any pneumonia/fluid.  X-ray of his ribs did not reveal any fracture.  He did fall on the right when he sustained his fall at home and may have some muscle pain related to this.    Nursing noted that the patient had some difficulty with swallowing cardiology was even the dysphasia diet that is ordered for him currently.  I did review his EGD report that was done in [place] on 10/30/2019.  The gastroenterologist suspected he may have eosinophilic esophagitis and his distal esophagus was dilated empirically even though there was no stricture found.  I called for the results of the biopsy yesterday but they were not yet available.    Review of Systems:    The pertinent  ROS was done and it is noted above, rest  was negative.    OBJECTIVE:    Vital Signs  /75 (BP Location: Right arm, Patient Position: Lying)   Pulse 80   Temp 98 °F (36.7 °C) (Oral)   Resp 20   Ht 172.7 cm (68\")   Wt 80.7 kg (178 lb)   SpO2 93%   BMI 27.06 kg/m²       I/O this shift:  In: 390 [P.O.:240; IV Piggyback:150]  Out: 820 [Urine:820]    Intake/Output Summary (Last 24 hours) at 11/13/2019 1622  Last data filed at 11/13/2019 1312  Gross per 24 hour   Intake 792 ml   Output 2645 ml   Net -1853 ml       Physical Exam:    General Appearance: alert, oriented x 3, no acute distress, very pleasant  HEENT: pupils round and reactive to light, oral mucosa dry, extraocular movements intact.  Neck: supple, no JVD, trachea midline  Lungs: Clear to Auscultation, unlabored breathing effort, occasional rhonchi  Heart: RRR, normal S1 and S2, no S3, no rub  Abdomen: soft, non-tender, no palpable bladder, present bowel sounds to auscultation  Extremities: trace edema, cyanosis or clubbing.   Neuro: normal speech and mental status, grossly non " focal.     Results Review:    Results from last 7 days   Lab Units 11/13/19  0636 11/12/19  0642 11/11/19  0525 11/10/19  0935   SODIUM mmol/L 145 142 145 149*   POTASSIUM mmol/L 4.8 4.8 4.5 4.3   CHLORIDE mmol/L 102 101 103 105   CO2 mmol/L 29.9* 28.0 29.3* 31.9*   BUN mg/dL 58* 60* 57* 60*   CREATININE mg/dL 2.01* 2.05* 2.17* 2.77*   CALCIUM mg/dL 9.5 9.3 9.2 9.9   BILIRUBIN mg/dL  --   --  0.4 0.5   ALK PHOS U/L  --   --  55 74   ALT (SGPT) U/L  --   --  13 15   AST (SGOT) U/L  --   --  16 21   GLUCOSE mg/dL 123* 123* 146* 113*       Estimated Creatinine Clearance: 39.6 mL/min (A) (by C-G formula based on SCr of 2.01 mg/dL (H)).    Results from last 7 days   Lab Units 11/13/19  0636 11/12/19  0642 11/10/19  0935   MAGNESIUM mg/dL  --   --  2.1   PHOSPHORUS mg/dL 4.0 3.8  --              Results from last 7 days   Lab Units 11/13/19  0636 11/12/19  0642 11/11/19  0525 11/10/19  0935 11/08/19  1304   WBC 10*3/mm3 10.93* 10.64 7.45 8.01 8.86   HEMOGLOBIN g/dL 10.2* 9.6* 9.6* 10.3* 10.2*   PLATELETS 10*3/mm3 203 198 197 215 232               Imaging Results (Last 24 Hours)     Procedure Component Value Units Date/Time    XR Chest 1 View [[id number]] Collected:  11/13/19 1343     Updated:  11/13/19 1353    Narrative:       PROCEDURE: XR CHEST 1 VW-     HISTORY: persistent cough; J96.21-Acute and chronic respiratory failure  with hypoxia; J96.22-Acute and chronic respiratory failure with  hypercapnia; Z74.09-Other reduced mobility     COMPARISON: 11/11/2019.     FINDINGS: A left subclavian pacemaker is in place. A vagal nerve  stimulator is present in the right chest wall. The heart is normal in  size. The mediastinum is unremarkable. There are low lung volumes with  development of bibasilar opacities which may reflect atelectasis or  pneumonia. There is no pneumothorax.  There are no acute osseous  abnormalities.       Impression:       Low lung volumes with development of bibasilar opacities  which may reflect  atelectasis or pneumonia.     Continued followup is recommended.     This report was finalized on 11/13/2019 1:47 PM by Richelle Patel M.D..          amLODIPine 5 mg Oral Q24H   aspirin 81 mg Oral Daily   atorvastatin 10 mg Oral Nightly   budesonide-formoterol 2 puff Inhalation BID - RT   cyclobenzaprine 5 mg Oral Q8H   docusate sodium 100 mg Oral BID   escitalopram 10 mg Oral Daily   finasteride 5 mg Oral Daily   furosemide 20 mg Oral Daily   ipratropium-albuterol 3 mL Nebulization Q6H - RT   lidocaine 1 patch Transdermal Q24H   lisinopril 5 mg Oral Daily   methylPREDNISolone sodium succinate 60 mg Intravenous Q12H   metoprolol tartrate 25 mg Oral BID   pantoprazole 40 mg Oral Q AM   polyethylene glycol 17 g Oral Daily   predniSONE 10 mg Oral Daily With Breakfast   tamsulosin 0.4 mg Oral Nightly   traZODone 50 mg Oral Nightly          Medication Review:   Current Facility-Administered Medications   Medication Dose Route Frequency Provider Last Rate Last Dose   • ALPRAZolam (XANAX) tablet 0.5 mg  0.5 mg Oral Nightly PRN Jermaine Perez MD   0.5 mg at 11/10/19 2332   • amLODIPine (NORVASC) tablet 5 mg  5 mg Oral Q24H Jermaine Perez MD   5 mg at 11/13/19 0851   • aspirin EC tablet 81 mg  81 mg Oral Daily Jermaine Perez MD   81 mg at 11/13/19 0846   • atorvastatin (LIPITOR) tablet 10 mg  10 mg Oral Nightly Jermaine Perez MD   10 mg at 11/12/19 2048   • benzonatate (TESSALON) capsule 100 mg  100 mg Oral TID PRN Ottoniel Hill MD       • budesonide-formoterol (SYMBICORT) 80-4.5 MCG/ACT inhaler 2 puff  2 puff Inhalation BID - RT Jermaine Perez MD   2 puff at 11/13/19 0648   • carboxymethylcellulose (REFRESH PLUS) 0.5 % ophthalmic solution 1 drop  1 drop Both Eyes TID PRN Ottoniel Hill MD   1 drop at 11/12/19 1540   • cyclobenzaprine (FLEXERIL) tablet 5 mg  5 mg Oral Q8H Ottoniel Hill MD   5 mg at 11/13/19 1423   • docusate sodium (COLACE) capsule 100 mg  100 mg Oral BID Ottoniel Hill MD        • escitalopram (LEXAPRO) tablet 10 mg  10 mg Oral Daily eJrmaine Perez MD   10 mg at 11/13/19 0852   • finasteride (PROSCAR) tablet 5 mg  5 mg Oral Daily Jermaine Perez MD   5 mg at 11/13/19 0845   • furosemide (LASIX) tablet 20 mg  20 mg Oral Daily Jermaine Perez MD   20 mg at 11/13/19 0849   • HYDROcodone-acetaminophen (NORCO) 7.5-325 MG per tablet 1 tablet  1 tablet Oral Q6H PRN Ottoniel Hill MD       • ipratropium-albuterol (DUO-NEB) nebulizer solution 3 mL  3 mL Nebulization Q6H - RT Jermaine Perez MD   3 mL at 11/13/19 0648   • lidocaine (LIDODERM) 5 % 1 patch  1 patch Transdermal Q24H Ottoniel Hill MD   1 patch at 11/12/19 0945   • lisinopril (PRINIVIL,ZESTRIL) tablet 5 mg  5 mg Oral Daily eJrmaine Perez MD   5 mg at 11/13/19 0852   • methylPREDNISolone sodium succinate (SOLU-Medrol) injection 60 mg  60 mg Intravenous Q12H Jermaine Perez MD   60 mg at 11/13/19 0854   • metoprolol tartrate (LOPRESSOR) tablet 25 mg  25 mg Oral BID Jermaine Perez MD   25 mg at 11/13/19 0847   • pantoprazole (PROTONIX) EC tablet 40 mg  40 mg Oral Q AM Jermaine Perez MD   40 mg at 11/13/19 0552   • polyethylene glycol 3350 powder (packet)  17 g Oral Daily Ottoniel Hill MD   17 g at 11/13/19 1310   • predniSONE (DELTASONE) tablet 10 mg  10 mg Oral Daily With Breakfast Jermaine Perez MD   10 mg at 11/13/19 0844   • QUEtiapine (SEROquel) tablet 12.5 mg  12.5 mg Oral TID PRN Jermaine Perez MD       • sodium chloride 0.9 % flush 10 mL  10 mL Intravenous PRN Emergency, Triage Protocol, MD   10 mL at 11/13/19 0902   • tamsulosin (FLOMAX) 24 hr capsule 0.4 mg  0.4 mg Oral Nightly Jermaine Perez MD   0.4 mg at 11/12/19 2048   • traMADol (ULTRAM) tablet 50 mg  50 mg Oral Q4H PRN Kaylee Michael DO   50 mg at 11/11/19 2015   • traZODone (DESYREL) tablet 50 mg  50 mg Oral Nightly Jermaine Perez MD   50 mg at 11/13/19 0042       ASSESSMENT/PLAN:    Acute on chronic respiratory failure with hypoxia and  hypercapnia (CMS/HCC)    Anxiety    Atrial fibrillation (CMS/HCC)    Chronic kidney disease, stage III (moderate) (CMS/HCC)    Essential hypertension    Back pain    COPD with exacerbation (CMS/HCC)    Impaired mobility and ADLs    Anemia    Repeated falls    Bruise of both arms    She remains hemodynamically stable.  He does have a persistent cough, however this is likely secondary to advanced lung disease with chronic bronchitis.  Patient reports that this is no usual.  I do suspect that he has some atelectasis and he is splinting secondary to pain in his right back lower ribs.  He has been encouraged to use incentive spirometer and flutter valve which have been provided to him today.  I will also put him on Tussionex every 12 as needed.  Continue with BiPAP use as needed and at bedtime.    Patient appears fairly anxious about the possibility of being discharged.  I informed him that medically he is ready and once we have a facility that can accept him and that is approved by his insurance, he will have to make a decision regarding going home versus going to short-term rehab.    Details were discussed with the patient.  I also discussed the details with the nursing staff.  Rest as ordered.    Ottoniel Hill MD  11/13/19  4:22 PM    Please note that portions of this note may have been completed with a voice recognition program. Efforts were made to edit the dictations, but occasionally words are mistranscribed.

## 2019-11-13 NOTE — PROGRESS NOTES
Discharge Planning Assessment   [place]     Patient Name: Jani Pena  MRN: [id number]  Today's Date: 11/13/2019    Admit Date: 11/10/2019    Discharge Needs Assessment    No documentation.       Discharge Plan     Row Name 11/13/19 1291       Plan    Plan Comments  Janice from [facility] states coming to see patient  they will accept him if insurance approves.   [facility] states no    Row Name 11/13/19 1416       Plan    Plan  Lace review, pt has used all his medicare days per huddle. SW has checked several times to complete living will, he declines, asked to call nurse to contact me if needed. Pt. may go to assisted living will or private pay to [facility].    Row Name 11/13/19 1897       Plan    Plan  Rehab    Patient/Family in Agreement with Plan  yes    Plan Comments  F/U with pt in room re DCP.  He reports that he would like to go to rehab and reports that he called his insurance and was told he has 65 days remaining, but would have a $30 copay per day.  [facility] and [facility]  Called [facility]; spoke with Janice.  She will re-verify insurance.  Called [facility]; spoke with [facility].  He reports they have 1 male semi-private room.  He request referral and they will check insurance.  He reports that pt's insurance now requires a precert.   Referral efaxed.  Pt updated.        Destination      Service Provider Request Status Selected Services Address Phone Number Fax Number    [facility] Pending - No Request Sent N/A [address] [facility] [address] [phone]    [facility] Declined  No male beds. N/A [address] [place] [address] [phone]      Durable Medical Equipment      No service coordination in this encounter.      Dialysis/Infusion      No service coordination in this encounter.      Home Medical Care      No service coordination in this encounter.      Therapy      No service  coordination in this encounter.      Community Resources      No service coordination in this encounter.        Expected Discharge Date and Time     Expected Discharge Date Expected Discharge Time    Nov 14, 2019         Demographic Summary    No documentation.       Functional Status    No documentation.       Psychosocial    No documentation.       Abuse/Neglect    No documentation.       Legal    No documentation.       Substance Abuse    No documentation.       Patient Forms    No documentation.           Danielle Mcfarlane RN

## 2019-11-13 NOTE — PLAN OF CARE
Problem: Patient Care Overview  Goal: Plan of Care Review  Outcome: Ongoing (interventions implemented as appropriate)   11/13/19 1301   Coping/Psychosocial   Plan of Care Reviewed With patient   Plan of Care Review   Progress improving       Problem: Fall Risk (Adult)  Goal: Absence of Fall  Outcome: Ongoing (interventions implemented as appropriate)      Problem: Skin Injury Risk (Adult)  Goal: Skin Health and Integrity  Outcome: Ongoing (interventions implemented as appropriate)      Problem: Pain, Chronic (Adult)  Goal: Acceptable Pain/Comfort Level and Functional Ability  Outcome: Ongoing (interventions implemented as appropriate)      Problem: Hospitalized Acutely Ill Older (Adult)  Goal: Signs and Symptoms of Listed Potential Problems Will be Absent, Minimized or Managed (Hospitalized Acutely Ill Older)  Outcome: Ongoing (interventions implemented as appropriate)

## 2019-11-13 NOTE — PLAN OF CARE
Problem: Patient Care Overview  Goal: Plan of Care Review  Outcome: Ongoing (interventions implemented as appropriate)   11/13/19 0513   Coping/Psychosocial   Plan of Care Reviewed With patient   Plan of Care Review   Progress improving   OTHER   Outcome Summary rested well, VSS, pain controlled with meds, continues to have occassional cough     Goal: Individualization and Mutuality  Outcome: Ongoing (interventions implemented as appropriate)    Goal: Discharge Needs Assessment  Outcome: Ongoing (interventions implemented as appropriate)      Problem: Fall Risk (Adult)  Goal: Absence of Fall  Outcome: Ongoing (interventions implemented as appropriate)      Problem: Skin Injury Risk (Adult)  Goal: Skin Health and Integrity  Outcome: Ongoing (interventions implemented as appropriate)      Problem: Pain, Chronic (Adult)  Goal: Acceptable Pain/Comfort Level and Functional Ability  Outcome: Ongoing (interventions implemented as appropriate)      Problem: Hospitalized Acutely Ill Older (Adult)  Goal: Signs and Symptoms of Listed Potential Problems Will be Absent, Minimized or Managed (Hospitalized Acutely Ill Older)  Outcome: Ongoing (interventions implemented as appropriate)

## 2019-11-13 NOTE — PROGRESS NOTES
Continued Stay Note  [facility]     Patient Name: Jani Pena  MRN: [id number]  Today's Date: 11/13/2019    Admit Date: 11/10/2019    Discharge Plan     Row Name 11/13/19 1158       Plan    Plan  Rehab    Patient/Family in Agreement with Plan  yes    Plan Comments F/U with pt in room re OKP.  He reports that he would like to go to rehab and reports that he called his insurance and was told he has 65 days remaining, but would have a $30 copay per day.  [facility]      Call[facility] ([facility] had been the choice in prior convsation); spoke with Janice.  She will re-verify insurance.      Called [facility]; spoke with [facility].  He reports they have 1 male semi-private room.  He request referral and they will check insurance.  He reports that pt's insurance now requires a precert.   Referral efaxed.      Received call from W[facility] with [facility]. They are not able to accept at this time d/t no male beds.  Pt updated.        Discharge Codes    No documentation.       Expected Discharge Date and Time     Expected Discharge Date Expected Discharge Time    Nov 13, 2019             Danielle Lundy RN

## 2019-11-13 NOTE — DISCHARGE PLACEMENT REQUEST
"JALYN Negrete RN  Case Management  Phone:  [phone]; Fax:  [phone]    New referral for  Rehab.    Karina Pena (69 y.o. Male)     Date of Birth Social Security Number Address Home Phone MRN    1950  [address] [facility] [place] [address] [phone] [id number]    Yazidism Marital Status          Moravian        Admission Date Admission Type Admitting Provider Attending Provider Department, Room/Bed    11/10/19 Emergency Ottoniel Hill MD Majumder, Mosumi, MD [facility] MED SURG  3, 327/1    Discharge Date Discharge Disposition Discharge Destination                       Attending Provider:  Ottoniel Hill MD    Allergies:  Mucomyst [Acetylcysteine], Sulfa Antibiotics    Isolation:  None   Infection:  None   Code Status:  CPR    Ht:  172.7 cm (68\")   Wt:  80.7 kg (178 lb)    Admission Cmt:  None   Principal Problem:  Acute on chronic respiratory failure with hypoxia and hypercapnia (CMS/HCC) [J96.21,J96.22]                 Active Insurance as of 11/10/2019     Primary Coverage     Payor Plan Insurance Group Employer/Plan Group    [facility] MEDICARE REPLACEMENT [facility] [id number]     Payor Plan Address Payor Plan Phone Number Payor Plan Fax Number Effective Dates    [address] [address]   2016 - None Entered    [facility] [id number]       Subscriber Name Subscriber Birth Date Member ID       KARINA PENA 1950 [id number]                 Emergency Contacts      (Rel.) Home Phone Work Phone Mobile Phone    RustamApril (Daughter) [phone] -- [phone]    Jean Marie Pena (Brother) [phone] -- [phone]    Minda Pena (Mother) [phone] -- [phone]               History & Physical      Jermaine Perez MD at 11/10/19 1510              [facility] MEDICINE   HISTORY AND PHYSICAL      Name:  Karina Pena   Age:  69 y.o.  Sex:  male  :  1950  MRN:  [id number]   Visit Number: "  [id number]  Admission Date:  11/10/2019  Date Of Service:  11/10/19  Primary Care Physician:  Miguel Rojas MD     Admitting diagnosis:      Acute on chronic respiratory failure with hypoxia and hypercapnia (CMS/HCC)    COPD with exacerbation (CMS/HCC)    Repeated falls    Anxiety    Atrial fibrillation (CMS/HCC)    Chronic kidney disease, stage III (moderate) (CMS/HCC)    Essential hypertension    Back pain    Impaired mobility and ADLs    Anemia    Bruise of both arms        History Of Presenting Illness:      69-year-old patient comes into the ER because of multiple falls and multiple other complaints including breathing trouble weakness and back pain.  Apparently he has a past medical history of COPD on home oxygen, sleep apnea who uses CPAP, history of atrial fibrillation not on anticoagulation due to falls, chronic kidney disease, hypertension, back pain, who recently was in the rehab and discharge about a week or 2 ago from [facility] as been having repeated falls.  He did have a fall last week for which he visited his primary physician and a CT scan as well as work-up was done which was unremarkable except multiple bruises noted on upper extremities as well as lower extremities mainly involving the right thigh.  Today patient again had a fall and has been complaining more of back pain so he came into the ER.  In the ER evaluation done showed he has COPD exacerbation with hypercapnia along with fall and is very weak to do his daily activities.  Patient was given a steroid and there was a possibility of UTI so a dose of Rocephin was given with neb and further admitted for management of the above.    He states that he has been having this back pain which is worse and could be having UTI as he is having some dysuria.  UA did show few WBCs but nitrite negative.  His white count is 11,000 and of note his ABG shows hypercapnia with PCO2 of 62.  He has not yet had an x-ray in the ER and patient states that  he has been coughing some.  Patient has been further admitted for exacerbation of COPD and with multiple falls needing rehab and may benefit from a inpatient rehab transfer upon discharge    Review Of Systems:     The following systems were reviewed and negative;  constitution, eyes, ENT, respiratory, cardiovascular, gastrointestinal, genitourinary, musculoskeletal, neurological and behavioral/psych,  Skin except as above.     Past Medical History:    Past Medical History:   Diagnosis Date   • Abnormal liver enzymes    • Anemia    • Anxiety    • Arthritis    • Atherosclerotic heart disease of native coronary artery without angina pectoris    • Atrial fibrillation (CMS/HCC)    • Back pain    • BPH (benign prostatic hyperplasia)    • Cataract, bilateral    • Chest pain     2-3 MONTHS AGO.  PER PT, HAS ADDRESSED WITH CARDIOLOGIST.  STATES HAS NTG PRN.   • CHF (congestive heart failure) (CMS/HCC)    • Chronic bronchitis (CMS/HCC)    • Chronic kidney disease    • COPD (chronic obstructive pulmonary disease) (CMS/HCC)    • Degeneration of cervical intervertebral disc    • Depression    • Diverticulosis    • Dyspnea    • Esophageal reflux    • Esophagitis    • Gastritis    • Hematuria    • Hemorrhoids    • Hiatal hernia    • History of arthritis    • History of nuclear stress test     UNSURE OF DATE   • History of peripheral neuropathy    • History of ventricular septal defect    • History of ventricular septal defect    • Hyperlipidemia    • Hypertension    • Infectious diarrhea    • Kyphosis, acquired    • Lumbar radiculopathy    • Mitral and aortic valve disease    • Nephrolithiasis    • Phimosis    • Problems with swallowing     WITH FOOD   • Respiratory failure (CMS/HCC)    • Sleep apnea     BIPAP   • TIA (transient ischemic attack)     X3.   2014   • Ventral hernia    • Wears glasses     FOR READING       Past Surgical history:    Past Surgical History:   Procedure Laterality Date   • CARDIAC CATHETERIZATION     •  CARDIAC CATHETERIZATION N/A 2018    Procedure: Left Heart Cath;  Surgeon: Edouard Robertson MD;  Location:  [facility];  Service: Cardiovascular   • CARDIAC ELECTROPHYSIOLOGY PROCEDURE N/A 2019    Procedure: PACEMAKER IMPLANTATION- DC;  Surgeon: Omar Encinas DO;  Location:  [facility] EP INVASIVE [facility];  Service: Cardiology   • ENDOSCOPY N/A 2019    Procedure: ESOPHAGOGASTRODUODENOSCOPY;  Surgeon: Brunner, Mark I, MD;  Location:  [facility] ENDOSCOPY;  Service: Gastroenterology   • HERNIA REPAIR     • ORTHOPEDIC SURGERY Left     Left hip arthroplasty   • PACEMAKER IMPLANTATION  2019   • SUPRAPUBIC CATHETER INSERTION      History of Percutaneous Catheter Placement Into Ureter   • THROAT SURGERY      FOR SLEEP APNEA   • VSD REPAIR      History of VSD Repair By Patch Closure       Social History:    Social History     Socioeconomic History   • Marital status:      Spouse name: Not on file   • Number of children: Not on file   • Years of education: Not on file   • Highest education level: Not on file   Occupational History     Employer: RETIRED   Tobacco Use   • Smoking status: Former Smoker     Packs/day: 3.00     Years: 30.00     Pack years: 90.00     Types: Cigarettes     Last attempt to quit: 2017     Years since quittin.8   • Smokeless tobacco: Never Used   Substance and Sexual Activity   • Alcohol use: No   • Drug use: No   • Sexual activity: Defer       Family History:    Family History   Problem Relation Age of Onset   • Other Father         CABG   • Coronary artery disease Father          Allergies:      Mucomyst [acetylcysteine] and Sulfa antibiotics    Home Medications:    Prior to Admission Medications     Prescriptions Last Dose Informant Patient Reported? Taking?    alfuzosin (UROXATRAL) 10 MG 24 hr tablet 2019  No Yes    Take 1 tablet by mouth Daily.    ALPRAZolam (XANAX) 0.5 MG tablet Past Week  No Yes    TAKE ONE TABLET BY MOUTH ONCE NIGHTLY AS NEEDED FOR  ANXIETY    amLODIPine (NORVASC) 5 MG tablet 11/9/2019  No Yes    Take 1 tablet by mouth Daily.    ASPIRIN ADULT LOW STRENGTH 81 MG EC tablet 11/9/2019  No Yes    TAKE ONE TABLET BY MOUTH DAILY    atorvastatin (LIPITOR) 10 MG tablet 11/9/2019  No Yes    Take 1 tablet by mouth Every Night.    clopidogrel (PLAVIX) 75 MG tablet 11/9/2019  No Yes    Take 1 tablet by mouth Daily.    escitalopram (LEXAPRO) 20 MG tablet 11/9/2019  No Yes    Take 1 tablet by mouth Daily.    finasteride (PROSCAR) 5 MG tablet 11/9/2019  No Yes    Take 1 tablet by mouth Daily.    Fluticasone Furoate-Vilanterol (BREO ELLIPTA) 200-25 MCG/INH inhaler 11/9/2019  No Yes    Inhale 1 puff Daily.    furosemide (LASIX) 20 MG tablet 11/9/2019  No Yes    Take 1 tablet by mouth Daily.    ipratropium-albuterol (DUO-NEB) 0.5-2.5 mg/mL nebulizer 11/9/2019  No Yes    Take 3 mL by nebulization Every 6 (Six) Hours.    lisinopril (PRINIVIL,ZESTRIL) 5 MG tablet 11/9/2019  No Yes    Take 1 tablet by mouth Daily.    melatonin 5 MG tablet tablet 11/9/2019  No Yes    ONE BY MOUTH EVERY NIGHT AT BEDTIME    metoprolol tartrate (LOPRESSOR) 25 MG tablet 11/9/2019  No Yes    TAKE ONE TABLET BY MOUTH TWICE A DAY    O2 (OXYGEN)  Self Yes Yes    Inhale 4 L/min 1 (One) Time. Walking on 4L and sitting 3L    pantoprazole (PROTONIX) 40 MG EC tablet 11/9/2019  No Yes    TAKE ONE TABLET BY MOUTH DAILY    predniSONE (DELTASONE) 10 MG tablet 11/9/2019  No Yes    Take 1 tablet by mouth Daily With Breakfast.    QUEtiapine (SEROquel) 25 MG tablet 11/9/2019  No Yes    Take 0.5 tablets by mouth 3 (Three) Times a Day As Needed (anxiety/agitation).    traZODone (DESYREL) 50 MG tablet 11/9/2019  No Yes    Take 1 tablet by mouth Every Night.                 Vital Signs:    Temp:  [97.9 °F (36.6 °C)] 97.9 °F (36.6 °C)  Heart Rate:  [58-75] 64  Resp:  [13-22] 16  BP: ()/(56-71) 127/62        11/10/19  0923 11/10/19  1314   Weight: 76.2 kg (168 lb) 78.7 kg (173 lb 8 oz)       Body mass  index is 26.38 kg/m².    Physical Exam:      General Appearance:    Alert and cooperative,  And lying comfortably in bed   Head:    Atraumatic and normocephalic, without obvious abnormality.   Eyes:            PERRLA, conjunctivae and sclerae normal, no Icterus. No pallor. Extraocular movements are within normal limits.   Ears:    Ears appear intact with no abnormalities noted.   Throat:   No oral lesions, no thrush, oral mucosa moist.   Neck:   Supple, trachea midline, no thyromegaly, no carotid bruit, no lymphadenopathy   Lungs:     Chest shape is normal. Breath sounds heard bilaterally equally.  Is mild wheezes more prominent on the right side than the left and with minimal crepitations no Pleural rub or bronchial breathing.      Heart:    Normal S1 and S2, no murmur, no gallop, no rub. No JVD   Abdomen:     Normal bowel sounds, no masses, no organomegaly. Soft        non-tender, non-distended, no guarding, no rebound                tenderness   Extremities:   Moves all extremities well, no edema, no cyanosis, no             clubbing   Skin:  Skin all over the upper and lower extremity shows multiple fluid bruises different stages but worse is over the right thigh medially as well as the left medially there is no skin tear noted   Neurologic:   Cranial nerves 2 - 12 grossly intact, sensation intact, Motor power is normal and equal bilaterally.  His gait has not been checked as he has been falling multiple times but moves all extremities.       EKG:      Patient with right bundle branch block which is been there in the past    Labs:    Lab Results (last 24 hours)     Procedure Component Value Units Date/Time    CK [[id number]]  (Abnormal) Collected:  11/10/19 0935    Specimen:  Blood Updated:  11/10/19 1149     Creatine Kinase 231 U/L     Lactic Acid, Plasma [[id number]]  (Normal) Collected:  11/10/19 1110    Specimen:  Blood Updated:  11/10/19 1139     Lactate 1.1 mmol/L     Urinalysis, Microscopic Only - Urine,  Clean Catch [[id number]]  (Abnormal) Collected:  11/10/19 1037    Specimen:  Urine, Clean Catch Updated:  11/10/19 1104     RBC, UA None Seen /HPF      WBC, UA 6-12 /HPF      Bacteria, UA Trace /HPF      Squamous Epithelial Cells, UA 0-2 /HPF      Hyaline Casts, UA 0-2 /LPF      Methodology Manual Light Microscopy    Urine Culture - Urine, Urine, Clean Catch [[id number]] Collected:  11/10/19 1037    Specimen:  Urine, Clean Catch Updated:  11/10/19 1104    Riddle Draw [[id number]] Collected:  11/10/19 0935    Specimen:  Blood Updated:  11/10/19 1045    Narrative:       The following orders were created for panel order Riddle Draw.  Procedure                               Abnormality         Status                     ---------                               -----------         ------                     Light Blue Top[[id number]]                                   Final result               Green Top (Gel)[[id number]]                                  Final result               Lavender Top[[id number]]                                     Final result               Gold Top - SST[[id number]]                                   Final result               Green Top (No Gel)[[id number]]                               In process                   Please view results for these tests on the individual orders.    Light Blue Top [[id number]] Collected:  11/10/19 0935    Specimen:  Blood Updated:  11/10/19 1045     Extra Tube hold for add-on     Comment: Auto resulted       Green Top (Gel) [[id number]] Collected:  11/10/19 0935    Specimen:  Blood Updated:  11/10/19 1045     Extra Tube Hold for add-ons.     Comment: Auto resulted.       Lavender Top [[id number]] Collected:  11/10/19 0935    Specimen:  Blood Updated:  11/10/19 1045     Extra Tube hold for add-on     Comment: Auto resulted       Gold Top - SST [[id number]] Collected:  11/10/19 0935    Specimen:  Blood Updated:  11/10/19 1045     Extra Tube Hold for add-ons.     Comment: Auto  resulted.       Urinalysis With Culture If Indicated - Urine, Clean Catch [[id number]]  (Abnormal) Collected:  11/10/19 1037    Specimen:  Urine, Clean Catch Updated:  11/10/19 1045     Color, UA Yellow     Appearance, UA Clear     pH, UA <=5.0     Specific Gravity, UA 1.013     Glucose, UA Negative     Ketones, UA Negative     Bilirubin, UA Negative     Blood, UA Negative     Protein, UA Negative     Leuk Esterase, UA Small (1+)     Nitrite, UA Negative     Urobilinogen, UA 0.2 E.U./dL    Blood Culture - Blood, Hand, Right [[id number]] Collected:  11/10/19 1027    Specimen:  Blood from Hand, Right Updated:  11/10/19 1034    Blood Culture - Blood, Hand, Left [[id number]] Collected:  11/10/19 1012    Specimen:  Blood from Hand, Left Updated:  11/10/19 1033    Comprehensive Metabolic Panel [[id number]]  (Abnormal) Collected:  11/10/19 0935    Specimen:  Blood Updated:  11/10/19 1023     Glucose 113 mg/dL      BUN 60 mg/dL      Creatinine 2.77 mg/dL      Sodium 149 mmol/L      Potassium 4.3 mmol/L      Chloride 105 mmol/L      CO2 31.9 mmol/L      Calcium 9.9 mg/dL      Total Protein 6.6 g/dL      Albumin 4.00 g/dL      ALT (SGPT) 15 U/L      AST (SGOT) 21 U/L      Alkaline Phosphatase 74 U/L      Total Bilirubin 0.5 mg/dL      eGFR Non African Amer 23 mL/min/1.73      Globulin 2.6 gm/dL      A/G Ratio 1.5 g/dL      BUN/Creatinine Ratio 21.7     Anion Gap 12.1 mmol/L     Narrative:       GFR Normal >60  Chronic Kidney Disease <60  Kidney Failure <15    Troponin [[id number]]  (Abnormal) Collected:  11/10/19 0935    Specimen:  Blood Updated:  11/10/19 1023     Troponin T 0.033 ng/mL     Narrative:       Troponin T Reference Range:  <= 0.03 ng/mL-   Negative for AMI  >0.03 ng/mL-     Abnormal for myocardial necrosis.  Clinicians would have to utilize clinical acumen, EKG, Troponin and serial changes to determine if it is an Acute Myocardial Infarction or myocardial injury due to an underlying chronic condition.     Magnesium  "[[id number]]  (Normal) Collected:  11/10/19 0935    Specimen:  Blood Updated:  11/10/19 1023     Magnesium 2.1 mg/dL     Procalcitonin [[id number]]  (Normal) Collected:  11/10/19 0935    Specimen:  Blood Updated:  11/10/19 1010     Procalcitonin 0.13 ng/mL     Narrative:       As a Marker for Sepsis (Non-Neonates):   1. <0.5 ng/mL represents a low risk of severe sepsis and/or septic shock.  1. >2 ng/mL represents a high risk of severe sepsis and/or septic shock.    As a Marker for Lower Respiratory Tract Infections that require antibiotic therapy:  PCT on Admission     Antibiotic Therapy             6-12 Hrs later  > 0.5                Strongly Recommended            >0.25 - <0.5         Recommended  0.1 - 0.25           Discouraged                   Remeasure/reassess PCT  <0.1                 Strongly Discouraged          Remeasure/reassess PCT      As 28 day mortality risk marker: \"Change in Procalcitonin Result\" (> 80 % or <=80 %) if Day 0 (or Day 1) and Day 4 values are available. Refer to http://www.Sookboxpct-calculator.com/   Change in PCT <=80 %   A decrease of PCT levels below or equal to 80 % defines a positive change in PCT test result representing a higher risk for 28-day all-cause mortality of patients diagnosed with severe sepsis or septic shock.  Change in PCT > 80 %   A decrease of PCT levels of more than 80 % defines a negative change in PCT result representing a lower risk for 28-day all-cause mortality of patients diagnosed with severe sepsis or septic shock.                CBC & Differential [[id number]] Collected:  11/10/19 0935    Specimen:  Blood Updated:  11/10/19 0953    Narrative:       The following orders were created for panel order CBC & Differential.  Procedure                               Abnormality         Status                     ---------                               -----------         ------                     CBC Auto Differential[[id number]]        Abnormal            Final " result                 Please view results for these tests on the individual orders.    CBC Auto Differential [[id number]]  (Abnormal) Collected:  11/10/19 0935    Specimen:  Blood Updated:  11/10/19 0953     WBC 8.01 10*3/mm3      RBC 3.37 10*6/mm3      Hemoglobin 10.3 g/dL      Hematocrit 33.9 %      .6 fL      MCH 30.6 pg      MCHC 30.4 g/dL      RDW 13.2 %      RDW-SD 49.1 fl      MPV 9.3 fL      Platelets 215 10*3/mm3      Neutrophil % 71.1 %      Lymphocyte % 14.0 %      Monocyte % 8.9 %      Eosinophil % 5.0 %      Basophil % 0.6 %      Immature Grans % 0.4 %      Neutrophils, Absolute 5.70 10*3/mm3      Lymphocytes, Absolute 1.12 10*3/mm3      Monocytes, Absolute 0.71 10*3/mm3      Eosinophils, Absolute 0.40 10*3/mm3      Basophils, Absolute 0.05 10*3/mm3      Immature Grans, Absolute 0.03 10*3/mm3      nRBC 0.0 /100 WBC     Green Top (No Gel) [[id number]] Collected:  11/10/19 0935    Specimen:  Blood Updated:  11/10/19 0937    Blood Gas, Arterial With Co-Ox [[id number]]  (Abnormal) Collected:  11/10/19 0934    Specimen:  Arterial Blood Updated:  11/10/19 0935     Site Right Brachial     Mynor's Test N/A     pH, Arterial 7.323 pH units      Comment: 84 Value below reference range        pCO2, Arterial 62.4 mm Hg      Comment: 83 Value above reference range        pO2, Arterial 86.5 mm Hg      HCO3, Arterial 32.3 mmol/L      Comment: 83 Value above reference range        Base Excess, Arterial 4.9 mmol/L      Comment: 83 Value above reference range        O2 Saturation, Arterial 96.2 %      Hemoglobin, Blood Gas 10.5 g/dL      Comment: 84 Value below reference range        Hematocrit, Blood Gas 32.1 %      Comment: 84 Value below reference range        Oxyhemoglobin 94.3 %      Methemoglobin 1.20 %      Carboxyhemoglobin 0.8 %      A-a Gradiant 64.8 mmHg      Barometric Pressure for Blood Gas 737 mmHg      Modality Nasal Cannula     FIO2 32 %      Flow Rate 3.0 lpm      Ventilator Mode NA     Comment:  Meter: [id number]     :  [id number]        Note --     pH, Temp Corrected --     pCO2, Temperature Corrected --     pO2, Temperature Corrected --          Radiology:    Imaging Results (Last 72 Hours)     Procedure Component Value Units Date/Time    CT Chest Without Contrast [[id number]] Collected:  11/10/19 1113     Updated:  11/10/19 1121    Narrative:       PROCEDURE: CT CHEST WO CONTRAST-, CT ABDOMEN PELVIS WO CONTRAST-     HISTORY: fall, right rib pain, shortness of breath     COMPARISON: 12/29/2017.     PROCEDURE: Axial images were obtained from the thoracic inlet through  the pubic symphysis without the use of intravenous contrast.       FINDINGS:      CHEST: There is no evidence of mediastinal or axillary adenopathy. There  are calcified mediastinal lymph nodes from prior granulomatous exposure.  Heart size is enlarged. There are no pleural or pericardial effusions.  Lung windows reveal an 8 mm nodule in the right upper lobe seen on image  24 which is unchanged. There are no focal opacities. There is no  pneumothorax. Scarring is present in the lingula. Bone windows reveal no  acute fractures. There is some respiratory motion artifact noted.     ABDOMEN: The liver is homogeneous in appearance with no focal lesions.  Stones are present within the gallbladder. The spleen is unremarkable.  No adrenal mass is present.  The pancreas is normal. The kidneys are  unremarkable. The aorta is normal in caliber. There is no free fluid or  adenopathy. The abdominal portions of the GI tract are unremarkable.     PELVIS: The appendix is normal. There are colonic diverticula without CT  evidence of diverticulitis. The urinary bladder is unremarkable. There  is no significant free fluid or adenopathy. Bone windows reveal no acute  fractures. The patient is status post total left hip arthroplasty..       Impression:       No evidence of acute process involving the chest, abdomen or  pelvis.     This study was  performed with techniques to keep radiation doses as low  as reasonably achievable (ALARA). Individualized dose reduction  techniques using automated exposure control or adjustment of mA and/or  kV according to the patient size were employed.      This report was finalized on 11/10/2019 11:19 AM by Richelle Patel M.D..    CT Abdomen Pelvis Without Contrast [[id number]] Collected:  11/10/19 1113     Updated:  11/10/19 1121    Narrative:       PROCEDURE: CT CHEST WO CONTRAST-, CT ABDOMEN PELVIS WO CONTRAST-     HISTORY: fall, right rib pain, shortness of breath     COMPARISON: 12/29/2017.     PROCEDURE: Axial images were obtained from the thoracic inlet through  the pubic symphysis without the use of intravenous contrast.       FINDINGS:      CHEST: There is no evidence of mediastinal or axillary adenopathy. There  are calcified mediastinal lymph nodes from prior granulomatous exposure.  Heart size is enlarged. There are no pleural or pericardial effusions.  Lung windows reveal an 8 mm nodule in the right upper lobe seen on image  24 which is unchanged. There are no focal opacities. There is no  pneumothorax. Scarring is present in the lingula. Bone windows reveal no  acute fractures. There is some respiratory motion artifact noted.     ABDOMEN: The liver is homogeneous in appearance with no focal lesions.  Stones are present within the gallbladder. The spleen is unremarkable.  No adrenal mass is present.  The pancreas is normal. The kidneys are  unremarkable. The aorta is normal in caliber. There is no free fluid or  adenopathy. The abdominal portions of the GI tract are unremarkable.     PELVIS: The appendix is normal. There are colonic diverticula without CT  evidence of diverticulitis. The urinary bladder is unremarkable. There  is no significant free fluid or adenopathy. Bone windows reveal no acute  fractures. The patient is status post total left hip arthroplasty..       Impression:       No evidence of  acute process involving the chest, abdomen or  pelvis.     This study was performed with techniques to keep radiation doses as low  as reasonably achievable (ALARA). Individualized dose reduction  techniques using automated exposure control or adjustment of mA and/or  kV according to the patient size were employed.      This report was finalized on 11/10/2019 11:19 AM by Richelle Patel M.D..    CT Head Without Contrast [[id number]] Collected:  11/10/19 1108     Updated:  11/10/19 1115    Narrative:       PROCEDURE: CT HEAD WO CONTRAST-, CT CERVICAL SPINE WO CONTRAST-     HISTORY: fall, on plavix     PROCEDURE: Axial images were obtained from the skull base to the  thoracic inlet by computed tomography. Multiple axial CT images were  performed from the foramen magnum to the vertex without enhancement.          C-SPINE:     FINDINGS: Motion artifact limits the exam. No definitive fracture is  evident. Multilevel degenerative changes noted throughout the cervical  spine. The prevertebral soft tissues are normal.  Limited images of the  lung apices are unremarkable.     HEAD CT:     FINDINGS: The ventricles are normal in size. There is no evidence of  hemorrhage .  No masses are identified.  No extra-axial fluid is seen.   The sinuses are normal.       Impression:       1. No acute intracranial abnormality.  2. Limited examination of the cervical spine secondary to motion  artifact. No gross fracture is evident. If clinical concern persist a  repeat exam is recommended.           1723.10 mGy.cm        This study was performed with techniques to keep radiation doses as low  as reasonably achievable (ALARA). Individualized dose reduction  techniques using automated exposure control or adjustment of mA and/or  kV according to the patient size were employed.      This report was finalized on 11/10/2019 11:13 AM by Richelle Patel M.D..    CT Cervical Spine Without Contrast [[id number]] Collected:  11/10/19 1108      Updated:  11/10/19 1115    Narrative:       PROCEDURE: CT HEAD WO CONTRAST-, CT CERVICAL SPINE WO CONTRAST-     HISTORY: fall, on plavix     PROCEDURE: Axial images were obtained from the skull base to the  thoracic inlet by computed tomography. Multiple axial CT images were  performed from the foramen magnum to the vertex without enhancement.          C-SPINE:     FINDINGS: Motion artifact limits the exam. No definitive fracture is  evident. Multilevel degenerative changes noted throughout the cervical  spine. The prevertebral soft tissues are normal.  Limited images of the  lung apices are unremarkable.     HEAD CT:     FINDINGS: The ventricles are normal in size. There is no evidence of  hemorrhage .  No masses are identified.  No extra-axial fluid is seen.   The sinuses are normal.       Impression:       1. No acute intracranial abnormality.  2. Limited examination of the cervical spine secondary to motion  artifact. No gross fracture is evident. If clinical concern persist a  repeat exam is recommended.           1723.10 mGy.cm        This study was performed with techniques to keep radiation doses as low  as reasonably achievable (ALARA). Individualized dose reduction  techniques using automated exposure control or adjustment of mA and/or  kV according to the patient size were employed.      This report was finalized on 11/10/2019 11:13 AM by Richelle Patel M.D..          Assessment:    Assessment/Plan       Acute on chronic respiratory failure with hypoxia and hypercapnia (CMS/HCC)    COPD with exacerbation (CMS/HCC)    Repeated falls    Anxiety    Atrial fibrillation (CMS/HCC)    Chronic kidney disease, stage III (moderate) (CMS/HCC)    Essential hypertension    Back pain    Impaired mobility and ADLs    Anemia    Bruise of both arms        Plan:     1.  Acute on chronic respiratory failure with hypercapnia and due to COPD exacerbation-we will start him on IV steroids bronchodilators and oxygen with  supportive care.  He will also continue his CPAP or BiPAP at the home unit settings at night and oxygen during the day however nasal cannula to keep to keep saturation above 90%    2.  Repeated falls patient has been having repeated falls and will benefit from a PT OT eval and inpatient rehab transfer as it is multiple falls when stable for discharge    3.  History of atrial fibrillation currently in sinus rhythm.  He cannot get anticoagulation due to multiple falls and has been on Plavix and aspirin and so aspirin will be stopped and only Plavix to be continued    4.  UTI he has complains of dysuria and things has UTI urine culture to be done and will cover with Rocephin till then which will also help with bronco infection though  x-rays yet to be done    Continue rest of the medication as per orders with gentle hydration and avoid medications which would cause more risk for falls and lethargy    Jermaine Perez MD  11/10/19  3:10 PM    Please note that portions of this note may have been completed with a voice recognition program. Efforts were made to edit the dictations, but occasionally words are mistranscribed.    Electronically signed by Jermaine Perez MD at 11/10/19 1517       [facility] Medications (active)       Dose Frequency Start End    ALPRAZolam (XANAX) tablet 0.5 mg 0.5 mg Nightly PRN 11/10/2019     Sig - Route: Take 1 tablet by mouth At Night As Needed for Anxiety. - Oral    amLODIPine (NORVASC) tablet 5 mg 5 mg Every 24 Hours Scheduled 11/10/2019     Sig - Route: Take 1 tablet by mouth Daily. - Oral    aspirin EC tablet 81 mg 81 mg Daily 11/10/2019     Sig - Route: Take 1 tablet by mouth Daily. - Oral    atorvastatin (LIPITOR) tablet 10 mg 10 mg Nightly 11/10/2019     Sig - Route: Take 1 tablet by mouth Every Night. - Oral    benzonatate (TESSALON) capsule 100 mg 100 mg 3 Times Daily PRN 11/12/2019     Sig - Route: Take 1 capsule by mouth 3 (Three) Times a Day As Needed for Cough. - Oral     budesonide-formoterol (SYMBICORT) 80-4.5 MCG/ACT inhaler 2 puff 2 puff 2 Times Daily - RT 11/10/2019     Sig - Route: Inhale 2 puffs 2 (Two) Times a Day. - Inhalation    carboxymethylcellulose (REFRESH PLUS) 0.5 % ophthalmic solution 1 drop 1 drop 3 Times Daily PRN 11/12/2019     Sig - Route: Administer 1 drop to both eyes 3 (Three) Times a Day As Needed for Dry Eyes. - Both Eyes    cefTRIAXone (ROCEPHIN) IVPB 1 g/50ml dextrose (premix) 1 g Every 24 Hours 11/11/2019 11/14/2019    Sig - Route: Infuse 50 mL into a venous catheter Daily. - Intravenous    cyclobenzaprine (FLEXERIL) tablet 5 mg 5 mg Every 8 Hours Scheduled 11/12/2019     Sig - Route: Take 0.5 tablets by mouth Every 8 (Eight) Hours. - Oral    escitalopram (LEXAPRO) tablet 10 mg 10 mg Daily 11/10/2019     Sig - Route: Take 1 tablet by mouth Daily. - Oral    finasteride (PROSCAR) tablet 5 mg 5 mg Daily 11/10/2019     Sig - Route: Take 1 tablet by mouth Daily. - Oral    furosemide (LASIX) tablet 20 mg 20 mg Daily 11/10/2019     Sig - Route: Take 1 tablet by mouth Daily. - Oral    HYDROcodone-acetaminophen (NORCO) 7.5-325 MG per tablet 1 tablet 1 tablet Every 6 Hours PRN 11/12/2019 11/22/2019    Sig - Route: Take 1 tablet by mouth Every 6 (Six) Hours As Needed for Moderate Pain . - Oral    ipratropium-albuterol (DUO-NEB) nebulizer solution 3 mL 3 mL Every 6 Hours - RT 11/10/2019     Sig - Route: Take 3 mL by nebulization Every 6 (Six) Hours. - Nebulization    lidocaine (LIDODERM) 5 % 1 patch 1 patch Every 24 Hours Scheduled 11/11/2019     Sig - Route: Place 1 patch on the skin as directed by provider Daily. - Transdermal    lisinopril (PRINIVIL,ZESTRIL) tablet 5 mg 5 mg Daily 11/10/2019     Sig - Route: Take 1 tablet by mouth Daily. - Oral    methylPREDNISolone sodium succinate (SOLU-Medrol) injection 60 mg 60 mg Every 12 Hours 11/10/2019     Sig - Route: Infuse 0.96 mL into a venous catheter Every 12 (Twelve) Hours. - Intravenous    metoprolol tartrate  (LOPRESSOR) tablet 25 mg 25 mg 2 Times Daily 11/10/2019     Sig - Route: Take 1 tablet by mouth 2 (Two) Times a Day. - Oral    pantoprazole (PROTONIX) EC tablet 40 mg 40 mg Every Early Morning 11/10/2019     Sig - Route: Take 1 tablet by mouth Every Morning. - Oral    predniSONE (DELTASONE) tablet 10 mg 10 mg Daily With Breakfast 11/11/2019     Sig - Route: Take 1 tablet by mouth Daily With Breakfast. - Oral    QUEtiapine (SEROquel) tablet 12.5 mg 12.5 mg 3 Times Daily PRN 11/10/2019     Sig - Route: Take 0.5 tablets by mouth 3 (Three) Times a Day As Needed (anxiety/agitation). - Oral    sodium chloride 0.9 % flush 10 mL 10 mL As Needed 11/10/2019     Sig - Route: Infuse 10 mL into a venous catheter As Needed for Line Care. - Intravenous    Cosign for Ordering: Accepted by Jacquie Lawson MD on 11/10/2019 10:12 AM    tamsulosin (FLOMAX) 24 hr capsule 0.4 mg 0.4 mg Nightly 11/10/2019     Sig - Route: Take 1 capsule by mouth Every Night. - Oral    traMADol (ULTRAM) tablet 50 mg 50 mg Every 4 Hours PRN 11/10/2019 11/20/2019    Sig - Route: Take 1 tablet by mouth Every 4 (Four) Hours As Needed for Moderate Pain  or Severe Pain . - Oral    traZODone (DESYREL) tablet 50 mg 50 mg Nightly 11/10/2019     Sig - Route: Take 1 tablet by mouth Every Night. - Oral             Physician Progress Notes (last 24 hours) (Notes from 11/12/19 1142 through 11/13/19 1142)      Ottoniel Hill MD at 11/12/19 1255          Hospitalist Progress Note.    LOS: 2 days    Patient Care Team:  Miguel Rojas MD as PCP - General  Miguel Rojas MD as PCP - Family Medicine  Ulysses Bass MD as Cardiologist (Cardiology)    Chief Complaint:    Follow-up on hypercarbic respiratory failure,     SUBJECTIVE:  He feels some better today with the exception of continued pain in his right lower back.  He also has a cough that is productive but this is a chronic issue for him.  He has not had any fever or chills.  Feels that his legs are slightly  swollen. Wants something stronger for the pain in his back.    Hospital course:  Patient is a 69-year-old male with medical history of COPD on home oxygen, sleep apnea on CPAP, A. fib not on anticoagulation due to multiple falls, chronic kidney disease stage III, hypertension, chronic back pain was brought into the ER after sustaining a fall at home.  He also reported shortness of breath, generalized weakness and back pain.  He has only apparently been home for a week after returning from short-term rehab in [place].  So far he has already fallen twice at home resulting in multiple bruises in his bilateral upper extremities.  He reported falling asleep the night before presentation and ending up on the floor where he remained until he was found by 1 of the home health nurses.  In the ER, he had normal vitals and labs showed elevated CO2 level at 63 with a pH of 7.32.  He reported not being able to wear his BiPAP due to being on the floor for the whole night.  His urinalysis was suggestive of possible UTI so the patient was placed on BiPAP, given steroids and antibiotics and admitted to the hospitalist service.    His blood gases showed significant improvement with nightly use of BiPAP.  Patient does have a cough which is a chronic issue but continues to complain of pain in his right lower back.  His CT scan did not reveal any hydronephrosis/nephrolithiasis.  CT chest did not reveal any pneumonia/fluid.  X-ray of his ribs did not reveal any fracture.  He did fall on the right when he sustained his fall at home and may have some muscle pain related to this.    Nursing noted that the patient had some difficulty with swallowing cardiology was even the dysphasia diet that is ordered for him currently.  I did review his EGD report that was done in [place] on 10/30/2019.  The gastroenterologist suspected he may have eosinophilic esophagitis and his distal esophagus was dilated empirically even though there was no  "stricture found.  I called for the results of the biopsy yesterday but they were not yet available.    Review of Systems:    The pertinent  ROS was done and it is noted above, rest  was negative.    OBJECTIVE:    Vital Signs  /59 (BP Location: Right arm, Patient Position: Lying)   Pulse 80   Temp 98.2 °F (36.8 °C) (Oral)   Resp 18   Ht 172.7 cm (68\")   Wt 80.6 kg (177 lb 11.1 oz)   SpO2 97%   BMI 27.02 kg/m²        I/O this shift:  In: 480 [P.O.:480]  Out: 300 [Urine:200; Emesis/NG output:100]    Intake/Output Summary (Last 24 hours) at 11/12/2019 1312  Last data filed at 11/12/2019 1155  Gross per 24 hour   Intake 960 ml   Output 1450 ml   Net -490 ml       Physical Exam:    General Appearance: alert, oriented x 3, no acute distress, very pleasant  HEENT: pupils round and reactive to light, oral mucosa dry, extraocular movements intact.  Neck: supple, no JVD, trachea midline  Lungs: Clear to Auscultation, unlabored breathing effort  Heart: RRR, normal S1 and S2, no S3, no rub  Abdomen: soft, non-tender, no palpable bladder, present bowel sounds to auscultation  Extremities: trace edema, cyanosis or clubbing.   Neuro: normal speech and mental status, grossly non focal.     Results Review:    Results from last 7 days   Lab Units 11/12/19  0642 11/11/19  0525 11/10/19  0935  11/06/19  1241   SODIUM mmol/L 142 145 149*   < > 148*   POTASSIUM mmol/L 4.8 4.5 4.3   < > 4.7   CHLORIDE mmol/L 101 103 105   < > 103   TOTAL CO2 mmol/L  --   --   --   --  30.8*   CO2 mmol/L 28.0 29.3* 31.9*   < >  --    BUN mg/dL 60* 57* 60*   < > 45*   CREATININE mg/dL 2.05* 2.17* 2.77*   < > 2.28*   CALCIUM mg/dL 9.3 9.2 9.9   < > 9.9   BILIRUBIN mg/dL  --  0.4 0.5  --  0.5   ALK PHOS U/L  --  55 74  --  64   ALT (SGPT) U/L  --  13 15  --  11   AST (SGOT) U/L  --  16 21  --  15   GLUCOSE mg/dL 123* 146* 113*   < >  --     < > = values in this interval not displayed.       Estimated Creatinine Clearance: 38.8 mL/min (A) (by C-G " formula based on SCr of 2.05 mg/dL (H)).    Results from last 7 days   Lab Units 11/12/19  0642 11/10/19  0935   MAGNESIUM mg/dL  --  2.1   PHOSPHORUS mg/dL 3.8  --              Results from last 7 days   Lab Units 11/12/19  0642 11/11/19  0525 11/10/19  0935 11/08/19  1304 11/06/19  1241   WBC 10*3/mm3 10.64 7.45 8.01 8.86 9.01   HEMOGLOBIN g/dL 9.6* 9.6* 10.3* 10.2* 11.4*   PLATELETS 10*3/mm3 198 197 215 232 239               Imaging Results (Last 24 Hours)     ** No results found for the last 24 hours. **          amLODIPine 5 mg Oral Q24H   aspirin 81 mg Oral Daily   atorvastatin 10 mg Oral Nightly   budesonide-formoterol 2 puff Inhalation BID - RT   cefTRIAXone 1 g Intravenous Q24H   cyclobenzaprine 5 mg Oral Q8H   escitalopram 10 mg Oral Daily   finasteride 5 mg Oral Daily   furosemide 20 mg Oral Daily   ipratropium-albuterol 3 mL Nebulization Q6H - RT   lidocaine 1 patch Transdermal Q24H   lisinopril 5 mg Oral Daily   methylPREDNISolone sodium succinate 60 mg Intravenous Q12H   metoprolol tartrate 25 mg Oral BID   pantoprazole 40 mg Oral Q AM   predniSONE 10 mg Oral Daily With Breakfast   tamsulosin 0.4 mg Oral Nightly   traZODone 50 mg Oral Nightly          Medication Review:   Current Facility-Administered Medications   Medication Dose Route Frequency Provider Last Rate Last Dose   • ALPRAZolam (XANAX) tablet 0.5 mg  0.5 mg Oral Nightly PRN Jermaine Perez MD   0.5 mg at 11/10/19 2332   • amLODIPine (NORVASC) tablet 5 mg  5 mg Oral Q24H Jermaine Perez MD   5 mg at 11/12/19 0943   • aspirin EC tablet 81 mg  81 mg Oral Daily Jermaine Perez MD   81 mg at 11/12/19 1005   • atorvastatin (LIPITOR) tablet 10 mg  10 mg Oral Nightly Jermaine Perez MD   10 mg at 11/11/19 2015   • benzonatate (TESSALON) capsule 100 mg  100 mg Oral TID PRN Ottoniel Hill MD       • budesonide-formoterol (SYMBICORT) 80-4.5 MCG/ACT inhaler 2 puff  2 puff Inhalation BID - RT Jermaine Perez MD   2 puff at 11/12/19 0641   •  carboxymethylcellulose (REFRESH PLUS) 0.5 % ophthalmic solution 1 drop  1 drop Both Eyes TID PRN Ottoniel Hill MD       • cefTRIAXone (ROCEPHIN) IVPB 1 g/50ml dextrose (premix)  1 g Intravenous Q24H Jermaine Perez  mL/hr at 11/12/19 1148 1 g at 11/12/19 1148   • cyclobenzaprine (FLEXERIL) tablet 5 mg  5 mg Oral Q8H Ottoniel Hill MD       • escitalopram (LEXAPRO) tablet 10 mg  10 mg Oral Daily Jermaine Perez MD   10 mg at 11/12/19 0943   • finasteride (PROSCAR) tablet 5 mg  5 mg Oral Daily Jermaine Perez MD   5 mg at 11/12/19 0943   • furosemide (LASIX) tablet 20 mg  20 mg Oral Daily Jermaine Perez MD   20 mg at 11/12/19 0943   • HYDROcodone-acetaminophen (NORCO) 7.5-325 MG per tablet 1 tablet  1 tablet Oral Q6H PRN Ottoniel Hill MD       • ipratropium-albuterol (DUO-NEB) nebulizer solution 3 mL  3 mL Nebulization Q6H - RT Jermaine Perez MD   3 mL at 11/12/19 1251   • lidocaine (LIDODERM) 5 % 1 patch  1 patch Transdermal Q24H Ottoniel Hill MD   1 patch at 11/12/19 0945   • lisinopril (PRINIVIL,ZESTRIL) tablet 5 mg  5 mg Oral Daily Jermaine Perez MD   5 mg at 11/12/19 0945   • methylPREDNISolone sodium succinate (SOLU-Medrol) injection 60 mg  60 mg Intravenous Q12H Jermaine Perez MD   60 mg at 11/12/19 0944   • metoprolol tartrate (LOPRESSOR) tablet 25 mg  25 mg Oral BID Jermaine Perez MD   25 mg at 11/12/19 0943   • pantoprazole (PROTONIX) EC tablet 40 mg  40 mg Oral Q AM Jermaine Perez MD   40 mg at 11/12/19 0621   • predniSONE (DELTASONE) tablet 10 mg  10 mg Oral Daily With Breakfast Jermaine Perez MD   10 mg at 11/12/19 0943   • QUEtiapine (SEROquel) tablet 12.5 mg  12.5 mg Oral TID PRN Jermaine Perez MD       • sodium chloride 0.9 % flush 10 mL  10 mL Intravenous PRN Emergency, Triage Protocol, MD   10 mL at 11/11/19 2016   • tamsulosin (FLOMAX) 24 hr capsule 0.4 mg  0.4 mg Oral Nightly Jermaine Perez MD   0.4 mg at 11/11/19 2015   • traMADol (ULTRAM) tablet 50 mg  50  mg Oral Q4H PRN Kaylee Michael,    50 mg at 11/11/19 2015   • traZODone (DESYREL) tablet 50 mg  50 mg Oral Nightly Jermaine Perez MD   50 mg at 11/11/19 2015       ASSESSMENT/PLAN:    Acute on chronic respiratory failure with hypoxia and hypercapnia (CMS/HCC)    Anxiety    Atrial fibrillation (CMS/HCC)    Chronic kidney disease, stage III (moderate) (CMS/HCC)    Essential hypertension    Back pain    COPD with exacerbation (CMS/HCC)    Impaired mobility and ADLs    Anemia    Repeated falls    Bruise of both arms    Mr. Pena is currently hemodynamically stable.  His renal function seems to be improving slowly.  He continues to complain of right lower back pain.  Suspect that this may be secondary to muscle contusion related to his fall.  Will increase his narcotic dose as he has tolerated this well.  Flexeril will be scheduled instead of as needed basis.  Continue with lidocaine patch.  PT and OT are following.    He does have mild pedal edema of his bilateral lower extremities.  He takes Lasix 20 mg daily which is being continued.  He also takes Norvasc 5 mg daily which can be potentially contributing to the lower extremity edema.  As his blood pressure has been fairly well controlled and even on the low side I will go ahead and disc continue Norvasc for the time being.  Advised the patient to keep his legs elevated as much as possible.    As mentioned above, his most recent EGD was suggestive of possible eosinophilic esophagitis.  Biopsy results are not yet available.  He did have empiric dilation of his distal esophagus but he did not have any findings consistent with esophageal stricture.  Per patient, there has not been any improvement despite having his esophagus dilated.  Do not feel that a repeat dilation would be of any use at this time if he was not found to have any stricture just 2 days ago.     Patient's urine culture grew out Corynebacterium mentioned before.  Continue with ceftriaxone to  complete a 3-day course.    Start Tessalon Perles for persistent cough which is likely related to his advanced lung disease/COPD.    Patient is currently medically ready, he can be discharged when he has a bed available at short-term rehab.    Details were discussed with the patient.  I also discussed the details with the nursing staff.  Rest as ordered.    Ottoniel Hill MD  19  1:12 PM    Please note that portions of this note may have been completed with a voice recognition program. Efforts were made to edit the dictations, but occasionally words are mistranscribed.    Electronically signed by Ottoniel Hill MD at 19 1312          Physical Therapy Notes (last 24 hours) (Notes from 19 1142 through 19 1142)      Kaylee Kurtz, PT at 19  Version 1 of          Problem: Patient Care Overview  Goal: Plan of Care Review  Outcome: Ongoing (interventions implemented as appropriate)   19   Coping/Psychosocial   Plan of Care Reviewed With patient   Plan of Care Review   Progress improving   OTHER   Outcome Summary Patient demonstrates improving strength and balance as seen by increased gait distance and quality. He is expected to benefit from additional PT services.            Electronically signed by Kaylee Kurtz, PT at 19     Kaylee Kurtz, PT at 19  Version 1 of 1         Patient Name: Jani Pena  : 1950    MRN: [id number]                              Today's Date: 2019       Admit Date: 11/10/2019    Visit Dx:     ICD-10-CM ICD-9-CM   1. Acute on chronic respiratory failure with hypoxia and hypercapnia (CMS/HCC) J96.21 518.84    J96.22 786.09     799.02   2. Impaired mobility and ADLs Z74.09 799.89     Patient Active Problem List   Diagnosis   • Anxiety   • Atrial fibrillation (CMS/HCC)   • Chronic kidney disease, stage III (moderate) (CMS/HCC)   • Steroid-dependent COPD (CMS/HCC)   • Degeneration of cervical intervertebral disc    • GERD without esophagitis   • Diverticulosis   • Hemorrhoids   • Hiatal hernia   • Hyperlipidemia   • Essential hypertension   • Kyphosis, acquired   • PEDRO on CPAP   • Benign prostatic hyperplasia with incomplete bladder emptying   • History of arthritis   • Back pain   • Depression   • AQUINO (dyspnea on exertion)   • History of ventricular septal defect   • COPD with exacerbation (CMS/HCC)   • Impaired mobility and ADLs   • Chronic diastolic congestive heart failure (CMS/HCC)   • Coronary artery disease involving native coronary artery of native heart with angina pectoris (CMS/HCC)   • Lumbar radiculopathy   • Dysphagia   • Anemia   • Cardiac pacemaker in situ   • Sick sinus syndrome (CMS/HCC)   • Polypharmacy   • Chronic respiratory failure with hypoxia and hypercapnia (CMS/HCC)   • Hematoma   • Acute on chronic respiratory failure with hypoxia and hypercapnia (CMS/HCC)   • Repeated falls   • Bruise of both arms     Past Medical History:   Diagnosis Date   • Abnormal liver enzymes    • Anemia    • Anxiety    • Arthritis    • Atherosclerotic heart disease of native coronary artery without angina pectoris    • Atrial fibrillation (CMS/HCC)    • Back pain    • BPH (benign prostatic hyperplasia)    • Cataract, bilateral    • Chest pain     2-3 MONTHS AGO.  PER PT, HAS ADDRESSED WITH CARDIOLOGIST.  STATES HAS NTG PRN.   • CHF (congestive heart failure) (CMS/HCC)    • Chronic bronchitis (CMS/HCC)    • Chronic kidney disease    • COPD (chronic obstructive pulmonary disease) (CMS/HCC)    • Degeneration of cervical intervertebral disc    • Depression    • Diverticulosis    • Dyspnea    • Esophageal reflux    • Esophagitis    • Gastritis    • Hematuria    • Hemorrhoids    • Hiatal hernia    • History of arthritis    • History of nuclear stress test     UNSURE OF DATE   • History of peripheral neuropathy    • History of ventricular septal defect    • History of ventricular septal defect    • Hyperlipidemia    • Hypertension     • Impaired functional mobility, balance, gait, and endurance    • Infectious diarrhea    • Kyphosis, acquired    • Lumbar radiculopathy    • Mitral and aortic valve disease    • Nephrolithiasis    • Phimosis    • Problems with swallowing     WITH FOOD   • Respiratory failure (CMS/HCC)    • Sleep apnea     BIPAP   • TIA (transient ischemic attack)     X3.   2014   • Ventral hernia    • Wears glasses     FOR READING     Past Surgical History:   Procedure Laterality Date   • CARDIAC CATHETERIZATION     • CARDIAC CATHETERIZATION N/A 5/24/2018    Procedure: Left Heart Cath;  Surgeon: Edouard Robertson MD;  Location:  [facility] CATH INVASIVE LOCATION;  Service: Cardiovascular   • CARDIAC ELECTROPHYSIOLOGY PROCEDURE N/A 1/31/2019    Procedure: PACEMAKER IMPLANTATION- DC;  Surgeon: Omar Encinas DO;  Location:  [facility] EP INVASIVE LOCATION;  Service: Cardiology   • ENDOSCOPY N/A 1/26/2019    Procedure: ESOPHAGOGASTRODUODENOSCOPY;  Surgeon: Brunner, Mark I, MD;  Location:  [facility] ENDOSCOPY;  Service: Gastroenterology   • HERNIA REPAIR     • ORTHOPEDIC SURGERY Left     Left hip arthroplasty   • PACEMAKER IMPLANTATION  01/31/2019   • SUPRAPUBIC CATHETER INSERTION      History of Percutaneous Catheter Placement Into Ureter   • THROAT SURGERY      FOR SLEEP APNEA   • VSD REPAIR      History of VSD Repair By Patch Closure     General Information     Row Name 11/12/19 1700          PT Evaluation Time/Intention    Document Type  therapy note (daily note)  -JR     Mode of Treatment  physical therapy  -     Row Name 11/12/19 1700          General Information    Existing Precautions/Restrictions  oxygen therapy device and L/min;fall  -JR       User Key  (r) = Recorded By, (t) = Taken By, (c) = Cosigned By    Initials Name Provider Type    JR Kaylee Kurtz, PT Physical Therapist        Mobility     Row Name 11/12/19 8250          Bed Mobility Assessment/Treatment    Bed Mobility Assessment/Treatment  supine-sit;sit-supine  -JR      Supine-Sit [place] (Bed Mobility)  contact guard  -JR     Sit-Supine [place] (Bed Mobility)  contact guard  -JR     Assistive Device (Bed Mobility)  head of bed elevated;bed rails  -     Row Name 11/12/19 1700          Sit-Stand Transfer    Sit-Stand [place] (Transfers)  minimum assist (75% patient effort)  -JR     Assistive Device (Sit-Stand Transfers)  walker, front-wheeled  -JR     Row Name 11/12/19 1700          Gait/Stairs Assessment/Training    Gait/Stairs Assessment/Training  gait/ambulation assistive device  -JR     [place] Level (Gait)  minimum assist (75% patient effort)  -JR     Assistive Device (Gait)  walker, front-wheeled  -JR     Distance in Feet (Gait)  42 x 2  -JR     Pattern (Gait)  step-through  -JR     Deviations/Abnormal Patterns (Gait)  base of support, narrow;pati decreased;festinating/shuffling;gait speed decreased;stride length decreased  -JR     Bilateral Gait Deviations  forward flexed posture  -JR       User Key  (r) = Recorded By, (t) = Taken By, (c) = Cosigned By    Initials Name Provider Type    JR Kaylee Kurtz, PT Physical Therapist        Obj/Interventions     Row Name 11/12/19 1700          Static Sitting Balance    Level of [place] (Unsupported Sitting, Static Balance)  conditional independence  -     Sitting Position (Unsupported Sitting, Static Balance)  sitting on edge of bed  [place] Name 11/12/19 1700          Dynamic Sitting Balance    Level of [place], Reaches Outside Midline (Sitting, Dynamic Balance)  supervision  -     Sitting Position, Reaches Outside Midline (Sitting, Dynamic Balance)  sitting on edge of bed  [place] Name 11/12/19 1700          Static Standing Balance    Level of [place] (Supported Standing, Static Balance)  contact guard assist  -JR     Assistive Device Utilized (Supported Standing, Static Balance)  walker, rolling  -     Row Name 11/12/19 1700          Dynamic Standing Balance    Level of  [place], Reaches Outside Midline (Standing, Dynamic Balance)  minimal assist, 75% patient effort  -JR     Assistive Device Utilized (Supported Standing, Dynamic Balance)  walker, rolling  -       User Key  (r) = Recorded By, (t) = Taken By, (c) = Cosigned By    Initials Name Provider Type    Kaylee Lemos, PT Physical Therapist        Goals/Plan    No documentation.       Clinical Impression     Row Name 11/12/19 1700          Pain Assessment    Additional Documentation  Pain Scale: Numbers Pre/Post-Treatment (Group)  -JR     Row Name 11/12/19 1700          Pain Scale: Numbers Pre/Post-Treatment    Pain Scale: Numbers, Pretreatment  4/10  -JR     Pain Scale: Numbers, Post-Treatment  4/10  -JR     Pain Location - Side  Right  -JR     Pain Location - Orientation  posterior  -JR     Pain Location  flank  -JR     Pain Intervention(s)  Repositioned;Ambulation/increased activity  -JR     Row Name 11/12/19 1700          Plan of Care Review    Plan of Care Reviewed With  patient  -JR     Row Name 11/12/19 1700          Positioning and Restraints    Pre-Treatment Position  in bed  -JR     Post Treatment Position  bed  -JR     In Bed  supine;call light within reach;encouraged to call for assist  -JR       User Key  (r) = Recorded By, (t) = Taken By, (c) = Cosigned By    Initials Name Provider Type    Kaylee Lemos, PT Physical Therapist        Outcome Measures     Row Name 11/12/19 5895          How much help from another person do you currently need...    Turning from your back to your side while in flat bed without using bedrails?  3  -JR     Moving from lying on back to sitting on the side of a flat bed without bedrails?  3  -JR     Moving to and from a bed to a chair (including a wheelchair)?  3  -JR     Standing up from a chair using your arms (e.g., wheelchair, bedside chair)?  3  -JR     Climbing 3-5 steps with a railing?  2  -JR     To walk in hospital room?  2  -JR     AM-PAC 6 Clicks Score (PT)  16  -JR      Row Name 11/12/19 1824          Functional Assessment    Outcome Measure Options  AM-PAC 6 Clicks Basic Mobility (PT)  -       User Key  (r) = Recorded By, (t) = Taken By, (c) = Cosigned By    Initials Name Provider Type    Kaylee Lemos, PT Physical Therapist        Physical Therapy Education     Title: PT OT SLP Therapies (Not Started)     Topic: Physical Therapy (In Progress)     Point: Mobility training (Done)     Learning Progress Summary           Patient Acceptance, E,TB, VU by  at 11/12/2019  6:27 PM    Comment:  importance of mobility to recovery    Acceptance, E,TB, VU by  at 11/11/2019  3:29 PM    Comment:  Pt instructed on gait training and transfers with RW.   Family Acceptance, E,TB, VU by MR at 11/11/2019  3:29 PM    Comment:  Pt instructed on gait training and transfers with RW.                   Point: Body mechanics (Done)     Learning Progress Summary           Patient Acceptance, E,TB, VU by MR at 11/11/2019  3:29 PM    Comment:  Pt instructed on gait training and transfers with RW.   Family Acceptance, E,TB, VU by MR at 11/11/2019  3:29 PM    Comment:  Pt instructed on gait training and transfers with RW.                   Point: Precautions (Done)     Learning Progress Summary           Patient Acceptance, E,TB, VU by MR at 11/11/2019  3:29 PM    Comment:  Pt instructed on gait training and transfers with RW.   Family Acceptance, E,TB, VU by MR at 11/11/2019  3:29 PM    Comment:  Pt instructed on gait training and transfers with RW.                               User Key     Initials Effective Dates Name Provider Type [facility] 04/03/18 -  Kaylee Kurtz, PT Physical Therapist PT    MR 11/06/19 -  Hermelinda Donovan, WILFRED Student PT Student PT              PT Recommendation and Plan     Plan of Care Reviewed With: patient  Progress: improving  Outcome Summary: Patient demonstrates improving strength and balance as seen by increased gait distance and quality.  He is expected to benefit  from additional PT services.       Time Calculation:   PT Charges     Row Name 19 1831             Time Calculation    Start Time  1700  -JR      Stop Time  1724  -JR      Time Calculation (min)  24 min  -JR        User Key  (r) = Recorded By, (t) = Taken By, (c) = Cosigned By    Initials Name Provider Type    Kaylee Lemos, PT Physical Therapist        Therapy Charges for Today     Code Description Service Date Service Provider Modifiers Qty    42800075608 HC PT THERAPEUTIC ACT EA 15 MIN 2019 Kaylee Kurtz, PT GP 1    98130038197 HC GAIT TRAINING EA 15 MIN 2019 Kaylee Kurtz, PT GP 1          PT G-Codes  Outcome Measure Options: AM-PAC 6 Clicks Basic Mobility (PT)  AM-PAC 6 Clicks Score (PT): 16  AM-PAC 6 Clicks Score (OT): 20    Kaylee Kurtz PT  2019         Electronically signed by Kaylee Kurtz PT at 19 1833          Occupational Therapy Notes (last 24 hours) (Notes from 19 1142 through 19 1142)      Akilah Gutierrez at 19 1614          Acute Care - Occupational Therapy Treatment Note  CARLOS Shane     Patient Name: Jani Pena  : 1950  MRN: [id number]  Today's Date: 2019  Onset of Illness/Injury or Date of Surgery: 11/10/19  Date of Referral to OT: 11/10/19  Referring Physician: Dr. Perez    Admit Date: 11/10/2019       ICD-10-CM ICD-9-CM   1. Acute on chronic respiratory failure with hypoxia and hypercapnia (CMS/HCC) J96.21 518.84    J96.22 786.09     799.02   2. Impaired mobility and ADLs Z74.09 799.89     Patient Active Problem List   Diagnosis   • Anxiety   • Atrial fibrillation (CMS/HCC)   • Chronic kidney disease, stage III (moderate) (CMS/HCC)   • Steroid-dependent COPD (CMS/HCC)   • Degeneration of cervical intervertebral disc   • GERD without esophagitis   • Diverticulosis   • Hemorrhoids   • Hiatal hernia   • Hyperlipidemia   • Essential hypertension   • Kyphosis, acquired   • PEDRO on CPAP   • Benign prostatic hyperplasia with incomplete  bladder emptying   • History of arthritis   • Back pain   • Depression   • AQUINO (dyspnea on exertion)   • History of ventricular septal defect   • COPD with exacerbation (CMS/HCC)   • Impaired mobility and ADLs   • Chronic diastolic congestive heart failure (CMS/HCC)   • Coronary artery disease involving native coronary artery of native heart with angina pectoris (CMS/HCC)   • Lumbar radiculopathy   • Dysphagia   • Anemia   • Cardiac pacemaker in situ   • Sick sinus syndrome (CMS/HCC)   • Polypharmacy   • Chronic respiratory failure with hypoxia and hypercapnia (CMS/HCC)   • Hematoma   • Acute on chronic respiratory failure with hypoxia and hypercapnia (CMS/HCC)   • Repeated falls   • Bruise of both arms     Past Medical History:   Diagnosis Date   • Abnormal liver enzymes    • Anemia    • Anxiety    • Arthritis    • Atherosclerotic heart disease of native coronary artery without angina pectoris    • Atrial fibrillation (CMS/HCC)    • Back pain    • BPH (benign prostatic hyperplasia)    • Cataract, bilateral    • Chest pain     2-3 MONTHS AGO.  PER PT, HAS ADDRESSED WITH CARDIOLOGIST.  STATES HAS NTG PRN.   • CHF (congestive heart failure) (CMS/HCC)    • Chronic bronchitis (CMS/HCC)    • Chronic kidney disease    • COPD (chronic obstructive pulmonary disease) (CMS/HCC)    • Degeneration of cervical intervertebral disc    • Depression    • Diverticulosis    • Dyspnea    • Esophageal reflux    • Esophagitis    • Gastritis    • Hematuria    • Hemorrhoids    • Hiatal hernia    • History of arthritis    • History of nuclear stress test     UNSURE OF DATE   • History of peripheral neuropathy    • History of ventricular septal defect    • History of ventricular septal defect    • Hyperlipidemia    • Hypertension    • Impaired functional mobility, balance, gait, and endurance    • Infectious diarrhea    • Kyphosis, acquired    • Lumbar radiculopathy    • Mitral and aortic valve disease    • Nephrolithiasis    • Phimosis    •  Problems with swallowing     WITH FOOD   • Respiratory failure (CMS/HCC)    • Sleep apnea     BIPAP   • TIA (transient ischemic attack)     X3.   2014   • Ventral hernia    • Wears glasses     FOR READING     Past Surgical History:   Procedure Laterality Date   • CARDIAC CATHETERIZATION     • CARDIAC CATHETERIZATION N/A 5/24/2018    Procedure: Left Heart Cath;  Surgeon: Edouard Robertson MD;  Location:  [facility];  Service: Cardiovascular   • CARDIAC ELECTROPHYSIOLOGY PROCEDURE N/A 1/31/2019    Procedure: PACEMAKER IMPLANTATION- DC;  Surgeon: Omar Encinas DO;  Location:  [facility];  Service: Cardiology   • ENDOSCOPY N/A 1/26/2019    Procedure: ESOPHAGOGASTRODUODENOSCOPY;  Surgeon: Brunner, Mark I, MD;  Location:  [facility];  Service: Gastroenterology   • HERNIA REPAIR     • ORTHOPEDIC SURGERY Left     Left hip arthroplasty   • PACEMAKER IMPLANTATION  01/31/2019   • SUPRAPUBIC CATHETER INSERTION      History of Percutaneous Catheter Placement Into Ureter   • THROAT SURGERY      FOR SLEEP APNEA   • VSD REPAIR      History of VSD Repair By Patch Closure       Therapy Treatment    Rehabilitation Treatment Summary     Row Name 11/12/19 1444             Treatment Time/Intention    Discipline  occupational therapist  -      Document Type  therapy note (daily note)  -      Subjective Information  complains of;weakness  -      Mode of Treatment  occupational therapy  -      Patient/Family Observations  Pt received supine in bed on O2  -      Care Plan Review  care plan/treatment goals reviewed;patient/other agree to care plan  -      Recorded by [] Akilah Gutierrez 11/12/19 1604      Row Name 11/12/19 1444             Vital Signs    O2 Delivery Pre Treatment  supplemental O2  -      O2 Delivery Intra Treatment  supplemental O2  -      O2 Delivery Post Treatment  supplemental O2  -      Recorded by [] Akilah Gutierrez 11/12/19 1604      Row Name 11/12/19 1444              Bed Mobility Assessment/Treatment    Bed Mobility Assessment/Treatment  supine-sit;sit-supine;scooting/bridging  -      Scooting/Bridging [place] (Bed Mobility)  contact guard  -      Supine-Sit [place] (Bed Mobility)  contact guard  -AH      Sit-Supine [place] (Bed Mobility)  contact guard  -      Assistive Device (Bed Mobility)  head of bed elevated  -AH      Recorded by [] Akilah Gutierrez 11/12/19 1604      Row Name 11/12/19 1444             Transfer Assessment/Treatment    Transfer Assessment/Treatment  --  -AH      Recorded by [] Akilah Gutierrez 11/12/19 1604      Row Name 11/12/19 1444             Sit-Stand Transfer    Sit-Stand [place] (Transfers)  --  -AH      Recorded by [] Akilah Gutierrez 11/12/19 1604      Row Name 11/12/19 1444             Stand-Sit Transfer    Stand-Sit [place] (Transfers)  --  -AH      Recorded by [] Akilah Gutierrez 11/12/19 1604      Row Name 11/12/19 1444             Positioning and Restraints    Pre-Treatment Position  in bed  -AH      Post Treatment Position  bed  -AH      In Bed  supine;call light within reach;encouraged to call for assist  -AH      Recorded by [] Akilah Gutierrez 11/12/19 1604      Row Name 11/12/19 1444             Pain Assessment    Additional Documentation  Pain Scale: Numbers Pre/Post-Treatment (Group)  -      Recorded by [] Akilah Gutierrez 11/12/19 1604      Row Name 11/12/19 1444             Pain Scale: Numbers Pre/Post-Treatment    Pain Scale: Numbers, Pretreatment  4/10  -      Pain Scale: Numbers, Post-Treatment  4/10  -      Pain Location - Side  Right  -AH      Pain Location  flank  -      Pain Intervention(s)  Repositioned;Ambulation/increased activity  -AH      Recorded by [] Akilah Gutierrez 11/12/19 1604      Row Name                Wound 11/10/19 1413 Left anterior;upper arm Skin Tear    Wound - Properties Group Date first assessed: 11/10/19 [CB] Time first assessed: 1413 [CB] Side: Left [CB]  Orientation: anterior;upper [CB] Location: arm [CB] Primary Wound Type: Skin tear [CB] Recorded by:  [CB] Mone Uribe RN 11/10/19 1413    Row Name                Wound 11/10/19 1414 Right parietal region Abrasion    Wound - Properties Group Date first assessed: 11/10/19 [CB] Time first assessed: 1414 [CB] Side: Right [CB] Location: parietal region [CB] Primary Wound Type: Abrasion [CB] Recorded by:  [CB] Mone Uribe RN 11/10/19 1414    Row Name                Wound 11/11/19 0600 Right anterior;distal;upper arm Skin Tear    Wound - Properties Group Date first assessed: 11/11/19 [RE] Time first assessed: 0600 [RE] Side: Right [RE] Orientation: anterior;distal;upper [RE] Location: arm [RE] Primary Wound Type: Skin tear [RE] Recorded by:  [RE] Agustina Lee RN 11/11/19 0801    Row Name 11/12/19 1444             Coping    Observed Emotional State  accepting;cooperative  -      Verbalized Emotional State  acceptance  -AH      Recorded by [] Akilah Gutierrez 11/12/19 1604      Row Name 11/12/19 1444             Plan of Care Review    Plan of Care Reviewed With  patient  -      Recorded by [Akilah Graham 11/12/19 1604      Row Name 11/12/19 1444             Outcome Summary/Treatment Plan (OT)    Daily Summary of Progress (OT)  progress toward functional goals as expected  -      Anticipated Discharge Disposition (OT)  inpatient rehabilitation facility  -      Recorded by [Akilah Kim 11/12/19 1604        User Key  (r) = Recorded By, (t) = Taken By, (c) = Cosigned By    Initials Name Effective Dates Discipline    Akilah Del Angel 03/07/18 -  OT    CB Mone Uribe RN 10/26/16 -  Nurse    RE Agustina Lee RN 10/26/16 -  Nurse        Wound 11/10/19 1413 Left anterior;upper arm Skin Tear (Active)   Dressing Appearance dry;intact 11/12/2019  8:00 AM   Closure SELENE 11/12/2019  8:00 AM   Base pink 11/11/2019  8:00 PM   Periwound ecchymotic 11/11/2019  8:00 PM   Drainage Amount scant  11/11/2019  8:00 PM   Care, Wound cleansed with;sterile normal saline 11/11/2019  8:00 PM   Dressing Care, Wound dressing changed;non-adherent;gauze 11/11/2019  8:00 PM       Wound 11/10/19 1414 Right parietal region Abrasion (Active)   Closure Open to air 11/12/2019  8:00 AM   Base scab 11/12/2019  8:00 AM   Drainage Amount none 11/12/2019  8:00 AM       Wound 11/11/19 0600 Right anterior;distal;upper arm Skin Tear (Active)   Dressing Appearance dry;intact 11/12/2019  8:00 AM   Base pink 11/11/2019  8:00 PM   Periwound ecchymotic 11/12/2019  8:00 AM   Periwound Temperature warm 11/12/2019  8:00 AM   Periwound Skin Turgor soft 11/12/2019  8:00 AM   Drainage Amount none 11/12/2019  8:00 AM   Care, Wound cleansed with;sterile normal saline 11/11/2019  8:00 PM   Dressing Care, Wound dressing changed;non-adherent;gauze 11/11/2019  8:00 PM       Occupational Therapy Education     Title: PT OT SLP Therapies (Not Started)     Topic: Occupational Therapy (In Progress)     Point: ADL training (Done)     Description: Instruct learner(s) on proper safety adaptation and remediation techniques during self care or transfers.   Instruct in proper use of assistive devices.    Learning Progress Summary           Patient Acceptance, E,TB, VU by  at 11/12/2019  4:05 PM    Comment:  Pt educated on UB ex, proper positioning of finger splints, benefit of increased activity    Acceptance, E,TB, VU by  at 11/11/2019  3:48 PM    Comment:  Role of OT/POC   Family Acceptance, E,TB, VU by  at 11/11/2019  3:48 PM    Comment:  Role of OT/POC                   Point: Home exercise program (Done)     Description: Instruct learner(s) on appropriate technique for monitoring, assisting and/or progressing therapeutic exercises/activities.    Learning Progress Summary           Patient Acceptance, E,TB, VU by  at 11/12/2019  4:05 PM    Comment:  Pt educated on UB ex, proper positioning of finger splints, benefit of increased activity                                User Key     Initials Effective Dates Name Provider Type [facility] 03/07/18 -  Akilah Gutierrez Occupational Therapist OT                OT Recommendation and Plan  Outcome Summary/Treatment Plan (OT)  Daily Summary of Progress (OT): progress toward functional goals as expected  Anticipated Discharge Disposition (OT): inpatient rehabilitation facility  Therapy Frequency (OT Eval): 3 times/wk  Daily Summary of Progress (OT): progress toward functional goals as expected  Plan of Care Review  Plan of Care Reviewed With: patient  Plan of Care Reviewed With: patient  Outcome Summary: Pt sat eob 15 min, performed 2x10 UB ex, pt cga supine to sit, sit to supine.  Pt able to scoot up to head of bed without assistance.  Outcome Measures     Row Name 11/12/19 1444 11/11/19 1436          How much help from another is currently needed...    Putting on and taking off regular lower body clothing?  3  -AH  3  -AH     Bathing (including washing, rinsing, and drying)  3  -AH  3  -AH     Toileting (which includes using toilet bed pan or urinal)  3  -  3  -AH     Putting on and taking off regular upper body clothing  3  -  3  -AH     Taking care of personal grooming (such as brushing teeth)  4  -  4  -AH     Eating meals  4  -AH  4  -AH     AM-PAC 6 Clicks Score (OT)  20  -  20  -        Functional Assessment    Outcome Measure Options  AM-PAC 6 Clicks Daily Activity (OT)  -  AM-PAC 6 Clicks Daily Activity (OT)  -       User Key  (r) = Recorded By, (t) = Taken By, (c) = Cosigned By    Initials Name Provider Type     Akilah Gutierrez Occupational Therapist           Time Calculation:   Time Calculation- OT     Row Name 11/12/19 1612             Time Calculation- OT    OT Start Time  1444  -      OT Stop Time  1520  -      OT Time Calculation (min)  36 min  -      OT Received On  11/12/19  -      OT Goal Re-Cert Due Date  11/21/19  -         Timed Charges    97212 - OT Therapeutic  Exercise Minutes  15  -      14749 - OT Therapeutic Activity Minutes  21  -        User Key  (r) = Recorded By, (t) = Taken By, (c) = Cosigned By    Initials Name Provider Type    Akilah Del Angel Occupational Therapist        Therapy Charges for Today     Code Description Service Date Service Provider Modifiers Qty    07196059757 HC OT EVAL LOW COMPLEXITY 3 11/11/2019 Akilah Gutierrez 1    62846285544 HC OT THER PROC EA 15 MIN 11/12/2019 Akilah Gutierrez 1    70352523647 HC OT THERAPEUTIC ACT EA 15 MIN 11/12/2019 Akilah Gutierrez GO 1               Akilah Gutierrez  11/12/2019    Electronically signed by Akilah Gutierrez at 11/12/19 1614     Akilah Gutierrez at 11/12/19 1611          Problem: Patient Care Overview  Goal: Plan of Care Review  Outcome: Ongoing (interventions implemented as appropriate)   11/12/19 1606   Coping/Psychosocial   Plan of Care Reviewed With patient   Plan of Care Review   Progress improving   OTHER   Outcome Summary Pt sat eob 15 min, performed 2x10 UB ex, pt cga supine to sit, sit to supine. Pt able to scoot up to head of bed without assistance.           Electronically signed by Akilah Gutierrez at 11/12/19 1619

## 2019-11-13 NOTE — PLAN OF CARE
Problem: Patient Care Overview  Goal: Plan of Care Review  Outcome: Ongoing (interventions implemented as appropriate)   11/13/19 1323   Coping/Psychosocial   Plan of Care Reviewed With patient   Plan of Care Review   Progress improving   OTHER   Outcome Summary Pt increased ambulation to 48' x 2 with RW and min A. He demonstrates deficits in balance, coordination, strength, and mobility. Pt is expected to benefit from continued PT services prior to D/C.

## 2019-11-14 VITALS
OXYGEN SATURATION: 94 % | HEART RATE: 88 BPM | SYSTOLIC BLOOD PRESSURE: 135 MMHG | TEMPERATURE: 98.6 F | DIASTOLIC BLOOD PRESSURE: 66 MMHG | WEIGHT: 178 LBS | BODY MASS INDEX: 26.98 KG/M2 | RESPIRATION RATE: 19 BRPM | HEIGHT: 68 IN

## 2019-11-14 PROCEDURE — 94799 UNLISTED PULMONARY SVC/PX: CPT

## 2019-11-14 PROCEDURE — 63710000001 PREDNISONE PER 5 MG: Performed by: INTERNAL MEDICINE

## 2019-11-14 PROCEDURE — 25010000002 METHYLPREDNISOLONE PER 125 MG: Performed by: INTERNAL MEDICINE

## 2019-11-14 PROCEDURE — 99238 HOSP IP/OBS DSCHRG MGMT 30/<: CPT | Performed by: INTERNAL MEDICINE

## 2019-11-14 RX ORDER — CYCLOBENZAPRINE HCL 5 MG
5 TABLET ORAL EVERY 8 HOURS SCHEDULED
Start: 2019-11-14 | End: 2020-01-08 | Stop reason: SDUPTHER

## 2019-11-14 RX ORDER — CARBOXYMETHYLCELLULOSE SODIUM 5 MG/ML
1 SOLUTION/ DROPS OPHTHALMIC 3 TIMES DAILY PRN
Qty: 1 BOTTLE | Refills: 0
Start: 2019-11-14 | End: 2020-01-01

## 2019-11-14 RX ORDER — HYDROCODONE BITARTRATE AND ACETAMINOPHEN 7.5; 325 MG/1; MG/1
1 TABLET ORAL EVERY 6 HOURS PRN
Qty: 12 TABLET | Refills: 0 | Status: SHIPPED | OUTPATIENT
Start: 2019-11-14 | End: 2019-11-17

## 2019-11-14 RX ORDER — PSEUDOEPHEDRINE HCL 30 MG
100 TABLET ORAL 2 TIMES DAILY
Start: 2019-11-14 | End: 2020-01-08 | Stop reason: SDUPTHER

## 2019-11-14 RX ORDER — LIDOCAINE 50 MG/G
1 PATCH TOPICAL
Start: 2019-11-15 | End: 2020-01-29 | Stop reason: SDUPTHER

## 2019-11-14 RX ORDER — POLYETHYLENE GLYCOL 3350 17 G/17G
17 POWDER, FOR SOLUTION ORAL DAILY PRN
Qty: 7 EACH
Start: 2019-11-14 | End: 2019-12-28 | Stop reason: HOSPADM

## 2019-11-14 RX ORDER — CLONAZEPAM 0.5 MG/1
0.5 TABLET, ORALLY DISINTEGRATING ORAL NIGHTLY PRN
Qty: 3 TABLET | Refills: 0 | Status: SHIPPED | OUTPATIENT
Start: 2019-11-14 | End: 2019-11-17

## 2019-11-14 RX ORDER — BENZONATATE 100 MG/1
100 CAPSULE ORAL 3 TIMES DAILY PRN
Qty: 20 CAPSULE | Refills: 0
Start: 2019-11-14 | End: 2019-11-19

## 2019-11-14 RX ORDER — QUETIAPINE FUMARATE 25 MG/1
12.5 TABLET, FILM COATED ORAL 3 TIMES DAILY PRN
Qty: 3 TABLET | Refills: 0 | Status: SHIPPED | OUTPATIENT
Start: 2019-11-14 | End: 2019-11-17

## 2019-11-14 RX ADMIN — LISINOPRIL 5 MG: 5 TABLET ORAL at 09:24

## 2019-11-14 RX ADMIN — CYCLOBENZAPRINE HYDROCHLORIDE 5 MG: 10 TABLET, FILM COATED ORAL at 14:56

## 2019-11-14 RX ADMIN — ASPIRIN 81 MG: 81 TABLET, COATED ORAL at 09:24

## 2019-11-14 RX ADMIN — LIDOCAINE 1 PATCH: 50 PATCH CUTANEOUS at 09:23

## 2019-11-14 RX ADMIN — METOPROLOL TARTRATE 25 MG: 25 TABLET ORAL at 09:24

## 2019-11-14 RX ADMIN — AMLODIPINE BESYLATE 5 MG: 5 TABLET ORAL at 09:23

## 2019-11-14 RX ADMIN — IPRATROPIUM BROMIDE AND ALBUTEROL SULFATE 3 ML: .5; 3 SOLUTION RESPIRATORY (INHALATION) at 07:01

## 2019-11-14 RX ADMIN — BISACODYL 10 MG: 10 SUPPOSITORY RECTAL at 06:19

## 2019-11-14 RX ADMIN — BUDESONIDE AND FORMOTEROL FUMARATE DIHYDRATE 2 PUFF: 80; 4.5 AEROSOL RESPIRATORY (INHALATION) at 07:01

## 2019-11-14 RX ADMIN — FUROSEMIDE 20 MG: 20 TABLET ORAL at 09:23

## 2019-11-14 RX ADMIN — SODIUM CHLORIDE, PRESERVATIVE FREE 10 ML: 5 INJECTION INTRAVENOUS at 09:24

## 2019-11-14 RX ADMIN — ESCITALOPRAM OXALATE 10 MG: 10 TABLET ORAL at 09:24

## 2019-11-14 RX ADMIN — PANTOPRAZOLE SODIUM 40 MG: 40 TABLET, DELAYED RELEASE ORAL at 06:12

## 2019-11-14 RX ADMIN — CYCLOBENZAPRINE HYDROCHLORIDE 5 MG: 10 TABLET, FILM COATED ORAL at 06:11

## 2019-11-14 RX ADMIN — IPRATROPIUM BROMIDE AND ALBUTEROL SULFATE 3 ML: .5; 3 SOLUTION RESPIRATORY (INHALATION) at 01:00

## 2019-11-14 RX ADMIN — FINASTERIDE 5 MG: 5 TABLET, FILM COATED ORAL at 09:23

## 2019-11-14 RX ADMIN — DOCUSATE SODIUM 100 MG: 100 CAPSULE, LIQUID FILLED ORAL at 09:23

## 2019-11-14 RX ADMIN — METHYLPREDNISOLONE SODIUM SUCCINATE 60 MG: 125 INJECTION, POWDER, FOR SOLUTION INTRAMUSCULAR; INTRAVENOUS at 09:24

## 2019-11-14 RX ADMIN — PREDNISONE 10 MG: 10 TABLET ORAL at 09:24

## 2019-11-14 RX ADMIN — QUETIAPINE FUMARATE 12.5 MG: 25 TABLET ORAL at 09:33

## 2019-11-14 NOTE — PLAN OF CARE
Problem: Patient Care Overview  Goal: Plan of Care Review  Outcome: Ongoing (interventions implemented as appropriate)   11/14/19 5983   Coping/Psychosocial   Plan of Care Reviewed With patient   Plan of Care Review   Progress improving   OTHER   Outcome Summary PT ALERT, NO C/O PAIN, COMPLIANT WITH HOME BIPAP, AMBULATES WIT ONE ASSIST.

## 2019-11-14 NOTE — PROGRESS NOTES
Continued Stay Note   [facility]     Patient Name: Jani Pena  MRN: [id number]  Today's Date: 11/14/2019    Admit Date: 11/10/2019    Discharge Plan     Row Name 11/14/19 1416       Plan    Plan  Angela    Patient/Family in Agreement with Plan  yes    Plan Comments Spoke to Janice with [facility].  Pt has been approved and can accept today.      Pt updated.  He reports that his brother will provide transportation, but cannot be here until 4:30 to 5:00 pm.      Reports number is [phone], [place].  Janice has printed discharge summary from [facility].  VERNON [place] updated.        Discharge Codes    No documentation.       Expected Discharge Date and Time     Expected Discharge Date Expected Discharge Time    Nov 14, 2019             aDnielle Lundy RN

## 2019-11-14 NOTE — SIGNIFICANT NOTE
11/14/19 1350   Rehab Treatment   Discipline occupational therapist   Reason Treatment Not Performed patient/family declined treatment  (states he is being transferred to rehab today)

## 2019-11-14 NOTE — DISCHARGE SUMMARY
[facility]   DISCHARGE SUMMARY      Name:  Jani Pena   Age:  69 y.o.  Sex:  male  :  1950  MRN:  [id number]   Visit Number:  [id number]    Admission Date:  11/10/2019  Date of Discharge:  2019  Primary Care Physician:  Miguel Rojas MD    Important issues to note:  1. Call your gastroenterologist to obtain results of your biopsy from EGD   2. Continue with flutter valve and incentive spirometer daily to prevent atelectasis    Discharge Diagnoses:       Problem List:       Acute on chronic respiratory failure with hypoxia and hypercapnia (CMS/HCC)    Anxiety    Atrial fibrillation (CMS/HCC)    Chronic kidney disease, stage III (moderate) (CMS/HCC)    Essential hypertension    Back pain    COPD with exacerbation (CMS/HCC)    Impaired mobility and ADLs    Anemia    Repeated falls    Bruise of both arms      Presenting Problem:    Acute on chronic respiratory failure with hypoxia and hypercapnia (CMS/HCC) [J96.21, J96.22]  Acute on chronic respiratory failure with hypoxia and hypercapnia (CMS/HCC) [J96.21, J96.22]     Consults:     Consults     No orders found from 10/12/2019 to 2019.        Consulting Physician(s)             None            Procedures Performed:           History of presenting illness/Hospital Course:  Patient is a 69-year-old male with medical history of COPD on home oxygen, sleep apnea on CPAP, A. fib not on anticoagulation due to multiple falls, chronic kidney disease stage III, hypertension, chronic back pain was brought into the ER after sustaining a fall at home.  He also reported shortness of breath, generalized weakness and back pain.  He has only apparently been home for a week after returning from short-term rehab in [place].  So far he has already fallen twice at home resulting in multiple bruises in his bilateral upper and lower extremities.  He reported falling asleep the night before presentation and ending up on the floor  where he remained until he was found by 1 of the home health nurses.  In the ER, he had normal vitals and labs showed elevated CO2 level at 63 with a pH of 7.32.  He reported not being able to wear his BiPAP due to being on the floor for the whole night.  His urinalysis was suggestive of possible UTI so the patient was placed on BiPAP, given steroids and antibiotics and admitted to the hospitalist service.     His blood gases showed significant improvement with nightly use of BiPAP and IV steroids. Patient does have a cough which is a chronic issue but continues to complain of pain in his right lower back.  His CT scan did not reveal any hydronephrosis/nephrolithiasis. CT chest did not reveal any pneumonia/fluid.  X-ray of his ribs did not reveal any fracture.  He did fall on the right when he sustained his fall at home and may have some muscle pain related to this.  While the patient reports his cough is chronic a repeat chest x-ray was done on 11/13/2019 due to persistent productive cough.  This showed hypo-ventilation with possible atelectasis versus infiltrate.  Her procalcitonin level was negative and therefore he was not restarted on antibiotics.  He did complete a 3-day course of ceftriaxone for corynebacterium UTI.     Nursing noted that the patient had some difficulty with swallowing, even on the dysphagia diet that is ordered for him currently.  I did review his EGD report that was done in [place] on 10/30/2019.  The gastroenterologist suspected he may have eosinophilic esophagitis and his distal esophagus was dilated empirically even though there was no stricture found.  I called for the results of the biopsy but they were not yet available.  Patient advised to call the gastroenterologist office again early next week to obtain the results of his biopsy.    Patient was seen and evaluated by PT and OT.  He demonstrated significant debility and he was recommended to go to short-term rehab.  Patient agreed  and has been approved for [facility].  They have seen and evaluated him today and has accepted him for transfer there at this afternoon.     Patient seen and examined this morning.  He feels well and has no new or acute complaints. He does have a persistent cough for which he has not yet been given any Tussionex.  I told him to take this twice a day as needed for short duration of time.  He requested that I update his primary care physician, Dr. Rojas on his current medical status and his transfer to [facility] which I did.  Patient is being discharged there this afternoon in stable condition.  Has been encouraged to look into assisted living facility as I do not feel that the patient can continue to live independently at home    Vital Signs:    Temp:  [98.2 °F (36.8 °C)-98.6 °F (37 °C)] 98.6 °F (37 °C)  Heart Rate:  [80-88] 88  Resp:  [17-20] 19  BP: (126-169)/(54-82) 135/66    Physical Exam:    General Appearance:  Alert and cooperative, not in any acute distress.   Head:  Atraumatic and normocephalic, without obvious abnormality.   Eyes:          PERRLA, conjunctivae and sclerae normal, no Icterus. No pallor. Extraocular movements are within normal limits.   Ears:  Ears appear intact with no abnormalities noted.   Throat: No oral lesions, no thrush, oral mucosa moist.   Neck: Supple, trachea midline, no thyromegaly, no carotid bruit.   Back:   No kyphoscoliosis present. No tenderness to palpation,   range of motion normal.   Lungs:   Chest shape is normal. Breath sounds heard bilaterally equally.  No crackles or wheezing. No Pleural rub or bronchial breathing.   Heart:  Normal S1 and S2, no murmur, no gallop, no rub. No JVD.   Abdomen:   Normal bowel sounds, no masses, no organomegaly. Soft, non-tender, non-distended, no guarding, no rebound tenderness.   Extremities: Moves all extremities well, no edema, no cyanosis, no clubbing.   Pulses: Pulses palpable and equal bilaterally.   Skin:  Multiple large bruises and  ecchymosis in bilateral upper and lower extremities   Lymph nodes: No palpable adenopathy.   Neurologic: Alert and oriented x 3. Moves all four limbs equally. No tremors. No facial asymetry.     Pertinent Lab Results:     Results from last 7 days   Lab Units 11/13/19  0636 11/12/19  0642 11/11/19  0525 11/10/19  0935   SODIUM mmol/L 145 142 145 149*   POTASSIUM mmol/L 4.8 4.8 4.5 4.3   CHLORIDE mmol/L 102 101 103 105   CO2 mmol/L 29.9* 28.0 29.3* 31.9*   BUN mg/dL 58* 60* 57* 60*   CREATININE mg/dL 2.01* 2.05* 2.17* 2.77*   CALCIUM mg/dL 9.5 9.3 9.2 9.9   BILIRUBIN mg/dL  --   --  0.4 0.5   ALK PHOS U/L  --   --  55 74   ALT (SGPT) U/L  --   --  13 15   AST (SGOT) U/L  --   --  16 21   GLUCOSE mg/dL 123* 123* 146* 113*     Results from last 7 days   Lab Units 11/13/19  0636 11/12/19  0642 11/11/19  0525   WBC 10*3/mm3 10.93* 10.64 7.45   HEMOGLOBIN g/dL 10.2* 9.6* 9.6*   HEMATOCRIT % 33.0* 31.4* 31.8*   PLATELETS 10*3/mm3 203 198 197         Results from last 7 days   Lab Units 11/10/19  0935   CK TOTAL U/L 231*   TROPONIN T ng/mL 0.033*                 Results from last 7 days   Lab Units 11/12/19  0731   PH, ARTERIAL pH units 7.389   PO2 ART mm Hg 72.6*   PCO2, ARTERIAL mm Hg 54.5*   HCO3 ART mmol/L 32.9*     Results from last 7 days   Lab Units 11/10/19  1037   COLOR UA  Yellow   GLUCOSE UA  Negative   KETONES UA  Negative   LEUKOCYTES UA  Small (1+)*   PH, URINE  <=5.0   BILIRUBIN UA  Negative   UROBILINOGEN UA  0.2 E.U./dL     Pain Management Panel     Pain Management Panel Latest Ref Rng & Units 9/1/2019 8/29/2019    CREATININE UR mg/dL 90.1 -    AMPHETAMINES SCREEN, URINE Negative - Negative    BARBITURATES SCREEN Negative - Negative    BENZODIAZEPINE SCREEN, URINE Negative - Positive(A)    BUPRENORPHINEUR Negative - Negative    COCAINE SCREEN, URINE Negative - Negative    METHADONE SCREEN, URINE Negative - Negative    METHAMPHETAMINEUR Negative - Negative        Results from last 7 days   Lab Units  11/10/19  1037 11/10/19  1027 11/10/19  1012   BLOODCX   --  No growth at 4 days No growth at 4 days   URINECX  50,000 CFU/mL Corynebacterium species*  --   --        Pertinent Radiology Results:    Imaging Results (All)     Procedure Component Value Units Date/Time    XR Chest 1 View [[id number]] Collected:  11/13/19 1343     Updated:  11/13/19 1353    Narrative:       PROCEDURE: XR CHEST 1 VW-     HISTORY: persistent cough; J96.21-Acute and chronic respiratory failure  with hypoxia; J96.22-Acute and chronic respiratory failure with  hypercapnia; Z74.09-Other reduced mobility     COMPARISON: 11/11/2019.     FINDINGS: A left subclavian pacemaker is in place. A vagal nerve  stimulator is present in the right chest wall. The heart is normal in  size. The mediastinum is unremarkable. There are low lung volumes with  development of bibasilar opacities which may reflect atelectasis or  pneumonia. There is no pneumothorax.  There are no acute osseous  abnormalities.       Impression:       Low lung volumes with development of bibasilar opacities  which may reflect atelectasis or pneumonia.     Continued followup is recommended.     This report was finalized on 11/13/2019 1:47 PM by Richelle Patel M.D..    XR Ribs Right With PA Chest [[id number]] Collected:  11/11/19 1131     Updated:  11/11/19 1136    Narrative:       PROCEDURE: XR RIBS RIGHT W PA CHEST-     HISTORY: lower back rib pain, worse with coughing, recent fall;  J96.21-Acute and chronic respiratory failure with hypoxia; J96.22-Acute  and chronic respiratory failure with hypercapnia     FINDINGS:     CHEST: Single view of the chest demonstrates a normal heart size and  mediastinum. Note is made of an PFO closure device in place. The lungs  are clear with no focal opacities were effusions. There is no  pneumothorax. A left subclavian pacemaker is in place and there is a  right sided carotid body stimulator.     RIBS: 3 views of the ribs demonstrate no displaced  rib fracture.       Impression:       No acute process.        This report was finalized on 11/11/2019 11:33 AM by Richelle Patel M.D..    CT Chest Without Contrast [[id number]] Collected:  11/10/19 1113     Updated:  11/10/19 1121    Narrative:       PROCEDURE: CT CHEST WO CONTRAST-, CT ABDOMEN PELVIS WO CONTRAST-     HISTORY: fall, right rib pain, shortness of breath     COMPARISON: 12/29/2017.     PROCEDURE: Axial images were obtained from the thoracic inlet through  the pubic symphysis without the use of intravenous contrast.       FINDINGS:      CHEST: There is no evidence of mediastinal or axillary adenopathy. There  are calcified mediastinal lymph nodes from prior granulomatous exposure.  Heart size is enlarged. There are no pleural or pericardial effusions.  Lung windows reveal an 8 mm nodule in the right upper lobe seen on image  24 which is unchanged. There are no focal opacities. There is no  pneumothorax. Scarring is present in the lingula. Bone windows reveal no  acute fractures. There is some respiratory motion artifact noted.     ABDOMEN: The liver is homogeneous in appearance with no focal lesions.  Stones are present within the gallbladder. The spleen is unremarkable.  No adrenal mass is present.  The pancreas is normal. The kidneys are  unremarkable. The aorta is normal in caliber. There is no free fluid or  adenopathy. The abdominal portions of the GI tract are unremarkable.     PELVIS: The appendix is normal. There are colonic diverticula without CT  evidence of diverticulitis. The urinary bladder is unremarkable. There  is no significant free fluid or adenopathy. Bone windows reveal no acute  fractures. The patient is status post total left hip arthroplasty..       Impression:       No evidence of acute process involving the chest, abdomen or  pelvis.     This study was performed with techniques to keep radiation doses as low  as reasonably achievable (ALARA). Individualized dose  reduction  techniques using automated exposure control or adjustment of mA and/or  kV according to the patient size were employed.      This report was finalized on 11/10/2019 11:19 AM by Richelle Patel M.D..    CT Abdomen Pelvis Without Contrast [[id number]] Collected:  11/10/19 1113     Updated:  11/10/19 1121    Narrative:       PROCEDURE: CT CHEST WO CONTRAST-, CT ABDOMEN PELVIS WO CONTRAST-     HISTORY: fall, right rib pain, shortness of breath     COMPARISON: 12/29/2017.     PROCEDURE: Axial images were obtained from the thoracic inlet through  the pubic symphysis without the use of intravenous contrast.       FINDINGS:      CHEST: There is no evidence of mediastinal or axillary adenopathy. There  are calcified mediastinal lymph nodes from prior granulomatous exposure.  Heart size is enlarged. There are no pleural or pericardial effusions.  Lung windows reveal an 8 mm nodule in the right upper lobe seen on image  24 which is unchanged. There are no focal opacities. There is no  pneumothorax. Scarring is present in the lingula. Bone windows reveal no  acute fractures. There is some respiratory motion artifact noted.     ABDOMEN: The liver is homogeneous in appearance with no focal lesions.  Stones are present within the gallbladder. The spleen is unremarkable.  No adrenal mass is present.  The pancreas is normal. The kidneys are  unremarkable. The aorta is normal in caliber. There is no free fluid or  adenopathy. The abdominal portions of the GI tract are unremarkable.     PELVIS: The appendix is normal. There are colonic diverticula without CT  evidence of diverticulitis. The urinary bladder is unremarkable. There  is no significant free fluid or adenopathy. Bone windows reveal no acute  fractures. The patient is status post total left hip arthroplasty..       Impression:       No evidence of acute process involving the chest, abdomen or  pelvis.     This study was performed with techniques to keep  radiation doses as low  as reasonably achievable (ALARA). Individualized dose reduction  techniques using automated exposure control or adjustment of mA and/or  kV according to the patient size were employed.      This report was finalized on 11/10/2019 11:19 AM by Richelle Patel M.D..    CT Head Without Contrast [[id number]] Collected:  11/10/19 1108     Updated:  11/10/19 1115    Narrative:       PROCEDURE: CT HEAD WO CONTRAST-, CT CERVICAL SPINE WO CONTRAST-     HISTORY: fall, on plavix     PROCEDURE: Axial images were obtained from the skull base to the  thoracic inlet by computed tomography. Multiple axial CT images were  performed from the foramen magnum to the vertex without enhancement.          C-SPINE:     FINDINGS: Motion artifact limits the exam. No definitive fracture is  evident. Multilevel degenerative changes noted throughout the cervical  spine. The prevertebral soft tissues are normal.  Limited images of the  lung apices are unremarkable.     HEAD CT:     FINDINGS: The ventricles are normal in size. There is no evidence of  hemorrhage .  No masses are identified.  No extra-axial fluid is seen.   The sinuses are normal.       Impression:       1. No acute intracranial abnormality.  2. Limited examination of the cervical spine secondary to motion  artifact. No gross fracture is evident. If clinical concern persist a  repeat exam is recommended.           1723.10 mGy.cm        This study was performed with techniques to keep radiation doses as low  as reasonably achievable (ALARA). Individualized dose reduction  techniques using automated exposure control or adjustment of mA and/or  kV according to the patient size were employed.      This report was finalized on 11/10/2019 11:13 AM by Richelle Patel M.D..    CT Cervical Spine Without Contrast [[id number]] Collected:  11/10/19 1108     Updated:  11/10/19 1115    Narrative:       PROCEDURE: CT HEAD WO CONTRAST-, CT CERVICAL SPINE WO CONTRAST-      HISTORY: fall, on plavix     PROCEDURE: Axial images were obtained from the skull base to the  thoracic inlet by computed tomography. Multiple axial CT images were  performed from the foramen magnum to the vertex without enhancement.          C-SPINE:     FINDINGS: Motion artifact limits the exam. No definitive fracture is  evident. Multilevel degenerative changes noted throughout the cervical  spine. The prevertebral soft tissues are normal.  Limited images of the  lung apices are unremarkable.     HEAD CT:     FINDINGS: The ventricles are normal in size. There is no evidence of  hemorrhage .  No masses are identified.  No extra-axial fluid is seen.   The sinuses are normal.       Impression:       1. No acute intracranial abnormality.  2. Limited examination of the cervical spine secondary to motion  artifact. No gross fracture is evident. If clinical concern persist a  repeat exam is recommended.           1723.10 mGy.cm        This study was performed with techniques to keep radiation doses as low  as reasonably achievable (ALARA). Individualized dose reduction  techniques using automated exposure control or adjustment of mA and/or  kV according to the patient size were employed.      This report was finalized on 11/10/2019 11:13 AM by Richelle Patel M.D..          Condition on Discharge:      Stable.    Code status during the hospital stay:    Code Status and Medical Interventions:   Ordered at: 11/10/19 1508     Code Status:    CPR     Medical Interventions (Level of Support Prior to Arrest):    Full       Discharge Disposition:    Home or Self Care    Discharge Medications:       Discharge Medications      New Medications      Instructions Start Date   benzonatate 100 MG capsule  Commonly known as:  TESSALON   100 mg, Oral, 3 Times Daily PRN      carboxymethylcellulose 0.5 % solution  Commonly known as:  REFRESH PLUS   1 drop, Both Eyes, 3 Times Daily PRN      cyclobenzaprine 5 MG tablet  Commonly  known as:  FLEXERIL   5 mg, Oral, Every 8 Hours Scheduled      docusate sodium 100 MG capsule   100 mg, Oral, 2 Times Daily      HYDROcod Polst-CPM Polst ER 10-8 MG/5ML ER suspension  Commonly known as:  TUSSIONEX PENNKINETIC   2.5 mL, Oral, Every 12 Hours PRN      HYDROcodone-acetaminophen 7.5-325 MG per tablet  Commonly known as:  NORCO   1 tablet, Oral, Every 6 Hours PRN      lidocaine 5 %  Commonly known as:  LIDODERM   1 patch, Transdermal, Every 24 Hours Scheduled, Remove & Discard patch within 12 hours or as directed by MD   Start Date:  11/15/2019     polyethylene glycol pack packet  Commonly known as:  MIRALAX   17 g, Oral, Daily PRN         Continue These Medications      Instructions Start Date   alfuzosin 10 MG 24 hr tablet  Commonly known as:  UROXATRAL   10 mg, Oral, Daily      ALPRAZolam 0.5 MG tablet  Commonly known as:  XANAX   TAKE ONE TABLET BY MOUTH ONCE NIGHTLY AS NEEDED FOR ANXIETY      amLODIPine 5 MG tablet  Commonly known as:  NORVASC   5 mg, Oral, Every 24 Hours Scheduled      ASPIRIN ADULT LOW STRENGTH 81 MG EC tablet  Generic drug:  aspirin   TAKE ONE TABLET BY MOUTH DAILY      atorvastatin 10 MG tablet  Commonly known as:  LIPITOR   10 mg, Oral, Nightly      clopidogrel 75 MG tablet  Commonly known as:  PLAVIX   75 mg, Oral, Daily      escitalopram 20 MG tablet  Commonly known as:  LEXAPRO   20 mg, Oral, Daily      finasteride 5 MG tablet  Commonly known as:  PROSCAR   5 mg, Oral, Daily      Fluticasone Furoate-Vilanterol 200-25 MCG/INH inhaler  Commonly known as:  BREO ELLIPTA   1 puff, Inhalation, Daily - RT      furosemide 20 MG tablet  Commonly known as:  LASIX   20 mg, Oral, Daily      ipratropium-albuterol 0.5-2.5 mg/3 ml nebulizer  Commonly known as:  DUO-NEB   3 mL, Nebulization, Every 6 Hours - RT      lisinopril 5 MG tablet  Commonly known as:  PRINIVIL,ZESTRIL   5 mg, Oral, Daily      melatonin 5 MG tablet tablet   ONE BY MOUTH EVERY NIGHT AT BEDTIME      metoprolol tartrate  25 MG tablet  Commonly known as:  LOPRESSOR   TAKE ONE TABLET BY MOUTH TWICE A DAY      O2  Commonly known as:  OXYGEN   4 L/min, Inhalation, Once, Walking on 4L and sitting 3L      pantoprazole 40 MG EC tablet  Commonly known as:  PROTONIX   TAKE ONE TABLET BY MOUTH DAILY      predniSONE 10 MG tablet  Commonly known as:  DELTASONE   10 mg, Oral, Daily With Breakfast      QUEtiapine 25 MG tablet  Commonly known as:  SEROquel   12.5 mg, Oral, 3 Times Daily PRN      traZODone 50 MG tablet  Commonly known as:  DESYREL   50 mg, Oral, Nightly             Discharge Diet:     Diet Instructions     Diet: Cardiac      Discharge Diet:  Cardiac          Activity at Discharge:     Activity Instructions     Activity as Tolerated            Follow-up Appointments:    Additional Instructions for the Follow-ups that You Need to Schedule     Discharge Follow-up with PCP   As directed       Currently Documented PCP:    Miguel Rojas MD    PCP Phone Number:    [phone]     Follow Up Details:  1 week           Follow-up Information     Miguel Rojas MD .    Specialty:  Internal Medicine  Why:  1 week  Contact information:  [address] [place] [address]  [phone]                   Future Appointments   Date Time Provider Department Center   11/19/2019  9:30 AM Miguel Rojas MD [facility] None       Additional Instructions for the Follow-ups that You Need to Schedule     Discharge Follow-up with PCP   As directed       Currently Documented PCP:    Miguel Rojas MD    PCP Phone Number:    [phone]     Follow Up Details:  1 week               Test Results Pending at Discharge:     Order Current Status    Green Top (No Gel) In process    [place] Draw In process    Blood Culture - Blood, Hand, Left Preliminary result    Blood Culture - Blood, Hand, Right Preliminary result             Ottoniel Hill MD  11/14/19  2:13 PM    Time spent: 25 minutes    Dictated utilizing Dragon dictation.

## 2019-11-14 NOTE — PROGRESS NOTES
Case Management Discharge Note         Destination - Selection Complete      Service Provider Request Status Selected Services Address Phone Number Fax Number    [facility] Selected Skilled Nursing [address], [place] [address] [phone]      Durable Medical Equipment      No service has been selected for the patient.      Dialysis/Infusion      No service has been selected for the patient.      Home Medical Care      No service has been selected for the patient.      Therapy      No service has been selected for the patient.      Community Resources      No service has been selected for the patient.        Transportation Services  Private: Car    Final Discharge Disposition Code: 03 - skilled nursing facility (SNF)

## 2019-11-15 ENCOUNTER — READMISSION MANAGEMENT (OUTPATIENT)
Dept: CALL CENTER | Facility: HOSPITAL | Age: 69
End: 2019-11-15

## 2019-11-15 ENCOUNTER — TELEPHONE (OUTPATIENT)
Dept: INTERNAL MEDICINE | Facility: CLINIC | Age: 69
End: 2019-11-15

## 2019-11-15 LAB
BACTERIA SPEC AEROBE CULT: NORMAL
BACTERIA SPEC AEROBE CULT: NORMAL

## 2019-11-15 NOTE — OUTREACH NOTE
Prep Survey      Responses   Facility patient discharged from?  [facility]   Is patient eligible?  No   What are the reasons patient is not eligible?  [facility]   Does the patient have one of the following disease processes/diagnoses(primary or secondary)?  COPD/Pneumonia   Prep survey completed?  Yes          Tomasa Suh RN

## 2019-11-15 NOTE — TELEPHONE ENCOUNTER
Called pt's daughter April, per ABHIJIT, re 11/19/19 hospital f/up b/c pt [facility].  LVM for April to call office if pt need to reschedule & Appt NOT CXL'd -  to await her call to clarify.

## 2019-11-19 ENCOUNTER — OFFICE VISIT (OUTPATIENT)
Dept: INTERNAL MEDICINE | Facility: CLINIC | Age: 69
End: 2019-11-19

## 2019-11-19 ENCOUNTER — TELEPHONE (OUTPATIENT)
Dept: INTERNAL MEDICINE | Facility: CLINIC | Age: 69
End: 2019-11-19

## 2019-11-19 VITALS
WEIGHT: 165 LBS | SYSTOLIC BLOOD PRESSURE: 118 MMHG | DIASTOLIC BLOOD PRESSURE: 64 MMHG | OXYGEN SATURATION: 90 % | TEMPERATURE: 97 F | RESPIRATION RATE: 18 BRPM | HEIGHT: 68 IN | HEART RATE: 81 BPM | BODY MASS INDEX: 25.01 KG/M2

## 2019-11-19 DIAGNOSIS — J96.22 ACUTE ON CHRONIC RESPIRATORY FAILURE WITH HYPOXIA AND HYPERCAPNIA (HCC): ICD-10-CM

## 2019-11-19 DIAGNOSIS — J18.9 PNEUMONIA DUE TO INFECTIOUS ORGANISM, UNSPECIFIED LATERALITY, UNSPECIFIED PART OF LUNG: ICD-10-CM

## 2019-11-19 DIAGNOSIS — J44.9 STEROID-DEPENDENT COPD (HCC): Primary | ICD-10-CM

## 2019-11-19 DIAGNOSIS — J44.1 COPD WITH EXACERBATION (HCC): ICD-10-CM

## 2019-11-19 DIAGNOSIS — N18.9 CHRONIC KIDNEY DISEASE, UNSPECIFIED CKD STAGE: ICD-10-CM

## 2019-11-19 DIAGNOSIS — D64.9 ANEMIA, UNSPECIFIED TYPE: ICD-10-CM

## 2019-11-19 DIAGNOSIS — J96.21 ACUTE ON CHRONIC RESPIRATORY FAILURE WITH HYPOXIA AND HYPERCAPNIA (HCC): ICD-10-CM

## 2019-11-19 PROCEDURE — 99214 OFFICE O/P EST MOD 30 MIN: CPT | Performed by: INTERNAL MEDICINE

## 2019-11-19 NOTE — TELEPHONE ENCOUNTER
Called pt and asked him questions to order his wheelchair, pt states it is very difficult for him to walk and he has difficulty dressing and grooming himself. Pt does have a walker but he states he can only go so far and get's easily out of breath due to having Chronic respiratory failure with hypoxia. Pt states the walker does not help him that well.

## 2019-11-19 NOTE — PROGRESS NOTES
Transitional Care Follow Up Visit  Subjective     Billingsnaty Pena is a 69 y.o. male who presents for a transitional care management visit.    Within 48 business hours after discharge our office contacted him via telephone to coordinate his care and needs.      I reviewed and discussed the details of that call along with the discharge summary, hospital problems, inpatient lab results, inpatient diagnostic studies, and consultation reports with Jani.     Current outpatient and discharge medications have been reconciled for the patient.  Reviewed by: Miguel Rojas MD      Date of TCM Phone Call 5/29/2018   [facility]   Date of Admission 5/24/2018   Date of Discharge 5/25/2018   Discharge Disposition Home or Self Care     Risk for Readmission (LACE) Score: 14 (11/14/2019  6:00 AM)      History of Present Illness   Course During Hospital Stay: Patient was recently discharged 5 days ago from the hospital to nursing home due to acute exacerbation of COPD respiratory failure and multiple falls.  Chest x-ray in the hospital showed possible pneumonia forming.  Patient is taking antibiotics now.  After patient was discharged patient denies any falls.  Baseline short of breath.  Kidney function is still declined and up still anemic.     The following portions of the patient's history were reviewed and updated as appropriate: allergies, current medications, past family history, past medical history, past social history, past surgical history and problem list.    Review of Systems   Constitutional: Negative.    Respiratory: Positive for cough, shortness of breath and wheezing.    Cardiovascular: Negative.    Gastrointestinal: Negative.  Negative for abdominal pain.   Musculoskeletal: Negative.    Skin: Negative.    Neurological: Negative.    Psychiatric/Behavioral: Negative.        Objective   Physical Exam   Constitutional: He is oriented to person, place, and time. He appears well-developed and  well-nourished.   Neck: Neck supple.   Cardiovascular: Normal rate, regular rhythm and normal heart sounds.   Pulmonary/Chest: Effort normal and breath sounds normal.   Abdominal: Bowel sounds are normal. He exhibits no distension. There is no tenderness. There is no guarding.   Neurological: He is alert and oriented to person, place, and time.   Skin: Skin is warm.   Psychiatric: He has a normal mood and affect.       Assessment/Plan   Jani was seen today for follow-up.    Diagnoses and all orders for this visit:    Steroid-dependent COPD (CMS/HCC) continue medication    COPD with exacerbation (CMS/HCC) continue medication    Acute on chronic respiratory failure with hypoxia and hypercapnia (CMS/HCC) improved    Anemia, unspecified type will watch repeat next    Pneumonia due to infectious organism, unspecified laterality, unspecified part of lung repeat the next    Chronic kidney disease, unspecified CKD stage follow-up with kidney doctor    Patient has a mobility limitation that significantly impair his ability to participate in mobility related activities of daily living such as toileting feeding dressing grooming and bathing in customary within the house due to severe COPD.  Unable to use cane or walker.  The use of manual wheelchair will significantly improve patient's ability to participate ADLs and the patient will use it on a regular basis within the home.  Patient does have a Caregiver within to provide assistance with the wheelchair.    Follow-up in 1 month

## 2019-12-10 ENCOUNTER — TELEPHONE (OUTPATIENT)
Dept: INTERNAL MEDICINE | Facility: CLINIC | Age: 69
End: 2019-12-10

## 2019-12-10 NOTE — TELEPHONE ENCOUNTER
Called and left a vm with pt to ask him a couple questions about his [facility] paper in order to fax.

## 2019-12-13 ENCOUNTER — APPOINTMENT (OUTPATIENT)
Dept: GENERAL RADIOLOGY | Facility: HOSPITAL | Age: 69
End: 2019-12-13

## 2019-12-13 ENCOUNTER — HOSPITAL ENCOUNTER (INPATIENT)
Facility: HOSPITAL | Age: 69
LOS: 5 days | Discharge: HOME HEALTH | End: 2019-12-18
Attending: EMERGENCY MEDICINE | Admitting: INTERNAL MEDICINE

## 2019-12-13 DIAGNOSIS — J96.22 ACUTE ON CHRONIC RESPIRATORY FAILURE WITH HYPERCAPNIA (HCC): Primary | ICD-10-CM

## 2019-12-13 DIAGNOSIS — J44.1 COPD WITH EXACERBATION (HCC): ICD-10-CM

## 2019-12-13 DIAGNOSIS — N30.00 ACUTE CYSTITIS WITHOUT HEMATURIA: ICD-10-CM

## 2019-12-13 DIAGNOSIS — N17.9 ACUTE RENAL FAILURE SUPERIMPOSED ON CHRONIC KIDNEY DISEASE, UNSPECIFIED CKD STAGE, UNSPECIFIED ACUTE RENAL FAILURE TYPE (HCC): ICD-10-CM

## 2019-12-13 DIAGNOSIS — Z74.09 IMPAIRED MOBILITY AND ADLS: ICD-10-CM

## 2019-12-13 DIAGNOSIS — Z78.9 IMPAIRED MOBILITY AND ADLS: ICD-10-CM

## 2019-12-13 DIAGNOSIS — N18.9 ACUTE RENAL FAILURE SUPERIMPOSED ON CHRONIC KIDNEY DISEASE, UNSPECIFIED CKD STAGE, UNSPECIFIED ACUTE RENAL FAILURE TYPE (HCC): ICD-10-CM

## 2019-12-13 DIAGNOSIS — Z87.39 HISTORY OF ARTHRITIS: ICD-10-CM

## 2019-12-13 LAB
A-A DO2: ABNORMAL
ALBUMIN SERPL-MCNC: 4 G/DL (ref 3.5–5.2)
ALBUMIN/GLOB SERPL: 1.5 G/DL
ALP SERPL-CCNC: 75 U/L (ref 39–117)
ALT SERPL W P-5'-P-CCNC: 12 U/L (ref 1–41)
ANION GAP SERPL CALCULATED.3IONS-SCNC: 14 MMOL/L (ref 5–15)
ARTERIAL PATENCY WRIST A: POSITIVE
AST SERPL-CCNC: 24 U/L (ref 1–40)
ATMOSPHERIC PRESS: 729 MMHG
BACTERIA UR QL AUTO: ABNORMAL /HPF
BASE EXCESS BLDA CALC-SCNC: 10 MMOL/L (ref 0–2)
BASOPHILS # BLD AUTO: 0.03 10*3/MM3 (ref 0–0.2)
BASOPHILS NFR BLD AUTO: 0.4 % (ref 0–1.5)
BDY SITE: ABNORMAL
BILIRUB SERPL-MCNC: 0.8 MG/DL (ref 0.2–1.2)
BILIRUB UR QL STRIP: NEGATIVE
BUN BLD-MCNC: 46 MG/DL (ref 8–23)
BUN/CREAT SERPL: 19.9 (ref 7–25)
CALCIUM SPEC-SCNC: 9.5 MG/DL (ref 8.6–10.5)
CHLORIDE SERPL-SCNC: 94 MMOL/L (ref 98–107)
CLARITY UR: CLEAR
CO2 SERPL-SCNC: 33 MMOL/L (ref 22–29)
COHGB MFR BLD: 0.9 % (ref 0–2)
COLOR UR: YELLOW
CREAT BLD-MCNC: 2.31 MG/DL (ref 0.76–1.27)
D-LACTATE SERPL-SCNC: 1.3 MMOL/L (ref 0.5–2)
DEPRECATED RDW RBC AUTO: 45.8 FL (ref 37–54)
EOSINOPHIL # BLD AUTO: 0.07 10*3/MM3 (ref 0–0.4)
EOSINOPHIL NFR BLD AUTO: 0.9 % (ref 0.3–6.2)
ERYTHROCYTE [DISTWIDTH] IN BLOOD BY AUTOMATED COUNT: 12.8 % (ref 12.3–15.4)
GAS FLOW AIRWAY: 4 LPM
GFR SERPL CREATININE-BSD FRML MDRD: 28 ML/MIN/1.73
GLOBULIN UR ELPH-MCNC: 2.7 GM/DL
GLUCOSE BLD-MCNC: 109 MG/DL (ref 65–99)
GLUCOSE UR STRIP-MCNC: NEGATIVE MG/DL
HCO3 BLDA-SCNC: 36.8 MMOL/L (ref 22–28)
HCT VFR BLD AUTO: 31.3 % (ref 37.5–51)
HCT VFR BLD CALC: 30.1 %
HGB BLD-MCNC: 9.7 G/DL (ref 13–17.7)
HGB BLDA-MCNC: 9.8 G/DL (ref 12–18)
HGB UR QL STRIP.AUTO: NEGATIVE
HYALINE CASTS UR QL AUTO: ABNORMAL /LPF
IMM GRANULOCYTES # BLD AUTO: 0.04 10*3/MM3 (ref 0–0.05)
IMM GRANULOCYTES NFR BLD AUTO: 0.5 % (ref 0–0.5)
KETONES UR QL STRIP: NEGATIVE
LEUKOCYTE ESTERASE UR QL STRIP.AUTO: ABNORMAL
LIPASE SERPL-CCNC: 18 U/L (ref 13–60)
LYMPHOCYTES # BLD AUTO: 0.78 10*3/MM3 (ref 0.7–3.1)
LYMPHOCYTES NFR BLD AUTO: 9.8 % (ref 19.6–45.3)
MCH RBC QN AUTO: 30.3 PG (ref 26.6–33)
MCHC RBC AUTO-ENTMCNC: 31 G/DL (ref 31.5–35.7)
MCV RBC AUTO: 97.8 FL (ref 79–97)
METHGB BLD QL: 1.3 % (ref 0–1.5)
MODALITY: ABNORMAL
MONOCYTES # BLD AUTO: 0.8 10*3/MM3 (ref 0.1–0.9)
MONOCYTES NFR BLD AUTO: 10 % (ref 5–12)
NEUTROPHILS # BLD AUTO: 6.28 10*3/MM3 (ref 1.7–7)
NEUTROPHILS NFR BLD AUTO: 78.4 % (ref 42.7–76)
NITRITE UR QL STRIP: NEGATIVE
NOTE: ABNORMAL
NRBC BLD AUTO-RTO: 0 /100 WBC (ref 0–0.2)
NT-PROBNP SERPL-MCNC: 2393 PG/ML (ref 5–900)
OXYHGB MFR BLDV: 95.1 % (ref 94–99)
PCO2 BLDA: 61.8 MM HG (ref 35–45)
PCO2 TEMP ADJ BLD: ABNORMAL MM[HG]
PH BLDA: 7.38 PH UNITS (ref 7.3–7.5)
PH UR STRIP.AUTO: 5.5 [PH] (ref 5–8)
PH, TEMP CORRECTED: ABNORMAL
PLATELET # BLD AUTO: 189 10*3/MM3 (ref 140–450)
PMV BLD AUTO: 9 FL (ref 6–12)
PO2 BLDA: 93.5 MM HG (ref 75–100)
PO2 TEMP ADJ BLD: ABNORMAL MM[HG]
POTASSIUM BLD-SCNC: 4.3 MMOL/L (ref 3.5–5.2)
PROCALCITONIN SERPL-MCNC: 0.18 NG/ML (ref 0.1–0.25)
PROT SERPL-MCNC: 6.7 G/DL (ref 6–8.5)
PROT UR QL STRIP: NEGATIVE
RBC # BLD AUTO: 3.2 10*6/MM3 (ref 4.14–5.8)
RBC # UR: ABNORMAL /HPF
REF LAB TEST METHOD: ABNORMAL
SAO2 % BLDCOA: 97.2 % (ref 94–100)
SODIUM BLD-SCNC: 141 MMOL/L (ref 136–145)
SP GR UR STRIP: 1.01 (ref 1–1.03)
SQUAMOUS #/AREA URNS HPF: ABNORMAL /HPF
TROPONIN T SERPL-MCNC: 0.03 NG/ML (ref 0–0.03)
TROPONIN T SERPL-MCNC: 0.03 NG/ML (ref 0–0.03)
UROBILINOGEN UR QL STRIP: ABNORMAL
VENTILATOR MODE: ABNORMAL
WBC NRBC COR # BLD: 8 10*3/MM3 (ref 3.4–10.8)
WBC UR QL AUTO: ABNORMAL /HPF

## 2019-12-13 PROCEDURE — 80053 COMPREHEN METABOLIC PANEL: CPT | Performed by: NURSE PRACTITIONER

## 2019-12-13 PROCEDURE — 84145 PROCALCITONIN (PCT): CPT | Performed by: NURSE PRACTITIONER

## 2019-12-13 PROCEDURE — 94640 AIRWAY INHALATION TREATMENT: CPT

## 2019-12-13 PROCEDURE — 85025 COMPLETE CBC W/AUTO DIFF WBC: CPT | Performed by: NURSE PRACTITIONER

## 2019-12-13 PROCEDURE — 84484 ASSAY OF TROPONIN QUANT: CPT | Performed by: FAMILY MEDICINE

## 2019-12-13 PROCEDURE — 87086 URINE CULTURE/COLONY COUNT: CPT | Performed by: NURSE PRACTITIONER

## 2019-12-13 PROCEDURE — 25010000002 ENOXAPARIN PER 10 MG: Performed by: FAMILY MEDICINE

## 2019-12-13 PROCEDURE — 71046 X-RAY EXAM CHEST 2 VIEWS: CPT

## 2019-12-13 PROCEDURE — 94799 UNLISTED PULMONARY SVC/PX: CPT

## 2019-12-13 PROCEDURE — 99285 EMERGENCY DEPT VISIT HI MDM: CPT

## 2019-12-13 PROCEDURE — 82805 BLOOD GASES W/O2 SATURATION: CPT

## 2019-12-13 PROCEDURE — 82375 ASSAY CARBOXYHB QUANT: CPT

## 2019-12-13 PROCEDURE — 36600 WITHDRAWAL OF ARTERIAL BLOOD: CPT

## 2019-12-13 PROCEDURE — 83880 ASSAY OF NATRIURETIC PEPTIDE: CPT | Performed by: NURSE PRACTITIONER

## 2019-12-13 PROCEDURE — 84484 ASSAY OF TROPONIN QUANT: CPT | Performed by: NURSE PRACTITIONER

## 2019-12-13 PROCEDURE — 87076 CULTURE ANAEROBE IDENT EACH: CPT | Performed by: NURSE PRACTITIONER

## 2019-12-13 PROCEDURE — 83605 ASSAY OF LACTIC ACID: CPT | Performed by: NURSE PRACTITIONER

## 2019-12-13 PROCEDURE — 25010000002 CEFTRIAXONE SODIUM-DEXTROSE 1-3.74 GM-%(50ML) RECONSTITUTED SOLUTION: Performed by: NURSE PRACTITIONER

## 2019-12-13 PROCEDURE — 99222 1ST HOSP IP/OBS MODERATE 55: CPT | Performed by: FAMILY MEDICINE

## 2019-12-13 PROCEDURE — 83690 ASSAY OF LIPASE: CPT | Performed by: NURSE PRACTITIONER

## 2019-12-13 PROCEDURE — 81001 URINALYSIS AUTO W/SCOPE: CPT | Performed by: NURSE PRACTITIONER

## 2019-12-13 PROCEDURE — 25010000002 METHYLPREDNISOLONE PER 125 MG: Performed by: NURSE PRACTITIONER

## 2019-12-13 PROCEDURE — 83050 HGB METHEMOGLOBIN QUAN: CPT

## 2019-12-13 PROCEDURE — 87040 BLOOD CULTURE FOR BACTERIA: CPT | Performed by: FAMILY MEDICINE

## 2019-12-13 PROCEDURE — 93005 ELECTROCARDIOGRAM TRACING: CPT

## 2019-12-13 RX ORDER — SODIUM CHLORIDE, SODIUM LACTATE, POTASSIUM CHLORIDE, CALCIUM CHLORIDE 600; 310; 30; 20 MG/100ML; MG/100ML; MG/100ML; MG/100ML
50 INJECTION, SOLUTION INTRAVENOUS CONTINUOUS
Status: DISCONTINUED | OUTPATIENT
Start: 2019-12-13 | End: 2019-12-14

## 2019-12-13 RX ORDER — TAMSULOSIN HYDROCHLORIDE 0.4 MG/1
0.4 CAPSULE ORAL NIGHTLY
Status: DISCONTINUED | OUTPATIENT
Start: 2019-12-13 | End: 2019-12-18 | Stop reason: HOSPADM

## 2019-12-13 RX ORDER — SODIUM CHLORIDE 0.9 % (FLUSH) 0.9 %
10 SYRINGE (ML) INJECTION AS NEEDED
Status: DISCONTINUED | OUTPATIENT
Start: 2019-12-13 | End: 2019-12-18 | Stop reason: HOSPADM

## 2019-12-13 RX ORDER — CHOLECALCIFEROL (VITAMIN D3) 125 MCG
5 CAPSULE ORAL NIGHTLY
Status: DISCONTINUED | OUTPATIENT
Start: 2019-12-13 | End: 2019-12-18 | Stop reason: HOSPADM

## 2019-12-13 RX ORDER — METHYLPREDNISOLONE SODIUM SUCCINATE 125 MG/2ML
125 INJECTION, POWDER, LYOPHILIZED, FOR SOLUTION INTRAMUSCULAR; INTRAVENOUS ONCE
Status: COMPLETED | OUTPATIENT
Start: 2019-12-13 | End: 2019-12-13

## 2019-12-13 RX ORDER — ACETAMINOPHEN 650 MG/1
650 SUPPOSITORY RECTAL EVERY 4 HOURS PRN
Status: DISCONTINUED | OUTPATIENT
Start: 2019-12-13 | End: 2019-12-18 | Stop reason: HOSPADM

## 2019-12-13 RX ORDER — PROMETHAZINE HYDROCHLORIDE 25 MG/ML
12.5 INJECTION, SOLUTION INTRAMUSCULAR; INTRAVENOUS EVERY 6 HOURS PRN
Status: DISCONTINUED | OUTPATIENT
Start: 2019-12-13 | End: 2019-12-18 | Stop reason: HOSPADM

## 2019-12-13 RX ORDER — GUAIFENESIN 600 MG/1
600 TABLET, EXTENDED RELEASE ORAL 2 TIMES DAILY
Status: DISCONTINUED | OUTPATIENT
Start: 2019-12-13 | End: 2019-12-18 | Stop reason: HOSPADM

## 2019-12-13 RX ORDER — CLOPIDOGREL BISULFATE 75 MG/1
75 TABLET ORAL DAILY
Status: DISCONTINUED | OUTPATIENT
Start: 2019-12-14 | End: 2019-12-18 | Stop reason: HOSPADM

## 2019-12-13 RX ORDER — SODIUM CHLORIDE 0.9 % (FLUSH) 0.9 %
10 SYRINGE (ML) INJECTION EVERY 12 HOURS SCHEDULED
Status: DISCONTINUED | OUTPATIENT
Start: 2019-12-13 | End: 2019-12-18 | Stop reason: HOSPADM

## 2019-12-13 RX ORDER — PANTOPRAZOLE SODIUM 40 MG/1
40 TABLET, DELAYED RELEASE ORAL DAILY
Status: DISCONTINUED | OUTPATIENT
Start: 2019-12-14 | End: 2019-12-18 | Stop reason: HOSPADM

## 2019-12-13 RX ORDER — ESCITALOPRAM OXALATE 20 MG/1
20 TABLET ORAL DAILY
Status: DISCONTINUED | OUTPATIENT
Start: 2019-12-14 | End: 2019-12-18 | Stop reason: HOSPADM

## 2019-12-13 RX ORDER — IPRATROPIUM BROMIDE AND ALBUTEROL SULFATE 2.5; .5 MG/3ML; MG/3ML
3 SOLUTION RESPIRATORY (INHALATION) EVERY 4 HOURS PRN
Status: DISCONTINUED | OUTPATIENT
Start: 2019-12-13 | End: 2019-12-18 | Stop reason: HOSPADM

## 2019-12-13 RX ORDER — METHYLPREDNISOLONE SODIUM SUCCINATE 125 MG/2ML
80 INJECTION, POWDER, LYOPHILIZED, FOR SOLUTION INTRAMUSCULAR; INTRAVENOUS EVERY 8 HOURS
Status: DISCONTINUED | OUTPATIENT
Start: 2019-12-14 | End: 2019-12-14

## 2019-12-13 RX ORDER — BENZONATATE 100 MG/1
100 CAPSULE ORAL 3 TIMES DAILY PRN
Status: DISCONTINUED | OUTPATIENT
Start: 2019-12-13 | End: 2019-12-18 | Stop reason: HOSPADM

## 2019-12-13 RX ORDER — BUDESONIDE 0.5 MG/2ML
0.5 INHALANT ORAL
Status: DISCONTINUED | OUTPATIENT
Start: 2019-12-13 | End: 2019-12-18 | Stop reason: HOSPADM

## 2019-12-13 RX ORDER — IPRATROPIUM BROMIDE AND ALBUTEROL SULFATE 2.5; .5 MG/3ML; MG/3ML
3 SOLUTION RESPIRATORY (INHALATION)
Status: DISCONTINUED | OUTPATIENT
Start: 2019-12-14 | End: 2019-12-18 | Stop reason: HOSPADM

## 2019-12-13 RX ORDER — AMLODIPINE BESYLATE 5 MG/1
5 TABLET ORAL
Status: DISCONTINUED | OUTPATIENT
Start: 2019-12-14 | End: 2019-12-18 | Stop reason: HOSPADM

## 2019-12-13 RX ORDER — PROMETHAZINE HYDROCHLORIDE 12.5 MG/1
12.5 TABLET ORAL EVERY 6 HOURS PRN
Status: DISCONTINUED | OUTPATIENT
Start: 2019-12-13 | End: 2019-12-18 | Stop reason: HOSPADM

## 2019-12-13 RX ORDER — LEVOFLOXACIN 5 MG/ML
250 INJECTION, SOLUTION INTRAVENOUS EVERY 24 HOURS
Status: DISCONTINUED | OUTPATIENT
Start: 2019-12-15 | End: 2019-12-15

## 2019-12-13 RX ORDER — LEVOFLOXACIN 5 MG/ML
500 INJECTION, SOLUTION INTRAVENOUS ONCE
Status: COMPLETED | OUTPATIENT
Start: 2019-12-14 | End: 2019-12-13

## 2019-12-13 RX ORDER — ASPIRIN 81 MG/1
81 TABLET ORAL DAILY
Status: DISCONTINUED | OUTPATIENT
Start: 2019-12-14 | End: 2019-12-18 | Stop reason: HOSPADM

## 2019-12-13 RX ORDER — FINASTERIDE 5 MG/1
5 TABLET, FILM COATED ORAL DAILY
Status: DISCONTINUED | OUTPATIENT
Start: 2019-12-14 | End: 2019-12-18 | Stop reason: HOSPADM

## 2019-12-13 RX ORDER — PROMETHAZINE HYDROCHLORIDE 25 MG/1
12.5 SUPPOSITORY RECTAL EVERY 6 HOURS PRN
Status: DISCONTINUED | OUTPATIENT
Start: 2019-12-13 | End: 2019-12-18 | Stop reason: HOSPADM

## 2019-12-13 RX ORDER — ACETAMINOPHEN 325 MG/1
650 TABLET ORAL EVERY 4 HOURS PRN
Status: DISCONTINUED | OUTPATIENT
Start: 2019-12-13 | End: 2019-12-18 | Stop reason: HOSPADM

## 2019-12-13 RX ORDER — LISINOPRIL 5 MG/1
5 TABLET ORAL DAILY
Status: DISCONTINUED | OUTPATIENT
Start: 2019-12-14 | End: 2019-12-18 | Stop reason: HOSPADM

## 2019-12-13 RX ORDER — ACETAMINOPHEN 160 MG/5ML
650 SOLUTION ORAL EVERY 4 HOURS PRN
Status: DISCONTINUED | OUTPATIENT
Start: 2019-12-13 | End: 2019-12-18 | Stop reason: HOSPADM

## 2019-12-13 RX ORDER — ATORVASTATIN CALCIUM 10 MG/1
10 TABLET, FILM COATED ORAL NIGHTLY
Status: DISCONTINUED | OUTPATIENT
Start: 2019-12-13 | End: 2019-12-18 | Stop reason: HOSPADM

## 2019-12-13 RX ORDER — CYCLOBENZAPRINE HCL 10 MG
10 TABLET ORAL 3 TIMES DAILY PRN
Status: DISCONTINUED | OUTPATIENT
Start: 2019-12-13 | End: 2019-12-18 | Stop reason: HOSPADM

## 2019-12-13 RX ORDER — CEFTRIAXONE 1 G/50ML
1 INJECTION, SOLUTION INTRAVENOUS ONCE
Status: COMPLETED | OUTPATIENT
Start: 2019-12-13 | End: 2019-12-13

## 2019-12-13 RX ORDER — TRAZODONE HYDROCHLORIDE 50 MG/1
50 TABLET ORAL NIGHTLY
Status: DISCONTINUED | OUTPATIENT
Start: 2019-12-13 | End: 2019-12-18 | Stop reason: HOSPADM

## 2019-12-13 RX ORDER — LIDOCAINE 50 MG/G
1 PATCH TOPICAL
Status: DISCONTINUED | OUTPATIENT
Start: 2019-12-14 | End: 2019-12-18 | Stop reason: HOSPADM

## 2019-12-13 RX ORDER — FUROSEMIDE 20 MG/1
20 TABLET ORAL DAILY
Status: DISCONTINUED | OUTPATIENT
Start: 2019-12-14 | End: 2019-12-18 | Stop reason: HOSPADM

## 2019-12-13 RX ORDER — BISACODYL 10 MG
10 SUPPOSITORY, RECTAL RECTAL DAILY PRN
Status: DISCONTINUED | OUTPATIENT
Start: 2019-12-13 | End: 2019-12-18 | Stop reason: HOSPADM

## 2019-12-13 RX ORDER — POLYETHYLENE GLYCOL 3350 17 G/17G
17 POWDER, FOR SOLUTION ORAL DAILY PRN
Status: DISCONTINUED | OUTPATIENT
Start: 2019-12-13 | End: 2019-12-18 | Stop reason: HOSPADM

## 2019-12-13 RX ADMIN — TAMSULOSIN HYDROCHLORIDE 0.4 MG: 0.4 CAPSULE ORAL at 23:14

## 2019-12-13 RX ADMIN — SODIUM CHLORIDE, POTASSIUM CHLORIDE, SODIUM LACTATE AND CALCIUM CHLORIDE 50 ML/HR: 600; 310; 30; 20 INJECTION, SOLUTION INTRAVENOUS at 23:13

## 2019-12-13 RX ADMIN — METHYLPREDNISOLONE SODIUM SUCCINATE 125 MG: 125 INJECTION, POWDER, FOR SOLUTION INTRAMUSCULAR; INTRAVENOUS at 20:47

## 2019-12-13 RX ADMIN — BENZONATATE 100 MG: 100 CAPSULE ORAL at 23:14

## 2019-12-13 RX ADMIN — SODIUM CHLORIDE 500 ML: 9 INJECTION, SOLUTION INTRAVENOUS at 19:37

## 2019-12-13 RX ADMIN — GUAIFENESIN 600 MG: 600 TABLET, EXTENDED RELEASE ORAL at 23:14

## 2019-12-13 RX ADMIN — BUDESONIDE 0.5 MG: 0.5 INHALANT RESPIRATORY (INHALATION) at 23:33

## 2019-12-13 RX ADMIN — TRAZODONE HYDROCHLORIDE 50 MG: 50 TABLET ORAL at 23:14

## 2019-12-13 RX ADMIN — SODIUM CHLORIDE, PRESERVATIVE FREE 10 ML: 5 INJECTION INTRAVENOUS at 23:15

## 2019-12-13 RX ADMIN — ENOXAPARIN SODIUM 40 MG: 40 INJECTION SUBCUTANEOUS at 23:14

## 2019-12-13 RX ADMIN — LEVOFLOXACIN 500 MG: 5 INJECTION, SOLUTION INTRAVENOUS at 23:14

## 2019-12-13 RX ADMIN — METOPROLOL TARTRATE 25 MG: 25 TABLET ORAL at 23:14

## 2019-12-13 RX ADMIN — CEFTRIAXONE 1 G: 1 INJECTION, SOLUTION INTRAVENOUS at 20:47

## 2019-12-13 RX ADMIN — ATORVASTATIN CALCIUM 10 MG: 10 TABLET, FILM COATED ORAL at 23:14

## 2019-12-13 RX ADMIN — MELATONIN TAB 5 MG 5 MG: 5 TAB at 23:14

## 2019-12-13 RX ADMIN — IPRATROPIUM BROMIDE AND ALBUTEROL SULFATE 3 ML: .5; 3 SOLUTION RESPIRATORY (INHALATION) at 23:34

## 2019-12-14 LAB
ANION GAP SERPL CALCULATED.3IONS-SCNC: 11.6 MMOL/L (ref 5–15)
BACTERIA SPEC AEROBE CULT: ABNORMAL
BASOPHILS # BLD AUTO: 0.01 10*3/MM3 (ref 0–0.2)
BASOPHILS NFR BLD AUTO: 0.2 % (ref 0–1.5)
BUN BLD-MCNC: 42 MG/DL (ref 8–23)
BUN/CREAT SERPL: 19.6 (ref 7–25)
CALCIUM SPEC-SCNC: 9.4 MG/DL (ref 8.6–10.5)
CHLORIDE SERPL-SCNC: 95 MMOL/L (ref 98–107)
CO2 SERPL-SCNC: 32.4 MMOL/L (ref 22–29)
CREAT BLD-MCNC: 2.14 MG/DL (ref 0.76–1.27)
DEPRECATED RDW RBC AUTO: 45.5 FL (ref 37–54)
EOSINOPHIL # BLD AUTO: 0 10*3/MM3 (ref 0–0.4)
EOSINOPHIL NFR BLD AUTO: 0 % (ref 0.3–6.2)
ERYTHROCYTE [DISTWIDTH] IN BLOOD BY AUTOMATED COUNT: 12.6 % (ref 12.3–15.4)
GFR SERPL CREATININE-BSD FRML MDRD: 31 ML/MIN/1.73
GLUCOSE BLD-MCNC: 184 MG/DL (ref 65–99)
HCT VFR BLD AUTO: 30.7 % (ref 37.5–51)
HGB BLD-MCNC: 9.6 G/DL (ref 13–17.7)
IMM GRANULOCYTES # BLD AUTO: 0.02 10*3/MM3 (ref 0–0.05)
IMM GRANULOCYTES NFR BLD AUTO: 0.4 % (ref 0–0.5)
LYMPHOCYTES # BLD AUTO: 0.58 10*3/MM3 (ref 0.7–3.1)
LYMPHOCYTES NFR BLD AUTO: 10.5 % (ref 19.6–45.3)
MCH RBC QN AUTO: 30.3 PG (ref 26.6–33)
MCHC RBC AUTO-ENTMCNC: 31.3 G/DL (ref 31.5–35.7)
MCV RBC AUTO: 96.8 FL (ref 79–97)
MONOCYTES # BLD AUTO: 0.1 10*3/MM3 (ref 0.1–0.9)
MONOCYTES NFR BLD AUTO: 1.8 % (ref 5–12)
NEUTROPHILS # BLD AUTO: 4.82 10*3/MM3 (ref 1.7–7)
NEUTROPHILS NFR BLD AUTO: 87.1 % (ref 42.7–76)
NRBC BLD AUTO-RTO: 0 /100 WBC (ref 0–0.2)
PLATELET # BLD AUTO: 193 10*3/MM3 (ref 140–450)
PMV BLD AUTO: 9.3 FL (ref 6–12)
POTASSIUM BLD-SCNC: 4.6 MMOL/L (ref 3.5–5.2)
RBC # BLD AUTO: 3.17 10*6/MM3 (ref 4.14–5.8)
SODIUM BLD-SCNC: 139 MMOL/L (ref 136–145)
TROPONIN T SERPL-MCNC: 0.02 NG/ML (ref 0–0.03)
WBC NRBC COR # BLD: 5.53 10*3/MM3 (ref 3.4–10.8)

## 2019-12-14 PROCEDURE — 94799 UNLISTED PULMONARY SVC/PX: CPT

## 2019-12-14 PROCEDURE — 84484 ASSAY OF TROPONIN QUANT: CPT | Performed by: FAMILY MEDICINE

## 2019-12-14 PROCEDURE — 99232 SBSQ HOSP IP/OBS MODERATE 35: CPT | Performed by: INTERNAL MEDICINE

## 2019-12-14 PROCEDURE — 80048 BASIC METABOLIC PNL TOTAL CA: CPT | Performed by: FAMILY MEDICINE

## 2019-12-14 PROCEDURE — 25010000002 ENOXAPARIN PER 10 MG: Performed by: FAMILY MEDICINE

## 2019-12-14 PROCEDURE — 25010000002 LEVOFLOXACIN PER 250 MG: Performed by: FAMILY MEDICINE

## 2019-12-14 PROCEDURE — 85025 COMPLETE CBC W/AUTO DIFF WBC: CPT | Performed by: FAMILY MEDICINE

## 2019-12-14 PROCEDURE — 94660 CPAP INITIATION&MGMT: CPT

## 2019-12-14 PROCEDURE — 97161 PT EVAL LOW COMPLEX 20 MIN: CPT

## 2019-12-14 PROCEDURE — 25010000002 METHYLPREDNISOLONE PER 125 MG: Performed by: FAMILY MEDICINE

## 2019-12-14 PROCEDURE — 25010000002 METHYLPREDNISOLONE PER 40 MG: Performed by: INTERNAL MEDICINE

## 2019-12-14 RX ORDER — METHYLPREDNISOLONE SODIUM SUCCINATE 40 MG/ML
40 INJECTION, POWDER, LYOPHILIZED, FOR SOLUTION INTRAMUSCULAR; INTRAVENOUS EVERY 12 HOURS
Status: COMPLETED | OUTPATIENT
Start: 2019-12-14 | End: 2019-12-17

## 2019-12-14 RX ADMIN — GUAIFENESIN 600 MG: 600 TABLET, EXTENDED RELEASE ORAL at 20:36

## 2019-12-14 RX ADMIN — BENZONATATE 100 MG: 100 CAPSULE ORAL at 20:36

## 2019-12-14 RX ADMIN — FINASTERIDE 5 MG: 5 TABLET, FILM COATED ORAL at 08:17

## 2019-12-14 RX ADMIN — MELATONIN TAB 5 MG 5 MG: 5 TAB at 20:36

## 2019-12-14 RX ADMIN — BUDESONIDE 0.5 MG: 0.5 INHALANT RESPIRATORY (INHALATION) at 06:39

## 2019-12-14 RX ADMIN — LIDOCAINE 1 PATCH: 50 PATCH CUTANEOUS at 08:15

## 2019-12-14 RX ADMIN — IPRATROPIUM BROMIDE AND ALBUTEROL SULFATE 3 ML: .5; 3 SOLUTION RESPIRATORY (INHALATION) at 20:16

## 2019-12-14 RX ADMIN — TRAZODONE HYDROCHLORIDE 50 MG: 50 TABLET ORAL at 20:36

## 2019-12-14 RX ADMIN — FUROSEMIDE 20 MG: 20 TABLET ORAL at 08:16

## 2019-12-14 RX ADMIN — GUAIFENESIN 600 MG: 600 TABLET, EXTENDED RELEASE ORAL at 08:16

## 2019-12-14 RX ADMIN — METOPROLOL TARTRATE 25 MG: 25 TABLET ORAL at 08:17

## 2019-12-14 RX ADMIN — PANTOPRAZOLE SODIUM 40 MG: 40 TABLET, DELAYED RELEASE ORAL at 08:17

## 2019-12-14 RX ADMIN — ATORVASTATIN CALCIUM 10 MG: 10 TABLET, FILM COATED ORAL at 20:36

## 2019-12-14 RX ADMIN — IPRATROPIUM BROMIDE AND ALBUTEROL SULFATE 3 ML: .5; 3 SOLUTION RESPIRATORY (INHALATION) at 06:38

## 2019-12-14 RX ADMIN — METOPROLOL TARTRATE 25 MG: 25 TABLET ORAL at 20:36

## 2019-12-14 RX ADMIN — BENZONATATE 100 MG: 100 CAPSULE ORAL at 08:16

## 2019-12-14 RX ADMIN — BUDESONIDE 0.5 MG: 0.5 INHALANT RESPIRATORY (INHALATION) at 20:17

## 2019-12-14 RX ADMIN — SODIUM CHLORIDE, PRESERVATIVE FREE 10 ML: 5 INJECTION INTRAVENOUS at 08:17

## 2019-12-14 RX ADMIN — AMLODIPINE BESYLATE 5 MG: 5 TABLET ORAL at 08:16

## 2019-12-14 RX ADMIN — TAMSULOSIN HYDROCHLORIDE 0.4 MG: 0.4 CAPSULE ORAL at 20:36

## 2019-12-14 RX ADMIN — IPRATROPIUM BROMIDE AND ALBUTEROL SULFATE 3 ML: .5; 3 SOLUTION RESPIRATORY (INHALATION) at 12:49

## 2019-12-14 RX ADMIN — SODIUM CHLORIDE, PRESERVATIVE FREE 10 ML: 5 INJECTION INTRAVENOUS at 22:00

## 2019-12-14 RX ADMIN — ESCITALOPRAM OXALATE 20 MG: 20 TABLET ORAL at 08:17

## 2019-12-14 RX ADMIN — ASPIRIN 81 MG: 81 TABLET, COATED ORAL at 08:17

## 2019-12-14 RX ADMIN — ENOXAPARIN SODIUM 40 MG: 40 INJECTION SUBCUTANEOUS at 20:36

## 2019-12-14 RX ADMIN — ACETAMINOPHEN 650 MG: 325 TABLET, FILM COATED ORAL at 04:43

## 2019-12-14 RX ADMIN — METHYLPREDNISOLONE SODIUM SUCCINATE 80 MG: 125 INJECTION, POWDER, FOR SOLUTION INTRAMUSCULAR; INTRAVENOUS at 08:16

## 2019-12-14 RX ADMIN — LISINOPRIL 5 MG: 5 TABLET ORAL at 08:16

## 2019-12-14 RX ADMIN — METHYLPREDNISOLONE SODIUM SUCCINATE 40 MG: 40 INJECTION, POWDER, FOR SOLUTION INTRAMUSCULAR; INTRAVENOUS at 20:37

## 2019-12-14 RX ADMIN — CLOPIDOGREL BISULFATE 75 MG: 75 TABLET ORAL at 08:17

## 2019-12-14 NOTE — ED PROVIDER NOTES
Subjective   History of Present Illness  69-year-old male who has COPD who presents with worsening COPD symptoms over the last 7 to 8 days.  He said he has been more short of breath.  He has been coughing up yellow secretions.  He normally wears 4 L of O2 at home and he has continued on that but his breathing treatments are not helping as much.  The last couple days he has had some confusion.  He is not always sure what day it is.  He had a friend or relative go get an over-the-counter urine test and it demonstrated that he had a UTI.  He said that he has been coughing a lot more today and became concerned that he was having trouble remembering things so he came to the emergency department.  He denies any vomiting however he is not been eating and drinking well.  He denies any unusual edema.  He said he is never able to lie flat so he is not sure if that is been worse than normal.  He gets much more winded if he does try to walk at all.  He denies chest pain.  He said his chest is often tight because of his COPD.  He said that he is not having burning with urination but that it does hurt a little bit in his lower abdomen.  He is not sure if he has had a fever but he has had some chills.  Patient reports that he did have a fall because he felt weak a couple nights ago and has some bruising to his lower extremities bilaterally.  He said he is on blood thinner and that his skin is very fragile.  He said he is not having any pain or trouble walking because of the fall.  Did not hit his head.  He denies a headache.  Review of Systems   All other systems reviewed and are negative.      Past Medical History:   Diagnosis Date   • Abnormal liver enzymes    • Anemia    • Anxiety    • Arthritis    • Atherosclerotic heart disease of native coronary artery without angina pectoris    • Atrial fibrillation (CMS/HCC)    • Back pain    • BPH (benign prostatic hyperplasia)    • Cataract, bilateral    • Chest pain     2-3 MONTHS AGO.   PER PT, HAS ADDRESSED WITH CARDIOLOGIST.  STATES HAS NTG PRN.   • CHF (congestive heart failure) (CMS/HCC)    • Chronic bronchitis (CMS/HCC)    • Chronic kidney disease    • COPD (chronic obstructive pulmonary disease) (CMS/HCC)    • Degeneration of cervical intervertebral disc    • Depression    • Diverticulosis    • Dyspnea    • Esophageal reflux    • Esophagitis    • Gastritis    • Hematuria    • Hemorrhoids    • Hiatal hernia    • History of arthritis    • History of nuclear stress test     UNSURE OF DATE   • History of peripheral neuropathy    • History of ventricular septal defect    • History of ventricular septal defect    • Hyperlipidemia    • Hypertension    • Impaired functional mobility, balance, gait, and endurance    • Infectious diarrhea    • Kyphosis, acquired    • Lumbar radiculopathy    • Mitral and aortic valve disease    • Nephrolithiasis    • Phimosis    • Problems with swallowing     WITH FOOD   • Respiratory failure (CMS/HCC)    • Sleep apnea     BIPAP   • TIA (transient ischemic attack)     X3.   2014   • Ventral hernia    • Wears glasses     FOR READING       Allergies   Allergen Reactions   • Sulfa Antibiotics Nausea And Vomiting       Past Surgical History:   Procedure Laterality Date   • CARDIAC CATHETERIZATION     • CARDIAC CATHETERIZATION N/A 5/24/2018    Procedure: Left Heart Cath;  Surgeon: Edouard Robertson MD;  Location:  [facility] CATH INVASIVE LOCATION;  Service: Cardiovascular   • CARDIAC ELECTROPHYSIOLOGY PROCEDURE N/A 1/31/2019    Procedure: PACEMAKER IMPLANTATION- DC;  Surgeon: Omar Encinas DO;  Location:  [facility] INVASIVE LOCATION;  Service: Cardiology   • ENDOSCOPY N/A 1/26/2019    Procedure: ESOPHAGOGASTRODUODENOSCOPY;  Surgeon: Brunner, Mark I, MD;  Location:  [facility];  Service: Gastroenterology   • HERNIA REPAIR     • ORTHOPEDIC SURGERY Left     Left hip arthroplasty   • PACEMAKER IMPLANTATION  01/31/2019   • SUPRAPUBIC CATHETER INSERTION      History of Percutaneous  Catheter Placement Into Ureter   • THROAT SURGERY      FOR SLEEP APNEA   • VSD REPAIR      History of VSD Repair By Patch Closure       Family History   Problem Relation Age of Onset   • Other Father         CABG   • Coronary artery disease Father        Social History     Socioeconomic History   • Marital status:      Spouse name: Not on file   • Number of children: Not on file   • Years of education: Not on file   • Highest education level: Not on file   Occupational History     Employer: RETIRED   Tobacco Use   • Smoking status: Former Smoker     Packs/day: 3.00     Years: 30.00     Pack years: 90.00     Types: Cigarettes     Last attempt to quit: 2017     Years since quittin.9   • Smokeless tobacco: Never Used   Substance and Sexual Activity   • Alcohol use: No   • Drug use: No   • Sexual activity: Defer           Objective   Physical Exam   Constitutional: He is oriented to person, place, and time.   Elderly male who is ill-appearing, but in no acute distress   HENT:   Head: Normocephalic and atraumatic.   Mouth mucosa is dry   Eyes: Pupils are equal, round, and reactive to light. EOM are normal.   Neck: Normal range of motion. Neck supple.   Cardiovascular: Normal rate, regular rhythm, normal heart sounds and intact distal pulses.   Pulmonary/Chest: He has decreased breath sounds. He has wheezes. He has no rhonchi. He has no rales.   Patient has pursed lip breathing   Abdominal: Soft. Bowel sounds are normal. There is no tenderness.   Musculoskeletal: Normal range of motion.        Right lower leg: He exhibits no edema.        Left lower leg: He exhibits no edema.   Neurological: He is alert and oriented to person, place, and time.   He did have a little bit of trouble telling me the date otherwise he was alert to person place and time   Skin: Skin is warm and dry. Capillary refill takes less than 2 seconds. Ecchymosis noted.   Patient has multiple areas of skin tears and ecchymosis.      Psychiatric: He has a normal mood and affect. His behavior is normal.   Nursing note and vitals reviewed.      Procedures           ED Course  ED Course as of Dec 14 1122   Fri Dec 13, 2019   2059 X-rays negative for any acute cardiopulmonary abnormality.  He is CO2 is elevated at 61.8.  His urinalysis demonstrates some leukocytes and some WBCs 13-20.  Troponin is 0.034 which is essentially where it was approximately a month ago.  His BNP is elevated at 2400.  Procalcitonin is negative.  I will order a urine culture.  He was given Solu-Medrol 125 mg IV.  He was doing a breathing treatment on the way here in the squad.  His temp is 99.3.  He was given Rocephin 1 g IV.  I have spoke with Dr. Michael and due to his acute on chronic respiratory failure, mental status changes, UTI, acute on chronic renal failure will be admitted to telemetry.    [CM]      ED Course User Index  [CM] Erika Mendez, PAL                      No data recorded                        MDM    Final diagnoses:   Acute on chronic respiratory failure with hypercapnia (CMS/HCC)   Acute cystitis without hematuria   Acute renal failure superimposed on chronic kidney disease, unspecified CKD stage, unspecified acute renal failure type (CMS/HCC)              Erika Mendez, PAL  12/14/19 1122

## 2019-12-14 NOTE — ED NOTES
House Supervisor called about bed placement for admit. Patient will be going to room 310 tele. VERNON Capellan notified.      Yulia Marcum  12/13/19 [id number]

## 2019-12-14 NOTE — THERAPY EVALUATION
Patient Name: Jani Pena  : 1950    MRN: [id number]                              Today's Date: 2019       Admit Date: 2019    Visit Dx:     ICD-10-CM ICD-9-CM   1. Acute on chronic respiratory failure with hypercapnia (CMS/HCC) J96.22 518.84   2. Acute cystitis without hematuria N30.00 595.0   3. Acute renal failure superimposed on chronic kidney disease, unspecified CKD stage, unspecified acute renal failure type (CMS/HCC) N17.9 584.9    N18.9 585.9     Patient Active Problem List   Diagnosis   • Anxiety   • Atrial fibrillation (CMS/HCC)   • Chronic kidney disease   • Steroid-dependent COPD (CMS/HCC)   • Degeneration of cervical intervertebral disc   • GERD without esophagitis   • Diverticulosis   • Hemorrhoids   • Hiatal hernia   • Hyperlipidemia   • Essential hypertension   • Kyphosis, acquired   • PEDRO on CPAP   • Benign prostatic hyperplasia with incomplete bladder emptying   • History of arthritis   • Back pain   • Depression   • AQUINO (dyspnea on exertion)   • History of ventricular septal defect   • COPD with exacerbation (CMS/HCC)   • Pneumonia due to infectious organism   • Impaired mobility and ADLs   • Chronic diastolic congestive heart failure (CMS/HCC)   • Coronary artery disease involving native coronary artery of native heart with angina pectoris (CMS/HCC)   • Lumbar radiculopathy   • Dysphagia   • Anemia   • Cardiac pacemaker in situ   • Sick sinus syndrome (CMS/HCC)   • Polypharmacy   • Chronic respiratory failure with hypoxia and hypercapnia (CMS/HCC)   • Hematoma   • Acute on chronic respiratory failure with hypoxia and hypercapnia (CMS/HCC)   • Repeated falls   • Bruise of both arms   • Acute on chronic respiratory failure with hypercapnia (CMS/HCC)     Past Medical History:   Diagnosis Date   • Abnormal liver enzymes    • Anemia    • Anxiety    • Arthritis    • Atherosclerotic heart disease of native coronary artery without angina pectoris    • Atrial fibrillation  (CMS/[facility])    • Back pain    • BPH (benign prostatic hyperplasia)    • Cataract, bilateral    • Chest pain     2-3 MONTHS AGO.  PER PT, HAS ADDRESSED WITH CARDIOLOGIST.  STATES HAS NTG PRN.   • CHF (congestive heart failure) (CMS/HCC)    • Chronic bronchitis (CMS/HCC)    • Chronic kidney disease    • COPD (chronic obstructive pulmonary disease) (CMS/HCC)    • Degeneration of cervical intervertebral disc    • Depression    • Diverticulosis    • Dyspnea    • Esophageal reflux    • Esophagitis    • Gastritis    • Hematuria    • Hemorrhoids    • Hiatal hernia    • History of arthritis    • History of nuclear stress test     UNSURE OF DATE   • History of peripheral neuropathy    • History of ventricular septal defect    • History of ventricular septal defect    • Hyperlipidemia    • Hypertension    • Impaired functional mobility, balance, gait, and endurance    • Infectious diarrhea    • Kyphosis, acquired    • Lumbar radiculopathy    • Mitral and aortic valve disease    • Nephrolithiasis    • Phimosis    • Problems with swallowing     WITH FOOD   • Respiratory failure (CMS/[facility])    • Sleep apnea     BIPAP   • TIA (transient ischemic attack)     X3.   2014   • Ventral hernia    • Wears glasses     FOR READING     Past Surgical History:   Procedure Laterality Date   • CARDIAC CATHETERIZATION     • CARDIAC CATHETERIZATION N/A 5/24/2018    Procedure: Left Heart Cath;  Surgeon: Edouard Robertson MD;  Location:  [facility] CATH INVASIVE LOCATION;  Service: Cardiovascular   • CARDIAC ELECTROPHYSIOLOGY PROCEDURE N/A 1/31/2019    Procedure: PACEMAKER IMPLANTATION- DC;  Surgeon: Omar Encinas DO;  Location:  [facility] EP INVASIVE LOCATION;  Service: Cardiology   • ENDOSCOPY N/A 1/26/2019    Procedure: ESOPHAGOGASTRODUODENOSCOPY;  Surgeon: Brunner, Mark I, MD;  Location:  [facility];  Service: Gastroenterology   • HERNIA REPAIR     • ORTHOPEDIC SURGERY Left     Left hip arthroplasty   • PACEMAKER IMPLANTATION  01/31/2019   • SUPRAPUBIC  CATHETER INSERTION      History of Percutaneous Catheter Placement Into Ureter   • THROAT SURGERY      FOR SLEEP APNEA   • VSD REPAIR      History of VSD Repair By Patch Closure     General Information     Row Name 12/14/19 1000          PT Evaluation Time/Intention    Document Type  evaluation  -BS     Mode of Treatment  individual therapy;physical therapy  -BS     Row Name 12/14/19 1000          General Information    Patient Profile Reviewed?  yes  -BS     Prior Level of Function  community mobility;all household mobility;gait;transfer  -BS     Existing Precautions/Restrictions  fall  -BS     Barriers to Rehab  medically complex  -BS     Row Name 12/14/19 1000          Relationship/Environment    Lives With  alone  -BS     Row Name 12/14/19 1000          Resource/Environmental Concerns    Current Living Arrangements  other (see comments) Per patient and daughter he was set to move into an assisted living facility here in [place], [place].   -BS     Row Name 12/14/19 1000          Home Main Entrance    Number of Stairs, Main Entrance  none  -BS     Stair Railings, Main Entrance  none  -BS     Row Name 12/14/19 1000          Cognitive Assessment/Intervention- PT/OT    Orientation Status (Cognition)  oriented x 3  -BS     Row Name 12/14/19 1000          Safety Issues, Functional Mobility    Impairments Affecting Function (Mobility)  balance;endurance/activity tolerance;shortness of breath;strength  -BS     Comment, Safety Issues/Impairments (Mobility)  Per patient and daughter reports steady decline in function and activity tolerance for last several months.   -BS       User Key  (r) = Recorded By, (t) = Taken By, (c) = Cosigned By    Initials Name Provider Type    BS Raudel Nelson, PT Physical Therapist        Mobility     Row Name 12/14/19 1000          Bed Mobility Assessment/Treatment    Bed Mobility Assessment/Treatment  rolling left;supine-sit  -BS     Rolling Left [place] (Bed Mobility)  contact guard  -BS      Supine-Sit [place] (Bed Mobility)  contact guard  -     Assistive Device (Bed Mobility)  bed rails;head of bed elevated  -     Row Name 12/14/19 1000          Bed-Chair Transfer    Bed-Chair [place] (Transfers)  contact guard;1 person assist  -     Assistive Device (Bed-Chair Transfers)  other (see comments) Hand Held Assist.   -     Row Name 12/14/19 1000          Sit-Stand Transfer    Sit-Stand [place] (Transfers)  contact guard Used back of legs against bed for support.   -     Assistive Device (Sit-Stand Transfers)  -- hand held  -     Row Name 12/14/19 1000          Gait/Stairs Assessment/Training    Distance in Feet (Gait)  -- Patient only agreeable to bed to chair transfer.   -       User Key  (r) = Recorded By, (t) = Taken By, (c) = Cosigned By    Initials Name Provider Type    Raudel Handley, PT Physical Therapist        Obj/Interventions     Row Name 12/14/19 1000          General ROM    GENERAL ROM COMMENTS  [facility] Name 12/14/19 1000          MMT (Manual Muscle Testing)    General MMT Comments  Right stronger than left UE/LE.   -     Row Name 12/14/19 1000          Static Sitting Balance    Level of [place] (Unsupported Sitting, Static Balance)  independent  -BS     Time Able to Maintain Position (Unsupported Sitting, Static Balance)  more than 5 minutes  -     Row Name 12/14/19 1000          Dynamic Sitting Balance    Level of [place], Reaches Outside Midline (Sitting, Dynamic Balance)  supervision;contact guard assist;1 person to manage equipment  -     Row Name 12/14/19 1000          Static Standing Balance    Level of [place] (Supported Standing, Static Balance)  contact guard assist;supervision;1 person assist  -BS     Time Able to Maintain Position (Supported Standing, Static Balance)  1 to 2 minutes  -     Assistive Device Utilized (Supported Standing, Static Balance)  other (see comments) hand held assist.   -     Row Name  12/14/19 1000          Dynamic Standing Balance    Level of [place], Reaches Outside Midline (Standing, Dynamic Balance)  contact guard assist;1 person assist  -BS     Time Able to Maintain Position, Reaches Outside Midline (Standing, Dynamic Balance)  1 to 2 minutes  -BS     Assistive Device Utilized (Supported Standing, Dynamic Balance)  -- hand held assist  -BS     Row Name 12/14/19 1000          Sensory Assessment/Intervention    Sensory General Assessment  no sensation deficits identified  -BS       User Key  (r) = Recorded By, (t) = Taken By, (c) = Cosigned By    Initials Name Provider Type    BS Raudel Nelson, PT Physical Therapist        Goals/Plan     Row Name 12/14/19 1000          Bed Mobility Goal 1 (PT)    Activity/Assistive Device (Bed Mobility Goal 1, PT)  bed mobility activities, all  -BS     [place] Level/Cues Needed (Bed Mobility Goal 1, PT)  independent  -BS     Time Frame (Bed Mobility Goal 1, PT)  short term goal (STG);1 week  -BS     Row Name 12/14/19 1000          Transfer Goal 1 (PT)    Activity/Assistive Device (Transfer Goal 1, PT)  sit-to-stand/stand-to-sit;bed-to-chair/chair-to-bed  -BS     [place] Level/Cues Needed (Transfer Goal 1, PT)  conditional independence  -BS     Time Frame (Transfer Goal 1, PT)  2 weeks  -BS     Row Name 12/14/19 1000          Gait Training Goal 1 (PT)    Activity/Assistive Device (Gait Training Goal 1, PT)  gait (walking locomotion);assistive device use;improve balance and speed;other (see comments) with appropriate assistive device.   -BS     [place] Level (Gait Training Goal 1, PT)  conditional independence  -BS     Distance (Gait Goal 1, PT)  150   -BS     Time Frame (Gait Training Goal 1, PT)  long term goal (LTG)  -BS     Barriers (Gait Training Goal 1, PT)  COPD, Supp. O2.   -BS     Row Name 12/14/19 1000          Patient Education Goal (PT)    Activity (Patient Education Goal, PT)  Patient and family to be agreeable to PT POC, D/C  recommendations and HEP.   -BS     [place]/Cues/Accuracy (Memory Goal 2, PT)  demonstrates adequately;independent;verbalizes understanding  -BS     Time Frame (Patient Education Goal, PT)  long term goal (LTG);2 weeks  -BS     Barriers (Patient Education Goal, PT)  None  -BS       User Key  (r) = Recorded By, (t) = Taken By, (c) = Cosigned By    Initials Name Provider Type    BS Raudel Nelson, PT Physical Therapist        Clinical Impression     Row Name 12/14/19 1000          Pain Assessment    Additional Documentation  Pain Scale: Numbers Pre/Post-Treatment (Group)  -BS     Row Name 12/14/19 1000          Pain Scale: Numbers Pre/Post-Treatment    Pain Scale: Numbers, Pretreatment  0/10 - no pain  -BS     Pain Scale: Numbers, Post-Treatment  0/10 - no pain  -BS     Row Name 12/14/19 1000          Physical Therapy Clinical Impression    Patient/Family Goals Statement (PT Clinical Impression)  Increased independence and activity tolerance.   -BS     Criteria for Skilled Interventions Met (PT Clinical Impression)  yes;treatment indicated  -BS     Rehab Potential (PT Clinical Summary)  fair, will monitor progress closely  -BS     Predicted Duration of Therapy (PT)  2 weeks   -BS     Row Name 12/14/19 1000          Vital Signs    Pre SpO2 (%)  92  -BS     O2 Delivery Pre Treatment  supplemental O2  -BS     Intra SpO2 (%)  92  -BS     O2 Delivery Intra Treatment  supplemental O2  -BS     Post SpO2 (%)  94  -BS     O2 Delivery Post Treatment  supplemental O2  -BS     Pre Patient Position  Supine  -BS     Intra Patient Position  Standing  -BS     Post Patient Position  Sitting  -BS       User Key  (r) = Recorded By, (t) = Taken By, (c) = Cosigned By    Initials Name Provider Type    Raudel Handley, PT Physical Therapist        Outcome Measures     Row Name 12/14/19 1000          How much help from another person do you currently need...    Turning from your back to your side while in flat bed without using  bedrails?  4  -BS     Moving from lying on back to sitting on the side of a flat bed without bedrails?  4  -BS     Moving to and from a bed to a chair (including a wheelchair)?  3  -BS     Standing up from a chair using your arms (e.g., wheelchair, bedside chair)?  3  -BS     Climbing 3-5 steps with a railing?  2  -BS     To walk in hospital room?  3  -BS     AM-PAC 6 Clicks Score (PT)  19  -BS     Row Name 12/14/19 1000          Functional Assessment    Outcome Measure Options  AM-PAC 6 Clicks Basic Mobility (PT)  -BS       User Key  (r) = Recorded By, (t) = Taken By, (c) = Cosigned By    Initials Name Provider Type    Raudel Handley, WILFRED Physical Therapist          PT Recommendation and Plan     Outcome Summary/Treatment Plan (PT)  Anticipated Discharge Disposition (PT): home with home health, assisted living facility ([facility]), home  Plan of Care Reviewed With: patient, daughter  Outcome Summary: PT evaluation complete with patient AxO x 4 with bed mobility and bed to chair transfer complete. Per daughter patient was scheduled to move into an AL here in [place] in near future.     Time Calculation:   PT Charges     Row Name 12/14/19 1000             Time Calculation    Start Time  0935  -BS      Stop Time  1000  -BS      Time Calculation (min)  25 min  -BS      PT Received On  12/14/19  -BS      PT Goal Re-Cert Due Date  12/28/19  -BS         PT/SLP KX Modifier    Exception criteria met to exceed therapy cap  Apply KX Modifier  -BS        User Key  (r) = Recorded By, (t) = Taken By, (c) = Cosigned By    Initials Name Provider Type    Raudel Handley, WILFRED Physical Therapist        Therapy Charges for Today     Code Description Service Date Service Provider Modifiers Qty    32700888013 HC PT EVAL LOW COMPLEXITY 2 12/14/2019 Raudel Nelson, WILFRED GP, KX 1          PT G-Codes  Outcome Measure Options: AM-PAC 6 Clicks Basic Mobility (PT)  AM-PAC 6 Clicks Score (PT): 19    Raudel Nelson PT  12/14/2019

## 2019-12-14 NOTE — PROGRESS NOTES
Hospitalist Progress Note.    LOS: 1 day    Patient Care Team:  Miguel Rojas MD as PCP - General  Miguel Rojas MD as PCP - Family Medicine  Ulysses Bass MD as Cardiologist (Cardiology)    Chief Complaint:    Follow-up on acute urinary tract infection, follow-up metabolic encephalopathy    SUBJECTIVE:  Patient seen and examined this morning.  Events from last night noted.  He is alert and oriented this morning and is able to answer questions appropriately.  He continues to have a significant cough but denies any significant shortness of breath.  He does report some dysuria and increased frequency.  He reports a decline in his functional status over the last few weeks.  He is currently getting ready to move into a assisted living facility.  He is using a wheelchair to go outside his house but tends to use a walker while he is in the house.     Patient is a very pleasant 69-year-old male with medical history of advanced COPD on 4 L of oxygen via nasal cannula at home BiPAP with chronic diastolic heart failure who presented to the ER on 12/13/2019 with complaints of progressive worsening cough and congestion with exposure to recent strep.  He also reported increased urinary frequency and dysuria.  His vitals were relatively stable on admission.  Labs are fairly unremarkable with exception of proBNP of 2393 and BUN/creatinine 46/2.3 with baseline creatinine of 2.  Urinalysis suggestive of an UTI with white blood cells and positive leukocyte esterase.  His blood gas was fairly unremarkable with the exception of CO2 of 61 with baseline around 50s.  He was started on IV Rocephin, Solu-Medrol and admitted to the hospitalist service for COPD exacerbation and possible UTI.    Review of Systems:    The pertinent  ROS was done and it is noted above, rest  was negative.    OBJECTIVE:    Vital Signs  /67 (BP Location: Right arm, Patient Position: Sitting)   Pulse 84   Temp 98.5 °F (36.9 °C) (Oral)   Resp 18   Ht  "172.7 cm (67.99\")   Wt 77.3 kg (170 lb 6.4 oz)   SpO2 98%   BMI 25.92 kg/m²       I/O this shift:  In: 500 [P.O.:500]  Out: 200 [Urine:200]    Intake/Output Summary (Last 24 hours) at 12/14/2019 1419  Last data filed at 12/14/2019 1300  Gross per 24 hour   Intake 1200 ml   Output 1100 ml   Net 100 ml       Physical Exam:    General Appearance: alert, oriented x 3, no acute distress, pleasant  HEENT: pupils round and reactive to light, oral mucosa dry, extraocular movements intact.  Neck: supple, no JVD, trachea midline  Lungs: fair air entry bilaterally, mild diffuse expiratory wheezing with occasional rhonchi  Heart: RRR, normal S1 and S2, no S3, no rub  Abdomen: soft, non-tender, no palpable bladder, present bowel sounds to auscultation  Extremities: no edema, cyanosis or clubbing.   Neuro: normal speech and mental status, grossly non focal.  Skin: diffuse ecchymoses and bruising on bilateral upper and lower extremities from multiple falls     Results Review:    Results from last 7 days   Lab Units 12/14/19  0612 12/13/19  1838   SODIUM mmol/L 139 141   POTASSIUM mmol/L 4.6 4.3   CHLORIDE mmol/L 95* 94*   CO2 mmol/L 32.4* 33.0*   BUN mg/dL 42* 46*   CREATININE mg/dL 2.14* 2.31*   CALCIUM mg/dL 9.4 9.5   BILIRUBIN mg/dL  --  0.8   ALK PHOS U/L  --  75   ALT (SGPT) U/L  --  12   AST (SGOT) U/L  --  24   GLUCOSE mg/dL 184* 109*       Estimated Creatinine Clearance: 35.6 mL/min (A) (by C-G formula based on SCr of 2.14 mg/dL (H)).                Results from last 7 days   Lab Units 12/14/19  0715 12/13/19  1838   WBC 10*3/mm3 5.53 8.00   HEMOGLOBIN g/dL 9.6* 9.7*   PLATELETS 10*3/mm3 193 189               Imaging Results (Last 24 Hours)     Procedure Component Value Units Date/Time    XR Chest 2 View [[id number]] Collected:  12/13/19 1914     Updated:  12/13/19 1915    Narrative:       FINAL REPORT    CLINICAL HISTORY:  . cough, soa, confusion    FINDINGS:  Interstitial prominence is consistent with chronic lung " disease.  There is no acute infiltrate, suspicious mass or pleural  effusion.  Heart size within normal limits.  Fullness of the AP  window is nonspecific.  Aortic knob is calcified.  Pacemaker and  lower cervical neural stimulators present.      Impression:       Chronic changes but no acute disease    Authenticated by Geovany Mullins MD on 12/13/2019 07:14:37 PM          amLODIPine 5 mg Oral Q24H   aspirin 81 mg Oral Daily   atorvastatin 10 mg Oral Nightly   budesonide 0.5 mg Nebulization BID - RT   clopidogrel 75 mg Oral Daily   enoxaparin 40 mg Subcutaneous Nightly   escitalopram 20 mg Oral Daily   finasteride 5 mg Oral Daily   furosemide 20 mg Oral Daily   guaiFENesin 600 mg Oral BID   ipratropium-albuterol 3 mL Nebulization Q6H - RT   [START ON 12/15/2019] levoFLOXacin 250 mg Intravenous Q24H   lidocaine 1 patch Transdermal Q24H   lisinopril 5 mg Oral Daily   melatonin 5 mg Oral Nightly   methylPREDNISolone sodium succinate 80 mg Intravenous Q8H   metoprolol tartrate 25 mg Oral BID   pantoprazole 40 mg Oral Daily   sodium chloride 10 mL Intravenous Q12H   tamsulosin 0.4 mg Oral Nightly   traZODone 50 mg Oral Nightly          Medication Review:   Current Facility-Administered Medications   Medication Dose Route Frequency Provider Last Rate Last Dose   • acetaminophen (TYLENOL) tablet 650 mg  650 mg Oral Q4H PRN Kaylee Michael DO   650 mg at 12/14/19 0443    Or   • acetaminophen (TYLENOL) 160 MG/5ML solution 650 mg  650 mg Oral Q4H PRN Kaylee Michael DO        Or   • acetaminophen (TYLENOL) suppository 650 mg  650 mg Rectal Q4H PRN Kaylee Michael DO       • amLODIPine (NORVASC) tablet 5 mg  5 mg Oral Q24H Kaylee Michael, DO   5 mg at 12/14/19 0816   • aspirin EC tablet 81 mg  81 mg Oral Daily Kaylee Michael DO   81 mg at 12/14/19 0817   • atorvastatin (LIPITOR) tablet 10 mg  10 mg Oral Nightly Kaylee Michael, DO   10 mg at 12/13/19 2314   • benzonatate (TESSALON) capsule 100 mg  100 mg Oral TID PRN  Kaylee Michael DO   100 mg at 12/14/19 0816   • bisacodyl (DULCOLAX) suppository 10 mg  10 mg Rectal Daily PRN Kaylee Michael DO       • budesonide (PULMICORT) nebulizer solution 0.5 mg  0.5 mg Nebulization BID - RT Kaylee Michael, DO   0.5 mg at 12/14/19 0639   • clopidogrel (PLAVIX) tablet 75 mg  75 mg Oral Daily Kaylee Michael DO   75 mg at 12/14/19 0817   • cyclobenzaprine (FLEXERIL) tablet 10 mg  10 mg Oral TID PRN Kaylee Michael DO       • enoxaparin (LOVENOX) syringe 40 mg  40 mg Subcutaneous Nightly Kaylee Michael DO   40 mg at 12/13/19 2314   • escitalopram (LEXAPRO) tablet 20 mg  20 mg Oral Daily Kaylee Michael DO   20 mg at 12/14/19 0817   • finasteride (PROSCAR) tablet 5 mg  5 mg Oral Daily Kaylee Michael DO   5 mg at 12/14/19 0817   • furosemide (LASIX) tablet 20 mg  20 mg Oral Daily Kaylee Michael DO   20 mg at 12/14/19 0816   • guaiFENesin (MUCINEX) 12 hr tablet 600 mg  600 mg Oral BID Kaylee Michael DO   600 mg at 12/14/19 0816   • ipratropium-albuterol (DUO-NEB) nebulizer solution 3 mL  3 mL Nebulization Q6H - RT Kaylee Michael DO   3 mL at 12/14/19 1249   • ipratropium-albuterol (DUO-NEB) nebulizer solution 3 mL  3 mL Nebulization Q4H PRN Kaylee Michael DO       • [START ON 12/15/2019] levoFLOXacin (LEVAQUIN) 250 mg/50 mL D5W (premix) 250 mg  250 mg Intravenous Q24H Kaylee Michael DO       • lidocaine (LIDODERM) 5 % 1 patch  1 patch Transdermal Q24H Kaylee Michael DO   1 patch at 12/14/19 0815   • lisinopril (PRINIVIL,ZESTRIL) tablet 5 mg  5 mg Oral Daily Kaylee Michael, DO   5 mg at 12/14/19 0816   • melatonin tablet 5 mg  5 mg Oral Nightly Kaylee Michael, DO   5 mg at 12/13/19 2314   • methylPREDNISolone sodium succinate (SOLU-Medrol) injection 80 mg  80 mg Intravenous Q8H Kaylee Michael, DO   80 mg at 12/14/19 0816   • metoprolol tartrate (LOPRESSOR) tablet 25 mg  25 mg Oral BID Kaylee Michael, DO   25 mg at 12/14/19 0817   • pantoprazole (PROTONIX) EC tablet 40 mg   40 mg Oral Daily Kaylee Michael, DO   40 mg at 12/14/19 0817   • polyethylene glycol (MIRALAX) packet 17 g  17 g Oral Daily PRN Kaylee Michael, DO       • promethazine (PHENERGAN) tablet 12.5 mg  12.5 mg Oral Q6H PRN Kaylee Michael, DO        Or   • promethazine (PHENERGAN) injection 12.5 mg  12.5 mg Intramuscular Q6H PRN Kaylee Michael G, DO        Or   • promethazine (PHENERGAN) injection 12.5 mg  12.5 mg Intravenous Q6H PRN Kaylee Michael G, DO        Or   • promethazine (PHENERGAN) suppository 12.5 mg  12.5 mg Rectal Q6H PRN Kaylee Michael G, DO       • sodium chloride 0.9 % flush 10 mL  10 mL Intravenous PRN Kaylee Michael G, DO       • sodium chloride 0.9 % flush 10 mL  10 mL Intravenous Q12H Kaylee Michael, DO   10 mL at 12/14/19 0817   • sodium chloride 0.9 % flush 10 mL  10 mL Intravenous PRN Kaylee Michael, DO       • tamsulosin (FLOMAX) 24 hr capsule 0.4 mg  0.4 mg Oral Nightly Kaylee Michael, DO   0.4 mg at 12/13/19 2314   • traZODone (DESYREL) tablet 50 mg  50 mg Oral Nightly Kaylee Michael, DO   50 mg at 12/13/19 2314       ASSESSMENT/PLAN:    Acute on chronic respiratory failure with hypercapnia (CMS/HCC)    Atrial fibrillation (CMS/HCC)    Chronic kidney disease    Steroid-dependent COPD (CMS/HCC)    Essential hypertension    PEDRO on CPAP    COPD with exacerbation (CMS/HCC)    Impaired mobility and ADLs    Chronic diastolic congestive heart failure (CMS/HCC)    Cardiac pacemaker in situ    1. Acute on chronic hypercapnic respiratory failure, prior to admission, related to #2  2. Acute COPD exacerbation possible acute bronchitis  3. Dehydration, prior to admission with syncope at home yesterday reported, likely vasovagal, with reduced oral intake  4. Debility  5. Anemia chronic of chronic kidney disease  6. CKD  7. Chronic hypoxic respiratory failure  8. Acute metabolic encephalopathy, related to respiratory failure, without sepsis  9.  Acute urinary tract infection    Patient is hemodynamically  stable this morning.  He is currently on 4 L of oxygen which is what he uses at home.  His urine culture and blood cultures are pending at this time.  He will be continued on ceftriaxone for UTI I do not think that he has an active lower respiratory infection at this time.  He does appear to have an acute COPD exacerbation for which I will continue IV and nebulized steroids.  Patient appears to be significantly debilitated.  He currently lives alone but is making preparations to live in an assisted living facility.  Case management is aware of this.  I will get PT and OT to see him while he is here.    Details were discussed with the patient as well as family in the room.   I also discussed the details with the nursing staff.    Rest as ordered.    Ottoniel Hill MD  12/14/19  2:19 PM    Please note that portions of this note may have been completed with a voice recognition program. Efforts were made to edit the dictations, but occasionally words are mistranscribed.

## 2019-12-14 NOTE — PROGRESS NOTES
Adult Nutrition  Assessment/PES    Patient Name:  Jani Pena  YOB: 1950  MRN: [id number]  Admit Date:  12/13/2019    Assessment Date:  12/14/2019    Comments:    Recommend:  1. Continue current diet order as medically appropriate and tolerated.  2. Encourage PO intake. PO intake average ~0% x 1 meal.  3. RD ordered Novasource Renal daily and Boost Pudding BID.  4. Consider a renal multivitamin with minerals daily.     RD to follow pt and available PRN.      Reason for Assessment     Row Name 12/14/19 0957          Reason for Assessment    Reason For Assessment  diagnosis/disease state     Diagnosis  diabetes diagnosis/complications;renal disease;pulmonary disease           Anthropometrics     Row Name 12/14/19 0500          Anthropometrics    Weight  77.3 kg (170 lb 6.4 oz)         Labs/Tests/Procedures/Meds     Row Name 12/14/19 0958          Labs/Procedures/Meds    Lab Results Reviewed  reviewed, pertinent     Lab Results Comments  Low: Cl- High: Gluc, BUN, Cr        Medications    Pertinent Medications Reviewed  reviewed     Pertinent Medications Comments  Solu-medrol           Estimated/Assessed Needs     Row Name 12/14/19 0959          Calculation Measurements    Weight Used For Calculations  77.3 kg (170 lb 6.7 oz) Actual BW        Estimated/Assessed Needs    Additional Documentation  Fluid Requirements (Group);[place]-St. Jeor Equation (Group);Protein Requirements (Group);Calorie Requirements (Group)        Calorie Requirements    Estimated Calorie Need Method  [place]-St Jeor     Estimated Calorie Requirement Comment  2000 - 2200 AF 1.3        [place]-St. Jeor Equation    RMR ([place]-St. Jeor Equation)  1512.373        Protein Requirements    Weight Used For Protein Calculations  77.3 kg (170 lb 6.7 oz) Actual BW     Est Protein Requirement Amount (gms/kg)  0.8 gm protein 47 - 61 gm     Estimated Protein Requirements (gms/day)  61.84        Fluid Requirements    Estimated Fluid  Requirement Method  [place]-Segar Formula     [place]-Segar Method (over 20 kg)  3046         Nutrition Prescription Ordered     Row Name 12/14/19 1000          Nutrition Prescription PO    Current PO Diet  Soft Texture     Texture  Other (comment) Pureed meats     Common Modifiers  Cardiac         Evaluation of Received Nutrient/Fluid Intake     Row Name 12/14/19 0959          PO Evaluation    Number of Days PO Intake Evaluated  1 day     Number of Meals  1     % PO Intake  0               Problem/Interventions:  Problem 1     Row Name 12/14/19 1001          Nutrition Diagnoses Problem 1    Problem 1  Inadequate Intake/Infusion     Inadequate Intake Type  Oral     Macronutrient  Kcal;Fiber;Protein;Fat;Fluid;Carbohydrate     Etiology (related to)  Factors Affecting Nutrition     Food Habit/Preferences  Small Meals     Signs/Symptoms (evidenced by)  PO Intake     Percent (%) intake recorded  1 %     Over number of meals  0         Problem 2     Row Name 12/14/19 1001          Nutrition Diagnoses Problem 2    Problem 2  Impaired Nutrient Utilization     Etiology (related to)  Medical Diagnosis     Endocrine  DM Type 2     Renal  CKD     Signs/Symptoms (evidenced by)  Biochemical     Specific Labs Noted  Glucose;BUN;Creatinine             Intervention Goal     Row Name 12/14/19 1002          Intervention Goal    General  Meet nutritional needs for age/condition     PO  Meet estimated needs;Increase intake;Establish PO;PO intake (%)     PO Intake %  50 %     Weight  Maintain weight         Nutrition Intervention     Row Name 12/14/19 1002          Nutrition Intervention    RD/Tech Action  Follow Tx progress;Encourage intake;Recommend/ordered     Recommended/Ordered  Supplement         Nutrition Prescription     Row Name 12/14/19 1002          Nutrition Prescription PO    PO Prescription  Other (comment);Begin/change supplement Continue current diet order as medically appropriate and tolerated     Supplement  Nova  Renal;Boost Pudding     Supplement Frequency  2 times a day     New PO Prescription Ordered?  No, recommended        Other Orders    Obtain Weight  Daily     Obtain Weight Ordered?  No, recommended     Supplement  Vitamin mineral supplement Renal MVI     Supplement Ordered?  No, recommended         Education/Evaluation     Row Name 12/14/19 1003          Education    Education  Will Instruct as appropriate        Monitor/Evaluation    Monitor  Per protocol;I&O;PO intake;Supplement intake;Pertinent labs;Weight;Skin status           Electronically signed by:  Fabiana Jonas RD  12/14/19 10:04 AM

## 2019-12-14 NOTE — PLAN OF CARE
PT evaluation complete with patient AxO x 4 with bed mobility and bed to chair transfer complete. Per daughter patient was scheduled to move into an AL here in [place] in near future.

## 2019-12-14 NOTE — PROGRESS NOTES
Discharge Planning Assessment  [facility]     Patient Name: Jani Pena  MRN: [id number]  Today's Date: 12/14/2019    Admit Date: 12/13/2019    Discharge Needs Assessment     Row Name 12/14/19 1511       Living Environment    Lives With  alone    Name(s) of Who Lives With Patient  Pt lives alone but is going to the [facility] when he is discharged from here     Current Living Arrangements  home/apartment/condo    Primary Care Provided by  self    Provides Primary Care For  no one    Family Caregiver if Needed  none    Family Caregiver Names  Pt is going into an assisted living facility [facility]    Quality of Family Relationships  supportive    Able to Return to Prior Arrangements  other (see comments) See above comments       Resource/Environmental Concerns    Resource/Environmental Concerns  none       Transition Planning    Patient/Family Anticipates Transition to  other (see comments) Pt states he is already moved into the [facility] and will go upon discharge    Transportation Anticipated  family or friend will provide       Discharge Needs Assessment    Readmission Within the Last 30 Days  no previous admission in last 30 days    Equipment Currently Used at Home  rollator;oxygen;commode;bath bench;nebulizer;wheelchair Pt on O2 @4Lper [place] continuously with [employer] manageing     Discharge Facility/Level of Care Needs  assisted living facility Pt is going to assisted living facility [facility] upon disharge        Discharge Plan     Row Name 12/14/19 9747       Plan    Plan  Spoke with pt about discharge plans Pt states he plans on going to assisted living facility upon discharge ([facility])  Pt states he has already moved his furniture in    Confirmed his new address and contact numbers  April Easton, daughter [phone] and Jean Marie Pena, brother [phone] who is also his financial POA  Pt states he is his own medical   Pt states he is a DNR   Pt states he can do ADL when healthy pretty good   PCP Dr Rojas     DME standard wheelchair, rollator, O2@4Lper [place] continuously and managed by [employer], BSC and shower chair  Pt states he has used [employer] in the past and would use them in the future when needed   Will continue to assess pt for any further needs prior to being discharged         Destination      Coordination has not been started for this encounter.      Durable Medical Equipment      Coordination has not been started for this encounter.      Dialysis/Infusion      Coordination has not been started for this encounter.      Home Medical Care      Coordination has not been started for this encounter.      Therapy      Coordination has not been started for this encounter.      Community Resources      Coordination has not been started for this encounter.        Expected Discharge Date and Time     Expected Discharge Date Expected Discharge Time    Dec 16, 2019         Demographic Summary     Row Name 12/14/19 1509       General Information    Admission Type  inpatient    Arrived From  emergency department    Referral Source  admission list    Reason for Consult  discharge planning    Preferred Language  English       Contact Information    Permission Granted to Share Info With      Contact Information Obtained for          Functional Status     Row Name 12/14/19 1509       Functional Status    Usual Activity Tolerance  good       Functional Status, IADL    Medications  independent    Meal Preparation  independent    Housekeeping  independent    Laundry  independent    Shopping  independent       Employment/    Employment Status  retired        Psychosocial    No documentation.       Abuse/Neglect    No documentation.       Legal    No documentation.       Substance Abuse    No documentation.       Patient Forms    No documentation.           Lissett Johansen, RN

## 2019-12-14 NOTE — H&P
[facility] HOSPITALIST   HISTORY AND PHYSICAL      Name:  Jani Pena   Age:  69 y.o.  Sex:  male  :  1950  MRN:  [id number]   Visit Number:  [id number]  Admission Date:  2019  Date Of Service:  19  Primary Care Physician:  Miguel Rojas MD    Chief Complaint: Shortness of breath        History Of Presenting Illness: The patient is a 69-year-old very pleasant gentleman with COPD on chronic 4 L nasal cannula oxygen and home BiPAP with chronic diastolic CHF..  He is known to me as the father of a friend.  He presents to the emergency room with progressively worsening cough with exposure to strep, increased wheezing and shortness of breath with thick yellow sputum.  He feels like he is progressively worsened even at rest and is having difficulty ambulating alone in his home.  With cough he also has increased urinary frequency and feels like his bladder is not emptying correctly.  He had some mild right flank pain and had recently reportedly passed out last night at home.  He had bruising and abrasions to his arms and legs.    In the ER, chest x-ray showed chronicEmphysematous changes with no obvious infiltrate.  His temperature was 99.3.  White blood cell 8, hemoglobin 9.7, platelet 189, B natruretic peptide 2393, lactic acid 1.3, procalcitonin 0.18, glucose 109, BUN 46, creatinine 2.31(2.0), sodium 141, potassium 4.3, chloride 94, CO2 33.  He states he took a home urine test which was positive for urinary tract infection.  ABG showed pH 7.38/PCO2 61.8 (50s baseline)/PO2 93/bicarb 36.8.  Troponin was 0.030.  He was initiated on Rocephin 1 g, Solu-Medrol, and 500 cc bolus. Urinalysis showed WBC 13-20 with small leukocytes. He was admitted to the hospitalist service for further evaluation and treatment of COPD exacerbation with possible UTI.        Review Of Systems:     General ROS: Patient denies any fevers, chills or loss of consciousness. Complains of generalized weakness  and fatigue.   Psychological ROS: Denies any hallucinations and delusions.  Ophthalmic ROS: Denies any diplopia or transient loss of vision.  ENT ROS: Denies sore throat, nasal congestion or ear pain.   Allergy and Immunology ROS: Denies rash or itching.  Hematological and Lymphatic ROS: Denies neck swelling or easy bleeding.  Endocrine ROS: Denies any recent unintentional weight gain or loss.  Breast ROS: Denies any pain or swelling.  Respiratory ROS: Positive cough and wheezing and shortness of breath.   Cardiovascular ROS: Denies chest pain or palpitations. No history of exertional chest pain.  Gastrointestinal ROS: Denies nausea and vomiting. Denies any abdominal pain. No diarrhea.  Genito-Urinary ROS: Positive dysuria and fullness without hematuria.  Musculoskeletal ROS:Denies back pain. No muscle pain. No calf pain.   Neurological ROS: Denies any focal weakness. No loss of consciousness. Denies any numbness. Denies neck pain.   Dermatological ROS: Denies any redness or pruritis. Positive bruising on arms       Past Medical History:reviewed with patient today    Past Medical History:   Diagnosis Date   • Abnormal liver enzymes    • Anemia    • Anxiety    • Arthritis    • Atherosclerotic heart disease of native coronary artery without angina pectoris    • Atrial fibrillation (CMS/HCC)    • Back pain    • BPH (benign prostatic hyperplasia)    • Cataract, bilateral    • Chest pain     2-3 MONTHS AGO.  PER PT, HAS ADDRESSED WITH CARDIOLOGIST.  STATES HAS NTG PRN.   • CHF (congestive heart failure) (CMS/HCC)    • Chronic bronchitis (CMS/HCC)    • Chronic kidney disease    • COPD (chronic obstructive pulmonary disease) (CMS/HCC)    • Degeneration of cervical intervertebral disc    • Depression    • Diverticulosis    • Dyspnea    • Esophageal reflux    • Esophagitis    • Gastritis    • Hematuria    • Hemorrhoids    • Hiatal hernia    • History of arthritis    • History of nuclear stress test     UNSURE OF DATE   •  History of peripheral neuropathy    • History of ventricular septal defect    • History of ventricular septal defect    • Hyperlipidemia    • Hypertension    • Impaired functional mobility, balance, gait, and endurance    • Infectious diarrhea    • Kyphosis, acquired    • Lumbar radiculopathy    • Mitral and aortic valve disease    • Nephrolithiasis    • Phimosis    • Problems with swallowing     WITH FOOD   • Respiratory failure (CMS/HCC)    • Sleep apnea     BIPAP   • TIA (transient ischemic attack)     X3.   2014   • Ventral hernia    • Wears glasses     FOR READING       Past Surgical history:reviewed with patient today    Past Surgical History:   Procedure Laterality Date   • CARDIAC CATHETERIZATION     • CARDIAC CATHETERIZATION N/A 5/24/2018    Procedure: Left Heart Cath;  Surgeon: Edouard Robertson MD;  Location:  [facility]ASIVE [facility];  Service: Cardiovascular   • CARDIAC ELECTROPHYSIOLOGY PROCEDURE N/A 1/31/2019    Procedure: PACEMAKER IMPLANTATION- DC;  Surgeon: Omar Encinas DO;  Location:  [facility] EP INVASIVE LOCATION;  Service: Cardiology   • ENDOSCOPY N/A 1/26/2019    Procedure: ESOPHAGOGASTRODUODENOSCOPY;  Surgeon: Brunner, Mark I, MD;  Location:  [facility] ENDOSCOPY;  Service: Gastroenterology   • HERNIA REPAIR     • ORTHOPEDIC SURGERY Left     Left hip arthroplasty   • PACEMAKER IMPLANTATION  01/31/2019   • SUPRAPUBIC CATHETER INSERTION      History of Percutaneous Catheter Placement Into Ureter   • THROAT SURGERY      FOR SLEEP APNEA   • VSD REPAIR      History of VSD Repair By Patch Closure       Social History: former smoker. Denies alcohol, tobacco, drug use. Uses 4 L NC home and home bipap  Pediatric History   Patient Guardian Status   • Mother:  Minda Pena     Other Topics Concern   • Not on file   Social History Narrative   • Not on file       Family History:    Family History   Problem Relation Age of Onset   • Other Father         CABG   • Coronary artery disease Father         Allergies:      Mucomyst [acetylcysteine] and Sulfa antibiotics    Home Medications:    Prior to Admission Medications     Prescriptions Last Dose Informant Patient Reported? Taking?    alfuzosin (UROXATRAL) 10 MG 24 hr tablet   No No    Take 1 tablet by mouth Daily.    amLODIPine (NORVASC) 5 MG tablet   No No    Take 1 tablet by mouth Daily.    ASPIRIN ADULT LOW STRENGTH 81 MG EC tablet   No No    TAKE ONE TABLET BY MOUTH DAILY    atorvastatin (LIPITOR) 10 MG tablet   No No    Take 1 tablet by mouth Every Night.    carboxymethylcellulose (REFRESH PLUS) 0.5 % solution   No No    Administer 1 drop to both eyes 3 (Three) Times a Day As Needed for Dry Eyes.    clopidogrel (PLAVIX) 75 MG tablet   No No    Take 1 tablet by mouth Daily.    cyclobenzaprine (FLEXERIL) 5 MG tablet   No No    Take 1 tablet by mouth Every 8 (Eight) Hours.    docusate sodium 100 MG capsule   No No    Take 100 mg by mouth 2 (Two) Times a Day.    escitalopram (LEXAPRO) 20 MG tablet   No No    Take 1 tablet by mouth Daily.    finasteride (PROSCAR) 5 MG tablet   No No    Take 1 tablet by mouth Daily.    Fluticasone Furoate-Vilanterol (BREO ELLIPTA) 200-25 MCG/INH inhaler   No No    Inhale 1 puff Daily.    furosemide (LASIX) 20 MG tablet   No No    Take 1 tablet by mouth Daily.    ipratropium-albuterol (DUO-NEB) 0.5-2.5 mg/mL nebulizer   No No    Take 3 mL by nebulization Every 6 (Six) Hours.    lidocaine (LIDODERM) 5 %   No No    Place 1 patch on the skin as directed by provider Daily. Remove & Discard patch within 12 hours or as directed by MD    lisinopril (PRINIVIL,ZESTRIL) 5 MG tablet   No No    Take 1 tablet by mouth Daily.    melatonin 5 MG tablet tablet   No No    ONE BY MOUTH EVERY NIGHT AT BEDTIME    metoprolol tartrate (LOPRESSOR) 25 MG tablet   No No    TAKE ONE TABLET BY MOUTH TWICE A DAY    O2 (OXYGEN)  Self Yes No    Inhale 4 L/min 1 (One) Time. Walking on 4L and sitting 3L    pantoprazole (PROTONIX) 40 MG EC tablet   No No     TAKE ONE TABLET BY MOUTH DAILY    polyethylene glycol (MIRALAX) pack packet   No No    Take 17 g by mouth Daily As Needed (constipation).    predniSONE (DELTASONE) 10 MG tablet   No No    Take 1 tablet by mouth Daily With Breakfast.    traZODone (DESYREL) 50 MG tablet   No No    Take 1 tablet by mouth Every Night.             ED Medications:    Medications   sodium chloride 0.9 % flush 10 mL (has no administration in time range)   sodium chloride 0.9 % bolus 500 mL (0 mL Intravenous Stopped 12/13/19 1947)   cefTRIAXone (ROCEPHIN) IVPB 1 g/50ml dextrose (premix) (0 g Intravenous Stopped 12/13/19 2105)   methylPREDNISolone sodium succinate (SOLU-Medrol) injection 125 mg (125 mg Intravenous Given 12/13/19 2047)       Vital Signs:    Temp:  [98.4 °F (36.9 °C)-99.3 °F (37.4 °C)] 99 °F (37.2 °C)  Heart Rate:  [] 89  Resp:  [18-22] 18  BP: (121-158)/(64-77) 145/69      Intake/Output Summary (Last 24 hours) at 12/13/2019 2210  Last data filed at 12/13/2019 2105  Gross per 24 hour   Intake 600 ml   Output --   Net 600 ml           12/13/19 1820   Weight: 75.3 kg (166 lb)       Body mass index is 25.24 kg/m².    Physical Exam:      General Appearance:    Chronically ill appearing man. Cushings type faces. Alert and cooperative, no acute distress, oriented to person and place with very mild confusion   Head:    Atraumatic and normocephalic, without obvious defect.   Eyes:            PERRLA, conjunctivae and sclerae normal, no icterus. No pallor. Extraocular movements are within normal limits.   Ears:    Ears appear intact with no abnormalities noted.   Throat:   No oral lesions, no thrush, oral mucosa moist.   Neck:   Supple, trachea midline, no thyromegaly   Back:     No kyphoscoliosis present. No tenderness to palpation,   range of motion normal.   Lungs:     Chest shape is normal. Breath sounds heard bilaterally  With right lower lobe rhonchi without crackles. Positive expiratory trace wheezing. No Pleural rub or  bronchial breathing.      Heart:    Normal S1 and S2, no murmur, no gallop, no rub. No JVD. Pacemaker present   Abdomen:     Normal bowel sounds, no masses, no organomegaly. Soft        non-tender, non-distended, no guarding, no rebound                tenderness   Extremities:   Moves all extremities well, no edema, no cyanosis, no             Clubbing; diffuse muscle atrophy   Pulses:   Pulses palpable and equal bilaterally   Skin:   No bleeding,or rash. Positive bruising on arms   Lymph nodes:   No palpable adenopathy   Neurologic:   No tremor, sensation intact, Motor power is diffusely weak but  equal bilaterally arms and legs.       EKG:    Sinus  96    Labs:    Lab Results (last 24 hours)     Procedure Component Value Units Date/Time    Blood Gas, Arterial With Co-Ox [[id number]]  (Abnormal) Collected:  12/13/19 2002    Specimen:  Arterial Blood Updated:  12/13/19 2004     Site Left Radial     Mynor's Test Positive     pH, Arterial 7.383 pH units      pCO2, Arterial 61.8 mm Hg      Comment: 83 Value above reference range        pO2, Arterial 93.5 mm Hg      HCO3, Arterial 36.8 mmol/L      Comment: 83 Value above reference range        Base Excess, Arterial 10.0 mmol/L      Comment: 83 Value above reference range        O2 Saturation, Arterial 97.2 %      Hemoglobin, Blood Gas 9.8 g/dL      Comment: 84 Value below reference range        Hematocrit, Blood Gas 30.1 %      Comment: 84 Value below reference range        Oxyhemoglobin 95.1 %      Methemoglobin 1.30 %      Carboxyhemoglobin 0.9 %      A-a Gradiant --     Comment: UNABLE TO CALCULATE        Barometric Pressure for Blood Gas 729 mmHg      Modality Nasal Cannula     Flow Rate 4.0 lpm      Ventilator Mode NA     Comment: Meter: [id number]     :  [id number]        Note --     pH, Temp Corrected --     pCO2, Temperature Corrected --     pO2, Temperature Corrected --    Urinalysis, Microscopic Only - Urine, Clean Catch [[id number]]  (Abnormal)  Collected:  12/13/19 1938    Specimen:  Urine, Clean Catch Updated:  12/13/19 1953     RBC, UA 0-2 /HPF      WBC, UA 13-20 /HPF      Bacteria, UA Trace /HPF      Squamous Epithelial Cells, UA 0-2 /HPF      Hyaline Casts, UA 0-2 /LPF      Methodology Manual Light Microscopy    Urine Culture - Urine, Urine, Clean Catch [[id number]] Collected:  12/13/19 1938    Specimen:  Urine, Clean Catch Updated:  12/13/19 1953    Urinalysis With Culture If Indicated - Urine, Clean Catch [[id number]]  (Abnormal) Collected:  12/13/19 1938    Specimen:  Urine, Clean Catch Updated:  12/13/19 1946     Color, UA Yellow     Appearance, UA Clear     pH, UA 5.5     Specific Gravity, UA 1.009     Glucose, UA Negative     Ketones, UA Negative     Bilirubin, UA Negative     Blood, UA Negative     Protein, UA Negative     Leuk Esterase, UA Small (1+)     Nitrite, UA Negative     Urobilinogen, UA 0.2 E.U./dL    Troponin [[id number]]  (Abnormal) Collected:  12/13/19 1838    Specimen:  Blood Updated:  12/13/19 1945     Troponin T 0.034 ng/mL     Narrative:       Troponin T Reference Range:  <= 0.03 ng/mL-   Negative for AMI  >0.03 ng/mL-     Abnormal for myocardial necrosis.  Clinicians would have to utilize clinical acumen, EKG, Troponin and serial changes to determine if it is an Acute Myocardial Infarction or myocardial injury due to an underlying chronic condition.     Lipase [[id number]]  (Normal) Collected:  12/13/19 1838    Specimen:  Blood Updated:  12/13/19 1922     Lipase 18 U/L     Comprehensive Metabolic Panel [[id number]]  (Abnormal) Collected:  12/13/19 1838    Specimen:  Blood Updated:  12/13/19 1922     Glucose 109 mg/dL      BUN 46 mg/dL      Creatinine 2.31 mg/dL      Sodium 141 mmol/L      Potassium 4.3 mmol/L      Chloride 94 mmol/L      CO2 33.0 mmol/L      Calcium 9.5 mg/dL      Total Protein 6.7 g/dL      Albumin 4.00 g/dL      ALT (SGPT) 12 U/L      AST (SGOT) 24 U/L      Alkaline Phosphatase 75 U/L      Total Bilirubin 0.8  "mg/dL      eGFR Non African Amer 28 mL/min/1.73      Globulin 2.7 gm/dL      A/G Ratio 1.5 g/dL      BUN/Creatinine Ratio 19.9     Anion Gap 14.0 mmol/L     Narrative:       GFR Normal >60  Chronic Kidney Disease <60  Kidney Failure <15      BNP [[id number]]  (Abnormal) Collected:  12/13/19 1838    Specimen:  Blood Updated:  12/13/19 1917     proBNP 2,393.0 pg/mL     Narrative:       Among patients with dyspnea, NT-proBNP is highly sensitive for the detection of acute congestive heart failure. In addition NT-proBNP of <300 pg/ml effectively rules out acute congestive heart failure with 99% negative predictive value.      Procalcitonin [[id number]]  (Normal) Collected:  12/13/19 1838    Specimen:  Blood Updated:  12/13/19 1913     Procalcitonin 0.18 ng/mL     Narrative:       As a Marker for Sepsis (Non-Neonates):   1. <0.5 ng/mL represents a low risk of severe sepsis and/or septic shock.  1. >2 ng/mL represents a high risk of severe sepsis and/or septic shock.    As a Marker for Lower Respiratory Tract Infections that require antibiotic therapy:  PCT on Admission     Antibiotic Therapy             6-12 Hrs later  > 0.5                Strongly Recommended            >0.25 - <0.5         Recommended  0.1 - 0.25           Discouraged                   Remeasure/reassess PCT  <0.1                 Strongly Discouraged          Remeasure/reassess PCT      As 28 day mortality risk marker: \"Change in Procalcitonin Result\" (> 80 % or <=80 %) if Day 0 (or Day 1) and Day 4 values are available. Refer to http://www.[employer]-pct-calculator.com/   Change in PCT <=80 %   A decrease of PCT levels below or equal to 80 % defines a positive change in PCT test result representing a higher risk for 28-day all-cause mortality of patients diagnosed with severe sepsis or septic shock.  Change in PCT > 80 %   A decrease of PCT levels of more than 80 % defines a negative change in PCT result representing a lower risk for 28-day all-cause " mortality of patients diagnosed with severe sepsis or septic shock.                  Lactic Acid, Plasma [[id number]]  (Normal) Collected:  12/13/19 1838    Specimen:  Blood Updated:  12/13/19 1858     Lactate 1.3 mmol/L     CBC & Differential [[id number]] Collected:  12/13/19 1838    Specimen:  Blood Updated:  12/13/19 1845    Narrative:       The following orders were created for panel order CBC & Differential.  Procedure                               Abnormality         Status                     ---------                               -----------         ------                     CBC Auto Differential[[id number]]        Abnormal            Final result                 Please view results for these tests on the individual orders.    CBC Auto Differential [[id number]]  (Abnormal) Collected:  12/13/19 1838    Specimen:  Blood Updated:  12/13/19 1845     WBC 8.00 10*3/mm3      RBC 3.20 10*6/mm3      Hemoglobin 9.7 g/dL      Hematocrit 31.3 %      MCV 97.8 fL      MCH 30.3 pg      MCHC 31.0 g/dL      RDW 12.8 %      RDW-SD 45.8 fl      MPV 9.0 fL      Platelets 189 10*3/mm3      Neutrophil % 78.4 %      Lymphocyte % 9.8 %      Monocyte % 10.0 %      Eosinophil % 0.9 %      Basophil % 0.4 %      Immature Grans % 0.5 %      Neutrophils, Absolute 6.28 10*3/mm3      Lymphocytes, Absolute 0.78 10*3/mm3      Monocytes, Absolute 0.80 10*3/mm3      Eosinophils, Absolute 0.07 10*3/mm3      Basophils, Absolute 0.03 10*3/mm3      Immature Grans, Absolute 0.04 10*3/mm3      nRBC 0.0 /100 WBC           Radiology:    Imaging Results (Last 72 Hours)     Procedure Component Value Units Date/Time    XR Chest 2 View [[id number]] Collected:  12/13/19 1914     Updated:  12/13/19 1915    Narrative:       FINAL REPORT    CLINICAL HISTORY:  . cough, soa, confusion    FINDINGS:  Interstitial prominence is consistent with chronic lung disease.  There is no acute infiltrate, suspicious mass or pleural  effusion.  Heart size within normal limits.   Fullness of the AP  window is nonspecific.  Aortic knob is calcified.  Pacemaker and  lower cervical neural stimulators present.      Impression:       Chronic changes but no acute disease    Authenticated by Geovany Mullins MD on 12/13/2019 07:14:37 PM          Assessment:    Acute on chronic respiratory failure with hypercapnia (CMS/HCC)    1. Acute on chronic hypercapnic respiratory failure, prior to admission, related to #2  2. Acute COPD exacerbation with suspected pneumonia  3. Suspected right lower lobe pneumonia, not seen on admission while dehydrated; without sepsis  4. Dehydration, prior to admission with syncope at home yesterday reported, likely vasovagal, with reduced oral intake  5. Debility  6. Anemia chronic of chronic kidney disease  7. CKD  8. Chronic hypoxic respiratory failure  9. Acute metabolic encephalopathy, related to respiratory failure, without sepsis    Plan:    Admit to hospitalist service on telemetry with continuous pulsoximetry. Solumedrol, Mucinex. Continue Duonebs, Budesonide nebs. Titrate oxygen. Keep Bipap. Check daily labs and titrate. Give low dose iv fluids overnight. Get PT, OT consults. Blood cultures added. Continue Ceftriaxone, Levaquin. He thinks he most recently had been taking zithromax.     I anticipate 3 days inpatient stay will be needed for treatment. Patient is slightly confused so tomorrow, if daughter here, discuss code status further.     Also, his home medication list was not confirmed tonight, as patient didn't have a list or know his medications. Please re-reconcile tomorrow.     Medication risks and benefits were discussed in detail. Patient reported being satisfied with current treatment plan.    Kaylee Michael,   12/13/19  10:10 PM

## 2019-12-14 NOTE — PLAN OF CARE
Problem: NPPV/CPAP (Adult)  Goal: Signs and Symptoms of Listed Potential Problems Will be Absent, Minimized or Managed (NPPV/CPAP)  Outcome: Ongoing (interventions implemented as appropriate)  Flowsheets (Taken 12/14/2019 0129)  Problems Assessed (NPPV/CPAP): all  Problems Present (NPPV/CPAP): none

## 2019-12-15 LAB
ALBUMIN SERPL-MCNC: 3.7 G/DL (ref 3.5–5.2)
ANION GAP SERPL CALCULATED.3IONS-SCNC: 12.6 MMOL/L (ref 5–15)
BUN BLD-MCNC: 38 MG/DL (ref 8–23)
BUN/CREAT SERPL: 21.6 (ref 7–25)
CALCIUM SPEC-SCNC: 9.1 MG/DL (ref 8.6–10.5)
CHLORIDE SERPL-SCNC: 97 MMOL/L (ref 98–107)
CO2 SERPL-SCNC: 33.4 MMOL/L (ref 22–29)
CREAT BLD-MCNC: 1.76 MG/DL (ref 0.76–1.27)
DEPRECATED RDW RBC AUTO: 46.9 FL (ref 37–54)
ERYTHROCYTE [DISTWIDTH] IN BLOOD BY AUTOMATED COUNT: 12.9 % (ref 12.3–15.4)
GFR SERPL CREATININE-BSD FRML MDRD: 39 ML/MIN/1.73
GLUCOSE BLD-MCNC: 139 MG/DL (ref 65–99)
HCT VFR BLD AUTO: 30.2 % (ref 37.5–51)
HGB BLD-MCNC: 9.3 G/DL (ref 13–17.7)
MCH RBC QN AUTO: 30.6 PG (ref 26.6–33)
MCHC RBC AUTO-ENTMCNC: 30.8 G/DL (ref 31.5–35.7)
MCV RBC AUTO: 99.3 FL (ref 79–97)
PHOSPHATE SERPL-MCNC: 3.8 MG/DL (ref 2.5–4.5)
PLATELET # BLD AUTO: 210 10*3/MM3 (ref 140–450)
PMV BLD AUTO: 9.3 FL (ref 6–12)
POTASSIUM BLD-SCNC: 4.1 MMOL/L (ref 3.5–5.2)
RBC # BLD AUTO: 3.04 10*6/MM3 (ref 4.14–5.8)
SODIUM BLD-SCNC: 143 MMOL/L (ref 136–145)
WBC NRBC COR # BLD: 10.69 10*3/MM3 (ref 3.4–10.8)

## 2019-12-15 PROCEDURE — 97116 GAIT TRAINING THERAPY: CPT

## 2019-12-15 PROCEDURE — 25010000002 LEVOFLOXACIN PER 250 MG: Performed by: FAMILY MEDICINE

## 2019-12-15 PROCEDURE — 85027 COMPLETE CBC AUTOMATED: CPT | Performed by: INTERNAL MEDICINE

## 2019-12-15 PROCEDURE — 94660 CPAP INITIATION&MGMT: CPT

## 2019-12-15 PROCEDURE — 80069 RENAL FUNCTION PANEL: CPT | Performed by: INTERNAL MEDICINE

## 2019-12-15 PROCEDURE — 94799 UNLISTED PULMONARY SVC/PX: CPT

## 2019-12-15 PROCEDURE — 25010000002 ENOXAPARIN PER 10 MG: Performed by: FAMILY MEDICINE

## 2019-12-15 PROCEDURE — 99232 SBSQ HOSP IP/OBS MODERATE 35: CPT | Performed by: INTERNAL MEDICINE

## 2019-12-15 PROCEDURE — 25010000002 METHYLPREDNISOLONE PER 40 MG: Performed by: INTERNAL MEDICINE

## 2019-12-15 PROCEDURE — 97110 THERAPEUTIC EXERCISES: CPT

## 2019-12-15 RX ORDER — LEVOFLOXACIN 500 MG/1
250 TABLET, FILM COATED ORAL DAILY
Status: DISCONTINUED | OUTPATIENT
Start: 2019-12-16 | End: 2019-12-18 | Stop reason: HOSPADM

## 2019-12-15 RX ORDER — LEVOFLOXACIN 5 MG/ML
250 INJECTION, SOLUTION INTRAVENOUS EVERY 24 HOURS
Status: DISCONTINUED | OUTPATIENT
Start: 2019-12-16 | End: 2019-12-15

## 2019-12-15 RX ADMIN — IPRATROPIUM BROMIDE AND ALBUTEROL SULFATE 3 ML: .5; 3 SOLUTION RESPIRATORY (INHALATION) at 00:41

## 2019-12-15 RX ADMIN — LEVOFLOXACIN 250 MG: 5 INJECTION, SOLUTION INTRAVENOUS at 00:00

## 2019-12-15 RX ADMIN — TAMSULOSIN HYDROCHLORIDE 0.4 MG: 0.4 CAPSULE ORAL at 22:13

## 2019-12-15 RX ADMIN — PANTOPRAZOLE SODIUM 40 MG: 40 TABLET, DELAYED RELEASE ORAL at 08:13

## 2019-12-15 RX ADMIN — IPRATROPIUM BROMIDE AND ALBUTEROL SULFATE 3 ML: .5; 3 SOLUTION RESPIRATORY (INHALATION) at 18:42

## 2019-12-15 RX ADMIN — SODIUM CHLORIDE, PRESERVATIVE FREE 10 ML: 5 INJECTION INTRAVENOUS at 08:13

## 2019-12-15 RX ADMIN — AMLODIPINE BESYLATE 5 MG: 5 TABLET ORAL at 08:13

## 2019-12-15 RX ADMIN — BUDESONIDE 0.5 MG: 0.5 INHALANT RESPIRATORY (INHALATION) at 18:42

## 2019-12-15 RX ADMIN — ATORVASTATIN CALCIUM 10 MG: 10 TABLET, FILM COATED ORAL at 22:13

## 2019-12-15 RX ADMIN — BENZONATATE 100 MG: 100 CAPSULE ORAL at 03:20

## 2019-12-15 RX ADMIN — LISINOPRIL 5 MG: 5 TABLET ORAL at 08:13

## 2019-12-15 RX ADMIN — ASPIRIN 81 MG: 81 TABLET, COATED ORAL at 08:13

## 2019-12-15 RX ADMIN — GUAIFENESIN 600 MG: 600 TABLET, EXTENDED RELEASE ORAL at 08:12

## 2019-12-15 RX ADMIN — METOPROLOL TARTRATE 25 MG: 25 TABLET ORAL at 08:13

## 2019-12-15 RX ADMIN — GUAIFENESIN 600 MG: 600 TABLET, EXTENDED RELEASE ORAL at 22:13

## 2019-12-15 RX ADMIN — IPRATROPIUM BROMIDE AND ALBUTEROL SULFATE 3 ML: .5; 3 SOLUTION RESPIRATORY (INHALATION) at 07:23

## 2019-12-15 RX ADMIN — FUROSEMIDE 20 MG: 20 TABLET ORAL at 08:13

## 2019-12-15 RX ADMIN — ENOXAPARIN SODIUM 40 MG: 40 INJECTION SUBCUTANEOUS at 22:12

## 2019-12-15 RX ADMIN — FINASTERIDE 5 MG: 5 TABLET, FILM COATED ORAL at 08:13

## 2019-12-15 RX ADMIN — METHYLPREDNISOLONE SODIUM SUCCINATE 40 MG: 40 INJECTION, POWDER, FOR SOLUTION INTRAMUSCULAR; INTRAVENOUS at 22:12

## 2019-12-15 RX ADMIN — BUDESONIDE 0.5 MG: 0.5 INHALANT RESPIRATORY (INHALATION) at 07:23

## 2019-12-15 RX ADMIN — LIDOCAINE 1 PATCH: 50 PATCH CUTANEOUS at 08:13

## 2019-12-15 RX ADMIN — TRAZODONE HYDROCHLORIDE 50 MG: 50 TABLET ORAL at 22:13

## 2019-12-15 RX ADMIN — SODIUM CHLORIDE, PRESERVATIVE FREE 10 ML: 5 INJECTION INTRAVENOUS at 22:13

## 2019-12-15 RX ADMIN — ACETAMINOPHEN 650 MG: 325 TABLET, FILM COATED ORAL at 08:22

## 2019-12-15 RX ADMIN — ESCITALOPRAM OXALATE 20 MG: 20 TABLET ORAL at 08:12

## 2019-12-15 RX ADMIN — METHYLPREDNISOLONE SODIUM SUCCINATE 40 MG: 40 INJECTION, POWDER, FOR SOLUTION INTRAMUSCULAR; INTRAVENOUS at 08:12

## 2019-12-15 RX ADMIN — CLOPIDOGREL BISULFATE 75 MG: 75 TABLET ORAL at 08:13

## 2019-12-15 RX ADMIN — MELATONIN TAB 5 MG 5 MG: 5 TAB at 22:13

## 2019-12-15 RX ADMIN — IPRATROPIUM BROMIDE AND ALBUTEROL SULFATE 3 ML: .5; 3 SOLUTION RESPIRATORY (INHALATION) at 13:19

## 2019-12-15 RX ADMIN — METOPROLOL TARTRATE 25 MG: 25 TABLET ORAL at 22:13

## 2019-12-15 NOTE — PLAN OF CARE
Patient performed bed mobility and gait today with decreased activity tolerance noted. Patient able to complete 60 feet of gait with cues for pursed lip breathing   Clothing

## 2019-12-15 NOTE — THERAPY TREATMENT NOTE
Patient Name: Jani Pena  : 1950    MRN: [id number]                              Today's Date: 12/15/2019       Admit Date: 2019    Visit Dx:     ICD-10-CM ICD-9-CM   1. Acute on chronic respiratory failure with hypercapnia (CMS/HCC) J96.22 518.84   2. Acute cystitis without hematuria N30.00 595.0   3. Acute renal failure superimposed on chronic kidney disease, unspecified CKD stage, unspecified acute renal failure type (CMS/HCC) N17.9 584.9    N18.9 585.9     Patient Active Problem List   Diagnosis   • Anxiety   • Atrial fibrillation (CMS/HCC)   • Chronic kidney disease   • Steroid-dependent COPD (CMS/HCC)   • Degeneration of cervical intervertebral disc   • GERD without esophagitis   • Diverticulosis   • Hemorrhoids   • Hiatal hernia   • Hyperlipidemia   • Essential hypertension   • Kyphosis, acquired   • PEDRO on CPAP   • Benign prostatic hyperplasia with incomplete bladder emptying   • History of arthritis   • Back pain   • Depression   • AQUINO (dyspnea on exertion)   • History of ventricular septal defect   • COPD with exacerbation (CMS/HCC)   • Pneumonia due to infectious organism   • Impaired mobility and ADLs   • Chronic diastolic congestive heart failure (CMS/HCC)   • Coronary artery disease involving native coronary artery of native heart with angina pectoris (CMS/HCC)   • Lumbar radiculopathy   • Dysphagia   • Anemia   • Cardiac pacemaker in situ   • Sick sinus syndrome (CMS/HCC)   • Polypharmacy   • Chronic respiratory failure with hypoxia and hypercapnia (CMS/HCC)   • Hematoma   • Acute on chronic respiratory failure with hypoxia and hypercapnia (CMS/HCC)   • Repeated falls   • Bruise of both arms   • Acute on chronic respiratory failure with hypercapnia (CMS/HCC)     Past Medical History:   Diagnosis Date   • Abnormal liver enzymes    • Anemia    • Anxiety    • Arthritis    • Atherosclerotic heart disease of native coronary artery without angina pectoris    • Atrial fibrillation  (CMS/[facility])    • Back pain    • BPH (benign prostatic hyperplasia)    • Cataract, bilateral    • Chest pain     2-3 MONTHS AGO.  PER PT, HAS ADDRESSED WITH CARDIOLOGIST.  STATES HAS NTG PRN.   • CHF (congestive heart failure) (CMS/HCC)    • Chronic bronchitis (CMS/HCC)    • Chronic kidney disease    • COPD (chronic obstructive pulmonary disease) (CMS/HCC)    • Degeneration of cervical intervertebral disc    • Depression    • Diverticulosis    • Dyspnea    • Esophageal reflux    • Esophagitis    • Gastritis    • Hematuria    • Hemorrhoids    • Hiatal hernia    • History of arthritis    • History of nuclear stress test     UNSURE OF DATE   • History of peripheral neuropathy    • History of ventricular septal defect    • History of ventricular septal defect    • Hyperlipidemia    • Hypertension    • Impaired functional mobility, balance, gait, and endurance    • Infectious diarrhea    • Kyphosis, acquired    • Lumbar radiculopathy    • Mitral and aortic valve disease    • Nephrolithiasis    • Phimosis    • Problems with swallowing     WITH FOOD   • Respiratory failure (CMS/[facility])    • Sleep apnea     BIPAP   • TIA (transient ischemic attack)     X3.   2014   • Ventral hernia    • Wears glasses     FOR READING     Past Surgical History:   Procedure Laterality Date   • CARDIAC CATHETERIZATION     • CARDIAC CATHETERIZATION N/A 5/24/2018    Procedure: Left Heart Cath;  Surgeon: Edouard Robertson MD;  Location:  [facility] CATH INVASIVE LOCATION;  Service: Cardiovascular   • CARDIAC ELECTROPHYSIOLOGY PROCEDURE N/A 1/31/2019    Procedure: PACEMAKER IMPLANTATION- DC;  Surgeon: Omar Encinas DO;  Location:  [facility] EP INVASIVE LOCATION;  Service: Cardiology   • ENDOSCOPY N/A 1/26/2019    Procedure: ESOPHAGOGASTRODUODENOSCOPY;  Surgeon: Brunner, Mark I, MD;  Location:  [facility];  Service: Gastroenterology   • HERNIA REPAIR     • ORTHOPEDIC SURGERY Left     Left hip arthroplasty   • PACEMAKER IMPLANTATION  01/31/2019   • SUPRAPUBIC  CATHETER INSERTION      History of Percutaneous Catheter Placement Into Ureter   • THROAT SURGERY      FOR SLEEP APNEA   • VSD REPAIR      History of VSD Repair By Patch Closure     General Information     Row Name 12/15/19 1130          PT Evaluation Time/Intention    Document Type  therapy note (daily note)  -BS     Mode of Treatment  physical therapy  -BS     Row Name 12/15/19 1130          General Information    Patient Profile Reviewed?  yes  -BS     Row Name 12/15/19 1130          Cognitive Assessment/Intervention- PT/OT    Orientation Status (Cognition)  oriented x 4  -BS     Row Name 12/15/19 1130          Safety Issues, Functional Mobility    Impairments Affecting Function (Mobility)  shortness of breath;endurance/activity tolerance;balance  -BS       User Key  (r) = Recorded By, (t) = Taken By, (c) = Cosigned By    Initials Name Provider Type    BS Raudel Nelson, PT Physical Therapist        Mobility     Row Name 12/15/19 1130          Bed Mobility Assessment/Treatment    Bed Mobility Assessment/Treatment  supine-sit  -BS     Rolling Left Blount (Bed Mobility)  contact guard  -BS     Supine-Sit Blount (Bed Mobility)  contact guard  -BS     Assistive Device (Bed Mobility)  bed rails;head of bed elevated  -BS     Row Name 12/15/19 1130          Sit-Stand Transfer    Sit-Stand Bl[place] (Transfers)  contact guard  -BS     Assistive Device (Sit-Stand Transfers)  walker, 4-wheeled  -BS     Row Name 12/15/19 1130          Gait/Stairs Assessment/Training    11766 - Gait Training Minutes   10  -BS     Gait/Stairs Assessment/Training  gait/ambulation independence;gait/ambulation assistive device;distance ambulated  -BS     Blount Level (Gait)  contact guard;1 person assist  -BS     Assistive Device (Gait)  walker, 4-wheeled  -BS     Distance in Feet (Gait)  60  -BS     Pattern (Gait)  2-point  -BS     Deviations/Abnormal Patterns (Gait)  bilateral deviations;pati decreased;gait speed  decreased  -BS     Comment (Gait/Stairs)  X 3 standing rest breaks with vebal cues to promote breathing technique. 4L NC.   -BS       User Key  (r) = Recorded By, (t) = Taken By, (c) = Cosigned By    Initials Name Provider Type    Raudel Handley, WILFRED Physical Therapist        Obj/Interventions     Row Name 12/15/19 1130          Therapeutic Exercise    Upper Extremity (Therapeutic Exercise)  bicep curl, bilateral  -BS     Upper Extremity Range of Motion (Therapeutic Exercise)  shoulder flexion/extension, bilateral  -BS     Lower Extremity (Therapeutic Exercise)  gluteal sets;LAQ (long arc quad), bilateral;marching while seated  -BS     Row Name 12/15/19 1130          Sensory Assessment/Intervention    Sensory General Assessment  no sensation deficits identified  -BS       User Key  (r) = Recorded By, (t) = Taken By, (c) = Cosigned By    Initials Name Provider Type    Raudel Handley, WILFRED Physical Therapist        Goals/Plan    No documentation.       Clinical Impression     Row Name 12/15/19 1130          Pain Scale: Numbers Pre/Post-Treatment    Pain Scale: Numbers, Post-Treatment  0/10 - no pain  -BS     Row Name 12/15/19 1130          Vital Signs    Pre SpO2 (%)  92  -BS     O2 Delivery Pre Treatment  supplemental O2  -BS     Intra SpO2 (%)  92  -BS     O2 Delivery Intra Treatment  supplemental O2  -BS     Post SpO2 (%)  92  -BS     O2 Delivery Post Treatment  supplemental O2  -BS       User Key  (r) = Recorded By, (t) = Taken By, (c) = Cosigned By    Initials Name Provider Type    Raudel Handley, WILFRED Physical Therapist        Outcome Measures     Row Name 12/15/19 1130          How much help from another person do you currently need...    Turning from your back to your side while in flat bed without using bedrails?  4  -BS     Moving from lying on back to sitting on the side of a flat bed without bedrails?  4  -BS     Moving to and from a bed to a chair (including a wheelchair)?  3  -BS     Standing up from  a chair using your arms (e.g., wheelchair, bedside chair)?  3  -BS     Climbing 3-5 steps with a railing?  2  -BS     To walk in hospital room?  3  -BS     AM-PAC 6 Clicks Score (PT)  19  -BS       User Key  (r) = Recorded By, (t) = Taken By, (c) = Cosigned By    Initials Name Provider Type    BS Raudel Nelson, PT Physical Therapist          PT Recommendation and Plan     Outcome Summary/Treatment Plan (PT)  Anticipated Discharge Disposition (PT): home with home health, assisted living facility ([facility]), home  Plan of Care Reviewed With: patient, daughter  Outcome Summary: Patient performed bed mobility and gait today with decreased activity tolerance noted. Patient able to complete 60 feet of gait with cues for pursed lip breathing.     Time Calculation:   PT Charges     Row Name 12/15/19 1145 12/15/19 1130          Time Calculation    Start Time  1105  -BS  --     Stop Time  1130  -BS  --     Time Calculation (min)  25 min  -BS  --     PT Received On  12/15/19  -BS  --     PT Goal Re-Cert Due Date  12/28/19  -BS  --        Time Calculation- PT    Total Timed Code Minutes- PT  25 minute(s)  -BS  --        Timed Charges    74358 - PT Therapeutic Exercise Minutes  15  -BS  --     77951 - Gait Training Minutes   --  10  -BS        PT/SLP KX Modifier    Exception criteria met to exceed therapy cap  Apply KX Modifier  -BS  --       User Key  (r) = Recorded By, (t) = Taken By, (c) = Cosigned By    Initials Name Provider Type    BS Raudel Nelson, PT Physical Therapist        Therapy Charges for Today     Code Description Service Date Service Provider Modifiers Qty    92005457534 HC PT EVAL LOW COMPLEXITY 2 12/14/2019 Raudel Nelson, PT GP, KX 1    90805700886 HC PT THER PROC EA 15 MIN 12/15/2019 Raudel Nelson, PT GP, KX 1    33784986738 HC GAIT TRAINING EA 15 MIN 12/15/2019 Raudel Nelson, PT GP, KX 1          PT G-Codes  Outcome Measure Options: AM-PAC 6 Clicks Basic Mobility (PT)  AM-PAC 6 Clicks Score (PT): 19    Raudel   Omar, PT  12/15/2019

## 2019-12-15 NOTE — PROGRESS NOTES
Hospitalist Progress Note.    LOS: 2 days    Patient Care Team:  Miguel Rojas MD as PCP - General  Miguel Rojas MD as PCP - Family Medicine  Ulysses Bass MD as Cardiologist (Cardiology)    Chief Complaint:    Follow-up on acute urinary tract infection, follow-up metabolic encephalopathy    SUBJECTIVE:  Patient seen and examined this morning.  Events from last night noted.  He is actually feeling better this morning with improved shortness of breath but does have a productive cough which is a chronic issue for him.  He also reports improvement in his urinary hesitancy and frequency.  He has not yet gotten out of bed and feels that he is too weak to do so.  I advised him that his urine culture only grew out 50,000 CFU's of Corynebacterium and that he would be able to convert to oral therapy today.  He is currently requiring 4 L of oxygen via nasal cannula which is his home liter flow.  I advised him that he should be medically ready for discharge tomorrow when he informed me that he is waiting to move into [facility] assisted living but felt that he was too weak to do that as yet.  I then advised that he consider rehab after we have him evaluated by PT and OT here.  He then reported that he does not have any days left.  During this discussion he became quite upset and defensive and stated that he just was not strong enough to be discharged.  I encouraged him to get in touch with his family members to help him get prepared to move into [facility] when he is discharged from here.   Telemetry reviewed.  He had one short run of SVT for about 10 beats but otherwise no significant pauses, tachycardia or bradycardia noted.    Hospital Course:  Patient is a very pleasant 69-year-old male with medical history of advanced COPD on 4 L of oxygen via nasal cannula at home BiPAP with chronic diastolic heart failure, atrial fibrillation not on anticoagulation likely due to recurrent falls, sick sinus syndrome status post  "pacemaker placement, who presented to the ER on 12/13/2019 with complaints of progressive worsening cough and congestion with exposure to recent strep.  He also reported increased urinary frequency and dysuria.  His vitals were relatively stable on admission.  Labs are fairly unremarkable with exception of proBNP of 2393 and BUN/creatinine 46/2.3 with baseline creatinine of 2.  Urinalysis suggestive of an UTI with white blood cells and positive leukocyte esterase.  His blood gas was fairly unremarkable with the exception of CO2 of 61 with baseline around 50s.  He was started on IV Rocephin, Solu-Medrol and admitted to the hospitalist service for COPD exacerbation and possible UTI.    Review of Systems:    The pertinent  ROS was done and it is noted above, rest  was negative.    OBJECTIVE:    Vital Signs  /65 (BP Location: Right arm, Patient Position: Lying)   Pulse 86   Temp 97.7 °F (36.5 °C) (Oral)   Resp 18   Ht 172.7 cm (67.99\")   Wt 79.1 kg (174 lb 4.8 oz)   SpO2 95%   BMI 26.51 kg/m²       I/O this shift:  In: 240 [P.O.:240]  Out: 200 [Urine:200]    Intake/Output Summary (Last 24 hours) at 12/15/2019 1251  Last data filed at 12/15/2019 0900  Gross per 24 hour   Intake 690 ml   Output 1275 ml   Net -585 ml       Physical Exam:    General Appearance: alert, oriented x 3, no acute distress, pleasant  HEENT: pupils round and reactive to light, oral mucosa dry, extraocular movements intact.  Neck: supple, no JVD, trachea midline  Lungs: fair air entry bilaterally, mild diffuse expiratory wheezing with occasional rhonchi  Heart: RRR, normal S1 and S2, no S3, no rub  Abdomen: soft, non-tender, no palpable bladder, present bowel sounds to auscultation  Extremities: no edema, cyanosis or clubbing.   Neuro: normal speech and mental status, grossly non focal.  Skin: diffuse ecchymoses and bruising on bilateral upper and lower extremities from multiple falls     Results Review:    Results from last 7 days "   Lab Units 12/15/19  0541 12/14/19  0612 12/13/19  1838   SODIUM mmol/L 143 139 141   POTASSIUM mmol/L 4.1 4.6 4.3   CHLORIDE mmol/L 97* 95* 94*   CO2 mmol/L 33.4* 32.4* 33.0*   BUN mg/dL 38* 42* 46*   CREATININE mg/dL 1.76* 2.14* 2.31*   CALCIUM mg/dL 9.1 9.4 9.5   BILIRUBIN mg/dL  --   --  0.8   ALK PHOS U/L  --   --  75   ALT (SGPT) U/L  --   --  12   AST (SGOT) U/L  --   --  24   GLUCOSE mg/dL 139* 184* 109*       Estimated Creatinine Clearance: 44.3 mL/min (A) (by C-G formula based on SCr of 1.76 mg/dL (H)).    Results from last 7 days   Lab Units 12/15/19  0541   PHOSPHORUS mg/dL 3.8             Results from last 7 days   Lab Units 12/15/19  0541 12/14/19  0715 12/13/19  1838   WBC 10*3/mm3 10.69 5.53 8.00   HEMOGLOBIN g/dL 9.3* 9.6* 9.7*   PLATELETS 10*3/mm3 210 193 189               Imaging Results (Last 24 Hours)     ** No results found for the last 24 hours. **          amLODIPine 5 mg Oral Q24H   aspirin 81 mg Oral Daily   atorvastatin 10 mg Oral Nightly   budesonide 0.5 mg Nebulization BID - RT   clopidogrel 75 mg Oral Daily   enoxaparin 40 mg Subcutaneous Nightly   escitalopram 20 mg Oral Daily   finasteride 5 mg Oral Daily   furosemide 20 mg Oral Daily   guaiFENesin 600 mg Oral BID   ipratropium-albuterol 3 mL Nebulization Q6H - RT   levoFLOXacin 250 mg Intravenous Q24H   lidocaine 1 patch Transdermal Q24H   lisinopril 5 mg Oral Daily   melatonin 5 mg Oral Nightly   methylPREDNISolone sodium succinate 40 mg Intravenous Q12H   metoprolol tartrate 25 mg Oral BID   pantoprazole 40 mg Oral Daily   sodium chloride 10 mL Intravenous Q12H   tamsulosin 0.4 mg Oral Nightly   traZODone 50 mg Oral Nightly          Medication Review:   Current Facility-Administered Medications   Medication Dose Route Frequency Provider Last Rate Last Dose   • acetaminophen (TYLENOL) tablet 650 mg  650 mg Oral Q4H PRN Kaylee Michael DO   650 mg at 12/15/19 0822    Or   • acetaminophen (TYLENOL) 160 MG/5ML solution 650 mg  650 mg  Oral Q4H PRN Kaylee Michael, DO        Or   • acetaminophen (TYLENOL) suppository 650 mg  650 mg Rectal Q4H PRN Kaylee Michael, DO       • amLODIPine (NORVASC) tablet 5 mg  5 mg Oral Q24H Kaylee Michael, DO   5 mg at 12/15/19 0813   • aspirin EC tablet 81 mg  81 mg Oral Daily Kaylee Michael, DO   81 mg at 12/15/19 0813   • atorvastatin (LIPITOR) tablet 10 mg  10 mg Oral Nightly Kaylee Michael, DO   10 mg at 12/14/19 2036   • benzonatate (TESSALON) capsule 100 mg  100 mg Oral TID PRN Kaylee Michael, DO   100 mg at 12/15/19 0320   • bisacodyl (DULCOLAX) suppository 10 mg  10 mg Rectal Daily PRN Kaylee Michael, DO       • budesonide (PULMICORT) nebulizer solution 0.5 mg  0.5 mg Nebulization BID - RT Kaylee Michael, DO   0.5 mg at 12/15/19 0723   • clopidogrel (PLAVIX) tablet 75 mg  75 mg Oral Daily Kaylee Michael, DO   75 mg at 12/15/19 0813   • cyclobenzaprine (FLEXERIL) tablet 10 mg  10 mg Oral TID PRN Kaylee Michael, DO       • enoxaparin (LOVENOX) syringe 40 mg  40 mg Subcutaneous Nightly Kaylee Michael, DO   40 mg at 12/14/19 2036   • escitalopram (LEXAPRO) tablet 20 mg  20 mg Oral Daily Kaylee Michael, DO   20 mg at 12/15/19 0812   • finasteride (PROSCAR) tablet 5 mg  5 mg Oral Daily Kaylee Michael, DO   5 mg at 12/15/19 0813   • furosemide (LASIX) tablet 20 mg  20 mg Oral Daily Kaylee Michael, DO   20 mg at 12/15/19 0813   • guaiFENesin (MUCINEX) 12 hr tablet 600 mg  600 mg Oral BID Kaylee Michael, DO   600 mg at 12/15/19 0812   • ipratropium-albuterol (DUO-NEB) nebulizer solution 3 mL  3 mL Nebulization Q6H - RT Kaylee Michael DO   3 mL at 12/15/19 0723   • ipratropium-albuterol (DUO-NEB) nebulizer solution 3 mL  3 mL Nebulization Q4H PRN Kaylee Michael DO       • levoFLOXacin (LEVAQUIN) 250 mg/50 mL D5W (premix) 250 mg  250 mg Intravenous Q24H Kaylee Michael DO   250 mg at 12/15/19 0000   • lidocaine (LIDODERM) 5 % 1 patch  1 patch Transdermal Q24H Kaylee Michael DO   1 patch at 12/15/19  0813   • lisinopril (PRINIVIL,ZESTRIL) tablet 5 mg  5 mg Oral Daily Kaylee Michael, DO   5 mg at 12/15/19 0813   • melatonin tablet 5 mg  5 mg Oral Nightly Kaylee Michael, DO   5 mg at 12/14/19 2036   • methylPREDNISolone sodium succinate (SOLU-Medrol) injection 40 mg  40 mg Intravenous Q12H Ottoniel Hill MD   40 mg at 12/15/19 0812   • metoprolol tartrate (LOPRESSOR) tablet 25 mg  25 mg Oral BID Kaylee Michael, DO   25 mg at 12/15/19 0813   • pantoprazole (PROTONIX) EC tablet 40 mg  40 mg Oral Daily Kaylee Michael, DO   40 mg at 12/15/19 0813   • polyethylene glycol (MIRALAX) packet 17 g  17 g Oral Daily PRN Kaylee Michael, DO       • promethazine (PHENERGAN) tablet 12.5 mg  12.5 mg Oral Q6H PRN Kaylee Michael DO        Or   • promethazine (PHENERGAN) injection 12.5 mg  12.5 mg Intramuscular Q6H PRN Kaylee Michael, DO        Or   • promethazine (PHENERGAN) injection 12.5 mg  12.5 mg Intravenous Q6H PRN Kaylee Michael DO        Or   • promethazine (PHENERGAN) suppository 12.5 mg  12.5 mg Rectal Q6H PRN Kaylee Mcihael, DO       • sodium chloride 0.9 % flush 10 mL  10 mL Intravenous PRN Kaylee Michael DO       • sodium chloride 0.9 % flush 10 mL  10 mL Intravenous Q12H Kaylee Michael, DO   10 mL at 12/15/19 0813   • sodium chloride 0.9 % flush 10 mL  10 mL Intravenous PRN Kaylee Michael DO       • tamsulosin (FLOMAX) 24 hr capsule 0.4 mg  0.4 mg Oral Nightly Kaylee Michael DO   0.4 mg at 12/14/19 2036   • traZODone (DESYREL) tablet 50 mg  50 mg Oral Nightly Kaylee Michael DO   50 mg at 12/14/19 2036       ASSESSMENT/PLAN:    Acute on chronic respiratory failure with hypercapnia (CMS/HCC)    Atrial fibrillation (CMS/HCC)    Chronic kidney disease    Steroid-dependent COPD (CMS/HCC)    Essential hypertension    PEDRO on CPAP    COPD with exacerbation (CMS/HCC)    Impaired mobility and ADLs    Chronic diastolic congestive heart failure (CMS/HCC)    Cardiac pacemaker in situ    1. Acute on chronic  hypercapnic respiratory failure, prior to admission, related to #2  2. Acute COPD exacerbation possible acute bronchitis  3. Dehydration, prior to admission with syncope at home, likely vasovagal, with reduced oral intake  4. Debility  5. Anemia chronic of chronic kidney disease  6. CKD  7. Chronic hypoxic respiratory failure  8. Acute metabolic encephalopathy, related to respiratory failure, without sepsis, resolved  9.  Acute urinary tract infection, doing 50,000 units of Corynebacterium    Patient is currently stable and back on his regular home liter flow of oxygen at 4 L.  His cough and shortness of breath have improved.  I will decrease his Solu-Medrol to 40 mg daily starting tomorrow.  I have reviewed his chest x-ray from admission and I do not see any pneumonia.  He likely has acute bronchitis for which she will be continued on Levaquin.  Continue with nebulized steroids and bronchodilators as well.    Patient's urine culture is growing Corynebacterium, unclear if contamination versus true infection.  He will be continued on Levaquin as above.    Patient advised to participate in PT and OT today.  Again, I have reiterated to him that once he is medically ready we cannot keep him in the hospital for continued rehabilitation.  Encouraged him to try and get in touch with his family members to arrange for likely discharge tomorrow either home vs. [facility].     I also discussed the details with the nursing staff.    Rest as ordered.    Ottoniel Hill MD  12/15/19  12:51 PM

## 2019-12-16 ENCOUNTER — APPOINTMENT (OUTPATIENT)
Dept: GENERAL RADIOLOGY | Facility: HOSPITAL | Age: 69
End: 2019-12-16

## 2019-12-16 LAB
BACTERIA SPEC RESP CULT: NORMAL
GRAM STN SPEC: NORMAL

## 2019-12-16 PROCEDURE — 94799 UNLISTED PULMONARY SVC/PX: CPT

## 2019-12-16 PROCEDURE — 99232 SBSQ HOSP IP/OBS MODERATE 35: CPT | Performed by: NURSE PRACTITIONER

## 2019-12-16 PROCEDURE — 25010000002 METHYLPREDNISOLONE PER 40 MG: Performed by: INTERNAL MEDICINE

## 2019-12-16 PROCEDURE — 94660 CPAP INITIATION&MGMT: CPT

## 2019-12-16 PROCEDURE — 97116 GAIT TRAINING THERAPY: CPT

## 2019-12-16 PROCEDURE — 25010000002 ENOXAPARIN PER 10 MG: Performed by: FAMILY MEDICINE

## 2019-12-16 PROCEDURE — 71045 X-RAY EXAM CHEST 1 VIEW: CPT

## 2019-12-16 PROCEDURE — 97165 OT EVAL LOW COMPLEX 30 MIN: CPT

## 2019-12-16 PROCEDURE — 87205 SMEAR GRAM STAIN: CPT | Performed by: NURSE PRACTITIONER

## 2019-12-16 PROCEDURE — 97110 THERAPEUTIC EXERCISES: CPT

## 2019-12-16 RX ADMIN — ATORVASTATIN CALCIUM 10 MG: 10 TABLET, FILM COATED ORAL at 20:39

## 2019-12-16 RX ADMIN — SODIUM CHLORIDE, PRESERVATIVE FREE 10 ML: 5 INJECTION INTRAVENOUS at 20:40

## 2019-12-16 RX ADMIN — ASPIRIN 81 MG: 81 TABLET, COATED ORAL at 08:01

## 2019-12-16 RX ADMIN — TAMSULOSIN HYDROCHLORIDE 0.4 MG: 0.4 CAPSULE ORAL at 20:39

## 2019-12-16 RX ADMIN — FINASTERIDE 5 MG: 5 TABLET, FILM COATED ORAL at 08:01

## 2019-12-16 RX ADMIN — IPRATROPIUM BROMIDE AND ALBUTEROL SULFATE 3 ML: .5; 3 SOLUTION RESPIRATORY (INHALATION) at 20:43

## 2019-12-16 RX ADMIN — GUAIFENESIN 600 MG: 600 TABLET, EXTENDED RELEASE ORAL at 20:39

## 2019-12-16 RX ADMIN — AMLODIPINE BESYLATE 5 MG: 5 TABLET ORAL at 08:02

## 2019-12-16 RX ADMIN — LISINOPRIL 5 MG: 5 TABLET ORAL at 08:02

## 2019-12-16 RX ADMIN — TRAZODONE HYDROCHLORIDE 50 MG: 50 TABLET ORAL at 20:39

## 2019-12-16 RX ADMIN — BUDESONIDE 0.5 MG: 0.5 INHALANT RESPIRATORY (INHALATION) at 07:10

## 2019-12-16 RX ADMIN — ENOXAPARIN SODIUM 40 MG: 40 INJECTION SUBCUTANEOUS at 20:39

## 2019-12-16 RX ADMIN — IPRATROPIUM BROMIDE AND ALBUTEROL SULFATE 3 ML: .5; 3 SOLUTION RESPIRATORY (INHALATION) at 00:25

## 2019-12-16 RX ADMIN — CLOPIDOGREL BISULFATE 75 MG: 75 TABLET ORAL at 08:01

## 2019-12-16 RX ADMIN — LIDOCAINE 1 PATCH: 50 PATCH CUTANEOUS at 08:04

## 2019-12-16 RX ADMIN — PANTOPRAZOLE SODIUM 40 MG: 40 TABLET, DELAYED RELEASE ORAL at 08:01

## 2019-12-16 RX ADMIN — METHYLPREDNISOLONE SODIUM SUCCINATE 40 MG: 40 INJECTION, POWDER, FOR SOLUTION INTRAMUSCULAR; INTRAVENOUS at 20:40

## 2019-12-16 RX ADMIN — BUDESONIDE 0.5 MG: 0.5 INHALANT RESPIRATORY (INHALATION) at 20:43

## 2019-12-16 RX ADMIN — LEVOFLOXACIN 250 MG: 500 TABLET, FILM COATED ORAL at 08:01

## 2019-12-16 RX ADMIN — FUROSEMIDE 20 MG: 20 TABLET ORAL at 08:01

## 2019-12-16 RX ADMIN — MELATONIN TAB 5 MG 5 MG: 5 TAB at 20:39

## 2019-12-16 RX ADMIN — SODIUM CHLORIDE, PRESERVATIVE FREE 10 ML: 5 INJECTION INTRAVENOUS at 10:15

## 2019-12-16 RX ADMIN — IPRATROPIUM BROMIDE AND ALBUTEROL SULFATE 3 ML: .5; 3 SOLUTION RESPIRATORY (INHALATION) at 12:58

## 2019-12-16 RX ADMIN — GUAIFENESIN 600 MG: 600 TABLET, EXTENDED RELEASE ORAL at 08:01

## 2019-12-16 RX ADMIN — METOPROLOL TARTRATE 25 MG: 25 TABLET ORAL at 08:02

## 2019-12-16 RX ADMIN — METHYLPREDNISOLONE SODIUM SUCCINATE 40 MG: 40 INJECTION, POWDER, FOR SOLUTION INTRAMUSCULAR; INTRAVENOUS at 08:04

## 2019-12-16 RX ADMIN — IPRATROPIUM BROMIDE AND ALBUTEROL SULFATE 3 ML: .5; 3 SOLUTION RESPIRATORY (INHALATION) at 07:12

## 2019-12-16 RX ADMIN — METOPROLOL TARTRATE 25 MG: 25 TABLET ORAL at 20:39

## 2019-12-16 RX ADMIN — ESCITALOPRAM OXALATE 20 MG: 20 TABLET ORAL at 08:01

## 2019-12-16 NOTE — PLAN OF CARE
Problem: Patient Care Overview  Goal: Plan of Care Review  Outcome: Ongoing (interventions implemented as appropriate)  Flowsheets  Taken 12/16/2019 1221  Progress: improving  Plan of Care Reviewed With: patient  Taken 12/16/2019 1059  Outcome Summary: OT eval completed. Patient presents deficits in strength, endurance, balance, mobility and ADL performance. patient is expected to benefit from continued OT services prior to DC.

## 2019-12-16 NOTE — PLAN OF CARE
Problem: Patient Care Overview  Goal: Plan of Care Review  Outcome: Ongoing (interventions implemented as appropriate)  Flowsheets (Taken 12/16/2019 0423)  Progress: improving  Plan of Care Reviewed With: patient  Outcome Summary: rested well, VSS, wore bipap, continues to have loose cough

## 2019-12-16 NOTE — PLAN OF CARE
Problem: Patient Care Overview  Goal: Plan of Care Review  Flowsheets (Taken 12/16/2019 1221 by Kelle Edwards, OT)  Progress: improving  Plan of Care Reviewed With: patient  Note:   PLANNING D/C TO ASSISTED LIVING FACILITY

## 2019-12-16 NOTE — THERAPY TREATMENT NOTE
Acute Care - Physical Therapy Treatment Note   [facility]     Patient Name: Jani Pena  : 1950  MRN: [id number]  Today's Date: 2019  Onset of Illness/Injury or Date of Surgery: 19     Referring Physician: Dr. Michael    Admit Date: 2019    Visit Dx:    ICD-10-CM ICD-9-CM   1. Acute on chronic respiratory failure with hypercapnia (CMS/HCC) J96.22 518.84   2. Acute cystitis without hematuria N30.00 595.0   3. Acute renal failure superimposed on chronic kidney disease, unspecified CKD stage, unspecified acute renal failure type (CMS/HCC) N17.9 584.9    N18.9 585.9   4. Impaired mobility and ADLs Z74.09 799.89     Patient Active Problem List   Diagnosis   • Anxiety   • Atrial fibrillation (CMS/HCC)   • Chronic kidney disease   • Steroid-dependent COPD (CMS/HCC)   • Degeneration of cervical intervertebral disc   • GERD without esophagitis   • Diverticulosis   • Hemorrhoids   • Hiatal hernia   • Hyperlipidemia   • Essential hypertension   • Kyphosis, acquired   • PEDRO on CPAP   • Benign prostatic hyperplasia with incomplete bladder emptying   • History of arthritis   • Back pain   • Depression   • AQUINO (dyspnea on exertion)   • History of ventricular septal defect   • COPD with exacerbation (CMS/HCC)   • Pneumonia due to infectious organism   • Impaired mobility and ADLs   • Chronic diastolic congestive heart failure (CMS/HCC)   • Coronary artery disease involving native coronary artery of native heart with angina pectoris (CMS/HCC)   • Lumbar radiculopathy   • Dysphagia   • Anemia   • Cardiac pacemaker in situ   • Sick sinus syndrome (CMS/HCC)   • Polypharmacy   • Chronic respiratory failure with hypoxia and hypercapnia (CMS/HCC)   • Hematoma   • Acute on chronic respiratory failure with hypoxia and hypercapnia (CMS/HCC)   • Repeated falls   • Bruise of both arms   • Acute on chronic respiratory failure with hypercapnia (CMS/HCC)       Therapy Treatment    Rehabilitation Treatment Summary      Row Name 12/16/19 1106             Treatment Time/Intention    Discipline  physical therapy assistant  -RM      Document Type  therapy note (daily note)  -RM      Subjective Information  complains of;weakness;dyspnea  -RM      Mode of Treatment  physical therapy  -RM      Patient Effort  good  -RM      Existing Precautions/Restrictions  fall;oxygen therapy device and L/min  -RM      Treatment Considerations/Comments  5 lpm pnc   -RM      Recorded by [RM] Zenon Rothman, PTA 12/16/19 1637      Row Name 12/16/19 1106             Vital Signs    Pre SpO2 (%)  96  -RM      O2 Delivery Pre Treatment  supplemental O2  -RM      Intra SpO2 (%)  92  -RM      O2 Delivery Intra Treatment  supplemental O2  -RM      Post SpO2 (%)  98  -RM      O2 Delivery Post Treatment  supplemental O2  -RM      Pre Patient Position  Supine  -RM      Intra Patient Position  Standing  -RM      Post Patient Position  Sitting  -RM      Recorded by [RM] Zenon Rothman, PTA 12/16/19 1637      Row Name 12/16/19 1106             Safety Issues, Functional Mobility    Safety Issues Affecting Function (Mobility)  positioning of assistive device  -RM      Impairments Affecting Function (Mobility)  balance;endurance/activity tolerance;shortness of breath;strength  -RM      Recorded by [RM] Zenon Rothman, PTA 12/16/19 1637      Row Name 12/16/19 1106             Bed Mobility Assessment/Treatment    Supine-Sit [place] (Bed Mobility)  contact guard  -RM      Bed Mobility, Safety Issues  decreased use of arms for pushing/pulling;decreased use of legs for bridging/pushing  -RM      Assistive Device (Bed Mobility)  bed rails;head of bed elevated  -RM      Recorded by [RM] Zenon Rothman, PTA 12/16/19 1637      Row Name 12/16/19 1106             Sit-Stand Transfer    Sit-Stand [place] (Transfers)  contact guard  -RM      Assistive Device (Sit-Stand Transfers)  walker, 4-wheeled  -RM      Recorded by [RM] Zenon Rothman, PTA 12/16/19  1637      Row Name 12/16/19 1106             Stand-Sit Transfer    Stand-Sit [place] (Transfers)  contact guard  -RM      Assistive Device (Stand-Sit Transfers)  walker, 4-wheeled  -RM      Recorded by [RM] Zenon Rothman, [facility] 12/16/19 1637      Row Name 12/16/19 1106             Gait/Stairs Assessment/Training    [place] Level (Gait)  contact guard;verbal cues  -RM      Assistive Device (Gait)  walker, 4-wheeled  -RM      Distance in Feet (Gait)  30'x2 one standing rest   -RM      Pattern (Gait)  step-to  -RM      Deviations/Abnormal Patterns (Gait)  base of support, wide;gait speed decreased;stride length decreased  -RM      Bilateral Gait Deviations  forward flexed posture  -RM      Recorded by [RM] Zenon Rothman, [facility] 12/16/19 1637      Row Name 12/16/19 1106             Motor Skills Assessment/Interventions    Additional Documentation  Therapeutic Exercise (Group)  -RM      Recorded by [RM] Zenon Rothman, [facility] 12/16/19 1637      Row Name 12/16/19 1106             Therapeutic Exercise    Lower Extremity (Therapeutic Exercise)  gastroc stretch, bilateral;gluteal sets;LAQ (long arc quad), bilateral;marching while seated  -RM      Exercise Type (Therapeutic Exercise)  AROM (active range of motion)  -RM      Position (Therapeutic Exercise)  seated  -RM      Sets/Reps (Therapeutic Exercise)  1x12   -RM      Recorded by [RM] Zenon Rothman, PTA 12/16/19 1637      Row Name 12/16/19 1106             Static Sitting Balance    Level of [place] (Unsupported Sitting, Static Balance)  independent  -RM      Time Able to Maintain Position (Unsupported Sitting, Static Balance)  more than 5 minutes  -RM      Recorded by [RM] Zenon Rothman, PTA 12/16/19 1637      Row Name 12/16/19 1106             Dynamic Sitting Balance    Level of [place], Reaches Outside Midline (Sitting, Dynamic Balance)  independent;supervision  -RM      Recorded by [RM] Zenon Rothman, [facility] 12/16/19 1637      Row Name  12/16/19 1106             Static Standing Balance    Level of [place] (Supported Standing, Static Balance)  contact guard assist  -RM      Assistive Device Utilized (Supported Standing, Static Balance)  walker, rolling  -RM      Recorded by [RM] Zenon Rothman, \[facility]\"" 12/16/19 1637      Row Name 12/16/19 1106             Dynamic Standing Balance    Level of [place], Reaches Outside Midline (Standing, Dynamic Balance)  contact guard assist  -RM      Time Able to Maintain Position, Reaches Outside Midline (Standing, Dynamic Balance)  more than 5 minutes  -RM      Assistive Device Utilized (Supported Standing, Dynamic Balance)  walker, rolling  -RM      Recorded by [RM] Zenon Rothman, PTA 12/16/19 1637      Row Name 12/16/19 1106             Positioning and Restraints    Pre-Treatment Position  in bed  -RM      Post Treatment Position  chair  -RM      In Chair  reclined;call light within reach;encouraged to call for assist;notified nsg  -RM      Recorded by [RM] Zenon Rothman, \[facility]\"" 12/16/19 1637      Row Name 12/16/19 1106             Pain Scale: Numbers Pre/Post-Treatment    Pain Scale: Numbers, Pretreatment  6/10  -RM      Pain Scale: Numbers, Post-Treatment  6/10  -RM      Pain Location - Orientation  lower  -RM      Pain Location  back  -RM      Pain Intervention(s)  Repositioned;Ambulation/increased activity  -RM      Recorded by [RM] Zenon Rothman, \[facility]\"" 12/16/19 1637      Row Name 12/16/19 1106             Plan of Care Review    Plan of Care Reviewed With  patient  -RM      Progress  improving  -RM      Outcome Summary  Pt treatment completed for this date. Pt performed gait training with focuse on pacing and pursed lip breathing with activity as well as strengthening with pt performing B LE strengthening exercises in bedside recliner x 12 reps.  See flowsheet for details.   -RM      Recorded by [RM] Zenon Rothman, PTA 12/16/19 1637      Row Name 12/16/19 1106             Outcome  Summary/Treatment Plan (PT)    Daily Summary of Progress (PT)  progress towards functional goals is fair  -RM      Plan for Continued Treatment (PT)  Cont PT per POC.   -RM      Recorded by [] Zenon Rothman, PTA 12/16/19 8829        User Key  (r) = Recorded By, (t) = Taken By, (c) = Cosigned By    Initials Name Effective Dates Discipline     Zenon Rothman, PTA 03/07/18 -  PT               Rehab Goal Summary     Row Name 12/16/19 1059             Occupational Therapy Goals    Dressing Goal Selection (OT)  dressing, OT goal 2  -SD      Toileting Goal Selection (OT)  toileting, OT goal 1  -SD      Activity Tolerance Goal Selection (OT)  activity tolerance, OT goal 1  -SD      Functional Mobility Goal Selection (OT)  functional mobility, OT goal 1  -SD         Dressing Goal 2 (OT)    Activity/Assistive Device (Dressing Goal 2, OT)  lower body dressing  -SD      [place]/Cues Needed (Dressing Goal 2, OT)  contact guard assist  -SD      Time Frame (Dressing Goal 2, OT)  2 weeks  -SD      Progress/Outcome (Dressing Goal 2, OT)  goal ongoing  -SD         Toileting Goal 1 (OT)    Activity/Device (Toileting Goal 1, OT)  toileting skills, all;commode;grab bar/safety frame  -SD      [place] Level/Cues Needed (Toileting Goal 1, OT)  contact guard assist  -SD      Time Frame (Toileting Goal 1, OT)  2 weeks  -SD      Progress/Outcome (Toileting Goal 1, OT)  goal ongoing  -SD          Activity Tolerance Goal 1 (OT)    Activity Tolerance Goal 1 (OT)  patient to perform UB ther ex/ADLs  -SD      Activity Level (Endurance Goal 1, OT)  10 min activity;O2 sat >/ equal to 88%  -SD      Time Frame (Activity Tolerance Goal 1, OT)  long term goal (LTG)  -SD      Progress/Outcome (Activity Tolerance Goal 1, OT)  goal ongoing  -SD         Functional Mobility Goal 1 (OT)    Activity/Assistive Device (Functional Mobility Goal 1, OT)  walker, rolling  -SD      [place] Level/Cues Needed (Functional Mobility Goal 1, OT)   contact guard assist  -SD      Distance Goal 1 (Functional Mobility, OT)  100  -SD      Time Frame (Functional Mobility Goal 1, OT)  long term goal (LTG)  -SD      Progress/Outcome (Functional Mobility Goal 1, OT)  goal ongoing  -SD        User Key  (r) = Recorded By, (t) = Taken By, (c) = Cosigned By    Initials Name Provider Type Discipline    Kelle Taylor OT Occupational Therapist OT              PT Recommendation and Plan     Outcome Summary/Treatment Plan (PT)  Daily Summary of Progress (PT): progress towards functional goals is fair  Plan for Continued Treatment (PT): Cont PT per POC.   Plan of Care Reviewed With: patient  Progress: improving  Outcome Summary: Pt treatment completed for this date. Pt performed gait training with focuse on pacing and pursed lip breathing with activity as well as strengthening with pt performing B LE strengthening exercises in bedside recliner x 12 reps.  See flowsheet for details.   Outcome Measures     Row Name 12/16/19 1106 12/16/19 1059          How much help from another person do you currently need...    Turning from your back to your side while in flat bed without using bedrails?  4  -RM  --     Moving from lying on back to sitting on the side of a flat bed without bedrails?  4  -RM  --     Moving to and from a bed to a chair (including a wheelchair)?  3  -RM  --     Standing up from a chair using your arms (e.g., wheelchair, bedside chair)?  3  -RM  --     Climbing 3-5 steps with a railing?  2  -RM  --     To walk in hospital room?  3  -RM  --     AM-PAC 6 Clicks Score (PT)  19  -RM  --        How much help from another is currently needed...    Putting on and taking off regular lower body clothing?  --  3  -SD     Bathing (including washing, rinsing, and drying)  --  3  -SD     Toileting (which includes using toilet bed pan or urinal)  --  3  -SD     Putting on and taking off regular upper body clothing  --  3  -SD     Taking care of personal grooming (such as  brushing teeth)  --  3  -SD     Eating meals  --  3  -SD     AM-PAC 6 Clicks Score (OT)  --  18  -SD        Functional Assessment    Outcome Measure Options  AM-PAC 6 Clicks Basic Mobility (PT)  -RM  AM-PAC 6 Clicks Daily Activity (OT)  -SD       User Key  (r) = Recorded By, (t) = Taken By, (c) = Cosigned By    Initials Name Provider Type    Zenon Epps, HELADIO Physical Therapy Assistant    Kelle Taylor, OT Occupational Therapist         Time Calculation:   PT Charges     Row Name 12/16/19 1639             Time Calculation    Start Time  1106  -RM      Stop Time  1132  -RM      Time Calculation (min)  26 min  -RM      PT Received On  12/16/19  -RM      PT Goal Re-Cert Due Date  12/28/19  -RM         Time Calculation- PT    Total Timed Code Minutes- PT  26 minute(s)  -RM         Timed Charges    59238 - PT Therapeutic Exercise Minutes  11  -RM      01714 - Gait Training Minutes   15  -RM        User Key  (r) = Recorded By, (t) = Taken By, (c) = Cosigned By    Initials Name Provider Type    Zenon Epps, HELADIO Physical Therapy Assistant        Therapy Charges for Today     Code Description Service Date Service Provider Modifiers Qty    06807224550 HC PT THER PROC EA 15 MIN 12/16/2019 Zenon Rothman, PTA GP, KX 1    88957787580 HC GAIT TRAINING EA 15 MIN 12/16/2019 Zenon Rothman, HELADIO GP, KX 1          PT G-Codes  Outcome Measure Options: AM-PAC 6 Clicks Basic Mobility (PT)  AM-PAC 6 Clicks Score (PT): 19  AM-PAC 6 Clicks Score (OT): 18    Zenon Rothman PTA  12/16/2019

## 2019-12-16 NOTE — THERAPY EVALUATION
Acute Care - Occupational Therapy Initial Evaluation  [facility]     Patient Name: Jani Pena  : 1950  MRN: [id number]  Today's Date: 2019  Onset of Illness/Injury or Date of Surgery: 19  Date of Referral to OT: 19  Referring Physician: Dr. Michael    Admit Date: 2019       ICD-10-CM ICD-9-CM   1. Acute on chronic respiratory failure with hypercapnia (CMS/HCC) J96.22 518.84   2. Acute cystitis without hematuria N30.00 595.0   3. Acute renal failure superimposed on chronic kidney disease, unspecified CKD stage, unspecified acute renal failure type (CMS/HCC) N17.9 584.9    N18.9 585.9   4. Impaired mobility and ADLs Z74.09 799.89     Patient Active Problem List   Diagnosis   • Anxiety   • Atrial fibrillation (CMS/HCC)   • Chronic kidney disease   • Steroid-dependent COPD (CMS/HCC)   • Degeneration of cervical intervertebral disc   • GERD without esophagitis   • Diverticulosis   • Hemorrhoids   • Hiatal hernia   • Hyperlipidemia   • Essential hypertension   • Kyphosis, acquired   • PEDRO on CPAP   • Benign prostatic hyperplasia with incomplete bladder emptying   • History of arthritis   • Back pain   • Depression   • AQUINO (dyspnea on exertion)   • History of ventricular septal defect   • COPD with exacerbation (CMS/HCC)   • Pneumonia due to infectious organism   • Impaired mobility and ADLs   • Chronic diastolic congestive heart failure (CMS/HCC)   • Coronary artery disease involving native coronary artery of native heart with angina pectoris (CMS/HCC)   • Lumbar radiculopathy   • Dysphagia   • Anemia   • Cardiac pacemaker in situ   • Sick sinus syndrome (CMS/HCC)   • Polypharmacy   • Chronic respiratory failure with hypoxia and hypercapnia (CMS/HCC)   • Hematoma   • Acute on chronic respiratory failure with hypoxia and hypercapnia (CMS/HCC)   • Repeated falls   • Bruise of both arms   • Acute on chronic respiratory failure with hypercapnia (CMS/HCC)     Past Medical History:    Diagnosis Date   • Abnormal liver enzymes    • Anemia    • Anxiety    • Arthritis    • Atherosclerotic heart disease of native coronary artery without angina pectoris    • Atrial fibrillation (CMS/HCC)    • Back pain    • BPH (benign prostatic hyperplasia)    • Cataract, bilateral    • Chest pain     2-3 MONTHS AGO.  PER PT, HAS ADDRESSED WITH CARDIOLOGIST.  STATES HAS NTG PRN.   • CHF (congestive heart failure) (CMS/HCC)    • Chronic bronchitis (CMS/HCC)    • Chronic kidney disease    • COPD (chronic obstructive pulmonary disease) (CMS/HCC)    • Degeneration of cervical intervertebral disc    • Depression    • Diverticulosis    • Dyspnea    • Esophageal reflux    • Esophagitis    • Gastritis    • Hematuria    • Hemorrhoids    • Hiatal hernia    • History of arthritis    • History of nuclear stress test     UNSURE OF DATE   • History of peripheral neuropathy    • History of ventricular septal defect    • History of ventricular septal defect    • Hyperlipidemia    • Hypertension    • Impaired functional mobility, balance, gait, and endurance    • Infectious diarrhea    • Kyphosis, acquired    • Lumbar radiculopathy    • Mitral and aortic valve disease    • Nephrolithiasis    • Phimosis    • Problems with swallowing     WITH FOOD   • Respiratory failure (CMS/HCC)    • Sleep apnea     BIPAP   • TIA (transient ischemic attack)     X3.   2014   • Ventral hernia    • Wears glasses     FOR READING     Past Surgical History:   Procedure Laterality Date   • CARDIAC CATHETERIZATION     • CARDIAC CATHETERIZATION N/A 5/24/2018    Procedure: Left Heart Cath;  Surgeon: Edouard Robertson MD;  Location:  [facility];  Service: Cardiovascular   • CARDIAC ELECTROPHYSIOLOGY PROCEDURE N/A 1/31/2019    Procedure: PACEMAKER IMPLANTATION- DC;  Surgeon: Omar Encinas DO;  Location:  [facility];  Service: Cardiology   • ENDOSCOPY N/A 1/26/2019    Procedure: ESOPHAGOGASTRODUODENOSCOPY;  Surgeon: Brunner,  Eddie SEVERINO MD;  Location: [facility] ENDOSCOPY;  Service: Gastroenterology   • HERNIA REPAIR     • ORTHOPEDIC SURGERY Left     Left hip arthroplasty   • PACEMAKER IMPLANTATION  01/31/2019   • SUPRAPUBIC CATHETER INSERTION      History of Percutaneous Catheter Placement Into Ureter   • THROAT SURGERY      FOR SLEEP APNEA   • VSD REPAIR      History of VSD Repair By Patch Closure          OT ASSESSMENT FLOWSHEET (last 12 hours)      Occupational Therapy Evaluation     Row Name 12/16/19 1059                   OT Evaluation Time/Intention    Subjective Information  complains of;weakness;dyspnea;pain  -SD        Document Type  evaluation  -SD        Mode of Treatment  occupational therapy  -SD        Patient Effort  good  -SD        Symptoms Noted During/After Treatment  fatigue;shortness of breath  -SD           General Information    Patient Profile Reviewed?  yes  -SD        Onset of Illness/Injury or Date of Surgery  12/13/19  -SD        Referring Physician  Dr. Michael  -SD        Patient Observations  alert;cooperative;agree to therapy  -SD        General Observations of Patient  supine in bed, on 5L O2  -SD        Prior Level of Function  independent:;all household mobility  -SD        Equipment Currently Used at Home  oxygen;raised toilet;rollator;wheelchair;bath bench  -SD        Pertinent History of Current Functional Problem  Acute on chronic resp failure with hypercapnia; COPD, CHF, CKD  -SD        Existing Precautions/Restrictions  fall;oxygen therapy device and L/min  -SD        Risks Reviewed  patient:;increased discomfort  -SD        Benefits Reviewed  patient:;improve function;increase independence;increase strength;increase balance  -SD        Barriers to Rehab  medically complex  -SD           Relationship/Environment    Primary Source of Support/Comfort  child(miguel);friend  -SD        Lives With  alone  -SD           Resource/Environmental Concerns    Current Living Arrangements  home/apartment/condo  -SD            Home Main Entrance    Number of Stairs, Main Entrance  one  -SD           Cognitive Assessment/Intervention- PT/OT    Orientation Status (Cognition)  oriented x 4  -SD        Follows Commands (Cognition)  verbal cues/prompting required  -SD        Safety Deficit (Cognitive)  safety precautions follow-through/compliance;awareness of need for assistance  -SD           Safety Issues, Functional Mobility    Safety Issues Affecting Function (Mobility)  safety precautions follow-through/compliance;awareness of need for assistance  -SD        Impairments Affecting Function (Mobility)  balance;endurance/activity tolerance;shortness of breath;strength  -SD           Bed Mobility Assessment/Treatment    Bed Mobility Assessment/Treatment  supine-sit  -SD        Rolling Left [place] (Bed Mobility)  contact guard  -SD        Assistive Device (Bed Mobility)  bed rails;head of bed elevated  -SD           Functional Mobility    Functional Mobility- Ind. Level  contact guard assist  -SD        Functional Mobility- Device  rollator  -SD        Functional Mobility-Distance (Feet)  33 x2  -SD        Functional Mobility- Safety Issues  balance decreased during turns;sequencing ability decreased;step length decreased;weight-shifting ability decreased;loses balance backward;supplemental O2  -SD           Transfer Assessment/Treatment    Transfer Assessment/Treatment  sit-stand transfer;stand-sit transfer  -SD           Sit-Stand Transfer    Sit-Stand [place] (Transfers)  contact guard  -SD        Assistive Device (Sit-Stand Transfers)  walker, 4-wheeled  -SD           Stand-Sit Transfer    Stand-Sit [place] (Transfers)  contact guard  -SD        Assistive Device (Stand-Sit Transfers)  walker, 4-wheeled  -SD           ADL Assessment/Intervention    BADL Assessment/Intervention  bathing;upper body dressing;lower body dressing;grooming;feeding;toileting  -SD           Bathing Assessment/Intervention    Bathing  [place] Level  minimum assist (75% patient effort)  -SD           Upper Body Dressing Assessment/Training    Upper Body Dressing [place] Level  contact guard assist  -SD           Lower Body Dressing Assessment/Training    Lower Body Dressing [place] Level  minimum assist (75% patient effort)  -SD           Grooming Assessment/Training    [place] Level (Grooming)  contact guard assist  -SD           Self-Feeding Assessment/Training    [place] Level (Feeding)  supervision  -SD           Toileting Assessment/Training    [place] Level (Toileting)  minimum assist (75% patient effort)  -SD           BADL Safety/Performance    Impairments, BADL Safety/Performance  balance;endurance/activity tolerance;shortness of breath;strength  -SD           General ROM    GENERAL ROM COMMENTS  WFL  -SD           MMT (Manual Muscle Testing)    General MMT Comments  3+/5  -SD           Pain Assessment    Additional Documentation  Pain Scale: Numbers Pre/Post-Treatment (Group)  -SD           Pain Scale: Numbers Pre/Post-Treatment    Pain Scale: Numbers, Pretreatment  6/10  -SD        Pain Scale: Numbers, Post-Treatment  6/10  -SD        Pain Location - Orientation  lower  -SD        Pain Location  back  -SD        Pain Intervention(s)  Repositioned;Ambulation/increased activity  -SD           Coping    Observed Emotional State  calm;cooperative  -SD        Verbalized Emotional State  acceptance  -SD           Plan of Care Review    Plan of Care Reviewed With  patient  -SD        Progress  improving  -SD        Outcome Summary  OT eval completed. Patient presents deficits in strength, endurance, balance, mobility and ADL performance. patient is expected to benefit from continued OT services prior to DC.   -SD           Clinical Impression (OT)    Date of Referral to OT  12/13/19  -SD        OT Diagnosis  Dr. Michael  -SD        Criteria for Skilled Therapeutic Interventions Met (OT Eval)  yes  -SD        Rehab  Potential (OT Eval)  good, to achieve stated therapy goals  -SD        Therapy Frequency (OT Eval)  3 times/wk 5 times if indicated  -SD        Care Plan Review (OT)  evaluation/treatment results reviewed  -SD        Anticipated Discharge Disposition (OT)  assisted living facility ([facility])  -SD           Vital Signs    Pre SpO2 (%)  96  -SD        O2 Delivery Pre Treatment  supplemental O2  -SD        Intra SpO2 (%)  92  -SD        O2 Delivery Intra Treatment  supplemental O2  -SD        Post SpO2 (%)  94  -SD        O2 Delivery Post Treatment  supplemental O2  -SD           Planned OT Interventions    Planned Therapy Interventions (OT Eval)  activity tolerance training;adaptive equipment training;BADL retraining;patient/caregiver education/training;strengthening exercise;transfer/mobility retraining  -SD           OT Goals    Dressing Goal Selection (OT)  dressing, OT goal 2  -SD        Toileting Goal Selection (OT)  toileting, OT goal 1  -SD        Activity Tolerance Goal Selection (OT)  activity tolerance, OT goal 1  -SD        Functional Mobility Goal Selection (OT)  functional mobility, OT goal 1  -SD        Additional Documentation  Activity Tolerance Goal Selection (OT) (Row);Functional Mobility Selection (OT) (Row)  -SD           Dressing Goal 2 (OT)    Activity/Assistive Device (Dressing Goal 2, OT)  lower body dressing  -SD        [place]/Cues Needed (Dressing Goal 2, OT)  contact guard assist  -SD        Time Frame (Dressing Goal 2, OT)  2 weeks  -SD        Progress/Outcome (Dressing Goal 2, OT)  goal ongoing  -SD           Toileting Goal 1 (OT)    Activity/Device (Toileting Goal 1, OT)  toileting skills, all;commode;grab bar/safety frame  -SD        [place] Level/Cues Needed (Toileting Goal 1, OT)  contact guard assist  -SD        Time Frame (Toileting Goal 1, OT)  2 weeks  -SD        Progress/Outcome (Toileting Goal 1, OT)  goal ongoing  -SD            Activity Tolerance Goal 1 (OT)    Activity  Tolerance Goal 1 (OT)  patient to perform UB ther ex/ADLs  -SD        Activity Level (Endurance Goal 1, OT)  10 min activity;O2 sat >/ equal to 88%  -SD        Time Frame (Activity Tolerance Goal 1, OT)  long term goal (LTG)  -SD        Progress/Outcome (Activity Tolerance Goal 1, OT)  goal ongoing  -SD           Functional Mobility Goal 1 (OT)    Activity/Assistive Device (Functional Mobility Goal 1, OT)  walker, rolling  -SD        [place] Level/Cues Needed (Functional Mobility Goal 1, OT)  contact guard assist  -SD        Distance Goal 1 (Functional Mobility, OT)  100  -SD        Time Frame (Functional Mobility Goal 1, OT)  long term goal (LTG)  -SD        Progress/Outcome (Functional Mobility Goal 1, OT)  goal ongoing  -SD           Living Environment    Home Accessibility  stairs to enter home  -SD          User Key  (r) = Recorded By, (t) = Taken By, (c) = Cosigned By    Initials Name Effective Dates    SD [place], OT 03/07/18 -                OT Recommendation and Plan  Outcome Summary/Treatment Plan (OT)  Anticipated Discharge Disposition (OT): assisted living facility ([facility])  Planned Therapy Interventions (OT Eval): activity tolerance training, adaptive equipment training, BADL retraining, patient/caregiver education/training, strengthening exercise, transfer/mobility retraining  Therapy Frequency (OT Eval): 3 times/wk(5 times if indicated)  Plan of Care Review  Plan of Care Reviewed With: patient  Plan of Care Reviewed With: patient  Outcome Summary: OT eval completed. Patient presents deficits in strength, endurance, balance, mobility and ADL performance. patient is expected to benefit from continued OT services prior to DC.     Outcome Measures     Row Name 12/16/19 4445             How much help from another is currently needed...    Putting on and taking off regular lower body clothing?  3  -SD      Bathing (including washing, rinsing, and drying)  3  -SD      Toileting (which includes  using toilet bed pan or urinal)  3  -SD      Putting on and taking off regular upper body clothing  3  -SD      Taking care of personal grooming (such as brushing teeth)  3  -SD      Eating meals  3  -SD      AM-PAC 6 Clicks Score (OT)  18  -SD         Functional Assessment    Outcome Measure Options  AM-PAC 6 Clicks Daily Activity (OT)  -SD        User Key  (r) = Recorded By, (t) = Taken By, (c) = Cosigned By    Initials Name Provider Type    Kelle Taylor OT Occupational Therapist          Time Calculation:   Time Calculation- OT     Row Name 12/16/19 1222             Time Calculation- OT    OT Start Time  1059  -SD      OT Received On  12/16/19  -SD      OT Goal Re-Cert Due Date  12/26/19  -SD        User Key  (r) = Recorded By, (t) = Taken By, (c) = Cosigned By    Initials Name Provider Type    Kelle Talyor OT Occupational Therapist        Therapy Charges for Today     Code Description Service Date Service Provider Modifiers Qty    [id number]  OT EVAL LOW COMPLEXITY 2 12/16/2019 Kelle Edwards OT GO, KX 1               Kelle Edwards OT  12/16/2019

## 2019-12-16 NOTE — PROGRESS NOTES
PROGRESS NOTE        Date of Admission: 12/13/2019  Length of Stay: 3  Primary Care Physician: Miguel Rojas MD    Subjective   Chief Complaint: Follow up COPD exacerbation  HPI: This is a 69-year-old male who looks older than stated age with a past medical history for advanced COPD on 4 L oxygen, BiPAP, chronic diastolic heart failure, atrial fibrillation not on anticoagulation due to recurrent falls, sick sinus syndrome status post pacemaker placement who came into the emergency room on 12/13/2019 with worsening cough and shortness of breath.  He had also had some increased urinary frequency and dysuria.  His urinalysis was indicative of urinary tract infection.  Chest x-ray was done which did not show any evidence of pneumonia.  His acute urinary tract infection did grow 50,000 units of Corynebacterium for which he has been treated with Levaquin.  Chest x-ray again did not show any evidence of pneumonia but he does appear to have an acute bronchitis.  He is in the process of moving to assisted living.  His Solu-Medrol is in the process of weaning.  I did see and evaluate patient at bedside today.  He still has significant cough to the point that he can hardly catch his breath with productive yellow-green sputum as visualized myself.  I have ordered a sputum culture as I do not see one listed.  [place] to the patient had been in rehab and unfortunately does not have any rehab days left.  He is hoping to be able to move into [facility] assisted living upon discharge from the hospital.           Review Of Systems:   Review of Systems   General ROS: Patient denies any fevers, chills or loss of consciousness.  Generalized weakness  ENT ROS: Denies sore throat, nasal congestion or ear pain.   Hematological and Lymphatic ROS: Denies neck swelling or easy bleeding.  Endocrine ROS: Denies any recent unintentional weight gain or loss.  Respiratory ROS: cough with yellow to green sputum and shortness of breath.    Cardiovascular ROS: Denies chest pain or palpitations. No history of exertional chest pain.   Gastrointestinal ROS: Denies nausea and vomiting. Denies any abdominal pain. No diarrhea.  Genito-Urinary ROS: Denies dysuria or hematuria.  Musculoskeletal ROS: Denies back pain. No muscle pain. No calf pain.   Neurological ROS: Denies any focal weakness. No loss of consciousness. Denies any numbness. Denies neck pain.   Dermatological ROS: Denies any redness or pruritis.    Objective      Temp:  [97.6 °F (36.4 °C)-98.3 °F (36.8 °C)] 98.3 °F (36.8 °C)  Heart Rate:  [] 87  Resp:  [16-20] 18  BP: (113-141)/(49-81) 127/58  Physical Exam    General Appearance:  Alert and cooperative, not in any acute distress.   Head:  Atraumatic and normocephalic, without obvious abnormality.   Eyes:          PERRLA, conjunctivae and sclerae normal, no Icterus. No pallor. Extraocular movements are within normal limits.   Ears:  Ears appear intact with no abnormalities noted.   Throat: No oral lesions, no thrush, oral mucosa moist.   Neck: Supple, trachea midline, no thyromegaly, no carotid bruit.       Lungs:   Chest shape is normal. Breath sounds heard bilaterally equally.  No crackles scattered rhonchi throughout with few wheezing. No Pleural rub or bronchial breathing.   Heart:  Normal S1 and S2, no murmur, no gallop, no rub. No JVD   Abdomen:   Normal bowel sounds, no masses, no organomegaly. Soft    non-tender, non-distended, no guarding, no rebound             tenderness   Extremities: Moves all extremities well, no edema, no cyanosis, no clubbing.   Pulses: Pulses palpable and equal bilaterally   Skin:  Diffuse ecchymosis on bilateral upper and lower extremity secondary to falls.       Neurologic:    Psychiatric/Behavior:      Patient is alert and oriented x3.  He can move extremities on command.    Mood normal, behavior normal       Results Review:    I have reviewed the labs, radiology results and diagnostic  studies.    Results from last 7 days   Lab Units 12/15/19  0541   WBC 10*3/mm3 10.69   HEMOGLOBIN g/dL 9.3*   PLATELETS 10*3/mm3 210     Results from last 7 days   Lab Units 12/15/19  0541   SODIUM mmol/L 143   POTASSIUM mmol/L 4.1   CO2 mmol/L 33.4*   CREATININE mg/dL 1.76*   GLUCOSE mg/dL 139*       Culture Data:   Blood Culture   Date Value Ref Range Status   12/13/2019 No growth at 2 days  Preliminary   12/13/2019 No growth at 2 days  Preliminary     Urine Culture   Date Value Ref Range Status   12/13/2019 50,000 CFU/mL Corynebacterium species (A)  Final     Comment:       No definitive guidelines. All are susceptible to vancomycin. Resistance to penicillins & cephalosporins does occur.     Radiology Data:   Cardiology Data:    I have reviewed the medications.    Assessment/Plan     Assessment/Plan:  1.  Acute on chronic hypoxic/hypercapnic respiratory failure -  Continue Bipap.  Pco2 60s does appear to be patients baseline    2.  COPD with exacerbation-patient is on biotic therapy in the form of Levaquin.  I have ordered a sputum culture.  I have also ordered PMDD as well as a flutter valve.  We are starting to wean his IV steroids.  He is also getting nebulized steroids as well as bronchodilators.    3.  Acute bronchitis-same treatment is #2    4.  Sick sinus syndrome status post pacemaker placement    5.  Dehydration-improved    6.  Debility-PT OT consult.    7.  Acute metabolic encephalopathy-improved    8.  Acute urinary tract infection present on admission secondary to Corynebacterium-  Patient is on Levaquin    9.  Acute on chronic renal failure (stage III)- Improving.        DVT prophylaxis:  lovenox  Discharge Planning:   PAL Stern 12/16/19 3:51 PM

## 2019-12-16 NOTE — PROGRESS NOTES
Continued Stay Note   [facility]     Patient Name: Jani Pena  MRN: [id number]  Today's Date: 12/16/2019    Admit Date: 12/13/2019    Discharge Plan     Row Name 12/16/19 6789       Plan    Plan  [facility]     Plan Comments Called [facility]; spoke with Harish.  She verified that pt is planning to move into their facility.  She reports that the apartment is ready on their end, but not sure if family has moved furniture in yet.  Per Harish, since pt was not a resident prior to admission, she will not have to come see him before discharge.  However, she requests d/c summary be faxed to [phone].  Harish further states that nurse will NOT need to call report, unless there is something of concern r/t his discharge (medications, DME, etc.).  CM will continue to follow.    Row Name 12/16/19 7758       Plan    Plan  :ace review, pt. is a readmit. Is now in assisted living. CM to assist as needed.        Discharge Codes    No documentation.       Expected Discharge Date and Time     Expected Discharge Date Expected Discharge Time    Dec 16, 2019             Danielle Lundy RN

## 2019-12-16 NOTE — PLAN OF CARE
Problem: Patient Care Overview  Goal: Plan of Care Review  Outcome: Ongoing (interventions implemented as appropriate)  Flowsheets (Taken 12/16/2019 1106)  Outcome Summary: Pt treatment completed for this date. Pt performed gait training with focuse on pacing and pursed lip breathing with activity as well as strengthening with pt performing B LE strengthening exercises in bedside recliner x 12 reps.  See flowsheet for details.

## 2019-12-17 LAB
ANION GAP SERPL CALCULATED.3IONS-SCNC: 9.8 MMOL/L (ref 5–15)
BUN BLD-MCNC: 37 MG/DL (ref 8–23)
BUN/CREAT SERPL: 23 (ref 7–25)
CALCIUM SPEC-SCNC: 9.5 MG/DL (ref 8.6–10.5)
CHLORIDE SERPL-SCNC: 97 MMOL/L (ref 98–107)
CO2 SERPL-SCNC: 36.2 MMOL/L (ref 22–29)
CREAT BLD-MCNC: 1.61 MG/DL (ref 0.76–1.27)
DEPRECATED RDW RBC AUTO: 45.8 FL (ref 37–54)
ERYTHROCYTE [DISTWIDTH] IN BLOOD BY AUTOMATED COUNT: 12.9 % (ref 12.3–15.4)
GFR SERPL CREATININE-BSD FRML MDRD: 43 ML/MIN/1.73
GLUCOSE BLD-MCNC: 123 MG/DL (ref 65–99)
HCT VFR BLD AUTO: 34 % (ref 37.5–51)
HGB BLD-MCNC: 10.2 G/DL (ref 13–17.7)
MCH RBC QN AUTO: 29.4 PG (ref 26.6–33)
MCHC RBC AUTO-ENTMCNC: 30 G/DL (ref 31.5–35.7)
MCV RBC AUTO: 98 FL (ref 79–97)
PLATELET # BLD AUTO: 235 10*3/MM3 (ref 140–450)
PMV BLD AUTO: 9.2 FL (ref 6–12)
POTASSIUM BLD-SCNC: 4.3 MMOL/L (ref 3.5–5.2)
RBC # BLD AUTO: 3.47 10*6/MM3 (ref 4.14–5.8)
SODIUM BLD-SCNC: 143 MMOL/L (ref 136–145)
WBC NRBC COR # BLD: 10.24 10*3/MM3 (ref 3.4–10.8)

## 2019-12-17 PROCEDURE — 97110 THERAPEUTIC EXERCISES: CPT

## 2019-12-17 PROCEDURE — 94799 UNLISTED PULMONARY SVC/PX: CPT

## 2019-12-17 PROCEDURE — 94668 MNPJ CHEST WALL SBSQ: CPT

## 2019-12-17 PROCEDURE — 25010000002 METHYLPREDNISOLONE PER 40 MG: Performed by: FAMILY MEDICINE

## 2019-12-17 PROCEDURE — 80048 BASIC METABOLIC PNL TOTAL CA: CPT | Performed by: NURSE PRACTITIONER

## 2019-12-17 PROCEDURE — 85027 COMPLETE CBC AUTOMATED: CPT | Performed by: NURSE PRACTITIONER

## 2019-12-17 PROCEDURE — 97530 THERAPEUTIC ACTIVITIES: CPT

## 2019-12-17 PROCEDURE — 97116 GAIT TRAINING THERAPY: CPT

## 2019-12-17 PROCEDURE — 25010000002 METHYLPREDNISOLONE PER 40 MG: Performed by: INTERNAL MEDICINE

## 2019-12-17 PROCEDURE — 94660 CPAP INITIATION&MGMT: CPT

## 2019-12-17 PROCEDURE — 94669 MECHANICAL CHEST WALL OSCILL: CPT

## 2019-12-17 PROCEDURE — 25010000002 ENOXAPARIN PER 10 MG: Performed by: FAMILY MEDICINE

## 2019-12-17 PROCEDURE — 99232 SBSQ HOSP IP/OBS MODERATE 35: CPT | Performed by: FAMILY MEDICINE

## 2019-12-17 RX ORDER — ALPRAZOLAM 0.5 MG/1
0.5 TABLET ORAL NIGHTLY PRN
COMMUNITY
End: 2019-12-19 | Stop reason: SDUPTHER

## 2019-12-17 RX ORDER — OXYBUTYNIN CHLORIDE 5 MG/1
5 TABLET ORAL NIGHTLY
COMMUNITY
End: 2020-01-08 | Stop reason: SDUPTHER

## 2019-12-17 RX ORDER — MULTIPLE VITAMINS W/ MINERALS TAB 9MG-400MCG
1 TAB ORAL DAILY
COMMUNITY
End: 2020-01-07

## 2019-12-17 RX ORDER — MIRTAZAPINE 15 MG/1
15 TABLET, FILM COATED ORAL NIGHTLY
COMMUNITY
End: 2020-01-31 | Stop reason: SDUPTHER

## 2019-12-17 RX ORDER — BACLOFEN 10 MG/1
10 TABLET ORAL 3 TIMES DAILY
COMMUNITY
End: 2019-12-18 | Stop reason: HOSPADM

## 2019-12-17 RX ORDER — IRON POLYSACCHARIDE COMPLEX 150 MG
150 CAPSULE ORAL DAILY
COMMUNITY
End: 2020-01-07

## 2019-12-17 RX ORDER — SPIRONOLACTONE 25 MG/1
25 TABLET ORAL DAILY
COMMUNITY
End: 2019-12-18 | Stop reason: HOSPADM

## 2019-12-17 RX ORDER — PREDNISONE 20 MG/1
40 TABLET ORAL
Status: DISCONTINUED | OUTPATIENT
Start: 2019-12-18 | End: 2019-12-18 | Stop reason: HOSPADM

## 2019-12-17 RX ADMIN — LEVOFLOXACIN 250 MG: 500 TABLET, FILM COATED ORAL at 08:45

## 2019-12-17 RX ADMIN — LIDOCAINE 1 PATCH: 50 PATCH CUTANEOUS at 08:47

## 2019-12-17 RX ADMIN — PANTOPRAZOLE SODIUM 40 MG: 40 TABLET, DELAYED RELEASE ORAL at 08:45

## 2019-12-17 RX ADMIN — GUAIFENESIN 600 MG: 600 TABLET, EXTENDED RELEASE ORAL at 21:01

## 2019-12-17 RX ADMIN — METHYLPREDNISOLONE SODIUM SUCCINATE 40 MG: 40 INJECTION, POWDER, FOR SOLUTION INTRAMUSCULAR; INTRAVENOUS at 08:47

## 2019-12-17 RX ADMIN — ENOXAPARIN SODIUM 40 MG: 40 INJECTION SUBCUTANEOUS at 21:00

## 2019-12-17 RX ADMIN — SODIUM CHLORIDE, PRESERVATIVE FREE 10 ML: 5 INJECTION INTRAVENOUS at 08:54

## 2019-12-17 RX ADMIN — FUROSEMIDE 20 MG: 20 TABLET ORAL at 08:45

## 2019-12-17 RX ADMIN — IPRATROPIUM BROMIDE AND ALBUTEROL SULFATE 3 ML: .5; 3 SOLUTION RESPIRATORY (INHALATION) at 01:31

## 2019-12-17 RX ADMIN — ATORVASTATIN CALCIUM 10 MG: 10 TABLET, FILM COATED ORAL at 21:01

## 2019-12-17 RX ADMIN — MELATONIN TAB 5 MG 5 MG: 5 TAB at 21:01

## 2019-12-17 RX ADMIN — TAMSULOSIN HYDROCHLORIDE 0.4 MG: 0.4 CAPSULE ORAL at 21:00

## 2019-12-17 RX ADMIN — SODIUM CHLORIDE, PRESERVATIVE FREE 10 ML: 5 INJECTION INTRAVENOUS at 21:01

## 2019-12-17 RX ADMIN — GUAIFENESIN 600 MG: 600 TABLET, EXTENDED RELEASE ORAL at 08:45

## 2019-12-17 RX ADMIN — BUDESONIDE 0.5 MG: 0.5 INHALANT RESPIRATORY (INHALATION) at 19:48

## 2019-12-17 RX ADMIN — FINASTERIDE 5 MG: 5 TABLET, FILM COATED ORAL at 08:46

## 2019-12-17 RX ADMIN — ESCITALOPRAM OXALATE 20 MG: 20 TABLET ORAL at 08:45

## 2019-12-17 RX ADMIN — IPRATROPIUM BROMIDE AND ALBUTEROL SULFATE 3 ML: .5; 3 SOLUTION RESPIRATORY (INHALATION) at 13:12

## 2019-12-17 RX ADMIN — BUDESONIDE 0.5 MG: 0.5 INHALANT RESPIRATORY (INHALATION) at 07:31

## 2019-12-17 RX ADMIN — ASPIRIN 81 MG: 81 TABLET, COATED ORAL at 08:45

## 2019-12-17 RX ADMIN — METOPROLOL TARTRATE 25 MG: 25 TABLET ORAL at 21:00

## 2019-12-17 RX ADMIN — ACETAMINOPHEN 650 MG: 325 TABLET, FILM COATED ORAL at 21:01

## 2019-12-17 RX ADMIN — METOPROLOL TARTRATE 25 MG: 25 TABLET ORAL at 08:45

## 2019-12-17 RX ADMIN — AMLODIPINE BESYLATE 5 MG: 5 TABLET ORAL at 08:45

## 2019-12-17 RX ADMIN — IPRATROPIUM BROMIDE AND ALBUTEROL SULFATE 3 ML: .5; 3 SOLUTION RESPIRATORY (INHALATION) at 07:31

## 2019-12-17 RX ADMIN — LISINOPRIL 5 MG: 5 TABLET ORAL at 08:45

## 2019-12-17 RX ADMIN — TRAZODONE HYDROCHLORIDE 50 MG: 50 TABLET ORAL at 21:00

## 2019-12-17 RX ADMIN — CLOPIDOGREL BISULFATE 75 MG: 75 TABLET ORAL at 08:46

## 2019-12-17 RX ADMIN — IPRATROPIUM BROMIDE AND ALBUTEROL SULFATE 3 ML: .5; 3 SOLUTION RESPIRATORY (INHALATION) at 19:48

## 2019-12-17 RX ADMIN — METHYLPREDNISOLONE SODIUM SUCCINATE 40 MG: 40 INJECTION, POWDER, FOR SOLUTION INTRAMUSCULAR; INTRAVENOUS at 21:00

## 2019-12-17 NOTE — PLAN OF CARE
Problem: Patient Care Overview  Goal: Plan of Care Review  Flowsheets (Taken 12/17/2019 7630)  Progress: improving  Plan of Care Reviewed With: patient

## 2019-12-17 NOTE — PLAN OF CARE
Problem: Patient Care Overview  Goal: Plan of Care Review  Outcome: Ongoing (interventions implemented as appropriate)  Flowsheets  Taken 12/17/2019 1644  Progress: improving  Plan of Care Reviewed With: patient  Taken 12/17/2019 1616  Outcome Summary: OT tx completed. Patient completed toileting task with sup, functional transfers and mobility with SBA using RWx, O2 maintained 90 and > on 4L. patient completed UB ther ex using resistance with good pacing and breathing techniques. Continue OT POC

## 2019-12-17 NOTE — THERAPY TREATMENT NOTE
Acute Care - Physical Therapy Treatment Note   [facility]     Patient Name: Jani Pena  : 1950  MRN: [id number]  Today's Date: 2019  Onset of Illness/Injury or Date of Surgery: 19     Referring Physician: Dr. Michael    Admit Date: 2019    Visit Dx:    ICD-10-CM ICD-9-CM   1. Acute on chronic respiratory failure with hypercapnia (CMS/HCC) J96.22 518.84   2. Acute cystitis without hematuria N30.00 595.0   3. Acute renal failure superimposed on chronic kidney disease, unspecified CKD stage, unspecified acute renal failure type (CMS/HCC) N17.9 584.9    N18.9 585.9   4. Impaired mobility and ADLs Z74.09 799.89     Patient Active Problem List   Diagnosis   • Anxiety   • Atrial fibrillation (CMS/HCC)   • Chronic kidney disease   • Steroid-dependent COPD (CMS/HCC)   • Degeneration of cervical intervertebral disc   • GERD without esophagitis   • Diverticulosis   • Hemorrhoids   • Hiatal hernia   • Hyperlipidemia   • Essential hypertension   • Kyphosis, acquired   • PEDRO on CPAP   • Benign prostatic hyperplasia with incomplete bladder emptying   • History of arthritis   • Back pain   • Depression   • AQUINO (dyspnea on exertion)   • History of ventricular septal defect   • COPD with exacerbation (CMS/HCC)   • Pneumonia due to infectious organism   • Impaired mobility and ADLs   • Chronic diastolic congestive heart failure (CMS/HCC)   • Coronary artery disease involving native coronary artery of native heart with angina pectoris (CMS/HCC)   • Lumbar radiculopathy   • Dysphagia   • Anemia   • Cardiac pacemaker in situ   • Sick sinus syndrome (CMS/HCC)   • Polypharmacy   • Chronic respiratory failure with hypoxia and hypercapnia (CMS/HCC)   • Hematoma   • Acute on chronic respiratory failure with hypoxia and hypercapnia (CMS/HCC)   • Repeated falls   • Bruise of both arms   • Acute on chronic respiratory failure with hypercapnia (CMS/HCC)       Therapy Treatment    Rehabilitation Treatment Summary      Row Name 12/17/19 1616 12/17/19 1400          Treatment Time/Intention    Discipline  occupational therapist  -SD  physical therapy assistant  -RM     Document Type  therapy note (daily note)  -SD  therapy note (daily note)  -RM     Subjective Information  complains of;pain  -SD  complains of;pain  -RM     Mode of Treatment  occupational therapy  -SD  physical therapy  -RM     Patient/Family Observations  supine in bed, 4L O2  -SD  --     Care Plan Review  care plan/treatment goals reviewed  -SD  care plan/treatment goals reviewed  -RM     Patient Effort  good  -SD  good  -RM     Existing Precautions/Restrictions  fall;oxygen therapy device and L/min  -SD  fall;oxygen therapy device and L/min  -RM     Treatment Considerations/Comments  4L  -SD  4 lpm pnc   -RM     Recorded by [SD] Kelle Edwards, OT 12/17/19 1644 [RM] Zenon Rothman, PTA 12/17/19 1851     Row Name 12/17/19 1616 12/17/19 1400          Vital Signs    Pre SpO2 (%)  96  -SD  95  -RM     O2 Delivery Pre Treatment  supplemental O2  -SD  supplemental O2  -RM     Intra SpO2 (%)  90  -SD  93  -RM     O2 Delivery Intra Treatment  supplemental O2  -SD  supplemental O2  -RM     Post SpO2 (%)  96  -SD  95  -RM     O2 Delivery Post Treatment  supplemental O2  -SD  supplemental O2  -RM     Pre Patient Position  --  Sitting  -RM     Intra Patient Position  --  Standing  -RM     Post Patient Position  --  Sitting  -RM     Recorded by [SD] Kelle Edwards, OT 12/17/19 1644 [RM] Zenon Rothman, PTA 12/17/19 1851     Row Name 12/17/19 1400             Safety Issues, Functional Mobility    Safety Issues Affecting Function (Mobility)  safety precaution awareness;safety precautions follow-through/compliance  -RM      Impairments Affecting Function (Mobility)  balance;endurance/activity tolerance;shortness of breath;strength;pain  -RM      Recorded by [RM] Zenon Rothman, PTA 12/17/19 1851      Row Name 12/17/19 1616             Bed Mobility  Assessment/Treatment    Bed Mobility Assessment/Treatment  supine-sit;sit-supine  -SD      Supine-Sit [place] (Bed Mobility)  conditional independence  -SD      Sit-Supine [place] (Bed Mobility)  conditional independence  -SD      Assistive Device (Bed Mobility)  head of bed elevated;bed rails  -SD      Recorded by [SD] Kelle Edwards, OT 12/17/19 1644      Row Name 12/17/19 1616             Functional Mobility    Functional Mobility- Ind. Level  contact guard assist  -SD      Functional Mobility- Device  rollator  -SD      Functional Mobility- Comment  bed to chair and back  -SD      Recorded by [SD] Kelle Edwards, OT 12/17/19 1644      Row Name 12/17/19 1616             Transfer Assessment/Treatment    Transfer Assessment/Treatment  sit-stand transfer;stand-sit transfer;bed-chair transfer  -SD      Recorded by [SD] Kelle Edwards, OT 12/17/19 1644      Row Name 12/17/19 1616             Bed-Chair Transfer    Bed-Chair [place] (Transfers)  stand by assist  -SD      Assistive Device (Bed-Chair Transfers)  walker, 4-wheeled  -SD      Recorded by [SD] Kelle Edwards, OT 12/17/19 1644      Row Name 12/17/19 1616 12/17/19 1400          Sit-Stand Transfer    Sit-Stand [place] (Transfers)  stand by assist  -SD  stand by assist;verbal cues  -RM     Assistive Device (Sit-Stand Transfers)  walker, 4-wheeled  -SD  walker, 4-wheeled  -RM     Recorded by [SD] Kelle Edwards, OT 12/17/19 1644 [RM] Zenon Rothman, PTA 12/17/19 1851     Row Name 12/17/19 1616 12/17/19 1400          Stand-Sit Transfer    Stand-Sit [place] (Transfers)  stand by assist  -SD  stand by assist;verbal cues  -RM     Assistive Device (Stand-Sit Transfers)  walker, 4-wheeled  -SD  walker, 4-wheeled  -RM     Recorded by [SD] Kelle Edwrads, OT 12/17/19 1644 [RM] Zenon Rothman, PTA 12/17/19 1851     Row Name 12/17/19 1400             Gait/Stairs Assessment/Training    [place] Level (Gait)  contact guard;stand  by assist;verbal cues  -RM      Assistive Device (Gait)  walker, 4-wheeled  -RM      Distance in Feet (Gait)  56'x2   -RM      Pattern (Gait)  step-through  -RM      Deviations/Abnormal Patterns (Gait)  base of support, wide;gait speed decreased;stride length decreased  -RM      Bilateral Gait Deviations  forward flexed posture  -RM      Recorded by [RM] Zenon Rothman, PTA 12/17/19 1851      Row Name 12/17/19 1616             Toileting Assessment/Training    [place] Level (Toileting)  set up;supervision using urinal standing at EOB  -SD      Recorded by [SD] Kelle Edwards, OT 12/17/19 1644      Row Name 12/17/19 1616             Therapeutic Exercise    Upper Extremity Range of Motion (Therapeutic Exercise)  shoulder flexion/extension, bilateral;shoulder abduction/adduction, bilateral;shoulder horizontal abduction/adduction, bilateral;elbow flexion/extension, bilateral  -SD      Weight/Resistance (Therapeutic Exercise)  yellow  -SD      Exercise Type (Therapeutic Exercise)  resistive exercises  -SD      Position (Therapeutic Exercise)  seated  -SD      Sets/Reps (Therapeutic Exercise)  1 x 15  -SD      Equipment (Therapeutic Exercise)  resistive bands  -SD      Expected Outcome (Therapeutic Exercise)  improve functional tolerance, self-care activity  -SD      Recorded by [SD] Kelle Edwards, OT 12/17/19 1644      Row Name 12/17/19 1616 12/17/19 1400          Positioning and Restraints    Pre-Treatment Position  in bed  -SD  sitting in chair/recliner  -RM     Post Treatment Position  bed  -SD  chair  -RM     In Bed  supine;call light within reach;encouraged to call for assist  -SD  --     In Chair  --  reclined;call light within reach;encouraged to call for assist;notified nsg  -RM     Recorded by [SD] Kelle Edwards, OT 12/17/19 1644 [RM] Zenon Rothman, PTA 12/17/19 1851     Row Name 12/17/19 1616 12/17/19 1400          Pain Scale: Numbers Pre/Post-Treatment    Pain Scale: Numbers, Pretreatment   5/10  -SD  5/10  -RM     Pain Scale: Numbers, Post-Treatment  5/10  -SD  5/10  -RM     Pain Location - Orientation  lower  -SD  lower  -RM     Pain Location  back  -SD  back  -RM     Pain Intervention(s)  Repositioned;Ambulation/increased activity  -SD  Repositioned;Ambulation/increased activity  -RM     Recorded by [SD] Kelle Edwards OT 12/17/19 1644 [RM] Zenon Rothman, PTA 12/17/19 1851     Row Name 12/17/19 1616             Coping    Observed Emotional State  calm;cooperative  -SD      Verbalized Emotional State  acceptance  -SD      Recorded by [SD] Kelle Edwards OT 12/17/19 1644      Row Name 12/17/19 1616 12/17/19 1400          Plan of Care Review    Plan of Care Reviewed With  patient  -SD  patient  -RM     Progress  improving  -SD  improving  -RM     Outcome Summary  OT tx completed. Patient completed toileting task with sup, functional transfers and mobility with SBA using RWx, O2 maintained 90 and > on 4L. patient completed UB ther ex using resistance with good pacing and breathing techniques. Continue OT POC  -SD  Pt treatment completed with pt ambulating 2x56' cga-sba with rollator .  See flowsheet for details.   -RM     Recorded by [SD] Kelle Edwards OT 12/17/19 1644 [RM] Zenon Rothman, PTA 12/17/19 1851     Row Name 12/17/19 1616             Outcome Summary/Treatment Plan (OT)    Daily Summary of Progress (OT)  progress toward functional goals as expected  -SD      Anticipated Discharge Disposition (OT)  assisted living facility ([facility])  -SD      Recorded by [SD] Kelle Edwards OT 12/17/19 1644      Row Name 12/17/19 1400             Outcome Summary/Treatment Plan (PT)    Daily Summary of Progress (PT)  progress toward functional goals is good  -RM      Plan for Continued Treatment (PT)  Cont PT per POC.   -RM      Recorded by [RM] Zenon Rothman, PTA 12/17/19 1851        User Key  (r) = Recorded By, (t) = Taken By, (c) = Cosigned By    Initials Name Effective Dates Discipline     RM Zenon Rothman, PTA 03/07/18 -  PT    SD Kelle Edwards, OT 03/07/18 -  OT                       PT Recommendation and Plan     Outcome Summary/Treatment Plan (PT)  Daily Summary of Progress (PT): progress toward functional goals is good  Plan for Continued Treatment (PT): Cont PT per POC.   Plan of Care Reviewed With: patient  Progress: improving  Outcome Summary: Pt treatment completed with pt ambulating 2x56' cga-sba with rollator .  See flowsheet for details.   Outcome Measures     Row Name 12/17/19 1616 12/17/19 1400 12/16/19 1106       How much help from another person do you currently need...    Turning from your back to your side while in flat bed without using bedrails?  --  4  -RM  4  -RM    Moving from lying on back to sitting on the side of a flat bed without bedrails?  --  4  -RM  4  -RM    Moving to and from a bed to a chair (including a wheelchair)?  --  3  -RM  3  -RM    Standing up from a chair using your arms (e.g., wheelchair, bedside chair)?  --  4  -RM  3  -RM    Climbing 3-5 steps with a railing?  --  3  -RM  2  -RM    To walk in hospital room?  --  3  -RM  3  -RM    AM-PAC 6 Clicks Score (PT)  --  21  -RM  19  -RM       How much help from another is currently needed...    Putting on and taking off regular lower body clothing?  3  -SD  --  --    Bathing (including washing, rinsing, and drying)  3  -SD  --  --    Toileting (which includes using toilet bed pan or urinal)  3  -SD  --  --    Putting on and taking off regular upper body clothing  4  -SD  --  --    Taking care of personal grooming (such as brushing teeth)  4  -SD  --  --    Eating meals  4  -SD  --  --    AM-PAC 6 Clicks Score (OT)  21  -SD  --  --       Functional Assessment    Outcome Measure Options  AM-PAC 6 Clicks Daily Activity (OT)  -SD  AM-PAC 6 Clicks Basic Mobility (PT)  -RM  AM-PAC 6 Clicks Basic Mobility (PT)  -RM    Row Name 12/16/19 1059             How much help from another is currently needed...    Putting  on and taking off regular lower body clothing?  3  -SD      Bathing (including washing, rinsing, and drying)  3  -SD      Toileting (which includes using toilet bed pan or urinal)  3  -SD      Putting on and taking off regular upper body clothing  3  -SD      Taking care of personal grooming (such as brushing teeth)  3  -SD      Eating meals  3  -SD      AM-PAC 6 Clicks Score (OT)  18  -SD         Functional Assessment    Outcome Measure Options  AM-PAC 6 Clicks Daily Activity (OT)  -SD        User Key  (r) = Recorded By, (t) = Taken By, (c) = Cosigned By    Initials Name Provider Type    Zenon Epps, HELADIO Physical Therapy Assistant    Kelle Taylor, OT Occupational Therapist         Time Calculation:   PT Charges     Row Name 12/17/19 1852             Time Calculation    Start Time  1400  -RM      Stop Time  1425  -RM      Time Calculation (min)  25 min  -RM      PT Received On  12/17/19  -RM      PT Goal Re-Cert Due Date  12/28/19  -RM         Time Calculation- PT    Total Timed Code Minutes- PT  25 minute(s)  -RM         Timed Charges    27997 - Gait Training Minutes   15  -RM      14886 - PT Therapeutic Activity Minutes  10  -RM        User Key  (r) = Recorded By, (t) = Taken By, (c) = Cosigned By    Initials Name Provider Type    Zenon Epps, HELADIO Physical Therapy Assistant        Therapy Charges for Today     Code Description Service Date Service Provider Modifiers Qty    98693470990 HC PT THER PROC EA 15 MIN 12/16/2019 Zenon Rothman, PTA GP, KX 1    18577358808 HC GAIT TRAINING EA 15 MIN 12/16/2019 Zenon Rothman, PTA GP, KX 1    64002661323 HC GAIT TRAINING EA 15 MIN 12/17/2019 Zenon Rothman, PTA GP, KX 1    48671827112 HC PT THERAPEUTIC ACT EA 15 MIN 12/17/2019 Zenon Rotmhan, PTA GP, KX 1          PT G-Codes  Outcome Measure Options: AM-PAC 6 Clicks Daily Activity (OT)  AM-PAC 6 Clicks Score (PT): 21  AM-PAC 6 Clicks Score (OT): 21    Zenon Rothman  PTA  12/17/2019

## 2019-12-17 NOTE — THERAPY TREATMENT NOTE
Acute Care - Occupational Therapy Treatment Note   [place]     Patient Name: Jani Pena  : 1950  MRN: [id number]  Today's Date: 2019  Onset of Illness/Injury or Date of Surgery: 19  Date of Referral to OT: 19  Referring Physician: Dr. Michael    Admit Date: 2019       ICD-10-CM ICD-9-CM   1. Acute on chronic respiratory failure with hypercapnia (CMS/HCC) J96.22 518.84   2. Acute cystitis without hematuria N30.00 595.0   3. Acute renal failure superimposed on chronic kidney disease, unspecified CKD stage, unspecified acute renal failure type (CMS/HCC) N17.9 584.9    N18.9 585.9   4. Impaired mobility and ADLs Z74.09 799.89     Patient Active Problem List   Diagnosis   • Anxiety   • Atrial fibrillation (CMS/HCC)   • Chronic kidney disease   • Steroid-dependent COPD (CMS/HCC)   • Degeneration of cervical intervertebral disc   • GERD without esophagitis   • Diverticulosis   • Hemorrhoids   • Hiatal hernia   • Hyperlipidemia   • Essential hypertension   • Kyphosis, acquired   • PEDRO on CPAP   • Benign prostatic hyperplasia with incomplete bladder emptying   • History of arthritis   • Back pain   • Depression   • AQUINO (dyspnea on exertion)   • History of ventricular septal defect   • COPD with exacerbation (CMS/HCC)   • Pneumonia due to infectious organism   • Impaired mobility and ADLs   • Chronic diastolic congestive heart failure (CMS/HCC)   • Coronary artery disease involving native coronary artery of native heart with angina pectoris (CMS/HCC)   • Lumbar radiculopathy   • Dysphagia   • Anemia   • Cardiac pacemaker in situ   • Sick sinus syndrome (CMS/HCC)   • Polypharmacy   • Chronic respiratory failure with hypoxia and hypercapnia (CMS/HCC)   • Hematoma   • Acute on chronic respiratory failure with hypoxia and hypercapnia (CMS/HCC)   • Repeated falls   • Bruise of both arms   • Acute on chronic respiratory failure with hypercapnia (CMS/HCC)     Past Medical History:   Diagnosis  Date   • Abnormal liver enzymes    • Anemia    • Anxiety    • Arthritis    • Atherosclerotic heart disease of native coronary artery without angina pectoris    • Atrial fibrillation (CMS/HCC)    • Back pain    • BPH (benign prostatic hyperplasia)    • Cataract, bilateral    • Chest pain     2-3 MONTHS AGO.  PER PT, HAS ADDRESSED WITH CARDIOLOGIST.  STATES HAS NTG PRN.   • CHF (congestive heart failure) (CMS/HCC)    • Chronic bronchitis (CMS/HCC)    • Chronic kidney disease    • COPD (chronic obstructive pulmonary disease) (CMS/HCC)    • Degeneration of cervical intervertebral disc    • Depression    • Diverticulosis    • Dyspnea    • Esophageal reflux    • Esophagitis    • Gastritis    • Hematuria    • Hemorrhoids    • Hiatal hernia    • History of arthritis    • History of nuclear stress test     UNSURE OF DATE   • History of peripheral neuropathy    • History of ventricular septal defect    • History of ventricular septal defect    • Hyperlipidemia    • Hypertension    • Impaired functional mobility, balance, gait, and endurance    • Infectious diarrhea    • Kyphosis, acquired    • Lumbar radiculopathy    • Mitral and aortic valve disease    • Nephrolithiasis    • Phimosis    • Problems with swallowing     WITH FOOD   • Respiratory failure (CMS/HCC)    • Sleep apnea     BIPAP   • TIA (transient ischemic attack)     X3.   2014   • Ventral hernia    • Wears glasses     FOR READING     Past Surgical History:   Procedure Laterality Date   • CARDIAC CATHETERIZATION     • CARDIAC CATHETERIZATION N/A 5/24/2018    Procedure: Left Heart Cath;  Surgeon: Edouard Robertson MD;  Location:  [facility]VASIVE [facility];  Service: Cardiovascular   • CARDIAC ELECTROPHYSIOLOGY PROCEDURE N/A 1/31/2019    Procedure: PACEMAKER IMPLANTATION- DC;  Surgeon: Omar Encinas DO;  Location: [facility] EP INVASIVE LOCATION;  Service: Cardiology   • ENDOSCOPY N/A 1/26/2019    Procedure: ESOPHAGOGASTRODUODENOSCOPY;  Surgeon: Brunner, Mark I, MD;   Location: [facility] ENDOSCOPY;  Service: Gastroenterology   • HERNIA REPAIR     • ORTHOPEDIC SURGERY Left     Left hip arthroplasty   • PACEMAKER IMPLANTATION  01/31/2019   • SUPRAPUBIC CATHETER INSERTION      History of Percutaneous Catheter Placement Into Ureter   • THROAT SURGERY      FOR SLEEP APNEA   • VSD REPAIR      History of VSD Repair By Patch Closure       Therapy Treatment    Rehabilitation Treatment Summary     Row Name 12/17/19 1616             Treatment Time/Intention    Discipline  occupational therapist  -SD      Document Type  therapy note (daily note)  -SD      Subjective Information  complains of;pain  -SD      Mode of Treatment  occupational therapy  -SD      Patient/Family Observations  supine in bed, 4L O2  -SD      Care Plan Review  care plan/treatment goals reviewed  -SD      Patient Effort  good  -SD      Existing Precautions/Restrictions  fall;oxygen therapy device and L/min  -SD      Treatment Considerations/Comments  4L  -SD      Recorded by [SD] Kelle Edwards OT 12/17/19 1644      Row Name 12/17/19 1616             Vital Signs    Pre SpO2 (%)  96  -SD      O2 Delivery Pre Treatment  supplemental O2  -SD      Intra SpO2 (%)  90  -SD      O2 Delivery Intra Treatment  supplemental O2  -SD      Post SpO2 (%)  96  -SD      O2 Delivery Post Treatment  supplemental O2  -SD      Recorded by [SD] Kelle Edwards OT 12/17/19 1644      Row Name 12/17/19 1616             Bed Mobility Assessment/Treatment    Bed Mobility Assessment/Treatment  supine-sit;sit-supine  -SD      Supine-Sit [place] (Bed Mobility)  conditional independence  -SD      Sit-Supine [place] (Bed Mobility)  conditional independence  -SD      Assistive Device (Bed Mobility)  head of bed elevated;bed rails  -SD      Recorded by [SD] Kelle Edwards OT 12/17/19 1644      Row Name 12/17/19 1616             Functional Mobility    Functional Mobility- Ind. Level  contact guard assist  -SD      Functional Mobility- Device   rollator  -SD      Functional Mobility- Comment  bed to chair and back  -SD      Recorded by [SD] Kelle Edwards, OT 12/17/19 1644      Row Name 12/17/19 1616             Transfer Assessment/Treatment    Transfer Assessment/Treatment  sit-stand transfer;stand-sit transfer;bed-chair transfer  -SD      Recorded by [SD] Kelle Edwards OT 12/17/19 1644      Row Name 12/17/19 1616             Bed-Chair Transfer    Bed-Chair [place] (Transfers)  stand by assist  -SD      Assistive Device (Bed-Chair Transfers)  walker, 4-wheeled  -SD      Recorded by [SD] Kelle Edwards OT 12/17/19 1644      Row Name 12/17/19 1616             Sit-Stand Transfer    Sit-Stand [place] (Transfers)  stand by assist  -SD      Assistive Device (Sit-Stand Transfers)  walker, 4-wheeled  -SD      Recorded by [SD] Kelle Edwards OT 12/17/19 1644      Row Name 12/17/19 1616             Stand-Sit Transfer    Stand-Sit [place] (Transfers)  stand by assist  -SD      Assistive Device (Stand-Sit Transfers)  walker, 4-wheeled  -SD      Recorded by [SD] Kelle Edwards, OT 12/17/19 1644      Row Name 12/17/19 1616             Toileting Assessment/Training    [place] Level (Toileting)  set up;supervision using urinal standing at EOB  -SD      Recorded by [SD] Kelle Edwards, OT 12/17/19 1644      Row Name 12/17/19 1616             Therapeutic Exercise    Upper Extremity Range of Motion (Therapeutic Exercise)  shoulder flexion/extension, bilateral;shoulder abduction/adduction, bilateral;shoulder horizontal abduction/adduction, bilateral;elbow flexion/extension, bilateral  -SD      Weight/Resistance (Therapeutic Exercise)  yellow  -SD      Exercise Type (Therapeutic Exercise)  resistive exercises  -SD      Position (Therapeutic Exercise)  seated  -SD      Sets/Reps (Therapeutic Exercise)  1 x 15  -SD      Equipment (Therapeutic Exercise)  resistive bands  -SD      Expected Outcome (Therapeutic Exercise)  improve functional  tolerance, self-care activity  -SD      Recorded by [SD] Kelle Edwards OT 12/17/19 1644      Row Name 12/17/19 1616             Positioning and Restraints    Pre-Treatment Position  in bed  -SD      Post Treatment Position  bed  -SD      In Bed  supine;call light within reach;encouraged to call for assist  -SD      Recorded by [SD] Kelle Edwards OT 12/17/19 1644      Row Name 12/17/19 1616             Pain Scale: Numbers Pre/Post-Treatment    Pain Scale: Numbers, Pretreatment  5/10  -SD      Pain Scale: Numbers, Post-Treatment  5/10  -SD      Pain Location - Orientation  lower  -SD      Pain Location  back  -SD      Pain Intervention(s)  Repositioned;Ambulation/increased activity  -SD      Recorded by [SD] Kelle Edwards OT 12/17/19 1644      Row Name 12/17/19 1616             Coping    Observed Emotional State  calm;cooperative  -SD      Verbalized Emotional State  acceptance  -SD      Recorded by [SD] Kelle Edwards OT 12/17/19 1644      Row Name 12/17/19 1616             Plan of Care Review    Plan of Care Reviewed With  patient  -SD      Progress  improving  -SD      Outcome Summary  OT tx completed. Patient completed toileting task with sup, functional transfers and mobility with SBA using RWx, O2 maintained 90 and > on 4L. patient completed UB ther ex using resistance with good pacing and breathing techniques. Continue OT POC  -SD      Recorded by [SD] Kelle Edwards OT 12/17/19 1644      Row Name 12/17/19 1616             Outcome Summary/Treatment Plan (OT)    Daily Summary of Progress (OT)  progress toward functional goals as expected  -SD      Anticipated Discharge Disposition (OT)  assisted living facility ([facility])  -SD      Recorded by [SD] Kelle Edwards OT 12/17/19 1644        User Key  (r) = Recorded By, (t) = Taken By, (c) = Cosigned By    Initials Name Effective Dates Discipline    SD Kelle Edwards OT 03/07/18 -  OT                 OT Recommendation and Plan  Outcome  Summary/Treatment Plan (OT)  Daily Summary of Progress (OT): progress toward functional goals as expected  Anticipated Discharge Disposition (OT): assisted living facility (longterm)  Planned Therapy Interventions (OT Eval): activity tolerance training, adaptive equipment training, BADL retraining, patient/caregiver education/training, strengthening exercise, transfer/mobility retraining  Therapy Frequency (OT Eval): 3 times/wk(5 times if indicated)  Daily Summary of Progress (OT): progress toward functional goals as expected  Plan of Care Review  Plan of Care Reviewed With: patient  Plan of Care Reviewed With: patient  Outcome Summary: OT tx completed. Patient completed toileting task with sup, functional transfers and mobility with SBA using RWx, O2 maintained 90 and > on 4L. patient completed UB ther ex using resistance with good pacing and breathing techniques. Continue OT POC  Outcome Measures     Row Name 12/17/19 1616 12/16/19 1106 12/16/19 1059       How much help from another person do you currently need...    Turning from your back to your side while in flat bed without using bedrails?  --  4  -RM  --    Moving from lying on back to sitting on the side of a flat bed without bedrails?  --  4  -RM  --    Moving to and from a bed to a chair (including a wheelchair)?  --  3  -RM  --    Standing up from a chair using your arms (e.g., wheelchair, bedside chair)?  --  3  -RM  --    Climbing 3-5 steps with a railing?  --  2  -RM  --    To walk in hospital room?  --  3  -RM  --    AM-PAC 6 Clicks Score (PT)  --  19  -RM  --       How much help from another is currently needed...    Putting on and taking off regular lower body clothing?  3  -SD  --  3  -SD    Bathing (including washing, rinsing, and drying)  3  -SD  --  3  -SD    Toileting (which includes using toilet bed pan or urinal)  3  -SD  --  3  -SD    Putting on and taking off regular upper body clothing  4  -SD  --  3  -SD    Taking care of personal grooming  (such as brushing teeth)  4  -SD  --  3  -SD    Eating meals  4  -SD  --  3  -SD    AM-PAC 6 Clicks Score (OT)  21  -SD  --  18  -SD       Functional Assessment    Outcome Measure Options  AM-PAC 6 Clicks Daily Activity (OT)  -SD  AM-PAC 6 Clicks Basic Mobility (PT)  -RM  AM-PAC 6 Clicks Daily Activity (OT)  -SD      User Key  (r) = Recorded By, (t) = Taken By, (c) = Cosigned By    Initials Name Provider Type    RM Zenon Rothman, PTA Physical Therapy Assistant    SD Kelle Edwards OT Occupational Therapist           Time Calculation:   Time Calculation- OT     Row Name 12/17/19 1646             Time Calculation- OT    OT Start Time  1616  -SD      OT Received On  12/17/19  -SD      OT Goal Re-Cert Due Date  12/26/19  -SD         Timed Charges    92293 - OT Therapeutic Exercise Minutes  15  -SD      57269 - OT Therapeutic Activity Minutes  8  -SD        User Key  (r) = Recorded By, (t) = Taken By, (c) = Cosigned By    Initials Name Provider Type    Kelle Taylor OT Occupational Therapist        Therapy Charges for Today     Code Description Service Date Service Provider Modifiers Qty    57859075993  OT EVAL LOW COMPLEXITY 2 12/16/2019 Kelle Edwards OT CATY, KX 1    47036007286  OT THER PROC EA 15 MIN 12/17/2019 Kelle Edwards OT CATY KX 1    [id number]  OT THERAPEUTIC ACT EA 15 MIN 12/17/2019 Kelle Edwards OT CATY, KX 1               Kelle Edwards OT  12/17/2019

## 2019-12-17 NOTE — PLAN OF CARE
Problem: NPPV/CPAP (Adult)  Goal: Signs and Symptoms of Listed Potential Problems Will be Absent, Minimized or Managed (NPPV/CPAP)  Outcome: Ongoing (interventions implemented as appropriate)  Flowsheets (Taken 12/16/2019 0423 by Rebeca Corbett, RN)  Problems Assessed (NPPV/CPAP): all  Problems Present (NPPV/CPAP): none

## 2019-12-17 NOTE — PLAN OF CARE
Problem: Patient Care Overview  Goal: Plan of Care Review  Outcome: Ongoing (interventions implemented as appropriate)  Flowsheets (Taken 12/17/2019 0441)  Progress: improving  Plan of Care Reviewed With: patient  Outcome Summary: VSS, rested well, wore bipap throughout night, continues to have cough, sputum specimen collected per RT

## 2019-12-17 NOTE — PROGRESS NOTES
[facility]    [facility]    Name:  Jani Pena   Age:  69 y.o.  Sex:  male  :  1950  MRN:  [id number]   Visit Number:  [id number]  Admission Date:  2019  Date Of Service:  19  Primary Care Physician:  Miguel Rojas MD     LOS: 4 days :  Patient Care Team:  Miguel Rojas MD as PCP - General  Miguel Rojas MD as PCP - Family Medicine  Ulysses Bass MD as Cardiologist (Cardiology):    Chief Complaint:      Follow-up on cough, COPD    Subjective / Interval History:     Patient states overall he is much improved since being admitted, still with a significant cough.  He has had some exertional shortness of breath.  No fevers or chills.  No chest pains or palpitations.    Prior work-up:  69-year-old gentleman with history of COPD on 4 L of oxygen, BiPAP, and chronic diastolic heart failure who presented to the emergency room on 2019 with cough and shortness of breath.  He had evidence of urinary frequency as well as evidence of urinary tract infection.  He had a negative chest x-ray without evidence of pneumonia.  Urinary tract infection grew Corynebacterium for which she has been treated with Levaquin.  He was placed on DuoNeb treatments in addition to Solu-Medrol.  He has improved over the last couple of days.    Review of Systems:     General ROS: Patient denies any fevers, chills or loss of consciousness.  Respiratory ROS: Cough and shortness of breath  Cardiovascular ROS: Denies chest pain or palpitations. No history of exertional chest pain.  Gastrointestinal ROS: Denies nausea and vomiting. Denies any abdominal pain. No diarrhea.  Neurological ROS: Denies any focal weakness. No loss of consciousness. Denies any numbness.  Dermatological ROS: Denies any redness or pruritis.    Vital Signs:    Temp:  [98 °F (36.7 °C)-98.5 °F (36.9 °C)] 98 °F (36.7 °C)  Heart Rate:  [64-98] 93  Resp:  [16-22] 18  BP: (111-153)/(57-80) 131/70    Intake and  output:    I/O last 3 completed shifts:  In: 1200 [P.O.:1200]  Out: 2525 [Urine:2525]  I/O this shift:  In: 240 [P.O.:240]  Out: 1000 [Urine:1000]    Physical Examination:    General Appearance:  Alert and cooperative, not in any acute distress.   Head:  Atraumatic and normocephalic, without obvious abnormality.   Eyes:          PERRLA, conjunctivae and sclerae normal, no Icterus. No pallor. Extraocular movements are within normal limits.   Neck: Supple, trachea midline, no thyromegaly.   Lungs:   Breath sounds heard bilaterally equally.  Scattered rhonchi, faint wheezes   Heart:  Normal S1 and S2, no murmur, no gallop, no rub. No JVD   Abdomen:   Normal bowel sounds, no masses, no organomegaly. Soft, non-tender, non-distended, no guarding, no rebound tenderness.   Extremities: Moves all extremities well, no edema, no cyanosis, no clubbing.   Skin: Bruising noted upper extermities   Neurologic: Awake, alert and oriented times 3. Moves all 4 extremities equally.     Laboratory results:    Results from last 7 days   Lab Units 12/17/19  0649 12/15/19  0541 12/14/19  0612 12/13/19  1838   SODIUM mmol/L 143 143 139 141   POTASSIUM mmol/L 4.3 4.1 4.6 4.3   CHLORIDE mmol/L 97* 97* 95* 94*   CO2 mmol/L 36.2* 33.4* 32.4* 33.0*   BUN mg/dL 37* 38* 42* 46*   CREATININE mg/dL 1.61* 1.76* 2.14* 2.31*   CALCIUM mg/dL 9.5 9.1 9.4 9.5   BILIRUBIN mg/dL  --   --   --  0.8   ALK PHOS U/L  --   --   --  75   ALT (SGPT) U/L  --   --   --  12   AST (SGOT) U/L  --   --   --  24   GLUCOSE mg/dL 123* 139* 184* 109*     Results from last 7 days   Lab Units 12/17/19  0649 12/15/19  0541 12/14/19  0715   WBC 10*3/mm3 10.24 10.69 5.53   HEMOGLOBIN g/dL 10.2* 9.3* 9.6*   HEMATOCRIT % 34.0* 30.2* 30.7*   PLATELETS 10*3/mm3 235 210 193         Results from last 7 days   Lab Units 12/14/19  0612 12/13/19  2228 12/13/19  1838   TROPONIN T ng/mL 0.017 0.030 0.034*     Results from last 7 days   Lab Units 12/13/19  2321 12/13/19  2312 12/13/19  1938    BLOODCX  No growth at 3 days No growth at 3 days  --    URINECX   --   --  50,000 CFU/mL Corynebacterium species*       I have reviewed the patient's laboratory results.    Radiology results:    Imaging Results (Last 24 Hours)     ** No results found for the last 24 hours. **          I have reviewed the patient's radiology reports.    Medication Review:     I have reviewed the patients active and prn medications.       Acute on chronic respiratory failure with hypercapnia (CMS/HCC)    Atrial fibrillation (CMS/HCC)    Chronic kidney disease    Steroid-dependent COPD (CMS/[facility])    Essential hypertension    PEDRO on CPAP    COPD with exacerbation (CMS/[facility])    Impaired mobility and ADLs    Chronic diastolic congestive heart failure (CMS/[facility])    Cardiac pacemaker in situ      Assessment:    1.  Acute on chronic hypoxic/hypercapnic respiratory failure, present on admission, improving  2.  COPD exacerbation, present on admission, acute and improving  3.  Acute bronchitis, present mission, improving  4.  Sick sinus syndrome, status post pacemaker, chronic and stable  5.  Dehydration, present mission, improved  6.  Debility, present on admission, PT evaluation  7.  Acute metabolic encephalopathy, present admission, improved  8.  Acute urinary tract infection, present on admission, improved  9.  Acute on chronic renal failure, stage III, improved    Plan:    Patient is overall improved.  Labs are unremarkable.  Repeat chest x-ray was unremarkable yesterday.  I discussed will transition to oral steroids over the next 24 hours as well as adjust his nebulizer treatments and bronchodilators.  We will continue Levaquin.  Continue using BiPAP as tolerated.  After discussion with patient, he seems to have everything ready for discharge to assisted living facility.  Advised that we will likely be discharged tomorrow pending improvement in clinical condition.    Iris Alas DO  12/17/19  2:25 PM    Dictated utilizing Dragon  dictation.

## 2019-12-17 NOTE — PLAN OF CARE
Problem: Patient Care Overview  Goal: Plan of Care Review  Outcome: Ongoing (interventions implemented as appropriate)  Note:   Pt treatment completed with pt ambulating 2x56' cga-sba with rollator .  See flowsheet for details.

## 2019-12-18 VITALS
TEMPERATURE: 97.4 F | OXYGEN SATURATION: 95 % | RESPIRATION RATE: 16 BRPM | SYSTOLIC BLOOD PRESSURE: 138 MMHG | HEIGHT: 68 IN | WEIGHT: 162.8 LBS | DIASTOLIC BLOOD PRESSURE: 71 MMHG | BODY MASS INDEX: 24.67 KG/M2 | HEART RATE: 83 BPM

## 2019-12-18 PROBLEM — J96.22 ACUTE ON CHRONIC RESPIRATORY FAILURE WITH HYPERCAPNIA (HCC): Status: RESOLVED | Noted: 2019-12-13 | Resolved: 2019-12-18

## 2019-12-18 LAB
BACTERIA SPEC AEROBE CULT: NORMAL
BACTERIA SPEC AEROBE CULT: NORMAL

## 2019-12-18 PROCEDURE — 94660 CPAP INITIATION&MGMT: CPT

## 2019-12-18 PROCEDURE — 94799 UNLISTED PULMONARY SVC/PX: CPT

## 2019-12-18 PROCEDURE — 99238 HOSP IP/OBS DSCHRG MGMT 30/<: CPT | Performed by: FAMILY MEDICINE

## 2019-12-18 PROCEDURE — 63710000001 PREDNISONE PER 1 MG: Performed by: FAMILY MEDICINE

## 2019-12-18 RX ORDER — GUAIFENESIN 600 MG/1
600 TABLET, EXTENDED RELEASE ORAL 2 TIMES DAILY
Qty: 20 TABLET | Refills: 0 | Status: SHIPPED | OUTPATIENT
Start: 2019-12-18 | End: 2020-01-07

## 2019-12-18 RX ORDER — LEVOFLOXACIN 250 MG/1
250 TABLET ORAL DAILY
Qty: 3 TABLET | Refills: 0 | Status: SHIPPED | OUTPATIENT
Start: 2019-12-19 | End: 2019-12-18

## 2019-12-18 RX ORDER — PREDNISONE 10 MG/1
20 TABLET ORAL 2 TIMES DAILY
Start: 2019-12-18 | End: 2019-12-18 | Stop reason: SDUPTHER

## 2019-12-18 RX ORDER — GUAIFENESIN 600 MG/1
600 TABLET, EXTENDED RELEASE ORAL 2 TIMES DAILY
Qty: 20 TABLET | Refills: 0 | Status: SHIPPED | OUTPATIENT
Start: 2019-12-18 | End: 2019-12-18

## 2019-12-18 RX ORDER — LEVOFLOXACIN 250 MG/1
250 TABLET ORAL DAILY
Qty: 3 TABLET | Refills: 0 | Status: SHIPPED | OUTPATIENT
Start: 2019-12-19 | End: 2019-12-22

## 2019-12-18 RX ORDER — PREDNISONE 10 MG/1
20 TABLET ORAL 2 TIMES DAILY
Qty: 14 TABLET | Refills: 0 | Status: SHIPPED | OUTPATIENT
Start: 2019-12-18 | End: 2019-12-28 | Stop reason: HOSPADM

## 2019-12-18 RX ADMIN — LISINOPRIL 5 MG: 5 TABLET ORAL at 09:24

## 2019-12-18 RX ADMIN — IPRATROPIUM BROMIDE AND ALBUTEROL SULFATE 3 ML: .5; 3 SOLUTION RESPIRATORY (INHALATION) at 07:22

## 2019-12-18 RX ADMIN — ASPIRIN 81 MG: 81 TABLET, COATED ORAL at 09:24

## 2019-12-18 RX ADMIN — ESCITALOPRAM OXALATE 20 MG: 20 TABLET ORAL at 09:24

## 2019-12-18 RX ADMIN — AMLODIPINE BESYLATE 5 MG: 5 TABLET ORAL at 09:24

## 2019-12-18 RX ADMIN — PREDNISONE 40 MG: 20 TABLET ORAL at 09:24

## 2019-12-18 RX ADMIN — CLOPIDOGREL BISULFATE 75 MG: 75 TABLET ORAL at 09:24

## 2019-12-18 RX ADMIN — IPRATROPIUM BROMIDE AND ALBUTEROL SULFATE 3 ML: .5; 3 SOLUTION RESPIRATORY (INHALATION) at 00:36

## 2019-12-18 RX ADMIN — FINASTERIDE 5 MG: 5 TABLET, FILM COATED ORAL at 09:24

## 2019-12-18 RX ADMIN — FUROSEMIDE 20 MG: 20 TABLET ORAL at 09:24

## 2019-12-18 RX ADMIN — LIDOCAINE 1 PATCH: 50 PATCH CUTANEOUS at 09:24

## 2019-12-18 RX ADMIN — BUDESONIDE 0.5 MG: 0.5 INHALANT RESPIRATORY (INHALATION) at 07:22

## 2019-12-18 RX ADMIN — LEVOFLOXACIN 250 MG: 500 TABLET, FILM COATED ORAL at 09:24

## 2019-12-18 RX ADMIN — PANTOPRAZOLE SODIUM 40 MG: 40 TABLET, DELAYED RELEASE ORAL at 09:24

## 2019-12-18 RX ADMIN — GUAIFENESIN 600 MG: 600 TABLET, EXTENDED RELEASE ORAL at 09:24

## 2019-12-18 NOTE — PROGRESS NOTES
Case Management Discharge Note                Destination      No service has been selected for the patient.      Durable Medical Equipment      No service has been selected for the patient.      Dialysis/Infusion      No service has been selected for the patient.      Home Medical Care - Selection Complete      Service Provider Request Status Selected Services Address Phone Number Fax Number    [address] [address] [address] [facility] [address] [address] [phone]      Therapy      No service has been selected for the patient.      Community Resources      No service has been selected for the patient.        Transportation Services  Private: Car    Final Discharge Disposition Code: 06 - home with home health care

## 2019-12-18 NOTE — DISCHARGE SUMMARY
[facility]   DISCHARGE SUMMARY      Name:  Jani Pena   Age:  69 y.o.  Sex:  male  :  1950  MRN:  [id number]   Visit Number:  [id number]    Admission Date:  2019  Date of Discharge:  2019  Primary Care Physician:  Miguel Rojas MD    Important issues to note:    NONE    Discharge Diagnoses:           Atrial fibrillation (CMS/HCC)    Chronic kidney disease    Steroid-dependent COPD (CMS/HCC)    Essential hypertension    PEDRO on CPAP    COPD with exacerbation (CMS/HCC)    Impaired mobility and ADLs    Chronic diastolic congestive heart failure (CMS/HCC)    Cardiac pacemaker in situ      Presenting Problem:    Acute on chronic respiratory failure with hypercapnia (CMS/HCC) [J96.22]  Acute on chronic respiratory failure with hypercapnia (CMS/HCC) [J96.22]     Consults:     Consults     No orders found from 2019 to 2019.        Consulting Physician(s)             None            Procedures Performed:    None       History of presenting illness:    Patient feels well this morning.  Admits to a nonproductive cough.  No chest pains or palpitations.  No fevers or chills.  He is done well with his BiPAP overnight in addition to wearing his oxygen during the day.  I reviewed plan for discharge today.  He is agreeable.  I reviewed his medications with him.  All questions answered.    Hospital Course:    69-year-old gentleman with history of COPD on 4 L of oxygen, BiPAP, and chronic diastolic heart failure who presented to the emergency room on 2019 with cough and shortness of breath.  He had evidence of urinary frequency as well as evidence of urinary tract infection.  He had a negative chest x-ray without evidence of pneumonia.  Urinary tract infection grew Corynebacterium for which she has been treated with Levaquin.  He was placed on DuoNeb treatments in addition to Solu-Medrol.  He has improved over the last couple of days.  He has been weaned to  oral prednisone.  He should complete a prednisone burst over the next 7 days and then resume his 10 mg of prednisone daily.  He should continue his inhalers and nebulizer treatments as previously prescribed.  He should continue use of his BiPAP at night.  He will be on Levaquin for 3 more days.  Signs and symptoms that necessitate return to the hospital were discussed.  All questions answered at time of visit.     Vital Signs:    Temp:  [97.4 °F (36.3 °C)-98.2 °F (36.8 °C)] 97.4 °F (36.3 °C)  Heart Rate:  [64-95] 83  Resp:  [16-18] 16  BP: (119-138)/(54-73) 138/71    Physical Exam:    General Appearance:  Alert and cooperative, not in any acute distress.   Head:  Atraumatic and normocephalic, without obvious abnormality.   Eyes:          PERRLA, conjunctivae and sclerae normal, no Icterus. No pallor. Extraocular movements are within normal limits.   Ears:  Ears appear intact with no abnormalities noted.   Throat: No oral lesions, no thrush, oral mucosa moist.   Neck: Supple, trachea midline, no thyromegaly, no carotid bruit.   Back:   No tenderness to palpation,   range of motion normal.   Lungs:   Breath sounds heard bilaterally equally.  Crackles.   Heart:  Normal S1 and S2, no murmur, no gallop, no rub. No JVD.   Abdomen:   Normal bowel sounds, no masses, no organomegaly. Soft, non-tender, non-distended, no guarding, no rebound tenderness.   Extremities: Moves all extremities well, no edema, no cyanosis, no clubbing.   Pulses: Pulses palpable and equal bilaterally.   Skin: No bleeding, bruising or rash.   Lymph nodes: No palpable adenopathy.   Neurologic: Alert and oriented x 3. Moves all four limbs equally. No tremors. No facial asymetry.     Pertinent Lab Results:     Results from last 7 days   Lab Units 12/17/19  0649 12/15/19  0541 12/14/19  0612 12/13/19  1838   SODIUM mmol/L 143 143 139 141   POTASSIUM mmol/L 4.3 4.1 4.6 4.3   CHLORIDE mmol/L 97* 97* 95* 94*   CO2 mmol/L 36.2* 33.4* 32.4* 33.0*   BUN mg/dL  37* 38* 42* 46*   CREATININE mg/dL 1.61* 1.76* 2.14* 2.31*   CALCIUM mg/dL 9.5 9.1 9.4 9.5   BILIRUBIN mg/dL  --   --   --  0.8   ALK PHOS U/L  --   --   --  75   ALT (SGPT) U/L  --   --   --  12   AST (SGOT) U/L  --   --   --  24   GLUCOSE mg/dL 123* 139* 184* 109*     Results from last 7 days   Lab Units 12/17/19  0649 12/15/19  0541 12/14/19  0715   WBC 10*3/mm3 10.24 10.69 5.53   HEMOGLOBIN g/dL 10.2* 9.3* 9.6*   HEMATOCRIT % 34.0* 30.2* 30.7*   PLATELETS 10*3/mm3 235 210 193         Results from last 7 days   Lab Units 12/14/19  0612 12/13/19  2228 12/13/19  1838   TROPONIN T ng/mL 0.017 0.030 0.034*     Results from last 7 days   Lab Units 12/13/19  1838   PROBNP pg/mL 2,393.0*         Results from last 7 days   Lab Units 12/13/19  1838   LIPASE U/L 18     Results from last 7 days   Lab Units 12/13/19 2002   PH, ARTERIAL pH units 7.383   PO2 ART mm Hg 93.5   PCO2, ARTERIAL mm Hg 61.8*   HCO3 ART mmol/L 36.8*     Results from last 7 days   Lab Units 12/13/19  1938 12/13/19  1729   COLOR UA  Yellow Yellow   SPECIFIC GRAVITY, URINE   --  1.020   GLUCOSE UA  Negative  --    KETONES UA  Negative Negative   LEUKOCYTES UA  Small (1+)* Large (3+)*   PH, URINE  5.5 5.5   BILIRUBIN UA  Negative Negative   UROBILINOGEN UA  0.2 E.U./dL Normal     Pain Management Panel     Pain Management Panel Latest Ref Rng & Units 9/1/2019 8/29/2019    CREATININE UR mg/dL 90.1 -    AMPHETAMINES SCREEN, URINE Negative - Negative    BARBITURATES SCREEN Negative - Negative    BENZODIAZEPINE SCREEN, URINE Negative - Positive(A)    BUPRENORPHINEUR Negative - Negative    COCAINE SCREEN, URINE Negative - Negative    METHADONE SCREEN, URINE Negative - Negative    METHAMPHETAMINEUR Negative - Negative        Results from last 7 days   Lab Units 12/13/19  2321 12/13/19  2312 12/13/19  1938   BLOODCX  No growth at 4 days No growth at 4 days  --    URINECX   --   --  50,000 CFU/mL Corynebacterium species*       Pertinent Radiology  Results:    Imaging Results (All)     Procedure Component Value Units Date/Time    XR Chest 1 View [[id number]] Collected:  12/16/19 1018     Updated:  12/16/19 1026    Narrative:       PROCEDURE: XR CHEST 1 VW-     HISTORY: Cough, dyspnea, pneumonia; J96.22-Acute and chronic respiratory  failure with hypercapnia; N30.00-Acute cystitis without hematuria;  N17.9-Acute kidney failure, unspecified; N18.9-Chronic kidney disease,  unspecified     COMPARISON: 02/13/2019.     FINDINGS: A left subclavian pacemaker is in place. A nerve stimulator  overlies the right chest wall. The heart is normal in size. The  mediastinum is unremarkable. There are chronic interstitial lung  changes. No focal opacities were effusions are evident. There is no  pneumothorax.  There are no acute osseous abnormalities.       Impression:       No acute cardiopulmonary process.     Continued followup is recommended.     This report was finalized on 12/16/2019 10:19 AM by Richelle Patel M.D..    XR Chest 2 View [[id number]] Collected:  12/13/19 1914     Updated:  12/13/19 1915    Narrative:       FINAL REPORT    CLINICAL HISTORY:  . cough, soa, confusion    FINDINGS:  Interstitial prominence is consistent with chronic lung disease.  There is no acute infiltrate, suspicious mass or pleural  effusion.  Heart size within normal limits.  Fullness of the AP  window is nonspecific.  Aortic knob is calcified.  Pacemaker and  lower cervical neural stimulators present.      Impression:       Chronic changes but no acute disease    Authenticated by Geovany Mullins MD on 12/13/2019 07:14:37 PM          Condition on Discharge:      Stable.    Code status during the hospital stay:    Code Status and Medical Interventions:   Ordered at: 12/15/19 0756     Level Of Support Discussed With:    Patient     Code Status:    No CPR     Medical Interventions (Level of Support Prior to Arrest):    Full     Comments:    advanced directive       Discharge  Disposition:    Home or Self Care    Discharge Medications:       Discharge Medications      New Medications      Instructions Start Date   guaiFENesin 600 MG 12 hr tablet  Commonly known as:  MUCINEX   600 mg, Oral, 2 Times Daily      levoFLOXacin 250 MG tablet  Commonly known as:  LEVAQUIN   250 mg, Oral, Daily   Start Date:  December 19, 2019        Changes to Medications      Instructions Start Date   Fluticasone Furoate-Vilanterol 200-25 MCG/INH inhaler  Commonly known as:  BREO ELLIPTA  What changed:  how much to take   1 puff, Inhalation, Daily - RT      predniSONE 10 MG tablet  Commonly known as:  DELTASONE  What changed:    · how much to take  · when to take this   20 mg, Oral, 2 Times Daily         Continue These Medications      Instructions Start Date   alfuzosin 10 MG 24 hr tablet  Commonly known as:  UROXATRAL   10 mg, Oral, Daily      ALPRAZolam 0.5 MG tablet  Commonly known as:  XANAX   0.5 mg, Oral, Nightly PRN      amLODIPine 5 MG tablet  Commonly known as:  NORVASC   5 mg, Oral, Every 24 Hours Scheduled      ASPIRIN ADULT LOW STRENGTH 81 MG EC tablet  Generic drug:  aspirin   TAKE ONE TABLET BY MOUTH DAILY      atorvastatin 10 MG tablet  Commonly known as:  LIPITOR   10 mg, Oral, Nightly      CALCIUM 600-D 600-400 MG-UNIT tablet  Generic drug:  Calcium Carbonate-Vitamin D3   1 tablet, Oral, 2 Times Daily      carboxymethylcellulose 0.5 % solution  Commonly known as:  REFRESH PLUS   1 drop, Both Eyes, 3 Times Daily PRN      clopidogrel 75 MG tablet  Commonly known as:  PLAVIX   75 mg, Oral, Daily      cyclobenzaprine 5 MG tablet  Commonly known as:  FLEXERIL   5 mg, Oral, Every 8 Hours Scheduled      docusate sodium 100 MG capsule   100 mg, Oral, 2 Times Daily      escitalopram 20 MG tablet  Commonly known as:  LEXAPRO   20 mg, Oral, Daily      finasteride 5 MG tablet  Commonly known as:  PROSCAR   5 mg, Oral, Daily      furosemide 20 MG tablet  Commonly known as:  LASIX   20 mg, Oral, Daily       ipratropium-albuterol 0.5-2.5 mg/3 ml nebulizer  Commonly known as:  DUO-NEB   3 mL, Nebulization, Every 6 Hours - RT      iron polysaccharides 150 MG capsule  Commonly known as:  NIFEREX   150 mg, Oral, Daily      lidocaine 5 %  Commonly known as:  LIDODERM   1 patch, Transdermal, Every 24 Hours Scheduled, Remove & Discard patch within 12 hours or as directed by MD      lisinopril 5 MG tablet  Commonly known as:  PRINIVIL,ZESTRIL   5 mg, Oral, Daily      melatonin 5 MG tablet tablet   ONE BY MOUTH EVERY NIGHT AT BEDTIME      metoprolol tartrate 25 MG tablet  Commonly known as:  LOPRESSOR   TAKE ONE TABLET BY MOUTH TWICE A DAY      mirtazapine 15 MG tablet  Commonly known as:  REMERON   15 mg, Oral, Nightly      multivitamin with minerals tablet tablet   1 tablet, Oral, Daily      O2  Commonly known as:  OXYGEN   4 L/min, Inhalation, Once, Walking on 4L and sitting 3L      oxybutynin 5 MG tablet  Commonly known as:  DITROPAN   5 mg, Oral, Nightly      pantoprazole 40 MG EC tablet  Commonly known as:  PROTONIX   TAKE ONE TABLET BY MOUTH DAILY      polyethylene glycol pack packet  Commonly known as:  MIRALAX   17 g, Oral, Daily PRN      traZODone 50 MG tablet  Commonly known as:  DESYREL   50 mg, Oral, Nightly         Stop These Medications    baclofen 10 MG tablet  Commonly known as:  LIORESAL     MYRBETRIQ 50 MG tablet sustained-release 24 hour 24 hr tablet  Generic drug:  Mirabegron ER     spironolactone 25 MG tablet  Commonly known as:  ALDACTONE            Discharge Diet:   Cardiac      Activity at Discharge:   As tolerated      Follow-up Appointments:    Additional Instructions for the Follow-ups that You Need to Schedule     Ambulatory Referral to Home Health   As directed      Face to Face Visit Date:  12/18/2019    Follow-up provider for Plan of Care?:  I treated the patient in an acute care facility and will not continue treatment after discharge.    Follow-up provider:  RENNY CHOUDHARY [[id number]]     Reason/Clinical Findings:  RESUMPTION OF CARE, debility    Describe mobility limitations that make leaving home difficult:  debility    Nursing/Therapeutic Services Requested:  Skilled Nursing Physical Therapy Occupational Therapy    Skilled nursing orders:  COPD management    PT orders:  Strengthening    Occupational orders:  Activities of daily living    Frequency:  1 Week 1           Follow-up Information     Miguel Choudhary MD Follow up on 12/24/2019.    Specialty:  Internal Medicine  Why:  @ 9:15 a m  Contact information:  [address] [place] [id number]  [phone]                   Future Appointments   Date Time Provider Department Center   12/24/2019  9:30 AM Miguel Choudhary MD [facility] None       Additional Instructions for the Follow-ups that You Need to Schedule     Ambulatory Referral to Home Health   As directed      Face to Face Visit Date:  12/18/2019    Follow-up provider for Plan of Care?:  I treated the patient in an acute care facility and will not continue treatment after discharge.    Follow-up provider:  MIGUEL CHOUDHARY [[id number]]    Reason/Clinical Findings:  RESUMPTION OF CARE, debility    Describe mobility limitations that make leaving home difficult:  debility    Nursing/Therapeutic Services Requested:  Skilled Nursing Physical Therapy Occupational Therapy    Skilled nursing orders:  COPD management    PT orders:  Strengthening    Occupational orders:  Activities of daily living    Frequency:  1 Week 1               Test Results Pending at Discharge:     Order Current Status    Blood Culture With KATE - Blood, Arm, Right Preliminary result    Blood Culture With KATE - Blood, Hand, Right Preliminary result             Iris Alas DO  12/18/19  11:07 AM    Time spent: 22 minutes    Dictated utilizing Dragon dictation.

## 2019-12-18 NOTE — PLAN OF CARE
Problem: Patient Care Overview  Goal: Plan of Care Review  Outcome: Ongoing (interventions implemented as appropriate)  Flowsheets (Taken 12/18/2019 5292)  Progress: improving  Plan of Care Reviewed With: patient  Outcome Summary: rested well, VSS, wore bipap throughout night

## 2019-12-18 NOTE — PLAN OF CARE
Problem: Patient Care Overview  Goal: Plan of Care Review  Outcome: Outcome(s) achieved  Flowsheets  Taken 12/18/2019 1218 by Roxi Graham RN  Progress: improving  Outcome Summary: Being discharged  Taken 12/18/2019 0532 by Rebeca Corbett RN  Plan of Care Reviewed With: patient

## 2019-12-18 NOTE — PROGRESS NOTES
Continued Stay Note   [facility]     Patient Name: Jani Pena  MRN: [id number]  Today's Date: 12/18/2019    Admit Date: 12/13/2019    Discharge Plan     Row Name 12/18/19 1058       Plan    Plan  Home with HH    Patient/Family in Agreement with Plan  yes    Plan Comments  F/U with pt re DCP.  Pt confirms that he has an apt at [facility], but may go to his house today.  His friend Elham will transport.  He states that he will just need to call when he is ready for discharge.  Pt reports that he had [facility] prior to admission and would like to have them back.   Dr. Alas updated.    Received new HH order.  Called [facility]; spoke with Wandy.  Wandy confirms that pt is current and accepts referral.        Discharge Codes    No documentation.       Expected Discharge Date and Time     Expected Discharge Date Expected Discharge Time    Dec 18, 2019             Danielle Lundy RN

## 2019-12-19 ENCOUNTER — TRANSITIONAL CARE MANAGEMENT TELEPHONE ENCOUNTER (OUTPATIENT)
Dept: INTERNAL MEDICINE | Facility: CLINIC | Age: 69
End: 2019-12-19

## 2019-12-19 ENCOUNTER — TELEPHONE (OUTPATIENT)
Dept: INTERNAL MEDICINE | Facility: CLINIC | Age: 69
End: 2019-12-19

## 2019-12-19 ENCOUNTER — READMISSION MANAGEMENT (OUTPATIENT)
Dept: CALL CENTER | Facility: HOSPITAL | Age: 69
End: 2019-12-19

## 2019-12-19 RX ORDER — ALPRAZOLAM 0.5 MG/1
0.5 TABLET ORAL NIGHTLY PRN
Qty: 30 TABLET | Refills: 1 | Status: SHIPPED | OUTPATIENT
Start: 2019-12-19 | End: 2020-02-03

## 2019-12-19 NOTE — OUTREACH NOTE
Spoke with pt, he is not 100% but is feeling better. Appetite is ok. No fever, chills. Cough and SOB improving. Confirmed receipt and understanding of d/c orders and medications. Confirmed with pt TCM [facility] with Dr Rojas on 12/24/19.

## 2019-12-19 NOTE — OUTREACH NOTE
Prep Survey      Responses   Facility patient discharged from?  [facility]   Is patient eligible?  Yes   Discharge diagnosis  Afib., CKD, steroid-dependent COPD, essential HTN, PEDRO onCPAP, COPD with AE, impaired mobility and ADLs, chronic diastolic CHF, Cardiac PPM in situ   Does the patient have one of the following disease processes/diagnoses(primary or secondary)?  COPD/Pneumonia   Does the patient have Home health ordered?  Yes   What is the Home health agency?   [facility]    Is there a DME ordered?  No   Comments regarding appointments  See AVS   General alerts for this patient  Pt is planning to move to [facility]   Prep survey completed?  Yes          Tomasa Suh RN

## 2019-12-19 NOTE — TELEPHONE ENCOUNTER
Akilah called from [facility] needing an order for pt's ALPRAZolam (XANAX) 0.5 MG tablet and a bedside order for nebulizer treatment.    Callback [phone]

## 2019-12-20 ENCOUNTER — READMISSION MANAGEMENT (OUTPATIENT)
Dept: CALL CENTER | Facility: HOSPITAL | Age: 69
End: 2019-12-20

## 2019-12-20 NOTE — OUTREACH NOTE
COPD/PN Week 1 Survey      Responses   Facility patient discharged from?  [facility]   Does the patient have one of the following disease processes/diagnoses(primary or secondary)?  COPD/Pneumonia   Is there a successful TCM telephone encounter documented?  Yes          Poonam Chambers RN

## 2019-12-20 NOTE — TELEPHONE ENCOUNTER
Spoke with keeley from [facility] and she stated that we need written orders to send over for these. I have the fax number will you please write out orders and I will fax them?

## 2019-12-26 ENCOUNTER — APPOINTMENT (OUTPATIENT)
Dept: CT IMAGING | Facility: HOSPITAL | Age: 69
End: 2019-12-26

## 2019-12-26 ENCOUNTER — TELEPHONE (OUTPATIENT)
Dept: INTERNAL MEDICINE | Facility: CLINIC | Age: 69
End: 2019-12-26

## 2019-12-26 ENCOUNTER — APPOINTMENT (OUTPATIENT)
Dept: GENERAL RADIOLOGY | Facility: HOSPITAL | Age: 69
End: 2019-12-26

## 2019-12-26 ENCOUNTER — HOSPITAL ENCOUNTER (INPATIENT)
Facility: HOSPITAL | Age: 69
LOS: 2 days | Discharge: HOME HEALTH | End: 2019-12-28
Attending: EMERGENCY MEDICINE | Admitting: INTERNAL MEDICINE

## 2019-12-26 DIAGNOSIS — N17.9 AKI (ACUTE KIDNEY INJURY) (HCC): Primary | ICD-10-CM

## 2019-12-26 DIAGNOSIS — K92.1 MELENA: ICD-10-CM

## 2019-12-26 DIAGNOSIS — R19.7 DIARRHEA, UNSPECIFIED TYPE: ICD-10-CM

## 2019-12-26 DIAGNOSIS — R77.8 ELEVATED TROPONIN: ICD-10-CM

## 2019-12-26 LAB
A-A DO2: 37 MMHG
ABO GROUP BLD: NORMAL
ABO GROUP BLD: NORMAL
ALBUMIN SERPL-MCNC: 3.8 G/DL (ref 3.5–5.2)
ALBUMIN/GLOB SERPL: 1.7 G/DL
ALP SERPL-CCNC: 76 U/L (ref 39–117)
ALT SERPL W P-5'-P-CCNC: 11 U/L (ref 1–41)
ANION GAP SERPL CALCULATED.3IONS-SCNC: 10.8 MMOL/L (ref 5–15)
ARTERIAL PATENCY WRIST A: POSITIVE
AST SERPL-CCNC: 19 U/L (ref 1–40)
ATMOSPHERIC PRESS: 738 MMHG
BASE EXCESS BLDA CALC-SCNC: 8.9 MMOL/L (ref 0–2)
BASOPHILS # BLD AUTO: 0.02 10*3/MM3 (ref 0–0.2)
BASOPHILS NFR BLD AUTO: 0.2 % (ref 0–1.5)
BDY SITE: ABNORMAL
BILIRUB SERPL-MCNC: 0.6 MG/DL (ref 0.2–1.2)
BLD GP AB SCN SERPL QL: NEGATIVE
BUN BLD-MCNC: 61 MG/DL (ref 8–23)
BUN/CREAT SERPL: 24 (ref 7–25)
CALCIUM SPEC-SCNC: 9.6 MG/DL (ref 8.6–10.5)
CHLORIDE SERPL-SCNC: 95 MMOL/L (ref 98–107)
CO2 SERPL-SCNC: 31.2 MMOL/L (ref 22–29)
COHGB MFR BLD: 0.8 % (ref 0–2)
CREAT BLD-MCNC: 2.54 MG/DL (ref 0.76–1.27)
DEPRECATED RDW RBC AUTO: 45.1 FL (ref 37–54)
EOSINOPHIL # BLD AUTO: 0.34 10*3/MM3 (ref 0–0.4)
EOSINOPHIL NFR BLD AUTO: 2.9 % (ref 0.3–6.2)
ERYTHROCYTE [DISTWIDTH] IN BLOOD BY AUTOMATED COUNT: 13.2 % (ref 12.3–15.4)
GAS FLOW AIRWAY: 2 LPM
GFR SERPL CREATININE-BSD FRML MDRD: 25 ML/MIN/1.73
GLOBULIN UR ELPH-MCNC: 2.2 GM/DL
GLUCOSE BLD-MCNC: 104 MG/DL (ref 65–99)
HCO3 BLDA-SCNC: 35.1 MMOL/L (ref 22–28)
HCT VFR BLD AUTO: 34.2 % (ref 37.5–51)
HCT VFR BLD CALC: 33.4 %
HEMOCCULT STL QL: POSITIVE
HGB BLD-MCNC: 10.1 G/DL (ref 13–17.7)
HGB BLD-MCNC: 11 G/DL (ref 13–17.7)
HGB BLDA-MCNC: 10.9 G/DL (ref 12–18)
HOLD SPECIMEN: NORMAL
HOLD SPECIMEN: NORMAL
HOROWITZ INDEX BLD+IHG-RTO: 28 %
IMM GRANULOCYTES # BLD AUTO: 0.06 10*3/MM3 (ref 0–0.05)
IMM GRANULOCYTES NFR BLD AUTO: 0.5 % (ref 0–0.5)
LIPASE SERPL-CCNC: 38 U/L (ref 13–60)
LYMPHOCYTES # BLD AUTO: 1.41 10*3/MM3 (ref 0.7–3.1)
LYMPHOCYTES NFR BLD AUTO: 12.2 % (ref 19.6–45.3)
MCH RBC QN AUTO: 30.6 PG (ref 26.6–33)
MCHC RBC AUTO-ENTMCNC: 32.2 G/DL (ref 31.5–35.7)
MCV RBC AUTO: 95 FL (ref 79–97)
METHGB BLD QL: 1 % (ref 0–1.5)
MODALITY: ABNORMAL
MONOCYTES # BLD AUTO: 1.24 10*3/MM3 (ref 0.1–0.9)
MONOCYTES NFR BLD AUTO: 10.7 % (ref 5–12)
NEUTROPHILS # BLD AUTO: 8.51 10*3/MM3 (ref 1.7–7)
NEUTROPHILS NFR BLD AUTO: 73.5 % (ref 42.7–76)
NOTE: ABNORMAL
NRBC BLD AUTO-RTO: 0 /100 WBC (ref 0–0.2)
NT-PROBNP SERPL-MCNC: 487.9 PG/ML (ref 5–900)
OXYHGB MFR BLDV: 96.2 % (ref 94–99)
PCO2 BLDA: 56 MM HG (ref 35–45)
PCO2 TEMP ADJ BLD: ABNORMAL MM[HG]
PH BLDA: 7.41 PH UNITS (ref 7.3–7.5)
PH, TEMP CORRECTED: ABNORMAL
PLATELET # BLD AUTO: 236 10*3/MM3 (ref 140–450)
PMV BLD AUTO: 9 FL (ref 6–12)
PO2 BLDA: 93.8 MM HG (ref 75–100)
PO2 TEMP ADJ BLD: ABNORMAL MM[HG]
POTASSIUM BLD-SCNC: 4.9 MMOL/L (ref 3.5–5.2)
PROT SERPL-MCNC: 6 G/DL (ref 6–8.5)
RBC # BLD AUTO: 3.6 10*6/MM3 (ref 4.14–5.8)
RH BLD: POSITIVE
RH BLD: POSITIVE
SAO2 % BLDCOA: 98 % (ref 94–100)
SODIUM BLD-SCNC: 137 MMOL/L (ref 136–145)
T&S EXPIRATION DATE: NORMAL
TROPONIN T SERPL-MCNC: 0.03 NG/ML (ref 0–0.03)
TROPONIN T SERPL-MCNC: 0.04 NG/ML (ref 0–0.03)
VENTILATOR MODE: ABNORMAL
WBC NRBC COR # BLD: 11.58 10*3/MM3 (ref 3.4–10.8)
WHOLE BLOOD HOLD SPECIMEN: NORMAL
WHOLE BLOOD HOLD SPECIMEN: NORMAL

## 2019-12-26 PROCEDURE — 84484 ASSAY OF TROPONIN QUANT: CPT | Performed by: INTERNAL MEDICINE

## 2019-12-26 PROCEDURE — 93005 ELECTROCARDIOGRAM TRACING: CPT | Performed by: EMERGENCY MEDICINE

## 2019-12-26 PROCEDURE — 82272 OCCULT BLD FECES 1-3 TESTS: CPT | Performed by: EMERGENCY MEDICINE

## 2019-12-26 PROCEDURE — 71046 X-RAY EXAM CHEST 2 VIEWS: CPT

## 2019-12-26 PROCEDURE — 82805 BLOOD GASES W/O2 SATURATION: CPT

## 2019-12-26 PROCEDURE — 86901 BLOOD TYPING SEROLOGIC RH(D): CPT

## 2019-12-26 PROCEDURE — 83050 HGB METHEMOGLOBIN QUAN: CPT

## 2019-12-26 PROCEDURE — 86850 RBC ANTIBODY SCREEN: CPT | Performed by: INTERNAL MEDICINE

## 2019-12-26 PROCEDURE — 86901 BLOOD TYPING SEROLOGIC RH(D): CPT | Performed by: INTERNAL MEDICINE

## 2019-12-26 PROCEDURE — 83690 ASSAY OF LIPASE: CPT | Performed by: NURSE PRACTITIONER

## 2019-12-26 PROCEDURE — 82375 ASSAY CARBOXYHB QUANT: CPT

## 2019-12-26 PROCEDURE — 80053 COMPREHEN METABOLIC PANEL: CPT | Performed by: EMERGENCY MEDICINE

## 2019-12-26 PROCEDURE — 36600 WITHDRAWAL OF ARTERIAL BLOOD: CPT

## 2019-12-26 PROCEDURE — 94640 AIRWAY INHALATION TREATMENT: CPT

## 2019-12-26 PROCEDURE — 93005 ELECTROCARDIOGRAM TRACING: CPT

## 2019-12-26 PROCEDURE — 84484 ASSAY OF TROPONIN QUANT: CPT | Performed by: EMERGENCY MEDICINE

## 2019-12-26 PROCEDURE — 86900 BLOOD TYPING SEROLOGIC ABO: CPT | Performed by: INTERNAL MEDICINE

## 2019-12-26 PROCEDURE — 85018 HEMOGLOBIN: CPT | Performed by: INTERNAL MEDICINE

## 2019-12-26 PROCEDURE — 86900 BLOOD TYPING SEROLOGIC ABO: CPT

## 2019-12-26 PROCEDURE — 83880 ASSAY OF NATRIURETIC PEPTIDE: CPT | Performed by: EMERGENCY MEDICINE

## 2019-12-26 PROCEDURE — 99222 1ST HOSP IP/OBS MODERATE 55: CPT | Performed by: INTERNAL MEDICINE

## 2019-12-26 PROCEDURE — 85025 COMPLETE CBC W/AUTO DIFF WBC: CPT | Performed by: EMERGENCY MEDICINE

## 2019-12-26 PROCEDURE — 74176 CT ABD & PELVIS W/O CONTRAST: CPT

## 2019-12-26 PROCEDURE — 99284 EMERGENCY DEPT VISIT MOD MDM: CPT

## 2019-12-26 RX ORDER — MORPHINE SULFATE 2 MG/ML
1 INJECTION, SOLUTION INTRAMUSCULAR; INTRAVENOUS EVERY 4 HOURS PRN
Status: DISCONTINUED | OUTPATIENT
Start: 2019-12-26 | End: 2019-12-28 | Stop reason: HOSPADM

## 2019-12-26 RX ORDER — IPRATROPIUM BROMIDE AND ALBUTEROL SULFATE 2.5; .5 MG/3ML; MG/3ML
3 SOLUTION RESPIRATORY (INHALATION)
Status: DISCONTINUED | OUTPATIENT
Start: 2019-12-27 | End: 2019-12-28 | Stop reason: HOSPADM

## 2019-12-26 RX ORDER — IRON POLYSACCHARIDE COMPLEX 150 MG
150 CAPSULE ORAL DAILY
Status: DISCONTINUED | OUTPATIENT
Start: 2019-12-27 | End: 2019-12-28 | Stop reason: HOSPADM

## 2019-12-26 RX ORDER — ACETAMINOPHEN 650 MG/1
650 SUPPOSITORY RECTAL EVERY 4 HOURS PRN
Status: DISCONTINUED | OUTPATIENT
Start: 2019-12-26 | End: 2019-12-28 | Stop reason: HOSPADM

## 2019-12-26 RX ORDER — SODIUM CHLORIDE 9 MG/ML
75 INJECTION, SOLUTION INTRAVENOUS CONTINUOUS
Status: DISCONTINUED | OUTPATIENT
Start: 2019-12-26 | End: 2019-12-28 | Stop reason: HOSPADM

## 2019-12-26 RX ORDER — LOPERAMIDE HYDROCHLORIDE 2 MG/1
2 TABLET ORAL 4 TIMES DAILY PRN
Qty: 12 TABLET | Refills: 0 | Status: SHIPPED | OUTPATIENT
Start: 2019-12-26 | End: 2020-02-03

## 2019-12-26 RX ORDER — IPRATROPIUM BROMIDE AND ALBUTEROL SULFATE 2.5; .5 MG/3ML; MG/3ML
3 SOLUTION RESPIRATORY (INHALATION) ONCE
Status: COMPLETED | OUTPATIENT
Start: 2019-12-26 | End: 2019-12-26

## 2019-12-26 RX ORDER — PANTOPRAZOLE SODIUM 40 MG/10ML
40 INJECTION, POWDER, LYOPHILIZED, FOR SOLUTION INTRAVENOUS EVERY 12 HOURS SCHEDULED
Status: DISCONTINUED | OUTPATIENT
Start: 2019-12-26 | End: 2019-12-28 | Stop reason: HOSPADM

## 2019-12-26 RX ORDER — ACETAMINOPHEN 160 MG/5ML
650 SOLUTION ORAL EVERY 4 HOURS PRN
Status: DISCONTINUED | OUTPATIENT
Start: 2019-12-26 | End: 2019-12-28 | Stop reason: HOSPADM

## 2019-12-26 RX ORDER — SODIUM CHLORIDE 0.9 % (FLUSH) 0.9 %
10 SYRINGE (ML) INJECTION EVERY 12 HOURS SCHEDULED
Status: DISCONTINUED | OUTPATIENT
Start: 2019-12-26 | End: 2019-12-28 | Stop reason: HOSPADM

## 2019-12-26 RX ORDER — ACETAMINOPHEN 325 MG/1
650 TABLET ORAL EVERY 4 HOURS PRN
Status: DISCONTINUED | OUTPATIENT
Start: 2019-12-26 | End: 2019-12-28 | Stop reason: HOSPADM

## 2019-12-26 RX ORDER — ALPRAZOLAM 0.5 MG/1
0.5 TABLET ORAL NIGHTLY PRN
Status: DISCONTINUED | OUTPATIENT
Start: 2019-12-26 | End: 2019-12-28 | Stop reason: HOSPADM

## 2019-12-26 RX ORDER — CHOLECALCIFEROL (VITAMIN D3) 125 MCG
5 CAPSULE ORAL NIGHTLY
Status: DISCONTINUED | OUTPATIENT
Start: 2019-12-26 | End: 2019-12-28 | Stop reason: HOSPADM

## 2019-12-26 RX ORDER — ESCITALOPRAM OXALATE 20 MG/1
20 TABLET ORAL DAILY
Status: DISCONTINUED | OUTPATIENT
Start: 2019-12-27 | End: 2019-12-28 | Stop reason: HOSPADM

## 2019-12-26 RX ORDER — BUDESONIDE AND FORMOTEROL FUMARATE DIHYDRATE 80; 4.5 UG/1; UG/1
2 AEROSOL RESPIRATORY (INHALATION)
Status: DISCONTINUED | OUTPATIENT
Start: 2019-12-26 | End: 2019-12-28 | Stop reason: HOSPADM

## 2019-12-26 RX ORDER — PROMETHAZINE HYDROCHLORIDE 25 MG/1
25 TABLET ORAL EVERY 6 HOURS PRN
Qty: 15 TABLET | Refills: 0 | Status: SHIPPED | OUTPATIENT
Start: 2019-12-26 | End: 2019-12-28 | Stop reason: HOSPADM

## 2019-12-26 RX ORDER — ONDANSETRON 2 MG/ML
4 INJECTION INTRAMUSCULAR; INTRAVENOUS EVERY 6 HOURS PRN
Status: DISCONTINUED | OUTPATIENT
Start: 2019-12-26 | End: 2019-12-28 | Stop reason: HOSPADM

## 2019-12-26 RX ORDER — CYCLOBENZAPRINE HCL 10 MG
5 TABLET ORAL EVERY 8 HOURS SCHEDULED
Status: DISCONTINUED | OUTPATIENT
Start: 2019-12-26 | End: 2019-12-28 | Stop reason: HOSPADM

## 2019-12-26 RX ORDER — ATORVASTATIN CALCIUM 10 MG/1
10 TABLET, FILM COATED ORAL NIGHTLY
Status: DISCONTINUED | OUTPATIENT
Start: 2019-12-26 | End: 2019-12-28 | Stop reason: HOSPADM

## 2019-12-26 RX ORDER — SODIUM CHLORIDE 0.9 % (FLUSH) 0.9 %
10 SYRINGE (ML) INJECTION AS NEEDED
Status: DISCONTINUED | OUTPATIENT
Start: 2019-12-26 | End: 2019-12-28 | Stop reason: HOSPADM

## 2019-12-26 RX ORDER — OXYBUTYNIN CHLORIDE 5 MG/1
5 TABLET ORAL NIGHTLY
Status: DISCONTINUED | OUTPATIENT
Start: 2019-12-26 | End: 2019-12-28 | Stop reason: HOSPADM

## 2019-12-26 RX ORDER — TAMSULOSIN HYDROCHLORIDE 0.4 MG/1
0.4 CAPSULE ORAL NIGHTLY
Status: DISCONTINUED | OUTPATIENT
Start: 2019-12-26 | End: 2019-12-28 | Stop reason: HOSPADM

## 2019-12-26 RX ORDER — NALOXONE HCL 0.4 MG/ML
0.4 VIAL (ML) INJECTION
Status: DISCONTINUED | OUTPATIENT
Start: 2019-12-26 | End: 2019-12-28 | Stop reason: HOSPADM

## 2019-12-26 RX ORDER — AMLODIPINE BESYLATE 5 MG/1
5 TABLET ORAL
Status: DISCONTINUED | OUTPATIENT
Start: 2019-12-27 | End: 2019-12-28 | Stop reason: HOSPADM

## 2019-12-26 RX ORDER — MIRTAZAPINE 15 MG/1
15 TABLET, FILM COATED ORAL NIGHTLY
Status: DISCONTINUED | OUTPATIENT
Start: 2019-12-26 | End: 2019-12-28 | Stop reason: HOSPADM

## 2019-12-26 RX ADMIN — TAMSULOSIN HYDROCHLORIDE 0.4 MG: 0.4 CAPSULE ORAL at 22:27

## 2019-12-26 RX ADMIN — ACETAMINOPHEN 650 MG: 325 TABLET, FILM COATED ORAL at 22:02

## 2019-12-26 RX ADMIN — ATORVASTATIN CALCIUM 10 MG: 10 TABLET, FILM COATED ORAL at 22:28

## 2019-12-26 RX ADMIN — SODIUM CHLORIDE 75 ML/HR: 9 INJECTION, SOLUTION INTRAVENOUS at 22:27

## 2019-12-26 RX ADMIN — SODIUM CHLORIDE, PRESERVATIVE FREE 10 ML: 5 INJECTION INTRAVENOUS at 22:51

## 2019-12-26 RX ADMIN — SODIUM CHLORIDE 1000 ML: 9 INJECTION, SOLUTION INTRAVENOUS at 19:22

## 2019-12-26 RX ADMIN — PANTOPRAZOLE SODIUM 40 MG: 40 INJECTION, POWDER, FOR SOLUTION INTRAVENOUS at 22:27

## 2019-12-26 RX ADMIN — IPRATROPIUM BROMIDE AND ALBUTEROL SULFATE 3 ML: .5; 3 SOLUTION RESPIRATORY (INHALATION) at 18:45

## 2019-12-26 RX ADMIN — CYCLOBENZAPRINE HYDROCHLORIDE 5 MG: 10 TABLET, FILM COATED ORAL at 22:28

## 2019-12-26 RX ADMIN — OXYBUTYNIN CHLORIDE 5 MG: 5 TABLET ORAL at 22:28

## 2019-12-26 RX ADMIN — MELATONIN TAB 5 MG 5 MG: 5 TAB at 22:27

## 2019-12-26 RX ADMIN — MIRTAZAPINE 15 MG: 15 TABLET, FILM COATED ORAL at 22:27

## 2019-12-26 NOTE — TELEPHONE ENCOUNTER
DAVID CALLED BACK I SPOKE WITH DR CHOUDHARY AND HE SUGGESTED HIM TO GO TO [facility] ADVISED

## 2019-12-26 NOTE — TELEPHONE ENCOUNTER
PT SICK WITH  NAUSEA HAS DIARRHEA NOTHING IS ORDERED PRN     IF DR. CHOUDHARY  WILL CALL IT IN FAX PRESCRIPTION [phone]    PLEASE GIVE LEIDA FROM [facility]   A CALL

## 2019-12-26 NOTE — TELEPHONE ENCOUNTER
Printed and faxed prescription for immodium and phenergan. Shayy from [facility] ([phone])  also just called stating Rosario b/p was 90/50 and his diarrhea was tar-like, she is mostly concerned about b/p, please advise?

## 2019-12-27 ENCOUNTER — ANESTHESIA EVENT (OUTPATIENT)
Dept: GASTROENTEROLOGY | Facility: HOSPITAL | Age: 69
End: 2019-12-27

## 2019-12-27 ENCOUNTER — READMISSION MANAGEMENT (OUTPATIENT)
Dept: CALL CENTER | Facility: HOSPITAL | Age: 69
End: 2019-12-27

## 2019-12-27 ENCOUNTER — ANESTHESIA (OUTPATIENT)
Dept: GASTROENTEROLOGY | Facility: HOSPITAL | Age: 69
End: 2019-12-27

## 2019-12-27 PROBLEM — K92.1 MELENA: Status: ACTIVE | Noted: 2019-12-27

## 2019-12-27 LAB
HGB BLD-MCNC: 9.4 G/DL (ref 13–17.7)
HGB BLD-MCNC: 9.7 G/DL (ref 13–17.7)
HGB BLD-MCNC: 9.7 G/DL (ref 13–17.7)
HGB BLD-MCNC: 9.9 G/DL (ref 13–17.7)

## 2019-12-27 PROCEDURE — 94640 AIRWAY INHALATION TREATMENT: CPT

## 2019-12-27 PROCEDURE — 94799 UNLISTED PULMONARY SVC/PX: CPT

## 2019-12-27 PROCEDURE — 25010000002 PROPOFOL 1000 MG/100ML EMULSION: Performed by: NURSE ANESTHETIST, CERTIFIED REGISTERED

## 2019-12-27 PROCEDURE — 85018 HEMOGLOBIN: CPT | Performed by: SURGERY

## 2019-12-27 PROCEDURE — 25010000002 FENTANYL CITRATE (PF) 100 MCG/2ML SOLUTION: Performed by: NURSE ANESTHETIST, CERTIFIED REGISTERED

## 2019-12-27 PROCEDURE — 85018 HEMOGLOBIN: CPT | Performed by: INTERNAL MEDICINE

## 2019-12-27 PROCEDURE — 99222 1ST HOSP IP/OBS MODERATE 55: CPT | Performed by: SURGERY

## 2019-12-27 PROCEDURE — 0DJ08ZZ INSPECTION OF UPPER INTESTINAL TRACT, VIA NATURAL OR ARTIFICIAL OPENING ENDOSCOPIC: ICD-10-PCS | Performed by: SURGERY

## 2019-12-27 PROCEDURE — 25010000002 MIDAZOLAM PER 1MG: Performed by: NURSE ANESTHETIST, CERTIFIED REGISTERED

## 2019-12-27 PROCEDURE — 99232 SBSQ HOSP IP/OBS MODERATE 35: CPT | Performed by: FAMILY MEDICINE

## 2019-12-27 RX ORDER — FENTANYL CITRATE 50 UG/ML
INJECTION, SOLUTION INTRAMUSCULAR; INTRAVENOUS AS NEEDED
Status: DISCONTINUED | OUTPATIENT
Start: 2019-12-27 | End: 2019-12-27 | Stop reason: SURG

## 2019-12-27 RX ORDER — MIDAZOLAM HYDROCHLORIDE 2 MG/2ML
INJECTION, SOLUTION INTRAMUSCULAR; INTRAVENOUS AS NEEDED
Status: DISCONTINUED | OUTPATIENT
Start: 2019-12-27 | End: 2019-12-27 | Stop reason: SURG

## 2019-12-27 RX ORDER — LIDOCAINE HYDROCHLORIDE 20 MG/ML
INJECTION, SOLUTION INTRAVENOUS AS NEEDED
Status: DISCONTINUED | OUTPATIENT
Start: 2019-12-27 | End: 2019-12-27 | Stop reason: SURG

## 2019-12-27 RX ORDER — PROPOFOL 10 MG/ML
INJECTION, EMULSION INTRAVENOUS AS NEEDED
Status: DISCONTINUED | OUTPATIENT
Start: 2019-12-27 | End: 2019-12-27 | Stop reason: SURG

## 2019-12-27 RX ORDER — IPRATROPIUM BROMIDE AND ALBUTEROL SULFATE 2.5; .5 MG/3ML; MG/3ML
3 SOLUTION RESPIRATORY (INHALATION) ONCE
Status: COMPLETED | OUTPATIENT
Start: 2019-12-27 | End: 2019-12-27

## 2019-12-27 RX ADMIN — PROPOFOL 50 MG: 10 INJECTION, EMULSION INTRAVENOUS at 12:43

## 2019-12-27 RX ADMIN — MIRTAZAPINE 15 MG: 15 TABLET, FILM COATED ORAL at 21:04

## 2019-12-27 RX ADMIN — BUDESONIDE AND FORMOTEROL FUMARATE DIHYDRATE 2 PUFF: 80; 4.5 AEROSOL RESPIRATORY (INHALATION) at 07:33

## 2019-12-27 RX ADMIN — BUDESONIDE AND FORMOTEROL FUMARATE DIHYDRATE 2 PUFF: 80; 4.5 AEROSOL RESPIRATORY (INHALATION) at 00:18

## 2019-12-27 RX ADMIN — CYCLOBENZAPRINE HYDROCHLORIDE 5 MG: 10 TABLET, FILM COATED ORAL at 21:04

## 2019-12-27 RX ADMIN — IPRATROPIUM BROMIDE AND ALBUTEROL SULFATE 3 ML: .5; 3 SOLUTION RESPIRATORY (INHALATION) at 12:26

## 2019-12-27 RX ADMIN — POLYETHYLENE GLYCOL 3350 238 G: 17 POWDER, FOR SOLUTION ORAL at 16:16

## 2019-12-27 RX ADMIN — TAMSULOSIN HYDROCHLORIDE 0.4 MG: 0.4 CAPSULE ORAL at 21:03

## 2019-12-27 RX ADMIN — MIDAZOLAM HYDROCHLORIDE 2 MG: 1 INJECTION, SOLUTION INTRAMUSCULAR; INTRAVENOUS at 12:43

## 2019-12-27 RX ADMIN — POLYSACCHARIDE-IRON COMPLEX 150 MG: 150 CAPSULE ORAL at 08:58

## 2019-12-27 RX ADMIN — MELATONIN TAB 5 MG 5 MG: 5 TAB at 21:04

## 2019-12-27 RX ADMIN — IPRATROPIUM BROMIDE AND ALBUTEROL SULFATE 3 ML: .5; 3 SOLUTION RESPIRATORY (INHALATION) at 00:18

## 2019-12-27 RX ADMIN — IPRATROPIUM BROMIDE AND ALBUTEROL SULFATE 3 ML: .5; 3 SOLUTION RESPIRATORY (INHALATION) at 07:33

## 2019-12-27 RX ADMIN — SODIUM CHLORIDE, PRESERVATIVE FREE 10 ML: 5 INJECTION INTRAVENOUS at 21:04

## 2019-12-27 RX ADMIN — METOPROLOL TARTRATE 25 MG: 25 TABLET ORAL at 21:03

## 2019-12-27 RX ADMIN — ATORVASTATIN CALCIUM 10 MG: 10 TABLET, FILM COATED ORAL at 21:03

## 2019-12-27 RX ADMIN — BUDESONIDE AND FORMOTEROL FUMARATE DIHYDRATE 2 PUFF: 80; 4.5 AEROSOL RESPIRATORY (INHALATION) at 19:16

## 2019-12-27 RX ADMIN — LIDOCAINE HYDROCHLORIDE 100 MG: 20 INJECTION, SOLUTION INTRAVENOUS at 12:43

## 2019-12-27 RX ADMIN — OXYBUTYNIN CHLORIDE 5 MG: 5 TABLET ORAL at 21:03

## 2019-12-27 RX ADMIN — IPRATROPIUM BROMIDE AND ALBUTEROL SULFATE 3 ML: .5; 3 SOLUTION RESPIRATORY (INHALATION) at 19:16

## 2019-12-27 RX ADMIN — PANTOPRAZOLE SODIUM 40 MG: 40 INJECTION, POWDER, FOR SOLUTION INTRAVENOUS at 21:05

## 2019-12-27 RX ADMIN — FENTANYL CITRATE 100 MCG: 50 INJECTION, SOLUTION INTRAMUSCULAR; INTRAVENOUS at 12:43

## 2019-12-27 RX ADMIN — PANTOPRAZOLE SODIUM 40 MG: 40 INJECTION, POWDER, FOR SOLUTION INTRAVENOUS at 08:59

## 2019-12-27 RX ADMIN — SODIUM CHLORIDE, PRESERVATIVE FREE 10 ML: 5 INJECTION INTRAVENOUS at 08:58

## 2019-12-27 RX ADMIN — ESCITALOPRAM OXALATE 20 MG: 20 TABLET ORAL at 08:58

## 2019-12-27 RX ADMIN — METOPROLOL TARTRATE 25 MG: 25 TABLET ORAL at 08:58

## 2019-12-27 NOTE — ANESTHESIA POSTPROCEDURE EVALUATION
Patient: Jani Pena    Procedure Summary     Date:  12/27/19 Room / Location:  [facility] ENDOSCOPY 2 / [facility] ENDOSCOPY    Anesthesia Start:  1241 Anesthesia Stop:  1303    Procedure:  ESOPHAGOGASTRODUODENOSCOPY (N/A Esophagus) Diagnosis:      Surgeon:  Valdemar Mcdonald MD Provider:      Anesthesia Type:  MAC ASA Status:  4          Anesthesia Type: MAC    Vitals  Vitals Value Taken Time   /45 12/27/2019  2:05 PM   Temp 97.2 °F (36.2 °C) 12/27/2019  2:05 PM   Pulse 73 12/27/2019  2:05 PM   Resp 12 12/27/2019  2:05 PM   SpO2 98 % 12/27/2019  2:05 PM           Post Anesthesia Care and Evaluation    Patient location during evaluation: PACU  Patient participation: complete - patient participated  Level of consciousness: awake  Pain score: 0  Pain management: adequate  Airway patency: patent  Anesthetic complications: No anesthetic complications  PONV Status: controlled  Cardiovascular status: acceptable and stable  Respiratory status: acceptable and room air  Hydration status: acceptable

## 2019-12-27 NOTE — OUTREACH NOTE
COPD/PN Week 2 Survey      Responses   Facility patient discharged from?  [facility]   Does the patient have one of the following disease processes/diagnoses(primary or secondary)?  COPD/Pneumonia   Week 2 attempt successful?  No   Revoke  Readmitted          Mercy Collins RN

## 2019-12-27 NOTE — ANESTHESIA PREPROCEDURE EVALUATION
Anesthesia Evaluation     Patient summary reviewed and Nursing notes reviewed   no history of anesthetic complications:  NPO Solid Status: > 8 hours  NPO Liquid Status: > 8 hours           Airway   Mallampati: I  TM distance: >3 FB  Neck ROM: full  No difficulty expected  Dental - normal exam   (+) partials, poor dentition and upper dentures    Pulmonary    (+) a smoker (2017), COPD (MDI ) moderate, shortness of breath, sleep apnea on CPAP, decreased breath sounds,   Cardiovascular - normal exam    ECG reviewed  PT is on anticoagulation therapy  Patient on routine beta blocker  Rhythm: regular  Rate: normal    (+) hypertension, CAD, CABG, dysrhythmias, angina with exertion, CHF , AQUINO, hyperlipidemia,     ROS comment: EKG EF45%  Borderline LVH   Moderate LAE  normal LVedp  Aortic sclerosis with mild AI   Mild MR Mild TR with PAsp of 48 mm of hg    Mild dilation of the RV with normal RV Function   Severe HK of the inferior and septal walls   CATH 30% occlusive disease    Neuro/Psych  (+) TIA (2014), syncope, psychiatric history Anxiety and Depression,     (-) CVA  GI/Hepatic/Renal/Endo    (+)  hiatal hernia, GERD,  renal disease CRI and ARF, diabetes mellitus,     Musculoskeletal     (+) back pain, neck pain,   Abdominal  - normal exam    Abdomen: soft.  Bowel sounds: normal.   Substance History      OB/GYN          Other   arthritis, blood dyscrasia anemia,     ROS/Med Hx Other: Hgb 9.7  Crt 2.54                    Anesthesia Plan    ASA 4     MAC   (Risks and benefits discussed including risk of aspiration, recall and dental damage. All patient questions answered. Will continue with POC.)  intravenous induction     Anesthetic plan, all risks, benefits, and alternatives have been provided, discussed and informed consent has been obtained with: patient.    Plan discussed with CRNA.

## 2019-12-28 ENCOUNTER — ANESTHESIA EVENT (OUTPATIENT)
Dept: GASTROENTEROLOGY | Facility: HOSPITAL | Age: 69
End: 2019-12-28

## 2019-12-28 ENCOUNTER — ANESTHESIA (OUTPATIENT)
Dept: GASTROENTEROLOGY | Facility: HOSPITAL | Age: 69
End: 2019-12-28

## 2019-12-28 VITALS
SYSTOLIC BLOOD PRESSURE: 114 MMHG | BODY MASS INDEX: 23.39 KG/M2 | DIASTOLIC BLOOD PRESSURE: 96 MMHG | HEIGHT: 68 IN | WEIGHT: 154.32 LBS | TEMPERATURE: 97.8 F | OXYGEN SATURATION: 97 % | HEART RATE: 74 BPM | RESPIRATION RATE: 20 BRPM

## 2019-12-28 LAB
ANION GAP SERPL CALCULATED.3IONS-SCNC: 10.5 MMOL/L (ref 5–15)
BASOPHILS # BLD AUTO: 0.04 10*3/MM3 (ref 0–0.2)
BASOPHILS NFR BLD AUTO: 0.5 % (ref 0–1.5)
BUN BLD-MCNC: 28 MG/DL (ref 8–23)
BUN/CREAT SERPL: 17.2 (ref 7–25)
CALCIUM SPEC-SCNC: 8.6 MG/DL (ref 8.6–10.5)
CHLORIDE SERPL-SCNC: 103 MMOL/L (ref 98–107)
CO2 SERPL-SCNC: 30.5 MMOL/L (ref 22–29)
CREAT BLD-MCNC: 1.63 MG/DL (ref 0.76–1.27)
DEPRECATED RDW RBC AUTO: 49.1 FL (ref 37–54)
EOSINOPHIL # BLD AUTO: 0.38 10*3/MM3 (ref 0–0.4)
EOSINOPHIL NFR BLD AUTO: 4.8 % (ref 0.3–6.2)
ERYTHROCYTE [DISTWIDTH] IN BLOOD BY AUTOMATED COUNT: 13.2 % (ref 12.3–15.4)
GFR SERPL CREATININE-BSD FRML MDRD: 42 ML/MIN/1.73
GLUCOSE BLD-MCNC: 94 MG/DL (ref 65–99)
HCT VFR BLD AUTO: 33.9 % (ref 37.5–51)
HGB BLD-MCNC: 10 G/DL (ref 13–17.7)
HYPOCHROMIA BLD QL: NORMAL
IMM GRANULOCYTES # BLD AUTO: 0.05 10*3/MM3 (ref 0–0.05)
IMM GRANULOCYTES NFR BLD AUTO: 0.6 % (ref 0–0.5)
LYMPHOCYTES # BLD AUTO: 1.15 10*3/MM3 (ref 0.7–3.1)
LYMPHOCYTES NFR BLD AUTO: 14.6 % (ref 19.6–45.3)
MCH RBC QN AUTO: 29.9 PG (ref 26.6–33)
MCHC RBC AUTO-ENTMCNC: 29.5 G/DL (ref 31.5–35.7)
MCV RBC AUTO: 101.5 FL (ref 79–97)
MONOCYTES # BLD AUTO: 0.81 10*3/MM3 (ref 0.1–0.9)
MONOCYTES NFR BLD AUTO: 10.3 % (ref 5–12)
NEUTROPHILS # BLD AUTO: 5.43 10*3/MM3 (ref 1.7–7)
NEUTROPHILS NFR BLD AUTO: 69.2 % (ref 42.7–76)
NRBC BLD AUTO-RTO: 0 /100 WBC (ref 0–0.2)
PLATELET # BLD AUTO: 195 10*3/MM3 (ref 140–450)
PMV BLD AUTO: 8.9 FL (ref 6–12)
POTASSIUM BLD-SCNC: 3.9 MMOL/L (ref 3.5–5.2)
RBC # BLD AUTO: 3.34 10*6/MM3 (ref 4.14–5.8)
SMALL PLATELETS BLD QL SMEAR: ADEQUATE
SODIUM BLD-SCNC: 144 MMOL/L (ref 136–145)
WBC MORPH BLD: NORMAL
WBC NRBC COR # BLD: 7.86 10*3/MM3 (ref 3.4–10.8)

## 2019-12-28 PROCEDURE — 94799 UNLISTED PULMONARY SVC/PX: CPT

## 2019-12-28 PROCEDURE — 0DBN8ZZ EXCISION OF SIGMOID COLON, VIA NATURAL OR ARTIFICIAL OPENING ENDOSCOPIC: ICD-10-PCS | Performed by: SURGERY

## 2019-12-28 PROCEDURE — 80048 BASIC METABOLIC PNL TOTAL CA: CPT | Performed by: FAMILY MEDICINE

## 2019-12-28 PROCEDURE — 99238 HOSP IP/OBS DSCHRG MGMT 30/<: CPT | Performed by: INTERNAL MEDICINE

## 2019-12-28 PROCEDURE — 85007 BL SMEAR W/DIFF WBC COUNT: CPT | Performed by: FAMILY MEDICINE

## 2019-12-28 PROCEDURE — 93005 ELECTROCARDIOGRAM TRACING: CPT | Performed by: INTERNAL MEDICINE

## 2019-12-28 PROCEDURE — 88305 TISSUE EXAM BY PATHOLOGIST: CPT | Performed by: SURGERY

## 2019-12-28 PROCEDURE — 25010000002 PROPOFOL 200 MG/20ML EMULSION: Performed by: NURSE ANESTHETIST, CERTIFIED REGISTERED

## 2019-12-28 PROCEDURE — 85025 COMPLETE CBC W/AUTO DIFF WBC: CPT | Performed by: FAMILY MEDICINE

## 2019-12-28 RX ORDER — SODIUM CHLORIDE 0.9 % (FLUSH) 0.9 %
3 SYRINGE (ML) INJECTION EVERY 12 HOURS SCHEDULED
Status: DISCONTINUED | OUTPATIENT
Start: 2019-12-28 | End: 2019-12-28 | Stop reason: HOSPADM

## 2019-12-28 RX ORDER — SODIUM CHLORIDE, SODIUM LACTATE, POTASSIUM CHLORIDE, CALCIUM CHLORIDE 600; 310; 30; 20 MG/100ML; MG/100ML; MG/100ML; MG/100ML
INJECTION, SOLUTION INTRAVENOUS CONTINUOUS PRN
Status: DISCONTINUED | OUTPATIENT
Start: 2019-12-28 | End: 2019-12-28 | Stop reason: SURG

## 2019-12-28 RX ORDER — MAGNESIUM HYDROXIDE 1200 MG/15ML
LIQUID ORAL AS NEEDED
Status: DISCONTINUED | OUTPATIENT
Start: 2019-12-28 | End: 2019-12-28 | Stop reason: HOSPADM

## 2019-12-28 RX ORDER — DEXTROSE AND SODIUM CHLORIDE 5; .45 G/100ML; G/100ML
100 INJECTION, SOLUTION INTRAVENOUS CONTINUOUS
Status: DISCONTINUED | OUTPATIENT
Start: 2019-12-28 | End: 2019-12-28 | Stop reason: HOSPADM

## 2019-12-28 RX ORDER — PROPOFOL 10 MG/ML
INJECTION, EMULSION INTRAVENOUS AS NEEDED
Status: DISCONTINUED | OUTPATIENT
Start: 2019-12-28 | End: 2019-12-28 | Stop reason: SURG

## 2019-12-28 RX ORDER — SODIUM CHLORIDE 0.9 % (FLUSH) 0.9 %
3-10 SYRINGE (ML) INJECTION AS NEEDED
Status: DISCONTINUED | OUTPATIENT
Start: 2019-12-28 | End: 2019-12-28 | Stop reason: HOSPADM

## 2019-12-28 RX ADMIN — ACETAMINOPHEN 650 MG: 325 TABLET, FILM COATED ORAL at 04:39

## 2019-12-28 RX ADMIN — PROPOFOL 20 MG: 10 INJECTION, EMULSION INTRAVENOUS at 08:50

## 2019-12-28 RX ADMIN — DEXTROSE AND SODIUM CHLORIDE 100 ML/HR: 5; 450 INJECTION, SOLUTION INTRAVENOUS at 10:25

## 2019-12-28 RX ADMIN — PROPOFOL 20 MG: 10 INJECTION, EMULSION INTRAVENOUS at 08:54

## 2019-12-28 RX ADMIN — BUDESONIDE AND FORMOTEROL FUMARATE DIHYDRATE 2 PUFF: 80; 4.5 AEROSOL RESPIRATORY (INHALATION) at 06:54

## 2019-12-28 RX ADMIN — SODIUM CHLORIDE, PRESERVATIVE FREE 10 ML: 5 INJECTION INTRAVENOUS at 10:33

## 2019-12-28 RX ADMIN — ESCITALOPRAM OXALATE 20 MG: 20 TABLET ORAL at 10:28

## 2019-12-28 RX ADMIN — METOPROLOL TARTRATE 25 MG: 25 TABLET ORAL at 07:53

## 2019-12-28 RX ADMIN — PROPOFOL 30 MG: 10 INJECTION, EMULSION INTRAVENOUS at 08:45

## 2019-12-28 RX ADMIN — SODIUM CHLORIDE, POTASSIUM CHLORIDE, SODIUM LACTATE AND CALCIUM CHLORIDE: 600; 310; 30; 20 INJECTION, SOLUTION INTRAVENOUS at 08:31

## 2019-12-28 RX ADMIN — PANTOPRAZOLE SODIUM 40 MG: 40 INJECTION, POWDER, FOR SOLUTION INTRAVENOUS at 10:28

## 2019-12-28 RX ADMIN — IPRATROPIUM BROMIDE AND ALBUTEROL SULFATE 3 ML: .5; 3 SOLUTION RESPIRATORY (INHALATION) at 00:07

## 2019-12-28 RX ADMIN — IPRATROPIUM BROMIDE AND ALBUTEROL SULFATE 3 ML: .5; 3 SOLUTION RESPIRATORY (INHALATION) at 06:54

## 2019-12-28 RX ADMIN — PROPOFOL 20 MG: 10 INJECTION, EMULSION INTRAVENOUS at 09:00

## 2019-12-28 RX ADMIN — POLYSACCHARIDE-IRON COMPLEX 150 MG: 150 CAPSULE ORAL at 10:28

## 2019-12-28 NOTE — ANESTHESIA POSTPROCEDURE EVALUATION
Patient: Jani Pena    Procedure Summary     Date:  12/28/19 Room / Location:  [facility] ENDOSCOPY 2 / [facility] ENDOSCOPY    Anesthesia Start:  0842 Anesthesia Stop:  0904    Procedure:  COLONOSCOPY WITH HOT POLYPECTOMY (N/A ) Diagnosis:       Melena      (Melena [K92.1])    Surgeon:  Celio Maldonado MD Provider:  Jurgen Mejia CRNA    Anesthesia Type:  MAC ASA Status:  4          Anesthesia Type: MAC    Vitals  No vitals data found for the desired time range.          Post Anesthesia Care and Evaluation    Patient location during evaluation: bedside  Patient participation: complete - patient participated  Level of consciousness: awake and alert  Pain score: 0  Pain management: adequate  Airway patency: patent  Anesthetic complications: No anesthetic complications  PONV Status: none  Cardiovascular status: acceptable  Respiratory status: acceptable  Hydration status: acceptable

## 2019-12-28 NOTE — ANESTHESIA PREPROCEDURE EVALUATION
Anesthesia Evaluation     Patient summary reviewed and Nursing notes reviewed   no history of anesthetic complications:  NPO Solid Status: > 8 hours  NPO Liquid Status: > 8 hours           Airway   Mallampati: I  TM distance: >3 FB  Neck ROM: full  No difficulty expected  Dental - normal exam   (+) partials, poor dentition and upper dentures    Pulmonary    (+) pneumonia resolved , a smoker (2017), COPD (MDI ) moderate, shortness of breath, sleep apnea on CPAP, decreased breath sounds,   Cardiovascular - normal exam    ECG reviewed  PT is on anticoagulation therapy  Patient on routine beta blocker  Rhythm: regular  Rate: normal    (+) hypertension, CAD, CABG, dysrhythmias, angina with exertion, CHF , AQUINO, hyperlipidemia,     ROS comment: EKG EF45%  Borderline LVH   Moderate LAE  normal LVedp  Aortic sclerosis with mild AI   Mild MR Mild TR with PAsp of 48 mm of hg    Mild dilation of the RV with normal RV Function   Severe HK of the inferior and septal walls   CATH 30% occlusive disease    Neuro/Psych  (+) TIA (2014), syncope, numbness, psychiatric history Anxiety and Depression,     (-) CVA  GI/Hepatic/Renal/Endo    (+)  hiatal hernia, GERD,  renal disease CRI and ARF, diabetes mellitus,     Musculoskeletal     (+) back pain, neck pain,   Abdominal  - normal exam    Abdomen: soft.  Bowel sounds: normal.   Substance History      OB/GYN          Other   arthritis, blood dyscrasia anemia,                       Anesthesia Plan    ASA 4     MAC   (Risks and benefits discussed including risk of aspiration, recall and dental damage. All patient questions answered. Will continue with POC.)  intravenous induction     Anesthetic plan, all risks, benefits, and alternatives have been provided, discussed and informed consent has been obtained with: patient.    Plan discussed with CRNA.

## 2019-12-29 ENCOUNTER — READMISSION MANAGEMENT (OUTPATIENT)
Dept: CALL CENTER | Facility: HOSPITAL | Age: 69
End: 2019-12-29

## 2019-12-29 NOTE — OUTREACH NOTE
Prep Survey      Responses   Facility patient discharged from?  [facility]   Is patient eligible?  Yes   Discharge diagnosis  Anxiety, s/p colonoscopy with hot polypectomy, upper GI endoscopy, GERD, HLD, BPH with incomplete bladder emptying, hx arthritis, depression, Chronic diastolic HF, ARTIS, melena   Does the patient have one of the following disease processes/diagnoses(primary or secondary)?  General Surgery   What is the Home health agency?   [facility]    Is there a DME ordered?  No   [facility]   Comments regarding appointments  Pt to schedule follow up appointment   General alerts for this patient  Resident of [facility]   Prep survey completed?  Yes          Tomasa Suh RN

## 2019-12-30 ENCOUNTER — TRANSITIONAL CARE MANAGEMENT TELEPHONE ENCOUNTER (OUTPATIENT)
Dept: INTERNAL MEDICINE | Facility: CLINIC | Age: 69
End: 2019-12-30

## 2019-12-31 NOTE — OUTREACH NOTE
"TCM call completed with pt. Pt states feeling better. Bowels \"irregular but ok\". No diarrhea or constipation. No bm since home. Doesn't have much of an appetite. Still nauseous at times but getting better. Lightheaded/dizzy at times. Wearing his usual oxygen at 3-4L. He states he has to catch his breath at times. Hospital f/u confirmed. Pt then abruptly requested to end call and call was ended. Pt didn't voice any needs or questions during call.  "

## 2020-01-01 ENCOUNTER — TELEPHONE (OUTPATIENT)
Dept: INTERNAL MEDICINE | Facility: CLINIC | Age: 70
End: 2020-01-01

## 2020-01-01 ENCOUNTER — EPISODE CHANGES (OUTPATIENT)
Dept: CASE MANAGEMENT | Facility: OTHER | Age: 70
End: 2020-01-01

## 2020-01-01 ENCOUNTER — ANESTHESIA EVENT (OUTPATIENT)
Dept: GASTROENTEROLOGY | Facility: HOSPITAL | Age: 70
End: 2020-01-01

## 2020-01-01 ENCOUNTER — APPOINTMENT (OUTPATIENT)
Dept: GENERAL RADIOLOGY | Facility: HOSPITAL | Age: 70
End: 2020-01-01

## 2020-01-01 ENCOUNTER — APPOINTMENT (OUTPATIENT)
Dept: CT IMAGING | Facility: HOSPITAL | Age: 70
End: 2020-01-01

## 2020-01-01 ENCOUNTER — HOSPITAL ENCOUNTER (OUTPATIENT)
Facility: HOSPITAL | Age: 70
Setting detail: HOSPITAL OUTPATIENT SURGERY
End: 2020-01-01
Attending: SURGERY | Admitting: SURGERY

## 2020-01-01 ENCOUNTER — HOSPITAL ENCOUNTER (EMERGENCY)
Facility: HOSPITAL | Age: 70
Discharge: HOME | End: 2020-10-25
Attending: EMERGENCY MEDICINE | Admitting: EMERGENCY MEDICINE

## 2020-01-01 ENCOUNTER — HOSPITAL ENCOUNTER (OUTPATIENT)
Facility: HOSPITAL | Age: 70
Setting detail: HOSPITAL OUTPATIENT SURGERY
Discharge: HOME | End: 2020-10-20
Attending: INTERNAL MEDICINE | Admitting: INTERNAL MEDICINE

## 2020-01-01 ENCOUNTER — OFFICE VISIT (OUTPATIENT)
Dept: PULMONOLOGY | Facility: CLINIC | Age: 70
End: 2020-01-01

## 2020-01-01 ENCOUNTER — APPOINTMENT (OUTPATIENT)
Dept: NUCLEAR MEDICINE | Facility: HOSPITAL | Age: 70
End: 2020-01-01

## 2020-01-01 ENCOUNTER — READMISSION MANAGEMENT (OUTPATIENT)
Dept: CALL CENTER | Facility: HOSPITAL | Age: 70
End: 2020-01-01

## 2020-01-01 ENCOUNTER — OFFICE VISIT (OUTPATIENT)
Dept: INTERNAL MEDICINE | Facility: CLINIC | Age: 70
End: 2020-01-01

## 2020-01-01 ENCOUNTER — TELEPHONE (OUTPATIENT)
Dept: INFUSION THERAPY | Facility: HOSPITAL | Age: 70
End: 2020-01-01

## 2020-01-01 ENCOUNTER — TELEMEDICINE (OUTPATIENT)
Dept: INTERNAL MEDICINE | Facility: CLINIC | Age: 70
End: 2020-01-01

## 2020-01-01 ENCOUNTER — HOSPITAL ENCOUNTER (EMERGENCY)
Facility: HOSPITAL | Age: 70
Discharge: HOME | End: 2020-12-22
Attending: EMERGENCY MEDICINE | Admitting: STUDENT IN AN ORGANIZED HEALTH CARE EDUCATION/TRAINING PROGRAM

## 2020-01-01 ENCOUNTER — TRANSITIONAL CARE MANAGEMENT TELEPHONE ENCOUNTER (OUTPATIENT)
Dept: CALL CENTER | Facility: HOSPITAL | Age: 70
End: 2020-01-01

## 2020-01-01 ENCOUNTER — HOSPITAL ENCOUNTER (OUTPATIENT)
Dept: CT IMAGING | Facility: HOSPITAL | Age: 70
Discharge: HOME | End: 2020-09-21
Admitting: INTERNAL MEDICINE

## 2020-01-01 ENCOUNTER — OFFICE VISIT (OUTPATIENT)
Dept: CARDIOLOGY | Facility: CLINIC | Age: 70
End: 2020-01-01

## 2020-01-01 ENCOUNTER — HOSPITAL ENCOUNTER (OUTPATIENT)
Dept: INFUSION THERAPY | Facility: HOSPITAL | Age: 70
Discharge: HOME | End: 2020-10-07
Admitting: INTERNAL MEDICINE

## 2020-01-01 ENCOUNTER — OFFICE VISIT (OUTPATIENT)
Dept: GASTROENTEROLOGY | Facility: CLINIC | Age: 70
End: 2020-01-01

## 2020-01-01 ENCOUNTER — HOSPITAL ENCOUNTER (OUTPATIENT)
Dept: INFUSION THERAPY | Facility: HOSPITAL | Age: 70
Discharge: HOME | End: 2020-09-09
Admitting: PHYSICIAN ASSISTANT

## 2020-01-01 ENCOUNTER — APPOINTMENT (OUTPATIENT)
Dept: ULTRASOUND IMAGING | Facility: HOSPITAL | Age: 70
End: 2020-01-01

## 2020-01-01 ENCOUNTER — HOSPITAL ENCOUNTER (OUTPATIENT)
Dept: INFUSION THERAPY | Facility: HOSPITAL | Age: 70
Discharge: HOME | End: 2020-08-12
Admitting: PHYSICIAN ASSISTANT

## 2020-01-01 ENCOUNTER — HOSPITAL ENCOUNTER (INPATIENT)
Facility: HOSPITAL | Age: 70
LOS: 3 days | Discharge: HOME | End: 2020-12-16
Attending: STUDENT IN AN ORGANIZED HEALTH CARE EDUCATION/TRAINING PROGRAM | Admitting: INTERNAL MEDICINE

## 2020-01-01 ENCOUNTER — ANESTHESIA (OUTPATIENT)
Dept: GASTROENTEROLOGY | Facility: HOSPITAL | Age: 70
End: 2020-01-01

## 2020-01-01 ENCOUNTER — LAB (OUTPATIENT)
Dept: LAB | Facility: HOSPITAL | Age: 70
End: 2020-01-01

## 2020-01-01 ENCOUNTER — HOSPITAL ENCOUNTER (EMERGENCY)
Facility: HOSPITAL | Age: 70
Discharge: HOME | End: 2020-09-25
Attending: EMERGENCY MEDICINE | Admitting: EMERGENCY MEDICINE

## 2020-01-01 VITALS
OXYGEN SATURATION: 90 % | DIASTOLIC BLOOD PRESSURE: 66 MMHG | SYSTOLIC BLOOD PRESSURE: 116 MMHG | HEART RATE: 70 BPM | HEIGHT: 68 IN | BODY MASS INDEX: 24.86 KG/M2 | WEIGHT: 164 LBS

## 2020-01-01 VITALS
TEMPERATURE: 98 F | WEIGHT: 166.67 LBS | DIASTOLIC BLOOD PRESSURE: 65 MMHG | BODY MASS INDEX: 25.26 KG/M2 | HEART RATE: 84 BPM | RESPIRATION RATE: 18 BRPM | SYSTOLIC BLOOD PRESSURE: 128 MMHG | OXYGEN SATURATION: 92 % | HEIGHT: 68 IN

## 2020-01-01 VITALS
OXYGEN SATURATION: 97 % | HEART RATE: 80 BPM | BODY MASS INDEX: 26.12 KG/M2 | HEIGHT: 67 IN | WEIGHT: 166.4 LBS | DIASTOLIC BLOOD PRESSURE: 56 MMHG | TEMPERATURE: 97.7 F | SYSTOLIC BLOOD PRESSURE: 122 MMHG

## 2020-01-01 VITALS
RESPIRATION RATE: 20 BRPM | DIASTOLIC BLOOD PRESSURE: 79 MMHG | TEMPERATURE: 97.3 F | WEIGHT: 178 LBS | SYSTOLIC BLOOD PRESSURE: 159 MMHG | HEIGHT: 68 IN | BODY MASS INDEX: 26.98 KG/M2 | HEART RATE: 94 BPM

## 2020-01-01 VITALS
BODY MASS INDEX: 24.86 KG/M2 | TEMPERATURE: 98.2 F | HEART RATE: 91 BPM | OXYGEN SATURATION: 94 % | DIASTOLIC BLOOD PRESSURE: 64 MMHG | SYSTOLIC BLOOD PRESSURE: 116 MMHG | WEIGHT: 164 LBS | HEIGHT: 68 IN

## 2020-01-01 VITALS
HEIGHT: 68 IN | BODY MASS INDEX: 25.31 KG/M2 | TEMPERATURE: 98.2 F | DIASTOLIC BLOOD PRESSURE: 74 MMHG | WEIGHT: 167 LBS | HEART RATE: 82 BPM | RESPIRATION RATE: 22 BRPM | OXYGEN SATURATION: 96 % | SYSTOLIC BLOOD PRESSURE: 111 MMHG

## 2020-01-01 VITALS
SYSTOLIC BLOOD PRESSURE: 140 MMHG | HEIGHT: 68 IN | BODY MASS INDEX: 26.22 KG/M2 | RESPIRATION RATE: 18 BRPM | OXYGEN SATURATION: 92 % | TEMPERATURE: 96.9 F | DIASTOLIC BLOOD PRESSURE: 65 MMHG | SYSTOLIC BLOOD PRESSURE: 124 MMHG | RESPIRATION RATE: 20 BRPM | DIASTOLIC BLOOD PRESSURE: 68 MMHG | HEART RATE: 84 BPM | TEMPERATURE: 98.2 F | WEIGHT: 173 LBS | OXYGEN SATURATION: 99 % | HEART RATE: 85 BPM

## 2020-01-01 VITALS
TEMPERATURE: 97.3 F | SYSTOLIC BLOOD PRESSURE: 118 MMHG | WEIGHT: 168.5 LBS | BODY MASS INDEX: 25.54 KG/M2 | RESPIRATION RATE: 18 BRPM | DIASTOLIC BLOOD PRESSURE: 60 MMHG | OXYGEN SATURATION: 93 % | HEART RATE: 90 BPM | HEIGHT: 68 IN

## 2020-01-01 VITALS
HEIGHT: 67 IN | DIASTOLIC BLOOD PRESSURE: 59 MMHG | RESPIRATION RATE: 18 BRPM | BODY MASS INDEX: 26.37 KG/M2 | SYSTOLIC BLOOD PRESSURE: 114 MMHG | TEMPERATURE: 97.3 F | HEART RATE: 80 BPM | WEIGHT: 168 LBS

## 2020-01-01 VITALS
BODY MASS INDEX: 25.46 KG/M2 | HEIGHT: 68 IN | TEMPERATURE: 99.1 F | RESPIRATION RATE: 19 BRPM | DIASTOLIC BLOOD PRESSURE: 77 MMHG | OXYGEN SATURATION: 96 % | HEART RATE: 77 BPM | WEIGHT: 168 LBS | SYSTOLIC BLOOD PRESSURE: 150 MMHG

## 2020-01-01 VITALS
HEART RATE: 91 BPM | BODY MASS INDEX: 25.55 KG/M2 | OXYGEN SATURATION: 97 % | DIASTOLIC BLOOD PRESSURE: 70 MMHG | TEMPERATURE: 97.7 F | HEIGHT: 68 IN | SYSTOLIC BLOOD PRESSURE: 122 MMHG

## 2020-01-01 VITALS
OXYGEN SATURATION: 93 % | RESPIRATION RATE: 17 BRPM | DIASTOLIC BLOOD PRESSURE: 68 MMHG | HEIGHT: 68 IN | WEIGHT: 167 LBS | TEMPERATURE: 98 F | SYSTOLIC BLOOD PRESSURE: 139 MMHG | HEART RATE: 100 BPM | BODY MASS INDEX: 25.31 KG/M2

## 2020-01-01 VITALS
TEMPERATURE: 99 F | HEART RATE: 84 BPM | OXYGEN SATURATION: 99 % | SYSTOLIC BLOOD PRESSURE: 132 MMHG | WEIGHT: 167 LBS | RESPIRATION RATE: 22 BRPM | DIASTOLIC BLOOD PRESSURE: 71 MMHG | BODY MASS INDEX: 25.31 KG/M2 | HEIGHT: 68 IN

## 2020-01-01 VITALS
OXYGEN SATURATION: 94 % | SYSTOLIC BLOOD PRESSURE: 133 MMHG | HEIGHT: 67 IN | DIASTOLIC BLOOD PRESSURE: 54 MMHG | WEIGHT: 168 LBS | SYSTOLIC BLOOD PRESSURE: 110 MMHG | TEMPERATURE: 98.4 F | TEMPERATURE: 97.5 F | DIASTOLIC BLOOD PRESSURE: 64 MMHG | RESPIRATION RATE: 18 BRPM | OXYGEN SATURATION: 98 % | BODY MASS INDEX: 26.37 KG/M2 | BODY MASS INDEX: 25.46 KG/M2 | HEART RATE: 93 BPM | HEIGHT: 68 IN | WEIGHT: 168 LBS | RESPIRATION RATE: 18 BRPM | HEART RATE: 84 BPM

## 2020-01-01 VITALS
HEART RATE: 68 BPM | WEIGHT: 169 LBS | BODY MASS INDEX: 25.61 KG/M2 | OXYGEN SATURATION: 97 % | DIASTOLIC BLOOD PRESSURE: 80 MMHG | HEIGHT: 68 IN | SYSTOLIC BLOOD PRESSURE: 140 MMHG | TEMPERATURE: 97 F

## 2020-01-01 DIAGNOSIS — E78.2 MIXED HYPERLIPIDEMIA: ICD-10-CM

## 2020-01-01 DIAGNOSIS — R09.02 HYPOXIA: ICD-10-CM

## 2020-01-01 DIAGNOSIS — J44.9 STEROID-DEPENDENT COPD (HCC): ICD-10-CM

## 2020-01-01 DIAGNOSIS — R06.02 SHORTNESS OF BREATH: ICD-10-CM
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
## 2020-01-01 DIAGNOSIS — Z01.818 PREOP TESTING: ICD-10-CM

## 2020-01-01 DIAGNOSIS — R13.12 OROPHARYNGEAL DYSPHAGIA: ICD-10-CM

## 2020-01-01 DIAGNOSIS — J96.11 CHRONIC RESPIRATORY FAILURE WITH HYPOXIA AND HYPERCAPNIA (HCC): ICD-10-CM

## 2020-01-01 DIAGNOSIS — I44.1 AV BLOCK, MOBITZ II: ICD-10-CM

## 2020-01-01 DIAGNOSIS — K21.9 GERD WITHOUT ESOPHAGITIS: ICD-10-CM

## 2020-01-01 DIAGNOSIS — J96.11 CHRONIC RESPIRATORY FAILURE WITH HYPOXIA AND HYPERCAPNIA (HCC): Primary | ICD-10-CM

## 2020-01-01 DIAGNOSIS — F41.9 ANXIETY: ICD-10-CM

## 2020-01-01 DIAGNOSIS — S30.0XXA TRAUMATIC HEMATOMA OF LOWER BACK, INITIAL ENCOUNTER: ICD-10-CM

## 2020-01-01 DIAGNOSIS — Z01.812 PRE-PROCEDURE LAB EXAM: Primary | ICD-10-CM
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
## 2020-01-01 DIAGNOSIS — I10 ESSENTIAL HYPERTENSION: ICD-10-CM

## 2020-01-01 DIAGNOSIS — N18.9 CHRONIC KIDNEY DISEASE, UNSPECIFIED CKD STAGE: ICD-10-CM

## 2020-01-01 DIAGNOSIS — R13.10 DYSPHAGIA, UNSPECIFIED TYPE: ICD-10-CM

## 2020-01-01 DIAGNOSIS — R41.0 EPISODIC CONFUSION: Primary | ICD-10-CM

## 2020-01-01 DIAGNOSIS — I25.119 CORONARY ARTERY DISEASE INVOLVING NATIVE CORONARY ARTERY OF NATIVE HEART WITH ANGINA PECTORIS (HCC): ICD-10-CM

## 2020-01-01 DIAGNOSIS — N28.9 ACUTE KIDNEY INSUFFICIENCY: ICD-10-CM

## 2020-01-01 DIAGNOSIS — R06.02 SHORTNESS OF BREATH: Primary | ICD-10-CM

## 2020-01-01 DIAGNOSIS — K59.00 CONSTIPATION, UNSPECIFIED CONSTIPATION TYPE: Primary | ICD-10-CM

## 2020-01-01 DIAGNOSIS — D64.9 ANEMIA, UNSPECIFIED TYPE: Primary | ICD-10-CM

## 2020-01-01 DIAGNOSIS — R13.19 ESOPHAGEAL DYSPHAGIA: ICD-10-CM

## 2020-01-01 DIAGNOSIS — J44.1 COPD WITH EXACERBATION (HCC): ICD-10-CM

## 2020-01-01 DIAGNOSIS — J44.9 CHRONIC OBSTRUCTIVE PULMONARY DISEASE, UNSPECIFIED COPD TYPE (HCC): ICD-10-CM

## 2020-01-01 DIAGNOSIS — K22.89 PRESBYESOPHAGUS: Chronic | ICD-10-CM

## 2020-01-01 DIAGNOSIS — D64.9 ANEMIA, UNSPECIFIED TYPE: ICD-10-CM

## 2020-01-01 DIAGNOSIS — R60.9 EDEMA, UNSPECIFIED TYPE: Primary | ICD-10-CM

## 2020-01-01 DIAGNOSIS — T14.8XXA HEMATOMA: Primary | ICD-10-CM

## 2020-01-01 DIAGNOSIS — Z99.89 OSA ON CPAP: ICD-10-CM

## 2020-01-01 DIAGNOSIS — A41.9 SEPSIS SECONDARY TO UTI (HCC): Primary | ICD-10-CM

## 2020-01-01 DIAGNOSIS — R82.90 ABNORMAL URINALYSIS: ICD-10-CM

## 2020-01-01 DIAGNOSIS — G47.33 OSA ON CPAP: ICD-10-CM

## 2020-01-01 DIAGNOSIS — K22.2 ESOPHAGEAL STRICTURE: ICD-10-CM

## 2020-01-01 DIAGNOSIS — D64.9 ANEMIA, UNSPECIFIED TYPE: Chronic | ICD-10-CM

## 2020-01-01 DIAGNOSIS — I50.32 CHRONIC DIASTOLIC CONGESTIVE HEART FAILURE (HCC): Primary | ICD-10-CM

## 2020-01-01 DIAGNOSIS — R06.09 DOE (DYSPNEA ON EXERTION): ICD-10-CM

## 2020-01-01 DIAGNOSIS — F32.A DEPRESSION, UNSPECIFIED DEPRESSION TYPE: ICD-10-CM

## 2020-01-01 DIAGNOSIS — J44.1 ACUTE EXACERBATION OF CHRONIC OBSTRUCTIVE PULMONARY DISEASE (COPD) (HCC): Primary | ICD-10-CM

## 2020-01-01 DIAGNOSIS — J96.12 CHRONIC RESPIRATORY FAILURE WITH HYPOXIA AND HYPERCAPNIA (HCC): ICD-10-CM

## 2020-01-01 DIAGNOSIS — K21.9 GASTROESOPHAGEAL REFLUX DISEASE WITHOUT ESOPHAGITIS: ICD-10-CM

## 2020-01-01 DIAGNOSIS — K21.9 GASTROESOPHAGEAL REFLUX DISEASE, ESOPHAGITIS PRESENCE NOT SPECIFIED: Chronic | ICD-10-CM

## 2020-01-01 DIAGNOSIS — Z01.818 PREOP TESTING: Primary | ICD-10-CM

## 2020-01-01 DIAGNOSIS — R13.19 ESOPHAGEAL DYSPHAGIA: Primary | Chronic | ICD-10-CM

## 2020-01-01 DIAGNOSIS — R35.0 FREQUENT URINATION: Primary | ICD-10-CM

## 2020-01-01 DIAGNOSIS — M54.16 LUMBAR RADICULOPATHY: ICD-10-CM

## 2020-01-01 DIAGNOSIS — N39.0 SEPSIS SECONDARY TO UTI (HCC): Primary | ICD-10-CM

## 2020-01-01 DIAGNOSIS — K21.9 GASTROESOPHAGEAL REFLUX DISEASE WITHOUT ESOPHAGITIS: Chronic | ICD-10-CM

## 2020-01-01 DIAGNOSIS — J96.12 CHRONIC RESPIRATORY FAILURE WITH HYPOXIA AND HYPERCAPNIA (HCC): Primary | ICD-10-CM

## 2020-01-01 DIAGNOSIS — I48.0 PAROXYSMAL ATRIAL FIBRILLATION (HCC): ICD-10-CM

## 2020-01-01 DIAGNOSIS — I25.10 CORONARY ARTERY DISEASE INVOLVING NATIVE CORONARY ARTERY OF NATIVE HEART WITHOUT ANGINA PECTORIS: Primary | ICD-10-CM

## 2020-01-01 DIAGNOSIS — Z23 NEED FOR INFLUENZA VACCINATION: Primary | ICD-10-CM

## 2020-01-01 DIAGNOSIS — Z87.891 PERSONAL HISTORY OF TOBACCO USE, PRESENTING HAZARDS TO HEALTH: ICD-10-CM

## 2020-01-01 LAB
A-A DO2: 4 MMHG
A-A DO2: ABNORMAL
ALBUMIN SERPL-MCNC: 3.7 G/DL (ref 3.5–5.2)
ALBUMIN SERPL-MCNC: 3.9 G/DL (ref 3.5–5.2)
ALBUMIN SERPL-MCNC: 4.4 G/DL (ref 3.5–5.2)
ALBUMIN SERPL-MCNC: 4.5 G/DL (ref 3.5–5.2)
ALBUMIN SERPL-MCNC: 4.5 G/DL (ref 3.8–4.8)
ALBUMIN SERPL-MCNC: 4.6 G/DL (ref 3.5–5.2)
ALBUMIN SERPL-MCNC: 4.7 G/DL (ref 3.5–5.2)
ALBUMIN SERPL-MCNC: 4.8 G/DL (ref 3.5–5.2)
ALBUMIN SERPL-MCNC: 4.9 G/DL (ref 3.5–5.2)
ALBUMIN/GLOB SERPL: 1.4 G/DL
ALBUMIN/GLOB SERPL: 1.5 G/DL
ALBUMIN/GLOB SERPL: 1.6 G/DL
ALBUMIN/GLOB SERPL: 1.7 G/DL
ALBUMIN/GLOB SERPL: 2 G/DL
ALBUMIN/GLOB SERPL: 2.1 G/DL
ALBUMIN/GLOB SERPL: 2.1 {RATIO} (ref 1.2–2.2)
ALBUMIN/GLOB SERPL: 2.7 G/DL
ALBUMIN/GLOB SERPL: 2.8 G/DL
ALP SERPL-CCNC: 51 U/L (ref 39–117)
ALP SERPL-CCNC: 58 U/L (ref 39–117)
ALP SERPL-CCNC: 58 U/L (ref 39–117)
ALP SERPL-CCNC: 60 U/L (ref 39–117)
ALP SERPL-CCNC: 62 IU/L (ref 39–117)
ALP SERPL-CCNC: 62 U/L (ref 39–117)
ALP SERPL-CCNC: 63 U/L (ref 39–117)
ALP SERPL-CCNC: 70 U/L (ref 39–117)
ALP SERPL-CCNC: 80 U/L (ref 39–117)
ALT SERPL W P-5'-P-CCNC: 10 U/L (ref 1–41)
ALT SERPL W P-5'-P-CCNC: 15 U/L (ref 1–41)
ALT SERPL W P-5'-P-CCNC: 6 U/L (ref 1–41)
ALT SERPL W P-5'-P-CCNC: 7 U/L (ref 1–41)
ALT SERPL W P-5'-P-CCNC: 8 U/L (ref 1–41)
ALT SERPL W P-5'-P-CCNC: 9 U/L (ref 1–41)
ALT SERPL-CCNC: 11 U/L (ref 1–41)
ALT SERPL-CCNC: 13 U/L (ref 1–41)
ALT SERPL-CCNC: 19 IU/L (ref 0–44)
ANION GAP SERPL CALCULATED.3IONS-SCNC: 10.6 MMOL/L (ref 5–15)
ANION GAP SERPL CALCULATED.3IONS-SCNC: 10.9 MMOL/L (ref 5–15)
ANION GAP SERPL CALCULATED.3IONS-SCNC: 11.9 MMOL/L (ref 5–15)
ANION GAP SERPL CALCULATED.3IONS-SCNC: 7.4 MMOL/L (ref 5–15)
ANION GAP SERPL CALCULATED.3IONS-SCNC: 7.5 MMOL/L (ref 5–15)
ANION GAP SERPL CALCULATED.3IONS-SCNC: 8.1 MMOL/L (ref 5–15)
ANION GAP SERPL CALCULATED.3IONS-SCNC: 8.5 MMOL/L (ref 5–15)
ANION GAP SERPL CALCULATED.3IONS-SCNC: 9.2 MMOL/L (ref 5–15)
APPEARANCE UR: CLEAR
APPEARANCE UR: CLEAR
ARTERIAL PATENCY WRIST A: ABNORMAL
ARTERIAL PATENCY WRIST A: POSITIVE
AST SERPL-CCNC: 13 U/L (ref 1–40)
AST SERPL-CCNC: 13 U/L (ref 1–40)
AST SERPL-CCNC: 14 U/L (ref 1–40)
AST SERPL-CCNC: 14 U/L (ref 1–40)
AST SERPL-CCNC: 15 U/L (ref 1–40)
AST SERPL-CCNC: 17 IU/L (ref 0–40)
AST SERPL-CCNC: 26 U/L (ref 1–40)
ATMOSPHERIC PRESS: 733 MMHG
ATMOSPHERIC PRESS: 740 MMHG
BACTERIA #/AREA URNS HPF: ABNORMAL /HPF
BACTERIA #/AREA URNS HPF: ABNORMAL /[HPF]
BACTERIA SPEC AEROBE CULT: ABNORMAL
BACTERIA SPEC AEROBE CULT: ABNORMAL
BACTERIA SPEC AEROBE CULT: NORMAL
BACTERIA SPEC AEROBE CULT: NORMAL
BACTERIA UR CULT: ABNORMAL
BACTERIA UR CULT: ABNORMAL
BACTERIA UR QL AUTO: ABNORMAL /HPF
BACTERIA UR QL AUTO: ABNORMAL /HPF
BASE EXCESS BLDA CALC-SCNC: 5.3 MMOL/L (ref 0–2)
BASE EXCESS BLDA CALC-SCNC: 5.6 MMOL/L (ref 0–2)
BASOPHILS # BLD AUTO: 0 X10E3/UL (ref 0–0.2)
BASOPHILS # BLD AUTO: 0.01 10*3/MM3 (ref 0–0.2)
BASOPHILS # BLD AUTO: 0.03 10*3/MM3 (ref 0–0.2)
BASOPHILS # BLD AUTO: 0.04 10*3/MM3 (ref 0–0.2)
BASOPHILS # BLD AUTO: 0.05 10*3/MM3 (ref 0–0.2)
BASOPHILS # BLD AUTO: 0.05 10*3/MM3 (ref 0–0.2)
BASOPHILS NFR BLD AUTO: 0 %
BASOPHILS NFR BLD AUTO: 0.1 % (ref 0–1.5)
BASOPHILS NFR BLD AUTO: 0.3 % (ref 0–1.5)
BASOPHILS NFR BLD AUTO: 0.4 % (ref 0–1.5)
BASOPHILS NFR BLD AUTO: 0.5 % (ref 0–1.5)
BASOPHILS NFR BLD AUTO: 0.6 % (ref 0–1.5)
BDY SITE: ABNORMAL
BDY SITE: ABNORMAL
BILIRUB BLD-MCNC: NEGATIVE MG/DL
BILIRUB BLD-MCNC: NEGATIVE MG/DL
BILIRUB SERPL-MCNC: 0.4 MG/DL (ref 0–1.2)
BILIRUB SERPL-MCNC: 0.5 MG/DL (ref 0–1.2)
BILIRUB SERPL-MCNC: 0.6 MG/DL (ref 0–1.2)
BILIRUB SERPL-MCNC: 0.8 MG/DL (ref 0–1.2)
BILIRUB SERPL-MCNC: 1 MG/DL (ref 0–1.2)
BILIRUB UR QL STRIP: NEGATIVE
BUN SERPL-MCNC: 37 MG/DL (ref 8–27)
BUN SERPL-MCNC: 39 MG/DL (ref 8–23)
BUN SERPL-MCNC: 39 MG/DL (ref 8–23)
BUN SERPL-MCNC: 44 MG/DL (ref 8–23)
BUN SERPL-MCNC: 45 MG/DL (ref 8–23)
BUN SERPL-MCNC: 49 MG/DL (ref 8–23)
BUN SERPL-MCNC: 50 MG/DL (ref 8–23)
BUN SERPL-MCNC: 54 MG/DL (ref 8–23)
BUN SERPL-MCNC: 54 MG/DL (ref 8–23)
BUN SERPL-MCNC: 55 MG/DL (ref 8–23)
BUN SERPL-MCNC: 56 MG/DL (ref 8–23)
BUN SERPL-MCNC: 60 MG/DL (ref 8–23)
BUN/CREAT SERPL: 17 (ref 10–24)
BUN/CREAT SERPL: 19.5 (ref 7–25)
BUN/CREAT SERPL: 21.8 (ref 7–25)
BUN/CREAT SERPL: 23.1 (ref 7–25)
BUN/CREAT SERPL: 23.2 (ref 7–25)
BUN/CREAT SERPL: 23.5 (ref 7–25)
BUN/CREAT SERPL: 24.1 (ref 7–25)
BUN/CREAT SERPL: 26 (ref 7–25)
BUN/CREAT SERPL: 26.7 (ref 7–25)
BUN/CREAT SERPL: 29.4 (ref 7–25)
BUN/CREAT SERPL: 30.2 (ref 7–25)
BUN/CREAT SERPL: 32 (ref 7–25)
CALCIUM SERPL-MCNC: 9.3 MG/DL (ref 8.6–10.5)
CALCIUM SERPL-MCNC: 9.3 MG/DL (ref 8.6–10.5)
CALCIUM SERPL-MCNC: 9.5 MG/DL (ref 8.6–10.2)
CALCIUM SERPL-MCNC: 9.5 MG/DL (ref 8.6–10.5)
CALCIUM SPEC-SCNC: 9.1 MG/DL (ref 8.6–10.5)
CALCIUM SPEC-SCNC: 9.2 MG/DL (ref 8.6–10.5)
CALCIUM SPEC-SCNC: 9.3 MG/DL (ref 8.6–10.5)
CALCIUM SPEC-SCNC: 9.4 MG/DL (ref 8.6–10.5)
CALCIUM SPEC-SCNC: 9.6 MG/DL (ref 8.6–10.5)
CALCIUM SPEC-SCNC: 9.7 MG/DL (ref 8.6–10.5)
CASTS URNS MICRO: ABNORMAL
CASTS URNS MICRO: ABNORMAL
CASTS URNS QL MICRO: PRESENT /LPF
CHLORIDE SERPL-SCNC: 100 MMOL/L (ref 98–107)
CHLORIDE SERPL-SCNC: 100 MMOL/L (ref 98–107)
CHLORIDE SERPL-SCNC: 101 MMOL/L (ref 98–107)
CHLORIDE SERPL-SCNC: 101 MMOL/L (ref 98–107)
CHLORIDE SERPL-SCNC: 104 MMOL/L (ref 98–107)
CHLORIDE SERPL-SCNC: 96 MMOL/L (ref 96–106)
CHLORIDE SERPL-SCNC: 96 MMOL/L (ref 98–107)
CHLORIDE SERPL-SCNC: 96 MMOL/L (ref 98–107)
CHLORIDE SERPL-SCNC: 98 MMOL/L (ref 98–107)
CHLORIDE SERPL-SCNC: 98 MMOL/L (ref 98–107)
CHLORIDE SERPL-SCNC: 99 MMOL/L (ref 98–107)
CHLORIDE SERPL-SCNC: 99 MMOL/L (ref 98–107)
CLARITY UR: ABNORMAL
CLARITY UR: CLEAR
CLARITY, POC: CLEAR
CLARITY, POC: CLEAR
CO2 SERPL-SCNC: 28.4 MMOL/L (ref 22–29)
CO2 SERPL-SCNC: 29.1 MMOL/L (ref 22–29)
CO2 SERPL-SCNC: 29.9 MMOL/L (ref 22–29)
CO2 SERPL-SCNC: 30.1 MMOL/L (ref 22–29)
CO2 SERPL-SCNC: 30.6 MMOL/L (ref 22–29)
CO2 SERPL-SCNC: 31 MMOL/L (ref 20–29)
CO2 SERPL-SCNC: 31.5 MMOL/L (ref 22–29)
CO2 SERPL-SCNC: 33 MMOL/L (ref 22–29)
CO2 SERPL-SCNC: 33.5 MMOL/L (ref 22–29)
CO2 SERPL-SCNC: 34.5 MMOL/L (ref 22–29)
CO2 SERPL-SCNC: 34.6 MMOL/L (ref 22–29)
CO2 SERPL-SCNC: 35.8 MMOL/L (ref 22–29)
COHGB MFR BLD: 0.6 % (ref 0–2)
COHGB MFR BLD: <0.6 % (ref 0–2)
COLOR UR: YELLOW
CREAT SERPL-MCNC: 1.53 MG/DL (ref 0.76–1.27)
CREAT SERPL-MCNC: 1.69 MG/DL (ref 0.76–1.27)
CREAT SERPL-MCNC: 1.69 MG/DL (ref 0.76–1.27)
CREAT SERPL-MCNC: 1.79 MG/DL (ref 0.76–1.27)
CREAT SERPL-MCNC: 1.92 MG/DL (ref 0.76–1.27)
CREAT SERPL-MCNC: 1.99 MG/DL (ref 0.76–1.27)
CREAT SERPL-MCNC: 2.02 MG/DL (ref 0.76–1.27)
CREAT SERPL-MCNC: 2.03 MG/DL (ref 0.76–1.27)
CREAT SERPL-MCNC: 2.21 MG/DL (ref 0.76–1.27)
CREAT SERPL-MCNC: 2.26 MG/DL (ref 0.76–1.27)
CREAT SERPL-MCNC: 2.37 MG/DL (ref 0.76–1.27)
CREAT SERPL-MCNC: 2.38 MG/DL (ref 0.76–1.27)
D-LACTATE SERPL-SCNC: 1 MMOL/L (ref 0.5–2)
D-LACTATE SERPL-SCNC: 1.1 MMOL/L (ref 0.5–2)
DEPRECATED RDW RBC AUTO: 42 FL (ref 37–54)
DEPRECATED RDW RBC AUTO: 42.3 FL (ref 37–54)
DEPRECATED RDW RBC AUTO: 42.7 FL (ref 37–54)
DEPRECATED RDW RBC AUTO: 44.3 FL (ref 37–54)
DEPRECATED RDW RBC AUTO: 45.1 FL (ref 37–54)
DEPRECATED RDW RBC AUTO: 45.1 FL (ref 37–54)
DEPRECATED RDW RBC AUTO: 45.2 FL (ref 37–54)
DEPRECATED RDW RBC AUTO: 45.4 FL (ref 37–54)
DEPRECATED RDW RBC AUTO: 45.9 FL (ref 37–54)
DEPRECATED RDW RBC AUTO: 46.9 FL (ref 37–54)
EOSINOPHIL # BLD AUTO: 0 10*3/MM3 (ref 0–0.4)
EOSINOPHIL # BLD AUTO: 0 X10E3/UL (ref 0–0.4)
EOSINOPHIL # BLD AUTO: 0.01 10*3/MM3 (ref 0–0.4)
EOSINOPHIL # BLD AUTO: 0.02 10*3/MM3 (ref 0–0.4)
EOSINOPHIL # BLD AUTO: 0.04 10*3/MM3 (ref 0–0.4)
EOSINOPHIL # BLD AUTO: 0.06 10*3/MM3 (ref 0–0.4)
EOSINOPHIL # BLD AUTO: 0.08 10*3/MM3 (ref 0–0.4)
EOSINOPHIL # BLD AUTO: 0.09 10*3/MM3 (ref 0–0.4)
EOSINOPHIL NFR BLD AUTO: 0 %
EOSINOPHIL NFR BLD AUTO: 0 % (ref 0.3–6.2)
EOSINOPHIL NFR BLD AUTO: 0.1 % (ref 0.3–6.2)
EOSINOPHIL NFR BLD AUTO: 0.2 % (ref 0.3–6.2)
EOSINOPHIL NFR BLD AUTO: 0.4 % (ref 0.3–6.2)
EOSINOPHIL NFR BLD AUTO: 0.4 % (ref 0.3–6.2)
EOSINOPHIL NFR BLD AUTO: 0.7 % (ref 0.3–6.2)
EOSINOPHIL NFR BLD AUTO: 1 % (ref 0.3–6.2)
EPI CELLS #/AREA URNS HPF: ABNORMAL /HPF
EPI CELLS #/AREA URNS HPF: ABNORMAL /HPF (ref 0–10)
ERYTHROCYTE [DISTWIDTH] IN BLOOD BY AUTOMATED COUNT: 11.9 % (ref 12.3–15.4)
ERYTHROCYTE [DISTWIDTH] IN BLOOD BY AUTOMATED COUNT: 11.9 % (ref 12.3–15.4)
ERYTHROCYTE [DISTWIDTH] IN BLOOD BY AUTOMATED COUNT: 12.1 % (ref 12.3–15.4)
ERYTHROCYTE [DISTWIDTH] IN BLOOD BY AUTOMATED COUNT: 12.3 % (ref 11.6–15.4)
ERYTHROCYTE [DISTWIDTH] IN BLOOD BY AUTOMATED COUNT: 12.4 % (ref 12.3–15.4)
ERYTHROCYTE [DISTWIDTH] IN BLOOD BY AUTOMATED COUNT: 12.5 % (ref 12.3–15.4)
ERYTHROCYTE [DISTWIDTH] IN BLOOD BY AUTOMATED COUNT: 12.6 % (ref 12.3–15.4)
ERYTHROCYTE [DISTWIDTH] IN BLOOD BY AUTOMATED COUNT: 12.6 % (ref 12.3–15.4)
ERYTHROCYTE [DISTWIDTH] IN BLOOD BY AUTOMATED COUNT: 12.7 % (ref 12.3–15.4)
ERYTHROCYTE [DISTWIDTH] IN BLOOD BY AUTOMATED COUNT: 12.7 % (ref 12.3–15.4)
ERYTHROCYTE [DISTWIDTH] IN BLOOD BY AUTOMATED COUNT: 12.8 % (ref 12.3–15.4)
GAS FLOW AIRWAY: 2 LPM
GFR SERPL CREATININE-BSD FRML MDRD: 27 ML/MIN/1.73
GFR SERPL CREATININE-BSD FRML MDRD: 29 ML/MIN/1.73
GFR SERPL CREATININE-BSD FRML MDRD: 33 ML/MIN/1.73
GFR SERPL CREATININE-BSD FRML MDRD: 33 ML/MIN/1.73
GFR SERPL CREATININE-BSD FRML MDRD: 35 ML/MIN/1.73
GFR SERPL CREATININE-BSD FRML MDRD: 40 ML/MIN/1.73
GFR SERPL CREATININE-BSD FRML MDRD: 40 ML/MIN/1.73
GFR SERPL CREATININE-BSD FRML MDRD: 45 ML/MIN/1.73
GLOBULIN SER CALC-MCNC: 1.7 GM/DL
GLOBULIN SER CALC-MCNC: 1.8 GM/DL
GLOBULIN SER CALC-MCNC: 2.1 G/DL (ref 1.5–4.5)
GLOBULIN UR ELPH-MCNC: 2.1 GM/DL
GLOBULIN UR ELPH-MCNC: 2.3 GM/DL
GLOBULIN UR ELPH-MCNC: 2.4 GM/DL
GLOBULIN UR ELPH-MCNC: 2.7 GM/DL
GLOBULIN UR ELPH-MCNC: 2.8 GM/DL
GLOBULIN UR ELPH-MCNC: 2.9 GM/DL
GLUCOSE SERPL-MCNC: 105 MG/DL (ref 65–99)
GLUCOSE SERPL-MCNC: 120 MG/DL (ref 65–99)
GLUCOSE SERPL-MCNC: 121 MG/DL (ref 65–99)
GLUCOSE SERPL-MCNC: 123 MG/DL (ref 65–99)
GLUCOSE SERPL-MCNC: 125 MG/DL (ref 65–99)
GLUCOSE SERPL-MCNC: 127 MG/DL (ref 65–99)
GLUCOSE SERPL-MCNC: 129 MG/DL (ref 65–99)
GLUCOSE SERPL-MCNC: 130 MG/DL (ref 65–99)
GLUCOSE SERPL-MCNC: 135 MG/DL (ref 65–99)
GLUCOSE SERPL-MCNC: 162 MG/DL (ref 65–99)
GLUCOSE SERPL-MCNC: 91 MG/DL (ref 65–99)
GLUCOSE SERPL-MCNC: 99 MG/DL (ref 65–99)
GLUCOSE UR QL: NEGATIVE
GLUCOSE UR QL: NEGATIVE
GLUCOSE UR STRIP-MCNC: NEGATIVE MG/DL
HCO3 BLDA-SCNC: 30.1 MMOL/L (ref 22–28)
HCO3 BLDA-SCNC: 33.3 MMOL/L (ref 22–28)
HCT VFR BLD AUTO: 35.9 % (ref 37.5–51)
HCT VFR BLD AUTO: 37.4 % (ref 37.5–51)
HCT VFR BLD AUTO: 37.5 % (ref 37.5–51)
HCT VFR BLD AUTO: 37.8 % (ref 37.5–51)
HCT VFR BLD AUTO: 38.5 % (ref 37.5–51)
HCT VFR BLD AUTO: 39.4 % (ref 37.5–51)
HCT VFR BLD AUTO: 39.5 % (ref 37.5–51)
HCT VFR BLD AUTO: 39.5 % (ref 37.5–51)
HCT VFR BLD AUTO: 39.6 % (ref 37.5–51)
HCT VFR BLD AUTO: 40.7 % (ref 37.5–51)
HCT VFR BLD AUTO: 41.4 % (ref 37.5–51)
HCT VFR BLD AUTO: 42.2 % (ref 37.5–51)
HCT VFR BLD AUTO: 42.9 % (ref 37.5–51)
HCT VFR BLD CALC: 35.8 %
HCT VFR BLD CALC: 39.6 %
HGB BLD-MCNC: 11.4 G/DL (ref 13–17.7)
HGB BLD-MCNC: 11.6 G/DL (ref 13–17.7)
HGB BLD-MCNC: 11.8 G/DL (ref 13–17.7)
HGB BLD-MCNC: 11.8 G/DL (ref 13–17.7)
HGB BLD-MCNC: 12 G/DL (ref 13–17.7)
HGB BLD-MCNC: 12.3 G/DL (ref 13–17.7)
HGB BLD-MCNC: 12.4 G/DL (ref 13–17.7)
HGB BLD-MCNC: 12.5 G/DL (ref 13–17.7)
HGB BLD-MCNC: 12.6 G/DL (ref 13–17.7)
HGB BLD-MCNC: 12.7 G/DL (ref 13–17.7)
HGB BLD-MCNC: 13.1 G/DL (ref 13–17.7)
HGB BLD-MCNC: 13.2 G/DL (ref 13–17.7)
HGB BLD-MCNC: 13.5 G/DL (ref 13–17.7)
HGB UR QL STRIP.AUTO: ABNORMAL
HGB UR QL STRIP.AUTO: NEGATIVE
HGB UR QL STRIP: NEGATIVE
HGB UR QL STRIP: NEGATIVE
HOLD SPECIMEN: NORMAL
HYALINE CASTS UR QL AUTO: ABNORMAL /LPF
HYALINE CASTS UR QL AUTO: ABNORMAL /LPF
IMM GRANULOCYTES # BLD AUTO: 0 X10E3/UL (ref 0–0.1)
IMM GRANULOCYTES # BLD AUTO: 0.02 10*3/MM3 (ref 0–0.05)
IMM GRANULOCYTES # BLD AUTO: 0.02 10*3/MM3 (ref 0–0.05)
IMM GRANULOCYTES # BLD AUTO: 0.05 10*3/MM3 (ref 0–0.05)
IMM GRANULOCYTES # BLD AUTO: 0.07 10*3/MM3 (ref 0–0.05)
IMM GRANULOCYTES # BLD AUTO: 0.14 10*3/MM3 (ref 0–0.05)
IMM GRANULOCYTES NFR BLD AUTO: 0 %
IMM GRANULOCYTES NFR BLD AUTO: 0.2 % (ref 0–0.5)
IMM GRANULOCYTES NFR BLD AUTO: 0.3 % (ref 0–0.5)
IMM GRANULOCYTES NFR BLD AUTO: 0.4 % (ref 0–0.5)
IMM GRANULOCYTES NFR BLD AUTO: 0.5 % (ref 0–0.5)
IMM GRANULOCYTES NFR BLD AUTO: 0.8 % (ref 0–0.5)
IMM GRANULOCYTES NFR BLD AUTO: 0.8 % (ref 0–0.5)
IMM GRANULOCYTES NFR BLD AUTO: 0.9 % (ref 0–0.5)
INHALED O2 CONCENTRATION: 28 %
INHALED O2 CONCENTRATION: 32 %
IRON SATN MFR SERPL: 27 % (ref 20–50)
IRON SERPL-MCNC: 104 MCG/DL (ref 59–158)
KETONES UR QL STRIP: NEGATIVE
KETONES UR QL: NEGATIVE
KETONES UR QL: NEGATIVE
KOH PREP NAIL: ABNORMAL
LAB AP CASE REPORT: NORMAL
LEUKOCYTE EST, POC: ABNORMAL
LEUKOCYTE EST, POC: NEGATIVE
LEUKOCYTE ESTERASE UR QL STRIP.AUTO: ABNORMAL
LEUKOCYTE ESTERASE UR QL STRIP.AUTO: ABNORMAL
LEUKOCYTE ESTERASE UR QL STRIP: ABNORMAL
LEUKOCYTE ESTERASE UR QL STRIP: ABNORMAL
LIPASE SERPL-CCNC: 38 U/L (ref 13–60)
LYMPHOCYTES # BLD AUTO: 0.36 10*3/MM3 (ref 0.7–3.1)
LYMPHOCYTES # BLD AUTO: 0.39 10*3/MM3 (ref 0.7–3.1)
LYMPHOCYTES # BLD AUTO: 0.4 X10E3/UL (ref 0.7–3.1)
LYMPHOCYTES # BLD AUTO: 0.53 10*3/MM3 (ref 0.7–3.1)
LYMPHOCYTES # BLD AUTO: 0.65 10*3/MM3 (ref 0.7–3.1)
LYMPHOCYTES # BLD AUTO: 0.68 10*3/MM3 (ref 0.7–3.1)
LYMPHOCYTES # BLD AUTO: 1.03 10*3/MM3 (ref 0.7–3.1)
LYMPHOCYTES # BLD AUTO: 1.49 10*3/MM3 (ref 0.7–3.1)
LYMPHOCYTES NFR BLD AUTO: 11.7 % (ref 19.6–45.3)
LYMPHOCYTES NFR BLD AUTO: 2.5 % (ref 19.6–45.3)
LYMPHOCYTES NFR BLD AUTO: 4.2 % (ref 19.6–45.3)
LYMPHOCYTES NFR BLD AUTO: 5 %
LYMPHOCYTES NFR BLD AUTO: 7.1 % (ref 19.6–45.3)
LYMPHOCYTES NFR BLD AUTO: 7.5 % (ref 19.6–45.3)
LYMPHOCYTES NFR BLD AUTO: 7.7 % (ref 19.6–45.3)
LYMPHOCYTES NFR BLD AUTO: 7.9 % (ref 19.6–45.3)
Lab: ABNORMAL
MCH RBC QN AUTO: 29.2 PG (ref 26.6–33)
MCH RBC QN AUTO: 29.2 PG (ref 26.6–33)
MCH RBC QN AUTO: 29.7 PG (ref 26.6–33)
MCH RBC QN AUTO: 29.7 PG (ref 26.6–33)
MCH RBC QN AUTO: 29.9 PG (ref 26.6–33)
MCH RBC QN AUTO: 29.9 PG (ref 26.6–33)
MCH RBC QN AUTO: 30 PG (ref 26.6–33)
MCH RBC QN AUTO: 30.2 PG (ref 26.6–33)
MCH RBC QN AUTO: 30.5 PG (ref 26.6–33)
MCH RBC QN AUTO: 30.5 PG (ref 26.6–33)
MCH RBC QN AUTO: 30.6 PG (ref 26.6–33)
MCH RBC QN AUTO: 30.7 PG (ref 26.6–33)
MCH RBC QN AUTO: 31.2 PG (ref 26.6–33)
MCHC RBC AUTO-ENTMCNC: 30.3 G/DL (ref 31.5–35.7)
MCHC RBC AUTO-ENTMCNC: 30.6 G/DL (ref 31.5–35.7)
MCHC RBC AUTO-ENTMCNC: 30.7 G/DL (ref 31.5–35.7)
MCHC RBC AUTO-ENTMCNC: 30.8 G/DL (ref 31.5–35.7)
MCHC RBC AUTO-ENTMCNC: 30.9 G/DL (ref 31.5–35.7)
MCHC RBC AUTO-ENTMCNC: 31.1 G/DL (ref 31.5–35.7)
MCHC RBC AUTO-ENTMCNC: 31.2 G/DL (ref 31.5–35.7)
MCHC RBC AUTO-ENTMCNC: 31.5 G/DL (ref 31.5–35.7)
MCHC RBC AUTO-ENTMCNC: 31.6 G/DL (ref 31.5–35.7)
MCHC RBC AUTO-ENTMCNC: 31.8 G/DL (ref 31.5–35.7)
MCHC RBC AUTO-ENTMCNC: 32 G/DL (ref 31.5–35.7)
MCHC RBC AUTO-ENTMCNC: 32.2 G/DL (ref 31.5–35.7)
MCHC RBC AUTO-ENTMCNC: 33.7 G/DL (ref 31.5–35.7)
MCV RBC AUTO: 90.8 FL (ref 79–97)
MCV RBC AUTO: 91 FL (ref 79–97)
MCV RBC AUTO: 93 FL (ref 79–97)
MCV RBC AUTO: 95.3 FL (ref 79–97)
MCV RBC AUTO: 95.9 FL (ref 79–97)
MCV RBC AUTO: 96.7 FL (ref 79–97)
MCV RBC AUTO: 96.7 FL (ref 79–97)
MCV RBC AUTO: 96.9 FL (ref 79–97)
MCV RBC AUTO: 97.1 FL (ref 79–97)
MCV RBC AUTO: 97.5 FL (ref 79–97)
MCV RBC AUTO: 98 FL (ref 79–97)
MCV RBC AUTO: 98.4 FL (ref 79–97)
MCV RBC AUTO: 99.1 FL (ref 79–97)
METHGB BLD QL: 0.7 % (ref 0–1.5)
METHGB BLD QL: 1.2 % (ref 0–1.5)
METHYLMALONATE SERPL-SCNC: 887 NMOL/L (ref 0–378)
MICRO URNS: ABNORMAL
MODALITY: ABNORMAL
MODALITY: ABNORMAL
MONOCYTES # BLD AUTO: 0.18 10*3/MM3 (ref 0.1–0.9)
MONOCYTES # BLD AUTO: 0.26 10*3/MM3 (ref 0.1–0.9)
MONOCYTES # BLD AUTO: 0.39 10*3/MM3 (ref 0.1–0.9)
MONOCYTES # BLD AUTO: 0.47 10*3/MM3 (ref 0.1–0.9)
MONOCYTES # BLD AUTO: 0.47 10*3/MM3 (ref 0.1–0.9)
MONOCYTES # BLD AUTO: 0.5 X10E3/UL (ref 0.1–0.9)
MONOCYTES # BLD AUTO: 0.56 10*3/MM3 (ref 0.1–0.9)
MONOCYTES # BLD AUTO: 1.77 10*3/MM3 (ref 0.1–0.9)
MONOCYTES NFR BLD AUTO: 1.1 % (ref 5–12)
MONOCYTES NFR BLD AUTO: 3 % (ref 5–12)
MONOCYTES NFR BLD AUTO: 5.2 % (ref 5–12)
MONOCYTES NFR BLD AUTO: 5.3 % (ref 5–12)
MONOCYTES NFR BLD AUTO: 5.7 % (ref 5–12)
MONOCYTES NFR BLD AUTO: 6 %
MONOCYTES NFR BLD AUTO: 6.1 % (ref 5–12)
MONOCYTES NFR BLD AUTO: 9.4 % (ref 5–12)
MUCOUS THREADS URNS QL MICRO: PRESENT
NEUTROPHILS # BLD AUTO: 6.9 X10E3/UL (ref 1.4–7)
NEUTROPHILS # BLD AUTO: 7.77 10*3/MM3 (ref 1.7–7)
NEUTROPHILS # BLD AUTO: 7.84 10*3/MM3 (ref 1.7–7)
NEUTROPHILS NFR BLD AUTO: 15.04 10*3/MM3 (ref 1.7–7)
NEUTROPHILS NFR BLD AUTO: 15.43 10*3/MM3 (ref 1.7–7)
NEUTROPHILS NFR BLD AUTO: 5.88 10*3/MM3 (ref 1.7–7)
NEUTROPHILS NFR BLD AUTO: 7.17 10*3/MM3 (ref 1.7–7)
NEUTROPHILS NFR BLD AUTO: 7.77 10*3/MM3 (ref 1.7–7)
NEUTROPHILS NFR BLD AUTO: 81 % (ref 42.7–76)
NEUTROPHILS NFR BLD AUTO: 81.6 % (ref 42.7–76)
NEUTROPHILS NFR BLD AUTO: 85.2 % (ref 42.7–76)
NEUTROPHILS NFR BLD AUTO: 85.8 % (ref 42.7–76)
NEUTROPHILS NFR BLD AUTO: 86 % (ref 42.7–76)
NEUTROPHILS NFR BLD AUTO: 89 %
NEUTROPHILS NFR BLD AUTO: 91.2 % (ref 42.7–76)
NEUTROPHILS NFR BLD AUTO: 95.4 % (ref 42.7–76)
NITRITE UR QL STRIP: NEGATIVE
NITRITE UR-MCNC: NEGATIVE MG/ML
NITRITE UR-MCNC: NEGATIVE MG/ML
NOTE: ABNORMAL
NOTE: ABNORMAL
NRBC BLD AUTO-RTO: 0 /100 WBC (ref 0–0.2)
NT-PROBNP SERPL-MCNC: 2668 PG/ML (ref 0–900)
NT-PROBNP SERPL-MCNC: 4258 PG/ML (ref 0–900)
NT-PROBNP SERPL-MCNC: 4396 PG/ML (ref 0–376)
NT-PROBNP SERPL-MCNC: 5898 PG/ML (ref 0–900)
OTHER ANTIBIOTIC SUSC ISLT: ABNORMAL
OXYHGB MFR BLDV: 97.4 % (ref 94–99)
OXYHGB MFR BLDV: 98.4 % (ref 94–99)
PATH REPORT.FINAL DX SPEC: NORMAL
PCO2 BLDA: 43.8 MM HG (ref 35–45)
PCO2 BLDA: 63.7 MM HG (ref 35–45)
PCO2 TEMP ADJ BLD: ABNORMAL MM[HG]
PCO2 TEMP ADJ BLD: ABNORMAL MM[HG]
PH BLDA: 7.33 PH UNITS (ref 7.3–7.5)
PH BLDA: 7.45 PH UNITS (ref 7.3–7.5)
PH UR STRIP.AUTO: <=5 [PH] (ref 5–8)
PH UR STRIP.AUTO: <=5 [PH] (ref 5–8)
PH UR STRIP: 5.5 [PH] (ref 5–7.5)
PH UR STRIP: 5.5 [PH] (ref 5–8)
PH UR: 6 [PH] (ref 5–8)
PH UR: 6 [PH] (ref 5–8)
PH, TEMP CORRECTED: ABNORMAL
PH, TEMP CORRECTED: ABNORMAL
PHOSPHATE SERPL-MCNC: 2.8 MG/DL (ref 2.5–4.5)
PHOSPHATE SERPL-MCNC: 4.6 MG/DL (ref 2.5–4.5)
PLATELET # BLD AUTO: 170 10*3/MM3 (ref 140–450)
PLATELET # BLD AUTO: 184 10*3/MM3 (ref 140–450)
PLATELET # BLD AUTO: 191 10*3/MM3 (ref 140–450)
PLATELET # BLD AUTO: 195 10*3/MM3 (ref 140–450)
PLATELET # BLD AUTO: 200 10*3/MM3 (ref 140–450)
PLATELET # BLD AUTO: 204 10*3/MM3 (ref 140–450)
PLATELET # BLD AUTO: 217 10*3/MM3 (ref 140–450)
PLATELET # BLD AUTO: 222 10*3/MM3 (ref 140–450)
PLATELET # BLD AUTO: 229 10*3/MM3 (ref 140–450)
PLATELET # BLD AUTO: 230 10*3/MM3 (ref 140–450)
PLATELET # BLD AUTO: 231 10*3/MM3 (ref 140–450)
PLATELET # BLD AUTO: 231 X10E3/UL (ref 150–450)
PLATELET # BLD AUTO: 232 10*3/MM3 (ref 140–450)
PMV BLD AUTO: 8.4 FL (ref 6–12)
PMV BLD AUTO: 8.6 FL (ref 6–12)
PMV BLD AUTO: 8.6 FL (ref 6–12)
PMV BLD AUTO: 8.7 FL (ref 6–12)
PMV BLD AUTO: 8.8 FL (ref 6–12)
PMV BLD AUTO: 8.8 FL (ref 6–12)
PMV BLD AUTO: 9 FL (ref 6–12)
PO2 BLDA: 147 MM HG (ref 75–100)
PO2 BLDA: 159 MM HG (ref 75–100)
PO2 TEMP ADJ BLD: ABNORMAL MM[HG]
PO2 TEMP ADJ BLD: ABNORMAL MM[HG]
POTASSIUM SERPL-SCNC: 4.2 MMOL/L (ref 3.5–5.2)
POTASSIUM SERPL-SCNC: 4.4 MMOL/L (ref 3.5–5.2)
POTASSIUM SERPL-SCNC: 4.5 MMOL/L (ref 3.5–5.2)
POTASSIUM SERPL-SCNC: 4.6 MMOL/L (ref 3.5–5.2)
POTASSIUM SERPL-SCNC: 4.8 MMOL/L (ref 3.5–5.2)
POTASSIUM SERPL-SCNC: 4.9 MMOL/L (ref 3.5–5.2)
POTASSIUM SERPL-SCNC: 5.2 MMOL/L (ref 3.5–5.2)
POTASSIUM SERPL-SCNC: 5.4 MMOL/L (ref 3.5–5.2)
POTASSIUM SERPL-SCNC: 5.4 MMOL/L (ref 3.5–5.2)
POTASSIUM SERPL-SCNC: 5.5 MMOL/L (ref 3.5–5.2)
PROCALCITONIN SERPL-MCNC: 0.11 NG/ML (ref 0–0.25)
PROT SERPL-MCNC: 6.1 G/DL (ref 6–8.5)
PROT SERPL-MCNC: 6.5 G/DL (ref 6–8.5)
PROT SERPL-MCNC: 6.6 G/DL (ref 6–8.5)
PROT SERPL-MCNC: 6.6 G/DL (ref 6–8.5)
PROT SERPL-MCNC: 6.7 G/DL (ref 6–8.5)
PROT SERPL-MCNC: 6.7 G/DL (ref 6–8.5)
PROT SERPL-MCNC: 6.8 G/DL (ref 6–8.5)
PROT SERPL-MCNC: 7.3 G/DL (ref 6–8.5)
PROT SERPL-MCNC: 7.6 G/DL (ref 6–8.5)
PROT UR QL STRIP: ABNORMAL
PROT UR QL STRIP: NEGATIVE
PROT UR STRIP-MCNC: NEGATIVE MG/DL
PROT UR STRIP-MCNC: NEGATIVE MG/DL
RBC # BLD AUTO: 3.65 10*6/MM3 (ref 4.14–5.8)
RBC # BLD AUTO: 3.87 10*6/MM3 (ref 4.14–5.8)
RBC # BLD AUTO: 3.94 10*6/MM3 (ref 4.14–5.8)
RBC # BLD AUTO: 3.95 10*6/MM3 (ref 4.14–5.8)
RBC # BLD AUTO: 4.04 10*6/MM3 (ref 4.14–5.8)
RBC # BLD AUTO: 4.07 10*6/MM3 (ref 4.14–5.8)
RBC # BLD AUTO: 4.11 10*6/MM3 (ref 4.14–5.8)
RBC # BLD AUTO: 4.21 10*6/MM3 (ref 4.14–5.8)
RBC # BLD AUTO: 4.25 X10E6/UL (ref 4.14–5.8)
RBC # BLD AUTO: 4.28 10*6/MM3 (ref 4.14–5.8)
RBC # BLD AUTO: 4.33 10*6/MM3 (ref 4.14–5.8)
RBC # BLD AUTO: 4.35 10*6/MM3 (ref 4.14–5.8)
RBC # BLD AUTO: 4.43 10*6/MM3 (ref 4.14–5.8)
RBC # UR STRIP: ABNORMAL /UL
RBC # UR STRIP: NEGATIVE /UL
RBC # UR: ABNORMAL /HPF
RBC # UR: ABNORMAL /HPF
RBC #/AREA URNS HPF: ABNORMAL /HPF
RBC #/AREA URNS HPF: ABNORMAL /HPF (ref 0–2)
REF LAB TEST METHOD: ABNORMAL
REF LAB TEST METHOD: ABNORMAL
SAO2 % BLDCOA: 99.2 % (ref 94–100)
SAO2 % BLDCOA: 99.6 % (ref 94–100)
SARS-COV-2 RNA PNL SPEC NAA+PROBE: NOT DETECTED
SARS-COV-2 RNA PNL SPEC NAA+PROBE: NOT DETECTED
SARS-COV-2 RNA RESP QL NAA+PROBE: NOT DETECTED
SODIUM SERPL-SCNC: 139 MMOL/L (ref 136–145)
SODIUM SERPL-SCNC: 139 MMOL/L (ref 136–145)
SODIUM SERPL-SCNC: 140 MMOL/L (ref 136–145)
SODIUM SERPL-SCNC: 141 MMOL/L (ref 136–145)
SODIUM SERPL-SCNC: 142 MMOL/L (ref 136–145)
SODIUM SERPL-SCNC: 144 MMOL/L (ref 134–144)
SP GR UR STRIP: 1.01 (ref 1–1.03)
SP GR UR STRIP: 1.02 (ref 1–1.03)
SP GR UR: 1.01 (ref 1–1.03)
SP GR UR: 1.01 (ref 1–1.03)
SP GR UR: 1.02 (ref 1–1.03)
SP GR UR: 1.02 (ref 1–1.03)
SQUAMOUS #/AREA URNS HPF: ABNORMAL /HPF
SQUAMOUS #/AREA URNS HPF: ABNORMAL /HPF
TIBC SERPL-MCNC: 386 MCG/DL
TROPONIN T SERPL-MCNC: 0.03 NG/ML (ref 0–0.03)
TROPONIN T SERPL-MCNC: 0.04 NG/ML (ref 0–0.03)
TSH SERPL DL<=0.005 MIU/L-ACNC: 1.21 UIU/ML (ref 0.45–4.5)
UIBC SERPL-MCNC: 282 MCG/DL (ref 112–346)
URATE SERPL-MCNC: 10.1 MG/DL (ref 3.4–7)
URATE SERPL-MCNC: 10.4 MG/DL (ref 3.4–7)
UROBILINOGEN UR QL STRIP: ABNORMAL
UROBILINOGEN UR QL STRIP: ABNORMAL
UROBILINOGEN UR QL: NORMAL
UROBILINOGEN UR QL: NORMAL
UROBILINOGEN UR STRIP-MCNC: 0.2 MG/DL (ref 0.2–1)
UROBILINOGEN UR STRIP-MCNC: ABNORMAL MG/DL
VENTILATOR MODE: ABNORMAL
VENTILATOR MODE: ABNORMAL
VIT B12 SERPL-MCNC: 449 PG/ML (ref 211–946)
WBC # BLD AUTO: 11.41 10*3/MM3 (ref 3.4–10.8)
WBC # BLD AUTO: 15.76 10*3/MM3 (ref 3.4–10.8)
WBC # BLD AUTO: 18.89 10*3/MM3 (ref 3.4–10.8)
WBC # BLD AUTO: 6.86 10*3/MM3 (ref 3.4–10.8)
WBC # BLD AUTO: 7.15 10*3/MM3 (ref 3.4–10.8)
WBC # BLD AUTO: 7.17 10*3/MM3 (ref 3.4–10.8)
WBC # BLD AUTO: 7.18 10*3/MM3 (ref 3.4–10.8)
WBC # BLD AUTO: 7.51 10*3/MM3 (ref 3.4–10.8)
WBC # BLD AUTO: 7.8 X10E3/UL (ref 3.4–10.8)
WBC # BLD AUTO: 8.53 10*3/MM3 (ref 3.4–10.8)
WBC # BLD AUTO: 8.84 10*3/MM3 (ref 3.4–10.8)
WBC # BLD AUTO: 9.12 10*3/MM3 (ref 3.4–10.8)
WBC # BLD AUTO: 9.13 10*3/MM3 (ref 3.4–10.8)
WBC #/AREA URNS HPF: >30 /HPF (ref 0–5)
WBC #/AREA URNS HPF: ABNORMAL /HPF
WBC UR QL AUTO: ABNORMAL /HPF
WBC UR QL AUTO: ABNORMAL /HPF
WHOLE BLOOD HOLD SPECIMEN: NORMAL

## 2020-01-01 PROCEDURE — C9803 HOPD COVID-19 SPEC COLLECT: HCPCS

## 2020-01-01 PROCEDURE — 85025 COMPLETE CBC W/AUTO DIFF WBC: CPT

## 2020-01-01 PROCEDURE — 80053 COMPREHEN METABOLIC PANEL: CPT | Performed by: INTERNAL MEDICINE

## 2020-01-01 PROCEDURE — 99495 TRANSJ CARE MGMT MOD F2F 14D: CPT | Performed by: INTERNAL MEDICINE

## 2020-01-01 PROCEDURE — 71250 CT THORAX DX C-: CPT

## 2020-01-01 PROCEDURE — 80053 COMPREHEN METABOLIC PANEL: CPT

## 2020-01-01 PROCEDURE — 80053 COMPREHEN METABOLIC PANEL: CPT | Performed by: STUDENT IN AN ORGANIZED HEALTH CARE EDUCATION/TRAINING PROGRAM

## 2020-01-01 PROCEDURE — 99284 EMERGENCY DEPT VISIT MOD MDM: CPT

## 2020-01-01 PROCEDURE — 85027 COMPLETE CBC AUTOMATED: CPT | Performed by: INTERNAL MEDICINE

## 2020-01-01 PROCEDURE — 87635 SARS-COV-2 COVID-19 AMP PRB: CPT | Performed by: EMERGENCY MEDICINE

## 2020-01-01 PROCEDURE — 94799 UNLISTED PULMONARY SVC/PX: CPT

## 2020-01-01 PROCEDURE — 43239 EGD BIOPSY SINGLE/MULTIPLE: CPT | Performed by: INTERNAL MEDICINE

## 2020-01-01 PROCEDURE — 71046 X-RAY EXAM CHEST 2 VIEWS: CPT

## 2020-01-01 PROCEDURE — 87220 TISSUE EXAM FOR FUNGI: CPT | Performed by: INTERNAL MEDICINE

## 2020-01-01 PROCEDURE — 97530 THERAPEUTIC ACTIVITIES: CPT

## 2020-01-01 PROCEDURE — 99238 HOSP IP/OBS DSCHRG MGMT 30/<: CPT | Performed by: NURSE PRACTITIONER

## 2020-01-01 PROCEDURE — 84145 PROCALCITONIN (PCT): CPT | Performed by: EMERGENCY MEDICINE

## 2020-01-01 PROCEDURE — 76705 ECHO EXAM OF ABDOMEN: CPT

## 2020-01-01 PROCEDURE — G0008 ADMIN INFLUENZA VIRUS VAC: HCPCS | Performed by: INTERNAL MEDICINE

## 2020-01-01 PROCEDURE — 97165 OT EVAL LOW COMPLEX 30 MIN: CPT

## 2020-01-01 PROCEDURE — 84484 ASSAY OF TROPONIN QUANT: CPT | Performed by: INTERNAL MEDICINE

## 2020-01-01 PROCEDURE — 87635 SARS-COV-2 COVID-19 AMP PRB: CPT | Performed by: STUDENT IN AN ORGANIZED HEALTH CARE EDUCATION/TRAINING PROGRAM

## 2020-01-01 PROCEDURE — 99214 OFFICE O/P EST MOD 30 MIN: CPT | Performed by: INTERNAL MEDICINE

## 2020-01-01 PROCEDURE — 25010000002 FENTANYL CITRATE (PF) 100 MCG/2ML SOLUTION: Performed by: EMERGENCY MEDICINE

## 2020-01-01 PROCEDURE — 85027 COMPLETE CBC AUTOMATED: CPT | Performed by: PHYSICIAN ASSISTANT

## 2020-01-01 PROCEDURE — 83605 ASSAY OF LACTIC ACID: CPT | Performed by: STUDENT IN AN ORGANIZED HEALTH CARE EDUCATION/TRAINING PROGRAM

## 2020-01-01 PROCEDURE — 93005 ELECTROCARDIOGRAM TRACING: CPT

## 2020-01-01 PROCEDURE — 90653 IIV ADJUVANT VACCINE IM: CPT | Performed by: INTERNAL MEDICINE

## 2020-01-01 PROCEDURE — 85025 COMPLETE CBC W/AUTO DIFF WBC: CPT | Performed by: EMERGENCY MEDICINE

## 2020-01-01 PROCEDURE — 25010000002 ENOXAPARIN PER 10 MG: Performed by: INTERNAL MEDICINE

## 2020-01-01 PROCEDURE — 99214 OFFICE O/P EST MOD 30 MIN: CPT | Performed by: NURSE PRACTITIONER

## 2020-01-01 PROCEDURE — 0 TECHNETIUM TC 99M MEBROFENIN KIT: Performed by: INTERNAL MEDICINE

## 2020-01-01 PROCEDURE — 93005 ELECTROCARDIOGRAM TRACING: CPT | Performed by: EMERGENCY MEDICINE

## 2020-01-01 PROCEDURE — 25010000002 PROPOFOL 1000 MG/100ML EMULSION: Performed by: NURSE ANESTHETIST, CERTIFIED REGISTERED

## 2020-01-01 PROCEDURE — 82805 BLOOD GASES W/O2 SATURATION: CPT

## 2020-01-01 PROCEDURE — 36415 COLL VENOUS BLD VENIPUNCTURE: CPT

## 2020-01-01 PROCEDURE — 94726 PLETHYSMOGRAPHY LUNG VOLUMES: CPT | Performed by: INTERNAL MEDICINE

## 2020-01-01 PROCEDURE — 84550 ASSAY OF BLOOD/URIC ACID: CPT | Performed by: INTERNAL MEDICINE

## 2020-01-01 PROCEDURE — 99232 SBSQ HOSP IP/OBS MODERATE 35: CPT | Performed by: NURSE PRACTITIONER

## 2020-01-01 PROCEDURE — G0463 HOSPITAL OUTPT CLINIC VISIT: HCPCS

## 2020-01-01 PROCEDURE — 94640 AIRWAY INHALATION TREATMENT: CPT

## 2020-01-01 PROCEDURE — 71045 X-RAY EXAM CHEST 1 VIEW: CPT

## 2020-01-01 PROCEDURE — 99213 OFFICE O/P EST LOW 20 MIN: CPT | Performed by: INTERNAL MEDICINE

## 2020-01-01 PROCEDURE — 80069 RENAL FUNCTION PANEL: CPT | Performed by: INTERNAL MEDICINE

## 2020-01-01 PROCEDURE — 94060 EVALUATION OF WHEEZING: CPT | Performed by: INTERNAL MEDICINE

## 2020-01-01 PROCEDURE — 80048 BASIC METABOLIC PNL TOTAL CA: CPT | Performed by: INTERNAL MEDICINE

## 2020-01-01 PROCEDURE — 72125 CT NECK SPINE W/O DYE: CPT

## 2020-01-01 PROCEDURE — 25010000002 MAGNESIUM SULFATE 2 GM/50ML SOLUTION: Performed by: STUDENT IN AN ORGANIZED HEALTH CARE EDUCATION/TRAINING PROGRAM

## 2020-01-01 PROCEDURE — 83880 ASSAY OF NATRIURETIC PEPTIDE: CPT | Performed by: STUDENT IN AN ORGANIZED HEALTH CARE EDUCATION/TRAINING PROGRAM

## 2020-01-01 PROCEDURE — 87077 CULTURE AEROBIC IDENTIFY: CPT | Performed by: STUDENT IN AN ORGANIZED HEALTH CARE EDUCATION/TRAINING PROGRAM

## 2020-01-01 PROCEDURE — 82375 ASSAY CARBOXYHB QUANT: CPT

## 2020-01-01 PROCEDURE — 94729 DIFFUSING CAPACITY: CPT | Performed by: INTERNAL MEDICINE

## 2020-01-01 PROCEDURE — 25010000002 CEFTRIAXONE SODIUM-DEXTROSE 1-3.74 GM-%(50ML) RECONSTITUTED SOLUTION: Performed by: STUDENT IN AN ORGANIZED HEALTH CARE EDUCATION/TRAINING PROGRAM

## 2020-01-01 PROCEDURE — 93280 PM DEVICE PROGR EVAL DUAL: CPT | Performed by: INTERNAL MEDICINE

## 2020-01-01 PROCEDURE — 94664 DEMO&/EVAL PT USE INHALER: CPT

## 2020-01-01 PROCEDURE — 25010000002 METHYLPREDNISOLONE PER 125 MG: Performed by: EMERGENCY MEDICINE

## 2020-01-01 PROCEDURE — 70450 CT HEAD/BRAIN W/O DYE: CPT

## 2020-01-01 PROCEDURE — A9537 TC99M MEBROFENIN: HCPCS | Performed by: INTERNAL MEDICINE

## 2020-01-01 PROCEDURE — 87086 URINE CULTURE/COLONY COUNT: CPT | Performed by: STUDENT IN AN ORGANIZED HEALTH CARE EDUCATION/TRAINING PROGRAM

## 2020-01-01 PROCEDURE — 96374 THER/PROPH/DIAG INJ IV PUSH: CPT

## 2020-01-01 PROCEDURE — 78226 HEPATOBILIARY SYSTEM IMAGING: CPT

## 2020-01-01 PROCEDURE — 25010000002 METHYLPREDNISOLONE PER 125 MG: Performed by: STUDENT IN AN ORGANIZED HEALTH CARE EDUCATION/TRAINING PROGRAM

## 2020-01-01 PROCEDURE — 74176 CT ABD & PELVIS W/O CONTRAST: CPT

## 2020-01-01 PROCEDURE — 83605 ASSAY OF LACTIC ACID: CPT | Performed by: EMERGENCY MEDICINE

## 2020-01-01 PROCEDURE — 25010000002 CEFTRIAXONE SODIUM-DEXTROSE 1-3.74 GM-%(50ML) RECONSTITUTED SOLUTION: Performed by: INTERNAL MEDICINE

## 2020-01-01 PROCEDURE — 83690 ASSAY OF LIPASE: CPT | Performed by: INTERNAL MEDICINE

## 2020-01-01 PROCEDURE — 81003 URINALYSIS AUTO W/O SCOPE: CPT | Performed by: INTERNAL MEDICINE

## 2020-01-01 PROCEDURE — U0004 COV-19 TEST NON-CDC HGH THRU: HCPCS | Performed by: NURSE PRACTITIONER

## 2020-01-01 PROCEDURE — 84484 ASSAY OF TROPONIN QUANT: CPT | Performed by: STUDENT IN AN ORGANIZED HEALTH CARE EDUCATION/TRAINING PROGRAM

## 2020-01-01 PROCEDURE — 84484 ASSAY OF TROPONIN QUANT: CPT | Performed by: EMERGENCY MEDICINE

## 2020-01-01 PROCEDURE — 81001 URINALYSIS AUTO W/SCOPE: CPT | Performed by: STUDENT IN AN ORGANIZED HEALTH CARE EDUCATION/TRAINING PROGRAM

## 2020-01-01 PROCEDURE — 87186 SC STD MICRODIL/AGAR DIL: CPT | Performed by: STUDENT IN AN ORGANIZED HEALTH CARE EDUCATION/TRAINING PROGRAM

## 2020-01-01 PROCEDURE — 80053 COMPREHEN METABOLIC PANEL: CPT | Performed by: EMERGENCY MEDICINE

## 2020-01-01 PROCEDURE — 93005 ELECTROCARDIOGRAM TRACING: CPT | Performed by: STUDENT IN AN ORGANIZED HEALTH CARE EDUCATION/TRAINING PROGRAM

## 2020-01-01 PROCEDURE — G0297 LDCT FOR LUNG CA SCREEN: HCPCS

## 2020-01-01 PROCEDURE — 83880 ASSAY OF NATRIURETIC PEPTIDE: CPT

## 2020-01-01 PROCEDURE — 97110 THERAPEUTIC EXERCISES: CPT

## 2020-01-01 PROCEDURE — 85025 COMPLETE CBC W/AUTO DIFF WBC: CPT | Performed by: INTERNAL MEDICINE

## 2020-01-01 PROCEDURE — 36600 WITHDRAWAL OF ARTERIAL BLOOD: CPT

## 2020-01-01 PROCEDURE — 83050 HGB METHEMOGLOBIN QUAN: CPT

## 2020-01-01 PROCEDURE — 25010000002 PROPOFOL 200 MG/20ML EMULSION: Performed by: NURSE ANESTHETIST, CERTIFIED REGISTERED

## 2020-01-01 PROCEDURE — 94660 CPAP INITIATION&MGMT: CPT

## 2020-01-01 PROCEDURE — 81001 URINALYSIS AUTO W/SCOPE: CPT | Performed by: EMERGENCY MEDICINE

## 2020-01-01 PROCEDURE — 87040 BLOOD CULTURE FOR BACTERIA: CPT | Performed by: STUDENT IN AN ORGANIZED HEALTH CARE EDUCATION/TRAINING PROGRAM

## 2020-01-01 PROCEDURE — 87086 URINE CULTURE/COLONY COUNT: CPT | Performed by: EMERGENCY MEDICINE

## 2020-01-01 PROCEDURE — 43248 EGD GUIDE WIRE INSERTION: CPT | Performed by: INTERNAL MEDICINE

## 2020-01-01 PROCEDURE — 99222 1ST HOSP IP/OBS MODERATE 55: CPT | Performed by: INTERNAL MEDICINE

## 2020-01-01 PROCEDURE — 99285 EMERGENCY DEPT VISIT HI MDM: CPT

## 2020-01-01 PROCEDURE — 88305 TISSUE EXAM BY PATHOLOGIST: CPT | Performed by: INTERNAL MEDICINE

## 2020-01-01 PROCEDURE — 99283 EMERGENCY DEPT VISIT LOW MDM: CPT

## 2020-01-01 PROCEDURE — 84484 ASSAY OF TROPONIN QUANT: CPT

## 2020-01-01 PROCEDURE — 85025 COMPLETE CBC W/AUTO DIFF WBC: CPT | Performed by: STUDENT IN AN ORGANIZED HEALTH CARE EDUCATION/TRAINING PROGRAM

## 2020-01-01 PROCEDURE — 97161 PT EVAL LOW COMPLEX 20 MIN: CPT

## 2020-01-01 PROCEDURE — 83880 ASSAY OF NATRIURETIC PEPTIDE: CPT | Performed by: EMERGENCY MEDICINE

## 2020-01-01 RX ORDER — BENZONATATE 200 MG/1
CAPSULE ORAL
Qty: 45 CAPSULE | Refills: 0 | Status: SHIPPED | OUTPATIENT
Start: 2020-01-01 | End: 2020-01-01

## 2020-01-01 RX ORDER — ESCITALOPRAM OXALATE 10 MG/1
20 TABLET ORAL DAILY
Status: DISCONTINUED | OUTPATIENT
Start: 2020-01-01 | End: 2020-01-01 | Stop reason: HOSPADM

## 2020-01-01 RX ORDER — TRAZODONE HYDROCHLORIDE 50 MG/1
50 TABLET ORAL NIGHTLY
Status: DISCONTINUED | OUTPATIENT
Start: 2020-01-01 | End: 2020-01-01 | Stop reason: HOSPADM

## 2020-01-01 RX ORDER — CEFTRIAXONE 1 G/50ML
1 INJECTION, SOLUTION INTRAVENOUS ONCE
Status: COMPLETED | OUTPATIENT
Start: 2020-01-01 | End: 2020-01-01

## 2020-01-01 RX ORDER — CYCLOBENZAPRINE HCL 5 MG
5 TABLET ORAL 3 TIMES DAILY PRN
COMMUNITY

## 2020-01-01 RX ORDER — DOCUSATE SODIUM 100 MG/1
100 CAPSULE, LIQUID FILLED ORAL 2 TIMES DAILY PRN
Qty: 100 CAPSULE | Refills: 3 | Status: ON HOLD | OUTPATIENT
Start: 2020-01-01 | End: 2021-01-01

## 2020-01-01 RX ORDER — POTASSIUM CHLORIDE 750 MG/1
TABLET, FILM COATED, EXTENDED RELEASE ORAL
Qty: 30 TABLET | Refills: 1 | Status: SHIPPED | OUTPATIENT
Start: 2020-01-01 | End: 2020-01-01

## 2020-01-01 RX ORDER — POTASSIUM CHLORIDE 750 MG/1
10 TABLET, EXTENDED RELEASE ORAL DAILY
Qty: 30 TABLET | Refills: 1 | Status: SHIPPED | OUTPATIENT
Start: 2020-01-01 | End: 2020-01-01

## 2020-01-01 RX ORDER — SODIUM CHLORIDE 0.9 % (FLUSH) 0.9 %
10 SYRINGE (ML) INJECTION AS NEEDED
Status: DISCONTINUED | OUTPATIENT
Start: 2020-01-01 | End: 2020-01-01 | Stop reason: HOSPADM

## 2020-01-01 RX ORDER — METHYLPREDNISOLONE 4 MG/1
TABLET ORAL
Qty: 21 TABLET | Refills: 0 | Status: SHIPPED | OUTPATIENT
Start: 2020-01-01 | End: 2021-01-01

## 2020-01-01 RX ORDER — FUROSEMIDE 20 MG/1
TABLET ORAL
Qty: 30 TABLET | Refills: 1 | Status: SHIPPED | OUTPATIENT
Start: 2020-01-01 | End: 2020-01-01

## 2020-01-01 RX ORDER — BENZONATATE 200 MG/1
200 CAPSULE ORAL 3 TIMES DAILY PRN
Qty: 45 CAPSULE | Refills: 0 | Status: SHIPPED | OUTPATIENT
Start: 2020-01-01 | End: 2020-01-01

## 2020-01-01 RX ORDER — LIDOCAINE 50 MG/G
PATCH TOPICAL
Qty: 30 PATCH | Refills: 3 | Status: SHIPPED | OUTPATIENT
Start: 2020-01-01 | End: 2021-01-01

## 2020-01-01 RX ORDER — PREDNISONE 20 MG/1
40 TABLET ORAL 2 TIMES DAILY
Qty: 10 TABLET | Refills: 0 | Status: SHIPPED | OUTPATIENT
Start: 2020-01-01 | End: 2020-01-01

## 2020-01-01 RX ORDER — POLYETHYLENE GLYCOL 3350 17 G/17G
17 POWDER, FOR SOLUTION ORAL DAILY
Qty: 30 EACH | Refills: 11 | Status: SHIPPED | OUTPATIENT
Start: 2020-01-01 | End: 2021-01-01 | Stop reason: SDUPTHER

## 2020-01-01 RX ORDER — ATORVASTATIN CALCIUM 10 MG/1
10 TABLET, FILM COATED ORAL EVERY EVENING
Status: DISCONTINUED | OUTPATIENT
Start: 2020-01-01 | End: 2020-01-01 | Stop reason: HOSPADM

## 2020-01-01 RX ORDER — SODIUM CHLORIDE 9 MG/ML
70 INJECTION, SOLUTION INTRAVENOUS CONTINUOUS PRN
Status: CANCELLED | OUTPATIENT
Start: 2020-01-01

## 2020-01-01 RX ORDER — TRAZODONE HYDROCHLORIDE 50 MG/1
TABLET ORAL
Qty: 90 TABLET | Refills: 3 | Status: SHIPPED | OUTPATIENT
Start: 2020-01-01 | End: 2021-01-01 | Stop reason: HOSPADM

## 2020-01-01 RX ORDER — CEFDINIR 300 MG/1
300 CAPSULE ORAL 2 TIMES DAILY
Qty: 7 CAPSULE | Refills: 0 | Status: SHIPPED | OUTPATIENT
Start: 2020-01-01 | End: 2020-01-01

## 2020-01-01 RX ORDER — METHYLPREDNISOLONE SODIUM SUCCINATE 125 MG/2ML
125 INJECTION, POWDER, LYOPHILIZED, FOR SOLUTION INTRAMUSCULAR; INTRAVENOUS ONCE
Status: COMPLETED | OUTPATIENT
Start: 2020-01-01 | End: 2020-01-01

## 2020-01-01 RX ORDER — MIRTAZAPINE 15 MG/1
15 TABLET, FILM COATED ORAL NIGHTLY
Status: ON HOLD | COMMUNITY
End: 2020-01-01

## 2020-01-01 RX ORDER — IPRATROPIUM BROMIDE AND ALBUTEROL SULFATE 2.5; .5 MG/3ML; MG/3ML
3 SOLUTION RESPIRATORY (INHALATION) EVERY 4 HOURS PRN
Qty: 360 ML | Refills: 6 | Status: SHIPPED | OUTPATIENT
Start: 2020-01-01

## 2020-01-01 RX ORDER — HYDROCODONE BITARTRATE AND ACETAMINOPHEN 5; 325 MG/1; MG/1
1 TABLET ORAL EVERY 8 HOURS PRN
Qty: 45 TABLET | Refills: 0 | Status: SHIPPED | OUTPATIENT
Start: 2020-01-01 | End: 2020-01-01

## 2020-01-01 RX ORDER — DOCUSATE SODIUM 100 MG/1
100 CAPSULE, LIQUID FILLED ORAL 2 TIMES DAILY
COMMUNITY
End: 2020-01-01 | Stop reason: SDUPTHER

## 2020-01-01 RX ORDER — MIRTAZAPINE 15 MG/1
15 TABLET, FILM COATED ORAL NIGHTLY
Status: DISCONTINUED | OUTPATIENT
Start: 2020-01-01 | End: 2020-01-01 | Stop reason: HOSPADM

## 2020-01-01 RX ORDER — FUROSEMIDE 20 MG/1
TABLET ORAL
Qty: 28 TABLET | Refills: 1 | Status: SHIPPED | OUTPATIENT
Start: 2020-01-01 | End: 2021-01-01

## 2020-01-01 RX ORDER — FUROSEMIDE 20 MG/1
20 TABLET ORAL DAILY
Status: DISCONTINUED | OUTPATIENT
Start: 2020-01-01 | End: 2020-01-01 | Stop reason: HOSPADM

## 2020-01-01 RX ORDER — PROMETHAZINE HYDROCHLORIDE 25 MG/1
TABLET ORAL
Qty: 15 TABLET | Refills: 0 | Status: SHIPPED | OUTPATIENT
Start: 2020-01-01

## 2020-01-01 RX ORDER — ASPIRIN 81 MG
TABLET,CHEWABLE ORAL
Qty: 90 TABLET | Refills: 3 | Status: SHIPPED | OUTPATIENT
Start: 2020-01-01

## 2020-01-01 RX ORDER — ALBUTEROL SULFATE 90 UG/1
6 AEROSOL, METERED RESPIRATORY (INHALATION) ONCE
Status: COMPLETED | OUTPATIENT
Start: 2020-01-01 | End: 2020-01-01

## 2020-01-01 RX ORDER — LIDOCAINE HYDROCHLORIDE 20 MG/ML
INJECTION, SOLUTION INTRAVENOUS AS NEEDED
Status: DISCONTINUED | OUTPATIENT
Start: 2020-01-01 | End: 2020-01-01 | Stop reason: SURG

## 2020-01-01 RX ORDER — FINASTERIDE 5 MG/1
5 TABLET, FILM COATED ORAL DAILY
Status: DISCONTINUED | OUTPATIENT
Start: 2020-01-01 | End: 2020-01-01 | Stop reason: HOSPADM

## 2020-01-01 RX ORDER — FUROSEMIDE 20 MG/1
20 TABLET ORAL DAILY
Qty: 30 TABLET | Refills: 1 | Status: SHIPPED | OUTPATIENT
Start: 2020-01-01 | End: 2020-01-01

## 2020-01-01 RX ORDER — BENZONATATE 100 MG/1
200 CAPSULE ORAL 3 TIMES DAILY PRN
Status: DISCONTINUED | OUTPATIENT
Start: 2020-01-01 | End: 2020-01-01 | Stop reason: HOSPADM

## 2020-01-01 RX ORDER — PANTOPRAZOLE SODIUM 40 MG/1
40 TABLET, DELAYED RELEASE ORAL DAILY
Status: DISCONTINUED | OUTPATIENT
Start: 2020-01-01 | End: 2020-01-01 | Stop reason: HOSPADM

## 2020-01-01 RX ORDER — SODIUM CHLORIDE 0.9 % (FLUSH) 0.9 %
10 SYRINGE (ML) INJECTION EVERY 12 HOURS SCHEDULED
Status: DISCONTINUED | OUTPATIENT
Start: 2020-01-01 | End: 2020-01-01 | Stop reason: HOSPADM

## 2020-01-01 RX ORDER — POTASSIUM CHLORIDE 750 MG/1
TABLET, FILM COATED, EXTENDED RELEASE ORAL
Qty: 28 TABLET | Refills: 1 | Status: SHIPPED | OUTPATIENT
Start: 2020-01-01 | End: 2021-01-01

## 2020-01-01 RX ORDER — TRAZODONE HYDROCHLORIDE 50 MG/1
50 TABLET ORAL NIGHTLY
Status: ON HOLD | COMMUNITY
End: 2020-01-01

## 2020-01-01 RX ORDER — CHOLECALCIFEROL (VITAMIN D3) 125 MCG
CAPSULE ORAL
Qty: 90 TABLET | Refills: 3 | Status: SHIPPED | OUTPATIENT
Start: 2020-01-01

## 2020-01-01 RX ORDER — ASPIRIN 81 MG/1
81 TABLET, CHEWABLE ORAL DAILY
Status: DISCONTINUED | OUTPATIENT
Start: 2020-01-01 | End: 2020-01-01 | Stop reason: HOSPADM

## 2020-01-01 RX ORDER — BENZONATATE 200 MG/1
CAPSULE ORAL
Qty: 45 CAPSULE | Refills: 0 | OUTPATIENT
Start: 2020-01-01

## 2020-01-01 RX ORDER — ONDANSETRON 2 MG/ML
4 INJECTION INTRAMUSCULAR; INTRAVENOUS EVERY 6 HOURS PRN
Status: DISCONTINUED | OUTPATIENT
Start: 2020-01-01 | End: 2020-01-01 | Stop reason: HOSPADM

## 2020-01-01 RX ORDER — AZITHROMYCIN 500 MG/1
500 TABLET, FILM COATED ORAL DAILY
Qty: 5 TABLET | Refills: 0 | Status: SHIPPED | OUTPATIENT
Start: 2020-01-01 | End: 2020-01-01

## 2020-01-01 RX ORDER — FERROUS SULFATE TAB EC 324 MG (65 MG FE EQUIVALENT) 324 (65 FE) MG
324 TABLET DELAYED RESPONSE ORAL
Status: DISCONTINUED | OUTPATIENT
Start: 2020-01-01 | End: 2020-01-01 | Stop reason: HOSPADM

## 2020-01-01 RX ORDER — HYDROCODONE BITARTRATE AND ACETAMINOPHEN 5; 325 MG/1; MG/1
TABLET ORAL
Qty: 45 TABLET | Refills: 0 | Status: SHIPPED | OUTPATIENT
Start: 2020-01-01 | End: 2021-01-01

## 2020-01-01 RX ORDER — SULFAMETHOXAZOLE AND TRIMETHOPRIM 400; 80 MG/1; MG/1
1 TABLET ORAL 2 TIMES DAILY
Qty: 14 TABLET | Refills: 0 | Status: SHIPPED | OUTPATIENT
Start: 2020-01-01 | End: 2020-01-01

## 2020-01-01 RX ORDER — MAGNESIUM SULFATE HEPTAHYDRATE 40 MG/ML
2 INJECTION, SOLUTION INTRAVENOUS ONCE
Status: COMPLETED | OUTPATIENT
Start: 2020-01-01 | End: 2020-01-01

## 2020-01-01 RX ORDER — POTASSIUM CHLORIDE 750 MG/1
10 TABLET, FILM COATED, EXTENDED RELEASE ORAL DAILY
COMMUNITY
End: 2020-01-01

## 2020-01-01 RX ORDER — ACETAMINOPHEN 500 MG/1
TABLET, FILM COATED ORAL
Qty: 90 TABLET | Refills: 5 | Status: SHIPPED | OUTPATIENT
Start: 2020-01-01 | End: 2020-01-01

## 2020-01-01 RX ORDER — AMLODIPINE BESYLATE 5 MG/1
TABLET ORAL
Qty: 90 TABLET | Refills: 3 | Status: SHIPPED | OUTPATIENT
Start: 2020-01-01 | End: 2021-01-01 | Stop reason: SDUPTHER

## 2020-01-01 RX ORDER — ACETAMINOPHEN 500 MG
500 TABLET ORAL EVERY 6 HOURS PRN
Status: DISCONTINUED | OUTPATIENT
Start: 2020-01-01 | End: 2020-01-01 | Stop reason: HOSPADM

## 2020-01-01 RX ORDER — IPRATROPIUM BROMIDE AND ALBUTEROL SULFATE 2.5; .5 MG/3ML; MG/3ML
3 SOLUTION RESPIRATORY (INHALATION) EVERY 4 HOURS PRN
Qty: 360 ML | Refills: 11 | Status: SHIPPED | OUTPATIENT
Start: 2020-01-01 | End: 2020-01-01 | Stop reason: SDUPTHER

## 2020-01-01 RX ORDER — CYCLOBENZAPRINE HCL 10 MG
5 TABLET ORAL 3 TIMES DAILY PRN
Status: DISCONTINUED | OUTPATIENT
Start: 2020-01-01 | End: 2020-01-01 | Stop reason: HOSPADM

## 2020-01-01 RX ORDER — ACETAMINOPHEN 500 MG
500 TABLET ORAL EVERY 6 HOURS PRN
Qty: 45 TABLET | Refills: 1 | Status: SHIPPED | OUTPATIENT
Start: 2020-01-01 | End: 2021-01-01

## 2020-01-01 RX ORDER — CEFTRIAXONE 1 G/50ML
1 INJECTION, SOLUTION INTRAVENOUS EVERY 24 HOURS
Status: DISCONTINUED | OUTPATIENT
Start: 2020-01-01 | End: 2020-01-01 | Stop reason: HOSPADM

## 2020-01-01 RX ORDER — PREDNISONE 10 MG/1
10 TABLET ORAL DAILY
Qty: 30 TABLET | Refills: 1 | Status: SHIPPED | OUTPATIENT
Start: 2020-01-01 | End: 2020-01-01

## 2020-01-01 RX ORDER — FOLIC ACID/VIT B COMPLEX AND C 0.8 MG
1 TABLET ORAL DAILY
Status: DISCONTINUED | OUTPATIENT
Start: 2020-01-01 | End: 2020-01-01 | Stop reason: HOSPADM

## 2020-01-01 RX ORDER — KIT FOR THE PREPARATION OF TECHNETIUM TC 99M MEBROFENIN 45 MG/10ML
1 INJECTION, POWDER, LYOPHILIZED, FOR SOLUTION INTRAVENOUS
Status: COMPLETED | OUTPATIENT
Start: 2020-01-01 | End: 2020-01-01

## 2020-01-01 RX ORDER — FINASTERIDE 5 MG/1
5 TABLET, FILM COATED ORAL DAILY
COMMUNITY
End: 2021-01-01

## 2020-01-01 RX ORDER — TAMSULOSIN HYDROCHLORIDE 0.4 MG/1
0.4 CAPSULE ORAL NIGHTLY
Status: DISCONTINUED | OUTPATIENT
Start: 2020-01-01 | End: 2020-01-01 | Stop reason: HOSPADM

## 2020-01-01 RX ORDER — AMLODIPINE BESYLATE 5 MG/1
5 TABLET ORAL DAILY
Status: DISCONTINUED | OUTPATIENT
Start: 2020-01-01 | End: 2020-01-01 | Stop reason: HOSPADM

## 2020-01-01 RX ORDER — PREDNISONE 10 MG/1
TABLET ORAL
Qty: 28 TABLET | Refills: 5 | Status: SHIPPED | OUTPATIENT
Start: 2020-01-01 | End: 2020-01-01

## 2020-01-01 RX ORDER — IPRATROPIUM BROMIDE AND ALBUTEROL SULFATE 2.5; .5 MG/3ML; MG/3ML
3 SOLUTION RESPIRATORY (INHALATION) ONCE
Status: COMPLETED | OUTPATIENT
Start: 2020-01-01 | End: 2020-01-01

## 2020-01-01 RX ORDER — DOCUSATE SODIUM 100 MG/1
100 CAPSULE, LIQUID FILLED ORAL 2 TIMES DAILY PRN
Status: DISCONTINUED | OUTPATIENT
Start: 2020-01-01 | End: 2020-01-01 | Stop reason: HOSPADM

## 2020-01-01 RX ORDER — ESCITALOPRAM OXALATE 20 MG/1
20 TABLET ORAL DAILY
COMMUNITY
End: 2021-01-01

## 2020-01-01 RX ORDER — CHOLECALCIFEROL (VITAMIN D3) 125 MCG
5 CAPSULE ORAL NIGHTLY
Status: DISCONTINUED | OUTPATIENT
Start: 2020-01-01 | End: 2020-01-01 | Stop reason: HOSPADM

## 2020-01-01 RX ORDER — PANTOPRAZOLE SODIUM 40 MG/1
TABLET, DELAYED RELEASE ORAL
Qty: 90 TABLET | Refills: 3 | Status: SHIPPED | OUTPATIENT
Start: 2020-01-01

## 2020-01-01 RX ORDER — SODIUM CHLORIDE 9 MG/ML
70 INJECTION, SOLUTION INTRAVENOUS CONTINUOUS PRN
Status: DISCONTINUED | OUTPATIENT
Start: 2020-01-01 | End: 2020-01-01 | Stop reason: HOSPADM

## 2020-01-01 RX ORDER — BUDESONIDE AND FORMOTEROL FUMARATE DIHYDRATE 160; 4.5 UG/1; UG/1
2 AEROSOL RESPIRATORY (INHALATION)
Status: DISCONTINUED | OUTPATIENT
Start: 2020-01-01 | End: 2020-01-01 | Stop reason: HOSPADM

## 2020-01-01 RX ORDER — BENZONATATE 200 MG/1
CAPSULE ORAL
Qty: 45 CAPSULE | Refills: 0 | Status: SHIPPED | OUTPATIENT
Start: 2020-01-01 | End: 2021-01-01

## 2020-01-01 RX ORDER — CEPHALEXIN 500 MG/1
500 CAPSULE ORAL 3 TIMES DAILY
Qty: 21 CAPSULE | Refills: 0 | Status: SHIPPED | OUTPATIENT
Start: 2020-01-01 | End: 2020-01-01

## 2020-01-01 RX ORDER — PREDNISONE 10 MG/1
TABLET ORAL
Qty: 100 TABLET | Refills: 0 | Status: SHIPPED | OUTPATIENT
Start: 2020-01-01 | End: 2021-01-01

## 2020-01-01 RX ORDER — PROMETHAZINE HYDROCHLORIDE 25 MG/1
25 TABLET ORAL AS NEEDED
Status: ON HOLD | COMMUNITY
End: 2020-01-01

## 2020-01-01 RX ORDER — HYDROCODONE BITARTRATE AND ACETAMINOPHEN 5; 325 MG/1; MG/1
1 TABLET ORAL EVERY 6 HOURS PRN
Qty: 30 TABLET | Refills: 0 | Status: SHIPPED | OUTPATIENT
Start: 2020-01-01 | End: 2020-01-01 | Stop reason: SDUPTHER

## 2020-01-01 RX ORDER — IPRATROPIUM BROMIDE AND ALBUTEROL SULFATE 2.5; .5 MG/3ML; MG/3ML
3 SOLUTION RESPIRATORY (INHALATION)
Status: DISCONTINUED | OUTPATIENT
Start: 2020-01-01 | End: 2020-01-01 | Stop reason: HOSPADM

## 2020-01-01 RX ORDER — MIRTAZAPINE 15 MG/1
TABLET, FILM COATED ORAL
Qty: 90 TABLET | Refills: 3 | Status: SHIPPED | OUTPATIENT
Start: 2020-01-01

## 2020-01-01 RX ORDER — CEFDINIR 300 MG/1
300 CAPSULE ORAL EVERY 12 HOURS SCHEDULED
Status: DISCONTINUED | OUTPATIENT
Start: 2020-01-01 | End: 2020-01-01

## 2020-01-01 RX ORDER — ALFUZOSIN HYDROCHLORIDE 10 MG/1
TABLET, EXTENDED RELEASE ORAL
Qty: 90 TABLET | Refills: 3 | Status: SHIPPED | OUTPATIENT
Start: 2020-01-01

## 2020-01-01 RX ORDER — IPRATROPIUM BROMIDE AND ALBUTEROL SULFATE 2.5; .5 MG/3ML; MG/3ML
SOLUTION RESPIRATORY (INHALATION)
Status: COMPLETED
Start: 2020-01-01 | End: 2020-01-01

## 2020-01-01 RX ORDER — PNV NO.95/FERROUS FUM/FOLIC AC 28MG-0.8MG
TABLET ORAL
Qty: 28 TABLET | Refills: 5 | Status: SHIPPED | OUTPATIENT
Start: 2020-01-01 | End: 2021-01-01

## 2020-01-01 RX ORDER — ACETAMINOPHEN 500 MG
500 TABLET ORAL ONCE
Status: COMPLETED | OUTPATIENT
Start: 2020-01-01 | End: 2020-01-01

## 2020-01-01 RX ORDER — HYDROCODONE BITARTRATE AND ACETAMINOPHEN 5; 325 MG/1; MG/1
1 TABLET ORAL EVERY 6 HOURS PRN
Qty: 10 TABLET | Refills: 0 | Status: SHIPPED | OUTPATIENT
Start: 2020-01-01 | End: 2020-01-01 | Stop reason: SDUPTHER

## 2020-01-01 RX ORDER — CEPHALEXIN 500 MG/1
500 CAPSULE ORAL 3 TIMES DAILY
Qty: 21 CAPSULE | Refills: 0 | Status: SHIPPED | OUTPATIENT
Start: 2020-01-01 | End: 2021-01-01

## 2020-01-01 RX ORDER — POLYETHYLENE GLYCOL 3350 17 G/17G
17 POWDER, FOR SOLUTION ORAL DAILY
Status: DISCONTINUED | OUTPATIENT
Start: 2020-01-01 | End: 2020-01-01 | Stop reason: HOSPADM

## 2020-01-01 RX ORDER — PROPOFOL 10 MG/ML
INJECTION, EMULSION INTRAVENOUS AS NEEDED
Status: DISCONTINUED | OUTPATIENT
Start: 2020-01-01 | End: 2020-01-01 | Stop reason: SURG

## 2020-01-01 RX ORDER — CLOPIDOGREL BISULFATE 75 MG/1
75 TABLET ORAL DAILY
Status: DISCONTINUED | OUTPATIENT
Start: 2020-01-01 | End: 2020-01-01 | Stop reason: HOSPADM

## 2020-01-01 RX ORDER — FENTANYL CITRATE 50 UG/ML
50 INJECTION, SOLUTION INTRAMUSCULAR; INTRAVENOUS ONCE
Status: COMPLETED | OUTPATIENT
Start: 2020-01-01 | End: 2020-01-01

## 2020-01-01 RX ORDER — CARBOXYMETHYLCELLULOSE SODIUM 5 MG/ML
1 SOLUTION/ DROPS OPHTHALMIC 3 TIMES DAILY PRN
COMMUNITY

## 2020-01-01 RX ORDER — CEFDINIR 300 MG/1
300 CAPSULE ORAL EVERY 12 HOURS SCHEDULED
Status: DISCONTINUED | OUTPATIENT
Start: 2020-01-01 | End: 2020-01-01 | Stop reason: HOSPADM

## 2020-01-01 RX ORDER — ALPRAZOLAM 0.25 MG/1
0.25 TABLET ORAL 2 TIMES DAILY PRN
Qty: 30 TABLET | Refills: 1 | Status: SHIPPED | OUTPATIENT
Start: 2020-01-01 | End: 2021-01-01

## 2020-01-01 RX ORDER — HYDROCODONE BITARTRATE AND ACETAMINOPHEN 5; 325 MG/1; MG/1
1 TABLET ORAL EVERY 8 HOURS PRN
Status: DISCONTINUED | OUTPATIENT
Start: 2020-01-01 | End: 2020-01-01 | Stop reason: HOSPADM

## 2020-01-01 RX ORDER — BENZONATATE 200 MG/1
200 CAPSULE ORAL 3 TIMES DAILY PRN
Qty: 45 CAPSULE | Refills: 0 | OUTPATIENT
Start: 2020-01-01

## 2020-01-01 RX ADMIN — IPRATROPIUM BROMIDE 0.5 MG: 0.5 SOLUTION RESPIRATORY (INHALATION) at 07:04

## 2020-01-01 RX ADMIN — ATORVASTATIN CALCIUM 10 MG: 10 TABLET, FILM COATED ORAL at 18:56

## 2020-01-01 RX ADMIN — Medication 5 MG: at 20:36

## 2020-01-01 RX ADMIN — CEFTRIAXONE 1 G: 1 INJECTION, SOLUTION INTRAVENOUS at 20:47

## 2020-01-01 RX ADMIN — AMLODIPINE BESYLATE 5 MG: 5 TABLET ORAL at 09:09

## 2020-01-01 RX ADMIN — TRAZODONE HYDROCHLORIDE 50 MG: 50 TABLET ORAL at 20:57

## 2020-01-01 RX ADMIN — FERROUS SULFATE TAB EC 324 MG (65 MG FE EQUIVALENT) 324 MG: 324 (65 FE) TABLET DELAYED RESPONSE at 09:16

## 2020-01-01 RX ADMIN — TAMSULOSIN HYDROCHLORIDE 0.4 MG: 0.4 CAPSULE ORAL at 20:57

## 2020-01-01 RX ADMIN — FUROSEMIDE 20 MG: 20 TABLET ORAL at 09:08

## 2020-01-01 RX ADMIN — SODIUM CHLORIDE, PRESERVATIVE FREE 10 ML: 5 INJECTION INTRAVENOUS at 20:58

## 2020-01-01 RX ADMIN — FINASTERIDE 5 MG: 5 TABLET, FILM COATED ORAL at 08:50

## 2020-01-01 RX ADMIN — ASPIRIN 81 MG CHEWABLE TABLET 81 MG: 81 TABLET CHEWABLE at 09:08

## 2020-01-01 RX ADMIN — IPRATROPIUM BROMIDE AND ALBUTEROL SULFATE 3 ML: .5; 3 SOLUTION RESPIRATORY (INHALATION) at 13:18

## 2020-01-01 RX ADMIN — SODIUM CHLORIDE 250 ML: 9 INJECTION, SOLUTION INTRAVENOUS at 01:37

## 2020-01-01 RX ADMIN — ACETAMINOPHEN 500 MG: 500 TABLET, FILM COATED ORAL at 12:54

## 2020-01-01 RX ADMIN — METHYLPREDNISOLONE SODIUM SUCCINATE 125 MG: 125 INJECTION, POWDER, FOR SOLUTION INTRAMUSCULAR; INTRAVENOUS at 20:12

## 2020-01-01 RX ADMIN — AMLODIPINE BESYLATE 5 MG: 5 TABLET ORAL at 09:16

## 2020-01-01 RX ADMIN — BUDESONIDE AND FORMOTEROL FUMARATE DIHYDRATE 2 PUFF: 160; 4.5 AEROSOL RESPIRATORY (INHALATION) at 07:01

## 2020-01-01 RX ADMIN — SODIUM CHLORIDE, PRESERVATIVE FREE 10 ML: 5 INJECTION INTRAVENOUS at 21:12

## 2020-01-01 RX ADMIN — PANTOPRAZOLE SODIUM 40 MG: 40 TABLET, DELAYED RELEASE ORAL at 06:24

## 2020-01-01 RX ADMIN — Medication 1 TABLET: at 09:16

## 2020-01-01 RX ADMIN — LIDOCAINE HYDROCHLORIDE 20 MG: 20 INJECTION, SOLUTION INTRAVENOUS at 12:16

## 2020-01-01 RX ADMIN — ENOXAPARIN SODIUM 40 MG: 40 INJECTION SUBCUTANEOUS at 06:27

## 2020-01-01 RX ADMIN — ENOXAPARIN SODIUM 40 MG: 40 INJECTION SUBCUTANEOUS at 06:31

## 2020-01-01 RX ADMIN — CEFTRIAXONE 1 G: 1 INJECTION, SOLUTION INTRAVENOUS at 20:57

## 2020-01-01 RX ADMIN — HYDROCODONE BITARTRATE AND ACETAMINOPHEN 1 TABLET: 5; 325 TABLET ORAL at 01:37

## 2020-01-01 RX ADMIN — FINASTERIDE 5 MG: 5 TABLET, FILM COATED ORAL at 09:16

## 2020-01-01 RX ADMIN — ATORVASTATIN CALCIUM 10 MG: 10 TABLET, FILM COATED ORAL at 18:55

## 2020-01-01 RX ADMIN — BUDESONIDE AND FORMOTEROL FUMARATE DIHYDRATE 2 PUFF: 160; 4.5 AEROSOL RESPIRATORY (INHALATION) at 19:01

## 2020-01-01 RX ADMIN — CLOPIDOGREL BISULFATE 75 MG: 75 TABLET ORAL at 08:50

## 2020-01-01 RX ADMIN — MIRTAZAPINE 15 MG: 15 TABLET, FILM COATED ORAL at 20:57

## 2020-01-01 RX ADMIN — CLOPIDOGREL BISULFATE 75 MG: 75 TABLET ORAL at 09:09

## 2020-01-01 RX ADMIN — IPRATROPIUM BROMIDE 0.5 MG: 0.5 SOLUTION RESPIRATORY (INHALATION) at 00:35

## 2020-01-01 RX ADMIN — BUDESONIDE AND FORMOTEROL FUMARATE DIHYDRATE 2 PUFF: 160; 4.5 AEROSOL RESPIRATORY (INHALATION) at 19:44

## 2020-01-01 RX ADMIN — FUROSEMIDE 20 MG: 20 TABLET ORAL at 08:50

## 2020-01-01 RX ADMIN — IPRATROPIUM BROMIDE 0.5 MG: 0.5 SOLUTION RESPIRATORY (INHALATION) at 18:56

## 2020-01-01 RX ADMIN — Medication 5 MG: at 01:37

## 2020-01-01 RX ADMIN — ESCITALOPRAM OXALATE 20 MG: 10 TABLET ORAL at 08:50

## 2020-01-01 RX ADMIN — IPRATROPIUM BROMIDE 0.5 MG: 0.5 SOLUTION RESPIRATORY (INHALATION) at 06:53

## 2020-01-01 RX ADMIN — IPRATROPIUM BROMIDE AND ALBUTEROL SULFATE 3 ML: .5; 3 SOLUTION RESPIRATORY (INHALATION) at 21:08

## 2020-01-01 RX ADMIN — IPRATROPIUM BROMIDE AND ALBUTEROL SULFATE 3 ML: .5; 3 SOLUTION RESPIRATORY (INHALATION) at 17:50

## 2020-01-01 RX ADMIN — ALBUTEROL SULFATE 6 PUFF: 90 AEROSOL, METERED RESPIRATORY (INHALATION) at 15:42

## 2020-01-01 RX ADMIN — PROPOFOL 140 MG: 10 INJECTION, EMULSION INTRAVENOUS at 12:18

## 2020-01-01 RX ADMIN — PANTOPRAZOLE SODIUM 40 MG: 40 TABLET, DELAYED RELEASE ORAL at 06:28

## 2020-01-01 RX ADMIN — SODIUM CHLORIDE, PRESERVATIVE FREE 10 ML: 5 INJECTION INTRAVENOUS at 01:37

## 2020-01-01 RX ADMIN — SODIUM CHLORIDE: 9 INJECTION, SOLUTION INTRAVENOUS at 12:08

## 2020-01-01 RX ADMIN — AMLODIPINE BESYLATE 5 MG: 5 TABLET ORAL at 08:50

## 2020-01-01 RX ADMIN — IPRATROPIUM BROMIDE 0.5 MG: 0.5 SOLUTION RESPIRATORY (INHALATION) at 19:43

## 2020-01-01 RX ADMIN — Medication 5 MG: at 20:57

## 2020-01-01 RX ADMIN — FUROSEMIDE 20 MG: 20 TABLET ORAL at 09:17

## 2020-01-01 RX ADMIN — CEFDINIR 300 MG: 300 CAPSULE ORAL at 15:38

## 2020-01-01 RX ADMIN — ACETAMINOPHEN 500 MG: 500 TABLET, FILM COATED ORAL at 20:20

## 2020-01-01 RX ADMIN — ACETAMINOPHEN 500 MG: 500 TABLET, FILM COATED ORAL at 15:22

## 2020-01-01 RX ADMIN — SODIUM CHLORIDE, PRESERVATIVE FREE 10 ML: 5 INJECTION INTRAVENOUS at 08:52

## 2020-01-01 RX ADMIN — TAMSULOSIN HYDROCHLORIDE 0.4 MG: 0.4 CAPSULE ORAL at 20:36

## 2020-01-01 RX ADMIN — HYDROCODONE BITARTRATE AND ACETAMINOPHEN 1 TABLET: 5; 325 TABLET ORAL at 13:31

## 2020-01-01 RX ADMIN — ENOXAPARIN SODIUM 40 MG: 40 INJECTION SUBCUTANEOUS at 06:23

## 2020-01-01 RX ADMIN — TRAZODONE HYDROCHLORIDE 50 MG: 50 TABLET ORAL at 20:36

## 2020-01-01 RX ADMIN — MEBROFENIN 1 DOSE: 45 INJECTION, POWDER, LYOPHILIZED, FOR SOLUTION INTRAVENOUS at 10:00

## 2020-01-01 RX ADMIN — PROPOFOL 50 MG: 10 INJECTION, EMULSION INTRAVENOUS at 12:16

## 2020-01-01 RX ADMIN — ESCITALOPRAM OXALATE 20 MG: 10 TABLET ORAL at 09:08

## 2020-01-01 RX ADMIN — TAMSULOSIN HYDROCHLORIDE 0.4 MG: 0.4 CAPSULE ORAL at 01:37

## 2020-01-01 RX ADMIN — CLOPIDOGREL BISULFATE 75 MG: 75 TABLET ORAL at 09:17

## 2020-01-01 RX ADMIN — IPRATROPIUM BROMIDE 0.5 MG: 0.5 SOLUTION RESPIRATORY (INHALATION) at 12:37

## 2020-01-01 RX ADMIN — MIRTAZAPINE 15 MG: 15 TABLET, FILM COATED ORAL at 01:37

## 2020-01-01 RX ADMIN — IPRATROPIUM BROMIDE 0.5 MG: 0.5 SOLUTION RESPIRATORY (INHALATION) at 13:33

## 2020-01-01 RX ADMIN — IPRATROPIUM BROMIDE 0.5 MG: 0.5 SOLUTION RESPIRATORY (INHALATION) at 07:01

## 2020-01-01 RX ADMIN — METHYLPREDNISOLONE SODIUM SUCCINATE 125 MG: 125 INJECTION, POWDER, FOR SOLUTION INTRAMUSCULAR; INTRAVENOUS at 15:42

## 2020-01-01 RX ADMIN — FENTANYL CITRATE 50 MCG: 50 INJECTION, SOLUTION INTRAMUSCULAR; INTRAVENOUS at 10:52

## 2020-01-01 RX ADMIN — SODIUM CHLORIDE, PRESERVATIVE FREE 10 ML: 5 INJECTION INTRAVENOUS at 09:17

## 2020-01-01 RX ADMIN — CEFTRIAXONE 1 G: 1 INJECTION, SOLUTION INTRAVENOUS at 20:38

## 2020-01-01 RX ADMIN — ASPIRIN 81 MG CHEWABLE TABLET 81 MG: 81 TABLET CHEWABLE at 09:16

## 2020-01-01 RX ADMIN — MAGNESIUM SULFATE IN WATER 2 G: 40 INJECTION, SOLUTION INTRAVENOUS at 20:09

## 2020-01-01 RX ADMIN — SODIUM CHLORIDE, PRESERVATIVE FREE 10 ML: 5 INJECTION INTRAVENOUS at 09:09

## 2020-01-01 RX ADMIN — TRAZODONE HYDROCHLORIDE 50 MG: 50 TABLET ORAL at 01:37

## 2020-01-01 RX ADMIN — ESCITALOPRAM OXALATE 20 MG: 10 TABLET ORAL at 09:16

## 2020-01-01 RX ADMIN — MIRTAZAPINE 15 MG: 15 TABLET, FILM COATED ORAL at 20:36

## 2020-01-01 RX ADMIN — BUDESONIDE AND FORMOTEROL FUMARATE DIHYDRATE 2 PUFF: 160; 4.5 AEROSOL RESPIRATORY (INHALATION) at 07:04

## 2020-01-01 RX ADMIN — FERROUS SULFATE TAB EC 324 MG (65 MG FE EQUIVALENT) 324 MG: 324 (65 FE) TABLET DELAYED RESPONSE at 09:08

## 2020-01-01 RX ADMIN — FINASTERIDE 5 MG: 5 TABLET, FILM COATED ORAL at 09:09

## 2020-01-01 RX ADMIN — HYDROCODONE BITARTRATE AND ACETAMINOPHEN 1 TABLET: 5; 325 TABLET ORAL at 20:36

## 2020-01-01 RX ADMIN — POLYETHYLENE GLYCOL 3350 17 G: 17 POWDER, FOR SOLUTION ORAL at 09:09

## 2020-01-02 ENCOUNTER — READMISSION MANAGEMENT (OUTPATIENT)
Dept: CALL CENTER | Facility: HOSPITAL | Age: 70
End: 2020-01-02

## 2020-01-02 NOTE — OUTREACH NOTE
General Surgery Week 1 Survey      Responses   Facility patient discharged from?  [facility]   Does the patient have one of the following disease processes/diagnoses(primary or secondary)?  General Surgery   Is there a successful TCM telephone encounter documented?  Yes          Agustina Rodríguez RN

## 2020-01-03 ENCOUNTER — OFFICE VISIT (OUTPATIENT)
Dept: INTERNAL MEDICINE | Facility: CLINIC | Age: 70
End: 2020-01-03

## 2020-01-03 ENCOUNTER — HOSPITAL ENCOUNTER (OUTPATIENT)
Dept: GENERAL RADIOLOGY | Facility: HOSPITAL | Age: 70
Discharge: HOME | End: 2020-01-03
Admitting: NURSE PRACTITIONER

## 2020-01-03 VITALS
SYSTOLIC BLOOD PRESSURE: 100 MMHG | DIASTOLIC BLOOD PRESSURE: 66 MMHG | HEART RATE: 97 BPM | HEIGHT: 68 IN | WEIGHT: 165 LBS | TEMPERATURE: 97.8 F | BODY MASS INDEX: 25.01 KG/M2 | OXYGEN SATURATION: 95 %

## 2020-01-03 DIAGNOSIS — I48.0 PAROXYSMAL ATRIAL FIBRILLATION (HCC): ICD-10-CM
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
## 2020-01-03 DIAGNOSIS — J96.21 ACUTE ON CHRONIC RESPIRATORY FAILURE WITH HYPOXIA AND HYPERCAPNIA (HCC): ICD-10-CM

## 2020-01-03 DIAGNOSIS — J96.22 ACUTE ON CHRONIC RESPIRATORY FAILURE WITH HYPOXIA AND HYPERCAPNIA (HCC): ICD-10-CM
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
## 2020-01-03 DIAGNOSIS — I25.119 CORONARY ARTERY DISEASE INVOLVING NATIVE CORONARY ARTERY OF NATIVE HEART WITH ANGINA PECTORIS (HCC): ICD-10-CM

## 2020-01-03 DIAGNOSIS — I50.32 CHRONIC DIASTOLIC CONGESTIVE HEART FAILURE (HCC): ICD-10-CM

## 2020-01-03 DIAGNOSIS — M25.512 ACUTE PAIN OF LEFT SHOULDER: ICD-10-CM

## 2020-01-03 DIAGNOSIS — Z09 HOSPITAL DISCHARGE FOLLOW-UP: Primary | ICD-10-CM

## 2020-01-03 PROCEDURE — 99496 TRANSJ CARE MGMT HIGH F2F 7D: CPT | Performed by: NURSE PRACTITIONER

## 2020-01-03 PROCEDURE — 73030 X-RAY EXAM OF SHOULDER: CPT

## 2020-01-03 NOTE — PROGRESS NOTES
Transitional Care Follow Up Visit  Subjective     Rexfordnaty Pena is a 69 y.o. male who presents for a transitional care management visit.    Within 48 business hours after discharge our office contacted him via telephone to coordinate his care and needs.      I reviewed and discussed the details of that call along with the discharge summary, hospital problems, inpatient lab results, inpatient diagnostic studies, and consultation reports with Jani.     Current outpatient and discharge medications have been reconciled for the patient.  Reviewed by: PAL Mcghee      Date of TCM Phone Call 12/31/2019   [facility]   Date of Admission -   Date of Discharge 12/28/2019   Discharge Disposition Home or Self Care     Risk for Readmission (LACE) Score: 15 (12/28/2019  6:00 AM)      History of Present Illness   Course During Hospital Stay:  Patient was admitted to [facility] on 12/26/2019, after being seen in the emergency room with complaints of 7 bouts of diarrhea since that morning.  He had been been feeling increasingly weak throughout the day, noticed that his blood pressure had dropped.  He was just completing antibiotics prescribed for pneumonia.  He had noted that he had black tarry stool over several days preceding emergency room visit.  He was on dual antiplatelet therapy with aspirin and Plavix, last colonoscopy approximately 5 years ago.  In the ED, creatinine was noted to be above baseline, BUN 61.  Fecal occult blood test was positive, CT abdomen pelvis was unremarkable.  While admitted he had an EGD as well as colonoscopy.  A 7 mm polyp was resected during colonoscopy, no other signs of bleeding in colon.  EGD showed some gastritis.  He was prescribed Protonix, which is to be continued after discharge.  Aspirin is to be discontinued, Plavix continued due to history of CAD, paroxysmal A. fib.  Renal function improved prior to discharge.  He was discharged to [facility]  rehab facility on 12/28/2019, with instructions to follow-up with PCP.  Prior to admission to emergency room, patient reports he fell on left shoulder.  He did not report this to hospital personnel.  Initially, shoulder was not painful, has begun to hurt in the past 2 days.  He has noted he is unable to lift left arm over his head without significant discomfort.  Denies weakness of left arm/shoulder, radiation of pain or numbness/tingling/burning into left arm.      The following portions of the patient's history were reviewed and updated as appropriate: allergies, current medications, past family history, past medical history, past social history, past surgical history and problem list.    Review of Systems   Constitutional: Positive for fatigue. Negative for activity change, appetite change, chills, diaphoresis and fever.   HENT: Negative.    Respiratory: Positive for cough. Negative for shortness of breath and wheezing.    Cardiovascular: Negative for chest pain, palpitations and leg swelling.   Gastrointestinal: Negative.    Genitourinary: Negative.    Musculoskeletal: Negative for myalgias.   Skin: Negative for pallor and rash.   Neurological: Negative.    Hematological: Does not bruise/bleed easily.       Objective   Physical Exam   Constitutional: He is oriented to person, place, and time. He appears well-developed and well-nourished. He has a sickly appearance.   HENT:   Head: Normocephalic.   Right Ear: Tympanic membrane, external ear and ear canal normal.   Left Ear: Tympanic membrane and external ear normal.   Nose: Nose normal.   Mouth/Throat: Uvula is midline and mucous membranes are normal. Posterior oropharyngeal erythema present.   Eyes: Pupils are equal, round, and reactive to light. Conjunctivae and EOM are normal.   Neck: Normal range of motion. Neck supple.   Cardiovascular: Normal rate, regular rhythm, normal heart sounds and intact distal pulses.   Pulmonary/Chest: Effort normal. He has  wheezes. He has no rales.   On 3L O2 per nasal cannula   Abdominal: Soft.   Musculoskeletal:   Using rollator walker for ambulation.  Left shoulder ROM decreased due to pain with movement overhead, with Apley scratch test.  No tenderness to palpation of left shoulder, equal /pushes/pulls bilateral arms/hands   Lymphadenopathy:     He has no cervical adenopathy.   Neurological: He is alert and oriented to person, place, and time.   CN II-XII normal   Skin: Skin is warm. Capillary refill takes less than 2 seconds.   Psychiatric: He has a normal mood and affect. His behavior is normal.       Assessment/Plan   Jani was seen today for follow-up.    Diagnoses and all orders for this visit:    Hospital discharge follow-up    Fall at home, subsequent encounter  -     XR Shoulder 2+ View Left  Acute left shoulder pain  Paroxysmal atrial fibrillation (CMS/HCC)    Chronic diastolic congestive heart failure (CMS/HCC)    Coronary artery disease involving native coronary artery of native heart with angina pectoris (CMS/HCC)    Acute on chronic respiratory failure with hypoxia and hypercapnia (CMS/HCC)    Continue medications as prescribed, including Plavix, Protonix.  Return to clinic if weakness, palpitations, shortness of breath, black tarry stools develop, signs and symptoms of MI/CVA develop.  Patient verbalizes what  those signs and symptoms are.

## 2020-01-04 LAB
LAB AP CASE REPORT: NORMAL
PATH REPORT.FINAL DX SPEC: NORMAL

## 2020-01-06 ENCOUNTER — TELEPHONE (OUTPATIENT)
Dept: INTERNAL MEDICINE | Facility: CLINIC | Age: 70
End: 2020-01-06

## 2020-01-06 NOTE — TELEPHONE ENCOUNTER
"Received a call from Marilia at [facility] stating:    Yan Ochoa I'm calling in regards to Jani Haynes's(?) patient. His YOB: 1950 and I needed to get orders for a chest x-ray. He does have a little bit of blood when he's coughing and he is coughing up yellow green. So I was wondering if we could get a chest x-ray to rule out pneumonia and some things. If you could please give me a call back the phone no. is [phone] that's [phone]. Again my name is Marilia. Thank you very much. Uzma graves.\"    "

## 2020-01-06 NOTE — TELEPHONE ENCOUNTER
Called Shayy back from [facility] to see what was going on with Jani, Shayy began to state that Jani had been coughing up blood and that it is also in his stool. Tried giving Jani a call multiple times but can't reach him. Shayy the nurse from [facility] tried to get Jani to go on to the ER but he stated he wanted your ok first before he went on to the hospital. Please advise.

## 2020-01-06 NOTE — TELEPHONE ENCOUNTER
Home health states that patient possibly has a bleed. He is coughing up blood and it is also in his stool. She advised him that he needs to go to the hospital and he wants the ok from Dr. Rojas before he goes. She states she has left a voicemail on this also, I advised that we are in Clinic.

## 2020-01-07 ENCOUNTER — TELEPHONE (OUTPATIENT)
Dept: INTERNAL MEDICINE | Facility: CLINIC | Age: 70
End: 2020-01-07

## 2020-01-07 ENCOUNTER — READMISSION MANAGEMENT (OUTPATIENT)
Dept: CALL CENTER | Facility: HOSPITAL | Age: 70
End: 2020-01-07

## 2020-01-07 ENCOUNTER — OFFICE VISIT (OUTPATIENT)
Dept: INTERNAL MEDICINE | Facility: CLINIC | Age: 70
End: 2020-01-07

## 2020-01-07 VITALS
TEMPERATURE: 98 F | DIASTOLIC BLOOD PRESSURE: 56 MMHG | OXYGEN SATURATION: 83 % | RESPIRATION RATE: 16 BRPM | HEIGHT: 68 IN | BODY MASS INDEX: 25.09 KG/M2 | HEART RATE: 87 BPM | SYSTOLIC BLOOD PRESSURE: 110 MMHG

## 2020-01-07 DIAGNOSIS — J40 BRONCHITIS: Primary | ICD-10-CM

## 2020-01-07 DIAGNOSIS — J32.9 SINUSITIS, UNSPECIFIED CHRONICITY, UNSPECIFIED LOCATION: ICD-10-CM

## 2020-01-07 DIAGNOSIS — J44.1 COPD WITH EXACERBATION (HCC): ICD-10-CM

## 2020-01-07 PROCEDURE — 99214 OFFICE O/P EST MOD 30 MIN: CPT | Performed by: INTERNAL MEDICINE

## 2020-01-07 RX ORDER — CETIRIZINE HYDROCHLORIDE 10 MG/1
10 TABLET ORAL DAILY
Qty: 30 TABLET | Refills: 0 | Status: SHIPPED | OUTPATIENT
Start: 2020-01-07 | End: 2020-01-08 | Stop reason: SDUPTHER

## 2020-01-07 RX ORDER — GUAIFENESIN AND DEXTROMETHORPHAN HYDROBROMIDE 600; 30 MG/1; MG/1
1 TABLET, EXTENDED RELEASE ORAL 2 TIMES DAILY
Qty: 20 TABLET | Refills: 0 | Status: SHIPPED | OUTPATIENT
Start: 2020-01-07 | End: 2020-01-08 | Stop reason: SDUPTHER

## 2020-01-07 RX ORDER — LEVOFLOXACIN 500 MG/1
500 TABLET, FILM COATED ORAL DAILY
Qty: 7 TABLET | Refills: 0 | Status: SHIPPED | OUTPATIENT
Start: 2020-01-07 | End: 2020-01-08 | Stop reason: SDUPTHER

## 2020-01-07 NOTE — PROGRESS NOTES
Subjective   Jani Pena is a 69 y.o. male.     Chief Complaint   Patient presents with   • Follow-up     pt c/o blood pressure dropping 90/46 yesterday, pt states this morning it has went up.    • Cough     pt states he has been coughing up blood, c/o blood coming out of his nostrils. pt states today this isn't any better        History of Present Illness   Nursing home reported patient had blood in stool and a cough of blood.  Patient denies any blood in stool but patient does have hematemesis with nosebleeding also.  Patient complains of cough with yellow-green sputum again.  Patient has end-stage lung disease with COPD.  Oxygen today 91% on 4 L oxygen.  Patient does have some sinus congestion runny nose.    Current Outpatient Medications:   •  ALPRAZolam (XANAX) 0.5 MG tablet, Take 1 tablet by mouth At Night As Needed for Anxiety., Disp: 30 tablet, Rfl: 1  •  amLODIPine (NORVASC) 5 MG tablet, Take 1 tablet by mouth Daily., Disp: , Rfl:   •  atorvastatin (LIPITOR) 10 MG tablet, Take 1 tablet by mouth Every Night., Disp: , Rfl:   •  Calcium Carbonate-Vitamin D3 (CALCIUM 600-D) 600-400 MG-UNIT tablet, Take 1 tablet by mouth 2 (Two) Times a Day., Disp: , Rfl:   •  carboxymethylcellulose (REFRESH PLUS) 0.5 % solution, Administer 1 drop to both eyes 3 (Three) Times a Day As Needed for Dry Eyes., Disp: 1 bottle, Rfl: 0  •  clopidogrel (PLAVIX) 75 MG tablet, Take 1 tablet by mouth Daily., Disp: 30 tablet, Rfl:   •  cyclobenzaprine (FLEXERIL) 5 MG tablet, Take 1 tablet by mouth Every 8 (Eight) Hours., Disp: , Rfl:   •  docusate sodium 100 MG capsule, Take 100 mg by mouth 2 (Two) Times a Day., Disp: , Rfl:   •  escitalopram (LEXAPRO) 20 MG tablet, Take 1 tablet by mouth Daily., Disp: 30 tablet, Rfl: 6  •  finasteride (PROSCAR) 5 MG tablet, Take 1 tablet by mouth Daily., Disp: , Rfl:   •  Fluticasone Furoate-Vilanterol (BREO ELLIPTA) 200-25 MCG/INH inhaler, Inhale 1 puff Daily. (Patient taking differently: Inhale 2  puffs Daily.), Disp: , Rfl:   •  furosemide (LASIX) 20 MG tablet, Take 1 tablet by mouth Daily., Disp: , Rfl:   •  ipratropium-albuterol (DUO-NEB) 0.5-2.5 mg/mL nebulizer, Take 3 mL by nebulization Every 6 (Six) Hours., Disp: 360 mL, Rfl:   •  lidocaine (LIDODERM) 5 %, Place 1 patch on the skin as directed by provider Daily. Remove & Discard patch within 12 hours or as directed by MD, Disp: , Rfl:   •  lisinopril (PRINIVIL,ZESTRIL) 5 MG tablet, Take 1 tablet by mouth Daily., Disp: , Rfl:   •  loperamide (IMODIUM A-D) 2 MG tablet, Take 1 tablet by mouth 4 (Four) Times a Day As Needed for Diarrhea., Disp: 12 tablet, Rfl: 0  •  melatonin 5 MG tablet tablet, ONE BY MOUTH EVERY NIGHT AT BEDTIME, Disp: 30 tablet, Rfl: 0  •  metoprolol tartrate (LOPRESSOR) 25 MG tablet, TAKE ONE TABLET BY MOUTH TWICE A DAY, Disp: 60 tablet, Rfl: 0  •  mirtazapine (REMERON) 15 MG tablet, Take 15 mg by mouth Every Night., Disp: , Rfl:   •  O2 (OXYGEN), Inhale 4 L/min 1 (One) Time. Walking on 4L and sitting 3L, Disp: , Rfl:   •  oxybutynin (DITROPAN) 5 MG tablet, Take 5 mg by mouth Every Night., Disp: , Rfl:   •  pantoprazole (PROTONIX) 40 MG EC tablet, TAKE ONE TABLET BY MOUTH DAILY, Disp: 30 tablet, Rfl: 0  •  cetirizine (zyrTEC) 10 MG tablet, Take 1 tablet by mouth Daily., Disp: 30 tablet, Rfl: 0  •  guaifenesin-dextromethorphan (MUCINEX DM)  MG tablet sustained-release 12 hour tablet, Take 1 tablet by mouth 2 (Two) Times a Day., Disp: 20 tablet, Rfl: 0  •  levoFLOXacin (LEVAQUIN) 500 MG tablet, Take 1 tablet by mouth Daily., Disp: 7 tablet, Rfl: 0    The following portions of the patient's history were reviewed and updated as appropriate: allergies, current medications, past family history, past medical history, past social history, past surgical history and problem list.    Review of Systems   Constitutional: Negative.    HENT: Positive for congestion and postnasal drip.         Epistaxix   Respiratory: Positive for cough.     Cardiovascular: Negative.    Gastrointestinal: Negative.    Skin: Negative.    Psychiatric/Behavioral: Negative.        Objective   Physical Exam   Constitutional: He is oriented to person, place, and time. He appears well-developed and well-nourished.   HENT:   Head: Normocephalic and atraumatic.   Left nostril septal bleeding   Neck: Neck supple.   Cardiovascular: Normal rate, regular rhythm and normal heart sounds.   Pulmonary/Chest: Effort normal and breath sounds normal.   Abdominal: Soft. Bowel sounds are normal.   Neurological: He is alert and oriented to person, place, and time.   Psychiatric: He has a normal mood and affect. His behavior is normal.       All tests have been reviewed.    Assessment/Plan   Diagnoses and all orders for this visit:    Bronchitis initiate Levaquin, hold iron pill while on levaquin    Sinusitis, -     guaifenesin-dextromethorphan (MUCINEX DM)  MG tablet sustained-release 12 hour tablet; Take 1 tablet by mouth 2 (Two) Times a Day.  -     cetirizine (zyrTEC) 10 MG tablet; Take 1 tablet by mouth Daily.        COPD with exacerbation (CMS/[facility]) PNPD    Other orders  -     levoFLOXacin (LEVAQUIN) 500 MG tablet; Take 1 tablet by mouth Daily.  hold calcium , vit D and MVI while taking levaquin    Epistaxis start nasal saline spray and compression      call if no better

## 2020-01-07 NOTE — OUTREACH NOTE
General Surgery Week 2 Survey      Responses   Facility patient discharged from?  [facility]   Does the patient have one of the following disease processes/diagnoses(primary or secondary)?  General Surgery   Week 2 attempt successful?  No   Unsuccessful attempts  Attempt 1          Sid Mcgregor RN

## 2020-01-07 NOTE — PATIENT INSTRUCTIONS
1,Bronchitis-     levoFLOXacin (LEVAQUIN) 500 MG tablet; Take 1 tablet by mouth Daily.hold calcium , vit D and MVI while taking levaquin  -     guaifenesin-dextromethorphan (MUCINEX DM)  MG tablet sustained-release 12 hour tablet; Take 1 tablet by mouth 2 (Two) Times a Day.            2,Sinusitis, unspecified chronicity, unspecified location.  -     cetirizine (zyrTEC) 10 MG tablet; Take 1 tablet by mouth Daily.          3,COPD with exacerbation (CMS/[facility]) please do PNPD for respiratory PT          4, Epistaxis, saline nose spray and compression if bleeing.

## 2020-01-08 ENCOUNTER — TELEPHONE (OUTPATIENT)
Dept: INTERNAL MEDICINE | Facility: CLINIC | Age: 70
End: 2020-01-08

## 2020-01-08 ENCOUNTER — READMISSION MANAGEMENT (OUTPATIENT)
Dept: CALL CENTER | Facility: HOSPITAL | Age: 70
End: 2020-01-08

## 2020-01-08 RX ORDER — FUROSEMIDE 20 MG/1
20 TABLET ORAL DAILY
Qty: 30 TABLET | Refills: 2 | Status: SHIPPED | OUTPATIENT
Start: 2020-01-08 | End: 2020-01-16 | Stop reason: SDUPTHER

## 2020-01-08 RX ORDER — PANTOPRAZOLE SODIUM 40 MG/1
40 TABLET, DELAYED RELEASE ORAL DAILY
Qty: 30 TABLET | Refills: 2 | Status: SHIPPED | OUTPATIENT
Start: 2020-01-08 | End: 2020-04-23

## 2020-01-08 RX ORDER — PSEUDOEPHEDRINE HCL 30 MG
100 TABLET ORAL 2 TIMES DAILY
Qty: 30 EACH | Refills: 2 | Status: SHIPPED | OUTPATIENT
Start: 2020-01-08 | End: 2020-04-23

## 2020-01-08 RX ORDER — ATORVASTATIN CALCIUM 10 MG/1
10 TABLET, FILM COATED ORAL NIGHTLY
Qty: 30 TABLET | Refills: 2 | Status: SHIPPED | OUTPATIENT
Start: 2020-01-08 | End: 2020-04-23

## 2020-01-08 RX ORDER — CLOPIDOGREL BISULFATE 75 MG/1
75 TABLET ORAL DAILY
Qty: 30 TABLET | Refills: 2 | Status: SHIPPED | OUTPATIENT
Start: 2020-01-08 | End: 2020-04-23

## 2020-01-08 RX ORDER — CYCLOBENZAPRINE HCL 5 MG
5 TABLET ORAL EVERY 8 HOURS SCHEDULED
Qty: 30 TABLET | Refills: 2 | Status: SHIPPED | OUTPATIENT
Start: 2020-01-08 | End: 2020-04-23

## 2020-01-08 RX ORDER — LISINOPRIL 5 MG/1
5 TABLET ORAL DAILY
Qty: 30 TABLET | Refills: 2 | Status: SHIPPED | OUTPATIENT
Start: 2020-01-08 | End: 2020-04-23

## 2020-01-08 RX ORDER — CETIRIZINE HYDROCHLORIDE 10 MG/1
10 TABLET ORAL DAILY
Qty: 30 TABLET | Refills: 0 | Status: SHIPPED | OUTPATIENT
Start: 2020-01-08 | End: 2020-02-07

## 2020-01-08 RX ORDER — GUAIFENESIN AND DEXTROMETHORPHAN HYDROBROMIDE 600; 30 MG/1; MG/1
1 TABLET, EXTENDED RELEASE ORAL 2 TIMES DAILY
Qty: 20 TABLET | Refills: 0 | Status: SHIPPED | OUTPATIENT
Start: 2020-01-08 | End: 2020-01-08

## 2020-01-08 RX ORDER — FINASTERIDE 5 MG/1
5 TABLET, FILM COATED ORAL DAILY
Qty: 30 TABLET | Refills: 2 | Status: SHIPPED | OUTPATIENT
Start: 2020-01-08 | End: 2020-04-23

## 2020-01-08 RX ORDER — LEVOFLOXACIN 500 MG/1
500 TABLET, FILM COATED ORAL DAILY
Qty: 7 TABLET | Refills: 0 | Status: SHIPPED | OUTPATIENT
Start: 2020-01-08 | End: 2020-02-03

## 2020-01-08 RX ORDER — CHOLECALCIFEROL (VITAMIN D3) 125 MCG
5 CAPSULE ORAL NIGHTLY
Qty: 30 TABLET | Refills: 2 | Status: SHIPPED | OUTPATIENT
Start: 2020-01-08 | End: 2020-04-23

## 2020-01-08 RX ORDER — LEVOFLOXACIN 500 MG/1
500 TABLET, FILM COATED ORAL DAILY
Qty: 7 TABLET | Refills: 0 | Status: SHIPPED | OUTPATIENT
Start: 2020-01-08 | End: 2020-01-08

## 2020-01-08 RX ORDER — ESCITALOPRAM OXALATE 20 MG/1
20 TABLET ORAL DAILY
Qty: 30 TABLET | Refills: 2 | Status: SHIPPED | OUTPATIENT
Start: 2020-01-08 | End: 2020-04-23

## 2020-01-08 RX ORDER — AMLODIPINE BESYLATE 5 MG/1
5 TABLET ORAL
Qty: 30 TABLET | Refills: 2 | Status: SHIPPED | OUTPATIENT
Start: 2020-01-08 | End: 2020-01-14

## 2020-01-08 RX ORDER — GUAIFENESIN AND DEXTROMETHORPHAN HYDROBROMIDE 600; 30 MG/1; MG/1
1 TABLET, EXTENDED RELEASE ORAL 2 TIMES DAILY
Qty: 20 TABLET | Refills: 0 | Status: SHIPPED | OUTPATIENT
Start: 2020-01-08 | End: 2020-02-03

## 2020-01-08 RX ORDER — CETIRIZINE HYDROCHLORIDE 10 MG/1
10 TABLET ORAL DAILY
Qty: 30 TABLET | Refills: 0 | Status: SHIPPED | OUTPATIENT
Start: 2020-01-08 | End: 2020-01-08

## 2020-01-08 RX ORDER — OXYBUTYNIN CHLORIDE 5 MG/1
5 TABLET ORAL NIGHTLY
Qty: 30 TABLET | Refills: 2 | Status: SHIPPED | OUTPATIENT
Start: 2020-01-08 | End: 2020-04-23

## 2020-01-08 NOTE — TELEPHONE ENCOUNTER
LIN FROM [facility] CALLED FOR MED REFILLS [phone]      amLODIPine (NORVASC) 5 MG tablet    atorvastatin (LIPITOR) 10 MG tablet    Calcium Carbonate-Vitamin D3 (CALCIUM 600-D) 600-400 MG-UNIT tablet  clopidogrel (PLAVIX) 75 MG tablet    FERREX 150 MG  TAKE ONE CAPSULE EVERYDAY    finasteride (PROSCAR) 5 MG tablet      furosemide (LASIX) 20 MG tablet    lisinopril (PRINIVIL,ZESTRIL) 5 MG tablet    metoprolol tartrate (LOPRESSOR) 25 MG tablet    mirtazapine (REMERON) 15 MG tablet    ONE DAILY ESSENTIAL MULTI VITAMIN    oxybutynin (DITROPAN) 5 MG tablet    pantoprazole (PROTONIX) 40 MG EC tablet    TRAZODONE 50 MG 1 TABLET EVERY EVENING    ALFUZOSIN 10 MG ONE DAILY    ASPIRIN 81 MG ONE DAILY    cyclobenzaprine (FLEXERIL) 5 MG tablet    docusate sodium 100 MG capsule    escitalopram (LEXAPRO) 20 MG tablet    melatonin 5 MG tablet tablet    clopidogrel (PLAVIX) 75 MG tablet

## 2020-01-08 NOTE — TELEPHONE ENCOUNTER
Printed off prescriptions of Cetirizine 10mg, levofloxacin 500mg, and mucinex dm 30-600mg sustained-release 12 hour tablet.   Faxed prescriptions over to M[facility] at [facility].

## 2020-01-08 NOTE — OUTREACH NOTE
General Surgery Week 2 Survey      Responses   Facility patient discharged from?  [facility]   Does the patient have one of the following disease processes/diagnoses(primary or secondary)?  General Surgery   Week 2 attempt successful?  Yes   Call start time  1812   Call end time  1815   General alerts for this patient  Resident of [facility]   Discharge diagnosis  colonoscopy with hot polypectomy, upper GI endoscopy   Meds reviewed with patient/caregiver?  Yes   Is the patient having any side effects they believe may be caused by any medication additions or changes?  No   Does the patient have all medications related to this admission filled (includes all antibiotics, pain medications, etc.)  Yes   Is the patient taking all medications as directed (includes completed medication regime)?  Yes   Does the patient have a follow up appointment scheduled with their surgeon?  Yes   Has the patient kept scheduled appointments due by today?  Yes   Comments  PCP appt 1/7/20   What is the Home health agency?   [facility]   Has home health visited the patient within 72 hours of discharge?  Unsure   What DME was ordered?  [employer] provides home O2 and CPAP   Has all DME been delivered?  Yes   Psychosocial issues?  No   Did the patient receive a copy of their discharge instructions?  Yes   Nursing interventions  Reviewed instructions with patient   What is the patient's perception of their health status since discharge?  Improving   Nursing interventions  Nurse provided patient education   Is the patient/caregiver able to teach back signs and symptoms of incisional infection?  Fever   Is the patient/caregiver able to teach back steps to recovery at home?  Eat a well-balance diet   Is the patient/caregiver able to teach back the hierarchy of who to call/visit for symptoms/problems? PCP, Specialist, Home health nurse, Urgent Care, ED, 911  Yes   Week 2 call completed?  Yes          Agustina Rodríguez RN

## 2020-01-08 NOTE — TELEPHONE ENCOUNTER
LEIDA FROM [facility] CALLING FOR MED REFILLS  guaifenesin-dextromethorphan (MUCINEX     DM)  MG tablet sustained-release 12 hour tablet    levoFLOXacin (LEVAQUIN) 500 MG tablet    cetirizine (zyrTEC) 10 MG tablet     LEIDA NEEDS THEM TO BE FAXED OVER TO  FAX #[phone] THEY WILL HAVE TO GO [facility]ROUGH [facility]    THEY DO NOT HAVE RESPIRATORY THERAPY WILL NEED TO SET UP WITH  HOME HEALTH [facility]  LEIDA'S CALL BACK NUMBER [phone]

## 2020-01-09 ENCOUNTER — TELEPHONE (OUTPATIENT)
Dept: INTERNAL MEDICINE | Facility: CLINIC | Age: 70
End: 2020-01-09

## 2020-01-09 NOTE — TELEPHONE ENCOUNTER
DAVID WITH [facility] CALLED AND STATED /58 WHEN STANDING ALMOST PASSED OUT. COULD NOT GET GOOD ORTHOSTATIC. WANTS TO KNOW IF DR CHOUDHARY WANTS TO HOLD ON BLOOD PRESSURE MEDICATIONS.     PLEASE CALL DAVID AND ADVISE [phone]

## 2020-01-10 ENCOUNTER — TELEPHONE (OUTPATIENT)
Dept: INTERNAL MEDICINE | Facility: CLINIC | Age: 70
End: 2020-01-10

## 2020-01-10 NOTE — TELEPHONE ENCOUNTER
Home Health called w/ BP readings. Around 10:30 and 10:45 therapy was there and the readings were 90/59 then standing it was 80/50. Around 2 home health is there and it is 96/52. Home health also commented that the facility did hold his amlodipine.

## 2020-01-10 NOTE — TELEPHONE ENCOUNTER
[facility] stated she faxed over a med list on this patient and wanted to discuss his medications with the nurse but I was concerned about just leaving a message due to low BP from yesterday so I transferred into the office to a higher level of care

## 2020-01-10 NOTE — TELEPHONE ENCOUNTER
Spoke with Becka, her callback # is [phone], advised her of what you told Shayy with [facility] about b/p. They are also wanting to know if we should d/c metoprolol at this time or continue it as prescribed?  Becka also is asking if patient is to be holding his calcium while he's on Levaquin or if they should give calcium 2 hours after levaquin?  Please advise?

## 2020-01-10 NOTE — TELEPHONE ENCOUNTER
Notified Pedro Pablo via returning faxed order sheet that they had sent for clarification on everything.

## 2020-01-10 NOTE — TELEPHONE ENCOUNTER
If blood pressure continues to be low we can hold metoprolol.  Calcium can be resumed after Levaquin.

## 2020-01-13 ENCOUNTER — TELEPHONE (OUTPATIENT)
Dept: INTERNAL MEDICINE | Facility: CLINIC | Age: 70
End: 2020-01-13

## 2020-01-13 NOTE — TELEPHONE ENCOUNTER
Pt's home health nurse called to give an update on findings before pt's appointment  Blood in urine, left side plank pain, painful urination and Current Bp 92/50      Shayy: [phone]

## 2020-01-13 NOTE — TELEPHONE ENCOUNTER
Left detailed vm stating for pt to go to ER, also tried giving Shayy a call back but could not get a hold of her left vm stating the same.

## 2020-01-14 ENCOUNTER — TELEPHONE (OUTPATIENT)
Dept: INTERNAL MEDICINE | Facility: CLINIC | Age: 70
End: 2020-01-14

## 2020-01-14 ENCOUNTER — OFFICE VISIT (OUTPATIENT)
Dept: INTERNAL MEDICINE | Facility: CLINIC | Age: 70
End: 2020-01-14

## 2020-01-14 VITALS
SYSTOLIC BLOOD PRESSURE: 108 MMHG | OXYGEN SATURATION: 95 % | HEIGHT: 68 IN | RESPIRATION RATE: 16 BRPM | DIASTOLIC BLOOD PRESSURE: 72 MMHG | BODY MASS INDEX: 25.09 KG/M2 | HEART RATE: 92 BPM | TEMPERATURE: 97.2 F

## 2020-01-14 DIAGNOSIS — R31.9 URINARY TRACT INFECTION WITH HEMATURIA, SITE UNSPECIFIED: ICD-10-CM

## 2020-01-14 DIAGNOSIS — I95.9 HYPOTENSION, UNSPECIFIED HYPOTENSION TYPE: ICD-10-CM

## 2020-01-14 DIAGNOSIS — R31.9 HEMATURIA, UNSPECIFIED TYPE: Primary | ICD-10-CM

## 2020-01-14 DIAGNOSIS — N39.0 URINARY TRACT INFECTION WITH HEMATURIA, SITE UNSPECIFIED: ICD-10-CM

## 2020-01-14 LAB
BILIRUB BLD-MCNC: NEGATIVE MG/DL
CLARITY, POC: ABNORMAL
COLOR UR: ABNORMAL
GLUCOSE UR STRIP-MCNC: NEGATIVE MG/DL
KETONES UR QL: NEGATIVE
LEUKOCYTE EST, POC: ABNORMAL
NITRITE UR-MCNC: NEGATIVE MG/ML
PH UR: 6 [PH] (ref 5–8)
PROT UR STRIP-MCNC: ABNORMAL MG/DL
RBC # UR STRIP: ABNORMAL /UL
SP GR UR: 1.01 (ref 1–1.03)
UROBILINOGEN UR QL: NORMAL

## 2020-01-14 PROCEDURE — 99214 OFFICE O/P EST MOD 30 MIN: CPT | Performed by: INTERNAL MEDICINE

## 2020-01-14 PROCEDURE — 81003 URINALYSIS AUTO W/O SCOPE: CPT | Performed by: INTERNAL MEDICINE

## 2020-01-14 NOTE — PROGRESS NOTES
Subjective   Jani Pena is a 69 y.o. male.     Chief Complaint   Patient presents with   • Follow-up     f/u on BP bottoming out, this morning BP was 97/50. Pt also states he has been passing blood through his urine.        History of Present Illness   Patient here for follow-up from nursing home.  Reportedly at the nursing home patient's blood pressure bottom out.  Amlodipine metoprolol discontinued,  blood pressure still around 110. Patient also complains dysuria urine frequency urgency.  Blood in urine.  No fever chills.  O2 sat normal on oxygen.  CBC stable.d  Current Outpatient Medications:   •  ALPRAZolam (XANAX) 0.5 MG tablet, Take 1 tablet by mouth At Night As Needed for Anxiety., Disp: 30 tablet, Rfl: 1  •  atorvastatin (LIPITOR) 10 MG tablet, Take 1 tablet by mouth Every Night., Disp: 30 tablet, Rfl: 2  •  Calcium Carbonate-Vitamin D3 (CALCIUM 600-D) 600-400 MG-UNIT tablet, Take 1 tablet by mouth 2 (Two) Times a Day., Disp: 60 tablet, Rfl: 2  •  carboxymethylcellulose (REFRESH PLUS) 0.5 % solution, Administer 1 drop to both eyes 3 (Three) Times a Day As Needed for Dry Eyes., Disp: 1 bottle, Rfl: 0  •  cetirizine (zyrTEC) 10 MG tablet, Take 1 tablet by mouth Daily for 30 days., Disp: 30 tablet, Rfl: 0  •  clopidogrel (PLAVIX) 75 MG tablet, Take 1 tablet by mouth Daily., Disp: 30 tablet, Rfl: 2  •  cyclobenzaprine (FLEXERIL) 5 MG tablet, Take 1 tablet by mouth Every 8 (Eight) Hours., Disp: 30 tablet, Rfl: 2  •  docusate sodium 100 MG capsule, Take 100 mg by mouth 2 (Two) Times a Day., Disp: 30 each, Rfl: 2  •  escitalopram (LEXAPRO) 20 MG tablet, Take 1 tablet by mouth Daily., Disp: 30 tablet, Rfl: 2  •  finasteride (PROSCAR) 5 MG tablet, Take 1 tablet by mouth Daily., Disp: 30 tablet, Rfl: 2  •  Fluticasone Furoate-Vilanterol (BREO ELLIPTA) 200-25 MCG/INH inhaler, Inhale 1 puff Daily. (Patient taking differently: Inhale 2 puffs Daily.), Disp: , Rfl:   •  furosemide (LASIX) 20 MG tablet, Take 1 tablet  by mouth Daily., Disp: 30 tablet, Rfl: 2  •  guaifenesin-dextromethorphan (MUCINEX DM)  MG tablet sustained-release 12 hour tablet, Take 1 tablet by mouth 2 (Two) Times a Day., Disp: 20 tablet, Rfl: 0  •  ipratropium-albuterol (DUO-NEB) 0.5-2.5 mg/mL nebulizer, Take 3 mL by nebulization Every 6 (Six) Hours., Disp: 360 mL, Rfl:   •  levoFLOXacin (LEVAQUIN) 500 MG tablet, Take 1 tablet by mouth Daily., Disp: 7 tablet, Rfl: 0  •  lidocaine (LIDODERM) 5 %, Place 1 patch on the skin as directed by provider Daily. Remove & Discard patch within 12 hours or as directed by MD, Disp: , Rfl:   •  lisinopril (PRINIVIL,ZESTRIL) 5 MG tablet, Take 1 tablet by mouth Daily., Disp: 30 tablet, Rfl: 2  •  loperamide (IMODIUM A-D) 2 MG tablet, Take 1 tablet by mouth 4 (Four) Times a Day As Needed for Diarrhea., Disp: 12 tablet, Rfl: 0  •  melatonin 5 MG tablet tablet, Take 1 tablet by mouth Every Night., Disp: 30 tablet, Rfl: 2  •  mirtazapine (REMERON) 15 MG tablet, Take 15 mg by mouth Every Night., Disp: , Rfl:   •  O2 (OXYGEN), Inhale 4 L/min 1 (One) Time. Walking on 4L and sitting 3L, Disp: , Rfl:   •  oxybutynin (DITROPAN) 5 MG tablet, Take 1 tablet by mouth Every Night., Disp: 30 tablet, Rfl: 2  •  pantoprazole (PROTONIX) 40 MG EC tablet, Take 1 tablet by mouth Daily., Disp: 30 tablet, Rfl: 2    The following portions of the patient's history were reviewed and updated as appropriate: allergies, current medications, past family history, past medical history, past social history, past surgical history and problem list.    Review of Systems   Constitutional: Positive for fatigue.   Respiratory: Positive for shortness of breath.    Cardiovascular: Negative.    Gastrointestinal: Negative.    Genitourinary: Positive for dysuria, frequency and urgency.   Musculoskeletal: Negative.    Skin: Negative.    Neurological: Negative.    Psychiatric/Behavioral: Negative.        Objective   Physical Exam   Constitutional: He is oriented to  person, place, and time. He appears well-nourished.   Neck: Neck supple.   Cardiovascular: Normal rate, regular rhythm and normal heart sounds.   Pulmonary/Chest: Effort normal. No respiratory distress. He has wheezes. He has rales.   Abdominal: Bowel sounds are normal.   Neurological: He is alert and oriented to person, place, and time.   Skin: Skin is warm.   Psychiatric: He has a normal mood and affect.       All tests have been reviewed.    Assessment/Plan   Diagnoses and all orders for this visit:    Hematuria, unspecified type  -     POCT urinalysis dipstick, automated    Hypotension, unspecified hypotension typeDiscontinue metoprolol Norvasc and hold the lisinopril for blood pressure below 130.    Urinary tract infection with hematuria, Initiate Bactrim 250 bid, 1 tablet twice a day for 7 days    Call if no better  Follow-up as scheduled

## 2020-01-16 ENCOUNTER — TELEPHONE (OUTPATIENT)
Dept: INTERNAL MEDICINE | Facility: CLINIC | Age: 70
End: 2020-01-16

## 2020-01-16 DIAGNOSIS — R31.9 HEMATURIA, UNSPECIFIED TYPE: Primary | ICD-10-CM

## 2020-01-16 RX ORDER — FUROSEMIDE 20 MG/1
TABLET ORAL
Qty: 30 TABLET | Refills: 2 | Status: SHIPPED | OUTPATIENT
Start: 2020-01-16 | End: 2020-01-01

## 2020-01-16 RX ORDER — FUROSEMIDE 20 MG/1
TABLET ORAL
Qty: 30 TABLET | Refills: 2 | Status: SHIPPED | OUTPATIENT
Start: 2020-01-16 | End: 2020-01-16 | Stop reason: SDUPTHER

## 2020-01-16 NOTE — TELEPHONE ENCOUNTER
Ellie Quiroga physical therapist called and gave me an update about Jani, she stated she had seen Jani yesterday and his BP was low, 82/48 and he was still having increased blood in urine. Notified her that he was on abx for his UTI currently but I would let you know what was going on with him. She also stated she tried getting him to go to the ER but he denied as he did not want to go. Just an FYI.

## 2020-01-17 LAB
BASOPHILS # BLD AUTO: 0.08 10*3/MM3 (ref 0–0.2)
BASOPHILS NFR BLD AUTO: 1.1 % (ref 0–1.5)
EOSINOPHIL # BLD AUTO: 0.31 10*3/MM3 (ref 0–0.4)
EOSINOPHIL NFR BLD AUTO: 4.3 % (ref 0.3–6.2)
ERYTHROCYTE [DISTWIDTH] IN BLOOD BY AUTOMATED COUNT: 11.9 % (ref 12.3–15.4)
HCT VFR BLD AUTO: 32.1 % (ref 37.5–51)
HGB BLD-MCNC: 10.4 G/DL (ref 13–17.7)
IMM GRANULOCYTES # BLD AUTO: 0.03 10*3/MM3 (ref 0–0.05)
IMM GRANULOCYTES NFR BLD AUTO: 0.4 % (ref 0–0.5)
LYMPHOCYTES # BLD AUTO: 1.87 10*3/MM3 (ref 0.7–3.1)
LYMPHOCYTES NFR BLD AUTO: 25.7 % (ref 19.6–45.3)
MCH RBC QN AUTO: 29.3 PG (ref 26.6–33)
MCHC RBC AUTO-ENTMCNC: 32.4 G/DL (ref 31.5–35.7)
MCV RBC AUTO: 90.4 FL (ref 79–97)
MONOCYTES # BLD AUTO: 0.81 10*3/MM3 (ref 0.1–0.9)
MONOCYTES NFR BLD AUTO: 11.1 % (ref 5–12)
NEUTROPHILS # BLD AUTO: 4.18 10*3/MM3 (ref 1.7–7)
NEUTROPHILS NFR BLD AUTO: 57.4 % (ref 42.7–76)
NRBC BLD AUTO-RTO: 0 /100 WBC (ref 0–0.2)
PLATELET # BLD AUTO: 297 10*3/MM3 (ref 140–450)
RBC # BLD AUTO: 3.55 10*6/MM3 (ref 4.14–5.8)
WBC # BLD AUTO: 7.28 10*3/MM3 (ref 3.4–10.8)

## 2020-01-18 ENCOUNTER — READMISSION MANAGEMENT (OUTPATIENT)
Dept: CALL CENTER | Facility: HOSPITAL | Age: 70
End: 2020-01-18

## 2020-01-18 ENCOUNTER — CLINICAL SUPPORT NO REQUIREMENTS (OUTPATIENT)
Dept: CARDIOLOGY | Facility: CLINIC | Age: 70
End: 2020-01-18

## 2020-01-18 DIAGNOSIS — I49.5 SICK SINUS SYNDROME (HCC): ICD-10-CM

## 2020-01-18 PROCEDURE — 93296 REM INTERROG EVL PM/IDS: CPT | Performed by: INTERNAL MEDICINE

## 2020-01-18 PROCEDURE — 93294 REM INTERROG EVL PM/LDLS PM: CPT | Performed by: INTERNAL MEDICINE

## 2020-01-18 NOTE — OUTREACH NOTE
General Surgery Week 3 Survey      Responses   Facility patient discharged from?  [facility]   Does the patient have one of the following disease processes/diagnoses(primary or secondary)?  General Surgery   Week 3 attempt successful?  No   Unsuccessful attempts  Attempt 1          Tomasa Church RN

## 2020-01-20 ENCOUNTER — READMISSION MANAGEMENT (OUTPATIENT)
Dept: CALL CENTER | Facility: HOSPITAL | Age: 70
End: 2020-01-20

## 2020-01-20 NOTE — OUTREACH NOTE
"General Surgery Week 3 Survey      Responses   Facility patient discharged from?  [facility]   Does the patient have one of the following disease processes/diagnoses(primary or secondary)?  General Surgery   Week 3 attempt successful?  Yes   Call start time  1555   Call end time  1600   Meds reviewed with patient/caregiver?  Yes   Is the patient having any side effects they believe may be caused by any medication additions or changes?  No   Does the patient have all medications related to this admission filled (includes all antibiotics, pain medications, etc.)  Yes   Is the patient taking all medications as directed (includes completed medication regime)?  Yes   Medication comments  \"antibiotics\" for UTI-will finish this week   Does the patient have a follow up appointment scheduled with their surgeon?  Yes   Has the patient kept scheduled appointments due by today?  Yes   Comments  urology appt next week   What is the Home health agency?   [facility]   Has home health visited the patient within 72 hours of discharge?  Yes   Home health comments  PT, OT and home health nurse   Has all DME been delivered?  Yes   Psychosocial issues?  No   Psychosocial comments  Staying at assisted living for now.   Did the patient receive a copy of their discharge instructions?  Yes   Nursing interventions  Reviewed instructions with patient   What is the patient's perception of their health status since discharge?  Improving   Nursing interventions  Nurse provided patient education   Is the patient/caregiver able to teach back the hierarchy of who to call/visit for symptoms/problems? PCP, Specialist, Home health nurse, Urgent Care, ED, 911  Yes   Week 3 call completed?  Yes   Wrap up additional comments  States continues to be tired-taking antibiotics for UTI. States will see urologist next week. No new complaints today.          Zaina Guerin RN  "

## 2020-01-28 ENCOUNTER — READMISSION MANAGEMENT (OUTPATIENT)
Dept: CALL CENTER | Facility: HOSPITAL | Age: 70
End: 2020-01-28

## 2020-01-29 ENCOUNTER — PRIOR AUTHORIZATION (OUTPATIENT)
Dept: INTERNAL MEDICINE | Facility: CLINIC | Age: 70
End: 2020-01-29

## 2020-01-29 RX ORDER — LIDOCAINE 50 MG/G
1 PATCH TOPICAL
Start: 2020-01-29 | End: 2020-03-18

## 2020-01-29 NOTE — OUTREACH NOTE
General Surgery Week 4 Survey      Responses   Facility patient discharged from?  [facility]   Does the patient have one of the following disease processes/diagnoses(primary or secondary)?  General Surgery   Week 4 attempt successful?  No          Akilah Bond RN

## 2020-01-31 ENCOUNTER — TELEPHONE (OUTPATIENT)
Dept: INTERNAL MEDICINE | Facility: CLINIC | Age: 70
End: 2020-01-31

## 2020-01-31 RX ORDER — MIRTAZAPINE 15 MG/1
15 TABLET, FILM COATED ORAL NIGHTLY
Qty: 30 TABLET | Refills: 2 | Status: SHIPPED | OUTPATIENT
Start: 2020-01-31 | End: 2020-05-20

## 2020-01-31 NOTE — TELEPHONE ENCOUNTER
Shayy with [facility] is requesting continuing home health nursing orders for 1 time a week for 4 weeks.  Shayy request call back.

## 2020-01-31 NOTE — TELEPHONE ENCOUNTER
[facility] called requesting refill for Mirtazipine 15 mg. 1 x nightly    [facility], [place]

## 2020-02-03 ENCOUNTER — OFFICE VISIT (OUTPATIENT)
Dept: INTERNAL MEDICINE | Facility: CLINIC | Age: 70
End: 2020-02-03

## 2020-02-03 VITALS
SYSTOLIC BLOOD PRESSURE: 108 MMHG | DIASTOLIC BLOOD PRESSURE: 64 MMHG | BODY MASS INDEX: 25.46 KG/M2 | HEART RATE: 87 BPM | WEIGHT: 168 LBS | TEMPERATURE: 97.4 F | OXYGEN SATURATION: 93 % | HEIGHT: 68 IN

## 2020-02-03 DIAGNOSIS — Z87.74 HISTORY OF VENTRICULAR SEPTAL DEFECT: ICD-10-CM

## 2020-02-03 DIAGNOSIS — M40.209 KYPHOSIS, ACQUIRED: ICD-10-CM

## 2020-02-03 DIAGNOSIS — F32.A DEPRESSION, UNSPECIFIED DEPRESSION TYPE: ICD-10-CM

## 2020-02-03 DIAGNOSIS — K57.90 DIVERTICULOSIS: ICD-10-CM

## 2020-02-03 DIAGNOSIS — I49.5 SICK SINUS SYNDROME (HCC): ICD-10-CM

## 2020-02-03 DIAGNOSIS — G47.33 OSA ON CPAP: ICD-10-CM

## 2020-02-03 DIAGNOSIS — I25.119 CORONARY ARTERY DISEASE INVOLVING NATIVE CORONARY ARTERY OF NATIVE HEART WITH ANGINA PECTORIS (HCC): ICD-10-CM

## 2020-02-03 DIAGNOSIS — I50.32 CHRONIC DIASTOLIC CONGESTIVE HEART FAILURE (HCC): ICD-10-CM

## 2020-02-03 DIAGNOSIS — K44.9 HIATAL HERNIA: ICD-10-CM

## 2020-02-03 DIAGNOSIS — D64.9 ANEMIA, UNSPECIFIED TYPE: ICD-10-CM

## 2020-02-03 DIAGNOSIS — J96.12 CHRONIC RESPIRATORY FAILURE WITH HYPOXIA AND HYPERCAPNIA (HCC): ICD-10-CM

## 2020-02-03 DIAGNOSIS — Z99.89 OSA ON CPAP: ICD-10-CM

## 2020-02-03 DIAGNOSIS — I48.0 PAROXYSMAL ATRIAL FIBRILLATION (HCC): ICD-10-CM

## 2020-02-03 DIAGNOSIS — R39.14 BENIGN PROSTATIC HYPERPLASIA WITH INCOMPLETE BLADDER EMPTYING: ICD-10-CM

## 2020-02-03 DIAGNOSIS — N40.1 BENIGN PROSTATIC HYPERPLASIA WITH INCOMPLETE BLADDER EMPTYING: ICD-10-CM

## 2020-02-03 DIAGNOSIS — N39.0 URINARY TRACT INFECTION WITH HEMATURIA, SITE UNSPECIFIED: ICD-10-CM

## 2020-02-03 DIAGNOSIS — I10 ESSENTIAL HYPERTENSION: Primary | ICD-10-CM

## 2020-02-03 DIAGNOSIS — E78.2 MIXED HYPERLIPIDEMIA: ICD-10-CM

## 2020-02-03 DIAGNOSIS — I95.9 HYPOTENSION, UNSPECIFIED HYPOTENSION TYPE: ICD-10-CM

## 2020-02-03 DIAGNOSIS — R31.9 URINARY TRACT INFECTION WITH HEMATURIA, SITE UNSPECIFIED: ICD-10-CM

## 2020-02-03 DIAGNOSIS — N18.9 CHRONIC KIDNEY DISEASE, UNSPECIFIED CKD STAGE: ICD-10-CM

## 2020-02-03 DIAGNOSIS — M50.30 DEGENERATION OF CERVICAL INTERVERTEBRAL DISC: ICD-10-CM

## 2020-02-03 DIAGNOSIS — Z95.0 CARDIAC PACEMAKER IN SITU: ICD-10-CM

## 2020-02-03 DIAGNOSIS — J44.9 STEROID-DEPENDENT COPD (HCC): ICD-10-CM

## 2020-02-03 DIAGNOSIS — F41.9 ANXIETY: ICD-10-CM

## 2020-02-03 DIAGNOSIS — M54.16 LUMBAR RADICULOPATHY: ICD-10-CM

## 2020-02-03 DIAGNOSIS — J96.11 CHRONIC RESPIRATORY FAILURE WITH HYPOXIA AND HYPERCAPNIA (HCC): ICD-10-CM

## 2020-02-03 DIAGNOSIS — K21.9 GERD WITHOUT ESOPHAGITIS: ICD-10-CM

## 2020-02-03 DIAGNOSIS — R13.12 OROPHARYNGEAL DYSPHAGIA: ICD-10-CM

## 2020-02-03 DIAGNOSIS — J44.1 COPD WITH EXACERBATION (HCC): ICD-10-CM

## 2020-02-03 PROBLEM — Z74.09 IMPAIRED MOBILITY AND ADLS: Status: RESOLVED | Noted: 2017-09-17 | Resolved: 2020-02-03

## 2020-02-03 PROBLEM — J96.21 ACUTE ON CHRONIC RESPIRATORY FAILURE WITH HYPOXIA AND HYPERCAPNIA (HCC): Status: RESOLVED | Noted: 2019-11-10 | Resolved: 2020-02-03

## 2020-02-03 PROBLEM — J18.9 PNEUMONIA DUE TO INFECTIOUS ORGANISM: Status: RESOLVED | Noted: 2017-09-08 | Resolved: 2020-02-03

## 2020-02-03 PROBLEM — J40 BRONCHITIS: Status: RESOLVED | Noted: 2018-02-20 | Resolved: 2020-02-03

## 2020-02-03 PROBLEM — J32.9 SINUSITIS: Status: RESOLVED | Noted: 2020-01-07 | Resolved: 2020-02-03

## 2020-02-03 PROBLEM — T14.8XXA HEMATOMA: Status: RESOLVED | Noted: 2019-11-08 | Resolved: 2020-02-03

## 2020-02-03 PROBLEM — S40.021A BRUISE OF BOTH ARMS: Status: RESOLVED | Noted: 2019-11-10 | Resolved: 2020-02-03

## 2020-02-03 PROBLEM — N17.9 AKI (ACUTE KIDNEY INJURY) (HCC): Status: RESOLVED | Noted: 2019-12-26 | Resolved: 2020-02-03

## 2020-02-03 PROBLEM — K92.1 MELENA: Status: RESOLVED | Noted: 2019-12-27 | Resolved: 2020-02-03

## 2020-02-03 PROBLEM — Z78.9 IMPAIRED MOBILITY AND ADLS: Status: RESOLVED | Noted: 2017-09-17 | Resolved: 2020-02-03

## 2020-02-03 PROBLEM — J96.22 ACUTE ON CHRONIC RESPIRATORY FAILURE WITH HYPOXIA AND HYPERCAPNIA (HCC): Status: RESOLVED | Noted: 2019-11-10 | Resolved: 2020-02-03

## 2020-02-03 PROBLEM — Z79.899 POLYPHARMACY: Status: RESOLVED | Noted: 2019-08-29 | Resolved: 2020-02-03

## 2020-02-03 PROBLEM — S40.022A BRUISE OF BOTH ARMS: Status: RESOLVED | Noted: 2019-11-10 | Resolved: 2020-02-03

## 2020-02-03 PROBLEM — R29.6 REPEATED FALLS: Status: RESOLVED | Noted: 2019-11-10 | Resolved: 2020-02-03

## 2020-02-03 LAB
BILIRUB BLD-MCNC: NEGATIVE MG/DL
CLARITY, POC: CLEAR
COLOR UR: YELLOW
GLUCOSE UR STRIP-MCNC: NEGATIVE MG/DL
KETONES UR QL: NEGATIVE
LEUKOCYTE EST, POC: NEGATIVE
NITRITE UR-MCNC: NEGATIVE MG/ML
PH UR: 6 [PH] (ref 5–8)
PROT UR STRIP-MCNC: NEGATIVE MG/DL
RBC # UR STRIP: NEGATIVE /UL
SP GR UR: 1.01 (ref 1–1.03)
UROBILINOGEN UR QL: NORMAL

## 2020-02-03 PROCEDURE — G0439 PPPS, SUBSEQ VISIT: HCPCS | Performed by: INTERNAL MEDICINE

## 2020-02-03 PROCEDURE — 99397 PER PM REEVAL EST PAT 65+ YR: CPT | Performed by: INTERNAL MEDICINE

## 2020-02-03 PROCEDURE — G0009 ADMIN PNEUMOCOCCAL VACCINE: HCPCS | Performed by: INTERNAL MEDICINE

## 2020-02-03 PROCEDURE — 90732 PPSV23 VACC 2 YRS+ SUBQ/IM: CPT | Performed by: INTERNAL MEDICINE

## 2020-02-03 PROCEDURE — 81003 URINALYSIS AUTO W/O SCOPE: CPT | Performed by: INTERNAL MEDICINE

## 2020-02-03 RX ORDER — ASPIRIN 81 MG/1
81 TABLET, CHEWABLE ORAL DAILY
COMMUNITY
End: 2020-02-14

## 2020-02-03 RX ORDER — ALFUZOSIN HYDROCHLORIDE 10 MG/1
10 TABLET, EXTENDED RELEASE ORAL DAILY
COMMUNITY
End: 2020-02-14

## 2020-02-03 RX ORDER — METHYLPREDNISOLONE 4 MG/1
TABLET ORAL
COMMUNITY
Start: 2020-01-27 | End: 2020-02-21

## 2020-02-03 RX ORDER — AMOXICILLIN AND CLAVULANATE POTASSIUM 500; 125 MG/1; MG/1
TABLET, FILM COATED ORAL
COMMUNITY
Start: 2020-01-27 | End: 2020-02-21

## 2020-02-03 NOTE — PROGRESS NOTES
The ABCs of the Annual Wellness Visit  Subsequent Medicare Wellness Visit    Chief Complaint   Patient presents with   • Medicare Wellness-subsequent       Subjective   History of Present Illness:  Jani Pena is a 69 y.o. male who presents for a Subsequent Medicare Wellness Visit.    HEALTH RISK ASSESSMENT    Recent Hospitalizations:  No hospitalization(s) within the last year.    Current Medical Providers:  Patient Care Team:  Miguel Rojas MD as PCP - General  Miguel Rojas MD as PCP - Family Medicine  Ulysses Bass MD as Cardiologist (Cardiology)    Smoking Status:  Social History     Tobacco Use   Smoking Status Former Smoker   • Packs/day: 3.00   • Years: 30.00   • Pack years: 90.00   • Types: Cigarettes   • Last attempt to quit: 2017   • Years since quitting: 3.0   Smokeless Tobacco Never Used       Alcohol Consumption:  Social History     Substance and Sexual Activity   Alcohol Use No       Depression Screen:   PHQ-2/PHQ-9 Depression Screening 2/3/2020   Little interest or pleasure in doing things 0   Feeling down, depressed, or hopeless 0   Trouble falling or staying asleep, or sleeping too much -   Feeling tired or having little energy -   Poor appetite or overeating -   Feeling bad about yourself - or that you are a failure or have let yourself or your family down -   Trouble concentrating on things, such as reading the newspaper or watching television -   Moving or speaking so slowly that other people could have noticed. Or the opposite - being so fidgety or restless that you have been moving around a lot more than usual -   Thoughts that you would be better off dead, or of hurting yourself in some way -   Total Score 0   If you checked off any problems, how difficult have these problems made it for you to do your work, take care of things at home, or get along with other people? -       Fall Risk Screen:  STEADI Fall Risk Assessment was completed, and patient is at MODERATE risk for falls.  Assessment completed on:2/3/2020    Health Habits and Functional and Cognitive Screening:  Functional & Cognitive Status 2/3/2020   Do you have difficulty preparing food and eating? Yes   Do you have difficulty bathing yourself, getting dressed or grooming yourself? No   Do you have difficulty using the toilet? No   Do you have difficulty moving around from place to place? Yes   Do you have trouble with steps or getting out of a bed or a chair? Yes   Current Diet Well Balanced Diet   Dental Exam Up to date   Eye Exam Up to date   Exercise (times per week) 0 times per week   Current Exercise Activities Include None   Do you need help using the phone?  No   Are you deaf or do you have serious difficulty hearing?  No   Do you need help with transportation? Yes   Do you need help shopping? Yes   Do you need help preparing meals?  Yes   Do you need help with housework?  Yes   Do you need help with laundry? Yes   Do you need help taking your medications? No   Do you need help managing money? No   Do you ever drive or ride in a car without wearing a seat belt? No   Have you felt unusual stress, anger or loneliness in the last month? Yes   Who do you live with? Alone   If you need help, do you have trouble finding someone available to you? No   Have you been bothered in the last four weeks by sexual problems? No   Do you have difficulty concentrating, remembering or making decisions? No         Does the patient have evidence of cognitive impairment? No    Asprin use counseling:Taking ASA appropriately as indicated    Age-appropriate Screening Schedule:  Refer to the list below for future screening recommendations based on patient's age, sex and/or medical conditions. Orders for these recommended tests are listed in the plan section. The patient has been provided with a written plan.    Health Maintenance   Topic Date Due   • URINE MICROALBUMIN  1950   • TDAP/TD VACCINES (1 - Tdap) 05/13/1961   • ZOSTER VACCINE (1 of  2) 05/13/2000   • DIABETIC FOOT EXAM  05/05/2016   • DIABETIC EYE EXAM  05/05/2016   • PNEUMOCOCCAL VACCINE (65+ HIGH RISK) (2 of 2 - PPSV23) 03/16/2017   • INFLUENZA VACCINE  08/01/2019   • HEMOGLOBIN A1C  02/29/2020   • LIPID PANEL  08/30/2020   • COLONOSCOPY  12/28/2029          The following portions of the patient's history were reviewed and updated as appropriate: allergies, current medications, past family history, past medical history, past social history, past surgical history and problem list.    Outpatient Medications Prior to Visit   Medication Sig Dispense Refill   • alfuzosin (UROXATRAL) 10 MG 24 hr tablet Take 10 mg by mouth Daily.     • amoxicillin-clavulanate (AUGMENTIN) 500-125 MG per tablet      • aspirin 81 MG chewable tablet Chew 81 mg Daily.     • atorvastatin (LIPITOR) 10 MG tablet Take 1 tablet by mouth Every Night. 30 tablet 2   • carboxymethylcellulose (REFRESH PLUS) 0.5 % solution Administer 1 drop to both eyes 3 (Three) Times a Day As Needed for Dry Eyes. 1 bottle 0   • cetirizine (zyrTEC) 10 MG tablet Take 1 tablet by mouth Daily for 30 days. 30 tablet 0   • clopidogrel (PLAVIX) 75 MG tablet Take 1 tablet by mouth Daily. 30 tablet 2   • cyclobenzaprine (FLEXERIL) 5 MG tablet Take 1 tablet by mouth Every 8 (Eight) Hours. 30 tablet 2   • docusate sodium 100 MG capsule Take 100 mg by mouth 2 (Two) Times a Day. 30 each 2   • escitalopram (LEXAPRO) 20 MG tablet Take 1 tablet by mouth Daily. 30 tablet 2   • finasteride (PROSCAR) 5 MG tablet Take 1 tablet by mouth Daily. 30 tablet 2   • Fluticasone Furoate-Vilanterol (BREO ELLIPTA) 200-25 MCG/INH inhaler Inhale 1 puff Daily. (Patient taking differently: Inhale 2 puffs Daily.)     • furosemide (LASIX) 20 MG tablet HOLD LASIX UNTIL FURTHER NOTICE 30 tablet 2   • ipratropium-albuterol (DUO-NEB) 0.5-2.5 mg/mL nebulizer Take 3 mL by nebulization Every 6 (Six) Hours. 360 mL    • levoFLOXacin (LEVAQUIN) 500 MG tablet Take 1 tablet by mouth Daily. 7  tablet 0   • lidocaine (LIDODERM) 5 % Place 1 patch on the skin as directed by provider Daily. Remove & Discard patch within 12 hours or as directed by MD     • lisinopril (PRINIVIL,ZESTRIL) 5 MG tablet Take 1 tablet by mouth Daily. 30 tablet 2   • melatonin 5 MG tablet tablet Take 1 tablet by mouth Every Night. 30 tablet 2   • methylPREDNISolone (MEDROL, EFRAIN,) 4 MG tablet      • mirtazapine (REMERON) 15 MG tablet Take 1 tablet by mouth Every Night. 30 tablet 2   • O2 (OXYGEN) Inhale 4 L/min 1 (One) Time. Walking on 4L and sitting 3L     • oxybutynin (DITROPAN) 5 MG tablet Take 1 tablet by mouth Every Night. 30 tablet 2   • pantoprazole (PROTONIX) 40 MG EC tablet Take 1 tablet by mouth Daily. 30 tablet 2   • ALPRAZolam (XANAX) 0.5 MG tablet Take 1 tablet by mouth At Night As Needed for Anxiety. 30 tablet 1   • Calcium Carbonate-Vitamin D3 (CALCIUM 600-D) 600-400 MG-UNIT tablet Take 1 tablet by mouth 2 (Two) Times a Day. 60 tablet 2   • guaifenesin-dextromethorphan (MUCINEX DM)  MG tablet sustained-release 12 hour tablet Take 1 tablet by mouth 2 (Two) Times a Day. 20 tablet 0   • loperamide (IMODIUM A-D) 2 MG tablet Take 1 tablet by mouth 4 (Four) Times a Day As Needed for Diarrhea. 12 tablet 0     No facility-administered medications prior to visit.        Patient Active Problem List   Diagnosis   • Anxiety   • Atrial fibrillation (CMS/HCC)   • Chronic kidney disease   • Steroid-dependent COPD (CMS/HCC)   • Degeneration of cervical intervertebral disc   • GERD without esophagitis   • Diverticulosis   • Hemorrhoids   • Hiatal hernia   • Hyperlipidemia   • Kyphosis, acquired   • PEDRO on CPAP   • Benign prostatic hyperplasia with incomplete bladder emptying   • Depression   • History of ventricular septal defect   • COPD with exacerbation (CMS/HCC)   • Chronic diastolic congestive heart failure (CMS/HCC)   • Coronary artery disease involving native coronary artery of native heart with angina pectoris (CMS/HCC)   •  "Lumbar radiculopathy   • Dysphagia   • Anemia   • Cardiac pacemaker in situ   • Sick sinus syndrome (CMS/HCC)   • Chronic respiratory failure with hypoxia and hypercapnia (CMS/HCC)   • Urinary tract infection with hematuria   • Hypotension       Advanced Care Planning:  Patient has an advance directive - a copy has been provided and is visible in patient header    Review of Systems   Constitutional: Negative.    HENT: Negative.    Eyes: Negative.    Respiratory: Positive for cough and shortness of breath.    Cardiovascular: Negative.    Gastrointestinal: Negative.    Endocrine: Negative.    Genitourinary: Negative.    Musculoskeletal: Negative.    Skin: Negative.    Allergic/Immunologic: Negative.    Neurological: Negative.    Hematological: Negative.    Psychiatric/Behavioral: Negative.    All other systems reviewed and are negative.      Compared to one year ago, the patient feels his physical health is the same.  Compared to one year ago, the patient feels his mental health is the same.    Reviewed chart for potential of high risk medication in the elderly: yes  Reviewed chart for potential of harmful drug interactions in the elderly:no    Objective         Vitals:    02/03/20 1134   BP: 108/64   Pulse: 87   Temp: 97.4 °F (36.3 °C)   TempSrc: Temporal   SpO2: 93%  Comment: Pt using O2, 3 liters siting.   Weight: 76.2 kg (168 lb)   Height: 172.7 cm (67.99\")   PainSc:   4       Body mass index is 25.55 kg/m².  Discussed the patient's BMI with him. The BMI is in the acceptable range.    Physical Exam   Constitutional: He is oriented to person, place, and time. He appears well-developed and well-nourished.   HENT:   Head: Normocephalic and atraumatic.   Right Ear: External ear normal.   Left Ear: External ear normal.   Nose: Nose normal.   Mouth/Throat: Oropharynx is clear and moist.   Eyes: Pupils are equal, round, and reactive to light. Conjunctivae and EOM are normal.   Neck: Normal range of motion. Neck supple. " No thyromegaly present.   Cardiovascular: Normal rate, regular rhythm, normal heart sounds and intact distal pulses.   Pulmonary/Chest: Effort normal and breath sounds normal.   Abdominal: Soft. Bowel sounds are normal.   Musculoskeletal: Normal range of motion.   Neurological: He is alert and oriented to person, place, and time. He has normal reflexes.   Skin: Skin is warm and dry.   Psychiatric: He has a normal mood and affect. His behavior is normal. Judgment and thought content normal.   Nursing note and vitals reviewed.      Lab Results   Component Value Date     (H) 11/06/2019        Assessment/Plan   Medicare Risks and Personalized Health Plan  CMS Preventative Services Quick Reference  Advance Directive Discussion    The above risks/problems have been discussed with the patient.  Pertinent information has been shared with the patient in the After Visit Summary.  Follow up plans and orders are seen below in the Assessment/Plan Section.    Diagnoses and all orders for this visit:    1. Essential hypertension (Primary) resume lisinopril 5 mg at night    2. Hypotension, improved after d/c other bp meds    3. Urinary tract infection with hematuria, site unspecified    4. Paroxysmal atrial fibrillation (CMS/[facility])    5. Mixed hyperlipidemia    6. Chronic diastolic congestive heart failure (CMS/HCC)    7. Coronary artery disease involving native coronary artery of native heart with angina pectoris (CMS/[facility])    8. Cardiac pacemaker in situ    9. Sick sinus syndrome (CMS/HCC)    10. Steroid-dependent COPD (CMS/HCC)    11. PEDRO on CPAP    12. COPD with exacerbation (CMS/HCC)    13. Chronic respiratory failure with hypoxia and hypercapnia (CMS/HCC)    14. GERD without esophagitis    15. Diverticulosis    16. Oropharyngeal dysphagia    17. Lumbar radiculopathy    18. Degeneration of cervical intervertebral disc    19. Kyphosis, acquired    20. Chronic kidney disease, unspecified CKD stage    21. Benign prostatic  hyperplasia with incomplete bladder emptying    22. Anemia, unspecified type    23. Anxiety    24. Hiatal hernia    25. Depression, unspecified depression type    26. History of ventricular septal defect    Cont all meds for above  Follow Up:  No follow-ups on file.     An After Visit Summary and PPPS were given to the patient.         Below is to historical records for reference only: Chronic diastolic congestive heart failure (CMS/HCC) stable now  Coronary artery disease cath negative  SSS, s/p Cardiac pacemaker in situ follow cardio  Steroid-dependent COPD (CMS/HCC),  continue present mx recent exacerbation, improved after medication.   Esophageal dysphagia, s/p dilation, still dysphagia. GERD without esophagitis, UGI NSD,  follow up with GI,   Lumbar radiculopathy stable   Chronic kidney disease, stage III (moderate) (CMS/HCC) watch   Left neck pain continue baclofen tylenol and PT  osteopenia, oscal D 500mg bid continue  left low back pain resolved follow up with ortho s/p left hip surgery for severe OA doing well.   divertic hemorroid on colon 1/2013  DM stable without med  phimosis seen by uro declined surgery  Hematuria followup with urologist s/p cystoscope bladder stones  Hypertension continue med  BPH enlarged on CT scan follow up with urologist.  Anemia workup all negative.? anemia of chronic disease due to chronic disease   History of the CAD atrial fibrillation VSD status post occlusion stable now. Status post a defibrillator seen by cardio follow up with cardio, occa chest pain follow cardio  History of a TIA patient is on Plavix continue  pneumovax done, prevnar 13 1/19/17, Td to HD.flushot done, shingrix informed  Depression anxiety cont lexapro to 20 daily and cont xanax prn  Decline to disrobe for PE  right inquinal hernia decline surgery now, call if worse  BLE edema mild, watch for now, cut down salt, avoid soda  Gall stone without sx  Rt flank pain do UA and US  CKD follow up with  renal     Patient has been erroneously marked as diabetic. Based on the available clinical information, he does not have diabetes and should therefore be excluded from diabetic health maintenance and quality measures for the remainder of the reporting period.

## 2020-02-14 RX ORDER — ASPIRIN 81 MG
TABLET,CHEWABLE ORAL
Qty: 30 TABLET | Refills: 0 | Status: SHIPPED | OUTPATIENT
Start: 2020-02-14 | End: 2020-02-17 | Stop reason: SDUPTHER

## 2020-02-14 RX ORDER — ALFUZOSIN HYDROCHLORIDE 10 MG/1
TABLET, EXTENDED RELEASE ORAL
Qty: 30 TABLET | Refills: 0 | Status: SHIPPED | OUTPATIENT
Start: 2020-02-14 | End: 2020-02-17 | Stop reason: SDUPTHER

## 2020-02-14 RX ORDER — TRAZODONE HYDROCHLORIDE 50 MG/1
TABLET ORAL
Qty: 30 TABLET | Refills: 0 | Status: SHIPPED | OUTPATIENT
Start: 2020-02-14 | End: 2020-02-17 | Stop reason: SDUPTHER

## 2020-02-17 RX ORDER — ALFUZOSIN HYDROCHLORIDE 10 MG/1
10 TABLET, EXTENDED RELEASE ORAL DAILY
Qty: 30 TABLET | Refills: 1 | Status: SHIPPED | OUTPATIENT
Start: 2020-02-17 | End: 2020-05-20

## 2020-02-17 RX ORDER — ASPIRIN 81 MG/1
81 TABLET, CHEWABLE ORAL DAILY
Qty: 30 TABLET | Refills: 1 | Status: SHIPPED | OUTPATIENT
Start: 2020-02-17 | End: 2020-05-20

## 2020-02-17 RX ORDER — TRAZODONE HYDROCHLORIDE 50 MG/1
50 TABLET ORAL EVERY EVENING
Qty: 30 TABLET | Refills: 1 | Status: SHIPPED | OUTPATIENT
Start: 2020-02-17 | End: 2020-05-20

## 2020-02-21 ENCOUNTER — TELEPHONE (OUTPATIENT)
Dept: INTERNAL MEDICINE | Facility: CLINIC | Age: 70
End: 2020-02-21

## 2020-02-21 ENCOUNTER — OFFICE VISIT (OUTPATIENT)
Dept: INTERNAL MEDICINE | Facility: CLINIC | Age: 70
End: 2020-02-21

## 2020-02-21 VITALS
WEIGHT: 168 LBS | HEART RATE: 98 BPM | TEMPERATURE: 97.6 F | DIASTOLIC BLOOD PRESSURE: 66 MMHG | HEIGHT: 68 IN | BODY MASS INDEX: 25.46 KG/M2 | SYSTOLIC BLOOD PRESSURE: 110 MMHG | OXYGEN SATURATION: 96 %

## 2020-02-21 DIAGNOSIS — J96.12 CHRONIC RESPIRATORY FAILURE WITH HYPOXIA AND HYPERCAPNIA (HCC): ICD-10-CM

## 2020-02-21 DIAGNOSIS — I50.32 CHRONIC DIASTOLIC CONGESTIVE HEART FAILURE (HCC): ICD-10-CM

## 2020-02-21 DIAGNOSIS — J96.11 CHRONIC RESPIRATORY FAILURE WITH HYPOXIA AND HYPERCAPNIA (HCC): ICD-10-CM

## 2020-02-21 DIAGNOSIS — I48.0 PAROXYSMAL ATRIAL FIBRILLATION (HCC): ICD-10-CM

## 2020-02-21 DIAGNOSIS — J44.1 COPD WITH ACUTE EXACERBATION (HCC): Primary | ICD-10-CM

## 2020-02-21 PROCEDURE — 99214 OFFICE O/P EST MOD 30 MIN: CPT | Performed by: NURSE PRACTITIONER

## 2020-02-21 PROCEDURE — 96372 THER/PROPH/DIAG INJ SC/IM: CPT | Performed by: NURSE PRACTITIONER

## 2020-02-21 RX ORDER — PREDNISONE 20 MG/1
20 TABLET ORAL DAILY
Qty: 7 TABLET | Refills: 0 | Status: SHIPPED | OUTPATIENT
Start: 2020-02-21 | End: 2020-02-28

## 2020-02-21 RX ORDER — METHYLPREDNISOLONE ACETATE 40 MG/ML
40 INJECTION, SUSPENSION INTRA-ARTICULAR; INTRALESIONAL; INTRAMUSCULAR; SOFT TISSUE ONCE
Status: COMPLETED | OUTPATIENT
Start: 2020-02-21 | End: 2020-02-21

## 2020-02-21 RX ORDER — METHYLPREDNISOLONE ACETATE 80 MG/ML
40 INJECTION, SUSPENSION INTRA-ARTICULAR; INTRALESIONAL; INTRAMUSCULAR; SOFT TISSUE ONCE
Status: DISCONTINUED | OUTPATIENT
Start: 2020-02-21 | End: 2020-02-21

## 2020-02-21 RX ORDER — DOXYCYCLINE HYCLATE 100 MG/1
100 CAPSULE ORAL 2 TIMES DAILY
Qty: 20 CAPSULE | Refills: 0 | Status: SHIPPED | OUTPATIENT
Start: 2020-02-21 | End: 2020-02-21

## 2020-02-21 RX ORDER — PREDNISONE 20 MG/1
20 TABLET ORAL DAILY
Qty: 7 TABLET | Refills: 0 | Status: SHIPPED | OUTPATIENT
Start: 2020-02-21 | End: 2020-02-21

## 2020-02-21 RX ORDER — DOXYCYCLINE HYCLATE 100 MG/1
100 CAPSULE ORAL 2 TIMES DAILY
Qty: 20 CAPSULE | Refills: 0 | Status: SHIPPED | OUTPATIENT
Start: 2020-02-21 | End: 2020-03-02

## 2020-02-21 RX ADMIN — METHYLPREDNISOLONE ACETATE 40 MG: 40 INJECTION, SUSPENSION INTRA-ARTICULAR; INTRALESIONAL; INTRAMUSCULAR; SOFT TISSUE at 15:39

## 2020-02-21 NOTE — TELEPHONE ENCOUNTER
Called, discussed covered medications with pharmacy.  They are located in [place], another delivery will most likely not be made until Monday.  Patient has rec'd antibiotic and prednisone.  He stated he does not want another inhaler, as he has plenty of Brio.

## 2020-02-21 NOTE — TELEPHONE ENCOUNTER
[facility] WOULD LIKE TO REQUEST AN ORDER FOR PT,OT,AND ST.     PLEASE ADVISE LAMONT -[phone].

## 2020-02-21 NOTE — TELEPHONE ENCOUNTER
Called Roxana back and advised her that it would be okay for an order, gave her our fax number so she can fax orders over.

## 2020-02-21 NOTE — PROGRESS NOTES
"Date: 2020    Name: Jani Pena  : 1950    Chief Complaint:   Chief Complaint   Patient presents with   • Cough     rattle sounds in lungs per [facility]       HPI:  Jani Pena is a 69 y.o. male presents with increased cough, referred by [employer] RN due to \"rattle\" in lungs & increased wheezing for 3-4 days.  His cough has been productive of yellow-green sputum.  He has significant h/o COPD, a fib, heart failure.  Denies fever, chills, increased wheezing or fatigue, palpitations, weakness, syncope, orthopnea, decreased appetite, chills, fever, sore throat, headache, dizziness, increased swelling of lower legs. Most recent COPD exacerbation was a month ago.  He is currently on 3 L O2 per nasal cannula, 4 L with exertion.  Using walker for ambulation today.      History:  The following portions of the patient's history were reviewed and updated as appropriate: allergies, current medications, past medical history, family history, surgical history, social history and problem list.     ROS:  Review of Systems   Constitutional: Negative for chills and fever.   HENT: Positive for sore throat. Negative for congestion, trouble swallowing and voice change.    Eyes: Negative.    Cardiovascular: Negative for chest pain.       VS:  Vitals:    20 1415   BP: 110/66   Pulse: 98   Temp: 97.6 °F (36.4 °C)   TempSrc: Temporal   SpO2: 96%   Weight: 76.2 kg (168 lb)   Height: 172.7 cm (67.99\")     Body mass index is 25.55 kg/m².    PE:  Physical Exam   Constitutional: He is oriented to person, place, and time. He appears well-developed and well-nourished. He appears ill (chronically).   HENT:   Head: Normocephalic.   Mouth/Throat: Oropharynx is clear and moist.   Eyes: Pupils are equal, round, and reactive to light. Conjunctivae are normal.   Neck: Normal range of motion. Neck supple.   Cardiovascular: Normal rate, regular rhythm, normal heart sounds and intact distal pulses.   Pulmonary/Chest: " Effort normal. He has wheezes. He has rhonchi.   Neurological: He is alert and oriented to person, place, and time.   Skin: Skin is warm. Capillary refill takes 2 to 3 seconds.       Assessment/Plan:  Jani was seen today for cough.    Diagnoses and all orders for this visit:    COPD with acute exacerbation (CMS/HCC)  -     Fluticasone-Umeclidin-Vilant 100-62.5-25 MCG/INH aerosol powder ; Inhale 2 puff Daily. Stop using brio after obtaining new inhaler    -     Ambulatory Referral to Pulmonology  -     methylPREDNISolone acetate (DEPO-medrol) injection 40 mg  -     doxycycline (VIBRAMYCIN) 100 MG capsule; Take 1 capsule by mouth 2 (Two) Times a Day for 10 days.  -     predniSONE (DELTASONE) 20 MG tablet; Take 1 tablet by mouth Daily for 7 days.    Chronic respiratory failure with hypoxia and hypercapnia (CMS/HCC)  -     Ambulatory Referral to Pulmonology    Paroxysmal atrial fibrillation (CMS/HCC)  Chronic diastolic congestive heart failure (CMS/HCC)        - Continue taking medications as prescribed.        Return in about 4 weeks (around 3/20/2020) for Next scheduled follow up.

## 2020-02-21 NOTE — TELEPHONE ENCOUNTER
Yoana from [facility] in [place] called and stated that the Fluticasone-Umeclidin-Vilant 100-62.5-25 MCG/INH aerosol powder  Is not covered by insurance. There was several refers to change the med. To something that is covered by his insurance.    Yoana call back [phone]    Pharmacy [facility]    Please advise

## 2020-03-03 NOTE — PROGRESS NOTES
[facility]  Office Progress Note  Jani Pena  1950  [address] [facility] [address]       Visit Date: 03/04/20    PCP: Miguel Rojas MD  [address]  [facility] [address]    IDENTIFICATION: A 69 y.o. male  resident of [place], [place].    PROBLEM LIST:   1. CAD  1. 1994, 2008- [facility] nonobstr  2. 2011 SE wnl  3. 5/18 nonobst cad   2. Abnl Echo  1. 2013 EF 45 w BBB  2. 1/3/18 echo: EF 45-50%, moderate LA enlargement, mitral thickening with mild MR, sclerosed aortic valve with mild AI, mild TR, apical segmental hypokinesis  3. CVD  1. B CEA -[facility] remote  2. CUS [facility] 2014 nonobst  4. HTN  5. Remote renal artery stent  6. HL  1. 6/14 137/149/42/65  7. SSS  1. 1/31/19 [facility]  8. CKD 3  9. PEDRO  10. COPD  11. PFO closure Dr Alves       Chief Complaint   Patient presents with   • Coronary Artery Disease       Allergies  Allergies   Allergen Reactions   • Sulfa Antibiotics Nausea And Vomiting       Current Medications    Current Outpatient Medications:   •  alfuzosin (UROXATRAL) 10 MG 24 hr tablet, Take 1 tablet by mouth Daily., Disp: 30 tablet, Rfl: 1  •  aspirin (ASPIRIN LOW DOSE) 81 MG chewable tablet, Chew 1 tablet Daily., Disp: 30 tablet, Rfl: 1  •  atorvastatin (LIPITOR) 10 MG tablet, Take 1 tablet by mouth Every Night., Disp: 30 tablet, Rfl: 2  •  BREO ELLIPTA 200-25 MCG/INH inhaler, 2 puffs Daily., Disp: , Rfl:   •  carboxymethylcellulose (REFRESH PLUS) 0.5 % solution, Administer 1 drop to both eyes 3 (Three) Times a Day As Needed for Dry Eyes., Disp: 1 bottle, Rfl: 0  •  clopidogrel (PLAVIX) 75 MG tablet, Take 1 tablet by mouth Daily., Disp: 30 tablet, Rfl: 2  •  cyclobenzaprine (FLEXERIL) 5 MG tablet, Take 1 tablet by mouth Every 8 (Eight) Hours., Disp: 30 tablet, Rfl: 2  •  docusate sodium 100 MG capsule, Take 100 mg by mouth 2 (Two) Times a Day., Disp: 30 each, Rfl: 2  •  escitalopram (LEXAPRO) 20 MG tablet, Take 1 tablet by mouth  "Daily., Disp: 30 tablet, Rfl: 2  •  finasteride (PROSCAR) 5 MG tablet, Take 1 tablet by mouth Daily., Disp: 30 tablet, Rfl: 2  •  Fluticasone-Umeclidin-Vilant 100-62.5-25 MCG/INH aerosol powder , Inhale 1 puff Daily., Disp: 28 each, Rfl: 1  •  furosemide (LASIX) 20 MG tablet, HOLD LASIX UNTIL FURTHER NOTICE, Disp: 30 tablet, Rfl: 2  •  ipratropium-albuterol (DUO-NEB) 0.5-2.5 mg/mL nebulizer, Take 3 mL by nebulization Every 6 (Six) Hours., Disp: 360 mL, Rfl:   •  lidocaine (LIDODERM) 5 %, Place 1 patch on the skin as directed by provider Daily. Remove & Discard patch within 12 hours or as directed by MD, Disp: , Rfl:   •  lisinopril (PRINIVIL,ZESTRIL) 5 MG tablet, Take 1 tablet by mouth Daily., Disp: 30 tablet, Rfl: 2  •  melatonin 5 MG tablet tablet, Take 1 tablet by mouth Every Night., Disp: 30 tablet, Rfl: 2  •  mirtazapine (REMERON) 15 MG tablet, Take 1 tablet by mouth Every Night., Disp: 30 tablet, Rfl: 2  •  O2 (OXYGEN), Inhale 4 L/min 1 (One) Time. Walking on 4L and sitting 3L, Disp: , Rfl:   •  oxybutynin (DITROPAN) 5 MG tablet, Take 1 tablet by mouth Every Night., Disp: 30 tablet, Rfl: 2  •  pantoprazole (PROTONIX) 40 MG EC tablet, Take 1 tablet by mouth Daily., Disp: 30 tablet, Rfl: 2  •  traZODone (DESYREL) 50 MG tablet, Take 1 tablet by mouth Every Evening., Disp: 30 tablet, Rfl: 1      History of Present Illness   Jani Pena is a 69 y.o. year old male here for follow up.  Baseline end-stage COPD he is remained out of the hospital since October of this past year he remains in assisted living  .      OBJECTIVE:  Vitals:    03/04/20 1402   Pulse: 100   SpO2: 91%   Weight: 75.8 kg (167 lb)   Height: 172.7 cm (68\")     Physical Exam   Constitutional: He appears well-developed and well-nourished.   Chronic O2   Neck: Normal range of motion. Neck supple. No hepatojugular reflux and no JVD present. Carotid bruit is not present. No tracheal deviation present. No thyromegaly present.   Cardiovascular: " Normal rate, regular rhythm, S1 normal, S2 normal, intact distal pulses and normal pulses. PMI is not displaced. Exam reveals no gallop, no distant heart sounds, no friction rub, no midsystolic click and no opening snap.   No murmur heard.  Pulses:       Radial pulses are 2+ on the right side, and 2+ on the left side.        Dorsalis pedis pulses are 2+ on the right side, and 2+ on the left side.        Posterior tibial pulses are 2+ on the right side, and 2+ on the left side.   Pulmonary/Chest: Effort normal and breath sounds normal. He has no wheezes. He has no rales.   Prolonged expiratory phase with diminished breath sounds   Abdominal: Soft. Bowel sounds are normal. He exhibits no mass. There is no tenderness. There is no guarding.       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 Coronary artery disease involving native coronary artery of native heart without angina pectoris     2. AV block, Mobitz II     3. Essential hypertension     4. Mixed hyperlipidemia         PLAN:  CAD historically nonobstructive continue medical management    AV block post pacemaker acceptable function continue monitoring    Hypertension acceptable control lisinopril    Dyslipidemia on statin therapy    End-stage COPD oxygen dependent counseled regarding avoidance of infectious environments    Miguel Rojas MD, thank you for referring Mr. Pena for evaluation.  I have forwarded my electronically generated recommendations to you for review.  Please do not hesitate to call with any questions.      Ulysses Bass MD, [facility]

## 2020-03-04 ENCOUNTER — OFFICE VISIT (OUTPATIENT)
Dept: CARDIOLOGY | Facility: CLINIC | Age: 70
End: 2020-03-04

## 2020-03-04 VITALS — HEART RATE: 100 BPM | HEIGHT: 68 IN | OXYGEN SATURATION: 91 % | WEIGHT: 167 LBS | BODY MASS INDEX: 25.31 KG/M2

## 2020-03-04 DIAGNOSIS — I44.1 AV BLOCK, MOBITZ II: ICD-10-CM

## 2020-03-04 DIAGNOSIS — I25.10 CORONARY ARTERY DISEASE INVOLVING NATIVE CORONARY ARTERY OF NATIVE HEART WITHOUT ANGINA PECTORIS: Primary | ICD-10-CM

## 2020-03-04 DIAGNOSIS — I10 ESSENTIAL HYPERTENSION: ICD-10-CM

## 2020-03-04 DIAGNOSIS — E78.2 MIXED HYPERLIPIDEMIA: ICD-10-CM

## 2020-03-04 PROCEDURE — 99214 OFFICE O/P EST MOD 30 MIN: CPT | Performed by: INTERNAL MEDICINE

## 2020-03-04 PROCEDURE — 93280 PM DEVICE PROGR EVAL DUAL: CPT | Performed by: INTERNAL MEDICINE

## 2020-03-11 ENCOUNTER — TELEPHONE (OUTPATIENT)
Dept: INTERNAL MEDICINE | Facility: CLINIC | Age: 70
End: 2020-03-11

## 2020-03-11 RX ORDER — LEVOFLOXACIN 500 MG/1
500 TABLET, FILM COATED ORAL DAILY
Qty: 7 TABLET | Refills: 0 | Status: SHIPPED | OUTPATIENT
Start: 2020-03-11 | End: 2020-03-25

## 2020-03-11 NOTE — TELEPHONE ENCOUNTER
Please advise? Can we send in an ABX for this pt. I did send this to Dr. Rojas but am also sending this to you since you are on call.     Pt is allergic to sulfa ABX.

## 2020-03-11 NOTE — TELEPHONE ENCOUNTER
Yosi sent and faxed over the prescription to [facility] per Dr. Rojas's request. Please disregard sending in another ABX prescription.

## 2020-03-11 NOTE — TELEPHONE ENCOUNTER
Pt states that he is coughing and is afraid that he is going to catch Bronchitis. States he coughed all last night.    Spoke with Alee over at [facility] she stated they just issued a new policy where they could not let their residents leave the facility due to sickness exposure and the Bond Virus.     Alee suggested to see if we could send in [place] a prescription for ABX. Is this okay to do? Please advise? Jani is allergic to Sulfa ABX.

## 2020-03-11 NOTE — TELEPHONE ENCOUNTER
HANNA NUMBER - [phone]   ALIDA KRISHNA [facility] CALLED REGARDING THE PT.    THE [facility] FACILITY JUST CHANGED THEIR POLICY TO WHERE PTS. CAN NOT LEAVE AND RETURN THE FACILITY.    PTS. CHIEF OF COMPLAINT IS THAT HE IS COUGHING AND HIS NEBULIZER TREATMENTS ARE NOT HELPING TO CLEAR ANYTHING.    PT. WOULD PREFER THAT DOCTOR YIN CAME INTO SEE HIM. IF NOT THAT HE WAS PRESCRIBED AN ANTIBIOTIC.    PT. IS ALLERGIC TO SULFA ANTIBIOTICS

## 2020-03-13 ENCOUNTER — TELEPHONE (OUTPATIENT)
Dept: INTERNAL MEDICINE | Facility: CLINIC | Age: 70
End: 2020-03-13

## 2020-03-16 ENCOUNTER — TELEPHONE (OUTPATIENT)
Dept: INTERNAL MEDICINE | Facility: CLINIC | Age: 70
End: 2020-03-16

## 2020-03-16 RX ORDER — CEPHALEXIN 500 MG/1
500 CAPSULE ORAL 3 TIMES DAILY
Qty: 30 CAPSULE | Refills: 0 | Status: SHIPPED | OUTPATIENT
Start: 2020-03-16 | End: 2020-03-20 | Stop reason: SDUPTHER

## 2020-03-16 NOTE — TELEPHONE ENCOUNTER
Nahomi from [facility] stated that Dr. Rojas put him on Levaquin.  He has stopped this because he developed a rash and needs another one called in.  She also wanted to know if he could start Tamiflu if he starting having symptoms.  She can be reached at [phone]    [facility]

## 2020-03-16 NOTE — TELEPHONE ENCOUNTER
Stop Levaquin  Add Levaquin to allergy list on the computer  Please check with the  nurse and find out what symptoms the patient is having and if the patient is febrile

## 2020-03-18 ENCOUNTER — TELEPHONE (OUTPATIENT)
Dept: INTERNAL MEDICINE | Facility: CLINIC | Age: 70
End: 2020-03-18

## 2020-03-18 RX ORDER — LIDOCAINE 50 MG/G
PATCH TOPICAL
Qty: 30 PATCH | Refills: 3 | Status: SHIPPED | OUTPATIENT
Start: 2020-03-18 | End: 2020-06-10

## 2020-03-18 NOTE — TELEPHONE ENCOUNTER
[facility] is calling stating that the patient still having itching in his leg real bad and was seeing if we could call in hydrocortisone cream or benadryl cream.please advise    Call back # [phone]   confirmed pharmacy

## 2020-03-18 NOTE — TELEPHONE ENCOUNTER
Would this be okay to do? Please advise? Prescription needs sent to [facility].

## 2020-03-19 ENCOUNTER — TELEPHONE (OUTPATIENT)
Dept: INTERNAL MEDICINE | Facility: CLINIC | Age: 70
End: 2020-03-19

## 2020-03-19 RX ORDER — DIAPER,BRIEF,INFANT-TODD,DISP
EACH MISCELLANEOUS AS NEEDED
Qty: 1 EACH | Refills: 0 | Status: SHIPPED | OUTPATIENT
Start: 2020-03-19 | End: 2020-05-29

## 2020-03-19 NOTE — TELEPHONE ENCOUNTER
Patient who lives in the assisted living [facility] called to state that he has been having itching and a rash  for a week. I have recommended that he see Dr. Rojas in the morning tomorrow as his symptoms have persisted and are worsening, so please call him in the morning and give him an appointment to be seen and call patient at [phone]

## 2020-03-20 ENCOUNTER — TELEPHONE (OUTPATIENT)
Dept: INTERNAL MEDICINE | Facility: CLINIC | Age: 70
End: 2020-03-20

## 2020-03-20 RX ORDER — DIAPER,BRIEF,INFANT-TODD,DISP
EACH MISCELLANEOUS 2 TIMES DAILY
Qty: 1 EACH | Refills: 3 | Status: SHIPPED | OUTPATIENT
Start: 2020-03-20 | End: 2020-01-01

## 2020-03-20 RX ORDER — CEPHALEXIN 500 MG/1
500 CAPSULE ORAL 3 TIMES DAILY
Qty: 30 CAPSULE | Refills: 0 | Status: SHIPPED | OUTPATIENT
Start: 2020-03-20 | End: 2020-03-30

## 2020-03-20 NOTE — TELEPHONE ENCOUNTER
Per Dr. Rojas he asked if Dr. Enrique go and see the patient due to our policy Dr. Rojas is not credentialed to go to the [facility]. Jeanette did call over at [facility] and talked with Joslyn to see if Dr. Enrique can go over to [facility] to see the patient. Joslyn advised Jeanette that she would send her an email to see if this was something that could be done.

## 2020-03-20 NOTE — TELEPHONE ENCOUNTER
I have faxed over prescriptions to [facility] to help with his rash per Dr. Rojas, Patient is not allowed to leave [facility] due to them being on lockdown for their residents during this time.

## 2020-03-20 NOTE — TELEPHONE ENCOUNTER
DANIEL FROM [facility] CALLED STATING THE PT HAS A RASH THAT HASN'T GONE AWAY.  SHE STATED THAT IS HE WAS TO GO OUT OF THE FACILITY HE WOULD HAVE TO BE QUARANTINE FOR 14 DAYS IN HIS ROOM.  SHE ASKED IF DR CHOUDHARY COULD COME AND SEE HIM AT THE FACILITY.      DANIEL'S CONTACT  [phone]      PLEASE ADVISE

## 2020-03-20 NOTE — TELEPHONE ENCOUNTER
Faxed over a script stating for [facility] to hold pt's Keflex. Also Harish has been notified from [facility] to hold this.

## 2020-03-20 NOTE — TELEPHONE ENCOUNTER
Levaquin was switched to Keflex due to rash and itching. The rash or itching has not gotten any better since changing to Keflex. [facility] has held it this morning. Should they continue to hold the Keflex until Monday when Dr Villalta can see the patient?    The medications that are ordeerd needs to be sent to [facility].

## 2020-03-23 ENCOUNTER — TELEPHONE (OUTPATIENT)
Dept: INTERNAL MEDICINE | Facility: CLINIC | Age: 70
End: 2020-03-23

## 2020-03-23 NOTE — TELEPHONE ENCOUNTER
Mylene from [facility] called requesting a call back [phone]    Please advise

## 2020-03-25 RX ORDER — PNV NO.95/FERROUS FUM/FOLIC AC 28MG-0.8MG
TABLET ORAL
Qty: 30 TABLET | Refills: 0 | Status: SHIPPED | OUTPATIENT
Start: 2020-03-25 | End: 2020-03-31 | Stop reason: SDUPTHER

## 2020-03-27 RX ORDER — CAMPHOR 0.45 %
GEL (GRAM) TOPICAL
Qty: 28.3 G | Refills: 5 | Status: SHIPPED | OUTPATIENT
Start: 2020-03-27 | End: 2020-01-01

## 2020-03-31 RX ORDER — PNV NO.95/FERROUS FUM/FOLIC AC 28MG-0.8MG
1 TABLET ORAL DAILY
Qty: 30 TABLET | Refills: 2 | Status: SHIPPED | OUTPATIENT
Start: 2020-03-31 | End: 2020-05-04

## 2020-04-03 ENCOUNTER — OFFICE VISIT (OUTPATIENT)
Dept: INTERNAL MEDICINE | Facility: CLINIC | Age: 70
End: 2020-04-03

## 2020-04-03 ENCOUNTER — TELEPHONE (OUTPATIENT)
Dept: INTERNAL MEDICINE | Facility: CLINIC | Age: 70
End: 2020-04-03

## 2020-04-03 DIAGNOSIS — L30.9 ECZEMA, UNSPECIFIED TYPE: Primary | ICD-10-CM

## 2020-04-03 DIAGNOSIS — L85.3 XEROSIS OF SKIN: ICD-10-CM

## 2020-04-03 PROCEDURE — 99213 OFFICE O/P EST LOW 20 MIN: CPT | Performed by: INTERNAL MEDICINE

## 2020-04-03 NOTE — TELEPHONE ENCOUNTER
UNABLE TO WARM TRANSFER, PT CALLED NEEDS AN APPOINTMENT FOR AN RASH HE HAS ALL OVER.     CALLBACK # [phone].

## 2020-04-03 NOTE — PROGRESS NOTES
You have chosen to receive care through a telephone visit today. Do you consent to use a telephone visit for your medical care today? Yes    Telephone visit was discussed with  Jaylen Ochoa Yin MD Thurman Parsons     Patient complains rash in arms and legs for more than a month.  Itchy scratchy rash is macular scaly varying in size from 1 mm to 3 mm.  A few in the abdomen otherwise no rash in the trunk or head neck.  Patient states rash first started after taking Levaquin and then after discontinuation of Levaquin rash is no better.  Benadryl cream helps some but does not resolve the rash.  Denies any fever chills.  Patient does have dry skin in a nursing home now.    Review of systems rash itchy no fever chills    From the picture patient sent showed scaly macular rash very dry skin    Assessment and plan    xerosis and eczema    Discussed with patient's nurse recommend starting Medrol Dosepak and a vigorous Vaseline lotion usage needed.  Patient knows to call if no better    Total time spent on the phone and with nurse 15 minutes    This visit has been rescheduled as a phone visit to comply with patient safety concerns in accordance with CDC recommendations. Total time of discussion was 15 minutes.

## 2020-04-20 ENCOUNTER — TELEPHONE (OUTPATIENT)
Dept: INTERNAL MEDICINE | Facility: CLINIC | Age: 70
End: 2020-04-20

## 2020-04-20 NOTE — TELEPHONE ENCOUNTER
I spoke with Mylene - they are not set up to do a video visit. If Jani leaves the facility he will be quarantined for 14 days.    Do you want him to come in tomorrow?

## 2020-04-20 NOTE — TELEPHONE ENCOUNTER
LEIDA CALLED STATING THAT THE PT STILL HAS A RASH AFTER TAKING MEDICATION FOR IT.  LEIDA ASKED IF HE NEEDED TO BE SEEN.    LEIDA'S CONTACT [phone]    PLEASE ADVISE

## 2020-04-20 NOTE — TELEPHONE ENCOUNTER
Jani has a flip phone and can not do a video visit. He says the rash is the same it was a month ago. He says he can send pics to his [email or web address] account but is not able to do video

## 2020-04-21 ENCOUNTER — TELEPHONE (OUTPATIENT)
Dept: INTERNAL MEDICINE | Facility: CLINIC | Age: 70
End: 2020-04-21

## 2020-04-21 ENCOUNTER — TELEMEDICINE (OUTPATIENT)
Dept: INTERNAL MEDICINE | Facility: CLINIC | Age: 70
End: 2020-04-21

## 2020-04-21 DIAGNOSIS — L85.3 DRY SKIN DERMATITIS: Primary | ICD-10-CM

## 2020-04-21 PROCEDURE — 99214 OFFICE O/P EST MOD 30 MIN: CPT | Performed by: INTERNAL MEDICINE

## 2020-04-21 RX ORDER — IPRATROPIUM BROMIDE AND ALBUTEROL SULFATE 2.5; .5 MG/3ML; MG/3ML
SOLUTION RESPIRATORY (INHALATION)
Qty: 360 ML | Refills: 2 | Status: SHIPPED | OUTPATIENT
Start: 2020-04-21 | End: 2020-07-23

## 2020-04-21 NOTE — PROGRESS NOTES
You have chosen to receive care through a telehealth visit.  Do you consent to use a video/audio connection for your medical care today? YES     Subjective   Jani Pena is a 69 y.o. male.     No chief complaint on file.      History of Present Illness   Patient here for video visit for rash in the arms and legs.  Patient has a nursing home resident reported diagnosis from the nursing home stating patient had a groin rash thought to be related to infection per staff.  Several rounds of antibiotics tried rash is no better.  Today is the first time we had a face-to-face or video with patient patient reports actually is a rash mainly in the arms and legs maculopapular itchy denies any pain.  No tenderness.  Patient does use hot water for shower.  Lotion so far no significant help    Current Outpatient Medications:   •  alfuzosin (UROXATRAL) 10 MG 24 hr tablet, Take 1 tablet by mouth Daily., Disp: 30 tablet, Rfl: 1  •  aspirin (ASPIRIN LOW DOSE) 81 MG chewable tablet, Chew 1 tablet Daily., Disp: 30 tablet, Rfl: 1  •  atorvastatin (LIPITOR) 10 MG tablet, Take 1 tablet by mouth Every Night., Disp: 30 tablet, Rfl: 2  •  BENADRYL ITCH STOPPING 1-0.1 % cream, APPLY TOPICALLY TO AFFECTED AREA THREE TIMES A DAY AS NEEDED FOR ITCHING, Disp: 28.3 g, Rfl: 5  •  BREO ELLIPTA 200-25 MCG/INH inhaler, 2 puffs Daily., Disp: , Rfl:   •  carboxymethylcellulose (REFRESH PLUS) 0.5 % solution, Administer 1 drop to both eyes 3 (Three) Times a Day As Needed for Dry Eyes., Disp: 1 bottle, Rfl: 0  •  clopidogrel (PLAVIX) 75 MG tablet, Take 1 tablet by mouth Daily., Disp: 30 tablet, Rfl: 2  •  cyclobenzaprine (FLEXERIL) 5 MG tablet, Take 1 tablet by mouth Every 8 (Eight) Hours., Disp: 30 tablet, Rfl: 2  •  diphenhydrAMINE (BENADRYL) 2 % cream, Apply  topically to the appropriate area as directed 3 (Three) Times a Day As Needed for Itching., Disp: 15 g, Rfl: 0  •  diphenhydrAMINE (BENADRYL) 2 % cream, Apply  topically to the appropriate  area as directed 3 (Three) Times a Day As Needed for Itching., Disp: 15 g, Rfl: 3  •  docusate sodium 100 MG capsule, Take 100 mg by mouth 2 (Two) Times a Day., Disp: 30 each, Rfl: 2  •  escitalopram (LEXAPRO) 20 MG tablet, Take 1 tablet by mouth Daily., Disp: 30 tablet, Rfl: 2  •  ferrous sulfate 325 (65 Fe) MG tablet, Take 1 tablet by mouth Daily., Disp: 30 tablet, Rfl: 2  •  finasteride (PROSCAR) 5 MG tablet, Take 1 tablet by mouth Daily., Disp: 30 tablet, Rfl: 2  •  Fluticasone-Umeclidin-Vilant 100-62.5-25 MCG/INH aerosol powder , Inhale 1 puff Daily., Disp: 28 each, Rfl: 1  •  furosemide (LASIX) 20 MG tablet, HOLD LASIX UNTIL FURTHER NOTICE, Disp: 30 tablet, Rfl: 2  •  hydrocortisone 1 % cream, Apply  topically to the appropriate area as directed As Needed (Itching)., Disp: 1 each, Rfl: 0  •  hydrocortisone 1 % cream, Apply  topically to the appropriate area as directed 2 (Two) Times a Day., Disp: 1 each, Rfl: 3  •  ipratropium-albuterol (DUO-NEB) 0.5-2.5 mg/3 ml nebulizer, INHALE 1 VIAL PER NEBULIZER EVERY 6 HOURS, Disp: 360 mL, Rfl: 2  •  lidocaine (LIDODERM) 5 %, PLACE ONE PATCH ON SKIN AS DIRECTED,REMOVE & DISCARD PATCH WITHIN 12 HOURS, Disp: 30 patch, Rfl: 3  •  lisinopril (PRINIVIL,ZESTRIL) 5 MG tablet, Take 1 tablet by mouth Daily., Disp: 30 tablet, Rfl: 2  •  melatonin 5 MG tablet tablet, Take 1 tablet by mouth Every Night., Disp: 30 tablet, Rfl: 2  •  mirtazapine (REMERON) 15 MG tablet, Take 1 tablet by mouth Every Night., Disp: 30 tablet, Rfl: 2  •  O2 (OXYGEN), Inhale 4 L/min 1 (One) Time. Walking on 4L and sitting 3L, Disp: , Rfl:   •  oxybutynin (DITROPAN) 5 MG tablet, Take 1 tablet by mouth Every Night., Disp: 30 tablet, Rfl: 2  •  pantoprazole (PROTONIX) 40 MG EC tablet, Take 1 tablet by mouth Daily., Disp: 30 tablet, Rfl: 2  •  traZODone (DESYREL) 50 MG tablet, Take 1 tablet by mouth Every Evening., Disp: 30 tablet, Rfl: 1    The following portions of the patient's history were reviewed and  updated as appropriate: allergies, current medications, past family history, past medical history, past social history, past surgical history and problem list.    Review of Systems   Constitutional: Negative.    Respiratory: Negative.    Cardiovascular: Negative.    Gastrointestinal: Negative.    Musculoskeletal: Negative.    Skin: Positive for rash.   Neurological: Negative.    Psychiatric/Behavioral: Negative.        Objective   Physical Exam   Constitutional: He is oriented to person, place, and time. He appears well-developed and well-nourished.   Neurological: He is alert and oriented to person, place, and time.   Skin: Rash ( Through the camera showed patient is a rash mainly maculopapular different in sizes distributed mainly in all legs sobbing arms.) noted.   Psychiatric: He has a normal mood and affect.       All tests have been reviewed.    Assessment/Plan   Diagnoses and all orders for this visit:    Dry skin dermatitis      Need Vaseline lotion, lukewarm water shower, if possible humidify, initiate triamcinolone.  Patient knows to call if no better

## 2020-04-21 NOTE — TELEPHONE ENCOUNTER
Patient is working with [facility] to get a tablet to use for a video visit. He will call back one that is set

## 2020-04-21 NOTE — TELEPHONE ENCOUNTER
BRAVO WITH [facility] IS REQUESTING CLARIFICATION FOR     fluocinonide-emollient (LIDEX-E) 0.05 % cream    PHARMACY CALL BACK [phone]  EXT. 4

## 2020-04-21 NOTE — TELEPHONE ENCOUNTER
Left detailed voice mail requesting a call back.     Dr. Rojas wants to do a video visit with him. We can do it on his ipad or smart phone

## 2020-04-23 RX ORDER — ESCITALOPRAM OXALATE 20 MG/1
20 TABLET ORAL DAILY
Qty: 90 TABLET | Refills: 3 | Status: SHIPPED | OUTPATIENT
Start: 2020-04-23 | End: 2020-01-01

## 2020-04-23 RX ORDER — CYCLOBENZAPRINE HCL 5 MG
5 TABLET ORAL 3 TIMES DAILY PRN
Qty: 90 TABLET | Refills: 3 | Status: SHIPPED | OUTPATIENT
Start: 2020-04-23 | End: 2020-05-29 | Stop reason: SDUPTHER

## 2020-04-23 RX ORDER — OXYBUTYNIN CHLORIDE 5 MG/1
TABLET ORAL
Qty: 90 TABLET | Refills: 3 | Status: SHIPPED | OUTPATIENT
Start: 2020-04-23 | End: 2021-01-01

## 2020-04-23 RX ORDER — CLOPIDOGREL BISULFATE 75 MG/1
TABLET ORAL
Qty: 90 TABLET | Refills: 3 | Status: SHIPPED | OUTPATIENT
Start: 2020-04-23 | End: 2021-01-01

## 2020-04-23 RX ORDER — DOCUSATE SODIUM 100 MG/1
100 CAPSULE, LIQUID FILLED ORAL 2 TIMES DAILY
Qty: 180 CAPSULE | Refills: 3 | Status: SHIPPED | OUTPATIENT
Start: 2020-04-23 | End: 2020-01-01

## 2020-04-23 RX ORDER — ATORVASTATIN CALCIUM 10 MG/1
10 TABLET, FILM COATED ORAL EVERY EVENING
Qty: 90 TABLET | Refills: 3 | Status: SHIPPED | OUTPATIENT
Start: 2020-04-23 | End: 2021-01-01

## 2020-04-23 RX ORDER — CHOLECALCIFEROL (VITAMIN D3) 125 MCG
CAPSULE ORAL
Qty: 28 TABLET | Refills: 5 | Status: SHIPPED | OUTPATIENT
Start: 2020-04-23 | End: 2020-01-01

## 2020-04-23 RX ORDER — LISINOPRIL 5 MG/1
TABLET ORAL
Qty: 90 TABLET | Refills: 3 | Status: SHIPPED | OUTPATIENT
Start: 2020-04-23 | End: 2020-01-01

## 2020-04-23 RX ORDER — FINASTERIDE 5 MG/1
TABLET, FILM COATED ORAL
Qty: 90 TABLET | Refills: 3 | Status: SHIPPED | OUTPATIENT
Start: 2020-04-23 | End: 2020-01-01

## 2020-04-23 RX ORDER — PANTOPRAZOLE SODIUM 40 MG/1
TABLET, DELAYED RELEASE ORAL
Qty: 28 TABLET | Refills: 5 | Status: SHIPPED | OUTPATIENT
Start: 2020-04-23 | End: 2020-01-01

## 2020-04-30 ENCOUNTER — TELEMEDICINE (OUTPATIENT)
Dept: INTERNAL MEDICINE | Facility: CLINIC | Age: 70
End: 2020-04-30

## 2020-04-30 ENCOUNTER — TELEPHONE (OUTPATIENT)
Dept: INTERNAL MEDICINE | Facility: CLINIC | Age: 70
End: 2020-04-30

## 2020-04-30 DIAGNOSIS — J44.1 COPD WITH EXACERBATION (HCC): ICD-10-CM

## 2020-04-30 DIAGNOSIS — J44.9 STEROID-DEPENDENT COPD (HCC): Primary | ICD-10-CM

## 2020-04-30 PROCEDURE — 99214 OFFICE O/P EST MOD 30 MIN: CPT | Performed by: INTERNAL MEDICINE

## 2020-04-30 RX ORDER — METHYLPREDNISOLONE 4 MG/1
TABLET ORAL
Qty: 21 EACH | Refills: 0 | Status: SHIPPED | OUTPATIENT
Start: 2020-04-30 | End: 2020-05-18

## 2020-04-30 RX ORDER — DOXYCYCLINE HYCLATE 100 MG/1
100 CAPSULE ORAL 2 TIMES DAILY
Qty: 20 CAPSULE | Refills: 0 | Status: SHIPPED | OUTPATIENT
Start: 2020-04-30 | End: 2020-05-04 | Stop reason: SDUPTHER

## 2020-04-30 RX ORDER — IPRATROPIUM BROMIDE 42 UG/1
2 SPRAY, METERED NASAL 4 TIMES DAILY
Qty: 15 ML | Refills: 1 | Status: SHIPPED | OUTPATIENT
Start: 2020-04-30 | End: 2021-01-01 | Stop reason: HOSPADM

## 2020-04-30 NOTE — PROGRESS NOTES
Subjective   Jani Pena is a 69 y.o. male.     Chief Complaint   Patient presents with   • Cough       History of Present Illness   3 days cough dry cough yellow green color scant , achy, wheeze, no fever chill, soa, fatigue and no energy, On O2 4.0 L O2 sat 91%, some ache in joints. , no change in taste or smell , no Diarrhea, mild nausea. No exposure to covid. Sx is similar to the one's patient had in the past. Also has running nose    Current Outpatient Medications:   •  alfuzosin (UROXATRAL) 10 MG 24 hr tablet, Take 1 tablet by mouth Daily., Disp: 30 tablet, Rfl: 1  •  aspirin (ASPIRIN LOW DOSE) 81 MG chewable tablet, Chew 1 tablet Daily., Disp: 30 tablet, Rfl: 1  •  atorvastatin (LIPITOR) 10 MG tablet, Take 1 tablet by mouth Every Evening., Disp: 90 tablet, Rfl: 3  •  BENADRYL ITCH STOPPING 1-0.1 % cream, APPLY TOPICALLY TO AFFECTED AREA THREE TIMES A DAY AS NEEDED FOR ITCHING, Disp: 28.3 g, Rfl: 5  •  BREO ELLIPTA 200-25 MCG/INH inhaler, 2 puffs Daily., Disp: , Rfl:   •  carboxymethylcellulose (REFRESH PLUS) 0.5 % solution, Administer 1 drop to both eyes 3 (Three) Times a Day As Needed for Dry Eyes., Disp: 1 bottle, Rfl: 0  •  clopidogrel (PLAVIX) 75 MG tablet, TAKE ONE TABLET BY MOUTH DAILY, Disp: 90 tablet, Rfl: 3  •  cyclobenzaprine (FLEXERIL) 5 MG tablet, Take 1 tablet by mouth 3 (Three) Times a Day As Needed for Muscle Spasms., Disp: 90 tablet, Rfl: 3  •  diphenhydrAMINE (BENADRYL) 2 % cream, Apply  topically to the appropriate area as directed 3 (Three) Times a Day As Needed for Itching., Disp: 15 g, Rfl: 0  •  diphenhydrAMINE (BENADRYL) 2 % cream, Apply  topically to the appropriate area as directed 3 (Three) Times a Day As Needed for Itching., Disp: 15 g, Rfl: 3  •  docusate sodium (COLACE) 100 MG capsule, Take 1 capsule by mouth 2 (Two) Times a Day., Disp: 180 capsule, Rfl: 3  •  doxycycline (VIBRAMYCIN) 100 MG capsule, Take 1 capsule by mouth 2 (Two) Times a Day., Disp: 20 capsule, Rfl: 0  •   escitalopram (LEXAPRO) 20 MG tablet, Take 1 tablet by mouth Daily., Disp: 90 tablet, Rfl: 3  •  ferrous sulfate 325 (65 Fe) MG tablet, Take 1 tablet by mouth Daily., Disp: 30 tablet, Rfl: 2  •  finasteride (PROSCAR) 5 MG tablet, TAKE ONE TABLET BY MOUTH DAILY, Disp: 90 tablet, Rfl: 3  •  fluocinonide-emollient (LIDEX-E) 0.05 % cream, Apply  topically to the appropriate area as directed 2 (Two) Times a Day., Disp: 60 g, Rfl: 0  •  Fluticasone-Umeclidin-Vilant 100-62.5-25 MCG/INH aerosol powder , Inhale 1 puff Daily., Disp: 28 each, Rfl: 1  •  furosemide (LASIX) 20 MG tablet, HOLD LASIX UNTIL FURTHER NOTICE, Disp: 30 tablet, Rfl: 2  •  hydrocortisone 1 % cream, Apply  topically to the appropriate area as directed As Needed (Itching)., Disp: 1 each, Rfl: 0  •  hydrocortisone 1 % cream, Apply  topically to the appropriate area as directed 2 (Two) Times a Day., Disp: 1 each, Rfl: 3  •  ipratropium (Atrovent) 0.06 % nasal spray, 2 sprays into the nostril(s) as directed by provider 4 (Four) Times a Day., Disp: 15 mL, Rfl: 1  •  ipratropium-albuterol (DUO-NEB) 0.5-2.5 mg/3 ml nebulizer, INHALE 1 VIAL PER NEBULIZER EVERY 6 HOURS, Disp: 360 mL, Rfl: 2  •  lidocaine (LIDODERM) 5 %, PLACE ONE PATCH ON SKIN AS DIRECTED,REMOVE & DISCARD PATCH WITHIN 12 HOURS, Disp: 30 patch, Rfl: 3  •  lisinopril (PRINIVIL,ZESTRIL) 5 MG tablet, TAKE ONE TABLET BY MOUTH DAILY, Disp: 90 tablet, Rfl: 3  •  melatonin 5 MG tablet tablet, TAKE 1 TABLET BY MOUTH EVERY EVENING, Disp: 28 tablet, Rfl: 5  •  methylPREDNISolone (MEDROL, EFRAIN,) 4 MG tablet, Take as directed on package instructions., Disp: 21 each, Rfl: 0  •  mirtazapine (REMERON) 15 MG tablet, Take 1 tablet by mouth Every Night., Disp: 30 tablet, Rfl: 2  •  O2 (OXYGEN), Inhale 4 L/min 1 (One) Time. Walking on 4L and sitting 3L, Disp: , Rfl:   •  oxybutynin (DITROPAN) 5 MG tablet, OAKE 1 TABLET BY MOUTH DAILY AT BEDTIME, Disp: 90 tablet, Rfl: 3  •  pantoprazole (PROTONIX) 40 MG EC tablet, TAKE  ONE TABLET BY MOUTH DAILY, Disp: 28 tablet, Rfl: 5  •  traZODone (DESYREL) 50 MG tablet, Take 1 tablet by mouth Every Evening., Disp: 30 tablet, Rfl: 1    The following portions of the patient's history were reviewed and updated as appropriate: allergies, current medications, past family history, past medical history, past social history, past surgical history and problem list.    Review of Systems   Constitutional: Positive for fatigue. Negative for chills, diaphoresis and fever.   HENT: Positive for rhinorrhea.    Respiratory: Positive for cough, shortness of breath and wheezing.    Cardiovascular: Negative.    Gastrointestinal: Positive for nausea. Negative for abdominal distention.   Musculoskeletal: Negative.    Skin: Negative.    Neurological: Negative.    Psychiatric/Behavioral: Negative.        Objective   Physical Exam   Constitutional: He is oriented to person, place, and time. He appears well-developed and well-nourished.   Pulmonary/Chest: No respiratory distress. He has wheezes.   Neurological: He is alert and oriented to person, place, and time.   Psychiatric: He has a normal mood and affect.       All tests have been reviewed.    Assessment/Plan   Diagnoses and all orders for this visit:    Steroid-dependent COPD (CMS/[facility])    COPD with exacerbation (CMS/[facility])    Other orders  -     ipratropium (Atrovent) 0.06 % nasal spray; 2 sprays into the nostril(s) as directed by provider 4 (Four) Times a Day.  -     doxycycline (VIBRAMYCIN) 100 MG capsule; Take 1 capsule by mouth 2 (Two) Times a Day.  -     methylPREDNISolone (MEDROL, EFRAIN,) 4 MG tablet; Take as directed on package instructions.      Call if no better       You have chosen to receive care through a telehealth visit.  Do you consent to use a video/audio connection for your medical care today? YES

## 2020-04-30 NOTE — TELEPHONE ENCOUNTER
PT CALLED AND IS REQUESTING SOMETHING BE CALLED IN FOR HIM T BREAK UP HIS CONGESTION    PT HAS A COUGH AND HAS BEEN EXPERIENCING THIS FOR THE LAST THREE DAYS     PT STATED HE HAS LOW ENERGY    [facility] - [place], [place] - [facility] - [phone]  - [phone] FX

## 2020-05-01 NOTE — PROGRESS NOTES
Subjective   Jani Pena is a 69 y.o. male.     Chief Complaint   Patient presents with   • Cough       History of Present Illness   This is a video visit.  Still has some yellow drainage sputum.  Patient still has a lot of cough even though improved.  Patient is on antibiotics last day today and a nebulizer 4 times a day and a steroid just finished.  Denies any wheezing short of breath now.  Patient does have a severe chronic COPD.  Patient is on oxygen now.  Patient also states groin rash is much improved after Lidex.    Current Outpatient Medications:   •  alfuzosin (UROXATRAL) 10 MG 24 hr tablet, Take 1 tablet by mouth Daily., Disp: 30 tablet, Rfl: 1  •  aspirin (ASPIRIN LOW DOSE) 81 MG chewable tablet, Chew 1 tablet Daily., Disp: 30 tablet, Rfl: 1  •  atorvastatin (LIPITOR) 10 MG tablet, Take 1 tablet by mouth Every Evening., Disp: 90 tablet, Rfl: 3  •  BENADRYL ITCH STOPPING 1-0.1 % cream, APPLY TOPICALLY TO AFFECTED AREA THREE TIMES A DAY AS NEEDED FOR ITCHING, Disp: 28.3 g, Rfl: 5  •  benzonatate (TESSALON) 200 MG capsule, Take 1 capsule by mouth 3 (Three) Times a Day As Needed for Cough., Disp: 40 capsule, Rfl: 0  •  BREO ELLIPTA 200-25 MCG/INH inhaler, 2 puffs Daily., Disp: , Rfl:   •  carboxymethylcellulose (REFRESH PLUS) 0.5 % solution, Administer 1 drop to both eyes 3 (Three) Times a Day As Needed for Dry Eyes., Disp: 1 bottle, Rfl: 0  •  clopidogrel (PLAVIX) 75 MG tablet, TAKE ONE TABLET BY MOUTH DAILY, Disp: 90 tablet, Rfl: 3  •  cyclobenzaprine (FLEXERIL) 5 MG tablet, Take 1 tablet by mouth 3 (Three) Times a Day As Needed for Muscle Spasms., Disp: 90 tablet, Rfl: 3  •  diphenhydrAMINE (BENADRYL) 2 % cream, Apply  topically to the appropriate area as directed 3 (Three) Times a Day As Needed for Itching., Disp: 15 g, Rfl: 0  •  diphenhydrAMINE (BENADRYL) 2 % cream, Apply  topically to the appropriate area as directed 3 (Three) Times a Day As Needed for Itching., Disp: 15 g, Rfl: 3  •  docusate  sodium (COLACE) 100 MG capsule, Take 1 capsule by mouth 2 (Two) Times a Day., Disp: 180 capsule, Rfl: 3  •  doxycycline (VIBRAMYCIN) 100 MG capsule, Take 1 capsule by mouth 2 (Two) Times a Day., Disp: 14 capsule, Rfl: 0  •  escitalopram (LEXAPRO) 20 MG tablet, Take 1 tablet by mouth Daily., Disp: 90 tablet, Rfl: 3  •  finasteride (PROSCAR) 5 MG tablet, TAKE ONE TABLET BY MOUTH DAILY, Disp: 90 tablet, Rfl: 3  •  fluocinonide-emollient (LIDEX-E) 0.05 % cream, Apply  topically to the appropriate area as directed 2 (Two) Times a Day., Disp: 60 g, Rfl: 0  •  Fluticasone-Umeclidin-Vilant 100-62.5-25 MCG/INH aerosol powder , Inhale 1 puff Daily., Disp: 28 each, Rfl: 1  •  furosemide (LASIX) 20 MG tablet, HOLD LASIX UNTIL FURTHER NOTICE, Disp: 30 tablet, Rfl: 2  •  hydrocortisone 1 % cream, Apply  topically to the appropriate area as directed As Needed (Itching)., Disp: 1 each, Rfl: 0  •  hydrocortisone 1 % cream, Apply  topically to the appropriate area as directed 2 (Two) Times a Day., Disp: 1 each, Rfl: 3  •  ipratropium (Atrovent) 0.06 % nasal spray, 2 sprays into the nostril(s) as directed by provider 4 (Four) Times a Day., Disp: 15 mL, Rfl: 1  •  ipratropium-albuterol (DUO-NEB) 0.5-2.5 mg/3 ml nebulizer, INHALE 1 VIAL PER NEBULIZER EVERY 6 HOURS, Disp: 360 mL, Rfl: 2  •  lidocaine (LIDODERM) 5 %, PLACE ONE PATCH ON SKIN AS DIRECTED,REMOVE & DISCARD PATCH WITHIN 12 HOURS, Disp: 30 patch, Rfl: 3  •  lisinopril (PRINIVIL,ZESTRIL) 5 MG tablet, TAKE ONE TABLET BY MOUTH DAILY, Disp: 90 tablet, Rfl: 3  •  melatonin 5 MG tablet tablet, TAKE 1 TABLET BY MOUTH EVERY EVENING, Disp: 28 tablet, Rfl: 5  •  methylPREDNISolone (MEDROL, EFRAIN,) 4 MG tablet, Take as directed on package instructions., Disp: 21 each, Rfl: 0  •  mirtazapine (REMERON) 15 MG tablet, Take 1 tablet by mouth Every Night., Disp: 30 tablet, Rfl: 2  •  O2 (OXYGEN), Inhale 4 L/min 1 (One) Time. Walking on 4L and sitting 3L, Disp: , Rfl:   •  oxybutynin (DITROPAN) 5  MG tablet, OAKE 1 TABLET BY MOUTH DAILY AT BEDTIME, Disp: 90 tablet, Rfl: 3  •  pantoprazole (PROTONIX) 40 MG EC tablet, TAKE ONE TABLET BY MOUTH DAILY, Disp: 28 tablet, Rfl: 5  •  traZODone (DESYREL) 50 MG tablet, Take 1 tablet by mouth Every Evening., Disp: 30 tablet, Rfl: 1    The following portions of the patient's history were reviewed and updated as appropriate: allergies, current medications, past family history, past medical history, past social history, past surgical history and problem list.    Review of Systems   Constitutional: Negative.  Negative for chills and fever.   Respiratory: Negative.    Cardiovascular: Negative.    Gastrointestinal: Negative.    Musculoskeletal: Negative.    Skin: Negative.    Neurological: Negative.    Psychiatric/Behavioral: Negative.        Objective   Physical Exam   Constitutional: He is oriented to person, place, and time. He appears well-developed and well-nourished.   Pulmonary/Chest: Effort normal.   Musculoskeletal: Normal range of motion.   Neurological: He is alert and oriented to person, place, and time.   Psychiatric: He has a normal mood and affect.       All tests have been reviewed.    Assessment/Plan   Diagnoses and all orders for this visit:    Steroid-dependent COPD (CMS/HCC) continue medication    COPD with exacerbation (CMS/HCC) continue medication    Xerosis of skin continue treatment    Flexural eczema continue treatment    Other orders  -     benzonatate (TESSALON) 200 MG capsule; Take 1 capsule by mouth 3 (Three) Times a Day As Needed for Cough.  -     doxycycline (VIBRAMYCIN) 100 MG capsule; Take 1 capsule by mouth 2 (Two) Times a Day.      Runny nose encourage patient to start Atrovent nasal spray    Follow-up in 2 weeks with others       You have chosen to receive care through a telehealth visit.  Do you consent to use a video/audio connection for your medical care today? YES

## 2020-05-04 ENCOUNTER — TELEMEDICINE (OUTPATIENT)
Dept: INTERNAL MEDICINE | Facility: CLINIC | Age: 70
End: 2020-05-04

## 2020-05-04 ENCOUNTER — TELEPHONE (OUTPATIENT)
Dept: INTERNAL MEDICINE | Facility: CLINIC | Age: 70
End: 2020-05-04

## 2020-05-04 DIAGNOSIS — J44.1 COPD WITH EXACERBATION (HCC): ICD-10-CM

## 2020-05-04 DIAGNOSIS — J44.9 STEROID-DEPENDENT COPD (HCC): Primary | ICD-10-CM

## 2020-05-04 DIAGNOSIS — L85.3 XEROSIS OF SKIN: ICD-10-CM

## 2020-05-04 DIAGNOSIS — L20.82 FLEXURAL ECZEMA: ICD-10-CM

## 2020-05-04 PROCEDURE — 99214 OFFICE O/P EST MOD 30 MIN: CPT | Performed by: INTERNAL MEDICINE

## 2020-05-04 RX ORDER — BENZONATATE 200 MG/1
200 CAPSULE ORAL 3 TIMES DAILY PRN
Qty: 40 CAPSULE | Refills: 0 | Status: SHIPPED | OUTPATIENT
Start: 2020-05-04 | End: 2020-06-15

## 2020-05-04 RX ORDER — DOXYCYCLINE HYCLATE 100 MG/1
100 CAPSULE ORAL 2 TIMES DAILY
Qty: 14 CAPSULE | Refills: 0 | Status: SHIPPED | OUTPATIENT
Start: 2020-05-04 | End: 2020-05-19

## 2020-05-04 NOTE — TELEPHONE ENCOUNTER
I spoke with Yoana. Jani is finishing a script of doxy that was for 10 days and then he will start the new script written for today - it will be an additional 7 days

## 2020-05-04 NOTE — TELEPHONE ENCOUNTER
JEN WITH [facility] CALLED NEEDING CLARIFICATION ON doxycycline (VIBRAMYCIN) 100 MG capsule SHE WOULD LIKE TO KNOW IF PHYSICIAN WANTS PT TO STAY ON MEDICATION A LITTLE LONGER OR WHY WAS A 2ND RX CALLED IN FOR PT PLEASE ADVISE      CB NUMBER:   (Pharmacy) [phone]

## 2020-05-19 ENCOUNTER — TELEMEDICINE (OUTPATIENT)
Dept: INTERNAL MEDICINE | Facility: CLINIC | Age: 70
End: 2020-05-19

## 2020-05-19 DIAGNOSIS — J44.1 COPD WITH EXACERBATION (HCC): Primary | ICD-10-CM

## 2020-05-19 PROCEDURE — 99213 OFFICE O/P EST LOW 20 MIN: CPT | Performed by: INTERNAL MEDICINE

## 2020-05-19 RX ORDER — AZITHROMYCIN 500 MG/1
500 TABLET, FILM COATED ORAL DAILY
Qty: 5 TABLET | Refills: 0 | Status: SHIPPED | OUTPATIENT
Start: 2020-05-19 | End: 2020-07-23

## 2020-05-19 NOTE — PROGRESS NOTES
Subjective   Jani Pena is a 70 y.o. male.     Chief Complaint   Patient presents with   • Follow-up       History of Present Illness video visit  Patient here for follow-up.  Patient still has lot of coughing sputum yellow wheezing short of breath.  Patient had multiple rounds of antibiotics no significant help.  Denies any desaturation on oxygen.  No respiratory distress.  Patient has chronic end-stage COPD.    Current Outpatient Medications:   •  alfuzosin (UROXATRAL) 10 MG 24 hr tablet, Take 1 tablet by mouth Daily., Disp: 30 tablet, Rfl: 1  •  aspirin (ASPIRIN LOW DOSE) 81 MG chewable tablet, Chew 1 tablet Daily., Disp: 30 tablet, Rfl: 1  •  atorvastatin (LIPITOR) 10 MG tablet, Take 1 tablet by mouth Every Evening., Disp: 90 tablet, Rfl: 3  •  BENADRYL ITCH STOPPING 1-0.1 % cream, APPLY TOPICALLY TO AFFECTED AREA THREE TIMES A DAY AS NEEDED FOR ITCHING, Disp: 28.3 g, Rfl: 5  •  benzonatate (TESSALON) 200 MG capsule, Take 1 capsule by mouth 3 (Three) Times a Day As Needed for Cough., Disp: 40 capsule, Rfl: 0  •  BREO ELLIPTA 200-25 MCG/INH inhaler, 2 puffs Daily., Disp: , Rfl:   •  carboxymethylcellulose (REFRESH PLUS) 0.5 % solution, Administer 1 drop to both eyes 3 (Three) Times a Day As Needed for Dry Eyes., Disp: 1 bottle, Rfl: 0  •  clopidogrel (PLAVIX) 75 MG tablet, TAKE ONE TABLET BY MOUTH DAILY, Disp: 90 tablet, Rfl: 3  •  cyclobenzaprine (FLEXERIL) 5 MG tablet, Take 1 tablet by mouth 3 (Three) Times a Day As Needed for Muscle Spasms., Disp: 90 tablet, Rfl: 3  •  diphenhydrAMINE (BENADRYL) 2 % cream, Apply  topically to the appropriate area as directed 3 (Three) Times a Day As Needed for Itching., Disp: 15 g, Rfl: 0  •  diphenhydrAMINE (BENADRYL) 2 % cream, Apply  topically to the appropriate area as directed 3 (Three) Times a Day As Needed for Itching., Disp: 15 g, Rfl: 3  •  docusate sodium (COLACE) 100 MG capsule, Take 1 capsule by mouth 2 (Two) Times a Day., Disp: 180 capsule, Rfl: 3  •   doxycycline (VIBRAMYCIN) 100 MG capsule, Take 1 capsule by mouth 2 (Two) Times a Day., Disp: 14 capsule, Rfl: 0  •  escitalopram (LEXAPRO) 20 MG tablet, Take 1 tablet by mouth Daily., Disp: 90 tablet, Rfl: 3  •  finasteride (PROSCAR) 5 MG tablet, TAKE ONE TABLET BY MOUTH DAILY, Disp: 90 tablet, Rfl: 3  •  fluocinonide-emollient (LIDEX-E) 0.05 % cream, Apply  topically to the appropriate area as directed 2 (Two) Times a Day., Disp: 60 g, Rfl: 0  •  Fluticasone-Umeclidin-Vilant 100-62.5-25 MCG/INH aerosol powder , Inhale 1 puff Daily., Disp: 28 each, Rfl: 1  •  furosemide (LASIX) 20 MG tablet, HOLD LASIX UNTIL FURTHER NOTICE, Disp: 30 tablet, Rfl: 2  •  hydrocortisone 1 % cream, Apply  topically to the appropriate area as directed As Needed (Itching)., Disp: 1 each, Rfl: 0  •  hydrocortisone 1 % cream, Apply  topically to the appropriate area as directed 2 (Two) Times a Day., Disp: 1 each, Rfl: 3  •  ipratropium (Atrovent) 0.06 % nasal spray, 2 sprays into the nostril(s) as directed by provider 4 (Four) Times a Day., Disp: 15 mL, Rfl: 1  •  ipratropium-albuterol (DUO-NEB) 0.5-2.5 mg/3 ml nebulizer, INHALE 1 VIAL PER NEBULIZER EVERY 6 HOURS, Disp: 360 mL, Rfl: 2  •  lidocaine (LIDODERM) 5 %, PLACE ONE PATCH ON SKIN AS DIRECTED,REMOVE & DISCARD PATCH WITHIN 12 HOURS, Disp: 30 patch, Rfl: 3  •  lisinopril (PRINIVIL,ZESTRIL) 5 MG tablet, TAKE ONE TABLET BY MOUTH DAILY, Disp: 90 tablet, Rfl: 3  •  melatonin 5 MG tablet tablet, TAKE 1 TABLET BY MOUTH EVERY EVENING, Disp: 28 tablet, Rfl: 5  •  mirtazapine (REMERON) 15 MG tablet, Take 1 tablet by mouth Every Night., Disp: 30 tablet, Rfl: 2  •  O2 (OXYGEN), Inhale 4 L/min 1 (One) Time. Walking on 4L and sitting 3L, Disp: , Rfl:   •  oxybutynin (DITROPAN) 5 MG tablet, OAKE 1 TABLET BY MOUTH DAILY AT BEDTIME, Disp: 90 tablet, Rfl: 3  •  pantoprazole (PROTONIX) 40 MG EC tablet, TAKE ONE TABLET BY MOUTH DAILY, Disp: 28 tablet, Rfl: 5  •  traZODone (DESYREL) 50 MG tablet, Take 1  tablet by mouth Every Evening., Disp: 30 tablet, Rfl: 1    The following portions of the patient's history were reviewed and updated as appropriate: allergies, current medications, past family history, past medical history, past social history, past surgical history and problem list.    Review of Systems   Constitutional: Negative.    HENT: Positive for congestion.    Respiratory: Positive for cough, shortness of breath and wheezing.    Cardiovascular: Negative.    Gastrointestinal: Negative.    Musculoskeletal: Negative.    Skin: Negative.    Neurological: Negative.    Psychiatric/Behavioral: Negative.        Objective   Physical Exam   Constitutional: He is oriented to person, place, and time. He appears well-developed and well-nourished.   Pulmonary/Chest: He is in respiratory distress.   Neurological: He is alert and oriented to person, place, and time.   Psychiatric: He has a normal mood and affect.       All tests have been reviewed.    Assessment/Plan   There are no diagnoses linked to this encounter.          Steroid-dependent COPD (CMS/HCC) continue medication     COPD with exacerbation (CMS/HCC)start zithromax, cont neb, tessalon, Allegra steroid     Xerosis of skin  eczema improved after med       Follow-up in 2 weeks with others

## 2020-05-20 RX ORDER — MIRTAZAPINE 15 MG/1
TABLET, FILM COATED ORAL
Qty: 30 TABLET | Refills: 5 | Status: SHIPPED | OUTPATIENT
Start: 2020-05-20 | End: 2020-05-22

## 2020-05-20 RX ORDER — TRAZODONE HYDROCHLORIDE 50 MG/1
50 TABLET ORAL EVERY EVENING
Qty: 30 TABLET | Refills: 5 | Status: SHIPPED | OUTPATIENT
Start: 2020-05-20 | End: 2020-05-22

## 2020-05-20 RX ORDER — ASPIRIN 81 MG
TABLET,CHEWABLE ORAL
Qty: 30 TABLET | Refills: 5 | Status: SHIPPED | OUTPATIENT
Start: 2020-05-20 | End: 2020-05-22

## 2020-05-20 RX ORDER — ALFUZOSIN HYDROCHLORIDE 10 MG/1
TABLET, EXTENDED RELEASE ORAL
Qty: 30 TABLET | Refills: 5 | Status: SHIPPED | OUTPATIENT
Start: 2020-05-20 | End: 2020-05-22

## 2020-05-20 NOTE — TELEPHONE ENCOUNTER
PT CALLED IN CONCERNED ABOUT HIS BLOOD PRESSURE STATED THAT YESTERDAY IT /92, ALSO CONCERNED WITH HIM TAKING ONLY TABLET FOR BP A DAY.PT WOULD LIKE A CALL BACK.    PLEASE ADVISE.  CALL BACK:[phone]

## 2020-05-22 RX ORDER — TRAZODONE HYDROCHLORIDE 50 MG/1
50 TABLET ORAL EVERY EVENING
Qty: 28 TABLET | Refills: 5 | Status: SHIPPED | OUTPATIENT
Start: 2020-05-22 | End: 2020-01-01

## 2020-05-22 RX ORDER — MIRTAZAPINE 15 MG/1
TABLET, FILM COATED ORAL
Qty: 28 TABLET | Refills: 5 | Status: SHIPPED | OUTPATIENT
Start: 2020-05-22 | End: 2020-01-01

## 2020-05-22 RX ORDER — ALFUZOSIN HYDROCHLORIDE 10 MG/1
TABLET, EXTENDED RELEASE ORAL
Qty: 28 TABLET | Refills: 5 | Status: ON HOLD | OUTPATIENT
Start: 2020-05-22 | End: 2020-01-01

## 2020-05-22 RX ORDER — ASPIRIN 81 MG
TABLET,CHEWABLE ORAL
Qty: 28 TABLET | Refills: 5 | Status: ON HOLD | OUTPATIENT
Start: 2020-05-22 | End: 2020-01-01

## 2020-05-27 ENCOUNTER — TELEPHONE (OUTPATIENT)
Dept: INTERNAL MEDICINE | Facility: CLINIC | Age: 70
End: 2020-05-27

## 2020-05-27 DIAGNOSIS — R53.1 WEAKNESS: Primary | ICD-10-CM

## 2020-05-27 NOTE — TELEPHONE ENCOUNTER
Can we do video visit today?    If blood pressure continues to be low patient needs to go to emergency room

## 2020-05-29 ENCOUNTER — TELEMEDICINE (OUTPATIENT)
Dept: INTERNAL MEDICINE | Facility: CLINIC | Age: 70
End: 2020-05-29

## 2020-05-29 DIAGNOSIS — M25.522 LEFT ELBOW PAIN: ICD-10-CM

## 2020-05-29 DIAGNOSIS — M62.838 MUSCLE SPASM: Primary | ICD-10-CM

## 2020-05-29 DIAGNOSIS — J44.9 CHRONIC OBSTRUCTIVE PULMONARY DISEASE, UNSPECIFIED COPD TYPE (HCC): ICD-10-CM

## 2020-05-29 DIAGNOSIS — Z13.0 SCREENING FOR ENDOCRINE, METABOLIC AND IMMUNITY DISORDER: ICD-10-CM

## 2020-05-29 DIAGNOSIS — Z13.29 SCREENING FOR ENDOCRINE, METABOLIC AND IMMUNITY DISORDER: ICD-10-CM

## 2020-05-29 DIAGNOSIS — Z13.89 SCREENING FOR GOUT: ICD-10-CM

## 2020-05-29 DIAGNOSIS — Z13.228 SCREENING FOR ENDOCRINE, METABOLIC AND IMMUNITY DISORDER: ICD-10-CM

## 2020-05-29 DIAGNOSIS — Z13.0 SCREENING FOR DISORDER OF BLOOD AND BLOOD-FORMING ORGANS: ICD-10-CM

## 2020-05-29 PROCEDURE — 99214 OFFICE O/P EST MOD 30 MIN: CPT | Performed by: NURSE PRACTITIONER

## 2020-05-29 RX ORDER — PREDNISONE 5 MG/1
1 TABLET ORAL DAILY
Qty: 21 EACH | Refills: 0 | Status: SHIPPED | OUTPATIENT
Start: 2020-05-29 | End: 2020-05-29

## 2020-05-29 RX ORDER — CYCLOBENZAPRINE HCL 5 MG
5 TABLET ORAL 3 TIMES DAILY PRN
Qty: 90 TABLET | Refills: 3 | Status: SHIPPED | OUTPATIENT
Start: 2020-05-29 | End: 2020-01-01

## 2020-05-29 RX ORDER — PREDNISONE 5 MG/1
1 TABLET ORAL DAILY
Qty: 21 EACH | Refills: 0 | Status: SHIPPED | OUTPATIENT
Start: 2020-05-29 | End: 2020-06-22

## 2020-06-03 ENCOUNTER — RESULTS ENCOUNTER (OUTPATIENT)
Dept: INTERNAL MEDICINE | Facility: CLINIC | Age: 70
End: 2020-06-03

## 2020-06-03 DIAGNOSIS — Z13.89 SCREENING FOR GOUT: ICD-10-CM

## 2020-06-03 DIAGNOSIS — Z13.0 SCREENING FOR ENDOCRINE, METABOLIC AND IMMUNITY DISORDER: ICD-10-CM

## 2020-06-03 DIAGNOSIS — Z13.29 SCREENING FOR ENDOCRINE, METABOLIC AND IMMUNITY DISORDER: ICD-10-CM

## 2020-06-03 DIAGNOSIS — Z13.0 SCREENING FOR DISORDER OF BLOOD AND BLOOD-FORMING ORGANS: ICD-10-CM

## 2020-06-03 DIAGNOSIS — Z13.228 SCREENING FOR ENDOCRINE, METABOLIC AND IMMUNITY DISORDER: ICD-10-CM

## 2020-06-10 RX ORDER — LIDOCAINE 50 MG/G
PATCH TOPICAL
Qty: 30 PATCH | Refills: 3 | Status: SHIPPED | OUTPATIENT
Start: 2020-06-10 | End: 2020-01-01

## 2020-06-15 RX ORDER — BENZONATATE 200 MG/1
CAPSULE ORAL
Qty: 40 CAPSULE | Refills: 0 | Status: SHIPPED | OUTPATIENT
Start: 2020-06-15 | End: 2020-07-17

## 2020-06-15 RX ORDER — ACETAMINOPHEN 500 MG/1
TABLET, FILM COATED ORAL
Qty: 90 TABLET | Refills: 0 | Status: SHIPPED | OUTPATIENT
Start: 2020-06-15 | End: 2020-01-01

## 2020-06-22 ENCOUNTER — TELEMEDICINE (OUTPATIENT)
Dept: INTERNAL MEDICINE | Facility: CLINIC | Age: 70
End: 2020-06-22

## 2020-06-22 DIAGNOSIS — N18.9 CHRONIC KIDNEY DISEASE, UNSPECIFIED CKD STAGE: ICD-10-CM

## 2020-06-22 DIAGNOSIS — D64.9 ANEMIA, UNSPECIFIED TYPE: ICD-10-CM

## 2020-06-22 DIAGNOSIS — I10 ESSENTIAL HYPERTENSION: ICD-10-CM

## 2020-06-22 DIAGNOSIS — J44.1 COPD WITH EXACERBATION (HCC): ICD-10-CM

## 2020-06-22 DIAGNOSIS — J44.9 STEROID-DEPENDENT COPD (HCC): Primary | ICD-10-CM

## 2020-06-22 DIAGNOSIS — M10.9 GOUT, UNSPECIFIED CAUSE, UNSPECIFIED CHRONICITY, UNSPECIFIED SITE: ICD-10-CM

## 2020-06-22 PROBLEM — I95.9 HYPOTENSION: Status: RESOLVED | Noted: 2020-01-14 | Resolved: 2020-06-22

## 2020-06-22 PROCEDURE — 99214 OFFICE O/P EST MOD 30 MIN: CPT | Performed by: INTERNAL MEDICINE

## 2020-06-22 RX ORDER — PREDNISONE 10 MG/1
10 TABLET ORAL DAILY
Qty: 30 TABLET | Refills: 1 | Status: SHIPPED | OUTPATIENT
Start: 2020-06-22 | End: 2020-06-23 | Stop reason: SDUPTHER

## 2020-06-22 RX ORDER — AMLODIPINE BESYLATE 2.5 MG/1
2.5 TABLET ORAL DAILY
Qty: 30 TABLET | Refills: 1 | Status: SHIPPED | OUTPATIENT
Start: 2020-06-22 | End: 2020-06-23 | Stop reason: SDUPTHER

## 2020-06-22 NOTE — PROGRESS NOTES
Subjective   Jani Pena is a 70 y.o. male.     No chief complaint on file.  Chief complaint Elbow pain COPD follow-up blood pressure elevated  You have chosen to receive care through a telephone visit. Do you consent to use a telephone visit for your medical care today? Yes        History of Present Illness video visit  Patient here for follow-up.  Blood pressure elevated recently.  Creatinine elevated also.  Hemoglobin decreased.  COPD patient used to take a prednisone patient states medicine ran out.  Patient also complains both elbow swollen mild erythema range of motion normal but some pain with movement.  Also has tenderness.  History of a gout.    Current Outpatient Medications:   •  alfuzosin (UROXATRAL) 10 MG 24 hr tablet, @@ TAKE ONE TABLET BY MOUTH DAILY, Disp: 28 tablet, Rfl: 5  •  amLODIPine (NORVASC) 2.5 MG tablet, Take 1 tablet by mouth Daily., Disp: 30 tablet, Rfl: 1  •  ASPIRIN LOW DOSE 81 MG chewable tablet, @@ TAKE ONE TABLET BY MOUTH DAILY, Disp: 28 tablet, Rfl: 5  •  atorvastatin (LIPITOR) 10 MG tablet, Take 1 tablet by mouth Every Evening., Disp: 90 tablet, Rfl: 3  •  azithromycin (Zithromax) 500 MG tablet, Take 1 tablet by mouth Daily., Disp: 5 tablet, Rfl: 0  •  BENADRYL ITCH STOPPING 1-0.1 % cream, APPLY TOPICALLY TO AFFECTED AREA THREE TIMES A DAY AS NEEDED FOR ITCHING, Disp: 28.3 g, Rfl: 5  •  benzonatate (TESSALON) 200 MG capsule, TAKE ONE CAPSULE BY MOUTH THREE TIMES A DAY AS NEEDED FOR COUGH, Disp: 40 capsule, Rfl: 0  •  BREO ELLIPTA 200-25 MCG/INH inhaler, 2 puffs Daily., Disp: , Rfl:   •  carboxymethylcellulose (REFRESH PLUS) 0.5 % solution, Administer 1 drop to both eyes 3 (Three) Times a Day As Needed for Dry Eyes., Disp: 1 bottle, Rfl: 0  •  clopidogrel (PLAVIX) 75 MG tablet, TAKE ONE TABLET BY MOUTH DAILY, Disp: 90 tablet, Rfl: 3  •  cyclobenzaprine (FLEXERIL) 5 MG tablet, Take 1 tablet by mouth 3 (Three) Times a Day As Needed for Muscle Spasms., Disp: 90 tablet, Rfl: 3  •   diphenhydrAMINE (BENADRYL) 2 % cream, Apply  topically to the appropriate area as directed 3 (Three) Times a Day As Needed for Itching., Disp: 15 g, Rfl: 0  •  diphenhydrAMINE (BENADRYL) 2 % cream, Apply  topically to the appropriate area as directed 3 (Three) Times a Day As Needed for Itching., Disp: 15 g, Rfl: 3  •  docusate sodium (COLACE) 100 MG capsule, Take 1 capsule by mouth 2 (Two) Times a Day., Disp: 180 capsule, Rfl: 3  •  escitalopram (LEXAPRO) 20 MG tablet, Take 1 tablet by mouth Daily., Disp: 90 tablet, Rfl: 3  •  finasteride (PROSCAR) 5 MG tablet, TAKE ONE TABLET BY MOUTH DAILY, Disp: 90 tablet, Rfl: 3  •  fluocinonide-emollient (LIDEX-E) 0.05 % cream, Apply  topically to the appropriate area as directed 2 (Two) Times a Day., Disp: 60 g, Rfl: 0  •  Fluticasone-Umeclidin-Vilant 100-62.5-25 MCG/INH aerosol powder , Inhale 1 puff Daily., Disp: 28 each, Rfl: 1  •  furosemide (LASIX) 20 MG tablet, HOLD LASIX UNTIL FURTHER NOTICE, Disp: 30 tablet, Rfl: 2  •  hydrocortisone 1 % cream, Apply  topically to the appropriate area as directed 2 (Two) Times a Day., Disp: 1 each, Rfl: 3  •  ipratropium (Atrovent) 0.06 % nasal spray, 2 sprays into the nostril(s) as directed by provider 4 (Four) Times a Day., Disp: 15 mL, Rfl: 1  •  ipratropium-albuterol (DUO-NEB) 0.5-2.5 mg/3 ml nebulizer, INHALE 1 VIAL PER NEBULIZER EVERY 6 HOURS, Disp: 360 mL, Rfl: 2  •  lidocaine (LIDODERM) 5 %, PLACE ONE PATCH ON SKIN AS DIRECTED,REMOVE & DISCARD PATCH WITHIN 12 HOURS, Disp: 30 patch, Rfl: 3  •  lisinopril (PRINIVIL,ZESTRIL) 5 MG tablet, TAKE ONE TABLET BY MOUTH DAILY, Disp: 90 tablet, Rfl: 3  •  melatonin 5 MG tablet tablet, TAKE 1 TABLET BY MOUTH EVERY EVENING, Disp: 28 tablet, Rfl: 5  •  mirtazapine (REMERON) 15 MG tablet, TAKE 1 TABLET BY MOUTH DAILY AT BEDTIME, Disp: 28 tablet, Rfl: 5  •  O2 (OXYGEN), Inhale 4 L/min 1 (One) Time. Walking on 4L and sitting 3L, Disp: , Rfl:   •  oxybutynin (DITROPAN) 5 MG tablet, OAKE 1 TABLET  BY MOUTH DAILY AT BEDTIME, Disp: 90 tablet, Rfl: 3  •  PAIN RELIEF EXTRA STRENGTH 500 MG tablet, TAKE ONE TABLET BY MOUTH THREE TIMES A DAY AS NEEDED, Disp: 90 tablet, Rfl: 0  •  pantoprazole (PROTONIX) 40 MG EC tablet, TAKE ONE TABLET BY MOUTH DAILY, Disp: 28 tablet, Rfl: 5  •  predniSONE (DELTASONE) 10 MG tablet, Take 1 tablet by mouth Daily., Disp: 30 tablet, Rfl: 1  •  traZODone (DESYREL) 50 MG tablet, TAKE 1 TABLET BY MOUTH EVERY EVENING, Disp: 28 tablet, Rfl: 5    The following portions of the patient's history were reviewed and updated as appropriate: allergies, current medications, past family history, past medical history, past social history, past surgical history and problem list.    Review of Systems   Constitutional: Negative.    Respiratory: Negative.    Cardiovascular: Negative.         Bp high   Gastrointestinal: Negative.    Musculoskeletal: Positive for arthralgias.   Skin: Negative.    Neurological: Negative.    Psychiatric/Behavioral: Negative.        Objective   Physical Exam   Constitutional: He is oriented to person, place, and time. He appears well-developed and well-nourished.   Neck: Normal range of motion. Neck supple.   Musculoskeletal: Normal range of motion.   Elbow swelling and mild erythema   Neurological: He is alert and oriented to person, place, and time.   Psychiatric: He has a normal mood and affect.       All tests have been reviewed.    Assessment/Plan   Diagnoses and all orders for this visit:    Steroid-dependent COPD (CMS/HCC)  -     predniSONE (DELTASONE) 10 MG tablet; Take 1 tablet by mouth Daily.    COPD with exacerbation (CMS/HCC)    Chronic kidney disease, unspecified CKD stage  -     Comprehensive Metabolic Panel    Anemia, unspecified type  -     CBC & Differential    Essential hypertension  -     amLODIPine (NORVASC) 2.5 MG tablet; Take 1 tablet by mouth Daily.    Gout, unspecified cause, unspecified chronicity, unspecified site  -     Uric Acid      1 mo

## 2020-06-22 NOTE — PROGRESS NOTES
You have chosen to receive care through a telehealth visit.  Do you consent to use a video/audio connection for your medical care today? Yes

## 2020-06-23 DIAGNOSIS — I10 ESSENTIAL HYPERTENSION: ICD-10-CM

## 2020-06-23 DIAGNOSIS — J44.9 STEROID-DEPENDENT COPD (HCC): ICD-10-CM

## 2020-06-23 RX ORDER — PREDNISONE 10 MG/1
10 TABLET ORAL DAILY
Qty: 30 TABLET | Refills: 1 | Status: SHIPPED | OUTPATIENT
Start: 2020-06-23 | End: 2020-01-01

## 2020-06-23 RX ORDER — AMLODIPINE BESYLATE 2.5 MG/1
2.5 TABLET ORAL DAILY
Qty: 30 TABLET | Refills: 1 | Status: SHIPPED | OUTPATIENT
Start: 2020-06-23 | End: 2020-07-23 | Stop reason: SDUPTHER

## 2020-07-01 ENCOUNTER — TELEPHONE (OUTPATIENT)
Dept: INTERNAL MEDICINE | Facility: CLINIC | Age: 70
End: 2020-07-01

## 2020-07-02 ENCOUNTER — TELEMEDICINE (OUTPATIENT)
Dept: INTERNAL MEDICINE | Facility: CLINIC | Age: 70
End: 2020-07-02

## 2020-07-02 DIAGNOSIS — D64.9 ANEMIA, UNSPECIFIED TYPE: Primary | ICD-10-CM

## 2020-07-02 DIAGNOSIS — J44.9 STEROID-DEPENDENT COPD (HCC): ICD-10-CM

## 2020-07-02 PROCEDURE — 99214 OFFICE O/P EST MOD 30 MIN: CPT | Performed by: INTERNAL MEDICINE

## 2020-07-02 RX ORDER — LEVOFLOXACIN 500 MG/1
500 TABLET, FILM COATED ORAL DAILY
Qty: 10 TABLET | Refills: 0 | Status: SHIPPED | OUTPATIENT
Start: 2020-07-02 | End: 2020-01-01

## 2020-07-02 NOTE — PROGRESS NOTES
Subjective   Jani Pena is a 70 y.o. male.     No chief complaint on file.    You have chosen to receive care through a telephone visit. Do you consent to use a telephone visit for your medical care today? Yes  History of Present Illness   Cough a lot and soa no energy, on O2 now 4 L, O2 sat 94%.  Yellow sputum.  Exertional soa, breathing tx helps. Yellow sputum. 3 and 4 days. Patient has hx of end stage lung disease. No fever or chill. Similar episodes in the past. Bruise easy , patient also has a low hemoglobin due to chronic kidney disease    Current Outpatient Medications:   •  alfuzosin (UROXATRAL) 10 MG 24 hr tablet, @@ TAKE ONE TABLET BY MOUTH DAILY, Disp: 28 tablet, Rfl: 5  •  amLODIPine (NORVASC) 2.5 MG tablet, Take 1 tablet by mouth Daily., Disp: 30 tablet, Rfl: 1  •  ASPIRIN LOW DOSE 81 MG chewable tablet, @@ TAKE ONE TABLET BY MOUTH DAILY, Disp: 28 tablet, Rfl: 5  •  atorvastatin (LIPITOR) 10 MG tablet, Take 1 tablet by mouth Every Evening., Disp: 90 tablet, Rfl: 3  •  azithromycin (Zithromax) 500 MG tablet, Take 1 tablet by mouth Daily., Disp: 5 tablet, Rfl: 0  •  BENADRYL ITCH STOPPING 1-0.1 % cream, APPLY TOPICALLY TO AFFECTED AREA THREE TIMES A DAY AS NEEDED FOR ITCHING, Disp: 28.3 g, Rfl: 5  •  benzonatate (TESSALON) 200 MG capsule, TAKE ONE CAPSULE BY MOUTH THREE TIMES A DAY AS NEEDED FOR COUGH, Disp: 40 capsule, Rfl: 0  •  BREO ELLIPTA 200-25 MCG/INH inhaler, 2 puffs Daily., Disp: , Rfl:   •  carboxymethylcellulose (REFRESH PLUS) 0.5 % solution, Administer 1 drop to both eyes 3 (Three) Times a Day As Needed for Dry Eyes., Disp: 1 bottle, Rfl: 0  •  clopidogrel (PLAVIX) 75 MG tablet, TAKE ONE TABLET BY MOUTH DAILY, Disp: 90 tablet, Rfl: 3  •  cyclobenzaprine (FLEXERIL) 5 MG tablet, Take 1 tablet by mouth 3 (Three) Times a Day As Needed for Muscle Spasms., Disp: 90 tablet, Rfl: 3  •  diphenhydrAMINE (BENADRYL) 2 % cream, Apply  topically to the appropriate area as directed 3 (Three) Times a  Day As Needed for Itching., Disp: 15 g, Rfl: 0  •  diphenhydrAMINE (BENADRYL) 2 % cream, Apply  topically to the appropriate area as directed 3 (Three) Times a Day As Needed for Itching., Disp: 15 g, Rfl: 3  •  docusate sodium (COLACE) 100 MG capsule, Take 1 capsule by mouth 2 (Two) Times a Day., Disp: 180 capsule, Rfl: 3  •  escitalopram (LEXAPRO) 20 MG tablet, Take 1 tablet by mouth Daily., Disp: 90 tablet, Rfl: 3  •  finasteride (PROSCAR) 5 MG tablet, TAKE ONE TABLET BY MOUTH DAILY, Disp: 90 tablet, Rfl: 3  •  fluocinonide-emollient (LIDEX-E) 0.05 % cream, Apply  topically to the appropriate area as directed 2 (Two) Times a Day., Disp: 60 g, Rfl: 0  •  Fluticasone-Umeclidin-Vilant 100-62.5-25 MCG/INH aerosol powder , Inhale 1 puff Daily., Disp: 28 each, Rfl: 1  •  furosemide (LASIX) 20 MG tablet, HOLD LASIX UNTIL FURTHER NOTICE, Disp: 30 tablet, Rfl: 2  •  hydrocortisone 1 % cream, Apply  topically to the appropriate area as directed 2 (Two) Times a Day., Disp: 1 each, Rfl: 3  •  ipratropium (Atrovent) 0.06 % nasal spray, 2 sprays into the nostril(s) as directed by provider 4 (Four) Times a Day., Disp: 15 mL, Rfl: 1  •  ipratropium-albuterol (DUO-NEB) 0.5-2.5 mg/3 ml nebulizer, INHALE 1 VIAL PER NEBULIZER EVERY 6 HOURS, Disp: 360 mL, Rfl: 2  •  levoFLOXacin (Levaquin) 500 MG tablet, Take 1 tablet by mouth Daily., Disp: 10 tablet, Rfl: 0  •  lidocaine (LIDODERM) 5 %, PLACE ONE PATCH ON SKIN AS DIRECTED,REMOVE & DISCARD PATCH WITHIN 12 HOURS, Disp: 30 patch, Rfl: 3  •  lisinopril (PRINIVIL,ZESTRIL) 5 MG tablet, TAKE ONE TABLET BY MOUTH DAILY, Disp: 90 tablet, Rfl: 3  •  melatonin 5 MG tablet tablet, TAKE 1 TABLET BY MOUTH EVERY EVENING, Disp: 28 tablet, Rfl: 5  •  mirtazapine (REMERON) 15 MG tablet, TAKE 1 TABLET BY MOUTH DAILY AT BEDTIME, Disp: 28 tablet, Rfl: 5  •  O2 (OXYGEN), Inhale 4 L/min 1 (One) Time. Walking on 4L and sitting 3L, Disp: , Rfl:   •  oxybutynin (DITROPAN) 5 MG tablet, OAKE 1 TABLET BY MOUTH  DAILY AT BEDTIME, Disp: 90 tablet, Rfl: 3  •  PAIN RELIEF EXTRA STRENGTH 500 MG tablet, TAKE ONE TABLET BY MOUTH THREE TIMES A DAY AS NEEDED, Disp: 90 tablet, Rfl: 0  •  pantoprazole (PROTONIX) 40 MG EC tablet, TAKE ONE TABLET BY MOUTH DAILY, Disp: 28 tablet, Rfl: 5  •  predniSONE (DELTASONE) 10 MG tablet, Take 1 tablet by mouth Daily., Disp: 30 tablet, Rfl: 1  •  traZODone (DESYREL) 50 MG tablet, TAKE 1 TABLET BY MOUTH EVERY EVENING, Disp: 28 tablet, Rfl: 5    The following portions of the patient's history were reviewed and updated as appropriate: allergies, current medications, past family history, past medical history, past social history, past surgical history and problem list.    Review of Systems   Constitutional: Negative.    Respiratory: Positive for cough, shortness of breath and wheezing.    Cardiovascular: Negative.    Gastrointestinal: Negative.    Musculoskeletal: Negative.    Skin: Negative.    Neurological: Negative.    Psychiatric/Behavioral: Negative.        Objective   Physical Exam   Constitutional: He is oriented to person, place, and time. He appears well-developed and well-nourished.   Pulmonary/Chest: No respiratory distress.   Neurological: He is alert and oriented to person, place, and time.   Psychiatric: He has a normal mood and affect.       All tests have been reviewed.    Assessment/Plan   Diagnoses and all orders for this visit:    Anemia, unspecified type    Steroid-dependent COPD (CMS/HCC)    Other orders  -     levoFLOXacin (Levaquin) 500 MG tablet; Take 1 tablet by mouth Daily.             COPD exacerbation initiate Levaquin.  Patient tolerated Levaquin in the past no allergic reaction.  Continue nebulizer oxygen.  Call if no better  Anemia follow-up with nephrologist  Bruise easy probably due to Plavix and aspirin continue to watch for now    13'

## 2020-07-14 ENCOUNTER — TELEPHONE (OUTPATIENT)
Dept: INFUSION THERAPY | Facility: HOSPITAL | Age: 70
End: 2020-07-14

## 2020-07-15 ENCOUNTER — HOSPITAL ENCOUNTER (OUTPATIENT)
Dept: INFUSION THERAPY | Facility: HOSPITAL | Age: 70
Discharge: HOME | End: 2020-07-15
Admitting: PHYSICIAN ASSISTANT

## 2020-07-15 VITALS
RESPIRATION RATE: 22 BRPM | WEIGHT: 168 LBS | DIASTOLIC BLOOD PRESSURE: 65 MMHG | SYSTOLIC BLOOD PRESSURE: 144 MMHG | HEIGHT: 67 IN | HEART RATE: 74 BPM | BODY MASS INDEX: 26.37 KG/M2 | TEMPERATURE: 98.4 F | OXYGEN SATURATION: 94 %

## 2020-07-15 DIAGNOSIS — D64.9 ANEMIA, UNSPECIFIED TYPE: Primary | ICD-10-CM

## 2020-07-15 DIAGNOSIS — N18.9 CHRONIC KIDNEY DISEASE, UNSPECIFIED CKD STAGE: ICD-10-CM

## 2020-07-15 LAB
DEPRECATED RDW RBC AUTO: 47.6 FL (ref 37–54)
ERYTHROCYTE [DISTWIDTH] IN BLOOD BY AUTOMATED COUNT: 13.4 % (ref 12.3–15.4)
HCT VFR BLD AUTO: 37.4 % (ref 37.5–51)
HGB BLD-MCNC: 11.6 G/DL (ref 13–17.7)
MCH RBC QN AUTO: 29.8 PG (ref 26.6–33)
MCHC RBC AUTO-ENTMCNC: 31 G/DL (ref 31.5–35.7)
MCV RBC AUTO: 96.1 FL (ref 79–97)
PLATELET # BLD AUTO: 184 10*3/MM3 (ref 140–450)
PMV BLD AUTO: 8.5 FL (ref 6–12)
RBC # BLD AUTO: 3.89 10*6/MM3 (ref 4.14–5.8)
WBC # BLD AUTO: 7.18 10*3/MM3 (ref 3.4–10.8)

## 2020-07-15 PROCEDURE — G0463 HOSPITAL OUTPT CLINIC VISIT: HCPCS

## 2020-07-15 PROCEDURE — 36415 COLL VENOUS BLD VENIPUNCTURE: CPT

## 2020-07-15 PROCEDURE — 85027 COMPLETE CBC AUTOMATED: CPT | Performed by: PHYSICIAN ASSISTANT

## 2020-07-15 RX ORDER — PNV NO.95/FERROUS FUM/FOLIC AC 28MG-0.8MG
TABLET ORAL
Qty: 28 TABLET | Refills: 5 | Status: SHIPPED | OUTPATIENT
Start: 2020-07-15 | End: 2020-01-01

## 2020-07-15 NOTE — PATIENT INSTRUCTIONS
Epoetin Philip injection  What is this medicine?  EPOETIN PHILIP (e KYRIE e tin AL fa) helps your body make more red blood cells. This medicine is used to treat anemia caused by chronic kidney disease, cancer chemotherapy, or HIV-therapy. It may also be used before surgery if you have anemia.  This medicine may be used for other purposes; ask your health care provider or pharmacist if you have questions.  COMMON BRAND NAME(S): Epogen, Procrit, Retacrit  What should I tell my health care provider before I take this medicine?  They need to know if you have any of these conditions:  · cancer  · heart disease  · high blood pressure  · history of blood clots  · history of stroke  · low levels of folate, iron, or vitamin B12 in the blood  · seizures  · an unusual or allergic reaction to erythropoietin, albumin, benzyl alcohol, hamster proteins, other medicines, foods, dyes, or preservatives  · pregnant or trying to get pregnant  · breast-feeding  How should I use this medicine?  This medicine is for injection into a vein or under the skin. It is usually given by a health care professional in a hospital or clinic setting.  If you get this medicine at home, you will be taught how to prepare and give this medicine. Use exactly as directed. Take your medicine at regular intervals. Do not take your medicine more often than directed.  It is important that you put your used needles and syringes in a special sharps container. Do not put them in a trash can. If you do not have a sharps container, call your pharmacist or healthcare provider to get one.  A special MedGuide will be given to you by the pharmacist with each prescription and refill. Be sure to read this information carefully each time.  Talk to your pediatrician regarding the use of this medicine in children. While this drug may be prescribed for selected conditions, precautions do apply.  Overdosage: If you think you have taken too much of this medicine contact a poison  control center or emergency room at once.  NOTE: This medicine is only for you. Do not share this medicine with others.  What if I miss a dose?  If you miss a dose, take it as soon as you can. If it is almost time for your next dose, take only that dose. Do not take double or extra doses.  What may interact with this medicine?  Interactions have not been studied.  This list may not describe all possible interactions. Give your health care provider a list of all the medicines, herbs, non-prescription drugs, or dietary supplements you use. Also tell them if you smoke, drink alcohol, or use illegal drugs. Some items may interact with your medicine.  What should I watch for while using this medicine?  Your condition will be monitored carefully while you are receiving this medicine.  You may need blood work done while you are taking this medicine.  This medicine may cause a decrease in vitamin B6. You should make sure that you get enough vitamin B6 while you are taking this medicine. Discuss the foods you eat and the vitamins you take with your health care professional.  What side effects may I notice from receiving this medicine?  Side effects that you should report to your doctor or health care professional as soon as possible:  · allergic reactions like skin rash, itching or hives, swelling of the face, lips, or tongue  · seizures  · signs and symptoms of a blood clot such as breathing problems; changes in vision; chest pain; severe, sudden headache; pain, swelling, warmth in the leg; trouble speaking; sudden numbness or weakness of the face, arm or leg  · signs and symptoms of a stroke like changes in vision; confusion; trouble speaking or understanding; severe headaches; sudden numbness or weakness of the face, arm or leg; trouble walking; dizziness; loss of balance or coordination  Side effects that usually do not require medical attention (report to your doctor or health care professional if they continue or are  bothersome):  · chills  · cough  · dizziness  · fever  · headaches  · joint pain  · muscle cramps  · muscle pain  · nausea, vomiting  · pain, redness, or irritation at site where injected  This list may not describe all possible side effects. Call your doctor for medical advice about side effects. You may report side effects to FDA at 4-481-PPR-1234.  Where should I keep my medicine?  Keep out of the reach of children.  Store in a refrigerator between 2 and 8 degrees C (36 and 46 degrees F). Do not freeze or shake. Throw away any unused portion if using a single-dose vial. Multi-dose vials can be kept in the refrigerator for up to 21 days after the initial dose. Throw away unused medicine.  NOTE: This sheet is a summary. It may not cover all possible information. If you have questions about this medicine, talk to your doctor, pharmacist, or health care provider.  © 2020 [employer] (2018-07-27 08:35:19)

## 2020-07-17 RX ORDER — BENZONATATE 200 MG/1
CAPSULE ORAL
Qty: 40 CAPSULE | Refills: 0 | Status: SHIPPED | OUTPATIENT
Start: 2020-07-17 | End: 2020-01-01

## 2020-07-23 ENCOUNTER — TELEMEDICINE (OUTPATIENT)
Dept: INTERNAL MEDICINE | Facility: CLINIC | Age: 70
End: 2020-07-23

## 2020-07-23 DIAGNOSIS — N18.9 CHRONIC KIDNEY DISEASE, UNSPECIFIED CKD STAGE: ICD-10-CM

## 2020-07-23 DIAGNOSIS — J44.1 COPD WITH EXACERBATION (HCC): ICD-10-CM

## 2020-07-23 DIAGNOSIS — D64.9 ANEMIA, UNSPECIFIED TYPE: ICD-10-CM

## 2020-07-23 DIAGNOSIS — J44.9 STEROID-DEPENDENT COPD (HCC): ICD-10-CM

## 2020-07-23 DIAGNOSIS — K21.9 GERD WITHOUT ESOPHAGITIS: ICD-10-CM

## 2020-07-23 DIAGNOSIS — J96.12 CHRONIC RESPIRATORY FAILURE WITH HYPOXIA AND HYPERCAPNIA (HCC): ICD-10-CM

## 2020-07-23 DIAGNOSIS — I48.0 PAROXYSMAL ATRIAL FIBRILLATION (HCC): Primary | ICD-10-CM

## 2020-07-23 DIAGNOSIS — I10 ESSENTIAL HYPERTENSION: ICD-10-CM

## 2020-07-23 DIAGNOSIS — I25.119 CORONARY ARTERY DISEASE INVOLVING NATIVE CORONARY ARTERY OF NATIVE HEART WITH ANGINA PECTORIS (HCC): ICD-10-CM

## 2020-07-23 DIAGNOSIS — G47.33 OSA ON CPAP: ICD-10-CM

## 2020-07-23 DIAGNOSIS — Z99.89 OSA ON CPAP: ICD-10-CM

## 2020-07-23 DIAGNOSIS — J96.11 CHRONIC RESPIRATORY FAILURE WITH HYPOXIA AND HYPERCAPNIA (HCC): ICD-10-CM

## 2020-07-23 DIAGNOSIS — E78.2 MIXED HYPERLIPIDEMIA: ICD-10-CM

## 2020-07-23 PROCEDURE — 99214 OFFICE O/P EST MOD 30 MIN: CPT | Performed by: INTERNAL MEDICINE

## 2020-07-23 RX ORDER — AMLODIPINE BESYLATE 5 MG/1
5 TABLET ORAL DAILY
Qty: 30 TABLET | Refills: 2 | Status: SHIPPED | OUTPATIENT
Start: 2020-07-23 | End: 2020-01-01

## 2020-07-23 RX ORDER — AMLODIPINE BESYLATE 5 MG/1
5 TABLET ORAL DAILY
Qty: 30 TABLET | Refills: 2 | Status: SHIPPED | OUTPATIENT
Start: 2020-07-23 | End: 2020-07-23 | Stop reason: SDUPTHER

## 2020-07-23 RX ORDER — IPRATROPIUM BROMIDE AND ALBUTEROL SULFATE 2.5; .5 MG/3ML; MG/3ML
3 SOLUTION RESPIRATORY (INHALATION) EVERY 4 HOURS PRN
Qty: 360 ML | Refills: 11 | Status: SHIPPED | OUTPATIENT
Start: 2020-07-23 | End: 2020-01-01 | Stop reason: SDUPTHER

## 2020-07-23 NOTE — PROGRESS NOTES
Subjective   Jani Pena is a 70 y.o. male.     Chief Complaint   Patient presents with   • Atrial Fibrillation     1 mo f/u   • COPD     This was an audio and video enabled telemedicine encounter.          History of Present Illness video visit  Patient here for follow-up.  Patient states her blood pressure is elevated recently.  Patient's blood pressure medicine was cut down a few weeks ago due to low blood pressure but now is around the 150 range constantly.  Anemia improved.  Chronic kidney disease is stable.  COPD stable oxygen patient requests DuoNeb refill.  Denies any worsening of short of breath.  Recent COPD exacerbation bronchitis again Levaquin helped.  Denies any depression anxiety.    Current Outpatient Medications:   •  alfuzosin (UROXATRAL) 10 MG 24 hr tablet, @@ TAKE ONE TABLET BY MOUTH DAILY, Disp: 28 tablet, Rfl: 5  •  amLODIPine (NORVASC) 5 MG tablet, Take 1 tablet by mouth Daily., Disp: 30 tablet, Rfl: 2  •  ASPIRIN LOW DOSE 81 MG chewable tablet, @@ TAKE ONE TABLET BY MOUTH DAILY, Disp: 28 tablet, Rfl: 5  •  atorvastatin (LIPITOR) 10 MG tablet, Take 1 tablet by mouth Every Evening., Disp: 90 tablet, Rfl: 3  •  BENADRYL ITCH STOPPING 1-0.1 % cream, APPLY TOPICALLY TO AFFECTED AREA THREE TIMES A DAY AS NEEDED FOR ITCHING, Disp: 28.3 g, Rfl: 5  •  benzonatate (TESSALON) 200 MG capsule, TAKE ONE CAPSULE BY MOUTH THREE TIMES A DAY AS NEEDED FOR COUGH, Disp: 40 capsule, Rfl: 0  •  BREO ELLIPTA 200-25 MCG/INH inhaler, 2 puffs Daily., Disp: , Rfl:   •  carboxymethylcellulose (REFRESH PLUS) 0.5 % solution, Administer 1 drop to both eyes 3 (Three) Times a Day As Needed for Dry Eyes., Disp: 1 bottle, Rfl: 0  •  clopidogrel (PLAVIX) 75 MG tablet, TAKE ONE TABLET BY MOUTH DAILY, Disp: 90 tablet, Rfl: 3  •  cyclobenzaprine (FLEXERIL) 5 MG tablet, Take 1 tablet by mouth 3 (Three) Times a Day As Needed for Muscle Spasms., Disp: 90 tablet, Rfl: 3  •  diphenhydrAMINE (BENADRYL) 2 % cream, Apply  topically  to the appropriate area as directed 3 (Three) Times a Day As Needed for Itching., Disp: 15 g, Rfl: 0  •  diphenhydrAMINE (BENADRYL) 2 % cream, Apply  topically to the appropriate area as directed 3 (Three) Times a Day As Needed for Itching., Disp: 15 g, Rfl: 3  •  docusate sodium (COLACE) 100 MG capsule, Take 1 capsule by mouth 2 (Two) Times a Day., Disp: 180 capsule, Rfl: 3  •  escitalopram (LEXAPRO) 20 MG tablet, Take 1 tablet by mouth Daily., Disp: 90 tablet, Rfl: 3  •  ferrous sulfate 325 (65 Fe) MG tablet, TAKE ONE TABLET BY MOUTH DAILY, Disp: 28 tablet, Rfl: 5  •  finasteride (PROSCAR) 5 MG tablet, TAKE ONE TABLET BY MOUTH DAILY, Disp: 90 tablet, Rfl: 3  •  fluocinonide-emollient (LIDEX-E) 0.05 % cream, Apply  topically to the appropriate area as directed 2 (Two) Times a Day., Disp: 60 g, Rfl: 0  •  Fluticasone-Umeclidin-Vilant 100-62.5-25 MCG/INH aerosol powder , Inhale 1 puff Daily., Disp: 28 each, Rfl: 1  •  furosemide (LASIX) 20 MG tablet, HOLD LASIX UNTIL FURTHER NOTICE, Disp: 30 tablet, Rfl: 2  •  hydrocortisone 1 % cream, Apply  topically to the appropriate area as directed 2 (Two) Times a Day., Disp: 1 each, Rfl: 3  •  ipratropium (Atrovent) 0.06 % nasal spray, 2 sprays into the nostril(s) as directed by provider 4 (Four) Times a Day., Disp: 15 mL, Rfl: 1  •  levoFLOXacin (Levaquin) 500 MG tablet, Take 1 tablet by mouth Daily., Disp: 10 tablet, Rfl: 0  •  lidocaine (LIDODERM) 5 %, PLACE ONE PATCH ON SKIN AS DIRECTED,REMOVE & DISCARD PATCH WITHIN 12 HOURS, Disp: 30 patch, Rfl: 3  •  lisinopril (PRINIVIL,ZESTRIL) 5 MG tablet, TAKE ONE TABLET BY MOUTH DAILY, Disp: 90 tablet, Rfl: 3  •  melatonin 5 MG tablet tablet, TAKE 1 TABLET BY MOUTH EVERY EVENING, Disp: 28 tablet, Rfl: 5  •  mirtazapine (REMERON) 15 MG tablet, TAKE 1 TABLET BY MOUTH DAILY AT BEDTIME, Disp: 28 tablet, Rfl: 5  •  O2 (OXYGEN), Inhale 4 L/min 1 (One) Time. Walking on 4L and sitting 3L, Disp: , Rfl:   •  oxybutynin (DITROPAN) 5 MG tablet,  OAKE 1 TABLET BY MOUTH DAILY AT BEDTIME, Disp: 90 tablet, Rfl: 3  •  PAIN RELIEF EXTRA STRENGTH 500 MG tablet, TAKE ONE TABLET BY MOUTH THREE TIMES A DAY AS NEEDED, Disp: 90 tablet, Rfl: 0  •  pantoprazole (PROTONIX) 40 MG EC tablet, TAKE ONE TABLET BY MOUTH DAILY, Disp: 28 tablet, Rfl: 5  •  predniSONE (DELTASONE) 10 MG tablet, Take 1 tablet by mouth Daily., Disp: 30 tablet, Rfl: 1  •  traZODone (DESYREL) 50 MG tablet, TAKE 1 TABLET BY MOUTH EVERY EVENING, Disp: 28 tablet, Rfl: 5  •  ipratropium-albuterol (DUO-NEB) 0.5-2.5 mg/3 ml nebulizer, Take 3 mL by nebulization Every 4 (Four) Hours As Needed for Wheezing., Disp: 360 mL, Rfl: 11    The following portions of the patient's history were reviewed and updated as appropriate: allergies, current medications, past family history, past medical history, past social history, past surgical history and problem list.    Review of Systems   Constitutional: Negative.    Respiratory: Positive for cough and shortness of breath.    Cardiovascular: Negative.    Gastrointestinal: Negative.    Musculoskeletal: Negative.    Skin: Negative.    Neurological: Negative.    Psychiatric/Behavioral: Negative.        Objective   Physical Exam   Constitutional: He is oriented to person, place, and time. He appears well-developed and well-nourished.   Pulmonary/Chest: Effort normal.   Neurological: He is alert and oriented to person, place, and time.       All tests have been reviewed.    Assessment/Plan   Diagnoses and all orders for this visit:    Paroxysmal atrial fibrillation (CMS/[facility]) continue medication    Mixed hyperlipidemia continue medication    Coronary artery disease involving native coronary artery of native heart with angina pectoris (CMS/[facility]) continue medication    Steroid-dependent COPD (CMS/[facility]) continue medication    COPD with exacerbation (CMS/[facility]) refill DuoNeb    PEDRO on CPAP    Chronic respiratory failure with hypoxia and hypercapnia (CMS/[facility])    GERD without esophagitis  continue medication    Chronic kidney disease, unspecified CKD stage follow-up with nephrologist is stable now    Anemia, unspecified type stable now    Essential hypertension increase her Norvasc from 2.5 mg to 5 mg  -     amLODIPine (NORVASC) 5 MG tablet; Take 1 tablet by mouth Daily.    Other orders  -     ipratropium-albuterol (DUO-NEB) 0.5-2.5 mg/3 ml nebulizer; Take 3 mL by nebulization Every 4 (Four) Hours As Needed for Wheezing.    1 month follow-up again          Below is to historical records for reference only: Chronic diastolic congestive heart failure (CMS/HCC) stable now  Coronary artery disease cath negative  SSS, s/p Cardiac pacemaker in situ follow cardio  Steroid-dependent COPD (CMS/HCC),  continue present mx recent exacerbation, improved after medication.   Esophageal dysphagia, s/p dilation, still dysphagia. GERD without esophagitis, UGI NSD,  follow up with GI,   Lumbar radiculopathy stable   Chronic kidney disease, stage III (moderate) (CMS/HCC) watch   Left neck pain continue baclofen tylenol and PT  osteopenia, oscal D 500mg bid continue  left low back pain resolved follow up with ortho s/p left hip surgery for severe OA doing well.   divertic hemorroid on colon 1/2013  DM stable without med  phimosis seen by uro declined surgery  Hematuria followup with urologist s/p cystoscope bladder stones  Hypertension continue med  BPH enlarged on CT scan follow up with urologist.  Anemia workup all negative.? anemia of chronic disease due to chronic disease   History of the CAD atrial fibrillation VSD status post occlusion stable now. Status post a defibrillator seen by cardio follow up with cardio, occa chest pain follow cardio  History of a TIA patient is on Plavix continue  pneumovax done, prevnar 13 1/19/17, Td to HD.flushot done, shingrix informed  Depression anxiety cont lexapro to 20 daily and cont xanax prn  Decline to disrobe for PE  right inquinal hernia decline surgery now, call if  worse  BLE edema mild, watch for now, cut down salt, avoid soda  Gall stone without sx  Rt flank pain do UA and US  CKD follow up with renal

## 2020-08-03 ENCOUNTER — OFFICE VISIT (OUTPATIENT)
Dept: PULMONOLOGY | Facility: CLINIC | Age: 70
End: 2020-08-03

## 2020-08-03 VITALS
SYSTOLIC BLOOD PRESSURE: 122 MMHG | DIASTOLIC BLOOD PRESSURE: 72 MMHG | HEART RATE: 86 BPM | OXYGEN SATURATION: 94 % | RESPIRATION RATE: 18 BRPM | WEIGHT: 173 LBS | BODY MASS INDEX: 27.15 KG/M2 | HEIGHT: 67 IN

## 2020-08-03 DIAGNOSIS — Z87.891 PERSONAL HISTORY OF TOBACCO USE, PRESENTING HAZARDS TO HEALTH: ICD-10-CM

## 2020-08-03 DIAGNOSIS — J96.11 CHRONIC RESPIRATORY FAILURE WITH HYPOXIA AND HYPERCAPNIA (HCC): ICD-10-CM

## 2020-08-03 DIAGNOSIS — J44.1 ACUTE EXACERBATION OF CHRONIC OBSTRUCTIVE PULMONARY DISEASE (COPD) (HCC): Primary | ICD-10-CM

## 2020-08-03 DIAGNOSIS — R09.02 HYPOXIA: ICD-10-CM

## 2020-08-03 DIAGNOSIS — R06.02 SHORTNESS OF BREATH: ICD-10-CM

## 2020-08-03 DIAGNOSIS — J96.12 CHRONIC RESPIRATORY FAILURE WITH HYPOXIA AND HYPERCAPNIA (HCC): ICD-10-CM

## 2020-08-03 PROCEDURE — 96372 THER/PROPH/DIAG INJ SC/IM: CPT | Performed by: INTERNAL MEDICINE

## 2020-08-03 PROCEDURE — 95012 NITRIC OXIDE EXP GAS DETER: CPT | Performed by: INTERNAL MEDICINE

## 2020-08-03 PROCEDURE — 99214 OFFICE O/P EST MOD 30 MIN: CPT | Performed by: INTERNAL MEDICINE

## 2020-08-03 RX ORDER — METHYLPREDNISOLONE ACETATE 80 MG/ML
80 INJECTION, SUSPENSION INTRA-ARTICULAR; INTRALESIONAL; INTRAMUSCULAR; SOFT TISSUE ONCE
Status: COMPLETED | OUTPATIENT
Start: 2020-08-03 | End: 2020-08-03

## 2020-08-03 RX ORDER — AMOXICILLIN AND CLAVULANATE POTASSIUM 875; 125 MG/1; MG/1
1 TABLET, FILM COATED ORAL 2 TIMES DAILY
Qty: 10 TABLET | Refills: 0 | Status: SHIPPED | OUTPATIENT
Start: 2020-08-03 | End: 2020-08-08

## 2020-08-03 RX ADMIN — METHYLPREDNISOLONE ACETATE 80 MG: 80 INJECTION, SUSPENSION INTRA-ARTICULAR; INTRALESIONAL; INTRAMUSCULAR; SOFT TISSUE at 10:40

## 2020-08-03 NOTE — PROGRESS NOTES
FOLLOW UP NOTE    Requested by:   Roxi Mi, *   Miguel Rojas MD      Chief Complaint   Patient presents with   • Follow-up   • Breathing Problem       Subjective:  Jani Pena is a 70 y.o. male.     History of Present Illness   Patient was evaluated today complaining of shortness of breath, cough and phlegm production which has been worse for the past few days.    he is not complaining of subjective chills.  The patient also denies fever.    The patient says that his sputum is mostly productive of thick yellowish sputum.     Patient is using rescue inhaler/nebulizer more than usual with some relief.    Quit smoking 3 years ago.    The patient says that he is using oxygen as prescribed and feels that it appears to be helping some of his symptoms.    Patient also has chronic respiratory failure and he doesn't report any issues with the noninvasive ventilation device. Patient says that he is having no issues with his current mask.       The following portions of the patient's history were reviewed and updated as appropriate: allergies, current medications, past family history, past medical history, past social history and past surgical history.    Review of Systems   Constitutional: Positive for fatigue. Negative for chills and fever.   HENT: Positive for trouble swallowing. Negative for rhinorrhea, sinus pressure, sinus pain, sneezing, sore throat and voice change.    Respiratory: Positive for cough, shortness of breath and wheezing. Negative for chest tightness.    Cardiovascular: Positive for chest pain. Negative for palpitations and leg swelling.   Genitourinary: Positive for difficulty urinating.   Psychiatric/Behavioral: Positive for sleep disturbance.   All other systems reviewed and are negative.      Past Medical History:   Diagnosis Date   • Abnormal liver enzymes    • Anemia    • Anxiety    • Arthritis    • Atherosclerotic heart disease of native coronary artery without angina pectoris   "  • Atrial fibrillation (CMS/HCC)    • Back pain    • BPH (benign prostatic hyperplasia)    • Cataract, bilateral    • Chest pain     2-3 MONTHS AGO.  PER PT, HAS ADDRESSED WITH CARDIOLOGIST.  STATES HAS NTG PRN.   • CHF (congestive heart failure) (CMS/HCC)    • Chronic bronchitis (CMS/HCC)    • Chronic kidney disease    • COPD (chronic obstructive pulmonary disease) (CMS/HCC)    • Degeneration of cervical intervertebral disc    • Depression    • Diverticulosis    • Dyspnea    • Esophageal reflux    • Esophagitis    • Gastritis    • Hematuria    • Hemorrhoids    • Hiatal hernia    • History of arthritis    • History of nuclear stress test     UNSURE OF DATE   • History of peripheral neuropathy    • History of ventricular septal defect    • History of ventricular septal defect    • Hyperlipidemia    • Hypertension    • Impaired functional mobility, balance, gait, and endurance    • Infectious diarrhea    • Kyphosis, acquired    • Lumbar radiculopathy    • Mitral and aortic valve disease    • Nephrolithiasis    • Phimosis    • Problems with swallowing     WITH FOOD   • Respiratory failure (CMS/HCC)    • Sleep apnea     BIPAP   • TIA (transient ischemic attack)     X3.   2014   • Ventral hernia    • Wears glasses     FOR READING       Social History     Tobacco Use   • Smoking status: Former Smoker     Packs/day: 3.00     Years: 30.00     Pack years: 90.00     Types: Cigarettes     Last attempt to quit: 2017     Years since quitting: 3.5   • Smokeless tobacco: Never Used   Substance Use Topics   • Alcohol use: No       Objective:  Visit Vitals  /72   Pulse 86   Resp 18   Ht 170.2 cm (67\")   Wt 78.5 kg (173 lb)   SpO2 94% Comment: resting on 3 lpm   BMI 27.10 kg/m²       Physical Exam   Constitutional: He is oriented to person, place, and time. He appears well-developed and well-nourished.   Eyes: EOM are normal.   Neck: Normal range of motion. No JVD present. No thyromegaly present.   Cardiovascular: Normal rate. " "  PPM noted on the left.   Another \"device\" noted on the right.    Pulmonary/Chest: He is in respiratory distress. He has wheezes.   Musculoskeletal: Normal range of motion.   Used a walker.    Neurological: He is alert and oriented to person, place, and time.   Skin: Skin is warm and dry.   Psychiatric: He has a normal mood and affect. His behavior is normal.   Vitals reviewed.      Assessment/Plan:  Jani was seen today for follow-up and breathing problem.    Diagnoses and all orders for this visit:    Acute exacerbation of chronic obstructive pulmonary disease (COPD) (CMS/[facility])  -     methylPREDNISolone acetate (DEPO-medrol) injection 80 mg    Shortness of breath  -     Pulmonary Function Test; Future  -     Nitric Oxide    Hypoxia    Chronic respiratory failure with hypoxia and hypercapnia (CMS/[facility])    Personal history of tobacco use, presenting hazards to health  -     CT Chest Low Dose Wo; Future    Other orders  -     amoxicillin-clavulanate (AUGMENTIN) 875-125 MG per tablet; Take 1 tablet by mouth 2 (Two) Times a Day for 5 days.        Return in about 15 weeks (around 11/16/2020) for Recheck, Imaging, PFT F/U, For Yaquelin, ....Also 8-9 mths w/ Dr. Toribio.    DISCUSSION(if any):  FeNO level was 18 today.    Lab Results   Component Value Date    PHART 7.406 12/26/2019    EKS7ORU 56.0 (C) 12/26/2019    PO2ART 93.8 12/26/2019    HGBBG 10.9 (L) 12/26/2019    P2CUJHEA 98.0 12/26/2019    FCOHB 4.5 08/10/2016    CARBOXYHGB 0.8 12/26/2019    FMETHB 0.3 08/10/2016       For the symptoms of acute exacerbation of COPD, he was prescribed intramuscular steroids.    Patient will be prescribed Augmentin 875 mg PO BID x 5 days, since he does appear to have purulent sputum.    The patient was asked to start using his rescue medications on a more regular basis for the next few days.    Side effects have been discussed in detail.    Patient was asked to report to the emergency room if symptoms do not improve.    All other " medications will remain the same.    He was asked not to use Trelegy and Breo together.     He is already on Prednisone 10 mg daily.    Continue treatment with noninvasive ventilation device at the current setting.    he is currently using full facemask.    Patient seems to be compliant with BiPAP device, based on the available data and his account of improved symptoms.     Repeat ABG will be considered, if clinically necessary.    The patient was advised to continue oxygen 24/7, since he has noticed improvement in some symptoms since using it as prescribed.    The following have been ordered, either before or at the time of the next visit.  CT of the chest  PFTs/spirometry    Dictated utilizing Dragon dictation.    This document was electronically signed by Slim Toribio MD on 08/03/20 at 10:32

## 2020-08-11 ENCOUNTER — TELEPHONE (OUTPATIENT)
Dept: INFUSION THERAPY | Facility: HOSPITAL | Age: 70
End: 2020-08-11

## 2020-08-21 NOTE — PROGRESS NOTES
Subjective   Jani Pena is a 70 y.o. male.     No chief complaint on file.      History of Present Illness video visit  Patient has a history of emphysema end-stage lung disease renal insufficiency complaints 5 days of facial edema and lower extremity edema.  History of  water pill use.  Denies any worsening of her short of breath or chest pain.  No pain no itchy.  No fever no chills.    Current Outpatient Medications:   •  alfuzosin (UROXATRAL) 10 MG 24 hr tablet, @@ TAKE ONE TABLET BY MOUTH DAILY, Disp: 28 tablet, Rfl: 5  •  amLODIPine (NORVASC) 5 MG tablet, Take 1 tablet by mouth Daily., Disp: 30 tablet, Rfl: 2  •  ASPIRIN LOW DOSE 81 MG chewable tablet, @@ TAKE ONE TABLET BY MOUTH DAILY, Disp: 28 tablet, Rfl: 5  •  atorvastatin (LIPITOR) 10 MG tablet, Take 1 tablet by mouth Every Evening., Disp: 90 tablet, Rfl: 3  •  BENADRYL ITCH STOPPING 1-0.1 % cream, APPLY TOPICALLY TO AFFECTED AREA THREE TIMES A DAY AS NEEDED FOR ITCHING, Disp: 28.3 g, Rfl: 5  •  benzonatate (TESSALON) 200 MG capsule, TAKE ONE CAPSULE BY MOUTH THREE TIMES A DAY AS NEEDED FOR COUGH, Disp: 40 capsule, Rfl: 0  •  carboxymethylcellulose (REFRESH PLUS) 0.5 % solution, Administer 1 drop to both eyes 3 (Three) Times a Day As Needed for Dry Eyes., Disp: 1 bottle, Rfl: 0  •  clopidogrel (PLAVIX) 75 MG tablet, TAKE ONE TABLET BY MOUTH DAILY, Disp: 90 tablet, Rfl: 3  •  cyclobenzaprine (FLEXERIL) 5 MG tablet, Take 1 tablet by mouth 3 (Three) Times a Day As Needed for Muscle Spasms., Disp: 90 tablet, Rfl: 3  •  diphenhydrAMINE (BENADRYL) 2 % cream, Apply  topically to the appropriate area as directed 3 (Three) Times a Day As Needed for Itching., Disp: 15 g, Rfl: 0  •  diphenhydrAMINE (BENADRYL) 2 % cream, Apply  topically to the appropriate area as directed 3 (Three) Times a Day As Needed for Itching., Disp: 15 g, Rfl: 3  •  docusate sodium (COLACE) 100 MG capsule, Take 1 capsule by mouth 2 (Two) Times a Day., Disp: 180 capsule, Rfl: 3  •   escitalopram (LEXAPRO) 20 MG tablet, Take 1 tablet by mouth Daily., Disp: 90 tablet, Rfl: 3  •  ferrous sulfate 325 (65 Fe) MG tablet, TAKE ONE TABLET BY MOUTH DAILY, Disp: 28 tablet, Rfl: 5  •  finasteride (PROSCAR) 5 MG tablet, TAKE ONE TABLET BY MOUTH DAILY, Disp: 90 tablet, Rfl: 3  •  fluocinonide-emollient (LIDEX-E) 0.05 % cream, Apply  topically to the appropriate area as directed 2 (Two) Times a Day., Disp: 60 g, Rfl: 0  •  Fluticasone-Umeclidin-Vilant 100-62.5-25 MCG/INH aerosol powder , Inhale 1 puff Daily., Disp: 28 each, Rfl: 1  •  furosemide (Lasix) 20 MG tablet, Take 1 tablet by mouth Daily., Disp: 30 tablet, Rfl: 1  •  hydrocortisone 1 % cream, Apply  topically to the appropriate area as directed 2 (Two) Times a Day., Disp: 1 each, Rfl: 3  •  ipratropium (Atrovent) 0.06 % nasal spray, 2 sprays into the nostril(s) as directed by provider 4 (Four) Times a Day., Disp: 15 mL, Rfl: 1  •  ipratropium-albuterol (DUO-NEB) 0.5-2.5 mg/3 ml nebulizer, Take 3 mL by nebulization Every 4 (Four) Hours As Needed for Wheezing., Disp: 360 mL, Rfl: 11  •  levoFLOXacin (Levaquin) 500 MG tablet, Take 1 tablet by mouth Daily., Disp: 10 tablet, Rfl: 0  •  lidocaine (LIDODERM) 5 %, PLACE ONE PATCH ON SKIN AS DIRECTED,REMOVE & DISCARD PATCH WITHIN 12 HOURS, Disp: 30 patch, Rfl: 3  •  lisinopril (PRINIVIL,ZESTRIL) 5 MG tablet, TAKE ONE TABLET BY MOUTH DAILY, Disp: 90 tablet, Rfl: 3  •  melatonin 5 MG tablet tablet, TAKE 1 TABLET BY MOUTH EVERY EVENING, Disp: 28 tablet, Rfl: 5  •  mirtazapine (REMERON) 15 MG tablet, TAKE 1 TABLET BY MOUTH DAILY AT BEDTIME, Disp: 28 tablet, Rfl: 5  •  O2 (OXYGEN), Inhale 4 L/min 1 (One) Time. Walking on 4L and sitting 3L, Disp: , Rfl:   •  oxybutynin (DITROPAN) 5 MG tablet, OAKE 1 TABLET BY MOUTH DAILY AT BEDTIME, Disp: 90 tablet, Rfl: 3  •  PAIN RELIEF EXTRA STRENGTH 500 MG tablet, TAKE ONE TABLET BY MOUTH THREE TIMES A DAY AS NEEDED, Disp: 90 tablet, Rfl: 0  •  pantoprazole (PROTONIX) 40 MG EC  tablet, TAKE ONE TABLET BY MOUTH DAILY, Disp: 28 tablet, Rfl: 5  •  potassium chloride (K-DUR,KLOR-CON) 10 MEQ CR tablet, Take 1 tablet by mouth Daily., Disp: 30 tablet, Rfl: 1  •  predniSONE (DELTASONE) 10 MG tablet, Take 1 tablet by mouth Daily., Disp: 30 tablet, Rfl: 1  •  traZODone (DESYREL) 50 MG tablet, TAKE 1 TABLET BY MOUTH EVERY EVENING, Disp: 28 tablet, Rfl: 5    The following portions of the patient's history were reviewed and updated as appropriate: allergies, current medications, past family history, past medical history, past social history, past surgical history and problem list.    Review of Systems   Constitutional: Negative.    Respiratory: Positive for cough and shortness of breath (baseline).    Cardiovascular: Positive for leg swelling.   Gastrointestinal: Negative.    Musculoskeletal: Negative.    Skin: Negative.  Negative for color change, rash and wound.   Neurological: Negative.    Psychiatric/Behavioral: Negative.        Objective   Physical Exam   Constitutional: He is oriented to person, place, and time. He appears well-developed and well-nourished.   Pulmonary/Chest:   Baseline respiratory effort   Neurological: He is alert and oriented to person, place, and time.   Psychiatric: He has a normal mood and affect.       All tests have been reviewed.    Assessment/Plan   Diagnoses and all orders for this visit:    Edema, unspecified type  -     Comprehensive Metabolic Panel  -     TSH  -     CBC & Differential    Other orders  -     potassium chloride (K-DUR,KLOR-CON) 10 MEQ CR tablet; Take 1 tablet by mouth Daily.  -     furosemide (Lasix) 20 MG tablet; Take 1 tablet by mouth Daily.    2 weeks follow-upAfter blood tests          You have chosen to receive care through a telehealth visit.  Do you consent to use a video/audio connection for your medical care today? YES

## 2020-08-21 NOTE — TELEPHONE ENCOUNTER
Pt states that his eyes are very swollen and red, pt also states that his ankles are red and swollen. Onset yesterday. Pt would like to know if you could call in a script for him? We do not have any openings today. Please advise?

## 2020-09-12 PROBLEM — J44.1 COPD WITH EXACERBATION (HCC): Status: RESOLVED | Noted: 2017-03-18 | Resolved: 2020-01-01

## 2020-09-12 PROBLEM — N39.0 URINARY TRACT INFECTION WITH HEMATURIA: Status: RESOLVED | Noted: 2019-08-31 | Resolved: 2020-01-01

## 2020-09-12 PROBLEM — R31.9 URINARY TRACT INFECTION WITH HEMATURIA: Status: RESOLVED | Noted: 2019-08-31 | Resolved: 2020-01-01

## 2020-09-12 NOTE — PROGRESS NOTES
Subjective   Jani Pena is a 70 y.o. male.     Chief Complaint   Patient presents with   • Labs Only     f/u on blood work        History of Present Illness   Patient here for follow-up.  COPD stable oxygen.  Patient self discontinued steroid.  Patient complains of swallow problem.  History of esophageal dilation.  Hyperlipidemia stable on medication.  CAD stable on medication.  Sleep apnea CPAP is stable.  GERD on medication.  Chronic kidney disease the patient is seeing nephrologist anemia stable now.  Hypertension stable on medication.  Low back pain stable on lidocaine patch    Current Outpatient Medications:   •  alfuzosin (UROXATRAL) 10 MG 24 hr tablet, @@ TAKE ONE TABLET BY MOUTH DAILY, Disp: 28 tablet, Rfl: 5  •  amLODIPine (NORVASC) 5 MG tablet, Take 1 tablet by mouth Daily., Disp: 30 tablet, Rfl: 2  •  ASPIRIN LOW DOSE 81 MG chewable tablet, @@ TAKE ONE TABLET BY MOUTH DAILY, Disp: 28 tablet, Rfl: 5  •  atorvastatin (LIPITOR) 10 MG tablet, Take 1 tablet by mouth Every Evening., Disp: 90 tablet, Rfl: 3  •  clopidogrel (PLAVIX) 75 MG tablet, TAKE ONE TABLET BY MOUTH DAILY, Disp: 90 tablet, Rfl: 3  •  cyclobenzaprine (FLEXERIL) 5 MG tablet, Take 1 tablet by mouth 3 (Three) Times a Day As Needed for Muscle Spasms., Disp: 90 tablet, Rfl: 3  •  docusate sodium (COLACE) 100 MG capsule, Take 1 capsule by mouth 2 (Two) Times a Day., Disp: 180 capsule, Rfl: 3  •  escitalopram (LEXAPRO) 20 MG tablet, Take 1 tablet by mouth Daily., Disp: 90 tablet, Rfl: 3  •  ferrous sulfate 325 (65 Fe) MG tablet, TAKE ONE TABLET BY MOUTH DAILY, Disp: 28 tablet, Rfl: 5  •  finasteride (PROSCAR) 5 MG tablet, TAKE ONE TABLET BY MOUTH DAILY, Disp: 90 tablet, Rfl: 3  •  Fluticasone-Umeclidin-Vilant 100-62.5-25 MCG/INH aerosol powder , Inhale 1 puff Daily., Disp: 28 each, Rfl: 1  •  furosemide (Lasix) 20 MG tablet, Take 1 tablet by mouth Daily., Disp: 30 tablet, Rfl: 1  •  ipratropium (Atrovent) 0.06 % nasal spray, 2 sprays into the  nostril(s) as directed by provider 4 (Four) Times a Day., Disp: 15 mL, Rfl: 1  •  ipratropium-albuterol (DUO-NEB) 0.5-2.5 mg/3 ml nebulizer, Take 3 mL by nebulization Every 4 (Four) Hours As Needed for Wheezing., Disp: 360 mL, Rfl: 11  •  lidocaine (LIDODERM) 5 %, PLACE ONE PATCH ON SKIN AS DIRECTED,REMOVE & DISCARD PATCH WITHIN 12 HOURS, Disp: 30 patch, Rfl: 3  •  mirtazapine (REMERON) 15 MG tablet, TAKE 1 TABLET BY MOUTH DAILY AT BEDTIME, Disp: 28 tablet, Rfl: 5  •  O2 (OXYGEN), Inhale 4 L/min 1 (One) Time. Walking on 4L and sitting 3L, Disp: , Rfl:   •  oxybutynin (DITROPAN) 5 MG tablet, OAKE 1 TABLET BY MOUTH DAILY AT BEDTIME, Disp: 90 tablet, Rfl: 3  •  pantoprazole (PROTONIX) 40 MG EC tablet, TAKE ONE TABLET BY MOUTH DAILY, Disp: 28 tablet, Rfl: 5  •  potassium chloride (K-DUR,KLOR-CON) 10 MEQ CR tablet, Take 1 tablet by mouth Daily., Disp: 30 tablet, Rfl: 1  •  carboxymethylcellulose (REFRESH PLUS) 0.5 % solution, Administer 1 drop to both eyes 3 (Three) Times a Day As Needed for Dry Eyes., Disp: 1 bottle, Rfl: 0    The following portions of the patient's history were reviewed and updated as appropriate: allergies, current medications, past family history, past medical history, past social history, past surgical history and problem list.    Review of Systems   Constitutional: Negative.    Respiratory: Positive for shortness of breath.    Cardiovascular: Negative.    Gastrointestinal: Negative.         Dysphagia   Musculoskeletal: Negative.    Skin: Negative.    Neurological: Negative.    Psychiatric/Behavioral: Negative.        Objective   Physical Exam  Constitutional:       Appearance: Normal appearance.   HENT:      Head: Normocephalic and atraumatic.   Cardiovascular:      Rate and Rhythm: Normal rate and regular rhythm.      Pulses: Normal pulses.      Heart sounds: Normal heart sounds.   Pulmonary:      Breath sounds: Wheezing present.      Comments: Resp effort   Abdominal:      General: Abdomen is  flat.      Palpations: Abdomen is soft.   Neurological:      General: No focal deficit present.      Mental Status: He is alert and oriented to person, place, and time.   Psychiatric:         Mood and Affect: Mood normal.         Behavior: Behavior normal.         All tests have been reviewed.    Assessment/Plan   Diagnoses and all orders for this visit:    Need for influenza vaccination  -     Fluad Quad 65+ yrs ([id number])              Paroxysmal atrial fibrillation (CMS/HCC) continue medication    Edema resolved after lasix follow-up with kidney function again next    Dysphagia ref to GI      Mixed hyperlipidemia continue medication     Coronary artery disease involving native coronary artery of native heart with angina pectoris (CMS/[facility]) continue medication     Steroid-dependent COPD (CMS/HCC)  resume prednisone     COPD with exacerbation (CMS/[facility]) refill DuoNeb , start prednisone 10mg daily , pending CT chest and PT follow pulm     PEDRO on CPAP continue     Chronic respiratory failure with hypoxia and hypercapnia (CMS/[facility])     GERD without esophagitis continue medication     Chronic kidney disease, unspecified CKD stage follow-up with nephrologist is stable now     Anemia, unspecified type stable now     Essential hypertension  continue medication    Low back pain continue Lidoderm        1 month follow-up again               Below is to historical records for reference only: Chronic diastolic congestive heart failure (CMS/HCC) stable now  Coronary artery disease cath negative  SSS, s/p Cardiac pacemaker in situ follow cardio  Steroid-dependent COPD (CMS/[facility]),  continue present mx recent exacerbation, improved after medication.   Esophageal dysphagia, s/p dilation, still dysphagia. GERD without esophagitis, UGI NSD,  follow up with GI,   Lumbar radiculopathy stable   Chronic kidney disease, stage III (moderate) (CMS/[facility]) watch   Left neck pain continue baclofen tylenol and PT  osteopenia, oscal D 500mg bid  continue  left low back pain resolved follow up with ortho s/p left hip surgery for severe OA doing well.   divertic hemorroid on colon 1/2013  DM stable without med  phimosis seen by uro declined surgery  Hematuria followup with urologist s/p cystoscope bladder stones  Hypertension continue med  BPH enlarged on CT scan follow up with urologist.  Anemia workup all negative.? anemia of chronic disease due to chronic disease   History of the CAD atrial fibrillation VSD status post occlusion stable now. Status post a defibrillator seen by cardio follow up with cardio, occa chest pain follow cardio  History of a TIA patient is on Plavix continue  pneumovax done, prevnar 13 1/19/17, Td to HD.flushot done, shingrix informed  Depression anxiety cont lexapro to 20 daily and cont xanax prn  Decline to disrobe for PE  right inquinal hernia decline surgery now, call if worse  BLE edema mild, watch for now, cut down salt, avoid soda  Gall stone without sx  Rt flank pain do UA and US  CKD follow up with renal

## 2020-09-14 NOTE — TELEPHONE ENCOUNTER
Can we change inhaler to Anoro or Spiriva since this is preferred on his drug plan since insurance won't cover Trelegy? Please advise?

## 2020-09-14 NOTE — TELEPHONE ENCOUNTER
[facility] CALLED STATED THAT RX TRELEGY ALLIPTA INHALER IS NEEDING PA AND WOULD LIKE TO KNOW IF IT COULD BE SWITCHED TO ANORO, SPARIVA, ETC. WHICH ARE PREFERRED PRODUCTS.     PLEASE ADVISE.  CALL BACK: [phone]      [facility] - [place], [place] - [address]

## 2020-09-17 NOTE — PATIENT INSTRUCTIONS
1. Antireflux measures: Avoid fried, fatty foods, alcohol, chocolate, coffee, tea,  soft drinks, peppermint and spearmint, spicy foods, tomatoes and tomato based foods, onion based foods, and smoking. Other antireflux measures include weight reduction if overweight, avoiding tight clothing around the abdomen, elevating the head of the bed 6 inches with blocks under the head board, and don't drink or eat before going to bed and avoid lying down immediately after meals.  2. Pantoprazole 40 mg 1 by mouth in the am 30 minutes before breakfast.  3. Dietary instructions.  The patient should eat relatively soft diet, and should eat in upright position and chew well.  The patient should drink water after 2-3 bites and take medications with liberal amounts of water.  Generally medications that have a potential to cause pill esophagitis may be avoided or used in an alternative form (for example if the patient needs potassium it may be used in a liquid rather than pill form).  Furthermore after eating and taking medications, the patient should remain in upright position for 5-10 minutes.  4. Upper endoscopy-EGD: Description of the procedure, risks, benefits, alternatives and options, including nonoperative options, were discussed with the patient in detail. The patient understands and wishes to proceed.  5. COVID-19 testing prior to procedure. The patient will need to self-quarantine after testing until the procedure. Instructions given to patient.

## 2020-09-17 NOTE — PROGRESS NOTES
"     Follow Up Note     Date: 2020   Patient Name: Jani Pena  MRN: [id number]  : 1950     Primary Care Provider: Miguel Rojas MD     Chief Complaint:    Chief Complaint   Patient presents with   • Difficulty Swallowing   • Vomiting     History of present illness:   2020  Jani Pena is a 70 y.o. male who is here today for follow up for difficulty swallowing.     The patient has a history of difficulty swallowing for several years. He has had multiple EGDs in the past with dilations with significant improvement of swallowing. About 2-3 months ago, the patient had started having difficulty swallowing solids that has gradually worsened. He has difficulty swallowing solids, no difficulty swallowing liquids. The foods will become \"stuck\" in the upper esophagus about every time he eats. No history of weight loss.    The patient denies abdominal pain, nausea or vomiting. He has a history of GERD for many years and is on PPI therapy with reasonable control. The patient denies constipation or diarrhea. There is no history of rectal bleeding.     The patient had EGD in 2019 by Dr. Oneil with gastroparesis and presbyesophagus noted. He had another EGD in [place] in 2019 and had esophagus \"stretched\" (we do not have those records). The patient had EGD in 2019 by Dr. Moe that was  \"normal\". The patient had colonoscopy in 2019 by Dr. Moe with 1 benign mucosal tag removed, otherwise unremarkable.    Subjective      Past Medical History:   Diagnosis Date   • Abnormal liver enzymes    • Anemia    • Anxiety    • Arthritis    • Atherosclerotic heart disease of native coronary artery without angina pectoris    • Atrial fibrillation (CMS/HCC)    • Back pain    • BPH (benign prostatic hyperplasia)    • Cataract, bilateral    • Chest pain     2-3 MONTHS AGO.  PER PT, HAS ADDRESSED WITH CARDIOLOGIST.  STATES HAS NTG PRN.   • CHF (congestive heart failure) " (CMS/HCC)    • Chronic bronchitis (CMS/HCC)    • Chronic kidney disease    • COPD (chronic obstructive pulmonary disease) (CMS/HCC)    • Degeneration of cervical intervertebral disc    • Depression    • Diverticulosis    • Dyspnea    • Esophageal reflux    • Esophagitis    • Gastritis    • Hematuria    • Hemorrhoids    • Hiatal hernia    • History of arthritis    • History of nuclear stress test     UNSURE OF DATE   • History of peripheral neuropathy    • History of ventricular septal defect    • History of ventricular septal defect    • Hyperlipidemia    • Hypertension    • Impaired functional mobility, balance, gait, and endurance    • Infectious diarrhea    • Kyphosis, acquired    • Lumbar radiculopathy    • Mitral and aortic valve disease    • Nephrolithiasis    • Phimosis    • Problems with swallowing     WITH FOOD   • Respiratory failure (CMS/HCC)    • Sleep apnea     BIPAP   • TIA (transient ischemic attack)     X3.   2014   • Ventral hernia    • Wears glasses     FOR READING     Past Surgical History:   Procedure Laterality Date   • CARDIAC CATHETERIZATION     • CARDIAC CATHETERIZATION N/A 5/24/2018    Procedure: Left Heart Cath;  Surgeon: Edouard Robertson MD;  Location:  [facility];  Service: Cardiovascular   • CARDIAC ELECTROPHYSIOLOGY PROCEDURE N/A 1/31/2019    Procedure: PACEMAKER IMPLANTATION- DC;  Surgeon: Omar Encinas DO;  Location:  [facility];  Service: Cardiology   • COLONOSCOPY N/A 12/28/2019    Procedure: COLONOSCOPY WITH HOT POLYPECTOMY;  Surgeon: Celio Maldonado MD;  Location: [facility] ENDOSCOPY;  Service: Gastroenterology   • ENDOSCOPY N/A 1/26/2019    Procedure: ESOPHAGOGASTRODUODENOSCOPY;  Surgeon: Brunner, Mark I, MD;  Location:  [facility] ENDOSCOPY;  Service: Gastroenterology   • ENDOSCOPY N/A 12/27/2019    Procedure: ESOPHAGOGASTRODUODENOSCOPY;  Surgeon: Valdemar Mcdonald MD;  Location: [facility] ENDOSCOPY;  Service: General   • HERNIA REPAIR     • ORTHOPEDIC  SURGERY Left     Left hip arthroplasty   • PACEMAKER IMPLANTATION  01/31/2019   • SUPRAPUBIC CATHETER INSERTION      History of Percutaneous Catheter Placement Into Ureter   • THROAT SURGERY      FOR SLEEP APNEA   • VSD REPAIR      History of VSD Repair By Patch Closure     Family History   Problem Relation Age of Onset   • Other Father         CABG   • Coronary artery disease Father    • Colon cancer Neg Hx      Social History     Socioeconomic History   • Marital status:      Spouse name: Not on file   • Number of children: Not on file   • Years of education: Not on file   • Highest education level: Not on file   Occupational History     Employer: RETIRED   Tobacco Use   • Smoking status: Former Smoker     Packs/day: 3.00     Years: 30.00     Pack years: 90.00     Types: Cigarettes     Quit date: 2017     Years since quitting: 3.7   • Smokeless tobacco: Never Used   Substance and Sexual Activity   • Alcohol use: No   • Drug use: No   • Sexual activity: Defer       Current Outpatient Medications:   •  alfuzosin (UROXATRAL) 10 MG 24 hr tablet, @@ TAKE ONE TABLET BY MOUTH DAILY, Disp: 28 tablet, Rfl: 5  •  amLODIPine (NORVASC) 5 MG tablet, Take 1 tablet by mouth Daily., Disp: 30 tablet, Rfl: 2  •  ASPIRIN LOW DOSE 81 MG chewable tablet, @@ TAKE ONE TABLET BY MOUTH DAILY, Disp: 28 tablet, Rfl: 5  •  atorvastatin (LIPITOR) 10 MG tablet, Take 1 tablet by mouth Every Evening., Disp: 90 tablet, Rfl: 3  •  clopidogrel (PLAVIX) 75 MG tablet, TAKE ONE TABLET BY MOUTH DAILY, Disp: 90 tablet, Rfl: 3  •  ferrous sulfate 325 (65 Fe) MG tablet, TAKE ONE TABLET BY MOUTH DAILY, Disp: 28 tablet, Rfl: 5  •  furosemide (LASIX) 20 MG tablet, TAKE ONE TABLET BY MOUTH DAILY, Disp: 30 tablet, Rfl: 1  •  ipratropium (Atrovent) 0.06 % nasal spray, 2 sprays into the nostril(s) as directed by provider 4 (Four) Times a Day., Disp: 15 mL, Rfl: 1  •  ipratropium-albuterol (DUO-NEB) 0.5-2.5 mg/3 ml nebulizer, Take 3 mL by nebulization  Every 4 (Four) Hours As Needed for Wheezing., Disp: 360 mL, Rfl: 11  •  lidocaine (LIDODERM) 5 %, PLACE ONE PATCH ON SKIN AS DIRECTED,REMOVE & DISCARD PATCH WITHIN 12 HOURS, Disp: 30 patch, Rfl: 3  •  O2 (OXYGEN), Inhale 4 L/min 1 (One) Time. Walking on 4L and sitting 3L, Disp: , Rfl:   •  oxybutynin (DITROPAN) 5 MG tablet, OAKE 1 TABLET BY MOUTH DAILY AT BEDTIME, Disp: 90 tablet, Rfl: 3  •  pantoprazole (PROTONIX) 40 MG EC tablet, TAKE ONE TABLET BY MOUTH DAILY, Disp: 28 tablet, Rfl: 5  •  predniSONE (DELTASONE) 10 MG tablet, Take 1 tablet by mouth Daily., Disp: 30 tablet, Rfl: 1  •  traZODone (DESYREL) 50 MG tablet, Take 50 mg by mouth Every Night., Disp: , Rfl:   •  umeclidinium-vilanterol (Anoro Ellipta) 62.5-25 MCG/INH aerosol powder  inhaler, Inhale 1 puff Daily., Disp: 60 each, Rfl: 11     Allergies   Allergen Reactions   • Levaquin [Levofloxacin] Hives   • Sulfa Antibiotics Nausea And Vomiting     Review of Systems   Constitutional: Negative for appetite change and unexpected weight loss.   HENT: Positive for trouble swallowing.    Eyes: Negative for blurred vision.   Respiratory: Negative for choking and chest tightness.    Cardiovascular: Negative for leg swelling.   Gastrointestinal: Positive for GERD. Negative for abdominal distention, abdominal pain, anal bleeding, blood in stool, constipation, diarrhea, nausea, rectal pain, vomiting and indigestion.   Endocrine: Negative for polyphagia.   Genitourinary: Negative for hematuria.   Musculoskeletal: Negative for arthralgias and myalgias.   Skin: Negative for rash.   Allergic/Immunologic: Negative for food allergies.   Neurological: Negative for dizziness, syncope and confusion.   Hematological: Does not bruise/bleed easily.   Psychiatric/Behavioral: Negative for depressed mood.      The following portions of the patient's history were reviewed and updated as appropriate: allergies, current medications, past family history, past medical history, past social  history, past surgical history and problem list.  Objective     Physical Exam  Vitals signs and nursing note reviewed.   Constitutional:       General: He is awake. He is not in acute distress.     Appearance: Normal appearance. He is well-developed. He is ill-appearing. He is not diaphoretic.   HENT:      Head: Normocephalic and atraumatic.      Right Ear: Hearing and external ear normal.      Left Ear: Hearing and external ear normal.      Nose: Nose normal.      Mouth/Throat:      Mouth: Mucous membranes are not pale, not dry and not cyanotic. No oral lesions.      Pharynx: No oropharyngeal exudate.   Eyes:      General: Lids are normal.         Right eye: No discharge.         Left eye: No discharge.      Conjunctiva/sclera: Conjunctivae normal.   Neck:      Musculoskeletal: Neck supple. No edema.      Thyroid: No thyroid mass or thyromegaly.      Vascular: No JVD.      Trachea: Trachea normal.   Cardiovascular:      Rate and Rhythm: Normal rate and regular rhythm.      Heart sounds: Normal heart sounds and S2 normal. No murmur. No friction rub. No gallop. No S3 sounds.    Pulmonary:      Effort: Pulmonary effort is normal. Tachypnea (patient is on oxygen per NC) present. No respiratory distress.      Breath sounds: Normal breath sounds.   Chest:      Chest wall: No tenderness.   Abdominal:      General: Bowel sounds are normal. There is no distension.      Palpations: Abdomen is not rigid. There is no hepatomegaly, splenomegaly or mass.      Tenderness: There is no abdominal tenderness. There is no guarding or rebound.      Hernia: No hernia is present.   Lymphadenopathy:      Cervical: No cervical adenopathy.      Upper Body:      Left upper body: No supraclavicular adenopathy.   Skin:     General: Skin is warm and dry.      Coloration: Skin is not pale.      Findings: No rash.      Nails: There is no clubbing.     Neurological:      General: No focal deficit present.      Mental Status: He is alert and  "oriented to person, place, and time.      Cranial Nerves: No cranial nerve deficit.      Sensory: No sensory deficit.      Gait: Gait abnormal (uses walker).   Psychiatric:         Mood and Affect: Mood normal.         Speech: Speech normal.         Behavior: Behavior normal. Behavior is cooperative.       Vitals:    09/17/20 1016   BP: 114/59   Pulse: 80   Resp: 18   Temp: 97.3 °F (36.3 °C)   Weight: 76.2 kg (168 lb)   Height: 170.2 cm (67\")     Results Review:   I reviewed the patient's new clinical results.    Hospital Outpatient Visit on 09/09/2020   Component Date Value Ref Range Status   • WBC 09/09/2020 7.51  3.40 - 10.80 10*3/mm3 Final   • RBC 09/09/2020 4.07* 4.14 - 5.80 10*6/mm3 Final   • Hemoglobin 09/09/2020 12.3* 13.0 - 17.7 g/dL Final   • Hematocrit 09/09/2020 39.5  37.5 - 51.0 % Final   • MCV 09/09/2020 97.1* 79.0 - 97.0 fL Final   • MCH 09/09/2020 30.2  26.6 - 33.0 pg Final   • MCHC 09/09/2020 31.1* 31.5 - 35.7 g/dL Final   • RDW 09/09/2020 12.4  12.3 - 15.4 % Final   • RDW-SD 09/09/2020 44.3  37.0 - 54.0 fl Final   • MPV 09/09/2020 8.7  6.0 - 12.0 fL Final   • Platelets 09/09/2020 229  140 - 450 10*3/mm3 Final   Telemedicine on 08/21/2020   Component Date Value Ref Range Status   • Glucose 08/24/2020 120* 65 - 99 mg/dL Final   • BUN 08/24/2020 37* 8 - 27 mg/dL Final   • Creatinine 08/24/2020 2.21* 0.76 - 1.27 mg/dL Final   • eGFR Non African Am 08/24/2020 29* >59 mL/min/1.73 Final   • eGFR African Am 08/24/2020 34* >59 mL/min/1.73 Final   • BUN/Creatinine Ratio 08/24/2020 17  10 - 24 Final   • Sodium 08/24/2020 144  134 - 144 mmol/L Final   • Potassium 08/24/2020 4.8  3.5 - 5.2 mmol/L Final   • Chloride 08/24/2020 96  96 - 106 mmol/L Final   • Total CO2 08/24/2020 31* 20 - 29 mmol/L Final   • Calcium 08/24/2020 9.5  8.6 - 10.2 mg/dL Final   • Total Protein 08/24/2020 6.6  6.0 - 8.5 g/dL Final   • Albumin 08/24/2020 4.5  3.8 - 4.8 g/dL Final   • Globulin 08/24/2020 2.1  1.5 - 4.5 g/dL Final   • A/G " Ratio 08/24/2020 2.1  1.2 - 2.2 Final   • Total Bilirubin 08/24/2020 0.8  0.0 - 1.2 mg/dL Final   • Alkaline Phosphatase 08/24/2020 62  39 - 117 IU/L Final   • AST (SGOT) 08/24/2020 17  0 - 40 IU/L Final   • ALT (SGPT) 08/24/2020 19  0 - 44 IU/L Final   • TSH 08/24/2020 1.210  0.450 - 4.500 uIU/mL Final   • WBC 08/24/2020 7.8  3.4 - 10.8 x10E3/uL Final   • RBC 08/24/2020 4.25  4.14 - 5.80 x10E6/uL Final   • Hemoglobin 08/24/2020 12.4* 13.0 - 17.7 g/dL Final   • Hematocrit 08/24/2020 39.4  37.5 - 51.0 % Final   • MCV 08/24/2020 93  79 - 97 fL Final   • MCH 08/24/2020 29.2  26.6 - 33.0 pg Final   • MCHC 08/24/2020 31.5  31.5 - 35.7 g/dL Final   • RDW 08/24/2020 12.3  11.6 - 15.4 % Final   • Platelets 08/24/2020 231  150 - 450 x10E3/uL Final   • Neutrophil Rel % 08/24/2020 89  Not Estab. % Final   • Lymphocyte Rel % 08/24/2020 5  Not Estab. % Final   • Monocyte Rel % 08/24/2020 6  Not Estab. % Final   • Eosinophil Rel % 08/24/2020 0  Not Estab. % Final   • Basophil Rel % 08/24/2020 0  Not Estab. % Final   • Neutrophils Absolute 08/24/2020 6.9  1.4 - 7.0 x10E3/uL Final   • Lymphocytes Absolute 08/24/2020 0.4* 0.7 - 3.1 x10E3/uL Final   • Monocytes Absolute 08/24/2020 0.5  0.1 - 0.9 x10E3/uL Final   • Eosinophils Absolute 08/24/2020 0.0  0.0 - 0.4 x10E3/uL Final   • Basophils Absolute 08/24/2020 0.0  0.0 - 0.2 x10E3/uL Final   • Immature Granulocyte Rel % 08/24/2020 0  Not Estab. % Final   • Immature Grans Absolute 08/24/2020 0.0  0.0 - 0.1 x10E3/uL Final   Hospital Outpatient Visit on 08/12/2020   Component Date Value Ref Range Status   • WBC 08/12/2020 7.17  3.40 - 10.80 10*3/mm3 Final   • RBC 08/12/2020 3.65* 4.14 - 5.80 10*6/mm3 Final   • Hemoglobin 08/12/2020 11.4* 13.0 - 17.7 g/dL Final   • Hematocrit 08/12/2020 35.9* 37.5 - 51.0 % Final   • MCV 08/12/2020 98.4* 79.0 - 97.0 fL Final   • MCH 08/12/2020 31.2  26.6 - 33.0 pg Final   • MCHC 08/12/2020 31.8  31.5 - 35.7 g/dL Final   • RDW 08/12/2020 12.6  12.3 - 15.4 %  Final   • RDW-SD 08/12/2020 45.1  37.0 - 54.0 fl Final   • MPV 08/12/2020 8.6  6.0 - 12.0 fL Final   • Platelets 08/12/2020 222  140 - 450 10*3/mm3 Final   Hospital Outpatient Visit on 07/15/2020   Component Date Value Ref Range Status   • WBC 07/15/2020 7.18  3.40 - 10.80 10*3/mm3 Final   • RBC 07/15/2020 3.89* 4.14 - 5.80 10*6/mm3 Final   • Hemoglobin 07/15/2020 11.6* 13.0 - 17.7 g/dL Final   • Hematocrit 07/15/2020 37.4* 37.5 - 51.0 % Final   • MCV 07/15/2020 96.1  79.0 - 97.0 fL Final   • MCH 07/15/2020 29.8  26.6 - 33.0 pg Final   • MCHC 07/15/2020 31.0* 31.5 - 35.7 g/dL Final   • RDW 07/15/2020 13.4  12.3 - 15.4 % Final   • RDW-SD 07/15/2020 47.6  37.0 - 54.0 fl Final   • MPV 07/15/2020 8.5  6.0 - 12.0 fL Final   • Platelets 07/15/2020 184  140 - 450 10*3/mm3 Final      No radiology results for the last 90 days.     Dated January 3, 2013 the patient underwent a colonoscopy in [facility] by Dr. Posadas which revealed: Left-sided colonic diverticulosis.  Internal and external hemorrhoids.  Otherwise normal colonoscopy procedure.    EGD per Dr. Eddie Oneil dated 1/26/2019 with large amount of undigested food in the stomach.  No gastric outlet obstruction. No dilation, patient is on anticoagulants.    EGD per Dr. Valdemar Moe dated 12/27/2019 normal duodenum, normal stomach, normal esophagus.  No specimens.    Colonoscopy per Dr. Celio Maldonado dated 12/28/2019 with 1 7 mm polyp at the rectosigmoid colon.  Mild diverticulosis in sigmoid colon and descending colon.  Rectal polyp biopsy with mucosal tag with crush/cautery artifact.  No dysplasia or malignancy.    Assessment / Plan      1. Esophageal dysphagia  The patient has a history of difficulty swallowing for many years and has had multiple EGDs with dilation in the past. The patient had EGD in January 2019 by Dr. Oneil at [facility] and gastroparesis and presbyesophagus noted. It was recommended to have dilation in the future. The patient had  "EGD with dilation with significant improvement in [place] in October 2019, records not available. Last EGD being December 2019 by Dr. Moe that was \"normal\".  The patient has difficulty swallowing solids, no difficulty swallowing liquids. There is no history of weight loss. The patient is on PPI with reasonable control of GERD.   Differentials include presbyesophagus. Patient has had improvement with dilations in the past.  EGD to rule out esophageal stricture.  The patient may need esophageal manometry in the future if no improvement to rule out presbyesophagus vs. Achalasia.    - Case Request; Standing  - Implement Anesthesia Orders Day of Procedure; Standing  - Obtain Informed Consent; Standing  - Oxygen Therapy- Nasal Cannula; 2 LPM; Titrate for SPO2: equal to or greater than, 90%; Standing  - sodium chloride 0.9 % infusion  - Case Request    2. Gastroesophageal reflux disease, esophagitis presence not specified  Reasonable control with PPI. Will continue for now.     3. Anemia, unspecified type  Mild anemia per chart that has gradually improved. The patient denies overt GI bleed, no hematemesis, hematochezia or melena.   EGD for evaluation.    Patient Instructions   1. Antireflux measures: Avoid fried, fatty foods, alcohol, chocolate, coffee, tea,  soft drinks, peppermint and spearmint, spicy foods, tomatoes and tomato based foods, onion based foods, and smoking. Other antireflux measures include weight reduction if overweight, avoiding tight clothing around the abdomen, elevating the head of the bed 6 inches with blocks under the head board, and don't drink or eat before going to bed and avoid lying down immediately after meals.  2. Pantoprazole 40 mg 1 by mouth in the am 30 minutes before breakfast.  3. Dietary instructions.  The patient should eat relatively soft diet, and should eat in upright position and chew well.  The patient should drink water after 2-3 bites and take medications with liberal " amounts of water.  Generally medications that have a potential to cause pill esophagitis may be avoided or used in an alternative form (for example if the patient needs potassium it may be used in a liquid rather than pill form).  Furthermore after eating and taking medications, the patient should remain in upright position for 5-10 minutes.  4. Upper endoscopy-EGD: Description of the procedure, risks, benefits, alternatives and options, including nonoperative options, were discussed with the patient in detail. The patient understands and wishes to proceed.  5. COVID-19 testing prior to procedure. The patient will need to self-quarantine after testing until the procedure. Instructions given to patient.    Janki Lozano, APRN  9/17/2020    Please note that portions of this note may have been completed with a voice recognition program. Efforts were made to edit the dictations, but occasionally words are mistranscribed.

## 2020-09-18 PROBLEM — R13.19 ESOPHAGEAL DYSPHAGIA: Status: ACTIVE | Noted: 2020-01-01

## 2020-09-24 NOTE — TELEPHONE ENCOUNTER
Negar with [facility] called and would like to know if it is possible to put in a urine culture for possible UTI. She said that something is off about him and he was confused this morning about where an appointment was which is not like him at all. He is refusing to do the urine at [facility] because there is a fee that he has to pay. Please advise and contact Negar when ordered/if possible.    Ph # [phone]

## 2020-09-24 NOTE — TELEPHONE ENCOUNTER
Called Negar back with [facility]; Negar states that this morning Jani has been very confused and did not know where his appointment with his Nephrologist was. Negar stated he was not acting like himself and not in his normal mental state.. Negar was wanting to see if we could order a urine culture but she stated since he was going to his Nephrologist then they would probably do a urine on him then. Negar stated that she would let me know if they did a urine on him and keep us posted.

## 2020-09-24 NOTE — TELEPHONE ENCOUNTER
Caller: LAMONT    Relationship: [facility]    Best call back number: [phone]       What orders are you requesting (i.e. lab or imaging): SPEECH THERAPY      Where will you receive your lab/imaging services: [facility]     Additional notes: NEEDS SPEECH THERAPY ORDERS DUE TO A CHOKING INCIDENT

## 2020-09-25 NOTE — ED NOTES
Patient's ride Negar, called at this time and notified of pending discharge.      Carmen Dumont RN  09/25/20 [id number]

## 2020-09-25 NOTE — ED PROVIDER NOTES
"Subjective   70-year-old male presenting with \"feeling out of sync\".  He states that for the last few days he has felt out of sync, cannot further describe the symptoms.  Yesterday had an episode of shortness of breath while at the nephrologist office but notes that his oxygen tank was out at that time.  He states he is felt like this before when he had a urinary tract infection.  He is also concerned that he has had a TIA in the past and this could be another TIA.  He denies any fevers, chills, cough, chest pain, abdominal pain, nausea, vomiting.  Apparently his primary doctor was worried about his CO2 and possible COPD exacerbation.          Review of Systems   Constitutional: Positive for fatigue.   HENT: Negative.    Eyes: Negative.    Respiratory: Positive for shortness of breath.    Cardiovascular: Negative.    Gastrointestinal: Negative.    Genitourinary: Negative.    Musculoskeletal: Negative.    Skin: Negative.    Neurological: Negative.    Psychiatric/Behavioral: Positive for confusion.       Past Medical History:   Diagnosis Date   • Abnormal liver enzymes    • Anemia    • Anxiety    • Arthritis    • Atherosclerotic heart disease of native coronary artery without angina pectoris    • Atrial fibrillation (CMS/HCC)    • Back pain    • BPH (benign prostatic hyperplasia)    • Cataract, bilateral    • Chest pain     2-3 MONTHS AGO.  PER PT, HAS ADDRESSED WITH CARDIOLOGIST.  STATES HAS NTG PRN.   • CHF (congestive heart failure) (CMS/HCC)    • Chronic bronchitis (CMS/HCC)    • Chronic kidney disease    • COPD (chronic obstructive pulmonary disease) (CMS/HCC)    • Degeneration of cervical intervertebral disc    • Depression    • Diverticulosis    • Dyspnea    • Esophageal reflux    • Esophagitis    • Gastritis    • Hematuria    • Hemorrhoids    • Hiatal hernia    • History of arthritis    • History of nuclear stress test     UNSURE OF DATE   • History of peripheral neuropathy    • History of ventricular septal " defect    • History of ventricular septal defect    • Hyperlipidemia    • Hypertension    • Impaired functional mobility, balance, gait, and endurance    • Infectious diarrhea    • Kyphosis, acquired    • Lumbar radiculopathy    • Mitral and aortic valve disease    • Nephrolithiasis    • Phimosis    • Problems with swallowing     WITH FOOD   • Respiratory failure (CMS/HCC)    • Sleep apnea     BIPAP   • TIA (transient ischemic attack)     X3.   2014   • Ventral hernia    • Wears glasses     FOR READING       Allergies   Allergen Reactions   • Levaquin [Levofloxacin] Hives   • Sulfa Antibiotics Nausea And Vomiting       Past Surgical History:   Procedure Laterality Date   • CARDIAC CATHETERIZATION     • CARDIAC CATHETERIZATION N/A 5/24/2018    Procedure: Left Heart Cath;  Surgeon: Edouard Robertson MD;  Location:  [facility];  Service: Cardiovascular   • CARDIAC ELECTROPHYSIOLOGY PROCEDURE N/A 1/31/2019    Procedure: PACEMAKER IMPLANTATION- DC;  Surgeon: Omar Encinas DO;  Location:  [facility];  Service: Cardiology   • COLONOSCOPY N/A 12/28/2019    Procedure: COLONOSCOPY WITH HOT POLYPECTOMY;  Surgeon: Celio Maldonado MD;  [facility]:  [facility];  Service: Gastroenterology   • ENDOSCOPY N/A 1/26/2019    Procedure: ESOPHAGOGASTRODUODENOSCOPY;  Surgeon: Brunner, Mark I, MD;  Location:  [facility];  Service: Gastroenterology   • ENDOSCOPY N/A 12/27/2019    Procedure: ESOPHAGOGASTRODUODENOSCOPY;  Surgeon: Valdemar Mcdonald MD;  Location: [facility] END[facility]COPY;  Service: General   • HERNIA REPAIR     • ORTHOPEDIC SURGERY Left     Left hip arthroplasty   • PACEMAKER IMPLANTATION  01/31/2019   • SUPRAPUBIC CATHETER INSERTION      History of Percutaneous Catheter Placement Into Ureter   • THROAT SURGERY      FOR SLEEP APNEA   • VSD REPAIR      History of VSD Repair By Patch Closure       Family History   Problem Relation Age of Onset   • Other Father         CABG   • Coronary artery  disease Father    • Colon cancer Neg Hx        Social History     Socioeconomic History   • Marital status:      Spouse name: Not on file   • Number of children: Not on file   • Years of education: Not on file   • Highest education level: Not on file   Occupational History     Employer: RETIRED   Tobacco Use   • Smoking status: Former Smoker     Packs/day: 3.00     Years: 30.00     Pack years: 90.00     Types: Cigarettes     Quit date: 2017     Years since quitting: 3.7   • Smokeless tobacco: Never Used   Substance and Sexual Activity   • Alcohol use: No   • Drug use: No   • Sexual activity: Defer           Objective   Physical Exam  Vitals signs reviewed.   Constitutional:       General: He is not in acute distress.     Appearance: Normal appearance. He is not ill-appearing, toxic-appearing or diaphoretic.   HENT:      Head: Normocephalic and atraumatic.      Right Ear: External ear normal.      Left Ear: External ear normal.      Nose: Nose normal.      Mouth/Throat:      Mouth: Mucous membranes are moist.      Pharynx: Oropharynx is clear.   Eyes:      Extraocular Movements: Extraocular movements intact.      Conjunctiva/sclera: Conjunctivae normal.      Pupils: Pupils are equal, round, and reactive to light.   Neck:      Musculoskeletal: Normal range of motion and neck supple.   Cardiovascular:      Rate and Rhythm: Normal rate and regular rhythm.      Pulses: Normal pulses.      Heart sounds: Normal heart sounds.   Pulmonary:      Effort: Pulmonary effort is normal. No respiratory distress.      Breath sounds: Normal breath sounds. No stridor. No wheezing, rhonchi or rales.   Abdominal:      General: Bowel sounds are normal. There is no distension.      Tenderness: There is no abdominal tenderness.   Musculoskeletal: Normal range of motion.         General: No swelling, tenderness or deformity.   Skin:     General: Skin is warm and dry.      Capillary Refill: Capillary refill takes less than 2 seconds.       Findings: No rash.   Neurological:      General: No focal deficit present.      Mental Status: He is alert and oriented to person, place, and time.      Comments: Cranial nerves intact, normal strength and sensation   Psychiatric:         Mood and Affect: Mood normal.         Behavior: Behavior normal.         Procedures           ED Course                                           MDM  Number of Diagnoses or Management Options  Episodic confusion:   Diagnosis management comments: 70-year-old male with altered mental status.  Well-developed, well-nourished elderly man in no distress with exam as above.  He is alert and oriented here.  His lungs are clear.  Will obtain labs, EKG, head CT and chest x-ray.  We will continue to monitor.  Disposition pending.    DDX: UTI, pneumonia, hypercapnia, TIA, electrolyte abnormality    EKG interpreted by me: Paced rhythm, no further interpretation, this is an abnormal EKG    Work-up is without acute or significant abnormality.  Initial troponin was mildly elevated but this trended down on recheck.  This also is similar to previous values.  Imaging per radiology reveals chronic findings but no acute abnormality.  He is remained stable throughout his stay here in the ED.  He is ready to go home.  I do feel he is safe for discharge home.  Encourage close outpatient follow-up.  He is happy with this plan.       Amount and/or Complexity of Data Reviewed  Clinical lab tests: reviewed  Tests in the radiology section of CPT®: reviewed  Decide to obtain previous medical records or to obtain history from someone other than the patient: yes        Final diagnoses:   Episodic confusion            Edouard Durán MD  09/25/20 5014

## 2020-09-25 NOTE — TELEPHONE ENCOUNTER
Negar at [facility] called about Jani - he was abnormally confused - abnormal urine from Dr. Owens    Per Dr. Rojas patient is to go to the ER     Negar jensen [facility] has been informed

## 2020-09-30 NOTE — PROGRESS NOTES
[facility]  Office Progress Note  Jani Pena  1950  [address] [address]       Visit Date: 09/30/20    PCP: Miguel Rojas MD  [address]  [place] [facility] [address]    IDENTIFICATION: A 70 y.o. male  resident of [place], [place].    PROBLEM LIST:   1. CAD  1. 1994, 2008- [facility] nonobstr  2. 2011 SE wnl  3. 5/18 nonobst cad   2. Abnl Echo  1. 2013 EF 45 w BBB  2. 1/3/18 echo: EF 45-50%, moderate LA enlargement, mitral thickening with mild MR, sclerosed aortic valve with mild AI, mild TR, apical segmental hypokinesis  3. CVD  1. B CEA -[facility] remote  2. C[facility] 2014 nonobst  4. HTN  5. Remote renal artery stent  6. HL  1. 6/14 137/149/42/65  7. SSS  1. 1/31/19 [facility] Huang  8. CKD 3-2.4 2020  9. PEDRO  10. COPD  11. PFO closure Dr Alves       Chief Complaint   Patient presents with   • Coronary Artery Disease       Allergies  Allergies   Allergen Reactions   • Levaquin [Levofloxacin] Hives   • Sulfa Antibiotics Nausea And Vomiting       Current Medications    Current Outpatient Medications:   •  acetaminophen (TYLENOL) 500 MG tablet, Take 1 tablet by mouth Every 6 (Six) Hours As Needed for Mild Pain ., Disp: 45 tablet, Rfl: 1  •  alfuzosin (UROXATRAL) 10 MG 24 hr tablet, @@ TAKE ONE TABLET BY MOUTH DAILY, Disp: 28 tablet, Rfl: 5  •  amLODIPine (NORVASC) 5 MG tablet, Take 1 tablet by mouth Daily., Disp: 30 tablet, Rfl: 2  •  ASPIRIN LOW DOSE 81 MG chewable tablet, @@ TAKE ONE TABLET BY MOUTH DAILY, Disp: 28 tablet, Rfl: 5  •  atorvastatin (LIPITOR) 10 MG tablet, Take 1 tablet by mouth Every Evening., Disp: 90 tablet, Rfl: 3  •  benzonatate (TESSALON) 200 MG capsule, Take 1 capsule by mouth 3 (Three) Times a Day As Needed for Cough., Disp: 45 capsule, Rfl: 0  •  clopidogrel (PLAVIX) 75 MG tablet, TAKE ONE TABLET BY MOUTH DAILY, Disp: 90 tablet, Rfl: 3  •  ferrous sulfate 325 (65 Fe) MG tablet, TAKE ONE TABLET BY MOUTH DAILY, Disp: 28 tablet,  "Rfl: 5  •  furosemide (LASIX) 20 MG tablet, TAKE ONE TABLET BY MOUTH DAILY, Disp: 30 tablet, Rfl: 1  •  ipratropium (Atrovent) 0.06 % nasal spray, 2 sprays into the nostril(s) as directed by provider 4 (Four) Times a Day., Disp: 15 mL, Rfl: 1  •  ipratropium-albuterol (DUO-NEB) 0.5-2.5 mg/3 ml nebulizer, Take 3 mL by nebulization Every 4 (Four) Hours As Needed for Wheezing., Disp: 360 mL, Rfl: 11  •  lidocaine (LIDODERM) 5 %, PLACE ONE PATCH ON SKIN AS DIRECTED,REMOVE & DISCARD PATCH WITHIN 12 HOURS, Disp: 30 patch, Rfl: 3  •  O2 (OXYGEN), Inhale 4 L/min 1 (One) Time. Walking on 4L and sitting 3L, Disp: , Rfl:   •  oxybutynin (DITROPAN) 5 MG tablet, OAKE 1 TABLET BY MOUTH DAILY AT BEDTIME, Disp: 90 tablet, Rfl: 3  •  pantoprazole (PROTONIX) 40 MG EC tablet, TAKE ONE TABLET BY MOUTH DAILY, Disp: 28 tablet, Rfl: 5  •  predniSONE (DELTASONE) 10 MG tablet, Take 1 tablet by mouth Daily., Disp: 30 tablet, Rfl: 1  •  traZODone (DESYREL) 50 MG tablet, Take 50 mg by mouth Every Night., Disp: , Rfl:   •  umeclidinium-vilanterol (Anoro Ellipta) 62.5-25 MCG/INH aerosol powder  inhaler, Inhale 1 puff Daily., Disp: 60 each, Rfl: 11      History of Present Illness   Jani Pena is a 70 y.o. year old male here for follow up.  Baseline end-stage COPD he is remained out of the hospital since October of this past year he remains in assisted living  States he feels better than he has in some time however  .      OBJECTIVE:  Vitals:    09/30/20 1534   BP: 116/66   BP Location: Right arm   Patient Position: Sitting   Pulse: 70   SpO2: 90%   Weight: 74.4 kg (164 lb)   Height: 171.5 cm (67.5\")     Physical Exam   Constitutional: He appears well-developed and well-nourished.   Chronic O2   Neck: Normal range of motion. Neck supple. No hepatojugular reflux and no JVD present. Carotid bruit is not present. No tracheal deviation present. No thyromegaly present.   Cardiovascular: Normal rate, regular rhythm, S1 normal, S2 normal, intact " distal pulses and normal pulses. PMI is not displaced. Exam reveals no gallop, no distant heart sounds, no friction rub, no midsystolic click and no opening snap.   No murmur heard.  Pulses:       Radial pulses are 2+ on the right side and 2+ on the left side.        Dorsalis pedis pulses are 2+ on the right side and 2+ on the left side.        Posterior tibial pulses are 2+ on the right side and 2+ on the left side.   Pulmonary/Chest: Effort normal and breath sounds normal. He has no wheezes. He has no rales.   Prolonged expiratory phase with diminished breath sounds   Abdominal: Soft. Bowel sounds are normal. He exhibits no mass. There is no abdominal tenderness. There is no guarding.       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 Coronary artery disease involving native coronary artery of native heart without angina pectoris     2. AV block, Mobitz II     3. Essential hypertension     4. Mixed hyperlipidemia         PLAN:  CAD historically nonobstructive continue medical management    AV block post pacemaker acceptable function continue monitoring    Hypertension acceptable control lisinopril    Dyslipidemia on statin therapy    End-stage COPD oxygen dependent counseled regarding avoidance of infectious environments    Miguel Rojas MD, thank you for referring Mr. Pena for evaluation.  I have forwarded my electronically generated recommendations to you for review.  Please do not hesitate to call with any questions.      Ulysses Bass MD, [facility]

## 2020-10-01 PROBLEM — R82.90 ABNORMAL URINALYSIS: Status: ACTIVE | Noted: 2020-01-01

## 2020-10-12 PROBLEM — R13.19 ESOPHAGEAL DYSPHAGIA: Status: RESOLVED | Noted: 2020-01-01 | Resolved: 2020-01-01

## 2020-10-12 NOTE — PROGRESS NOTES
Subjective   Jani Pena is a 70 y.o. male.     Chief Complaint   Patient presents with   • COPD     Pt states his breathing is about the same. He is currently on 4LNC when walking and 3LNC when at rest.    • Hypertension     He lives at the [facility] and the nursing staff checks his BP about 1-2 times daily.        History of Present Illness   Patient here for follow-up.  Dysphagia patient is going to have a upper endoscopy.  Atrial fibrillation stable now.  Edema resolved.  Anemia resolved.  CKD stable now.  Hyperlipidemia stable on medication use CAD stable without chest pain.  COPD stable on medication.  Sleep apnea stable on CPAP machine.  GERD stable on medication.  Hypertension stable on medication.  Low back pain improved on medication.  UTI resolved after medication.  Uric acid is still high but patient denies any joints pain now.    Current Outpatient Medications:   •  acetaminophen (TYLENOL) 500 MG tablet, Take 1 tablet by mouth Every 6 (Six) Hours As Needed for Mild Pain ., Disp: 45 tablet, Rfl: 1  •  alfuzosin (UROXATRAL) 10 MG 24 hr tablet, @@ TAKE ONE TABLET BY MOUTH DAILY, Disp: 28 tablet, Rfl: 5  •  amLODIPine (NORVASC) 5 MG tablet, TAKE ONE TABLET BY MOUTH DAILY, Disp: 90 tablet, Rfl: 3  •  ASPIRIN LOW DOSE 81 MG chewable tablet, @@ TAKE ONE TABLET BY MOUTH DAILY, Disp: 28 tablet, Rfl: 5  •  atorvastatin (LIPITOR) 10 MG tablet, Take 1 tablet by mouth Every Evening., Disp: 90 tablet, Rfl: 3  •  benzonatate (TESSALON) 200 MG capsule, Take 1 capsule by mouth 3 (Three) Times a Day As Needed for Cough., Disp: 45 capsule, Rfl: 0  •  cephalexin (Keflex) 500 MG capsule, Take 1 capsule by mouth 3 (Three) Times a Day., Disp: 21 capsule, Rfl: 0  •  clopidogrel (PLAVIX) 75 MG tablet, TAKE ONE TABLET BY MOUTH DAILY, Disp: 90 tablet, Rfl: 3  •  ferrous sulfate 325 (65 Fe) MG tablet, TAKE ONE TABLET BY MOUTH DAILY, Disp: 28 tablet, Rfl: 5  •  furosemide (LASIX) 20 MG tablet, TAKE ONE TABLET BY MOUTH DAILY,  Disp: 30 tablet, Rfl: 1  •  ipratropium (Atrovent) 0.06 % nasal spray, 2 sprays into the nostril(s) as directed by provider 4 (Four) Times a Day., Disp: 15 mL, Rfl: 1  •  ipratropium-albuterol (DUO-NEB) 0.5-2.5 mg/3 ml nebulizer, Take 3 mL by nebulization Every 4 (Four) Hours As Needed for Wheezing., Disp: 360 mL, Rfl: 11  •  lidocaine (LIDODERM) 5 %, PLACE ONE PATCH ON SKIN AS DIRECTED,REMOVE & DISCARD PATCH WITHIN 12 HOURS, Disp: 30 patch, Rfl: 3  •  O2 (OXYGEN), Inhale 4 L/min 1 (One) Time. Walking on 4L and sitting 3L, Disp: , Rfl:   •  oxybutynin (DITROPAN) 5 MG tablet, OAKE 1 TABLET BY MOUTH DAILY AT BEDTIME, Disp: 90 tablet, Rfl: 3  •  pantoprazole (PROTONIX) 40 MG EC tablet, TAKE ONE TABLET BY MOUTH DAILY, Disp: 28 tablet, Rfl: 5  •  predniSONE (DELTASONE) 10 MG tablet, Take 1 tablet by mouth Daily., Disp: 30 tablet, Rfl: 1  •  traZODone (DESYREL) 50 MG tablet, Take 50 mg by mouth Every Night., Disp: , Rfl:   •  umeclidinium-vilanterol (Anoro Ellipta) 62.5-25 MCG/INH aerosol powder  inhaler, Inhale 1 puff Daily., Disp: 60 each, Rfl: 11    The following portions of the patient's history were reviewed and updated as appropriate: allergies, current medications, past family history, past medical history, past social history, past surgical history and problem list.    Review of Systems   Constitutional: Negative.    Respiratory: Positive for shortness of breath ( baseline).    Cardiovascular: Negative.    Gastrointestinal: Negative.    Musculoskeletal: Negative.    Skin: Negative.    Neurological: Negative.    Psychiatric/Behavioral: Negative.        Objective   Physical Exam  HENT:      Head: Normocephalic and atraumatic.   Neck:      Musculoskeletal: Neck supple.   Cardiovascular:      Rate and Rhythm: Normal rate and regular rhythm.      Heart sounds: Normal heart sounds.   Pulmonary:      Comments: Mild soa baseline   Abdominal:      General: Bowel sounds are normal.   Skin:     General: Skin is warm.    Neurological:      Mental Status: He is alert and oriented to person, place, and time.         All tests have been reviewed.    Assessment/Plan   Diagnoses and all orders for this visit:                Paroxysmal atrial fibrillation (CMS/HCC) continue medication     Edema resolved after lasix follow-up with kidney function again next     Dysphagia ref to GI pending EGD     Mixed hyperlipidemia continue medication     Coronary artery disease involving native coronary artery of native heart with angina pectoris (CMS/HCC) continue medication     Steroid-dependent COPD (CMS/[facility])  cont prednisone     PEDRO on CPAP continue     GERD without esophagitis continue medication     Chronic kidney disease, unspecified CKD stage follow-up with nephrologist is stable now     Anemia, prob due to CKD     Essential hypertension  continue medication     Low back pain continue Lidoderm improved    UTI resolved after medicine    Uric Acid high still without joints pain, good diet for now        3 months follow-up               Below is to historical records for reference only: Chronic diastolic congestive heart failure (CMS/HCC) stable now  Coronary artery disease cath negative  SSS, s/p Cardiac pacemaker in situ follow cardio  Steroid-dependent COPD (CMS/[facility]),  continue present mx recent exacerbation, improved after medication.   Esophageal dysphagia, s/p dilation, still dysphagia. GERD without esophagitis, UGI NSD,  follow up with GI,   Lumbar radiculopathy stable   Chronic kidney disease, stage III (moderate) (CMS/[facility]) watch   Left neck pain continue baclofen tylenol and PT  osteopenia, oscal D 500mg bid continue  left low back pain resolved follow up with ortho s/p left hip surgery for severe OA doing well.   divertic hemorroid on colon 1/2013  DM stable without med  phimosis seen by uro declined surgery  Hematuria followup with urologist s/p cystoscope bladder stones  Hypertension continue med  BPH enlarged on CT scan follow up  with urologist.  Anemia workup all negative.? anemia of chronic disease due to chronic disease   History of the CAD atrial fibrillation VSD status post occlusion stable now. Status post a defibrillator seen by cardio follow up with cardiojim chest pain follow cardio  History of a TIA patient is on Plavix continue  pneumovax done, prevnar 13 1/19/17, Td to HD.flushot done, shingrix informed  Depression anxiety cont lexapro to 20 daily and cont xanax prn  Decline to disrobe for PE  right inquinal hernia decline surgery now, call if worse  BLE edema mild, watch for now, cut down salt, avoid soda  Gall stone without sx  Rt flank pain do UA and US  CKD follow up with renal

## 2020-10-12 NOTE — TELEPHONE ENCOUNTER
Patient states that she brought forms for  to sign and needs to be faxed today.  He can be reached at [phone]

## 2020-10-12 NOTE — TELEPHONE ENCOUNTER
Did you ask Dr. Rojas about these this morning? I know Beck had them in her hands right before lunch.

## 2020-10-15 PROBLEM — R13.19 ESOPHAGEAL DYSPHAGIA: Status: ACTIVE | Noted: 2020-01-01

## 2020-10-20 NOTE — H&P
[facility]  HISTORY AND PHYSICAL    Patient Name: Jani Pena  : 1950  MRN: [id number]    Chief Complaint:   For EGD dilatation    History Of Presenting Illness:    Esophageal dysphagia    Past Medical History:   Diagnosis Date   • Abnormal liver enzymes    • Anemia    • Anxiety    • Arthritis    • Atherosclerotic heart disease of native coronary artery without angina pectoris    • Atrial fibrillation (CMS/HCC)    • Back pain    • BPH (benign prostatic hyperplasia)    • Cataract, bilateral    • Chest pain     2-3 MONTHS AGO.  PER PT, HAS ADDRESSED WITH CARDIOLOGIST.  STATES HAS NTG PRN.   • CHF (congestive heart failure) (CMS/HCC)    • Chronic bronchitis (CMS/HCC)    • Chronic kidney disease    • COPD (chronic obstructive pulmonary disease) (CMS/HCC)    • Degeneration of cervical intervertebral disc    • Depression    • Diverticulosis    • Dyspnea    • Esophageal reflux    • Esophagitis    • Gastritis    • Hematuria    • Hemorrhoids    • Hiatal hernia    • History of arthritis    • History of nuclear stress test     UNSURE OF DATE   • History of peripheral neuropathy    • History of ventricular septal defect    • History of ventricular septal defect    • Hyperlipidemia    • Hypertension    • Impaired functional mobility, balance, gait, and endurance    • Infectious diarrhea    • Kyphosis, acquired    • Lumbar radiculopathy    • Mitral and aortic valve disease    • Nephrolithiasis    • Phimosis    • Problems with swallowing     WITH FOOD   • Respiratory failure (CMS/HCC)    • Sleep apnea     BIPAP, too awkward to bring   • TIA (transient ischemic attack)     X3.   2014   • Ventral hernia    • Wears glasses     FOR READING       Past Surgical History:   Procedure Laterality Date   • CARDIAC CATHETERIZATION     • CARDIAC CATHETERIZATION N/A 2018    Procedure: Left Heart Cath;  Surgeon: Edouard Robertson MD;  Location: [facility] CATH INVASIVE LOCATION;  Service: Cardiovascular   • CARDIAC  ELECTROPHYSIOLOGY PROCEDURE N/A 1/31/2019    Procedure: PACEMAKER IMPLANTATION- DC;  Surgeon: Omar Encinas DO;  Location:  [facility] EP INVASIVE LOCATION;  Service: Cardiology   • COLONOSCOPY N/A 12/28/2019    Procedure: COLONOSCOPY WITH HOT POLYPECTOMY;  Surgeon: Celio Maldonado MD;  Location: [facility] ENDOSCOPY;  Service: Gastroenterology   • ENDOSCOPY N/A 1/26/2019    Procedure: ESOPHAGOGASTRODUODENOSCOPY;  Surgeon: Brunner, Mark I, MD;  Location:  [facility] ENDOSCOPY;  Service: Gastroenterology   • ENDOSCOPY N/A 12/27/2019    Procedure: ESOPHAGOGASTRODUODENOSCOPY;  Surgeon: Valdemar Mcdonald MD;  Location: [facility] ENDOSCOPY;  Service: General   • HERNIA REPAIR     • ORTHOPEDIC SURGERY Left     Left hip arthroplasty   • PACEMAKER IMPLANTATION  01/31/2019   • SUPRAPUBIC CATHETER INSERTION      History of Percutaneous Catheter Placement Into Ureter   • THROAT SURGERY      FOR SLEEP APNEA   • VSD REPAIR      History of VSD Repair By Patch Closure       Social History     Socioeconomic History   • Marital status:      Spouse name: Not on file   • Number of children: Not on file   • Years of education: Not on file   • Highest education level: Not on file   Occupational History     Employer: RETIRED   Tobacco Use   • Smoking status: Former Smoker     Packs/day: 3.00     Years: 30.00     Pack years: 90.00     Types: Cigarettes     Quit date: 2017     Years since quitting: 3.8   • Smokeless tobacco: Never Used   Substance and Sexual Activity   • Alcohol use: No   • Drug use: No   • Sexual activity: Defer       Family History   Problem Relation Age of Onset   • Other Father         CABG   • Coronary artery disease Father    • Colon cancer Neg Hx        Prior to Admission Medications:  Medications Prior to Admission   Medication Sig Dispense Refill Last Dose   • acetaminophen (TYLENOL) 500 MG tablet Take 1 tablet by mouth Every 6 (Six) Hours As Needed for Mild Pain . 45 tablet 1 10/19/2020 at Unknown time   • alfuzosin  (UROXATRAL) 10 MG 24 hr tablet @@ TAKE ONE TABLET BY MOUTH DAILY 28 tablet 5 10/20/2020 at 0800   • amLODIPine (NORVASC) 5 MG tablet TAKE ONE TABLET BY MOUTH DAILY 90 tablet 3 10/19/2020 at 2100   • atorvastatin (LIPITOR) 10 MG tablet Take 1 tablet by mouth Every Evening. 90 tablet 3 10/19/2020 at 2100   • benzonatate (TESSALON) 200 MG capsule Take 1 capsule by mouth 3 (Three) Times a Day As Needed for Cough. 45 capsule 0 10/19/2020 at 2100   • cephalexin (Keflex) 500 MG capsule Take 1 capsule by mouth 3 (Three) Times a Day. 21 capsule 0 Past Month at Unknown time   • ferrous sulfate 325 (65 Fe) MG tablet TAKE ONE TABLET BY MOUTH DAILY 28 tablet 5 10/20/2020 at 0500   • furosemide (LASIX) 20 MG tablet TAKE ONE TABLET BY MOUTH DAILY 30 tablet 1 10/20/2020 at 0800   • ipratropium (Atrovent) 0.06 % nasal spray 2 sprays into the nostril(s) as directed by provider 4 (Four) Times a Day. 15 mL 1 10/20/2020 at 0800   • ipratropium-albuterol (DUO-NEB) 0.5-2.5 mg/3 ml nebulizer Take 3 mL by nebulization Every 4 (Four) Hours As Needed for Wheezing. 360 mL 11 10/19/2020 at Unknown time   • lidocaine (LIDODERM) 5 % PLACE ONE PATCH ON SKIN AS DIRECTED,REMOVE & DISCARD PATCH WITHIN 12 HOURS 30 patch 3 10/19/2020 at Unknown time   • O2 (OXYGEN) Inhale 4 L/min 1 (One) Time. Walking on 4L and sitting 3L   10/20/2020 at Unknown time   • oxybutynin (DITROPAN) 5 MG tablet OAKE 1 TABLET BY MOUTH DAILY AT BEDTIME 90 tablet 3 10/20/2020 at 0500   • pantoprazole (PROTONIX) 40 MG EC tablet TAKE ONE TABLET BY MOUTH DAILY 28 tablet 5 10/20/2020 at 0500   • predniSONE (DELTASONE) 10 MG tablet Take 1 tablet by mouth Daily. 30 tablet 1 10/20/2020 at 0500   • traZODone (DESYREL) 50 MG tablet Take 50 mg by mouth Every Night.   10/19/2020 at 2100   • umeclidinium-vilanterol (Anoro Ellipta) 62.5-25 MCG/INH aerosol powder  inhaler Inhale 1 puff Daily. 60 each 11 10/19/2020 at Unknown time   • ASPIRIN LOW DOSE 81 MG chewable tablet @@ TAKE ONE TABLET  BY MOUTH DAILY 28 tablet 5 10/13/2020   • clopidogrel (PLAVIX) 75 MG tablet TAKE ONE TABLET BY MOUTH DAILY 90 tablet 3 10/13/2020       Allergies:  Allergies   Allergen Reactions   • Levaquin [Levofloxacin] Hives   • Sulfa Antibiotics Nausea And Vomiting        Vitals: Temp:  [98.4 °F (36.9 °C)] 98.4 °F (36.9 °C)  Heart Rate:  [88] 88  Resp:  [17] 17  BP: (117)/(63) 117/63    Review Of Systems:  Constitutional:  Negative for chills, fever, and unexpected weight change.  Respiratory:  Negative for cough, chest tightness, shortness of breath, and wheezing.  Cardiovascular:  Negative for chest pain, palpitations, and leg swelling.  Gastrointestinal:  Negative for abdominal distention, abdominal pain, Nausea, vomiting.  Neurological:  Negative for Weakness, numbness, and headaches.     Physical Exam:    General Appearance:  Alert, cooperative, in no acute distress.   Lungs:   Clear to auscultation, respirations regular, even and                 unlabored.   Heart:  Regular rhythm and normal rate.   Abdomen:   Normal bowel sounds, no masses, no organomegaly. Soft, non-tender, non-distended   Neurologic: Alert and oriented x 3. Moves all four limbs equally       Plan: ESOPHAGOGASTRODUODENOSCOPY (N/A)     Cale Madrid MD  10/20/2020

## 2020-10-20 NOTE — ANESTHESIA POSTPROCEDURE EVALUATION
Patient: Jani Pena    Procedure Summary     Date: 10/20/20 Room / Location: [facility] ENDOSCOPY 2 / [facility] ENDOSCOPY    Anesthesia Start: 1208 Anesthesia Stop: 1233    Procedure: ESOPHAGOGASTRODUODENOSCOPY WITH DILATION AND COLD FORCEP BIOPSY (N/A Esophagus) Diagnosis:       Esophageal dysphagia      (Esophageal dysphagia [R13.10])    Surgeon: Cale Madrid MD Provider: Jose De Jesus Walden CRNA    Anesthesia Type: MAC ASA Status: 4          Anesthesia Type: MAC    Vitals  Vitals Value Taken Time   /68 10/20/20 1336   Temp 98 °F (36.7 °C) 10/20/20 1240   Pulse 100 10/20/20 1336   Resp 17 10/20/20 1336   SpO2 93 % 10/20/20 1336           Post Anesthesia Care and Evaluation    Patient location during evaluation: PHASE II  Patient participation: complete - patient participated  Level of consciousness: awake and sleepy but conscious  Pain management: adequate  Airway patency: patent  Anesthetic complications: No anesthetic complications  PONV Status: none  Cardiovascular status: acceptable and hemodynamically stable  Respiratory status: acceptable, room air, nonlabored ventilation and spontaneous ventilation  Hydration status: acceptable

## 2020-10-20 NOTE — ANESTHESIA PREPROCEDURE EVALUATION
Anesthesia Evaluation     Patient summary reviewed and Nursing notes reviewed   no history of anesthetic complications:  NPO Solid Status: > 8 hours  NPO Liquid Status: > 8 hours           Airway   Mallampati: I  TM distance: >3 FB  Neck ROM: full  No difficulty expected  Dental - normal exam   (+) partials, poor dentition and upper dentures    Pulmonary    (+) pneumonia resolved , a smoker (2017), COPD (MDI ) moderate, shortness of breath, sleep apnea on CPAP, decreased breath sounds,   Cardiovascular - normal exam    ECG reviewed  PT is on anticoagulation therapy  Patient on routine beta blocker  Rhythm: regular  Rate: normal    (+) hypertension, CAD, CABG, dysrhythmias Atrial Fib, angina with exertion, CHF , AQUINO, hyperlipidemia,     ROS comment: EKG EF45%  Borderline LVH   Moderate LAE  normal LVedp  Aortic sclerosis with mild AI   Mild MR Mild TR with PAsp of 48 mm of hg    Mild dilation of the RV with normal RV Function   Severe HK of the inferior and septal walls   CATH 30% occlusive disease    ECG: PACED    Neuro/Psych  (+) TIA (2014), syncope, numbness, psychiatric history Anxiety and Depression,     (-) CVA  GI/Hepatic/Renal/Endo    (+)  hiatal hernia, GERD,  renal disease CRI and ARF, diabetes mellitus,     Musculoskeletal     (+) back pain, neck pain,   Abdominal  - normal exam    Abdomen: soft.  Bowel sounds: normal.   Substance History      OB/GYN          Other   arthritis, blood dyscrasia anemia,     ROS/Med Hx Other: Anxiety  Atrial fibrillation (CMS/HCC)  Chronic kidney disease  Steroid-dependent COPD (CMS/HCC)  Degeneration of cervical intervertebral disc  Gastroesophageal reflux disease  Diverticulosis  Hemorrhoids  Hiatal hernia  Hyperlipidemia  Kyphosis, acquired  PEDRO on CPAP  Benign prostatic hyperplasia with incomplete bladder emptying  Depression  History of ventricular septal defect  Chronic diastolic congestive heart failure (CMS/HCC)  Coronary artery disease involving native coronary  artery of native heart with angina pectoris (CMS/HCC)  Lumbar radiculopathy  Dysphagia  Anemia  Cardiac pacemaker in situ  Sick sinus syndrome (CMS/HCC)  Chronic respiratory failure with hypoxia and hypercapnia (CMS/HCC)  Flexural eczema  Xerosis of skin  Abnormal urinalysis  Esophageal dysphagia    Impaired functional mobility, balance, gait, and endurance                    Anesthesia Plan    ASA 4     MAC   (Risks and benefits discussed including risk of aspiration, recall and dental damage. All patient questions answered. Will continue with POC.)  intravenous induction     Anesthetic plan, all risks, benefits, and alternatives have been provided, discussed and informed consent has been obtained with: patient.    Plan discussed with CRNA.

## 2020-10-20 NOTE — DISCHARGE INSTRUCTIONS
- Discharge patient to home (ambulatory).   - Mechanical soft diet daily.   - Continue present medications.   - Hold ASA/Plavix for next 24 hours  - Await pathology results.   - Return to my office in 4-6 weeks.   - Will get esophgeal manometry as op

## 2020-10-23 NOTE — TELEPHONE ENCOUNTER
Caller: holger    Relationship: retirement    Best call back number: [phone]    Medication needed:   Requested Prescriptions     Pending Prescriptions Disp Refills   • benzonatate (TESSALON) 200 MG capsule 45 capsule 0     Sig: Take 1 capsule by mouth 3 (Three) Times a Day As Needed for Cough.   • ipratropium-albuterol (DUO-NEB) 0.5-2.5 mg/3 ml nebulizer 360 mL 11     Sig: Take 3 mL by nebulization Every 4 (Four) Hours As Needed for Wheezing.       When do you need the refill by: 10/23/2020    What details did the patient provide when requesting the medication:     Does the patient have less than a 3 day supply:  [] Yes  [] No    What is the patient's preferred pharmacy: [address] [phone] [facility] [phone] FX

## 2020-10-25 NOTE — ED PROVIDER NOTES
Subjective   70 year old male presenting with fall. He states that at some point last night he fell backwards onto a wooden part of his bed. This struck about mid back. He complains of severe pain to this area that is worse with movement and deep breath.  No alleviating factors.  Some neck discomfort as well.  Unsure if he struck his head.  He is on Plavix.  He denies any fevers, chills, shortness of breath, cough, abdominal pain.          Review of Systems   Constitutional: Negative.    HENT: Negative.    Eyes: Negative.    Respiratory: Negative.    Cardiovascular: Negative.    Gastrointestinal: Negative.    Genitourinary: Negative.    Musculoskeletal: Positive for back pain.   Skin: Positive for wound.   Neurological: Negative.    Psychiatric/Behavioral: Negative.        Past Medical History:   Diagnosis Date   • Abnormal liver enzymes    • Anemia    • Anxiety    • Arthritis    • Atherosclerotic heart disease of native coronary artery without angina pectoris    • Atrial fibrillation (CMS/HCC)    • Back pain    • BPH (benign prostatic hyperplasia)    • Cataract, bilateral    • Chest pain     2-3 MONTHS AGO.  PER PT, HAS ADDRESSED WITH CARDIOLOGIST.  STATES HAS NTG PRN.   • CHF (congestive heart failure) (CMS/HCC)    • Chronic bronchitis (CMS/HCC)    • Chronic kidney disease    • COPD (chronic obstructive pulmonary disease) (CMS/HCC)    • Degeneration of cervical intervertebral disc    • Depression    • Diverticulosis    • Dyspnea    • Esophageal reflux    • Esophagitis    • Gastritis    • Hematuria    • Hemorrhoids    • Hiatal hernia    • History of arthritis    • History of nuclear stress test     UNSURE OF DATE   • History of peripheral neuropathy    • History of ventricular septal defect    • History of ventricular septal defect    • Hyperlipidemia    • Hypertension    • Impaired functional mobility, balance, gait, and endurance    • Infectious diarrhea    • Kyphosis, acquired    • Lumbar radiculopathy    •  Mitral and aortic valve disease    • Nephrolithiasis    • Phimosis    • Problems with swallowing     WITH FOOD   • Respiratory failure (CMS/HCC)    • Sleep apnea     BIPAP, too awkward to bring   • TIA (transient ischemic attack)     X3.   2014   • Ventral hernia    • Wears glasses     FOR READING       Allergies   Allergen Reactions   • Levaquin [Levofloxacin] Hives   • Sulfa Antibiotics Nausea And Vomiting       Past Surgical History:   Procedure Laterality Date   • CARDIAC CATHETERIZATION     • CARDIAC CATHETERIZATION N/A 5/24/2018    Procedure: Left Heart Cath;  Surgeon: Edouard Robertson MD;  Location:  [facility] CATH INVASIVE LOCATION;  Service: Cardiovascular   • CARDIAC ELECTROPHYSIOLOGY PROCEDURE N/A 1/31/2019    Procedure: PACEMAKER IMPLANTATION- DC;  Surgeon: Omar Encinas DO;  Location:  [facility] EP INVASIVE LOCATION;  Service: Cardiology   • COLONOSCOPY N/A 12/28/2019    Procedure: COLONOSCOPY WITH HOT POLYPECTOMY;  Surgeon: Celio Maldonado MD;  [facility]  [facility];  Service: Gastroenterology   • ENDOSCOPY N/A 1/26/2019    Procedure: ESOPHAGOGASTRODUODENOSCOPY;  Surgeon: Brunner, Mark I, MD;  Location:  [facility] ENDOSCOPY;  Service: Gastroenterology   • ENDOSCOPY N/A 12/27/2019    Procedure: ESOPHAGOGASTRODUODENOSCOPY;  Surgeon: Valdemar Mcdonald MD;  [facility]:  [facility];  Service: General   • ENDOSCOPY N/A 10/20/2020    Procedure: ESOPHAGOGASTRODUODENOSCOPY WITH DILATION AND COLD FORCEP BIOPSY;  Surgeon: Cale Madrid MD;  Location:  [facility];  Service: Gastroenterology;  Laterality: N/A;   • HERNIA REPAIR     • ORTHOPEDIC SURGERY Left     Left hip arthroplasty   • PACEMAKER IMPLANTATION  01/31/2019   • SUPRAPUBIC CATHETER INSERTION      History of Percutaneous Catheter Placement Into Ureter   • THROAT SURGERY      FOR SLEEP APNEA   • VSD REPAIR      History of VSD Repair By Patch Closure       Family History   Problem Relation Age of Onset   • Other Father         CABG   •  Coronary artery disease Father    • Colon cancer Neg Hx        Social History     Socioeconomic History   • Marital status:      Spouse name: Not on file   • Number of children: Not on file   • Years of education: Not on file   • Highest education level: Not on file   Occupational History     Employer: RETIRED   Tobacco Use   • Smoking status: Former Smoker     Packs/day: 3.00     Years: 30.00     Pack years: 90.00     Types: Cigarettes     Quit date: 2017     Years since quitting: 3.8   • Smokeless tobacco: Never Used   Substance and Sexual Activity   • Alcohol use: No   • Drug use: No   • Sexual activity: Defer           Objective   Physical Exam  Vitals signs reviewed.   Constitutional:       General: He is not in acute distress.     Appearance: He is not toxic-appearing or diaphoretic.      Comments: Chronically ill-appearing   HENT:      Head: Normocephalic and atraumatic.      Right Ear: External ear normal.      Left Ear: External ear normal.      Nose: Nose normal.      Mouth/Throat:      Mouth: Mucous membranes are moist.      Pharynx: Oropharynx is clear.   Eyes:      Extraocular Movements: Extraocular movements intact.      Conjunctiva/sclera: Conjunctivae normal.      Pupils: Pupils are equal, round, and reactive to light.   Neck:      Musculoskeletal: Normal range of motion and neck supple.      Comments: Mild lower cervical tenderness  Cardiovascular:      Rate and Rhythm: Normal rate. Rhythm irregular.      Pulses: Normal pulses.      Heart sounds: Normal heart sounds.   Pulmonary:      Effort: Pulmonary effort is normal. No respiratory distress.      Breath sounds: Normal breath sounds.   Abdominal:      General: Bowel sounds are normal. There is no distension.      Comments: Mild diffuse tenderness   Musculoskeletal: Normal range of motion.         General: No swelling or deformity.      Comments: Tenderness right posterior ribs/flank, all other extremities/joints stable without tenderness    Skin:     General: Skin is warm and dry.      Capillary Refill: Capillary refill takes less than 2 seconds.      Findings: No rash.      Comments: Large hematoma/contusion to the right mid/lower back   Neurological:      General: No focal deficit present.      Mental Status: He is alert and oriented to person, place, and time.   Psychiatric:         Mood and Affect: Mood normal.         Behavior: Behavior normal.         Procedures           ED Course                                           MDM  Number of Diagnoses or Management Options  Fall, initial encounter:   Traumatic hematoma of lower back, initial encounter:   Diagnosis management comments: 70 year old male with fall, contusion, pain. Chronically ill appearing elderly man in no distress with exam as above. He has a large hematoma/contusion to his mid back/flank and associated tenderness. Will obtain labs, EKG and imaging. Will treat symptomatically. Disposition pending.    DDX: fall, contusion, hematoma, fracture, intra abdominal/thoracic injury, ICH    EKG interpreted by me: paced rhythm, no further interpretation, this is an abnormal EKG    Labs are largely unremarkable. Imaging reveals hematoma of the flank consistent with what we see on exam.  There are no other acute findings.  He is resting comfortably.  Will discharge home with pain medication, incentive spirometer and outpatient follow-up.  He is happy with this plan.       Amount and/or Complexity of Data Reviewed  Clinical lab tests: reviewed  Tests in the radiology section of CPT®: reviewed  Decide to obtain previous medical records or to obtain history from someone other than the patient: yes        Final diagnoses:   Fall, initial encounter   Traumatic hematoma of lower back, initial encounter            Edouard Durán MD  10/25/20 [id number]

## 2020-10-26 NOTE — TELEPHONE ENCOUNTER
Caller: [facility]     Relationship: nurse    Best call back number:  [phone]    Medication needed:   Requested Prescriptions     Pending Prescriptions Disp Refills   • HYDROcodone-acetaminophen (NORCO) 5-325 MG per tablet 10 tablet 0     Sig: Take 1 tablet by mouth Every 6 (Six) Hours As Needed for Severe Pain .       When do you need the refill by: 10/26/2020    What details did the patient provide when requesting the medication: Patient has two dosages left    Does the patient have less than a 3 day supply:  [x] Yes  [] No    What is the patient's preferred pharmacy: [address] [phone] [facility] [phone] FX

## 2020-10-27 PROBLEM — K22.2 ESOPHAGEAL STRICTURE: Status: RESOLVED | Noted: 2020-01-01 | Resolved: 2020-01-01
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
## 2020-10-27 PROBLEM — K22.2 ESOPHAGEAL STRICTURE: Status: ACTIVE | Noted: 2020-01-01

## 2020-10-27 PROBLEM — R13.10 DYSPHAGIA: Status: RESOLVED | Noted: 2018-08-14 | Resolved: 2020-01-01

## 2020-10-27 NOTE — PROGRESS NOTES
Subjective   Jani Pena is a 70 y.o. male.     Chief Complaint   Patient presents with   • Congestive Heart Failure       History of Present Illness   Patient here due to 2 days ago morning time getting up from bed to the bathroom patient fell backwards right flank hit the bed developed significant hematoma pain.  Patient went to emergency room CT scan abdomen pelvis chest unremarkable CT scan of the head unremarkable.  Now topical significant hematoma.  CBC stable.  Dysphagia improved after upper endoscopy dilation.  Patient is on stomach medication now.  Congestive heart failure stable now also no new worsening short of breath or chest pain.    Current Outpatient Medications:   •  acetaminophen (TYLENOL) 500 MG tablet, Take 1 tablet by mouth Every 6 (Six) Hours As Needed for Mild Pain ., Disp: 45 tablet, Rfl: 1  •  alfuzosin (UROXATRAL) 10 MG 24 hr tablet, @@ TAKE ONE TABLET BY MOUTH DAILY, Disp: 28 tablet, Rfl: 5  •  amLODIPine (NORVASC) 5 MG tablet, TAKE ONE TABLET BY MOUTH DAILY, Disp: 90 tablet, Rfl: 3  •  ASPIRIN LOW DOSE 81 MG chewable tablet, @@ TAKE ONE TABLET BY MOUTH DAILY, Disp: 28 tablet, Rfl: 5  •  atorvastatin (LIPITOR) 10 MG tablet, Take 1 tablet by mouth Every Evening., Disp: 90 tablet, Rfl: 3  •  benzonatate (TESSALON) 200 MG capsule, TAKE ONE CAPSULE BY MOUTH THREE TIMES A DAY AS NEEDED FOR COUGH, Disp: 45 capsule, Rfl: 0  •  cephalexin (Keflex) 500 MG capsule, Take 1 capsule by mouth 3 (Three) Times a Day., Disp: 21 capsule, Rfl: 0  •  clopidogrel (PLAVIX) 75 MG tablet, TAKE ONE TABLET BY MOUTH DAILY, Disp: 90 tablet, Rfl: 3  •  ferrous sulfate 325 (65 Fe) MG tablet, TAKE ONE TABLET BY MOUTH DAILY, Disp: 28 tablet, Rfl: 5  •  furosemide (LASIX) 20 MG tablet, TAKE ONE TABLET BY MOUTH DAILY, Disp: 30 tablet, Rfl: 1  •  HYDROcodone-acetaminophen (NORCO) 5-325 MG per tablet, Take 1 tablet by mouth Every 6 (Six) Hours As Needed for Severe Pain ., Disp: 30 tablet, Rfl: 0  •  ipratropium  (Atrovent) 0.06 % nasal spray, 2 sprays into the nostril(s) as directed by provider 4 (Four) Times a Day., Disp: 15 mL, Rfl: 1  •  ipratropium-albuterol (DUO-NEB) 0.5-2.5 mg/3 ml nebulizer, Take 3 mL by nebulization Every 4 (Four) Hours As Needed for Wheezing., Disp: 360 mL, Rfl: 11  •  lidocaine (LIDODERM) 5 %, PLACE ONE PATCH ON SKIN AS DIRECTED,REMOVE & DISCARD PATCH WITHIN 12 HOURS, Disp: 30 patch, Rfl: 3  •  O2 (OXYGEN), Inhale 4 L/min 1 (One) Time. Walking on 4L and sitting 3L, Disp: , Rfl:   •  oxybutynin (DITROPAN) 5 MG tablet, OAKE 1 TABLET BY MOUTH DAILY AT BEDTIME, Disp: 90 tablet, Rfl: 3  •  pantoprazole (PROTONIX) 40 MG EC tablet, TAKE ONE TABLET BY MOUTH DAILY, Disp: 28 tablet, Rfl: 5  •  predniSONE (DELTASONE) 10 MG tablet, Take 1 tablet by mouth Daily., Disp: 30 tablet, Rfl: 1  •  traZODone (DESYREL) 50 MG tablet, Take 50 mg by mouth Every Night., Disp: , Rfl:   •  umeclidinium-vilanterol (Anoro Ellipta) 62.5-25 MCG/INH aerosol powder  inhaler, Inhale 1 puff Daily., Disp: 60 each, Rfl: 11    The following portions of the patient's history were reviewed and updated as appropriate: allergies, current medications, past family history, past medical history, past social history, past surgical history and problem list.    Review of Systems   Constitutional: Negative.    Respiratory: Negative.    Cardiovascular: Negative.    Gastrointestinal: Negative.    Musculoskeletal:        Right flank contusion,    Skin: Positive for color change ( right flank hematoma).   Psychiatric/Behavioral: Negative.        Objective   Physical Exam  Constitutional:       Appearance: He is well-developed.   Neck:      Musculoskeletal: Neck supple.   Cardiovascular:      Rate and Rhythm: Normal rate and regular rhythm.      Heart sounds: Normal heart sounds.   Pulmonary:      Effort: Pulmonary effort is normal.      Breath sounds: Normal breath sounds.   Abdominal:      General: Bowel sounds are normal.      Palpations: Abdomen  is soft.   Musculoskeletal:         General: Tenderness present.      Comments: Bruise right flank   Neurological:      Mental Status: He is alert and oriented to person, place, and time.   Psychiatric:         Behavior: Behavior normal.         All tests have been reviewed.    Assessment/Plan   Diagnoses and all orders for this visit:    Hematoma continue to watch warm compress should be helpful    Fall, initial encounter slow to rise may be beneficial to use bedside urinal     Esophageal stricture improved after dilation continue medication    Follow-up as scheduled.

## 2020-11-10 NOTE — TELEPHONE ENCOUNTER
Caller: SHUN WITH [facility]    Relationship:     Best call back number: [phone]    Medication needed:   Requested Prescriptions     Pending Prescriptions Disp Refills   • HYDROcodone-acetaminophen (NORCO) 5-325 MG per tablet 30 tablet 0     Sig: Take 1 tablet by mouth Every 6 (Six) Hours As Needed for Severe Pain .       When do you need the refill by: ASAP    What details did the patient provide when requesting the medication: 2 DAYS WORTH LEFT - STILL IN PAIN AND BRUISED FROM A FALL    Does the patient have less than a 3 day supply:  [x] Yes  [] No    What is the patient's preferred pharmacy: [facility] [address] [place] [address] [phone] [facility] [phone] FX

## 2020-11-12 NOTE — TELEPHONE ENCOUNTER
Jacquie saez/ Jan[facility] called and needs to know if we received the OT update from 10/30/20. If this is signed, please fax to [phone] and if we did not receive then Jacquie can be reached at [phone].

## 2020-11-17 NOTE — PROGRESS NOTES
"Chief Complaint   Patient presents with   • Shortness of Breath         Subjective   Jani Pena is a 70 y.o. male.     History of Present Illness   Patient comes today for follow up of shortness of breath and COPD.     He had an exacerbation in September and was prescribed prednisone by his PCP. He has been on prednisone 10 mg daily since then.    Patient is using medications, as prescribed.  He uses Trelegy daily.  He uses duo nebs 3 times a day.    He has BiPAP but does not use it nightly. He uses oxygen continuously at 2-3 LPM.    He lives at [facility].    He feels his exercise tolerance has decreased and states he has intermittent worsening of shortness of breath.    Quit smoking 4 years ago.    The following portions of the patient's history were reviewed and updated as appropriate: allergies, current medications, past family history, past medical history, past social history and past surgical history.    Review of Systems   HENT: Negative for sinus pressure, sneezing and sore throat.    Respiratory: Positive for cough, chest tightness, shortness of breath and wheezing.        Objective   Visit Vitals  /68   Pulse 84   Temp 96.9 °F (36.1 °C)   Resp 20   Ht 172.7 cm (67.99\")   Wt 78.5 kg (173 lb)   SpO2 92% Comment: on room air (REST)   BMI 26.31 kg/m²       Physical Exam  Vitals signs reviewed.   HENT:      Head: Atraumatic.      Mouth/Throat:      Mouth: Mucous membranes are moist.      Pharynx: Oropharynx is clear.      Comments: Crowded oropharynx. Dentures noted.  Eyes:      Extraocular Movements: Extraocular movements intact.   Neck:      Musculoskeletal: Neck supple.   Cardiovascular:      Rate and Rhythm: Normal rate and regular rhythm.   Pulmonary:      Effort: Pulmonary effort is normal. No respiratory distress.      Comments: Somewhat decreased air entry without wheezing.  Musculoskeletal:      Comments: Gait with rolling walker.   Skin:     General: Skin is warm. "   Neurological:      Mental Status: He is alert and oriented to person, place, and time.         Assessment/Plan   Diagnoses and all orders for this visit:    1. Shortness of breath (Primary)    2. Hypoxia    3. Chronic obstructive pulmonary disease, unspecified COPD type (CMS/HCC)    4. Chronic respiratory failure with hypoxia and hypercapnia (CMS/HCC)    Other orders  -     Fluticasone-Umeclidin-Vilant (Trelegy Ellipta) 100-62.5-25 MCG/INH aerosol powder ; Inhale 1 puff Daily.  Dispense: 1 each; Refill: 6  -     ipratropium-albuterol (DUO-NEB) 0.5-2.5 mg/3 ml nebulizer; Take 3 mL by nebulization Every 4 (Four) Hours As Needed for Wheezing.  Dispense: 360 mL; Refill: 6           Return for keep appt in april.    DISCUSSION (if any):  I reviewed his PFT results from today.  I am not sure of the reliability of the results as he seemed to have difficulty performing the test.  The PFT shows very severe obstruction.  Air-trapping is suggested with a significantly decreased diffusion capacity.    No change to the current medications has been made.  He has severe to very severe COPD and is on maximum inhaler therapy.  I have asked him to continue using Trelegy and duo nebs as directed.  He should continue taking prednisone until he has finished the prescription from his PCP which looks to be about 2 months worth.    He has never tried Daliresp so this is an option we can try in the future.    I have asked him to use his BiPAP machine every night as this will help overall with his COPD and shortness of breath.  He verbalizes understanding.  I will asked the office staff to obtain a compliance from [facility].    He should continue using oxygen at night as well as with activity.    Compliance with medications stressed.     Side effects of prescribed medications discussed with the patient.    He has received a flu vaccine this year.    I reviewed his CT of the chest from September which shows stable appearance of a noncalcified  nodule.  He will need to continue annual low-dose CT screening which will be due in September 2021.    IMPRESSION:  1.  Stable appearance of the chest with slight improvement seen in the  atelectatic changes in the right mid lung. The noncalcified nodule is  stable as well as the areas of scarring and pleural thickening on the  fissures. There is no new parenchymal disease. No new pulmonary mass or  nodule.     2.  Lung RADS Category 2-continue annual low-dose CT screening.    Dictated utilizing Dragon dictation.    This document was electronically signed by PAL Gibson November 17, 2020  14:31 EST

## 2020-11-30 PROBLEM — K22.89 PRESBYESOPHAGUS: Status: ACTIVE | Noted: 2020-01-01

## 2020-11-30 NOTE — PATIENT INSTRUCTIONS
1. Antireflux measures: Avoid fried, fatty foods, alcohol, chocolate, coffee, tea,  soft drinks, peppermint and spearmint, spicy foods, tomatoes and tomato based foods, onion based foods, and smoking. Other antireflux measures include weight reduction if overweight, avoiding tight clothing around the abdomen, elevating the head of the bed 6 inches with blocks under the head board, and don't drink or eat before going to bed and avoid lying down immediately after meals.  2. Pantoprazole 40 mg 1 by mouth in the am 30 minutes before breakfast.  3. Eat at a slow rate. Put the fork/spoon down between bites to help with slowing down eating. Take small bites and sips. Avoid drinking through straws. Avoid consecutive drinking of liquids as this requires sustaining airway closure which is difficult for people with lung disease.  4. Do not eat when short of breath; do recovery breathing (pursed lip breathing) first. Exhale after swallowing.  Do not exercise immediately prior to eating.  5. ALWAYS eat in an upright position and do not lie down right after eating.  6. Do not talk and eat at the same time. Speech requires coordinated timing of breathing, as does swallowing.   7. Labs  8. Esophageal manometry  9. Follow up: 3 months or sooner if any problems

## 2020-11-30 NOTE — PROGRESS NOTES
"     Follow Up Note     Date: 2020   Patient Name: Jani Pena  MRN: [id number]  : 1950     Primary Care Provider: Miguel Rojas MD     Chief Complaint:    Chief Complaint   Patient presents with   • Follow-up   • Difficulty Swallowing     History of present illness:   2020  Jani Pena is a 70 y.o. male who is here today for follow up after EGD for difficulty swallowing.     He has had improvement with swallowing, but it has not completely resolved. No history of weight loss, in fact, he has gained weight.    Interval History:  2020  The patient has a history of difficulty swallowing for several years. He has had multiple EGDs in the past with dilations with significant improvement of swallowing. About 2-3 months ago, the patient had started having difficulty swallowing solids that has gradually worsened. He has difficulty swallowing solids, no difficulty swallowing liquids. The foods will become \"stuck\" in the upper esophagus about every time he eats. No history of weight loss.     The patient denies abdominal pain, nausea or vomiting. He has a history of GERD for many years and is on PPI therapy with reasonable control. The patient denies constipation or diarrhea. There is no history of rectal bleeding.      The patient had EGD in 2019 by Dr. Oneil with gastroparesis and presbyesophagus noted. He had another EGD in [place] in 2019 and had esophagus \"stretched\" (we do not have those records). The patient had EGD in 2019 by Dr. Moe that was  \"normal\". The patient had colonoscopy in 2019 by Dr. Moe with 1 benign mucosal tag removed, otherwise unremarkable.    Subjective      Past Medical History:   Diagnosis Date   • Abnormal liver enzymes    • Anemia    • Anxiety    • Arthritis    • Atherosclerotic heart disease of native coronary artery without angina pectoris    • Atrial fibrillation (CMS/HCC)    • Back pain    • BPH (benign prostatic " hyperplasia)    • Cataract, bilateral    • Chest pain     2-3 MONTHS AGO.  PER PT, HAS ADDRESSED WITH CARDIOLOGIST.  STATES HAS NTG PRN.   • CHF (congestive heart failure) (CMS/HCC)    • Chronic bronchitis (CMS/HCC)    • Chronic kidney disease    • COPD (chronic obstructive pulmonary disease) (CMS/HCC)    • Degeneration of cervical intervertebral disc    • Depression    • Diverticulosis    • Dyspnea    • Esophageal reflux    • Esophagitis    • Gastritis    • Hematuria    • Hemorrhoids    • Hiatal hernia    • History of arthritis    • History of nuclear stress test     UNSURE OF DATE   • History of peripheral neuropathy    • History of ventricular septal defect    • History of ventricular septal defect    • Hyperlipidemia    • Hypertension    • Impaired functional mobility, balance, gait, and endurance    • Infectious diarrhea    • Kyphosis, acquired    • Lumbar radiculopathy    • Mitral and aortic valve disease    • Nephrolithiasis    • Phimosis    • Problems with swallowing     WITH FOOD   • Respiratory failure (CMS/HCC)    • Sleep apnea     BIPAP, too awkward to bring   • TIA (transient ischemic attack)     X3.   2014   • Ventral hernia    • Wears glasses     FOR READING     Past Surgical History:   Procedure Laterality Date   • CARDIAC CATHETERIZATION     • CARDIAC CATHETERIZATION N/A 5/24/2018    Procedure: Left Heart Cath;  Surgeon: Edouard Robertson MD;  Location:  [facility];  Service: Cardiovascular   • CARDIAC ELECTROPHYSIOLOGY PROCEDURE N/A 1/31/2019    Procedure: PACEMAKER IMPLANTATION- DC;  Surgeon: Omar Encinas DO;  Location:  [facility];  Service: Cardiology   • COLONOSCOPY N/A 12/28/2019    Procedure: COLONOSCOPY WITH HOT POLYPECTOMY;  Surgeon: Celio Maldonado MD;  [facility]:  [facility];  Service: Gastroenterology   • ENDOSCOPY N/A 1/26/2019    Procedure: ESOPHAGOGASTRODUODENOSCOPY;  Surgeon: Brunner, Mark I, MD;  Location:  [facility];  Service:  Gastroenterology   • ENDOSCOPY N/A 12/27/2019    Procedure: ESOPHAGOGASTRODUODENOSCOPY;  Surgeon: Valdemar Mcdonald MD;  Location: [facility] ENDOSCOPY;  Service: General   • ENDOSCOPY N/A 10/20/2020    Procedure: ESOPHAGOGASTRODUODENOSCOPY WITH DILATION AND COLD FORCEP BIOPSY;  Surgeon: Cale Madrid MD;  Location: [facility] ENDOSCOPY;  Service: Gastroenterology;  Laterality: N/A;   • HERNIA REPAIR     • ORTHOPEDIC SURGERY Left     Left hip arthroplasty   • PACEMAKER IMPLANTATION  01/31/2019   • SUPRAPUBIC CATHETER INSERTION      History of Percutaneous Catheter Placement Into Ureter   • THROAT SURGERY      FOR SLEEP APNEA   • VSD REPAIR      History of VSD Repair By Patch Closure     Family History   Problem Relation Age of Onset   • Other Father         CABG   • Coronary artery disease Father    • Colon cancer Neg Hx      Social History     Socioeconomic History   • Marital status:      Spouse name: Not on file   • Number of children: Not on file   • Years of education: Not on file   • Highest education level: Not on file   Occupational History     Employer: RETIRED   Tobacco Use   • Smoking status: Former Smoker     Packs/day: 3.00     Years: 30.00     Pack years: 90.00     Types: Cigarettes     Quit date: 2017     Years since quitting: 3.9   • Smokeless tobacco: Never Used   Substance and Sexual Activity   • Alcohol use: No   • Drug use: No   • Sexual activity: Defer       Current Outpatient Medications:   •  acetaminophen (TYLENOL) 500 MG tablet, Take 1 tablet by mouth Every 6 (Six) Hours As Needed for Mild Pain ., Disp: 45 tablet, Rfl: 1  •  alfuzosin (UROXATRAL) 10 MG 24 hr tablet, @@ TAKE ONE TABLET BY MOUTH DAILY, Disp: 28 tablet, Rfl: 5  •  amLODIPine (NORVASC) 5 MG tablet, TAKE ONE TABLET BY MOUTH DAILY, Disp: 90 tablet, Rfl: 3  •  ASPIRIN LOW DOSE 81 MG chewable tablet, @@ TAKE ONE TABLET BY MOUTH DAILY, Disp: 28 tablet, Rfl: 5  •  atorvastatin (LIPITOR) 10 MG tablet, Take 1 tablet by mouth  Every Evening., Disp: 90 tablet, Rfl: 3  •  carboxymethylcellulose (REFRESH PLUS) 0.5 % solution, 1 drop 3 (Three) Times a Day As Needed for Dry Eyes., Disp: , Rfl:   •  clopidogrel (PLAVIX) 75 MG tablet, TAKE ONE TABLET BY MOUTH DAILY, Disp: 90 tablet, Rfl: 3  •  docusate sodium (COLACE) 100 MG capsule, Take 100 mg by mouth 2 (Two) Times a Day., Disp: , Rfl:   •  escitalopram (LEXAPRO) 20 MG tablet, Take 20 mg by mouth Daily., Disp: , Rfl:   •  ferrous sulfate 325 (65 Fe) MG tablet, TAKE ONE TABLET BY MOUTH DAILY, Disp: 28 tablet, Rfl: 5  •  finasteride (PROSCAR) 5 MG tablet, Take 5 mg by mouth Daily., Disp: , Rfl:   •  fluocinonide (LIDEX) 0.05 % cream, Apply  topically to the appropriate area as directed 2 (Two) Times a Day., Disp: , Rfl:   •  Fluticasone-Umeclidin-Vilant (Trelegy Ellipta) 100-62.5-25 MCG/INH aerosol powder , Inhale 1 puff Daily., Disp: 1 each, Rfl: 6  •  furosemide (LASIX) 20 MG tablet, TAKE ONE TABLET BY MOUTH DAILY, Disp: 30 tablet, Rfl: 1  •  HYDROcodone-acetaminophen (NORCO) 5-325 MG per tablet, TAKE 1 TABLET BY MOUTH EVERY 8 HOURS AS NEEDED FOR SEVERE PAIN, Disp: 45 tablet, Rfl: 0  •  hydrocortisone-eucerin, Apply  topically to the appropriate area as directed., Disp: , Rfl:   •  ipratropium (Atrovent) 0.06 % nasal spray, 2 sprays into the nostril(s) as directed by provider 4 (Four) Times a Day., Disp: 15 mL, Rfl: 1  •  ipratropium-albuterol (DUO-NEB) 0.5-2.5 mg/3 ml nebulizer, Take 3 mL by nebulization Every 4 (Four) Hours As Needed for Wheezing., Disp: 360 mL, Rfl: 6  •  lidocaine (LIDODERM) 5 %, PLACE ONE PATCH ON SKIN AS DIRECTED,REMOVE & DISCARD PATCH WITHIN 12 HOURS, Disp: 30 patch, Rfl: 3  •  melatonin 5 MG tablet tablet, @@ TAKE 1 TABLET BY MOUTH EVERY EVENING, Disp: 90 tablet, Rfl: 3  •  mirtazapine (REMERON) 15 MG tablet, Take 15 mg by mouth Every Night., Disp: , Rfl:   •  O2 (OXYGEN), Inhale 4 L/min 1 (One) Time. Walking on 4L and sitting 3L, Disp: , Rfl:   •  oxybutynin  (DITROPAN) 5 MG tablet, OAKE 1 TABLET BY MOUTH DAILY AT BEDTIME, Disp: 90 tablet, Rfl: 3  •  pantoprazole (PROTONIX) 40 MG EC tablet, TAKE ONE TABLET BY MOUTH DAILY, Disp: 90 tablet, Rfl: 3  •  potassium chloride (MICRO-K) 8 MEQ CR capsule, Take 10 mEq by mouth Daily., Disp: , Rfl:   •  predniSONE (DELTASONE) 10 MG tablet, Take 1 tablet by mouth Daily., Disp: 30 tablet, Rfl: 1  •  promethazine (PHENERGAN) 25 MG tablet, Take 25 mg by mouth As Needed for Nausea or Vomiting., Disp: , Rfl:   •  traZODone (DESYREL) 50 MG tablet, Take 50 mg by mouth Every Night., Disp: , Rfl:   •  benzonatate (TESSALON) 200 MG capsule, TAKE ONE CAPSULE BY MOUTH THREE TIMES A DAY AS NEEDED FOR COUGH, Disp: 45 capsule, Rfl: 0  •  cephalexin (Keflex) 500 MG capsule, Take 1 capsule by mouth 3 (Three) Times a Day., Disp: 21 capsule, Rfl: 0     Allergies   Allergen Reactions   • Levaquin [Levofloxacin] Hives   • Sulfa Antibiotics Nausea And Vomiting     Review of Systems   Constitutional: Negative for appetite change and unexpected weight loss.   HENT: Positive for trouble swallowing.    Eyes: Negative for blurred vision.   Respiratory: Negative for choking and chest tightness.    Cardiovascular: Negative for leg swelling.   Gastrointestinal: Positive for GERD. Negative for abdominal distention, abdominal pain, anal bleeding, blood in stool, constipation, diarrhea, nausea, rectal pain, vomiting and indigestion.   Endocrine: Negative for polyphagia.   Genitourinary: Negative for hematuria.   Musculoskeletal: Negative for arthralgias and myalgias.   Skin: Negative for rash.   Allergic/Immunologic: Negative for food allergies.   Neurological: Negative for dizziness, syncope and confusion.   Hematological: Does not bruise/bleed easily.   Psychiatric/Behavioral: Negative for depressed mood.      The following portions of the patient's history were reviewed and updated as appropriate: allergies, current medications, past family history, past medical  history, past social history, past surgical history and problem list.  Objective     Physical Exam  Vitals signs and nursing note reviewed.   Constitutional:       General: He is not in acute distress.     Appearance: Normal appearance. He is well-developed. He is not diaphoretic.   HENT:      Head: Normocephalic and atraumatic.      Right Ear: Hearing and external ear normal.      Left Ear: Hearing and external ear normal.      Nose: Nose normal.      Mouth/Throat:      Mouth: Mucous membranes are not pale, not dry and not cyanotic. No oral lesions.      Pharynx: No oropharyngeal exudate.   Eyes:      General: Lids are normal.         Right eye: No discharge.         Left eye: No discharge.      Conjunctiva/sclera: Conjunctivae normal.   Neck:      Musculoskeletal: Neck supple. No edema.      Thyroid: No thyroid mass or thyromegaly.      Vascular: No JVD.      Trachea: Trachea normal.   Cardiovascular:      Rate and Rhythm: Normal rate and regular rhythm.      Heart sounds: Normal heart sounds and S2 normal. No murmur. No friction rub. No gallop. No S3 sounds.    Pulmonary:      Effort: Pulmonary effort is normal. Tachypnea present. No respiratory distress.      Breath sounds: Normal breath sounds.   Chest:      Chest wall: No tenderness.   Abdominal:      General: Bowel sounds are normal. There is no distension.      Palpations: Abdomen is soft. Abdomen is not rigid. There is no hepatomegaly, splenomegaly or mass.      Tenderness: There is no abdominal tenderness. There is no guarding or rebound.      Hernia: No hernia is present.   Lymphadenopathy:      Cervical: No cervical adenopathy.      Upper Body:      Left upper body: No supraclavicular adenopathy.   Skin:     General: Skin is warm and dry.      Coloration: Skin is not pale.      Findings: No rash.      Nails: There is no clubbing.     Neurological:      Mental Status: He is alert and oriented to person, place, and time.      Cranial Nerves: No cranial  "nerve deficit.      Sensory: No sensory deficit.   Psychiatric:         Mood and Affect: Mood normal.         Speech: Speech normal.         Behavior: Behavior is cooperative.       Vitals:    11/30/20 0959   BP: 159/79   Pulse: 94   Resp: 20   Temp: 97.3 °F (36.3 °C)   Weight: 80.7 kg (178 lb)   Height: 172.7 cm (68\")     Results Review:   I reviewed the patient's new clinical results.    Admission on 10/25/2020, Discharged on 10/25/2020   Component Date Value Ref Range Status   • Glucose 10/25/2020 127* 65 - 99 mg/dL Final   • BUN 10/25/2020 39* 8 - 23 mg/dL Final   • Creatinine 10/25/2020 1.69* 0.76 - 1.27 mg/dL Final   • Sodium 10/25/2020 142  136 - 145 mmol/L Final   • Potassium 10/25/2020 4.4  3.5 - 5.2 mmol/L Final   • Chloride 10/25/2020 104  98 - 107 mmol/L Final   • CO2 10/25/2020 29.9* 22.0 - 29.0 mmol/L Final   • Calcium 10/25/2020 9.1  8.6 - 10.5 mg/dL Final   • Total Protein 10/25/2020 6.6  6.0 - 8.5 g/dL Final   • Albumin 10/25/2020 4.50  3.50 - 5.20 g/dL Final   • ALT (SGPT) 10/25/2020 9  1 - 41 U/L Final   • AST (SGOT) 10/25/2020 14  1 - 40 U/L Final   • Alkaline Phosphatase 10/25/2020 58  39 - 117 U/L Final   • Total Bilirubin 10/25/2020 0.4  0.0 - 1.2 mg/dL Final   • eGFR Non African Amer 10/25/2020 40* >60 mL/min/1.73 Final   • Globulin 10/25/2020 2.1  gm/dL Final   • A/G Ratio 10/25/2020 2.1  g/dL Final   • BUN/Creatinine Ratio 10/25/2020 23.1  7.0 - 25.0 Final   • Anion Gap 10/25/2020 8.1  5.0 - 15.0 mmol/L Final   • Troponin T 10/25/2020 0.030  0.000 - 0.030 ng/mL Final   • WBC 10/25/2020 9.13  3.40 - 10.80 10*3/mm3 Final   • RBC 10/25/2020 4.21  4.14 - 5.80 10*6/mm3 Final   • Hemoglobin 10/25/2020 12.5* 13.0 - 17.7 g/dL Final   • Hematocrit 10/25/2020 40.7  37.5 - 51.0 % Final   • MCV 10/25/2020 96.7  79.0 - 97.0 fL Final   • MCH 10/25/2020 29.7  26.6 - 33.0 pg Final   • MCHC 10/25/2020 30.7* 31.5 - 35.7 g/dL Final   • RDW 10/25/2020 11.9* 12.3 - 15.4 % Final   • RDW-SD 10/25/2020 42.3  37.0 " - 54.0 fl Final   • MPV 10/25/2020 8.7  6.0 - 12.0 fL Final   • Platelets 10/25/2020 184  140 - 450 10*3/mm3 Final   • Neutrophil % 10/25/2020 85.2* 42.7 - 76.0 % Final   • Lymphocyte % 10/25/2020 7.1* 19.6 - 45.3 % Final   • Monocyte % 10/25/2020 6.1  5.0 - 12.0 % Final   • Eosinophil % 10/25/2020 1.0  0.3 - 6.2 % Final   • Basophil % 10/25/2020 0.4  0.0 - 1.5 % Final   • Immature Grans % 10/25/2020 0.2  0.0 - 0.5 % Final   • Neutrophils, Absolute 10/25/2020 7.77* 1.70 - 7.00 10*3/mm3 Final   • Lymphocytes, Absolute 10/25/2020 0.65* 0.70 - 3.10 10*3/mm3 Final   • Monocytes, Absolute 10/25/2020 0.56  0.10 - 0.90 10*3/mm3 Final   • Eosinophils, Absolute 10/25/2020 0.09  0.00 - 0.40 10*3/mm3 Final   • Basophils, Absolute 10/25/2020 0.04  0.00 - 0.20 10*3/mm3 Final   • Immature Grans, Absolute 10/25/2020 0.02  0.00 - 0.05 10*3/mm3 Final   • nRBC 10/25/2020 0.0  0.0 - 0.2 /100 WBC Final   Admission on 10/20/2020, Discharged on 10/20/2020   Component Date Value Ref Range Status   • KOH Prep 10/20/2020 Yeast and hyphal elements seen* No yeast or hyphal elements seen Final   • Case Report 10/20/2020    Final                    Value:Surgical Pathology Report                         Case: [id number]                                  Authorizing Provider:  Cale Madrid MD  Collected:           10/20/2020 12:27 PM          Ordering Location:     [facility]    Received:            10/20/2020 01:44 PM                                 [facility]                                                                    Pathologist:           Terrance Melissa MD                                                       Specimens:   1) - Gastric, Antrum, GASTRIC ANTRUM FOR H.PYLORI                                                   2) - Esophagus, DISTAL, MID AND PROXIMAL ESOPHAGEAL BIOPSIES FOR DYPHAGIA                 • Final Diagnosis 10/20/2020    Final                    Value:This result contains rich text  formatting which cannot be displayed here.   Lab on 10/16/2020   Component Date Value Ref Range Status   • SARS-CoV-2 BARBER 10/16/2020 Not Detected  Not Detected Final   Hospital Outpatient Visit on 10/07/2020   Component Date Value Ref Range Status   • Uric Acid 10/07/2020 10.1* 3.4 - 7.0 mg/dL Final   • Glucose 10/07/2020 121* 65 - 99 mg/dL Final   • BUN 10/07/2020 54* 8 - 23 mg/dL Final   • Creatinine 10/07/2020 2.02* 0.76 - 1.27 mg/dL Final   • Sodium 10/07/2020 139  136 - 145 mmol/L Final   • Potassium 10/07/2020 5.2  3.5 - 5.2 mmol/L Final   • Chloride 10/07/2020 98  98 - 107 mmol/L Final   • CO2 10/07/2020 29.1* 22.0 - 29.0 mmol/L Final   • Calcium 10/07/2020 9.2  8.6 - 10.5 mg/dL Final   • Albumin 10/07/2020 4.50  3.50 - 5.20 g/dL Final   • Phosphorus 10/07/2020 2.8  2.5 - 4.5 mg/dL Final   • Anion Gap 10/07/2020 11.9  5.0 - 15.0 mmol/L Final   • BUN/Creatinine Ratio 10/07/2020 26.7* 7.0 - 25.0 Final   • eGFR Non African Amer 10/07/2020 33* >60 mL/min/1.73 Final   • WBC 10/07/2020 7.15  3.40 - 10.80 10*3/mm3 Final   • RBC 10/07/2020 4.43  4.14 - 5.80 10*6/mm3 Final   • Hemoglobin 10/07/2020 13.5  13.0 - 17.7 g/dL Final   • Hematocrit 10/07/2020 42.2  37.5 - 51.0 % Final   • MCV 10/07/2020 95.3  79.0 - 97.0 fL Final   • MCH 10/07/2020 30.5  26.6 - 33.0 pg Final   • MCHC 10/07/2020 32.0  31.5 - 35.7 g/dL Final   • RDW 10/07/2020 11.9* 12.3 - 15.4 % Final   • RDW-SD 10/07/2020 42.0  37.0 - 54.0 fl Final   • MPV 10/07/2020 8.7  6.0 - 12.0 fL Final   • Platelets 10/07/2020 232  140 - 450 10*3/mm3 Final   Office Visit on 10/01/2020   Component Date Value Ref Range Status   • Color 10/01/2020 Yellow  Yellow, Straw, Dark Yellow, Olya Final   • Clarity, UA 10/01/2020 Clear  Clear Final   • Specific Gravity  10/01/2020 1.020  1.005 - 1.030 Final   • pH, Urine 10/01/2020 6.0  5.0 - 8.0 Final   • Leukocytes 10/01/2020 Negative  Negative Final   • Nitrite, UA 10/01/2020 Negative  Negative Final   • Protein, POC 10/01/2020  Negative  Negative mg/dL Final   • Glucose, UA 10/01/2020 Negative  Negative, 1000 mg/dL (3+) mg/dL Final   • Ketones, UA 10/01/2020 Negative  Negative Final   • Urobilinogen, UA 10/01/2020 Normal  Normal Final   • Bilirubin 10/01/2020 Negative  Negative Final   • Blood, UA 10/01/2020 Negative  Negative Final   • Specific Gravity, UA 10/01/2020 1.012  1.005 - 1.030 Final   • pH, UA 10/01/2020 5.5  5.0 - 8.0 Final   • Color, UA 10/01/2020 Yellow   Final    REFERENCE RANGE: Yellow, Straw   • Appearance, UA 10/01/2020 Clear  Clear Final   • Leukocytes, UA 10/01/2020 See below:* Negative Final    Small (1+)   • Protein 10/01/2020 Negative  Negative Final   • Glucose, UA 10/01/2020 Negative  Negative Final   • Ketones 10/01/2020 Negative  Negative Final   • Blood, UA 10/01/2020 Negative  Negative Final   • Bilirubin, UA 10/01/2020 Negative  Negative Final   • Urobilinogen, UA 10/01/2020 Comment   Final    Comment: 0.2 E.U./dL  REFERENCE RANGE: 0.2 - 1.0 E.U./dL     • Nitrite, UA 10/01/2020 Negative  Negative Final   • WBC, UA 10/01/2020 6-12* /HPF Final    REFERENCE RANGE: None Seen, 0-2   • RBC, UA 10/01/2020 0-2  /HPF Final    REFERENCE RANGE: None Seen, 0-2   • Epithelial Cells (non renal) 10/01/2020 0-2  /HPF Final    REFERENCE RANGE: None Seen, 0-2   • Cast Type 10/01/2020 Comment   Final    HYALINE CASTS, UA            3-6              /LPF       None Seen  N   • Bacteria, UA 10/01/2020 Comment  None Seen /HPF Final    None Seen   Admission on 09/25/2020, Discharged on 09/25/2020   Component Date Value Ref Range Status   • Glucose 09/25/2020 125* 65 - 99 mg/dL Final   • BUN 09/25/2020 56* 8 - 23 mg/dL Final   • Creatinine 09/25/2020 2.38* 0.76 - 1.27 mg/dL Final   • Sodium 09/25/2020 141  136 - 145 mmol/L Final   • Potassium 09/25/2020 5.2  3.5 - 5.2 mmol/L Final   • Chloride 09/25/2020 100  98 - 107 mmol/L Final   • CO2 09/25/2020 30.1* 22.0 - 29.0 mmol/L Final   • Calcium 09/25/2020 9.4  8.6 - 10.5 mg/dL Final    • Total Protein 09/25/2020 6.8  6.0 - 8.5 g/dL Final   • Albumin 09/25/2020 4.50  3.50 - 5.20 g/dL Final   • ALT (SGPT) 09/25/2020 10  1 - 41 U/L Final   • AST (SGOT) 09/25/2020 15  1 - 40 U/L Final   • Alkaline Phosphatase 09/25/2020 58  39 - 117 U/L Final   • Total Bilirubin 09/25/2020 0.6  0.0 - 1.2 mg/dL Final   • eGFR Non African Amer 09/25/2020 27* >60 mL/min/1.73 Final   • Globulin 09/25/2020 2.3  gm/dL Final   • A/G Ratio 09/25/2020 2.0  g/dL Final   • BUN/Creatinine Ratio 09/25/2020 23.5  7.0 - 25.0 Final   • Anion Gap 09/25/2020 10.9  5.0 - 15.0 mmol/L Final   • Color, UA 09/25/2020 Yellow  Yellow, Straw Final   • Appearance, UA 09/25/2020 Clear  Clear Final   • pH, UA 09/25/2020 <=5.0  5.0 - 8.0 Final   • Specific Gravity, UA 09/25/2020 1.011  1.005 - 1.030 Final   • Glucose, UA 09/25/2020 Negative  Negative Final   • Ketones, UA 09/25/2020 Negative  Negative Final   • Bilirubin, UA 09/25/2020 Negative  Negative Final   • Blood, UA 09/25/2020 Negative  Negative Final   • Protein, UA 09/25/2020 Negative  Negative Final   • Leuk Esterase, UA 09/25/2020 Trace* Negative Final   • Nitrite, UA 09/25/2020 Negative  Negative Final   • Urobilinogen, UA 09/25/2020 0.2 E.U./dL  0.2 - 1.0 E.U./dL Final   • Troponin T 09/25/2020 0.034* 0.000 - 0.030 ng/mL Final   • proBNP 09/25/2020 2,668.0* 0.0 - 900.0 pg/mL Final   • WBC 09/25/2020 6.86  3.40 - 10.80 10*3/mm3 Final   • RBC 09/25/2020 4.28  4.14 - 5.80 10*6/mm3 Final   • Hemoglobin 09/25/2020 13.1  13.0 - 17.7 g/dL Final   • Hematocrit 09/25/2020 41.4  37.5 - 51.0 % Final   • MCV 09/25/2020 96.7  79.0 - 97.0 fL Final   • MCH 09/25/2020 30.6  26.6 - 33.0 pg Final   • MCHC 09/25/2020 31.6  31.5 - 35.7 g/dL Final   • RDW 09/25/2020 12.1* 12.3 - 15.4 % Final   • RDW-SD 09/25/2020 42.7  37.0 - 54.0 fl Final   • MPV 09/25/2020 8.8  6.0 - 12.0 fL Final   • Platelets 09/25/2020 200  140 - 450 10*3/mm3 Final   • Neutrophil % 09/25/2020 85.8* 42.7 - 76.0 % Final   •  Lymphocyte % 09/25/2020 7.7* 19.6 - 45.3 % Final   • Monocyte % 09/25/2020 5.7  5.0 - 12.0 % Final   • Eosinophil % 09/25/2020 0.1* 0.3 - 6.2 % Final   • Basophil % 09/25/2020 0.4  0.0 - 1.5 % Final   • Immature Grans % 09/25/2020 0.3  0.0 - 0.5 % Final   • Neutrophils, Absolute 09/25/2020 5.88  1.70 - 7.00 10*3/mm3 Final   • Lymphocytes, Absolute 09/25/2020 0.53* 0.70 - 3.10 10*3/mm3 Final   • Monocytes, Absolute 09/25/2020 0.39  0.10 - 0.90 10*3/mm3 Final   • Eosinophils, Absolute 09/25/2020 0.01  0.00 - 0.40 10*3/mm3 Final   • Basophils, Absolute 09/25/2020 0.03  0.00 - 0.20 10*3/mm3 Final   • Immature Grans, Absolute 09/25/2020 0.02  0.00 - 0.05 10*3/mm3 Final   • nRBC 09/25/2020 0.0  0.0 - 0.2 /100 WBC Final   • RBC, UA 09/25/2020 None Seen  None Seen /HPF Final   • WBC, UA 09/25/2020 0-2* None Seen /HPF Final   • Bacteria, UA 09/25/2020 None Seen  None Seen /HPF Final   • Squamous Epithelial Cells, UA 09/25/2020 0-2  None Seen, 0-2 /HPF Final   • Hyaline Casts, UA 09/25/2020 0-2  None Seen /LPF Final   • Methodology 09/25/2020 Manual Light Microscopy   Final   • Site 09/25/2020 Right Brachial   Final   • Mynor's Test 09/25/2020 N/A   Final   • pH, Arterial 09/25/2020 7.446  7.300 - 7.500 pH units Final   • pCO2, Arterial 09/25/2020 43.8  35.0 - 45.0 mm Hg Final   • pO2, Arterial 09/25/2020 159.0* 75.0 - 100.0 mm Hg Final    83 Value above reference range   • HCO3, Arterial 09/25/2020 30.1* 22.0 - 28.0 mmol/L Final    83 Value above reference range   • Base Excess, Arterial 09/25/2020 5.3* 0.0 - 2.0 mmol/L Final    83 Value above reference range   • O2 Saturation, Arterial 09/25/2020 99.6  94.0 - 100.0 % Final    83 Value above reference range   • Hematocrit, Blood Gas 09/25/2020 39.6  % Final   • Oxyhemoglobin 09/25/2020 98.4  94 - 99 % Final   • Methemoglobin 09/25/2020 0.70  0.00 - 1.50 % Final   • Carboxyhemoglobin 09/25/2020 <0.6  0 - 2 % Final    94 Value below reportable range < 0.6   • A-a Gradiant  09/25/2020    Final    UNABLE TO CALCULATE   • Barometric Pressure for Blood Gas 09/25/2020 733  mmHg Final   • Modality 09/25/2020 Nasal Cannula   Final   • FIO2 09/25/2020 28  % Final   • Flow Rate 09/25/2020 2.0  lpm Final   • Ventilator Mode 09/25/2020 NA   Final    Meter: [id number]     :  [id number]   • Troponin T 09/25/2020 0.029  0.000 - 0.030 ng/mL Final   • Urine Culture 09/25/2020 <25,000 CFU/mL Gram Positive Cocci*  Final   Hospital Outpatient Visit on 09/09/2020   Component Date Value Ref Range Status   • WBC 09/09/2020 7.51  3.40 - 10.80 10*3/mm3 Final   • RBC 09/09/2020 4.07* 4.14 - 5.80 10*6/mm3 Final   • Hemoglobin 09/09/2020 12.3* 13.0 - 17.7 g/dL Final   • Hematocrit 09/09/2020 39.5  37.5 - 51.0 % Final   • MCV 09/09/2020 97.1* 79.0 - 97.0 fL Final   • MCH 09/09/2020 30.2  26.6 - 33.0 pg Final   • MCHC 09/09/2020 31.1* 31.5 - 35.7 g/dL Final   • RDW 09/09/2020 12.4  12.3 - 15.4 % Final   • RDW-SD 09/09/2020 44.3  37.0 - 54.0 fl Final   • MPV 09/09/2020 8.7  6.0 - 12.0 fL Final   • Platelets 09/09/2020 229  140 - 450 10*3/mm3 Final      Ct Abdomen Pelvis Without Contrast    Result Date: 10/25/2020   1. Soft tissue contusion of the right flank without loculated fluid collection. 2. Gallstones. 3. Otherwise no acute intra-abdominal or intrapelvic abnormality on this unenhanced exam.    This study was performed with techniques to keep radiation doses as low as reasonably achievable (ALARA). Individualized dose reduction techniques using automated exposure control or adjustment of mA and/or kV according to the patient size were employed.  This report was finalized on 10/25/2020 11:00 AM by Aga Win MD.    Ct Head Without Contrast    Result Date: 10/25/2020  No acute intracranial abnormality. Stable exam.      This study was performed with techniques to keep radiation doses as low as reasonably achievable (ALARA). Individualized dose reduction techniques using automated exposure  control or adjustment of mA and/or kV according to the patient size were employed.  This report was finalized on 10/25/2020 10:46 AM by Aga Win MD.    Ct Head Without Contrast    Result Date: 9/25/2020  No acute intracranial abnormality. Authenticated by Jorge Luis Hanna III, MD on 09/25/2020 04:46:19 PM    Ct Chest Without Contrast    Result Date: 10/25/2020  No acute abnormality. Stable right upper lobe pulmonary nodules. Chronic appearing right lateral 11th rib fracture.      This study was performed with techniques to keep radiation doses as low as reasonably achievable (ALARA). Individualized dose reduction techniques using automated exposure control or adjustment of mA and/or kV according to the patient size were employed.  This report was finalized on 10/25/2020 10:56 AM by Aga Win MD.    Ct Cervical Spine Without Contrast    Result Date: 10/25/2020  No acute fracture of the cervical spine. Degenerative disc disease, similar to prior.      This study was performed with techniques to keep radiation doses as low as reasonably achievable (ALARA). Individualized dose reduction techniques using automated exposure control or adjustment of mA and/or kV according to the patient size were employed.  This report was finalized on 10/25/2020 10:51 AM by Aga Win MD.    Xr Chest 1 View    Result Date: 9/25/2020  Chronic appearing findings. Followup PA and lateral views would be helpful for a more complete evaluation.  This report was finalized on 9/25/2020 3:58 PM by Dwight Erickson MD.    Ct Chest Low Dose Wo    Result Date: 9/23/2020  1.  Stable appearance of the chest with slight improvement seen in the atelectatic changes in the right mid lung. The noncalcified nodule is stable as well as the areas of scarring and pleural thickening on the fissures. There is no new parenchymal disease. No new pulmonary mass or nodule.  2.  Lung RADS Category 2-continue annual low-dose CT screening.  D:  09/21/2020 E:   09/21/2020  This report was finalized on 9/23/2020 12:45 PM by Dr. Nikki Wright MD.       Colonoscopy per Dr. Celio Maldonado dated 12/28/2019 with 1 7 mm polyp at the rectosigmoid colon.  Mild diverticulosis in sigmoid colon and descending colon.  Rectal polyp biopsy with mucosal tag with crush/cautery artifact.  No dysplasia or malignancy.    EGD dated 10/20/2020 no endoscopic esophageal abnormality to explain patient's dysphagia except subtle age-related concentric rings and tertiary contractions.  Esophagus dilated.  Erythematous mucosa in the antrum.  Patient likely has age-related presbyesophagus, no convincing signs of achalasia.  Gastric antrum biopsy with mild reactive gastropathy.  No H. pylori.  Esophagus biopsies distal mid and proximal with squamous mucosa with mild nonspecific chronic inflammation.  Negative for dysplasia, carcinoma or increased intraepithelial eosinophils.    Assessment / Plan      1. Esophageal dysphagia  2. Presbyesophagus  Patient has a history of difficulty swallowing for several years.  Improved after recent EGD with empiric dilation, but not resolved. No history of weight loss.  No endoscopic esophageal abnormality to explain patient's dysphagia except subtle age-related concentric rings and tertiary contractions.  Patient likely has age-related presbyesophagus, no convincing signs of achalasia. CT chest with no GI abnormality noted.  COPD can contribute to difficulty swallowing: Eat at a slow rate. Put the fork/spoon down between bites to help with slowing down eating. Take small bites and sips. Avoid drinking through straws.  Avoid consecutive drinking of liquids as this requires sustaining airway closure which is difficult for people with lung disease.   Do not eat when short of breath; do recovery breathing (pursed lip breathing) first. Exhale after swallowing. Do not exercise immediately prior to eating.  ALWAYS eat in an upright position and do not lie down right after  eating.  Do not talk and eat at the same time. Speech requires coordinated timing of breathing, as does swallowing.   Referral for esophageal manometry to rule out other causes.    - Ambulatory Referral to Gastroenterology    3. Anemia, unspecified type  Longstanding history of mild anemia.  The patient denies overt GI bleed.  No source of anemia per colonoscopy in 2019 or EGD in 2020.  The patient has a history of kidney disease as well as COPD. MCV normal.  Suspect anemia of chronic disease.  He is obtaining Procrit injections per nephrology as needed. CTAP with unremarkable GI tract.    - CBC & Differential; Future  - Comprehensive Metabolic Panel; Future  - Vitamin B12; Future  - Methylmalonic Acid, Serum; Future  - Iron Profile; Future    4. Gastroesophageal reflux disease without esophagitis  Reasonable control with PPI therapy. Will continue same.    Patient Instructions   1. Antireflux measures: Avoid fried, fatty foods, alcohol, chocolate, coffee, tea,  soft drinks, peppermint and spearmint, spicy foods, tomatoes and tomato based foods, onion based foods, and smoking. Other antireflux measures include weight reduction if overweight, avoiding tight clothing around the abdomen, elevating the head of the bed 6 inches with blocks under the head board, and don't drink or eat before going to bed and avoid lying down immediately after meals.  2. Pantoprazole 40 mg 1 by mouth in the am 30 minutes before breakfast.  3. Eat at a slow rate. Put the fork/spoon down between bites to help with slowing down eating. Take small bites and sips. Avoid drinking through straws. Avoid consecutive drinking of liquids as this requires sustaining airway closure which is difficult for people with lung disease.  4. Do not eat when short of breath; do recovery breathing (pursed lip breathing) first. Exhale after swallowing.  Do not exercise immediately prior to eating.  5. ALWAYS eat in an upright position and do not lie down right  after eating.  6. Do not talk and eat at the same time. Speech requires coordinated timing of breathing, as does swallowing.   7. Labs  8. Esophageal manometry  9. Follow up: 3 months or sooner if any problems    Janki Lozano, APRN  11/30/2020    Please note that portions of this note may have been completed with a voice recognition program. Efforts were made to edit the dictations, but occasionally words are mistranscribed.

## 2020-12-07 NOTE — TELEPHONE ENCOUNTER
Caller: ROCKYON    Relationship:   Best call back number: [phone]    Medication needed:   Requested Prescriptions     Pending Prescriptions Disp Refills   • docusate sodium (COLACE) 100 MG capsule        Sig: Take 1 capsule by mouth.       When do you need the refill by: ASAP    What details did the patient provide when requesting the medication:NO BOWEL MOVEMENT FOR 3-4 DAYS    Does the patient have less than a 3 day supply:  [x] Yes  [] No    What is the patient's preferred pharmacy:    [address] [phone] [facility] [phone]

## 2020-12-07 NOTE — TELEPHONE ENCOUNTER
Caller: TOMI    Relationship: Self    Best call back number: [phone]    What orders are you requesting (i.e. lab or imaging): SOMETHING FOR BOWEL MOVEMENT, PRN.    In what timeframe would the patient need to come in: ASAP    Where will you receive your lab/imaging services:     Additional notes:    CHAN FROM [facility] CALLED REGARDING SOMETHING FOR MAYA GALVEZ TO HAVE FOR PRN FOR BOWEL MOVEMENT. HE IS HAVING STOMACH CRAMPS AND THE COLACE 2 X DAY AND HASN'T HELPED.        [place], [place] - [address] [place] [address] [phone] [facility] [phone] FX

## 2020-12-08 NOTE — TELEPHONE ENCOUNTER
CHAN FROM [facility] CALLED REQUESTING AN UPDATE FOR:  PRN ORDER BECAUSE PT HASN'T HAD A BOWEL MOVEMENT SINCE THURSDAY    PLEASE ADVISE AT: [phone]

## 2020-12-08 NOTE — TELEPHONE ENCOUNTER
Called and left detailed vm on Stacey's phone notifying her that a laxative has been called in for patient and it was sent to [facility]

## 2020-12-08 NOTE — TELEPHONE ENCOUNTER
CHAN CALLED CHECKING ON STATUS OF VERBAL ORDER.  DAVID STATES THAT THEY ARE WAITING ON  TO APPROVE VERBAL ORDER AND WAS TOLD TO CALL BACK IF SHE HASN'T HEARD FROM OFFICE BY 2 PM TODAY      PLEASE ADVISE  [phone]

## 2020-12-13 PROBLEM — N39.0 SEPSIS SECONDARY TO UTI (HCC): Status: ACTIVE | Noted: 2020-01-01

## 2020-12-13 PROBLEM — A41.9 SEPSIS SECONDARY TO UTI (HCC): Status: ACTIVE | Noted: 2020-01-01

## 2020-12-14 NOTE — ED PROVIDER NOTES
Subjective   70-year-old male who presents to the emergency department with concerns for abdominal pain.  Patient reports that he has had progressively worsening pain and nausea for 1 week.  States pain is a constant, moderate to severe aching and he feels like he cannot urinate.  It was noted on triage the patient did have a low-grade fever.  Patient states he does feel more short of breath than usual but that is not why he came in today.  Patient states he does have a headache as well.  He has no chest pain.          Review of Systems   All other systems reviewed and are negative.      Past Medical History:   Diagnosis Date   • Abnormal liver enzymes    • Anemia    • Anxiety    • Arthritis    • Atherosclerotic heart disease of native coronary artery without angina pectoris    • Atrial fibrillation (CMS/HCC)    • Back pain    • BPH (benign prostatic hyperplasia)    • Cataract, bilateral    • Chest pain     2-3 MONTHS AGO.  PER PT, HAS ADDRESSED WITH CARDIOLOGIST.  STATES HAS NTG PRN.   • CHF (congestive heart failure) (CMS/HCC)    • Chronic bronchitis (CMS/HCC)    • Chronic kidney disease    • COPD (chronic obstructive pulmonary disease) (CMS/HCC)    • Degeneration of cervical intervertebral disc    • Depression    • Diverticulosis    • Dyspnea    • Esophageal reflux    • Esophagitis    • Gastritis    • Hematuria    • Hemorrhoids    • Hiatal hernia    • History of arthritis    • History of nuclear stress test     UNSURE OF DATE   • History of peripheral neuropathy    • History of ventricular septal defect    • History of ventricular septal defect    • Hyperlipidemia    • Hypertension    • Impaired functional mobility, balance, gait, and endurance    • Infectious diarrhea    • Kyphosis, acquired    • Lumbar radiculopathy    • Mitral and aortic valve disease    • Nephrolithiasis    • Phimosis    • Problems with swallowing     WITH FOOD   • Respiratory failure (CMS/HCC)    • Sleep apnea     BIPAP, too awkward to bring    • TIA (transient ischemic attack)     X3.   2014   • Ventral hernia    • Wears glasses     FOR READING       Allergies   Allergen Reactions   • Levaquin [Levofloxacin] Hives   • Sulfa Antibiotics Nausea And Vomiting       Past Surgical History:   Procedure Laterality Date   • CARDIAC CATHETERIZATION     • CARDIAC CATHETERIZATION N/A 5/24/2018    Procedure: Left Heart Cath;  Surgeon: Edouard Robertson MD;  Location:  [facility];  Service: Cardiovascular   • CARDIAC ELECTROPHYSIOLOGY PROCEDURE N/A 1/31/2019    Procedure: PACEMAKER IMPLANTATION- DC;  Surgeon: Omar Encinas DO;  Location:  [facility]AS[facility];  Service: Cardiology   • COLONOSCOPY N/A 12/28/2019    Procedure: COLONOSCOPY WITH HOT POLYPECTOMY;  Surgeon: Celio Maldonado MD;  Location: [facility] ENDOSCOPY;  Service: Gastroenterology   • ENDOSCOPY N/A 1/26/2019    Procedure: ESOPHAGOGASTRODUODENOSCOPY;  Surgeon: Brunner, Mark I, MD;  Location:  [facility];  Service: Gastroenterology   • ENDOSCOPY N/A 12/27/2019    Procedure: ESOPHAGOGASTRODUODENOSCOPY;  Surgeon: Valdemar Mcdonald MD;  Location: [facility] ENDOSCOPY;  Service: General   • ENDOSCOPY N/A 10/20/2020    Procedure: ESOPHAGOGASTRODUODENOSCOPY WITH DILATION AND COLD FORCEP BIOPSY;  Surgeon: Cale Madrid MD;  Location: [facility] ENDOSCOPY;  Service: Gastroenterology;  Laterality: N/A;   • HERNIA REPAIR     • ORTHOPEDIC SURGERY Left     Left hip arthroplasty   • PACEMAKER IMPLANTATION  01/31/2019   • SUPRAPUBIC CATHETER INSERTION      History of Percutaneous Catheter Placement Into Ureter   • THROAT SURGERY      FOR SLEEP APNEA   • VSD REPAIR      History of VSD Repair By Patch Closure       Family History   Problem Relation Age of Onset   • Other Father         CABG   • Coronary artery disease Father    • Colon cancer Neg Hx        Social History     Socioeconomic History   • Marital status:      Spouse name: Not on file   • Number of children: Not on file   •  Years of education: Not on file   • Highest education level: Not on file   Occupational History     Employer: RETIRED   Tobacco Use   • Smoking status: Former Smoker     Packs/day: 3.00     Years: 30.00     Pack years: 90.00     Types: Cigarettes     Quit date: 2017     Years since quitting: 3.9   • Smokeless tobacco: Never Used   Substance and Sexual Activity   • Alcohol use: No   • Drug use: No   • Sexual activity: Defer           Objective   Physical Exam  Vitals signs and nursing note reviewed.     GEN: Patient appears chronically ill and uncomfortable  Head: Normocephalic, atraumatic  Eyes: Pupils equal round reactive to light  ENT: Posterior pharynx normal in appearance, oral mucosa is moist  Chest: Patient has bilateral implanted devices in upper anterior chest  Cardiovascular: Regular rate  Lungs: Diminished breath sounds bilaterally  Abdomen: Soft, tender to palpation of the lower abdomen otherwise nontender, nondistended, no peritoneal signs  Neuro: GCS 15  Psych: Mood and affect are appropriate    Procedures           ED Course  ED Course as of Dec 13 2305   Sun Dec 13, 2020   2026 EKG shows electronic regular pacemaker with a rate of 96.  No other interpretation possible.  Normal EKG.  Interpreted by me.    [DT]   2130 WBC(!): 18.89 [DT]   2130 Troponin T(!!): 0.040 [DT]   2130 proBNP(!): 5,898.0 [DT]   2130 COVID19: Not Detected [DT]   2130 Lactate: 1.0 [DT]   2130 BUN(!): 44 [DT]   2130 Creatinine(!): 2.26 [DT]   2255 RBC, UA(!): 6-12 [DT]   2255 WBC, UA(!): 31-50 [DT]   2255 Bacteria, UA(!): 3+ [DT]   2255 Leukocytes, UA(!): Moderate (2+) [DT]   2256 Discussed case with Dr. Snell, hospitalist, who graciously accepted for hospitalization.    [DT]   2256 CT Abdomen Pelvis Without Contrast  Order: [id number]  Status:  Final result   Visible to patient:  No (not released) Next appt:  01/12/2021 at 11:00 AM in Internal Medicine (Miguel Rojas MD)  Details      Reading Physician Reading Date Result  Priority  Geovayn Mullins MD  [phone] 12/13/2020     Narrative & Impression    FINAL REPORT     CLINICAL HISTORY:  abdominal pain, fever     FINDINGS:  Multiple contiguous transaxial slices through the abdomen and  pelvis were obtained without the intravenous ministration of  contrast with coronal reformatted images.  There is bilateral  atelectasis versus scarring.  The liver is heterogeneous.  There  is a small gallstone without gallbladder wall thickening.  The  pancreas, spleen and adrenal glands are unremarkable.  Right  renal vascular calcification without evidence for obstructive  uropathy.  Nonspecific bilateral perinephric fat stranding.  The  bowel gas pattern is nonobstructive.  There is diverticulosis  without diverticulitis.  No evidence for appendicitis.  Moderate  stool is present.  The bladder wall is thickened, nonspecific.  There is streak artifact from left hip prosthesis.  Aorta and  iliac arteries are calcified.     IMPRESSION:  Nonspecific bladder wall thickening, cannot exclude cystitis  diverticulosis without diverticulitis  cholelithiasis     Authenticated by Geovany Mullins MD on 12/13/2020 09:59:14 PM            [DT]   2257 XR Chest 1 View  Order: [id number]  Status:  Final result   Visible to patient:  No (not released) Next appt:  01/12/2021 at 11:00 AM in Internal Medicine (Miguel Rojas MD)  Details      Reading Physician Reading Date Result Priority  Geovany Mullins MD  [phone] 12/13/2020     Narrative & Impression    FINAL REPORT     CLINICAL HISTORY:  soa     FINDINGS:  Interstitial prominence is consistent with chronic lung disease.  There is nonspecific bilateral lower lobe airspace disease.  The  heart is enlarged.  Aortic knob is calcified.  Pacemaker and  neural stimulators are present.     IMPRESSION:  Chronic changes with bilateral airspace disease     Authenticated by Geovany Mullins MD on 12/13/2020 09:57:00 PM            [DT]      ED Course User  Index  [DT] Bigg Campos MD                                           MDM  Number of Diagnoses or Management Options  Acute kidney insufficiency:   Sepsis secondary to UTI (CMS/[facility]):   Diagnosis management comments: Blood cultures were sent.  Antibiotics were initiated.       Amount and/or Complexity of Data Reviewed  Clinical lab tests: reviewed  Tests in the radiology section of CPT®: reviewed  Discussion of test results with the performing providers: yes  Decide to obtain previous medical records or to obtain history from someone other than the patient: yes  Obtain history from someone other than the patient: yes  Review and summarize past medical records: yes  Discuss the patient with other providers: yes  Independent visualization of images, tracings, or specimens: yes        Final diagnoses:   Sepsis secondary to UTI (CMS/HCC)   Acute kidney insufficiency            Bigg Campos MD  12/13/20 5925

## 2020-12-14 NOTE — ED NOTES
House Supervisor, Fatemeh, called at this time to request a Med/Surg bed. Fatemeh assigned pt bed 427.      [place] April Aracelis  12/13/20 [id number]

## 2020-12-14 NOTE — PLAN OF CARE
Goal Outcome Evaluation:  Plan of Care Reviewed With: patient     Outcome Summary: Patient participates well in skilled PT and demonstrates decreased activity tolerance, balance and strength with resulting difficulty performing mobility tasks.  He is expected to benefit from additional PT services while hospitalized and upon discharge to [facility].

## 2020-12-14 NOTE — THERAPY EVALUATION
Patient Name: Jani Pena  : 1950    MRN: [id number]                              Today's Date: 2020       Admit Date: 2020    Visit Dx:     ICD-10-CM ICD-9-CM   1. Sepsis secondary to UTI (CMS/HCC)  A41.9 038.9    N39.0 995.91     599.0   2. Acute kidney insufficiency  N28.9 593.9     Patient Active Problem List   Diagnosis   • Anxiety   • Atrial fibrillation (CMS/HCC)   • Chronic kidney disease   • Steroid-dependent COPD (CMS/HCC)   • Degeneration of cervical intervertebral disc   • Gastroesophageal reflux disease   • Diverticulosis   • Hemorrhoids   • Hiatal hernia   • Hyperlipidemia   • Kyphosis, acquired   • PEDRO on CPAP   • Benign prostatic hyperplasia with incomplete bladder emptying   • Depression   • History of ventricular septal defect   • Chronic diastolic congestive heart failure (CMS/HCC)   • Coronary artery disease involving native coronary artery of native heart with angina pectoris (CMS/HCC)   • Lumbar radiculopathy   • Anemia   • Cardiac pacemaker in situ   • Sick sinus syndrome (CMS/HCC)   • Chronic respiratory failure with hypoxia and hypercapnia (CMS/HCC)   • Hematoma   • Flexural eczema   • Xerosis of skin   • Abnormal urinalysis   • Esophageal dysphagia   • Fall   • Presbyesophagus   • Sepsis secondary to UTI (CMS/HCC)     Past Medical History:   Diagnosis Date   • Abnormal liver enzymes    • Anemia    • Anxiety    • Arthritis    • Atherosclerotic heart disease of native coronary artery without angina pectoris    • Atrial fibrillation (CMS/HCC)    • Back pain    • BPH (benign prostatic hyperplasia)    • Cataract, bilateral    • Chest pain     2-3 MONTHS AGO.  PER PT, HAS ADDRESSED WITH CARDIOLOGIST.  STATES HAS NTG PRN.   • CHF (congestive heart failure) (CMS/HCC)    • Chronic bronchitis (CMS/HCC)    • Chronic kidney disease    • COPD (chronic obstructive pulmonary disease) (CMS/HCC)    • Degeneration of cervical intervertebral disc    • Depression    • Diverticulosis     • Dyspnea    • Esophageal reflux    • Esophagitis    • Gastritis    • Hematuria    • Hemorrhoids    • Hiatal hernia    • History of arthritis    • History of nuclear stress test     UNSURE OF DATE   • History of peripheral neuropathy    • History of ventricular septal defect    • History of ventricular septal defect    • Hyperlipidemia    • Hypertension    • Impaired functional mobility, balance, gait, and endurance    • Infectious diarrhea    • Kyphosis, acquired    • Lumbar radiculopathy    • Mitral and aortic valve disease    • Nephrolithiasis    • Phimosis    • Problems with swallowing     WITH FOOD   • Respiratory failure (CMS/HCC)    • Sleep apnea     BIPAP, too awkward to bring   • TIA (transient ischemic attack)     X3.   2014   • Ventral hernia    • Wears glasses     FOR READING     Past Surgical History:   Procedure Laterality Date   • CARDIAC CATHETERIZATION     • CARDIAC CATHETERIZATION N/A 5/24/2018    Procedure: Left Heart Cath;  Surgeon: Edouard Robertson MD;  Location:  [facility];  Service: Cardiovascular   • CARDIAC ELECTROPHYSIOLOGY PROCEDURE N/A 1/31/2019    Procedure: PACEMAKER IMPLANTATION- DC;  Surgeon: Omar Encinas DO;  Location:  [facility];  Service: Cardiology   • COLONOSCOPY N/A 12/28/2019    Procedure: COLONOSCOPY WITH HOT POLYPECTOMY;  Surgeon: Celio Maldonado MD;  [facility]  [facility];  Service: Gastroenterology   • ENDOSCOPY N/A 1/26/2019    Procedure: ESOPHAGOGASTRODUODENOSCOPY;  Surgeon: Brunner, Mark I, MD;  Location:  [facility];  Service: Gastroenterology   • ENDOSCOPY N/A 12/27/2019    Procedure: ESOPHAGOGASTRODUODENOSCOPY;  Surgeon: Valdemar Mcdonald MD;  Location: [facility] ENDOSCOPY;  Service: General   • ENDOSCOPY N/A 10/20/2020    Procedure: ESOPHAGOGASTRODUODENOSCOPY WITH DILATION AND COLD FORCEP BIOPSY;  Surgeon: Cale Madrid MD;  Location: [facility] ENDOSCOPY;  Service: Gastroenterology;  Laterality: N/A;   • HERNIA  REPAIR     • ORTHOPEDIC SURGERY Left     Left hip arthroplasty   • PACEMAKER IMPLANTATION  01/31/2019   • SUPRAPUBIC CATHETER INSERTION      History of Percutaneous Catheter Placement Into Ureter   • THROAT SURGERY      FOR SLEEP APNEA   • VSD REPAIR      History of VSD Repair By Patch Closure     General Information     [facility] Name 12/14/20 1231          OT Time and Intention    Document Type  evaluation  -     Mode of Treatment  occupational therapy  -[facility] Name 12/14/20 1231          General Information    Patient Profile Reviewed  yes  -     Prior Level of Function  independent:;all household mobility;ADL's  -     Existing Precautions/Restrictions  fall;oxygen therapy device and L/min  -     Barriers to Rehab  medically complex  -[facility] Name 12/14/20 1231          Occupational Profile    Reason for Services/Referral (Occupational Profile)  ADL decline  -[facility] Name 12/14/20 1231          Living Environment    Lives With  facility resident  -[facility] Name 12/14/20 1231          Home Main Entrance    Number of Stairs, Main Entrance  none  -[facility] Name 12/14/20 1231          Cognition    Orientation Status (Cognition)  oriented x 4  -[facility] Name 12/14/20 1231          Safety Issues, Functional Mobility    Safety Issues Affecting Function (Mobility)  insight into deficits/self-awareness;safety precaution awareness;safety precautions follow-through/compliance  -     Impairments Affecting Function (Mobility)  balance;shortness of breath;endurance/activity tolerance;strength  -       User Key  (r) = Recorded By, (t) = Taken By, (c) = Cosigned By    Initials Name Provider Type    Akilah Del Angel Occupational Therapist        Mobility/ADL's     [facility] Name 12/14/20 1234          Bed Mobility    Bed Mobility  supine-sit  -     Supine-Sit [place] (Bed Mobility)  contact guard  -     Assistive Device (Bed Mobility)  head of bed elevated  -[facility] Name 12/14/20 1235           Transfers    Transfers  sit-stand transfer  -     Sit-Stand [place] (Transfers)  contact guard  -[facility] Name 12/14/20 1234          Sit-Stand Transfer    Assistive Device (Sit-Stand Transfers)  walker, front-wheeled  -[facility] Name 12/14/20 1234          Functional Mobility    Functional Mobility- Ind. Level  contact guard assist  -     Functional Mobility- Device  rolling walker  -     Functional Mobility-Distance (Feet)  12  -     Functional Mobility- Safety Issues  supplemental O2  -[facility] Name 12/14/20 1234          Activities of Daily Living    BADL Assessment/Intervention  bathing;upper body dressing;lower body dressing;grooming;feeding;toileting  -[facility] Name 12/14/20 1234          Bathing Assessment/Intervention    [place] Level (Bathing)  contact guard assist  -[facility] Name 12/14/20 1234          Upper Body Dressing Assessment/Training    [place] Level (Upper Body Dressing)  set up  -[facility] Name 12/14/20 1234          Lower Body Dressing Assessment/Training    [place] Level (Lower Body Dressing)  contact guard assist  -[facility] Name 12/14/20 1234          Grooming Assessment/Training    [place] Level (Grooming)  set up  -AH     Row Name 12/14/20 1234          Self-Feeding Assessment/Training    [place] Level (Feeding)  set up  -[facility] Name 12/14/20 1234          Toileting Assessment/Training    [place] Level (Toileting)  supervision  -       User Key  (r) = Recorded By, (t) = Taken By, (c) = Cosigned By    Initials Name Provider Type    Akilah Del Angel Occupational Therapist        Obj/Interventions     [facility] Name 12/14/20 1240          Vision Assessment/Intervention    Visual Impairment/Limitations  WFL  -[facility] Name 12/14/20 1240          Range of Motion Comprehensive    General Range of Motion  bilateral upper extremity ROM L  [facility] Name 12/14/20 1240          Strength Comprehensive (MMT)    Comment, General Manual Muscle  Testing (MMT) Assessment  BUE 4-/5  -       User Key  (r) = Recorded By, (t) = Taken By, (c) = Cosigned By    Initials Name Provider Type    Akilah Del Angel Occupational Therapist        Goals/Plan     Row Name 12/14/20 1242          Transfer Goal 1 (OT)    Activity/Assistive Device (Transfer Goal 1, OT)  sit-to-stand/stand-to-sit;walker, rolling  -     [place] Level/Cues Needed (Transfer Goal 1, OT)  modified independence  -     Time Frame (Transfer Goal 1, OT)  by discharge  -     Progress/Outcome (Transfer Goal 1, OT)  goal ongoing  -     Row Name 12/14/20 1242          Dressing Goal 1 (OT)    Activity/Device (Dressing Goal 1, OT)  lower body dressing  -     [place]/Cues Needed (Dressing Goal 1, OT)  set-up required  -     Time Frame (Dressing Goal 1, OT)  by discharge  -     Progress/Outcome (Dressing Goal 1, OT)  goal ongoing  -[facility] Name 12/14/20 1242          Toileting Goal 1 (OT)    Activity/Device (Toileting Goal 1, OT)  adjust/manage clothing;perform perineal hygiene  -     [place] Level/Cues Needed (Toileting Goal 1, OT)  modified independence  -     Time Frame (Toileting Goal 1, OT)  by discharge  -     Progress/Outcome (Toileting Goal 1, OT)  goal ongoing  -[facility] Name 12/14/20 1242          Strength Goal 1 (OT)    Strength Goal 1 (OT)  Pt will perform UB strengthening ex using theraband for resistance.  -     Time Frame (Strength Goal 1, OT)  long term goal (LTG);5 days  -     Progress/Outcome (Strength Goal 1, OT)  goal ongoing  -     Row Name 12/14/20 1242          Therapy Assessment/Plan (OT)    Planned Therapy Interventions (OT)  BADL retraining;activity tolerance training;strengthening exercise;transfer/mobility retraining  -       User Key  (r) = Recorded By, (t) = Taken By, (c) = Cosigned By    Initials Name Provider Type    Akilah Del Angel Occupational Therapist        Clinical Impression     Row Name 12/14/20 1240          Pain  Assessment    Additional Documentation  Pain Scale: Numbers Pre/Post-Treatment (Group)  -[facility] Name 12/14/20 1240          Pain Scale: Numbers Pre/Post-Treatment    Pretreatment Pain Rating  5/10  -     Posttreatment Pain Rating  5/10  -     Pain Location  abdomen  -     Pain Intervention(s)  Repositioned;Ambulation/increased activity  -[facility] Name 12/14/20 1240          Plan of Care Review    Plan of Care Reviewed With  patient  -     Progress  no change  -     Outcome Summary  Pt seen for OT evaluation today.  Pt presents with weakness and decreased endurance with self care and functional mobility tasks.  Pt is expected to benefit from skilled OT to improve his strength, endurance and independence with ADL tasks.  -[facility] Name 12/14/20 1240          Therapy Assessment/Plan (OT)    Patient/Family Therapy Goal Statement (OT)  Pt plans to d/c back to [facility]     Rehab Potential (OT)  good, to achieve stated therapy goals  -     Criteria for Skilled Therapeutic Interventions Met (OT)  yes;skilled treatment is necessary  [facility]     Therapy Frequency (OT)  3 times/wk  -AH     Row Name 12/14/20 1240          Therapy Plan Review/Discharge Plan (OT)    Anticipated Discharge Disposition (OT)  home with home health;assisted living  -[facility] Name 12/14/20 1240          Vital Signs    Pre SpO2 (%)  95  -     O2 Delivery Pre Treatment  supplemental O2 4L  -     Intra SpO2 (%)  89  -AH     O2 Delivery Intra Treatment  supplemental O2  -AH     Post SpO2 (%)  92  -AH     O2 Delivery Post Treatment  supplemental O2  -AH     Pre Patient Position  Supine  -     Intra Patient Position  Sitting  -     Post Patient Position  Sitting  -AH     Row Name 12/14/20 1240          Positioning and Restraints    Pre-Treatment Position  in bed  -     Post Treatment Position  chair  -     In Chair  reclined;call light within reach;encouraged to call for assist  -       User Key  (r) = Recorded By, (t) =  Taken By, (c) = Cosigned By    Initials Name Provider Type    Akilah Del Angel Occupational Therapist        Outcome Measures     Row Name 12/14/20 1244          How much help from another is currently needed...    Putting on and taking off regular lower body clothing?  3  -AH     Bathing (including washing, rinsing, and drying)  3  -AH     Toileting (which includes using toilet bed pan or urinal)  3  -AH     Putting on and taking off regular upper body clothing  4  -AH     Taking care of personal grooming (such as brushing teeth)  4  -AH     Eating meals  4  -     AM-PAC 6 Clicks Score (OT)  21  -AH     Row Name 12/14/20 1244          Functional Assessment    Outcome Measure Options  AM-PAC 6 Clicks Daily Activity (OT)  -       User Key  (r) = Recorded By, (t) = Taken By, (c) = Cosigned By    Initials Name Provider Type    Akilah Del Angel Occupational Therapist        Occupational Therapy Education                 Title: PT OT SLP Therapies (In Progress)     Topic: Occupational Therapy (In Progress)     Point: ADL training (Done)     Description:   Instruct learner(s) on proper safety adaptation and remediation techniques during self care or transfers.   Instruct in proper use of assistive devices.              Learning Progress Summary           Patient Acceptance, E,TB, VU by  at 12/14/2020 1245    Comment: Role of OT/POC                   Point: Home exercise program (Not Started)     Description:   Instruct learner(s) on appropriate technique for monitoring, assisting and/or progressing therapeutic exercises/activities.              Learner Progress:  Not documented in this visit.          Point: Precautions (Not Started)     Description:   Instruct learner(s) on prescribed precautions during self-care and functional transfers.              Learner Progress:  Not documented in this visit.          Point: Body mechanics (Not Started)     Description:   Instruct learner(s) on proper positioning and spine  alignment during self-care, functional mobility activities and/or exercises.              Learner Progress:  Not documented in this visit.                      User Key     Initials Effective Dates Name Provider Type Discipline     03/07/18 -  Akilah Gutierrez Occupational Therapist OT              OT Recommendation and Plan  Planned Therapy Interventions (OT): BADL retraining, activity tolerance training, strengthening exercise, transfer/mobility retraining  Therapy Frequency (OT): 3 times/wk  Plan of Care Review  Plan of Care Reviewed With: patient  Progress: no change  Outcome Summary: Pt seen for OT evaluation today.  Pt presents with weakness and decreased endurance with self care and functional mobility tasks.  Pt is expected to benefit from skilled OT to improve his strength, endurance and independence with ADL tasks.     Time Calculation:   Time Calculation- OT     Row Name 12/14/20 1245             Time Calculation- OT    OT Start Time  1025  -      OT Received On  12/14/20  -      OT Goal Re-Cert Due Date  12/24/20  -        User Key  (r) = Recorded By, (t) = Taken By, (c) = Cosigned By    Initials Name Provider Type     Akilah Gutierrez Occupational Therapist        Therapy Charges for Today     Code Description Service Date Service Provider Modifiers Qty    [id number]  OT EVAL LOW COMPLEXITY 3 12/14/2020 Akilah Gutierrez GO 1               Akilah Gutierrez  12/14/2020

## 2020-12-14 NOTE — PROGRESS NOTES
Discharge Planning Assessment  [facility]     Patient Name: Jani Pena  MRN: [id number]  Today's Date: 12/14/2020    Admit Date: 12/13/2020    Discharge Needs Assessment     Row Name 12/14/20 0535       Living Environment    Lives With  alone    Current Living Arrangements  independent/assisted living facility    Primary Care Provided by  self    Provides Primary Care For  no one    Family Caregiver if Needed  -- lives at assisted living    Able to Return to Prior Arrangements  yes       Resource/Environmental Concerns    Resource/Environmental Concerns  none    Transportation Concerns  car, none per pt, [facility] or family can transport       Transition Planning    Patient/Family Anticipates Transition to  other (see comments) back to assisted living    Patient/Family Anticipated Services at Transition  durable medical equipment will continue [employer] for oxygen continuously at 3-4 l    Transportation Anticipated  agency;family or friend will provide       Discharge Needs Assessment    Readmission Within the Last 30 Days  no previous admission in last 30 days    Concerns to be Addressed  no discharge needs identified    Anticipated Changes Related to Illness  none    Equipment Needed After Discharge  none    Provided Post Acute Provider List?  N/A    Provided Post Acute Provider Quality & Resource List?  N/A        Discharge Plan     Row Name 12/14/20 9593       Plan    Plan  pt resting in chair; [facility] and hopes to one day go home to his house; uses [employer] for oxygen 3-4 L continuously; does not have a poa or living will and does not want information on such; stated if needs therapy, they are available at [facility]; discussed meds to beds, call center and card given, no discharge needs at this time        Continued Care and Services - Admitted Since 12/13/2020    Coordination has not been started for this encounter.         Demographic Summary     Row  Name 12/14/20 1453       General Information    Admission Type  inpatient    Arrived From  emergency department    Referral Source  admission list    Reason for Consult  discharge planning    Preferred Language  English     Used During This Interaction  no        Functional Status     Row Name 12/14/20 1453       Functional Status    Usual Activity Tolerance  fair    Current Activity Tolerance  fair    Functional Status Comments  lives at [facility] and will go back on discharge, per pt still have home and plan to return when better       Functional Status, IADL    Medications  assistive person    Meal Preparation  assistive person    Housekeeping  assistive person    Laundry  assistive person    Shopping  assistive person       Mental Status    General Appearance WDL  WDL       Mental Status Summary    Recent Changes in Mental Status/Cognitive Functioning  no changes                Audrey Inman, RN

## 2020-12-14 NOTE — PLAN OF CARE
Goal Outcome Evaluation:  Plan of Care Reviewed With: patient  Progress: no change  Outcome Summary: Pt diet advanced today to GI soft. Pt swallows WDL if awake and alert, sitting upright. Pt being evaluated by GI. Epigastric dilation scheduled for January. Pt wearing 4L NC while awake

## 2020-12-14 NOTE — PROGRESS NOTES
[facility] HOSPITALIST    PROGRESS NOTE    Name:  Jani Pena   Age:  70 y.o.  Sex:  male  :  1950  MRN:  [id number]   Visit Number:  [id number]  Admission Date:  2020  Date Of Service:  20  Primary Care Physician:  Miguel Rojas MD     LOS: 1 day :  Patient Care Team:  Miguel Rojas MD as PCP - General  Miguel Rojas MD as PCP - Family Medicine  Ulysses Bass MD as Cardiologist (Cardiology):    Chief Complaint:      Abdominal Pain    Subjective / Interval History:     70 year old male admitted to hospitalist service this morning for abdominal pain for past several days.  Chart has been reviewed and patient is resting comfortably today in chair on evaluation.  Patient resides at [facility].  Patient still quite tender to touch in right side of abdomen.  Patient found to have UTI, with cultures still pending. Patient states he was having increased trouble breathing yesterday on admission, but is feeling much better today, back to baseline for chronic COPD.     Review of Systems:     General ROS: Patient denies any fevers, chills or loss of consciousness.  Respiratory ROS: Denies cough or shortness of breath.  Cardiovascular ROS: Denies chest pain or palpitations. No history of exertional chest pain.  Gastrointestinal ROS: + abdominal pain. No reports of N/V/D  Neurological ROS: Denies any focal weakness. No loss of consciousness. Denies any numbness.  Dermatological ROS: Denies any redness or pruritis.    Vital Signs:    Temp:  [97.4 °F (36.3 °C)-100.3 °F (37.9 °C)] 98.2 °F (36.8 °C)  Heart Rate:  [] 90  Resp:  [18-26] 18  BP: (115-190)/(67-98) 131/67    Intake and output:    I/O last 3 completed shifts:  In: 50 [IV Piggyback:50]  Out: 600 [Urine:600]  I/O this shift:  In: -   Out: 1200 [Urine:1200]    Physical Examination:    General Appearance:  Alert and cooperative, not in any acute distress, chronically ill gentleman   Head:   Atraumatic and normocephalic, without obvious abnormality.   Eyes:          Conjunctivae and sclerae normal, no Icterus. No pallor. Extraocular movements are within normal limits.   Neck: Supple, trachea midline   Lungs:   Chest shape is normal. Breath sounds heard bilaterally equally.  No crackles or wheezing. Diminished throughout.  No pleural rub or bronchial breathing.   Heart:  Normal S1 and S2, no murmur, no gallop, no rub. No JVD   Abdomen:   Normal bowel sounds, no masses, no organomegaly. Soft, tenderness and guarding to right side of abdomen--upper>lower   Extremities: Moves all extremities, no edema, no cyanosis, no clubbing.   Skin: No bleeding, bruising or rash.   Neurologic: Awake, alert and oriented times 3. Moves all 4 extremities equally.     Laboratory results:    Results from last 7 days   Lab Units 12/14/20 0532 12/13/20 1948   SODIUM mmol/L 140 141   POTASSIUM mmol/L 5.5* 4.9   CHLORIDE mmol/L 101 96*   CO2 mmol/L 28.4 35.8*   BUN mg/dL 50* 44*   CREATININE mg/dL 1.92* 2.26*   CALCIUM mg/dL 9.3 9.7   BILIRUBIN mg/dL 0.6 1.0   ALK PHOS U/L 62 80   ALT (SGPT) U/L 7 8   AST (SGOT) U/L 13 15   GLUCOSE mg/dL 162* 130*     Results from last 7 days   Lab Units 12/14/20 0532 12/13/20 1948   WBC 10*3/mm3 15.76* 18.89*   HEMOGLOBIN g/dL 11.8* 13.2   HEMATOCRIT % 37.8 42.9   PLATELETS 10*3/mm3 170 191         Results from last 7 days   Lab Units 12/13/20 1948   TROPONIN T ng/mL 0.040*     Results from last 7 days   Lab Units 12/13/20 2137   URINECX  Growth present, too young to evaluate           I have reviewed the patient's laboratory results.    Radiology results:    Imaging Results (Last 24 Hours)     Procedure Component Value Units Date/Time    US Gallbladder [[id number]] Collected: 12/14/20 1332     Updated: 12/14/20 1335    Narrative:      PROCEDURE: US GALLBLADDER-     HISTORY: RUQ pain; A41.9-Sepsis, unspecified organism; N39.0-Urinary  tract infection, site not specified; N28.9-Disorder of  kidney and  ureter, unspecified     FINDINGS: An 8 mm stone is noted within the noted within the neck of the  gallbladder. No gallbladder wall thickening or distention is present. No  fluid collections are seen.     There is no biliary ductal dilatation. No free fluid is seen. Limited  portions of the right kidney and right liver are unremarkable.       Impression:      Cholelithiasis. No associated biliary obstruction.        This report was finalized on 12/14/2020 1:33 PM by Terrance Cheema MD.    CT Abdomen Pelvis Without Contrast [[id number]] Collected: 12/13/20 2159     Updated: 12/13/20 2200    Narrative:      FINAL REPORT    CLINICAL HISTORY:  abdominal pain, fever    FINDINGS:  Multiple contiguous transaxial slices through the abdomen and  pelvis were obtained without the intravenous ministration of  contrast with coronal reformatted images.  There is bilateral  atelectasis versus scarring.  The liver is heterogeneous.  There  is a small gallstone without gallbladder wall thickening.  The  pancreas, spleen and adrenal glands are unremarkable.  Right  renal vascular calcification without evidence for obstructive  uropathy.  Nonspecific bilateral perinephric fat stranding.  The  bowel gas pattern is nonobstructive.  There is diverticulosis  without diverticulitis.  No evidence for appendicitis.  Moderate  stool is present.  The bladder wall is thickened, nonspecific.  There is streak artifact from left hip prosthesis.  Aorta and  iliac arteries are calcified.      Impression:      Nonspecific bladder wall thickening, cannot exclude cystitis  diverticulosis without diverticulitis  cholelithiasis    Authenticated by Geovany Mullins MD on 12/13/2020 09:59:14 PM    XR Chest 1 View [[id number]] Collected: 12/13/20 2157     Updated: 12/13/20 2157    Narrative:      FINAL REPORT    CLINICAL HISTORY:  soa    FINDINGS:  Interstitial prominence is consistent with chronic lung disease.  There is nonspecific bilateral lower  lobe airspace disease.  The  heart is enlarged.  Aortic knob is calcified.  Pacemaker and  neural stimulators are present.      Impression:      Chronic changes with bilateral airspace disease    Authenticated by Geovany Mullins MD on 12/13/2020 09:57:00 PM          I have reviewed the patient's radiology reports.    Medication Review:     I have reviewed the patient's active and prn medications.     Problem List:      Atrial fibrillation (CMS/HCC)    Chronic kidney disease    Degeneration of cervical intervertebral disc    Gastroesophageal reflux disease    Diverticulosis    Chronic respiratory failure with hypoxia and hypercapnia (CMS/HCC)    Sepsis secondary to UTI (CMS/HCC)      Assessment:    Atrial fibrillation (CMS/HCC)    Chronic kidney disease    Degeneration of cervical intervertebral disc    Gastroesophageal reflux disease    Diverticulosis    Chronic respiratory failure with hypoxia and hypercapnia (CMS/HCC)    Sepsis secondary to UTI (CMS/HCC)    Plan:    Continue Rocephin antibiotic therapy until cultures return.  Adjust based on cultures.  Continue to monitor kidney function which is mildly improved today to 1.92 down from 2.26.  WBC trending down today to 15.76 down from 18.89 on admission.  Continue bowel management.  Lovenox for DVT prophylaxis.  US of Gallbladder ordered today to rule out cholecystitis and was negative.  Will continue supportive care while admitted to the hospital for GERD, SSS/ A-Fib, chronic pain with DDD, and insomnia.  Will continue oxygen and respiratory treatments as ordered.  Patient passed bedside nursing swallow screen.  GI Soft Diet ordered for the patient.  Will continue to work with PT/OT while admitted.   Will continue to monitor lab work, vital signs, and pain.    Anna Piña, APRN  12/14/20  14:09 EST    Dictated utilizing Dragon dictation.

## 2020-12-14 NOTE — PLAN OF CARE
Goal Outcome Evaluation:      New admit from ER. Patient with severe COPD admitted for sepsis with UTI. GI consult placed due to dysphagia along with RUQ pain. Patient swallowed medications with sips of water at hs without noted difficulty. Esophagus was last dilated 10/2020 by Dr Madrid. 4LNC during the day. RT placed BIPAP for overnight use as per home regimen. IVF and IV atbx.

## 2020-12-14 NOTE — THERAPY EVALUATION
Patient Name: Jani Pena  : 1950    MRN: [id number]                              Today's Date: 2020       Admit Date: 2020    Visit Dx:     ICD-10-CM ICD-9-CM   1. Sepsis secondary to UTI (CMS/HCC)  A41.9 038.9    N39.0 995.91     599.0   2. Acute kidney insufficiency  N28.9 593.9     Patient Active Problem List   Diagnosis   • Anxiety   • Atrial fibrillation (CMS/HCC)   • Chronic kidney disease   • Steroid-dependent COPD (CMS/HCC)   • Degeneration of cervical intervertebral disc   • Gastroesophageal reflux disease   • Diverticulosis   • Hemorrhoids   • Hiatal hernia   • Hyperlipidemia   • Kyphosis, acquired   • PEDRO on CPAP   • Benign prostatic hyperplasia with incomplete bladder emptying   • Depression   • History of ventricular septal defect   • Chronic diastolic congestive heart failure (CMS/HCC)   • Coronary artery disease involving native coronary artery of native heart with angina pectoris (CMS/HCC)   • Lumbar radiculopathy   • Anemia   • Cardiac pacemaker in situ   • Sick sinus syndrome (CMS/HCC)   • Chronic respiratory failure with hypoxia and hypercapnia (CMS/HCC)   • Hematoma   • Flexural eczema   • Xerosis of skin   • Abnormal urinalysis   • Esophageal dysphagia   • Fall   • Presbyesophagus   • Sepsis secondary to UTI (CMS/HCC)     Past Medical History:   Diagnosis Date   • Abnormal liver enzymes    • Anemia    • Anxiety    • Arthritis    • Atherosclerotic heart disease of native coronary artery without angina pectoris    • Atrial fibrillation (CMS/HCC)    • Back pain    • BPH (benign prostatic hyperplasia)    • Cataract, bilateral    • Chest pain     2-3 MONTHS AGO.  PER PT, HAS ADDRESSED WITH CARDIOLOGIST.  STATES HAS NTG PRN.   • CHF (congestive heart failure) (CMS/HCC)    • Chronic bronchitis (CMS/HCC)    • Chronic kidney disease    • COPD (chronic obstructive pulmonary disease) (CMS/HCC)    • Degeneration of cervical intervertebral disc    • Depression    • Diverticulosis     • Dyspnea    • Esophageal reflux    • Esophagitis    • Gastritis    • Hematuria    • Hemorrhoids    • Hiatal hernia    • History of arthritis    • History of nuclear stress test     UNSURE OF DATE   • History of peripheral neuropathy    • History of ventricular septal defect    • History of ventricular septal defect    • Hyperlipidemia    • Hypertension    • Impaired functional mobility, balance, gait, and endurance    • Infectious diarrhea    • Kyphosis, acquired    • Lumbar radiculopathy    • Mitral and aortic valve disease    • Nephrolithiasis    • Phimosis    • Problems with swallowing     WITH FOOD   • Respiratory failure (CMS/HCC)    • Sleep apnea     BIPAP, too awkward to bring   • TIA (transient ischemic attack)     X3.   2014   • Ventral hernia    • Wears glasses     FOR READING     Past Surgical History:   Procedure Laterality Date   • CARDIAC CATHETERIZATION     • CARDIAC CATHETERIZATION N/A 5/24/2018    Procedure: Left Heart Cath;  Surgeon: Edouard Robertson MD;  Location:  [facility];  Service: Cardiovascular   • CARDIAC ELECTROPHYSIOLOGY PROCEDURE N/A 1/31/2019    Procedure: PACEMAKER IMPLANTATION- DC;  Surgeon: Omar Encinas DO;  Location:  [facility];  Service: Cardiology   • COLONOSCOPY N/A 12/28/2019    Procedure: COLONOSCOPY WITH HOT POLYPECTOMY;  Surgeon: Celio Maldonado MD;  Location:  [facility];  Service: Gastroenterology   • ENDOSCOPY N/A 1/26/2019    Procedure: ESOPHAGOGASTRODUODENOSCOPY;  Surgeon: Brunner, Mark I, MD;  Location:  [facility];  Service: Gastroenterology   • ENDOSCOPY N/A 12/27/2019    Procedure: ESOPHAGOGASTRODUODENOSCOPY;  Surgeon: Valdemar Mcdonald MD;  Location: [facility] ENDOSCOPY;  Service: General   • ENDOSCOPY N/A 10/20/2020    Procedure: ESOPHAGOGASTRODUODENOSCOPY WITH DILATION AND COLD FORCEP BIOPSY;  Surgeon: Cale Madrid MD;  Location: [facility] ENDOSCOPY;  Service: Gastroenterology;  Laterality: N/A;   • HERNIA  REPAIR     • ORTHOPEDIC SURGERY Left     Left hip arthroplasty   • PACEMAKER IMPLANTATION  01/31/2019   • SUPRAPUBIC CATHETER INSERTION      History of Percutaneous Catheter Placement Into Ureter   • THROAT SURGERY      FOR SLEEP APNEA   • VSD REPAIR      History of VSD Repair By Patch Closure     General Information     Row Name 12/14/20 1022          Physical Therapy Time and Intention    Document Type  evaluation  -     Mode of Treatment  physical therapy  -     Row Name 12/14/20 1022          General Information    Patient Profile Reviewed  yes  -JR     Prior Level of Function  independent:;community mobility  -     Existing Precautions/Restrictions  fall;oxygen therapy device and L/min  -     Barriers to Rehab  medically complex  -     Row Name 12/14/20 1022          Living Environment    Lives With  facility resident has his own home, but has been at [facility] for almost a year  -     Row Name 12/14/20 1022          Home Main Entrance    Number of Stairs, Main Entrance  none  -     Row Name 12/14/20 1022          Stairs Within Home, Primary    Number of Stairs, Within Home, Primary  none  -     Row Name 12/14/20 1022          Cognition    Orientation Status (Cognition)  oriented x 4  -     Row Name 12/14/20 1022          Safety Issues, Functional Mobility    Safety Issues Affecting Function (Mobility)  safety precautions follow-through/compliance;insight into deficits/self-awareness  -     Impairments Affecting Function (Mobility)  shortness of breath;endurance/activity tolerance;motor planning;strength;balance  -       User Key  (r) = Recorded By, (t) = Taken By, (c) = Cosigned By    Initials Name Provider Type    JR Kaylee Kurtz, PT Physical Therapist        Mobility     Row Name 12/14/20 1022          Bed Mobility    Bed Mobility  supine-sit  -JR     Supine-Sit Selmer (Bed Mobility)  contact guard  -     Assistive Device (Bed Mobility)  head of bed elevated  -     Row Name  12/14/20 1022          Bed-Chair Transfer    Bed-Chair Anderson (Transfers)  contact guard  -JR     Assistive Device (Bed-Chair Transfers)  -- HHA and gait--usually uses RW  -JR     Row Name 12/14/20 1022          Sit-Stand Transfer    Sit-Stand Anderson (Transfers)  contact guard  -JR     Assistive Device (Sit-Stand Transfers)  -- HHA and gait belt--usually uses RW  -JR     Row Name 12/14/20 1022          Gait/Stairs (Locomotion)    Anderson Level (Gait)  contact guard  -JR     Assistive Device (Gait)  -- HHA and gait belt--usually uses RW  -JR     Distance in Feet (Gait)  12  -JR     Deviations/Abnormal Patterns (Gait)  base of support, wide;gait speed decreased;stride length decreased  -JR     Bilateral Gait Deviations  forward flexed posture;heel strike decreased  -JR       User Key  (r) = Recorded By, (t) = Taken By, (c) = Cosigned By    Initials Name Provider Type    Kaylee Lemos, PT Physical Therapist        Obj/Interventions     Row Name 12/14/20 1022          Range of Motion Comprehensive    General Range of Motion  bilateral lower extremity ROM WFL  -     Row Name 12/14/20 1022          Strength Comprehensive (MMT)    Comment, General Manual Muscle Testing (MMT) Assessment  BLE=4/5  -     Row Name 12/14/20 1022          Motor Skills    Motor Skills  functional endurance  -     Functional Endurance  Limited as seen by fatigue after 12 feet of gait  -     Row Name 12/14/20 1022          Balance    Balance Assessment  sitting static balance;sitting dynamic balance;standing static balance;standing dynamic balance  -     Static Sitting Balance  WFL  -JR     Dynamic Sitting Balance  WFL  -JR     Static Standing Balance  WFL  -JR     Dynamic Standing Balance  mild impairment  -JR     Balance Interventions  sit to stand;standing;supported;static;dynamic  -       User Key  (r) = Recorded By, (t) = Taken By, (c) = Cosigned By    Initials Name Provider Type    Kaylee Lemos, PT Physical  Therapist        Goals/Plan     Row Name 12/14/20 1022          Bed Mobility Goal 1 (PT)    Activity/Assistive Device (Bed Mobility Goal 1, PT)  bed mobility activities, all  -JR     Perry Level/Cues Needed (Bed Mobility Goal 1, PT)  modified independence  -JR     Time Frame (Bed Mobility Goal 1, PT)  short term goal (STG)  -JR     Progress/Outcomes (Bed Mobility Goal 1, PT)  goal ongoing  -JR     Row Name 12/14/20 1022          Transfer Goal 1 (PT)    Activity/Assistive Device (Transfer Goal 1, PT)  sit-to-stand/stand-to-sit;bed-to-chair/chair-to-bed  -JR     Perry Level/Cues Needed (Transfer Goal 1, PT)  modified independence  -JR     Time Frame (Transfer Goal 1, PT)  short term goal (STG)  -JR     Progress/Outcome (Transfer Goal 1, PT)  goal ongoing  -JR     Row Name 12/14/20 1022          Gait Training Goal 1 (PT)    Activity/Assistive Device (Gait Training Goal 1, PT)  gait (walking locomotion);walker, rolling  -JR     Perry Level (Gait Training Goal 1, PT)  modified independence  -JR     Distance (Gait Training Goal 1, PT)  300  -JR     Time Frame (Gait Training Goal 1, PT)  long term goal (LTG)  -JR     Strategies/Barriers (Gait Training Goal 1, PT)  with minimal shortness of breath and oxygen saturations at or above 90%  -JR     Progress/Outcome (Gait Training Goal 1, PT)  goal ongoing  -JR       User Key  (r) = Recorded By, (t) = Taken By, (c) = Cosigned By    Initials Name Provider Type    JR Kaylee Kurtz, PT Physical Therapist        Clinical Impression     Row Name 12/14/20 1022          Pain    Additional Documentation  Pain Scale: Numbers Pre/Post-Treatment (Group)  -     Row Name 12/14/20 1022          Pain Scale: Numbers Pre/Post-Treatment    Pretreatment Pain Rating  5/10  -JR     Posttreatment Pain Rating  5/10  -JR     Pain Location  abdomen  -JR     Pain Intervention(s)  Ambulation/increased activity;Repositioned  -     Row Name 12/14/20 1022          Plan of Care Review     Plan of Care Reviewed With  patient  -JR     Outcome Summary  Patient participates well in skilled PT and demonstrates decreased activity tolerance, balance and strength with resulting difficulty performing mobility tasks.  He is expected to benefit from additional PT services while hospitalized and upon discharge to [facility].  -JR     Row Name 12/14/20 1022          Therapy Assessment/Plan (PT)    Patient/Family Therapy Goals Statement (PT)  Patient is eager to get back to [facility] and back to walking for himself  -JR     Rehab Potential (PT)  good, to achieve stated therapy goals  -JR     Criteria for Skilled Interventions Met (PT)  yes;meets criteria;skilled treatment is necessary  -JR     Row Name 12/14/20 1022          Positioning and Restraints    Pre-Treatment Position  in bed  -JR     Post Treatment Position  chair  -JR     In Chair  reclined;call light within reach;encouraged to call for assist  -JR       User Key  (r) = Recorded By, (t) = Taken By, (c) = Cosigned By    Initials Name Provider Type    Kaylee Lemos, PT Physical Therapist        Outcome Measures     Row Name 12/14/20 1022          How much help from another person do you currently need...    Turning from your back to your side while in flat bed without using bedrails?  3  -JR     Moving from lying on back to sitting on the side of a flat bed without bedrails?  3  -JR     Moving to and from a bed to a chair (including a wheelchair)?  3  -JR     Standing up from a chair using your arms (e.g., wheelchair, bedside chair)?  3  -JR     Climbing 3-5 steps with a railing?  2  -JR     To walk in hospital room?  3  -JR     AM-PAC 6 Clicks Score (PT)  17  -JR     Row Name 12/14/20 1022          Functional Assessment    Outcome Measure Options  AM-PAC 6 Clicks Basic Mobility (PT)  -JR       User Key  (r) = Recorded By, (t) = Taken By, (c) = Cosigned By    Initials Name Provider Type    Kaylee Lemos, PT Physical Therapist         Physical Therapy Education                 Title: PT OT SLP Therapies (In Progress)     Topic: Physical Therapy (In Progress)     Point: Mobility training (Done)     Learning Progress Summary           Patient Acceptance, E,TB, VU by  at 12/14/2020 1132    Comment: Role of PT per POC                   Point: Home exercise program (Not Started)     Learner Progress:  Not documented in this visit.          Point: Body mechanics (Not Started)     Learner Progress:  Not documented in this visit.          Point: Precautions (Not Started)     Learner Progress:  Not documented in this visit.                      User Key     Initials Effective Dates Name Provider Type Discipline     04/03/18 -  Kaylee Kurtz PT Physical Therapist PT              PT Recommendation and Plan  Planned Therapy Interventions (PT): strengthening, balance training, bed mobility training, transfer training, gait training, home exercise program, patient/family education  Plan of Care Reviewed With: patient  Outcome Summary: Patient participates well in skilled PT and demonstrates decreased activity tolerance, balance and strength with resulting difficulty performing mobility tasks.  He is expected to benefit from additional PT services while hospitalized and upon discharge to [facility].     Time Calculation:   PT Charges     Row Name 12/14/20 1133             Time Calculation    Start Time  1022  -      PT Received On  12/14/20  -      PT Goal Re-Cert Due Date  12/24/20  -        User Key  (r) = Recorded By, (t) = Taken By, (c) = Cosigned By    Initials Name Provider Type    Kaylee Lemos PT Physical Therapist        Therapy Charges for Today     Code Description Service Date Service Provider Modifiers Qty    [id number] HC PT EVAL LOW COMPLEXITY 3 12/14/2020 Kaylee Kurtz PT GP 1          PT G-Codes  Outcome Measure Options: AM-PAC 6 Clicks Basic Mobility (PT)  AM-PAC 6 Clicks Score (PT): 17    Kaylee Kurtz  PT  12/14/2020

## 2020-12-14 NOTE — PLAN OF CARE
Goal Outcome Evaluation:  Plan of Care Reviewed With: patient  Progress: no change  Outcome Summary: Pt seen for OT evaluation today.  Pt presents with weakness and decreased endurance with self care and functional mobility tasks.  Pt is expected to benefit from skilled OT to improve his strength, endurance and independence with ADL tasks.

## 2020-12-14 NOTE — PROGRESS NOTES
"Adult Nutrition  Assessment/PES    Patient Name:  Jani Pena  YOB: 1950  MRN: [id number]  Admit Date:  12/13/2020    Assessment Date:  12/14/2020    Comments:    Recommend:  1. Continue NPO diet order as medically appropriate. Consider advancing diet with cardiac, renal, and carb consistent modifications once appropriate.  2. Establish and encourage PO intake once diet is advanced.  3. Consider a renal multivitamin with minerals daily.     RD to follow pt and available PRN.      Reason for Assessment     Row Name 12/14/20 1318          Reason for Assessment    Reason For Assessment  per organizational policy;diagnosis/disease state     Diagnosis  cardiac disease;diabetes diagnosis/complications;infection/sepsis;renal disease;other (see comments);gastrointestinal disease Sepsis, UTI, ARTIS, COPD, GERD, CKD, AFIB, diverticulosis, BPH, CHF, HTN, HLD,           Anthropometrics     Row Name 12/14/20 1321          Anthropometrics    Height  172.7 cm (68\")        Ideal Body Weight (IBW)    Ideal Body Weight (IBW) (kg)  70.89         Labs/Tests/Procedures/Meds     Row Name 12/14/20 1319          Labs/Procedures/Meds    Lab Results Reviewed  reviewed, pertinent     Lab Results Comments  High: BUN, K, Gluc, Cr        Medications    Pertinent Medications Reviewed  reviewed, pertinent     Pertinent Medications Comments  Lipitor, ferrous sulfate, Lasix, Remeron, Protonix, Miralax           Estimated/Assessed Needs     Row Name 12/14/20 1321          Calculation Measurements    Weight Used For Calculations  78 kg (171 lb 15.3 oz) Actual BW     Height  172.7 cm (68\")        Estimated/Assessed Needs    Additional Documentation  Fluid Requirements (Group);[facility] Jeor Equation (Group);Protein Requirements (Group);Calorie Requirements (Group)        Calorie Requirements    Estimated Calorie Need Method  [facility] Jeor     Estimated Calorie Requirement Comment  [id number]        [facility]. Jeor Equation    " RMR ([place]-St. Jeor Equation)  1514.5     [place]-St. Jeor Activity Factors  -- 1.3 AF        Protein Requirements    Weight Used For Protein Calculations  78 kg (171 lb 15.3 oz) actual BW     Est Protein Requirement Amount (gms/kg)  0.8 gm protein 46-62 gram     Estimated Protein Requirements (gms/day)  62.4        Fluid Requirements    Estimated Fluid Requirement Method  Mickey-Segar Formula     [place]-Segar Method (over 20 kg)  3060         Nutrition Prescription Ordered     Row Name 12/14/20 1322          Nutrition Prescription PO    Current PO Diet  NPO         Evaluation of Received Nutrient/Fluid Intake     Row Name 12/14/20 1321          PO Evaluation    Number of Days PO Intake Evaluated  Insufficient Data               Problem/Interventions:  Problem 1     Row Name 12/14/20 1323          Nutrition Diagnoses Problem 1    Problem 1  Inadequate Nutrient Intake     Etiology (related to)  MNT for Treatment/Condition     Signs/Symptoms (evidenced by)  NPO         Problem 2     Row Name 12/14/20 1323          Nutrition Diagnoses Problem 2    Problem 2  Altered Nutrition Related to Labs     Etiology (related to)  Medical Diagnosis     Endocrine  DM     Renal  CKD;ARTIS     Signs/Symptoms (evidenced by)  Biochemical     Specific Labs Noted  Glucose;BUN;Creatinine;K+             Intervention Goal     Row Name 12/14/20 1323          Intervention Goal    General  Meet nutritional needs for age/condition;Improved nutrition related lab(s)     PO  Meet estimated needs;Advance diet;Establish PO     Weight  Maintain weight         Nutrition Intervention     Row Name 12/14/20 1323          Nutrition Intervention    RD/Tech Action  Follow Tx progress;Recommend/ordered     Recommended/Ordered  Diet         Nutrition Prescription     Row Name 12/14/20 1323          Nutrition Prescription PO    PO Prescription  Begin/change diet     Common Modifiers  Cardiac;Consistent Carbohydrate;Renal     New PO Prescription Ordered?   No, recommended        Other Orders    Obtain Weight  Daily     Obtain Weight Ordered?  No, recommended     Supplement  Vitamin mineral supplement Renal     Supplement Ordered?  No, recommended         Education/Evaluation     Row Name 12/14/20 0794          Education    Education  Education not appropriate at this time     Please explain  Other (comment) D/C to LTC        Monitor/Evaluation    Monitor  Per protocol;I&O;Pertinent labs;Weight;Skin status           Electronically signed by:  Rachel Simmons RD  12/14/20 13:24 EST

## 2020-12-14 NOTE — ED NOTES
Fourth floor nurse called at this time to get report. Call transferred to VERNON Morales.      Cely Reid  12/13/20 8388

## 2020-12-14 NOTE — CONSULTS
In Patient Consult      Date of Consultation: 2020  Patient Name: Jani Pena  MRN: [id number]  : 1950     Referring provider: Ferdinand Greer MD    Primary care provider:  Miguel Rojas MD    Reason for consultation: Upper abdominal pain    History of Present Illness: This is a 70-year-old male patient with a prior history of COPD with chronic hypoxia, hypertension, hyperlipidemia, chronic atrial fibrillation, chronic kidney disease, chronic dysphagia, history of benign prostatic hypertrophy was sent to emergency room from [facility] on 2020 for abdominal pain mostly upper and mid abdomen for a few days duration.  He states that he has been having recent issues with a significant constipation despite using multiple laxatives.  With that he developed significant abdominal pain mainly on the right side abdomen and the upper abdomen.  He denied any nausea vomiting.  He also felt feverish at home before admission.  He states that after admission he had a multiple bowel movements and he is feeling better however his pain continued to persist in the right side abdomen.  He did not see any blood in the stool.  He was on a Plavix at home    He states that since he had an EGD and empiric esophageal dilatation in 2020 his swallowing is significantly improved.  His EGD was largely unremarkable except mild reactive gastropathy.  Random esophageal biopsies were normal without any signs of EOE.  He had a colonoscopy done by Dr. Boone in 2019 which was reported as normal except diverticulosis.     His imaging studies done in the ED revealed possible cystitis, colonic diverticulosis without diverticulitis colonic fecal loading.  CT imaging and also subsequent ultrasound showed gallstones without any signs of cholecystitis..  GI was consulted today for further assist with the evaluation of his abdominal pain          Subjective     Past  Medical History:   Diagnosis Date   • Abnormal liver enzymes    • Anemia    • Anxiety    • Arthritis    • Atherosclerotic heart disease of native coronary artery without angina pectoris    • Atrial fibrillation (CMS/HCC)    • Back pain    • BPH (benign prostatic hyperplasia)    • Cataract, bilateral    • Chest pain     2-3 MONTHS AGO.  PER PT, HAS ADDRESSED WITH CARDIOLOGIST.  STATES HAS NTG PRN.   • CHF (congestive heart failure) (CMS/HCC)    • Chronic bronchitis (CMS/HCC)    • Chronic kidney disease    • COPD (chronic obstructive pulmonary disease) (CMS/HCC)    • Degeneration of cervical intervertebral disc    • Depression    • Diverticulosis    • Dyspnea    • Esophageal reflux    • Esophagitis    • Gastritis    • Hematuria    • Hemorrhoids    • Hiatal hernia    • History of arthritis    • History of nuclear stress test     UNSURE OF DATE   • History of peripheral neuropathy    • History of ventricular septal defect    • History of ventricular septal defect    • Hyperlipidemia    • Hypertension    • Impaired functional mobility, balance, gait, and endurance    • Infectious diarrhea    • Kyphosis, acquired    • Lumbar radiculopathy    • Mitral and aortic valve disease    • Nephrolithiasis    • Phimosis    • Problems with swallowing     WITH FOOD   • Respiratory failure (CMS/HCC)    • Sleep apnea     BIPAP, too awkward to bring   • TIA (transient ischemic attack)     X3.   2014   • Ventral hernia    • Wears glasses     FOR READING       Past Surgical History:   Procedure Laterality Date   • CARDIAC CATHETERIZATION     • CARDIAC CATHETERIZATION N/A 5/24/2018    Procedure: Left Heart Cath;  Surgeon: Edouard Robertson MD;  Location:  [facility] CATH INVASIVE LOCATION;  Service: Cardiovascular   • CARDIAC ELECTROPHYSIOLOGY PROCEDURE N/A 1/31/2019    Procedure: PACEMAKER IMPLANTATION- DC;  Surgeon: Omar Encinas DO;  Location:  [facility] INV[facility]IVE LOCATION;  Service: Cardiology   • COLONOSCOPY N/A 12/28/2019    Procedure:  COLONOSCOPY WITH HOT POLYPECTOMY;  Surgeon: Celio Maldonado MD;  Location: [facility] ENDOSCOPY;  Service: Gastroenterology   • ENDOSCOPY N/A 1/26/2019    Procedure: ESOPHAGOGASTRODUODENOSCOPY;  Surgeon: Brunner, Mark I, MD;  Location: [facility] ENDOSCOPY;  Service: Gastroenterology   • ENDOSCOPY N/A 12/27/2019    Procedure: ESOPHAGOGASTRODUODENOSCOPY;  Surgeon: Valdemar Mcdonald MD;  Location: [facility] ENDOSCOPY;  Service: General   • ENDOSCOPY N/A 10/20/2020    Procedure: ESOPHAGOGASTRODUODENOSCOPY WITH DILATION AND COLD FORCEP BIOPSY;  Surgeon: Cale Madrid MD;  Location: [facility] ENDOSCOPY;  Service: Gastroenterology;  Laterality: N/A;   • HERNIA REPAIR     • ORTHOPEDIC SURGERY Left     Left hip arthroplasty   • PACEMAKER IMPLANTATION  01/31/2019   • SUPRAPUBIC CATHETER INSERTION      History of Percutaneous Catheter Placement Into Ureter   • THROAT SURGERY      FOR SLEEP APNEA   • VSD REPAIR      History of VSD Repair By Patch Closure       Family History   Problem Relation Age of Onset   • Other Father         CABG   • Coronary artery disease Father    • Colon cancer Neg Hx        Social History     Socioeconomic History   • Marital status:      Spouse name: Not on file   • Number of children: Not on file   • Years of education: Not on file   • Highest education level: Not on file   Occupational History     Employer: RETIRED   Tobacco Use   • Smoking status: Former Smoker     Packs/day: 3.00     Years: 30.00     Pack years: 90.00     Types: Cigarettes     Quit date: 2017     Years since quitting: 3.9   • Smokeless tobacco: Never Used   Substance and Sexual Activity   • Alcohol use: No   • Drug use: No   • Sexual activity: Defer         Current Facility-Administered Medications:   •  acetaminophen (TYLENOL) tablet 500 mg, 500 mg, Oral, Q6H PRN, Channing, Lio Foster, DO, 500 mg at 12/14/20 1254  •  amLODIPine (NORVASC) tablet 5 mg, 5 mg, Oral, Daily, Channing, Lio Foster, DO, 5 mg at 12/14/20 0850  •   aspirin chewable tablet 81 mg, 81 mg, Oral, Daily, Lio Snell, DO  •  atorvastatin (LIPITOR) tablet 10 mg, 10 mg, Oral, Q PM, Lio Snell, DO  •  benzonatate (TESSALON) capsule 200 mg, 200 mg, Oral, TID PRN, Lio Snell, DO  •  budesonide-formoterol (SYMBICORT) 160-4.5 MCG/ACT inhaler 2 puff, 2 puff, Inhalation, BID - RT **AND** ipratropium (ATROVENT) nebulizer solution 0.5 mg, 0.5 mg, Nebulization, Q6H - RT, Lio Snell, DO, 0.5 mg at 12/14/20 1856  •  cefTRIAXone (ROCEPHIN) IVPB 1 g/50ml dextrose (premix), 1 g, Intravenous, Q24H, Lio Snell, DO  •  clopidogrel (PLAVIX) tablet 75 mg, 75 mg, Oral, Daily, Lio Snell, DO, 75 mg at 12/14/20 0850  •  cyclobenzaprine (FLEXERIL) tablet 5 mg, 5 mg, Oral, TID PRN, Lio Snell, DO  •  docusate sodium (COLACE) capsule 100 mg, 100 mg, Oral, BID PRN, Lio Snell, DO  •  enoxaparin (LOVENOX) syringe 40 mg, 40 mg, Subcutaneous, Q24H, Lio Snell, DO, 40 mg at 12/14/20 0623  •  escitalopram (LEXAPRO) tablet 20 mg, 20 mg, Oral, Daily, Lio Snell, DO, 20 mg at 12/14/20 0850  •  ferrous sulfate EC tablet 324 mg, 324 mg, Oral, Daily With Breakfast, Lio Snell, DO  •  finasteride (PROSCAR) tablet 5 mg, 5 mg, Oral, Daily, Channing, Lio Hutchison, DO, 5 mg at 12/14/20 0850  •  furosemide (LASIX) tablet 20 mg, 20 mg, Oral, Daily, Lio Snell, DO, 20 mg at 12/14/20 0850  •  HYDROcodone-acetaminophen (NORCO) 5-325 MG per tablet 1 tablet, 1 tablet, Oral, Q8H PRN, Lio Snell DO, 1 tablet at 12/14/20 0137  •  magnesium hydroxide (MILK OF MAGNESIA) suspension 2400 mg/10mL 10 mL, 10 mL, Oral, Daily PRN, Lio Snell DO  •  melatonin tablet 5 mg, 5 mg, Oral, Nightly, Lio Snell, DO, 5 mg at 12/14/20 0137  •  mirtazapine (REMERON) tablet 15 mg, 15 mg, Oral, Nightly, Lio Snell, DO, 15 mg at 12/14/20 0137  •  ondansetron (ZOFRAN) injection 4 mg, 4 mg, Intravenous, Q6H PRN, Channing,  Lio Foster, DO  •  pantoprazole (PROTONIX) EC tablet 40 mg, 40 mg, Oral, Daily, Channing, Lio Foster, DO, 40 mg at 12/14/20 0624  •  polyethylene glycol (MIRALAX) packet 17 g, 17 g, Oral, Daily, Channing, Lio Foster, DO  •  sodium chloride 0.9 % flush 10 mL, 10 mL, Intravenous, PRN, Bigg Campos MD  •  sodium chloride 0.9 % flush 10 mL, 10 mL, Intravenous, Q12H, Channing, Lio Foster, DO, 10 mL at 12/14/20 0852  •  sodium chloride 0.9 % flush 10 mL, 10 mL, Intravenous, PRN, Channing, Lio Foster, DO  •  tamsulosin (FLOMAX) 24 hr capsule 0.4 mg, 0.4 mg, Oral, Nightly, Channing, Lio Foster, DO, 0.4 mg at 12/14/20 0137  •  traZODone (DESYREL) tablet 50 mg, 50 mg, Oral, Nightly, Channing, Lio Foster, DO, 50 mg at 12/14/20 0137    Allergies   Allergen Reactions   • Levaquin [Levofloxacin] Hives   • Sulfa Antibiotics Nausea And Vomiting       Review of Systems   Constitutional: Negative for appetite change, fatigue, fever and unexpected weight loss.   Respiratory: Positive for shortness of breath. Negative for cough and wheezing.    Cardiovascular: Negative for chest pain, palpitations and leg swelling.   Gastrointestinal: Positive for abdominal pain and constipation. Negative for abdominal distention, anal bleeding, blood in stool, diarrhea, nausea, rectal pain, vomiting, GERD and indigestion.   Genitourinary: Positive for dysuria and frequency. Negative for hematuria.   Musculoskeletal: Negative for back pain and joint swelling.   Skin: Negative for color change, rash and skin lesions.   Neurological: Positive for weakness (Generalized weakness). Negative for dizziness, syncope, speech difficulty, headache and memory problem.   Hematological: Negative for adenopathy. Bruises/bleeds easily.   Psychiatric/Behavioral: Negative for agitation, behavioral problems, suicidal ideas and depressed mood.        The following portions of the patient's history were reviewed and updated as appropriate: allergies, current medications, past  "family history, past medical history, past social history, past surgical history and problem list.    Objective     Vitals:    12/14/20 1131 12/14/20 1237 12/14/20 1321 12/14/20 1511   BP: 131/67   138/74   BP Location: Left arm   Left arm   Patient Position: Sitting   Lying   Pulse:  90     Resp: 18 18  16   Temp: 98.2 °F (36.8 °C)   98.2 °F (36.8 °C)   TempSrc: Oral   Oral   SpO2: 97%   98%   Weight:       Height:   172.7 cm (68\")        Physical Exam  Vitals signs and nursing note reviewed.   Constitutional:       Appearance: He is well-developed.      Comments: He is in a short of breath and tachypneic   HENT:      Head: Normocephalic and atraumatic.      Right Ear: External ear normal.      Left Ear: External ear normal.   Eyes:      Conjunctiva/sclera: Conjunctivae normal.   Neck:      Musculoskeletal: Normal range of motion and neck supple.      Thyroid: No thyromegaly.      Trachea: No tracheal deviation.   Cardiovascular:      Rate and Rhythm: Normal rate and regular rhythm.      Heart sounds: No murmur.   Pulmonary:      Effort: Pulmonary effort is normal. No respiratory distress.      Breath sounds: Normal breath sounds.   Abdominal:      General: Bowel sounds are normal. There is distension (Mild gaseous distention noted).      Palpations: Abdomen is soft. There is no mass.      Tenderness: There is abdominal tenderness (Tender epigastric right side upper and lower abdomen).      Hernia: No hernia is present.      Comments: Bruise noted in the right lower abdomen and the left side abdomen   Musculoskeletal: Normal range of motion.   Skin:     General: Skin is warm and dry.   Neurological:      Mental Status: He is alert and oriented to person, place, and time.      Cranial Nerves: No cranial nerve deficit.      Sensory: No sensory deficit.   Psychiatric:         Behavior: Behavior normal.         Thought Content: Thought content normal.         Judgment: Judgment normal.         Results from last 7 days "   Lab Units 12/14/20  0532 12/13/20  1948   SODIUM mmol/L 140 141   POTASSIUM mmol/L 5.5* 4.9   CHLORIDE mmol/L 101 96*   CO2 mmol/L 28.4 35.8*   BUN mg/dL 50* 44*   CREATININE mg/dL 1.92* 2.26*   CALCIUM mg/dL 9.3 9.7   ALBUMIN g/dL 3.90 4.70   BILIRUBIN mg/dL 0.6 1.0   ALK PHOS U/L 62 80   ALT (SGPT) U/L 7 8   AST (SGOT) U/L 13 15   GLUCOSE mg/dL 162* 130*   WBC 10*3/mm3 15.76* 18.89*   HEMOGLOBIN g/dL 11.8* 13.2   PLATELETS 10*3/mm3 170 191       Imaging Results (Last 24 Hours)     Procedure Component Value Units Date/Time    US Gallbladder [[id number]] Collected: 12/14/20 1332     Updated: 12/14/20 1335    Narrative:      PROCEDURE: US GALLBLADDER-     HISTORY: RUQ pain; A41.9-Sepsis, unspecified organism; N39.0-Urinary  tract infection, site not specified; N28.9-Disorder of kidney and  ureter, unspecified     FINDINGS: An 8 mm stone is noted within the noted within the neck of the  gallbladder. No gallbladder wall thickening or distention is present. No  fluid collections are seen.     There is no biliary ductal dilatation. No free fluid is seen. Limited  portions of the right kidney and right liver are unremarkable.       Impression:      Cholelithiasis. No associated biliary obstruction.        This report was finalized on 12/14/2020 1:33 PM by Terrance Cheema MD.    CT Abdomen Pelvis Without Contrast [[id number]] Collected: 12/13/20 2159     Updated: 12/13/20 2200    Narrative:      FINAL REPORT    CLINICAL HISTORY:  abdominal pain, fever    FINDINGS:  Multiple contiguous transaxial slices through the abdomen and  pelvis were obtained without the intravenous ministration of  contrast with coronal reformatted images.  There is bilateral  atelectasis versus scarring.  The liver is heterogeneous.  There  is a small gallstone without gallbladder wall thickening.  The  pancreas, spleen and adrenal glands are unremarkable.  Right  renal vascular calcification without evidence for obstructive  uropathy.  Nonspecific  bilateral perinephric fat stranding.  The  bowel gas pattern is nonobstructive.  There is diverticulosis  without diverticulitis.  No evidence for appendicitis.  Moderate  stool is present.  The bladder wall is thickened, nonspecific.  There is streak artifact from left hip prosthesis.  Aorta and  iliac arteries are calcified.      Impression:      Nonspecific bladder wall thickening, cannot exclude cystitis  diverticulosis without diverticulitis  cholelithiasis    Authenticated by Geovany Mullins MD on 12/13/2020 09:59:14 PM    XR Chest 1 View [[id number]] Collected: 12/13/20 2157     Updated: 12/13/20 2157    Narrative:      FINAL REPORT    CLINICAL HISTORY:  soa    FINDINGS:  Interstitial prominence is consistent with chronic lung disease.  There is nonspecific bilateral lower lobe airspace disease.  The  heart is enlarged.  Aortic knob is calcified.  Pacemaker and  neural stimulators are present.      Impression:      Chronic changes with bilateral airspace disease    Authenticated by Geovany Mullins MD on 12/13/2020 09:57:00 PM          Assessment / Plan      Assessment/Recommendations:   1.  Right upper quadrant pain  2.  Low-grade fever  3.  Cholelithiasis  4.  Chronic constipation mostly associated with opioid use  5.  Esophageal dysphagia    Etiology unclear at this time.  His recent CT scan abdomen pelvis and ultrasound reviewed. Given his persistent right upper abdominal pain and low-grade fever, leukocytosis and gallstone in the neck of the gallbladder he needs to be ruled out for acute cholecystitis although ultrasound does not show any gallbladder wall thickening or pericholecystic fluid.     He did have a colonic fecal loading on admission.  He had a multiple bowel movement since then.  His pain still persists.  Constipation could cause upper and right upper quadrant pain however given her persistent pain this appears unlikely    Gastric pathology is still possible however he had a recent EGD done  in October 2020 which did not reveal any significant gastric pathology.  He does not have any significant epigastric pain his pain is mostly on the right upper quadrant.     He has a bladder wall thickening which is chronic and does not seem to be related with his current pain.  I doubt cystitis is the cause of his current symptoms however if he had a pyelonephritis that could explain some degree of right-sided abdominal pain.     He does have a right lower quadrant bruise with the minimal hematoma at the prior injection site.  No significant right lower quadrant tenderness noted otherwise    Patient did have a history of esophageal dysphagia mostly associated with esophageal dysmotility, and that has improved after empiric esophageal dilatation recently in October 2020.     Continue IV antibiotic  Analgesics antiemetics as appropriate  Continue osmotic laxative MiraLAX 17 g p.o. daily along with Colace twice daily  Protonix 40 mg p.o. daily  Recommend HIDA scan  Consult surgery if HIDA scan is positive  Endoscopy may not give any significant information at this time   Will recommend further based on HIDA scan finding      Thank you very much for letting me participate in the care of this patient.  Please do not hesitate to call me if you have any questions.    Cale Madrid MD  [facility]  12/14/2020  19:00 EST    Please note that portions of this note may have been completed with a voice recognition program.

## 2020-12-14 NOTE — H&P
[facility] HOSPITALIST   HISTORY AND PHYSICAL      Name:  Jani Pena   Age:  70 y.o.  Sex:  male  :  1950  MRN:  [id number]   Visit Number:  [id number]  Admission Date:  2020  Date Of Service:  20  Primary Care Physician:  Miguel Rojas MD    Chief Complaint:   Abdominal Pain      History Of Presenting Illness:      Mr. Jani Pena is a pleasant 70-year-old white male with a history of COPD with chronic hypoxia, diverticulosis, chronic kidney disease, atrial fibrillation, and BPH who was transferred from [facility] for concerns of abdominal pain that has been bothering him for the last several days.  He stated that eating meals seem to have made through pain worse.  Pain may have started after he progressively tried to address constipation with numerous laxative medications.  His last bowel movement was about 2 days ago and seemed to be normal however he is starting to feel pain and pressure in the right upper quadrant once again.    On evaluation in the emergency room, CT imaging presented evidence of cystitis as well as perinephric stranding.  There was evidence of diverticulosis without diverticulitis.  Stools were noted in the colon however no signs of obstruction.  Creatinine was slightly elevated above baseline at 2.26 as compared to 1.79.  White count was elevated at 18.89.    Review Of Systems:  Review of Systems   Constitutional: Negative for chills, fatigue and fever.   HENT: Negative for congestion, ear pain, rhinorrhea, sinus pressure and sore throat.    Eyes: Negative for visual disturbance.   Respiratory: Negative for cough, chest tightness, shortness of breath and wheezing.    Cardiovascular: Negative for chest pain, palpitations and leg swelling.   Gastrointestinal: Positive for abdominal pain. Negative for blood in stool, constipation, diarrhea, nausea and vomiting.   Endocrine: Negative for polydipsia and  polyuria.   Genitourinary: Negative for dysuria and hematuria.   Musculoskeletal: Negative for arthralgias and back pain.   Skin: Negative for rash.   Neurological: Negative for dizziness, light-headedness, numbness and headaches.   Psychiatric/Behavioral: Negative for dysphoric mood and sleep disturbance. The patient is not nervous/anxious.         Past Medical History:    Past Medical History:   Diagnosis Date   • Abnormal liver enzymes    • Anemia    • Anxiety    • Arthritis    • Atherosclerotic heart disease of native coronary artery without angina pectoris    • Atrial fibrillation (CMS/HCC)    • Back pain    • BPH (benign prostatic hyperplasia)    • Cataract, bilateral    • Chest pain     2-3 MONTHS AGO.  PER PT, HAS ADDRESSED WITH CARDIOLOGIST.  STATES HAS NTG PRN.   • CHF (congestive heart failure) (CMS/HCC)    • Chronic bronchitis (CMS/HCC)    • Chronic kidney disease    • COPD (chronic obstructive pulmonary disease) (CMS/HCC)    • Degeneration of cervical intervertebral disc    • Depression    • Diverticulosis    • Dyspnea    • Esophageal reflux    • Esophagitis    • Gastritis    • Hematuria    • Hemorrhoids    • Hiatal hernia    • History of arthritis    • History of nuclear stress test     UNSURE OF DATE   • History of peripheral neuropathy    • History of ventricular septal defect    • History of ventricular septal defect    • Hyperlipidemia    • Hypertension    • Impaired functional mobility, balance, gait, and endurance    • Infectious diarrhea    • Kyphosis, acquired    • Lumbar radiculopathy    • Mitral and aortic valve disease    • Nephrolithiasis    • Phimosis    • Problems with swallowing     WITH FOOD   • Respiratory failure (CMS/HCC)    • Sleep apnea     BIPAP, too awkward to bring   • TIA (transient ischemic attack)     X3.   2014   • Ventral hernia    • Wears glasses     FOR READING       Past Surgical history:    Past Surgical History:   Procedure Laterality Date   • CARDIAC CATHETERIZATION      • CARDIAC CATHETERIZATION N/A 5/24/2018    Procedure: Left Heart Cath;  Surgeon: Edouard Robertson MD;  Location:  [facility];  Service: Cardiovascular   • CARDIAC ELECTROPHYSIOLOGY PROCEDURE N/A 1/31/2019    Procedure: PACEMAKER IMPLANTATION- DC;  Surgeon: Omar Encinas DO;  Location:  [facility];  Service: Cardiology   • COLONOSCOPY N/A 12/28/2019    Procedure: COLONOSCOPY WITH HOT POLYPECTOMY;  Surgeon: Celio Maldonado MD;  Location:  [facility];  Service: Gastroenterology   • ENDOSCOPY N/A 1/26/2019    Procedure: ESOPHAGOGASTRODUODENOSCOPY;  Surgeon: Brunner, Mark I, MD;  Location:  [facility];  Service: Gastroenterology   • ENDOSCOPY N/A 12/27/2019    Procedure: ESOPHAGOGASTRODUODENOSCOPY;  Surgeon: Valdemar Mcdonald MD;  Location:  [facility];  Service: General   • ENDOSCOPY N/A 10/20/2020    Procedure: ESOPHAGOGASTRODUODENOSCOPY WITH DILATION AND COLD FORCEP BIOPSY;  Surgeon: Cale Madrid MD;  Location: [facility] ENDOSCOPY;  Service: Gastroenterology;  Laterality: N/A;   • HERNIA REPAIR     • ORTHOPEDIC SURGERY Left     Left hip arthroplasty   • PACEMAKER IMPLANTATION  01/31/2019   • SUPRAPUBIC CATHETER INSERTION      History of Percutaneous Catheter Placement Into Ureter   • THROAT SURGERY      FOR SLEEP APNEA   • VSD REPAIR      History of VSD Repair By Patch Closure       Social History:    Social History     Socioeconomic History   • Marital status:      Spouse name: Not on file   • Number of children: Not on file   • Years of education: Not on file   • Highest education level: Not on file   Occupational History     Employer: RETIRED   Tobacco Use   • Smoking status: Former Smoker     Packs/day: 3.00     Years: 30.00     Pack years: 90.00     Types: Cigarettes     Quit date: 2017     Years since quitting: 3.9   • Smokeless tobacco: Never Used   Substance and Sexual Activity   • Alcohol use: No   • Drug use: No   • Sexual activity: Defer          Family History:    Family History   Problem Relation Age of Onset   • Other Father         CABG   • Coronary artery disease Father    • Colon cancer Neg Hx        Allergies:      Levaquin [levofloxacin] and Sulfa antibiotics    Home Medications:    Prior to Admission Medications     Prescriptions Last Dose Informant Patient Reported? Taking?    cyclobenzaprine (FLEXERIL) 5 MG tablet   Yes Yes    Take 5 mg by mouth 3 (Three) Times a Day As Needed for Muscle Spasms.    acetaminophen (TYLENOL) 500 MG tablet   No No    Take 1 tablet by mouth Every 6 (Six) Hours As Needed for Mild Pain .    alfuzosin (UROXATRAL) 10 MG 24 hr tablet   No No    @@ TAKE ONE TABLET BY MOUTH DAILY    amLODIPine (NORVASC) 5 MG tablet   No No    TAKE ONE TABLET BY MOUTH DAILY    ASPIRIN LOW DOSE 81 MG chewable tablet   No No    @@ TAKE ONE TABLET BY MOUTH DAILY    atorvastatin (LIPITOR) 10 MG tablet   No No    Take 1 tablet by mouth Every Evening.    benzonatate (TESSALON) 200 MG capsule   No No    TAKE ONE CAPSULE BY MOUTH THREE TIMES A DAY AS NEEDED FOR COUGH    carboxymethylcellulose (REFRESH PLUS) 0.5 % solution   Yes No    1 drop 3 (Three) Times a Day As Needed for Dry Eyes.    clopidogrel (PLAVIX) 75 MG tablet   No No    TAKE ONE TABLET BY MOUTH DAILY    docusate sodium (COLACE) 100 MG capsule   No No    Take 1 capsule by mouth 2 (Two) Times a Day As Needed for Constipation.    escitalopram (LEXAPRO) 20 MG tablet   Yes No    Take 20 mg by mouth Daily.    ferrous sulfate 325 (65 Fe) MG tablet   No No    TAKE ONE TABLET BY MOUTH DAILY    finasteride (PROSCAR) 5 MG tablet   Yes No    Take 5 mg by mouth Daily.    fluocinonide (LIDEX) 0.05 % cream   Yes No    Apply  topically to the appropriate area as directed 2 (Two) Times a Day.    Fluticasone-Umeclidin-Vilant (Trelegy Ellipta) 100-62.5-25 MCG/INH aerosol powder    No No    Inhale 1 puff Daily.    furosemide (LASIX) 20 MG tablet   No No    TAKE ONE TABLET BY MOUTH DAILY     HYDROcodone-acetaminophen (NORCO) 5-325 MG per tablet   No No    TAKE 1 TABLET BY MOUTH EVERY 8 HOURS AS NEEDED FOR SEVERE PAIN    hydrocortisone-eucerin   Yes No    Apply  topically to the appropriate area as directed.    ipratropium (Atrovent) 0.06 % nasal spray   No No    2 sprays into the nostril(s) as directed by provider 4 (Four) Times a Day.    ipratropium-albuterol (DUO-NEB) 0.5-2.5 mg/3 ml nebulizer   No No    Take 3 mL by nebulization Every 4 (Four) Hours As Needed for Wheezing.    lidocaine (LIDODERM) 5 %   No No    PLACE ONE PATCH ON SKIN AS DIRECTED,REMOVE & DISCARD PATCH WITHIN 12 HOURS    magnesium hydroxide (MILK OF MAGNESIA) 400 MG/5ML suspension   No No    Take 15 mL by mouth Daily As Needed for Constipation.    melatonin 5 MG tablet tablet   No No    @@ TAKE 1 TABLET BY MOUTH EVERY EVENING    mirtazapine (REMERON) 15 MG tablet   Yes No    Take 15 mg by mouth Every Night.    O2 (OXYGEN)  Self Yes No    Inhale 4 L/min 1 (One) Time. Walking on 4L and sitting 3L    oxybutynin (DITROPAN) 5 MG tablet   No No    OAKE 1 TABLET BY MOUTH DAILY AT BEDTIME    pantoprazole (PROTONIX) 40 MG EC tablet   No No    TAKE ONE TABLET BY MOUTH DAILY    polyethylene glycol (MIRALAX) 17 g packet   No No    Take 17 g by mouth Daily.    potassium chloride 10 MEQ CR tablet  Self Yes No    Take 10 mEq by mouth Daily.    predniSONE (DELTASONE) 10 MG tablet   No No    Take 1 tablet by mouth Daily.    promethazine (PHENERGAN) 25 MG tablet   Yes No    Take 25 mg by mouth As Needed for Nausea or Vomiting.    traZODone (DESYREL) 50 MG tablet   Yes No    Take 50 mg by mouth Every Night.             ED Medications:    Medications   sodium chloride 0.9 % flush 10 mL (has no administration in time range)   acetaminophen (TYLENOL) tablet 500 mg (has no administration in time range)   tamsulosin (FLOMAX) 24 hr capsule 0.4 mg (has no administration in time range)   amLODIPine (NORVASC) tablet 5 mg (has no administration in time range)    aspirin chewable tablet 81 mg (has no administration in time range)   atorvastatin (LIPITOR) tablet 10 mg (has no administration in time range)   benzonatate (TESSALON) capsule 200 mg (has no administration in time range)   clopidogrel (PLAVIX) tablet 75 mg (has no administration in time range)   cyclobenzaprine (FLEXERIL) tablet 5 mg (has no administration in time range)   docusate sodium (COLACE) capsule 100 mg (has no administration in time range)   escitalopram (LEXAPRO) tablet 20 mg (has no administration in time range)   ferrous sulfate EC tablet 324 mg (has no administration in time range)   finasteride (PROSCAR) tablet 5 mg (has no administration in time range)   Pharmacy to dose Trelegy (has no administration in time range)   furosemide (LASIX) tablet 20 mg (has no administration in time range)   HYDROcodone-acetaminophen (NORCO) 5-325 MG per tablet 1 tablet (has no administration in time range)   magnesium hydroxide (MILK OF MAGNESIA) suspension 2400 mg/10mL 10 mL (has no administration in time range)   melatonin tablet 5 mg (has no administration in time range)   mirtazapine (REMERON) tablet 15 mg (has no administration in time range)   pantoprazole (PROTONIX) EC tablet 40 mg (has no administration in time range)   polyethylene glycol (MIRALAX) packet 17 g (has no administration in time range)   traZODone (DESYREL) tablet 50 mg (has no administration in time range)   sodium chloride 0.9 % flush 10 mL (has no administration in time range)   sodium chloride 0.9 % flush 10 mL (has no administration in time range)   enoxaparin (LOVENOX) syringe 40 mg (has no administration in time range)   ondansetron (ZOFRAN) injection 4 mg (has no administration in time range)   cefTRIAXone (ROCEPHIN) IVPB 1 g/50ml dextrose (premix) (has no administration in time range)   magnesium sulfate 2g/50 mL (PREMIX) infusion (0 g Intravenous Stopped 12/13/20 2013)   ipratropium-albuterol (DUO-NEB) nebulizer solution 3 mL (3 mL  Nebulization Given 12/13/20 2108)   cefTRIAXone (ROCEPHIN) IVPB 1 g/50ml dextrose (premix) (0 g Intravenous Stopped 12/13/20 2117)   methylPREDNISolone sodium succinate (SOLU-Medrol) injection 125 mg (125 mg Intravenous Given 12/13/20 2012)   acetaminophen (TYLENOL) tablet 500 mg (500 mg Oral Given 12/13/20 2020)       Vital Signs:    Temp:  [97.5 °F (36.4 °C)-100.3 °F (37.9 °C)] 97.5 °F (36.4 °C)  Heart Rate:  [] 75  Resp:  [18-26] 22  BP: (115-190)/(72-95) 140/79        12/13/20 1935 12/13/20 2345   Weight: 77.1 kg (170 lb) 78 kg (172 lb)       Body mass index is 26.15 kg/m².    Physical Exam:  Physical Exam  Vitals signs and nursing note reviewed.   Constitutional:       Appearance: Normal appearance. He is well-developed.   HENT:      Head: Normocephalic and atraumatic.      Right Ear: Tympanic membrane and external ear normal.      Left Ear: Tympanic membrane and external ear normal.      Nose: Nose normal. No congestion.      Mouth/Throat:      Mouth: Mucous membranes are moist.      Pharynx: No oropharyngeal exudate.   Eyes:      General: No scleral icterus.        Right eye: No discharge.      Pupils: Pupils are equal, round, and reactive to light.   Neck:      Musculoskeletal: Normal range of motion and neck supple.      Thyroid: No thyromegaly.      Vascular: No JVD.   Cardiovascular:      Rate and Rhythm: Normal rate and regular rhythm.      Heart sounds: Normal heart sounds. No murmur. No friction rub.   Pulmonary:      Effort: Pulmonary effort is normal. No respiratory distress.      Breath sounds: Normal breath sounds. No stridor. No wheezing.   Abdominal:      General: Bowel sounds are normal. There is no distension.      Palpations: Abdomen is soft.      Tenderness: There is no abdominal tenderness. There is no guarding.   Genitourinary:     Comments: Deferred  Musculoskeletal: Normal range of motion.         General: No tenderness.   Lymphadenopathy:      Cervical: No cervical adenopathy.    Skin:     General: Skin is warm and dry.      Findings: No rash.   Neurological:      Mental Status: He is alert and oriented to person, place, and time.      Cranial Nerves: No cranial nerve deficit.   Psychiatric:         Mood and Affect: Mood normal.         Behavior: Behavior normal.         Thought Content: Thought content normal.         EKG:      Ventricular paced rhythm    Labs:    Lab Results (last 24 hours)     Procedure Component Value Units Date/Time    Urinalysis, Microscopic Only - Urine, Clean Catch [[id number]]  (Abnormal) Collected: 12/13/20 2137    Specimen: Urine, Clean Catch Updated: 12/13/20 2152     RBC, UA 6-12 /HPF      WBC, UA 31-50 /HPF      Bacteria, UA 3+ /HPF      Squamous Epithelial Cells, UA 0-2 /HPF      Hyaline Casts, UA None Seen /LPF      Methodology Manual Light Microscopy    Urine Culture - Urine, Urine, Clean Catch [[id number]] Collected: 12/13/20 2137    Specimen: Urine, Clean Catch Updated: 12/13/20 2152    Urinalysis With Culture If Indicated - Urine, Clean Catch [[id number]]  (Abnormal) Collected: 12/13/20 2137    Specimen: Urine, Clean Catch Updated: 12/13/20 2145     Color, UA Yellow     Appearance, UA Cloudy     pH, UA <=5.0     Specific Gravity, UA 1.017     Glucose, UA Negative     Ketones, UA Negative     Bilirubin, UA Negative     Blood, UA Small (1+)     Protein, UA 30 mg/dL (1+)     Leuk Esterase, UA Moderate (2+)     Nitrite, UA Negative     Urobilinogen, UA 0.2 E.U./dL    Jackpot Draw [[id number]] Collected: 12/13/20 1948    Specimen: Blood Updated: 12/13/20 2100    Narrative:      The following orders were created for panel order Jackpot Draw.  Procedure                               Abnormality         Status                     ---------                               -----------         ------                     Light Blue Top[[id number]]                                   Final result               Green Top (Gel)[[id number]]                                  Final  result               Lavender Top[[id number]]                                     Final result               Gold Top - SST[[id number]]                                   Final result               Green Top (No Gel)[[id number]]                               In process                   Please view results for these tests on the individual orders.    Light Blue Top [[id number]] Collected: 12/13/20 1948    Specimen: Blood Updated: 12/13/20 2100     Extra Tube hold for add-on     Comment: Auto resulted       Green Top (Gel) [[id number]] Collected: 12/13/20 1948    Specimen: Blood Updated: 12/13/20 2100     Extra Tube Hold for add-ons.     Comment: Auto resulted.       Lavender Top [[id number]] Collected: 12/13/20 1948    Specimen: Blood Updated: 12/13/20 2100     Extra Tube hold for add-on     Comment: Auto resulted       Gold Top - SST [[id number]] Collected: 12/13/20 1948    Specimen: Blood Updated: 12/13/20 2100     Extra Tube Hold for add-ons.     Comment: Auto resulted.       COVID-19,[facility]-HOUSE,Nasal Swab No Transport Media 3-4 HR TAT - Swab, Nasal Cavity [[id number]]  (Normal) Collected: 12/13/20 2020    Specimen: Swab from Nasal Cavity Updated: 12/13/20 2054     COVID19 Not Detected    Narrative:      Fact sheet for providers: https://www.fda.gov/media/331098/download     Fact sheet for patients: https://www.fda.gov/media/470900/download    Test performed by PCR.    Lactic Acid, Plasma [[id number]]  (Normal) Collected: 12/13/20 1948    Specimen: Blood Updated: 12/13/20 2035     Lactate 1.0 mmol/L     Troponin [[id number]]  (Abnormal) Collected: 12/13/20 1948    Specimen: Blood Updated: 12/13/20 2031     Troponin T 0.040 ng/mL     Narrative:      Troponin T Reference Range:  <= 0.03 ng/mL-   Negative for AMI  >0.03 ng/mL-     Abnormal for myocardial necrosis.  Clinicians would have to utilize clinical acumen, EKG, Troponin and serial changes to determine if it is an Acute Myocardial Infarction or myocardial injury  due to an underlying chronic condition.       Results may be falsely decreased if patient taking Biotin.      Blood Culture - Blood, Arm, Left [[id number]] Collected: 12/13/20 2015    Specimen: Blood from Arm, Left Updated: 12/13/20 2026    Blood Culture - Blood, Hand, Left [[id number]] Collected: 12/13/20 2019    Specimen: Blood from Hand, Left Updated: 12/13/20 2026    BNP [[id number]]  (Abnormal) Collected: 12/13/20 1948    Specimen: Blood Updated: 12/13/20 2020     proBNP 5,898.0 pg/mL     Narrative:      Among patients with dyspnea, NT-proBNP is highly sensitive for the detection of acute congestive heart failure. In addition NT-proBNP of <300 pg/ml effectively rules out acute congestive heart failure with 99% negative predictive value.    Results may be falsely decreased if patient taking Biotin.      Comprehensive Metabolic Panel [[id number]]  (Abnormal) Collected: 12/13/20 1948    Specimen: Blood Updated: 12/13/20 2015     Glucose 130 mg/dL      BUN 44 mg/dL      Creatinine 2.26 mg/dL      Sodium 141 mmol/L      Potassium 4.9 mmol/L      Chloride 96 mmol/L      CO2 35.8 mmol/L      Calcium 9.7 mg/dL      Total Protein 7.6 g/dL      Albumin 4.70 g/dL      ALT (SGPT) 8 U/L      AST (SGOT) 15 U/L      Alkaline Phosphatase 80 U/L      Total Bilirubin 1.0 mg/dL      eGFR Non African Amer 29 mL/min/1.73      Globulin 2.9 gm/dL      A/G Ratio 1.6 g/dL      BUN/Creatinine Ratio 19.5     Anion Gap 9.2 mmol/L     Narrative:      GFR Normal >60  Chronic Kidney Disease <60  Kidney Failure <15      CBC & Differential [[id number]]  (Abnormal) Collected: 12/13/20 1948    Specimen: Blood Updated: 12/13/20 1954    Narrative:      The following orders were created for panel order CBC & Differential.  Procedure                               Abnormality         Status                     ---------                               -----------         ------                     CBC Auto Differential[[id number]]        Abnormal             Final result                 Please view results for these tests on the individual orders.    CBC Auto Differential [[id number]]  (Abnormal) Collected: 12/13/20 1948    Specimen: Blood Updated: 12/13/20 1954     WBC 18.89 10*3/mm3      RBC 4.33 10*6/mm3      Hemoglobin 13.2 g/dL      Hematocrit 42.9 %      MCV 99.1 fL      MCH 30.5 pg      MCHC 30.8 g/dL      RDW 12.7 %      RDW-SD 46.9 fl      MPV 8.6 fL      Platelets 191 10*3/mm3      Neutrophil % 81.6 %      Lymphocyte % 7.9 %      Monocyte % 9.4 %      Eosinophil % 0.4 %      Basophil % 0.3 %      Immature Grans % 0.4 %      Neutrophils, Absolute 15.43 10*3/mm3      Lymphocytes, Absolute 1.49 10*3/mm3      Monocytes, Absolute 1.77 10*3/mm3      Eosinophils, Absolute 0.08 10*3/mm3      Basophils, Absolute 0.05 10*3/mm3      Immature Grans, Absolute 0.07 10*3/mm3      nRBC 0.0 /100 WBC     Green Top (No Gel) [[id number]] Collected: 12/13/20 1948    Specimen: Blood Updated: 12/13/20 1952          Radiology:    Imaging Results (Last 72 Hours)     Procedure Component Value Units Date/Time    CT Abdomen Pelvis Without Contrast [[id number]] Collected: 12/13/20 2159     Updated: 12/13/20 2200    Narrative:      FINAL REPORT    CLINICAL HISTORY:  abdominal pain, fever    FINDINGS:  Multiple contiguous transaxial slices through the abdomen and  pelvis were obtained without the intravenous ministration of  contrast with coronal reformatted images.  There is bilateral  atelectasis versus scarring.  The liver is heterogeneous.  There  is a small gallstone without gallbladder wall thickening.  The  pancreas, spleen and adrenal glands are unremarkable.  Right  renal vascular calcification without evidence for obstructive  uropathy.  Nonspecific bilateral perinephric fat stranding.  The  bowel gas pattern is nonobstructive.  There is diverticulosis  without diverticulitis.  No evidence for appendicitis.  Moderate  stool is present.  The bladder wall is thickened,  nonspecific.  There is streak artifact from left hip prosthesis.  Aorta and  iliac arteries are calcified.      Impression:      Nonspecific bladder wall thickening, cannot exclude cystitis  diverticulosis without diverticulitis  cholelithiasis    Authenticated by Geovany Mullins MD on 12/13/2020 09:59:14 PM    XR Chest 1 View [[id number]] Collected: 12/13/20 2157     Updated: 12/13/20 2157    Narrative:      FINAL REPORT    CLINICAL HISTORY:  soa    FINDINGS:  Interstitial prominence is consistent with chronic lung disease.  There is nonspecific bilateral lower lobe airspace disease.  The  heart is enlarged.  Aortic knob is calcified.  Pacemaker and  neural stimulators are present.      Impression:      Chronic changes with bilateral airspace disease    Authenticated by Geovany Mullins MD on 12/13/2020 09:57:00 PM          Assessment:    Atrial fibrillation (CMS/HCC)    Chronic kidney disease    Degeneration of cervical intervertebral disc    Gastroesophageal reflux disease    Diverticulosis    Chronic respiratory failure with hypoxia and hypercapnia (CMS/HCC)    Sepsis secondary to UTI (CMS/HCC)      Plan:   Sepsis due to UTI  -Obtain urine cultures.  Adjust antibiotics based on cultures.  Patient has been started on Rocephin.  -Fluids given in the emergency room.  Additional small bolus given tonight.  -Continue BPH medications but hold oxybutynin.    ARTIS  -Likely prerenal.  Small bolus was given.  Monitor renal function in the morning.    Right upper quadrant abdominal pain  -Original reason patient presented was for this pain.  CT imaging was more concerning for cystitis.  Right upper quadrant pain may be related to constipation.  Ensure patient has bowel movement during the day shift.    GERD  -Stable on current PPI medications.    Sick sinus syndrome/atrial fibrillation  -Ventricularly paced rhythm.  Continue current cardiac medication.    Chronic pain with degenerative disc disease  -Pain medications  continued.    Insomnia  -Continue trazodone and melatonin.          Lio Snell,   12/14/20  01:12 EST

## 2020-12-14 NOTE — PROGRESS NOTES
[facility]    Name:  Jani Pena   Age:  70 y.o.  Sex:  male  :  1950  MRN:  [id number]   Visit Number:  [id number]  Admission Date:  2020  Date Of Service:  20  Primary Care Physician:  Miugel Rojas MD    Patient was seen and examined with the nurse practitioner. Pertinent laboratory and radiology data were reviewed.  Mr. Gunter is currently sitting up on the chair and is feeling better.  He does have right upper and right lower quadrant pains.  He was admitted with acute renal failure and urinary tract infection.  He is currently on IV antibiotics therapy with Rocephin and IV fluids.  His renal function has improved.    Vital signs:    Vital Signs (last 24 hours)        0700  -   0659  0700  -   1814   Most Recent    Temp (°F) 97.4 -  100.3    97.6 -  98.2     98.2 (36.8)    Heart Rate 69 -  102      90     90    Resp 18 -  26    16 -  18     16    /72 -  190/95    131/67 -  156/88     138/74    SpO2 (%) 95 -  99    97 -  98     98        Patient does have significant tenderness on palpation in the right upper quadrant and right lower quadrant.  CT of the abdomen and pelvis done in the emergency room showed nonspecific bladder wall thickening and diverticulosis.  Cholelithiasis was noted without gallbladder wall thickening.    We will get a right upper quadrant ultrasound to assess for his gallbladder to rule out cholecystitis.  We will start him on diet following the ultrasound.    Patient currently lives at assisted living facility at [facility].  He states he has been living there for the last 1 year.  His home medications have been continued.  We will consult physical and Occupational Therapy.  Agree with assessment and plan of PAL Witt.    Addendum:    Ultrasound report was reviewed.  It shows gallstone at 8 mm without any evidence of cholecystitis on the ultrasound.  We will recheck him tomorrow  clinically for his tenderness and consult surgical services if necessary.    Ferdinand Greer MD  12/14/20  18:14 EST    Dictated utilizing Dragon dictation.

## 2020-12-15 NOTE — PROGRESS NOTES
[facility]    Name:  Jani Pena   Age:  70 y.o.  Sex:  male  :  1950  MRN:  [id number]   Visit Number:  [id number]  Admission Date:  2020  Date Of Service:  12/15/20  Primary Care Physician:  Miguel Rojas MD     LOS: 2 days :  Patient Care Team:  Miguel Rojas MD as PCP - General  Miguel Rojas MD as PCP - Family Medicine  Ulysses Bass MD as Cardiologist (Cardiology):    Chief Complaint:      Abdominal pain    Subjective / Interval History:     I have reviewed the patient's chart, notes and labs.  I examined the patient after he returned from getting his HIDA scan.  Patient states that his abdominal pain is better, but it is .  Patient states his Lovenox shots are probably not helping his abdominal pain and soreness.  Patient  to palpation on the right side of abdomen.  UTI with sensitivities pending.  Patient states his COPD is a little bit worse today, but overall, he is feeling better.  Patient updated on plan of care and is agreeable.    Review of Systems:     General ROS: Patient denies any fevers, chills or loss of consciousness.  Respiratory ROS: + shortness of breath.  Cardiovascular ROS: Denies chest pain or palpitations. No history of exertional chest pain.  Gastrointestinal ROS: + right sided abdominal pain. No N/V/D  Neurological ROS: Denies any focal weakness. No loss of consciousness. Denies any numbness.  Dermatological ROS: Denies any redness or pruritis.    Vital Signs:    Temp:  [97.6 °F (36.4 °C)-98.3 °F (36.8 °C)] 97.6 °F (36.4 °C)  Heart Rate:  [66-89] 80  Resp:  [16-20] 18  BP: (107-144)/(57-78) 140/68    Intake and output:    I/O last 3 completed shifts:  In: 50 [IV Piggyback:50]  Out: 2750 [Urine:2750]  I/O this shift:  In: 240 [P.O.:240]  Out: 200 [Urine:200]    Physical Examination:    General Appearance:  Alert and cooperative, not in any acute distress, chronically ill gentleman resting  in bed on examination   Head:  Atraumatic and normocephalic, without obvious abnormality.   Eyes:          Conjunctivae and sclerae normal.  Extraocular movements are within normal limits.   Neck: Supple, trachea midline.   Lungs:   Chest shape is normal. Breath sounds heard bilaterally equally.  No crackles or wheezing. Diminished throughout.  No pleural rub or bronchial breathing.   Heart:  Normal S1 and S2, no murmur, no gallop, no rub. No JVD   Abdomen:   Normal bowel sounds, no masses, no organomegaly. Soft with tenderness and guarding to the right side of abdomen.   Extremities: Moves all extremities, no edema, no cyanosis, no clubbing.   Skin: No bleeding, bruising or rash.   Neurologic: Awake, alert and oriented times 3. Moves all 4 extremities equally.     Laboratory results:    Results from last 7 days   Lab Units 12/15/20  0619 12/14/20  0532 12/13/20  1948   SODIUM mmol/L 142 140 141   POTASSIUM mmol/L 4.5 5.5* 4.9   CHLORIDE mmol/L 101 101 96*   CO2 mmol/L 33.5* 28.4 35.8*   BUN mg/dL 54* 50* 44*   CREATININE mg/dL 1.69* 1.92* 2.26*   CALCIUM mg/dL 9.2 9.3 9.7   BILIRUBIN mg/dL  --  0.6 1.0   ALK PHOS U/L  --  62 80   ALT (SGPT) U/L  --  7 8   AST (SGOT) U/L  --  13 15   GLUCOSE mg/dL 99 162* 130*     Results from last 7 days   Lab Units 12/15/20  0619 12/14/20  0532 12/13/20  1948   WBC 10*3/mm3 11.41* 15.76* 18.89*   HEMOGLOBIN g/dL 11.6* 11.8* 13.2   HEMATOCRIT % 37.5 37.8 42.9   PLATELETS 10*3/mm3 195 170 191         Results from last 7 days   Lab Units 12/15/20  0619 12/13/20  1948   TROPONIN T ng/mL 0.026 0.040*     Results from last 7 days   Lab Units 12/13/20 2137 12/13/20 2019 12/13/20 2015   BLOODCX   --  No growth at 24 hours No growth at 24 hours   URINECX  >100,000 CFU/mL Gram Negative Bacilli*  --   --            I have reviewed the patient's laboratory results.     Radiology results:    Imaging Results (Last 24 Hours)     Procedure Component Value Units Date/Time    NM Hepatobiliary  Without CCK [[id number]] Collected: 12/15/20 1226     Updated: 12/15/20 1229    Narrative:      PROCEDURE: NM HEPATOBILIARY WITHOUT CCK-     HISTORY: RUQ pain, fever, stone neck of the GB to ruleout cholecystitis;  A41.9-Sepsis, unspecified organism; N39.0-Urinary tract infection, site  not specified; N28.9-Disorder of kidney and ureter, unspecified     Technique: Following intravenous administration of approximately 6.3 mCi  of TC Choletec, multiple scintigraphic images were obtained.     FINDINGS: The hepatic parenchymal phase shows homogeneous distribution  of tracer throughout the liver. Prompt appearance of tracer is noted in  the intrahepatic and extrahepatic biliary systems. The gallbladder  visualizes normally. Biliary to bowel transit is within normal limits.       Impression:      Normal visualization of activity within biliary tree,  gallbladder and drainage into small bowel.     This report was finalized on 12/15/2020 12:27 PM by Terrance Cheema MD.    US Gallbladder [[id number]] Collected: 12/14/20 1332     Updated: 12/14/20 1335    Narrative:      PROCEDURE: US GALLBLADDER-     HISTORY: RUQ pain; A41.9-Sepsis, unspecified organism; N39.0-Urinary  tract infection, site not specified; N28.9-Disorder of kidney and  ureter, unspecified     FINDINGS: An 8 mm stone is noted within the noted within the neck of the  gallbladder. No gallbladder wall thickening or distention is present. No  fluid collections are seen.     There is no biliary ductal dilatation. No free fluid is seen. Limited  portions of the right kidney and right liver are unremarkable.       Impression:      Cholelithiasis. No associated biliary obstruction.        This report was finalized on 12/14/2020 1:33 PM by Terrance Cheema MD.          I have reviewed the patient's radiology reports.    Medication Review:     I have reviewed the patient's active and prn medications.     Problem List:      Atrial fibrillation (CMS/HCC)    Chronic kidney  disease    Degeneration of cervical intervertebral disc    Gastroesophageal reflux disease    Diverticulosis    Chronic respiratory failure with hypoxia and hypercapnia (CMS/HCC)    Sepsis secondary to UTI (CMS/HCC)      Assessment:  Abdominal pain  Sepsis secondary to UTI, present on arrival  Chronic respiratory failure with hypoxia and hypercapnia  Cholelithiasis without obstruction  Atrial Fibrillation  CKD  Degeneration of cervical intervertebral disc  GERD  Diverticulosis    Plan:    Continue antibiotic therapy for UTI.  Creatinine still trending down today at 1.69.  WBC today 11.41.  Continue bowel management.  Lovenox for DVT prophylaxis.  US of Gallbladder negative for acute cholecystitis, although 8mm stone was noted to be present.  Will continue supportive care during hospital visit.  Oxygen and respiratory treatments as ordered for chronic COPD.  Continue GI soft diet as tolerated.  Patient continues to work with PT/OT diligently.  Will continue to monitor lab work, vital signs, and pain.  Likely discharge back to assisted living at [facility] tomorrow.  Patient agreeable to plan and thankful for treatment.    Anna Piña, PAL  12/15/20  12:48 EST    Dictated utilizing Dragon dictation.     Singaporean

## 2020-12-15 NOTE — THERAPY TREATMENT NOTE
Patient Name: Jani Pena  : 1950    MRN: [id number]                              Today's Date: 12/15/2020       Admit Date: 2020    Visit Dx:     ICD-10-CM ICD-9-CM   1. Sepsis secondary to UTI (CMS/HCC)  A41.9 038.9    N39.0 995.91     599.0   2. Acute kidney insufficiency  N28.9 593.9     Patient Active Problem List   Diagnosis   • Anxiety   • Atrial fibrillation (CMS/HCC)   • Chronic kidney disease   • Steroid-dependent COPD (CMS/HCC)   • Degeneration of cervical intervertebral disc   • Gastroesophageal reflux disease   • Diverticulosis   • Hemorrhoids   • Hiatal hernia   • Hyperlipidemia   • Kyphosis, acquired   • PEDRO on CPAP   • Benign prostatic hyperplasia with incomplete bladder emptying   • Depression   • History of ventricular septal defect   • Chronic diastolic congestive heart failure (CMS/HCC)   • Coronary artery disease involving native coronary artery of native heart with angina pectoris (CMS/HCC)   • Lumbar radiculopathy   • Anemia   • Cardiac pacemaker in situ   • Sick sinus syndrome (CMS/HCC)   • Chronic respiratory failure with hypoxia and hypercapnia (CMS/HCC)   • Hematoma   • Flexural eczema   • Xerosis of skin   • Abnormal urinalysis   • Esophageal dysphagia   • Fall   • Presbyesophagus   • Sepsis secondary to UTI (CMS/HCC)     Past Medical History:   Diagnosis Date   • Abnormal liver enzymes    • Anemia    • Anxiety    • Arthritis    • Atherosclerotic heart disease of native coronary artery without angina pectoris    • Atrial fibrillation (CMS/HCC)    • Back pain    • BPH (benign prostatic hyperplasia)    • Cataract, bilateral    • Chest pain     2-3 MONTHS AGO.  PER PT, HAS ADDRESSED WITH CARDIOLOGIST.  STATES HAS NTG PRN.   • CHF (congestive heart failure) (CMS/HCC)    • Chronic bronchitis (CMS/HCC)    • Chronic kidney disease    • COPD (chronic obstructive pulmonary disease) (CMS/HCC)    • Degeneration of cervical intervertebral disc    • Depression    • Diverticulosis     • Dyspnea    • Esophageal reflux    • Esophagitis    • Gastritis    • Hematuria    • Hemorrhoids    • Hiatal hernia    • History of arthritis    • History of nuclear stress test     UNSURE OF DATE   • History of peripheral neuropathy    • History of ventricular septal defect    • History of ventricular septal defect    • Hyperlipidemia    • Hypertension    • Impaired functional mobility, balance, gait, and endurance    • Infectious diarrhea    • Kyphosis, acquired    • Lumbar radiculopathy    • Mitral and aortic valve disease    • Nephrolithiasis    • Phimosis    • Problems with swallowing     WITH FOOD   • Respiratory failure (CMS/HCC)    • Sleep apnea     BIPAP, too awkward to bring   • TIA (transient ischemic attack)     X3.   2014   • Ventral hernia    • Wears glasses     FOR READING     Past Surgical History:   Procedure Laterality Date   • CARDIAC CATHETERIZATION     • CARDIAC CATHETERIZATION N/A 5/24/2018    Procedure: Left Heart Cath;  Surgeon: Edouard Robertson MD;  Location:  [facility];  Service: Cardiovascular   • CARDIAC ELECTROPHYSIOLOGY PROCEDURE N/A 1/31/2019    Procedure: PACEMAKER IMPLANTATION- DC;  Surgeon: Omar Encinas DO;  Location:  [facility]VAS[facility];  Service: Cardiology   • COLONOSCOPY N/A 12/28/2019    Procedure: COLONOSCOPY WITH HOT POLYPECTOMY;  Surgeon: Celio Maldonado MD;  [facility]:  [facility];  Service: Gastroenterology   • ENDOSCOPY N/A 1/26/2019    Procedure: ESOPHAGOGASTRODUODENOSCOPY;  Surgeon: Brunner, Mark I, MD;  Location:  [facility];  Service: Gastroenterology   • ENDOSCOPY N/A 12/27/2019    Procedure: ESOPHAGOGASTRODUODENOSCOPY;  Surgeon: Valdemar Mcdonald MD;  Location: [facility] ENDOSCOPY;  Service: General   • ENDOSCOPY N/A 10/20/2020    Procedure: ESOPHAGOGASTRODUODENOSCOPY WITH DILATION AND COLD FORCEP BIOPSY;  Surgeon: Cale Madrid MD;  Location: [facility] ENDOSCOPY;  Service: Gastroenterology;  Laterality: N/A;   • HERNIA  REPAIR     • ORTHOPEDIC SURGERY Left     Left hip arthroplasty   • PACEMAKER IMPLANTATION  01/31/2019   • SUPRAPUBIC CATHETER INSERTION      History of Percutaneous Catheter Placement Into Ureter   • THROAT SURGERY      FOR SLEEP APNEA   • VSD REPAIR      History of VSD Repair By Patch Closure     General Information     [facility] Name 12/15/20 1512          Physical Therapy Time and Intention    Document Type  therapy note (daily note)  -     Mode of Treatment  physical therapy  -[facility] Name 12/15/20 1512          General Information    Patient Profile Reviewed  yes  -       User Key  (r) = Recorded By, (t) = Taken By, (c) = Cosigned By    Initials Name Provider Type     Kaylee Kurtz, PT Physical Therapist        Mobility     Row Name 12/15/20 1512          Bed Mobility    Bed Mobility  supine-sit;sit-supine  -     Supine-Sit [place] (Bed Mobility)  supervision  -     Sit-Supine [place] (Bed Mobility)  supervision  -     Assistive Device (Bed Mobility)  head of bed elevated  -[facility] Name 12/15/20 1512          Sit-Stand Transfer    Sit-Stand [place] (Transfers)  contact guard;minimum assist (75% patient effort)  -     Assistive Device (Sit-Stand Transfers)  walker, front-wheeled  -       User Key  (r) = Recorded By, (t) = Taken By, (c) = Cosigned By    Initials Name Provider Type     Kaylee Kurtz, PT Physical Therapist        Obj/Interventions     [facility] Name 12/15/20 1512          Motor Skills    Motor Skills  therapeutic exercise  -     Therapeutic Exercise  hip;ankle;knee  -JR     Row Name 12/15/20 1512          Hip (Therapeutic Exercise)    Hip (Therapeutic Exercise)  strengthening exercise  -     Hip Strengthening (Therapeutic Exercise)  bilateral;flexion;extension;aBduction;aDduction;marching while seated;sitting;10 repetitions;2 sets  -[facility] Name 12/15/20 1512          Knee (Therapeutic Exercise)    Knee (Therapeutic Exercise)  strengthening exercise  -     Knee  Strengthening (Therapeutic Exercise)  bilateral;LAQ (long arc quad);10 repetitions;2 sets  -     Row Name 12/15/20 1512          Ankle (Therapeutic Exercise)    Ankle (Therapeutic Exercise)  strengthening exercise  -     Ankle Strengthening (Therapeutic Exercise)  bilateral;dorsiflexion;plantarflexion;10 repetitions;2 sets  -       User Key  (r) = Recorded By, (t) = Taken By, (c) = Cosigned By    Initials Name Provider Type    Kaylee Lemos, PT Physical Therapist        Goals/Plan    No documentation.       Clinical Impression     Row Name 12/15/20 1512          Pain    Additional Documentation  Pain Scale: Numbers Pre/Post-Treatment (Group)  -[facility] Name 12/15/20 1512          Pain Scale: Numbers Pre/Post-Treatment    Pretreatment Pain Rating  5/10  -     Posttreatment Pain Rating  5/10  -     Pain Location  abdomen  -     Pain Intervention(s)  Repositioned;Ambulation/increased activity  -     Row Name 12/15/20 1512          Plan of Care Review    Plan of Care Reviewed With  patient  -     Progress  improving  -     Outcome Summary  Patient demonstrates improving strength as seen by his ability to perform therapueic exercises x 2 sets of 10.  He stated he had a long day and did not want to walk again.  Patient is expected to benefit from additional PT services.  -     Row Name 12/15/20 1512          Positioning and Restraints    Pre-Treatment Position  in bed  -JR     Post Treatment Position  bed  -JR     In Bed  supine;call light within reach;encouraged to call for assist  -       User Key  (r) = Recorded By, (t) = Taken By, (c) = Cosigned By    Initials Name Provider Type    Kaylee Lemos, PT Physical Therapist        Outcome Measures     Row Name 12/15/20 1512          How much help from another person do you currently need...    Turning from your back to your side while in flat bed without using bedrails?  3  -JR     Moving from lying on back to sitting on the side of a flat bed  without bedrails?  3  -JR     Moving to and from a bed to a chair (including a wheelchair)?  3  -JR     Standing up from a chair using your arms (e.g., wheelchair, bedside chair)?  3  -JR     Climbing 3-5 steps with a railing?  2  -JR     To walk in hospital room?  3  -JR     AM-PAC 6 Clicks Score (PT)  17  -JR     Row Name 12/15/20 1512          Functional Assessment    Outcome Measure Options  AM-PAC 6 Clicks Basic Mobility (PT)  -       User Key  (r) = Recorded By, (t) = Taken By, (c) = Cosigned By    Initials Name Provider Type     Kaylee Kurtz, PT Physical Therapist        Physical Therapy Education                 Title: PT OT SLP Therapies (Done)     Topic: Physical Therapy (Done)     Point: Mobility training (Done)     Learning Progress Summary           Patient Acceptance, E, VU by GMÓEZ at 12/14/2020 2156    Acceptance, E,TB, VU by MD at 12/14/2020 1437    Acceptance, E,TB, VU by  at 12/14/2020 1132    Comment: Role of PT per POC                   Point: Home exercise program (Done)     Learning Progress Summary           Patient Acceptance, E,TB, VU by  at 12/15/2020 1849    Comment: Patient is instructed on breathing coordination with exercises    Acceptance, E, VU by GÓMEZ at 12/14/2020 2156    Acceptance, E,TB, VU by MD at 12/14/2020 1437                   Point: Body mechanics (Done)     Learning Progress Summary           Patient Acceptance, E, VU by GÓMEZ at 12/14/2020 2156    Acceptance, E,TB, VU by MD at 12/14/2020 1437                   Point: Precautions (Done)     Learning Progress Summary           Patient Acceptance, E, VU by GÓMEZ at 12/14/2020 2156    Acceptance, E,TB, VU by MD at 12/14/2020 1437                               User Key     Initials Effective Dates Name Provider Type Discipline     04/03/18 -  Kyalee Kurtz, PT Physical Therapist PT    MD 11/11/19 -  Edouard Hyatt, RN Registered Nurse Nurse     09/18/20 -  Danielle Spangler, VERNON Registered Nurse Nurse              PT  Recommendation and Plan  Planned Therapy Interventions (PT): strengthening, balance training, bed mobility training, transfer training, gait training, home exercise program, patient/family education  Plan of Care Reviewed With: patient  Progress: improving  Outcome Summary: Patient demonstrates improving strength as seen by his ability to perform therapueic exercises x 2 sets of 10.  He stated he had a long day and did not want to walk again.  Patient is expected to benefit from additional PT services.     Time Calculation:   PT Charges     Row Name 12/15/20 7747             Time Calculation    Start Time  1512  -      Stop Time  1537  -      Time Calculation (min)  25 min  -JR      PT Received On  12/15/20  -      PT Goal Re-Cert Due Date  12/24/20  -        User Key  (r) = Recorded By, (t) = Taken By, (c) = Cosigned By    Initials Name Provider Type    Kaylee Lemos, PT Physical Therapist        Therapy Charges for Today     Code Description Service Date Service Provider Modifiers Qty    67837528999 HC PT EVAL LOW COMPLEXITY 3 12/14/2020 Kaylee Kurtz, PT GP 1    [id number]  PT THER PROC EA 15 MIN 12/15/2020 Kaylee Kurtz, PT GP 2          PT G-Codes  Outcome Measure Options: AM-PAC 6 Clicks Basic Mobility (PT)  AM-PAC 6 Clicks Score (PT): 17  AM-PAC 6 Clicks Score (OT): 22    Kaylee Kurtz PT  12/15/2020

## 2020-12-15 NOTE — PROGRESS NOTES
Adult Nutrition  Assessment/PES    Patient Name:  Jani Pena  YOB: 1950  MRN: [id number]  Admit Date:  12/13/2020    Assessment Date:  12/15/2020    Comments:    Recommend:  1. Consider adding renal and consistent carbohydrate modifications to current diet order as medically appropriate and tolerated.  2. Encourage PO intake. PO intake average ~87% x 2 meals.  3. Consider a renal multivitamin with minerals daily.     RD to follow pt and available PRN.      Reason for Assessment     Row Name 12/15/20 1617          Reason for Assessment    Reason For Assessment  follow-up protocol     Diagnosis  cardiac disease;diabetes diagnosis/complications;infection/sepsis;renal disease;other (see comments);gastrointestinal disease Sepsis, UTI, ARTIS, COPD, GERD, CKD, AFIB, diverticulosis, BPH, CHF, HTN, HLD,     Identified At Risk by Screening Criteria  difficulty chewing/swallowing             Labs/Tests/Procedures/Meds     Row Name 12/15/20 1618          Labs/Procedures/Meds    Lab Results Reviewed  reviewed, pertinent     Lab Results Comments  High: BUN, Cr        Medications    Pertinent Medications Reviewed  reviewed, pertinent     Pertinent Medications Comments  Lipitor, Ferrous sulfate, Lasix, Remeron, Protonix, Miralax             Nutrition Prescription Ordered     Row Name 12/15/20 1619          Nutrition Prescription PO    Current PO Diet  Regular     Common Modifiers  GI Soft/Man         Evaluation of Received Nutrient/Fluid Intake     Row Name 12/15/20 1619          PO Evaluation    Number of Days PO Intake Evaluated  1 day     Number of Meals  2     % PO Intake  87               Problem/Interventions:  Problem 1     Row Name 12/15/20 1620          Nutrition Diagnoses Problem 1    Problem 1  Inadequate Nutrient Intake     Etiology (related to)  MNT for Treatment/Condition     Signs/Symptoms (evidenced by)  NPO     Resolved?  Yes         Problem 2     Row Name 12/15/20 1620          Nutrition  Diagnoses Problem 2    Problem 2  Altered Nutrition Related to Labs     Etiology (related to)  Medical Diagnosis     Endocrine  DM     Renal  CKD;ARTIS     Signs/Symptoms (evidenced by)  Biochemical     Specific Labs Noted  BUN;Creatinine             Intervention Goal     Row Name 12/15/20 1620          Intervention Goal    General  Meet nutritional needs for age/condition;Improved nutrition related lab(s)     PO  Meet estimated needs;Maintain intake;PO intake (%)     PO Intake %  -- 75 - 100%     Weight  Maintain weight         Nutrition Intervention     Row Name 12/15/20 1620          Nutrition Intervention    RD/Tech Action  Follow Tx progress;Recommend/ordered     Recommended/Ordered  Diet         Nutrition Prescription     Row Name 12/15/20 1620          Nutrition Prescription PO    PO Prescription  Begin/change diet     Begin/Change Diet to  Regular     Common Modifiers  GI Soft/[place];Renal;Consistent Carbohydrate     New PO Prescription Ordered?  No, recommended        Other Orders    Obtain Weight  Daily     Obtain Weight Ordered?  No, recommended     Supplement  Vitamin mineral supplement Renal     Supplement Ordered?  No, recommended         Education/Evaluation     Row Name 12/15/20 1627          Education    Education  Education not appropriate at this time     Please explain  Other (comment) Pt to discharge to assisted living facility        Monitor/Evaluation    Monitor  Per protocol;I&O;PO intake;Pertinent labs;Weight;Skin status           Electronically signed by:  Fabiana Jonas RD  12/15/20 16:21 EST

## 2020-12-15 NOTE — THERAPY TREATMENT NOTE
Patient Name: Jani Pena  : 1950    MRN: [id number]                              Today's Date: 12/15/2020       Admit Date: 2020    Visit Dx:     ICD-10-CM ICD-9-CM   1. Sepsis secondary to UTI (CMS/HCC)  A41.9 038.9    N39.0 995.91     599.0   2. Acute kidney insufficiency  N28.9 593.9     Patient Active Problem List   Diagnosis   • Anxiety   • Atrial fibrillation (CMS/HCC)   • Chronic kidney disease   • Steroid-dependent COPD (CMS/HCC)   • Degeneration of cervical intervertebral disc   • Gastroesophageal reflux disease   • Diverticulosis   • Hemorrhoids   • Hiatal hernia   • Hyperlipidemia   • Kyphosis, acquired   • PEDRO on CPAP   • Benign prostatic hyperplasia with incomplete bladder emptying   • Depression   • History of ventricular septal defect   • Chronic diastolic congestive heart failure (CMS/HCC)   • Coronary artery disease involving native coronary artery of native heart with angina pectoris (CMS/HCC)   • Lumbar radiculopathy   • Anemia   • Cardiac pacemaker in situ   • Sick sinus syndrome (CMS/HCC)   • Chronic respiratory failure with hypoxia and hypercapnia (CMS/HCC)   • Hematoma   • Flexural eczema   • Xerosis of skin   • Abnormal urinalysis   • Esophageal dysphagia   • Fall   • Presbyesophagus   • Sepsis secondary to UTI (CMS/HCC)     Past Medical History:   Diagnosis Date   • Abnormal liver enzymes    • Anemia    • Anxiety    • Arthritis    • Atherosclerotic heart disease of native coronary artery without angina pectoris    • Atrial fibrillation (CMS/HCC)    • Back pain    • BPH (benign prostatic hyperplasia)    • Cataract, bilateral    • Chest pain     2-3 MONTHS AGO.  PER PT, HAS ADDRESSED WITH CARDIOLOGIST.  STATES HAS NTG PRN.   • CHF (congestive heart failure) (CMS/HCC)    • Chronic bronchitis (CMS/HCC)    • Chronic kidney disease    • COPD (chronic obstructive pulmonary disease) (CMS/HCC)    • Degeneration of cervical intervertebral disc    • Depression    • Diverticulosis     • Dyspnea    • Esophageal reflux    • Esophagitis    • Gastritis    • Hematuria    • Hemorrhoids    • Hiatal hernia    • History of arthritis    • History of nuclear stress test     UNSURE OF DATE   • History of peripheral neuropathy    • History of ventricular septal defect    • History of ventricular septal defect    • Hyperlipidemia    • Hypertension    • Impaired functional mobility, balance, gait, and endurance    • Infectious diarrhea    • Kyphosis, acquired    • Lumbar radiculopathy    • Mitral and aortic valve disease    • Nephrolithiasis    • Phimosis    • Problems with swallowing     WITH FOOD   • Respiratory failure (CMS/HCC)    • Sleep apnea     BIPAP, too awkward to bring   • TIA (transient ischemic attack)     X3.   2014   • Ventral hernia    • Wears glasses     FOR READING     Past Surgical History:   Procedure Laterality Date   • CARDIAC CATHETERIZATION     • CARDIAC CATHETERIZATION N/A 5/24/2018    Procedure: Left Heart Cath;  Surgeon: Edouard Robertson MD;  Location:  [facility];  Service: Cardiovascular   • CARDIAC ELECTROPHYSIOLOGY PROCEDURE N/A 1/31/2019    Procedure: PACEMAKER IMPLANTATION- DC;  Surgeon: Omar Encinas DO;  Location:  [facility];  Service: Cardiology   • COLONOSCOPY N/A 12/28/2019    Procedure: COLONOSCOPY WITH HOT POLYPECTOMY;  Surgeon: Celio Maldonado MD;  [facility]:  [facility];  Service: Gastroenterology   • ENDOSCOPY N/A 1/26/2019    Procedure: ESOPHAGOGASTRODUODENOSCOPY;  Surgeon: Brunner, Mark I, MD;  Location:  [facility];  Service: Gastroenterology   • ENDOSCOPY N/A 12/27/2019    Procedure: ESOPHAGOGASTRODUODENOSCOPY;  Surgeon: Valdemar Mcdonald MD;  Location: [facility] ENDOSCOPY;  Service: General   • ENDOSCOPY N/A 10/20/2020    Procedure: ESOPHAGOGASTRODUODENOSCOPY WITH DILATION AND COLD FORCEP BIOPSY;  Surgeon: Cale Madrid MD;  Location: [facility] ENDOSCOPY;  Service: Gastroenterology;  Laterality: N/A;   • HERNIA  REPAIR     • ORTHOPEDIC SURGERY Left     Left hip arthroplasty   • PACEMAKER IMPLANTATION  01/31/2019   • SUPRAPUBIC CATHETER INSERTION      History of Percutaneous Catheter Placement Into Ureter   • THROAT SURGERY      FOR SLEEP APNEA   • VSD REPAIR      History of VSD Repair By Patch Closure     General Information     [facility] Name 12/15/20 1519          OT Time and Intention    Document Type  therapy note (daily note)  -     Mode of Treatment  occupational therapy  -       User Key  (r) = Recorded By, (t) = Taken By, (c) = Cosigned By    Initials Name Provider Type     Akilah Gutierrze Occupational Therapist          Mobility/ADL's     [facility] Name 12/15/20 1519          Bed Mobility    Supine-Sit Mobile (Bed Mobility)  supervision  -     Assistive Device (Bed Mobility)  head of bed elevated  -AH     Row Name 12/15/20 1519          Transfers    Transfers  sit-stand transfer  -     Comment (Transfers)  from bed, pt needed min assist as he was off balance.  Pt corrected balance and was able to stand from chair independently  -     Sit-Stand Mobile (Transfers)  contact guard;minimum assist (75% patient effort)  -AH     Row Name 12/15/20 1519          Functional Mobility    Functional Mobility- Ind. Level  contact guard assist  -     Functional Mobility-Distance (Feet)  20  -     Functional Mobility- Safety Issues  supplemental O2  -[facility] Name 12/15/20 1519          Lower Body Dressing Assessment/Training    Mobile Level (Lower Body Dressing)  don;doff;socks;supervision  -AH     Row Name 12/15/20 1519          Toileting Assessment/Training    Mobile Level (Toileting)  independent  -     Assistive Devices (Toileting)  urinal  -     Position (Toileting)  unsupported standing  -       User Key  (r) = Recorded By, (t) = Taken By, (c) = Cosigned By    Initials Name Provider Type     Akilah Gutierrez Occupational Therapist        Obj/Interventions    No documentation.        Goals/Plan    No documentation.       Clinical Impression     [facility] Name 12/15/20 1528          Pain Scale: Numbers Pre/Post-Treatment    Pretreatment Pain Rating  5/10  -     Posttreatment Pain Rating  5/10  -     Pain Location  abdomen  -     Pain Intervention(s)  Repositioned;Ambulation/increased activity  -     Row Name 12/15/20 1528          Plan of Care Review    Plan of Care Reviewed With  patient  -     Progress  improving  -     Outcome Summary  Pt able to sit eob with supervision, stood with cga/min assist from bed and walked 20' to his chair.  Pt independently stood from his chair to use his urinal.  Pt was left standing in his room with urinal.  -[facility] Name 12/15/20 1528          Vital Signs    O2 Delivery Pre Treatment  supplemental O2  -     O2 Delivery Intra Treatment  supplemental O2  -     O2 Delivery Post Treatment  supplemental O2  -[facility] Name 12/15/20 1528          Positioning and Restraints    Pre-Treatment Position  in bed  -     Post Treatment Position  other pt standing in room  -     Other Position  -- standing in room  -       User Key  (r) = Recorded By, (t) = Taken By, (c) = Cosigned By    Initials Name Provider Type    Akilah Del Angel Occupational Therapist        Outcome Measures     Row Name 12/15/20 1536          How much help from another is currently needed...    Putting on and taking off regular lower body clothing?  3  -AH     Bathing (including washing, rinsing, and drying)  3  -AH     Toileting (which includes using toilet bed pan or urinal)  4  -AH     Putting on and taking off regular upper body clothing  4  -AH     Taking care of personal grooming (such as brushing teeth)  4  -AH     Eating meals  4  -AH     AM-PAC 6 Clicks Score (OT)  22  -       User Key  (r) = Recorded By, (t) = Taken By, (c) = Cosigned By    Initials Name Provider Type    Akilah Del Angel Occupational Therapist        Occupational Therapy Education                  Title: PT OT SLP Therapies (Done)     Topic: Occupational Therapy (Done)     Point: ADL training (Done)     Description:   Instruct learner(s) on proper safety adaptation and remediation techniques during self care or transfers.   Instruct in proper use of assistive devices.              Learning Progress Summary           Patient Acceptance, E,TB, VU by  at 12/15/2020 1538    Comment: benefit of therapy to improve functional strength and endurance    Acceptance, E, VU by GÓMEZ at 12/14/2020 2156    Acceptance, E,TB, VU by MD at 12/14/2020 1437    Acceptance, E,TB, VU by  at 12/14/2020 1245    Comment: Role of OT/POC                   Point: Home exercise program (Done)     Description:   Instruct learner(s) on appropriate technique for monitoring, assisting and/or progressing therapeutic exercises/activities.              Learning Progress Summary           Patient Acceptance, E,TB, VU by  at 12/15/2020 1538    Comment: benefit of therapy to improve functional strength and endurance    Acceptance, E, VU by GÓMEZ at 12/14/2020 2156    Acceptance, E,TB, VU by MD at 12/14/2020 1437                   Point: Precautions (Done)     Description:   Instruct learner(s) on prescribed precautions during self-care and functional transfers.              Learning Progress Summary           Patient Acceptance, E, VU by GÓMEZ at 12/14/2020 2156    Acceptance, E,TB, VU by MD at 12/14/2020 1437                   Point: Body mechanics (Done)     Description:   Instruct learner(s) on proper positioning and spine alignment during self-care, functional mobility activities and/or exercises.              Learning Progress Summary           Patient Acceptance, E, VU by  at 12/14/2020 2156    Acceptance, E,TB, VU by MD at 12/14/2020 1437                               User Key     Initials Effective Dates Name Provider Type Discipline     03/07/18 -  Akilah Gutierrez Occupational Therapist OT    MD 11/11/19 -  Edouard Hyatt RN  Registered Nurse Nurse    GÓMEZ 09/18/20 -  Danielle Spangler RN Registered Nurse Nurse              OT Recommendation and Plan  Planned Therapy Interventions (OT): BADL retraining, activity tolerance training, strengthening exercise, transfer/mobility retraining  Therapy Frequency (OT): 3 times/wk  Plan of Care Review  Plan of Care Reviewed With: patient  Progress: improving  Outcome Summary: Pt able to sit eob with supervision, stood with cga/min assist from bed and walked 20' to his chair.  Pt independently stood from his chair to use his urinal.  Pt was left standing in his room with urinal.     Time Calculation:   Time Calculation- OT     Row Name 12/15/20 1540             Time Calculation- OT    OT Start Time  1447  -      OT Stop Time  1504  -      OT Time Calculation (min)  17 min  -      OT Received On  12/15/20  -      OT Goal Re-Cert Due Date  12/24/20  -         Timed Charges    22330 - OT Therapeutic Activity Minutes  17  -        User Key  (r) = Recorded By, (t) = Taken By, (c) = Cosigned By    Initials Name Provider Type     Akilah Gutierrez Occupational Therapist        Therapy Charges for Today     Code Description Service Date Service Provider Modifiers Qty    23175188803 HC OT EVAL LOW COMPLEXITY 3 12/14/2020 Akilah Gutierrez 1    [id number]  OT THERAPEUTIC ACT EA 15 MIN 12/15/2020 Akilah Gutierrez 1               Akilah Gutierrez  12/15/2020

## 2020-12-15 NOTE — PLAN OF CARE
Goal Outcome Evaluation:  Plan of Care Reviewed With: patient  Progress: improving  Outcome Summary: Patient demonstrates improving strength as seen by his ability to perform therapueic exercises x 2 sets of 10.  He stated he had a long day and did not want to walk again.  Patient is expected to benefit from additional PT services.

## 2020-12-15 NOTE — PLAN OF CARE
Goal Outcome Evaluation:  Plan of Care Reviewed With: patient  Progress: improving   HIDA scan this morning, no surgery consult at this time, patient pain managed well with PO pain meds, patient worked with PT today, anticipated discharge tomorrow, will continue to monitor and notify MD for any issues or concerns

## 2020-12-15 NOTE — PLAN OF CARE
Goal Outcome Evaluation:  Plan of Care Reviewed With: patient  Progress: improving  Outcome Summary: Pt able to sit eob with supervision, stood with cga/min assist from bed and walked 20' to his chair.  Pt independently stood from his chair to use his urinal.  Pt was left standing in his room with urinal.

## 2020-12-15 NOTE — PROGRESS NOTES
Continued Stay Note  [facility]     Patient Name: Jani Pena  MRN: [id number]  Today's Date: 12/15/2020    Admit Date: 12/13/2020    Discharge Plan     Row Name 12/15/20 1408       Plan    Plan  lm# with [facility] that a patient may be returning tomorrow to their facility        Discharge Codes    No documentation.             Audrey Inman RN

## 2020-12-15 NOTE — PROGRESS NOTES
[facility]    Name:  Jani Pena   Age:  70 y.o.  Sex:  male  :  1950  MRN:  [id number]   Visit Number:  [id number]  Admission Date:  2020  Date Of Service:  12/15/20  Primary Care Physician:  Miguel Rojas MD    Patient was seen and examined. Pertinent laboratory and radiology data were reviewed.  He is feeling better today and his right upper quadrant pain has improved compared to yesterday.  Denies any chest pain.  He did take a shower today.  He was seen by Dr. Madrid yesterday and he had ordered a HIDA scan this morning.    Vital signs:    Vital Signs (last 24 hours)        0700  -  12/15 0659 12/15 0700  -  12/15 1326   Most Recent    Temp (°F) 97.6 -  98.3    97.6 -  97.8     97.6 (36.4)    Heart Rate 66 -  90    70 -  81     80    Resp 16 -  18    18 -  20     18    /57 -  156/88    140/68 -  144/78     140/68    SpO2 (%) 90 -  98    93 -  98     98        Patient is comfortable lying down.  He does still have minimal tenderness in the right upper quadrant and right lower quadrant.  It seems improved compared to yesterday.  Breath sounds heard bilaterally without any significant wheezing or crackles.    Patient's renal function is improving.  He is on Rocephin and IV fluids.  Urine culture is growing gram-negative rods.  We will continue him on physical therapy.    HIDA scan seems to be within normal limits.  Hold off on surgical consultation at this time.  Hopefully, he should be able to go back to the assisted living facility tomorrow with oral antibiotics therapy for urinary tract infection.  Discussed with Dr. Madrid.  He recently did esophageal dilatation and EGD.  No indication for endoscopy at this time.  Agree with assessment and plan of PAL Witt.    Ferdinand Greer MD  12/15/20  13:26 EST    Dictated utilizing Dragon dictation.

## 2020-12-16 NOTE — PROGRESS NOTES
[facility]    Name:  Jani Pena   Age:  70 y.o.  Sex:  male  :  1950  MRN:  [id number]   Visit Number:  [id number]  Admission Date:  2020  Date Of Service:  20  Primary Care Physician:  Miguel Rojas MD    Patient was seen and examined. Pertinent laboratory and radiology data were reviewed.  He is walking around in the room without any difficulty.  His abdominal pain has improved.  On examination of the abdomen, tenderness in the right upper quadrant has significantly improved.  He still has minimal tenderness in the right lower quadrant.  I have advised the patient to take stool softeners to prevent from getting constipated.  Renal function has improved back to his baseline.    Vital signs:    Vital Signs (last 24 hours)       12/15 0700  -   0659  0700  -   1517   Most Recent    Temp (°F) 97.6 -  98.2    98 -  98.2     98 (36.7)    Heart Rate 65 -  88      84     84    Resp  -  18     18    /51 -  144/78    127/50 -  128/65     128/65    SpO2 (%) 92 -  98    92 -  97     92        Patient is currently feeling better and is being discharged back to [facility].  He declined home health services.    Patient does have a diagnosis of atrial fibrillation documented on the chart.  He follows up with Dr. Bass but has not been on any anticoagulation.  The exact reason for this is uncertain.  Patient is on aspirin and Plavix which will be continued.    He will be discharged on 5 more days of [employer] for his urinary tract infection.  He is growing Klebsiella in the urine culture and it is pansensitive.  Discussed and agree with the assessment and plan of PAL Flores.      Ferdinand Greer MD  20  15:17 EST    Dictated utilizing Dragon dictation.

## 2020-12-16 NOTE — DISCHARGE SUMMARY
[facility]   DISCHARGE SUMMARY      Name:  Jani Pena   Age:  70 y.o.  Sex:  male  :  1950  MRN:  [id number]   Visit Number:  [id number]    Admission Date:  2020  Date of Discharge:  2020  Primary Care Physician:  Miguel Rojas MD    Discharge Diagnoses:     1.  Acute urinary tract infection secondary to Klebsiella, present on admission, improving.  2.  Acute renal failure on chronic kidney disease stage III, present on admission, improved.  3.  Abdominal pain likely secondary to constipation, present on admission, improved.  4.  Cholelithiasis noted on CT scan, asymptomatic.  5.  Chronic respiratory failure with hypoxia and hypercapnia.  6.  COPD, no evidence of exacerbation.  7.  Sick sinus syndrome status post pacemaker placement.    Problem list.      Sepsis secondary to UTI (CMS/[facility])    Acute renal failure (ARF) (CMS/[facility])    Atrial fibrillation (CMS/HCC)    Chronic kidney disease    Degeneration of cervical intervertebral disc    Gastroesophageal reflux disease    Diverticulosis    Chronic respiratory failure with hypoxia and hypercapnia (CMS/[facility])    Presenting Problem:    Sepsis secondary to UTI (CMS/[facility]) [A41.9, N39.0]     Consults:     Consulting Physician(s)     Provider Relationship Specialty    Cale Madrid MD Consulting Physician Gastroenterology        Procedures Performed:     None.    History of presenting illness/ Hospital Course    70 year old male presented to our ED with abdominal pain and nausea as well as low grade fever.  Complained of pain for the past few days made worse by eating.  Patient also had complaints of constipation.  CT imaging revealed cystitis with pernephric stranding and stools in colon without obstruction.  Elevated creatinine noted at admission slightly elevated from baseline.  Patient was found to have UTI and started on Rocephin and IVF on admission.  Urine cultures were obtained.    Patient's kidney  function with improvement after gentle rehydration. ARTIS currently resolved. Patient did have complaints of right sided abdominal pain on admission.  Right upper abdominal pain worse than right lower abdominal pain.  Patient had US which revealed 8mm nonobstructing stone in gallbladder.  HIDA scan was also obtained with normal functioning noted.  Dr. Madrid was consulted during this admission as patient has recent history of esophagus stretching in October 2020.  Patient's abdominal pain did markedly improve during hospital course.  Patient complaining of minor upper pain today on assessment.  Urine cultures were sensitive to Ceftriaxone and patient will be discharged home on oral Omnicef to complete UTI treatment.  Blood cultures obtained on admission and remain with no growth.  Patient with no other complaints noted during admission.    Patient has improved and is ready for discharge today.  Patient will discharge today back to [facility] where he is a resident.  Patient agreeable and ready for discharge today.  Will continue home oxygen therapy for advanced COPD which is patient's baseline as well as home medications. Would like patient to follow-up with PCP in 1-2 weeks for a recheck.    Vital Signs:    Temp:  [97.8 °F (36.6 °C)-98.2 °F (36.8 °C)] 98 °F (36.7 °C)  Heart Rate:  [65-88] 84  Resp:  [16-18] 18  BP: (104-142)/(50-65) 128/65    Physical Exam:    General Appearance:  Alert and cooperative, not in any acute distress, resting in bed on evaluation.   Head:  Atraumatic and normocephalic, without obvious abnormality.   Eyes:          PERRLA, conjunctivae and sclerae normal, no icterus. No pallor. Extraocular movements are within normal limits.   Ears:  Ears appear intact with no abnormalities noted.   Throat: No oral lesions, no thrush, oral mucosa moist.   Neck: Supple, trachea midline   Back:   No kyphoscoliosis present. No tenderness to palpation,   range of motion normal.   Lungs:   Chest  shape is normal. Breath sounds heard bilaterally equally.  No crackles or wheezing. No Pleural rub or bronchial breathing.   Heart:  RRR, no murmur, no gallop, no rub. No JVD.  Pacemaker is palpated on the right upper chest.   Abdomen:   Normal bowel sounds, no masses, no organomegaly. Soft, mild right upper quadrant pain on palpation, nondistended, no guarding, no rebound tenderness.   Extremities: Moves all extremities, no edema, no cyanosis, no clubbing.   Pulses: Pulses palpable and equal bilaterally.   Skin: No bleeding, bruising or rash.   Neurologic: Alert and oriented x 3. Moves all four limbs equally. No tremors. No facial asymmetry.     Pertinent Lab Results:     Results from last 7 days   Lab Units 12/16/20  0606 12/15/20  0619 12/14/20  0532 12/13/20  1948   SODIUM mmol/L 142 142 140 141   POTASSIUM mmol/L 4.2 4.5 5.5* 4.9   CHLORIDE mmol/L 100 101 101 96*   CO2 mmol/L 34.6* 33.5* 28.4 35.8*   BUN mg/dL 45* 54* 50* 44*   CREATININE mg/dL 1.53* 1.69* 1.92* 2.26*   CALCIUM mg/dL 9.2 9.2 9.3 9.7   BILIRUBIN mg/dL 0.6  --  0.6 1.0   ALK PHOS U/L 51  --  62 80   ALT (SGPT) U/L 6  --  7 8   AST (SGOT) U/L 13  --  13 15   GLUCOSE mg/dL 91 99 162* 130*     Results from last 7 days   Lab Units 12/16/20  0606 12/15/20  0619 12/14/20  0532   WBC 10*3/mm3 7.18 11.41* 15.76*   HEMOGLOBIN g/dL 11.8* 11.6* 11.8*   HEMATOCRIT % 38.5 37.5 37.8   PLATELETS 10*3/mm3 204 195 170         Results from last 7 days   Lab Units 12/15/20  0619 12/13/20  1948   TROPONIN T ng/mL 0.026 0.040*     Results from last 7 days   Lab Units 12/13/20 1948   PROBNP pg/mL 5,898.0*         Results from last 7 days   Lab Units 12/16/20  0606   LIPASE U/L 38         Results from last 7 days   Lab Units 12/13/20  2137   COLOR UA  Yellow   GLUCOSE UA  Negative   KETONES UA  Negative   LEUKOCYTES UA  Moderate (2+)*   PH, URINE  <=5.0   BILIRUBIN UA  Negative   UROBILINOGEN UA  0.2 E.U./dL     Pain Management Panel     Pain Management Panel Latest  Ref Rng & Units 9/1/2019 8/29/2019    CREATININE UR mg/dL 90.1 -    AMPHETAMINES SCREEN, URINE Negative - Negative    BARBITURATES SCREEN Negative - Negative    BENZODIAZEPINE SCREEN, URINE Negative - Positive(A)    BUPRENORPHINEUR Negative - Negative    COCAINE SCREEN, URINE Negative - Negative    METHADONE SCREEN, URINE Negative - Negative    METHAMPHETAMINEUR Negative - Negative        Results from last 7 days   Lab Units 12/13/20 2137 12/13/20 2019 12/13/20 2015   BLOODCX   --  No growth at 2 days No growth at 2 days   URINECX  >100,000 CFU/mL Klebsiella oxytoca*  --   --        Pertinent Radiology Results:    Imaging Results (All)     Procedure Component Value Units Date/Time    NM Hepatobiliary Without CCK [[id number]] Collected: 12/15/20 1226     Updated: 12/15/20 1229    Narrative:      PROCEDURE: NM HEPATOBILIARY WITHOUT CCK-     HISTORY: RUQ pain, fever, stone neck of the GB to ruleout cholecystitis;  A41.9-Sepsis, unspecified organism; N39.0-Urinary tract infection, site  not specified; N28.9-Disorder of kidney and ureter, unspecified     Technique: Following intravenous administration of approximately 6.3 mCi  of TC Choletec, multiple scintigraphic images were obtained.     FINDINGS: The hepatic parenchymal phase shows homogeneous distribution  of tracer throughout the liver. Prompt appearance of tracer is noted in  the intrahepatic and extrahepatic biliary systems. The gallbladder  visualizes normally. Biliary to bowel transit is within normal limits.       Impression:      Normal visualization of activity within biliary tree,  gallbladder and drainage into small bowel.     This report was finalized on 12/15/2020 12:27 PM by Terrance Cheema MD.    US Gallbladder [[id number]] Collected: 12/14/20 1332     Updated: 12/14/20 1335    Narrative:      PROCEDURE: US GALLBLADDER-     HISTORY: RUQ pain; A41.9-Sepsis, unspecified organism; N39.0-Urinary  tract infection, site not specified; N28.9-Disorder of kidney  and  ureter, unspecified     FINDINGS: An 8 mm stone is noted within the noted within the neck of the  gallbladder. No gallbladder wall thickening or distention is present. No  fluid collections are seen.     There is no biliary ductal dilatation. No free fluid is seen. Limited  portions of the right kidney and right liver are unremarkable.       Impression:      Cholelithiasis. No associated biliary obstruction.        This report was finalized on 12/14/2020 1:33 PM by Terrance Cheema MD.    CT Abdomen Pelvis Without Contrast [[id number]] Collected: 12/13/20 2159     Updated: 12/13/20 2200    Narrative:      FINAL REPORT    CLINICAL HISTORY:  abdominal pain, fever    FINDINGS:  Multiple contiguous transaxial slices through the abdomen and  pelvis were obtained without the intravenous ministration of  contrast with coronal reformatted images.  There is bilateral  atelectasis versus scarring.  The liver is heterogeneous.  There  is a small gallstone without gallbladder wall thickening.  The  pancreas, spleen and adrenal glands are unremarkable.  Right  renal vascular calcification without evidence for obstructive  uropathy.  Nonspecific bilateral perinephric fat stranding.  The  bowel gas pattern is nonobstructive.  There is diverticulosis  without diverticulitis.  No evidence for appendicitis.  Moderate  stool is present.  The bladder wall is thickened, nonspecific.  There is streak artifact from left hip prosthesis.  Aorta and  iliac arteries are calcified.      Impression:      Nonspecific bladder wall thickening, cannot exclude cystitis  diverticulosis without diverticulitis  cholelithiasis    Authenticated by Geovany Mullins MD on 12/13/2020 09:59:14 PM    XR Chest 1 View [[id number]] Collected: 12/13/20 2157     Updated: 12/13/20 2157    Narrative:      FINAL REPORT    CLINICAL HISTORY:  soa    FINDINGS:  Interstitial prominence is consistent with chronic lung disease.  There is nonspecific bilateral lower lobe  airspace disease.  The  heart is enlarged.  Aortic knob is calcified.  Pacemaker and  neural stimulators are present.      Impression:      Chronic changes with bilateral airspace disease    Authenticated by Geovany Mullins MD on 12/13/2020 09:57:00 PM        Echo:    Results for orders placed during the hospital encounter of 01/20/19   Adult Transthoracic Echo Complete W/ Cont if Necessary Per Protocol    Narrative Technically difficult study   1) Borderline LVH , moderate dilation of LV with mild reduction systolic   function ( EF is about 45%)  2) Moderate left atrial enlargement with normal LVedp  3) Aortic sclerosis with mild AI   4) Mild MR   5) Mild TR with PAsp of 48 mm of hg   6) Mild dilation of the RV with normal RV Function   7) Severe hypokinesis of the inferior and septal walls      Condition on Discharge:      Stable.    Code status during the hospital stay:    Code Status and Medical Interventions:   Ordered at: 12/14/20 0111     Level Of Support Discussed With:    Patient     Code Status:    No CPR     Medical Interventions (Level of Support Prior to Arrest):    Full     Discharge Disposition:    Home or Self Care    Discharge Medications:       Discharge Medications      New Medications      Instructions Start Date   cefdinir 300 MG capsule  Commonly known as: OMNICEF   300 mg, Oral, 2 Times Daily         Changes to Medications      Instructions Start Date   mirtazapine 15 MG tablet  Commonly known as: REMERON  What changed: when to take this   TAKE 1 TABLET BY MOUTH DAILY AT BEDTIME      promethazine 25 MG tablet  Commonly known as: PHENERGAN  What changed: when to take this   TAKE 1 TABLET BY MOUTH EVERY 6 HOURS AS NEEDED FOR NAUSEA AND VOMITING      traZODone 50 MG tablet  Commonly known as: DESYREL  What changed: when to take this   TAKE 1 TABLET BY MOUTH EVERY EVENING         Continue These Medications      Instructions Start Date   acetaminophen 500 MG tablet  Commonly known as: TYLENOL    500 mg, Oral, Every 6 Hours PRN      alfuzosin 10 MG 24 hr tablet  Commonly known as: UROXATRAL   @@ TAKE ONE TABLET BY MOUTH DAILY      amLODIPine 5 MG tablet  Commonly known as: NORVASC   TAKE ONE TABLET BY MOUTH DAILY      Aspirin Low Dose 81 MG chewable tablet  Generic drug: aspirin   @@ TAKE ONE TABLET BY MOUTH DAILY      atorvastatin 10 MG tablet  Commonly known as: LIPITOR   10 mg, Oral, Every Evening      benzonatate 200 MG capsule  Commonly known as: TESSALON   TAKE ONE CAPSULE BY MOUTH THREE TIMES A DAY AS NEEDED FOR COUGH      carboxymethylcellulose 0.5 % solution  Commonly known as: REFRESH PLUS   1 drop, 3 Times Daily PRN      clopidogrel 75 MG tablet  Commonly known as: PLAVIX   TAKE ONE TABLET BY MOUTH DAILY      cyclobenzaprine 5 MG tablet  Commonly known as: FLEXERIL   5 mg, Oral, 3 Times Daily PRN      docusate sodium 100 MG capsule  Commonly known as: COLACE   100 mg, Oral, 2 Times Daily PRN      escitalopram 20 MG tablet  Commonly known as: LEXAPRO   20 mg, Oral, Daily      ferrous sulfate 325 (65 Fe) MG tablet   TAKE ONE TABLET BY MOUTH DAILY      finasteride 5 MG tablet  Commonly known as: PROSCAR   5 mg, Oral, Daily      fluocinonide 0.05 % cream  Commonly known as: LIDEX   Topical, 2 Times Daily      furosemide 20 MG tablet  Commonly known as: LASIX   TAKE ONE TABLET BY MOUTH DAILY      HYDROcodone-acetaminophen 5-325 MG per tablet  Commonly known as: NORCO   TAKE 1 TABLET BY MOUTH EVERY 8 HOURS AS NEEDED FOR SEVERE PAIN      hydrocortisone-eucerin   Topical      ipratropium 0.06 % nasal spray  Commonly known as: Atrovent   2 sprays, Nasal, 4 Times Daily      ipratropium-albuterol 0.5-2.5 mg/3 ml nebulizer  Commonly known as: DUO-NEB   3 mL, Nebulization, Every 4 Hours PRN      lidocaine 5 %  Commonly known as: LIDODERM   PLACE ONE PATCH ON SKIN AS DIRECTED,REMOVE & DISCARD PATCH WITHIN 12 HOURS      magnesium hydroxide 400 MG/5ML suspension  Commonly known as: MILK OF MAGNESIA   15 mL, Oral,  Daily PRN      melatonin 5 MG tablet tablet   @@ TAKE 1 TABLET BY MOUTH EVERY EVENING      O2  Commonly known as: OXYGEN   4 L/min, Inhalation, Once, Walking on 4L and sitting 3L      oxybutynin 5 MG tablet  Commonly known as: DITROPAN   OAKE 1 TABLET BY MOUTH DAILY AT BEDTIME      pantoprazole 40 MG EC tablet  Commonly known as: PROTONIX   TAKE ONE TABLET BY MOUTH DAILY      polyethylene glycol 17 g packet  Commonly known as: MIRALAX   17 g, Oral, Daily      potassium chloride 10 MEQ CR tablet   10 mEq, Oral, Daily      predniSONE 10 MG tablet  Commonly known as: DELTASONE   10 mg, Oral, Daily      Trelegy Ellipta 100-62.5-25 MCG/INH aerosol powder   Generic drug: Fluticasone-Umeclidin-Vilant   1 puff, Inhalation, Daily           Discharge Diet: As tolerated    Diet Instructions     Advance Diet As Tolerated            Activity at Discharge:    Activity Instructions     Activity as Tolerated            Follow-up Appointments:    Additional Instructions for the Follow-ups that You Need to Schedule     Ambulatory Referral to Home Health   As directed      Face to Face Visit Date: 12/16/2020    Follow-up provider for Plan of Care?: I treated the patient in an acute care facility and will not continue treatment after discharge.    Follow-up provider: MIGUEL ROJAS [[id number]]    Reason/Clinical Findings: Urosepsis    Describe mobility limitations that make leaving home difficult: On chronic home O2, in assisted living    Nursing/Therapeutic Services Requested: Physical Therapy Occupational Therapy    PT orders: Therapeutic exercise Strengthening Home safety assessment    Occupational orders: Activities of daily living Energy conservation Strengthening Home safety assessment    Frequency: 1 Week 1           Follow-up Information     Miguel Rojas MD Follow up on 12/22/2020.    Specialty: Internal Medicine  Why: @1:15pm video visit.  Contact information:  [address] [place] [address] [phone]                  Future Appointments   Date Time Provider Department Center   12/22/2020  1:15 PM Miguel Rojas MD [facility]   1/12/2021 11:00 AM Miguel Rojas MD [facility]   3/1/2021 11:30 AM Janki Lozano APRN [facility] None   4/5/2021 11:15 AM Slim Toribio MD [facility] None   5/5/2021 10:45 AM Ulysses Bass MD [facility] None     Test Results Pending at Discharge:    Pending Labs     Order Current Status    Green Top (No Gel) In process    [place] Draw In process    Blood Culture - Blood, Arm, Left Preliminary result    Blood Culture - Blood, Hand, Left Preliminary result        Blood cultures have been negative so far.     Ferdinand Greer MD  12/16/20  15:12 EST    Time: I spent 28 minutes on this discharge activity which included: face-to-face encounter with the patient, reviewing the data in the system, coordination of the care with the nursing staff as well as consultants, documentation, and entering orders.     Dictated utilizing Dragon dictation.

## 2020-12-16 NOTE — PROGRESS NOTES
Continued Stay Note  [facility]     Patient Name: Jani Pena  MRN: [id number]  Today's Date: 12/16/2020    Admit Date: 12/13/2020    Discharge Plan     Row Name 12/16/20 1452       Plan    Plan  Discharge planning, spoke to patient and [facility]. Patient has [facility] back to [facility] according to him.        Discharge Codes    No documentation.       Expected Discharge Date and Time     Expected Discharge Date Expected Discharge Time    Dec 16, 2020             Deana Jarvis LCSW

## 2020-12-16 NOTE — OUTREACH NOTE
Prep Survey      Responses   [facility] patient discharged from?  [place]   Is LACE score < 7 ?  No   Eligibility  [facility]   Date of Admission  12/13/20   Date of Discharge  12/16/20   Discharge Disposition  Home or Self Care   Discharge diagnosis  Sepsis secondary to UTI    Does the patient have one of the following disease processes/diagnoses(primary or secondary)?  Sepsis   Does the patient have Home health ordered?  Yes   What is the Home health agency?   home health orders to [facility] nurse for their in house rehab services   Is there a DME ordered?  Yes   What DME was ordered?  [employer] - O[facility]   Prep survey completed?  Yes          Kaylee Brown RN

## 2020-12-17 NOTE — OUTREACH NOTE
Call Center TCM Note      Responses   [facility] patient discharged from?  [place]   Does the patient have one of the following disease processes/diagnoses(primary or secondary)?  Sepsis   TCM attempt successful?  No   Unsuccessful attempts  Attempt 2          Tomasa Khan MA    12/17/2020, 16:58 EST

## 2020-12-17 NOTE — PROGRESS NOTES
Case Management Discharge Note           Provided Post Acute Provider List?: N/A  Provided Post Acute Provider Quality & Resource List?: N/A    Selected Continued Care - Discharged on 12/16/2020 Admission date: 12/13/2020 - Discharge disposition: Home or Self Care    Destination    No services have been selected for the patient.              Durable Medical Equipment    No services have been selected for the patient.              Dialysis/Infusion    No services have been selected for the patient.              Home Medical Care    No services have been selected for the patient.              Therapy    No services have been selected for the patient.              Community Resources    No services have been selected for the patient.                       Final Discharge Disposition Code: 01 - home or self-care

## 2020-12-17 NOTE — OUTREACH NOTE
Call Center TCM Note      Responses   [facility] patient discharged from?  [place]   Does the patient have one of the following disease processes/diagnoses(primary or secondary)?  Sepsis   TCM attempt successful?  No   Unsuccessful attempts  Attempt 1   Call Status  Left message          Tomasa Khan MA    12/17/2020, 12:16 EST

## 2020-12-18 NOTE — OUTREACH NOTE
Call Center TCM Note      Responses   [facility] patient discharged from?  [place]   Does the patient have one of the following disease processes/diagnoses(primary or secondary)?  Sepsis   TCM attempt successful?  No   Unsuccessful attempts  Attempt 3          Maria Luisa Ramesh RN    12/18/2020, 14:58 EST

## 2020-12-22 NOTE — ED PROVIDER NOTES
Subjective   70-year-old male presenting with shortness of breath.  States that for last couple days he has had increasing shortness of breath especially when he exerts himself.  Today on his home 4 L he is desaturated into the 60s with exertion.  He has had some increased cough.  He denies any fevers, chills, nausea, vomiting, chest pain, leg swelling.  He does live at the [facility], there has been a recent COVID-19 outbreak there.          Review of Systems   Constitutional: Negative.    HENT: Negative.    Eyes: Negative.    Respiratory: Positive for cough and shortness of breath.    Cardiovascular: Negative.    Gastrointestinal: Negative.    Genitourinary: Negative.    Musculoskeletal: Negative.    Skin: Negative.    Neurological: Negative.    Psychiatric/Behavioral: Negative.        Past Medical History:   Diagnosis Date   • Abnormal liver enzymes    • Anemia    • Anxiety    • Arthritis    • Atherosclerotic heart disease of native coronary artery without angina pectoris    • Atrial fibrillation (CMS/HCC)    • Back pain    • BPH (benign prostatic hyperplasia)    • Cataract, bilateral    • Chest pain     2-3 MONTHS AGO.  PER PT, HAS ADDRESSED WITH CARDIOLOGIST.  STATES HAS NTG PRN.   • CHF (congestive heart failure) (CMS/HCC)    • Chronic bronchitis (CMS/HCC)    • Chronic kidney disease    • COPD (chronic obstructive pulmonary disease) (CMS/HCC)    • Degeneration of cervical intervertebral disc    • Depression    • Diverticulosis    • Dyspnea    • Esophageal reflux    • Esophagitis    • Gastritis    • Hematuria    • Hemorrhoids    • Hiatal hernia    • History of arthritis    • History of nuclear stress test     UNSURE OF DATE   • History of peripheral neuropathy    • History of ventricular septal defect    • History of ventricular septal defect    • Hyperlipidemia    • Hypertension    • Impaired functional mobility, balance, gait, and endurance    • Infectious diarrhea    • Kyphosis, acquired    • Lumbar  radiculopathy    • Mitral and aortic valve disease    • Nephrolithiasis    • Phimosis    • Problems with swallowing     WITH FOOD   • Respiratory failure (CMS/HCC)    • Sleep apnea     BIPAP, too awkward to bring   • TIA (transient ischemic attack)     X3.   2014   • Ventral hernia    • Wears glasses     FOR READING       Allergies   Allergen Reactions   • Levaquin [Levofloxacin] Hives   • Sulfa Antibiotics Nausea And Vomiting       Past Surgical History:   Procedure Laterality Date   • CARDIAC CATHETERIZATION     • CARDIAC CATHETERIZATION N/A 5/24/2018    Procedure: Left Heart Cath;  Surgeon: Edouard Robertson MD;  Location:  [facility];  Service: Cardiovascular   • CARDIAC ELECTROPHYSIOLOGY PROCEDURE N/A 1/31/2019    Procedure: PACEMAKER IMPLANTATION- DC;  Surgeon: Omar Encinas DO;  Location:  [facility];  Service: Cardiology   • COLONOSCOPY N/A 12/28/2019    Procedure: COLONOSCOPY WITH HOT POLYPECTOMY;  Surgeon: Celio Maldonado MD;  Location:  [facility];  Service: Gastroenterology   • ENDOSCOPY N/A 1/26/2019    Procedure: ESOPHAGOGASTRODUODENOSCOPY;  Surgeon: Brunner, Mark I, MD;  Location:  [facility];  Service: Gastroenterology   • ENDOSCOPY N/A 12/27/2019    Procedure: ESOPHAGOGASTRODUODENOSCOPY;  Surgeon: Valdemar Mcdonald MD;  Location:  [facility];  Service: General   • ENDOSCOPY N/A 10/20/2020    Procedure: ESOPHAGOGASTRODUODENOSCOPY WITH DILATION AND COLD FORCEP BIOPSY;  Surgeon: Cale Madrid MD;  Location:  [facility];  Service: Gastroenterology;  Laterality: N/A;   • HERNIA REPAIR     • ORTHOPEDIC SURGERY Left     Left hip arthroplasty   • PACEMAKER IMPLANTATION  01/31/2019   • SUPRAPUBIC CATHETER INSERTION      History of Percutaneous Catheter Placement Into Ureter   • THROAT SURGERY      FOR SLEEP APNEA   • VSD REPAIR      History of VSD Repair By Patch Closure       Family History   Problem Relation Age of Onset   • Other Father          CABG   • Coronary artery disease Father    • Colon cancer Neg Hx        Social History     Socioeconomic History   • Marital status:      Spouse name: Not on file   • Number of children: Not on file   • Years of education: Not on file   • Highest education level: Not on file   Occupational History     Employer: RETIRED   Tobacco Use   • Smoking status: Former Smoker     Packs/day: 3.00     Years: 30.00     Pack years: 90.00     Types: Cigarettes     Quit date: 2017     Years since quitting: 3.9   • Smokeless tobacco: Never Used   Substance and Sexual Activity   • Alcohol use: No   • Drug use: No   • Sexual activity: Defer           Objective   Physical Exam  Vitals signs reviewed.   Constitutional:       General: He is not in acute distress.     Appearance: He is not toxic-appearing or diaphoretic.      Comments: Chronically ill-appearing   HENT:      Head: Normocephalic and atraumatic.      Right Ear: External ear normal.      Left Ear: External ear normal.      Nose: Nose normal.      Mouth/Throat:      Mouth: Mucous membranes are moist.      Pharynx: Oropharynx is clear.   Eyes:      Extraocular Movements: Extraocular movements intact.      Conjunctiva/sclera: Conjunctivae normal.      Pupils: Pupils are equal, round, and reactive to light.   Neck:      Musculoskeletal: Normal range of motion and neck supple.   Cardiovascular:      Rate and Rhythm: Normal rate and regular rhythm.      Pulses: Normal pulses.      Heart sounds: Normal heart sounds.   Pulmonary:      Effort: Pulmonary effort is normal. No respiratory distress.      Comments: Diffuse crackles  Abdominal:      General: Bowel sounds are normal. There is no distension.      Tenderness: There is no abdominal tenderness.   Musculoskeletal: Normal range of motion.         General: No swelling, tenderness or deformity.   Skin:     General: Skin is warm and dry.      Capillary Refill: Capillary refill takes less than 2 seconds.      Findings: No  rash.   Neurological:      General: No focal deficit present.      Mental Status: He is alert and oriented to person, place, and time.   Psychiatric:         Mood and Affect: Mood normal.         Behavior: Behavior normal.         Procedures           ED Course  ED Course as of Dec 22 2006   Tue Dec 22, 2020   1506 EKG interpreted by me: ventricularly paced rhythm, normal rate, no further interpretation, this is an abnormal EKG    [MP]   2000 Second troponin has returned and has not increased.  Will discharge the patient with treatment for exacerbation of COPD.    [DT]   2005 CT Chest Without Contrast  Order: [id number]  Status:  Final result   Visible to patient:  No (not released) Next appt:  12/29/2020 at 01:00 PM in Internal Medicine (Miguel Rojas MD)  Details      Reading Physician Reading Date Result Priority  Geovany Mullins MD  [phone] 12/22/2020     Narrative & Impression    FINAL REPORT     CLINICAL HISTORY:  sob, hypoxia     FINDINGS:  Buckle contiguous axial slices of the chest were obtained  without the intravenous ministration of contrast with coronal  reformatted images.  There is mild interstitial prominence  consistent with chronic lung disease with linear scarring.  There is no discrete infiltrate or effusion.  The heart is  enlarged.  There are calcifications of the coronary arteries.  Aorta is calcified and ectatic.  Upper abdomen demonstrates  layering gallstones.     IMPRESSION:  Chronic changes but no acute disease     Authenticated by Geovany Mullins MD on 12/22/2020 07:05:19 PM            [DT]   2005 Reviewing the chart it appears that Dr. Rojas called the of this patient in a Medrol Dosepak today.  Will not prescribe additional corticosteroids.    [DT]      ED Course User Index  [DT] Bigg Campos MD  [MP] Edouard Durán MD                                           MDM  Number of Diagnoses or Management Options  Diagnosis management comments: 70-year-old male with  increased shortness of breath and cough.  Chronically ill-appearing elderly man in no distress with exam as above.  His vital signs are normal here.  He does have coarse crackles throughout.  His exam is otherwise nonfocal.  Will obtain labs, EKG, chest x-ray, COVID-19 swab.  Will give symptomatic treatment.  Disposition pending.    DDx: COPD, pneumonia, viral illness including COVID-19, CHF    17:46 EST Labs thus far notable for mild renal insufficiency, elevated BNP and troponin.  Procalcitonin and lactic acid are both normal.  ABG with mild hypercapnia compared to previous.  Chest x-ray reveals chronic findings, will proceed with CT chest to further evaluate his lungs.  Disposition pending.       Amount and/or Complexity of Data Reviewed  Decide to obtain previous medical records or to obtain history from someone other than the patient: yes        Final diagnoses:   Acute exacerbation of chronic obstructive pulmonary disease (COPD) (CMS/[facility])            Bigg Campos MD  12/22/20 2006

## 2020-12-22 NOTE — PROGRESS NOTES
Transitional Care Follow Up Visit  Subjective     Lincolnnaty Pena is a 70 y.o. male who presents for a transitional care management visit.    Within 48 business hours after discharge our office contacted him via telephone to coordinate his care and needs.      I reviewed and discussed the details of that call along with the discharge summary, hospital problems, inpatient lab results, inpatient diagnostic studies, and consultation reports with Jani.     Current outpatient and discharge medications have been reconciled for the patient.  Reviewed by: Miguel Rojas MD      Date of TCM Phone Call 12/16/2020   [facility]   Date of Admission 12/13/2020   Date of Discharge 12/16/2020   Discharge Disposition Home or Self Care     Risk for Readmission (LACE) Score: 14 (12/16/2020  6:00 AM)      History of Present Illness video visit  Course During Hospital Stay:  Patient here for transitional care.  Patient was discharged from hospital 9 days ago for COPD exacerbation.  Today patient still has a significant short of breath.  Patient has history of end-stage lung disease emphysema.  Patient states oxygen saturation dropped to 70s today but after 4 L continuous PO2 oxygen is 88%.  Patient declines to go to hospital.  Denies any fever chill.  Patient has a baseline coughing yellow sputum.  Patient is on steroid nebulizer.  Recently finished Omnicef.  No taste or smell change.  No GI symptoms.     The following portions of the patient's history were reviewed and updated as appropriate: allergies, current medications, past family history, past medical history, past social history, past surgical history and problem list.    Review of Systems   Constitutional: Negative.  Negative for chills and fever.   Respiratory: Positive for cough, shortness of breath and wheezing.    Cardiovascular: Negative.    Gastrointestinal: Negative.    Musculoskeletal: Negative.    Skin: Negative.    Neurological: Negative.     Psychiatric/Behavioral: Negative.        Objective   Physical Exam  Constitutional:       Appearance: Normal appearance.   Pulmonary:      Comments: Baseline short of breath breathing effort  Neurological:      Mental Status: He is alert. Mental status is at baseline.   Psychiatric:         Mood and Affect: Mood normal.         Assessment/Plan   Diagnoses and all orders for this visit:    1. Chronic respiratory failure with hypoxia and hypercapnia (CMS/HCC) (Primary)  -     methylPREDNISolone (MEDROL) 4 MG dose pack; Take as directed on package instructions.  Dispense: 21 tablet; Refill: 0  -     azithromycin (Zithromax) 500 MG tablet; Take 1 tablet by mouth Daily.  Dispense: 5 tablet; Refill: 0      Continue cough medicine nebulizer.  To emergency room if worse.

## 2020-12-23 NOTE — TELEPHONE ENCOUNTER
CHAN BRICENO WITH [facility] IS REQUESTING A CALL BACK TO BE INSTRUCTED ON WHETHER TO GIVE PATIENT   methylPREDNISolone (MEDROL) 4 MG dose pack OR THE predniSONE (DELTASONE) 10 MG tablet THAT THE ER HAD PRESCRIBED    CHAN IS REQUESTING A CALL BACK BY END OF DAY IF POSSIBLE    PLEASE ADVISE    CALL BACK NUMBER [phone]

## 2020-12-23 NOTE — ED NOTES
Pt looking for ride back to [facility] at this time      Salma Castaneda, RN  12/22/20 2016

## 2020-12-23 NOTE — OUTREACH NOTE
Sepsis Week 2 Survey      Responses   [facility] patient discharged from?  [facility]   Does the patient have one of the following disease processes/diagnoses(primary or secondary)?  Sepsis   Week 2 attempt successful?  No   Unsuccessful attempts  Attempt 1          Suha Larios RN

## 2020-12-28 NOTE — OUTREACH NOTE
Sepsis Week 2 Survey      Responses   [facility] patient discharged from?  [facility]   Does the patient have one of the following disease processes/diagnoses(primary or secondary)?  Sepsis   Week 2 attempt successful?  Yes   Call start time  0910   Call end time  0922   Discharge diagnosis  Sepsis secondary to UTI    Is patient permission given to speak with other caregiver?  Yes   List who call center can speak with  Mother- who is 89   Meds reviewed with patient/caregiver?  Yes   Is the patient taking all medications as directed (includes completed medication regime)?  Yes   Does the patient have a primary care provider?   Yes   Does the patient have an appointment with their PCP within 7 days of discharge?  Yes   Has the patient kept scheduled appointments due by today?  N/A   Comments  PCP f/u appt 12-29 per pt report. Pt was in ER on 12-22 for SOA & O2 sats in 70's while on 4L O2   What is the Home health agency?   [facility]   What DME was ordered?  [employer] - O2   DME comments  Pt wears O2 continuously   Comments  Pt reports that he is no longer having issues w/ urinary symptoms. Pt c/o SOA, cough, O2 sats range from low 70's to 90's on 4L O2, he is now having diarrhea. He also c/o fatigue & body aching. He plans to discuss w/ PCP at appt tomorrow.    What is the patient's perception of their health status since discharge?  Same   Nursing interventions  Nurse provided patient education, Advised patient to call provider   Is the patient/caregiver able to teach back Sepsis?  S - Shivering,fever or very cold, S - Short of breath   Nursing interventions  Advised patient to call provider, Nurse provided reassurance to patient, Nurse provided patient education   Is patient/caregiver able to teach back steps to recovery at home?  Set small, achievable goals for return to baseline health, Rest and regain strength, Make a list of questions for PCP  appoinment   Is the patient/caregiver able to teach back signs and symptoms of worsening condition:  Rapid heart rate (>90), Fever, Shortness of breath/rapid respiratory rate   If the patient is a current smoker, are they able to teach back resources for cessation?  Not a smoker   Is the patient/caregiver able to teach back the hierarchy of who to call/visit for symptoms/problems? PCP, Specialist, Home health nurse, Urgent Care, ED, 911  Yes   Week 2 call completed?  Yes          Anna Redman RN

## 2020-12-29 NOTE — PROGRESS NOTES
Subjective   Jani Pena is a 70 y.o. male.     Chief Complaint   Patient presents with   • COPD   • Urinary Frequency     pt c/o constant off and on UTI's, started 3-4 days ago. c/o strong odor and frequent urination.        History of Present Illness   Patient here for follow-up of with end-stage of COPD.  Patient states last 2 months short of breath getting worse.  Patient had a multiple rounds of a steroid and antibiotics.  No significant changes.  Patient is on 4 L oxygen O2 sat today while resting 97%.  Patient states mild exertion causes severe short of breath.  Patient is also very nervous about smothering sensation.  No depression no suicidal thoughts.  Patient also complains urine foul smell.  Blood test that showed a creatinine elevated.    Current Outpatient Medications:   •  acetaminophen (TYLENOL) 500 MG tablet, Take 1 tablet by mouth Every 6 (Six) Hours As Needed for Mild Pain ., Disp: 45 tablet, Rfl: 1  •  alfuzosin (UROXATRAL) 10 MG 24 hr tablet, @@ TAKE ONE TABLET BY MOUTH DAILY, Disp: 90 tablet, Rfl: 3  •  amLODIPine (NORVASC) 5 MG tablet, TAKE ONE TABLET BY MOUTH DAILY, Disp: 90 tablet, Rfl: 3  •  Aspirin Low Dose 81 MG chewable tablet, @@ TAKE ONE TABLET BY MOUTH DAILY, Disp: 90 tablet, Rfl: 3  •  atorvastatin (LIPITOR) 10 MG tablet, Take 1 tablet by mouth Every Evening., Disp: 90 tablet, Rfl: 3  •  benzonatate (TESSALON) 200 MG capsule, TAKE ONE CAPSULE BY MOUTH THREE TIMES A DAY AS NEEDED FOR COUGH, Disp: 45 capsule, Rfl: 0  •  carboxymethylcellulose (REFRESH PLUS) 0.5 % solution, 1 drop 3 (Three) Times a Day As Needed for Dry Eyes., Disp: , Rfl:   •  clopidogrel (PLAVIX) 75 MG tablet, TAKE ONE TABLET BY MOUTH DAILY, Disp: 90 tablet, Rfl: 3  •  cyclobenzaprine (FLEXERIL) 5 MG tablet, Take 5 mg by mouth 3 (Three) Times a Day As Needed for Muscle Spasms., Disp: , Rfl:   •  docusate sodium (COLACE) 100 MG capsule, Take 1 capsule by mouth 2 (Two) Times a Day As Needed for Constipation.,  Disp: 100 capsule, Rfl: 3  •  escitalopram (LEXAPRO) 20 MG tablet, Take 20 mg by mouth Daily., Disp: , Rfl:   •  ferrous sulfate 325 (65 Fe) MG tablet, TAKE ONE TABLET BY MOUTH DAILY, Disp: 28 tablet, Rfl: 5  •  finasteride (PROSCAR) 5 MG tablet, Take 5 mg by mouth Daily., Disp: , Rfl:   •  fluocinonide (LIDEX) 0.05 % cream, Apply  topically to the appropriate area as directed 2 (Two) Times a Day., Disp: , Rfl:   •  Fluticasone-Umeclidin-Vilant (Trelegy Ellipta) 100-62.5-25 MCG/INH aerosol powder , Inhale 1 puff Daily., Disp: 1 each, Rfl: 6  •  furosemide (LASIX) 20 MG tablet, TAKE ONE TABLET BY MOUTH DAILY, Disp: 30 tablet, Rfl: 1  •  HYDROcodone-acetaminophen (NORCO) 5-325 MG per tablet, TAKE 1 TABLET BY MOUTH EVERY 8 HOURS AS NEEDED FOR SEVERE PAIN, Disp: 45 tablet, Rfl: 0  •  hydrocortisone-eucerin, Apply  topically to the appropriate area as directed., Disp: , Rfl:   •  ipratropium (Atrovent) 0.06 % nasal spray, 2 sprays into the nostril(s) as directed by provider 4 (Four) Times a Day., Disp: 15 mL, Rfl: 1  •  ipratropium-albuterol (DUO-NEB) 0.5-2.5 mg/3 ml nebulizer, Take 3 mL by nebulization Every 4 (Four) Hours As Needed for Wheezing., Disp: 360 mL, Rfl: 6  •  lidocaine (LIDODERM) 5 %, PLACE ONE PATCH ON SKIN AS DIRECTED,REMOVE & DISCARD PATCH WITHIN 12 HOURS, Disp: 30 patch, Rfl: 3  •  magnesium hydroxide (MILK OF MAGNESIA) 400 MG/5ML suspension, Take 15 mL by mouth Daily As Needed for Constipation., Disp: 354 mL, Rfl: 0  •  melatonin 5 MG tablet tablet, @@ TAKE 1 TABLET BY MOUTH EVERY EVENING, Disp: 90 tablet, Rfl: 3  •  methylPREDNISolone (MEDROL) 4 MG dose pack, Take as directed on package instructions., Disp: 21 tablet, Rfl: 0  •  mirtazapine (REMERON) 15 MG tablet, TAKE 1 TABLET BY MOUTH DAILY AT BEDTIME, Disp: 90 tablet, Rfl: 3  •  O2 (OXYGEN), Inhale 4 L/min 1 (One) Time. Walking on 4L and sitting 3L, Disp: , Rfl:   •  oxybutynin (DITROPAN) 5 MG tablet, OAKE 1 TABLET BY MOUTH DAILY AT BEDTIME, Disp:  90 tablet, Rfl: 3  •  pantoprazole (PROTONIX) 40 MG EC tablet, TAKE ONE TABLET BY MOUTH DAILY, Disp: 90 tablet, Rfl: 3  •  polyethylene glycol (MIRALAX) 17 g packet, Take 17 g by mouth Daily., Disp: 30 each, Rfl: 11  •  potassium chloride 10 MEQ CR tablet, Take 10 mEq by mouth Daily., Disp: , Rfl:   •  promethazine (PHENERGAN) 25 MG tablet, TAKE 1 TABLET BY MOUTH EVERY 6 HOURS AS NEEDED FOR NAUSEA AND VOMITING, Disp: 15 tablet, Rfl: 0  •  traZODone (DESYREL) 50 MG tablet, TAKE 1 TABLET BY MOUTH EVERY EVENING, Disp: 90 tablet, Rfl: 3  •  ALPRAZolam (Xanax) 0.25 MG tablet, Take 1 tablet by mouth 2 (Two) Times a Day As Needed for Anxiety., Disp: 30 tablet, Rfl: 1  •  predniSONE (DELTASONE) 10 MG tablet, 6 tab daily for 4 days 5 tab daily for 4 days 4 tab daily for 4 days 3 tab daily for 4 days 2 tab daily cont, Disp: 100 tablet, Rfl: 0    The following portions of the patient's history were reviewed and updated as appropriate: allergies, current medications, past family history, past medical history, past social history, past surgical history and problem list.    Review of Systems   Constitutional: Negative.    Respiratory: Positive for cough and shortness of breath. Negative for wheezing.    Cardiovascular: Negative.    Gastrointestinal: Negative.    Musculoskeletal: Negative.    Skin: Negative.    Neurological: Negative.    Psychiatric/Behavioral: Negative.        Objective   Physical Exam  Neck:      Musculoskeletal: Neck supple.   Cardiovascular:      Rate and Rhythm: Normal rate and regular rhythm.      Heart sounds: Normal heart sounds.   Pulmonary:      Effort: Pulmonary effort is normal.      Breath sounds: Normal breath sounds.   Abdominal:      General: Bowel sounds are normal.   Skin:     General: Skin is warm.   Neurological:      Mental Status: He is alert and oriented to person, place, and time.         All tests have been reviewed.    Assessment/Plan   Diagnoses and all orders for this visit:    Frequent  urination urine analysis unremarkable continue to watch for now  -     POCT urinalysis dipstick, automated  -     Urine Culture - Urine, Urine, Clean Catch  -     Urinalysis With Microscopic - Urine, Clean Catch    COPD with exacerbation (CMS/HCC) trial of steroid taper continue nebulizer.  Pending to see pulmonologist encourage patient to avoid exertion.  -     predniSONE (DELTASONE) 10 MG tablet; 6 tab daily for 4 days  5 tab daily for 4 days  4 tab daily for 4 days  3 tab daily for 4 days  2 tab daily cont    AQUINO (dyspnea on exertion)  -     Adult Transthoracic Echo Complete W/ Cont if Necessary Per Protocol  -     proBNP    Anxiety short of breath is making patient panic we will initiate Xanax as needed  -     ALPRAZolam (Xanax) 0.25 MG tablet; Take 1 tablet by mouth 2 (Two) Times a Day As Needed for Anxiety.    Chronic kidney disease, unspecified CKD stage  -     Renal Function Panel      K+ high and Hb low to blood tests       1 mo

## 2020-12-30 NOTE — TELEPHONE ENCOUNTER
Called pharmacy, asked them to disregard script, Dr. Rojas has prescribed Keflex instead for patient.

## 2020-12-30 NOTE — TELEPHONE ENCOUNTER
Caller: [facility], [place] - [address] - [phone] [facility] [phone] FX    Relationship: Pharmacy    Best call back number: [phone]    What medications are you currently taking:   Current Outpatient Medications on File Prior to Visit   Medication Sig Dispense Refill   • acetaminophen (TYLENOL) 500 MG tablet Take 1 tablet by mouth Every 6 (Six) Hours As Needed for Mild Pain . 45 tablet 1   • alfuzosin (UROXATRAL) 10 MG 24 hr tablet @@ TAKE ONE TABLET BY MOUTH DAILY 90 tablet 3   • ALPRAZolam (Xanax) 0.25 MG tablet Take 1 tablet by mouth 2 (Two) Times a Day As Needed for Anxiety. 30 tablet 1   • amLODIPine (NORVASC) 5 MG tablet TAKE ONE TABLET BY MOUTH DAILY 90 tablet 3   • Aspirin Low Dose 81 MG chewable tablet @@ TAKE ONE TABLET BY MOUTH DAILY 90 tablet 3   • atorvastatin (LIPITOR) 10 MG tablet Take 1 tablet by mouth Every Evening. 90 tablet 3   • benzonatate (TESSALON) 200 MG capsule TAKE ONE CAPSULE BY MOUTH THREE TIMES A DAY AS NEEDED FOR COUGH 45 capsule 0   • carboxymethylcellulose (REFRESH PLUS) 0.5 % solution 1 drop 3 (Three) Times a Day As Needed for Dry Eyes.     • clopidogrel (PLAVIX) 75 MG tablet TAKE ONE TABLET BY MOUTH DAILY 90 tablet 3   • cyclobenzaprine (FLEXERIL) 5 MG tablet Take 5 mg by mouth 3 (Three) Times a Day As Needed for Muscle Spasms.     • docusate sodium (COLACE) 100 MG capsule Take 1 capsule by mouth 2 (Two) Times a Day As Needed for Constipation. 100 capsule 3   • escitalopram (LEXAPRO) 20 MG tablet Take 20 mg by mouth Daily.     • ferrous sulfate 325 (65 Fe) MG tablet TAKE ONE TABLET BY MOUTH DAILY 28 tablet 5   • finasteride (PROSCAR) 5 MG tablet Take 5 mg by mouth Daily.     • fluocinonide (LIDEX) 0.05 % cream Apply  topically to the appropriate area as directed 2 (Two) Times a Day.     • Fluticasone-Umeclidin-Vilant (Trelegy Ellipta) 100-62.5-25 MCG/INH aerosol powder  Inhale 1 puff Daily. 1 each 6   • furosemide (LASIX) 20 MG tablet TAKE ONE TABLET  BY MOUTH DAILY 30 tablet 1   • HYDROcodone-acetaminophen (NORCO) 5-325 MG per tablet TAKE 1 TABLET BY MOUTH EVERY 8 HOURS AS NEEDED FOR SEVERE PAIN 45 tablet 0   • hydrocortisone-eucerin Apply  topically to the appropriate area as directed.     • ipratropium (Atrovent) 0.06 % nasal spray 2 sprays into the nostril(s) as directed by provider 4 (Four) Times a Day. 15 mL 1   • ipratropium-albuterol (DUO-NEB) 0.5-2.5 mg/3 ml nebulizer Take 3 mL by nebulization Every 4 (Four) Hours As Needed for Wheezing. 360 mL 6   • lidocaine (LIDODERM) 5 % PLACE ONE PATCH ON SKIN AS DIRECTED,REMOVE & DISCARD PATCH WITHIN 12 HOURS 30 patch 3   • magnesium hydroxide (MILK OF MAGNESIA) 400 MG/5ML suspension Take 15 mL by mouth Daily As Needed for Constipation. 354 mL 0   • melatonin 5 MG tablet tablet @@ TAKE 1 TABLET BY MOUTH EVERY EVENING 90 tablet 3   • methylPREDNISolone (MEDROL) 4 MG dose pack Take as directed on package instructions. 21 tablet 0   • mirtazapine (REMERON) 15 MG tablet TAKE 1 TABLET BY MOUTH DAILY AT BEDTIME 90 tablet 3   • O2 (OXYGEN) Inhale 4 L/min 1 (One) Time. Walking on 4L and sitting 3L     • oxybutynin (DITROPAN) 5 MG tablet OAKE 1 TABLET BY MOUTH DAILY AT BEDTIME 90 tablet 3   • pantoprazole (PROTONIX) 40 MG EC tablet TAKE ONE TABLET BY MOUTH DAILY 90 tablet 3   • polyethylene glycol (MIRALAX) 17 g packet Take 17 g by mouth Daily. 30 each 11   • potassium chloride 10 MEQ CR tablet Take 10 mEq by mouth Daily.     • predniSONE (DELTASONE) 10 MG tablet 6 tab daily for 4 days  5 tab daily for 4 days  4 tab daily for 4 days  3 tab daily for 4 days  2 tab daily cont 100 tablet 0   • promethazine (PHENERGAN) 25 MG tablet TAKE 1 TABLET BY MOUTH EVERY 6 HOURS AS NEEDED FOR NAUSEA AND VOMITING 15 tablet 0   • sulfamethoxazole-trimethoprim (Bactrim) 400-80 MG tablet Take 1 tablet by mouth 2 (Two) Times a Day. 14 tablet 0   • traZODone (DESYREL) 50 MG tablet TAKE 1 TABLET BY MOUTH EVERY EVENING 90 tablet 3     No current  facility-administered medications on file prior to visit.         Which medication are you concerned about: sulfamethoxazole-trimethoprim (Bactrim) 400-80 MG tablet    Who prescribed you this medication: PCP    What are your concerns: PHARMACY SAYS THAT THEY HAVE THE PATIENT LISTED AS BEING ALLERGIC TO THIS MEDICATION

## 2021-01-01 ENCOUNTER — TELEPHONE (OUTPATIENT)
Dept: INTERNAL MEDICINE | Facility: CLINIC | Age: 71
End: 2021-01-01

## 2021-01-01 ENCOUNTER — TRANSCRIBE ORDERS (OUTPATIENT)
Dept: LAB | Facility: HOSPITAL | Age: 71
End: 2021-01-01

## 2021-01-01 ENCOUNTER — APPOINTMENT (OUTPATIENT)
Dept: CT IMAGING | Facility: HOSPITAL | Age: 71
End: 2021-01-01

## 2021-01-01 ENCOUNTER — READMISSION MANAGEMENT (OUTPATIENT)
Dept: CALL CENTER | Facility: HOSPITAL | Age: 71
End: 2021-01-01

## 2021-01-01 ENCOUNTER — TELEMEDICINE (OUTPATIENT)
Dept: INTERNAL MEDICINE | Facility: CLINIC | Age: 71
End: 2021-01-01

## 2021-01-01 ENCOUNTER — OFFICE VISIT (OUTPATIENT)
Dept: PULMONOLOGY | Facility: CLINIC | Age: 71
End: 2021-01-01

## 2021-01-01 ENCOUNTER — CONSULT (OUTPATIENT)
Dept: ONCOLOGY | Facility: CLINIC | Age: 71
End: 2021-01-01

## 2021-01-01 ENCOUNTER — TELEPHONE (OUTPATIENT)
Dept: PULMONOLOGY | Facility: CLINIC | Age: 71
End: 2021-01-01

## 2021-01-01 ENCOUNTER — OFFICE VISIT (OUTPATIENT)
Dept: INTERNAL MEDICINE | Facility: CLINIC | Age: 71
End: 2021-01-01

## 2021-01-01 ENCOUNTER — HOSPITAL ENCOUNTER (OUTPATIENT)
Facility: HOSPITAL | Age: 71
Setting detail: HOSPITAL OUTPATIENT SURGERY
Discharge: HOME | End: 2021-05-20
Attending: SURGERY | Admitting: SURGERY

## 2021-01-01 ENCOUNTER — APPOINTMENT (OUTPATIENT)
Dept: PREADMISSION TESTING | Facility: HOSPITAL | Age: 71
End: 2021-01-01

## 2021-01-01 ENCOUNTER — APPOINTMENT (OUTPATIENT)
Dept: NUCLEAR MEDICINE | Facility: HOSPITAL | Age: 71
End: 2021-01-01

## 2021-01-01 ENCOUNTER — APPOINTMENT (OUTPATIENT)
Dept: GENERAL RADIOLOGY | Facility: HOSPITAL | Age: 71
End: 2021-01-01

## 2021-01-01 ENCOUNTER — PRIOR AUTHORIZATION (OUTPATIENT)
Dept: INTERNAL MEDICINE | Facility: CLINIC | Age: 71
End: 2021-01-01

## 2021-01-01 ENCOUNTER — HOSPITAL ENCOUNTER (OUTPATIENT)
Facility: HOSPITAL | Age: 71
Setting detail: HOSPITAL OUTPATIENT SURGERY
Discharge: HOME | End: 2021-07-06
Attending: SURGERY | Admitting: SURGERY

## 2021-01-01 ENCOUNTER — EPISODE CHANGES (OUTPATIENT)
Dept: CASE MANAGEMENT | Facility: OTHER | Age: 71
End: 2021-01-01

## 2021-01-01 ENCOUNTER — OFFICE VISIT (OUTPATIENT)
Dept: CARDIOLOGY | Facility: CLINIC | Age: 71
End: 2021-01-01

## 2021-01-01 ENCOUNTER — HOSPITAL ENCOUNTER (INPATIENT)
Facility: HOSPITAL | Age: 71
LOS: 11 days | Discharge: HOSPICE | End: 2021-08-08
Attending: EMERGENCY MEDICINE | Admitting: INTERNAL MEDICINE

## 2021-01-01 ENCOUNTER — APPOINTMENT (OUTPATIENT)
Dept: BONE DENSITY | Facility: HOSPITAL | Age: 71
End: 2021-01-01

## 2021-01-01 ENCOUNTER — LAB (OUTPATIENT)
Dept: LAB | Facility: HOSPITAL | Age: 71
End: 2021-01-01

## 2021-01-01 ENCOUNTER — APPOINTMENT (OUTPATIENT)
Dept: CARDIOLOGY | Facility: HOSPITAL | Age: 71
End: 2021-01-01

## 2021-01-01 ENCOUNTER — HOSPITAL ENCOUNTER (EMERGENCY)
Facility: HOSPITAL | Age: 71
Discharge: HOME | End: 2021-01-20
Attending: EMERGENCY MEDICINE | Admitting: EMERGENCY MEDICINE

## 2021-01-01 ENCOUNTER — HOSPITAL ENCOUNTER (INPATIENT)
Facility: HOSPITAL | Age: 71
LOS: 6 days | Discharge: NURSING, REHAB OR LONG-TERM CARE | End: 2021-07-26
Attending: EMERGENCY MEDICINE | Admitting: FAMILY MEDICINE

## 2021-01-01 ENCOUNTER — APPOINTMENT (OUTPATIENT)
Dept: ULTRASOUND IMAGING | Facility: HOSPITAL | Age: 71
End: 2021-01-01

## 2021-01-01 ENCOUNTER — TRANSITIONAL CARE MANAGEMENT TELEPHONE ENCOUNTER (OUTPATIENT)
Dept: CALL CENTER | Facility: HOSPITAL | Age: 71
End: 2021-01-01

## 2021-01-01 ENCOUNTER — TELEPHONE (OUTPATIENT)
Dept: GASTROENTEROLOGY | Facility: CLINIC | Age: 71
End: 2021-01-01

## 2021-01-01 ENCOUNTER — HOSPITAL ENCOUNTER (INPATIENT)
Facility: HOSPITAL | Age: 71
LOS: 3 days | Discharge: HOME | End: 2021-03-20
Attending: EMERGENCY MEDICINE | Admitting: INTERNAL MEDICINE

## 2021-01-01 ENCOUNTER — OFFICE VISIT (OUTPATIENT)
Dept: GASTROENTEROLOGY | Facility: CLINIC | Age: 71
End: 2021-01-01

## 2021-01-01 ENCOUNTER — HOSPITAL ENCOUNTER (OUTPATIENT)
Dept: CARDIOLOGY | Facility: HOSPITAL | Age: 71
Discharge: HOME | End: 2021-01-13
Admitting: INTERNAL MEDICINE

## 2021-01-01 ENCOUNTER — HOSPITAL ENCOUNTER (EMERGENCY)
Facility: HOSPITAL | Age: 71
Discharge: NURSING, REHAB OR LONG-TERM CARE | End: 2021-03-30
Attending: EMERGENCY MEDICINE | Admitting: EMERGENCY MEDICINE

## 2021-01-01 VITALS
BODY MASS INDEX: 24.59 KG/M2 | OXYGEN SATURATION: 93 % | DIASTOLIC BLOOD PRESSURE: 82 MMHG | SYSTOLIC BLOOD PRESSURE: 114 MMHG | HEART RATE: 69 BPM | HEIGHT: 68 IN | TEMPERATURE: 98.2 F | RESPIRATION RATE: 17 BRPM | WEIGHT: 162.26 LBS

## 2021-01-01 VITALS
WEIGHT: 174.2 LBS | HEART RATE: 91 BPM | DIASTOLIC BLOOD PRESSURE: 64 MMHG | SYSTOLIC BLOOD PRESSURE: 122 MMHG | HEIGHT: 68 IN | BODY MASS INDEX: 26.4 KG/M2 | OXYGEN SATURATION: 90 %

## 2021-01-01 VITALS
RESPIRATION RATE: 26 BRPM | DIASTOLIC BLOOD PRESSURE: 80 MMHG | TEMPERATURE: 98.8 F | BODY MASS INDEX: 25.76 KG/M2 | OXYGEN SATURATION: 96 % | SYSTOLIC BLOOD PRESSURE: 155 MMHG | HEART RATE: 81 BPM | WEIGHT: 170 LBS | HEIGHT: 68 IN

## 2021-01-01 VITALS
WEIGHT: 170 LBS | HEART RATE: 92 BPM | HEIGHT: 68 IN | OXYGEN SATURATION: 90 % | SYSTOLIC BLOOD PRESSURE: 128 MMHG | BODY MASS INDEX: 25.76 KG/M2 | DIASTOLIC BLOOD PRESSURE: 66 MMHG

## 2021-01-01 VITALS
SYSTOLIC BLOOD PRESSURE: 119 MMHG | WEIGHT: 170 LBS | BODY MASS INDEX: 25.76 KG/M2 | HEART RATE: 95 BPM | HEIGHT: 68 IN | DIASTOLIC BLOOD PRESSURE: 76 MMHG | TEMPERATURE: 98.6 F | OXYGEN SATURATION: 97 % | RESPIRATION RATE: 22 BRPM

## 2021-01-01 VITALS
DIASTOLIC BLOOD PRESSURE: 84 MMHG | HEIGHT: 68 IN | TEMPERATURE: 97.1 F | SYSTOLIC BLOOD PRESSURE: 132 MMHG | BODY MASS INDEX: 26.07 KG/M2 | WEIGHT: 172 LBS | HEART RATE: 78 BPM | OXYGEN SATURATION: 91 %

## 2021-01-01 VITALS
OXYGEN SATURATION: 94 % | SYSTOLIC BLOOD PRESSURE: 131 MMHG | DIASTOLIC BLOOD PRESSURE: 69 MMHG | TEMPERATURE: 97.3 F | HEIGHT: 68 IN | HEART RATE: 92 BPM | WEIGHT: 172 LBS | RESPIRATION RATE: 16 BRPM | BODY MASS INDEX: 26.07 KG/M2

## 2021-01-01 VITALS
RESPIRATION RATE: 18 BRPM | DIASTOLIC BLOOD PRESSURE: 55 MMHG | WEIGHT: 175.71 LBS | HEIGHT: 68 IN | SYSTOLIC BLOOD PRESSURE: 102 MMHG | BODY MASS INDEX: 26.63 KG/M2 | HEART RATE: 45 BPM | TEMPERATURE: 97.9 F | OXYGEN SATURATION: 96 %

## 2021-01-01 VITALS
SYSTOLIC BLOOD PRESSURE: 130 MMHG | TEMPERATURE: 97.5 F | DIASTOLIC BLOOD PRESSURE: 84 MMHG | WEIGHT: 171 LBS | OXYGEN SATURATION: 91 % | BODY MASS INDEX: 25.91 KG/M2 | HEART RATE: 81 BPM | HEIGHT: 68 IN

## 2021-01-01 VITALS
SYSTOLIC BLOOD PRESSURE: 144 MMHG | BODY MASS INDEX: 26.5 KG/M2 | HEART RATE: 75 BPM | OXYGEN SATURATION: 99 % | RESPIRATION RATE: 16 BRPM | DIASTOLIC BLOOD PRESSURE: 81 MMHG | WEIGHT: 174.82 LBS | HEIGHT: 68 IN | TEMPERATURE: 98 F

## 2021-01-01 VITALS
HEART RATE: 105 BPM | OXYGEN SATURATION: 68 % | BODY MASS INDEX: 25.85 KG/M2 | HEIGHT: 68 IN | WEIGHT: 170.6 LBS | SYSTOLIC BLOOD PRESSURE: 160 MMHG | DIASTOLIC BLOOD PRESSURE: 62 MMHG

## 2021-01-01 VITALS
HEIGHT: 68 IN | WEIGHT: 173 LBS | SYSTOLIC BLOOD PRESSURE: 145 MMHG | BODY MASS INDEX: 26.22 KG/M2 | RESPIRATION RATE: 20 BRPM | DIASTOLIC BLOOD PRESSURE: 72 MMHG | TEMPERATURE: 97.2 F | HEART RATE: 96 BPM

## 2021-01-01 VITALS
HEIGHT: 68 IN | BODY MASS INDEX: 26.07 KG/M2 | SYSTOLIC BLOOD PRESSURE: 120 MMHG | OXYGEN SATURATION: 93 % | TEMPERATURE: 97.5 F | DIASTOLIC BLOOD PRESSURE: 62 MMHG | HEART RATE: 84 BPM | WEIGHT: 172 LBS

## 2021-01-01 VITALS
SYSTOLIC BLOOD PRESSURE: 115 MMHG | WEIGHT: 180 LBS | DIASTOLIC BLOOD PRESSURE: 90 MMHG | HEART RATE: 93 BPM | HEIGHT: 68 IN | BODY MASS INDEX: 27.28 KG/M2 | TEMPERATURE: 97.3 F | OXYGEN SATURATION: 94 %

## 2021-01-01 VITALS
WEIGHT: 177 LBS | DIASTOLIC BLOOD PRESSURE: 86 MMHG | SYSTOLIC BLOOD PRESSURE: 140 MMHG | BODY MASS INDEX: 26.83 KG/M2 | OXYGEN SATURATION: 97 % | HEART RATE: 75 BPM | HEIGHT: 68 IN | TEMPERATURE: 96.8 F

## 2021-01-01 DIAGNOSIS — E78.2 MIXED HYPERLIPIDEMIA: ICD-10-CM

## 2021-01-01 DIAGNOSIS — J96.22 ACUTE ON CHRONIC RESPIRATORY FAILURE WITH HYPOXIA AND HYPERCAPNIA (HCC): Primary | ICD-10-CM

## 2021-01-01 DIAGNOSIS — N18.9 CHRONIC KIDNEY DISEASE, UNSPECIFIED CKD STAGE: ICD-10-CM

## 2021-01-01 DIAGNOSIS — H61.22 IMPACTED CERUMEN OF LEFT EAR: ICD-10-CM

## 2021-01-01 DIAGNOSIS — R06.09 DOE (DYSPNEA ON EXERTION): Primary | ICD-10-CM

## 2021-01-01 DIAGNOSIS — R82.71 BACTERIURIA: ICD-10-CM

## 2021-01-01 DIAGNOSIS — Z01.818 PRE-OP EVALUATION: ICD-10-CM

## 2021-01-01 DIAGNOSIS — Z00.00 WELLNESS EXAMINATION: ICD-10-CM

## 2021-01-01 DIAGNOSIS — R39.14 BENIGN PROSTATIC HYPERPLASIA WITH INCOMPLETE BLADDER EMPTYING: ICD-10-CM

## 2021-01-01 DIAGNOSIS — R00.0 SINUS TACHYCARDIA: Primary | ICD-10-CM

## 2021-01-01 DIAGNOSIS — J44.9 STEROID-DEPENDENT COPD (HCC): ICD-10-CM

## 2021-01-01 DIAGNOSIS — J96.00 ACUTE RESPIRATORY FAILURE, UNSPECIFIED WHETHER WITH HYPOXIA OR HYPERCAPNIA (HCC): Primary | ICD-10-CM

## 2021-01-01 DIAGNOSIS — Z95.0 CARDIAC PACEMAKER IN SITU: ICD-10-CM

## 2021-01-01 DIAGNOSIS — K44.9 HIATAL HERNIA: ICD-10-CM

## 2021-01-01 DIAGNOSIS — J96.11 CHRONIC RESPIRATORY FAILURE WITH HYPOXIA AND HYPERCAPNIA (HCC): ICD-10-CM

## 2021-01-01 DIAGNOSIS — J40 BRONCHITIS: Primary | ICD-10-CM

## 2021-01-01 DIAGNOSIS — R06.02 SHORTNESS OF BREATH: Primary | ICD-10-CM

## 2021-01-01 DIAGNOSIS — K22.89 PRESBYESOPHAGUS: ICD-10-CM

## 2021-01-01 DIAGNOSIS — Z01.818 PRE-OP TESTING: ICD-10-CM

## 2021-01-01 DIAGNOSIS — Z87.74 HISTORY OF VENTRICULAR SEPTAL DEFECT: ICD-10-CM

## 2021-01-01 DIAGNOSIS — R13.19 ESOPHAGEAL DYSPHAGIA: Primary | ICD-10-CM

## 2021-01-01 DIAGNOSIS — K76.0 FATTY (CHANGE OF) LIVER, NOT ELSEWHERE CLASSIFIED: ICD-10-CM

## 2021-01-01 DIAGNOSIS — R00.2 PALPITATION: ICD-10-CM

## 2021-01-01 DIAGNOSIS — I10 ESSENTIAL HYPERTENSION: ICD-10-CM

## 2021-01-01 DIAGNOSIS — G47.33 OSA ON CPAP: ICD-10-CM

## 2021-01-01 DIAGNOSIS — R13.19 ESOPHAGEAL DYSPHAGIA: ICD-10-CM

## 2021-01-01 DIAGNOSIS — J96.02 ACUTE RESPIRATORY FAILURE WITH HYPERCAPNIA (HCC): Primary | ICD-10-CM

## 2021-01-01 DIAGNOSIS — N39.0 URINARY TRACT INFECTION WITHOUT HEMATURIA, SITE UNSPECIFIED: ICD-10-CM

## 2021-01-01 DIAGNOSIS — D64.9 ANEMIA, UNSPECIFIED TYPE: ICD-10-CM

## 2021-01-01 DIAGNOSIS — Z01.818 PRE-OP TESTING: Primary | ICD-10-CM

## 2021-01-01 DIAGNOSIS — F41.9 ANXIETY: ICD-10-CM

## 2021-01-01 DIAGNOSIS — Z79.52 CURRENT CHRONIC USE OF SYSTEMIC STEROIDS: ICD-10-CM

## 2021-01-01 DIAGNOSIS — R00.2 PALPITATION: Primary | ICD-10-CM

## 2021-01-01 DIAGNOSIS — Z99.81 OXYGEN DEPENDENT: ICD-10-CM

## 2021-01-01 DIAGNOSIS — J96.12 CHRONIC RESPIRATORY FAILURE WITH HYPOXIA AND HYPERCAPNIA (HCC): ICD-10-CM

## 2021-01-01 DIAGNOSIS — J18.9 HAP (HOSPITAL-ACQUIRED PNEUMONIA): ICD-10-CM

## 2021-01-01 DIAGNOSIS — Z99.89 OSA ON CPAP: ICD-10-CM

## 2021-01-01 DIAGNOSIS — I25.119 CORONARY ARTERY DISEASE INVOLVING NATIVE CORONARY ARTERY OF NATIVE HEART WITH ANGINA PECTORIS (HCC): ICD-10-CM

## 2021-01-01 DIAGNOSIS — R04.0 EPISTAXIS: Primary | ICD-10-CM

## 2021-01-01 DIAGNOSIS — J44.1 COPD EXACERBATION (HCC): ICD-10-CM

## 2021-01-01 DIAGNOSIS — J96.21 ACUTE ON CHRONIC RESPIRATORY FAILURE WITH HYPOXIA AND HYPERCAPNIA (HCC): Primary | ICD-10-CM

## 2021-01-01 DIAGNOSIS — K57.90 DIVERTICULOSIS: ICD-10-CM

## 2021-01-01 DIAGNOSIS — M50.30 DEGENERATION OF CERVICAL INTERVERTEBRAL DISC: ICD-10-CM

## 2021-01-01 DIAGNOSIS — E78.2 MIXED HYPERLIPIDEMIA: Primary | ICD-10-CM

## 2021-01-01 DIAGNOSIS — Z01.818 PRE-OP EVALUATION: Primary | ICD-10-CM

## 2021-01-01 DIAGNOSIS — K59.03 DRUG-INDUCED CONSTIPATION: ICD-10-CM

## 2021-01-01 DIAGNOSIS — S30.1XXA CONTUSION OF ABDOMINAL WALL, INITIAL ENCOUNTER: Primary | ICD-10-CM

## 2021-01-01 DIAGNOSIS — F32.A DEPRESSION, UNSPECIFIED DEPRESSION TYPE: ICD-10-CM

## 2021-01-01 DIAGNOSIS — R82.90 ABNORMAL URINALYSIS: ICD-10-CM

## 2021-01-01 DIAGNOSIS — N18.9 CHRONIC RENAL IMPAIRMENT, UNSPECIFIED CKD STAGE: ICD-10-CM
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
## 2021-01-01 DIAGNOSIS — T14.8XXA HEMATOMA: Primary | ICD-10-CM

## 2021-01-01 DIAGNOSIS — J44.1 COPD WITH EXACERBATION (HCC): ICD-10-CM

## 2021-01-01 DIAGNOSIS — N39.0 URINARY TRACT INFECTION WITHOUT HEMATURIA, SITE UNSPECIFIED: Primary | ICD-10-CM

## 2021-01-01 DIAGNOSIS — I48.0 PAROXYSMAL ATRIAL FIBRILLATION (HCC): ICD-10-CM

## 2021-01-01 DIAGNOSIS — I50.9 ACUTE CONGESTIVE HEART FAILURE, UNSPECIFIED HEART FAILURE TYPE (HCC): ICD-10-CM

## 2021-01-01 DIAGNOSIS — N40.1 BENIGN PROSTATIC HYPERPLASIA WITH INCOMPLETE BLADDER EMPTYING: ICD-10-CM

## 2021-01-01 DIAGNOSIS — E55.9 VITAMIN D DEFICIENCY, UNSPECIFIED: ICD-10-CM

## 2021-01-01 DIAGNOSIS — R60.0 BILATERAL LEG EDEMA: ICD-10-CM

## 2021-01-01 DIAGNOSIS — K21.9 GASTROESOPHAGEAL REFLUX DISEASE WITHOUT ESOPHAGITIS: ICD-10-CM

## 2021-01-01 DIAGNOSIS — I50.9 ACUTE ON CHRONIC CONGESTIVE HEART FAILURE, UNSPECIFIED HEART FAILURE TYPE (HCC): ICD-10-CM

## 2021-01-01 DIAGNOSIS — D64.9 ANEMIA, UNSPECIFIED TYPE: Primary | ICD-10-CM

## 2021-01-01 DIAGNOSIS — I49.5 SICK SINUS SYNDROME (HCC): Primary | ICD-10-CM

## 2021-01-01 DIAGNOSIS — Z87.891 PERSONAL HISTORY OF TOBACCO USE, PRESENTING HAZARDS TO HEALTH: ICD-10-CM

## 2021-01-01 DIAGNOSIS — R09.02 HYPOXIA: ICD-10-CM

## 2021-01-01 DIAGNOSIS — R13.19 OTHER DYSPHAGIA: ICD-10-CM

## 2021-01-01 DIAGNOSIS — I25.10 CORONARY ARTERY DISEASE INVOLVING NATIVE CORONARY ARTERY OF NATIVE HEART WITHOUT ANGINA PECTORIS: Primary | ICD-10-CM

## 2021-01-01 DIAGNOSIS — I50.32 CHRONIC DIASTOLIC CONGESTIVE HEART FAILURE (HCC): ICD-10-CM

## 2021-01-01 DIAGNOSIS — M40.209 KYPHOSIS, ACQUIRED: ICD-10-CM

## 2021-01-01 DIAGNOSIS — Z01.818 PREOP TESTING: Primary | ICD-10-CM

## 2021-01-01 DIAGNOSIS — I44.1 AV BLOCK, MOBITZ II: ICD-10-CM

## 2021-01-01 DIAGNOSIS — M54.16 LUMBAR RADICULOPATHY: ICD-10-CM

## 2021-01-01 DIAGNOSIS — Z01.812 PRE-PROCEDURAL LABORATORY EXAMINATION: ICD-10-CM

## 2021-01-01 DIAGNOSIS — J43.1 PANLOBULAR EMPHYSEMA (HCC): ICD-10-CM

## 2021-01-01 DIAGNOSIS — K64.9 HEMORRHOIDS, UNSPECIFIED HEMORRHOID TYPE: ICD-10-CM

## 2021-01-01 DIAGNOSIS — S30.1XXA ABDOMINAL WALL HEMATOMA, INITIAL ENCOUNTER: ICD-10-CM

## 2021-01-01 DIAGNOSIS — I49.5 SICK SINUS SYNDROME (HCC): ICD-10-CM

## 2021-01-01 DIAGNOSIS — Z87.09 HISTORY OF COPD: ICD-10-CM

## 2021-01-01 DIAGNOSIS — S30.0XXA TRAUMATIC HEMATOMA OF LOWER BACK, INITIAL ENCOUNTER: ICD-10-CM

## 2021-01-01 DIAGNOSIS — N17.9 AKI (ACUTE KIDNEY INJURY) (HCC): ICD-10-CM

## 2021-01-01 DIAGNOSIS — T14.8XXA HEMATOMA: ICD-10-CM

## 2021-01-01 DIAGNOSIS — J44.9 CHRONIC OBSTRUCTIVE PULMONARY DISEASE, UNSPECIFIED COPD TYPE (HCC): ICD-10-CM

## 2021-01-01 DIAGNOSIS — J96.22 ACUTE ON CHRONIC RESPIRATORY FAILURE WITH HYPERCAPNIA (HCC): ICD-10-CM

## 2021-01-01 DIAGNOSIS — L85.3 XEROSIS OF SKIN: ICD-10-CM

## 2021-01-01 LAB
25(OH)D3+25(OH)D2 SERPL-MCNC: 24.2 NG/ML (ref 30–100)
A-A DO2: 102.9 MMHG
A-A DO2: 104.6 MMHG
A-A DO2: 119.6 MMHG
A-A DO2: 130.4 MMHG
A-A DO2: 180.6 MMHG
A-A DO2: 408.5 MMHG
ABO GROUP BLD: NORMAL
ALBUMIN SERPL-MCNC: 2.7 G/DL (ref 3.5–5.2)
ALBUMIN SERPL-MCNC: 2.7 G/DL (ref 3.5–5.2)
ALBUMIN SERPL-MCNC: 2.8 G/DL (ref 3.5–5.2)
ALBUMIN SERPL-MCNC: 2.9 G/DL (ref 3.5–5.2)
ALBUMIN SERPL-MCNC: 3 G/DL (ref 3.5–5.2)
ALBUMIN SERPL-MCNC: 3.1 G/DL (ref 3.5–5.2)
ALBUMIN SERPL-MCNC: 3.2 G/DL (ref 3.5–5.2)
ALBUMIN SERPL-MCNC: 3.6 G/DL (ref 3.5–5.2)
ALBUMIN SERPL-MCNC: 3.6 G/DL (ref 3.5–5.2)
ALBUMIN SERPL-MCNC: 3.8 G/DL (ref 3.5–5.2)
ALBUMIN SERPL-MCNC: 3.8 G/DL (ref 3.5–5.2)
ALBUMIN SERPL-MCNC: 4 G/DL (ref 3.5–5.2)
ALBUMIN SERPL-MCNC: 4.1 G/DL (ref 3.5–5.2)
ALBUMIN SERPL-MCNC: 4.2 G/DL (ref 3.5–5.2)
ALBUMIN SERPL-MCNC: 4.4 G/DL (ref 3.5–5.2)
ALBUMIN SERPL-MCNC: 4.6 G/DL (ref 3.5–5.2)
ALBUMIN SERPL-MCNC: 4.6 G/DL (ref 3.5–5.2)
ALBUMIN SERPL-MCNC: 5 G/DL (ref 3.5–5.2)
ALBUMIN/GLOB SERPL: 0.9 G/DL
ALBUMIN/GLOB SERPL: 1 G/DL
ALBUMIN/GLOB SERPL: 1.1 G/DL
ALBUMIN/GLOB SERPL: 1.2 G/DL
ALBUMIN/GLOB SERPL: 1.4 G/DL
ALBUMIN/GLOB SERPL: 1.5 G/DL
ALBUMIN/GLOB SERPL: 1.6 G/DL
ALBUMIN/GLOB SERPL: 2.1 G/DL
ALBUMIN/GLOB SERPL: 2.4 G/DL
ALBUMIN/GLOB SERPL: 2.5 G/DL
ALP SERPL-CCNC: 105 U/L (ref 39–117)
ALP SERPL-CCNC: 106 U/L (ref 39–117)
ALP SERPL-CCNC: 62 U/L (ref 39–117)
ALP SERPL-CCNC: 64 U/L (ref 39–117)
ALP SERPL-CCNC: 66 U/L (ref 39–117)
ALP SERPL-CCNC: 76 U/L (ref 39–117)
ALP SERPL-CCNC: 78 U/L (ref 39–117)
ALP SERPL-CCNC: 78 U/L (ref 39–117)
ALP SERPL-CCNC: 82 U/L (ref 39–117)
ALP SERPL-CCNC: 83 U/L (ref 39–117)
ALP SERPL-CCNC: 83 U/L (ref 39–117)
ALP SERPL-CCNC: 85 U/L (ref 39–117)
ALP SERPL-CCNC: 86 U/L (ref 39–117)
ALP SERPL-CCNC: 87 U/L (ref 39–117)
ALP SERPL-CCNC: 88 U/L (ref 39–117)
ALP SERPL-CCNC: 96 U/L (ref 39–117)
ALT SERPL W P-5'-P-CCNC: 10 U/L (ref 1–41)
ALT SERPL W P-5'-P-CCNC: 11 U/L (ref 1–41)
ALT SERPL W P-5'-P-CCNC: 12 U/L (ref 1–41)
ALT SERPL W P-5'-P-CCNC: 13 U/L (ref 1–41)
ALT SERPL W P-5'-P-CCNC: 14 U/L (ref 1–41)
ALT SERPL W P-5'-P-CCNC: 15 U/L (ref 1–41)
ALT SERPL W P-5'-P-CCNC: 17 U/L (ref 1–41)
ALT SERPL W P-5'-P-CCNC: 19 U/L (ref 1–41)
ALT SERPL W P-5'-P-CCNC: 9 U/L (ref 1–41)
ALT SERPL W P-5'-P-CCNC: 9 U/L (ref 1–41)
ALT SERPL-CCNC: 17 U/L (ref 1–41)
ALT SERPL-CCNC: 9 U/L (ref 1–41)
AMORPH URATE CRY URNS QL MICRO: ABNORMAL /HPF
ANION GAP SERPL CALCULATED.3IONS-SCNC: 10.7 MMOL/L (ref 5–15)
ANION GAP SERPL CALCULATED.3IONS-SCNC: 10.7 MMOL/L (ref 5–15)
ANION GAP SERPL CALCULATED.3IONS-SCNC: 11.8 MMOL/L (ref 5–15)
ANION GAP SERPL CALCULATED.3IONS-SCNC: 11.9 MMOL/L (ref 5–15)
ANION GAP SERPL CALCULATED.3IONS-SCNC: 12.8 MMOL/L (ref 5–15)
ANION GAP SERPL CALCULATED.3IONS-SCNC: 12.8 MMOL/L (ref 5–15)
ANION GAP SERPL CALCULATED.3IONS-SCNC: 13.1 MMOL/L (ref 5–15)
ANION GAP SERPL CALCULATED.3IONS-SCNC: 13.3 MMOL/L (ref 5–15)
ANION GAP SERPL CALCULATED.3IONS-SCNC: 13.8 MMOL/L (ref 5–15)
ANION GAP SERPL CALCULATED.3IONS-SCNC: 14.3 MMOL/L (ref 5–15)
ANION GAP SERPL CALCULATED.3IONS-SCNC: 17.7 MMOL/L (ref 5–15)
ANION GAP SERPL CALCULATED.3IONS-SCNC: 19.1 MMOL/L (ref 5–15)
ANION GAP SERPL CALCULATED.3IONS-SCNC: 3.8 MMOL/L (ref 5–15)
ANION GAP SERPL CALCULATED.3IONS-SCNC: 5.3 MMOL/L (ref 5–15)
ANION GAP SERPL CALCULATED.3IONS-SCNC: 5.9 MMOL/L (ref 5–15)
ANION GAP SERPL CALCULATED.3IONS-SCNC: 6.2 MMOL/L (ref 5–15)
ANION GAP SERPL CALCULATED.3IONS-SCNC: 6.3 MMOL/L (ref 5–15)
ANION GAP SERPL CALCULATED.3IONS-SCNC: 7.5 MMOL/L (ref 5–15)
ANION GAP SERPL CALCULATED.3IONS-SCNC: 8 MMOL/L (ref 5–15)
ANION GAP SERPL CALCULATED.3IONS-SCNC: 9 MMOL/L (ref 5–15)
ANION GAP SERPL CALCULATED.3IONS-SCNC: 9.1 MMOL/L (ref 5–15)
ANION GAP SERPL CALCULATED.3IONS-SCNC: 9.1 MMOL/L (ref 5–15)
ANION GAP SERPL CALCULATED.3IONS-SCNC: 9.7 MMOL/L (ref 5–15)
ANION GAP SERPL CALCULATED.3IONS-SCNC: 9.8 MMOL/L (ref 5–15)
ANION GAP SERPL CALCULATED.3IONS-SCNC: 9.8 MMOL/L (ref 5–15)
ANISOCYTOSIS BLD QL: NORMAL
APPEARANCE UR: CLEAR
APTT PPP: 115 SECONDS (ref 70–100)
APTT PPP: 119 SECONDS (ref 70–100)
APTT PPP: 40.1 SECONDS (ref 70–100)
APTT PPP: 61.9 SECONDS (ref 70–100)
APTT PPP: 62.9 SECONDS (ref 70–100)
APTT PPP: 76.1 SECONDS (ref 70–100)
APTT PPP: 80.8 SECONDS (ref 70–100)
ARTERIAL PATENCY WRIST A: ABNORMAL
ARTERIAL PATENCY WRIST A: POSITIVE
AST SERPL-CCNC: 17 U/L (ref 1–40)
AST SERPL-CCNC: 18 U/L (ref 1–40)
AST SERPL-CCNC: 19 U/L (ref 1–40)
AST SERPL-CCNC: 19 U/L (ref 1–40)
AST SERPL-CCNC: 20 U/L (ref 1–40)
AST SERPL-CCNC: 21 U/L (ref 1–40)
AST SERPL-CCNC: 22 U/L (ref 1–40)
AST SERPL-CCNC: 23 U/L (ref 1–40)
AST SERPL-CCNC: 24 U/L (ref 1–40)
AST SERPL-CCNC: 25 U/L (ref 1–40)
AST SERPL-CCNC: 25 U/L (ref 1–40)
AST SERPL-CCNC: 27 U/L (ref 1–40)
AST SERPL-CCNC: 37 U/L (ref 1–40)
AST SERPL-CCNC: 44 U/L (ref 1–40)
ATMOSPHERIC PRESS: 730 MMHG
ATMOSPHERIC PRESS: 733 MMHG
ATMOSPHERIC PRESS: 733 MMHG
ATMOSPHERIC PRESS: 736 MMHG
ATMOSPHERIC PRESS: 736 MMHG
ATMOSPHERIC PRESS: 738 MMHG
B PARAPERT DNA SPEC QL NAA+PROBE: NOT DETECTED
B PERT DNA SPEC QL NAA+PROBE: NOT DETECTED
BACTERIA SPEC AEROBE CULT: ABNORMAL
BACTERIA SPEC AEROBE CULT: NO GROWTH
BACTERIA SPEC AEROBE CULT: NORMAL
BACTERIA SPEC RESP CULT: NORMAL
BACTERIA UR QL AUTO: ABNORMAL /HPF
BASE EXCESS BLDA CALC-SCNC: 10.9 MMOL/L (ref 0–2)
BASE EXCESS BLDA CALC-SCNC: 14.7 MMOL/L (ref 0–2)
BASE EXCESS BLDA CALC-SCNC: 7 MMOL/L (ref 0–2)
BASE EXCESS BLDA CALC-SCNC: 8.1 MMOL/L (ref 0–2)
BASE EXCESS BLDA CALC-SCNC: 9.4 MMOL/L (ref 0–2)
BASE EXCESS BLDA CALC-SCNC: 9.6 MMOL/L (ref 0–2)
BASOPHILS # BLD AUTO: 0 10*3/MM3 (ref 0–0.2)
BASOPHILS # BLD AUTO: 0 10*3/MM3 (ref 0–0.2)
BASOPHILS # BLD AUTO: 0.01 10*3/MM3 (ref 0–0.2)
BASOPHILS # BLD AUTO: 0.02 10*3/MM3 (ref 0–0.2)
BASOPHILS # BLD AUTO: 0.03 10*3/MM3 (ref 0–0.2)
BASOPHILS # BLD AUTO: 0.04 10*3/MM3 (ref 0–0.2)
BASOPHILS # BLD AUTO: 0.05 10*3/MM3 (ref 0–0.2)
BASOPHILS NFR BLD AUTO: 0 % (ref 0–1.5)
BASOPHILS NFR BLD AUTO: 0 % (ref 0–1.5)
BASOPHILS NFR BLD AUTO: 0.1 % (ref 0–1.5)
BASOPHILS NFR BLD AUTO: 0.2 % (ref 0–1.5)
BASOPHILS NFR BLD AUTO: 0.3 % (ref 0–1.5)
BASOPHILS NFR BLD AUTO: 0.4 % (ref 0–1.5)
BASOPHILS NFR BLD AUTO: 0.4 % (ref 0–1.5)
BASOPHILS NFR BLD AUTO: 0.5 % (ref 0–1.5)
BASOPHILS NFR BLD AUTO: 0.5 % (ref 0–1.5)
BASOPHILS NFR BLD AUTO: 0.6 % (ref 0–1.5)
BDY SITE: ABNORMAL
BH BB BLOOD EXPIRATION DATE: NORMAL
BH BB BLOOD TYPE BARCODE: 5100
BH BB DISPENSE STATUS: NORMAL
BH BB PRODUCT CODE: NORMAL
BH BB UNIT NUMBER: NORMAL
BH CV ECHO MEAS - % IVS THICK: 3.5 %
BH CV ECHO MEAS - % LVPW THICK: 53.7 %
BH CV ECHO MEAS - AI DEC SLOPE: 304 CM/SEC^2
BH CV ECHO MEAS - AI DEC SLOPE: 388 CM/SEC^2
BH CV ECHO MEAS - AI MAX PG: 48.3 MMHG
BH CV ECHO MEAS - AI MAX PG: 54.5 MMHG
BH CV ECHO MEAS - AI MAX VEL: 347 CM/SEC
BH CV ECHO MEAS - AI MAX VEL: 369 CM/SEC
BH CV ECHO MEAS - AI P1/2T: 278.6 MSEC
BH CV ECHO MEAS - AI P1/2T: 334.3 MSEC
BH CV ECHO MEAS - AO MAX PG (FULL): 6.9 MMHG
BH CV ECHO MEAS - AO MAX PG (FULL): 7.6 MMHG
BH CV ECHO MEAS - AO MAX PG: 10 MMHG
BH CV ECHO MEAS - AO MAX PG: 11 MMHG
BH CV ECHO MEAS - AO MEAN PG (FULL): 3 MMHG
BH CV ECHO MEAS - AO MEAN PG (FULL): 4 MMHG
BH CV ECHO MEAS - AO MEAN PG: 4 MMHG
BH CV ECHO MEAS - AO MEAN PG: 5 MMHG
BH CV ECHO MEAS - AO ROOT AREA (BSA CORRECTED): 1.9
BH CV ECHO MEAS - AO ROOT AREA (BSA CORRECTED): 2.1
BH CV ECHO MEAS - AO ROOT AREA: 10.2 CM^2
BH CV ECHO MEAS - AO ROOT AREA: 12.3 CM^2
BH CV ECHO MEAS - AO ROOT DIAM: 3.6 CM
BH CV ECHO MEAS - AO ROOT DIAM: 4 CM
BH CV ECHO MEAS - AO V2 MAX: 162 CM/SEC
BH CV ECHO MEAS - AO V2 MAX: 163 CM/SEC
BH CV ECHO MEAS - AO V2 MEAN: 102 CM/SEC
BH CV ECHO MEAS - AO V2 MEAN: 94.3 CM/SEC
BH CV ECHO MEAS - AO V2 VTI: 28.9 CM
BH CV ECHO MEAS - AO V2 VTI: 32.1 CM
BH CV ECHO MEAS - ASC AORTA: 2.6 CM
BH CV ECHO MEAS - AVA(I,A): 2.9 CM^2
BH CV ECHO MEAS - AVA(I,A): 3.2 CM^2
BH CV ECHO MEAS - AVA(I,D): 2.9 CM^2
BH CV ECHO MEAS - AVA(I,D): 3.2 CM^2
BH CV ECHO MEAS - AVA(V,A): 2.7 CM^2
BH CV ECHO MEAS - AVA(V,A): 3 CM^2
BH CV ECHO MEAS - AVA(V,D): 2.7 CM^2
BH CV ECHO MEAS - AVA(V,D): 3 CM^2
BH CV ECHO MEAS - BSA(HAYCOCK): 1.9 M^2
BH CV ECHO MEAS - BSA(HAYCOCK): 2 M^2
BH CV ECHO MEAS - BSA: 1.9 M^2
BH CV ECHO MEAS - BSA: 1.9 M^2
BH CV ECHO MEAS - BZI_BMI: 25.5 KILOGRAMS/M^2
BH CV ECHO MEAS - BZI_BMI: 26.6 KILOGRAMS/M^2
BH CV ECHO MEAS - BZI_METRIC_HEIGHT: 172.7 CM
BH CV ECHO MEAS - BZI_METRIC_HEIGHT: 172.7 CM
BH CV ECHO MEAS - BZI_METRIC_WEIGHT: 76.2 KG
BH CV ECHO MEAS - BZI_METRIC_WEIGHT: 79.4 KG
BH CV ECHO MEAS - EDV(CUBED): 275.9 ML
BH CV ECHO MEAS - EDV(CUBED): 390.6 ML
BH CV ECHO MEAS - EDV(MOD-SP2): 186 ML
BH CV ECHO MEAS - EDV(MOD-SP2): 303 ML
BH CV ECHO MEAS - EDV(MOD-SP4): 203 ML
BH CV ECHO MEAS - EDV(MOD-SP4): 284 ML
BH CV ECHO MEAS - EDV(TEICH): 216.8 ML
BH CV ECHO MEAS - EDV(TEICH): 281.6 ML
BH CV ECHO MEAS - EF(CUBED): 15.5 %
BH CV ECHO MEAS - EF(CUBED): 16.9 %
BH CV ECHO MEAS - EF(MOD-BP): 24 %
BH CV ECHO MEAS - EF(MOD-BP): 30.3 %
BH CV ECHO MEAS - EF(MOD-SP2): 25.3 %
BH CV ECHO MEAS - EF(MOD-SP2): 32 %
BH CV ECHO MEAS - EF(MOD-SP4): 27.1 %
BH CV ECHO MEAS - EF(MOD-SP4): 31.7 %
BH CV ECHO MEAS - EF(TEICH): 11.9 %
BH CV ECHO MEAS - EF(TEICH): 13.1 %
BH CV ECHO MEAS - ESV(CUBED): 229.2 ML
BH CV ECHO MEAS - ESV(CUBED): 329.9 ML
BH CV ECHO MEAS - ESV(MOD-SP2): 139 ML
BH CV ECHO MEAS - ESV(MOD-SP2): 206 ML
BH CV ECHO MEAS - ESV(MOD-SP4): 148 ML
BH CV ECHO MEAS - ESV(MOD-SP4): 194 ML
BH CV ECHO MEAS - ESV(TEICH): 188.3 ML
BH CV ECHO MEAS - ESV(TEICH): 248.1 ML
BH CV ECHO MEAS - FS: 5.5 %
BH CV ECHO MEAS - FS: 6 %
BH CV ECHO MEAS - IVS/LVPW: 1.1
BH CV ECHO MEAS - IVS/LVPW: 1.3
BH CV ECHO MEAS - IVSD: 0.84 CM
BH CV ECHO MEAS - IVSD: 1.4 CM
BH CV ECHO MEAS - IVSS: 1.5 CM
BH CV ECHO MEAS - LA DIMENSION: 4.8 CM
BH CV ECHO MEAS - LA DIMENSION: 5 CM
BH CV ECHO MEAS - LA/AO: 1.2
BH CV ECHO MEAS - LA/AO: 1.4
BH CV ECHO MEAS - LAD MAJOR: 5.1 CM
BH CV ECHO MEAS - LAD MAJOR: 5.4 CM
BH CV ECHO MEAS - LAT PEAK E' VEL: 12.4 CM/SEC
BH CV ECHO MEAS - LAT PEAK E' VEL: 9.7 CM/SEC
BH CV ECHO MEAS - LATERAL E/E' RATIO: 8.5
BH CV ECHO MEAS - LATERAL E/E' RATIO: 8.7
BH CV ECHO MEAS - LV DIASTOLIC VOL/BSA (35-75): 107 ML/M^2
BH CV ECHO MEAS - LV DIASTOLIC VOL/BSA (35-75): 147.1 ML/M^2
BH CV ECHO MEAS - LV MASS(C)D: 272.5 GRAMS
BH CV ECHO MEAS - LV MASS(C)D: 377.6 GRAMS
BH CV ECHO MEAS - LV MASS(C)DI: 143.6 GRAMS/M^2
BH CV ECHO MEAS - LV MASS(C)DI: 195.5 GRAMS/M^2
BH CV ECHO MEAS - LV MASS(C)S: 466.2 GRAMS
BH CV ECHO MEAS - LV MASS(C)SI: 241.4 GRAMS/M^2
BH CV ECHO MEAS - LV MAX PG: 3.1 MMHG
BH CV ECHO MEAS - LV MAX PG: 3.4 MMHG
BH CV ECHO MEAS - LV MEAN PG: 1 MMHG
BH CV ECHO MEAS - LV MEAN PG: 1 MMHG
BH CV ECHO MEAS - LV SYSTOLIC VOL/BSA (12-30): 100.5 ML/M^2
BH CV ECHO MEAS - LV SYSTOLIC VOL/BSA (12-30): 78 ML/M^2
BH CV ECHO MEAS - LV V1 MAX: 87.6 CM/SEC
BH CV ECHO MEAS - LV V1 MAX: 91.7 CM/SEC
BH CV ECHO MEAS - LV V1 MEAN: 48.2 CM/SEC
BH CV ECHO MEAS - LV V1 MEAN: 52.2 CM/SEC
BH CV ECHO MEAS - LV V1 VTI: 15.7 CM
BH CV ECHO MEAS - LV V1 VTI: 20.5 CM
BH CV ECHO MEAS - LVIDD: 6.5 CM
BH CV ECHO MEAS - LVIDD: 7.3 CM
BH CV ECHO MEAS - LVIDS: 6.1 CM
BH CV ECHO MEAS - LVIDS: 6.9 CM
BH CV ECHO MEAS - LVLD AP2: 8.7 CM
BH CV ECHO MEAS - LVLD AP2: 9.9 CM
BH CV ECHO MEAS - LVLD AP4: 8.9 CM
BH CV ECHO MEAS - LVLD AP4: 9.9 CM
BH CV ECHO MEAS - LVLS AP2: 7.8 CM
BH CV ECHO MEAS - LVLS AP2: 9.5 CM
BH CV ECHO MEAS - LVLS AP4: 8.4 CM
BH CV ECHO MEAS - LVLS AP4: 9 CM
BH CV ECHO MEAS - LVOT AREA (M): 5 CM^2
BH CV ECHO MEAS - LVOT AREA (M): 5.3 CM^2
BH CV ECHO MEAS - LVOT AREA: 5 CM^2
BH CV ECHO MEAS - LVOT AREA: 5.3 CM^2
BH CV ECHO MEAS - LVOT DIAM: 2.5 CM
BH CV ECHO MEAS - LVOT DIAM: 2.6 CM
BH CV ECHO MEAS - LVPWD: 0.8 CM
BH CV ECHO MEAS - LVPWD: 1.1 CM
BH CV ECHO MEAS - LVPWS: 1.7 CM
BH CV ECHO MEAS - MED PEAK E' VEL: 4.2 CM/SEC
BH CV ECHO MEAS - MED PEAK E' VEL: 5 CM/SEC
BH CV ECHO MEAS - MEDIAL E/E' RATIO: 19.8
BH CV ECHO MEAS - MEDIAL E/E' RATIO: 21.2
BH CV ECHO MEAS - MV A MAX VEL: 77 CM/SEC
BH CV ECHO MEAS - MV A MAX VEL: 97 CM/SEC
BH CV ECHO MEAS - MV DEC TIME: 0.12 SEC
BH CV ECHO MEAS - MV DEC TIME: 0.18 SEC
BH CV ECHO MEAS - MV E MAX VEL: 106 CM/SEC
BH CV ECHO MEAS - MV E MAX VEL: 83.9 CM/SEC
BH CV ECHO MEAS - MV E/A: 1.1
BH CV ECHO MEAS - MV E/A: 1.1
BH CV ECHO MEAS - MV MAX PG: 4.5 MMHG
BH CV ECHO MEAS - MV MEAN PG: 2 MMHG
BH CV ECHO MEAS - MV V2 MAX: 106 CM/SEC
BH CV ECHO MEAS - MV V2 MEAN: 70.1 CM/SEC
BH CV ECHO MEAS - MV V2 VTI: 37.4 CM
BH CV ECHO MEAS - MVA(VTI): 2.7 CM^2
BH CV ECHO MEAS - PA ACC SLOPE: 1138 CM/SEC^2
BH CV ECHO MEAS - PA ACC TIME: 0.07 SEC
BH CV ECHO MEAS - PA ACC TIME: 0.09 SEC
BH CV ECHO MEAS - PA MAX PG (FULL): 5.6 MMHG
BH CV ECHO MEAS - PA MAX PG: 7.4 MMHG
BH CV ECHO MEAS - PA MEAN PG (FULL): 2.5 MMHG
BH CV ECHO MEAS - PA MEAN PG: 3.5 MMHG
BH CV ECHO MEAS - PA PR(ACCEL): 39.9 MMHG
BH CV ECHO MEAS - PA PR(ACCEL): 47.5 MMHG
BH CV ECHO MEAS - PA V2 MAX: 135.5 CM/SEC
BH CV ECHO MEAS - PA V2 MEAN: 86.3 CM/SEC
BH CV ECHO MEAS - PA V2 VTI: 26.5 CM
BH CV ECHO MEAS - RAP SYSTOLE: 15 MMHG
BH CV ECHO MEAS - RAP SYSTOLE: 3 MMHG
BH CV ECHO MEAS - RV MAX PG: 1.8 MMHG
BH CV ECHO MEAS - RV MEAN PG: 1 MMHG
BH CV ECHO MEAS - RV V1 MAX: 67.1 CM/SEC
BH CV ECHO MEAS - RV V1 MEAN: 44.9 CM/SEC
BH CV ECHO MEAS - RV V1 VTI: 15.1 CM
BH CV ECHO MEAS - RVSP: 33 MMHG
BH CV ECHO MEAS - RVSP: 50 MMHG
BH CV ECHO MEAS - SI(AO): 155 ML/M^2
BH CV ECHO MEAS - SI(AO): 204.7 ML/M^2
BH CV ECHO MEAS - SI(CUBED): 24.2 ML/M^2
BH CV ECHO MEAS - SI(CUBED): 32 ML/M^2
BH CV ECHO MEAS - SI(LVOT): 43.9 ML/M^2
BH CV ECHO MEAS - SI(LVOT): 52.9 ML/M^2
BH CV ECHO MEAS - SI(MOD-SP2): 24.8 ML/M^2
BH CV ECHO MEAS - SI(MOD-SP2): 50.2 ML/M^2
BH CV ECHO MEAS - SI(MOD-SP4): 29 ML/M^2
BH CV ECHO MEAS - SI(MOD-SP4): 46.6 ML/M^2
BH CV ECHO MEAS - SI(TEICH): 14.7 ML/M^2
BH CV ECHO MEAS - SI(TEICH): 17.7 ML/M^2
BH CV ECHO MEAS - SV(AO): 294.2 ML
BH CV ECHO MEAS - SV(AO): 395.4 ML
BH CV ECHO MEAS - SV(CUBED): 46.7 ML
BH CV ECHO MEAS - SV(CUBED): 60.7 ML
BH CV ECHO MEAS - SV(LVOT): 102.2 ML
BH CV ECHO MEAS - SV(LVOT): 83.4 ML
BH CV ECHO MEAS - SV(MOD-SP2): 47 ML
BH CV ECHO MEAS - SV(MOD-SP2): 97 ML
BH CV ECHO MEAS - SV(MOD-SP4): 55 ML
BH CV ECHO MEAS - SV(MOD-SP4): 90 ML
BH CV ECHO MEAS - SV(TEICH): 28.4 ML
BH CV ECHO MEAS - SV(TEICH): 33.5 ML
BH CV ECHO MEAS - TAPSE (>1.6): 2.3 CM
BH CV ECHO MEAS - TAPSE (>1.6): 3.1 CM
BH CV ECHO MEAS - TR MAX PG: 30 MMHG
BH CV ECHO MEAS - TR MAX PG: 35 MMHG
BH CV ECHO MEAS - TR MAX VEL: 274 CM/SEC
BH CV ECHO MEAS - TR MAX VEL: 294 CM/SEC
BH CV ECHO MEASUREMENTS AVERAGE E/E' RATIO: 12.07
BH CV ECHO MEASUREMENTS AVERAGE E/E' RATIO: 12.18
BH CV XLRA - RV BASE: 3.9 CM
BH CV XLRA - RV BASE: 4.5 CM
BH CV XLRA - RV LENGTH: 5.9 CM
BH CV XLRA - RV LENGTH: 7.5 CM
BH CV XLRA - RV MID: 3.7 CM
BH CV XLRA - RV MID: 3.9 CM
BH CV XLRA - TDI S': 13.5 CM/SEC
BILIRUB SERPL-MCNC: 0.4 MG/DL (ref 0–1.2)
BILIRUB SERPL-MCNC: 0.6 MG/DL (ref 0–1.2)
BILIRUB SERPL-MCNC: 0.7 MG/DL (ref 0–1.2)
BILIRUB SERPL-MCNC: 0.8 MG/DL (ref 0–1.2)
BILIRUB SERPL-MCNC: 0.9 MG/DL (ref 0–1.2)
BILIRUB SERPL-MCNC: 1 MG/DL (ref 0–1.2)
BILIRUB SERPL-MCNC: 1.1 MG/DL (ref 0–1.2)
BILIRUB SERPL-MCNC: 1.4 MG/DL (ref 0–1.2)
BILIRUB UR QL STRIP: NEGATIVE
BLD GP AB SCN SERPL QL: NEGATIVE
BUN SERPL-MCNC: 101 MG/DL (ref 8–23)
BUN SERPL-MCNC: 104 MG/DL (ref 8–23)
BUN SERPL-MCNC: 32 MG/DL (ref 8–23)
BUN SERPL-MCNC: 34 MG/DL (ref 8–23)
BUN SERPL-MCNC: 36 MG/DL (ref 8–23)
BUN SERPL-MCNC: 40 MG/DL (ref 8–23)
BUN SERPL-MCNC: 41 MG/DL (ref 8–23)
BUN SERPL-MCNC: 43 MG/DL (ref 8–23)
BUN SERPL-MCNC: 43 MG/DL (ref 8–23)
BUN SERPL-MCNC: 44 MG/DL (ref 8–23)
BUN SERPL-MCNC: 44 MG/DL (ref 8–23)
BUN SERPL-MCNC: 46 MG/DL (ref 8–23)
BUN SERPL-MCNC: 47 MG/DL (ref 8–23)
BUN SERPL-MCNC: 49 MG/DL (ref 8–23)
BUN SERPL-MCNC: 49 MG/DL (ref 8–23)
BUN SERPL-MCNC: 54 MG/DL (ref 8–23)
BUN SERPL-MCNC: 74 MG/DL (ref 8–23)
BUN SERPL-MCNC: 80 MG/DL (ref 8–23)
BUN SERPL-MCNC: 88 MG/DL (ref 8–23)
BUN SERPL-MCNC: 88 MG/DL (ref 8–23)
BUN SERPL-MCNC: 90 MG/DL (ref 8–23)
BUN SERPL-MCNC: 92 MG/DL (ref 8–23)
BUN SERPL-MCNC: 95 MG/DL (ref 8–23)
BUN SERPL-MCNC: 96 MG/DL (ref 8–23)
BUN SERPL-MCNC: 99 MG/DL (ref 8–23)
BUN/CREAT SERPL: 19 (ref 7–25)
BUN/CREAT SERPL: 19.1 (ref 7–25)
BUN/CREAT SERPL: 19.3 (ref 7–25)
BUN/CREAT SERPL: 19.4 (ref 7–25)
BUN/CREAT SERPL: 20.7 (ref 7–25)
BUN/CREAT SERPL: 21.3 (ref 7–25)
BUN/CREAT SERPL: 21.9 (ref 7–25)
BUN/CREAT SERPL: 22.5 (ref 7–25)
BUN/CREAT SERPL: 22.7 (ref 7–25)
BUN/CREAT SERPL: 22.8 (ref 7–25)
BUN/CREAT SERPL: 23.4 (ref 7–25)
BUN/CREAT SERPL: 23.5 (ref 7–25)
BUN/CREAT SERPL: 23.9 (ref 7–25)
BUN/CREAT SERPL: 24.1 (ref 7–25)
BUN/CREAT SERPL: 24.7 (ref 7–25)
BUN/CREAT SERPL: 24.7 (ref 7–25)
BUN/CREAT SERPL: 26.9 (ref 7–25)
BUN/CREAT SERPL: 27.6 (ref 7–25)
BUN/CREAT SERPL: 28.4 (ref 7–25)
BUN/CREAT SERPL: 28.5 (ref 7–25)
BUN/CREAT SERPL: 29.9 (ref 7–25)
BUN/CREAT SERPL: 30 (ref 7–25)
BUN/CREAT SERPL: 31 (ref 7–25)
BUN/CREAT SERPL: 31.7 (ref 7–25)
BUN/CREAT SERPL: 31.7 (ref 7–25)
BUN/CREAT SERPL: 32.5 (ref 7–25)
BUN/CREAT SERPL: 32.7 (ref 7–25)
C PNEUM DNA NPH QL NAA+NON-PROBE: NOT DETECTED
CALCIUM SERPL-MCNC: 9.3 MG/DL (ref 8.6–10.5)
CALCIUM SERPL-MCNC: 9.9 MG/DL (ref 8.6–10.5)
CALCIUM SPEC-SCNC: 8.2 MG/DL (ref 8.6–10.5)
CALCIUM SPEC-SCNC: 8.4 MG/DL (ref 8.6–10.5)
CALCIUM SPEC-SCNC: 8.4 MG/DL (ref 8.6–10.5)
CALCIUM SPEC-SCNC: 8.5 MG/DL (ref 8.6–10.5)
CALCIUM SPEC-SCNC: 8.6 MG/DL (ref 8.6–10.5)
CALCIUM SPEC-SCNC: 8.6 MG/DL (ref 8.6–10.5)
CALCIUM SPEC-SCNC: 8.7 MG/DL (ref 8.6–10.5)
CALCIUM SPEC-SCNC: 8.7 MG/DL (ref 8.6–10.5)
CALCIUM SPEC-SCNC: 8.8 MG/DL (ref 8.6–10.5)
CALCIUM SPEC-SCNC: 8.8 MG/DL (ref 8.6–10.5)
CALCIUM SPEC-SCNC: 8.9 MG/DL (ref 8.6–10.5)
CALCIUM SPEC-SCNC: 9 MG/DL (ref 8.6–10.5)
CALCIUM SPEC-SCNC: 9.1 MG/DL (ref 8.6–10.5)
CALCIUM SPEC-SCNC: 9.3 MG/DL (ref 8.6–10.5)
CALCIUM SPEC-SCNC: 9.4 MG/DL (ref 8.6–10.5)
CALCIUM SPEC-SCNC: 9.5 MG/DL (ref 8.6–10.5)
CHLORIDE SERPL-SCNC: 100 MMOL/L (ref 98–107)
CHLORIDE SERPL-SCNC: 86 MMOL/L (ref 98–107)
CHLORIDE SERPL-SCNC: 88 MMOL/L (ref 98–107)
CHLORIDE SERPL-SCNC: 90 MMOL/L (ref 98–107)
CHLORIDE SERPL-SCNC: 91 MMOL/L (ref 98–107)
CHLORIDE SERPL-SCNC: 92 MMOL/L (ref 98–107)
CHLORIDE SERPL-SCNC: 92 MMOL/L (ref 98–107)
CHLORIDE SERPL-SCNC: 93 MMOL/L (ref 98–107)
CHLORIDE SERPL-SCNC: 94 MMOL/L (ref 98–107)
CHLORIDE SERPL-SCNC: 95 MMOL/L (ref 98–107)
CHLORIDE SERPL-SCNC: 96 MMOL/L (ref 98–107)
CHLORIDE SERPL-SCNC: 97 MMOL/L (ref 98–107)
CHLORIDE SERPL-SCNC: 98 MMOL/L (ref 98–107)
CHLORIDE SERPL-SCNC: 99 MMOL/L (ref 98–107)
CHLORIDE UR-SCNC: <20 MMOL/L
CHOLEST SERPL-MCNC: 183 MG/DL (ref 0–200)
CK SERPL-CCNC: 77 U/L (ref 20–200)
CK SERPL-CCNC: 87 U/L (ref 20–200)
CLARITY UR: ABNORMAL
CLARITY UR: CLEAR
CO2 SERPL-SCNC: 30.3 MMOL/L (ref 22–29)
CO2 SERPL-SCNC: 31.3 MMOL/L (ref 22–29)
CO2 SERPL-SCNC: 31.9 MMOL/L (ref 22–29)
CO2 SERPL-SCNC: 32.7 MMOL/L (ref 22–29)
CO2 SERPL-SCNC: 33.2 MMOL/L (ref 22–29)
CO2 SERPL-SCNC: 33.7 MMOL/L (ref 22–29)
CO2 SERPL-SCNC: 33.9 MMOL/L (ref 22–29)
CO2 SERPL-SCNC: 34.1 MMOL/L (ref 22–29)
CO2 SERPL-SCNC: 34.2 MMOL/L (ref 22–29)
CO2 SERPL-SCNC: 35 MMOL/L (ref 22–29)
CO2 SERPL-SCNC: 35.2 MMOL/L (ref 22–29)
CO2 SERPL-SCNC: 35.2 MMOL/L (ref 22–29)
CO2 SERPL-SCNC: 35.5 MMOL/L (ref 22–29)
CO2 SERPL-SCNC: 35.8 MMOL/L (ref 22–29)
CO2 SERPL-SCNC: 35.9 MMOL/L (ref 22–29)
CO2 SERPL-SCNC: 36.2 MMOL/L (ref 22–29)
CO2 SERPL-SCNC: 36.4 MMOL/L (ref 22–29)
CO2 SERPL-SCNC: 37.1 MMOL/L (ref 22–29)
CO2 SERPL-SCNC: 38.2 MMOL/L (ref 22–29)
CO2 SERPL-SCNC: 38.2 MMOL/L (ref 22–29)
CO2 SERPL-SCNC: 39.6 MMOL/L (ref 22–29)
CO2 SERPL-SCNC: 39.9 MMOL/L (ref 22–29)
CO2 SERPL-SCNC: 40.7 MMOL/L (ref 22–29)
CO2 SERPL-SCNC: 41 MMOL/L (ref 22–29)
CO2 SERPL-SCNC: 42.3 MMOL/L (ref 22–29)
CO2 SERPL-SCNC: 43.7 MMOL/L (ref 22–29)
CO2 SERPL-SCNC: 44.3 MMOL/L (ref 22–29)
COHGB MFR BLD: 1 % (ref 0–2)
COHGB MFR BLD: 1.4 % (ref 0–2)
COHGB MFR BLD: 1.6 % (ref 0–2)
COLLECT DURATION TIME UR: 24 HRS
COLLECT DURATION TIME UR: 24 HRS
COLOR UR: YELLOW
CREAT CL 24H UR+SERPL-VRATE: 25 ML/MIN (ref 97–137)
CREAT CL 24H UR+SERPL-VRATE: 35.9 L/24 HR (ref 139.7–197.3)
CREAT SERPL-MCNC: 1.56 MG/DL (ref 0.76–1.27)
CREAT SERPL-MCNC: 1.65 MG/DL (ref 0.76–1.27)
CREAT SERPL-MCNC: 1.74 MG/DL (ref 0.76–1.27)
CREAT SERPL-MCNC: 1.75 MG/DL (ref 0.76–1.27)
CREAT SERPL-MCNC: 1.76 MG/DL (ref 0.76–1.27)
CREAT SERPL-MCNC: 1.76 MG/DL (ref 0.76–1.27)
CREAT SERPL-MCNC: 1.8 MG/DL (ref 0.76–1.27)
CREAT SERPL-MCNC: 1.86 MG/DL (ref 0.76–1.27)
CREAT SERPL-MCNC: 1.91 MG/DL (ref 0.76–1.27)
CREAT SERPL-MCNC: 1.93 MG/DL (ref 0.76–1.27)
CREAT SERPL-MCNC: 2 MG/DL (ref 0.76–1.27)
CREAT SERPL-MCNC: 2.05 MG/DL (ref 0.76–1.27)
CREAT SERPL-MCNC: 2.07 MG/DL (ref 0.76–1.27)
CREAT SERPL-MCNC: 2.09 MG/DL (ref 0.76–1.27)
CREAT SERPL-MCNC: 2.1 MG/DL (ref 0.76–1.27)
CREAT SERPL-MCNC: 2.24 MG/DL (ref 0.76–1.27)
CREAT SERPL-MCNC: 2.28 MG/DL (ref 0.76–1.27)
CREAT SERPL-MCNC: 2.45 MG/DL (ref 0.76–1.27)
CREAT SERPL-MCNC: 2.84 MG/DL (ref 0.76–1.27)
CREAT SERPL-MCNC: 3.03 MG/DL (ref 0.76–1.27)
CREAT SERPL-MCNC: 3.16 MG/DL (ref 0.76–1.27)
CREAT SERPL-MCNC: 3.27 MG/DL (ref 0.76–1.27)
CREAT SERPL-MCNC: 3.28 MG/DL (ref 0.76–1.27)
CREAT SERPL-MCNC: 3.38 MG/DL (ref 0.76–1.27)
CREAT SERPL-MCNC: 3.49 MG/DL (ref 0.76–1.27)
CREAT SERPL-MCNC: 3.81 MG/DL (ref 0.76–1.27)
CREAT SERPL-MCNC: 3.85 MG/DL (ref 0.76–1.27)
CREAT UR-MCNC: 150.5 MG/DL
CREAT UR-MCNC: 34.7 MG/DL
CREAT UR-MCNC: 34.7 MG/DL
CREATINE 24H UR-MRATE: 0.98 G/24 HR (ref 1–2.4)
CREATINE 24H UR-MRATE: 0.98 G/24 HR (ref 1–2.4)
CROSSMATCH INTERPRETATION: NORMAL
D-LACTATE SERPL-SCNC: 1.5 MMOL/L (ref 0.5–2)
DEPRECATED RDW RBC AUTO: 42.5 FL (ref 37–54)
DEPRECATED RDW RBC AUTO: 43.5 FL (ref 37–54)
DEPRECATED RDW RBC AUTO: 44 FL (ref 37–54)
DEPRECATED RDW RBC AUTO: 44.6 FL (ref 37–54)
DEPRECATED RDW RBC AUTO: 45 FL (ref 37–54)
DEPRECATED RDW RBC AUTO: 45.2 FL (ref 37–54)
DEPRECATED RDW RBC AUTO: 45.6 FL (ref 37–54)
DEPRECATED RDW RBC AUTO: 46 FL (ref 37–54)
DEPRECATED RDW RBC AUTO: 47.8 FL (ref 37–54)
DEPRECATED RDW RBC AUTO: 47.8 FL (ref 37–54)
DEPRECATED RDW RBC AUTO: 48.1 FL (ref 37–54)
DEPRECATED RDW RBC AUTO: 48.7 FL (ref 37–54)
DEPRECATED RDW RBC AUTO: 49.1 FL (ref 37–54)
DEPRECATED RDW RBC AUTO: 49.2 FL (ref 37–54)
DEPRECATED RDW RBC AUTO: 49.3 FL (ref 37–54)
DEPRECATED RDW RBC AUTO: 49.9 FL (ref 37–54)
DEPRECATED RDW RBC AUTO: 50 FL (ref 37–54)
DEPRECATED RDW RBC AUTO: 50 FL (ref 37–54)
DEPRECATED RDW RBC AUTO: 50.2 FL (ref 37–54)
DEPRECATED RDW RBC AUTO: 50.3 FL (ref 37–54)
DEPRECATED RDW RBC AUTO: 50.4 FL (ref 37–54)
DEPRECATED RDW RBC AUTO: 50.5 FL (ref 37–54)
DEPRECATED RDW RBC AUTO: 51.9 FL (ref 37–54)
DEPRECATED RDW RBC AUTO: 53.5 FL (ref 37–54)
EOSINOPHIL # BLD AUTO: 0 10*3/MM3 (ref 0–0.4)
EOSINOPHIL # BLD AUTO: 0.01 10*3/MM3 (ref 0–0.4)
EOSINOPHIL # BLD AUTO: 0.02 10*3/MM3 (ref 0–0.4)
EOSINOPHIL # BLD AUTO: 0.02 10*3/MM3 (ref 0–0.4)
EOSINOPHIL # BLD AUTO: 0.03 10*3/MM3 (ref 0–0.4)
EOSINOPHIL # BLD AUTO: 0.04 10*3/MM3 (ref 0–0.4)
EOSINOPHIL # BLD AUTO: 0.05 10*3/MM3 (ref 0–0.4)
EOSINOPHIL # BLD AUTO: 0.06 10*3/MM3 (ref 0–0.4)
EOSINOPHIL # BLD AUTO: 0.1 10*3/MM3 (ref 0–0.4)
EOSINOPHIL # BLD AUTO: 0.12 10*3/MM3 (ref 0–0.4)
EOSINOPHIL # BLD AUTO: 0.13 10*3/MM3 (ref 0–0.4)
EOSINOPHIL # BLD AUTO: 0.15 10*3/MM3 (ref 0–0.4)
EOSINOPHIL # BLD AUTO: 0.19 10*3/MM3 (ref 0–0.4)
EOSINOPHIL # BLD AUTO: 0.2 10*3/MM3 (ref 0–0.4)
EOSINOPHIL # BLD AUTO: 0.22 10*3/MM3 (ref 0–0.4)
EOSINOPHIL NFR BLD AUTO: 0 % (ref 0.3–6.2)
EOSINOPHIL NFR BLD AUTO: 0.2 % (ref 0.3–6.2)
EOSINOPHIL NFR BLD AUTO: 0.2 % (ref 0.3–6.2)
EOSINOPHIL NFR BLD AUTO: 0.3 % (ref 0.3–6.2)
EOSINOPHIL NFR BLD AUTO: 0.5 % (ref 0.3–6.2)
EOSINOPHIL NFR BLD AUTO: 0.5 % (ref 0.3–6.2)
EOSINOPHIL NFR BLD AUTO: 0.6 % (ref 0.3–6.2)
EOSINOPHIL NFR BLD AUTO: 0.8 % (ref 0.3–6.2)
EOSINOPHIL NFR BLD AUTO: 1 % (ref 0.3–6.2)
EOSINOPHIL NFR BLD AUTO: 1.2 % (ref 0.3–6.2)
EOSINOPHIL NFR BLD AUTO: 1.4 % (ref 0.3–6.2)
EOSINOPHIL NFR BLD AUTO: 1.7 % (ref 0.3–6.2)
EOSINOPHIL NFR BLD AUTO: 1.7 % (ref 0.3–6.2)
EOSINOPHIL NFR BLD AUTO: 2.2 % (ref 0.3–6.2)
EOSINOPHIL NFR BLD AUTO: 2.6 % (ref 0.3–6.2)
EOSINOPHIL NFR BLD AUTO: 2.6 % (ref 0.3–6.2)
ERYTHROCYTE [DISTWIDTH] IN BLOOD BY AUTOMATED COUNT: 11.8 % (ref 12.3–15.4)
ERYTHROCYTE [DISTWIDTH] IN BLOOD BY AUTOMATED COUNT: 12 % (ref 12.3–15.4)
ERYTHROCYTE [DISTWIDTH] IN BLOOD BY AUTOMATED COUNT: 12.1 % (ref 12.3–15.4)
ERYTHROCYTE [DISTWIDTH] IN BLOOD BY AUTOMATED COUNT: 12.2 % (ref 12.3–15.4)
ERYTHROCYTE [DISTWIDTH] IN BLOOD BY AUTOMATED COUNT: 12.2 % (ref 12.3–15.4)
ERYTHROCYTE [DISTWIDTH] IN BLOOD BY AUTOMATED COUNT: 12.3 % (ref 12.3–15.4)
ERYTHROCYTE [DISTWIDTH] IN BLOOD BY AUTOMATED COUNT: 12.3 % (ref 12.3–15.4)
ERYTHROCYTE [DISTWIDTH] IN BLOOD BY AUTOMATED COUNT: 12.4 % (ref 12.3–15.4)
ERYTHROCYTE [DISTWIDTH] IN BLOOD BY AUTOMATED COUNT: 12.9 % (ref 12.3–15.4)
ERYTHROCYTE [DISTWIDTH] IN BLOOD BY AUTOMATED COUNT: 13 % (ref 12.3–15.4)
ERYTHROCYTE [DISTWIDTH] IN BLOOD BY AUTOMATED COUNT: 13.1 % (ref 12.3–15.4)
ERYTHROCYTE [DISTWIDTH] IN BLOOD BY AUTOMATED COUNT: 13.2 % (ref 12.3–15.4)
ERYTHROCYTE [DISTWIDTH] IN BLOOD BY AUTOMATED COUNT: 13.3 % (ref 12.3–15.4)
ERYTHROCYTE [DISTWIDTH] IN BLOOD BY AUTOMATED COUNT: 13.3 % (ref 12.3–15.4)
ERYTHROCYTE [DISTWIDTH] IN BLOOD BY AUTOMATED COUNT: 13.6 % (ref 12.3–15.4)
ERYTHROCYTE [DISTWIDTH] IN BLOOD BY AUTOMATED COUNT: 14 % (ref 12.3–15.4)
ERYTHROCYTE [DISTWIDTH] IN BLOOD BY AUTOMATED COUNT: 14.2 % (ref 12.3–15.4)
ERYTHROCYTE [DISTWIDTH] IN BLOOD BY AUTOMATED COUNT: 14.2 % (ref 12.3–15.4)
ERYTHROCYTE [DISTWIDTH] IN BLOOD BY AUTOMATED COUNT: 14.3 % (ref 12.3–15.4)
ERYTHROCYTE [DISTWIDTH] IN BLOOD BY AUTOMATED COUNT: 14.4 % (ref 12.3–15.4)
ERYTHROCYTE [DISTWIDTH] IN BLOOD BY AUTOMATED COUNT: 14.5 % (ref 12.3–15.4)
ERYTHROCYTE [DISTWIDTH] IN BLOOD BY AUTOMATED COUNT: 14.7 % (ref 12.3–15.4)
ERYTHROCYTE [DISTWIDTH] IN BLOOD BY AUTOMATED COUNT: 14.9 % (ref 12.3–15.4)
ERYTHROCYTE [DISTWIDTH] IN BLOOD BY AUTOMATED COUNT: 15 % (ref 12.3–15.4)
FERRITIN SERPL-MCNC: 198 NG/ML (ref 30–400)
FLUAV H1 2009 PAND RNA NPH QL NAA+PROBE: NOT DETECTED
FLUAV H1 HA GENE NPH QL NAA+PROBE: NOT DETECTED
FLUAV H3 RNA NPH QL NAA+PROBE: NOT DETECTED
FLUAV SUBTYP SPEC NAA+PROBE: NOT DETECTED
FLUBV RNA ISLT QL NAA+PROBE: NOT DETECTED
FOLATE SERPL-MCNC: 12 NG/ML
GAS FLOW AIRWAY: 5 LPM
GFR SERPL CREATININE-BSD FRML MDRD: 16 ML/MIN/1.73
GFR SERPL CREATININE-BSD FRML MDRD: 16 ML/MIN/1.73
GFR SERPL CREATININE-BSD FRML MDRD: 17 ML/MIN/1.73
GFR SERPL CREATININE-BSD FRML MDRD: 18 ML/MIN/1.73
GFR SERPL CREATININE-BSD FRML MDRD: 19 ML/MIN/1.73
GFR SERPL CREATININE-BSD FRML MDRD: 19 ML/MIN/1.73
GFR SERPL CREATININE-BSD FRML MDRD: 20 ML/MIN/1.73
GFR SERPL CREATININE-BSD FRML MDRD: 20 ML/MIN/1.73
GFR SERPL CREATININE-BSD FRML MDRD: 22 ML/MIN/1.73
GFR SERPL CREATININE-BSD FRML MDRD: 26 ML/MIN/1.73
GFR SERPL CREATININE-BSD FRML MDRD: 28 ML/MIN/1.73
GFR SERPL CREATININE-BSD FRML MDRD: 29 ML/MIN/1.73
GFR SERPL CREATININE-BSD FRML MDRD: 32 ML/MIN/1.73
GFR SERPL CREATININE-BSD FRML MDRD: 33 ML/MIN/1.73
GFR SERPL CREATININE-BSD FRML MDRD: 34 ML/MIN/1.73
GFR SERPL CREATININE-BSD FRML MDRD: 35 ML/MIN/1.73
GFR SERPL CREATININE-BSD FRML MDRD: 36 ML/MIN/1.73
GFR SERPL CREATININE-BSD FRML MDRD: 37 ML/MIN/1.73
GFR SERPL CREATININE-BSD FRML MDRD: 38 ML/MIN/1.73
GFR SERPL CREATININE-BSD FRML MDRD: 39 ML/MIN/1.73
GFR SERPL CREATININE-BSD FRML MDRD: 39 ML/MIN/1.73
GFR SERPL CREATININE-BSD FRML MDRD: 41 ML/MIN/1.73
GFR SERPL CREATININE-BSD FRML MDRD: 44 ML/MIN/1.73
GLOBULIN SER CALC-MCNC: 1.9 GM/DL
GLOBULIN SER CALC-MCNC: 2 GM/DL
GLOBULIN UR ELPH-MCNC: 2.2 GM/DL
GLOBULIN UR ELPH-MCNC: 2.5 GM/DL
GLOBULIN UR ELPH-MCNC: 2.6 GM/DL
GLOBULIN UR ELPH-MCNC: 2.6 GM/DL
GLOBULIN UR ELPH-MCNC: 2.7 GM/DL
GLOBULIN UR ELPH-MCNC: 2.7 GM/DL
GLOBULIN UR ELPH-MCNC: 2.8 GM/DL
GLOBULIN UR ELPH-MCNC: 2.8 GM/DL
GLOBULIN UR ELPH-MCNC: 3 GM/DL
GLOBULIN UR ELPH-MCNC: 3.1 GM/DL
GLOBULIN UR ELPH-MCNC: 3.1 GM/DL
GLOBULIN UR ELPH-MCNC: 3.2 GM/DL
GLOBULIN UR ELPH-MCNC: 3.2 GM/DL
GLOBULIN UR ELPH-MCNC: 3.3 GM/DL
GLUCOSE SERPL-MCNC: 100 MG/DL (ref 65–99)
GLUCOSE SERPL-MCNC: 103 MG/DL (ref 65–99)
GLUCOSE SERPL-MCNC: 106 MG/DL (ref 65–99)
GLUCOSE SERPL-MCNC: 110 MG/DL (ref 65–99)
GLUCOSE SERPL-MCNC: 113 MG/DL (ref 65–99)
GLUCOSE SERPL-MCNC: 113 MG/DL (ref 65–99)
GLUCOSE SERPL-MCNC: 118 MG/DL (ref 65–99)
GLUCOSE SERPL-MCNC: 121 MG/DL (ref 65–99)
GLUCOSE SERPL-MCNC: 129 MG/DL (ref 65–99)
GLUCOSE SERPL-MCNC: 130 MG/DL (ref 65–99)
GLUCOSE SERPL-MCNC: 131 MG/DL (ref 65–99)
GLUCOSE SERPL-MCNC: 131 MG/DL (ref 65–99)
GLUCOSE SERPL-MCNC: 133 MG/DL (ref 65–99)
GLUCOSE SERPL-MCNC: 135 MG/DL (ref 65–99)
GLUCOSE SERPL-MCNC: 139 MG/DL (ref 65–99)
GLUCOSE SERPL-MCNC: 140 MG/DL (ref 65–99)
GLUCOSE SERPL-MCNC: 185 MG/DL (ref 65–99)
GLUCOSE SERPL-MCNC: 68 MG/DL (ref 65–99)
GLUCOSE SERPL-MCNC: 75 MG/DL (ref 65–99)
GLUCOSE SERPL-MCNC: 84 MG/DL (ref 65–99)
GLUCOSE SERPL-MCNC: 84 MG/DL (ref 65–99)
GLUCOSE SERPL-MCNC: 87 MG/DL (ref 65–99)
GLUCOSE SERPL-MCNC: 87 MG/DL (ref 65–99)
GLUCOSE SERPL-MCNC: 94 MG/DL (ref 65–99)
GLUCOSE SERPL-MCNC: 96 MG/DL (ref 65–99)
GLUCOSE SERPL-MCNC: 97 MG/DL (ref 65–99)
GLUCOSE SERPL-MCNC: 98 MG/DL (ref 65–99)
GLUCOSE UR QL: NEGATIVE
GLUCOSE UR STRIP-MCNC: NEGATIVE MG/DL
GRAM STN SPEC: ABNORMAL
HADV DNA SPEC NAA+PROBE: NOT DETECTED
HBA1C MFR BLD: 5.3 % (ref 4.8–5.6)
HCO3 BLDA-SCNC: 35.2 MMOL/L (ref 22–28)
HCO3 BLDA-SCNC: 35.6 MMOL/L (ref 22–28)
HCO3 BLDA-SCNC: 37.2 MMOL/L (ref 22–28)
HCO3 BLDA-SCNC: 38 MMOL/L (ref 22–28)
HCO3 BLDA-SCNC: 38.9 MMOL/L (ref 22–28)
HCO3 BLDA-SCNC: 40.6 MMOL/L (ref 22–28)
HCOV 229E RNA SPEC QL NAA+PROBE: NOT DETECTED
HCOV HKU1 RNA SPEC QL NAA+PROBE: NOT DETECTED
HCOV NL63 RNA SPEC QL NAA+PROBE: NOT DETECTED
HCOV OC43 RNA SPEC QL NAA+PROBE: NOT DETECTED
HCT VFR BLD AUTO: 24.6 % (ref 37.5–51)
HCT VFR BLD AUTO: 25.2 % (ref 37.5–51)
HCT VFR BLD AUTO: 25.5 % (ref 37.5–51)
HCT VFR BLD AUTO: 25.7 % (ref 37.5–51)
HCT VFR BLD AUTO: 25.9 % (ref 37.5–51)
HCT VFR BLD AUTO: 26.3 % (ref 37.5–51)
HCT VFR BLD AUTO: 26.3 % (ref 37.5–51)
HCT VFR BLD AUTO: 27.3 % (ref 37.5–51)
HCT VFR BLD AUTO: 28.6 % (ref 37.5–51)
HCT VFR BLD AUTO: 29.7 % (ref 37.5–51)
HCT VFR BLD AUTO: 30.7 % (ref 37.5–51)
HCT VFR BLD AUTO: 30.9 % (ref 37.5–51)
HCT VFR BLD AUTO: 32.7 % (ref 37.5–51)
HCT VFR BLD AUTO: 32.8 % (ref 37.5–51)
HCT VFR BLD AUTO: 33.5 % (ref 37.5–51)
HCT VFR BLD AUTO: 34.8 % (ref 37.5–51)
HCT VFR BLD AUTO: 35.1 % (ref 37.5–51)
HCT VFR BLD AUTO: 35.8 % (ref 37.5–51)
HCT VFR BLD AUTO: 36 % (ref 37.5–51)
HCT VFR BLD AUTO: 36 % (ref 37.5–51)
HCT VFR BLD AUTO: 36.9 % (ref 37.5–51)
HCT VFR BLD AUTO: 37 % (ref 37.5–51)
HCT VFR BLD AUTO: 37.5 % (ref 37.5–51)
HCT VFR BLD AUTO: 40.4 % (ref 37.5–51)
HCT VFR BLD AUTO: 40.4 % (ref 37.5–51)
HCT VFR BLD AUTO: 41.7 % (ref 37.5–51)
HCT VFR BLD CALC: 26.6 %
HCT VFR BLD CALC: 30 %
HCT VFR BLD CALC: 37 %
HCT VFR BLD CALC: 38.3 %
HCV AB S/CO SERPL IA: <0.1 S/CO RATIO (ref 0–0.9)
HDLC SERPL-MCNC: 60 MG/DL (ref 40–60)
HEMOCCULT STL QL: NEGATIVE
HGB BLD-MCNC: 10.1 G/DL (ref 13–17.7)
HGB BLD-MCNC: 10.3 G/DL (ref 13–17.7)
HGB BLD-MCNC: 10.9 G/DL (ref 13–17.7)
HGB BLD-MCNC: 11.1 G/DL (ref 13–17.7)
HGB BLD-MCNC: 11.3 G/DL (ref 13–17.7)
HGB BLD-MCNC: 11.6 G/DL (ref 13–17.7)
HGB BLD-MCNC: 11.7 G/DL (ref 13–17.7)
HGB BLD-MCNC: 12 G/DL (ref 13–17.7)
HGB BLD-MCNC: 12.3 G/DL (ref 13–17.7)
HGB BLD-MCNC: 12.9 G/DL (ref 13–17.7)
HGB BLD-MCNC: 7.4 G/DL (ref 13–17.7)
HGB BLD-MCNC: 7.6 G/DL (ref 13–17.7)
HGB BLD-MCNC: 7.7 G/DL (ref 13–17.7)
HGB BLD-MCNC: 7.7 G/DL (ref 13–17.7)
HGB BLD-MCNC: 7.8 G/DL (ref 13–17.7)
HGB BLD-MCNC: 7.8 G/DL (ref 13–17.7)
HGB BLD-MCNC: 7.9 G/DL (ref 13–17.7)
HGB BLD-MCNC: 8.2 G/DL (ref 13–17.7)
HGB BLD-MCNC: 8.8 G/DL (ref 13–17.7)
HGB BLD-MCNC: 9.1 G/DL (ref 13–17.7)
HGB BLD-MCNC: 9.3 G/DL (ref 13–17.7)
HGB BLD-MCNC: 9.4 G/DL (ref 13–17.7)
HGB BLD-MCNC: 9.6 G/DL (ref 13–17.7)
HGB BLD-MCNC: 9.6 G/DL (ref 13–17.7)
HGB UR QL STRIP.AUTO: ABNORMAL
HGB UR QL STRIP.AUTO: NEGATIVE
HGB UR QL STRIP: NEGATIVE
HMPV RNA NPH QL NAA+NON-PROBE: NOT DETECTED
HOLD SPECIMEN: NORMAL
HPIV1 RNA SPEC QL NAA+PROBE: NOT DETECTED
HPIV2 RNA SPEC QL NAA+PROBE: NOT DETECTED
HPIV3 RNA NPH QL NAA+PROBE: NOT DETECTED
HPIV4 P GENE NPH QL NAA+PROBE: NOT DETECTED
HYALINE CASTS UR QL AUTO: ABNORMAL /LPF
HYPOCHROMIA BLD QL: NORMAL
IMM GRANULOCYTES # BLD AUTO: 0.02 10*3/MM3 (ref 0–0.05)
IMM GRANULOCYTES # BLD AUTO: 0.02 10*3/MM3 (ref 0–0.05)
IMM GRANULOCYTES # BLD AUTO: 0.03 10*3/MM3 (ref 0–0.05)
IMM GRANULOCYTES # BLD AUTO: 0.04 10*3/MM3 (ref 0–0.05)
IMM GRANULOCYTES # BLD AUTO: 0.04 10*3/MM3 (ref 0–0.05)
IMM GRANULOCYTES # BLD AUTO: 0.06 10*3/MM3 (ref 0–0.05)
IMM GRANULOCYTES # BLD AUTO: 0.07 10*3/MM3 (ref 0–0.05)
IMM GRANULOCYTES # BLD AUTO: 0.07 10*3/MM3 (ref 0–0.05)
IMM GRANULOCYTES # BLD AUTO: 0.1 10*3/MM3 (ref 0–0.05)
IMM GRANULOCYTES # BLD AUTO: 0.14 10*3/MM3 (ref 0–0.05)
IMM GRANULOCYTES # BLD AUTO: 0.17 10*3/MM3 (ref 0–0.05)
IMM GRANULOCYTES # BLD AUTO: 0.21 10*3/MM3 (ref 0–0.05)
IMM GRANULOCYTES # BLD AUTO: 0.21 10*3/MM3 (ref 0–0.05)
IMM GRANULOCYTES # BLD AUTO: 0.29 10*3/MM3 (ref 0–0.05)
IMM GRANULOCYTES NFR BLD AUTO: 0.2 % (ref 0–0.5)
IMM GRANULOCYTES NFR BLD AUTO: 0.3 % (ref 0–0.5)
IMM GRANULOCYTES NFR BLD AUTO: 0.3 % (ref 0–0.5)
IMM GRANULOCYTES NFR BLD AUTO: 0.4 % (ref 0–0.5)
IMM GRANULOCYTES NFR BLD AUTO: 0.5 % (ref 0–0.5)
IMM GRANULOCYTES NFR BLD AUTO: 0.6 % (ref 0–0.5)
IMM GRANULOCYTES NFR BLD AUTO: 0.8 % (ref 0–0.5)
IMM GRANULOCYTES NFR BLD AUTO: 0.8 % (ref 0–0.5)
IMM GRANULOCYTES NFR BLD AUTO: 1.3 % (ref 0–0.5)
IMM GRANULOCYTES NFR BLD AUTO: 1.4 % (ref 0–0.5)
IMM GRANULOCYTES NFR BLD AUTO: 1.4 % (ref 0–0.5)
IMM GRANULOCYTES NFR BLD AUTO: 1.6 % (ref 0–0.5)
IMM GRANULOCYTES NFR BLD AUTO: 1.8 % (ref 0–0.5)
INHALED O2 CONCENTRATION: 40 %
INHALED O2 CONCENTRATION: 50 %
INHALED O2 CONCENTRATION: 50 %
INHALED O2 CONCENTRATION: 80 %
INR PPP: 1 (ref 0.85–1.16)
INR PPP: 1.19 (ref 0.9–1.1)
IRON 24H UR-MRATE: 30 MCG/DL (ref 59–158)
IRON SATN MFR SERPL: 12 % (ref 20–50)
IRON SATN MFR SERPL: 14 % (ref 15–55)
IRON SERPL-MCNC: 40 UG/DL (ref 38–169)
ISOLATED FROM: ABNORMAL
KETONES UR QL STRIP: NEGATIVE
LDLC SERPL CALC-MCNC: 98 MG/DL (ref 0–100)
LEFT ATRIUM VOLUME INDEX: 35.8 ML/M^2
LEFT ATRIUM VOLUME INDEX: 45 ML/M^2
LEFT ATRIUM VOLUME: 68 ML
LEFT ATRIUM VOLUME: 86.9 ML
LEUKOCYTE ESTERASE UR QL STRIP.AUTO: ABNORMAL
LEUKOCYTE ESTERASE UR QL STRIP: NEGATIVE
LIPASE SERPL-CCNC: 38 U/L (ref 13–60)
LV EF 2D ECHO EST: 25 %
LYMPHOCYTES # BLD AUTO: 0.26 10*3/MM3 (ref 0.7–3.1)
LYMPHOCYTES # BLD AUTO: 0.31 10*3/MM3 (ref 0.7–3.1)
LYMPHOCYTES # BLD AUTO: 0.31 10*3/MM3 (ref 0.7–3.1)
LYMPHOCYTES # BLD AUTO: 0.34 10*3/MM3 (ref 0.7–3.1)
LYMPHOCYTES # BLD AUTO: 0.35 10*3/MM3 (ref 0.7–3.1)
LYMPHOCYTES # BLD AUTO: 0.46 10*3/MM3 (ref 0.7–3.1)
LYMPHOCYTES # BLD AUTO: 0.49 10*3/MM3 (ref 0.7–3.1)
LYMPHOCYTES # BLD AUTO: 0.5 10*3/MM3 (ref 0.7–3.1)
LYMPHOCYTES # BLD AUTO: 0.54 10*3/MM3 (ref 0.7–3.1)
LYMPHOCYTES # BLD AUTO: 0.56 10*3/MM3 (ref 0.7–3.1)
LYMPHOCYTES # BLD AUTO: 0.56 10*3/MM3 (ref 0.7–3.1)
LYMPHOCYTES # BLD AUTO: 0.59 10*3/MM3 (ref 0.7–3.1)
LYMPHOCYTES # BLD AUTO: 0.63 10*3/MM3 (ref 0.7–3.1)
LYMPHOCYTES # BLD AUTO: 0.66 10*3/MM3 (ref 0.7–3.1)
LYMPHOCYTES # BLD AUTO: 0.67 10*3/MM3 (ref 0.7–3.1)
LYMPHOCYTES # BLD AUTO: 0.69 10*3/MM3 (ref 0.7–3.1)
LYMPHOCYTES # BLD AUTO: 0.73 10*3/MM3 (ref 0.7–3.1)
LYMPHOCYTES # BLD AUTO: 0.77 10*3/MM3 (ref 0.7–3.1)
LYMPHOCYTES # BLD AUTO: 0.91 10*3/MM3 (ref 0.7–3.1)
LYMPHOCYTES # BLD AUTO: 0.95 10*3/MM3 (ref 0.7–3.1)
LYMPHOCYTES # BLD AUTO: 1.45 10*3/MM3 (ref 0.7–3.1)
LYMPHOCYTES # BLD MANUAL: 0.39 10*3/MM3 (ref 0.7–3.1)
LYMPHOCYTES NFR BLD AUTO: 11.3 % (ref 19.6–45.3)
LYMPHOCYTES NFR BLD AUTO: 19.7 % (ref 19.6–45.3)
LYMPHOCYTES NFR BLD AUTO: 2.4 % (ref 19.6–45.3)
LYMPHOCYTES NFR BLD AUTO: 2.7 % (ref 19.6–45.3)
LYMPHOCYTES NFR BLD AUTO: 2.8 % (ref 19.6–45.3)
LYMPHOCYTES NFR BLD AUTO: 3.6 % (ref 19.6–45.3)
LYMPHOCYTES NFR BLD AUTO: 4.6 % (ref 19.6–45.3)
LYMPHOCYTES NFR BLD AUTO: 4.7 % (ref 19.6–45.3)
LYMPHOCYTES NFR BLD AUTO: 4.7 % (ref 19.6–45.3)
LYMPHOCYTES NFR BLD AUTO: 4.9 % (ref 19.6–45.3)
LYMPHOCYTES NFR BLD AUTO: 5 % (ref 19.6–45.3)
LYMPHOCYTES NFR BLD AUTO: 5.1 % (ref 19.6–45.3)
LYMPHOCYTES NFR BLD AUTO: 5.5 % (ref 19.6–45.3)
LYMPHOCYTES NFR BLD AUTO: 5.7 % (ref 19.6–45.3)
LYMPHOCYTES NFR BLD AUTO: 6.1 % (ref 19.6–45.3)
LYMPHOCYTES NFR BLD AUTO: 6.3 % (ref 19.6–45.3)
LYMPHOCYTES NFR BLD AUTO: 6.7 % (ref 19.6–45.3)
LYMPHOCYTES NFR BLD AUTO: 6.9 % (ref 19.6–45.3)
LYMPHOCYTES NFR BLD AUTO: 7.5 % (ref 19.6–45.3)
LYMPHOCYTES NFR BLD AUTO: 7.7 % (ref 19.6–45.3)
LYMPHOCYTES NFR BLD AUTO: 8.4 % (ref 19.6–45.3)
LYMPHOCYTES NFR BLD MANUAL: 2 % (ref 19.6–45.3)
LYMPHOCYTES NFR BLD MANUAL: 6 % (ref 5–12)
Lab: ABNORMAL
M PNEUMO IGG SER IA-ACNC: NOT DETECTED
MAGNESIUM SERPL-MCNC: 2.4 MG/DL (ref 1.6–2.4)
MAGNESIUM SERPL-MCNC: 2.6 MG/DL (ref 1.6–2.4)
MAGNESIUM SERPL-MCNC: 2.8 MG/DL (ref 1.6–2.4)
MAGNESIUM SERPL-MCNC: 2.9 MG/DL (ref 1.6–2.4)
MAGNESIUM SERPL-MCNC: 2.9 MG/DL (ref 1.6–2.4)
MAGNESIUM SERPL-MCNC: 3 MG/DL (ref 1.6–2.4)
MCH RBC QN AUTO: 28.3 PG (ref 26.6–33)
MCH RBC QN AUTO: 28.3 PG (ref 26.6–33)
MCH RBC QN AUTO: 28.6 PG (ref 26.6–33)
MCH RBC QN AUTO: 28.7 PG (ref 26.6–33)
MCH RBC QN AUTO: 28.7 PG (ref 26.6–33)
MCH RBC QN AUTO: 28.8 PG (ref 26.6–33)
MCH RBC QN AUTO: 28.9 PG (ref 26.6–33)
MCH RBC QN AUTO: 29 PG (ref 26.6–33)
MCH RBC QN AUTO: 29 PG (ref 26.6–33)
MCH RBC QN AUTO: 29.1 PG (ref 26.6–33)
MCH RBC QN AUTO: 29.2 PG (ref 26.6–33)
MCH RBC QN AUTO: 29.2 PG (ref 26.6–33)
MCH RBC QN AUTO: 29.3 PG (ref 26.6–33)
MCH RBC QN AUTO: 29.5 PG (ref 26.6–33)
MCH RBC QN AUTO: 29.9 PG (ref 26.6–33)
MCH RBC QN AUTO: 29.9 PG (ref 26.6–33)
MCH RBC QN AUTO: 30 PG (ref 26.6–33)
MCH RBC QN AUTO: 30.2 PG (ref 26.6–33)
MCH RBC QN AUTO: 30.3 PG (ref 26.6–33)
MCH RBC QN AUTO: 30.3 PG (ref 26.6–33)
MCH RBC QN AUTO: 30.4 PG (ref 26.6–33)
MCH RBC QN AUTO: 30.6 PG (ref 26.6–33)
MCHC RBC AUTO-ENTMCNC: 29.3 G/DL (ref 31.5–35.7)
MCHC RBC AUTO-ENTMCNC: 29.3 G/DL (ref 31.5–35.7)
MCHC RBC AUTO-ENTMCNC: 29.4 G/DL (ref 31.5–35.7)
MCHC RBC AUTO-ENTMCNC: 29.6 G/DL (ref 31.5–35.7)
MCHC RBC AUTO-ENTMCNC: 29.6 G/DL (ref 31.5–35.7)
MCHC RBC AUTO-ENTMCNC: 29.7 G/DL (ref 31.5–35.7)
MCHC RBC AUTO-ENTMCNC: 29.7 G/DL (ref 31.5–35.7)
MCHC RBC AUTO-ENTMCNC: 29.8 G/DL (ref 31.5–35.7)
MCHC RBC AUTO-ENTMCNC: 30 G/DL (ref 31.5–35.7)
MCHC RBC AUTO-ENTMCNC: 30.1 G/DL (ref 31.5–35.7)
MCHC RBC AUTO-ENTMCNC: 30.3 G/DL (ref 31.5–35.7)
MCHC RBC AUTO-ENTMCNC: 30.4 G/DL (ref 31.5–35.7)
MCHC RBC AUTO-ENTMCNC: 30.4 G/DL (ref 31.5–35.7)
MCHC RBC AUTO-ENTMCNC: 30.5 G/DL (ref 31.5–35.7)
MCHC RBC AUTO-ENTMCNC: 30.5 G/DL (ref 31.5–35.7)
MCHC RBC AUTO-ENTMCNC: 30.6 G/DL (ref 31.5–35.7)
MCHC RBC AUTO-ENTMCNC: 30.8 G/DL (ref 31.5–35.7)
MCHC RBC AUTO-ENTMCNC: 30.8 G/DL (ref 31.5–35.7)
MCHC RBC AUTO-ENTMCNC: 30.9 G/DL (ref 31.5–35.7)
MCHC RBC AUTO-ENTMCNC: 31.6 G/DL (ref 31.5–35.7)
MCHC RBC AUTO-ENTMCNC: 31.7 G/DL (ref 31.5–35.7)
MCHC RBC AUTO-ENTMCNC: 32.4 G/DL (ref 31.5–35.7)
MCV RBC AUTO: 100 FL (ref 79–97)
MCV RBC AUTO: 100.9 FL (ref 79–97)
MCV RBC AUTO: 103.1 FL (ref 79–97)
MCV RBC AUTO: 92.3 FL (ref 79–97)
MCV RBC AUTO: 93.2 FL (ref 79–97)
MCV RBC AUTO: 94.1 FL (ref 79–97)
MCV RBC AUTO: 94.1 FL (ref 79–97)
MCV RBC AUTO: 94.6 FL (ref 79–97)
MCV RBC AUTO: 95.1 FL (ref 79–97)
MCV RBC AUTO: 95.3 FL (ref 79–97)
MCV RBC AUTO: 95.4 FL (ref 79–97)
MCV RBC AUTO: 95.5 FL (ref 79–97)
MCV RBC AUTO: 95.9 FL (ref 79–97)
MCV RBC AUTO: 96.1 FL (ref 79–97)
MCV RBC AUTO: 96.2 FL (ref 79–97)
MCV RBC AUTO: 96.3 FL (ref 79–97)
MCV RBC AUTO: 96.6 FL (ref 79–97)
MCV RBC AUTO: 96.7 FL (ref 79–97)
MCV RBC AUTO: 96.9 FL (ref 79–97)
MCV RBC AUTO: 96.9 FL (ref 79–97)
MCV RBC AUTO: 97.3 FL (ref 79–97)
MCV RBC AUTO: 97.7 FL (ref 79–97)
MCV RBC AUTO: 97.9 FL (ref 79–97)
MCV RBC AUTO: 98.1 FL (ref 79–97)
MCV RBC AUTO: 99.4 FL (ref 79–97)
MCV RBC AUTO: 99.5 FL (ref 79–97)
METAMYELOCYTES NFR BLD MANUAL: 1 % (ref 0–0)
METHGB BLD QL: -0.3 % (ref 0–1.5)
METHGB BLD QL: 0.6 % (ref 0–1.5)
METHGB BLD QL: 0.6 % (ref 0–1.5)
METHGB BLD QL: 0.9 % (ref 0–1.5)
METHGB BLD QL: 1 % (ref 0–1.5)
METHGB BLD QL: 1.2 % (ref 0–1.5)
MODALITY: ABNORMAL
MONOCYTES # BLD AUTO: 0.2 10*3/MM3 (ref 0.1–0.9)
MONOCYTES # BLD AUTO: 0.37 10*3/MM3 (ref 0.1–0.9)
MONOCYTES # BLD AUTO: 0.48 10*3/MM3 (ref 0.1–0.9)
MONOCYTES # BLD AUTO: 0.5 10*3/MM3 (ref 0.1–0.9)
MONOCYTES # BLD AUTO: 0.53 10*3/MM3 (ref 0.1–0.9)
MONOCYTES # BLD AUTO: 0.61 10*3/MM3 (ref 0.1–0.9)
MONOCYTES # BLD AUTO: 0.65 10*3/MM3 (ref 0.1–0.9)
MONOCYTES # BLD AUTO: 0.74 10*3/MM3 (ref 0.1–0.9)
MONOCYTES # BLD AUTO: 0.8 10*3/MM3 (ref 0.1–0.9)
MONOCYTES # BLD AUTO: 0.87 10*3/MM3 (ref 0.1–0.9)
MONOCYTES # BLD AUTO: 0.87 10*3/MM3 (ref 0.1–0.9)
MONOCYTES # BLD AUTO: 0.89 10*3/MM3 (ref 0.1–0.9)
MONOCYTES # BLD AUTO: 0.94 10*3/MM3 (ref 0.1–0.9)
MONOCYTES # BLD AUTO: 1.01 10*3/MM3 (ref 0.1–0.9)
MONOCYTES # BLD AUTO: 1.05 10*3/MM3 (ref 0.1–0.9)
MONOCYTES # BLD AUTO: 1.05 10*3/MM3 (ref 0.1–0.9)
MONOCYTES # BLD AUTO: 1.17 10*3/MM3 (ref 0.1–0.9)
MONOCYTES # BLD AUTO: 1.26 10*3/MM3 (ref 0.1–0.9)
MONOCYTES # BLD AUTO: 1.3 10*3/MM3 (ref 0.1–0.9)
MONOCYTES # BLD AUTO: 1.47 10*3/MM3 (ref 0.1–0.9)
MONOCYTES # BLD AUTO: 1.52 10*3/MM3 (ref 0.1–0.9)
MONOCYTES # BLD AUTO: 2.53 10*3/MM3 (ref 0.1–0.9)
MONOCYTES NFR BLD AUTO: 10.2 % (ref 5–12)
MONOCYTES NFR BLD AUTO: 10.3 % (ref 5–12)
MONOCYTES NFR BLD AUTO: 10.4 % (ref 5–12)
MONOCYTES NFR BLD AUTO: 10.7 % (ref 5–12)
MONOCYTES NFR BLD AUTO: 10.9 % (ref 5–12)
MONOCYTES NFR BLD AUTO: 10.9 % (ref 5–12)
MONOCYTES NFR BLD AUTO: 11.1 % (ref 5–12)
MONOCYTES NFR BLD AUTO: 11.2 % (ref 5–12)
MONOCYTES NFR BLD AUTO: 11.4 % (ref 5–12)
MONOCYTES NFR BLD AUTO: 12.3 % (ref 5–12)
MONOCYTES NFR BLD AUTO: 3 % (ref 5–12)
MONOCYTES NFR BLD AUTO: 4.4 % (ref 5–12)
MONOCYTES NFR BLD AUTO: 4.6 % (ref 5–12)
MONOCYTES NFR BLD AUTO: 5.3 % (ref 5–12)
MONOCYTES NFR BLD AUTO: 6.5 % (ref 5–12)
MONOCYTES NFR BLD AUTO: 6.6 % (ref 5–12)
MONOCYTES NFR BLD AUTO: 7.4 % (ref 5–12)
MONOCYTES NFR BLD AUTO: 7.9 % (ref 5–12)
MONOCYTES NFR BLD AUTO: 9 % (ref 5–12)
MONOCYTES NFR BLD AUTO: 9.1 % (ref 5–12)
MONOCYTES NFR BLD AUTO: 9.7 % (ref 5–12)
MRSA DNA SPEC QL NAA+PROBE: ABNORMAL
MUCOUS THREADS URNS QL MICRO: ABNORMAL /HPF
NEUTROPHILS # BLD AUTO: 17.8 10*3/MM3 (ref 1.7–7)
NEUTROPHILS # BLD AUTO: 6.96 10*3/MM3 (ref 1.7–7)
NEUTROPHILS # BLD AUTO: 8.21 10*3/MM3 (ref 1.7–7)
NEUTROPHILS NFR BLD AUTO: 10.12 10*3/MM3 (ref 1.7–7)
NEUTROPHILS NFR BLD AUTO: 10.7 10*3/MM3 (ref 1.7–7)
NEUTROPHILS NFR BLD AUTO: 11.93 10*3/MM3 (ref 1.7–7)
NEUTROPHILS NFR BLD AUTO: 19.08 10*3/MM3 (ref 1.7–7)
NEUTROPHILS NFR BLD AUTO: 4.86 10*3/MM3 (ref 1.7–7)
NEUTROPHILS NFR BLD AUTO: 4.92 10*3/MM3 (ref 1.7–7)
NEUTROPHILS NFR BLD AUTO: 6.11 10*3/MM3 (ref 1.7–7)
NEUTROPHILS NFR BLD AUTO: 6.28 10*3/MM3 (ref 1.7–7)
NEUTROPHILS NFR BLD AUTO: 6.88 10*3/MM3 (ref 1.7–7)
NEUTROPHILS NFR BLD AUTO: 6.96 10*3/MM3 (ref 1.7–7)
NEUTROPHILS NFR BLD AUTO: 66 % (ref 42.7–76)
NEUTROPHILS NFR BLD AUTO: 7.21 10*3/MM3 (ref 1.7–7)
NEUTROPHILS NFR BLD AUTO: 74.9 % (ref 42.7–76)
NEUTROPHILS NFR BLD AUTO: 78.1 % (ref 42.7–76)
NEUTROPHILS NFR BLD AUTO: 78.6 % (ref 42.7–76)
NEUTROPHILS NFR BLD AUTO: 8.05 10*3/MM3 (ref 1.7–7)
NEUTROPHILS NFR BLD AUTO: 8.33 10*3/MM3 (ref 1.7–7)
NEUTROPHILS NFR BLD AUTO: 8.4 10*3/MM3 (ref 1.7–7)
NEUTROPHILS NFR BLD AUTO: 8.7 10*3/MM3 (ref 1.7–7)
NEUTROPHILS NFR BLD AUTO: 80.2 % (ref 42.7–76)
NEUTROPHILS NFR BLD AUTO: 81.1 % (ref 42.7–76)
NEUTROPHILS NFR BLD AUTO: 81.4 % (ref 42.7–76)
NEUTROPHILS NFR BLD AUTO: 81.5 % (ref 42.7–76)
NEUTROPHILS NFR BLD AUTO: 81.5 % (ref 42.7–76)
NEUTROPHILS NFR BLD AUTO: 83.5 % (ref 42.7–76)
NEUTROPHILS NFR BLD AUTO: 84.2 % (ref 42.7–76)
NEUTROPHILS NFR BLD AUTO: 84.9 % (ref 42.7–76)
NEUTROPHILS NFR BLD AUTO: 85 % (ref 42.7–76)
NEUTROPHILS NFR BLD AUTO: 85.1 % (ref 42.7–76)
NEUTROPHILS NFR BLD AUTO: 85.1 % (ref 42.7–76)
NEUTROPHILS NFR BLD AUTO: 86.8 % (ref 42.7–76)
NEUTROPHILS NFR BLD AUTO: 87.2 % (ref 42.7–76)
NEUTROPHILS NFR BLD AUTO: 88 % (ref 42.7–76)
NEUTROPHILS NFR BLD AUTO: 89.5 % (ref 42.7–76)
NEUTROPHILS NFR BLD AUTO: 9.17 10*3/MM3 (ref 1.7–7)
NEUTROPHILS NFR BLD AUTO: 9.51 10*3/MM3 (ref 1.7–7)
NEUTROPHILS NFR BLD AUTO: 9.77 10*3/MM3 (ref 1.7–7)
NEUTROPHILS NFR BLD AUTO: 9.95 10*3/MM3 (ref 1.7–7)
NEUTROPHILS NFR BLD AUTO: 91.5 % (ref 42.7–76)
NEUTROPHILS NFR BLD AUTO: 92.1 % (ref 42.7–76)
NEUTROPHILS NFR BLD MANUAL: 85 % (ref 42.7–76)
NEUTS BAND NFR BLD MANUAL: 6 % (ref 0–5)
NITRITE UR QL STRIP: NEGATIVE
NOTE: ABNORMAL
NRBC BLD AUTO-RTO: 0 /100 WBC (ref 0–0.2)
NT-PROBNP SERPL-MCNC: 2693 PG/ML (ref 0–900)
NT-PROBNP SERPL-MCNC: 8311 PG/ML (ref 0–900)
NT-PROBNP SERPL-MCNC: ABNORMAL PG/ML (ref 0–900)
OXYHGB MFR BLDV: 92.1 % (ref 94–99)
OXYHGB MFR BLDV: 92.4 % (ref 94–99)
OXYHGB MFR BLDV: 94.8 % (ref 94–99)
OXYHGB MFR BLDV: 95.4 % (ref 94–99)
OXYHGB MFR BLDV: 96.4 % (ref 94–99)
OXYHGB MFR BLDV: 97.1 % (ref 94–99)
PCO2 BLDA: 59 MM HG (ref 35–45)
PCO2 BLDA: 63.7 MM HG (ref 35–45)
PCO2 BLDA: 67.7 MM HG (ref 35–45)
PCO2 BLDA: 68.6 MM HG (ref 35–45)
PCO2 BLDA: 71 MM HG (ref 35–45)
PCO2 BLDA: 71.1 MM HG (ref 35–45)
PCO2 TEMP ADJ BLD: ABNORMAL MM[HG]
PH BLDA: 7.32 PH UNITS (ref 7.35–7.45)
PH BLDA: 7.33 PH UNITS (ref 7.35–7.45)
PH BLDA: 7.34 PH UNITS (ref 7.35–7.45)
PH BLDA: 7.36 PH UNITS (ref 7.35–7.45)
PH BLDA: 7.37 PH UNITS (ref 7.35–7.45)
PH BLDA: 7.45 PH UNITS (ref 7.35–7.45)
PH UR STRIP.AUTO: 6.5 [PH] (ref 5–8)
PH UR STRIP.AUTO: 6.5 [PH] (ref 5–8)
PH UR STRIP.AUTO: <=5 [PH] (ref 5–8)
PH UR STRIP.AUTO: <=5 [PH] (ref 5–8)
PH UR STRIP: 6 [PH] (ref 5–8)
PH, TEMP CORRECTED: ABNORMAL
PHOSPHATE SERPL-MCNC: 2.6 MG/DL (ref 2.5–4.5)
PHOSPHATE SERPL-MCNC: 2.8 MG/DL (ref 2.5–4.5)
PHOSPHATE SERPL-MCNC: 3.6 MG/DL (ref 2.5–4.5)
PHOSPHATE SERPL-MCNC: 4.1 MG/DL (ref 2.5–4.5)
PHOSPHATE SERPL-MCNC: 4.7 MG/DL (ref 2.5–4.5)
PHOSPHATE SERPL-MCNC: 5.1 MG/DL (ref 2.5–4.5)
PHOSPHATE SERPL-MCNC: 5.8 MG/DL (ref 2.5–4.5)
PHOSPHATE SERPL-MCNC: 6.2 MG/DL (ref 2.5–4.5)
PLAT MORPH BLD: NORMAL
PLATELET # BLD AUTO: 151 10*3/MM3 (ref 140–450)
PLATELET # BLD AUTO: 154 10*3/MM3 (ref 140–450)
PLATELET # BLD AUTO: 167 10*3/MM3 (ref 140–450)
PLATELET # BLD AUTO: 173 10*3/MM3 (ref 140–450)
PLATELET # BLD AUTO: 175 10*3/MM3 (ref 140–450)
PLATELET # BLD AUTO: 180 10*3/MM3 (ref 140–450)
PLATELET # BLD AUTO: 186 10*3/MM3 (ref 140–450)
PLATELET # BLD AUTO: 188 10*3/MM3 (ref 140–450)
PLATELET # BLD AUTO: 195 10*3/MM3 (ref 140–450)
PLATELET # BLD AUTO: 198 10*3/MM3 (ref 140–450)
PLATELET # BLD AUTO: 202 10*3/MM3 (ref 140–450)
PLATELET # BLD AUTO: 205 10*3/MM3 (ref 140–450)
PLATELET # BLD AUTO: 206 10*3/MM3 (ref 140–450)
PLATELET # BLD AUTO: 210 10*3/MM3 (ref 140–450)
PLATELET # BLD AUTO: 213 10*3/MM3 (ref 140–450)
PLATELET # BLD AUTO: 216 10*3/MM3 (ref 140–450)
PLATELET # BLD AUTO: 218 10*3/MM3 (ref 140–450)
PLATELET # BLD AUTO: 219 10*3/MM3 (ref 140–450)
PLATELET # BLD AUTO: 231 10*3/MM3 (ref 140–450)
PLATELET # BLD AUTO: 234 10*3/MM3 (ref 140–450)
PLATELET # BLD AUTO: 246 10*3/MM3 (ref 140–450)
PLATELET # BLD AUTO: 249 10*3/MM3 (ref 140–450)
PLATELET # BLD AUTO: 253 10*3/MM3 (ref 140–450)
PLATELET # BLD AUTO: 254 10*3/MM3 (ref 140–450)
PLATELET # BLD AUTO: 259 10*3/MM3 (ref 140–450)
PLATELET # BLD AUTO: ABNORMAL 10*3/UL
PMV BLD AUTO: 10.1 FL (ref 6–12)
PMV BLD AUTO: 8.3 FL (ref 6–12)
PMV BLD AUTO: 8.5 FL (ref 6–12)
PMV BLD AUTO: 8.7 FL (ref 6–12)
PMV BLD AUTO: 8.8 FL (ref 6–12)
PMV BLD AUTO: 8.9 FL (ref 6–12)
PMV BLD AUTO: 9 FL (ref 6–12)
PMV BLD AUTO: 9.1 FL (ref 6–12)
PMV BLD AUTO: 9.2 FL (ref 6–12)
PMV BLD AUTO: 9.3 FL (ref 6–12)
PMV BLD AUTO: 9.3 FL (ref 6–12)
PMV BLD AUTO: 9.4 FL (ref 6–12)
PMV BLD AUTO: 9.6 FL (ref 6–12)
PMV BLD AUTO: 9.7 FL (ref 6–12)
PO2 BLDA: 178 MM HG (ref 75–100)
PO2 BLDA: 67.7 MM HG (ref 75–100)
PO2 BLDA: 70.8 MM HG (ref 75–100)
PO2 BLDA: 85 MM HG (ref 75–100)
PO2 BLDA: 86.9 MM HG (ref 75–100)
PO2 BLDA: 95.3 MM HG (ref 75–100)
PO2 TEMP ADJ BLD: ABNORMAL MM[HG]
POIKILOCYTOSIS BLD QL SMEAR: NORMAL
POIKILOCYTOSIS BLD QL SMEAR: NORMAL
POLYCHROMASIA BLD QL SMEAR: NORMAL
POTASSIUM SERPL-SCNC: 3.6 MMOL/L (ref 3.5–5.2)
POTASSIUM SERPL-SCNC: 3.8 MMOL/L (ref 3.5–5.2)
POTASSIUM SERPL-SCNC: 3.8 MMOL/L (ref 3.5–5.2)
POTASSIUM SERPL-SCNC: 4 MMOL/L (ref 3.5–5.2)
POTASSIUM SERPL-SCNC: 4.1 MMOL/L (ref 3.5–5.2)
POTASSIUM SERPL-SCNC: 4.2 MMOL/L (ref 3.5–5.2)
POTASSIUM SERPL-SCNC: 4.3 MMOL/L (ref 3.5–5.2)
POTASSIUM SERPL-SCNC: 4.5 MMOL/L (ref 3.5–5.2)
POTASSIUM SERPL-SCNC: 4.6 MMOL/L (ref 3.5–5.2)
POTASSIUM SERPL-SCNC: 4.8 MMOL/L (ref 3.5–5.2)
POTASSIUM SERPL-SCNC: 4.9 MMOL/L (ref 3.5–5.2)
POTASSIUM SERPL-SCNC: 5 MMOL/L (ref 3.5–5.2)
POTASSIUM SERPL-SCNC: 5 MMOL/L (ref 3.5–5.2)
POTASSIUM SERPL-SCNC: 5.1 MMOL/L (ref 3.5–5.2)
POTASSIUM SERPL-SCNC: 5.1 MMOL/L (ref 3.5–5.2)
POTASSIUM SERPL-SCNC: 5.2 MMOL/L (ref 3.5–5.2)
PROCALCITONIN SERPL-MCNC: 0.08 NG/ML (ref 0–0.25)
PROCALCITONIN SERPL-MCNC: 0.32 NG/ML (ref 0–0.25)
PROCALCITONIN SERPL-MCNC: 7.24 NG/ML (ref 0–0.25)
PROT 24H UR-MRATE: 1782.9 MG/24HOURS (ref 0–150)
PROT SERPL-MCNC: 5.5 G/DL (ref 6–8.5)
PROT SERPL-MCNC: 5.8 G/DL (ref 6–8.5)
PROT SERPL-MCNC: 5.8 G/DL (ref 6–8.5)
PROT SERPL-MCNC: 6.1 G/DL (ref 6–8.5)
PROT SERPL-MCNC: 6.1 G/DL (ref 6–8.5)
PROT SERPL-MCNC: 6.2 G/DL (ref 6–8.5)
PROT SERPL-MCNC: 6.2 G/DL (ref 6–8.5)
PROT SERPL-MCNC: 6.4 G/DL (ref 6–8.5)
PROT SERPL-MCNC: 6.5 G/DL (ref 6–8.5)
PROT SERPL-MCNC: 6.5 G/DL (ref 6–8.5)
PROT SERPL-MCNC: 6.6 G/DL (ref 6–8.5)
PROT SERPL-MCNC: 6.7 G/DL (ref 6–8.5)
PROT SERPL-MCNC: 6.8 G/DL (ref 6–8.5)
PROT SERPL-MCNC: 6.8 G/DL (ref 6–8.5)
PROT SERPL-MCNC: 6.9 G/DL (ref 6–8.5)
PROT SERPL-MCNC: 7 G/DL (ref 6–8.5)
PROT UR QL STRIP: ABNORMAL
PROT UR QL STRIP: NEGATIVE
PROT UR QL STRIP: NEGATIVE
PROT UR-MCNC: 140 MG/DL
PROT/CREAT UR: 930.2 MG/G CREA (ref 0–200)
PROTHROMBIN TIME: 12.9 SECONDS (ref 11.5–14)
PROTHROMBIN TIME: 15.7 SECONDS (ref 12–15.1)
PSA SERPL-MCNC: 0.42 NG/ML (ref 0–4)
QT INTERVAL: 490 MS
QTC INTERVAL: 532 MS
RBC # BLD AUTO: 2.56 10*6/MM3 (ref 4.14–5.8)
RBC # BLD AUTO: 2.64 10*6/MM3 (ref 4.14–5.8)
RBC # BLD AUTO: 2.65 10*6/MM3 (ref 4.14–5.8)
RBC # BLD AUTO: 2.7 10*6/MM3 (ref 4.14–5.8)
RBC # BLD AUTO: 2.72 10*6/MM3 (ref 4.14–5.8)
RBC # BLD AUTO: 2.73 10*6/MM3 (ref 4.14–5.8)
RBC # BLD AUTO: 2.76 10*6/MM3 (ref 4.14–5.8)
RBC # BLD AUTO: 2.86 10*6/MM3 (ref 4.14–5.8)
RBC # BLD AUTO: 3.04 10*6/MM3 (ref 4.14–5.8)
RBC # BLD AUTO: 3.14 10*6/MM3 (ref 4.14–5.8)
RBC # BLD AUTO: 3.19 10*6/MM3 (ref 4.14–5.8)
RBC # BLD AUTO: 3.19 10*6/MM3 (ref 4.14–5.8)
RBC # BLD AUTO: 3.25 10*6/MM3 (ref 4.14–5.8)
RBC # BLD AUTO: 3.29 10*6/MM3 (ref 4.14–5.8)
RBC # BLD AUTO: 3.48 10*6/MM3 (ref 4.14–5.8)
RBC # BLD AUTO: 3.59 10*6/MM3 (ref 4.14–5.8)
RBC # BLD AUTO: 3.6 10*6/MM3 (ref 4.14–5.8)
RBC # BLD AUTO: 3.68 10*6/MM3 (ref 4.14–5.8)
RBC # BLD AUTO: 3.7 10*6/MM3 (ref 4.14–5.8)
RBC # BLD AUTO: 3.78 10*6/MM3 (ref 4.14–5.8)
RBC # BLD AUTO: 3.84 10*6/MM3 (ref 4.14–5.8)
RBC # BLD AUTO: 3.88 10*6/MM3 (ref 4.14–5.8)
RBC # BLD AUTO: 3.92 10*6/MM3 (ref 4.14–5.8)
RBC # BLD AUTO: 3.96 10*6/MM3 (ref 4.14–5.8)
RBC # BLD AUTO: 4.06 10*6/MM3 (ref 4.14–5.8)
RBC # BLD AUTO: 4.25 10*6/MM3 (ref 4.14–5.8)
RBC # UR: ABNORMAL /HPF
RBC MORPH BLD: NORMAL
RBC MORPH BLD: NORMAL
REF LAB TEST METHOD: ABNORMAL
RETICS/RBC NFR AUTO: 2.6 % (ref 0.6–2.6)
RH BLD: POSITIVE
RHINOVIRUS RNA SPEC NAA+PROBE: NOT DETECTED
RSV RNA NPH QL NAA+NON-PROBE: NOT DETECTED
SAO2 % BLDCOA: 93.1 % (ref 94–100)
SAO2 % BLDCOA: 93.9 % (ref 94–100)
SAO2 % BLDCOA: 97 % (ref 94–100)
SAO2 % BLDCOA: 97.1 % (ref 94–100)
SAO2 % BLDCOA: 98 % (ref 94–100)
SAO2 % BLDCOA: 99.9 % (ref 94–100)
SARS-COV-2 RNA NOSE QL NAA+PROBE: NOT DETECTED
SARS-COV-2 RNA NOSE QL NAA+PROBE: NOT DETECTED
SARS-COV-2 RNA PNL SPEC NAA+PROBE: NOT DETECTED
SARS-COV-2 RNA RESP QL NAA+PROBE: NOT DETECTED
SCAN SLIDE: NORMAL
SMALL PLATELETS BLD QL SMEAR: ADEQUATE
SODIUM SERPL-SCNC: 135 MMOL/L (ref 136–145)
SODIUM SERPL-SCNC: 135 MMOL/L (ref 136–145)
SODIUM SERPL-SCNC: 136 MMOL/L (ref 136–145)
SODIUM SERPL-SCNC: 137 MMOL/L (ref 136–145)
SODIUM SERPL-SCNC: 139 MMOL/L (ref 136–145)
SODIUM SERPL-SCNC: 140 MMOL/L (ref 136–145)
SODIUM SERPL-SCNC: 141 MMOL/L (ref 136–145)
SODIUM SERPL-SCNC: 141 MMOL/L (ref 136–145)
SODIUM SERPL-SCNC: 142 MMOL/L (ref 136–145)
SODIUM SERPL-SCNC: 143 MMOL/L (ref 136–145)
SODIUM SERPL-SCNC: 145 MMOL/L (ref 136–145)
SODIUM SERPL-SCNC: 145 MMOL/L (ref 136–145)
SODIUM SERPL-SCNC: 147 MMOL/L (ref 136–145)
SODIUM UR-SCNC: 59 MMOL/L
SODIUM UR-SCNC: <20 MMOL/L
SODIUM UR-SCNC: <20 MMOL/L
SP GR UR STRIP: 1.01 (ref 1–1.03)
SP GR UR STRIP: 1.01 (ref 1–1.03)
SP GR UR STRIP: 1.02 (ref 1–1.03)
SP GR UR STRIP: 1.02 (ref 1–1.03)
SP GR UR: 1.01 (ref 1–1.03)
SPECIMEN VOL 24H UR: 2830 ML
SPECIMEN VOL 24H UR: 2830 ML
SQUAMOUS #/AREA URNS HPF: ABNORMAL /HPF
T&S EXPIRATION DATE: NORMAL
TIBC SERPL-MCNC: 253 MCG/DL (ref 298–536)
TIBC SERPL-MCNC: 291 UG/DL (ref 250–450)
TRANSFERRIN SERPL-MCNC: 170 MG/DL (ref 200–360)
TRIGL SERPL-MCNC: 141 MG/DL (ref 0–150)
TROPONIN T SERPL-MCNC: 0.02 NG/ML (ref 0–0.03)
TROPONIN T SERPL-MCNC: 0.03 NG/ML (ref 0–0.03)
TROPONIN T SERPL-MCNC: 0.04 NG/ML (ref 0–0.03)
TROPONIN T SERPL-MCNC: 0.04 NG/ML (ref 0–0.03)
TROPONIN T SERPL-MCNC: 0.05 NG/ML (ref 0–0.03)
TROPONIN T SERPL-MCNC: 0.07 NG/ML (ref 0–0.03)
TROPONIN T SERPL-MCNC: 0.07 NG/ML (ref 0–0.03)
TROPONIN T SERPL-MCNC: 0.08 NG/ML (ref 0–0.03)
TSH SERPL DL<=0.005 MIU/L-ACNC: 1.1 UIU/ML (ref 0.27–4.2)
TSH SERPL DL<=0.005 MIU/L-ACNC: 1.28 UIU/ML (ref 0.27–4.2)
UIBC SERPL-MCNC: 251 UG/DL (ref 111–343)
UNABLE TO VOID: NORMAL
UNIT  ABO: NORMAL
UNIT  RH: NORMAL
URATE SERPL-MCNC: 10.8 MG/DL (ref 3.4–7)
URATE SERPL-MCNC: 11.4 MG/DL (ref 3.4–7)
URATE SERPL-MCNC: 17.4 MG/DL (ref 3.4–7)
UROBILINOGEN UR QL STRIP: ABNORMAL
UROBILINOGEN UR STRIP-MCNC: NORMAL MG/DL
VANCOMYCIN SERPL-MCNC: 16 MCG/ML (ref 5–40)
VANCOMYCIN SERPL-MCNC: 16 MCG/ML (ref 5–40)
VANCOMYCIN SERPL-MCNC: 17.7 MCG/ML (ref 5–40)
VANCOMYCIN SERPL-MCNC: 17.8 MCG/ML (ref 5–40)
VANCOMYCIN SERPL-MCNC: 17.9 MCG/ML (ref 5–40)
VANCOMYCIN SERPL-MCNC: 20.2 MCG/ML (ref 5–40)
VANCOMYCIN SERPL-MCNC: 21.9 MCG/ML (ref 5–40)
VANCOMYCIN SERPL-MCNC: 22.9 MCG/ML (ref 5–40)
VANCOMYCIN TROUGH SERPL-MCNC: 21.5 MCG/ML (ref 5–20)
VENTILATOR MODE: ABNORMAL
VIT B12 SERPL-MCNC: 660 PG/ML (ref 232–1245)
VLDLC SERPL CALC-MCNC: 25 MG/DL (ref 5–40)
WBC # BLD AUTO: 10.02 10*3/MM3 (ref 3.4–10.8)
WBC # BLD AUTO: 10.35 10*3/MM3 (ref 3.4–10.8)
WBC # BLD AUTO: 10.99 10*3/MM3 (ref 3.4–10.8)
WBC # BLD AUTO: 11.66 10*3/MM3 (ref 3.4–10.8)
WBC # BLD AUTO: 11.69 10*3/MM3 (ref 3.4–10.8)
WBC # BLD AUTO: 11.76 10*3/MM3 (ref 3.4–10.8)
WBC # BLD AUTO: 12 10*3/MM3 (ref 3.4–10.8)
WBC # BLD AUTO: 13.32 10*3/MM3 (ref 3.4–10.8)
WBC # BLD AUTO: 13.33 10*3/MM3 (ref 3.4–10.8)
WBC # BLD AUTO: 14.8 10*3/MM3 (ref 3.4–10.8)
WBC # BLD AUTO: 17.71 10*3/MM3 (ref 3.4–10.8)
WBC # BLD AUTO: 19.56 10*3/MM3 (ref 3.4–10.8)
WBC # BLD AUTO: 19.9 10*3/MM3 (ref 3.4–10.8)
WBC # BLD AUTO: 22.5 10*3/MM3 (ref 3.4–10.8)
WBC # BLD AUTO: 5.59 10*3/MM3 (ref 3.4–10.8)
WBC # BLD AUTO: 6.68 10*3/MM3 (ref 3.4–10.8)
WBC # BLD AUTO: 7.36 10*3/MM3 (ref 3.4–10.8)
WBC # BLD AUTO: 8.17 10*3/MM3 (ref 3.4–10.8)
WBC # BLD AUTO: 8.19 10*3/MM3 (ref 3.4–10.8)
WBC # BLD AUTO: 8.21 10*3/MM3 (ref 3.4–10.8)
WBC # BLD AUTO: 8.39 10*3/MM3 (ref 3.4–10.8)
WBC # BLD AUTO: 8.44 10*3/MM3 (ref 3.4–10.8)
WBC # BLD AUTO: 9.17 10*3/MM3 (ref 3.4–10.8)
WBC # BLD AUTO: 9.42 10*3/MM3 (ref 3.4–10.8)
WBC # BLD AUTO: 9.65 10*3/MM3 (ref 3.4–10.8)
WBC # BLD AUTO: 9.9 10*3/MM3 (ref 3.4–10.8)
WBC CLUMPS # UR AUTO: ABNORMAL /HPF
WBC MORPH BLD: NORMAL
WBC UR QL AUTO: ABNORMAL /HPF
WHOLE BLOOD HOLD SPECIMEN: NORMAL

## 2021-01-01 PROCEDURE — 83050 HGB METHEMOGLOBIN QUAN: CPT

## 2021-01-01 PROCEDURE — 87635 SARS-COV-2 COVID-19 AMP PRB: CPT | Performed by: INTERNAL MEDICINE

## 2021-01-01 PROCEDURE — 25010000002 FUROSEMIDE PER 20 MG: Performed by: INTERNAL MEDICINE

## 2021-01-01 PROCEDURE — 25010000002 VANCOMYCIN 5 G RECONSTITUTED SOLUTION 5,000 MG VIAL: Performed by: PHYSICIAN ASSISTANT

## 2021-01-01 PROCEDURE — 91010 ESOPHAGUS MOTILITY STUDY: CPT | Performed by: SURGERY

## 2021-01-01 PROCEDURE — 25010000002 METHYLPREDNISOLONE PER 40 MG: Performed by: NURSE PRACTITIONER

## 2021-01-01 PROCEDURE — 93005 ELECTROCARDIOGRAM TRACING: CPT | Performed by: EMERGENCY MEDICINE

## 2021-01-01 PROCEDURE — 99214 OFFICE O/P EST MOD 30 MIN: CPT | Performed by: INTERNAL MEDICINE

## 2021-01-01 PROCEDURE — 85025 COMPLETE CBC W/AUTO DIFF WBC: CPT | Performed by: INTERNAL MEDICINE

## 2021-01-01 PROCEDURE — 85730 THROMBOPLASTIN TIME PARTIAL: CPT | Performed by: INTERNAL MEDICINE

## 2021-01-01 PROCEDURE — 87147 CULTURE TYPE IMMUNOLOGIC: CPT | Performed by: NURSE PRACTITIONER

## 2021-01-01 PROCEDURE — 94799 UNLISTED PULMONARY SVC/PX: CPT

## 2021-01-01 PROCEDURE — 87147 CULTURE TYPE IMMUNOLOGIC: CPT | Performed by: INTERNAL MEDICINE

## 2021-01-01 PROCEDURE — 97161 PT EVAL LOW COMPLEX 20 MIN: CPT

## 2021-01-01 PROCEDURE — 93005 ELECTROCARDIOGRAM TRACING: CPT | Performed by: PHYSICIAN ASSISTANT

## 2021-01-01 PROCEDURE — 80053 COMPREHEN METABOLIC PANEL: CPT | Performed by: NURSE PRACTITIONER

## 2021-01-01 PROCEDURE — 99223 1ST HOSP IP/OBS HIGH 75: CPT | Performed by: EMERGENCY MEDICINE

## 2021-01-01 PROCEDURE — 25010000002 FUROSEMIDE PER 20 MG: Performed by: PHYSICIAN ASSISTANT

## 2021-01-01 PROCEDURE — 25010000002 HEPARIN (PORCINE) PER 1000 UNITS: Performed by: INTERNAL MEDICINE

## 2021-01-01 PROCEDURE — 87040 BLOOD CULTURE FOR BACTERIA: CPT | Performed by: NURSE PRACTITIONER

## 2021-01-01 PROCEDURE — 87186 SC STD MICRODIL/AGAR DIL: CPT | Performed by: PHYSICIAN ASSISTANT

## 2021-01-01 PROCEDURE — 25010000002 LORAZEPAM PER 2 MG: Performed by: INTERNAL MEDICINE

## 2021-01-01 PROCEDURE — 99232 SBSQ HOSP IP/OBS MODERATE 35: CPT | Performed by: NURSE PRACTITIONER

## 2021-01-01 PROCEDURE — 94660 CPAP INITIATION&MGMT: CPT

## 2021-01-01 PROCEDURE — 93294 REM INTERROG EVL PM/LDLS PM: CPT | Performed by: INTERNAL MEDICINE

## 2021-01-01 PROCEDURE — 85027 COMPLETE CBC AUTOMATED: CPT | Performed by: INTERNAL MEDICINE

## 2021-01-01 PROCEDURE — 25010000002 ENOXAPARIN PER 10 MG: Performed by: NURSE PRACTITIONER

## 2021-01-01 PROCEDURE — 25010000002 VANCOMYCIN PER 500 MG: Performed by: NURSE PRACTITIONER

## 2021-01-01 PROCEDURE — 93010 ELECTROCARDIOGRAM REPORT: CPT | Performed by: INTERNAL MEDICINE

## 2021-01-01 PROCEDURE — 93306 TTE W/DOPPLER COMPLETE: CPT

## 2021-01-01 PROCEDURE — 77080 DXA BONE DENSITY AXIAL: CPT

## 2021-01-01 PROCEDURE — 25010000002 PIPERACILLIN SOD-TAZOBACTAM PER 1 G: Performed by: INTERNAL MEDICINE

## 2021-01-01 PROCEDURE — 82570 ASSAY OF URINE CREATININE: CPT | Performed by: INTERNAL MEDICINE

## 2021-01-01 PROCEDURE — 85025 COMPLETE CBC W/AUTO DIFF WBC: CPT | Performed by: EMERGENCY MEDICINE

## 2021-01-01 PROCEDURE — 84300 ASSAY OF URINE SODIUM: CPT | Performed by: INTERNAL MEDICINE

## 2021-01-01 PROCEDURE — G0378 HOSPITAL OBSERVATION PER HR: HCPCS

## 2021-01-01 PROCEDURE — 99233 SBSQ HOSP IP/OBS HIGH 50: CPT | Performed by: INTERNAL MEDICINE

## 2021-01-01 PROCEDURE — 99222 1ST HOSP IP/OBS MODERATE 55: CPT | Performed by: SURGERY

## 2021-01-01 PROCEDURE — 84550 ASSAY OF BLOOD/URIC ACID: CPT | Performed by: INTERNAL MEDICINE

## 2021-01-01 PROCEDURE — 86901 BLOOD TYPING SEROLOGIC RH(D): CPT | Performed by: INTERNAL MEDICINE

## 2021-01-01 PROCEDURE — 25010000002 ONDANSETRON PER 1 MG: Performed by: INTERNAL MEDICINE

## 2021-01-01 PROCEDURE — 86920 COMPATIBILITY TEST SPIN: CPT

## 2021-01-01 PROCEDURE — 80069 RENAL FUNCTION PANEL: CPT | Performed by: INTERNAL MEDICINE

## 2021-01-01 PROCEDURE — 82805 BLOOD GASES W/O2 SATURATION: CPT

## 2021-01-01 PROCEDURE — 63710000001 PREDNISONE PER 5 MG: Performed by: FAMILY MEDICINE

## 2021-01-01 PROCEDURE — G0439 PPPS, SUBSEQ VISIT: HCPCS | Performed by: INTERNAL MEDICINE

## 2021-01-01 PROCEDURE — 25010000002 FUROSEMIDE PER 20 MG: Performed by: NURSE PRACTITIONER

## 2021-01-01 PROCEDURE — 87635 SARS-COV-2 COVID-19 AMP PRB: CPT | Performed by: NURSE PRACTITIONER

## 2021-01-01 PROCEDURE — 25010000002 CEFTRIAXONE SODIUM-DEXTROSE 1-3.74 GM-%(50ML) RECONSTITUTED SOLUTION: Performed by: INTERNAL MEDICINE

## 2021-01-01 PROCEDURE — 99232 SBSQ HOSP IP/OBS MODERATE 35: CPT | Performed by: INTERNAL MEDICINE

## 2021-01-01 PROCEDURE — 85610 PROTHROMBIN TIME: CPT | Performed by: INTERNAL MEDICINE

## 2021-01-01 PROCEDURE — 96365 THER/PROPH/DIAG IV INF INIT: CPT

## 2021-01-01 PROCEDURE — 0 TECHNETIUM TC 99M MEBROFENIN KIT: Performed by: INTERNAL MEDICINE

## 2021-01-01 PROCEDURE — 25010000002 AZITHROMYCIN 500 MG/250 ML: Performed by: INTERNAL MEDICINE

## 2021-01-01 PROCEDURE — 83735 ASSAY OF MAGNESIUM: CPT | Performed by: INTERNAL MEDICINE

## 2021-01-01 PROCEDURE — 25010000002 AZITHROMYCIN 500 MG/250 ML: Performed by: NURSE PRACTITIONER

## 2021-01-01 PROCEDURE — 25010000002 ONDANSETRON PER 1 MG: Performed by: EMERGENCY MEDICINE

## 2021-01-01 PROCEDURE — 82375 ASSAY CARBOXYHB QUANT: CPT

## 2021-01-01 PROCEDURE — 81001 URINALYSIS AUTO W/SCOPE: CPT | Performed by: NURSE PRACTITIONER

## 2021-01-01 PROCEDURE — 74176 CT ABD & PELVIS W/O CONTRAST: CPT

## 2021-01-01 PROCEDURE — 74177 CT ABD & PELVIS W/CONTRAST: CPT

## 2021-01-01 PROCEDURE — 80053 COMPREHEN METABOLIC PANEL: CPT | Performed by: EMERGENCY MEDICINE

## 2021-01-01 PROCEDURE — 84484 ASSAY OF TROPONIN QUANT: CPT | Performed by: PHYSICIAN ASSISTANT

## 2021-01-01 PROCEDURE — 99284 EMERGENCY DEPT VISIT MOD MDM: CPT

## 2021-01-01 PROCEDURE — 25010000002 CEFTRIAXONE PER 250 MG: Performed by: PHYSICIAN ASSISTANT

## 2021-01-01 PROCEDURE — 25010000002 HYDROMORPHONE 1 MG/ML SOLUTION: Performed by: EMERGENCY MEDICINE

## 2021-01-01 PROCEDURE — 85730 THROMBOPLASTIN TIME PARTIAL: CPT | Performed by: EMERGENCY MEDICINE

## 2021-01-01 PROCEDURE — 87641 MR-STAPH DNA AMP PROBE: CPT | Performed by: EMERGENCY MEDICINE

## 2021-01-01 PROCEDURE — 97165 OT EVAL LOW COMPLEX 30 MIN: CPT

## 2021-01-01 PROCEDURE — 87635 SARS-COV-2 COVID-19 AMP PRB: CPT | Performed by: FAMILY MEDICINE

## 2021-01-01 PROCEDURE — 36600 WITHDRAWAL OF ARTERIAL BLOOD: CPT

## 2021-01-01 PROCEDURE — 82550 ASSAY OF CK (CPK): CPT | Performed by: NURSE PRACTITIONER

## 2021-01-01 PROCEDURE — 94640 AIRWAY INHALATION TREATMENT: CPT

## 2021-01-01 PROCEDURE — 85025 COMPLETE CBC W/AUTO DIFF WBC: CPT | Performed by: NURSE PRACTITIONER

## 2021-01-01 PROCEDURE — 82575 CREATININE CLEARANCE TEST: CPT | Performed by: INTERNAL MEDICINE

## 2021-01-01 PROCEDURE — 25010000002 IOPAMIDOL 61 % SOLUTION: Performed by: EMERGENCY MEDICINE

## 2021-01-01 PROCEDURE — 99204 OFFICE O/P NEW MOD 45 MIN: CPT | Performed by: INTERNAL MEDICINE

## 2021-01-01 PROCEDURE — 87186 SC STD MICRODIL/AGAR DIL: CPT | Performed by: INTERNAL MEDICINE

## 2021-01-01 PROCEDURE — 25010000002 ENOXAPARIN PER 10 MG: Performed by: EMERGENCY MEDICINE

## 2021-01-01 PROCEDURE — U0004 COV-19 TEST NON-CDC HGH THRU: HCPCS

## 2021-01-01 PROCEDURE — 87040 BLOOD CULTURE FOR BACTERIA: CPT | Performed by: PHYSICIAN ASSISTANT

## 2021-01-01 PROCEDURE — 85007 BL SMEAR W/DIFF WBC COUNT: CPT | Performed by: EMERGENCY MEDICINE

## 2021-01-01 PROCEDURE — 25010000002 FUROSEMIDE PER 20 MG: Performed by: FAMILY MEDICINE

## 2021-01-01 PROCEDURE — 96375 TX/PRO/DX INJ NEW DRUG ADDON: CPT

## 2021-01-01 PROCEDURE — 99497 ADVNCD CARE PLAN 30 MIN: CPT | Performed by: INTERNAL MEDICINE

## 2021-01-01 PROCEDURE — 99238 HOSP IP/OBS DSCHRG MGMT 30/<: CPT | Performed by: NURSE PRACTITIONER

## 2021-01-01 PROCEDURE — 80202 ASSAY OF VANCOMYCIN: CPT | Performed by: NURSE PRACTITIONER

## 2021-01-01 PROCEDURE — A9537 TC99M MEBROFENIN: HCPCS | Performed by: INTERNAL MEDICINE

## 2021-01-01 PROCEDURE — 99232 SBSQ HOSP IP/OBS MODERATE 35: CPT | Performed by: FAMILY MEDICINE

## 2021-01-01 PROCEDURE — 5A09357 ASSISTANCE WITH RESPIRATORY VENTILATION, LESS THAN 24 CONSECUTIVE HOURS, CONTINUOUS POSITIVE AIRWAY PRESSURE: ICD-10-PCS | Performed by: INTERNAL MEDICINE

## 2021-01-01 PROCEDURE — 71045 X-RAY EXAM CHEST 1 VIEW: CPT

## 2021-01-01 PROCEDURE — 87086 URINE CULTURE/COLONY COUNT: CPT | Performed by: INTERNAL MEDICINE

## 2021-01-01 PROCEDURE — 80202 ASSAY OF VANCOMYCIN: CPT | Performed by: INTERNAL MEDICINE

## 2021-01-01 PROCEDURE — 82436 ASSAY OF URINE CHLORIDE: CPT | Performed by: INTERNAL MEDICINE

## 2021-01-01 PROCEDURE — 85007 BL SMEAR W/DIFF WBC COUNT: CPT | Performed by: NURSE PRACTITIONER

## 2021-01-01 PROCEDURE — 99214 OFFICE O/P EST MOD 30 MIN: CPT | Performed by: NURSE PRACTITIONER

## 2021-01-01 PROCEDURE — 99232 SBSQ HOSP IP/OBS MODERATE 35: CPT | Performed by: PHYSICIAN ASSISTANT

## 2021-01-01 PROCEDURE — 87633 RESP VIRUS 12-25 TARGETS: CPT | Performed by: EMERGENCY MEDICINE

## 2021-01-01 PROCEDURE — P9016 RBC LEUKOCYTES REDUCED: HCPCS

## 2021-01-01 PROCEDURE — 93000 ELECTROCARDIOGRAM COMPLETE: CPT | Performed by: INTERNAL MEDICINE

## 2021-01-01 PROCEDURE — 99442 PR PHYS/QHP TELEPHONE EVALUATION 11-20 MIN: CPT | Performed by: INTERNAL MEDICINE

## 2021-01-01 PROCEDURE — 99232 SBSQ HOSP IP/OBS MODERATE 35: CPT | Performed by: SURGERY

## 2021-01-01 PROCEDURE — 85610 PROTHROMBIN TIME: CPT | Performed by: PHYSICIAN ASSISTANT

## 2021-01-01 PROCEDURE — 83605 ASSAY OF LACTIC ACID: CPT | Performed by: NURSE PRACTITIONER

## 2021-01-01 PROCEDURE — 84145 PROCALCITONIN (PCT): CPT | Performed by: NURSE PRACTITIONER

## 2021-01-01 PROCEDURE — 1125F AMNT PAIN NOTED PAIN PRSNT: CPT | Performed by: INTERNAL MEDICINE

## 2021-01-01 PROCEDURE — 80048 BASIC METABOLIC PNL TOTAL CA: CPT | Performed by: EMERGENCY MEDICINE

## 2021-01-01 PROCEDURE — 84156 ASSAY OF PROTEIN URINE: CPT | Performed by: INTERNAL MEDICINE

## 2021-01-01 PROCEDURE — 99226 PR SBSQ OBSERVATION CARE/DAY 35 MINUTES: CPT | Performed by: INTERNAL MEDICINE

## 2021-01-01 PROCEDURE — 84466 ASSAY OF TRANSFERRIN: CPT | Performed by: INTERNAL MEDICINE

## 2021-01-01 PROCEDURE — 97530 THERAPEUTIC ACTIVITIES: CPT

## 2021-01-01 PROCEDURE — 94760 N-INVAS EAR/PLS OXIMETRY 1: CPT

## 2021-01-01 PROCEDURE — 25010000002 ONDANSETRON PER 1 MG: Performed by: PHYSICIAN ASSISTANT

## 2021-01-01 PROCEDURE — 87147 CULTURE TYPE IMMUNOLOGIC: CPT | Performed by: PHYSICIAN ASSISTANT

## 2021-01-01 PROCEDURE — 99222 1ST HOSP IP/OBS MODERATE 55: CPT | Performed by: INTERNAL MEDICINE

## 2021-01-01 PROCEDURE — 99219 PR INITIAL OBSERVATION CARE/DAY 50 MINUTES: CPT | Performed by: NURSE PRACTITIONER

## 2021-01-01 PROCEDURE — 25010000002 CEFTRIAXONE SODIUM-DEXTROSE 1-3.74 GM-%(50ML) RECONSTITUTED SOLUTION: Performed by: NURSE PRACTITIONER

## 2021-01-01 PROCEDURE — 25010000002 MORPHINE SULFATE (PF) 2 MG/ML SOLUTION: Performed by: FAMILY MEDICINE

## 2021-01-01 PROCEDURE — 25010000002 METHYLPREDNISOLONE PER 125 MG: Performed by: NURSE PRACTITIONER

## 2021-01-01 PROCEDURE — 69209 REMOVE IMPACTED EAR WAX UNI: CPT | Performed by: INTERNAL MEDICINE

## 2021-01-01 PROCEDURE — 85007 BL SMEAR W/DIFF WBC COUNT: CPT | Performed by: PHYSICIAN ASSISTANT

## 2021-01-01 PROCEDURE — 97110 THERAPEUTIC EXERCISES: CPT

## 2021-01-01 PROCEDURE — 99231 SBSQ HOSP IP/OBS SF/LOW 25: CPT | Performed by: FAMILY MEDICINE

## 2021-01-01 PROCEDURE — 87040 BLOOD CULTURE FOR BACTERIA: CPT | Performed by: INTERNAL MEDICINE

## 2021-01-01 PROCEDURE — 99238 HOSP IP/OBS DSCHRG MGMT 30/<: CPT | Performed by: INTERNAL MEDICINE

## 2021-01-01 PROCEDURE — 83690 ASSAY OF LIPASE: CPT | Performed by: NURSE PRACTITIONER

## 2021-01-01 PROCEDURE — 81001 URINALYSIS AUTO W/SCOPE: CPT | Performed by: INTERNAL MEDICINE

## 2021-01-01 PROCEDURE — 93306 TTE W/DOPPLER COMPLETE: CPT | Performed by: INTERNAL MEDICINE

## 2021-01-01 PROCEDURE — 84484 ASSAY OF TROPONIN QUANT: CPT | Performed by: NURSE PRACTITIONER

## 2021-01-01 PROCEDURE — 80053 COMPREHEN METABOLIC PANEL: CPT | Performed by: FAMILY MEDICINE

## 2021-01-01 PROCEDURE — 25010000002 HYDROMORPHONE 1 MG/ML SOLUTION: Performed by: STUDENT IN AN ORGANIZED HEALTH CARE EDUCATION/TRAINING PROGRAM

## 2021-01-01 PROCEDURE — 84100 ASSAY OF PHOSPHORUS: CPT | Performed by: INTERNAL MEDICINE

## 2021-01-01 PROCEDURE — 25010000002 METHYLPREDNISOLONE PER 40 MG: Performed by: EMERGENCY MEDICINE

## 2021-01-01 PROCEDURE — 80202 ASSAY OF VANCOMYCIN: CPT

## 2021-01-01 PROCEDURE — 80048 BASIC METABOLIC PNL TOTAL CA: CPT | Performed by: INTERNAL MEDICINE

## 2021-01-01 PROCEDURE — 87635 SARS-COV-2 COVID-19 AMP PRB: CPT | Performed by: PHYSICIAN ASSISTANT

## 2021-01-01 PROCEDURE — 85025 COMPLETE CBC W/AUTO DIFF WBC: CPT | Performed by: FAMILY MEDICINE

## 2021-01-01 PROCEDURE — 25010000002 PIPERACILLIN SOD-TAZOBACTAM PER 1 G: Performed by: PHYSICIAN ASSISTANT

## 2021-01-01 PROCEDURE — 1159F MED LIST DOCD IN RCRD: CPT | Performed by: INTERNAL MEDICINE

## 2021-01-01 PROCEDURE — C9803 HOPD COVID-19 SPEC COLLECT: HCPCS

## 2021-01-01 PROCEDURE — 80048 BASIC METABOLIC PNL TOTAL CA: CPT | Performed by: NURSE PRACTITIONER

## 2021-01-01 PROCEDURE — 92610 EVALUATE SWALLOWING FUNCTION: CPT

## 2021-01-01 PROCEDURE — 76705 ECHO EXAM OF ABDOMEN: CPT

## 2021-01-01 PROCEDURE — 83880 ASSAY OF NATRIURETIC PEPTIDE: CPT | Performed by: EMERGENCY MEDICINE

## 2021-01-01 PROCEDURE — 71275 CT ANGIOGRAPHY CHEST: CPT

## 2021-01-01 PROCEDURE — 80202 ASSAY OF VANCOMYCIN: CPT | Performed by: EMERGENCY MEDICINE

## 2021-01-01 PROCEDURE — 36430 TRANSFUSION BLD/BLD COMPNT: CPT

## 2021-01-01 PROCEDURE — 25010000002 VANCOMYCIN PER 500 MG

## 2021-01-01 PROCEDURE — 83735 ASSAY OF MAGNESIUM: CPT | Performed by: NURSE PRACTITIONER

## 2021-01-01 PROCEDURE — 93296 REM INTERROG EVL PM/IDS: CPT | Performed by: INTERNAL MEDICINE

## 2021-01-01 PROCEDURE — 83880 ASSAY OF NATRIURETIC PEPTIDE: CPT | Performed by: PHYSICIAN ASSISTANT

## 2021-01-01 PROCEDURE — 25010000002 VANCOMYCIN PER 500 MG: Performed by: INTERNAL MEDICINE

## 2021-01-01 PROCEDURE — 87086 URINE CULTURE/COLONY COUNT: CPT | Performed by: NURSE PRACTITIONER

## 2021-01-01 PROCEDURE — 99239 HOSP IP/OBS DSCHRG MGMT >30: CPT | Performed by: INTERNAL MEDICINE

## 2021-01-01 PROCEDURE — 86900 BLOOD TYPING SEROLOGIC ABO: CPT

## 2021-01-01 PROCEDURE — 63710000001 PREDNISONE PER 1 MG: Performed by: INTERNAL MEDICINE

## 2021-01-01 PROCEDURE — 25010000002 PIPERACILLIN SOD-TAZOBACTAM PER 1 G: Performed by: EMERGENCY MEDICINE

## 2021-01-01 PROCEDURE — 97116 GAIT TRAINING THERAPY: CPT

## 2021-01-01 PROCEDURE — 99233 SBSQ HOSP IP/OBS HIGH 50: CPT | Performed by: FAMILY MEDICINE

## 2021-01-01 PROCEDURE — 1170F FXNL STATUS ASSESSED: CPT | Performed by: INTERNAL MEDICINE

## 2021-01-01 PROCEDURE — 97162 PT EVAL MOD COMPLEX 30 MIN: CPT

## 2021-01-01 PROCEDURE — 82272 OCCULT BLD FECES 1-3 TESTS: CPT | Performed by: INTERNAL MEDICINE

## 2021-01-01 PROCEDURE — 80053 COMPREHEN METABOLIC PANEL: CPT | Performed by: PHYSICIAN ASSISTANT

## 2021-01-01 PROCEDURE — 83880 ASSAY OF NATRIURETIC PEPTIDE: CPT | Performed by: NURSE PRACTITIONER

## 2021-01-01 PROCEDURE — 83540 ASSAY OF IRON: CPT | Performed by: INTERNAL MEDICINE

## 2021-01-01 PROCEDURE — 99498 ADVNCD CARE PLAN ADDL 30 MIN: CPT | Performed by: INTERNAL MEDICINE

## 2021-01-01 PROCEDURE — 99283 EMERGENCY DEPT VISIT LOW MDM: CPT

## 2021-01-01 PROCEDURE — 25010000002 MAGNESIUM SULFATE 2 GM/50ML SOLUTION: Performed by: EMERGENCY MEDICINE

## 2021-01-01 PROCEDURE — 25010000002 ONDANSETRON PER 1 MG: Performed by: FAMILY MEDICINE

## 2021-01-01 PROCEDURE — 99213 OFFICE O/P EST LOW 20 MIN: CPT | Performed by: INTERNAL MEDICINE

## 2021-01-01 PROCEDURE — 93280 PM DEVICE PROGR EVAL DUAL: CPT | Performed by: INTERNAL MEDICINE

## 2021-01-01 PROCEDURE — 81001 URINALYSIS AUTO W/SCOPE: CPT | Performed by: PHYSICIAN ASSISTANT

## 2021-01-01 PROCEDURE — 85027 COMPLETE CBC AUTOMATED: CPT | Performed by: NURSE PRACTITIONER

## 2021-01-01 PROCEDURE — 99223 1ST HOSP IP/OBS HIGH 75: CPT | Performed by: INTERNAL MEDICINE

## 2021-01-01 PROCEDURE — 99397 PER PM REEVAL EST PAT 65+ YR: CPT | Performed by: INTERNAL MEDICINE

## 2021-01-01 PROCEDURE — 99222 1ST HOSP IP/OBS MODERATE 55: CPT | Performed by: NURSE PRACTITIONER

## 2021-01-01 PROCEDURE — 80053 COMPREHEN METABOLIC PANEL: CPT | Performed by: INTERNAL MEDICINE

## 2021-01-01 PROCEDURE — 84484 ASSAY OF TROPONIN QUANT: CPT | Performed by: EMERGENCY MEDICINE

## 2021-01-01 PROCEDURE — 85025 COMPLETE CBC W/AUTO DIFF WBC: CPT | Performed by: PHYSICIAN ASSISTANT

## 2021-01-01 PROCEDURE — 78226 HEPATOBILIARY SYSTEM IMAGING: CPT

## 2021-01-01 PROCEDURE — 87150 DNA/RNA AMPLIFIED PROBE: CPT | Performed by: PHYSICIAN ASSISTANT

## 2021-01-01 PROCEDURE — 84145 PROCALCITONIN (PCT): CPT | Performed by: EMERGENCY MEDICINE

## 2021-01-01 PROCEDURE — 86850 RBC ANTIBODY SCREEN: CPT | Performed by: INTERNAL MEDICINE

## 2021-01-01 PROCEDURE — 84145 PROCALCITONIN (PCT): CPT | Performed by: INTERNAL MEDICINE

## 2021-01-01 PROCEDURE — 25010000002 HYDROMORPHONE PER 4 MG: Performed by: EMERGENCY MEDICINE

## 2021-01-01 PROCEDURE — 86900 BLOOD TYPING SEROLOGIC ABO: CPT | Performed by: INTERNAL MEDICINE

## 2021-01-01 PROCEDURE — 25010000002 FUROSEMIDE PER 20 MG: Performed by: EMERGENCY MEDICINE

## 2021-01-01 PROCEDURE — 85007 BL SMEAR W/DIFF WBC COUNT: CPT | Performed by: INTERNAL MEDICINE

## 2021-01-01 RX ORDER — ONDANSETRON 4 MG/1
4 TABLET, FILM COATED ORAL EVERY 6 HOURS PRN
Status: DISCONTINUED | OUTPATIENT
Start: 2021-01-01 | End: 2021-01-01 | Stop reason: HOSPADM

## 2021-01-01 RX ORDER — ONDANSETRON 2 MG/ML
4 INJECTION INTRAMUSCULAR; INTRAVENOUS EVERY 6 HOURS PRN
Status: DISCONTINUED | OUTPATIENT
Start: 2021-01-01 | End: 2021-01-01 | Stop reason: HOSPADM

## 2021-01-01 RX ORDER — ALPRAZOLAM 0.25 MG/1
TABLET ORAL
Qty: 30 TABLET | Refills: 1 | OUTPATIENT
Start: 2021-01-01

## 2021-01-01 RX ORDER — DILTIAZEM HYDROCHLORIDE 180 MG/1
180 CAPSULE, COATED, EXTENDED RELEASE ORAL DAILY
Qty: 30 CAPSULE | Refills: 1 | Status: SHIPPED | OUTPATIENT
Start: 2021-01-01 | End: 2021-01-01

## 2021-01-01 RX ORDER — CEFDINIR 300 MG/1
300 CAPSULE ORAL 2 TIMES DAILY
Qty: 10 CAPSULE | Refills: 0 | Status: SHIPPED | OUTPATIENT
Start: 2021-01-01 | End: 2021-01-01 | Stop reason: HOSPADM

## 2021-01-01 RX ORDER — DIPHENHYDRAMINE HYDROCHLORIDE, ZINC ACETATE 2; .1 G/100G; G/100G
CREAM TOPICAL 3 TIMES DAILY PRN
COMMUNITY

## 2021-01-01 RX ORDER — BUDESONIDE AND FORMOTEROL FUMARATE DIHYDRATE 80; 4.5 UG/1; UG/1
2 AEROSOL RESPIRATORY (INHALATION) 2 TIMES DAILY
Status: DISCONTINUED | OUTPATIENT
Start: 2021-01-01 | End: 2021-01-01 | Stop reason: HOSPADM

## 2021-01-01 RX ORDER — FINASTERIDE 5 MG/1
5 TABLET, FILM COATED ORAL DAILY
Status: DISCONTINUED | OUTPATIENT
Start: 2021-01-01 | End: 2021-01-01 | Stop reason: HOSPADM

## 2021-01-01 RX ORDER — SODIUM CHLORIDE 0.9 % (FLUSH) 0.9 %
3 SYRINGE (ML) INJECTION EVERY 12 HOURS SCHEDULED
Status: DISCONTINUED | OUTPATIENT
Start: 2021-01-01 | End: 2021-01-01 | Stop reason: HOSPADM

## 2021-01-01 RX ORDER — SODIUM CHLORIDE 0.9 % (FLUSH) 0.9 %
10 SYRINGE (ML) INJECTION AS NEEDED
Status: DISCONTINUED | OUTPATIENT
Start: 2021-01-01 | End: 2021-01-01 | Stop reason: HOSPADM

## 2021-01-01 RX ORDER — DOXYCYCLINE HYCLATE 100 MG/1
100 CAPSULE ORAL 2 TIMES DAILY
Qty: 28 CAPSULE | Refills: 0 | Status: SHIPPED | OUTPATIENT
Start: 2021-01-01 | End: 2021-01-01

## 2021-01-01 RX ORDER — FERROUS SULFATE 325(65) MG
TABLET ORAL
Qty: 28 TABLET | Refills: 5 | OUTPATIENT
Start: 2021-01-01

## 2021-01-01 RX ORDER — MIRTAZAPINE 15 MG/1
15 TABLET, FILM COATED ORAL NIGHTLY
Status: DISCONTINUED | OUTPATIENT
Start: 2021-01-01 | End: 2021-01-01 | Stop reason: HOSPADM

## 2021-01-01 RX ORDER — ALPRAZOLAM 0.25 MG/1
TABLET ORAL
Qty: 30 TABLET | Refills: 1 | Status: SHIPPED | OUTPATIENT
Start: 2021-01-01 | End: 2021-01-01 | Stop reason: SDUPTHER

## 2021-01-01 RX ORDER — FERROUS SULFATE 325(65) MG
325 TABLET ORAL
COMMUNITY
End: 2021-01-01 | Stop reason: HOSPADM

## 2021-01-01 RX ORDER — FUROSEMIDE 10 MG/ML
40 INJECTION INTRAMUSCULAR; INTRAVENOUS ONCE
Status: COMPLETED | OUTPATIENT
Start: 2021-01-01 | End: 2021-01-01

## 2021-01-01 RX ORDER — LIDOCAINE HYDROCHLORIDE AND EPINEPHRINE 10; 10 MG/ML; UG/ML
10 INJECTION, SOLUTION INFILTRATION; PERINEURAL ONCE
Status: COMPLETED | OUTPATIENT
Start: 2021-01-01 | End: 2021-01-01

## 2021-01-01 RX ORDER — TETRACYCLINE HYDROCHLORIDE 500 MG/1
CAPSULE ORAL
Qty: 14 CAPSULE | Refills: 0 | Status: SHIPPED | OUTPATIENT
Start: 2021-01-01 | End: 2021-01-01 | Stop reason: SDUPTHER

## 2021-01-01 RX ORDER — LIDOCAINE 50 MG/G
PATCH TOPICAL
Qty: 30 PATCH | Refills: 3 | Status: SHIPPED | OUTPATIENT
Start: 2021-01-01 | End: 2021-01-01 | Stop reason: SDUPTHER

## 2021-01-01 RX ORDER — SODIUM CHLORIDE 0.9 % (FLUSH) 0.9 %
10 SYRINGE (ML) INJECTION AS NEEDED
Status: DISCONTINUED | OUTPATIENT
Start: 2021-01-01 | End: 2021-01-01

## 2021-01-01 RX ORDER — TRAZODONE HYDROCHLORIDE 50 MG/1
50 TABLET ORAL EVERY EVENING
Status: CANCELLED | OUTPATIENT
Start: 2021-01-01

## 2021-01-01 RX ORDER — METHYLPREDNISOLONE SODIUM SUCCINATE 40 MG/ML
80 INJECTION, POWDER, LYOPHILIZED, FOR SOLUTION INTRAMUSCULAR; INTRAVENOUS ONCE
Status: COMPLETED | OUTPATIENT
Start: 2021-01-01 | End: 2021-01-01

## 2021-01-01 RX ORDER — OXYBUTYNIN CHLORIDE 5 MG/1
TABLET ORAL
Qty: 90 TABLET | Refills: 3 | Status: SHIPPED | OUTPATIENT
Start: 2021-01-01

## 2021-01-01 RX ORDER — PSEUDOEPHED/ACETAMINOPH/DIPHEN 30MG-500MG
TABLET ORAL
Qty: 180 TABLET | Refills: 3 | Status: SHIPPED | OUTPATIENT
Start: 2021-01-01

## 2021-01-01 RX ORDER — BENZONATATE 200 MG/1
200 CAPSULE ORAL 3 TIMES DAILY PRN
Qty: 45 CAPSULE | Refills: 0 | Status: SHIPPED | OUTPATIENT
Start: 2021-01-01 | End: 2021-01-01 | Stop reason: SDUPTHER

## 2021-01-01 RX ORDER — FUROSEMIDE 10 MG/ML
40 INJECTION INTRAMUSCULAR; INTRAVENOUS EVERY 6 HOURS
Status: COMPLETED | OUTPATIENT
Start: 2021-01-01 | End: 2021-01-01

## 2021-01-01 RX ORDER — ACETAMINOPHEN 650 MG/1
650 SUPPOSITORY RECTAL EVERY 4 HOURS PRN
Status: DISCONTINUED | OUTPATIENT
Start: 2021-01-01 | End: 2021-01-01 | Stop reason: HOSPADM

## 2021-01-01 RX ORDER — TORSEMIDE 100 MG/1
100 TABLET ORAL DAILY
Status: DISCONTINUED | OUTPATIENT
Start: 2021-01-01 | End: 2021-01-01

## 2021-01-01 RX ORDER — ONDANSETRON 2 MG/ML
4 INJECTION INTRAMUSCULAR; INTRAVENOUS ONCE
Status: COMPLETED | OUTPATIENT
Start: 2021-01-01 | End: 2021-01-01

## 2021-01-01 RX ORDER — MIRTAZAPINE 15 MG/1
15 TABLET, FILM COATED ORAL NIGHTLY
Status: CANCELLED | OUTPATIENT
Start: 2021-01-01

## 2021-01-01 RX ORDER — FAMOTIDINE 40 MG/1
40 TABLET, FILM COATED ORAL DAILY
Qty: 30 TABLET | Refills: 0 | Status: SHIPPED | OUTPATIENT
Start: 2021-01-01 | End: 2021-01-01

## 2021-01-01 RX ORDER — SODIUM CHLORIDE 9 MG/ML
75 INJECTION, SOLUTION INTRAVENOUS CONTINUOUS
Status: DISCONTINUED | OUTPATIENT
Start: 2021-01-01 | End: 2021-01-01

## 2021-01-01 RX ORDER — DOCUSATE SODIUM 50 MG AND SENNOSIDES 8.6 MG 8.6; 5 MG/1; MG/1
TABLET, FILM COATED ORAL
Qty: 360 TABLET | Refills: 3 | Status: SHIPPED | OUTPATIENT
Start: 2021-01-01

## 2021-01-01 RX ORDER — ALPRAZOLAM 0.25 MG/1
0.25 TABLET ORAL 2 TIMES DAILY PRN
Status: DISCONTINUED | OUTPATIENT
Start: 2021-01-01 | End: 2021-01-01

## 2021-01-01 RX ORDER — TETRACYCLINE HYDROCHLORIDE 500 MG/1
CAPSULE ORAL
Qty: 14 CAPSULE | Refills: 0 | Status: SHIPPED | OUTPATIENT
Start: 2021-01-01 | End: 2021-01-01 | Stop reason: HOSPADM

## 2021-01-01 RX ORDER — CLOPIDOGREL BISULFATE 75 MG/1
TABLET ORAL
Qty: 90 TABLET | Refills: 3 | Status: SHIPPED | OUTPATIENT
Start: 2021-01-01

## 2021-01-01 RX ORDER — POTASSIUM CHLORIDE 750 MG/1
10 CAPSULE, EXTENDED RELEASE ORAL DAILY
Status: DISCONTINUED | OUTPATIENT
Start: 2021-01-01 | End: 2021-01-01 | Stop reason: HOSPADM

## 2021-01-01 RX ORDER — CARBOXYMETHYLCELLULOSE SODIUM 5 MG/ML
1 SOLUTION/ DROPS OPHTHALMIC 3 TIMES DAILY
Status: DISCONTINUED | OUTPATIENT
Start: 2021-01-01 | End: 2021-01-01 | Stop reason: HOSPADM

## 2021-01-01 RX ORDER — DILTIAZEM HYDROCHLORIDE 240 MG/1
240 CAPSULE, COATED, EXTENDED RELEASE ORAL DAILY
COMMUNITY
End: 2021-01-01

## 2021-01-01 RX ORDER — FUROSEMIDE 20 MG/1
TABLET ORAL
Qty: 90 TABLET | Refills: 3 | Status: ON HOLD | OUTPATIENT
Start: 2021-01-01 | End: 2021-01-01

## 2021-01-01 RX ORDER — LORAZEPAM 2 MG/ML
0.25 INJECTION INTRAMUSCULAR EVERY 4 HOURS PRN
Status: DISCONTINUED | OUTPATIENT
Start: 2021-01-01 | End: 2021-01-01 | Stop reason: HOSPADM

## 2021-01-01 RX ORDER — ATORVASTATIN CALCIUM 10 MG/1
10 TABLET, FILM COATED ORAL EVERY EVENING
Qty: 90 TABLET | Refills: 3 | Status: SHIPPED | OUTPATIENT
Start: 2021-01-01 | End: 2021-01-01 | Stop reason: HOSPADM

## 2021-01-01 RX ORDER — MORPHINE SULFATE 2 MG/ML
2 INJECTION, SOLUTION INTRAMUSCULAR; INTRAVENOUS ONCE
Status: COMPLETED | OUTPATIENT
Start: 2021-01-01 | End: 2021-01-01

## 2021-01-01 RX ORDER — BENZONATATE 100 MG/1
200 CAPSULE ORAL 3 TIMES DAILY PRN
Status: DISCONTINUED | OUTPATIENT
Start: 2021-01-01 | End: 2021-01-01 | Stop reason: HOSPADM

## 2021-01-01 RX ORDER — BISACODYL 5 MG/1
5 TABLET, DELAYED RELEASE ORAL DAILY PRN
Status: DISCONTINUED | OUTPATIENT
Start: 2021-01-01 | End: 2021-01-01 | Stop reason: HOSPADM

## 2021-01-01 RX ORDER — ALPRAZOLAM 0.25 MG/1
0.25 TABLET ORAL 2 TIMES DAILY PRN
Status: DISCONTINUED | OUTPATIENT
Start: 2021-01-01 | End: 2021-01-01 | Stop reason: HOSPADM

## 2021-01-01 RX ORDER — BENZONATATE 200 MG/1
CAPSULE ORAL
Qty: 45 CAPSULE | Refills: 0 | Status: SHIPPED | OUTPATIENT
Start: 2021-01-01 | End: 2021-01-01 | Stop reason: SDUPTHER

## 2021-01-01 RX ORDER — SODIUM CHLORIDE 0.9 % (FLUSH) 0.9 %
3-10 SYRINGE (ML) INJECTION AS NEEDED
Status: DISCONTINUED | OUTPATIENT
Start: 2021-01-01 | End: 2021-01-01 | Stop reason: HOSPADM

## 2021-01-01 RX ORDER — ATORVASTATIN CALCIUM 10 MG/1
10 TABLET, FILM COATED ORAL EVERY EVENING
Status: DISCONTINUED | OUTPATIENT
Start: 2021-01-01 | End: 2021-01-01 | Stop reason: HOSPADM

## 2021-01-01 RX ORDER — AMLODIPINE BESYLATE 5 MG/1
TABLET ORAL
Qty: 135 TABLET | Refills: 3 | Status: SHIPPED | OUTPATIENT
Start: 2021-01-01 | End: 2021-01-01

## 2021-01-01 RX ORDER — DILTIAZEM HYDROCHLORIDE 240 MG/1
240 CAPSULE, COATED, EXTENDED RELEASE ORAL DAILY
Status: DISCONTINUED | OUTPATIENT
Start: 2021-01-01 | End: 2021-01-01 | Stop reason: HOSPADM

## 2021-01-01 RX ORDER — PREDNISONE 10 MG/1
TABLET ORAL
Qty: 90 TABLET | Refills: 3 | Status: SHIPPED | OUTPATIENT
Start: 2021-01-01

## 2021-01-01 RX ORDER — FERROUS SULFATE 325(65) MG
TABLET ORAL
Qty: 28 TABLET | Refills: 11 | Status: SHIPPED | OUTPATIENT
Start: 2021-01-01 | End: 2021-01-01 | Stop reason: ALTCHOICE

## 2021-01-01 RX ORDER — TAMSULOSIN HYDROCHLORIDE 0.4 MG/1
0.4 CAPSULE ORAL NIGHTLY
Status: DISCONTINUED | OUTPATIENT
Start: 2021-01-01 | End: 2021-01-01 | Stop reason: HOSPADM

## 2021-01-01 RX ORDER — FUROSEMIDE 20 MG/1
20 TABLET ORAL DAILY
Status: DISCONTINUED | OUTPATIENT
Start: 2021-01-01 | End: 2021-01-01

## 2021-01-01 RX ORDER — KIT FOR THE PREPARATION OF TECHNETIUM TC 99M MEBROFENIN 45 MG/10ML
1 INJECTION, POWDER, LYOPHILIZED, FOR SOLUTION INTRAVENOUS
Status: COMPLETED | OUTPATIENT
Start: 2021-01-01 | End: 2021-01-01

## 2021-01-01 RX ORDER — POTASSIUM CHLORIDE 750 MG/1
TABLET, FILM COATED, EXTENDED RELEASE ORAL
Qty: 90 TABLET | Refills: 3 | Status: SHIPPED | OUTPATIENT
Start: 2021-01-01 | End: 2021-01-01 | Stop reason: HOSPADM

## 2021-01-01 RX ORDER — HEPARIN SODIUM 1000 [USP'U]/ML
60 INJECTION, SOLUTION INTRAVENOUS; SUBCUTANEOUS ONCE
Status: COMPLETED | OUTPATIENT
Start: 2021-01-01 | End: 2021-01-01

## 2021-01-01 RX ORDER — POLYETHYLENE GLYCOL 3350 17 G/17G
17 POWDER, FOR SOLUTION ORAL 2 TIMES DAILY
Qty: 30 EACH | Refills: 0 | Status: SHIPPED | OUTPATIENT
Start: 2021-01-01

## 2021-01-01 RX ORDER — BENZONATATE 200 MG/1
CAPSULE ORAL
Qty: 45 CAPSULE | Refills: 11 | OUTPATIENT
Start: 2021-01-01

## 2021-01-01 RX ORDER — CEFTRIAXONE 1 G/50ML
1 INJECTION, SOLUTION INTRAVENOUS EVERY 24 HOURS
Status: COMPLETED | OUTPATIENT
Start: 2021-01-01 | End: 2021-01-01

## 2021-01-01 RX ORDER — FUROSEMIDE 40 MG/1
40 TABLET ORAL 2 TIMES DAILY
Status: DISCONTINUED | OUTPATIENT
Start: 2021-01-01 | End: 2021-01-01 | Stop reason: HOSPADM

## 2021-01-01 RX ORDER — HYDRALAZINE HYDROCHLORIDE 10 MG/1
10 TABLET, FILM COATED ORAL EVERY 8 HOURS SCHEDULED
Status: DISCONTINUED | OUTPATIENT
Start: 2021-01-01 | End: 2021-01-01 | Stop reason: HOSPADM

## 2021-01-01 RX ORDER — ACETAMINOPHEN 160 MG/5ML
650 SOLUTION ORAL EVERY 4 HOURS PRN
Status: DISCONTINUED | OUTPATIENT
Start: 2021-01-01 | End: 2021-01-01 | Stop reason: HOSPADM

## 2021-01-01 RX ORDER — METHYLPREDNISOLONE SODIUM SUCCINATE 125 MG/2ML
125 INJECTION, POWDER, LYOPHILIZED, FOR SOLUTION INTRAMUSCULAR; INTRAVENOUS ONCE
Status: COMPLETED | OUTPATIENT
Start: 2021-01-01 | End: 2021-01-01

## 2021-01-01 RX ORDER — HYDROCODONE BITARTRATE AND ACETAMINOPHEN 5; 325 MG/1; MG/1
1 TABLET ORAL EVERY 8 HOURS PRN
Status: ON HOLD | COMMUNITY
End: 2021-01-01 | Stop reason: SDUPTHER

## 2021-01-01 RX ORDER — IRON POLYSACCHARIDE COMPLEX 150 MG
150 CAPSULE ORAL DAILY
Status: DISCONTINUED | OUTPATIENT
Start: 2021-01-01 | End: 2021-01-01 | Stop reason: HOSPADM

## 2021-01-01 RX ORDER — MAGNESIUM CARB/ALUMINUM HYDROX 105-160MG
15 TABLET,CHEWABLE ORAL DAILY PRN
Status: DISCONTINUED | OUTPATIENT
Start: 2021-01-01 | End: 2021-01-01 | Stop reason: HOSPADM

## 2021-01-01 RX ORDER — PANTOPRAZOLE SODIUM 40 MG/1
40 TABLET, DELAYED RELEASE ORAL DAILY
Status: DISCONTINUED | OUTPATIENT
Start: 2021-01-01 | End: 2021-01-01 | Stop reason: HOSPADM

## 2021-01-01 RX ORDER — ESCITALOPRAM OXALATE 20 MG/1
TABLET ORAL
Qty: 90 TABLET | Refills: 3 | Status: SHIPPED | OUTPATIENT
Start: 2021-01-01

## 2021-01-01 RX ORDER — BISOPROLOL FUMARATE 10 MG/1
10 TABLET, FILM COATED ORAL
Start: 2021-01-01

## 2021-01-01 RX ORDER — LIDOCAINE 50 MG/G
1 PATCH TOPICAL
Status: DISCONTINUED | OUTPATIENT
Start: 2021-01-01 | End: 2021-01-01 | Stop reason: HOSPADM

## 2021-01-01 RX ORDER — METHYLPREDNISOLONE SODIUM SUCCINATE 40 MG/ML
40 INJECTION, POWDER, LYOPHILIZED, FOR SOLUTION INTRAMUSCULAR; INTRAVENOUS EVERY 24 HOURS
Status: DISCONTINUED | OUTPATIENT
Start: 2021-01-01 | End: 2021-01-01

## 2021-01-01 RX ORDER — PREDNISONE 10 MG/1
10 TABLET ORAL
Status: DISCONTINUED | OUTPATIENT
Start: 2021-01-01 | End: 2021-01-01 | Stop reason: HOSPADM

## 2021-01-01 RX ORDER — HEPARIN SODIUM 1000 [USP'U]/ML
30 INJECTION INTRAVENOUS; SUBCUTANEOUS AS NEEDED
Status: DISCONTINUED | OUTPATIENT
Start: 2021-01-01 | End: 2021-01-01

## 2021-01-01 RX ORDER — METHYLPREDNISOLONE SODIUM SUCCINATE 40 MG/ML
40 INJECTION, POWDER, LYOPHILIZED, FOR SOLUTION INTRAMUSCULAR; INTRAVENOUS EVERY 8 HOURS
Status: DISCONTINUED | OUTPATIENT
Start: 2021-01-01 | End: 2021-01-01

## 2021-01-01 RX ORDER — FUROSEMIDE 40 MG/1
40 TABLET ORAL 2 TIMES DAILY
Qty: 14 TABLET | Refills: 0 | Status: SHIPPED | OUTPATIENT
Start: 2021-01-01 | End: 2021-01-01 | Stop reason: HOSPADM

## 2021-01-01 RX ORDER — POLYETHYLENE GLYCOL 3350 17 G/17G
17 POWDER, FOR SOLUTION ORAL DAILY
Status: DISCONTINUED | OUTPATIENT
Start: 2021-01-01 | End: 2021-01-01

## 2021-01-01 RX ORDER — POLYETHYLENE GLYCOL 3350 17 G/17G
POWDER, FOR SOLUTION ORAL
COMMUNITY
Start: 2021-01-01 | End: 2021-01-01 | Stop reason: SDUPTHER

## 2021-01-01 RX ORDER — HEPARIN SODIUM 5000 [USP'U]/ML
5000 INJECTION, SOLUTION INTRAVENOUS; SUBCUTANEOUS EVERY 8 HOURS SCHEDULED
Status: DISCONTINUED | OUTPATIENT
Start: 2021-01-01 | End: 2021-01-01

## 2021-01-01 RX ORDER — TETRACYCLINE HYDROCHLORIDE 500 MG/1
500 CAPSULE ORAL 3 TIMES DAILY
Qty: 21 CAPSULE | Refills: 0 | Status: SHIPPED | OUTPATIENT
Start: 2021-01-01 | End: 2021-01-01 | Stop reason: SDUPTHER

## 2021-01-01 RX ORDER — ALPRAZOLAM 0.25 MG/1
0.25 TABLET ORAL 2 TIMES DAILY PRN
Qty: 30 TABLET | Refills: 0 | Status: SHIPPED | OUTPATIENT
Start: 2021-01-01

## 2021-01-01 RX ORDER — LIDOCAINE 50 MG/G
1 PATCH TOPICAL EVERY 24 HOURS
Qty: 30 PATCH | Refills: 3 | Status: SHIPPED | OUTPATIENT
Start: 2021-01-01

## 2021-01-01 RX ORDER — CYCLOBENZAPRINE HCL 10 MG
5 TABLET ORAL 3 TIMES DAILY PRN
Status: DISCONTINUED | OUTPATIENT
Start: 2021-01-01 | End: 2021-01-01 | Stop reason: HOSPADM

## 2021-01-01 RX ORDER — SODIUM CHLORIDE 0.9 % (FLUSH) 0.9 %
10 SYRINGE (ML) INJECTION EVERY 12 HOURS SCHEDULED
Status: DISCONTINUED | OUTPATIENT
Start: 2021-01-01 | End: 2021-01-01 | Stop reason: SDUPTHER

## 2021-01-01 RX ORDER — ISOSORBIDE DINITRATE 10 MG/1
10 TABLET ORAL
Status: DISCONTINUED | OUTPATIENT
Start: 2021-01-01 | End: 2021-01-01 | Stop reason: HOSPADM

## 2021-01-01 RX ORDER — CHOLECALCIFEROL (VITAMIN D3) 125 MCG
5 CAPSULE ORAL NIGHTLY
Status: DISCONTINUED | OUTPATIENT
Start: 2021-01-01 | End: 2021-01-01

## 2021-01-01 RX ORDER — CHOLECALCIFEROL (VITAMIN D3) 125 MCG
5 CAPSULE ORAL NIGHTLY
Status: DISCONTINUED | OUTPATIENT
Start: 2021-01-01 | End: 2021-01-01 | Stop reason: HOSPADM

## 2021-01-01 RX ORDER — ACETAMINOPHEN 325 MG/1
650 TABLET ORAL EVERY 4 HOURS PRN
Status: DISCONTINUED | OUTPATIENT
Start: 2021-01-01 | End: 2021-01-01 | Stop reason: HOSPADM

## 2021-01-01 RX ORDER — POLYETHYLENE GLYCOL 3350 17 G/17G
17 POWDER, FOR SOLUTION ORAL DAILY
Status: DISCONTINUED | OUTPATIENT
Start: 2021-01-01 | End: 2021-01-01 | Stop reason: HOSPADM

## 2021-01-01 RX ORDER — CLOPIDOGREL BISULFATE 75 MG/1
75 TABLET ORAL DAILY
Status: DISCONTINUED | OUTPATIENT
Start: 2021-01-01 | End: 2021-01-01 | Stop reason: HOSPADM

## 2021-01-01 RX ORDER — ALPRAZOLAM 0.25 MG/1
0.25 TABLET ORAL 2 TIMES DAILY PRN
Qty: 30 TABLET | Refills: 1 | Status: ON HOLD | OUTPATIENT
Start: 2021-01-01 | End: 2021-01-01 | Stop reason: SDUPTHER

## 2021-01-01 RX ORDER — PSEUDOEPHED/ACETAMINOPH/DIPHEN 30MG-500MG
TABLET ORAL
Qty: 45 TABLET | Refills: 1 | Status: SHIPPED | OUTPATIENT
Start: 2021-01-01 | End: 2021-01-01

## 2021-01-01 RX ORDER — FUROSEMIDE 10 MG/ML
80 INJECTION INTRAMUSCULAR; INTRAVENOUS ONCE
Status: COMPLETED | OUTPATIENT
Start: 2021-01-01 | End: 2021-01-01

## 2021-01-01 RX ORDER — MAGNESIUM CARB/ALUMINUM HYDROX 105-160MG
15 TABLET,CHEWABLE ORAL DAILY PRN
COMMUNITY

## 2021-01-01 RX ORDER — IPRATROPIUM BROMIDE AND ALBUTEROL SULFATE 2.5; .5 MG/3ML; MG/3ML
3 SOLUTION RESPIRATORY (INHALATION) EVERY 4 HOURS PRN
Status: DISCONTINUED | OUTPATIENT
Start: 2021-01-01 | End: 2021-01-01 | Stop reason: HOSPADM

## 2021-01-01 RX ORDER — LOPERAMIDE HYDROCHLORIDE 2 MG/1
2 CAPSULE ORAL 4 TIMES DAILY PRN
COMMUNITY

## 2021-01-01 RX ORDER — SODIUM CHLORIDE 9 MG/ML
100 INJECTION, SOLUTION INTRAVENOUS CONTINUOUS
Status: ACTIVE | OUTPATIENT
Start: 2021-01-01 | End: 2021-01-01

## 2021-01-01 RX ORDER — POLYETHYLENE GLYCOL 3350 17 G/17G
17 POWDER, FOR SOLUTION ORAL DAILY PRN
Status: DISCONTINUED | OUTPATIENT
Start: 2021-01-01 | End: 2021-01-01 | Stop reason: HOSPADM

## 2021-01-01 RX ORDER — ISOSORBIDE DINITRATE 10 MG/1
10 TABLET ORAL
Start: 2021-01-01

## 2021-01-01 RX ORDER — LIDOCAINE HYDROCHLORIDE 20 MG/ML
SOLUTION OROPHARYNGEAL AS NEEDED
Status: DISCONTINUED | OUTPATIENT
Start: 2021-01-01 | End: 2021-01-01 | Stop reason: HOSPADM

## 2021-01-01 RX ORDER — IPRATROPIUM BROMIDE AND ALBUTEROL SULFATE 2.5; .5 MG/3ML; MG/3ML
3 SOLUTION RESPIRATORY (INHALATION)
Status: DISCONTINUED | OUTPATIENT
Start: 2021-01-01 | End: 2021-01-01 | Stop reason: HOSPADM

## 2021-01-01 RX ORDER — L.ACID,PARA/B.BIFIDUM/S.THERM 8B CELL
1 CAPSULE ORAL 2 TIMES DAILY
Status: DISCONTINUED | OUTPATIENT
Start: 2021-01-01 | End: 2021-01-01 | Stop reason: HOSPADM

## 2021-01-01 RX ORDER — AMOXICILLIN 250 MG
2 CAPSULE ORAL 2 TIMES DAILY
Status: DISCONTINUED | OUTPATIENT
Start: 2021-01-01 | End: 2021-01-01 | Stop reason: HOSPADM

## 2021-01-01 RX ORDER — BENZONATATE 200 MG/1
200 CAPSULE ORAL 3 TIMES DAILY PRN
Qty: 45 CAPSULE | Refills: 5 | Status: SHIPPED | OUTPATIENT
Start: 2021-01-01

## 2021-01-01 RX ORDER — FAMOTIDINE 20 MG/1
40 TABLET, FILM COATED ORAL DAILY
Status: CANCELLED | OUTPATIENT
Start: 2021-01-01

## 2021-01-01 RX ORDER — FERROUS SULFATE TAB EC 324 MG (65 MG FE EQUIVALENT) 324 (65 FE) MG
324 TABLET DELAYED RESPONSE ORAL
Status: DISCONTINUED | OUTPATIENT
Start: 2021-01-01 | End: 2021-01-01 | Stop reason: HOSPADM

## 2021-01-01 RX ORDER — PANTOPRAZOLE SODIUM 40 MG/1
40 TABLET, DELAYED RELEASE ORAL
Status: DISCONTINUED | OUTPATIENT
Start: 2021-01-01 | End: 2021-01-01 | Stop reason: HOSPADM

## 2021-01-01 RX ORDER — HYDROCODONE BITARTRATE AND ACETAMINOPHEN 5; 325 MG/1; MG/1
1 TABLET ORAL EVERY 8 HOURS PRN
Status: DISCONTINUED | OUTPATIENT
Start: 2021-01-01 | End: 2021-01-01 | Stop reason: HOSPADM

## 2021-01-01 RX ORDER — SODIUM CHLORIDE 9 MG/ML
100 INJECTION, SOLUTION INTRAVENOUS CONTINUOUS
Status: DISCONTINUED | OUTPATIENT
Start: 2021-01-01 | End: 2021-01-01

## 2021-01-01 RX ORDER — SODIUM CHLORIDE 0.9 % (FLUSH) 0.9 %
10 SYRINGE (ML) INJECTION EVERY 12 HOURS SCHEDULED
Status: DISCONTINUED | OUTPATIENT
Start: 2021-01-01 | End: 2021-01-01 | Stop reason: HOSPADM

## 2021-01-01 RX ORDER — TAMSULOSIN HYDROCHLORIDE 0.4 MG/1
0.4 CAPSULE ORAL NIGHTLY
Refills: 3 | Status: DISCONTINUED | OUTPATIENT
Start: 2021-01-01 | End: 2021-01-01 | Stop reason: HOSPADM

## 2021-01-01 RX ORDER — ARFORMOTEROL TARTRATE 15 UG/2ML
15 SOLUTION RESPIRATORY (INHALATION)
Status: DISCONTINUED | OUTPATIENT
Start: 2021-01-01 | End: 2021-01-01 | Stop reason: HOSPADM

## 2021-01-01 RX ORDER — AZITHROMYCIN 250 MG/1
TABLET, FILM COATED ORAL
Qty: 3 TABLET | Refills: 0 | Status: ON HOLD | OUTPATIENT
Start: 2021-01-01 | End: 2021-01-01

## 2021-01-01 RX ORDER — AMOXICILLIN 250 MG
2 CAPSULE ORAL 2 TIMES DAILY
Qty: 60 TABLET | Refills: 0 | Status: SHIPPED | OUTPATIENT
Start: 2021-01-01 | End: 2021-01-01

## 2021-01-01 RX ORDER — CEFDINIR 300 MG/1
300 CAPSULE ORAL 2 TIMES DAILY
Qty: 14 CAPSULE | Refills: 0 | Status: SHIPPED | OUTPATIENT
Start: 2021-01-01 | End: 2021-01-01

## 2021-01-01 RX ORDER — ESCITALOPRAM OXALATE 20 MG/1
20 TABLET ORAL DAILY
Status: DISCONTINUED | OUTPATIENT
Start: 2021-01-01 | End: 2021-01-01 | Stop reason: HOSPADM

## 2021-01-01 RX ORDER — ALBUTEROL SULFATE 2.5 MG/3ML
2.5 SOLUTION RESPIRATORY (INHALATION) EVERY 6 HOURS PRN
Status: DISCONTINUED | OUTPATIENT
Start: 2021-01-01 | End: 2021-01-01 | Stop reason: HOSPADM

## 2021-01-01 RX ORDER — HEPARIN SODIUM 1000 [USP'U]/ML
60 INJECTION INTRAVENOUS; SUBCUTANEOUS AS NEEDED
Status: DISCONTINUED | OUTPATIENT
Start: 2021-01-01 | End: 2021-01-01

## 2021-01-01 RX ORDER — BISACODYL 10 MG
10 SUPPOSITORY, RECTAL RECTAL DAILY
Status: DISCONTINUED | OUTPATIENT
Start: 2021-01-01 | End: 2021-01-01 | Stop reason: HOSPADM

## 2021-01-01 RX ORDER — DIPHENHYDRAMINE HYDROCHLORIDE, ZINC ACETATE 2; .1 G/100G; G/100G
CREAM TOPICAL 3 TIMES DAILY PRN
Status: DISCONTINUED | OUTPATIENT
Start: 2021-01-01 | End: 2021-01-01 | Stop reason: HOSPADM

## 2021-01-01 RX ORDER — ARFORMOTEROL TARTRATE 15 UG/2ML
15 SOLUTION RESPIRATORY (INHALATION)
Qty: 120 ML | Refills: 0 | Status: SHIPPED | OUTPATIENT
Start: 2021-01-01 | End: 2021-01-01 | Stop reason: HOSPADM

## 2021-01-01 RX ORDER — BUDESONIDE 0.5 MG/2ML
0.5 INHALANT ORAL
Qty: 120 ML | Refills: 0 | Status: SHIPPED | OUTPATIENT
Start: 2021-01-01 | End: 2021-01-01 | Stop reason: HOSPADM

## 2021-01-01 RX ORDER — DILTIAZEM HYDROCHLORIDE 240 MG/1
CAPSULE, COATED, EXTENDED RELEASE ORAL
Qty: 30 CAPSULE | Refills: 11 | Status: SHIPPED | OUTPATIENT
Start: 2021-01-01 | End: 2021-01-01 | Stop reason: HOSPADM

## 2021-01-01 RX ORDER — TRAZODONE HYDROCHLORIDE 50 MG/1
50 TABLET ORAL EVERY EVENING
Status: DISCONTINUED | OUTPATIENT
Start: 2021-01-01 | End: 2021-01-01 | Stop reason: HOSPADM

## 2021-01-01 RX ORDER — HYDROCODONE BITARTRATE AND ACETAMINOPHEN 5; 325 MG/1; MG/1
1 TABLET ORAL EVERY 8 HOURS PRN
Qty: 12 TABLET | Refills: 0 | Status: SHIPPED | OUTPATIENT
Start: 2021-01-01

## 2021-01-01 RX ORDER — ASPIRIN 81 MG/1
81 TABLET, CHEWABLE ORAL DAILY
Status: DISCONTINUED | OUTPATIENT
Start: 2021-01-01 | End: 2021-01-01 | Stop reason: HOSPADM

## 2021-01-01 RX ORDER — POLYETHYLENE GLYCOL 3350 17 G/17G
POWDER, FOR SOLUTION ORAL
Qty: 510 G | Refills: 11 | Status: ON HOLD | OUTPATIENT
Start: 2021-01-01 | End: 2021-01-01

## 2021-01-01 RX ORDER — FAMOTIDINE 20 MG/1
40 TABLET, FILM COATED ORAL DAILY
Status: DISCONTINUED | OUTPATIENT
Start: 2021-01-01 | End: 2021-01-01 | Stop reason: HOSPADM

## 2021-01-01 RX ORDER — ALPRAZOLAM 0.25 MG/1
0.25 TABLET ORAL 4 TIMES DAILY PRN
Status: DISCONTINUED | OUTPATIENT
Start: 2021-01-01 | End: 2021-01-01 | Stop reason: HOSPADM

## 2021-01-01 RX ORDER — CEFTRIAXONE 1 G/50ML
1 INJECTION, SOLUTION INTRAVENOUS EVERY 24 HOURS
Status: DISCONTINUED | OUTPATIENT
Start: 2021-01-01 | End: 2021-01-01 | Stop reason: HOSPADM

## 2021-01-01 RX ORDER — AMLODIPINE BESYLATE 5 MG/1
TABLET ORAL
Qty: 45 TABLET | Refills: 3 | Status: SHIPPED | OUTPATIENT
Start: 2021-01-01 | End: 2021-01-01

## 2021-01-01 RX ORDER — CARBOXYMETHYLCELLULOSE SODIUM 5 MG/ML
1 SOLUTION/ DROPS OPHTHALMIC 3 TIMES DAILY PRN
Status: DISCONTINUED | OUTPATIENT
Start: 2021-01-01 | End: 2021-01-01 | Stop reason: HOSPADM

## 2021-01-01 RX ORDER — ESCITALOPRAM OXALATE 10 MG/1
10 TABLET ORAL DAILY
Status: DISCONTINUED | OUTPATIENT
Start: 2021-01-01 | End: 2021-01-01 | Stop reason: HOSPADM

## 2021-01-01 RX ORDER — TORSEMIDE 20 MG/1
40 TABLET ORAL DAILY
Status: DISCONTINUED | OUTPATIENT
Start: 2021-01-01 | End: 2021-01-01

## 2021-01-01 RX ORDER — FINASTERIDE 5 MG/1
TABLET, FILM COATED ORAL
Qty: 90 TABLET | Refills: 3 | Status: SHIPPED | OUTPATIENT
Start: 2021-01-01 | End: 2021-01-01 | Stop reason: HOSPADM

## 2021-01-01 RX ORDER — HYDROMORPHONE HYDROCHLORIDE 1 MG/ML
0.25 INJECTION, SOLUTION INTRAMUSCULAR; INTRAVENOUS; SUBCUTANEOUS
Status: DISCONTINUED | OUTPATIENT
Start: 2021-01-01 | End: 2021-01-01 | Stop reason: HOSPADM

## 2021-01-01 RX ORDER — BUDESONIDE 0.5 MG/2ML
0.5 INHALANT ORAL
Status: DISCONTINUED | OUTPATIENT
Start: 2021-01-01 | End: 2021-01-01 | Stop reason: HOSPADM

## 2021-01-01 RX ORDER — ECHINACEA PURPUREA EXTRACT 125 MG
1 TABLET ORAL AS NEEDED
COMMUNITY

## 2021-01-01 RX ORDER — IPRATROPIUM BROMIDE AND ALBUTEROL SULFATE 2.5; .5 MG/3ML; MG/3ML
3 SOLUTION RESPIRATORY (INHALATION) EVERY 6 HOURS
Status: DISCONTINUED | OUTPATIENT
Start: 2021-01-01 | End: 2021-01-01 | Stop reason: HOSPADM

## 2021-01-01 RX ORDER — BISACODYL 10 MG
10 SUPPOSITORY, RECTAL RECTAL DAILY PRN
Status: DISCONTINUED | OUTPATIENT
Start: 2021-01-01 | End: 2021-01-01 | Stop reason: HOSPADM

## 2021-01-01 RX ORDER — PREDNISONE 20 MG/1
40 TABLET ORAL
Status: DISCONTINUED | OUTPATIENT
Start: 2021-01-01 | End: 2021-01-01 | Stop reason: HOSPADM

## 2021-01-01 RX ORDER — DOCUSATE SODIUM 100 MG/1
100 CAPSULE, LIQUID FILLED ORAL 2 TIMES DAILY PRN
Status: DISCONTINUED | OUTPATIENT
Start: 2021-01-01 | End: 2021-01-01 | Stop reason: HOSPADM

## 2021-01-01 RX ORDER — MAGNESIUM SULFATE HEPTAHYDRATE 40 MG/ML
2 INJECTION, SOLUTION INTRAVENOUS ONCE
Status: COMPLETED | OUTPATIENT
Start: 2021-01-01 | End: 2021-01-01

## 2021-01-01 RX ORDER — POLYETHYLENE GLYCOL 3350 17 G/17G
17 POWDER, FOR SOLUTION ORAL 2 TIMES DAILY
Qty: 30 EACH | Refills: 0 | Status: SHIPPED | OUTPATIENT
Start: 2021-01-01 | End: 2021-01-01

## 2021-01-01 RX ORDER — AMOXICILLIN 250 MG
1 CAPSULE ORAL 2 TIMES DAILY
Status: DISCONTINUED | OUTPATIENT
Start: 2021-01-01 | End: 2021-01-01 | Stop reason: HOSPADM

## 2021-01-01 RX ORDER — SODIUM CHLORIDE 450 MG/100ML
75 INJECTION, SOLUTION INTRAVENOUS CONTINUOUS
Status: DISCONTINUED | OUTPATIENT
Start: 2021-01-01 | End: 2021-01-01

## 2021-01-01 RX ORDER — OXYBUTYNIN CHLORIDE 5 MG/1
5 TABLET ORAL NIGHTLY
Status: DISCONTINUED | OUTPATIENT
Start: 2021-01-01 | End: 2021-01-01 | Stop reason: HOSPADM

## 2021-01-01 RX ORDER — HEPARIN SODIUM 10000 [USP'U]/100ML
12 INJECTION, SOLUTION INTRAVENOUS
Status: DISCONTINUED | OUTPATIENT
Start: 2021-01-01 | End: 2021-01-01

## 2021-01-01 RX ORDER — DILTIAZEM HYDROCHLORIDE 180 MG/1
180 CAPSULE, COATED, EXTENDED RELEASE ORAL DAILY
Qty: 90 CAPSULE | Refills: 3 | Status: SHIPPED | OUTPATIENT
Start: 2021-01-01 | End: 2021-01-01 | Stop reason: DRUGHIGH

## 2021-01-01 RX ORDER — POLYETHYLENE GLYCOL 3350 17 G/17G
17 POWDER, FOR SOLUTION ORAL 2 TIMES DAILY
Status: DISCONTINUED | OUTPATIENT
Start: 2021-01-01 | End: 2021-01-01 | Stop reason: HOSPADM

## 2021-01-01 RX ORDER — BUMETANIDE 0.25 MG/ML
4 INJECTION INTRAMUSCULAR; INTRAVENOUS EVERY 6 HOURS
Status: COMPLETED | OUTPATIENT
Start: 2021-01-01 | End: 2021-01-01

## 2021-01-01 RX ORDER — DIAPER,BRIEF,INFANT-TODD,DISP
1 EACH MISCELLANEOUS 2 TIMES DAILY
COMMUNITY

## 2021-01-01 RX ORDER — BISOPROLOL FUMARATE 5 MG/1
10 TABLET, FILM COATED ORAL
Status: DISCONTINUED | OUTPATIENT
Start: 2021-01-01 | End: 2021-01-01 | Stop reason: HOSPADM

## 2021-01-01 RX ORDER — ALPRAZOLAM 0.25 MG/1
TABLET ORAL
Qty: 30 TABLET | Refills: 1 | Status: SHIPPED | OUTPATIENT
Start: 2021-01-01 | End: 2021-01-01

## 2021-01-01 RX ORDER — CEFTRIAXONE 1 G/50ML
1 INJECTION, SOLUTION INTRAVENOUS ONCE
Status: COMPLETED | OUTPATIENT
Start: 2021-01-01 | End: 2021-01-01

## 2021-01-01 RX ORDER — AMLODIPINE BESYLATE 5 MG/1
5 TABLET ORAL
Status: DISCONTINUED | OUTPATIENT
Start: 2021-01-01 | End: 2021-01-01 | Stop reason: HOSPADM

## 2021-01-01 RX ORDER — SODIUM CHLORIDE 0.9 % (FLUSH) 0.9 %
10 SYRINGE (ML) INJECTION AS NEEDED
Status: DISCONTINUED | OUTPATIENT
Start: 2021-01-01 | End: 2021-01-01 | Stop reason: SDUPTHER

## 2021-01-01 RX ORDER — FAMOTIDINE 40 MG/1
TABLET, FILM COATED ORAL
Qty: 90 TABLET | Refills: 3 | Status: SHIPPED | OUTPATIENT
Start: 2021-01-01

## 2021-01-01 RX ORDER — FUROSEMIDE 10 MG/ML
80 INJECTION INTRAMUSCULAR; INTRAVENOUS EVERY 4 HOURS
Status: COMPLETED | OUTPATIENT
Start: 2021-01-01 | End: 2021-01-01

## 2021-01-01 RX ADMIN — ENOXAPARIN SODIUM 40 MG: 40 INJECTION SUBCUTANEOUS at 17:29

## 2021-01-01 RX ADMIN — IPRATROPIUM BROMIDE AND ALBUTEROL SULFATE 3 ML: .5; 3 SOLUTION RESPIRATORY (INHALATION) at 12:55

## 2021-01-01 RX ADMIN — BISOPROLOL FUMARATE 10 MG: 5 TABLET ORAL at 08:15

## 2021-01-01 RX ADMIN — Medication 1 CAPSULE: at 20:03

## 2021-01-01 RX ADMIN — OXYBUTYNIN CHLORIDE 5 MG: 5 TABLET ORAL at 20:46

## 2021-01-01 RX ADMIN — ISOSORBIDE DINITRATE 10 MG: 10 TABLET ORAL at 17:02

## 2021-01-01 RX ADMIN — IPRATROPIUM BROMIDE AND ALBUTEROL SULFATE 3 ML: .5; 3 SOLUTION RESPIRATORY (INHALATION) at 23:01

## 2021-01-01 RX ADMIN — ACETAMINOPHEN 650 MG: 325 TABLET ORAL at 18:44

## 2021-01-01 RX ADMIN — BUDESONIDE AND FORMOTEROL FUMARATE DIHYDRATE 2 PUFF: 80; 4.5 AEROSOL RESPIRATORY (INHALATION) at 07:06

## 2021-01-01 RX ADMIN — MIRTAZAPINE 15 MG: 15 TABLET, FILM COATED ORAL at 21:14

## 2021-01-01 RX ADMIN — IPRATROPIUM BROMIDE AND ALBUTEROL SULFATE 3 ML: .5; 3 SOLUTION RESPIRATORY (INHALATION) at 12:30

## 2021-01-01 RX ADMIN — CARBOXYMETHYLCELLULOSE SODIUM 1 DROP: 5 SOLUTION/ DROPS OPHTHALMIC at 08:52

## 2021-01-01 RX ADMIN — FINASTERIDE 5 MG: 5 TABLET, FILM COATED ORAL at 08:41

## 2021-01-01 RX ADMIN — IPRATROPIUM BROMIDE AND ALBUTEROL SULFATE 3 ML: .5; 3 SOLUTION RESPIRATORY (INHALATION) at 06:48

## 2021-01-01 RX ADMIN — ALPRAZOLAM 0.25 MG: 0.25 TABLET ORAL at 20:46

## 2021-01-01 RX ADMIN — ATORVASTATIN CALCIUM 10 MG: 10 TABLET, FILM COATED ORAL at 16:43

## 2021-01-01 RX ADMIN — PANTOPRAZOLE SODIUM 40 MG: 40 TABLET, DELAYED RELEASE ORAL at 08:08

## 2021-01-01 RX ADMIN — CLOPIDOGREL BISULFATE 75 MG: 75 TABLET ORAL at 08:41

## 2021-01-01 RX ADMIN — Medication 1 CAPSULE: at 08:18

## 2021-01-01 RX ADMIN — ISOSORBIDE DINITRATE 10 MG: 10 TABLET ORAL at 14:30

## 2021-01-01 RX ADMIN — IPRATROPIUM BROMIDE AND ALBUTEROL SULFATE 3 ML: .5; 3 SOLUTION RESPIRATORY (INHALATION) at 13:50

## 2021-01-01 RX ADMIN — TAMSULOSIN HYDROCHLORIDE 0.4 MG: 0.4 CAPSULE ORAL at 02:04

## 2021-01-01 RX ADMIN — SODIUM CHLORIDE, PRESERVATIVE FREE 10 ML: 5 INJECTION INTRAVENOUS at 21:22

## 2021-01-01 RX ADMIN — TAMSULOSIN HYDROCHLORIDE 0.4 MG: 0.4 CAPSULE ORAL at 20:10

## 2021-01-01 RX ADMIN — HEPARIN SODIUM AND DEXTROSE 11.98 UNITS/KG/HR: 10000; 5 INJECTION INTRAVENOUS at 20:46

## 2021-01-01 RX ADMIN — FAMOTIDINE 40 MG: 20 TABLET ORAL at 08:03

## 2021-01-01 RX ADMIN — SODIUM CHLORIDE 100 ML/HR: 9 INJECTION, SOLUTION INTRAVENOUS at 15:14

## 2021-01-01 RX ADMIN — PANTOPRAZOLE SODIUM 40 MG: 40 TABLET, DELAYED RELEASE ORAL at 08:51

## 2021-01-01 RX ADMIN — CLOPIDOGREL BISULFATE 75 MG: 75 TABLET ORAL at 08:03

## 2021-01-01 RX ADMIN — ALPRAZOLAM 0.25 MG: 0.25 TABLET ORAL at 11:11

## 2021-01-01 RX ADMIN — SODIUM CHLORIDE, PRESERVATIVE FREE 10 ML: 5 INJECTION INTRAVENOUS at 08:27

## 2021-01-01 RX ADMIN — IPRATROPIUM BROMIDE AND ALBUTEROL SULFATE 3 ML: .5; 3 SOLUTION RESPIRATORY (INHALATION) at 19:17

## 2021-01-01 RX ADMIN — SODIUM CHLORIDE, PRESERVATIVE FREE 10 ML: 5 INJECTION INTRAVENOUS at 21:35

## 2021-01-01 RX ADMIN — MUPIROCIN: 20 OINTMENT TOPICAL at 21:34

## 2021-01-01 RX ADMIN — HYDROMORPHONE HYDROCHLORIDE 0.5 MG: 1 INJECTION, SOLUTION INTRAMUSCULAR; INTRAVENOUS; SUBCUTANEOUS at 15:48

## 2021-01-01 RX ADMIN — ENOXAPARIN SODIUM 40 MG: 40 INJECTION SUBCUTANEOUS at 16:00

## 2021-01-01 RX ADMIN — ALPRAZOLAM 0.25 MG: 0.25 TABLET ORAL at 23:17

## 2021-01-01 RX ADMIN — FINASTERIDE 5 MG: 5 TABLET, FILM COATED ORAL at 08:04

## 2021-01-01 RX ADMIN — HYDROCODONE BITARTRATE AND ACETAMINOPHEN 1 TABLET: 5; 325 TABLET ORAL at 08:41

## 2021-01-01 RX ADMIN — APIXABAN 5 MG: 5 TABLET, FILM COATED ORAL at 21:01

## 2021-01-01 RX ADMIN — HEPARIN SODIUM 4510 UNITS: 1000 INJECTION INTRAVENOUS; SUBCUTANEOUS at 16:48

## 2021-01-01 RX ADMIN — ACETAMINOPHEN 650 MG: 325 TABLET ORAL at 03:01

## 2021-01-01 RX ADMIN — LIDOCAINE 1 PATCH: 50 PATCH CUTANEOUS at 17:19

## 2021-01-01 RX ADMIN — MIRTAZAPINE 15 MG: 15 TABLET, FILM COATED ORAL at 21:19

## 2021-01-01 RX ADMIN — ALPRAZOLAM 0.25 MG: 0.25 TABLET ORAL at 09:38

## 2021-01-01 RX ADMIN — HYDROMORPHONE HYDROCHLORIDE 0.5 MG: 1 INJECTION, SOLUTION INTRAMUSCULAR; INTRAVENOUS; SUBCUTANEOUS at 06:28

## 2021-01-01 RX ADMIN — ISOSORBIDE DINITRATE 10 MG: 10 TABLET ORAL at 18:32

## 2021-01-01 RX ADMIN — SODIUM CHLORIDE, PRESERVATIVE FREE 10 ML: 5 INJECTION INTRAVENOUS at 21:04

## 2021-01-01 RX ADMIN — HYDROMORPHONE HYDROCHLORIDE 0.5 MG: 1 INJECTION, SOLUTION INTRAMUSCULAR; INTRAVENOUS; SUBCUTANEOUS at 00:32

## 2021-01-01 RX ADMIN — APIXABAN 5 MG: 5 TABLET, FILM COATED ORAL at 21:50

## 2021-01-01 RX ADMIN — ESCITALOPRAM OXALATE 20 MG: 20 TABLET ORAL at 08:52

## 2021-01-01 RX ADMIN — TAZOBACTAM SODIUM AND PIPERACILLIN SODIUM 3.38 G: 375; 3 INJECTION, SOLUTION INTRAVENOUS at 03:28

## 2021-01-01 RX ADMIN — Medication 1 CAPSULE: at 21:59

## 2021-01-01 RX ADMIN — FUROSEMIDE 80 MG: 10 INJECTION, SOLUTION INTRAMUSCULAR; INTRAVENOUS at 17:42

## 2021-01-01 RX ADMIN — HYDROCODONE BITARTRATE AND ACETAMINOPHEN 1 TABLET: 5; 325 TABLET ORAL at 05:58

## 2021-01-01 RX ADMIN — FINASTERIDE 5 MG: 5 TABLET, FILM COATED ORAL at 08:20

## 2021-01-01 RX ADMIN — SODIUM CHLORIDE, PRESERVATIVE FREE 3 ML: 5 INJECTION INTRAVENOUS at 08:20

## 2021-01-01 RX ADMIN — PREDNISONE 40 MG: 20 TABLET ORAL at 08:40

## 2021-01-01 RX ADMIN — BUMETANIDE 4 MG: 0.25 INJECTION INTRAMUSCULAR; INTRAVENOUS at 16:34

## 2021-01-01 RX ADMIN — VANCOMYCIN HYDROCHLORIDE 500 MG: 500 INJECTION, POWDER, LYOPHILIZED, FOR SOLUTION INTRAVENOUS at 08:15

## 2021-01-01 RX ADMIN — ASPIRIN 81 MG: 81 TABLET, CHEWABLE ORAL at 08:20

## 2021-01-01 RX ADMIN — IPRATROPIUM BROMIDE AND ALBUTEROL SULFATE 3 ML: .5; 3 SOLUTION RESPIRATORY (INHALATION) at 00:55

## 2021-01-01 RX ADMIN — POLYETHYLENE GLYCOL 3350 17 G: 17 POWDER, FOR SOLUTION ORAL at 08:02

## 2021-01-01 RX ADMIN — TAZOBACTAM SODIUM AND PIPERACILLIN SODIUM 3.38 G: 375; 3 INJECTION, SOLUTION INTRAVENOUS at 02:00

## 2021-01-01 RX ADMIN — BISOPROLOL FUMARATE 10 MG: 5 TABLET ORAL at 08:07

## 2021-01-01 RX ADMIN — VANCOMYCIN HYDROCHLORIDE 500 MG: 500 INJECTION, POWDER, LYOPHILIZED, FOR SOLUTION INTRAVENOUS at 08:55

## 2021-01-01 RX ADMIN — STANDARDIZED SENNA CONCENTRATE AND DOCUSATE SODIUM 1 TABLET: 8.6; 5 TABLET, FILM COATED ORAL at 21:50

## 2021-01-01 RX ADMIN — LIDOCAINE 1 PATCH: 50 PATCH CUTANEOUS at 18:41

## 2021-01-01 RX ADMIN — ATORVASTATIN CALCIUM 10 MG: 10 TABLET, FILM COATED ORAL at 16:33

## 2021-01-01 RX ADMIN — CARBOXYMETHYLCELLULOSE SODIUM 1 DROP: 5 SOLUTION/ DROPS OPHTHALMIC at 08:27

## 2021-01-01 RX ADMIN — IPRATROPIUM BROMIDE AND ALBUTEROL SULFATE 3 ML: .5; 3 SOLUTION RESPIRATORY (INHALATION) at 12:52

## 2021-01-01 RX ADMIN — IPRATROPIUM BROMIDE AND ALBUTEROL SULFATE 3 ML: .5; 3 SOLUTION RESPIRATORY (INHALATION) at 12:40

## 2021-01-01 RX ADMIN — PREDNISONE 10 MG: 10 TABLET ORAL at 08:03

## 2021-01-01 RX ADMIN — SODIUM CHLORIDE, PRESERVATIVE FREE 10 ML: 5 INJECTION INTRAVENOUS at 08:28

## 2021-01-01 RX ADMIN — CARBOXYMETHYLCELLULOSE SODIUM 1 DROP: 5 SOLUTION/ DROPS OPHTHALMIC at 21:50

## 2021-01-01 RX ADMIN — TORSEMIDE 100 MG: 100 TABLET ORAL at 10:26

## 2021-01-01 RX ADMIN — OXYBUTYNIN CHLORIDE 5 MG: 5 TABLET ORAL at 20:53

## 2021-01-01 RX ADMIN — FINASTERIDE 5 MG: 5 TABLET, FILM COATED ORAL at 08:15

## 2021-01-01 RX ADMIN — FUROSEMIDE 40 MG: 10 INJECTION, SOLUTION INTRAVENOUS at 16:58

## 2021-01-01 RX ADMIN — IPRATROPIUM BROMIDE AND ALBUTEROL SULFATE 3 ML: .5; 3 SOLUTION RESPIRATORY (INHALATION) at 01:23

## 2021-01-01 RX ADMIN — IPRATROPIUM BROMIDE AND ALBUTEROL SULFATE 3 ML: .5; 3 SOLUTION RESPIRATORY (INHALATION) at 07:09

## 2021-01-01 RX ADMIN — CLOPIDOGREL BISULFATE 75 MG: 75 TABLET ORAL at 08:08

## 2021-01-01 RX ADMIN — MIRTAZAPINE 15 MG: 15 TABLET, FILM COATED ORAL at 20:53

## 2021-01-01 RX ADMIN — VANCOMYCIN HYDROCHLORIDE 500 MG: 500 INJECTION, POWDER, LYOPHILIZED, FOR SOLUTION INTRAVENOUS at 09:21

## 2021-01-01 RX ADMIN — SODIUM CHLORIDE, PRESERVATIVE FREE 10 ML: 5 INJECTION INTRAVENOUS at 20:54

## 2021-01-01 RX ADMIN — HYDROCODONE BITARTRATE AND ACETAMINOPHEN 1 TABLET: 5; 325 TABLET ORAL at 15:19

## 2021-01-01 RX ADMIN — PREDNISONE 10 MG: 10 TABLET ORAL at 08:41

## 2021-01-01 RX ADMIN — BUDESONIDE 0.5 MG: 0.5 INHALANT RESPIRATORY (INHALATION) at 18:30

## 2021-01-01 RX ADMIN — POLYETHYLENE GLYCOL 3350 17 G: 17 POWDER, FOR SOLUTION ORAL at 08:45

## 2021-01-01 RX ADMIN — ISOSORBIDE DINITRATE 10 MG: 10 TABLET ORAL at 16:52

## 2021-01-01 RX ADMIN — SODIUM CHLORIDE, PRESERVATIVE FREE 10 ML: 5 INJECTION INTRAVENOUS at 21:14

## 2021-01-01 RX ADMIN — Medication 1 CAPSULE: at 21:22

## 2021-01-01 RX ADMIN — IPRATROPIUM BROMIDE AND ALBUTEROL SULFATE 3 ML: .5; 3 SOLUTION RESPIRATORY (INHALATION) at 00:39

## 2021-01-01 RX ADMIN — LIDOCAINE 1 PATCH: 50 PATCH CUTANEOUS at 17:01

## 2021-01-01 RX ADMIN — MIRTAZAPINE 15 MG: 15 TABLET, FILM COATED ORAL at 21:03

## 2021-01-01 RX ADMIN — FERROUS SULFATE TAB EC 324 MG (65 MG FE EQUIVALENT) 324 MG: 324 (65 FE) TABLET DELAYED RESPONSE at 08:08

## 2021-01-01 RX ADMIN — ASPIRIN 81 MG CHEWABLE TABLET 81 MG: 81 TABLET CHEWABLE at 08:21

## 2021-01-01 RX ADMIN — IPRATROPIUM BROMIDE AND ALBUTEROL SULFATE 3 ML: .5; 3 SOLUTION RESPIRATORY (INHALATION) at 06:53

## 2021-01-01 RX ADMIN — HYDROMORPHONE HYDROCHLORIDE 0.5 MG: 1 INJECTION, SOLUTION INTRAMUSCULAR; INTRAVENOUS; SUBCUTANEOUS at 08:03

## 2021-01-01 RX ADMIN — ARFORMOTEROL TARTRATE 15 MCG: 15 SOLUTION RESPIRATORY (INHALATION) at 18:09

## 2021-01-01 RX ADMIN — MUPIROCIN: 20 OINTMENT TOPICAL at 08:26

## 2021-01-01 RX ADMIN — ACETAMINOPHEN 650 MG: 325 TABLET ORAL at 13:16

## 2021-01-01 RX ADMIN — IPRATROPIUM BROMIDE AND ALBUTEROL SULFATE 3 ML: .5; 3 SOLUTION RESPIRATORY (INHALATION) at 06:51

## 2021-01-01 RX ADMIN — FINASTERIDE 5 MG: 5 TABLET, FILM COATED ORAL at 08:21

## 2021-01-01 RX ADMIN — TAMSULOSIN HYDROCHLORIDE 0.4 MG: 0.4 CAPSULE ORAL at 20:53

## 2021-01-01 RX ADMIN — OXYBUTYNIN CHLORIDE 5 MG: 5 TABLET ORAL at 21:19

## 2021-01-01 RX ADMIN — TAZOBACTAM SODIUM AND PIPERACILLIN SODIUM 3.38 G: 375; 3 INJECTION, SOLUTION INTRAVENOUS at 10:19

## 2021-01-01 RX ADMIN — ATORVASTATIN CALCIUM 10 MG: 10 TABLET, FILM COATED ORAL at 17:12

## 2021-01-01 RX ADMIN — SODIUM CHLORIDE, PRESERVATIVE FREE 3 ML: 5 INJECTION INTRAVENOUS at 20:48

## 2021-01-01 RX ADMIN — STANDARDIZED SENNA CONCENTRATE AND DOCUSATE SODIUM 1 TABLET: 8.6; 5 TABLET, FILM COATED ORAL at 21:00

## 2021-01-01 RX ADMIN — TAMSULOSIN HYDROCHLORIDE 0.4 MG: 0.4 CAPSULE ORAL at 21:13

## 2021-01-01 RX ADMIN — CEFTRIAXONE 1 G: 1 INJECTION, SOLUTION INTRAVENOUS at 16:05

## 2021-01-01 RX ADMIN — METHYLPREDNISOLONE SODIUM SUCCINATE 40 MG: 40 INJECTION, POWDER, FOR SOLUTION INTRAMUSCULAR; INTRAVENOUS at 05:40

## 2021-01-01 RX ADMIN — HYDROCODONE BITARTRATE AND ACETAMINOPHEN 1 TABLET: 5; 325 TABLET ORAL at 03:31

## 2021-01-01 RX ADMIN — IPRATROPIUM BROMIDE AND ALBUTEROL SULFATE 3 ML: .5; 3 SOLUTION RESPIRATORY (INHALATION) at 07:10

## 2021-01-01 RX ADMIN — HYDROMORPHONE HYDROCHLORIDE 0.5 MG: 1 INJECTION, SOLUTION INTRAMUSCULAR; INTRAVENOUS; SUBCUTANEOUS at 20:07

## 2021-01-01 RX ADMIN — METHYLPREDNISOLONE SODIUM SUCCINATE 40 MG: 40 INJECTION, POWDER, FOR SOLUTION INTRAMUSCULAR; INTRAVENOUS at 21:09

## 2021-01-01 RX ADMIN — ESCITALOPRAM OXALATE 20 MG: 20 TABLET ORAL at 08:32

## 2021-01-01 RX ADMIN — HYDROMORPHONE HYDROCHLORIDE 0.5 MG: 1 INJECTION, SOLUTION INTRAMUSCULAR; INTRAVENOUS; SUBCUTANEOUS at 05:20

## 2021-01-01 RX ADMIN — BUDESONIDE AND FORMOTEROL FUMARATE DIHYDRATE 2 PUFF: 80; 4.5 AEROSOL RESPIRATORY (INHALATION) at 19:31

## 2021-01-01 RX ADMIN — ALPRAZOLAM 0.25 MG: 0.25 TABLET ORAL at 08:09

## 2021-01-01 RX ADMIN — IPRATROPIUM BROMIDE AND ALBUTEROL SULFATE 3 ML: .5; 3 SOLUTION RESPIRATORY (INHALATION) at 12:06

## 2021-01-01 RX ADMIN — SODIUM CHLORIDE, PRESERVATIVE FREE 10 ML: 5 INJECTION INTRAVENOUS at 21:41

## 2021-01-01 RX ADMIN — SODIUM CHLORIDE, PRESERVATIVE FREE 10 ML: 5 INJECTION INTRAVENOUS at 08:15

## 2021-01-01 RX ADMIN — ESCITALOPRAM OXALATE 20 MG: 20 TABLET ORAL at 08:41

## 2021-01-01 RX ADMIN — MUPIROCIN 1 APPLICATION: 20 OINTMENT TOPICAL at 21:46

## 2021-01-01 RX ADMIN — SODIUM CHLORIDE, PRESERVATIVE FREE 10 ML: 5 INJECTION INTRAVENOUS at 05:20

## 2021-01-01 RX ADMIN — TAZOBACTAM SODIUM AND PIPERACILLIN SODIUM 3.38 G: 375; 3 INJECTION, SOLUTION INTRAVENOUS at 10:50

## 2021-01-01 RX ADMIN — BISACODYL 10 MG: 10 SUPPOSITORY RECTAL at 08:25

## 2021-01-01 RX ADMIN — TAMSULOSIN HYDROCHLORIDE 0.4 MG: 0.4 CAPSULE ORAL at 21:29

## 2021-01-01 RX ADMIN — AMLODIPINE BESYLATE 5 MG: 5 TABLET ORAL at 09:13

## 2021-01-01 RX ADMIN — ATORVASTATIN CALCIUM 10 MG: 10 TABLET, FILM COATED ORAL at 16:41

## 2021-01-01 RX ADMIN — HYDROMORPHONE HYDROCHLORIDE 0.5 MG: 1 INJECTION, SOLUTION INTRAMUSCULAR; INTRAVENOUS; SUBCUTANEOUS at 21:57

## 2021-01-01 RX ADMIN — Medication 5 MG: at 20:53

## 2021-01-01 RX ADMIN — DOCUSATE SODIUM 50MG AND SENNOSIDES 8.6MG 2 TABLET: 8.6; 5 TABLET, FILM COATED ORAL at 08:31

## 2021-01-01 RX ADMIN — HYDRALAZINE HYDROCHLORIDE 10 MG: 10 TABLET, FILM COATED ORAL at 05:39

## 2021-01-01 RX ADMIN — POLYETHYLENE GLYCOL 3350 17 G: 17 POWDER, FOR SOLUTION ORAL at 11:10

## 2021-01-01 RX ADMIN — PANTOPRAZOLE SODIUM 40 MG: 40 TABLET, DELAYED RELEASE ORAL at 08:27

## 2021-01-01 RX ADMIN — IPRATROPIUM BROMIDE AND ALBUTEROL SULFATE 3 ML: .5; 3 SOLUTION RESPIRATORY (INHALATION) at 19:02

## 2021-01-01 RX ADMIN — PANTOPRAZOLE SODIUM 40 MG: 40 TABLET, DELAYED RELEASE ORAL at 08:26

## 2021-01-01 RX ADMIN — ASPIRIN 81 MG CHEWABLE TABLET 81 MG: 81 TABLET CHEWABLE at 08:41

## 2021-01-01 RX ADMIN — FINASTERIDE 5 MG: 5 TABLET, FILM COATED ORAL at 08:08

## 2021-01-01 RX ADMIN — ATORVASTATIN CALCIUM 10 MG: 10 TABLET, FILM COATED ORAL at 17:07

## 2021-01-01 RX ADMIN — Medication 150 MG: at 08:22

## 2021-01-01 RX ADMIN — PANTOPRAZOLE SODIUM 40 MG: 40 TABLET, DELAYED RELEASE ORAL at 08:17

## 2021-01-01 RX ADMIN — ESCITALOPRAM OXALATE 10 MG: 10 TABLET ORAL at 08:20

## 2021-01-01 RX ADMIN — DILTIAZEM HYDROCHLORIDE 240 MG: 240 CAPSULE, COATED, EXTENDED RELEASE ORAL at 08:51

## 2021-01-01 RX ADMIN — APIXABAN 5 MG: 5 TABLET, FILM COATED ORAL at 21:59

## 2021-01-01 RX ADMIN — CARBOXYMETHYLCELLULOSE SODIUM 1 DROP: 5 SOLUTION/ DROPS OPHTHALMIC at 12:41

## 2021-01-01 RX ADMIN — POLYETHYLENE GLYCOL 3350 17 G: 17 POWDER, FOR SOLUTION ORAL at 08:39

## 2021-01-01 RX ADMIN — ESCITALOPRAM OXALATE 20 MG: 20 TABLET ORAL at 08:03

## 2021-01-01 RX ADMIN — ARFORMOTEROL TARTRATE 15 MCG: 15 SOLUTION RESPIRATORY (INHALATION) at 18:30

## 2021-01-01 RX ADMIN — MUPIROCIN: 20 OINTMENT TOPICAL at 21:00

## 2021-01-01 RX ADMIN — HYDROMORPHONE HYDROCHLORIDE 0.5 MG: 1 INJECTION, SOLUTION INTRAMUSCULAR; INTRAVENOUS; SUBCUTANEOUS at 08:22

## 2021-01-01 RX ADMIN — HYDROCODONE BITARTRATE AND ACETAMINOPHEN 1 TABLET: 5; 325 TABLET ORAL at 16:48

## 2021-01-01 RX ADMIN — PANTOPRAZOLE SODIUM 40 MG: 40 TABLET, DELAYED RELEASE ORAL at 08:25

## 2021-01-01 RX ADMIN — CARBOXYMETHYLCELLULOSE SODIUM 1 DROP: 5 SOLUTION/ DROPS OPHTHALMIC at 21:00

## 2021-01-01 RX ADMIN — SODIUM CHLORIDE, PRESERVATIVE FREE 10 ML: 5 INJECTION INTRAVENOUS at 08:04

## 2021-01-01 RX ADMIN — HYDROMORPHONE HYDROCHLORIDE 0.5 MG: 1 INJECTION, SOLUTION INTRAMUSCULAR; INTRAVENOUS; SUBCUTANEOUS at 02:21

## 2021-01-01 RX ADMIN — HYDROCODONE BITARTRATE AND ACETAMINOPHEN 1 TABLET: 5; 325 TABLET ORAL at 20:40

## 2021-01-01 RX ADMIN — CLOPIDOGREL BISULFATE 75 MG: 75 TABLET ORAL at 09:14

## 2021-01-01 RX ADMIN — IPRATROPIUM BROMIDE AND ALBUTEROL SULFATE 3 ML: .5; 3 SOLUTION RESPIRATORY (INHALATION) at 00:21

## 2021-01-01 RX ADMIN — STANDARDIZED SENNA CONCENTRATE AND DOCUSATE SODIUM 1 TABLET: 8.6; 5 TABLET, FILM COATED ORAL at 20:54

## 2021-01-01 RX ADMIN — TAMSULOSIN HYDROCHLORIDE 0.4 MG: 0.4 CAPSULE ORAL at 21:22

## 2021-01-01 RX ADMIN — ESCITALOPRAM OXALATE 10 MG: 10 TABLET ORAL at 08:02

## 2021-01-01 RX ADMIN — BUDESONIDE AND FORMOTEROL FUMARATE DIHYDRATE 2 PUFF: 80; 4.5 AEROSOL RESPIRATORY (INHALATION) at 06:51

## 2021-01-01 RX ADMIN — VANCOMYCIN HYDROCHLORIDE 500 MG: 500 INJECTION, POWDER, LYOPHILIZED, FOR SOLUTION INTRAVENOUS at 10:25

## 2021-01-01 RX ADMIN — IPRATROPIUM BROMIDE AND ALBUTEROL SULFATE 3 ML: .5; 3 SOLUTION RESPIRATORY (INHALATION) at 23:46

## 2021-01-01 RX ADMIN — HYDROCODONE BITARTRATE AND ACETAMINOPHEN 1 TABLET: 5; 325 TABLET ORAL at 20:54

## 2021-01-01 RX ADMIN — MUPIROCIN: 20 OINTMENT TOPICAL at 20:40

## 2021-01-01 RX ADMIN — TRAZODONE HYDROCHLORIDE 50 MG: 50 TABLET ORAL at 16:06

## 2021-01-01 RX ADMIN — FUROSEMIDE 40 MG: 40 TABLET ORAL at 08:52

## 2021-01-01 RX ADMIN — SODIUM CHLORIDE 75 ML/HR: 4.5 INJECTION, SOLUTION INTRAVENOUS at 20:45

## 2021-01-01 RX ADMIN — Medication 1 CAPSULE: at 20:40

## 2021-01-01 RX ADMIN — BUDESONIDE 0.5 MG: 0.5 INHALANT RESPIRATORY (INHALATION) at 19:02

## 2021-01-01 RX ADMIN — HYDROMORPHONE HYDROCHLORIDE 0.5 MG: 1 INJECTION, SOLUTION INTRAMUSCULAR; INTRAVENOUS; SUBCUTANEOUS at 18:30

## 2021-01-01 RX ADMIN — FINASTERIDE 5 MG: 5 TABLET, FILM COATED ORAL at 09:03

## 2021-01-01 RX ADMIN — TAZOBACTAM SODIUM AND PIPERACILLIN SODIUM 3.38 G: 375; 3 INJECTION, SOLUTION INTRAVENOUS at 02:46

## 2021-01-01 RX ADMIN — IPRATROPIUM BROMIDE AND ALBUTEROL SULFATE 3 ML: .5; 3 SOLUTION RESPIRATORY (INHALATION) at 19:38

## 2021-01-01 RX ADMIN — BISOPROLOL FUMARATE 10 MG: 5 TABLET ORAL at 09:04

## 2021-01-01 RX ADMIN — MUPIROCIN: 20 OINTMENT TOPICAL at 21:50

## 2021-01-01 RX ADMIN — Medication 1 CAPSULE: at 20:54

## 2021-01-01 RX ADMIN — IPRATROPIUM BROMIDE AND ALBUTEROL SULFATE 3 ML: .5; 3 SOLUTION RESPIRATORY (INHALATION) at 13:08

## 2021-01-01 RX ADMIN — PANTOPRAZOLE SODIUM 40 MG: 40 TABLET, DELAYED RELEASE ORAL at 08:15

## 2021-01-01 RX ADMIN — STANDARDIZED SENNA CONCENTRATE AND DOCUSATE SODIUM 1 TABLET: 8.6; 5 TABLET, FILM COATED ORAL at 21:42

## 2021-01-01 RX ADMIN — BISACODYL 5 MG: 5 TABLET, COATED ORAL at 16:48

## 2021-01-01 RX ADMIN — SODIUM CHLORIDE, PRESERVATIVE FREE 10 ML: 5 INJECTION INTRAVENOUS at 21:23

## 2021-01-01 RX ADMIN — IPRATROPIUM BROMIDE AND ALBUTEROL SULFATE 3 ML: .5; 3 SOLUTION RESPIRATORY (INHALATION) at 06:54

## 2021-01-01 RX ADMIN — Medication 150 MG: at 10:27

## 2021-01-01 RX ADMIN — MUPIROCIN: 20 OINTMENT TOPICAL at 09:02

## 2021-01-01 RX ADMIN — IPRATROPIUM BROMIDE AND ALBUTEROL SULFATE 3 ML: .5; 3 SOLUTION RESPIRATORY (INHALATION) at 13:01

## 2021-01-01 RX ADMIN — OXYBUTYNIN CHLORIDE 5 MG: 5 TABLET ORAL at 21:14

## 2021-01-01 RX ADMIN — HYDRALAZINE HYDROCHLORIDE 10 MG: 10 TABLET, FILM COATED ORAL at 15:00

## 2021-01-01 RX ADMIN — IPRATROPIUM BROMIDE AND ALBUTEROL SULFATE 3 ML: .5; 3 SOLUTION RESPIRATORY (INHALATION) at 06:59

## 2021-01-01 RX ADMIN — CLOPIDOGREL BISULFATE 75 MG: 75 TABLET ORAL at 08:45

## 2021-01-01 RX ADMIN — LIDOCAINE HYDROCHLORIDE,EPINEPHRINE BITARTRATE 10 ML: 10; .01 INJECTION, SOLUTION INFILTRATION; PERINEURAL at 21:45

## 2021-01-01 RX ADMIN — PANTOPRAZOLE SODIUM 40 MG: 40 TABLET, DELAYED RELEASE ORAL at 08:32

## 2021-01-01 RX ADMIN — PANTOPRAZOLE SODIUM 40 MG: 40 TABLET, DELAYED RELEASE ORAL at 08:07

## 2021-01-01 RX ADMIN — HEPARIN SODIUM 5000 UNITS: 5000 INJECTION INTRAVENOUS; SUBCUTANEOUS at 14:22

## 2021-01-01 RX ADMIN — DOCUSATE SODIUM 50MG AND SENNOSIDES 8.6MG 2 TABLET: 8.6; 5 TABLET, FILM COATED ORAL at 08:40

## 2021-01-01 RX ADMIN — METHYLPREDNISOLONE SODIUM SUCCINATE 40 MG: 40 INJECTION, POWDER, FOR SOLUTION INTRAMUSCULAR; INTRAVENOUS at 21:14

## 2021-01-01 RX ADMIN — BUDESONIDE AND FORMOTEROL FUMARATE DIHYDRATE 2 PUFF: 80; 4.5 AEROSOL RESPIRATORY (INHALATION) at 07:09

## 2021-01-01 RX ADMIN — HYDRALAZINE HYDROCHLORIDE 10 MG: 10 TABLET, FILM COATED ORAL at 21:59

## 2021-01-01 RX ADMIN — BUDESONIDE 0.5 MG: 0.5 INHALANT RESPIRATORY (INHALATION) at 06:54

## 2021-01-01 RX ADMIN — Medication 1 CAPSULE: at 21:01

## 2021-01-01 RX ADMIN — ALPRAZOLAM 0.25 MG: 0.25 TABLET ORAL at 05:20

## 2021-01-01 RX ADMIN — STANDARDIZED SENNA CONCENTRATE AND DOCUSATE SODIUM 1 TABLET: 8.6; 5 TABLET, FILM COATED ORAL at 08:45

## 2021-01-01 RX ADMIN — CARBOXYMETHYLCELLULOSE SODIUM 1 DROP: 5 SOLUTION/ DROPS OPHTHALMIC at 08:14

## 2021-01-01 RX ADMIN — HYDRALAZINE HYDROCHLORIDE 10 MG: 10 TABLET, FILM COATED ORAL at 21:00

## 2021-01-01 RX ADMIN — Medication 1 CAPSULE: at 08:07

## 2021-01-01 RX ADMIN — ASPIRIN 81 MG CHEWABLE TABLET 81 MG: 81 TABLET CHEWABLE at 08:15

## 2021-01-01 RX ADMIN — ONDANSETRON 4 MG: 2 INJECTION INTRAMUSCULAR; INTRAVENOUS at 17:11

## 2021-01-01 RX ADMIN — APIXABAN 5 MG: 5 TABLET, FILM COATED ORAL at 08:46

## 2021-01-01 RX ADMIN — FINASTERIDE 5 MG: 5 TABLET, FILM COATED ORAL at 08:22

## 2021-01-01 RX ADMIN — CARBOXYMETHYLCELLULOSE SODIUM 1 DROP: 5 SOLUTION/ DROPS OPHTHALMIC at 16:55

## 2021-01-01 RX ADMIN — SODIUM CHLORIDE, PRESERVATIVE FREE 10 ML: 5 INJECTION INTRAVENOUS at 08:52

## 2021-01-01 RX ADMIN — IPRATROPIUM BROMIDE AND ALBUTEROL SULFATE 3 ML: .5; 3 SOLUTION RESPIRATORY (INHALATION) at 18:29

## 2021-01-01 RX ADMIN — ACETAMINOPHEN 650 MG: 325 TABLET ORAL at 11:29

## 2021-01-01 RX ADMIN — HYDROCODONE BITARTRATE AND ACETAMINOPHEN 1 TABLET: 5; 325 TABLET ORAL at 02:31

## 2021-01-01 RX ADMIN — TAMSULOSIN HYDROCHLORIDE 0.4 MG: 0.4 CAPSULE ORAL at 21:59

## 2021-01-01 RX ADMIN — HYDROMORPHONE HYDROCHLORIDE 0.5 MG: 1 INJECTION, SOLUTION INTRAMUSCULAR; INTRAVENOUS; SUBCUTANEOUS at 23:07

## 2021-01-01 RX ADMIN — HYDRALAZINE HYDROCHLORIDE 10 MG: 10 TABLET, FILM COATED ORAL at 01:00

## 2021-01-01 RX ADMIN — HYDROMORPHONE HYDROCHLORIDE 0.5 MG: 1 INJECTION, SOLUTION INTRAMUSCULAR; INTRAVENOUS; SUBCUTANEOUS at 00:00

## 2021-01-01 RX ADMIN — TRAZODONE HYDROCHLORIDE 50 MG: 50 TABLET ORAL at 20:53

## 2021-01-01 RX ADMIN — BUDESONIDE AND FORMOTEROL FUMARATE DIHYDRATE 2 PUFF: 80; 4.5 AEROSOL RESPIRATORY (INHALATION) at 06:54

## 2021-01-01 RX ADMIN — ALPRAZOLAM 0.25 MG: 0.25 TABLET ORAL at 21:28

## 2021-01-01 RX ADMIN — CLOPIDOGREL BISULFATE 75 MG: 75 TABLET ORAL at 08:52

## 2021-01-01 RX ADMIN — MIRTAZAPINE 15 MG: 15 TABLET, FILM COATED ORAL at 20:46

## 2021-01-01 RX ADMIN — SODIUM CHLORIDE, PRESERVATIVE FREE 10 ML: 5 INJECTION INTRAVENOUS at 00:00

## 2021-01-01 RX ADMIN — HYDRALAZINE HYDROCHLORIDE 10 MG: 10 TABLET, FILM COATED ORAL at 21:50

## 2021-01-01 RX ADMIN — POTASSIUM CHLORIDE 10 MEQ: 10 CAPSULE, COATED, EXTENDED RELEASE ORAL at 08:03

## 2021-01-01 RX ADMIN — CARBOXYMETHYLCELLULOSE SODIUM 1 DROP: 5 SOLUTION/ DROPS OPHTHALMIC at 15:48

## 2021-01-01 RX ADMIN — CLOPIDOGREL BISULFATE 75 MG: 75 TABLET ORAL at 08:26

## 2021-01-01 RX ADMIN — TAZOBACTAM SODIUM AND PIPERACILLIN SODIUM 3.38 G: 375; 3 INJECTION, SOLUTION INTRAVENOUS at 02:07

## 2021-01-01 RX ADMIN — CARBOXYMETHYLCELLULOSE SODIUM 1 DROP: 5 SOLUTION/ DROPS OPHTHALMIC at 20:15

## 2021-01-01 RX ADMIN — SODIUM CHLORIDE, PRESERVATIVE FREE 10 ML: 5 INJECTION INTRAVENOUS at 15:31

## 2021-01-01 RX ADMIN — SODIUM CHLORIDE, PRESERVATIVE FREE 10 ML: 5 INJECTION INTRAVENOUS at 08:09

## 2021-01-01 RX ADMIN — ISOSORBIDE DINITRATE 10 MG: 10 TABLET ORAL at 08:18

## 2021-01-01 RX ADMIN — ATORVASTATIN CALCIUM 10 MG: 10 TABLET, FILM COATED ORAL at 17:09

## 2021-01-01 RX ADMIN — ASPIRIN 81 MG: 81 TABLET, CHEWABLE ORAL at 08:03

## 2021-01-01 RX ADMIN — SODIUM CHLORIDE, PRESERVATIVE FREE 10 ML: 5 INJECTION INTRAVENOUS at 21:01

## 2021-01-01 RX ADMIN — FINASTERIDE 5 MG: 5 TABLET, FILM COATED ORAL at 14:22

## 2021-01-01 RX ADMIN — FUROSEMIDE 80 MG: 10 INJECTION, SOLUTION INTRAMUSCULAR; INTRAVENOUS at 22:06

## 2021-01-01 RX ADMIN — ONDANSETRON 4 MG: 2 INJECTION, SOLUTION INTRAMUSCULAR; INTRAVENOUS at 19:50

## 2021-01-01 RX ADMIN — CARBOXYMETHYLCELLULOSE SODIUM 1 DROP: 5 SOLUTION/ DROPS OPHTHALMIC at 16:42

## 2021-01-01 RX ADMIN — ASPIRIN 81 MG: 81 TABLET, CHEWABLE ORAL at 08:38

## 2021-01-01 RX ADMIN — FERROUS SULFATE TAB EC 324 MG (65 MG FE EQUIVALENT) 324 MG: 324 (65 FE) TABLET DELAYED RESPONSE at 08:32

## 2021-01-01 RX ADMIN — DOCUSATE SODIUM 50MG AND SENNOSIDES 8.6MG 2 TABLET: 8.6; 5 TABLET, FILM COATED ORAL at 08:08

## 2021-01-01 RX ADMIN — HYDROMORPHONE HYDROCHLORIDE 0.5 MG: 1 INJECTION, SOLUTION INTRAMUSCULAR; INTRAVENOUS; SUBCUTANEOUS at 14:23

## 2021-01-01 RX ADMIN — ASPIRIN 81 MG CHEWABLE TABLET 81 MG: 81 TABLET CHEWABLE at 08:45

## 2021-01-01 RX ADMIN — SODIUM CHLORIDE, PRESERVATIVE FREE 10 ML: 5 INJECTION INTRAVENOUS at 08:32

## 2021-01-01 RX ADMIN — ATORVASTATIN CALCIUM 10 MG: 10 TABLET, FILM COATED ORAL at 16:06

## 2021-01-01 RX ADMIN — VANCOMYCIN HYDROCHLORIDE 1500 MG: 5 INJECTION, POWDER, LYOPHILIZED, FOR SOLUTION INTRAVENOUS at 19:38

## 2021-01-01 RX ADMIN — MIRTAZAPINE 15 MG: 15 TABLET, FILM COATED ORAL at 20:25

## 2021-01-01 RX ADMIN — ASPIRIN 81 MG CHEWABLE TABLET 81 MG: 81 TABLET CHEWABLE at 08:18

## 2021-01-01 RX ADMIN — HYDRALAZINE HYDROCHLORIDE 10 MG: 10 TABLET, FILM COATED ORAL at 05:13

## 2021-01-01 RX ADMIN — ACETAMINOPHEN 650 MG: 325 TABLET ORAL at 05:40

## 2021-01-01 RX ADMIN — IPRATROPIUM BROMIDE AND ALBUTEROL SULFATE 3 ML: .5; 3 SOLUTION RESPIRATORY (INHALATION) at 19:44

## 2021-01-01 RX ADMIN — IPRATROPIUM BROMIDE AND ALBUTEROL SULFATE 3 ML: .5; 3 SOLUTION RESPIRATORY (INHALATION) at 13:29

## 2021-01-01 RX ADMIN — CLOPIDOGREL BISULFATE 75 MG: 75 TABLET ORAL at 09:04

## 2021-01-01 RX ADMIN — FAMOTIDINE 40 MG: 20 TABLET ORAL at 09:10

## 2021-01-01 RX ADMIN — CARBOXYMETHYLCELLULOSE SODIUM 1 DROP: 5 SOLUTION/ DROPS OPHTHALMIC at 16:34

## 2021-01-01 RX ADMIN — CEFTRIAXONE SODIUM 1 G: 1 INJECTION, POWDER, FOR SOLUTION INTRAMUSCULAR; INTRAVENOUS at 13:43

## 2021-01-01 RX ADMIN — HEPARIN SODIUM 5000 UNITS: 5000 INJECTION INTRAVENOUS; SUBCUTANEOUS at 05:20

## 2021-01-01 RX ADMIN — Medication 1 CAPSULE: at 20:05

## 2021-01-01 RX ADMIN — OXYBUTYNIN CHLORIDE 5 MG: 5 TABLET ORAL at 20:25

## 2021-01-01 RX ADMIN — SODIUM CHLORIDE, PRESERVATIVE FREE 10 ML: 5 INJECTION INTRAVENOUS at 20:25

## 2021-01-01 RX ADMIN — MUPIROCIN: 20 OINTMENT TOPICAL at 08:24

## 2021-01-01 RX ADMIN — MUPIROCIN: 20 OINTMENT TOPICAL at 20:55

## 2021-01-01 RX ADMIN — Medication 5 MG: at 21:14

## 2021-01-01 RX ADMIN — OXYBUTYNIN CHLORIDE 5 MG: 5 TABLET ORAL at 21:10

## 2021-01-01 RX ADMIN — TAMSULOSIN HYDROCHLORIDE 0.4 MG: 0.4 CAPSULE ORAL at 20:54

## 2021-01-01 RX ADMIN — Medication 5 MG: at 20:25

## 2021-01-01 RX ADMIN — IPRATROPIUM BROMIDE AND ALBUTEROL SULFATE 3 ML: .5; 3 SOLUTION RESPIRATORY (INHALATION) at 13:25

## 2021-01-01 RX ADMIN — AZITHROMYCIN 500 MG: 500 INJECTION, POWDER, LYOPHILIZED, FOR SOLUTION INTRAVENOUS at 17:29

## 2021-01-01 RX ADMIN — Medication 1 CAPSULE: at 09:03

## 2021-01-01 RX ADMIN — FAMOTIDINE 40 MG: 20 TABLET ORAL at 08:20

## 2021-01-01 RX ADMIN — ATORVASTATIN CALCIUM 10 MG: 10 TABLET, FILM COATED ORAL at 20:36

## 2021-01-01 RX ADMIN — SODIUM CHLORIDE, PRESERVATIVE FREE 3 ML: 5 INJECTION INTRAVENOUS at 20:28

## 2021-01-01 RX ADMIN — HYDROMORPHONE HYDROCHLORIDE 0.5 MG: 1 INJECTION, SOLUTION INTRAMUSCULAR; INTRAVENOUS; SUBCUTANEOUS at 16:47

## 2021-01-01 RX ADMIN — IPRATROPIUM BROMIDE AND ALBUTEROL SULFATE 3 ML: .5; 3 SOLUTION RESPIRATORY (INHALATION) at 07:06

## 2021-01-01 RX ADMIN — PREDNISONE 10 MG: 10 TABLET ORAL at 08:21

## 2021-01-01 RX ADMIN — HYDRALAZINE HYDROCHLORIDE 10 MG: 10 TABLET, FILM COATED ORAL at 14:31

## 2021-01-01 RX ADMIN — POLYETHYLENE GLYCOL 3350 17 G: 17 POWDER, FOR SOLUTION ORAL at 08:38

## 2021-01-01 RX ADMIN — Medication 1 CAPSULE: at 08:15

## 2021-01-01 RX ADMIN — Medication 1 CAPSULE: at 08:26

## 2021-01-01 RX ADMIN — ATORVASTATIN CALCIUM 10 MG: 10 TABLET, FILM COATED ORAL at 17:42

## 2021-01-01 RX ADMIN — POLYETHYLENE GLYCOL 3350 17 G: 17 POWDER, FOR SOLUTION ORAL at 20:36

## 2021-01-01 RX ADMIN — ALPRAZOLAM 0.25 MG: 0.25 TABLET ORAL at 12:49

## 2021-01-01 RX ADMIN — FERROUS SULFATE TAB EC 324 MG (65 MG FE EQUIVALENT) 324 MG: 324 (65 FE) TABLET DELAYED RESPONSE at 08:03

## 2021-01-01 RX ADMIN — AMLODIPINE BESYLATE 5 MG: 5 TABLET ORAL at 08:02

## 2021-01-01 RX ADMIN — AMLODIPINE BESYLATE 5 MG: 5 TABLET ORAL at 08:20

## 2021-01-01 RX ADMIN — SODIUM CHLORIDE, PRESERVATIVE FREE 3 ML: 5 INJECTION INTRAVENOUS at 08:38

## 2021-01-01 RX ADMIN — TAZOBACTAM SODIUM AND PIPERACILLIN SODIUM 3.38 G: 375; 3 INJECTION, SOLUTION INTRAVENOUS at 11:05

## 2021-01-01 RX ADMIN — APIXABAN 5 MG: 5 TABLET, FILM COATED ORAL at 09:03

## 2021-01-01 RX ADMIN — HYDRALAZINE HYDROCHLORIDE 10 MG: 10 TABLET, FILM COATED ORAL at 06:15

## 2021-01-01 RX ADMIN — IPRATROPIUM BROMIDE AND ALBUTEROL SULFATE 3 ML: .5; 3 SOLUTION RESPIRATORY (INHALATION) at 12:41

## 2021-01-01 RX ADMIN — Medication 1 CAPSULE: at 08:28

## 2021-01-01 RX ADMIN — ISOSORBIDE DINITRATE 10 MG: 10 TABLET ORAL at 08:15

## 2021-01-01 RX ADMIN — ALPRAZOLAM 0.25 MG: 0.25 TABLET ORAL at 23:54

## 2021-01-01 RX ADMIN — ALPRAZOLAM 0.25 MG: 0.25 TABLET ORAL at 21:52

## 2021-01-01 RX ADMIN — CARBOXYMETHYLCELLULOSE SODIUM 1 DROP: 5 SOLUTION/ DROPS OPHTHALMIC at 08:03

## 2021-01-01 RX ADMIN — ESCITALOPRAM OXALATE 20 MG: 20 TABLET ORAL at 08:21

## 2021-01-01 RX ADMIN — SODIUM CHLORIDE, PRESERVATIVE FREE 10 ML: 5 INJECTION INTRAVENOUS at 09:03

## 2021-01-01 RX ADMIN — CLOPIDOGREL BISULFATE 75 MG: 75 TABLET ORAL at 08:07

## 2021-01-01 RX ADMIN — IPRATROPIUM BROMIDE AND ALBUTEROL SULFATE 3 ML: .5; 3 SOLUTION RESPIRATORY (INHALATION) at 12:11

## 2021-01-01 RX ADMIN — IPRATROPIUM BROMIDE AND ALBUTEROL SULFATE 3 ML: .5; 3 SOLUTION RESPIRATORY (INHALATION) at 23:16

## 2021-01-01 RX ADMIN — HYDROCODONE BITARTRATE AND ACETAMINOPHEN 1 TABLET: 5; 325 TABLET ORAL at 02:25

## 2021-01-01 RX ADMIN — Medication 150 MG: at 08:45

## 2021-01-01 RX ADMIN — SODIUM CHLORIDE 500 ML: 9 INJECTION, SOLUTION INTRAVENOUS at 10:08

## 2021-01-01 RX ADMIN — ALPRAZOLAM 0.25 MG: 0.25 TABLET ORAL at 02:04

## 2021-01-01 RX ADMIN — ISOSORBIDE DINITRATE 10 MG: 10 TABLET ORAL at 17:42

## 2021-01-01 RX ADMIN — BUMETANIDE 4 MG: 0.25 INJECTION INTRAMUSCULAR; INTRAVENOUS at 10:17

## 2021-01-01 RX ADMIN — HYDROCODONE BITARTRATE AND ACETAMINOPHEN 1 TABLET: 5; 325 TABLET ORAL at 16:06

## 2021-01-01 RX ADMIN — HEPARIN SODIUM 2250 UNITS: 1000 INJECTION INTRAVENOUS; SUBCUTANEOUS at 16:49

## 2021-01-01 RX ADMIN — ISOSORBIDE DINITRATE 10 MG: 10 TABLET ORAL at 12:25

## 2021-01-01 RX ADMIN — FINASTERIDE 5 MG: 5 TABLET, FILM COATED ORAL at 08:38

## 2021-01-01 RX ADMIN — VANCOMYCIN HYDROCHLORIDE 750 MG: 750 INJECTION, SOLUTION INTRAVENOUS at 08:49

## 2021-01-01 RX ADMIN — TRAZODONE HYDROCHLORIDE 50 MG: 50 TABLET ORAL at 21:13

## 2021-01-01 RX ADMIN — TAMSULOSIN HYDROCHLORIDE 0.4 MG: 0.4 CAPSULE ORAL at 21:50

## 2021-01-01 RX ADMIN — ISOSORBIDE DINITRATE 10 MG: 10 TABLET ORAL at 09:00

## 2021-01-01 RX ADMIN — SODIUM CHLORIDE, PRESERVATIVE FREE 10 ML: 5 INJECTION INTRAVENOUS at 19:40

## 2021-01-01 RX ADMIN — PANTOPRAZOLE SODIUM 40 MG: 40 TABLET, DELAYED RELEASE ORAL at 06:30

## 2021-01-01 RX ADMIN — SODIUM CHLORIDE, PRESERVATIVE FREE 3 ML: 5 INJECTION INTRAVENOUS at 09:59

## 2021-01-01 RX ADMIN — IPRATROPIUM BROMIDE AND ALBUTEROL SULFATE 3 ML: .5; 3 SOLUTION RESPIRATORY (INHALATION) at 00:07

## 2021-01-01 RX ADMIN — IPRATROPIUM BROMIDE AND ALBUTEROL SULFATE 3 ML: .5; 3 SOLUTION RESPIRATORY (INHALATION) at 07:18

## 2021-01-01 RX ADMIN — DOCUSATE SODIUM 50MG AND SENNOSIDES 8.6MG 2 TABLET: 8.6; 5 TABLET, FILM COATED ORAL at 08:03

## 2021-01-01 RX ADMIN — ACETAMINOPHEN 650 MG: 325 TABLET ORAL at 01:44

## 2021-01-01 RX ADMIN — OXYBUTYNIN CHLORIDE 5 MG: 5 TABLET ORAL at 21:03

## 2021-01-01 RX ADMIN — PREDNISONE 10 MG: 10 TABLET ORAL at 08:09

## 2021-01-01 RX ADMIN — CLOPIDOGREL BISULFATE 75 MG: 75 TABLET ORAL at 08:31

## 2021-01-01 RX ADMIN — ESCITALOPRAM OXALATE 10 MG: 10 TABLET ORAL at 08:07

## 2021-01-01 RX ADMIN — IOPAMIDOL 100 ML: 612 INJECTION, SOLUTION INTRAVENOUS at 09:53

## 2021-01-01 RX ADMIN — TORSEMIDE 40 MG: 20 TABLET ORAL at 08:14

## 2021-01-01 RX ADMIN — ENOXAPARIN SODIUM 40 MG: 40 INJECTION SUBCUTANEOUS at 17:12

## 2021-01-01 RX ADMIN — BISACODYL 10 MG: 10 SUPPOSITORY RECTAL at 08:02

## 2021-01-01 RX ADMIN — SODIUM CHLORIDE, PRESERVATIVE FREE 10 ML: 5 INJECTION INTRAVENOUS at 08:24

## 2021-01-01 RX ADMIN — BUDESONIDE AND FORMOTEROL FUMARATE DIHYDRATE 2 PUFF: 80; 4.5 AEROSOL RESPIRATORY (INHALATION) at 20:17

## 2021-01-01 RX ADMIN — ACETAMINOPHEN 650 MG: 325 TABLET ORAL at 00:27

## 2021-01-01 RX ADMIN — ONDANSETRON 4 MG: 2 INJECTION INTRAMUSCULAR; INTRAVENOUS at 09:01

## 2021-01-01 RX ADMIN — ACETAMINOPHEN 650 MG: 325 TABLET ORAL at 21:18

## 2021-01-01 RX ADMIN — POLYETHYLENE GLYCOL 3350 17 G: 17 POWDER, FOR SOLUTION ORAL at 08:24

## 2021-01-01 RX ADMIN — APIXABAN 5 MG: 5 TABLET, FILM COATED ORAL at 08:22

## 2021-01-01 RX ADMIN — AZITHROMYCIN 500 MG: 500 INJECTION, POWDER, LYOPHILIZED, FOR SOLUTION INTRAVENOUS at 16:47

## 2021-01-01 RX ADMIN — HYDROMORPHONE HYDROCHLORIDE 0.25 MG: 1 INJECTION, SOLUTION INTRAMUSCULAR; INTRAVENOUS; SUBCUTANEOUS at 10:32

## 2021-01-01 RX ADMIN — HYDRALAZINE HYDROCHLORIDE 10 MG: 10 TABLET, FILM COATED ORAL at 21:22

## 2021-01-01 RX ADMIN — SODIUM CHLORIDE, PRESERVATIVE FREE 10 ML: 5 INJECTION INTRAVENOUS at 20:07

## 2021-01-01 RX ADMIN — SODIUM CHLORIDE, PRESERVATIVE FREE 10 ML: 5 INJECTION INTRAVENOUS at 21:00

## 2021-01-01 RX ADMIN — PANTOPRAZOLE SODIUM 40 MG: 40 TABLET, DELAYED RELEASE ORAL at 08:04

## 2021-01-01 RX ADMIN — SODIUM CHLORIDE, PRESERVATIVE FREE 10 ML: 5 INJECTION INTRAVENOUS at 08:21

## 2021-01-01 RX ADMIN — ALPRAZOLAM 0.25 MG: 0.25 TABLET ORAL at 21:23

## 2021-01-01 RX ADMIN — TAMSULOSIN HYDROCHLORIDE 0.4 MG: 0.4 CAPSULE ORAL at 20:36

## 2021-01-01 RX ADMIN — POLYETHYLENE GLYCOL 3350 17 G: 17 POWDER, FOR SOLUTION ORAL at 08:26

## 2021-01-01 RX ADMIN — HYDRALAZINE HYDROCHLORIDE 10 MG: 10 TABLET, FILM COATED ORAL at 12:49

## 2021-01-01 RX ADMIN — IPRATROPIUM BROMIDE AND ALBUTEROL SULFATE 3 ML: .5; 3 SOLUTION RESPIRATORY (INHALATION) at 06:29

## 2021-01-01 RX ADMIN — ASPIRIN 81 MG CHEWABLE TABLET 81 MG: 81 TABLET CHEWABLE at 08:08

## 2021-01-01 RX ADMIN — SODIUM CHLORIDE 100 ML/HR: 9 INJECTION, SOLUTION INTRAVENOUS at 02:04

## 2021-01-01 RX ADMIN — BISACODYL 10 MG: 10 SUPPOSITORY RECTAL at 09:03

## 2021-01-01 RX ADMIN — ACETAMINOPHEN 650 MG: 650 SOLUTION ORAL at 21:13

## 2021-01-01 RX ADMIN — CLOPIDOGREL BISULFATE 75 MG: 75 TABLET ORAL at 08:15

## 2021-01-01 RX ADMIN — DILTIAZEM HYDROCHLORIDE 240 MG: 240 CAPSULE, COATED, EXTENDED RELEASE ORAL at 08:32

## 2021-01-01 RX ADMIN — APIXABAN 5 MG: 5 TABLET, FILM COATED ORAL at 08:15

## 2021-01-01 RX ADMIN — DOCUSATE SODIUM 50MG AND SENNOSIDES 8.6MG 2 TABLET: 8.6; 5 TABLET, FILM COATED ORAL at 08:02

## 2021-01-01 RX ADMIN — HYDROMORPHONE HYDROCHLORIDE 0.5 MG: 1 INJECTION, SOLUTION INTRAMUSCULAR; INTRAVENOUS; SUBCUTANEOUS at 17:55

## 2021-01-01 RX ADMIN — HEPARIN SODIUM 5000 UNITS: 5000 INJECTION INTRAVENOUS; SUBCUTANEOUS at 05:58

## 2021-01-01 RX ADMIN — TRAZODONE HYDROCHLORIDE 50 MG: 50 TABLET ORAL at 21:10

## 2021-01-01 RX ADMIN — HYDROCODONE BITARTRATE AND ACETAMINOPHEN 1 TABLET: 5; 325 TABLET ORAL at 11:53

## 2021-01-01 RX ADMIN — ASPIRIN 81 MG CHEWABLE TABLET 81 MG: 81 TABLET CHEWABLE at 09:03

## 2021-01-01 RX ADMIN — CARBOXYMETHYLCELLULOSE SODIUM 1 DROP: 5 SOLUTION/ DROPS OPHTHALMIC at 21:01

## 2021-01-01 RX ADMIN — BISACODYL 10 MG: 10 SUPPOSITORY RECTAL at 08:17

## 2021-01-01 RX ADMIN — ISOSORBIDE DINITRATE 10 MG: 10 TABLET ORAL at 08:22

## 2021-01-01 RX ADMIN — DILTIAZEM HYDROCHLORIDE 240 MG: 240 CAPSULE, COATED, EXTENDED RELEASE ORAL at 08:08

## 2021-01-01 RX ADMIN — FUROSEMIDE 40 MG: 10 INJECTION, SOLUTION INTRAMUSCULAR; INTRAVENOUS at 02:36

## 2021-01-01 RX ADMIN — STANDARDIZED SENNA CONCENTRATE AND DOCUSATE SODIUM 1 TABLET: 8.6; 5 TABLET, FILM COATED ORAL at 21:22

## 2021-01-01 RX ADMIN — IPRATROPIUM BROMIDE AND ALBUTEROL SULFATE 3 ML: .5; 3 SOLUTION RESPIRATORY (INHALATION) at 00:23

## 2021-01-01 RX ADMIN — WHITE PETROLATUM, ZINC OXIDE: 66.2; 13.4 OINTMENT TOPICAL at 15:31

## 2021-01-01 RX ADMIN — MORPHINE SULFATE 2 MG: 2 INJECTION, SOLUTION INTRAMUSCULAR; INTRAVENOUS at 23:22

## 2021-01-01 RX ADMIN — BUDESONIDE AND FORMOTEROL FUMARATE DIHYDRATE 2 PUFF: 80; 4.5 AEROSOL RESPIRATORY (INHALATION) at 20:12

## 2021-01-01 RX ADMIN — ACETAMINOPHEN 650 MG: 325 TABLET ORAL at 21:28

## 2021-01-01 RX ADMIN — TAMSULOSIN HYDROCHLORIDE 0.4 MG: 0.4 CAPSULE ORAL at 21:10

## 2021-01-01 RX ADMIN — ASPIRIN 81 MG: 81 TABLET, CHEWABLE ORAL at 08:40

## 2021-01-01 RX ADMIN — AMLODIPINE BESYLATE 5 MG: 5 TABLET ORAL at 08:38

## 2021-01-01 RX ADMIN — STANDARDIZED SENNA CONCENTRATE AND DOCUSATE SODIUM 1 TABLET: 8.6; 5 TABLET, FILM COATED ORAL at 09:03

## 2021-01-01 RX ADMIN — CEFTRIAXONE 1 G: 1 INJECTION, SOLUTION INTRAVENOUS at 15:55

## 2021-01-01 RX ADMIN — BUDESONIDE AND FORMOTEROL FUMARATE DIHYDRATE 2 PUFF: 80; 4.5 AEROSOL RESPIRATORY (INHALATION) at 07:18

## 2021-01-01 RX ADMIN — BENZONATATE 200 MG: 100 CAPSULE ORAL at 20:46

## 2021-01-01 RX ADMIN — ISOSORBIDE DINITRATE 10 MG: 10 TABLET ORAL at 18:22

## 2021-01-01 RX ADMIN — IPRATROPIUM BROMIDE AND ALBUTEROL SULFATE 3 ML: .5; 3 SOLUTION RESPIRATORY (INHALATION) at 19:32

## 2021-01-01 RX ADMIN — ENOXAPARIN SODIUM 30 MG: 30 INJECTION SUBCUTANEOUS at 02:04

## 2021-01-01 RX ADMIN — CEFTRIAXONE 1 G: 1 INJECTION, SOLUTION INTRAVENOUS at 09:34

## 2021-01-01 RX ADMIN — DOCUSATE SODIUM 100 MG: 100 CAPSULE, LIQUID FILLED ORAL at 09:33

## 2021-01-01 RX ADMIN — AMLODIPINE BESYLATE 5 MG: 5 TABLET ORAL at 08:40

## 2021-01-01 RX ADMIN — IPRATROPIUM BROMIDE AND ALBUTEROL SULFATE 3 ML: .5; 3 SOLUTION RESPIRATORY (INHALATION) at 06:50

## 2021-01-01 RX ADMIN — IPRATROPIUM BROMIDE AND ALBUTEROL SULFATE 3 ML: .5; 3 SOLUTION RESPIRATORY (INHALATION) at 01:34

## 2021-01-01 RX ADMIN — HYDROCODONE BITARTRATE AND ACETAMINOPHEN 1 TABLET: 5; 325 TABLET ORAL at 13:34

## 2021-01-01 RX ADMIN — HYDROCODONE BITARTRATE AND ACETAMINOPHEN 1 TABLET: 5; 325 TABLET ORAL at 00:38

## 2021-01-01 RX ADMIN — METHYLPREDNISOLONE SODIUM SUCCINATE 80 MG: 40 INJECTION, POWDER, FOR SOLUTION INTRAMUSCULAR; INTRAVENOUS at 10:58

## 2021-01-01 RX ADMIN — BISOPROLOL FUMARATE 10 MG: 5 TABLET ORAL at 08:45

## 2021-01-01 RX ADMIN — Medication 5 MG: at 20:55

## 2021-01-01 RX ADMIN — FINASTERIDE 5 MG: 5 TABLET, FILM COATED ORAL at 08:07

## 2021-01-01 RX ADMIN — IPRATROPIUM BROMIDE AND ALBUTEROL SULFATE 3 ML: .5; 3 SOLUTION RESPIRATORY (INHALATION) at 00:00

## 2021-01-01 RX ADMIN — FUROSEMIDE 40 MG: 10 INJECTION, SOLUTION INTRAVENOUS at 21:14

## 2021-01-01 RX ADMIN — STANDARDIZED SENNA CONCENTRATE AND DOCUSATE SODIUM 1 TABLET: 8.6; 5 TABLET, FILM COATED ORAL at 08:26

## 2021-01-01 RX ADMIN — CARBOXYMETHYLCELLULOSE SODIUM 1 DROP: 5 SOLUTION/ DROPS OPHTHALMIC at 08:42

## 2021-01-01 RX ADMIN — HYDROCODONE BITARTRATE AND ACETAMINOPHEN 1 TABLET: 5; 325 TABLET ORAL at 19:52

## 2021-01-01 RX ADMIN — SODIUM CHLORIDE, PRESERVATIVE FREE 3 ML: 5 INJECTION INTRAVENOUS at 08:06

## 2021-01-01 RX ADMIN — FINASTERIDE 5 MG: 5 TABLET, FILM COATED ORAL at 08:45

## 2021-01-01 RX ADMIN — HYDROCODONE BITARTRATE AND ACETAMINOPHEN 1 TABLET: 5; 325 TABLET ORAL at 12:24

## 2021-01-01 RX ADMIN — HEPARIN SODIUM 5000 UNITS: 5000 INJECTION INTRAVENOUS; SUBCUTANEOUS at 14:23

## 2021-01-01 RX ADMIN — BISOPROLOL FUMARATE 10 MG: 5 TABLET ORAL at 15:15

## 2021-01-01 RX ADMIN — IPRATROPIUM BROMIDE AND ALBUTEROL SULFATE 3 ML: .5; 3 SOLUTION RESPIRATORY (INHALATION) at 01:43

## 2021-01-01 RX ADMIN — HYDRALAZINE HYDROCHLORIDE 10 MG: 10 TABLET, FILM COATED ORAL at 05:58

## 2021-01-01 RX ADMIN — FERROUS SULFATE TAB EC 324 MG (65 MG FE EQUIVALENT) 324 MG: 324 (65 FE) TABLET DELAYED RESPONSE at 08:41

## 2021-01-01 RX ADMIN — BENZONATATE 200 MG: 100 CAPSULE ORAL at 21:52

## 2021-01-01 RX ADMIN — ASPIRIN 81 MG CHEWABLE TABLET 81 MG: 81 TABLET CHEWABLE at 08:25

## 2021-01-01 RX ADMIN — HEPARIN SODIUM AND DEXTROSE 12 UNITS/KG/HR: 10000; 5 INJECTION INTRAVENOUS at 16:49

## 2021-01-01 RX ADMIN — HYDRALAZINE HYDROCHLORIDE 10 MG: 10 TABLET, FILM COATED ORAL at 15:47

## 2021-01-01 RX ADMIN — ATORVASTATIN CALCIUM 10 MG: 10 TABLET, FILM COATED ORAL at 15:19

## 2021-01-01 RX ADMIN — APIXABAN 5 MG: 5 TABLET, FILM COATED ORAL at 20:05

## 2021-01-01 RX ADMIN — FERROUS SULFATE TAB EC 324 MG (65 MG FE EQUIVALENT) 324 MG: 324 (65 FE) TABLET DELAYED RESPONSE at 09:14

## 2021-01-01 RX ADMIN — POLYETHYLENE GLYCOL 3350 17 G: 17 POWDER, FOR SOLUTION ORAL at 08:28

## 2021-01-01 RX ADMIN — Medication 1 CAPSULE: at 21:50

## 2021-01-01 RX ADMIN — TAMSULOSIN HYDROCHLORIDE 0.4 MG: 0.4 CAPSULE ORAL at 20:40

## 2021-01-01 RX ADMIN — BUDESONIDE AND FORMOTEROL FUMARATE DIHYDRATE 2 PUFF: 80; 4.5 AEROSOL RESPIRATORY (INHALATION) at 19:02

## 2021-01-01 RX ADMIN — MUPIROCIN: 20 OINTMENT TOPICAL at 08:07

## 2021-01-01 RX ADMIN — IPRATROPIUM BROMIDE AND ALBUTEROL SULFATE 3 ML: .5; 3 SOLUTION RESPIRATORY (INHALATION) at 06:36

## 2021-01-01 RX ADMIN — HEPARIN SODIUM 5000 UNITS: 5000 INJECTION INTRAVENOUS; SUBCUTANEOUS at 21:24

## 2021-01-01 RX ADMIN — Medication 5 MG: at 21:20

## 2021-01-01 RX ADMIN — IPRATROPIUM BROMIDE AND ALBUTEROL SULFATE 3 ML: .5; 3 SOLUTION RESPIRATORY (INHALATION) at 07:23

## 2021-01-01 RX ADMIN — CLOPIDOGREL BISULFATE 75 MG: 75 TABLET ORAL at 08:21

## 2021-01-01 RX ADMIN — HYDRALAZINE HYDROCHLORIDE 10 MG: 10 TABLET, FILM COATED ORAL at 14:12

## 2021-01-01 RX ADMIN — ESCITALOPRAM OXALATE 10 MG: 10 TABLET ORAL at 08:45

## 2021-01-01 RX ADMIN — MEBROFENIN 1 DOSE: 45 INJECTION, POWDER, LYOPHILIZED, FOR SOLUTION INTRAVENOUS at 11:45

## 2021-01-01 RX ADMIN — SODIUM CHLORIDE, PRESERVATIVE FREE 10 ML: 5 INJECTION INTRAVENOUS at 20:48

## 2021-01-01 RX ADMIN — ESCITALOPRAM OXALATE 10 MG: 10 TABLET ORAL at 08:40

## 2021-01-01 RX ADMIN — ATORVASTATIN CALCIUM 10 MG: 10 TABLET, FILM COATED ORAL at 18:33

## 2021-01-01 RX ADMIN — ASPIRIN 81 MG CHEWABLE TABLET 81 MG: 81 TABLET CHEWABLE at 08:26

## 2021-01-01 RX ADMIN — CARBOXYMETHYLCELLULOSE SODIUM 1 DROP: 5 SOLUTION/ DROPS OPHTHALMIC at 08:22

## 2021-01-01 RX ADMIN — CLOPIDOGREL BISULFATE 75 MG: 75 TABLET ORAL at 08:40

## 2021-01-01 RX ADMIN — IPRATROPIUM BROMIDE AND ALBUTEROL SULFATE 3 ML: .5; 3 SOLUTION RESPIRATORY (INHALATION) at 12:18

## 2021-01-01 RX ADMIN — MUPIROCIN: 20 OINTMENT TOPICAL at 21:22

## 2021-01-01 RX ADMIN — FINASTERIDE 5 MG: 5 TABLET, FILM COATED ORAL at 08:32

## 2021-01-01 RX ADMIN — POTASSIUM CHLORIDE 10 MEQ: 10 CAPSULE, COATED, EXTENDED RELEASE ORAL at 08:40

## 2021-01-01 RX ADMIN — CARBOXYMETHYLCELLULOSE SODIUM 1 DROP: 5 SOLUTION/ DROPS OPHTHALMIC at 09:04

## 2021-01-01 RX ADMIN — ATORVASTATIN CALCIUM 10 MG: 10 TABLET, FILM COATED ORAL at 16:34

## 2021-01-01 RX ADMIN — STANDARDIZED SENNA CONCENTRATE AND DOCUSATE SODIUM 1 TABLET: 8.6; 5 TABLET, FILM COATED ORAL at 20:03

## 2021-01-01 RX ADMIN — POLYETHYLENE GLYCOL 3350 17 G: 17 POWDER, FOR SOLUTION ORAL at 09:03

## 2021-01-01 RX ADMIN — MUPIROCIN: 20 OINTMENT TOPICAL at 10:25

## 2021-01-01 RX ADMIN — HYDROMORPHONE HYDROCHLORIDE 0.5 MG: 1 INJECTION, SOLUTION INTRAMUSCULAR; INTRAVENOUS; SUBCUTANEOUS at 04:33

## 2021-01-01 RX ADMIN — STANDARDIZED SENNA CONCENTRATE AND DOCUSATE SODIUM 1 TABLET: 8.6; 5 TABLET, FILM COATED ORAL at 20:40

## 2021-01-01 RX ADMIN — METOPROLOL TARTRATE 25 MG: 25 TABLET, FILM COATED ORAL at 14:22

## 2021-01-01 RX ADMIN — IPRATROPIUM BROMIDE AND ALBUTEROL SULFATE 3 ML: .5; 3 SOLUTION RESPIRATORY (INHALATION) at 00:34

## 2021-01-01 RX ADMIN — CARBOXYMETHYLCELLULOSE SODIUM 1 DROP: 5 SOLUTION/ DROPS OPHTHALMIC at 09:02

## 2021-01-01 RX ADMIN — HYDROCODONE BITARTRATE AND ACETAMINOPHEN 1 TABLET: 5; 325 TABLET ORAL at 05:13

## 2021-01-01 RX ADMIN — TAZOBACTAM SODIUM AND PIPERACILLIN SODIUM 3.38 G: 375; 3 INJECTION, SOLUTION INTRAVENOUS at 10:18

## 2021-01-01 RX ADMIN — OXYBUTYNIN CHLORIDE 5 MG: 5 TABLET ORAL at 20:36

## 2021-01-01 RX ADMIN — BUDESONIDE 0.5 MG: 0.5 INHALANT RESPIRATORY (INHALATION) at 06:30

## 2021-01-01 RX ADMIN — IPRATROPIUM BROMIDE AND ALBUTEROL SULFATE 3 ML: .5; 3 SOLUTION RESPIRATORY (INHALATION) at 19:04

## 2021-01-01 RX ADMIN — LORAZEPAM 0.25 MG: 2 INJECTION INTRAMUSCULAR; INTRAVENOUS at 15:40

## 2021-01-01 RX ADMIN — IPRATROPIUM BROMIDE AND ALBUTEROL SULFATE 3 ML: .5; 3 SOLUTION RESPIRATORY (INHALATION) at 18:09

## 2021-01-01 RX ADMIN — METHYLPREDNISOLONE SODIUM SUCCINATE 125 MG: 125 INJECTION, POWDER, FOR SOLUTION INTRAMUSCULAR; INTRAVENOUS at 13:09

## 2021-01-01 RX ADMIN — HYDROCODONE BITARTRATE AND ACETAMINOPHEN 1 TABLET: 5; 325 TABLET ORAL at 21:01

## 2021-01-01 RX ADMIN — ACETAMINOPHEN 650 MG: 325 TABLET ORAL at 05:07

## 2021-01-01 RX ADMIN — MIRTAZAPINE 15 MG: 15 TABLET, FILM COATED ORAL at 20:47

## 2021-01-01 RX ADMIN — CARBOXYMETHYLCELLULOSE SODIUM 1 DROP: 5 SOLUTION/ DROPS OPHTHALMIC at 20:55

## 2021-01-01 RX ADMIN — FUROSEMIDE 80 MG: 10 INJECTION, SOLUTION INTRAVENOUS at 16:06

## 2021-01-01 RX ADMIN — METHYLPREDNISOLONE SODIUM SUCCINATE 40 MG: 40 INJECTION, POWDER, FOR SOLUTION INTRAMUSCULAR; INTRAVENOUS at 20:43

## 2021-01-01 RX ADMIN — TRAZODONE HYDROCHLORIDE 50 MG: 50 TABLET ORAL at 20:46

## 2021-01-01 RX ADMIN — ARFORMOTEROL TARTRATE 15 MCG: 15 SOLUTION RESPIRATORY (INHALATION) at 06:31

## 2021-01-01 RX ADMIN — SODIUM CHLORIDE, PRESERVATIVE FREE 10 ML: 5 INJECTION INTRAVENOUS at 02:05

## 2021-01-01 RX ADMIN — ESCITALOPRAM OXALATE 10 MG: 10 TABLET ORAL at 08:25

## 2021-01-01 RX ADMIN — SODIUM CHLORIDE, PRESERVATIVE FREE 10 ML: 5 INJECTION INTRAVENOUS at 08:03

## 2021-01-01 RX ADMIN — ESCITALOPRAM OXALATE 10 MG: 10 TABLET ORAL at 09:03

## 2021-01-01 RX ADMIN — TAZOBACTAM SODIUM AND PIPERACILLIN SODIUM 3.38 G: 375; 3 INJECTION, SOLUTION INTRAVENOUS at 18:02

## 2021-01-01 RX ADMIN — ENOXAPARIN SODIUM 40 MG: 40 INJECTION SUBCUTANEOUS at 19:11

## 2021-01-01 RX ADMIN — TAMSULOSIN HYDROCHLORIDE 0.4 MG: 0.4 CAPSULE ORAL at 21:01

## 2021-01-01 RX ADMIN — PREDNISONE 40 MG: 20 TABLET ORAL at 08:03

## 2021-01-01 RX ADMIN — IPRATROPIUM BROMIDE AND ALBUTEROL SULFATE 3 ML: .5; 3 SOLUTION RESPIRATORY (INHALATION) at 19:31

## 2021-01-01 RX ADMIN — SODIUM CHLORIDE, PRESERVATIVE FREE 10 ML: 5 INJECTION INTRAVENOUS at 20:40

## 2021-01-01 RX ADMIN — MUPIROCIN 1 APPLICATION: 20 OINTMENT TOPICAL at 21:27

## 2021-01-01 RX ADMIN — ESCITALOPRAM OXALATE 10 MG: 10 TABLET ORAL at 08:15

## 2021-01-01 RX ADMIN — CLOPIDOGREL BISULFATE 75 MG: 75 TABLET ORAL at 08:27

## 2021-01-01 RX ADMIN — MUPIROCIN: 20 OINTMENT TOPICAL at 09:04

## 2021-01-01 RX ADMIN — STANDARDIZED SENNA CONCENTRATE AND DOCUSATE SODIUM 1 TABLET: 8.6; 5 TABLET, FILM COATED ORAL at 08:25

## 2021-01-01 RX ADMIN — HYDROCODONE BITARTRATE AND ACETAMINOPHEN 1 TABLET: 5; 325 TABLET ORAL at 21:22

## 2021-01-01 RX ADMIN — SODIUM CHLORIDE, PRESERVATIVE FREE 10 ML: 5 INJECTION INTRAVENOUS at 21:50

## 2021-01-01 RX ADMIN — ONDANSETRON 4 MG: 2 INJECTION INTRAMUSCULAR; INTRAVENOUS at 02:05

## 2021-01-01 RX ADMIN — DOCUSATE SODIUM 50MG AND SENNOSIDES 8.6MG 2 TABLET: 8.6; 5 TABLET, FILM COATED ORAL at 20:47

## 2021-01-01 RX ADMIN — FAMOTIDINE 40 MG: 20 TABLET ORAL at 08:38

## 2021-01-01 RX ADMIN — SODIUM CHLORIDE 75 ML/HR: 9 INJECTION, SOLUTION INTRAVENOUS at 16:34

## 2021-01-01 RX ADMIN — HYDRALAZINE HYDROCHLORIDE 10 MG: 10 TABLET, FILM COATED ORAL at 21:01

## 2021-01-01 RX ADMIN — PREDNISONE 10 MG: 10 TABLET ORAL at 08:52

## 2021-01-01 RX ADMIN — CARBOXYMETHYLCELLULOSE SODIUM 1 DROP: 5 SOLUTION/ DROPS OPHTHALMIC at 20:40

## 2021-01-01 RX ADMIN — FUROSEMIDE 80 MG: 10 INJECTION, SOLUTION INTRAMUSCULAR; INTRAVENOUS at 12:26

## 2021-01-01 RX ADMIN — ACETAMINOPHEN 650 MG: 325 TABLET ORAL at 07:27

## 2021-01-01 RX ADMIN — CLOPIDOGREL BISULFATE 75 MG: 75 TABLET ORAL at 08:04

## 2021-01-01 RX ADMIN — ESCITALOPRAM OXALATE 10 MG: 10 TABLET ORAL at 08:18

## 2021-01-01 RX ADMIN — CARBOXYMETHYLCELLULOSE SODIUM 1 DROP: 5 SOLUTION/ DROPS OPHTHALMIC at 16:36

## 2021-01-01 RX ADMIN — CARBOXYMETHYLCELLULOSE SODIUM 1 DROP: 5 SOLUTION/ DROPS OPHTHALMIC at 16:01

## 2021-01-01 RX ADMIN — CARBOXYMETHYLCELLULOSE SODIUM 1 DROP: 5 SOLUTION/ DROPS OPHTHALMIC at 20:03

## 2021-01-01 RX ADMIN — HEPARIN SODIUM 5000 UNITS: 5000 INJECTION INTRAVENOUS; SUBCUTANEOUS at 22:15

## 2021-01-01 RX ADMIN — ACETAMINOPHEN 650 MG: 650 SOLUTION ORAL at 06:12

## 2021-01-01 RX ADMIN — LIDOCAINE 1 PATCH: 50 PATCH CUTANEOUS at 18:25

## 2021-01-01 RX ADMIN — MUPIROCIN: 20 OINTMENT TOPICAL at 08:14

## 2021-01-01 RX ADMIN — CARBOXYMETHYLCELLULOSE SODIUM 1 DROP: 5 SOLUTION/ DROPS OPHTHALMIC at 08:24

## 2021-01-01 RX ADMIN — ALPRAZOLAM 0.25 MG: 0.25 TABLET ORAL at 20:03

## 2021-01-01 RX ADMIN — MUPIROCIN: 20 OINTMENT TOPICAL at 08:27

## 2021-01-01 RX ADMIN — STANDARDIZED SENNA CONCENTRATE AND DOCUSATE SODIUM 1 TABLET: 8.6; 5 TABLET, FILM COATED ORAL at 20:05

## 2021-01-01 RX ADMIN — HYDRALAZINE HYDROCHLORIDE 10 MG: 10 TABLET, FILM COATED ORAL at 15:19

## 2021-01-01 RX ADMIN — PANTOPRAZOLE SODIUM 40 MG: 40 TABLET, DELAYED RELEASE ORAL at 08:02

## 2021-01-01 RX ADMIN — WHITE PETROLATUM, ZINC OXIDE: 66.2; 13.4 OINTMENT TOPICAL at 12:41

## 2021-01-01 RX ADMIN — TAZOBACTAM SODIUM AND PIPERACILLIN SODIUM 3.38 G: 375; 3 INJECTION, SOLUTION INTRAVENOUS at 18:06

## 2021-01-01 RX ADMIN — TRAZODONE HYDROCHLORIDE 50 MG: 50 TABLET ORAL at 16:00

## 2021-01-01 RX ADMIN — Medication 5 MG: at 21:19

## 2021-01-01 RX ADMIN — Medication 150 MG: at 08:15

## 2021-01-01 RX ADMIN — DOCUSATE SODIUM 50MG AND SENNOSIDES 8.6MG 2 TABLET: 8.6; 5 TABLET, FILM COATED ORAL at 08:41

## 2021-01-01 RX ADMIN — TAZOBACTAM SODIUM AND PIPERACILLIN SODIUM 3.38 G: 375; 3 INJECTION, SOLUTION INTRAVENOUS at 02:19

## 2021-01-01 RX ADMIN — DOCUSATE SODIUM 50MG AND SENNOSIDES 8.6MG 2 TABLET: 8.6; 5 TABLET, FILM COATED ORAL at 20:36

## 2021-01-01 RX ADMIN — CARBOXYMETHYLCELLULOSE SODIUM 1 DROP: 5 SOLUTION/ DROPS OPHTHALMIC at 15:15

## 2021-01-01 RX ADMIN — PANTOPRAZOLE SODIUM 40 MG: 40 TABLET, DELAYED RELEASE ORAL at 08:45

## 2021-01-01 RX ADMIN — IPRATROPIUM BROMIDE AND ALBUTEROL SULFATE 3 ML: .5; 3 SOLUTION RESPIRATORY (INHALATION) at 18:39

## 2021-01-01 RX ADMIN — HYDROCODONE BITARTRATE AND ACETAMINOPHEN 1 TABLET: 5; 325 TABLET ORAL at 09:28

## 2021-01-01 RX ADMIN — Medication 1 CAPSULE: at 08:45

## 2021-01-01 RX ADMIN — IPRATROPIUM BROMIDE AND ALBUTEROL SULFATE 3 ML: .5; 3 SOLUTION RESPIRATORY (INHALATION) at 19:25

## 2021-01-01 RX ADMIN — DILTIAZEM HYDROCHLORIDE 240 MG: 240 CAPSULE, COATED, EXTENDED RELEASE ORAL at 08:41

## 2021-01-01 RX ADMIN — HYDROMORPHONE HYDROCHLORIDE 0.5 MG: 1 INJECTION, SOLUTION INTRAMUSCULAR; INTRAVENOUS; SUBCUTANEOUS at 16:40

## 2021-01-01 RX ADMIN — FERROUS SULFATE TAB EC 324 MG (65 MG FE EQUIVALENT) 324 MG: 324 (65 FE) TABLET DELAYED RESPONSE at 08:38

## 2021-01-01 RX ADMIN — METHYLPREDNISOLONE SODIUM SUCCINATE 40 MG: 40 INJECTION, POWDER, FOR SOLUTION INTRAMUSCULAR; INTRAVENOUS at 05:38

## 2021-01-01 RX ADMIN — ATORVASTATIN CALCIUM 10 MG: 10 TABLET, FILM COATED ORAL at 16:52

## 2021-01-01 RX ADMIN — FUROSEMIDE 40 MG: 10 INJECTION, SOLUTION INTRAMUSCULAR; INTRAVENOUS at 21:22

## 2021-01-01 RX ADMIN — ALPRAZOLAM 0.25 MG: 0.25 TABLET ORAL at 10:25

## 2021-01-01 RX ADMIN — HYDRALAZINE HYDROCHLORIDE 10 MG: 10 TABLET, FILM COATED ORAL at 15:15

## 2021-01-01 RX ADMIN — CARBOXYMETHYLCELLULOSE SODIUM 1 DROP: 5 SOLUTION/ DROPS OPHTHALMIC at 16:40

## 2021-01-01 RX ADMIN — FERROUS SULFATE TAB EC 324 MG (65 MG FE EQUIVALENT) 324 MG: 324 (65 FE) TABLET DELAYED RESPONSE at 08:22

## 2021-01-01 RX ADMIN — HYDRALAZINE HYDROCHLORIDE 10 MG: 10 TABLET, FILM COATED ORAL at 05:28

## 2021-01-01 RX ADMIN — MAGNESIUM SULFATE HEPTAHYDRATE 2 G: 2 INJECTION, SOLUTION INTRAVENOUS at 10:58

## 2021-01-01 RX ADMIN — METHYLPREDNISOLONE SODIUM SUCCINATE 40 MG: 40 INJECTION, POWDER, FOR SOLUTION INTRAMUSCULAR; INTRAVENOUS at 13:44

## 2021-01-01 RX ADMIN — ALPRAZOLAM 0.25 MG: 0.25 TABLET ORAL at 21:03

## 2021-01-01 RX ADMIN — POLYETHYLENE GLYCOL 3350 17 G: 17 POWDER, FOR SOLUTION ORAL at 08:14

## 2021-01-01 RX ADMIN — TORSEMIDE 100 MG: 100 TABLET ORAL at 08:22

## 2021-01-01 RX ADMIN — TAZOBACTAM SODIUM AND PIPERACILLIN SODIUM 3.38 G: 375; 3 INJECTION, SOLUTION INTRAVENOUS at 18:22

## 2021-01-01 RX ADMIN — Medication 5 MG: at 20:46

## 2021-01-01 RX ADMIN — HYDROCODONE BITARTRATE AND ACETAMINOPHEN 1 TABLET: 5; 325 TABLET ORAL at 17:44

## 2021-01-01 RX ADMIN — POLYETHYLENE GLYCOL 3350 17 G: 17 POWDER, FOR SOLUTION ORAL at 15:25

## 2021-01-01 RX ADMIN — FINASTERIDE 5 MG: 5 TABLET, FILM COATED ORAL at 08:02

## 2021-01-01 RX ADMIN — BISACODYL 10 MG: 10 SUPPOSITORY RECTAL at 08:26

## 2021-01-01 RX ADMIN — MIRTAZAPINE 15 MG: 15 TABLET, FILM COATED ORAL at 21:20

## 2021-01-01 RX ADMIN — ESCITALOPRAM OXALATE 10 MG: 10 TABLET ORAL at 08:38

## 2021-01-01 RX ADMIN — IPRATROPIUM BROMIDE AND ALBUTEROL SULFATE 3 ML: .5; 3 SOLUTION RESPIRATORY (INHALATION) at 07:11

## 2021-01-01 RX ADMIN — HYDRALAZINE HYDROCHLORIDE 10 MG: 10 TABLET, FILM COATED ORAL at 06:29

## 2021-01-01 RX ADMIN — TAZOBACTAM SODIUM AND PIPERACILLIN SODIUM 3.38 G: 375; 3 INJECTION, SOLUTION INTRAVENOUS at 19:07

## 2021-01-01 RX ADMIN — MUPIROCIN: 20 OINTMENT TOPICAL at 08:52

## 2021-01-01 RX ADMIN — STANDARDIZED SENNA CONCENTRATE AND DOCUSATE SODIUM 1 TABLET: 8.6; 5 TABLET, FILM COATED ORAL at 21:01

## 2021-01-01 RX ADMIN — Medication 5 MG: at 20:36

## 2021-01-01 RX ADMIN — LORAZEPAM 0.25 MG: 2 INJECTION INTRAMUSCULAR; INTRAVENOUS at 04:17

## 2021-01-01 RX ADMIN — HYDROCODONE BITARTRATE AND ACETAMINOPHEN 1 TABLET: 5; 325 TABLET ORAL at 12:39

## 2021-01-01 RX ADMIN — IPRATROPIUM BROMIDE AND ALBUTEROL SULFATE 3 ML: .5; 3 SOLUTION RESPIRATORY (INHALATION) at 01:33

## 2021-01-01 RX ADMIN — DILTIAZEM HYDROCHLORIDE 240 MG: 240 CAPSULE, COATED, EXTENDED RELEASE ORAL at 08:21

## 2021-01-01 RX ADMIN — IPRATROPIUM BROMIDE AND ALBUTEROL SULFATE 3 ML: .5; 3 SOLUTION RESPIRATORY (INHALATION) at 06:56

## 2021-01-01 RX ADMIN — ACETAMINOPHEN 650 MG: 325 TABLET ORAL at 16:48

## 2021-01-01 RX ADMIN — APIXABAN 5 MG: 5 TABLET, FILM COATED ORAL at 20:54

## 2021-01-01 RX ADMIN — ARFORMOTEROL TARTRATE 15 MCG: 15 SOLUTION RESPIRATORY (INHALATION) at 06:36

## 2021-01-01 RX ADMIN — ONDANSETRON 4 MG: 2 INJECTION INTRAMUSCULAR; INTRAVENOUS at 10:31

## 2021-01-01 RX ADMIN — VANCOMYCIN HYDROCHLORIDE 500 MG: 500 INJECTION, POWDER, LYOPHILIZED, FOR SOLUTION INTRAVENOUS at 15:05

## 2021-01-01 RX ADMIN — SODIUM CHLORIDE, PRESERVATIVE FREE 10 ML: 5 INJECTION INTRAVENOUS at 08:25

## 2021-01-01 RX ADMIN — FINASTERIDE 5 MG: 5 TABLET, FILM COATED ORAL at 08:18

## 2021-01-01 RX ADMIN — IPRATROPIUM BROMIDE AND ALBUTEROL SULFATE 3 ML: .5; 3 SOLUTION RESPIRATORY (INHALATION) at 20:17

## 2021-01-01 RX ADMIN — ESCITALOPRAM OXALATE 20 MG: 20 TABLET ORAL at 08:08

## 2021-01-01 RX ADMIN — IPRATROPIUM BROMIDE AND ALBUTEROL SULFATE 3 ML: .5; 3 SOLUTION RESPIRATORY (INHALATION) at 00:20

## 2021-01-01 RX ADMIN — ACETAMINOPHEN 650 MG: 325 TABLET ORAL at 09:06

## 2021-01-01 RX ADMIN — ENOXAPARIN SODIUM 40 MG: 40 INJECTION SUBCUTANEOUS at 16:06

## 2021-01-01 RX ADMIN — CEFTRIAXONE 1 G: 1 INJECTION, SOLUTION INTRAVENOUS at 09:10

## 2021-01-01 RX ADMIN — HYDROMORPHONE HYDROCHLORIDE 0.5 MG: 1 INJECTION, SOLUTION INTRAMUSCULAR; INTRAVENOUS; SUBCUTANEOUS at 04:19

## 2021-01-01 RX ADMIN — PANTOPRAZOLE SODIUM 40 MG: 40 TABLET, DELAYED RELEASE ORAL at 09:03

## 2021-01-01 RX ADMIN — POTASSIUM CHLORIDE 10 MEQ: 10 CAPSULE, COATED, EXTENDED RELEASE ORAL at 09:12

## 2021-01-01 RX ADMIN — MIRTAZAPINE 15 MG: 15 TABLET, FILM COATED ORAL at 20:36

## 2021-01-01 RX ADMIN — IPRATROPIUM BROMIDE AND ALBUTEROL SULFATE 3 ML: .5; 3 SOLUTION RESPIRATORY (INHALATION) at 12:59

## 2021-01-01 RX ADMIN — PANTOPRAZOLE SODIUM 40 MG: 40 TABLET, DELAYED RELEASE ORAL at 08:22

## 2021-01-01 RX ADMIN — SODIUM CHLORIDE, PRESERVATIVE FREE 3 ML: 5 INJECTION INTRAVENOUS at 20:36

## 2021-01-01 RX ADMIN — TAZOBACTAM SODIUM AND PIPERACILLIN SODIUM 3.38 G: 375; 3 INJECTION, SOLUTION INTRAVENOUS at 18:13

## 2021-01-01 RX ADMIN — Medication 5 MG: at 21:52

## 2021-01-01 RX ADMIN — IPRATROPIUM BROMIDE AND ALBUTEROL SULFATE 3 ML: .5; 3 SOLUTION RESPIRATORY (INHALATION) at 00:22

## 2021-01-01 RX ADMIN — DOCUSATE SODIUM 50MG AND SENNOSIDES 8.6MG 2 TABLET: 8.6; 5 TABLET, FILM COATED ORAL at 21:20

## 2021-01-01 RX ADMIN — FINASTERIDE 5 MG: 5 TABLET, FILM COATED ORAL at 08:27

## 2021-01-01 RX ADMIN — IPRATROPIUM BROMIDE AND ALBUTEROL SULFATE 3 ML: .5; 3 SOLUTION RESPIRATORY (INHALATION) at 03:01

## 2021-01-01 RX ADMIN — HYDROCODONE BITARTRATE AND ACETAMINOPHEN 1 TABLET: 5; 325 TABLET ORAL at 20:02

## 2021-01-01 RX ADMIN — CLOPIDOGREL BISULFATE 75 MG: 75 TABLET ORAL at 08:38

## 2021-01-01 RX ADMIN — ARFORMOTEROL TARTRATE 15 MCG: 15 SOLUTION RESPIRATORY (INHALATION) at 06:54

## 2021-01-01 RX ADMIN — Medication 5 MG: at 21:03

## 2021-01-01 RX ADMIN — TRAZODONE HYDROCHLORIDE 50 MG: 50 TABLET ORAL at 17:12

## 2021-01-01 RX ADMIN — ATORVASTATIN CALCIUM 10 MG: 10 TABLET, FILM COATED ORAL at 18:06

## 2021-01-01 RX ADMIN — SODIUM CHLORIDE, PRESERVATIVE FREE 10 ML: 5 INJECTION INTRAVENOUS at 08:42

## 2021-01-01 RX ADMIN — ATORVASTATIN CALCIUM 10 MG: 10 TABLET, FILM COATED ORAL at 21:10

## 2021-01-01 RX ADMIN — BENZONATATE 200 MG: 100 CAPSULE ORAL at 20:36

## 2021-01-01 RX ADMIN — DOCUSATE SODIUM 50MG AND SENNOSIDES 8.6MG 2 TABLET: 8.6; 5 TABLET, FILM COATED ORAL at 08:21

## 2021-01-01 RX ADMIN — ALPRAZOLAM 0.25 MG: 0.25 TABLET ORAL at 14:31

## 2021-01-01 RX ADMIN — HYDROCODONE BITARTRATE AND ACETAMINOPHEN 1 TABLET: 5; 325 TABLET ORAL at 15:48

## 2021-01-01 RX ADMIN — IPRATROPIUM BROMIDE AND ALBUTEROL SULFATE 3 ML: .5; 3 SOLUTION RESPIRATORY (INHALATION) at 20:12

## 2021-01-01 RX ADMIN — PANTOPRAZOLE SODIUM 40 MG: 40 TABLET, DELAYED RELEASE ORAL at 06:37

## 2021-01-01 RX ADMIN — AZITHROMYCIN 500 MG: 500 INJECTION, POWDER, LYOPHILIZED, FOR SOLUTION INTRAVENOUS at 17:19

## 2021-01-01 RX ADMIN — ACETAMINOPHEN 650 MG: 325 TABLET ORAL at 21:54

## 2021-01-01 RX ADMIN — PANTOPRAZOLE SODIUM 40 MG: 40 TABLET, DELAYED RELEASE ORAL at 09:04

## 2021-01-01 RX ADMIN — TRAZODONE HYDROCHLORIDE 50 MG: 50 TABLET ORAL at 20:36

## 2021-01-01 RX ADMIN — HYDROCODONE BITARTRATE AND ACETAMINOPHEN 1 TABLET: 5; 325 TABLET ORAL at 13:00

## 2021-01-01 RX ADMIN — ESCITALOPRAM OXALATE 10 MG: 10 TABLET ORAL at 08:26

## 2021-01-01 RX ADMIN — CARBOXYMETHYLCELLULOSE SODIUM 1 DROP: 5 SOLUTION/ DROPS OPHTHALMIC at 08:28

## 2021-01-01 RX ADMIN — HYDROMORPHONE HYDROCHLORIDE 0.5 MG: 1 INJECTION, SOLUTION INTRAMUSCULAR; INTRAVENOUS; SUBCUTANEOUS at 12:26

## 2021-01-01 RX ADMIN — TAMSULOSIN HYDROCHLORIDE 0.4 MG: 0.4 CAPSULE ORAL at 20:03

## 2021-01-01 RX ADMIN — ASPIRIN 81 MG CHEWABLE TABLET 81 MG: 81 TABLET CHEWABLE at 08:07

## 2021-01-01 RX ADMIN — IPRATROPIUM BROMIDE AND ALBUTEROL SULFATE 3 ML: .5; 3 SOLUTION RESPIRATORY (INHALATION) at 00:32

## 2021-01-01 RX ADMIN — FERROUS SULFATE TAB EC 324 MG (65 MG FE EQUIVALENT) 324 MG: 324 (65 FE) TABLET DELAYED RESPONSE at 08:20

## 2021-01-01 RX ADMIN — MUPIROCIN: 20 OINTMENT TOPICAL at 21:01

## 2021-01-01 RX ADMIN — PANTOPRAZOLE SODIUM 40 MG: 40 TABLET, DELAYED RELEASE ORAL at 06:48

## 2021-01-01 RX ADMIN — TAMSULOSIN HYDROCHLORIDE 0.4 MG: 0.4 CAPSULE ORAL at 21:42

## 2021-01-01 RX ADMIN — VANCOMYCIN HYDROCHLORIDE 500 MG: 500 INJECTION, POWDER, LYOPHILIZED, FOR SOLUTION INTRAVENOUS at 09:05

## 2021-01-01 RX ADMIN — ARFORMOTEROL TARTRATE 15 MCG: 15 SOLUTION RESPIRATORY (INHALATION) at 19:02

## 2021-01-01 RX ADMIN — IPRATROPIUM BROMIDE AND ALBUTEROL SULFATE 3 ML: .5; 3 SOLUTION RESPIRATORY (INHALATION) at 12:47

## 2021-01-01 RX ADMIN — ESCITALOPRAM OXALATE 10 MG: 10 TABLET ORAL at 09:15

## 2021-01-01 RX ADMIN — ASPIRIN 81 MG: 81 TABLET, CHEWABLE ORAL at 09:14

## 2021-01-01 RX ADMIN — BUDESONIDE AND FORMOTEROL FUMARATE DIHYDRATE 2 PUFF: 80; 4.5 AEROSOL RESPIRATORY (INHALATION) at 19:12

## 2021-01-01 RX ADMIN — ISOSORBIDE DINITRATE 10 MG: 10 TABLET ORAL at 08:45

## 2021-01-01 RX ADMIN — OXYBUTYNIN CHLORIDE 5 MG: 5 TABLET ORAL at 21:13

## 2021-01-01 RX ADMIN — TAZOBACTAM SODIUM AND PIPERACILLIN SODIUM 3.38 G: 375; 3 INJECTION, SOLUTION INTRAVENOUS at 18:53

## 2021-01-01 RX ADMIN — ENOXAPARIN SODIUM 40 MG: 40 INJECTION SUBCUTANEOUS at 17:08

## 2021-01-01 RX ADMIN — DOCUSATE SODIUM 50MG AND SENNOSIDES 8.6MG 2 TABLET: 8.6; 5 TABLET, FILM COATED ORAL at 21:19

## 2021-01-01 RX ADMIN — IPRATROPIUM BROMIDE AND ALBUTEROL SULFATE 3 ML: .5; 3 SOLUTION RESPIRATORY (INHALATION) at 12:27

## 2021-01-01 RX ADMIN — TORSEMIDE 40 MG: 20 TABLET ORAL at 08:46

## 2021-01-01 RX ADMIN — STANDARDIZED SENNA CONCENTRATE AND DOCUSATE SODIUM 1 TABLET: 8.6; 5 TABLET, FILM COATED ORAL at 08:17

## 2021-01-01 RX ADMIN — BISOPROLOL FUMARATE 10 MG: 5 TABLET ORAL at 08:27

## 2021-01-01 RX ADMIN — HYDROMORPHONE HYDROCHLORIDE 0.5 MG: 1 INJECTION, SOLUTION INTRAMUSCULAR; INTRAVENOUS; SUBCUTANEOUS at 22:29

## 2021-01-01 RX ADMIN — IPRATROPIUM BROMIDE AND ALBUTEROL SULFATE 3 ML: .5; 3 SOLUTION RESPIRATORY (INHALATION) at 19:52

## 2021-01-01 RX ADMIN — PANTOPRAZOLE SODIUM 40 MG: 40 TABLET, DELAYED RELEASE ORAL at 08:21

## 2021-01-01 RX ADMIN — HYDROMORPHONE HYDROCHLORIDE 0.5 MG: 1 INJECTION, SOLUTION INTRAMUSCULAR; INTRAVENOUS; SUBCUTANEOUS at 14:31

## 2021-01-01 RX ADMIN — SODIUM CHLORIDE 75 ML/HR: 4.5 INJECTION, SOLUTION INTRAVENOUS at 09:18

## 2021-01-01 RX ADMIN — ASPIRIN 81 MG CHEWABLE TABLET 81 MG: 81 TABLET CHEWABLE at 08:02

## 2021-01-01 RX ADMIN — POLYETHYLENE GLYCOL 3350 17 G: 17 POWDER, FOR SOLUTION ORAL at 20:53

## 2021-01-01 RX ADMIN — STANDARDIZED SENNA CONCENTRATE AND DOCUSATE SODIUM 1 TABLET: 8.6; 5 TABLET, FILM COATED ORAL at 08:02

## 2021-01-01 RX ADMIN — FERROUS SULFATE TAB EC 324 MG (65 MG FE EQUIVALENT) 324 MG: 324 (65 FE) TABLET DELAYED RESPONSE at 08:40

## 2021-01-01 RX ADMIN — APIXABAN 5 MG: 5 TABLET, FILM COATED ORAL at 08:07

## 2021-01-01 RX ADMIN — HYDROCODONE BITARTRATE AND ACETAMINOPHEN 1 TABLET: 5; 325 TABLET ORAL at 17:34

## 2021-01-01 RX ADMIN — STANDARDIZED SENNA CONCENTRATE AND DOCUSATE SODIUM 1 TABLET: 8.6; 5 TABLET, FILM COATED ORAL at 08:15

## 2021-01-01 RX ADMIN — HYDROMORPHONE HYDROCHLORIDE 0.5 MG: 1 INJECTION, SOLUTION INTRAMUSCULAR; INTRAVENOUS; SUBCUTANEOUS at 01:31

## 2021-01-01 RX ADMIN — DOCUSATE SODIUM 50MG AND SENNOSIDES 8.6MG 2 TABLET: 8.6; 5 TABLET, FILM COATED ORAL at 08:51

## 2021-01-01 RX ADMIN — FINASTERIDE 5 MG: 5 TABLET, FILM COATED ORAL at 08:52

## 2021-01-01 RX ADMIN — HYDROCODONE BITARTRATE AND ACETAMINOPHEN 1 TABLET: 5; 325 TABLET ORAL at 09:36

## 2021-01-01 RX ADMIN — HYDROCODONE BITARTRATE AND ACETAMINOPHEN 1 TABLET: 5; 325 TABLET ORAL at 05:52

## 2021-01-01 RX ADMIN — CLOPIDOGREL BISULFATE 75 MG: 75 TABLET ORAL at 09:03

## 2021-01-01 RX ADMIN — LIDOCAINE 1 PATCH: 50 PATCH CUTANEOUS at 18:18

## 2021-01-01 RX ADMIN — HYDROMORPHONE HYDROCHLORIDE 0.5 MG: 1 INJECTION, SOLUTION INTRAMUSCULAR; INTRAVENOUS; SUBCUTANEOUS at 04:18

## 2021-01-01 RX ADMIN — CLOPIDOGREL BISULFATE 75 MG: 75 TABLET ORAL at 08:25

## 2021-01-01 RX ADMIN — ISOSORBIDE DINITRATE 10 MG: 10 TABLET ORAL at 18:06

## 2021-01-01 RX ADMIN — ALPRAZOLAM 0.25 MG: 0.25 TABLET ORAL at 20:40

## 2021-01-01 RX ADMIN — CLOPIDOGREL BISULFATE 75 MG: 75 TABLET ORAL at 08:02

## 2021-01-01 RX ADMIN — ATORVASTATIN CALCIUM 10 MG: 10 TABLET, FILM COATED ORAL at 16:48

## 2021-01-01 RX ADMIN — ASPIRIN 81 MG CHEWABLE TABLET 81 MG: 81 TABLET CHEWABLE at 08:28

## 2021-01-01 RX ADMIN — ATORVASTATIN CALCIUM 10 MG: 10 TABLET, FILM COATED ORAL at 17:29

## 2021-01-01 RX ADMIN — FUROSEMIDE 40 MG: 10 INJECTION, SOLUTION INTRAVENOUS at 15:31

## 2021-01-01 RX ADMIN — IPRATROPIUM BROMIDE AND ALBUTEROL SULFATE 3 ML: .5; 3 SOLUTION RESPIRATORY (INHALATION) at 18:44

## 2021-01-01 RX ADMIN — IPRATROPIUM BROMIDE AND ALBUTEROL SULFATE 3 ML: .5; 3 SOLUTION RESPIRATORY (INHALATION) at 07:02

## 2021-01-01 RX ADMIN — MUPIROCIN: 20 OINTMENT TOPICAL at 08:28

## 2021-01-01 RX ADMIN — PANTOPRAZOLE SODIUM 40 MG: 40 TABLET, DELAYED RELEASE ORAL at 08:41

## 2021-01-01 RX ADMIN — HYDROMORPHONE HYDROCHLORIDE 0.5 MG: 1 INJECTION, SOLUTION INTRAMUSCULAR; INTRAVENOUS; SUBCUTANEOUS at 02:24

## 2021-01-01 RX ADMIN — FAMOTIDINE 40 MG: 20 TABLET ORAL at 17:19

## 2021-01-01 RX ADMIN — Medication 1 CAPSULE: at 21:29

## 2021-01-01 RX ADMIN — CLOPIDOGREL BISULFATE 75 MG: 75 TABLET ORAL at 08:22

## 2021-01-01 RX ADMIN — HYDROMORPHONE HYDROCHLORIDE 0.5 MG: 1 INJECTION, SOLUTION INTRAMUSCULAR; INTRAVENOUS; SUBCUTANEOUS at 09:22

## 2021-01-01 RX ADMIN — CEFTRIAXONE 1 G: 1 INJECTION, SOLUTION INTRAVENOUS at 12:08

## 2021-01-01 RX ADMIN — POLYETHYLENE GLYCOL 3350 17 G: 17 POWDER, FOR SOLUTION ORAL at 08:17

## 2021-01-01 RX ADMIN — METHYLPREDNISOLONE SODIUM SUCCINATE 40 MG: 40 INJECTION, POWDER, FOR SOLUTION INTRAMUSCULAR; INTRAVENOUS at 12:23

## 2021-01-01 RX ADMIN — AZITHROMYCIN 500 MG: 500 INJECTION, POWDER, LYOPHILIZED, FOR SOLUTION INTRAVENOUS at 18:34

## 2021-01-01 RX ADMIN — CARBOXYMETHYLCELLULOSE SODIUM 1 DROP: 5 SOLUTION/ DROPS OPHTHALMIC at 17:11

## 2021-01-01 RX ADMIN — SODIUM CHLORIDE, PRESERVATIVE FREE 3 ML: 5 INJECTION INTRAVENOUS at 20:54

## 2021-01-01 RX ADMIN — ENOXAPARIN SODIUM 30 MG: 30 INJECTION SUBCUTANEOUS at 01:44

## 2021-01-01 RX ADMIN — SODIUM CHLORIDE, PRESERVATIVE FREE 3 ML: 5 INJECTION INTRAVENOUS at 21:11

## 2021-01-01 RX ADMIN — TRAZODONE HYDROCHLORIDE 50 MG: 50 TABLET ORAL at 17:29

## 2021-01-01 RX ADMIN — ESCITALOPRAM OXALATE 10 MG: 10 TABLET ORAL at 08:27

## 2021-01-01 RX ADMIN — POTASSIUM CHLORIDE 10 MEQ: 10 CAPSULE, COATED, EXTENDED RELEASE ORAL at 08:38

## 2021-01-01 RX ADMIN — IPRATROPIUM BROMIDE AND ALBUTEROL SULFATE 3 ML: .5; 3 SOLUTION RESPIRATORY (INHALATION) at 00:06

## 2021-01-01 RX ADMIN — BUDESONIDE 0.5 MG: 0.5 INHALANT RESPIRATORY (INHALATION) at 06:36

## 2021-01-01 RX ADMIN — BUDESONIDE AND FORMOTEROL FUMARATE DIHYDRATE 2 PUFF: 80; 4.5 AEROSOL RESPIRATORY (INHALATION) at 19:52

## 2021-01-01 RX ADMIN — ALPRAZOLAM 0.25 MG: 0.25 TABLET ORAL at 20:36

## 2021-01-01 RX ADMIN — ISOSORBIDE DINITRATE 10 MG: 10 TABLET ORAL at 12:49

## 2021-01-01 RX ADMIN — CEFTRIAXONE 1 G: 1 INJECTION, SOLUTION INTRAVENOUS at 09:27

## 2021-01-01 RX ADMIN — CLOPIDOGREL BISULFATE 75 MG: 75 TABLET ORAL at 08:20

## 2021-01-01 RX ADMIN — CARBOXYMETHYLCELLULOSE SODIUM 1 DROP: 5 SOLUTION/ DROPS OPHTHALMIC at 21:27

## 2021-01-01 RX ADMIN — IPRATROPIUM BROMIDE AND ALBUTEROL SULFATE 3 ML: .5; 3 SOLUTION RESPIRATORY (INHALATION) at 20:04

## 2021-01-01 RX ADMIN — ESCITALOPRAM OXALATE 10 MG: 10 TABLET ORAL at 08:22

## 2021-01-01 RX ADMIN — ATORVASTATIN CALCIUM 10 MG: 10 TABLET, FILM COATED ORAL at 21:13

## 2021-01-01 RX ADMIN — POTASSIUM CHLORIDE 10 MEQ: 10 CAPSULE, COATED, EXTENDED RELEASE ORAL at 08:19

## 2021-01-01 RX ADMIN — ISOSORBIDE DINITRATE 10 MG: 10 TABLET ORAL at 13:08

## 2021-01-01 RX ADMIN — PREDNISONE 40 MG: 20 TABLET ORAL at 11:26

## 2021-01-01 RX ADMIN — ASPIRIN 81 MG CHEWABLE TABLET 81 MG: 81 TABLET CHEWABLE at 08:04

## 2021-01-01 RX ADMIN — SODIUM CHLORIDE 100 ML/HR: 9 INJECTION, SOLUTION INTRAVENOUS at 03:33

## 2021-01-01 RX ADMIN — MORPHINE SULFATE 2 MG: 2 INJECTION, SOLUTION INTRAMUSCULAR; INTRAVENOUS at 19:07

## 2021-01-01 RX ADMIN — SODIUM CHLORIDE, PRESERVATIVE FREE 3 ML: 5 INJECTION INTRAVENOUS at 08:41

## 2021-01-01 RX ADMIN — Medication 1 CAPSULE: at 09:04

## 2021-01-01 RX ADMIN — ISOSORBIDE DINITRATE 10 MG: 10 TABLET ORAL at 08:36

## 2021-01-01 RX ADMIN — ATORVASTATIN CALCIUM 10 MG: 10 TABLET, FILM COATED ORAL at 19:11

## 2021-01-01 RX ADMIN — IPRATROPIUM BROMIDE AND ALBUTEROL SULFATE 3 ML: .5; 3 SOLUTION RESPIRATORY (INHALATION) at 13:27

## 2021-01-01 RX ADMIN — FERROUS SULFATE TAB EC 324 MG (65 MG FE EQUIVALENT) 324 MG: 324 (65 FE) TABLET DELAYED RESPONSE at 08:52

## 2021-01-01 RX ADMIN — TRAZODONE HYDROCHLORIDE 50 MG: 50 TABLET ORAL at 17:09

## 2021-01-01 RX ADMIN — ALPRAZOLAM 0.25 MG: 0.25 TABLET ORAL at 20:58

## 2021-01-01 RX ADMIN — ASPIRIN 81 MG CHEWABLE TABLET 81 MG: 81 TABLET CHEWABLE at 08:31

## 2021-01-01 RX ADMIN — MUPIROCIN: 20 OINTMENT TOPICAL at 20:11

## 2021-01-01 RX ADMIN — ISOSORBIDE DINITRATE 10 MG: 10 TABLET ORAL at 08:28

## 2021-01-01 RX ADMIN — CARBOXYMETHYLCELLULOSE SODIUM 1 DROP: 5 SOLUTION/ DROPS OPHTHALMIC at 08:26

## 2021-01-01 RX ADMIN — FUROSEMIDE 40 MG: 10 INJECTION INTRAMUSCULAR; INTRAVENOUS at 12:20

## 2021-01-01 RX ADMIN — HYDROMORPHONE HYDROCHLORIDE 0.5 MG: 1 INJECTION, SOLUTION INTRAMUSCULAR; INTRAVENOUS; SUBCUTANEOUS at 10:27

## 2021-01-01 RX ADMIN — ATORVASTATIN CALCIUM 10 MG: 10 TABLET, FILM COATED ORAL at 20:53

## 2021-01-01 RX ADMIN — CARBOXYMETHYLCELLULOSE SODIUM 1 DROP: 5 SOLUTION/ DROPS OPHTHALMIC at 15:42

## 2021-01-01 RX ADMIN — ATORVASTATIN CALCIUM 10 MG: 10 TABLET, FILM COATED ORAL at 16:00

## 2021-01-01 RX ADMIN — ONDANSETRON 4 MG: 2 INJECTION INTRAMUSCULAR; INTRAVENOUS at 19:40

## 2021-01-01 RX ADMIN — BISOPROLOL FUMARATE 10 MG: 5 TABLET ORAL at 08:22

## 2021-01-01 RX ADMIN — CARBOXYMETHYLCELLULOSE SODIUM 1 DROP: 5 SOLUTION/ DROPS OPHTHALMIC at 18:33

## 2021-01-01 RX ADMIN — CARBOXYMETHYLCELLULOSE SODIUM 1 DROP: 5 SOLUTION/ DROPS OPHTHALMIC at 15:06

## 2021-01-01 RX ADMIN — FUROSEMIDE 20 MG: 20 TABLET ORAL at 08:20

## 2021-01-01 RX ADMIN — Medication 1 CAPSULE: at 08:22

## 2021-01-01 RX ADMIN — FINASTERIDE 5 MG: 5 TABLET, FILM COATED ORAL at 08:40

## 2021-01-01 RX ADMIN — TRAZODONE HYDROCHLORIDE 50 MG: 50 TABLET ORAL at 19:11

## 2021-01-01 RX ADMIN — HYDROMORPHONE HYDROCHLORIDE 0.5 MG: 1 INJECTION, SOLUTION INTRAMUSCULAR; INTRAVENOUS; SUBCUTANEOUS at 21:28

## 2021-01-01 RX ADMIN — HYDROCODONE BITARTRATE AND ACETAMINOPHEN 1 TABLET: 5; 325 TABLET ORAL at 05:28

## 2021-01-01 RX ADMIN — TAMSULOSIN HYDROCHLORIDE 0.4 MG: 0.4 CAPSULE ORAL at 20:46

## 2021-01-01 RX ADMIN — BUDESONIDE 0.5 MG: 0.5 INHALANT RESPIRATORY (INHALATION) at 18:09

## 2021-01-01 RX ADMIN — FUROSEMIDE 40 MG: 10 INJECTION, SOLUTION INTRAMUSCULAR; INTRAVENOUS at 08:04

## 2021-01-01 RX ADMIN — ATORVASTATIN CALCIUM 10 MG: 10 TABLET, FILM COATED ORAL at 20:46

## 2021-01-01 RX ADMIN — PANTOPRAZOLE SODIUM 40 MG: 40 TABLET, DELAYED RELEASE ORAL at 15:25

## 2021-01-01 RX ADMIN — STANDARDIZED SENNA CONCENTRATE AND DOCUSATE SODIUM 1 TABLET: 8.6; 5 TABLET, FILM COATED ORAL at 08:07

## 2021-01-01 RX ADMIN — ACETAMINOPHEN 650 MG: 325 TABLET ORAL at 12:39

## 2021-01-01 RX ADMIN — OXYBUTYNIN CHLORIDE 5 MG: 5 TABLET ORAL at 21:20

## 2021-01-01 RX ADMIN — FINASTERIDE 5 MG: 5 TABLET, FILM COATED ORAL at 09:13

## 2021-01-01 RX ADMIN — IPRATROPIUM BROMIDE AND ALBUTEROL SULFATE 3 ML: .5; 3 SOLUTION RESPIRATORY (INHALATION) at 19:12

## 2021-01-01 RX ADMIN — ASPIRIN 81 MG CHEWABLE TABLET 81 MG: 81 TABLET CHEWABLE at 08:51

## 2021-01-01 RX ADMIN — POLYETHYLENE GLYCOL 3350 17 G: 17 POWDER, FOR SOLUTION ORAL at 11:09

## 2021-01-01 RX ADMIN — FAMOTIDINE 40 MG: 20 TABLET ORAL at 08:40

## 2021-01-01 RX ADMIN — PREDNISONE 10 MG: 10 TABLET ORAL at 08:32

## 2021-01-01 RX ADMIN — TAZOBACTAM SODIUM AND PIPERACILLIN SODIUM 3.38 G: 375; 3 INJECTION, SOLUTION INTRAVENOUS at 11:23

## 2021-01-01 RX ADMIN — CARBOXYMETHYLCELLULOSE SODIUM 1 DROP: 5 SOLUTION/ DROPS OPHTHALMIC at 21:22

## 2021-01-01 RX ADMIN — AZITHROMYCIN 500 MG: 500 INJECTION, POWDER, LYOPHILIZED, FOR SOLUTION INTRAVENOUS at 18:18

## 2021-01-01 RX ADMIN — DILTIAZEM HYDROCHLORIDE 240 MG: 240 CAPSULE, COATED, EXTENDED RELEASE ORAL at 08:03

## 2021-01-01 RX ADMIN — ISOSORBIDE DINITRATE 10 MG: 10 TABLET ORAL at 17:07

## 2021-01-01 RX ADMIN — HYDROCODONE BITARTRATE AND ACETAMINOPHEN 1 TABLET: 5; 325 TABLET ORAL at 14:22

## 2021-01-01 RX ADMIN — CLOPIDOGREL BISULFATE 75 MG: 75 TABLET ORAL at 08:18

## 2021-01-04 NOTE — TELEPHONE ENCOUNTER
Yoana from [facility] pharmacy is calling for clarification on tetracycline (ACHROMYCIN,SUMYCIN) 500 MG capsule 3 times  A day and then a second script was called in for tetracycline (ACHROMYCIN,SUMYCIN) 500 MG capsule for 1 times a day... pharmacy needs clarificaition... please advise      Yoana [facility] ([place], [place]) Pharmacy call back [phone]

## 2021-01-04 NOTE — TELEPHONE ENCOUNTER
Patient called back, pt started taking his tetracycline yesterday 2 times daily po. Patient said he will finish his abx.

## 2021-01-04 NOTE — TELEPHONE ENCOUNTER
Patient has not started the medication already this weekend.  We can disregard the prescription.  Please double check to see if patient is taking it.

## 2021-01-04 NOTE — TELEPHONE ENCOUNTER
Patient contacted office regarding medication and states he started taking medication yesterday and is currently taking medication 2 times daily.     Please advise if patient should continue medication or needs to stop

## 2021-01-05 NOTE — OUTREACH NOTE
Medical Week 3 Survey      Responses   [facility] patient discharged from?  [facility]   Does the patient have one of the following disease processes/diagnoses(primary or secondary)?  Sepsis          Poonam Chambers RN

## 2021-01-07 NOTE — OUTREACH NOTE
Sepsis Week 3 Survey      Responses   [facility] patient discharged from?  [facility]   Does the patient have one of the following disease processes/diagnoses(primary or secondary)?  Sepsis   Week 3 attempt successful?  No   Unsuccessful attempts  Attempt 2          Corrina Ontiveros RN

## 2021-01-20 NOTE — ED PROVIDER NOTES
EMERGENCY DEPARTMENT ENCOUNTER    Room Number:  MAUDE/MAUDE  Date of encounter:  1/20/2021  PCP: Miguel Rojas MD  Historian: Patient      HPI:  Chief Complaint: Fall, bruising to left abdomen        Context: Jani Pena is a 70 y.o. male who presents to the ED arrives via EMS after a fall at home.  Patient states he was getting up to go to the restroom when he fell striking his left abdomen and left elbow on a chair.  He has a large amount of bruising to the left upper quadrant with left-sided abdominal pain.  No anticoagulants, but takes Plavix daily.  He denies nausea vomiting or back pain or shortness of breath hematuria.  No other symptoms or injuries at this time.      PAST MEDICAL HISTORY  Active Ambulatory Problems     Diagnosis Date Noted   • Anxiety 08/09/2016   • Atrial fibrillation (CMS/[facility]) 08/09/2016   • Chronic kidney disease 08/09/2016   • Steroid-dependent COPD (CMS/[facility]) 08/09/2016   • Degeneration of cervical intervertebral disc 08/09/2016   • Gastroesophageal reflux disease 08/09/2016   • Diverticulosis 08/09/2016   • Hemorrhoids 08/09/2016   • Hiatal hernia 08/09/2016   • Hyperlipidemia 08/09/2016   • Kyphosis, acquired 08/09/2016   • PEDRO on CPAP 08/09/2016   • Benign prostatic hyperplasia with incomplete bladder emptying 08/09/2016   • Depression    • History of ventricular septal defect    • Chronic diastolic congestive heart failure (CMS/[facility]) 03/19/2018   • Coronary artery disease involving native coronary artery of native heart with angina pectoris (CMS/[facility]) 05/24/2018   • Lumbar radiculopathy 08/14/2018   • Anemia 09/11/2018   • Cardiac pacemaker in situ 02/02/2019   • Sick sinus syndrome (CMS/[facility]) 02/03/2019   • Acute renal failure (ARF) (CMS/[facility]) 08/29/2019   • Chronic respiratory failure with hypoxia and hypercapnia (CMS/[facility]) 08/29/2019   • Hematoma 11/08/2019   • Flexural eczema 04/03/2020   • Xerosis of skin 04/03/2020   • Abnormal urinalysis 10/01/2020   • Esophageal dysphagia  09/18/2020   • Fall 10/27/2020   • Presbyesophagus 11/30/2020   • Sepsis secondary to UTI (CMS/HCC) 12/13/2020     Resolved Ambulatory Problems     Diagnosis Date Noted   • Anemia 08/09/2016   • Atherosclerotic heart disease of native coronary artery without angina pectoris 08/09/2016   • Chronic bronchitis (CMS/HCC) 08/09/2016   • Type 2 diabetes mellitus with stage 3 chronic kidney disease (CMS/HCC) 08/09/2016   • Esophagitis, reflux 08/09/2016   • Essential hypertension 08/09/2016   • Mitral and aortic valve disease 08/09/2016   • Phimosis 08/09/2016   • History of arthritis    • Back pain    • Cardiac pain    • AQUINO (dyspnea on exertion)    • Hematuria    • Acute on chronic respiratory failure with hypoxia and hypercapnia (CMS/HCC)    • Stroke syndrome    • COPD with exacerbation (CMS/HCC) 03/18/2017   • Neck pain 04/20/2017   • COPD exacerbation (CMS/HCC) 05/03/2017   • Pneumonia due to infectious organism 09/08/2017   • Impaired mobility and ADLs 09/17/2017   • Physical deconditioning 09/17/2017   • Acute exacerbation of chronic obstructive pulmonary disease (COPD) (CMS/HCC) 12/24/2017   • Bronchitis 02/20/2018   • Respiratory failure (CMS/HCC) 02/20/2018   • Dysphagia 08/14/2018   • Heartburn 09/11/2018   • Syncope and collapse 01/21/2019   • Ventricular tachycardia (CMS/HCC) 01/22/2019   • N&V (nausea and vomiting) 01/22/2019   • Scalp laceration 01/22/2019   • Esophageal dysphagia 01/22/2019   • Acute metabolic encephalopathy 08/29/2019   • Polypharmacy 08/29/2019   • Acute encephalopathy 08/31/2019   • Acute renal failure superimposed on stage 3 chronic kidney disease (CMS/HCC) 08/31/2019   • Urinary tract infection with hematuria 08/31/2019   • Overdose of drug/medicinal substance, intentional self-harm, initial encounter (CMS/HCC) 09/02/2019   • Escherichia coli infection 09/12/2019   • Retroperitoneal hemorrhage, small 09/12/2019   • Infiltrate of lower lobe of right lung present on CT 9/5/19, possible  pneumoina 09/12/2019   • Cough with hemoptysis 09/12/2019   • Acute on chronic respiratory failure with hypoxia and hypercapnia (CMS/[facility]) 11/10/2019   • Repeated falls 11/10/2019   • Bruise of both arms 11/10/2019   • Acute on chronic respiratory failure with hypercapnia (CMS/[facility]) 12/13/2019   • ARTIS (acute kidney injury) (CMS/[facility]) 12/26/2019   • Melena 12/27/2019   • Sinusitis 01/07/2020   • Hypotension 01/14/2020   • Esophageal dysphagia 09/18/2020   • Esophageal stricture 10/27/2020     Past Medical History:   Diagnosis Date   • Abnormal liver enzymes    • Arthritis    • BPH (benign prostatic hyperplasia)    • Cataract, bilateral    • Chest pain    • CHF (congestive heart failure) (CMS/[facility])    • COPD (chronic obstructive pulmonary disease) (CMS/[facility])    • Dyspnea    • Esophageal reflux    • Esophagitis    • Gastritis    • History of nuclear stress test    • History of peripheral neuropathy    • Hypertension    • Impaired functional mobility, balance, gait, and endurance    • Infectious diarrhea    • Nephrolithiasis    • Problems with swallowing    • Sleep apnea    • TIA (transient ischemic attack)    • Ventral hernia    • Wears glasses          PAST SURGICAL HISTORY  Past Surgical History:   Procedure Laterality Date   • CARDIAC CATHETERIZATION     • CARDIAC CATHETERIZATION N/A 5/24/2018    Procedure: Left Heart Cath;  Surgeon: Edouard Robertson MD;  Location:  [facility];  Service: Cardiovascular   • CARDIAC ELECTROPHYSIOLOGY PROCEDURE N/A 1/31/2019    Procedure: PACEMAKER IMPLANTATION- DC;  Surgeon: Omar Encinas DO;  Location:  [facility];  Service: Cardiology   • COLONOSCOPY N/A 12/28/2019    Procedure: COLONOSCOPY WITH HOT POLYPECTOMY;  Surgeon: Celio Maldonado MD;  [facility]:  [facility];  Service: Gastroenterology   • ENDOSCOPY N/A 1/26/2019    Procedure: ESOPHAGOGASTRODUODENOSCOPY;  Surgeon: Brunner, Mark I, MD;  Location:  [facility];  Service: Gastroenterology    • ENDOSCOPY N/A 2019    Procedure: ESOPHAGOGASTRODUODENOSCOPY;  Surgeon: Valdemar Mcdonald MD;  Location: [facility] ENDOSCOPY;  Service: General   • ENDOSCOPY N/A 10/20/2020    Procedure: ESOPHAGOGASTRODUODENOSCOPY WITH DILATION AND COLD FORCEP BIOPSY;  Surgeon: Cale Madrid MD;  Location: [facility] ENDOSCOPY;  Service: Gastroenterology;  Laterality: N/A;   • HERNIA REPAIR     • ORTHOPEDIC SURGERY Left     Left hip arthroplasty   • PACEMAKER IMPLANTATION  2019   • SUPRAPUBIC CATHETER INSERTION      History of Percutaneous Catheter Placement Into Ureter   • THROAT SURGERY      FOR SLEEP APNEA   • VSD REPAIR      History of VSD Repair By Patch Closure         FAMILY HISTORY  Family History   Problem Relation Age of Onset   • Other Father         CABG   • Coronary artery disease Father    • Colon cancer Neg Hx          SOCIAL HISTORY  Social History     Socioeconomic History   • Marital status:      Spouse name: Not on file   • Number of children: Not on file   • Years of education: Not on file   • Highest education level: Not on file   Occupational History     Employer: RETIRED   Tobacco Use   • Smoking status: Former Smoker     Packs/day: 3.00     Years: 30.00     Pack years: 90.00     Types: Cigarettes     Quit date:      Years since quittin.0   • Smokeless tobacco: Never Used   Substance and Sexual Activity   • Alcohol use: No   • Drug use: No   • Sexual activity: Defer         ALLERGIES  Levaquin [levofloxacin] and Sulfa antibiotics        REVIEW OF SYSTEMS  Review of Systems   Constitutional: Negative for activity change, appetite change, chills, fatigue and fever.   HENT: Negative for congestion, rhinorrhea and sore throat.    Respiratory: Negative for cough, shortness of breath and stridor.    Cardiovascular: Negative for chest pain and palpitations.   Gastrointestinal: Negative for abdominal pain, blood in stool, nausea and vomiting.   Genitourinary: Negative for difficulty  urinating, dysuria, frequency and hematuria.   Musculoskeletal: Negative for back pain and myalgias.   Neurological: Negative for dizziness, seizures, syncope and headaches.   All other systems reviewed and are negative.       All systems reviewed and negative except for those discussed in HPI.       PHYSICAL EXAM    I have reviewed the triage vital signs and nursing notes.    ED Triage Vitals [01/20/21 0944]   Temp Heart Rate Resp BP SpO2   98.8 °F (37.1 °C) 90 26 147/79 100 %      Temp src Heart Rate Source Patient Position BP Location FiO2 (%)   Oral Monitor Sitting -- --       Physical Exam  GENERAL:   Pleasant awake alert and oriented conversant not toxic appearing afebrile, not in acute distress.  HENT: Nares patent.  EYES: No scleral icterus.  CV: Regular rhythm, regular rate.  RESPIRATORY: Normal effort.  No audible wheezes, rales or rhonchi.  ABDOMEN: Soft, protuberant with bruising to the periumbilical area, upper abdomen, particularly left upper quadrant with extensive bruising and hematoma formation.  Moderate tenderness palpation directly over hematoma and ecchymosis.  No guarding or rebound tenderness.  Bowel sounds are normal.  MUSCULOSKELETAL: No deformities.   NEURO: Alert, moves all extremities, follows commands.  SKIN: Warm, dry, no rash visualized.        LAB RESULTS  Recent Results (from the past 24 hour(s))   Urinalysis With Microscopic If Indicated (No Culture) - Urine, Clean Catch    Collection Time: 01/20/21 10:34 AM    Specimen: Urine, Clean Catch   Result Value Ref Range    Color, UA Yellow Yellow, Straw    Appearance, UA Cloudy (A) Clear    pH, UA <=5.0 5.0 - 8.0    Specific Gravity, UA 1.017 1.001 - 1.030    Glucose, UA Negative Negative    Ketones, UA Negative Negative    Bilirubin, UA Negative Negative    Blood, UA Small (1+) (A) Negative    Protein, UA Trace (A) Negative    Leuk Esterase, UA Large (3+) (A) Negative    Nitrite, UA Negative Negative    Urobilinogen, UA 0.2 E.U./dL 0.2 -  1.0 E.U./dL   Urinalysis, Microscopic Only - Urine, Clean Catch    Collection Time: 01/20/21 10:34 AM    Specimen: Urine, Clean Catch   Result Value Ref Range    RBC, UA 3-6 (A) None Seen, 0-2 /HPF    WBC, UA Too Numerous to Count (A) None Seen, 0-2 /HPF    Bacteria, UA 3+ (A) None Seen, Trace /HPF    Squamous Epithelial Cells, UA None Seen None Seen, 0-2 /HPF    Hyaline Casts, UA 0-6 0 - 6 /LPF    Methodology Manual Light Microscopy    Comprehensive Metabolic Panel    Collection Time: 01/20/21 10:41 AM    Specimen: Blood   Result Value Ref Range    Glucose 118 (H) 65 - 99 mg/dL    BUN 41 (H) 8 - 23 mg/dL    Creatinine 1.75 (H) 0.76 - 1.27 mg/dL    Sodium 143 136 - 145 mmol/L    Potassium 4.6 3.5 - 5.2 mmol/L    Chloride 100 98 - 107 mmol/L    CO2 35.0 (H) 22.0 - 29.0 mmol/L    Calcium 8.9 8.6 - 10.5 mg/dL    Total Protein 6.9 6.0 - 8.5 g/dL    Albumin 4.20 3.50 - 5.20 g/dL    ALT (SGPT) 11 1 - 41 U/L    AST (SGOT) 21 1 - 40 U/L    Alkaline Phosphatase 66 39 - 117 U/L    Total Bilirubin 0.4 0.0 - 1.2 mg/dL    eGFR Non African Amer 39 (L) >60 mL/min/1.73    Globulin 2.7 gm/dL    A/G Ratio 1.6 g/dL    BUN/Creatinine Ratio 23.4 7.0 - 25.0    Anion Gap 8.0 5.0 - 15.0 mmol/L   Protime-INR    Collection Time: 01/20/21 10:41 AM    Specimen: Blood   Result Value Ref Range    Protime 12.9 11.5 - 14.0 Seconds    INR 1.00 0.85 - 1.16   CBC Auto Differential    Collection Time: 01/20/21 10:41 AM    Specimen: Blood   Result Value Ref Range    WBC 8.39 3.40 - 10.80 10*3/mm3    RBC 3.92 (L) 4.14 - 5.80 10*6/mm3    Hemoglobin 12.0 (L) 13.0 - 17.7 g/dL    Hematocrit 40.4 37.5 - 51.0 %    .1 (H) 79.0 - 97.0 fL    MCH 30.6 26.6 - 33.0 pg    MCHC 29.7 (L) 31.5 - 35.7 g/dL    RDW 13.3 12.3 - 15.4 %    RDW-SD 50.5 37.0 - 54.0 fl    MPV 8.9 6.0 - 12.0 fL    Platelets 198 140 - 450 10*3/mm3    Neutrophil % 74.9 42.7 - 76.0 %    Lymphocyte % 11.3 (L) 19.6 - 45.3 %    Monocyte % 10.4 5.0 - 12.0 %    Eosinophil % 2.6 0.3 - 6.2 %     Basophil % 0.6 0.0 - 1.5 %    Immature Grans % 0.2 0.0 - 0.5 %    Neutrophils, Absolute 6.28 1.70 - 7.00 10*3/mm3    Lymphocytes, Absolute 0.95 0.70 - 3.10 10*3/mm3    Monocytes, Absolute 0.87 0.10 - 0.90 10*3/mm3    Eosinophils, Absolute 0.22 0.00 - 0.40 10*3/mm3    Basophils, Absolute 0.05 0.00 - 0.20 10*3/mm3    Immature Grans, Absolute 0.02 0.00 - 0.05 10*3/mm3    nRBC 0.0 0.0 - 0.2 /100 WBC       If labs were ordered, I independently reviewed the results.        RADIOLOGY  Ct Abdomen Pelvis Without Contrast    Result Date: 1/20/2021  EXAMINATION: CT ABDOMEN AND PELVIS WO CONTRAST-01/20/2021:  INDICATION: Fall, hematoma LUQ, abd distention, periumbilical bruising, hematoma, abdominal pain.  TECHNIQUE: Multiple axial CT imaging was obtained of the abdomen and pelvis without the administration of oral or intravenous contrast.  The radiation dose reduction device was turned on for each scan per the ALARA (As Low as Reasonably Achievable) protocol.  COMPARISON: 09/05/2019.  FINDINGS:  ABDOMEN: The lung bases are grossly clear. The liver is homogeneous in appearance. The spleen reveals calcification suggesting old healed granulomatous disease. Small stone in the gallbladder. The kidneys and adrenal glands are within normal limits. The abdominal portions of the gastrointestinal tract are within normal limits. Stool scattered throughout the colon. No evidence of obvious obstruction or gross free intraperitoneal air.  PELVIS: The pelvic organs are unremarkable. The pelvic portions of the gastrointestinal tract are within normal limits. No free fluid or free air. No abnormal mass or fluid collection is identified. There is a ureterocele identified at the right UVJ. No ureteral calcification identified. No hydronephrosis seen of either kidney.  The pelvic organs reveal mild small diverticulum identified throughout the bladder. No wall thickening. No pelvic adenopathy. No free fluid or free air. Degenerative changes seen  within the spine and pelvis.      Small ureterocele identified at the right UVJ with several bladder diverticulum identified. Small stone in the gallbladder. The remainder of the abdomen and pelvis is grossly unremarkable.  D:  01/20/2021 E:  01/20/2021  This report was finalized on 1/20/2021 4:15 PM by Dr. Nikki Wright MD.      Xr Chest 1 View    Result Date: 1/20/2021  EXAMINATION: XR CHEST 1 VW-01/20/2021:  INDICATION: Fall, abdominal swelling, bruising.  COMPARISON: 01/03/2020.  FINDINGS: Portable chest reveals the heart to be enlarged. Underlying chronic and emphysematous changes seen within the lung fields bilaterally. Degenerative changes seen within the spine. Pacer leads in satisfactory position.      Chronic changes seen within the lung fields. The heart is borderline enlarged. No acute parenchymal disease.  D:  01/20/2021 E:  01/20/2021  This report was finalized on 1/20/2021 4:15 PM by Dr. Nikki Wright MD.              PROCEDURES    Procedures    No orders to display       MEDICATIONS GIVEN IN ER    Medications   HYDROmorphone (DILAUDID) injection 0.25 mg (0.25 mg Intravenous Given 1/20/21 1032)   ondansetron (ZOFRAN) injection 4 mg (4 mg Intravenous Given 1/20/21 1031)   cefTRIAXone (ROCEPHIN) 1 g/100 mL 0.9% NS (MBP) (0 g Intravenous Stopped 1/20/21 1423)         PROGRESS, DATA ANALYSIS, CONSULTS, AND MEDICAL DECISION MAKING    All labs have been independently reviewed by me.  All radiology studies have been reviewed by me and the radiologist dictating the report.   EKG's have been independently viewed and interpreted by me.            ED Course as of Jan 20 1756 Wed Jan 20, 2021   1254 Bacteria, UA(!): 3+ [TG]   1254 WBC, UA(!): Too Numerous to Count [TG]      ED Course User Index  [TG] Bucky Jones PA-C       Patient remained stable throughout course of stay in emergency department.  No intra-abdominal acute findings per radiology.  Will discharge to home with symptomatic care,  low threshold to return if any change or worsening of symptoms.      AS OF 17:56 EST VITALS:    BP - 155/80  HR - 81  TEMP - 98.8 °F (37.1 °C) (Oral)  O2 SATS - 96%        DIAGNOSIS  Final diagnoses:   Contusion of abdominal wall, initial encounter   Abdominal wall hematoma, initial encounter   Bacteriuria         DISPOSITION  DISCHARGE    Patient discharged in stable condition.    Reviewed implications of results, diagnosis, meds, responsibility to follow up, warning signs and symptoms of possible worsening, potential complications and reasons to return to ER.    Patient/Family voiced understanding of above instructions.    Discussed plan for discharge, as there is no emergent indication for admission.  Pt/family is agreeable and understands need for follow up and possible repeat testing.  Pt/family is aware that discharge does not mean that nothing is wrong but that it indicates no emergency is currently present that requires admission and they must continue care with follow-up as given below or with a physician of their choice.     FOLLOW-UP  Miguel Rojas MD  [address] [place] [address] [phone]    Call today  To schedule follow-up telehealth visit         Medication List      New Prescriptions    cefdinir 300 MG capsule  Commonly known as: OMNICEF  Take 1 capsule by mouth 2 (Two) Times a Day.           Where to Get Your Medications      These medications were sent to [facility] - [place], [place] - [address] [place] [address] - [phone]  - [phone] [address] [address]    Phone: [phone]   · cefdinir 300 MG capsule                  Bucky Jones PA-C  01/20/21 9488

## 2021-01-20 NOTE — DISCHARGE INSTRUCTIONS
Cold compresses to sore areas every few hours for 20 to 30 minutes today, switch over to warm compresses in a similar fashion tomorrow afternoon.  Follow-up with your primary care provider's office today, call their office to advise them of your visit and schedule follow-up telehealth or in person visit.  Return to the emergency department immediately if you have any change or worsening of symptoms.

## 2021-01-28 NOTE — TELEPHONE ENCOUNTER
Spoke with patient, he apologized for missing his appointment on the 20th but I notified him it was okay due to him being hospitalized. I rescheduled him for 2/1/21

## 2021-02-01 NOTE — PROGRESS NOTES
Subjective   Jani Pena is a 70 y.o. male.     Chief Complaint   Patient presents with   • Hospital Follow Up Visit     having trouble with 02 levels- reports levels 70% and below when walking with  5 liters of oxygen; 90-92% when sitting with 5 liters       History of Present Illness video visit  Patient here for follow-up.  Recently fell on the left waist area causing hematoma.  Patient went to emergency room.  Patient states pain is much improved and now.  Patient still has short of breath with exertion.  Occasional desaturation due to exertion.  Sitting with oxygen patient has no problem.  Patient also has some esophageal dysmotility patient is seeing GI doctor.  Blood pressure is elevated also.  Patient still blood pressure medications.  Recent UTI needs to be followed up.  Patient also had UTI in the hospital.    Current Outpatient Medications:   •  acetaminophen (TYLENOL) 500 MG tablet, Take 1 tablet by mouth Every 6 (Six) Hours As Needed for Mild Pain ., Disp: 45 tablet, Rfl: 1  •  alfuzosin (UROXATRAL) 10 MG 24 hr tablet, @@ TAKE ONE TABLET BY MOUTH DAILY, Disp: 90 tablet, Rfl: 3  •  ALPRAZolam (Xanax) 0.25 MG tablet, Take 1 tablet by mouth 2 (Two) Times a Day As Needed for Anxiety., Disp: 30 tablet, Rfl: 1  •  amLODIPine (NORVASC) 5 MG tablet, 1 and 1/2 tab daily po, Disp: 45 tablet, Rfl: 3  •  Aspirin Low Dose 81 MG chewable tablet, @@ TAKE ONE TABLET BY MOUTH DAILY, Disp: 90 tablet, Rfl: 3  •  atorvastatin (LIPITOR) 10 MG tablet, Take 1 tablet by mouth Every Evening., Disp: 90 tablet, Rfl: 3  •  benzonatate (TESSALON) 200 MG capsule, TAKE ONE CAPSULE BY MOUTH THREE TIMES A DAY AS NEEDED FOR COUGH, Disp: 45 capsule, Rfl: 0  •  carboxymethylcellulose (REFRESH PLUS) 0.5 % solution, 1 drop 3 (Three) Times a Day As Needed for Dry Eyes., Disp: , Rfl:   •  clopidogrel (PLAVIX) 75 MG tablet, TAKE ONE TABLET BY MOUTH DAILY, Disp: 90 tablet, Rfl: 3  •  cyclobenzaprine (FLEXERIL) 5 MG tablet, Take 5 mg by  mouth 3 (Three) Times a Day As Needed for Muscle Spasms., Disp: , Rfl:   •  docusate sodium (COLACE) 100 MG capsule, Take 1 capsule by mouth 2 (Two) Times a Day As Needed for Constipation., Disp: 100 capsule, Rfl: 3  •  escitalopram (LEXAPRO) 20 MG tablet, Take 20 mg by mouth Daily., Disp: , Rfl:   •  ferrous sulfate 325 (65 Fe) MG tablet, TAKE ONE TABLET BY MOUTH DAILY, Disp: 28 tablet, Rfl: 5  •  finasteride (PROSCAR) 5 MG tablet, Take 5 mg by mouth Daily., Disp: , Rfl:   •  Fluticasone-Umeclidin-Vilant (Trelegy Ellipta) 100-62.5-25 MCG/INH aerosol powder , Inhale 1 puff Daily., Disp: 1 each, Rfl: 6  •  furosemide (LASIX) 20 MG tablet, TAKE ONE TABLET BY MOUTH DAILY, Disp: 28 tablet, Rfl: 1  •  ipratropium (Atrovent) 0.06 % nasal spray, 2 sprays into the nostril(s) as directed by provider 4 (Four) Times a Day., Disp: 15 mL, Rfl: 1  •  ipratropium-albuterol (DUO-NEB) 0.5-2.5 mg/3 ml nebulizer, Take 3 mL by nebulization Every 4 (Four) Hours As Needed for Wheezing., Disp: 360 mL, Rfl: 6  •  mirtazapine (REMERON) 15 MG tablet, TAKE 1 TABLET BY MOUTH DAILY AT BEDTIME, Disp: 90 tablet, Rfl: 3  •  O2 (OXYGEN), Inhale 4 L/min 1 (One) Time. Walking on 4L and sitting 3L, Disp: , Rfl:   •  oxybutynin (DITROPAN) 5 MG tablet, OAKE 1 TABLET BY MOUTH DAILY AT BEDTIME, Disp: 90 tablet, Rfl: 3  •  pantoprazole (PROTONIX) 40 MG EC tablet, TAKE ONE TABLET BY MOUTH DAILY, Disp: 90 tablet, Rfl: 3  •  polyethylene glycol (MIRALAX) 17 g packet, Take 17 g by mouth Daily., Disp: 30 each, Rfl: 11  •  potassium chloride 10 MEQ CR tablet, TAKE ONE TABLET BY MOUTH DAILY, Disp: 28 tablet, Rfl: 1  •  predniSONE (DELTASONE) 10 MG tablet, 6 tab daily for 4 days 5 tab daily for 4 days 4 tab daily for 4 days 3 tab daily for 4 days 2 tab daily cont, Disp: 100 tablet, Rfl: 0  •  promethazine (PHENERGAN) 25 MG tablet, TAKE 1 TABLET BY MOUTH EVERY 6 HOURS AS NEEDED FOR NAUSEA AND VOMITING, Disp: 15 tablet, Rfl: 0  •  tetracycline (ACHROMYCIN,SUMYCIN)  500 MG capsule, 1 bid po with meals, Disp: 14 capsule, Rfl: 0  •  cefdinir (OMNICEF) 300 MG capsule, Take 1 capsule by mouth 2 (Two) Times a Day., Disp: 14 capsule, Rfl: 0  •  fluocinonide (LIDEX) 0.05 % cream, Apply  topically to the appropriate area as directed 2 (Two) Times a Day., Disp: , Rfl:   •  HYDROcodone-acetaminophen (NORCO) 5-325 MG per tablet, TAKE 1 TABLET BY MOUTH EVERY 8 HOURS AS NEEDED FOR SEVERE PAIN, Disp: 45 tablet, Rfl: 0  •  hydrocortisone-eucerin, Apply  topically to the appropriate area as directed., Disp: , Rfl:   •  lidocaine (LIDODERM) 5 %, PLACE ONE PATCH ON SKIN AS DIRECTED,REMOVE & DISCARD PATCH WITHIN 12 HOURS, Disp: 30 patch, Rfl: 3  •  magnesium hydroxide (MILK OF MAGNESIA) 400 MG/5ML suspension, Take 15 mL by mouth Daily As Needed for Constipation., Disp: 354 mL, Rfl: 0  •  melatonin 5 MG tablet tablet, @@ TAKE 1 TABLET BY MOUTH EVERY EVENING, Disp: 90 tablet, Rfl: 3  •  methylPREDNISolone (MEDROL) 4 MG dose pack, Take as directed on package instructions., Disp: 21 tablet, Rfl: 0  •  traZODone (DESYREL) 50 MG tablet, TAKE 1 TABLET BY MOUTH EVERY EVENING, Disp: 90 tablet, Rfl: 3    The following portions of the patient's history were reviewed and updated as appropriate: allergies, current medications, past family history, past medical history, past social history, past surgical history and problem list.    Review of Systems   Constitutional: Negative.    Respiratory: Positive for shortness of breath.    Cardiovascular: Negative.    Gastrointestinal: Negative.    Musculoskeletal: Negative.    Skin: Negative.    Neurological: Negative.    Psychiatric/Behavioral: Negative.        Objective   Physical Exam  Pulmonary:      Effort: Pulmonary effort is normal.   Neurological:      Mental Status: He is alert. Mental status is at baseline.         All tests have been reviewed.    Assessment/Plan   Diagnoses and all orders for this visit:    Urinary tract infection without hematuria, site  unspecified  -     Urinalysis With Microscopic If Indicated (No Culture) - Urine, Clean Catch    Essential hypertension increase amlodipine to 7.5 mg daily  -     Comprehensive Metabolic Panel  -     TSH  -     amLODIPine (NORVASC) 5 MG tablet; 1 and 1/2 tab daily po    Hematoma continue to watch to blood tests  -     CBC & Differential    Esophageal dysphagia pending motility test    Abnormal urinalysis repeat          1 mo video     You have chosen to receive care through a telehealth visit.  Do you consent to use a video/audio connection for your medical care today? Yes      Individuals involved in this encounter:    GI Reddy Dr.

## 2021-02-15 NOTE — TELEPHONE ENCOUNTER
----- Message from PAL Desir sent at 2/15/2021  8:38 AM EST -----  Just call him and reschedule the follow up for the end of March so that we have time to get the results back.   ----- Message -----  From: Tomasa Davila MA  Sent: 2/12/2021   2:13 PM EST  To: PAL Desir    Patient has FUP appt with you on 03/1/21, but his esophageal motility study is scheduled for 03/16.

## 2021-03-01 NOTE — TELEPHONE ENCOUNTER
Patient called and states he is needing a refill on his Breo inhaler. I do see in his chart that he is supposed to be on Trelegy but he informed me he does not have that inhaler and takes Breo.

## 2021-03-02 NOTE — PROGRESS NOTES
Subjective   Jani Pena is a 70 y.o. male.     Chief Complaint   Patient presents with   • COPD     pt not breathing well.  Oxygen only at 94.  Pt is experiencing back pain as well.       History of Present Illness   Patient here for follow-up.  Baseline short of breath gradually getting worse slowly.  Patient does have end-stage emphysema.  Oxygen on 5 L 94%.  Patient has some mild back pain.  Urinary tract infection resolved after medication.  Hypertension stable on medication.  Weight gain recently more than 10 pound.  Anemia stable.  Chronic kidney disease creatinine still high.  Hematoma in the right arm resolved.  Dysphagia pending motility test.    Current Outpatient Medications:   •  Acetaminophen Extra Strength 500 MG tablet, TAKE 1 TABLET BY MOUTH EVERY 6 HOURS AS NEEDED FOR MILD PAIN, Disp: 45 tablet, Rfl: 1  •  alfuzosin (UROXATRAL) 10 MG 24 hr tablet, @@ TAKE ONE TABLET BY MOUTH DAILY, Disp: 90 tablet, Rfl: 3  •  ALPRAZolam (XANAX) 0.25 MG tablet, TAKE ONE TABLET BY MOUTH TWO TIMES A DAY AS NEEDED, Disp: 30 tablet, Rfl: 1  •  amLODIPine (NORVASC) 5 MG tablet, 1 and 1/2 tab daily po, Disp: 45 tablet, Rfl: 3  •  Aspirin Low Dose 81 MG chewable tablet, @@ TAKE ONE TABLET BY MOUTH DAILY, Disp: 90 tablet, Rfl: 3  •  atorvastatin (LIPITOR) 10 MG tablet, Take 1 tablet by mouth Every Evening., Disp: 90 tablet, Rfl: 3  •  carboxymethylcellulose (REFRESH PLUS) 0.5 % solution, 1 drop 3 (Three) Times a Day As Needed for Dry Eyes., Disp: , Rfl:   •  clopidogrel (PLAVIX) 75 MG tablet, TAKE ONE TABLET BY MOUTH DAILY, Disp: 90 tablet, Rfl: 3  •  cyclobenzaprine (FLEXERIL) 5 MG tablet, Take 5 mg by mouth 3 (Three) Times a Day As Needed for Muscle Spasms., Disp: , Rfl:   •  docusate sodium (COLACE) 100 MG capsule, Take 1 capsule by mouth 2 (Two) Times a Day As Needed for Constipation., Disp: 100 capsule, Rfl: 3  •  escitalopram (LEXAPRO) 20 MG tablet, Take 20 mg by mouth Daily., Disp: , Rfl:   •  ferrous sulfate  325 (65 Fe) MG tablet, TAKE ONE TABLET BY MOUTH DAILY, Disp: 28 tablet, Rfl: 5  •  finasteride (PROSCAR) 5 MG tablet, Take 5 mg by mouth Daily., Disp: , Rfl:   •  fluocinonide (LIDEX) 0.05 % cream, Apply  topically to the appropriate area as directed 2 (Two) Times a Day., Disp: , Rfl:   •  Fluticasone-Umeclidin-Vilant (Trelegy Ellipta) 100-62.5-25 MCG/INH aerosol powder , Inhale 1 puff Daily., Disp: 1 each, Rfl: 3  •  furosemide (LASIX) 20 MG tablet, TAKE ONE TABLET BY MOUTH DAILY, Disp: 28 tablet, Rfl: 1  •  HYDROcodone-acetaminophen (NORCO) 5-325 MG per tablet, TAKE 1 TABLET BY MOUTH EVERY 8 HOURS AS NEEDED FOR SEVERE PAIN, Disp: 45 tablet, Rfl: 0  •  hydrocortisone-eucerin, Apply  topically to the appropriate area as directed., Disp: , Rfl:   •  ipratropium (Atrovent) 0.06 % nasal spray, 2 sprays into the nostril(s) as directed by provider 4 (Four) Times a Day., Disp: 15 mL, Rfl: 1  •  ipratropium-albuterol (DUO-NEB) 0.5-2.5 mg/3 ml nebulizer, Take 3 mL by nebulization Every 4 (Four) Hours As Needed for Wheezing., Disp: 360 mL, Rfl: 6  •  lidocaine (LIDODERM) 5 %, PLACE ONE PATCH ON SKIN AS DIRECTED,REMOVE & DISCARD PATCH WITHIN 12 HOURS, Disp: 30 patch, Rfl: 3  •  magnesium hydroxide (MILK OF MAGNESIA) 400 MG/5ML suspension, Take 15 mL by mouth Daily As Needed for Constipation., Disp: 354 mL, Rfl: 0  •  melatonin 5 MG tablet tablet, @@ TAKE 1 TABLET BY MOUTH EVERY EVENING, Disp: 90 tablet, Rfl: 3  •  mirtazapine (REMERON) 15 MG tablet, TAKE 1 TABLET BY MOUTH DAILY AT BEDTIME, Disp: 90 tablet, Rfl: 3  •  O2 (OXYGEN), Inhale 4 L/min 1 (One) Time. Walking on 4L and sitting 3L, Disp: , Rfl:   •  oxybutynin (DITROPAN) 5 MG tablet, OAKE 1 TABLET BY MOUTH DAILY AT BEDTIME, Disp: 90 tablet, Rfl: 3  •  pantoprazole (PROTONIX) 40 MG EC tablet, TAKE ONE TABLET BY MOUTH DAILY, Disp: 90 tablet, Rfl: 3  •  polyethylene glycol (MIRALAX) 17 g packet, Take 17 g by mouth Daily., Disp: 30 each, Rfl: 11  •  potassium chloride 10  MEQ CR tablet, TAKE ONE TABLET BY MOUTH DAILY, Disp: 28 tablet, Rfl: 1  •  predniSONE (DELTASONE) 10 MG tablet, 6 tab daily for 4 days 5 tab daily for 4 days 4 tab daily for 4 days 3 tab daily for 4 days 2 tab daily cont, Disp: 100 tablet, Rfl: 0  •  promethazine (PHENERGAN) 25 MG tablet, TAKE 1 TABLET BY MOUTH EVERY 6 HOURS AS NEEDED FOR NAUSEA AND VOMITING, Disp: 15 tablet, Rfl: 0  •  tetracycline (ACHROMYCIN,SUMYCIN) 500 MG capsule, 1 bid po with meals, Disp: 14 capsule, Rfl: 0  •  traZODone (DESYREL) 50 MG tablet, TAKE 1 TABLET BY MOUTH EVERY EVENING, Disp: 90 tablet, Rfl: 3  •  benzonatate (TESSALON) 200 MG capsule, TAKE ONE CAPSULE BY MOUTH THREE TIMES A DAY AS NEEDED FOR COUGH, Disp: 45 capsule, Rfl: 0    The following portions of the patient's history were reviewed and updated as appropriate: allergies, current medications, past family history, past medical history, past social history, past surgical history and problem list.    Review of Systems   Constitutional: Negative.    HENT: Negative.    Eyes: Negative.    Respiratory: Positive for shortness of breath and wheezing.    Cardiovascular: Negative.    Gastrointestinal: Negative.         Dysphagia   Endocrine: Negative.    Genitourinary: Negative.    Musculoskeletal: Positive for back pain.   Skin: Negative.    Allergic/Immunologic: Negative.    Neurological: Negative.    Hematological: Negative.    Psychiatric/Behavioral: Negative.    All other systems reviewed and are negative.      Objective   Physical Exam  HENT:      Head: Normocephalic.   Neck:      Musculoskeletal: Neck supple.   Cardiovascular:      Rate and Rhythm: Normal rate and regular rhythm.      Heart sounds: Normal heart sounds.   Pulmonary:      Breath sounds: Wheezing ( Baseline) present.   Abdominal:      General: Bowel sounds are normal.   Musculoskeletal:         General: No tenderness.   Skin:     General: Skin is warm.   Neurological:      Mental Status: He is alert and oriented to  person, place, and time.   Psychiatric:         Mood and Affect: Mood normal.         All tests have been reviewed.    Assessment/Plan   There are no diagnoses linked to this encounter.          Urinary tract infection without hematuria, site unspecified, resolved after med  Essential hypertension increase amlodipine to 7.5 mg daily cont    Low back pain Tylenol for now    Anemia, watch    CKD follow renal     Hematoma resolving     Esophageal dysphagia pending motility test     Weight gain. Increase lasix 40mg daily for one week then return to 20mg daily     End-stage of COPD:   Patient has a mobility limitation that significantly impairs his ability to participate in one or more related activity of daily living such as:: Walking with short distance and handling with his ADL     A cane or walker have been ruled out     The use of a wheelchair will significantly improve patient's ability to participate in ADL's and with patient will use it on a regular basis within the home       1 months wellness check

## 2021-03-02 NOTE — TELEPHONE ENCOUNTER
[facility] CALLED STATING THEY NEED AN ORDER FOR THE PTS MED CHANGE    FOR LASIX 40MG FOR TEN DAYS     CALL BACK NUMBER: [phone]  FAX NUMBER: [phone]  ATTENTION: CHAN

## 2021-03-10 NOTE — TELEPHONE ENCOUNTER
Spoke with pt and advised I sent an order to Dr. Rojas for the wheelchair. Once he signs the order, we will fax it to the company that handles this type of DME and someone will call him. Advised we will let him know something in a few days.

## 2021-03-11 NOTE — TELEPHONE ENCOUNTER
You have placed the order but patient's last office note needs verbiage to support equipment.     Examples of verbiage:    Patient has a mobility limitation that significantly impairs his ability to participate in one or more related activity of daily living such as:: (this must be stated and explained with a due to indication)    A cane or walker have been ruled out    The use of a wheelchair will significantly improve patient's ability to participate in ADL's and with patient will use it on a regular basis within the home        Please add verbiage and I will fax order, office note, and patient's demographics to DME company

## 2021-03-15 PROBLEM — J96.00 ACUTE RESPIRATORY FAILURE (HCC): Status: ACTIVE | Noted: 2021-01-01

## 2021-03-15 PROBLEM — E66.3 OVERWEIGHT (BMI 25.0-29.9): Status: ACTIVE | Noted: 2021-01-01

## 2021-03-15 PROBLEM — J96.22 ACUTE ON CHRONIC RESPIRATORY FAILURE WITH HYPERCAPNIA (HCC): Status: ACTIVE | Noted: 2021-01-01

## 2021-03-15 NOTE — ED PROVIDER NOTES
Subjective   Mr. Pena presents to the emergency department with complaint of hemoptysis.  He goes on to say that he has had unusually worse dyspnea with exertion and severe fatigue for approximately 2 to 3 weeks.  Mr. Pena has severe COPD and is oxygen dependent on 5 L by nasal cannula at home where he resides at [facility].  He coughs on a regular basis but noticed hemoptysis this morning.  He takes Plavix and aspirin only.    Mr. Pena past medical history includes severe COPD with oxygen dependence, as aforementioned.  He has coronary artery disease with history of atrial fibrillation.  His only anticoagulation is currently aspirin and Plavix.  He has had urinary tract infection with hospitalization and sepsis in the last several months.  His last occasion of pneumonia, to his recollection was about 1 year ago.  Patient is anticipating esophageal manometry procedure tomorrow at [facility] under the supervision of Dr. Tinsley.  Patient has longtime history of difficulty swallowing although he states this was recently improved with a esophageal dilatation performed in the last 3 months.  He is on prednisone on a regular basis.  He denies any recent falls.    Mr. Pena denies any chest pain or any physical pain whatsoever.  He states he has been eating, drinking, and voiding well.  He denies any recent dysuria although he does have urinary frequency chronically.      History provided by:  Patient   used: No    Shortness of Breath  Severity:  Mild  Onset quality:  Sudden  Duration: Noticed this morning.  Progression:  Worsening  Chronicity:  New  Relieved by:  Nothing  Worsened by:  Nothing  Ineffective treatments:  None tried  Associated symptoms: cough and sputum production    Associated symptoms: no abdominal pain, no chest pain, no fever, no neck pain, no syncope and no vomiting        Review of Systems   Constitutional: Negative.  Negative for fever.    Eyes: Negative.    Respiratory: Positive for cough, sputum production and shortness of breath. Negative for choking.    Cardiovascular: Negative for chest pain, palpitations, leg swelling and syncope.   Gastrointestinal: Negative for abdominal pain and vomiting.   Endocrine: Negative.    Musculoskeletal: Negative.  Negative for neck pain.   Skin: Negative for pallor.   Allergic/Immunologic: Positive for immunocompromised state (on chornic steroid use ).   Hematological: Bruises/bleeds easily (on asa and plavix ).   Psychiatric/Behavioral: Negative.    All other systems reviewed and are negative.      Past Medical History:   Diagnosis Date   • Abnormal liver enzymes    • Anemia    • Anxiety    • Arthritis    • Atherosclerotic heart disease of native coronary artery without angina pectoris    • Atrial fibrillation (CMS/HCC)    • Back pain    • BPH (benign prostatic hyperplasia)    • Cataract, bilateral    • Chest pain     2-3 MONTHS AGO.  PER PT, HAS ADDRESSED WITH CARDIOLOGIST.  STATES HAS NTG PRN.   • CHF (congestive heart failure) (CMS/HCC)    • Chronic bronchitis (CMS/HCC)    • Chronic kidney disease    • COPD (chronic obstructive pulmonary disease) (CMS/HCC)    • Degeneration of cervical intervertebral disc    • Depression    • Diverticulosis    • Dyspnea    • Esophageal reflux    • Esophagitis    • Gastritis    • Hematuria    • Hemorrhoids    • Hiatal hernia    • History of arthritis    • History of nuclear stress test     UNSURE OF DATE   • History of peripheral neuropathy    • History of ventricular septal defect    • History of ventricular septal defect    • Hyperlipidemia    • Hypertension    • Impaired functional mobility, balance, gait, and endurance    • Infectious diarrhea    • Kyphosis, acquired    • Lumbar radiculopathy    • Mitral and aortic valve disease    • Nephrolithiasis    • Phimosis    • Problems with swallowing     WITH FOOD   • Respiratory failure (CMS/HCC)    • Sleep apnea     BIPAP, too  awkward to bring   • TIA (transient ischemic attack)     X3.   2014   • Ventral hernia    • Wears glasses     FOR READING       Allergies   Allergen Reactions   • Levaquin [Levofloxacin] Hives   • Sulfa Antibiotics Nausea And Vomiting       Past Surgical History:   Procedure Laterality Date   • CARDIAC CATHETERIZATION     • CARDIAC CATHETERIZATION N/A 5/24/2018    Procedure: Left Heart Cath;  Surgeon: Edouard Robertson MD;  Location:  [facility];  Service: Cardiovascular   • CARDIAC ELECTROPHYSIOLOGY PROCEDURE N/A 1/31/2019    Procedure: PACEMAKER IMPLANTATION- DC;  Surgeon: Omar Encinas DO;  Location:  [facility] EP INVASIVE LOCATION;  Service: Cardiology   • COLONOSCOPY N/A 12/28/2019    Procedure: COLONOSCOPY WITH HOT POLYPECTOMY;  Surgeon: Celio Maldonado MD;  Location: [facility] ENDOSCOPY;  Service: Gastroenterology   • ENDOSCOPY N/A 1/26/2019    Procedure: ESOPHAGOGASTRODUODENOSCOPY;  Surgeon: Brunner, Mark I, MD;  Location:  [facility] ENDOSCOPY;  Service: Gastroenterology   • ENDOSCOPY N/A 12/27/2019    Procedure: ESOPHAGOGASTRODUODENOSCOPY;  Surgeon: Valdemar Mcdonald MD;  Location: [facility] ENDOSCOPY;  Service: General   • ENDOSCOPY N/A 10/20/2020    Procedure: ESOPHAGOGASTRODUODENOSCOPY WITH DILATION AND COLD FORCEP BIOPSY;  Surgeon: Cale Madrid MD;  Location: [facility] ENDOSCOPY;  Service: Gastroenterology;  Laterality: N/A;   • HERNIA REPAIR     • ORTHOPEDIC SURGERY Left     Left hip arthroplasty   • PACEMAKER IMPLANTATION  01/31/2019   • SUPRAPUBIC CATHETER INSERTION      History of Percutaneous Catheter Placement Into Ureter   • THROAT SURGERY      FOR SLEEP APNEA   • VSD REPAIR      History of VSD Repair By Patch Closure       Family History   Problem Relation Age of Onset   • Other Father         CABG   • Coronary artery disease Father    • Colon cancer Neg Hx        Social History     Socioeconomic History   • Marital status:      Spouse name: Not on file   • Number of children:  Not on file   • Years of education: Not on file   • Highest education level: Not on file   Tobacco Use   • Smoking status: Former Smoker     Packs/day: 3.00     Years: 30.00     Pack years: 90.00     Types: Cigarettes     Quit date:      Years since quittin.2   • Smokeless tobacco: Never Used   Vaping Use   • Vaping Use: Never used   Substance and Sexual Activity   • Alcohol use: No   • Drug use: No   • Sexual activity: Defer           Objective   Physical Exam  Vitals and nursing note reviewed.   Constitutional:       General: He is not in acute distress.     Appearance: Normal appearance. He is not ill-appearing.   HENT:      Head: Normocephalic and atraumatic.      Right Ear: External ear normal.      Left Ear: External ear normal.      Nose: Nose normal.      Mouth/Throat:      Mouth: Mucous membranes are moist.      Pharynx: No oropharyngeal exudate.   Eyes:      Conjunctiva/sclera: Conjunctivae normal.   Cardiovascular:      Rate and Rhythm: Regular rhythm. Bradycardia present.      Heart sounds: No murmur.      Comments: 60s     Pulmonary:      Effort: Pulmonary effort is normal. No respiratory distress.      Breath sounds: Normal breath sounds.   Abdominal:      General: Bowel sounds are normal. There is no distension.      Palpations: Abdomen is soft.      Tenderness: There is no abdominal tenderness.   Musculoskeletal:         General: No swelling, tenderness or deformity (1+). Normal range of motion.      Cervical back: Normal range of motion and neck supple. No muscular tenderness.      Right lower leg: Edema present.      Left lower leg: Left lower leg edema: 1+   Skin:     General: Skin is warm and dry.      Capillary Refill: Capillary refill takes less than 2 seconds.      Coloration: Skin is not pale.      Findings: No lesion.   Neurological:      General: No focal deficit present.      Mental Status: He is alert and oriented to person, place, and time.      Comments: No Neurosensory deficit  or focal weakness     Psychiatric:         Behavior: Behavior normal.         Thought Content: Thought content normal.         Procedures           ED Course  ED Course as of Mar 15 1245   Mon Mar 15, 2021   0927 EKG interpreted by me: Ventricular paced rhythm, normal rate, no further interpretation, this is an abnormal EKG    [MP]   1005 Troponin T(!!): 0.065 [MS]   1005 proBNP(!): 2,693.0 [MS]   1005 BUN(!): 40 [MS]   1005 Creatinine(!): 2.09 [MS]   1005 eGFR Non  Am(!): 32 [MS]   1054 HCO3, Arterial(!): 38.0 [MS]   1054 WBC, UA(!): 13-20 [MS]   1054 Leukocytes, UA(!): Small (1+) [MS]   1157 Patient's work-up is remarkable for acute hypoxia alongside urinary tract infection and chronic renal insufficiency.  Patient was on 5 L of oxygen by nasal cannula and on more than one occasion while assisting himself to standing this minimal exertion caused his oxygen saturations to drop to the 60s with difficult recovery.  Now on BiPAP at 40%, his vital signs are normal and oxygenating well.    [MS]   1200 Urine culture is now pending.  Antibiotics chosen based on the urine culture of December 2020.    [MS]      ED Course User Index  [MP] Edouard Durán MD  [MS] Hanna Silva, PAL      Recent Results (from the past 24 hour(s))   Light Blue Top    Collection Time: 03/15/21  8:33 AM   Result Value Ref Range    Extra Tube hold for add-on    Green Top (Gel)    Collection Time: 03/15/21  8:33 AM   Result Value Ref Range    Extra Tube Hold for add-ons.    Lavender Top    Collection Time: 03/15/21  8:33 AM   Result Value Ref Range    Extra Tube hold for add-on    Gold Top - SST    Collection Time: 03/15/21  8:33 AM   Result Value Ref Range    Extra Tube Hold for add-ons.    Comprehensive Metabolic Panel    Collection Time: 03/15/21  8:33 AM    Specimen: Blood   Result Value Ref Range    Glucose 133 (H) 65 - 99 mg/dL    BUN 40 (H) 8 - 23 mg/dL    Creatinine 2.09 (H) 0.76 - 1.27 mg/dL    Sodium 143 136 - 145  mmol/L    Potassium 4.3 3.5 - 5.2 mmol/L    Chloride 95 (L) 98 - 107 mmol/L    CO2 38.2 (H) 22.0 - 29.0 mmol/L    Calcium 9.4 8.6 - 10.5 mg/dL    Total Protein 6.8 6.0 - 8.5 g/dL    Albumin 4.60 3.50 - 5.20 g/dL    ALT (SGPT) 10 1 - 41 U/L    AST (SGOT) 18 1 - 40 U/L    Alkaline Phosphatase 62 39 - 117 U/L    Total Bilirubin 0.8 0.0 - 1.2 mg/dL    eGFR Non African Amer 32 (L) >60 mL/min/1.73    Globulin 2.2 gm/dL    A/G Ratio 2.1 g/dL    BUN/Creatinine Ratio 19.1 7.0 - 25.0    Anion Gap 9.8 5.0 - 15.0 mmol/L   Lipase    Collection Time: 03/15/21  8:33 AM    Specimen: Blood   Result Value Ref Range    Lipase 38 13 - 60 U/L   BNP    Collection Time: 03/15/21  8:33 AM    Specimen: Blood   Result Value Ref Range    proBNP 2,693.0 (H) 0.0 - 900.0 pg/mL   Troponin    Collection Time: 03/15/21  8:33 AM    Specimen: Blood   Result Value Ref Range    Troponin T 0.065 (C) 0.000 - 0.030 ng/mL   Procalcitonin    Collection Time: 03/15/21  8:33 AM    Specimen: Blood   Result Value Ref Range    Procalcitonin 0.08 0.00 - 0.25 ng/mL   CK    Collection Time: 03/15/21  8:33 AM    Specimen: Blood   Result Value Ref Range    Creatine Kinase 77 20 - 200 U/L   Magnesium    Collection Time: 03/15/21  8:33 AM    Specimen: Blood   Result Value Ref Range    Magnesium 2.4 1.6 - 2.4 mg/dL   CBC Auto Differential    Collection Time: 03/15/21  8:33 AM    Specimen: Blood   Result Value Ref Range    WBC 7.36 3.40 - 10.80 10*3/mm3    RBC 4.25 4.14 - 5.80 10*6/mm3    Hemoglobin 12.9 (L) 13.0 - 17.7 g/dL    Hematocrit 41.7 37.5 - 51.0 %    MCV 98.1 (H) 79.0 - 97.0 fL    MCH 30.4 26.6 - 33.0 pg    MCHC 30.9 (L) 31.5 - 35.7 g/dL    RDW 12.4 12.3 - 15.4 %    RDW-SD 45.0 37.0 - 54.0 fl    MPV 8.9 6.0 - 12.0 fL    Platelets 202 140 - 450 10*3/mm3    Neutrophil % 66.0 42.7 - 76.0 %    Lymphocyte % 19.7 19.6 - 45.3 %    Monocyte % 10.9 5.0 - 12.0 %    Eosinophil % 2.6 0.3 - 6.2 %    Basophil % 0.5 0.0 - 1.5 %    Immature Grans % 0.3 0.0 - 0.5 %     Neutrophils, Absolute 4.86 1.70 - 7.00 10*3/mm3    Lymphocytes, Absolute 1.45 0.70 - 3.10 10*3/mm3    Monocytes, Absolute 0.80 0.10 - 0.90 10*3/mm3    Eosinophils, Absolute 0.19 0.00 - 0.40 10*3/mm3    Basophils, Absolute 0.04 0.00 - 0.20 10*3/mm3    Immature Grans, Absolute 0.02 0.00 - 0.05 10*3/mm3    nRBC 0.0 0.0 - 0.2 /100 WBC   Lactic Acid, Plasma    Collection Time: 03/15/21  9:23 AM    Specimen: Blood   Result Value Ref Range    Lactate 1.5 0.5 - 2.0 mmol/L   Urinalysis With Microscopic If Indicated (No Culture) - Urine, Clean Catch    Collection Time: 03/15/21 10:07 AM    Specimen: Urine, Clean Catch   Result Value Ref Range    Color, UA Yellow Yellow, Straw    Appearance, UA Clear Clear    pH, UA 6.5 5.0 - 8.0    Specific Gravity, UA 1.014 1.005 - 1.030    Glucose, UA Negative Negative    Ketones, UA Negative Negative    Bilirubin, UA Negative Negative    Blood, UA Negative Negative    Protein, UA Negative Negative    Leuk Esterase, UA Small (1+) (A) Negative    Nitrite, UA Negative Negative    Urobilinogen, UA 0.2 E.U./dL 0.2 - 1.0 E.U./dL   COVID-19,[facility] IN-HOUSE,Nasal Swab No Transport Media 3-4 HR TAT - Swab, Nasal Cavity    Collection Time: 03/15/21 10:07 AM    Specimen: Nasal Cavity; Swab   Result Value Ref Range    COVID19 Not Detected Not Detected - Ref. Range   Urinalysis, Microscopic Only - Urine, Clean Catch    Collection Time: 03/15/21 10:07 AM    Specimen: Urine, Clean Catch   Result Value Ref Range    RBC, UA 0-2 (A) None Seen /HPF    WBC, UA 13-20 (A) None Seen /HPF    Bacteria, UA Trace (A) None Seen /HPF    Squamous Epithelial Cells, UA 0-2 None Seen, 0-2 /HPF    Hyaline Casts, UA None Seen None Seen /LPF    WBC Clumps, UA Small/1+ None Seen /HPF    Methodology Manual Light Microscopy    Blood Gas, Arterial With Co-Ox    Collection Time: 03/15/21 10:36 AM    Specimen: Arterial Blood   Result Value Ref Range    Site Left Brachial     Mynor's Test N/A     pH, Arterial 7.336 (L) 7.350 -  7.450 pH units    pCO2, Arterial 71.1 (C) 35.0 - 45.0 mm Hg    pO2, Arterial 70.8 (L) 75.0 - 100.0 mm Hg    HCO3, Arterial 38.0 (H) 22.0 - 28.0 mmol/L    Base Excess, Arterial 9.6 (H) 0.0 - 2.0 mmol/L    O2 Saturation, Arterial 93.9 (L) 94.0 - 100.0 %    Hematocrit, Blood Gas 37.0 %    Oxyhemoglobin 92.1 (L) 94 - 99 %    Methemoglobin 0.90 0.00 - 1.50 %    Carboxyhemoglobin 1.0 0 - 2 %    A-a Gradiant 408.5 mmHg    Barometric Pressure for Blood Gas 738 mmHg    Modality NRB     FIO2 80 %    Ventilator Mode NA     Note      pH, Temp Corrected      pCO2, Temperature Corrected      pO2, Temperature Corrected       Note: In addition to lab results from this visit, the labs listed above may include labs taken at another facility or during a different encounter within the last 24 hours. Please correlate lab times with ED admission and discharge times for further clarification of the services performed during this visit.    CT Chest Pulmonary Embolism   Preliminary Result   No evidence of pulmonary embolus or dissection.                700.58 mGy.cm           This study was performed with techniques to keep radiation doses as low   as reasonably achievable (ALARA). Individualized dose reduction   techniques using automated exposure control or adjustment of mA and/or   kV according to the patient size were employed.            PROCEDURE:  CT ABDOMEN PELVIS W CONTRAST-       HISTORY:  PE suspected, low/intermediate prob, neg D-dimer       COMPARISON:  None .       TECHNIQUE: Multiple axial CT images were obtained from the lung bases   through the pubic symphysis following the administration of Isovue 300   and oral contrast.        FINDINGS:        ABDOMEN: The liver shows fatty infiltration. There is a stone versus   polyp in the gallbladder. The spleen is unremarkable. No adrenal mass is   present.  The pancreas is normal. A left renal artery stent is present.   The kidneys are normal. The aorta is normal in caliber. There  is no free   fluid or adenopathy. There is colonic diverticulosis without evidence of   acute diverticulitis.       PELVIS: The appendix is normal.  There is diffuse wall thickening of the   urinary bladder which may be due to chronic obstruction or neurogenic   bladder. There is no significant free fluid or adenopathy. There are   postsurgical changes of left hip arthroplasty.       IMPRESSION:   1. Diffuse wall thickening of the urinary bladder may be due to chronic   obstruction or neurogenic bladder.   2. Fatty infiltration of the liver.   3. Stone versus polyp in the gallbladder.       700.58 mGy.cm           This study was performed with techniques to keep radiation doses as low   as reasonably achievable (ALARA). Individualized dose reduction   techniques using automated exposure control or adjustment of mA and/or   kV according to the patient size were employed.            Images were reviewed, interpreted, and dictated by Dr. Richelle Patel M.D.   Transcribed by Ariela Batista PA-C.      CT Abdomen Pelvis With Contrast   Preliminary Result   No evidence of pulmonary embolus or dissection.                700.58 mGy.cm           This study was performed with techniques to keep radiation doses as low   as reasonably achievable (ALARA). Individualized dose reduction   techniques using automated exposure control or adjustment of mA and/or   kV according to the patient size were employed.            PROCEDURE:  CT ABDOMEN PELVIS W CONTRAST-       HISTORY:  PE suspected, low/intermediate prob, neg D-dimer       COMPARISON:  None .       TECHNIQUE: Multiple axial CT images were obtained from the lung bases   through the pubic symphysis following the administration of Isovue 300   and oral contrast.        FINDINGS:        ABDOMEN: The liver shows fatty infiltration. There is a stone versus   polyp in the gallbladder. The spleen is unremarkable. No adrenal mass is   present.  The pancreas is normal. A left  renal artery stent is present.   The kidneys are normal. The aorta is normal in caliber. There is no free   fluid or adenopathy. There is colonic diverticulosis without evidence of   acute diverticulitis.       PELVIS: The appendix is normal.  There is diffuse wall thickening of the   urinary bladder which may be due to chronic obstruction or neurogenic   bladder. There is no significant free fluid or adenopathy. There are   postsurgical changes of left hip arthroplasty.       IMPRESSION:   1. Diffuse wall thickening of the urinary bladder may be due to chronic   obstruction or neurogenic bladder.   2. Fatty infiltration of the liver.   3. Stone versus polyp in the gallbladder.       700.58 mGy.cm           This study was performed with techniques to keep radiation doses as low   as reasonably achievable (ALARA). Individualized dose reduction   techniques using automated exposure control or adjustment of mA and/or   kV according to the patient size were employed.            Images were reviewed, interpreted, and dictated by Dr. Richelle Patel M.D.   Transcribed by Ariela Batista PA-C.        Vitals:    03/15/21 1006 03/15/21 1015 03/15/21 1019 03/15/21 1200   BP:    119/75   BP Location:       Patient Position:       Pulse:  89 78 72   Resp:    20   Temp:       TempSrc:       SpO2: 93% (!) 60% 99% 96%   Weight:       Height:         Medications   sodium chloride 0.9 % flush 10 mL (has no administration in time range)   sodium chloride 0.9 % flush 10 mL (has no administration in time range)   methylPREDNISolone sodium succinate (SOLU-Medrol) injection 125 mg (has no administration in time range)   sodium chloride 0.9 % bolus 500 mL (0 mL Intravenous Stopped 3/15/21 1052)   iopamidol (ISOVUE-300) 61 % injection 100 mL (100 mL Intravenous Given 3/15/21 0953)   cefTRIAXone (ROCEPHIN) IVPB 1 g/50ml dextrose (premix) (0 g Intravenous Stopped 3/15/21 1241)     ECG/EMG Results (last 24 hours)     ** No results found  for the last 24 hours. **        ECG 12 Lead    (Results Pending)   ECG 12 Lead    (Results Pending)                                            MDM    Final diagnoses:   Acute respiratory failure, unspecified whether with hypoxia or hypercapnia (CMS/[facility])   Chronic renal impairment, unspecified CKD stage   Urinary tract infection without hematuria, site unspecified   History of COPD   Oxygen dependent     ADMITTED        Hanna Silva, APRN  03/15/21 8220

## 2021-03-15 NOTE — PLAN OF CARE
Goal Outcome Evaluation:  Plan of Care Reviewed With: patient  Progress: no change   Currently on oxygen at 5 liters pnc.  States he will go back on bipap after drinking some liquids.  Denies pain at this time.

## 2021-03-15 NOTE — H&P
[facility]   HISTORY AND PHYSICAL          Name:  Jani Pena   Age:  70 y.o.  Sex:  male  :  1950  MRN:  [id number]   Visit Number:  [id number]  Admission Date:  3/15/2021  Date Of Service:  03/15/21  Primary Care Physician:  Miguel Rojas MD    Chief Complaint:     COPD with exacerbation     History Of Presenting Illness:    This is a 70-year-old male who looks older than stated age with past medical history significant for atrial fibrillation, chronic kidney disease, gastroesophageal reflux, chronic hypoxic respiratory failure, chronic hypercapnic respiratory failure, sick sinus syndrome status post pacemaker placement, who presented to the emergency room with complaints of shortness of breath and hemoptysis.  According to the patient had been feeling poorly over the last several days.  He has been more fatigued and weak.  According to the patient he did have an episode of hemoptysis.  It was one episode and stated that it was about nickel size.    He does have a history of COPD and hypercapnic respiratory failure for which he is followed by pulmonology as an outpatient.  He is also not has a known history of atrial fibrillation and coronary artery disease however the only antiplatelet and anticoagulation he is on his aspirin and Plavix.  I do not see a clear reason why he is not on anticoagulation.    According to the patient he also was supposed to have an esophageal manometry procedure tomorrow at [facility] in [place] under the supervision of Dr. Tinsley.  He has a longtime history of difficulty swallowing but states that he had had a recent esophageal dilatation performed in the last few months which is helped greatly.  He states that he can eat ground foods and soft foods without any difficulty.    Upon evaluation in the emergency room he did have a CT scan of the chest which showed no evidence of pulmonary embolism or dissection.  There was no evidence of  pneumonia.  He did have emphysematous changes noted.  He does have evidence of prior granulomatous disease.  CT scan of his abdomen was also done which did not show any acute findings.  He did have some diffuse wall thickening of the urinary bladder which I felt could be chronic or neurogenic bladder as well as stone versus polyp in the gallbladder and fatty liver.  Covid testing done in the emergency room was negative.  He did have an ABG done in the ER which did show a pH of 7.33, PCO2 of 71.1, PO2 of 70.8 and a bicarb of 38.  UA done in ER shows possible urinary tract infection for which he got a dose of Rocephin.      He did have a mildly elevated troponin at 0.065 which is likely secondary to demand ischemia we will trend the troponins and monitor and should they elevated even further then consider getting cardiology consult.  BNP was mildly elevated at 2693 however this is actually around his baseline.  Troponin is mildly up from previous which was 0.027.  I will get a repeat troponin and continue to trend.  Again if it continues to elevate we will get cardiology consult.    He was started on IV antibiotics in the form of Rocephin and azithromycin as well as IV steroids and admitted to the hospitalist service.  Patient was started on BiPAP in the emergency room.  We will get a repeat ABG at this time.      Review Of Systems:     General ROS: Patient denies any fevers, chills or loss of consciousness.  Fatigue and weak  ENT ROS: Denies sore throat, nasal congestion or ear pain.   Respiratory ROS: cough and shortness of breath.  hemoptysis   Cardiovascular ROS: Denies chest pain or palpitations. No history of exertional chest pain.   Gastrointestinal ROS: Denies nausea and vomiting. Denies any abdominal pain. No diarrhea.   Genito-Urinary ROS: Denies dysuria or hematuria.  Musculoskeletal ROS: Denies pain or weakness   Neurological ROS: Denies any focal weakness. No loss of consciousness. Denies any numbness.  Denies neck pain.   Dermatological ROS: Denies any redness or pruritis.     Past Medical History:    Past Medical History:   Diagnosis Date   • Abnormal liver enzymes    • Anemia    • Anxiety    • Arthritis    • Atherosclerotic heart disease of native coronary artery without angina pectoris    • Atrial fibrillation (CMS/HCC)    • Back pain    • BPH (benign prostatic hyperplasia)    • Cataract, bilateral    • Chest pain     2-3 MONTHS AGO.  PER PT, HAS ADDRESSED WITH CARDIOLOGIST.  STATES HAS NTG PRN.   • CHF (congestive heart failure) (CMS/HCC)    • Chronic bronchitis (CMS/HCC)    • Chronic kidney disease    • COPD (chronic obstructive pulmonary disease) (CMS/HCC)    • Degeneration of cervical intervertebral disc    • Depression    • Diverticulosis    • Dyspnea    • Esophageal reflux    • Esophagitis    • Gastritis    • Hematuria    • Hemorrhoids    • Hiatal hernia    • History of arthritis    • History of nuclear stress test     UNSURE OF DATE   • History of peripheral neuropathy    • History of ventricular septal defect    • History of ventricular septal defect    • Hyperlipidemia    • Hypertension    • Impaired functional mobility, balance, gait, and endurance    • Infectious diarrhea    • Kyphosis, acquired    • Lumbar radiculopathy    • Mitral and aortic valve disease    • Nephrolithiasis    • Phimosis    • Problems with swallowing     WITH FOOD   • Respiratory failure (CMS/HCC)    • Sleep apnea     BIPAP, too awkward to bring   • TIA (transient ischemic attack)     X3.   2014   • Ventral hernia    • Wears glasses     FOR READING       Past Surgical history:    Past Surgical History:   Procedure Laterality Date   • CARDIAC CATHETERIZATION     • CARDIAC CATHETERIZATION N/A 5/24/2018    Procedure: Left Heart Cath;  Surgeon: Edouard Robertson MD;  Location: [facility] INVASIVE LOCATION;  Service: Cardiovascular   • CARDIAC ELECTROPHYSIOLOGY PROCEDURE N/A 1/31/2019    Procedure: PACEMAKER IMPLANTATION- DC;  Surgeon:  Omar Encinas DO;  Location:  [facility] INVASIVE LOCATION;  Service: Cardiology   • COLONOSCOPY N/A 12/28/2019    Procedure: COLONOSCOPY WITH HOT POLYPECTOMY;  Surgeon: Celio Maldonado MD;  Location: [facility] ENDOSCOPY;  Service: Gastroenterology   • ENDOSCOPY N/A 1/26/2019    Procedure: ESOPHAGOGASTRODUODENOSCOPY;  Surgeon: Brunner, Mark I, MD;  Location:  [facility];  Service: Gastroenterology   • ENDOSCOPY N/A 12/27/2019    Procedure: ESOPHAGOGASTRODUODENOSCOPY;  Surgeon: Valdemar Mcdonald MD;  Location: [facility] ENDOSCOPY;  Service: General   • ENDOSCOPY N/A 10/20/2020    Procedure: ESOPHAGOGASTRODUODENOSCOPY WITH DILATION AND COLD FORCEP BIOPSY;  Surgeon: Cale Madrid MD;  Location: [facility] ENDOSCOPY;  Service: Gastroenterology;  Laterality: N/A;   • HERNIA REPAIR     • ORTHOPEDIC SURGERY Left     Left hip arthroplasty   • PACEMAKER IMPLANTATION  01/31/2019   • SUPRAPUBIC CATHETER INSERTION      History of Percutaneous Catheter Placement Into Ureter   • THROAT SURGERY      FOR SLEEP APNEA   • VSD REPAIR      History of VSD Repair By Patch Closure       Social History:  Former smoker who quit in 2017, , on 5 liters of oxygen and Bipap at .        Family History:    Family History   Problem Relation Age of Onset   • Other Father         CABG   • Coronary artery disease Father    • Colon cancer Neg Hx        Allergies:      Levaquin [levofloxacin] and Sulfa antibiotics    Home Medications:    Prior to Admission Medications     Prescriptions Last Dose Informant Patient Reported? Taking?    alfuzosin (UROXATRAL) 10 MG 24 hr tablet 3/15/2021  No Yes    @@ TAKE ONE TABLET BY MOUTH DAILY    amLODIPine (NORVASC) 5 MG tablet 3/15/2021  No Yes    1 and 1/2 tab daily po    Aspirin Low Dose 81 MG chewable tablet 3/15/2021  No Yes    @@ TAKE ONE TABLET BY MOUTH DAILY    atorvastatin (LIPITOR) 10 MG tablet 3/14/2021  No Yes    Take 1 tablet by mouth Every Evening.    clopidogrel (PLAVIX) 75 MG tablet  3/15/2021  No Yes    TAKE ONE TABLET BY MOUTH DAILY    escitalopram (LEXAPRO) 20 MG tablet 3/15/2021  Yes Yes    Take 20 mg by mouth Daily.    ferrous sulfate 325 (65 Fe) MG tablet 3/15/2021  No Yes    TAKE ONE TABLET BY MOUTH DAILY    finasteride (PROSCAR) 5 MG tablet 3/15/2021  Yes Yes    Take 5 mg by mouth Daily.    fluocinonide (LIDEX) 0.05 % cream 3/15/2021  Yes Yes    Apply  topically to the appropriate area as directed 2 (Two) Times a Day.    Fluticasone-Umeclidin-Vilant (Trelegy Ellipta) 100-62.5-25 MCG/INH aerosol powder  3/15/2021  No Yes    Inhale 1 puff Daily.    furosemide (LASIX) 20 MG tablet 3/15/2021  No Yes    TAKE ONE TABLET BY MOUTH DAILY    hydrocortisone 1 % cream 3/15/2021 Nursing Home Yes Yes    Apply 1 application topically to the appropriate area as directed 2 (Two) Times a Day.    ipratropium (Atrovent) 0.06 % nasal spray 3/15/2021  No Yes    2 sprays into the nostril(s) as directed by provider 4 (Four) Times a Day.    ipratropium-albuterol (DUO-NEB) 0.5-2.5 mg/3 ml nebulizer 3/15/2021  No Yes    Take 3 mL by nebulization Every 4 (Four) Hours As Needed for Wheezing.    lidocaine (LIDODERM) 5 % 3/15/2021  No Yes    PLACE ONE PATCH ON SKIN AS DIRECTED,REMOVE & DISCARD PATCH WITHIN 12 HOURS    melatonin 5 MG tablet tablet 3/14/2021  No Yes    @@ TAKE 1 TABLET BY MOUTH EVERY EVENING    mirtazapine (REMERON) 15 MG tablet 3/15/2021  No Yes    TAKE 1 TABLET BY MOUTH DAILY AT BEDTIME    oxybutynin (DITROPAN) 5 MG tablet 3/14/2021  No Yes    OAKE 1 TABLET BY MOUTH DAILY AT BEDTIME    pantoprazole (PROTONIX) 40 MG EC tablet 3/15/2021  No Yes    TAKE ONE TABLET BY MOUTH DAILY    polyethylene glycol (MIRALAX) 17 g packet 3/15/2021  No Yes    Take 17 g by mouth Daily.    potassium chloride 10 MEQ CR tablet 3/15/2021  No Yes    TAKE ONE TABLET BY MOUTH DAILY    predniSONE (DELTASONE) 10 MG tablet 3/15/2021  No Yes    TAKE ONE TABLET BY MOUTH DAILY    traZODone (DESYREL) 50 MG tablet 3/14/2021  No Yes     TAKE 1 TABLET BY MOUTH EVERY EVENING    Acetaminophen Extra Strength 500 MG tablet Unknown  No No    TAKE 1 TABLET BY MOUTH EVERY 6 HOURS AS NEEDED FOR MILD PAIN    ALPRAZolam (XANAX) 0.25 MG tablet Unknown  No No    TAKE ONE TABLET BY MOUTH TWO TIMES A DAY AS NEEDED    benzonatate (TESSALON) 200 MG capsule Unknown  No No    TAKE ONE CAPSULE BY MOUTH THREE TIMES A DAY AS NEEDED FOR COUGH    Patient taking differently:  Take 200 mg by mouth 3 (Three) Times a Day As Needed.    carboxymethylcellulose (REFRESH PLUS) 0.5 % solution Unknown  Yes No    1 drop 3 (Three) Times a Day As Needed for Dry Eyes.    cyclobenzaprine (FLEXERIL) 5 MG tablet Unknown  Yes No    Take 5 mg by mouth 3 (Three) Times a Day As Needed for Muscle Spasms.    docusate sodium (COLACE) 100 MG capsule Unknown  No No    Take 1 capsule by mouth 2 (Two) Times a Day As Needed for Constipation.    HYDROcodone-acetaminophen (NORCO) 5-325 MG per tablet Unknown  No No    TAKE 1 TABLET BY MOUTH EVERY 8 HOURS AS NEEDED FOR SEVERE PAIN    hydrocortisone-eucerin   Yes No    Apply  topically to the appropriate area as directed.    magnesium hydroxide (MILK OF MAGNESIA) 400 MG/5ML suspension   No No    Take 15 mL by mouth Daily As Needed for Constipation.    O2 (OXYGEN)  Self Yes No    Inhale 4 L/min 1 (One) Time. Walking on 4L and sitting 3L    promethazine (PHENERGAN) 25 MG tablet   No No    TAKE 1 TABLET BY MOUTH EVERY 6 HOURS AS NEEDED FOR NAUSEA AND VOMITING    tetracycline (ACHROMYCIN,SUMYCIN) 500 MG capsule   No No    1 bid po with meals             ED Medications:    Medications   sodium chloride 0.9 % flush 10 mL (has no administration in time range)   sodium chloride 0.9 % flush 10 mL (has no administration in time range)   ipratropium-albuterol (DUO-NEB) nebulizer solution 3 mL (has no administration in time range)   methylPREDNISolone sodium succinate (SOLU-Medrol) injection 40 mg (has no administration in time range)   sodium chloride 0.9 % flush 3  mL (has no administration in time range)   sodium chloride 0.9 % flush 3-10 mL (has no administration in time range)   acetaminophen (TYLENOL) tablet 650 mg (has no administration in time range)     Or   acetaminophen (TYLENOL) 160 MG/5ML solution 650 mg (has no administration in time range)     Or   acetaminophen (TYLENOL) suppository 650 mg (has no administration in time range)   ondansetron (ZOFRAN) injection 4 mg (has no administration in time range)   famotidine (PEPCID) tablet 40 mg (has no administration in time range)   tamsulosin (FLOMAX) 24 hr capsule 0.4 mg (has no administration in time range)   amLODIPine (NORVASC) tablet 5 mg (has no administration in time range)   aspirin chewable tablet 81 mg (has no administration in time range)   atorvastatin (LIPITOR) tablet 10 mg (has no administration in time range)   clopidogrel (PLAVIX) tablet 75 mg (has no administration in time range)   docusate sodium (COLACE) capsule 100 mg (has no administration in time range)   escitalopram (LEXAPRO) tablet 10 mg (has no administration in time range)   ferrous sulfate EC tablet 324 mg (has no administration in time range)   finasteride (PROSCAR) tablet 5 mg (has no administration in time range)   furosemide (LASIX) tablet 20 mg (has no administration in time range)   HYDROcodone-acetaminophen (NORCO) 5-325 MG per tablet 1 tablet (has no administration in time range)   lidocaine (LIDODERM) 5 % 1 patch (has no administration in time range)   oxybutynin (DITROPAN) tablet 5 mg (has no administration in time range)   potassium chloride (MICRO-K) CR capsule 10 mEq (has no administration in time range)   traZODone (DESYREL) tablet 50 mg (has no administration in time range)   cefTRIAXone (ROCEPHIN) IVPB 1 g/50ml dextrose (premix) (has no administration in time range)   AZITHROMYCIN 500 MG/250 ML 0.9% NS IVPB (vial-mate) (has no administration in time range)   sodium chloride 0.9 % bolus 500 mL (0 mL Intravenous Stopped 3/15/21  1052)   iopamidol (ISOVUE-300) 61 % injection 100 mL (100 mL Intravenous Given 3/15/21 0953)   cefTRIAXone (ROCEPHIN) IVPB 1 g/50ml dextrose (premix) (0 g Intravenous Stopped 3/15/21 1241)   methylPREDNISolone sodium succinate (SOLU-Medrol) injection 125 mg (125 mg Intravenous Given 3/15/21 1309)       Vital Signs:    Temp:  [97.9 °F (36.6 °C)-98.4 °F (36.9 °C)] 97.9 °F (36.6 °C)  Heart Rate:  [67-98] 67  Resp:  [20-22] 20  BP: (117-158)/(66-88) 119/66        03/15/21  0828 03/15/21  1436   Weight: 77.1 kg (170 lb) 79.7 kg (175 lb 9.6 oz)       Body mass index is 26.7 kg/m².    Physical Exam:      General Appearance:  Alert and cooperative, not in any acute distress.   Head:  Atraumatic and normocephalic, without obvious abnormality.   Eyes:          PERRLA, conjunctivae and sclerae normal, no Icterus. No pallor. Extraocular movements are within normal limits.           Neck: Supple, trachea midline, no thyromegaly, no carotid bruit.       Lungs:   Chest shape is normal. Breath sounds heard bilaterally equally.  few crackles no wheezing. Decreased air entry bilaterally.  No Pleural rub or bronchial breathing.   Heart:  Normal S1 and S2, no murmur, no gallop, no rub. No JVD pacemaker palpated right upper chest.   Abdomen:   Soft non tender.  Positive bowel sounds   Extremities: Moves all extremities well, trace edema, no cyanosis, no clubbing.   Pulses: Pulses palpable and equal bilaterally   Skin: No bleeding, bruising or rash       Neurologic: Cranial nerves 2 - 12 grossly intact, sensation intact, Motor power is normal and equal bilaterally.           Labs:    I have reviewed the labs done in the emergency room.  Results from last 7 days   Lab Units 03/15/21  0833   SODIUM mmol/L 143   POTASSIUM mmol/L 4.3   CHLORIDE mmol/L 95*   CO2 mmol/L 38.2*   BUN mg/dL 40*   CREATININE mg/dL 2.09*   CALCIUM mg/dL 9.4   BILIRUBIN mg/dL 0.8   ALK PHOS U/L 62   ALT (SGPT) U/L 10   AST (SGOT) U/L 18   GLUCOSE mg/dL 133*      Results from last 7 days   Lab Units 03/15/21  0833   WBC 10*3/mm3 7.36   HEMOGLOBIN g/dL 12.9*   HEMATOCRIT % 41.7   PLATELETS 10*3/mm3 202         Results from last 7 days   Lab Units 03/15/21  0833   CK TOTAL U/L 77   TROPONIN T ng/mL 0.065*     Results from last 7 days   Lab Units 03/15/21  0833   PROBNP pg/mL 2,693.0*         Results from last 7 days   Lab Units 03/15/21  0833   LIPASE U/L 38     Results from last 7 days   Lab Units 03/15/21  1036   PH, ARTERIAL pH units 7.336*   PO2 ART mm Hg 70.8*   PCO2, ARTERIAL mm Hg 71.1*   HCO3 ART mmol/L 38.0*     Results from last 7 days   Lab Units 03/15/21  1007   COLOR UA  Yellow   GLUCOSE UA  Negative   KETONES UA  Negative   LEUKOCYTES UA  Small (1+)*   PH, URINE  6.5   BILIRUBIN UA  Negative   UROBILINOGEN UA  0.2 E.U./dL     Pain Management Panel     Pain Management Panel Latest Ref Rng & Units 9/1/2019 8/29/2019    CREATININE UR mg/dL 90.1 -    AMPHETAMINES SCREEN, URINE Negative - Negative    BARBITURATES SCREEN Negative - Negative    BENZODIAZEPINE SCREEN, URINE Negative - Positive(A)    BUPRENORPHINEUR Negative - Negative    COCAINE SCREEN, URINE Negative - Negative    METHADONE SCREEN, URINE Negative - Negative    METHAMPHETAMINEUR Negative - Negative              Radiology:    Imaging Results (Last 72 Hours)     Procedure Component Value Units Date/Time    CT Chest Pulmonary Embolism [[id number]] Collected: 03/15/21 1036     Updated: 03/15/21 1528    Narrative:      PROCEDURE: CT CHEST PULMONARY EMBOLISM     HISTORY: PE suspected, low/intermediate prob, neg D-dimer     COMPARISON: December 2020.     TECHNIQUE: Multiple axial CT images were obtained from the thoracic  inlet through the upper abdomen following the administration of Isovue  300 per the CT PE protocol. Coronal and oblique 3D MIP images were  reconstructed from the original axial data set.      FINDINGS: There are no filling defects within the pulmonary arteries to  suggest an embolus. The  thoracic aorta is normal in caliber with no  evidence of dissection. The heart is enlarged. There are no pleural or  pericardial effusions. There is no adenopathy. There are emphysematous  changes. No focal opacities are identified. There is evidence of prior  granulomatous disease. Bone windows reveal no acute osseous  abnormalities.       Impression:      No evidence of pulmonary embolus or dissection.            700.58 mGy.cm        This study was performed with techniques to keep radiation doses as low  as reasonably achievable (ALARA). Individualized dose reduction  techniques using automated exposure control or adjustment of mA and/or  kV according to the patient size were employed.         PROCEDURE:  CT ABDOMEN PELVIS W CONTRAST-     HISTORY:  PE suspected, low/intermediate prob, neg D-dimer     COMPARISON:  None .     TECHNIQUE: Multiple axial CT images were obtained from the lung bases  through the pubic symphysis following the administration of Isovue 300  and oral contrast.      FINDINGS:      ABDOMEN: The liver shows fatty infiltration. There is a stone versus  polyp in the gallbladder. The spleen is unremarkable. No adrenal mass is  present.  The pancreas is normal. A left renal artery stent is present.  The kidneys are normal. The aorta is normal in caliber. There is no free  fluid or adenopathy. There is colonic diverticulosis without evidence of  acute diverticulitis.     PELVIS: The appendix is normal.  There is diffuse wall thickening of the  urinary bladder which may be due to chronic obstruction or neurogenic  bladder. There is no significant free fluid or adenopathy. There are  postsurgical changes of left hip arthroplasty.     IMPRESSION:  1. Diffuse wall thickening of the urinary bladder may be due to chronic  obstruction or neurogenic bladder.  2. Fatty infiltration of the liver.  3. Stone versus polyp in the gallbladder.     700.58 mGy.cm        This study was performed with techniques to  keep radiation doses as low  as reasonably achievable (ALARA). Individualized dose reduction  techniques using automated exposure control or adjustment of mA and/or  kV according to the patient size were employed.         Images were reviewed, interpreted, and dictated by Dr. Richelle Patel M.D.  Transcribed by Ariela Batista PA-C.     This report was finalized on 3/15/2021 3:26 PM by Richelle Patel M.D..    CT Abdomen Pelvis With Contrast [[id number]] Collected: 03/15/21 1036     Updated: 03/15/21 1528    Narrative:      PROCEDURE: CT CHEST PULMONARY EMBOLISM     HISTORY: PE suspected, low/intermediate prob, neg D-dimer     COMPARISON: December 2020.     TECHNIQUE: Multiple axial CT images were obtained from the thoracic  inlet through the upper abdomen following the administration of Isovue  300 per the CT PE protocol. Coronal and oblique 3D MIP images were  reconstructed from the original axial data set.      FINDINGS: There are no filling defects within the pulmonary arteries to  suggest an embolus. The thoracic aorta is normal in caliber with no  evidence of dissection. The heart is enlarged. There are no pleural or  pericardial effusions. There is no adenopathy. There are emphysematous  changes. No focal opacities are identified. There is evidence of prior  granulomatous disease. Bone windows reveal no acute osseous  abnormalities.       Impression:      No evidence of pulmonary embolus or dissection.            700.58 mGy.cm        This study was performed with techniques to keep radiation doses as low  as reasonably achievable (ALARA). Individualized dose reduction  techniques using automated exposure control or adjustment of mA and/or  kV according to the patient size were employed.         PROCEDURE:  CT ABDOMEN PELVIS W CONTRAST-     HISTORY:  PE suspected, low/intermediate prob, neg D-dimer     COMPARISON:  None .     TECHNIQUE: Multiple axial CT images were obtained from the lung  bases  through the pubic symphysis following the administration of Isovue 300  and oral contrast.      FINDINGS:      ABDOMEN: The liver shows fatty infiltration. There is a stone versus  polyp in the gallbladder. The spleen is unremarkable. No adrenal mass is  present.  The pancreas is normal. A left renal artery stent is present.  The kidneys are normal. The aorta is normal in caliber. There is no free  fluid or adenopathy. There is colonic diverticulosis without evidence of  acute diverticulitis.     PELVIS: The appendix is normal.  There is diffuse wall thickening of the  urinary bladder which may be due to chronic obstruction or neurogenic  bladder. There is no significant free fluid or adenopathy. There are  postsurgical changes of left hip arthroplasty.     IMPRESSION:  1. Diffuse wall thickening of the urinary bladder may be due to chronic  obstruction or neurogenic bladder.  2. Fatty infiltration of the liver.  3. Stone versus polyp in the gallbladder.     700.58 mGy.cm        This study was performed with techniques to keep radiation doses as low  as reasonably achievable (ALARA). Individualized dose reduction  techniques using automated exposure control or adjustment of mA and/or  kV according to the patient size were employed.         Images were reviewed, interpreted, and dictated by Dr. Richelle Patel M.D.  Transcribed by Ariela Batista PA-C.     This report was finalized on 3/15/2021 3:26 PM by Richelle Patel M.D..          Assessment/Plan:   1.  Acute on chronic hypoxic/hypercapnic respiratory failure-patient has been placed on BiPAP and oxygen.  He was dropped into the 60s on 5 L nasal cannula in the emergency room with any exertion.  A repeat ABG has been ordered.  He has been on BiPAP which will be continued.  Pulmonology has been consulted.  Repeat ABG is improving.  Continue Bipap and recheck in am.    2.  COPD with exacerbation-patient's been placed on antibiotics in the form of  Rocephin azithromycin, nebulized treatments, IV steroids.  CT scan of the chest did not show any evidence of pneumonia.  He did have emphysematous changes.    3.  Possible acute urinary tract infection present on admission-await urine culture results.  He is currently getting IV Rocephin.    4.  Elevated troponin possibly secondary to demand ischemia-Will trend troponins.  Will consult cardiology if any further elevation.  EKG done in the emergency room just showed a ventricular paced rhythm.  We will monitor on telemetry.    5.  Sick sinus syndrome status post pacemaker placement    6.  Acute on chronic kidney disease-patient's current creatinine is 2.09.  Will monitor and repeat creatinine in the morning.  Will give IV fluids but monitor closely for overload.    7.  Atrial fibrillation-patient is rate controlled.  I do not see any anticoagulation.  He is on Plavix and aspirin.    8.  Suspected esophageal dysmotility-patient had a esophageal stricture for which he had a dilatation a few months ago and has been doing better however he did have an appointment tomorrow for esophageal manometry by Dr. Tinsley at [facility] in [place].  This will need to be rescheduled upon discharge.  We will place him on dysphagia soft diet as he states he can eat this.    9.  Coronary artery disease-medical management          Advance Care Planning   ACP discussion was held with the patient during this visit. Patient has an advance directive in EMR which is still valid.    DNR    PAL Stern  03/15/21  16:31 EDT

## 2021-03-16 NOTE — CONSULTS
Date of consultation:   March 16, 2021    Requested by:   Hospitalist Service.     PCP: Miguel Rojas MD    Reason:  Shortness of breath.  Hemoptysis.?  Pneumonia    History of Present Illness:  70 y.o. male  with past medical history significant for sick sinus syndrome status post pacemaker placement, COPD, chronic hypoxemia, TIA and obstructive sleep apnea who started having a cough and shortness of breath, that started a few days ago. Patient was not complaining of subjective chills.  The patient denies any fever. Symptoms started somewhat gradually.  Over the past few days his symptoms have gradually worsened.    He actually had one coughing spell where he coughed up dark brownish to reddish sputum but since then he has not had any significant hemoptysis.  He says that the hemoptysis was minimal and was smaller than a quarter.    The patient says that the shortness of breath is worse even at rest.    he denies any sick contacts, although does live at a assisted living facility.     He mentions using his CPAP device on a regular basis.    The patient was admitted to the hospital and pulmonary consultation was requested for further recommendations.     Review of System: All other review of systems negative except indicated in HPI.  Positive for occasional lower extremity edema, palpitations, back pain and easy bruisability.    Past Medical History:  Past Medical History:   Diagnosis Date   • Abnormal liver enzymes    • Anemia    • Anxiety    • Arthritis    • Atherosclerotic heart disease of native coronary artery without angina pectoris    • Atrial fibrillation (CMS/HCC)    • Back pain    • BPH (benign prostatic hyperplasia)    • Cataract, bilateral    • Chest pain     2-3 MONTHS AGO.  PER PT, HAS ADDRESSED WITH CARDIOLOGIST.  STATES HAS NTG PRN.   • CHF (congestive heart failure) (CMS/HCC)    • Chronic bronchitis (CMS/HCC)    • Chronic kidney disease    • COPD (chronic obstructive pulmonary disease) (CMS/HCC)     • Degeneration of cervical intervertebral disc    • Depression    • Diverticulosis    • Dyspnea    • Esophageal reflux    • Esophagitis    • Gastritis    • Hematuria    • Hemorrhoids    • Hiatal hernia    • History of arthritis    • History of nuclear stress test     UNSURE OF DATE   • History of peripheral neuropathy    • History of ventricular septal defect    • History of ventricular septal defect    • Hyperlipidemia    • Hypertension    • Impaired functional mobility, balance, gait, and endurance    • Infectious diarrhea    • Kyphosis, acquired    • Lumbar radiculopathy    • Mitral and aortic valve disease    • Nephrolithiasis    • Phimosis    • Problems with swallowing     WITH FOOD   • Respiratory failure (CMS/HCC)    • Sleep apnea     BIPAP, too awkward to bring   • TIA (transient ischemic attack)     X3.   2014   • Ventral hernia    • Wears glasses     FOR READING         Past Surgical History:  Past Surgical History:   Procedure Laterality Date   • CARDIAC CATHETERIZATION     • CARDIAC CATHETERIZATION N/A 5/24/2018    Procedure: Left Heart Cath;  Surgeon: Edouard Robertson MD;  Location:  [facility];  Service: Cardiovascular   • CARDIAC ELECTROPHYSIOLOGY PROCEDURE N/A 1/31/2019    Procedure: PACEMAKER IMPLANTATION- DC;  Surgeon: Omar Encinas DO;  Location:  [facility]VAS[facility];  Service: Cardiology   • COLONOSCOPY N/A 12/28/2019    Procedure: COLONOSCOPY WITH HOT POLYPECTOMY;  Surgeon: Celio Maldonado MD;  Location: [facility] ENDOSCOPY;  Service: Gastroenterology   • ENDOSCOPY N/A 1/26/2019    Procedure: ESOPHAGOGASTRODUODENOSCOPY;  Surgeon: Brunner, Mark I, MD;  Location:  [facility] ENDOSCOPY;  Service: Gastroenterology   • ENDOSCOPY N/A 12/27/2019    Procedure: ESOPHAGOGASTRODUODENOSCOPY;  Surgeon: Valdemar Mcdonald MD;  Location: [facility] ENDOSCOPY;  Service: General   • ENDOSCOPY N/A 10/20/2020    Procedure: ESOPHAGOGASTRODUODENOSCOPY WITH DILATION AND COLD FORCEP BIOPSY;  Surgeon:  "Cale Madrid MD;  Location: [facility] ENDOSCOPY;  Service: Gastroenterology;  Laterality: N/A;   • HERNIA REPAIR     • ORTHOPEDIC SURGERY Left     Left hip arthroplasty   • PACEMAKER IMPLANTATION  2019   • SUPRAPUBIC CATHETER INSERTION      History of Percutaneous Catheter Placement Into Ureter   • THROAT SURGERY      FOR SLEEP APNEA   • VSD REPAIR      History of VSD Repair By Patch Closure         Family History:  Family History   Problem Relation Age of Onset   • Other Father         CABG   • Coronary artery disease Father    • Colon cancer Neg Hx          Social History:  Social History     Socioeconomic History   • Marital status:      Spouse name: Not on file   • Number of children: Not on file   • Years of education: Not on file   • Highest education level: Not on file   Tobacco Use   • Smoking status: Former Smoker     Packs/day: 3.00     Years: 30.00     Pack years: 90.00     Types: Cigarettes     Quit date:      Years since quittin.2   • Smokeless tobacco: Never Used   Vaping Use   • Vaping Use: Never used   Substance and Sexual Activity   • Alcohol use: No   • Drug use: No   • Sexual activity: Defer         Physical Exam:  /67 (BP Location: Left arm, Patient Position: Lying)   Pulse 84   Temp 97.6 °F (36.4 °C) (Oral)   Resp 18   Ht 172.7 cm (68\")   Wt 80.2 kg (176 lb 12.9 oz)   SpO2 92%   BMI 26.88 kg/m²      Constitutional:            Vital signs reviewed                     General: No acute distress noted. Able to communicate appropriately    Eyes:            Extraocular movement was intact.            Pupils appeared equal            Conjunctiva: Pink    ENT:             Hearing was intact              No nasal erythema noted.              Oropharynx was moist. No lesions noted.     Neck:             Supple. No JVD noted.              Thyroid gland did not seem to be enlarged    Cardiovascular:              S1 + S2. Regular.  Pacemaker " noted    Lungs/Respiratory:            Respiratory effort was labored                       Hyperresonant to percussion.            Decreased air Entry Bilaterally with wheezing and basal crackles heard.    Abdomen/GI:            Soft             Bowel sounds sluggishly positive            No obvious organomegaly noted     Musculoskeletal/Extremities:             Gait could not be assessed at this time.             No clubbing, cyanosis noted in the upper extremities.             No edema noted in the lower extremities bilaterally.    Neurologic:             Awake, alert and oriented x 3.             Able to follow simple commands.    Psychiatric:             Affect appeared fair.             Awake, alert and oriented x 3.    Skin:             Scattered bruises noted.             Warm and dry      Labs: Reviewed. Pertinent labs were noted.     Results from last 7 days   Lab Units 03/16/21  0556 03/15/21  0833   WBC 10*3/mm3 6.68 7.36   HEMOGLOBIN g/dL 11.1* 12.9*   HEMATOCRIT % 35.1* 41.7   PLATELETS 10*3/mm3 175 202   NEUTROPHIL % % 91.5* 66.0   NEUTROS ABS 10*3/mm3 6.11 4.86   EOSINOPHIL % % 0.0* 2.6   EOS ABS 10*3/mm3 0.00 0.19   LYMPHOCYTE % % 5.1* 19.7   LYMPHS ABS 10*3/mm3 0.34* 1.45       Results from last 7 days   Lab Units 03/16/21  0556 03/15/21  0833   SODIUM mmol/L 137 143   POTASSIUM mmol/L 4.6 4.3   CHLORIDE mmol/L 94* 95*   CO2 mmol/L 33.9* 38.2*   BUN mg/dL 44* 40*   CREATININE mg/dL 2.07* 2.09*   CALCIUM mg/dL 8.5* 9.4   EGFR IF NONAFRICN AM mL/min/1.73 32* 32*   ANION GAP mmol/L 9.1 9.8   BILIRUBIN mg/dL  --  0.8   ALK PHOS U/L  --  62   ALT (SGPT) U/L  --  10   AST (SGOT) U/L  --  18   GLUCOSE mg/dL 135* 133*   TOTAL PROTEIN g/dL  --  6.8   ALBUMIN g/dL  --  4.60       Results from last 7 days   Lab Units 03/15/21  0833   MAGNESIUM mg/dL 2.4       Lab Results   Component Value Date    PROBNP 2,693.0 (H) 03/15/2021    PROBNP 4,396 (H) 12/29/2020    PROBNP 4,258.0 (H) 12/22/2020       Lab Results    Component Value Date    TSH 1.280 02/12/2021    TSH 1.210 08/24/2020    TSH 0.429 11/06/2019       No results found for: FREET4    Lab Results   Component Value Date    INR 1.00 01/20/2021       Lab Results   Component Value Date    PROCALCITO 0.08 03/15/2021    PROCALCITO 0.11 12/22/2020    PROCALCITO 0.18 12/13/2019       Lab Results   Component Value Date    CRP <0.50 12/31/2017       Lab Results   Component Value Date    SEDRATE 5 08/14/2018    SEDRATE 4 12/31/2017       ABG: Reviewed  Lab Results   Component Value Date    PHART 7.319 (L) 03/16/2021    HLO2DAD 68.6 (C) 03/16/2021    PO2ART 67.7 (L) 03/16/2021    HGBBG 10.9 (L) 12/26/2019    I1CBRKMF 93.1 (L) 03/16/2021    FCOHB 4.5 08/10/2016    CARBOXYHGB 1.0 03/16/2021    FMETHB 0.3 08/10/2016         Micro: As of March 16, 2021   Lab Results   Component Value Date    RESPCX Rejected 12/16/2019    RESPCX Light growth (2+) Candida albicans (A) 09/16/2017    RESPCX Stenotrophomonas maltophilia (A) 09/16/2017     Lab Results   Component Value Date    BLOODCX No growth at 24 hours 03/15/2021    BLOODCX No growth at 5 days 12/13/2020    BLOODCX No growth at 5 days 12/13/2020     Lab Results   Component Value Date    URINECX Final report (A) 12/29/2020    URINECX >100,000 CFU/mL Klebsiella oxytoca (A) 12/13/2020    URINECX <25,000 CFU/mL Gram Positive Cocci (A) 09/25/2020     No results found for: MRSACX  No results found for: MRSAPCR  No results found for: URCX  No components found for: LOWRESPCF  No results found for: THROATCX  No results found for: CULTURES  No components found for: STREPBCX  No results found for: STREPPNEUAG  No results found for: LEGIONELLA  No results found for: MYCOPLASCX  No results found for: GCCX  No results found for: WOUNDCX  No results found for: BODYFLDCX    Lab Results   Component Value Date    FLU Negative 03/18/2017    FLU Negative 03/18/2017       No results found for: ADENOVIRUS  No results found for: PM558L  No results found  for: CVHKU1  No results found for: CVNL63  No results found for: CVOC43  No results found for: HUMETPNEVS  No results found for: HURVEV  No results found for: FLUBPCR  No results found for: PARAINFLUE  No results found for: PARAFLUV2  No results found for: PARAFLUV3  No results found for: PARAFLUV4  No results found for: BPERTPCR  No results found for: [id number]  No results found for: CPNEUPCR  No results found for: MPNEUMO  No results found for: FLUAPCR  No results found for: FLUAH3  No results found for: FLUAH1  No results found for: RSV  No results found for: BPARAPCR    COVID 19:  Lab Results   Component Value Date    COVID19 Not Detected 03/15/2021           Lab Results   Component Value Date    THCURSCR Negative 08/29/2019     Lab Results   Component Value Date    PCPUR Negative 08/29/2019     Lab Results   Component Value Date    COCAINEUR Negative 08/29/2019     Lab Results   Component Value Date    METAMPSCNUR Negative 08/29/2019     Lab Results   Component Value Date    LABOPIASCN Negative 08/29/2019     Lab Results   Component Value Date    AMPHETSCREEN Negative 08/29/2019     Lab Results   Component Value Date    LABBENZSCN Positive (A) 08/29/2019     Lab Results   Component Value Date    TRICYCLICSCN Negative 08/29/2019     Lab Results   Component Value Date    LABMETHSCN Negative 08/29/2019     Lab Results   Component Value Date    BARBITSCNUR Negative 08/29/2019     Lab Results   Component Value Date    OXYCODONESCN Negative 08/29/2019     Lab Results   Component Value Date    PROPOXSCN Negative 08/29/2019     Lab Results   Component Value Date    BUPRENORSCNU Negative 08/29/2019     Lab Results   Component Value Date    ETHANOLMGDL <10 08/29/2019     Lab Results   Component Value Date    ETOHPCT <0.010 08/29/2019              Imaging Study: Latest imaging studies was reviewed personally.   Imaging Results (Last 72 Hours)     Procedure Component Value Units Date/Time    CT Chest Pulmonary Embolism  [[id number]] Collected: 03/15/21 1036     Updated: 03/15/21 1528    Narrative:      PROCEDURE: CT CHEST PULMONARY EMBOLISM     HISTORY: PE suspected, low/intermediate prob, neg D-dimer     COMPARISON: December 2020.     TECHNIQUE: Multiple axial CT images were obtained from the thoracic  inlet through the upper abdomen following the administration of Isovue  300 per the CT PE protocol. Coronal and oblique 3D MIP images were  reconstructed from the original axial data set.      FINDINGS: There are no filling defects within the pulmonary arteries to  suggest an embolus. The thoracic aorta is normal in caliber with no  evidence of dissection. The heart is enlarged. There are no pleural or  pericardial effusions. There is no adenopathy. There are emphysematous  changes. No focal opacities are identified. There is evidence of prior  granulomatous disease. Bone windows reveal no acute osseous  abnormalities.       Impression:      No evidence of pulmonary embolus or dissection.            700.58 mGy.cm        This study was performed with techniques to keep radiation doses as low  as reasonably achievable (ALARA). Individualized dose reduction  techniques using automated exposure control or adjustment of mA and/or  kV according to the patient size were employed.         PROCEDURE:  CT ABDOMEN PELVIS W CONTRAST-     HISTORY:  PE suspected, low/intermediate prob, neg D-dimer     COMPARISON:  None .     TECHNIQUE: Multiple axial CT images were obtained from the lung bases  through the pubic symphysis following the administration of Isovue 300  and oral contrast.      FINDINGS:      ABDOMEN: The liver shows fatty infiltration. There is a stone versus  polyp in the gallbladder. The spleen is unremarkable. No adrenal mass is  present.  The pancreas is normal. A left renal artery stent is present.  The kidneys are normal. The aorta is normal in caliber. There is no free  fluid or adenopathy. There is colonic diverticulosis  without evidence of  acute diverticulitis.     PELVIS: The appendix is normal.  There is diffuse wall thickening of the  urinary bladder which may be due to chronic obstruction or neurogenic  bladder. There is no significant free fluid or adenopathy. There are  postsurgical changes of left hip arthroplasty.     IMPRESSION:  1. Diffuse wall thickening of the urinary bladder may be due to chronic  obstruction or neurogenic bladder.  2. Fatty infiltration of the liver.  3. Stone versus polyp in the gallbladder.     700.58 mGy.cm        This study was performed with techniques to keep radiation doses as low  as reasonably achievable (ALARA). Individualized dose reduction  techniques using automated exposure control or adjustment of mA and/or  kV according to the patient size were employed.         Images were reviewed, interpreted, and dictated by Dr. Richelle Patel M.D.  Transcribed by Ariela Batista PA-C.     This report was finalized on 3/15/2021 3:26 PM by Richelle Patel M.D..    CT Abdomen Pelvis With Contrast [[id number]] Collected: 03/15/21 1036     Updated: 03/15/21 1528    Narrative:      PROCEDURE: CT CHEST PULMONARY EMBOLISM     HISTORY: PE suspected, low/intermediate prob, neg D-dimer     COMPARISON: December 2020.     TECHNIQUE: Multiple axial CT images were obtained from the thoracic  inlet through the upper abdomen following the administration of Isovue  300 per the CT PE protocol. Coronal and oblique 3D MIP images were  reconstructed from the original axial data set.      FINDINGS: There are no filling defects within the pulmonary arteries to  suggest an embolus. The thoracic aorta is normal in caliber with no  evidence of dissection. The heart is enlarged. There are no pleural or  pericardial effusions. There is no adenopathy. There are emphysematous  changes. No focal opacities are identified. There is evidence of prior  granulomatous disease. Bone windows reveal no acute  osseous  abnormalities.       Impression:      No evidence of pulmonary embolus or dissection.            700.58 mGy.cm        This study was performed with techniques to keep radiation doses as low  as reasonably achievable (ALARA). Individualized dose reduction  techniques using automated exposure control or adjustment of mA and/or  kV according to the patient size were employed.         PROCEDURE:  CT ABDOMEN PELVIS W CONTRAST-     HISTORY:  PE suspected, low/intermediate prob, neg D-dimer     COMPARISON:  None .     TECHNIQUE: Multiple axial CT images were obtained from the lung bases  through the pubic symphysis following the administration of Isovue 300  and oral contrast.      FINDINGS:      ABDOMEN: The liver shows fatty infiltration. There is a stone versus  polyp in the gallbladder. The spleen is unremarkable. No adrenal mass is  present.  The pancreas is normal. A left renal artery stent is present.  The kidneys are normal. The aorta is normal in caliber. There is no free  fluid or adenopathy. There is colonic diverticulosis without evidence of  acute diverticulitis.     PELVIS: The appendix is normal.  There is diffuse wall thickening of the  urinary bladder which may be due to chronic obstruction or neurogenic  bladder. There is no significant free fluid or adenopathy. There are  postsurgical changes of left hip arthroplasty.     IMPRESSION:  1. Diffuse wall thickening of the urinary bladder may be due to chronic  obstruction or neurogenic bladder.  2. Fatty infiltration of the liver.  3. Stone versus polyp in the gallbladder.     700.58 mGy.cm        This study was performed with techniques to keep radiation doses as low  as reasonably achievable (ALARA). Individualized dose reduction  techniques using automated exposure control or adjustment of mA and/or  kV according to the patient size were employed.         Images were reviewed, interpreted, and dictated by Dr. Richelle Patel M.D.  Transcribed  by Ariela Batista PA-C.     This report was finalized on 3/15/2021 3:26 PM by Richelle Patel M.D..            ECHO:  Results for orders placed in visit on 12/29/20    Adult Transthoracic Echo Complete W/ Cont if Necessary Per Protocol    Interpretation Summary  · Estimated left ventricular EF = 25% Left ventricular systolic function is moderately decreased.  · The left ventricular cavity is severely dilated.  · Moderate mitral valve regurgitation is present.  · Moderate aortic valve regurgitation is present.      Results for orders placed during the hospital encounter of 01/20/19    Adult Transthoracic Echo Complete W/ Cont if Necessary Per Protocol    Interpretation Summary  Technically difficult study  1) Borderline LVH , moderate dilation of LV with mild reduction systolic function ( EF is about 45%)  2) Moderate left atrial enlargement with normal LVedp  3) Aortic sclerosis with mild AI  4) Mild MR  5) Mild TR with PAsp of 48 mm of hg  6) Mild dilation of the RV with normal RV Function  7) Severe hypokinesis of the inferior and septal walls      Results for orders placed during the hospital encounter of 12/24/17    Adult Transthoracic Echo Complete W/ Cont if Necessary Per Protocol    Interpretation Summary  Technically very limited study  1) Mild to moderate LVH with mild reduction in LV systolic function ( EF 45 to 50%)  2) Mild left atrial enlargement with normal LVedp  3) Mitral thickening with mild MR  4) Sclerosed mildly restricted aortic leaflets with mild AI  5) Mild TR with inabililty to assess pressures  6) Normal size and function of the RV  7) Apical segmental hypokinesis more so along inferior and septal walls        Assessment:  1.  Acute respiratory failure   2.  COPD exacerbation   3.  Acute bronchitis  4.  Hemoptysis  5.  Obstructive sleep apnea  6.  Chronic respiratory acidosis  7.  Chronic hypoxemia    Discussion/Recommendations:   I have reviewed the patient's history and radiological  data.      I will continue antibiotics for now, although I do not believe that his symptoms were secondary to pneumonia but rather were due to acute bronchitis..      Repeat laboratory workup will be followed closely.    I will consider repeating chest x-ray, if clinically appropriate.    Patient hemoptysis was extremely minimal and has not had any further episodes but will keep a close eye on sputum production.    If the patient has any further episodes of hemoptysis, we will consider further work-up.    It is likely that his bronchitis is the cause for hemoptysis especially given almost clear CT scan apart from emphysema and some chronic changes.    I have adjusted the BiPAP and will advise the patient to use BiPAP on a more regular basis during the day as well as at night for now.    ABG will be repeated as clinically indicated.    His last sleep study was reviewed and it confirmed moderate sleep apnea.    We will obtain his settings from the DME company.    I am not sure if the patient is on CPAP or BiPAP at home.    The plan was discussed with the patient.  I have also discussed the case with the nursing staff.    Recommendations were also discussed with the referring provider.     I would like to thank you for the opportunity to participate in the care of this patient.  We will communicate changes and recommendations, if and when necessary.      This document was electronically signed by Slim Toribio MD on 03/16/21 at 09:39 EDT      Dictated utilizing Dragon dictation.

## 2021-03-16 NOTE — PLAN OF CARE
Goal Outcome Evaluation:  Plan of Care Reviewed With: patient  Progress: no change   Pain well controlled today.  Continues to alternate bipap with wearing nasal cannula.  Very SOA with even mild exertion.  Paced rhythm on telemetry.

## 2021-03-16 NOTE — THERAPY EVALUATION
OT evaluation held today d/t pts elevated CO2 and pt requiring bi-pap.  Will follow up with pt tomorrow.

## 2021-03-16 NOTE — PLAN OF CARE
Goal Outcome Evaluation:  Plan of Care Reviewed With: patient  Progress: no change   No acute changes to report. VSS. Resting between care and tolerating bipap. Will continue to monitor.

## 2021-03-16 NOTE — THERAPY EVALUATION
PT evaluation is held due to elevated CO2 levels and requiring Bi-PAP.  PT will follow up tomorrow.

## 2021-03-16 NOTE — PROGRESS NOTES
Discharge Planning Assessment  [facility]     Patient Name: Jani Pena  MRN: [id number]  Today's Date: 3/16/2021    Admit Date: 3/15/2021    Discharge Needs Assessment     Row Name 03/16/21 1309       Living Environment    Provides Primary Care For  no one, unable/limited ability to care for self    Caregiving Concerns  Independent with mobiity but Meds and Meals and other needs are done by facility.       Resource/Environmental Concerns    Transportation Concerns  other (see comments) Spoke to Haleigh at [facility] and informed would likely be discharged Wed or Thursday and would need transportation.  Unable to get a time of discharge but likely after 12 noon       Discharge Needs Assessment    Concerns to be Addressed  no discharge needs identified    Anticipated Changes Related to Illness  none    Equipment Needed After Discharge  none    Outpatient/Agency/Support Group Needs  -- Has O2 and a Cpap from [facility]    Discharge Facility/Level of Care Needs  independent living facility    Provided Post Acute Provider List?  N/A    N/A Provider List Comment  No needs    Provided Post Acute Provider Quality & Resource List?  N/A    N/A Quality & Resource List Comment  No needs    Current Discharge Risk  chronically ill    Discharge Coordination/Progress  Will need to call [facility] to transport.  Please let them know a time if possible.  Cm spoke to Haleigh and informed pt is OBS and would likely be Wed or Thursday.    Row Name 03/16/21 1248       Living Environment    Lives With  other (see comments)    Unique Family Situation  Lives at [facility]    Current Living Arrangements  independent/assisted living facility    Duration at Residence  over 1 year    Potentially Unsafe Housing Conditions  unable to assess    Primary Care Provided by  self    Provides Primary Care For  no one, unable/limited ability to care for self    Family Caregiver if Needed  none    Quality of Family Relationships  unable to assess     Able to Return to Prior Arrangements  yes    Living Arrangement Comments  Lives at [facility], pt is mobile and does not require assistance from staff       Resource/Environmental Concerns    Resource/Environmental Concerns  none       Transition Planning    Patient/Family Anticipates Transition to  home    Patient/Family Anticipated Services at Transition  none    Transportation Anticipated  other (see comments) [facility] can come and get them but would need to be scheduled.       Discharge Needs Assessment    Readmission Within the Last 30 Days  no previous admission in last 30 days    Current Outpatient/Agency/Support Group  other (see comments) [facility] DME for O2 and Cpap        Discharge Plan     Row Name 03/16/21 1316       Plan    Plan Pt lives at [facility].  Called to [facility] at [phone], spoke to Becka pt does have an apartment and is self suffiicient. Does have o2 and wears it 5 liters continuously.  No POA or Living Will .  Uses a Wc and a scooter sometimes but not all the time. Has had Covid, will not need a test prior to discharge.  Cm spoke to pt in room and confirmed he does live at [facility] in Assistive Living.  Does not require assist of staff or mobility.  Wears o2 at 5 liters and does have a Cpap, [facility].  Pt does want to go back to [facility] at discharge, and states they can transport him.  Peñaloza notice explained and verbalized understanding.  Copy of Peñaloza given to pt.     [facility] at [phone] when ready to go back. (As early as possible)  Cm spoke with Haleigh and informed would likely be ready for DC Wed or Thursday after 12 noon, pt is Observation and could not give an exact time.  Please send any updates including PT Evaluation.Call and Fax to [phone].  If here more then 3 days will need to be evaluated by  [facility] Staff.  Cm will continue to follow.        Continued Care and Services - Admitted Since 3/15/2021     Coordination has not been started for this encounter.       Expected Discharge Date and Time     Expected Discharge Date Expected Discharge Time    Mar 17, 2021         Demographic Summary     Row Name 03/16/21 1245       General Information    Admission Type  observation    Arrived From  emergency department    Required Notices Provided  Observation Status Notice    Expected Length of Stay (LOS)  1 to 2 nights    Referral Source  admission list    Reason for Consult  discharge planning    Preferred Language  English     Used During This Interaction  no        Functional Status     Row Name 03/16/21 1246       Functional Status    Usual Activity Tolerance  good    Current Activity Tolerance  fair    Functional Status Comments  Independent       Functional Status, IADL    Medications  completely dependent    Meal Preparation  completely dependent    Housekeeping  completely dependent    Laundry  completely dependent    Shopping  completely dependent    IADL Comments  Sefl       Mental Status    General Appearance WDL  WDL       Mental Status Summary    Recent Changes in Mental Status/Cognitive Functioning  no changes    Mental Status Comments  Alert and oriented       Employment/    Employment Status  disabled    Current or Previous Occupation  unable to assess        Psychosocial    No documentation.       Abuse/Neglect    No documentation.       Legal    No documentation.       Substance Abuse    No documentation.       Patient Forms    No documentation.           Belgica Barry RN

## 2021-03-16 NOTE — PROGRESS NOTES
[facility]    Name:  Jani Pena   Age:  70 y.o.  Sex:  male  :  1950  MRN:  [id number]   Visit Number:  [id number]  Admission Date:  3/15/2021  Date Of Service:  21  Primary Care Physician:  Miguel Rojas MD     LOS: 0 days :  Patient Care Team:  Miguel Rojas MD as PCP - General (Internal Medicine)  Ulysses Bass MD as Cardiologist (Cardiology):    History taken from:     patient chart    Chief Complaint:      Dyspnea    Subjective:    Interval History:     Patient seen and examined today.  Reviewed history physical, lab work, x-rays, chart.    Patient 70-year-old male with paroxysmal A. fib lesion, chronic kidney disease stage IIIb, GERD, chronic hypoxic respiratory failure on home O2, chronic hypercapnic respiratory failure, sick sinus syndrome with pacemaker comes with shortness of breath and hemoptysis.  He has been fatigued and weak.  He continues to have shortness of breath.  He denies chest pressure, nausea, vomiting.    He has COPD and is followed by pulmonology as outpatient.  He has no history of atrial fibrillation but is not on anticoagulation versus aspirin Plavix.    He has esophageal motility issues was referred to manometry at [facility], [place], operated by [facility] in [place], this will need to be rescheduled.    CT scan of the chest showed no evidence of PE or dissection or pneumonia.  Patient was admitted placed on Rocephin, Zithromax, Solu-Medrol.  He feels mildly better.  His blood gases not much improved however.  Possible urinary tract infection was noted, however cultures contaminated.    Patient has coronary artery disease, his troponin was minimally elevated and trended down.  Suspect this is supply demand mismatch.    Review of Systems:     All systems were reviewed and negative except for:  Respiratory: positive for  shortness of air    Objective:    Vital Signs:    Temp:  [97.3 °F (36.3 °C)-97.9 °F (36.6 °C)] 97.6 °F (36.4 °C)  Heart Rate:   [] 72  Resp:  [17-22] 17  BP: (101-140)/(66-88) 136/85    Physical Exam:    General: Alert and orient x4, mild distress  Heart: Paced rhythm.  No murmur rub or thrill noted.  Lungs: Decreased present bilaterally without use of accessory muscles respiration.  Abdomen: Soft/nontender/nondistended.  No HSM noted.  MSK: 4/5 strength in upper/lower extremity bilaterally.     Results Review:      I reviewed the patient's new clinical results.    Labs:    Results from last 7 days   Lab Units 03/16/21  0556 03/15/21  0833   SODIUM mmol/L 137 143   POTASSIUM mmol/L 4.6 4.3   CHLORIDE mmol/L 94* 95*   CO2 mmol/L 33.9* 38.2*   BUN mg/dL 44* 40*   CREATININE mg/dL 2.07* 2.09*   CALCIUM mg/dL 8.5* 9.4   BILIRUBIN mg/dL  --  0.8   ALK PHOS U/L  --  62   ALT (SGPT) U/L  --  10   AST (SGOT) U/L  --  18   GLUCOSE mg/dL 135* 133*     Results from last 7 days   Lab Units 03/16/21  0556 03/15/21  0833   WBC 10*3/mm3 6.68 7.36   HEMOGLOBIN g/dL 11.1* 12.9*   HEMATOCRIT % 35.1* 41.7   PLATELETS 10*3/mm3 175 202         Results from last 7 days   Lab Units 03/15/21  2239 03/15/21  1650 03/15/21  0833   CK TOTAL U/L  --   --  77   TROPONIN T ng/mL 0.037* 0.040* 0.065*     Results from last 7 days   Lab Units 03/15/21  1007 03/15/21  0924 03/15/21  0923   BLOODCX   --  No growth at 24 hours No growth at 24 hours   URINECX  50,000 CFU/mL Mixed [place] Isolated  --   --      Results from last 7 days   Lab Units 03/16/21  0755   PH, ARTERIAL pH units 7.319*   PO2 ART mm Hg 67.7*   PCO2, ARTERIAL mm Hg 68.6*   HCO3 ART mmol/L 35.2*        Radiology:    Imaging Results (Last 24 Hours)     ** No results found for the last 24 hours. **          Medication Review:     amLODIPine, 5 mg, Oral, Q24H  aspirin, 81 mg, Oral, Daily  atorvastatin, 10 mg, Oral, Q PM  azithromycin, 500 mg, Intravenous, Q24H  cefTRIAXone, 1 g, Intravenous, Q24H  clopidogrel, 75 mg, Oral, Daily  escitalopram, 10 mg, Oral, Daily  famotidine, 40 mg, Oral, Daily  ferrous  sulfate, 324 mg, Oral, Daily With Breakfast  finasteride, 5 mg, Oral, Daily  furosemide, 20 mg, Oral, Daily  ipratropium-albuterol, 3 mL, Nebulization, Q6H - RT  lidocaine, 1 patch, Transdermal, Q24H  methylPREDNISolone sodium succinate, 40 mg, Intravenous, Q8H  oxybutynin, 5 mg, Oral, Nightly  potassium chloride, 10 mEq, Oral, Daily  sodium chloride, 3 mL, Intravenous, Q12H  tamsulosin, 0.4 mg, Oral, Nightly  traZODone, 50 mg, Oral, Q PM             Assessment:    Problem List Items Addressed This Visit        Other    Acute respiratory failure (CMS/HCC) - Primary      Other Visit Diagnoses     Chronic renal impairment, unspecified CKD stage        Urinary tract infection without hematuria, site unspecified        History of COPD        Oxygen dependent              1.  Acute on chronic hypoxic and hypercapnic respiratory failure.  2.  COPD with exacerbation  3.  Possible UTI, culture contaminated.  4.  Elevation in troponin, type II MI, supply demand mismatch  5.  Sick sinus syndrome pacemaker in place  6.  Chronic kidney disease stage IIIb  7.  Atrial fibrillation, currently rate controlled.  Not on anticoagulation.  8.  Suspected esophageal dysmotility, referred for esophageal manometry by Dr. Tinsley at [facility].  9.  Coronary artery disease on medical management  10.  Probable acute bronchitis  11.  Fatty infiltration of the liver  12.  Hemoptysis      Plan:    Continue Solu-Medrol, Rocephin and Zithromax.  Pulmonology consulted.  PT, OT, speech therapy to follow.  Continue with BiPAP.  Check lab work in a.m.  Check ABG in a.m.  Patient with guarded prognosis.  Will likely need rehab.  Further recommendations will pend clinical course.    Joshua Mendez DO  03/16/21  13:21 EDT

## 2021-03-16 NOTE — CONSULTS
"Adult Nutrition  Assessment/PES    Patient Name:  Jani Pena  YOB: 1950  MRN: [id number]  Admit Date:  3/15/2021    Assessment Date:  3/16/2021    Comments:    Recommend:  1. Consider adding renal modification to current diet order as medically appropriate and tolerated.  2. Encourage PO intake. PO intake average ~87% x 2 meals.  3. RD ordered Novasource Renal daily.  4. Consider a renal multivitamin with minerals daily.  5. Continue to monitor and replace electrolytes PRN.     RD provided nutrition education for overweight status focusing on healthy nutrition and left handouts from AND with RD contact info. RD to follow pt and available PRN.      Reason for Assessment     Row Name 03/16/21 9643          Reason for Assessment    Reason For Assessment  nurse/nurse practitioner consult;identified at risk by screening criteria     Diagnosis  cardiac disease;pulmonary disease;liver disease;neurologic conditions;gastrointestinal disease A/CKD Stage 3b, GERD, Chronic hypoxic respiratory failure, COPD, Afib, CHF, Fatty infiltration of the liver, Suspected esophageal dysmotility, CAD     Identified At Risk by Screening Criteria  difficulty chewing/swallowing           Anthropometrics     Row Name 03/16/21 1350          Anthropometrics    Height  172.7 cm (68\")        Ideal Body Weight (IBW)    Ideal Body Weight (IBW) (kg)  70.89         Labs/Tests/Procedures/Meds     Row Name 03/16/21 1350          Labs/Procedures/Meds    Lab Results Reviewed  reviewed, pertinent     Lab Results Comments  Low: Cl- High: Gluc, BUN, Cr        Medications    Pertinent Medications Reviewed  reviewed, pertinent     Pertinent Medications Comments  Lipitor, Pepcid, Lasix, Solu-medrol           Estimated/Assessed Needs     Row Name 03/16/21 1350          Calculation Measurements    Weight Used For Calculations  80.2 kg (176 lb 12.9 oz) Actual BW     Height  172.7 cm (68\")        Estimated/Assessed Needs    Additional " Documentation  Calorie Requirements (Group);Protein Requirements (Group);Mitchell-St. Jeor Equation (Group);Fluid Requirements (Group)        Calorie Requirements    Estimated Calorie Need Method  Mitchell-St Jeor     Estimated Calorie Requirement Comment  2000 - 2200        Mitchell-St. Jeor Equation    RMR (Mitchell-St. Jeor Equation)  1536.49     Mitchell-St. Jeor Activity Factors  -- AF 1.3        Protein Requirements    Weight Used For Protein Calculations  80.2 kg (176 lb 12.9 oz) Actual BW     Est Protein Requirement Amount (gms/kg)  0.8 gm protein 49 - 64 gm     Estimated Protein Requirements (gms/day)  64.16        Fluid Requirements    Estimated Fluid Requirement Method  Mickey-Segar Formula     Rosepine-Segar Method (over 20 kg)  3103.98         Nutrition Prescription Ordered     Row Name 03/16/21 1351          Nutrition Prescription PO    Current PO Diet  Dysphagia     Dysphagia Level  4  Mechanical soft no mixed consistencies     Fluid Consistency  Thin     Common Modifiers  Cardiac         Evaluation of Received Nutrient/Fluid Intake     Row Name 03/16/21 1352          PO Evaluation    Number of Days PO Intake Evaluated  2 days     Number of Meals  2     % PO Intake  87               Problem/Interventions:  Problem 1     Row Name 03/16/21 1352          Nutrition Diagnoses Problem 1    Problem 1  Impaired Nutrient Utilization     Etiology (related to)  Medical Diagnosis     Renal  CKD     Signs/Symptoms (evidenced by)  Biochemical     Specific Labs Noted  Glucose;BUN;Creatinine         Problem 2     Row Name 03/16/21 1353          Nutrition Diagnoses Problem 2    Problem 2  Needs Alternate Composition     Macronutrient  Kcal;Fiber;Protein;Carbohydrate;Fat     Micronutrient  Vitamin;Mineral     Etiology (related to)  Medical Diagnosis     Neurological  Other (comment) Suspected esophageal dysmotility     Signs/Symptoms (evidenced by)  Report/Observation     Reported/Observed By  MD             Intervention  Goal     Row Name 03/16/21 1355          Intervention Goal    General  Meet nutritional needs for age/condition;Improved nutrition related lab(s)     PO  Meet estimated needs;Maintain intake;PO intake (%)     PO Intake %  -- 75 - 100%     Weight  Maintain weight         Nutrition Intervention     Row Name 03/16/21 1351          Nutrition Intervention    RD/Tech Action  Follow Tx progress;Encourage intake;Recommend/ordered     Recommended/Ordered  Supplement;Diet         Nutrition Prescription     Row Name 03/16/21 1355          Nutrition Prescription PO    PO Prescription  Begin/change diet;Begin/change supplement     Begin/Change Diet to  Dysphagia     Fluid Consistency  Thin     Supplement  Nova Renal     Supplement Frequency  Daily     Common Modifiers  Cardiac;Renal     New PO Prescription Ordered?  No, recommended        Other Orders    Obtain Weight  Daily     Obtain Weight Ordered?  No, recommended     Supplement  Vitamin mineral supplement     Supplement Ordered?  No, recommended     Other  Continue to monitor and replace electrolytes PRN         Education/Evaluation     Row Name 03/16/21 1759          Education    Education  Will Instruct as appropriate        Monitor/Evaluation    Monitor  Per protocol;I&O;PO intake;Supplement intake;Pertinent labs;Weight;Skin status           Electronically signed by:  Fabiana Jonas RD  03/16/21 14:05 EDT

## 2021-03-17 NOTE — PLAN OF CARE
Goal Outcome Evaluation:    Patient compliant with bipap this shift. O2 at 5L NC when not wearing bipap. VSS. Speech eval completed today. No acute events noted this shift.

## 2021-03-17 NOTE — THERAPY EVALUATION
Patient Name: Jani Pena  : 1950    MRN: [id number]                              Today's Date: 3/17/2021       Admit Date: 3/15/2021    Visit Dx:     ICD-10-CM ICD-9-CM   1. Acute respiratory failure, unspecified whether with hypoxia or hypercapnia (CMS/HCC)  J96.00 518.81   2. Chronic renal impairment, unspecified CKD stage  N18.9 585.9   3. Urinary tract infection without hematuria, site unspecified  N39.0 599.0   4. History of COPD  Z87.09 V12.69   5. Oxygen dependent  Z99.81 V46.2     Patient Active Problem List   Diagnosis   • Anxiety   • Atrial fibrillation (CMS/HCC)   • Chronic kidney disease   • Steroid-dependent COPD (CMS/HCC)   • Degeneration of cervical intervertebral disc   • Gastroesophageal reflux disease   • Diverticulosis   • Hemorrhoids   • Hiatal hernia   • Hyperlipidemia   • Kyphosis, acquired   • PEDRO on CPAP   • Benign prostatic hyperplasia with incomplete bladder emptying   • Depression   • History of ventricular septal defect   • Chronic diastolic congestive heart failure (CMS/HCC)   • Coronary artery disease involving native coronary artery of native heart with angina pectoris (CMS/HCC)   • Lumbar radiculopathy   • Anemia   • Cardiac pacemaker in situ   • Sick sinus syndrome (CMS/HCC)   • Acute renal failure (ARF) (CMS/HCC)   • Chronic respiratory failure with hypoxia and hypercapnia (CMS/HCC)   • Hematoma   • Flexural eczema   • Xerosis of skin   • Abnormal urinalysis   • Esophageal dysphagia   • Fall   • Presbyesophagus   • Sepsis secondary to UTI (CMS/HCC)   • Acute respiratory failure (CMS/HCC)   • Acute on chronic respiratory failure with hypercapnia (CMS/HCC)   • Overweight (BMI 25.0-29.9)     Past Medical History:   Diagnosis Date   • Abnormal liver enzymes    • Anemia    • Anxiety    • Arthritis    • Atherosclerotic heart disease of native coronary artery without angina pectoris    • Atrial fibrillation (CMS/HCC)    • Back pain    • BPH (benign prostatic hyperplasia)     • Cataract, bilateral    • Chest pain     2-3 MONTHS AGO.  PER PT, HAS ADDRESSED WITH CARDIOLOGIST.  STATES HAS NTG PRN.   • CHF (congestive heart failure) (CMS/HCC)    • Chronic bronchitis (CMS/HCC)    • Chronic kidney disease    • COPD (chronic obstructive pulmonary disease) (CMS/HCC)    • Degeneration of cervical intervertebral disc    • Depression    • Diverticulosis    • Dyspnea    • Esophageal reflux    • Esophagitis    • Gastritis    • Hematuria    • Hemorrhoids    • Hiatal hernia    • History of arthritis    • History of nuclear stress test     UNSURE OF DATE   • History of peripheral neuropathy    • History of ventricular septal defect    • History of ventricular septal defect    • Hyperlipidemia    • Hypertension    • Impaired functional mobility, balance, gait, and endurance    • Infectious diarrhea    • Kyphosis, acquired    • Lumbar radiculopathy    • Mitral and aortic valve disease    • Nephrolithiasis    • Phimosis    • Problems with swallowing     WITH FOOD   • Respiratory failure (CMS/HCC)    • Sleep apnea     BIPAP, too awkward to bring   • TIA (transient ischemic attack)     X3.   2014   • Ventral hernia    • Wears glasses     FOR READING     Past Surgical History:   Procedure Laterality Date   • CARDIAC CATHETERIZATION     • CARDIAC CATHETERIZATION N/A 5/24/2018    Procedure: Left Heart Cath;  Surgeon: Edouard Robertson MD;  Location:  [facility];  Service: Cardiovascular   • CARDIAC ELECTROPHYSIOLOGY PROCEDURE N/A 1/31/2019    Procedure: PACEMAKER IMPLANTATION- DC;  Surgeon: Omar Encinas DO;  Location:  [facility];  Service: Cardiology   • COLONOSCOPY N/A 12/28/2019    Procedure: COLONOSCOPY WITH HOT POLYPECTOMY;  Surgeon: Celio Maldonado MD;  [facility]  [facility];  Service: Gastroenterology   • ENDOSCOPY N/A 1/26/2019    Procedure: ESOPHAGOGASTRODUODENOSCOPY;  Surgeon: Brunner, Mark I, MD;  Location:  [facility];  Service: Gastroenterology   •  ENDOSCOPY N/A 12/27/2019    Procedure: ESOPHAGOGASTRODUODENOSCOPY;  Surgeon: Valdemar Mcdonald MD;  Location: [facility] ENDOSCOPY;  Service: General   • ENDOSCOPY N/A 10/20/2020    Procedure: ESOPHAGOGASTRODUODENOSCOPY WITH DILATION AND COLD FORCEP BIOPSY;  Surgeon: Cale Madrid MD;  Location: [facility] ENDOSCOPY;  Service: Gastroenterology;  Laterality: N/A;   • HERNIA REPAIR     • ORTHOPEDIC SURGERY Left     Left hip arthroplasty   • PACEMAKER IMPLANTATION  01/31/2019   • SUPRAPUBIC CATHETER INSERTION      History of Percutaneous Catheter Placement Into Ureter   • THROAT SURGERY      FOR SLEEP APNEA   • VSD REPAIR      History of VSD Repair By Patch Closure     General Information     Row Name 03/17/21 1109          Physical Therapy Time and Intention    Document Type  evaluation  -JR     Mode of Treatment  physical therapy  -JR     Row Name 03/17/21 1109          General Information    Patient Profile Reviewed  yes  -JR     Prior Level of Function  independent:;all household mobility  -JR     Existing Precautions/Restrictions  fall;oxygen therapy device and L/min  -JR     Barriers to Rehab  medically complex  -JR     Row Name 03/17/21 1109          Living Environment    Lives With  facility resident [facility]  -JR     Row Name 03/17/21 1109          Home Main Entrance    Number of Stairs, Main Entrance  none  -JR     Row Name 03/17/21 1109          Cognition    Orientation Status (Cognition)  oriented x 4  -JR     Row Name 03/17/21 1109          Safety Issues, Functional Mobility    Impairments Affecting Function (Mobility)  endurance/activity tolerance;shortness of breath;strength  -JR       User Key  (r) = Recorded By, (t) = Taken By, (c) = Cosigned By    Initials Name Provider Type    JR Kaylee Kurtz PT Physical Therapist        Mobility     Row Name 03/17/21 1109          Bed Mobility    Bed Mobility  supine-sit  -JR     Supine-Sit [place] (Bed Mobility)  supervision  -      Assistive Device (Bed Mobility)  head of bed elevated  -     Row Name 03/17/21 1109          Sit-Stand Transfer    Sit-Stand [place] (Transfers)  contact guard  -     Assistive Device (Sit-Stand Transfers)  walker, front-wheeled  -JR     Row Name 03/17/21 1109          Gait/Stairs (Locomotion)    [place] Level (Gait)  contact guard  -JR     Assistive Device (Gait)  walker, front-wheeled  -JR     Distance in Feet (Gait)  120  -JR     Deviations/Abnormal Patterns (Gait)  stride length decreased;festinating/shuffling  -JR     Bilateral Gait Deviations  forward flexed posture;heel strike decreased  -JR       User Key  (r) = Recorded By, (t) = Taken By, (c) = Cosigned By    Initials Name Provider Type    Kaylee Lemos PT Physical Therapist        Obj/Interventions     Row Name 03/17/21 1109          Range of Motion Comprehensive    General Range of Motion  bilateral lower extremity ROM WFL  -     Row Name 03/17/21 1109          Strength Comprehensive (MMT)    Comment, General Manual Muscle Testing (MMT) Assessment  BLE 4-/5  -JR     Row Name 03/17/21 1109          Balance    Balance Assessment  sitting static balance;sitting dynamic balance;standing static balance;standing dynamic balance  -JR     Static Sitting Balance  WFL  -JR     Dynamic Sitting Balance  WFL  -JR     Static Standing Balance  WFL  -JR     Dynamic Standing Balance  mild impairment  -JR       User Key  (r) = Recorded By, (t) = Taken By, (c) = Cosigned By    Initials Name Provider Type    Kaylee Lemos, PT Physical Therapist        Goals/Plan     Row Name 03/17/21 1109          Bed Mobility Goal 1 (PT)    Activity/Assistive Device (Bed Mobility Goal 1, PT)  bed mobility activities, all  -JR     [place] Level/Cues Needed (Bed Mobility Goal 1, PT)  modified independence  -     Time Frame (Bed Mobility Goal 1, PT)  short term goal (STG)  -     Progress/Outcomes (Bed Mobility Goal 1, PT)  goal ongoing  -     Row Name 03/17/21  1109          Transfer Goal 1 (PT)    Activity/Assistive Device (Transfer Goal 1, PT)  sit-to-stand/stand-to-sit;bed-to-chair/chair-to-bed  -JR     [place] Level/Cues Needed (Transfer Goal 1, PT)  standby assist  -JR     Time Frame (Transfer Goal 1, PT)  short term goal (STG)  -JR     Progress/Outcome (Transfer Goal 1, PT)  goal ongoing  -JR     Row Name 03/17/21 1109          Gait Training Goal 1 (PT)    Activity/Assistive Device (Gait Training Goal 1, PT)  gait (walking locomotion);assistive device use  -JR     [place] Level (Gait Training Goal 1, PT)  standby assist  -JR     Distance (Gait Training Goal 1, PT)  250  -JR     Time Frame (Gait Training Goal 1, PT)  short term goal (STG)  -JR     Strategies/Barriers (Gait Training Goal 1, PT)  maintaining oxygen saturation at or above 90%  -JR     Progress/Outcome (Gait Training Goal 1, PT)  goal ongoing  -JR     Row Name 03/17/21 1109          Patient Education Goal (PT)    Activity (Patient Education Goal, PT)  Perform BLE exercises x 15 reps  -JR     [place]/Cues/Accuracy (Memory Goal 2, PT)  demonstrates adequately  -JR     Time Frame (Patient Education Goal, PT)  4 days  -JR     Progress/Outcome (Patient Education Goal, PT)  goal ongoing  -JR       User Key  (r) = Recorded By, (t) = Taken By, (c) = Cosigned By    Initials Name Provider Type    Kaylee Lemos, PT Physical Therapist        Clinical Impression     Row Name 03/17/21 1109          Pain    Additional Documentation  Pain Scale: Numbers Pre/Post-Treatment (Group)  -JR     Row Name 03/17/21 1109          Pain Scale: Numbers Pre/Post-Treatment    Pretreatment Pain Rating  0/10 - no pain  -JR     Posttreatment Pain Rating  0/10 - no pain  -JR     Row Name 03/17/21 1109          Plan of Care Review    Plan of Care Reviewed With  patient  -JR     Progress  no change  -JR     Outcome Summary  Patient participated in skilled PTevaluation and demonstrates decreased activity tolerance, balance  and gait.  Patient is able to walk 120 feet with CGA and RW.  He is expected to benefit from additional PT services while hospitalized and up on discharge to [facility].  -     Row Name 03/17/21 1109          Therapy Assessment/Plan (PT)    Patient/Family Therapy Goals Statement (PT)  Patient wants to feel better and go back to [facility]  -JR     Rehab Potential (PT)  good, to achieve stated therapy goals  -JR     Criteria for Skilled Interventions Met (PT)  yes;meets criteria;skilled treatment is necessary  -JR     Row Name 03/17/21 1109          Vital Signs    Pre SpO2 (%)  96  -JR     O2 Delivery Pre Treatment  supplemental O2 4.5 LPLM  -JR     Intra SpO2 (%)  90  -JR     O2 Delivery Intra Treatment  supplemental O2 6 LPM  -JR     Post SpO2 (%)  96  -JR     O2 Delivery Post Treatment  supplemental O2 4.5  -JR     Pre Patient Position  Supine  -JR     Intra Patient Position  Standing  -JR     Post Patient Position  Sitting  -JR     Row Name 03/17/21 1109          Positioning and Restraints    Post Treatment Position  chair  -JR     In Chair  reclined;call light within reach;encouraged to call for assist  -JR       User Key  (r) = Recorded By, (t) = Taken By, (c) = Cosigned By    Initials Name Provider Type    JR Kaylee Kurtz, PT Physical Therapist        Outcome Measures     Row Name 03/17/21 1109          How much help from another person do you currently need...    Turning from your back to your side while in flat bed without using bedrails?  3  -JR     Moving from lying on back to sitting on the side of a flat bed without bedrails?  3  -JR     Moving to and from a bed to a chair (including a wheelchair)?  3  -JR     Standing up from a chair using your arms (e.g., wheelchair, bedside chair)?  3  -JR     Climbing 3-5 steps with a railing?  2  -JR     To walk in hospital room?  3  -JR     AM-PAC 6 Clicks Score (PT)  17  -JR     Row Name 03/17/21 1109          Functional Assessment    Outcome Measure Options  AM-PAC  6 Clicks Basic Mobility (PT)  -       User Key  (r) = Recorded By, (t) = Taken By, (c) = Cosigned By    Initials Name Provider Type    Kaylee Lemos PT Physical Therapist        Physical Therapy Education                 Title: PT OT SLP Therapies (In Progress)     Topic: Physical Therapy (In Progress)     Point: Mobility training (Done)     Learning Progress Summary           Patient Acceptance, E,TB, VU by  at 3/17/2021 8981    Comment: Role of PT per POC                   Point: Home exercise program (Not Started)     Learner Progress:  Not documented in this visit.          Point: Body mechanics (Not Started)     Learner Progress:  Not documented in this visit.          Point: Precautions (Not Started)     Learner Progress:  Not documented in this visit.                      User Key     Initials Effective Dates Name Provider Type [facility] 04/03/18 -  Kaylee Kurtz PT Physical Therapist PT              PT Recommendation and Plan  Planned Therapy Interventions (PT): strengthening, balance training, bed mobility training, transfer training, gait training, home exercise program, patient/family education  Plan of Care Reviewed With: patient  Progress: no change  Outcome Summary: Patient participated in skilled PTevaluation and demonstrates decreased activity tolerance, balance and gait.  Patient is able to walk 120 feet with CGA and RW.  He is expected to benefit from additional PT services while hospitalized and up on discharge to [facility].     Time Calculation:   PT Charges     Row Name 03/17/21 1546             Time Calculation    Start Time  1109  -      PT Received On  03/17/21  -      PT Goal Re-Cert Due Date  03/27/21  -        User Key  (r) = Recorded By, (t) = Taken By, (c) = Cosigned By    Initials Name Provider Type    Kaylee Lemos PT Physical Therapist        Therapy Charges for Today     Code Description Service Date Service Provider Modifiers Qty    [id number]  PT EVAL LOW  COMPLEXITY 3 3/17/2021 Kaylee Kurtz, PT GP 1          PT G-Codes  Outcome Measure Options: AM-PAC 6 Clicks Daily Activity (OT)  AM-PAC 6 Clicks Score (PT): 17  AM-PAC 6 Clicks Score (OT): 20    Kaylee Kurtz, PT  3/17/2021

## 2021-03-17 NOTE — THERAPY EVALUATION
Patient Name: Jani Pena  : 1950    MRN: [id number]                              Today's Date: 3/17/2021       Admit Date: 3/15/2021    Visit Dx:     ICD-10-CM ICD-9-CM   1. Acute respiratory failure, unspecified whether with hypoxia or hypercapnia (CMS/HCC)  J96.00 518.81   2. Chronic renal impairment, unspecified CKD stage  N18.9 585.9   3. Urinary tract infection without hematuria, site unspecified  N39.0 599.0   4. History of COPD  Z87.09 V12.69   5. Oxygen dependent  Z99.81 V46.2     Patient Active Problem List   Diagnosis   • Anxiety   • Atrial fibrillation (CMS/HCC)   • Chronic kidney disease   • Steroid-dependent COPD (CMS/HCC)   • Degeneration of cervical intervertebral disc   • Gastroesophageal reflux disease   • Diverticulosis   • Hemorrhoids   • Hiatal hernia   • Hyperlipidemia   • Kyphosis, acquired   • PEDRO on CPAP   • Benign prostatic hyperplasia with incomplete bladder emptying   • Depression   • History of ventricular septal defect   • Chronic diastolic congestive heart failure (CMS/HCC)   • Coronary artery disease involving native coronary artery of native heart with angina pectoris (CMS/HCC)   • Lumbar radiculopathy   • Anemia   • Cardiac pacemaker in situ   • Sick sinus syndrome (CMS/HCC)   • Acute renal failure (ARF) (CMS/HCC)   • Chronic respiratory failure with hypoxia and hypercapnia (CMS/HCC)   • Hematoma   • Flexural eczema   • Xerosis of skin   • Abnormal urinalysis   • Esophageal dysphagia   • Fall   • Presbyesophagus   • Sepsis secondary to UTI (CMS/HCC)   • Acute respiratory failure (CMS/HCC)   • Acute on chronic respiratory failure with hypercapnia (CMS/HCC)   • Overweight (BMI 25.0-29.9)     Past Medical History:   Diagnosis Date   • Abnormal liver enzymes    • Anemia    • Anxiety    • Arthritis    • Atherosclerotic heart disease of native coronary artery without angina pectoris    • Atrial fibrillation (CMS/HCC)    • Back pain    • BPH (benign prostatic hyperplasia)     • Cataract, bilateral    • Chest pain     2-3 MONTHS AGO.  PER PT, HAS ADDRESSED WITH CARDIOLOGIST.  STATES HAS NTG PRN.   • CHF (congestive heart failure) (CMS/HCC)    • Chronic bronchitis (CMS/HCC)    • Chronic kidney disease    • COPD (chronic obstructive pulmonary disease) (CMS/HCC)    • Degeneration of cervical intervertebral disc    • Depression    • Diverticulosis    • Dyspnea    • Esophageal reflux    • Esophagitis    • Gastritis    • Hematuria    • Hemorrhoids    • Hiatal hernia    • History of arthritis    • History of nuclear stress test     UNSURE OF DATE   • History of peripheral neuropathy    • History of ventricular septal defect    • History of ventricular septal defect    • Hyperlipidemia    • Hypertension    • Impaired functional mobility, balance, gait, and endurance    • Infectious diarrhea    • Kyphosis, acquired    • Lumbar radiculopathy    • Mitral and aortic valve disease    • Nephrolithiasis    • Phimosis    • Problems with swallowing     WITH FOOD   • Respiratory failure (CMS/HCC)    • Sleep apnea     BIPAP, too awkward to bring   • TIA (transient ischemic attack)     X3.   2014   • Ventral hernia    • Wears glasses     FOR READING     Past Surgical History:   Procedure Laterality Date   • CARDIAC CATHETERIZATION     • CARDIAC CATHETERIZATION N/A 5/24/2018    Procedure: Left Heart Cath;  Surgeon: Edouard Robertson MD;  Location:  [facility];  Service: Cardiovascular   • CARDIAC ELECTROPHYSIOLOGY PROCEDURE N/A 1/31/2019    Procedure: PACEMAKER IMPLANTATION- DC;  Surgeon: Omar Encinas DO;  Location:  [facility];  Service: Cardiology   • COLONOSCOPY N/A 12/28/2019    Procedure: COLONOSCOPY WITH HOT POLYPECTOMY;  Surgeon: Celio Maldonado MD;  [facility]:  [facility];  Service: Gastroenterology   • ENDOSCOPY N/A 1/26/2019    Procedure: ESOPHAGOGASTRODUODENOSCOPY;  Surgeon: Brunner, Mark I, MD;  Location:  [facility];  Service: Gastroenterology   •  ENDOSCOPY N/A 12/27/2019    Procedure: ESOPHAGOGASTRODUODENOSCOPY;  Surgeon: Valdemar Mcdonald MD;  Location: [facility] ENDOSCOPY;  Service: General   • ENDOSCOPY N/A 10/20/2020    Procedure: ESOPHAGOGASTRODUODENOSCOPY WITH DILATION AND COLD FORCEP BIOPSY;  Surgeon: Cale Madrid MD;  Location: [facility] ENDOSCOPY;  Service: Gastroenterology;  Laterality: N/A;   • HERNIA REPAIR     • ORTHOPEDIC SURGERY Left     Left hip arthroplasty   • PACEMAKER IMPLANTATION  01/31/2019   • SUPRAPUBIC CATHETER INSERTION      History of Percutaneous Catheter Placement Into Ureter   • THROAT SURGERY      FOR SLEEP APNEA   • VSD REPAIR      History of VSD Repair By Patch Closure     General Information     [facility] Name 03/17/21 1318          OT Time and Intention    Document Type  evaluation  -     Mode of Treatment  occupational therapy  -[facility] Name 03/17/21 1318          General Information    Patient Profile Reviewed  yes  -     Prior Level of Function  independent:;ADL's;all household mobility  -     Existing Precautions/Restrictions  fall;oxygen therapy device and L/min  -     Barriers to Rehab  previous functional deficit;medically complex  -[facility] Name 03/17/21 1318          Occupational Profile    Reason for Services/Referral (Occupational Profile)  ADL decline  -[facility] Name 03/17/21 1318          Living Environment    Lives With  facility resident [facility]  -[facility] Name 03/17/21 1318          Home Main Entrance    Number of Stairs, Main Entrance  none  -[facility] Name 03/17/21 1318          Cognition    Orientation Status (Cognition)  oriented x 4  -[facility] Name 03/17/21 1318          Safety Issues, Functional Mobility    Impairments Affecting Function (Mobility)  endurance/activity tolerance;shortness of breath;strength  -       User Key  (r) = Recorded By, (t) = Taken By, (c) = Cosigned By    Initials Name Provider Type     Akilah Gutierrez Occupational Therapist           Mobility/ADL's     Row Name 03/17/21 1319          Bed Mobility    Bed Mobility  supine-sit  -     Supine-Sit Lamb (Bed Mobility)  supervision  -     Assistive Device (Bed Mobility)  head of bed elevated  -     Row Name 03/17/21 1319          Transfers    Transfers  sit-stand transfer  -     Sit-Stand Lamb (Transfers)  contact guard  -[facility] Name 03/17/21 1319          Sit-Stand Transfer    Assistive Device (Sit-Stand Transfers)  walker, front-wheeled  -     Row Name 03/17/21 1319          Functional Mobility    Functional Mobility- Ind. Level  contact guard assist  -     Functional Mobility- Device  rolling walker  -     Functional Mobility-Distance (Feet)  120  -     Functional Mobility- Safety Issues  supplemental O2  -     Row Name 03/17/21 1319          Activities of Daily Living    BADL Assessment/Intervention  bathing;upper body dressing;lower body dressing;grooming;feeding;toileting  -[facility] Name 03/17/21 1319          Bathing Assessment/Intervention    Lamb Level (Bathing)  contact guard assist  -     Comment (Bathing)  increased time to complete d/t soa  -[facility] Name 03/17/21 1319          Upper Body Dressing Assessment/Training    Lamb Level (Upper Body Dressing)  independent  -[facility] Name 03/17/21 1319          Lower Body Dressing Assessment/Training    Lamb Level (Lower Body Dressing)  set up  -[facility] Name 03/17/21 1319          Grooming Assessment/Training    Lamb Level (Grooming)  set up  -[facility] Name 03/17/21 1319          Self-Feeding Assessment/Training    Lamb Level (Feeding)  independent  -[facility] Name 03/17/21 1319          Toileting Assessment/Training    Lamb Level (Toileting)  supervision  -       User Key  (r) = Recorded By, (t) = Taken By, (c) = Cosigned By    Initials Name Provider Type     Akilah Gutierrez Occupational Therapist        Obj/Interventions     Row Name  03/17/21 1322          Sensory Assessment (Somatosensory)    Sensory Assessment (Somatosensory)  sensation intact  -AH     Row Name 03/17/21 1322          Vision Assessment/Intervention    Visual Impairment/Limitations  WFL  -AH     Row Name 03/17/21 1322          Range of Motion Comprehensive    General Range of Motion  bilateral upper extremity ROM L  -AH     Row Name 03/17/21 1322          Strength Comprehensive (MMT)    Comment, General Manual Muscle Testing (MMT) Assessment  BUE 4-/5  -       User Key  (r) = Recorded By, (t) = Taken By, (c) = Cosigned By    Initials Name Provider Type    Akilah Del Angel Occupational Therapist        Goals/Plan     Row Name 03/17/21 1330          Transfer Goal 1 (OT)    Activity/Assistive Device (Transfer Goal 1, OT)  sit-to-stand/stand-to-sit;walker, rolling  -     [place] Level/Cues Needed (Transfer Goal 1, OT)  modified independence  -     Time Frame (Transfer Goal 1, OT)  by discharge  -     Progress/Outcome (Transfer Goal 1, OT)  goal ongoing  -AH     Row Name 03/17/21 1330          Dressing Goal 1 (OT)    Activity/Device (Dressing Goal 1, OT)  lower body dressing  -     [place]/Cues Needed (Dressing Goal 1, OT)  supervision required  -     Time Frame (Dressing Goal 1, OT)  long term goal (LTG);5 days  -     Progress/Outcome (Dressing Goal 1, OT)  goal ongoing  -AH     Row Name 03/17/21 1330          Toileting Goal 1 (OT)    Activity/Device (Toileting Goal 1, OT)  adjust/manage clothing;perform perineal hygiene  -     [place] Level/Cues Needed (Toileting Goal 1, OT)  standby assist  -     Time Frame (Toileting Goal 1, OT)  by discharge  -     Progress/Outcome (Toileting Goal 1, OT)  goal ongoing  -AH     Row Name 03/17/21 1330          Grooming Goal 1 (OT)    Activity/Device (Grooming Goal 1, OT)  oral care;wash face, hands  -     [place] (Grooming Goal 1, OT)  standby assist  -     Time Frame (Grooming Goal 1, OT)  by  discharge  -     Strategies/Barriers (Grooming Goal 1, OT)  standing at sink side with O2 sats >88%  -     Progress/Outcome (Grooming Goal 1, OT)  goal ongoing  -     Row Name 03/17/21 1330          Strength Goal 1 (OT)    Strength Goal 1 (OT)  Pt will perform UB strengthening ex using theraband for resistance.  -     Time Frame (Strength Goal 1, OT)  by discharge  -     Progress/Outcome (Strength Goal 1, OT)  goal ongoing  -[facility] Name 03/17/21 1330          Therapy Assessment/Plan (OT)    Planned Therapy Interventions (OT)  activity tolerance training;BADL retraining;strengthening exercise;transfer/mobility retraining  -       User Key  (r) = Recorded By, (t) = Taken By, (c) = Cosigned By    Initials Name Provider Type     Akilah Gutierrez Occupational Therapist        Clinical Impression     [facility] Name 03/17/21 1322          Pain Assessment    Additional Documentation  Pain Scale: Numbers Pre/Post-Treatment (Group)  -AH     Row Name 03/17/21 1322          Pain Scale: Numbers Pre/Post-Treatment    Pretreatment Pain Rating  0/10 - no pain  -     Posttreatment Pain Rating  0/10 - no pain  -[facility] Name 03/17/21 1322          Plan of Care Review    Plan of Care Reviewed With  patient  -     Progress  no change  -     Outcome Summary  Pt seen for OT evaluation today.  Pt presents with weakness, soa with activity and decreased independence with self care and functional mobility tasks.  Pt able to get oob with cga and walked 120' with cga using RW.  Pt is expected to benefit from skilled OT to improve his strength and independence with ADL tasks.  Pt plans to return to [facility] upon d/c from hospital  -     Row Name 03/17/21 1322          Therapy Assessment/Plan (OT)    Patient/Family Therapy Goal Statement (OT)  d/c back to [facility]     Rehab Potential (OT)  good, to achieve stated therapy goals  -     Criteria for Skilled Therapeutic Interventions Met (OT)  yes;skilled treatment is  necessary  -     Therapy Frequency (OT)  3 times/wk  -     Row Name 03/17/21 1322          Therapy Plan Review/Discharge Plan (OT)    Anticipated Discharge Disposition (OT)  assisted living recommend therapy at [facility]  -     Row Name 03/17/21 1322          Vital Signs    Pre SpO2 (%)  96  -AH     O2 Delivery Pre Treatment  supplemental O2 4.5L  -AH     Intra SpO2 (%)  90  -AH     O2 Delivery Intra Treatment  supplemental O2 6L  -AH     Post SpO2 (%)  96  -AH     O2 Delivery Post Treatment  supplemental O2 4.5L  -AH     Pre Patient Position  Supine  -     Intra Patient Position  Standing  -     Post Patient Position  Sitting  -[facility] Name 03/17/21 1322          Positioning and Restraints    Pre-Treatment Position  in bed  -     Post Treatment Position  chair  -     In Chair  reclined;call light within reach;encouraged to call for assist  -       User Key  (r) = Recorded By, (t) = Taken By, (c) = Cosigned By    Initials Name Provider Type    Akilah Del Angel Occupational Therapist        Outcome Measures     Row Name 03/17/21 1333          How much help from another is currently needed...    Putting on and taking off regular lower body clothing?  3  -AH     Bathing (including washing, rinsing, and drying)  3  -AH     Toileting (which includes using toilet bed pan or urinal)  3  -AH     Putting on and taking off regular upper body clothing  4  -AH     Taking care of personal grooming (such as brushing teeth)  3  -AH     Eating meals  4  -AH     AM-PAC 6 Clicks Score (OT)  20  -[facility] Name 03/17/21 1333          Functional Assessment    Outcome Measure Options  AM-PAC 6 Clicks Daily Activity (OT)  -       User Key  (r) = Recorded By, (t) = Taken By, (c) = Cosigned By    Initials Name Provider Type    Akilah Del Angel Occupational Therapist        Occupational Therapy Education                 Title: PT OT SLP Therapies (In Progress)     Topic: Occupational Therapy (In Progress)     Point:  ADL training (Done)     Description:   Instruct learner(s) on proper safety adaptation and remediation techniques during self care or transfers.   Instruct in proper use of assistive devices.              Learning Progress Summary           Patient Acceptance, E,TB, VU by  at 3/17/2021 1615    Comment: Role of OT/POC                   Point: Home exercise program (Not Started)     Description:   Instruct learner(s) on appropriate technique for monitoring, assisting and/or progressing therapeutic exercises/activities.              Learner Progress:  Not documented in this visit.          Point: Precautions (Not Started)     Description:   Instruct learner(s) on prescribed precautions during self-care and functional transfers.              Learner Progress:  Not documented in this visit.          Point: Body mechanics (Not Started)     Description:   Instruct learner(s) on proper positioning and spine alignment during self-care, functional mobility activities and/or exercises.              Learner Progress:  Not documented in this visit.                      User Key     Initials Effective Dates Name Provider Type Discipline     03/07/18 -  Akilah Gutierrez Occupational Therapist OT              OT Recommendation and Plan  Planned Therapy Interventions (OT): activity tolerance training, BADL retraining, strengthening exercise, transfer/mobility retraining  Therapy Frequency (OT): 3 times/wk  Plan of Care Review  Plan of Care Reviewed With: patient  Progress: no change  Outcome Summary: Pt seen for OT evaluation today.  Pt presents with weakness, soa with activity and decreased independence with self care and functional mobility tasks.  Pt able to get oob with cga and walked 120' with cga using RW.  Pt is expected to benefit from skilled OT to improve his strength and independence with ADL tasks.  Pt plans to return to [facility] upon d/c from hospital     Time Calculation:   Time Calculation- OT     Row Name  03/17/21 1335             Time Calculation- OT    OT Start Time  1107  -      OT Received On  03/17/21  -      OT Goal Re-Cert Due Date  03/27/21  -        User Key  (r) = Recorded By, (t) = Taken By, (c) = Cosigned By    Initials Name Provider Type    Akilah Del Angel Occupational Therapist        Therapy Charges for Today     Code Description Service Date Service Provider Modifiers Qty    [id number] HC OT EVAL LOW COMPLEXITY 3 3/17/2021 Akilah Gutierrez GO 1               Akilah Gutierrez  3/17/2021

## 2021-03-17 NOTE — PLAN OF CARE
Goal Outcome Evaluation:        Outcome Summary: Pt resting well this shift.  VSS.  Telemetry monitoring continued.  Pt anticipate being able to go back to [facility] in 1-2 days.

## 2021-03-17 NOTE — PLAN OF CARE
Goal Outcome Evaluation:  Plan of Care Reviewed With: patient  Progress: no change  Outcome Summary: Bedside eval of swallow completed.  Pt. seated uprightin bed for po trials.  Oral motor remarkable for edentulous.  He was given trials of regular solid, puree, and thin liquids.  Oral phase was remarkable for slightly extended bolus prep/mastication with regular solid due to edentulous, but WFL.  No overt s/s aspiration or other pharygneal phase dyspahgia with any trial.  Pt. does have third stage dysphagia with GERD, HH, esophageal diliation, and esophageal dysmotility.  He reported that he had an esophagram scheduled but conflicted with this admission to the hospital.  Suggestions given regarding reflux precautions to reduce risk of aspiration from refluxate.  Recommend:  [facility]. [facility] with thin liq as marianela,  2. meds whole as marianela,  3. aspiration precautions, 4. reflux precautions, 5. continue with esophageal assessment upon discharge as MD approves.

## 2021-03-17 NOTE — PROGRESS NOTES
"  CC: Acute respiratory failure.  Hemoptysis.    S: Feels minimally better.  Continues to have cough although has not noticed any further hemoptysis.  Denies any significant fever.  Also complains of shortness of breath with exertion.    ROS: Positive for mild cough, shortness of breath. Denies chest pain, diarrhea or fever.    O: /69 (BP Location: Left arm, Patient Position: Lying)   Pulse 77   Temp 97.5 °F (36.4 °C) (Oral)   Resp (!) 29   Ht 172.7 cm (68\")   Wt 80.3 kg (177 lb 0.5 oz)   SpO2 99%   BMI 26.92 kg/m²     Vital signs reviewed.  General/Constitutional: Mild respiratory distress noted.  Neck: No obvious JVD   Cardiovascular: S1 + S2.  Pacemaker noted.  Lungs/Respiratory: Mild respiratory distress noted.  Bilateral wheezing heard.  Minimal basal crackles noted  Musculoskeletal/Extremities: No significant edema noted.  Neurologic:  AAOx3. Was able to follow commands     Labs: Reviewed.     Results from last 7 days   Lab Units 03/17/21 0523 03/16/21  0556 03/15/21  0833   WBC 10*3/mm3 10.99* 6.68 7.36   HEMOGLOBIN g/dL 11.1* 11.1* 12.9*   HEMATOCRIT % 36.0* 35.1* 41.7   PLATELETS 10*3/mm3 188 175 202       Results from last 7 days   Lab Units 03/17/21  0523 03/16/21  0556 03/15/21  0833   SODIUM mmol/L 141 137 143   POTASSIUM mmol/L 5.1 4.6 4.3   CHLORIDE mmol/L 99 94* 95*   CO2 mmol/L 35.8* 33.9* 38.2*   BUN mg/dL 49* 44* 40*   CREATININE mg/dL 2.24* 2.07* 2.09*   CALCIUM mg/dL 8.8 8.5* 9.4   BILIRUBIN mg/dL  --   --  0.8   ALK PHOS U/L  --   --  62   ALT (SGPT) U/L  --   --  10   AST (SGOT) U/L  --   --  18   GLUCOSE mg/dL 129* 135* 133*   TOTAL PROTEIN g/dL  --   --  6.8   ALBUMIN g/dL 4.00  --  4.60       Results from last 7 days   Lab Units 03/17/21  0523 03/15/21  0833   MAGNESIUM mg/dL 2.8* 2.4   PHOSPHORUS mg/dL 3.6  --              Lab Results   Component Value Date    PROCALCITO 0.08 03/15/2021    PROCALCITO 0.11 12/22/2020    PROCALCITO 0.18 12/13/2019       Lab Results   Component " Value Date    PROBNP 2,693.0 (H) 03/15/2021    PROBNP 4,396 (H) 12/29/2020    PROBNP 4,258.0 (H) 12/22/2020       Lab Results   Component Value Date    CRP <0.50 12/31/2017       Lab Results   Component Value Date    SEDRATE 5 08/14/2018    SEDRATE 4 12/31/2017         Micro: As of March 17, 2021   Lab Results   Component Value Date    RESPCX Rejected 12/16/2019    RESPCX Light growth (2+) Candida albicans (A) 09/16/2017    RESPCX Stenotrophomonas maltophilia (A) 09/16/2017     Lab Results   Component Value Date    BLOODCX No growth at 24 hours 03/15/2021    BLOODCX No growth at 24 hours 03/15/2021    BLOODCX No growth at 5 days 12/13/2020     Lab Results   Component Value Date    URINECX 50,000 CFU/mL Mixed [place] Isolated 03/15/2021    URINECX Final report (A) 12/29/2020    URINECX >100,000 CFU/mL Klebsiella oxytoca (A) 12/13/2020     Lab Results   Component Value Date    FLU Negative 03/18/2017    FLU Negative 03/18/2017     COVID 19:  Lab Results   Component Value Date    COVID19 Not Detected 03/15/2021         sodium chloride, 75 mL/hr      ABG: Reviewed  Lab Results   Component Value Date    PHART 7.356 03/17/2021    FTN8DNS 63.7 (C) 03/17/2021    PO2ART 85.0 03/17/2021    HGBBG 10.9 (L) 12/26/2019    Z6HXQPSQ 97.0 03/17/2021    FCOHB 4.5 08/10/2016    CARBOXYHGB 1.0 03/17/2021    FMETHB 0.3 08/10/2016         CXRay: Latest imaging study was reviewed personally.   Imaging Results (Last 24 Hours)     ** No results found for the last 24 hours. **          Assessment & Recommendations/Plan:   1.  Acute respiratory failure  Likely due to COPD exacerbation.    2.  Acute exacerbation of COPD  Continue nebulized treatments.   Will be started on Pulmicort and Brovana today  Continue Solu-Medrol at the current dose.  May consider decreasing the dose from tomorrow onwards.    3.  Chronic respiratory acidosis  Appears to be on?  CPAP device at home for obstructive sleep apnea.  I will try to obtain information on the  settings, through the DME company.  Requested being placed with case management.  We may end up switching him to BiPAP given chronic respiratory acidosis and his history suggestive of issues with CPAP occasionally, which could suggest intolerance to CPAP.    4.  Obstructive sleep apnea  Last sleep study from 2017, home study, showed moderate obstructive sleep apnea with an AHI of 23/h.    5.  Chronic hypoxemia  Continue oxygen at the current rate.  Was on 5 L/min on 3/16/2021.  Currently on 3-4 L/min, which is close to his baseline.    We have reviewed patient's current orders and changes, if any, have been suggested to primary care team. Plan was also discussed with nursing staff, as necessary.     This document was electronically signed by Slim Toribio MD on 03/17/21 at 09:30 EDT      Dictated utilizing Dragon dictation.

## 2021-03-17 NOTE — PLAN OF CARE
Goal Outcome Evaluation:  Plan of Care Reviewed With: patient  Progress: no change  Outcome Summary: Patient participated in skilled PTevaluation and demonstrates decreased activity tolerance, balance and gait.  Patient is able to walk 120 feet with CGA and RW.  He is expected to benefit from additional PT services while hospitalized and up on discharge to [facility].

## 2021-03-17 NOTE — THERAPY EVALUATION
Acute Care - Speech Language Pathology   Swallow Initial Evaluation  [place]     Patient Name: Jani Pena  : 1950  MRN: [id number]  Today's Date: 3/17/2021               Admit Date: 3/15/2021    Visit Dx:     ICD-10-CM ICD-9-CM   1. Acute respiratory failure, unspecified whether with hypoxia or hypercapnia (CMS/HCC)  J96.00 518.81   2. Chronic renal impairment, unspecified CKD stage  N18.9 585.9   3. Urinary tract infection without hematuria, site unspecified  N39.0 599.0   4. History of COPD  Z87.09 V12.69   5. Oxygen dependent  Z99.81 V46.2     Patient Active Problem List   Diagnosis   • Anxiety   • Atrial fibrillation (CMS/HCC)   • Chronic kidney disease   • Steroid-dependent COPD (CMS/HCC)   • Degeneration of cervical intervertebral disc   • Gastroesophageal reflux disease   • Diverticulosis   • Hemorrhoids   • Hiatal hernia   • Hyperlipidemia   • Kyphosis, acquired   • PEDRO on CPAP   • Benign prostatic hyperplasia with incomplete bladder emptying   • Depression   • History of ventricular septal defect   • Chronic diastolic congestive heart failure (CMS/HCC)   • Coronary artery disease involving native coronary artery of native heart with angina pectoris (CMS/HCC)   • Lumbar radiculopathy   • Anemia   • Cardiac pacemaker in situ   • Sick sinus syndrome (CMS/HCC)   • Acute renal failure (ARF) (CMS/HCC)   • Chronic respiratory failure with hypoxia and hypercapnia (CMS/HCC)   • Hematoma   • Flexural eczema   • Xerosis of skin   • Abnormal urinalysis   • Esophageal dysphagia   • Fall   • Presbyesophagus   • Sepsis secondary to UTI (CMS/HCC)   • Acute respiratory failure (CMS/HCC)   • Acute on chronic respiratory failure with hypercapnia (CMS/HCC)   • Overweight (BMI 25.0-29.9)     Past Medical History:   Diagnosis Date   • Abnormal liver enzymes    • Anemia    • Anxiety    • Arthritis    • Atherosclerotic heart disease of native coronary artery without angina pectoris    • Atrial fibrillation  (CMS/HCC)    • Back pain    • BPH (benign prostatic hyperplasia)    • Cataract, bilateral    • Chest pain     2-3 MONTHS AGO.  PER PT, HAS ADDRESSED WITH CARDIOLOGIST.  STATES HAS NTG PRN.   • CHF (congestive heart failure) (CMS/HCC)    • Chronic bronchitis (CMS/HCC)    • Chronic kidney disease    • COPD (chronic obstructive pulmonary disease) (CMS/HCC)    • Degeneration of cervical intervertebral disc    • Depression    • Diverticulosis    • Dyspnea    • Esophageal reflux    • Esophagitis    • Gastritis    • Hematuria    • Hemorrhoids    • Hiatal hernia    • History of arthritis    • History of nuclear stress test     UNSURE OF DATE   • History of peripheral neuropathy    • History of ventricular septal defect    • History of ventricular septal defect    • Hyperlipidemia    • Hypertension    • Impaired functional mobility, balance, gait, and endurance    • Infectious diarrhea    • Kyphosis, acquired    • Lumbar radiculopathy    • Mitral and aortic valve disease    • Nephrolithiasis    • Phimosis    • Problems with swallowing     WITH FOOD   • Respiratory failure (CMS/HCC)    • Sleep apnea     BIPAP, too awkward to bring   • TIA (transient ischemic attack)     X3.   2014   • Ventral hernia    • Wears glasses     FOR READING     Past Surgical History:   Procedure Laterality Date   • CARDIAC CATHETERIZATION     • CARDIAC CATHETERIZATION N/A 5/24/2018    Procedure: Left Heart Cath;  Surgeon: Edouard Robertson MD;  Location:  [facility];  Service: Cardiovascular   • CARDIAC ELECTROPHYSIOLOGY PROCEDURE N/A 1/31/2019    Procedure: PACEMAKER IMPLANTATION- DC;  Surgeon: Omar Encinas DO;  Location:  [facility];  Service: Cardiology   • COLONOSCOPY N/A 12/28/2019    Procedure: COLONOSCOPY WITH HOT POLYPECTOMY;  Surgeon: Celio Maldonado MD;  [facility]:  [facility];  Service: Gastroenterology   • ENDOSCOPY N/A 1/26/2019    Procedure: ESOPHAGOGASTRODUODENOSCOPY;  Surgeon: Brunner, Mark I,  MD;  Location:  [facility];  Service: Gastroenterology   • ENDOSCOPY N/A 12/27/2019    Procedure: ESOPHAGOGASTRODUODENOSCOPY;  Surgeon: Valdemar Mcdoanld MD;  Location: [facility] ENDOSCOPY;  Service: General   • ENDOSCOPY N/A 10/20/2020    Procedure: ESOPHAGOGASTRODUODENOSCOPY WITH DILATION AND COLD FORCEP BIOPSY;  Surgeon: Cale Madrid MD;  Location: [facility] ENDOSCOPY;  Service: Gastroenterology;  Laterality: N/A;   • HERNIA REPAIR     • ORTHOPEDIC SURGERY Left     Left hip arthroplasty   • PACEMAKER IMPLANTATION  01/31/2019   • SUPRAPUBIC CATHETER INSERTION      History of Percutaneous Catheter Placement Into Ureter   • THROAT SURGERY      FOR SLEEP APNEA   • VSD REPAIR      History of VSD Repair By Patch Closure        SWALLOW EVALUATION (last 72 hours)      SLP Adult Swallow Evaluation     Row Name 03/17/21 1030                   Rehab Evaluation    Document Type  evaluation  -TM        Subjective Information  complains of difficulty swallow with esophageal problems  -TM        Patient Observations  cooperative;alert  -TM        Patient/Family/Caregiver Comments/Observations  no family present  -TM        Patient Effort  good  -TM           General Information    Patient Profile Reviewed  yes  -TM        Pertinent History Of Current Problem  third stage dysphagia: GERD, HH, esoph. dysmotility, esoph dilitation  -TM        Current Method of Nutrition  mechanical soft, no mixed consistencies;thin liquids  -TM        Precautions/Limitations, Vision  WFL  -TM        Precautions/Limitations, Hearing  WFL  -TM        Prior Level of Function-Communication  WFL  -TM        Prior Level of Function-Swallowing  no diet consistency restrictions;esophageal concerns  -TM        Plans/Goals Discussed with  patient;other (see comments) RN  -TM        Barriers to Rehab  previous functional deficit esophageal dysphagia  -TM           Pain    Additional Documentation  Pain Scale: Numbers Pre/Post-Treatment (Group)  -TM            Pain Scale: Numbers Pre/Post-Treatment    Pretreatment Pain Rating  0/10 - no pain  -TM        Posttreatment Pain Rating  0/10 - no pain  -TM           Oral Motor Structure and Function    Oral Lesions or Structural Abnormalities and/or variants  none identified  -TM        Dentition Assessment  edentulous  -TM        Secretion Management  WNL/WFL  -TM        Mucosal Quality  moist, healthy  -TM        Volitional Cough  WFL  -TM           Oral Musculature and Cranial Nerve Assessment    Oral Motor General Assessment  WFL  -TM           General Eating/Swallowing Observations    Respiratory Support Currently in Use  nasal cannula  -TM        Eating/Swallowing Skills  fed by SLP  -TM        Positioning During Eating  upright in bed  -TM        Utensils Used  spoon;straw  -TM        Consistencies Trialed  thin liquids;pureed;regular textures  -TM        Pre SpO2 (%)  98  -TM        Post SpO2 (%)  96  -TM           Respiratory    Respiratory Status  WFL  -TM           Clinical Swallow Eval    Oral Prep Phase  WFL  -TM        Oral Transit  WFL  -TM        Oral Residue  WFL  -TM        Pharyngeal Phase  no overt signs/symptoms of pharyngeal impairment  -TM        Esophageal Phase  suspected esophageal impairment  -TM        Clinical Swallow Evaluation Summary  Bedside eval of swallow completed.  Pt. seated uprightin bed for po trials.  Oral motor remarkable for edentulous.  He was given trials of regular solid, puree, and thin liquids.  Oral phase was remarkable for slightly extended bolus prep/mastication with regular solid due to edentulous, but WFL.  No overt s/s aspiration or other pharygneal phase dyspahgia with any trial.  Pt. does have third stage dysphagia with GERD, HH, esophageal diliation, and esophageal dysmotility.  He reported that he had an esophagram scheduled but conflicted with this admission to the hospital.  Suggestions given regarding reflux precautions to reduce risk of aspiration from  refluxate.  Recommend:  1. continue [facility] soft diet with thin liq as marianela,  2. meds whole as marianela,  3. aspiration precautions, 4. reflux precautions, 5. continue with esophageal assessment upon discharge as MD approves.    -TM           Esophageal Phase Concerns    Esophageal Phase Concerns  other (see comments) GERD, HH, esophageal dilitation & dysmotility  -TM        Esophageal Phase Concerns, Comment  aspiration risk from refluxate  -TM           Clinical Impression    Barriers to Overall Progress (SLP)  third stage dysphagia  -TM        SLP Swallowing Diagnosis  esophageal dysphagia  -TM        Functional Impact  risk of aspiration/pneumonia from refluxate  -TM        Swallow Criteria for Skilled Therapeutic Interventions Met  no problems identified which require skilled intervention  -TM           Recommendations    Therapy Frequency (Swallow)  evaluation only  -TM        SLP Diet Recommendation  mechanical soft with no mixed consistencies;thin liquids  -TM        Recommended Diagnostics  No further SLP services recommended  -TM        Recommended Precautions and Strategies  upright posture during/after eating;general aspiration precautions;reflux precautions  -TM        Oral Care Recommendations  Oral Care BID/PRN  -TM        SLP Rec. for Method of Medication Administration  meds whole;with thin liquids;with pudding or applesauce;as tolerated  -        Monitor for Signs of Aspiration  notify SLP if any concerns;yes;right lower lobe infiltrates;cough;gurgly voice;throat clearing;fever  -TM        Anticipated Discharge Disposition (SLP)  unknown  -TM        Demonstrates Need for Referral to Another Service  dedicated esophageal assessment;gastroenterology  -TM          User Key  (r) = Recorded By, (t) = Taken By, (c) = Cosigned By    Initials Name Effective Dates     DeniceEmily roman 03/07/18 -           EDUCATION  The patient has been educated in the following areas:   Dysphagia (Swallowing Impairment) Oral  Care/Hydration.    SLP Recommendation and Plan  SLP Swallowing Diagnosis: esophageal dysphagia  SLP Diet Recommendation: mechanical soft with no mixed consistencies, thin liquids  Recommended Precautions and Strategies: upright posture during/after eating, general aspiration precautions, reflux precautions  SLP Rec. for Method of Medication Administration: meds whole, with thin liquids, with pudding or applesauce, as tolerated     Monitor for Signs of Aspiration: notify SLP if any concerns, yes, right lower lobe infiltrates, cough, gurgly voice, throat clearing, fever  Recommended Diagnostics: No further SLP services recommended  Swallow Criteria for Skilled Therapeutic Interventions Met: no problems identified which require skilled intervention  Anticipated Discharge Disposition (SLP): unknown     Therapy Frequency (Swallow): evaluation only     Demonstrates Need for Referral to Another Service: dedicated esophageal assessment, gastroenterology                      Plan of Care Reviewed With: patient  Progress: no change  Outcome Summary: Bedside eval of swallow completed.  Pt. seated uprightin bed for po trials.  Oral motor remarkable for edentulous.  He was given trials of regular solid, puree, and thin liquids.  Oral phase was remarkable for slightly extended bolus prep/mastication with regular solid due to edentulous, but WFL.  No overt s/s aspiration or other pharygneal phase dyspahgia with any trial.  Pt. does have third stage dysphagia with GERD, HH, esophageal diliation, and esophageal dysmotility.  He reported that he had an esophagram scheduled but conflicted with this admission to the hospital.  Suggestions given regarding reflux precautions to reduce risk of aspiration from refluxate.  Recommend:  1. continue [facility] soft diet with thin liq as marianela,  2. meds whole as marianela,  3. aspiration precautions, 4. reflux precautions, 5. continue with esophageal assessment upon discharge as MD approves.           Time  Calculation:   Time Calculation- SLP     Row Name 03/17/21 1100             Time Calculation- SLP    SLP Start Time  1030  -TM      SLP Stop Time  1039  -TM      SLP Time Calculation (min)  9 min  -TM      SLP Received On  03/17/21  -        User Key  (r) = Recorded By, (t) = Taken By, (c) = Cosigned By    Initials Name Provider Type     Emily Galdamez Speech and Language Pathologist          Therapy Charges for Today     Code Description Service Date Service Provider Modifiers Qty    [id number]  ST EVAL ORAL PHARYNG SWALLOW 4 3/17/2021 Emily Galdamez GN 1               Emily Galdamez  3/17/2021

## 2021-03-17 NOTE — PLAN OF CARE
Goal Outcome Evaluation:  Patient wearing Bipap through the night and during the day when anxious and short of air.

## 2021-03-17 NOTE — PROGRESS NOTES
[facility]    PROGRESS NOTE    Name:  Jani Pena   Age:  70 y.o.  Sex:  male  :  1950  MRN:  [id number]   Visit Number:  [id number]  Admission Date:  3/15/2021  Date Of Service:  21  Primary Care Physician:  Miguel Rojas MD     LOS: 0 days :  Patient Care Team:  Miguel Rojas MD as PCP - General (Internal Medicine)  Ulysses Bass MD as Cardiologist (Cardiology):    History taken from:     patient chart    Chief Complaint:      Dyspnea    Subjective:    Interval History:     Patient seen and examined again today.    Patient 70-year-old male with paroxysmal A. fib lesion, chronic kidney disease stage IIIb, GERD, chronic hypoxic respiratory failure on home O2, chronic hypercapnic respiratory failure, sick sinus syndrome with pacemaker comes with shortness of breath and hemoptysis.  He has been fatigued and weak.  He continues to have shortness of breath.  He denies chest pressure, nausea, vomiting.    He has COPD and is followed by pulmonology as outpatient.  He has no history of atrial fibrillation but is not on anticoagulation but is on aspirin Plavix.  Pulmonology is following.  Patient continues on Solu-Medrol, Brovana and Pulmicort have been added.  We will wean steroids over the next couple of days.  Try to wean oxygen down.    He has esophageal motility issues was referred to manometry at [facility] in [place], this will need to be rescheduled as an outpatient.    CT scan of the chest showed no evidence of PE or dissection or pneumonia.  Patient was admitted placed on Rocephin, Zithromax, Solu-Medrol.  He feels much better.  His blood gases have improved he is no longer acidotic.     Patient has coronary artery disease, his troponin was minimally elevated and trended down.  Suspect this is supply demand mismatch.    Review of Systems:     All systems were reviewed and negative except for:  Respiratory: positive for  shortness of air    Objective:    Vital  Signs:    Temp:  [97.5 °F (36.4 °C)-98.6 °F (37 °C)] 98 °F (36.7 °C)  Heart Rate:  [] 84  Resp:  [17-29] 19  BP: (108-132)/(69-83) 132/70    Physical Exam:    General: Alert and orient x4, mild distress  Heart: Paced rhythm.  No murmur rub or thrill noted.  Lungs: Decreased present bilaterally without use of accessory muscles respiration.  Abdomen: Soft/nontender/nondistended.  No HSM noted.  MSK: 4/5 strength in upper/lower extremity bilaterally.     Results Review:      I reviewed the patient's new clinical results.    Labs:    Results from last 7 days   Lab Units 03/17/21  0523 03/16/21  0556 03/15/21  0833   SODIUM mmol/L 141 137 143   POTASSIUM mmol/L 5.1 4.6 4.3   CHLORIDE mmol/L 99 94* 95*   CO2 mmol/L 35.8* 33.9* 38.2*   BUN mg/dL 49* 44* 40*   CREATININE mg/dL 2.24* 2.07* 2.09*   CALCIUM mg/dL 8.8 8.5* 9.4   BILIRUBIN mg/dL  --   --  0.8   ALK PHOS U/L  --   --  62   ALT (SGPT) U/L  --   --  10   AST (SGOT) U/L  --   --  18   GLUCOSE mg/dL 129* 135* 133*     Results from last 7 days   Lab Units 03/17/21  0523 03/16/21  0556 03/15/21  0833   WBC 10*3/mm3 10.99* 6.68 7.36   HEMOGLOBIN g/dL 11.1* 11.1* 12.9*   HEMATOCRIT % 36.0* 35.1* 41.7   PLATELETS 10*3/mm3 188 175 202         Results from last 7 days   Lab Units 03/15/21  2239 03/15/21  1650 03/15/21  0833   CK TOTAL U/L  --   --  77   TROPONIN T ng/mL 0.037* 0.040* 0.065*     Results from last 7 days   Lab Units 03/15/21  1007 03/15/21  0924 03/15/21  0923   BLOODCX   --  No growth at 2 days No growth at 2 days   URINECX  50,000 CFU/mL Mixed [place] Isolated  --   --      Results from last 7 days   Lab Units 03/17/21  0739   PH, ARTERIAL pH units 7.356   PO2 ART mm Hg 85.0   PCO2, ARTERIAL mm Hg 63.7*   HCO3 ART mmol/L 35.6*        Radiology:    Imaging Results (Last 24 Hours)     ** No results found for the last 24 hours. **          Medication Review:     amLODIPine, 5 mg, Oral, Q24H  arformoterol, 15 mcg, Nebulization, BID - RT  aspirin, 81 mg,  Oral, Daily  atorvastatin, 10 mg, Oral, Q PM  azithromycin, 500 mg, Intravenous, Q24H  budesonide, 0.5 mg, Nebulization, BID - RT  cefTRIAXone, 1 g, Intravenous, Q24H  clopidogrel, 75 mg, Oral, Daily  escitalopram, 10 mg, Oral, Daily  famotidine, 40 mg, Oral, Daily  ferrous sulfate, 324 mg, Oral, Daily With Breakfast  finasteride, 5 mg, Oral, Daily  ipratropium-albuterol, 3 mL, Nebulization, Q6H - RT  lidocaine, 1 patch, Transdermal, Q24H  melatonin, 5 mg, Oral, Nightly  methylPREDNISolone sodium succinate, 40 mg, Intravenous, Q8H  mirtazapine, 15 mg, Oral, Nightly  oxybutynin, 5 mg, Oral, Nightly  pantoprazole, 40 mg, Oral, QAM AC  polyethylene glycol, 17 g, Oral, Daily  potassium chloride, 10 mEq, Oral, Daily  sodium chloride, 3 mL, Intravenous, Q12H  tamsulosin, 0.4 mg, Oral, Nightly  traZODone, 50 mg, Oral, Q PM        sodium chloride, 75 mL/hr, Last Rate: 75 mL/hr (03/17/21 1352)        Assessment:    Problem List Items Addressed This Visit        Other    Acute respiratory failure (CMS/[facility]) - Primary      Other Visit Diagnoses     Chronic renal impairment, unspecified CKD stage        Urinary tract infection without hematuria, site unspecified        History of COPD        Oxygen dependent              1.  Acute on chronic hypoxic and hypercapnic respiratory failure.  2.  COPD with exacerbation  3.  Possible UTI, culture contaminated.  4.  Elevation in troponin, type II MI, supply demand mismatch  5.  Sick sinus syndrome pacemaker in place  6.  Chronic kidney disease stage IIIb  7.  Atrial fibrillation, currently rate controlled.  Not on anticoagulation.  8.  Suspected esophageal dysmotility, referred for esophageal manometry by Dr. Tinsley at [facility].  9.  Coronary artery disease on medical management  10.  Probable acute bronchitis  11.  Fatty infiltration of the liver  12.  Hemoptysis      Plan:    Continue Solu-Medrol, Rocephin and Zithromax.  Pulmicort and Brovana have been added by pulmonology .   PT, OT, speech therapy to follow.  Continue with BiPAP at night.  Check lab work in a.m. Patient appears to be improving, hopefully patient can return to [facility] in the next couple of days.  Further recommendations will pend clinical course.    Joshua Mendez DO  03/17/21  14:37 EDT

## 2021-03-17 NOTE — PROGRESS NOTES
Continued Stay Note   [facility]     Patient Name: Jani Pena  MRN: [id number]  Today's Date: 3/17/2021    Admit Date: 3/15/2021    Discharge Plan     Row Name 03/17/21 1409       Plan    Plan Called to Teresa at [facility] and informed would not be discharged today.  Called to Jacqueline at [facility] and has a BIPAP not a CPAP Orders as of 2018 is 20/10 with inline o2 at 5 liters.        Discharge Codes    No documentation.       Expected Discharge Date and Time     Expected Discharge Date Expected Discharge Time    Mar 17, 2021             Belgica Barry RN

## 2021-03-17 NOTE — PLAN OF CARE
Goal Outcome Evaluation:  Plan of Care Reviewed With: patient  Progress: no change  Outcome Summary: Pt seen for OT evaluation today.  Pt presents with weakness, soa with activity and decreased independence with self care and functional mobility tasks.  Pt able to get oob with cga and walked 120' with cga using RW.  Pt is expected to benefit from skilled OT to improve his strength and independence with ADL tasks.  Pt plans to return to [facility] upon d/c from hospital

## 2021-03-18 NOTE — THERAPY TREATMENT NOTE
Patient is very tired and has worked with OT and he is asleep when PT arrived.  He is not feeling well and would like to hold PT today.  PT will attempt again tomorrow.

## 2021-03-18 NOTE — CONSULTS
In Patient Consult      Date of Consultation: 2021  Patient Name: Jani Pena  MRN: [id number]  : 1950     Referring provider: Joshua Mendez DO    Primary care provider:  Miguel Rojas MD    Reason for consultation: esophageal dysmotility    History of Present Illness:   Mr. Pena is a 70-year-old male with past medical history of COPD with recent exacerbation, CHF, atrial fibrillation on Plavix and aspirin and known esophageal dysmotility.  He presented to the ED on 3/15/2021 due to coughing and hemoptysis.  He was admitted to the hospital, placed on BiPAP and antibiotics and admitted to the hospital for further management.    He has had difficulty swallowing for several years now.  He has had several EGDs in the past with dilatations of the esophagus without significant improvement.  He would notice temporary improvement in dysphagia for approximately 2 to 3 months after dilatation.  He has been managing this condition over the past few months with avoiding breads and meats.  He is careful with eating and tries to eat slowly.  He had great difficulty this morning while eating scrambled eggs and had regurgitation.  He feels that the food will get stuck and points to his neck or mid chest area.  He denies any current acid reflux or heartburn symptoms.  He does take Protonix 40 mg once daily.  His last EGD was completed by Dr. Madrid on 10/20/2020 and showed 1 cm hiatal hernia, subtle age-related concentric rings and tertiary contractions, dilatation was performed.  He was scheduled in [place] to have esophageal manometry testing but rescheduled at first due to weather and had it rescheduled to 3/16, planned to complete this procedure before he was admitted to the hospital this week.    He denies any current abdominal pain, nausea or overt vomiting.  Denies any current rectal bleeding or melena.  He has not noticed any weight loss.  He does have some constipation which has  "become worse since being hospitalized.  He has not had a bowel movement in the past 5 to 6 days.  He usually takes MiraLAX as needed at home and he received his first dose of MiraLAX yesterday without any bowel movement since.     The patient had EGD in January 2019 by Dr. Oneil with gastroparesis and presbyesophagus noted. He had another EGD in [place] in October 2019 and had esophagus \"stretched\" (we do not have those records). The patient had EGD in December 2019 by Dr. Moe that was  \"normal\". The patient had colonoscopy in December 2019 by Dr. Moe with 1 benign mucosal tag removed, otherwise unremarkable.    Subjective     Past Medical History:   Diagnosis Date   • Abnormal liver enzymes    • Anemia    • Anxiety    • Arthritis    • Atherosclerotic heart disease of native coronary artery without angina pectoris    • Atrial fibrillation (CMS/HCC)    • Back pain    • BPH (benign prostatic hyperplasia)    • Cataract, bilateral    • Chest pain     2-3 MONTHS AGO.  PER PT, HAS ADDRESSED WITH CARDIOLOGIST.  STATES HAS NTG PRN.   • CHF (congestive heart failure) (CMS/HCC)    • Chronic bronchitis (CMS/HCC)    • Chronic kidney disease    • COPD (chronic obstructive pulmonary disease) (CMS/HCC)    • Degeneration of cervical intervertebral disc    • Depression    • Diverticulosis    • Dyspnea    • Esophageal reflux    • Esophagitis    • Gastritis    • Hematuria    • Hemorrhoids    • Hiatal hernia    • History of arthritis    • History of nuclear stress test     UNSURE OF DATE   • History of peripheral neuropathy    • History of ventricular septal defect    • History of ventricular septal defect    • Hyperlipidemia    • Hypertension    • Impaired functional mobility, balance, gait, and endurance    • Infectious diarrhea    • Kyphosis, acquired    • Lumbar radiculopathy    • Mitral and aortic valve disease    • Nephrolithiasis    • Phimosis    • Problems with swallowing     WITH FOOD   • Respiratory failure (CMS/HCC)  "   • Sleep apnea     BIPAP, too awkward to bring   • TIA (transient ischemic attack)     X3.   2014   • Ventral hernia    • Wears glasses     FOR READING       Past Surgical History:   Procedure Laterality Date   • CARDIAC CATHETERIZATION     • CARDIAC CATHETERIZATION N/A 5/24/2018    Procedure: Left Heart Cath;  Surgeon: Edouard Robertson MD;  Location:  [facility];  Service: Cardiovascular   • CARDIAC ELECTROPHYSIOLOGY PROCEDURE N/A 1/31/2019    Procedure: PACEMAKER IMPLANTATION- DC;  Surgeon: Omar Encinas DO;  Location:  [facility];  Service: Cardiology   • COLONOSCOPY N/A 12/28/2019    Procedure: COLONOSCOPY WITH HOT POLYPECTOMY;  Surgeon: Celio Maldonado MD;  [facility]:  [facility];  Service: Gastroenterology   • ENDOSCOPY N/A 1/26/2019    Procedure: ESOPHAGOGASTRODUODENOSCOPY;  Surgeon: Brunner, Mark I, MD;  Location:  [facility];  Service: Gastroenterology   • ENDOSCOPY N/A 12/27/2019    Procedure: ESOPHAGOGASTRODUODENOSCOPY;  Surgeon: Valdemar Mcdonald MD;  Location:  [facility];  Service: General   • ENDOSCOPY N/A 10/20/2020    Procedure: ESOPHAGOGASTRODUODENOSCOPY WITH DILATION AND COLD FORCEP BIOPSY;  Surgeon: Cale Madrid MD;  Location:  [facility];  Service: Gastroenterology;  Laterality: N/A;   • HERNIA REPAIR     • ORTHOPEDIC SURGERY Left     Left hip arthroplasty   • PACEMAKER IMPLANTATION  01/31/2019   • SUPRAPUBIC CATHETER INSERTION      History of Percutaneous Catheter Placement Into Ureter   • THROAT SURGERY      FOR SLEEP APNEA   • VSD REPAIR      History of VSD Repair By Patch Closure       Family History   Problem Relation Age of Onset   • Other Father         CABG   • Coronary artery disease Father    • Colon cancer Neg Hx        Social History     Socioeconomic History   • Marital status:      Spouse name: Not on file   • Number of children: Not on file   • Years of education: Not on file   • Highest education level: Not  on file   Tobacco Use   • Smoking status: Former Smoker     Packs/day: 3.00     Years: 30.00     Pack years: 90.00     Types: Cigarettes     Quit date: 2017     Years since quittin.2   • Smokeless tobacco: Never Used   Vaping Use   • Vaping Use: Never used   Substance and Sexual Activity   • Alcohol use: No   • Drug use: No   • Sexual activity: Defer         Current Facility-Administered Medications:   •  acetaminophen (TYLENOL) tablet 650 mg, 650 mg, Oral, Q4H PRN, 650 mg at 21 0540 **OR** acetaminophen (TYLENOL) 160 MG/5ML solution 650 mg, 650 mg, Oral, Q4H PRN, 650 mg at 21 0612 **OR** acetaminophen (TYLENOL) suppository 650 mg, 650 mg, Rectal, Q4H PRN, Bowling-Fredrick, Monse, APRN  •  albuterol (PROVENTIL) nebulizer solution 0.083% 2.5 mg/3mL, 2.5 mg, Nebulization, Q6H PRN, Chika Rinaldi MD  •  ALPRAZolam (XANAX) tablet 0.25 mg, 0.25 mg, Oral, BID PRN, Db Luis, DO, 0.25 mg at 21  •  amLODIPine (NORVASC) tablet 5 mg, 5 mg, Oral, Q24H, Bowling-Fredrick, Monse, APRN, 5 mg at 21 0838  •  arformoterol (BROVANA) nebulizer solution 15 mcg, 15 mcg, Nebulization, BID - RT, Slim Toribio MD, 15 mcg at 21 0654  •  aspirin chewable tablet 81 mg, 81 mg, Oral, Daily, Bowling-Fredrick, Monse, APRN, 81 mg at 21 0838  •  atorvastatin (LIPITOR) tablet 10 mg, 10 mg, Oral, Q PM, Bowling-Fredrick, Monse, APRN, 10 mg at 21  •  benzonatate (TESSALON) capsule 200 mg, 200 mg, Oral, TID PRN, Db Lius, DO, 200 mg at 21  •  budesonide (PULMICORT) nebulizer solution 0.5 mg, 0.5 mg, Nebulization, BID - RT, Slim Toribio MD, 0.5 mg at 21 0654  •  clopidogrel (PLAVIX) tablet 75 mg, 75 mg, Oral, Daily, Monse Evans APRN, 75 mg at 21 0838  •  cyclobenzaprine (FLEXERIL) tablet 5 mg, 5 mg, Oral, TID PRN, Joshua Mendez, DO  •  diphenhydrAMINE-zinc acetate 2-0.1 % cream, , Topical, TID PRN, Joshua Mendez,  DO  •  docusate sodium (COLACE) capsule 100 mg, 100 mg, Oral, BID PRN, Bowling-Fredrick, Monse, APRN, 100 mg at 03/17/21 0933  •  escitalopram (LEXAPRO) tablet 10 mg, 10 mg, Oral, Daily, Bowling-Fredrick, Monse, APRN, 10 mg at 03/18/21 0838  •  famotidine (PEPCID) tablet 40 mg, 40 mg, Oral, Daily, Bowling-Fredrick, Monse, APRN, 40 mg at 03/18/21 0838  •  ferrous sulfate EC tablet 324 mg, 324 mg, Oral, Daily With Breakfast, [place]-Fredrick, Monse, APRN, 324 mg at 03/18/21 0838  •  finasteride (PROSCAR) tablet 5 mg, 5 mg, Oral, Daily, Bowling-Fredrick, Monse, APRN, 5 mg at 03/18/21 0838  •  HYDROcodone-acetaminophen (NORCO) 5-325 MG per tablet 1 tablet, 1 tablet, Oral, Q8H PRN, Tonya-Fredrick, Monse, APRN, 1 tablet at 03/16/21 1224  •  ipratropium-albuterol (DUO-NEB) nebulizer solution 3 mL, 3 mL, Nebulization, Q6H - RT, Cristina, Monse, APRN, 3 mL at 03/18/21 0654  •  lidocaine (LIDODERM) 5 % 1 patch, 1 patch, Transdermal, Q24H, Monse Evans, APRN, 1 patch at 03/17/21 1825  •  magnesium citrate solution 15 mL, 15 mL, Oral, Daily PRN, Joshua Mendez, DO  •  melatonin tablet 5 mg, 5 mg, Oral, Nightly, Joshua Mendez, DO, 5 mg at 03/17/21 2046  •  mirtazapine (REMERON) tablet 15 mg, 15 mg, Oral, Nightly, Joshua Mendez, DO, 15 mg at 03/17/21 2046  •  ondansetron (ZOFRAN) injection 4 mg, 4 mg, Intravenous, Q6H PRN, Tonya-Fredrick, Monse, APRN  •  oxybutynin (DITROPAN) tablet 5 mg, 5 mg, Oral, Nightly, Monse Evans APRN, 5 mg at 03/17/21 2046  •  pantoprazole (PROTONIX) EC tablet 40 mg, 40 mg, Oral, QAM AC, Joshua Mendez, , 40 mg at 03/18/21 0630  •  polyethylene glycol (MIRALAX) packet 17 g, 17 g, Oral, Daily, Joshua Mendez DO, 17 g at 03/18/21 0838  •  potassium chloride (MICRO-K) CR capsule 10 mEq, 10 mEq, Oral, Daily, Monse Evans APRN, 10 mEq at 03/18/21 0838  •  predniSONE (DELTASONE) tablet 40 mg, 40 mg, Oral, Daily With Breakfast, Chika Rinaldi MD,  40 mg at 03/18/21 1126  •  sodium chloride 0.9 % flush 10 mL, 10 mL, Intravenous, PRN, Edouard Durán MD  •  [COMPLETED] Insert peripheral IV, , , Once **AND** sodium chloride 0.9 % flush 10 mL, 10 mL, Intravenous, PRN, Hanna Silva, APRN  •  sodium chloride 0.9 % flush 3 mL, 3 mL, Intravenous, Q12H, Bowling-Fredrick, Monse, APRN, 3 mL at 03/18/21 0838  •  sodium chloride 0.9 % flush 3-10 mL, 3-10 mL, Intravenous, PRN, Bowling-Fredrick, Monse, APRN  •  tamsulosin (FLOMAX) 24 hr capsule 0.4 mg, 0.4 mg, Oral, Nightly, Bowling-Fredrick, Monse, APRN, 0.4 mg at 03/17/21 2046  •  traZODone (DESYREL) tablet 50 mg, 50 mg, Oral, Q PM, Bowling-Fredrick, Monse, APRN, 50 mg at 03/17/21 2046    Allergies   Allergen Reactions   • Levaquin [Levofloxacin] Hives   • Sulfa Antibiotics Nausea And Vomiting       Review of Systems   Constitutional: Positive for fatigue. Negative for appetite change, chills and fever.   HENT: Positive for trouble swallowing.    Eyes: Negative.    Respiratory: Positive for cough and shortness of breath.    Gastrointestinal: Positive for constipation and GERD (controlled with med). Negative for abdominal distention, abdominal pain, anal bleeding, nausea and vomiting.   Endocrine: Negative.    Genitourinary: Negative.    Musculoskeletal: Positive for arthralgias.   Skin: Negative for color change.   Allergic/Immunologic: Negative.    Neurological: Negative for memory problem.   Hematological: Bruises/bleeds easily.   Psychiatric/Behavioral: Negative for depressed mood.        The following portions of the patient's history were reviewed and updated as appropriate: allergies, current medications, past family history, past medical history, past social history, past surgical history and problem list.    Objective     Vitals:    03/18/21 0326 03/18/21 0514 03/18/21 0654 03/18/21 0700   BP: 118/74   128/68   BP Location: Left arm      Patient Position: Lying   Lying   Pulse: 77 71 72 76   Resp: 18 18    Temp: 96.6 °F (35.9 °C)   97.8 °F (36.6 °C)   TempSrc: Axillary   Oral   SpO2: 96% 97% 100% 98%   Weight:  83.6 kg (184 lb 4.9 oz)     Height:           Physical Exam  Vitals reviewed.   Constitutional:       General: He is not in acute distress.     Appearance: Normal appearance. He is well-developed. He is not ill-appearing or diaphoretic.   HENT:      Head: Normocephalic and atraumatic.      Right Ear: External ear normal.      Left Ear: External ear normal.      Nose: Nose normal.   Eyes:      General: No scleral icterus.        Right eye: No discharge.         Left eye: No discharge.      Comments: Ectropion, bilateral   Neck:      Vascular: No JVD.   Cardiovascular:      Rate and Rhythm: Normal rate and regular rhythm.      Heart sounds: Normal heart sounds. No murmur heard.   No friction rub. No gallop.       Comments: Pacemaker noted  Pulmonary:      Effort: Respiratory distress (mild) present.      Breath sounds: Wheezing present. No rales.      Comments: Increased WOB  Chest:      Chest wall: No tenderness.   Abdominal:      General: Bowel sounds are normal. There is no distension.      Palpations: Abdomen is soft. There is no mass.      Tenderness: There is abdominal tenderness (generalized, mild). There is no guarding.   Musculoskeletal:         General: No deformity. Normal range of motion.      Cervical back: Normal range of motion.   Skin:     General: Skin is warm and dry.      Findings: No erythema or rash.   Neurological:      Mental Status: He is alert and oriented to person, place, and time.      Coordination: Coordination normal.   Psychiatric:         Mood and Affect: Mood normal.         Behavior: Behavior normal.         Thought Content: Thought content normal.         Judgment: Judgment normal.         Results from last 7 days   Lab Units 03/18/21  0842 03/17/21  0523 03/16/21  0556 03/15/21  0833   SODIUM mmol/L 137 141 137 143   POTASSIUM mmol/L 5.0 5.1 4.6 4.3   CHLORIDE mmol/L 96* 99  94* 95*   CO2 mmol/L 30.3* 35.8* 33.9* 38.2*   BUN mg/dL 47* 49* 44* 40*   CREATININE mg/dL 2.00* 2.24* 2.07* 2.09*   CALCIUM mg/dL 8.6 8.8 8.5* 9.4   ALBUMIN g/dL 4.40 4.00  --  4.60   BILIRUBIN mg/dL  --   --   --  0.8   ALK PHOS U/L  --   --   --  62   ALT (SGPT) U/L  --   --   --  10   AST (SGOT) U/L  --   --   --  18   GLUCOSE mg/dL 131* 129* 135* 133*   WBC 10*3/mm3 13.32* 10.99* 6.68 7.36   HEMOGLOBIN g/dL 12.3* 11.1* 11.1* 12.9*   PLATELETS 10*3/mm3 205 188 175 202       Imaging Results (Last 24 Hours)     Procedure Component Value Units Date/Time    XR Chest 1 View [[id number]] Collected: 03/18/21 0832     Updated: 03/18/21 0836    Narrative:      PROCEDURE: XR CHEST 1 VW-     HISTORY: dyspnea; J96.00-Acute respiratory failure, unspecified whether  with hypoxia or hypercapnia; N18.9-Chronic kidney disease, unspecified;  N39.0-Urinary tract infection, site not specified; Z87.09-Personal  history of other diseases of the respiratory system; Z99.81-Dependence  on supplemental oxygen     COMPARISON: December 22, 2020.     FINDINGS: A left subclavian pacemaker is in place. A nerve stimulator  generator pack is seen in the right hemithorax. The heart is enlarged.  The mediastinum is unremarkable. There is pulmonary venous hypertension  and small effusions. There is no pneumothorax.  There are no acute  osseous abnormalities.       Impression:      Cardiomegaly with pulmonary venous hypertension and small  effusions.     Continued followup is recommended.     This report was finalized on 3/18/2021 8:34 AM by Richelle Patel M.D..           EGD dated 10/20/2020 no endoscopic esophageal abnormality to explain patient's dysphagia except subtle age-related concentric rings and tertiary contractions.  Esophagus dilated.  Erythematous mucosa in the antrum.  Patient likely has age-related presbyesophagus, no convincing signs of achalasia.  Gastric antrum biopsy with mild reactive gastropathy.  No H. pylori.  Esophagus  biopsies distal mid and proximal with squamous mucosa with mild nonspecific chronic inflammation.  Negative for dysplasia, carcinoma or increased intraepithelial eosinophils.    Assessment / Plan      Assessment/Recommendations:   3/18/2021  1. Esophageal dysphagia  Patient has a long history of dysphagia complaints.  His last EGD with dilatation was completed in 10/2020.  He had temporary relief after this dilatation was completed as he has with previous dilatations.  He has not had any notable weight loss.  Due to current respiratory status and risk of sedation with EGD, we will not recommend endoscopy to be completed at this time.    Patient should proceed with planned esophageal manometry testing with Dr. Tinsley as long as his respiratory status proves.     COPD can definitely increase his dysphagia due to difficulty breathing while eating. It is recommended that he follow instructions for dysphagia with a respiratory problem:  Do not use spouts or straws unless they have been specifically recommended.  Eat slowly and take smaller mouthfuls. It may help to take softer foods which require less chewing.  If your mouth is feeling dry, take a sip of fluid when you need to but be aware that alternating drinks with mouthfuls of food may be difficult.  You may benefit from doing an extra swallow to clear any food residue.  Avoid continuous drinking; ensure you take one sip at a time.  Attempt to breathe out immediately after a swallow.  You may find eating little and often easier. Select high calorie options if you are struggling to eat enough.  Remain upright for at least 30 minutes after a meal.     2. GERD  Acid reflux is currently controlled with Protonix 40 mg once daily, will continue.    3. Chronic idiopathic constipation  He has history of constipation and takes MiraLAX only as needed at home.  He has not had a bowel movement the past 5 to 6 days.    Recommend 2 doses of MiraLAX today, patient  agrees.            Thank you very much for letting me participate in the care of this patient.  Please do not hesitate to call me if you have any questions.    America Lane PA-C  [facility] [place]  3/18/2021  11:27 EDT    Please note that portions of this note may have been completed with a voice recognition program.

## 2021-03-18 NOTE — PLAN OF CARE
Goal Outcome Evaluation:        Patient compliant with bipap this shift. O2 @ 5L NC. Patient had increased difficulty swallowing this morning r/t esophageal dysmotility. Gastroenterology consult placed with Dr. Madrid. Possible discharge tomorrow.

## 2021-03-18 NOTE — PLAN OF CARE
Goal Outcome Evaluation:  Plan of Care Reviewed With: patient  Progress: no change  Outcome Summary: Pt received supine in bed, notably soa at rest.  Pt agreeable to sitting up eob and getting to his chair.  Pt supervision bed mobility and cga to stand and transfer from bed to chair.  Pt left sitting up in his chair with call light within reach.  Pt on 6L O2 via nc.

## 2021-03-18 NOTE — THERAPY TREATMENT NOTE
Patient Name: Jani Pena  : 1950    MRN: [id number]                              Today's Date: 3/18/2021       Admit Date: 3/15/2021    Visit Dx:     ICD-10-CM ICD-9-CM   1. Acute respiratory failure, unspecified whether with hypoxia or hypercapnia (CMS/HCC)  J96.00 518.81   2. Chronic renal impairment, unspecified CKD stage  N18.9 585.9   3. Urinary tract infection without hematuria, site unspecified  N39.0 599.0   4. History of COPD  Z87.09 V12.69   5. Oxygen dependent  Z99.81 V46.2     Patient Active Problem List   Diagnosis   • Anxiety   • Atrial fibrillation (CMS/HCC)   • Chronic kidney disease   • Steroid-dependent COPD (CMS/HCC)   • Degeneration of cervical intervertebral disc   • Gastroesophageal reflux disease   • Diverticulosis   • Hemorrhoids   • Hiatal hernia   • Hyperlipidemia   • Kyphosis, acquired   • PEDRO on CPAP   • Benign prostatic hyperplasia with incomplete bladder emptying   • Depression   • History of ventricular septal defect   • Chronic diastolic congestive heart failure (CMS/HCC)   • Coronary artery disease involving native coronary artery of native heart with angina pectoris (CMS/HCC)   • Lumbar radiculopathy   • Anemia   • Cardiac pacemaker in situ   • Sick sinus syndrome (CMS/HCC)   • Acute renal failure (ARF) (CMS/HCC)   • Chronic respiratory failure with hypoxia and hypercapnia (CMS/HCC)   • Hematoma   • Flexural eczema   • Xerosis of skin   • Abnormal urinalysis   • Esophageal dysphagia   • Fall   • Presbyesophagus   • Sepsis secondary to UTI (CMS/HCC)   • Acute respiratory failure (CMS/HCC)   • Acute on chronic respiratory failure with hypercapnia (CMS/HCC)   • Overweight (BMI 25.0-29.9)     Past Medical History:   Diagnosis Date   • Abnormal liver enzymes    • Anemia    • Anxiety    • Arthritis    • Atherosclerotic heart disease of native coronary artery without angina pectoris    • Atrial fibrillation (CMS/HCC)    • Back pain    • BPH (benign prostatic hyperplasia)     • Cataract, bilateral    • Chest pain     2-3 MONTHS AGO.  PER PT, HAS ADDRESSED WITH CARDIOLOGIST.  STATES HAS NTG PRN.   • CHF (congestive heart failure) (CMS/HCC)    • Chronic bronchitis (CMS/HCC)    • Chronic kidney disease    • COPD (chronic obstructive pulmonary disease) (CMS/HCC)    • Degeneration of cervical intervertebral disc    • Depression    • Diverticulosis    • Dyspnea    • Esophageal reflux    • Esophagitis    • Gastritis    • Hematuria    • Hemorrhoids    • Hiatal hernia    • History of arthritis    • History of nuclear stress test     UNSURE OF DATE   • History of peripheral neuropathy    • History of ventricular septal defect    • History of ventricular septal defect    • Hyperlipidemia    • Hypertension    • Impaired functional mobility, balance, gait, and endurance    • Infectious diarrhea    • Kyphosis, acquired    • Lumbar radiculopathy    • Mitral and aortic valve disease    • Nephrolithiasis    • Phimosis    • Problems with swallowing     WITH FOOD   • Respiratory failure (CMS/HCC)    • Sleep apnea     BIPAP, too awkward to bring   • TIA (transient ischemic attack)     X3.   2014   • Ventral hernia    • Wears glasses     FOR READING     Past Surgical History:   Procedure Laterality Date   • CARDIAC CATHETERIZATION     • CARDIAC CATHETERIZATION N/A 5/24/2018    Procedure: Left Heart Cath;  Surgeon: Edouard Robertson MD;  Location:  [facility];  Service: Cardiovascular   • CARDIAC ELECTROPHYSIOLOGY PROCEDURE N/A 1/31/2019    Procedure: PACEMAKER IMPLANTATION- DC;  Surgeon: Omar Encinas DO;  Location:  [facility];  Service: Cardiology   • COLONOSCOPY N/A 12/28/2019    Procedure: COLONOSCOPY WITH HOT POLYPECTOMY;  Surgeon: Celio Maldonado MD;  [facility]:  [facility];  Service: Gastroenterology   • ENDOSCOPY N/A 1/26/2019    Procedure: ESOPHAGOGASTRODUODENOSCOPY;  Surgeon: Brunner, Mark I, MD;  Location:  [facility];  Service: Gastroenterology   •  ENDOSCOPY N/A 12/27/2019    Procedure: ESOPHAGOGASTRODUODENOSCOPY;  Surgeon: Valdemar Mcdonald MD;  Location: [facility] ENDOSCOPY;  Service: General   • ENDOSCOPY N/A 10/20/2020    Procedure: ESOPHAGOGASTRODUODENOSCOPY WITH DILATION AND COLD FORCEP BIOPSY;  Surgeon: Cale Madrid MD;  Location: [facility] ENDOSCOPY;  Service: Gastroenterology;  Laterality: N/A;   • HERNIA REPAIR     • ORTHOPEDIC SURGERY Left     Left hip arthroplasty   • PACEMAKER IMPLANTATION  01/31/2019   • SUPRAPUBIC CATHETER INSERTION      History of Percutaneous Catheter Placement Into Ureter   • THROAT SURGERY      FOR SLEEP APNEA   • VSD REPAIR      History of VSD Repair By Patch Closure     General Information     Row Name 03/18/21 1217          OT Time and Intention    Document Type  therapy note (daily note)  -     Mode of Treatment  occupational therapy  -       User Key  (r) = Recorded By, (t) = Taken By, (c) = Cosigned By    Initials Name Provider Type    Akilah Del Angel Occupational Therapist          Mobility/ADL's     Row Name 03/18/21 1217          Bed Mobility    Supine-Sit Kevin (Bed Mobility)  supervision  -     Assistive Device (Bed Mobility)  head of bed elevated  -     Comment (Bed Mobility)  pt sat eob ~ 20 min talking with doctor and OT  -     Row Name 03/18/21 1217          Transfers    Transfers  bed-chair transfer  -     Bed-Chair Kevin (Transfers)  contact guard  -     Assistive Device (Bed-Chair Transfers)  other (see comments) pt held onto chair for support, did not want to use walker for transfer  -     Sit-Stand Kevin (Transfers)  supervision  -     Row Name 03/18/21 1217          Lower Body Dressing Assessment/Training    Kevin Level (Lower Body Dressing)  don;socks;set up  -     Position (Lower Body Dressing)  edge of bed sitting  -       User Key  (r) = Recorded By, (t) = Taken By, (c) = Cosigned By    Initials Name Provider Type    Akilah Del Angel  Occupational Therapist        Obj/Interventions    No documentation.       Goals/Plan    No documentation.       Clinical Impression     Row Name 03/18/21 1219          Pain Scale: Numbers Pre/Post-Treatment    Pretreatment Pain Rating  0/10 - no pain  -     Posttreatment Pain Rating  0/10 - no pain  -     Pre/Posttreatment Pain Comment  c/o soa  -     Row Name 03/18/21 1219          Plan of Care Review    Plan of Care Reviewed With  patient  -     Progress  no change  -     Outcome Summary  Pt received supine in bed, notably soa at rest.  Pt agreeable to sitting up eob and getting to his chair.  Pt supervision bed mobility and cga to stand and transfer from bed to chair.  Pt left sitting up in his chair with call light within reach.  Pt on 6L O2 via nc.  -       User Key  (r) = Recorded By, (t) = Taken By, (c) = Cosigned By    Initials Name Provider Type    Akilah Del Angel Occupational Therapist        Outcome Measures     Row Name 03/18/21 1230          How much help from another is currently needed...    Putting on and taking off regular lower body clothing?  3  -AH     Bathing (including washing, rinsing, and drying)  3  -AH     Toileting (which includes using toilet bed pan or urinal)  3  -AH     Putting on and taking off regular upper body clothing  4  -AH     Taking care of personal grooming (such as brushing teeth)  4  -AH     Eating meals  4  -AH     AM-PAC 6 Clicks Score (OT)  21  -       User Key  (r) = Recorded By, (t) = Taken By, (c) = Cosigned By    Initials Name Provider Type    Akilah Del Angel Occupational Therapist        Occupational Therapy Education                 Title: PT OT SLP Therapies (In Progress)     Topic: Occupational Therapy (In Progress)     Point: ADL training (Done)     Description:   Instruct learner(s) on proper safety adaptation and remediation techniques during self care or transfers.   Instruct in proper use of assistive devices.              Learning  Progress Summary           Patient Acceptance, E,TB, VU by  at 3/18/2021 1231    Comment: benefit of getting oob to chair    Acceptance, E,TB, VU by  at 3/17/2021 1334    Comment: Role of OT/POC                   Point: Home exercise program (Not Started)     Description:   Instruct learner(s) on appropriate technique for monitoring, assisting and/or progressing therapeutic exercises/activities.              Learner Progress:  Not documented in this visit.          Point: Precautions (Not Started)     Description:   Instruct learner(s) on prescribed precautions during self-care and functional transfers.              Learner Progress:  Not documented in this visit.          Point: Body mechanics (Not Started)     Description:   Instruct learner(s) on proper positioning and spine alignment during self-care, functional mobility activities and/or exercises.              Learner Progress:  Not documented in this visit.                      User Key     Initials Effective Dates Name Provider Type Discipline     03/07/18 -  Akilah Gutierrez Occupational Therapist OT              OT Recommendation and Plan  Planned Therapy Interventions (OT): activity tolerance training, BADL retraining, strengthening exercise, transfer/mobility retraining  Therapy Frequency (OT): 3 times/wk  Plan of Care Review  Plan of Care Reviewed With: patient  Progress: no change  Outcome Summary: Pt received supine in bed, notably soa at rest.  Pt agreeable to sitting up eob and getting to his chair.  Pt supervision bed mobility and cga to stand and transfer from bed to chair.  Pt left sitting up in his chair with call light within reach.  Pt on 6L O2 via nc.     Time Calculation:   Time Calculation- OT     Row Name 03/18/21 1231             Time Calculation- OT    OT Start Time  1121  -      OT Stop Time  1150  -      OT Time Calculation (min)  29 min  -      OT Received On  03/18/21  -      OT Goal Re-Cert Due Date  03/27/21  -          Timed Charges    81295 - OT Therapeutic Activity Minutes  29  -        User Key  (r) = Recorded By, (t) = Taken By, (c) = Cosigned By    Initials Name Provider Type    Akilah Del Angel Occupational Therapist        Therapy Charges for Today     Code Description Service Date Service Provider Modifiers Qty    [id number]  OT EVAL LOW COMPLEXITY 3 3/17/2021 Akilah Gutierrez 1    [id number]  OT THERAPEUTIC ACT EA 15 MIN 3/18/2021 Akilah Gutierrez 2               Akilah Gutierrez  3/18/2021

## 2021-03-18 NOTE — PROGRESS NOTES
"  CC: Acute respiratory failure.  Hemoptysis.    S: Patient states he feels better, but not back to baseline.  Still with moderate cough with thick secretions, but no longer has any blood.  Mild chills at night, but otherwise feels fine.  Appetite slowly improving    ROS: Positive for moderate, productive cough, shortness of breath. Denies chest pain, diarrhea or fever.    O: /68 (Patient Position: Lying)   Pulse 76   Temp 97.8 °F (36.6 °C) (Oral)   Resp 18   Ht 172.7 cm (68\")   Wt 83.6 kg (184 lb 4.9 oz)   SpO2 98%   BMI 28.02 kg/m²     Vital signs reviewed.  General/Constitutional: Mild respiratory distress noted when trying to speak in complete sentences, appears stated age  HEENT: Pupils reactive bilaterally, extract muscles are intact, no eye discharge  Neck: No obvious JVD   Cardiovascular: S1 + S2.  Pacemaker noted.  Regular rate  Lungs/Respiratory: Decreased breath sounds throughout with scattered wheezing  Abdomen: Soft, no obvious tenderness to palpation  Musculoskeletal/Extremities: No significant edema noted.  Mild deconditioning appreciated  Neurologic:  AAOx3.  Moving all 4 extremities spontaneously    Labs: Reviewed.     Results from last 7 days   Lab Units 03/18/21  0842 03/17/21  0523 03/16/21  0556 03/15/21  0833   WBC 10*3/mm3 13.32* 10.99* 6.68 7.36   HEMOGLOBIN g/dL 12.3* 11.1* 11.1* 12.9*   HEMATOCRIT % 40.4 36.0* 35.1* 41.7   PLATELETS 10*3/mm3 205 188 175 202       Results from last 7 days   Lab Units 03/18/21  0842 03/17/21  0523 03/16/21  0556 03/15/21  0833   SODIUM mmol/L 137 141 137 143   POTASSIUM mmol/L 5.0 5.1 4.6 4.3   CHLORIDE mmol/L 96* 99 94* 95*   CO2 mmol/L 30.3* 35.8* 33.9* 38.2*   BUN mg/dL 47* 49* 44* 40*   CREATININE mg/dL 2.00* 2.24* 2.07* 2.09*   CALCIUM mg/dL 8.6 8.8 8.5* 9.4   BILIRUBIN mg/dL  --   --   --  0.8   ALK PHOS U/L  --   --   --  62   ALT (SGPT) U/L  --   --   --  10   AST (SGOT) U/L  --   --   --  18   GLUCOSE mg/dL 131* 129* 135* 133*   TOTAL " PROTEIN g/dL  --   --   --  6.8   ALBUMIN g/dL 4.40 4.00  --  4.60       Results from last 7 days   Lab Units 03/18/21  0842 03/17/21  0523 03/15/21  0833   MAGNESIUM mg/dL 2.9* 2.8* 2.4   PHOSPHORUS mg/dL 4.1 3.6  --              Lab Results   Component Value Date    PROCALCITO 0.08 03/15/2021    PROCALCITO 0.11 12/22/2020    PROCALCITO 0.18 12/13/2019       Lab Results   Component Value Date    PROBNP 2,693.0 (H) 03/15/2021    PROBNP 4,396 (H) 12/29/2020    PROBNP 4,258.0 (H) 12/22/2020       Lab Results   Component Value Date    CRP <0.50 12/31/2017       Lab Results   Component Value Date    SEDRATE 5 08/14/2018    SEDRATE 4 12/31/2017         Micro: As of March 18, 2021   Lab Results   Component Value Date    RESPCX Rejected 12/16/2019    RESPCX Light growth (2+) Candida albicans (A) 09/16/2017    RESPCX Stenotrophomonas maltophilia (A) 09/16/2017     Lab Results   Component Value Date    BLOODCX No growth at 3 days 03/15/2021    BLOODCX No growth at 3 days 03/15/2021    BLOODCX No growth at 5 days 12/13/2020     Lab Results   Component Value Date    URINECX 50,000 CFU/mL Mixed Natividad Isolated 03/15/2021    URINECX Final report (A) 12/29/2020    URINECX >100,000 CFU/mL Klebsiella oxytoca (A) 12/13/2020     Lab Results   Component Value Date    FLU Negative 03/18/2017    FLU Negative 03/18/2017     COVID 19:  Lab Results   Component Value Date    COVID19 Not Detected 03/15/2021            ABG: Reviewed  Lab Results   Component Value Date    PHART 7.356 03/17/2021    LML7AML 63.7 (C) 03/17/2021    PO2ART 85.0 03/17/2021    HGBBG 10.9 (L) 12/26/2019    D3FPVIEX 97.0 03/17/2021    FCOHB 4.5 08/10/2016    CARBOXYHGB 1.0 03/17/2021    FMETHB 0.3 08/10/2016         CXRay: Latest imaging study was reviewed personally.   Imaging Results (Last 24 Hours)     Procedure Component Value Units Date/Time    XR Chest 1 View [[id number]] Collected: 03/18/21 0832     Updated: 03/18/21 0836    Narrative:      PROCEDURE: XR CHEST 1  VW-     HISTORY: dyspnea; J96.00-Acute respiratory failure, unspecified whether  with hypoxia or hypercapnia; N18.9-Chronic kidney disease, unspecified;  N39.0-Urinary tract infection, site not specified; Z87.09-Personal  history of other diseases of the respiratory system; Z99.81-Dependence  on supplemental oxygen     COMPARISON: December 22, 2020.     FINDINGS: A left subclavian pacemaker is in place. A nerve stimulator  generator pack is seen in the right hemithorax. The heart is enlarged.  The mediastinum is unremarkable. There is pulmonary venous hypertension  and small effusions. There is no pneumothorax.  There are no acute  osseous abnormalities.       Impression:      Cardiomegaly with pulmonary venous hypertension and small  effusions.     Continued followup is recommended.     This report was finalized on 3/18/2021 8:34 AM by Richelle Patel M.D..          Assessment & Recommendations/Plan:   1.  Acute respiratory failure  Likely due to COPD exacerbation.  Continues to improve clinically and back to his baseline oxygen requirements of 5 L    2.  Acute exacerbation of COPD  Continue nebulized treatments.   Continue on Pulmicort, Brovana and DuoNebs  Added as needed albuterol for breakthrough symptoms  Transition to oral steroids    3.  Chronic respiratory acidosis  Care management was able to obtain noninvasive ventilation settings at home.  Appears patient is on BiPAP at 20/10 centimeters of water with 5 L oxygen bleed in.  Will adjust BiPAP settings here.    4.  Obstructive sleep apnea  Last sleep study from 2017, home study, showed moderate obstructive sleep apnea with an AHI of 23/h.    5.  Chronic hypoxemia  Continue oxygen at the current rate.      We have reviewed patient's current orders and changes, if any, have been suggested to primary care team. Plan was also discussed with nursing staff, as necessary.     This document was electronically signed by Chika Rinaldi MD on 03/18/21 at  10:51 EDT      Dictated utilizing Dragon dictation.

## 2021-03-18 NOTE — PROGRESS NOTES
[facility]    Name:  Jani Pena   Age:  70 y.o.  Sex:  male  :  1950  MRN:  [id number]   Visit Number:  [id number]  Admission Date:  3/15/2021  Date Of Service:  21  Primary Care Physician:  Miguel Rojas MD     LOS: 1 day :  Patient Care Team:  Miguel Rojas MD as PCP - General (Internal Medicine)  Ulysses Bass MD as Cardiologist (Cardiology):    History taken from:     patient chart    Chief Complaint:      Dyspnea    Subjective:    Interval History:     Patient seen and examined again today.    Patient 70-year-old male with paroxysmal A. fib lesion, chronic kidney disease stage IIIb, GERD, chronic hypoxic respiratory failure on home O2, chronic hypercapnic respiratory failure, sick sinus syndrome with pacemaker comes with shortness of breath and hemoptysis.  He has been fatigued and weak.  He continues to have shortness of breath.  He denies chest pressure, or pain.    He has COPD and is followed by pulmonology as outpatient.  He has no history of atrial fibrillation but is not on anticoagulation but is on aspirin Plavix.  Pulmonology is following.  Patient continues on Solu-Medrol, Brovana and Pulmicort have been added.  Change to oral steroids today.  He is on his baseline oxygen at 5 L at present.  Chest x-ray was done and reviewed, shows pulmonary venous hypertension and small effusions.    He had some nausea and vomiting this morning so GI was consulted.  He has esophageal motility issues was referred to manometry at [facility] in [place], this will need to be rescheduled as an outpatient.  They recommend no EGD as the patient has significant respiratory issues at present.  They made recommendations for his diet.    CT scan of the chest showed no evidence of PE or dissection or pneumonia.  Patient does feel better.  He continues to wear BiPAP which has been adjusted by pulmonology.  His blood gases have improved he is no longer  acidotic.     Patient has coronary artery disease, his troponin was minimally elevated and trended down.  Suspect this is supply demand mismatch.    Review of Systems:     All systems were reviewed and negative except for:  Respiratory: positive for  shortness of air    Objective:    Vital Signs:    Temp:  [96.6 °F (35.9 °C)-99 °F (37.2 °C)] 97.9 °F (36.6 °C)  Heart Rate:  [] 100  Resp:  [16-22] 22  BP: (118-156)/(68-94) 156/93    Physical Exam:    General: Alert and orient x4, mild distress  Heart: Paced rhythm.  No murmur rub or thrill noted.  Lungs: Decreased present bilaterally without use of accessory muscles respiration.  Abdomen: Soft/nontender/nondistended.  No HSM noted.  MSK: 4/5 strength in upper/lower extremity bilaterally.     Results Review:      I reviewed the patient's new clinical results.    Labs:    Results from last 7 days   Lab Units 03/18/21  0842 03/17/21 0523 03/16/21  0556 03/15/21  0833   SODIUM mmol/L 137 141 137 143   POTASSIUM mmol/L 5.0 5.1 4.6 4.3   CHLORIDE mmol/L 96* 99 94* 95*   CO2 mmol/L 30.3* 35.8* 33.9* 38.2*   BUN mg/dL 47* 49* 44* 40*   CREATININE mg/dL 2.00* 2.24* 2.07* 2.09*   CALCIUM mg/dL 8.6 8.8 8.5* 9.4   BILIRUBIN mg/dL  --   --   --  0.8   ALK PHOS U/L  --   --   --  62   ALT (SGPT) U/L  --   --   --  10   AST (SGOT) U/L  --   --   --  18   GLUCOSE mg/dL 131* 129* 135* 133*     Results from last 7 days   Lab Units 03/18/21  0842 03/17/21  0523 03/16/21  0556   WBC 10*3/mm3 13.32* 10.99* 6.68   HEMOGLOBIN g/dL 12.3* 11.1* 11.1*   HEMATOCRIT % 40.4 36.0* 35.1*   PLATELETS 10*3/mm3 205 188 175         Results from last 7 days   Lab Units 03/15/21  2239 03/15/21  1650 03/15/21  0833   CK TOTAL U/L  --   --  77   TROPONIN T ng/mL 0.037* 0.040* 0.065*     Results from last 7 days   Lab Units 03/15/21  1007 03/15/21  0924 03/15/21  0923   BLOODCX   --  No growth at 3 days No growth at 3 days   URINECX  50,000 CFU/mL Mixed [place]ivid[place] Isolated  --   --      Results from last  7 days   Lab Units 03/17/21  0739   PH, ARTERIAL pH units 7.356   PO2 ART mm Hg 85.0   PCO2, ARTERIAL mm Hg 63.7*   HCO3 ART mmol/L 35.6*        Radiology:    Imaging Results (Last 24 Hours)     Procedure Component Value Units Date/Time    XR Chest 1 View [[id number]] Collected: 03/18/21 0832     Updated: 03/18/21 0836    Narrative:      PROCEDURE: XR CHEST 1 VW-     HISTORY: dyspnea; J96.00-Acute respiratory failure, unspecified whether  with hypoxia or hypercapnia; N18.9-Chronic kidney disease, unspecified;  N39.0-Urinary tract infection, site not specified; Z87.09-Personal  history of other diseases of the respiratory system; Z99.81-Dependence  on supplemental oxygen     COMPARISON: December 22, 2020.     FINDINGS: A left subclavian pacemaker is in place. A nerve stimulator  generator pack is seen in the right hemithorax. The heart is enlarged.  The mediastinum is unremarkable. There is pulmonary venous hypertension  and small effusions. There is no pneumothorax.  There are no acute  osseous abnormalities.       Impression:      Cardiomegaly with pulmonary venous hypertension and small  effusions.     Continued followup is recommended.     This report was finalized on 3/18/2021 8:34 AM by Richelle Patel M.D..          Medication Review:     amLODIPine, 5 mg, Oral, Q24H  arformoterol, 15 mcg, Nebulization, BID - RT  aspirin, 81 mg, Oral, Daily  atorvastatin, 10 mg, Oral, Q PM  budesonide, 0.5 mg, Nebulization, BID - RT  clopidogrel, 75 mg, Oral, Daily  escitalopram, 10 mg, Oral, Daily  famotidine, 40 mg, Oral, Daily  ferrous sulfate, 324 mg, Oral, Daily With Breakfast  finasteride, 5 mg, Oral, Daily  ipratropium-albuterol, 3 mL, Nebulization, Q6H - RT  lidocaine, 1 patch, Transdermal, Q24H  melatonin, 5 mg, Oral, Nightly  mirtazapine, 15 mg, Oral, Nightly  oxybutynin, 5 mg, Oral, Nightly  pantoprazole, 40 mg, Oral, QAM AC  polyethylene glycol, 17 g, Oral, BID  potassium chloride, 10 mEq, Oral,  Daily  predniSONE, 40 mg, Oral, Daily With Breakfast  sodium chloride, 3 mL, Intravenous, Q12H  tamsulosin, 0.4 mg, Oral, Nightly  traZODone, 50 mg, Oral, Q PM             Assessment:    Problem List Items Addressed This Visit        Other    Acute respiratory failure (CMS/[facility]) - Primary      Other Visit Diagnoses     Chronic renal impairment, unspecified CKD stage        Urinary tract infection without hematuria, site unspecified        History of COPD        Oxygen dependent              1.  Acute on chronic hypoxic and hypercapnic respiratory failure.  2.  COPD with exacerbation  3.  Possible UTI, culture contaminated.  4.  Elevation in troponin, type II MI, supply demand mismatch  5.  Sick sinus syndrome pacemaker in place  6.  Chronic kidney disease stage IIIb, baseline creatinine appears to be around 1.7.  7.  Atrial fibrillation, currently rate controlled.  Not on anticoagulation.  8.  Suspected esophageal dysmotility, referred for esophageal manometry by Dr. Tinsley at [facility].  Previous dilatations of the esophagus have been done, last in October.  9.  Coronary artery disease on medical management  10.  Probable acute bronchitis  11.  Fatty infiltration of the liver  12.  Hemoptysis      Plan:    Continue Rocephin and Zithromax.  Pulmicort and Brovana have been added by pulmonology .  Change to prednisone, will taper over a couple of weeks.  Consulted gastroenterology for recommendations, they recommended conservative management at present given his respiratory issues.  PT, OT, speech therapy to follow.  Continue with BiPAP at night.  Check lab work in a.m. Patient appears to be improving, hopefully patient can return to [facility] soon.  Further recommendations will pend clinical course.    Joshua Mendez,   03/18/21  17:00 EDT

## 2021-03-19 NOTE — PROGRESS NOTES
"  CC: Acute respiratory failure.  Hemoptysis.    S: Feels better, compared to 3 days ago.  Continues to have mild cough.  Denies any hemoptysis.  Denies any significant fever.      ROS: Positive for mild cough, shortness of breath. Denies chest pain, diarrhea or fever.    O: /78 (BP Location: Left arm, Patient Position: Lying)   Pulse 117   Temp 97.8 °F (36.6 °C) (Oral)   Resp 18   Ht 172.7 cm (68\")   Wt 83 kg (182 lb 15.7 oz)   SpO2 93%   BMI 27.82 kg/m²     Vital signs reviewed.  General/Constitutional: Mild respiratory distress noted.  Neck: No obvious JVD   Cardiovascular: S1 + S2.  Pacemaker noted.  Lungs/Respiratory: Mild respiratory distress noted.  Bilateral, mostly expiratory, wheezing heard.  Minimal basal crackles noted  Musculoskeletal/Extremities: No significant edema noted.  Neurologic:  AAOx3. Was able to follow commands     Labs: Reviewed.     Results from last 7 days   Lab Units 03/19/21  0513 03/18/21  0842 03/17/21  0523 03/16/21  0556 03/15/21  0833   WBC 10*3/mm3 9.90 13.32* 10.99* 6.68 7.36   HEMOGLOBIN g/dL 10.9* 12.3* 11.1* 11.1* 12.9*   HEMATOCRIT % 36.0* 40.4 36.0* 35.1* 41.7   PLATELETS 10*3/mm3 180 205 188 175 202       Results from last 7 days   Lab Units 03/19/21  0513 03/18/21  0842 03/17/21  0523 03/15/21  0833   SODIUM mmol/L 140 137 141 143   POTASSIUM mmol/L 5.2 5.0 5.1 4.3   CHLORIDE mmol/L 100 96* 99 95*   CO2 mmol/L 34.1* 30.3* 35.8* 38.2*   BUN mg/dL 46* 47* 49* 40*   CREATININE mg/dL 1.86* 2.00* 2.24* 2.09*   CALCIUM mg/dL 8.8 8.6 8.8 9.4   BILIRUBIN mg/dL  --   --   --  0.8   ALK PHOS U/L  --   --   --  62   ALT (SGPT) U/L  --   --   --  10   AST (SGOT) U/L  --   --   --  18   GLUCOSE mg/dL 96 131* 129* 133*   TOTAL PROTEIN g/dL  --   --   --  6.8   ALBUMIN g/dL 3.80 4.40 4.00 4.60       Results from last 7 days   Lab Units 03/19/21  0513 03/18/21  0842 03/17/21  0523   MAGNESIUM mg/dL 3.0* 2.9* 2.8*   PHOSPHORUS mg/dL 2.8 4.1 3.6             Lab Results "   Component Value Date    PROCALCITO 0.08 03/15/2021    PROCALCITO 0.11 12/22/2020    PROCALCITO 0.18 12/13/2019       Lab Results   Component Value Date    PROBNP 2,693.0 (H) 03/15/2021    PROBNP 4,396 (H) 12/29/2020    PROBNP 4,258.0 (H) 12/22/2020       Lab Results   Component Value Date    CRP <0.50 12/31/2017       Lab Results   Component Value Date    SEDRATE 5 08/14/2018    SEDRATE 4 12/31/2017         Micro: As of March 19, 2021   Lab Results   Component Value Date    RESPCX Rejected 12/16/2019    RESPCX Light growth (2+) Candida albicans (A) 09/16/2017    RESPCX Stenotrophomonas maltophilia (A) 09/16/2017     Lab Results   Component Value Date    BLOODCX No growth at 4 days 03/15/2021    BLOODCX No growth at 4 days 03/15/2021    BLOODCX No growth at 5 days 12/13/2020     Lab Results   Component Value Date    URINECX 50,000 CFU/mL Mixed [place] Isolated 03/15/2021    URINECX Final report (A) 12/29/2020    URINECX >100,000 CFU/mL Klebsiella oxytoca (A) 12/13/2020     Lab Results   Component Value Date    FLU Negative 03/18/2017    FLU Negative 03/18/2017     COVID 19:  Lab Results   Component Value Date    COVID19 Not Detected 03/15/2021            ABG: Reviewed  Lab Results   Component Value Date    PHART 7.356 03/17/2021    QRN4OMO 63.7 (C) 03/17/2021    PO2ART 85.0 03/17/2021    HGBBG 10.9 (L) 12/26/2019    L2EJNRMU 97.0 03/17/2021    FCOHB 4.5 08/10/2016    CARBOXYHGB 1.0 03/17/2021    FMETHB 0.3 08/10/2016         CXRay: Latest imaging study was reviewed personally.   Imaging Results (Last 24 Hours)     ** No results found for the last 24 hours. **          Assessment & Recommendations/Plan:   1.  Acute respiratory failure  Likely due to COPD exacerbation.    2.  Acute exacerbation of COPD  Continue nebulized treatments.   Continue steroids.    3.  Chronic respiratory acidosis  He actually is on BiPAP at home.  Continue BiPAP at a pressure of 10/20.    4.  Obstructive sleep apnea  Last sleep study from  2017, home study, showed moderate obstructive sleep apnea with an AHI of 23/h.    5.  Chronic hypoxemia  Continue oxygen at the current rate.  Was on 5 L/min on 3/16/2021.  4 L/min, appears to be his baseline.  He does mention using up to 6 L with exertion at home.    6.  Deconditioning  Continue physical therapy.  Seems to be slowly improving with physical therapy.    We have reviewed patient's current orders and changes, if any, have been suggested to primary care team. Plan was also discussed with nursing staff, as necessary.     We have updated the admitting attending and nursing staff, as appropriate, on the patient's current status and plan. I will be going off shift tonight and will be unavailable. Please contact oncall pulmonologist, if available.      This document was electronically signed by Slim Toribio MD on 03/19/21 at 11:24 EDT      Dictated utilizing Dragon dictation.

## 2021-03-19 NOTE — THERAPY TREATMENT NOTE
Patient Name: Jani Pena  : 1950    MRN: [id number]                              Today's Date: 3/19/2021       Admit Date: 3/15/2021    Visit Dx:     ICD-10-CM ICD-9-CM   1. Acute respiratory failure, unspecified whether with hypoxia or hypercapnia (CMS/HCC)  J96.00 518.81   2. Chronic renal impairment, unspecified CKD stage  N18.9 585.9   3. Urinary tract infection without hematuria, site unspecified  N39.0 599.0   4. History of COPD  Z87.09 V12.69   5. Oxygen dependent  Z99.81 V46.2     Patient Active Problem List   Diagnosis   • Anxiety   • Atrial fibrillation (CMS/HCC)   • Chronic kidney disease   • Steroid-dependent COPD (CMS/HCC)   • Degeneration of cervical intervertebral disc   • Gastroesophageal reflux disease   • Diverticulosis   • Hemorrhoids   • Hiatal hernia   • Hyperlipidemia   • Kyphosis, acquired   • PEDRO on CPAP   • Benign prostatic hyperplasia with incomplete bladder emptying   • Depression   • History of ventricular septal defect   • Chronic diastolic congestive heart failure (CMS/HCC)   • Coronary artery disease involving native coronary artery of native heart with angina pectoris (CMS/HCC)   • Lumbar radiculopathy   • Anemia   • Cardiac pacemaker in situ   • Sick sinus syndrome (CMS/HCC)   • Acute renal failure (ARF) (CMS/HCC)   • Chronic respiratory failure with hypoxia and hypercapnia (CMS/HCC)   • Hematoma   • Flexural eczema   • Xerosis of skin   • Abnormal urinalysis   • Esophageal dysphagia   • Fall   • Presbyesophagus   • Sepsis secondary to UTI (CMS/HCC)   • Acute respiratory failure (CMS/HCC)   • Acute on chronic respiratory failure with hypercapnia (CMS/HCC)   • Overweight (BMI 25.0-29.9)     Past Medical History:   Diagnosis Date   • Abnormal liver enzymes    • Anemia    • Anxiety    • Arthritis    • Atherosclerotic heart disease of native coronary artery without angina pectoris    • Atrial fibrillation (CMS/HCC)    • Back pain    • BPH (benign prostatic hyperplasia)     • Cataract, bilateral    • Chest pain     2-3 MONTHS AGO.  PER PT, HAS ADDRESSED WITH CARDIOLOGIST.  STATES HAS NTG PRN.   • CHF (congestive heart failure) (CMS/HCC)    • Chronic bronchitis (CMS/HCC)    • Chronic kidney disease    • COPD (chronic obstructive pulmonary disease) (CMS/HCC)    • Degeneration of cervical intervertebral disc    • Depression    • Diverticulosis    • Dyspnea    • Esophageal reflux    • Esophagitis    • Gastritis    • Hematuria    • Hemorrhoids    • Hiatal hernia    • History of arthritis    • History of nuclear stress test     UNSURE OF DATE   • History of peripheral neuropathy    • History of ventricular septal defect    • History of ventricular septal defect    • Hyperlipidemia    • Hypertension    • Impaired functional mobility, balance, gait, and endurance    • Infectious diarrhea    • Kyphosis, acquired    • Lumbar radiculopathy    • Mitral and aortic valve disease    • Nephrolithiasis    • Phimosis    • Problems with swallowing     WITH FOOD   • Respiratory failure (CMS/HCC)    • Sleep apnea     BIPAP, too awkward to bring   • TIA (transient ischemic attack)     X3.   2014   • Ventral hernia    • Wears glasses     FOR READING     Past Surgical History:   Procedure Laterality Date   • CARDIAC CATHETERIZATION     • CARDIAC CATHETERIZATION N/A 5/24/2018    Procedure: Left Heart Cath;  Surgeon: Edouard Robertson MD;  Location:  [facility];  Service: Cardiovascular   • CARDIAC ELECTROPHYSIOLOGY PROCEDURE N/A 1/31/2019    Procedure: PACEMAKER IMPLANTATION- DC;  Surgeon: Omar Encinas DO;  Location:  [facility];  Service: Cardiology   • COLONOSCOPY N/A 12/28/2019    Procedure: COLONOSCOPY WITH HOT POLYPECTOMY;  Surgeon: Celio Maldonado MD;  [facility]  [facility];  Service: Gastroenterology   • ENDOSCOPY N/A 1/26/2019    Procedure: ESOPHAGOGASTRODUODENOSCOPY;  Surgeon: Brunner, Mark I, MD;  Location:  [facility];  Service: Gastroenterology   •  ENDOSCOPY N/A 12/27/2019    Procedure: ESOPHAGOGASTRODUODENOSCOPY;  Surgeon: Valdemar Mcdonald MD;  Location: [facility] ENDOSCOPY;  Service: General   • ENDOSCOPY N/A 10/20/2020    Procedure: ESOPHAGOGASTRODUODENOSCOPY WITH DILATION AND COLD FORCEP BIOPSY;  Surgeon: Cale Madrid MD;  Location: [facility] ENDOSCOPY;  Service: Gastroenterology;  Laterality: N/A;   • HERNIA REPAIR     • ORTHOPEDIC SURGERY Left     Left hip arthroplasty   • PACEMAKER IMPLANTATION  01/31/2019   • SUPRAPUBIC CATHETER INSERTION      History of Percutaneous Catheter Placement Into Ureter   • THROAT SURGERY      FOR SLEEP APNEA   • VSD REPAIR      History of VSD Repair By Patch Closure     General Information     Row Name 03/19/21 1119          OT Time and Intention    Document Type  therapy note (daily note)  -     Mode of Treatment  occupational therapy  -       User Key  (r) = Recorded By, (t) = Taken By, (c) = Cosigned By    Initials Name Provider Type     Akilah Gutierrez Occupational Therapist          Mobility/ADL's     Row Name 03/19/21 1120          Bed Mobility    Bed Mobility  supine-sit  -     Supine-Sit [place] (Bed Mobility)  modified independence  -     Assistive Device (Bed Mobility)  head of bed elevated  -[facility] Name 03/19/21 1120          Transfers    Transfers  sit-stand transfer  -     Sit-Stand [place] (Transfers)  contact guard  -[facility] Name 03/19/21 1120          Sit-Stand Transfer    Assistive Device (Sit-Stand Transfers)  walker, front-wheeled  -[facility] Name 03/19/21 1120          Functional Mobility    Functional Mobility- Ind. Level  contact guard assist  -     Functional Mobility- Device  rolling walker  -     Functional Mobility-Distance (Feet)  128  -     Functional Mobility- Safety Issues  supplemental O2  -     Row Name 03/19/21 1120          Lower Body Dressing Assessment/Training    [place] Level (Lower Body Dressing)  don;pants/bottoms;set up  -      Position (Lower Body Dressing)  edge of bed sitting  -       User Key  (r) = Recorded By, (t) = Taken By, (c) = Cosigned By    Initials Name Provider Type     Akilah Gutierrez Occupational Therapist        Obj/Interventions     Row Name 03/19/21 1125          Shoulder (Therapeutic Exercise)    Shoulder (Therapeutic Exercise)  strengthening exercise  -     Shoulder Strengthening (Therapeutic Exercise)  bilateral;horizontal aBduction/aDduction;resistance band;red;10 repetitions  -[facility] Name 03/19/21 1125          Elbow/Forearm (Therapeutic Exercise)    Elbow/Forearm (Therapeutic Exercise)  strengthening exercise  -     Elbow/Forearm Strengthening (Therapeutic Exercise)  bilateral;flexion;extension;resistance band;red;10 repetitions  -[facility] Name 03/19/21 1125          Therapeutic Exercise    Therapeutic Exercise  shoulder;elbow/forearm  -       User Key  (r) = Recorded By, (t) = Taken By, (c) = Cosigned By    Initials Name Provider Type     Akilah Gutierrez Occupational Therapist        Goals/Plan    No documentation.       Clinical Impression     [facility] Name 03/19/21 1126          Pain Scale: Numbers Pre/Post-Treatment    Pretreatment Pain Rating  0/10 - no pain  -     Posttreatment Pain Rating  0/10 - no pain  -[facility] Name 03/19/21 1126          Plan of Care Review    Plan of Care Reviewed With  patient  -     Progress  improving  -     Outcome Summary  Pt feeling better today.  Pt on 5L O2 via nc, MI bed mobility and cga for LB dressing.  Pt walked 128' with cga using RW.  Pt completed ther ex per flow sheet using red theraband for resistance.  -[facility] Name 03/19/21 1126          Vital Signs    Pre SpO2 (%)  96  -     O2 Delivery Pre Treatment  supplemental O2 5L  -AH     Intra SpO2 (%)  88  -AH     O2 Delivery Intra Treatment  supplemental O2 5L  -AH     Post SpO2 (%)  96  -     O2 Delivery Post Treatment  supplemental O2 5L  -AH     Row Name 03/19/21 1126          Positioning and  Restraints    Pre-Treatment Position  in bed  -AH     Post Treatment Position  chair  -     In Chair  sitting;call light within reach;encouraged to call for assist  -       User Key  (r) = Recorded By, (t) = Taken By, (c) = Cosigned By    Initials Name Provider Type    Akilah Del Angel Occupational Therapist        Outcome Measures     Row Name 03/19/21 1146          How much help from another is currently needed...    Putting on and taking off regular lower body clothing?  4  -AH     Bathing (including washing, rinsing, and drying)  3  -AH     Toileting (which includes using toilet bed pan or urinal)  3  -AH     Putting on and taking off regular upper body clothing  4  -AH     Taking care of personal grooming (such as brushing teeth)  4  -AH     Eating meals  4  -     AM-PAC 6 Clicks Score (OT)  22  -AH     Row Name 03/19/21 1146          Functional Assessment    Outcome Measure Options  AM-PAC 6 Clicks Daily Activity (OT)  -       User Key  (r) = Recorded By, (t) = Taken By, (c) = Cosigned By    Initials Name Provider Type    Akilah Del Angel Occupational Therapist        Occupational Therapy Education                 Title: PT OT SLP Therapies (In Progress)     Topic: Occupational Therapy (In Progress)     Point: ADL training (Done)     Description:   Instruct learner(s) on proper safety adaptation and remediation techniques during self care or transfers.   Instruct in proper use of assistive devices.              Learning Progress Summary           Patient Acceptance, E,TB, VU by  at 3/19/2021 1147    Comment: benefit of therapy and UB ex to improve his strength/endurance.    Acceptance, E,TB, VU by  at 3/18/2021 1231    Comment: benefit of getting oob to chair    Acceptance, E,TB, VU by  at 3/17/2021 1334    Comment: Role of OT/POC                   Point: Home exercise program (Done)     Description:   Instruct learner(s) on appropriate technique for monitoring, assisting and/or progressing  therapeutic exercises/activities.              Learning Progress Summary           Patient Acceptance, E,TB, VU by  at 3/19/2021 1147    Comment: benefit of therapy and UB ex to improve his strength/endurance.                   Point: Precautions (Not Started)     Description:   Instruct learner(s) on prescribed precautions during self-care and functional transfers.              Learner Progress:  Not documented in this visit.          Point: Body mechanics (Not Started)     Description:   Instruct learner(s) on proper positioning and spine alignment during self-care, functional mobility activities and/or exercises.              Learner Progress:  Not documented in this visit.                      User Key     Initials Effective Dates Name Provider Type [facility] 03/07/18 -  Akilah Gutierrez Occupational Therapist OT              OT Recommendation and Plan  Planned Therapy Interventions (OT): activity tolerance training, BADL retraining, strengthening exercise, transfer/mobility retraining  Therapy Frequency (OT): 3 times/wk  Plan of Care Review  Plan of Care Reviewed With: patient  Progress: improving  Outcome Summary: Pt feeling better today.  Pt on 5L O2 via nc, MI bed mobility and cga for LB dressing.  Pt walked 128' with cga using RW.  Pt completed ther ex per flow sheet using red theraband for resistance.     Time Calculation:   Time Calculation- OT     Row Name 03/19/21 1147             Time Calculation-     OT Start Time  1025  -      OT Stop Time  1100  -      OT Time Calculation (min)  35 min  -      OT Received On  03/19/21  -      OT Goal Re-Cert Due Date  03/27/21  -         Timed Charges    72808 - OT Therapeutic Exercise Minutes  12  -      66645 - OT Therapeutic Activity Minutes  15  -      83782 - OT Self Care/Mgmt Minutes  8  -        User Key  (r) = Recorded By, (t) = Taken By, (c) = Cosigned By    Initials Name Provider Type     Akilah Gutierrez Occupational Therapist         Therapy Charges for Today     Code Description Service Date Service Provider Modifiers Qty    08987055425 HC OT THERAPEUTIC ACT EA 15 MIN 3/18/2021 Akilah Gutierrez 2    [id number]  OT THER PROC EA 15 MIN 3/19/2021 Akilah Gutierrez 1    [id number]  OT THERAPEUTIC ACT EA 15 MIN 3/19/2021 Akilah Gutierrez 1               Akilah Gutierrez  3/19/2021

## 2021-03-19 NOTE — PROGRESS NOTES
In Patient Consult      Date of Consultation: 2021  Patient Name: Jani Pena  MRN: [id number]  : 1950     Referring provider: Joshua Mendez DO    Primary care provider:  Miguel Rojas MD    Reason for consultation: esophageal dysmotility    Interval history:  3/19/2021  He has been eating ok per his report since yesterday. He still has dysphagia with any solid foods. Sometimes still has regurgitation. He is still having difficulty breathing, improved since admission. Still no BM per his report, drank Miralax 2 doses yesterday. He prefers not to have a suppository because it is difficult for him to get out bed to go to bathroom quickly. He is not drinking any caffeine, stopped coffee years ago, drinks soda rarely.     3/18/2021   Mr. Pena is a 70-year-old male with past medical history of COPD with recent exacerbation, CHF, atrial fibrillation on Plavix and aspirin and known esophageal dysmotility.  He presented to the ED on 3/15/2021 due to coughing and hemoptysis.  He was admitted to the hospital, placed on BiPAP and antibiotics and admitted to the hospital for further management.     He has had difficulty swallowing for several years now.  He has had several EGDs in the past with dilatations of the esophagus without significant improvement.  He would notice temporary improvement in dysphagia for approximately 2 to 3 months after dilatation.  He has been managing this condition over the past few months with avoiding breads and meats.  He is careful with eating and tries to eat slowly.  He had great difficulty this morning while eating scrambled eggs and had regurgitation.  He feels that the food will get stuck and points to his neck or mid chest area.  He denies any current acid reflux or heartburn symptoms.  He does take Protonix 40 mg once daily.  His last EGD was completed by Dr. Madrid on 10/20/2020 and showed 1 cm hiatal hernia, subtle age-related concentric rings  "and tertiary contractions, dilatation was performed.  He was scheduled in [place] to have esophageal manometry testing but rescheduled at first due to weather and had it rescheduled to 3/16, planned to complete this procedure before he was admitted to the hospital this week.     He denies any current abdominal pain, nausea or overt vomiting.  Denies any current rectal bleeding or melena.  He has not noticed any weight loss.  He does have some constipation which has become worse since being hospitalized.  He has not had a bowel movement in the past 5 to 6 days.  He usually takes MiraLAX as needed at home and he received his first dose of MiraLAX yesterday without any bowel movement since.     The patient had EGD in January 2019 by Dr. Oneil with gastroparesis and presbyesophagus noted. He had another EGD in [place] in October 2019 and had esophagus \"stretched\" (we do not have those records). The patient had EGD in December 2019 by Dr. Moe that was  \"normal\". The patient had colonoscopy in December 2019 by Dr. Moe with 1 benign mucosal tag removed, otherwise unremarkable.    Subjective     Past Medical History:   Diagnosis Date   • Abnormal liver enzymes    • Anemia    • Anxiety    • Arthritis    • Atherosclerotic heart disease of native coronary artery without angina pectoris    • Atrial fibrillation (CMS/HCC)    • Back pain    • BPH (benign prostatic hyperplasia)    • Cataract, bilateral    • Chest pain     2-3 MONTHS AGO.  PER PT, HAS ADDRESSED WITH CARDIOLOGIST.  STATES HAS NTG PRN.   • CHF (congestive heart failure) (CMS/HCC)    • Chronic bronchitis (CMS/HCC)    • Chronic kidney disease    • COPD (chronic obstructive pulmonary disease) (CMS/HCC)    • Degeneration of cervical intervertebral disc    • Depression    • Diverticulosis    • Dyspnea    • Esophageal reflux    • Esophagitis    • Gastritis    • Hematuria    • Hemorrhoids    • Hiatal hernia    • History of arthritis    • History of nuclear stress " test     UNSURE OF DATE   • History of peripheral neuropathy    • History of ventricular septal defect    • History of ventricular septal defect    • Hyperlipidemia    • Hypertension    • Impaired functional mobility, balance, gait, and endurance    • Infectious diarrhea    • Kyphosis, acquired    • Lumbar radiculopathy    • Mitral and aortic valve disease    • Nephrolithiasis    • Phimosis    • Problems with swallowing     WITH FOOD   • Respiratory failure (CMS/HCC)    • Sleep apnea     BIPAP, too awkward to bring   • TIA (transient ischemic attack)     X3.   2014   • Ventral hernia    • Wears glasses     FOR READING       Past Surgical History:   Procedure Laterality Date   • CARDIAC CATHETERIZATION     • CARDIAC CATHETERIZATION N/A 5/24/2018    Procedure: Left Heart Cath;  Surgeon: Edouard Robertson MD;  Location:  [facility];  Service: Cardiovascular   • CARDIAC ELECTROPHYSIOLOGY PROCEDURE N/A 1/31/2019    Procedure: PACEMAKER IMPLANTATION- DC;  Surgeon: Omar Encinas DO;  Location:  [facility];  Service: Cardiology   • COLONOSCOPY N/A 12/28/2019    Procedure: COLONOSCOPY WITH HOT POLYPECTOMY;  Surgeon: Celio Maldonado MD;  Location: [facility] ENDOSCOPY;  Service: Gastroenterology   • ENDOSCOPY N/A 1/26/2019    Procedure: ESOPHAGOGASTRODUODENOSCOPY;  Surgeon: Brunner, Mark I, MD;  Location:  [facility] ENDOSCOPY;  Service: Gastroenterology   • ENDOSCOPY N/A 12/27/2019    Procedure: ESOPHAGOGASTRODUODENOSCOPY;  Surgeon: Valdemar Mcdonald MD;  Location: [facility] ENDOSCOPY;  Service: General   • ENDOSCOPY N/A 10/20/2020    Procedure: ESOPHAGOGASTRODUODENOSCOPY WITH DILATION AND COLD FORCEP BIOPSY;  Surgeon: Cale Madrid MD;  Location: [facility] ENDOSCOPY;  Service: Gastroenterology;  Laterality: N/A;   • HERNIA REPAIR     • ORTHOPEDIC SURGERY Left     Left hip arthroplasty   • PACEMAKER IMPLANTATION  01/31/2019   • SUPRAPUBIC CATHETER INSERTION      History of Percutaneous Catheter  Placement Into Ureter   • THROAT SURGERY      FOR SLEEP APNEA   • VSD REPAIR      History of VSD Repair By Patch Closure       Family History   Problem Relation Age of Onset   • Other Father         CABG   • Coronary artery disease Father    • Colon cancer Neg Hx        Social History     Socioeconomic History   • Marital status:      Spouse name: Not on file   • Number of children: Not on file   • Years of education: Not on file   • Highest education level: Not on file   Tobacco Use   • Smoking status: Former Smoker     Packs/day: 3.00     Years: 30.00     Pack years: 90.00     Types: Cigarettes     Quit date:      Years since quittin.2   • Smokeless tobacco: Never Used   Vaping Use   • Vaping Use: Never used   Substance and Sexual Activity   • Alcohol use: No   • Drug use: No   • Sexual activity: Defer         Current Facility-Administered Medications:   •  acetaminophen (TYLENOL) tablet 650 mg, 650 mg, Oral, Q4H PRN, 650 mg at 21 0540 **OR** acetaminophen (TYLENOL) 160 MG/5ML solution 650 mg, 650 mg, Oral, Q4H PRN, 650 mg at 21 0612 **OR** acetaminophen (TYLENOL) suppository 650 mg, 650 mg, Rectal, Q4H PRN, Bowling-Fredrick, Monse, APRN  •  albuterol (PROVENTIL) nebulizer solution 0.083% 2.5 mg/3mL, 2.5 mg, Nebulization, Q6H PRN, Chika Rinaldi MD  •  ALPRAZolam (XANAX) tablet 0.25 mg, 0.25 mg, Oral, BID PRN, Db Luis DO, 0.25 mg at 21  •  amLODIPine (NORVASC) tablet 5 mg, 5 mg, Oral, Q24H, Bowling-Fredrick, Monse, APRN, 5 mg at 21 0838  •  arformoterol (BROVANA) nebulizer solution 15 mcg, 15 mcg, Nebulization, BID - RT, Slim Toribio MD, 15 mcg at 21 0631  •  aspirin chewable tablet 81 mg, 81 mg, Oral, Daily, Bowling-Fredrick, Monse, APRN, 81 mg at 21 0838  •  atorvastatin (LIPITOR) tablet 10 mg, 10 mg, Oral, Q PM, Monse Evans, APRN, 10 mg at 21  •  benzonatate (TESSALON) capsule 200 mg, 200 mg, Oral, TID PRN,  Db Luis, , 200 mg at 03/17/21 2046  •  budesonide (PULMICORT) nebulizer solution 0.5 mg, 0.5 mg, Nebulization, BID - RT, Slim Toribio MD, 0.5 mg at 03/19/21 0630  •  clopidogrel (PLAVIX) tablet 75 mg, 75 mg, Oral, Daily, Bowling-Fredrick, Monse, APRN, 75 mg at 03/18/21 0838  •  cyclobenzaprine (FLEXERIL) tablet 5 mg, 5 mg, Oral, TID PRN, Joshua Mendez, DO  •  diphenhydrAMINE-zinc acetate 2-0.1 % cream, , Topical, TID PRN, Joshua Mendez, DO  •  docusate sodium (COLACE) capsule 100 mg, 100 mg, Oral, BID PRN, Bowling-Fredrick, Monse, APRN, 100 mg at 03/17/21 0933  •  escitalopram (LEXAPRO) tablet 10 mg, 10 mg, Oral, Daily, Bowling-Fredrick, Monse, APRN, 10 mg at 03/18/21 0838  •  famotidine (PEPCID) tablet 40 mg, 40 mg, Oral, Daily, Bowling-Fredrick, Monse, APRN, 40 mg at 03/18/21 0838  •  ferrous sulfate EC tablet 324 mg, 324 mg, Oral, Daily With Breakfast, Bowling-Fredrick, Monse, APRN, 324 mg at 03/18/21 0838  •  finasteride (PROSCAR) tablet 5 mg, 5 mg, Oral, Daily, Bowling-Fredrick, Monse, APRN, 5 mg at 03/18/21 0838  •  HYDROcodone-acetaminophen (NORCO) 5-325 MG per tablet 1 tablet, 1 tablet, Oral, Q8H PRN, Bowling-Fredrick, Monse, APRN, 1 tablet at 03/16/21 1224  •  ipratropium-albuterol (DUO-NEB) nebulizer solution 3 mL, 3 mL, Nebulization, Q6H - RT, Bowling-Fredrick, Monse, APRN, 3 mL at 03/19/21 0629  •  lidocaine (LIDODERM) 5 % 1 patch, 1 patch, Transdermal, Q24H, Monse Evans APRN, 1 patch at 03/18/21 1701  •  magnesium citrate solution 15 mL, 15 mL, Oral, Daily PRN, Joshua Mendez, DO  •  melatonin tablet 5 mg, 5 mg, Oral, Nightly, Joshua Mendez, DO, 5 mg at 03/18/21 2053  •  mirtazapine (REMERON) tablet 15 mg, 15 mg, Oral, Nightly, Joshua Mendez, DO, 15 mg at 03/18/21 2053  •  ondansetron (ZOFRAN) injection 4 mg, 4 mg, Intravenous, Q6H PRN, Monse Evans APRN  •  oxybutynin (DITROPAN) tablet 5 mg, 5 mg, Oral, Nightly, Monse Evans, PAL, 5 mg  at 03/18/21 2053  •  pantoprazole (PROTONIX) EC tablet 40 mg, 40 mg, Oral, QAM AC, Joshua Mendez, DO, 40 mg at 03/19/21 0637  •  polyethylene glycol (MIRALAX) packet 17 g, 17 g, Oral, BID, America Lane PA-C, 17 g at 03/18/21 2053  •  potassium chloride (MICRO-K) CR capsule 10 mEq, 10 mEq, Oral, Daily, Bowling-Fredrick, Monse, APRN, 10 mEq at 03/18/21 0838  •  predniSONE (DELTASONE) tablet 40 mg, 40 mg, Oral, Daily With Breakfast, Chika Rinaldi MD, 40 mg at 03/18/21 1126  •  sodium chloride 0.9 % flush 10 mL, 10 mL, Intravenous, PRN, Edouard Durán MD  •  [COMPLETED] Insert peripheral IV, , , Once **AND** sodium chloride 0.9 % flush 10 mL, 10 mL, Intravenous, PRN, Hanna Silva APRN  •  sodium chloride 0.9 % flush 3 mL, 3 mL, Intravenous, Q12H, Bowling-Fredrick, Monse, APRN, 3 mL at 03/18/21 2054  •  sodium chloride 0.9 % flush 3-10 mL, 3-10 mL, Intravenous, PRN, Bowling-Fredrick, Monse, APRN  •  tamsulosin (FLOMAX) 24 hr capsule 0.4 mg, 0.4 mg, Oral, Nightly, Bowling-Fredrick, Monse, APRN, 0.4 mg at 03/18/21 2053  •  traZODone (DESYREL) tablet 50 mg, 50 mg, Oral, Q PM, Bowling-Fredrick, Monse, APRN, 50 mg at 03/18/21 2053    Allergies   Allergen Reactions   • Levaquin [Levofloxacin] Hives   • Sulfa Antibiotics Nausea And Vomiting       Review of Systems   Constitutional: Negative for appetite change, chills and fatigue.   Gastrointestinal: Positive for constipation and GERD. Negative for abdominal pain, nausea and vomiting.       The following portions of the patient's history were reviewed and updated as appropriate: allergies, current medications, past family history, past medical history, past social history, past surgical history and problem list.    Objective     Vitals:    03/19/21 0515 03/19/21 0629 03/19/21 0649 03/19/21 0747   BP:    151/78   BP Location:    Left arm   Patient Position:    Lying   Pulse: 89 80  117   Resp:  18 16 18   Temp:    97.8 °F (36.6 °C)   TempSrc:    Oral    SpO2: 90% 98%  93%   Weight: 83 kg (182 lb 15.7 oz)      Height:           Physical Exam  Vitals reviewed.   Constitutional:       General: He is not in acute distress.     Appearance: Normal appearance. He is well-developed. He is not ill-appearing or diaphoretic.      Comments: pleasant   HENT:      Head: Normocephalic and atraumatic.      Right Ear: External ear normal.      Left Ear: External ear normal.      Nose: Nose normal.   Eyes:      General: No scleral icterus.        Right eye: No discharge.         Left eye: No discharge.      Conjunctiva/sclera: Conjunctivae normal.      Comments: Ectropion, bilateral   Neck:      Vascular: No JVD.   Cardiovascular:      Rate and Rhythm: Normal rate and regular rhythm.      Heart sounds: Normal heart sounds. No murmur heard.   No friction rub. No gallop.    Pulmonary:      Effort: Respiratory distress (mild) present.      Breath sounds: No rales.   Chest:      Chest wall: No tenderness.   Abdominal:      General: Bowel sounds are normal. There is no distension.      Palpations: Abdomen is soft. There is no mass.      Tenderness: There is abdominal tenderness (mild, generalized). There is no guarding.   Musculoskeletal:         General: No deformity.      Cervical back: Normal range of motion.      Right lower leg: No edema.      Left lower leg: No edema.   Skin:     General: Skin is warm and dry.      Findings: No erythema or rash.   Neurological:      Mental Status: He is alert and oriented to person, place, and time.      Coordination: Coordination normal.   Psychiatric:         Mood and Affect: Mood normal.         Behavior: Behavior normal.         Thought Content: Thought content normal.         Judgment: Judgment normal.         Results from last 7 days   Lab Units 03/19/21  0513 03/18/21  0842 03/17/21  0523 03/15/21  0833   SODIUM mmol/L 140 137 141 143   POTASSIUM mmol/L 5.2 5.0 5.1 4.3   CHLORIDE mmol/L 100 96* 99 95*   CO2 mmol/L 34.1* 30.3* 35.8* 38.2*    BUN mg/dL 46* 47* 49* 40*   CREATININE mg/dL 1.86* 2.00* 2.24* 2.09*   CALCIUM mg/dL 8.8 8.6 8.8 9.4   ALBUMIN g/dL 3.80 4.40 4.00 4.60   BILIRUBIN mg/dL  --   --   --  0.8   ALK PHOS U/L  --   --   --  62   ALT (SGPT) U/L  --   --   --  10   AST (SGOT) U/L  --   --   --  18   GLUCOSE mg/dL 96 131* 129* 133*   WBC 10*3/mm3 9.90 13.32* 10.99* 7.36   HEMOGLOBIN g/dL 10.9* 12.3* 11.1* 12.9*   PLATELETS 10*3/mm3 180 205 188 202       Imaging Results (Last 24 Hours)     ** No results found for the last 24 hours. **         EGD dated 10/20/2020 no endoscopic esophageal abnormality to explain patient's dysphagia except subtle age-related concentric rings and tertiary contractions.  Esophagus dilated.  Erythematous mucosa in the antrum.  Patient likely has age-related presbyesophagus, no convincing signs of achalasia.  Gastric antrum biopsy with mild reactive gastropathy.  No H. pylori.  Esophagus biopsies distal mid and proximal with squamous mucosa with mild nonspecific chronic inflammation.  Negative for dysplasia, carcinoma or increased intraepithelial eosinophils.    Assessment / Plan    Assessment/Recommendations:  3/19/2021  1. Esophageal dysphagia  Patient has a long history of dysphagia complaints.  His last EGD with dilatation was completed in 10/2020.  He had temporary relief after this dilatation was completed as he has with previous dilatations.  He has not had any notable weight loss.  Due to current respiratory status and risk of sedation with EGD, we will not recommend endoscopy to be completed at this time. Patient is agreeable to this plan.      Patient should proceed with planned esophageal manometry testing with Dr. Tinsley as long as his respiratory status improves. He will reschedule this after discharge.      COPD can definitely increase his dysphagia due to difficulty breathing while eating. It is recommended that he follow instructions for dysphagia with a respiratory problem. These instructions  were printed and given to patient today.      2. GERD  Acid reflux is controlled with Protonix 40 mg once daily, will continue.   He was instructed not to lie down immediately after eating (wait at least 3 hours after meals), elevate the head of the bed at night, avoid spicy foods, avoid mints, continue avoiding caffeine, avoid nicotine and work on getting to a healthy weight.      3. Chronic idiopathic constipation  He has history of constipation and takes MiraLAX only as needed at home.  He has not had a bowel movement the past 1 week.   Recommend 2 doses of MiraLAX daily.   If no BM today, pt is agreeable to take dulcolax suppository tomorrow.               Thank you very much for letting me participate in the care of this patient.  Please do not hesitate to call me if you have any questions.    America Lane PA-C  [facility]  3/19/2021  08:21 EDT    Please note that portions of this note may have been completed with a voice recognition program.

## 2021-03-19 NOTE — PLAN OF CARE
Goal Outcome Evaluation:  Plan of Care Reviewed With: patient  Progress: improving  Outcome Summary: Patient performs 128 feet of gait with 6 LPM of oxygen, CGA and RW with cues for breathing coordination.  Patient performed BLE exercises and maintained oxygen saturation levels above 88%.  He is expected to benefit from additional PT services.

## 2021-03-19 NOTE — THERAPY TREATMENT NOTE
Patient Name: Jani Pena  : 1950    MRN: [id number]                              Today's Date: 3/19/2021       Admit Date: 3/15/2021    Visit Dx:     ICD-10-CM ICD-9-CM   1. Acute respiratory failure, unspecified whether with hypoxia or hypercapnia (CMS/HCC)  J96.00 518.81   2. Chronic renal impairment, unspecified CKD stage  N18.9 585.9   3. Urinary tract infection without hematuria, site unspecified  N39.0 599.0   4. History of COPD  Z87.09 V12.69   5. Oxygen dependent  Z99.81 V46.2     Patient Active Problem List   Diagnosis   • Anxiety   • Atrial fibrillation (CMS/HCC)   • Chronic kidney disease   • Steroid-dependent COPD (CMS/HCC)   • Degeneration of cervical intervertebral disc   • Gastroesophageal reflux disease   • Diverticulosis   • Hemorrhoids   • Hiatal hernia   • Hyperlipidemia   • Kyphosis, acquired   • PEDRO on CPAP   • Benign prostatic hyperplasia with incomplete bladder emptying   • Depression   • History of ventricular septal defect   • Chronic diastolic congestive heart failure (CMS/HCC)   • Coronary artery disease involving native coronary artery of native heart with angina pectoris (CMS/HCC)   • Lumbar radiculopathy   • Anemia   • Cardiac pacemaker in situ   • Sick sinus syndrome (CMS/HCC)   • Acute renal failure (ARF) (CMS/HCC)   • Chronic respiratory failure with hypoxia and hypercapnia (CMS/HCC)   • Hematoma   • Flexural eczema   • Xerosis of skin   • Abnormal urinalysis   • Esophageal dysphagia   • Fall   • Presbyesophagus   • Sepsis secondary to UTI (CMS/HCC)   • Acute respiratory failure (CMS/HCC)   • Acute on chronic respiratory failure with hypercapnia (CMS/HCC)   • Overweight (BMI 25.0-29.9)     Past Medical History:   Diagnosis Date   • Abnormal liver enzymes    • Anemia    • Anxiety    • Arthritis    • Atherosclerotic heart disease of native coronary artery without angina pectoris    • Atrial fibrillation (CMS/HCC)    • Back pain    • BPH (benign prostatic hyperplasia)     • Cataract, bilateral    • Chest pain     2-3 MONTHS AGO.  PER PT, HAS ADDRESSED WITH CARDIOLOGIST.  STATES HAS NTG PRN.   • CHF (congestive heart failure) (CMS/HCC)    • Chronic bronchitis (CMS/HCC)    • Chronic kidney disease    • COPD (chronic obstructive pulmonary disease) (CMS/HCC)    • Degeneration of cervical intervertebral disc    • Depression    • Diverticulosis    • Dyspnea    • Esophageal reflux    • Esophagitis    • Gastritis    • Hematuria    • Hemorrhoids    • Hiatal hernia    • History of arthritis    • History of nuclear stress test     UNSURE OF DATE   • History of peripheral neuropathy    • History of ventricular septal defect    • History of ventricular septal defect    • Hyperlipidemia    • Hypertension    • Impaired functional mobility, balance, gait, and endurance    • Infectious diarrhea    • Kyphosis, acquired    • Lumbar radiculopathy    • Mitral and aortic valve disease    • Nephrolithiasis    • Phimosis    • Problems with swallowing     WITH FOOD   • Respiratory failure (CMS/HCC)    • Sleep apnea     BIPAP, too awkward to bring   • TIA (transient ischemic attack)     X3.   2014   • Ventral hernia    • Wears glasses     FOR READING     Past Surgical History:   Procedure Laterality Date   • CARDIAC CATHETERIZATION     • CARDIAC CATHETERIZATION N/A 5/24/2018    Procedure: Left Heart Cath;  Surgeon: Edouard Robertson MD;  Location:  [facility];  Service: Cardiovascular   • CARDIAC ELECTROPHYSIOLOGY PROCEDURE N/A 1/31/2019    Procedure: PACEMAKER IMPLANTATION- DC;  Surgeon: Omar Encinas DO;  Location:  [facility];  Service: Cardiology   • COLONOSCOPY N/A 12/28/2019    Procedure: COLONOSCOPY WITH HOT POLYPECTOMY;  Surgeon: Celio Maldonado MD;  [facility]:  [facility];  Service: Gastroenterology   • ENDOSCOPY N/A 1/26/2019    Procedure: ESOPHAGOGASTRODUODENOSCOPY;  Surgeon: Brunner, Mark I, MD;  Location:  [facility];  Service: Gastroenterology   •  ENDOSCOPY N/A 12/27/2019    Procedure: ESOPHAGOGASTRODUODENOSCOPY;  Surgeon: Valdemar Mcdonald MD;  Location: [facility] ENDOSCOPY;  Service: General   • ENDOSCOPY N/A 10/20/2020    Procedure: ESOPHAGOGASTRODUODENOSCOPY WITH DILATION AND COLD FORCEP BIOPSY;  Surgeon: Cale Madrid MD;  Location: [facility] ENDOSCOPY;  Service: Gastroenterology;  Laterality: N/A;   • HERNIA REPAIR     • ORTHOPEDIC SURGERY Left     Left hip arthroplasty   • PACEMAKER IMPLANTATION  01/31/2019   • SUPRAPUBIC CATHETER INSERTION      History of Percutaneous Catheter Placement Into Ureter   • THROAT SURGERY      FOR SLEEP APNEA   • VSD REPAIR      History of VSD Repair By Patch Closure     General Information     Row Name 03/19/21 1023          Physical Therapy Time and Intention    Document Type  therapy note (daily note)  -JR     Mode of Treatment  physical therapy  -     Row Name 03/19/21 1023          General Information    Patient Profile Reviewed  yes  -JR       User Key  (r) = Recorded By, (t) = Taken By, (c) = Cosigned By    Initials Name Provider Type    JR Kaylee Kurtz PT Physical Therapist        Mobility     Row Name 03/19/21 1023          Bed Mobility    Bed Mobility  supine-sit  -JR     Supine-Sit [place] (Bed Mobility)  modified independence  -JR     Assistive Device (Bed Mobility)  head of bed elevated  -JR     Row Name 03/19/21 1023          Sit-Stand Transfer    Sit-Stand [place] (Transfers)  contact guard  -JR     Assistive Device (Sit-Stand Transfers)  walker, front-wheeled  -JR     Row Name 03/19/21 1023          Gait/Stairs (Locomotion)    [place] Level (Gait)  contact guard  -JR     Assistive Device (Gait)  walker, front-wheeled  -JR     Distance in Feet (Gait)  128  -JR     Deviations/Abnormal Patterns (Gait)  bilateral deviations;festinating/shuffling;base of support, wide  -JR     Bilateral Gait Deviations  forward flexed posture;heel strike decreased  -JR       User Key  (r) = Recorded By, (t)  = Taken By, (c) = Cosigned By    Initials Name Provider Type     Kaylee Kurtz, PT Physical Therapist        Obj/Interventions     Row Name 03/19/21 1023          Motor Skills    Motor Skills  therapeutic exercise  -     Therapeutic Exercise  hip;ankle;knee  -     Row Name 03/19/21 1023          Hip (Therapeutic Exercise)    Hip (Therapeutic Exercise)  strengthening exercise  -     Hip Strengthening (Therapeutic Exercise)  bilateral;marching while seated;10 repetitions  -     Row Name 03/19/21 1023          Knee (Therapeutic Exercise)    Knee (Therapeutic Exercise)  strengthening exercise  -     Knee Strengthening (Therapeutic Exercise)  bilateral;LAQ (long arc quad);10 repetitions  -     Row Name 03/19/21 1023          Ankle (Therapeutic Exercise)    Ankle (Therapeutic Exercise)  strengthening exercise  -     Ankle Strengthening (Therapeutic Exercise)  bilateral  -     Row Name 03/19/21 1023          Balance    Balance Assessment  sitting static balance;sitting dynamic balance;standing static balance;standing dynamic balance  -     Static Sitting Balance  WFL  -     Dynamic Sitting Balance  WFL  -JR     Static Standing Balance  WFL  -JR     Dynamic Standing Balance  mild impairment  -     Balance Interventions  standing;supported  -       User Key  (r) = Recorded By, (t) = Taken By, (c) = Cosigned By    Initials Name Provider Type    Kaylee Lemos, PT Physical Therapist        Goals/Plan    No documentation.       Clinical Impression     Row Name 03/19/21 1023          Pain    Additional Documentation  Pain Scale: Numbers Pre/Post-Treatment (Group)  -[facility] Name 03/19/21 1023          Pain Scale: Numbers Pre/Post-Treatment    Pretreatment Pain Rating  0/10 - no pain  -     Posttreatment Pain Rating  0/10 - no pain  -     Row Name 03/19/21 1023          Plan of Care Review    Plan of Care Reviewed With  patient  -JR     Progress  improving  -     Outcome Summary  Patient performs  128 feet of gait with 6 LPM of oxygen, CGA and RW with cues for breathing coordination.  Patient performed BLE exercises and maintained oxygen saturation levels above 88%.  He is expected to benefit from additional PT services.  -JR     Row Name 03/19/21 1023          Vital Signs    Pre SpO2 (%)  96  -JR     O2 Delivery Pre Treatment  supplemental O2 5 LPM  -JR     Intra SpO2 (%)  88  -JR     O2 Delivery Intra Treatment  supplemental O2 6LPM  -JR     Post SpO2 (%)  96  -JR     O2 Delivery Post Treatment  supplemental O2 5 LPM  -JR     Pre Patient Position  Supine  -JR     Intra Patient Position  Standing  -JR     Post Patient Position  Sitting  -JR     Row Name 03/19/21 1023          Positioning and Restraints    Pre-Treatment Position  in bed  -JR     Post Treatment Position  chair  -JR     In Chair  sitting;call light within reach;encouraged to call for assist  -JR       User Key  (r) = Recorded By, (t) = Taken By, (c) = Cosigned By    Initials Name Provider Type    Kaylee Lemos, WILFRED Physical Therapist        Outcome Measures     Row Name 03/19/21 1023          How much help from another person do you currently need...    Turning from your back to your side while in flat bed without using bedrails?  3  -JR     Moving from lying on back to sitting on the side of a flat bed without bedrails?  3  -JR     Moving to and from a bed to a chair (including a wheelchair)?  3  -JR     Standing up from a chair using your arms (e.g., wheelchair, bedside chair)?  3  -JR     Climbing 3-5 steps with a railing?  2  -JR     To walk in hospital room?  3  -JR     AM-PAC 6 Clicks Score (PT)  17  -JR     Row Name 03/19/21 1023          Functional Assessment    Outcome Measure Options  AM-PAC 6 Clicks Basic Mobility (PT)  -JR       User Key  (r) = Recorded By, (t) = Taken By, (c) = Cosigned By    Initials Name Provider Type    Kaylee Lemos PT Physical Therapist        Physical Therapy Education                 Title: PT OT SLP  Therapies (In Progress)     Topic: Physical Therapy (In Progress)     Point: Mobility training (Done)     Learning Progress Summary           Patient Acceptance, E,TB, VU by  at 3/17/2021 1545    Comment: Role of PT per POC                   Point: Home exercise program (Not Started)     Learner Progress:  Not documented in this visit.          Point: Body mechanics (Done)     Learning Progress Summary           Patient Acceptance, E,TB, VU by  at 3/19/2021 1743    Comment: Patient is instructed on posture with standing activities                   Point: Precautions (Not Started)     Learner Progress:  Not documented in this visit.                      User Key     Initials Effective Dates Name Provider Type Discipline     04/03/18 -  Kaylee Kurtz, PT Physical Therapist PT              PT Recommendation and Plan  Planned Therapy Interventions (PT): strengthening, balance training, bed mobility training, transfer training, gait training, home exercise program, patient/family education  Plan of Care Reviewed With: patient  Progress: improving  Outcome Summary: Patient performs 128 feet of gait with 6 LPM of oxygen, CGA and RW with cues for breathing coordination.  Patient performed BLE exercises and maintained oxygen saturation levels above 88%.  He is expected to benefit from additional PT services.     Time Calculation:   PT Charges     Row Name 03/19/21 1753             Time Calculation    Start Time  1023  -      Stop Time  1036  -      Time Calculation (min)  13 min  -      PT Received On  03/19/21  -      PT Goal Re-Cert Due Date  03/27/21  -        User Key  (r) = Recorded By, (t) = Taken By, (c) = Cosigned By    Initials Name Provider Type     Kaylee Kurtz, PT Physical Therapist        Therapy Charges for Today     Code Description Service Date Service Provider Modifiers Qty    [id number] HC GAIT TRAINING EA 15 MIN 3/19/2021 Kaylee Kurtz, PT GP 1          PT G-Codes  Outcome Measure  Options: AM-PAC 6 Clicks Daily Activity (OT)  AM-PAC 6 Clicks Score (PT): 17  AM-PAC 6 Clicks Score (OT): 22    Kaylee Kurtz, PT  3/19/2021

## 2021-03-19 NOTE — PROGRESS NOTES
[facility]    PROGRESS NOTE    Name:  Jani Pena   Age:  70 y.o.  Sex:  male  :  1950  MRN:  [id number]   Visit Number:  [id number]  Admission Date:  3/15/2021  Date Of Service:  21  Primary Care Physician:  Miguel Rojas MD     LOS: 2 days :  Patient Care Team:  Miguel Rojas MD as PCP - General (Internal Medicine)  Ulysses Bass MD as Cardiologist (Cardiology):    History taken from:     patient chart    Chief Complaint:      Dyspnea    Subjective:    Interval History:     Patient seen and examined again today.    Patient 70-year-old male with paroxysmal A. fib, chronic kidney disease stage IIIb, GERD, chronic hypoxic respiratory failure on home O2, chronic hypercapnic respiratory failure, sick sinus syndrome with pacemaker comes with shortness of breath and hemoptysis.  He has been fatigued and weak.  He continues to have shortness of breath.  He denies chest pressure, or pain.    He has COPD and is followed by pulmonology as outpatient.  He has no history of atrial fibrillation but is not on anticoagulation but is on aspirin Plavix.  Pulmonology is following.  Patient continues on Solu-Medrol, Brovana and Pulmicort have been added.  Change to oral steroids today.  He is on his baseline oxygen at 5 L at present.  Chest x-ray was done and reviewed, shows pulmonary venous hypertension and small effusions.    He had some nausea and vomiting this morning so GI was consulted.  He has esophageal motility issues was referred to manometry at [facility], [place] in [place], this will need to be rescheduled as an outpatient.  They recommend no EGD as the patient has significant respiratory issues at present.  They made recommendations for his diet. He has been following this, seems to be mildly improved.     CT scan of the chest showed no evidence of PE or dissection or pneumonia.  Patient does feel better.  He continues to wear BiPAP which has been adjusted by pulmonology.   His blood gases have improved he is no longer acidotic. Spoke to Dr. Toribio, he advised that the patient stay till tomorrow.  Patient's plan is to go back to [facility] when he is ready for discharge.  Patient is working with PT and OT at present.    Review of Systems:     All systems were reviewed and negative except for:  Respiratory: positive for  shortness of air    Objective:    Vital Signs:    Temp:  [96.9 °F (36.1 °C)-98.4 °F (36.9 °C)] 98 °F (36.7 °C)  Heart Rate:  [] 89  Resp:  [12-22] 20  BP: (105-151)/(66-88) 143/66    Physical Exam:    General: Alert and orient x4, mild distress  Heart: Paced rhythm.  No murmur rub or thrill noted.  Lungs: Occasional wheezes, decreased present bilaterally without use of accessory muscles respiration.  Abdomen: Soft/nontender/nondistended.  No HSM noted.  MSK: 4/5 strength in upper/lower extremity bilaterally.     Results Review:      I reviewed the patient's new clinical results.    Labs:    Results from last 7 days   Lab Units 03/19/21  0513 03/18/21  0842 03/17/21  0523 03/15/21  0833   SODIUM mmol/L 140 137 141 143   POTASSIUM mmol/L 5.2 5.0 5.1 4.3   CHLORIDE mmol/L 100 96* 99 95*   CO2 mmol/L 34.1* 30.3* 35.8* 38.2*   BUN mg/dL 46* 47* 49* 40*   CREATININE mg/dL 1.86* 2.00* 2.24* 2.09*   CALCIUM mg/dL 8.8 8.6 8.8 9.4   BILIRUBIN mg/dL  --   --   --  0.8   ALK PHOS U/L  --   --   --  62   ALT (SGPT) U/L  --   --   --  10   AST (SGOT) U/L  --   --   --  18   GLUCOSE mg/dL 96 131* 129* 133*     Results from last 7 days   Lab Units 03/19/21  0513 03/18/21  0842 03/17/21  0523   WBC 10*3/mm3 9.90 13.32* 10.99*   HEMOGLOBIN g/dL 10.9* 12.3* 11.1*   HEMATOCRIT % 36.0* 40.4 36.0*   PLATELETS 10*3/mm3 180 205 188         Results from last 7 days   Lab Units 03/15/21  2239 03/15/21  1650 03/15/21  0833   CK TOTAL U/L  --   --  77   TROPONIN T ng/mL 0.037* 0.040* 0.065*     Results from last 7 days   Lab Units 03/15/21  1007 03/15/21  0924 03/15/21  0923   BLOODCX   --  No  growth at 4 days No growth at 4 days   URINECX  50,000 CFU/mL Mixed [place] Isolated  --   --      Results from last 7 days   Lab Units 03/17/21  0739   PH, ARTERIAL pH units 7.356   PO2 ART mm Hg 85.0   PCO2, ARTERIAL mm Hg 63.7*   HCO3 ART mmol/L 35.6*        Radiology:    Imaging Results (Last 24 Hours)     ** No results found for the last 24 hours. **          Medication Review:     amLODIPine, 5 mg, Oral, Q24H  arformoterol, 15 mcg, Nebulization, BID - RT  aspirin, 81 mg, Oral, Daily  atorvastatin, 10 mg, Oral, Q PM  bisacodyl, 10 mg, Rectal, Daily  budesonide, 0.5 mg, Nebulization, BID - RT  clopidogrel, 75 mg, Oral, Daily  escitalopram, 10 mg, Oral, Daily  famotidine, 40 mg, Oral, Daily  ferrous sulfate, 324 mg, Oral, Daily With Breakfast  finasteride, 5 mg, Oral, Daily  ipratropium-albuterol, 3 mL, Nebulization, Q6H - RT  lidocaine, 1 patch, Transdermal, Q24H  melatonin, 5 mg, Oral, Nightly  mirtazapine, 15 mg, Oral, Nightly  oxybutynin, 5 mg, Oral, Nightly  pantoprazole, 40 mg, Oral, QAM AC  polyethylene glycol, 17 g, Oral, BID  potassium chloride, 10 mEq, Oral, Daily  predniSONE, 40 mg, Oral, Daily With Breakfast  senna-docusate sodium, 2 tablet, Oral, BID  sodium chloride, 3 mL, Intravenous, Q12H  tamsulosin, 0.4 mg, Oral, Nightly  traZODone, 50 mg, Oral, Q PM             Assessment:    Problem List Items Addressed This Visit        Other    Acute respiratory failure (CMS/[facility]) - Primary      Other Visit Diagnoses     Chronic renal impairment, unspecified CKD stage        Urinary tract infection without hematuria, site unspecified        History of COPD        Oxygen dependent              1.  Acute on chronic hypoxic and hypercapnic respiratory failure.  2.  COPD with exacerbation  3.  Possible UTI, culture contaminated.  4.  Elevation in troponin, type II MI, supply demand mismatch  5.  Sick sinus syndrome pacemaker in place  6.  Chronic kidney disease stage IIIb, baseline creatinine appears to be around  1.7.  7.  Atrial fibrillation, currently rate controlled.  Not on anticoagulation.  8.  Suspected esophageal dysmotility, referred for esophageal manometry by Dr. Tinsley at [facility].  Previous dilatations of the esophagus have been done, last in October.  9.  Coronary artery disease on medical management  10.  Probable acute bronchitis  11.  Fatty infiltration of the liver  12.  Hemoptysis      Plan:    Patient finished with antibioitics.   Pulmicort and Brovana have been added by pulmonology .  Change to prednisone, will taper over a couple of weeks.  Consulted gastroenterology , they recommend follow up at manometry clinic.  PT, OT, speech therapy to follow.  Continue with BiPAP at night.  Check lab work in a.m. Patient appears to be improving, hopefully patient can return to [facility] soon, possibly tomorrow.  Further recommendations will pend clinical course.    Joshua Mendez DO  03/19/21  15:06 EDT

## 2021-03-19 NOTE — PROGRESS NOTES
Adult Nutrition  Assessment/PES    Patient Name:  Jani Pena  YOB: 1950  MRN: [id number]  Admit Date:  3/15/2021    Assessment Date:  3/19/2021    Comments:    Recommend:  1. Consider adding renal modification to current diet order as medically appropriate and tolerated.  2. Encourage PO intake. Average intake ~75% x 7 meals.  3. Continue Novasource Renal daily.  4. Consider a renal multivitamin with minerals daily.      RD to follow pt and available PRN.    Reason for Assessment     Row Name 03/19/21 1607          Reason for Assessment    Reason For Assessment  follow-up protocol     Diagnosis  cardiac disease;pulmonary disease;liver disease;neurologic conditions;gastrointestinal disease A/CKD Stage 3b, GERD, Chronic hypoxic respiratory failure, COPD, Afib, CHF, Fatty infiltration of the liver, Suspected esophageal dysmotility, CAD     Identified At Risk by Screening Criteria  difficulty chewing/swallowing             Labs/Tests/Procedures/Meds     Row Name 03/19/21 1607          Labs/Procedures/Meds    Lab Results Reviewed  reviewed, pertinent     Lab Results Comments  High: BUN, Cr, Mg        Medications    Pertinent Medications Reviewed  reviewed, pertinent     Pertinent Medications Comments  Lipitor, Dulcolax, Pepcid, ferrous sulfate, Remeron, Protonix, Miralax, Prednisone, Pericolace,             Nutrition Prescription Ordered     Row Name 03/19/21 1608          Nutrition Prescription PO    Current PO Diet  Dysphagia     Dysphagia Level  4  Mechanical soft no mixed consistencies     Fluid Consistency  Thin     Supplement  Novasource Renal (N[place]ro)     Supplement Frequency  Daily     Common Modifiers  Cardiac         Evaluation of Received Nutrient/Fluid Intake     Row Name 03/19/21 1609          PO Evaluation    Number of Days PO Intake Evaluated  3 days     Number of Meals  7     % PO Intake  75               Problem/Interventions:  Problem 1     Row Name 03/19/21 1610          Nutrition  Diagnoses Problem 1    Problem 1  Impaired Nutrient Utilization     Etiology (related to)  Medical Diagnosis     Renal  CKD     Signs/Symptoms (evidenced by)  Biochemical     Specific Labs Noted  BUN;Creatinine;Mg++         Problem 2     Row Name 03/19/21 1610          Nutrition Diagnoses Problem 2    Problem 2  Needs Alternate Composition     Macronutrient  Kcal;Fiber;Protein;Carbohydrate;Fat     Micronutrient  Vitamin;Mineral     Etiology (related to)  Medical Diagnosis     Neurological  Other (comment) Suspected esophageal dysmotility     Signs/Symptoms (evidenced by)  Report/Observation     Reported/Observed By  MD             Intervention Goal     Row Name 03/19/21 1610          Intervention Goal    General  Meet nutritional needs for age/condition;Improved nutrition related lab(s)     PO  Meet estimated needs;Maintain intake     Weight  Maintain weight         Nutrition Intervention     Row Name 03/19/21 1610          Nutrition Intervention    RD/Tech Action  Follow Tx progress;Encourage intake;Recommend/ordered     Recommended/Ordered  Diet         Nutrition Prescription     Row Name 03/19/21 1610          Nutrition Prescription PO    PO Prescription  Begin/change diet     Begin/Change Diet to  Dysphagia     Fluid Consistency  Thin     Common Modifiers  Cardiac;Renal     New PO Prescription Ordered?  No, recommended        Other Orders    Obtain Weight  Daily     Obtain Weight Ordered?  No, recommended     Supplement  Vitamin mineral supplement Renal     Supplement Ordered?  No, recommended         Education/Evaluation     Row Name 03/19/21 1611          Education    Education  Will Instruct as appropriate        Monitor/Evaluation    Monitor  Per protocol;I&O;PO intake;Supplement intake;Pertinent labs;Skin status;Weight           Electronically signed by:  Rachel Simmons RD  03/19/21 16:11 EDT

## 2021-03-19 NOTE — PROGRESS NOTES
Continued Stay Note   [facility]     Patient Name: Jani Pena  MRN: [id number]  Today's Date: 3/19/2021    Admit Date: 3/15/2021    Discharge Plan     Row Name 03/19/21 1054       Plan    Plan Spoke to pt in room, coughing and expectorating.  States Dr Toribio states will not be DC today, but possibly Tomorrow or Sunday.  Pt states unsure if family could transport this weekend due to his grand daughters Soccer game.  Does not have portable o2 tanks with him but states he has plenty at [facility].  Would need at least 2 tanks brought to him when they come to get him. ([employer] supplies his O2) Called to [facility] tried to speak with Haleigh but spoke to Teresa who stated Haleigh was on the phone.  Explained above situation and per Teresa transportation would be something Haleigh would have to work out.  Call back information given to Teresa to have Haleigh call back concerning possible transport this weekend. Cm will continue to follow.   13:38 EDT  Received call back from Haleigh at [facility] and can only transport on weekends between the hours of 10a  and 2p.  Would need to know if he is being discharged as soon a possible.  If pt has new RX would need to get them by Sat as their pharmacy is closed.  Would not be able to get new meds til Monday.  H[place] also informed will need to bring o2 tanks when they come to pick pt up.  (Please remind them when they call report.) Call report to [phone] and fax dcs to [phone].  Dr Mendez updated, and Chika Hwang.          Discharge Codes    No documentation.       Expected Discharge Date and Time     Expected Discharge Date Expected Discharge Time    Mar 19, 2021             Belgica Barry RN

## 2021-03-20 NOTE — DISCHARGE SUMMARY
[facility]      Name:  Jani Pena   Age:  70 y.o.  Sex:  male  :  1950  MRN:  [id number]   Visit Number:  [id number]  Primary Care Physician:  Miguel Rojas MD  Date of Discharge:  3/20/2021  Admission Date:  3/15/2021      Discharge Diagnosis:     1.  Acute on chronic hypoxic and hypercapnic respiratory failure.  2.  COPD with exacerbation  3.  Possible UTI, culture contaminated.  4.  Elevation in troponin, type II MI, supply demand mismatch  5.  Sick sinus syndrome pacemaker in place  6.  Chronic kidney disease stage IIIb, baseline creatinine appears to be around 1.7.  7.  Atrial fibrillation, currently rate controlled.  Not on anticoagulation.  8.  Suspected esophageal dysmotility, referred for esophageal manometry by Dr. Tinsley at [facility].  Previous dilatations of the esophagus have been done, last in October.  9.  Coronary artery disease on medical management  10.  Probable acute bronchitis  11.  Fatty infiltration of the liver  12.  Hemoptysis, resolved  13.  Gallstone, asymptomatic.  Active Hospital Problems    Diagnosis  POA   • Acute respiratory failure (CMS/HCC) [J96.00]  Yes   • Acute on chronic respiratory failure with hypercapnia (CMS/HCC) [J96.22]  Yes   • Overweight (BMI 25.0-29.9) [E66.3]  Yes      Resolved Hospital Problems   No resolved problems to display.         Presenting Problem/History of Present Illness:    Acute respiratory failure, unspecified whether with hypoxia or hypercapnia (CMS/HCC) [J96.00]         Hospital Course:    Patient 70-year-old male with paroxysmal A. fib, chronic kidney disease stage IIIb, GERD, chronic hypoxic respiratory failure on home O2, chronic hypercapnic respiratory failure, sick sinus syndrome with pacemaker comes with shortness of breath and hemoptysis.  He has been fatigued and weak.  He continues to have shortness of breath.  He denies chest pressure, or pain.     He has COPD and is  followed by pulmonology as outpatient.  He has no history of atrial fibrillation but is not on anticoagulation but is on aspirin Plavix.  Pulmonology is following.  Patient continues on Solu-Medrol, Brovana and Pulmicort have been added.    Change back to oral steroids and oral Trelegy Ellipta at discharge.  He is on his baseline oxygen at 5 L at present.  Chest x-ray was done and reviewed, shows pulmonary venous hypertension and small effusions.     He had some nausea and vomiting so GI was consulted.  He has esophageal motility issues was referred to manometry at [facility] in [place], this will need to be rescheduled as an outpatient.  They recommend no EGD as the patient has significant respiratory issues at present.  They made recommendations for his diet. He has been following this, seems to be mildly improved.   Recommend continue to follow at home.     CT scan of the chest showed no evidence of PE or dissection or pneumonia.  Patient does feel better.  He continues to wear BiPAP which has been adjusted by pulmonology.  He should continue this at home. His blood gases have improved he is no longer acidotic. Spoke to Dr. Toribio, he cleared the patient for discharge today..  [facility]   Patient is working with PT and OT, he should continue this.    Patient is to follow-up with his PCP in 1 week.  Follow-up with Dr. Toribio in 2 weeks.  Follow up with Dr. Tinsley at the [facility] clinic for manometry testing.  Continue with his BiPAP at home.  Continue on 5 L of oxygen at home.  He is stable for discharge to be discharged home today.        Procedures Performed:           Consults:     Consults     Date and Time Order Name Status Description    3/18/2021 12:16 PM Inpatient Gastroenterology Consult      3/18/2021 10:15 AM Inpatient Gastroenterology Consult Completed     3/15/2021  4:16 PM Inpatient Pulmonology Consult            Pertinent Test Results:     Results from last 7  days   Lab Units 03/20/21  0549 03/19/21  0513 03/18/21  0842 03/15/21  0833   SODIUM mmol/L 140 140 137 143   POTASSIUM mmol/L 5.0 5.2 5.0 4.3   CHLORIDE mmol/L 100 100 96* 95*   CO2 mmol/L 36.2* 34.1* 30.3* 38.2*   BUN mg/dL 43* 46* 47* 40*   CREATININE mg/dL 1.56* 1.86* 2.00* 2.09*   CALCIUM mg/dL 8.9 8.8 8.6 9.4   BILIRUBIN mg/dL  --   --   --  0.8   ALK PHOS U/L  --   --   --  62   ALT (SGPT) U/L  --   --   --  10   AST (SGOT) U/L  --   --   --  18   GLUCOSE mg/dL 84 96 131* 133*     Results from last 7 days   Lab Units 03/20/21  0549 03/19/21  0513 03/18/21  0842   WBC 10*3/mm3 8.44 9.90 13.32*   HEMOGLOBIN g/dL 11.3* 10.9* 12.3*   HEMATOCRIT % 37.0* 36.0* 40.4   PLATELETS 10*3/mm3 173 180 205         Results from last 7 days   Lab Units 03/15/21  2239 03/15/21  1650 03/15/21  0833   CK TOTAL U/L  --   --  77   TROPONIN T ng/mL 0.037* 0.040* 0.065*     Results from last 7 days   Lab Units 03/15/21  0833   PROBNP pg/mL 2,693.0*         Results from last 7 days   Lab Units 03/15/21  0833   LIPASE U/L 38     Results from last 7 days   Lab Units 03/17/21  0739   PH, ARTERIAL pH units 7.356   PO2 ART mm Hg 85.0   PCO2, ARTERIAL mm Hg 63.7*   HCO3 ART mmol/L 35.6*     Results from last 7 days   Lab Units 03/15/21  1007   COLOR UA  Yellow   GLUCOSE UA  Negative   KETONES UA  Negative   LEUKOCYTES UA  Small (1+)*   PH, URINE  6.5   BILIRUBIN UA  Negative   UROBILINOGEN UA  0.2 E.U./dL     Pain Management Panel     Pain Management Panel Latest Ref Rng & Units 9/1/2019 8/29/2019    CREATININE UR mg/dL 90.1 -    AMPHETAMINES SCREEN, URINE Negative - Negative    BARBITURATES SCREEN Negative - Negative    BENZODIAZEPINE SCREEN, URINE Negative - Positive(A)    BUPRENORPHINEUR Negative - Negative    COCAINE SCREEN, URINE Negative - Negative    METHADONE SCREEN, URINE Negative - Negative    METHAMPHETAMINEUR Negative - Negative        Results from last 7 days   Lab Units 03/15/21  1007 03/15/21  0924 03/15/21  0923   BLOODCX    --  No growth at 4 days No growth at 4 days   URINECX  50,000 CFU/mL Mixed [place] Isolated  --   --        Imaging Results (All)     Procedure Component Value Units Date/Time    XR Chest 1 View [[id number]] Collected: 03/18/21 0832     Updated: 03/18/21 0836    Narrative:      PROCEDURE: XR CHEST 1 VW-     HISTORY: dyspnea; J96.00-Acute respiratory failure, unspecified whether  with hypoxia or hypercapnia; N18.9-Chronic kidney disease, unspecified;  N39.0-Urinary tract infection, site not specified; Z87.09-Personal  history of other diseases of the respiratory system; Z99.81-Dependence  on supplemental oxygen     COMPARISON: December 22, 2020.     FINDINGS: A left subclavian pacemaker is in place. A nerve stimulator  generator pack is seen in the right hemithorax. The heart is enlarged.  The mediastinum is unremarkable. There is pulmonary venous hypertension  and small effusions. There is no pneumothorax.  There are no acute  osseous abnormalities.       Impression:      Cardiomegaly with pulmonary venous hypertension and small  effusions.     Continued followup is recommended.     This report was finalized on 3/18/2021 8:34 AM by Richelle Patel M.D..    CT Chest Pulmonary Embolism [[id number]] Collected: 03/15/21 1036     Updated: 03/15/21 1528    Narrative:      PROCEDURE: CT CHEST PULMONARY EMBOLISM     HISTORY: PE suspected, low/intermediate prob, neg D-dimer     COMPARISON: December 2020.     TECHNIQUE: Multiple axial CT images were obtained from the thoracic  inlet through the upper abdomen following the administration of Isovue  300 per the CT PE protocol. Coronal and oblique 3D MIP images were  reconstructed from the original axial data set.      FINDINGS: There are no filling defects within the pulmonary arteries to  suggest an embolus. The thoracic aorta is normal in caliber with no  evidence of dissection. The heart is enlarged. There are no pleural or  pericardial effusions. There is no adenopathy.  There are emphysematous  changes. No focal opacities are identified. There is evidence of prior  granulomatous disease. Bone windows reveal no acute osseous  abnormalities.       Impression:      No evidence of pulmonary embolus or dissection.            700.58 mGy.cm        This study was performed with techniques to keep radiation doses as low  as reasonably achievable (ALARA). Individualized dose reduction  techniques using automated exposure control or adjustment of mA and/or  kV according to the patient size were employed.         PROCEDURE:  CT ABDOMEN PELVIS W CONTRAST-     HISTORY:  PE suspected, low/intermediate prob, neg D-dimer     COMPARISON:  None .     TECHNIQUE: Multiple axial CT images were obtained from the lung bases  through the pubic symphysis following the administration of Isovue 300  and oral contrast.      FINDINGS:      ABDOMEN: The liver shows fatty infiltration. There is a stone versus  polyp in the gallbladder. The spleen is unremarkable. No adrenal mass is  present.  The pancreas is normal. A left renal artery stent is present.  The kidneys are normal. The aorta is normal in caliber. There is no free  fluid or adenopathy. There is colonic diverticulosis without evidence of  acute diverticulitis.     PELVIS: The appendix is normal.  There is diffuse wall thickening of the  urinary bladder which may be due to chronic obstruction or neurogenic  bladder. There is no significant free fluid or adenopathy. There are  postsurgical changes of left hip arthroplasty.     IMPRESSION:  1. Diffuse wall thickening of the urinary bladder may be due to chronic  obstruction or neurogenic bladder.  2. Fatty infiltration of the liver.  3. Stone versus polyp in the gallbladder.     700.58 mGy.cm        This study was performed with techniques to keep radiation doses as low  as reasonably achievable (ALARA). Individualized dose reduction  techniques using automated exposure control or adjustment of mA  and/or  kV according to the patient size were employed.         Images were reviewed, interpreted, and dictated by Dr. Richelle Patel M.D.  Transcribed by Ariela Batista PA-C.     This report was finalized on 3/15/2021 3:26 PM by Richelle Patel M.D..    CT Abdomen Pelvis With Contrast [[id number]] Collected: 03/15/21 1036     Updated: 03/15/21 1528    Narrative:      PROCEDURE: CT CHEST PULMONARY EMBOLISM     HISTORY: PE suspected, low/intermediate prob, neg D-dimer     COMPARISON: December 2020.     TECHNIQUE: Multiple axial CT images were obtained from the thoracic  inlet through the upper abdomen following the administration of Isovue  300 per the CT PE protocol. Coronal and oblique 3D MIP images were  reconstructed from the original axial data set.      FINDINGS: There are no filling defects within the pulmonary arteries to  suggest an embolus. The thoracic aorta is normal in caliber with no  evidence of dissection. The heart is enlarged. There are no pleural or  pericardial effusions. There is no adenopathy. There are emphysematous  changes. No focal opacities are identified. There is evidence of prior  granulomatous disease. Bone windows reveal no acute osseous  abnormalities.       Impression:      No evidence of pulmonary embolus or dissection.            700.58 mGy.cm        This study was performed with techniques to keep radiation doses as low  as reasonably achievable (ALARA). Individualized dose reduction  techniques using automated exposure control or adjustment of mA and/or  kV according to the patient size were employed.         PROCEDURE:  CT ABDOMEN PELVIS W CONTRAST-     HISTORY:  PE suspected, low/intermediate prob, neg D-dimer     COMPARISON:  None .     TECHNIQUE: Multiple axial CT images were obtained from the lung bases  through the pubic symphysis following the administration of Isovue 300  and oral contrast.      FINDINGS:      ABDOMEN: The liver shows fatty infiltration. There  is a stone versus  polyp in the gallbladder. The spleen is unremarkable. No adrenal mass is  present.  The pancreas is normal. A left renal artery stent is present.  The kidneys are normal. The aorta is normal in caliber. There is no free  fluid or adenopathy. There is colonic diverticulosis without evidence of  acute diverticulitis.     PELVIS: The appendix is normal.  There is diffuse wall thickening of the  urinary bladder which may be due to chronic obstruction or neurogenic  bladder. There is no significant free fluid or adenopathy. There are  postsurgical changes of left hip arthroplasty.     IMPRESSION:  1. Diffuse wall thickening of the urinary bladder may be due to chronic  obstruction or neurogenic bladder.  2. Fatty infiltration of the liver.  3. Stone versus polyp in the gallbladder.     700.58 mGy.cm        This study was performed with techniques to keep radiation doses as low  as reasonably achievable (ALARA). Individualized dose reduction  techniques using automated exposure control or adjustment of mA and/or  kV according to the patient size were employed.         Images were reviewed, interpreted, and dictated by Dr. Richelle Patel M.D.  Transcribed by Ariela Batista PA-C.     This report was finalized on 3/15/2021 3:26 PM by Richelle Patel M.D..          Results for orders placed in visit on 12/29/20    Adult Transthoracic Echo Complete W/ Cont if Necessary Per Protocol    Interpretation Summary  · Estimated left ventricular EF = 25% Left ventricular systolic function is moderately decreased.  · The left ventricular cavity is severely dilated.  · Moderate mitral valve regurgitation is present.  · Moderate aortic valve regurgitation is present.      Results for orders placed during the hospital encounter of 01/20/19    Adult Transthoracic Echo Complete W/ Cont if Necessary Per Protocol    Interpretation Summary  Technically difficult study  1) Borderline LVH , moderate dilation of LV  "with mild reduction systolic function ( EF is about 45%)  2) Moderate left atrial enlargement with normal LVedp  3) Aortic sclerosis with mild AI  4) Mild MR  5) Mild TR with PAsp of 48 mm of hg  6) Mild dilation of the RV with normal RV Function  7) Severe hypokinesis of the inferior and septal walls      Results for orders placed during the hospital encounter of 12/24/17    Adult Transthoracic Echo Complete W/ Cont if Necessary Per Protocol    Interpretation Summary  Technically very limited study  1) Mild to moderate LVH with mild reduction in LV systolic function ( EF 45 to 50%)  2) Mild left atrial enlargement with normal LVedp  3) Mitral thickening with mild MR  4) Sclerosed mildly restricted aortic leaflets with mild AI  5) Mild TR with inabililty to assess pressures  6) Normal size and function of the RV  7) Apical segmental hypokinesis more so along inferior and septal walls      Condition on Discharge:      Stable    Vital Signs:    /68 (BP Location: Left arm, Patient Position: Lying)   Pulse 71   Temp 97.2 °F (36.2 °C) (Axillary)   Resp 14   Ht 172.7 cm (68\")   Wt 79.3 kg (174 lb 13.2 oz)   SpO2 99%   BMI 26.58 kg/m²     Physical Exam:  General: Alert and orient x4, mild distress  Heart: Paced rhythm.  No murmur rub or thrill noted.  Lungs: Occasional wheezes, decreased present bilaterally without use of accessory muscles respiration.  Abdomen: Soft/nontender/nondistended.  No HSM noted.  MSK: 4/5 strength in upper/lower extremity bilaterally.      Discharge Disposition:    Home to assisted living at [facility]    Discharge Medication:       Discharge Medications      New Medications      Instructions Start Date   famotidine 40 MG tablet  Commonly known as: PEPCID   40 mg, Oral, Daily      sennosides-docusate 8.6-50 MG per tablet  Commonly known as: PERICOLACE   2 tablets, Oral, 2 Times Daily         Changes to Medications      Instructions Start Date   benzonatate 200 MG capsule  Commonly " known as: TESSALON  What changed: reasons to take this   TAKE ONE CAPSULE BY MOUTH THREE TIMES A DAY AS NEEDED FOR COUGH      polyethylene glycol 17 g packet  Commonly known as: MIRALAX  What changed: Another medication with the same name was added. Make sure you understand how and when to take each.   17 g, Oral, Daily      polyethylene glycol 17 g packet  Commonly known as: MIRALAX  What changed: You were already taking a medication with the same name, and this prescription was added. Make sure you understand how and when to take each.   17 g, Oral, 2 Times Daily         Continue These Medications      Instructions Start Date   Acetaminophen Extra Strength 500 MG tablet  Generic drug: acetaminophen   TAKE 1 TABLET BY MOUTH EVERY 6 HOURS AS NEEDED FOR MILD PAIN      alfuzosin 10 MG 24 hr tablet  Commonly known as: UROXATRAL   @@ TAKE ONE TABLET BY MOUTH DAILY      ALPRAZolam 0.25 MG tablet  Commonly known as: XANAX   TAKE ONE TABLET BY MOUTH TWO TIMES A DAY AS NEEDED      amLODIPine 5 MG tablet  Commonly known as: NORVASC   1 and 1/2 tab daily po      Aspirin Low Dose 81 MG chewable tablet  Generic drug: aspirin   @@ TAKE ONE TABLET BY MOUTH DAILY      atorvastatin 10 MG tablet  Commonly known as: LIPITOR   10 mg, Oral, Every Evening      carboxymethylcellulose 0.5 % solution  Commonly known as: REFRESH PLUS   1 drop, 3 Times Daily PRN      clopidogrel 75 MG tablet  Commonly known as: PLAVIX   TAKE ONE TABLET BY MOUTH DAILY      cyclobenzaprine 5 MG tablet  Commonly known as: FLEXERIL   5 mg, Oral, 3 Times Daily PRN      diphenhydrAMINE-zinc acetate 2-0.1 % cream   Topical, 3 Times Daily PRN      docusate sodium 100 MG capsule  Commonly known as: COLACE   100 mg, Oral, 2 Times Daily PRN      escitalopram 20 MG tablet  Commonly known as: LEXAPRO   20 mg, Oral, Daily      ferrous sulfate 325 (65 Fe) MG tablet   TAKE ONE TABLET BY MOUTH DAILY      finasteride 5 MG tablet  Commonly known as: PROSCAR   5 mg, Oral,  Daily      fluocinonide 0.05 % cream  Commonly known as: LIDEX   Topical, 2 Times Daily      furosemide 20 MG tablet  Commonly known as: LASIX   TAKE ONE TABLET BY MOUTH DAILY      HYDROcodone-acetaminophen 5-325 MG per tablet  Commonly known as: NORCO   TAKE 1 TABLET BY MOUTH EVERY 8 HOURS AS NEEDED FOR SEVERE PAIN      hydrocortisone 1 % cream   1 application, Topical, 2 Times Daily      ipratropium 0.06 % nasal spray  Commonly known as: Atrovent   2 sprays, Nasal, 4 Times Daily      ipratropium-albuterol 0.5-2.5 mg/3 ml nebulizer  Commonly known as: DUO-NEB   3 mL, Nebulization, Every 4 Hours PRN      lidocaine 5 %  Commonly known as: LIDODERM   PLACE ONE PATCH ON SKIN AS DIRECTED,REMOVE & DISCARD PATCH WITHIN 12 HOURS      magnesium citrate 1.745 GM/30ML solution solution   15 mL, Oral, Daily PRN      melatonin 5 MG tablet tablet   @@ TAKE 1 TABLET BY MOUTH EVERY EVENING      mirtazapine 15 MG tablet  Commonly known as: REMERON   TAKE 1 TABLET BY MOUTH DAILY AT BEDTIME      O2  Commonly known as: OXYGEN   4 L/min, Inhalation, Once, Walking on 4L and sitting 3L      oxybutynin 5 MG tablet  Commonly known as: DITROPAN   OAKE 1 TABLET BY MOUTH DAILY AT BEDTIME      pantoprazole 40 MG EC tablet  Commonly known as: PROTONIX   TAKE ONE TABLET BY MOUTH DAILY      potassium chloride 10 MEQ CR tablet   TAKE ONE TABLET BY MOUTH DAILY      predniSONE 10 MG tablet  Commonly known as: DELTASONE   TAKE ONE TABLET BY MOUTH DAILY      promethazine 25 MG tablet  Commonly known as: PHENERGAN   TAKE 1 TABLET BY MOUTH EVERY 6 HOURS AS NEEDED FOR NAUSEA AND VOMITING      traZODone 50 MG tablet  Commonly known as: DESYREL   TAKE 1 TABLET BY MOUTH EVERY EVENING      Trelegy Ellipta 100-62.5-25 MCG/INH aerosol powder   Generic drug: Fluticasone-Umeclidin-Vilant   1 puff, Inhalation, Daily         Stop These Medications    hydrocortisone-eucerin     magnesium hydroxide 400 MG/5ML suspension  Commonly known as: MILK OF MAGNESIA      tetracycline 500 MG capsule  Commonly known as: ACHROMYCIN,SUMYCIN            Discharge Diet:     Diet Instructions     Diet: Dysphagia; Thin Liquids, No Restrictions; Mechanical Soft      Discharge Diet: Dysphagia    Fluid Consistency: Thin Liquids, No Restrictions    Pureed Options: Mechanical Soft          Activity at Discharge:     Activity Instructions     Activity as Tolerated            Follow-up Appointments:    Future Appointments   Date Time Provider Department Center   3/29/2021  4:00 PM Janki Lozano APRN [facility] None   4/5/2021 11:15 AM Slim Toribio MD [facility] None   4/6/2021  3:00 PM Miguel Rojas MD [facility]   5/5/2021 10:45 AM Ulysses Bass MD [facility] None     Additional Instructions for the Follow-ups that You Need to Schedule     Discharge Follow-up with PCP   As directed       Currently Documented PCP:    Miguel Rojas MD    PCP Phone Number:    [phone]     Follow Up Details: 1 week         Discharge Follow-up with Specified Provider: Dr Laureano [facility]   As directed      To: Dr Laureano [facility]         Discharge Follow-up with Specified Provider: dr toribio; 2 Weeks   As directed      To: dr toribio    Follow Up: 2 Weeks               Test Results Pending at Discharge:    Pending Labs     Order Current Status    Green Top (No Gel) In process    [place] Draw In process    Blood Culture - Blood, Arm, Left Preliminary result    Blood Culture - Blood, Hand, Right Preliminary result             Joshua Mendez DO  03/20/21  07:45 EDT

## 2021-03-20 NOTE — OUTREACH NOTE
Prep Survey      Responses   [facility] patient discharged from?  [place]   Is LACE score < 7 ?  No   Emergency Room discharge w/ pulse ox?  No   [facility]ibility  [facility]   Date of Admission  03/15/21   Date of Discharge  03/20/21   Discharge Disposition  Home or Self Care   Discharge diagnosis  COPD with exacerbation   Does the patient have one of the following disease processes/diagnoses(primary or secondary)?  COPD/Pneumonia   Does the patient have Home health ordered?  Yes   What is the Home health agency?   [facility]   Is there a DME ordered?  Yes   What DME was ordered?  [employer] - replacement O2 tanks   Prep survey completed?  Yes          Kaylee Brown RN

## 2021-03-20 NOTE — PROGRESS NOTES
Discharge Planning Assessment  [facility]     Patient Name: Jani Pena  MRN: [id number]  Today's Date: 3/20/2021    Admit Date: 3/15/2021    Discharge Needs Assessment    No documentation.       Discharge Plan     Row Name 03/20/21 0933       Plan    Plan  Orders for discharge with HH noted  Spoke with pt and he stated he has used [facility] in the past   Will fax orders to [facility] and CM to follow up on Monday   Called [facility] for transportation  Spoke with Yulia and she stated that they could  patient at 1000    Faxed facesheet, HH referral, PT notes and DC summary to [phone] with success   [facility] staff is bringing his portable O2 and asked we have [facility] 2 replacement tanks   Spoke with Lorrie and they will send replacement portable tanks on Monday   Pt awaiting discharge instructions and transport back to [facility]    Addendum  Faxed HH orders to [facility] with success        Continued Care and Services - Admitted Since 3/15/2021    Coordination has not been started for this encounter.       Expected Discharge Date and Time     Expected Discharge Date Expected Discharge Time    Mar 20, 2021         Demographic Summary    No documentation.       Functional Status    No documentation.       Psychosocial    No documentation.       Abuse/Neglect    No documentation.       Legal    No documentation.       Substance Abuse    No documentation.       Patient Forms    No documentation.           Lissett Johansen RN

## 2021-03-22 NOTE — OUTREACH NOTE
Call Center TCM Note      Responses   [facility] patient discharged from?  [facility]   Does the patient have one of the following disease processes/diagnoses(primary or secondary)?  COPD/Pneumonia   Was the primary reason for admission:  COPD exacerbation   TCM attempt successful?  Yes   Call start time  1741   Call end time  1743   Discharge diagnosis  COPD with exacerbation   Does the patient have all medications ordered at discharge?  Yes   Is the patient taking all medications as directed (includes completed medication regime)?  Yes   Does the patient have a primary care provider?   Yes   Does the patient have an appointment with their PCP or specialist within 7 days of discharge?  Yes   Comments regarding PCP  appt with Dr. Rojas on 4/6/21   Has the patient kept scheduled appointments due by today?  Yes   Comments  states he saw his pulmonologist today   Psychosocial issues?  No   Did the patient receive a copy of their discharge instructions?  Yes   Nursing interventions  Reviewed instructions with patient   What is the patient's perception of their health status since discharge?  Improving   Nursing Interventions  Nurse provided patient education   Is the patient/caregiver able to teach back the hierarchy of who to call/visit for symptoms/problems? PCP, Specialist, Home health nurse, Urgent Care, ED, 911  Yes   Patient reports what zone on this call?  Green Zone   Green Zone  Reports doing well   TCM call completed?  Yes   Wrap up additional comments  Brief call with patient but he states he is doing well, no questions or concerns at this time.          Maria Luisa Ramesh RN    3/22/2021, 17:43 EDT

## 2021-03-23 NOTE — PROGRESS NOTES
Case Management Discharge Note      Final Note: code changed pt declined home health will be using [facility]    Provided Post Acute Provider List?: N/A  N/A Provider List Comment: No needs  Provided Post Acute Provider Quality & Resource List?: N/A  N/A Quality & Resource List Comment: No needs    Selected Continued Care - Discharged on 3/20/2021 Admission date: 3/15/2021 - Discharge disposition: Home or Self Care    Destination    No services have been selected for the patient.              Durable Medical Equipment    No services have been selected for the patient.              Dialysis/Infusion    No services have been selected for the patient.              Home Medical Care    No services have been selected for the patient.              Therapy    No services have been selected for the patient.              Community Resources    No services have been selected for the patient.                       Final Discharge Disposition Code: 01 - home or self-care

## 2021-03-23 NOTE — TELEPHONE ENCOUNTER
Contacted patient and notified him that order for motorized wheelchair was faxed to [facility] 03/10/2021 and they should be in the process of trying to get him this.    I gave patient phone number to contact directly to see where order is in process and assured him that they could contact us if they needed more information; patient expressed understanding and stated they would follow up as needed.

## 2021-03-23 NOTE — TELEPHONE ENCOUNTER
Caller: Jani Pena    Relationship: Self    Best call back number: [phone]    What orders are you requesting : MOTORIZED WHEELCHAIR    Additional notes: PATIENT IS REQUESTING A MOTORIZED WHEELCHAIR. PATIENT IS NOT SURE WHAT MEDICAL SUPPLY STORE TO SEND THE ORDER TO

## 2021-03-30 NOTE — OUTREACH NOTE
COPD/PN Week 2 Survey      Responses   [facility] patient discharged from?  [facility]   Does the patient have one of the following disease processes/diagnoses(primary or secondary)?  COPD/Pneumonia   Was the primary reason for admission:  COPD exacerbation   Week 2 attempt successful?  No   Unsuccessful attempts  Attempt 1          Alee Meza LPN

## 2021-03-31 NOTE — TELEPHONE ENCOUNTER
Spoke with Dr. Rojas directly regarding patient; PCP reviewed note and states patient does not need to hold Plavix or Aspirin since ER stopped bleeding and packed it.     I contacted Jose at [facility] and relayed provider's instructions to continue medication daily as prescribed and to have patient follow up with office if bleeding restarts; caretaker expressed understanding.

## 2021-03-31 NOTE — OUTREACH NOTE
COPD/PN Week 2 Survey      Responses   [facility] patient discharged from?  [facility]   Does the patient have one of the following disease processes/diagnoses(primary or secondary)?  COPD/Pneumonia   Was the primary reason for admission:  COPD exacerbation   Week 2 attempt successful?  Yes   Call start time  0852   Call end time  0902   Discharge diagnosis  COPD with exacerbation   Meds reviewed with patient/caregiver?  Yes   Is the patient having any side effects they believe may be caused by any medication additions or changes?  No   Does the patient have all medications ordered at discharge?  Yes   Is the patient taking all medications as directed (includes completed medication regime)?  Yes   Medication comments  Has finished antibiotics    Comments regarding appointments  Was seen back in ED yesterday for nose bleeding from left nare, It is packed x 3 days   Does the patient have a primary care provider?   Yes   Does the patient have an appointment with their PCP or specialist within 7 days of discharge?  Yes   Has the patient kept scheduled appointments due by today?  Yes   What is the Home health agency?   [facility]   What DME was ordered?  [employer] - replacement O2 tanks   Has all DME been delivered?  Yes   Psychosocial issues?  No   Comments  Melissa reports his breathing is better . Wearing 02. Has nose packed on left side for noosebleed.    Did the patient receive a copy of their discharge instructions?  Yes   Nursing interventions  Reviewed instructions with patient   What is the patient's perception of their health status since discharge?  Improving   Nursing Interventions  Nurse provided patient education   If the patient is a current smoker, are they able to teach back resources for cessation?  Not a smoker   Is the patient/caregiver able to teach back the hierarchy of who to call/visit for symptoms/problems? PCP, Specialist, Home health nurse, Urgent Care, ED, 911  Yes   Is the patient able to  teach back COPD zones?  Yes   Nursing interventions  Education provided on various zones   Patient reports what zone on this call?  Green Zone   Green Zone  Reports doing well, Breathing without shortness of breath   Green Zone interventions:  Take daily medications, Use oxygen as prescribed   Week 2 call completed?  Yes          Sid Mcgregor RN

## 2021-03-31 NOTE — TELEPHONE ENCOUNTER
Patient went last night to the ER for a nose bleed.  He had a nasal rocket placed.  Jose at [facility] needs to know if you want to hold his Plavix and Aspirin and if so for how long.  He also is going to see ENT.  Please advise.  You can reach Jose back at [phone]

## 2021-04-05 NOTE — PROGRESS NOTES
"  Chief Complaint   Patient presents with   • Follow-up   • Shortness of Breath         Subjective   Jani Pena is a 70 y.o. male.     History of Present Illness   Patient was evaluated today for follow up of shortness of breath, hypoxia and COPD.     Patient says that his symptoms have been stable since the last clinic visit. he reports no recent exacerbations.     Patient is using medications, as prescribed. Exercise tolerance has also remained stable.     Uses his BiPAP 3-4 nights per week.     Is on oxygen 24/7.     Quit smoking 4.5-5 years ago.     The following portions of the patient's history were reviewed and updated as appropriate: allergies, current medications, past family history, past medical history, past social history and past surgical history.    Review of Systems   HENT: Positive for postnasal drip. Negative for sinus pressure, sinus pain, sneezing and sore throat.    Respiratory: Positive for cough, chest tightness, shortness of breath and wheezing.        Objective   Visit Vitals  /62   Pulse 105   Ht 172.7 cm (68\")   Wt 77.4 kg (170 lb 9.6 oz)   SpO2 (!) 68% Comment: on RA at rest   BMI 25.94 kg/m²   O2: 98% on 6 lpm    Physical Exam  Vitals reviewed.   Constitutional:       Appearance: He is well-developed.   HENT:      Head: Normocephalic and atraumatic.      Nose:      Comments: Left nostril with \"rhino rocket\" in place.   Eyes:      Extraocular Movements: Extraocular movements intact.   Cardiovascular:      Rate and Rhythm: Normal rate.   Pulmonary:      Comments: Somewhat hyperresonant to percussion.  Somewhat decreased air entry.  No obvious wheezing noted.   Musculoskeletal:      Cervical back: Neck supple.      Comments: Used a wheelchair.    Neurological:      Mental Status: He is alert.         Assessment/Plan   Diagnoses and all orders for this visit:    1. Shortness of breath (Primary)  -     Oxygen Therapy  -     Ambulatory Referral to Pulmonology    2. Chronic " obstructive pulmonary disease, unspecified COPD type (CMS/HCC)  -     Oxygen Therapy  -     Ambulatory Referral to Pulmonology    3. Chronic respiratory failure with hypoxia and hypercapnia (CMS/HCC)    4. Hypoxia  -     Oxygen Therapy    5. Personal history of tobacco use, presenting hazards to health           Return in about 4 months (around 8/5/2021) for Recheck, For Yaquelin, ....Also 10 mths w/ Dr. Toribio.    DISCUSSION (if any):  Last CT scan was reviewed in great detail with the patient. Images reviewed personally.   Results for orders placed during the hospital encounter of 03/15/21    CT Chest Pulmonary Embolism    Narrative  PROCEDURE: CT CHEST PULMONARY EMBOLISM    HISTORY: PE suspected, low/intermediate prob, neg D-dimer    COMPARISON: December 2020.    TECHNIQUE: Multiple axial CT images were obtained from the thoracic  inlet through the upper abdomen following the administration of Isovue  300 per the CT PE protocol. Coronal and oblique 3D MIP images were  reconstructed from the original axial data set.    FINDINGS: There are no filling defects within the pulmonary arteries to  suggest an embolus. The thoracic aorta is normal in caliber with no  evidence of dissection. The heart is enlarged. There are no pleural or  pericardial effusions. There is no adenopathy. There are emphysematous  changes. No focal opacities are identified. There is evidence of prior  granulomatous disease. Bone windows reveal no acute osseous  abnormalities.    Impression  No evidence of pulmonary embolus or dissection.        700.58 mGy.cm      This study was performed with techniques to keep radiation doses as low  as reasonably achievable (ALARA). Individualized dose reduction  techniques using automated exposure control or adjustment of mA and/or  kV according to the patient size were employed.      Latest available PFTs were reviewed.  Consistent with very severe COPD. Performed in Nov 2020.    We have reviewed his pulmonary  medications in great detail.    Any needed adjustments to his pulmonary medications, either for clinical or insurance coverage reasons, have been made and are reflected in the orders.    Compliance with medications stressed.     Will continue Prednisone 10 mg daily for now.     Will consider changing prednisone to every other day, upon follow up visit.     Side effects of prescribed medications discussed with the patient    Very severe COPD. Options are very limited. No need for LDCT.     Since he is likely to have difficulty carrying portable tanks, I believe he would benefit from portable oxygen concentrator as it would provide for better mobility that is definitely needed in this patient with hypoxemia and underlying lung disease.    Will need 3 LPM at rest and 4 LPM with exertion.     Was asked to use BiPAP regularly.    He was also asked to use the BiPAP in the middle of the day for about an hour or 2.     Will refer to [facility] for consideration of lung transplant.       Dictated utilizing Dragon dictation.    This document was electronically signed by Slim Toribio MD on 04/07/21 at 16:49 EDT

## 2021-04-06 NOTE — TELEPHONE ENCOUNTER
ARIADNA FROM [facility] CALLED TO FOLLOW UP ON PATIENT'S APPOINTMENT TODAY REGARDING PATIENT'S POWER WHEELCHAIR.      IS REQUESTING NOTES FROM TODAY'S VISIT DISCUSSING POWER WHEELCHAIR AND TWO PREVIOUS APPOINTMENT NOTES.     PLEASE FAX TO:   ATTN: ARIADNA  FAX TO: [phone]      IF ANY QUESTIONS PLEASE CALL: [phone]

## 2021-04-06 NOTE — TELEPHONE ENCOUNTER
Patient cancelled appointment scheduled for today; attempted to contact Shari to notify and left message

## 2021-04-07 PROBLEM — E66.3 OVERWEIGHT (BMI 25.0-29.9): Status: RESOLVED | Noted: 2021-01-01 | Resolved: 2021-01-01

## 2021-04-07 PROBLEM — A41.9 SEPSIS SECONDARY TO UTI (HCC): Status: RESOLVED | Noted: 2020-01-01 | Resolved: 2021-01-01

## 2021-04-07 PROBLEM — R82.90 ABNORMAL URINALYSIS: Status: RESOLVED | Noted: 2020-01-01 | Resolved: 2021-01-01

## 2021-04-07 PROBLEM — T14.8XXA HEMATOMA: Status: RESOLVED | Noted: 2019-11-08 | Resolved: 2021-01-01

## 2021-04-07 PROBLEM — L85.3 XEROSIS OF SKIN: Status: RESOLVED | Noted: 2020-04-03 | Resolved: 2021-01-01

## 2021-04-07 PROBLEM — N17.9 ACUTE RENAL FAILURE (ARF) (HCC): Status: RESOLVED | Noted: 2019-08-29 | Resolved: 2021-01-01

## 2021-04-07 PROBLEM — H61.22 IMPACTED CERUMEN OF LEFT EAR: Status: ACTIVE | Noted: 2021-01-01
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
## 2021-04-07 PROBLEM — Z79.52 CURRENT CHRONIC USE OF SYSTEMIC STEROIDS: Status: ACTIVE | Noted: 2021-01-01

## 2021-04-07 PROBLEM — N39.0 SEPSIS SECONDARY TO UTI (HCC): Status: RESOLVED | Noted: 2020-01-01 | Resolved: 2021-01-01

## 2021-04-07 PROBLEM — J96.22 ACUTE ON CHRONIC RESPIRATORY FAILURE WITH HYPERCAPNIA (HCC): Status: RESOLVED | Noted: 2021-01-01 | Resolved: 2021-01-01

## 2021-04-07 PROBLEM — J96.00 ACUTE RESPIRATORY FAILURE (HCC): Status: RESOLVED | Noted: 2021-01-01 | Resolved: 2021-01-01

## 2021-04-07 PROBLEM — E55.9 VITAMIN D DEFICIENCY, UNSPECIFIED: Status: ACTIVE | Noted: 2021-01-01

## 2021-04-07 PROBLEM — Z00.00 WELLNESS EXAMINATION: Status: ACTIVE | Noted: 2021-01-01

## 2021-04-07 PROBLEM — L20.82 FLEXURAL ECZEMA: Status: RESOLVED | Noted: 2020-04-03 | Resolved: 2021-01-01

## 2021-04-07 NOTE — PROGRESS NOTES
The ABCs of the Annual Wellness Visit  Subsequent Medicare Wellness Visit    Chief Complaint   Patient presents with   • Medicare Wellness-subsequent   • Shortness of Breath     follow up; needs to discuss motorized wheelchair order       Subjective   History of Present Illness:  Jani Pena is a 70 y.o. male who presents for a Subsequent Medicare Wellness Visit.    Patient here for Medicare wellness also physical.  Heart disease is stable patient is seeing cardiologist.  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  Patient still has ambulating short of breath.  Dysphagia and esophageal dysmotility and a swallow problem.  Blood is still low.  Depression anxiety stable now.  Low back pain stable.    HEALTH RISK ASSESSMENT    Recent Hospitalizations:  No hospitalization(s) within the last year.    Current Medical Providers:  Patient Care Team:  Miguel Rojas MD as PCP - General (Internal Medicine)  Ulysses Bass MD as Cardiologist (Cardiology)    Smoking Status:  Social History     Tobacco Use   Smoking Status Former Smoker   • Packs/day: 3.00   • Years: 30.00   • Pack years: 90.00   • Types: Cigarettes   • Quit date:    • Years since quittin.2   Smokeless Tobacco Never Used       Alcohol Consumption:  Social History     Substance and Sexual Activity   Alcohol Use No       Depression Screen:   PHQ-2/PHQ-9 Depression Screening 2021   Little interest or pleasure in doing things 1   Feeling down, depressed, or hopeless 0   Trouble falling or staying asleep, or sleeping too much -   Feeling tired or having little energy -   Poor appetite or overeating -   Feeling bad about yourself - or that you are a failure or have let yourself or your family down -   Trouble concentrating on things, such as reading the newspaper or watching television -   Moving or speaking so slowly that other people could have noticed. Or the opposite - being so fidgety or restless that you have been moving around a lot more than usual -    Thoughts that you would be better off dead, or of hurting yourself in some way -   Total Score 1   If you checked off any problems, how difficult have these problems made it for you to do your work, take care of things at home, or get along with other people? -       Fall Risk Screen:  CLAUDIA Fall Risk Assessment was completed, and patient is at HIGH risk for falls. Assessment completed on:3/2/2021    Health Habits and Functional and Cognitive Screening:  Functional & Cognitive Status 4/7/2021   Do you have difficulty preparing food and eating? Yes   Do you have difficulty bathing yourself, getting dressed or grooming yourself? Yes   Do you have difficulty using the toilet? Yes   Do you have difficulty moving around from place to place? Yes   Do you have trouble with steps or getting out of a bed or a chair? Yes   Current Diet Well Balanced Diet   Dental Exam Not up to date   Eye Exam Up to date        Eye Exam Comment 2020   Exercise (times per week) 0 times per week   Current Exercise Activities Include None   Do you need help using the phone?  No   Are you deaf or do you have serious difficulty hearing?  No   Do you need help with transportation? Yes   Do you need help shopping? Yes   Do you need help preparing meals?  Yes   Do you need help with housework?  Yes   Do you need help with laundry? Yes   Do you need help taking your medications? Yes   Do you need help managing money? Yes   Do you ever drive or ride in a car without wearing a seat belt? No   Have you felt unusual stress, anger or loneliness in the last month? Yes   Who do you live with? Community   If you need help, do you have trouble finding someone available to you? No   Have you been bothered in the last four weeks by sexual problems? No   Do you have difficulty concentrating, remembering or making decisions? Yes         Does the patient have evidence of cognitive impairment? No    Asprin use counseling:Taking ASA appropriately as  indicated    Age-appropriate Screening Schedule:  Refer to the list below for future screening recommendations based on patient's age, sex and/or medical conditions. Orders for these recommended tests are listed in the plan section. The patient has been provided with a written plan.    Health Maintenance   Topic Date Due   • URINE MICROALBUMIN  Never done   • TDAP/TD VACCINES (1 - Tdap) Never done   • ZOSTER VACCINE (1 of 2) Never done   • DIABETIC EYE EXAM  Never done   • HEMOGLOBIN A1C  02/29/2020   • LIPID PANEL  08/30/2020   • INFLUENZA VACCINE  08/01/2021   • DIABETIC FOOT EXAM  04/07/2022   • COLONOSCOPY  12/28/2029          The following portions of the patient's history were reviewed and updated as appropriate: allergies, current medications, past family history, past medical history, past social history, past surgical history and problem list.    Outpatient Medications Prior to Visit   Medication Sig Dispense Refill   • Acetaminophen Extra Strength 500 MG tablet TAKE 1 TABLET BY MOUTH EVERY 6 HOURS AS NEEDED FOR MILD PAIN 45 tablet 1   • alfuzosin (UROXATRAL) 10 MG 24 hr tablet @@ TAKE ONE TABLET BY MOUTH DAILY 90 tablet 3   • ALPRAZolam (XANAX) 0.25 MG tablet TAKE ONE TABLET BY MOUTH TWO TIMES A DAY AS NEEDED 30 tablet 1   • amLODIPine (NORVASC) 5 MG tablet 1 and 1/2 tab daily po 45 tablet 3   • Aspirin Low Dose 81 MG chewable tablet @@ TAKE ONE TABLET BY MOUTH DAILY 90 tablet 3   • atorvastatin (LIPITOR) 10 MG tablet Take 1 tablet by mouth Every Evening. 90 tablet 3   • benzonatate (TESSALON) 200 MG capsule TAKE ONE CAPSULE BY MOUTH THREE TIMES A DAY AS NEEDED FOR COUGH (Patient taking differently: Take 200 mg by mouth 3 (Three) Times a Day As Needed.) 45 capsule 0   • carboxymethylcellulose (REFRESH PLUS) 0.5 % solution 1 drop 3 (Three) Times a Day As Needed for Dry Eyes.     • clopidogrel (PLAVIX) 75 MG tablet TAKE ONE TABLET BY MOUTH DAILY 90 tablet 3   • cyclobenzaprine (FLEXERIL) 5 MG tablet Take 5  mg by mouth 3 (Three) Times a Day As Needed for Muscle Spasms.     • docusate sodium (COLACE) 100 MG capsule Take 1 capsule by mouth 2 (Two) Times a Day As Needed for Constipation. 100 capsule 3   • famotidine (PEPCID) 40 MG tablet Take 1 tablet by mouth Daily. 30 tablet 0   • ferrous sulfate 325 (65 Fe) MG tablet TAKE ONE TABLET BY MOUTH DAILY 28 tablet 5   • finasteride (PROSCAR) 5 MG tablet Take 5 mg by mouth Daily.     • Fluticasone-Umeclidin-Vilant (Trelegy Ellipta) 100-62.5-25 MCG/INH aerosol powder  Inhale 1 puff Daily. 1 each 3   • furosemide (LASIX) 20 MG tablet TAKE ONE TABLET BY MOUTH DAILY 90 tablet 3   • HYDROcodone-acetaminophen (NORCO) 5-325 MG per tablet TAKE 1 TABLET BY MOUTH EVERY 8 HOURS AS NEEDED FOR SEVERE PAIN 45 tablet 0   • ipratropium (Atrovent) 0.06 % nasal spray 2 sprays into the nostril(s) as directed by provider 4 (Four) Times a Day. 15 mL 1   • ipratropium-albuterol (DUO-NEB) 0.5-2.5 mg/3 ml nebulizer Take 3 mL by nebulization Every 4 (Four) Hours As Needed for Wheezing. 360 mL 6   • lidocaine (LIDODERM) 5 % PLACE ONE PATCH ON SKIN AS DIRECTED,REMOVE & DISCARD PATCH WITHIN 12 HOURS 30 patch 3   • magnesium citrate 1.745 GM/30ML solution solution Take 15 mL by mouth Daily As Needed (constipation).     • melatonin 5 MG tablet tablet @@ TAKE 1 TABLET BY MOUTH EVERY EVENING 90 tablet 3   • O2 (OXYGEN) Inhale 4 L/min 1 (One) Time. Walking on 4L and sitting 3L     • oxybutynin (DITROPAN) 5 MG tablet OAKE 1 TABLET BY MOUTH DAILY AT BEDTIME 90 tablet 3   • pantoprazole (PROTONIX) 40 MG EC tablet TAKE ONE TABLET BY MOUTH DAILY 90 tablet 3   • polyethylene glycol (MIRALAX) 17 g packet Take 17 g by mouth Daily. 30 each 11   • polyethylene glycol (MIRALAX) 17 g packet Take 17 g by mouth 2 (Two) Times a Day. 30 each 0   • potassium chloride 10 MEQ CR tablet TAKE ONE TABLET BY MOUTH DAILY 90 tablet 3   • predniSONE (DELTASONE) 10 MG tablet TAKE ONE TABLET BY MOUTH DAILY 90 tablet 3   • promethazine  (PHENERGAN) 25 MG tablet TAKE 1 TABLET BY MOUTH EVERY 6 HOURS AS NEEDED FOR NAUSEA AND VOMITING 15 tablet 0   • diphenhydrAMINE-zinc acetate 2-0.1 % cream Apply  topically to the appropriate area as directed 3 (Three) Times a Day As Needed for Itching.     • escitalopram (LEXAPRO) 20 MG tablet Take 20 mg by mouth Daily.     • fluocinonide (LIDEX) 0.05 % cream Apply  topically to the appropriate area as directed 2 (Two) Times a Day.     • hydrocortisone 1 % cream Apply 1 application topically to the appropriate area as directed 2 (Two) Times a Day.     • mirtazapine (REMERON) 15 MG tablet TAKE 1 TABLET BY MOUTH DAILY AT BEDTIME 90 tablet 3   • sennosides-docusate (PERICOLACE) 8.6-50 MG per tablet Take 2 tablets by mouth 2 (Two) Times a Day. 60 tablet 0   • traZODone (DESYREL) 50 MG tablet TAKE 1 TABLET BY MOUTH EVERY EVENING 90 tablet 3     No facility-administered medications prior to visit.       Patient Active Problem List   Diagnosis   • Anxiety   • Atrial fibrillation (CMS/HCC)   • Chronic kidney disease   • Steroid-dependent COPD (CMS/HCC)   • Degeneration of cervical intervertebral disc   • Gastroesophageal reflux disease   • Diverticulosis   • Hemorrhoids   • Hiatal hernia   • Hyperlipidemia   • Kyphosis, acquired   • PEDRO on CPAP   • Benign prostatic hyperplasia with incomplete bladder emptying   • Depression   • History of ventricular septal defect   • Chronic diastolic congestive heart failure (CMS/HCC)   • Coronary artery disease involving native coronary artery of native heart with angina pectoris (CMS/HCC)   • Lumbar radiculopathy   • Anemia   • Cardiac pacemaker in situ   • Sick sinus syndrome (CMS/HCC)   • Chronic respiratory failure with hypoxia and hypercapnia (CMS/HCC)   • Esophageal dysphagia   • Presbyesophagus   • Wellness examination   • Vitamin D deficiency, unspecified    • Current chronic use of systemic steroids   • Impacted cerumen of left ear       Advanced Care Planning:  ACP discussion  "was held with the patient during this visit. Patient has an advance directive (not in EMR), copy requested.    Review of Systems   Constitutional: Negative.    HENT: Negative.    Eyes: Negative.    Respiratory: Positive for shortness of breath.    Cardiovascular: Negative.    Gastrointestinal: Negative.         Swallow problem   Endocrine: Negative.    Genitourinary: Negative.    Musculoskeletal: Positive for back pain.   Skin: Negative.    Allergic/Immunologic: Negative.    Neurological: Negative.    Hematological: Negative.    Psychiatric/Behavioral: Negative.    All other systems reviewed and are negative.      Compared to one year ago, the patient feels his physical health is the same.  Compared to one year ago, the patient feels his mental health is the same.    Reviewed chart for potential of high risk medication in the elderly: yes  Reviewed chart for potential of harmful drug interactions in the elderly:no    Objective         Vitals:    04/07/21 1614   BP: 130/84   Pulse: 81   Temp: 97.5 °F (36.4 °C)   SpO2: 91%  Comment: 4 liters   Weight: 77.6 kg (171 lb)   Height: 172.7 cm (68\")       Body mass index is 26 kg/m².  Discussed the patient's BMI with him. The BMI is in the acceptable range  .    Physical Exam  Constitutional:       Appearance: He is well-developed and normal weight.   HENT:      Head: Normocephalic.      Right Ear: Tympanic membrane normal.      Ears:      Comments: Cerumen impaction left side     Mouth/Throat:      Mouth: Mucous membranes are moist.      Pharynx: Oropharynx is clear.   Eyes:      Conjunctiva/sclera: Conjunctivae normal.      Pupils: Pupils are equal, round, and reactive to light.   Cardiovascular:      Rate and Rhythm: Normal rate and regular rhythm.      Pulses: Normal pulses.      Heart sounds: Normal heart sounds.   Pulmonary:      Effort: Pulmonary effort is normal.      Comments: End stage copd  Abdominal:      General: Bowel sounds are normal.      Palpations: Abdomen " is soft.   Genitourinary:     Comments: Patient declined  Musculoskeletal:         General: Tenderness present. Normal range of motion.      Cervical back: Normal range of motion and neck supple.   Skin:     General: Skin is warm.   Neurological:      Mental Status: He is alert and oriented to person, place, and time. Mental status is at baseline.   Psychiatric:         Mood and Affect: Mood normal.         Behavior: Behavior normal.         Thought Content: Thought content normal.         Judgment: Judgment normal.         Lab Results   Component Value Date     (H) 02/12/2021        Assessment/Plan   Medicare Risks and Personalized Health Plan  CMS Preventative Services Quick Reference  Advance Directive Discussion    The above risks/problems have been discussed with the patient.  Pertinent information has been shared with the patient in the After Visit Summary.  Follow up plans and orders are seen below in the Assessment/Plan Section.    Diagnoses and all orders for this visit:    1. Sick sinus syndrome (CMS/[facility]) (Primary) follow-up with cardiologist    2. Mixed hyperlipidemia continue medication    3. History of ventricular septal defect cardiology    4. Coronary artery disease involving native coronary artery of native heart with angina pectoris (CMS/[facility]) cardiology    5. Chronic diastolic congestive heart failure (CMS/[facility])  Cardiology follow-up  6. Cardiac pacemaker in situ cardiology follow-up    7. Paroxysmal atrial fibrillation (CMS/[facility]) cardiology follow-up    8. Presbyesophagus dysphagia decreased motility follow-up with GI    9. Hiatal hernia    10. Hemorrhoids, unspecified hemorrhoid type    11. Gastroesophageal reflux disease without esophagitis continue medication    13. Diverticulosis    14. Chronic kidney disease, unspecified CKD stage check lab    15. Benign prostatic hyperplasia with incomplete bladder emptying check lab    16. Anemia, unspecified type check lab    17. Depression,  unspecified depression type continue medication    18. Anxiety  continue medication  19. Kyphosis, acquired    20. Lumbar radiculopathy continue medication    21. Degeneration of cervical intervertebral disc continue medication as needed    22. Steroid-dependent COPD (CMS/HCC) check a DEXA  -     DEXA Bone Density Axial    23. Chronic respiratory failure with hypoxia and hypercapnia (CMS/HCC) follow-up with pulmonology continue medications    25. PEDRO on CPAP    26. Wellness examination  -     CBC & Differential  -     Comprehensive Metabolic Panel  -     Lipid Panel  -     TSH  -     PSA DIAGNOSTIC  -     Hemoglobin A1c  -     Hepatitis C Antibody  -     Vitamin D 25 Hydroxy  -     CK  -     MicroAlbumin, Urine, Random - Urine, Clean Catch    27. Vitamin D deficiency, unspecified   -     Vitamin D 25 Hydroxy    28. Current chronic use of systemic steroids  -     DEXA Bone Density Axial    29. Impacted cerumen of left ear clinic today no complication    Other orders  -     Ear Cerumen Removal      End-stage of COPD:   Patient has a mobility limitation that significantly impairs his ability to participate in one or more related activity of daily living such as:: Walking with short distance and handling with his ADL     A cane or walker have been ruled out     The use of a wheelchair will significantly improve patient's ability to participate in ADL's and with patient will use it on a regular basis within the home     Follow Up:  Return in about 3 months (around 7/7/2021) for Recheck.     An After Visit Summary and PPPS were given to the patient.    Ear Cerumen Removal    Date/Time: 4/7/2021 5:02 PM  Performed by: Miguel Rojas MD  Authorized by: Miguel Rojas MD     Anesthesia:  Local Anesthetic: none  Location details: left ear  Patient tolerance: patient tolerated the procedure well with no immediate complications  Procedure type: irrigation   Sedation:  Patient sedated: no             ----Below is to historical  records for reference only:   Chronic diastolic congestive heart failure (CMS/HCC) stable now  Coronary artery disease cath negative  SSS, s/p Cardiac pacemaker in situ follow cardio  Steroid-dependent COPD (CMS/HCC),  continue present mx recent exacerbation, improved after medication.   Esophageal dysphagia, s/p dilation, still dysphagia. GERD without esophagitis, UGI NSD,  follow up with GI,   osteopenia, oscal D 500mg bid continue  left low back pain resolved follow up with ortho s/p left hip surgery for severe OA doing well.   divertic hemorroid on colon 1/2013  DM stable without med  phimosis seen by uro declined surgery  Hematuria followup with urologist s/p cystoscope bladder stones  BPH enlarged on CT scan follow up with urologist.  Anemia workup all negative.? anemia of chronic disease due to chronic disease   History of the CAD atrial fibrillation VSD status post occlusion stable now. Status post a defibrillator seen by cardio follow up with cardio,   History of a TIA patient is on Plavix continue  pneumovax done, prevnar 13 1/19/17, Td to HD.flushot done, shingrix informed  Depression anxiety cont lexapro to 20 daily and cont xanax prn  right inquinal hernia decline surgery now, call if worse  BLE edema mild, watch for now, cut down salt, avoid soda  Gall stone without sx   CKD follow up with renal

## 2021-04-07 NOTE — OUTREACH NOTE
COPD/PN Week 3 Survey      Responses   [facility] patient discharged from?  [facility]   Does the patient have one of the following disease processes/diagnoses(primary or secondary)?  COPD/Pneumonia   Was the primary reason for admission:  COPD exacerbation   Week 3 attempt successful?  No   Unsuccessful attempts  Attempt 1          Tomasa Church RN

## 2021-04-08 NOTE — OUTREACH NOTE
COPD/PN Week 3 Survey      Responses   [facility] patient discharged from?  [facility]   Does the patient have one of the following disease processes/diagnoses(primary or secondary)?  COPD/Pneumonia   Was the primary reason for admission:  COPD exacerbation   Week 3 attempt successful?  Yes   Call start time  1538   Call end time  1555   Discharge diagnosis  COPD with exacerbation   Meds reviewed with patient/caregiver?  Yes   Is the patient having any side effects they believe may be caused by any medication additions or changes?  No   Does the patient have all medications ordered at discharge?  Yes   Is the patient taking all medications as directed (includes completed medication regime)?  Yes   Does the patient have a primary care provider?   Yes   Does the patient have an appointment with their PCP or specialist within 7 days of discharge?  Yes   Has the patient kept scheduled appointments due by today?  Yes   What is the Home health agency?   [facility] that has HH visits   Has home health visited the patient within 72 hours of discharge?  Yes   Pulse Ox monitoring  Intermittent   Pulse Ox device source  Patient   O2 Sat comments  pt states baseline is 88-92% on O2, drops with exertion to 60's-80's, can get to 90's with rest, discussed with pulmonologist 4/6/21   O2 Sat: education provided  Sat levels, Monitoring frequency, When to seek care   Psychosocial issues?  No   Comments  pt states has lung capacity of 22%, had discussion with pulmonologist 4/6/21 about possible lung transplant, within 2 years   Did the patient receive a copy of their discharge instructions?  Yes   Nursing interventions  Reviewed instructions with patient   What is the patient's perception of their health status since discharge?  Improving   Nursing Interventions  Nurse provided patient education   Is the patient/caregiver able to teach back the hierarchy of who to call/visit for symptoms/problems? PCP, Specialist,  Home health nurse, Urgent Care, ED, 911  Yes   Additional teach back comments  pt states understanding of limits caused by 22% O2 capacity   Is the patient able to teach back COPD zones?  Yes   Nursing interventions  Education provided on various zones   Patient reports what zone on this call?  Green Zone   Green Zone  Reports doing well, Breathing without shortness of breath, Usual activity and exercise level, Usual amount of phlegm/mucus without difficulty coughing up, Sleeping well, Appetite is good [pt at baseline]   Green Zone interventions:  Take daily medications, Use oxygen as prescribed, Continue regular exercise/diet plan, Avoid indoor/outdoor triggers   Week 3 call completed?  Yes          Stephanie Villegas RN

## 2021-04-13 NOTE — PROGRESS NOTES
Subjective   Jani Pena is a 70 y.o. male.     Chief Complaint   Patient presents with   • Cough     3 days; productive; fear or bronchitis/pneumonia       History of Present Illness   3 day cough yellow sputum night and am wheeze, and soa worse, no fever or chills. No sinus congestion otc med no help    Current Outpatient Medications:   •  Acetaminophen Extra Strength 500 MG tablet, TAKE 1 TABLET BY MOUTH EVERY 6 HOURS AS NEEDED FOR MILD PAIN, Disp: 45 tablet, Rfl: 1  •  alfuzosin (UROXATRAL) 10 MG 24 hr tablet, @@ TAKE ONE TABLET BY MOUTH DAILY, Disp: 90 tablet, Rfl: 3  •  ALPRAZolam (XANAX) 0.25 MG tablet, TAKE ONE TABLET BY MOUTH TWO TIMES A DAY AS NEEDED, Disp: 30 tablet, Rfl: 1  •  amLODIPine (NORVASC) 5 MG tablet, 1 and 1/2 tab daily po, Disp: 45 tablet, Rfl: 3  •  Aspirin Low Dose 81 MG chewable tablet, @@ TAKE ONE TABLET BY MOUTH DAILY, Disp: 90 tablet, Rfl: 3  •  atorvastatin (LIPITOR) 10 MG tablet, TAKE 1 TABLET BY MOUTH EVERY EVENING, Disp: 90 tablet, Rfl: 3  •  benzonatate (TESSALON) 200 MG capsule, TAKE ONE CAPSULE BY MOUTH THREE TIMES A DAY AS NEEDED FOR COUGH (Patient taking differently: Take 200 mg by mouth 3 (Three) Times a Day As Needed.), Disp: 45 capsule, Rfl: 0  •  carboxymethylcellulose (REFRESH PLUS) 0.5 % solution, 1 drop 3 (Three) Times a Day As Needed for Dry Eyes., Disp: , Rfl:   •  clopidogrel (PLAVIX) 75 MG tablet, TAKE ONE TABLET BY MOUTH DAILY, Disp: 90 tablet, Rfl: 3  •  cyclobenzaprine (FLEXERIL) 5 MG tablet, Take 5 mg by mouth 3 (Three) Times a Day As Needed for Muscle Spasms., Disp: , Rfl:   •  docusate sodium (COLACE) 100 MG capsule, Take 1 capsule by mouth 2 (Two) Times a Day As Needed for Constipation., Disp: 100 capsule, Rfl: 3  •  escitalopram (LEXAPRO) 20 MG tablet, @@ TAKE ONE TABLET BY MOUTH DAILY, Disp: 90 tablet, Rfl: 3  •  famotidine (PEPCID) 40 MG tablet, Take 1 tablet by mouth Daily., Disp: 30 tablet, Rfl: 0  •  ferrous sulfate 325 (65 Fe) MG tablet, TAKE ONE  TABLET BY MOUTH DAILY, Disp: 28 tablet, Rfl: 5  •  finasteride (PROSCAR) 5 MG tablet, TAKE ONE TABLET BY MOUTH DAILY, Disp: 90 tablet, Rfl: 3  •  Fluticasone-Umeclidin-Vilant (Trelegy Ellipta) 100-62.5-25 MCG/INH aerosol powder , Inhale 1 puff Daily., Disp: 1 each, Rfl: 3  •  furosemide (LASIX) 20 MG tablet, TAKE ONE TABLET BY MOUTH DAILY, Disp: 90 tablet, Rfl: 3  •  HYDROcodone-acetaminophen (NORCO) 5-325 MG per tablet, TAKE 1 TABLET BY MOUTH EVERY 8 HOURS AS NEEDED FOR SEVERE PAIN, Disp: 45 tablet, Rfl: 0  •  ipratropium (Atrovent) 0.06 % nasal spray, 2 sprays into the nostril(s) as directed by provider 4 (Four) Times a Day., Disp: 15 mL, Rfl: 1  •  ipratropium-albuterol (DUO-NEB) 0.5-2.5 mg/3 ml nebulizer, Take 3 mL by nebulization Every 4 (Four) Hours As Needed for Wheezing., Disp: 360 mL, Rfl: 6  •  lidocaine (LIDODERM) 5 %, PLACE ONE PATCH ON SKIN AS DIRECTED,REMOVE & DISCARD PATCH WITHIN 12 HOURS, Disp: 30 patch, Rfl: 3  •  melatonin 5 MG tablet tablet, @@ TAKE 1 TABLET BY MOUTH EVERY EVENING, Disp: 90 tablet, Rfl: 3  •  mirtazapine (REMERON) 15 MG tablet, TAKE 1 TABLET BY MOUTH DAILY AT BEDTIME, Disp: 90 tablet, Rfl: 3  •  O2 (OXYGEN), Inhale 4 L/min 1 (One) Time. Walking on 4L and sitting 3L, Disp: , Rfl:   •  oxybutynin (DITROPAN) 5 MG tablet, TAKE 1 TABLET BY MOUTH DAILY AT BEDTIME, Disp: 90 tablet, Rfl: 3  •  pantoprazole (PROTONIX) 40 MG EC tablet, TAKE ONE TABLET BY MOUTH DAILY, Disp: 90 tablet, Rfl: 3  •  polyethylene glycol (MIRALAX) 17 g packet, Take 17 g by mouth Daily., Disp: 30 each, Rfl: 11  •  potassium chloride 10 MEQ CR tablet, TAKE ONE TABLET BY MOUTH DAILY, Disp: 90 tablet, Rfl: 3  •  promethazine (PHENERGAN) 25 MG tablet, TAKE 1 TABLET BY MOUTH EVERY 6 HOURS AS NEEDED FOR NAUSEA AND VOMITING, Disp: 15 tablet, Rfl: 0  •  traZODone (DESYREL) 50 MG tablet, TAKE 1 TABLET BY MOUTH EVERY EVENING, Disp: 90 tablet, Rfl: 3  •  diphenhydrAMINE-zinc acetate 2-0.1 % cream, Apply  topically to the  appropriate area as directed 3 (Three) Times a Day As Needed for Itching., Disp: , Rfl:   •  doxycycline (VIBRAMYCIN) 100 MG capsule, Take 1 capsule by mouth 2 (Two) Times a Day., Disp: 28 capsule, Rfl: 0  •  fluocinonide (LIDEX) 0.05 % cream, Apply  topically to the appropriate area as directed 2 (Two) Times a Day., Disp: , Rfl:   •  hydrocortisone 1 % cream, Apply 1 application topically to the appropriate area as directed 2 (Two) Times a Day., Disp: , Rfl:   •  magnesium citrate 1.745 GM/30ML solution solution, Take 15 mL by mouth Daily As Needed (constipation)., Disp: , Rfl:   •  polyethylene glycol (MIRALAX) 17 g packet, Take 17 g by mouth 2 (Two) Times a Day., Disp: 30 each, Rfl: 0  •  predniSONE (DELTASONE) 10 MG tablet, TAKE ONE TABLET BY MOUTH DAILY, Disp: 90 tablet, Rfl: 3  •  sennosides-docusate (PERICOLACE) 8.6-50 MG per tablet, Take 2 tablets by mouth 2 (Two) Times a Day., Disp: 60 tablet, Rfl: 0    The following portions of the patient's history were reviewed and updated as appropriate: allergies, current medications, past family history, past medical history, past social history, past surgical history and problem list.    Review of Systems   Constitutional: Negative.  Negative for fever.   Respiratory: Positive for cough, shortness of breath and wheezing. Negative for chest tightness.    Cardiovascular: Negative.    Gastrointestinal: Negative.    Skin: Negative.    Psychiatric/Behavioral: Negative.        Objective   Physical Exam  Neurological:      Mental Status: He is alert.         All tests have been reviewed.    Assessment/Plan   Diagnoses and all orders for this visit:    Bronchitis  -     doxycycline (VIBRAMYCIN) 100 MG capsule; Take 1 capsule by mouth 2 (Two) Times a Day.    Tessalon Perle and nebulizer.  Patient to call if no better    12'     You have chosen to receive care through a telephone visit. Do you consent to use a telephone visit for your medical care today? Yes    Individuals  involved in this encounter:  GI Reddy Dr.

## 2021-04-13 NOTE — TELEPHONE ENCOUNTER
ARIADNA FROM [facility] IS REQUESTING A CALL BACK BECAUSE SHE WAS FAXED PAPERWORK THIS MORNING BUT THE PCP'S SIGNATURE IS MISSING. SHE IS NEEDING A CALLBACK TO DISCUSS WHAT IS NEEDED.     CONTACT: [phone]

## 2021-04-13 NOTE — TELEPHONE ENCOUNTER
Contacted Shari and got clarification on what was needed; I will have Dr. Rojas sign missing pages of order form and refax to [facility]

## 2021-04-15 NOTE — TELEPHONE ENCOUNTER
PT CALLED STATED THAT PAPER WORK FOR MOT[employer] WHEEL [employer] IS MISSING SIGNATURE FROM  AND REQUEST TO HAVE IT SIGN AND FAXED BACK OVER.    PLEASE ADVISE.  CALL BACK:[phone]

## 2021-04-15 NOTE — TELEPHONE ENCOUNTER
Contacted patient and notified that Dr. Rojas has signed form that was missing a signature and it will be faxed.

## 2021-04-19 NOTE — OUTREACH NOTE
COPD/PN Week 4 Survey      Responses   [facility] patient discharged from?  [facility]   Does the patient have one of the following disease processes/diagnoses(primary or secondary)?  COPD/Pneumonia   Was the primary reason for admission:  COPD exacerbation   Week 4 attempt successful?  No          Corrina Ontiveros RN

## 2021-04-26 NOTE — TELEPHONE ENCOUNTER
Please advise. Do you want me to PA his Lidocaine patches or do you want to prescribe different medication? I contacted pharmacy and they stated that there weren't any covered alternatives.

## 2021-04-26 NOTE — TELEPHONE ENCOUNTER
Pharmacy Name:  [facility]    Pharmacy representative name: KARMEN    Pharmacy representative phone number: [phone]  OPTION 4    What medication are you calling in regards to: 5% LIDOCAINE PATCHES    What question does the pharmacy have: NEED PRIOR AUTHORIZATION     Who is the provider that prescribed the medication: DR. SAJAN CHOUDHARY    Additional notes:

## 2021-05-05 NOTE — PROGRESS NOTES
[facility]  Office Progress Note  Jani Pena  1950  [address]  [place] [place] [address]       Visit Date: 05/05/21    PCP: Miguel Rojas MD  [address]  [place] [place] [address]    IDENTIFICATION: A 70 y.o. male  resident of [place], [place].    PROBLEM LIST:   1. CAD  1. 1994, 2008- [facility] nonobstr  2. 2011 SE wnl  3. 5/18 [facility] nonobst cad Dr Robertson  2. Abnl Echo  1. 2013 EF 45 w BBB  2. 1/3/18 echo: EF 45-50%, moderate LA enlargement, mitral thickening with mild MR, sclerosed aortic valve with mild AI, mild TR, apical segmental hypokinesis  3. CVD  1. B CEA -[facility] remote  2. CUS [facility] 2014 nonobst  4. HTN  5. Remote renal artery stent  6. HL  1. 6/14 137/149/42/65  7. SSS  1. 1/31/19 [facility]  8. CKD 3-2.4 2020  9. PEDRO  10. COPD  11. PFO closure Dr Alves       Chief Complaint   Patient presents with   • Coronary Artery Disease       Allergies  Allergies   Allergen Reactions   • Levaquin [Levofloxacin] Hives   • Sulfa Antibiotics Nausea And Vomiting       Current Medications    Current Outpatient Medications:   •  Acetaminophen Extra Strength 500 MG tablet, TAKE 1 TABLET BY MOUTH EVERY 6 HOURS AS NEEDED FOR MILD PAIN, Disp: 180 tablet, Rfl: 3  •  alfuzosin (UROXATRAL) 10 MG 24 hr tablet, @@ TAKE ONE TABLET BY MOUTH DAILY, Disp: 90 tablet, Rfl: 3  •  ALPRAZolam (XANAX) 0.25 MG tablet, TAKE ONE TABLET BY MOUTH TWO TIMES A DAY AS NEEDED, Disp: 30 tablet, Rfl: 1  •  amLODIPine (NORVASC) 5 MG tablet, 1 and 1/2 tab daily po, Disp: 45 tablet, Rfl: 3  •  Aspirin Low Dose 81 MG chewable tablet, @@ TAKE ONE TABLET BY MOUTH DAILY, Disp: 90 tablet, Rfl: 3  •  atorvastatin (LIPITOR) 10 MG tablet, TAKE 1 TABLET BY MOUTH EVERY EVENING, Disp: 90 tablet, Rfl: 3  •  benzonatate (TESSALON) 200 MG capsule, Take 1 capsule by mouth 3 (Three) Times a Day As Needed for Cough., Disp: 45 capsule, Rfl: 0  •  carboxymethylcellulose (REFRESH PLUS) 0.5 % solution, 1 drop 3  (Three) Times a Day As Needed for Dry Eyes., Disp: , Rfl:   •  clopidogrel (PLAVIX) 75 MG tablet, TAKE ONE TABLET BY MOUTH DAILY, Disp: 90 tablet, Rfl: 3  •  cyclobenzaprine (FLEXERIL) 5 MG tablet, Take 5 mg by mouth 3 (Three) Times a Day As Needed for Muscle Spasms., Disp: , Rfl:   •  diphenhydrAMINE-zinc acetate 2-0.1 % cream, Apply  topically to the appropriate area as directed 3 (Three) Times a Day As Needed for Itching., Disp: , Rfl:   •  docusate sodium (COLACE) 100 MG capsule, Take 1 capsule by mouth 2 (Two) Times a Day As Needed for Constipation., Disp: 100 capsule, Rfl: 3  •  doxycycline (VIBRAMYCIN) 100 MG capsule, Take 1 capsule by mouth 2 (Two) Times a Day., Disp: 28 capsule, Rfl: 0  •  escitalopram (LEXAPRO) 20 MG tablet, @@ TAKE ONE TABLET BY MOUTH DAILY, Disp: 90 tablet, Rfl: 3  •  famotidine (PEPCID) 40 MG tablet, TAKE ONE TABLET BY MOUTH EVERY DAY, Disp: 90 tablet, Rfl: 3  •  ferrous sulfate 325 (65 Fe) MG tablet, TAKE ONE TABLET BY MOUTH DAILY, Disp: 28 tablet, Rfl: 5  •  finasteride (PROSCAR) 5 MG tablet, TAKE ONE TABLET BY MOUTH DAILY, Disp: 90 tablet, Rfl: 3  •  fluocinonide (LIDEX) 0.05 % cream, Apply  topically to the appropriate area as directed 2 (Two) Times a Day., Disp: , Rfl:   •  Fluticasone-Umeclidin-Vilant (Trelegy Ellipta) 100-62.5-25 MCG/INH aerosol powder , Inhale 1 puff Daily., Disp: 1 each, Rfl: 3  •  furosemide (LASIX) 20 MG tablet, TAKE ONE TABLET BY MOUTH DAILY, Disp: 90 tablet, Rfl: 3  •  HYDROcodone-acetaminophen (NORCO) 5-325 MG per tablet, TAKE 1 TABLET BY MOUTH EVERY 8 HOURS AS NEEDED FOR SEVERE PAIN, Disp: 45 tablet, Rfl: 0  •  hydrocortisone 1 % cream, Apply 1 application topically to the appropriate area as directed 2 (Two) Times a Day., Disp: , Rfl:   •  ipratropium (Atrovent) 0.06 % nasal spray, 2 sprays into the nostril(s) as directed by provider 4 (Four) Times a Day., Disp: 15 mL, Rfl: 1  •  ipratropium-albuterol (DUO-NEB) 0.5-2.5 mg/3 ml nebulizer, Take 3 mL by  nebulization Every 4 (Four) Hours As Needed for Wheezing., Disp: 360 mL, Rfl: 6  •  lidocaine (LIDODERM) 5 %, Place 1 patch on the skin as directed by provider Daily. Remove & Discard patch within 12 hours or as directed by MD, Disp: 30 patch, Rfl: 3  •  magnesium citrate 1.745 GM/30ML solution solution, Take 15 mL by mouth Daily As Needed (constipation)., Disp: , Rfl:   •  melatonin 5 MG tablet tablet, @@ TAKE 1 TABLET BY MOUTH EVERY EVENING, Disp: 90 tablet, Rfl: 3  •  mirtazapine (REMERON) 15 MG tablet, TAKE 1 TABLET BY MOUTH DAILY AT BEDTIME, Disp: 90 tablet, Rfl: 3  •  O2 (OXYGEN), Inhale 4 L/min 1 (One) Time. Walking on 4L and sitting 3L, Disp: , Rfl:   •  oxybutynin (DITROPAN) 5 MG tablet, TAKE 1 TABLET BY MOUTH DAILY AT BEDTIME, Disp: 90 tablet, Rfl: 3  •  pantoprazole (PROTONIX) 40 MG EC tablet, TAKE ONE TABLET BY MOUTH DAILY, Disp: 90 tablet, Rfl: 3  •  polyethylene glycol (MIRALAX) 17 g packet, Take 17 g by mouth Daily., Disp: 30 each, Rfl: 11  •  polyethylene glycol (MIRALAX) 17 g packet, Take 17 g by mouth 2 (Two) Times a Day., Disp: 30 each, Rfl: 0  •  potassium chloride 10 MEQ CR tablet, TAKE ONE TABLET BY MOUTH DAILY, Disp: 90 tablet, Rfl: 3  •  predniSONE (DELTASONE) 10 MG tablet, TAKE ONE TABLET BY MOUTH DAILY, Disp: 90 tablet, Rfl: 3  •  promethazine (PHENERGAN) 25 MG tablet, TAKE 1 TABLET BY MOUTH EVERY 6 HOURS AS NEEDED FOR NAUSEA AND VOMITING, Disp: 15 tablet, Rfl: 0  •  Stimulant Laxative 8.6-50 MG per tablet, @@TAKE TWO TABLETS BY MOUTH TWO TIMES A DAY, Disp: 360 tablet, Rfl: 3  •  traZODone (DESYREL) 50 MG tablet, TAKE 1 TABLET BY MOUTH EVERY EVENING, Disp: 90 tablet, Rfl: 3      History of Present Illness   Jani Pena is a 70 y.o. year old male here for follow up.  Baseline end-stage COPD he is remained out of the hospital since October of this past year he remains in assisted living  He has been referred to [facility] for evaluation potential lung transplant    .      OBJECTIVE:  Vitals:  "   05/05/21 1044   BP: 128/66   BP Location: Right arm   Patient Position: Sitting   Pulse: 92   SpO2: 90%   Weight: 77.1 kg (170 lb)   Height: 172.7 cm (68\")     Physical Exam  Constitutional:       Appearance: He is well-developed.      Comments: Chronic O2   Neck:      Thyroid: No thyromegaly.      Vascular: No carotid bruit, hepatojugular reflux or JVD.      Trachea: No tracheal deviation.   Cardiovascular:      Rate and Rhythm: Normal rate and regular rhythm.      Chest Wall: PMI is not displaced.      Pulses: Normal pulses and intact distal pulses. No midsystolic click and no opening snap.           Radial pulses are 2+ on the right side and 2+ on the left side.        Dorsalis pedis pulses are 2+ on the right side and 2+ on the left side.        Posterior tibial pulses are 2+ on the right side and 2+ on the left side.      Heart sounds: S1 normal and S2 normal. Heart sounds not distant. No murmur heard.   No friction rub. No gallop.    Pulmonary:      Effort: Pulmonary effort is normal.      Breath sounds: Normal breath sounds. No wheezing or rales.   Abdominal:      General: Bowel sounds are normal.      Palpations: Abdomen is soft. There is no mass.      Tenderness: There is no abdominal tenderness. There is no guarding.   Musculoskeletal:      Cervical back: Normal range of motion and neck supple.         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 Coronary artery disease involving native coronary artery of native heart without angina pectoris     2. AV block, Mobitz II     3. Essential hypertension     4. Mixed hyperlipidemia     5. Panlobular emphysema (CMS/HCC)         PLAN:  CAD historically nonobstructive continue medical management    AV block post pacemaker acceptable function continue monitoring    Hypertension acceptable control " lisinopril    Dyslipidemia on statin therapy    End-stage COPD oxygen dependent counseled regarding avoidance of infectious environments    High but not prohibitive risk for lung    Miguel Rojas MD, thank you for referring Mr. Pena for evaluation.  I have forwarded my electronically generated recommendations to you for review.  Please do not hesitate to call with any questions.      Ulysses Bass MD, FACC

## 2021-05-12 NOTE — TELEPHONE ENCOUNTER
Patient has requested prior auth on Lidocaine patches and provider agreed; PA started on CoverMyMeds    Key: [id number]  Last name: Pena  MIKE 1950

## 2021-05-25 NOTE — PROGRESS NOTES
Follow Up Note     Date: 2021   Patient Name: Jani Pena  MRN: [id number]  : 1950     Primary Care Provider: Miguel Rojas MD     Chief Complaint   Patient presents with   • Follow-up     History of present illness:   2021  Jani Pena is a 71 y.o. male who is here today for follow up for difficulty swallowing.     Difficulty swallowing is unchanged. He had gone to have esophageal manometry test, but he did not know to hold his Plavix and he was unable to have it done. He would like to have it rescheduled. No history of weight loss. He has a long standing history of anemia, denies GI bleeding.  He will be going to  next month for evaluation for lung transplant as his COPD is getting worse.    Interval History:  3/19/2021  He has been eating ok per his report since yesterday. He still has dysphagia with any solid foods. Sometimes still has regurgitation. He is still having difficulty breathing, improved since admission. Still no BM per his report, drank Miralax 2 doses yesterday. He prefers not to have a suppository because it is difficult for him to get out bed to go to bathroom quickly. He is not drinking any caffeine, stopped coffee years ago, drinks soda rarely.      3/18/2021   Mr. Pena is a 70-year-old male with past medical history of COPD with recent exacerbation, CHF, atrial fibrillation on Plavix and aspirin and known esophageal dysmotility.  He presented to the ED on 3/15/2021 due to coughing and hemoptysis.  He was admitted to the hospital, placed on BiPAP and antibiotics and admitted to the hospital for further management.     He has had difficulty swallowing for several years now.  He has had several EGDs in the past with dilatations of the esophagus without significant improvement.  He would notice temporary improvement in dysphagia for approximately 2 to 3 months after dilatation.  He has been managing this condition over the past few months with avoiding  "breads and meats.  He is careful with eating and tries to eat slowly.  He had great difficulty this morning while eating scrambled eggs and had regurgitation.  He feels that the food will get stuck and points to his neck or mid chest area.  He denies any current acid reflux or heartburn symptoms.  He does take Protonix 40 mg once daily.  His last EGD was completed by Dr. Madrid on 10/20/2020 and showed 1 cm hiatal hernia, subtle age-related concentric rings and tertiary contractions, dilatation was performed.  He was scheduled in [place] to have esophageal manometry testing but rescheduled at first due to weather and had it rescheduled to 3/16, planned to complete this procedure before he was admitted to the hospital this week.     He denies any current abdominal pain, nausea or overt vomiting.  Denies any current rectal bleeding or melena.  He has not noticed any weight loss.  He does have some constipation which has become worse since being hospitalized.  He has not had a bowel movement in the past 5 to 6 days.  He usually takes MiraLAX as needed at home and he received his first dose of MiraLAX yesterday without any bowel movement since.     The patient had EGD in January 2019 by Dr. Oneil with gastroparesis and presbyesophagus noted. He had another EGD in [place] in October 2019 and had esophagus \"stretched\" (we do not have those records). The patient had EGD in December 2019 by Dr. Moe that was  \"normal\". The patient had colonoscopy in December 2019 by Dr. Moe with 1 benign mucosal tag removed, otherwise unremarkable.    Subjective      Past Medical History:   Diagnosis Date   • Abnormal liver enzymes    • Anemia    • Anxiety    • Arthritis    • Atherosclerotic heart disease of native coronary artery without angina pectoris    • Atrial fibrillation (CMS/HCC)    • Back pain    • BPH (benign prostatic hyperplasia)    • Cataract, bilateral    • Chest pain     2-3 MONTHS AGO.  PER PT, HAS ADDRESSED WITH " CARDIOLOGIST.  STATES HAS NTG PRN.   • CHF (congestive heart failure) (CMS/HCC)    • Chronic bronchitis (CMS/HCC)    • Chronic kidney disease    • COPD (chronic obstructive pulmonary disease) (CMS/HCC)    • Degeneration of cervical intervertebral disc    • Depression    • Diverticulosis    • Dyspnea    • Esophageal reflux    • Esophagitis    • Gastritis    • Hematuria    • Hemorrhoids    • Hiatal hernia    • History of arthritis    • History of nuclear stress test     UNSURE OF DATE   • History of peripheral neuropathy    • History of ventricular septal defect    • History of ventricular septal defect    • Hyperlipidemia    • Hypertension    • Impaired functional mobility, balance, gait, and endurance    • Infectious diarrhea    • Kyphosis, acquired    • Lumbar radiculopathy    • Mitral and aortic valve disease    • Nephrolithiasis    • Phimosis    • Problems with swallowing     WITH FOOD   • Respiratory failure (CMS/HCC)    • Sleep apnea     BIPAP, too awkward to bring   • TIA (transient ischemic attack)     X3.   2014   • Ventral hernia    • Wears glasses     FOR READING     Past Surgical History:   Procedure Laterality Date   • CARDIAC CATHETERIZATION     • CARDIAC CATHETERIZATION N/A 5/24/2018    Procedure: Left Heart Cath;  Surgeon: Edouard Robertson MD;  Location:  [facility];  Service: Cardiovascular   • CARDIAC ELECTROPHYSIOLOGY PROCEDURE N/A 1/31/2019    Procedure: PACEMAKER IMPLANTATION- DC;  Surgeon: Omar Encinas DO;  Location:  [facility];  Service: Cardiology   • COLONOSCOPY N/A 12/28/2019    Procedure: COLONOSCOPY WITH HOT POLYPECTOMY;  Surgeon: Celio Maldonado MD;  [facility]:  [facility];  Service: Gastroenterology   • ENDOSCOPY N/A 1/26/2019    Procedure: ESOPHAGOGASTRODUODENOSCOPY;  Surgeon: Brunner, Mark I, MD;  Location:  [facility];  Service: Gastroenterology   • ENDOSCOPY N/A 12/27/2019    Procedure: ESOPHAGOGASTRODUODENOSCOPY;  Surgeon: Harry  MD Valdemar;  Location: [facility] ENDOSCOPY;  Service: General   • ENDOSCOPY N/A 10/20/2020    Procedure: ESOPHAGOGASTRODUODENOSCOPY WITH DILATION AND COLD FORCEP BIOPSY;  Surgeon: Cale Madrid MD;  Location: [facility] ENDOSCOPY;  Service: Gastroenterology;  Laterality: N/A;   • HERNIA REPAIR     • ORTHOPEDIC SURGERY Left     Left hip arthroplasty   • PACEMAKER IMPLANTATION  2019   • SUPRAPUBIC CATHETER INSERTION      History of Percutaneous Catheter Placement Into Ureter   • THROAT SURGERY      FOR SLEEP APNEA   • VSD REPAIR      History of VSD Repair By Patch Closure     Family History   Problem Relation Age of Onset   • Other Father         CABG   • Coronary artery disease Father    • Colon cancer Neg Hx      Social History     Socioeconomic History   • Marital status:      Spouse name: Not on file   • Number of children: Not on file   • Years of education: Not on file   • Highest education level: Not on file   Tobacco Use   • Smoking status: Former Smoker     Packs/day: 3.00     Years: 30.00     Pack years: 90.00     Types: Cigarettes     Quit date:      Years since quittin.3   • Smokeless tobacco: Never Used   Vaping Use   • Vaping Use: Never used   Substance and Sexual Activity   • Alcohol use: No   • Drug use: No   • Sexual activity: Defer       Current Outpatient Medications:   •  Acetaminophen Extra Strength 500 MG tablet, TAKE 1 TABLET BY MOUTH EVERY 6 HOURS AS NEEDED FOR MILD PAIN, Disp: 180 tablet, Rfl: 3  •  alfuzosin (UROXATRAL) 10 MG 24 hr tablet, @@ TAKE ONE TABLET BY MOUTH DAILY, Disp: 90 tablet, Rfl: 3  •  ALPRAZolam (XANAX) 0.25 MG tablet, TAKE ONE TABLET BY MOUTH TWO TIMES A DAY AS NEEDED, Disp: 30 tablet, Rfl: 1  •  amLODIPine (NORVASC) 5 MG tablet, TAKE 1 AND 1/2 TABLET BY MOUTH EVERY DAY, Disp: 135 tablet, Rfl: 3  •  Aspirin Low Dose 81 MG chewable tablet, @@ TAKE ONE TABLET BY MOUTH DAILY, Disp: 90 tablet, Rfl: 3  •  atorvastatin (LIPITOR) 10 MG tablet, TAKE 1  TABLET BY MOUTH EVERY EVENING, Disp: 90 tablet, Rfl: 3  •  benzonatate (TESSALON) 200 MG capsule, Take 1 capsule by mouth 3 (Three) Times a Day As Needed for Cough., Disp: 45 capsule, Rfl: 0  •  carboxymethylcellulose (REFRESH PLUS) 0.5 % solution, 1 drop 3 (Three) Times a Day As Needed for Dry Eyes., Disp: , Rfl:   •  clopidogrel (PLAVIX) 75 MG tablet, TAKE ONE TABLET BY MOUTH DAILY, Disp: 90 tablet, Rfl: 3  •  cyclobenzaprine (FLEXERIL) 5 MG tablet, Take 5 mg by mouth 3 (Three) Times a Day As Needed for Muscle Spasms., Disp: , Rfl:   •  diphenhydrAMINE-zinc acetate 2-0.1 % cream, Apply  topically to the appropriate area as directed 3 (Three) Times a Day As Needed for Itching., Disp: , Rfl:   •  escitalopram (LEXAPRO) 20 MG tablet, @@ TAKE ONE TABLET BY MOUTH DAILY, Disp: 90 tablet, Rfl: 3  •  famotidine (PEPCID) 40 MG tablet, TAKE ONE TABLET BY MOUTH EVERY DAY, Disp: 90 tablet, Rfl: 3  •  ferrous sulfate 325 (65 Fe) MG tablet, TAKE ONE TABLET BY MOUTH DAILY, Disp: 28 tablet, Rfl: 5  •  finasteride (PROSCAR) 5 MG tablet, TAKE ONE TABLET BY MOUTH DAILY, Disp: 90 tablet, Rfl: 3  •  fluocinonide (LIDEX) 0.05 % cream, Apply  topically to the appropriate area as directed 2 (Two) Times a Day., Disp: , Rfl:   •  Fluticasone-Umeclidin-Vilant (Trelegy Ellipta) 100-62.5-25 MCG/INH aerosol powder , Inhale 1 puff Daily., Disp: 1 each, Rfl: 3  •  furosemide (LASIX) 20 MG tablet, TAKE ONE TABLET BY MOUTH DAILY, Disp: 90 tablet, Rfl: 3  •  HYDROcodone-acetaminophen (NORCO) 5-325 MG per tablet, TAKE 1 TABLET BY MOUTH EVERY 8 HOURS AS NEEDED FOR SEVERE PAIN, Disp: 45 tablet, Rfl: 0  •  hydrocortisone 1 % cream, Apply 1 application topically to the appropriate area as directed 2 (Two) Times a Day., Disp: , Rfl:   •  ipratropium (Atrovent) 0.06 % nasal spray, 2 sprays into the nostril(s) as directed by provider 4 (Four) Times a Day., Disp: 15 mL, Rfl: 1  •  ipratropium-albuterol (DUO-NEB) 0.5-2.5 mg/3 ml nebulizer, Take 3 mL by  nebulization Every 4 (Four) Hours As Needed for Wheezing., Disp: 360 mL, Rfl: 6  •  lidocaine (LIDODERM) 5 %, Place 1 patch on the skin as directed by provider Daily. Remove & Discard patch within 12 hours or as directed by MD, Disp: 30 patch, Rfl: 3  •  magnesium citrate 1.745 GM/30ML solution solution, Take 15 mL by mouth Daily As Needed (constipation)., Disp: , Rfl:   •  melatonin 5 MG tablet tablet, @@ TAKE 1 TABLET BY MOUTH EVERY EVENING, Disp: 90 tablet, Rfl: 3  •  mirtazapine (REMERON) 15 MG tablet, TAKE 1 TABLET BY MOUTH DAILY AT BEDTIME, Disp: 90 tablet, Rfl: 3  •  O2 (OXYGEN), Inhale 4 L/min 1 (One) Time. Walking on 4L and sitting 3L, Disp: , Rfl:   •  oxybutynin (DITROPAN) 5 MG tablet, TAKE 1 TABLET BY MOUTH DAILY AT BEDTIME, Disp: 90 tablet, Rfl: 3  •  pantoprazole (PROTONIX) 40 MG EC tablet, TAKE ONE TABLET BY MOUTH DAILY, Disp: 90 tablet, Rfl: 3  •  potassium chloride 10 MEQ CR tablet, TAKE ONE TABLET BY MOUTH DAILY, Disp: 90 tablet, Rfl: 3  •  predniSONE (DELTASONE) 10 MG tablet, TAKE ONE TABLET BY MOUTH DAILY, Disp: 90 tablet, Rfl: 3  •  promethazine (PHENERGAN) 25 MG tablet, TAKE 1 TABLET BY MOUTH EVERY 6 HOURS AS NEEDED FOR NAUSEA AND VOMITING, Disp: 15 tablet, Rfl: 0  •  sodium chloride 0.65 % nasal spray, 1 spray into the nostril(s) as directed by provider As Needed for Congestion., Disp: , Rfl:   •  Stimulant Laxative 8.6-50 MG per tablet, @@TAKE TWO TABLETS BY MOUTH TWO TIMES A DAY, Disp: 360 tablet, Rfl: 3  •  traZODone (DESYREL) 50 MG tablet, TAKE 1 TABLET BY MOUTH EVERY EVENING, Disp: 90 tablet, Rfl: 3  •  docusate sodium (COLACE) 100 MG capsule, Take 1 capsule by mouth 2 (Two) Times a Day As Needed for Constipation., Disp: 100 capsule, Rfl: 3  •  doxycycline (VIBRAMYCIN) 100 MG capsule, Take 1 capsule by mouth 2 (Two) Times a Day., Disp: 28 capsule, Rfl: 0  •  polyethylene glycol (MIRALAX) 17 g packet, Take 17 g by mouth 2 (Two) Times a Day., Disp: 30 each, Rfl: 0     Allergies   Allergen  Reactions   • Levaquin [Levofloxacin] Hives   • Sulfa Antibiotics Nausea And Vomiting     The following portions of the patient's history were reviewed and updated as appropriate: allergies, current medications, past family history, past medical history, past social history, past surgical history and problem list.  Objective     Physical Exam  Vitals and nursing note reviewed.   Constitutional:       General: He is not in acute distress.     Appearance: Normal appearance. He is well-developed. He is ill-appearing (chronic). He is not diaphoretic.   HENT:      Head: Normocephalic and atraumatic.      Right Ear: Hearing and external ear normal.      Left Ear: Hearing and external ear normal.      Nose: Nose normal.      Mouth/Throat:      Mouth: Mucous membranes are not pale, not dry and not cyanotic.   Eyes:      General: Lids are normal.         Right eye: No discharge.         Left eye: No discharge.      Conjunctiva/sclera: Conjunctivae normal.   Neck:      Trachea: Trachea normal.   Cardiovascular:      Rate and Rhythm: Normal rate and regular rhythm.      Heart sounds: Normal heart sounds.   Pulmonary:      Effort: Pulmonary effort is normal. Tachypnea present. No respiratory distress (patient on oxygen).      Breath sounds: Normal breath sounds.   Chest:      Chest wall: No tenderness.   Abdominal:      General: Bowel sounds are normal. There is no distension.      Palpations: Abdomen is soft. There is no mass.      Tenderness: There is no abdominal tenderness. There is no guarding or rebound.      Hernia: No hernia is present.   Musculoskeletal:      Cervical back: No edema.   Skin:     General: Skin is warm and dry.      Coloration: Skin is not pale.      Findings: No rash.   Neurological:      Mental Status: He is alert and oriented to person, place, and time.      Cranial Nerves: No cranial nerve deficit.      Gait: Gait abnormal (uses rolling walker).   Psychiatric:         Mood and Affect: Mood normal.     "     Speech: Speech normal.         Behavior: Behavior is cooperative.       Vitals:    05/25/21 1516   BP: 145/72   Pulse: 96   Resp: 20   Temp: 97.2 °F (36.2 °C)   Weight: 78.5 kg (173 lb)   Height: 172.7 cm (68\")     Results Review:   I reviewed the patient's new clinical results.    Lab on 05/17/2021   Component Date Value Ref Range Status   • SARS-CoV-2 BARBER 05/17/2021 Not Detected  Not Detected Final   No results displayed because visit has over 200 results.      Lab on 03/12/2021   Component Date Value Ref Range Status   • SARS-CoV-2 BARBER 03/12/2021 Not Detected  Not Detected Final      CT Abdomen Pelvis With Contrast     Result Date: 3/15/2021  No evidence of pulmonary embolus or dissection.      PROCEDURE:  CT ABDOMEN PELVIS W CONTRAST-    FINDINGS:  ABDOMEN: The liver shows fatty infiltration. There is a stone versus polyp in the gallbladder. The spleen is unremarkable. No adrenal mass is present.  The pancreas is normal. A left renal artery stent is present. The kidneys are normal. The aorta is normal in caliber. There is no free fluid or adenopathy. There is colonic diverticulosis without evidence of acute diverticulitis.  PELVIS: The appendix is normal.  There is diffuse wall thickening of the urinary bladder which may be due to chronic obstruction or neurogenic bladder. There is no significant free fluid or adenopathy. There are postsurgical changes of left hip arthroplasty.  IMPRESSION: 1. Diffuse wall thickening of the urinary bladder may be due to chronic obstruction or neurogenic bladder. 2. Fatty infiltration of the liver. 3. Stone versus polyp in the gallbladder.  This report was finalized on 3/15/2021 3:26 PM by Richelle Patel M.D..    XR Chest 1 View     Result Date: 3/18/2021  Cardiomegaly with pulmonary venous hypertension and small effusions.  Continued followup is recommended.  This report was finalized on 3/18/2021 8:34 AM by Richelle Patel M.D..    Gallbladder ultrasound dated " 12/14/2020 cholelithiasis.  No associated biliary obstruction.    Nuclear medicine hepatobiliary scan dated 12/15/2020 normal visualization of activity within biliary tree, gallbladder and drainage into small bowel.    CTAP dated 1/20/2021 Small ureterocele identified at the right UVJ with several bladder diverticulum identified. Small stone in the gallbladder. The remainder of the abdomen and pelvis is grossly unremarkable.    Colonoscopy per Dr. Celio Maldonado dated 12/28/2019 with 1 7 mm polyp at the rectosigmoid colon.  Mild diverticulosis in sigmoid colon and descending colon.  Rectal polyp biopsy with mucosal tag with crush/cautery artifact.  No dysplasia or malignancy.     EGD dated 10/20/2020 no endoscopic esophageal abnormality to explain patient's dysphagia except subtle age-related concentric rings and tertiary contractions.  Esophagus dilated.  Erythematous mucosa in the antrum.  Patient likely has age-related presbyesophagus, no convincing signs of achalasia.  Gastric antrum biopsy with mild reactive gastropathy.  No H. pylori.  Esophagus biopsies distal mid and proximal with squamous mucosa with mild nonspecific chronic inflammation.  Negative for dysplasia, carcinoma or increased intraepithelial eosinophils.     Assessment / Plan      1. Esophageal dysphagia  2. Presbyesophagus  Patient went for esophageal manometry last week but did not hold his blood thinners and was not able to have his procedure done. Swallowing has not changed. He would like to reschedule. Likely has dysphagia associated with COPD and likely has dysmotility.  Recent EGD did not reveal any esophageal narrowing.  Repeat EGD may not be beneficial.  Esophageal manometry to rule out other causes. Advised patient to hold blood thinners as directed prior to procedure.    - Ambulatory Referral to Gastroenterology    11/30/2020  Patient has a history of difficulty swallowing for several years.  Improved after recent EGD with empiric  dilation, but not resolved. No history of weight loss.  No endoscopic esophageal abnormality to explain patient's dysphagia except subtle age-related concentric rings and tertiary contractions.  Patient likely has age-related presbyesophagus, no convincing signs of achalasia. CT chest with no GI abnormality noted.  COPD can contribute to difficulty swallowing: Eat at a slow rate. Put the fork/spoon down between bites to help with slowing down eating. Take small bites and sips. Avoid drinking through straws.  Avoid consecutive drinking of liquids as this requires sustaining airway closure which is difficult for people with lung disease.   Do not eat when short of breath; do recovery breathing (pursed lip breathing) first. Exhale after swallowing. Do not exercise immediately prior to eating.  ALWAYS eat in an upright position and do not lie down right after eating.  Do not talk and eat at the same time. Speech requires coordinated timing of breathing, as does swallowing.   Referral for esophageal manometry to rule out other causes.    3. Gastroesophageal reflux disease without esophagitis  Reasonable control with PPI.  Continue the same.  EGD unremarkable.  Antireflux measures.    4. Drug-induced constipation vs CIC  Longstanding history of constipation.  Doing well at this time.  No history of rectal bleeding. TSH normal. CTAP unremarkable. Abdominal ultrasound unremarkable. Colonoscopy per surgical services unremarkable. He is on long-term opioids.  High fiber, low fat diet with liberal water intake.   May use MiraLAX as needed.    5. Anemia, unspecified type  Patient denies GI bleeding.  CTAP unremarkable.  Previous labs with normal iron panel and B12 level.  CBC in March 2021 with MCV elevated at 100. Likely anemia of chronic disease.  Referral to hematology for evaluation to rule out other causes.    - Iron Profile; Future  - Vitamin B12; Future  - Ferritin; Future  - Ambulatory Referral to Hematology /  Oncology    11/30/2020  Longstanding history of mild anemia.  The patient denies overt GI bleed.  No source of anemia per colonoscopy in 2019 or EGD in 2020.  The patient has a history of kidney disease as well as COPD. MCV normal.  Suspect anemia of chronic disease.  He is obtaining Procrit injections per nephrology as needed. CTAP with unremarkable GI tract.    6. Fatty (change of) liver, not elsewhere classified  Recent CTAP with fatty infiltration of liver noted.  No history of elevated liver enzymes.  High-fiber, low-fat diet with liberal water intake.  Patient unable to exercise at this time due to COPD.    7. Pre-procedural laboratory examination    - COVID PRE-OP / PRE-PROCEDURE SCREENING ORDER (NO ISOLATION) - Swab, Nasopharynx; Future    Patient Instructions   Antireflux measures: Avoid fried, fatty foods, alcohol, chocolate, coffee, tea,  soft drinks, peppermint and spearmint, spicy foods, tomatoes and tomato based foods, onion based foods, and smoking. Other antireflux measures include weight reduction if overweight, avoiding tight clothing around the abdomen, elevating the head of the bed 6 inches with blocks under the head board, and don't drink or eat before going to bed and avoid lying down immediately after meals.  Pantoprazole 40 mg 1 by mouth in the am 30 minutes before breakfast.  High fiber, low fat diet with liberal water intake.   Miralax 17 grams in 8 ounces of liquid as needed for constipation.  Eat at a slow rate. Put the fork/spoon down between bites to help with slowing down eating. Take small bites and sips.   Avoid drinking through straws.   Avoid consecutive drinking of liquids as this requires sustaining airway closure which is difficult for people with lung disease.   Do not eat when short of breath; do recovery breathing (pursed lip breathing) first.   Exhale after swallowing.   Do not exercise immediately prior to eating.   ALWAYS eat in an upright position and do not lie down  right after eating.    Do not talk and eat at the same time. Speech requires coordinated timing of breathing, as does swallowing.   Labs  Referral to hematology to rule out other causes of anemia  Will reschedule esophageal manometry - patient to hold blood thinners prior to procedure  Follow up: After esophageal manometry     Janki Lozano, APRN  5/25/2021    Please note that portions of this note may have been completed with a voice recognition program. Efforts were made to edit the dictations, but occasionally words are mistranscribed.

## 2021-05-25 NOTE — PATIENT INSTRUCTIONS
Antireflux measures: Avoid fried, fatty foods, alcohol, chocolate, coffee, tea,  soft drinks, peppermint and spearmint, spicy foods, tomatoes and tomato based foods, onion based foods, and smoking. Other antireflux measures include weight reduction if overweight, avoiding tight clothing around the abdomen, elevating the head of the bed 6 inches with blocks under the head board, and don't drink or eat before going to bed and avoid lying down immediately after meals.  Pantoprazole 40 mg 1 by mouth in the am 30 minutes before breakfast.  High fiber, low fat diet with liberal water intake.   Miralax 17 grams in 8 ounces of liquid as needed for constipation.  Eat at a slow rate. Put the fork/spoon down between bites to help with slowing down eating. Take small bites and sips.   Avoid drinking through straws.   Avoid consecutive drinking of liquids as this requires sustaining airway closure which is difficult for people with lung disease.   Do not eat when short of breath; do recovery breathing (pursed lip breathing) first.   Exhale after swallowing.   Do not exercise immediately prior to eating.   ALWAYS eat in an upright position and do not lie down right after eating.    Do not talk and eat at the same time. Speech requires coordinated timing of breathing, as does swallowing.   Labs  Referral to hematology to rule out other causes of anemia  Will reschedule esophageal manometry - patient to hold blood thinners prior to procedure  Follow up: After esophageal manometry

## 2021-05-27 NOTE — CONSULTS
Referring Provider: Dr Owens  Reason for Consultation: syncope    Patient Care Team:  Miguel Rojas MD as PCP - General  Miguel Rojas MD as PCP - Family Medicine      History of present illness:    Elderly gentleman who lives by himself yesterday got up from his chair went to the refrigerator after which she does not remember what happened.  He woke himself up on the floor bleeding from his head.  Called his family for help.    Patient admits that he has been having recurrent dizzy spells this happens when he stands up all of the sudden.  Has been happening even when he is sitting not doing any activity on and off feels like something.comes in front of his eyes and then quickly gets better.  There are no premonitory signs.  Has been having these symptoms more and more often in the recent past.  Happens up to one to 2 times a month.    He did have some chest pressure like sensation when he came to the emergency room.  This lasted for about 30 minutes and spontaneously resolved.  Has never had this chest pains in the past.    Review of Systems   Pertinent items are noted in HPI  Review of Systems      History  Baseline EKG: Sinus rhythm MT of 140 ms right bundle branch block left intravascular block nonspecific ST wave changes.  Frequent PVCs seen.    LV function assessment: EF of 45% echocardiogram 1/18 with apical segmental hypokinesis.    CAD workup-cardiac catheterization 5/18-noncritical CAD.    Hypertension-difficult to control status post placement of his CX device followed by Dr. Ngo.    Cerebrovascular disease-status post bilateral carotid endarterectomy with subsequent follow up with an ultrasound 2014.    Renal artery disease: Status post stenting of bilateral renal arteries in the remote past.    Dyslipidemia.    Chronic kidney disease stage III baseline creatinine of 1.41.5.    Obstructive sleep apnea on CPAP therapy.    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 Lopez.    Hypoxemia on  [FreeTextEntry1] : He has multiple lesions to the brain. I think his best option is Stereotactic radiosurgery. I explained to him my reasoning and he seems to agree to the radiation.  oxygen supplementation 4 L/m.    Patent foramen ovale status post closure by Dr. Vahid Alves.    Arthritis.    Depression.    Personal history:    Nonsmoker at this time no history of focal concentric drug abuse function status the patient has been limited lives by himself since he lost his wife.  Thinks he may have to change this arrangement.    Review of symptoms:    Has been short of breath minimal activity makes him very fatigued and tired.  Has been getting dizzy when he stands up and on and off.  Has no history of stroke or weakness at this in the recent past.    Family history: Noncontributory.    Past Surgical History:   Procedure Laterality Date   • CARDIAC CATHETERIZATION     • CARDIAC CATHETERIZATION N/A 2018    Procedure: Left Heart Cath;  Surgeon: Edouard Robertson MD;  Location: [facility] CATH INVASIVE LOCATION;  Service: Cardiovascular   • HERNIA REPAIR     • ORTHOPEDIC SURGERY Left     Left hip arthroplasty   • SUPRAPUBIC CATHETER INSERTION      History of Percutaneous Catheter Placement Into Ureter   • THROAT SURGERY      FOR SLEEP APNEA   • VSD REPAIR      History of VSD Repair By Patch Closure   , Family History   Problem Relation Age of Onset   • Other Father         CABG   • Coronary artery disease Father    , Social History     Tobacco Use   • Smoking status: Former Smoker     Packs/day: 3.00     Years: 30.00     Pack years: 90.00     Types: Cigarettes     Last attempt to quit: 2017     Years since quittin.0   • Smokeless tobacco: Never Used   Substance Use Topics   • Alcohol use: No   • Drug use: No   , Medications Prior to Admission   Medication Sig Dispense Refill Last Dose   • albuterol (PROVENTIL) (2.5 MG/3ML) 0.083% nebulizer solution Take 2.5 mg by nebulization Every 4 (Four) Hours As Needed for Wheezing.   2019 at Unknown time   • alfuzosin (UROXATRAL) 10 MG 24 hr tablet TAKE ONE TABLET BY MOUTH DAILY 30 tablet 2 2019 at Unknown time   • alfuzosin (UROXATRAL) 10 MG 24 hr  tablet Take 10 mg by mouth Daily.      • aspirin 81 MG EC tablet Take 1 tablet by mouth Daily. 90 tablet 3 1/20/2019 at Unknown time   • baclofen (LIORESAL) 10 MG tablet TAKE ONE TABLET BY MOUTH THREE TIMES A DAY 90 tablet 0 1/19/2019 at 2100   • baclofen (LIORESAL) 10 MG tablet TAKE ONE TABLET BY MOUTH THREE TIMES A DAY 90 tablet 0 1/20/2019 at Unknown time   • calcium carbonate (OS-HAFSA) 600 MG tablet Take 600 mg by mouth 2 (Two) Times a Day With Meals.   1/20/2019 at Unknown time   • clopidogrel (PLAVIX) 75 MG tablet TAKE ONE TABLET BY MOUTH DAILY 30 tablet 4 1/20/2019 at Unknown time   • escitalopram (LEXAPRO) 10 MG tablet Take 1 tablet by mouth Daily. (Patient taking differently: Take 10 mg by mouth Daily As Needed.) 90 tablet 3 1/20/2019 at Unknown time   • finasteride (PROSCAR) 5 MG tablet Take 5 mg by mouth Daily.   1/20/2019 at Unknown time   • fluticasone (FLONASE) 50 MCG/ACT nasal spray 1 spray into each nostril Daily. (Patient taking differently: 1 spray into the nostril(s) as directed by provider Daily As Needed.) 16 g 5 1/20/2019 at Unknown time   • furosemide (LASIX) 20 MG tablet TAKE ONE TABLET BY MOUTH DAILY 30 tablet 0 1/20/2019 at Unknown time   • metoprolol tartrate (LOPRESSOR) 25 MG tablet Take 1 tablet by mouth Every 12 (Twelve) Hours. 180 tablet 3 1/20/2019 at Unknown time   • predniSONE (DELTASONE) 10 MG tablet TAKE ONE TABLET BY MOUTH DAILY 30 tablet 0 1/20/2019 at Unknown time   • spironolactone (ALDACTONE) 25 MG tablet TAKE ONE TABLET BY MOUTH DAILY 30 tablet 3 1/20/2019 at Unknown time   • ALPRAZolam (XANAX) 0.5 MG tablet TAKE ONE TABLET BY MOUTH ONCE NIGHTLY AS NEEDED FOR ANXIETY 30 tablet 2 1/19/2019 at 2100   • atorvastatin (LIPITOR) 40 MG tablet TAKE ONE TABLET BY MOUTH EVERY NIGHT AT BEDTIME 30 tablet 4 1/19/2019 at 2100   • baclofen (LIORESAL) 10 MG tablet TAKE ONE TABLET BY MOUTH THREE TIMES A DAY 90 tablet 0    • BREO ELLIPTA 200-25 MCG/INH aerosol powder  Inhale 1 each Daily. 60  each 5 Taking   • budesonide (PULMICORT) 0.5 MG/2ML nebulizer solution Take 2 mL by nebulization 2 (Two) Times a Day.   Taking   • furosemide (LASIX) 20 MG tablet TAKE ONE TABLET BY MOUTH DAILY 90 tablet 0    • furosemide (LASIX) 20 MG tablet TAKE ONE TABLET BY MOUTH DAILY 30 tablet 0    • furosemide (LASIX) 20 MG tablet TAKE ONE TABLET BY MOUTH DAILY 30 tablet 0    • ipratropium-albuterol (DUO-NEB) 0.5-2.5 mg/mL nebulizer Take 3 mL by nebulization Every 6 (Six) Hours. 360 mL  Taking   • iron polysaccharides (NIFEREX) 150 MG capsule Take 150 mg by mouth 2 (Two) Times a Day.   Taking   • mirtazapine (REMERON) 15 MG tablet Take 15 mg by mouth Every Night.   1/19/2019 at 2100   • nitroglycerin (NITROSTAT) 0.4 MG SL tablet Place 0.4 mg under the tongue Every 5 (Five) Minutes As Needed.   Taking   • O2 (OXYGEN) Inhale 4 L/min 1 (One) Time. Walking on 4L and sitting 3L   Taking   • oxybutynin (DITROPAN) 5 MG tablet TAKE ONE TABLET BY MOUTH EVERY NIGHT AT BEDTIME 30 tablet 3 1/19/2019 at 2100   • predniSONE (DELTASONE) 20 MG tablet 3 tabs daily for 5 days 15 tablet 0 Taking   • PROAIR  (90 BASE) MCG/ACT inhaler Inhale 2 puffs Every 4 (Four) Hours As Needed for Wheezing or Shortness of Air.   Taking   • traMADol (ULTRAM) 50 MG tablet TAKE ONE TABLET BY MOUTH EVERY 8 HOURS AS NEEDED FOR MODERATE PAIN 60 tablet 0    , Scheduled Meds:    aspirin 81 mg Oral Daily   atorvastatin 40 mg Oral Daily   budesonide-formoterol 2 puff Inhalation BID - RT   calcium-vitamin D 500 mg Oral BID   docusate sodium 100 mg Oral BID   escitalopram 10 mg Oral Daily   ipratropium-albuterol 3 mL Nebulization Q6H - RT   metoprolol tartrate 25 mg Oral Q12H   sodium chloride 3 mL Intravenous Q12H   tamsulosin 0.4 mg Oral Nightly   , Continuous Infusions:   , PRN Meds:  •  acetaminophen  •  albuterol  •  ALPRAZolam  •  HYDROcodone-acetaminophen  •  Morphine **AND** naloxone  •  ondansetron **OR** ondansetron ODT **OR** ondansetron  •  sodium  "chloride  •  sodium chloride, Allergies:  Mucomyst [acetylcysteine]; Milk-related compounds; and Sulfa antibiotics     Objective     Vital Sign Min/Max for last 24 hours  Temp  Min: 98.9 °F (37.2 °C)  Max: 98.9 °F (37.2 °C)   BP  Min: 121/74  Max: 174/63   Pulse  Min: 74  Max: 96   Resp  Min: 16  Max: 16   SpO2  Min: 97 %  Max: 100 %   No Data Recorded   Weight  Min: 74.8 kg (165 lb)  Max: 75.2 kg (165 lb 11.2 oz)     Flowsheet Rows      First Filed Value   Admission Height  172.7 cm (68\") Documented at 01/20/2019 1752   Admission Weight  74.8 kg (165 lb) Documented at 01/20/2019 1752               Echo EF Estimated  Lab Results   Component Value Date    ECHOEFEST 42 09/10/2017         Physical Exam:     General Appearance:    Alert, cooperative, in no acute distress   Head:    Normocephalic, without obvious abnormality, atraumatic   Eyes:            Lids and lashes normal, conjunctivae and sclerae normal, no   icterus, no pallor, corneas clear, PERRLA   Ears:    Ears appear intact with no abnormalities noted   Throat:   No oral lesions, no thrush, oral mucosa moist   Neck:   No adenopathy, supple, trachea midline, no thyromegaly, no   carotid bruit, no JVD   Back:     No kyphosis present, no scoliosis present, no skin lesions,      erythema or scars, no tenderness to percussion or                   palpation,   range of motion normal   Lungs:   Decreased air entry bilaterally.      Heart:    Regular rhythm and normal rate, normal S1 and S2, no            murmur, no gallop, no rub, no click   Chest Wall:    No abnormalities observed   Abdomen:     Normal bowel sounds, no masses, no organomegaly, soft        non-tender, non-distended, no guarding, no rebound                tenderness   Rectal:     Deferred   Extremities:   Moves all extremities well, no edema, no cyanosis, no             redness   Pulses:   Pulses palpable and equal bilaterally   Skin:   No bleeding, bruising or rash   Lymph nodes:   No palpable " adenopathy   Neurologic:   Cranial nerves 2 - 12 grossly intact, sensation intact, DTR       present and equal bilaterally   Orthostasis: Negative.    Results Review:   I reviewed the patient's new clinical results.    EKG: Sinus tachycardia right bundle branch block left intravascular block PVCs seen.    Telemetry average heart rate in the 80s.  No pauses noted.  No dysrhythmias seen.  Frequent PVCs identified.     LAB DATA :           WBC   Date Value Ref Range Status   01/20/2019 7.91 4.80 - 10.80 10*3/mm3 Final     RBC   Date Value Ref Range Status   01/20/2019 3.66 (L) 4.70 - 6.10 10*6/mm3 Final     Hemoglobin   Date Value Ref Range Status   01/20/2019 11.8 (L) 14.0 - 18.0 g/dL Final     Hematocrit   Date Value Ref Range Status   01/20/2019 36.2 (L) 42.0 - 52.0 % Final     MCV   Date Value Ref Range Status   01/20/2019 98.9 (H) 80.0 - 94.0 fL Final     MCH   Date Value Ref Range Status   01/20/2019 32.2 (H) 27.0 - 31.0 pg Final     MCHC   Date Value Ref Range Status   01/20/2019 32.6 30.0 - 37.0 g/dL Final     RDW   Date Value Ref Range Status   01/20/2019 12.0 11.5 - 14.5 % Final     RDW-SD   Date Value Ref Range Status   01/20/2019 43.9 37.0 - 54.0 fl Final     MPV   Date Value Ref Range Status   01/20/2019 8.7 6.0 - 12.0 fL Final     Platelets   Date Value Ref Range Status   01/20/2019 178 130 - 400 10*3/mm3 Final     Neutrophil %   Date Value Ref Range Status   01/20/2019 72.3 37.0 - 80.0 % Final     Lymphocyte %   Date Value Ref Range Status   01/20/2019 16.6 10.0 - 50.0 % Final     Monocyte %   Date Value Ref Range Status   01/20/2019 8.7 0.0 - 12.0 % Final     Eosinophil %   Date Value Ref Range Status   01/20/2019 1.6 0.0 - 7.0 % Final     Basophil %   Date Value Ref Range Status   01/20/2019 0.4 0.0 - 2.5 % Final     Immature Grans %   Date Value Ref Range Status   01/20/2019 0.4 0.0 - 0.6 % Final     Neutrophils, Absolute   Date Value Ref Range Status   01/20/2019 5.72 2.00 - 6.90 10*3/mm3 Final      Lymphocytes, Absolute   Date Value Ref Range Status   01/20/2019 1.31 0.60 - 3.40 10*3/mm3 Final     Monocytes, Absolute   Date Value Ref Range Status   01/20/2019 0.69 0.00 - 0.90 10*3/mm3 Final     Eosinophils, Absolute   Date Value Ref Range Status   01/20/2019 0.13 0.00 - 0.70 10*3/mm3 Final     Basophils, Absolute   Date Value Ref Range Status   01/20/2019 0.03 0.00 - 0.20 10*3/mm3 Final     Immature Grans, Absolute   Date Value Ref Range Status   01/20/2019 0.03 0.00 - 0.06 10*3/mm3 Final     nRBC   Date Value Ref Range Status   01/20/2019 0.0 0.0 - 0.0 /100 WBC Final       Lab Results   Component Value Date    GLUCOSE 101 (H) 01/20/2019    BUN 50 (H) 01/20/2019    CREATININE 1.40 (H) 01/20/2019    EGFRIFNONA 50 (L) 01/20/2019    EGFRIFAFRI 67 08/14/2018    BCR 35.7 (H) 01/20/2019    CO2 34.0 (H) 01/20/2019    CALCIUM 9.6 01/20/2019    PROTENTOTREF 6.7 08/14/2018    ALBUMIN 4.60 01/20/2019    LABIL2 1.7 08/14/2018    AST 45 01/20/2019    ALT 25 01/20/2019       Lab Results   Component Value Date    CKTOTAL 45 11/13/2017    CKMB 3.20 (C) 03/18/2017    CKMBINDEX 5.2 (H) 03/18/2017    TROPONINI 0.052 (H) 01/20/2019       No results found for: DDIMER    Site   Date Value Ref Range Status   01/20/2019 Right Radial  Final     Mynor's Test   Date Value Ref Range Status   01/20/2019 Positive  Final     pH, Arterial   Date Value Ref Range Status   01/20/2019 7.402 7.300 - 7.500 pH units Final     pCO2, Arterial   Date Value Ref Range Status   01/20/2019 60.6 (C) 35.0 - 45.0 mm Hg Final     Comment:     83 Value above reference range     pO2, Arterial   Date Value Ref Range Status   01/20/2019 123.0 (H) 75.0 - 100.0 mm Hg Final     Comment:     83 Value above reference range     HCO3, Arterial   Date Value Ref Range Status   01/20/2019 37.7 (H) 22.0 - 28.0 mmol/L Final     Comment:     83 Value above reference range     Base Excess, Arterial   Date Value Ref Range Status   01/20/2019 10.9 (H) 0.0 - 2.0 mmol/L Final      Comment:     83 Value above reference range     O2 Saturation, Arterial   Date Value Ref Range Status   01/20/2019 99.2 94.0 - 100.0 % Final     Comment:     83 Value above reference range     Hemoglobin, Blood Gas   Date Value Ref Range Status   01/20/2019 11.4 (L) 12 - 18 g/dL Final     Comment:     84 Value below reference range     Hematocrit, Blood Gas   Date Value Ref Range Status   01/20/2019 35.1 % Final     Comment:     84 Value below reference range     Oxyhemoglobin   Date Value Ref Range Status   01/20/2019 97.6 94 - 99 % Final     Methemoglobin   Date Value Ref Range Status   01/20/2019 0.90 0.00 - 1.50 % Final     Carboxyhemoglobin   Date Value Ref Range Status   01/20/2019 0.7 0 - 2 % Final     Barometric Pressure for Blood Gas   Date Value Ref Range Status   01/20/2019 741 mmHg Final     Modality   Date Value Ref Range Status   01/20/2019 Nasal Cannula  Final     FIO2   Date Value Ref Range Status   01/20/2019 36 % Final     Lab Results   Component Value Date    HGBA1C 5.60 11/13/2017         No results found for: LIPASE    IMAGING DATA:     Ct Head Without Contrast    Result Date: 1/20/2019  Narrative: FINAL REPORT TECHNIQUE: Routine axial images through the head were obtained without contrast. CLINICAL HISTORY: . syncope, fall FINDINGS: The ventricles are normal.  There is no mass or other abnormal hypodensity.  There is no shift of midline structures.  There is no intracranial hemorrhage.  No significant osseous or sinus abnormality is seen.     Impression: Unremarkable. Authenticated by Savage Huynh M.D. on 01/20/2019 07:15:09 PM    Ct Head Without Contrast    Result Date: 12/24/2018  Narrative: FINAL REPORT TECHNIQUE: Routine axial images through the head were obtained without contrast. CLINICAL HISTORY: Head trauma, minor, GCS>=13, NOC/NEXUS/CCR postive, first study FINDINGS: The ventricles are normal.  There is no mass or other abnormal hypodensity.  There is no shift of midline structures.   There is no intracranial hemorrhage.  The sinuses are clear and no significant osseous abnormality is seen.     Impression: Unremarkable. Authenticated by Savage Huynh M.D. on 12/24/2018 08:01:57 PM    Ct Cervical Spine Without Contrast    Result Date: 1/20/2019  Narrative: FINAL REPORT TECHNIQUE: Thin section axial images were obtained through the cervical spine without contrast.  Coronal and sagittal reconstructed images were then provided. CLINICAL HISTORY: fall FINDINGS: Bilateral vagus nerve stimulators are seen.  The prevertebral soft tissues are normal. There is normal alignment and curvature.   There is no fracture.  There are mild changes of degenerative disc disease.     Impression: No acute abnormality. Authenticated by Savage Huynh M.D. on 01/20/2019 07:16:16 PM    Ct Cervical Spine Without Contrast    Result Date: 12/24/2018  Narrative: FINAL REPORT TECHNIQUE: Thin section axial images were obtained through the cervical spine without contrast.  Coronal and sagittal reconstructed images were then provided. CLINICAL HISTORY: C-spine trauma, NEXUS/CCR negative, low risk FINDINGS: There is normal alignment and curvature.  Prevertebral soft tissues are normal.  There is no fracture.  There is no significant degenerative disease.     Impression: Unremarkable. Authenticated by Savage Huynh M.D. on 12/24/2018 08:02:04 PM        DIAGNOSIS   #1 syncope:  Patient is presented with episode of syncope with loss of consciousness.  Admits that he has been having a lot of the symptoms have been recurring over and over again over the last one year.  He has a baseline EKG that has bifascicular block with occasional to frequent PVCs/PACs.  There is a reasonable probability that this is originating from his cardiac rhythm.  Will need a 30 day event monitor to evaluate for any significant dysrhythmia that could be captured when he has the symptoms.  In the interim we will obtain the blood work to rule out metabolic causes.    #2  coronary artery disease:  Has had a cardiac catheterization within the last one year which is been told to be in significant CAD.  His enzymes are up on an still up trending.  Will continue to monitor the same.  If significant spike noted may have to reconsider this otherwise medical management will be pursued.    #3 CHF:  Has had diminished LV systolic function in the past.  We will repeat the echocardiogram to assess the status of the same.    #4 valvular heart disease:  Noted to have mild aortic stenosis with mild aortic insufficiency this is unlikely to be an issue with respect to his presentation.    #5 cor pulmonale:  Has severe COPD with emphysema evaluation of his right heart during this echocardiogram will be pursued.        Thank you for ready me take part in the care of Mr. Pena        Syncope and collapse    Anxiety    Atherosclerotic heart disease of native coronary artery without angina pectoris    Chronic kidney disease, stage III (moderate) (CMS/HCC)    Steroid-dependent COPD (CMS/HCC)    Diabetes mellitus (CMS/HCC)    GERD without esophagitis    Hyperlipidemia    Hypertension    Mitral and aortic valve disease    Sleep apnea    Depression    History of ventricular septal defect          I discussed the patients findings and my recommendations with patient    Bret Acosta MD  01/21/19  4:41 AM      Please note that portions of this note may have been completed with a voice recognition program. Efforts were made to edit the dictations, but occasionally words are mistranscribed.

## 2021-06-14 NOTE — PROGRESS NOTES
Subjective   Jani Pena is a 71 y.o. male.     Chief Complaint   Patient presents with   • Palpitations     2 weeks; highest reported reading 136; denies anxiety   • Bleeding/Bruising       History of Present Illness   Two week palpitation for 20 seconds and low O2 sat. exertional short of breath.  Patient states today he feels much better.  While in office O2 sat 91% on 4 L oxygen while sitting still.  Patient denies any cough chest discomfort.  Blood pressure stable on medication.  Anxiety stable patient needs a Xanax refill.    Current Outpatient Medications:   •  Acetaminophen Extra Strength 500 MG tablet, TAKE 1 TABLET BY MOUTH EVERY 6 HOURS AS NEEDED FOR MILD PAIN, Disp: 180 tablet, Rfl: 3  •  alfuzosin (UROXATRAL) 10 MG 24 hr tablet, @@ TAKE ONE TABLET BY MOUTH DAILY, Disp: 90 tablet, Rfl: 3  •  ALPRAZolam (XANAX) 0.25 MG tablet, Take 1 tablet by mouth 2 (Two) Times a Day As Needed (anxiety)., Disp: 30 tablet, Rfl: 1  •  Aspirin Low Dose 81 MG chewable tablet, @@ TAKE ONE TABLET BY MOUTH DAILY, Disp: 90 tablet, Rfl: 3  •  atorvastatin (LIPITOR) 10 MG tablet, TAKE 1 TABLET BY MOUTH EVERY EVENING, Disp: 90 tablet, Rfl: 3  •  benzonatate (TESSALON) 200 MG capsule, Take 1 capsule by mouth 3 (Three) Times a Day As Needed for Cough., Disp: 45 capsule, Rfl: 0  •  carboxymethylcellulose (REFRESH PLUS) 0.5 % solution, 1 drop 3 (Three) Times a Day As Needed for Dry Eyes., Disp: , Rfl:   •  clopidogrel (PLAVIX) 75 MG tablet, TAKE ONE TABLET BY MOUTH DAILY, Disp: 90 tablet, Rfl: 3  •  cyclobenzaprine (FLEXERIL) 5 MG tablet, Take 5 mg by mouth 3 (Three) Times a Day As Needed for Muscle Spasms., Disp: , Rfl:   •  diphenhydrAMINE-zinc acetate 2-0.1 % cream, Apply  topically to the appropriate area as directed 3 (Three) Times a Day As Needed for Itching., Disp: , Rfl:   •  docusate sodium (COLACE) 100 MG capsule, Take 1 capsule by mouth 2 (Two) Times a Day As Needed for Constipation., Disp: 100 capsule, Rfl: 3  •   escitalopram (LEXAPRO) 20 MG tablet, @@ TAKE ONE TABLET BY MOUTH DAILY, Disp: 90 tablet, Rfl: 3  •  famotidine (PEPCID) 40 MG tablet, TAKE ONE TABLET BY MOUTH EVERY DAY, Disp: 90 tablet, Rfl: 3  •  ferrous sulfate 325 (65 Fe) MG tablet, TAKE ONE TABLET BY MOUTH DAILY, Disp: 28 tablet, Rfl: 5  •  finasteride (PROSCAR) 5 MG tablet, TAKE ONE TABLET BY MOUTH DAILY, Disp: 90 tablet, Rfl: 3  •  fluocinonide (LIDEX) 0.05 % cream, Apply  topically to the appropriate area as directed 2 (Two) Times a Day., Disp: , Rfl:   •  furosemide (LASIX) 20 MG tablet, TAKE ONE TABLET BY MOUTH DAILY, Disp: 90 tablet, Rfl: 3  •  HYDROcodone-acetaminophen (NORCO) 5-325 MG per tablet, TAKE 1 TABLET BY MOUTH EVERY 8 HOURS AS NEEDED FOR SEVERE PAIN, Disp: 45 tablet, Rfl: 0  •  hydrocortisone 1 % cream, Apply 1 application topically to the appropriate area as directed 2 (Two) Times a Day., Disp: , Rfl:   •  ipratropium (Atrovent) 0.06 % nasal spray, 2 sprays into the nostril(s) as directed by provider 4 (Four) Times a Day., Disp: 15 mL, Rfl: 1  •  ipratropium-albuterol (DUO-NEB) 0.5-2.5 mg/3 ml nebulizer, Take 3 mL by nebulization Every 4 (Four) Hours As Needed for Wheezing., Disp: 360 mL, Rfl: 6  •  lidocaine (LIDODERM) 5 %, Place 1 patch on the skin as directed by provider Daily. Remove & Discard patch within 12 hours or as directed by MD, Disp: 30 patch, Rfl: 3  •  magnesium citrate 1.745 GM/30ML solution solution, Take 15 mL by mouth Daily As Needed (constipation)., Disp: , Rfl:   •  melatonin 5 MG tablet tablet, @@ TAKE 1 TABLET BY MOUTH EVERY EVENING, Disp: 90 tablet, Rfl: 3  •  mirtazapine (REMERON) 15 MG tablet, TAKE 1 TABLET BY MOUTH DAILY AT BEDTIME, Disp: 90 tablet, Rfl: 3  •  O2 (OXYGEN), Inhale 4 L/min 1 (One) Time. Walking on 4L and sitting 3L, Disp: , Rfl:   •  oxybutynin (DITROPAN) 5 MG tablet, TAKE 1 TABLET BY MOUTH DAILY AT BEDTIME, Disp: 90 tablet, Rfl: 3  •  pantoprazole (PROTONIX) 40 MG EC tablet, TAKE ONE TABLET BY MOUTH  DAILY, Disp: 90 tablet, Rfl: 3  •  polyethylene glycol (MIRALAX) 17 g packet, Take 17 g by mouth 2 (Two) Times a Day., Disp: 30 each, Rfl: 0  •  potassium chloride 10 MEQ CR tablet, TAKE ONE TABLET BY MOUTH DAILY, Disp: 90 tablet, Rfl: 3  •  predniSONE (DELTASONE) 10 MG tablet, TAKE ONE TABLET BY MOUTH DAILY, Disp: 90 tablet, Rfl: 3  •  promethazine (PHENERGAN) 25 MG tablet, TAKE 1 TABLET BY MOUTH EVERY 6 HOURS AS NEEDED FOR NAUSEA AND VOMITING, Disp: 15 tablet, Rfl: 0  •  sodium chloride 0.65 % nasal spray, 1 spray into the nostril(s) as directed by provider As Needed for Congestion., Disp: , Rfl:   •  Stimulant Laxative 8.6-50 MG per tablet, @@TAKE TWO TABLETS BY MOUTH TWO TIMES A DAY, Disp: 360 tablet, Rfl: 3  •  traZODone (DESYREL) 50 MG tablet, TAKE 1 TABLET BY MOUTH EVERY EVENING, Disp: 90 tablet, Rfl: 3  •  Trelegy Ellipta 100-62.5-25 MCG/INH inhaler, INHALE ONE PUFF BY MOUTH EVERY DAY, Disp: 60 each, Rfl: 3  •  dilTIAZem CD (Cardizem CD) 180 MG 24 hr capsule, Take 1 capsule by mouth Daily., Disp: 30 capsule, Rfl: 1  •  doxycycline (VIBRAMYCIN) 100 MG capsule, Take 1 capsule by mouth 2 (Two) Times a Day., Disp: 28 capsule, Rfl: 0    The following portions of the patient's history were reviewed and updated as appropriate: allergies, current medications, past family history, past medical history, past social history, past surgical history and problem list.    Review of Systems   Constitutional: Positive for fatigue.   Respiratory: Positive for shortness of breath. Negative for chest tightness.    Cardiovascular: Negative.  Negative for chest pain.   Gastrointestinal: Negative.    Musculoskeletal: Negative.    Skin: Negative.    Neurological: Negative.    Psychiatric/Behavioral: Negative.        Objective   Physical Exam  HENT:      Head: Normocephalic and atraumatic.   Cardiovascular:      Rate and Rhythm: Normal rate and regular rhythm.      Heart sounds: Normal heart sounds.   Pulmonary:      Effort:  Pulmonary effort is normal.      Breath sounds: Normal breath sounds.   Abdominal:      General: Bowel sounds are normal.   Musculoskeletal:      Cervical back: Neck supple.   Skin:     General: Skin is warm.   Neurological:      Mental Status: He is alert and oriented to person, place, and time.           All tests have been reviewed.    Assessment/Plan   Diagnoses and all orders for this visit:    Palpitation initiate diltiazem to replace Norvasc  -     Ambulatory Referral to Cardiology  -     dilTIAZem CD (Cardizem CD) 180 MG 24 hr capsule; Take 1 capsule by mouth Daily.    Anxiety refill medication  -     ALPRAZolam (XANAX) 0.25 MG tablet; Take 1 tablet by mouth 2 (Two) Times a Day As Needed (anxiety).    Steroid-dependent COPD (CMS/HCC) continue present management  copd cont med and O2              ECG 12 Lead    Date/Time: 6/14/2021 6:37 PM  Performed by: Miguel Rojas MD  Authorized by: Miguel Rojas MD   Comparison: compared with previous ECG from 12/22/2020  Similar to previous ECG  Rhythm: sinus rhythm  Ectopy: unifocal PVCs and atrial premature contractions  Rate: normal    Clinical impression: abnormal EKG

## 2021-06-21 NOTE — PROGRESS NOTES
[facility]  Office Progress Note  Jani Pena  1950  [address]  [facility] [place] [facility] [address]       Visit Date: 06/21/21    PCP: Miguel Rojas MD  [address]  [facility] [address]    IDENTIFICATION: A 71 y.o. male  resident of [place], [place].    PROBLEM LIST:   1. CAD  1. 1994, 2008- [facility] nonobstr  2. 2011 SE wnl  3. 5/18 [facility] nonobst cad Dr Robertson  2. Abnl Echo  1. 2013 EF 45 w BBB  2. 1/3/18 echo: EF 45-50%, moderate LA enlargement, mitral thickening with mild MR, sclerosed aortic valve with mild AI, mild TR, apical segmental hypokinesis  3. CVD  1. B CEA -[facility] remote  2. CUS [facility] 2014 nonobst  4. HTN  5. Remote renal artery stent  6. HL  1. 6/14 137/149/42/65  7. SSS  1. 1/31/19 [facility]  8. CKD 3-2.4 2020  9. PEDRO  10. COPD  11. PFO closure Dr Alves       Chief Complaint   Patient presents with   • Atrial Fibrillation   • Coronary Artery Disease       Allergies  Allergies   Allergen Reactions   • Levaquin [Levofloxacin] Hives   • Sulfa Antibiotics Nausea And Vomiting       Current Medications    Current Outpatient Medications:   •  Acetaminophen Extra Strength 500 MG tablet, TAKE 1 TABLET BY MOUTH EVERY 6 HOURS AS NEEDED FOR MILD PAIN, Disp: 180 tablet, Rfl: 3  •  alfuzosin (UROXATRAL) 10 MG 24 hr tablet, @@ TAKE ONE TABLET BY MOUTH DAILY, Disp: 90 tablet, Rfl: 3  •  ALPRAZolam (XANAX) 0.25 MG tablet, Take 1 tablet by mouth 2 (Two) Times a Day As Needed (anxiety)., Disp: 30 tablet, Rfl: 1  •  Aspirin Low Dose 81 MG chewable tablet, @@ TAKE ONE TABLET BY MOUTH DAILY, Disp: 90 tablet, Rfl: 3  •  atorvastatin (LIPITOR) 10 MG tablet, TAKE 1 TABLET BY MOUTH EVERY EVENING, Disp: 90 tablet, Rfl: 3  •  benzonatate (TESSALON) 200 MG capsule, Take 1 capsule by mouth 3 (Three) Times a Day As Needed for Cough., Disp: 45 capsule, Rfl: 0  •  carboxymethylcellulose (REFRESH PLUS) 0.5 % solution, 1 drop 3 (Three) Times a Day As Needed for Dry Eyes., Disp: ,  Rfl:   •  clopidogrel (PLAVIX) 75 MG tablet, TAKE ONE TABLET BY MOUTH DAILY, Disp: 90 tablet, Rfl: 3  •  cyclobenzaprine (FLEXERIL) 5 MG tablet, Take 5 mg by mouth 3 (Three) Times a Day As Needed for Muscle Spasms., Disp: , Rfl:   •  dilTIAZem CD (Cardizem CD) 180 MG 24 hr capsule, Take 1 capsule by mouth Daily., Disp: 90 capsule, Rfl: 3  •  diphenhydrAMINE-zinc acetate 2-0.1 % cream, Apply  topically to the appropriate area as directed 3 (Three) Times a Day As Needed for Itching., Disp: , Rfl:   •  docusate sodium (COLACE) 100 MG capsule, Take 1 capsule by mouth 2 (Two) Times a Day As Needed for Constipation., Disp: 100 capsule, Rfl: 3  •  escitalopram (LEXAPRO) 20 MG tablet, @@ TAKE ONE TABLET BY MOUTH DAILY, Disp: 90 tablet, Rfl: 3  •  famotidine (PEPCID) 40 MG tablet, TAKE ONE TABLET BY MOUTH EVERY DAY, Disp: 90 tablet, Rfl: 3  •  FeroSul 325 (65 Fe) MG tablet, @@TAKE ONE TABLET BY MOUTH DAILY, Disp: 28 tablet, Rfl: 11  •  finasteride (PROSCAR) 5 MG tablet, TAKE ONE TABLET BY MOUTH DAILY, Disp: 90 tablet, Rfl: 3  •  fluocinonide (LIDEX) 0.05 % cream, Apply  topically to the appropriate area as directed 2 (Two) Times a Day., Disp: , Rfl:   •  furosemide (LASIX) 20 MG tablet, TAKE ONE TABLET BY MOUTH DAILY, Disp: 90 tablet, Rfl: 3  •  HYDROcodone-acetaminophen (NORCO) 5-325 MG per tablet, TAKE 1 TABLET BY MOUTH EVERY 8 HOURS AS NEEDED FOR SEVERE PAIN, Disp: 45 tablet, Rfl: 0  •  hydrocortisone 1 % cream, Apply 1 application topically to the appropriate area as directed 2 (Two) Times a Day., Disp: , Rfl:   •  ipratropium (Atrovent) 0.06 % nasal spray, 2 sprays into the nostril(s) as directed by provider 4 (Four) Times a Day., Disp: 15 mL, Rfl: 1  •  ipratropium-albuterol (DUO-NEB) 0.5-2.5 mg/3 ml nebulizer, Take 3 mL by nebulization Every 4 (Four) Hours As Needed for Wheezing., Disp: 360 mL, Rfl: 6  •  lidocaine (LIDODERM) 5 %, Place 1 patch on the skin as directed by provider Daily. Remove & Discard patch within  12 hours or as directed by MD, Disp: 30 patch, Rfl: 3  •  magnesium citrate 1.745 GM/30ML solution solution, Take 15 mL by mouth Daily As Needed (constipation)., Disp: , Rfl:   •  melatonin 5 MG tablet tablet, @@ TAKE 1 TABLET BY MOUTH EVERY EVENING, Disp: 90 tablet, Rfl: 3  •  mirtazapine (REMERON) 15 MG tablet, TAKE 1 TABLET BY MOUTH DAILY AT BEDTIME, Disp: 90 tablet, Rfl: 3  •  O2 (OXYGEN), Inhale 4 L/min 1 (One) Time. Walking on 4L and sitting 3L, Disp: , Rfl:   •  oxybutynin (DITROPAN) 5 MG tablet, TAKE 1 TABLET BY MOUTH DAILY AT BEDTIME, Disp: 90 tablet, Rfl: 3  •  pantoprazole (PROTONIX) 40 MG EC tablet, TAKE ONE TABLET BY MOUTH DAILY, Disp: 90 tablet, Rfl: 3  •  polyethylene glycol (MIRALAX) 17 g packet, Take 17 g by mouth 2 (Two) Times a Day., Disp: 30 each, Rfl: 0  •  potassium chloride 10 MEQ CR tablet, TAKE ONE TABLET BY MOUTH DAILY, Disp: 90 tablet, Rfl: 3  •  predniSONE (DELTASONE) 10 MG tablet, TAKE ONE TABLET BY MOUTH DAILY, Disp: 90 tablet, Rfl: 3  •  promethazine (PHENERGAN) 25 MG tablet, TAKE 1 TABLET BY MOUTH EVERY 6 HOURS AS NEEDED FOR NAUSEA AND VOMITING, Disp: 15 tablet, Rfl: 0  •  sodium chloride 0.65 % nasal spray, 1 spray into the nostril(s) as directed by provider As Needed for Congestion., Disp: , Rfl:   •  Stimulant Laxative 8.6-50 MG per tablet, @@TAKE TWO TABLETS BY MOUTH TWO TIMES A DAY, Disp: 360 tablet, Rfl: 3  •  traZODone (DESYREL) 50 MG tablet, TAKE 1 TABLET BY MOUTH EVERY EVENING, Disp: 90 tablet, Rfl: 3  •  Trelegy Ellipta 100-62.5-25 MCG/INH inhaler, INHALE ONE PUFF BY MOUTH EVERY DAY, Disp: 60 each, Rfl: 3      History of Present Illness   Jani Pena is a 71 y.o. year old male here for follow up.  Working follow-up as his heart rate increases recent visit with PCP.  He states his oxygen level struggles to stay above 90%    .      OBJECTIVE:  Vitals:    06/21/21 1112   BP: 122/64   BP Location: Right arm   Patient Position: Sitting   Pulse: 91   SpO2: 90%   Weight: 79  "kg (174 lb 3.2 oz)   Height: 172.7 cm (68\")     Physical Exam  Constitutional:       Appearance: He is well-developed.      Comments: Chronic O2   Neck:      Thyroid: No thyromegaly.      Vascular: No carotid bruit, hepatojugular reflux or JVD.      Trachea: No tracheal deviation.   Cardiovascular:      Rate and Rhythm: Normal rate and regular rhythm.      Chest Wall: PMI is not displaced.      Pulses: Normal pulses and intact distal pulses. No midsystolic click and no opening snap.           Radial pulses are 2+ on the right side and 2+ on the left side.        Dorsalis pedis pulses are 2+ on the right side and 2+ on the left side.        Posterior tibial pulses are 2+ on the right side and 2+ on the left side.      Heart sounds: S1 normal and S2 normal. Heart sounds not distant. No murmur heard.   No friction rub. No gallop.    Pulmonary:      Effort: Pulmonary effort is normal.      Breath sounds: Normal breath sounds. No wheezing or rales.   Abdominal:      General: Bowel sounds are normal.      Palpations: Abdomen is soft. There is no mass.      Tenderness: There is no abdominal tenderness. There is no guarding.   Musculoskeletal:      Cervical back: Normal range of motion and neck supple.         Diagnostic Data:  Procedures      ASSESSMENT:   Diagnosis Plan   1. Sinus tachycardia     2. Palpitation  dilTIAZem CD (Cardizem CD) 180 MG 24 hr capsule   3. AV block, Mobitz II     4. Essential hypertension     5. Mixed hyperlipidemia         PLAN:  Tachycardia reactive due to hypoxemia.  Acceptable AV pacing EKGs today.  I would raise diltiazem 240 mg daily and attempt to suppress reactive tachycardia    CAD historically nonobstructive continue medical management    AV block post pacemaker acceptable function continue monitoring    Hypertension acceptable control lisinopril    Dyslipidemia on statin therapy    End-stage COPD oxygen dependent counseled regarding avoidance of infectious environments        Crystal, " Miguel JOEL MD, thank you for referring Mr. Pena for evaluation.  I have forwarded my electronically generated recommendations to you for review.  Please do not hesitate to call with any questions.      Ulysses Bass MD, FACC

## 2021-06-22 NOTE — TELEPHONE ENCOUNTER
Contacted Gala and she states patient is required to have COVID testing done before procedure on 07/06/2021. Gala stated that patient is having trouble getting COVID test done and she is requesting our office find a location and contact patient. I stated that per e-mail from [facility], patient would be able to have testing completed at drive-thru on Friday (which is the preferred collection day for a Tuesday procedure) and I could let patient know.   Gala stated that patient could not have sample collected prior to Monday and she need PCP's office to find a location for patient. I tried to explain that Dr. Tinsley would need to order COVID test and then I would try to contact patient to let him know where to have completed.     I have spoken with my clinical supervisor in regards to [facility]'s schedule for testing and once confirmed, I will contact patient

## 2021-06-22 NOTE — TELEPHONE ENCOUNTER
Caller: SONIA  WITH [facility]    Relationship: PROVIDER    Best call back number: [phone]    What is the best time to reach you: ANYTIME    Who are you requesting to speak with (clinical staff, provider,  specific staff member): CLINICAL STAFF    Do you know the name of the person who called: SONIA    What was the call regarding: SONIA STATES THAT THE PATIENT NEEDS A COVID TEST BUT HAS MANY RESTRICTION. SONIA STATES THAT SHE TRIED TO HELP THE PATIENT BUT SHE COULD NOT. PATIENT NEEDS A COVID TEST THREE DAYS BEFORE Wednesday 07/06 APPOINTMENT.    Do you require a callback: YES

## 2021-06-23 NOTE — PROGRESS NOTES
DATE OF CONSULTATION: 6/23/2021    REFERRING PHYSICIAN: Miguel Rojas MD    Dear Miguel Aly MD  Thank you for asking for my medical advice on this patient. I saw him in the  [facility] on 6/23/2021    REASON FOR CONSULTATION: Normocytic anemia    HISTORY OF PRESENT ILLNESS: The patient is a very pleasant 71 y.o.  male   who was in his usual state of health until March 2021.  The patient had routine blood work done that revealed anemia.  The patient labs has been monitored over the last 3 months and showed persistent abnormality.  He was referred to me for further recommendations.    SUBJECTIVE: When I saw the patient today he is doing fairly well.  This was incidentally noted on routine blood work no alleviating or exacerbating factors.  Denies any bleeding.  He is here with his daughter.  He is on 60 of oxygen for advanced COPD.    Review of Systems   Constitutional: Negative for activity change, appetite change, chills, fatigue, fever and unexpected weight change.   HENT: Negative for hearing loss, mouth sores, nosebleeds, sore throat and trouble swallowing.    Eyes: Negative for visual disturbance.   Respiratory: Negative for cough, chest tightness, shortness of breath and wheezing.    Cardiovascular: Negative for chest pain, palpitations and leg swelling.   Gastrointestinal: Negative for abdominal distention, abdominal pain, blood in stool, constipation, diarrhea, nausea, rectal pain and vomiting.   Endocrine: Negative for cold intolerance and heat intolerance.   Genitourinary: Negative for difficulty urinating, dysuria, frequency and urgency.   Musculoskeletal: Negative for arthralgias, back pain, gait problem, joint swelling and myalgias.   Skin: Negative for rash.   Neurological: Negative for dizziness, tremors, syncope, weakness, light-headedness, numbness and headaches.   Hematological: Negative for adenopathy. Does not bruise/bleed easily.   Psychiatric/Behavioral: Negative for confusion,  sleep disturbance and suicidal ideas. The patient is not nervous/anxious.        Past Medical History:   Diagnosis Date   • Abnormal liver enzymes    • Anemia    • Anxiety    • Arthritis    • Atherosclerotic heart disease of native coronary artery without angina pectoris    • Atrial fibrillation (CMS/HCC)    • Back pain    • BPH (benign prostatic hyperplasia)    • Cataract, bilateral    • Chest pain     2-3 MONTHS AGO.  PER PT, HAS ADDRESSED WITH CARDIOLOGIST.  STATES HAS NTG PRN.   • CHF (congestive heart failure) (CMS/HCC)    • Chronic bronchitis (CMS/HCC)    • Chronic kidney disease    • COPD (chronic obstructive pulmonary disease) (CMS/HCC)    • Degeneration of cervical intervertebral disc    • Depression    • Diverticulosis    • Dyspnea    • Esophageal reflux    • Esophagitis    • Gastritis    • Hematuria    • Hemorrhoids    • Hiatal hernia    • History of arthritis    • History of nuclear stress test     UNSURE OF DATE   • History of peripheral neuropathy    • History of ventricular septal defect    • History of ventricular septal defect    • Hyperlipidemia    • Hypertension    • Impaired functional mobility, balance, gait, and endurance    • Infectious diarrhea    • Kyphosis, acquired    • Lumbar radiculopathy    • Mitral and aortic valve disease    • Nephrolithiasis    • Phimosis    • Problems with swallowing     WITH FOOD   • Respiratory failure (CMS/HCC)    • Sleep apnea     BIPAP, too awkward to bring   • TIA (transient ischemic attack)     X3.   2014   • Ventral hernia    • Wears glasses     FOR READING       Social History     Socioeconomic History   • Marital status:      Spouse name: Not on file   • Number of children: Not on file   • Years of education: Not on file   • Highest education level: Not on file   Tobacco Use   • Smoking status: Former Smoker     Packs/day: 3.00     Years: 45.00     Pack years: 135.00     Types: Cigarettes     Quit date:      Years since quittin.4   •  Smokeless tobacco: Never Used   Vaping Use   • Vaping Use: Never used   Substance and Sexual Activity   • Alcohol use: No   • Drug use: No   • Sexual activity: Defer       Family History   Problem Relation Age of Onset   • Other Father         CABG   • Coronary artery disease Father    • Colon cancer Neg Hx        Past Surgical History:   Procedure Laterality Date   • CARDIAC CATHETERIZATION     • CARDIAC CATHETERIZATION N/A 5/24/2018    Procedure: Left Heart Cath;  Surgeon: Edouard Robertson MD;  Location:  [facility];  Service: Cardiovascular   • CARDIAC ELECTROPHYSIOLOGY PROCEDURE N/A 1/31/2019    Procedure: PACEMAKER IMPLANTATION- DC;  Surgeon: Omar Encinas DO;  Location:  [facility] EP [facility]VASIVE [facility];  Service: Cardiology   • COLONOSCOPY N/A 12/28/2019    Procedure: COLONOSCOPY WITH HOT POLYPECTOMY;  Surgeon: Celio Maldonado MD;  Location: [facility] ENDOSCOPY;  Service: Gastroenterology   • ENDOSCOPY N/A 1/26/2019    Procedure: ESOPHAGOGASTRODUODENOSCOPY;  Surgeon: Brunner, Mark I, MD;  Location:  [facility] ENDOSCOPY;  Service: Gastroenterology   • ENDOSCOPY N/A 12/27/2019    Procedure: ESOPHAGOGASTRODUODENOSCOPY;  Surgeon: Valdemar Mcdonald MD;  Location: [facility] ENDOSCOPY;  Service: General   • ENDOSCOPY N/A 10/20/2020    Procedure: ESOPHAGOGASTRODUODENOSCOPY WITH DILATION AND COLD FORCEP BIOPSY;  Surgeon: Cale Madrid MD;  Location: [facility] ENDOSCOPY;  Service: Gastroenterology;  Laterality: N/A;   • HERNIA REPAIR     • ORTHOPEDIC SURGERY Left     Left hip arthroplasty   • PACEMAKER IMPLANTATION  01/31/2019   • SUPRAPUBIC CATHETER INSERTION      History of Percutaneous Catheter Placement Into Ureter   • THROAT SURGERY      FOR SLEEP APNEA   • VSD REPAIR      History of VSD Repair By Patch Closure       Allergies   Allergen Reactions   • Levaquin [Levofloxacin] Hives   • Sulfa Antibiotics Nausea And Vomiting          Current Outpatient Medications:   •  alfuzosin (UROXATRAL) 10 MG 24 hr  tablet, @@ TAKE ONE TABLET BY MOUTH DAILY, Disp: 90 tablet, Rfl: 3  •  Aspirin Low Dose 81 MG chewable tablet, @@ TAKE ONE TABLET BY MOUTH DAILY, Disp: 90 tablet, Rfl: 3  •  atorvastatin (LIPITOR) 10 MG tablet, TAKE 1 TABLET BY MOUTH EVERY EVENING, Disp: 90 tablet, Rfl: 3  •  clopidogrel (PLAVIX) 75 MG tablet, TAKE ONE TABLET BY MOUTH DAILY, Disp: 90 tablet, Rfl: 3  •  Acetaminophen Extra Strength 500 MG tablet, TAKE 1 TABLET BY MOUTH EVERY 6 HOURS AS NEEDED FOR MILD PAIN, Disp: 180 tablet, Rfl: 3  •  ALPRAZolam (XANAX) 0.25 MG tablet, Take 1 tablet by mouth 2 (Two) Times a Day As Needed (anxiety)., Disp: 30 tablet, Rfl: 1  •  benzonatate (TESSALON) 200 MG capsule, Take 1 capsule by mouth 3 (Three) Times a Day As Needed for Cough., Disp: 45 capsule, Rfl: 5  •  carboxymethylcellulose (REFRESH PLUS) 0.5 % solution, 1 drop 3 (Three) Times a Day As Needed for Dry Eyes., Disp: , Rfl:   •  cyclobenzaprine (FLEXERIL) 5 MG tablet, Take 5 mg by mouth 3 (Three) Times a Day As Needed for Muscle Spasms., Disp: , Rfl:   •  diphenhydrAMINE-zinc acetate 2-0.1 % cream, Apply  topically to the appropriate area as directed 3 (Three) Times a Day As Needed for Itching., Disp: , Rfl:   •  docusate sodium (COLACE) 100 MG capsule, Take 1 capsule by mouth 2 (Two) Times a Day As Needed for Constipation., Disp: 100 capsule, Rfl: 3  •  escitalopram (LEXAPRO) 20 MG tablet, @@ TAKE ONE TABLET BY MOUTH DAILY, Disp: 90 tablet, Rfl: 3  •  famotidine (PEPCID) 40 MG tablet, TAKE ONE TABLET BY MOUTH EVERY DAY, Disp: 90 tablet, Rfl: 3  •  finasteride (PROSCAR) 5 MG tablet, TAKE ONE TABLET BY MOUTH DAILY, Disp: 90 tablet, Rfl: 3  •  fluocinonide (LIDEX) 0.05 % cream, Apply  topically to the appropriate area as directed 2 (Two) Times a Day., Disp: , Rfl:   •  furosemide (LASIX) 20 MG tablet, TAKE ONE TABLET BY MOUTH DAILY, Disp: 90 tablet, Rfl: 3  •  HYDROcodone-acetaminophen (NORCO) 5-325 MG per tablet, TAKE 1 TABLET BY MOUTH EVERY 8 HOURS AS NEEDED  FOR SEVERE PAIN, Disp: 45 tablet, Rfl: 0  •  hydrocortisone 1 % cream, Apply 1 application topically to the appropriate area as directed 2 (Two) Times a Day., Disp: , Rfl:   •  ipratropium (Atrovent) 0.06 % nasal spray, 2 sprays into the nostril(s) as directed by provider 4 (Four) Times a Day., Disp: 15 mL, Rfl: 1  •  ipratropium-albuterol (DUO-NEB) 0.5-2.5 mg/3 ml nebulizer, Take 3 mL by nebulization Every 4 (Four) Hours As Needed for Wheezing., Disp: 360 mL, Rfl: 6  •  lidocaine (LIDODERM) 5 %, Place 1 patch on the skin as directed by provider Daily. Remove & Discard patch within 12 hours or as directed by MD, Disp: 30 patch, Rfl: 3  •  magnesium citrate 1.745 GM/30ML solution solution, Take 15 mL by mouth Daily As Needed (constipation)., Disp: , Rfl:   •  melatonin 5 MG tablet tablet, @@ TAKE 1 TABLET BY MOUTH EVERY EVENING, Disp: 90 tablet, Rfl: 3  •  mirtazapine (REMERON) 15 MG tablet, TAKE 1 TABLET BY MOUTH DAILY AT BEDTIME, Disp: 90 tablet, Rfl: 3  •  O2 (OXYGEN), Inhale 4 L/min 1 (One) Time. Walking on 4L and sitting 3L, Disp: , Rfl:   •  oxybutynin (DITROPAN) 5 MG tablet, TAKE 1 TABLET BY MOUTH DAILY AT BEDTIME, Disp: 90 tablet, Rfl: 3  •  pantoprazole (PROTONIX) 40 MG EC tablet, TAKE ONE TABLET BY MOUTH DAILY, Disp: 90 tablet, Rfl: 3  •  polyethylene glycol (MIRALAX) 17 g packet, Take 17 g by mouth 2 (Two) Times a Day., Disp: 30 each, Rfl: 0  •  potassium chloride 10 MEQ CR tablet, TAKE ONE TABLET BY MOUTH DAILY, Disp: 90 tablet, Rfl: 3  •  predniSONE (DELTASONE) 10 MG tablet, TAKE ONE TABLET BY MOUTH DAILY, Disp: 90 tablet, Rfl: 3  •  promethazine (PHENERGAN) 25 MG tablet, TAKE 1 TABLET BY MOUTH EVERY 6 HOURS AS NEEDED FOR NAUSEA AND VOMITING, Disp: 15 tablet, Rfl: 0  •  sodium chloride 0.65 % nasal spray, 1 spray into the nostril(s) as directed by provider As Needed for Congestion., Disp: , Rfl:   •  Stimulant Laxative 8.6-50 MG per tablet, @@TAKE TWO TABLETS BY MOUTH TWO TIMES A DAY, Disp: 360 tablet,  "Rfl: 3  •  traZODone (DESYREL) 50 MG tablet, TAKE 1 TABLET BY MOUTH EVERY EVENING, Disp: 90 tablet, Rfl: 3  •  Trelegy Ellipta 100-62.5-25 MCG/INH inhaler, INHALE ONE PUFF BY MOUTH EVERY DAY, Disp: 60 each, Rfl: 3    PHYSICAL EXAMINATION:   /69   Pulse 92   Temp 97.3 °F (36.3 °C) (Temporal)   Resp 16   Ht 172.7 cm (68\")   Wt 78 kg (172 lb)   SpO2 94%   BMI 26.15 kg/m²   Pain Score    06/23/21 1101   PainSc: 0-No pain                   ECOG Performance Status: 1 - Symptomatic but completely ambulatory  General Appearance:  alert, cooperative, no apparent distress and appears stated age   Neurologic/Psychiatric: A&O x 3, gait steady, appropriate affect, strength 5/5 in all muscle groups   HEENT:  Normocephalic, without obvious abnormality, mucous membranes moist   Neck: Supple, symmetrical, trachea midline, no adenopathy;  No thyromegaly, masses, or tenderness   Lungs:   Clear to auscultation bilaterally; respirations regular, even, and unlabored bilaterally   Heart:  Regular rate and rhythm, no murmurs appreciated   Abdomen:   Soft, non-tender, non-distended and no organomegaly   Lymph nodes: No cervical, supraclavicular, inguinal or axillary adenopathy noted   Extremities: Normal, atraumatic; no clubbing, cyanosis, or edema    Skin: No rashes, ulcers, or suspicious lesions noted       No visits with results within 2 Week(s) from this visit.   Latest known visit with results is:   Orders Only on 05/28/2021   Component Date Value Ref Range Status   • TIBC 05/28/2021 291  250 - 450 ug/dL Final   • UIBC 05/28/2021 251  111 - 343 ug/dL Final   • Iron 05/28/2021 40  38 - 169 ug/dL Final   • Iron Saturation 05/28/2021 14* 15 - 55 % Final   • Folate 05/28/2021 12.0  >3.0 ng/mL Final    Comment: A serum folate concentration of less than 3.1 ng/mL is  considered to represent clinical deficiency.     • Vitamin B-12 05/28/2021 660  232 - 1,245 pg/mL Final   • Ferritin 05/28/2021 198  30 - 400 ng/mL Final   • " Reticulocyte Absolute 05/28/2021 2.6  0.6 - 2.6 % Final        No results found.      DIAGNOSTIC DATA:   1. Radiology:    PROCEDURE: DEXA BONE DENSITY AXIAL-     HISTORY: Chronic steroid use; J44.9-Chronic obstructive pulmonary  disease, unspecified; Z79.52-Long term (current) use of systemic  steroids     Comparison: 08/16/2018.     FINDINGS: Bone densitometry calculations of the lumbar spine and distal  right radius were obtained.     Average BMD for the lumbar spine from L1-L4 is 1.331 g/sq cm.  T-score is 1.3.  Z-score is 2.5.        The distal right radial BMD is 0.447 g/sq cm.  T-score is 1.9.  Z score is 3.8.        IMPRESSION:  The bone mineral densities within the lumbar spine and distal right  radius are within the range of normal.     This report was finalized on 4/19/2021 8:55 AM by Richelle Patel M.D..    2. Dr. Rojas's note reviewed by me and documented in the  chart.   3. Pathology report: EGD with biopsy October 20, 2020 no evidence of malignancy reactive changes.  4. Laboratory data: As above    ASSESSMENT: The patient is a very pleasant 71 y.o.  male  with anemia    PROBLEM LIST:   1.  Anemia  2.  Borderline macrocytosis  3.  Chronic kidney disease stage IIIb  4.  Hypertension  5.  Anxiety  6.  Hypercholesteremia  7.  Advanced COPD    PLAN:   1. I had a long discussion today with the patient and his daughter about his  new diagnosis of anemia. I did go over the final pathology report in  detail with both of them. I reviewed the patient's documents including refereing provider's notes, lab results, imaging, and path report.   2.  The patient had iron profile as well as vitamin levels checked it looked adequate.  3.  I will check serum protein electrophoresis to rule out the possibility of monoclonal gammopathy.  4.  The patient will benefit from recumbent erythropoietin replacement if his hemoglobin drops below 10.  5.  I am suspecting his anemia is mainly driven by chronic kidney disease.  6. He  will continue oxygen replacement for advanced COPD.  7.  He will follow-up with me in 6-month with CBC and SPEP.    Nallely San MD  6/23/2021

## 2021-06-24 NOTE — TELEPHONE ENCOUNTER
Called and spoke with Gala. Gala stated the covid test is ordered by dr Dwight hooper. She stated the only issue now is for the patient to be instructed where to go get the test. Since the order is in Epic the patient can go [facility] on Saturday 07/03/2021 from 8-12noon

## 2021-06-24 NOTE — TELEPHONE ENCOUNTER
Attempted to contact patient; left detailed message per release notifying patient that order had been placed and he could go to [facility] on Saturday before procedure to have COVID swab completed.

## 2021-07-06 NOTE — NURSING NOTE
Did very abbreviated study because of strong concern for aspiration. Spoke with Leilani who is one of the directors at [facility] and expressed the need for nutritional consult or speech therapy to determine type of diet patient needs.

## 2021-07-07 PROBLEM — Z79.52 CURRENT CHRONIC USE OF SYSTEMIC STEROIDS: Status: RESOLVED | Noted: 2021-01-01 | Resolved: 2021-01-01

## 2021-07-07 PROBLEM — H61.22 IMPACTED CERUMEN OF LEFT EAR: Status: RESOLVED | Noted: 2021-01-01 | Resolved: 2021-01-01

## 2021-07-07 PROBLEM — R00.2 PALPITATION: Status: ACTIVE | Noted: 2021-01-01

## 2021-07-07 PROBLEM — Z00.00 WELLNESS EXAMINATION: Status: RESOLVED | Noted: 2021-01-01 | Resolved: 2021-01-01

## 2021-07-07 NOTE — PROGRESS NOTES
Subjective   Jani Pena is a 71 y.o. male.     Chief Complaint   Patient presents with   • Follow-up   • Palpitations       History of Present Illness   Patient here for follow-up.  Recent development of swallow problem patient was diagnosed aspiration.  Patient is in need of thickened liquid.  Anemia seen by hematologist diagnosed anemia of chronic disease due to CKD.  Creatinine stable now.  Dysphagia partially also due to presbyesophagus.  Hyperlipidemia stable now.  Steroid-dependent COPD stable.  Bone density scan showed normal anxiety stable on medication.  Palpitation improved after medication.  No current outpatient medications on file.    The following portions of the patient's history were reviewed and updated as appropriate: allergies, current medications, past family history, past medical history, past social history, past surgical history and problem list.    Review of Systems   Constitutional: Negative.    HENT: Negative.    Eyes: Negative.    Respiratory: Positive for shortness of breath.    Cardiovascular: Negative.    Gastrointestinal: Negative.    Endocrine: Negative.    Genitourinary: Negative.    Musculoskeletal: Negative.    Skin: Negative.    Allergic/Immunologic: Negative.    Neurological: Negative.    Hematological: Negative.    Psychiatric/Behavioral: Negative.    All other systems reviewed and are negative.      Objective   Physical Exam  Cardiovascular:      Rate and Rhythm: Normal rate and regular rhythm.      Heart sounds: Normal heart sounds.   Pulmonary:      Effort: Pulmonary effort is normal.      Breath sounds: Normal breath sounds.   Abdominal:      General: Bowel sounds are normal.   Musculoskeletal:      Cervical back: Neck supple.   Skin:     General: Skin is warm.   Neurological:      Mental Status: He is alert and oriented to person, place, and time.         All tests have been reviewed.    Assessment/Plan   Diagnoses and all orders for this visit:    Mixed  hyperlipidemia continue medication    Esophageal dysphagia thickened liquids    Chronic kidney disease, continue to watch    Anemia, due to chronic disease and CKD follow-up with hematology    Steroid-dependent COPD (CMS/HCC) bone density scan normal continue medication.    Anxiety continue medication as needed    Palpitation improved after diltiazem    3 months follow-up                 ----Below is to historical records for reference only:   Chronic diastolic congestive heart failure (CMS/HCC) stable now  Coronary artery disease cath negative  SSS, s/p Cardiac pacemaker in situ follow cardio  Steroid-dependent COPD (CMS/HCC),  continue present mx recent exacerbation, improved after medication.   Esophageal dysphagia, s/p dilation, still dysphagia. GERD without esophagitis, UGI NSD,  follow up with GI,   osteopenia, oscal D 500mg bid continue  left low back pain resolved follow up with ortho s/p left hip surgery for severe OA doing well.   divertic hemorroid on colon 1/2013  DM stable without med  phimosis seen by uro declined surgery  Hematuria followup with urologist s/p cystoscope bladder stones  BPH enlarged on CT scan follow up with urologist.  Anemia workup all negative.? anemia of chronic disease due to chronic disease   History of the CAD atrial fibrillation VSD status post occlusion stable now. Status post a defibrillator seen by cardio follow up with cardio,   History of a TIA patient is on Plavix continue  pneumovax done, prevnar 13 1/19/17, Td to HD.flushot done, shingrix informed  Depression anxiety cont lexapro to 20 daily and cont xanax prn  right inquinal hernia decline surgery now, call if worse  BLE edema mild, watch for now, cut down salt, avoid soda  Gall stone without sx   CKD follow up with renal

## 2021-07-12 NOTE — TELEPHONE ENCOUNTER
Contacted Snow with [facility] and she was needing clarification on diet. Patient was ordered to have thickened liquids and should be on a mechanical soft diet and rehab staff states patient is still aspirating on mechanical soft. It was advised that patient go to puree food until able to have mechanical soft without aspiration.     Contacted patient and explained change in diet and he understands

## 2021-07-12 NOTE — TELEPHONE ENCOUNTER
joesph wolff with [facility] left vm on referral line that she needs clear orders on diet recommendations from dr hopkins.

## 2021-07-13 NOTE — TELEPHONE ENCOUNTER
Provider: RENNY CHOUDHARY  Caller: CHAN  Relationship to Patient: LPN  Phone Number:[phone]  Reason for Call: CHAN FROM [facility] CALLED AND STATED THAT MR SPEAR HAS SOME LOWER EXTREMITY SWELLING  AND THEY WOULD LIKE TO KNOW IF THE DOCTOR KENRICK  HAS ANY  RECOMMENDATIONS. PLEASE GIVE A CALL BACK

## 2021-07-15 NOTE — TELEPHONE ENCOUNTER
You do not have any openings for several weeks do you want to work him in a new patient spot or hospital follow up spot next week?

## 2021-07-19 PROBLEM — R60.0 BILATERAL LEG EDEMA: Status: ACTIVE | Noted: 2021-01-01

## 2021-07-19 NOTE — PROGRESS NOTES
Subjective   Jani Pena is a 71 y.o. male.     Chief Complaint   Patient presents with   • Follow-up     recent lab results   • Difficulty Swallowing   • Shortness of Breath     symptoms worsening       History of Present Illness   Patient here for follow-up.  Patient complains of lower extremity edema started last week today is somewhat better but patient still gained 5 pound.  May be due to recent increase use of salty foods.  Patient does have swallow problem esophageal pressure monitoring showed patient might have achalasia.  Severe COPD patient is on inhaler and oxygen 6 L still exertional short of breath.  Echocardiogram had a congestive heart failure ejection fraction 25%.  Palpitation patient was put on diltiazem recently the dosage increased due to tachycardia.  Diltiazem 240 daily now.  Heart rate 75 today.  Patient also has a chronic kidney disease resulted in anemia.  Patient is seeing hematologist possible erythropoietin injection if hemoglobin is less than 10.    Current Outpatient Medications:   •  Acetaminophen Extra Strength 500 MG tablet, TAKE 1 TABLET BY MOUTH EVERY 6 HOURS AS NEEDED FOR MILD PAIN, Disp: 180 tablet, Rfl: 3  •  alfuzosin (UROXATRAL) 10 MG 24 hr tablet, @@ TAKE ONE TABLET BY MOUTH DAILY, Disp: 90 tablet, Rfl: 3  •  ALPRAZolam (XANAX) 0.25 MG tablet, Take 1 tablet by mouth 2 (Two) Times a Day As Needed (anxiety)., Disp: 30 tablet, Rfl: 1  •  Aspirin Low Dose 81 MG chewable tablet, @@ TAKE ONE TABLET BY MOUTH DAILY, Disp: 90 tablet, Rfl: 3  •  atorvastatin (LIPITOR) 10 MG tablet, TAKE 1 TABLET BY MOUTH EVERY EVENING, Disp: 90 tablet, Rfl: 3  •  benzonatate (TESSALON) 200 MG capsule, Take 1 capsule by mouth 3 (Three) Times a Day As Needed for Cough., Disp: 45 capsule, Rfl: 5  •  carboxymethylcellulose (REFRESH PLUS) 0.5 % solution, 1 drop 3 (Three) Times a Day As Needed for Dry Eyes., Disp: , Rfl:   •  clopidogrel (PLAVIX) 75 MG tablet, TAKE ONE TABLET BY MOUTH DAILY, Disp: 90  tablet, Rfl: 3  •  cyclobenzaprine (FLEXERIL) 5 MG tablet, Take 5 mg by mouth 3 (Three) Times a Day As Needed for Muscle Spasms., Disp: , Rfl:   •  dilTIAZem CD (CARDIZEM CD) 240 MG 24 hr capsule, Take 240 mg by mouth Daily., Disp: , Rfl:   •  diphenhydrAMINE-zinc acetate 2-0.1 % cream, Apply  topically to the appropriate area as directed 3 (Three) Times a Day As Needed for Itching., Disp: , Rfl:   •  docusate sodium (COLACE) 100 MG capsule, Take 1 capsule by mouth 2 (Two) Times a Day As Needed for Constipation., Disp: 100 capsule, Rfl: 3  •  escitalopram (LEXAPRO) 20 MG tablet, @@ TAKE ONE TABLET BY MOUTH DAILY, Disp: 90 tablet, Rfl: 3  •  famotidine (PEPCID) 40 MG tablet, TAKE ONE TABLET BY MOUTH EVERY DAY, Disp: 90 tablet, Rfl: 3  •  ferrous sulfate 325 (65 FE) MG tablet, Take 325 mg by mouth Daily With Breakfast., Disp: , Rfl:   •  finasteride (PROSCAR) 5 MG tablet, TAKE ONE TABLET BY MOUTH DAILY, Disp: 90 tablet, Rfl: 3  •  fluocinonide (LIDEX) 0.05 % cream, Apply  topically to the appropriate area as directed 2 (Two) Times a Day., Disp: , Rfl:   •  furosemide (LASIX) 20 MG tablet, TAKE ONE TABLET BY MOUTH DAILY, Disp: 90 tablet, Rfl: 3  •  hydrocortisone 1 % cream, Apply 1 application topically to the appropriate area as directed 2 (Two) Times a Day., Disp: , Rfl:   •  ipratropium (Atrovent) 0.06 % nasal spray, 2 sprays into the nostril(s) as directed by provider 4 (Four) Times a Day., Disp: 15 mL, Rfl: 1  •  ipratropium-albuterol (DUO-NEB) 0.5-2.5 mg/3 ml nebulizer, Take 3 mL by nebulization Every 4 (Four) Hours As Needed for Wheezing., Disp: 360 mL, Rfl: 6  •  lidocaine (LIDODERM) 5 %, Place 1 patch on the skin as directed by provider Daily. Remove & Discard patch within 12 hours or as directed by MD, Disp: 30 patch, Rfl: 3  •  loperamide (IMODIUM) 2 MG capsule, Take 2 mg by mouth 4 (Four) Times a Day As Needed for Diarrhea., Disp: , Rfl:   •  magnesium citrate 1.745 GM/30ML solution solution, Take 15 mL by  mouth Daily As Needed (constipation)., Disp: , Rfl:   •  melatonin 5 MG tablet tablet, @@ TAKE 1 TABLET BY MOUTH EVERY EVENING, Disp: 90 tablet, Rfl: 3  •  mirtazapine (REMERON) 15 MG tablet, TAKE 1 TABLET BY MOUTH DAILY AT BEDTIME, Disp: 90 tablet, Rfl: 3  •  O2 (OXYGEN), Inhale 4 L/min 1 (One) Time. Walking on 4L and sitting 3L, Disp: , Rfl:   •  oxybutynin (DITROPAN) 5 MG tablet, TAKE 1 TABLET BY MOUTH DAILY AT BEDTIME, Disp: 90 tablet, Rfl: 3  •  pantoprazole (PROTONIX) 40 MG EC tablet, TAKE ONE TABLET BY MOUTH DAILY, Disp: 90 tablet, Rfl: 3  •  polyethylene glycol (MIRALAX) 17 g packet, Take 17 g by mouth 2 (Two) Times a Day., Disp: 30 each, Rfl: 0  •  potassium chloride 10 MEQ CR tablet, TAKE ONE TABLET BY MOUTH DAILY, Disp: 90 tablet, Rfl: 3  •  promethazine (PHENERGAN) 25 MG tablet, TAKE 1 TABLET BY MOUTH EVERY 6 HOURS AS NEEDED FOR NAUSEA AND VOMITING, Disp: 15 tablet, Rfl: 0  •  Stimulant Laxative 8.6-50 MG per tablet, @@TAKE TWO TABLETS BY MOUTH TWO TIMES A DAY, Disp: 360 tablet, Rfl: 3  •  traZODone (DESYREL) 50 MG tablet, TAKE 1 TABLET BY MOUTH EVERY EVENING, Disp: 90 tablet, Rfl: 3  •  Trelegy Ellipta 100-62.5-25 MCG/INH inhaler, INHALE ONE PUFF BY MOUTH EVERY DAY, Disp: 60 each, Rfl: 3  •  predniSONE (DELTASONE) 10 MG tablet, TAKE ONE TABLET BY MOUTH DAILY, Disp: 90 tablet, Rfl: 3  •  sodium chloride 0.65 % nasal spray, 1 spray into the nostril(s) as directed by provider As Needed for Congestion., Disp: , Rfl:     The following portions of the patient's history were reviewed and updated as appropriate: allergies, current medications, past family history, past medical history, past social history, past surgical history and problem list.    Review of Systems   Constitutional: Negative.    Respiratory: Positive for choking and shortness of breath.    Cardiovascular: Positive for leg swelling.   Gastrointestinal: Negative.    Musculoskeletal: Negative.    Skin: Negative.    Neurological: Negative.     Psychiatric/Behavioral: Negative.        Objective   Physical Exam  Cardiovascular:      Rate and Rhythm: Normal rate and regular rhythm.      Heart sounds: Normal heart sounds.   Pulmonary:      Effort: Pulmonary effort is normal.      Breath sounds: Normal breath sounds.   Abdominal:      General: Bowel sounds are normal.   Musculoskeletal:      Cervical back: Neck supple.   Skin:     General: Skin is warm.   Neurological:      Mental Status: He is alert and oriented to person, place, and time.         All tests have been reviewed.    Assessment/Plan   Diagnoses and all orders for this visit:    Palpitation improved after diltiazem rates to 2 240 mg daily by cardiologist    Sick sinus syndrome (CMS/HCC) follow-up with cardiology    Coronary artery disease involving native coronary artery of native heart with angina pectoris (CMS/[facility])    Other dysphagia possible achalasia related patient is on puréed diet need to follow-up with gastroenterologist    Hiatal hernia    Chronic kidney disease, unspecified CKD stage continue to watch    Anemia, due to chronic kidney disease if lower than hemoglobin 10 patient needs to have a erythropoietin injection recommended by hematologist    Chronic respiratory failure with hypoxia and hypercapnia (CMS/[facility]) follow-up with pulmonology cont E scooter    Bilateral leg edema elevate legs cutdown salt compression hose

## 2021-07-20 PROBLEM — J96.22 ACUTE ON CHRONIC RESPIRATORY FAILURE WITH HYPOXIA AND HYPERCAPNIA (HCC): Status: ACTIVE | Noted: 2021-01-01

## 2021-07-20 PROBLEM — J96.21 ACUTE ON CHRONIC RESPIRATORY FAILURE WITH HYPOXIA AND HYPERCAPNIA (HCC): Status: ACTIVE | Noted: 2021-01-01

## 2021-07-20 NOTE — H&P
[facility] HOSPITALIST   HISTORY AND PHYSICAL      Name:  Jani Pena   Age:  71 y.o.  Sex:  male  :  1950  MRN:  [id number]   Visit Number:  [id number]  Admission Date:  2021  Date Of Service:  21  Primary Care Physician:  Miguel Rojas MD    Chief Complaint:     Shortness of breath    History Of Presenting Illness:      Patient is a 71 year old male who presented to the Emergency Department today with complaints of shortness of breath and increased leg swelling.  Patient was brought in by EMS after being found to have oxygen saturations in the 70s on home oxygen at 6L.  Patient with health history significant for chronic respiratory failure requiring 6L of oxygen all the time at baseline, COPD, CKD, Atrial Fibrillation, Chronic Diastolic Heart Failure, Anxiety, GERD, PEDRO on home Cpap, TIA, and Pacemaker placement.  Patient is a resident at the [facility] living [facility].  Patient reports that he began having increasing shortness of breath for the past 3-4 days with increased leg swelling as well.  Denies any fevers, change in chronic cough, denies feeling sick recently, no sick contacts, and has been fully vaccinated for Covid 19.      Work up in the Emergency Department revealed proBNP-8311, troponin-0.050, CO2-35.5, creatinine-1.65 with baseline 2.10 noted May 2021, WBC-10.09, hemoglobin-9.6, hematocrit-32.8, GFR-41, and ABG noted pH-7.328, pCO2-71, pO2-86.9, HCO3-37.2, base excess-9.4 on bipap.  CXR noted cardiomegaly with interstitial edema and small effusions.  Patient responded well to Bipap therapy.  Was given magnesium, lasix, and solu-medrol IV in the ED.  Hospitalist team was called for admission due to respiratory failure.    Review Of Systems:    All systems were reviewed and negative except for: Shortness of breath    Past Medical History: Patient  has a past medical history of Abnormal liver enzymes, Anemia, Anxiety, Arthritis, Atherosclerotic heart  disease of native coronary artery without angina pectoris, Atrial fibrillation (CMS/[facility]), Back pain, BPH (benign prostatic hyperplasia), Cataract, bilateral, Chest pain, CHF (congestive heart failure) (CMS/[facility]), Chronic bronchitis (CMS/[facility]), Chronic kidney disease, COPD (chronic obstructive pulmonary disease) (CMS/[facility]), Degeneration of cervical intervertebral disc, Depression, Diverticulosis, Dyspnea, Esophageal reflux, Esophagitis, Gastritis, Hematuria, Hemorrhoids, Hiatal hernia, History of arthritis, History of nuclear stress test, History of peripheral neuropathy, History of ventricular septal defect, History of ventricular septal defect, Hyperlipidemia, Hypertension, Impaired functional mobility, balance, gait, and endurance, Infectious diarrhea, Kyphosis, acquired, Lumbar radiculopathy, Mitral and aortic valve disease, Nephrolithiasis, Phimosis, Problems with swallowing, Respiratory failure (CMS/[facility]), Sleep apnea, TIA (transient ischemic attack), Ventral hernia, and Wears glasses.    Past Surgical History: Patient  has a past surgical history that includes Hernia repair; Suprapubic catheter insertion; Throat surgery; VSD repair; orthopedic surgery (Left); Esophagogastroduodenoscopy (N/A, 1/26/2019); Cardiac electrophysiology procedure (N/A, 1/31/2019); Pacemaker Implantation (01/31/2019); Colonoscopy (N/A, 12/28/2019); Esophagogastroduodenoscopy (N/A, 12/27/2019); Cardiac catheterization; Cardiac catheterization (N/A, 5/24/2018); Esophagogastroduodenoscopy (N/A, 10/20/2020); and Esophageal motility study (N/A, 7/6/2021).    Social History: Patient  reports that he quit smoking about 6 years ago. His smoking use included cigarettes. He has a 135.00 pack-year smoking history. He has never used smokeless tobacco. He reports that he does not drink alcohol and does not use drugs.    Family History: Patient's family history includes Coronary artery disease in his father; Other in his father.    Allergies:       Levaquin [levofloxacin] and Sulfa antibiotics    Home Medications:    Prior to Admission Medications     Prescriptions Last Dose Informant Patient Reported? Taking?    Acetaminophen Extra Strength 500 MG tablet Unknown  No No    TAKE 1 TABLET BY MOUTH EVERY 6 HOURS AS NEEDED FOR MILD PAIN    alfuzosin (UROXATRAL) 10 MG 24 hr tablet 7/20/2021  No Yes    @@ TAKE ONE TABLET BY MOUTH DAILY    ALPRAZolam (XANAX) 0.25 MG tablet Unknown  No No    Take 1 tablet by mouth 2 (Two) Times a Day As Needed (anxiety).    Aspirin Low Dose 81 MG chewable tablet 7/20/2021  No Yes    @@ TAKE ONE TABLET BY MOUTH DAILY    atorvastatin (LIPITOR) 10 MG tablet 7/19/2021  No Yes    TAKE 1 TABLET BY MOUTH EVERY EVENING    benzonatate (TESSALON) 200 MG capsule Unknown  No No    Take 1 capsule by mouth 3 (Three) Times a Day As Needed for Cough.    carboxymethylcellulose (REFRESH PLUS) 0.5 % solution Unknown  Yes No    1 drop 3 (Three) Times a Day As Needed for Dry Eyes.    clopidogrel (PLAVIX) 75 MG tablet 7/20/2021  No Yes    TAKE ONE TABLET BY MOUTH DAILY    cyclobenzaprine (FLEXERIL) 5 MG tablet Unknown  Yes No    Take 5 mg by mouth 3 (Three) Times a Day As Needed for Muscle Spasms.    dilTIAZem CD (CARDIZEM CD) 240 MG 24 hr capsule 7/20/2021  No Yes    TAKE ONE CAPSULE BY MOUTH DAILY    diphenhydrAMINE-zinc acetate 2-0.1 % cream Unknown  Yes No    Apply  topically to the appropriate area as directed 3 (Three) Times a Day As Needed for Itching.    docusate sodium (COLACE) 100 MG capsule Unknown  No No    Take 1 capsule by mouth 2 (Two) Times a Day As Needed for Constipation.    escitalopram (LEXAPRO) 20 MG tablet 7/20/2021  No Yes    @@ TAKE ONE TABLET BY MOUTH DAILY    famotidine (PEPCID) 40 MG tablet 7/20/2021  No Yes    TAKE ONE TABLET BY MOUTH EVERY DAY    ferrous sulfate 325 (65 FE) MG tablet 7/20/2021  Yes Yes    Take 325 mg by mouth Daily With Breakfast.    finasteride (PROSCAR) 5 MG tablet 7/20/2021  No Yes    TAKE ONE TABLET BY  MOUTH DAILY    fluocinonide (LIDEX) 0.05 % cream 7/20/2021  Yes Yes    Apply  topically to the appropriate area as directed 2 (Two) Times a Day.    furosemide (LASIX) 20 MG tablet 7/20/2021  No Yes    TAKE ONE TABLET BY MOUTH DAILY    Patient taking differently:  20 mg 2 (Two) Times a Day.    HYDROcodone-acetaminophen (NORCO) 5-325 MG per tablet Unknown  Yes No    Take 1 tablet by mouth Every 8 (Eight) Hours As Needed for Moderate Pain .    hydrocortisone 1 % cream 7/20/2021 Nursing Home Yes Yes    Apply 1 application topically to the appropriate area as directed 2 (Two) Times a Day.    ipratropium (Atrovent) 0.06 % nasal spray 7/20/2021  No Yes    2 sprays into the nostril(s) as directed by provider 4 (Four) Times a Day.    ipratropium-albuterol (DUO-NEB) 0.5-2.5 mg/3 ml nebulizer Unknown  No No    Take 3 mL by nebulization Every 4 (Four) Hours As Needed for Wheezing.    lidocaine (LIDODERM) 5 % 7/20/2021  No Yes    Place 1 patch on the skin as directed by provider Daily. Remove & Discard patch within 12 hours or as directed by MD    loperamide (IMODIUM) 2 MG capsule Unknown  Yes No    Take 2 mg by mouth 4 (Four) Times a Day As Needed for Diarrhea.    magnesium citrate 1.745 GM/30ML solution solution Unknown  Yes No    Take 15 mL by mouth Daily As Needed (constipation).    melatonin 5 MG tablet tablet 7/19/2021  No Yes    @@ TAKE 1 TABLET BY MOUTH EVERY EVENING    mirtazapine (REMERON) 15 MG tablet 7/19/2021  No Yes    TAKE 1 TABLET BY MOUTH DAILY AT BEDTIME    O2 (OXYGEN) 7/20/2021 Self Yes Yes    Inhale 4 L/min 1 (One) Time. Walking on 4L and sitting 3L    oxybutynin (DITROPAN) 5 MG tablet 7/19/2021  No Yes    TAKE 1 TABLET BY MOUTH DAILY AT BEDTIME    pantoprazole (PROTONIX) 40 MG EC tablet 7/20/2021  No Yes    TAKE ONE TABLET BY MOUTH DAILY    polyethylene glycol (MIRALAX) 17 g packet 7/20/2021  No Yes    Take 17 g by mouth 2 (Two) Times a Day.    potassium chloride 10 MEQ CR tablet 7/20/2021  No Yes    TAKE  ONE TABLET BY MOUTH DAILY    predniSONE (DELTASONE) 10 MG tablet 7/20/2021  No Yes    TAKE ONE TABLET BY MOUTH DAILY    promethazine (PHENERGAN) 25 MG tablet Unknown  No No    TAKE 1 TABLET BY MOUTH EVERY 6 HOURS AS NEEDED FOR NAUSEA AND VOMITING    sodium chloride 0.65 % nasal spray Unknown  Yes No    1 spray into the nostril(s) as directed by provider As Needed for Congestion.    Stimulant Laxative 8.6-50 MG per tablet 7/20/2021  No Yes    @@TAKE TWO TABLETS BY MOUTH TWO TIMES A DAY    traZODone (DESYREL) 50 MG tablet 7/19/2021  No Yes    TAKE 1 TABLET BY MOUTH EVERY EVENING    Trelegy Ellipta 100-62.5-25 MCG/INH inhaler 7/20/2021  No Yes    INHALE ONE PUFF BY MOUTH EVERY DAY        ED Medications:    Medications   ALPRAZolam (XANAX) tablet 0.25 mg (has no administration in time range)   aspirin chewable tablet 81 mg (has no administration in time range)   atorvastatin (LIPITOR) tablet 10 mg (has no administration in time range)   carboxymethylcellulose (REFRESH PLUS) 0.5 % ophthalmic solution 1 drop (has no administration in time range)   clopidogrel (PLAVIX) tablet 75 mg (has no administration in time range)   cyclobenzaprine (FLEXERIL) tablet 5 mg (has no administration in time range)   dilTIAZem CD (CARDIZEM CD) 24 hr capsule 240 mg (has no administration in time range)   escitalopram (LEXAPRO) tablet 20 mg (has no administration in time range)   ferrous sulfate EC tablet 324 mg (has no administration in time range)   finasteride (PROSCAR) tablet 5 mg (has no administration in time range)   HYDROcodone-acetaminophen (NORCO) 5-325 MG per tablet 1 tablet (has no administration in time range)   melatonin tablet 5 mg (has no administration in time range)   oxybutynin (DITROPAN) tablet 5 mg (has no administration in time range)   pantoprazole (PROTONIX) EC tablet 40 mg (has no administration in time range)   predniSONE (DELTASONE) tablet 10 mg (has no administration in time range)   Pharmacy to dose Trelegy (has no  administration in time range)   ipratropium-albuterol (DUO-NEB) nebulizer solution 3 mL (has no administration in time range)   sodium chloride 0.9 % flush 10 mL (has no administration in time range)   sodium chloride 0.9 % flush 10 mL (has no administration in time range)   Pharmacy to Dose enoxaparin (LOVENOX) (has no administration in time range)   sodium chloride 0.9 % flush 10 mL (has no administration in time range)   sennosides-docusate (PERICOLACE) 8.6-50 MG per tablet 2 tablet (has no administration in time range)     And   polyethylene glycol (MIRALAX) packet 17 g (has no administration in time range)     And   bisacodyl (DULCOLAX) EC tablet 5 mg (has no administration in time range)     And   bisacodyl (DULCOLAX) suppository 10 mg (has no administration in time range)   ondansetron (ZOFRAN) tablet 4 mg (has no administration in time range)     Or   ondansetron (ZOFRAN) injection 4 mg (has no administration in time range)   acetaminophen (TYLENOL) tablet 650 mg (has no administration in time range)   mirtazapine (REMERON) tablet 15 mg (has no administration in time range)   traZODone (DESYREL) tablet 50 mg (has no administration in time range)   methylPREDNISolone sodium succinate (SOLU-Medrol) injection 80 mg (80 mg Intravenous Given 7/20/21 1058)   magnesium sulfate 2g/50 mL (PREMIX) infusion (0 g Intravenous Stopped 7/20/21 1220)   furosemide (LASIX) injection 40 mg (40 mg Intravenous Given 7/20/21 1220)     Vital Signs:  Temp:  [98.5 °F (36.9 °C)-98.9 °F (37.2 °C)] 98.5 °F (36.9 °C)  Heart Rate:  [67-73] 67  Resp:  [15-18] 15  BP: (119-130)/(64-91) 130/91        07/20/21  1045   Weight: 77.6 kg (171 lb)     Body mass index is 26 kg/m².    Physical Exam:     General Appearance:  Alert and cooperative, resting in bed on exam, pleasant elderly male.   Head:  Atraumatic and normocephalic.   Eyes: Conjunctivae and sclerae normal, no icterus. No pallor.   Ears:  Ears with no abnormalities noted.   Throat: No  oral lesions, no thrush, oral mucosa moist.   Neck: Supple, trachea midline   Back:   No kyphoscoliosis present. No tenderness to palpation.   Lungs:   Breath sounds heard bilaterally equally but diminished.   Crackles to left base without wheezing appreciated. Mild accessory muscle use.   Heart:  Normal S1 and S2, no murmur, no gallop, no rub. No JVD.   Abdomen:   Normal bowel sounds, no masses, no organomegaly. Soft, nontender, nondistended, no rebound tenderness.   Extremities: Supple, +1 pitting edema to bilateral legs with chronic venous stasis changes, no cyanosis, no clubbing.   Pulses: Pulses palpable bilaterally.   Skin: No bleeding or rash.   Neurologic: Alert and oriented x 3. No facial asymmetry. Moves all four limbs.       Laboratory data:    I have reviewed the labs done in the emergency room.    Results from last 7 days   Lab Units 07/20/21  1055   SODIUM mmol/L 141   POTASSIUM mmol/L 4.9   CHLORIDE mmol/L 98   CO2 mmol/L 35.5*   BUN mg/dL 32*   CREATININE mg/dL 1.65*   CALCIUM mg/dL 9.1   BILIRUBIN mg/dL 0.6   ALK PHOS U/L 87   ALT (SGPT) U/L 19   AST (SGOT) U/L 25   GLUCOSE mg/dL 140*     Results from last 7 days   Lab Units 07/20/21  1055   WBC 10*3/mm3 10.02   HEMOGLOBIN g/dL 9.6*   HEMATOCRIT % 32.8*   PLATELETS 10*3/mm3 186         Results from last 7 days   Lab Units 07/20/21  1055   TROPONIN T ng/mL 0.050*     Results from last 7 days   Lab Units 07/20/21  1055   PROBNP pg/mL 8,311.0*             Results from last 7 days   Lab Units 07/20/21  1059   PH, ARTERIAL pH units 7.328*   PO2 ART mm Hg 86.9   PCO2, ARTERIAL mm Hg 71.0*   HCO3 ART mmol/L 37.2*           Invalid input(s): USDES,  BLOODU, NITRITITE, BACT, EP    Pain Management Panel     Pain Management Panel Latest Ref Rng & Units 9/1/2019 8/29/2019    CREATININE UR mg/dL 90.1 -    AMPHETAMINES SCREEN, URINE Negative - Negative    BARBITURATES SCREEN Negative - Negative    BENZODIAZEPINE SCREEN, URINE Negative - Positive(A)     BUPRENORPHINEUR Negative - Negative    COCAINE SCREEN, URINE Negative - Negative    METHADONE SCREEN, URINE Negative - Negative    METHAMPHETAMINEUR Negative - Negative          EKG:      Venticular paced    Radiology:    XR Chest 1 View    Result Date: 7/20/2021  PROCEDURE: XR CHEST 1 VW-  HISTORY: CHF/COPD Protocol  COMPARISON: 03/18/2021.  FINDINGS: A left subclavian pacemaker is in place. The heart is enlarged. The mediastinum is unremarkable. There is interstitial pulmonary edema and small effusions. There is no pneumothorax.  There are no acute osseous abnormalities.      Cardiomegaly with interstitial edema and small effusions.  Continued followup is recommended.  This report was finalized on 7/20/2021 11:27 AM by Richelle Patel M.D..      Assessment:    Acute on chronic respiratory failure with hypoxia and hypercapnia, POA  Acute on chronic diastolic heart failure, POA  Steroid dependent COPD on chronic home O2  CKD  Anxiety  Atrial Fibrillation  GERD  Depression  PEDRO on Cpap    Plan:    Admit patient to Med/Surg floor with telemetry monitoring continuously.  Continue home medications as reconciled.  Patient tells me he was currently changed to a pureed diet.  Continue Bipap therapy.  Lasix for diuresis 40mg IV every 6 hours x3 doses.  Strict I&Os.  PT consulted.  Lovenox for DVT prophylaxis.  Will continue chronic Prednisone on admission.  Fall precautions.    Repeat basic labs in the morning.  Supplemental oxygen to keep oxygen saturations >90% when not on Bipap.  Patient is a DNI on admission and does not want mechanical ventilation but would like CPR.      Risk Assessment: Moderate due to age and comorbidities  DVT Prophylaxis: Lovenox   Code Status: DNR  Diet: Cardiac; pureed    Advance Care Planning   ACP discussion was held with the patient during this visit. Patient has an advance directive (not in EMR), copy requested.      Anna Piña, APRN  07/20/21  15:38 EDT    Dictated utilizing Dragon  dictation.

## 2021-07-20 NOTE — PLAN OF CARE
Goal Outcome Evaluation:  Plan of Care Reviewed With: patient        Progress: no change  Outcome Summary: Patient having difficulty maintaining sats on NC and is BIPAP dependent att aside from occasional simple mask.  UOP is good att.  PACED on tele.

## 2021-07-20 NOTE — ED PROVIDER NOTES
Subjective   History of Present Illness    Chief Complaint: Shortness of breath  History of Present Illness: 71-year-old male, chronic respiratory failure with 6 L nasal cannula at home, history of COPD CHF A. fib CKD, here with reported sats in the 70s on his baseline nasal cannula oxygen.  Reported home BiPAP admission in March for same.  Onset: Today  Duration: Persist  Exacerbating / Alleviating factors: Home medications without relief  Associated symptoms: None      Nurses Notes reviewed and agree, including vitals, allergies, social history and prior medical history.     REVIEW OF SYSTEMS: All systems reviewed and not pertinent unless noted.    Positive for: Shortness of breath found to be hypoxic    Negative for: Fever cough hemoptysis syncope chest pain palpitation  Review of Systems    Past Medical History:   Diagnosis Date   • Abnormal liver enzymes    • Anemia    • Anxiety    • Arthritis    • Atherosclerotic heart disease of native coronary artery without angina pectoris    • Atrial fibrillation (CMS/HCC)    • Back pain    • BPH (benign prostatic hyperplasia)    • Cataract, bilateral    • Chest pain     2-3 MONTHS AGO.  PER PT, HAS ADDRESSED WITH CARDIOLOGIST.  STATES HAS NTG PRN.   • CHF (congestive heart failure) (CMS/HCC)    • Chronic bronchitis (CMS/HCC)    • Chronic kidney disease    • COPD (chronic obstructive pulmonary disease) (CMS/HCC)    • Degeneration of cervical intervertebral disc    • Depression    • Diverticulosis    • Dyspnea    • Esophageal reflux    • Esophagitis    • Gastritis    • Hematuria    • Hemorrhoids    • Hiatal hernia    • History of arthritis    • History of nuclear stress test     UNSURE OF DATE   • History of peripheral neuropathy    • History of ventricular septal defect    • History of ventricular septal defect    • Hyperlipidemia    • Hypertension    • Impaired functional mobility, balance, gait, and endurance    • Infectious diarrhea    • Kyphosis, acquired    • Lumbar  radiculopathy    • Mitral and aortic valve disease    • Nephrolithiasis    • Phimosis    • Problems with swallowing     WITH FOOD   • Respiratory failure (CMS/HCC)    • Sleep apnea     BIPAP, too awkward to bring   • TIA (transient ischemic attack)     X3.   2014   • Ventral hernia    • Wears glasses     FOR READING       Allergies   Allergen Reactions   • Levaquin [Levofloxacin] Hives   • Sulfa Antibiotics Nausea And Vomiting       Past Surgical History:   Procedure Laterality Date   • CARDIAC CATHETERIZATION     • CARDIAC CATHETERIZATION N/A 5/24/2018    Procedure: Left Heart Cath;  Surgeon: Edouard Robertson MD;  Location:  [facility];  Service: Cardiovascular   • CARDIAC ELECTROPHYSIOLOGY PROCEDURE N/A 1/31/2019    Procedure: PACEMAKER IMPLANTATION- DC;  Surgeon: Omar Encinas DO;  Location:  [facility];  Service: Cardiology   • COLONOSCOPY N/A 12/28/2019    Procedure: COLONOSCOPY WITH HOT POLYPECTOMY;  Surgeon: Celio Maldonado MD;  [facility]  [facility];  Service: Gastroenterology   • ENDOSCOPY N/A 1/26/2019    Procedure: ESOPHAGOGASTRODUODENOSCOPY;  Surgeon: Brunner, Mark I, MD;  Location:  [facility];  Service: Gastroenterology   • ENDOSCOPY N/A 12/27/2019    Procedure: ESOPHAGOGASTRODUODENOSCOPY;  Surgeon: Valdemar Mcdonald MD;  Location:  [facility];  Service: General   • ENDOSCOPY N/A 10/20/2020    Procedure: ESOPHAGOGASTRODUODENOSCOPY WITH DILATION AND COLD FORCEP BIOPSY;  Surgeon: Cale Madrid MD;  Location:  [facility];  Service: Gastroenterology;  Laterality: N/A;   • ESOPHAGEAL MOTILITY STUDY N/A 7/6/2021    Procedure: MANOMETRY;  Surgeon: Uriel Tinsley MD;  Location:  [facility];  Service: Gastroenterology;  Laterality: N/A;   • HERNIA REPAIR     • ORTHOPEDIC SURGERY Left     Left hip arthroplasty   • PACEMAKER IMPLANTATION  01/31/2019   • SUPRAPUBIC CATHETER INSERTION      History of Percutaneous Catheter Placement Into  Ureter   • THROAT SURGERY      FOR SLEEP APNEA   • VSD REPAIR      History of VSD Repair By Patch Closure       Family History   Problem Relation Age of Onset   • Other Father         CABG   • Coronary artery disease Father    • Colon cancer Neg Hx        Social History     Socioeconomic History   • Marital status:      Spouse name: Not on file   • Number of children: Not on file   • Years of education: Not on file   • Highest education level: Not on file   Tobacco Use   • Smoking status: Former Smoker     Packs/day: 3.00     Years: 45.00     Pack years: 135.00     Types: Cigarettes     Quit date:      Years since quittin.5   • Smokeless tobacco: Never Used   Vaping Use   • Vaping Use: Never used   Substance and Sexual Activity   • Alcohol use: No   • Drug use: No   • Sexual activity: Defer           Objective   Physical Exam    CONSTITUTIONAL: Well developed, chronically ill-appearing 71-year-old  male,  in no acute distress.  VITAL SIGNS: per nursing, reviewed and noted  SKIN: Noncellulitic ecchymoses to the abdomen nontender  EYES: perrla. EOMI.  ENT: Normal voice.  Patient maintained wearing a mask throughout patient encounter due to coronavirus pandemic  RESPIRATORY:  No increased work of breathing. No retractions.  Shallow respirations with basilar crackles and wheezes.  CARDIOVASCULAR:  regular rate and rhythm, no murmurs.  Good Peripheral pulses. Good cap refill to extremities.   GI: Abdomen soft, nontender, normal bowel sounds. No hernia. No ascites.  MUSCULOSKELETAL:  No tenderness. Full ROM. Strength and tone grossly normal.  no spasms. no neck or back tenderness or spasm.   NEUROLOGIC: Alert, oriented x 3. No gross deficits. GCS 15.   PSYCH: appropriate affect.  : no bladder tenderness or distention, no CVA tenderness      Critical Care  Performed by: Kyler Rhodaes DO  Authorized by: Kyler Rhoades DO     Critical care provider statement:     Critical care time (minutes):   35    Critical care was necessary to treat or prevent imminent or life-threatening deterioration of the following conditions:  Respiratory failure    Critical care was time spent personally by me on the following activities:  Development of treatment plan with patient or surrogate, discussions with consultants, evaluation of patient's response to treatment, examination of patient, ordering and performing treatments and interventions, ordering and review of laboratory studies, ordering and review of radiographic studies, pulse oximetry, re-evaluation of patient's condition and review of old charts         No attending physician procedures were performed on this patient.    ED Course  ED Course as of Jul 20 1341   Tue Jul 20, 2021   1157 COMPARISON: 03/18/2021.     FINDINGS: A left subclavian pacemaker is in place. The heart is  enlarged. The mediastinum is unremarkable. There is interstitial  pulmonary edema and small effusions. There is no pneumothorax.  There  are no acute osseous abnormalities.     IMPRESSION:  Cardiomegaly with interstitial edema and small effusions.     Continued followup is recommended.     This report was finalized on 7/20/2021 11:27 AM by Richelle Patel M.D..    [PF]   1157 EKG interpreted by me reveals paced rhythm rate 76.    [PF]   1210 Troponin T(!!): 0.050 [PF]   1210 Glucose(!): 140 [PF]   1210 BUN(!): 32 [PF]   1210 Creatinine(!): 1.65 [PF]   1210 Sodium: 141 [PF]   1210 Potassium: 4.9 [PF]   1210 Chloride: 98 [PF]   1210 CO2(!): 35.5 [PF]   1210 Calcium: 9.1 [PF]   1210 Total Protein: 6.6 [PF]   1210 Albumin: 4.00 [PF]   1210 ALT (SGPT): 19 [PF]   1210 AST (SGOT): 25 [PF]   1210 Alkaline Phosphatase: 87 [PF]   1210 Total Bilirubin: 0.6 [PF]   1210 eGFR Non  Am(!): 41 [PF]   1210 pH, Arterial(!): 7.328 [PF]   1210 pCO2, Arterial(!!): 71.0 [PF]   1210 pO2, Arterial: 86.9 [PF]   1210 HCO3, Arterial(!): 37.2 [PF]   1210 Base Excess(!): 9.4 [PF]   1210 Hematocrit, Blood Gas:  30.0 [PF]   1210 Oxyhemoglobin: 94.8 [PF]   1210 Methemoglobin: 1.00 [PF]   1210 Carboxyhemoglobin: 1.4 [PF]   1210 WBC: 10.02 [PF]   1210 Hemoglobin(!): 9.6 [PF]   1210 Hematocrit(!): 32.8 [PF]   1210 MCV(!): 100.9 [PF]   1210 Platelets: 186 [PF]      ED Course User Index  [PF] Kyler Rhoades, DO                                           [facility]  71-year-old male presents with acute on chronic respiratory failure with reported hypoxia with increased oxygen requirement on his baseline home oxygen 6 L nasal cannula.  Patient required BiPAP here.  Work-up suggestive of CHF exacerbation due to vascular congestion and small basilar effusions on chest x-ray.  Patient is otherwise normotensive.  Afebrile.  No tachycardia.  Responded well to BiPAP.  Has CKD is stable.  Treated for potential COPD exacerbation as well.  Discussed with Dr. Alas, will admit telemetry.  Final diagnoses:   Acute on chronic respiratory failure with hypoxia and hypercapnia (CMS/HCC)   Acute on chronic congestive heart failure, unspecified heart failure type (CMS/HCC)   COPD exacerbation (CMS/HCC)       ED Disposition  ED Disposition     ED Disposition Condition Comment    Decision to Admit  Level of Care: Telemetry [5]   Diagnosis: Acute on chronic respiratory failure with hypoxia and hypercapnia (CMS/HCC) [[id number]]   Admitting Physician: TITUS ALAS [[id number]]   Isolate for COVID?: No [0]   Certification: I Certify That Inpatient Hospital Services Are Medically Necessary For Greater Than 2 Midnights            No follow-up provider specified.       Medication List      No changes were made to your prescriptions during this visit.          Kyler Rhoades DO  07/20/21 3099

## 2021-07-21 NOTE — CASE MANAGEMENT/SOCIAL WORK
Contacted Haleigh at [facility] to confirm pt will be able to return there at OR.  She reported if pt stays over 3 nights, he will need to be reassessed for appropriateness of level of care.  She reported to call [facility] to request reassessment.  Haleigh reported pt will probably be able to return.  They will focus on pt being able to transfer himself from .

## 2021-07-21 NOTE — PROGRESS NOTES
[facility]    Name:  Jani Pena   Age:  71 y.o.  Sex:  male  :  1950  MRN:  [id number]   Visit Number:  [id number]  Admission Date:  2021  Date Of Service:  21  Primary Care Physician:  Miguel Rojas MD     LOS: 1 day :    Chief Complaint:      Shortness of Breath    Subjective:    Patient seen and evaluated this morning after being admitted yesterday afternoon.  Patient resting on bipap this morning with stable oxygen saturations.  States his breathing is a little bit better today.  Diuresed well with IV Lasix overnight.  Complaining of LUQ pain.  States he has not had a BM in 3 days.    Hospital Course:    Patient is a 71 year old male who presented to the Emergency Department today with complaints of shortness of breath and increased leg swelling.  Patient was brought in by EMS after being found to have oxygen saturations in the 70s on home oxygen at 6L.  Patient with health history significant for chronic respiratory failure requiring 6L of oxygen all the time at baseline, COPD, CKD, Atrial Fibrillation, Chronic Diastolic Heart Failure, Anxiety, GERD, PEDRO on home Cpap, TIA, and Pacemaker placement.  Patient is a resident at the [facility] assisted living [facility].  Patient reports that he began having increasing shortness of breath for the past 3-4 days with increased leg swelling as well.  Denies any fevers, change in chronic cough, denies feeling sick recently, no sick contacts, and has been fully vaccinated for Covid 19.      Review of Systems:     All systems were reviewed and negative except as mentioned in subjective, assessment and plan.    Vital Signs:    Temp:  [97.4 °F (36.3 °C)-98.5 °F (36.9 °C)] 98.1 °F (36.7 °C)  Heart Rate:  [65-82] 79  Resp:  [15-24] 19  BP: (109-133)/(62-91) 120/65    Intake and output:    I/O last 3 completed shifts:  In: 50 [I.V.:50]  Out: 3025 [Urine:3025]  I/O this shift:  In: 240 [P.O.:240]  Out: 650  [Urine:650]    Physical Examination:    General Appearance:  Alert and cooperative, pleasant elderly male, resting in bed with Bipap in place.   Head:  Atraumatic and normocephalic.   Eyes: Conjunctivae and sclerae normal, no icterus. No pallor.   Throat: No oral lesions, no thrush, oral mucosa moist.   Neck: Supple, trachea midline   Lungs:   Breath sounds heard bilaterally equally.  Trace crackles without wheezing. Bipap in place with stable oxygen saturations.   Heart:  Normal S1 and S2, paced rhythm, pacemaker in place to left chestwall, no murmur, no gallop, no rub.   Abdomen:   Normal bowel sounds, no masses, no organomegaly. Soft, left upper quadrant mildly tender on palpation, nondistended, no rebound tenderness.   Extremities: Supple, trace edema, no cyanosis, no clubbing.   Skin: No bleeding or rash.   Neurologic: Alert and oriented x 3. No facial asymmetry. Moves all four limbs.       Laboratory results:    Results from last 7 days   Lab Units 07/21/21  0545 07/20/21  1055   SODIUM mmol/L 143 141   POTASSIUM mmol/L 4.3 4.9   CHLORIDE mmol/L 94* 98   CO2 mmol/L 39.9* 35.5*   BUN mg/dL 36* 32*   CREATININE mg/dL 1.74* 1.65*   CALCIUM mg/dL 8.9 9.1   BILIRUBIN mg/dL 0.8 0.6   ALK PHOS U/L 83 87   ALT (SGPT) U/L 17 19   AST (SGOT) U/L 19 25   GLUCOSE mg/dL 130* 140*     Results from last 7 days   Lab Units 07/21/21  0545 07/20/21  1055   WBC 10*3/mm3 5.59 10.02   HEMOGLOBIN g/dL 9.4* 9.6*   HEMATOCRIT % 30.7* 32.8*   PLATELETS 10*3/mm3 213 186         Results from last 7 days   Lab Units 07/20/21  1055   TROPONIN T ng/mL 0.050*         Results from last 7 days   Lab Units 07/20/21  1059   PH, ARTERIAL pH units 7.328*   PO2 ART mm Hg 86.9   PCO2, ARTERIAL mm Hg 71.0*   HCO3 ART mmol/L 37.2*     I have reviewed the patient's laboratory results.    Radiology results:    XR Chest 1 View    Result Date: 7/20/2021  PROCEDURE: XR CHEST 1 VW-  HISTORY: CHF/COPD Protocol  COMPARISON: 03/18/2021.  FINDINGS: A left  subclavian pacemaker is in place. The heart is enlarged. The mediastinum is unremarkable. There is interstitial pulmonary edema and small effusions. There is no pneumothorax.  There are no acute osseous abnormalities.      Impression: Cardiomegaly with interstitial edema and small effusions.  Continued followup is recommended.  This report was finalized on 7/20/2021 11:27 AM by Richelle Patel M.D..    I have reviewed the patient's radiology reports.    Medication Review:     I have reviewed the patient's active and prn medications.     Problem List:      Acute on chronic respiratory failure with hypoxia and hypercapnia (CMS/HCC)    Anxiety    Atrial fibrillation (CMS/HCC)    Chronic kidney disease    Steroid-dependent COPD (CMS/HCC)    Gastroesophageal reflux disease    PEDRO on CPAP    Depression    Chronic diastolic congestive heart failure (CMS/HCC)    Coronary artery disease involving native coronary artery of native heart with angina pectoris (CMS/HCC)    Sick sinus syndrome (CMS/HCC)    Chronic respiratory failure with hypoxia and hypercapnia (CMS/HCC)      Assessment:    Acute on chronic respiratory failure with hypoxia and hypercapnia, POA  Acute on chronic diastolic heart failure, POA  Steroid dependent COPD on chronic home O2  CKD  Anxiety  Atrial Fibrillation  GERD  Depression  PEDRO on Cpap    Plan:    Patient has diuresed well with Lasix IV--greater than 3L.  Restart home Lasix today.  Continue Bipap as tolerated to keep oxygen saturations >90%.  Patient is tolerating at this time.  Strict I&Os.  Recheck labs in the morning.  Continue current course of care with supportive care as ordered.  Vital signs are stable.  Continue to monitor creatinine closely.  Discharge back to assisted living when closer to baseline respiratory status.    Risk Assessment: Moderate due to age and comorbidities  DVT Prophylaxis: Lovenox   Code Status: DNR  Diet: Cardiac; pureed    Anna Piña, APRN  07/21/21  11:49  EDT    Dictated utilizing Dragon dictation.

## 2021-07-21 NOTE — PLAN OF CARE
Goal Outcome Evaluation:  Plan of Care Reviewed With: patient        Progress: improving  Outcome Summary: Pt resting some. Lasix being administered as ordered. Urine output good so far. Pt using the bipap majority of the night. Pt will use 6 liters nasal cannula periodically to get a break from the cpap.

## 2021-07-21 NOTE — PROGRESS NOTES
"Adult Nutrition  Assessment/PES    Patient Name:  Jani Pena  YOB: 1950  MRN: [id number]  Admit Date:  7/20/2021    Assessment Date:  7/21/2021    Comments:    Recommend:  1. Consider adding renal modifications to current diet order as medically appropriate and tolerated.  2. Encourage PO intake. Current intake ~75% x 1 meal.   3. RD ordered Arginaid BID.  4. Consider a renal multivitamin with minerals daily.  5. Continue to monitor and replace electrolytes PRN.      RD to follow pt and available PRN.      Reason for Assessment     Row Name 07/21/21 1106          Reason for Assessment    Reason For Assessment  identified at risk by screening criteria     Diagnosis  cardiac disease;pulmonary disease;renal disease A/C resp failure, COPD, AFIB, CHF, CKD, GERD     Identified At Risk by Screening Criteria  other (see comments);MST SCORE 2+;large or nonhealing wound, burn or pressure injury LACE           Anthropometrics     Row Name 07/21/21 1108 07/21/21 0500       Anthropometrics    Height  172.7 cm (68\")  --    Weight  --  80.4 kg (177 lb 4 oz)       Ideal Body Weight (IBW)    Ideal Body Weight (IBW) (kg)  70.89  --        Labs/Tests/Procedures/Meds     Row Name 07/21/21 1107          Labs/Procedures/Meds    Lab Results Reviewed  reviewed, pertinent     Lab Results Comments  Low: Cl; High: BUN, Gluc, Cr        Medications    Pertinent Medications Reviewed  reviewed, pertinent     Pertinent Medications Comments  Lipitor, ferrous sulfate, Remeron, Protonix, Prednisone, Pericolace         Physical Findings     Row Name 07/21/21 1108          Physical Findings    Skin  non-healing wound(s) right anterior foot         Estimated/Assessed Needs     Row Name 07/21/21 1108          Calculation Measurements    Weight Used For Calculations  80.4 kg (177 lb 4 oz)     Height  172.7 cm (68\")        Estimated/Assessed Needs    Additional Documentation  Fluid Requirements (Group);Calorie Requirements " (Group);KCAL/KG (Group);Protein Requirements (Group)        Calorie Requirements    Estimated Calorie Requirement Comment  [id number]        KCAL/KG    KCAL/KG  Specify Kcal/Kg (kcal)     kcal/kg (Specify)  22        Protein Requirements    Weight Used For Protein Calculations  80.4 kg (177 lb 4 oz)     Est Protein Requirement Amount (gms/kg)  0.8 gm protein 48-64 grams     Estimated Protein Requirements (gms/day)  64.32        Fluid Requirements    Fluid Requirements (mL/day)  1500     RDA Method (mL)  1500         Nutrition Prescription Ordered     Row Name 07/21/21 1109          Nutrition Prescription PO    Current PO Diet  Pureed     Common Modifiers  Cardiac         Evaluation of Received Nutrient/Fluid Intake     Row Name 07/21/21 1108          PO Evaluation    Number of Days PO Intake Evaluated  1 day     Number of Meals  1     % PO Intake  75               Problem/Interventions:  Problem 1     Row Name 07/21/21 1110          Nutrition Diagnoses Problem 1    Problem 1  Increased Nutrient Needs     Macronutrient  Protein     Micronutrient  Vitamin;Mineral     Etiology (related to)  Medical Diagnosis     Skin  Non healing wound     Signs/Symptoms (evidenced by)  Report/Observation     Reported/Observed By  RN         Problem 2     Row Name 07/21/21 1110          Nutrition Diagnoses Problem 2    Problem 2  Altered Nutrition Related to Labs     Etiology (related to)  Medical Diagnosis     Endocrine  Hyperglycemia     Renal  CKD     Signs/Symptoms (evidenced by)  Biochemical     Specific Labs Noted  BUN;Creatinine;Glucose             Intervention Goal     Row Name 07/21/21 1110          Intervention Goal    General  Meet nutritional needs for age/condition;Improved nutrition related lab(s)     PO  Meet estimated needs;Maintain intake     Weight  Maintain weight         Nutrition Intervention     Row Name 07/21/21 1110          Nutrition Intervention    RD/Tech Action  Follow Tx progress;Encourage  intake;Recommend/ordered     Recommended/Ordered  Supplement;Diet         Nutrition Prescription     Row Name 07/21/21 1111          Nutrition Prescription PO    PO Prescription  Begin/change diet;Begin/change supplement     Begin/Change Diet to  Pureed     Supplement  Other (comment) Arginaid     Supplement Frequency  2 times a day     Common Modifiers  Cardiac;Renal     New PO Prescription Ordered?  Yes supplement ordered        Other Orders    Obtain Weight  Daily     Obtain Weight Ordered?  No, recommended     Supplement  Vitamin mineral supplement Renal     Supplement Ordered?  No, recommended     Other  Continue to monitor and replace electrolytes PRN         Education/Evaluation     Row Name 07/21/21 1111          Education    Education  Education not appropriate at this time     Please explain  Other (comment) D/C to [facility]        Monitor/Evaluation    Monitor  Per protocol;I&O;PO intake;Supplement intake;Pertinent labs;Weight;Skin status           Electronically signed by:  Rachel Simmons RD  07/21/21 11:12 EDT

## 2021-07-21 NOTE — CASE MANAGEMENT/SOCIAL WORK
Discharge Planning Assessment   [facility]     Patient Name: Jani Pena  MRN: [id number]  Today's Date: 7/21/2021    Admit Date: 7/20/2021    Discharge Needs Assessment     Row Name 07/21/21 1044       Living Environment    Lives With  other (see comments) Assisted Living Facility    Current Living Arrangements  residential facility;other (see comments) Assisted Living Facility    Primary Care Provided by  self;other (see comments) Able to complete personal care.  Lives in Assisted Living    Provides Primary Care For  no one, unable/limited ability to care for self    Family Caregiver if Needed  other (see comments) undetermined    Quality of Family Relationships  supportive    Able to Return to Prior Arrangements  yes    Living Arrangement Comments  Spoke with Haleigh Chamorro. If pt her over 3 nights, he will need to be re-assessed for appropriateness of level of care.       Transition Planning    Patient/Family Anticipates Transition to  other (see comments) Assisted Living Facility ([facility])    Patient/Family Anticipated Services at Transition  durable medical equipment [facility] provides O2 and BiPAP    Transportation Anticipated  family or friend will provide       Discharge Needs Assessment    Readmission Within the Last 30 Days  no previous admission in last 30 days    Current Outpatient/Agency/Support Group  assisted living facility    Equipment Currently Used at Home  bipap;rollator;oxygen;wheelchair, motorized    Concerns to be Addressed  denies needs/concerns at this time    Anticipated Changes Related to Illness  none    Equipment Needed After Discharge  none;other (see comments) Reports no needs at this time    Discharge Facility/Level of Care Needs  assisted living facility    Provided Post Acute Provider List?  N/A    Provided Post Acute Provider Quality & Resource List?  N/A    N/A Quality & Resource List Comment  Pt currently has [facility] for BiPAP and O2        Discharge Plan     Row  Name 07/21/21 1111       Plan    Plan Comments  Pt awake and alert, on BiPAP. Pt confirmed address (lives at [facility]), PCP, telephone # and daughter (April Easton) telephone [phone].      Pt reports has a BiPAP, O2, motorized WC, and rollator.  [facility] provided BiPAP and O2.  He does not have HH visiting.  He reported [facility] provides it's own therapy which he participates.       He reports being able to perform personal care himself.  [facility] provides meds, meals and assistance.  He plans to return to [facility] at DC.  Pt denied any DC needs at this time.        Continued Care and Services - Admitted Since 7/20/2021    Coordination has not been started for this encounter.         Demographic Summary     Row Name 07/21/21 1041       General Information    Admission Type  inpatient    Arrived From  home;other (see comments) [facility]isted [facility] Facility    Expected Length of Stay (LOS)  72 hr    Referral Source  admission list    Reason for Consult  discharge planning    Preferred Language  English     Used During This Interaction  no       Contact Information    Permission Granted to Share Info With  ;family/designee    Contact Information Obtained for      Contact Information Comments  April Easton/daughter/[phone]   Jean Marie Pena/brother/ [phone]        Functional Status     Row Name 07/21/21 1044       Functional Status    Usual Activity Tolerance  poor    Current Activity Tolerance  poor       Functional Status, IADL    Medications  completely dependent    Meal Preparation  completely dependent    Housekeeping  completely dependent    Laundry  completely dependent    Shopping  completely dependent    IADL Comments  Lives in Assisted Living Facility        Psychosocial    No documentation.       Abuse/Neglect    No documentation.       Legal    No documentation.       Substance Abuse    No documentation.       Patient Forms    No  documentation.           Michaela Snowden RN

## 2021-07-22 NOTE — PLAN OF CARE
Goal Outcome Evaluation:         Pt resting, tolerating o2 @ 6L, destats on exertion, working with PT. Will monitor patient.

## 2021-07-22 NOTE — THERAPY EVALUATION
Patient Name: Jani Pena  : 1950    MRN: [id number]                              Today's Date: 2021       Admit Date: 2021    Visit Dx:     ICD-10-CM ICD-9-CM   1. Acute on chronic respiratory failure with hypoxia and hypercapnia (CMS/HCC)  J96.21 518.84    J96.22 786.09     799.02   2. Acute on chronic congestive heart failure, unspecified heart failure type (CMS/HCC)  I50.9 428.0   3. COPD exacerbation (CMS/[facility])  J44.1 491.21     Patient Active Problem List   Diagnosis   • Anxiety   • Atrial fibrillation (CMS/HCC)   • Chronic kidney disease   • Steroid-dependent COPD (CMS/HCC)   • Degeneration of cervical intervertebral disc   • Gastroesophageal reflux disease   • Diverticulosis   • Hemorrhoids   • Hiatal hernia   • Hyperlipidemia   • Kyphosis, acquired   • PEDRO on CPAP   • Benign prostatic hyperplasia with incomplete bladder emptying   • Depression   • History of ventricular septal defect   • Chronic diastolic congestive heart failure (CMS/HCC)   • Coronary artery disease involving native coronary artery of native heart with angina pectoris (CMS/HCC)   • Lumbar radiculopathy   • Anemia   • Cardiac pacemaker in situ   • Sick sinus syndrome (CMS/HCC)   • Chronic respiratory failure with hypoxia and hypercapnia (CMS/HCC)   • Other dysphagia   • Presbyesophagus   • Vitamin D deficiency, unspecified    • Palpitation   • Bilateral leg edema   • Acute on chronic respiratory failure with hypoxia and hypercapnia (CMS/HCC)     Past Medical History:   Diagnosis Date   • Abnormal liver enzymes    • Anemia    • Anxiety    • Arthritis    • Atherosclerotic heart disease of native coronary artery without angina pectoris    • Atrial fibrillation (CMS/HCC)    • Back pain    • BPH (benign prostatic hyperplasia)    • Cataract, bilateral    • Chest pain     2-3 MONTHS AGO.  PER PT, HAS ADDRESSED WITH CARDIOLOGIST.  STATES HAS NTG PRN.   • CHF (congestive heart failure) (CMS/HCC)    • Chronic bronchitis  (CMS/[facility])    • Chronic kidney disease    • COPD (chronic obstructive pulmonary disease) (CMS/[facility])    • Degeneration of cervical intervertebral disc    • Depression    • Diverticulosis    • Dyspnea    • Esophageal reflux    • Esophagitis    • Gastritis    • Hematuria    • Hemorrhoids    • Hiatal hernia    • History of arthritis    • History of nuclear stress test     UNSURE OF DATE   • History of peripheral neuropathy    • History of ventricular septal defect    • History of ventricular septal defect    • Hyperlipidemia    • Hypertension    • Impaired functional mobility, balance, gait, and endurance    • Infectious diarrhea    • Kyphosis, acquired    • Lumbar radiculopathy    • Mitral and aortic valve disease    • Nephrolithiasis    • Phimosis    • Problems with swallowing     WITH FOOD   • Respiratory failure (CMS/[facility])    • Sleep apnea     BIPAP, too awkward to bring   • TIA (transient ischemic attack)     X3.   2014   • Ventral hernia    • Wears glasses     FOR READING     Past Surgical History:   Procedure Laterality Date   • CARDIAC CATHETERIZATION     • CARDIAC CATHETERIZATION N/A 5/24/2018    Procedure: Left Heart Cath;  Surgeon: Edouard Robertson MD;  Location:  [facility]ATH [facility]ASIVE [facility];  Service: Cardiovascular   • CARDIAC ELECTROPHYSIOLOGY PROCEDURE N/A 1/31/2019    Procedure: PACEMAKER IMPLANTATION- DC;  Surgeon: Omar Encinas DO;  Location:  [facility] EP INVASIVE LOCATION;  Service: Cardiology   • COLONOSCOPY N/A 12/28/2019    Procedure: COLONOSCOPY WITH HOT POLYPECTOMY;  Surgeon: Celio Maldonado MD;  Location: [facility] ENDOSCOPY;  Service: Gastroenterology   • ENDOSCOPY N/A 1/26/2019    Procedure: ESOPHAGOGASTRODUODENOSCOPY;  Surgeon: Brunner, Mark I, MD;  Location:  [facility] ENDOSCOPY;  Service: Gastroenterology   • ENDOSCOPY N/A 12/27/2019    Procedure: ESOPHAGOGASTRODUODENOSCOPY;  Surgeon: Valdemar Mcdonald MD;  Location: [facility] ENDOSCOPY;  Service: General   • ENDOSCOPY N/A 10/20/2020    Procedure:  ESOPHAGOGASTRODUODENOSCOPY WITH DILATION AND COLD FORCEP BIOPSY;  Surgeon: Cale Madrid MD;  Location: [facility] ENDOSCOPY;  Service: Gastroenterology;  Laterality: N/A;   • ESOPHAGEAL MOTILITY STUDY N/A 7/6/2021    Procedure: MANOMETRY;  Surgeon: Uriel Tinsley MD;  Location: [facility] ENDOSCOPY;  Service: Gastroenterology;  Laterality: N/A;   • HERNIA REPAIR     • ORTHOPEDIC SURGERY Left     Left hip arthroplasty   • PACEMAKER IMPLANTATION  01/31/2019   • SUPRAPUBIC CATHETER INSERTION      History of Percutaneous Catheter Placement Into Ureter   • THROAT SURGERY      FOR SLEEP APNEA   • VSD REPAIR      History of VSD Repair By Patch Closure     General Information     Row Name 07/22/21 1326          Physical Therapy Time and Intention    Document Type  evaluation  -JR     Mode of Treatment  physical therapy  -JR     Row Name 07/22/21 1326          General Information    Patient Profile Reviewed  yes  -JR     Prior Level of Function  independent:;all household mobility  -JR     Existing Precautions/Restrictions  fall  -JR     Barriers to Rehab  none identified  -JR     Row Name 07/22/21 1326          Living Environment    Lives With  facility resident Assisted Living Facility  -JR     Row Name 07/22/21 1326          Home Main Entrance    Number of Stairs, Main Entrance  none  -JR     Row Name 07/22/21 1326          Stairs Within Home, Primary    Number of Stairs, Within Home, Primary  none  -JR     Row Name 07/22/21 1326          Cognition    Orientation Status (Cognition)  oriented x 4  -JR     Row Name 07/22/21 1326          Safety Issues, Functional Mobility    Safety Issues Affecting Function (Mobility)  insight into deficits/self-awareness;safety precautions follow-through/compliance  -JR     Impairments Affecting Function (Mobility)  strength;endurance/activity tolerance;shortness of breath;postural/trunk control  -JR       User Key  (r) = Recorded By, (t) = Taken By, (c) = Cosigned By    Initials  Name Provider Type     Kaylee Kurtz, PT Physical Therapist        Mobility     Row Name 07/22/21 1326          Bed Mobility    Bed Mobility  bed mobility (all) activities  -     All Activities, [place] (Bed Mobility)  supervision  -     Assistive Device (Bed Mobility)  head of bed elevated;bed rails  -     Row Name 07/22/21 1326          Bed-Chair Transfer    Bed-Chair [place] (Transfers)  contact guard  -     Assistive Device (Bed-Chair Transfers)  -- gait belt  -     Row Name 07/22/21 1326          Sit-Stand Transfer    Sit-Stand [place] (Transfers)  contact guard  -     Assistive Device (Sit-Stand Transfers)  -- gait belt  -     Row Name 07/22/21 1326          Gait/Stairs (Locomotion)    [place] Level (Gait)  contact guard  -     Assistive Device (Gait)  -- gait belt  -     Distance in Feet (Gait)  4  -     Deviations/Abnormal Patterns (Gait)  gait speed decreased;festinating/shuffling;stride length decreased  -     Bilateral Gait Deviations  forward flexed posture;heel strike decreased  -       User Key  (r) = Recorded By, (t) = Taken By, (c) = Cosigned By    Initials Name Provider Type     Kaylee Kurtz, PT Physical Therapist        Obj/Interventions     Row Name 07/22/21 1326          Range of Motion Comprehensive    General Range of Motion  no range of motion deficits identified  -     Row Name 07/22/21 1326          Strength Comprehensive (MMT)    Comment, General Manual Muscle Testing (MMT) Assessment  Grossly 3+/5 throughout  -     Row Name 07/22/21 1326          Motor Skills    Motor Skills  functional endurance  -     Functional Endurance  Patient becomes SOA with conversation  -     Row Name 07/22/21 1326          Balance    Balance Assessment  sitting static balance;sitting dynamic balance;standing static balance;standing dynamic balance  -     Static Sitting Balance  WFL  -     Dynamic Sitting Balance  WFL  -     Static Standing Balance   mild impairment  -JR     Dynamic Standing Balance  mild impairment  -JR       User Key  (r) = Recorded By, (t) = Taken By, (c) = Cosigned By    Initials Name Provider Type    Kaylee Lemos, PT Physical Therapist        Goals/Plan     Row Name 07/22/21 1326          Bed Mobility Goal 1 (PT)    Activity/Assistive Device (Bed Mobility Goal 1, PT)  bed mobility activities, all  -JR     [place] Level/Cues Needed (Bed Mobility Goal 1, PT)  modified independence  -JR     Time Frame (Bed Mobility Goal 1, PT)  1 week  -JR     Progress/Outcomes (Bed Mobility Goal 1, PT)  goal ongoing  -JR     Row Name 07/22/21 1326          Transfer Goal 1 (PT)    Activity/Assistive Device (Transfer Goal 1, PT)  sit-to-stand/stand-to-sit;bed-to-chair/chair-to-bed  -JR     [place] Level/Cues Needed (Transfer Goal 1, PT)  supervision required  -JR     Time Frame (Transfer Goal 1, PT)  1 week  -JR     Progress/Outcome (Transfer Goal 1, PT)  goal ongoing  -JR     Row Name 07/22/21 1326          Gait Training Goal 1 (PT)    Activity/Assistive Device (Gait Training Goal 1, PT)  gait (walking locomotion);assistive device use;walker, rolling  -JR     [place] Level (Gait Training Goal 1, PT)  supervision required  -JR     Distance (Gait Training Goal 1, PT)  50  -JR     Time Frame (Gait Training Goal 1, PT)  1 week  -JR     Progress/Outcome (Gait Training Goal 1, PT)  goal ongoing  -JR     Row Name 07/22/21 1326          Patient Education Goal (PT)    Activity (Patient Education Goal, PT)  Perform HEP x 15 with oxygen saturation levels at least 88%  -JR     [place]/Cues/Accuracy (Memory Goal 2, PT)  demonstrates adequately  -JR     Time Frame (Patient Education Goal, PT)  4 days  -JR     Progress/Outcome (Patient Education Goal, PT)  goal ongoing  -JR       User Key  (r) = Recorded By, (t) = Taken By, (c) = Cosigned By    Initials Name Provider Type    Kaylee Lemos, PT Physical Therapist        Clinical Impression     Row Name  07/22/21 1326          Pain    Additional Documentation  Pain Scale: Numbers Pre/Post-Treatment (Group)  -     Row Name 07/22/21 1326          Pain Scale: Numbers Pre/Post-Treatment    Pretreatment Pain Rating  0/10 - no pain  -JR     Posttreatment Pain Rating  0/10 - no pain  -JR     Row Name 07/22/21 1326          Plan of Care Review    Plan of Care Reviewed With  patient  -JR     Progress  no change  -     Outcome Summary  Patient participates well in PT evaluation and demonstrates decreased strength, activity tolerance, transfers and gait.  He is able to perform transfers and gait with CGA.  He becomes SOA and oxygen saturation levels drop to 82%.  He is able to use PLB to improve sats to 90% in about a minute.  -     Row Name 07/22/21 1326          Therapy Assessment/Plan (PT)    Patient/Family Therapy Goals Statement (PT)  Patient wants to return to [facility]  -     Rehab Potential (PT)  good, to achieve stated therapy goals  -     Criteria for Skilled Interventions Met (PT)  yes;meets criteria;skilled treatment is necessary  -[facility] Name 07/22/21 1326          Positioning and Restraints    Pre-Treatment Position  in bed  -JR     Post Treatment Position  chair  -JR     In Chair  sitting;call light within reach;encouraged to call for assist;with family/caregiver  -       User Key  (r) = Recorded By, (t) = Taken By, (c) = Cosigned By    Initials Name Provider Type    Kaylee Lemos, PT Physical Therapist        Outcome Measures     Row Name 07/22/21 1326          How much help from another person do you currently need...    Turning from your back to your side while in flat bed without using bedrails?  3  -JR     Moving from lying on back to sitting on the side of a flat bed without bedrails?  3  -JR     Moving to and from a bed to a chair (including a wheelchair)?  3  -JR     Standing up from a chair using your arms (e.g., wheelchair, bedside chair)?  3  -JR     Climbing 3-5 steps with a railing?   2  -JR     To walk in hospital room?  3  -JR     AM-PAC 6 Clicks Score (PT)  17  -JR     Row Name 07/22/21 1326          Functional Assessment    Outcome Measure Options  AM-PAC 6 Clicks Basic Mobility (PT)  -       User Key  (r) = Recorded By, (t) = Taken By, (c) = Cosigned By    Initials Name Provider Type    Kaylee Lemos, PT Physical Therapist                       Physical Therapy Education                 Title: PT OT SLP Therapies (Done)     Topic: Physical Therapy (Done)     Point: Mobility training (Done)     Learning Progress Summary           Patient Acceptance, E,TB, VU by  at 7/22/2021 1513    Comment: Role of PT and POC                   Point: Home exercise program (Done)     Learning Progress Summary           Patient Acceptance, E,TB, VU by  at 7/22/2021 1513    Comment: Role of PT and POC                   Point: Body mechanics (Done)     Learning Progress Summary           Patient Acceptance, E,TB, VU by  at 7/22/2021 1513    Comment: Role of PT and POC                   Point: Precautions (Done)     Learning Progress Summary           Patient Acceptance, E,TB, VU by  at 7/22/2021 1513    Comment: Role of PT and POC                               User Key     Initials Effective Dates Name Provider Type Discipline     06/16/21 -  Kaylee Kurtz, WILFRED Physical Therapist PT              PT Recommendation and Plan  Planned Therapy Interventions (PT): strengthening, balance training, bed mobility training, transfer training, gait training, home exercise program, patient/family education  Plan of Care Reviewed With: patient  Progress: no change  Outcome Summary: Patient participates well in PT evaluation and demonstrates decreased strength, activity tolerance, transfers and gait.  He is able to perform transfers and gait with CGA.  He becomes SOA and oxygen saturation levels drop to 82%.  He is able to use PLB to improve sats to 90% in about a minute.     Time Calculation:   PT Charges     Row  Name 07/22/21 1514             Time Calculation    Start Time  1326  -JR      PT Received On  07/22/21  -JR      PT Goal Re-Cert Due Date  08/01/21  -JR         Untimed Charges    PT Eval/Re-eval Minutes  54  -JR         Total Minutes    Untimed Charges Total Minutes  54  -JR       Total Minutes  54  -JR        User Key  (r) = Recorded By, (t) = Taken By, (c) = Cosigned By    Initials Name Provider Type     Kaylee Kurtz, PT Physical Therapist        Therapy Charges for Today     Code Description Service Date Service Provider Modifiers Qty    [id number] HC PT EVAL LOW COMPLEXITY 4 7/22/2021 Kaylee Kurtz, PT GP 1          PT G-Codes  Outcome Measure Options: AM-PAC 6 Clicks Basic Mobility (PT)  AM-PAC 6 Clicks Score (PT): 17    Kaylee Kurtz, PT  7/22/2021

## 2021-07-22 NOTE — PLAN OF CARE
Goal Outcome Evaluation:  Plan of Care Reviewed With: patient        Progress: no change  Outcome Summary: Patient participates well in PT evaluation and demonstrates decreased strength, activity tolerance, transfers and gait.  He is able to perform transfers and gait with CGA.  He becomes SOA and oxygen saturation levels drop to 82%.  He is able to use PLB to improve sats to 90% in about a minute.

## 2021-07-22 NOTE — PROGRESS NOTES
[facility]    PROGRESS NOTE    Name:  Jani Pena   Age:  71 y.o.  Sex:  male  :  1950  MRN:  [id number]   Visit Number:  [id number]  Admission Date:  2021  Date Of Service:  21  Primary Care Physician:  Miguel Rojas MD     LOS: 2 days :    Chief Complaint:      Shortness of breath    Subjective:    Patient seen and evaluated today.  Resting in room with nasal cannula in place at 6L.  Patient states he is feeling better today regarding his breathing.  Patient's abdominal pain resolved after having bowel movement.  Patient with has diuresed well with IV Lasix and breathing improved with Bipap.  No complaints at time of exam, no family at bedside.    Hospital Course:    Patient is a 71 year old male who presented to the Emergency Department today with complaints of shortness of breath and increased leg swelling.  Patient was brought in by EMS after being found to have oxygen saturations in the 70s on home oxygen at 6L.  Patient with health history significant for chronic respiratory failure requiring 6L of oxygen all the time at baseline, COPD, CKD, Atrial Fibrillation, Chronic Diastolic Heart Failure, Anxiety, GERD, PEDRO on home Cpap, TIA, and Pacemaker placement.  Patient is a resident at the [facility] assisted living [facility].  Patient reports that he began having increasing shortness of breath for the past 3-4 days with increased leg swelling as well.  Denies any fevers, change in chronic cough, denies feeling sick recently, no sick contacts, and has been fully vaccinated for Covid 19.      Review of Systems:     All systems were reviewed and negative except as mentioned in subjective, assessment and plan.    Vital Signs:    Temp:  [97.8 °F (36.6 °C)-98.6 °F (37 °C)] 98.6 °F (37 °C)  Heart Rate:  [67-94] 72  Resp:  [18-20] 18  BP: ()/(65-89) 118/71    Intake and output:    I/O last 3 completed shifts:  In: 480 [P.O.:480]  Out: 4725 [Urine:4725]  I/O  this shift:  In: 240 [P.O.:240]  Out: 550 [Urine:550]    Physical Examination:    General Appearance:  Alert and cooperative, resting in bed on exam, with no distress noted.  Pleasant, chronically ill gentleman.   Head:  Atraumatic and normocephalic.   Eyes: Conjunctivae and sclerae normal, no icterus. No pallor.   Throat: No oral lesions, no thrush, oral mucosa moist.   Neck: Supple, trachea midline   Lungs:   Breath sounds heard bilaterally equally.  Crackles noted to posterior bases without wheezing. Resting on baseline 6L with stable saturations.   Heart:  Normal S1 and S2, no murmur, no gallop, no rub. No JVD.   Abdomen:   Normal bowel sounds, no masses, no organomegaly. Soft, nontender, nondistended, no rebound tenderness.   Extremities: Supple, no edema, no cyanosis, no clubbing.   Skin: No bleeding or rash.   Neurologic: Alert and oriented x 3. No facial asymmetry. Moves all four limbs. No tremors.      Laboratory results:    Results from last 7 days   Lab Units 07/22/21  0514 07/21/21  0545 07/20/21  1055   SODIUM mmol/L 143 143 141   POTASSIUM mmol/L 4.2 4.3 4.9   CHLORIDE mmol/L 94* 94* 98   CO2 mmol/L 43.7* 39.9* 35.5*   BUN mg/dL 43* 36* 32*   CREATININE mg/dL 1.91* 1.74* 1.65*   CALCIUM mg/dL 9.1 8.9 9.1   BILIRUBIN mg/dL 0.7 0.8 0.6   ALK PHOS U/L 76 83 87   ALT (SGPT) U/L 14 17 19   AST (SGOT) U/L 17 19 25   GLUCOSE mg/dL 87 130* 140*     Results from last 7 days   Lab Units 07/22/21  0514 07/21/21  0545 07/20/21  1055   WBC 10*3/mm3 8.19 5.59 10.02   HEMOGLOBIN g/dL 9.3* 9.4* 9.6*   HEMATOCRIT % 30.9* 30.7* 32.8*   PLATELETS 10*3/mm3 231 213 186         Results from last 7 days   Lab Units 07/21/21  0545 07/20/21  1055   TROPONIN T ng/mL 0.022 0.050*         Results from last 7 days   Lab Units 07/20/21  1059   PH, ARTERIAL pH units 7.328*   PO2 ART mm Hg 86.9   PCO2, ARTERIAL mm Hg 71.0*   HCO3 ART mmol/L 37.2*     I have reviewed the patient's laboratory results.    Radiology results:    No  radiology results from the last 24 hrs  I have reviewed the patient's radiology reports.    Medication Review:     I have reviewed the patient's active and prn medications.     Problem List:      Acute on chronic respiratory failure with hypoxia and hypercapnia (CMS/HCC)    Anxiety    Atrial fibrillation (CMS/HCC)    Chronic kidney disease    Steroid-dependent COPD (CMS/HCC)    Gastroesophageal reflux disease    PEDRO on CPAP    Depression    Chronic diastolic congestive heart failure (CMS/HCC)    Coronary artery disease involving native coronary artery of native heart with angina pectoris (CMS/HCC)    Sick sinus syndrome (CMS/HCC)    Chronic respiratory failure with hypoxia and hypercapnia (CMS/HCC)      Assessment:    Acute on chronic respiratory failure with hypoxia and hypercapnia, POA  Acute on chronic diastolic heart failure, POA  Steroid dependent COPD on chronic home O2  CKD  Anxiety  Atrial Fibrillation  GERD  Depression  PEDRO on Cpap    Plan:    Creatinine at 1.9 today likely due to IV Lasix.  Continue to monitor closely.  Patient has transitioned to home PO Lasix dose.  Currently utilizing Bipap at HS and PRN, but admits to not using it regularly at home.  Patient continues to have good urine output.  Troponin was mildly elevated on admission, but trended down on recheck.  Patient with no complaints of chest pain.  Continue current course of care and supportive care as ordered.  Discharge back to assisted living when stable.  PT consulted for today.    Risk Assessment: Moderate due to age and comorbidities  DVT Prophylaxis: Lovenox   Code Status: DNR  Diet: Cardiac; dalia Piña, PAL  07/22/21  12:14 EDT    Dictated utilizing Dragon dictation.

## 2021-07-22 NOTE — PLAN OF CARE
Goal Outcome Evaluation:  Plan of Care Reviewed With: patient        Progress: no change  Outcome Summary: VSS att. Pt rested well during shift. No c/o pain during shift. C/o some aniexety that was controlled with PRN xanax.

## 2021-07-23 NOTE — PROGRESS NOTES
Adult Nutrition  Assessment/PES    Patient Name:  Jani Pena  YOB: 1950  MRN: [id number]  Admit Date:  7/20/2021    Assessment Date:  7/23/2021    Comments:    Recommend:  1. Consider adding renal modifications to current diet order as medically appropriate and tolerated.  2. Encourage PO intake. PO intake average ~80% x 5 meals.   3. Continue with Arginaid BID (nectar/syrup thickened).  4. Consider a renal multivitamin with minerals daily.  5. Continue to monitor and replace electrolytes PRN.      RD to follow pt and available PRN.    Reason for Assessment     Row Name 07/23/21 0941          Reason for Assessment    Reason For Assessment  follow-up protocol     Diagnosis  cardiac disease;pulmonary disease;renal disease A/C resp failure, COPD, AFIB, CHF, CKD, GERD     Identified At Risk by Screening Criteria  other (see comments);MST SCORE 2+;large or nonhealing wound, burn or pressure injury LACE           Anthropometrics     Row Name 07/23/21 0500          Anthropometrics    Weight  77.9 kg (171 lb 11.8 oz)         Labs/Tests/Procedures/Meds     Row Name 07/23/21 0941          Labs/Procedures/Meds    Lab Results Reviewed  reviewed, pertinent     Lab Results Comments  Low: Cl- High: BUN, Cr        Medications    Pertinent Medications Reviewed  reviewed, pertinent     Pertinent Medications Comments  Lipitor, Remeron, Deltasone, Protonix, Pericolace, NaCl         Physical Findings     Row Name 07/23/21 0941          Physical Findings    Skin  non-healing wound(s) right anterior foot           Nutrition Prescription Ordered     Row Name 07/23/21 0942          Nutrition Prescription PO    Current PO Diet  Pureed     Fluid Consistency  Nectar/syrup thick     Supplement  Arginaid     Supplement Frequency  2 times a day     Common Modifiers  Cardiac         Evaluation of Received Nutrient/Fluid Intake     Row Name 07/23/21 0942          PO Evaluation    Number of Days PO Intake Evaluated  3 days      Number of Meals  5     % PO Intake  80               Problem/Interventions:  Problem 1     Row Name 07/23/21 0943          Nutrition Diagnoses Problem 1    Problem 1  Increased Nutrient Needs     Macronutrient  Protein     Micronutrient  Vitamin;Mineral     Etiology (related to)  Medical Diagnosis     Skin  Non healing wound     Signs/Symptoms (evidenced by)  Report/Observation     Reported/Observed By  RN         Problem 2     Row Name 07/23/21 0943          Nutrition Diagnoses Problem 2    Problem 2  Altered Nutrition Related to Labs     Etiology (related to)  Medical Diagnosis     Endocrine  Hyperglycemia     Renal  CKD     Signs/Symptoms (evidenced by)  Biochemical     Specific Labs Noted  BUN;Creatinine             Intervention Goal     Row Name 07/23/21 0943          Intervention Goal    General  Meet nutritional needs for age/condition;Improved nutrition related lab(s)     PO  Meet estimated needs;Maintain intake;PO intake (%)     PO Intake %  -- 75 - 100%     Weight  Maintain weight         Nutrition Intervention     Row Name 07/23/21 0943          Nutrition Intervention    RD/Tech Action  Follow Tx progress;Encourage intake;Recommend/ordered     Recommended/Ordered  Diet         Nutrition Prescription     Row Name 07/23/21 0943          Nutrition Prescription PO    PO Prescription  Begin/change diet     Begin/Change Diet to  Pureed     Fluid Consistency  Nectar/syrup thick     Common Modifiers  Cardiac;Renal     New PO Prescription Ordered?  No, recommended        Other Orders    Obtain Weight  Daily     Obtain Weight Ordered?  No, recommended     Supplement  Vitamin mineral supplement Renal     Supplement Ordered?  No, recommended     Other  Continue to monitor and replace electrolytes PRN         Education/Evaluation     Row Name 07/23/21 0988          Education    Education  Education not appropriate at this time     Please explain  Other (comment) Pt to d/c to NH        Monitor/Evaluation    Monitor   Per protocol;I&O;PO intake;Supplement intake;Pertinent labs;Weight;Skin status           Electronically signed by:  Fabiana Jonas RD  07/23/21 09:44 EDT

## 2021-07-23 NOTE — THERAPY TREATMENT NOTE
Patient reports that he is expecting to go home today and would prefer to save his energy for getting home rather than work with PT today.

## 2021-07-23 NOTE — CASE MANAGEMENT/SOCIAL WORK
Continued Stay Note  [facility]     Patient Name: Jani Pena  MRN: [id number]  Today's Date: 7/23/2021    Admit Date: 7/20/2021    Discharge Plan     Row Name 07/23/21 0855       Plan    Plan  Called to [facility] at [phone] spoke to Teresa, she will email Haleigh to have her call to see if pt could come back over the weekend if medically ready.    14:17 EDT  Received call back from Haleigh CANALES at [facility] and pt can be accepted on Saturday but they could not provide transportation, would have to be the family or the hospital or EMS to transport pt back.  They do not accept pt's on Sunday at so would have to wait until Monday. Pt is on 6 liters of o2 provided by [employer].  Also Haleigh did suggest a Palliative Care consult as pt is non compliant with his treatment regime.  Updated clinicals Faxed to [phone]. Would need to call report to [phone] and FAX DCS to [phone].  Anna AGUILLON also updated.         Discharge Codes    No documentation.       Expected Discharge Date and Time     Expected Discharge Date Expected Discharge Time    Jul 26, 2021             Belgica Barry RN

## 2021-07-23 NOTE — PROGRESS NOTES
[facility] HOSPITALIST    PROGRESS NOTE    Name:  Jani Pena   Age:  71 y.o.  Sex:  male  :  1950  MRN:  [id number]   Visit Number:  [id number]  Admission Date:  2021  Date Of Service:  21  Primary Care Physician:  Miguel Rojas MD     LOS: 3 days :    Chief Complaint:      Shortness of Breath    Subjective:    Patient seen and evaluated today resting on Bipap.  Oxygen saturations have been stable at rest but noted desaturations with ambulation.  Discussed use of bipap at home and he advised he did not use it like he should at home.  Nursing has reported continued choking episodes even with pureed and nectar thick.  Discussed plan of care and patient is agreeable.  Case management is working on transportation when stable back to assisted living.      Hospital Course:    Patient is a 71 year old male who presented to the Emergency Department today with complaints of shortness of breath and increased leg swelling.  Patient was brought in by EMS after being found to have oxygen saturations in the 70s on home oxygen at 6L.  Patient with health history significant for chronic respiratory failure requiring 6L of oxygen all the time at baseline, COPD, CKD, Atrial Fibrillation, Chronic Diastolic Heart Failure, Anxiety, GERD, PEDRO on home Cpap, TIA, and Pacemaker placement.  Patient is a resident at the [facility] living facility.  Patient reports that he began having increasing shortness of breath for the past 3-4 days with increased leg swelling as well.  Denies any fevers, change in chronic cough, denies feeling sick recently, no sick contacts, and has been fully vaccinated for Covid 19.  Given IV Lasix for diuresis on admission and placed on Bipap responding well.    Review of Systems:     All systems were reviewed and negative except as mentioned in subjective, assessment and plan.    Vital Signs:    Temp:  [97.4 °F (36.3 °C)-98 °F (36.7 °C)] 97.6 °F (36.4 °C)  Heart  Rate:  [] 77  Resp:  [18-20] 20  BP: (115-136)/(57-79) 123/62    Intake and output:    I/O last 3 completed shifts:  In: 720 [P.O.:720]  Out: 2975 [Urine:2975]  I/O this shift:  In: 240 [P.O.:240]  Out: 300 [Urine:300]    Physical Examination:    General Appearance:  Alert and cooperative, resting in bed on exam with no distress noted.  Pleasant, chronically ill male.   Head:  Atraumatic and normocephalic.   Eyes: Conjunctivae and sclerae normal, no icterus. No pallor.   Throat: No oral lesions, no thrush, oral mucosa moist.   Neck: Supple, trachea midline   Lungs:   Breath sounds heard bilaterally equally.  Crackles to left base without wheezing.  Bipap in place on exam.   Heart:  Normal S1 and S2, no murmur, no gallop, no rub. No JVD.   Abdomen:   Normal bowel sounds, no masses, no organomegaly. Soft, nontender, nondistended, no rebound tenderness.   Extremities: Supple, no edema, no cyanosis, no clubbing.   Skin: No bleeding or rash.   Neurologic: Alert and oriented x 3. No facial asymmetry. Moves all four limbs but deconditioned.        Laboratory results:    Results from last 7 days   Lab Units 07/23/21  0850 07/22/21  0514 07/21/21  0545 07/20/21  1055 07/20/21  1055   SODIUM mmol/L 143 143 143   < > 141   POTASSIUM mmol/L 4.2 4.2 4.3   < > 4.9   CHLORIDE mmol/L 96* 94* 94*   < > 98   CO2 mmol/L 40.7* 43.7* 39.9*   < > 35.5*   BUN mg/dL 40* 43* 36*   < > 32*   CREATININE mg/dL 1.76* 1.91* 1.74*   < > 1.65*   CALCIUM mg/dL 9.0 9.1 8.9   < > 9.1   BILIRUBIN mg/dL  --  0.7 0.8  --  0.6   ALK PHOS U/L  --  76 83  --  87   ALT (SGPT) U/L  --  14 17  --  19   AST (SGOT) U/L  --  17 19  --  25   GLUCOSE mg/dL 106* 87 130*   < > 140*    < > = values in this interval not displayed.     Results from last 7 days   Lab Units 07/23/21  0850 07/22/21  0514 07/21/21  0545   WBC 10*3/mm3 8.21 8.19 5.59   HEMOGLOBIN g/dL 9.6* 9.3* 9.4*   HEMATOCRIT % 32.7* 30.9* 30.7*   PLATELETS 10*3/mm3 234 231 213         Results from  last 7 days   Lab Units 07/21/21  0545 07/20/21  1055   TROPONIN T ng/mL 0.022 0.050*         Results from last 7 days   Lab Units 07/20/21  1059   PH, ARTERIAL pH units 7.328*   PO2 ART mm Hg 86.9   PCO2, ARTERIAL mm Hg 71.0*   HCO3 ART mmol/L 37.2*     I have reviewed the patient's laboratory results.    Radiology results:    No radiology results from the last 24 hrs  I have reviewed the patient's radiology reports.    Medication Review:     I have reviewed the patient's active and prn medications.     Problem List:      Acute on chronic respiratory failure with hypoxia and hypercapnia (CMS/HCC)    Anxiety    Atrial fibrillation (CMS/HCC)    Chronic kidney disease    Steroid-dependent COPD (CMS/HCC)    Gastroesophageal reflux disease    PEDRO on CPAP    Depression    Chronic diastolic congestive heart failure (CMS/HCC)    Coronary artery disease involving native coronary artery of native heart with angina pectoris (CMS/HCC)    Sick sinus syndrome (CMS/HCC)    Chronic respiratory failure with hypoxia and hypercapnia (CMS/HCC)      Assessment:    Acute on chronic respiratory failure with hypoxia and hypercapnia, POA  Acute on chronic diastolic heart failure, POA  Steroid dependent COPD on chronic home O2  CKD  Anxiety  Atrial Fibrillation  GERD  Depression  PEDRO on Cpap    Plan:    Continue to monitor closely.  Will diurese with 40mg Lasix IV x2 doses today.  Creatinine stable today at 1.7.  Spoke with speech who advised if patient was already on pureed and nectar thick there was not anything additional that could be done.  If patient is able to be discharged tomorrow family or EMS would have to transport back to facility.  Facility does not accept patients on Sunday.  Supportive care as ordered.  Discharge back to facility when stable.     DVT Prophylaxis: Lovenox   Code Status: DNR  Diet: Cardiac; pureed  Discharge:  Back to [facility] when stable    Anna Piña, PAL  07/23/21  14:23 EDT    Dictated utilizing  Anahi dictation.

## 2021-07-23 NOTE — PLAN OF CARE
Goal Outcome Evaluation:      Patient having increased swallowing difficulty, soa, wearing bi-pap when not on o2. will monitor patient.

## 2021-07-23 NOTE — PLAN OF CARE
Goal Outcome Evaluation:  Plan of Care Reviewed With: patient        Progress: improving  Outcome Summary: VSS att. Pt rested well and wore BiPAP throughout the night. No c/o pain during shift. Expected to d/c back to [facility] when ready.

## 2021-07-24 NOTE — PLAN OF CARE
Goal Outcome Evaluation:  Plan of Care Reviewed With: patient        Progress: no change  Outcome Summary: VSS att. No c/o pain during shift. Pt still having some difficulty swallowing at times.

## 2021-07-24 NOTE — THERAPY TREATMENT NOTE
Patient Name: Jani Pena  : 1950    MRN: [id number]                              Today's Date: 2021       Admit Date: 2021    Visit Dx:     ICD-10-CM ICD-9-CM   1. Acute on chronic respiratory failure with hypoxia and hypercapnia (CMS/HCC)  J96.21 518.84    J96.22 786.09     799.02   2. Acute on chronic congestive heart failure, unspecified heart failure type (CMS/HCC)  I50.9 428.0   3. COPD exacerbation (CMS/HCC)  J44.1 491.21   4. Anxiety  F41.9 300.00   5. Degeneration of cervical intervertebral disc  M50.30 722.4     Patient Active Problem List   Diagnosis   • Anxiety   • Atrial fibrillation (CMS/HCC)   • Chronic kidney disease   • Steroid-dependent COPD (CMS/HCC)   • Degeneration of cervical intervertebral disc   • Gastroesophageal reflux disease   • Diverticulosis   • Hemorrhoids   • Hiatal hernia   • Hyperlipidemia   • Kyphosis, acquired   • PEDRO on CPAP   • Benign prostatic hyperplasia with incomplete bladder emptying   • Depression   • History of ventricular septal defect   • Chronic diastolic congestive heart failure (CMS/HCC)   • Coronary artery disease involving native coronary artery of native heart with angina pectoris (CMS/HCC)   • Lumbar radiculopathy   • Anemia   • Cardiac pacemaker in situ   • Sick sinus syndrome (CMS/HCC)   • Chronic respiratory failure with hypoxia and hypercapnia (CMS/HCC)   • Other dysphagia   • Presbyesophagus   • Vitamin D deficiency, unspecified    • Palpitation   • Bilateral leg edema   • Acute on chronic respiratory failure with hypoxia and hypercapnia (CMS/HCC)     Past Medical History:   Diagnosis Date   • Abnormal liver enzymes    • Anemia    • Anxiety    • Arthritis    • Atherosclerotic heart disease of native coronary artery without angina pectoris    • Atrial fibrillation (CMS/HCC)    • Back pain    • BPH (benign prostatic hyperplasia)    • Cataract, bilateral    • Chest pain     2-3 MONTHS AGO.  PER PT, HAS ADDRESSED WITH CARDIOLOGIST.   STATES HAS NTG PRN.   • CHF (congestive heart failure) (CMS/HCC)    • Chronic bronchitis (CMS/HCC)    • Chronic kidney disease    • COPD (chronic obstructive pulmonary disease) (CMS/HCC)    • Degeneration of cervical intervertebral disc    • Depression    • Diverticulosis    • Dyspnea    • Esophageal reflux    • Esophagitis    • Gastritis    • Hematuria    • Hemorrhoids    • Hiatal hernia    • History of arthritis    • History of nuclear stress test     UNSURE OF DATE   • History of peripheral neuropathy    • History of ventricular septal defect    • History of ventricular septal defect    • Hyperlipidemia    • Hypertension    • Impaired functional mobility, balance, gait, and endurance    • Infectious diarrhea    • Kyphosis, acquired    • Lumbar radiculopathy    • Mitral and aortic valve disease    • Nephrolithiasis    • Phimosis    • Problems with swallowing     WITH FOOD   • Respiratory failure (CMS/HCC)    • Sleep apnea     BIPAP, too awkward to bring   • TIA (transient ischemic attack)     X3.   2014   • Ventral hernia    • Wears glasses     FOR READING     Past Surgical History:   Procedure Laterality Date   • CARDIAC CATHETERIZATION     • CARDIAC CATHETERIZATION N/A 5/24/2018    Procedure: Left Heart Cath;  Surgeon: Edouard Robertson MD;  Location:  [facility];  Service: Cardiovascular   • CARDIAC ELECTROPHYSIOLOGY PROCEDURE N/A 1/31/2019    Procedure: PACEMAKER IMPLANTATION- DC;  Surgeon: Omar Encinas DO;  Location:  [facility];  Service: Cardiology   • COLONOSCOPY N/A 12/28/2019    Procedure: COLONOSCOPY WITH HOT POLYPECTOMY;  Surgeon: Celio Maldonado MD;  [facility]:  [facility];  Service: Gastroenterology   • ENDOSCOPY N/A 1/26/2019    Procedure: ESOPHAGOGASTRODUODENOSCOPY;  Surgeon: Brunner, Mark I, MD;  Location:  [facility];  Service: Gastroenterology   • ENDOSCOPY N/A 12/27/2019    Procedure: ESOPHAGOGASTRODUODENOSCOPY;  Surgeon: Valdemar Mcdonald MD;   Location: [facility] ENDOSCOPY;  Service: General   • ENDOSCOPY N/A 10/20/2020    Procedure: ESOPHAGOGASTRODUODENOSCOPY WITH DILATION AND COLD FORCEP BIOPSY;  Surgeon: Cale Madrid MD;  Location: [facility] ENDOSCOPY;  Service: Gastroenterology;  Laterality: N/A;   • ESOPHAGEAL MOTILITY STUDY N/A 7/6/2021    Procedure: MANOMETRY;  Surgeon: Uriel Tinsley MD;  Location: [facility] ENDOSCOPY;  Service: Gastroenterology;  Laterality: N/A;   • HERNIA REPAIR     • ORTHOPEDIC SURGERY Left     Left hip arthroplasty   • PACEMAKER IMPLANTATION  01/31/2019   • SUPRAPUBIC CATHETER INSERTION      History of Percutaneous Catheter Placement Into Ureter   • THROAT SURGERY      FOR SLEEP APNEA   • VSD REPAIR      History of VSD Repair By Patch Closure     General Information     Row Name 07/24/21 1056          Physical Therapy Time and Intention    Document Type  therapy note (daily note)  -TW     Mode of Treatment  physical therapy  -TW     Row Name 07/24/21 1056          General Information    Patient Profile Reviewed  yes  -TW     Existing Precautions/Restrictions  fall;oxygen therapy device and L/min  -TW     Barriers to Rehab  previous functional deficit;medically complex  -TW     Row Name 07/24/21 1056          Cognition    Orientation Status (Cognition)  oriented x 4  -TW     Row Name 07/24/21 1056          Safety Issues, Functional Mobility    Safety Issues Affecting Function (Mobility)  safety precaution awareness;safety precautions follow-through/compliance;insight into deficits/self-awareness  -TW     Impairments Affecting Function (Mobility)  balance;endurance/activity tolerance;shortness of breath;strength;postural/trunk control  -TW       User Key  (r) = Recorded By, (t) = Taken By, (c) = Cosigned By    Initials Name Provider Type    TW Jacqueline Horton PT Physical Therapist        Mobility     Row Name 07/24/21 1056          Bed Mobility    Bed Mobility  supine-sit;sit-supine  -TW     Supine-Sit [place] (Bed  Mobility)  standby assist  -TW     Sit-Supine [place] (Bed Mobility)  standby assist  -TW     Assistive Device (Bed Mobility)  bed rails;head of bed elevated  -TW     Comment (Bed Mobility)  Extneded time needed to complete task and pt does become short of breath with any bed mobility with drop in O2 saturation to 86%. Slow return.  -TW     Row Name 07/24/21 1056          Transfers    Comment (Transfers)  Again pt has drop in his O2 saturation and c/o shortness of breath with all transfers.  -TW     Row Name 07/24/21 1056          Bed-Chair Transfer    Bed-Chair [place] (Transfers)  not tested  -TW     Row Name 07/24/21 1056          Sit-Stand Transfer    Sit-Stand [place] (Transfers)  contact guard;verbal cues  -TW     Assistive Device (Sit-Stand Transfers)  walker, front-wheeled  -TW     Row Name 07/24/21 1056          Gait/Stairs (Locomotion)    [place] Level (Gait)  not tested  -TW     Distance in Feet (Gait)  4 side steps to the L along the EOB  -TW     Bilateral Gait Deviations  forward flexed posture;heel strike decreased;weight shift ability decreased  -TW     Comment (Gait/Stairs)  drop in O2 saturation with side stepping.  -TW       User Key  (r) = Recorded By, (t) = Taken By, (c) = Cosigned By    Initials Name Provider Type    TW Jacqueline Horton PT Physical Therapist        Obj/Interventions     Row Name 07/24/21 1056          Motor Skills    Therapeutic Exercise  knee;ankle;hip B TKE, ankle pums and sitting marching 2 x 5 with drop in O2 sat  -TW     Row Name 07/24/21 1056          Balance    Balance Assessment  sitting static balance;sitting dynamic balance;standing static balance;standing dynamic balance  -TW     Static Sitting Balance  WFL;unsupported;sitting, edge of bed  -TW     Dynamic Sitting Balance  WFL;unsupported;sitting, edge of bed  -TW     Static Standing Balance  WFL;supported  -TW     Dynamic Standing Balance  mild impairment;supported  -TW     Comment, Balance  Pt did  keep sitting balance independently on EOB x 12 min with noted drop in O2 sat with any LE movement.  -TW       User Key  (r) = Recorded By, (t) = Taken By, (c) = Cosigned By    Initials Name Provider Type    Jacqueline Ochoa PT Physical Therapist        Goals/Plan    No documentation.       Clinical Impression     Row Name 07/24/21 1056          Pain Scale: Numbers Pre/Post-Treatment    Pretreatment Pain Rating  0/10 - no pain  -TW     Posttreatment Pain Rating  0/10 - no pain  -TW     Row Name 07/24/21 1056          Plan of Care Review    Plan of Care Reviewed With  patient  -TW     Progress  no change  -TW     Outcome Summary  PT treatment completed bedside with pt cooperative but having shortness of breath with all activity while on 6 LPM NC O2. Pt worked on pursed lip breathing and energy conservation during ther ex as per flowsheet. Pt needed CGA for his mobility. Will plan to continue current PT POC.  -TW     Row Name 07/24/21 1056          Vital Signs    Pretreatment Heart Rate (beats/min)  72  -TW     Intratreatment Heart Rate (beats/min)  93  -TW     Posttreatment Heart Rate (beats/min)  84  -TW     Pre SpO2 (%)  93  -TW     O2 Delivery Pre Treatment  supplemental O2 6 L  -TW     Intra SpO2 (%)  86  -TW     O2 Delivery Intra Treatment  supplemental O2 6 L  -TW     Post SpO2 (%)  93  -TW     O2 Delivery Post Treatment  supplemental O2 6 L  -TW     Pre Patient Position  Supine  -TW     Intra Patient Position  Standing  -TW     Post Patient Position  Supine  -TW     Row Name 07/24/21 1056          Positioning and Restraints    Pre-Treatment Position  in bed  -TW     Post Treatment Position  bed  -TW     In Bed  fowlers;call light within reach;encouraged to call for assist;side rails up x3  -TW       User Key  (r) = Recorded By, (t) = Taken By, (c) = Cosigned By    Initials Name Provider Type    Jacqueline Ochoa PT Physical Therapist        Outcome Measures     Row Name 07/24/21 1056          How much help  from another person do you currently need...    Turning from your back to your side while in flat bed without using bedrails?  4  -TW     Moving from lying on back to sitting on the side of a flat bed without bedrails?  3  -TW     Moving to and from a bed to a chair (including a wheelchair)?  3  -TW     Standing up from a chair using your arms (e.g., wheelchair, bedside chair)?  3  -TW     Climbing 3-5 steps with a railing?  2  -TW     To walk in hospital room?  3  -TW     AM-PAC 6 Clicks Score (PT)  18  -TW     Row Name 07/24/21 1056          Functional Assessment    Outcome Measure Options  AM-PAC 6 Clicks Basic Mobility (PT)  -TW       User Key  (r) = Recorded By, (t) = Taken By, (c) = Cosigned By    Initials Name Provider Type    Jacqueline Ochoa, PT Physical Therapist                       Physical Therapy Education                 Title: PT OT SLP Therapies (Done)     Topic: Physical Therapy (Done)     Point: Mobility training (Done)     Learning Progress Summary           Patient Acceptance, E,D, DU by TW at 7/24/2021 1056    Comment: Pt education for LE ther ex technique as well as using rolling walker for safe transfers/gait.    Acceptance, E,TB, VU by CC at 7/23/2021 1241    Acceptance, E,TB, VU by JR at 7/22/2021 1513    Comment: Role of PT and POC                   Point: Home exercise program (Done)     Learning Progress Summary           Patient Acceptance, E,D, DU by TW at 7/24/2021 1056    Comment: Pt education for LE ther ex technique as well as using rolling walker for safe transfers/gait.    Acceptance, E,TB, VU by CC at 7/23/2021 1241    Acceptance, E,TB, VU by JR at 7/22/2021 1513    Comment: Role of PT and POC                   Point: Body mechanics (Done)     Learning Progress Summary           Patient Acceptance, E,TB, VU by CC at 7/23/2021 1241    Acceptance, E,TB, VU by JR at 7/22/2021 1513    Comment: Role of PT and POC                   Point: Precautions (Done)     Learning Progress  Summary           Patient Acceptance, E,TB, VU by  at 7/23/2021 1241    Acceptance, E,TB, VU by  at 7/22/2021 1513    Comment: Role of PT and POC                               User Key     Initials Effective Dates Name Provider Type Discipline     06/16/21 -  Kaylee Kurtz, PT Physical Therapist PT     06/16/21 -  [facility] RN Registered Nurse Nurse     06/16/21 -  Jacqueline Horton PT Physical Therapist PT              PT Recommendation and Plan     Plan of Care Reviewed With: patient  Progress: no change  Outcome Summary: PT treatment completed bedside with pt cooperative but having shortness of breath with all activity while on 6 LPM NC O2. Pt worked on pursed lip breathing and energy conservation during ther ex as per flowsheet. Pt needed CGA for his mobility. Will plan to continue current PT POC.     Time Calculation:   PT Charges     Row Name 07/24/21 1056             Time Calculation    Start Time  1035  -TW      Stop Time  1056  -TW      Time Calculation (min)  21 min  -TW      PT Received On  07/24/21  -TW         Timed Charges    01827 - PT Therapeutic Activity Minutes  21  -TW         Total Minutes    Timed Charges Total Minutes  21  -TW       Total Minutes  21  -TW        User Key  (r) = Recorded By, (t) = Taken By, (c) = Cosigned By    Initials Name Provider Type     Jacqueline Horton PT Physical Therapist        Therapy Charges for Today     Code Description Service Date Service Provider Modifiers Qty    [id number]  PT THERAPEUTIC ACT EA 15 MIN 7/24/2021 Jacqueline Horton PT GP 1          PT G-Codes  Outcome Measure Options: AM-PAC 6 Clicks Basic Mobility (PT)  AM-PAC 6 Clicks Score (PT): 18    Jacqueline Horton PT  7/24/2021

## 2021-07-24 NOTE — PROGRESS NOTES
[facility]    Name:  Jani Pena   Age:  71 y.o.  Sex:  male  :  1950  MRN:  [id number]   Visit Number:  [id number]  Admission Date:  2021  Date Of Service:  21  Primary Care Physician:  Miguel Rojas MD     LOS: 4 days :    Chief Complaint:      Shortness of breath    Subjective:    Patient seen and evaluated today.  Very anxious about leaving the hospital.  Tells me that he still is not breathing well.  Is on his home requirement at rest but does have significant oxygen requirement well above baseline with any type of activity and does require a long amount of time to recover.  This is confirmed by PT.  Nonproductive cough.  Patient does appear to be in some mild respiratory distress.    Hospital Course:    Patient is a 71 year old male who presented to the Emergency Department today with complaints of shortness of breath and increased leg swelling.  Patient was brought in by EMS after being found to have oxygen saturations in the 70s on home oxygen at 6L.  Patient with health history significant for chronic respiratory failure requiring 6L of oxygen all the time at baseline, COPD, CKD, Atrial Fibrillation, Chronic Diastolic Heart Failure, Anxiety, GERD, PEDRO on home Cpap, TIA, and Pacemaker placement.  Patient is a resident at the [facility] assisted living facility.  Patient reports that he began having increasing shortness of breath for the past 3-4 days with increased leg swelling as well.  Denies any fevers, change in chronic cough, denies feeling sick recently, no sick contacts, and has been fully vaccinated for Covid 19.  Given IV Lasix for diuresis on admission and placed on Bipap.  Patient with continued hypoxia.  Repeat chest x-ray from  showed bilateral pleural effusion still present though improved.  Patient still requiring significant amount of oxygen with activity.  Use of accessory muscles.  Procalcitonin was minimally  elevated.  Started on Rocephin and azithromycin.  Encourage incentive spirometer.    Review of Systems:     All systems were reviewed and negative except as mentioned in subjective, assessment and plan.    Vital Signs:    Temp:  [97.8 °F (36.6 °C)-98.9 °F (37.2 °C)] 98.3 °F (36.8 °C)  Heart Rate:  [65-84] 78  Resp:  [18-24] 18  BP: ()/(51-93) 114/60    Intake and output:    I/O last 3 completed shifts:  In: 480 [P.O.:480]  Out: 3450 [Urine:3450]  I/O this shift:  In: 480 [P.O.:480]  Out: 300 [Urine:300]    Physical Examination:    General Appearance:  Alert and cooperative.    Head:  Atraumatic and normocephalic.   Eyes: Conjunctivae and sclerae normal, no icterus. No pallor.   Throat: No oral lesions, no thrush, oral mucosa moist.   Neck: Supple, trachea midline, no thyromegaly.   Lungs:   Breath sounds heard bilaterally equally.  Positive crackles in the lung bases.  Some use of accessory muscles though mild   Heart:  Normal S1 and S2, no murmur, No JVD.   Abdomen:   Normal bowel sounds, Soft, nontender, nondistended, no rebound tenderness.   Extremities: Supple, no edema, no cyanosis, no clubbing.   Skin: No bleeding or rash.   Neurologic: Alert and oriented x 3. No facial asymmetry. Moves all four limbs.      Laboratory results:    Results from last 7 days   Lab Units 07/24/21  0544 07/23/21  0850 07/22/21  0514 07/21/21  0545 07/21/21  0545   SODIUM mmol/L 147* 143 143   < > 143   POTASSIUM mmol/L 4.1 4.2 4.2   < > 4.3   CHLORIDE mmol/L 92* 96* 94*   < > 94*   CO2 mmol/L 44.3* 40.7* 43.7*   < > 39.9*   BUN mg/dL 44* 40* 43*   < > 36*   CREATININE mg/dL 1.93* 1.76* 1.91*   < > 1.74*   CALCIUM mg/dL 9.5 9.0 9.1   < > 8.9   BILIRUBIN mg/dL 1.4*  --  0.7  --  0.8   ALK PHOS U/L 88  --  76  --  83   ALT (SGPT) U/L 14  --  14  --  17   AST (SGOT) U/L 19  --  17  --  19   GLUCOSE mg/dL 98 106* 87   < > 130*    < > = values in this interval not displayed.     Results from last 7 days   Lab Units 07/24/21  0544  07/23/21  0850 07/22/21  0514   WBC 10*3/mm3 9.65 8.21 8.19   HEMOGLOBIN g/dL 11.1* 9.6* 9.3*   HEMATOCRIT % 37.5 32.7* 30.9*   PLATELETS 10*3/mm3 254 234 231         Results from last 7 days   Lab Units 07/21/21  0545 07/20/21  1055   TROPONIN T ng/mL 0.022 0.050*         Results from last 7 days   Lab Units 07/20/21  1059   PH, ARTERIAL pH units 7.328*   PO2 ART mm Hg 86.9   PCO2, ARTERIAL mm Hg 71.0*   HCO3 ART mmol/L 37.2*     I have reviewed the patient's laboratory results.    Radiology results:    XR Chest 1 View    Result Date: 7/24/2021  PROCEDURE: XR CHEST 1 VW-  HISTORY: pulm edema, chf; J96.21-Acute and chronic respiratory failure with hypoxia; J96.22-Acute and chronic respiratory failure with hypercapnia; I50.9-Heart failure, unspecified; J44.1-Chronic obstructive pulmonary disease with (acute) exacerbation; F41.9-Anxiety disorder, unspecified; M50.30-Other cervical disc degeneration, unspecified cervical region  COMPARISON: 07/20/2021.  FINDINGS: A left subclavian pacemaker is in place. The heart is normal in size. The mediastinum is unremarkable. There has been interval improvement in the patient's interstitial edema. There are persistent small effusions. There is no pneumothorax.  There are no acute osseous abnormalities.      Impression: Improvement in patient's pulmonary edema with persistent small effusions.  Continued followup is recommended.  This report was finalized on 7/24/2021 12:34 PM by Richelle Patel M.D..    I have reviewed the patient's radiology reports.    Medication Review:     I have reviewed the patient's active and prn medications.     Problem List:      Acute on chronic respiratory failure with hypoxia and hypercapnia (CMS/HCC)    Anxiety    Atrial fibrillation (CMS/HCC)    Chronic kidney disease    Steroid-dependent COPD (CMS/HCC)    Gastroesophageal reflux disease    PEDRO on CPAP    Depression    Chronic diastolic congestive heart failure (CMS/HCC)    Coronary artery disease  involving native coronary artery of native heart with angina pectoris (CMS/HCC)    Sick sinus syndrome (CMS/HCC)    Chronic respiratory failure with hypoxia and hypercapnia (CMS/HCC)      Assessment:    Acute on chronic respiratory failure with hypoxia and hypercapnia, POA  Acute on chronic diastolic heart failure, POA  Steroid dependent COPD on chronic home O2  Community-acquired pneumonia  CKD  Anxiety  Atrial Fibrillation  GERD  Depression  PEDRO on Cpap    Plan:    Continue to monitor in the hospital.  He is on his baseline level of oxygen requirement at rest but still requiring significant amounts with activity with a long time in order to recover.  Patient receiving IV Lasix 40 mg daily.  We will give an additional dose this afternoon.  Procalcitonin slightly elevated today.  Chest x-ray not concerning for consolidation but still shows bilateral effusions though improved.  With elevated procalcitonin and continued hypoxia, will add on Rocephin and azithromycin today.  Anticipate 5-day course.  Also encouraged incentive spirometer.  Continue to monitor strict I's and O's.  Facility does not accept patients back on Sunday so patient will at least be here until Monday.  Further orders as clinical course dictates.    DVT Prophylaxis: Lovenox  Code Status: DNR  Diet: Cardiac: Puréed  Discharge Plan: Back to [facility] living once stable    Db Guillory DO  07/24/21  15:59 EDT    Dictated utilizing Dragon dictation.

## 2021-07-24 NOTE — PLAN OF CARE
Goal Outcome Evaluation:  Plan of Care Reviewed With: patient        Progress: no change  Outcome Summary: PT treatment completed bedside with pt cooperative but having shortness of breath with all activity while on 6 LPM NC O2. Pt worked on pursed lip breathing and energy conservation during ther ex as per flowsheet. Pt needed CGA for his mobility. Will plan to continue current PT POC.

## 2021-07-24 NOTE — PLAN OF CARE
Goal Outcome Evaluation:           Progress: no change  Outcome Summary: VSS. Patient maintaining O2 saturations on 6L NC when at rest with O2 sats dropping into the 80's and shortness of breath noted with activity. Patient continues to cough and exhibit difficulty swallowing with food/liquids/meds even with ordered pureed diet with nectar thickened liquids. Discharge back to [facility] pending when stable.

## 2021-07-25 NOTE — THERAPY TREATMENT NOTE
Patient Name: Jani Pena  : 1950    MRN: [id number]                              Today's Date: 2021       Admit Date: 2021    Visit Dx:     ICD-10-CM ICD-9-CM   1. Acute on chronic respiratory failure with hypoxia and hypercapnia (CMS/HCC)  J96.21 518.84    J96.22 786.09     799.02   2. Acute on chronic congestive heart failure, unspecified heart failure type (CMS/HCC)  I50.9 428.0   3. COPD exacerbation (CMS/HCC)  J44.1 491.21   4. Anxiety  F41.9 300.00   5. Degeneration of cervical intervertebral disc  M50.30 722.4     Patient Active Problem List   Diagnosis   • Anxiety   • Atrial fibrillation (CMS/HCC)   • Chronic kidney disease   • Steroid-dependent COPD (CMS/HCC)   • Degeneration of cervical intervertebral disc   • Gastroesophageal reflux disease   • Diverticulosis   • Hemorrhoids   • Hiatal hernia   • Hyperlipidemia   • Kyphosis, acquired   • PEDRO on CPAP   • Benign prostatic hyperplasia with incomplete bladder emptying   • Depression   • History of ventricular septal defect   • Chronic diastolic congestive heart failure (CMS/HCC)   • Coronary artery disease involving native coronary artery of native heart with angina pectoris (CMS/HCC)   • Lumbar radiculopathy   • Anemia   • Cardiac pacemaker in situ   • Sick sinus syndrome (CMS/HCC)   • Chronic respiratory failure with hypoxia and hypercapnia (CMS/HCC)   • Other dysphagia   • Presbyesophagus   • Vitamin D deficiency, unspecified    • Palpitation   • Bilateral leg edema   • Acute on chronic respiratory failure with hypoxia and hypercapnia (CMS/HCC)     Past Medical History:   Diagnosis Date   • Abnormal liver enzymes    • Anemia    • Anxiety    • Arthritis    • Atherosclerotic heart disease of native coronary artery without angina pectoris    • Atrial fibrillation (CMS/HCC)    • Back pain    • BPH (benign prostatic hyperplasia)    • Cataract, bilateral    • Chest pain     2-3 MONTHS AGO.  PER PT, HAS ADDRESSED WITH CARDIOLOGIST.   STATES HAS NTG PRN.   • CHF (congestive heart failure) (CMS/HCC)    • Chronic bronchitis (CMS/HCC)    • Chronic kidney disease    • COPD (chronic obstructive pulmonary disease) (CMS/HCC)    • Degeneration of cervical intervertebral disc    • Depression    • Diverticulosis    • Dyspnea    • Esophageal reflux    • Esophagitis    • Gastritis    • Hematuria    • Hemorrhoids    • Hiatal hernia    • History of arthritis    • History of nuclear stress test     UNSURE OF DATE   • History of peripheral neuropathy    • History of ventricular septal defect    • History of ventricular septal defect    • Hyperlipidemia    • Hypertension    • Impaired functional mobility, balance, gait, and endurance    • Infectious diarrhea    • Kyphosis, acquired    • Lumbar radiculopathy    • Mitral and aortic valve disease    • Nephrolithiasis    • Phimosis    • Problems with swallowing     WITH FOOD   • Respiratory failure (CMS/HCC)    • Sleep apnea     BIPAP, too awkward to bring   • TIA (transient ischemic attack)     X3.   2014   • Ventral hernia    • Wears glasses     FOR READING     Past Surgical History:   Procedure Laterality Date   • CARDIAC CATHETERIZATION     • CARDIAC CATHETERIZATION N/A 5/24/2018    Procedure: Left Heart Cath;  Surgeon: Edouard Robertson MD;  Location:  [facility];  Service: Cardiovascular   • CARDIAC ELECTROPHYSIOLOGY PROCEDURE N/A 1/31/2019    Procedure: PACEMAKER IMPLANTATION- DC;  Surgeon: Omar Encinas DO;  Location:  [facility];  Service: Cardiology   • COLONOSCOPY N/A 12/28/2019    Procedure: COLONOSCOPY WITH HOT POLYPECTOMY;  Surgeon: Celio Maldonado MD;  [facility]:  [facility];  Service: Gastroenterology   • ENDOSCOPY N/A 1/26/2019    Procedure: ESOPHAGOGASTRODUODENOSCOPY;  Surgeon: Brunner, Mark I, MD;  Location:  [facility];  Service: Gastroenterology   • ENDOSCOPY N/A 12/27/2019    Procedure: ESOPHAGOGASTRODUODENOSCOPY;  Surgeon: Valdemar Mcdonald MD;   Location: [facility] ENDOSCOPY;  Service: General   • ENDOSCOPY N/A 10/20/2020    Procedure: ESOPHAGOGASTRODUODENOSCOPY WITH DILATION AND COLD FORCEP BIOPSY;  Surgeon: Cale Madrid MD;  Location: [facility] ENDOSCOPY;  Service: Gastroenterology;  Laterality: N/A;   • ESOPHAGEAL MOTILITY STUDY N/A 7/6/2021    Procedure: MANOMETRY;  Surgeon: Uriel Tinsley MD;  Location: [facility] ENDOSCOPY;  Service: Gastroenterology;  Laterality: N/A;   • HERNIA REPAIR     • ORTHOPEDIC SURGERY Left     Left hip arthroplasty   • PACEMAKER IMPLANTATION  01/31/2019   • SUPRAPUBIC CATHETER INSERTION      History of Percutaneous Catheter Placement Into Ureter   • THROAT SURGERY      FOR SLEEP APNEA   • VSD REPAIR      History of VSD Repair By Patch Closure     General Information     Row Name 07/25/21 1105          Physical Therapy Time and Intention    Document Type  therapy note (daily note)  -TW     Mode of Treatment  physical therapy  -TW     Row Name 07/25/21 1105          General Information    Existing Precautions/Restrictions  fall;oxygen therapy device and L/min  -TW     Row Name 07/25/21 1105          Cognition    Orientation Status (Cognition)  oriented x 4  -TW     Row Name 07/25/21 1105          Safety Issues, Functional Mobility    Safety Issues Affecting Function (Mobility)  insight into deficits/self-awareness;safety precaution awareness;safety precautions follow-through/compliance  -TW     Impairments Affecting Function (Mobility)  balance;endurance/activity tolerance;shortness of breath;strength;pain  -TW       User Key  (r) = Recorded By, (t) = Taken By, (c) = Cosigned By    Initials Name Provider Type    TW Jacqueline Horton, PT Physical Therapist        Mobility     Row Name 07/25/21 1105          Bed Mobility    Bed Mobility  supine-sit  -TW     Supine-Sit [place] (Bed Mobility)  modified independence  -TW     Assistive Device (Bed Mobility)  head of bed elevated;bed rails  -TW     Comment (Bed Mobility)  Pt was  able to complete supine to sitting on EOB by himself with HOB raised use of L bed rail and extended time.  -TW     Row Name 07/25/21 1105          Transfers    Comment (Transfers)  Cues for safety provided.  -TW     Row Name 07/25/21 1105          Bed-Chair Transfer    Bed-Chair [place] (Transfers)  contact guard;verbal cues  -TW     Assistive Device (Bed-Chair Transfers)  walker, front-wheeled  -TW     Row Name 07/25/21 1105          Sit-Stand Transfer    Sit-Stand [place] (Transfers)  contact guard;verbal cues  -TW     Assistive Device (Sit-Stand Transfers)  walker, front-wheeled  -TW     Row Name 07/25/21 1105          Gait/Stairs (Locomotion)    [place] Level (Gait)  contact guard  -TW     Assistive Device (Gait)  walker, front-wheeled  -TW     Distance in Feet (Gait)  5 ft  -TW     Deviations/Abnormal Patterns (Gait)  stride length decreased;weight shifting decreased;festinating/shuffling  -TW     Bilateral Gait Deviations  forward flexed posture;heel strike decreased  -TW     Comment (Gait/Stairs)  Pt was short of breath with amb to chair but O2 saturation remained 90% or above and quicker recovery today once sitting.  -TW       User Key  (r) = Recorded By, (t) = Taken By, (c) = Cosigned By    Initials Name Provider Type    TW Jacqueline Horton PT Physical Therapist        Obj/Interventions     Row Name 07/25/21 1105          Motor Skills    Therapeutic Exercise  knee Pt performed B TKE sitting in chair before having c/o increased abdominal pain and needing to stop.  -     Row Name 07/25/21 1105          Balance    Balance Assessment  sitting static balance;sitting dynamic balance;standing static balance;standing dynamic balance  -TW     Static Sitting Balance  WFL;unsupported;sitting, edge of bed  -TW     Dynamic Sitting Balance  WFL;unsupported;sitting, edge of bed  -TW     Static Standing Balance  WFL;supported  -TW     Dynamic Standing Balance  mild impairment;supported  -TW       User Key   (r) = Recorded By, (t) = Taken By, (c) = Cosigned By    Initials Name Provider Type    TW Jacqueline Horton, PT Physical Therapist        Goals/Plan    No documentation.       Clinical Impression     Row Name 07/25/21 1104          Pain    Additional Documentation  Pain Scale: Numbers Pre/Post-Treatment (Group)  -TW     Row Name 07/25/21 1104          Pain Scale: Numbers Pre/Post-Treatment    Pretreatment Pain Rating  1/10  -TW     Posttreatment Pain Rating  6/10  -TW     Pain Location - Side  Bilateral  -TW     Pain Location - Orientation  anterior  -TW     Pain Location  abdomen  -TW     Pre/Posttreatment Pain Comment  When pt went to stand up he had increased abdominal pain that went away. When he began LE ther ex this pain returned and did not ease. Nursing informed.  -TW     Pain Intervention(s)  Repositioned;Ambulation/increased activity  -TW     Row Name 07/25/21 1104          Plan of Care Review    Plan of Care Reviewed With  patient  -TW     Progress  improving  -TW     Outcome Summary  PT treatment completed this am with pt showing improvement in his O2 saturations as they remained above 90% throughout session. Pt does continue to become short of breath with activity but had quicker recovery with rest. Increased abdominal pain with LE ther ex and nursing informed. Supine to sit modifiied independent and tranfers and gait 5 ft with rolling walker and CGA. Cont current PT POC.  -TW     Row Name 07/25/21 1104          Vital Signs    Pre SpO2 (%)  94  -TW     O2 Delivery Pre Treatment  supplemental O2 6 L  -TW     Intra SpO2 (%)  90  -TW     O2 Delivery Intra Treatment  supplemental O2 6L  -TW     Post SpO2 (%)  93  -TW     O2 Delivery Post Treatment  supplemental O2 6 L  -TW     Pre Patient Position  Supine  -TW     Intra Patient Position  Standing  -TW     Post Patient Position  Sitting  -TW     Row Name 07/25/21 1104          Positioning and Restraints    Pre-Treatment Position  in bed  -TW     Post Treatment  Position  chair  -TW     In Chair  reclined;call light within reach;encouraged to call for assist;legs elevated  -TW       User Key  (r) = Recorded By, (t) = Taken By, (c) = Cosigned By    Initials Name Provider Type    Jacqueline Ochoa PT Physical Therapist        Outcome Measures     Row Name 07/25/21 1104          How much help from another person do you currently need...    Turning from your back to your side while in flat bed without using bedrails?  4  -TW     Moving from lying on back to sitting on the side of a flat bed without bedrails?  3  -TW     Moving to and from a bed to a chair (including a wheelchair)?  3  -TW     Standing up from a chair using your arms (e.g., wheelchair, bedside chair)?  3  -TW     Climbing 3-5 steps with a railing?  2  -TW     To walk in hospital room?  3  -TW     AM-PAC 6 Clicks Score (PT)  18  -TW     Row Name 07/25/21 1104          Functional Assessment    Outcome Measure Options  AM-PAC 6 Clicks Basic Mobility (PT)  -TW       User Key  (r) = Recorded By, (t) = Taken By, (c) = Cosigned By    Initials Name Provider Type    Jacqueline Ochoa PT Physical Therapist                       Physical Therapy Education                 Title: PT OT SLP Therapies (Done)     Topic: Physical Therapy (Done)     Point: Mobility training (Done)     Learning Progress Summary           Patient Acceptance, E,D, DU by TW at 7/25/2021 1104    Comment: Pt education for LE ther ex technique as well as improving safety with rolling walker for transfers and gait.    Acceptance, E,D, DU by TW at 7/24/2021 1056    Comment: Pt education for LE ther ex technique as well as using rolling walker for safe transfers/gait.    Acceptance, E,TB, VU by CC at 7/23/2021 1241    Acceptance, E,TB, VU by JR at 7/22/2021 1513    Comment: Role of PT and POC                   Point: Home exercise program (Done)     Learning Progress Summary           Patient Acceptance, E,D, DU by TW at 7/25/2021 1104    Comment: Pt  education for LE ther ex technique as well as improving safety with rolling walker for transfers and gait.    Acceptance, E,D, DU by  at 7/24/2021 1056    Comment: Pt education for LE ther ex technique as well as using rolling walker for safe transfers/gait.    Acceptance, E,TB, VU by  at 7/23/2021 1241    Acceptance, E,TB, VU by  at 7/22/2021 1513    Comment: Role of PT and POC                   Point: Body mechanics (Done)     Learning Progress Summary           Patient Acceptance, E,TB, VU by  at 7/23/2021 1241    Acceptance, E,TB, VU by  at 7/22/2021 1513    Comment: Role of PT and POC                   Point: Precautions (Done)     Learning Progress Summary           Patient Acceptance, E,TB, VU by  at 7/23/2021 1241    Acceptance, E,TB, VU by  at 7/22/2021 1513    Comment: Role of PT and POC                               User Key     Initials Effective Dates Name Provider Type Discipline     06/16/21 -  Kaylee Kurtz, PT Physical Therapist PT     06/16/21 -  [facility] RN Registered Nurse Nurse     06/16/21 -  Jacqueline Horton, PT Physical Therapist PT              PT Recommendation and Plan     Plan of Care Reviewed With: patient  Progress: improving  Outcome Summary: PT treatment completed this am with pt showing improvement in his O2 saturations as they remained above 90% throughout session. Pt does continue to become short of breath with activity but had quicker recovery with rest. Increased abdominal pain with LE ther ex and nursing informed. Supine to sit modifiied independent and tranfers and gait 5 ft with rolling walker and CGA. Cont current PT POC.     Time Calculation:   PT Charges     Row Name 07/25/21 1104             Time Calculation    Start Time  1045  -TW      Stop Time  1104  -TW      Time Calculation (min)  19 min  -TW      PT Received On  07/25/21  -TW         Timed Charges    05732 - PT Therapeutic Activity Minutes  19  -TW         Total Minutes    Timed  Charges Total Minutes  19  -TW       Total Minutes  19  -TW        User Key  (r) = Recorded By, (t) = Taken By, (c) = Cosigned By    Initials Name Provider Type    Jacqueline Ochoa PT Physical Therapist        Therapy Charges for Today     Code Description Service Date Service Provider Modifiers Qty    [id number]  PT THERAPEUTIC ACT EA 15 MIN 7/24/2021 Jacqueline Horton, PT GP 1    [id number]  PT THERAPEUTIC ACT EA 15 MIN 7/25/2021 Jacqueline Horton, PT GP 1          PT G-Codes  Outcome Measure Options: AM-PAC 6 Clicks Basic Mobility (PT)  AM-PAC 6 Clicks Score (PT): 18    Jacqueline Horton PT  7/25/2021

## 2021-07-25 NOTE — PROGRESS NOTES
[facility]    Name:  Jani Pena   Age:  71 y.o.  Sex:  male  :  1950  MRN:  [id number]   Visit Number:  [id number]  Admission Date:  2021  Date Of Service:  21  Primary Care Physician:  Miguel Rojas MD     LOS: 5 days :    Chief Complaint:      Shortness of breath    Subjective:    Patient seen and evaluated today.  Patient already feeling much better than he did yesterday.  Concerning yesterday due to continued hypoxia and apparent respiratory distress.  However, this morning patient was able to converse normally without use of accessory muscles.  He is on his baseline level of oxygen requirement.  Will hold on further diuresis and reinitiate Lasix tomorrow.  Have initiated fluid restriction and sodium restriction with diet.  Also continue antibiotics.    Hospital Course:    Patient is a 71 year old male who presented to the Emergency Department today with complaints of shortness of breath and increased leg swelling.  Patient was brought in by EMS after being found to have oxygen saturations in the 70s on home oxygen at 6L.  Patient with health history significant for chronic respiratory failure requiring 6L of oxygen all the time at baseline, COPD, CKD, Atrial Fibrillation, Chronic Diastolic Heart Failure, Anxiety, GERD, PEDRO on home Cpap, TIA, and Pacemaker placement.  Patient is a resident at the [facility] assisted living facility.  Patient reports that he began having increasing shortness of breath for the past 3-4 days with increased leg swelling as well.  Denies any fevers, change in chronic cough, denies feeling sick recently, no sick contacts, and has been fully vaccinated for Covid 19.  Given IV Lasix for diuresis on admission and placed on Bipap.  Patient with continued hypoxia.  Repeat chest x-ray from  showed bilateral pleural effusion still present though improved.  Patient still requiring significant amount of oxygen with  activity.  Use of accessory muscles.  Procalcitonin was minimally elevated.  Started on Rocephin and azithromycin.  Encourage incentive spirometer.    Review of Systems:     All systems were reviewed and negative except as mentioned in subjective, assessment and plan.    Vital Signs:    Temp:  [98.3 °F (36.8 °C)-99.4 °F (37.4 °C)] 98.6 °F (37 °C)  Heart Rate:  [62-91] 79  Resp:  [18-20] 20  BP: ()/(59-71) 103/69    Intake and output:    I/O last 3 completed shifts:  In: 1020 [P.O.:720; IV Piggyback:300]  Out: 3175 [Urine:3175]  I/O this shift:  In: 120 [P.O.:120]  Out: -     Physical Examination: Examined again 7/25    General Appearance:  Alert and cooperative.    Head:  Atraumatic and normocephalic.   Eyes: Conjunctivae and sclerae normal, no icterus. No pallor.   Throat: No oral lesions, no thrush, oral mucosa moist.   Neck: Supple, trachea midline, no thyromegaly.   Lungs:   Breath sounds heard bilaterally equally.  Positive crackles in the lung bases.  Some use of accessory muscles though mild   Heart:  Normal S1 and S2, no murmur, No JVD.   Abdomen:   Normal bowel sounds, Soft, nontender, nondistended, no rebound tenderness.   Extremities: Supple, no edema, no cyanosis, no clubbing.   Skin: No bleeding or rash.   Neurologic: Alert and oriented x 3. No facial asymmetry. Moves all four limbs.      Laboratory results:    Results from last 7 days   Lab Units 07/25/21  0554 07/24/21  0544 07/23/21  0850 07/22/21  0514 07/22/21  0514 07/21/21  0545 07/21/21  0545   SODIUM mmol/L 140 147* 143   < > 143   < > 143   POTASSIUM mmol/L 4.2 4.1 4.2   < > 4.2   < > 4.3   CHLORIDE mmol/L 90* 92* 96*   < > 94*   < > 94*   CO2 mmol/L 41.0* 44.3* 40.7*   < > 43.7*   < > 39.9*   BUN mg/dL 49* 44* 40*   < > 43*   < > 36*   CREATININE mg/dL 2.05* 1.93* 1.76*   < > 1.91*   < > 1.74*   CALCIUM mg/dL 8.7 9.5 9.0   < > 9.1   < > 8.9   BILIRUBIN mg/dL  --  1.4*  --   --  0.7  --  0.8   ALK PHOS U/L  --  88  --   --  76  --  83    ALT (SGPT) U/L  --  14  --   --  14  --  17   AST (SGOT) U/L  --  19  --   --  17  --  19   GLUCOSE mg/dL 113* 98 106*   < > 87   < > 130*    < > = values in this interval not displayed.     Results from last 7 days   Lab Units 07/25/21  0554 07/24/21  0544 07/23/21  0850   WBC 10*3/mm3 11.69* 9.65 8.21   HEMOGLOBIN g/dL 10.1* 11.1* 9.6*   HEMATOCRIT % 33.5* 37.5 32.7*   PLATELETS 10*3/mm3 216 254 234         Results from last 7 days   Lab Units 07/21/21  0545 07/20/21  1055   TROPONIN T ng/mL 0.022 0.050*         Results from last 7 days   Lab Units 07/20/21  1059   PH, ARTERIAL pH units 7.328*   PO2 ART mm Hg 86.9   PCO2, ARTERIAL mm Hg 71.0*   HCO3 ART mmol/L 37.2*     I have reviewed the patient's laboratory results.    Radiology results:    XR Chest 1 View    Result Date: 7/24/2021  PROCEDURE: XR CHEST 1 VW-  HISTORY: pulm edema, chf; J96.21-Acute and chronic respiratory failure with hypoxia; J96.22-Acute and chronic respiratory failure with hypercapnia; I50.9-Heart failure, unspecified; J44.1-Chronic obstructive pulmonary disease with (acute) exacerbation; F41.9-Anxiety disorder, unspecified; M50.30-Other cervical disc degeneration, unspecified cervical region  COMPARISON: 07/20/2021.  FINDINGS: A left subclavian pacemaker is in place. The heart is normal in size. The mediastinum is unremarkable. There has been interval improvement in the patient's interstitial edema. There are persistent small effusions. There is no pneumothorax.  There are no acute osseous abnormalities.      Impression: Improvement in patient's pulmonary edema with persistent small effusions.  Continued followup is recommended.  This report was finalized on 7/24/2021 12:34 PM by Richelle Patel M.D..    I have reviewed the patient's radiology reports.    Medication Review:     I have reviewed the patient's active and prn medications.     Problem List:      Acute on chronic respiratory failure with hypoxia and hypercapnia (CMS/HCC)     Anxiety    Atrial fibrillation (CMS/HCC)    Chronic kidney disease    Steroid-dependent COPD (CMS/HCC)    Gastroesophageal reflux disease    PEDRO on CPAP    Depression    Chronic diastolic congestive heart failure (CMS/HCC)    Coronary artery disease involving native coronary artery of native heart with angina pectoris (CMS/HCC)    Sick sinus syndrome (CMS/HCC)    Chronic respiratory failure with hypoxia and hypercapnia (CMS/HCC)      Assessment:    Acute on chronic respiratory failure with hypoxia and hypercapnia, POA  Acute on chronic diastolic heart failure, POA  Steroid dependent COPD on chronic home O2  Community-acquired pneumonia  CKD  Anxiety  Atrial Fibrillation  GERD  Depression  PEDRO on Cpap    Plan:    Continue to monitor in the hospital.  Patient feeling much better today.  Procalcitonin slightly elevated 7/24.  Chest x-ray not concerning for consolidation but still shows bilateral effusions though improved.  With elevated procalcitonin and continued hypoxia, started Rocephin and azithromycin.  Anticipate 5-day course.  Also encouraged incentive spirometer.  Given additional dose of Lasix IV yesterday for aggressive diuresis.  As expected, creatinine did bump slightly.  Will hold on further Lasix today and reinitiate tomorrow.  Continue to monitor strict I's and O's.  Facility does not accept patients back on Sunday so patient will at least be here until Monday.  Further orders as clinical course dictates.    DVT Prophylaxis: Lovenox  Code Status: DNR  Diet: Cardiac: Puréed, fluid and sodium restriction  Discharge Plan: Back to [facility] living once stable    Db Guillory DO  07/25/21  12:47 EDT    Dictated utilizing Dragon dictation.

## 2021-07-25 NOTE — PLAN OF CARE
Goal Outcome Evaluation:  Plan of Care Reviewed With: patient        Progress: no change  Outcome Summary: VSS. No acute events noted overnight.  Patient did sleep with bipap for several hours. Discharge pending for Monday back to the [facility].

## 2021-07-25 NOTE — PLAN OF CARE
Goal Outcome Evaluation:           Progress: no change  Outcome Summary: VSS. Patient maintaining O2 saturations >90% throughout shift. Patient exhibits decreased shortness of breath with activity. Patient c/o abdominal pain and no BM x2 days. Scheduled and PRN meds given. Discharge back to [facility] pending when stable.

## 2021-07-25 NOTE — PLAN OF CARE
Goal Outcome Evaluation:  Plan of Care Reviewed With: patient        Progress: improving  Outcome Summary: PT treatment completed this am with pt showing improvement in his O2 saturations as they remained above 90% throughout session. Pt does continue to become short of breath with activity but had quicker recovery with rest. Increased abdominal pain with LE ther ex and nursing informed. Supine to sit modifiied independent and tranfers and gait 5 ft with rolling walker and CGA. Cont current PT POC.

## 2021-07-26 NOTE — CASE MANAGEMENT/SOCIAL WORK
Continued Stay Note  [facility]     Patient Name: Jani Pena  MRN: [id number]  Today's Date: 7/26/2021    Admit Date: 7/20/2021    Discharge Plan     Row Name 07/26/21 1025       Plan    Plan Spoke to pt in room and does want to go back to [facility] at discharge.  Pt is still on o2 at 6 liters and that is his baseline. Cm will continue to follow.  IMM explained and signed by pt.  14:15  Received call from Anna and plans to discharge pt back to [facility] today. Updated Clinicals sent to Haleigh at [facility].  Called to Haleigh CANALES and informed would be discharge back.  Today's PT note read to Haleigh. Does not think she can provide transportation back today as it has to be a Director and does not know when one will be available.  Haleigh informed earlier pt was unable to get in touch with family.  Cm will speak with pt's nurse, could possibly qualify for EMS transport.  Called to [facility] and informed.  CM attempted to speak to pt but was sleeping soundly.    15:23 EDT   Spoke to Anna is sending rx to pharmacy to be filled.  CM spoke to Becka and informed.   Call report to [phone] and ask for Becka. Fax DCS to [phone]           Discharge Codes    No documentation.       Expected Discharge Date and Time     Expected Discharge Date Expected Discharge Time    Jul 26, 2021             Belgica Barry RN

## 2021-07-26 NOTE — THERAPY TREATMENT NOTE
Patient Name: Jani Pena  : 1950    MRN: [id number]                              Today's Date: 2021       Admit Date: 2021    Visit Dx:     ICD-10-CM ICD-9-CM   1. Acute on chronic respiratory failure with hypoxia and hypercapnia (CMS/HCC)  J96.21 518.84    J96.22 786.09     799.02   2. Acute on chronic congestive heart failure, unspecified heart failure type (CMS/HCC)  I50.9 428.0   3. COPD exacerbation (CMS/HCC)  J44.1 491.21   4. Anxiety  F41.9 300.00   5. Degeneration of cervical intervertebral disc  M50.30 722.4     Patient Active Problem List   Diagnosis   • Anxiety   • Atrial fibrillation (CMS/HCC)   • Chronic kidney disease   • Steroid-dependent COPD (CMS/HCC)   • Degeneration of cervical intervertebral disc   • Gastroesophageal reflux disease   • Diverticulosis   • Hemorrhoids   • Hiatal hernia   • Hyperlipidemia   • Kyphosis, acquired   • PEDRO on CPAP   • Benign prostatic hyperplasia with incomplete bladder emptying   • Depression   • History of ventricular septal defect   • Chronic diastolic congestive heart failure (CMS/HCC)   • Coronary artery disease involving native coronary artery of native heart with angina pectoris (CMS/HCC)   • Lumbar radiculopathy   • Anemia   • Cardiac pacemaker in situ   • Sick sinus syndrome (CMS/HCC)   • Chronic respiratory failure with hypoxia and hypercapnia (CMS/HCC)   • Other dysphagia   • Presbyesophagus   • Vitamin D deficiency, unspecified    • Palpitation   • Bilateral leg edema   • Acute on chronic respiratory failure with hypoxia and hypercapnia (CMS/HCC)     Past Medical History:   Diagnosis Date   • Abnormal liver enzymes    • Anemia    • Anxiety    • Arthritis    • Atherosclerotic heart disease of native coronary artery without angina pectoris    • Atrial fibrillation (CMS/HCC)    • Back pain    • BPH (benign prostatic hyperplasia)    • Cataract, bilateral    • Chest pain     2-3 MONTHS AGO.  PER PT, HAS ADDRESSED WITH CARDIOLOGIST.   STATES HAS NTG PRN.   • CHF (congestive heart failure) (CMS/HCC)    • Chronic bronchitis (CMS/HCC)    • Chronic kidney disease    • COPD (chronic obstructive pulmonary disease) (CMS/HCC)    • Degeneration of cervical intervertebral disc    • Depression    • Diverticulosis    • Dyspnea    • Esophageal reflux    • Esophagitis    • Gastritis    • Hematuria    • Hemorrhoids    • Hiatal hernia    • History of arthritis    • History of nuclear stress test     UNSURE OF DATE   • History of peripheral neuropathy    • History of ventricular septal defect    • History of ventricular septal defect    • Hyperlipidemia    • Hypertension    • Impaired functional mobility, balance, gait, and endurance    • Infectious diarrhea    • Kyphosis, acquired    • Lumbar radiculopathy    • Mitral and aortic valve disease    • Nephrolithiasis    • Phimosis    • Problems with swallowing     WITH FOOD   • Respiratory failure (CMS/HCC)    • Sleep apnea     BIPAP, too awkward to bring   • TIA (transient ischemic attack)     X3.   2014   • Ventral hernia    • Wears glasses     FOR READING     Past Surgical History:   Procedure Laterality Date   • CARDIAC CATHETERIZATION     • CARDIAC CATHETERIZATION N/A 5/24/2018    Procedure: Left Heart Cath;  Surgeon: Edouard Robertson MD;  Location:  [facility];  Service: Cardiovascular   • CARDIAC ELECTROPHYSIOLOGY PROCEDURE N/A 1/31/2019    Procedure: PACEMAKER IMPLANTATION- DC;  Surgeon: Omar Encinas DO;  Location:  [facility];  Service: Cardiology   • COLONOSCOPY N/A 12/28/2019    Procedure: COLONOSCOPY WITH HOT POLYPECTOMY;  Surgeon: Celio Maldonado MD;  [facility]:  [facility];  Service: Gastroenterology   • ENDOSCOPY N/A 1/26/2019    Procedure: ESOPHAGOGASTRODUODENOSCOPY;  Surgeon: Brunner, Mark I, MD;  Location:  [facility];  Service: Gastroenterology   • ENDOSCOPY N/A 12/27/2019    Procedure: ESOPHAGOGASTRODUODENOSCOPY;  Surgeon: Valdemar Mcdonald MD;   Location: [facility] ENDOSCOPY;  Service: General   • ENDOSCOPY N/A 10/20/2020    Procedure: ESOPHAGOGASTRODUODENOSCOPY WITH DILATION AND COLD FORCEP BIOPSY;  Surgeon: Cale Madrid MD;  Location: [facility] ENDOSCOPY;  Service: Gastroenterology;  Laterality: N/A;   • ESOPHAGEAL MOTILITY STUDY N/A 7/6/2021    Procedure: MANOMETRY;  Surgeon: Uriel Tinsley MD;  Location: [facility] ENDOSCOPY;  Service: Gastroenterology;  Laterality: N/A;   • HERNIA REPAIR     • ORTHOPEDIC SURGERY Left     Left hip arthroplasty   • PACEMAKER IMPLANTATION  01/31/2019   • SUPRAPUBIC CATHETER INSERTION      History of Percutaneous Catheter Placement Into Ureter   • THROAT SURGERY      FOR SLEEP APNEA   • VSD REPAIR      History of VSD Repair By Patch Closure     General Information     Row Name 07/26/21 1136          Physical Therapy Time and Intention    Document Type  therapy note (daily note)  -RM     Mode of Treatment  physical therapy  -RM     Row Name 07/26/21 1136          General Information    Patient Profile Reviewed  yes  -RM     Existing Precautions/Restrictions  fall;oxygen therapy device and L/min  -RM     Row Name 07/26/21 1136          Cognition    Orientation Status (Cognition)  oriented x 4  -RM     Row Name 07/26/21 1136          Safety Issues, Functional Mobility    Safety Issues Affecting Function (Mobility)  safety precaution awareness;safety precautions follow-through/compliance  -RM     Impairments Affecting Function (Mobility)  balance;endurance/activity tolerance;shortness of breath;strength  -RM       User Key  (r) = Recorded By, (t) = Taken By, (c) = Cosigned By    Initials Name Provider Type    RM Zenon Rothman, PTA Physical Therapy Assistant        Mobility     Row Name 07/26/21 1140          Bed Mobility    All Activities, [place] (Bed Mobility)  modified independence  -RM     Assistive Device (Bed Mobility)  head of bed elevated;bed rails  -     Row Name 07/26/21 1140          Sit-Stand  Transfer    Sit-Stand [place] (Transfers)  contact guard;minimum assist (75% patient effort);verbal cues  -RM     Assistive Device (Sit-Stand Transfers)  walker, front-wheeled  -RM     Row Name 07/26/21 1140          Gait/Stairs (Locomotion)    [place] Level (Gait)  contact guard;verbal cues  -RM     Assistive Device (Gait)  walker, front-wheeled  -RM     Distance in Feet (Gait)  3  -RM     Deviations/Abnormal Patterns (Gait)  stride length decreased;weight shifting decreased;festinating/shuffling  -RM     Bilateral Gait Deviations  forward flexed posture;heel strike decreased  -RM       User Key  (r) = Recorded By, (t) = Taken By, (c) = Cosigned By    Initials Name Provider Type    Zenon Epps, HELADIO Physical Therapy Assistant        Obj/Interventions     Row Name 07/26/21 1142          Motor Skills    Therapeutic Exercise  -- B LE bike 2 minutes fwd/bwd  -RM       User Key  (r) = Recorded By, (t) = Taken By, (c) = Cosigned By    Initials Name Provider Type    Zenon Epps, HELADIO Physical Therapy Assistant        Goals/Plan    No documentation.       Clinical Impression     Row Name 07/26/21 1144          Pain Scale: Numbers Pre/Post-Treatment    Pretreatment Pain Rating  0/10 - no pain  -RM     Posttreatment Pain Rating  0/10 - no pain  -RM     Row Name 07/26/21 1144          Plan of Care Review    Plan of Care Reviewed With  patient  -RM     Progress  improving  -RM     Outcome Summary  Pt tolerated treatment fair. Pt was very SOA with bed mobility and O2 saturation declined to 84% with bed mobility and transfer from bed to chair 3' cga /min a with rw.  Pt required approx 3 minutes to recover once sitting in bedside recliner.  Pt in sitting was able to perform 2 minutes fwd/bwd with B LE bike without rest.  See flowsheet for details  -RM     Row Name 07/26/21 1144          Vital Signs    Pre SpO2 (%)  91  -RM     O2 Delivery Pre Treatment  supplemental O2 6 lpm  -RM     Intra SpO2 (%)  84   -RM     O2 Delivery Intra Treatment  supplemental O2 6 lpm  -RM     Post SpO2 (%)  91  -RM     O2 Delivery Post Treatment  supplemental O2 6 lpm  -RM     Pre Patient Position  Supine  -RM     Intra Patient Position  Standing  -RM     Post Patient Position  Sitting  -RM     Row Name 07/26/21 1144          Positioning and Restraints    Pre-Treatment Position  in bed  -RM     Post Treatment Position  chair  -RM     In Chair  sitting;call light within reach;encouraged to call for assist;notified nsg  -RM       User Key  (r) = Recorded By, (t) = Taken By, (c) = Cosigned By    Initials Name Provider Type    Zenon Epps, HELADIO Physical Therapy Assistant        Outcome Measures     Row Name 07/26/21 1151          How much help from another person do you currently need...    Turning from your back to your side while in flat bed without using bedrails?  4  -RM     Moving from lying on back to sitting on the side of a flat bed without bedrails?  3  -RM     Moving to and from a bed to a chair (including a wheelchair)?  3  -RM     Standing up from a chair using your arms (e.g., wheelchair, bedside chair)?  3  -RM     Climbing 3-5 steps with a railing?  2  -RM     To walk in hospital room?  3  -RM     AM-PAC 6 Clicks Score (PT)  18  -RM     Row Name 07/26/21 1151          Functional Assessment    Outcome Measure Options  AM-PAC 6 Clicks Basic Mobility (PT)  -RM       User Key  (r) = Recorded By, (t) = Taken By, (c) = Cosigned By    Initials Name Provider Type    Zenon Epps, HELADIO Physical Therapy Assistant                       Physical Therapy Education                 Title: PT OT SLP Therapies (Done)     Topic: Physical Therapy (Done)     Point: Mobility training (Done)     Learning Progress Summary           Patient Acceptance, E,TB,D, VU,NR by  at 7/26/2021 1152    Comment: safety with transfers and use of rw.    Acceptance, E,D, DU by TW at 7/25/2021 1104    Comment: Pt education for LE ther ex technique as  well as improving safety with rolling walker for transfers and gait.    Acceptance, E,D, DU by TW at 7/24/2021 1056    Comment: Pt education for LE ther ex technique as well as using rolling walker for safe transfers/gait.    Acceptance, E,TB, VU by CC at 7/23/2021 1241    Acceptance, E,TB, VU by JR at 7/22/2021 1513    Comment: Role of PT and POC                   Point: Home exercise program (Done)     Learning Progress Summary           Patient Acceptance, E,D, DU by  at 7/25/2021 1104    Comment: Pt education for LE ther ex technique as well as improving safety with rolling walker for transfers and gait.    Acceptance, E,D, DU by  at 7/24/2021 1056    Comment: Pt education for LE ther ex technique as well as using rolling walker for safe transfers/gait.    Acceptance, E,TB, VU by CC at 7/23/2021 1241    Acceptance, E,TB, VU by  at 7/22/2021 1513    Comment: Role of PT and POC                   Point: Body mechanics (Done)     Learning Progress Summary           Patient Acceptance, E,TB, VU by  at 7/23/2021 1241    Acceptance, E,TB, VU by  at 7/22/2021 1513    Comment: Role of PT and POC                   Point: Precautions (Done)     Learning Progress Summary           Patient Acceptance, E,TB, VU by  at 7/23/2021 1241    Acceptance, E,TB, VU by  at 7/22/2021 1513    Comment: Role of PT and POC                               User Key     Initials Effective Dates Name Provider Type Discipline     06/16/21 -  Kaylee Kurtz, PT Physical Therapist PT     06/16/21 -  Mercy Em, RN Registered Nurse Nurse     06/16/21 -  Zenon Rothman, PTA Physical Therapy Assistant PT     06/16/21 -  Jacqueline Horton, PT Physical Therapist PT              PT Recommendation and Plan     Plan of Care Reviewed With: patient  Progress: improving  Outcome Summary: Pt tolerated treatment fair. Pt was very SOA with bed mobility and O2 saturation declined to 84% with bed mobility and transfer from bed to  chair 3' cga /min a with rw.  Pt required approx 3 minutes to recover once sitting in bedside recliner.  Pt in sitting was able to perform 2 minutes fwd/bwd with B LE bike without rest.  See flowsheet for details     Time Calculation:   PT Charges     Row Name 07/26/21 1153             Time Calculation    Start Time  1106  -RM      Stop Time  1132  -RM      Time Calculation (min)  26 min  -RM      PT Received On  07/26/21  -RM      PT Goal Re-Cert Due Date  08/01/21  -RM         Time Calculation- PT    Total Timed Code Minutes- PT  26 minute(s)  -RM         Timed Charges    15889 - PT Therapeutic Exercise Minutes  10  -RM      98300 - PT Therapeutic Activity Minutes  16  -RM         Total Minutes    Timed Charges Total Minutes  26  -RM       Total Minutes  26  -RM        User Key  (r) = Recorded By, (t) = Taken By, (c) = Cosigned By    Initials Name Provider Type    Zenon Epps, HELADIO Physical Therapy Assistant        Therapy Charges for Today     Code Description Service Date Service Provider Modifiers Qty    15038794790 HC PT THER PROC EA 15 MIN 7/26/2021 Zenon Rothman, PTA GP 1    39294458684 HC PT THERAPEUTIC ACT EA 15 MIN 7/26/2021 Zenon Rothman, PTA GP 1          PT G-Codes  Outcome Measure Options: AM-PAC 6 Clicks Basic Mobility (PT)  AM-PAC 6 Clicks Score (PT): 18    Zenon Rothman PTA  7/26/2021

## 2021-07-26 NOTE — PROGRESS NOTES
[facility]    Name:  Jani Pena   Age:  71 y.o.  Sex:  male  :  1950  MRN:  [id number]   Visit Number:  [id number]  Admission Date:  2021  Date Of Service:  21  Primary Care Physician:  Miguel Rojas MD     LOS: 6 days :    Chief Complaint:      Shortness of breath follow up     Subjective:    Patient seen and evaluated today.  States that his breathing good and he feels pretty close to baseline.  He is currently utilizing 6L of oxygen via nasal cannula with stable oxygen saturations at around 90%.  Diet restrictions have been placed.   Patient complaining of dysuria which he says has been present for a couple days.        Hospital Course:    Patient is a 71 year old male who presented to the Emergency Department today with complaints of shortness of breath and increased leg swelling.  Patient was brought in by EMS after being found to have oxygen saturations in the 70s on home oxygen at 6L.  Patient with health history significant for chronic respiratory failure requiring 6L of oxygen all the time at baseline, COPD, CKD, Atrial Fibrillation, Chronic Diastolic Heart Failure, Anxiety, GERD, PEDRO on home Cpap, TIA, and Pacemaker placement.  Patient is a resident at the [facility] assisted living [facility].  Patient reports that he began having increasing shortness of breath for the past 3-4 days with increased leg swelling as well.  Denies any fevers, change in chronic cough, denies feeling sick recently, no sick contacts, and has been fully vaccinated for Covid 19.  Given IV Lasix for diuresis on admission and placed on Bipap.  Patient with continued hypoxia.  Repeat chest x-ray from  showed bilateral pleural effusion still present though improved.  Patient still requiring significant amount of oxygen with activity.  Use of accessory muscles.  Procalcitonin was minimally elevated.  Started on Rocephin and azithromycin.  Encourage incentive  spirometer.    Review of Systems:     All systems were reviewed and negative except as mentioned in subjective, assessment and plan.    Vital Signs:    Temp:  [98.1 °F (36.7 °C)-98.9 °F (37.2 °C)] 98.6 °F (37 °C)  Heart Rate:  [] 88  Resp:  [12-20] 18  BP: (103-132)/(58-75) 124/62    Intake and output:    I/O last 3 completed shifts:  In: 1140 [P.O.:840; IV Piggyback:300]  Out: 1820 [Urine:1820]  I/O this shift:  In: 480 [P.O.:480]  Out: 500 [Urine:500]    Physical Examination:    General Appearance:  Alert and cooperative, resting in bed on exam with no distress noted.  Pleasant elderly male.   Head:  Atraumatic and normocephalic.   Eyes: Conjunctivae and sclerae normal, no icterus. No pallor.   Throat: No oral lesions, no thrush, oral mucosa moist.   Neck: Supple, trachea midline   Lungs:   Breath sounds heard bilaterally equally.  Crackles to bases without wheezing. No accessory muscle use.   Heart:  Normal S1 and S2, no murmur, no gallop, no rub. No JVD.   Abdomen:   Normal bowel sounds, no masses, no organomegaly. Soft, nontender, nondistended, no rebound tenderness.   Extremities: Supple, no edema, no cyanosis, no clubbing.   Skin: No bleeding or rash.   Neurologic: Alert and oriented x 3. No facial asymmetry. Moves all four limbs. No tremors.      Laboratory results:    Results from last 7 days   Lab Units 07/26/21  0547 07/25/21  0554 07/24/21  0544 07/23/21  0850 07/22/21  0514 07/21/21  0545 07/21/21  0545   SODIUM mmol/L 143 140 147*   < > 143   < > 143   POTASSIUM mmol/L 4.1 4.2 4.1   < > 4.2   < > 4.3   CHLORIDE mmol/L 91* 90* 92*   < > 94*   < > 94*   CO2 mmol/L 42.3* 41.0* 44.3*   < > 43.7*   < > 39.9*   BUN mg/dL 54* 49* 44*   < > 43*   < > 36*   CREATININE mg/dL 1.80* 2.05* 1.93*   < > 1.91*   < > 1.74*   CALCIUM mg/dL 9.3 8.7 9.5   < > 9.1   < > 8.9   BILIRUBIN mg/dL  --   --  1.4*  --  0.7  --  0.8   ALK PHOS U/L  --   --  88  --  76  --  83   ALT (SGPT) U/L  --   --  14  --  14  --  17    AST (SGOT) U/L  --   --  19  --  17  --  19   GLUCOSE mg/dL 97 113* 98   < > 87   < > 130*    < > = values in this interval not displayed.     Results from last 7 days   Lab Units 07/26/21  0547 07/25/21  0554 07/24/21  0544   WBC 10*3/mm3 12.00* 11.69* 9.65   HEMOGLOBIN g/dL 10.3* 10.1* 11.1*   HEMATOCRIT % 34.8* 33.5* 37.5   PLATELETS 10*3/mm3 210 216 254         Results from last 7 days   Lab Units 07/21/21  0545 07/20/21  1055   TROPONIN T ng/mL 0.022 0.050*         Results from last 7 days   Lab Units 07/20/21  1059   PH, ARTERIAL pH units 7.328*   PO2 ART mm Hg 86.9   PCO2, ARTERIAL mm Hg 71.0*   HCO3 ART mmol/L 37.2*     I have reviewed the patient's laboratory results.    Radiology results:    XR Chest 1 View    Result Date: 7/24/2021  PROCEDURE: XR CHEST 1 VW-  HISTORY: pulm edema, chf; J96.21-Acute and chronic respiratory failure with hypoxia; J96.22-Acute and chronic respiratory failure with hypercapnia; I50.9-Heart failure, unspecified; J44.1-Chronic obstructive pulmonary disease with (acute) exacerbation; F41.9-Anxiety disorder, unspecified; M50.30-Other cervical disc degeneration, unspecified cervical region  COMPARISON: 07/20/2021.  FINDINGS: A left subclavian pacemaker is in place. The heart is normal in size. The mediastinum is unremarkable. There has been interval improvement in the patient's interstitial edema. There are persistent small effusions. There is no pneumothorax.  There are no acute osseous abnormalities.      Impression: Improvement in patient's pulmonary edema with persistent small effusions.  Continued followup is recommended.  This report was finalized on 7/24/2021 12:34 PM by Richelle Patel M.D..    I have reviewed the patient's radiology reports.    Medication Review:     I have reviewed the patient's active and prn medications.     Problem List:      Acute on chronic respiratory failure with hypoxia and hypercapnia (CMS/HCC)    Anxiety    Atrial fibrillation (CMS/HCC)     Chronic kidney disease    Steroid-dependent COPD (CMS/HCC)    Gastroesophageal reflux disease    PEDRO on CPAP    Depression    Chronic diastolic congestive heart failure (CMS/HCC)    Coronary artery disease involving native coronary artery of native heart with angina pectoris (CMS/HCC)    Sick sinus syndrome (CMS/HCC)    Chronic respiratory failure with hypoxia and hypercapnia (CMS/[facility])      Assessment:    Acute on chronic respiratory failure with hypoxia and hypercapnia, POA  Acute on chronic diastolic heart failure, POA  Steroid dependent COPD on chronic home O2  Community-acquired pneumonia  CKD  Anxiety  Atrial Fibrillation  GERD  Depression  PEDRO on Cpap    Plan:    Send urine for urinalysis and culture.  Patient does state his breathing is close to baseline.  Continue IV antibtiotics for 5 day course.  Continue incentive spirometer.  Continue bowel regimen for bowel movement.  PO Lasix restarted today.  Monitor I&Os today to ensure diuresis is adequate.  Kidney function improved today.  Continue dietary restrictions.  Plan to discharge back to facility tomorrow.      DVT Prophylaxis: Lovenox  Code Status: DNR  Diet: Cardiac: Puréed, fluid and sodium restriction  Discharge Plan: Back to [facility] assisted living once stable    Anna Piña, PAL  07/26/21  12:21 EDT    Dictated utilizing Dragon dictation.

## 2021-07-26 NOTE — PLAN OF CARE
Goal Outcome Evaluation:  Plan of Care Reviewed With: patient        Progress: improving  Outcome Summary: Pt tolerated treatment fair. Pt was very SOA with bed mobility and O2 saturation declined to 84% with bed mobility and transfer from bed to chair 3' cga /min a with rw.  Pt required approx 3 minutes to recover once sitting in bedside recliner.  Pt in sitting was able to perform 2 minutes fwd/bwd with B LE bike without rest.  See flowsheet for details

## 2021-07-26 NOTE — DISCHARGE SUMMARY
[facility]   DISCHARGE SUMMARY      Name:  Jani Pena   Age:  71 y.o.  Sex:  male  :  1950  MRN:  [id number]   Visit Number:  [id number]    Admission Date:  2021  Date of Discharge:  2021  Primary Care Physician:  Miguel Rojas MD    Important issues to note:    1.  Admitted to the hospital for volume overload and given IV diuresis and placed on Bipap.      2.  Repeat CXR on  noted improved bilateral pleural effusions.  Procalcitonin elevated so patient was started on Rocephin and Azithromycin.      3.  Breathing back to baseline today with some complaints of dysuria.  Stable to discharge home today to complete 5 days of Azithromycin and additional 5 days of Cefdinir for pneumonia and UTI coverage.  Currently utilizing baseline 6L of O2.  Will increase PO Lasix to 40mg BID at discharge with a mandatory follow up with Dr. Rojas in 2 days for a BMP recheck and titration of Lasix PO if needed.        Discharge Diagnoses:     Active Hospital Problems    Diagnosis  POA   • **Acute on chronic respiratory failure with hypoxia and hypercapnia (CMS/HCC) [J96.21, J96.22]  Yes   • Chronic respiratory failure with hypoxia and hypercapnia (CMS/HCC) [J96.11, J96.12]  Yes   • Sick sinus syndrome (CMS/HCC) [I49.5]  Yes   • Coronary artery disease involving native coronary artery of native heart with angina pectoris (CMS/HCC) [I25.119]  Yes   • Chronic diastolic congestive heart failure (CMS/HCC) [I50.32]  Yes   • Depression [F32.9]  Yes   • Atrial fibrillation (CMS/HCC) [I48.91]  Yes   • Anxiety [F41.9]  Yes   • Chronic kidney disease [N18.9]  Yes   • 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     Presenting Problem:    Chief Complaint   Patient presents with   • Shortness of Breath      Consults:      Consulting Physician(s)             None          History of presenting illness/Hospital Course:    Patient is a 71 year old male who presented to the Emergency Department 7/20/2021 with complaints of shortness of breath and increased leg swelling.  Patient was brought in by EMS after being found to have oxygen saturations in the 70s on home oxygen at 6L.  Patient with health history significant for chronic respiratory failure requiring 6L of oxygen all the time at baseline, COPD, CKD, Atrial Fibrillation, Chronic Diastolic Heart Failure, Anxiety, GERD, PEDRO on home Cpap, TIA, and Pacemaker placement.  Patient is a resident at the [facility].  Patient reported that he began having increasing shortness of breath for the past 3-4 days prior to admission with increased leg swelling as well.  Denied any fevers, change in chronic cough, denies feeling sick recently, no sick contacts, and has been fully vaccinated for Covid 19.      Patient was given IV Lasix for diuresis on admission and placed on Bipap.  Patient with continued hypoxia worse with exertion and productive cough. Repeat chest x-ray from 7/24 showed bilateral pleural effusion still present though improved.  Patient still requiring significant amount of oxygen with activity and had noted use of accessory muscles.  Procalcitonin was minimally elevated.  Patient was started on Rocephin and Azithromycin IV for pneumonia coverage and continued diuresis.  Patient did improve per PT notes and patient reports that his breathing is back to baseline today.  Patient did have complaints of dysuria today which he stated had been present for a couple days.  Patient was restarted on Lasix PO today with increased dose of 40mg BID.  UA today mildly infected with 1+ bacteria, 3+ leuks, and negative nitrites.  Patient was also noted to have coughing spells during admission related to dysphagia and will need continued follow up with GI who is  following patient.  Consulted Speech during admission who advised his current diet was appropriate for his known esophageal problems.    Patient is stable for discharge back to assisted living facility today.  This was an exacerbation of chronic diastolic heart failure.  Will continue increased Lasix dose at discharge and have close follow up with Dr. Rojas at discharge for recheck BMP in 2 days and titration of PO Lasix if needed.  Will discharge patient home to complete total of 5 days of Azithromycin and additional 5 days of Cefdinir to cover for pneumonia and UTI infections.  Patient will continue home oxygen at discharge as well as home Cpap.  Instructed on need to use home machine as instructed to keep breathing adequate.  Patient instructed to return to the hospital with any increased oxygen usage, increased shortness of breath, increased swelling, or chest pains.  Stable for discharge back to assisted living today with resumption of home medications.      Vital Signs:    Temp:  [98.1 °F (36.7 °C)-98.9 °F (37.2 °C)] 98.6 °F (37 °C)  Heart Rate:  [] 84  Resp:  [12-20] 18  BP: (103-132)/(58-75) 124/62    Physical Exam:    General Appearance:  Alert and cooperative, resting in bed on exam with no distress noted.  Pleasant elderly male.   Head:  Atraumatic and normocephalic.   Eyes: Conjunctivae and sclerae normal, no icterus. No pallor.   Ears:  Ears with no abnormalities noted.   Throat: No oral lesions, no thrush, oral mucosa moist.   Neck: Supple, trachea midline   Back:   No kyphoscoliosis present. No tenderness to palpation.   Lungs:   Breath sounds heard bilaterally equally.  Trace crackles to bases without wheezing. No accessory muscle use.   Heart:  Normal S1 and S2, no murmur, no gallop, no rub. No JVD.   Abdomen:   Normal bowel sounds, no masses, no organomegaly. Soft, nontender, nondistended, no rebound tenderness.   Extremities: Supple, no edema, no cyanosis, no clubbing.   Pulses: Pulses  palpable bilaterally.   Skin: No bleeding or rash.   Neurologic: Alert and oriented x 3. No facial asymmetry. Moves all four limbs.      Pertinent Lab Results:     Results from last 7 days   Lab Units 07/26/21  0547 07/25/21  0554 07/24/21  0544 07/23/21  0850 07/22/21  0514 07/21/21  0545 07/21/21  0545   SODIUM mmol/L 143 140 147*   < > 143   < > 143   POTASSIUM mmol/L 4.1 4.2 4.1   < > 4.2   < > 4.3   CHLORIDE mmol/L 91* 90* 92*   < > 94*   < > 94*   CO2 mmol/L 42.3* 41.0* 44.3*   < > 43.7*   < > 39.9*   BUN mg/dL 54* 49* 44*   < > 43*   < > 36*   CREATININE mg/dL 1.80* 2.05* 1.93*   < > 1.91*   < > 1.74*   CALCIUM mg/dL 9.3 8.7 9.5   < > 9.1   < > 8.9   BILIRUBIN mg/dL  --   --  1.4*  --  0.7  --  0.8   ALK PHOS U/L  --   --  88  --  76  --  83   ALT (SGPT) U/L  --   --  14  --  14  --  17   AST (SGOT) U/L  --   --  19  --  17  --  19   GLUCOSE mg/dL 97 113* 98   < > 87   < > 130*    < > = values in this interval not displayed.     Results from last 7 days   Lab Units 07/26/21  0547 07/25/21  0554 07/24/21  0544   WBC 10*3/mm3 12.00* 11.69* 9.65   HEMOGLOBIN g/dL 10.3* 10.1* 11.1*   HEMATOCRIT % 34.8* 33.5* 37.5   PLATELETS 10*3/mm3 210 216 254         Results from last 7 days   Lab Units 07/21/21  0545 07/20/21  1055   TROPONIN T ng/mL 0.022 0.050*     Results from last 7 days   Lab Units 07/20/21  1055   PROBNP pg/mL 8,311.0*             Results from last 7 days   Lab Units 07/20/21  1059   PH, ARTERIAL pH units 7.328*   PO2 ART mm Hg 86.9   PCO2, ARTERIAL mm Hg 71.0*   HCO3 ART mmol/L 37.2*           Pertinent Radiology Results:    Imaging Results (All)     Procedure Component Value Units Date/Time    XR Chest 1 View [[id number]] Collected: 07/24/21 1233     Updated: 07/24/21 1236    Narrative:      PROCEDURE: XR CHEST 1 VW-     HISTORY: pulm edema, chf; J96.21-Acute and chronic respiratory failure  with hypoxia; J96.22-Acute and chronic respiratory failure with  hypercapnia; I50.9-Heart failure, unspecified;  J44.1-Chronic obstructive  pulmonary disease with (acute) exacerbation; F41.9-Anxiety disorder,  unspecified; M50.30-Other cervical disc degeneration, unspecified  cervical region     COMPARISON: 07/20/2021.     FINDINGS: A left subclavian pacemaker is in place. The heart is normal  in size. The mediastinum is unremarkable. There has been interval  improvement in the patient's interstitial edema. There are persistent  small effusions. There is no pneumothorax.  There are no acute osseous  abnormalities.       Impression:      Improvement in patient's pulmonary edema with persistent  small effusions.     Continued followup is recommended.     This report was finalized on 7/24/2021 12:34 PM by Richelle Patel M.D..    XR Chest 1 View [[id number]] Collected: 07/20/21 1124     Updated: 07/20/21 1130    Narrative:      PROCEDURE: XR CHEST 1 VW-     HISTORY: CHF/COPD Protocol     COMPARISON: 03/18/2021.     FINDINGS: A left subclavian pacemaker is in place. The heart is  enlarged. The mediastinum is unremarkable. There is interstitial  pulmonary edema and small effusions. There is no pneumothorax.  There  are no acute osseous abnormalities.       Impression:      Cardiomegaly with interstitial edema and small effusions.     Continued followup is recommended.     This report was finalized on 7/20/2021 11:27 AM by Richelle Patel M.D..          Echo:    Results for orders placed in visit on 12/29/20    Adult Transthoracic Echo Complete W/ Cont if Necessary Per Protocol    Interpretation Summary  · Estimated left ventricular EF = 25% Left ventricular systolic function is moderately decreased.  · The left ventricular cavity is severely dilated.  · Moderate mitral valve regurgitation is present.  · Moderate aortic valve regurgitation is present.    Condition on Discharge:      Stable.    Code status during the hospital stay:    Code Status and Medical Interventions:   Ordered at: 07/20/21 0542     Limited Support to NOT  Include:    Intubation     Level Of Support Discussed With:    Patient     Code Status:    CPR     Medical Interventions (Level of Support Prior to Arrest):    Limited     Comments:    does not want ventilator or intubation     Discharge Disposition:    Skilled Nursing Facility (DC - External)    Discharge Medications:       Discharge Medications      New Medications      Instructions Start Date   azithromycin 250 MG tablet  Commonly known as: Zithromax   To complete 5 day course      cefdinir 300 MG capsule  Commonly known as: OMNICEF   300 mg, Oral, 2 Times Daily         Changes to Medications      Instructions Start Date   furosemide 20 MG tablet  Commonly known as: LASIX  What changed:   · how to take this  · when to take this   TAKE ONE TABLET BY MOUTH DAILY      furosemide 40 MG tablet  Commonly known as: LASIX  What changed: You were already taking a medication with the same name, and this prescription was added. Make sure you understand how and when to take each.   40 mg, Oral, 2 Times Daily         Continue These Medications      Instructions Start Date   Acetaminophen Extra Strength 500 MG tablet  Generic drug: acetaminophen   TAKE 1 TABLET BY MOUTH EVERY 6 HOURS AS NEEDED FOR MILD PAIN      alfuzosin 10 MG 24 hr tablet  Commonly known as: UROXATRAL   @@ TAKE ONE TABLET BY MOUTH DAILY      ALPRAZolam 0.25 MG tablet  Commonly known as: XANAX   0.25 mg, Oral, 2 Times Daily PRN      Aspirin Low Dose 81 MG chewable tablet  Generic drug: aspirin   @@ TAKE ONE TABLET BY MOUTH DAILY      atorvastatin 10 MG tablet  Commonly known as: LIPITOR   10 mg, Oral, Every Evening      benzonatate 200 MG capsule  Commonly known as: TESSALON   200 mg, Oral, 3 Times Daily PRN      carboxymethylcellulose 0.5 % solution  Commonly known as: REFRESH PLUS   1 drop, Both Eyes, 3 Times Daily PRN      clopidogrel 75 MG tablet  Commonly known as: PLAVIX   TAKE ONE TABLET BY MOUTH DAILY      cyclobenzaprine 5 MG tablet  Commonly known  as: FLEXERIL   5 mg, Oral, 3 Times Daily PRN      dilTIAZem  MG 24 hr capsule  Commonly known as: CARDIZEM CD   TAKE ONE CAPSULE BY MOUTH DAILY      diphenhydrAMINE-zinc acetate 2-0.1 % cream   Topical, 3 Times Daily PRN      docusate sodium 100 MG capsule  Commonly known as: COLACE   100 mg, Oral, 2 Times Daily PRN      escitalopram 20 MG tablet  Commonly known as: LEXAPRO   @@ TAKE ONE TABLET BY MOUTH DAILY      famotidine 40 MG tablet  Commonly known as: PEPCID   TAKE ONE TABLET BY MOUTH EVERY DAY      ferrous sulfate 325 (65 FE) MG tablet   325 mg, Oral, Daily With Breakfast      finasteride 5 MG tablet  Commonly known as: PROSCAR   TAKE ONE TABLET BY MOUTH DAILY      fluocinonide 0.05 % cream  Commonly known as: LIDEX   Topical, 2 Times Daily      HYDROcodone-acetaminophen 5-325 MG per tablet  Commonly known as: NORCO   1 tablet, Oral, Every 8 Hours PRN      hydrocortisone 1 % cream   1 application, Topical, 2 Times Daily      ipratropium 0.06 % nasal spray  Commonly known as: Atrovent   2 sprays, Nasal, 4 Times Daily      ipratropium-albuterol 0.5-2.5 mg/3 ml nebulizer  Commonly known as: DUO-NEB   3 mL, Nebulization, Every 4 Hours PRN      lidocaine 5 %  Commonly known as: LIDODERM   1 patch, Transdermal, Every 24 Hours, Remove & Discard patch within 12 hours or as directed by MD      loperamide 2 MG capsule  Commonly known as: IMODIUM   2 mg, Oral, 4 Times Daily PRN      magnesium citrate 1.745 GM/30ML solution solution   15 mL, Oral, Daily PRN      melatonin 5 MG tablet tablet   @@ TAKE 1 TABLET BY MOUTH EVERY EVENING      mirtazapine 15 MG tablet  Commonly known as: REMERON   TAKE 1 TABLET BY MOUTH DAILY AT BEDTIME      oxybutynin 5 MG tablet  Commonly known as: DITROPAN   TAKE 1 TABLET BY MOUTH DAILY AT BEDTIME      pantoprazole 40 MG EC tablet  Commonly known as: PROTONIX   TAKE ONE TABLET BY MOUTH DAILY      polyethylene glycol 17 g packet  Commonly known as: MIRALAX   17 g, Oral, 2 Times Daily       potassium chloride 10 MEQ CR tablet   TAKE ONE TABLET BY MOUTH DAILY      predniSONE 10 MG tablet  Commonly known as: DELTASONE   TAKE ONE TABLET BY MOUTH DAILY      promethazine 25 MG tablet  Commonly known as: PHENERGAN   TAKE 1 TABLET BY MOUTH EVERY 6 HOURS AS NEEDED FOR NAUSEA AND VOMITING      sodium chloride 0.65 % nasal spray   1 spray, Nasal, As Needed      Stimulant Laxative 8.6-50 MG per tablet  Generic drug: sennosides-docusate   @@TAKE TWO TABLETS BY MOUTH TWO TIMES A DAY      traZODone 50 MG tablet  Commonly known as: DESYREL   TAKE 1 TABLET BY MOUTH EVERY EVENING      Trelegy Ellipta 100-62.5-25 MCG/INH inhaler  Generic drug: Fluticasone-Umeclidin-Vilant   INHALE ONE PUFF BY MOUTH EVERY DAY         Stop These Medications    O2  Commonly known as: OXYGEN        ASK your doctor about these medications      Instructions Start Date   O2  Commonly known as: OXYGEN  Ask about: Should I take this medication?   4 L/min (4 L/min), Inhalation, Once, Walking on 4L and sitting 3L           Discharge Diet:     Diet Instructions     Diet: Dysphagia, Specialty Diet; Nectar / Syrup Thick Liquids; Pureed; Low Sodium      Discharge Diet:  Dysphagia  Specialty Diet       Fluid Consistency: Nectar / Syrup Thick Liquids    Pureed Options: Pureed    Specialty Diets: Low Sodium    Fluid Restriction per day: 1500 mL Fluid    Diet: Regular      Discharge Diet: Regular    Nectar thick pureed, low sodium        Activity at Discharge:     Activity Instructions     Activity as Tolerated          Follow-up Appointments:    Additional Instructions for the Follow-ups that You Need to Schedule     Discharge Follow-up with PCP   As directed       Currently Documented PCP:    Miguel Rojas MD    PCP Phone Number:    [phone]     Follow Up Details: 1-2 weeks           Follow-up Information     Miguel Rojas MD .    Specialty: Internal Medicine  Why: 1-2 weeks  Contact information:  [address]  [facility] [place]  [address]  [phone]                 Future Appointments   Date Time Provider Department Center   8/5/2021 11:30 AM Yaquelin Tillman APRN [facility]IOR J[facility]IOR   10/7/2021  3:30 PM Miguel Rojas MD [facility]   12/23/2021 10:00 AM Becka Clay APRN [facility] JUNIOR   1/3/2022 10:30 AM Ulysses Bass MD [facility] JUNIOR JUNIOR   2/7/2022 11:00 AM Slim Toribio MD [facility] JUNIOR JUNIOR     Test Results Pending at Discharge:    Pending Labs     Order Current Status    Urine Culture - Urine, Urine, Clean Catch In process             PAL Martinez  07/26/21  15:19 EDT    Time: I spent 28 minutes on this discharge activity which included: face-to-face encounter with the patient, reviewing the data in the system, coordination of the care with the nursing staff as well as consultants, documentation, and entering orders.     Dictated utilizing Dragon dictation.

## 2021-07-27 NOTE — CASE MANAGEMENT/SOCIAL WORK
Case Management Discharge Note           Provided Post Acute Provider List?: N/A  Provided Post Acute Provider Quality & Resource List?: N/A  N/A Quality & Resource List Comment: Pt currently has Resp a Care for BiPAP and O2        Community & DME    No services have been selected for the patient.                  Transportation Services  Ambulance: Hermelinda Patel    Final Discharge Disposition Code: 01 - home or self-care   [facility]

## 2021-07-28 PROBLEM — J96.02 ACUTE RESPIRATORY FAILURE WITH HYPERCAPNIA (HCC): Status: ACTIVE | Noted: 2021-01-01

## 2021-07-29 PROBLEM — K80.20 CALCULUS OF GALLBLADDER WITHOUT CHOLECYSTITIS WITHOUT OBSTRUCTION: Status: ACTIVE | Noted: 2021-01-01

## 2021-07-30 PROBLEM — J15.212 PNEUMONIA OF BOTH LOWER LOBES DUE TO METHICILLIN RESISTANT STAPHYLOCOCCUS AUREUS (MRSA) (HCC): Status: ACTIVE | Noted: 2021-01-01

## 2021-07-30 PROBLEM — R78.81 MRSA BACTEREMIA: Status: ACTIVE | Noted: 2021-01-01

## 2021-07-30 PROBLEM — B95.62 MRSA BACTEREMIA: Status: ACTIVE | Noted: 2021-01-01

## 2021-08-01 NOTE — PLAN OF CARE
Goal Outcome Evaluation:  Plan of Care Reviewed With: patient        Progress: no change  Outcome Summary: PT treatment completed bedside this pm with pt presenting on EOB with nursing just completing transfer to bed from chair. Pt agreed to PT treatment with  AAROM for BLE 2 x 5 performed including: hip abd, heelslides, ankle pumps with pt being very drowsy and inconsistent with participation. Pt did have c/o pain with R hip movement and B ankle tremors noted with heelcord stretching. Pt was then positioned on the R side rolling with mod assist to take pressure of Buttocks. Cont PT POC as pt is able to tolerate.

## 2021-08-01 NOTE — PROGRESS NOTES
[facility]    Name:  Jani Pena   Age:  71 y.o.  Sex:  male  :  1950  MRN:  [id number]   Visit Number:  [id number]  Admission Date:  2021  Date Of Service:  21  Primary Care Physician:  Miguel Rojas MD     LOS: 4 days :    Chief Complaint:      Generalized weakness and lower abdominal pain.    Subjective:    Mr. Pena was seen and examined this morning.  He is currently sitting up on the bed and is significantly fatigued and complains of generalized weakness.  Not very interested in getting out of bed.  Denies any nausea at this time.  No no fevers.  He had 1280 mL of urine output in the last 24 hours.  Patient also has had intermittent nonsustained ventricular tachycardia on his telemetry.  His abdominal pain is slightly better today.  He was seen by Dr. Maldonado this morning and he feels that the patient does not need any further interventions at this time for his gallbladder.    Hospital Course:    Mr. Pena is a 71-year-old gentleman with chronic comorbidities including prior CABG, diastolic congestive heart failure, CAD, severe COPD with chronic hypoxic/hypercapnic respiratory failure, chronic kidney disease stage III, who presented from assisted living facility due to abdominal pain and shortness of breath with cough.  Chest x-ray showed atelectasis.  Patient was given IV antibiotics therapy, IV Lasix and morphine in the emergency room.  CT scan abdomen and pelvis without contrast with findings concerning for acute cholecystitis.  Patient was also noted to have worsening creatinine level from his baseline.    Patient was admitted to the medical floor and was placed on IV antibiotics therapy with Zosyn.  Dr. Donovan from nephrology was consulted and he recommended IV diuretic therapy with Bumex.  He was seen by Dr. Mcdonald from surgical services who did not feel that the patient had clinical evidence of cholecystitis and recommended HIDA  scan.  He also felt that the patient would be high surgical risk candidate for any open gallbladder surgery.    Both of the patient's blood cultures done in the emergency room came back positive for MRSA and he was placed on vancomycin.  Patient had a HIDA scan and it showed normal filling of the gallbladder not suggestive of cholecystitis.  Patient was seen by Dr. Maldonado and he recommended no further interventions for his gallbladder at this time.  Patient was noted to have nonsustained ventricular tachycardia on telemetry and Dr. Allen from cardiology was consulted.    Review of Systems:     All systems were reviewed and negative except as mentioned in subjective, assessment and plan.    Vital Signs:    Temp:  [97.5 °F (36.4 °C)-98 °F (36.7 °C)] 97.9 °F (36.6 °C)  Heart Rate:  [73-87] 87  Resp:  [17-20] 18  BP: (117-162)/(53-75) 117/59    Intake and output:    I/O last 3 completed shifts:  In: 1575 [P.O.:1325; IV Piggyback:250]  Out: 2080 [Urine:2080]  I/O this shift:  In: 600 [P.O.:600]  Out: 300 [Urine:300]    Physical Examination:    General Appearance:  Alert and cooperative.   Head:  Atraumatic and normocephalic.   Eyes: Conjunctivae and sclerae normal, no icterus. No pallor.   Throat: No oral lesions, no thrush, oral mucosa moist.   Neck: Supple, trachea midline, no thyromegaly.   Lungs:   Breath sounds heard bilaterally equally.  No crackles or wheezing. No Pleural rub or bronchial breathing.   Heart:  Normal S1 and S2, no murmur, no gallop, no rub. No JVD.  Pacemaker is palpated on the right upper chest.   Abdomen:   Normal bowel sounds, no masses, no organomegaly. Soft, tenderness noted in both the right upper quadrant region as well as suprapubic area, nondistended, no rebound tenderness.   Extremities: Supple, no edema, no cyanosis, no clubbing.   Skin: No bleeding or rash.   Neurologic: Alert and oriented x 3. No facial asymmetry. Moves all four limbs but does have generalized weakness. No tremors.       Laboratory results:    Results from last 7 days   Lab Units 08/01/21  0558 07/31/21  0656 07/30/21  0510 07/29/21  0437 07/28/21  1502   SODIUM mmol/L 139 137 135*   < > 136   POTASSIUM mmol/L 4.1 4.1 4.3   < > 4.8   CHLORIDE mmol/L 90* 88* 88*   < > 88*   CO2 mmol/L 35.2* 31.3* 33.7*   < > 38.2*   BUN mg/dL 99* 101* 104*   < > 88*   CREATININE mg/dL 3.49* 3.38* 3.28*   < > 2.84*   CALCIUM mg/dL 8.9 8.2* 8.5*   < > 9.1   BILIRUBIN mg/dL  --  0.9 1.0  --  1.1   ALK PHOS U/L  --  106 105  --  82   ALT (SGPT) U/L  --  9 10  --  13   AST (SGOT) U/L  --  25 27  --  27   GLUCOSE mg/dL 113* 84 87   < > 131*    < > = values in this interval not displayed.     Results from last 7 days   Lab Units 07/31/21  0656 07/30/21  0510 07/29/21  0437 07/28/21  1502 07/28/21  1502   WBC 10*3/mm3 17.71* 19.90* 22.50*   < > 19.56*   HEMOGLOBIN g/dL 7.9* 7.7* 7.6*   < > 8.8*   HEMATOCRIT % 25.9* 25.2* 25.5*   < > 28.6*   PLATELETS 10*3/mm3  --  167 154  --  151    < > = values in this interval not displayed.         Results from last 7 days   Lab Units 07/31/21  0205 07/28/21  1502   TROPONIN T ng/mL 0.065* 0.077*     Results from last 7 days   Lab Units 07/28/21  1854 07/26/21  1019   BLOODCX  Staphylococcus aureus, MRSA*  Staphylococcus aureus, MRSA*  --    URINECX   --  50,000 CFU/mL Mixed [place] Isolated     Results from last 7 days   Lab Units 07/28/21  1511   PH, ARTERIAL pH units 7.447   PO2 ART mm Hg 178.0*   PCO2, ARTERIAL mm Hg 59.0*   HCO3 ART mmol/L 40.6*     I have reviewed the patient's laboratory results.    Radiology results:    No radiology results from the last 24 hrs    Medication Review:     I have reviewed the patient's active and prn medications.     Problem List:      Pneumonia of both lower lobes due to methicillin resistant Staphylococcus aureus (MRSA) (CMS/HCC)    Acute respiratory failure with hypercapnia (CMS/HCC)    Calculus of gallbladder without cholecystitis without obstruction    MRSA  bacteremia    Assessment:    1. Acute on chronic respiratory failure with hypoxia and hypercapnia, POA.  2. Sepsis with MRSA bacteremia likely secondary to #3, POA.  3. Bilateral bacterial pneumonia, likely MRSA, POA.  4. Suspected cholecystitis, POA.  5. Acute renal failure on chronic kidney disease stage III, POA.  6. Acute on chronic systolic heart failure with ejection fraction of 25%  7. Coronary artery disease status post CABG.  8. Ischemic cardiomyopathy.  9. Sick sinus syndrome status post pacemaker.  10. Anemia of chronic kidney disease.    Plan:    Sepsis with MRSA bacteremia/bilateral pneumonia  -Continue Zosyn and vancomycin (day 4)  -Continue lactobacillus supplements.  -Blood cultures and nasal swab positive for MRSA.  -Bactroban ointment for nostrils.  -Repeat surveillance blood cultures were ordered for 8/2/2021.    Suspected cholecystitis  -Seen by Dr. Maldonado from surgical services and have discussed the patient's condition with him.  -Continue antibiotics therapy with Zosyn.  -HIDA scan done on 7/30/2021 showed normal filling without any cystic duct obstruction.  -No surgical interventions needed at this time.  -Is tolerating clear liquid diet and I will advance him to soft diet.    Acute renal failure on chronic kidney disease  -Followed by nephrology services.  -Patient has good urine output and may be slowly improving with regards to his renal function.  -Continue IV Bumex as per nephrology at this time.  -Hopefully, we can avoid hemodialysis at this time.  -We will continue to monitor renal function daily.  -We will continue Tinajero catheter for accurate urine output measurement.    Ischemic cardiomyopathy/nonsustained ventricular tachycardia.  -Likely secondary to his ischemic cardiomyopathy and low ejection fraction.  -Patient does not have AICD and only has pacemaker at this time.  -Electrolytes are fairly stable, in fact he has elevated magnesium level.  -Since his blood pressure has been  stable, I will start him on low-dose metoprolol therapy.  -I have discussed the patient's condition with Dr. Allen and he will be consulted.  -Patient's primary cardiologist is Dr. Bass.    Discussed with the patient's son Joao who is at the bedside. Discussed with nursing staff.  We will start him on physical and occupational therapy.    DVT Prophylaxis: Heparin  Code Status: DNR  Diet: N.p.o. except for ice chips.  Discharge Plan: Pending-will need rehab placement at discharge.    Ferdinand Greer MD  08/01/21  13:21 EDT    Dictated utilizing Dragon dictation.

## 2021-08-01 NOTE — PLAN OF CARE
Goal Outcome Evaluation:  Plan of Care Reviewed With: patient, family   Vitals unremarkable. Continues on oxygen at 5 liters pnc.  Heparin drip started.

## 2021-08-01 NOTE — PROGRESS NOTES
" Pharmacokinetic Follow-up Note - Vancomycin     Jani Pean is a 71 y.o. male  172.7 cm (68\") 75.1 kg (165 lb 8 oz)      Indication for use:   Sepsis secondary to PNA & MRSA bacteremia              Results from last 7 days   Lab Units 08/01/21  0558 07/31/21  0656 07/30/21  0510 07/29/21  0437 07/29/21  0437   WBC 10*3/mm3  --  17.71* 19.90*  --  22.50*   CREATININE mg/dL 3.49* 3.38* 3.28*   < > 3.03*    < > = values in this interval not displayed.      Estimated Creatinine Clearance: 20.6 mL/min (A) (by C-G formula based on SCr of 3.49 mg/dL (H)).      Temp Readings from Last 1 Encounters:   08/01/21 97.9 °F (36.6 °C) (Axillary)            Lab Results   Component Value Date     VANCOTROUGH 21.50 (H) 07/29/2021     VANCORANDOM 21.90 08/01/2021         Microbiology:  Microbiology Results (last 10 days)      Procedure Component Value - Date/Time     MRSA Screen, PCR (Inpatient) - Swab, Nares [[id number]]  (Abnormal) Collected: 07/29/21 0207     Lab Status: Final result Specimen: Swab from Nares Updated: 07/29/21 1348       MRSA PCR MRSA Detected     Respiratory Culture - Sputum, Cough [[id number]] Collected: 07/28/21 1953     Lab Status: Final result Specimen: Sputum from Cough Updated: 07/28/21 2035       Respiratory Culture Rejected     Narrative:       Specimen rejected due to microscopic exam of gram stain.     Respiratory Panel, PCR (WITHOUT COVID) - Swab, Nasopharynx [[id number]]  (Normal) Collected: 07/28/21 1953     Lab Status: Final result Specimen: Swab from Nasopharynx Updated: 07/28/21 2210       ADENOVIRUS, PCR Not Detected       Coronavirus 229E Not Detected       Coronavirus HKU1 Not Detected       Coronavirus NL63 Not Detected       Coronavirus OC43 Not Detected       Human Metapneumovirus Not Detected       Human Rhinovirus/Enterovirus Not Detected       Influenza B PCR Not Detected       Parainfluenza Virus 1 Not Detected       Parainfluenza Virus 2 Not Detected       Parainfluenza Virus 3 Not " Detected       Parainfluenza Virus 4 Not Detected       Bordetella pertussis pcr Not Detected       Influenza A H1 2009 PCR Not Detected       Chlamydophila pneumoniae PCR Not Detected       Mycoplasma pneumo by PCR Not Detected       Influenza A PCR Not Detected       Influenza A H3 Not Detected       Influenza A H1 Not Detected       RSV, PCR Not Detected       Bordetella parapertussis PCR Not Detected     Narrative:       The coronavirus on the RVP is NOT COVID-19 and is NOT indicative of infection with COVID-19.     In the setting of a positive respiratory panel with a viral infection PLUS a negative procalcitonin without other underlying concern for bacterial infection, consider observing off antibiotics or discontinuation of antibiotics and continue supportive care. If the respiratory panel is positive for atypical bacterial infection (Bordetella pertussis, Chlamydophila pneumoniae, or Mycoplasma pneumoniae), consider antibiotic de-escalation to target atypical bacterial infection.     Blood Culture - Blood, Arm, Left [[id number]]  (Abnormal) Collected: 07/28/21 1854     Lab Status: Final result Specimen: Blood from Arm, Left Updated: 07/31/21 0928       Blood Culture Staphylococcus aureus, MRSA       Comment: Infectious disease consultation is highly recommended to rule out distant foci of infection.  Methicillin resistant Staphylococcus aureus, Patient may be an isolation risk.          Isolated from Pediatric Bottle       Gram Stain Pediatric Bottle Gram positive cocci in clusters     Narrative:       Refer to previous blood culture collected on 7/28 for KEVIN's        Blood Culture - Blood, Arm, Left [[id number]]  (Abnormal)  (Susceptibility) Collected: 07/28/21 1854     Lab Status: Final result Specimen: Blood from Arm, Left Updated: 07/31/21 0927       Blood Culture Staphylococcus aureus, MRSA       Comment: Infectious disease consultation is highly recommended to rule out distant foci of  infection.  Methicillin resistant Staphylococcus aureus, Patient may be an isolation risk.          Isolated from Pediatric Bottle       Gram Stain Pediatric Bottle Gram positive cocci in clusters              Susceptibility                 Staphylococcus aureus, MRSA          KEVIN          Gentamicin Susceptible          Oxacillin Resistant          Rifampin Susceptible          Vancomycin Susceptible                                     Linear View                        Blood Culture ID, PCR - Blood, Arm, Left [[id number]] Collected: 07/28/21 1854     Lab Status: Final result Specimen: Blood from Arm, Left Updated: 07/29/21 0642     Narrative:       Staphylococcus aureus MRSA Detected.      COVID-19,[employer] IN-HOUSE,Nasal Swab No Transport Media 3-4 HR TAT - Swab, Nasal Cavity [[id number]]  (Normal) Collected: 07/28/21 1845     Lab Status: Final result Specimen: Swab from Nasal Cavity Updated: 07/28/21 1921       COVID19 Not Detected     Narrative:       Fact sheet for providers: https://www.fda.gov/media/388239/download      Fact sheet for patients: https://www.fda.gov/media/822359/download     Test performed by PCR.     Consider negative results in combination with clinical observations, patient history, and epidemiological information.     Urine Culture - Urine, Urine, Clean Catch [[id number]] Collected: 07/26/21 1019     Lab Status: Final result Specimen: Urine, Clean Catch Updated: 07/27/21 1215       Urine Culture 50,000 CFU/mL Mixed Tita Isolated     Narrative:       Specimen contains mixed organisms of questionable pathogenicity which indicates contamination with commensal tita.  Further identification is unlikely to provide clinically useful information.  Suggest recollection.             Current Vancomycin Dose:  Vancomycin dosing per levels     Other Antimicrobials:   Day 4 of 5 Zosyn 3.375g q8h     Assessment/Plan:  Mr. Pena continues on day 4 of 6 of vancomycin intermittent dosing. A 20 hour  post-infusion random level was reported as 21.9 mg/L. Based on this level and patient's worsening renal function, will hold vancomycin dose today and reassess in the morning with another random level.       Hermelinda Joseph PharmD   08/01/21 07:26 EDT

## 2021-08-01 NOTE — PROGRESS NOTES
[facility] Progress Note      Patient Name: Jani Pena  : 1950  MRN: [id number]  Primary Care Physician:  Miguel Rojas MD  Date of admission: 2021    Subjective     Interval History:   Follow-up acute kidney injury on chronic kidney disease  The patient is sleep difficult to arouse but when he woke up he denied having chest pain or shortness of air, denies nausea or vomiting, denies abdominal pain, he has Tinajero catheter anchored.    Review of Systems:   As noted above    Objective     Vitals:   Temp:  [97.5 °F (36.4 °C)-98 °F (36.7 °C)] 97.9 °F (36.6 °C)  Heart Rate:  [73-89] 89  Resp:  [17-20] 18  BP: (117-162)/(53-75) 117/59  Flow (L/min):  [5-6] 5    Intake/Output Summary (Last 24 hours) at 2021 1444  Last data filed at 2021 1300  Gross per 24 hour   Intake 1300 ml   Output 1580 ml   Net -280 ml       Physical Exam:    General Appearance: Appears to be chronically ill, obese, lethargic, arousable but falls asleep, no acute distress   Skin: warm and dry  HEENT: oral mucosa dry , nonicteric sclera  Neck: supple, no JVD  Lungs: Bilateral rhonchi, unlabored breathing effort  Heart: Irregularly irregular, no rub  Abdomen: soft, nontender, nondistended, normoactive bowel  : no palpable bladder, Tinajero catheter anchored in place  Extremities: no edema, cyanosis or clubbing  Neuro: Moving all extremities    Scheduled Meds:     aspirin, 81 mg, Oral, Daily  atorvastatin, 10 mg, Oral, Q PM  bisacodyl, 10 mg, Rectal, Daily  carboxymethylcellulose, 1 drop, Both Eyes, TID  clopidogrel, 75 mg, Oral, Daily  escitalopram, 10 mg, Oral, Daily  finasteride, 5 mg, Oral, Daily  heparin (porcine), 5,000 Units, Subcutaneous, Q8H  ipratropium-albuterol, 3 mL, Nebulization, Q6H - RT  lactobacillus acidophilus, 1 capsule, Oral, BID  metoprolol tartrate, 25 mg, Oral, Q12H  mupirocin, , Topical, Q12H  pantoprazole, 40 mg, Oral, Daily  piperacillin-tazobactam, 3.375 g, Intravenous,  Q8H  polyethylene glycol, 17 g, Oral, Daily  senna-docusate sodium, 1 tablet, Oral, BID  sodium chloride, 10 mL, Intravenous, Q12H  tamsulosin, 0.4 mg, Oral, Nightly  vancomycin (dosing per levels), , Does not apply, Daily      IV Meds:   Pharmacy to dose vancomycin,         Results Reviewed:   I have personally reviewed the results from the time of this admission to 8/1/2021 14:44 EDT     Results from last 7 days   Lab Units 08/01/21  0558 07/31/21  0656 07/30/21  0510 07/29/21  0437 07/28/21  1502   SODIUM mmol/L 139 137 135*   < > 136   POTASSIUM mmol/L 4.1 4.1 4.3   < > 4.8   CHLORIDE mmol/L 90* 88* 88*   < > 88*   CO2 mmol/L 35.2* 31.3* 33.7*   < > 38.2*   BUN mg/dL 99* 101* 104*   < > 88*   CREATININE mg/dL 3.49* 3.38* 3.28*   < > 2.84*   CALCIUM mg/dL 8.9 8.2* 8.5*   < > 9.1   BILIRUBIN mg/dL  --  0.9 1.0  --  1.1   ALK PHOS U/L  --  106 105  --  82   ALT (SGPT) U/L  --  9 10  --  13   AST (SGOT) U/L  --  25 27  --  27   GLUCOSE mg/dL 113* 84 87   < > 131*    < > = values in this interval not displayed.       Estimated Creatinine Clearance: 20.6 mL/min (A) (by C-G formula based on SCr of 3.49 mg/dL (H)).    Results from last 7 days   Lab Units 08/01/21  0558 07/31/21  0656   MAGNESIUM mg/dL 3.0*  --    PHOSPHORUS mg/dL 6.2* 5.8*       Results from last 7 days   Lab Units 08/01/21  0558   URIC ACID mg/dL 11.4*       Results from last 7 days   Lab Units 07/31/21  0656 07/30/21  0510 07/29/21  0437 07/28/21  1502 07/26/21  0547   WBC 10*3/mm3 17.71* 19.90* 22.50* 19.56* 12.00*   HEMOGLOBIN g/dL 7.9* 7.7* 7.6* 8.8* 10.3*   PLATELETS 10*3/mm3  --  167 154 151 210             Assessment / Plan     ASSESSMENT:  1.  Acute kidney injury associated with hemodynamic changes could be related to cardiorenal syndrome, his creatinine has been stable for the past 24 hours and his volume status appears to be reasonable, creatinine 3.49, BUN is down to 99, electrolytes within acceptable, except total CO2 35.2, uric acid  11.4  2.  Coronary artery disease with prior CABG  3.  Cardiomyopathy ejection fraction 25 to 30% with AICD in situ  4.  Anemia, with mild iron deficiency hemoglobin yesterday was seven-point  5.  Acute respiratory failure appears to be  6.  Gram-positive sepsis  7.  Hyperuricemia associated with acute kidney injury decreased effective intravascular volume  8.  Metabolic alkalosis probably associated with some degree of volume contraction    PLAN:  1.  Will hydrate cautiously  2.  No diuretics today  3.  Surveillance labs    I discussed the case with the patient    Thank you for involving us in the care of Jani Pena.  Please feel free to call with any questions.    Jerry Escalona MD  08/01/21  14:44 EDT    [facility]  [phone]      Much of this encounter note is an electronic transcription/translation of spoken language to printed text. The electronic translation of spoken language may permit erroneous, or at times, nonsensical words or phrases to be inadvertently transcribed; Although I have reviewed the note for such errors, some may still exist.

## 2021-08-01 NOTE — PROGRESS NOTES
LOS: 4 days   Patient Care Team:  Miguel Rojas MD as PCP - General (Internal Medicine)  Ulysses Bass MD as Cardiologist (Cardiology)      Chief Complaint: Patient states he feels better he is tolerating a liquid diet      Interval History:     Patient Complaints: See Above, passing flatus    History taken from: patient family RN    Vital Signs  Temp:  [97.5 °F (36.4 °C)-98.1 °F (36.7 °C)] 98 °F (36.7 °C)  Heart Rate:  [68-77] 77  Resp:  [17-20] 20  BP: (126-162)/(53-75) 126/58    Physical Exam:     General Appearance:    Alert, cooperative, in no acute distress   Head:    Normocephalic, without obvious abnormality, atraumatic   Lungs:     Clear to auscultation,respirations regular, even and                  unlabored    Heart:    Regular rhythm and normal rate, normal S1 and S2, no            murmur, no gallop, no rub, no click   Abdomen:     Normal bowel sounds, no masses, no organomegaly, soft        non-tender, non-distended, no guarding, no rebound                tenderness, much less tender today than he was yesterday   Extremities:   Moves all extremities well, no edema, no cyanosis, no             redness   Pulses:   Pulses palpable and equal bilaterally   Skin:   No bleeding, bruising or rash        Results Review:       Lab Results (last 24 hours)     Procedure Component Value Units Date/Time    Renal Function Panel [[id number]]  (Abnormal) Collected: 08/01/21 0558    Specimen: Blood Updated: 08/01/21 0645     Glucose 113 mg/dL      BUN 99 mg/dL      Creatinine 3.49 mg/dL      Sodium 139 mmol/L      Potassium 4.1 mmol/L      Chloride 90 mmol/L      CO2 35.2 mmol/L      Calcium 8.9 mg/dL      Albumin 3.00 g/dL      Phosphorus 6.2 mg/dL      Anion Gap 13.8 mmol/L      BUN/Creatinine Ratio 28.4     eGFR Non African Amer 17 mL/min/1.73     Narrative:      GFR Normal >60  Chronic Kidney Disease <60  Kidney Failure <15      Uric Acid [[id number]]  (Abnormal) Collected: 08/01/21 0558    Specimen: Blood  Updated: 08/01/21 0645     Uric Acid 11.4 mg/dL     Magnesium [[id number]]  (Abnormal) Collected: 08/01/21 0558    Specimen: Blood Updated: 08/01/21 0645     Magnesium 3.0 mg/dL     Vancomycin, Random [[id number]]  (Normal) Collected: 08/01/21 0558    Specimen: Blood Updated: 08/01/21 0642     Vancomycin Random 21.90 mcg/mL     Narrative:      Therapeutic Ranges for Vancomycin    Vancomycin Random   5.0-40.0 mcg/mL  Vancomycin Trough   5.0-20.0 mcg/mL  Vancomycin Peak     20.0-40.0 mcg/mL              Assessment/Plan       Pneumonia of both lower lobes due to methicillin resistant Staphylococcus aureus (MRSA) (CMS/HCC)    Acute respiratory failure with hypercapnia (CMS/HCC)    Calculus of gallbladder without cholecystitis without obstruction    MRSA bacteremia      Stable elderly white male has multiple medical problems.  He did have MRSA septicemia and bacteremia, there is a question of MRSA pneumonia.  As documented in my note yesterday I have reviewed in detail with the radiologist his x-ray studies including CT scan, ultrasound, and HIDA scan and according to the radiologist there is no signs on HIDA scan of obstructive pattern and there is filling of the gallbladder.    I do not think the patient needs any surgical intervention at this time for his gallbladder, clinically he looks improved, certainly he has multiple medical problems that would put him at very high risk for any surgical intervention.    I do not think the patient needs any further surgical care at this time from a gallbladder standpoint and we will be available to see him if needed during this hospitalization, please do not hesitate to contact us.      Celio Maldonado MD  08/01/21  09:10 EDT

## 2021-08-01 NOTE — CONSULTS
"[facility]-Cardiology Consult Note    Referring Provider: Percy  Reason for Consultation: NSVT    Patient Care Team:  Miguel Rojas MD as PCP - General (Internal Medicine)  Ulysses Bass MD as Cardiologist (Cardiology)    Chief complaint : Shortness of breath    Subjective:    History of present illness: This is a 71-year-old male patient who was admitted 3 days ago for worsening shortness of breath, cough, orthopnea and palpitations.  The patient describes a sense that his heart is racing with an uncomfortable awareness that his heart is \"pounding\".  He also reports worsening shortness of breath both at rest and with activity.  The patient has a history of advanced emphysema and is on continuous chronic oxygen therapy.  The patient has a history of nonischemic dilated cardiomyopathy.  He has had 2 prior heart catheterizations demonstrating no obstructive coronary artery disease and multiple negative stress test.  His last echocardiogram 1 year ago demonstrated an ejection fraction of 25% with moderate-severe left ventricular enlargement, severe left atrial enlargement and moderate severity mitral regurgitation as well as moderate severity aortic regurgitation.  The patient has a history of paroxysmal atrial fibrillation.  He is not on anticoagulation for an unknown reason despite an extremely high embolic risk score.  The patient has a history of sick sinus syndrome and has a dual-chamber rate responsive pacemaker implanted.  The patient's BNP was elevated.  Chest x-ray showed a small left pleural effusion.  There was a left lower lobe infiltrate thought to be due to atelectasis.  There did not appear to be evidence of interstitial or alveolar pulmonary edema or pulmonary venous congestion.  The patient's cardiac troponins have been minimally elevated in a \"plateau-pattern\".  The patient's cardiac troponins have been persistently elevated and the 0.03 ng/dL to 0.08 ng/dL range dating back to 2018.  Review of the " patient's telemetry demonstrates episodes of AV sequential pacing as well as atrial fibrillation with ventricular pacing.  The patient has had occasional monomorphic PVCs.  Last night the patient had to episodes of brief salvos nonsustained monomorphic ventricular tachycardia lasting approximately 10-12 beats per episode.  Both were asymptomatic.  The patient has a history of hypertension and dyslipidemia.  He has a history of a prior PFO that was closed percutaneously by Dr. Alves at the [facility].  He is followed as an outpatient at the [facility] location by Dr. Bass    Review of Systems   Review of Systems   Constitutional: Positive for malaise/fatigue. Negative for chills, diaphoresis, fever, weight gain and weight loss.   HENT: Negative for ear discharge, hearing loss, hoarse voice and nosebleeds.    Eyes: Negative for discharge, double vision, pain and photophobia.   Cardiovascular: Positive for dyspnea on exertion, irregular heartbeat, leg swelling, orthopnea and palpitations. Negative for chest pain, claudication, cyanosis, near-syncope, paroxysmal nocturnal dyspnea and syncope.   Respiratory: Positive for shortness of breath. Negative for cough, hemoptysis, sputum production and wheezing.    Endocrine: Negative for cold intolerance, heat intolerance, polydipsia, polyphagia and polyuria.   Hematologic/Lymphatic: Negative for adenopathy and bleeding problem. Does not bruise/bleed easily.   Skin: Negative for color change, flushing, itching and rash.   Musculoskeletal: Negative for muscle cramps, muscle weakness, myalgias and stiffness.   Gastrointestinal: Negative for abdominal pain, diarrhea, hematemesis, hematochezia, nausea and vomiting.   Genitourinary: Negative for dysuria, frequency and nocturia.   Neurological: Negative for focal weakness, loss of balance, numbness, paresthesias and seizures.   Psychiatric/Behavioral: Negative for altered mental status,  hallucinations and suicidal ideas.   Allergic/Immunologic: Negative for HIV exposure, hives and persistent infections.       History  Past Medical History:   Diagnosis Date   • Abnormal liver enzymes    • Anemia    • Anxiety    • Arthritis    • Atherosclerotic heart disease of native coronary artery without angina pectoris    • Atrial fibrillation (CMS/HCC)    • Back pain    • BPH (benign prostatic hyperplasia)    • Cataract, bilateral    • Chest pain     2-3 MONTHS AGO.  PER PT, HAS ADDRESSED WITH CARDIOLOGIST.  STATES HAS NTG PRN.   • CHF (congestive heart failure) (CMS/HCC)    • Chronic bronchitis (CMS/HCC)    • Chronic kidney disease    • COPD (chronic obstructive pulmonary disease) (CMS/HCC)    • Degeneration of cervical intervertebral disc    • Depression    • Diverticulosis    • Dyspnea    • Esophageal reflux    • Esophagitis    • Gastritis    • Hematuria    • Hemorrhoids    • Hiatal hernia    • History of arthritis    • History of nuclear stress test     UNSURE OF DATE   • History of peripheral neuropathy    • History of ventricular septal defect    • History of ventricular septal defect    • Hyperlipidemia    • Hypertension    • Impaired functional mobility, balance, gait, and endurance    • Infectious diarrhea    • Kyphosis, acquired    • Lumbar radiculopathy    • Mitral and aortic valve disease    • Nephrolithiasis    • Phimosis    • Problems with swallowing     WITH FOOD   • Respiratory failure (CMS/HCC)    • Sleep apnea     BIPAP, too awkward to bring   • TIA (transient ischemic attack)     X3.   2014   • Ventral hernia    • Wears glasses     FOR READING   ,   Past Surgical History:   Procedure Laterality Date   • CARDIAC CATHETERIZATION     • CARDIAC CATHETERIZATION N/A 5/24/2018    Procedure: Left Heart Cath;  Surgeon: Edouard Robertson MD;  Location: [facility] CATH INVASIVE LOCATION;  Service: Cardiovascular   • CARDIAC ELECTROPHYSIOLOGY PROCEDURE N/A 1/31/2019    Procedure: PACEMAKER IMPLANTATION- DC;   Surgeon: Omar Encinas DO;  Location:  [facility] EP INVASIVE LOCATION;  Service: Cardiology   • COLONOSCOPY N/A 2019    Procedure: COLONOSCOPY WITH HOT POLYPECTOMY;  Surgeon: Celio Maldonado MD;  Location:  J[facility]IOR ENDOSCOPY;  Service: Gastroenterology   • ENDOSCOPY N/A 2019    Procedure: ESOPHAGOGASTRODUODENOSCOPY;  Surgeon: Brunner, Mark I, MD;  Location:  [facility];  Service: Gastroenterology   • ENDOSCOPY N/A 2019    Procedure: ESOPHAGOGASTRODUODENOSCOPY;  Surgeon: Valdemar Mcdonald MD;  Location:  [facility] ENDOSCOPY;  Service: General   • ENDOSCOPY N/A 10/20/2020    Procedure: ESOPHAGOGASTRODUODENOSCOPY WITH DILATION AND COLD FORCEP BIOPSY;  Surgeon: Cale Madrid MD;  Location:  [facility] ENDOSCOPY;  Service: Gastroenterology;  Laterality: N/A;   • ESOPHAGEAL MOTILITY STUDY N/A 2021    Procedure: MANOMETRY;  Surgeon: Uriel Tinsley MD;  Location:  [facility];  Service: Gastroenterology;  Laterality: N/A;   • HERNIA REPAIR     • ORTHOPEDIC SURGERY Left     Left hip arthroplasty   • PACEMAKER IMPLANTATION  2019   • SUPRAPUBIC CATHETER INSERTION      History of Percutaneous Catheter Placement Into Ureter   • THROAT SURGERY      FOR SLEEP APNEA   • VSD REPAIR      History of VSD Repair By Patch Closure   ,   Family History   Problem Relation Age of Onset   • Other Father         CABG   • Coronary artery disease Father    • Colon cancer Neg Hx    ,   Social History     Tobacco Use   • Smoking status: Former Smoker     Packs/day: 3.00     Years: 45.00     Pack years: 135.00     Types: Cigarettes     Quit date:      Years since quittin.5   • Smokeless tobacco: Never Used   Vaping Use   • Vaping Use: Never used   Substance Use Topics   • Alcohol use: No   • Drug use: No   ,   Medications Prior to Admission   Medication Sig Dispense Refill Last Dose   • Acetaminophen Extra Strength 500 MG tablet TAKE 1 TABLET BY MOUTH EVERY 6 HOURS AS NEEDED FOR MILD PAIN 180 tablet 3  2021 at Unknown time   • alfuzosin (UROXATRAL) 10 MG 24 hr tablet @@ TAKE ONE TABLET BY MOUTH DAILY 90 tablet 3 2021 at Unknown time   • ALPRAZolam (XANAX) 0.25 MG tablet Take 1 tablet by mouth 2 (Two) Times a Day As Needed (anxiety). 30 tablet 0    • Aspirin Low Dose 81 MG chewable tablet @@ TAKE ONE TABLET BY MOUTH DAILY 90 tablet 3    • atorvastatin (LIPITOR) 10 MG tablet TAKE 1 TABLET BY MOUTH EVERY EVENING 90 tablet 3    • benzonatate (TESSALON) 200 MG capsule Take 1 capsule by mouth 3 (Three) Times a Day As Needed for Cough. 45 capsule 5    • carboxymethylcellulose (REFRESH PLUS) 0.5 % solution Administer 1 drop to both eyes 3 (Three) Times a Day As Needed for Dry Eyes.      • [] cefdinir (OMNICEF) 300 MG capsule Take 1 capsule by mouth 2 (Two) Times a Day for 5 days. (Patient not taking: Reported on 2021) 10 capsule 0 Not Taking at Unknown time   • clopidogrel (PLAVIX) 75 MG tablet TAKE ONE TABLET BY MOUTH DAILY 90 tablet 3    • cyclobenzaprine (FLEXERIL) 5 MG tablet Take 5 mg by mouth 3 (Three) Times a Day As Needed for Muscle Spasms.      • dilTIAZem CD (CARDIZEM CD) 240 MG 24 hr capsule TAKE ONE CAPSULE BY MOUTH DAILY 30 capsule 11    • diphenhydrAMINE-zinc acetate 2-0.1 % cream Apply  topically to the appropriate area as directed 3 (Three) Times a Day As Needed for Itching.      • escitalopram (LEXAPRO) 20 MG tablet @@ TAKE ONE TABLET BY MOUTH DAILY 90 tablet 3    • famotidine (PEPCID) 40 MG tablet TAKE ONE TABLET BY MOUTH EVERY DAY 90 tablet 3    • ferrous sulfate 325 (65 FE) MG tablet Take 325 mg by mouth Daily With Breakfast.      • finasteride (PROSCAR) 5 MG tablet TAKE ONE TABLET BY MOUTH DAILY 90 tablet 3    • fluocinonide (LIDEX) 0.05 % cream Apply  topically to the appropriate area as directed 2 (Two) Times a Day.      • furosemide (LASIX) 40 MG tablet Take 1 tablet by mouth 2 (Two) Times a Day for 7 days. 14 tablet 0    • HYDROcodone-acetaminophen (NORCO) 5-325 MG per tablet  Take 1 tablet by mouth Every 8 (Eight) Hours As Needed for Severe Pain . 12 tablet 0    • hydrocortisone 1 % cream Apply 1 application topically to the appropriate area as directed 2 (Two) Times a Day.      • ipratropium (Atrovent) 0.06 % nasal spray 2 sprays into the nostril(s) as directed by provider 4 (Four) Times a Day. 15 mL 1    • ipratropium-albuterol (DUO-NEB) 0.5-2.5 mg/3 ml nebulizer Take 3 mL by nebulization Every 4 (Four) Hours As Needed for Wheezing. 360 mL 6    • lidocaine (LIDODERM) 5 % Place 1 patch on the skin as directed by provider Daily. Remove & Discard patch within 12 hours or as directed by MD 30 patch 3    • loperamide (IMODIUM) 2 MG capsule Take 2 mg by mouth 4 (Four) Times a Day As Needed for Diarrhea.      • magnesium citrate 1.745 GM/30ML solution solution Take 15 mL by mouth Daily As Needed (constipation).      • melatonin 5 MG tablet tablet @@ TAKE 1 TABLET BY MOUTH EVERY EVENING 90 tablet 3    • mirtazapine (REMERON) 15 MG tablet TAKE 1 TABLET BY MOUTH DAILY AT BEDTIME 90 tablet 3    • oxybutynin (DITROPAN) 5 MG tablet TAKE 1 TABLET BY MOUTH DAILY AT BEDTIME 90 tablet 3    • pantoprazole (PROTONIX) 40 MG EC tablet TAKE ONE TABLET BY MOUTH DAILY 90 tablet 3    • polyethylene glycol (MIRALAX) 17 g packet Take 17 g by mouth 2 (Two) Times a Day. 30 each 0    • potassium chloride 10 MEQ CR tablet TAKE ONE TABLET BY MOUTH DAILY 90 tablet 3    • predniSONE (DELTASONE) 10 MG tablet TAKE ONE TABLET BY MOUTH DAILY 90 tablet 3    • promethazine (PHENERGAN) 25 MG tablet TAKE 1 TABLET BY MOUTH EVERY 6 HOURS AS NEEDED FOR NAUSEA AND VOMITING 15 tablet 0    • sodium chloride 0.65 % nasal spray 1 spray into the nostril(s) as directed by provider As Needed for Congestion.      • Stimulant Laxative 8.6-50 MG per tablet @@TAKE TWO TABLETS BY MOUTH TWO TIMES A  tablet 3    • traZODone (DESYREL) 50 MG tablet TAKE 1 TABLET BY MOUTH EVERY EVENING 90 tablet 3    • Trelegy Ellipta 100-62.5-25 MCG/INH  "inhaler INHALE ONE PUFF BY MOUTH EVERY DAY 60 each 3     and Allergies:  Levaquin [levofloxacin] and Sulfa antibiotics    Objective:    Vital Sign Min/Max for last 24 hours  Temp  Min: 97.5 °F (36.4 °C)  Max: 98 °F (36.7 °C)   BP  Min: 117/59  Max: 162/65   Pulse  Min: 73  Max: 89   Resp  Min: 17  Max: 20   SpO2  Min: 85 %  Max: 96 %   Flow (L/min)  Min: 5  Max: 6   Weight  Min: 75.1 kg (165 lb 8 oz)  Max: 75.1 kg (165 lb 8 oz)     Flowsheet Rows      First Filed Value   Admission Height  172.7 cm (68\") Documented at 07/28/2021 1446   Admission Weight  78.8 kg (173 lb 12.8 oz) Documented at 07/28/2021 1450             Ejection Fraction  No results found for: EF    Echo EF Estimated  Lab Results   Component Value Date    ECHOEFEST 25 01/13/2021       Nuclear Stress Ejection Fraction  No components found for: NUCEF    Cath Ejection Fraction Quantitative  No results found for: CATHEF    Physical Exam:   Vitals and nursing note reviewed.   Constitutional:       Appearance: Frail. Chronically ill-appearing.   Eyes:      Conjunctiva/sclera: Conjunctivae normal.      Pupils: Pupils are equal, round, and reactive to light.   Neck:      Vascular: No JVR. JVD normal.   Pulmonary:      Effort: Pulmonary effort is normal.      Breath sounds: Normal breath sounds. No wheezing. No rhonchi. No rales.   Chest:      Chest wall: Not tender to palpatation.   Cardiovascular:      PMI at left midclavicular line. Normal rate. Regular rhythm. Normal S1. Normal S2.      Murmurs: There is a grade 2/6 high frequency blowing plateau holosystolic murmur at the apex. There is a grade 2/4 high frequency blowing decrescendo, early diastolic murmur at the URSB, radiating to the apex.      S3 Gallop. No click. No rub.   Pulses:     Intact distal pulses.   Edema:     Peripheral edema present.     Pretibial: bilateral 2+ pitting edema of the pretibial area.     Ankle: bilateral 2+ pitting edema of the ankle.     Feet: bilateral 2+ pitting edema of " the feet.  Abdominal:      General: Bowel sounds are normal.      Palpations: Abdomen is soft.      Tenderness: There is no abdominal tenderness.   Musculoskeletal: Normal range of motion.         General: No tenderness. Skin:     General: Skin is warm and dry.   Neurological:      General: No focal deficit present.      Mental Status: Alert and oriented to person, place and time.         Results Review:   I reviewed the patient's new clinical results.  Results from last 7 days   Lab Units 07/31/21  0656 07/30/21  0510 07/29/21  0437 07/28/21  1502 07/28/21  1502   WBC 10*3/mm3 17.71* 19.90* 22.50*   < > 19.56*   HEMOGLOBIN g/dL 7.9* 7.7* 7.6*   < > 8.8*   HEMATOCRIT % 25.9* 25.2* 25.5*   < > 28.6*   PLATELETS 10*3/mm3  --  167 154  --  151    < > = values in this interval not displayed.     Results from last 7 days   Lab Units 08/01/21  0558 07/31/21  0656 07/31/21  0656 07/30/21  0510 07/30/21  0510   SODIUM mmol/L 139  --  137  --  135*   POTASSIUM mmol/L 4.1  --  4.1  --  4.3   CHLORIDE mmol/L 90*  --  88*  --  88*   CO2 mmol/L 35.2*  --  31.3*  --  33.7*   BUN mg/dL 99*  --  101*  --  104*   CREATININE mg/dL 3.49*  --  3.38*  --  3.28*   GLUCOSE mg/dL 113*   < > 84   < > 87   CALCIUM mg/dL 8.9  --  8.2*  --  8.5*    < > = values in this interval not displayed.     Lab Results   Lab Value Date/Time    TROPONINT 0.065 (C) 07/31/2021 0205    TROPONINT 0.077 (C) 07/28/2021 1502    TROPONINT 0.022 07/21/2021 0545    TROPONINT 0.050 (C) 07/20/2021 1055    TROPONINT 0.037 (C) 03/15/2021 2239    TROPONINT 0.040 (C) 03/15/2021 1650    TROPONINT 0.065 (C) 03/15/2021 0833    TROPONINT 0.027 12/22/2020 1931    TROPONINT 0.033 (C) 12/22/2020 1506    TROPONINT 0.026 12/15/2020 0619    TROPONINT 0.040 (C) 12/13/2020 1948    TROPONINT 0.030 10/25/2020 1055    TROPONINT 0.029 09/25/2020 1717    TROPONINT 0.034 (C) 09/25/2020 1527    TROPONINT 0.029 12/26/2019 2129    TROPONINT 0.035 (C) 12/26/2019 1809    TROPONINT 0.017  12/14/2019 0612    TROPONINT 0.030 12/13/2019 2228    TROPONINT 0.034 (C) 12/13/2019 1838    TROPONINT 0.033 (C) 11/10/2019 0935    TROPONINT 0.032 (C) 08/30/2019 0012    TROPONINT 0.014 08/29/2019 1516    TROPONINT 0.025 08/29/2019 1130             Assessment/Plan:      Pneumonia of both lower lobes due to methicillin resistant Staphylococcus aureus (MRSA) (CMS/[facility])    Acute respiratory failure with hypercapnia (CMS/[facility])    Calculus of gallbladder without cholecystitis without obstruction    MRSA bacteremia    Nonsustained ventricular tachycardia.  Brief duration.  Asymptomatic.    Nonischemic dilated cardiomyopathy.    Acute renal failure.  Nonoliguric.  Worsening azotemia.    Acute on chronic left ventricular systolic heart failure.  Heart failure with reduced ejection fraction.  Stage D.  New York Heart Association functional class IV symptoms.  Mild volume overload.  Nonischemic etiology.  Left ventricular ejection fraction 25%    Acute on chronic renal failure.  Baseline serum creatinine 1.6 mg/dL.  In hospital worsening of azotemia to 3.5 mg/dL.    Paroxysmal atrial fibrillation.    Sick sinus syndrome.  Normally functioning dual-chamber rate responsive pacemaker.    Valvular heart disease.  Moderate severity aortic regurgitation and moderate severity mitral regurgitation.    I have recommended optimization of heart failure management.  I have recommended discontinuation of Lopressor and initiation of the most cardioselective beta-blocker shown to be beneficial in heart failure with reduced ejection fraction trials-bisoprolol.  I have recommended discontinuation of diltiazem.  This medication is relatively contraindicated in its oral form for heart failure with reduced ejection fraction.  I have recommended starting a combination of hydralazine/ISDN, as ACE inhibitor therapy, angiotensin II receptor blockade and Entresto are contraindicated with worsening renal failure.  Prior to discharge to home the patient  will require initiation of anticoagulation therapy with Eliquis 5 mg orally twice daily.  In the meantime, I would recommend full dose heparinization with unfractionated heparin drip.  Given the patient's severe emphysema and overall poor health, as well as his DNR status, he is probably not a candidate for placing a prophylactic defibrillator.  This will have to be addressed further by his primary cardiologist-Dr. Bass.  Ideally, until this discussion can be arranged, the patient should be discharged to home with a LifeVest.  Follow-up with  at the [facility] location.  Close observation for occult GI blood loss as the patient has quite severe anemia.    I discussed the patients findings and my recommendations with patient    Jose Allen MD  08/01/21  15:02 EDT

## 2021-08-01 NOTE — PLAN OF CARE
Goal Outcome Evaluation:  Plan of Care Reviewed With: patient, son        Progress: no change  Outcome Summary: Pt was restless first part of the night requesting pain meds for generalized body aches as well as head ache.  Once patient was rolled over to left side seem to rest much better early in the morning.VSS

## 2021-08-01 NOTE — THERAPY TREATMENT NOTE
Patient Name: Jani Pena  : 1950    MRN: [id number]                              Today's Date: 2021       Admit Date: 2021    Visit Dx:     ICD-10-CM ICD-9-CM   1. Acute respiratory failure with hypercapnia (CMS/[facility])  J96.02 518.81   2. HAP (hospital-acquired pneumonia)  J18.9 486    Y95    3. ARTIS (acute kidney injury) (CMS/[facility])  N17.9 584.9   4. Acute congestive heart failure, unspecified heart failure type (CMS/[facility])  I50.9 428.0     Patient Active Problem List   Diagnosis   • Anxiety   • Atrial fibrillation (CMS/[facility])   • Chronic kidney disease   • Steroid-dependent COPD (CMS/[facility])   • Degeneration of cervical intervertebral disc   • Gastroesophageal reflux disease   • Diverticulosis   • Hemorrhoids   • Hiatal hernia   • Hyperlipidemia   • Kyphosis, acquired   • PEDRO on CPAP   • Benign prostatic hyperplasia with incomplete bladder emptying   • Depression   • History of ventricular septal defect   • Chronic diastolic congestive heart failure (CMS/[facility])   • Coronary artery disease involving native coronary artery of native heart with angina pectoris (CMS/[facility])   • Lumbar radiculopathy   • Anemia   • Cardiac pacemaker in situ   • Sick sinus syndrome (CMS/[facility])   • Chronic respiratory failure with hypoxia and hypercapnia (CMS/[facility])   • Other dysphagia   • Presbyesophagus   • Vitamin D deficiency, unspecified    • Palpitation   • Bilateral leg edema   • Acute on chronic respiratory failure with hypoxia and hypercapnia (CMS/[facility])   • Acute respiratory failure with hypercapnia (CMS/[facility])   • Calculus of gallbladder without cholecystitis without obstruction   • Pneumonia of both lower lobes due to methicillin resistant Staphylococcus aureus (MRSA) (CMS/[facility])   • MRSA bacteremia     Past Medical History:   Diagnosis Date   • Abnormal liver enzymes    • Anemia    • Anxiety    • Arthritis    • Atherosclerotic heart disease of native coronary artery without angina pectoris    • Atrial fibrillation (CMS/[facility])    •  Back pain    • BPH (benign prostatic hyperplasia)    • Cataract, bilateral    • Chest pain     2-3 MONTHS AGO.  PER PT, HAS ADDRESSED WITH CARDIOLOGIST.  STATES HAS NTG PRN.   • CHF (congestive heart failure) (CMS/HCC)    • Chronic bronchitis (CMS/HCC)    • Chronic kidney disease    • COPD (chronic obstructive pulmonary disease) (CMS/HCC)    • Degeneration of cervical intervertebral disc    • Depression    • Diverticulosis    • Dyspnea    • Esophageal reflux    • Esophagitis    • Gastritis    • Hematuria    • Hemorrhoids    • Hiatal hernia    • History of arthritis    • History of nuclear stress test     UNSURE OF DATE   • History of peripheral neuropathy    • History of ventricular septal defect    • History of ventricular septal defect    • Hyperlipidemia    • Hypertension    • Impaired functional mobility, balance, gait, and endurance    • Infectious diarrhea    • Kyphosis, acquired    • Lumbar radiculopathy    • Mitral and aortic valve disease    • Nephrolithiasis    • Phimosis    • Problems with swallowing     WITH FOOD   • Respiratory failure (CMS/HCC)    • Sleep apnea     BIPAP, too awkward to bring   • TIA (transient ischemic attack)     X3.   2014   • Ventral hernia    • Wears glasses     FOR READING     Past Surgical History:   Procedure Laterality Date   • CARDIAC CATHETERIZATION     • CARDIAC CATHETERIZATION N/A 5/24/2018    Procedure: Left Heart Cath;  Surgeon: Edouard Robertson MD;  Location:  [facility];  Service: Cardiovascular   • CARDIAC ELECTROPHYSIOLOGY PROCEDURE N/A 1/31/2019    Procedure: PACEMAKER IMPLANTATION- DC;  Surgeon: Omar Encinas DO;  Location:  [facility];  Service: Cardiology   • COLONOSCOPY N/A 12/28/2019    Procedure: COLONOSCOPY WITH HOT POLYPECTOMY;  Surgeon: Celio Maldonado MD;  [facility]  [facility];  Service: Gastroenterology   • ENDOSCOPY N/A 1/26/2019    Procedure: ESOPHAGOGASTRODUODENOSCOPY;  Surgeon: Brunner, Mark I, MD;  Location:   [facility];  Service: Gastroenterology   • ENDOSCOPY N/A 12/27/2019    Procedure: ESOPHAGOGASTRODUODENOSCOPY;  Surgeon: Valdemar Mcdonald MD;  Location: [facility] ENDOSCOPY;  Service: General   • ENDOSCOPY N/A 10/20/2020    Procedure: ESOPHAGOGASTRODUODENOSCOPY WITH DILATION AND COLD FORCEP BIOPSY;  Surgeon: Cale Madrid MD;  Location: [facility] ENDOSCOPY;  Service: Gastroenterology;  Laterality: N/A;   • ESOPHAGEAL MOTILITY STUDY N/A 7/6/2021    Procedure: MANOMETRY;  Surgeon: Uriel Tinsley MD;  Location:  [facility];  Service: Gastroenterology;  Laterality: N/A;   • HERNIA REPAIR     • ORTHOPEDIC SURGERY Left     Left hip arthroplasty   • PACEMAKER IMPLANTATION  01/31/2019   • SUPRAPUBIC CATHETER INSERTION      History of Percutaneous Catheter Placement Into Ureter   • THROAT SURGERY      FOR SLEEP APNEA   • VSD REPAIR      History of VSD Repair By Patch Closure     General Information     Row Name 08/01/21 1320          Physical Therapy Time and Intention    Document Type  therapy note (daily note)  -     Mode of Treatment  physical therapy  -TW     Row Name 08/01/21 1320          General Information    Barriers to Rehab  medically complex;previous functional deficit;cognitive status  -TW     Row Name 08/01/21 1320          Cognition    Orientation Status (Cognition)  oriented to;person;situation  -     Row Name 08/01/21 1320          Safety Issues, Functional Mobility    Safety Issues Affecting Function (Mobility)  ability to follow commands;friction/shear risk;insight into deficits/self-awareness;safety precaution awareness;safety precautions follow-through/compliance;sequencing abilities  -TW     Impairments Affecting Function (Mobility)  balance;cognition;coordination;endurance/activity tolerance;pain;strength;motor planning;shortness of breath  -TW     Cognitive Impairments, Mobility Safety/Performance  insight into deficits/self-awareness;safety precaution awareness;safety  precaution follow-through;sequencing abilities  -TW     Comment, Safety Issues/Impairments (Mobility)  Pt has been up in chair since before lunch and expresses fatigue and demonstrates drowsiness.  -TW       User Key  (r) = Recorded By, (t) = Taken By, (c) = Cosigned By    Initials Name Provider Type    Jacqueline Ochoa PT Physical Therapist        Mobility     Row Name 08/01/21 1320          Bed Mobility    Bed Mobility  sit-supine;rolling right;scooting/bridging  -TW     Rolling Right [place] (Bed Mobility)  moderate assist (50% patient effort);verbal cues  -TW     Scooting/Bridging [place] (Bed Mobility)  2 person assist;maximum assist (25% patient effort);verbal cues  -TW     Sit-Supine [place] (Bed Mobility)  maximum assist (25% patient effort);verbal cues  -TW     Assistive Device (Bed Mobility)  head of bed elevated;draw sheet  -TW     Comment (Bed Mobility)  limited pushing ability through LEs to assist with bed mobility.  -     Row Name 08/01/21 1320          Transfers    Comment (Transfers)  Pt had just been transferred to EOB with nursing +1 max assist and was sitting on EOB when therapist entered room.  -     Row Name 08/01/21 1320          Bed-Chair Transfer    Bed-Chair [place] (Transfers)  not tested  -     Row Name 08/01/21 1320          Gait/Stairs (Locomotion)    [place] Level (Gait)  not tested  -TW     [place] Level (Stairs)  not tested  -       User Key  (r) = Recorded By, (t) = Taken By, (c) = Cosigned By    Initials Name Provider Type    Jacqueline Ochoa PT Physical Therapist        Obj/Interventions     Row Name 08/01/21 1320          Motor Skills    Therapeutic Exercise  hip;knee;ankle AAROM for BLE 2 x 5 including: hip abd, heelslides, ankle pumps. Pt very drowsy and inconsistent with participation. Pt did have c/o pain with R hip movement.  -     Row Name 08/01/21 1320          Balance    Balance Assessment  sitting static balance;sitting dynamic  balance  -TW     Static Sitting Balance  mild impairment;supported  -TW     Dynamic Sitting Balance  mild impairment;supported;sitting, edge of bed  -TW       User Key  (r) = Recorded By, (t) = Taken By, (c) = Cosigned By    Initials Name Provider Type    TW Jacqueline Horton, PT Physical Therapist        Goals/Plan    No documentation.       Clinical Impression     Row Name 08/01/21 1320          Pain    Additional Documentation  Pain Scale: FACES Pre/Post-Treatment (Group)  -TW     Row Name 08/01/21 1320          Pain Scale: FACES Pre/Post-Treatment    Pain: FACES Scale, Pretreatment  4-->hurts little more  -TW     Posttreatment Pain Rating  2-->hurts little bit  -TW     Pain Location - Side  Right  -TW     Pain Location - Orientation  lower  -TW     Pain Location  extremity  -TW     Pre/Posttreatment Pain Comment  Pain with R hip AAROM that decreased once movement ceased.  -TW     Row Name 08/01/21 1320          Plan of Care Review    Plan of Care Reviewed With  patient  -TW     Progress  no change  -TW     Outcome Summary  PT treatment completed bedside this pm with pt presenting on EOB with nursing just completing transfer to bed from chair. Pt agreed to PT treatment with  AAROM for BLE 2 x 5 performed including: hip abd, heelslides, ankle pumps with pt being very drowsy and inconsistent with participation. Pt did have c/o pain with R hip movement and B ankle tremors noted with heelcord stretching. Pt was then positioned on the R side rolling with mod assist to take pressure of Buttocks. Cont PT POC as pt is able to tolerate.  -TW     Row Name 08/01/21 1320          Vital Signs    Pre SpO2 (%)  92  -TW     O2 Delivery Pre Treatment  supplemental O2 5 L  -TW     Intra SpO2 (%)  93  -TW     O2 Delivery Intra Treatment  supplemental O2 5 L  -TW     Row Name 08/01/21 1320          Positioning and Restraints    Pre-Treatment Position  in bed  -TW     Post Treatment Position  bed  -TW     In Bed  side lying right;call  light within reach;encouraged to call for assist;exit alarm on;side rails up x3;notified nsg  -TW       User Key  (r) = Recorded By, (t) = Taken By, (c) = Cosigned By    Initials Name Provider Type    Jacqueline Ochoa PT Physical Therapist        Outcome Measures     Row Name 08/01/21 1320          How much help from another person do you currently need...    Turning from your back to your side while in flat bed without using bedrails?  2  -TW     Moving from lying on back to sitting on the side of a flat bed without bedrails?  2  -TW     Moving to and from a bed to a chair (including a wheelchair)?  2  -TW     Standing up from a chair using your arms (e.g., wheelchair, bedside chair)?  2  -TW     Climbing 3-5 steps with a railing?  1  -TW     To walk in hospital room?  1  -TW     AM-PAC 6 Clicks Score (PT)  10  -TW     Row Name 08/01/21 1320          Functional Assessment    Outcome Measure Options  AM-PAC 6 Clicks Basic Mobility (PT)  -TW       User Key  (r) = Recorded By, (t) = Taken By, (c) = Cosigned By    Initials Name Provider Type    Jacqueline Ochoa PT Physical Therapist                       Physical Therapy Education                 Title: PT OT SLP Therapies (In Progress)     Topic: Physical Therapy (In Progress)     Point: Mobility training (In Progress)     Learning Progress Summary           Patient Acceptance, E,D, NL by TW at 7/31/2021 1436    Comment: Pt education for improving transfer technique and safety. Pt very drowsy and unsure how much pt was able to understand.                   Point: Home exercise program (Done)     Learning Progress Summary           Patient Acceptance, E,D, DU,NR by TW at 8/1/2021 1320    Comment: LE ther ex technique education but pt very drowsy and will need reinforcement of education provided this date.                   Point: Precautions (Not Started)     Learner Progress:  Not documented in this visit.                      User Key     Initials Effective Dates  Name Provider Type Discipline    TW 06/16/21 -  Jacqueline Horton PT Physical Therapist PT              PT Recommendation and Plan  Planned Therapy Interventions (PT): balance training, bed mobility training, gait training, strengthening, transfer training, patient/family education  Plan of Care Reviewed With: patient  Progress: no change  Outcome Summary: PT treatment completed bedside this pm with pt presenting on EOB with nursing just completing transfer to bed from chair. Pt agreed to PT treatment with  AAROM for BLE 2 x 5 performed including: hip abd, heelslides, ankle pumps with pt being very drowsy and inconsistent with participation. Pt did have c/o pain with R hip movement and B ankle tremors noted with heelcord stretching. Pt was then positioned on the R side rolling with mod assist to take pressure of Buttocks. Cont PT POC as pt is able to tolerate.     Time Calculation:   PT Charges     Row Name 08/01/21 1320             Time Calculation    Start Time  1309  -TW      Stop Time  1320  -TW      Time Calculation (min)  11 min  -TW      PT Received On  08/01/21  -TW         Timed Charges    85165 - PT Therapeutic Exercise Minutes  11  -TW         Total Minutes    Timed Charges Total Minutes  11  -TW       Total Minutes  11  -TW        User Key  (r) = Recorded By, (t) = Taken By, (c) = Cosigned By    Initials Name Provider Type    TW Jacqueline Horton PT Physical Therapist        Therapy Charges for Today     Code Description Service Date Service Provider Modifiers Qty    09784562982 HC PT EVAL MOD COMPLEXITY 3 7/31/2021 Jacqueline Horton, PT GP 1    67535620208 HC PT THER PROC EA 15 MIN 8/1/2021 Jacqueline Horton PT GP 1          PT G-Codes  Outcome Measure Options: AM-PAC 6 Clicks Basic Mobility (PT)  AM-PAC 6 Clicks Score (PT): 10    Jacqueline Horton PT  8/1/2021

## 2021-08-02 NOTE — PROGRESS NOTES
[facility] Progress Note      Patient Name: Jani Pena  : 1950  MRN: [id number]  Primary Care Physician:  Miguel Rojas MD  Date of admission: 2021    Subjective     Interval History:   Follow-up acute kidney injury on chronic kidney disease  The patient is sleep difficult to arouse but when he woke up he denied having chest pain or shortness of air, denies nausea or vomiting, denies abdominal pain, he has Tinajero catheter anchored.  He does appear fairly comfortable.    Review of Systems:   As noted above    Objective     Vitals:   Temp:  [97.5 °F (36.4 °C)-98 °F (36.7 °C)] 97.6 °F (36.4 °C)  Heart Rate:  [58-89] 64  Resp:  [16-22] 18  BP: (104-126)/(52-71) 126/52  Flow (L/min):  [5] 5    Intake/Output Summary (Last 24 hours) at 2021 1018  Last data filed at 2021 0500  Gross per 24 hour   Intake 1521.67 ml   Output 1000 ml   Net 521.67 ml       Physical Exam:    General Appearance: Appears to be chronically ill, obese, lethargic, arousable but falls asleep, no acute distress   Skin: warm and dry  HEENT: oral mucosa dry , nonicteric sclera  Neck: supple, no JVD  Lungs: Bilateral rhonchi, unlabored breathing effort  Heart: Irregularly irregular, no rub  Abdomen: soft, nontender, nondistended, normoactive bowel  : no palpable bladder, Tinajero catheter anchored in place  Extremities: no edema, cyanosis or clubbing  Neuro: Moving all extremities    Scheduled Meds:     aspirin, 81 mg, Oral, Daily  atorvastatin, 10 mg, Oral, Q PM  bisacodyl, 10 mg, Rectal, Daily  bisoprolol, 10 mg, Oral, Q24H  carboxymethylcellulose, 1 drop, Both Eyes, TID  clopidogrel, 75 mg, Oral, Daily  escitalopram, 10 mg, Oral, Daily  finasteride, 5 mg, Oral, Daily  hydrALAZINE, 10 mg, Oral, Q8H  ipratropium-albuterol, 3 mL, Nebulization, Q6H - RT  isosorbide dinitrate, 10 mg, Oral, TID - Nitrates  lactobacillus acidophilus, 1 capsule, Oral, BID  mupirocin, , Topical, Q12H  pantoprazole, 40 mg,  Oral, Daily  piperacillin-tazobactam, 3.375 g, Intravenous, Q8H  polyethylene glycol, 17 g, Oral, Daily  senna-docusate sodium, 1 tablet, Oral, BID  sodium chloride, 10 mL, Intravenous, Q12H  tamsulosin, 0.4 mg, Oral, Nightly      IV Meds:   heparin, 12 Units/kg/hr, Last Rate: 10 Units/kg/hr (08/02/21 0221)  Pharmacy to dose vancomycin,   sodium chloride, 100 mL/hr, Last Rate: 100 mL/hr (08/02/21 0333)        Results Reviewed:   I have personally reviewed the results from the time of this admission to 8/2/2021 10:18 EDT     Results from last 7 days   Lab Units 08/02/21 0745 08/01/21  0558 07/31/21  0656 07/30/21  0510 07/30/21  0510   SODIUM mmol/L 139 139 137   < > 135*   POTASSIUM mmol/L 3.8 4.1 4.1   < > 4.3   CHLORIDE mmol/L 90* 90* 88*   < > 88*   CO2 mmol/L 35.9* 35.2* 31.3*   < > 33.7*   BUN mg/dL 90* 99* 101*   < > 104*   CREATININE mg/dL 3.16* 3.49* 3.38*   < > 3.28*   CALCIUM mg/dL 8.6 8.9 8.2*   < > 8.5*   BILIRUBIN mg/dL 0.7  --  0.9  --  1.0   ALK PHOS U/L 96  --  106  --  105   ALT (SGPT) U/L 9  --  9  --  10   AST (SGOT) U/L 20  --  25  --  27   GLUCOSE mg/dL 110* 113* 84   < > 87    < > = values in this interval not displayed.       Estimated Creatinine Clearance: 22.6 mL/min (A) (by C-G formula based on SCr of 3.16 mg/dL (H)).    Results from last 7 days   Lab Units 08/02/21 0745 08/01/21 0558 07/31/21  0656   MAGNESIUM mg/dL 3.0* 3.0*  --    PHOSPHORUS mg/dL 4.7* 6.2* 5.8*       Results from last 7 days   Lab Units 08/02/21 0745 08/01/21  0558   URIC ACID mg/dL 10.8* 11.4*       Results from last 7 days   Lab Units 08/02/21  0745 08/01/21  1544 07/31/21  0656 07/30/21  0510 07/29/21  0437 07/28/21  1502 07/28/21  1502   WBC 10*3/mm3 14.80* 13.33* 17.71* 19.90* 22.50*   < > 19.56*   HEMOGLOBIN g/dL 8.2* 7.8* 7.9* 7.7* 7.6*   < > 8.8*   PLATELETS 10*3/mm3 259 246  --  167 154  --  151    < > = values in this interval not displayed.       Results from last 7 days   Lab Units 08/01/21  1544   INR   1.19*       Assessment / Plan     ASSESSMENT:  1.  Acute kidney injury associated with hemodynamic changes could be related to cardiorenal syndrome, his creatinine has been stable for the past 24 hours and his volume status appears to be reasonable, creatinine 3.49, BUN is down to 99, electrolytes within acceptable, except total CO2 35.2, uric acid 11.4  2.  Coronary artery disease with prior CABG  3.  Cardiomyopathy ejection fraction 25 to 30% with AICD in situ  4.  Anemia, with mild iron deficiency hemoglobin yesterday was seven-point  5.  Acute respiratory failure appears to be  6.  Gram-positive sepsis  7.  Hyperuricemia associated with acute kidney injury decreased effective intravascular volume  8.  Metabolic alkalosis probably associated with some degree of volume contraction    PLAN:  1.  Continue with the current treatment plan and surveillance labs.  2.  No diuretics today  3.  Palliative care evaluation is in progress, family discussions ongoing.      I discussed the case with the patient    Thank you for involving us in the care of Jani Pena.  Please feel free to call with any questions.    Markel Owens MD, FASN  08/02/21  10:18 EDT    [facility]  [phone]      Much of this encounter note is an electronic transcription/translation of spoken language to printed text. The electronic translation of spoken language may permit erroneous, or at times, nonsensical words or phrases to be inadvertently transcribed; Although I have reviewed the note for such errors, some may still exist.

## 2021-08-02 NOTE — CASE MANAGEMENT/SOCIAL WORK
"Continued Stay Note  [facility]     Patient Name: Jani Pena  MRN: [id number]  Today's Date: 8/2/2021    Admit Date: 7/28/2021    Discharge Plan     Row Name 08/02/21 1424       Plan    Plan Life Vest and AICD is not being considered now due to dismal pulmonary status.  Pt is a resident of [facility].  Spoke to pt in room to discuss DCP.  Stated \"I don't think I'm going to make it.\"  O2 is currently on 5 liters.  Pt was ask about family members visiting, son Joao and states \"No I don't know what to say to him.\" Daughter April and responded yes.  Pt is to be seen by Dr Enrique and Palliative Care today.  Cm will continue to follow.        Discharge Codes    No documentation.       Expected Discharge Date and Time     Expected Discharge Date Expected Discharge Time    Aug 2, 2021             Belgica Barry RN    "

## 2021-08-02 NOTE — CONSULTS
[facility]    INPATIENT ADVANCED CARE PLANNING CONSULT      Name:  Jani Pean   Age:  71 y.o.  Sex:  male  :  1950  MRN:  [id number]   Visit Number:  [id number]  Date of Service: 2021  Date of Admission:  2021  Primary Care Physician:  Miguel Rojas MD      Consulting Physician:    PAL Witt    Reason For Consult:    Goals of care in the setting of complex medical course and progressive decline    Brief Summary:    I reviewed in detail patient's clinical course on admission and sequence of events since then as reflected by history and physical, progress notes, consults, staff input and work-up.  Mr. Gunter is a 71 years old gentleman with history of multiple medical problems and comorbidities including coronary artery disease, diastolic heart failure, severe COPD on chronic hypoxic respiratory failure who was hospitalized with a diagnosis of lateral lower lobe pneumonia and MRSA bacteremia and subsequently MRSA endocarditis.  He continued to be hypoxic and frail with progressive decline.  Palliative care consultation was requested to address goals of care given above presentation.  Advance Care Planning   1.  Determination of decisional capacity:    ·  During my assessment, patient was unable to engage in a conversation or articulate his wishes    2.  Advanced Directives:    · FULL CODE STATUS  · Full medical management  · Patient does have a living will    3.  Patient & Family Meeting    · Meeting attendees: Mr. Gunter, then contacted his daughter April by phone  · Meeting location: Initially patient's room by phone    Summary of the discussion:    · Initial assessment, patient was lying comfortably in his bed, he responded to calling his name but then drifted in and out of sleep.  He was unable to focus on conversation as he remained lethargic when awake.  · Therefore, contacted his daughter April who was in [place].  I had multiple conversations with her  regarding patient's condition, clinical course, medical management and prognosis.  · As April became aware of patient's complex clinical course, I inquired about patient's previously expressed wishes regarding his CODE STATUS and medical management.  She stated that he would not elect to have his life prolonged in the event of poor prognosis or compromised quality of life.  She was unsure regarding his position on resuscitation.  After discussing all aspects of this issue, April elected to connect with her family prior to finalizing decisions.  · On a repeat visit to patient, he responded to my question regarding resuscitation by selecting [allow nature to take its course].  However, I could not confirm his decisional capacity and I expressed the same to his daughter with encouragement to assume her role as next of kin in addressing decisions at hand.  April expressed understanding and agreement with the same my; While she saddened by her father's clinical course, she welcomed the responsibility.  On the other hand, she felt obligated to communicate with her father in person and prefers to await her return to finalize her decisions regarding goals of care and future plans.  · We concluded the discussion by planning to further communicate till her return.  · Above was communicated to primary attending who concurred and will follow on further plans.    Conclusions:    · Code status: FULL CODE STATUS  · Medical interventions: Medical management  · Advanced directives: Living will on record       I appreciate the opportunity to participate in Mr. Gunter's care.  Please feel free to contact me for any upcoming questions or concerns as need arises.     Total time spent in counseling and advanced care planning discussion per above documentation 90 min.     Dragon system was utilized for this dictation; therefore unintended spelling or expressions may be noted in the body of this document.

## 2021-08-02 NOTE — PLAN OF CARE
Goal Outcome Evaluation:  Plan of Care Reviewed With: patient        Progress: no change  Outcome Summary: Pt appeared to have slept well last night and wore bipap all night. Patient has had frequent stools through out the night. Negative for occult blood. Small amount of blood noted in the catheter tubing. Heparin drip infusing.

## 2021-08-02 NOTE — PLAN OF CARE
Pt was readmitted to the hospital for c/o dyspnea, cough, intermittent chest pain, ab pain and orthopnea.    Admit dx:  Acute on chronic respiratory failure with hypoxia and hypercapnia; Community acquired PNU; Acute kidney injury    PMH:  Atherosclerotic heart dx; CAD with angina; A. Fib; CHF; COPD; CKD    Code status:  DNR/DNI    Living Will on file, but has no designated HC surrogate      Dr. Garcia and I attempted to visit with patient.  He is laying in bed asleep.  He looks very frail and tired.   He opened his eyes immediately when his name was called.  However, he was not able to keep them open long enough to fully answer questions.  Pt was able to respond that he was not doing well and verbalize that he thinks he will not be able to return to [facility] in his current state.  After several attempts to ask him about his goals of care, he was asked if he wanted us to speak with family.  He agreed for us to speak with his daughter.    Dr. Garcia spoke to April (daughter) via phone call.  She is currently out of town.  She voiced understanding that he has been declining.  However, she did not want to make decisions for his care until she was able to speak with her siblings and patient's siblings.  She will plan speak with them and f/u tomorrow.    Dr. Garcia received phone call from pt's sister.  Update re pt's current condition discussed.  His sister indicated familiarity with the [facility].  She was appreciated for the information received and reports that they will continue to discuss future goals as a family.     Palliative services will continue to follow.

## 2021-08-02 NOTE — PLAN OF CARE
Goal Outcome Evaluation:  Plan of Care Reviewed With: patient, family   Lethargic today and unable to work with therapy or to eat significantly.  Family met with hospice physician to consider options.  Pain controlled with prn meds.

## 2021-08-02 NOTE — PROGRESS NOTES
[facility]    Name:  Jani Pena   Age:  71 y.o.  Sex:  male  :  1950  MRN:  [id number]   Visit Number:  [id number]  Admission Date:  2021  Date Of Service:  21  Primary Care Physician:  Miguel Rojas MD     LOS: 5 days :    Chief Complaint:      Generalized weakness/ abdominal pain       Subjective:    Patient seen and evaluated today after reviewing the chart since admission.  He is resting in bed with no distress noted, and wakes to have appropriate conversation.  Patient is being followed by Nephrology for ARTIS on CKD and is diuresing well with lee catheter in place.  Tells me he is still having multiple episodes of diarrhea and his abdomen is tender.  Dr. Allen has recommended a Life Vest due to Vtach that was noted on telemetry until follow up with Dr. Bass could be arranged.  We did discuss Life Vest briefly, however, patient drifts back to sleep quickly.  We talked about Palliative Care team evaluation today, and patient is agreeable.     Hospital Course:    Mr. Pena is a 71-year-old gentleman with chronic comorbidities including prior CABG, diastolic congestive heart failure, CAD, severe COPD with chronic hypoxic/hypercapnic respiratory failure, chronic kidney disease stage III, who presented from assisted living facility due to abdominal pain and shortness of breath with cough.  Chest x-ray showed atelectasis.  Patient was given IV antibiotics therapy, IV Lasix and morphine in the emergency room.  CT scan abdomen and pelvis without contrast with findings concerning for acute cholecystitis.  Patient was also noted to have worsening creatinine level from his baseline.     Patient was admitted to the medical floor and was placed on IV antibiotics therapy with Zosyn.  Dr. Donovan from nephrology was consulted and he recommended IV diuretic therapy with Bumex.  He was seen by Dr. Mcdonald from surgical services who did not feel that  the patient had clinical evidence of cholecystitis and recommended HIDA scan.  He also felt that the patient would be high surgical risk candidate for any open gallbladder surgery.     Both of the patient's blood cultures done in the emergency room came back positive for MRSA and he was placed on vancomycin.  Patient had a HIDA scan and it showed normal filling of the gallbladder not suggestive of cholecystitis.  Patient was seen by Dr. Maldonado and he recommended no further interventions for his gallbladder at this time.  Patient was noted to have nonsustained ventricular tachycardia on telemetry and Dr. Allen from cardiology was consulted.       Review of Systems:     All systems were reviewed and negative except as mentioned in subjective, assessment and plan.    Vital Signs:    Temp:  [97.5 °F (36.4 °C)-98 °F (36.7 °C)] 97.7 °F (36.5 °C)  Heart Rate:  [58-81] 65  Resp:  [16-22] 18  BP: ()/(51-71) 97/51    Intake and output:    I/O last 3 completed shifts:  In: 2241.7 [P.O.:960; I.V.:1231.7; IV Piggyback:50]  Out: 1850 [Urine:1850]  I/O this shift:  In: 120 [P.O.:120]  Out: -     Physical Examination:    General Appearance:  Alert and cooperative, chronically ill male   Head:  Atraumatic and normocephalic.   Eyes: Conjunctivae and sclerae normal, no icterus. No pallor.   Throat: No oral lesions, no thrush, oral mucosa moist.   Neck: Supple, trachea midline   Lungs:   Breath sounds heard bilaterally equally but diminished throughout.  No crackles or wheezing. Utilizing 5L on exam with stable saturations, unlabored in conversation.   Heart:  Normal S1 and S2, no murmur, no gallop, no rub. No JVD. Pacemaker to right chestwall.   Abdomen:   Normal bowel sounds, no masses, no organomegaly. Soft, diffusely tender, nondistended, no rebound tenderness.   Extremities: Supple, no edema, no cyanosis, no clubbing.   Skin: No bleeding or rash.   Neurologic: Alert and oriented x 3. No facial asymmetry. Moves all four  limbs with generalized weakness noted. No tremors.      Laboratory results:    Results from last 7 days   Lab Units 08/02/21  0745 08/01/21  0558 07/31/21  0656 07/30/21  0510 07/30/21  0510   SODIUM mmol/L 139 139 137   < > 135*   POTASSIUM mmol/L 3.8 4.1 4.1   < > 4.3   CHLORIDE mmol/L 90* 90* 88*   < > 88*   CO2 mmol/L 35.9* 35.2* 31.3*   < > 33.7*   BUN mg/dL 90* 99* 101*   < > 104*   CREATININE mg/dL 3.16* 3.49* 3.38*   < > 3.28*   CALCIUM mg/dL 8.6 8.9 8.2*   < > 8.5*   BILIRUBIN mg/dL 0.7  --  0.9  --  1.0   ALK PHOS U/L 96  --  106  --  105   ALT (SGPT) U/L 9  --  9  --  10   AST (SGOT) U/L 20  --  25  --  27   GLUCOSE mg/dL 110* 113* 84   < > 87    < > = values in this interval not displayed.     Results from last 7 days   Lab Units 08/02/21  0745 08/01/21  1544 07/31/21  0656 07/30/21  0510 07/30/21  0510   WBC 10*3/mm3 14.80* 13.33* 17.71*   < > 19.90*   HEMOGLOBIN g/dL 8.2* 7.8* 7.9*   < > 7.7*   HEMATOCRIT % 27.3* 25.7* 25.9*   < > 25.2*   PLATELETS 10*3/mm3 259 246  --   --  167    < > = values in this interval not displayed.     Results from last 7 days   Lab Units 08/01/21  1544   INR  1.19*     Results from last 7 days   Lab Units 07/31/21  0205 07/28/21  1502   TROPONIN T ng/mL 0.065* 0.077*     Results from last 7 days   Lab Units 08/01/21  2310 07/30/21  0000 07/28/21  1854   BLOODCX  No growth at less than 24 hours  --  Staphylococcus aureus, MRSA*  Staphylococcus aureus, MRSA*   URINECX   --  No growth  --      Results from last 7 days   Lab Units 07/28/21  1511   PH, ARTERIAL pH units 7.447   PO2 ART mm Hg 178.0*   PCO2, ARTERIAL mm Hg 59.0*   HCO3 ART mmol/L 40.6*     I have reviewed the patient's laboratory results.    Radiology results:    No radiology results from the last 24 hrs  I have reviewed the patient's radiology reports.    Medication Review:     I have reviewed the patient's active and prn medications.     Problem List:      Pneumonia of both lower lobes due to methicillin  resistant Staphylococcus aureus (MRSA) (CMS/[facility])    Acute respiratory failure with hypercapnia (CMS/[facility])    Calculus of gallbladder without cholecystitis without obstruction    MRSA bacteremia      Assessment:    1. Acute on chronic respiratory failure with hypoxia and hypercapnia, POA.  2. Sepsis with MRSA bacteremia likely secondary to #3, POA.  3. Bilateral bacterial pneumonia, likely MRSA, POA.  4. Suspected cholecystitis, POA.  5. Acute renal failure on chronic kidney disease stage III, POA.  6. Acute on chronic systolic heart failure with ejection fraction of 25%  7. Coronary artery disease status post CABG.  8. Ischemic cardiomyopathy.  9. Sick sinus syndrome status post pacemaker.  10. Anemia of chronic kidney disease.    Plan:    Vancomycin and Zosyn completing today for 5 day course.  Recheck blood cultures with KATE yesterday with gram + cocci growing.   Initial blood cultures grew MRSA as did nasal swab.  Will extend out Vancomycin IV for 2 more days for 7 days total at this time.  Suspected cholecystitis does not require surgical intervention at this time per Dr. Maldonado notes.   ARTIS being followed by Nephrology with creatinine improved today to 3.16.  Diuresing well with lee catheter in place.  Patient currently with pacemaker in place with recommendations for AICD and Life Vest until further evaluation by primary cardiologist (Dr. Bass) per Dr. Allen notes.  Discussed with Dr. Bass who advised that ICD placement and/ or Life Vest placement would not be considered due to dismal pulmonary status.  Palliative team has been consulted today for possible hospice discussions.  Continue to monitor closely as patient's condition is guarded.    DVT Prophylaxis: Heparin  Code Status: DNR  Diet: N.p.o. except for ice chips.  Discharge Plan: Pending    Anna Piña, PAL  08/02/21  13:01 EDT    Dictated utilizing Dragon dictation.

## 2021-08-03 NOTE — PROGRESS NOTES
Adult Nutrition  Assessment/PES    Patient Name:  Jani Pena  YOB: 1950  MRN: [id number]  Admit Date:  7/28/2021    Assessment Date:  8/3/2021    Comments:    Recommend:  1. Consider adding renal modification to current diet order as medically appropriate and tolerated.  2. Establish and encourage PO intake.  3. RD ordered Boost Pudding TID.  4. Consider a renal multivitamin with minerals daily.  5. Continue to monitor and replace electrolytes PRN.    RD to follow pt and available PRN.      Reason for Assessment     Row Name 08/03/21 1503          Reason for Assessment    Reason For Assessment  follow-up protocol     Diagnosis  diabetes diagnosis/complications;cardiac disease;gastrointestinal disease;pulmonary disease;renal disease;other (see comments) Anemia, Heart failure, A/C respiratory failure, ARTIS/CKD Stage 3     Identified At Risk by Screening Criteria  difficulty chewing/swallowing;large or nonhealing wound, burn or pressure injury             Labs/Tests/Procedures/Meds     Row Name 08/03/21 1505          Labs/Procedures/Meds    Lab Results Reviewed  reviewed, pertinent     Lab Results Comments  Low: Cl-, Alb High: BUN, Cr, Phos, Mg++, Procal        Medications    Pertinent Medications Reviewed  reviewed, pertinent     Pertinent Medications Comments  Lipitor, Dulcolax, Protonix, Miralax, Pericolace         Physical Findings     Row Name 08/03/21 1507          Physical Findings    Skin  pressure injury Upper sacral spine           Nutrition Prescription Ordered     Row Name 08/03/21 1507          Nutrition Prescription PO    Current PO Diet  Pureed         Evaluation of Received Nutrient/Fluid Intake     Row Name 08/03/21 1507          PO Evaluation    Number of Days PO Intake Evaluated  3 days     Number of Meals  3     % PO Intake  0               Problem/Interventions:  Problem 1     Row Name 08/03/21 1507          Nutrition Diagnoses Problem 1    Problem 1  Inadequate  Intake/Infusion     Inadequate Intake Type  Oral     Macronutrient  Kcal;Fat;Fiber;Protein;Carbohydrate;Fluid     Micronutrient  Vitamin;Mineral     Etiology (related to)  MNT for Treatment/Condition     Signs/Symptoms (evidenced by)  NPO     Resolved?  Yes Diet order advanced         Problem 2     Row Name 08/03/21 1508          Nutrition Diagnoses Problem 2    Problem 2  Impaired Nutrient Utilization     Etiology (related to)  Medical Diagnosis     Endocrine  DM Type 2     Renal  CKD;ARTIS     Signs/Symptoms (evidenced by)  Biochemical     Specific Labs Noted  BUN;Creatinine             Intervention Goal     Row Name 08/03/21 1510          Intervention Goal    General  Meet nutritional needs for age/condition;Improved nutrition related lab(s)     PO  Meet estimated needs;Establish PO;PO intake (%)     PO Intake %  50 %     Weight  Maintain weight         Nutrition Intervention     Row Name 08/03/21 1510          Nutrition Intervention    RD/Tech Action  Follow Tx progress;Encourage intake;Recommend/ordered     Recommended/Ordered  Supplement         Nutrition Prescription     Row Name 08/03/21 1510          Nutrition Prescription PO    PO Prescription  Begin/change diet     Begin/Change Diet to  Pureed     Supplement  Boost Pudding     Supplement Frequency  3 times a day     Common Modifiers  Renal     New PO Prescription Ordered?  Yes Supplement ordered        Other Orders    Obtain Weight  Daily     Obtain Weight Ordered?  No, recommended     Supplement  Vitamin mineral supplement Renal     Supplement Ordered?  No, recommended     Other  Continue to monitor and replace electrolytes PRN         Education/Evaluation     Row Name 08/03/21 1511          Education    Education  Education not appropriate at this time     Please explain  Other (comment) Palliative care team following        Monitor/Evaluation    Monitor  Per protocol;I&O;PO intake;Supplement intake;Pertinent labs;Weight;Skin status            Electronically signed by:  Fabiana Jonas RD  08/03/21 15:12 EDT

## 2021-08-03 NOTE — THERAPY DISCHARGE NOTE
Spoke with Dr. Alas today regarding pt being appropriate for therapy.  At this time, OT will sign off pts case.  If pt improves, please reconsult therapy at that time.

## 2021-08-03 NOTE — PROGRESS NOTES
[facility] Progress Note      Patient Name: Jani Pena  : 1950  MRN: [id number]  Primary Care Physician:  Miguel Rojas MD  Date of admission: 2021    Subjective     Interval History:   Follow-up acute kidney injury on chronic kidney disease  The patient is sleep difficult to arouse but when he woke up he denied having chest pain or shortness of air, denies nausea or vomiting, denies abdominal pain, he has Tinajero catheter anchored.  He does appear fairly comfortable.    Review of Systems:   As noted above    Objective     Vitals:   Temp:  [97.7 °F (36.5 °C)-98.2 °F (36.8 °C)] 97.9 °F (36.6 °C)  Heart Rate:  [63-75] 64  Resp:  [15-20] 20  BP: ()/(51-55) 117/52  Flow (L/min):  [5] 5    Intake/Output Summary (Last 24 hours) at 8/3/2021 0941  Last data filed at 8/3/2021 0500  Gross per 24 hour   Intake 3106 ml   Output 350 ml   Net 2756 ml       Physical Exam:    General Appearance: Appears to be chronically ill, obese, lethargic, arousable but falls asleep, no acute distress   Skin: warm and dry  HEENT: oral mucosa dry , nonicteric sclera  Neck: supple, no JVD  Lungs: Bilateral rhonchi, unlabored breathing effort  Heart: Irregularly irregular, no rub  Abdomen: soft, nontender, nondistended, normoactive bowel  : no palpable bladder, Tinajero catheter anchored in place  Extremities: no edema, cyanosis or clubbing  Neuro: Moving all extremities    Scheduled Meds:     aspirin, 81 mg, Oral, Daily  atorvastatin, 10 mg, Oral, Q PM  bisacodyl, 10 mg, Rectal, Daily  bisoprolol, 10 mg, Oral, Q24H  carboxymethylcellulose, 1 drop, Both Eyes, TID  clopidogrel, 75 mg, Oral, Daily  escitalopram, 10 mg, Oral, Daily  finasteride, 5 mg, Oral, Daily  hydrALAZINE, 10 mg, Oral, Q8H  ipratropium-albuterol, 3 mL, Nebulization, Q6H - RT  isosorbide dinitrate, 10 mg, Oral, TID - Nitrates  lactobacillus acidophilus, 1 capsule, Oral, BID  mupirocin, , Topical, Q12H  pantoprazole, 40 mg,  Oral, Daily  polyethylene glycol, 17 g, Oral, Daily  senna-docusate sodium, 1 tablet, Oral, BID  sodium chloride, 10 mL, Intravenous, Q12H  tamsulosin, 0.4 mg, Oral, Nightly      IV Meds:   heparin, 12 Units/kg/hr, Last Rate: 10 Units/kg/hr (08/02/21 4356)  Pharmacy to dose vancomycin,   sodium chloride, 75 mL/hr, Last Rate: 75 mL/hr (08/02/21 0355)        Results Reviewed:   I have personally reviewed the results from the time of this admission to 8/3/2021 09:41 EDT     Results from last 7 days   Lab Units 08/03/21  0506 08/02/21  0745 08/01/21  0558 07/31/21  0656 07/31/21  0656   SODIUM mmol/L 139 139 139   < > 137   POTASSIUM mmol/L 3.6 3.8 4.1   < > 4.1   CHLORIDE mmol/L 93* 90* 90*   < > 88*   CO2 mmol/L 33.2* 35.9* 35.2*   < > 31.3*   BUN mg/dL 95* 90* 99*   < > 101*   CREATININE mg/dL 3.85* 3.16* 3.49*   < > 3.38*   CALCIUM mg/dL 8.2* 8.6 8.9   < > 8.2*   BILIRUBIN mg/dL 0.6 0.7  --   --  0.9   ALK PHOS U/L 85 96  --   --  106   ALT (SGPT) U/L 10 9  --   --  9   AST (SGOT) U/L 27 20  --   --  25   GLUCOSE mg/dL 94 110* 113*   < > 84    < > = values in this interval not displayed.       Estimated Creatinine Clearance: 19.8 mL/min (A) (by C-G formula based on SCr of 3.85 mg/dL (H)).    Results from last 7 days   Lab Units 08/02/21  0745 08/01/21  0558 07/31/21  0656   MAGNESIUM mg/dL 3.0* 3.0*  --    PHOSPHORUS mg/dL 4.7* 6.2* 5.8*       Results from last 7 days   Lab Units 08/02/21  0745 08/01/21  0558   URIC ACID mg/dL 10.8* 11.4*       Results from last 7 days   Lab Units 08/03/21  0506 08/02/21  0745 08/01/21  1544 07/31/21  0656 07/30/21  0510 07/29/21  0437 07/29/21  0437   WBC 10*3/mm3 11.76* 14.80* 13.33* 17.71* 19.90*   < > 22.50*   HEMOGLOBIN g/dL 7.4* 8.2* 7.8* 7.9* 7.7*   < > 7.6*   PLATELETS 10*3/mm3 249 259 246  --  167  --  154    < > = values in this interval not displayed.       Results from last 7 days   Lab Units 08/01/21  1544   INR  1.19*       Assessment / Plan     ASSESSMENT:  1.  Acute  kidney injury associated with hemodynamic changes could be related to cardiorenal syndrome, his creatinine has been stable for the past 24 hours and his volume status appears to be reasonable.  2.  Coronary artery disease with prior CABG  3.  Cardiomyopathy ejection fraction 25 to 30% with AICD in situ  4.  Anemia, with mild iron deficiency hemoglobin yesterday was seven-point  5.  Acute respiratory failure appears to be  6.  Gram-positive sepsis  7.  Hyperuricemia associated with acute kidney injury decreased effective intravascular volume  8.  Metabolic alkalosis probably associated with some degree of volume contraction    PLAN:  Clinically appears to be fairly stable, has some worsening renal function, still has potassium, bicarb under reasonable control and no signs of uremia.  It does appear that most likely he will end up requiring dialysis if renal function continues to worsen.  Family meeting is scheduled for later today to make the further decisions about aggressive care.  Continue with the current treatment plan and surveillance labs.  No diuretics today and stop the IV fluids.  Palliative care evaluation is in progress, family discussions ongoing.      I discussed the case with the patient    Thank you for involving us in the care of Jani Pena.  Please feel free to call with any questions.    Markel Owens MD, FASN  08/03/21  09:41 EDT    [facility]  [phone]      Much of this encounter note is an electronic transcription/translation of spoken language to printed text. The electronic translation of spoken language may permit erroneous, or at times, nonsensical words or phrases to be inadvertently transcribed; Although I have reviewed the note for such errors, some may still exist.

## 2021-08-03 NOTE — PLAN OF CARE
Dr. Garcia and I followed up with pt in his room.  He is awake sitting up in bed with bipap in place.  Pt appears to be more alert and relaxed.  A family friend is at bedside.  Pt's friend left the room during conversation.  Dr. Garcia reminded pt of yesterday's conversation.   Pt's current health status of his heart and lack of  further treatment interventions, and his inability to return to [facility] at this time discussed.   Pt was asked if he would like to continue coming to the hospital or focus on comfort.  Pt reports that he would like to focus on comfort.      [facility] discussed.  Pt made the statement that [facility] was the last place they put you.  Clarification of care provided at the [facility] was discussed.   Pt reports familiarity with the [facility] as his wife passed there.  Pt was in agreement to go to the [facility].  Pt denies any further needs or questions.    Code status clarified as DNR/DNI.  EMS/DNR form discussed.  Pt verbalized understanding and signed form.  Myself and VERNON Buchanan witnessed form.  Original copy was placed in pt's chart.    Called HCP; spoke to Emily.  Referral given for discharge to the [facility] tomorrow.  Covid test will need to be updated as it has been longer than 48 hrs since last test.  PAL Castillo and VERNON Buchanan updated.

## 2021-08-03 NOTE — PLAN OF CARE
Goal Outcome Evaluation:              Outcome Summary: VSS att. Requiring use of his bi-pap this shift. Complaint of pain and axiety, see MAR. Will continue to monitor.

## 2021-08-03 NOTE — PLAN OF CARE
Goal Outcome Evaluation:  Plan of Care Reviewed With: patient        Progress: declining  Outcome Summary: VSS.  Pt reported feeling worse today and having trouble swallowing meds.  Pt c/o pain that was controlled with PRN meds.  No other changes to report during shift.  Will continue to monitor.

## 2021-08-03 NOTE — PROGRESS NOTES
[facility]    [facility]ESS NOTE    Name:  Jani Pena   Age:  71 y.o.  Sex:  male  :  1950  MRN:  [id number]   Visit Number:  [id number]  Admission Date:  2021  Date Of Service:  21  Primary Care Physician:  Miguel Rojas MD     LOS: 6 days :    Chief Complaint:      Generalized weakness/ Abdominal pain    Subjective:    Patient seen and evaluated today after reviewing the chart since admission.  Patient laying in bed with Bipap in place.  Asks me if he is going to die.  We discussed his situation and co-morbidities.  Talked about palliative care team who is consulted.  Appreciate nephrology following ARTIS.  Tinajero catheter in place for strict I&Os.  Complaints of left sided mid back pain today.  Denies abdominal pain, states his breathing is just ok today.    Hospital Course:    Mr. Pena is a 71-year-old gentleman with chronic comorbidities including prior CABG, diastolic congestive heart failure, CAD, severe COPD with chronic hypoxic/hypercapnic respiratory failure, chronic kidney disease stage III, who presented from assisted living facility due to abdominal pain and shortness of breath with cough.  Chest x-ray showed atelectasis.  Patient was given IV antibiotics therapy, IV Lasix and morphine in the emergency room.  CT scan abdomen and pelvis without contrast with findings concerning for acute cholecystitis.  Patient was also noted to have worsening creatinine level from his baseline.     Patient was admitted to the medical floor and was placed on IV antibiotics therapy with Zosyn.  Dr. Donovan from nephrology was consulted and he recommended IV diuretic therapy with Bumex.  He was seen by Dr. Mcdonald from surgical services who did not feel that the patient had clinical evidence of cholecystitis and recommended HIDA scan.  He also felt that the patient would be high surgical risk candidate for any open gallbladder surgery.     Both of the patient's blood  cultures done in the emergency room came back positive for MRSA and he was placed on vancomycin.  Patient had a HIDA scan and it showed normal filling of the gallbladder not suggestive of cholecystitis.  Patient was seen by Dr. Maldonado and he recommended no further interventions for his gallbladder at this time.  Patient was noted to have nonsustained ventricular tachycardia on telemetry and Dr. Allen from cardiology was consulted.    Review of Systems:     All systems were reviewed and negative except as mentioned in subjective, assessment and plan.    Vital Signs:    Temp:  [97.8 °F (36.6 °C)-98.2 °F (36.8 °C)] 97.9 °F (36.6 °C)  Heart Rate:  [63-75] 68  Resp:  [15-20] 18  BP: (106-119)/(51-55) 117/52    Intake and output:    I/O last 3 completed shifts:  In: 4457.7 [P.O.:230; I.V.:4227.7]  Out: 850 [Urine:850]  No intake/output data recorded.    Physical Examination:    General Appearance:  Alert and cooperative, chronically ill male, flat affect   Head:  Atraumatic and normocephalic.   Eyes: Conjunctivae and sclerae normal, no icterus. No pallor.   Throat: No oral lesions, no thrush, oral mucosa moist.   Neck: Supple, trachea midline   Lungs:   Breath sounds heard bilaterally equally but diminished throughout.  No crackles or wheezing.  On Bipap during exam.   Heart:  Normal S1 and S2, no murmur, no gallop, no rub. No JVD.  Pacemaker noted to left chestwall.   Abdomen:   Normal bowel sounds, no masses, no organomegaly. Soft, nontender, nondistended, no rebound tenderness.   Extremities: Supple, no edema, no cyanosis, no clubbing.   Skin: No bleeding or rash.   Neurologic: Alert and oriented x 3. No facial asymmetry. Moves all four limbs. No tremors.      Laboratory results:    Results from last 7 days   Lab Units 08/03/21  0506 08/02/21  0745 08/01/21  0558 07/31/21  0656 07/31/21  0656   SODIUM mmol/L 139 139 139   < > 137   POTASSIUM mmol/L 3.6 3.8 4.1   < > 4.1   CHLORIDE mmol/L 93* 90* 90*   < > 88*   CO2  mmol/L 33.2* 35.9* 35.2*   < > 31.3*   BUN mg/dL 95* 90* 99*   < > 101*   CREATININE mg/dL 3.85* 3.16* 3.49*   < > 3.38*   CALCIUM mg/dL 8.2* 8.6 8.9   < > 8.2*   BILIRUBIN mg/dL 0.6 0.7  --   --  0.9   ALK PHOS U/L 85 96  --   --  106   ALT (SGPT) U/L 10 9  --   --  9   AST (SGOT) U/L 27 20  --   --  25   GLUCOSE mg/dL 94 110* 113*   < > 84    < > = values in this interval not displayed.     Results from last 7 days   Lab Units 08/03/21  0506 08/02/21  0745 08/01/21  1544   WBC 10*3/mm3 11.76* 14.80* 13.33*   HEMOGLOBIN g/dL 7.4* 8.2* 7.8*   HEMATOCRIT % 24.6* 27.3* 25.7*   PLATELETS 10*3/mm3 249 259 246     Results from last 7 days   Lab Units 08/01/21  1544   INR  1.19*     Results from last 7 days   Lab Units 07/31/21  0205 07/28/21  1502   TROPONIN T ng/mL 0.065* 0.077*     Results from last 7 days   Lab Units 08/02/21  0745 08/01/21  2310 07/30/21  0000 07/28/21  1854   BLOODCX  Growth present, too young to evaluate Staphylococcus aureus, MRSA*  --  Staphylococcus aureus, MRSA*  Staphylococcus aureus, MRSA*   URINECX   --   --  No growth  --      Results from last 7 days   Lab Units 07/28/21  1511   PH, ARTERIAL pH units 7.447   PO2 ART mm Hg 178.0*   PCO2, ARTERIAL mm Hg 59.0*   HCO3 ART mmol/L 40.6*     I have reviewed the patient's laboratory results.    Radiology results:    XR Chest 1 View    Result Date: 8/3/2021  PROCEDURE: XR CHEST 1 VW-  HISTORY: Left lower back pain; J96.02-Acute respiratory failure with hypercapnia; J18.9-Pneumonia, unspecified organism; P82-Rtggrdukcy condition; N17.9-Acute kidney failure, unspecified; I50.9-Heart failure, unspecified  COMPARISON: 07/28/2021.  FINDINGS: A left subclavian pacemaker is in place. The heart is enlarged. The mediastinum is unremarkable. There is interstitial pulmonary edema with small effusions. There is no pneumothorax.  There are no acute osseous abnormalities.      Impression: Cardiomegaly with interstitial edema and small effusions.  Continued  followup is recommended.  This report was finalized on 8/3/2021 12:07 PM by Richelle Patel M.D..    I have reviewed the patient's radiology reports.    Medication Review:     I have reviewed the patient's active and prn medications.     Problem List:      Pneumonia of both lower lobes due to methicillin resistant Staphylococcus aureus (MRSA) (CMS/[facility])    Acute respiratory failure with hypercapnia (CMS/[facility])    Calculus of gallbladder without cholecystitis without obstruction    MRSA bacteremia      Assessment:    1. Acute on chronic respiratory failure with hypoxia and hypercapnia, POA.  2. Sepsis with MRSA bacteremia likely secondary to #3, POA.  3. Bilateral bacterial pneumonia, likely MRSA, POA.  4. Suspected cholecystitis, POA.  5. Acute renal failure on chronic kidney disease stage III, POA.  6. Acute on chronic systolic heart failure with ejection fraction of 25%  7. Coronary artery disease status post CABG.  8. Ischemic cardiomyopathy.  9. Sick sinus syndrome status post pacemaker.  10. Anemia of chronic kidney disease.      Plan:    Vancomycin extended out to 7 day course.  Recheck blood cultures with KATE yesterday with gram + cocci growing.   Initial blood cultures grew MRSA as did nasal swab.  Completing IV Vancomycin tomorrow transitioning to Doxycycline PO for 14 days for bacteremia.  Repeat CXR today for left sided back pain noted cardiomegaly with interstitial edema and small effusions.  ARTIS being followed by Nephrology with creatinine worsened today at 3.85. Continue strict I&Os.       Suspected cholecystitis does not require surgical intervention at this time per Dr. Maldonado notes and patient received IV Zosyn for 5 days.   Patient currently with pacemaker in place with recommendations for AICD and Life Vest until further evaluation by primary cardiologist (Dr. Bass) per Dr. Allen notes.  Discussed with Dr. Bass who advised that ICD placement and/ or Life Vest placement would not be  considered due to dismal pulmonary status. Heparin drip has been stopped, Eliquis 5mg BID started tonight per Cardiology recommendation.      Palliative team has been consulted and is following for possible hospice discussions. They have been in contact with patient's daughter and are following up today. Will order anxiety PRN medications IV today as well as nursing advised patient would benefit.  Patient not eating or drinking well per his report as well.  Continue to monitor closely as patient's condition is guarded.  Discharge plan pending.    DVT Prophylaxis: Heparin  Code Status: DNR  Diet: N.p.o. except for ice chips.  Discharge Plan: Pending    Anna Piña, APRN  08/03/21  12:54 EDT    Dictated utilizing Dragon dictation.

## 2021-08-04 NOTE — PROGRESS NOTES
"Nephrology Progress Note.      LOS: 7 days    Patient Care Team:  Miguel Rojas MD as PCP - General (Internal Medicine)  Ulysses Bass MD as Cardiologist (Cardiology)    Chief Complaint:    Chief Complaint   Patient presents with   • Shortness of Breath       Subjective:   Follow up for ARTIS and Chronic Kidney disease stage 3    Interval History:   Patient Complaints: none  Patient seen and examined this morning.  Events from last 24 hours noted.  Patient has a BiPAP on, he is awake alert and interactive.  Denies having any fevers chills.  No nausea or vomiting no abdominal pain.  Denies any chest pain, shortness of breath or cough and sputum production.  There is no significant edema.   Patient also denies having new onset weakness of numbness of either extremity.  Ongoing discussions about inpatient hospice.  History taken from: patient    Objective:    Vital Signs  /53 (BP Location: Left arm, Patient Position: Lying)   Pulse 65   Temp 98.4 °F (36.9 °C) (Temporal)   Resp 18   Ht 172.7 cm (68\")   Wt 79.7 kg (175 lb 11.3 oz)   SpO2 98%   BMI 26.72 kg/m²     No intake/output data recorded.    Intake/Output Summary (Last 24 hours) at 8/4/2021 0928  Last data filed at 8/3/2021 1714  Gross per 24 hour   Intake 0 ml   Output 100 ml   Net -100 ml       Physical Exam:  General Appearance: alert, oriented x 3, no acute distress,   HEENT: Oral mucosa dry, extra occular movements intact. Sclera clear.  Skin: Warm and dry  Neck: supple, no JVD, trachea midline  Lungs:Chest shape is normal. Breath sounds heard bilaterally equally.  Coarse breath sounds all over the chest.  Heart: Irregular rate and rhythm. normal S1 and S2, no S3, no rub, peripheral pulses weak but palpable.  Abdomen: Obese, soft, non-tender,  present bowel sounds to auscultation.  Positive fluid thrill  : no palpable bladder.  Extremities: Trace edema, no cyanosis or clubbing.   Neuro: normal speech and mental status, grossly non " focal.     Results Review:   Results from last 7 days   Lab Units 08/04/21  0554 08/03/21  0506 08/02/21  0745   SODIUM mmol/L 143 139 139   POTASSIUM mmol/L 3.6 3.6 3.8   CHLORIDE mmol/L 96* 93* 90*   CO2 mmol/L 32.7* 33.2* 35.9*   BUN mg/dL 88* 95* 90*   CREATININE mg/dL 3.27* 3.85* 3.16*   CALCIUM mg/dL 8.5* 8.2* 8.6   ALBUMIN g/dL 3.10* 3.00* 3.20*   BILIRUBIN mg/dL 0.6 0.6 0.7   ALK PHOS U/L 86 85 96   ALT (SGPT) U/L 15 10 9   AST (SGOT) U/L 44* 27 20   GLUCOSE mg/dL 75 94 110*     Estimated Creatinine Clearance: 23.4 mL/min (A) (by C-G formula based on SCr of 3.27 mg/dL (H)).  Results from last 7 days   Lab Units 08/02/21  0745 08/01/21  0558 07/31/21  0656   MAGNESIUM mg/dL 3.0* 3.0*  --    PHOSPHORUS mg/dL 4.7* 6.2* 5.8*     Results from last 7 days   Lab Units 08/02/21  0745 08/01/21  0558   URIC ACID mg/dL 10.8* 11.4*     Results from last 7 days   Lab Units 08/04/21  0554 08/03/21  0506 08/02/21  0745 08/01/21  1544 07/31/21  0656 07/30/21  0510 07/30/21  0510   WBC 10*3/mm3 11.66* 11.76* 14.80* 13.33* 17.71*   < > 19.90*   HEMOGLOBIN g/dL 7.7* 7.4* 8.2* 7.8* 7.9*   < > 7.7*   PLATELETS 10*3/mm3 253 249 259 246  --   --  167    < > = values in this interval not displayed.     Results from last 7 days   Lab Units 08/01/21  1544   INR  1.19*     Brief Urine Lab Results  (Last result in the past 365 days)      Color   Clarity   Blood   Leuk Est   Nitrite   Protein   CREAT   Urine HCG        07/30/21 1018 Yellow Cloudy Small (1+) Trace Negative 100 mg/dL (2+)             No results found for: UTPCR  Imaging Results (Last 24 Hours)     ** No results found for the last 24 hours. **        apixaban, 5 mg, Oral, Q12H  aspirin, 81 mg, Oral, Daily  atorvastatin, 10 mg, Oral, Q PM  bisacodyl, 10 mg, Rectal, Daily  bisoprolol, 10 mg, Oral, Q24H  carboxymethylcellulose, 1 drop, Both Eyes, TID  clopidogrel, 75 mg, Oral, Daily  escitalopram, 10 mg, Oral, Daily  finasteride, 5 mg, Oral, Daily  hydrALAZINE, 10 mg, Oral,  Q8H  ipratropium-albuterol, 3 mL, Nebulization, Q6H - RT  isosorbide dinitrate, 10 mg, Oral, TID - Nitrates  lactobacillus acidophilus, 1 capsule, Oral, BID  mupirocin, , Topical, Q12H  pantoprazole, 40 mg, Oral, Daily  polyethylene glycol, 17 g, Oral, Daily  senna-docusate sodium, 1 tablet, Oral, BID  sodium chloride, 10 mL, Intravenous, Q12H  tamsulosin, 0.4 mg, Oral, Nightly      Pharmacy to dose vancomycin,           Medication Review:   Current Facility-Administered Medications   Medication Dose Route Frequency Provider Last Rate Last Admin   • acetaminophen (TYLENOL) tablet 650 mg  650 mg Oral Q4H PRN Ferdinand Greer MD   650 mg at 08/01/21 2128    Or   • acetaminophen (TYLENOL) 160 MG/5ML solution 650 mg  650 mg Oral Q4H PRN Ferdinand Greer MD        Or   • acetaminophen (TYLENOL) suppository 650 mg  650 mg Rectal Q4H PRN Ferdinand Greer MD       • ALPRAZolam (XANAX) tablet 0.25 mg  0.25 mg Oral BID PRN Ferdinand Greer MD   0.25 mg at 08/03/21 1249   • apixaban (ELIQUIS) tablet 5 mg  5 mg Oral Q12H Anna Piña APRN   5 mg at 08/04/21 0903   • aspirin chewable tablet 81 mg  81 mg Oral Daily Ferdinand Greer MD   81 mg at 08/04/21 0903   • atorvastatin (LIPITOR) tablet 10 mg  10 mg Oral Q PM Ferdinand Greer MD   10 mg at 08/03/21 1707   • bisacodyl (DULCOLAX) suppository 10 mg  10 mg Rectal Daily Ferdinand Greer MD   10 mg at 08/03/21 0817   • bisoprolol (ZEBeta) tablet 10 mg  10 mg Oral Q24H Jose Allen MD   10 mg at 08/04/21 0904   • carboxymethylcellulose (REFRESH PLUS) 0.5 % ophthalmic solution 1 drop  1 drop Both Eyes TID Ferdinand Greer MD   1 drop at 08/04/21 0902   • clopidogrel (PLAVIX) tablet 75 mg  75 mg Oral Daily Ferdinand Greer MD   75 mg at 08/04/21 0904   • escitalopram (LEXAPRO) tablet 10 mg  10 mg Oral Daily Ferdinand Greer MD   10 mg at 08/04/21 0903   • finasteride (PROSCAR) tablet 5 mg  5 mg Oral Daily Ferdinand Greer MD   5 mg at 08/04/21 0903   • hydrALAZINE (APRESOLINE) tablet 10 mg  10 mg Oral  Q8H Jose Allen MD   10 mg at 08/04/21 0629   • HYDROcodone-acetaminophen (NORCO) 5-325 MG per tablet 1 tablet  1 tablet Oral Q8H PRN Ferdinand Greer MD   1 tablet at 08/03/21 0528   • HYDROmorphone (DILAUDID) injection 0.5 mg  0.5 mg Intravenous Q2H PRN Nichole Spicer DO   0.5 mg at 08/04/21 0922   • ipratropium-albuterol (DUO-NEB) nebulizer solution 3 mL  3 mL Nebulization Q6H - RT Ferdinand Greer MD   3 mL at 08/04/21 0651   • ipratropium-albuterol (DUO-NEB) nebulizer solution 3 mL  3 mL Nebulization Q4H PRN Ferdinand Greer MD       • isosorbide dinitrate (ISORDIL) tablet 10 mg  10 mg Oral TID - Nitrates Jose Allen MD   10 mg at 08/04/21 0900   • lactobacillus acidophilus (RISAQUAD) capsule 1 capsule  1 capsule Oral BID Ferdinand Greer MD   1 capsule at 08/04/21 0904   • LORazepam (ATIVAN) injection 0.25 mg  0.25 mg Intravenous Q4H PRN Db Guillory DO       • mupirocin (BACTROBAN) 2 % ointment   Topical Q12H Ferdinand Greer MD   Given at 08/04/21 0902   • ondansetron (ZOFRAN) injection 4 mg  4 mg Intravenous Q6H PRN Ferdinand Greer MD   4 mg at 07/30/21 1940   • pantoprazole (PROTONIX) EC tablet 40 mg  40 mg Oral Daily Ferdinand Greer MD   40 mg at 08/04/21 0904   • Pharmacy to dose vancomycin   Does not apply Continuous PRN Anna Piña, APRN       • polyethylene glycol (MIRALAX) packet 17 g  17 g Oral Daily Ferdinand Greer MD   17 g at 08/03/21 0817   • sennosides-docusate (PERICOLACE) 8.6-50 MG per tablet 1 tablet  1 tablet Oral BID Ferdinand Greer MD   1 tablet at 08/03/21 2005   • sodium chloride 0.9 % flush 10 mL  10 mL Intravenous Q12H Ferdinand Greer MD   10 mL at 08/04/21 0903   • sodium chloride 0.9 % flush 10 mL  10 mL Intravenous PRN Ferdinand Greer MD   10 mL at 07/31/21 0520   • tamsulosin (FLOMAX) 24 hr capsule 0.4 mg  0.4 mg Oral Nightly Ferdinand Greer MD   0.4 mg at 08/03/21 2010   • vancomycin (VANCOCIN) IVPB 500 mg in 100 mL NS  500 mg Intravenous Daily PRN Anna Piña, APRN            Assessment/Plan:    1. Acute kidney injury: Most likely hemodynamically mediated secondary to cardiorespiratory issues and cardiorenal syndrome.  Continue to optimize medications.  Sodium and potassium is within normal limits and bicarb is slightly elevated at 32.  BUN of 88.  2. Coronary artery disease status post CABG:  3. Cardiomyopathy with EF of 25 to 30% status post AICD placement.  4. Anemia: We will check iron stores if hemoglobin drops any further will benefit from blood transfusion.  5. Acute respiratory failure with hypercapnia (CMS/[facility]): Pulmonary consultation has been placed.  6. Sepsis: Gram-positive cocci in clusters on initial blood cultures  7. Abdominal pain: Question of acute cholecystitis versus other abdominal issues, surgical evaluation is in progress, it was finally decided that there is no acute surgical abdomen.      Plan:  · Clinically he is pretty much status quo, he does not have any acute indications for dialysis at this point.  Chest x-ray looks slightly worse, he does respond to diuretics will go ahead and give him Lasix 80 mg every 4 hours x3 doses.  I will go ahead and do a 24-hour urine for protein and creatinine clearance if his clearance is significantly lower than EGFR will consider further discussions about dialysis.  · Details discussed with the hospitalist service, awaiting further decisions and discussions about inpatient hospice.  I still do not see any indications for dialysis so we will hold off until I have more detailed labs available we will continue to follow chest x-ray as well.  · Details were discussed with the patient.  · Continue with rest of the current treatment plan and surveillance labs.  · Further recommendations will depend on clinical course of the patient during the current hospitalization.    · I also discussed the details with the nursing staff.  · Rest as ordered.    Markel Owens MD, FASN  08/04/21  09:28 EDT      Dictated utilizing Dragon  dictation.

## 2021-08-04 NOTE — PLAN OF CARE
"Goal Outcome Evaluation:   Informed this am of DCP to [facility] to [facility] today.  Attempted to visit pt this am.  Pt was sleeping.  BiPAP on.   Pt appeared comfortable.  Contacted HCP to inform of result of COVID 19 test was \"Not Detected\".  Faxed over copy of BiPAP settings.   Confirmed EMS/DNR was on chart.    Received message pt was not wanting to go to the [facility] until family returned from their vacation.    Visited pt.  Pt awake and alert.  BiPAP on.  Pt had friend at bedside.  I explained I was with the PC team and had been informed he didn't want to go to the [facility] until his family was back from vacation.  I assured him, he would be well taken care of at the [facility].  His only comment to me at this time was that his bottom was really \"hurting\".  I offered to place a pillow behind his back to relieve the pressure and notify his primary nurse to see if she was aware and if there was cream to be applied.  Spoke with Chanel JUNIOR, pt's primary nurse.  She reported his bottom was reddened from pt having several BMs.  There was cream being applied.  I informed pt his nurse was aware of the redness and cream was being applied.    I resumed the conversation with pt.  I inquired if he wanted me to call his brother.  He said he did.    I contacted pt's brother, Jean Marie Pena [phone], who reported he was on vacation. I explained that his brother had spoken with Dr Garcia and decided to focus on comfort measures at this time.  Brother reported he was not aware of the decision but knew his brother had not been improving for awhile.   I mentioned pt was agreeable to going [facility] but did not want to go until family was back from vacation.  Jean Marie Pena reported he would be happy to help but would not be back until late Saturday.  I inquired about their sister.  He reported she \"has problems of her own with her  being sick\".  She lives in [place].  He did say he was going to call his brother.  "   Anna AGUILLON updated.      1400 Returned to speak with pt to inquire if brother had called.  Pt asleep having ECHO cardiogram done.  1430  Updated Dr Garcia pt didn't want to go to [facility] today due to family not being there.  She related she will follow up tomorrow.

## 2021-08-04 NOTE — PROGRESS NOTES
[facility]    Name:  Jani Pena   Age:  71 y.o.  Sex:  male  :  1950  MRN:  [id number]   Visit Number:  [id number]  Admission Date:  2021  Date Of Service:  21  Primary Care Physician:  Miguel Rojas MD     LOS: 7 days :    Chief Complaint:      Generalized weakness/ Abdominal pain      Subjective:    Patient seen and evaluated today.  Patient laying in bed with Bipap in place and stable saturations.  Appreciate nephrology following ARTIS on CKD.  No current indications for dialysis.  Tinajero catheter remains in place for strict I&Os.  States his breathing is ok, no pain in abdomen with exam.  Becomes very withdrawn when asked about potential discharge to [facility].  Patient then asks if someone can contact his family because he does not want to go there alone.  Palliative Care updated.    Hospital Course:    Mr. Pena is a 71-year-old gentleman with chronic comorbidities including prior CABG, diastolic congestive heart failure, CAD, severe COPD with chronic hypoxic/hypercapnic respiratory failure, chronic kidney disease stage III, who presented from assisted living facility due to abdominal pain and shortness of breath with cough.  Chest x-ray showed atelectasis.  Patient was given IV antibiotics therapy, IV Lasix and morphine in the emergency room.  CT scan abdomen and pelvis without contrast with findings concerning for acute cholecystitis.  Patient was also noted to have worsening creatinine level from his baseline.     Patient was admitted to the medical floor and was placed on IV antibiotics therapy with Zosyn.  Dr. Donovan from nephrology was consulted and he recommended IV diuretic therapy with Bumex.  He was seen by Dr. Mcdonald from surgical services who did not feel that the patient had clinical evidence of cholecystitis and recommended HIDA scan.  He also felt that the patient would be high surgical risk candidate for any open gallbladder  surgery.     Both of the patient's blood cultures done in the emergency room came back positive for MRSA and he was placed on vancomycin.  Patient had a HIDA scan and it showed normal filling of the gallbladder not suggestive of cholecystitis.  Patient was seen by Dr. Maldonado and he recommended no further interventions for his gallbladder at this time.  Patient was noted to have nonsustained ventricular tachycardia on telemetry and Dr. Allen from cardiology was consulted.  Started on Eliquis 5mg BID.  Continued on Vancomycin as cultures have not cleared.  Echo pending.  Recheck Blood Cultures today.  Palliative is following, although, patient does not wish to go to [facility] without family present.  They are all currently out of town or unavailable.    Review of Systems:     All systems were reviewed and negative except as mentioned in subjective, assessment and plan.    Vital Signs:    Temp:  [97.5 °F (36.4 °C)-98.4 °F (36.9 °C)] 98.3 °F (36.8 °C)  Heart Rate:  [63-76] 64  Resp:  [15-24] 18  BP: (126-138)/(53-62) 133/54    Intake and output:    I/O last 3 completed shifts:  In: 2151 [I.V.:2151]  Out: 450 [Urine:450]  No intake/output data recorded.    Physical Examination:    General Appearance:  Alert and cooperative, chronically ill male, anxious in conversation today, flat affect   Head:  Atraumatic and normocephalic.   Eyes: Conjunctivae and sclerae normal, no icterus. No pallor.   Throat: No oral lesions, no thrush, oral mucosa moist.   Neck: Supple, trachea midline.   Lungs:   Breath sounds heard bilaterally equally but diminished.  No crackles or wheezing. On Bipap during exam   Heart:  Normal S1 and S2, no murmur, no gallop, no rub. No JVD, pacemaker to left chest wall.   Abdomen:   Normal bowel sounds, no masses, no organomegaly. Soft, nontender, nondistended, no rebound tenderness.   Extremities: Supple, no edema, no cyanosis, no clubbing.   Skin: No bleeding or rash.   Neurologic: Alert and oriented x 3. No  facial asymmetry. Moves all four limbs.       Laboratory results:    Results from last 7 days   Lab Units 08/04/21  0554 08/03/21  0506 08/02/21  0745   SODIUM mmol/L 143 139 139   POTASSIUM mmol/L 3.6 3.6 3.8   CHLORIDE mmol/L 96* 93* 90*   CO2 mmol/L 32.7* 33.2* 35.9*   BUN mg/dL 88* 95* 90*   CREATININE mg/dL 3.27* 3.85* 3.16*   CALCIUM mg/dL 8.5* 8.2* 8.6   BILIRUBIN mg/dL 0.6 0.6 0.7   ALK PHOS U/L 86 85 96   ALT (SGPT) U/L 15 10 9   AST (SGOT) U/L 44* 27 20   GLUCOSE mg/dL 75 94 110*     Results from last 7 days   Lab Units 08/04/21  0554 08/03/21  0506 08/02/21  0745   WBC 10*3/mm3 11.66* 11.76* 14.80*   HEMOGLOBIN g/dL 7.7* 7.4* 8.2*   HEMATOCRIT % 26.3* 24.6* 27.3*   PLATELETS 10*3/mm3 253 249 259     Results from last 7 days   Lab Units 08/01/21  1544   INR  1.19*     Results from last 7 days   Lab Units 07/31/21  0205 07/28/21  1502   TROPONIN T ng/mL 0.065* 0.077*     Results from last 7 days   Lab Units 08/02/21  0745 08/01/21  2310 07/30/21  0000 07/28/21  1854   BLOODCX  Growth present, too young to evaluate Staphylococcus aureus, MRSA*  --  Staphylococcus aureus, MRSA*  Staphylococcus aureus, MRSA*   URINECX   --   --  No growth  --      Results from last 7 days   Lab Units 07/28/21  1511   PH, ARTERIAL pH units 7.447   PO2 ART mm Hg 178.0*   PCO2, ARTERIAL mm Hg 59.0*   HCO3 ART mmol/L 40.6*     I have reviewed the patient's laboratory results.    Radiology results:    XR Chest 1 View    Result Date: 8/3/2021  PROCEDURE: XR CHEST 1 VW-  HISTORY: Left lower back pain; J96.02-Acute respiratory failure with hypercapnia; J18.9-Pneumonia, unspecified organism; F19-Fapflayiac condition; N17.9-Acute kidney failure, unspecified; I50.9-Heart failure, unspecified  COMPARISON: 07/28/2021.  FINDINGS: A left subclavian pacemaker is in place. The heart is enlarged. The mediastinum is unremarkable. There is interstitial pulmonary edema with small effusions. There is no pneumothorax.  There are no acute osseous  abnormalities.      Impression: Cardiomegaly with interstitial edema and small effusions.  Continued followup is recommended.  This report was finalized on 8/3/2021 12:07 PM by Richelle Patel M.D..    I have reviewed the patient's radiology reports.    Medication Review:     I have reviewed the patient's active and prn medications.     Problem List:      Pneumonia of both lower lobes due to methicillin resistant Staphylococcus aureus (MRSA) (CMS/[facility])    Acute respiratory failure with hypercapnia (CMS/[facility])    Calculus of gallbladder without cholecystitis without obstruction    MRSA bacteremia      Assessment:    1. Acute on chronic respiratory failure with hypoxia and hypercapnia, POA.  2. Sepsis with MRSA bacteremia likely secondary to #3, POA.  3. Bilateral bacterial pneumonia, likely MRSA, POA.  4. Suspected cholecystitis, POA.  5. Acute renal failure on chronic kidney disease stage III, POA.  6. Acute on chronic systolic heart failure with ejection fraction of 25%  7. Coronary artery disease status post CABG.  8. Ischemic cardiomyopathy.  9. Sick sinus syndrome status post pacemaker.  10. Anemia of chronic kidney disease    Plan:    Vancomycin extended out to 7 day course.  Recheck blood cultures with KATE yesterday with gram + cocci growing.   Initial blood cultures grew MRSA as did nasal swab as have recheck surveillance cultures.  Blood cultures ordered x2 today with KATE to assess for clearance.  Echo ordered to assess for vegetation.  Continue IV Vancomycin for now, extended out another 2 days awaiting culture clearance to start PO antibiotics.     Repeat CXR today for left sided back pain noted cardiomegaly with interstitial edema and small effusions.  ARTIS being followed by Nephrology with creatinine worsened today at 3.85. 24 hour urine clearance ordered for today.  Continue strict I&Os.        Suspected cholecystitis does not require surgical intervention at this time per Dr. Maldonado notes and patient  received IV Zosyn for 5 days.   Patient currently with pacemaker in place with recommendations for AICD and Life Vest until further evaluation by primary cardiologist (Dr. Bass) per Dr. Allen notes.  Discussed with Dr. Bass who advised that ICD placement and/ or Life Vest placement would not be considered due to dismal pulmonary status. Heparin drip has been stopped, Eliquis 5mg BID started per Cardiology recommendation.       Palliative team has been consulted and is following for possible hospice discussions. They have been in contact with patient's daughter and sister.  Patient advised today that he did not want to go to the [facility] alone and would like to know when his family can be there.  Spoke with Michaela (Palliative nurse) who is contacting family to see when this is possible.  Continue to monitor closely as patient's condition is guarded.  Discharge plan pending.    DVT Prophylaxis: Heparin  Code Status: DNR  Diet: N.p.o. except for ice chips.  Discharge Plan: Pending    Anna Piña, APRN  08/04/21  13:15 EDT    Dictated utilizing Dragon dictation.

## 2021-08-04 NOTE — PLAN OF CARE
Goal Outcome Evaluation:  Plan of Care Reviewed With: patient        Progress: improving  Outcome Summary: Patient seems to be very worried about his condition. He remained on bipap all night and saturation remained good. Vitals have been stable and will continue to monitor. no overnight events to report. possible discharge to [facility] on Wednesday

## 2021-08-04 NOTE — PHARMACY RECOMMENDATION
"Pharmacokinetic Follow-up Note - Vancomycin     Pharmacy was consulted to dose vancomycin for  Jani Pena, a 71 y.o. male  172.7 cm (68\") 79.7 kg (175 lb 11.3 oz)     Indication for use: sepsis secondary to pneumonia and MRSA bacteremia    Results from last 7 days   Lab Units 08/04/21  0554 08/03/21  0506 08/02/21  0745   WBC 10*3/mm3 11.66* 11.76* 14.80*   CREATININE mg/dL 3.27* 3.85* 3.16*      Estimated Creatinine Clearance: 23.4 mL/min (A) (by C-G formula based on SCr of 3.27 mg/dL (H)).  Temp Readings from Last 1 Encounters:   08/04/21 98.3 °F (36.8 °C) (Temporal)     Lab Results   Component Value Date    VANCOTROUGH 21.50 (H) 07/29/2021    VANCORANDOM 22.90 08/04/2021       Microbiology:  Microbiology Results (last 10 days)       Procedure Component Value - Date/Time    COVID-19,[facility] IN-HOUSE,Nasal Swab No Transport Media 3-4 HR TAT - Swab, Nasal Cavity [[id number]]  (Normal) Collected: 08/03/21 1738    Lab Status: Final result Specimen: Swab from Nasal Cavity Updated: 08/03/21 1833     COVID19 Not Detected    Narrative:      Fact sheet for providers: https://www.fda.gov/media/537624/download     Fact sheet for patients: https://www.fda.gov/media/435637/download    Test performed by PCR.    Consider negative results in combination with clinical observations, patient history, and epidemiological information.    Blood Culture With KATE - Blood, Arm, Left [[id number]]  (Abnormal) Collected: 08/02/21 0745    Lab Status: Preliminary result Specimen: Blood from Arm, Left Updated: 08/03/21 1252     Blood Culture Growth present, too young to evaluate     Gram Stain Aerobic Bottle Gram negative cocci      Anaerobic Bottle Gram positive cocci in clusters    Blood Culture With KATE - Blood, Arm, Left [[id number]]  (Abnormal) Collected: 08/01/21 2310    Lab Status: Final result Specimen: Blood from Arm, Left Updated: 08/03/21 1143     Blood Culture Staphylococcus aureus, MRSA     Comment: Methicillin resistant " Staphylococcus aureus, Patient may be an isolation risk.        Isolated from Pediatric Bottle     Gram Stain Pediatric Bottle Gram positive cocci in clusters    Narrative:      Refer to previous blood culture collected on 07/28/21 at 18:54 for MICs.      Urine Culture - Urine, Urine, Clean Catch [[id number]]  (Normal) Collected: 07/30/21 0000    Lab Status: Final result Specimen: Urine, Clean Catch Updated: 08/01/21 1718     Urine Culture No growth    MRSA Screen, PCR (Inpatient) - Swab, Nares [[id number]]  (Abnormal) Collected: 07/29/21 0207    Lab Status: Final result Specimen: Swab from Nares Updated: 07/29/21 1348     MRSA PCR MRSA Detected    Respiratory Culture - Sputum, Cough [[id number]] Collected: 07/28/21 1953    Lab Status: Final result Specimen: Sputum from Cough Updated: 07/28/21 2035     Respiratory Culture Rejected    Narrative:      Specimen rejected due to microscopic exam of gram stain.    Respiratory Panel, PCR (WITHOUT COVID) - Swab, Nasopharynx [[id number]]  (Normal) Collected: 07/28/21 1953    Lab Status: Final result Specimen: Swab from Nasopharynx Updated: 07/28/21 2210     ADENOVIRUS, PCR Not Detected     Coronavirus 229E Not Detected     Coronavirus HKU1 Not Detected     Coronavirus NL63 Not Detected     Coronavirus OC43 Not Detected     Human Metapneumovirus Not Detected     Human Rhinovirus/Enterovirus Not Detected     Influenza B PCR Not Detected     Parainfluenza Virus 1 Not Detected     Parainfluenza Virus 2 Not Detected     Parainfluenza Virus 3 Not Detected     Parainfluenza Virus 4 Not Detected     Bordetella pertussis pcr Not Detected     Influenza A H1 2009 PCR Not Detected     Chlamydophila pneumoniae PCR Not Detected     Mycoplasma pneumo by PCR Not Detected     Influenza A PCR Not Detected     Influenza A H3 Not Detected     Influenza A H1 Not Detected     RSV, PCR Not Detected     Bordetella parapertussis PCR Not Detected    Narrative:      The coronavirus on the RVP is NOT  COVID-19 and is NOT indicative of infection with COVID-19.    In the setting of a positive respiratory panel with a viral infection PLUS a negative procalcitonin without other underlying concern for bacterial infection, consider observing off antibiotics or discontinuation of antibiotics and continue supportive care. If the respiratory panel is positive for atypical bacterial infection (Bordetella pertussis, Chlamydophila pneumoniae, or Mycoplasma pneumoniae), consider antibiotic de-escalation to target atypical bacterial infection.    Blood Culture - Blood, Arm, Left [[id number]]  (Abnormal) Collected: 07/28/21 1854    Lab Status: Final result Specimen: Blood from Arm, Left Updated: 07/31/21 0928     Blood Culture Staphylococcus aureus, MRSA     Comment: Infectious disease consultation is highly recommended to rule out distant foci of infection.  Methicillin resistant Staphylococcus aureus, Patient may be an isolation risk.        Isolated from Pediatric Bottle     Gram Stain Pediatric Bottle Gram positive cocci in clusters    Narrative:      Refer to previous blood culture collected on 7/28 for KEVIN's      Blood Culture - Blood, Arm, Left [[id number]]  (Abnormal)  (Susceptibility) Collected: 07/28/21 1854    Lab Status: Final result Specimen: Blood from Arm, Left Updated: 07/31/21 0927     Blood Culture Staphylococcus aureus, MRSA     Comment: Infectious disease consultation is highly recommended to rule out distant foci of infection.  Methicillin resistant Staphylococcus aureus, Patient may be an isolation risk.        Isolated from Pediatric Bottle     Gram Stain Pediatric Bottle Gram positive cocci in clusters    Susceptibility        Staphylococcus aureus, MRSA      KEVIN      Gentamicin Susceptible      Oxacillin Resistant      Rifampin Susceptible      Vancomycin Susceptible                 Linear View                       Blood Culture ID, PCR - Blood, Arm, Left [[id number]] Collected: 07/28/21 1854    Lab  Status: Final result Specimen: Blood from Arm, Left Updated: 07/29/21 0642    Narrative:      Staphylococcus aureus MRSA Detected.     COVID-19,[facility] IN-HOUSE,Nasal Swab No Transport Media 3-4 HR TAT - Swab, Nasal Cavity [[id number]]  (Normal) Collected: 07/28/21 1845    Lab Status: Final result Specimen: Swab from Nasal Cavity Updated: 07/28/21 1921     COVID19 Not Detected    Narrative:      Fact sheet for providers: https://www.fda.gov/media/661681/download     Fact sheet for patients: https://www.fda.gov/media/706982/download    Test performed by PCR.    Consider negative results in combination with clinical observations, patient history, and epidemiological information.    Urine Culture - Urine, Urine, Clean Catch [[id number]] Collected: 07/26/21 1019    Lab Status: Final result Specimen: Urine, Clean Catch Updated: 07/27/21 1215     Urine Culture 50,000 CFU/mL Mixed Tita Isolated    Narrative:      Specimen contains mixed organisms of questionable pathogenicity which indicates contamination with commensal tita.  Further identification is unlikely to provide clinically useful information.  Suggest recollection.            Current Vancomycin Dose:  dosing per levels     Assessment/Plan:  Vancomycin level is 22.9 mcg/ml today. Will hold vancomycin today and reassess in the morning with another level. Pharmacy will continue to monitor renal function and adjust dose accordingly.    Natasha Alcala PharmD, BCPS  08/04/21 13:27 EDT

## 2021-08-05 NOTE — PLAN OF CARE
Goal Outcome Evaluation:         Patient tolerated blood transfusion well. Frequently seeks out staff. Maintained O2 saturations between 92-99 on 6L hi-flow NC.

## 2021-08-05 NOTE — PROGRESS NOTES
"Nephrology Progress Note.      LOS: 8 days    Patient Care Team:  Miguel Rojas MD as PCP - General (Internal Medicine)  Ulysses Bass MD as Cardiologist (Cardiology)    Chief Complaint:    Chief Complaint   Patient presents with   • Shortness of Breath       Subjective:   Follow up for ARTIS and Chronic Kidney disease stage 3    Interval History:   Patient Complaints: none  Patient seen and examined this morning.  Events from last 24 hours noted.  Patient has a BiPAP on, he is awake alert and interactive.  Denies having any fevers chills.  No nausea or vomiting no abdominal pain.  Denies any chest pain, shortness of breath, he is complaining of episodic cough and sputum production.  There is no significant edema.   Patient also denies having new onset weakness of numbness of either extremity.  Ongoing discussions about inpatient hospice.  History taken from: patient    Objective:    Vital Signs  /67 (BP Location: Right arm, Patient Position: Lying)   Pulse 69   Temp 98.4 °F (36.9 °C) (Temporal)   Resp 20   Ht 172.7 cm (68\")   Wt 79.7 kg (175 lb 11.3 oz)   SpO2 98%   BMI 26.72 kg/m²     No intake/output data recorded.    Intake/Output Summary (Last 24 hours) at 8/5/2021 0803  Last data filed at 8/5/2021 0300  Gross per 24 hour   Intake 635 ml   Output 2750 ml   Net -2115 ml       Physical Exam:  General Appearance: alert, oriented x 3, no acute distress,   HEENT: Oral mucosa dry, extra occular movements intact. Sclera clear.  Skin: Warm and dry  Neck: supple, no JVD, trachea midline  Lungs:Chest shape is normal. Breath sounds heard bilaterally equally.  Coarse breath sounds all over the chest.  Heart: Irregular rate and rhythm. normal S1 and S2, no S3, no rub, peripheral pulses weak but palpable.  Abdomen: Obese, soft, non-tender,  present bowel sounds to auscultation.  Positive fluid thrill  : no palpable bladder.  Extremities: Trace edema, no cyanosis or clubbing.   Neuro: normal speech and mental " status, grossly non focal.     Results Review:   Results from last 7 days   Lab Units 08/05/21  0557 08/04/21  0554 08/03/21  0506   SODIUM mmol/L 145 143 139   POTASSIUM mmol/L 3.6 3.6 3.6   CHLORIDE mmol/L 94* 96* 93*   CO2 mmol/L 31.9* 32.7* 33.2*   BUN mg/dL 80* 88* 95*   CREATININE mg/dL 2.45* 3.27* 3.85*   CALCIUM mg/dL 8.7 8.5* 8.2*   ALBUMIN g/dL 3.00* 3.10* 3.00*   BILIRUBIN mg/dL 0.6 0.6 0.6   ALK PHOS U/L 83 86 85   ALT (SGPT) U/L 14 15 10   AST (SGOT) U/L 37 44* 27   GLUCOSE mg/dL 68 75 94     Estimated Creatinine Clearance: 31.2 mL/min (A) (by C-G formula based on SCr of 2.45 mg/dL (H)).  Results from last 7 days   Lab Units 08/02/21  0745 08/01/21  0558 07/31/21  0656   MAGNESIUM mg/dL 3.0* 3.0*  --    PHOSPHORUS mg/dL 4.7* 6.2* 5.8*     Results from last 7 days   Lab Units 08/02/21  0745 08/01/21  0558   URIC ACID mg/dL 10.8* 11.4*     Results from last 7 days   Lab Units 08/05/21  0557 08/04/21  0554 08/03/21  0506 08/02/21  0745 08/01/21  1544   WBC 10*3/mm3 10.35 11.66* 11.76* 14.80* 13.33*   HEMOGLOBIN g/dL 7.8* 7.7* 7.4* 8.2* 7.8*   PLATELETS 10*3/mm3 218 253 249 259 246     Results from last 7 days   Lab Units 08/01/21  1544   INR  1.19*     Brief Urine Lab Results  (Last result in the past 365 days)      Color   Clarity   Blood   Leuk Est   Nitrite   Protein   CREAT   Urine HCG        07/30/21 1018 Yellow Cloudy Small (1+) Trace Negative 100 mg/dL (2+)             No results found for: UTPCR  Imaging Results (Last 24 Hours)     ** No results found for the last 24 hours. **        apixaban, 5 mg, Oral, Q12H  aspirin, 81 mg, Oral, Daily  atorvastatin, 10 mg, Oral, Q PM  bisacodyl, 10 mg, Rectal, Daily  bisoprolol, 10 mg, Oral, Q24H  carboxymethylcellulose, 1 drop, Both Eyes, TID  clopidogrel, 75 mg, Oral, Daily  escitalopram, 10 mg, Oral, Daily  finasteride, 5 mg, Oral, Daily  hydrALAZINE, 10 mg, Oral, Q8H  ipratropium-albuterol, 3 mL, Nebulization, Q6H - RT  isosorbide dinitrate, 10 mg, Oral,  TID - Nitrates  lactobacillus acidophilus, 1 capsule, Oral, BID  mupirocin, , Topical, Q12H  pantoprazole, 40 mg, Oral, Daily  polyethylene glycol, 17 g, Oral, Daily  senna-docusate sodium, 1 tablet, Oral, BID  sodium chloride, 10 mL, Intravenous, Q12H  tamsulosin, 0.4 mg, Oral, Nightly      Pharmacy to dose vancomycin,           Medication Review:   Current Facility-Administered Medications   Medication Dose Route Frequency Provider Last Rate Last Admin   • acetaminophen (TYLENOL) tablet 650 mg  650 mg Oral Q4H PRN Ferdinand Greer MD   650 mg at 08/01/21 2128    Or   • acetaminophen (TYLENOL) 160 MG/5ML solution 650 mg  650 mg Oral Q4H PRN Ferdinand Greer MD        Or   • acetaminophen (TYLENOL) suppository 650 mg  650 mg Rectal Q4H PRN Ferdinand Greer MD       • ALPRAZolam (XANAX) tablet 0.25 mg  0.25 mg Oral BID PRN Ferdinand Greer MD   0.25 mg at 08/04/21 1111   • apixaban (ELIQUIS) tablet 5 mg  5 mg Oral Q12H Anna Piña APRN   5 mg at 08/04/21 2159   • aspirin chewable tablet 81 mg  81 mg Oral Daily Ferdinand Greer MD   81 mg at 08/04/21 0903   • atorvastatin (LIPITOR) tablet 10 mg  10 mg Oral Q PM Ferdinand Greer MD   10 mg at 08/04/21 1742   • bisacodyl (DULCOLAX) suppository 10 mg  10 mg Rectal Daily Ferdinand Greer MD   10 mg at 08/03/21 0817   • bisoprolol (ZEBeta) tablet 10 mg  10 mg Oral Q24H Jose Allen MD   10 mg at 08/04/21 0904   • carboxymethylcellulose (REFRESH PLUS) 0.5 % ophthalmic solution 1 drop  1 drop Both Eyes TID Ferdinand Greer MD   1 drop at 08/04/21 2100   • clopidogrel (PLAVIX) tablet 75 mg  75 mg Oral Daily Ferdinand Greer MD   75 mg at 08/04/21 0904   • escitalopram (LEXAPRO) tablet 10 mg  10 mg Oral Daily Ferdinand Greer MD   10 mg at 08/04/21 0903   • finasteride (PROSCAR) tablet 5 mg  5 mg Oral Daily Ferdinand Greer MD   5 mg at 08/04/21 0903   • hydrALAZINE (APRESOLINE) tablet 10 mg  10 mg Oral Q8H Jose Allen MD   10 mg at 08/05/21 0615   • HYDROcodone-acetaminophen (NORCO) 5-325  MG per tablet 1 tablet  1 tablet Oral Q8H PRN Ferdinand Greer MD   1 tablet at 08/03/21 0528   • HYDROmorphone (DILAUDID) injection 0.5 mg  0.5 mg Intravenous Q2H PRN Nichole Spicer DO   0.5 mg at 08/05/21 0433   • ipratropium-albuterol (DUO-NEB) nebulizer solution 3 mL  3 mL Nebulization Q6H - RT Ferdinand Greer MD   3 mL at 08/05/21 0710   • ipratropium-albuterol (DUO-NEB) nebulizer solution 3 mL  3 mL Nebulization Q4H PRN Ferdinand Greer MD       • isosorbide dinitrate (ISORDIL) tablet 10 mg  10 mg Oral TID - Nitrates Jose Allen MD   10 mg at 08/04/21 1742   • lactobacillus acidophilus (RISAQUAD) capsule 1 capsule  1 capsule Oral BID Ferdinand Greer MD   1 capsule at 08/04/21 2159   • LORazepam (ATIVAN) injection 0.25 mg  0.25 mg Intravenous Q4H PRN Db Guillory DO       • mupirocin (BACTROBAN) 2 % ointment   Topical Q12H Ferdinand Greer MD   Given at 08/04/21 2100   • ondansetron (ZOFRAN) injection 4 mg  4 mg Intravenous Q6H PRN Ferdinand Greer MD   4 mg at 07/30/21 1940   • pantoprazole (PROTONIX) EC tablet 40 mg  40 mg Oral Daily Ferdinand Greer MD   40 mg at 08/04/21 0904   • Pharmacy to dose vancomycin   Does not apply Continuous PRN Anna Piña, APRKASH       • polyethylene glycol (MIRALAX) packet 17 g  17 g Oral Daily Ferdinand rGeer MD   17 g at 08/03/21 0817   • sennosides-docusate (PERICOLACE) 8.6-50 MG per tablet 1 tablet  1 tablet Oral BID Ferdinand Greer MD   1 tablet at 08/04/21 2100   • sodium chloride 0.9 % flush 10 mL  10 mL Intravenous Q12H Ferdinand Greer MD   10 mL at 08/04/21 2100   • sodium chloride 0.9 % flush 10 mL  10 mL Intravenous PRN Ferdinand Greer MD   10 mL at 07/31/21 0520   • tamsulosin (FLOMAX) 24 hr capsule 0.4 mg  0.4 mg Oral Nightly Ferdinand Greer MD   0.4 mg at 08/04/21 6743   • vancomycin (VANCOCIN) IVPB 500 mg in 100 mL NS  500 mg Intravenous Daily PRN Anna Piña, APRN           Assessment/Plan:    1. Acute kidney injury: Most likely hemodynamically mediated secondary to  cardiorespiratory issues and cardiorenal syndrome.  Continue to optimize medications.      2. Coronary artery disease status post CABG:  3. Cardiomyopathy with EF of 25 to 30% status post AICD placement.  4. Anemia: We will check iron stores if hemoglobin drops any further will benefit from blood transfusion.  5. Acute respiratory failure with hypercapnia (CMS/[facility]): Pulmonary consultation has been placed.  6. Sepsis: Gram-positive cocci in clusters on initial blood cultures  7. Abdominal pain: Question of acute cholecystitis versus other abdominal issues, surgical evaluation is in progress, it was finally decided that there is no acute surgical abdomen.      Plan:  · Clinically he appears to be doing fine, since diuresis yesterday renal function is improved.  I doubt that he will have any indications for dialysis at this point.  · I think he has significant amount of dementia, this morning he was talking about that his mother was in the room and he is planning with her what to do next.  · Details discussed with the hospitalist service, awaiting further decisions and discussions about inpatient hospice.  I do not think dialysis will be an issue at this point, there is decisions about going to nursing home/rehab versus hospice will be all up to patient and his family.  · I think he will benefit from a blood transfusion we will go ahead and give 1 unit of blood.  His iron stores are low we will start him on Niferex 1 tablet daily.  · I think he will benefit from diuretics will start him on torsemide 100 mg once a day.  It may need to be adjusted after few days may decrease it to 50 mg daily.  · Details were discussed with the patient, as well as the hospitalist service.  · Continue with rest of the current treatment plan and surveillance labs.  · Further recommendations will depend on clinical course of the patient during the current hospitalization.    · I also discussed the details with the nursing staff.  · Rest as  ordered.    Markel Owens MD, FASN  08/05/21  08:03 EDT      Dictated utilizing Dragon dictation.

## 2021-08-05 NOTE — PLAN OF CARE
Dr. Garcia met with pt and his sister in pt's room.  Pt voiced his agreement for going to the [facility], however, he would like to wait until Saturday when his daughter is back from vacation.  Pt denied any concerns at this time.  Palliative services will continue to follow.    EMS/DNR is on pt's chart.    Report number is [phone].  Discharge summary can be faxed to [phone].

## 2021-08-05 NOTE — PROGRESS NOTES
[facility]    Name:  Jani Pena   Age:  71 y.o.  Sex:  male  :  1950  MRN:  [id number]   Visit Number:  [id number]  Admission Date:  2021  Date Of Service:  21  Primary Care Physician:  Miguel Rojas MD     LOS: 8 days :    Chief Complaint:      Generalized weakness/ Abdominal pain       Subjective:    Patient seen and evaluated today.  Patient's mom and sister are at bedside.  Patient is resting on 5L nasal cannula with oxygen saturations at 93% today.  Patient states his breathing is better today.  Patient is being followed by nephrology for his ARTIS which is improved today.  Denies any problems today, but becomes very withdrawn when asked about discharge plans.  I advised that we were treating everything that was wrong with him, but we needed to start thinking about a discharge plan so that case management could begin working on it.  I did ask about [facility] and he stated he wanted to talk to his family about it.  I advised that we needed to begin these discussions and that Dr. Garcia was planning on following up with him today.      Hospital Course:    Mr. Pena is a 71-year-old gentleman with chronic comorbidities including prior CABG, diastolic congestive heart failure, CAD, severe COPD with chronic hypoxic/hypercapnic respiratory failure, chronic kidney disease stage III, who presented from assisted living facility due to abdominal pain and shortness of breath with cough.  Chest x-ray showed atelectasis.  Patient was given IV antibiotics therapy, IV Lasix and morphine in the emergency room.  CT scan abdomen and pelvis without contrast with findings concerning for acute cholecystitis.  Patient was also noted to have worsening creatinine level from his baseline.     Patient was admitted to the medical floor and was placed on IV antibiotics therapy with Zosyn.  Dr. Donovan from nephrology was consulted and he recommended IV diuretic therapy  with Bumex.  He was seen by Dr. Mcdonald from surgical services who did not feel that the patient had clinical evidence of cholecystitis and recommended HIDA scan.  He also felt that the patient would be high surgical risk candidate for any open gallbladder surgery.     Both of the patient's blood cultures done in the emergency room came back positive for MRSA and he was placed on vancomycin.  Patient had a HIDA scan and it showed normal filling of the gallbladder not suggestive of cholecystitis.  Patient was seen by Dr. Maldonado and he recommended no further interventions for his gallbladder at this time.  Patient was noted to have nonsustained ventricular tachycardia on telemetry and Dr. Allen from cardiology was consulted.  Started on Eliquis 5mg BID.  Continued on Vancomycin as cultures have not cleared.  Echo resulted and noted tricuspid endocarditis per Dr. Lam.  Recheck Blood Cultures today.  Palliative is following for potential [facility] placement, although, patient does not wish to go to [facility] without family present.  They are all currently out of town or unavailable.    Review of Systems:     All systems were reviewed and negative except as mentioned in subjective, assessment and plan.    Vital Signs:    Temp:  [97.3 °F (36.3 °C)-98.4 °F (36.9 °C)] 97.3 °F (36.3 °C)  Heart Rate:  [58-71] 67  Resp:  [16-26] 20  BP: (130-156)/(52-67) 156/64    Intake and output:    I/O last 3 completed shifts:  In: 635 [P.O.:35; Other:600]  Out: 2750 [Urine:2750]  I/O this shift:  In: 60 [P.O.:60]  Out: -     Physical Examination:    General Appearance:  Alert and cooperative, resting in bed with family at bedside, no acute distress noted, chronically ill male    Head:  Atraumatic and normocephalic.   Eyes: Conjunctivae and sclerae normal, no icterus. No pallor.   Throat: No oral lesions, no thrush, oral mucosa moist.   Neck: Supple, trachea midline   Lungs:   Breath sounds heard bilaterally equally but diminished throughout.  No  crackles or wheezing.    Heart:  Normal S1 and S2, no murmur, no gallop, no rub. No JVD.   Abdomen:   Normal bowel sounds, no masses, no organomegaly. Soft, nontender, nondistended, no rebound tenderness.   Extremities: Supple, no edema, no cyanosis, no clubbing.   Skin: No bleeding or rash.   Neurologic: Alert and oriented x 3. No facial asymmetry. Moves all four limbs. No tremors.      Laboratory results:    Results from last 7 days   Lab Units 08/05/21  0557 08/04/21  0554 08/03/21  0506   SODIUM mmol/L 145 143 139   POTASSIUM mmol/L 3.6 3.6 3.6   CHLORIDE mmol/L 94* 96* 93*   CO2 mmol/L 31.9* 32.7* 33.2*   BUN mg/dL 80* 88* 95*   CREATININE mg/dL 2.45* 3.27* 3.85*   CALCIUM mg/dL 8.7 8.5* 8.2*   BILIRUBIN mg/dL 0.6 0.6 0.6   ALK PHOS U/L 83 86 85   ALT (SGPT) U/L 14 15 10   AST (SGOT) U/L 37 44* 27   GLUCOSE mg/dL 68 75 94     Results from last 7 days   Lab Units 08/05/21  0557 08/04/21  0554 08/03/21  0506   WBC 10*3/mm3 10.35 11.66* 11.76*   HEMOGLOBIN g/dL 7.8* 7.7* 7.4*   HEMATOCRIT % 26.3* 26.3* 24.6*   PLATELETS 10*3/mm3 218 253 249     Results from last 7 days   Lab Units 08/01/21  1544   INR  1.19*     Results from last 7 days   Lab Units 07/31/21  0205   TROPONIN T ng/mL 0.065*     Results from last 7 days   Lab Units 08/04/21  1420 08/04/21  1414 08/02/21  0745 08/01/21  2310 07/30/21  0000   BLOODCX  Abnormal Stain* Abnormal Stain* Staphylococcus aureus, MRSA* Staphylococcus aureus, MRSA*  --    URINECX   --   --   --   --  No growth         I have reviewed the patient's laboratory results.    Radiology results:    XR Chest 1 View    Result Date: 8/3/2021  PROCEDURE: XR CHEST 1 VW-  HISTORY: Left lower back pain; J96.02-Acute respiratory failure with hypercapnia; J18.9-Pneumonia, unspecified organism; X28-Iqqxfrodcu condition; N17.9-Acute kidney failure, unspecified; I50.9-Heart failure, unspecified  COMPARISON: 07/28/2021.  FINDINGS: A left subclavian pacemaker is in place. The heart is enlarged. The  mediastinum is unremarkable. There is interstitial pulmonary edema with small effusions. There is no pneumothorax.  There are no acute osseous abnormalities.      Impression: Cardiomegaly with interstitial edema and small effusions.  Continued followup is recommended.  This report was finalized on 8/3/2021 12:07 PM by Richelle Patel M.D..    I have reviewed the patient's radiology reports.    Medication Review:     I have reviewed the patient's active and prn medications.     Problem List:      Pneumonia of both lower lobes due to methicillin resistant Staphylococcus aureus (MRSA) (CMS/[facility])    Acute respiratory failure with hypercapnia (CMS/[facility])    Calculus of gallbladder without cholecystitis without obstruction    MRSA bacteremia      Assessment:      Pneumonia of both lower lobes due to methicillin resistant Staphylococcus   aureus     Acute respiratory failure with hypercapnia     Calculus of gallbladder without cholecystitis without obstruction    MRSA bacteremia    Plan:    Vancomycin extended out to 7 day course.  Recheck blood cultures with KATE yesterday with gram + cocci growing.   Initial blood cultures grew MRSA as did nasal swab as have recheck surveillance cultures.  Blood cultures ordered x2 today with KATE to assess for clearance.  Echo ordered to assess for vegetation and noted tricuspid endocarditis.  Continue IV Vancomycin.  Dr. Lam recommended transfer to [facility] for EP Cards and ID evaluation if patient would like to aggressively treat.     Repeat CXR  for left sided back pain noted cardiomegaly with interstitial edema and small effusions.  ARTIS being followed by Nephrology with creatinine significantly improved after diuresis yesterday to 2.45 . Continue strict I&Os with lee catheter in place.  Dr. Owens has ordered 1 unit of PRBCs for chronic anemia today.  He does not feel that patient is in need of dialysis at this time.      Suspected cholecystitis does not require surgical intervention at  this time per Dr. Maldonado notes and patient received IV Zosyn for 5 days.   Patient currently with pacemaker in place with recommendations for AICD and Life Vest until further evaluation by primary cardiologist (Dr. Bass) per Dr. Allen notes.  Discussed with Dr. Bass who advised that ICD placement and/ or Life Vest placement would not be considered due to dismal pulmonary status. Heparin drip has been stopped, Eliquis 5mg BID started per Cardiology recommendation.       Palliative team has been consulted and is following for possible hospice discussions and placement at [facility]. They have been in contact with patient's daughter and sister.  Patient advised today that he wanted to talk to his family about discharging to hospice vs LTC and they are currently at bedside.  Discussed options with sister.  Continue to monitor closely as patient's condition is guarded.  Discharge plan pending.  Dr. Garcia to follow up with patient this afternoon.       DVT Prophylaxis: Eliquis  Code Status: DNR  Diet: N.p.o. except for ice chips.  Discharge Plan: Pending palliative follow up    Anna Piña, PAL  08/05/21  11:44 EDT    Dictated utilizing Dragon dictation.

## 2021-08-05 NOTE — PLAN OF CARE
Goal Outcome Evaluation:  Plan of Care Reviewed With: patient        Progress: improving  Outcome Summary: Patient continuet to be on bipap to meet oxygen needs. Urine is orange in color. 24 hour urine still in progress. Vital signs have been stable and will continue to monitor. Frequently seeks out staff. He seems scared and wants staff in frequently. Waiting on plans for him to go to [facility] care center.

## 2021-08-05 NOTE — PHARMACY RECOMMENDATION
"Pharmacokinetic Follow-up Note - Vancomycin     Pharmacy was consulted to dose vancomycin for  Jani Pena, a 71 y.o. male  172.7 cm (68\") 79.7 kg (175 lb 11.3 oz)     Indication for use: sepsis secondary to pneumonia and MRSA bacteremia    Results from last 7 days   Lab Units 08/05/21  0557 08/04/21  0554 08/03/21  0506   WBC 10*3/mm3 10.35 11.66* 11.76*   CREATININE mg/dL 2.45* 3.27* 3.85*      Estimated Creatinine Clearance: 31.2 mL/min (A) (by C-G formula based on SCr of 2.45 mg/dL (H)).  Temp Readings from Last 1 Encounters:   08/05/21 98.4 °F (36.9 °C) (Temporal)     Lab Results   Component Value Date    VANCOTROUGH 21.50 (H) 07/29/2021    VANCORANDOM 17.70 08/05/2021       Microbiology:  Microbiology Results (last 10 days)       Procedure Component Value - Date/Time    Blood Culture With KATE - Blood, Arm, Right [[id number]]  (Abnormal) Collected: 08/04/21 1420    Lab Status: Preliminary result Specimen: Blood from Arm, Right Updated: 08/05/21 0826     Blood Culture Abnormal Stain     Gram Stain Aerobic Bottle Gram positive cocci in clusters      Anaerobic Bottle Gram positive cocci in clusters    Blood Culture With KATE - Blood, Arm, Left [[id number]]  (Abnormal) Collected: 08/04/21 1414    Lab Status: Preliminary result Specimen: Blood from Arm, Left Updated: 08/05/21 0529     Blood Culture Abnormal Stain     Gram Stain Aerobic Bottle Gram positive cocci in clusters    COVID-19,[facility]HOU[facility],Nasal Swab No Transport Media 3-4 HR TAT - Swab, Nasal Cavity [[id number]]  (Normal) Collected: 08/03/21 1738    Lab Status: Final result Specimen: Swab from Nasal Cavity Updated: 08/03/21 1833     COVID19 Not Detected    Narrative:      Fact sheet for providers: https://www.fda.gov/media/099594/download     Fact sheet for patients: https://www.fda.gov/media/401134/download    Test performed by PCR.    Consider negative results in combination with clinical observations, patient history, and epidemiological " information.    Blood Culture With KATE - Blood, Arm, Left [[id number]]  (Abnormal) Collected: 08/02/21 0745    Lab Status: Preliminary result Specimen: Blood from Arm, Left Updated: 08/04/21 1622     Blood Culture Staphylococcus aureus, MRSA     Comment: Methicillin resistant Staphylococcus aureus, Patient may be an isolation risk.        Isolated from Aerobic and Anaerobic Bottles     Gram Stain Aerobic Bottle Gram negative cocci      Anaerobic Bottle Gram positive cocci in clusters    Blood Culture With KATE - Blood, Arm, Left [[id number]]  (Abnormal) Collected: 08/01/21 2310    Lab Status: Final result Specimen: Blood from Arm, Left Updated: 08/03/21 1143     Blood Culture Staphylococcus aureus, MRSA     Comment: Methicillin resistant Staphylococcus aureus, Patient may be an isolation risk.        Isolated from Pediatric Bottle     Gram Stain Pediatric Bottle Gram positive cocci in clusters    Narrative:      Refer to previous blood culture collected on 07/28/21 at 18:54 for MICs.      Urine Culture - Urine, Urine, Clean Catch [[id number]]  (Normal) Collected: 07/30/21 0000    Lab Status: Final result Specimen: Urine, Clean Catch Updated: 08/01/21 1718     Urine Culture No growth    MRSA Screen, PCR (Inpatient) - Swab, Nares [[id number]]  (Abnormal) Collected: 07/29/21 0207    Lab Status: Final result Specimen: Swab from Nares Updated: 07/29/21 1348     MRSA PCR MRSA Detected    Respiratory Culture - Sputum, Cough [[id number]] Collected: 07/28/21 1953    Lab Status: Final result Specimen: Sputum from Cough Updated: 07/28/21 2035     Respiratory Culture Rejected    Narrative:      Specimen rejected due to microscopic exam of gram stain.    Respiratory Panel, PCR (WITHOUT COVID) - Swab, Nasopharynx [[id number]]  (Normal) Collected: 07/28/21 1953    Lab Status: Final result Specimen: Swab from Nasopharynx Updated: 07/28/21 2210     ADENOVIRUS, PCR Not Detected     Coronavirus 229E Not Detected     Coronavirus HKU1 Not  Detected     Coronavirus NL63 Not Detected     Coronavirus OC43 Not Detected     Human Metapneumovirus Not Detected     Human Rhinovirus/Enterovirus Not Detected     Influenza B PCR Not Detected     Parainfluenza Virus 1 Not Detected     Parainfluenza Virus 2 Not Detected     Parainfluenza Virus 3 Not Detected     Parainfluenza Virus 4 Not Detected     Bordetella pertussis pcr Not Detected     Influenza A H1 2009 PCR Not Detected     Chlamydophila pneumoniae PCR Not Detected     Mycoplasma pneumo by PCR Not Detected     Influenza A PCR Not Detected     Influenza A H3 Not Detected     Influenza A H1 Not Detected     RSV, PCR Not Detected     Bordetella parapertussis PCR Not Detected    Narrative:      The coronavirus on the RVP is NOT COVID-19 and is NOT indicative of infection with COVID-19.    In the setting of a positive respiratory panel with a viral infection PLUS a negative procalcitonin without other underlying concern for bacterial infection, consider observing off antibiotics or discontinuation of antibiotics and continue supportive care. If the respiratory panel is positive for atypical bacterial infection (Bordetella pertussis, Chlamydophila pneumoniae, or Mycoplasma pneumoniae), consider antibiotic de-escalation to target atypical bacterial infection.    Blood Culture - Blood, Arm, Left [[id number]]  (Abnormal) Collected: 07/28/21 1854    Lab Status: Final result Specimen: Blood from Arm, Left Updated: 07/31/21 0928     Blood Culture Staphylococcus aureus, MRSA     Comment: Infectious disease consultation is highly recommended to rule out distant foci of infection.  Methicillin resistant Staphylococcus aureus, Patient may be an isolation risk.        Isolated from Pediatric Bottle     Gram Stain Pediatric Bottle Gram positive cocci in clusters    Narrative:      Refer to previous blood culture collected on 7/28 for KEVIN's      Blood Culture - Blood, Arm, Left [[id number]]  (Abnormal)  (Susceptibility)  Collected: 07/28/21 1854    Lab Status: Final result Specimen: Blood from Arm, Left Updated: 07/31/21 0927     Blood Culture Staphylococcus aureus, MRSA     Comment: Infectious disease consultation is highly recommended to rule out distant foci of infection.  Methicillin resistant Staphylococcus aureus, Patient may be an isolation risk.        Isolated from Pediatric Bottle     Gram Stain Pediatric Bottle Gram positive cocci in clusters    Susceptibility        Staphylococcus aureus, MRSA      KEVIN      Gentamicin Susceptible      Oxacillin Resistant      Rifampin Susceptible      Vancomycin Susceptible                 Linear View                       Blood Culture ID, PCR - Blood, Arm, Left [[id number]] Collected: 07/28/21 1854    Lab Status: Final result Specimen: Blood from Arm, Left Updated: 07/29/21 0642    Narrative:      Staphylococcus aureus MRSA Detected.     COVID-19,[facility],Nasal Swab No Transport Media 3-4 HR TAT - Swab, Nasal Cavity [[id number]]  (Normal) Collected: 07/28/21 1845    Lab Status: Final result Specimen: Swab from Nasal Cavity Updated: 07/28/21 1921     COVID19 Not Detected    Narrative:      Fact sheet for providers: https://www.fda.gov/media/385382/download     Fact sheet for patients: https://www.fda.gov/media/263557/download    Test performed by PCR.    Consider negative results in combination with clinical observations, patient history, and epidemiological information.    Urine Culture - Urine, Urine, Clean Catch [[id number]] Collected: 07/26/21 1019    Lab Status: Final result Specimen: Urine, Clean Catch Updated: 07/27/21 1215     Urine Culture 50,000 CFU/mL Mixed Tita Isolated    Narrative:      Specimen contains mixed organisms of questionable pathogenicity which indicates contamination with commensal tita.  Further identification is unlikely to provide clinically useful information.  Suggest recollection.            Current Vancomycin Dose:  dosing per  levels      Assessment/Plan:  Vancomycin level is 17.7 mcg/ml today. Will schedule a 500 mg dose this morning and continue dosing by levels. Pharmacy will continue to monitor renal function and adjust dose accordingly.    Nir UribeD, BCPS  08/05/21 08:55 EDT

## 2021-08-06 NOTE — PLAN OF CARE
Goal Outcome Evaluation:      Patient anxious intermittently throughout day, O2 sats maintained on 5L NC.

## 2021-08-06 NOTE — PROGRESS NOTES
[facility]    Name:  Jani Pena   Age:  71 y.o.  Sex:  male  :  1950  MRN:  [id number]   Visit Number:  [id number]  Admission Date:  2021  Date Of Service:  21  Primary Care Physician:  Miguel Rojas MD     LOS: 9 days :    Chief Complaint:      Follow-up on shortness of breath    Subjective:    Patient seen at bedside.  He has an old friend at bedside.  He states overall he is feeling better today.  He is requesting increased anxiety medicine.  He notes he is agreeable to going to the [facility] center.    Hospital Course:    Patient is a 71-year-old gentleman known to the hospital service who presented with multiple complaints.  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  His initial work-up in the emergency room was concerning for potential pneumonia as well as acute cholecystitis and acute renal failure.  He was admitted to the hospital.  He underwent work-up and was started on empiric antibiotics vancomycin and Zosyn.  His HIDA scan was less concerning for acute cholecystitis.  He did have initial positive blood cultures for MRSA and was on vancomycin.  Repeat cultures were positive.  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  He also had nonsustained V. tach.  Cardiology was initially recommending LifeVest and consideration of AICD.  Hospital service discussed the case with patient's primary cardiologist who did not recommend any further interventions due to his poor pulmonary status.  Palliative care and hospice was consulted.  Patient is agreeable to proceeding with compassionate care center placement.  Currently waiting on family out of town prior to transfer to their facility    Review of Systems:     All systems were reviewed and  negative except as mentioned in subjective, assessment and plan.    Vital Signs:    Temp:  [97.3 °F (36.3 °C)-98.3 °F (36.8 °C)] 97.3 °F (36.3 °C)  Heart Rate:  [60-88] 60  Resp:  [15-18] 18  BP: (107-155)/(50-70) 107/55    Intake and output:    I/O last 3 completed shifts:  In: 1278.8 [P.O.:360; Blood:318.8; Other:600]  Out: 2700 [Urine:2700]  I/O this shift:  In: 220 [P.O.:120; IV Piggyback:100]  Out: -     Physical Examination:    General Appearance:  Alert and cooperative.  Chronically ill-appearing   Head:  Atraumatic and normocephalic.   Eyes: Conjunctivae and sclerae normal, no icterus. No pallor.   Throat: No oral lesions, no thrush, oral mucosa moist.   Neck: Supple, trachea midline, no thyromegaly.   Lungs:   Breath sounds heard bilaterally equally.  Scattered rhonchi.   Heart:  Normal S1 and S2, no murmur, no gallop, no rub. No JVD.   Abdomen:   Normal bowel sounds, no masses, no organomegaly. Soft, nontender, nondistended, no rebound tenderness.  Tinajero catheter noted.   Extremities: Supple, no edema, no cyanosis, no clubbing.   Skin: No bleeding or rash.   Neurologic: Alert and oriented x 3. No facial asymmetry. Moves all four limbs. No tremors.  Frail     Laboratory results:    Results from last 7 days   Lab Units 08/06/21  0520 08/05/21  0557 08/04/21  0554   SODIUM mmol/L 142 145 143   POTASSIUM mmol/L 3.8 3.6 3.6   CHLORIDE mmol/L 95* 94* 96*   CO2 mmol/L 35.2* 31.9* 32.7*   BUN mg/dL 74* 80* 88*   CREATININE mg/dL 2.28* 2.45* 3.27*   CALCIUM mg/dL 8.4* 8.7 8.5*   BILIRUBIN mg/dL 0.7 0.6 0.6   ALK PHOS U/L 78 83 86   ALT (SGPT) U/L 12 14 15   AST (SGOT) U/L 24 37 44*   GLUCOSE mg/dL 100* 68 75     Results from last 7 days   Lab Units 08/06/21  0520 08/05/21  0557 08/04/21  0554   WBC 10*3/mm3 9.17 10.35 11.66*   HEMOGLOBIN g/dL 9.1* 7.8* 7.7*   HEMATOCRIT % 29.7* 26.3* 26.3*   PLATELETS 10*3/mm3 195 218 253     Results from last 7 days   Lab Units 08/01/21  1544   INR  1.19*     Results from last 7  days   Lab Units 07/31/21  0205   TROPONIN T ng/mL 0.065*     Results from last 7 days   Lab Units 08/04/21  1420 08/04/21  1414 08/02/21  0745 08/01/21  2310   BLOODCX  Staphylococcus aureus, MRSA* Staphylococcus aureus, MRSA* Staphylococcus aureus, MRSA* Staphylococcus aureus, MRSA*         I have reviewed the patient's laboratory results.    Radiology results:    No radiology results from the last 24 hrs  I have reviewed the patient's radiology reports.    Medication Review:     I have reviewed the patient's active and prn medications.     Problem List:      Pneumonia of both lower lobes due to methicillin resistant Staphylococcus aureus (MRSA) (CMS/[facility])    Atrial fibrillation (CMS/[facility])    Chronic kidney disease    Steroid-dependent COPD (CMS/[facility])    Gastroesophageal reflux disease    Depression    Chronic diastolic congestive heart failure (CMS/[facility])    Anemia    Sick sinus syndrome (CMS/[facility])    Other dysphagia    Acute respiratory failure with hypercapnia (CMS/[facility])    Calculus of gallbladder without cholecystitis without obstruction    MRSA bacteremia      Assessment:    1. Concern for bilateral pneumonia, MRSA suspected  2. Acute respiratory failure with hypoxia/hypercapnia  3. Calculus of gallbladder without cholecystitis  4. MRSA bacteremia, with tricuspid endocarditis concern  5. GERD  6. COPD  7. Atrial fibrillation on Eliquis  8. Acute on chronic renal failure    Plan:    We will discontinue IV antibiotics today.  We will continue other medications for blood pressure and volume control per nephrology.  Continue with as needed pain control, antiemetics, anxiety medication.  Continue COPD management.  Patient did confirm [facility] transfer tomorrow.  He understands all of this and does not wish to have any further aggressive treatment.    DVT Prophylaxis: Eliquis  Code Status: DNR/DNI, primarily comfort  Diet: As tolerated per speech therapy  Discharge Plan: [facility]  tomorrow    Iris Alas DO  08/06/21  14:05 EDT    Dictated utilizing Dragon dictation.

## 2021-08-06 NOTE — PROGRESS NOTES
"Pharmacokinetic Follow-up Note - Vancomycin     Jani Pena is a 71 y.o. male  172.7 cm (68\") 79.7 kg (175 lb 11.3 oz)     Indication for use: Sepsis secondary to pneumonia and MRSA bacteremia    Results from last 7 days   Lab Units 08/06/21  0520 08/05/21  0557 08/04/21  0554   WBC 10*3/mm3 9.17 10.35 11.66*   CREATININE mg/dL 2.28* 2.45* 3.27*      Estimated Creatinine Clearance: 33.5 mL/min (A) (by C-G formula based on SCr of 2.28 mg/dL (H)).  Temp Readings from Last 1 Encounters:   08/06/21 98.3 °F (36.8 °C) (Axillary)     Lab Results   Component Value Date    VANCOTROUGH 21.50 (H) 07/29/2021    VANCORANDOM 17.90 08/06/2021       Microbiology:  Microbiology Results (last 10 days)       Procedure Component Value - Date/Time    Blood Culture With KATE - Blood, Arm, Right [[id number]]  (Abnormal) Collected: 08/04/21 1420    Lab Status: Preliminary result Specimen: Blood from Arm, Right Updated: 08/05/21 0826     Blood Culture Abnormal Stain     Gram Stain Aerobic Bottle Gram positive cocci in clusters      Anaerobic Bottle Gram positive cocci in clusters    Blood Culture With KATE - Blood, Arm, Left [[id number]]  (Abnormal) Collected: 08/04/21 1414    Lab Status: Preliminary result Specimen: Blood from Arm, Left Updated: 08/05/21 1314     Blood Culture Abnormal Stain     Gram Stain Aerobic Bottle Gram positive cocci in clusters      Anaerobic Bottle Gram positive cocci in clusters    COVID-19,[facility]HOU[facility],Nasal Swab No Transport Media 3-4 HR TAT - Swab, Nasal Cavity [[id number]]  (Normal) Collected: 08/03/21 1738    Lab Status: Final result Specimen: Swab from Nasal Cavity Updated: 08/03/21 1833     COVID19 Not Detected    Narrative:      Fact sheet for providers: https://www.fda.gov/media/117094/download     Fact sheet for patients: https://www.fda.gov/media/673417/download    Test performed by PCR.    Consider negative results in combination with clinical observations, patient history, and epidemiological " information.    Blood Culture With KATE - Blood, Arm, Left [[id number]]  (Abnormal) Collected: 08/02/21 0745    Lab Status: Preliminary result Specimen: Blood from Arm, Left Updated: 08/04/21 1622     Blood Culture Staphylococcus aureus, MRSA     Comment: Methicillin resistant Staphylococcus aureus, Patient may be an isolation risk.        Isolated from Aerobic and Anaerobic Bottles     Gram Stain Aerobic Bottle Gram negative cocci      Anaerobic Bottle Gram positive cocci in clusters    Blood Culture With KATE - Blood, Arm, Left [[id number]]  (Abnormal) Collected: 08/01/21 2310    Lab Status: Final result Specimen: Blood from Arm, Left Updated: 08/03/21 1143     Blood Culture Staphylococcus aureus, MRSA     Comment: Methicillin resistant Staphylococcus aureus, Patient may be an isolation risk.        Isolated from Pediatric Bottle     Gram Stain Pediatric Bottle Gram positive cocci in clusters    Narrative:      Refer to previous blood culture collected on 07/28/21 at 18:54 for MICs.      Urine Culture - Urine, Urine, Clean Catch [[id number]]  (Normal) Collected: 07/30/21 0000    Lab Status: Final result Specimen: Urine, Clean Catch Updated: 08/01/21 1718     Urine Culture No growth    MRSA Screen, PCR (Inpatient) - Swab, Nares [[id number]]  (Abnormal) Collected: 07/29/21 0207    Lab Status: Final result Specimen: Swab from Nares Updated: 07/29/21 1348     MRSA PCR MRSA Detected    Respiratory Culture - Sputum, Cough [[id number]] Collected: 07/28/21 1953    Lab Status: Final result Specimen: Sputum from Cough Updated: 07/28/21 2035     Respiratory Culture Rejected    Narrative:      Specimen rejected due to microscopic exam of gram stain.    Respiratory Panel, PCR (WITHOUT COVID) - Swab, Nasopharynx [[id number]]  (Normal) Collected: 07/28/21 1953    Lab Status: Final result Specimen: Swab from Nasopharynx Updated: 07/28/21 2210     ADENOVIRUS, PCR Not Detected     Coronavirus 229E Not Detected     Coronavirus HKU1 Not  Detected     Coronavirus NL63 Not Detected     Coronavirus OC43 Not Detected     Human Metapneumovirus Not Detected     Human Rhinovirus/Enterovirus Not Detected     Influenza B PCR Not Detected     Parainfluenza Virus 1 Not Detected     Parainfluenza Virus 2 Not Detected     Parainfluenza Virus 3 Not Detected     Parainfluenza Virus 4 Not Detected     Bordetella pertussis pcr Not Detected     Influenza A H1 2009 PCR Not Detected     Chlamydophila pneumoniae PCR Not Detected     Mycoplasma pneumo by PCR Not Detected     Influenza A PCR Not Detected     Influenza A H3 Not Detected     Influenza A H1 Not Detected     RSV, PCR Not Detected     Bordetella parapertussis PCR Not Detected    Narrative:      The coronavirus on the RVP is NOT COVID-19 and is NOT indicative of infection with COVID-19.    In the setting of a positive respiratory panel with a viral infection PLUS a negative procalcitonin without other underlying concern for bacterial infection, consider observing off antibiotics or discontinuation of antibiotics and continue supportive care. If the respiratory panel is positive for atypical bacterial infection (Bordetella pertussis, Chlamydophila pneumoniae, or Mycoplasma pneumoniae), consider antibiotic de-escalation to target atypical bacterial infection.    Blood Culture - Blood, Arm, Left [[id number]]  (Abnormal) Collected: 07/28/21 1854    Lab Status: Final result Specimen: Blood from Arm, Left Updated: 07/31/21 0928     Blood Culture Staphylococcus aureus, MRSA     Comment: Infectious disease consultation is highly recommended to rule out distant foci of infection.  Methicillin resistant Staphylococcus aureus, Patient may be an isolation risk.        Isolated from Pediatric Bottle     Gram Stain Pediatric Bottle Gram positive cocci in clusters    Narrative:      Refer to previous blood culture collected on 7/28 for KEVIN's      Blood Culture - Blood, Arm, Left [[id number]]  (Abnormal)  (Susceptibility)  Collected: 07/28/21 1854    Lab Status: Final result Specimen: Blood from Arm, Left Updated: 07/31/21 0927     Blood Culture Staphylococcus aureus, MRSA     Comment: Infectious disease consultation is highly recommended to rule out distant foci of infection.  Methicillin resistant Staphylococcus aureus, Patient may be an isolation risk.        Isolated from Pediatric Bottle     Gram Stain Pediatric Bottle Gram positive cocci in clusters    Susceptibility        Staphylococcus aureus, MRSA      KEVIN      Gentamicin Susceptible      Oxacillin Resistant      Rifampin Susceptible      Vancomycin Susceptible                 Linear View                       Blood Culture ID, PCR - Blood, Arm, Left [[id number]] Collected: 07/28/21 1854    Lab Status: Final result Specimen: Blood from Arm, Left Updated: 07/29/21 0642    Narrative:      Staphylococcus aureus MRSA Detected.     COVID-19,[facility] IN[facility]HOU[facility],Nasal Swab No Transport Media 3-4 HR TAT - Swab, Nasal Cavity [[id number]]  (Normal) Collected: 07/28/21 1845    Lab Status: Final result Specimen: Swab from Nasal Cavity Updated: 07/28/21 1921     COVID19 Not Detected    Narrative:      Fact sheet for providers: https://www.fda.gov/media/716109/download     Fact sheet for patients: https://www.fda.gov/media/278617/download    Test performed by PCR.    Consider negative results in combination with clinical observations, patient history, and epidemiological information.            Current Vancomycin Dose:  Dosing per levels    Assessment/Plan:  Mr. Pena continues on day 9 of vancomycin. A random level was reported as 17.9 mg/L. Will schedule a 500mg dose today and continue dosing by levels. Pharmacy will continue to monitor renal function and adjust dose accordingly.      Hermelinda Joseph, Katina   08/06/21 07:22 EDT

## 2021-08-06 NOTE — PLAN OF CARE
Goal Outcome Evaluation:  Plan of Care Reviewed With: patient        Progress: improving  Outcome Summary: VSS.  Pt called out frequently for pain meds.  PRN meds were given.  No other changes in pt condition to report.  Will continue to monitor.

## 2021-08-06 NOTE — PROGRESS NOTES
Adult Nutrition  Assessment/PES    Patient Name:  Jani Pena  YOB: 1950  MRN: [id number]  Admit Date:  7/28/2021    Assessment Date:  8/6/2021    Comments:    Recommend:  1. Consider adding renal modification to current diet order as medically appropriate and tolerated.  2. Encourage PO intake. Average intake ~9% x 9 meals.   3. RD ordered Novasource Renal BID. Continue Boost Pudding TID.  4. Consider a renal multivitamin with minerals daily.  5. Continue to monitor and replace electrolytes PRN.     RD to follow pt and available PRN.    Reason for Assessment     Row Name 08/06/21 1350          Reason for Assessment    Reason For Assessment  follow-up protocol     Diagnosis  diabetes diagnosis/complications;cardiac disease;gastrointestinal disease;pulmonary disease;renal disease;other (see comments) Anemia, Heart failure, A/C respiratory failure, ARTIS/CKD Stage 3     Identified At Risk by Screening Criteria  difficulty chewing/swallowing;large or nonhealing wound, burn or pressure injury;other (see comments) LACE             Labs/Tests/Procedures/Meds     Row Name 08/06/21 1350          Labs/Procedures/Meds    Lab Results Reviewed  reviewed, pertinent     Lab Results Comments  Low; Cl, Alb; High: BUN, Cr, Gluc, Phos, Mg        Medications    Pertinent Medications Reviewed  reviewed, pertinent     Pertinent Medications Comments  Dulcolax, Lipitor, Protonix, Miralax, Pericolace         Physical Findings     Row Name 08/06/21 1351          Physical Findings    Skin  pressure injury Upper sacral spine           Nutrition Prescription Ordered     Row Name 08/06/21 1351          Nutrition Prescription PO    Current PO Diet  Pureed     Supplement  Boost Pudding (Ensure Pudding)     Supplement Frequency  3 times a day         Evaluation of Received Nutrient/Fluid Intake     Row Name 08/06/21 1351          PO Evaluation    Number of Days PO Intake Evaluated  3 days     Number of Meals  9     % PO Intake   9               Problem/Interventions:  Problem 1     Row Name 08/06/21 1353          Nutrition Diagnoses Problem 1    Problem 1  Predicted Suboptimal Intake     Etiology (related to)  Factors Affecting Nutrition     Appetite  Poor     Signs/Symptoms (evidenced by)  PO Intake     Percent (%) intake recorded  9 %     Over number of meals  9         Problem 2     Row Name 08/06/21 1353          Nutrition Diagnoses Problem 2    Problem 2  Impaired Nutrient Utilization     Etiology (related to)  Medical Diagnosis     Endocrine  DM Type 2     Renal  CKD;ARTIS     Signs/Symptoms (evidenced by)  Biochemical     Specific Labs Noted  BUN;Creatinine             Intervention Goal     Row Name 08/06/21 1353          Intervention Goal    General  Meet nutritional needs for age/condition;Improved nutrition related lab(s)     PO  Meet estimated needs;Increase intake;PO intake (%)     PO Intake %  50 %     Weight  Maintain weight         Nutrition Intervention     Row Name 08/06/21 1353          Nutrition Intervention    RD/Tech Action  Follow Tx progress;Recommend/ordered;Encourage intake     Recommended/Ordered  Supplement         Nutrition Prescription     Row Name 08/06/21 135          Nutrition Prescription PO    PO Prescription  Begin/change supplement     Supplement  Nova Renal     Supplement Frequency  2 times a day     New PO Prescription Ordered?  Yes        Other Orders    Obtain Weight  Daily     Obtain Weight Ordered?  No, recommended     Supplement  Vitamin mineral supplement Renal     Supplement Ordered?  No, recommended     Other  Continue to monitor and replace electrolytes PRN         Education/Evaluation     Row Name 08/06/21 4404          Education    Education  Education not appropriate at this time     Please explain  Other (comment) D/C to LTC        Monitor/Evaluation    Monitor  Per protocol;I&O;PO intake;Supplement intake;Pertinent labs;Weight;Skin status           Electronically signed by:  Rachel Stacy  LEONARDA Simmons  08/06/21 13:55 EDT

## 2021-08-06 NOTE — PROGRESS NOTES
"Nephrology Progress Note.      LOS: 9 days    Patient Care Team:  Miguel Rojas MD as PCP - General (Internal Medicine)  Ulysses Bass MD as Cardiologist (Cardiology)    Chief Complaint:    Chief Complaint   Patient presents with   • Shortness of Breath       Subjective:   Follow up for ARTIS and Chronic Kidney disease stage 3    Interval History:   Patient Complaints: none  Patient seen and examined this morning.  Events from last 24 hours noted.  Patient is sitting up in bed on 5 liters nasal cannula requesting pain medication.   He states he is feeling some better after blood transfusion.  Denies having any fevers chills.  No nausea or vomiting no abdominal pain.  Denies any chest pain, shortness of breath, he is complaining of episodic cough and sputum production with occasional shortness of breath.  There is no significant edema.   Patient also denies having new onset weakness of numbness of either extremity.  Ongoing discussions about inpatient hospice.  History taken from: patient    Objective:    Vital Signs  /57 (BP Location: Right arm, Patient Position: Lying)   Pulse 66   Temp 97.6 °F (36.4 °C) (Axillary)   Resp 18   Ht 172.7 cm (68\")   Wt 79.7 kg (175 lb 11.3 oz)   SpO2 97%   BMI 26.72 kg/m²     No intake/output data recorded.    Intake/Output Summary (Last 24 hours) at 8/6/2021 0848  Last data filed at 8/6/2021 0557  Gross per 24 hour   Intake 678.75 ml   Output 1050 ml   Net -371.25 ml       Physical Exam:  General Appearance: alert, oriented x 3, no acute distress,   HEENT: Oral mucosa dry, extra occular movements intact. Sclera clear.  Skin: Warm and dry  Neck: supple, no JVD, trachea midline  Lungs:Chest shape is normal. Breath sounds heard bilaterally equally.  Coarse breath sounds all over the chest with expiratory wheezing in the bases.  Heart: Irregular rate and rhythm. normal S1 and S2, no S3, no rub, peripheral pulses weak but palpable.  Abdomen: Obese, soft, non-tender, present " bowel sounds to auscultation.    : no palpable bladder.  Extremities: Trace edema, no cyanosis or clubbing.   Neuro: normal speech and mental status, grossly non focal.     Results Review:   Results from last 7 days   Lab Units 08/06/21  0520 08/05/21  0557 08/04/21  0554   SODIUM mmol/L 142 145 143   POTASSIUM mmol/L 3.8 3.6 3.6   CHLORIDE mmol/L 95* 94* 96*   CO2 mmol/L 35.2* 31.9* 32.7*   BUN mg/dL 74* 80* 88*   CREATININE mg/dL 2.28* 2.45* 3.27*   CALCIUM mg/dL 8.4* 8.7 8.5*   ALBUMIN g/dL 2.90* 3.00* 3.10*   BILIRUBIN mg/dL 0.7 0.6 0.6   ALK PHOS U/L 78 83 86   ALT (SGPT) U/L 12 14 15   AST (SGOT) U/L 24 37 44*   GLUCOSE mg/dL 100* 68 75     Estimated Creatinine Clearance: 33.5 mL/min (A) (by C-G formula based on SCr of 2.28 mg/dL (H)).  Results from last 7 days   Lab Units 08/02/21  0745 08/01/21  0558 07/31/21  0656   MAGNESIUM mg/dL 3.0* 3.0*  --    PHOSPHORUS mg/dL 4.7* 6.2* 5.8*     Results from last 7 days   Lab Units 08/02/21  0745 08/01/21  0558   URIC ACID mg/dL 10.8* 11.4*     Results from last 7 days   Lab Units 08/06/21  0520 08/05/21  0557 08/04/21  0554 08/03/21  0506 08/02/21  0745   WBC 10*3/mm3 9.17 10.35 11.66* 11.76* 14.80*   HEMOGLOBIN g/dL 9.1* 7.8* 7.7* 7.4* 8.2*   PLATELETS 10*3/mm3 195 218 253 249 259     Results from last 7 days   Lab Units 08/01/21  1544   INR  1.19*     Brief Urine Lab Results  (Last result in the past 365 days)      Color   Clarity   Blood   Leuk Est   Nitrite   Protein   CREAT   Urine HCG        08/05/21 1241             34.7       08/05/21 1241             34.7           No results found for: UTPCR  Imaging Results (Last 24 Hours)     ** No results found for the last 24 hours. **        apixaban, 5 mg, Oral, Q12H  aspirin, 81 mg, Oral, Daily  atorvastatin, 10 mg, Oral, Q PM  bisacodyl, 10 mg, Rectal, Daily  bisoprolol, 10 mg, Oral, Q24H  carboxymethylcellulose, 1 drop, Both Eyes, TID  clopidogrel, 75 mg, Oral, Daily  escitalopram, 10 mg, Oral, Daily  finasteride,  5 mg, Oral, Daily  hydrALAZINE, 10 mg, Oral, Q8H  ipratropium-albuterol, 3 mL, Nebulization, Q6H - RT  iron polysaccharides, 150 mg, Oral, Daily  isosorbide dinitrate, 10 mg, Oral, TID - Nitrates  lactobacillus acidophilus, 1 capsule, Oral, BID  mupirocin, , Topical, Q12H  pantoprazole, 40 mg, Oral, Daily  polyethylene glycol, 17 g, Oral, Daily  senna-docusate sodium, 1 tablet, Oral, BID  sodium chloride, 10 mL, Intravenous, Q12H  tamsulosin, 0.4 mg, Oral, Nightly  torsemide, 100 mg, Oral, Daily  vancomycin, 500 mg, Intravenous, Once      Pharmacy to dose vancomycin,           Medication Review:   Current Facility-Administered Medications   Medication Dose Route Frequency Provider Last Rate Last Admin   • acetaminophen (TYLENOL) tablet 650 mg  650 mg Oral Q4H PRN Ferdinand Greer MD   650 mg at 08/01/21 2128    Or   • acetaminophen (TYLENOL) 160 MG/5ML solution 650 mg  650 mg Oral Q4H PRN Ferdinand Greer MD        Or   • acetaminophen (TYLENOL) suppository 650 mg  650 mg Rectal Q4H PRN Ferdinand Greer MD       • ALPRAZolam (XANAX) tablet 0.25 mg  0.25 mg Oral BID PRN Ferdinand Greer MD   0.25 mg at 08/05/21 1431   • apixaban (ELIQUIS) tablet 5 mg  5 mg Oral Q12H Anna Piña APRN   5 mg at 08/06/21 0822   • aspirin chewable tablet 81 mg  81 mg Oral Daily Ferdinand Greer MD   81 mg at 08/06/21 0821   • atorvastatin (LIPITOR) tablet 10 mg  10 mg Oral Q PM Ferdinand Greer MD   10 mg at 08/05/21 1641   • bisacodyl (DULCOLAX) suppository 10 mg  10 mg Rectal Daily Ferdinand Greer MD   10 mg at 08/03/21 0817   • bisoprolol (ZEBeta) tablet 10 mg  10 mg Oral Q24H Jose Allen MD   10 mg at 08/06/21 0822   • carboxymethylcellulose (REFRESH PLUS) 0.5 % ophthalmic solution 1 drop  1 drop Both Eyes TID Ferdinand Greer MD   1 drop at 08/06/21 0822   • clopidogrel (PLAVIX) tablet 75 mg  75 mg Oral Daily Ferdinand Greer MD   75 mg at 08/06/21 0822   • escitalopram (LEXAPRO) tablet 10 mg  10 mg Oral Daily Ferdinand Greer MD   10 mg at  08/06/21 0822   • finasteride (PROSCAR) tablet 5 mg  5 mg Oral Daily Ferdinand Greer MD   5 mg at 08/06/21 0822   • hydrALAZINE (APRESOLINE) tablet 10 mg  10 mg Oral Q8H Jose Allen MD   10 mg at 08/06/21 0513   • HYDROcodone-acetaminophen (NORCO) 5-325 MG per tablet 1 tablet  1 tablet Oral Q8H PRN Ferdinand Greer MD   1 tablet at 08/06/21 0513   • HYDROmorphone (DILAUDID) injection 0.5 mg  0.5 mg Intravenous Q2H PRN Nichole Spicer DO   0.5 mg at 08/06/21 0822   • ipratropium-albuterol (DUO-NEB) nebulizer solution 3 mL  3 mL Nebulization Q6H - RT Ferdinand Greer MD   3 mL at 08/06/21 0651   • ipratropium-albuterol (DUO-NEB) nebulizer solution 3 mL  3 mL Nebulization Q4H PRN Ferdinand Greer MD       • iron polysaccharides (NIFEREX) capsule 150 mg  150 mg Oral Daily Markel Owens MD, FASN   150 mg at 08/06/21 0822   • isosorbide dinitrate (ISORDIL) tablet 10 mg  10 mg Oral TID - Nitrates Jose Allen MD   10 mg at 08/06/21 0822   • lactobacillus acidophilus (RISAQUAD) capsule 1 capsule  1 capsule Oral BID Ferdinand Greer MD   1 capsule at 08/06/21 0822   • LORazepam (ATIVAN) injection 0.25 mg  0.25 mg Intravenous Q4H PRN Db Guillory DO       • mupirocin (BACTROBAN) 2 % ointment   Topical Q12H Ferdinand Greer MD   Given at 08/05/21 2101   • ondansetron (ZOFRAN) injection 4 mg  4 mg Intravenous Q6H PRN Ferdinand Greer MD   4 mg at 07/30/21 1940   • pantoprazole (PROTONIX) EC tablet 40 mg  40 mg Oral Daily Ferdinand Greer MD   40 mg at 08/06/21 0822   • Pharmacy to dose vancomycin   Does not apply Continuous PRN Wang, Anna B, APRN       • polyethylene glycol (MIRALAX) packet 17 g  17 g Oral Daily Ferdinand Greer MD   17 g at 08/03/21 0817   • sennosides-docusate (PERICOLACE) 8.6-50 MG per tablet 1 tablet  1 tablet Oral BID Ferdinand Greer MD   1 tablet at 08/05/21 2101   • sodium chloride 0.9 % flush 10 mL  10 mL Intravenous Q12H Ferdinand Greer MD   10 mL at 08/06/21 0824   • sodium chloride 0.9 % flush 10 mL  10 mL  Intravenous PRN Ferdinand Greer MD   10 mL at 07/31/21 0520   • tamsulosin (FLOMAX) 24 hr capsule 0.4 mg  0.4 mg Oral Nightly Ferdinand Greer MD   0.4 mg at 08/05/21 2101   • torsemide (DEMADEX) tablet 100 mg  100 mg Oral Daily Markel Owens MD, FASN   100 mg at 08/06/21 0822   • vancomycin (VANCOCIN) IVPB 500 mg in 100 mL NS  500 mg Intravenous Daily PRN Anna Piña, APRN       • vancomycin (VANCOCIN) IVPB 500 mg in 100 mL NS  500 mg Intravenous Once Ferdinand Greer MD           Assessment/Plan:    1. Acute kidney injury: Most likely hemodynamically mediated secondary to cardiorespiratory issues and cardiorenal syndrome.  Continue to optimize medications.      2. Coronary artery disease status post CABG:  3. Cardiomyopathy with EF of 25 to 30% status post AICD placement.  4. Anemia: s/p 1 unit of PRBC, patient on oral iron.  Not a candidate for IV iron secondary to infection.   5. Acute respiratory failure with hypercapnia (CMS/HCC): Pulmonary consultation has been placed.  6. Sepsis: Gram-positive cocci in clusters on initial blood cultures  7. Abdominal pain: Question of acute cholecystitis versus other abdominal issues, surgical evaluation is in progress, it was finally decided that there is no acute surgical abdomen.      Plan:  · Clinically he appears to be doing fine, since diuresis renal function has continued to improve.  I doubt that he will have any indications for dialysis at this point.  · Hgb stable after 1 unit of PRBC and patient is feeling much better.   · Details discussed with the hospitalist service, plan is to go to [facility] on Saturday once his daughter gets back from vacation.  I do not think dialysis will be an issue at this point.  · Decrease torsemide to 40 mg daily.   · Details were discussed with the patient, as well as the hospitalist service.  · Continue with rest of the current treatment plan and surveillance labs.  · Further recommendations will depend on clinical course of the patient  during the current hospitalization.    · I also discussed the details with the nursing staff.  · Rest as ordered.    PAL Fry  08/06/21  08:48 EDT      Dictated utilizing Dragon dictation.

## 2021-08-07 NOTE — PROGRESS NOTES
[facility]    Name:  Jani Pena   Age:  71 y.o.  Sex:  male  :  1950  MRN:  [id number]   Visit Number:  [id number]  Admission Date:  2021  Date Of Service:  21  Primary Care Physician:  Miguel Rojas MD     LOS: 10 days :    Chief Complaint:      Follow-up on shortness of breath    Subjective:    Patient seen again this morning.  He is still wanting to go to the [facility] center, however wants to wait on his daughter to get back from [place] prior to.  He states it could be today or tomorrow.  He had no other significant complaints.  Nursing staff noted anxiety.    Hospital Course:    Patient is a 71-year-old gentleman known to the hospital service who presented with multiple complaints.  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  His initial work-up in the emergency room was concerning for potential pneumonia as well as acute cholecystitis and acute renal failure.  He was admitted to the hospital.  He underwent work-up and was started on empiric antibiotics vancomycin and Zosyn.  His HIDA scan was less concerning for acute cholecystitis.  He did have initial positive blood cultures for MRSA and was on vancomycin.  Repeat cultures were positive.  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  He also had nonsustained V. tach.  Cardiology was initially recommending LifeVest and consideration of AICD.  Hospital service discussed the case with patient's primary cardiologist who did not recommend any further interventions due to his poor pulmonary status.  Palliative care and hospice was consulted.  Patient is agreeable to proceeding with compassionate care center placement.  Currently waiting on family out of town prior to transfer to their  facility    Review of Systems:     All systems were reviewed and negative except as mentioned in subjective, assessment and plan.    Vital Signs:    Temp:  [97.8 °F (36.6 °C)-98.8 °F (37.1 °C)] 98 °F (36.7 °C)  Heart Rate:  [53-73] 73  Resp:  [18] 18  BP: (100-120)/(47-59) 105/54    Intake and output:    I/O last 3 completed shifts:  In: 700 [P.O.:600; IV Piggyback:100]  Out: 900 [Urine:900]  I/O this shift:  In: 180 [P.O.:180]  Out: 250 [Urine:250]    Physical Examination:    General Appearance:  Alert and cooperative.  Chronically ill-appearing   Head:  Atraumatic and normocephalic.   Eyes: Conjunctivae and sclerae normal, no icterus. No pallor.   Throat: No oral lesions, no thrush, oral mucosa moist.   Neck: Supple, trachea midline, no thyromegaly.   Lungs:   Breath sounds heard bilaterally equally.  Scattered rhonchi.   Heart:  Normal S1 and S2, no murmur, no gallop, no rub. No JVD.   Abdomen:   Normal bowel sounds, no masses, no organomegaly. Soft, nontender, nondistended, no rebound tenderness.  Tinajero catheter noted.   Extremities: Supple, no significant edema, no cyanosis, no clubbing.   Skin: No bleeding or rash.   Neurologic: Alert and oriented x 3. No facial asymmetry. Moves all four limbs. No tremors.  Frail     Laboratory results:    Results from last 7 days   Lab Units 08/06/21  0520 08/05/21  0557 08/04/21  0554   SODIUM mmol/L 142 145 143   POTASSIUM mmol/L 3.8 3.6 3.6   CHLORIDE mmol/L 95* 94* 96*   CO2 mmol/L 35.2* 31.9* 32.7*   BUN mg/dL 74* 80* 88*   CREATININE mg/dL 2.28* 2.45* 3.27*   CALCIUM mg/dL 8.4* 8.7 8.5*   BILIRUBIN mg/dL 0.7 0.6 0.6   ALK PHOS U/L 78 83 86   ALT (SGPT) U/L 12 14 15   AST (SGOT) U/L 24 37 44*   GLUCOSE mg/dL 100* 68 75     Results from last 7 days   Lab Units 08/06/21  0520 08/05/21  0557 08/04/21  0554   WBC 10*3/mm3 9.17 10.35 11.66*   HEMOGLOBIN g/dL 9.1* 7.8* 7.7*   HEMATOCRIT % 29.7* 26.3* 26.3*   PLATELETS 10*3/mm3 195 218 253     Results from last 7 days   Lab  Units 08/01/21  1544   INR  1.19*         Results from last 7 days   Lab Units 08/04/21  1420 08/04/21  1414 08/02/21  0745 08/01/21  2310   BLOODCX  Staphylococcus aureus, MRSA* Staphylococcus aureus, MRSA* Staphylococcus aureus, MRSA* Staphylococcus aureus, MRSA*         I have reviewed the patient's laboratory results.    Radiology results:    No radiology results from the last 24 hrs  I have reviewed the patient's radiology reports.    Medication Review:     I have reviewed the patient's active and prn medications.     Problem List:      Pneumonia of both lower lobes due to methicillin resistant Staphylococcus aureus (MRSA) (CMS/[facility])    Atrial fibrillation (CMS/[facility])    Chronic kidney disease    Steroid-dependent COPD (CMS/[facility])    Gastroesophageal reflux disease    Depression    Chronic diastolic congestive heart failure (CMS/[facility])    Anemia    Sick sinus syndrome (CMS/[facility])    Other dysphagia    Acute respiratory failure with hypercapnia (CMS/[facility])    Calculus of gallbladder without cholecystitis without obstruction    MRSA bacteremia      Assessment:    1. Concern for bilateral pneumonia, MRSA suspected  2. Acute respiratory failure with hypoxia/hypercapnia  3. Calculus of gallbladder without cholecystitis  4. MRSA bacteremia, with tricuspid endocarditis concern  5. GERD  6. COPD  7. Atrial fibrillation on Eliquis  8. Acute on chronic renal failure    Plan:    Continue current medications.  Discontinued IV antibiotics for endocarditis concerns as patient is going to the [facility].  Discussed with patient he can take medications for blood pressure and fluid control if wanted, will continue for now.  Continue COPD management.  Talked with Dr. Garcia, she is in agreement with having patient go to [facility].  Once family has arrived, he can be transferred there.    DVT Prophylaxis: Eliquis  Code Status: DNR/DNI, primarily comfort  Diet: As tolerated per speech therapy  Discharge Plan:  [facility] tomorrow    Iris Alas DO  08/07/21  14:18 EDT    Dictated utilizing Dragon dictation.

## 2021-08-07 NOTE — PLAN OF CARE
Goal Outcome Evaluation:  Plan of Care Reviewed With: patient        Progress: no change  Outcome Summary: VSS.  Pt called out frequently for staff c/o pain.  PRN meds given.  No changes in pt condition to report.  Will continue to monitor.

## 2021-08-07 NOTE — PLAN OF CARE
Problem: Fall Injury Risk  Goal: Absence of Fall and Fall-Related Injury  Outcome: Ongoing, Progressing  Intervention: Identify and Manage Contributors to Fall Injury Risk  Recent Flowsheet Documentation  Taken 8/7/2021 1200 by Chika Flores RN  Medication Review/Management: medications reviewed  Taken 8/7/2021 0815 by Chika Flores RN  Medication Review/Management: medications reviewed  Intervention: Promote Injury-Free Environment  Recent Flowsheet Documentation  Taken 8/7/2021 1400 by Chika Flores RN  Safety Promotion/Fall Prevention:   activity supervised   assistive device/personal items within reach   clutter free environment maintained   nonskid shoes/slippers when out of bed  Taken 8/7/2021 1200 by Chika Flores RN  Safety Promotion/Fall Prevention:   activity supervised   assistive device/personal items within reach   clutter free environment maintained   nonskid shoes/slippers when out of bed  Taken 8/7/2021 1000 by Chika Flores RN  Safety Promotion/Fall Prevention:   activity supervised   assistive device/personal items within reach   clutter free environment maintained   nonskid shoes/slippers when out of bed  Taken 8/7/2021 0815 by Chika Flores RN  Safety Promotion/Fall Prevention:   activity supervised   assistive device/personal items within reach   clutter free environment maintained   nonskid shoes/slippers when out of bed     Problem: Adult Inpatient Plan of Care  Goal: Plan of Care Review  Outcome: Ongoing, Progressing  Goal: Patient-Specific Goal (Individualized)  Outcome: Ongoing, Progressing  Goal: Absence of Hospital-Acquired Illness or Injury  Outcome: Ongoing, Progressing  Intervention: Identify and Manage Fall Risk  Recent Flowsheet Documentation  Taken 8/7/2021 1400 by Chika Flores RN  Safety Promotion/Fall Prevention:   activity supervised   assistive device/personal items within reach   clutter free environment maintained   nonskid shoes/slippers when out of bed  Taken 8/7/2021 1200 by  Hill, Chika, RN  Safety Promotion/Fall Prevention:   activity supervised   assistive device/personal items within reach   clutter free environment maintained   nonskid shoes/slippers when out of bed  Taken 8/7/2021 1000 by Chika Flores RN  Safety Promotion/Fall Prevention:   activity supervised   assistive device/personal items within reach   clutter free environment maintained   nonskid shoes/slippers when out of bed  Taken 8/7/2021 0815 by Chika Flores RN  Safety Promotion/Fall Prevention:   activity supervised   assistive device/personal items within reach   clutter free environment maintained   nonskid shoes/slippers when out of bed  Intervention: Prevent Skin Injury  Recent Flowsheet Documentation  Taken 8/7/2021 1400 by Chika Flores RN  Body Position: supine  Skin Protection: incontinence pads utilized  Taken 8/7/2021 1200 by Chika Flores RN  Body Position: supine  Skin Protection: incontinence pads utilized  Taken 8/7/2021 1000 by Chika Flores RN  Body Position: supine  Skin Protection: incontinence pads utilized  Taken 8/7/2021 0815 by Chika Flores RN  Body Position: weight shift assistance provided  Skin Protection: incontinence pads utilized  Intervention: Prevent and Manage VTE (venous thromboembolism) Risk  Recent Flowsheet Documentation  Taken 8/7/2021 0815 by Chiak Flores RN  VTE Prevention/Management: (see mar ) other (see comments)  Intervention: Prevent Infection  Recent Flowsheet Documentation  Taken 8/7/2021 1400 by Chika Flores RN  Infection Prevention: rest/sleep promoted  Taken 8/7/2021 1200 by Chika Flores RN  Infection Prevention: rest/sleep promoted  Taken 8/7/2021 1000 by Chika Flores RN  Infection Prevention: rest/sleep promoted  Taken 8/7/2021 0815 by Chika Flores RN  Infection Prevention: rest/sleep promoted  Goal: Optimal Comfort and Wellbeing  Outcome: Ongoing, Progressing  Intervention: Provide Person-Centered Care  Recent Flowsheet Documentation  Taken 8/7/2021 1200 by Chika Flores  RN  Trust Relationship/Rapport:   care explained   questions answered  Taken 8/7/2021 0815 by Chika Flores RN  Trust Relationship/Rapport:   care explained   questions answered  Goal: Readiness for Transition of Care  Outcome: Ongoing, Progressing  Goal: Plan of Care Review  Outcome: Ongoing, Progressing  Goal: Patient-Specific Goal (Individualized)  Outcome: Ongoing, Progressing  Goal: Absence of Hospital-Acquired Illness or Injury  Outcome: Ongoing, Progressing  Intervention: Identify and Manage Fall Risk  Recent Flowsheet Documentation  Taken 8/7/2021 1400 by Chika Flores RN  Safety Promotion/Fall Prevention:   activity supervised   assistive device/personal items within reach   clutter free environment maintained   nonskid shoes/slippers when out of bed  Taken 8/7/2021 1200 by Chika Flores RN  Safety Promotion/Fall Prevention:   activity supervised   assistive device/personal items within reach   clutter free environment maintained   nonskid shoes/slippers when out of bed  Taken 8/7/2021 1000 by Chika Flores RN  Safety Promotion/Fall Prevention:   activity supervised   assistive device/personal items within reach   clutter free environment maintained   nonskid shoes/slippers when out of bed  Taken 8/7/2021 0815 by Chika Flores RN  Safety Promotion/Fall Prevention:   activity supervised   assistive device/personal items within reach   clutter free environment maintained   nonskid shoes/slippers when out of bed  Intervention: Prevent Skin Injury  Recent Flowsheet Documentation  Taken 8/7/2021 1400 by Chika Flores RN  Body Position: supine  Skin Protection: incontinence pads utilized  Taken 8/7/2021 1200 by Chika Flores RN  Body Position: supine  Skin Protection: incontinence pads utilized  Taken 8/7/2021 1000 by Chika Flores RN  Body Position: supine  Skin Protection: incontinence pads utilized  Taken 8/7/2021 0815 by Chika Flores RN  Body Position: weight shift assistance provided  Skin Protection: incontinence  pads utilized  Intervention: Prevent and Manage VTE (venous thromboembolism) Risk  Recent Flowsheet Documentation  Taken 8/7/2021 0815 by Chika Flores RN  VTE Prevention/Management: (see mar ) other (see comments)  Intervention: Prevent Infection  Recent Flowsheet Documentation  Taken 8/7/2021 1400 by Chika Flores RN  Infection Prevention: rest/sleep promoted  Taken 8/7/2021 1200 by Chika Flores RN  Infection Prevention: rest/sleep promoted  Taken 8/7/2021 1000 by Chika Flores RN  Infection Prevention: rest/sleep promoted  Taken 8/7/2021 0815 by Chika Flores RN  Infection Prevention: rest/sleep promoted  Goal: Optimal Comfort and Wellbeing  Outcome: Ongoing, Progressing  Intervention: Provide Person-Centered Care  Recent Flowsheet Documentation  Taken 8/7/2021 1200 by Chika Flores RN  Trust Relationship/Rapport:   care explained   questions answered  Taken 8/7/2021 0815 by Chika Flores RN  Trust Relationship/Rapport:   care explained   questions answered  Goal: Readiness for Transition of Care  Outcome: Ongoing, Progressing     Problem: COPD Comorbidity  Goal: Maintenance of COPD Symptom Control  Outcome: Ongoing, Progressing  Intervention: Maintain COPD-Symptom Control  Recent Flowsheet Documentation  Taken 8/7/2021 1200 by Chika Flores RN  Medication Review/Management: medications reviewed  Taken 8/7/2021 0815 by Chika Flores RN  Medication Review/Management: medications reviewed     Problem: Diabetes Comorbidity  Goal: Blood Glucose Level Within Desired Range  Outcome: Ongoing, Progressing     Problem: Skin Injury Risk Increased  Goal: Skin Health and Integrity  Outcome: Ongoing, Progressing  Intervention: Optimize Skin Protection  Recent Flowsheet Documentation  Taken 8/7/2021 1400 by Chika Flores RN  Pressure Reduction Techniques: weight shift assistance provided  Head of Bed (HOB): HOB at 20-30 degrees  Pressure Reduction Devices: pressure-redistributing mattress utilized  Skin Protection: incontinence pads  utilized  Taken 8/7/2021 1200 by Chika Flores RN  Pressure Reduction Techniques: weight shift assistance provided  Head of Bed (HOB): HOB at 20-30 degrees  Pressure Reduction Devices: pressure-redistributing mattress utilized  Skin Protection: incontinence pads utilized  Taken 8/7/2021 1000 by Chika Flores RN  Pressure Reduction Techniques: weight shift assistance provided  Head of Bed (HOB): HOB at 20-30 degrees  Pressure Reduction Devices: pressure-redistributing mattress utilized  Skin Protection: incontinence pads utilized  Taken 8/7/2021 0815 by Chika Flores RN  Pressure Reduction Techniques: frequent weight shift encouraged  Head of Bed (HOB):   HOB elevated   HOB at 30 degrees  Pressure Reduction Devices: pressure-redistributing mattress utilized  Skin Protection: incontinence pads utilized  Intervention: Promote and Optimize Oral Intake  Recent Flowsheet Documentation  Taken 8/7/2021 0815 by Chika Flores RN  Oral Nutrition Promotion: rest periods promoted     Problem: Wound  Goal: Optimal Wound Healing  Outcome: Ongoing, Progressing  Intervention: Promote Effective Wound Healing  Recent Flowsheet Documentation  Taken 8/7/2021 1200 by Chika Flores RN  Sleep/Rest Enhancement:   noise level reduced   regular sleep/rest pattern promoted  Taken 8/7/2021 0815 by Chika Flores RN  Oral Nutrition Promotion: rest periods promoted  Sleep/Rest Enhancement:   noise level reduced   regular sleep/rest pattern promoted     Problem: Gas Exchange Impaired  Goal: Optimal Gas Exchange  Outcome: Ongoing, Progressing  Intervention: Optimize Oxygenation and Ventilation  Recent Flowsheet Documentation  Taken 8/7/2021 1400 by Chika Flores RN  Head of Bed (HOB): HOB at 20-30 degrees  Taken 8/7/2021 1200 by Chika Flores RN  Head of Bed (HOB): HOB at 20-30 degrees  Taken 8/7/2021 1000 by Chika Flores RN  Head of Bed (HOB): HOB at 20-30 degrees  Taken 8/7/2021 0815 by Chika Flores RN  Head of Bed (HOB):   HOB elevated   HOB at 30  degrees     Problem: Pain Acute  Goal: Optimal Pain Control  Outcome: Ongoing, Progressing  Intervention: Prevent or Manage Pain  Recent Flowsheet Documentation  Taken 8/7/2021 1200 by Chika Flores RN  Sleep/Rest Enhancement:   noise level reduced   regular sleep/rest pattern promoted  Taken 8/7/2021 0815 by Chika Flores RN  Sensory Stimulation Regulation:   care clustered   quiet environment promoted  Sleep/Rest Enhancement:   noise level reduced   regular sleep/rest pattern promoted  Intervention: Optimize Psychosocial Wellbeing  Recent Flowsheet Documentation  Taken 8/7/2021 1200 by Chika Flores RN  Supportive Measures:   active listening utilized   self-care encouraged  Diversional Activities: television  Taken 8/7/2021 0815 by Chika Flores RN  Supportive Measures:   active listening utilized   self-care encouraged  Diversional Activities: television     Problem: Acute Confusion (Hospitalized Older Adult)  Goal: Optimal Cognitive Function  Outcome: Ongoing, Progressing  Intervention: Minimize Factors Contributing to Confusion  Recent Flowsheet Documentation  Taken 8/7/2021 1200 by Chika Flores RN  Environment Familiarity/Consistency: daily routine followed  Taken 8/7/2021 0815 by Chika Flores RN  Sensory Stimulation Regulation:   care clustered   quiet environment promoted  Environment Familiarity/Consistency: daily routine followed     Problem: Bowel Elimination Impaired (Hospitalized Older Adult)  Goal: Effective Bowel Elimination  Outcome: Ongoing, Progressing     Problem: Fluid Imbalance (Hospitalized Older Adult)  Goal: Fluid Balance  Outcome: Ongoing, Progressing     Problem: Functional Ability Impaired (Hospitalized Older Adult)  Goal: Optimal Functional Ability  Outcome: Ongoing, Progressing  Intervention: Promote Functional Ability  Recent Flowsheet Documentation  Taken 8/7/2021 1400 by Chika Flores RN  Activity Assistance Provided: assistance, 2 people  Taken 8/7/2021 1200 by Chika Flores RN  Activity  Assistance Provided: assistance, 2 people  Taken 8/7/2021 1000 by Chika Flores RN  Activity Assistance Provided: assistance, 2 people  Taken 8/7/2021 0815 by Chika Flores RN  Activity Assistance Provided: assistance, 2 people     Problem: Oral Intake Inadequate (Hospitalized Older Adult)  Goal: Optimal Nutrition Intake  Outcome: Ongoing, Progressing  Intervention: Promote and Optimize Oral Intake  Recent Flowsheet Documentation  Taken 8/7/2021 0815 by Chika Flores RN  Oral Nutrition Promotion: rest periods promoted     Problem: Sleep Disturbance (Hospitalized Older Adult)  Goal: Adequate Sleep/Rest  Outcome: Ongoing, Progressing  Intervention: Promote Sleep/Rest  Recent Flowsheet Documentation  Taken 8/7/2021 1200 by Chika Flores RN  Sleep/Rest Enhancement:   noise level reduced   regular sleep/rest pattern promoted  Taken 8/7/2021 0815 by Chika Flores RN  Sleep/Rest Enhancement:   noise level reduced   regular sleep/rest pattern promoted     Problem: Urinary Incontinence (Hospitalized Older Adult)  Goal: Urinary Incontinence Managed  Outcome: Ongoing, Progressing     Problem: Fluid Imbalance (Pneumonia)  Goal: Fluid Balance  Outcome: Ongoing, Progressing     Problem: Infection (Pneumonia)  Goal: Resolution of Infection Signs and Symptoms  Outcome: Ongoing, Progressing  Intervention: Prevent Infection Progression  Recent Flowsheet Documentation  Taken 8/7/2021 1400 by Chika Flores RN  Infection Management: aseptic technique maintained  Isolation Precautions: contact precautions maintained  Taken 8/7/2021 1200 by Chika Flores RN  Infection Management: aseptic technique maintained  Isolation Precautions: contact precautions maintained  Taken 8/7/2021 1000 by Chika Flores RN  Infection Management: aseptic technique maintained  Isolation Precautions: contact precautions maintained  Taken 8/7/2021 0815 by Chika Flores RN  Infection Management: aseptic technique maintained  Isolation Precautions: contact precautions  maintained     Problem: Respiratory Compromise (Pneumonia)  Goal: Effective Oxygenation and Ventilation  Outcome: Ongoing, Progressing  Intervention: Promote Airway Secretion Clearance  Recent Flowsheet Documentation  Taken 8/7/2021 1200 by Chika Flores RN  Cough And Deep Breathing: done independently per patient  Taken 8/7/2021 0815 by Chika Flores RN  Cough And Deep Breathing: done independently per patient  Intervention: Optimize Oxygenation and Ventilation  Recent Flowsheet Documentation  Taken 8/7/2021 1400 by Chika Flores RN  Head of Bed (HOB): HOB at 20-30 degrees  Taken 8/7/2021 1200 by Chika Flores RN  Head of Bed (HOB): HOB at 20-30 degrees  Taken 8/7/2021 1000 by Chika Flores RN  Head of Bed (HOB): HOB at 20-30 degrees  Taken 8/7/2021 0815 by Chika Flores RN  Head of Bed (HOB):   HOB elevated   HOB at 30 degrees   Goal Outcome Evaluation:

## 2021-08-07 NOTE — PROGRESS NOTES
[facility] Progress Note      Patient Name: Jani Pena  : 1950  MRN: [id number]  Primary Care Physician:  Miguel Rojas MD  Date of admission: 2021    Subjective     Interval History:   Follow-up acute kidney injury on chronic kidney disease  The patient is feeling much better he had still difficulty periodically with shortness of breath his edema has improved, denies chest pain, no nausea or vomiting, he has Tinajero catheter, his diuretics were decreased yesterday    Review of Systems:   As noted above    Objective     Vitals:   Temp:  [97.3 °F (36.3 °C)-98.8 °F (37.1 °C)] 98.2 °F (36.8 °C)  Heart Rate:  [53-71] 69  Resp:  [18] 18  BP: (100-120)/(47-59) 120/47  Flow (L/min):  [6] 6    Intake/Output Summary (Last 24 hours) at 2021 0957  Last data filed at 2021 0942  Gross per 24 hour   Intake 400 ml   Output 450 ml   Net -50 ml       Physical Exam:    General Appearance: Appears to be chronically ill, awake, alert, no acute distress  Skin: warm and dry  HEENT: oral mucosa dry , nonicteric sclera  Neck: supple, mild  Lungs: Bilateral rhonchi, unlabored breathing effort  Heart: Irregularly irregular, no rub  Abdomen: soft, nontender, nondistended, normoactive bowel  : no palpable bladder, Tinajero catheter anchored in place  Extremities: 1+ pedal edema, no cyanosis or clubbing  Neuro: Moving all extremities    Scheduled Meds:     apixaban, 5 mg, Oral, Q12H  aspirin, 81 mg, Oral, Daily  atorvastatin, 10 mg, Oral, Q PM  bisacodyl, 10 mg, Rectal, Daily  bisoprolol, 10 mg, Oral, Q24H  carboxymethylcellulose, 1 drop, Both Eyes, TID  clopidogrel, 75 mg, Oral, Daily  escitalopram, 10 mg, Oral, Daily  finasteride, 5 mg, Oral, Daily  hydrALAZINE, 10 mg, Oral, Q8H  ipratropium-albuterol, 3 mL, Nebulization, Q6H - RT  iron polysaccharides, 150 mg, Oral, Daily  isosorbide dinitrate, 10 mg, Oral, TID - Nitrates  lactobacillus acidophilus, 1 capsule, Oral, BID  mupirocin, ,  Topical, Q12H  pantoprazole, 40 mg, Oral, Daily  polyethylene glycol, 17 g, Oral, Daily  senna-docusate sodium, 1 tablet, Oral, BID  sodium chloride, 10 mL, Intravenous, Q12H  tamsulosin, 0.4 mg, Oral, Nightly  torsemide, 40 mg, Oral, Daily      IV Meds:        Results Reviewed:   I have personally reviewed the results from the time of this admission to 8/7/2021 09:57 EDT     Results from last 7 days   Lab Units 08/06/21  0520 08/05/21  0557 08/04/21  0554   SODIUM mmol/L 142 145 143   POTASSIUM mmol/L 3.8 3.6 3.6   CHLORIDE mmol/L 95* 94* 96*   CO2 mmol/L 35.2* 31.9* 32.7*   BUN mg/dL 74* 80* 88*   CREATININE mg/dL 2.28* 2.45* 3.27*   CALCIUM mg/dL 8.4* 8.7 8.5*   BILIRUBIN mg/dL 0.7 0.6 0.6   ALK PHOS U/L 78 83 86   ALT (SGPT) U/L 12 14 15   AST (SGOT) U/L 24 37 44*   GLUCOSE mg/dL 100* 68 75       Estimated Creatinine Clearance: 33.5 mL/min (A) (by C-G formula based on SCr of 2.28 mg/dL (H)).    Results from last 7 days   Lab Units 08/02/21  0745 08/01/21  0558   MAGNESIUM mg/dL 3.0* 3.0*   PHOSPHORUS mg/dL 4.7* 6.2*       Results from last 7 days   Lab Units 08/02/21  0745 08/01/21  0558   URIC ACID mg/dL 10.8* 11.4*       Results from last 7 days   Lab Units 08/06/21  0520 08/05/21  0557 08/04/21  0554 08/03/21  0506 08/02/21  0745   WBC 10*3/mm3 9.17 10.35 11.66* 11.76* 14.80*   HEMOGLOBIN g/dL 9.1* 7.8* 7.7* 7.4* 8.2*   PLATELETS 10*3/mm3 195 218 253 249 259       Results from last 7 days   Lab Units 08/01/21  1544   INR  1.19*       Assessment / Plan     ASSESSMENT:  1.  Acute kidney injury associated with hemodynamic changes could be related to cardiorenal syndrome, his creatinine is slowly improving down to 2.28, his electrolyte within acceptable range and his total CO2 35.2,   2.  Coronary artery disease with prior CABG  3.  Cardiomyopathy ejection fraction 25 to 30% with AICD in situ  4.  Anemia, with mild iron deficiency hemoglobin yesterday was seven-point  5.  Acute respiratory failure appears to  be  6.  Gram-positive sepsis  7.  Hyperuricemia associated with acute kidney injury decreased effective intravascular volume last uric acid on 8/2/2021 was down to 10.8  8.  Metabolic alkalosis probably associated with diuretics      PLAN:  1.  Continue the same treatment and same dose of diuretics  2.  Reevaluate volume status and electrolyte and decide if diuretics need to be adjusted further in the next 24 hours  3.  Surveillance labs    I discussed the case with the patient    Thank you for involving us in the care of Jani Pena.  Please feel free to call with any questions.    Jerry Escalona MD  08/07/21  09:57 EDT    [facility]  [phone]      Much of this encounter note is an electronic transcription/translation of spoken language to printed text. The electronic translation of spoken language may permit erroneous, or at times, nonsensical words or phrases to be inadvertently transcribed; Although I have reviewed the note for such errors, some may still exist.

## 2021-08-08 NOTE — PLAN OF CARE
Goal Outcome Evaluation:  Plan of Care Reviewed With: patient        Progress: no change  Outcome Summary: VSS.  Pt rested comfortably during shift.  Pt called out frequently for staff.  PRN meds were administered for pain, anxiety and low grade fever.  No other changes in pt condition to report.  Will continue to monitor.

## 2021-08-08 NOTE — CASE MANAGEMENT/SOCIAL WORK
Case Management Discharge Note      Final Note: discharged  to [facility]         Selected Continued Care - Admitted Since 7/28/2021     Destination Coordination complete.    Service Provider Selected Services Address Phone Fax Patient Preferred    [facility]  Inpatient Hospice [address] [address] [phone] --          Durable Medical Equipment    No services have been selected for the patient.              Dialysis/Infusion    No services have been selected for the patient.              Home Medical Care    No services have been selected for the patient.              Therapy    No services have been selected for the patient.              Community Resources    No services have been selected for the patient.              Community & DME    No services have been selected for the patient.                  Transportation Services  Ambulance: [facility]    Final Discharge Disposition Code: 51 - hospice medical facility

## 2021-08-08 NOTE — PROGRESS NOTES
[facility] Progress Note      Patient Name: Jani Pena  : 1950  MRN: [id number]  Primary Care Physician:  Miguel Rojas MD  Date of admission: 2021    Subjective     Interval History:   Follow-up acute kidney injury on chronic kidney disease  The patient is obtunded he has worsening renal function and worsening heart failure    Review of Systems:   Not obtainable    Objective     Vitals:   Temp:  [97.7 °F (36.5 °C)-100.9 °F (38.3 °C)] 97.9 °F (36.6 °C)  Heart Rate:  [50-73] 51  Resp:  [16-20] 20  BP: ()/(40-55) 93/40  Flow (L/min):  [6] 6    Intake/Output Summary (Last 24 hours) at 2021 1112  Last data filed at 2021 0800  Gross per 24 hour   Intake 60 ml   Output 275 ml   Net -215 ml       Physical Exam:    General Appearance: Chronically ill, obtunded, no acute distress  Skin: warm and dry  HEENT: oral mucosa dry , nonicteric sclera  Neck: Increased JVD  Lungs: Bilateral rhonchi, unlabored breathing effort  Heart: Irregularly irregular, no rub  Abdomen: soft, nontender, nondistended, normoactive bowel  : no palpable bladder, Tinajero catheter anchored in place  Extremities: 1+ pedal edema, no cyanosis or clubbing  Neuro: Unable to assess    Scheduled Meds:     apixaban, 5 mg, Oral, Q12H  aspirin, 81 mg, Oral, Daily  atorvastatin, 10 mg, Oral, Q PM  bisacodyl, 10 mg, Rectal, Daily  bisoprolol, 10 mg, Oral, Q24H  carboxymethylcellulose, 1 drop, Both Eyes, TID  clopidogrel, 75 mg, Oral, Daily  escitalopram, 10 mg, Oral, Daily  finasteride, 5 mg, Oral, Daily  hydrALAZINE, 10 mg, Oral, Q8H  ipratropium-albuterol, 3 mL, Nebulization, Q6H - RT  iron polysaccharides, 150 mg, Oral, Daily  isosorbide dinitrate, 10 mg, Oral, TID - Nitrates  lactobacillus acidophilus, 1 capsule, Oral, BID  mupirocin, , Topical, Q12H  pantoprazole, 40 mg, Oral, Daily  polyethylene glycol, 17 g, Oral, Daily  senna-docusate sodium, 1 tablet, Oral, BID  sodium chloride, 10 mL,  Intravenous, Q12H  tamsulosin, 0.4 mg, Oral, Nightly      IV Meds:        Results Reviewed:   I have personally reviewed the results from the time of this admission to 8/8/2021 11:12 EDT     Results from last 7 days   Lab Units 08/08/21  0549 08/06/21  0520 08/05/21  0557 08/04/21  0554 08/04/21  0554   SODIUM mmol/L 139 142 145   < > 143   POTASSIUM mmol/L 4.0 3.8 3.6   < > 3.6   CHLORIDE mmol/L 92* 95* 94*   < > 96*   CO2 mmol/L 34.2* 35.2* 31.9*   < > 32.7*   BUN mg/dL 92* 74* 80*   < > 88*   CREATININE mg/dL 3.81* 2.28* 2.45*   < > 3.27*   CALCIUM mg/dL 8.2* 8.4* 8.7   < > 8.5*   BILIRUBIN mg/dL  --  0.7 0.6  --  0.6   ALK PHOS U/L  --  78 83  --  86   ALT (SGPT) U/L  --  12 14  --  15   AST (SGOT) U/L  --  24 37  --  44*   GLUCOSE mg/dL 121* 100* 68   < > 75    < > = values in this interval not displayed.       Estimated Creatinine Clearance: 20 mL/min (A) (by C-G formula based on SCr of 3.81 mg/dL (H)).    Results from last 7 days   Lab Units 08/08/21  0549 08/02/21  0745   MAGNESIUM mg/dL 2.6* 3.0*   PHOSPHORUS mg/dL 5.1* 4.7*       Results from last 7 days   Lab Units 08/08/21  0549 08/02/21  0745   URIC ACID mg/dL 17.4* 10.8*       Results from last 7 days   Lab Units 08/06/21  0520 08/05/21  0557 08/04/21  0554 08/03/21  0506 08/02/21  0745   WBC 10*3/mm3 9.17 10.35 11.66* 11.76* 14.80*   HEMOGLOBIN g/dL 9.1* 7.8* 7.7* 7.4* 8.2*   PLATELETS 10*3/mm3 195 218 253 249 259       Results from last 7 days   Lab Units 08/01/21  1544   INR  1.19*       Assessment / Plan     ASSESSMENT:  1.  Acute kidney injury associated with hemodynamic changes and cardiorenal syndrome much worsened   2.  Coronary artery disease with prior CABG  3.  Cardiomyopathy ejection fraction 25 to 30% with AICD in situ, severe cardiorenal syndrome  4.  Anemia, with mild iron deficiency hemoglobin yesterday was seven-point  5.  Acute respiratory failure appears to be  6.  Gram-positive sepsis  7.  Hyperuricemia associated with acute kidney  injury decreased effective intravascular volume related to the severe heart failure uric acid up to 17.4.      PLAN:  The patient is moribund  Prognosis is extremely poor  I discussed the case with the patient's daughter at the bedside and definitely the patient need palliative care and I agree with the plan for palliative care    Thank you for involving us in the care of Jani Pena.  Please feel free to call with any questions.    Jerry Escalona MD  08/08/21  11:12 EDT    [facility]  [phone]      Much of this encounter note is an electronic transcription/translation of spoken language to printed text. The electronic translation of spoken language may permit erroneous, or at times, nonsensical words or phrases to be inadvertently transcribed; Although I have reviewed the note for such errors, some may still exist.

## 2021-08-08 NOTE — DISCHARGE SUMMARY
[facility]   DISCHARGE SUMMARY      Name:  Jani Pena   Age:  71 y.o.  Sex:  male  :  1950  MRN:  [id number]   Visit Number:  [id number]    Admission Date:  2021  Date of Discharge:  2021  Primary Care Physician:  Miguel Rojas MD    Important issues to note:    1.  Patient is currently being transferred to inpatient hospice care center for comfort measures.  2.  Patient's medications may be changed or discontinued at the hospice care facility as there is no benefit in continuation of guideline directed therapy for his heart failure.    Discharge Diagnoses:     1. Acute on chronic respiratory failure with hypoxia and hypercapnia, POA.  2. Sepsis with MRSA bacteremia likely secondary to #3, POA.  3. Tricuspid valve endocarditis, POA.  4. Bilateral bacterial pneumonia, likely MRSA, POA.  5. Cholecystitis ruled out, POA.  6. Acute renal failure on chronic kidney disease stage III, POA.  7. Acute on chronic systolic heart failure with ejection fraction of 25%  8. Coronary artery disease status post CABG.  9. Ischemic cardiomyopathy.  10. Sick sinus syndrome status post pacemaker.  11. Anemia of chronic kidney disease.    Problem List:     Active Hospital Problems    Diagnosis  POA   • **Pneumonia of both lower lobes due to methicillin resistant Staphylococcus aureus (MRSA) (CMS/[facility]) [J15.212]  Yes   • MRSA bacteremia [R78.81, B95.62]  Yes   • Calculus of gallbladder without cholecystitis without obstruction [K80.20]  Yes   • Acute respiratory failure with hypercapnia (CMS/[facility]) [J96.02]  Yes   • Other dysphagia [R13.19]  Yes   • Sick sinus syndrome (CMS/HCC) [I49.5]  Yes   • Anemia [D64.9]  Yes   • Chronic diastolic congestive heart failure (CMS/HCC) [I50.32]  Yes   • Depression [F32.9]  Yes   • Atrial fibrillation (CMS/HCC) [I48.91]  Yes   • Steroid-dependent COPD (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     Presenting Problem:    Chief Complaint   Patient presents with   • Shortness of Breath      Consults:     Consulting Physician(s)  Chat With All Active Members    Provider Relationship Specialty    Markel Owens MD, ERIKA  Consulting Physician Nephrology    Jose Allen MD  Consulting Physician Cardiology    Janice Garcia MD  Consulting Physician Internal Medicine    Valdemar Mcdonald MD  Consulting Physician General Surgery        Procedures Performed:    None.    History of presenting illness/Hospital Course:    Mr. Pena is a 71-year-old gentleman with chronic comorbidities including prior CABG, diastolic congestive heart failure, CAD, severe COPD with chronic hypoxic/hypercapnic respiratory failure, chronic kidney disease stage III, who presented from assisted living facility due to abdominal pain and shortness of breath with cough.  Chest x-ray showed atelectasis.  Patient was given IV antibiotics therapy, IV Lasix and morphine in the emergency room.  CT scan abdomen and pelvis without contrast with findings concerning for acute cholecystitis.  Patient was also noted to have worsening creatinine level from his baseline.     Patient was admitted to the medical floor and was placed on IV antibiotics therapy with Zosyn.  Dr. Donovan from nephrology was consulted and he recommended IV diuretic therapy with Bumex.  He was seen by Dr. Mcdonald from surgical services who did not feel that the patient had clinical evidence of cholecystitis and recommended HIDA scan.  He also felt that the patient would be high surgical risk candidate for any open gallbladder surgery.     Both of the patient's blood cultures done in the emergency room came back positive for MRSA and he was placed on vancomycin.  Patient had a HIDA scan and it showed normal filling of the gallbladder not suggestive of cholecystitis.  Patient was seen  by Dr. Maldonado and he recommended no further interventions for his gallbladder at this time.  Patient was noted to have nonsustained ventricular tachycardia on telemetry and Dr. Allen from cardiology was consulted.  Patient was placed on guideline directed therapy for heart failure with Imdur and hydralazine but he could not be placed on ACE/ARB therapy or spironolactone due to renal failure.  Unfortunately despite medical therapy and fluids, patient's renal function did not improve and continued to get worse.  He continued to have persistent MRSA bacteremia despite being on IV antibiotics therapy.  His 2D echocardiogram showed tricuspid valve vegetation suggestive of tricuspid valve endocarditis.  Patient also had nonsustained ventricular tachycardia due to his cardiomyopathy.  Unfortunately, due to his multiple comorbidities, advanced heart and kidney failure as well as infective endocarditis, it was felt that the patient would not be a candidate for any procedures or surgical interventions or dialysis.  Palliative care/hospice care were consulted and after multiple discussions with the patient's family patient was felt appropriate for comfort measures and transition to inpatient hospice care.  Patient was seen by Dr. Garcia and is currently being transitioned to [facility].  I have discussed the patient's condition and transfer plans with the family who is at the bedside.  Patient is currently denying any pain and seems to be comfortable.  I have discussed the patient's condition and treatment plan with Dr. Escalona from nephrology services as well.  Discussed with Dr. Garcia as well.    Vital Signs:    Temp:  [97.7 °F (36.5 °C)-100.9 °F (38.3 °C)] 97.9 °F (36.6 °C)  Heart Rate:  [45-72] 45  Resp:  [16-20] 18  BP: ()/(40-55) 102/55    Physical Exam:    General Appearance:  Alert and cooperative.   Head:  Atraumatic and normocephalic.   Eyes: Conjunctivae and sclerae normal, no icterus. No  pallor.   Ears:  Ears with no abnormalities noted.   Throat: No oral lesions, no thrush, oral mucosa moist.   Neck: Supple, trachea midline, no thyromegaly.   Back:   No kyphoscoliosis present. No tenderness to palpation.   Lungs:   Breath sounds heard bilaterally equally.  No wheezing.  Bilateral scattered crackles heard.  No Pleural rub or bronchial breathing.   Heart:  Normal S1 and S2, no murmur, no gallop, no rub. No JVD.  Pacemaker is palpated on the right upper chest.   Abdomen:   Normal bowel sounds, no masses, no organomegaly. Soft, diffuse minimal tenderness noted, nondistended, no rebound tenderness.   Extremities: Supple, no edema, no cyanosis, no clubbing.   Pulses: Pulses palpable bilaterally.   Skin: No bleeding or rash.  Dry skin noted with multiple ecchymotic patches in both upper extremities.   Neurologic: Alert and oriented x 3. No facial asymmetry. Moves all four limbs but does have generalized weakness. No tremors.     Pertinent Lab Results:     Results from last 7 days   Lab Units 08/08/21  0549 08/06/21  0520 08/05/21  0557 08/04/21  0554 08/04/21  0554   SODIUM mmol/L 139 142 145   < > 143   POTASSIUM mmol/L 4.0 3.8 3.6   < > 3.6   CHLORIDE mmol/L 92* 95* 94*   < > 96*   CO2 mmol/L 34.2* 35.2* 31.9*   < > 32.7*   BUN mg/dL 92* 74* 80*   < > 88*   CREATININE mg/dL 3.81* 2.28* 2.45*   < > 3.27*   CALCIUM mg/dL 8.2* 8.4* 8.7   < > 8.5*   BILIRUBIN mg/dL  --  0.7 0.6  --  0.6   ALK PHOS U/L  --  78 83  --  86   ALT (SGPT) U/L  --  12 14  --  15   AST (SGOT) U/L  --  24 37  --  44*   GLUCOSE mg/dL 121* 100* 68   < > 75    < > = values in this interval not displayed.     Results from last 7 days   Lab Units 08/06/21  0520 08/05/21  0557 08/04/21  0554   WBC 10*3/mm3 9.17 10.35 11.66*   HEMOGLOBIN g/dL 9.1* 7.8* 7.7*   HEMATOCRIT % 29.7* 26.3* 26.3*   PLATELETS 10*3/mm3 195 218 253     Results from last 7 days   Lab Units 08/01/21  1544   INR  1.19*       Results from last 7 days   Lab Units  08/04/21  1420 08/04/21  1414 08/02/21  0745 08/01/21  2310   BLOODCX  Staphylococcus aureus, MRSA* Staphylococcus aureus, MRSA* Staphylococcus aureus, MRSA* Staphylococcus aureus, MRSA*     Pertinent Radiology Results:    Imaging Results (All)     Procedure Component Value Units Date/Time    XR Chest 1 View [[id number]] Collected: 08/08/21 0948     Updated: 08/08/21 0951    Narrative:      PROCEDURE: XR CHEST 1 VW-     HISTORY: cardiomyopathy; J96.02-Acute respiratory failure with  hypercapnia; J18.9-Pneumonia, unspecified organism; U43-Spxiuwsttg  condition; N17.9-Acute kidney failure, unspecified; I50.9-Heart failure,  unspecified     COMPARISON: 08/03/2021.     FINDINGS:  Cardiomegaly is noted. There is a left subclavian pacer. A  presumed stimulator overlies the right thorax. There is persistent  pulmonary vascular congestion. There are bilateral pulmonary opacities  which may represent edema or possibly pneumonia. There is a small right  pleural effusion. There is no pneumothorax. The bony thorax in intact.       Impression:      Persistent pulmonary vascular congestion.     Persistent bilateral pulmonary opacities that may represent edema or  possibly pneumonia..     This report was finalized on 8/8/2021 9:49 AM by Jorge Luis Hanna MD.    XR Chest 1 View [[id number]] Collected: 08/03/21 1207     Updated: 08/03/21 1209    Narrative:      PROCEDURE: XR CHEST 1 VW-     HISTORY: Left lower back pain; J96.02-Acute respiratory failure with  hypercapnia; J18.9-Pneumonia, unspecified organism; W22-Veaobgypzk  condition; N17.9-Acute kidney failure, unspecified; I50.9-Heart failure,  unspecified     COMPARISON: 07/28/2021.     FINDINGS: A left subclavian pacemaker is in place. The heart is  enlarged. The mediastinum is unremarkable. There is interstitial  pulmonary edema with small effusions. There is no pneumothorax.  There  are no acute osseous abnormalities.       Impression:      Cardiomegaly with interstitial edema  and small effusions.     Continued followup is recommended.     This report was finalized on 8/3/2021 12:07 PM by Richelle Patel M.D..    NM Hepatobiliary Without CCK [[id number]] Collected: 07/30/21 1602     Updated: 07/30/21 1605    Narrative:      PROCEDURE: NM HEPATOBILIARY WITHOUT CCK-     HISTORY: Biliary colic, recurrent, gallbladder dyskinesia suspected;  J96.02-Acute respiratory failure with hypercapnia; J18.9-Pneumonia,  unspecified organism; T96-Ibozhnrysc condition; N17.9-Acute kidney  failure, unspecified; I50.9-Heart failure, unspecified     Technique: Following intravenous administration of approximately 5.5 mCi  of TC Choletec, multiple scintigraphic images were obtained.     FINDINGS: The hepatic parenchymal phase shows homogeneous distribution  of tracer throughout the liver. Prompt appearance of tracer is noted in  the intrahepatic and extrahepatic biliary systems. The gallbladder  visualizes at 50 minutes. Biliary to bowel transit is within normal  limits.       Impression:      Normal visualization of activity within biliary tree,  gallbladder and drainage into small bowel. No findings to indicate  cystic duct obstruction.     This report was finalized on 7/30/2021 4:03 PM by Terrance Cheema MD.    US Gallbladder [[id number]] Collected: 07/29/21 0910     Updated: 07/29/21 0914    Narrative:      PROCEDURE: US GALLBLADDER-     HISTORY: abdominal pain, gallstones; J96.02-Acute respiratory failure  with hypercapnia; J18.9-Pneumonia, unspecified organism; G66-Expyuvoqrz  condition; N17.9-Acute kidney failure, unspecified; I50.9-Heart failure,  unspecified     FINDINGS: A 9 mm stone is noted in the gallbladder neck. Mild  gallbladder distention is seen without wall thickening. There is no  surrounding fluid collection.      Borderline extrahepatic biliary ductal dilatation is present. Common  hepatic duct measures 8 mm. Distal common bile duct appears normal in  caliber. No free fluid is seen.  Limited portions of the right kidney and  right liver are unremarkable.       Impression:      At least one gallstone located in the neck the gallbladder  with gallbladder distention. Acute cholecystitis not excluded. If there  is concern for acute cholecystitis, hepatobiliary imaging may be  considered..        This report was finalized on 7/29/2021 9:12 AM by Terrance Cheema MD.    CT Abdomen Pelvis Without Contrast [[id number]] Collected: 07/28/21 2047     Updated: 07/28/21 2048    Narrative:      FINAL REPORT    TECHNIQUE:  Axial CT images were performed from the lung bases through the  symphysis pubis without IV contrast.  This study was performed  with techniques to keep radiation doses as low as reasonably  achievable (ALARA). Individualized dose reduction techniques  using automated exposure control or adjustment of mA and/or kV  according to the patient's size were employed.    CLINICAL HISTORY:  abd pain    FINDINGS:  Lack of intravenous contrast limits evaluation of solid  abdominal and pelvic organs.  LOWER CHEST: There is  cardiomegaly.  The lung bases are clear.  ABDOMEN/PELVIS:  Liver, gallbladder and bile ducts: The unenhanced liver is  grossly unremarkable without focal abnormality.  The gallbladder  appears to be moderately distended and there appears to be  gallstones in the gallbladder neck.  There is no definite  biliary duct dilatation.  Adrenal glands: The adrenal glands are  morphologically unremarkable without suspicious lesion.  Kidneys, ureter and urinary bladder: No nephrolithiasis.  No  hydronephrosis.  Urinary bladder is unremarkable.  Spleen: The  spleen is normal size.  Pancreas: The pancreas is grossly  unremarkable.  GI systems and mesentery: No evidence of bowel  obstruction.  The appendix is visualized and unremarkable in  appearance.  No significant mesenteric inflammation.  Lymph  nodes: No definite pathologically enlarged abdominal or pelvic  lymph nodes present within the limits  of this noncontrast  enhanced exam.  Vessels: The abdominal aorta is normal in  caliber.  The inferior vena cava is unremarkable.  Peritoneum:  No free intraperitoneal fluid or pneumoperitoneum.  Pelvic  viscera: No acute findings.  Body wall: No body wall contusion.  Bones: No acute fracture.      Impression:      Findings are concerning for acute cholecystitis, recommend right  upper quadrant ultrasound.    Authenticated by Loyd Cole MD on 07/28/2021 08:47:43 PM    XR Chest 1 View [[id number]] Collected: 07/28/21 1520     Updated: 07/28/21 1548    Narrative:      PROCEDURE: XR CHEST 1 VW-     HISTORY: CHF/COPD Protocol     COMPARISON:  07/24/2021     FINDINGS:  Portable view of the chest demonstrates left lower lobe  density likely due to atelectasis. Mild increased atelectasis is seen of  the right lung base. There is no pulmonary edema. Minimal left pleural  effusion is suspected.     Left subclavian pacer device is noted. The mediastinum is unremarkable.     The heart size is normal.       Impression:      Worsening of atelectasis. No evidence of pulmonary edema.      This report was finalized on 7/28/2021 3:46 PM by Terrance Cheema MD.        Echo:    Results for orders placed during the hospital encounter of 07/28/21    Adult Transthoracic Echo Complete W/ Cont if Necessary Per Protocol    Interpretation Summary  1.  Moderate left ventricular dilation with severely reduced LV systolic function, LVEF 25-30%.  2.  Normal right ventricular size and systolic function by TAPSE.  3.  Severely increased left atrial volume index.  4.  Mild mitral regurgitation.  5.  Moderate calcification of aortic valve without significant stenosis.  6.  Mild aortic regurgitation.  7.  Mild tricuspid regurgitation, RVSP 50 mmHg.  8.  1.57 x 1.06 cm mobile echodensity attached to tricuspid valve suspicious for vegetation.  9.  Left pleural effusion.    Condition on Discharge:      Stable.    Code status during the hospital  stay:    Code Status and Medical Interventions:   Ordered at: 07/28/21 1851     Limited Support to NOT Include:    Intubation     Code Status:    No CPR     Medical Interventions (Level of Support Prior to Arrest):    Limited     Discharge Disposition:    Hospice/Medical Facility (DC - External)    Discharge Medications:       Discharge Medications      New Medications      Instructions Start Date   bisoprolol 10 MG tablet  Commonly known as: ZEBeta   10 mg, Oral, Every 24 Hours Scheduled   Start Date: August 9, 2021     isosorbide dinitrate 10 MG tablet  Commonly known as: ISORDIL   10 mg, Oral, 3 Times Daily (Nitrates)         Continue These Medications      Instructions Start Date   Acetaminophen Extra Strength 500 MG tablet  Generic drug: acetaminophen   TAKE 1 TABLET BY MOUTH EVERY 6 HOURS AS NEEDED FOR MILD PAIN      alfuzosin 10 MG 24 hr tablet  Commonly known as: UROXATRAL   @@ TAKE ONE TABLET BY MOUTH DAILY      ALPRAZolam 0.25 MG tablet  Commonly known as: XANAX   0.25 mg, Oral, 2 Times Daily PRN      Aspirin Low Dose 81 MG chewable tablet  Generic drug: aspirin   @@ TAKE ONE TABLET BY MOUTH DAILY      benzonatate 200 MG capsule  Commonly known as: TESSALON   200 mg, Oral, 3 Times Daily PRN      carboxymethylcellulose 0.5 % solution  Commonly known as: REFRESH PLUS   1 drop, Both Eyes, 3 Times Daily PRN      clopidogrel 75 MG tablet  Commonly known as: PLAVIX   TAKE ONE TABLET BY MOUTH DAILY      cyclobenzaprine 5 MG tablet  Commonly known as: FLEXERIL   5 mg, Oral, 3 Times Daily PRN      diphenhydrAMINE-zinc acetate 2-0.1 % cream   Topical, 3 Times Daily PRN      escitalopram 20 MG tablet  Commonly known as: LEXAPRO   @@ TAKE ONE TABLET BY MOUTH DAILY      famotidine 40 MG tablet  Commonly known as: PEPCID   TAKE ONE TABLET BY MOUTH EVERY DAY      fluocinonide 0.05 % cream  Commonly known as: LIDEX   Topical, 2 Times Daily      HYDROcodone-acetaminophen 5-325 MG per tablet  Commonly known as: NORCO   1  tablet, Oral, Every 8 Hours PRN      hydrocortisone 1 % cream   1 application, Topical, 2 Times Daily      ipratropium-albuterol 0.5-2.5 mg/3 ml nebulizer  Commonly known as: DUO-NEB   3 mL, Nebulization, Every 4 Hours PRN      lidocaine 5 %  Commonly known as: LIDODERM   1 patch, Transdermal, Every 24 Hours, Remove & Discard patch within 12 hours or as directed by MD      loperamide 2 MG capsule  Commonly known as: IMODIUM   2 mg, Oral, 4 Times Daily PRN      magnesium citrate 1.745 GM/30ML solution solution   15 mL, Oral, Daily PRN      melatonin 5 MG tablet tablet   @@ TAKE 1 TABLET BY MOUTH EVERY EVENING      mirtazapine 15 MG tablet  Commonly known as: REMERON   TAKE 1 TABLET BY MOUTH DAILY AT BEDTIME      oxybutynin 5 MG tablet  Commonly known as: DITROPAN   TAKE 1 TABLET BY MOUTH DAILY AT BEDTIME      pantoprazole 40 MG EC tablet  Commonly known as: PROTONIX   TAKE ONE TABLET BY MOUTH DAILY      polyethylene glycol 17 g packet  Commonly known as: MIRALAX   17 g, Oral, 2 Times Daily      predniSONE 10 MG tablet  Commonly known as: DELTASONE   TAKE ONE TABLET BY MOUTH DAILY      promethazine 25 MG tablet  Commonly known as: PHENERGAN   TAKE 1 TABLET BY MOUTH EVERY 6 HOURS AS NEEDED FOR NAUSEA AND VOMITING      sodium chloride 0.65 % nasal spray   1 spray, Nasal, As Needed      Stimulant Laxative 8.6-50 MG per tablet  Generic drug: sennosides-docusate   @@TAKE TWO TABLETS BY MOUTH TWO TIMES A DAY         Stop These Medications    atorvastatin 10 MG tablet  Commonly known as: LIPITOR     cefdinir 300 MG capsule  Commonly known as: OMNICEF     dilTIAZem  MG 24 hr capsule  Commonly known as: CARDIZEM CD     ferrous sulfate 325 (65 FE) MG tablet     finasteride 5 MG tablet  Commonly known as: PROSCAR     furosemide 40 MG tablet  Commonly known as: LASIX     ipratropium 0.06 % nasal spray  Commonly known as: Atrovent     potassium chloride 10 MEQ CR tablet     traZODone 50 MG tablet  Commonly known as:  DAYANARA Lares Ellipta 100-62.5-25 MCG/INH inhaler  Generic drug: Fluticasone-Umeclidin-Vilant          Discharge Diet:     Diet Instructions     Diet: Regular      Discharge Diet: Regular        Activity at Discharge:     Activity Instructions     Activity as Tolerated          Follow-up Appointments:    Follow-up Information     Miguel Rojas MD .    Specialty: Internal Medicine  Contact information:  [address]  [facility] [address]  [phone]                 Future Appointments   Date Time Provider Department Center   10/7/2021  3:30 PM Miguel Rojas MD [facility]   12/23/2021 10:00 AM Becka Clay APRN [facility]   1/3/2022 10:30 AM Ulysses Bass MD [facility]   2/7/2022 11:00 AM Slim Toribio MD [facility]     Test Results Pending at Discharge:    Pending Labs     Order Current Status    Chloride, Urine, Random - In process    Protein / Creatinine Ratio, Urine - In process    Blood Culture With KATE - Blood, Arm, Right Preliminary result           Ferdinand Greer MD  08/08/21  15:11 EDT    Time: I spent 35 minutes on this discharge activity which included: face-to-face encounter with the patient, reviewing the data in the system, coordination of the care with the nursing staff as well as consultants, documentation, and entering orders.     Dictated utilizing Dragon dictation.

## 2022-02-17 NOTE — PLAN OF CARE
Outpatient Physical Therapy Peds Progress Note       Patient Name: Kranthi Herrera  : 2021  MRN: [id number]  Today's Date: 2022       Visit Date: 2022     There is no problem list on file for this patient.    No past medical history on file.  No past surgical history on file.    Visit Dx:    ICD-10-CM ICD-9-CM   1. Left torticollis  M43.6 723.5   2. Plagiocephaly  Q67.3 754.0                                     OP Exercises     Row Name 22 1200             Subjective Comments    Subjective Comments Pt arrives with mother who states that he is doing better with stretching and that she initiated stretching of both sides because she felt his neck was tighter on right as well.  -AT              Total Minutes    47492 - PT Therapeutic Exercise Minutes 10  -AT      22730 -  PT Neuromuscular Reeducation Minutes 15  -AT      08816 - PT Therapeutic Activity Minutes 15  -AT              Exercise 1    Exercise Name 1 therex:  STM to left SCM, rotation left activiites, pull to sit to strengthen cervical spine, swiss ball play to improve cervical strength, prone activities looking left and  on elbows for head control  -AT              Exercise 2    Exercise Name 2 ther act:  Prone on elbows, assisted quadruped over prop a piller with extended elbows and looking left, tall kneeling play over prop a piller, sitting balance activities, weight bearing and bouncing, rolling assisted prone to supine and supine to prone  -AT              Exercise 3    Exercise Name 3 neuro:  TMR for left UT and right LT, swiss ball activities in sitting, supine an dprone to improve balance reactions.  -AT            User Key  (r) = Recorded By, (t) = Taken By, (c) = Cosigned By    Initials Name Provider Type    AT Toya Rene, PT Physical Therapist                              PT OP Goals     Row Name 22 1200          PT Short Term Goals    STG 1 Mother will be educated in HEP for positioning and  Problem: Patient Care Overview (Adult)  Goal: Plan of Care Review    03/23/17 1527   Coping/Psychosocial Response Interventions   Plan Of Care Reviewed With patient   Patient Care Overview   Progress improving   Outcome Evaluation   Outcome Summary/Follow up Plan Patient to achieve 1/3 outlined goals and partially achieve 2/3 outlined goals [facility]         Problem: Inpatient Physical Therapy  Goal: Transfer Training Goal 1 LTG- PT  Outcome: Unable to achieve outcome(s) by discharge Date Met:  03/23/17 03/21/17 1857 03/23/17 1527   Transfer Training PT LTG   Transfer Training PT LTG, Date Established 03/21/17 --    Transfer Training PT LTG, Time to Achieve 1 wk --    Transfer Training PT LTG, Activity Type bed to chair /chair to bed;sit to stand/stand to sit --    Transfer Training PT LTG, [place] Level conditional independence --    Transfer Training PT LTG, Assist Device walker, rolling --    Transfer Training PT LTG, Date Goal Reviewed 03/21/17 --    Transfer Training PT LTG, Outcome --  goal partially met   Transfer Training PT LTG, Reason Goal Not Met --  discharged from facility       Goal: Gait Training Goal LTG- PT  Outcome: Unable to achieve outcome(s) by discharge Date Met:  03/23/17 03/21/17 1857 03/23/17 1527   Gait Training PT LTG   Gait Training Goal PT LTG, Date Established 03/21/17 --    Gait Training Goal PT LTG, Time to Achieve 1 wk --    Gait Training Goal PT LTG, [place] Level conditional independence --    Gait Training Goal PT LTG, Assist Device walker, rolling --    Gait Training Goal PT LTG, Distance to Achieve 250 --    Gait Training Goal PT LTG, Date Goal Reviewed 03/21/17 --    Gait Training Goal PT LTG, Outcome --  goal partially met   Gait Training Goal PT LTG, Reason Goal Not Met --  discharged from facility            stretching  -AT     STG 1 Progress Met  -AT     STG 1 Progress Comments met 2/17/22  -AT     STG 2 Pt will be able to actively rotate head left 70 degrees.  -AT     STG 2 Progress Met  -AT     STG 2 Progress Comments met 2/17/22  -AT     STG 3 Pt will be able to maintain head midline in all planes for 30 sec each  -AT     STG 3 Progress Ongoing  -AT     STG 3 Progress Comments improving however cont tilt noted occassionally  -AT            Long Term Goals    LTG 1 Mother will be independent with HEP for stretching and positioning  -AT     LTG 1 Progress Ongoing  -AT     LTG 2 Pt will demo full active rotation left  -AT     LTG 2 Progress Ongoing  -AT     LTG 2 Progress Comments approx 75 degrees  -AT     LTG 3 Pt will be able to lay in supine in midline during sleep without staying on right posterior lateral cranium.  -AT     LTG 3 Progress Met  -AT     LTG 3 Progress Comments met 2/17/22  -AT     LTG 4 Pt will demo no lateral head tilt left  -AT     LTG 4 Progress Ongoing  -AT     LTG 4 Progress Comments improved however cont tilt noted  -AT     LTG 5 CVAI score will improve from level 4 to level 2.  -AT     LTG 5 Progress Met  -AT     LTG 5 Progress Comments goal met today and he presented with score of 4.6 which is a level 2  -AT           User Key  (r) = Recorded By, (t) = Taken By, (c) = Cosigned By    Initials Name Provider Type    AT Toya Rene PT Physical Therapist               PT Assessment/Plan     Row Name 02/17/22 1200          PT Assessment    Impairments Range of motion; Posture; Muscle strength; Locomotion; Impaired neuromotor development; Impaired postural alignment; Impaired muscle length  -AT     Assessment Comments Pt is a 5 month old child referred due to plagiocephaly.  He presents with plagiocephaly of right posterior lateral cranium.  CVAI score was has improved from a level 4 to a level 2.  Pt also presents overall with decreased cervical range of motion, and strength and will  benefit from skilled PT services to address limitaitons and reach max functional level. He met 2/4 STG and 2/5 LTGs this month  -AT     Rehab Potential Good  -AT     Patient/caregiver participated in establishment of treatment plan and goals Yes  -AT     Patient would benefit from skilled therapy intervention Yes  -AT            PT Plan    PT Frequency 1x/week  -AT     Predicted Duration of Therapy Intervention (PT) 12 weeks  -AT     Planned CPT's? PT EVAL MOD COMPLELITY: 83465; PT RE-EVAL: 83697; PT THER PROC EA 15 MIN: 17261; PT THER ACT EA 15 MIN: 04135; PT GAIT TRAINING EA 15 MIN: 21024; PT NEUROMUSC RE-EDUCATION EA 15 MIN: 72236; PT MANUAL THERAPY EA 15 MIN: 01615  -AT     Physical Therapy Interventions (Optional Details) balance training; fine motor skills; gross motor skills; home exercise program; manual therapy techniques; motor coordination training; neuromuscular re-education; patient/family education; postural re-education; ROM (Range of Motion); stretching; strengthening; swiss ball techniques; taping  -AT     PT Plan Comments Pt will benefit form skilled PT serivces to address limitations and reach max funcitonal level and improve cervical mobility  -AT           User Key  (r) = Recorded By, (t) = Taken By, (c) = Cosigned By    Initials Name Provider Type    AT Toya Rene PT Physical Therapist                       Time Calculation:   Timed Charges  33218 - PT Therapeutic Exercise Minutes: 10  68934 -  PT Neuromuscular Reeducation Minutes: 15  49651 - PT Therapeutic Activity Minutes: 15  Total Minutes  Timed Charges Total Minutes: 40   Total Minutes: 40       Diana Ward MD  NPI: [id number]      Toya Rene PT   License number:  [id number]    Electronically signed by:             Toya Rene, PT  2/17/2022

## 2022-06-22 NOTE — TELEPHONE ENCOUNTER
Aklief Pregnancy And Lactation Text: It is unknown if this medication is safe to use during pregnancy.  It is unknown if this medication is excreted in breast milk.  Breastfeeding women should use the topical cream on the smallest area of the skin for the shortest time needed while breastfeeding.  Do not apply to nipple and areola. PATIENT CALLED ABOUT MOBILE WHEELCHAIR EQUIPMENT ORDER.    PLEASE ADVISE.   Tazorac Counseling:  Patient advised that medication is irritating and drying.  Patient may need to apply sparingly and wash off after an hour before eventually leaving it on overnight.  The patient verbalized understanding of the proper use and possible adverse effects of tazorac.  All of the patient's questions and concerns were addressed. Erythromycin Pregnancy And Lactation Text: This medication is Pregnancy Category B and is considered safe during pregnancy. It is also excreted in breast milk. Spironolactone Pregnancy And Lactation Text: This medication can cause feminization of the male fetus and should be avoided during pregnancy. The active metabolite is also found in breast milk. Topical Retinoid Pregnancy And Lactation Text: This medication is Pregnancy Category C. It is unknown if this medication is excreted in breast milk. Benzoyl Peroxide Pregnancy And Lactation Text: This medication is Pregnancy Category C. It is unknown if benzoyl peroxide is excreted in breast milk. Include Pregnancy/Lactation Warning?: No Topical Sulfur Applications Pregnancy And Lactation Text: This medication is Pregnancy Category C and has an unknown safety profile during pregnancy. It is unknown if this topical medication is excreted in breast milk. Winlevi Pregnancy And Lactation Text: This medication is considered safe during pregnancy and breastfeeding. Sarecycline Counseling: Patient advised regarding possible photosensitivity and discoloration of the teeth, skin, lips, tongue and gums.  Patient instructed to avoid sunlight, if possible.  When exposed to sunlight, patients should wear protective clothing, sunglasses, and sunscreen.  The patient was instructed to call the office immediately if the following severe adverse effects occur:  hearing changes, easy bruising/bleeding, severe headache, or vision changes.  The patient verbalized understanding of the proper use and possible adverse effects of sarecycline.  All of the patient's questions and concerns were addressed. Benzoyl Peroxide Counseling: Patient counseled that medicine may cause skin irritation and bleach clothing.  In the event of skin irritation, the patient was advised to reduce the amount of the drug applied or use it less frequently.   The patient verbalized understanding of the proper use and possible adverse effects of benzoyl peroxide.  All of the patient's questions and concerns were addressed. Spironolactone Counseling: Patient advised regarding risks of diarrhea, abdominal pain, hyperkalemia, birth defects (for female patients), liver toxicity and renal toxicity. The patient may need blood work to monitor liver and kidney function and potassium levels while on therapy. The patient verbalized understanding of the proper use and possible adverse effects of spironolactone.  All of the patient's questions and concerns were addressed. Minocycline Counseling: Patient advised regarding possible photosensitivity and discoloration of the teeth, skin, lips, tongue and gums.  Patient instructed to avoid sunlight, if possible.  When exposed to sunlight, patients should wear protective clothing, sunglasses, and sunscreen.  The patient was instructed to call the office immediately if the following severe adverse effects occur:  hearing changes, easy bruising/bleeding, severe headache, or vision changes.  The patient verbalized understanding of the proper use and possible adverse effects of minocycline.  All of the patient's questions and concerns were addressed. Bactrim Pregnancy And Lactation Text: This medication is Pregnancy Category D and is known to cause fetal risk.  It is also excreted in breast milk. Isotretinoin Counseling: Patient should get monthly blood tests, not donate blood, not drive at night if vision affected, not share medication, and not undergo elective surgery for 6 months after tx completed. Side effects reviewed, pt to contact office should one occur. Tetracycline Pregnancy And Lactation Text: This medication is Pregnancy Category D and not consider safe during pregnancy. It is also excreted in breast milk. Topical Clindamycin Counseling: Patient counseled that this medication may cause skin irritation or allergic reactions.  In the event of skin irritation, the patient was advised to reduce the amount of the drug applied or use it less frequently.   The patient verbalized understanding of the proper use and possible adverse effects of clindamycin.  All of the patient's questions and concerns were addressed. Dapsone Pregnancy And Lactation Text: This medication is Pregnancy Category C and is not considered safe during pregnancy or breast feeding. High Dose Vitamin A Counseling: Side effects reviewed, pt to contact office should one occur. Erythromycin Counseling:  I discussed with the patient the risks of erythromycin including but not limited to GI upset, allergic reaction, drug rash, diarrhea, increase in liver enzymes, and yeast infections. High Dose Vitamin A Pregnancy And Lactation Text: High dose vitamin A therapy is contraindicated during pregnancy and breast feeding. Aklief counseling:  Patient advised to apply a pea-sized amount only at bedtime and wait 30 minutes after washing their face before applying.  If too drying, patient may add a non-comedogenic moisturizer.  The most commonly reported side effects including irritation, redness, scaling, dryness, stinging, burning, itching, and increased risk of sunburn.  The patient verbalized understanding of the proper use and possible adverse effects of retinoids.  All of the patient's questions and concerns were addressed. Detail Level: Detailed Azithromycin Counseling:  I discussed with the patient the risks of azithromycin including but not limited to GI upset, allergic reaction, drug rash, diarrhea, and yeast infections. Topical Clindamycin Pregnancy And Lactation Text: This medication is Pregnancy Category B and is considered safe during pregnancy. It is unknown if it is excreted in breast milk. Winlevi Counseling:  I discussed with the patient the risks of topical clascoterone including but not limited to erythema, scaling, itching, and stinging. Patient voiced their understanding. Bactrim Counseling:  I discussed with the patient the risks of sulfa antibiotics including but not limited to GI upset, allergic reaction, drug rash, diarrhea, dizziness, photosensitivity, and yeast infections.  Rarely, more serious reactions can occur including but not limited to aplastic anemia, agranulocytosis, methemoglobinemia, blood dyscrasias, liver or kidney failure, lung infiltrates or desquamative/blistering drug rashes. Tazorac Pregnancy And Lactation Text: This medication is not safe during pregnancy. It is unknown if this medication is excreted in breast milk. Tetracycline Counseling: Patient counseled regarding possible photosensitivity and increased risk for sunburn.  Patient instructed to avoid sunlight, if possible.  When exposed to sunlight, patients should wear protective clothing, sunglasses, and sunscreen.  The patient was instructed to call the office immediately if the following severe adverse effects occur:  hearing changes, easy bruising/bleeding, severe headache, or vision changes.  The patient verbalized understanding of the proper use and possible adverse effects of tetracycline.  All of the patient's questions and concerns were addressed. Patient understands to avoid pregnancy while on therapy due to potential birth defects. Azelaic Acid Counseling: Patient counseled that medicine may cause skin irritation and to avoid applying near the eyes.  In the event of skin irritation, the patient was advised to reduce the amount of the drug applied or use it less frequently.   The patient verbalized understanding of the proper use and possible adverse effects of azelaic acid.  All of the patient's questions and concerns were addressed. Azelaic Acid Pregnancy And Lactation Text: This medication is considered safe during pregnancy and breast feeding. Birth Control Pills Counseling: Birth Control Pill Counseling: I discussed with the patient the potential side effects of OCPs including but not limited to increased risk of stroke, heart attack, thrombophlebitis, deep venous thrombosis, hepatic adenomas, breast changes, GI upset, headaches, and depression.  The patient verbalized understanding of the proper use and possible adverse effects of OCPs. All of the patient's questions and concerns were addressed. Azithromycin Pregnancy And Lactation Text: This medication is considered safe during pregnancy and is also secreted in breast milk. Birth Control Pills Pregnancy And Lactation Text: This medication should be avoided if pregnant and for the first 30 days post-partum. Doxycycline Counseling:  Patient counseled regarding possible photosensitivity and increased risk for sunburn.  Patient instructed to avoid sunlight, if possible.  When exposed to sunlight, patients should wear protective clothing, sunglasses, and sunscreen.  The patient was instructed to call the office immediately if the following severe adverse effects occur:  hearing changes, easy bruising/bleeding, severe headache, or vision changes.  The patient verbalized understanding of the proper use and possible adverse effects of doxycycline.  All of the patient's questions and concerns were addressed. Isotretinoin Pregnancy And Lactation Text: This medication is Pregnancy Category X and is considered extremely dangerous during pregnancy. It is unknown if it is excreted in breast milk. Topical Retinoid counseling:  Patient advised to apply a pea-sized amount only at bedtime and wait 30 minutes after washing their face before applying.  If too drying, patient may add a non-comedogenic moisturizer. The patient verbalized understanding of the proper use and possible adverse effects of retinoids.  All of the patient's questions and concerns were addressed. Dapsone Counseling: I discussed with the patient the risks of dapsone including but not limited to hemolytic anemia, agranulocytosis, rashes, methemoglobinemia, kidney failure, peripheral neuropathy, headaches, GI upset, and liver toxicity.  Patients who start dapsone require monitoring including baseline LFTs and weekly CBCs for the first month, then every month thereafter.  The patient verbalized understanding of the proper use and possible adverse effects of dapsone.  All of the patient's questions and concerns were addressed. Doxycycline Pregnancy And Lactation Text: This medication is Pregnancy Category D and not consider safe during pregnancy. It is also excreted in breast milk but is considered safe for shorter treatment courses. Topical Sulfur Applications Counseling: Topical Sulfur Counseling: Patient counseled that this medication may cause skin irritation or allergic reactions.  In the event of skin irritation, the patient was advised to reduce the amount of the drug applied or use it less frequently.   The patient verbalized understanding of the proper use and possible adverse effects of topical sulfur application.  All of the patient's questions and concerns were addressed.

## 2022-10-30 NOTE — TELEPHONE ENCOUNTER
FLORECITA, called Shay[facility], actually patient's blood pressure was 92/68.  Recommend discontinue amlodipine.  Continue check blood pressure and let us know.   4 = No assist / stand by assistance

## 2023-12-06 NOTE — TELEPHONE ENCOUNTER
"Demonstrated spacer use with demo spacer and inhaler, instructed patient/parent to shake inhaler, prime inhaler (on first use) and attach to spacer. Then after creating good seal around mask, instructed parent/patient to \"puff inhaler\" and take 5 slow breaths in, with mask still in place. Instructed patient/parent to repeat for additional puffs.    Advised patient/parent to rinse mouth after using inhaler.    Kerline Flores RN   [facility] Pediatric Pulmonary Care Coordinator   phone: [phone]    " 2/1/21 telemedicine

## 2024-05-14 NOTE — PLAN OF CARE
Problem: Patient Care Overview (Adult)  Goal: Plan of Care Review  Outcome: Ongoing (interventions implemented as appropriate)   01/03/18 0340   Coping/Psychosocial Response Interventions   Plan Of Care Reviewed With patient   Patient Care Overview   Progress progress toward functional goals is gradual   Outcome Evaluation   Outcome Summary/Follow up Plan pt continues to receive iv antibiotics and steroids as ordered. Pt rested well this shift and wore bipap.     Goal: Adult Individualization and Mutuality  Outcome: Ongoing (interventions implemented as appropriate)    Goal: Discharge Needs Assessment  Outcome: Ongoing (interventions implemented as appropriate)         No
